# Patient Record
Sex: MALE | Race: WHITE | URBAN - METROPOLITAN AREA
[De-identification: names, ages, dates, MRNs, and addresses within clinical notes are randomized per-mention and may not be internally consistent; named-entity substitution may affect disease eponyms.]

---

## 2017-01-09 ENCOUNTER — OFFICE VISIT (OUTPATIENT)
Dept: CARDIOLOGY | Facility: CLINIC | Age: 62
End: 2017-01-09
Attending: NURSE PRACTITIONER
Payer: COMMERCIAL

## 2017-01-09 VITALS
HEART RATE: 70 BPM | DIASTOLIC BLOOD PRESSURE: 76 MMHG | BODY MASS INDEX: 29.25 KG/M2 | SYSTOLIC BLOOD PRESSURE: 100 MMHG | HEIGHT: 68 IN | WEIGHT: 193 LBS

## 2017-01-09 DIAGNOSIS — R06.02 SOB (SHORTNESS OF BREATH): ICD-10-CM

## 2017-01-09 DIAGNOSIS — Z95.2 S/P AVR: ICD-10-CM

## 2017-01-09 DIAGNOSIS — I10 ESSENTIAL HYPERTENSION: Primary | ICD-10-CM

## 2017-01-09 PROCEDURE — 99214 OFFICE O/P EST MOD 30 MIN: CPT | Performed by: NURSE PRACTITIONER

## 2017-01-09 RX ORDER — AMLODIPINE BESYLATE 5 MG/1
5 TABLET ORAL 2 TIMES DAILY
Qty: 90 TABLET | Refills: 3
Start: 2017-01-09 | End: 2017-02-06

## 2017-01-09 NOTE — Clinical Note
1/9/2017    Dipak Gordillo MD  [facility]   [address] [facility] [address]    RE: Cricket Miguel       Dear Colleague,    I had the pleasure of seeing Cricket Miguel in the [facility].    HPI and Plan:   See dictation    No orders of the defined types were placed in this encounter.       Orders Placed This Encounter   Medications     amLODIPine (NORVASC) 5 MG tablet     Sig: Take 1 tablet (5 mg) by mouth 2 times daily     Dispense:  90 tablet     Refill:  3       Medications Discontinued During This Encounter   Medication Reason     amLODIPine (NORVASC) 5 MG tablet Reorder         Encounter Diagnoses   Name Primary?     Hypertension goal BP (blood pressure) < 140/90      SOB (shortness of breath)      S/P AVR Yes     Essential hypertension        CURRENT MEDICATIONS:  Current Outpatient Prescriptions   Medication Sig Dispense Refill     amLODIPine (NORVASC) 5 MG tablet Take 1 tablet (5 mg) by mouth 2 times daily 90 tablet 3     carvedilol (COREG) 12.5 MG tablet Take 1 tablet (12.5 mg) by mouth 2 times daily (with meals) 180 tablet 3     hydrochlorothiazide (HYDRODIURIL) 25 MG tablet Take 1 tablet (25 mg) by mouth daily 90 tablet 3     aspirin  MG tablet Take 1 tablet (325 mg) by mouth daily 60 tablet 3     atorvastatin (LIPITOR) 40 MG tablet Take 1 tablet (40 mg) by mouth daily 90 tablet 12     [DISCONTINUED] amLODIPine (NORVASC) 5 MG tablet Take 2 tablets (10 mg) by mouth daily 90 tablet 3       ALLERGIES     Allergies   Allergen Reactions     Lisinopril Swelling     One-sided facial swelling after being on lisinopril/HCTZ for one week.        PAST MEDICAL HISTORY:  Past Medical History   Diagnosis Date     Hypertension goal BP (blood pressure) < 140/90      Major depressive disorder, single episode, moderate (H)      Hyperlipidemia LDL goal <70 10/31/2010     left lung nodule  1/29/2008     Tobacco use disorder      Psoriasis childhood     Inguinal  hernia      COPD, mild (H)      spirometry 12/16     CHF (congestive heart failure), NYHA class II (H)      AR (aortic regurgitation)      severe     AI (aortic insufficiency)      Aortic root dilatation (H)      Heart murmur      Cardiomyopathy (H)        PAST SURGICAL HISTORY:  Past Surgical History   Procedure Laterality Date     C shoulder arthroscopy, dx  2001     Arthroscopy, Shoulder RT Dr OSIRIS Andersen     C shoulder arthroscopy, dx  1/04     LT arthroscopy Dr OSIRIS Andersen     Hernia repair, umbilical  1/08     incarcerated - dr rockwell     Rotator cuff repair rt/lt  11/10     Rt arthroscopy - Dr OSIRIS Andersen     Herniorrhaphy inguinal  12/21/2012     HERNIORRHAPHY INGUINAL;  Right Inguinal Hernia Repair with mesh ;  Surgeon: Mello Rockwell MD;  Location: [facility] OR     Surgical history of -   5/16     AVR, severe AR, aortic root repair     Replace valve aortic N/A 5/17/2016     REPLACE VALVE AORTIC - Dr Bowers       FAMILY HISTORY:  Family History   Problem Relation Age of Onset     Genetic Disorder Mother      Bone plateover growth Agustin. shoulder surgeries     CANCER Mother      brain cancer     Alzheimer Disease Father        SOCIAL HISTORY:  Social History     Social History     Marital Status:      Spouse Name: N/A     Number of Children: 3     Years of Education: 12     Social History Main Topics     Smoking status: Former Smoker -- 1.00 packs/day for 35 years     Quit date: 04/01/2016     Smokeless tobacco: Never Used      Comment: 4/2016     Alcohol Use: 0.0 oz/week     0 Standard drinks or equivalent per week      Comment: 1 drink per week avg, seldom     Drug Use: Yes      Comment: marijuana- daily     Sexual Activity:     Partners: Female     Other Topics Concern     Caffeine Concern Yes     3 cups     Sleep Concern No     Special Diet No     trying to watch salt     Exercise Yes     walking     Seat Belt No     Parent/Sibling W/ Cabg, Mi Or Angioplasty Before 65f 55m? No     Social History Narrative  "      Review of Systems:  Skin:  Negative       Eyes:  Positive for glasses    ENT:  Positive for nasal congestion    Respiratory:  Negative       Cardiovascular:    Positive for;lightheadedness;dizziness feet  Gastroenterology: Negative      Genitourinary:  Negative      Musculoskeletal:  Negative      Neurologic:  Negative      Psychiatric:  Negative      Heme/Lymph/Imm:  Positive for allergies    Endocrine:  Negative        Physical Exam:  Vitals: /76 mmHg  Pulse 70  Ht 1.727 m (5' 8\")  Wt 87.544 kg (193 lb)  BMI 29.35 kg/m2    Constitutional:  cooperative, alert and oriented, well developed, well nourished, in no acute distress        Skin:  warm and dry to the touch        Head:  normocephalic        Eyes:  sclera white        ENT:  no pallor or cyanosis        Neck:  carotid pulses are full and equal bilaterally        Chest:  normal breath sounds, clear to auscultation, normal A-P diameter, normal symmetry, normal respiratory excursion, no use of accessory muscles  (bilateral basilar LS diminished)        Cardiac: regular rhythm;no murmurs, gallops or rubs detected                  Abdomen:  abdomen soft        Vascular: pulses full and equal                                        Extremities and Back:  no deformities, clubbing, cyanosis, erythema observed;no edema              Neurological:  affect appropriate, oriented to time, person and place;no gross motor deficits              CC  Jane Alcaraz, BIBI CNP   [facility]  [address]  [place] [address]                  Cardiology Clinic Progress Note  Cricket Miguel MRN# [id number]   YOB: 1955 Age: 61 year old     Reason For Visit:  BP recheck and SOB   Primary Cardiologist:   Dr. Garzon          History of Presenting Illness:    Cricket Miguel is a pleasant 61 year old patient with a past cardiac history significant for bioprosthetic AVR with Dr. Bowers 5/2016, CAD with 90-95% stenosis OM1 too " small to graft at the time of valve replacement, and hypertension.  Past medical history significant for history of tobacco abuse (35-40 year pack history) with cessation in June 2016 and current history of smoking marijuana.  He has a history of face swelling which may have been angioedema on lisinopril and possibly losartan.  Pt was last seen by me on 12/20/2016 for hypertension and SOB.  At that time he reported home blood pressure readings of 150/105 and shortness of breath occasionally occurring with exertion.  Amlodipine 5 mg daily was increased to 5mg b.i.d.    Pt presents today for BP recheck and SOB.  His blood pressure today in the office is 100/76.  He has not been taking his home blood pressures.  He denies any significant lightheadedness or dizziness since increasing his amlodipine.  He has been taking the increased amlodipine 5 mg b.i.d.  He does continue to have complaints of occasional shortness of breath occurring about twice a month.  This sometimes occurs with exertion while carrying a package and also occurs at rest.  He states that he is surprised his lung function has not improved after he quit smoking back in April.  I did discuss that with his 35-40 year pack history, he most likely has some permanent lung damage. Patient reports no chest pain, PND, orthopnea, presyncope, syncope, edema, heart racing, or palpitations.      Current Cardiac Medications   Carvedilol 12.5 mg b.i.d.  Hydrochlorothiazide 25 mg daily  Amlodipine 5 mg b.i.d.  Aspirin 325 mg daily  Atorvastatin 40 mg daily                    Assessment and Plan:     Plan  1.  Continue current medications   2.  Call the clinic if blood pressure is consistently lower than 100/50 associated with lightheadedness or dizziness.  Bring blood pressure recordings with to next office visit  3.  Call the clinic if increase in SOB or any chest discomfort. Could consider adding Imdur for OM1 stenosis, if BP is not too low.   4.   Follow-up with  Dr. Garzon 2/6 as scheduled         1. Hypertension    Controlled 100/76. Has not been checking home BPs    Continue amlodipine, carvedilol, and hydrochlorothiazide      2. CAD    Angiogram 5/2016 with 90-95% stenosis OM1, 40-50% mid LAD, and 30% proximal circumflex    OM1 was too small to graft during AVR    Possible angina with SOB, see below    Continue statin, aspirin, beta blocker, amlodipine      3. SOB    Occasional episodes about twice a month, occurs with exertion and at rest. This has been stable.     Could consider angina d/t small OM1 with 90-95% stenosis or progressing lung disease given 35-40 year pack history    Pt does not currently want to add Imdur as he is trying to decrease the amount of medication he takes      4. History of bioprosthetic aortic valve replacement    5/2016       Thank you for allowing me to participate in this delightful patient's care.      This note was completed in part using Dragon voice recognition software. Although reviewed after completion, some word and grammatical errors may occur.    BIBI Keys, CNP           Data:   All laboratory data reviewed:    LAST CHOLESTEROL:  CHOL      105   4/3/2016  HDL       25   4/3/2016  LDL       56   4/3/2016  TRIG      120   4/3/2016  CHOLHDLRATIO      4.2   2/7/2012    LAST BMP:  NA      141   6/3/2016   POTASSIUM      4.9   6/3/2016  CHLORIDE      106   6/3/2016  IZABELLA      9.7   6/3/2016  CO2       30   6/3/2016  BUN       20   6/3/2016  CR     1.03   6/3/2016  GLC       87   6/3/2016    LAST CBC:  WBC     11.2   6/3/2016  RBC     3.79   6/3/2016  HGB     11.0   6/3/2016  HCT     37.1   6/3/2016  MCV       98   6/3/2016  MCH     29.0   6/3/2016  MCHC     29.6   6/3/2016  RDW     16.1   6/3/2016  PLT      353   6/3/2016    Thank you for allowing me to participate in the care of your patient.    Sincerely,     BIBI Keys CNP     [facility]

## 2017-01-09 NOTE — MR AVS SNAPSHOT
After Visit Summary   1/9/2017    Cricket Miguel    MRN: [id number]           Patient Information     Date Of Birth          1955        Visit Information        Provider Department      1/9/2017 12:30 PM Jane Alcaraz APRN CNP [facility] [place]        Today's Diagnoses     S/P AVR    -  1     Hypertension goal BP (blood pressure) < 140/90         SOB (shortness of breath)         Essential hypertension           Care Instructions    Thanks for coming into [facility] today.    We discussed:   Check home Blood pressure and bring readings to next visit with Dr. Garzon  Call the clinic if BP< 100 or lightheadedness or diziness gets worse    Medication changes:    No changes today     Follow up:   Dr. Garzon 2/6    Please call my nurse Amada at [phone] with any questions or concerns.    Scheduling phone number: [phone]  Reminder: Please bring in all current medications, over the counter supplements and vitamin bottles to your next appointment.                Follow-ups after your visit        Your next 10 appointments already scheduled     Feb 06, 2017  4:15 PM   Return Visit with Alfredito Garzon MD   [facility] ([facility])    [address] [address] [address] [phone]              Who to contact     If you have questions or need follow up information about today's clinic visit or your schedule please contact [facility] directly at [phone].  Normal or non-critical lab and imaging results will be communicated to you by MyChart, letter or phone within 4 business days after the clinic has received the results. If you do not hear from us within 7 days, please contact the clinic through MyChart or phone. If you have a critical or abnormal lab result, we will notify you by phone as soon as  "possible.  Submit refill requests through [employer] or call your pharmacy and they will forward the refill request to us. Please allow 3 business days for your refill to be completed.          Additional Information About Your Visit        [employer] Information     [employer] gives you secure access to your electronic health record. If you see a primary care provider, you can also send messages to your care team and make appointments. If you have questions, please call your primary care clinic.  If you do not have a primary care provider, please call [phone] and they will assist you.        Care EveryWhere ID     This is your Care EveryWhere ID. This could be used by other organizations to access your [facility] medical records  [id number]        Your Vitals Were     Pulse Height BMI (Body Mass Index)             70 1.727 m (5' 8\") 29.35 kg/m2          Blood Pressure from Last 3 Encounters:   01/09/17 100/76   12/20/16 124/80   11/30/16 118/74    Weight from Last 3 Encounters:   01/09/17 87.544 kg (193 lb)   12/20/16 87.091 kg (192 lb)   11/30/16 89.359 kg (197 lb)              We Performed the Following     Follow-Up with Cardiac Advanced Practice Provider          Today's Medication Changes          These changes are accurate as of: 1/9/17  1:23 PM.  If you have any questions, ask your nurse or doctor.               These medicines have changed or have updated prescriptions.        Dose/Directions    amLODIPine 5 MG tablet   Commonly known as:  NORVASC   This may have changed:    - how much to take  - when to take this   Used for:  S/P AVR, Essential hypertension   Changed by:  Jane Alcaraz APRN CNP        Dose:  5 mg   Take 1 tablet (5 mg) by mouth 2 times daily   Quantity:  90 tablet   Refills:  3            Where to get your medicines      Some of these will need a paper prescription and others can be bought over the counter.  Ask your nurse if you have questions.     You don't need a prescription " for these medications    - amLODIPine 5 MG tablet             Primary Care Provider Office Phone # Fax #    Dipak Gordillo [phone]       [address] [address]        Thank you!     Thank you for choosing [facility] [place]  for your care. Our goal is always to provide you with excellent care. Hearing back from our patients is one way we can continue to improve our services. Please take a few minutes to complete the written survey that you may receive in the mail after your visit with us. Thank you!             Your Updated Medication List - Protect others around you: Learn how to safely use, store and throw away your medicines at www.disposemymeds.org.          This list is accurate as of: 1/9/17  1:23 PM.  Always use your most recent med list.                   Brand Name Dispense Instructions for use    amLODIPine 5 MG tablet    NORVASC    90 tablet    Take 1 tablet (5 mg) by mouth 2 times daily       aspirin  MG EC tablet     60 tablet    Take 1 tablet (325 mg) by mouth daily       atorvastatin 40 MG tablet    LIPITOR    90 tablet    Take 1 tablet (40 mg) by mouth daily       carvedilol 12.5 MG tablet    COREG    180 tablet    Take 1 tablet (12.5 mg) by mouth 2 times daily (with meals)       hydrochlorothiazide 25 MG tablet    HYDRODIURIL    90 tablet    Take 1 tablet (25 mg) by mouth daily

## 2017-01-09 NOTE — PROGRESS NOTES
Cardiology Clinic Progress Note  Cricket Miguel MRN# [id number]   YOB: 1955 Age: 61 year old     Reason For Visit:  BP recheck and SOB   Primary Cardiologist:   Dr. Garzon          History of Presenting Illness:    Cricket Miguel is a pleasant 61 year old patient with a past cardiac history significant for bioprosthetic AVR with Dr. Bowers 5/2016, CAD with 90-95% stenosis OM1 too small to graft at the time of valve replacement, and hypertension.  Past medical history significant for history of tobacco abuse (35-40 year pack history) with cessation in June 2016 and current history of smoking marijuana.  He has a history of face swelling which may have been angioedema on lisinopril and possibly losartan.  Pt was last seen by me on 12/20/2016 for hypertension and SOB.  At that time he reported home blood pressure readings of 150/105 and shortness of breath occasionally occurring with exertion.  Amlodipine 5 mg daily was increased to 5mg b.i.d.    Pt presents today for BP recheck and SOB.  His blood pressure today in the office is 100/76.  He has not been taking his home blood pressures.  He denies any significant lightheadedness or dizziness since increasing his amlodipine.  He has been taking the increased amlodipine 5 mg b.i.d.  He does continue to have complaints of occasional shortness of breath occurring about twice a month.  This sometimes occurs with exertion while carrying a package and also occurs at rest.  He states that he is surprised his lung function has not improved after he quit smoking back in April.  I did discuss that with his 35-40 year pack history, he most likely has some permanent lung damage. Patient reports no chest pain, PND, orthopnea, presyncope, syncope, edema, heart racing, or palpitations.      Current Cardiac Medications   Carvedilol 12.5 mg b.i.d.  Hydrochlorothiazide 25 mg daily  Amlodipine 5 mg b.i.d.  Aspirin 325 mg daily  Atorvastatin 40 mg daily                     Assessment and Plan:     Plan  1.  Continue current medications   2.  Call the clinic if blood pressure is consistently lower than 100/50 associated with lightheadedness or dizziness.  Bring blood pressure recordings with to next office visit  3.  Call the clinic if increase in SOB or any chest discomfort. Could consider adding Imdur for OM1 stenosis, if BP is not too low.   4.   Follow-up with Dr. Garzon 2/6 as scheduled         1. Hypertension    Controlled 100/76. Has not been checking home BPs    Continue amlodipine, carvedilol, and hydrochlorothiazide      2. CAD    Angiogram 5/2016 with 90-95% stenosis OM1, 40-50% mid LAD, and 30% proximal circumflex    OM1 was too small to graft during AVR    Possible angina with SOB, see below    Continue statin, aspirin, beta blocker, amlodipine      3. SOB    Occasional episodes about twice a month, occurs with exertion and at rest. This has been stable.     Could consider angina d/t small OM1 with 90-95% stenosis or progressing lung disease given 35-40 year pack history    Pt does not currently want to add Imdur as he is trying to decrease the amount of medication he takes      4. History of bioprosthetic aortic valve replacement    5/2016       Thank you for allowing me to participate in this delightful patient's care.      This note was completed in part using Dragon voice recognition software. Although reviewed after completion, some word and grammatical errors may occur.    Jane Alcaraz, APRN, CNP           Data:   All laboratory data reviewed:    LAST CHOLESTEROL:  CHOL      105   4/3/2016  HDL       25   4/3/2016  LDL       56   4/3/2016  TRIG      120   4/3/2016  CHOLHDLRATIO      4.2   2/7/2012    LAST BMP:  NA      141   6/3/2016   POTASSIUM      4.9   6/3/2016  CHLORIDE      106   6/3/2016  IZABELLA      9.7   6/3/2016  CO2       30   6/3/2016  BUN       20   6/3/2016  CR     1.03   6/3/2016  GLC       87   6/3/2016    LAST CBC:  WBC     11.2   6/3/2016  RBC      3.79   6/3/2016  HGB     11.0   6/3/2016  HCT     37.1   6/3/2016  MCV       98   6/3/2016  MCH     29.0   6/3/2016  MCHC     29.6   6/3/2016  RDW     16.1   6/3/2016  PLT      353   6/3/2016

## 2017-01-09 NOTE — PROGRESS NOTES
HPI and Plan:   See dictation    No orders of the defined types were placed in this encounter.       Orders Placed This Encounter   Medications     amLODIPine (NORVASC) 5 MG tablet     Sig: Take 1 tablet (5 mg) by mouth 2 times daily     Dispense:  90 tablet     Refill:  3       Medications Discontinued During This Encounter   Medication Reason     amLODIPine (NORVASC) 5 MG tablet Reorder         Encounter Diagnoses   Name Primary?     Hypertension goal BP (blood pressure) < 140/90      SOB (shortness of breath)      S/P AVR Yes     Essential hypertension        CURRENT MEDICATIONS:  Current Outpatient Prescriptions   Medication Sig Dispense Refill     amLODIPine (NORVASC) 5 MG tablet Take 1 tablet (5 mg) by mouth 2 times daily 90 tablet 3     carvedilol (COREG) 12.5 MG tablet Take 1 tablet (12.5 mg) by mouth 2 times daily (with meals) 180 tablet 3     hydrochlorothiazide (HYDRODIURIL) 25 MG tablet Take 1 tablet (25 mg) by mouth daily 90 tablet 3     aspirin  MG tablet Take 1 tablet (325 mg) by mouth daily 60 tablet 3     atorvastatin (LIPITOR) 40 MG tablet Take 1 tablet (40 mg) by mouth daily 90 tablet 12     [DISCONTINUED] amLODIPine (NORVASC) 5 MG tablet Take 2 tablets (10 mg) by mouth daily 90 tablet 3       ALLERGIES     Allergies   Allergen Reactions     Lisinopril Swelling     One-sided facial swelling after being on lisinopril/HCTZ for one week.        PAST MEDICAL HISTORY:  Past Medical History   Diagnosis Date     Hypertension goal BP (blood pressure) < 140/90      Major depressive disorder, single episode, moderate (H)      Hyperlipidemia LDL goal <70 10/31/2010     left lung nodule  1/29/2008     Tobacco use disorder      Psoriasis childhood     Inguinal hernia      COPD, mild (H)      spirometry 12/16     CHF (congestive heart failure), NYHA class II (H)      AR (aortic regurgitation)      severe     AI (aortic insufficiency)      Aortic root dilatation (H)      Heart murmur      Cardiomyopathy  (H)        PAST SURGICAL HISTORY:  Past Surgical History   Procedure Laterality Date     C shoulder arthroscopy, dx  2001     Arthroscopy, Shoulder RT Dr OSIRIS Andersen     C shoulder arthroscopy, dx  1/04     LT arthroscopy Dr OSIRIS Andersen     Hernia repair, umbilical  1/08     incarcerated - dr rockwell     Rotator cuff repair rt/lt  11/10     Rt arthroscopy - Dr OSIRIS Andersen     Herniorrhaphy inguinal  12/21/2012     HERNIORRHAPHY INGUINAL;  Right Inguinal Hernia Repair with mesh ;  Surgeon: Mello Rockwell MD;  Location: [facility] OR     Surgical history of -   5/16     AVR, severe AR, aortic root repair     Replace valve aortic N/A 5/17/2016     REPLACE VALVE AORTIC - Dr Bowers       FAMILY HISTORY:  Family History   Problem Relation Age of Onset     Genetic Disorder Mother      Bone plateover growth Agustin. shoulder surgeries     CANCER Mother      brain cancer     Alzheimer Disease Father        SOCIAL HISTORY:  Social History     Social History     Marital Status:      Spouse Name: N/A     Number of Children: 3     Years of Education: 12     Social History Main Topics     Smoking status: Former Smoker -- 1.00 packs/day for 35 years     Quit date: 04/01/2016     Smokeless tobacco: Never Used      Comment: 4/2016     Alcohol Use: 0.0 oz/week     0 Standard drinks or equivalent per week      Comment: 1 drink per week avg, seldom     Drug Use: Yes      Comment: marijuana- daily     Sexual Activity:     Partners: Female     Other Topics Concern     Caffeine Concern Yes     3 cups     Sleep Concern No     Special Diet No     trying to watch salt     Exercise Yes     walking     Seat Belt No     Parent/Sibling W/ Cabg, Mi Or Angioplasty Before 65f 55m? No     Social History Narrative       Review of Systems:  Skin:  Negative       Eyes:  Positive for glasses    ENT:  Positive for nasal congestion    Respiratory:  Negative       Cardiovascular:    Positive for;lightheadedness;dizziness feet  Gastroenterology: Negative     "  Genitourinary:  Negative      Musculoskeletal:  Negative      Neurologic:  Negative      Psychiatric:  Negative      Heme/Lymph/Imm:  Positive for allergies    Endocrine:  Negative        Physical Exam:  Vitals: /76 mmHg  Pulse 70  Ht 1.727 m (5' 8\")  Wt 87.544 kg (193 lb)  BMI 29.35 kg/m2    Constitutional:  cooperative, alert and oriented, well developed, well nourished, in no acute distress        Skin:  warm and dry to the touch        Head:  normocephalic        Eyes:  sclera white        ENT:  no pallor or cyanosis        Neck:  carotid pulses are full and equal bilaterally        Chest:  normal breath sounds, clear to auscultation, normal A-P diameter, normal symmetry, normal respiratory excursion, no use of accessory muscles  (bilateral basilar LS diminished)        Cardiac: regular rhythm;no murmurs, gallops or rubs detected                  Abdomen:  abdomen soft        Vascular: pulses full and equal                                        Extremities and Back:  no deformities, clubbing, cyanosis, erythema observed;no edema              Neurological:  affect appropriate, oriented to time, person and place;no gross motor deficits              BIBI Bolanos CNP   [facility]  [address]  [place] [id number]                "

## 2017-01-09 NOTE — PATIENT INSTRUCTIONS
Thanks for coming into [facility] clinic today.    We discussed:   Check home Blood pressure and bring readings to next visit with Dr. Garzon  Call the clinic if BP< 100 or lightheadedness or diziness gets worse    Medication changes:    No changes today     Follow up:   Dr. Garzon 2/6    Please call my nurse Amada at [phone] with any questions or concerns.    Scheduling phone number: [phone]  Reminder: Please bring in all current medications, over the counter supplements and vitamin bottles to your next appointment.

## 2017-02-03 ENCOUNTER — PRE VISIT (OUTPATIENT)
Dept: CARDIOLOGY | Facility: CLINIC | Age: 62
End: 2017-02-03

## 2017-02-06 ENCOUNTER — OFFICE VISIT (OUTPATIENT)
Dept: CARDIOLOGY | Facility: CLINIC | Age: 62
End: 2017-02-06
Attending: NURSE PRACTITIONER
Payer: COMMERCIAL

## 2017-02-06 VITALS
HEART RATE: 86 BPM | HEIGHT: 68 IN | DIASTOLIC BLOOD PRESSURE: 84 MMHG | SYSTOLIC BLOOD PRESSURE: 132 MMHG | WEIGHT: 202 LBS | BODY MASS INDEX: 30.62 KG/M2

## 2017-02-06 DIAGNOSIS — I10 ESSENTIAL HYPERTENSION: ICD-10-CM

## 2017-02-06 DIAGNOSIS — R06.02 SOB (SHORTNESS OF BREATH): ICD-10-CM

## 2017-02-06 DIAGNOSIS — Z95.2 S/P AVR: Primary | ICD-10-CM

## 2017-02-06 DIAGNOSIS — I10 HYPERTENSION GOAL BP (BLOOD PRESSURE) < 140/90: ICD-10-CM

## 2017-02-06 PROCEDURE — 99215 OFFICE O/P EST HI 40 MIN: CPT | Performed by: INTERNAL MEDICINE

## 2017-02-06 RX ORDER — FUROSEMIDE 20 MG
20 TABLET ORAL DAILY PRN
Qty: 90 TABLET | Refills: 3 | Status: ON HOLD | OUTPATIENT
Start: 2017-02-06 | End: 2017-11-03

## 2017-02-06 RX ORDER — AMLODIPINE BESYLATE 5 MG/1
5 TABLET ORAL 2 TIMES DAILY
Qty: 180 TABLET | Refills: 3 | Status: SHIPPED | OUTPATIENT
Start: 2017-02-06 | End: 2017-12-06

## 2017-02-06 NOTE — PROGRESS NOTES
"Cardiology Progress Note          Assessment and Plan:     1. Status post aortic valve replacement    Bioprosthetic aortic valve functioning normally.  Continue to follow.      2. Coronary artery disease with small OM, medically managed    Continue medical therapy.      3. Fatigue and dyspnea    Likely multifactorial.  Trial of decreasing his carvedilol down to 6.25 mg twice per day and adding furosemide when blood pressures are high.    May need to repeat stress testing in the future.    Labs including TSH, basic metabolic panel and CBC to be scheduled in the near future.  Follow up in clinic with me two months.    This note was transcribed using electronic voice recognition software and there may be typographical errors present.                Interval History:   The patient is a pleasant and talkative 61 year old whom I have been following for aortic regurgitation now status post aortic valve replacement.  He has gained approximately 20 pounds after he quit smoking.  He feels somewhat short of breath.  He has bilateral hand tingling that is intermittent and worse with the cold.  This has improved with change in reduction of his beta blocker.  He has COPD from his previous smoking.  He has stable dyspnea on exertion.                       Review of Systems:   Review of Systems:  Skin:  Positive for     Eyes:  Positive for glasses  ENT:  Negative    Respiratory:  Positive for shortness of breath;dyspnea on exertion;wheezing  Cardiovascular:    lightheadedness;dizziness;Positive for;chest pain;edema  Gastroenterology: Negative    Genitourinary:  Negative    Musculoskeletal:  Negative    Neurologic:  Positive for numbness or tingling of hands  Psychiatric:  Negative    Heme/Lymph/Imm:  Negative    Endocrine:  Negative                Physical Exam:     Vitals: /84 mmHg  Pulse 86  Ht 1.727 m (5' 8\")  Wt 91.627 kg (202 lb)  BMI 30.72 kg/m2  Constitutional:  cooperative, alert and oriented, well developed, well " nourished, in no acute distress        Skin:  warm and dry to the touch        Head:  normocephalic        Eyes:  sclera white        ENT:  no pallor or cyanosis        Neck:  JVP normal        Chest:     (bilateral basilar LS diminished) trace bibasilar crackles    Cardiac: regular rhythm       grade 1;RUSB          Abdomen:      benign    Extremities and Back:  no deformities, clubbing, cyanosis, erythema observed        Neurological:  affect appropriate, oriented to time, person and place;no gross motor deficits                 Medications:     Current Outpatient Prescriptions   Medication Sig Dispense Refill     amLODIPine (NORVASC) 5 MG tablet Take 1 tablet (5 mg) by mouth 2 times daily 180 tablet 3     furosemide (LASIX) 20 MG tablet Take 1 tablet (20 mg) by mouth daily as needed If blood pressure above 130 90 tablet 3     carvedilol (COREG) 12.5 MG tablet Take 1 tablet (12.5 mg) by mouth 2 times daily (with meals) 180 tablet 3     hydrochlorothiazide (HYDRODIURIL) 25 MG tablet Take 1 tablet (25 mg) by mouth daily 90 tablet 3     aspirin  MG tablet Take 1 tablet (325 mg) by mouth daily 60 tablet 3     atorvastatin (LIPITOR) 40 MG tablet Take 1 tablet (40 mg) by mouth daily 90 tablet 12     [DISCONTINUED] amLODIPine (NORVASC) 5 MG tablet Take 1 tablet (5 mg) by mouth 2 times daily 90 tablet 3                Data:   All laboratory data reviewed  No results found for this or any previous visit (from the past 24 hour(s)).    All laboratory data reviewed  CHOL      105   4/3/2016  HDL       25   4/3/2016  LDL       56   4/3/2016  TRIG      120   4/3/2016  CHOLHDLRATIO      4.2   2/7/2012  TSH   Date Value Ref Range Status   04/03/2016 0.945 mcU/mL Final     Last Basic Metabolic Panel:  NA      141   6/3/2016   POTASSIUM      4.9   6/3/2016  CHLORIDE      106   6/3/2016  IZABELLA      9.7   6/3/2016  CO2       30   6/3/2016  BUN       20   6/3/2016  CR     1.03   6/3/2016  GLC       87   6/3/2016  WBC     11.2    6/3/2016  RBC     3.79   6/3/2016  HGB     11.0   6/3/2016  HCT     37.1   6/3/2016  MCV       98   6/3/2016  MCH     29.0   6/3/2016  MCHC     29.6   6/3/2016  RDW     16.1   6/3/2016  PLT      353   6/3/2016

## 2017-02-06 NOTE — PATIENT INSTRUCTIONS
Let's try cutting the carvedilol dose down. Currently you are taking 12.5mg twice per day. I would like you to cut that to 6.25mg twice per day. It's short acting so you can't just take 12.5 once. It has to be twice per day. That might help with the finger tingling and some of the decreased energy.    Would like you to really keep track of the weight and portions. Try to do some walking and regular exercise if you can.     Please stay on your AMLODIPINE at the 5mg twice per day.  Stay on your HYDROCHLOROTHIAZIDE as well.    We will add FUROSEMIDE (LASIX) 20mg once per day IF your blood pressure is above 130. It lasts for about six hours so you can time it how your schedule is.    We will see you back in around 2 months. Check your labs today. If you notice more shortness of breath or don't feel well, let us know earlier rather than later.

## 2017-02-06 NOTE — Clinical Note
2/6/2017    Dipak Gordillo MD  [facility]   [address] [address]      RE: Cricket Miguel       Dear Colleague,    I had the pleasure of seeing Cricket Miguel in the [facility].  Cardiology Progress Note          Assessment and Plan:     1. Status post aortic valve replacement    Bioprosthetic aortic valve functioning normally.  Continue to follow.      2. Coronary artery disease with small OM, medically managed    Continue medical therapy.      3. Fatigue and dyspnea    Likely multifactorial.  Trial of decreasing his carvedilol down to 6.25 mg twice per day and adding furosemide when blood pressures are high.    May need to repeat stress testing in the future.    Labs including TSH, basic metabolic panel and CBC to be scheduled in the near future.  Follow up in clinic with me two months.    This note was transcribed using electronic voice recognition software and there may be typographical errors present.                Interval History:   The patient is a pleasant and talkative 61 year old whom I have been following for aortic regurgitation now status post aortic valve replacement.  He has gained approximately 20 pounds after he quit smoking.  He feels somewhat short of breath.  He has bilateral hand tingling that is intermittent and worse with the cold.  This has improved with change in reduction of his beta blocker.  He has COPD from his previous smoking.  He has stable dyspnea on exertion.                       Review of Systems:   Review of Systems:  Skin:  Positive for     Eyes:  Positive for glasses  ENT:  Negative    Respiratory:  Positive for shortness of breath;dyspnea on exertion;wheezing  Cardiovascular:    lightheadedness;dizziness;Positive for;chest pain;edema  Gastroenterology: Negative    Genitourinary:  Negative    Musculoskeletal:  Negative    Neurologic:  Positive for numbness or tingling of hands  Psychiatric:  Negative   "  Heme/Lymph/Imm:  Negative    Endocrine:  Negative                Physical Exam:     Vitals: /84 mmHg  Pulse 86  Ht 1.727 m (5' 8\")  Wt 91.627 kg (202 lb)  BMI 30.72 kg/m2  Constitutional:  cooperative, alert and oriented, well developed, well nourished, in no acute distress        Skin:  warm and dry to the touch        Head:  normocephalic        Eyes:  sclera white        ENT:  no pallor or cyanosis        Neck:  JVP normal        Chest:     (bilateral basilar LS diminished) trace bibasilar crackles    Cardiac: regular rhythm       grade 1;RUSB          Abdomen:      benign    Extremities and Back:  no deformities, clubbing, cyanosis, erythema observed        Neurological:  affect appropriate, oriented to time, person and place;no gross motor deficits                 Medications:     Current Outpatient Prescriptions   Medication Sig Dispense Refill     amLODIPine (NORVASC) 5 MG tablet Take 1 tablet (5 mg) by mouth 2 times daily 180 tablet 3     furosemide (LASIX) 20 MG tablet Take 1 tablet (20 mg) by mouth daily as needed If blood pressure above 130 90 tablet 3     carvedilol (COREG) 12.5 MG tablet Take 1 tablet (12.5 mg) by mouth 2 times daily (with meals) 180 tablet 3     hydrochlorothiazide (HYDRODIURIL) 25 MG tablet Take 1 tablet (25 mg) by mouth daily 90 tablet 3     aspirin  MG tablet Take 1 tablet (325 mg) by mouth daily 60 tablet 3     atorvastatin (LIPITOR) 40 MG tablet Take 1 tablet (40 mg) by mouth daily 90 tablet 12     [DISCONTINUED] amLODIPine (NORVASC) 5 MG tablet Take 1 tablet (5 mg) by mouth 2 times daily 90 tablet 3                Data:   All laboratory data reviewed  No results found for this or any previous visit (from the past 24 hour(s)).    All laboratory data reviewed  CHOL      105   4/3/2016  HDL       25   4/3/2016  LDL       56   4/3/2016  TRIG      120   4/3/2016  CHOLHDLRATIO      4.2   2/7/2012  TSH   Date Value Ref Range Status   04/03/2016 0.945 mcU/mL Final "     Last Basic Metabolic Panel:  NA      141   6/3/2016   POTASSIUM      4.9   6/3/2016  CHLORIDE      106   6/3/2016  IZABELLA      9.7   6/3/2016  CO2       30   6/3/2016  BUN       20   6/3/2016  CR     1.03   6/3/2016  GLC       87   6/3/2016  WBC     11.2   6/3/2016  RBC     3.79   6/3/2016  HGB     11.0   6/3/2016  HCT     37.1   6/3/2016  MCV       98   6/3/2016  MCH     29.0   6/3/2016  MCHC     29.6   6/3/2016  RDW     16.1   6/3/2016  PLT      353   6/3/2016    Thank you for allowing me to participate in the care of your patient.    Sincerely,     Alfredito Garzon MD     [facility]

## 2017-02-06 NOTE — MR AVS SNAPSHOT
After Visit Summary   2/6/2017    Cricket Miguel    MRN: [id number]           Patient Information     Date Of Birth          1955        Visit Information        Provider Department      2/6/2017 4:15 PM Alfredito Garzon MD [facility] [place]        Today's Diagnoses     S/P AVR    -  1     Hypertension goal BP (blood pressure) < 140/90         SOB (shortness of breath)         Essential hypertension           Care Instructions    Let's try cutting the carvedilol dose down. Currently you are taking 12.5mg twice per day. I would like you to cut that to 6.25mg twice per day. It's short acting so you can't just take 12.5 once. It has to be twice per day. That might help with the finger tingling and some of the decreased energy.    Would like you to really keep track of the weight and portions. Try to do some walking and regular exercise if you can.     Please stay on your AMLODIPINE at the 5mg twice per day.  Stay on your HYDROCHLOROTHIAZIDE as well.    We will add FUROSEMIDE (LASIX) 20mg once per day IF your blood pressure is above 130. It lasts for about six hours so you can time it how your schedule is.    We will see you back in around 2 months. Check your labs today. If you notice more shortness of breath or don't feel well, let us know earlier rather than later.            Follow-ups after your visit        Additional Services     Follow-Up with Cardiologist                 Future tests that were ordered for you today     Open Future Orders        Priority Expected Expires Ordered    Follow-Up with Cardiologist Routine 4/5/2017 2/6/2018 2/6/2017    Comprehensive metabolic panel Routine 2/6/2017 2/6/2018 2/6/2017    CBC with platelets differential Routine 2/6/2017 2/1/2018 2/6/2017    TSH Routine 2/6/2017 2/1/2018 2/6/2017            Who to contact     If you have questions or need follow up information about today's clinic visit or your schedule please  "contact [facility] [place] directly at [phone].  Normal or non-critical lab and imaging results will be communicated to you by MyChart, letter or phone within 4 business days after the clinic has received the results. If you do not hear from us within 7 days, please contact the clinic through [employer] or phone. If you have a critical or abnormal lab result, we will notify you by phone as soon as possible.  Submit refill requests through [employer] or call your pharmacy and they will forward the refill request to us. Please allow 3 business days for your refill to be completed.          Additional Information About Your Visit        [facility] Information     [employer] gives you secure access to your electronic health record. If you see a primary care provider, you can also send messages to your care team and make appointments. If you have questions, please call your primary care clinic.  If you do not have a primary care provider, please call [phone] and they will assist you.        Care EveryWhere ID     This is your Care EveryWhere ID. This could be used by other organizations to access your [place] medical records  [id number]        Your Vitals Were     Pulse Height BMI (Body Mass Index)             86 1.727 m (5' 8\") 30.72 kg/m2          Blood Pressure from Last 3 Encounters:   02/06/17 132/84   01/09/17 100/76   12/20/16 124/80    Weight from Last 3 Encounters:   02/06/17 91.627 kg (202 lb)   01/09/17 87.544 kg (193 lb)   12/20/16 87.091 kg (192 lb)              We Performed the Following     Follow-Up with Cardiologist          Today's Medication Changes          These changes are accurate as of: 2/6/17  4:41 PM.  If you have any questions, ask your nurse or doctor.               Start taking these medicines.        Dose/Directions    furosemide 20 MG tablet   Commonly known as:  LASIX   Used for:  Hypertension goal BP (blood pressure) < 140/90, SOB (shortness of breath), " S/P AVR, Essential hypertension   Started by:  Alfredito Garzon MD        Dose:  20 mg   Take 1 tablet (20 mg) by mouth daily as needed If blood pressure above 130   Quantity:  90 tablet   Refills:  3            Where to get your medicines      These medications were sent to [id number] - [place] - [address] AT [facility]  [address], [place] [address]     Phone:  [phone]    - amLODIPine 5 MG tablet  - furosemide 20 MG tablet             Primary Care Provider Office Phone # Fax #    Dipak Gordillo [phone]       [facility] [address] [place] [address]        Thank you!     Thank you for choosing [facility] [place]  for your care. Our goal is always to provide you with excellent care. Hearing back from our patients is one way we can continue to improve our services. Please take a few minutes to complete the written survey that you may receive in the mail after your visit with us. Thank you!             Your Updated Medication List - Protect others around you: Learn how to safely use, store and throw away your medicines at www.disposemymeds.org.          This list is accurate as of: 2/6/17  4:41 PM.  Always use your most recent med list.                   Brand Name Dispense Instructions for use    amLODIPine 5 MG tablet    NORVASC    180 tablet    Take 1 tablet (5 mg) by mouth 2 times daily       aspirin  MG EC tablet     60 tablet    Take 1 tablet (325 mg) by mouth daily       atorvastatin 40 MG tablet    LIPITOR    90 tablet    Take 1 tablet (40 mg) by mouth daily       carvedilol 12.5 MG tablet    COREG    180 tablet    Take 1 tablet (12.5 mg) by mouth 2 times daily (with meals)       furosemide 20 MG tablet    LASIX    90 tablet    Take 1 tablet (20 mg) by mouth daily as needed If blood pressure above 130       hydrochlorothiazide 25 MG tablet    HYDRODIURIL    90 tablet    Take 1  tablet (25 mg) by mouth daily

## 2017-05-15 DIAGNOSIS — I99.8 VASCULAR CALCIFICATION: ICD-10-CM

## 2017-05-15 RX ORDER — ATORVASTATIN CALCIUM 40 MG/1
40 TABLET, FILM COATED ORAL DAILY
Qty: 90 TABLET | Refills: 1 | Status: SHIPPED | OUTPATIENT
Start: 2017-05-15 | End: 2017-12-06

## 2017-07-13 DIAGNOSIS — I10 HTN (HYPERTENSION): ICD-10-CM

## 2017-07-13 RX ORDER — HYDROCHLOROTHIAZIDE 25 MG/1
25 TABLET ORAL DAILY
Qty: 90 TABLET | Refills: 1 | Status: SHIPPED | OUTPATIENT
Start: 2017-07-13 | End: 2017-12-06

## 2017-08-15 ENCOUNTER — TRANSFERRED RECORDS (OUTPATIENT)
Dept: HEALTH INFORMATION MANAGEMENT | Facility: CLINIC | Age: 62
End: 2017-08-15

## 2017-10-06 ENCOUNTER — APPOINTMENT (OUTPATIENT)
Dept: MRI IMAGING | Facility: CLINIC | Age: 62
DRG: 004 | End: 2017-10-06
Attending: PSYCHIATRY & NEUROLOGY
Payer: COMMERCIAL

## 2017-10-06 ENCOUNTER — APPOINTMENT (OUTPATIENT)
Dept: GENERAL RADIOLOGY | Facility: CLINIC | Age: 62
End: 2017-10-06
Attending: EMERGENCY MEDICINE
Payer: COMMERCIAL

## 2017-10-06 ENCOUNTER — APPOINTMENT (OUTPATIENT)
Dept: CT IMAGING | Facility: CLINIC | Age: 62
End: 2017-10-06
Attending: EMERGENCY MEDICINE
Payer: COMMERCIAL

## 2017-10-06 ENCOUNTER — HOSPITAL ENCOUNTER (EMERGENCY)
Facility: CLINIC | Age: 62
Discharge: ANOTHER HOSPITAL | End: 2017-10-06
Attending: EMERGENCY MEDICINE | Admitting: EMERGENCY MEDICINE
Payer: COMMERCIAL

## 2017-10-06 ENCOUNTER — APPOINTMENT (OUTPATIENT)
Dept: GENERAL RADIOLOGY | Facility: CLINIC | Age: 62
DRG: 004 | End: 2017-10-06
Attending: PSYCHIATRY & NEUROLOGY
Payer: COMMERCIAL

## 2017-10-06 ENCOUNTER — HOSPITAL ENCOUNTER (INPATIENT)
Facility: CLINIC | Age: 62
LOS: 29 days | Discharge: NURSING, REHAB OR LONG-TERM CARE | DRG: 004 | End: 2017-11-04
Attending: EMERGENCY MEDICINE | Admitting: THORACIC SURGERY (CARDIOTHORACIC VASCULAR SURGERY)
Payer: COMMERCIAL

## 2017-10-06 ENCOUNTER — APPOINTMENT (OUTPATIENT)
Dept: CARDIOLOGY | Facility: CLINIC | Age: 62
DRG: 004 | End: 2017-10-06
Attending: EMERGENCY MEDICINE
Payer: COMMERCIAL

## 2017-10-06 ENCOUNTER — APPOINTMENT (OUTPATIENT)
Dept: GENERAL RADIOLOGY | Facility: CLINIC | Age: 62
DRG: 004 | End: 2017-10-06
Attending: EMERGENCY MEDICINE
Payer: COMMERCIAL

## 2017-10-06 VITALS
RESPIRATION RATE: 18 BRPM | HEIGHT: 68 IN | TEMPERATURE: 100.6 F | SYSTOLIC BLOOD PRESSURE: 125 MMHG | OXYGEN SATURATION: 94 % | DIASTOLIC BLOOD PRESSURE: 86 MMHG

## 2017-10-06 DIAGNOSIS — K59.03 DRUG-INDUCED CONSTIPATION: ICD-10-CM

## 2017-10-06 DIAGNOSIS — N17.9 ACUTE KIDNEY INJURY (H): ICD-10-CM

## 2017-10-06 DIAGNOSIS — Z93.0 TRACHEOSTOMY IN PLACE (H): ICD-10-CM

## 2017-10-06 DIAGNOSIS — Z95.2 S/P AVR (AORTIC VALVE REPLACEMENT): Primary | ICD-10-CM

## 2017-10-06 DIAGNOSIS — I21.4 NSTEMI (NON-ST ELEVATED MYOCARDIAL INFARCTION) (H): ICD-10-CM

## 2017-10-06 DIAGNOSIS — I10 HYPERTENSION GOAL BP (BLOOD PRESSURE) < 140/90: ICD-10-CM

## 2017-10-06 DIAGNOSIS — I95.9 HYPOTENSION, UNSPECIFIED HYPOTENSION TYPE: ICD-10-CM

## 2017-10-06 DIAGNOSIS — I71.00 DISSECTION OF AORTA, UNSPECIFIED PORTION OF AORTA (H): ICD-10-CM

## 2017-10-06 DIAGNOSIS — T88.8XXA FLUID COLLECTION AT SURGICAL SITE, INITIAL ENCOUNTER: ICD-10-CM

## 2017-10-06 DIAGNOSIS — R79.89 ELEVATED TROPONIN: ICD-10-CM

## 2017-10-06 DIAGNOSIS — I63.10 CEREBRAL INFARCTION DUE TO EMBOLISM OF PRECEREBRAL ARTERY (H): ICD-10-CM

## 2017-10-06 DIAGNOSIS — A41.89 SEPSIS DUE TO OTHER ETIOLOGY (H): ICD-10-CM

## 2017-10-06 DIAGNOSIS — R65.20 SEVERE SEPSIS (H): ICD-10-CM

## 2017-10-06 DIAGNOSIS — R41.82 ALTERED MENTAL STATUS, UNSPECIFIED ALTERED MENTAL STATUS TYPE: ICD-10-CM

## 2017-10-06 DIAGNOSIS — A41.9 SEVERE SEPSIS (H): ICD-10-CM

## 2017-10-06 DIAGNOSIS — Z98.890 H/O AORTIC ROOT REPAIR: ICD-10-CM

## 2017-10-06 DIAGNOSIS — I33.0 ACUTE BACTERIAL ENDOCARDITIS: ICD-10-CM

## 2017-10-06 PROBLEM — I38 ENDOCARDITIS: Status: ACTIVE | Noted: 2017-10-06

## 2017-10-06 LAB
ABO + RH BLD: NORMAL
ABO + RH BLD: NORMAL
ALBUMIN SERPL-MCNC: 2.8 G/DL (ref 3.4–5)
ALBUMIN UR-MCNC: NEGATIVE MG/DL
ALP SERPL-CCNC: 85 U/L (ref 40–150)
ALT SERPL W P-5'-P-CCNC: 33 U/L (ref 0–70)
ANION GAP SERPL CALCULATED.3IONS-SCNC: 9 MMOL/L (ref 3–14)
ANION GAP SERPL CALCULATED.3IONS-SCNC: 9 MMOL/L (ref 3–14)
APPEARANCE FLD: NORMAL
APPEARANCE UR: CLEAR
APTT PPP: 32 SEC (ref 22–37)
APTT PPP: 32 SEC (ref 22–37)
AST SERPL W P-5'-P-CCNC: 24 U/L (ref 0–45)
BASE DEFICIT BLDA-SCNC: 3.1 MMOL/L
BASE EXCESS BLDA CALC-SCNC: 2.4 MMOL/L
BASOPHILS # BLD AUTO: 0 10E9/L (ref 0–0.2)
BASOPHILS NFR BLD AUTO: 0.2 %
BILIRUB SERPL-MCNC: 1.5 MG/DL (ref 0.2–1.3)
BILIRUB UR QL STRIP: NEGATIVE
BLD GP AB SCN SERPL QL: NORMAL
BLOOD BANK CMNT PATIENT-IMP: NORMAL
BUN SERPL-MCNC: 18 MG/DL (ref 7–30)
BUN SERPL-MCNC: 20 MG/DL (ref 7–30)
CALCIUM SERPL-MCNC: 7 MG/DL (ref 8.5–10.1)
CALCIUM SERPL-MCNC: 8.7 MG/DL (ref 8.5–10.1)
CHLORIDE SERPL-SCNC: 105 MMOL/L (ref 94–109)
CHLORIDE SERPL-SCNC: 96 MMOL/L (ref 94–109)
CK SERPL-CCNC: 90 U/L (ref 30–300)
CO2 BLDCOV-SCNC: 24 MMOL/L (ref 21–28)
CO2 BLDCOV-SCNC: 29 MMOL/L (ref 21–28)
CO2 SERPL-SCNC: 24 MMOL/L (ref 20–32)
CO2 SERPL-SCNC: 29 MMOL/L (ref 20–32)
COLOR FLD: NORMAL
COLOR UR AUTO: YELLOW
CREAT SERPL-MCNC: 1.07 MG/DL (ref 0.66–1.25)
CREAT SERPL-MCNC: 1.33 MG/DL (ref 0.66–1.25)
CRP SERPL-MCNC: 310 MG/L (ref 0–8)
CRYSTALS SNV MICRO: ABNORMAL
DIFFERENTIAL METHOD BLD: ABNORMAL
EOSINOPHIL # BLD AUTO: 0 10E9/L (ref 0–0.7)
EOSINOPHIL NFR BLD AUTO: 0 %
ERYTHROCYTE [DISTWIDTH] IN BLOOD BY AUTOMATED COUNT: 13.7 % (ref 10–15)
ERYTHROCYTE [DISTWIDTH] IN BLOOD BY AUTOMATED COUNT: 14.2 % (ref 10–15)
GFR SERPL CREATININE-BSD FRML MDRD: 54 ML/MIN/1.7M2
GFR SERPL CREATININE-BSD FRML MDRD: 69 ML/MIN/1.7M2
GLUCOSE BLDC GLUCOMTR-MCNC: 124 MG/DL (ref 70–99)
GLUCOSE SERPL-MCNC: 130 MG/DL (ref 70–99)
GLUCOSE SERPL-MCNC: 162 MG/DL (ref 70–99)
GLUCOSE UR STRIP-MCNC: NEGATIVE MG/DL
HCO3 BLD-SCNC: 23 MMOL/L (ref 21–28)
HCO3 BLD-SCNC: 26 MMOL/L (ref 21–28)
HCT VFR BLD AUTO: 39.4 % (ref 40–53)
HCT VFR BLD AUTO: 43.8 % (ref 40–53)
HGB BLD-MCNC: 13 G/DL (ref 13.3–17.7)
HGB BLD-MCNC: 15 G/DL (ref 13.3–17.7)
HGB UR QL STRIP: ABNORMAL
IMM GRANULOCYTES # BLD: 0.2 10E9/L (ref 0–0.4)
IMM GRANULOCYTES NFR BLD: 1.4 %
INR PPP: 1.23 (ref 0.86–1.14)
INR PPP: 1.25 (ref 0.86–1.14)
INTERPRETATION ECG - MUSE: NORMAL
KETONES UR STRIP-MCNC: NEGATIVE MG/DL
LACTATE BLD-SCNC: 1.9 MMOL/L (ref 0.7–2)
LACTATE BLD-SCNC: 1.9 MMOL/L (ref 0.7–2.1)
LACTATE BLD-SCNC: 3.3 MMOL/L (ref 0.7–2)
LACTATE BLD-SCNC: 3.4 MMOL/L (ref 0.7–2.1)
LEUKOCYTE ESTERASE UR QL STRIP: NEGATIVE
LYMPHOCYTES # BLD AUTO: 0.4 10E9/L (ref 0.8–5.3)
LYMPHOCYTES NFR BLD AUTO: 2.5 %
MAGNESIUM SERPL-MCNC: 1.5 MG/DL (ref 1.6–2.3)
MAGNESIUM SERPL-MCNC: 2 MG/DL (ref 1.6–2.3)
MCH RBC QN AUTO: 30.1 PG (ref 26.5–33)
MCH RBC QN AUTO: 30.3 PG (ref 26.5–33)
MCHC RBC AUTO-ENTMCNC: 33 G/DL (ref 31.5–36.5)
MCHC RBC AUTO-ENTMCNC: 34.2 G/DL (ref 31.5–36.5)
MCV RBC AUTO: 88 FL (ref 78–100)
MCV RBC AUTO: 92 FL (ref 78–100)
MONOCYTES # BLD AUTO: 0.7 10E9/L (ref 0–1.3)
MONOCYTES NFR BLD AUTO: 3.9 %
MONOS+MACROS NFR FLD MANUAL: 3 %
MRSA DNA SPEC QL NAA+PROBE: NEGATIVE
MUCOUS THREADS #/AREA URNS LPF: PRESENT /LPF
NEUTROPHILS # BLD AUTO: 15.7 10E9/L (ref 1.6–8.3)
NEUTROPHILS NFR BLD AUTO: 92 %
NEUTS BAND NFR FLD MANUAL: 97 %
NITRATE UR QL: NEGATIVE
NRBC # BLD AUTO: 0 10*3/UL
NRBC BLD AUTO-RTO: 0 /100
O2/TOTAL GAS SETTING VFR VENT: 40 %
O2/TOTAL GAS SETTING VFR VENT: 60 %
OXYHGB MFR BLD: 94 % (ref 92–100)
PCO2 BLD: 35 MM HG (ref 35–45)
PCO2 BLD: 43 MM HG (ref 35–45)
PCO2 BLDV: 40 MM HG (ref 40–50)
PCO2 BLDV: 50 MM HG (ref 40–50)
PH BLD: 7.33 PH (ref 7.35–7.45)
PH BLD: 7.48 PH (ref 7.35–7.45)
PH BLDV: 7.29 PH (ref 7.32–7.43)
PH BLDV: 7.48 PH (ref 7.32–7.43)
PH UR STRIP: 6 PH (ref 5–7)
PHOSPHATE SERPL-MCNC: 1.5 MG/DL (ref 2.5–4.5)
PHOSPHATE SERPL-MCNC: 2.7 MG/DL (ref 2.5–4.5)
PLATELET # BLD AUTO: 150 10E9/L (ref 150–450)
PLATELET # BLD AUTO: 185 10E9/L (ref 150–450)
PO2 BLD: 103 MM HG (ref 80–105)
PO2 BLD: 73 MM HG (ref 80–105)
PO2 BLDV: 21 MM HG (ref 25–47)
PO2 BLDV: 36 MM HG (ref 25–47)
POTASSIUM SERPL-SCNC: 3.2 MMOL/L (ref 3.4–5.3)
POTASSIUM SERPL-SCNC: 3.2 MMOL/L (ref 3.4–5.3)
POTASSIUM SERPL-SCNC: 3.4 MMOL/L (ref 3.4–5.3)
PROT SERPL-MCNC: 6.7 G/DL (ref 6.8–8.8)
RADIOLOGIST FLAGS: ABNORMAL
RBC # BLD AUTO: 4.29 10E12/L (ref 4.4–5.9)
RBC # BLD AUTO: 4.98 10E12/L (ref 4.4–5.9)
RBC # FLD: NORMAL /UL
RBC #/AREA URNS AUTO: 2 /HPF (ref 0–2)
SAO2 % BLDV FROM PO2: 38 %
SAO2 % BLDV FROM PO2: 61 %
SODIUM SERPL-SCNC: 134 MMOL/L (ref 133–144)
SODIUM SERPL-SCNC: 138 MMOL/L (ref 133–144)
SOURCE: ABNORMAL
SP GR UR STRIP: 1.01 (ref 1–1.03)
SPECIMEN EXP DATE BLD: NORMAL
SPECIMEN SOURCE FLD: NORMAL
SPECIMEN SOURCE: NORMAL
TROPONIN I SERPL-MCNC: 0.45 UG/L (ref 0–0.04)
TROPONIN I SERPL-MCNC: 0.57 UG/L (ref 0–0.04)
UROBILINOGEN UR STRIP-MCNC: 0 MG/DL (ref 0–2)
WBC # BLD AUTO: 14.2 10E9/L (ref 4–11)
WBC # BLD AUTO: 17.1 10E9/L (ref 4–11)
WBC # FLD AUTO: 3897 /UL
WBC #/AREA URNS AUTO: <1 /HPF (ref 0–2)

## 2017-10-06 PROCEDURE — 80053 COMPREHEN METABOLIC PANEL: CPT | Performed by: EMERGENCY MEDICINE

## 2017-10-06 PROCEDURE — 00000146 ZZHCL STATISTIC GLUCOSE BY METER IP

## 2017-10-06 PROCEDURE — 25000128 H RX IP 250 OP 636: Performed by: THORACIC SURGERY (CARDIOTHORACIC VASCULAR SURGERY)

## 2017-10-06 PROCEDURE — A9585 GADOBUTROL INJECTION: HCPCS | Performed by: THORACIC SURGERY (CARDIOTHORACIC VASCULAR SURGERY)

## 2017-10-06 PROCEDURE — 40000276 ZZH STATISTIC RCP TIME ED VENT EA 10 MIN

## 2017-10-06 PROCEDURE — 80048 BASIC METABOLIC PNL TOTAL CA: CPT | Performed by: EMERGENCY MEDICINE

## 2017-10-06 PROCEDURE — 87641 MR-STAPH DNA AMP PROBE: CPT | Performed by: THORACIC SURGERY (CARDIOTHORACIC VASCULAR SURGERY)

## 2017-10-06 PROCEDURE — S0028 INJECTION, FAMOTIDINE, 20 MG: HCPCS | Performed by: THORACIC SURGERY (CARDIOTHORACIC VASCULAR SURGERY)

## 2017-10-06 PROCEDURE — 87186 SC STD MICRODIL/AGAR DIL: CPT | Performed by: STUDENT IN AN ORGANIZED HEALTH CARE EDUCATION/TRAINING PROGRAM

## 2017-10-06 PROCEDURE — 86901 BLOOD TYPING SEROLOGIC RH(D): CPT | Performed by: EMERGENCY MEDICINE

## 2017-10-06 PROCEDURE — 36415 COLL VENOUS BLD VENIPUNCTURE: CPT

## 2017-10-06 PROCEDURE — 96367 TX/PROPH/DG ADDL SEQ IV INF: CPT | Mod: 59

## 2017-10-06 PROCEDURE — 84484 ASSAY OF TROPONIN QUANT: CPT | Performed by: EMERGENCY MEDICINE

## 2017-10-06 PROCEDURE — 5A1955Z RESPIRATORY VENTILATION, GREATER THAN 96 CONSECUTIVE HOURS: ICD-10-PCS | Performed by: THORACIC SURGERY (CARDIOTHORACIC VASCULAR SURGERY)

## 2017-10-06 PROCEDURE — 84100 ASSAY OF PHOSPHORUS: CPT | Performed by: EMERGENCY MEDICINE

## 2017-10-06 PROCEDURE — 87076 CULTURE ANAEROBE IDENT EACH: CPT | Performed by: STUDENT IN AN ORGANIZED HEALTH CARE EDUCATION/TRAINING PROGRAM

## 2017-10-06 PROCEDURE — 25800025 ZZH RX 258: Performed by: THORACIC SURGERY (CARDIOTHORACIC VASCULAR SURGERY)

## 2017-10-06 PROCEDURE — 40000986 XR CHEST PORT 1 VW

## 2017-10-06 PROCEDURE — 87040 BLOOD CULTURE FOR BACTERIA: CPT | Performed by: EMERGENCY MEDICINE

## 2017-10-06 PROCEDURE — 87070 CULTURE OTHR SPECIMN AEROBIC: CPT | Performed by: STUDENT IN AN ORGANIZED HEALTH CARE EDUCATION/TRAINING PROGRAM

## 2017-10-06 PROCEDURE — 40000275 ZZH STATISTIC RCP TIME EA 10 MIN

## 2017-10-06 PROCEDURE — 25000125 ZZHC RX 250: Performed by: EMERGENCY MEDICINE

## 2017-10-06 PROCEDURE — 86140 C-REACTIVE PROTEIN: CPT | Performed by: THORACIC SURGERY (CARDIOTHORACIC VASCULAR SURGERY)

## 2017-10-06 PROCEDURE — 82805 BLOOD GASES W/O2 SATURATION: CPT | Performed by: THORACIC SURGERY (CARDIOTHORACIC VASCULAR SURGERY)

## 2017-10-06 PROCEDURE — 25000128 H RX IP 250 OP 636

## 2017-10-06 PROCEDURE — 96361 HYDRATE IV INFUSION ADD-ON: CPT

## 2017-10-06 PROCEDURE — 25000132 ZZH RX MED GY IP 250 OP 250 PS 637: Performed by: SURGERY

## 2017-10-06 PROCEDURE — 93306 TTE W/DOPPLER COMPLETE: CPT | Mod: 26 | Performed by: INTERNAL MEDICINE

## 2017-10-06 PROCEDURE — 83735 ASSAY OF MAGNESIUM: CPT | Performed by: EMERGENCY MEDICINE

## 2017-10-06 PROCEDURE — 25000132 ZZH RX MED GY IP 250 OP 250 PS 637: Performed by: THORACIC SURGERY (CARDIOTHORACIC VASCULAR SURGERY)

## 2017-10-06 PROCEDURE — 25000128 H RX IP 250 OP 636: Performed by: EMERGENCY MEDICINE

## 2017-10-06 PROCEDURE — 87075 CULTR BACTERIA EXCEPT BLOOD: CPT | Performed by: STUDENT IN AN ORGANIZED HEALTH CARE EDUCATION/TRAINING PROGRAM

## 2017-10-06 PROCEDURE — 40000281 ZZH STATISTIC TRANSPORT TIME EA 15 MIN

## 2017-10-06 PROCEDURE — 85610 PROTHROMBIN TIME: CPT | Performed by: EMERGENCY MEDICINE

## 2017-10-06 PROCEDURE — 36600 WITHDRAWAL OF ARTERIAL BLOOD: CPT

## 2017-10-06 PROCEDURE — 86900 BLOOD TYPING SEROLOGIC ABO: CPT | Performed by: EMERGENCY MEDICINE

## 2017-10-06 PROCEDURE — 74177 CT ABD & PELVIS W/CONTRAST: CPT

## 2017-10-06 PROCEDURE — 87077 CULTURE AEROBIC IDENTIFY: CPT | Performed by: EMERGENCY MEDICINE

## 2017-10-06 PROCEDURE — 82803 BLOOD GASES ANY COMBINATION: CPT

## 2017-10-06 PROCEDURE — 99285 EMERGENCY DEPT VISIT HI MDM: CPT | Mod: 25

## 2017-10-06 PROCEDURE — 83735 ASSAY OF MAGNESIUM: CPT | Performed by: THORACIC SURGERY (CARDIOTHORACIC VASCULAR SURGERY)

## 2017-10-06 PROCEDURE — 94002 VENT MGMT INPAT INIT DAY: CPT

## 2017-10-06 PROCEDURE — 83605 ASSAY OF LACTIC ACID: CPT

## 2017-10-06 PROCEDURE — 40000264 ECHO COMPLETE WITH LUMASON

## 2017-10-06 PROCEDURE — 82803 BLOOD GASES ANY COMBINATION: CPT | Performed by: EMERGENCY MEDICINE

## 2017-10-06 PROCEDURE — 27210131 ZZH KIT ART LINE INSERTION

## 2017-10-06 PROCEDURE — 81001 URINALYSIS AUTO W/SCOPE: CPT | Performed by: EMERGENCY MEDICINE

## 2017-10-06 PROCEDURE — 25000125 ZZHC RX 250: Performed by: STUDENT IN AN ORGANIZED HEALTH CARE EDUCATION/TRAINING PROGRAM

## 2017-10-06 PROCEDURE — 20000004 ZZH R&B ICU UMMC

## 2017-10-06 PROCEDURE — 71010 XR CHEST PORT 1 VW: CPT

## 2017-10-06 PROCEDURE — 85730 THROMBOPLASTIN TIME PARTIAL: CPT | Performed by: EMERGENCY MEDICINE

## 2017-10-06 PROCEDURE — 84132 ASSAY OF SERUM POTASSIUM: CPT | Performed by: THORACIC SURGERY (CARDIOTHORACIC VASCULAR SURGERY)

## 2017-10-06 PROCEDURE — 89060 EXAM SYNOVIAL FLUID CRYSTALS: CPT | Performed by: STUDENT IN AN ORGANIZED HEALTH CARE EDUCATION/TRAINING PROGRAM

## 2017-10-06 PROCEDURE — 93005 ELECTROCARDIOGRAM TRACING: CPT | Mod: 59

## 2017-10-06 PROCEDURE — 25000125 ZZHC RX 250: Performed by: THORACIC SURGERY (CARDIOTHORACIC VASCULAR SURGERY)

## 2017-10-06 PROCEDURE — 87640 STAPH A DNA AMP PROBE: CPT | Performed by: THORACIC SURGERY (CARDIOTHORACIC VASCULAR SURGERY)

## 2017-10-06 PROCEDURE — 83605 ASSAY OF LACTIC ACID: CPT | Performed by: EMERGENCY MEDICINE

## 2017-10-06 PROCEDURE — 89051 BODY FLUID CELL COUNT: CPT | Performed by: STUDENT IN AN ORGANIZED HEALTH CARE EDUCATION/TRAINING PROGRAM

## 2017-10-06 PROCEDURE — 73560 X-RAY EXAM OF KNEE 1 OR 2: CPT | Mod: LT

## 2017-10-06 PROCEDURE — 36556 INSERT NON-TUNNEL CV CATH: CPT

## 2017-10-06 PROCEDURE — 25000128 H RX IP 250 OP 636: Performed by: STUDENT IN AN ORGANIZED HEALTH CARE EDUCATION/TRAINING PROGRAM

## 2017-10-06 PROCEDURE — 87077 CULTURE AEROBIC IDENTIFY: CPT | Performed by: STUDENT IN AN ORGANIZED HEALTH CARE EDUCATION/TRAINING PROGRAM

## 2017-10-06 PROCEDURE — A7035 POS AIRWAY PRESS HEADGEAR: HCPCS

## 2017-10-06 PROCEDURE — 70553 MRI BRAIN STEM W/O & W/DYE: CPT

## 2017-10-06 PROCEDURE — 87186 SC STD MICRODIL/AGAR DIL: CPT | Performed by: EMERGENCY MEDICINE

## 2017-10-06 PROCEDURE — 83605 ASSAY OF LACTIC ACID: CPT | Mod: 91 | Performed by: EMERGENCY MEDICINE

## 2017-10-06 PROCEDURE — 72125 CT NECK SPINE W/O DYE: CPT

## 2017-10-06 PROCEDURE — 82550 ASSAY OF CK (CPK): CPT | Performed by: EMERGENCY MEDICINE

## 2017-10-06 PROCEDURE — 86850 RBC ANTIBODY SCREEN: CPT | Performed by: EMERGENCY MEDICINE

## 2017-10-06 PROCEDURE — 85025 COMPLETE CBC W/AUTO DIFF WBC: CPT | Performed by: EMERGENCY MEDICINE

## 2017-10-06 PROCEDURE — 85027 COMPLETE CBC AUTOMATED: CPT | Performed by: EMERGENCY MEDICINE

## 2017-10-06 PROCEDURE — 25500064 ZZH RX 255 OP 636: Performed by: EMERGENCY MEDICINE

## 2017-10-06 PROCEDURE — 87800 DETECT AGNT MULT DNA DIREC: CPT | Performed by: EMERGENCY MEDICINE

## 2017-10-06 PROCEDURE — 71260 CT THORAX DX C+: CPT

## 2017-10-06 PROCEDURE — 70450 CT HEAD/BRAIN W/O DYE: CPT

## 2017-10-06 PROCEDURE — 96375 TX/PRO/DX INJ NEW DRUG ADDON: CPT | Mod: 59

## 2017-10-06 PROCEDURE — 96365 THER/PROPH/DIAG IV INF INIT: CPT | Mod: 59

## 2017-10-06 PROCEDURE — 84100 ASSAY OF PHOSPHORUS: CPT | Performed by: THORACIC SURGERY (CARDIOTHORACIC VASCULAR SURGERY)

## 2017-10-06 RX ORDER — DEXTROSE MONOHYDRATE, SODIUM CHLORIDE, AND POTASSIUM CHLORIDE 50; 1.49; 4.5 G/1000ML; G/1000ML; G/1000ML
INJECTION, SOLUTION INTRAVENOUS CONTINUOUS
Status: DISCONTINUED | OUTPATIENT
Start: 2017-10-06 | End: 2017-10-09

## 2017-10-06 RX ORDER — PROCHLORPERAZINE MALEATE 5 MG
5-10 TABLET ORAL EVERY 6 HOURS PRN
Status: DISCONTINUED | OUTPATIENT
Start: 2017-10-06 | End: 2017-10-07

## 2017-10-06 RX ORDER — DEXMEDETOMIDINE HYDROCHLORIDE 4 UG/ML
.2-.7 INJECTION, SOLUTION INTRAVENOUS CONTINUOUS
Status: DISCONTINUED | OUTPATIENT
Start: 2017-10-06 | End: 2017-10-07

## 2017-10-06 RX ORDER — POTASSIUM CHLORIDE 1.5 G/1.58G
20-40 POWDER, FOR SOLUTION ORAL
Status: DISCONTINUED | OUTPATIENT
Start: 2017-10-06 | End: 2017-11-04 | Stop reason: HOSPADM

## 2017-10-06 RX ORDER — PROPOFOL 10 MG/ML
5-75 INJECTION, EMULSION INTRAVENOUS CONTINUOUS
Status: DISCONTINUED | OUTPATIENT
Start: 2017-10-06 | End: 2017-10-07

## 2017-10-06 RX ORDER — LIDOCAINE 40 MG/G
CREAM TOPICAL
Status: DISCONTINUED | OUTPATIENT
Start: 2017-10-06 | End: 2017-11-04 | Stop reason: HOSPADM

## 2017-10-06 RX ORDER — SODIUM CHLORIDE 9 MG/ML
INJECTION, SOLUTION INTRAVENOUS
Status: DISCONTINUED
Start: 2017-10-06 | End: 2017-10-08 | Stop reason: HOSPADM

## 2017-10-06 RX ORDER — PIPERACILLIN SODIUM, TAZOBACTAM SODIUM 3; .375 G/15ML; G/15ML
3.38 INJECTION, POWDER, LYOPHILIZED, FOR SOLUTION INTRAVENOUS ONCE
Status: DISCONTINUED | OUTPATIENT
Start: 2017-10-06 | End: 2017-10-06

## 2017-10-06 RX ORDER — ALBUMIN, HUMAN INJ 5% 5 %
500-1000 SOLUTION INTRAVENOUS
Status: DISCONTINUED | OUTPATIENT
Start: 2017-10-06 | End: 2017-10-13

## 2017-10-06 RX ORDER — NALOXONE HYDROCHLORIDE 0.4 MG/ML
.1-.4 INJECTION, SOLUTION INTRAMUSCULAR; INTRAVENOUS; SUBCUTANEOUS
Status: DISCONTINUED | OUTPATIENT
Start: 2017-10-06 | End: 2017-10-06

## 2017-10-06 RX ORDER — DIPHENHYDRAMINE HYDROCHLORIDE 50 MG/ML
25 INJECTION INTRAMUSCULAR; INTRAVENOUS EVERY 6 HOURS PRN
Status: DISCONTINUED | OUTPATIENT
Start: 2017-10-06 | End: 2017-11-04 | Stop reason: HOSPADM

## 2017-10-06 RX ORDER — ALBUTEROL SULFATE 0.83 MG/ML
2.5 SOLUTION RESPIRATORY (INHALATION)
Status: DISCONTINUED | OUTPATIENT
Start: 2017-10-06 | End: 2017-11-04 | Stop reason: HOSPADM

## 2017-10-06 RX ORDER — ONDANSETRON 4 MG/1
4 TABLET, ORALLY DISINTEGRATING ORAL EVERY 6 HOURS PRN
Status: DISCONTINUED | OUTPATIENT
Start: 2017-10-06 | End: 2017-10-11

## 2017-10-06 RX ORDER — IOPAMIDOL 755 MG/ML
500 INJECTION, SOLUTION INTRAVASCULAR ONCE
Status: COMPLETED | OUTPATIENT
Start: 2017-10-06 | End: 2017-10-06

## 2017-10-06 RX ORDER — HYDROMORPHONE HYDROCHLORIDE 1 MG/ML
.3-.5 INJECTION, SOLUTION INTRAMUSCULAR; INTRAVENOUS; SUBCUTANEOUS
Status: DISCONTINUED | OUTPATIENT
Start: 2017-10-06 | End: 2017-10-07

## 2017-10-06 RX ORDER — DIPHENHYDRAMINE HCL 25 MG
25 CAPSULE ORAL EVERY 6 HOURS PRN
Status: DISCONTINUED | OUTPATIENT
Start: 2017-10-06 | End: 2017-11-04 | Stop reason: HOSPADM

## 2017-10-06 RX ORDER — FENTANYL CITRATE 50 UG/ML
INJECTION, SOLUTION INTRAMUSCULAR; INTRAVENOUS
Status: COMPLETED
Start: 2017-10-06 | End: 2017-10-06

## 2017-10-06 RX ORDER — MAGNESIUM SULFATE HEPTAHYDRATE 500 MG/ML
2 INJECTION, SOLUTION INTRAMUSCULAR; INTRAVENOUS ONCE
Status: DISCONTINUED | OUTPATIENT
Start: 2017-10-06 | End: 2017-10-06

## 2017-10-06 RX ORDER — POTASSIUM CHLORIDE 14.9 MG/ML
20 INJECTION INTRAVENOUS
Status: DISCONTINUED | OUTPATIENT
Start: 2017-10-06 | End: 2017-10-22 | Stop reason: RX

## 2017-10-06 RX ORDER — POTASSIUM CHLORIDE 7.45 MG/ML
10 INJECTION INTRAVENOUS
Status: DISCONTINUED | OUTPATIENT
Start: 2017-10-06 | End: 2017-11-04 | Stop reason: HOSPADM

## 2017-10-06 RX ORDER — DEXTROSE MONOHYDRATE 25 G/50ML
25-50 INJECTION, SOLUTION INTRAVENOUS
Status: DISCONTINUED | OUTPATIENT
Start: 2017-10-06 | End: 2017-10-20

## 2017-10-06 RX ORDER — POTASSIUM CHLORIDE 750 MG/1
20-40 TABLET, EXTENDED RELEASE ORAL
Status: DISCONTINUED | OUTPATIENT
Start: 2017-10-06 | End: 2017-11-04 | Stop reason: HOSPADM

## 2017-10-06 RX ORDER — HEPARIN SODIUM 5000 [USP'U]/.5ML
5000 INJECTION, SOLUTION INTRAVENOUS; SUBCUTANEOUS EVERY 8 HOURS
Status: DISCONTINUED | OUTPATIENT
Start: 2017-10-07 | End: 2017-10-07

## 2017-10-06 RX ORDER — FENTANYL CITRATE 50 UG/ML
50-100 INJECTION, SOLUTION INTRAMUSCULAR; INTRAVENOUS
Status: DISCONTINUED | OUTPATIENT
Start: 2017-10-06 | End: 2017-10-26

## 2017-10-06 RX ORDER — AMOXICILLIN 250 MG
1-2 CAPSULE ORAL 2 TIMES DAILY
Status: DISCONTINUED | OUTPATIENT
Start: 2017-10-06 | End: 2017-10-07

## 2017-10-06 RX ORDER — MEPERIDINE HYDROCHLORIDE 25 MG/ML
12.5-25 INJECTION INTRAMUSCULAR; INTRAVENOUS; SUBCUTANEOUS
Status: DISCONTINUED | OUTPATIENT
Start: 2017-10-06 | End: 2017-10-12

## 2017-10-06 RX ORDER — MAGNESIUM SULFATE HEPTAHYDRATE 40 MG/ML
4 INJECTION, SOLUTION INTRAVENOUS EVERY 4 HOURS PRN
Status: DISCONTINUED | OUTPATIENT
Start: 2017-10-06 | End: 2017-11-04 | Stop reason: HOSPADM

## 2017-10-06 RX ORDER — ASPIRIN 81 MG/1
81 TABLET, CHEWABLE ORAL DAILY
Status: DISCONTINUED | OUTPATIENT
Start: 2017-10-07 | End: 2017-10-07

## 2017-10-06 RX ORDER — ALBUTEROL SULFATE 90 UG/1
6 AEROSOL, METERED RESPIRATORY (INHALATION) EVERY 4 HOURS PRN
Status: DISCONTINUED | OUTPATIENT
Start: 2017-10-06 | End: 2017-11-04 | Stop reason: HOSPADM

## 2017-10-06 RX ORDER — SODIUM CHLORIDE 9 MG/ML
1000 INJECTION, SOLUTION INTRAVENOUS CONTINUOUS
Status: DISCONTINUED | OUTPATIENT
Start: 2017-10-06 | End: 2017-10-06 | Stop reason: HOSPADM

## 2017-10-06 RX ORDER — FENTANYL CITRATE 50 UG/ML
50 INJECTION, SOLUTION INTRAMUSCULAR; INTRAVENOUS ONCE
Status: COMPLETED | OUTPATIENT
Start: 2017-10-06 | End: 2017-10-06

## 2017-10-06 RX ORDER — PIPERACILLIN SODIUM, TAZOBACTAM SODIUM 4; .5 G/20ML; G/20ML
4.5 INJECTION, POWDER, LYOPHILIZED, FOR SOLUTION INTRAVENOUS EVERY 6 HOURS
Status: DISCONTINUED | OUTPATIENT
Start: 2017-10-06 | End: 2017-10-06

## 2017-10-06 RX ORDER — PROPOFOL 10 MG/ML
10-20 INJECTION, EMULSION INTRAVENOUS EVERY 30 MIN PRN
Status: DISCONTINUED | OUTPATIENT
Start: 2017-10-06 | End: 2017-10-07

## 2017-10-06 RX ORDER — MUPIROCIN 20 MG/G
0.5 OINTMENT TOPICAL 2 TIMES DAILY
Status: DISCONTINUED | OUTPATIENT
Start: 2017-10-06 | End: 2017-10-10

## 2017-10-06 RX ORDER — NICOTINE POLACRILEX 4 MG
15-30 LOZENGE BUCCAL
Status: DISCONTINUED | OUTPATIENT
Start: 2017-10-06 | End: 2017-10-20

## 2017-10-06 RX ORDER — POTASSIUM CHLORIDE 7.45 MG/ML
10 INJECTION INTRAVENOUS CONTINUOUS
Status: DISCONTINUED | OUTPATIENT
Start: 2017-10-06 | End: 2017-10-07

## 2017-10-06 RX ORDER — ONDANSETRON 2 MG/ML
4 INJECTION INTRAMUSCULAR; INTRAVENOUS EVERY 6 HOURS PRN
Status: DISCONTINUED | OUTPATIENT
Start: 2017-10-06 | End: 2017-10-11

## 2017-10-06 RX ORDER — POTASSIUM CL/LIDO/0.9 % NACL 10MEQ/0.1L
10 INTRAVENOUS SOLUTION, PIGGYBACK (ML) INTRAVENOUS
Status: DISCONTINUED | OUTPATIENT
Start: 2017-10-06 | End: 2017-11-04 | Stop reason: HOSPADM

## 2017-10-06 RX ORDER — NALOXONE HYDROCHLORIDE 0.4 MG/ML
.1-.4 INJECTION, SOLUTION INTRAMUSCULAR; INTRAVENOUS; SUBCUTANEOUS
Status: DISCONTINUED | OUTPATIENT
Start: 2017-10-06 | End: 2017-11-04 | Stop reason: HOSPADM

## 2017-10-06 RX ORDER — HYDRALAZINE HYDROCHLORIDE 20 MG/ML
10 INJECTION INTRAMUSCULAR; INTRAVENOUS EVERY 30 MIN PRN
Status: DISCONTINUED | OUTPATIENT
Start: 2017-10-06 | End: 2017-10-10

## 2017-10-06 RX ORDER — GADOBUTROL 604.72 MG/ML
10 INJECTION INTRAVENOUS ONCE
Status: COMPLETED | OUTPATIENT
Start: 2017-10-06 | End: 2017-10-06

## 2017-10-06 RX ADMIN — POTASSIUM CHLORIDE, DEXTROSE MONOHYDRATE AND SODIUM CHLORIDE: 150; 5; 450 INJECTION, SOLUTION INTRAVENOUS at 23:07

## 2017-10-06 RX ADMIN — FENTANYL CITRATE 50 MCG: 50 INJECTION, SOLUTION INTRAMUSCULAR; INTRAVENOUS at 11:50

## 2017-10-06 RX ADMIN — IOPAMIDOL 100 ML: 755 INJECTION, SOLUTION INTRAVENOUS at 10:44

## 2017-10-06 RX ADMIN — TAZOBACTAM SODIUM AND PIPERACILLIN SODIUM 4.5 G: 500; 4 INJECTION, SOLUTION INTRAVENOUS at 11:23

## 2017-10-06 RX ADMIN — POTASSIUM PHOSPHATE, MONOBASIC AND POTASSIUM PHOSPHATE, DIBASIC 20 MMOL: 224; 236 INJECTION, SOLUTION INTRAVENOUS at 23:33

## 2017-10-06 RX ADMIN — POTASSIUM CHLORIDE 20 MEQ: 1.5 POWDER, FOR SOLUTION ORAL at 23:34

## 2017-10-06 RX ADMIN — FENTANYL CITRATE 50 MCG: 50 INJECTION, SOLUTION INTRAMUSCULAR; INTRAVENOUS at 21:22

## 2017-10-06 RX ADMIN — PROPOFOL 15 MCG/KG/MIN: 10 INJECTION, EMULSION INTRAVENOUS at 17:59

## 2017-10-06 RX ADMIN — SODIUM CHLORIDE 1000 ML: 9 INJECTION, SOLUTION INTRAVENOUS at 10:28

## 2017-10-06 RX ADMIN — NOREPINEPHRINE BITARTRATE 0.03 MCG/KG/MIN: 1 INJECTION INTRAVENOUS at 13:47

## 2017-10-06 RX ADMIN — FENTANYL CITRATE 100 MCG: 50 INJECTION, SOLUTION INTRAMUSCULAR; INTRAVENOUS at 19:47

## 2017-10-06 RX ADMIN — VANCOMYCIN HYDROCHLORIDE 2000 MG: 1 INJECTION, POWDER, LYOPHILIZED, FOR SOLUTION INTRAVENOUS at 11:28

## 2017-10-06 RX ADMIN — SODIUM CHLORIDE 100 ML: 9 INJECTION, SOLUTION INTRAVENOUS at 22:43

## 2017-10-06 RX ADMIN — SULFUR HEXAFLUORIDE 5 ML: KIT at 15:26

## 2017-10-06 RX ADMIN — POTASSIUM CHLORIDE 40 MEQ: 1.5 POWDER, FOR SOLUTION ORAL at 20:05

## 2017-10-06 RX ADMIN — MIDAZOLAM 1 MG: 1 INJECTION INTRAMUSCULAR; INTRAVENOUS at 11:45

## 2017-10-06 RX ADMIN — FAMOTIDINE 20 MG: 10 INJECTION, SOLUTION INTRAVENOUS at 23:25

## 2017-10-06 RX ADMIN — SODIUM CHLORIDE 65 ML: 9 INJECTION, SOLUTION INTRAVENOUS at 10:44

## 2017-10-06 RX ADMIN — SODIUM CHLORIDE 2000 ML: 9 INJECTION, SOLUTION INTRAVENOUS at 10:52

## 2017-10-06 RX ADMIN — FENTANYL CITRATE 50 MCG/HR: 50 INJECTION, SOLUTION INTRAMUSCULAR; INTRAVENOUS at 23:13

## 2017-10-06 RX ADMIN — POTASSIUM CHLORIDE 10 MEQ: 7.46 INJECTION, SOLUTION INTRAVENOUS at 14:02

## 2017-10-06 RX ADMIN — PROPOFOL 10 MCG/KG/MIN: 10 INJECTION, EMULSION INTRAVENOUS at 19:09

## 2017-10-06 RX ADMIN — Medication 2 G: at 14:34

## 2017-10-06 RX ADMIN — MIDAZOLAM 2 MG: 1 INJECTION INTRAMUSCULAR; INTRAVENOUS at 15:12

## 2017-10-06 RX ADMIN — NICARDIPINE HYDROCHLORIDE 2.5 MG/HR: 2.5 INJECTION INTRAVENOUS at 18:19

## 2017-10-06 RX ADMIN — FENTANYL CITRATE 50 MCG: 50 INJECTION INTRAMUSCULAR; INTRAVENOUS at 11:50

## 2017-10-06 RX ADMIN — ACETAMINOPHEN 650 MG: 325 SOLUTION ORAL at 19:47

## 2017-10-06 RX ADMIN — GENTAMICIN SULFATE 230 MG: 40 INJECTION, SOLUTION INTRAMUSCULAR; INTRAVENOUS at 23:19

## 2017-10-06 RX ADMIN — GADOBUTROL 10 ML: 604.72 INJECTION INTRAVENOUS at 22:42

## 2017-10-06 RX ADMIN — MUPIROCIN 0.5 G: 20 OINTMENT TOPICAL at 23:19

## 2017-10-06 NOTE — PROGRESS NOTES
Pt's dentures given to pt's significant other. All belongings sent home with pt's significant other.

## 2017-10-06 NOTE — ED NOTES
Bed: ED01  Expected date: 10/6/17  Expected time: 12:46 PM  Means of arrival: Ambulance  Comments:  Cricket Miguel (MRN [id number])  Being sent by  Louis for further evaluation of flu like symptoms and fever.  AAA repair and today on CT possible Aortic dissection/leak at site.  WBC 17 Trop 0.44 Lactic 3.  Triple lumen RIJ and given 3L of IVF.  /86 HR 90 FiO2 - 2 L  Nasal cannula

## 2017-10-06 NOTE — H&P
History and Physical     Cricket Miguel MRN# [id number]   YOB: 1955 Age: 62 year old      Date of Admission:  10/6/2017        Chief Complaint:   CC: code White: transfer from OSH with endocarditis/aortic fluid collection s/p Bentall procedure         History of Present Illnes:     Cricket Miguel is a 62 year old male with a history sepsis of unknown source (questionable MRSA) in April 2016 which led to a discovery of aortic stenosis with ascending aortic aneurysm/CAD.  He underwent a Bentall procedure (aortic valve replacement and aortic root replacement with a composite graft constructed using a 27 mm St. Isidro Trifecta (bovine pericardial) valve and a 30 mm Mckenzie Cardioroot Valsalva tube graft), endoscopic vein harvest from the left lower extremity (reversed saphenous vein graft to OM) on 5/17/16 by Dr. Bowers.  He was discharged from the hospital with no signs of infection from his valve.  He has had no issues since then, nor has seen a cardiologist since his discharge.    He presents with his family (significant other, Meghna, his son and daughter) to the ED for evaluation of fall and altered mental status. Per EMS, the patient was discovered by his S.O. this morning after lying face down after she heard a loud thud. Per his S.O., he had been complaining of joint pain, minor fever 99.4 and not feeling well for the past 24 hours.  He denied any sick contact.  He went to bed last night, which S.O. slept on the  to avoid further sick contacts.  The patient is adentulous.  He did have his left knee tapped followed by a cortisol injection last month at an orthopaedic clinic, which they claim did not show any signs of infection.    Upon arrival of EMS, the patient was not responding for the first 20 minutes; the patient was only groaning. Afterwards, the patient became more aggravated and became incontinent. The patient was given one bag of fluids during transfer. During  transport, the patient s vitals were: 120 heart rate, 133 blood glucose, and 94% oxygen saturation on room air. He was intubated by EMS due to his confusion and respiratory distress.  He was also given a dose of vancomycin and Zosyn en route.  +leukocytosis.  Blood cultures have been drain with a garza placed.  A head/neck/chest/abd/pelv CT's were obtained, which showed fluid around the Bentall surgery.  He was transferred from [facility] for further evaluation.  CT surgery was consulted for the concerns for infectious source    Past Medical History:  Past Medical History:   Diagnosis Date     AI (aortic insufficiency)      Aortic root dilatation (H)      AR (aortic regurgitation)     severe     Cardiomyopathy (H)      CHF (congestive heart failure), NYHA class II (H)      COPD, mild (H)     spirometry 12/16     Heart murmur      Hyperlipidemia LDL goal <70 10/31/2010     Hypertension goal BP (blood pressure) < 140/90      Inguinal hernia      left lung nodule  1/29/2008     Major depressive disorder, single episode, moderate (H)      Psoriasis childhood     Tobacco use disorder        Past Surgical History:  Past Surgical History:   Procedure Laterality Date     HC SHOULDER ARTHROSCOPY, DX  2001    Arthroscopy, Shoulder RT Dr OSIRIS Andersen     HC SHOULDER ARTHROSCOPY, DX  1/04    LT arthroscopy Dr OSIRIS Andersen     HERNIA REPAIR, UMBILICAL  1/08    incarcerated - dr rockwell     HERNIORRHAPHY INGUINAL  12/21/2012    HERNIORRHAPHY INGUINAL;  Right Inguinal Hernia Repair with mesh ;  Surgeon: Mello Rockwell MD;  Location: [facility] OR     REPLACE VALVE AORTIC N/A 5/17/2016    REPLACE VALVE AORTIC - Dr Bowers     ROTATOR CUFF REPAIR RT/LT  11/10    Rt arthroscopy - Dr OSIRIS Andersen     SURGICAL HISTORY OF -   5/16    AVR, severe AR, aortic root repair       Allergies:     Allergies   Allergen Reactions     Lisinopril Swelling     One-sided facial swelling after being on lisinopril/HCTZ for one week.         Medications:    Current Facility-Administered Medications on File Prior to Encounter:  [COMPLETED] 0.9% sodium chloride BOLUS   [COMPLETED] iopamidol (ISOVUE-370) solution 500 mL   [COMPLETED] piperacillin-tazobactam (ZOSYN) intermittent infusion 4.5 g   [COMPLETED] 0.9% sodium chloride BOLUS   [COMPLETED] 0.9% sodium chloride BOLUS   [COMPLETED] vancomycin (VANCOCIN) 2,000 mg in NaCl 0.9 % 500 mL intermittent infusion   [COMPLETED] fentaNYL (PF) (SUBLIMAZE) injection 50 mcg   [COMPLETED] midazolam (VERSED) injection 1 mg   [DISCONTINUED] 0.9% sodium chloride infusion   [DISCONTINUED] norepinephrine (LEVOPHED) 16 mg in D5W 250 mL infusion     Current Outpatient Prescriptions on File Prior to Encounter:  hydrochlorothiazide (HYDRODIURIL) 25 MG tablet Take 1 tablet (25 mg) by mouth daily   atorvastatin (LIPITOR) 40 MG tablet Take 1 tablet (40 mg) by mouth daily   amLODIPine (NORVASC) 5 MG tablet Take 1 tablet (5 mg) by mouth 2 times daily   furosemide (LASIX) 20 MG tablet Take 1 tablet (20 mg) by mouth daily as needed If blood pressure above 130   carvedilol (COREG) 12.5 MG tablet Take 1 tablet (12.5 mg) by mouth 2 times daily (with meals)   aspirin  MG tablet Take 1 tablet (325 mg) by mouth daily       Social History:  Social History     Social History     Marital status:      Spouse name: N/A     Number of children: 3     Years of education: 12     Occupational History     Not on file.     Social History Main Topics     Smoking status: Former Smoker     Packs/day: 1.00     Years: 35.00     Quit date: 4/1/2016     Smokeless tobacco: Never Used      Comment: 4/2016     Alcohol use 0.0 oz/week     0 Standard drinks or equivalent per week      Comment: 1 drink per week avg, seldom     Drug use: Yes      Comment: marijuana- daily     Sexual activity: Yes     Partners: Female     Other Topics Concern     Caffeine Concern Yes     3 cups     Sleep Concern No     Special Diet No     trying to watch salt  "    Exercise Yes     walking     Seat Belt No     Parent/Sibling W/ Cabg, Mi Or Angioplasty Before 65f 55m? No     Social History Narrative       Family History:  Family History   Problem Relation Age of Onset     Genetic Disorder Mother      Bone plateover growth Agustin. shoulder surgeries     CANCER Mother      brain cancer     Alzheimer Disease Father        ROS:  The remainder of the complete ROS was negative unless noted in the HPI.    Exam:  /90  Pulse 82  Temp 99.7  F (37.6  C)  Resp 16  Ht 1.727 m (5' 8\")  Wt 92.9 kg (204 lb 12.9 oz)  SpO2 100%  BMI 31.14 kg/m2  General: intubated/sedated, arousable  Head: Normocephalic. No masses, lesions, tenderness or abnormalities. Pupils pinpoint bilaterally.  Multi-lumen CVC in place to right IJV.  Resp: clear to auscultation bilaterally  Cardiac: regular rate and rhythm  Abdomen: Soft, nontender, nondistended.  No HSM or masses, no rebound or guarding.  Extremities: Mottled extremities, moving extremities x4 to painful stimuli. No signs of joint swelling around left knee.    Labs:  Lab Results   Component Value Date    WBC 14.2 10/06/2017     Lab Results   Component Value Date    RBC 4.29 10/06/2017     Lab Results   Component Value Date    HGB 13.0 10/06/2017     Lab Results   Component Value Date    HCT 39.4 10/06/2017     No components found for: MCT  Lab Results   Component Value Date    MCV 92 10/06/2017     Lab Results   Component Value Date    MCH 30.3 10/06/2017     Lab Results   Component Value Date    MCHC 33.0 10/06/2017     Lab Results   Component Value Date    RDW 14.2 10/06/2017     Lab Results   Component Value Date     10/06/2017     Last Basic Metabolic Panel:  Lab Results   Component Value Date     10/06/2017      Lab Results   Component Value Date    POTASSIUM 3.4 10/06/2017     Lab Results   Component Value Date    CHLORIDE 105 10/06/2017     Lab Results   Component Value Date    IZABELLA 7.0 10/06/2017     Lab Results   Component " Value Date    CO2 24 10/06/2017     Lab Results   Component Value Date    BUN 18 10/06/2017     Lab Results   Component Value Date    CR 1.07 10/06/2017     Lab Results   Component Value Date     10/06/2017         Imaging:  CXR 10/6/17: 1. Cardiomegaly with pulmonary vascular congestion.  2. Unchanged widening of the mediastinum, which corresponds with the mediastinal hematoma seen on CT 10/6/2017.    CT Head:  IMPRESSION: Chronic changes. No evidence for intracranial hemorrhage  or any acute process.    CT Neck:  IMPRESSION: Degenerative changes. No evidence for fracture or any  posterior malalignment.    CT Chest/Abd/Pelvis:  1. Fluid collection with enhancing margin around the aortic root and  ascending thoracic aorta possibly postsurgical hematoma, dissection or  infected fluid collection. A discrete dissection flap is not  definitely identified.  2. Probable right pyelonephritis with decreased cortical enhancement  posterior mid right kidney. Kidneys are otherwise unremarkable.  Remaining abdominal and pelvic organs are within normal limits. No  diverticulitis or appendicitis. A few scattered liver cysts are  present of doubtful clinical significance.  3. No evidence of pneumonia or effusion. Changes of old granulomatous  disease are noted.  4. No evidence of intra-abdominal or pelvic abscess.     [Critical Result: Fluid collection about the aortic root and ascending  thoracic aorta possibly a dissection, postoperative hematoma or  infection.]    EKG:  Sinus tach, possible LA enlargement    ECHO: pending    Assessment/ Plan:    62 year old male with a history sepsis of unknown source (questionable MRSA) in April 2016 which led to a discovery of aortic stenosis with ascending aortic aneurysm/CAD.  He underwent a Bentall procedure (aortic valve replacement and aortic root replacement with a composite graft constructed using a 27 mm St. Isidro Trifecta (bovine pericardial) valve and a 30 mm Mckenzie Cardioroot  Valsalva tube graft), endoscopic vein harvest from the left lower extremity (reversed saphenous vein graft to OM) on 5/17/16 by Dr. Bowers.  Transferred from OSH after being found down, intubated for concern for sepsis with fluid collection around surgical repair.    - Evaluated with Dr. Kuo, who talked with Dr. Bowers    - Admit to CV surgery to ICU  - continue broad spectrum abx with I/D consult due to concern of infectious history  - ortho c/s for left knee as possible source  - ECHO performed in ED  - sedated/intubated, wean as tolerated per SICU  - will place OG with starting nutritional feeds once stabilized  - Will plan for further surgery next week.  Discussed further surgical procedures with significant other and children, whom understood the plan.    Awais Ma MD  Cardiothoracic Surgery Fellow  Pager: [phone]

## 2017-10-06 NOTE — CONSULTS
"Neurocritical Care Consult    Reason for consult: encephalopathy    HPI:   61 yo man with hx aortic valve replacement 2016 transferred to [facility] from [facility] with encephalopathy & fluid collection around aortic root and ascending aorta. Per family, patient had been feeling sick with \"flu-like symptoms\" on evening of 10/5/17. Around 8:30AM on 10/6/17, patients wife talked to him in bed and thought he seemed fine, except for the flu-like symptoms. Around 9:30 AM on 10/6 wife heard a \"thud\" and saw that patient fell out of bed. She called 911 & patient was taken to [facility] where he was intubated for airway protection. Initial CTH w/out acute pathology; CT chest/abd/pelv showed fluid collection with enhancing margin around aortic root & ascending aorta. Transferred to [facility] for further mgmt. Has been off sedation for several hours & continues to be encephalopathic, so Neurology consulted for further evaluation. Per family, patient has no history of stroke or seizures.    ROS: Negative except as stated above.    PMHx/PSHx:  Past Medical History:   Diagnosis Date     AI (aortic insufficiency)      Aortic root dilatation (H)      AR (aortic regurgitation)     severe     Cardiomyopathy (H)      CHF (congestive heart failure), NYHA class II (H)      COPD, mild (H)     spirometry 12/16     Heart murmur      Hyperlipidemia LDL goal <70 10/31/2010     Hypertension goal BP (blood pressure) < 140/90      Inguinal hernia      left lung nodule  1/29/2008     Major depressive disorder, single episode, moderate (H)      Psoriasis childhood     Tobacco use disorder      Past Surgical History:   Procedure Laterality Date      SHOULDER ARTHROSCOPY, DX  2001    Arthroscopy, Shoulder RT Dr OSIRIS Andersen     HC SHOULDER ARTHROSCOPY, DX  1/04    LT arthroscopy Dr OSIRIS Andersen     HERNIA REPAIR, UMBILICAL  1/08    incarcerated - dr puente     HERNIORRHAPHY INGUINAL  12/21/2012    HERNIORRHAPHY INGUINAL;  Right Inguinal Hernia " "Repair with mesh ;  Surgeon: Mello Rockwell MD;  Location: [facility] OR     REPLACE VALVE AORTIC N/A 5/17/2016    REPLACE VALVE AORTIC - Dr Bowers     ROTATOR CUFF REPAIR RT/LT  11/10    Rt arthroscopy - Dr OSIRIS Andersen     SURGICAL HISTORY OF -   5/16    AVR, severe AR, aortic root repair       Medications:    [START ON 10/7/2017] pantoprazole  40 mg Intravenous QAM AC       Allergies:  Lisinopril    Social Hx: Owns construction business. Per family, does not consume etoh or recreational drugs.    Family Hx: Non-contributory    Exam:  BP (!) 145/95  Pulse 82  Temp 100  F (37.8  C)  Resp 16  Ht 1.727 m (5' 8\")  Wt 92.9 kg (204 lb 12.9 oz)  SpO2 99%  BMI 31.14 kg/m2    Gen: NAD, intubated & lying in bed   Neck: head deviated to left, difficult to overcome increased tone in neck  CV: RRR  Lungs: mechanical ventilation  Abd: soft  Ext: no pedal edema  Neuro: eyes closed, not following commands, no speech production/intubated, not awakening to noxious stimuli (both proximal & distal). Pupils 3mm & constrict to light bilaterally. L gaze preference, able to overcome w/ oculocephalic maneuver. Intact corneal relfex, face appears symmetric, intact cough reflex. Limbs w/ flaccid tone throughout. Withdraws to noxious stimulation in all 4 extremities, though possibly less movement on R side. 2+ symmetric reflexes throughout, down-going toes bilaterally.    Pertinent Data:  CT head   \"IMPRESSION: Chronic changes. No evidence for intracranial hemorrhage  or any acute process.\"    Impression & Recommnedations:    # Encephalopathy - with focal signs on exam (L gaze preference, question of less movement on R side). Differential includes stroke, seizure/post-ictal state, CNS infection. Can also consider toxic/metabolic encephalopathy. Recommend w/u starting with imaging & EEG.    - MRI brain stroke protocol - assess for stroke or enhancing lesions.  - video EEG monitoring.  - may consider LP.  - we will continue to " follow.    Patient seen & discussed w/  Neuro-ICU Staff Dr. Johnson.    Kellie Burk   [facility] Neurology

## 2017-10-06 NOTE — ED NOTES
Levophed given to EMS for pressure control during transit if needed. MD spoke with EMS for dosing, EMS verbalizes understanding.

## 2017-10-06 NOTE — ED PROVIDER NOTES
History     Chief Complaint:  Fall & Altered Mental Status    HPI   HPI limited due to patient's mental status. HPI retrieved through EMS and patient's family.     Cricket Miguel is a 62 year old male, with a complex history including, sepsis, CHF, COPD, anemia, and s/p aortic valve replacement/root repair, who presents with his family to the ED for evaluation of fall and altered mental status. Per EMS, the patient was discovered by his wife this morning lying face down after hearing a loud thud. It is unsure if there was head trauma. Upon arrival of EMS, the patient was not responding for the first 20 minutes; the patient was only groaning. Afterwards, the patient became more aggravated. The patient was incontinent. The patient was given one bag of fluids during transfer. During transport, the patient s vitals were: 120 heart rate, 133 blood glucose, and 94% oxygen saturation on room air. Upon arrival, the patient s family report the patient had body aches last night with a minor fever of 99.4 at 8:00PM. The family note the patient seemed to improve this morning with no more fever. He is unable to provide any history. Family notes a history of sepsis without a source in [place] sometime last year. Records aren't available through care everywhere, but they note that presented similarly.     Allergies:  Lisinopril      Medications:    hydrochlorothiazide (HYDRODIURIL) 25 MG tablet   atorvastatin (LIPITOR) 40 MG tablet   amLODIPine (NORVASC) 5 MG tablet   furosemide (LASIX) 20 MG tablet   carvedilol (COREG) 12.5 MG tablet   aspirin  MG tablet      Past Medical History:    Aortic insufficiency  Aortic root dilation  Aortic regurgitation   Cardiomyopathy  CHF  COPD  Heart murmur  HLD  HTN  Inguinal hernia  Left lung nodule  Psoriasis  Depression  Anemia  Sepsis    Past Surgical History:    Bilateral shoulder arthroscopy  Umbilical hernia repair  Right inguinal herniorrhaphy    Aortic root repair  Right rotator  "cuff repair  Aortic valve replacement    Family History:    Brain cancer  Genetic disorder  Alzheimer s     Social History:  Smoking status: Former smoker, Quit date 4/1/2016  Alcohol use: Yes, x1/week  Presents to ED with family   Marital Status:   [5]     Review of Systems   Unable to perform ROS: Acuity of condition     Physical Exam     Patient Vitals for the past 24 hrs:   BP Temp Temp src Heart Rate Resp SpO2 Height   10/06/17 1204 - - - - - - 1.727 m (5' 8\")   10/06/17 1203 125/86 100.6  F (38.1  C) - 88 - - -   10/06/17 1202 - - - - - 94 % -   10/06/17 1201 - - - - - 97 % -   10/06/17 1145 (!) 136/100 101.1  F (38.4  C) - 100 - - -   10/06/17 1130 114/78 101.3  F (38.5  C) - 91 - - -   10/06/17 1120 - 101.5  F (38.6  C) - 98 - 98 % -   10/06/17 1115 106/74 - - 99 - 98 % -   10/06/17 1100 120/80 - - 100 - 100 % -   10/06/17 1016 131/74 100.7  F (38.2  C) Oral 111 18 94 % -      Physical Exam  Nursing note and vitals reviewed.  Constitutional: Altered. Moans with some words. Diffusely mottled.   HENT:   Mouth/Throat: dry mucous membranes.   Eyes: EOMI, nonicteric sclera, PERRL   Cardiovascular: tachcyardic, regular rhythm, systolic murmur, no rubs or gallops  Pulmonary/Chest: Effort normal and breath sounds normal. No respiratory distress. No wheezes. No rales.   Abdominal: Soft. Nontender, nondistended, no guarding or rigidity. BS present.   Musculoskeletal: Normal range of motion.   Neurological: Somnolent. Wakes up and answers questions, but sometimes incoherently. Follows some commands.   Skin: Diffusely mottled BLE up to abdomen and bilateral flank. Extremities cool. Pt diaphoretic.       Emergency Department Course     ECG (10:20:57):  Rate 114 bpm. AL interval 156. QRS duration 94. QT/QTc 330/454. P-R-T axes 58 -27 110. Sinus tachycardia (new). Possible left atrial enlargement. Nonspecific ST and T wave abnormality. Abnormal ECG. Interpreted at 1037 by Russell Huff, " MD.    Imaging:  Radiographic findings were communicated with the family who voiced understanding of the findings.    CT Chest/Abdomen/Pelvis w Contrast  IMPRESSION:  1. Fluid collection with enhancing margin around the aortic root and  ascending thoracic aorta possibly postsurgical hematoma, dissection or  infected fluid collection. A discrete dissection flap is not  definitely identified.  2. Probable right pyelonephritis with decreased cortical enhancement  posterior mid right kidney. Kidneys are otherwise unremarkable.  Remaining abdominal and pelvic organs are within normal limits. No  diverticulitis or appendicitis. A few scattered liver cysts are  present of doubtful clinical significance.  3. No evidence of pneumonia or effusion. Changes of old granulomatous  disease are noted.  4. No evidence of intra-abdominal or pelvic abscess.   As read by Radiology.    Head CT w/o Contrast  IMPRESSION: Chronic changes. No evidence for intracranial hemorrhage  or any acute process. As read by Radiology.    Cervical Spine CT w/o Contrast  IMPRESSION: Degenerative changes. No evidence for fracture or any  posterior malalignment. As read by Radiology.    XR Chest Port 1 View  IMPRESSION:  Right IJ line tip projects over the right atrium. Sternal  wires and prosthetic heart valve. Heart and pulmonary vasculature  likely mildly prominent.  Preliminary result as read.    Laboratory:  UA: Blood moderate, Mucous present   Troponin: 0.449 (HH)  CK total: 30  INR: 1.23(H)  PTT: 32  Lactic acid (1052): 3.3(H)  ISTAT: Ph 7.48(H), PO2 21(L), Bicarbonate 29(H), Lactic acid 3.4(H)  CBC: WBC 17.1(H) o/w WNL (HGB 15.0, )   CMP:  Potassium 3.2(L), Glucose 130(H), GFR estimate 54(L), Bilirubin total 1.5(H), Albumin 2.8(L), Protein total 6.7(L), Creatinine 1.33(H) o/w WNL    Blood cultures: In process    Procedures:   Central Line Placement     Procedure:  Central Line Placement with Ultrasound Guidance.    Indications: Vascular  access and Sepsis (monitoring of mixed venous oxygen saturations)    Consent:   unable to obtain due to emergency conditions    Timeout:  Universal protocol was followed. TIME OUT conducted just prior to starting procedure confirmed patient identity, site/side, procedure, patient position, and availability of correct equipment and implants.?  Yes    Procedure note:  Right Internal Jugular approach was selected and the right anterior neck was prepped, cleansed and draped in a sterile fashion.  Mask, gown and gloves were used per sterile protocol.  Patient was then placed into Trendelenburg position and lidocaine was used for local anesthesia.  Vascular probe with ultrasound was used in a sterile fashion for guidence.  Introducer needle was then used to gain access to the central venous circulation.  Using Seldinger technique the  Triple lumen catheter was placed.  Catheter port(s) were aspirated and flushed.  Central line was sutured in place and sterile dressing applied.   Appropriate placement was confirmed by x-ray and no pneumothorax was visualized.    Patient Status:  Patient tolerated the procedure well.  There were no complications.      Interventions:  1028: NS 1L Bolus IV  1052: NS 2L Bolus IV  1123: Zosyn 4.5g IV  1128: Vancomycin 2,000mg IV   1145: Versed 1mg IV  1150: Sublimaze 50mcg IV    Emergency Department Course:  Patient arrived by EMS.    1011: I performed an exam of the patient and obtained history, as documented above.    1016: Blood drawn.    The patient was sent for an abdomen/chest/pelvis CT, head CT, and cervical spine CT while in the emergency department, findings above.    1104: I spoke with radiology concerning fluid collection around aorta.    1112: I spoke with Dr. Phelps of ER @ [facility]    1114: I spoke with multiple providers on conference call at the [facility]. Dr. Kuo was available to accept pt for transfer.     1137: I placed a central line as noted above.     1155: A portable chest  x-ray was performed on the patient, findings above.     1206: I spoke with Dr. Kuo of cardiothoracic surgery at the [facility].       Findings and plan explained to the family. Patient will be transferred to [facility] via EMS. Discussed the case with Dr. Kuo, cardiothoracic surgery, who will admit the patient to a monitored bed for further monitoring, evaluation, and treatment.     Impression & Plan      CMS Diagnoses:  The patient has signs of Severe Sepsis as evidenced by:    1. 2 SIRS criteria, AND  2. Suspected infection, AND   3. Organ dysfunction: Lactic Acid >2    Time severe sepsis diagnosis confirmed = 1052 as this was the time when Lactate resulted, and the level was >2    3 Hour Severe Sepsis Bundle Completion:  1. Initial Lactic Acid Result:   Recent Labs   Lab Test  10/06/17   1023  10/06/17   1020  05/18/16   1148   LACT  3.4*  3.3*  1.7     2. Blood Cultures before Antibiotics: Yes  3. Broad Spectrum Antibiotics Administered: Yes     Anti-infectives     Vanc/Zosyn          4. 3000 ml of IV fluids.     the patient was transferred out of the ED prior to the 6 hour margie.     Medical Decision Making:  Patient presents with altered mental status and fall. History obtained from family given patient not really communicative. They report that he was feeling ill yesterday with body aches and this morning fell. On arrival, patient is altered and is mottled all the way up into his abdomen and flank. His extremities are cool, and he is tachycardic. He is also noted to be febrile. This is concerning for sepsis. Laboratory workup supports this with leukocytosis in addition to lactic acidosis. Patient is given 3 liters of fluid for improvement of this per sepsis protocol. Given the patient's history of sepsis without a source, I elected to CT his chest/abdomen/pelvis as part of the workup, and this shows a fluid collection around his aorta concerning for dissection versus abscess versus other process. Upon seeing  this, I immediately called an aortic emergency and spoke with multiple providers up at the . They had multiple surgeons in surgery, but ultimately, Dr. Kuo was available to accept the patient. I started patient on Vancomycin and Zosyn for treatment of his severe sepsis. I suspect that patient's graft got infected and possibly cause an abscess, or possibly even eroded through the wall of the aorta. Patient's pressures were getting soft while he was here down to a systolic of the 100's when he is chronically hypotensive. Therefore, I did place a central line, and I sent EMS with a bag of Levophed just in case. Discussed with family about likely diagnosis and need for emergent transfer. They voiced understanding. Patient has been stabilized at the time of transfer. All family's questions were answered, and they're in agreement with the plan.      Critical Care Note:    Systems at risk for life threatening failure: Respiratory, Cardiovascular and Neurologic  Associated problems: Acidosis, Dehydration, Hypoxia, Infection, Metabolic changes, Sepsis, Trauma  Critical Interventions: IV Fluids,  Oxygen, Vasopressors available  Total Time: 60 min (excluding separately billed procedures)   Includes: Bedside management,         Cardiac monitor interpretation, Case discussion related to critical care,         Documentation, CXR Interpretation, Multiple re-evaluations        Record review, Test review   Excludes:Central Line, EKG interp            Diagnosis:    ICD-10-CM    1. Severe sepsis (H) A41.9     R65.20    2. Fluid collection at surgical site, initial encounter T88.8XXA     around ascending aorta - question dissection vs abscess   3. JOSE  4. ? Pyelonephritis on CT  5. Altered mental status  6. Elevated Troponin      Disposition: Patient transferred to [facility] ED to be admitted by Dr. Kuo, cardiothoracic surgery     Parvin Saenz  10/6/2017   [facility] EMERGENCY DEPARTMENT    I, Parvin Saenz am  serving as a scribe at 10:11 AM on 10/6/2017 to document services personally performed by Russell Huff MD based on my observations and the provider's statements to me.          Russell Huff MD  10/06/17 [id number]

## 2017-10-06 NOTE — PROGRESS NOTES
[facility] ID SERVICE: COURTESY NOTE   Cricket Miguel : 1955 Sex: male:   Medical record number [id number] Attending Physician: No att. providers found  Date of Service: 2017    RECOMMENDATIONS:   1. Agree with Vancomycin; target levels 15-20 ug/mL; pharmacy to assist in dosing and tracking levels  2. Start Gentamicin 3mg/kg IV daily; pharmacy to assist in dosing and tracking levels  3. D/C Zosyn  4. Daily blood cultures; check procalcitonin  5. MRSA nasal screen (PCR)  6. Monitor renal function closely given use of vancomycin and gentamicin  7. UA/UC given CT findings concerning for right pyelonephritis  8. Agree with Orthopedics consultation    DISCUSSION:   62 year old male with a history of severe severe aortic valve insufficiency and a 5 cm aortic root aneurysm and severe single vessel coronary artery disease involving an obtuse marginal branch. He underwent a Bentall procedure (aortic valve replacement and aortic root replacement with a composite graft constructed using a 27 mm St. Isidro Trifecta (bovine pericardial) valve and a 30 mm Mckenzie Cardioroot Valsalva tube graft), endoscopic vein harvest from the left lower extremity (reversed saphenous vein graft to OM) on 16.  He was discharged from the hospital with no complications nor signs of infection.  He has had no issues since then,      He presented to the [facility] ED for evaluation of a fall and altered mental status. Per EMS, the patient was discovered by wife this morning after lying face down after she heard a loud thud. Per his wife, he had been complaining of joint pain, minor fever 99.4 and not feeling well for the past 24 hours.  He denied any sick contacts. He did have his left knee tapped followed by a cortisol injection last month at an orthopaedic clinic, which they claim did not show any signs of infection.     Per the EMS records, the patient was not responding for the first 20 minutes; the patient was only  groaning. Afterwards, the patient became more aggravated and became incontinent. The patient was given one bag of fluids during transfer. During transport, the patient s vitals were: 120 heart rate, 133 blood glucose, and 94% oxygen saturation on room air. He was intubated by EMS due to his confusion and respiratory distress.  He was also given a dose of vancomycin and Zosyn en route due to leukocytosis.  Blood cultures were drawn.     A head/neck/chest/abd/pelv CT's were obtained:    IMPRESSION:  1. Fluid collection with enhancing margin around the aortic root and  ascending thoracic aorta possibly postsurgical hematoma, dissection or  infected fluid collection. A discrete dissection flap is not  definitely identified.  2. Probable right pyelonephritis with decreased cortical enhancement  posterior mid right kidney. Kidneys are otherwise unremarkable.  Remaining abdominal and pelvic organs are within normal limits. No  diverticulitis or appendicitis. A few scattered liver cysts are  present of doubtful clinical significance.  3. No evidence of pneumonia or effusion. Changes of old granulomatous  disease are noted.  4. No evidence of intra-abdominal or pelvic abscess.    He was transferred from [facility] for further evaluation.  CT surgery was consulted for the concerns for infectious source. Neurology has been consulted for the AMS. Orthopedics was consulted for evaluation of his left knee as a possible source of infection.     Given the findings of a fluid collection around the aortic root and ascending thoracic aorta in the setting of a prosthetic heart valve, concern for infective endocarditis is high. Late onset prosthetic valve endocarditis typically involves Staphylococcus aureus, Staphylococcus epidermidis, Viridans streptococcus, and Enterococcal spp. Appropriate empiric treatment for prosthetic valve endocarditis involves vancomycin + gentamicin; rifampin may also be used when the organism is  identified and proven susceptible and when the bacterial burden has been reduced (low threshold for resistance development with high bacterial load). Areas of concern for the source of IE include the left knee (especially given his recent steroid injection) and urinary/renal due to imaging suggestive of possible right pyelonephritis.   ID will continue to follow. Formal consultation to follow.     PINO Pereira M.D.  [facility] ID Service Staff  [phone]

## 2017-10-06 NOTE — ED NOTES
Pt arrives via EMS for confusion, fever, fall. Pt has a hx of sepsis per wife, pt has had flu sx since yesterday. Today wife found pt down. Pt more confused and skin mottled, + fever. Pt confused, not redirectable. GCS 10, wife and daughter at bedside.     Partial Trauma called at 1017, see paper charting.

## 2017-10-06 NOTE — IP AVS SNAPSHOT
"    [address] [facility]: [phone]                                              INTERAGENCY TRANSFER FORM - PHYSICIAN ORDERS   10/6/2017                    Hospital Admission Date: 10/6/2017  ASHTYN STROUD   : 1955  Sex: Male        Attending Provider: Ramesh Kuo MD     Allergies:  Lisinopril    Infection:  None   Service:  ICU    Ht:  1.727 m (5' 8\")   Wt:  88.3 kg (194 lb 10.7 oz)   Admission Wt:  92.9 kg (204 lb 12.9 oz)    BMI:  29.6 kg/m 2   BSA:  2.06 m 2            Patient PCP Information     Provider PCP Type    Dipak Gordillo MD General      ED Clinical Impression     Diagnosis Description Comment Added By Time Added    Dissection of aorta, unspecified portion of aorta (H) [I71.00] Dissection of aorta, unspecified portion of aorta (H) [I71.00]  Mario Alberto Phelps MD 10/6/2017  1:56 PM    Sepsis due to other etiology (H) [A41.89] Sepsis due to other etiology (H) [A41.89]  Mario Alberto Phelps MD 10/6/2017  1:56 PM    NSTEMI (non-ST elevated myocardial infarction) (H) [I21.4] NSTEMI (non-ST elevated myocardial infarction) (H) [I21.4]  Mario Alberto Phelps MD 10/6/2017  1:56 PM    Hypotension, unspecified hypotension type [I95.9] Hypotension, unspecified hypotension type [I95.9]  Mario Alberto Phelps MD 10/6/2017  1:57 PM      Hospital Problems as of 2017              Priority Class Noted POA    Endocarditis Medium  10/6/2017 Yes      Non-Hospital Problems as of 2017              Priority Class Noted    Tobacco use disorder Medium  2006    Hypertension goal BP (blood pressure) < 140/90 High  2008    Disease of lung Medium  2008    Major depressive disorder, single episode, moderate (HCC) Medium  Unknown    Hyperlipidemia LDL goal <70 Medium  10/31/2010    Advance Care Planning Low  2012    Psoriasis Low  Unknown    COPD, mild (H) Medium  Unknown    CHF (congestive heart failure), NYHA class II (H) Medium  Unknown    AR (aortic regurgitation) Medium  Unknown "    AI (aortic insufficiency) Medium  Unknown    Aortic root dilatation (H) Medium  Unknown    Health Care Home Medium  5/2/2016    Status post coronary angiogram Medium  5/6/2016    Cardiomyopathy (H) Medium  Unknown    S/P AVR (aortic valve replacement) Medium  5/23/2016    H/O aortic root repair Medium  5/23/2016    Anemia Medium  5/23/2016    Encephalopathy Medium  10/17/2017      Code Status History     Date Active Date Inactive Code Status Order ID Comments User Context    5/17/2016  2:14 PM 5/22/2016  2:45 PM Full Code [id number]  Rosendo Gama MD Inpatient    4/27/2016  4:32 PM 5/17/2016  2:14 PM Full Code [id number]  Elma Silva MD Outpatient    4/25/2016  4:13 PM 4/27/2016  4:32 PM Full Code [id number]  Elma Silva MD Inpatient         Medication Review      START taking        Dose / Directions Comments    acetaminophen 32 mg/mL solution   Commonly known as:  TYLENOL   Used for:  Tracheostomy in place (H)        Dose:  650 mg   20.3 mLs (650 mg) by Oral or Feeding Tube route every 6 hours as needed for fever or mild pain   Quantity:  400 mL   Refills:  0        albuterol 108 (90 BASE) MCG/ACT Inhaler   Commonly known as:  PROAIR HFA/PROVENTIL HFA/VENTOLIN HFA   Used for:  Tracheostomy in place (H)        Dose:  6 puff   Inhale 6 puffs into the lungs every 4 hours as needed for shortness of breath / dyspnea   Refills:  0        aspirin 81 MG chewable tablet   Used for:  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)   Replaces:  aspirin  MG EC tablet        Dose:  81 mg   1 tablet (81 mg) by Oral or Feeding Tube route daily   Quantity:  36 tablet   Refills:  0        D5W 50 mL with nafcillin 2 g infusion   Used for:  H/O aortic root repair        Dose:  2 g   Inject 2 g into the vein every 4 hours   Refills:  0    D5W 50 mL with nafcillin 2 g infusion       famotidine 40 MG/5ML suspension   Commonly known as:  PEPCID   Used for:  Tracheostomy in place (H)        Dose:  20 mg    Take 2.5 mLs (20 mg) by mouth 2 times daily   Quantity:  75 mL   Refills:  0        fiber modular packet   Used for:  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)        Dose:  1 packet   1 packet by Per Feeding Tube route 3 times daily   Refills:  0        heparin sodium PF 5000 UNIT/0.5ML injection   Used for:  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)        Dose:  5000 Units   Inject 0.5 mLs (5,000 Units) Subcutaneous every 8 hours   Refills:  0        labetalol 200 MG tablet   Commonly known as:  NORMODYNE   Used for:  Hypertension goal BP (blood pressure) < 140/90        Dose:  200 mg   Take 1 tablet (200 mg) by mouth every 12 hours   Quantity:  60 tablet   Refills:  0        levofloxacin 750 MG/150ML infusion   Commonly known as:  LEVAQUIN   Indication:  Bacteria in the Blood   Used for:  H/O aortic root repair        Dose:  750 mg   Inject 150 mLs (750 mg) into the vein every 24 hours   Quantity:  1000 mL   Refills:  0        multivitamins with minerals Liqd liquid   Used for:  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)        Dose:  15 mL   15 mLs by Per Feeding Tube route daily   Refills:  0        oxyCODONE IR 5 MG tablet   Commonly known as:  ROXICODONE   Used for:  Tracheostomy in place (H)        Dose:  5-10 mg   Take 1-2 tablets (5-10 mg) by mouth every 4 hours as needed for moderate to severe pain   Quantity:  18 tablet   Refills:  0        polyethylene glycol Packet   Commonly known as:  MIRALAX/GLYCOLAX   Used for:  Drug-induced constipation        Dose:  17 g   Take 17 g by mouth daily as needed for constipation   Quantity:  7 packet   Refills:  0        protein modular   Used for:  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)        Dose:  1 packet   1 packet by Per Feeding Tube route daily   Refills:  0        QUEtiapine 50 MG tablet   Commonly known as:  SEROquel   Used for:  Cerebral infarction due to embolism of  precerebral artery (H)        Dose:  150 mg   3 tablets (150 mg) by Oral or Feeding Tube route 3 times daily   Quantity:  120 tablet   Refills:  0        rifampin 25 mg/mL Susp   Commonly known as:  REIFADEN   Indication:  Abscess   Used for:  H/O aortic root repair        Dose:  300 mg   12 mLs (300 mg) by Oral or Feeding Tube route 2 times daily   Refills:  0          CONTINUE these medications which have NOT CHANGED        Dose / Directions Comments    amLODIPine 5 MG tablet   Commonly known as:  NORVASC   Used for:  S/P AVR, Essential hypertension        Dose:  5 mg   Take 1 tablet (5 mg) by mouth 2 times daily   Quantity:  180 tablet   Refills:  3        atorvastatin 40 MG tablet   Commonly known as:  LIPITOR   Used for:  Vascular calcification        Dose:  40 mg   Take 1 tablet (40 mg) by mouth daily   Quantity:  90 tablet   Refills:  1        hydrochlorothiazide 25 MG tablet   Commonly known as:  HYDRODIURIL   Used for:  HTN (hypertension)        Dose:  25 mg   Take 1 tablet (25 mg) by mouth daily   Quantity:  90 tablet   Refills:  1          STOP taking     aspirin  MG EC tablet   Replaced by:  aspirin 81 MG chewable tablet           carvedilol 12.5 MG tablet   Commonly known as:  COREG           furosemide 20 MG tablet   Commonly known as:  LASIX                   After Care     Activity - Up with nursing assistance           Adult Formula Bolus Feeding       Specify: TwoCal HN @ 50 mL/hr + 1 packet ProSource daily to provide total 2440 kcal (85% MREE or 33 kcal/kg) and 112 g PRO (1.5 g/kg)       Advance Diet as Tolerated       Follow this diet upon discharge: Orders Placed This Encounter      Adult Formula Drip Feeding: Continuous TwoCal HN; Gastrostomy; Goal Rate: 50; mL/hr; Medication - Tube Feeding Flush Frequency: At least 15-30 mL water before and after medication administration and with tube clogging; Amout to Send (Nutrition use...      NPO per Anesthesia Guidelines for Procedure/Surgery  Except for: Meds       Bladder scan       X 2 for post void residual       Daily weights       Call Provider for weight gain of more than 2 pounds per day or 5 pounds per week.       General info for SNF       Length of Stay Estimate: Long Term Care  Condition at Discharge: Stable  Level of care:skilled   Rehabilitation Potential: Fair  Admission H&P remains valid and up-to-date: Yes  Recent Chemotherapy: N/A  Use Nursing Home Standing Orders: Yes       Intake and output       Every shift       Mantoux instructions       Give two-step Mantoux (PPD) Per Facility Policy Yes             Procedures     Oxygen - Trach dome       With humidity to keep O2 sats % >  92             Referrals     Occupational Therapy Adult Consult       Evaluate and treat as clinically indicated.    Reason:  Deconditioning, stroke       Physical Therapy Adult Consult       Evaluate and treat as clinically indicated.    Reason:  Deconditioning, stroke       Speech Language Path Adult Consult       Evaluate and treat as clinically indicated.    Reason:  Stroke, trach             Follow-Up Appointment Instructions     Future Labs/Procedures    Follow Up ([facility])     Comments:    Follow up with CV surgery , at (location with clinic name or city) [facility], within 3 months  to evaluate treatment change. No follow up labs or test are needed. Follow up sooner if needed.    Appointments on [place] and/or [facility] (with [facility] or [facility] provider or service). Call [phone] if you haven't heard regarding these appointments within 7 days of discharge.      Follow-Up Appointment Instructions     Follow Up ([facility])       Follow up with CV surgery , at (location with clinic name or city) [facility], within 3 months  to evaluate treatment change. No follow up labs or test are needed. Follow up sooner if needed.    Appointments on [place] and/or [facility] (with [facility] or [facility] provider or service). Call [phone] if you haven't heard regarding  these appointments within 7 days of discharge.             Statement of Approval     Ordered          11/04/17 1145  I have reviewed and agree with all the recommendations and orders detailed in this document.  EFFECTIVE NOW     Approved and electronically signed by:  Walter Bullock MD

## 2017-10-06 NOTE — IP AVS SNAPSHOT
MRN:[id number]                      After Visit Summary   10/6/2017    Cricket Miguel    MRN: [id number]           Thank you!     Thank you for choosing [facility] for your care. Our goal is always to provide you with excellent care. Hearing back from our patients is one way we can continue to improve our services. Please take a few minutes to complete the written survey that you may receive in the mail after you visit with us. Thank you!        Patient Information     Date Of Birth          1955        Designated Caregiver       Most Recent Value    Caregiver    Will someone help with your care after discharge? yes    Name of designated caregiver Meghna    Phone number of caregiver Same as pt    Caregiver address Same as pt      About your hospital stay     You were admitted on:  October 6, 2017 You last received care in the:  [address] [facility]    You were discharged on:  November 4, 2017       Who to Call     For medical emergencies, please call 911.  For non-urgent questions about your medical care, please call your primary care provider or clinic, [phone]  For questions related to your surgery, please call your surgery clinic        Attending Provider     Provider Specialty    Mario Alberto Phelps MD Emergency Medicine    [facility]       Primary Care Provider Office Phone # Fax #    Dipak Gordillo [phone]      After Care Instructions     Activity - Up with nursing assistance           Adult Formula Bolus Feeding       Specify: TwoCal HN @ 50 mL/hr + 1 packet ProSource daily to provide total 2440 kcal (85% MREE or 33 kcal/kg) and 112 g PRO (1.5 g/kg)            Advance Diet as Tolerated       Follow this diet upon discharge: Orders Placed This Encounter      Adult Formula Drip Feeding: Continuous TwoCal HN; Gastrostomy; Goal Rate: 50; mL/hr; Medication - Tube Feeding Flush Frequency: At least 15-30 mL water before and after medication  administration and with tube clogging; Amout to Send (Nutrition use...      NPO per Anesthesia Guidelines for Procedure/Surgery Except for: Meds            Bladder scan       X 2 for post void residual            Daily weights       Call Provider for weight gain of more than 2 pounds per day or 5 pounds per week.            General info for SNF       Length of Stay Estimate: Long Term Care  Condition at Discharge: Stable  Level of care:skilled   Rehabilitation Potential: Fair  Admission H&P remains valid and up-to-date: Yes  Recent Chemotherapy: N/A  Use Nursing Home Standing Orders: Yes            Intake and output       Every shift            Mantoux instructions       Give two-step Mantoux (PPD) Per Facility Policy Yes            Oxygen - Trach dome       With humidity to keep O2 sats % >  92                  Follow-up Appointments     Follow Up ([facility])       Follow up with CV surgery , at (location with clinic name or city) [facility], within 3 months  to evaluate treatment change. No follow up labs or test are needed. Follow up sooner if needed.    Appointments on [place] and/or [facility] (with [facility] or [facility] provider or service). Call [phone] if you haven't heard regarding these appointments within 7 days of discharge.                  Additional Services     Occupational Therapy Adult Consult       Evaluate and treat as clinically indicated.    Reason:  Deconditioning, stroke            Physical Therapy Adult Consult       Evaluate and treat as clinically indicated.    Reason:  Deconditioning, stroke            Speech Language Path Adult Consult       Evaluate and treat as clinically indicated.    Reason:  Stroke, trach                  Pending Results     Date and Time Order Name Status Description    10/8/2017 0857 Fungus Culture, non-blood Preliminary             Statement of Approval     Ordered          11/04/17 1145  I have reviewed and agree with all the recommendations and orders detailed in  "this document.  EFFECTIVE NOW     Approved and electronically signed by:  Walter Bullock MD             Admission Information     Date & Time Provider Department Dept. Phone    10/6/2017 Ramesh Kuo MD [address] [facility] [phone]      Your Vitals Were     Blood Pressure Pulse Temperature Respirations Height Weight    110/77 82 97.9  F (36.6  C) (Axillary) 15 1.727 m (5' 8\") 88.3 kg (194 lb 10.7 oz)    Pulse Oximetry BMI (Body Mass Index)                99% 29.6 kg/m2          MyChart Information     [facility] gives you secure access to your electronic health record. If you see a primary care provider, you can also send messages to your care team and make appointments. If you have questions, please call your primary care clinic.  If you do not have a primary care provider, please call [phone] and they will assist you.        Care EveryWhere ID     This is your Care EveryWhere ID. This could be used by other organizations to access your [place] medical records  [id number]        Equal Access to Services     DANIEL BILLS : Hadii jenise kisero Sonico, waaxda luqadaha, qaybta kaalmamalik stinson, freddie rodríguez . So [facility] [phone].    ATENCIÓN: Si habla español, tiene a mendiola disposición servicios gratuitos de asistencia lingüística. Llame al [phone].    We comply with applicable federal civil rights laws and [place] laws. We do not discriminate on the basis of race, color, national origin, age, disability, sex, sexual orientation, or gender identity.               Review of your medicines      START taking        Dose / Directions    acetaminophen 32 mg/mL solution   Commonly known as:  TYLENOL   Used for:  Tracheostomy in place (H)        Dose:  650 mg   20.3 mLs (650 mg) by Oral or Feeding Tube route every 6 hours as needed for fever or mild pain   Quantity:  400 mL   Refills:  0       albuterol 108 (90 BASE) MCG/ACT Inhaler   Commonly known as:  " PROAIR HFA/PROVENTIL HFA/VENTOLIN HFA   Used for:  Tracheostomy in place (H)        Dose:  6 puff   Inhale 6 puffs into the lungs every 4 hours as needed for shortness of breath / dyspnea   Refills:  0       aspirin 81 MG chewable tablet   Used for:  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)   Replaces:  aspirin  MG EC tablet        Dose:  81 mg   1 tablet (81 mg) by Oral or Feeding Tube route daily   Quantity:  36 tablet   Refills:  0       D5W 50 mL with nafcillin 2 g infusion   Used for:  H/O aortic root repair        Dose:  2 g   Inject 2 g into the vein every 4 hours   Refills:  0       famotidine 40 MG/5ML suspension   Commonly known as:  PEPCID   Used for:  Tracheostomy in place (H)        Dose:  20 mg   Take 2.5 mLs (20 mg) by mouth 2 times daily   Quantity:  75 mL   Refills:  0       fiber modular packet   Used for:  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)        Dose:  1 packet   1 packet by Per Feeding Tube route 3 times daily   Refills:  0       heparin sodium PF 5000 UNIT/0.5ML injection   Used for:  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)        Dose:  5000 Units   Inject 0.5 mLs (5,000 Units) Subcutaneous every 8 hours   Refills:  0       labetalol 200 MG tablet   Commonly known as:  NORMODYNE   Used for:  Hypertension goal BP (blood pressure) < 140/90        Dose:  200 mg   Take 1 tablet (200 mg) by mouth every 12 hours   Quantity:  60 tablet   Refills:  0       levofloxacin 750 MG/150ML infusion   Commonly known as:  LEVAQUIN   Indication:  Bacteria in the Blood   Used for:  H/O aortic root repair        Dose:  750 mg   Inject 150 mLs (750 mg) into the vein every 24 hours   Quantity:  1000 mL   Refills:  0       multivitamins with minerals Liqd liquid   Used for:  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)        Dose:  15 mL   15 mLs by Per Feeding Tube route daily   Refills:  0       oxyCODONE IR 5 MG tablet    Commonly known as:  ROXICODONE   Used for:  Tracheostomy in place (H)        Dose:  5-10 mg   Take 1-2 tablets (5-10 mg) by mouth every 4 hours as needed for moderate to severe pain   Quantity:  18 tablet   Refills:  0       polyethylene glycol Packet   Commonly known as:  MIRALAX/GLYCOLAX   Used for:  Drug-induced constipation        Dose:  17 g   Take 17 g by mouth daily as needed for constipation   Quantity:  7 packet   Refills:  0       protein modular   Used for:  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)        Dose:  1 packet   1 packet by Per Feeding Tube route daily   Refills:  0       QUEtiapine 50 MG tablet   Commonly known as:  SEROquel   Used for:  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)        Dose:  150 mg   3 tablets (150 mg) by Oral or Feeding Tube route 3 times daily   Quantity:  120 tablet   Refills:  0       rifampin 25 mg/mL Susp   Commonly known as:  REIFADEN   Indication:  Abscess   Used for:  H/O aortic root repair        Dose:  300 mg   12 mLs (300 mg) by Oral or Feeding Tube route 2 times daily   Refills:  0         CONTINUE these medicines which have NOT CHANGED        Dose / Directions    amLODIPine 5 MG tablet   Commonly known as:  NORVASC   Used for:  S/P AVR, Essential hypertension        Dose:  5 mg   Take 1 tablet (5 mg) by mouth 2 times daily   Quantity:  180 tablet   Refills:  3       atorvastatin 40 MG tablet   Commonly known as:  LIPITOR   Used for:  Vascular calcification        Dose:  40 mg   Take 1 tablet (40 mg) by mouth daily   Quantity:  90 tablet   Refills:  1       hydrochlorothiazide 25 MG tablet   Commonly known as:  HYDRODIURIL   Used for:  HTN (hypertension)        Dose:  25 mg   Take 1 tablet (25 mg) by mouth daily   Quantity:  90 tablet   Refills:  1         STOP taking     aspirin  MG EC tablet   Replaced by:  aspirin 81 MG chewable tablet           carvedilol 12.5 MG tablet   Commonly known as:  COREG            furosemide 20 MG tablet   Commonly known as:  LASIX                Where to get your medicines      Some of these will need a paper prescription and others can be bought over the counter. Ask your nurse if you have questions.     You don't need a prescription for these medications     acetaminophen 32 mg/mL solution    albuterol 108 (90 BASE) MCG/ACT Inhaler    aspirin 81 MG chewable tablet    D5W 50 mL with nafcillin 2 g infusion    famotidine 40 MG/5ML suspension    fiber modular packet    heparin sodium PF 5000 UNIT/0.5ML injection    labetalol 200 MG tablet    levofloxacin 750 MG/150ML infusion    multivitamins with minerals Liqd liquid    polyethylene glycol Packet    protein modular    QUEtiapine 50 MG tablet    rifampin 25 mg/mL Susp         Information about where to get these medications is not yet available     ! Ask your nurse or doctor about these medications     oxyCODONE IR 5 MG tablet               ANTIBIOTIC INSTRUCTION     You've Been Prescribed an Antibiotic - Now What?  Your healthcare team thinks that you or your loved one might have an infection. Some infections can be treated with antibiotics, which are powerful, life-saving drugs. Like all medications, antibiotics have side effects and should only be used when necessary. There are some important things you should know about your antibiotic treatment.      Your healthcare team may run tests before you start taking an antibiotic.    Your team may take samples (e.g., from your blood, urine or other areas) to run tests to look for bacteria. These test can be important to determine if you need an antibiotic at all and, if you do, which antibiotic will work best.      Within a few days, your healthcare team might change or even stop your antibiotic.    Your team may start you on an antibiotic while they are working to find out what is making you sick.    Your team might change your antibiotic because test results show that a different antibiotic  would be better to treat your infection.    In some cases, once your team has more information, they learn that you do not need an antibiotic at all. They may find out that you don't have an infection, or that the antibiotic you're taking won't work against your infection. For example, an infection caused by a virus can't be treated with antibiotics. Staying on an antibiotic when you don't need it is more likely to be harmful than helpful.      You may experience side effects from your antibiotic.    Like all medications, antibiotics have side effects. Some of these can be serious.    Let you healthcare team know if you have any known allergies when you are admitted to the hospital.    One significant side effect of nearly all antibiotics is the risk of severe and sometimes deadly diarrhea caused by Clostridium difficile (C. Difficile). This occurs when a person takes antibiotics because some good germs are destroyed. Antibiotic use allows C. diificile to take over, putting patients at high risk for this serious infection.    As a patient or caregiver, it is important to understand your or your loved one's antibiotic treatment. It is especially important for caregivers to speak up when patients can't speak for themselves. Here are some important questions to ask your healthcare team.    What infection is this antibiotic treating and how do you know I have that infection?    What side effects might occur from this antibiotic?    How long will I need to take this antibiotic?    Is it safe to take this antibiotic with other medications or supplements (e.g., vitamins) that I am taking?     Are there any special directions I need to know about taking this antibiotic? For example, should I take it with food?    How will I be monitored to know whether my infection is responding to the antibiotic?    What tests may help to make sure the right antibiotic is prescribed for me?      Information provided  by:  www.cdc.gov/getsmart  U.S. Department of Health and Human Services  Centers for disease Control and Prevention  National Center for Emerging and Zoonotic Infectious Diseases  Division of Healthcare Quality Promotion         Protect others around you: Learn how to safely use, store and throw away your medicines at www.disposemymeds.org.             Medication List: This is a list of all your medications and when to take them. Check marks below indicate your daily home schedule. Keep this list as a reference.      Medications           Morning Afternoon Evening Bedtime As Needed    acetaminophen 32 mg/mL solution   Commonly known as:  TYLENOL   20.3 mLs (650 mg) by Oral or Feeding Tube route every 6 hours as needed for fever or mild pain   Last time this was given:  975 mg on 11/4/2017  8:05 AM                                albuterol 108 (90 BASE) MCG/ACT Inhaler   Commonly known as:  PROAIR HFA/PROVENTIL HFA/VENTOLIN HFA   Inhale 6 puffs into the lungs every 4 hours as needed for shortness of breath / dyspnea                                amLODIPine 5 MG tablet   Commonly known as:  NORVASC   Take 1 tablet (5 mg) by mouth 2 times daily   Last time this was given:  5 mg on 11/4/2017  8:06 AM                                aspirin 81 MG chewable tablet   1 tablet (81 mg) by Oral or Feeding Tube route daily   Last time this was given:  81 mg on 11/4/2017  8:05 AM                                atorvastatin 40 MG tablet   Commonly known as:  LIPITOR   Take 1 tablet (40 mg) by mouth daily                                D5W 50 mL with nafcillin 2 g infusion   Inject 2 g into the vein every 4 hours   Last time this was given:  11/4/2017 12:01 PM                                famotidine 40 MG/5ML suspension   Commonly known as:  PEPCID   Take 2.5 mLs (20 mg) by mouth 2 times daily   Last time this was given:  20 mg on 11/4/2017  8:08 AM                                fiber modular packet   1 packet by Per Feeding  Tube route 3 times daily   Last time this was given:  1 packet on 11/4/2017  8:09 AM                                heparin sodium PF 5000 UNIT/0.5ML injection   Inject 0.5 mLs (5,000 Units) Subcutaneous every 8 hours   Last time this was given:  5,000 Units on 11/4/2017  8:06 AM                                hydrochlorothiazide 25 MG tablet   Commonly known as:  HYDRODIURIL   Take 1 tablet (25 mg) by mouth daily   Last time this was given:  25 mg on 11/4/2017  8:06 AM                                labetalol 200 MG tablet   Commonly known as:  NORMODYNE   Take 1 tablet (200 mg) by mouth every 12 hours   Last time this was given:  200 mg on 11/4/2017  8:06 AM                                levofloxacin 750 MG/150ML infusion   Commonly known as:  LEVAQUIN   Inject 150 mLs (750 mg) into the vein every 24 hours   Last time this was given:  750 mg on 11/4/2017 10:47 AM                                multivitamins with minerals Liqd liquid   15 mLs by Per Feeding Tube route daily   Last time this was given:  15 mLs on 11/4/2017  8:05 AM                                oxyCODONE IR 5 MG tablet   Commonly known as:  ROXICODONE   Take 1-2 tablets (5-10 mg) by mouth every 4 hours as needed for moderate to severe pain   Last time this was given:  10 mg on 11/4/2017  8:26 AM                                polyethylene glycol Packet   Commonly known as:  MIRALAX/GLYCOLAX   Take 17 g by mouth daily as needed for constipation   Last time this was given:  17 g on 10/13/2017  8:12 AM                                protein modular   1 packet by Per Feeding Tube route daily   Last time this was given:  1 packet on 11/4/2017  8:08 AM                                QUEtiapine 50 MG tablet   Commonly known as:  SEROquel   3 tablets (150 mg) by Oral or Feeding Tube route 3 times daily   Last time this was given:  150 mg on 11/4/2017  8:06 AM                                rifampin 25 mg/mL Susp   Commonly known as:  REIFADEN   12 mLs  (300 mg) by Oral or Feeding Tube route 2 times daily   Last time this was given:  300 mg on 11/4/2017  8:08 AM

## 2017-10-06 NOTE — IP AVS SNAPSHOT
[address] [address]    Phone:  [phone]                                       After Visit Summary   10/6/2017    Cricket Miguel    MRN: [id number]           After Visit Summary Signature Page     I have received my discharge instructions, and my questions have been answered. I have discussed any challenges I see with this plan with the nurse or doctor.    ..........................................................................................................................................  Patient/Patient Representative Signature      ..........................................................................................................................................  Patient Representative Print Name and Relationship to Patient    ..................................................               ................................................  Date                                            Time    ..........................................................................................................................................  Reviewed by Signature/Title    ...................................................              ..............................................  Date                                                            Time

## 2017-10-06 NOTE — ED PROVIDER NOTES
History     Chief Complaint   Patient presents with     Altered Mental Status     HPI  Cricket Miguel is a 62 year old male with a history of hypertension, hyperlipidemia, CHD, cardiomyopathy, aortic regurgitation, insufficiency and root dilatation, status-post aortic valve replacement/root repair, and previous severe sepsis. The patient presents via ambulance as a transfer from [facility] ED. The patient was found by his wife face down on the floor this morning after she heard a loud thud. EMS were called and upon arrival patient was unresponsive. He was then brought to [facility] ED where his workup revealed concern for possible aortic dissection. He was then transferred here to the [facility] ED by EMS for further evaluation and treatment. The patient was intubated by EMS en route to the [facility] to protect his airway; patient was given succinylcholine and etomidate. His pressures remained above 65 systolic en route. The patient is currently unable to contribute to history secondary to intubation. He has been given 3 L of IV fluid.     Results of the patient's ED workup from [facility] earlier today are as follows:   Imaging:  CT Chest/Abdomen/Pelvis w Contrast  IMPRESSION:  1. Fluid collection with enhancing margin around the aortic root and  ascending thoracic aorta possibly postsurgical hematoma, dissection or  infected fluid collection. A discrete dissection flap is not  definitely identified.  2. Probable right pyelonephritis with decreased cortical enhancement  posterior mid right kidney. Kidneys are otherwise unremarkable.  Remaining abdominal and pelvic organs are within normal limits. No  diverticulitis or appendicitis. A few scattered liver cysts are  present of doubtful clinical significance.  3. No evidence of pneumonia or effusion. Changes of old granulomatous  disease are noted.  4. No evidence of intra-abdominal or pelvic abscess.   As read by Radiology.     Head CT w/o  Contrast  IMPRESSION: Chronic changes. No evidence for intracranial hemorrhage  or any acute process. As read by Radiology.     Cervical Spine CT w/o Contrast  IMPRESSION: Degenerative changes. No evidence for fracture or any  posterior malalignment. As read by Radiology.     XR Chest Port 1 View  IMPRESSION:  Right IJ line tip projects over the right atrium. Sternal  wires and prosthetic heart valve. Heart and pulmonary vasculature  likely mildly prominent.  Preliminary result as read.     Laboratory:  UA: Blood moderate, Mucous present   Troponin: 0.449 (HH)  CK total: 30  INR: 1.23(H)  PTT: 32  Lactic acid (1052): 3.3(H)  ISTAT: Ph 7.48(H), PO2 21(L), Bicarbonate 29(H), Lactic acid 3.4(H)  CBC: WBC 17.1(H) o/w WNL (HGB 15.0, )   CMP:  Potassium 3.2(L), Glucose 130(H), GFR estimate 54(L), Bilirubin total 1.5(H), Albumin 2.8(L), Protein total 6.7(L), Creatinine 1.33(H) o/w WNL     Blood culture: In process    Past Medical History:   Diagnosis Date     AI (aortic insufficiency)      Aortic root dilatation (H)      AR (aortic regurgitation)     severe     Cardiomyopathy (H)      CHF (congestive heart failure), NYHA class II (H)      COPD, mild (H)     spirometry 12/16     Heart murmur      Hyperlipidemia LDL goal <70 10/31/2010     Hypertension goal BP (blood pressure) < 140/90      Inguinal hernia      left lung nodule  1/29/2008     Major depressive disorder, single episode, moderate (H)      Psoriasis childhood     Tobacco use disorder        Past Surgical History:   Procedure Laterality Date     ARTHROSCOPY KNEE INCISION AND DRAINAGE Left 10/8/2017    Procedure: ARTHROSCOPY KNEE INCISION AND DRAINAGE;  Arthroscopic Incision and Drainage of Left Knee;  Surgeon: Lucretia Munoz MD;  Location: [facility] OR     HC SHOULDER ARTHROSCOPY, DX  2001    Arthroscopy, Shoulder RT Dr OSIRIS Andersen     HC SHOULDER ARTHROSCOPY, DX  1/04    LT arthroscopy Dr OSIRIS Andersen     HERNIA REPAIR, UMBILICAL  1/08    incarcerated -   kwame     HERNIORRHAPHY INGUINAL  12/21/2012    HERNIORRHAPHY INGUINAL;  Right Inguinal Hernia Repair with mesh ;  Surgeon: Mello Rockwell MD;  Location: [facility] OR     REPLACE VALVE AORTIC N/A 5/17/2016    REPLACE VALVE AORTIC - Dr Bowers     ROTATOR CUFF REPAIR RT/LT  11/10    Rt arthroscopy - Dr OSIRIS Andersen     SURGICAL HISTORY OF -   5/16    AVR, severe AR, aortic root repair       Family History   Problem Relation Age of Onset     Genetic Disorder Mother      Bone plateover growth Agustin. shoulder surgeries     CANCER Mother      brain cancer     Alzheimer Disease Father        Social History   Substance Use Topics     Smoking status: Former Smoker     Packs/day: 1.00     Years: 35.00     Quit date: 4/1/2016     Smokeless tobacco: Never Used      Comment: 4/2016     Alcohol use 0.0 oz/week     0 Standard drinks or equivalent per week      Comment: 1 drink per week avg, seldom     Current Facility-Administered Medications   Medication     dexmedetomidine (PRECEDEX) 400 mcg in 0.9% sodium chloride 100 mL     nystatin (MYCOSTATIN) suspension 500,000 Units     gentamicin (GARAMYCIN) infusion 80 mg     aspirin chewable tablet 81 mg     nafcillin IV 2 g vial to attach to  ml bag     lidocaine BUFFERED 1 % injection 1-30 mL     ondansetron (ZOFRAN-ODT) ODT tab 4 mg    Or     ondansetron (ZOFRAN) injection 4 mg     hydrALAZINE (APRESOLINE) injection 10-20 mg     polyethylene glycol (MIRALAX/GLYCOLAX) Packet 17 g     levofloxacin (LEVAQUIN) infusion 750 mg     famotidine (PEPCID) injection 20 mg     influenza quadrivalent (PF) vacc age 3 yrs and older (FLUZONE or Flulaval) injection 0.5 mL     pneumococcal vaccine (PNEUMOVAX 23-sophy) injection 0.5 mL     sodium chloride (PF) 0.9% PF flush 3 mL     sodium chloride (PF) 0.9% PF flush 3 mL     May take oral meds with a sip of water, the morning of OWEN procedure.     multivitamins with minerals (CERTAVITE/CEROVITE) liquid 15 mL     acetaminophen (TYLENOL) solution 650 mg      senna-docusate (SENOKOT-S;PERICOLACE) 8.6-50 MG per tablet 1-2 tablet     prochlorperazine (COMPAZINE) tablet 5-10 mg    Or     prochlorperazine (COMPAZINE) injection 5-10 mg     naloxone (NARCAN) injection 0.1-0.4 mg     dextrose 10 % 1,000 mL infusion     insulin 1 unit/mL in saline (NovoLIN, HumuLIN Regular) drip - ADULT IV Infusion     glucose 40 % gel 15-30 g    Or     dextrose 50 % injection 25-50 mL    Or     glucagon injection 1 mg     lidocaine 1 % 1 mL     lidocaine (LMX4) kit     sodium chloride (PF) 0.9% PF flush 3 mL     Reason beta blocker order not selected     insulin aspart (NovoLOG) inj (RAPID ACTING)     albuterol (PROAIR HFA/PROVENTIL HFA/VENTOLIN HFA) Inhaler 6 puff     albuterol neb solution 2.5 mg     fentaNYL (PF) (SUBLIMAZE) injection  mcg     potassium chloride SA (K-DUR/KLOR-CON M) CR tablet 20-40 mEq     potassium chloride (KLOR-CON) Packet 20-40 mEq     potassium chloride 10 mEq in 100 mL intermittent infusion     potassium chloride 10 mEq in 100 mL intermittent infusion with 10 mg lidocaine     potassium chloride 20 mEq in 100 mL NON-STANDARD CONC intermittent infusion     magnesium sulfate 2 g in NS intermittent infusion (PharMEDium or FV Cmpd)     magnesium sulfate 4 g in 100 mL sterile water (premade)     potassium phosphate 10 mmol in D5W 250 mL intermittent infusion     potassium phosphate 15 mmol in D5W 250 mL intermittent infusion     potassium phosphate 20 mmol in D5W 500 mL intermittent infusion     potassium phosphate 20 mmol in D5W 250 mL intermittent infusion     potassium phosphate 25 mmol in D5W 500 mL intermittent infusion     diphenhydrAMINE (BENADRYL) capsule 25 mg    Or     diphenhydrAMINE (BENADRYL) injection 25 mg     Facility-Administered Medications Ordered in Other Encounters   Medication     etomidate (AMIDATE) injection     rocuronium (ZEMURON) injection        Allergies   Allergen Reactions     Lisinopril Swelling     One-sided facial swelling after  being on lisinopril/HCTZ for one week.       I have reviewed the Medications, Allergies, Past Medical and Surgical History, and Social History in the Epic system.    Review of Systems   Unable to perform ROS: Intubated       Physical Exam      Physical Exam  Exam:  Constitutional: Intubated  Head: Normocephalic. No masses, lesions, tenderness or abnormalities  Neck: Neck supple. No adenopathy. Thyroid symmetric, normal size,, Carotids without bruits.  ENT: ENT exam normal, no neck nodes or sinus tenderness  Cardiovascular: negative, PMI normal. No lifts, heaves, or thrills. RRR. No murmurs, clicks gallops or rub  Respiratory: Intubated   Gastrointestinal: Abdomen soft, non-tender. BS normal. No masses, organomegaly  : Deferred  Musculoskeletal: extremities normal- no gross deformities noted, gait normal and normal muscle tone  Skin: no suspicious lesions or rashes  Neurologic: Intubated.  Hematologic/Lymphatic/Immunologic: Normal cervical lymph nodes      ED Course     ED Course     Procedures     1:05 PM  The patient was seen and examined by Dr. Phelps in Room 1.               EKG Interpretation:      Interpreted by Dr. Phelps  Time reviewed: 1:26 PM  Symptoms at time of EKG: Altered mental status  Rhythm: normal sinus   Rate: Normal  Axis: Normal  Ectopy: none  Conduction: normal  ST Segments/ T Waves: Non-specific ST-T wave abnormalities  Q Waves: none  Comparison to prior: Unchanged from 10/06/17    Clinical Impression: possible left atrial enlargement.          Labs Ordered and Resulted from Time of ED Arrival Up to the Time of Departure from the ED   BLOOD GAS ARTERIAL - Abnormal; Notable for the following:        Result Value    pH Arterial 7.33 (*)     All other components within normal limits   BASIC METABOLIC PANEL - Abnormal; Notable for the following:     Glucose 162 (*)     Calcium 7.0 (*)     All other components within normal limits   CBC WITH PLATELETS - Abnormal; Notable for the following:     WBC  14.2 (*)     RBC Count 4.29 (*)     Hemoglobin 13.0 (*)     Hematocrit 39.4 (*)     All other components within normal limits   INR - Abnormal; Notable for the following:     INR 1.25 (*)     All other components within normal limits   MAGNESIUM - Abnormal; Notable for the following:     Magnesium 1.5 (*)     All other components within normal limits   TROPONIN I - Abnormal; Notable for the following:     Troponin I ES 0.568 (*)     All other components within normal limits   ISTAT  GASES LACTATE SCARLETT POCT - Abnormal; Notable for the following:     Ph Venous 7.29 (*)     All other components within normal limits   LACTIC ACID WHOLE BLOOD   PHOSPHORUS   PARTIAL THROMBOPLASTIN TIME   ISTAT CG4 GASES LACTATE SCARLETT NURSING POCT   ABO/RH TYPE AND SCREEN       Consults  Surgery: At Bedside (10/06/17 1327)    Assessments & Plan (with Medical Decision Making)   mdm  This is a 62-year-old male who was transferred here from an outside hospital secondary to possible sepsis as well as aortic dissection.  Per wife, patient had not been feeling well yesterday with chills and possibly RIGORS.  This morning  She heard him fall and found him on the floor and he was unarousable.  At the outside hospital, patient was noted to the febrile as well as becoming more unresponsive.  CT of the head, C-spine, chest, abdomen and pelvis was performed which revealed aortic dissection of a descending type.  At this time, patient was placed on transferred to this hospital.  In route, patient was intubated by EMT to protect his airway.  Patient was intubated with etomidate and succinylcholine.  Currently, patient is unresponsive.  Physical exam is unremarkable except the patient is intubated.  Differential diagnosis includes sepsis of unknown etiology versus aortic dissection.  Cardiovascular surgery had been notified prior to transfer.  Code aorta White was called upon arrival of the patient and patient is awaiting definitive evaluation and  treatment.    Pt admitted in stable condition and no acute changes in his sx.    I have reviewed the nursing notes.    I have reviewed the findings, diagnosis, plan and need for follow up with the patient.    Current Discharge Medication List          Final diagnoses:   Dissection of aorta, unspecified portion of aorta (H)   Sepsis due to other etiology (H)   NSTEMI (non-ST elevated myocardial infarction) (H)   Hypotension, unspecified hypotension type     I, Evelia Meraz, am serving as a trained medical scribe to document services personally performed by Mario Alberto Phelps MD, based on the provider's statements to me.   I, Mario Alberto Phelps MD, was physically present and have reviewed and verified the accuracy of this note documented by Evelia Meraz.   10/6/2017   [facility], [place], EMERGENCY DEPARTMENT     Mario Alberto Phelps MD  10/15/17 0661

## 2017-10-06 NOTE — ED NOTES
Pt arrived by EMS, transfer from [facility] ED. Code aorta white called upon arrival.

## 2017-10-06 NOTE — CONSULTS
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 Clinically no evidence of infection in L knee, but given his sedation an aspiration was performed to rule out infection of L knee joint.     Recomendations:  Follow synovial fluid cell count, cultures. If evidence is compelling for infection, will discuss with staff and primary team on further course of action. If reassuring labs, Ortho will sign off. Thank you for including us in the care of this patient.           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 History is obtained from chart review and patient's sisters/daughters. CT of the chest showed fluid collection around the prior aortic graft and \"probable right pyelonephritis\" per the read. The family also report that he has been complaining of left knee pain for months and had a steroid injection done about 1 month ago. At that time, there was apparently no suspicion for infection.     Today, he is intubated and cannot provide history. The primary team contact orthopaedics to rule out septic knee joint given his history of knee pain in the setting of signs/symptoms of sepsis.              Past Medical History:     Past Medical History:   Diagnosis Date     AI (aortic " insufficiency)      Aortic root dilatation (H)      AR (aortic regurgitation)     severe     Cardiomyopathy (H)      CHF (congestive heart failure), NYHA class II (H)      COPD, mild (H)     spirometry 12/16     Heart murmur      Hyperlipidemia LDL goal <70 10/31/2010     Hypertension goal BP (blood pressure) < 140/90      Inguinal hernia      left lung nodule  1/29/2008     Major depressive disorder, single episode, moderate (H)      Psoriasis childhood     Tobacco use disorder              Past Surgical History:     Past Surgical History:   Procedure Laterality Date     HC SHOULDER ARTHROSCOPY, DX  2001    Arthroscopy, Shoulder RT Dr OSIRIS Andersen     HC SHOULDER ARTHROSCOPY, DX  1/04    LT arthroscopy Dr OSIRIS Andersen     HERNIA REPAIR, UMBILICAL  1/08    incarcerated - dr rockwell     HERNIORRHAPHY INGUINAL  12/21/2012    HERNIORRHAPHY INGUINAL;  Right Inguinal Hernia Repair with mesh ;  Surgeon: Mello Rockwell MD;  Location: [facility] OR     REPLACE VALVE AORTIC N/A 5/17/2016    REPLACE VALVE AORTIC - Dr Bowers     ROTATOR CUFF REPAIR RT/LT  11/10    Rt arthroscopy - Dr OSIRIS Andersen     SURGICAL HISTORY OF -   5/16    AVR, severe AR, aortic root repair             Social History:     Social History   Substance Use Topics     Smoking status: Former Smoker     Packs/day: 1.00     Years: 35.00     Quit date: 4/1/2016     Smokeless tobacco: Never Used      Comment: 4/2016     Alcohol use 0.0 oz/week     0 Standard drinks or equivalent per week      Comment: 1 drink per week avg, seldom             Family History:   No family history of anesthesia, bleeding or clotting complications.          Immunizations:   Immunizations are up to date          Allergies:     Allergies   Allergen Reactions     Lisinopril Swelling     One-sided facial swelling after being on lisinopril/HCTZ for one week.              Medications:     Current Facility-Administered Medications   Medication     potassium chloride 10 mEq in 100 mL intermittent  "infusion     propofol (DIPRIVAN) infusion     fentaNYL (SUBLIMAZE) infusion     [START ON 10/7/2017] pantoprazole (PROTONIX) 40 mg IV push injection     naloxone (NARCAN) injection 0.1-0.4 mg     niCARdipine (CARDENE) 100 mg in NaCl 0.9 % 250 mL infusion             Review of Systems:   Patient is intubated and sedated, unable to perform ROS.          Physical Exam:   Patient Vitals for the past 24 hrs:   BP Temp Temp src Pulse Heart Rate Resp SpO2 Height Weight   10/06/17 1800 (!) 136/92 103.8  F (39.9  C) Bladder - 104 18 100 % - -   10/06/17 1700 (!) 144/91 - - - 101 - 100 % - -   10/06/17 1600 (!) 145/95 100  F (37.8  C) - - 93 - 99 % - -   10/06/17 1500 (!) 137/101 100  F (37.8  C) - - 85 18 - - -   10/06/17 1430 136/90 99.7  F (37.6  C) - - 87 16 100 % - -   10/06/17 1415 126/89 99.7  F (37.6  C) - - 87 17 100 % - -   10/06/17 1400 113/87 98.6  F (37  C) - - 87 18 100 % - -   10/06/17 1345 103/75 100  F (37.8  C) - - 86 15 100 % - -   10/06/17 1338 - - - - - - - 1.727 m (5' 8\") 92.9 kg (204 lb 12.9 oz)   10/06/17 1335 - 100  F (37.8  C) - - 87 17 95 % - -   10/06/17 1330 97/70 99.3  F (37.4  C) - - 87 17 97 % - -   10/06/17 1325 92/68 101.8  F (38.8  C) - - 87 18 96 % - -   10/06/17 1320 94/75 - - - 87 11 90 % - -   10/06/17 1315 (!) 85/67 - - - 86 19 91 % - -   10/06/17 1310 (!) 88/69 - - - - - - - -   10/06/17 1307 (!) 86/67 99.9  F (37.7  C) Bladder 82 - 21 98 % - -     All vitals have been reviewed  General: Intubated and sedated, not responsive to verbal stimulation.  HEENT: Atraumatic, intubated.   Respiratory: Intubated  Cardiovascular: peripheral pulses 2+ and symmetric, no edema. Cool extremities.   Skin: Splotchy, lacelike light gray rash over bilateral LE around thighs, less over legs.  Neurological: Moves all extremities spontaneously at times, responsive to pain in all extremities.  Musculoskeletal:   LLE: Skin intact, no deformity. No significant effusion of knee. Slight warmth of knee compared " to contralateral side. Passively ranges easily from 0-100, no indication of painful ROM. DP 2+.   RLE: Skin intact, no deformity. No effusion. Slightly cool to touch, 2+ DP. Passively ranges without indication of pain from 0-100.           Data:   I have reviewed all lab results.       Lab Results   Component Value Date     10/06/2017    Lab Results   Component Value Date    CHLORIDE 105 10/06/2017    Lab Results   Component Value Date    BUN 18 10/06/2017      Lab Results   Component Value Date    POTASSIUM 3.4 10/06/2017    Lab Results   Component Value Date    CO2 24 10/06/2017    Lab Results   Component Value Date    CR 1.07 10/06/2017        Lab Results   Component Value Date    WBC 14.2 (H) 10/06/2017    HGB 13.0 (L) 10/06/2017    HCT 39.4 (L) 10/06/2017    MCV 92 10/06/2017     10/06/2017     Lab Results   Component Value Date    SED 6 05/06/2013     CRP: pending  ESR: pending    Synovial Fluid:  Cell Count - pending  Crystals - pending  Cultures - pending      I have reviewed all imaging results.  Ct Chest/abdomen/pelvis W Contrast    Result Date: 10/6/2017  CT CHEST/ABDOMEN/PELVIS WITH CONTRAST 10/6/2017 10:52 AM HISTORY: Sepsis/trauma, further evaluation of source/bleeding. COMPARISON: CT chest, abdomen and pelvis 5/3/2016. TECHNIQUE: Axial images from the thoracic inlet to the symphysis are performed with additional coronal reformatted images. 100 mL of Isovue 370 are given intravenously.  Radiation dose for this scan was reduced using automated exposure control, adjustment of the mA and/or kV according to patient size, or iterative reconstruction technique. FINDINGS: Chest: Scattered calcified granulomas are noted within each lung. No focal infiltrate or consolidation to indicate pneumonia. Dependent atelectasis is noted at the posterior right lung base. There is no pneumothorax. No pleural or pericardial fluid. Heart is mildly enlarged. Patient is status post aortic valve replacement. There  is a large periaortic fluid collection extending from the level of the valve and includes the ascending aorta terminating near the proximal arch. This encompasses the ascending aorta with possible mild wall enhancement on image 35 and in total measures approximately 9.1 x 8.3 cm. Density of this collection measures 40 Hounsfield units. Hematoma from aortic dissection arising from the level of the aortic valve is possible. Due to patient's sepsis and infected collection cannot be completely ruled out. No gross extravasation outside the aorta. No evidence of aneurysm. Aortic arch and descending thoracic aorta are normal in caliber. A few scattered calcified plaques are present. Small mediastinal nodes are present. Some are calcified. Additional calcified bilateral hilar nodes are consistent with old granulomatous disease. Coronary artery calcification is noted. Abdomen: Scattered liver cysts are present which are only partially evaluated on prior chest CT exams. The liver, spleen, pancreas and adrenal glands are within normal limits. Tiny calcified gallstone is noted in the gallbladder on image 74. No surrounding gallbladder inflammation. Left kidney enhances normally. Area of cortical scarring is noted in the upper pole on image 60. Right kidney demonstrates decreased cortical enhancement in the posterior mid right kidney on image 69 of series 2 concerning for changes of pyelonephritis. No obvious renal calculi or hydronephrosis. Abdominal aorta demonstrates calcified plaque without aneurysm or dissection. No enlarged abdominal lymph nodes. The bowel is normal in caliber without obstruction, diverticulitis or appendicitis. Pelvis: The bladder, prostate and rectum are unremarkable. No enlarged pelvic lymph nodes or free fluid. Bone window examination is within normal limits. Degenerative spine changes are noted in the lumbar region.     IMPRESSION: 1. Fluid collection with enhancing margin around the aortic root and  ascending thoracic aorta possibly postsurgical hematoma, dissection or infected fluid collection. A discrete dissection flap is not definitely identified. 2. Probable right pyelonephritis with decreased cortical enhancement posterior mid right kidney. Kidneys are otherwise unremarkable. Remaining abdominal and pelvic organs are within normal limits. No diverticulitis or appendicitis. A few scattered liver cysts are present of doubtful clinical significance. 3. No evidence of pneumonia or effusion. Changes of old granulomatous disease are noted. 4. No evidence of intra-abdominal or pelvic abscess. [Critical Result: Fluid collection about the aortic root and ascending thoracic aorta possibly a dissection, postoperative hematoma or infection.] Finding was identified on 10/6/2017 11:04 AM. Dr. Huff was contacted by me on 10/6/2017 11:25 AM and verbalized understanding of the critical result. DAIJA THORNTON MD    Xr Chest Port 1 View    Result Date: 10/6/2017  EXAM: XR CHEST PORT 1 VW  10/6/2017 1:35 PM  HISTORY: COde aorta COMPARISON: Chest x-ray and CT from earlier today 10/6/2017 FINDINGS: Portable AP view of the chest obtained. Endotracheal tube tip projects 4.5 cm from the bart. Right IJ line tip projects over the mid right atrium. Gastric tube courses beyond the margins of film, sidehole projects over the stomach. Mitral valve prosthesis. Median sternotomy wires. The trachea is midline. The cardiac silhouette is mildly enlarged, unchanged. Mediastinum appears unchanged. Unchanged pulmonary vascular congestion. No focal airspace opacities.     IMPRESSION: 1. Cardiomegaly with pulmonary vascular congestion. 2. Unchanged widening of the mediastinum, which corresponds with the mediastinal hematoma or periaortic abscess seen on CT 10/6/2017. I have personally reviewed the examination and initial interpretation and I agree with the findings. SAMANTHA CHRISTOPHER MD    Xr Chest Port 1 View    Result Date: 10/6/2017  CHEST  ONE VIEW PORTABLE  10/6/2017 12:10 PM HISTORY:  Central line placement. COMPARISON:  5/20/2016     IMPRESSION:  Right IJ line tip projects over the right atrium. Sternal wires and prosthetic heart valve. Heart and pulmonary vasculature likely mildly prominent. VERONICA BRADLEY MD    Cervical Spine Ct W/o Contrast    Result Date: 10/6/2017  CT CERVICAL SPINE WITHOUT CONTRAST  10/6/2017 10:51 AM HISTORY: Fall.  TECHNIQUE: Axial images of the cervical spine were obtained without intravenous contrast. Multiplanar reformations were performed. Radiation dose for this scan was reduced using automated exposure control, adjustment of the mA and/or kV according to patient size, or iterative reconstruction technique.  COMPARISON: None. FINDINGS: Sagittal images demonstrate normal posterior alignment. There is no evidence for fracture. Multilevel degenerative disc and facet disease is present, most advanced at C5-C6 and C6-C7 where there is mild to moderate central canal stenosis. Soft tissue structures are unremarkable except for vascular calcifications.     IMPRESSION: Degenerative changes. No evidence for fracture or any posterior malalignment. KENNY WOOD MD    Head Ct W/o Contrast    Result Date: 10/6/2017  CT SCAN OF THE HEAD WITHOUT CONTRAST  10/6/2017 10:50 AM HISTORY: Fall. TECHNIQUE:  Axial images of the head and coronal reformations without IV contrast material.  Radiation dose for this scan was reduced using automated exposure control, adjustment of the mA and/or kV according to patient size, or iterative reconstruction technique. COMPARISON: None. FINDINGS: There are extensive patchy white matter changes in both hemispheres without mass effect. There is a small area of encephalomalacia in the left occipital pole which is probably due to an old infarct. There is no evidence for intracranial hemorrhage, acute infarct, or mass effect. There is no evidence for skull fracture. Visualized paranasal sinuses and mastoid air  cells are clear.     IMPRESSION: Chronic changes. No evidence for intracranial hemorrhage or any acute process. MD Adiel SNEED MD  Orthopaedic Surgery Intern, G1  [phone]     Attestation:  -

## 2017-10-06 NOTE — IP AVS SNAPSHOT
` `     [address] [facility]: [phone]            Medication Administration Report for Cricket Miguel as of 11/04/17 1220   Legend:    Given Hold Not Given Due Canceled Entry Other Actions    Time Time (Time) Time  Time-Action       Inactive    Active    Linked        Medications 10/29/17 10/30/17 10/31/17 11/01/17 11/02/17 11/03/17 11/04/17    acetaminophen (TYLENOL) solution 975 mg  Dose: 975 mg Freq: EVERY 8 HOURS Route: ORAL OR FEED  Start: 10/21/17 0900   Admin Instructions: Maximum acetaminophen dose from all sources= 75 mg/kg/day not to exceed 4 grams/day.     0009 (975 mg)-Given       0734 (975 mg)-Given       1651 (975 mg)-Given       2343 (975 mg)-Given        0825 (975 mg)-Given       (1614)-Not Given [C]        0045 (975 mg)-Given       0756 (975 mg)-Given       1530 (975 mg)-Given       2350 (975 mg)-Given        0743 (975 mg)-Given       1734 (975 mg)-Given       2354 (975 mg)-Given        0828 (975 mg)-Given       1530 (975 mg)-Given       2309 (975 mg)-Given        0807 (975 mg)-Given       1546 (975 mg)-Given       2338 (975 mg)-Given        0805 (975 mg)-Given       [ ] 1600           albuterol (PROAIR HFA/PROVENTIL HFA/VENTOLIN HFA) Inhaler 6 puff  Dose: 6 puff Freq: EVERY 4 HOURS PRN Route: IN  PRN Reason: shortness of breath / dyspnea  Start: 10/06/17 1850   Admin Instructions: Discontinue after extubation.               albuterol neb solution 2.5 mg  Dose: 2.5 mg Freq: EVERY 2 HOURS PRN Route: NEBULIZATION  PRN Reason: shortness of breath / dyspnea  Start: 10/06/17 1850         2349 (2.5 mg)-Given            amLODIPine (NORVASC) tablet 5 mg  Dose: 5 mg Freq: DAILY Route: PO  Start: 11/01/17 1345       1351 (5 mg)-Given        0828 (5 mg)-Given        0808 (5 mg)-Given        0806 (5 mg)-Given           aspirin chewable tablet 81 mg  Dose: 81 mg Freq: DAILY Route: ORAL OR FEED  Start: 10/12/17 1200    0734 (81 mg)-Given        0826 (81 mg)-Given        0757 (81 mg)-Given        0744 (81  mg)-Given        0828 (81 mg)-Given        0808 (81 mg)-Given        0805 (81 mg)-Given           dextrose 10 % 1,000 mL infusion  Freq: CONTINUOUS PRN Route: IV  PRN Comment: Give if on IV Insulin Infusion, and Parenteral or Enteral nutrition held or cycled off.   Start: 10/06/17 1850   Admin Instructions: Infuse IV D10W at TPN/TF rate whenever nutrition is held or cycled off.               diltiazem 2% in PLO cream (FV COMPOUNDED)  Freq: 2 TIMES DAILY Route: Top  Start: 10/26/17 1115   Admin Instructions: Apply to rectum twice daily.     0735 ( )-Given       2038 ( )-Given        0826 ( )-Given       2016 ( )-Given        (0753)-Not Given       2220 ( )-Given        (0724)-Not Given       1734 ( )-Given        0925 ( )-Given       1533 ( )-Given        0809 ( )-Given       1600 ( )-Given        0841 ( )-Given       [ ] 1600           diphenhydrAMINE (BENADRYL) capsule 25 mg  Dose: 25 mg Freq: EVERY 6 HOURS PRN Route: PO  PRN Reason: itching  Start: 10/06/17 1850   Admin Instructions: Caution to be used when administering multiple Central Nervous System (CNS) depressing meds within a short time frame.              Or  diphenhydrAMINE (BENADRYL) injection 25 mg  Dose: 25 mg Freq: EVERY 6 HOURS PRN Route: IV  PRN Reason: itching  PRN Comment: Only give if patient unable to take PO.  Start: 10/06/17 1850   Admin Instructions: Caution to be used when administering multiple Central Nervous System (CNS) depressing meds within a short time frame.               famotidine (PEPCID) suspension 20 mg  Dose: 20 mg Freq: 2 TIMES DAILY Route: PO  Start: 11/03/17 2000 2036 (20 mg)-Given        0808 (20 mg)-Given       [ ] 2000           fiber modular (NUTRISOURCE FIBER) packet 1 packet  Dose: 1 packet Freq: 3 TIMES DAILY Route: PER FEEDING   Start: 10/31/17 0915   Admin Instructions: Mix each packet with 60 mL water before administering. SUPPLIED BY NUTRITION DEPARTMENT.       0918 (1 packet)-Given       1335 (1  packet)-Given       2019 (1 packet)-Given        0744 (1 packet)-Given       1337 (1 packet)-Given       2012 (1 packet)-Given        0833 (1 packet)-Given       1324 (1 packet)-Given       2000 (1 packet)-Given        0810 (1 packet)-Given       1547 (1 packet)-Given       2037 (1 packet)-Given        0809 (1 packet)-Given       [ ] 1400       [ ] 2000           glucose 40 % gel 15-30 g  Dose: 15-30 g Freq: EVERY 15 MIN PRN Route: PO  PRN Reason: low blood sugar  Start: 10/20/17 1055   Admin Instructions: Give 15 g for BG 51 to 69 mg/dL IF patient is conscious and able to swallow. Give 30 g for BG less than or equal to 50 mg/dL IF patient is conscious and able to swallow. Do NOT give glucose gel via enteral tube.  IF patient has enteral tube: give apple juice 120 mL (4 oz or 15 g of CHO) via enteral tube for BG 51 to 69 mg/dL.  Give apple juice 240 mL (8 oz or 30 g of CHO) via enteral tube for BG less than or equal to 50 mg/dL.    ~Oral gel is preferable for conscious and able to swallow patient.   ~IF gel unavailable or patient refuses may provide apple juice 120 mL (4 oz or 15 g of CHO). Document juice on I and O flowsheet.              Or  dextrose 50 % injection 25-50 mL  Dose: 25-50 mL Freq: EVERY 15 MIN PRN Route: IV  PRN Reason: low blood sugar  Start: 10/20/17 1055   Admin Instructions: Use if have IV access, BG less than 70 mg/dL and meet dose criteria below:  Dose if conscious and alert (or disorientated) and NPO = 25 mL  Dose if unconscious / not alert = 50 mL  Vesicant. Up to 25g may be given IV Push undiluted. Each 5g over 1 minute.              Or  glucagon injection 1 mg  Dose: 1 mg Freq: EVERY 15 MIN PRN Route: SC  PRN Reason: low blood sugar  PRN Comment: May repeat x 1 only  Start: 10/20/17 1055   Admin Instructions: May give SQ or IM. ONLY use glucagon IF patient has NO IV access AND is UNABLE to swallow AND blood glucose is LESS than or EQUAL to 50 mg/dL.               haloperidol lactate  (HALDOL) injection 5 mg  Dose: 5 mg Freq: ONCE Route: IV  Start: 10/31/17 1330   Admin Instructions: Give IV Push undiluted over 1 minute up to 5 mg.       (1720)-Not Given [C]               heparin lock flush 10 UNIT/ML injection 2-5 mL  Dose: 2-5 mL Freq: ONCE PRN Route: IK  PRN Reason: line flush  PRN Comment: for locking each dormant lumen with line placement  Start: 10/20/17 1641   Admin Instructions: May repeat x 1               heparin sodium PF injection 5,000 Units  Dose: 5,000 Units Freq: EVERY 8 HOURS Route: SC  Start: 10/25/17 1015    0401 (5,000 Units)-Given       1158 (5,000 Units)-Given       2038 (5,000 Units)-Given        0412 (5,000 Units)-Given       1200-Hold [C]       2108 (5,000 Units)-Given        (0328)-Not Given       1136 (5,000 Units)-Given       1956 (5,000 Units)-Given        0349 (5,000 Units)-Given       1152 (5,000 Units)-Given       2004 (5,000 Units)-Given        0335 (5,000 Units)-Given       1153 (5,000 Units)-Given       2000 (5,000 Units)-Given        0324 (5,000 Units)-Given       1232 (5,000 Units)-Given       2035 (5,000 Units)-Given        0806 (5,000 Units)-Given       [ ] 1600           hydrochlorothiazide (HYDRODIURIL) tablet 25 mg  Dose: 25 mg Freq: DAILY Route: PO  Start: 11/02/17 0845        1058 (25 mg)-Given        0808 (25 mg)-Given        0806 (25 mg)-Given           insulin aspart (NovoLOG) inj (RAPID ACTING)  Dose: 1-6 Units Freq: EVERY 4 HOURS Route: SC  Start: 10/20/17 1100   Admin Instructions: Correction Scale - MEDIUM INSULIN RESISTANCE DOSING     Do Not give Correction Insulin if BG less than 140.  For  - 189 give 1 unit.  For  - 239 give 2 units.  For  - 289 give 3 units.  For  - 339 give 4 units.  For  - 399 give 5 units.  For BG greater than or equal to 400 give 6 units.  Check blood glucose Q4H and administer based on blood glucose.  Notify provider if glucose greater than or equal to 350 mg/dL after administration of  correction dose.  If given at mealtime, must be administered 5 min before meal or immediately after.     0006 (1 Units)-Given       0403 (1 Units)-Given       0738 (1 Units)-Given       1202 (1 Units)-Given       (1650)-Not Given       2039 (1 Units)-Given       (2343)-Not Given        0429 (1 Units)-Given       0831 (1 Units)-Given [C]       (1201)-Not Given [C]       (1646)-Not Given       (2055)-Not Given        (0105)-Not Given       (0345)-Not Given       0912 (1 Units)-Given       (1136)-Not Given       (1530)-Not Given       (2030)-Not Given       2345 (1 Units)-Given        0348 (1 Units)-Given       0744 (1 Units)-Given       (1152)-Not Given       (1643)-Not Given       (2013)-Not Given [C]       (2354)-Not Given [C]        0335 (1 Units)-Given       0934 (1 Units)-Given       1143 (1 Units)-Given       (1541)-Not Given       (2012)-Not Given        0000 (1 Units)-Given       0325 (1 Units)-Given       0838 (1 Units)-Given       1232 (1 Units)-Given       (1600)-Not Given       (2154)-Not Given       (2355)-Not Given [C]        (0412)-Not Given       (0814)-Not Given       1113 (1 Units)-Given       [ ] 1600       [ ] 2000           insulin aspart (NovoLOG) inj (RAPID ACTING)  Freq: WITH SNACKS OR SUPPLEMENTS Route: SC  PRN Reason: high blood sugar  Start: 10/06/17 1850   Admin Instructions: DOSE = 1 unit/CARBOHYDRATE UNIT.  1 Carb unit equals 15 grams of carbohydrate (CHO).  Only chart total amount of units given.  Administer ONLY IF patient on IV insulin infusion, either 5 minutes before snack or immediately after snack.  Do NOT give if pre-prandial glucose less than or equal to 60 mg/dL.  If given at mealtime, must be administered 5 min before meal or immediately after.               labetalol (NORMODYNE) tablet 200 mg  Dose: 200 mg Freq: EVERY 12 HOURS SCHEDULED Route: PO  Start: 11/03/17 0800         0808 (200 mg)-Given       2035 (200 mg)-Given        0806 (200 mg)-Given       [ ] 2000            "lidocaine (LMX4) kit  Freq: EVERY 1 HOUR PRN Route: Top  PRN Reason: pain  PRN Comment: with VAD insertion or accessing implanted port.  Start: 10/06/17 1850   Admin Instructions: Do NOT give if patient has a history of allergy to any local anesthetic or any \"jose\" product.   Apply 30 minutes prior to VAD insertion or port access.  MAX Dose:  2.5 g (  of 5 g tube)               lidocaine 1 % 1 mL  Dose: 1 mL Freq: EVERY 1 HOUR PRN Route: OTHER  PRN Comment: mild pain with VAD insertion or accessing implanted port  Start: 10/06/17 1850   Admin Instructions: Do NOT give if patient has a history of allergy to any local anesthetic or any \"jose\" product. MAX dose 1 mL subcutaneous OR intradermal in divided doses.               lidocaine BUFFERED 1 % injection 1-30 mL  Dose: 1-30 mL Freq: ONCE PRN Route: ID  PRN Comment: for site local anesthetic  Start: 10/11/17 1026   Admin Instructions: Dose to be divided into smaller volumes appropriate for the procedure. Provider to administer intradermally               magnesium sulfate 2 g in NS intermittent infusion (PharMEDium or FV Cmpd)  Dose: 2 g Freq: DAILY PRN Route: IV  PRN Reason: magnesium supplementation  Start: 10/06/17 1850   Admin Instructions: For Serum Mg++ 1.6 - 2 mg/dL  Give 2 g and recheck magnesium level next AM.               magnesium sulfate 4 g in 100 mL sterile water (premade)  Dose: 4 g Freq: EVERY 4 HOURS PRN Route: IV  PRN Reason: magnesium supplementation  Start: 10/06/17 1850   Admin Instructions: For serum Mg++ less than 1.6 mg/dL  Give 4 g and recheck magnesium level 2 hours after dose, and next AM.               multivitamins with minerals (CERTAVITE/CEROVITE) liquid 15 mL  Dose: 15 mL Freq: DAILY Route: PER FEEDING   Start: 10/07/17 1015    0736 (15 mL)-Given        0826 (15 mL)-Given        0757 (15 mL)-Given        0744 (15 mL)-Given        0828 (15 mL)-Given        0807 (15 mL)-Given        0805 (15 mL)-Given           Nafcillin 2 gm in d5w " 50 mL  Freq: EVERY 4 HOURS Route: IV  Start: 10/25/17 1200   Admin Instructions: Vesicant.     0007 ( )-New Bag       0401 ( )-New Bag       0750 ( )-New Bag       1329 ( )-New Bag       1652 ( )-New Bag       2038 ( )-New Bag       2343 ( )-New Bag        0432 ( )-New Bag       0928 ( )-New Bag       1333 ( )-New Bag       1620 ( )-New Bag       2018 ( )-New Bag        0059 ( )-New Bag       0332 ( )-New Bag       0757 ( )-New Bag       1328 ( )-New Bag       1531 ( )-New Bag       2021 ( )-New Bag       2346 ( )-New Bag        0345 ( )-New Bag       0744 ( )-New Bag       1238 ( )-New Bag       1734 ( )-New Bag       2051 ( )-New Bag        0006 ( )-New Bag       0333 ( )-New Bag       0925 ( )-New Bag       1142 ( )-New Bag       1543 ( )-New Bag       2000 ( )-New Bag       2309 ( )-New Bag        0327 ( )-New Bag       0842 ( )-New Bag       1232 ( )-New Bag       1546 ( )-New Bag       2035 ( )-New Bag       2348 ( )-New Bag        0412 ( )-New Bag       0846 ( )-New Bag       1201 ( )-New Bag       [ ] 1600       [ ] 2000           naloxone (NARCAN) injection 0.1-0.4 mg  Dose: 0.1-0.4 mg Freq: EVERY 2 MIN PRN Route: IV  PRN Reason: opioid reversal  Start: 10/06/17 1850   Admin Instructions: For respiratory rate LESS than or EQUAL to 8.  Partial reversal dose:  0.1 mg titrated q 2 minutes for Analgesia Side Effects Monitoring Sedation Level of 3 (frequently drowsy, arousable, drifts to sleep during conversation).Full reversal dose:  0.4 mg bolus for Analgesia Side Effects Monitoring Sedation Level of 4 (somnolent, minimal or no response to stimulation).               nystatin (MYCOSTATIN) suspension 500,000 Units  Dose: 500,000 Units Freq: EVERY 6 HOURS Route: SWISH & SPIT  Indications of Use: OROPHARYNGEAL CANDIDIASIS  Start: 10/14/17 1200   Admin Instructions: Shake Well.     0203 (500,000 Units)-Given       0736 (500,000 Units)-Given       1358 (500,000 Units)-Given       2038 (500,000 Units)-Given         0412 (500,000 Units)-Given       0826 (500,000 Units)-Given       (1401)-Not Given [C]       2017 (500,000 Units)-Given        0332 (500,000 Units)-Given       0757 (500,000 Units)-Given       1329 (500,000 Units)-Given       2005 (500,000 Units)-Given        0349 (500,000 Units)-Given       0744 (500,000 Units)-Given       1337 (500,000 Units)-Given       2007 (500,000 Units)-Given [C]        0224 (500,000 Units)-Given [C]       0828 (500,000 Units)-Given       1323 (500,000 Units)-Given       2000 (500,000 Units)-Given        0324 (500,000 Units)-Given       0808 (500,000 Units)-Given       (1858)-Not Given       2036 (500,000 Units)-Given        (0126)-Not Given [C]       0806 (500,000 Units)-Given       [ ] 1400       [ ] 2000           ondansetron (ZOFRAN-ODT) ODT tab 4 mg  Dose: 4 mg Freq: EVERY 6 HOURS PRN Route: PO  PRN Reasons: nausea,vomiting  Start: 10/11/17 1026   Admin Instructions: This is Step 1 of nausea and vomiting protocol.  If nausea not resolved in 15 minutes, go to Step 2 prochlorperazine(COMPAZINE). Do not push through foil backing. Peel back foil and gently remove. Place on tongue immediately. Administration with liquid unnecessary                     Or  ondansetron (ZOFRAN) injection 4 mg  Dose: 4 mg Freq: EVERY 6 HOURS PRN Route: IV  PRN Reasons: nausea,vomiting  Start: 10/11/17 1026   Admin Instructions: This is Step 1 of nausea and vomiting protocol.  If nausea not resolved in 15 minutes, go to Step 2 prochlorperazine(COMPAZINE).  Irritant.       0816 (4 mg)-Given               oxyCODONE (ROXICODONE) IR tablet 5-10 mg  Dose: 5-10 mg Freq: EVERY 4 HOURS PRN Route: PO  PRN Reason: moderate to severe pain  Start: 11/01/17 1109        0531 (5 mg)-Given       0625 (5 mg)-Given       1530 (5 mg)-Given        0701 (5 mg)-Given       2339 (5 mg)-Given        0412 (5 mg)-Given       0826 (10 mg)-Given           polyethylene glycol (MIRALAX/GLYCOLAX) Packet 17 g  Dose: 17 g Freq: DAILY PRN Route:  PO  PRN Reason: constipation  Start: 10/18/17 0945   Admin Instructions: 1 Packet = 17 grams. Mixed prescribed dose in 8 ounces of water. Follow with 8 oz. of water.               potassium chloride (KLOR-CON) Packet 20-40 mEq  Dose: 20-40 mEq Freq: EVERY 2 HOURS PRN Route: ORAL OR FEED  PRN Reason: potassium supplementation  Start: 10/06/17 1850   Admin Instructions: Use if unable to tolerate tablets.    If Serum K+ 3.4-4.0, dose = 20 mEq x1. Recheck K+ level the next AM.  If Serum K+ 3.0-3.3, dose = 60 mEq po total dose (40 mEq x 1 followed in 2 hours by 20 mEq X1). Recheck K+ level 4 hours after dose and the next AM.  If Serum K+ 2.5-2.9, dose = 80 mEq po total dose (40 mEq Q2H x2). Recheck K+ level 4 hours after dose and the next AM.  If Serum K+ less than 2.5, See IV order.  Dissolve packet contents in 4-8 ounces of cold water or juice.     0543 (20 mEq)-Given        (0856)-Not Given [C]        2229 (20 mEq)-Given        0502 (20 mEq)-Given        0441 (40 mEq)-Given       0625 (20 mEq)-Given             potassium chloride 10 mEq in 100 mL intermittent infusion  Dose: 10 mEq Freq: EVERY 1 HOUR PRN Route: IV  PRN Reason: potassium supplementation  Start: 10/06/17 1850   Admin Instructions: Infuse via PERIPHERAL LINE or CENTRAL LINE. Use for central line replacement if patient weight less than 65 kg, if patient is on TPN with high potassium content or if unit does not stock 20 mEq bags.  If Serum K+ 3.4-4.0, dose = 10 mEq/hr x2 doses. Recheck K+ level the next AM.  If Serum K+ 3.0-3.3, dose = 10 mEq/hr x4 doses (40 mEq IV total dose). Recheck K+ level 2 hours after dose and the next AM.  If Serum K+ less than 3.0, dose = 10 mEq/hr x6 doses (60 mEq IV total dose). Recheck K+ level 2 hours after dose and the next AM.               potassium chloride 10 mEq in 100 mL intermittent infusion with 10 mg lidocaine  Dose: 10 mEq Freq: EVERY 1 HOUR PRN Route: IV  PRN Reason: potassium supplementation  Start: 10/06/17 5525    Admin Instructions: Infuse via PERIPHERAL LINE. Use potassium with lidocaine for pain with peripheral administration.  If Serum K+ 3.4-4.0, dose = 10 mEq/hr x2 doses. Recheck K+ level the next AM.  If Serum K+ 3.0-3.3, dose = 10 mEq/hr x4 doses (40 mEq IV total dose). Recheck K+ level 2 hours after dose and the next AM.  If Serum K+ less than 3.0, dose = 10 mEq/hr x6 doses (60 mEq IV total dose). Recheck K+ level 2 hours after dose and the next AM.               potassium chloride SA (K-DUR/KLOR-CON M) CR tablet 20-40 mEq  Dose: 20-40 mEq Freq: EVERY 2 HOURS PRN Route: PO  PRN Reason: potassium supplementation  Start: 10/06/17 1850   Admin Instructions: Use if able to take PO.   If Serum K+ 3.4-4.0, dose = 20 mEq x1. Recheck K+ level the next AM.  If Serum K+ 3.0-3.3, dose = 60 mEq po total dose (40 mEq x1 followed in 2 hours by 20 mEq x1). Recheck K+ level 4 hours after dose and the next AM.  If Serum K+ 2.5-2.9, dose = 80 mEq po total dose (40 mEq Q2H x2). Recheck K+ level 4 hours after dose and the next AM.  If Serum K+ less than 2.5, See IV order.  DO NOT CRUSH.               potassium phosphate 10 mmol in D5W 250 mL intermittent infusion  Dose: 10 mmol Freq: DAILY PRN Route: IV  PRN Reason: phosphorous supplementation  Start: 10/06/17 1850   Admin Instructions: For serum phosphorus level 2.5-2.7  Do not infuse Phosphorus in the same line as TPN.   Give 10 mmol and recheck phosphorus level the next AM.               potassium phosphate 15 mmol in D5W 250 mL intermittent infusion  Dose: 15 mmol Freq: DAILY PRN Route: IV  PRN Reason: phosphorous supplementation  Start: 10/06/17 1850   Admin Instructions: For serum phosphorus level 2.0-2.4  Do not infuse Phosphorus in the same line as TPN.   Give 15 mmol and recheck phosphorus level next AM.               potassium phosphate 20 mmol in D5W 250 mL intermittent infusion  Dose: 20 mmol Freq: EVERY 6 HOURS PRN Route: IV  PRN Reason: phosphorous  supplementation  Start: 10/06/17 1850   Admin Instructions: For serum phosphorus level 1.1-1.9  For CENTRAL Line ONLY  Do not infuse Phosphorus in the same line as TPN.   Give 20 mmol and recheck phosphorus level 2 hours after dose and next AM.               potassium phosphate 20 mmol in D5W 500 mL intermittent infusion  Dose: 20 mmol Freq: EVERY 6 HOURS PRN Route: IV  PRN Reason: phosphorous supplementation  Start: 10/06/17 1850   Admin Instructions: For serum phosphorus level 1.1-1.9  For peripheral line  Do not infuse Phosphorus in the same line as TPN.   Give 20 mmol and recheck phosphorus level 2 hours after dose and next AM. Repeat if necessary.               potassium phosphate 25 mmol in D5W 500 mL intermittent infusion  Dose: 25 mmol Freq: EVERY 8 HOURS PRN Route: IV  PRN Reason: phosphorous supplementation  Start: 10/06/17 1850   Admin Instructions: For serum phosphorus level less than 1.1  Do not infuse Phosphorus in the same line as TPN.   Give 25 mmol and recheck phosphorus level 2 hours after dose and next AM.               prochlorperazine (COMPAZINE) injection 5-10 mg  Dose: 5-10 mg Freq: EVERY 6 HOURS PRN Route: IV  PRN Reasons: nausea,vomiting  Start: 10/07/17 1439   Admin Instructions: This is Step 2 of nausea and vomiting management.   If nausea not resolved in 15 minutes, give metoclopramide (REGLAN), if ordered (step 3 of nausea and vomiting management)              Or  prochlorperazine (COMPAZINE) tablet 5-10 mg  Dose: 5-10 mg Freq: EVERY 6 HOURS PRN Route: ORAL OR FEED  PRN Reasons: nausea,vomiting  Start: 10/07/17 1439   Admin Instructions: This is Step 2 of nausea and vomiting management.   If nausea not resolved in 15 minutes, give metoclopramide (REGLAN), if ordered (step 3 of nausea and vomiting management)               protein modular (PROSource TF) 1 packet  Dose: 1 packet Freq: DAILY Route: PER FEEDING   Start: 10/20/17 2000   Admin Instructions: Infuse via syringe down feeding  tube. Flush feeding tube with 15-30 mL of water after administration. Do not mix with other medications.  No mixing or dilution is required. SUPPLIED BY NUTRITION DEPARTMENT.     0739 (1 packet)-Given        0828 (1 packet)-Given        0757 (1 packet)-Given        0744 (1 packet)-Given        0831 (1 packet)-Given        (0800)-Not Given        0808 (1 packet)-Given           QUEtiapine (SEROquel) tablet 150 mg  Dose: 150 mg Freq: 3 TIMES DAILY Route: ORAL OR FEED  Start: 10/31/17 1400      1329 (150 mg)-Given       1956 (150 mg)-Given        0744 (150 mg)-Given       1337 (150 mg)-Given       2115 (150 mg)-Given        0828 (150 mg)-Given       1323 (150 mg)-Given       2000 (150 mg)-Given        0808 (150 mg)-Given       1546 (150 mg)-Given       2035 (150 mg)-Given        0806 (150 mg)-Given       [ ] 1400       [ ] 2000           rifampin (REIFADEN) suspension 300 mg  Dose: 300 mg Freq: 2 TIMES DAILY Route: ORAL OR FEED  Indications of Use: ABSCESS  Start: 10/19/17 1430   Admin Instructions: Shake well.  Recommended to take on an empty stomach.     0739 (300 mg)-Given       2027 (300 mg)-Given        0827 (300 mg)-Given       1957 (300 mg)-Given        0758 (300 mg)-Given       2030 (300 mg)-Given        0743 (300 mg)-Given       2007 (300 mg)-Given        0830 (300 mg)-Given       2000 (300 mg)-Given        0809 (300 mg)-Given       2037 (300 mg)-Given        0808 (300 mg)-Given       [ ] 2000           senna-docusate (SENOKOT-S;PERICOLACE) 8.6-50 MG per tablet 1-2 tablet  Dose: 1-2 tablet Freq: 2 TIMES DAILY PRN Route: ORAL OR FEED  PRN Reason: constipation  Start: 10/18/17 0943   Admin Instructions: Start with 1 tablet PO BID, if no bowel movement in 24 hours, increase to 2 tablets PO BID. Hold for loose stools.               sodium chloride (PF) 0.9% PF flush 3 mL  Dose: 3 mL Freq: EVERY 8 HOURS Route: IV  Start: 10/07/17 1600   Admin Instructions: And Q1H PRN, to lock peripheral IV dormant line.      (0007)-Not Given       0740 (3 mL)-Given       1651 (3 mL)-Given       2344 (3 mL)-Given        0828 (3 mL)-Given       (1736)-Not Given        0101 (3 mL)-Given       0817 (3 mL)-Given       (1530)-Not Given       2347 (3 mL)-Given        (0759)-Not Given       (1643)-Not Given       2355 (3 mL)-Given        0833 (3 mL)-Given       1531 (3 mL)-Given       2309 (3 mL)-Given        0800 (3 mL)-Given       1600 (3 mL)-Given       2338 (3 mL)-Given        (0722)-Not Given       [ ] 1600           sodium chloride (PF) 0.9% PF flush 3 mL  Dose: 3 mL Freq: EVERY 1 HOUR PRN Route: IV  PRN Reason: line flush  PRN Comment: for peripheral IV flush post IV meds  Start: 10/07/17 1558              sodium chloride (PF) 0.9% PF flush 3 mL  Dose: 3 mL Freq: EVERY 8 HOURS Route: IK  Start: 10/06/17 2200   Admin Instructions: And Q1H PRN, to lock peripheral IV dormant line.     (0007)-Not Given       (0740)-Not Given       (1651)-Not Given       2344 (3 mL)-Given        0828 (3 mL)-Given       (1736)-Not Given        (0102)-Not Given       0816 (3 mL)-Given       (1530)-Not Given       2347 (3 mL)-Given        (0759)-Not Given       (1643)-Not Given       2355 (3 mL)-Given        0832 (3 mL)-Given       1531 (3 mL)-Given       2309 (3 mL)-Given        0800 (3 mL)-Given              2338 (3 mL)-Given        (0723)-Not Given       [ ] 1600          Completed Medications  Medications 10/29/17 10/30/17 10/31/17 11/01/17 11/02/17 11/03/17 11/04/17         Dose: 100 mg Freq: ONCE Route: PO  Start: 11/01/17 1145   End: 11/01/17 1258       1258 (100 mg)-Given                Dose: 750 mg Freq: EVERY 24 HOURS Route: IV  Indications of Use: BACTEREMIA  Last Dose: 750 mg (11/04/17 1047)  Start: 10/25/17 1100   End: 11/04/17 1217   Admin Instructions: Irritant. Administer at a rate of no greater than 100 mL/hr     1042 (750 mg)-New Bag        1136 (750 mg)-New Bag        1034 (750 mg)-New Bag        1044 (750 mg)-New Bag        1153 (750 mg)-New  Bag        1030 (750 mg)-New Bag        1047 (750 mg)-New Bag             Start: 11/02/17 2130   End: 11/02/17 2232   Admin Instructions: Kinjal Carlton : cabinet override         2232 (500 mL)-New Bag               Dose: 0.5 mL Freq: PRIOR TO DISCHARGE Route: IM  Start: 10/15/17 1000   End: 11/04/17 1048   Admin Instructions: Administer when afebrile (LESs than 100.4 ) x 24 hr OR prior to discharge.   Give Fact Sheet to Patient.  If patient is febrile, reassess in 8 hrs or every shift. Minor illnesses with or without fever does NOT contraindicate the vaccination. Inject into the anterolateral aspect of the thigh (infants) or deltoid muscle (toddlers/children); do not inject in the gluteal area, or in areas of major nerve trunks and/or blood vessels. IM administration for children 6 months to 2 years should be vaccinated in the anterolateral aspect of thigh.  If not administering when scheduled , change the due time by following the instructions in the reference link below. If patient refuses vaccine, chart as Vaccine Refused.             1048 (0.5 mL)-Given          Discontinued Medications  Medications 10/29/17 10/30/17 10/31/17 11/01/17 11/02/17 11/03/17 11/04/17         Rate: 4.6-16.2 mL/hr Freq: CONTINUOUS Route: IV  Last Dose: 0.2 mcg/kg/hr (11/02/17 0935)  Start: 10/14/17 1015   End: 11/02/17 1031   Admin Instructions: For range orders: start at lowest dose ordered. Titrate by 0.1 mcg/kg/hr every 5 minutes to achieve the defined goals of therapy.       0000 (0.4 mcg/kg/hr)-Rate/Dose Verify       0400 (0.4 mcg/kg/hr)-Rate/Dose Verify       0544 (0.4 mcg/kg/hr)-New Bag       0737 (0.7 mcg/kg/hr)-New Bag       0836 (0.4 mcg/kg/hr)-Rate/Dose Change       0840-Stopped       1039 (0.4 mcg/kg/hr)-Restarted       1100 (0.5 mcg/kg/hr)-Rate/Dose Change       1200 (0.5 mcg/kg/hr)-Rate/Dose Verify       1300 (0.5 mcg/kg/hr)-Rate/Dose Verify       1400 (0.7 mcg/kg/hr)-Rate/Dose Change       1648 (0.7 mcg/kg/hr)-New  Bag       1700 (0.7 mcg/kg/hr)-Rate/Dose Verify       2000 (0.7 mcg/kg/hr)-Rate/Dose Verify       2337 (0.7 mcg/kg/hr)-New Bag        0400 (0.7 mcg/kg/hr)-Rate/Dose Verify       0635 (0.2 mcg/kg/hr)-New Bag       0847 (0.4 mcg/kg/hr)-Rate/Dose Change       1131 (0.7 mcg/kg/hr)-Rate/Dose Change       1504 (0.7 mcg/kg/hr)-New Bag       1630-Stopped       1700 (0.4 mcg/kg/hr)-Restarted       1800 (0.7 mcg/kg/hr)-Rate/Dose Change       2244 (0.7 mcg/kg/hr)-New Bag        0148 (0.6 mcg/kg/hr)-Rate/Dose Change       0157 (0.5 mcg/kg/hr)-Rate/Dose Change       0203 (0.4 mcg/kg/hr)-Rate/Dose Change       0216 (0.3 mcg/kg/hr)-Rate/Dose Change       0411 (0.4 mcg/kg/hr)-Rate/Dose Change       0700 (0.4 mcg/kg/hr)-Rate/Dose Verify       0800 (0.4 mcg/kg/hr)-Rate/Dose Verify       0900 (0.4 mcg/kg/hr)-Rate/Dose Verify       0916 (0.3 mcg/kg/hr)-New Bag       1000 (0.3 mcg/kg/hr)-Rate/Dose Verify       1100 (0.3 mcg/kg/hr)-Rate/Dose Verify       1125 (0.4 mcg/kg/hr)-Rate/Dose Change       1200 (0.3 mcg/kg/hr)-Rate/Dose Change       1300 (0.3 mcg/kg/hr)-Rate/Dose Verify       1400 (0.3 mcg/kg/hr)-Rate/Dose Verify       1500 (0.4 mcg/kg/hr)-Rate/Dose Change       1600 (0.3 mcg/kg/hr)-Rate/Dose Change       1700 (0.3 mcg/kg/hr)-Rate/Dose Verify       1800 (0.2 mcg/kg/hr)-Rate/Dose Change       1900 (0.2 mcg/kg/hr)-Rate/Dose Verify       2000 (0.6 mcg/kg/hr)-Rate/Dose Change       2100 (0.4 mcg/kg/hr)-Rate/Dose Change       2210 (0.6 mcg/kg/hr)-New Bag       2300 (0.5 mcg/kg/hr)-Rate/Dose Change        0130 (0.4 mcg/kg/hr)-Rate/Dose Change       0143 (0.3 mcg/kg/hr)-Rate/Dose Change       0400 (0.4 mcg/kg/hr)-Rate/Dose Change       0509 (0.4 mcg/kg/hr)-New Bag       0600 (0.3 mcg/kg/hr)-Rate/Dose Change       0700 (0.3 mcg/kg/hr)-Rate/Dose Verify       0800 (0.2 mcg/kg/hr)-Rate/Dose Change       0900 (0.2 mcg/kg/hr)-Rate/Dose Verify       1000 (0.2 mcg/kg/hr)-Rate/Dose Verify       1100 (0.2 mcg/kg/hr)-Rate/Dose Verify       1200  (0.2 mcg/kg/hr)-Rate/Dose Verify       1300 (0.3 mcg/kg/hr)-Rate/Dose Change       1400 (0.3 mcg/kg/hr)-Rate/Dose Verify       1500 (0.3 mcg/kg/hr)-Rate/Dose Verify       1600 (0.3 mcg/kg/hr)-Rate/Dose Verify       1700 (0.2 mcg/kg/hr)-Rate/Dose Change       1800 (0.2 mcg/kg/hr)-Rate/Dose Verify       1900 (0.3 mcg/kg/hr)-Rate/Dose Change       2000 (0.3 mcg/kg/hr)-Rate/Dose Verify       2003 (0.3 mcg/kg/hr)-New Bag        0023 (0.5 mcg/kg/hr)-Rate/Dose Change       0100 (0.5 mcg/kg/hr)-Rate/Dose Verify [C]              0225 (0.3 mcg/kg/hr)-Rate/Dose Change       0321 (0.2 mcg/kg/hr)-Rate/Dose Change       0354 (0.1 mcg/kg/hr)-Rate/Dose Change [C]       0525 (0.3 mcg/kg/hr)-Rate/Dose Change       0935 (0.2 mcg/kg/hr)-Rate/Dose Change       1031-Med Discontinued           Dose: 20 mg Freq: EVERY 24 HOURS Route: ORAL OR FEED  Start: 10/20/17 1000   End: 11/03/17 1021    0908 (20 mg)-Given        0826 (20 mg)-Given        0758 (20 mg)-Given        0743 (20 mg)-Given        0830 (20 mg)-Given        0808 (20 mg)-Given       1021-Med Discontinued          Dose: 10 mg Freq: EVERY 6 HOURS PRN Route: IV  PRN Reason: high blood pressure  PRN Comment: For SBP persistently over 180 after pain is controlled. Thanks.  Start: 11/01/17 2149   End: 11/02/17 1031   Admin Instructions: For ordered doses up to 40 mg, give IV Push undiluted over 1 minute.        2305 (10 mg)-Given        1031-Med Discontinued           Dose: 200 mg Freq: DAILY Route: PO  Start: 11/02/17 0800   End: 11/03/17 0608        0829 (200 mg)-Given        0608-Med Discontinued

## 2017-10-07 ENCOUNTER — APPOINTMENT (OUTPATIENT)
Dept: GENERAL RADIOLOGY | Facility: CLINIC | Age: 62
DRG: 004 | End: 2017-10-07
Attending: THORACIC SURGERY (CARDIOTHORACIC VASCULAR SURGERY)
Payer: COMMERCIAL

## 2017-10-07 ENCOUNTER — ANESTHESIA EVENT (OUTPATIENT)
Dept: SURGERY | Facility: CLINIC | Age: 62
DRG: 004 | End: 2017-10-07
Payer: COMMERCIAL

## 2017-10-07 ENCOUNTER — APPOINTMENT (OUTPATIENT)
Dept: CARDIOLOGY | Facility: CLINIC | Age: 62
DRG: 004 | End: 2017-10-07
Attending: INTERNAL MEDICINE
Payer: COMMERCIAL

## 2017-10-07 ENCOUNTER — APPOINTMENT (OUTPATIENT)
Dept: CT IMAGING | Facility: CLINIC | Age: 62
DRG: 004 | End: 2017-10-07
Attending: PSYCHIATRY & NEUROLOGY
Payer: COMMERCIAL

## 2017-10-07 ENCOUNTER — ALLIED HEALTH/NURSE VISIT (OUTPATIENT)
Dept: NEUROLOGY | Facility: CLINIC | Age: 62
DRG: 004 | End: 2017-10-07
Attending: PSYCHIATRY & NEUROLOGY
Payer: COMMERCIAL

## 2017-10-07 ENCOUNTER — APPOINTMENT (OUTPATIENT)
Dept: GENERAL RADIOLOGY | Facility: CLINIC | Age: 62
DRG: 004 | End: 2017-10-07
Attending: PSYCHIATRY & NEUROLOGY
Payer: COMMERCIAL

## 2017-10-07 DIAGNOSIS — G93.40 ENCEPHALOPATHY: Primary | ICD-10-CM

## 2017-10-07 LAB
ANION GAP SERPL CALCULATED.3IONS-SCNC: 8 MMOL/L (ref 3–14)
BASE EXCESS BLDA CALC-SCNC: 2.2 MMOL/L
BUN SERPL-MCNC: 18 MG/DL (ref 7–30)
CA-I BLD-MCNC: 4.2 MG/DL (ref 4.4–5.2)
CALCIUM SERPL-MCNC: 7.9 MG/DL (ref 8.5–10.1)
CHLORIDE SERPL-SCNC: 106 MMOL/L (ref 94–109)
CO2 SERPL-SCNC: 24 MMOL/L (ref 20–32)
CREAT SERPL-MCNC: 1.06 MG/DL (ref 0.66–1.25)
CRP SERPL-MCNC: 290 MG/L (ref 0–8)
ERYTHROCYTE [DISTWIDTH] IN BLOOD BY AUTOMATED COUNT: 14.4 % (ref 10–15)
GFR SERPL CREATININE-BSD FRML MDRD: 70 ML/MIN/1.7M2
GLUCOSE BLDC GLUCOMTR-MCNC: 118 MG/DL (ref 70–99)
GLUCOSE BLDC GLUCOMTR-MCNC: 120 MG/DL (ref 70–99)
GLUCOSE BLDC GLUCOMTR-MCNC: 125 MG/DL (ref 70–99)
GLUCOSE BLDC GLUCOMTR-MCNC: 131 MG/DL (ref 70–99)
GLUCOSE BLDC GLUCOMTR-MCNC: 132 MG/DL (ref 70–99)
GLUCOSE BLDC GLUCOMTR-MCNC: 134 MG/DL (ref 70–99)
GLUCOSE BLDC GLUCOMTR-MCNC: 135 MG/DL (ref 70–99)
GLUCOSE BLDC GLUCOMTR-MCNC: 135 MG/DL (ref 70–99)
GLUCOSE BLDC GLUCOMTR-MCNC: 139 MG/DL (ref 70–99)
GLUCOSE BLDC GLUCOMTR-MCNC: 153 MG/DL (ref 70–99)
GLUCOSE BLDC GLUCOMTR-MCNC: 154 MG/DL (ref 70–99)
GLUCOSE BLDC GLUCOMTR-MCNC: 173 MG/DL (ref 70–99)
GLUCOSE SERPL-MCNC: 138 MG/DL (ref 70–99)
HBA1C MFR BLD: 6.4 % (ref 4.3–6)
HCO3 BLD-SCNC: 26 MMOL/L (ref 21–28)
HCT VFR BLD AUTO: 38.8 % (ref 40–53)
HGB BLD-MCNC: 13.1 G/DL (ref 13.3–17.7)
MAGNESIUM SERPL-MCNC: 2.1 MG/DL (ref 1.6–2.3)
MAGNESIUM SERPL-MCNC: 2.2 MG/DL (ref 1.6–2.3)
MCH RBC QN AUTO: 30.3 PG (ref 26.5–33)
MCHC RBC AUTO-ENTMCNC: 33.8 G/DL (ref 31.5–36.5)
MCV RBC AUTO: 90 FL (ref 78–100)
O2/TOTAL GAS SETTING VFR VENT: 40 %
PCO2 BLD: 36 MM HG (ref 35–45)
PH BLD: 7.46 PH (ref 7.35–7.45)
PHOSPHATE SERPL-MCNC: 1.6 MG/DL (ref 2.5–4.5)
PHOSPHATE SERPL-MCNC: 2.4 MG/DL (ref 2.5–4.5)
PLATELET # BLD AUTO: 132 10E9/L (ref 150–450)
PO2 BLD: 95 MM HG (ref 80–105)
POTASSIUM SERPL-SCNC: 3.9 MMOL/L (ref 3.4–5.3)
RADIOLOGIST FLAGS: ABNORMAL
RBC # BLD AUTO: 4.33 10E12/L (ref 4.4–5.9)
SODIUM SERPL-SCNC: 138 MMOL/L (ref 133–144)
TROPONIN I SERPL-MCNC: 1.24 UG/L (ref 0–0.04)
WBC # BLD AUTO: 11 10E9/L (ref 4–11)

## 2017-10-07 PROCEDURE — 00000146 ZZHCL STATISTIC GLUCOSE BY METER IP

## 2017-10-07 PROCEDURE — 82330 ASSAY OF CALCIUM: CPT | Performed by: THORACIC SURGERY (CARDIOTHORACIC VASCULAR SURGERY)

## 2017-10-07 PROCEDURE — 93005 ELECTROCARDIOGRAM TRACING: CPT

## 2017-10-07 PROCEDURE — 84100 ASSAY OF PHOSPHORUS: CPT | Performed by: SURGERY

## 2017-10-07 PROCEDURE — 25000125 ZZHC RX 250: Performed by: THORACIC SURGERY (CARDIOTHORACIC VASCULAR SURGERY)

## 2017-10-07 PROCEDURE — 27210437 ZZH NUTRITION PRODUCT SEMIELEM INTERMED LITER

## 2017-10-07 PROCEDURE — 40000986 XR ABDOMEN PORT F1 VW

## 2017-10-07 PROCEDURE — 93325 DOPPLER ECHO COLOR FLOW MAPG: CPT | Mod: 26 | Performed by: INTERNAL MEDICINE

## 2017-10-07 PROCEDURE — 25000128 H RX IP 250 OP 636: Performed by: THORACIC SURGERY (CARDIOTHORACIC VASCULAR SURGERY)

## 2017-10-07 PROCEDURE — 93320 DOPPLER ECHO COMPLETE: CPT

## 2017-10-07 PROCEDURE — S0032 INJECTION, NAFCILLIN SODIUM: HCPCS | Performed by: SURGERY

## 2017-10-07 PROCEDURE — 20000004 ZZH R&B ICU UMMC

## 2017-10-07 PROCEDURE — 86140 C-REACTIVE PROTEIN: CPT | Performed by: SURGERY

## 2017-10-07 PROCEDURE — 40000986 XR CHEST PORT 1 VW

## 2017-10-07 PROCEDURE — 93010 ELECTROCARDIOGRAM REPORT: CPT | Performed by: INTERNAL MEDICINE

## 2017-10-07 PROCEDURE — 25800025 ZZH RX 258: Performed by: THORACIC SURGERY (CARDIOTHORACIC VASCULAR SURGERY)

## 2017-10-07 PROCEDURE — 25000132 ZZH RX MED GY IP 250 OP 250 PS 637: Performed by: THORACIC SURGERY (CARDIOTHORACIC VASCULAR SURGERY)

## 2017-10-07 PROCEDURE — 70450 CT HEAD/BRAIN W/O DYE: CPT

## 2017-10-07 PROCEDURE — 40000014 ZZH STATISTIC ARTERIAL MONITORING DAILY

## 2017-10-07 PROCEDURE — 99291 CRITICAL CARE FIRST HOUR: CPT | Mod: GC | Performed by: ANESTHESIOLOGY

## 2017-10-07 PROCEDURE — S0028 INJECTION, FAMOTIDINE, 20 MG: HCPCS | Performed by: THORACIC SURGERY (CARDIOTHORACIC VASCULAR SURGERY)

## 2017-10-07 PROCEDURE — 25000125 ZZHC RX 250: Performed by: SURGERY

## 2017-10-07 PROCEDURE — 93320 DOPPLER ECHO COMPLETE: CPT | Mod: 26 | Performed by: INTERNAL MEDICINE

## 2017-10-07 PROCEDURE — 25000132 ZZH RX MED GY IP 250 OP 250 PS 637: Performed by: SURGERY

## 2017-10-07 PROCEDURE — 84100 ASSAY OF PHOSPHORUS: CPT | Performed by: THORACIC SURGERY (CARDIOTHORACIC VASCULAR SURGERY)

## 2017-10-07 PROCEDURE — 84484 ASSAY OF TROPONIN QUANT: CPT | Performed by: THORACIC SURGERY (CARDIOTHORACIC VASCULAR SURGERY)

## 2017-10-07 PROCEDURE — 94003 VENT MGMT INPAT SUBQ DAY: CPT

## 2017-10-07 PROCEDURE — 25000128 H RX IP 250 OP 636: Performed by: SURGERY

## 2017-10-07 PROCEDURE — 84134 ASSAY OF PREALBUMIN: CPT | Performed by: SURGERY

## 2017-10-07 PROCEDURE — 83735 ASSAY OF MAGNESIUM: CPT | Performed by: THORACIC SURGERY (CARDIOTHORACIC VASCULAR SURGERY)

## 2017-10-07 PROCEDURE — 40000281 ZZH STATISTIC TRANSPORT TIME EA 15 MIN

## 2017-10-07 PROCEDURE — 93312 ECHO TRANSESOPHAGEAL: CPT | Mod: 26 | Performed by: INTERNAL MEDICINE

## 2017-10-07 PROCEDURE — 40000275 ZZH STATISTIC RCP TIME EA 10 MIN

## 2017-10-07 PROCEDURE — 83735 ASSAY OF MAGNESIUM: CPT | Performed by: SURGERY

## 2017-10-07 PROCEDURE — 82803 BLOOD GASES ANY COMBINATION: CPT | Performed by: SURGERY

## 2017-10-07 PROCEDURE — 83036 HEMOGLOBIN GLYCOSYLATED A1C: CPT | Performed by: THORACIC SURGERY (CARDIOTHORACIC VASCULAR SURGERY)

## 2017-10-07 PROCEDURE — 80048 BASIC METABOLIC PNL TOTAL CA: CPT | Performed by: THORACIC SURGERY (CARDIOTHORACIC VASCULAR SURGERY)

## 2017-10-07 PROCEDURE — 85027 COMPLETE CBC AUTOMATED: CPT | Performed by: THORACIC SURGERY (CARDIOTHORACIC VASCULAR SURGERY)

## 2017-10-07 PROCEDURE — 95951 ZZHC EEG VIDEO < 12 HR: CPT | Mod: 52,ZF

## 2017-10-07 RX ORDER — NAFCILLIN SODIUM 2 G/8ML
2 INJECTION, POWDER, FOR SOLUTION INTRAMUSCULAR; INTRAVENOUS EVERY 4 HOURS
Status: DISCONTINUED | OUTPATIENT
Start: 2017-10-07 | End: 2017-10-12

## 2017-10-07 RX ORDER — PROPOFOL 10 MG/ML
5-75 INJECTION, EMULSION INTRAVENOUS CONTINUOUS
Status: DISCONTINUED | OUTPATIENT
Start: 2017-10-07 | End: 2017-10-15

## 2017-10-07 RX ORDER — PROCHLORPERAZINE MALEATE 5 MG
5-10 TABLET ORAL EVERY 6 HOURS PRN
Status: DISCONTINUED | OUTPATIENT
Start: 2017-10-07 | End: 2017-11-04 | Stop reason: HOSPADM

## 2017-10-07 RX ORDER — LEVOFLOXACIN 5 MG/ML
750 INJECTION, SOLUTION INTRAVENOUS EVERY 24 HOURS
Status: DISCONTINUED | OUTPATIENT
Start: 2017-10-07 | End: 2017-10-09

## 2017-10-07 RX ORDER — AMOXICILLIN 250 MG
1-2 CAPSULE ORAL 2 TIMES DAILY
Status: DISCONTINUED | OUTPATIENT
Start: 2017-10-07 | End: 2017-10-18

## 2017-10-07 RX ADMIN — POTASSIUM PHOSPHATE, MONOBASIC AND POTASSIUM PHOSPHATE, DIBASIC 15 MMOL: 224; 236 INJECTION, SOLUTION INTRAVENOUS at 06:05

## 2017-10-07 RX ADMIN — ONDANSETRON 4 MG: 2 INJECTION INTRAMUSCULAR; INTRAVENOUS at 11:16

## 2017-10-07 RX ADMIN — HEPARIN SODIUM 5000 UNITS: 5000 INJECTION, SOLUTION INTRAVENOUS; SUBCUTANEOUS at 09:50

## 2017-10-07 RX ADMIN — GENTAMICIN SULFATE 230 MG: 40 INJECTION, SOLUTION INTRAMUSCULAR; INTRAVENOUS at 07:46

## 2017-10-07 RX ADMIN — HYDRALAZINE HYDROCHLORIDE 10 MG: 20 INJECTION INTRAMUSCULAR; INTRAVENOUS at 10:22

## 2017-10-07 RX ADMIN — NAFCILLIN SODIUM 2 G: 2 INJECTION, POWDER, LYOPHILIZED, FOR SOLUTION INTRAMUSCULAR; INTRAVENOUS at 14:56

## 2017-10-07 RX ADMIN — VANCOMYCIN HYDROCHLORIDE 1750 MG: 1 INJECTION, POWDER, LYOPHILIZED, FOR SOLUTION INTRAVENOUS at 00:43

## 2017-10-07 RX ADMIN — NAFCILLIN SODIUM 2 G: 2 INJECTION, POWDER, LYOPHILIZED, FOR SOLUTION INTRAMUSCULAR; INTRAVENOUS at 21:44

## 2017-10-07 RX ADMIN — HEPARIN SODIUM 5000 UNITS: 5000 INJECTION, SOLUTION INTRAVENOUS; SUBCUTANEOUS at 02:01

## 2017-10-07 RX ADMIN — MULTIVITAMIN 15 ML: LIQUID ORAL at 13:17

## 2017-10-07 RX ADMIN — PROPOFOL 20 MCG/KG/MIN: 10 INJECTION, EMULSION INTRAVENOUS at 17:50

## 2017-10-07 RX ADMIN — PROPOFOL 10 MCG/KG/MIN: 10 INJECTION, EMULSION INTRAVENOUS at 03:09

## 2017-10-07 RX ADMIN — HYDRALAZINE HYDROCHLORIDE 10 MG: 20 INJECTION INTRAMUSCULAR; INTRAVENOUS at 15:54

## 2017-10-07 RX ADMIN — FENTANYL CITRATE 50 MCG: 50 INJECTION, SOLUTION INTRAMUSCULAR; INTRAVENOUS at 09:50

## 2017-10-07 RX ADMIN — POTASSIUM PHOSPHATE, MONOBASIC AND POTASSIUM PHOSPHATE, DIBASIC 20 MMOL: 224; 236 INJECTION, SOLUTION INTRAVENOUS at 19:31

## 2017-10-07 RX ADMIN — POTASSIUM CHLORIDE, DEXTROSE MONOHYDRATE AND SODIUM CHLORIDE: 150; 5; 450 INJECTION, SOLUTION INTRAVENOUS at 17:21

## 2017-10-07 RX ADMIN — ASPIRIN 81 MG CHEWABLE TABLET 81 MG: 81 TABLET CHEWABLE at 07:56

## 2017-10-07 RX ADMIN — SENNOSIDES AND DOCUSATE SODIUM 2 TABLET: 8.6; 5 TABLET ORAL at 07:56

## 2017-10-07 RX ADMIN — ACETAMINOPHEN 650 MG: 325 SOLUTION ORAL at 02:05

## 2017-10-07 RX ADMIN — HYDRALAZINE HYDROCHLORIDE 10 MG: 20 INJECTION INTRAMUSCULAR; INTRAVENOUS at 21:38

## 2017-10-07 RX ADMIN — POTASSIUM CHLORIDE 20 MEQ: 1.5 POWDER, FOR SOLUTION ORAL at 06:06

## 2017-10-07 RX ADMIN — NAFCILLIN SODIUM 2 G: 2 INJECTION, POWDER, LYOPHILIZED, FOR SOLUTION INTRAMUSCULAR; INTRAVENOUS at 17:22

## 2017-10-07 RX ADMIN — LEVOFLOXACIN 750 MG: 5 INJECTION, SOLUTION INTRAVENOUS at 11:22

## 2017-10-07 RX ADMIN — HUMAN INSULIN 1 UNITS/HR: 100 INJECTION, SOLUTION SUBCUTANEOUS at 00:57

## 2017-10-07 RX ADMIN — FENTANYL CITRATE 50 MCG/HR: 50 INJECTION, SOLUTION INTRAMUSCULAR; INTRAVENOUS at 12:57

## 2017-10-07 RX ADMIN — ACETAMINOPHEN 650 MG: 325 SOLUTION ORAL at 06:06

## 2017-10-07 RX ADMIN — FAMOTIDINE 20 MG: 10 INJECTION, SOLUTION INTRAVENOUS at 22:37

## 2017-10-07 RX ADMIN — HYDRALAZINE HYDROCHLORIDE 10 MG: 20 INJECTION INTRAMUSCULAR; INTRAVENOUS at 23:55

## 2017-10-07 RX ADMIN — FAMOTIDINE 20 MG: 10 INJECTION, SOLUTION INTRAVENOUS at 09:50

## 2017-10-07 RX ADMIN — MUPIROCIN 0.5 G: 20 OINTMENT TOPICAL at 07:46

## 2017-10-07 ASSESSMENT — VISUAL ACUITY
OU: OTHER (SEE COMMENT)

## 2017-10-07 ASSESSMENT — COPD QUESTIONNAIRES
COPD: 1
CAT_SEVERITY: MILD

## 2017-10-07 ASSESSMENT — ACTIVITIES OF DAILY LIVING (ADL)
AMBULATION: 0-->INDEPENDENT
RETIRED_COMMUNICATION: 0-->UNDERSTANDS/COMMUNICATES WITHOUT DIFFICULTY
COGNITION: 0 - NO COGNITION ISSUES REPORTED
SWALLOWING: 0-->SWALLOWS FOODS/LIQUIDS WITHOUT DIFFICULTY
FALL_HISTORY_WITHIN_LAST_SIX_MONTHS: NO
TOILETING: 0-->INDEPENDENT
TRANSFERRING: 0-->INDEPENDENT
RETIRED_EATING: 0-->INDEPENDENT
DRESS: 0-->INDEPENDENT
BATHING: 0-->INDEPENDENT

## 2017-10-07 ASSESSMENT — LIFESTYLE VARIABLES: TOBACCO_USE: 1

## 2017-10-07 NOTE — PROGRESS NOTES
CVTS Progress Note     Dissection of aorta, unspecified portion of aorta (H)  Sepsis due to other etiology (H)  NSTEMI (non-ST elevated myocardial infarction) (H)  Hypotension, unspecified hypotension type    Subjective: no acute events since arrival, strokes identified on MRI, no intervention warranted per neurology, remained lightly sedated overnight but no purposeful movements.    Objective:  Temp:  [98.6  F (37  C)-103.8  F (39.9  C)] 98.6  F (37  C)  Pulse:  [82] 82  Heart Rate:  [] 80  Resp:  [11-21] 18  BP: ()/() 117/78  MAP:  [66 mmHg-124 mmHg] 83 mmHg  Arterial Line BP: ()/(54-96) 115/68  FiO2 (%):  [40 %-60 %] 40 %  SpO2:  [90 %-100 %] 98 %    Ventilation Mode: CMV/AC  FiO2 (%): 40 %  Rate Set (breaths/minute): 18 breaths/min  Tidal Volume Set (mL): 500 mL  PEEP (cm H2O): 5 cmH2O  Oxygen Concentration (%): 40 %  Resp: 18    I/O last 3 completed shifts:  In: 2017.85 [I.V.:1747.85; NG/GT:270]  Out: 1810 [Urine:1810]    PE:  General: Intubated unresponsive  Neck: Supple, no tracheal deviation  Cardiovascular: RRR  Pumonary: Non labored breathing on MV  Abdomen: Soft, non distended, non tender.   Ext: W/o edema, wwp  Neuro: no awakening to verbal or painful stimuli    BMP    Recent Labs  Lab 10/07/17  0422 10/06/17  1859 10/06/17  1322 10/06/17  1020     --  138 134   POTASSIUM 3.9 3.2* 3.4 3.2*   CHLORIDE 106  --  105 96   CO2 24  --  24 29   BUN 18  --  18 20   CR 1.06  --  1.07 1.33*   *  --  162* 130*   MAG 2.2 2.0 1.5*  --    PHOS 2.4* 1.5* 2.7  --      CBC    Recent Labs  Lab 10/07/17  0422 10/06/17  1322 10/06/17  1020   WBC 11.0 14.2* 17.1*   HGB 13.1* 13.0* 15.0   * 150 185     INR/PTT    Recent Labs  Lab 10/06/17  1322 10/06/17  1020   INR 1.25* 1.23*   PTT 32 32     ABG    Recent Labs  Lab 10/07/17  0651 10/06/17  1859 10/06/17  1327   PH 7.46* 7.48* 7.33*   PCO2 36 35 43   PO2 95 73* 103   HCO3 26 26 23     LFT    Recent Labs  Lab 10/06/17  1020   AST 24    ALT 33   ALKPHOS 85   BILITOTAL 1.5*   ALBUMIN 2.8*       A/P   62 year old with history of AVR and ascending aortic repair in May 2016 presented with acute mental status changes found to have large moises-aortic fluid collection and multiple strokes likely septic emboli.    Neuro: tylenol for fevers, decrease sedation and Q1H neuro checks, appreciate neurology consult, obtain repeat HCT tonight to check for hemorrhagic conversion  Resp: minimal vent settings  CV: hold ASA per neuro, obtain OWEN to evaluate prosthetic heart valve for vegetations, ASA  GI/FEN: trickle tube feeds via OG today, normonatremia, bowel regimen   Renal: good urine output, no indication for diuresis today  Endo: Insulin gtt   ID: vanco and gentamicin for positive blood cultures, ID following, ortho also following for L knee effusion with ++WBC, follow up recommendations  Heme: no bleeding concerns at this time  Prophylaxis: H2 blocker, hold heparin per neuro recs  Lines: central line and arterial line from yesterday, continue  Dispo: CV-ICU    Abdoulaye Andres MD  Surgery PGY-3

## 2017-10-07 NOTE — PLAN OF CARE
Problem: Patient Care Overview  Goal: Plan of Care/Patient Progress Review  OT4A: CX: Per RN, pt not stable for therapy evaluation today, medical issues still being worked up.

## 2017-10-07 NOTE — PROGRESS NOTES
Preliminary Video EEG impression on October 7, 2017  for the first 30 minutes.  Full report to follow.    This EEG is abnormal due to the presences of continuous focal left posterior quadrant slowing, intermittent generalized polymorphic delta slowing. There is a  7-8 hz asymmetric parieto-occipital rhythm and well formed sleep architecture (vertex waves, sleep spindles, POSTS, and a mixture of delta/theta slowing) was present. This EEG is consistent with a moderate encephalopathy with cortical lesion on the left posterior quadrant. No epileptiform discharges or electrographic seizures were seen in this record. If events of interest are noted, please press the event button and complete behavioral testing (ROAR testing). Clinical correlation is advised.     Essie Burk MD  Staff Epilepsy Neurologist   [phone]

## 2017-10-07 NOTE — PHARMACY
Pharmacy Tube Feeding Consult    Medication reviewed for administration by feeding tube and for potential food/drug interactions.    Recommendation: No changes are needed at this time.     Pharmacy will continue to follow as new medications are ordered.    Myra Astudillo, JavedD

## 2017-10-07 NOTE — PROGRESS NOTES
"Neurocritical Care Progress notes    Reason for consult: encephalopathy    Impression & Recommedations:    # Cardioembolic stroke Day 2- with focal signs on exam (L gaze preference, question of less movement on R side).       MRI showed L cerebellar, L MCA and R occipital stroke- cardioembolic etiology    ECHO- Atria were not assessed.OWEN pending.    Cerebellar edema watch- Discussed with patient's family and primary team that he might need suboccpital crani and to hold ASA and heparin.    Blood cultures came back with S.Aureus.( S.Aureus IE has highest risk of bleeding, if he needs anticoagulation then he would also be a high risk of bleeding 2/2 the fairly large size of the stroke.)    Discussed with IR team about diagnostic angio to look for mycotic aneurysms- will wait till Monday as the patient tides over the cerebellar edema phase.    Neuro crit team will follow.    Patient seen & discussed w/  Neuro-ICU Staff Dr. Johnson.      HPI:   61 yo man with hx aortic valve replacement 2016 transferred to [facility] from [facility] with encephalopathy & fluid collection around aortic root and ascending aorta. Per family, patient had been feeling sick with \"flu-like symptoms\" on evening of 10/5/17. Around 8:30AM on 10/6/17, patients wife talked to him in bed and thought he seemed fine, except for the flu-like symptoms. Around 9:30 AM on 10/6 wife heard a \"thud\" and saw that patient fell out of bed. She called 911 & patient was taken to [facility] where he was intubated for airway protection. Initial CTH w/out acute pathology; CT chest/abd/pelv showed fluid collection with enhancing margin around aortic root & ascending aorta. Transferred to [facility] for further mgmt. Has been off sedation for several hours & continues to be encephalopathic, so Neurology consulted for further evaluation. Per family, patient has no history of stroke or seizures.    ROS: Negative except as stated above.    PMHx/PSHx:  Past Medical " History:   Diagnosis Date     AI (aortic insufficiency)      Aortic root dilatation (H)      AR (aortic regurgitation)     severe     Cardiomyopathy (H)      CHF (congestive heart failure), NYHA class II (H)      COPD, mild (H)     spirometry 12/16     Heart murmur      Hyperlipidemia LDL goal <70 10/31/2010     Hypertension goal BP (blood pressure) < 140/90      Inguinal hernia      left lung nodule  1/29/2008     Major depressive disorder, single episode, moderate (H)      Psoriasis childhood     Tobacco use disorder      Past Surgical History:   Procedure Laterality Date     HC SHOULDER ARTHROSCOPY, DX  2001    Arthroscopy, Shoulder RT Dr OSIRIS Andersen     HC SHOULDER ARTHROSCOPY, DX  1/04    LT arthroscopy Dr OSIRIS Andersen     HERNIA REPAIR, UMBILICAL  1/08    incarcerated - dr rockwell     HERNIORRHAPHY INGUINAL  12/21/2012    HERNIORRHAPHY INGUINAL;  Right Inguinal Hernia Repair with mesh ;  Surgeon: Mello Rockwell MD;  Location: [facility] OR     REPLACE VALVE AORTIC N/A 5/17/2016    REPLACE VALVE AORTIC - Dr Bowers     ROTATOR CUFF REPAIR RT/LT  11/10    Rt arthroscopy - Dr OSIRIS Andersen     SURGICAL HISTORY OF -   5/16    AVR, severe AR, aortic root repair       Medications:    [START ON 10/15/2017] influenza quadrivalent (PF) vacc age 3 yrs and older  0.5 mL Intramuscular Prior to discharge     [START ON 10/15/2017] pneumococcal vaccine  0.5 mL Intramuscular Prior to discharge     [START ON 10/8/2017] gentamicin  3 mg/kg (Adjusted) Intravenous Q24H     multivitamins with minerals  15 mL Per Feeding Tube Daily     nafcillin  2 g Intravenous Q4H     levofloxacin  750 mg Intravenous Q24H     sodium chloride (PF)  3 mL Intracatheter Q8H     mupirocin  0.5 g Both Nostrils BID     senna-docusate  1-2 tablet Oral BID     famotidine  20 mg Intravenous Q12H       Allergies:  Lisinopril    Social Hx: Owns [employer] business. Per family, does not consume etoh or recreational drugs.    Family Hx: Non-contributory    Exam:  BP  "117/78  Pulse 82  Temp 99.5  F (37.5  C) (Bladder)  Resp 18  Ht 1.727 m (5' 8\")  Wt 93.4 kg (205 lb 14.6 oz)  SpO2 95%  BMI 31.31 kg/m2    Gen: NAD, intubated & lying in bed   Neck: head deviated to left, difficult to overcome increased tone in neck  CV: RRR  Lungs: mechanical ventilation  Abd: soft  Ext: no pedal edema  Neuro: eyes closed, not following commands, no speech production/intubated, not awakening to noxious stimuli (both proximal & distal). Pupils 3mm & constrict to light bilaterally. L gaze preference, able to overcome w/ oculocephalic maneuver. Intact corneal relfex, face appears symmetric, intact cough reflex. Limbs w/ flaccid tone throughout. Withdraws to noxious stimulation in all 4 extremities, though possibly less movement on R side. 2+ symmetric reflexes throughout, down-going toes bilaterally.    Pertinent Data:  CT head   \"IMPRESSION: Chronic changes. No evidence for intracranial hemorrhage  or any acute process.\"    MRI Brain reviewed. L cerebellar, L.MCA and R occipital strokes        Daniel KEN  11:38 AM October 7, 2017  Vascular Neurology fellow.  "

## 2017-10-07 NOTE — PHARMACY-AMINOGLYCOSIDE DOSING SERVICE
Pharmacy Empiric Dose Change Per Policy  Original Dose Ordered: Gentamicin 3 mg/kg q12h  Dose Changed To: Gentamicin 3 mg/kg q24h (discussed with Dr. Pereira, dosing for synergy for MSSA endocarditis)  This dose change was based on the pharmacist's assessment of this patient's age, weight, concurrent drug therapy, treatment goals, whether patient's creatinine clearance adequately indicates renal function (factoring in age, muscle mass, fluid and clinical status), and, if applicable, prior pharmacokinetic data.    Creatinine Clearance= Estimated Creatinine Clearance: 78.1 mL/min (based on Cr of 1.06).  Will continue to follow and modify dosage according to levels, organ function and clinical condition

## 2017-10-07 NOTE — PHARMACY-VANCOMYCIN DOSING SERVICE
Pharmacy Vancomycin Initial Note  Date of Service 2017  Patient's  1955  62 year old, male    Indication: Abscess; concerns for endocarditis; questionable past hx of MRSA bacteremia    Current estimated CrCl = Estimated Creatinine Clearance: 77.1 mL/min (based on Cr of 1.07).    Creatinine for last 3 days  10/6/2017: 10:20 AM Creatinine 1.33 mg/dL;  1:22 PM Creatinine 1.07 mg/dL    Recent Vancomycin Level(s) for last 3 days  No results found for requested labs within last 72 hours.      Vancomycin IV Administrations (past 72 hours)                   vancomycin (VANCOCIN) 2,000 mg in NaCl 0.9 % 500 mL intermittent infusion (mg) 2,000 mg New Bag 10/06/17 1128                Nephrotoxins and other renal medications (Future)    Start     Dose/Rate Route Frequency Ordered Stop    10/07/17 0000  vancomycin (VANCOCIN) 1,750 mg in NaCl 0.9 % 500 mL intermittent infusion      1,750 mg  over 2 Hours Intravenous EVERY 12 HOURS 10/06/17 1900      10/06/17 2200  piperacillin-tazobactam (ZOSYN) 3.375 g vial to attach to  mL bag      3.375 g  over 30 Minutes Intravenous EVERY 8 HOURS SCHEDULED 10/06/17 1850      10/06/17 1900  phenylephrine (GRETEL-SYNEPHRINE) 50 mg in NaCl 0.9 % 250 mL infusion      0.5-2 mcg/kg/min × 92.9 kg  13.9-55.7 mL/hr  Intravenous CONTINUOUS 10/06/17 1850            Contrast Orders - past 72 hours (72h ago through future)    Start     Dose/Rate Route Frequency Ordered Stop    10/06/17 1526  sulfur hexafluoride microsphere (LUMASON) 60.7-25 MG injection 5 mL      5 mL Intravenous ONCE 10/06/17 1525 10/06/17 1526                Plan:  1.  Start vancomycin  2000 mg IV x1 then vancomycin 1750mg IV q12h   2.  Goal Trough Level: 15-20 mg/L   3.  Pharmacy will check trough levels as appropriate in 1-3 Days.    4. Serum creatinine levels will be ordered daily for the first week of therapy and at least twice weekly for subsequent weeks.    5. [place] method utilized to dose vancomycin  therapy: Method 2    Daniela Winter, PharmD

## 2017-10-07 NOTE — PLAN OF CARE
Problem: Sepsis/Septic Shock (Adult)  Goal: Signs and Symptoms of Listed Potential Problems Will be Absent, Minimized or Managed (Sepsis/Septic Shock)  Signs and symptoms of listed potential problems will be absent, minimized or managed by discharge/transition of care (reference Sepsis/Septic Shock (Adult) CPG).   Outcome: No Change  Patient arrived on 4A from ED this afternoon. Only withdrawing to pain, not able to follow commands or open eyes spontaneously- MD aware, NCT service consulted, MRI ordered. Patient febrile to max of 40 degrees C - MD notified, tylenol ordered - OK to use OG tube per MD verbal. Nicardipine gtt started to maintain systolic BP < 140, left a-line placed. CMV vent settings, 02 40%, peep 5. Espinoza output ~125/hr. Family updated at bedside.

## 2017-10-07 NOTE — PROGRESS NOTES
CVTS Progress Note     Dissection of aorta, unspecified portion of aorta (H)  Sepsis due to other etiology (H)  NSTEMI (non-ST elevated myocardial infarction) (H)  Hypotension, unspecified hypotension type    Subjective: no acute events since arrival, strokes identified on MRI, no intervention warranted per neurology, remained lightly sedated overnight but no purposeful movements.    Objective:  Temp:  [98.6  F (37  C)-103.8  F (39.9  C)] 100.9  F (38.3  C)  Pulse:  [82] 82  Heart Rate:  [] 85  Resp:  [11-21] 18  BP: ()/() 117/78  MAP:  [66 mmHg-124 mmHg] 85 mmHg  Arterial Line BP: ()/(54-96) 119/70  FiO2 (%):  [40 %-60 %] 40 %  SpO2:  [90 %-100 %] 97 %    Ventilation Mode: CMV/AC  FiO2 (%): 40 %  Rate Set (breaths/minute): 18 breaths/min  Tidal Volume Set (mL): 500 mL  PEEP (cm H2O): 5 cmH2O  Oxygen Concentration (%): 40 %  Resp: 18    I/O last 3 completed shifts:  In: 2017.85 [I.V.:1747.85; NG/GT:270]  Out: 1810 [Urine:1810]    PE:  General: Intubated unresponsive  Neck: Supple, no tracheal deviation  Cardiovascular: RRR  Pumonary: Non labored breathing on MV  Abdomen: Soft, non distended, non tender.   Ext: W/o edema, wwp  Neuro: no awakening to verbal or painful stimuli    BMP  Recent Labs  Lab 10/07/17  0422 10/06/17  1859 10/06/17  1322 10/06/17  1020     --  138 134   POTASSIUM 3.9 3.2* 3.4 3.2*   CHLORIDE 106  --  105 96   CO2 24  --  24 29   BUN 18  --  18 20   CR 1.06  --  1.07 1.33*   *  --  162* 130*   MAG 2.2 2.0 1.5*  --    PHOS 2.4* 1.5* 2.7  --      CBC  Recent Labs  Lab 10/07/17  0422 10/06/17  1322 10/06/17  1020   WBC 11.0 14.2* 17.1*   HGB 13.1* 13.0* 15.0   * 150 185     INR/PTT  Recent Labs  Lab 10/06/17  1322 10/06/17  1020   INR 1.25* 1.23*   PTT 32 32     ABG  Recent Labs  Lab 10/07/17  0651 10/06/17  1859 10/06/17  1327   PH 7.46* 7.48* 7.33*   PCO2 36 35 43   PO2 95 73* 103   HCO3 26 26 23     LFT  Recent Labs  Lab 10/06/17  1020   AST 24   ALT  33   ALKPHOS 85   BILITOTAL 1.5*   ALBUMIN 2.8*       A/P   62 year old with history of AVR and ascending aortic repair in May 2016 presented with acute mental status changes found to have large moises-aortic fluid collection and multiple strokes likely septic emboli.    Neuro: tylenol for fevers, decrease sedation and Q1H neuro checks, appreciate neurology consult, obtain repeat HCT tomorrow to check for hemorrhagic conversion  Resp: minimal vent settings  CV: obtain OWEN to evaluate prosthetic heart valve for vegetations, ASA  GI/FEN: trickle tube feeds via OG today, normonatremia, bowel regimen   Renal: good urine output, no indication for diuresis today  Endo: Insulin gtt   ID: vanco and gentamicin for positive blood cultures, ID following, ortho also following for L knee effusion with ++WBC, follow up recommendations  Heme: no bleeding concerns at this time  Prophylaxis: H2 blocker, heparin subcutaneous  Lines: central line and arterial line from yesterday, continue  Dispo: CV-ICU    Abdoulaye Andres MD  Surgery PGY-3

## 2017-10-07 NOTE — CONSULTS
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 Left knee pain; concern for septic arthritis; recent steroid injection; cultures pending  3. Imaging c/w right pyelonephritis  4. Multifocal acute infarctions involving the left parietotemporal lobes, left cerebellar hemisphere, and right occipital lobe  5. Ongoing critical illness with mechanical ventilation    RECOMMENDATIONS:   1. Discontinue Vancomycin; start Nafcillin 2g IV Q4 hours and Levofloxacin 750mg IV daily (better CNS penetration)  2. Continue Gentamicin 3mg/kg IV daily; pharmacy to assist in dosing and tracking levels  3. Daily blood cultures  4. Monitor renal function closely given use of vancomycin and gentamicin  5. UA/UC given CT findings concerning for right pyelonephritis  6. Appreciate Orthopedics consultation; will follow joint aspiration cultures  7. Repeat EKG after initiation of levofloxacin to assess QTc (current is 454)    DISCUSSION:   62 year old male with a history of severe severe aortic valve insufficiency and a 5 cm aortic root aneurysm and severe single vessel coronary artery disease involving an obtuse marginal branch, s/p Bentall procedure (aortic valve replacement and aortic root replacement with a composite graft constructed using a 27 mm St. Isidro Trifecta (bovine pericardial) valve and a 30 mm Mckenzie Cardioroot Valsalva tube graft), endoscopic vein harvest from the left lower extremity (reversed saphenous vein graft to OM) on 16, here for an evaluation of a fall and altered mental status, and evidence of  sepsis.     Given the findings of a fluid collection around the aortic root and ascending thoracic aorta in the setting of a prosthetic heart valve, concern for infective endocarditis is high. Late onset prosthetic valve endocarditis typically involves Staphylococcus aureus, Staphylococcus epidermidis, Viridans streptococcus, and Enterococcal spp. Appropriate empiric treatment for prosthetic valve endocarditis involves vancomycin + gentamicin; rifampin may also be used when the organism is identified and proven susceptible and when the bacterial burden has been reduced (low threshold for resistance development with high bacterial load). Blood cultures are now growing MSSA. With this new information, recommend discontinuing the empiric vancomycin and switching to nafcillin 2g IV Q4 hours. Areas of concern for the source of IE include the left knee (especially given his recent steroid injection) and urinary/renal due to imaging suggestive of possible right pyelonephritis, although these could be secondary sites as well. 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 To improve CNS coverage for MSSA, would add levofloxacin. Overall prognosis is guarded.   ID will continue to follow  PINO Pereira M.D.  [facility] ID Service Staff  [phone]     HPI: (Extended HPI: Requires four HPI elements or the status of three chronic problems)  62 year old male with a history of severe severe aortic valve insufficiency and a 5 cm aortic root aneurysm and severe single vessel coronary artery disease involving an obtuse marginal branch. He underwent a Bentall procedure (aortic valve replacement and aortic root replacement with a composite graft constructed using a 27 mm St. Isidro Trifecta (bovine pericardial) valve and a 30 mm Mckenzie Cardioroot Valsalva tube graft), endoscopic vein harvest from the left lower extremity (reversed saphenous vein graft to OM) on 5/17/16.  He was discharged from the hospital with no  complications nor signs of infection.  Prior to this surgery, he had had an episode of bacteremia that was diagnosed in [place] and [place] (he was traveling via  at the time with his wife). His wife recalls that it was staphylococcus aureus that was the issue. At the time, the source of the bacteremia was unknown. It was through this work-up that his aortic insufficiency was discovered.      He presented to the [facility] ED for evaluation of a fall and altered mental status. Per EMS, the patient was discovered by wife this morning after lying face down after she heard a loud thud. Per his wife, he had been complaining of joint pain, minor fever 99.4 and not feeling well for the past 24 hours.  He denied any sick contacts. He did have his left knee tapped followed by a cortisol injection last month at an orthopaedic clinic, which they claim did not show any signs of infection. Wife states that he had some relief for a week or two, but then the pain and swelling returned.       Per the EMS records, the patient was not responding for the first 20 minutes; the patient was only groaning. Afterwards, the patient became more aggravated and became incontinent. The patient was given one bag of fluids during transfer. During transport, the patient s vitals were: 120 heart rate, 133 blood glucose, and 94% oxygen saturation on room air. He was intubated by EMS due to his confusion and respiratory distress.  He was also given a dose of vancomycin and Zosyn en route due to leukocytosis.  Blood cultures were drawn.      A head/neck/chest/abd/pelv CT's were obtained:     IMPRESSION:  1. Fluid collection with enhancing margin around the aortic root and  ascending thoracic aorta possibly postsurgical hematoma, dissection or  infected fluid collection. A discrete dissection flap is not  definitely identified.  2. Probable right pyelonephritis with decreased cortical enhancement  posterior mid right kidney. Kidneys are otherwise  unremarkable.  Remaining abdominal and pelvic organs are within normal limits. No  diverticulitis or appendicitis. A few scattered liver cysts are  present of doubtful clinical significance.  3. No evidence of pneumonia or effusion. Changes of old granulomatous  disease are noted.  4. No evidence of intra-abdominal or pelvic abscess.     He was transferred from [facility] for further evaluation.  CT surgery was consulted for the concerns for infectious source. Neurology has been consulted for the AMS. Orthopedics was consulted for evaluation of his left knee as a possible source of infection; tap performed 10/6. Indicies show:      Ref. Range 10/6/2017 18:15   Color Fluid Unknown Slightly Bloody   Appearance Fluid Unknown Cloudy   RBC Fluid Latest Units: /uL << Do Not Report >>   WBC Fluid Latest Units: /uL 3897   % Mono/Macro Fluid Latest Units: % 3   % Neutrophils Fluid Latest Units: % 97     Cardiac ECHO confirmed fluid collection. Per ID recommendation, antibiotics were switched to vancomycin and gentamicin for empiric coverage of prosthetic valve endocarditis. Blood cultures x 2 now growing MSSA. Fever to 103 today. WBC elevated, but improved today. . MRSA screen negative.    MRI Brain on 10/6/17 showed:    PRELIMINARY REPORT - The following report is a preliminary  interpretation.      1. Multifocal acute infarctions involving the left parietotemporal  lobes, left cerebellar hemisphere, and right occipital lobe.  2. No abnormal enhancing lesions intracranially.  3. Head MRA demonstrates no definite aneurysm or stenosis of the major  intracranial arteries.  4. Neck MRA demonstrates patent major cervical arteries.     All available records were reviewed and summarized above.  This ID consultation question represents a new problem, with additional work-up planned and described in the recommendations section above.    ANTI-INFECTIVES:   Current: Vancomycin (10/6-), Gentamicin (10/6-)  Previous: Zosyn  (10/6)  Other current medications were reviewed with an eye on potential drug-drug interactions.    ROS: (Recommend ? 10 systems)   A ten point review of systems was not obtained due to intubation  PMH: (At least ONE specific item from THREE of the three components of PFSH must be documented for a Complete PFSH)  Past Medical History:   Diagnosis Date     AI (aortic insufficiency)      Aortic root dilatation (H)      AR (aortic regurgitation)     severe     Cardiomyopathy (H)      CHF (congestive heart failure), NYHA class II (H)      COPD, mild (H)     spirometry 12/16     Heart murmur      Hyperlipidemia LDL goal <70 10/31/2010     Hypertension goal BP (blood pressure) < 140/90      Inguinal hernia      left lung nodule  1/29/2008     Major depressive disorder, single episode, moderate (H)      Psoriasis childhood     Tobacco use disorder      Past Surgical History:   Procedure Laterality Date     HC SHOULDER ARTHROSCOPY, DX  2001    Arthroscopy, Shoulder RT Dr OSIRIS Andersen     HC SHOULDER ARTHROSCOPY, DX  1/04    LT arthroscopy Dr OSIRIS Andersen     HERNIA REPAIR, UMBILICAL  1/08    incarcerated - dr rockwell     HERNIORRHAPHY INGUINAL  12/21/2012    HERNIORRHAPHY INGUINAL;  Right Inguinal Hernia Repair with mesh ;  Surgeon: Mello Rockwell MD;  Location: [facility] OR     REPLACE VALVE AORTIC N/A 5/17/2016    REPLACE VALVE AORTIC - Dr Bowers     ROTATOR CUFF REPAIR RT/LT  11/10    Rt arthroscopy - Dr OSIRIS Andersen     SURGICAL HISTORY OF -   5/16    AVR, severe AR, aortic root repair       Recent left knee tap with steroid injection  Medication allergies were reviewed. Notable allergies include: Lisinipril  SOCIAL HISTORY AND RISK FACTORS   Social History   Substance Use Topics     Smoking status: Former Smoker     Packs/day: 1.00     Years: 35.00     Quit date: 4/1/2016     Smokeless tobacco: Never Used      Comment: 4/2016     Alcohol use 0.0 oz/week     0 Standard drinks or equivalent per week      Comment: 1 drink per week  "avg, seldom     History   Sexual Activity     Sexual activity: Yes     Partners: Female     Lives with wife in [place], [place]; daughter in area. H/o tobacco used; quit in 4/2016  FAMILY HISTORY:   Reviewed and non-contributory  Family History   Problem Relation Age of Onset     Genetic Disorder Mother      Bone plateover growth Agustin. shoulder surgeries     CANCER Mother      brain cancer     Alzheimer Disease Father        EXAMINATION: (Recommend ? 9 systems; 2 items each)   /78  Pulse 82  Temp 101.3  F (38.5  C)  Resp 18  Ht 1.727 m (5' 8\")  Wt 93.4 kg (205 lb 14.6 oz)  SpO2 97%  BMI 31.31 kg/m2  GENERAL:  well-developed, well-nourished, in bed in no acute distress, unconscious, intubated   EYES:  Eyes have anicteric sclerae without conjunctival injection   ENT:  Atraumatic. oral intubation  NECK:  Supple. No cervical lymphadenopathy; right IJ C/D/I  LUNGS:  Clear to auscultation bilateral. Respiratory effort is normal  CARDIOVASCULAR:  Regular rate and rhythm with click noted  ABDOMEN:  Normal bowel sounds, soft, nontender. No appreciable hepatosplenomegaly  SKIN:  No acute rashes; lower extremities have a mottled appearance.  Line(s) are in place without any surrounding erythema or exudate. No stigmata of endocarditis.  NEUROLOGIC:  Unarousable; no movement; pinpoint pupils  PSYCH: Unable to assess  CURRENT LINES: Right IJ, PICC  RELEVANT DATA:   Reviewed medicine test (PFTs, EKG, cardiac echo or cath): YES ECHO  ECHO: There is significant fluid collection on both sides of the ascending aorta  originating from aortic root. No clear communication noted between the aorta  and the echo free space around the aorta with contrast study or color Doppler.  BASIC LABS   The following basic labs were personally reviewed:  Inflammatory Markers    Recent Labs   Lab Test  10/06/17   1859  05/06/13   1703  02/02/11   1506   SED   --   6  8   CRP  310.0*  <5.0  9.6*       Hematology Studies    Recent Labs   Lab Test "  10/07/17   0422  10/06/17   1322  10/06/17   1020  06/03/16   1716  05/22/16   0740  05/20/16   0741   05/06/13   1703  12/04/12   1016  02/02/11   1506   11/06/09   1155  11/02/09   1717   WBC  11.0  14.2*  17.1*  11.2*  11.6*  11.0   < >  10.6  11.9*  11.9*   < >  9.2  12.1*   ANEU   --    --   15.7*   --    --    --    --   7.2  7.9  8.2   --   5.9  8.3   AEOS   --    --   0.0   --    --    --    --   0.0  0.4  0.3   --   0.3  0.3   HGB  13.1*  13.0*  15.0  11.0*  9.0*  8.2*   < >  15.8  16.2  16.0   < >  16.1  15.9   MCV  90  92  88  98  92  90   < >  88  89  89   < >  89  89   PLT  132*  150  185  353  208  129*   < >  315  259  315   < >  303  312    < > = values in this interval not displayed.       Immune Globulin Studies  No lab results found.    Metabolic Studies     Recent Labs   Lab Test  10/07/17   0422  10/06/17   1859  10/06/17   1322  10/06/17   1020  06/03/16   1716  05/22/16   0740   NA  138   --   138  134  141  139   POTASSIUM  3.9  3.2*  3.4  3.2*  4.9  3.7   CHLORIDE  106   --   105  96  106  102   CO2  24   --   24  29  30  28   BUN  18   --   18  20  20  21   CR  1.06   --   1.07  1.33*  1.03  1.04   GFRESTIMATED  70   --   69  54*  73  72       Hepatic Studies    Recent Labs   Lab Test  10/06/17   1020  06/03/16   1716  04/26/16   0654 04/08/16 04/02/16 12/04/12   1016  02/07/12   1214  10/25/10   1157   BILITOTAL  1.5*  0.3  0.6   --    --   0.4  0.6  0.4   ALKPHOS  85  104  91   --    --   85  83  68   ALBUMIN  2.8*  3.4  3.1*   --    --   4.0  4.2  4.0   AST  24  19  20  39  56  41  38  22   ALT  33  32  26  56  66  54  34  26       Thyroid Studies    Recent Labs   Lab Test 04/03/16 02/07/12   1214   TSH  0.945  1.14       MICROBIOLOGY LABS  The following microbiology studies were personally reviewed:  Culture Micro   Date Value Ref Range Status   10/06/2017 PENDING  Preliminary   10/06/2017 PENDING  Preliminary   10/06/2017 (A)  Preliminary    Cultured on the 1st day of  incubation:  Gram positive cocci in clusters     10/06/2017   Preliminary    Critical Value/Significant Value, preliminary result only, called to and read back by  Yarely Hull RN, @ 8077 10.06.2017 BL     10/06/2017 (A)  Preliminary    Cultured on the 1st day of incubation:  Gram positive cocci in clusters     10/06/2017   Preliminary    Critical Value/Significant Value, preliminary result only, called to and read back by  Yarely Hull RN on 10/6/2017 @2024, tk     10/06/2017   Preliminary    (Note)  POSITIVE for STAPHYLOCOCCUS AUREUS and NEGATIVE for the mecA gene  (not MRSA) by [employer] multiplex nucleic acid test. The mecA gene was  not detected. Final identification and antimicrobial susceptibility  testing will be verified by standard methods.    Specimen tested with Verigene multiplex, gram-positive blood culture  nucleic acid test for the following targets: Staph aureus, Staph  epidermidis, Staph lugdunensis, other Staph species, Enterococcus  faecalis, Enterococcus faecium, Streptococcus species, S. agalactiae,  S. anginosus grp., S. pneumoniae, S. pyogenes, Listeria sp., mecA  (methicillin resistance) and Yvan/B (vancomycin resistance).    Critical Value/Significant Value called to and read back by Yarely Hull RN, @ 5710 10.06.2017 BL     04/25/2016 No growth  Final   04/25/2016 No growth  Final   10/02/2002 No growth  Final       Urine Studies    Recent Labs   Lab Test  10/06/17   1115  05/03/16   1110  12/04/12   1016  10/25/10   1350   LEUKEST  Negative  Negative  Negative  Negative   WBCU  <1  0  O - 2  O - 2       IMAGING RESULTS  The following imaging studies were independently reviewed:    MRI Brain on 10/6/17:    PRELIMINARY REPORT - The following report is a preliminary  interpretation.      1. Multifocal acute infarctions involving the left parietotemporal  lobes, left cerebellar hemisphere, and right occipital lobe.  2. No abnormal enhancing lesions intracranially.  3. Head MRA  demonstrates no definite aneurysm or stenosis of the major  intracranial arteries.  4. Neck MRA demonstrates patent major cervical arteries.    10/6/17 CT Head/Chest/ABD/P  IMPRESSION:  1. Fluid collection with enhancing margin around the aortic root and  ascending thoracic aorta possibly postsurgical hematoma, dissection or  infected fluid collection. A discrete dissection flap is not  definitely identified.  2. Probable right pyelonephritis with decreased cortical enhancement  posterior mid right kidney. Kidneys are otherwise unremarkable.  Remaining abdominal and pelvic organs are within normal limits. No  diverticulitis or appendicitis. A few scattered liver cysts are  present of doubtful clinical significance.  3. No evidence of pneumonia or effusion. Changes of old granulomatous  disease are noted.  4. No evidence of intra-abdominal or pelvic abscess.

## 2017-10-07 NOTE — PLAN OF CARE
Problem: Stroke (Ischemic) (Adult)  Goal: Signs and Symptoms of Listed Potential Problems Will be Absent, Minimized or Managed (Stroke)  Signs and symptoms of listed potential problems will be absent, minimized or managed by discharge/transition of care (reference Stroke (Ischemic) (Adult) CPG).  Outcome: No Change  Pt down to MRI at start of shift, strokes noted. Blood cultures noted for staph.   Neuro: withdraws on Bilateral Right extremities. Moves bilateral left extremities spontaneously. Propofol and fentanyl gtts. EEG will be hooked up this AM.   Cardiac: SBP<140, nicardipine off.   Temp: Temp high t/o night, tylenol given every four hours. Consider cooling blanket.   Resp: Minimal vent settings, cough with suction.   Espinoza in place with adequate output. Cloudy in appearance, UA/UC?   NG confirmed, insulin gtt at minimal rate.   Lytes: K replaced PO, Phos hung   SO updated on findings and plan.   Will continue to monitor and notify MD of any acute concerns.

## 2017-10-07 NOTE — PHARMACY-AMINOGLYCOSIDE DOSING SERVICE
Pharmacy Aminoglycoside Initial Note  Date of Service 2017  Patient's  1955  62 year old, male    Weight (Adjusted):  92.9 kg    Indication: Endocarditis    Current estimated CrCl = Estimated Creatinine Clearance: 77.1 mL/min (based on Cr of 1.07).    Creatinine for last 3 days  10/6/2017: 10:20 AM Creatinine 1.33 mg/dL;  1:22 PM Creatinine 1.07 mg/dL     Nephrotoxins and other renal medications (Future)    Start     Dose/Rate Route Frequency Ordered Stop    10/07/17 0000  vancomycin (VANCOCIN) 1,750 mg in NaCl 0.9 % 500 mL intermittent infusion      1,750 mg  over 2 Hours Intravenous EVERY 12 HOURS 10/06/17 1902      10/06/17 2030  gentamicin (GARAMYCIN) 230 mg in NaCl 0.9 % 50 mL intermittent infusion      3 mg/kg × 78.2 kg (Adjusted)  over 60 Minutes Intravenous EVERY 12 HOURS 10/06/17 2026      10/06/17 1900  phenylephrine (GRETEL-SYNEPHRINE) 50 mg in NaCl 0.9 % 250 mL infusion      0.5-2 mcg/kg/min × 92.9 kg  13.9-55.7 mL/hr  Intravenous CONTINUOUS 10/06/17 1850            Contrast Orders - past 72 hours (72h ago through future)    Start     Dose/Rate Route Frequency Ordered Stop    10/06/17 1526  sulfur hexafluoride microsphere (LUMASON) 60.7-25 MG injection 5 mL      5 mL Intravenous ONCE 10/06/17 1525 10/06/17 1526          Aminoglycoside Levels - past 2 days  No results found for requested labs within last 48 hours.    Aminoglycosides IV Administrations (past 72 hours)      No aminoglycosides orders with administrations in past 72 hours.                    Plan:  1.  Start Gentamicin 230 mg (3 mg/kg) IV q12h.   2.  Target goals based on conventional dosing  3.  Goal peak level: 10-12 mg/L  4.  Goal trough level: <1 mg/L  5.  Pharmacy will continue to follow and check levels as appropriate in 1-3 Days      Daniela Winter, JavedD

## 2017-10-07 NOTE — PHARMACY-CONSULT NOTE
Pharmacy Tube Feeding Consult    Medication reviewed for administration by feeding tube and for potential food/drug interactions.    Recommendation: No changes are needed at this time.     Pharmacy will continue to follow as new medications are ordered.    Linda Treviño, PharmD, BCPS  October 7, 2017

## 2017-10-07 NOTE — PLAN OF CARE
Problem: Restraint for Non-Violent/Non-Self-Destructive Behavior  Goal: Prevent/Manage Potential Problems  Maintain safety of patient and others during period of restraint.  Promote psychological and physical wellbeing.  Prevent injury to skin and involved body parts.  Promote nutrition, hydration, and elimination.   Outcome: No Change  Restraints continued d/t patient pulling at lines/tubing.

## 2017-10-07 NOTE — PROGRESS NOTES
CLINICAL NUTRITION SERVICES - ASSESSMENT NOTE     Nutrition Prescription    RECOMMENDATIONS FOR MDs/PROVIDERS TO ORDER:  Continue to Monitor K+/Mg++/Phos ordered daily with TF start and advancement to goal infusion to evaluate for ongoing signs of refeeding with nutrition received.     Malnutrition Status:     Patient does not meet two of the above criteria necessary for diagnosing malnutrition but is at risk for malnutrition    Recommendations already ordered by Registered Dietitian (RD):  - TF route: OG tube  - Dosing wt: 76 kg adjusted wt     - Start impact peptide 1.5 @ 10 ml/hr (no advancement at this time) -   - Impact Peptide @ 10 ml/hr (240 ml/day) to provide 360 kcals (5 kcal/kg/day), 23 gm PRO (0.3 gm/kg/day), 185 ml free H2O, 15 gm Fat (50% from MCTs), 34 gm CHO and no Fiber daily.    - 30 ml H2O flush q 4 hrs for tube patency  - Certavite 15 ml daily  - K+, phos, Mg++ levels daily with TF advancement  - Baseline (CRP) and (prealbumin), with Recheck every Monday to evaluate trend.     Future/Additional Recommendations:  2. Goal TF: Continue to advance by 10 ml every 8 hours ( ONLY advance if K+/mg++ WNL and Phos >1.9) to goal @ 50 ml/hr.     - Impact Peptide @ goal 50 ml/hr (1200 ml/day) to provide 1800 kcals ( 24  kcal/kg/day), 113 gm PRO ( 1.5 gm/kg/day), 924 ml free H2O, 77 gm Fat (50% from MCTs), 168 gm CHO and no Fiber daily.       REASON FOR ASSESSMENT  Cricket Miguel is a/an 62 year old male assessed by the dietitian for Provider Order - Registered Dietitian to Assess and Order TF per Medical Nutrition Therapy Protocol, Feed via OG, trickle feeds only, and education Post Op Cardiovascular surgery.     Chart reviewed:   - Pt with history of Aortic Valve Replacement  and ascending aortic repair in May 2016.    - Transferred to [facility] from [facility] with encephalopathy, & Found to have large fluid collection around aortic root and ascending aorta and multiple strokes likely septic emboli.   "    - Patient had been feeling sick with \"flu-like symptoms\" on evening of 10/5/17.   - On 10/6/17, patients was taken to [facility] where he was intubated for airway protection.    - Transferred to [facility] for further mgmt.    NUTRITION HISTORY  Unable to obtain. Pt is sedated/intubated.     CURRENT NUTRITION ORDERS  Diet: NPO  OG tube in place for TF: per MD, trickle tube feeds via OG today    LABS as of 10/7 am:   K+: 3.9, Mg++: 2.2, Phos: 2.4 -  CRP: 310 (H)  BUN/Cr: normal range       MEDICATIONS   Insulin gtt, IV  D5 IVF @ 50 ml/hr  K+/Mg++/phos replacement protocol  Antibiotics for positive culturea    ANTHROPOMETRICS  Height: 172.7 cm (5' 8\")  Admit Weight: 92.9 kg (204 lb 12.9 oz) on 9/6/17 ( dry wt)  IBW: 70 kg ( 133% IBW)  BMI:31.21 kg /m2 -  Obesity Grade I BMI 30-34.9  Weight History:   Wt Readings from Last 10 Encounters:   10/07/17 93.4 kg (205 lb 14.6 oz)   02/06/17 91.6 kg (202 lb)   01/09/17 87.5 kg (193 lb)   12/20/16 87.1 kg (192 lb)   11/30/16 89.4 kg (197 lb)   08/04/16 83.7 kg (184 lb 9.6 oz)   07/27/16 84.5 kg (186 lb 3.2 oz)   07/06/16 83 kg (183 lb)   06/29/16 85 kg (187 lb 6.4 oz)   06/17/16 84.8 kg (187 lb)       Dosing Weight: 76 kg adjusted wt from dry admission wt    ASSESSED NUTRITION NEEDS  Estimated Energy Needs: 1520 - 1900  kcals/day (20 - 25 kcals/kg)  Justification: Obese and Vented  Estimated Protein Needs: 91 - 114  grams protein/day (1.2 - 1.5 grams of pro/kg)  Justification: Repletion  Estimated Fluid Needs:(1 mL/kcal)   Justification: Maintenance    PHYSICAL FINDINGS  Ext: W/o edema, wwp    MALNUTRITION  % Intake: 2-3 days of NPO  % Weight Loss: None noted  Subcutaneous Fat Loss: None observed  Muscle Loss: None observed  Fluid Accumulation/Edema: None noted  Malnutrition Diagnosis: Patient does not meet two of the above criteria necessary for diagnosing malnutrition but is at risk for malnutrition    NUTRITION DIAGNOSIS  Inadequate oral intake related to resp status " inhibiting ability to take PO as evidenced by pt NPO x 2-3 days, with plan to start TF support today.     INTERVENTIONS  Implementation  Nutrition Education: Not appropriate at this time due to patient condition   Enteral Nutrition - Initiate  Feeding tube flush - Ordered     Goals  Total avg nutritional intake to meet a minimum of 20 kcal/kg and 1.2 gm PRO/kg daily (per dosing wt 76 kg adjusted wt ).     Monitoring/Evaluation  Progress toward goals will be monitored and evaluated per protocol.    Janice Wynne RD/SHERYL  Weekend Pager [phone]

## 2017-10-07 NOTE — MR AVS SNAPSHOT
After Visit Summary   10/7/2017    Cricket Miguel    MRN: [id number]           Patient Information     Date Of Birth          1955        Visit Information        Provider Department      10/7/2017 11:00 AM [facility] EEG TECH 4 [facility]        Today's Diagnoses     Encephalopathy    -  1       Follow-ups after your visit        Your next 10 appointments already scheduled     Oct 07, 2017  8:00 PM CDT   CT HEAD W/O CONTRAST with UUCT1   [facility], [place], [place] ([facility])    [address] [facility] [id number] [phone]           Please bring any scans or X-rays taken at other hospitals, if similar tests were done. Also bring a list of your medicines, including vitamins, minerals and over-the-counter drugs. It is safest to leave personal items at home.  Be sure to tell your doctor:   If you have any allergies.   If there s any chance you are pregnant.   If you are breastfeeding.   If you have any special needs.  You do not need to do anything special to prepare.  Please wear loose clothing, such as a sweat suit or jogging clothes. Avoid snaps, zippers and other metal. We may ask you to undress and put on a hospital gown.            Oct 08, 2017  7:00 AM CDT   24 Hour Video Visit with [facility] EEG TECH 4   [facility] EEG ([facility])    [address] [id number] [phone]           [place]: Your appointment is scheduled at [facility]. [address] [place] [address]              Future tests that were ordered for you today     Open Standing Orders        Priority Remaining Interval Expires Ordered    Blood gas arterial Routine 16/16 DAILY  10/7/2017    Basic metabolic panel Routine 16/16 DAILY  10/7/2017    CBC with platelets Routine 16/16 DAILY  10/7/2017    INR Routine 1/1 DAILY  10/7/2017    Troponin I Routine 1/1 AM DRAW  10/7/2017     Magnesium Routine 2/2 DAILY  10/7/2017    Phosphorus Routine 2/2 DAILY  10/7/2017    Prealbumin Routine 3/3 EVERY MONDAY  10/7/2017    CRP inflammation Routine 3/3 EVERY MONDAY  10/7/2017    Daily weights Routine 1/1 DAILY  10/7/2017    Daily Vent Wean per protocol (Weaning parameters RT) Routine 15/16 DAILY RT  10/6/2017    Notify Physician - Diabetes - IF patient is on TPN or tube feeding which is held or cycled and patient on continuous insulin infusion.  Routine [id number]/81807 PRN  10/6/2017    Notify Physician - Diabetes - when blood glucose has stabilized and patient is tolerating PO intake, Call MD for transition to SQ insulin. Routine 89760/07294 PRN  10/6/2017    Glucose monitor nursing POCT Routine [id number]/[id number] PRN  10/6/2017    Glucose monitor nursing POCT Routine 53240/41987 PRN  10/6/2017    Notify Provider Routine [id number] PRN  10/6/2017    Glucose - Diabetes STAT 1/1 CONDITIONAL X 1 STAT  10/6/2017    EKG 12-lead, tracing only STAT 10/10 CONDITIONAL (SPECIFY)  10/6/2017    Oxygen: Nasal cannula, Oxygen mask Routine 15963/43506 CONTINUOUS  10/6/2017    Platelet count Routine 6/6 EVERY THREE DAYS  10/6/2017    Notify Provider Routine 45790/26603 PRN  10/6/2017    Potassium Routine 99/100 CONDITIONAL (SPECIFY)  10/6/2017    Phosphorus Routine 99/100 CONDITIONAL (SPECIFY)  10/6/2017    Magnesium Routine 99/100 CONDITIONAL (SPECIFY)  10/6/2017    Hemoglobin Routine 100/100 CONDITIONAL (SPECIFY)  10/6/2017    Blood gas arterial and oxyhgb Routine 100/100 CONDITIONAL (SPECIFY)  10/6/2017    XR Chest 2 Views Routine 1/1 CONDITIONAL X 1  10/6/2017            Who to contact     Please call your clinic at [phone] to:    Ask questions about your health    Make or cancel appointments    Discuss your medicines    Learn about your test results    Speak to your doctor   If you have compliments or concerns about an experience at your clinic, or if you wish to file a complaint, please contact [facility]  [place] [facility] Patient Relations at [phone] or email us at [email or web address]         Additional Information About Your Visit        MyChart Information     The [employer] gives you secure access to your electronic health record. If you see a primary care provider, you can also send messages to your care team and make appointments. If you have questions, please call your primary care clinic.  If you do not have a primary care provider, please call [phone] and they will assist you.      [employer] is an electronic gateway that provides easy, online access to your medical records. With [employer], you can request a clinic appointment, read your test results, renew a prescription or communicate with your care team.     To access your existing account, please contact your [facility] or call [phone] for assistance.        Care EveryWhere ID     This is your Care EveryWhere ID. This could be used by other organizations to access your [place] medical records  [id number]         Blood Pressure from Last 3 Encounters:   10/06/17 117/78   10/06/17 125/86   02/06/17 132/84    Weight from Last 3 Encounters:   10/07/17 93.4 kg (205 lb 14.6 oz)   02/06/17 91.6 kg (202 lb)   01/09/17 87.5 kg (193 lb)              We Performed the Following     Glucose by meter          Today's Medication Changes      Notice     This visit is during an admission. Changes to the med list made in this visit will be reflected in the After Visit Summary of the admission.             Primary Care Provider Office Phone # Fax #    Dipak Gordillo [phone] [address] [facility] [facility] [address]        Equal Access to Services     OLIVERIO BILLS : Hadliang Almodovar, wanico luqamita, toya usalfreddie mcdonnell. So [facility] [phone].    ATENCIÓN: Si habla español, tiene a mendiola disposición servicios gratuitos de asistencia  lingüísticaAmmon Ghotra al [phone].    We comply with applicable federal civil rights laws and [place] laws. We do not discriminate on the basis of race, color, national origin, age, disability, sex, sexual orientation, or gender identity.            Thank you!     Thank you for choosing [facility]  for your care. Our goal is always to provide you with excellent care. Hearing back from our patients is one way we can continue to improve our services. Please take a few minutes to complete the written survey that you may receive in the mail after your visit with us. Thank you!             Your Updated Medication List - Protect others around you: Learn how to safely use, store and throw away your medicines at www.disposemymeds.org.      Notice     This visit is during an admission. Changes to the med list made in this visit will be reflected in the After Visit Summary of the admission.

## 2017-10-08 ENCOUNTER — APPOINTMENT (OUTPATIENT)
Dept: CT IMAGING | Facility: CLINIC | Age: 62
DRG: 004 | End: 2017-10-08
Attending: THORACIC SURGERY (CARDIOTHORACIC VASCULAR SURGERY)
Payer: COMMERCIAL

## 2017-10-08 ENCOUNTER — ANESTHESIA (OUTPATIENT)
Dept: SURGERY | Facility: CLINIC | Age: 62
DRG: 004 | End: 2017-10-08
Payer: COMMERCIAL

## 2017-10-08 ENCOUNTER — ALLIED HEALTH/NURSE VISIT (OUTPATIENT)
Dept: NEUROLOGY | Facility: CLINIC | Age: 62
DRG: 004 | End: 2017-10-08
Attending: PSYCHIATRY & NEUROLOGY
Payer: COMMERCIAL

## 2017-10-08 DIAGNOSIS — R56.9 SEIZURES (H): Primary | ICD-10-CM

## 2017-10-08 LAB
ANION GAP SERPL CALCULATED.3IONS-SCNC: 10 MMOL/L (ref 3–14)
BACTERIA SPEC CULT: ABNORMAL
BASE EXCESS BLDA CALC-SCNC: 2.9 MMOL/L
BASE EXCESS BLDA CALC-SCNC: 4.3 MMOL/L
BUN SERPL-MCNC: 14 MG/DL (ref 7–30)
CALCIUM SERPL-MCNC: 8.5 MG/DL (ref 8.5–10.1)
CHLORIDE SERPL-SCNC: 106 MMOL/L (ref 94–109)
CO2 SERPL-SCNC: 26 MMOL/L (ref 20–32)
CREAT SERPL-MCNC: 1.1 MG/DL (ref 0.66–1.25)
ERYTHROCYTE [DISTWIDTH] IN BLOOD BY AUTOMATED COUNT: 14.6 % (ref 10–15)
GFR SERPL CREATININE-BSD FRML MDRD: 67 ML/MIN/1.7M2
GLUCOSE BLDC GLUCOMTR-MCNC: 105 MG/DL (ref 70–99)
GLUCOSE BLDC GLUCOMTR-MCNC: 106 MG/DL (ref 70–99)
GLUCOSE BLDC GLUCOMTR-MCNC: 106 MG/DL (ref 70–99)
GLUCOSE BLDC GLUCOMTR-MCNC: 107 MG/DL (ref 70–99)
GLUCOSE BLDC GLUCOMTR-MCNC: 109 MG/DL (ref 70–99)
GLUCOSE BLDC GLUCOMTR-MCNC: 110 MG/DL (ref 70–99)
GLUCOSE BLDC GLUCOMTR-MCNC: 121 MG/DL (ref 70–99)
GLUCOSE BLDC GLUCOMTR-MCNC: 122 MG/DL (ref 70–99)
GLUCOSE BLDC GLUCOMTR-MCNC: 124 MG/DL (ref 70–99)
GLUCOSE BLDC GLUCOMTR-MCNC: 127 MG/DL (ref 70–99)
GLUCOSE BLDC GLUCOMTR-MCNC: 128 MG/DL (ref 70–99)
GLUCOSE BLDC GLUCOMTR-MCNC: 140 MG/DL (ref 70–99)
GLUCOSE BLDC GLUCOMTR-MCNC: 99 MG/DL (ref 70–99)
GLUCOSE SERPL-MCNC: 123 MG/DL (ref 70–99)
GRAM STN SPEC: NORMAL
GRAM STN SPEC: NORMAL
HCO3 BLD-SCNC: 27 MMOL/L (ref 21–28)
HCO3 BLD-SCNC: 27 MMOL/L (ref 21–28)
HCT VFR BLD AUTO: 39.9 % (ref 40–53)
HGB BLD-MCNC: 13.3 G/DL (ref 13.3–17.7)
INR PPP: 1.03 (ref 0.86–1.14)
Lab: ABNORMAL
Lab: ABNORMAL
MAGNESIUM SERPL-MCNC: 2.1 MG/DL (ref 1.6–2.3)
MCH RBC QN AUTO: 29.9 PG (ref 26.5–33)
MCHC RBC AUTO-ENTMCNC: 33.3 G/DL (ref 31.5–36.5)
MCV RBC AUTO: 90 FL (ref 78–100)
O2/TOTAL GAS SETTING VFR VENT: 40 %
O2/TOTAL GAS SETTING VFR VENT: 40 %
OXYHGB MFR BLD: 95 % (ref 92–100)
PCO2 BLD: 35 MM HG (ref 35–45)
PCO2 BLD: 38 MM HG (ref 35–45)
PH BLD: 7.46 PH (ref 7.35–7.45)
PH BLD: 7.5 PH (ref 7.35–7.45)
PHOSPHATE SERPL-MCNC: 2.4 MG/DL (ref 2.5–4.5)
PLATELET # BLD AUTO: 138 10E9/L (ref 150–450)
PO2 BLD: 83 MM HG (ref 80–105)
PO2 BLD: 88 MM HG (ref 80–105)
POTASSIUM SERPL-SCNC: 3.4 MMOL/L (ref 3.4–5.3)
RBC # BLD AUTO: 4.45 10E12/L (ref 4.4–5.9)
SODIUM SERPL-SCNC: 141 MMOL/L (ref 133–144)
SPECIMEN SOURCE: ABNORMAL
SPECIMEN SOURCE: ABNORMAL
SPECIMEN SOURCE: NORMAL
TROPONIN I SERPL-MCNC: 0.63 UG/L (ref 0–0.04)
WBC # BLD AUTO: 11.8 10E9/L (ref 4–11)

## 2017-10-08 PROCEDURE — 93005 ELECTROCARDIOGRAM TRACING: CPT

## 2017-10-08 PROCEDURE — S0032 INJECTION, NAFCILLIN SODIUM: HCPCS | Performed by: SURGERY

## 2017-10-08 PROCEDURE — 85027 COMPLETE CBC AUTOMATED: CPT | Performed by: SURGERY

## 2017-10-08 PROCEDURE — 87070 CULTURE OTHR SPECIMN AEROBIC: CPT | Performed by: ORTHOPAEDIC SURGERY

## 2017-10-08 PROCEDURE — 80048 BASIC METABOLIC PNL TOTAL CA: CPT | Performed by: SURGERY

## 2017-10-08 PROCEDURE — S0028 INJECTION, FAMOTIDINE, 20 MG: HCPCS | Performed by: THORACIC SURGERY (CARDIOTHORACIC VASCULAR SURGERY)

## 2017-10-08 PROCEDURE — 25800025 ZZH RX 258: Performed by: ORTHOPAEDIC SURGERY

## 2017-10-08 PROCEDURE — 0MDP4ZZ EXTRACTION OF LEFT KNEE BURSA AND LIGAMENT, PERCUTANEOUS ENDOSCOPIC APPROACH: ICD-10-PCS | Performed by: ORTHOPAEDIC SURGERY

## 2017-10-08 PROCEDURE — 25000128 H RX IP 250 OP 636: Performed by: THORACIC SURGERY (CARDIOTHORACIC VASCULAR SURGERY)

## 2017-10-08 PROCEDURE — 25000125 ZZHC RX 250: Performed by: ORTHOPAEDIC SURGERY

## 2017-10-08 PROCEDURE — 3E1U38Z IRRIGATION OF JOINTS USING IRRIGATING SUBSTANCE, PERCUTANEOUS APPROACH: ICD-10-PCS | Performed by: ORTHOPAEDIC SURGERY

## 2017-10-08 PROCEDURE — 40000275 ZZH STATISTIC RCP TIME EA 10 MIN

## 2017-10-08 PROCEDURE — 84100 ASSAY OF PHOSPHORUS: CPT | Performed by: SURGERY

## 2017-10-08 PROCEDURE — 27210429 ZZH NUTRITION PRODUCT INTERMEDIATE LITER

## 2017-10-08 PROCEDURE — 00000146 ZZHCL STATISTIC GLUCOSE BY METER IP

## 2017-10-08 PROCEDURE — 25000125 ZZHC RX 250: Performed by: THORACIC SURGERY (CARDIOTHORACIC VASCULAR SURGERY)

## 2017-10-08 PROCEDURE — 95951 ZZHC EEG VIDEO EACH 24 HR: CPT | Mod: ZF

## 2017-10-08 PROCEDURE — 85610 PROTHROMBIN TIME: CPT | Performed by: SURGERY

## 2017-10-08 PROCEDURE — 83735 ASSAY OF MAGNESIUM: CPT | Performed by: SURGERY

## 2017-10-08 PROCEDURE — 36000057 ZZH SURGERY LEVEL 3 1ST 30 MIN - UMMC: Performed by: ORTHOPAEDIC SURGERY

## 2017-10-08 PROCEDURE — 20000004 ZZH R&B ICU UMMC

## 2017-10-08 PROCEDURE — 87205 SMEAR GRAM STAIN: CPT | Performed by: ORTHOPAEDIC SURGERY

## 2017-10-08 PROCEDURE — 25000128 H RX IP 250 OP 636: Performed by: NURSE ANESTHETIST, CERTIFIED REGISTERED

## 2017-10-08 PROCEDURE — 37000009 ZZH ANESTHESIA TECHNICAL FEE, EACH ADDTL 15 MIN: Performed by: ORTHOPAEDIC SURGERY

## 2017-10-08 PROCEDURE — 40000281 ZZH STATISTIC TRANSPORT TIME EA 15 MIN

## 2017-10-08 PROCEDURE — 87102 FUNGUS ISOLATION CULTURE: CPT | Performed by: ORTHOPAEDIC SURGERY

## 2017-10-08 PROCEDURE — 25000128 H RX IP 250 OP 636: Performed by: SURGERY

## 2017-10-08 PROCEDURE — 36000059 ZZH SURGERY LEVEL 3 EA 15 ADDTL MIN UMMC: Performed by: ORTHOPAEDIC SURGERY

## 2017-10-08 PROCEDURE — 27210995 ZZH RX 272: Performed by: ORTHOPAEDIC SURGERY

## 2017-10-08 PROCEDURE — 82803 BLOOD GASES ANY COMBINATION: CPT | Performed by: SURGERY

## 2017-10-08 PROCEDURE — 25000125 ZZHC RX 250: Performed by: SURGERY

## 2017-10-08 PROCEDURE — 36620 INSERTION CATHETER ARTERY: CPT | Mod: GC | Performed by: ANESTHESIOLOGY

## 2017-10-08 PROCEDURE — 25000565 ZZH ISOFLURANE, EA 15 MIN: Performed by: ORTHOPAEDIC SURGERY

## 2017-10-08 PROCEDURE — 93010 ELECTROCARDIOGRAM REPORT: CPT | Performed by: INTERNAL MEDICINE

## 2017-10-08 PROCEDURE — 25800025 ZZH RX 258: Performed by: THORACIC SURGERY (CARDIOTHORACIC VASCULAR SURGERY)

## 2017-10-08 PROCEDURE — 84484 ASSAY OF TROPONIN QUANT: CPT | Performed by: SURGERY

## 2017-10-08 PROCEDURE — 87075 CULTR BACTERIA EXCEPT BLOOD: CPT | Performed by: ORTHOPAEDIC SURGERY

## 2017-10-08 PROCEDURE — 40000014 ZZH STATISTIC ARTERIAL MONITORING DAILY

## 2017-10-08 PROCEDURE — 82805 BLOOD GASES W/O2 SATURATION: CPT | Performed by: THORACIC SURGERY (CARDIOTHORACIC VASCULAR SURGERY)

## 2017-10-08 PROCEDURE — 27210794 ZZH OR GENERAL SUPPLY STERILE: Performed by: ORTHOPAEDIC SURGERY

## 2017-10-08 PROCEDURE — 25000132 ZZH RX MED GY IP 250 OP 250 PS 637: Performed by: THORACIC SURGERY (CARDIOTHORACIC VASCULAR SURGERY)

## 2017-10-08 PROCEDURE — 99291 CRITICAL CARE FIRST HOUR: CPT | Mod: 25 | Performed by: ANESTHESIOLOGY

## 2017-10-08 PROCEDURE — 37000008 ZZH ANESTHESIA TECHNICAL FEE, 1ST 30 MIN: Performed by: ORTHOPAEDIC SURGERY

## 2017-10-08 PROCEDURE — 70450 CT HEAD/BRAIN W/O DYE: CPT

## 2017-10-08 PROCEDURE — 0S9D3ZX DRAINAGE OF LEFT KNEE JOINT, PERCUTANEOUS APPROACH, DIAGNOSTIC: ICD-10-PCS | Performed by: ORTHOPAEDIC SURGERY

## 2017-10-08 PROCEDURE — 25000128 H RX IP 250 OP 636: Performed by: ORTHOPAEDIC SURGERY

## 2017-10-08 PROCEDURE — 94003 VENT MGMT INPAT SUBQ DAY: CPT

## 2017-10-08 RX ORDER — SODIUM CHLORIDE 9 MG/ML
INJECTION, SOLUTION INTRAVENOUS CONTINUOUS PRN
Status: DISCONTINUED | OUTPATIENT
Start: 2017-10-08 | End: 2017-10-08

## 2017-10-08 RX ORDER — SODIUM CHLORIDE 9 MG/ML
INJECTION, SOLUTION INTRAVENOUS CONTINUOUS
Status: ACTIVE | OUTPATIENT
Start: 2017-10-08 | End: 2017-10-08

## 2017-10-08 RX ORDER — MAGNESIUM HYDROXIDE 1200 MG/15ML
LIQUID ORAL PRN
Status: DISCONTINUED | OUTPATIENT
Start: 2017-10-08 | End: 2017-10-08 | Stop reason: HOSPADM

## 2017-10-08 RX ORDER — SODIUM CHLORIDE 9 MG/ML
INJECTION, SOLUTION INTRAVENOUS
Status: DISCONTINUED
Start: 2017-10-08 | End: 2017-10-11 | Stop reason: HOSPADM

## 2017-10-08 RX ADMIN — FAMOTIDINE 20 MG: 10 INJECTION, SOLUTION INTRAVENOUS at 10:12

## 2017-10-08 RX ADMIN — ACETAMINOPHEN 650 MG: 325 SOLUTION ORAL at 20:26

## 2017-10-08 RX ADMIN — HYDRALAZINE HYDROCHLORIDE 10 MG: 20 INJECTION INTRAMUSCULAR; INTRAVENOUS at 23:34

## 2017-10-08 RX ADMIN — POTASSIUM CHLORIDE, DEXTROSE MONOHYDRATE AND SODIUM CHLORIDE: 150; 5; 450 INJECTION, SOLUTION INTRAVENOUS at 15:19

## 2017-10-08 RX ADMIN — HYDRALAZINE HYDROCHLORIDE 10 MG: 20 INJECTION INTRAMUSCULAR; INTRAVENOUS at 20:26

## 2017-10-08 RX ADMIN — NAFCILLIN SODIUM 2 G: 2 INJECTION, POWDER, LYOPHILIZED, FOR SOLUTION INTRAMUSCULAR; INTRAVENOUS at 10:04

## 2017-10-08 RX ADMIN — FENTANYL CITRATE 75 MCG/HR: 50 INJECTION, SOLUTION INTRAMUSCULAR; INTRAVENOUS at 08:01

## 2017-10-08 RX ADMIN — NAFCILLIN SODIUM 2 G: 2 INJECTION, POWDER, LYOPHILIZED, FOR SOLUTION INTRAMUSCULAR; INTRAVENOUS at 01:49

## 2017-10-08 RX ADMIN — FAMOTIDINE 20 MG: 10 INJECTION, SOLUTION INTRAVENOUS at 21:46

## 2017-10-08 RX ADMIN — ROCURONIUM BROMIDE 30 MG: 10 INJECTION INTRAVENOUS at 09:05

## 2017-10-08 RX ADMIN — HYDRALAZINE HYDROCHLORIDE 10 MG: 20 INJECTION INTRAMUSCULAR; INTRAVENOUS at 01:13

## 2017-10-08 RX ADMIN — HUMAN INSULIN 1 UNITS/HR: 100 INJECTION, SOLUTION SUBCUTANEOUS at 03:39

## 2017-10-08 RX ADMIN — HYDRALAZINE HYDROCHLORIDE 10 MG: 20 INJECTION INTRAMUSCULAR; INTRAVENOUS at 18:17

## 2017-10-08 RX ADMIN — LEVOFLOXACIN 750 MG: 5 INJECTION, SOLUTION INTRAVENOUS at 11:41

## 2017-10-08 RX ADMIN — SENNOSIDES AND DOCUSATE SODIUM 2 TABLET: 8.6; 5 TABLET ORAL at 20:26

## 2017-10-08 RX ADMIN — SODIUM CHLORIDE: 9 INJECTION, SOLUTION INTRAVENOUS at 12:38

## 2017-10-08 RX ADMIN — GENTAMICIN SULFATE 230 MG: 40 INJECTION, SOLUTION INTRAMUSCULAR; INTRAVENOUS at 07:35

## 2017-10-08 RX ADMIN — POTASSIUM CHLORIDE 20 MEQ: 200 INJECTION, SOLUTION INTRAVENOUS at 04:29

## 2017-10-08 RX ADMIN — PROPOFOL 10 MCG/KG/MIN: 10 INJECTION, EMULSION INTRAVENOUS at 06:42

## 2017-10-08 RX ADMIN — NAFCILLIN SODIUM 2 G: 2 INJECTION, POWDER, LYOPHILIZED, FOR SOLUTION INTRAMUSCULAR; INTRAVENOUS at 17:48

## 2017-10-08 RX ADMIN — NAFCILLIN SODIUM 2 G: 2 INJECTION, POWDER, LYOPHILIZED, FOR SOLUTION INTRAMUSCULAR; INTRAVENOUS at 14:06

## 2017-10-08 RX ADMIN — SODIUM CHLORIDE: 9 INJECTION, SOLUTION INTRAVENOUS at 09:05

## 2017-10-08 RX ADMIN — HYDRALAZINE HYDROCHLORIDE 10 MG: 20 INJECTION INTRAMUSCULAR; INTRAVENOUS at 17:46

## 2017-10-08 RX ADMIN — HYDRALAZINE HYDROCHLORIDE 10 MG: 20 INJECTION INTRAMUSCULAR; INTRAVENOUS at 22:10

## 2017-10-08 RX ADMIN — POTASSIUM PHOSPHATE, MONOBASIC AND POTASSIUM PHOSPHATE, DIBASIC 15 MMOL: 224; 236 INJECTION, SOLUTION INTRAVENOUS at 05:54

## 2017-10-08 RX ADMIN — ROCURONIUM BROMIDE 50 MG: 10 INJECTION INTRAVENOUS at 08:20

## 2017-10-08 RX ADMIN — NAFCILLIN SODIUM 2 G: 2 INJECTION, POWDER, LYOPHILIZED, FOR SOLUTION INTRAMUSCULAR; INTRAVENOUS at 21:45

## 2017-10-08 RX ADMIN — SODIUM CHLORIDE: 9 INJECTION, SOLUTION INTRAVENOUS at 08:18

## 2017-10-08 RX ADMIN — NAFCILLIN SODIUM 2 G: 2 INJECTION, POWDER, LYOPHILIZED, FOR SOLUTION INTRAMUSCULAR; INTRAVENOUS at 05:58

## 2017-10-08 RX ADMIN — HYDRALAZINE HYDROCHLORIDE 10 MG: 20 INJECTION INTRAMUSCULAR; INTRAVENOUS at 10:08

## 2017-10-08 ASSESSMENT — VISUAL ACUITY
OU: OTHER (SEE COMMENT)

## 2017-10-08 NOTE — PLAN OF CARE
Problem: Patient Care Overview  Goal: Plan of Care/Patient Progress Review  PT 4A: Cancel- PT orders received, per RN patient not medically appropriate for therapy today, currently has bedrest orders. Will continue to follow and initiate as appropriate.

## 2017-10-08 NOTE — PROCEDURES
EEG#:  -1       VIDEO EEG DAY:  #1       DATE OF RECORDING:  10/07/2017      SOURCE FILE DURATION: 11 hours and 1 minute.      PATIENT INFORMATION:  Cricket Miguel is a 62-year-old male who has a complicated cardiac history presents with encephalopathy and was transferred to [facility] for further management.  Off sedation, the patient continued to be encephalopathic and was found to have a cardioembolic stroke.  On exam, he had left gaze preference and questionable less movement on the right side.  MRI shows left MCA and right occipital stroke.      MEDICATIONS:  Fentanyl drip, propofol drip.     TECHNICAL SUMMARY: This video EEG monitoring procedure was performed with 23 scalp electrodes in 10-20 system placements, and additional scalp, precordial and other surface electrodes used for electrical referencing and artifact detection. No electro clinical seizures or any electrographic seizures were seen. Video was reviewed intermittently by EEG technologist and physician for clinical seizures.      EEG FINDINGS:  EEG show an asynchronous background in which there is more pronounced left hemispheric slowing, more predominantly in the left posterior quadrant compared to the right side.  There is also intermittent generalized delta-theta slowing.  The right hemisphere is able to produce faster rhythms with some regions where there is a parieto-occipital rhythm of 6 to 7 Hz.  In rare instances, there are sleep architectures such as sleep spindles at 1735, and some of these sleep spindles are asymmetric.  A good example is at 17:35:23 in which we sleep spindles in the right hemisphere and midline region, but not in the left frontocentral region.  Now, there are also sharp transients noted in the left frontocentral region.  Now, these discharges have sharp transients in the left hemisphere.       ICTAL:  No electrographic seizures were seen.      EPILEPTIFORM DISCHARGES:  Patient  has sharp transients in the left hemisphere with maximum negativity seen at F3; these may represent areas of cortical irritability.  A good example is at 17:35:01.       IMPRESSION:  Video EEG day 1 is abnormal due to the presence of intermittent generalized delta-theta slowing and continuous left hemispheric slowing with maximum slowing noted in the left posterior quadrant.  There are rare segments of focal left frontal epileptiform discharges with maximum negativity seen in the frontocentral region.  No clear electrographic seizures are seen.  These findings are suggestive of a moderate encephalopathy with maximum cortical dysfunction in the left hemisphere (left posterior quadrant) consistent with patient's history of left hemispheric stroke.  No electrographic seizures were seen in this record.  The patient does have sharp waves in the left frontocentral region, which represent cortical irritability.  Clinical correlation is advised.         KIM QUEVEDO MD             D: 10/08/2017 10:41   T: 10/08/2017 11:16   MT: CC      Name:     ASHTYN STROUD   MRN:      [id number]        Account:        [id number]   :      1955           Procedure Date: 10/07/2017      Document: [id number]

## 2017-10-08 NOTE — PROGRESS NOTES
Evaluated patient due to left knee aspiration culture result positive for light growth staph aureus.     Gen: intubated, sedated. Does not respond to verbal stimuli or pain.   BLE with significant mottling. Left knee is warmer than contralateral side, but very mild effusion. Tolerates full PROM without grimacing, but sedated. Feet cool. Unable to assess sensory or motor function due to sedation.     Imaging: L knee XR reveals no fracture, no effusion. Mild medial joint space narrowing.     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     Cell count was very low, not classic for infection (cell count 3897) however 97% neutrophils and cultures positive.     Plan  -OWEN to evaluated for vegetations tonight.   -Head CT tonight to monitor due to embolic stroke.   -From ortho standpoint, we would like to wait until the above work up is complete prior to proceeding with left knee arthroscopic I&D.    -Left knee arthroscopic I&D scheduled for tomorrow at 08:00 as long as tonight's findings on OWEN and head CT do not preclude surgery.     Discussed with Dr. Alexander Palacios PGY-4  Orthopedic Surgery  [phone]

## 2017-10-08 NOTE — PROGRESS NOTES
"Neurocritical Care Progress notes    Reason for consult: encephalopathy    Impression & Recommedations:    # Cardioembolic stroke Day 2- with focal signs on exam (L gaze preference, question of less movement on R side).       MRI showed L cerebellar, L MCA and R occipital stroke- cardioembolic etiology . SWI did show Microbleeds- possible mycotic aneurysms.    ECHO- Atria were not assessed.OWEN report pending.    Cerebellar edema watch- Repeat CT head this evening. Discussed with patient's family and primary team that he might need suboccpital crani and to hold ASA and heparin.    Blood cultures came back with S.Aureus.( S.Aureus IE has highest risk of bleeding, if he needs anticoagulation then he would also be a high risk of bleeding 2/2 the fairly large size of the stroke.)    Discussed with IR team about diagnostic angio to look for mycotic aneurysms- will wait till Monday as the patient tides over the cerebellar edema phase.    Continue EEG for one more day as the patient has unclear rare epileptiform discharges in the left fronto central regions.    Neuro crit team will follow.      Patient seen & discussed w/  Neuro-ICU Staff Dr. Johnson.      HPI:   61 yo man with hx aortic valve replacement 2016 transferred to [facility] from [facility] with encephalopathy & fluid collection around aortic root and ascending aorta. Per family, patient had been feeling sick with \"flu-like symptoms\" on evening of 10/5/17. Around 8:30AM on 10/6/17, patients wife talked to him in bed and thought he seemed fine, except for the flu-like symptoms. Around 9:30 AM on 10/6 wife heard a \"thud\" and saw that patient fell out of bed. She called 911 & patient was taken to [facility] where he was intubated for airway protection. Initial CTH w/out acute pathology; CT chest/abd/pelv showed fluid collection with enhancing margin around aortic root & ascending aorta. Transferred to [facility] for further mgmt. Has been off sedation for several " hours & continues to be encephalopathic, so Neurology consulted for further evaluation. Per family, patient has no history of stroke or seizures.    ROS: Negative except as stated above.    PMHx/PSHx:  Past Medical History:   Diagnosis Date     AI (aortic insufficiency)      Aortic root dilatation (H)      AR (aortic regurgitation)     severe     Cardiomyopathy (H)      CHF (congestive heart failure), NYHA class II (H)      COPD, mild (H)     spirometry 12/16     Heart murmur      Hyperlipidemia LDL goal <70 10/31/2010     Hypertension goal BP (blood pressure) < 140/90      Inguinal hernia      left lung nodule  1/29/2008     Major depressive disorder, single episode, moderate (H)      Psoriasis childhood     Tobacco use disorder      Past Surgical History:   Procedure Laterality Date     HC SHOULDER ARTHROSCOPY, DX  2001    Arthroscopy, Shoulder RT Dr OSIRIS Andersen     HC SHOULDER ARTHROSCOPY, DX  1/04    LT arthroscopy Dr OSIRIS Andersen     HERNIA REPAIR, UMBILICAL  1/08    incarcerated - dr rockwell     HERNIORRHAPHY INGUINAL  12/21/2012    HERNIORRHAPHY INGUINAL;  Right Inguinal Hernia Repair with mesh ;  Surgeon: Mello Rockwell MD;  Location: [facility] OR     REPLACE VALVE AORTIC N/A 5/17/2016    REPLACE VALVE AORTIC - Dr Bowers     ROTATOR CUFF REPAIR RT/LT  11/10    Rt arthroscopy - Dr OSIRIS Andersen     SURGICAL HISTORY OF -   5/16    AVR, severe AR, aortic root repair       Medications:    NaCl         [START ON 10/15/2017] influenza quadrivalent (PF) vacc age 3 yrs and older  0.5 mL Intramuscular Prior to discharge     [START ON 10/15/2017] pneumococcal vaccine  0.5 mL Intramuscular Prior to discharge     sodium chloride (PF)  3 mL Intravenous Q8H     gentamicin  3 mg/kg (Adjusted) Intravenous Q24H     multivitamins with minerals  15 mL Per Feeding Tube Daily     nafcillin  2 g Intravenous Q4H     levofloxacin  750 mg Intravenous Q24H     senna-docusate  1-2 tablet Oral or Feeding Tube BID     sodium chloride (PF)  3 mL  "Intracatheter Q8H     mupirocin  0.5 g Both Nostrils BID     famotidine  20 mg Intravenous Q12H       Allergies:  Lisinopril    Social Hx: Owns construction business. Per family, does not consume etoh or recreational drugs.    Family Hx: Non-contributory    Exam:  /78  Pulse 82  Temp 100.6  F (38.1  C)  Resp 18  Ht 1.727 m (5' 8\")  Wt 87.6 kg (193 lb 3.2 oz)  SpO2 96%  BMI 29.38 kg/m2    Gen: NAD, intubated & lying in bed   Neck: head deviated to left, difficult to overcome increased tone in neck  CV: RRR  Lungs: mechanical ventilation  Abd: soft  Ext: no pedal edema  Neuro: eyes closed, not following commands, no speech production/intubated, not awakening to noxious stimuli (both proximal & distal). Pupils 3mm & constrict to light bilaterally. L gaze preference, able to overcome w/ oculocephalic maneuver. Intact corneal relfex, face appears symmetric, intact cough reflex. Limbs w/ flaccid tone throughout. Withdraws to noxious stimulation in all 4 extremities, though possibly less movement on R side. 2+ symmetric reflexes throughout, down-going toes bilaterally.    Pertinent Data:  CT head   \"IMPRESSION: Chronic changes. No evidence for intracranial hemorrhage  or any acute process.\"    MRI Brain reviewed. L cerebellar, L.MCA and R occipital strokes        Daniel KEN  11:38 AM October 8, 2017  Vascular Neurology fellow.  "

## 2017-10-08 NOTE — PLAN OF CARE
Problem: Patient Care Overview  Goal: Plan of Care/Patient Progress Review  OT4A: CX: Pt to go to OR today, continues to be intubated/sedated.

## 2017-10-08 NOTE — ANESTHESIA POSTPROCEDURE EVALUATION
Patient: Cricket Miguel    Procedure(s):  Arthroscopic Incision and Drainage of Left Knee - Wound Class: IV-Dirty or Infected    Diagnosis:Infected Knee  Diagnosis Additional Information: No value filed.    Anesthesia Type:  General, ETT    Note:  Anesthesia Post Evaluation    Patient location during evaluation: ICU  Patient participation: Unable to evaluate secondary to administered sedation  Level of consciousness: obtunded/minimal responses  Pain management: unable to assess  Airway patency: patent  Cardiovascular status: acceptable  Respiratory status: acceptable, intubated and ventilator  Hydration status: acceptable  PONV: unable to assess             Last vitals:  Vitals:    10/08/17 0700 10/08/17 0800 10/08/17 1000   BP:      Pulse:      Resp:  18 18   Temp: 36.8  C (98.2  F) 36.6  C (97.9  F) 36.4  C (97.6  F)   SpO2: 95% 95% 98%         Electronically Signed By: Marcelo Matute MD  October 8, 2017  12:46 PM

## 2017-10-08 NOTE — PLAN OF CARE
Problem: Restraint for Non-Violent/Non-Self-Destructive Behavior  Goal: Prevent/Manage Potential Problems  Maintain safety of patient and others during period of restraint.  Promote psychological and physical wellbeing.  Prevent injury to skin and involved body parts.  Promote nutrition, hydration, and elimination.   Outcome: No Change  Pt continues to pull at lines and ET tube. Restraints continued

## 2017-10-08 NOTE — BRIEF OP NOTE
Orthopaedic Surgery Brief Op-Note      Patient: Cricket Miguel  : 1955  Date of Service: 10/8/2017 9:26 AM    Pre-operative Diagnosis: Infected Knee  Post-operative Diagnosis: same    Procedure(s) Performed: Procedure(s):  Arthroscopic Incision and Drainage of Left Knee - Wound Class: IV-Dirty or Infected    Staff: Alexander  Assistants:   Marisabel Orona MD    Anesthesia: General  EBL: 5 cc  UOP: see anesthesia record  Tourniquet Time: 15    Implants:   NA       Drains: None  Intra-op Labs/Cxs/Specimens: Culture  Complications: No apparent complications during procedure  Findings: Please see dictated operative note for details    Disposition: Stable to PACU, then admit to ICU.        Assessment/Plan:   Cricket Miguel is a 62 year old male s/p Procedure(s):  Arthroscopic Incision and Drainage of Left Knee - Wound Class: IV-Dirty or Infected on 10/8/2017 with Dr. Munoz.    Weight bearing status: WBAT LLE   Antibiotics: Continue per primary  Diet: per primary  DVT prophylaxis: per primary  Dressing: Change POD 4-5  Bracing/Splinting: None  Cultures: Pending, follow culture results closely.        PAULA Orona MD  Orthopaedic Surgery Resident, PGY-4  Pager: [phone]  10/08/2017    For questions about this patient, please attempt to contact me at my pager prior to contacting the orthopaedics resident on call. Thank you!

## 2017-10-08 NOTE — MR AVS SNAPSHOT
After Visit Summary   10/8/2017    Cricket Miguel    MRN: [id number]           Patient Information     Date Of Birth          1955        Visit Information        Provider Department      10/8/2017 7:00 AM P EEG TECH 4 [facility]        Today's Diagnoses     Seizures (H)    -  1       Follow-ups after your visit        Your next 10 appointments already scheduled     Oct 09, 2017  7:00 AM CDT   24 Hour Video Visit with [facility] EEG TECH 4   [facility] ([facility])    [facility]  [address] [address] [phone]           [place]: Your appointment is scheduled at [facility]. [address] [place] [address]              Who to contact     Please call your clinic at [phone] to:    Ask questions about your health    Make or cancel appointments    Discuss your medicines    Learn about your test results    Speak to your doctor   If you have compliments or concerns about an experience at your clinic, or if you wish to file a complaint, please contact [facility] Patient Relations at [phone] or email us at [email or web address]         Additional Information About Your Visit        MyChart Information     Luat gives you secure access to your electronic health record. If you see a primary care provider, you can also send messages to your care team and make appointments. If you have questions, please call your primary care clinic.  If you do not have a primary care provider, please call [phone] and they will assist you.      PLUQ is an electronic gateway that provides easy, online access to your medical records. With PLUQ, you can request a clinic appointment, read your test results, renew a prescription or communicate with your care team.     To access your existing account, please contact your [facility] Clinic or call  [phone] for assistance.        Care EveryWhere ID     This is your Care EveryWhere ID. This could be used by other organizations to access your [place] medical records  [id number]         Blood Pressure from Last 3 Encounters:   10/06/17 117/78   10/06/17 125/86   02/06/17 132/84    Weight from Last 3 Encounters:   10/08/17 87.6 kg (193 lb 3.2 oz)   02/06/17 91.6 kg (202 lb)   01/09/17 87.5 kg (193 lb)              We Performed the Following     Glucose by meter     Glucose by meter     Glucose by meter     Glucose by meter     Glucose by meter          Today's Medication Changes      Notice     This visit is during an admission. Changes to the med list made in this visit will be reflected in the After Visit Summary of the admission.             Primary Care Provider Office Phone # Fax #    Dipak Gordillo [phone] [address] [place] [id number]        Equal Access to Services     DANIEL BILLS : Hadii jenise nova hadasho Soomaali, waaxda luqadaha, qaybta kaalmada adeegyada, freddie rodríguez . So [facility] [phone].    ATENCIÓN: Si habla español, tiene a mendiola disposición servicios gratuitos de asistencia lingüística. Llame al [phone].    We comply with applicable federal civil rights laws and [place] laws. We do not discriminate on the basis of race, color, national origin, age, disability, sex, sexual orientation, or gender identity.            Thank you!     Thank you for choosing [facility]  for your care. Our goal is always to provide you with excellent care. Hearing back from our patients is one way we can continue to improve our services. Please take a few minutes to complete the written survey that you may receive in the mail after your visit with us. Thank you!             Your Updated Medication List - Protect others around you: Learn how to safely use, store and throw away your medicines at www.disposemymeds.org.      Notice     This visit is  during an admission. Changes to the med list made in this visit will be reflected in the After Visit Summary of the admission.

## 2017-10-08 NOTE — PLAN OF CARE
Problem: Stroke (Ischemic) (Adult)  Goal: Signs and Symptoms of Listed Potential Problems Will be Absent, Minimized or Managed (Stroke)  Signs and symptoms of listed potential problems will be absent, minimized or managed by discharge/transition of care (reference Stroke (Ischemic) (Adult) CPG).   Outcome: No Change  D/I: Maintaining SBP <140 mostly, got up to 150-160s restless, PRN Hydralazine given.  moving all extremities when off sedation. Right side remains unresponsive to painful stimuli. Pt opens eyes spontaneously at times. Able to squeeze left hand lightly. Emesis x 1.. zofran given... OG placed to suction (300cc output, brown/green, MD aware)  Fentanyl and propofol gtt restarted.   A: VSS. Vented/sedated.  Bilateral LE cold/mottled/dusky.. Except left knee area swollen and warm to touch. MD aware  P: OWEN being done at bedside now. Plan for head CT afterwards then possibly to OR tonight or tomorrow for I&D left knee. Continue to monitor. See flow sheet for details.

## 2017-10-08 NOTE — PROCEDURES
"Procedure/Surgery Information   [facility], [place]    Bedside Procedure Note  Date of Service (when I performed the procedure): 10/08/2017    Cricket Miguel is a 62 year old male patient.  1. Dissection of aorta, unspecified portion of aorta (H)    2. Sepsis due to other etiology (H)    3. NSTEMI (non-ST elevated myocardial infarction) (H)    4. Hypotension, unspecified hypotension type      Past Medical History:   Diagnosis Date     AI (aortic insufficiency)      Aortic root dilatation (H)      AR (aortic regurgitation)     severe     Cardiomyopathy (H)      CHF (congestive heart failure), NYHA class II (H)      COPD, mild (H)     spirometry 12/16     Heart murmur      Hyperlipidemia LDL goal <70 10/31/2010     Hypertension goal BP (blood pressure) < 140/90      Inguinal hernia      left lung nodule  1/29/2008     Major depressive disorder, single episode, moderate (H)      Psoriasis childhood     Tobacco use disorder      Temp: 97.6  F (36.4  C) Temp src: Axillary     Heart Rate: 90 Resp: 18 SpO2: 98 % O2 Device: Mechanical Ventilator      Insert arterial line  Date/Time: 10/6/2017 6:00 PM  Performed by: EVE JACOBSEN  Authorized by: EVE JACOBSEN   Consent: Verbal consent obtained. Written consent obtained.  Risks and benefits: risks, benefits and alternatives were discussed  Consent given by: consent by daughter.  Patient identity confirmed: provided demographic data and hospital-assigned identification number  Time out: Immediately prior to procedure a \"time out\" was called to verify the correct patient, procedure, equipment, support staff and site/side marked as required.  Indications: multiple ABGs and hemodynamic monitoring  Location: left radial  Anesthesia: local infiltration    Anesthesia:  Local Anesthetic: lidocaine 1% without epinephrine    Sedation:  Patient sedated: no  Needle gauge: 20  Seldinger technique: Seldinger technique used  Number of attempts: " 1  Post-procedure: line sutured  Post-procedure CMS: normal  Patient tolerance: Patient tolerated the procedure well with no immediate complications           Tera Alford

## 2017-10-08 NOTE — OP NOTE
DATE OF SURGERY:  10/08/2017      PREOPERATIVE DIAGNOSIS:  Positive culture, left knee.      POSTOPERATIVE DIAGNOSIS:  Positive culture, left knee.      PROCEDURE:  Arthroscopic irrigation and debridement of left knee.      STAFF SURGEON:  Lucretia Munoz MD      ASSISTANT:  PAULA Orona MD      ANESTHESIA:  General endotracheal anesthesia.      ESTIMATED BLOOD LOSS:  10 mL      TOURNIQUET TIME:  15 minutes.      SPECIMENS:  Left knee synovial fluid for Gram stain, aerobic and anaerobic cultures.      BRIEF PATIENT HISTORY:  Mr. Cricket Miguel is a 62-year-old male who was admitted to the [facility] for SIRS.  He has a history of composite aortic valve and root replacement and he was admitted to Cardiothoracic Surgery.  The patient had reported left knee pain for months and had had a previous steroid injection 1 month ago. On admission he had positive blood cultures with unclear source (concern for cardiothoracic vs knee).  He had a left knee aspiration on 10/06 and cell count was 3897 with 97% neutrophils.  Approximately 24 hours later, he had culture positive for Staph aureus.  Therefore, given the positive culture, we offered arthroscopic irrigation and debridement.  Note, the patient also has positive blood cultures and concern for other positive sources.  The recommendation for arthroscopic irrigation and debridement was discussed with the patient's significant other, Meghna, and informed consent was obtained from her as the patient remained intubated and sedated in the SICU.      DESCRIPTION OF PROCEDURE:  The patient was identified in the SICU and the correct left knee was marked for surgery.  He was brought down to the operating room, intubated and sedated.  He was transferred to the operating room table and kept in the supine position.  A bump was placed under the left hip.  The left lower extremity was prepped and draped in the usual sterile fashion.  A timeout was held in accordance with hospital policy,  confirming correct patient, side, site, procedure and ongoing administration of IV antibiotics.  We initiated surgery via aspiration through an inferolateral portal site.  We obtained about 8 mL of synovial fluid, which did not appear purulent or obviously cloudy.  That was sent for culture and Gram stain.  The arthroscope was introduced through the inferolateral portal and brought to the suprapatellar pouch.  The knee was then irrigated.  An inferomedial portal was placed and the shaver introduced.  We inspected the surfaces.  There was grade 1 chondral change on the patella.  There was no significant wear of the trochlea.  In the medial compartment, there was grade 2 with very small areas of grade 3 wear on the femoral condyle.  There were areas of grade 2 wear on the tibial plateau.  There was some degenerative fraying of the medial meniscus.  The ACL was intact.  In the lateral compartment, there was some degenerative fraying of the meniscus.  There was grade 1-2 changes on the lateral femoral condyle and lateral tibial plateau.  We irrigated the joint with 6 L of triple antibiotic irrigation.  The instruments were then removed from the knee, portal sites closed with 2-0 nylon.  Soft sterile dressings were applied.  An Ace bandage was applied.  The patient was then transferred back to his bed and remained intubated and sedated and transferred back to the SICU, his baseline condition as he presented to the operating room.      POSTOPERATIVE PLAN:   1.  The patient will remain on the Cardiothoracic Service in the ICU under their care.  Orthopedics will monitor cultures and follow.  Infectious Disease is also consulting.   2.  Sutures to be removed in 10-14 days.   3.  Activity as tolerated regarding the left lower extremity.         MEHRDAD FRANCIS MD             D: 10/08/2017 10:06   T: 10/08/2017 10:34   MT: TW      Name:     ASHTYN STROUD   MRN:      [id number]        Account:        [id number]   :       1955           Procedure Date: 10/08/2017      Document: [id number]

## 2017-10-08 NOTE — PLAN OF CARE
Problem: Sepsis/Septic Shock (Adult)  Goal: Signs and Symptoms of Listed Potential Problems Will be Absent, Minimized or Managed (Sepsis/Septic Shock)  Signs and symptoms of listed potential problems will be absent, minimized or managed by discharge/transition of care (reference Sepsis/Septic Shock (Adult) CPG).   Outcome: No Change  Neuro: Pupils are equal and reactive 2-3mm. Pt will occasionally follow commands on the left and spontaneously moves right. Will withdraw to pain on all 4 extremities.   CV: 90s overnight, episode of tachycardia overnight for about 2 hours with HR in 115s. MD aware. SBP goal <140, achieved with PRN hydralazine.   Resp: Intubated, 40%, 500, 18, PEEP 5. Clear lung sounds, white secretions.   GI: OG to LIS. NPO, hypoactive BS.   : Espinoza in place, adequate UO  Skin: Mottled, pale dusky lower extremities. Left knee is warm to touch. Bilateral feet are cool and occasionally warm. Pulses palpable.  Plan: OR this morning for left knee washout.

## 2017-10-08 NOTE — ANESTHESIA PREPROCEDURE EVALUATION
Anesthesia Evaluation     . Pt has had prior anesthetic. Type: General    No history of anesthetic complications          ROS/MED HX    ENT/Pulmonary:     (+)AYDIN risk factors hypertension, tobacco use, Current use mild COPD, , . .   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 No clear communication noted between the aorta  and the echo free space around the aorta with contrast study or color Doppler.        (+) Dyslipidemia, hypertension----. : . CHF Last EF: 55-60% date: 10/7 . OSUNA, . :. valvular problems/murmurs (s/p bioprosthetic aortic valve) type: AI . Previous cardiac testing Echodate:10/6/2017results:    ECHO: Interpretation Summary  Global and regional left ventricular function is normal with an EF of 55-60%.  Mild to moderate right ventricular dilation is present. Global right  ventricular function is moderately reduced.  Bioprosthetic aortic valve.Doppler interrogation of the valve is not assessed.  No aortic regurgitation.  There is significant fluid collection on both sides of the ascending aorta  originating from aortic root. No clear communication noted between the aorta  and the echo free space around the aorta with contrast study or color Doppler.     Trace tricuspid insufficiency is present.  Right ventricular systolic pressure is 19mmHg above the right atrial pressure.  IVC with abnormal respiratory variability while on mechanical ventilation.This  does not accurately reflect volume status due to positive intrathoracic  pressures.  This study was compared with the study from 4/27/2016 .Patient is s/p  bioprosthetic AVR and aortic root replacement.    date: results:ECG reviewed  date:10/7/2017 results:    EKG: Sinus rhythm at 85, no ST/T changes, no ectopy. QTc normal.    Cath date: 5/2016 results:Angiographic Results:  Right coronary artery is a small nondominant vessel.  Left main coronary artery has mild ostial tapering in the 10-20%  range.   Left anterior descending artery is a large wraparound vessel. There is  narrowing in the mid vessel that appears to be in the 40-50% range.  There is a large ramus intermedius branch with minimal luminal  irregularities.  Circumflex coronary artery has a 30% narrowing prior to the first  obtuse marginal the first obtuse marginal has a 90-95% stenosis. There  are several branch vessels in the posterior aspect of the heart that  make up the posterior descending artery, they have mild to moderate  disease.  Ventriculography demonstrates ejection fraction of 55-60%.    Aortic root injection demonstrates a dilated aorta with severe aortic  insufficiency.    Conclusion:   1. Severe aortic insufficiency.  2. 90-95% stenosis in the first obtuse marginal.  3. 40-50% mid LAD stenosis.  4. 30% proximal circumflex stenosis.           (-) taking anticoagulants/antiplatelets   METS/Exercise Tolerance:     Hematologic:     (+) Anemia, -      Musculoskeletal: Comment:     positive left knee aspiration cultures for staph aureus      (+) , , other musculoskeletal-       GI/Hepatic:        (-) GERD   Renal/Genitourinary: Comment:     Right pyelonephritis         (+) chronic renal disease,       Endo: Comment:     Insulin gtt        (+) Obesity, .      Psychiatric:  - neg psychiatric ROS       Infectious Disease: Comment:   Sepsis - Blood cultures with S.Aureus    -On Levaquin, nafcillin and gentamicin       (+) SBE Prophylaxis (infective endocarditis. On antibiotics), Other Infectious Disease       Malignancy:      - no malignancy   Other:                 OWEN (10/7/2017):  Interpretation Summary  There is a bioprosthetic valve in the aortic position. The leaflets  appear to open well. There is trace central AI, no evidence for paravalvular leak. No evidence for valve dehiscence. A long and thin (about 2cm in length) freely mobile fibrinous strand is attached to the one of the valve strouts on the aortic side of the valve. In addition, there appears to be a small, 0.3cm mass on the ventricular side of the leaflets, only visualized in a limited number of tomograms. There is large, circumferential, contained echodense material surrounding the aortic root. It originates at the level of the valve and extends along the aorta graft. There is no fluid or evidence of blood flow into this area, may represent organized thrombus in the right clinical setting. There is no evidence for dissection in the visualized portions of the aorta. No evidence for aortic stenosis    Left ventricular function is normal.The EF is 60-65%. Left ventricular size is normal. Right ventricular function, chamber size, wall motion, and thickness are normal.  The left atrial appendage is normal. It is free of spontaneous echo contrast and thrombus.  Prolapse of the posterior mitral valve leaflet is noted. Trace mitral insufficiency is present.  No pericardial effusion is present.     CT C/A/P:  IMPRESSION:  1. Fluid collection with enhancing margin around the aortic root and  ascending thoracic aorta possibly postsurgical hematoma, dissection or  infected fluid collection. A discrete dissection flap is not  definitely identified.  2. Probable right pyelonephritis with decreased cortical enhancement  posterior mid right kidney. Kidneys are otherwise unremarkable.  Remaining abdominal and pelvic organs are within normal limits. No  diverticulitis or appendicitis. A few scattered liver cysts are  present of doubtful clinical significance.  3. No evidence of pneumonia or effusion. Changes of old granulomatous  disease are noted.  4. No evidence of intra-abdominal or pelvic abscess.                   Anesthesia  Plan      History & Physical Review  History and physical reviewed and following examination; no interval change.    ASA Status:  4 emergent.    NPO Status:  > 8 hours (feeding tube)    Plan for General and ETT (ETT in situ) with Inhalation induction. Maintenance will be Balanced.    PONV prophylaxis:  Ondansetron (or other 5HT-3)  Additional equipment: Arterial Line, Central Line, 2nd IV and CVP     63 yo male s/p aortic valve replacement with bioprosthetic valve and aortic aneurysm repair in 2016 now with admission for diffuse encephalopathy likely secondary to sepsis with comorbid conditions of blood borne sepsis, concerning for endocarditis with embolic shower with septic knee joint, pyelonephritis, and stroke with MRI findings of cardioembolic stroke to different regions (MCA, occipital, cerebellar) and fluid collection around aorta concerning for infectious effusion vs dissection.    His EF is preserved and he is a surgical candidate for the proposed orthopedic arthroscopy of his knee for septic joint. Given the patient is intubated, sedated, and with an arterial line and central line - unless the TTE shows new or unstable cardiovascular function or burdeon from a possible dissection, patient will be a feasible candidate for this proposed orthopedic surgery. He is at high risk for further complications but this may be necessary if underlying septic joint is a concern.    Infusions:  -Fentanyl and propofol gtt  -Pressor needs intermittently with norepinephrine for hypotension  -Insulin gtt        Postoperative Care  Postoperative pain management:  IV analgesics.      Consents  Anesthetic plan, risks, benefits and alternatives discussed with:  Implied consent/emergency and legal guardian..        ________________________________________________________________    Assessment: Cricket Miguel is a 62 year old male with history of AVR in 2016 now with concern for endocarditis with embolic phenomenon, concern for  septic joint who is scheduled for Procedure(s):  Arthroscopic Incision and Drainage of Left Knee - Wound Class:  with Dr. Munoz.    Plan: To be discussed with staff. As noted above    [place], [place]-2  [phone]    Procedure: Procedure(s):  Arthroscopic Incision and Drainage of Left Knee - Wound Class:     PMHx/PSHx:  Past Medical History:   Diagnosis Date     AI (aortic insufficiency)      Aortic root dilatation (H)      AR (aortic regurgitation)     severe     Cardiomyopathy (H)      CHF (congestive heart failure), NYHA class II (H)      COPD, mild (H)     spirometry 12/16     Heart murmur      Hyperlipidemia LDL goal <70 10/31/2010     Hypertension goal BP (blood pressure) < 140/90      Inguinal hernia      left lung nodule  1/29/2008     Major depressive disorder, single episode, moderate (H)      Psoriasis childhood     Tobacco use disorder      Past Surgical History:   Procedure Laterality Date     HC SHOULDER ARTHROSCOPY, DX  2001    Arthroscopy, Shoulder RT Dr OSIRIS Andersen     HC SHOULDER ARTHROSCOPY, DX  1/04    LT arthroscopy Dr OSIRIS Andersen     HERNIA REPAIR, UMBILICAL  1/08    incarcerated - dr rockwell     HERNIORRHAPHY INGUINAL  12/21/2012    HERNIORRHAPHY INGUINAL;  Right Inguinal Hernia Repair with mesh ;  Surgeon: Mello Rockwell MD;  Location: [facility] OR     REPLACE VALVE AORTIC N/A 5/17/2016    REPLACE VALVE AORTIC - Dr Bowers     ROTATOR CUFF REPAIR RT/LT  11/10    Rt arthroscopy - Dr OSIRIS Andersen     SURGICAL HISTORY OF -   5/16    AVR, severe AR, aortic root repair     Social History   Substance Use Topics     Smoking status: Former Smoker     Packs/day: 1.00     Years: 35.00     Quit date: 4/1/2016     Smokeless tobacco: Never Used      Comment: 4/2016     Alcohol use 0.0 oz/week     0 Standard drinks or equivalent per week      Comment: 1 drink per week avg, seldom     Allergies   Allergen Reactions     Lisinopril Swelling     One-sided facial swelling after being on lisinopril/HCTZ for one week.       Prescriptions Prior to Admission   Medication Sig Dispense Refill Last Dose     hydrochlorothiazide (HYDRODIURIL) 25 MG tablet Take 1 tablet (25 mg) by mouth daily 90 tablet 1 Unknown at Unknown time     atorvastatin (LIPITOR) 40 MG tablet Take 1 tablet (40 mg) by mouth daily 90 tablet 1 Unknown at Unknown time     amLODIPine (NORVASC) 5 MG tablet Take 1 tablet (5 mg) by mouth 2 times daily 180 tablet 3 Unknown at Unknown time     furosemide (LASIX) 20 MG tablet Take 1 tablet (20 mg) by mouth daily as needed If blood pressure above 130 90 tablet 3 Unknown at Unknown time     carvedilol (COREG) 12.5 MG tablet Take 1 tablet (12.5 mg) by mouth 2 times daily (with meals) 180 tablet 3 Unknown at Unknown time     aspirin  MG tablet Take 1 tablet (325 mg) by mouth daily 60 tablet 3 Unknown at Unknown time     No current outpatient prescriptions on file.     Objective:   Lab Results   Component Value Date    WBC 11.0 10/07/2017    HGB 13.1 (L) 10/07/2017    HCT 38.8 (L) 10/07/2017     (L) 10/07/2017     10/07/2017    POTASSIUM 3.9 10/07/2017    CHLORIDE 106 10/07/2017    CO2 24 10/07/2017    BUN 18 10/07/2017    CR 1.06 10/07/2017     (H) 10/07/2017    SED 6 05/06/2013    DD 1.7 (H) 04/25/2016    NTBNPI 2768 (H) 04/25/2016    NTBNP 1302 (H) 06/03/2016    TROPONIN <0.07 12/05/2005    TROPI 1.242 (HH) 10/07/2017    AST 24 10/06/2017    ALT 33 10/06/2017    ALKPHOS 85 10/06/2017    BILITOTAL 1.5 (H) 10/06/2017    INR 1.25 (H) 10/06/2017

## 2017-10-08 NOTE — PROGRESS NOTES
CVTS Progress Note     Dissection of aorta, unspecified portion of aorta (H)  Sepsis due to other etiology (H)  NSTEMI (non-ST elevated myocardial infarction) (H)  Hypotension, unspecified hypotension type    Subjective: no acute events since arrival, strokes identified on MRI, CT shows interval expected edema. No intervention warranted per neurology, remained lightly sedated overnight, not following commands    Objective:  Temp:  [90.5  F (32.5  C)-102.2  F (39  C)] 97.6  F (36.4  C)  Heart Rate:  [] 90  Resp:  [18] 18  MAP:  [68 mmHg-127 mmHg] 121 mmHg  Arterial Line BP: ()/(54-96) 180/94  FiO2 (%):  [40 %] 40 %  SpO2:  [94 %-99 %] 98 %    Ventilation Mode: CMV/AC  FiO2 (%): 40 %  Rate Set (breaths/minute): 18 breaths/min  Tidal Volume Set (mL): 500 mL  PEEP (cm H2O): 5 cmH2O  Oxygen Concentration (%): 40 %  Resp: 18    I/O last 3 completed shifts:  In: 2460.7 [I.V.:2370.7; NG/GT:90]  Out: 3375 [Urine:3025; Emesis/NG output:350]    PE:  General: Intubated unresponsive  Neck: Supple, no tracheal deviation  Cardiovascular: RRR  Pumonary: Non labored breathing on MV  Abdomen: Soft, non distended, non tender.   Ext: W/o edema, wwp  Neuro: no awakening to verbal or painful stimuli, spontaneous left upper extremity movement, extension of right upper extremity.          BMP  Recent Labs  Lab 10/08/17  0328 10/07/17  1333 10/07/17  0422 10/06/17  1859 10/06/17  1322  10/06/17  1020     --  138  --  138  --  134   POTASSIUM 3.4  --  3.9 3.2* 3.4  --  3.2*   CHLORIDE 106  --  106  --  105  --  96   CO2 26  --  24  --  24  --  29   BUN 14  --  18  --  18  --  20   CR 1.10  --  1.06  --  1.07  --  1.33*   *  --  138*  --  162*  --  130*   MAG 2.1 2.1 2.2 2.0 1.5*  < >  --    PHOS 2.4* 1.6* 2.4* 1.5* 2.7  < >  --    < > = values in this interval not displayed.  CBC  Recent Labs  Lab 10/08/17  0328 10/07/17  0422 10/06/17  1322 10/06/17  1020   WBC 11.8* 11.0 14.2* 17.1*   HGB 13.3 13.1* 13.0* 15.0    * 132* 150 185     INR/PTT  Recent Labs  Lab 10/08/17  0328 10/06/17  1322 10/06/17  1020   INR 1.03 1.25* 1.23*   PTT  --  32 32     ABG  Recent Labs  Lab 10/08/17  0328 10/07/17  0651 10/06/17  1859 10/06/17  1327   PH 7.50* 7.46* 7.48* 7.33*   PCO2 35 36 35 43   PO2 83 95 73* 103   HCO3 27 26 26 23     LFT  Recent Labs  Lab 10/06/17  1020   AST 24   ALT 33   ALKPHOS 85   BILITOTAL 1.5*   ALBUMIN 2.8*       A/P   62 year old with history of AVR and ascending aortic repair in May 2016 presented with acute mental status changes found to have large moises-aortic fluid collection and multiple strokes likely septic emboli.    Neuro: tylenol for fevers, decrease sedation and Q1H neuro checks, appreciate neurology consult, obtain repeat HCT tonight to check for interval development of edema. Will obtain daily CTH. Neurology considering Cerebral Angio.   Resp: minimal vent settings, Rate reduced to 14 due to alkalosis  CV: hold ASA per neuro.  GI/FEN: trickle tube feeds via OG today, normonatremia, bowel regimen, postpyloric feeding tube tomorrow.   Renal: good urine output, no indication for diuresis today, will give 500 of NS due to metabolic alkalosis  Endo: Insulin gtt   ID: vanco and gentamicin for positive blood cultures, ID following, ortho tapped left knee effusion with ++WBC, follow up recommendations  Heme: no bleeding concerns at this time  Prophylaxis: H2 blocker, hold heparin per neuro recs  Lines: central line and arterial line from yesterday, continue  Dispo: CV-ICU    Awais Ma

## 2017-10-08 NOTE — PROGRESS NOTES
S/24hr events:   OWEN: mass on leaflets, stranding, no pericardial effusion.   CT head: expected changes of previously visualized infarcts, no hemorrhagic changes.   Patient remained stable overnight.     Gen: intubated, sedated but withdraws to pain.  BLE with significant mottling. Left knee is warmer than contralateral side, but very mild effusion, no erythema. Tolerates full PROM but grimacing. Feet cool. Unable to assess sensory or motor function due to sedation but spontaneously flexing and extending knees and performs ankle DF/PF.     Imaging: L knee XR reveals no fracture, no effusion. Mild medial joint space narrowing.     Left knee aspiration 10/6/17 positive for staph aureus.     Assessment: 63yo male with staph aureus bacteremia, possible prosthetic valve endocarditis, periaortic fluid collection, right pyelonephritis and brain infarcts, possibly due to septic emboli with newly positive left knee aspiration cultures for staph aureus.     Cell count was very low, not classic for infection (cell count 3897) however 97% neutrophils and cultures positive.     Plan  -Plan to proceed with left knee arthroscopic debridement this morning. Discussed with CVTS, they prefer the knee be taken care of as soon as possible to decrease bacterial load/sources of infection prior to any cardiac intervention.   -Informed consent signed by significant other Meghna and in chart.   -Anesthesia consult complete and cleared for surgery.     Discussed with Dr. Alexander Palacios PGY-4  Orthopedic Surgery  [phone]

## 2017-10-08 NOTE — PLAN OF CARE
Problem: Patient Care Overview  Goal: Plan of Care/Patient Progress Review  PT 4AB: Orders received for PT evaluation. Pt in OR, PT evaluation rescheduled.

## 2017-10-08 NOTE — ANESTHESIA CARE TRANSFER NOTE
Patient: Cricket Miguel    Procedure(s):  Arthroscopic Incision and Drainage of Left Knee - Wound Class: IV-Dirty or Infected    Diagnosis: Infected Knee  Diagnosis Additional Information: No value filed.    Anesthesia Type:   General, ETT     Note:  Airway :ETT  Patient transferred to:ICU  Comments: Anesthesia Care Transfer Note    Patient: Cricket Miguel    Transferred to: ICU    Patient vital signs: stable    Airway: ETT    Monitors on, on vent, sedated, VSS    Christian Watts CRNA  10/8/2017 9:45 AM          Vitals: (Last set prior to Anesthesia Care Transfer)    CRNA VITALS  10/8/2017 0905 - 10/8/2017 0945      10/8/2017             Resp Rate (observed): 12                Electronically Signed By: BIBI Higginbotham CRNA  October 8, 2017  9:45 AM

## 2017-10-08 NOTE — PROGRESS NOTES
CVTS Progress Note     Dissection of aorta, unspecified portion of aorta (H)  Sepsis due to other etiology (H)  NSTEMI (non-ST elevated myocardial infarction) (H)  Hypotension, unspecified hypotension type    Subjective: no acute events since arrival, strokes identified on MRI, CT shows interval expected edema. No intervention warranted per neurology, remained lightly sedated overnight, not following commands    Objective:  Temp:  [90.5  F (32.5  C)-102.2  F (39  C)] 97.6  F (36.4  C)  Heart Rate:  [] 90  Resp:  [18] 18  MAP:  [68 mmHg-127 mmHg] 121 mmHg  Arterial Line BP: ()/(54-96) 180/94  FiO2 (%):  [40 %] 40 %  SpO2:  [94 %-99 %] 98 %    Ventilation Mode: CMV/AC  FiO2 (%): 40 %  Rate Set (breaths/minute): 18 breaths/min  Tidal Volume Set (mL): 500 mL  PEEP (cm H2O): 5 cmH2O  Oxygen Concentration (%): 40 %  Resp: 18    I/O last 3 completed shifts:  In: 2460.7 [I.V.:2370.7; NG/GT:90]  Out: 3375 [Urine:3025; Emesis/NG output:350]    PE:  General: Intubated unresponsive  Neck: Supple, no tracheal deviation  Cardiovascular: RRR  Pumonary: Non labored breathing on MV  Abdomen: Soft, non distended, non tender.   Ext: W/o edema, wwp  Neuro: no awakening to verbal or painful stimuli, spontaneous left upper extremity movement, extension of right upper extremity.     BMP  Recent Labs  Lab 10/08/17  0328 10/07/17  1333 10/07/17  0422 10/06/17  1859 10/06/17  1322  10/06/17  1020     --  138  --  138  --  134   POTASSIUM 3.4  --  3.9 3.2* 3.4  --  3.2*   CHLORIDE 106  --  106  --  105  --  96   CO2 26  --  24  --  24  --  29   BUN 14  --  18  --  18  --  20   CR 1.10  --  1.06  --  1.07  --  1.33*   *  --  138*  --  162*  --  130*   MAG 2.1 2.1 2.2 2.0 1.5*  < >  --    PHOS 2.4* 1.6* 2.4* 1.5* 2.7  < >  --    < > = values in this interval not displayed.  CBC  Recent Labs  Lab 10/08/17  0328 10/07/17  0422 10/06/17  1322 10/06/17  1020   WBC 11.8* 11.0 14.2* 17.1*   HGB 13.3 13.1* 13.0* 15.0   PLT  138* 132* 150 185     INR/PTT  Recent Labs  Lab 10/08/17  0328 10/06/17  1322 10/06/17  1020   INR 1.03 1.25* 1.23*   PTT  --  32 32     ABG  Recent Labs  Lab 10/08/17  0328 10/07/17  0651 10/06/17  1859 10/06/17  1327   PH 7.50* 7.46* 7.48* 7.33*   PCO2 35 36 35 43   PO2 83 95 73* 103   HCO3 27 26 26 23     LFT  Recent Labs  Lab 10/06/17  1020   AST 24   ALT 33   ALKPHOS 85   BILITOTAL 1.5*   ALBUMIN 2.8*       A/P   62 year old with history of AVR and ascending aortic repair in May 2016 presented with acute mental status changes found to have large moises-aortic fluid collection and multiple strokes likely septic emboli.    Neuro: tylenol for fevers, decrease sedation and Q1H neuro checks, appreciate neurology consult, obtain repeat HCT tonight to check for interval development of edema. Will obtain daily CTH. Neurology considering Cerebral Angio.   Resp: minimal vent settings, Rate reduced to 14 due to alkalosis  CV: hold ASA per neuro.  GI/FEN: trickle tube feeds via OG today, normonatremia, bowel regimen, postpyloric feeding tube tomorrow.   Renal: good urine output, no indication for diuresis today, will give 500 of NS due to metabolic alkalosis  Endo: Insulin gtt   ID: vanco and gentamicin for positive blood cultures, ID following, ortho tapped left knee effusion with ++WBC, follow up recommendations  Heme: no bleeding concerns at this time  Prophylaxis: H2 blocker, hold heparin per neuro recs  Lines: central line and arterial line from yesterday, continue  Dispo: CV-ICU    Awais Ma

## 2017-10-08 NOTE — PROGRESS NOTES
CLINICAL NUTRITION SERVICES    NEW FINDINGS   -TF Assess/Order consult placed again to start TFs @ 10 ml/hr.   -EN access/tolerance: OGT placed to LIS so TFs not started 10/7.    Interventions  Collaboration with other providers - Discussed plan with CVTS; plan is to resume @10 ml/hr as OGT residuals have decreased. Team ordered TFs to restart via OGT. Planning to address approp EN access on weekday when radiologist/RD avail to place. For now, would recommend assessing GRVs q 4 hrs while feeding gastrically and holding TFs if >500 ml.    Nutrition will continue to follow per protocol.     Jero Fay RD, LD  [place] Pager: [phone]  4E Wkday Pager: [phone]

## 2017-10-08 NOTE — PROCEDURES
OWEN results (study did not cross over to Epic yet):    Interpretation Summary  There is a bioprosthetic valve in the aortic position. The leaflets appear to open well. There is trace central AI, no evidence for paravalvular leak. No evidence for valve dehiscence. A long and thin (about 2cm in length) freely mobile fibrinous strand is attached to the one of the valve strouts on the aortic side of the valve. In addition, there appears to be a small, 0.3cm mass on the ventricular side of the leaflets, only visualized in a limited number of tomograms. There is large, circumferential, contained echodense material surrounding the aortic root. It originates at the level of the valve and extends along the aorta graft. There is no fluid or evidence of blood flow into this area, may represent organized thrombus in the right clinical setting. There is no evidence for dissection in the visualized portions of the aorta. No evidence for aortic stenosis    Left ventricular function is normal.The EF is 60-65%. Left ventricular size is normal. Right ventricular function, chamber size, wall motion, and thickness are normal.  The left atrial appendage is normal. It is free of spontaneous echo contrast and thrombus.  Prolapse of the posterior mitral valve leaflet is noted. Trace mitral insufficiency is present.  No pericardial effusion is present.     Procedure  Transesophageal Echocardiogram with color and spectral Doppler performed. Procedure location Patient Floor. The procedure was performed on Patient Floor. Informed consent for Transesophegeal echo obtained. Patient was sedated using Fentanyl 50 mcg. The Transducer was inserted without difficulty . The patient tolerated the procedure well. Complications None. Patient intubated on floor 4A. 50mcg of fentanyl was given as well a propofol bumps x2, each 20mg. Total sedation time: 30 minutes of continuous bedside 1:1 monitoring. ECG shows normal sinus rhythm.   Left  Ventricle  Maximum wall thickness is normal. No regional wall motion abnormalities are seen. Left ventricular function is normal.The EF is 60-65%. Left ventricular size is normal.   Right Ventricle  Right ventricular function, chamber size, wall motion, and thickness are normal.   Atria  The left atrial appendage is normal. It is free of spontaneous echo contrast and thrombus. The left atrial appendage Doppler velocities are normal.   Mitral Valve  Mitral leaflet thickness is normal. Prolapse of the posterior mitral valve leaflet is noted. Trace mitral insufficiency is present.   Aortic Valve  There is a bioprosthetic valve in the aortic position. The leaflets appear to open well. There is trace central AI, no evidence for paravalvular leak. No evidence for valve dehiscence. A long and thin (about 2cm in length) freely mobile fibrinous strand is attached to the one of the valve strouts on the aortic side of the valve. In addition, there appears to be a small, 0.3cm mass on the ventricular side of the leaflets, only visualized in a limited number of tomograms.   There is large, circumferential, contained echodense material surrounding the aortic root. It originates at the level of the valve and extends along the aorta graft. There is no fluid or evidence of blood flow into this area, may represent organized thrombus in the right clinical setting. There is no evidence for dissection in the visualized portions of the aorta. No evidence for aortic stenosis.   Tricuspid Valve  The tricuspid valve is normal. Trace tricuspid insufficiency is present.   Pulmonic Valve  The pulmonic valve is normal.   Vessels  Aorta graft noted.   Pericardium  No pericardial effusion is present.       Milad Malloy MD

## 2017-10-09 ENCOUNTER — APPOINTMENT (OUTPATIENT)
Dept: PHYSICAL THERAPY | Facility: CLINIC | Age: 62
DRG: 004 | End: 2017-10-09
Attending: THORACIC SURGERY (CARDIOTHORACIC VASCULAR SURGERY)
Payer: COMMERCIAL

## 2017-10-09 ENCOUNTER — APPOINTMENT (OUTPATIENT)
Dept: CT IMAGING | Facility: CLINIC | Age: 62
DRG: 004 | End: 2017-10-09
Attending: PSYCHIATRY & NEUROLOGY
Payer: COMMERCIAL

## 2017-10-09 ENCOUNTER — APPOINTMENT (OUTPATIENT)
Dept: GENERAL RADIOLOGY | Facility: CLINIC | Age: 62
DRG: 004 | End: 2017-10-09
Attending: PSYCHIATRY & NEUROLOGY
Payer: COMMERCIAL

## 2017-10-09 ENCOUNTER — ALLIED HEALTH/NURSE VISIT (OUTPATIENT)
Dept: NEUROLOGY | Facility: CLINIC | Age: 62
DRG: 004 | End: 2017-10-09
Attending: PSYCHIATRY & NEUROLOGY
Payer: COMMERCIAL

## 2017-10-09 DIAGNOSIS — G93.40 ENCEPHALOPATHY: Primary | ICD-10-CM

## 2017-10-09 LAB
ANION GAP SERPL CALCULATED.3IONS-SCNC: 9 MMOL/L (ref 3–14)
ANION GAP SERPL CALCULATED.3IONS-SCNC: 9 MMOL/L (ref 3–14)
BACTERIA SPEC CULT: ABNORMAL
BACTERIA SPEC CULT: ABNORMAL
BASE EXCESS BLDA CALC-SCNC: 1 MMOL/L
BASE EXCESS BLDA CALC-SCNC: 2 MMOL/L
BASE EXCESS BLDA CALC-SCNC: 3.4 MMOL/L
BASE EXCESS BLDA CALC-SCNC: 4.2 MMOL/L
BUN SERPL-MCNC: 21 MG/DL (ref 7–30)
BUN SERPL-MCNC: 32 MG/DL (ref 7–30)
CALCIUM SERPL-MCNC: 8.2 MG/DL (ref 8.5–10.1)
CALCIUM SERPL-MCNC: 8.6 MG/DL (ref 8.5–10.1)
CHLORIDE SERPL-SCNC: 112 MMOL/L (ref 94–109)
CHLORIDE SERPL-SCNC: 113 MMOL/L (ref 94–109)
CO2 SERPL-SCNC: 25 MMOL/L (ref 20–32)
CO2 SERPL-SCNC: 26 MMOL/L (ref 20–32)
CREAT SERPL-MCNC: 1.46 MG/DL (ref 0.66–1.25)
CREAT SERPL-MCNC: 2.4 MG/DL (ref 0.66–1.25)
CRP SERPL-MCNC: 240 MG/L (ref 0–8)
ERYTHROCYTE [DISTWIDTH] IN BLOOD BY AUTOMATED COUNT: 15.4 % (ref 10–15)
GENTAMICIN SERPL-MCNC: 10.1 MG/L
GENTAMICIN SERPL-MCNC: 6.3 MG/L
GFR SERPL CREATININE-BSD FRML MDRD: 27 ML/MIN/1.7M2
GFR SERPL CREATININE-BSD FRML MDRD: 49 ML/MIN/1.7M2
GLUCOSE SERPL-MCNC: 140 MG/DL (ref 70–99)
GLUCOSE SERPL-MCNC: 154 MG/DL (ref 70–99)
HCO3 BLD-SCNC: 26 MMOL/L (ref 21–28)
HCO3 BLD-SCNC: 27 MMOL/L (ref 21–28)
HCO3 BLD-SCNC: 27 MMOL/L (ref 21–28)
HCO3 BLD-SCNC: 28 MMOL/L (ref 21–28)
HCT VFR BLD AUTO: 42.2 % (ref 40–53)
HGB BLD-MCNC: 13.7 G/DL (ref 13.3–17.7)
INTERPRETATION ECG - MUSE: NORMAL
LACTATE BLD-SCNC: 1.8 MMOL/L (ref 0.7–2)
MAGNESIUM SERPL-MCNC: 2.1 MG/DL (ref 1.6–2.3)
MAGNESIUM SERPL-MCNC: 2.1 MG/DL (ref 1.6–2.3)
MCH RBC QN AUTO: 29.9 PG (ref 26.5–33)
MCHC RBC AUTO-ENTMCNC: 32.5 G/DL (ref 31.5–36.5)
MCV RBC AUTO: 92 FL (ref 78–100)
O2/TOTAL GAS SETTING VFR VENT: 50 %
O2/TOTAL GAS SETTING VFR VENT: 60 %
OSMOLALITY SERPL: 318 MMOL/KG (ref 280–301)
OXYHGB MFR BLD: 95 % (ref 92–100)
OXYHGB MFR BLD: 96 % (ref 92–100)
OXYHGB MFR BLD: 97 % (ref 92–100)
PCO2 BLD: 38 MM HG (ref 35–45)
PCO2 BLD: 38 MM HG (ref 35–45)
PCO2 BLD: 41 MM HG (ref 35–45)
PCO2 BLD: 44 MM HG (ref 35–45)
PH BLD: 7.39 PH (ref 7.35–7.45)
PH BLD: 7.42 PH (ref 7.35–7.45)
PH BLD: 7.47 PH (ref 7.35–7.45)
PH BLD: 7.47 PH (ref 7.35–7.45)
PHOSPHATE SERPL-MCNC: 2.5 MG/DL (ref 2.5–4.5)
PLATELET # BLD AUTO: 125 10E9/L (ref 150–450)
PLATELET # BLD AUTO: 146 10E9/L (ref 150–450)
PO2 BLD: 113 MM HG (ref 80–105)
PO2 BLD: 139 MM HG (ref 80–105)
PO2 BLD: 63 MM HG (ref 80–105)
PO2 BLD: 90 MM HG (ref 80–105)
POTASSIUM SERPL-SCNC: 3.4 MMOL/L (ref 3.4–5.3)
POTASSIUM SERPL-SCNC: 3.5 MMOL/L (ref 3.4–5.3)
POTASSIUM SERPL-SCNC: 4.3 MMOL/L (ref 3.4–5.3)
PREALB SERPL IA-MCNC: 7 MG/DL (ref 15–45)
PREALB SERPL IA-MCNC: 7 MG/DL (ref 15–45)
RBC # BLD AUTO: 4.58 10E12/L (ref 4.4–5.9)
SODIUM SERPL-SCNC: 146 MMOL/L (ref 133–144)
SPECIMEN SOURCE: ABNORMAL
WBC # BLD AUTO: 3.9 10E9/L (ref 4–11)

## 2017-10-09 PROCEDURE — 25000128 H RX IP 250 OP 636: Performed by: STUDENT IN AN ORGANIZED HEALTH CARE EDUCATION/TRAINING PROGRAM

## 2017-10-09 PROCEDURE — 83735 ASSAY OF MAGNESIUM: CPT | Performed by: SURGERY

## 2017-10-09 PROCEDURE — 25000132 ZZH RX MED GY IP 250 OP 250 PS 637: Performed by: STUDENT IN AN ORGANIZED HEALTH CARE EDUCATION/TRAINING PROGRAM

## 2017-10-09 PROCEDURE — 25000125 ZZHC RX 250: Performed by: THORACIC SURGERY (CARDIOTHORACIC VASCULAR SURGERY)

## 2017-10-09 PROCEDURE — 84134 ASSAY OF PREALBUMIN: CPT | Performed by: SURGERY

## 2017-10-09 PROCEDURE — 93010 ELECTROCARDIOGRAM REPORT: CPT | Performed by: INTERNAL MEDICINE

## 2017-10-09 PROCEDURE — 93005 ELECTROCARDIOGRAM TRACING: CPT

## 2017-10-09 PROCEDURE — 40000986 XR ABDOMEN PORT F1 VW

## 2017-10-09 PROCEDURE — 25000132 ZZH RX MED GY IP 250 OP 250 PS 637: Performed by: SURGERY

## 2017-10-09 PROCEDURE — 40000281 ZZH STATISTIC TRANSPORT TIME EA 15 MIN

## 2017-10-09 PROCEDURE — 25000132 ZZH RX MED GY IP 250 OP 250 PS 637: Performed by: THORACIC SURGERY (CARDIOTHORACIC VASCULAR SURGERY)

## 2017-10-09 PROCEDURE — 40000014 ZZH STATISTIC ARTERIAL MONITORING DAILY

## 2017-10-09 PROCEDURE — 83605 ASSAY OF LACTIC ACID: CPT | Performed by: ORTHOPAEDIC SURGERY

## 2017-10-09 PROCEDURE — 80048 BASIC METABOLIC PNL TOTAL CA: CPT | Performed by: THORACIC SURGERY (CARDIOTHORACIC VASCULAR SURGERY)

## 2017-10-09 PROCEDURE — 83735 ASSAY OF MAGNESIUM: CPT | Performed by: THORACIC SURGERY (CARDIOTHORACIC VASCULAR SURGERY)

## 2017-10-09 PROCEDURE — 99291 CRITICAL CARE FIRST HOUR: CPT | Mod: GC | Performed by: INTERNAL MEDICINE

## 2017-10-09 PROCEDURE — 25000128 H RX IP 250 OP 636: Performed by: SURGERY

## 2017-10-09 PROCEDURE — 82803 BLOOD GASES ANY COMBINATION: CPT | Performed by: SURGERY

## 2017-10-09 PROCEDURE — S0028 INJECTION, FAMOTIDINE, 20 MG: HCPCS | Performed by: THORACIC SURGERY (CARDIOTHORACIC VASCULAR SURGERY)

## 2017-10-09 PROCEDURE — 84132 ASSAY OF SERUM POTASSIUM: CPT | Performed by: THORACIC SURGERY (CARDIOTHORACIC VASCULAR SURGERY)

## 2017-10-09 PROCEDURE — 97161 PT EVAL LOW COMPLEX 20 MIN: CPT | Mod: GP | Performed by: PHYSICAL THERAPIST

## 2017-10-09 PROCEDURE — 40000193 ZZH STATISTIC PT WARD VISIT: Performed by: PHYSICAL THERAPIST

## 2017-10-09 PROCEDURE — 85027 COMPLETE CBC AUTOMATED: CPT | Performed by: SURGERY

## 2017-10-09 PROCEDURE — 27210437 ZZH NUTRITION PRODUCT SEMIELEM INTERMED LITER

## 2017-10-09 PROCEDURE — 25000128 H RX IP 250 OP 636: Performed by: THORACIC SURGERY (CARDIOTHORACIC VASCULAR SURGERY)

## 2017-10-09 PROCEDURE — 25000125 ZZHC RX 250: Performed by: SURGERY

## 2017-10-09 PROCEDURE — 82805 BLOOD GASES W/O2 SATURATION: CPT | Performed by: THORACIC SURGERY (CARDIOTHORACIC VASCULAR SURGERY)

## 2017-10-09 PROCEDURE — 95951 ZZHC EEG VIDEO EACH 24 HR: CPT | Mod: ZF

## 2017-10-09 PROCEDURE — 97162 PT EVAL MOD COMPLEX 30 MIN: CPT | Mod: GP | Performed by: PHYSICAL THERAPIST

## 2017-10-09 PROCEDURE — 40000275 ZZH STATISTIC RCP TIME EA 10 MIN

## 2017-10-09 PROCEDURE — 86140 C-REACTIVE PROTEIN: CPT | Performed by: SURGERY

## 2017-10-09 PROCEDURE — 70450 CT HEAD/BRAIN W/O DYE: CPT

## 2017-10-09 PROCEDURE — 80048 BASIC METABOLIC PNL TOTAL CA: CPT | Performed by: SURGERY

## 2017-10-09 PROCEDURE — S0032 INJECTION, NAFCILLIN SODIUM: HCPCS | Performed by: SURGERY

## 2017-10-09 PROCEDURE — 84295 ASSAY OF SERUM SODIUM: CPT | Performed by: STUDENT IN AN ORGANIZED HEALTH CARE EDUCATION/TRAINING PROGRAM

## 2017-10-09 PROCEDURE — 44500 INTRO GASTROINTESTINAL TUBE: CPT | Performed by: DIETITIAN, REGISTERED

## 2017-10-09 PROCEDURE — 20000004 ZZH R&B ICU UMMC

## 2017-10-09 PROCEDURE — 80170 ASSAY OF GENTAMICIN: CPT | Performed by: THORACIC SURGERY (CARDIOTHORACIC VASCULAR SURGERY)

## 2017-10-09 PROCEDURE — 83930 ASSAY OF BLOOD OSMOLALITY: CPT | Performed by: STUDENT IN AN ORGANIZED HEALTH CARE EDUCATION/TRAINING PROGRAM

## 2017-10-09 PROCEDURE — 97110 THERAPEUTIC EXERCISES: CPT | Mod: GP | Performed by: PHYSICAL THERAPIST

## 2017-10-09 PROCEDURE — 94003 VENT MGMT INPAT SUBQ DAY: CPT

## 2017-10-09 PROCEDURE — 25000125 ZZHC RX 250: Performed by: STUDENT IN AN ORGANIZED HEALTH CARE EDUCATION/TRAINING PROGRAM

## 2017-10-09 PROCEDURE — 85049 AUTOMATED PLATELET COUNT: CPT | Performed by: STUDENT IN AN ORGANIZED HEALTH CARE EDUCATION/TRAINING PROGRAM

## 2017-10-09 PROCEDURE — 25800025 ZZH RX 258: Performed by: THORACIC SURGERY (CARDIOTHORACIC VASCULAR SURGERY)

## 2017-10-09 PROCEDURE — 84100 ASSAY OF PHOSPHORUS: CPT | Performed by: SURGERY

## 2017-10-09 PROCEDURE — 82805 BLOOD GASES W/O2 SATURATION: CPT | Performed by: ORTHOPAEDIC SURGERY

## 2017-10-09 RX ORDER — GENTAMICIN SULFATE 100 MG/100ML
100 INJECTION, SOLUTION INTRAVENOUS EVERY 24 HOURS
Status: DISCONTINUED | OUTPATIENT
Start: 2017-10-10 | End: 2017-10-11

## 2017-10-09 RX ORDER — SODIUM CHLORIDE, SODIUM LACTATE, POTASSIUM CHLORIDE, CALCIUM CHLORIDE 600; 310; 30; 20 MG/100ML; MG/100ML; MG/100ML; MG/100ML
INJECTION, SOLUTION INTRAVENOUS
Status: DISCONTINUED
Start: 2017-10-09 | End: 2017-10-11 | Stop reason: HOSPADM

## 2017-10-09 RX ORDER — 3% SODIUM CHLORIDE 3 G/100ML
INJECTION, SOLUTION INTRAVENOUS CONTINUOUS
Status: DISPENSED | OUTPATIENT
Start: 2017-10-09 | End: 2017-10-11

## 2017-10-09 RX ORDER — POLYETHYLENE GLYCOL 3350 17 G/17G
17 POWDER, FOR SOLUTION ORAL DAILY
Status: DISCONTINUED | OUTPATIENT
Start: 2017-10-09 | End: 2017-10-18

## 2017-10-09 RX ORDER — MIDAZOLAM (PF) 1 MG/ML IN 0.9 % SODIUM CHLORIDE INTRAVENOUS SOLUTION
1-8 CONTINUOUS
Status: DISCONTINUED | OUTPATIENT
Start: 2017-10-09 | End: 2017-10-09

## 2017-10-09 RX ORDER — LEVOFLOXACIN 5 MG/ML
750 INJECTION, SOLUTION INTRAVENOUS
Status: DISCONTINUED | OUTPATIENT
Start: 2017-10-10 | End: 2017-10-25

## 2017-10-09 RX ADMIN — NAFCILLIN SODIUM 2 G: 2 INJECTION, POWDER, LYOPHILIZED, FOR SOLUTION INTRAMUSCULAR; INTRAVENOUS at 13:29

## 2017-10-09 RX ADMIN — Medication 80 MG: at 20:29

## 2017-10-09 RX ADMIN — ACETAMINOPHEN 650 MG: 325 SOLUTION ORAL at 00:45

## 2017-10-09 RX ADMIN — POLYETHYLENE GLYCOL 3350 17 G: 17 POWDER, FOR SOLUTION ORAL at 07:47

## 2017-10-09 RX ADMIN — FENTANYL CITRATE 200 MCG/HR: 50 INJECTION, SOLUTION INTRAMUSCULAR; INTRAVENOUS at 19:49

## 2017-10-09 RX ADMIN — SODIUM CHLORIDE: 3 INJECTION, SOLUTION INTRAVENOUS at 19:09

## 2017-10-09 RX ADMIN — NAFCILLIN SODIUM 2 G: 2 INJECTION, POWDER, LYOPHILIZED, FOR SOLUTION INTRAMUSCULAR; INTRAVENOUS at 09:58

## 2017-10-09 RX ADMIN — FENTANYL CITRATE 75 MCG/HR: 50 INJECTION, SOLUTION INTRAMUSCULAR; INTRAVENOUS at 04:40

## 2017-10-09 RX ADMIN — POTASSIUM PHOSPHATE, MONOBASIC AND POTASSIUM PHOSPHATE, DIBASIC 10 MMOL: 224; 236 INJECTION, SOLUTION INTRAVENOUS at 05:16

## 2017-10-09 RX ADMIN — POTASSIUM CHLORIDE, DEXTROSE MONOHYDRATE AND SODIUM CHLORIDE: 150; 5; 450 INJECTION, SOLUTION INTRAVENOUS at 09:58

## 2017-10-09 RX ADMIN — SENNOSIDES AND DOCUSATE SODIUM 2 TABLET: 8.6; 5 TABLET ORAL at 07:47

## 2017-10-09 RX ADMIN — NAFCILLIN SODIUM 2 G: 2 INJECTION, POWDER, LYOPHILIZED, FOR SOLUTION INTRAMUSCULAR; INTRAVENOUS at 21:48

## 2017-10-09 RX ADMIN — GENTAMICIN SULFATE 230 MG: 40 INJECTION, SOLUTION INTRAMUSCULAR; INTRAVENOUS at 07:51

## 2017-10-09 RX ADMIN — NAFCILLIN SODIUM 2 G: 2 INJECTION, POWDER, LYOPHILIZED, FOR SOLUTION INTRAMUSCULAR; INTRAVENOUS at 06:13

## 2017-10-09 RX ADMIN — LIDOCAINE HYDROCHLORIDE 5 ML: 20 SOLUTION ORAL; TOPICAL at 13:27

## 2017-10-09 RX ADMIN — SENNOSIDES AND DOCUSATE SODIUM 1 TABLET: 8.6; 5 TABLET ORAL at 20:01

## 2017-10-09 RX ADMIN — POTASSIUM CHLORIDE 20 MEQ: 1.5 POWDER, FOR SOLUTION ORAL at 01:21

## 2017-10-09 RX ADMIN — MULTIVITAMIN 15 ML: LIQUID ORAL at 13:29

## 2017-10-09 RX ADMIN — HYDRALAZINE HYDROCHLORIDE 5 MG: 20 INJECTION INTRAMUSCULAR; INTRAVENOUS at 18:11

## 2017-10-09 RX ADMIN — SODIUM CHLORIDE, POTASSIUM CHLORIDE, SODIUM LACTATE AND CALCIUM CHLORIDE 500 ML: 600; 310; 30; 20 INJECTION, SOLUTION INTRAVENOUS at 10:32

## 2017-10-09 RX ADMIN — ACETAMINOPHEN 650 MG: 325 SOLUTION ORAL at 18:10

## 2017-10-09 RX ADMIN — POTASSIUM CHLORIDE 20 MEQ: 200 INJECTION, SOLUTION INTRAVENOUS at 04:26

## 2017-10-09 RX ADMIN — FAMOTIDINE 20 MG: 10 INJECTION, SOLUTION INTRAVENOUS at 09:58

## 2017-10-09 RX ADMIN — NAFCILLIN SODIUM 2 G: 2 INJECTION, POWDER, LYOPHILIZED, FOR SOLUTION INTRAMUSCULAR; INTRAVENOUS at 01:47

## 2017-10-09 RX ADMIN — PROPOFOL 10 MCG/KG/MIN: 10 INJECTION, EMULSION INTRAVENOUS at 13:27

## 2017-10-09 RX ADMIN — HYDRALAZINE HYDROCHLORIDE 10 MG: 20 INJECTION INTRAMUSCULAR; INTRAVENOUS at 01:53

## 2017-10-09 RX ADMIN — ACETAMINOPHEN 650 MG: 325 SOLUTION ORAL at 07:47

## 2017-10-09 RX ADMIN — NAFCILLIN SODIUM 2 G: 2 INJECTION, POWDER, LYOPHILIZED, FOR SOLUTION INTRAMUSCULAR; INTRAVENOUS at 18:10

## 2017-10-09 RX ADMIN — PROPOFOL 15 MCG/KG/MIN: 10 INJECTION, EMULSION INTRAVENOUS at 01:41

## 2017-10-09 ASSESSMENT — VISUAL ACUITY
OU: OTHER (SEE COMMENT)

## 2017-10-09 NOTE — PROGRESS NOTES
Orthopaedic Surgery Progress Note 10/09/2017    S: Intubated and sedated.     O:  Temp: 99.9  F (37.7  C) Temp src: Bladder     Heart Rate: 89 Resp: 15 SpO2: 96 % O2 Device: Mechanical Ventilator      Exam:  Gen: Intubated and sedated. Occasional movements of extremities.   Resp: Intubated  LLE: Stable mottled appearance of skin. Dressing in place with ACE, no drainage. Toes WWP, 2+DP. Smooth PROM of knee and ankle without grimacing.        Recent Labs  Lab 10/09/17  0316 10/08/17  0328 10/07/17  1333 10/07/17  0422 10/06/17  1859   WBC 3.9* 11.8*  --  11.0  --    HGB 13.7 13.3  --  13.1*  --    * 138*  --  132*  --    .0*  --  290.0*  --  310.0*       Culture results: pending results from 10/8    Assessment: Cricket Miguel is a 62 year old male s/p Left knee I&D on 10/8 with Dr. Munoz. Benign LLE exam. Primary team monitoring neuro status      Plan:  Ortho to do dressing change on POD4-5, keep clean and dry.  WBAT LLE  Diet per primary, no anticipated RTOR  DVT prophylaxis per primary  No bracing needed  Will follow cultures    Adiel Hernadez MD 10/09/2017  Orthopaedic Surgery Resident, PGY-1  Pager: [phone]    For questions about this patient, please attempt to contact me at my pager prior to contacting the orthopaedics resident on call. Thank you!

## 2017-10-09 NOTE — PROGRESS NOTES
Neuro: Pupils are equal and reactive 2-3mm. Pt moves LUE and LLE spontaneously but not to command. RLE will withdraw to pain. RUE will occasionally withdraw to pain. Overnight pt continued to be afebrile, 102-103. MD notified. RN gave PRN acetaminophen, placed ice packs and fan on patient.   CV: 90s overnight, episode of tachycardia overnight for about 2 hours with HR in 115s-120s. Pt was in a first degree block and then changed into an accelerated junctional rhythm. EKG was done, K and Mag were checked. MD notified. Pt also had an episode of HTN, SBPs 180s, that lasted about 30 minutes. RN used hydralazine. SBP goal <140. K and Phos replaced. WBC jumped from 11 to 3.2 this AM  Resp: Intubated, CMV 60%, 500, 14, PEEP 5. Clear lung sounds, white thick secretions. Pt O2 requirement increased overnight, he was 90-93% when he was on 40% FiO2. PaO2 63, FiO2 increased to 60%, RT aware. Repeat ABG obtained.   GI: OG to trickle feeds at 10 with 30cc FWF. Per CVTS TF do not need to be stopped for CT angio. Bowel meds given, no bowel movement.    : Espinoza in place, adequate UO  Skin: Mottled, pale dusky lower extremities. Left knee wrapped in ACE bandage. Bilateral feet are warm and pulses are palpable. Restraint continued, pt continues to pull at tube and ET tube with LUE.   Plan: CT angio sometime today. Family requesting care conference once all procedures and tests are complete.

## 2017-10-09 NOTE — PROGRESS NOTES
10/09/17 0857   Quick Adds   Type of Visit Initial PT Evaluation   Living Environment   Lives With significant other   Living Arrangements house   Home Accessibility stairs to enter home;stairs within home   Number of Stairs to Enter Home 1   Number of Stairs Within Home (3 up/down, also 13 to 2nd story, no railings present per S.O)   Living Environment Comment lives with S.O in [facility]   Self-Care   Dominant Hand left   Usual Activity Tolerance moderate   Current Activity Tolerance poor   Regular Exercise no   Equipment Currently Used at Home none   Activity/Exercise/Self-Care Comment S.O reports pt's fully IND with ADLs, IALDs. 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 ambulation;fall history   Prior Functional Level Comment Fully IND with mobility, household and community gait. Pt driving at baseline.    General Information   Onset of Illness/Injury or Date of Surgery - Date 10/06/17   Referring Physician Lucretia Munoz MD   Patient/Family Goals Statement S.O appreciative of the great care here at [facility], reports patient will require fewer services and recover maximally.    Pertinent History of Current Problem (include personal factors and/or comorbidities that impact the POC) 63 yo man with hx aortic valve replacement 2016 transferred to [facility] from [facility] with encephalopathy & fluid collection around aortic root and  "ascending aorta. Per family, patient had been feeling sick with \"flu-like symptoms\" on evening of 10/5/17. Around 8:30AM on 10/6/17, patients wife talked to him in bed and thought he seemed fine, except for the flu-like symptoms. Around 9:30 AM on 10/6 wife heard a \"thud\" and saw that patient fell out of bed. She called 911 & patient was taken to [facility] where he was intubated for airway protection. Initial CTH w/out acute pathology; CT chest/abd/pelv showed fluid collection with enhancing margin around aortic root & ascending aorta. Transferred to [facility] for further mgmt. s/p Left knee I&D on 10/8 with Dr. Munoz. 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   Precautions/Limitations Seizure; fall precautions;oxygen therapy device and L/min   Weight-Bearing Status - LLE weight-bearing as tolerated   General Info Comments per ortho, no L knee bracing needed   Cognitive Status Examination   Level of Consciousness sedated;intubated   Follows Commands and Answers Questions unable to follow commands;unresponsive   Pain Assessment   Patient Currently in Pain No  (no signs noted)   Range of Motion (ROM)   ROM Comment PROM intact all planes, B UEs, LEs; pt unable to participate in AROM   Strength   Strength Comments lacking any AROM strength in extremities, however pt on sedative, therefore unable to test   Bed Mobility   Bed Mobility Comments total assist for all bed mobility   Transfer Skills   Transfer Comments N/A per current medical status   Gait   Gait Comments unable to attempt   Coordination   Coordination Comments pt not following any commands to assess   General Therapy Interventions   Planned Therapy Interventions bed mobility training;gait training;neuromuscular re-education;ROM;strengthening;stretching;transfer training;progressive activity/exercise   Clinical Impression   Criteria for Skilled Therapeutic Intervention yes, treatment indicated   PT Diagnosis impaired " "activity tolerance   Influenced by the following impairments reduced AROM, strength, activity tolerance   Functional limitations due to impairments below baseline bed mobility, transfers, gait, ability to stand   Clinical Presentation Evolving/Changing   Clinical Presentation Rationale recent neuro status changes, L knee surgery; currently requiring ICU close monitoring per concerns   Clinical Decision Making (Complexity) Moderate complexity   Therapy Frequency` 3 times/week   Predicted Duration of Therapy Intervention (days/wks) 1 week   Anticipated Discharge Disposition Other (see comments)  (inpatient rehab facility)   Risk & Benefits of therapy have been explained Yes   Patient, Family & other staff in agreement with plan of care Yes   [facility] AM-PAC  \"6 Clicks\" V.2 Basic Mobility Inpatient Short Form   1. Turning from your back to your side while in a flat bed without using bedrails? 1 - Total   2. Moving from lying on your back to sitting on the side of a flat bed without using bedrails? 1 - Total   3. Moving to and from a bed to a chair (including a wheelchair)? 1 - Total   4. Standing up from a chair using your arms (e.g., wheelchair, or bedside chair)? 1 - Total   5. To walk in hospital room? 1 - Total   6. Climbing 3-5 steps with a railing? 1 - Total   Basic Mobility Raw Score (Score out of 24.Lower scores equate to lower levels of function) 6   Total Evaluation Time   Total Evaluation Time (Minutes) 8     "

## 2017-10-09 NOTE — PLAN OF CARE
Problem: Sepsis/Septic Shock (Adult)  Goal: Signs and Symptoms of Listed Potential Problems Will be Absent, Minimized or Managed (Sepsis/Septic Shock)  Signs and symptoms of listed potential problems will be absent, minimized or managed by discharge/transition of care (reference Sepsis/Septic Shock (Adult) CPG).   Outcome: No Change  Neuros remain unchanged. Repeat head CT this afternoon. q2 hour neuros. Tmax 100.  500mL LR bolus given this AM d/t low BPs and low UOP. Picked up t/o day.   Remains vented  NJ placement into the gastric, repeat tomorrow.   Pt agitated while off propofol, but pressures soft on propofol. Fentanyl increased to 150mcg.  Plan for CC in the near future. Peripheral stick for blood cultures daily. Monitor temp.  Will continue to monitor and notify MD of any acute concerns.

## 2017-10-09 NOTE — MR AVS SNAPSHOT
After Visit Summary   10/9/2017    Cricket Miguel    MRN: [id number]           Patient Information     Date Of Birth          1955        Visit Information        Provider Department      10/9/2017 7:00 AM [facility] EEG TECH 4 [facility] EEG        Today's Diagnoses     Encephalopathy    -  1       Follow-ups after your visit        Who to contact     Please call your clinic at [phone] to:    Ask questions about your health    Make or cancel appointments    Discuss your medicines    Learn about your test results    Speak to your doctor   If you have compliments or concerns about an experience at your clinic, or if you wish to file a complaint, please contact [facility] Patient Relations at [phone] or email us at [email or web address]         Additional Information About Your Visit        MyChart Information     [employer] gives you secure access to your electronic health record. If you see a primary care provider, you can also send messages to your care team and make appointments. If you have questions, please call your primary care clinic.  If you do not have a primary care provider, please call [phone] and they will assist you.      [employer] is an electronic gateway that provides easy, online access to your medical records. With [employer], you can request a clinic appointment, read your test results, renew a prescription or communicate with your care team.     To access your existing account, please contact your [facility] or call [phone] for assistance.        Care EveryWhere ID     This is your Care EveryWhere ID. This could be used by other organizations to access your [place] medical records  [id number]         Blood Pressure from Last 3 Encounters:   10/09/17 (!) 77/58   10/06/17 125/86   02/06/17 132/84    Weight from Last 3 Encounters:   10/08/17 87.6 kg (193 lb 3.2 oz)   02/06/17 91.6 kg (202 lb)   01/09/17 87.5 kg  (193 lb)              Today, you had the following     No orders found for display         Today's Medication Changes      Notice     This visit is during an admission. Changes to the med list made in this visit will be reflected in the After Visit Summary of the admission.             Primary Care Provider Office Phone # Fax #    Dipak Gordillo [phone] [address] [facility] [address]        Equal Access to Services     [facility] : Hadii aad ku hadasho Soomaali, waaxda luqadaha, qaybta kaalmada adeegyada, waxay idiin hayaan adeeg kharash la'aan . So [facility] [phone].    ATENCIÓN: Si habla español, tiene a mendiola disposición servicios gratuitos de asistencia lingüística. Llame al [phone].    We comply with applicable federal civil rights laws and [place] laws. We do not discriminate on the basis of race, color, national origin, age, disability, sex, sexual orientation, or gender identity.            Thank you!     Thank you for choosing [facility]  for your care. Our goal is always to provide you with excellent care. Hearing back from our patients is one way we can continue to improve our services. Please take a few minutes to complete the written survey that you may receive in the mail after your visit with us. Thank you!             Your Updated Medication List - Protect others around you: Learn how to safely use, store and throw away your medicines at www.disposemymeds.org.      Notice     This visit is during an admission. Changes to the med list made in this visit will be reflected in the After Visit Summary of the admission.

## 2017-10-09 NOTE — PLAN OF CARE
Problem: Patient Care Overview  Goal: Plan of Care/Patient Progress Review  Discharge Planner PT   Patient plan for discharge: further TBD, pt unable to state  Current status: PT 4A: PT eval, treatment performed. Pt provided UE and LE PROM to all planes, tolerated well. Unable to actively participate, currently intubated and sedated. VSS, Pt requiring VC-AC, FiO2 50%, PEEP 5, with intervention O2 sats 96%, HR 78 bpm.   Barriers to return to prior living situation: unable to actively participate in ROM, functional transfers, gait  Recommendations for discharge: recommend discharge to inpatient rehab setting, will further specify location as pt's needs are further clear  Rationale for recommendations: Far below baseline weakness, deficits in ROM, strength, activity tolerance and safety.        Entered by: Joycelyn Neri 10/09/2017 10:09 AM

## 2017-10-09 NOTE — PROGRESS NOTES
CLINICAL NUTRITION SERVICES - progress toward nutrition POC. See 10/7 note for full assessment.    -FEN/GI: On trophic TF of IsoSource 1.5 @ 10 mL/hr via OGT (however RD recommended Impact Peptide on 10/7). GRVs 0mL per RN flowsheet. Small-bore FT placed at bedside and not post-pyloric at this time.   -Labs:  (H) - appropriate for immune-modulating TF formula.     Interventions  -Collaboration with other providers - Discussed FT positioning w/ CVTS resident who wishes to keep TF trophic at this time vs adv toward goal, will recheck position in AM vs attempt reposition FT.   -Enteral Nutrition - Adjusted TF orders to Impact Peptide @ 10 mL/hr.    RD will continue to follow.    Maricel Childers RD, LD, [facility]  CVICU Dietitian  Pager: [phone]

## 2017-10-09 NOTE — PROGRESS NOTES
[facility] SERVICE: ONGOING CONSULTATION   Cricket Miguel : 1955 Sex: male:   Medical record number [id number] Attending Physician: Ramesh Kuo  Date of Service: 2017    PROBLEM LIST: (New and established)  1. 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 Left knee septic arthritis; recent steroid injection; cultures with MSSA  3. Imaging c/w right pyelonephritis  4. Multifocal acute infarctions involving the left parietotemporal lobes, left cerebellar hemisphere, and right occipital lobe  5. Ongoing critical illness with mechanical ventilation     RECOMMENDATIONS:   1. Continue Nafcillin 2g IV Q4 hours and Levofloxacin 750mg IV Q48 hours (better CNS penetration)  2. Continue Gentamicin IV daily; plan a total of 14 days (through 10/19/17)  3. Daily blood cultures; need to assess for clearance  4. Monitor renal function closely given use of vancomycin and gentamicin     DISCUSSION:   62 year old male with a history of severe severe aortic valve insufficiency and a 5 cm aortic root aneurysm and severe single vessel coronary artery disease involving an obtuse marginal branch, s/p Bentall procedure (aortic valve replacement and aortic root replacement with a composite graft constructed using a 27 mm St. Isidro Trifecta (bovine pericardial) valve and a 30 mm Mckenzie Cardioroot Valsalva tube graft), endoscopic vein harvest from the left lower extremity (reversed saphenous vein graft to OM) on 16, here for an evaluation of a fall and altered mental status, and evidence of sepsis.      Given the findings of a fluid collection around the aortic root and ascending thoracic aorta in the setting of a prosthetic heart valve, concern for infective endocarditis is high. Late onset prosthetic valve endocarditis typically involves Staphylococcus aureus, Staphylococcus epidermidis, Viridans streptococcus,  "and Enterococcal spp. Appropriate empiric treatment for prosthetic valve endocarditis involves vancomycin + gentamicin; rifampin may also be used when the organism is identified and proven susceptible and when the bacterial burden has been reduced (low threshold for resistance development with high bacterial load). Blood cultures are now growing MSSA; on nafcillin and gentamicin. Areas of concern for the source of IE include the left knee (especially given his recent steroid injection) and urinary/renal due to imaging suggestive of possible right pyelonephritis, although these could be secondary sites as well. 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 To improve CNS coverage for MSSA, added levofloxacin. Overall prognosis is guarded.   ID will continue to follow.     PINO Pereira M.D.  [facility] ID Service Staff  [phone]    CHIEF INFECTIOUS DISEASES COMPLAINT:  MSSA prosthetic valve endocarditis with embolic phenomena    INTERVAL HISTORY: (Extended HPI requires four HPI elements or the status of three chronic problems)  Fever spike this AM; WBC dropped to 3.9. Cerebral angiography today. Left knee tap grew MSSA as well. No follow up blood cultures to assess for clearance. Ongoing critical illness. On tube feeds.  ROS: (Recommend ? 2systems)   A five-point review of systems was not obtained due to intubation and unresponsiveness   Allergies   Allergen Reactions     Lisinopril Swelling     One-sided facial swelling after being on lisinopril/HCTZ for one week.      Allergies were reviewed.  No current outpatient prescriptions on file.       CURRENT ANTI-INFECTIVES:   Nafcillin (10/7-), Gentamicin (10/7-), Levofloxacin (10/7-)  EXAMINATION: (Recommend at least 12 bullets from any organ systems)   Vital Signs: BP (!) 77/58  Pulse 82  Temp 98.4  F (36.9  C) (Bladder)  Resp 15  Ht 1.727 m (5' 8\")  Wt 87.6 kg (193 lb 3.2 oz)  SpO2 97%  BMI 29.38 kg/m2   GENERAL:  well-developed, " well-nourished, in bed in no acute distress, unconscious, intubated   EYES:  Eyes have anicteric sclerae without conjunctival injection   ENT:  Atraumatic. oral intubation  NECK:  Supple. No cervical lymphadenopathy; right IJ C/D/I  LUNGS:  Clear to auscultation bilateral. Respiratory effort is normal  CARDIOVASCULAR:  Regular rate and rhythm with click noted  ABDOMEN:  Normal bowel sounds, soft, nontender. No appreciable hepatosplenomegaly  SKIN:  No acute rashes; lower extremities have a mottled appearance.  Line(s) are in place without any surrounding erythema or exudate. No stigmata of endocarditis.  NEUROLOGIC:  Unarousable; no movement; pinpoint pupils  PSYCH: Unable to assess  CURRENT LINES: Right IJ, PICC    NEW DATA/RESULTS:   All interval basic labs, microbiology results and imaging were personally reviewed.  Reviewed medicine test (PFTs, EKG, cardiac echo or cath): YES EKG    Culture Micro   Date Value Ref Range Status   10/08/2017 Culture negative monitoring continues  Preliminary   10/08/2017 Culture negative monitoring continues  Preliminary   10/08/2017 PENDING  Preliminary   10/06/2017 Light growth  Staphylococcus aureus   (A)  Final   10/06/2017   Final    Critical Value/Significant Value, preliminary result only, called to and read back by  Jodi Recio RN. 10.7.17 @ 1416.      10/06/2017 Culture negative monitoring continues  Preliminary       Recent Labs   Lab Test  10/09/17   0316  10/07/17   1333  10/06/17   1859  05/06/13   1703  02/02/11   1506   CRP  240.0*  290.0*  310.0*  <5.0  9.6*     Recent Labs   Lab Test  10/09/17   0316  10/08/17   0328  10/07/17   0422  10/06/17   1322  10/06/17   1020  06/03/16   1716   WBC  3.9*  11.8*  11.0  14.2*  17.1*  11.2*     Recent Labs   Lab Test  10/09/17   0316  10/08/17   0328  10/07/17   0422  10/06/17   1322   CR  1.46*  1.10  1.06  1.07   GFRESTIMATED  49*  67  70  69       Hematology Studies  Recent Labs   Lab Test  10/09/17   0316  10/08/17   0328   10/07/17   0422  10/06/17   1322  10/06/17   1020  06/03/16   1716   05/06/13   1703  12/04/12   1016  02/02/11   1506   11/06/09   1155  11/02/09   1717   WBC  3.9*  11.8*  11.0  14.2*  17.1*  11.2*   < >  10.6  11.9*  11.9*   < >  9.2  12.1*   ANEU   --    --    --    --   15.7*   --    --   7.2  7.9  8.2   --   5.9  8.3   AEOS   --    --    --    --   0.0   --    --   0.0  0.4  0.3   --   0.3  0.3   HCT  42.2  39.9*  38.8*  39.4*  43.8  37.1*   < >  46.2  47.4  46.8   < >  47.1  45.9   PLT  125*  138*  132*  150  185  353   < >  315  259  315   < >  303  312    < > = values in this interval not displayed.       Metabolic  Recent Labs   Lab Test  10/09/17   0316  10/08/17   0328  10/07/17   0422   NA  146*  141  138   BUN  21  14  18   CO2  26  26  24   CR  1.46*  1.10  1.06   GFRESTIMATED  49*  67  70       Hepatic Studies  Recent Labs   Lab Test  10/06/17   1020  06/03/16   1716  04/26/16   0654   BILITOTAL  1.5*  0.3  0.6   ALKPHOS  85  104  91   ALBUMIN  2.8*  3.4  3.1*   AST  24  19  20   ALT  33  32  26       Immunologlobulins  Recent Labs   Lab Test  05/06/13   1703  02/02/11   1506   SED  6  8

## 2017-10-09 NOTE — PROGRESS NOTES
CVTS Progress Note     Dissection of aorta, unspecified portion of aorta (H)  Sepsis due to other etiology (H)  NSTEMI (non-ST elevated myocardial infarction) (H)  Hypotension, unspecified hypotension type  S/P AVR (aortic valve replacement)    Subjective: no acute events overnight, moving all extremities but not following commands     Objective:  Temp:  [99  F (37.2  C)-103.3  F (39.6  C)] 99  F (37.2  C)  Heart Rate:  [] 74  Resp:  [15-17] 15  BP: (77)/(58) 77/58  MAP:  [55 mmHg-116 mmHg] 55 mmHg  Arterial Line BP: ()/(41-91) 74/46  FiO2 (%):  [40 %-60 %] 50 %  SpO2:  [91 %-99 %] 97 %    Ventilation Mode: CMV/AC  FiO2 (%): 50 %  Rate Set (breaths/minute): 14 breaths/min  Tidal Volume Set (mL): 500 mL  PEEP (cm H2O): 5 cmH2O  Oxygen Concentration (%): 50 %  Resp: 15    I/O last 3 completed shifts:  In: 3434.95 [I.V.:3134.95; NG/GT:180]  Out: 3065 [Urine:3055; Blood:10]    PE:  General: Intubated, not responsive   Neck: Supple, no tracheal deviation  Cardiovascular: RRR  Pumonary: Non labored breathing on MV  Abdomen: Soft, non distended, non tender  Ext: minimal edema, warm  Neuro: moving all extremities spontaneously    BMP  Recent Labs  Lab 10/09/17  0316 10/09/17  0032 10/08/17  0328 10/07/17  1333 10/07/17  0422  10/06/17  1322   *  --  141  --  138  --  138   POTASSIUM 3.4 3.5 3.4  --  3.9  < > 3.4   CHLORIDE 112*  --  106  --  106  --  105   CO2 26  --  26  --  24  --  24   BUN 21  --  14  --  18  --  18   CR 1.46*  --  1.10  --  1.06  --  1.07   *  --  123*  --  138*  --  162*   MAG 2.1 2.1 2.1 2.1 2.2  < > 1.5*   PHOS 2.5  --  2.4* 1.6* 2.4*  < > 2.7   < > = values in this interval not displayed.  CBC  Recent Labs  Lab 10/09/17  0316 10/08/17  0328 10/07/17  0422 10/06/17  1322   WBC 3.9* 11.8* 11.0 14.2*   HGB 13.7 13.3 13.1* 13.0*   * 138* 132* 150     INR/PTT  Recent Labs  Lab 10/08/17  0328 10/06/17  1322 10/06/17  1020   INR 1.03 1.25* 1.23*   PTT  --  32 32      ABG  Recent Labs  Lab 10/09/17  0551 10/09/17  0316 10/09/17  0032 10/08/17  1207   PH 7.42 7.47* 7.47* 7.46*   PCO2 41 38 38 38   PO2 113* 63* 90 88   HCO3 27 27 28 27     LFT  Recent Labs  Lab 10/06/17  1020   AST 24   ALT 33   ALKPHOS 85   BILITOTAL 1.5*   ALBUMIN 2.8*       A/P   62 year old with history of AVR and ascending aortic repair in May 2016 presented on 10/6 with acute mental status changes found to have large moises-aortic fluid collection and multiple strokes likely septic emboli.    Neuro: tylenol for fevers,continue neuro checks, IR angiogram today per neurology recommendations  Resp: inability to protect airway requiring mechanical ventilation, on minimal vent settings, could wean to extubate if mental status improves   CV: hypotensive, give fluid bolus this morning to assess for volume responsiveness and check lactate; monitor EKG and telemetry given AZ interval lengthening noted with moises-aortic abscess  GI/FEN: obtain post-pyloric feeding access and advance tube feeds to goal  Renal: low UOP and increased creatinine, fluid bolus, re-assess this afternoon  Endo: Insulin gtt, monitor with transition to tube feeds   ID: continue current antibiotic regimen pending ID recommendations- showing more signs of sepsis today with low WBC count and hypotension/JOSE, continue to follow cultures from blood and L knee  Heme: no bleeding concerns at this time, hold all anticoagulation until further neuro recommendations  Prophylaxis: H2 blocker  Lines: central line and arterial line: continue  Dispo: CV-ICU    Abdoulaye Andres MD  Surgery PGY-3

## 2017-10-09 NOTE — PROGRESS NOTES
CV ICU   Progress Note:    S/Interval History: Overnight no events.      O:  Temp: 99  F (37.2  C) Temp  Min: 99  F (37.2  C)  Max: 103.3  F (39.6  C)  Resp: 15 Resp  Min: 15  Max: 17  SpO2: 97 % SpO2  Min: 91 %  Max: 99 %    No Data Recorded  Heart Rate: 74 Heart Rate  Min: 74  Max: 112  BP: (!) 77/58   Systolic (24hrs), Av , Min:77 , Max:77   Diastolic (24hrs), Av, Min:58, Max:58    Ventilation Mode: CMV/AC  FiO2 (%): 50 %  Rate Set (breaths/minute): 14 breaths/min  Tidal Volume Set (mL): 500 mL  PEEP (cm H2O): 5 cmH2O  Oxygen Concentration (%): 50 %  Resp: 15     Date 10/09/17 0700 - 10/10/17 0659   Shift [id number] 5428-7341 6092-5473 24 Hour Total   I  N  T  A  K  E   I.V. 559.2   559.2    NG/GT 90   90    IV Piggyback 500   500    Enteral 50   50    Shift Total  (mL/kg) 1199.2  (13.68)   1199.2  (13.68)   O  U  T  P  U  T   Urine 70   70    Shift Total  (mL/kg) 70  (0.8)   70  (0.8)   Weight (kg) 87.63 87.63 87.63 87.63         Physical Exam    General: Intubated, mechanically ventilated, non responsive   Neck: Supple, no tracheal deviation  Cardiovascular: RRR  Pumonary: Non labored breathing on MV.  CTAB   Abdomen: Soft, non distended, non tender.   Ext: No peripheral edema, appropriate distal perfusion   Neuro: Non focal     Lab Results   Component Value Date    WBC 3.9 (L) 10/09/2017    HGB 13.7 10/09/2017    HCT 42.2 10/09/2017     (L) 10/09/2017     (H) 10/09/2017    POTASSIUM 3.4 10/09/2017    CHLORIDE 112 (H) 10/09/2017    CO2 26 10/09/2017    BUN 21 10/09/2017    CR 1.46 (H) 10/09/2017     (H) 10/09/2017    SED 6 2013    DD 1.7 (H) 2016    NTBNPI 2768 (H) 2016    NTBNP 1302 (H) 2016    TROPONIN <0.07 2005    TROPI 0.634 (HH) 10/08/2017    AST 24 10/06/2017    ALT 33 10/06/2017    ALKPHOS 85 10/06/2017    BILITOTAL 1.5 (H) 10/06/2017    INR 1.03 10/08/2017         Recent Labs  Lab 10/09/17  0551 10/09/17  0316 10/09/17  0032 10/08/17  1207   PH  7.42 7.47* 7.47* 7.46*   PCO2 41 38 38 38   PO2 113* 63* 90 88   HCO3 27 27 28 27   O2PER 60 50 50 40         A/P: 62 year old with history of AVR and ascending aortic repair in May 2016 presented with acute mental status changes found to have large moises-aortic fluid collection and multiple strokes likely septic emboli.    Neuro: tylenol for fevers, decrease sedation and Q1H neuro checks, appreciate neurology consult. Cerebral angiography today.  Minimal sedation for neuro examination.   Resp: minimal vent settings, Continue CMV at current settings   CV: Hold ASA per neuro. 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 Monitor daily EKG   GI/FEN: Post pyloric feeding tube placement, advance tube feeds per protocol.    Renal: good urine output, will give 500 of LR due to hypotension  Endo: Insulin gtt   ID: vanco and gentamicin for positive blood cultures, ID following, ortho tapped left knee effusion with ++WBC, follow up recommendations.   Heme: no bleeding concerns at this time  Prophylaxis: H2 blocker, hold heparin per neuro recs  Lines: central line and arterial line from yesterday, continue  Dispo: CV-ICU     Patient seen with Dr. Duncan, staff Intensivist      Sukhwinder Mejía MD  Anesthesiology Resident   [phone]

## 2017-10-09 NOTE — PROGRESS NOTES
Neuroscience Intensive Care Progress Note    10/09/2017    Cricket Miguel is a 62 year old year old male admitted on 10/6/2017 with sepsis - prosthetic cortic valve endocarditis and fluid collection noted in the aortic root and ascending thoracic aorta found to have multiple embolic infarcts - including large cerebellar evolving infarct.         24 Hour Vital Signs Summary:  Temperatures:  Current - Temp: 99.7  F (37.6  C); Max - Temp  Av.2  F (38.4  C)  Min: 98.4  F (36.9  C)  Max: 103.3  F (39.6  C)  Respiration range: Resp  Av  Min: 15  Max: 17  Pulse range: No Data Recorded  Blood pressure range: Systolic (24hrs), Av , Min:77 , Max:77   ; Diastolic (24hrs), Av, Min:58, Max:58    Pulse oximetry range: SpO2  Av.7 %  Min: 91 %  Max: 99 %    Ventilator Settings  Ventilation Mode: CMV/AC  FiO2 (%): 40 %  Rate Set (breaths/minute): 14 breaths/min  Tidal Volume Set (mL): 500 mL  PEEP (cm H2O): 5 cmH2O  Oxygen Concentration (%): 40 %  Resp: 15      Intake/Output Summary (Last 24 hours) at 10/09/17 1728  Last data filed at 10/09/17 1700   Gross per 24 hour   Intake          3339.71 ml   Output             1615 ml   Net          1724.71 ml       Arterial Line BP: ()/(41-91) 96/48  MAP:  [55 mmHg-116 mmHg] 64 mmHg  BP - Mean:  [62] 62    Current Medications:    polyethylene glycol  17 g Oral Daily     lactated ringers         [START ON 10/10/2017] levofloxacin  750 mg Intravenous Q48H     [START ON 10/10/2017] famotidine  20 mg Intravenous Q24H     [START ON 10/15/2017] influenza quadrivalent (PF) vacc age 3 yrs and older  0.5 mL Intramuscular Prior to discharge     [START ON 10/15/2017] pneumococcal vaccine  0.5 mL Intramuscular Prior to discharge     sodium chloride (PF)  3 mL Intravenous Q8H     gentamicin  3 mg/kg (Adjusted) Intravenous Q24H     multivitamins with minerals  15 mL Per Feeding Tube Daily     nafcillin  2 g Intravenous Q4H     senna-docusate  1-2 tablet Oral or Feeding Tube  "BID     sodium chloride (PF)  3 mL Intracatheter Q8H     mupirocin  0.5 g Both Nostrils BID       PRN Medications:  sodium chloride (PF), - MEDICATION INSTRUCTIONS -, HOLD MEDICATION, HOLD MEDICATION, HOLD MEDICATION, - MEDICATION INSTRUCTIONS -, IV fluid REPLACEMENT ONLY, acetaminophen, prochlorperazine **OR** prochlorperazine, naloxone, IV fluid REPLACEMENT ONLY, glucose **OR** dextrose **OR** glucagon, lidocaine (buffered or not buffered), lidocaine 4%, sodium chloride (PF), Reason beta blocker order not selected, albumin human, hydrALAZINE, insulin aspart, meperidine, albuterol, albuterol, fentaNYL, potassium chloride, potassium chloride, potassium chloride, potassium chloride with lidocaine, potassium chloride, magnesium sulfate, magnesium sulfate, potassium phosphate (KPHOS) in D5W IV, potassium phosphate (KPHOS) in D5W IV, potassium phosphate (KPHOS) in D5W IV, potassium phosphate (KPHOS) in D5W IV, potassium phosphate (KPHOS) in D5W IV, diphenhydrAMINE **OR** diphenhydrAMINE, ondansetron **OR** ondansetron    Infusions:    fentaNYL 150 mcg/hr (10/09/17 1630)     - MEDICATION INSTRUCTIONS -       - MEDICATION INSTRUCTIONS -       IV fluid REPLACEMENT ONLY       propofol (DIPRIVAN) infusion 10 mcg/kg/min (10/09/17 1600)     IV fluid REPLACEMENT ONLY       insulin (regular) Stopped (10/08/17 1821)     Reason beta blocker order not selected       dextrose 5% and 0.45% NaCl + KCl 20 mEq/L 50 mL/hr at 10/09/17 1600       Allergies   Allergen Reactions     Lisinopril Swelling     One-sided facial swelling after being on lisinopril/HCTZ for one week.        Physical Examination:  BP (!) 77/58  Pulse 82  Temp 99.7  F (37.6  C) (Bladder)  Resp 15  Ht 1.727 m (5' 8\")  Wt 87.6 kg (193 lb 3.2 oz)  SpO2 96%  BMI 29.38 kg/m2  Lethargic - responds to painful stimuli. No gaze abnormality noted. PERRLA. Occulo cephalic intact. Moves left UE and LE with minimally painful stimuli. No movement on RUE noted. Minimal " withdrawal on RLE.       Labs/Studies:  Recent Labs   Lab Test  10/09/17   1529  10/09/17   0316  10/09/17   0032  10/08/17   0328  10/07/17   0422   NA  146*  146*   --   141  138   POTASSIUM  4.3  3.4  3.5  3.4  3.9   CHLORIDE  113*  112*   --   106  106   CO2  25  26   --   26  24   ANIONGAP  9  9   --   10  8   GLC  154*  140*   --   123*  138*   BUN  32*  21   --   14  18   CR  2.40*  1.46*   --   1.10  1.06   IZABELLA  8.6  8.2*   --   8.5  7.9*   WBC   --   3.9*   --   11.8*  11.0   RBC   --   4.58   --   4.45  4.33*   HGB   --   13.7   --   13.3  13.1*   PLT   --   125*   --   138*  132*       Recent Labs   Lab Test  10/08/17   0328  10/06/17   1322  10/06/17   1020  05/18/16   0435   INR  1.03  1.25*  1.23*  1.28*   PTT   --   32  32  35           Recent Labs  Lab 10/09/17  1056 10/09/17  0551 10/09/17  0316 10/09/17  0032   PH 7.39 7.42 7.47* 7.47*   PCO2 44 41 38 38   PO2 139* 113* 63* 90   HCO3 26 27 27 28   O2PER 50.0 60 50 50           Imaging:  Reviewed     Assessment/Plan      62 year old with sepsis - prosthetic cortic valve endocarditis and fluid collection noted in the aortic root and ascending thoracic aorta found to have multiple embolic infarcts - including large cerebellar evolving infarct.     - Repeat CT for evaluation of edema related to right PICA infarct.  - ASA 81 mg   - Continue to hold anticoagulation for now  - minimal sedation to assess neurologic exam  - Cerebral Angiogram planned on Wednesday    Plan discussed with Dr. Miller.    Siddhartha Dyson  Fellow

## 2017-10-09 NOTE — PROCEDURES
Small Bowel Feeding Tube Placement Assessment  Reason for Feeding Tube Placement: request for post-pyloric FT by providers  Cortrak Start Time: 10:55 am   Cortrak End Time: 11:47 am  Medicine Delivered During Procedure: lidocaine gel  Placement Successful: Presume gastric at pylorus (pending AXR confirmation).  Will delay AXR in hopes of FT migration  Procedure Complications: Pt agitated throughout procedure. FT continually coiled back into stomach when approached pylorus.   Final Placement Edmar at exit of nare 99 cm  Face to Face time with patient: 60 mins    Maricel Childers RD, LD, CNSC  CVICU Dietitian  Pager: [phone]

## 2017-10-09 NOTE — PROCEDURES
Bridle Placement:   Reason for bridle placement: pt agitiation   Medicine delivered during procedure: lubricating jelly    Procedure: Successful   Location of top of clip on FT: @ 100 cm marker   Condition of nose/skin at time of bridle placement: Unremarkable   Face to Face time with patient: <5 minutes.    Maricel Childers RD, LD, [facility]  CVICU Dietitian  Pager: [phone]

## 2017-10-09 NOTE — CONSULTS
"[facility]  NEUROSURGERY CONSULTATION NOTE    This consultation was requested by the stroke service.    HPI:  Mr. Miguel is a 62 year old man admitted to the [place] for sepsis concern, with a hx of composite aortic valve and root replacement, who recently had a left knee arthroscopy for concern of infection, and also has evidence of L cerebellar, L MCA and R occipital stroke possible cardioembolic.     Per family, patient had been feeling sick with \"flu-like symptoms\" on evening of 10/5/17. Around 8:30AM on 10/6/17, patients wife talked to him in bed and thought he seemed fine, except for the flu-like symptoms. Around 9:30 AM on 10/6 wife heard a \"thud\" and saw that patient fell out of bed. She called 911 & patient was taken to [facility] where he was intubated for airway protection.    PAST MEDICAL HISTORY:   Past Medical History:   Diagnosis Date     AI (aortic insufficiency)      Aortic root dilatation (H)      AR (aortic regurgitation)     severe     Cardiomyopathy (H)      CHF (congestive heart failure), NYHA class II (H)      COPD, mild (H)     spirometry 12/16     Heart murmur      Hyperlipidemia LDL goal <70 10/31/2010     Hypertension goal BP (blood pressure) < 140/90      Inguinal hernia      left lung nodule  1/29/2008     Major depressive disorder, single episode, moderate (H)      Psoriasis childhood     Tobacco use disorder        PAST SURGICAL HISTORY:   Past Surgical History:   Procedure Laterality Date     HC SHOULDER ARTHROSCOPY, DX  2001    Arthroscopy, Shoulder RT Dr OSIRIS Andersen     HC SHOULDER ARTHROSCOPY, DX  1/04    LT arthroscopy Dr OSIRIS Andersen     HERNIA REPAIR, UMBILICAL  1/08    incarcerated - dr rockwell     HERNIORRHAPHY INGUINAL  12/21/2012    HERNIORRHAPHY INGUINAL;  Right Inguinal Hernia Repair with mesh ;  Surgeon: Mello Rockwell MD;  Location: [facility] OR     REPLACE VALVE AORTIC N/A 5/17/2016    REPLACE VALVE AORTIC - Dr Bowers " "    ROTATOR CUFF REPAIR RT/LT  11/10    Rt arthroscopy - Dr OSIRIS Andersen     SURGICAL HISTORY OF -   5/16    AVR, severe AR, aortic root repair       FAMILY HISTORY:   Family History   Problem Relation Age of Onset     Genetic Disorder Mother      Bone plateover growth Agustin. shoulder surgeries     CANCER Mother      brain cancer     Alzheimer Disease Father        SOCIAL HISTORY:   Social History   Substance Use Topics     Smoking status: Former Smoker     Packs/day: 1.00     Years: 35.00     Quit date: 4/1/2016     Smokeless tobacco: Never Used      Comment: 4/2016     Alcohol use 0.0 oz/week     0 Standard drinks or equivalent per week      Comment: 1 drink per week avg, seldom       MEDICATIONS:  No current outpatient prescriptions on file.       Allergies:  Allergies   Allergen Reactions     Lisinopril Swelling     One-sided facial swelling after being on lisinopril/HCTZ for one week.          ROS: 10 point ROS of systems including Constitutional, Eyes, Respiratory, Cardiovascular, Gastroenterology, Genitourinary, Integumentary, Muscularskeletal, Psychiatric were all negative except for pertinent positives noted in my HPI.    EXAM:  /78  Pulse 82  Temp 99.9  F (37.7  C) (Bladder)  Resp 15  Ht 1.727 m (5' 8\")  Wt 87.6 kg (193 lb 3.2 oz)  SpO2 96%  BMI 29.38 kg/m2  CV: RRR, no m/r/g  Resp: CTAB, no wheezes or rubs  Abd: soft, NT, ND. BS+ throughout.  Neuro:   Intubated, sedated.   PERRL, tracks in all direction. Face symmetric. Gag intact. Corneal intact.   Withdraw x RUE and RLE. Moves LUE and LLE spontaneously but not to command.     LABS/IMAGING:  Lab Results   Component Value Date    WBC 3.9 10/09/2017     Lab Results   Component Value Date    RBC 4.58 10/09/2017     Lab Results   Component Value Date    HGB 13.7 10/09/2017     Lab Results   Component Value Date    HCT 42.2 10/09/2017     Lab Results   Component Value Date    MCV 92 10/09/2017     Lab Results   Component Value Date    MCH 29.9 " 10/09/2017     Lab Results   Component Value Date    MCHC 32.5 10/09/2017     Lab Results   Component Value Date    RDW 15.4 10/09/2017     Lab Results   Component Value Date     10/09/2017     Last Basic Metabolic Panel:  Lab Results   Component Value Date     10/09/2017      Lab Results   Component Value Date    POTASSIUM 3.4 10/09/2017     Lab Results   Component Value Date    CHLORIDE 112 10/09/2017     Lab Results   Component Value Date    IZABELLA 8.2 10/09/2017     Lab Results   Component Value Date    CO2 26 10/09/2017     Lab Results   Component Value Date    BUN 21 10/09/2017     Lab Results   Component Value Date    CR 1.46 10/09/2017     Lab Results   Component Value Date     10/09/2017         CT Head w/o Contrast   Final Result   Impression:    1. No significant change since 10/7/2017. Stable late acute infarcts   involving multiple vascular territories. Stable localized mass effect.   No hemorrhagic transformation, midline shift, herniation or   hydrocephalus.   2. Stable chronic left posterior cerebral artery and posterior left   cerebral hemispheric border zone territory infarct.   3. Stable moderate chronic small vessel ischemic disease.      YESI GOMEZ MD      CT Head w/o Contrast   Final Result   Impression:    1. Expected interval evolution of supratentorial and infratentorial   infarcts.   2. No acute intracranial hemorrhage.   3. Mass effect with partial effacement of the fourth ventricle. No   hydrocephalus.      I have personally reviewed the examination and initial interpretation   and I agree with the findings.      NELSY LICONA MD      XR Abdomen Port 1 View   Final Result   Impression: OG tube tip and sidehole project over the stomach.      KENNY CLARA      MR Brain for Stroke Cmpl w/o & w Contras   Final Result   Abnormal   Impression:   1. Bilateral regions of acute infarction involving multiple vascular   territories, suggestive of embolic phenomenon.   2.  Chronic left posterior cerebral artery territory and posterior left   hemispheric border zone infarcts.   3. Moderate chronic small vessel ischemic disease.   4. Widely patent major intracranial and cervical arteries. No stenosis   or aneurysm.      [Urgent Result: Acute infarction]      Finding was identified on 10/6/2017 11:15 PM.       Dr. Pinedo was contacted by Dr. Ramesh Aguila at 10/6/2017 11:15   PM and verbalized understanding of the urgent finding.       I have personally reviewed the examination and initial interpretation   and I agree with the findings.      YESI GOMEZ MD      XR Chest Port 1 View   Final Result   Impression:     1. Slightly increased small left pleural effusion and adjacent basilar   atelectasis and/or consolidation.   2. Stable cardiomegaly and mild pulmonary vascular congestion.   3. Stable support devices.       I have personally reviewed the examination and initial interpretation   and I agree with the findings.      KENNY ELIZABETH      XR Knee Port Left 1/2 Views   Final Result   IMPRESSION:    1. No acute osseous adenopathy.   2. Mild degenerative changes of the left knee, most pronounced in the   medial compartment.      I have personally reviewed the examination and initial interpretation   and I agree with the findings.      BONNIE ROWLAND MD      XR Chest Port 1 View   Final Result   IMPRESSION:    1. Cardiomegaly with pulmonary vascular congestion.   2. Unchanged widening of the mediastinum, which corresponds with the   mediastinal hematoma or periaortic abscess seen on CT 10/6/2017.      I have personally reviewed the examination and initial interpretation   and I agree with the findings.      SAMANTHA CHRISTOPHER MD          ASSESSMENT:  62 year old with L cerebellar, L MCA and R occipital hypointensity consistent with embolic strokes.     RECOMMENDATIONS:  - Neuro checks q 2 at least  - Normonatremia, normothermia  - SBP < 140  - Daily CT head scans  - To angio  today. Will follow up results.   - No chemical dvt ppx  - SCDs only     The patient was discussed with Dr. Leggett, neurosurgery chief resident, and he agrees with the above.    Melanie Ramos MD  Neurosurgery, PGY-2    I have reviewed the history.I have seen and examined the patient myself and agree with the assessment and plan above.  PINO Miller MD

## 2017-10-09 NOTE — PLAN OF CARE
Problem: Restraint for Non-Violent/Non-Self-Destructive Behavior  Goal: Prevent/Manage Potential Problems  Maintain safety of patient and others during period of restraint.  Promote psychological and physical wellbeing.  Prevent injury to skin and involved body parts.  Promote nutrition, hydration, and elimination.   OT: 4AB: OT holding, PT to follow for ROM and OT initiate as appropriate per PT reccomendations

## 2017-10-10 ENCOUNTER — APPOINTMENT (OUTPATIENT)
Dept: GENERAL RADIOLOGY | Facility: CLINIC | Age: 62
DRG: 004 | End: 2017-10-10
Attending: PSYCHIATRY & NEUROLOGY
Payer: COMMERCIAL

## 2017-10-10 ENCOUNTER — APPOINTMENT (OUTPATIENT)
Dept: CT IMAGING | Facility: CLINIC | Age: 62
DRG: 004 | End: 2017-10-10
Attending: STUDENT IN AN ORGANIZED HEALTH CARE EDUCATION/TRAINING PROGRAM
Payer: COMMERCIAL

## 2017-10-10 ENCOUNTER — ALLIED HEALTH/NURSE VISIT (OUTPATIENT)
Dept: NEUROLOGY | Facility: CLINIC | Age: 62
DRG: 004 | End: 2017-10-10
Attending: PSYCHIATRY & NEUROLOGY
Payer: COMMERCIAL

## 2017-10-10 DIAGNOSIS — G93.40 ENCEPHALOPATHY: Primary | ICD-10-CM

## 2017-10-10 LAB
ANION GAP SERPL CALCULATED.3IONS-SCNC: 8 MMOL/L (ref 3–14)
BASE EXCESS BLDA CALC-SCNC: 0 MMOL/L
BUN SERPL-MCNC: 44 MG/DL (ref 7–30)
CALCIUM SERPL-MCNC: 7.5 MG/DL (ref 8.5–10.1)
CHLORIDE SERPL-SCNC: 118 MMOL/L (ref 94–109)
CO2 SERPL-SCNC: 25 MMOL/L (ref 20–32)
CREAT SERPL-MCNC: 3.06 MG/DL (ref 0.66–1.25)
ERYTHROCYTE [DISTWIDTH] IN BLOOD BY AUTOMATED COUNT: 16.5 % (ref 10–15)
GENTAMICIN SERPL-MCNC: 2.8 MG/L
GFR SERPL CREATININE-BSD FRML MDRD: 21 ML/MIN/1.7M2
GLUCOSE SERPL-MCNC: 162 MG/DL (ref 70–99)
HCO3 BLD-SCNC: 26 MMOL/L (ref 21–28)
HCT VFR BLD AUTO: 33.9 % (ref 40–53)
HGB BLD-MCNC: 11 G/DL (ref 13.3–17.7)
MAGNESIUM SERPL-MCNC: 2.5 MG/DL (ref 1.6–2.3)
MAGNESIUM SERPL-MCNC: 2.6 MG/DL (ref 1.6–2.3)
MCH RBC QN AUTO: 29.6 PG (ref 26.5–33)
MCHC RBC AUTO-ENTMCNC: 32.4 G/DL (ref 31.5–36.5)
MCV RBC AUTO: 91 FL (ref 78–100)
O2/TOTAL GAS SETTING VFR VENT: 40 %
OSMOLALITY SERPL: 322 MMOL/KG (ref 280–301)
OSMOLALITY SERPL: 337 MMOL/KG (ref 280–301)
OSMOLALITY SERPL: 347 MMOL/KG (ref 280–301)
OSMOLALITY SERPL: 359 MMOL/KG (ref 280–301)
PCO2 BLD: 46 MM HG (ref 35–45)
PH BLD: 7.36 PH (ref 7.35–7.45)
PHOSPHATE SERPL-MCNC: 4.8 MG/DL (ref 2.5–4.5)
PLATELET # BLD AUTO: 155 10E9/L (ref 150–450)
PO2 BLD: 94 MM HG (ref 80–105)
POTASSIUM SERPL-SCNC: 4.4 MMOL/L (ref 3.4–5.3)
RBC # BLD AUTO: 3.71 10E12/L (ref 4.4–5.9)
SODIUM SERPL-SCNC: 151 MMOL/L (ref 133–144)
SODIUM SERPL-SCNC: 155 MMOL/L (ref 133–144)
SODIUM SERPL-SCNC: 159 MMOL/L (ref 133–144)
SODIUM SERPL-SCNC: 160 MMOL/L (ref 133–144)
WBC # BLD AUTO: 10.9 10E9/L (ref 4–11)

## 2017-10-10 PROCEDURE — 25000125 ZZHC RX 250: Performed by: SURGERY

## 2017-10-10 PROCEDURE — 25000128 H RX IP 250 OP 636: Performed by: SURGERY

## 2017-10-10 PROCEDURE — 80048 BASIC METABOLIC PNL TOTAL CA: CPT | Performed by: ORTHOPAEDIC SURGERY

## 2017-10-10 PROCEDURE — S0028 INJECTION, FAMOTIDINE, 20 MG: HCPCS | Performed by: THORACIC SURGERY (CARDIOTHORACIC VASCULAR SURGERY)

## 2017-10-10 PROCEDURE — 85027 COMPLETE CBC AUTOMATED: CPT | Performed by: ORTHOPAEDIC SURGERY

## 2017-10-10 PROCEDURE — 83930 ASSAY OF BLOOD OSMOLALITY: CPT | Performed by: ORTHOPAEDIC SURGERY

## 2017-10-10 PROCEDURE — 93010 ELECTROCARDIOGRAM REPORT: CPT | Performed by: INTERNAL MEDICINE

## 2017-10-10 PROCEDURE — 25000125 ZZHC RX 250: Performed by: STUDENT IN AN ORGANIZED HEALTH CARE EDUCATION/TRAINING PROGRAM

## 2017-10-10 PROCEDURE — 83735 ASSAY OF MAGNESIUM: CPT | Performed by: ORTHOPAEDIC SURGERY

## 2017-10-10 PROCEDURE — 83735 ASSAY OF MAGNESIUM: CPT | Performed by: INTERNAL MEDICINE

## 2017-10-10 PROCEDURE — 25000128 H RX IP 250 OP 636: Performed by: THORACIC SURGERY (CARDIOTHORACIC VASCULAR SURGERY)

## 2017-10-10 PROCEDURE — 87800 DETECT AGNT MULT DNA DIREC: CPT | Performed by: SURGERY

## 2017-10-10 PROCEDURE — 27210437 ZZH NUTRITION PRODUCT SEMIELEM INTERMED LITER

## 2017-10-10 PROCEDURE — 99291 CRITICAL CARE FIRST HOUR: CPT | Mod: GC | Performed by: INTERNAL MEDICINE

## 2017-10-10 PROCEDURE — 95951 ZZHC EEG VIDEO < 12 HR: CPT | Mod: 52,ZF

## 2017-10-10 PROCEDURE — 25000132 ZZH RX MED GY IP 250 OP 250 PS 637: Performed by: SURGERY

## 2017-10-10 PROCEDURE — 94003 VENT MGMT INPAT SUBQ DAY: CPT

## 2017-10-10 PROCEDURE — 20000004 ZZH R&B ICU UMMC

## 2017-10-10 PROCEDURE — 82803 BLOOD GASES ANY COMBINATION: CPT | Performed by: ORTHOPAEDIC SURGERY

## 2017-10-10 PROCEDURE — 70450 CT HEAD/BRAIN W/O DYE: CPT

## 2017-10-10 PROCEDURE — 40000014 ZZH STATISTIC ARTERIAL MONITORING DAILY

## 2017-10-10 PROCEDURE — 25000128 H RX IP 250 OP 636: Performed by: STUDENT IN AN ORGANIZED HEALTH CARE EDUCATION/TRAINING PROGRAM

## 2017-10-10 PROCEDURE — 87077 CULTURE AEROBIC IDENTIFY: CPT | Performed by: SURGERY

## 2017-10-10 PROCEDURE — 84295 ASSAY OF SERUM SODIUM: CPT | Performed by: STUDENT IN AN ORGANIZED HEALTH CARE EDUCATION/TRAINING PROGRAM

## 2017-10-10 PROCEDURE — 80170 ASSAY OF GENTAMICIN: CPT | Performed by: ORTHOPAEDIC SURGERY

## 2017-10-10 PROCEDURE — 83930 ASSAY OF BLOOD OSMOLALITY: CPT | Performed by: STUDENT IN AN ORGANIZED HEALTH CARE EDUCATION/TRAINING PROGRAM

## 2017-10-10 PROCEDURE — 25000128 H RX IP 250 OP 636

## 2017-10-10 PROCEDURE — 25000132 ZZH RX MED GY IP 250 OP 250 PS 637: Performed by: STUDENT IN AN ORGANIZED HEALTH CARE EDUCATION/TRAINING PROGRAM

## 2017-10-10 PROCEDURE — 36415 COLL VENOUS BLD VENIPUNCTURE: CPT | Performed by: SURGERY

## 2017-10-10 PROCEDURE — 25000125 ZZHC RX 250: Performed by: THORACIC SURGERY (CARDIOTHORACIC VASCULAR SURGERY)

## 2017-10-10 PROCEDURE — 87186 SC STD MICRODIL/AGAR DIL: CPT | Performed by: SURGERY

## 2017-10-10 PROCEDURE — 25000128 H RX IP 250 OP 636: Performed by: PHARMACIST

## 2017-10-10 PROCEDURE — 74000 XR ABDOMEN PORT F1 VW: CPT

## 2017-10-10 PROCEDURE — 40000275 ZZH STATISTIC RCP TIME EA 10 MIN

## 2017-10-10 PROCEDURE — 84100 ASSAY OF PHOSPHORUS: CPT | Performed by: ORTHOPAEDIC SURGERY

## 2017-10-10 PROCEDURE — 93005 ELECTROCARDIOGRAM TRACING: CPT

## 2017-10-10 PROCEDURE — 25000132 ZZH RX MED GY IP 250 OP 250 PS 637: Performed by: THORACIC SURGERY (CARDIOTHORACIC VASCULAR SURGERY)

## 2017-10-10 PROCEDURE — S0032 INJECTION, NAFCILLIN SODIUM: HCPCS | Performed by: SURGERY

## 2017-10-10 PROCEDURE — 87040 BLOOD CULTURE FOR BACTERIA: CPT | Performed by: SURGERY

## 2017-10-10 RX ORDER — HYDRALAZINE HYDROCHLORIDE 20 MG/ML
INJECTION INTRAMUSCULAR; INTRAVENOUS
Status: COMPLETED
Start: 2017-10-10 | End: 2017-10-10

## 2017-10-10 RX ORDER — LIDOCAINE 40 MG/G
CREAM TOPICAL
Status: DISCONTINUED | OUTPATIENT
Start: 2017-10-10 | End: 2017-10-11 | Stop reason: HOSPADM

## 2017-10-10 RX ORDER — SODIUM CHLORIDE 9 MG/ML
INJECTION, SOLUTION INTRAVENOUS CONTINUOUS
Status: DISCONTINUED | OUTPATIENT
Start: 2017-10-10 | End: 2017-10-11 | Stop reason: HOSPADM

## 2017-10-10 RX ORDER — HYDRALAZINE HYDROCHLORIDE 20 MG/ML
10-20 INJECTION INTRAMUSCULAR; INTRAVENOUS EVERY 30 MIN PRN
Status: DISCONTINUED | OUTPATIENT
Start: 2017-10-10 | End: 2017-10-29

## 2017-10-10 RX ADMIN — FENTANYL CITRATE 200 MCG/HR: 50 INJECTION, SOLUTION INTRAMUSCULAR; INTRAVENOUS at 09:57

## 2017-10-10 RX ADMIN — ACETAMINOPHEN 650 MG: 325 SOLUTION ORAL at 00:29

## 2017-10-10 RX ADMIN — ACETAMINOPHEN 650 MG: 325 SOLUTION ORAL at 07:52

## 2017-10-10 RX ADMIN — POLYETHYLENE GLYCOL 3350 17 G: 17 POWDER, FOR SOLUTION ORAL at 07:52

## 2017-10-10 RX ADMIN — NAFCILLIN SODIUM 2 G: 2 INJECTION, POWDER, LYOPHILIZED, FOR SOLUTION INTRAMUSCULAR; INTRAVENOUS at 05:40

## 2017-10-10 RX ADMIN — FENTANYL CITRATE 200 MCG/HR: 50 INJECTION, SOLUTION INTRAMUSCULAR; INTRAVENOUS at 17:01

## 2017-10-10 RX ADMIN — NAFCILLIN SODIUM 2 G: 2 INJECTION, POWDER, LYOPHILIZED, FOR SOLUTION INTRAMUSCULAR; INTRAVENOUS at 22:04

## 2017-10-10 RX ADMIN — PROPOFOL 10 MCG/KG/MIN: 10 INJECTION, EMULSION INTRAVENOUS at 03:37

## 2017-10-10 RX ADMIN — NAFCILLIN SODIUM 2 G: 2 INJECTION, POWDER, LYOPHILIZED, FOR SOLUTION INTRAMUSCULAR; INTRAVENOUS at 14:11

## 2017-10-10 RX ADMIN — ACETAMINOPHEN 650 MG: 325 SOLUTION ORAL at 15:38

## 2017-10-10 RX ADMIN — FENTANYL CITRATE 200 MCG/HR: 50 INJECTION, SOLUTION INTRAMUSCULAR; INTRAVENOUS at 03:10

## 2017-10-10 RX ADMIN — ACETAMINOPHEN 650 MG: 325 SOLUTION ORAL at 11:51

## 2017-10-10 RX ADMIN — ACETAMINOPHEN 650 MG: 325 SOLUTION ORAL at 23:13

## 2017-10-10 RX ADMIN — NAFCILLIN SODIUM 2 G: 2 INJECTION, POWDER, LYOPHILIZED, FOR SOLUTION INTRAMUSCULAR; INTRAVENOUS at 18:19

## 2017-10-10 RX ADMIN — HYDRALAZINE HYDROCHLORIDE 20 MG: 20 INJECTION INTRAMUSCULAR; INTRAVENOUS at 23:14

## 2017-10-10 RX ADMIN — Medication 80 MG: at 07:53

## 2017-10-10 RX ADMIN — GENTAMICIN SULFATE 100 MG: 100 INJECTION, SOLUTION INTRAVENOUS at 10:06

## 2017-10-10 RX ADMIN — HYDRALAZINE HYDROCHLORIDE 10 MG: 20 INJECTION INTRAMUSCULAR; INTRAVENOUS at 11:51

## 2017-10-10 RX ADMIN — FENTANYL CITRATE 200 MCG/HR: 50 INJECTION, SOLUTION INTRAMUSCULAR; INTRAVENOUS at 23:50

## 2017-10-10 RX ADMIN — SODIUM CHLORIDE: 3 INJECTION, SOLUTION INTRAVENOUS at 03:37

## 2017-10-10 RX ADMIN — SENNOSIDES AND DOCUSATE SODIUM 2 TABLET: 8.6; 5 TABLET ORAL at 07:52

## 2017-10-10 RX ADMIN — HYDRALAZINE HYDROCHLORIDE 10 MG: 20 INJECTION INTRAMUSCULAR; INTRAVENOUS at 22:32

## 2017-10-10 RX ADMIN — NAFCILLIN SODIUM 2 G: 2 INJECTION, POWDER, LYOPHILIZED, FOR SOLUTION INTRAMUSCULAR; INTRAVENOUS at 09:22

## 2017-10-10 RX ADMIN — SENNOSIDES AND DOCUSATE SODIUM 1 TABLET: 8.6; 5 TABLET ORAL at 20:27

## 2017-10-10 RX ADMIN — SODIUM CHLORIDE: 3 INJECTION, SOLUTION INTRAVENOUS at 10:42

## 2017-10-10 RX ADMIN — LEVOFLOXACIN 750 MG: 5 INJECTION, SOLUTION INTRAVENOUS at 10:06

## 2017-10-10 RX ADMIN — PROPOFOL 10 MCG/KG/MIN: 10 INJECTION, EMULSION INTRAVENOUS at 15:38

## 2017-10-10 RX ADMIN — SODIUM CHLORIDE: 3 INJECTION, SOLUTION INTRAVENOUS at 16:44

## 2017-10-10 RX ADMIN — FAMOTIDINE 20 MG: 10 INJECTION, SOLUTION INTRAVENOUS at 10:06

## 2017-10-10 RX ADMIN — NAFCILLIN SODIUM 2 G: 2 INJECTION, POWDER, LYOPHILIZED, FOR SOLUTION INTRAMUSCULAR; INTRAVENOUS at 02:19

## 2017-10-10 RX ADMIN — MULTIVITAMIN 15 ML: LIQUID ORAL at 07:53

## 2017-10-10 ASSESSMENT — VISUAL ACUITY
OU: OTHER (SEE COMMENT)

## 2017-10-10 NOTE — PROCEDURES
[facility] EEG #-2 (Day 2 of Video-EEG Monitoring)    DATE OF RECORDING:  10/08/2017    DURATION OF RECORDIN hours, 40 minutes.      CLINICAL SUMMARY:  This diagnostic video-EEG monitoring procedure was performed in evaluation of seizures in Ashtyn Miguel who reportedly sustained bihemispheric cerebral infarctions in the setting of valvular heart disease.  He was reported to be sedated with propofol and fentanyl during mechanical ventilation at the time of this recording.      TECHNICAL SUMMARY:  This continuous EEG monitoring procedure was performed with 23 scalp electrodes in 10-20 system placements, and additional scalp, precordial and other surface electrodes used for electrical referencing and artifact detection.  Video monitoring was utilized and periodically reviewed by EEG technologists and the physician for electroclinical correlation.     EEG ACTIVITIES DURING SEDATED COMA:  During this recording, there were no state-specific activities or evidence of wake-sleep cycling.  There was ongoing mixed generalized 1-8 Hz delta-theta slowing, with additional independent left hemispheric polymorphic 1-4 Hz delta slowing.  There were occasional left frontocentral sharp waves.    No electrographic seizures were recorded.      SUMMARY OF DAY 2 OF VIDEO-EEG MONITORING:    he interictal EEG recording during sedated coma was  abnormal due to continuous generalized delta-theta slowing, with additional focal delta slowing over the left hemisphere, and occasional left frontocentral sharp waves.  No electrographic seizures were recorded.    These abnormalities indicate moderate-severe electrographic encephalopathy with additional left hemispheric dysfunction.  The focal sharp waves are consistent with the interictal state of partial epilepsy.  Clinical correlation is recommended.   Corbin Thomson M.D., Professor of Neurology       D: 10/09/2017 19:08   T: 10/09/2017 20:22   MT: LQ      Name:     ASHTYN MIGUEL    MRN:      [id number]        Account:        [id number]   :      1955           Procedure Date: 10/08/2017      Document: [id number]

## 2017-10-10 NOTE — PLAN OF CARE
Problem: Patient Care Overview  Goal: Plan of Care/Patient Progress Review  Outcome: No Change  Neuros stable overnight. Propofol drip 5-15mcg/kg/min, fentanyl at 200mcg/h. Patient continues to have no movement of the RUE but does move other extremities.Pupils equal and sluggish. EEG remains in place. BPs continue to be quite labile. Urine output ~40ml/h.

## 2017-10-10 NOTE — PHARMACY-AMINOGLYCOSIDE DOSING SERVICE
Pharmacy Aminoglycoside Follow-Up Note  Date of Service October 10, 2017  Patient's  1955   62 year old, male    Weight (adjusted)= 78.2 kg    Indication: Endocarditis  Current Gentamicin regimen:  230 mg IV q24h  Day of therapy: 5    Target goals based on synergy dosing  Goal Peak level: 3-5 mg/L  Goal Trough level: <1 mg/L    Current estimated CrCl: Estimated Creatinine Clearance: 26.5 mL/min (based on Cr of 3.06).    Creatinine for last 3 days  10/8/2017:  3:28 AM Creatinine 1.10 mg/dL  10/9/2017:  3:16 AM Creatinine 1.46 mg/dL;  3:29 PM Creatinine 2.40 mg/dL  10/10/2017:  4:01 AM Creatinine 3.06 mg/dL    Nephrotoxins and other renal medications (Future)    Start     Dose/Rate Route Frequency Ordered Stop    10/10/17 0915  gentamicin (GARAMYCIN) infusion 100 mg      100 mg  over 60 Minutes Intravenous EVERY 24 HOURS 10/09/17 1924      10/07/17 1100  nafcillin IV 2 g vial to attach to  ml bag      2 g  over 1 Hours Intravenous EVERY 4 HOURS 10/07/17 1055            Contrast Orders - past 72 hours     None          Aminoglycoside Levels - past 2 days  10/9/2017: 10:04 AM Gentamicin Level 10.1 mg/L;  3:29 PM Gentamicin Level 6.3 mg/L  10/10/2017:  4:01 AM Gentamicin Level 2.8 mg/L    Aminoglycosides IV Administrations (past 72 hours)                   gentamicin (GARAMYCIN) infusion 100 mg (mg) 100 mg New Bag 10/10/17 1006    gentamicin (GARAMYCIN) 230 mg in NaCl 0.9 % 50 mL intermittent infusion (mg) 230 mg New Bag 10/09/17 0751     230 mg New Bag 10/08/17 0735                    Pharmacokinetic Analysis  Calculated Peak level: 11 mg/L  Calculated Trough level: 2.13 mg/L  Volume of distribution: 0.31 L/kg  Half-life: 9.7 hours        Interpretation of levels and current regimen:  Aminoglycoside levels are outside of goal range    Has serum creatinine changed greater than 50% in the last 72 hours: Yes    Urine output:  Slightly decreased urine output    Renal function: Worsening    Plan  1. Decrease  dose to 100mg q24 hours    2.  Method of evaluation: 2 post dose levels    3. Pharmacy will continue to follow and check levels  as appropriate in 1-3 Days      Linda Treviño, PharmD, BCPS  October 10, 2017

## 2017-10-10 NOTE — PROGRESS NOTES
CLINICAL NUTRITION SERVICES - progress toward nutrition POC. See 10/7 note for full assessment.     -FEN/GI: Impact Peptide @ 10 mL/hr. FT migrated post-pyloric overnight per AM AXR.     Interventions  -Collaboration with other providers - Discussed w/ CVICU team on rounds who approved advancement to goal. Ordered to advance to Impact Peptide @ goal 55 ml/hr (1320 ml/day) to provide 1980 kcals (26 kcal/kg/day), 124 g PRO (1.6 g/kg/day), 1016 ml free H2O, 84 g Fat (50% from MCTs), 185 g CHO and no Fiber daily. **Note, slightly higher that previously recommended goal rate to better meet nutritional needs.    RD will continue to follow.    Maricel Childers RD, LD, [facility]  CVICU Dietitian  Pager: [phone]

## 2017-10-10 NOTE — PROGRESS NOTES
CV ICU   Progress Note:    S/Interval History:  Overnight no events.      O:  Temp: 101.1  F (38.4  C) Temp  Min: 99.7  F (37.6  C)  Max: 101.1  F (38.4  C)  Resp: 18 Resp  Min: 16  Max: 18  SpO2: 96 % SpO2  Min: 92 %  Max: 98 %    No Data Recorded  Heart Rate: 100 Heart Rate  Min: 75  Max: 100  BP: 131/77 Systolic (24hrs), Av , Min:131 , Max:149   Diastolic (24hrs), Av, Min:77, Max:89    Ventilation Mode: CMV/AC  FiO2 (%): 40 %  Rate Set (breaths/minute): 16 breaths/min  Tidal Volume Set (mL): 500 mL  PEEP (cm H2O): 5 cmH2O  Oxygen Concentration (%): 40 %  Resp: 18     Date 10/10/17 0700 - 10/11/17 0659   Shift 9237-3356 9961-0821 9522-3821 24 Hour Total   I  N  T  A  K  E   I.V. 921.24   921.24    NG/   210    Enteral 145   145    Shift Total  (mL/kg) 1276.24  (14.32)   1276.24  (14.32)   O  U  T  P  U  T   Urine 375   375    Shift Total  (mL/kg) 375  (4.21)   375  (4.21)   Weight (kg) 89.13 89.13 89.13 89.13       Physical Exam    General: Intubated, mechanically ventilated, non responsive   Neck: Supple, no tracheal deviation  Cardiovascular: RRR  Pumonary: Non labored breathing on MV.  CTAB   Abdomen: Soft, non distended, non tender.   Ext: No peripheral edema, appropriate distal perfusion   Neuro: Non focal        Lab Results   Component Value Date    WBC 10.9 10/10/2017    HGB 11.0 (L) 10/10/2017    HCT 33.9 (L) 10/10/2017     10/10/2017     (H) 10/10/2017    POTASSIUM 4.4 10/10/2017    CHLORIDE 118 (H) 10/10/2017    CO2 25 10/10/2017    BUN 44 (H) 10/10/2017    CR 3.06 (H) 10/10/2017     (H) 10/10/2017    SED 6 2013    DD 1.7 (H) 2016    NTBNPI 2768 (H) 2016    NTBNP 1302 (H) 2016    TROPONIN <0.07 2005    TROPI 0.634 (HH) 10/08/2017    AST 24 10/06/2017    ALT 33 10/06/2017    ALKPHOS 85 10/06/2017    BILITOTAL 1.5 (H) 10/06/2017    INR 1.03 10/08/2017         Recent Labs  Lab 10/10/17  0401 10/09/17  1056 10/09/17  0551 10/09/17  0316   PH  7.36 7.39 7.42 7.47*   PCO2 46* 44 41 38   PO2 94 139* 113* 63*   HCO3 26 26 27 27   O2PER 40.0 50.0 60 50         A/P: 62 year old with history of AVR and ascending aortic repair in May 2016 presented with acute mental status changes found to have large moises-aortic fluid collection and multiple strokes likely septic emboli.    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  Tylenol for fevers, keep normothermia and normonatremia. Decrease sedation and Q1H neuro checks, appreciate neurology consult. Cerebral angiography tomorrow per neurosurgery.  Minimal sedation for neuro examination.   Resp: minimal vent settings, Continue CMV at current settings   CV: Hold ASA per neuro. 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 Monitor daily EKG. Goal SBP < 140 mmHg    GI/FEN: Post pyloric feeding tube in place, advance tube feeds per protocol to goal.     Renal: Good urine output. Continue to monitor.  Endo: Insulin gtt   ID:  Nafcillin 2g IV Q4 hours and Levofloxacin 750mg IV Q48 hours. Gentamicin IV daily (x14 days to 10/19/17). Daily blood cultures; need to assess for clearance.  Heme: no bleeding concerns at this time  Prophylaxis: H2 blocker, hold heparin per neuro recs  Dispo: Family conference tomorrow at 2 pm, cares per CV-ICU      Patient seen with Dr. Duncan, staff Intensivist       Sukhwinder Mejía MD  Anesthesiology Resident   [phone]

## 2017-10-10 NOTE — PLAN OF CARE
Problem: Restraint for Non-Violent/Non-Self-Destructive Behavior  Goal: Prevent/Manage Potential Problems  Maintain safety of patient and others during period of restraint.  Promote psychological and physical wellbeing.  Prevent injury to skin and involved body parts.  Promote nutrition, hydration, and elimination.   Outcome: No Change  Restraints continued due to patient attempting to pull at lines and ETT.

## 2017-10-10 NOTE — PROGRESS NOTES
Care Coordinator Progress Note     Admission Date/Time:  10/6/2017  Attending MD:  Ramesh Kuo, *     Data  Chart reviewed, discussed with interdisciplinary team.   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    Concerns with insurance coverage for discharge needs: None.  Current Living Situation: Patient lives with Meghna veliz.  Support System: Childrens and so- Supportive and Involved.  Pt has 3 kids.  Services Involved: None  Transportation: Family or Friend will provide  Barriers to Discharge: chronically ill    Coordination of Care   Care conf. arranged for tomorrow, 10/11 at 2:00 in 4A conf. Room.  Team members that have been informed about the care conf are; CVTS, CVICU, Neurosurgery, Neuro ICU and bedside RN.  CC visited pt Eloy alberto today and conformed care conf. time.  Pt dtr agreed with the time and stated she will inform the other family members.    Assessment  Pt is vented and sedated.  Met pt jose alfredo Meghnadolly Andrea and sister, Meghna yesterday afternoon to introduce self and for support.  CC role discussed and contact info provided.  Pt family request care conf. to be arranged with all the providers for update.     Plan  Anticipated Discharge Date:  TBD.  Anticipated Discharge Plan:   TBD.  Care conf. arranged for tomorrow, 10/11 at 2:00 in 4E conf. Room.      Matthew Keller, RN, PHN, BSN  4A and 4E/ ICU  Care Coordinator  Phone: [phone]  Pager: [phone]

## 2017-10-10 NOTE — PROCEDURES
[facility] EEG #-3 (Day 3 of Video-EEG Monitoring)    DATE OF RECORDING:  10/09/2017    DURATION OF RECORDIN hours, 35 minutes     CLINICAL SUMMARY:  This video-EEG monitoring procedure was performed in diagnostic evaluation of seizures in Ashtyn Miguel, who reportedly sustained bihemispheric cerebral infarctions in the setting of valvular heart disease.  He was noted to be sedated with propofol and fentanyl during mechanical ventilation at the time of this recording.      TECHNICAL SUMMARY:  This continuous EEG monitoring procedure was performed with 23 scalp electrodes in 10-20 system placements, and additional scalp, precordial and other surface electrodes used for electrical referencing and artifact detection.  Video monitoring was utilized and periodically reviewed by EEG technologists and the physician for electroclinical correlation.     EEG ACTIVITIES DURING SEDATED COMA:  During this recording, there were no state-specific activities or evidence of wake-sleep cycling.  There was ongoing mixed generalized 1-8 Hz delta-theta slowing, with additional independent left hemispheric polymorphic 1-4 Hz delta slowing.  There were occasional left frontocentral sharp waves.    No electrographic seizures were recorded.      SUMMARY OF DAY 3 OF VIDEO-EEG MONITORING:    he interictal EEG recording during sedated coma was  abnormal due to continuous generalized delta-theta slowing, with additional focal delta slowing over the left hemisphere, and occasional left frontocentral sharp waves.  No electrographic seizures were recorded.    These abnormalities indicate moderate-severe electrographic encephalopathy with additional left hemispheric dysfunction.  The focal sharp waves are consistent with the interictal state of partial epilepsy.  Clinical correlation is recommended.   Corbin Thomson M.D., Professor of Neurology       D: 10/10/2017 12:02   T: 10/10/2017 12:39   MT:       Name:     ASHTYN MIGUEL    MRN:      [id number]        Account:        [id number]   :      1955           Procedure Date: 10/09/2017      Document: [id number]

## 2017-10-10 NOTE — PROGRESS NOTES
CVTS Progress Note     Dissection of aorta, unspecified portion of aorta (H)  Sepsis due to other etiology (H)  NSTEMI (non-ST elevated myocardial infarction) (H)  Hypotension, unspecified hypotension type  S/P AVR (aortic valve replacement)    Subjective: no acute events but worsening of head CT last night per neurology and neurosurgery, started on hypertonic saline gtt.    Objective:  Temp:  [99.7  F (37.6  C)-101.1  F (38.4  C)] 99.9  F (37.7  C)  Heart Rate:  [75-94] 79  Resp:  [16-18] 16  BP: (131-149)/(77-89) 131/77  MAP:  [56 mmHg-111 mmHg] 72 mmHg  Arterial Line BP: ()/(43-83) 103/56  FiO2 (%):  [40 %] 40 %  SpO2:  [92 %-98 %] 94 %    Ventilation Mode: CMV/AC  FiO2 (%): 40 %  Rate Set (breaths/minute): 16 breaths/min  Tidal Volume Set (mL): 500 mL  PEEP (cm H2O): 5 cmH2O  Oxygen Concentration (%): 40 %  Resp: 16    I/O last 3 completed shifts:  In: 3403.04 [I.V.:2373.04; NG/GT:370; IV Piggyback:500]  Out: 930 [Urine:930]    PE:  General: Intubated, lightly sedated  Neck: Supple, no tracheal deviation  Cardiovascular: RRR  Pumonary: Non labored breathing on MV  Abdomen: Soft, non distended, non tender  Ext: minimal edema, warm  Neuro: moving all extremities spontaneously    BMP    Recent Labs  Lab 10/10/17  1010 10/10/17  0401 10/09/17  2158 10/09/17  1529 10/09/17  0316 10/09/17  0032 10/08/17  0328 10/07/17  1333   * 151* 146* 146* 146*  --  141  --    POTASSIUM  --  4.4  --  4.3 3.4 3.5 3.4  --    CHLORIDE  --  118*  --  113* 112*  --  106  --    CO2  --  25  --  25 26  --  26  --    BUN  --  44*  --  32* 21  --  14  --    CR  --  3.06*  --  2.40* 1.46*  --  1.10  --    GLC  --  162*  --  154* 140*  --  123*  --    MAG  --  2.6*  --  2.5* 2.1 2.1 2.1 2.1   PHOS  --  4.8*  --   --  2.5  --  2.4* 1.6*     CBC    Recent Labs  Lab 10/10/17  0401 10/09/17  2158 10/09/17  0316 10/08/17  0328 10/07/17  0422   WBC 10.9  --  3.9* 11.8* 11.0   HGB 11.0*  --  13.7 13.3 13.1*    146* 125* 138* 132*      INR/PTT    Recent Labs  Lab 10/08/17  0328 10/06/17  1322 10/06/17  1020   INR 1.03 1.25* 1.23*   PTT  --  32 32     ABG    Recent Labs  Lab 10/10/17  0401 10/09/17  1056 10/09/17  0551 10/09/17  0316   PH 7.36 7.39 7.42 7.47*   PCO2 46* 44 41 38   PO2 94 139* 113* 63*   HCO3 26 26 27 27     LFT    Recent Labs  Lab 10/06/17  1020   AST 24   ALT 33   ALKPHOS 85   BILITOTAL 1.5*   ALBUMIN 2.8*       A/P   62 year old with history of AVR and ascending aortic repair in May 2016 presented on 10/6 with acute mental status changes found to have large moises-aortic fluid collection and multiple strokes likely septic emboli.    Neuro: tylenol for fevers,continue neuro checks, IR angiogram today per neurology recommendations, ASA 81 mg, holding all other anticoagulants and frequent head CT evaluations  Resp: inability to protect airway requiring mechanical ventilation, on minimal vent settings, could wean to extubate if mental status improves   CV: goal normotension per neurology/neurosurgery  GI/FEN: advance tube feeds to goal  Renal: JOSE likely pre-renal due to hypotension yesterday; creatinine continues to rise but UOP improved with better SBP; continue to monitor for now no indications for dialysis  Endo: Insulin gtt, monitor with transition to tube feeds   ID: continue current antibiotic regimen pending ID recommendations, daily blood cultures   Heme: no bleeding concerns at this time, hold anticoagulation until further neuro recommendations  Prophylaxis: H2 blocker, no chemical dvt ppx due to evolving stroke  Lines: central line and arterial line: continue  Dispo: CV-ICU    Abdoulaye Andres MD  Surgery PGY-3

## 2017-10-10 NOTE — MR AVS SNAPSHOT
After Visit Summary   10/10/2017    Cricket Miguel    MRN: [id number]           Patient Information     Date Of Birth          1955        Visit Information        Provider Department      10/10/2017 7:00 AM P EEG TECH 4 [facility] EEG        Today's Diagnoses     Encephalopathy    -  1       Follow-ups after your visit        Who to contact     Please call your clinic at [phone] to:    Ask questions about your health    Make or cancel appointments    Discuss your medicines    Learn about your test results    Speak to your doctor   If you have compliments or concerns about an experience at your clinic, or if you wish to file a complaint, please contact [facility] Patient Relations at [phone] or email us at [email or web address]         Additional Information About Your Visit        MyChart Information     [employer] gives you secure access to your electronic health record. If you see a primary care provider, you can also send messages to your care team and make appointments. If you have questions, please call your primary care clinic.  If you do not have a primary care provider, please call [phone] and they will assist you.      [employer] is an electronic gateway that provides easy, online access to your medical records. With [employer], you can request a clinic appointment, read your test results, renew a prescription or communicate with your care team.     To access your existing account, please contact your [facility] or call [phone] for assistance.        Care EveryWhere ID     This is your Care EveryWhere ID. This could be used by other organizations to access your [place] medical records  [id number]         Blood Pressure from Last 3 Encounters:   10/09/17 131/77   10/06/17 125/86   02/06/17 132/84    Weight from Last 3 Encounters:   10/10/17 89.1 kg (196 lb 8 oz)   02/06/17 91.6 kg (202 lb)   01/09/17 87.5 kg  (193 lb)              Today, you had the following     No orders found for display         Today's Medication Changes      Notice     This visit is during an admission. Changes to the med list made in this visit will be reflected in the After Visit Summary of the admission.             Primary Care Provider Office Phone # Fax #    Dipak Gordillo [phone] [id number] [facility] [address]        Equal Access to Services     [facility] Center[facility]ARATH : Hadii aad ku hadasho Soomaali, waaxda luqadaha, qaybta kaalmada adeegyada, waxay idiin hayaan adeeg kharash la'aan . So [facility] [phone].    ATENCIÓN: Si habla español, tiene a mendiola disposición servicios gratuitos de asistencia lingüística. Llame al [phone].    We comply with applicable federal civil rights laws and [place] laws. We do not discriminate on the basis of race, color, national origin, age, disability, sex, sexual orientation, or gender identity.            Thank you!     Thank you for choosing [facility]  for your care. Our goal is always to provide you with excellent care. Hearing back from our patients is one way we can continue to improve our services. Please take a few minutes to complete the written survey that you may receive in the mail after your visit with us. Thank you!             Your Updated Medication List - Protect others around you: Learn how to safely use, store and throw away your medicines at www.disposemymeds.org.      Notice     This visit is during an admission. Changes to the med list made in this visit will be reflected in the After Visit Summary of the admission.

## 2017-10-10 NOTE — PLAN OF CARE
Problem: Restraint for Non-Violent/Non-Self-Destructive Behavior  Goal: Prevent/Manage Potential Problems  Maintain safety of patient and others during period of restraint.  Promote psychological and physical wellbeing.  Prevent injury to skin and involved body parts.  Promote nutrition, hydration, and elimination.   Outcome: No Change  Restraint still in place d/t pulling ETT.

## 2017-10-10 NOTE — CONSULTS
Endovascular Surgical Neuroradiology Consult Note:    Reason for consult:  Rule out mycotic aneurysm    History of present Illness:   63 yo man with hx aortic valve replacement in 2016 transferred initial went to [facility] with encephalopathy. He was intubated for airway protection prior to transfer. Initial CTH w/out acute pathology; CT chest/abd/pelv showed fluid collection with enhancing margin around aortic root & ascending aorta. Transferred to [facility] for further mgmt. He continued to be encephalopathic after weaning off of sedation so neurology was consulted. A subsequent MRI revealed bilateral embolic strokes including large Lt PICA stroke with cerebral edema. He is on 3% saline for latter and has not needed decompressive surgery yet. His BCx has grown MSSA and OWEN revealed vegetations on ventricular leaflet of aortic valve suggestive of endocarditis. Primary team has requested a diagnostic cerebral angiogram to rule out mycotic aneurysms.      Past Medical History:  Past Medical History:   Diagnosis Date     AI (aortic insufficiency)      Aortic root dilatation (H)      AR (aortic regurgitation)     severe     Cardiomyopathy (H)      CHF (congestive heart failure), NYHA class II (H)      COPD, mild (H)     spirometry 12/16     Heart murmur      Hyperlipidemia LDL goal <70 10/31/2010     Hypertension goal BP (blood pressure) < 140/90      Inguinal hernia      left lung nodule  1/29/2008     Major depressive disorder, single episode, moderate (H)      Psoriasis childhood     Tobacco use disorder        Past Surgical History:  Past Surgical History:   Procedure Laterality Date     ARTHROSCOPY KNEE INCISION AND DRAINAGE Left 10/8/2017    Procedure: ARTHROSCOPY KNEE INCISION AND DRAINAGE;  Arthroscopic Incision and Drainage of Left Knee;  Surgeon: Lucretia Munoz MD;  Location: [facility] OR      SHOULDER ARTHROSCOPY, DX  2001    Arthroscopy, Shoulder RT Dr OSIRIS Andersen      SHOULDER ARTHROSCOPY, DX   1/04    LT arthroscopy Dr OSIRIS Andersen     HERNIA REPAIR, UMBILICAL  1/08    incarcerated - dr rockwell     HERNIORRHAPHY INGUINAL  12/21/2012    HERNIORRHAPHY INGUINAL;  Right Inguinal Hernia Repair with mesh ;  Surgeon: Mello Rockwell MD;  Location: [facility] OR     REPLACE VALVE AORTIC N/A 5/17/2016    REPLACE VALVE AORTIC - Dr Bowers     ROTATOR CUFF REPAIR RT/LT  11/10    Rt arthroscopy - Dr OSIRIS Andersen     SURGICAL HISTORY OF -   5/16    AVR, severe AR, aortic root repair       Social History:  Social History     Social History     Marital status:      Spouse name: N/A     Number of children: 3     Years of education: 12     Occupational History     Not on file.     Social History Main Topics     Smoking status: Former Smoker     Packs/day: 1.00     Years: 35.00     Quit date: 4/1/2016     Smokeless tobacco: Never Used      Comment: 4/2016     Alcohol use 0.0 oz/week     0 Standard drinks or equivalent per week      Comment: 1 drink per week avg, seldom     Drug use: Yes      Comment: marijuana- daily     Sexual activity: Yes     Partners: Female     Other Topics Concern     Caffeine Concern Yes     3 cups     Sleep Concern No     Special Diet No     trying to watch salt     Exercise Yes     walking     Seat Belt No     Parent/Sibling W/ Cabg, Mi Or Angioplasty Before 65f 55m? No     Social History Narrative       Family History:  Family History   Problem Relation Age of Onset     Genetic Disorder Mother      Bone plateover growth Agustin. shoulder surgeries     CANCER Mother      brain cancer     Alzheimer Disease Father        Home Medications:  Current Facility-Administered Medications   Medication     polyethylene glycol (MIRALAX/GLYCOLAX) Packet 17 g     levofloxacin (LEVAQUIN) infusion 750 mg     famotidine (PEPCID) injection 20 mg     sodium chloride 3% injection     gentamicin (GARAMYCIN) infusion 100 mg     aspirin suspension 80 mg     [START ON 10/15/2017] influenza quadrivalent (PF) vacc age 3 yrs and  older (FLUZONE or Flulaval) injection 0.5 mL     [START ON 10/15/2017] pneumococcal vaccine (PNEUMOVAX 23-sophy) injection 0.5 mL     fentaNYL (SUBLIMAZE) infusion     sodium chloride (PF) 0.9% PF flush 3 mL     sodium chloride (PF) 0.9% PF flush 3 mL     May take oral meds with a sip of water, the morning of OWEN procedure.     HOLD: Insulin - REGULAR AM of procedure IF diabetic     HOLD: Insulin - RAPID/SHORT acting AM of procedure IF diabetic     HOLD: Oral hypoglycemics AM of procedure IF diabetic     Give   of usual dose of LONG ACTING insulin AM of procedure IF diabetic     dextrose 10 % 1,000 mL infusion     multivitamins with minerals (CERTAVITE/CEROVITE) liquid 15 mL     nafcillin IV 2 g vial to attach to  ml bag     acetaminophen (TYLENOL) solution 650 mg     senna-docusate (SENOKOT-S;PERICOLACE) 8.6-50 MG per tablet 1-2 tablet     prochlorperazine (COMPAZINE) tablet 5-10 mg    Or     prochlorperazine (COMPAZINE) injection 5-10 mg     propofol (DIPRIVAN) infusion     naloxone (NARCAN) injection 0.1-0.4 mg     dextrose 10 % 1,000 mL infusion     insulin 1 unit/mL in saline (NovoLIN, HumuLIN Regular) drip - ADULT IV Infusion     glucose 40 % gel 15-30 g    Or     dextrose 50 % injection 25-50 mL    Or     glucagon injection 1 mg     lidocaine 1 % 1 mL     lidocaine (LMX4) kit     sodium chloride (PF) 0.9% PF flush 3 mL     sodium chloride (PF) 0.9% PF flush 3 mL     Reason beta blocker order not selected     albumin human 5 % injection 500-1,000 mL     hydrALAZINE (APRESOLINE) injection 10 mg     insulin aspart (NovoLOG) inj (RAPID ACTING)     meperidine (DEMEROL) injection 12.5-25 mg     albuterol (PROAIR HFA/PROVENTIL HFA/VENTOLIN HFA) Inhaler 6 puff     albuterol neb solution 2.5 mg     fentaNYL (PF) (SUBLIMAZE) injection  mcg     potassium chloride SA (K-DUR/KLOR-CON M) CR tablet 20-40 mEq     potassium chloride (KLOR-CON) Packet 20-40 mEq     potassium chloride 10 mEq in 100 mL intermittent  "infusion     potassium chloride 10 mEq in 100 mL intermittent infusion with 10 mg lidocaine     potassium chloride 20 mEq in 100 mL NON-STANDARD CONC intermittent infusion     magnesium sulfate 2 g in NS intermittent infusion (PharMEDium or FV Cmpd)     magnesium sulfate 4 g in 100 mL sterile water (premade)     potassium phosphate 10 mmol in D5W 250 mL intermittent infusion     potassium phosphate 15 mmol in D5W 250 mL intermittent infusion     potassium phosphate 20 mmol in D5W 500 mL intermittent infusion     potassium phosphate 20 mmol in D5W 250 mL intermittent infusion     potassium phosphate 25 mmol in D5W 500 mL intermittent infusion     diphenhydrAMINE (BENADRYL) capsule 25 mg    Or     diphenhydrAMINE (BENADRYL) injection 25 mg     ondansetron (ZOFRAN-ODT) ODT tab 4 mg    Or     ondansetron (ZOFRAN) injection 4 mg       Allergies:  Allergies   Allergen Reactions     Lisinopril Swelling     One-sided facial swelling after being on lisinopril/HCTZ for one week.        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    Attending physician: Dr. Angela Kidd Male  ESN fellow  [phone]    I have reviewed the history.I have seen and examined the patient myself and agree with the assessment and plan above.  PINO Miller MD    "

## 2017-10-10 NOTE — PROCEDURES
[facility] EEG #-4 (Day 4 of Video-EEG Monitoring)    DATE OF RECORDING:  10/10/2017    DURATION OF RECORDIN hours, 34 minutes.    CLINICAL SUMMARY:  This video-EEG monitoring procedure was performed in evaluation of seizures in Cricket Miguel, who reportedly sustained bihemispheric cerebral infarctions in the setting of valvular heart disease.  He was noted to be sedated with propofol and fentanyl during mechanical ventilation at the time of this recording.      TECHNICAL SUMMARY:  This continuous EEG monitoring procedure was performed with 23 scalp electrodes in 10-20 system placements, and additional scalp, precordial and other surface electrodes used for electrical referencing and artifact detection.  Video monitoring was utilized and periodically reviewed by EEG technologists and the physician for electroclinical correlation.     EEG ACTIVITIES DURING SEDATED COMA:  During this recording, there were no state-specific activities or evidence of wake-sleep cycling.  There was ongoing mixed generalized 1-8 Hz delta-theta slowing, with additional independent left hemispheric polymorphic 1-4 Hz delta slowing.  There were occasional left frontocentral sharp waves.    No electrographic seizures were recorded.     SUMMARY OF DAY 4 OF VIDEO-EEG MONITORING:    The interictal EEG recording was abnormal due to generalized delta-theta slowing, with left hemispheric delta slowing and occasional left frontocentral sharp waves.  No electrographic seizures were recorded on this day of monitoring.      SUMMARY OF 4 DAYS OF VIDEO-EEG MONITORING:         During ongoing sedated coma, the interictal EEG recording was persistently abnormal due to continuous generalized delta-theta slowing, with additional focal delta slowing over the left hemisphere, and occasional left frontocentral sharp waves.  No electrographic seizures were recorded during the 4 days of monitoring.         These findings indicate moderate-severe  electrographic encephalopathy with additional left hemispheric dysfunction.  The left frontocentral sharp waves are consistent with the interictal state of partial epilepsy.  Clinical correlation is recommended.   Corbin Thomson M.D., Professor of Neurology      D: 10/10/2017 12:05   T: 10/10/2017 12:27   MT: JEAN-PIERRE      Name:     ASHTYN STROUD   MRN:      [id number]        Account:        [id number]   :      1955           Procedure Date: 10/10/2017      Document: [id number]

## 2017-10-10 NOTE — PROGRESS NOTES
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 Following closely for large evolving cerebellar infarct.     RECOMMENDATIONS:  - Neuro checks q 1H  - Normonatremia, normothermia  - SBP < 140  - Daily CT head scans. This AM stable compared to last night's. Will monitor exam closely and continue daily scans.   - To angio possibly tomorrow.   - No chemical dvt ppx  - SCDs only   - Other cares per primary team.     The patient was discussed with Dr. Leggett, neurosurgery chief resident, and he agrees with the above.    Melanie Ramos MD  Neurosurgery, PGY-3    Overnight events:  MAYELIN. Resting comfortably, intubated with light sedation.     EXAM:  /77  Pulse 82  Temp 99.9  F (37.7  C) (Bladder)  Resp 16  Ht 1.727 m (5' 8\")  Wt 89.1 kg (196 lb 8 oz)  SpO2 94%  BMI 29.88 kg/m2  Neuro:   Intubated, sedated.   PERRL, tracks in all direction. Face symmetric. Gag intact. Corneal intact.   Withdraw x RUE and RLE. Moves LUE and LLE spontaneously but not to command.     LABS/IMAGING:  Lab Results   Component Value Date    WBC 10.9 10/10/2017     Lab Results   Component Value Date    RBC 3.71 10/10/2017     Lab Results   Component Value Date    HGB 11.0 10/10/2017     Lab Results   Component Value Date    HCT 33.9 10/10/2017     Lab Results   Component Value Date    MCV 91 10/10/2017     Lab Results   Component Value Date    MCH 29.6 10/10/2017     Lab Results   Component Value Date    MCHC 32.4 10/10/2017     Lab Results   Component Value Date    RDW 16.5 10/10/2017     Lab Results   Component Value Date     10/10/2017     Last Basic Metabolic Panel:  Lab Results   Component Value Date     10/10/2017      Lab Results   Component Value Date    POTASSIUM 4.4 " 10/10/2017     Lab Results   Component Value Date    CHLORIDE 118 10/10/2017     Lab Results   Component Value Date    IZABELLA 7.5 10/10/2017     Lab Results   Component Value Date    CO2 25 10/10/2017     Lab Results   Component Value Date    BUN 44 10/10/2017     Lab Results   Component Value Date    CR 3.06 10/10/2017     Lab Results   Component Value Date     10/10/2017         CT Head w/o Contrast   Final Result   Impression: Continued expected edematous changes of the infarctions   involving multiple vascular territories in the left cerebral and   cerebellar hemispheres. No hemorrhagic transformation. Given the mass   effect on the fourth ventricle, continued monitoring for development   of hydrocephalus would likely be of benefit.      I have personally reviewed the examination and initial interpretation   and I agree with the findings.      CARMEN ARROYO MD      XR Abdomen Port 1 View   Final Result   IMPRESSION:    1. Feeding tube tip now projects over the distal duodenum.   2. Nonobstructive bowel gas pattern.      I have personally reviewed the examination and initial interpretation   and I agree with the findings.      BONNIE ROWLAND MD      CT Head w/o Contrast   Final Result   Impression:    1. No significant change in appearance of evolving infarctions   involving multiple vascular territories compared to 10/8/2017 with   stable adjacent sulcal effacement and slightly decreased mass effect   on the fourth ventricle. No evidence of hemorrhagic transformation or   herniation. Unchanged ventricular sizes.   2. Stable chronic encephalomalacia in the posterior left occipital   lobe.      I have personally reviewed the examination and initial interpretation   and I agree with the findings.      CARMEN ARROYO MD      XR Abdomen Port 1 View   Final Result   Impression:     1. Enteric tube tip projects near the gastric antrum/duodenal bulb.   Gastric tube tip and sidehole project over the stomach.   2.  Nonspecific paucity of bowel gas in the abdomen.      I have personally reviewed the examination and initial interpretation   and I agree with the findings.      ROBERT JARA MD      CT Head w/o Contrast   Final Result   Impression:    1. No significant change since 10/7/2017. Stable late acute infarcts   involving multiple vascular territories. Stable localized mass effect.   No hemorrhagic transformation, midline shift, herniation or   hydrocephalus.   2. Stable chronic left posterior cerebral artery and posterior left   cerebral hemispheric border zone territory infarct.   3. Stable moderate chronic small vessel ischemic disease.      YESI GOMEZ MD      CT Head w/o Contrast   Final Result   Impression:    1. Expected interval evolution of supratentorial and infratentorial   infarcts.   2. No acute intracranial hemorrhage.   3. Mass effect with partial effacement of the fourth ventricle. No   hydrocephalus.      I have personally reviewed the examination and initial interpretation   and I agree with the findings.      NELSY LICONA MD      XR Abdomen Port 1 View   Final Result   Impression: OG tube tip and sidehole project over the stomach.      KENNY DOWELLSEUN      MR Brain for Stroke Cmpl w/o & w Contras   Final Result   Abnormal   Impression:   1. Bilateral regions of acute infarction involving multiple vascular   territories, suggestive of embolic phenomenon.   2. Chronic left posterior cerebral artery territory and posterior left   hemispheric border zone infarcts.   3. Moderate chronic small vessel ischemic disease.   4. Widely patent major intracranial and cervical arteries. No stenosis   or aneurysm.      [Urgent Result: Acute infarction]      Finding was identified on 10/6/2017 11:15 PM.       Dr. Pinedo was contacted by Dr. Ramesh Aguila at 10/6/2017 11:15   PM and verbalized understanding of the urgent finding.       I have personally reviewed the examination and initial interpretation    and I agree with the findings.      YESI GOMEZ MD      XR Chest Port 1 View   Final Result   Impression:     1. Slightly increased small left pleural effusion and adjacent basilar   atelectasis and/or consolidation.   2. Stable cardiomegaly and mild pulmonary vascular congestion.   3. Stable support devices.       I have personally reviewed the examination and initial interpretation   and I agree with the findings.      KENNY ELIZABETH      XR Knee Port Left 1/2 Views   Final Result   IMPRESSION:    1. No acute osseous adenopathy.   2. Mild degenerative changes of the left knee, most pronounced in the   medial compartment.      I have personally reviewed the examination and initial interpretation   and I agree with the findings.      BONNIE ROWLAND MD      XR Chest Port 1 View   Final Result   IMPRESSION:    1. Cardiomegaly with pulmonary vascular congestion.   2. Unchanged widening of the mediastinum, which corresponds with the   mediastinal hematoma or periaortic abscess seen on CT 10/6/2017.      I have personally reviewed the examination and initial interpretation   and I agree with the findings.      SAMANTHA CHRISTOPHER MD            I have reviewed the history above and agree with the resident's assessment and plan.  PINO Miller MD

## 2017-10-11 ENCOUNTER — APPOINTMENT (OUTPATIENT)
Dept: CT IMAGING | Facility: CLINIC | Age: 62
DRG: 004 | End: 2017-10-11
Attending: STUDENT IN AN ORGANIZED HEALTH CARE EDUCATION/TRAINING PROGRAM
Payer: COMMERCIAL

## 2017-10-11 ENCOUNTER — APPOINTMENT (OUTPATIENT)
Dept: INTERVENTIONAL RADIOLOGY/VASCULAR | Facility: CLINIC | Age: 62
DRG: 004 | End: 2017-10-11
Attending: STUDENT IN AN ORGANIZED HEALTH CARE EDUCATION/TRAINING PROGRAM
Payer: COMMERCIAL

## 2017-10-11 ENCOUNTER — APPOINTMENT (OUTPATIENT)
Dept: GENERAL RADIOLOGY | Facility: CLINIC | Age: 62
DRG: 004 | End: 2017-10-11
Attending: STUDENT IN AN ORGANIZED HEALTH CARE EDUCATION/TRAINING PROGRAM
Payer: COMMERCIAL

## 2017-10-11 ENCOUNTER — APPOINTMENT (OUTPATIENT)
Dept: PHYSICAL THERAPY | Facility: CLINIC | Age: 62
DRG: 004 | End: 2017-10-11
Attending: STUDENT IN AN ORGANIZED HEALTH CARE EDUCATION/TRAINING PROGRAM
Payer: COMMERCIAL

## 2017-10-11 LAB
ANION GAP SERPL CALCULATED.3IONS-SCNC: 8 MMOL/L (ref 3–14)
BASE DEFICIT BLDA-SCNC: 1.7 MMOL/L
BUN SERPL-MCNC: 60 MG/DL (ref 7–30)
CALCIUM SERPL-MCNC: 8.1 MG/DL (ref 8.5–10.1)
CHLORIDE SERPL-SCNC: 130 MMOL/L (ref 94–109)
CO2 SERPL-SCNC: 24 MMOL/L (ref 20–32)
CREAT SERPL-MCNC: 3.35 MG/DL (ref 0.66–1.25)
ERYTHROCYTE [DISTWIDTH] IN BLOOD BY AUTOMATED COUNT: 17.2 % (ref 10–15)
GENTAMICIN SERPL-MCNC: 1 MG/L
GENTAMICIN SERPL-MCNC: 1.6 MG/L
GFR SERPL CREATININE-BSD FRML MDRD: 19 ML/MIN/1.7M2
GLUCOSE SERPL-MCNC: 201 MG/DL (ref 70–99)
HCO3 BLD-SCNC: 23 MMOL/L (ref 21–28)
HCT VFR BLD AUTO: 40.8 % (ref 40–53)
HGB BLD-MCNC: 13.4 G/DL (ref 13.3–17.7)
INTERPRETATION ECG - MUSE: NORMAL
INTERPRETATION ECG - MUSE: NORMAL
MAGNESIUM SERPL-MCNC: 3.1 MG/DL (ref 1.6–2.3)
MCH RBC QN AUTO: 30.1 PG (ref 26.5–33)
MCHC RBC AUTO-ENTMCNC: 32.8 G/DL (ref 31.5–36.5)
MCV RBC AUTO: 92 FL (ref 78–100)
O2/TOTAL GAS SETTING VFR VENT: 40 %
OSMOLALITY SERPL: 358 MMOL/KG (ref 280–301)
OSMOLALITY SERPL: 367 MMOL/KG (ref 280–301)
OSMOLALITY SERPL: 367 MMOL/KG (ref 280–301)
OSMOLALITY SERPL: 369 MMOL/KG (ref 280–301)
PCO2 BLD: 39 MM HG (ref 35–45)
PH BLD: 7.38 PH (ref 7.35–7.45)
PHOSPHATE SERPL-MCNC: 3.6 MG/DL (ref 2.5–4.5)
PLATELET # BLD AUTO: 238 10E9/L (ref 150–450)
PO2 BLD: 81 MM HG (ref 80–105)
POTASSIUM SERPL-SCNC: 3.8 MMOL/L (ref 3.4–5.3)
RBC # BLD AUTO: 4.45 10E12/L (ref 4.4–5.9)
SODIUM SERPL-SCNC: 161 MMOL/L (ref 133–144)
SODIUM SERPL-SCNC: 162 MMOL/L (ref 133–144)
SODIUM SERPL-SCNC: 165 MMOL/L (ref 133–144)
SODIUM SERPL-SCNC: 166 MMOL/L (ref 133–144)
SODIUM SERPL-SCNC: 166 MMOL/L (ref 133–144)
WBC # BLD AUTO: 13.4 10E9/L (ref 4–11)

## 2017-10-11 PROCEDURE — 80048 BASIC METABOLIC PNL TOTAL CA: CPT | Performed by: ORTHOPAEDIC SURGERY

## 2017-10-11 PROCEDURE — 40000986 XR CHEST PORT 1 VW

## 2017-10-11 PROCEDURE — 99152 MOD SED SAME PHYS/QHP 5/>YRS: CPT

## 2017-10-11 PROCEDURE — B3121ZZ FLUOROSCOPY OF LEFT SUBCLAVIAN ARTERY USING LOW OSMOLAR CONTRAST: ICD-10-PCS | Performed by: NEUROLOGICAL SURGERY

## 2017-10-11 PROCEDURE — 84100 ASSAY OF PHOSPHORUS: CPT | Performed by: ORTHOPAEDIC SURGERY

## 2017-10-11 PROCEDURE — 27210907 ZZH KIT CR9

## 2017-10-11 PROCEDURE — 71010 XR CHEST PORT 1 VW: CPT

## 2017-10-11 PROCEDURE — 83930 ASSAY OF BLOOD OSMOLALITY: CPT | Performed by: STUDENT IN AN ORGANIZED HEALTH CARE EDUCATION/TRAINING PROGRAM

## 2017-10-11 PROCEDURE — 25000128 H RX IP 250 OP 636: Performed by: NEUROLOGICAL SURGERY

## 2017-10-11 PROCEDURE — 99153 MOD SED SAME PHYS/QHP EA: CPT

## 2017-10-11 PROCEDURE — 87077 CULTURE AEROBIC IDENTIFY: CPT | Performed by: STUDENT IN AN ORGANIZED HEALTH CARE EDUCATION/TRAINING PROGRAM

## 2017-10-11 PROCEDURE — 27210908 ZZH NEEDLE CR4

## 2017-10-11 PROCEDURE — 25000125 ZZHC RX 250: Performed by: THORACIC SURGERY (CARDIOTHORACIC VASCULAR SURGERY)

## 2017-10-11 PROCEDURE — 25000128 H RX IP 250 OP 636: Performed by: STUDENT IN AN ORGANIZED HEALTH CARE EDUCATION/TRAINING PROGRAM

## 2017-10-11 PROCEDURE — 40000281 ZZH STATISTIC TRANSPORT TIME EA 15 MIN

## 2017-10-11 PROCEDURE — 93005 ELECTROCARDIOGRAM TRACING: CPT

## 2017-10-11 PROCEDURE — 94003 VENT MGMT INPAT SUBQ DAY: CPT

## 2017-10-11 PROCEDURE — 82803 BLOOD GASES ANY COMBINATION: CPT | Performed by: ORTHOPAEDIC SURGERY

## 2017-10-11 PROCEDURE — 27210437 ZZH NUTRITION PRODUCT SEMIELEM INTERMED LITER

## 2017-10-11 PROCEDURE — C1887 CATHETER, GUIDING: HCPCS

## 2017-10-11 PROCEDURE — 99291 CRITICAL CARE FIRST HOUR: CPT | Mod: 25 | Performed by: INTERNAL MEDICINE

## 2017-10-11 PROCEDURE — B3111ZZ FLUOROSCOPY OF RIGHT BRACHIOCEPHALIC-SUBCLAVIAN ARTERY USING LOW OSMOLAR CONTRAST: ICD-10-PCS | Performed by: NEUROLOGICAL SURGERY

## 2017-10-11 PROCEDURE — 25000125 ZZHC RX 250: Performed by: SURGERY

## 2017-10-11 PROCEDURE — 27210802 ZZH SHEATH CR1

## 2017-10-11 PROCEDURE — B31Q1ZZ FLUOROSCOPY OF CERVICO-CEREBRAL ARCH USING LOW OSMOLAR CONTRAST: ICD-10-PCS | Performed by: NEUROLOGICAL SURGERY

## 2017-10-11 PROCEDURE — 25000128 H RX IP 250 OP 636: Performed by: THORACIC SURGERY (CARDIOTHORACIC VASCULAR SURGERY)

## 2017-10-11 PROCEDURE — 36223 PLACE CATH CAROTID/INOM ART: CPT | Mod: 50

## 2017-10-11 PROCEDURE — 84295 ASSAY OF SERUM SODIUM: CPT | Performed by: NEUROLOGICAL SURGERY

## 2017-10-11 PROCEDURE — 25000132 ZZH RX MED GY IP 250 OP 250 PS 637: Performed by: SURGERY

## 2017-10-11 PROCEDURE — 25000128 H RX IP 250 OP 636: Performed by: SURGERY

## 2017-10-11 PROCEDURE — 80170 ASSAY OF GENTAMICIN: CPT | Performed by: STUDENT IN AN ORGANIZED HEALTH CARE EDUCATION/TRAINING PROGRAM

## 2017-10-11 PROCEDURE — C1769 GUIDE WIRE: HCPCS

## 2017-10-11 PROCEDURE — 93010 ELECTROCARDIOGRAM REPORT: CPT | Performed by: INTERNAL MEDICINE

## 2017-10-11 PROCEDURE — 70450 CT HEAD/BRAIN W/O DYE: CPT

## 2017-10-11 PROCEDURE — 40000275 ZZH STATISTIC RCP TIME EA 10 MIN

## 2017-10-11 PROCEDURE — B41F1ZZ FLUOROSCOPY OF RIGHT LOWER EXTREMITY ARTERIES USING LOW OSMOLAR CONTRAST: ICD-10-PCS | Performed by: NEUROLOGICAL SURGERY

## 2017-10-11 PROCEDURE — 83735 ASSAY OF MAGNESIUM: CPT | Performed by: ORTHOPAEDIC SURGERY

## 2017-10-11 PROCEDURE — 85027 COMPLETE CBC AUTOMATED: CPT | Performed by: ORTHOPAEDIC SURGERY

## 2017-10-11 PROCEDURE — 27210889 ZZH ACCESSORY CR8

## 2017-10-11 PROCEDURE — 25000132 ZZH RX MED GY IP 250 OP 250 PS 637: Performed by: THORACIC SURGERY (CARDIOTHORACIC VASCULAR SURGERY)

## 2017-10-11 PROCEDURE — 83930 ASSAY OF BLOOD OSMOLALITY: CPT | Performed by: ORTHOPAEDIC SURGERY

## 2017-10-11 PROCEDURE — 87040 BLOOD CULTURE FOR BACTERIA: CPT | Performed by: STUDENT IN AN ORGANIZED HEALTH CARE EDUCATION/TRAINING PROGRAM

## 2017-10-11 PROCEDURE — 36415 COLL VENOUS BLD VENIPUNCTURE: CPT | Performed by: STUDENT IN AN ORGANIZED HEALTH CARE EDUCATION/TRAINING PROGRAM

## 2017-10-11 PROCEDURE — S0028 INJECTION, FAMOTIDINE, 20 MG: HCPCS | Performed by: THORACIC SURGERY (CARDIOTHORACIC VASCULAR SURGERY)

## 2017-10-11 PROCEDURE — 40000193 ZZH STATISTIC PT WARD VISIT: Performed by: PHYSICAL THERAPIST

## 2017-10-11 PROCEDURE — 20000004 ZZH R&B ICU UMMC

## 2017-10-11 PROCEDURE — B3151ZZ FLUOROSCOPY OF BILATERAL COMMON CAROTID ARTERIES USING LOW OSMOLAR CONTRAST: ICD-10-PCS | Performed by: NEUROLOGICAL SURGERY

## 2017-10-11 PROCEDURE — 25000128 H RX IP 250 OP 636: Performed by: PSYCHIATRY & NEUROLOGY

## 2017-10-11 PROCEDURE — 80170 ASSAY OF GENTAMICIN: CPT | Performed by: THORACIC SURGERY (CARDIOTHORACIC VASCULAR SURGERY)

## 2017-10-11 PROCEDURE — 84295 ASSAY OF SERUM SODIUM: CPT | Performed by: STUDENT IN AN ORGANIZED HEALTH CARE EDUCATION/TRAINING PROGRAM

## 2017-10-11 PROCEDURE — 97110 THERAPEUTIC EXERCISES: CPT | Mod: GP | Performed by: PHYSICAL THERAPIST

## 2017-10-11 PROCEDURE — S0032 INJECTION, NAFCILLIN SODIUM: HCPCS | Performed by: SURGERY

## 2017-10-11 PROCEDURE — 40000014 ZZH STATISTIC ARTERIAL MONITORING DAILY

## 2017-10-11 PROCEDURE — C1760 CLOSURE DEV, VASC: HCPCS

## 2017-10-11 RX ORDER — IODIXANOL 320 MG/ML
150 INJECTION, SOLUTION INTRAVASCULAR ONCE
Status: COMPLETED | OUTPATIENT
Start: 2017-10-11 | End: 2017-10-11

## 2017-10-11 RX ORDER — FLUMAZENIL 0.1 MG/ML
0.2 INJECTION, SOLUTION INTRAVENOUS
Status: DISCONTINUED | OUTPATIENT
Start: 2017-10-11 | End: 2017-10-14

## 2017-10-11 RX ORDER — SODIUM CHLORIDE 9 MG/ML
INJECTION, SOLUTION INTRAVENOUS CONTINUOUS
Status: DISCONTINUED | OUTPATIENT
Start: 2017-10-11 | End: 2017-10-12

## 2017-10-11 RX ORDER — NALOXONE HYDROCHLORIDE 0.4 MG/ML
.1-.4 INJECTION, SOLUTION INTRAMUSCULAR; INTRAVENOUS; SUBCUTANEOUS
Status: DISCONTINUED | OUTPATIENT
Start: 2017-10-11 | End: 2017-10-14

## 2017-10-11 RX ORDER — SODIUM CHLORIDE 9 MG/ML
INJECTION, SOLUTION INTRAVENOUS CONTINUOUS
Status: DISCONTINUED | OUTPATIENT
Start: 2017-10-11 | End: 2017-10-11

## 2017-10-11 RX ORDER — ONDANSETRON 2 MG/ML
4 INJECTION INTRAMUSCULAR; INTRAVENOUS EVERY 6 HOURS PRN
Status: DISCONTINUED | OUTPATIENT
Start: 2017-10-11 | End: 2017-11-04 | Stop reason: HOSPADM

## 2017-10-11 RX ORDER — GENTAMICIN SULFATE 100 MG/100ML
100 INJECTION, SOLUTION INTRAVENOUS ONCE
Status: COMPLETED | OUTPATIENT
Start: 2017-10-11 | End: 2017-10-11

## 2017-10-11 RX ORDER — FENTANYL CITRATE 50 UG/ML
25-50 INJECTION, SOLUTION INTRAMUSCULAR; INTRAVENOUS EVERY 5 MIN PRN
Status: DISCONTINUED | OUTPATIENT
Start: 2017-10-11 | End: 2017-10-14

## 2017-10-11 RX ORDER — ONDANSETRON 4 MG/1
4 TABLET, ORALLY DISINTEGRATING ORAL EVERY 6 HOURS PRN
Status: DISCONTINUED | OUTPATIENT
Start: 2017-10-11 | End: 2017-11-04 | Stop reason: HOSPADM

## 2017-10-11 RX ADMIN — PROPOFOL 15 MCG/KG/MIN: 10 INJECTION, EMULSION INTRAVENOUS at 07:39

## 2017-10-11 RX ADMIN — NAFCILLIN SODIUM 2 G: 2 INJECTION, POWDER, LYOPHILIZED, FOR SOLUTION INTRAMUSCULAR; INTRAVENOUS at 10:15

## 2017-10-11 RX ADMIN — PROPOFOL 20 MCG/KG/MIN: 10 INJECTION, EMULSION INTRAVENOUS at 13:06

## 2017-10-11 RX ADMIN — HYDRALAZINE HYDROCHLORIDE 20 MG: 20 INJECTION INTRAMUSCULAR; INTRAVENOUS at 02:32

## 2017-10-11 RX ADMIN — GENTAMICIN SULFATE 100 MG: 100 INJECTION, SOLUTION INTRAVENOUS at 20:15

## 2017-10-11 RX ADMIN — FENTANYL CITRATE 200 MCG/HR: 50 INJECTION, SOLUTION INTRAMUSCULAR; INTRAVENOUS at 21:05

## 2017-10-11 RX ADMIN — FENTANYL CITRATE 200 MCG/HR: 50 INJECTION, SOLUTION INTRAMUSCULAR; INTRAVENOUS at 14:00

## 2017-10-11 RX ADMIN — NAFCILLIN SODIUM 2 G: 2 INJECTION, POWDER, LYOPHILIZED, FOR SOLUTION INTRAMUSCULAR; INTRAVENOUS at 18:22

## 2017-10-11 RX ADMIN — POTASSIUM CHLORIDE 20 MEQ: 200 INJECTION, SOLUTION INTRAVENOUS at 04:51

## 2017-10-11 RX ADMIN — SODIUM CHLORIDE: 9 INJECTION, SOLUTION INTRAVENOUS at 10:54

## 2017-10-11 RX ADMIN — NAFCILLIN SODIUM 2 G: 2 INJECTION, POWDER, LYOPHILIZED, FOR SOLUTION INTRAMUSCULAR; INTRAVENOUS at 02:20

## 2017-10-11 RX ADMIN — SENNOSIDES AND DOCUSATE SODIUM 2 TABLET: 8.6; 5 TABLET ORAL at 10:16

## 2017-10-11 RX ADMIN — SODIUM CHLORIDE: 9 INJECTION, SOLUTION INTRAVENOUS at 13:23

## 2017-10-11 RX ADMIN — SENNOSIDES AND DOCUSATE SODIUM 1 TABLET: 8.6; 5 TABLET ORAL at 20:14

## 2017-10-11 RX ADMIN — PROPOFOL 30 MCG/KG/MIN: 10 INJECTION, EMULSION INTRAVENOUS at 18:22

## 2017-10-11 RX ADMIN — FENTANYL CITRATE 200 MCG/HR: 50 INJECTION, SOLUTION INTRAMUSCULAR; INTRAVENOUS at 06:49

## 2017-10-11 RX ADMIN — IODIXANOL 75 ML: 320 INJECTION, SOLUTION INTRAVASCULAR at 09:41

## 2017-10-11 RX ADMIN — NAFCILLIN SODIUM 2 G: 2 INJECTION, POWDER, LYOPHILIZED, FOR SOLUTION INTRAMUSCULAR; INTRAVENOUS at 22:14

## 2017-10-11 RX ADMIN — NAFCILLIN SODIUM 2 G: 2 INJECTION, POWDER, LYOPHILIZED, FOR SOLUTION INTRAMUSCULAR; INTRAVENOUS at 14:01

## 2017-10-11 RX ADMIN — HYDRALAZINE HYDROCHLORIDE 20 MG: 20 INJECTION INTRAMUSCULAR; INTRAVENOUS at 03:06

## 2017-10-11 RX ADMIN — MULTIVITAMIN 15 ML: LIQUID ORAL at 10:14

## 2017-10-11 RX ADMIN — NAFCILLIN SODIUM 2 G: 2 INJECTION, POWDER, LYOPHILIZED, FOR SOLUTION INTRAMUSCULAR; INTRAVENOUS at 06:00

## 2017-10-11 RX ADMIN — FAMOTIDINE 20 MG: 10 INJECTION, SOLUTION INTRAVENOUS at 11:02

## 2017-10-11 RX ADMIN — SODIUM CHLORIDE: 9 INJECTION, SOLUTION INTRAVENOUS at 00:02

## 2017-10-11 RX ADMIN — POLYETHYLENE GLYCOL 3350 17 G: 17 POWDER, FOR SOLUTION ORAL at 10:16

## 2017-10-11 ASSESSMENT — VISUAL ACUITY
OU: OTHER (SEE COMMENT)

## 2017-10-11 NOTE — PHARMACY-AMINOGLYCOSIDE DOSING SERVICE
Pharmacy Aminoglycoside Follow-Up Note  Date of Service 2017  Patient's  1955   62 year old, male    Weight (adjusted)= 78.2 kg     Indication: Endocarditis  Current Gentamicin regimen:  100 mg IV q24h  Day of therapy: 6    Target goals based on synergy dosing  Goal Peak level: 3-5 mg/L  Goal Trough level: <1 mg/L    Current estimated CrCl: Estimated Creatinine Clearance: 24.4 mL/min (based on Cr of 3.35).    Creatinine for last 3 days  10/9/2017:  3:16 AM Creatinine 1.46 mg/dL;  3:29 PM Creatinine 2.40 mg/dL  10/10/2017:  4:01 AM Creatinine 3.06 mg/dL  10/11/2017:  3:12 AM Creatinine 3.35 mg/dL    Nephrotoxins and other renal medications (Future)    Start     Dose/Rate Route Frequency Ordered Stop    10/11/17 1311  gentamicin (GARAMYCIN) place vega - receiving intermittent dosing      1 each Does not apply SEE ADMIN INSTRUCTIONS 10/11/17 1311      10/07/17 1100  nafcillin IV 2 g vial to attach to  ml bag      2 g  over 1 Hours Intravenous EVERY 4 HOURS 10/07/17 1055            Contrast Orders - past 72 hours (72h ago through future)    Start     Dose/Rate Route Frequency Ordered Stop    10/11/17 0830  iodixanol (VISIPAQUE 320) injection 150 mL      150 mL Intravenous ONCE 10/11/17 0826 10/11/17 0941          Aminoglycoside Levels - past 2 days  10/9/2017:  3:29 PM Gentamicin Level 6.3 mg/L  10/10/2017:  4:01 AM Gentamicin Level 2.8 mg/L  10/11/2017: 10:30 AM Gentamicin Level 1.6 mg/L    Aminoglycosides IV Administrations (past 72 hours)                   gentamicin (GARAMYCIN) infusion 100 mg (mg) 100 mg New Bag 10/10/17 1006    gentamicin (GARAMYCIN) 230 mg in NaCl 0.9 % 50 mL intermittent infusion (mg) 230 mg New Bag 10/09/17 0751                Pharmacokinetic Analysis  Trough level drawn = 1.6    Interpretation of levels and current regimen:  Aminoglycoside levels are outside of goal range    Has serum creatinine changed greater than 50% in the last 72 hours: Yes    Urine output:   good urine output    Renal function: Worsening    Plan  1. Will delay next dose until 2100 tonight until gentamicin level is <1 (~36h from last dose), then will give one time dose of gentamicin 100 mg. Will set up 2 post dose levels following that dose.    2.  Method of evaluation: trough    Kinjal Neff ,PharmD  Pager [phone]

## 2017-10-11 NOTE — PROGRESS NOTES
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 Following closely for large evolving cerebellar infarct.     RECOMMENDATIONS:  - Neuro checks q 1H  - Normonatremia ok  - SBP < 140  - Daily CT head scans. This AM stable.   - To angio possibly today  - No chemical dvt ppx  - SCDs only   - Other cares per primary team.     The patient was discussed with Dr. Leggett, neurosurgery chief resident, and he agrees with the above.    Melanie Ramos MD  Neurosurgery, PGY-3    Overnight events:  MAYELIN. Resting comfortably, intubated with light sedation.     EXAM:  /85  Pulse 82  Temp 99.3  F (37.4  C) (Bladder)  Resp 18  Ht 1.727 m (5' 8\")  Wt 91.2 kg (201 lb)  SpO2 94%  BMI 30.56 kg/m2  Neuro:   Intubated, sedated.   PERRL, tracks in all direction. Face symmetric. Gag intact. Corneal intact.   Withdraw x RUE and RLE. Moves LUE and LLE spontaneously but not to command.     LABS/IMAGING:  Lab Results   Component Value Date    WBC 13.4 10/11/2017     Lab Results   Component Value Date    RBC 4.45 10/11/2017     Lab Results   Component Value Date    HGB 13.4 10/11/2017     Lab Results   Component Value Date    HCT 40.8 10/11/2017     Lab Results   Component Value Date    MCV 92 10/11/2017     Lab Results   Component Value Date    MCH 30.1 10/11/2017     Lab Results   Component Value Date    MCHC 32.8 10/11/2017     Lab Results   Component Value Date    RDW 17.2 10/11/2017     Lab Results   Component Value Date     10/11/2017     Last Basic Metabolic Panel:  Lab Results   Component Value Date     10/11/2017      Lab Results   Component Value Date    POTASSIUM 3.8 10/11/2017     Lab Results   Component Value Date    CHLORIDE 130 10/11/2017     Lab Results   Component " Value Date    IZABELLA 8.1 10/11/2017     Lab Results   Component Value Date    CO2 24 10/11/2017     Lab Results   Component Value Date    BUN 60 10/11/2017     Lab Results   Component Value Date    CR 3.35 10/11/2017     Lab Results   Component Value Date     10/11/2017         CT Head w/o Contrast   Final Result   Impression: Continued expected edematous changes of the infarctions   involving multiple vascular territories in the left cerebral and   cerebellar hemispheres. No hemorrhagic transformation. Given the mass   effect on the fourth ventricle, continued monitoring for development   of hydrocephalus would likely be of benefit.      I have personally reviewed the examination and initial interpretation   and I agree with the findings.      CARMEN ARROYO MD      XR Abdomen Port 1 View   Final Result   IMPRESSION:    1. Feeding tube tip now projects over the distal duodenum.   2. Nonobstructive bowel gas pattern.      I have personally reviewed the examination and initial interpretation   and I agree with the findings.      BONNIE ROWLAND MD      CT Head w/o Contrast   Final Result   Impression:    1. No significant change in appearance of evolving infarctions   involving multiple vascular territories compared to 10/8/2017 with   stable adjacent sulcal effacement and slightly decreased mass effect   on the fourth ventricle. No evidence of hemorrhagic transformation or   herniation. Unchanged ventricular sizes.   2. Stable chronic encephalomalacia in the posterior left occipital   lobe.      I have personally reviewed the examination and initial interpretation   and I agree with the findings.      CARMEN ARROYO MD      XR Abdomen Port 1 View   Final Result   Impression:     1. Enteric tube tip projects near the gastric antrum/duodenal bulb.   Gastric tube tip and sidehole project over the stomach.   2. Nonspecific paucity of bowel gas in the abdomen.      I have personally reviewed the examination and  initial interpretation   and I agree with the findings.      ROBERT JARA MD      CT Head w/o Contrast   Final Result   Impression:    1. No significant change since 10/7/2017. Stable late acute infarcts   involving multiple vascular territories. Stable localized mass effect.   No hemorrhagic transformation, midline shift, herniation or   hydrocephalus.   2. Stable chronic left posterior cerebral artery and posterior left   cerebral hemispheric border zone territory infarct.   3. Stable moderate chronic small vessel ischemic disease.      YESI GOMEZ MD      CT Head w/o Contrast   Final Result   Impression:    1. Expected interval evolution of supratentorial and infratentorial   infarcts.   2. No acute intracranial hemorrhage.   3. Mass effect with partial effacement of the fourth ventricle. No   hydrocephalus.      I have personally reviewed the examination and initial interpretation   and I agree with the findings.      NELSY LICONA MD      XR Abdomen Port 1 View   Final Result   Impression: OG tube tip and sidehole project over the stomach.      KENNY ELIZABETH      MR Brain for Stroke Cmpl w/o & w Contras   Final Result   Abnormal   Impression:   1. Bilateral regions of acute infarction involving multiple vascular   territories, suggestive of embolic phenomenon.   2. Chronic left posterior cerebral artery territory and posterior left   hemispheric border zone infarcts.   3. Moderate chronic small vessel ischemic disease.   4. Widely patent major intracranial and cervical arteries. No stenosis   or aneurysm.      [Urgent Result: Acute infarction]      Finding was identified on 10/6/2017 11:15 PM.       Dr. Pinedo was contacted by Dr. Ramesh Aguila at 10/6/2017 11:15   PM and verbalized understanding of the urgent finding.       I have personally reviewed the examination and initial interpretation   and I agree with the findings.      YESI GOMEZ MD      XR Chest Port 1 View   Final Result    Impression:     1. Slightly increased small left pleural effusion and adjacent basilar   atelectasis and/or consolidation.   2. Stable cardiomegaly and mild pulmonary vascular congestion.   3. Stable support devices.       I have personally reviewed the examination and initial interpretation   and I agree with the findings.      KENNY ELIZABETH      XR Knee Port Left 1/2 Views   Final Result   IMPRESSION:    1. No acute osseous adenopathy.   2. Mild degenerative changes of the left knee, most pronounced in the   medial compartment.      I have personally reviewed the examination and initial interpretation   and I agree with the findings.      BONNIE ROWLAND MD      XR Chest Port 1 View   Final Result   IMPRESSION:    1. Cardiomegaly with pulmonary vascular congestion.   2. Unchanged widening of the mediastinum, which corresponds with the   mediastinal hematoma or periaortic abscess seen on CT 10/6/2017.      I have personally reviewed the examination and initial interpretation   and I agree with the findings.      SAMANTHA CHRISTOPHER MD      IR Carotid Cerebral Angiogram Bilateral    (Results Pending)   CT Head w/o Contrast    (Results Pending)   XR Chest Port 1 View    (Results Pending)         I have reviewed the history above and agree with the resident's assessment and plan.  PINO Miller MD

## 2017-10-11 NOTE — PLAN OF CARE
Problem: Patient Care Overview  Goal: Plan of Care/Patient Progress Review  Outcome: No Change  Patient on 10-20mcg/kg/min of Propofol and 200mcg fentanyl overnight. He does not follow commands but does get quite restless with cares. Moving left side spontaneously but does not move right side spontaneously or to painful stimulation. Pupil equal and sluggish. Na levels increased overnight. 3% drip stopped at midnight. HTN overnight, hydralazine given multiple times to maintain SBP <140. Increased urine output. Feeds held at midnight for cerebral angiogram today. Communication with MDs throughout the night on POC and changes.

## 2017-10-11 NOTE — PROCEDURES
[facility], [place]     Endovascular Surgical Neuroradiology Post-Procedure Note    Pre-Procedure Diagnosis:  Left hemisphere infarcts, endocarditis  Post-Procedure Diagnosis:  same    Procedure(s):   Diagnostic cervicocerebral angiography    Findings:  No evidence of mycotic aneurysm, obvious filling defects from known infarcts    Primary Surgeon:  Dr. Susie Miller  Fellow:  Waleska Lambert Sajja      Prior to the start of the procedure and with procedural staff participation, I verbally confirmed: the patient s identity using two indicators, relevant allergies, that the procedure was appropriate and matched the consent or emergent situation, and that the correct equipment/implants were available. Immediately prior to starting the procedure I conducted the Time Out with the procedural staff and re-confirmed the patient s name, procedure, and site/side. (The Joint Commission universal protocol was followed.)  Yes    PRU value: Not applicable    Anesthesia:  Conscious Sedation  Medications:  propofol  Puncture site:  Right Femoral Artery    Fluoroscopy time (minutes):  19.1   Radiation dose (mGy):  1051    Contrast amount (mL):  75     Estimated blood loss (mL): minimal    Closure:  Device - starclose    Disposition:  Will be followed in hospital by the ICU team.        Sedation Post-Procedure Summary    Sedatives: Propofol    Vital signs and pulse oximetry were monitored and remained stable throughout the procedure, and sedation was maintained until the procedure was complete.  The patient was monitored by staff until sedation discharge criteria were met.    Patient tolerance:  Patient tolerated the procedure well with no immediate complications.    Time of sedation in minutes:  90 minutes from beginning to end of physician one to one monitoring.    Michael Wilde  Pager:  [phone]

## 2017-10-11 NOTE — PLAN OF CARE
Problem: Restraint for Non-Violent/Non-Self-Destructive Behavior  Goal: Prevent/Manage Potential Problems  Maintain safety of patient and others during period of restraint.  Promote psychological and physical wellbeing.  Prevent injury to skin and involved body parts.  Promote nutrition, hydration, and elimination.   Outcome: No Change  LUE restraint remains on place due to pulling at ETT and lines.

## 2017-10-11 NOTE — PROGRESS NOTES
[facility] ICU   Progress Note:    S/Interval History:  Overnight no events.      O:  Temp: 99  F (37.2  C) Temp  Min: 99  F (37.2  C)  Max: 102  F (38.9  C)  Resp: 16 Resp  Min: 16  Max: 20  SpO2: 97 % SpO2  Min: 92 %  Max: 97 %    No Data Recorded  Heart Rate: 62 Heart Rate  Min: 62  Max: 101  BP: 115/61 Systolic (24hrs), Av , Min:95 , Max:194   Diastolic (24hrs), Av, Min:53, Max:101    Ventilation Mode: CMV/AC  FiO2 (%): 40 %  Rate Set (breaths/minute): 16 breaths/min  Tidal Volume Set (mL): 500 mL  PEEP (cm H2O): 5 cmH2O  Oxygen Concentration (%): 40 %  Resp: 18       Physical Exam    General: Intubated, mechanically ventilated, non responsive   Neck: Supple, no tracheal deviation  Cardiovascular: RRR  Pumonary: Non labored breathing on MV.  CTAB   Abdomen: Soft, non distended, non tender.   Ext: No peripheral edema, appropriate distal perfusion   Neuro: Not moving right upper and lower extremity     Lab Results   Component Value Date    WBC 13.4 (H) 10/11/2017    HGB 13.4 10/11/2017    HCT 40.8 10/11/2017     10/11/2017     (HH) 10/11/2017    POTASSIUM 3.8 10/11/2017    CHLORIDE 130 (H) 10/11/2017    CO2 24 10/11/2017    BUN 60 (H) 10/11/2017    CR 3.35 (H) 10/11/2017     (H) 10/11/2017    SED 6 2013    DD 1.7 (H) 2016    NTBNPI 2768 (H) 2016    NTBNP 1302 (H) 2016    TROPONIN <0.07 2005    TROPI 0.634 (HH) 10/08/2017    AST 24 10/06/2017    ALT 33 10/06/2017    ALKPHOS 85 10/06/2017    BILITOTAL 1.5 (H) 10/06/2017    INR 1.03 10/08/2017         Recent Labs  Lab 10/11/17  0312 10/10/17  0401 10/09/17  1056 10/09/17  0551   PH 7.38 7.36 7.39 7.42   PCO2 39 46* 44 41   PO2 81 94 139* 113*   HCO3 23 26 26 27   O2PER 40 40.0 50.0 60       A/P:  62 year old with history of AVR and ascending aortic repair in May 2016 presented with acute mental status changes found to have large moises-aortic fluid collection and multiple strokes likely septic emboli.      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  Tylenol for fevers, keep normothermia and normonatremia. Decrease sedation and Q1H neuro checks, appreciate neurology consult. Cerebral angiography tomorrow per neurosurgery.  Minimal sedation for neuro examination. Daily CT head scans. Today's angio result pending. Head CT stable.   Resp: minimal vent settings, Continue CMV at current settings   CV:  OWEN revealed vegetations on ventricular leaflet of aortic valve suggestive of endocarditis. Hold ASA per neuro. 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 Monitor daily EKG. Goal SBP < 140 mmHg    GI/FEN: Post pyloric feeding tube in place, keep infusing at goal rate.     Renal: Good urine output. Continue to monitor.  Endo: Insulin gtt   ID:  Nafcillin 2g IV Q4 hours and Levofloxacin 750mg IV Q48 hours. Gentamicin IV daily (x14 days to 10/19/17). Daily blood cultures; need to assess for clearance.  Heme: No bleeding concerns at this time  Prophylaxis: H2 blocker, hold heparin per neuro recs  Dispo: Family conference at 2 pm, cares per CV-ICU       Patient seen with Dr. Duncan, staff Intensivist        Sukhwinder Mejía MD  Anesthesiology Resident   [phone]

## 2017-10-11 NOTE — PROGRESS NOTES
Neuroscience Intensive Care Progress Note    10/10/2017      24 hour events:  Evolving infarct on repeat CT, stable clinical exam.     24 Hour Vital Signs Summary:  Temperatures:  Current - Temp: 101.7  F (38.7  C); Max - Temp  Av.7  F (38.2  C)  Min: 99.9  F (37.7  C)  Max: 101.7  F (38.7  C)  Respiration range: Resp  Av  Min: 16  Max: 20  Pulse range: No Data Recorded  Blood pressure range: Systolic (24hrs), Av , Min:131 , Max:131   ; Diastolic (24hrs), Av, Min:77, Max:77    Pulse oximetry range: SpO2  Av.6 %  Min: 92 %  Max: 97 %    Ventilator Settings  Ventilation Mode: CMV/AC  FiO2 (%): 40 %  Rate Set (breaths/minute): 16 breaths/min  Tidal Volume Set (mL): 500 mL  PEEP (cm H2O): 5 cmH2O  Oxygen Concentration (%): 40 %  Resp: 20      Intake/Output Summary (Last 24 hours) at 10/10/17 1932  Last data filed at 10/10/17 1900   Gross per 24 hour   Intake          3560.41 ml   Output             1430 ml   Net          2130.41 ml       Arterial Line BP: ()/(45-78) 123/66  MAP:  [56 mmHg-102 mmHg] 85 mmHg  BP - Mean:  [99] 99    Current Medications:    polyethylene glycol  17 g Oral Daily     levofloxacin  750 mg Intravenous Q48H     famotidine  20 mg Intravenous Q24H     gentamicin  100 mg Intravenous Q24H     [START ON 10/15/2017] influenza quadrivalent (PF) vacc age 3 yrs and older  0.5 mL Intramuscular Prior to discharge     [START ON 10/15/2017] pneumococcal vaccine  0.5 mL Intramuscular Prior to discharge     sodium chloride (PF)  3 mL Intravenous Q8H     multivitamins with minerals  15 mL Per Feeding Tube Daily     nafcillin  2 g Intravenous Q4H     senna-docusate  1-2 tablet Oral or Feeding Tube BID     sodium chloride (PF)  3 mL Intracatheter Q8H       PRN Medications:  sodium chloride (PF), - MEDICATION INSTRUCTIONS -, - MEDICATION INSTRUCTIONS -, acetaminophen, prochlorperazine **OR** prochlorperazine, naloxone, IV fluid REPLACEMENT ONLY, glucose **OR** dextrose **OR**  "glucagon, lidocaine (buffered or not buffered), lidocaine 4%, sodium chloride (PF), Reason beta blocker order not selected, albumin human, hydrALAZINE, insulin aspart, meperidine, albuterol, albuterol, fentaNYL, potassium chloride, potassium chloride, potassium chloride, potassium chloride with lidocaine, potassium chloride, magnesium sulfate, magnesium sulfate, potassium phosphate (KPHOS) in D5W IV, potassium phosphate (KPHOS) in D5W IV, potassium phosphate (KPHOS) in D5W IV, potassium phosphate (KPHOS) in D5W IV, potassium phosphate (KPHOS) in D5W IV, diphenhydrAMINE **OR** diphenhydrAMINE, ondansetron **OR** ondansetron    Infusions:    sodium chloride 3% 25 mL/hr at 10/10/17 1841     fentaNYL 200 mcg/hr (10/10/17 1800)     - MEDICATION INSTRUCTIONS -       - MEDICATION INSTRUCTIONS -       propofol (DIPRIVAN) infusion 10 mcg/kg/min (10/10/17 1800)     IV fluid REPLACEMENT ONLY       insulin (regular) Stopped (10/08/17 1821)     Reason beta blocker order not selected         Allergies   Allergen Reactions     Lisinopril Swelling     One-sided facial swelling after being on lisinopril/HCTZ for one week.        Physical Examination:  /77  Pulse 82  Temp 101.7  F (38.7  C) (Bladder)  Resp 20  Ht 1.727 m (5' 8\")  Wt 89.1 kg (196 lb 8 oz)  SpO2 96%  BMI 29.88 kg/m2  Lethargic - responds to painful stimuli. No gaze abnormality noted. PERRLA. Occulo cephalic intact. Moves left UE and LE with minimally painful stimuli. No movement on RUE noted. Minimal withdrawal on RLE.     Labs/Studies:  Recent Labs   Lab Test  10/10/17   1545  10/10/17   1010  10/10/17   0401  10/09/17   2158  10/09/17   1529  10/09/17   0316   10/08/17   0328   NA  160*  155*  151*  146*  146*  146*   --   141   POTASSIUM   --    --   4.4   --   4.3  3.4   < >  3.4   CHLORIDE   --    --   118*   --   113*  112*   --   106   CO2   --    --   25   --   25  26   --   26   ANIONGAP   --    --   8   --   9  9   --   10   GLC   --    --   162* "   --   154*  140*   --   123*   BUN   --    --   44*   --   32*  21   --   14   CR   --    --   3.06*   --   2.40*  1.46*   --   1.10   IZABELLA   --    --   7.5*   --   8.6  8.2*   --   8.5   WBC   --    --   10.9   --    --   3.9*   --   11.8*   RBC   --    --   3.71*   --    --   4.58   --   4.45   HGB   --    --   11.0*   --    --   13.7   --   13.3   PLT   --    --   155  146*   --   125*   --   138*    < > = values in this interval not displayed.       Recent Labs   Lab Test  10/08/17   0328  10/06/17   1322  10/06/17   1020  05/18/16   0435   INR  1.03  1.25*  1.23*  1.28*   PTT   --   32  32  35           Recent Labs  Lab 10/10/17  0401 10/09/17  1056 10/09/17  0551 10/09/17  0316   PH 7.36 7.39 7.42 7.47*   PCO2 46* 44 41 38   PO2 94 139* 113* 63*   HCO3 26 26 27 27   O2PER 40.0 50.0 60 50       Imaging: Reviewed    Assessment/Plan        62 year old with sepsis - prosthetic cortic valve endocarditis and fluid collection noted in the aortic root and ascending thoracic aorta found to have multiple embolic infarcts - including large cerebellar evolving infarct.      - Stable exam  - Repeat CT tomorrow am for evaluation of edema related to right PICA infarct.  - ASA 81 mg   - Continue to hold anticoagulation for now  - minimal sedation to assess neurologic exam  - Continue hypertonic saline goal Na 150-160  - Cerebral Angiogram planned tomorrow     Plan discussed with Dr. Miller.     Siddhartha Dyson  Fellow

## 2017-10-11 NOTE — PLAN OF CARE
Problem: Sepsis/Septic Shock (Adult)  Goal: Signs and Symptoms of Listed Potential Problems Will be Absent, Minimized or Managed (Sepsis/Septic Shock)  Signs and symptoms of listed potential problems will be absent, minimized or managed by discharge/transition of care (reference Sepsis/Septic Shock (Adult) CPG).   Outcome: No Change  Neuros remain unchanged, no response on right side extremities.   Sedated with propofol and fentanyl.   Minimal vent settings.   Great UOP.   TF increased to 35, increase to 45 at 2100.   EEG off this AM, small sore on L temporal.   Tylenol every 4, cooling blanket will be ordered.   Angio & CT planned for tomorrow. CC at 2pm.

## 2017-10-11 NOTE — CONSULTS
Social Work: Assessment with Discharge Plan    Patient Name:  Cricket Miguel  :  1955  Age:  62 year old  MRN:  [id number]  Risk/Complexity Score:     Completed assessment with:  Pt's three children and S.O., interdisciplinary team, team rounds, chart review    Presenting Information   Reason for Referral:  family care conference and assistance with financial questions.  Date of Intake:  2017  Referral Source:  Family  Decision Maker:  Per [facility] Policy, pt's three adult children Eloy ([phone]), Elma ([phone]), and Christian.  Alternate Decision Maker:  As above.  Health Care Directive:  Not on file.  Pt's family states pt filled out a document, but it was not signed or notarized.  Living Situation:  House with ELODIA Coffman.  Previous Functional Status:  Independent  Patient and family understanding of hospitalization:  Pt's family appears to have a good understanding of the critical nature of pt's medical status.  They voice understanding of the high risk of surgery that pt likely needs in two weeks.  Cultural/Language/Spiritual Considerations:  Rastafarian  Adjustment to Illness:  Pt's children all appear to be adjusting appropriately and are asking good questions.  They voice concern re: pt's ELODIA Coffman and are worried that she is not coping well as evidenced by she hasn't left the hospital and is focusing on minute details rather than big picture.    Physical Health  Reason for Admission:    1. S/P AVR (aortic valve replacement)    2. Dissection of aorta, unspecified portion of aorta (H)    3. Sepsis due to other etiology (H)    4. NSTEMI (non-ST elevated myocardial infarction) (H)    5. Hypotension, unspecified hypotension type    6. 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)      Services Needed/Recommended:  TBD pending medical course.    Mental Health/Chemical Dependency  Diagnosis:  none  Support/Services in Place:  none  Services Needed/Recommended:  none    Support  System  Significant relationship at present time:  S.O. is Meghna.  Pt and Meghna have been together for roughly five years and she is very involved and supportive.  Pt's wife  several years ago after a prolonged illness.  Family of origin is available for support:  Yes, pt has three adult children, Eloy, Elma, and Christian.  They are all very involved and supportive.  Other support available:  Pt has siblings, business partners, other family, and friends.  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  Pt's son is set to take over the company when pt retires.  According to pt's son, pt will continue to be paid and his insurance benefits will continue.  Spent a significant amount of time providing education to pt's three children re: POA, Conservatorship, MA, and Mona Care.  Strongly encouraged pt's children to contact an  to discuss specifics of financial matters as this is out of the scope of social work practice.  Christian plans to contact a family  and  to discuss Conservatorship and other financial needs.  Did send referral to Financial Counselor per family request to discuss MA eligibility with a spend down and Mona Care.  Financial Concerns:  None currently, but as above.  Pt's children are planning for pt's long term needs and are asking several appropriate questions in order to prepare.  Also provided guidance re: questions to ask pt's insurance company so that they understand his benefits and potential medical bills.  Insurance:    Payor/Plan Subscriber Name Rel Member # Group #   [employer] -  O* ASHTYN STROUD  [id number]      [address]       Discharge Plan   Patient and family discharge goal:  TBD  pending medical course, but family is aware that pt will require some level of rehab.  Provided education on discharge plan:  YES--discussed general differences between TCU, ARU, and LTACH.  Patient agreeable to discharge plan:  YES  A list of Medicare Certified Facilities was provided to the patient and/or family to encourage patient choice. Patient's choices for facility are:  No, not today as it is not yet determined what level of rehab will be most appropriate.  Will NH provide Skilled rehabilitation or complex medical:  YES  General information regarding anticipated insurance coverage and possible out of pocket cost was discussed. Patient and patient's family are aware patient may incur the cost of transportation to the facility, pending insurance payment: YES  Barriers to discharge:  Pt is not medically stable.  Pt will need to undergo CT next week to evaluate suspected infection with plan for likely surgery in two weeks.    Discharge Recommendations   Anticipated Disposition:  TBD pending medical course, but pt will require some level of rehab.  Transportation Needs:  TBD pending needs.  This is too early to determine.  Name of Transportation Company and Phone:  TBD    Additional comments   Attended care conference with pt's three children and S.O. Meghna along with CSI team and neuro-crit.  Spent a significant amount of time with pt's three children after care conference answering questions and providing education and guidance.  Provided emotional support - supportive listening, validation, encouragement, and anticipatory guidance.  SW will continue to follow.    KAMAR Mason, [facility]  Adult ICU Clinical   Pager [phone]

## 2017-10-11 NOTE — PROGRESS NOTES
Interventional Radiology Intra-procedural Nursing Note    Patient Name: Cricket Miguel  Medical Record Number: [id number]  Today's Date: October 11, 2017    Start Time: 0820  End of procedure time: 0940  Procedure: diagnostic cerebral angiogram and femoral arteriotomy closure device placement  Report given to: CASSIDY Staton RN     Other Notes:   0815  Pt. Identified, transported from SICU, vented, on fentanyl and propofol gtts adequately sedated, poositioned supine RDP palpable, consent reviewed. Prepped, time out.  0940  Tolerated procedure well, ICU sedation maintained, no additional drugs used. RFA puncture site closed with StarClose device, 3 hours bedrest  starting at 0940. Neuros unchanged PERRL 1mm brisk. Report to 4A RN.   Duane A Albee

## 2017-10-11 NOTE — PLAN OF CARE
Problem: Patient Care Overview  Goal: Plan of Care/Patient Progress Review  Neuro: Pt had cerebral angiogram in AM. Pupils are 2mm and equal and reactive. L side unresponsive to pain. R side intermittent movement, not to command. Sedated.   Respiratory: LS clear. On minimal vent settings. Minimal secretions.  GI/: Espinoza in place, creatinine elevated. Good UO. BM in afternoon. TF at 55/hr, goal. Free Water at 60q4.  Skin: Mottled, however cool to warm to touch. Good pulses in all extremities.   Cardiac: HR between 60s-70s. One episode of significant ST depression(16 beats), otherwise asymptomatic. BP goal under SBP of 140.  Sodium 165, with Osmolality of 367. MD aware.   Continue q1hr neuro checks, monitoring. Notify MD with any concerns.

## 2017-10-11 NOTE — H&P
[facility], [place]     Endovascular Surgical Neuroradiology Pre-Procedure Note      HPI:  61 yo man with hx aortic valve replacement in 2016 transferred initial went to [facility] with encephalopathy. He was intubated for airway protection prior to transfer. Initial CTH w/out acute pathology; CT chest/abd/pelv showed fluid collection with enhancing margin around aortic root & ascending aorta. Transferred to [facility] for further mgmt. He continued to be encephalopathic after weaning off of sedation so neurology was consulted. A subsequent MRI revealed bilateral embolic strokes including large Lt PICA stroke with cerebral edema. He is on 3% saline for latter and has not needed decompressive surgery yet. His BCx has grown MSSA and OWEN revealed vegetations on ventricular leaflet of aortic valve suggestive of endocarditis. Per primary team's request, he is undergoing diagnostic cerebral angiogram to rule out mycotic aneurysms.    Medical History:  Past Medical History:   Diagnosis Date     AI (aortic insufficiency)      Aortic root dilatation (H)      AR (aortic regurgitation)     severe     Cardiomyopathy (H)      CHF (congestive heart failure), NYHA class II (H)      COPD, mild (H)     spirometry 12/16     Heart murmur      Hyperlipidemia LDL goal <70 10/31/2010     Hypertension goal BP (blood pressure) < 140/90      Inguinal hernia      left lung nodule  1/29/2008     Major depressive disorder, single episode, moderate (H)      Psoriasis childhood     Tobacco use disorder        Surgical History:  Past Surgical History:   Procedure Laterality Date     ARTHROSCOPY KNEE INCISION AND DRAINAGE Left 10/8/2017    Procedure: ARTHROSCOPY KNEE INCISION AND DRAINAGE;  Arthroscopic Incision and Drainage of Left Knee;  Surgeon: Lucretia Munoz MD;  Location: [facility] OR      SHOULDER ARTHROSCOPY, DX  2001    Arthroscopy, Shoulder RT Dr OSIRIS Andersen      SHOULDER ARTHROSCOPY, DX  1/04    LT  arthroscopy Dr OSIRIS Andersen     HERNIA REPAIR, UMBILICAL  1/08    incarcerated - dr rockwell     HERNIORRHAPHY INGUINAL  12/21/2012    HERNIORRHAPHY INGUINAL;  Right Inguinal Hernia Repair with mesh ;  Surgeon: Mello Rockwell MD;  Location: [facility] OR     REPLACE VALVE AORTIC N/A 5/17/2016    REPLACE VALVE AORTIC - Dr Bowers     ROTATOR CUFF REPAIR RT/LT  11/10    Rt arthroscopy - Dr OSIRIS nAdersen     SURGICAL HISTORY OF -   5/16    AVR, severe AR, aortic root repair       Family History:  Family History   Problem Relation Age of Onset     Genetic Disorder Mother      Bone plateover growth Agustin. shoulder surgeries     CANCER Mother      brain cancer     Alzheimer Disease Father        Social History:  Social History     Social History     Marital status:      Spouse name: N/A     Number of children: 3     Years of education: 12     Occupational History     Not on file.     Social History Main Topics     Smoking status: Former Smoker     Packs/day: 1.00     Years: 35.00     Quit date: 4/1/2016     Smokeless tobacco: Never Used      Comment: 4/2016     Alcohol use 0.0 oz/week     0 Standard drinks or equivalent per week      Comment: 1 drink per week avg, seldom     Drug use: Yes      Comment: marijuana- daily     Sexual activity: Yes     Partners: Female     Other Topics Concern     Caffeine Concern Yes     3 cups     Sleep Concern No     Special Diet No     trying to watch salt     Exercise Yes     walking     Seat Belt No     Parent/Sibling W/ Cabg, Mi Or Angioplasty Before 65f 55m? No     Social History Narrative       Allergies:  Allergies   Allergen Reactions     Lisinopril Swelling     One-sided facial swelling after being on lisinopril/HCTZ for one week.        Is there a contrast allergy?  No    Medications:  Current Facility-Administered Medications   Medication     lidocaine 1 % 1 mL     lidocaine (LMX4) kit     sodium chloride (PF) 0.9% PF flush 3 mL     sodium chloride (PF) 0.9% PF flush 3 mL     0.9% sodium  chloride infusion     medication instruction     hydrALAZINE (APRESOLINE) injection 10-20 mg     polyethylene glycol (MIRALAX/GLYCOLAX) Packet 17 g     levofloxacin (LEVAQUIN) infusion 750 mg     famotidine (PEPCID) injection 20 mg     gentamicin (GARAMYCIN) infusion 100 mg     [START ON 10/15/2017] influenza quadrivalent (PF) vacc age 3 yrs and older (FLUZONE or Flulaval) injection 0.5 mL     [START ON 10/15/2017] pneumococcal vaccine (PNEUMOVAX 23-sophy) injection 0.5 mL     fentaNYL (SUBLIMAZE) infusion     sodium chloride (PF) 0.9% PF flush 3 mL     sodium chloride (PF) 0.9% PF flush 3 mL     May take oral meds with a sip of water, the morning of OWEN procedure.     Give   of usual dose of LONG ACTING insulin AM of procedure IF diabetic     multivitamins with minerals (CERTAVITE/CEROVITE) liquid 15 mL     nafcillin IV 2 g vial to attach to  ml bag     acetaminophen (TYLENOL) solution 650 mg     senna-docusate (SENOKOT-S;PERICOLACE) 8.6-50 MG per tablet 1-2 tablet     prochlorperazine (COMPAZINE) tablet 5-10 mg    Or     prochlorperazine (COMPAZINE) injection 5-10 mg     propofol (DIPRIVAN) infusion     naloxone (NARCAN) injection 0.1-0.4 mg     dextrose 10 % 1,000 mL infusion     insulin 1 unit/mL in saline (NovoLIN, HumuLIN Regular) drip - ADULT IV Infusion     glucose 40 % gel 15-30 g    Or     dextrose 50 % injection 25-50 mL    Or     glucagon injection 1 mg     lidocaine 1 % 1 mL     lidocaine (LMX4) kit     sodium chloride (PF) 0.9% PF flush 3 mL     sodium chloride (PF) 0.9% PF flush 3 mL     Reason beta blocker order not selected     albumin human 5 % injection 500-1,000 mL     insulin aspart (NovoLOG) inj (RAPID ACTING)     meperidine (DEMEROL) injection 12.5-25 mg     albuterol (PROAIR HFA/PROVENTIL HFA/VENTOLIN HFA) Inhaler 6 puff     albuterol neb solution 2.5 mg     fentaNYL (PF) (SUBLIMAZE) injection  mcg     potassium chloride SA (K-DUR/KLOR-CON M) CR tablet 20-40 mEq     potassium  chloride (KLOR-CON) Packet 20-40 mEq     potassium chloride 10 mEq in 100 mL intermittent infusion     potassium chloride 10 mEq in 100 mL intermittent infusion with 10 mg lidocaine     potassium chloride 20 mEq in 100 mL NON-STANDARD CONC intermittent infusion     magnesium sulfate 2 g in NS intermittent infusion (PharMEDium or FV Cmpd)     magnesium sulfate 4 g in 100 mL sterile water (premade)     potassium phosphate 10 mmol in D5W 250 mL intermittent infusion     potassium phosphate 15 mmol in D5W 250 mL intermittent infusion     potassium phosphate 20 mmol in D5W 500 mL intermittent infusion     potassium phosphate 20 mmol in D5W 250 mL intermittent infusion     potassium phosphate 25 mmol in D5W 500 mL intermittent infusion     diphenhydrAMINE (BENADRYL) capsule 25 mg    Or     diphenhydrAMINE (BENADRYL) injection 25 mg     ondansetron (ZOFRAN-ODT) ODT tab 4 mg    Or     ondansetron (ZOFRAN) injection 4 mg   .    ROS:  Review of systems not obtained due to patient factors - intubation    PHYSICAL EXAMINATION  Vital Signs:  B/P: 176/85,  T: 99.3,  P: 82,  R: 18    Cardio:  RRR  Pulmonary:  on mechanical ventilation  Abdomen:  soft  Intubated and sedated with propofol  Opens eyes to tactile stimulation  Pupils 3mm B/L reactive to light; conjugate gaze  Doesn't follow any commands  Left side purposeful antigravity. No response on Rt side  Sensations intact B/L to deep nailbed pressure    LABS  (most recent Cr, BUN, GFR, PLT, INR, PTT within the past 7 days):    Recent Labs  Lab 10/11/17  0312  10/08/17  0328  10/06/17  1322   CR 3.35*  < > 1.10  < > 1.07   BUN 60*  < > 14  < > 18   GFRESTIMATED 19*  < > 67  < > 69   GFRESTBLACK 23*  < > 81  < > 84     < > 138*  < > 150   INR  --   --  1.03  --  1.25*   PTT  --   --   --   --  32   < > = values in this interval not displayed.     Platelet Function P2Y12 (PRU):  Not applicable    ASSESSMENT:  Sepsis secondary to bacterial endocarditis of prosthetic aortic  "valve with concern of mycotic aneurysms    PLAN:  - Diagnostic cerebral angiogram today to rule out mycotic aneurysm. The benefits and risks discussed with patient's family (daughter, son & significant other) and consent was obtained from POA - daughter \"Eloy\". The risks that were discussed included but not limited to bleeding, dissection, pseudoaneurysm, infection, kidney injury, stroke and aortic dissection. The latter risk given presence of periaortic fluid collection places him at higher risk than usual and all the family members understand and agree to proceed.      PRE-PROCEDURE SEDATION ASSESSMENT     Pre-Procedure Sedation Assessment done at 0700.    Expected Level:  Moderate Sedation    Indication:  Sedation is required to allow for neurointerventional procedure.    Consent obtained from relative daughter \"Eloy\" after discussing the risks, benefits and alternatives.     PO Intake:  Appropriately NPO for procedure    ASA Class:  Class 4 - SEVERE SYSTEMIC DISEASE, ACUTE UNSTABLE PROBLEMS.    Mallampati: Intubated    History and physical reviewed and no updates needed. I have reviewed the lab findings, diagnostic data, medications, and the plan for sedation. I have determined this patient to be an appropriate candidate for the planned sedation and procedure and have reassessed the patient IMMEDIATELY PRIOR to sedation and procedure.    Patient was discussed with the Attending, Dr. Miller, who agrees with the plan.    Bernabe PORTER Male   Pager: [phone]          "

## 2017-10-11 NOTE — PROGRESS NOTES
CV Surgery  Progress Note:    S/Interval History:  Overnight no events.     O:  Temp: 99  F (37.2  C) Temp  Min: 99  F (37.2  C)  Max: 102  F (38.9  C)  Resp: 16 Resp  Min: 16  Max: 20  SpO2: 97 % SpO2  Min: 93 %  Max: 97 %    No Data Recorded  Heart Rate: 62 Heart Rate  Min: 62  Max: 101  BP: 115/61 Systolic (24hrs), Av , Min:95 , Max:194   Diastolic (24hrs), Av, Min:53, Max:101    Ventilation Mode: CMV/AC  FiO2 (%): 40 %  Rate Set (breaths/minute): 16 breaths/min  Tidal Volume Set (mL): 500 mL  PEEP (cm H2O): 5 cmH2O  Oxygen Concentration (%): 40 %  Resp: 16       Physical Exam    General: Intubated, mechanically ventilated, non responsive   Neck: Supple, no tracheal deviation  Cardiovascular: RRR  Pumonary: Non labored breathing on MV.  CTAB   Abdomen: Soft, non distended, non tender.   Ext: No peripheral edema, appropriate distal perfusion   Neuro: Not following commands      Lab Results   Component Value Date    WBC 13.4 (H) 10/11/2017    HGB 13.4 10/11/2017    HCT 40.8 10/11/2017     10/11/2017     (HH) 10/11/2017    POTASSIUM 3.8 10/11/2017    CHLORIDE 130 (H) 10/11/2017    CO2 24 10/11/2017    BUN 60 (H) 10/11/2017    CR 3.35 (H) 10/11/2017     (H) 10/11/2017    SED 6 2013    DD 1.7 (H) 2016    NTBNPI 2768 (H) 2016    NTBNP 1302 (H) 2016    TROPONIN <0.07 2005    TROPI 0.634 (HH) 10/08/2017    AST 24 10/06/2017    ALT 33 10/06/2017    ALKPHOS 85 10/06/2017    BILITOTAL 1.5 (H) 10/06/2017    INR 1.03 10/08/2017         Recent Labs  Lab 10/11/17  0312 10/10/17  0401 10/09/17  1056 10/09/17  0551   PH 7.38 7.36 7.39 7.42   PCO2 39 46* 44 41   PO2 81 94 139* 113*   HCO3 23 26 26 27   O2PER 40 40.0 50.0 60       A/P:  62 year old with history of AVR and ascending aortic repair in May 2016 presented with acute mental status changes found to have large moises-aortic fluid collection and multiple strokes likely septic emboli.      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  Tylenol for fevers, keep normothermia and normonatremia. Decrease sedation and Q1H neuro checks, appreciate neurology consult. Cerebral angiography tomorrow per neurosurgery.  Minimal sedation for neuro examination. Daily CT head scans. Today's angio result pending. Head CT stable.   Resp: minimal vent settings, Continue CMV at current settings   CV:  OWEN revealed vegetations on ventricular leaflet of aortic valve suggestive of endocarditis. Hold ASA per neuro. 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 Monitor daily EKG. Goal SBP < 140 mmHg, nicardipine added   GI/FEN: Post pyloric feeding tube in place, keep infusing at goal rate.     Renal: Good urine output. Continue to monitor.  Endo: Insulin gtt   ID:  Nafcillin 2g IV Q4 hours and Levofloxacin 750mg IV Q48 hours. Gentamicin IV daily (x14 days to 10/19/17). Daily blood cultures; need to assess for clearance.  Heme: No bleeding concerns at this time  Prophylaxis: H2 blocker, hold heparin per neuro recs  Dispo: Family conference at 2 pm, continue CVICU        Patient discussed with staff.    ____  Clemencia Appiah PA-C  Transplant Surgery  Pager: [phone]

## 2017-10-11 NOTE — PLAN OF CARE
Problem: Patient Care Overview  Goal: Plan of Care/Patient Progress Review  Discharge Planner PT   Patient plan for discharge: unknown, pt unable to state  Current status: PT 4A: PT following pt for ROM to B UEs and LEs as pt unable to participate, currently intubated and sedated. VSS while on vent, FiO2 40%, PEEP 5.   Barriers to return to prior living situation: vent dependence, medical concerns and significant immobility  Recommendations for discharge: recommend inpatient rehab when medically appropriate  Rationale for recommendations: far below baseline functional status, pt unsafe for home discharge       Entered by: Joycelyn Neri 10/11/2017 5:06 PM

## 2017-10-12 ENCOUNTER — APPOINTMENT (OUTPATIENT)
Dept: CT IMAGING | Facility: CLINIC | Age: 62
DRG: 004 | End: 2017-10-12
Attending: NURSE PRACTITIONER
Payer: COMMERCIAL

## 2017-10-12 ENCOUNTER — APPOINTMENT (OUTPATIENT)
Dept: GENERAL RADIOLOGY | Facility: CLINIC | Age: 62
DRG: 004 | End: 2017-10-12
Attending: STUDENT IN AN ORGANIZED HEALTH CARE EDUCATION/TRAINING PROGRAM
Payer: COMMERCIAL

## 2017-10-12 LAB
ANION GAP SERPL CALCULATED.3IONS-SCNC: 5 MMOL/L (ref 3–14)
ANION GAP SERPL CALCULATED.3IONS-SCNC: 8 MMOL/L (ref 3–14)
ANION GAP SERPL CALCULATED.3IONS-SCNC: 8 MMOL/L (ref 3–14)
ANION GAP SERPL CALCULATED.3IONS-SCNC: 9 MMOL/L (ref 3–14)
BASE DEFICIT BLDA-SCNC: 1.3 MMOL/L
BUN SERPL-MCNC: 74 MG/DL (ref 7–30)
BUN SERPL-MCNC: 78 MG/DL (ref 7–30)
BUN SERPL-MCNC: 78 MG/DL (ref 7–30)
BUN SERPL-MCNC: 79 MG/DL (ref 7–30)
CALCIUM SERPL-MCNC: 7.8 MG/DL (ref 8.5–10.1)
CALCIUM SERPL-MCNC: 7.8 MG/DL (ref 8.5–10.1)
CALCIUM SERPL-MCNC: 7.9 MG/DL (ref 8.5–10.1)
CALCIUM SERPL-MCNC: 8 MG/DL (ref 8.5–10.1)
CHLORIDE BLD-SCNC: >125 MMOL/L (ref 94–109)
CHLORIDE SERPL-SCNC: 134 MMOL/L (ref 94–109)
CHLORIDE SERPL-SCNC: 135 MMOL/L (ref 94–109)
CO2 SERPL-SCNC: 24 MMOL/L (ref 20–32)
CO2 SERPL-SCNC: 24 MMOL/L (ref 20–32)
CO2 SERPL-SCNC: 25 MMOL/L (ref 20–32)
CO2 SERPL-SCNC: 26 MMOL/L (ref 20–32)
CREAT SERPL-MCNC: 3.24 MG/DL (ref 0.66–1.25)
CREAT SERPL-MCNC: 3.27 MG/DL (ref 0.66–1.25)
CREAT SERPL-MCNC: 3.29 MG/DL (ref 0.66–1.25)
CREAT SERPL-MCNC: 3.31 MG/DL (ref 0.66–1.25)
ERYTHROCYTE [DISTWIDTH] IN BLOOD BY AUTOMATED COUNT: 18.2 % (ref 10–15)
GENTAMICIN SERPL-MCNC: 2.6 MG/L
GENTAMICIN SERPL-MCNC: 6 MG/L
GFR SERPL CREATININE-BSD FRML MDRD: 19 ML/MIN/1.7M2
GLUCOSE SERPL-MCNC: 178 MG/DL (ref 70–99)
GLUCOSE SERPL-MCNC: 180 MG/DL (ref 70–99)
GLUCOSE SERPL-MCNC: 194 MG/DL (ref 70–99)
GLUCOSE SERPL-MCNC: 209 MG/DL (ref 70–99)
HCO3 BLD-SCNC: 24 MMOL/L (ref 21–28)
HCT VFR BLD AUTO: 37.6 % (ref 40–53)
HGB BLD-MCNC: 12 G/DL (ref 13.3–17.7)
INTERPRETATION ECG - MUSE: NORMAL
MCH RBC QN AUTO: 29.4 PG (ref 26.5–33)
MCHC RBC AUTO-ENTMCNC: 31.9 G/DL (ref 31.5–36.5)
MCV RBC AUTO: 92 FL (ref 78–100)
O2/TOTAL GAS SETTING VFR VENT: 50 %
OSMOLALITY SERPL: 370 MMOL/KG (ref 280–301)
OSMOLALITY SERPL: 371 MMOL/KG (ref 280–301)
OSMOLALITY SERPL: 372 MMOL/KG (ref 280–301)
OSMOLALITY SERPL: 383 MMOL/KG (ref 280–301)
PCO2 BLD: 43 MM HG (ref 35–45)
PH BLD: 7.36 PH (ref 7.35–7.45)
PHOSPHATE SERPL-MCNC: 3.1 MG/DL (ref 2.5–4.5)
PLATELET # BLD AUTO: 266 10E9/L (ref 150–450)
PO2 BLD: 81 MM HG (ref 80–105)
POTASSIUM SERPL-SCNC: 4.1 MMOL/L (ref 3.4–5.3)
POTASSIUM SERPL-SCNC: 4.2 MMOL/L (ref 3.4–5.3)
POTASSIUM SERPL-SCNC: 4.2 MMOL/L (ref 3.4–5.3)
POTASSIUM SERPL-SCNC: 4.9 MMOL/L (ref 3.4–5.3)
RBC # BLD AUTO: 4.08 10E12/L (ref 4.4–5.9)
SODIUM BLD-SCNC: 167 MMOL/L (ref 133–144)
SODIUM SERPL-SCNC: 166 MMOL/L (ref 133–144)
SODIUM SERPL-SCNC: 166 MMOL/L (ref 133–144)
SODIUM SERPL-SCNC: 167 MMOL/L (ref 133–144)
SODIUM SERPL-SCNC: 167 MMOL/L (ref 133–144)
SODIUM SERPL-SCNC: 168 MMOL/L (ref 133–144)
SODIUM SERPL-SCNC: 169 MMOL/L (ref 133–144)
WBC # BLD AUTO: 11.5 10E9/L (ref 4–11)

## 2017-10-12 PROCEDURE — 83930 ASSAY OF BLOOD OSMOLALITY: CPT | Performed by: STUDENT IN AN ORGANIZED HEALTH CARE EDUCATION/TRAINING PROGRAM

## 2017-10-12 PROCEDURE — 25000132 ZZH RX MED GY IP 250 OP 250 PS 637: Performed by: SURGERY

## 2017-10-12 PROCEDURE — 27210437 ZZH NUTRITION PRODUCT SEMIELEM INTERMED LITER

## 2017-10-12 PROCEDURE — 25000132 ZZH RX MED GY IP 250 OP 250 PS 637: Performed by: THORACIC SURGERY (CARDIOTHORACIC VASCULAR SURGERY)

## 2017-10-12 PROCEDURE — 80048 BASIC METABOLIC PNL TOTAL CA: CPT | Performed by: ORTHOPAEDIC SURGERY

## 2017-10-12 PROCEDURE — 82803 BLOOD GASES ANY COMBINATION: CPT | Performed by: ORTHOPAEDIC SURGERY

## 2017-10-12 PROCEDURE — 83930 ASSAY OF BLOOD OSMOLALITY: CPT | Performed by: ORTHOPAEDIC SURGERY

## 2017-10-12 PROCEDURE — 25000128 H RX IP 250 OP 636: Performed by: STUDENT IN AN ORGANIZED HEALTH CARE EDUCATION/TRAINING PROGRAM

## 2017-10-12 PROCEDURE — 25000125 ZZHC RX 250: Performed by: SURGERY

## 2017-10-12 PROCEDURE — 84295 ASSAY OF SERUM SODIUM: CPT | Performed by: ORTHOPAEDIC SURGERY

## 2017-10-12 PROCEDURE — 93005 ELECTROCARDIOGRAM TRACING: CPT

## 2017-10-12 PROCEDURE — 84100 ASSAY OF PHOSPHORUS: CPT | Performed by: ORTHOPAEDIC SURGERY

## 2017-10-12 PROCEDURE — 71010 XR CHEST PORT 1 VW: CPT

## 2017-10-12 PROCEDURE — 99291 CRITICAL CARE FIRST HOUR: CPT | Mod: GC | Performed by: INTERNAL MEDICINE

## 2017-10-12 PROCEDURE — 82435 ASSAY OF BLOOD CHLORIDE: CPT | Performed by: ORTHOPAEDIC SURGERY

## 2017-10-12 PROCEDURE — 80048 BASIC METABOLIC PNL TOTAL CA: CPT | Performed by: STUDENT IN AN ORGANIZED HEALTH CARE EDUCATION/TRAINING PROGRAM

## 2017-10-12 PROCEDURE — 94003 VENT MGMT INPAT SUBQ DAY: CPT

## 2017-10-12 PROCEDURE — 25000128 H RX IP 250 OP 636: Performed by: SURGERY

## 2017-10-12 PROCEDURE — S0032 INJECTION, NAFCILLIN SODIUM: HCPCS | Performed by: THORACIC SURGERY (CARDIOTHORACIC VASCULAR SURGERY)

## 2017-10-12 PROCEDURE — 80170 ASSAY OF GENTAMICIN: CPT | Performed by: THORACIC SURGERY (CARDIOTHORACIC VASCULAR SURGERY)

## 2017-10-12 PROCEDURE — 40000275 ZZH STATISTIC RCP TIME EA 10 MIN

## 2017-10-12 PROCEDURE — 70450 CT HEAD/BRAIN W/O DYE: CPT

## 2017-10-12 PROCEDURE — 25000128 H RX IP 250 OP 636: Performed by: THORACIC SURGERY (CARDIOTHORACIC VASCULAR SURGERY)

## 2017-10-12 PROCEDURE — 40000281 ZZH STATISTIC TRANSPORT TIME EA 15 MIN

## 2017-10-12 PROCEDURE — 84295 ASSAY OF SERUM SODIUM: CPT | Performed by: STUDENT IN AN ORGANIZED HEALTH CARE EDUCATION/TRAINING PROGRAM

## 2017-10-12 PROCEDURE — 25000125 ZZHC RX 250: Performed by: THORACIC SURGERY (CARDIOTHORACIC VASCULAR SURGERY)

## 2017-10-12 PROCEDURE — 85027 COMPLETE CBC AUTOMATED: CPT | Performed by: ORTHOPAEDIC SURGERY

## 2017-10-12 PROCEDURE — 93010 ELECTROCARDIOGRAM REPORT: CPT | Performed by: INTERNAL MEDICINE

## 2017-10-12 PROCEDURE — 87040 BLOOD CULTURE FOR BACTERIA: CPT | Performed by: STUDENT IN AN ORGANIZED HEALTH CARE EDUCATION/TRAINING PROGRAM

## 2017-10-12 PROCEDURE — 25000132 ZZH RX MED GY IP 250 OP 250 PS 637: Performed by: STUDENT IN AN ORGANIZED HEALTH CARE EDUCATION/TRAINING PROGRAM

## 2017-10-12 PROCEDURE — 40000014 ZZH STATISTIC ARTERIAL MONITORING DAILY

## 2017-10-12 PROCEDURE — 20000004 ZZH R&B ICU UMMC

## 2017-10-12 PROCEDURE — S0032 INJECTION, NAFCILLIN SODIUM: HCPCS | Performed by: SURGERY

## 2017-10-12 RX ORDER — NAFCILLIN SODIUM 2 G/8ML
2 INJECTION, POWDER, FOR SOLUTION INTRAMUSCULAR; INTRAVENOUS EVERY 4 HOURS
Status: DISCONTINUED | OUTPATIENT
Start: 2017-10-12 | End: 2017-10-12 | Stop reason: ALTCHOICE

## 2017-10-12 RX ORDER — NAFCILLIN 2 G/100ML
2 INJECTION, SOLUTION INTRAVENOUS EVERY 4 HOURS
Status: DISCONTINUED | OUTPATIENT
Start: 2017-10-12 | End: 2017-10-12

## 2017-10-12 RX ORDER — NAFCILLIN SODIUM 2 G/8ML
2 INJECTION, POWDER, FOR SOLUTION INTRAMUSCULAR; INTRAVENOUS EVERY 4 HOURS
Status: DISCONTINUED | OUTPATIENT
Start: 2017-10-12 | End: 2017-10-12

## 2017-10-12 RX ORDER — GENTAMICIN SULFATE 80 MG/100ML
80 INJECTION, SOLUTION INTRAVENOUS ONCE
Status: COMPLETED | OUTPATIENT
Start: 2017-10-13 | End: 2017-10-13

## 2017-10-12 RX ORDER — NAFCILLIN SODIUM 2 G/8ML
2 INJECTION, POWDER, FOR SOLUTION INTRAMUSCULAR; INTRAVENOUS EVERY 4 HOURS
Status: COMPLETED | OUTPATIENT
Start: 2017-10-12 | End: 2017-10-19

## 2017-10-12 RX ORDER — ASPIRIN 81 MG/1
81 TABLET, CHEWABLE ORAL DAILY
Status: DISCONTINUED | OUTPATIENT
Start: 2017-10-12 | End: 2017-11-04 | Stop reason: HOSPADM

## 2017-10-12 RX ADMIN — ACETAMINOPHEN 650 MG: 325 SOLUTION ORAL at 13:28

## 2017-10-12 RX ADMIN — MULTIVITAMIN 15 ML: LIQUID ORAL at 07:58

## 2017-10-12 RX ADMIN — FENTANYL CITRATE 200 MCG/HR: 50 INJECTION, SOLUTION INTRAMUSCULAR; INTRAVENOUS at 10:58

## 2017-10-12 RX ADMIN — FENTANYL CITRATE 200 MCG/HR: 50 INJECTION, SOLUTION INTRAMUSCULAR; INTRAVENOUS at 04:55

## 2017-10-12 RX ADMIN — NAFCILLIN SODIUM 2 G: 2 INJECTION, POWDER, LYOPHILIZED, FOR SOLUTION INTRAMUSCULAR; INTRAVENOUS at 17:53

## 2017-10-12 RX ADMIN — PROPOFOL 30 MCG/KG/MIN: 10 INJECTION, EMULSION INTRAVENOUS at 04:56

## 2017-10-12 RX ADMIN — FENTANYL CITRATE 200 MCG/HR: 50 INJECTION, SOLUTION INTRAMUSCULAR; INTRAVENOUS at 18:12

## 2017-10-12 RX ADMIN — NAFCILLIN SODIUM 2 G: 2 INJECTION, POWDER, LYOPHILIZED, FOR SOLUTION INTRAMUSCULAR; INTRAVENOUS at 22:15

## 2017-10-12 RX ADMIN — POLYETHYLENE GLYCOL 3350 17 G: 17 POWDER, FOR SOLUTION ORAL at 10:03

## 2017-10-12 RX ADMIN — NAFCILLIN SODIUM 2 G: 2 INJECTION, POWDER, LYOPHILIZED, FOR SOLUTION INTRAMUSCULAR; INTRAVENOUS at 06:00

## 2017-10-12 RX ADMIN — NAFCILLIN SODIUM 2 G: 2 INJECTION, POWDER, LYOPHILIZED, FOR SOLUTION INTRAMUSCULAR; INTRAVENOUS at 10:03

## 2017-10-12 RX ADMIN — PROPOFOL 30 MCG/KG/MIN: 10 INJECTION, EMULSION INTRAVENOUS at 05:30

## 2017-10-12 RX ADMIN — NAFCILLIN SODIUM 2 G: 2 INJECTION, POWDER, LYOPHILIZED, FOR SOLUTION INTRAMUSCULAR; INTRAVENOUS at 13:25

## 2017-10-12 RX ADMIN — NAFCILLIN SODIUM 2 G: 2 INJECTION, POWDER, LYOPHILIZED, FOR SOLUTION INTRAMUSCULAR; INTRAVENOUS at 02:31

## 2017-10-12 RX ADMIN — LEVOFLOXACIN 750 MG: 5 INJECTION, SOLUTION INTRAVENOUS at 10:58

## 2017-10-12 RX ADMIN — PROPOFOL 30 MCG/KG/MIN: 10 INJECTION, EMULSION INTRAVENOUS at 10:05

## 2017-10-12 RX ADMIN — PROPOFOL 40 MCG/KG/MIN: 10 INJECTION, EMULSION INTRAVENOUS at 13:25

## 2017-10-12 RX ADMIN — SENNOSIDES AND DOCUSATE SODIUM 1 TABLET: 8.6; 5 TABLET ORAL at 20:02

## 2017-10-12 RX ADMIN — ASPIRIN 81 MG CHEWABLE TABLET 81 MG: 81 TABLET CHEWABLE at 13:25

## 2017-10-12 ASSESSMENT — VISUAL ACUITY
OU: OTHER (SEE COMMENT)

## 2017-10-12 NOTE — PLAN OF CARE
Problem: Patient Care Overview  Goal: Plan of Care/Patient Progress Review  Neuro: More awake. Pupils equal and reactive. At times opening eyes. Completely out on L side. R has some purposeful movement, not to command. Sedated.   Respiratory: On CMV rate of 16, overbreathing vent. Peak pressures are 34. LS coarse/diminished. Minimal secretions.  GI/: Free water adjusted for high sodium. TF at goal of 55. Good urine output.   VSS, at times hypotensive, able to self correct. TMax 102.2, fan, tylenol and ice packs are bringing him back below 100.   Continue to monitor, notify MD with any concerns.

## 2017-10-12 NOTE — PROGRESS NOTES
NEUROSURGERY BRIEF UPDATE NOTE    Repeat head CT is stable. Exam has remained stable. Neurosurgery is signing off at this time.  We will remain immediately available if there are declines in his neuro exam or level of consciousness.      Morgan Short MD  Neurosurgery PGY2

## 2017-10-12 NOTE — PHARMACY-AMINOGLYCOSIDE DOSING SERVICE
Pharmacy Aminoglycoside Follow-Up Note  Date of Service 2017  Patient's  1955   62 year old male    Weight (Adjusted): 78.2 kg    Indication: Endocarditis  Current Gentamicin regimen:  Intermittent dosing, received 100mg IV gentamicin 10/11 at 20:15  Day of therapy: 7    Target goals based on synergy dosing  Goal Peak level: 3-5 mg/L  Goal Trough level: <1 mg/L    Current estimated CrCl: Estimated Creatinine Clearance: 24.7 mL/min (based on Cr of 3.31).    Creatinine for last 3 days  10/9/2017:  3:29 PM Creatinine 2.40 mg/dL  10/10/2017:  4:01 AM Creatinine 3.06 mg/dL  10/11/2017:  3:12 AM Creatinine 3.35 mg/dL  10/12/2017:  4:46 AM Creatinine 3.31 mg/dL    Nephrotoxins and other renal medications (Future)    Start     Dose/Rate Route Frequency Ordered Stop    10/13/17 0800  gentamicin (GARAMYCIN) infusion 80 mg      80 mg  over 60 Minutes Intravenous ONCE 10/12/17 1303      10/11/17 1311  gentamicin (GARAMYCIN) place vega - receiving intermittent dosing      1 each Does not apply SEE ADMIN INSTRUCTIONS 10/11/17 1311      10/07/17 1100  nafcillin IV 2 g vial to attach to  ml bag      2 g  over 1 Hours Intravenous EVERY 4 HOURS 10/07/17 1055            Contrast Orders - past 72 hours (72h ago through future)    Start     Dose/Rate Route Frequency Ordered Stop    10/11/17 0830  iodixanol (VISIPAQUE 320) injection 150 mL      150 mL Intravenous ONCE 10/11/17 0826 10/11/17 0941          Aminoglycoside Levels - past 2 days  10/11/2017: 10:30 AM Gentamicin Level 1.6 mg/L;  6:30 PM Gentamicin Level 1.0 mg/L; 10:21 PM Gentamicin Level 6.0 mg/L  10/12/2017: 11:29 AM Gentamicin Level 2.6 mg/L    Aminoglycosides IV Administrations (past 72 hours)                   gentamicin (GARAMYCIN) infusion 100 mg (mg) 100 mg New Bag 10/11/17 2015    gentamicin (GARAMYCIN) infusion 100 mg (mg) 100 mg New Bag 10/10/17 1006                Pharmacokinetic Analysis  Calculated Peak level: 6.4 mg/L  Calculated  Trough level: 0.7 mg/L (extrapolated at 36 hours post-dose)  Volume of distribution: 0.17 L/kg  Half-life: 11 hours    Interpretation of levels and current regimen:  As peak level is above goal range, will decrease dose slightly to 80mg (1mg/kg based on adjusted weight). Based on extrapolated trough, will continue to dose at ~36 hour intervals.    Has serum creatinine changed greater than 50% in the last 72 hours: Yes    Urine output:  good urine output    Renal function: Worsened from baseline but SCr trend improving    Plan  1.  Give one-time dose of 80mg IV gentamicin 10/13/17 at 08:00 (approximately 36 hours post-previous dose).     2.  Method of evaluation: 2 post dose levels    3. Pharmacy will continue to follow and check levels after next dose    Greta Cardozo, JavedD

## 2017-10-12 NOTE — PROGRESS NOTES
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 Following closely for large evolving cerebellar infarct.     RECOMMENDATIONS:  - Neuro checks Q2H  - Normonatremia recommended  - SBP < 140  - Daily CT head scans. Will follow-up today's CT head.  - No chemical dvt ppx  - SCDs only   - Other cares per primary team.     The patient was discussed with Dr. Leggett, neurosurgery chief resident, and he agrees with the above.    Melanie Ramos MD  Neurosurgery, PGY-3    Overnight events:  MAYELIN. Resting comfortably, intubated with light sedation.     EXAM:  /61  Pulse 82  Temp 100  F (37.8  C)  Resp 18  Ht 1.727 m (5' 8\")  Wt 91.2 kg (201 lb)  SpO2 94%  BMI 30.56 kg/m2  Neuro:   Intubated, sedated.   PERRL, tracks in all direction. Face symmetric. Gag intact. Corneal intact.   Withdraw x RUE and RLE. Moves LUE and LLE spontaneously but not to command.     LABS/IMAGING:  Lab Results   Component Value Date    WBC 11.5 10/12/2017     Lab Results   Component Value Date    RBC 4.08 10/12/2017     Lab Results   Component Value Date    HGB 12.0 10/12/2017     Lab Results   Component Value Date    HCT 37.6 10/12/2017     Lab Results   Component Value Date    MCV 92 10/12/2017     Lab Results   Component Value Date    MCH 29.4 10/12/2017     Lab Results   Component Value Date    MCHC 31.9 10/12/2017     Lab Results   Component Value Date    RDW 18.2 10/12/2017     Lab Results   Component Value Date     10/12/2017     Last Basic Metabolic Panel:  Lab Results   Component Value Date     10/12/2017     10/12/2017      Lab Results   Component Value Date    POTASSIUM 4.2 10/12/2017     Lab Results   Component Value Date    CHLORIDE 134 10/12/2017    CHLORIDE >125 " 10/12/2017     Lab Results   Component Value Date    IZABELLA 7.8 10/12/2017     Lab Results   Component Value Date    CO2 24 10/12/2017     Lab Results   Component Value Date    BUN 74 10/12/2017     Lab Results   Component Value Date    CR 3.31 10/12/2017     Lab Results   Component Value Date     10/12/2017         XR Chest Port 1 View   Final Result   Impression:     1. Endotracheal tube now projects approximately 4 cm above the bart.   Otherwise stable support devices.   2. Slightly increased small left pleural effusion and adjacent basilar   atelectasis and/or consolidation.   3. Stable cardiomegaly and mild pulmonary vascular congestion.       I have personally reviewed the examination and initial interpretation   and I agree with the findings.      FOREST AZEVEDO MD      IR Carotid Cerebral Angiogram Bilateral   Preliminary Result   Impression:    1. No evidence of mycotic aneurysm.   2. Absence of capillary phase in the area of left middle cerebral   artery and left posterior inferior cerebellar artery corresponds to   recently seen ischemic strokes.      Bernabe Galeano MD   Endovascular Surgical Neuroradiology Fellow   Pager: [phone]      XR Chest Port 1 View   Final Result   Impression:    1. Interval resolution of left pleural effusion.   2. Stable cardiomegaly and mild pulmonary vascular congestion.   3. Support devices as above.      I have personally reviewed the examination and initial interpretation   and I agree with the findings.      KENNY ELIZABETH      CT Head w/o Contrast   Final Result   Impression:  Continued expected edematous evolution of the infarctions   involving multiple vascular territories in the left cerebellar and   cerebral hemispheres.      I have personally reviewed the examination and initial interpretation   and I agree with the findings.      CARMEN ARROYO MD      CT Head w/o Contrast   Final Result   Impression: Continued expected edematous changes of the  infarctions   involving multiple vascular territories in the left cerebral and   cerebellar hemispheres. No hemorrhagic transformation. Given the mass   effect on the fourth ventricle, continued monitoring for development   of hydrocephalus would likely be of benefit.      I have personally reviewed the examination and initial interpretation   and I agree with the findings.      CARMEN ARROYO MD      XR Abdomen Port 1 View   Final Result   IMPRESSION:    1. Feeding tube tip now projects over the distal duodenum.   2. Nonobstructive bowel gas pattern.      I have personally reviewed the examination and initial interpretation   and I agree with the findings.      BONNIE ROWLAND MD      CT Head w/o Contrast   Final Result   Impression:    1. No significant change in appearance of evolving infarctions   involving multiple vascular territories compared to 10/8/2017 with   stable adjacent sulcal effacement and slightly decreased mass effect   on the fourth ventricle. No evidence of hemorrhagic transformation or   herniation. Unchanged ventricular sizes.   2. Stable chronic encephalomalacia in the posterior left occipital   lobe.      I have personally reviewed the examination and initial interpretation   and I agree with the findings.      CARMEN ARROYO MD      XR Abdomen Port 1 View   Final Result   Impression:     1. Enteric tube tip projects near the gastric antrum/duodenal bulb.   Gastric tube tip and sidehole project over the stomach.   2. Nonspecific paucity of bowel gas in the abdomen.      I have personally reviewed the examination and initial interpretation   and I agree with the findings.      ROBERT JARA MD      CT Head w/o Contrast   Final Result   Impression:    1. No significant change since 10/7/2017. Stable late acute infarcts   involving multiple vascular territories. Stable localized mass effect.   No hemorrhagic transformation, midline shift, herniation or   hydrocephalus.   2. Stable chronic  left posterior cerebral artery and posterior left   cerebral hemispheric border zone territory infarct.   3. Stable moderate chronic small vessel ischemic disease.      YESI GOMEZ MD      CT Head w/o Contrast   Final Result   Impression:    1. Expected interval evolution of supratentorial and infratentorial   infarcts.   2. No acute intracranial hemorrhage.   3. Mass effect with partial effacement of the fourth ventricle. No   hydrocephalus.      I have personally reviewed the examination and initial interpretation   and I agree with the findings.      NELSY LICONA MD      XR Abdomen Port 1 View   Final Result   Impression: OG tube tip and sidehole project over the stomach.      KENNY CLARA      MR Brain for Stroke [facility] w/o & w Contras   Final Result   Abnormal   Impression:   1. Bilateral regions of acute infarction involving multiple vascular   territories, suggestive of embolic phenomenon.   2. Chronic left posterior cerebral artery territory and posterior left   hemispheric border zone infarcts.   3. Moderate chronic small vessel ischemic disease.   4. Widely patent major intracranial and cervical arteries. No stenosis   or aneurysm.      [Urgent Result: Acute infarction]      Finding was identified on 10/6/2017 11:15 PM.       Dr. Pinedo was contacted by Dr. Ramesh Aguila at 10/6/2017 11:15   PM and verbalized understanding of the urgent finding.       I have personally reviewed the examination and initial interpretation   and I agree with the findings.      YESI GOMEZ MD      XR Chest Port 1 View   Final Result   Impression:     1. Slightly increased small left pleural effusion and adjacent basilar   atelectasis and/or consolidation.   2. Stable cardiomegaly and mild pulmonary vascular congestion.   3. Stable support devices.       I have personally reviewed the examination and initial interpretation   and I agree with the findings.      KENNY CLARA      XR Knee Port Left 1/2 Views    Final Result   IMPRESSION:    1. No acute osseous adenopathy.   2. Mild degenerative changes of the left knee, most pronounced in the   medial compartment.      I have personally reviewed the examination and initial interpretation   and I agree with the findings.      BONNIE ROWLAND MD      XR Chest Port 1 View   Final Result   IMPRESSION:    1. Cardiomegaly with pulmonary vascular congestion.   2. Unchanged widening of the mediastinum, which corresponds with the   mediastinal hematoma or periaortic abscess seen on CT 10/6/2017.      I have personally reviewed the examination and initial interpretation   and I agree with the findings.      SAMANTHA CHRISTOPHER MD      XR Chest Port 1 View    (Results Pending)         I have reviewed the history.I have seen and examined the patient myself and agree with the assessment and plan above. Has mild mass effect from left PICA stroke but do not anticipate surgical decompression.  PINO Miller MD

## 2017-10-12 NOTE — PROGRESS NOTES
CV Surgery  Progress Note:    S/Interval History:  Overnight no events.     O:  Temp: 100  F (37.8  C) Temp  Min: 98.1  F (36.7  C)  Max: 100.2  F (37.9  C)  Resp: 18 Resp  Min: 16  Max: 18  SpO2: 94 % SpO2  Min: 93 %  Max: 98 %    No Data Recorded  Heart Rate: 75 Heart Rate  Min: 62  Max: 80    No data recorded.  No data recorded.    Ventilation Mode: CMV/AC  FiO2 (%): 50 %  Rate Set (breaths/minute): 16 breaths/min  Tidal Volume Set (mL): 500 mL  PEEP (cm H2O): 5 cmH2O  Oxygen Concentration (%): 50 %  Resp: 18       Physical Exam    General: Intubated, mechanically ventilated, non responsive   Neck: Supple, no tracheal deviation  Cardiovascular: RRR  Pumonary: Non labored breathing on MV.  CTAB   Abdomen: Soft, non distended, non tender.   Ext: No peripheral edema, appropriate distal perfusion   Neuro: Not following commands      Lab Results   Component Value Date    WBC 11.5 (H) 10/12/2017    HGB 12.0 (L) 10/12/2017    HCT 37.6 (L) 10/12/2017     10/12/2017     (HH) 10/12/2017     (HH) 10/12/2017    POTASSIUM 4.2 10/12/2017    CHLORIDE 134 (H) 10/12/2017    CHLORIDE >125 (H) 10/12/2017    CO2 24 10/12/2017    BUN 74 (H) 10/12/2017    CR 3.31 (H) 10/12/2017     (H) 10/12/2017    SED 6 05/06/2013    DD 1.7 (H) 04/25/2016    NTBNPI 2768 (H) 04/25/2016    NTBNP 1302 (H) 06/03/2016    TROPONIN <0.07 12/05/2005    TROPI 0.634 (HH) 10/08/2017    AST 24 10/06/2017    ALT 33 10/06/2017    ALKPHOS 85 10/06/2017    BILITOTAL 1.5 (H) 10/06/2017    INR 1.03 10/08/2017         Recent Labs  Lab 10/12/17  0446 10/11/17  0312 10/10/17  0401 10/09/17  1056   PH 7.36 7.38 7.36 7.39   PCO2 43 39 46* 44   PO2 81 81 94 139*   HCO3 24 23 26 26   O2PER 50.0 40 40.0 50.0       A/P:  62 year old with history of AVR and ascending aortic repair in May 2016 presented with acute mental status changes found to have large moises-aortic fluid collection and multiple strokes likely septic emboli.      Neuro: Propofol and  fentanyl gtt. 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  Tylenol for fevers, keep normothermic. Allow sodium to drift down. Appreciate neurology consult. Cerebral angiography yesterday normal. Minimal sedation for neuro examination. Daily CT head scans. Head CT stable thus far. Neuro checks Q2. Will discuss daily sedation holidays with neuro.   Resp: minimal vent settings, Continue CMV at current settings.  CV:  OWEN revealed vegetations on ventricular leaflet of aortic valve suggestive of endocarditis. Neuro okay with ASA starting today. 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 Monitor daily EKG. Goal SBP < 140 mmHg, nicardipine added   GI/FEN: Post pyloric feeding tube in place, keep infusing at goal rate.     Renal: Good urine output. Continue to monitor. Cr increased but stable (3.3).  Endo: Insulin gtt   ID:  Nafcillin 2g IV Q4 hours and Levofloxacin 750mg IV Q48 hours. Gentamicin IV daily (x14 days to 10/19/17). Daily blood cultures; need to assess for clearance.  Heme: No bleeding concerns at this time  Prophylaxis: H2 blocker, hold heparin per neuro recs  Lines: arterial line, central line, PIVs. Consider changing when cultures clear.  Dispo: CVICU        Patient discussed with staff.    ____     Rula Contreras  General Surgery PGY4  Cardiothoracic Surgery  [id number]

## 2017-10-12 NOTE — PROGRESS NOTES
Neuroscience Intensive Care Progress Note    10/11/2017    24 hour events:  Stable Exam. Na levels trending up. Stable repeat CT - No hydrocephalous.     24 Hour Vital Signs Summary:  Temperatures:  Current - Temp: 98.8  F (37.1  C); Max - Temp  Av.2  F (37.3  C)  Min: 98.1  F (36.7  C)  Max: 102  F (38.9  C)  Respiration range: Resp  Av.8  Min: 16  Max: 20  Pulse range: No Data Recorded  Blood pressure range: Systolic (24hrs), Av , Min:95 , Max:194   ; Diastolic (24hrs), Av, Min:53, Max:101    Pulse oximetry range: SpO2  Av.4 %  Min: 93 %  Max: 97 %    Ventilator Settings  Ventilation Mode: CMV/AC  FiO2 (%): 40 %  Rate Set (breaths/minute): 16 breaths/min  Tidal Volume Set (mL): 500 mL  PEEP (cm H2O): 5 cmH2O  Oxygen Concentration (%): 40 %  Resp: 18      Intake/Output Summary (Last 24 hours) at 10/11/17 1904  Last data filed at 10/11/17 1800   Gross per 24 hour   Intake          2349.75 ml   Output             2955 ml   Net          -605.25 ml       Arterial Line BP: (103-174)/(57-85) 126/69  MAP:  [74 mmHg-119 mmHg] 89 mmHg  BP - Mean:  [119] 119    Current Medications:    gentamicin (GARAMYCIN) place vega - receiving intermittent dosing  1 each Does not apply See Admin Instructions     gentamicin  100 mg Intravenous Once     polyethylene glycol  17 g Oral Daily     levofloxacin  750 mg Intravenous Q48H     famotidine  20 mg Intravenous Q24H     [START ON 10/15/2017] influenza quadrivalent (PF) vacc age 3 yrs and older  0.5 mL Intramuscular Prior to discharge     [START ON 10/15/2017] pneumococcal vaccine  0.5 mL Intramuscular Prior to discharge     sodium chloride (PF)  3 mL Intravenous Q8H     multivitamins with minerals  15 mL Per Feeding Tube Daily     nafcillin  2 g Intravenous Q4H     senna-docusate  1-2 tablet Oral or Feeding Tube BID     sodium chloride (PF)  3 mL Intracatheter Q8H       PRN Medications:  fentaNYL, naloxone, midazolam, flumazenil, lidocaine BUFFERED 1 %, HOLD  "MEDICATION, ondansetron **OR** ondansetron, hydrALAZINE, sodium chloride (PF), - MEDICATION INSTRUCTIONS -, - MEDICATION INSTRUCTIONS -, acetaminophen, prochlorperazine **OR** prochlorperazine, naloxone, IV fluid REPLACEMENT ONLY, glucose **OR** dextrose **OR** glucagon, lidocaine (buffered or not buffered), lidocaine 4%, sodium chloride (PF), Reason beta blocker order not selected, albumin human, insulin aspart, meperidine, albuterol, albuterol, fentaNYL, potassium chloride, potassium chloride, potassium chloride, potassium chloride with lidocaine, potassium chloride, magnesium sulfate, magnesium sulfate, potassium phosphate (KPHOS) in D5W IV, potassium phosphate (KPHOS) in D5W IV, potassium phosphate (KPHOS) in D5W IV, potassium phosphate (KPHOS) in D5W IV, potassium phosphate (KPHOS) in D5W IV, diphenhydrAMINE **OR** diphenhydrAMINE    Infusions:    niCARdipine (CARDENE) infusion ADULT/PEDS GREATER than or EQUAL to 45 kg max conc Stopped (10/11/17 1014)     NaCl 75 mL/hr at 10/11/17 1323     fentaNYL 200 mcg/hr (10/11/17 1400)     - MEDICATION INSTRUCTIONS -       - MEDICATION INSTRUCTIONS -       propofol (DIPRIVAN) infusion 30 mcg/kg/min (10/11/17 1822)     IV fluid REPLACEMENT ONLY       insulin (regular) Stopped (10/08/17 1821)     Reason beta blocker order not selected         Allergies   Allergen Reactions     Lisinopril Swelling     One-sided facial swelling after being on lisinopril/HCTZ for one week.        Physical Examination:  /61  Pulse 82  Temp 98.8  F (37.1  C)  Resp 18  Ht 1.727 m (5' 8\")  Wt 91.2 kg (201 lb)  SpO2 93%  BMI 30.56 kg/m2   Lethargic - responds to painful stimuli. No gaze abnormality noted. PERRLA. Occulo cephalic intact. Moves left UE and LE with minimally painful stimuli. No movement on RUE noted. Minimal withdrawal on RLE.     Labs/Studies:  Recent Labs   Lab Test  10/11/17   1558  10/11/17   1030  10/11/17   0558  10/11/17   0312   10/10/17   0401  10/09/17   2158  " 10/09/17   1529  10/09/17   0316   NA  165*  166*  161*  162*   < >  151*  146*  146*  146*   POTASSIUM   --    --    --   3.8   --   4.4   --   4.3  3.4   CHLORIDE   --    --    --   130*   --   118*   --   113*  112*   CO2   --    --    --   24   --   25   --   25  26   ANIONGAP   --    --    --   8   --   8   --   9  9   GLC   --    --    --   201*   --   162*   --   154*  140*   BUN   --    --    --   60*   --   44*   --   32*  21   CR   --    --    --   3.35*   --   3.06*   --   2.40*  1.46*   IZABELLA   --    --    --   8.1*   --   7.5*   --   8.6  8.2*   WBC   --    --    --   13.4*   --   10.9   --    --   3.9*   RBC   --    --    --   4.45   --   3.71*   --    --   4.58   HGB   --    --    --   13.4   --   11.0*   --    --   13.7   PLT   --    --    --   238   --   155  146*   --   125*    < > = values in this interval not displayed.       Recent Labs   Lab Test  10/08/17   0328  10/06/17   1322  10/06/17   1020  05/18/16   0435   INR  1.03  1.25*  1.23*  1.28*   PTT   --   32  32  35           Recent Labs  Lab 10/11/17  0312 10/10/17  0401 10/09/17  1056 10/09/17  0551   PH 7.38 7.36 7.39 7.42   PCO2 39 46* 44 41   PO2 81 94 139* 113*   HCO3 23 26 26 27   O2PER 40 40.0 50.0 60           Imaging:  Reviewed     Assessment/Plan      - Stable exam  - Repeat CT tstable for evaluation of edema related to right PICA infarct.  - ASA 81 mg   - minimal sedation to assess neurologic exam  - hypertonic saline off, Na 166 - can allow to slowly drift down  - Cerebral Angiogram normal  - will continue to follow      Plan discussed with Dr. Miller.      Siddhartha Dyson  Fellow

## 2017-10-12 NOTE — PROGRESS NOTES
Care Coordinator Progress Note     Admission Date/Time:  10/6/2017  Attending MD:  Ramesh Kuo, *     Data  Chart reviewed, discussed with interdisciplinary team.   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    Assessment  Pt remain in ICU vented and sedated.  Care conf. was held yesterday afternoon with CVTS, CVICU, Neuro ICU, SW, CC, [facility] and so.  The team discussed in detail about pt hospital course, current medical status and the plan for the next few weeks.   The team stated the plans going forward are:  continue to treat the infection with abx, to do CT of the kidney in the next few day, will do white blood cell scan prior taking him to OR to make sure it is infection that we are seeing, and plan to take him to OR in two weeks ( the week of October 23rd), if he remains stable.  The team informed family that the plan can change any time if pt medical condition changes.  Family agreed with the above plans.  The team addressed all pt family questions.     Plan  Anticipated Discharge Date:  TBD.  Anticipated Discharge Plan:   TBD.  CC will cont to follow plan of care.      Matthew Keller, RN, PHN, BSN  4A and 4E/ ICU  Care Coordinator  Phone: [phone]  Pager: [phone]

## 2017-10-12 NOTE — PROGRESS NOTES
Orthopaedic Surgery Progress Note 10/12/2017    E: No acute events overnight.    S: Remains intubated and sedated. Per nursing and family, moving LLE spontaneously and opening eyes. Continued surveillance of neuro status.    O:  Temp: 100.6  F (38.1  C) Temp src: Bladder BP: 144/88   Heart Rate: 84 Resp: 20 SpO2: 97 % O2 Device: Mechanical Ventilator      Exam:  Gen: No acute distress, intubated and sedated.  Resp: Intubated  LLE: Knee portal incisions clean and dry, no surrounding erythema or drainage. No appreciable knee effusion, euthermic compared to R knee. No grimacing or signs of pain with PROM from 0-90.       Recent Labs  Lab 10/12/17  0446 10/11/17  0312 10/10/17  0401  10/09/17  0316  10/07/17  1333  10/06/17  1859   WBC 11.5* 13.4* 10.9  --  3.9*  < >  --   < >  --    HGB 12.0* 13.4 11.0*  --  13.7  < >  --   < >  --     238 155  < > 125*  < >  --   < >  --    CRP  --   --   --   --  240.0*  --  290.0*  --  310.0*   < > = values in this interval not displayed.    Culture results: NGTD from intra-op cultures taken 10/8    Assessment: Cricket Miguel is a 62 year old male s/p Left knee I&D on 10/8 with Dr. Munoz. Continued reassuring and benign LLE exam, no concerns over incision.      Plan:  Dressing change completed, incision c/d/i without drainage.   May change dressing PRN for saturation, keep covered otherwise. May remove stitches at 2 weeks post-op (10/22)  WBAT LLE  Diet per primary, no anticipated RTOR for L knee  DVT prophylaxis per primary  No bracing needed  Will follow cultures - NGTD x4 days      Adiel Hernadez MD 10/12/2017  Orthopaedic Surgery Resident, PGY-1  Pager: [phone]    For questions about this patient, please attempt to contact me at my pager prior to contacting the orthopaedics resident on call. Thank you!

## 2017-10-12 NOTE — PROGRESS NOTES
[facility] ID SERVICE: ONGOING CONSULTATION   Cricket Miguel : 1955 Sex: male:   Medical record number [id number] Attending Physician: Ramesh Kuo  Date of Service: 2017    PROBLEM LIST: (New and established)  1. 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 Left knee pain; concern for septic arthritis; recent steroid injection; cultures show MSSA  3. Imaging c/w right pyelonephritis  4. Multifocal acute infarctions involving the left parietotemporal lobes, left cerebellar hemisphere, and right occipital lobe  5. Ongoing critical illness with mechanical ventilation     RECOMMENDATIONS:   1. Continue Nafcillin 2g IV Q4 hours and Levofloxacin 750mg IV Q48 hours (better CNS penetration)  2. Continue Gentamicin for synergy; pharmacy to assist in dosing and tracking levels; treat for 14 days total (through 10/20/17); if surgery is planned around this time, could extend treatment through the surgery  3. Daily blood cultures to demonstrate clearance  4. Monitor renal function closely given use of gentamicin  5. Once the bacterial burden has been knocked down by evacuation of the periaortic abscess, will consider adding Rifampin to enhance biofilm-associated MSSA     DISCUSSION:   62 year old male with a history of severe severe aortic valve insufficiency and a 5 cm aortic root aneurysm and severe single vessel coronary artery disease involving an obtuse marginal branch, s/p Bentall procedure (aortic valve replacement and aortic root replacement with a composite graft constructed using a 27 mm St. Isidro Trifecta (bovine pericardial) valve and a 30 mm Mckenzie Cardioroot Valsalva tube graft), endoscopic vein harvest from the left lower extremity (reversed saphenous vein graft to OM) on 16, here for an evaluation of a fall and altered mental status, and evidence of sepsis.      Given the findings of a fluid  collection around the aortic root and ascending thoracic aorta in the setting of a prosthetic heart valve, concern for infective endocarditis is high. Late onset prosthetic valve endocarditis typically involves Staphylococcus aureus, Staphylococcus epidermidis, Viridans streptococcus, and Enterococcal spp. Appropriate empiric treatment for prosthetic valve endocarditis involves vancomycin + gentamicin; rifampin may also be used when the organism is identified and proven susceptible and when the bacterial burden has been reduced (low threshold for resistance development with high bacterial load). Blood cultures are growing MSSA. He was then switched to nafcillin 2g IV Q4 hours. Areas of concern for the source of IE include the left knee (especially given his recent steroid injection) and urinary/renal due to imaging suggestive of possible right pyelonephritis, although these could be secondary sites as well. 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 To improve CNS coverage for MSSA, levofloxacin was added. Possible CV surgery next week. Overall prognosis is guarded.     PINO Pereira M.D.  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 Cerebral angiogram normal. Possible CV surgery sometime next week. Poor renal function; likely from embolic phenomenon and gentamicin. Peristent positive blood cultures not fully unexpected. Discuss overall antibiotic treatment plan with family in detail.  ROS: (Recommend ? 2systems)   A five-point review of systems was not obtained due to intubation and AMS  Allergies   Allergen Reactions     Lisinopril Swelling     One-sided facial swelling after being on lisinopril/HCTZ for one week.      Allergies were reviewed.  No current outpatient prescriptions  "on file.       CURRENT ANTI-INFECTIVES:   Nafcillin (10/7-), Gentamicin (10/6-), Levofloxacin (10/7-)  EXAMINATION: (Recommend at least 12 bullets from any organ systems)   Vital Signs: /61  Pulse 82  Temp 100.2  F (37.9  C)  Resp 16  Ht 1.727 m (5' 8\")  Wt 91.2 kg (201 lb)  SpO2 95%  BMI 30.56 kg/m2   GENERAL:  well-developed, well-nourished, in bed in no acute distress, unconscious, intubated   EYES:  Eyes have anicteric sclerae without conjunctival injection   ENT:  Atraumatic. oral intubation  NECK:  Supple. No cervical lymphadenopathy; right IJ C/D/I  LUNGS:  Clear to auscultation bilateral. Respiratory effort is normal  CARDIOVASCULAR:  Regular rate and rhythm with click noted  ABDOMEN:  Normal bowel sounds, soft, nontender. No appreciable hepatosplenomegaly  SKIN:  No acute rashes; lower extremities have a mottled appearance.  Line(s) are in place without any surrounding erythema or exudate. Left knee wrapped.  NEUROLOGIC:  Unarousable; no movement; pinpoint pupils  PSYCH: Unable to assess  CURRENT LINES: Right IJ, PICC  NEW DATA/RESULTS:   All interval basic labs, microbiology results and imaging were personally reviewed.  Reviewed medicine test (PFTs, EKG, cardiac echo or cath): NO    Culture Micro   Date Value Ref Range Status   10/12/2017 No growth after 1 hour  Preliminary   10/11/2017 (A)  Preliminary    Cultured on the 1st day of incubation:  Staphylococcus aureus     10/11/2017   Preliminary    Critical Value/Significant Value, preliminary result only, called to and read back by  Javier Nesbitt RN [id number] 10/12/17. MS     10/11/2017 Susceptibility testing done on previous specimen  Preliminary   10/10/2017 (A)  Final    Cultured on the 1st day of incubation:  Staphylococcus aureus     10/10/2017   Final    Critical Value/Significant Value, preliminary result only, called to and read back by  Esther Abdullahi RN, @7235 10/10/17.     10/10/2017   Final    (Note)  POSITIVE for STAPHYLOCOCCUS AUREUS " and NEGATIVE for the mecA gene  (not MRSA) by Verigene multiplex nucleic acid test. The mecA gene was  not detected. Final identification and antimicrobial susceptibility  testing will be verified by standard methods.    Specimen tested with Verigene multiplex, gram-positive blood culture  nucleic acid test for the following targets: Staph aureus, Staph  epidermidis, Staph lugdunensis, other Staph species, Enterococcus  faecalis, Enterococcus faecium, Streptococcus species, S. agalactiae,  S. anginosus grp., S. pneumoniae, S. pyogenes, Listeria sp., mecA  (methicillin resistance) and Yvan/B (vancomycin resistance).    Critical Value/Significant Value called to and read back by Esther Abdullahi RN at 0132 [id number]         Recent Labs   Lab Test  10/09/17   0316  10/07/17   1333  10/06/17   1859  05/06/13   1703  02/02/11   1506   CRP  240.0*  290.0*  310.0*  <5.0  9.6*     Recent Labs   Lab Test  10/12/17   0446  10/11/17   0312  10/10/17   0401  10/09/17   0316  10/08/17   0328  10/07/17   0422   WBC  11.5*  13.4*  10.9  3.9*  11.8*  11.0     Recent Labs   Lab Test  10/12/17   0446  10/11/17   0312  10/10/17   0401  10/09/17   1529   CR  3.31*  3.35*  3.06*  2.40*   GFRESTIMATED  19*  19*  21*  27*       Hematology Studies  Recent Labs   Lab Test  10/12/17   0446  10/11/17   0312  10/10/17   0401  10/09/17   2158  10/09/17   0316  10/08/17   0328  10/07/17   0422   10/06/17   1020   05/06/13   1703  12/04/12   1016  02/02/11   1506   11/06/09   1155  11/02/09   1717   WBC  11.5*  13.4*  10.9   --   3.9*  11.8*  11.0   < >  17.1*   < >  10.6  11.9*  11.9*   < >  9.2  12.1*   ANEU   --    --    --    --    --    --    --    --   15.7*   --   7.2  7.9  8.2   --   5.9  8.3   AEOS   --    --    --    --    --    --    --    --   0.0   --   0.0  0.4  0.3   --   0.3  0.3   HCT  37.6*  40.8  33.9*   --   42.2  39.9*  38.8*   < >  43.8   < >  46.2  47.4  46.8   < >  47.1  45.9   PLT  266  238  155  146*  125*  138*  132*   <  >  185   < >  315  259  315   < >  303  312    < > = values in this interval not displayed.       Metabolic  Recent Labs   Lab Test  10/12/17   0446  10/11/17   2221  10/11/17   1558   10/11/17   0312   10/10/17   0401   NA  167*  167*  166*  165*   < >  162*   < >  151*   BUN  74*   --    --    --   60*   --   44*   CO2  24   --    --    --   24   --   25   CR  3.31*   --    --    --   3.35*   --   3.06*   GFRESTIMATED  19*   --    --    --   19*   --   21*    < > = values in this interval not displayed.       Hepatic Studies  Recent Labs   Lab Test  10/06/17   1020  06/03/16   1716  04/26/16   0654   BILITOTAL  1.5*  0.3  0.6   ALKPHOS  85  104  91   ALBUMIN  2.8*  3.4  3.1*   AST  24  19  20   ALT  33  32  26       Immunologlobulins  Recent Labs   Lab Test  05/06/13   1703  02/02/11   1506   SED  6  8       IMAGING RESULTS  The following imaging studies were independently reviewed:    10/12/17 CT Head:  Impression: Unchanged multiple vascular territory infarcts, involving  the left temporoparietal and bilateral cerebellar hemispheres, left  greater than right. Stable left inferior occipital encephalomalacia.  No intracranial hemorrhage or midline shift.  10/12/17 CXR  IMPRESSION:   1. Stable support devices.  2. Unchanged small bilateral pleural effusions.  3. Unchanged left basilar/retrocardiac atelectasis versus  consolidation.

## 2017-10-12 NOTE — PROGRESS NOTES
Social Work Services Progress Note    Hospital Day: 7  Date of Initial Social Work Evaluation:  10/12/17  Collaborated with:  Pt's dtr, team rounds, chart review    Data:    Care conference held yesterday.  Plan for CT next week to determine infection with likely surgery in two weeks.    Intervention:    Received voice message from pt's dtr Elma requesting letter for airline for Meghna's trip to [place] that she is cancelling.  Drafted letter.  Briefly met with pt's dtr and provided her with the letter.    Assessment:   Family continues to adjust appropriately.    Plan:    Anticipated Disposition:  TBD pending medical course.    Barriers to d/c plan:  Pt will need CT next week and likely surgery in two weeks.  He is not medically stable and will need to remain in the hospital, and likely ICU, for the duration of that time.    Follow Up:  SW continues to follow for support to pt and family, resource referral, and d/c planning.    KAMAR Mason, [facility]  Adult ICU Clinical   Pager [phone]

## 2017-10-12 NOTE — PROGRESS NOTES
CV ICU   Progress Note:    S/Interval History: Overnight no events.      O:  Temp: 100.6  F (38.1  C) Temp  Min: 98.8  F (37.1  C)  Max: 101.7  F (38.7  C)  Resp: 20 Resp  Min: 16  Max: 20  SpO2: 97 % SpO2  Min: 93 %  Max: 98 %    No Data Recorded  Heart Rate: 84 Heart Rate  Min: 68  Max: 91  BP: 144/88 Systolic (24hrs), Av , Min:113 , Max:144   Diastolic (24hrs), Av, Min:72, Max:88      Ventilation Mode: CMV/AC  FiO2 (%): 50 %  Rate Set (breaths/minute): 16 breaths/min  Tidal Volume Set (mL): 500 mL  PEEP (cm H2O): 5 cmH2O  Oxygen Concentration (%): 50 %  Resp: 20     Date 10/12/17 0700 - 10/13/17 0659   Shift 1508-6970 0651-4843 7032-2079 24 Hour Total   I  N  T  A  K  E   I.V. 591.67   591.67    NG/ 230  670    Enteral 440 110  550    Shift Total  (mL/kg) 1471.67  (16.14) 340  (3.73)  1811.67  (19.87)   O  U  T  P  U  T   Urine 925 300  1225    Shift Total  (mL/kg) 925  (10.15) 300  (3.29)  1225  (13.44)   Weight (kg) 91.17 91.17 91.17 91.17       Physical Exam    General: Intubated, mechanically ventilated, non responsive   Neck: Supple, no tracheal deviation  Cardiovascular: RRR  Pumonary: Non labored breathing on MV.  CTAB   Abdomen: Soft, non distended, non tender.   Ext: No peripheral edema, appropriate distal perfusion   Neuro: Not moving right upper and lower extremity     Lab Results   Component Value Date    WBC 11.5 (H) 10/12/2017    HGB 12.0 (L) 10/12/2017    HCT 37.6 (L) 10/12/2017     10/12/2017     (HH) 10/12/2017    POTASSIUM 4.1 10/12/2017    CHLORIDE 134 (H) 10/12/2017    CO2 24 10/12/2017    BUN 78 (H) 10/12/2017    CR 3.27 (H) 10/12/2017     (H) 10/12/2017    SED 6 2013    DD 1.7 (H) 2016    NTBNPI 2768 (H) 2016    NTBNP 1302 (H) 2016    TROPONIN <0.07 2005    TROPI 0.634 (HH) 10/08/2017    AST 24 10/06/2017    ALT 33 10/06/2017    ALKPHOS 85 10/06/2017    BILITOTAL 1.5 (H) 10/06/2017    INR 1.03 10/08/2017         Recent  Labs  Lab 10/12/17  0446 10/11/17  0312 10/10/17  0401 10/09/17  1056   PH 7.36 7.38 7.36 7.39   PCO2 43 39 46* 44   PO2 81 81 94 139*   HCO3 24 23 26 26   O2PER 50.0 40 40.0 50.0         A/P:  62 year old with history of AVR and ascending aortic repair in May 2016 presented with acute mental status changes found to have large moises-aortic fluid collection and multiple strokes likely septic emboli.       Neuro: Propofol and fentanyl gtt. 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  Tylenol for fevers, keep normothermic. Allow sodium to drift down. Appreciate neurology consult. Cerebral angiography yesterday normal. Minimal sedation for neuro examination. Daily CT head scans. Head CT stable thus far. Neuro checks Q2. Will discuss daily sedation holidays with neuro.   Resp: minimal vent settings, Continue CMV at current settings.  CV:  OWEN revealed vegetations on ventricular leaflet of aortic valve suggestive of endocarditis. Neuro okay with ASA starting today. 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 Monitor daily EKG. Goal SBP < 140 mmHg, nicardipine added   GI/FEN: Post pyloric feeding tube in place, keep infusing at goal rate. Hypernatremia trending down goal down by 5 mmol/L per day until normonatremic.   Renal: Good urine output. Continue to monitor. Cr increased but stable (3.3).  Endo: Insulin gtt   ID:  Nafcillin 2g IV Q4 hours and Levofloxacin 750mg IV Q48 hours. Gentamicin IV daily (x14 days to 10/19/17). Daily blood cultures; need to assess for clearance.  Heme: No bleeding concerns at this time  Prophylaxis: H2 blocker, hold heparin per neuro recs  Lines: arterial line, central line, PIVs. Consider changing when cultures clear.  Dispo: CVICU      Patient seen with Dr. Duncan, staff Intensivist      Sukhwinder Mejía MD  Anesthesiology Resident   [phone]

## 2017-10-13 ENCOUNTER — APPOINTMENT (OUTPATIENT)
Dept: GENERAL RADIOLOGY | Facility: CLINIC | Age: 62
DRG: 004 | End: 2017-10-13
Attending: NURSE PRACTITIONER
Payer: COMMERCIAL

## 2017-10-13 ENCOUNTER — APPOINTMENT (OUTPATIENT)
Dept: CT IMAGING | Facility: CLINIC | Age: 62
DRG: 004 | End: 2017-10-13
Attending: NURSE PRACTITIONER
Payer: COMMERCIAL

## 2017-10-13 LAB
ANION GAP SERPL CALCULATED.3IONS-SCNC: 4 MMOL/L (ref 3–14)
ANION GAP SERPL CALCULATED.3IONS-SCNC: 5 MMOL/L (ref 3–14)
ANION GAP SERPL CALCULATED.3IONS-SCNC: 6 MMOL/L (ref 3–14)
ANION GAP SERPL CALCULATED.3IONS-SCNC: 7 MMOL/L (ref 3–14)
BACTERIA SPEC CULT: NO GROWTH
BASE DEFICIT BLDA-SCNC: 0.2 MMOL/L
BUN SERPL-MCNC: 79 MG/DL (ref 7–30)
BUN SERPL-MCNC: 80 MG/DL (ref 7–30)
BUN SERPL-MCNC: 81 MG/DL (ref 7–30)
BUN SERPL-MCNC: 82 MG/DL (ref 7–30)
CALCIUM SERPL-MCNC: 7.6 MG/DL (ref 8.5–10.1)
CALCIUM SERPL-MCNC: 7.7 MG/DL (ref 8.5–10.1)
CALCIUM SERPL-MCNC: 7.7 MG/DL (ref 8.5–10.1)
CALCIUM SERPL-MCNC: 7.8 MG/DL (ref 8.5–10.1)
CALCIUM SERPL-MCNC: 7.9 MG/DL (ref 8.5–10.1)
CALCIUM SERPL-MCNC: 7.9 MG/DL (ref 8.5–10.1)
CHLORIDE SERPL-SCNC: 133 MMOL/L (ref 94–109)
CHLORIDE SERPL-SCNC: 134 MMOL/L (ref 94–109)
CHLORIDE SERPL-SCNC: 134 MMOL/L (ref 94–109)
CHLORIDE SERPL-SCNC: 135 MMOL/L (ref 94–109)
CHLORIDE SERPL-SCNC: 135 MMOL/L (ref 94–109)
CHLORIDE SERPL-SCNC: 136 MMOL/L (ref 94–109)
CK SERPL-CCNC: 28 U/L (ref 30–300)
CO2 SERPL-SCNC: 24 MMOL/L (ref 20–32)
CO2 SERPL-SCNC: 26 MMOL/L (ref 20–32)
CO2 SERPL-SCNC: 27 MMOL/L (ref 20–32)
CO2 SERPL-SCNC: 27 MMOL/L (ref 20–32)
CREAT SERPL-MCNC: 3.13 MG/DL (ref 0.66–1.25)
CREAT SERPL-MCNC: 3.16 MG/DL (ref 0.66–1.25)
CREAT SERPL-MCNC: 3.18 MG/DL (ref 0.66–1.25)
CREAT SERPL-MCNC: 3.2 MG/DL (ref 0.66–1.25)
CREAT SERPL-MCNC: 3.22 MG/DL (ref 0.66–1.25)
CREAT SERPL-MCNC: 3.23 MG/DL (ref 0.66–1.25)
CREAT SERPL-MCNC: 3.3 MG/DL (ref 0.66–1.25)
CREAT SERPL-MCNC: 3.31 MG/DL (ref 0.66–1.25)
ERYTHROCYTE [DISTWIDTH] IN BLOOD BY AUTOMATED COUNT: 18.6 % (ref 10–15)
GENTAMICIN SERPL-MCNC: 0.9 MG/L
GENTAMICIN SERPL-MCNC: 3 MG/L
GFR SERPL CREATININE-BSD FRML MDRD: 19 ML/MIN/1.7M2
GFR SERPL CREATININE-BSD FRML MDRD: 20 ML/MIN/1.7M2
GLUCOSE BLDC GLUCOMTR-MCNC: 134 MG/DL (ref 70–99)
GLUCOSE BLDC GLUCOMTR-MCNC: 136 MG/DL (ref 70–99)
GLUCOSE BLDC GLUCOMTR-MCNC: 139 MG/DL (ref 70–99)
GLUCOSE BLDC GLUCOMTR-MCNC: 151 MG/DL (ref 70–99)
GLUCOSE BLDC GLUCOMTR-MCNC: 157 MG/DL (ref 70–99)
GLUCOSE BLDC GLUCOMTR-MCNC: 159 MG/DL (ref 70–99)
GLUCOSE BLDC GLUCOMTR-MCNC: 159 MG/DL (ref 70–99)
GLUCOSE BLDC GLUCOMTR-MCNC: 160 MG/DL (ref 70–99)
GLUCOSE BLDC GLUCOMTR-MCNC: 169 MG/DL (ref 70–99)
GLUCOSE BLDC GLUCOMTR-MCNC: 183 MG/DL (ref 70–99)
GLUCOSE SERPL-MCNC: 155 MG/DL (ref 70–99)
GLUCOSE SERPL-MCNC: 174 MG/DL (ref 70–99)
GLUCOSE SERPL-MCNC: 176 MG/DL (ref 70–99)
GLUCOSE SERPL-MCNC: 178 MG/DL (ref 70–99)
GLUCOSE SERPL-MCNC: 183 MG/DL (ref 70–99)
GLUCOSE SERPL-MCNC: 188 MG/DL (ref 70–99)
GLUCOSE SERPL-MCNC: 189 MG/DL (ref 70–99)
GLUCOSE SERPL-MCNC: 196 MG/DL (ref 70–99)
HCO3 BLD-SCNC: 25 MMOL/L (ref 21–28)
HCT VFR BLD AUTO: 34.7 % (ref 40–53)
HGB BLD-MCNC: 10.7 G/DL (ref 13.3–17.7)
INTERPRETATION ECG - MUSE: NORMAL
MCH RBC QN AUTO: 29.6 PG (ref 26.5–33)
MCHC RBC AUTO-ENTMCNC: 30.8 G/DL (ref 31.5–36.5)
MCV RBC AUTO: 96 FL (ref 78–100)
O2/TOTAL GAS SETTING VFR VENT: 50 %
OSMOLALITY SERPL: 371 MMOL/KG (ref 280–301)
OSMOLALITY SERPL: 372 MMOL/KG (ref 280–301)
OSMOLALITY SERPL: 374 MMOL/KG (ref 280–301)
OSMOLALITY SERPL: 377 MMOL/KG (ref 280–301)
PCO2 BLD: 42 MM HG (ref 35–45)
PH BLD: 7.38 PH (ref 7.35–7.45)
PLATELET # BLD AUTO: 322 10E9/L (ref 150–450)
PO2 BLD: 112 MM HG (ref 80–105)
POTASSIUM SERPL-SCNC: 4.1 MMOL/L (ref 3.4–5.3)
POTASSIUM SERPL-SCNC: 4.2 MMOL/L (ref 3.4–5.3)
POTASSIUM SERPL-SCNC: 4.5 MMOL/L (ref 3.4–5.3)
POTASSIUM SERPL-SCNC: 4.5 MMOL/L (ref 3.4–5.3)
POTASSIUM SERPL-SCNC: 4.6 MMOL/L (ref 3.4–5.3)
RBC # BLD AUTO: 3.62 10E12/L (ref 4.4–5.9)
SODIUM BLD-SCNC: 169 MMOL/L (ref 133–144)
SODIUM SERPL-SCNC: 164 MMOL/L (ref 133–144)
SODIUM SERPL-SCNC: 165 MMOL/L (ref 133–144)
SODIUM SERPL-SCNC: 166 MMOL/L (ref 133–144)
SODIUM SERPL-SCNC: 167 MMOL/L (ref 133–144)
SODIUM SERPL-SCNC: 169 MMOL/L (ref 133–144)
SODIUM SERPL-SCNC: 169 MMOL/L (ref 133–144)
SPECIMEN SOURCE: NORMAL
TRIGL SERPL-MCNC: 265 MG/DL
WBC # BLD AUTO: 15.7 10E9/L (ref 4–11)

## 2017-10-13 PROCEDURE — 40000281 ZZH STATISTIC TRANSPORT TIME EA 15 MIN

## 2017-10-13 PROCEDURE — 80170 ASSAY OF GENTAMICIN: CPT | Performed by: STUDENT IN AN ORGANIZED HEALTH CARE EDUCATION/TRAINING PROGRAM

## 2017-10-13 PROCEDURE — 25000125 ZZHC RX 250: Performed by: THORACIC SURGERY (CARDIOTHORACIC VASCULAR SURGERY)

## 2017-10-13 PROCEDURE — 84295 ASSAY OF SERUM SODIUM: CPT | Performed by: ORTHOPAEDIC SURGERY

## 2017-10-13 PROCEDURE — 82803 BLOOD GASES ANY COMBINATION: CPT | Performed by: ORTHOPAEDIC SURGERY

## 2017-10-13 PROCEDURE — 25000132 ZZH RX MED GY IP 250 OP 250 PS 637: Performed by: SURGERY

## 2017-10-13 PROCEDURE — 93005 ELECTROCARDIOGRAM TRACING: CPT

## 2017-10-13 PROCEDURE — 25000128 H RX IP 250 OP 636: Performed by: SURGERY

## 2017-10-13 PROCEDURE — 94003 VENT MGMT INPAT SUBQ DAY: CPT

## 2017-10-13 PROCEDURE — 25000132 ZZH RX MED GY IP 250 OP 250 PS 637: Performed by: THORACIC SURGERY (CARDIOTHORACIC VASCULAR SURGERY)

## 2017-10-13 PROCEDURE — 71010 XR CHEST PORT 1 VW: CPT

## 2017-10-13 PROCEDURE — 70450 CT HEAD/BRAIN W/O DYE: CPT

## 2017-10-13 PROCEDURE — 80048 BASIC METABOLIC PNL TOTAL CA: CPT | Performed by: STUDENT IN AN ORGANIZED HEALTH CARE EDUCATION/TRAINING PROGRAM

## 2017-10-13 PROCEDURE — 87040 BLOOD CULTURE FOR BACTERIA: CPT | Performed by: STUDENT IN AN ORGANIZED HEALTH CARE EDUCATION/TRAINING PROGRAM

## 2017-10-13 PROCEDURE — 82550 ASSAY OF CK (CPK): CPT | Performed by: ORTHOPAEDIC SURGERY

## 2017-10-13 PROCEDURE — 20000004 ZZH R&B ICU UMMC

## 2017-10-13 PROCEDURE — 25000128 H RX IP 250 OP 636: Performed by: NEUROLOGICAL SURGERY

## 2017-10-13 PROCEDURE — 87077 CULTURE AEROBIC IDENTIFY: CPT | Performed by: STUDENT IN AN ORGANIZED HEALTH CARE EDUCATION/TRAINING PROGRAM

## 2017-10-13 PROCEDURE — 40000014 ZZH STATISTIC ARTERIAL MONITORING DAILY

## 2017-10-13 PROCEDURE — S0028 INJECTION, FAMOTIDINE, 20 MG: HCPCS | Performed by: THORACIC SURGERY (CARDIOTHORACIC VASCULAR SURGERY)

## 2017-10-13 PROCEDURE — 25000128 H RX IP 250 OP 636: Performed by: STUDENT IN AN ORGANIZED HEALTH CARE EDUCATION/TRAINING PROGRAM

## 2017-10-13 PROCEDURE — 36415 COLL VENOUS BLD VENIPUNCTURE: CPT | Performed by: STUDENT IN AN ORGANIZED HEALTH CARE EDUCATION/TRAINING PROGRAM

## 2017-10-13 PROCEDURE — S0032 INJECTION, NAFCILLIN SODIUM: HCPCS | Performed by: THORACIC SURGERY (CARDIOTHORACIC VASCULAR SURGERY)

## 2017-10-13 PROCEDURE — 25000132 ZZH RX MED GY IP 250 OP 250 PS 637: Performed by: STUDENT IN AN ORGANIZED HEALTH CARE EDUCATION/TRAINING PROGRAM

## 2017-10-13 PROCEDURE — 99291 CRITICAL CARE FIRST HOUR: CPT | Mod: GC | Performed by: INTERNAL MEDICINE

## 2017-10-13 PROCEDURE — 94681 O2 UPTK CO2 OUTP % O2 XTRC: CPT

## 2017-10-13 PROCEDURE — 40000275 ZZH STATISTIC RCP TIME EA 10 MIN

## 2017-10-13 PROCEDURE — 83930 ASSAY OF BLOOD OSMOLALITY: CPT | Performed by: ORTHOPAEDIC SURGERY

## 2017-10-13 PROCEDURE — 27210437 ZZH NUTRITION PRODUCT SEMIELEM INTERMED LITER

## 2017-10-13 PROCEDURE — 85027 COMPLETE CBC AUTOMATED: CPT | Performed by: ORTHOPAEDIC SURGERY

## 2017-10-13 PROCEDURE — 84478 ASSAY OF TRIGLYCERIDES: CPT | Performed by: ORTHOPAEDIC SURGERY

## 2017-10-13 PROCEDURE — 93010 ELECTROCARDIOGRAM REPORT: CPT | Performed by: INTERNAL MEDICINE

## 2017-10-13 PROCEDURE — 25000128 H RX IP 250 OP 636: Performed by: THORACIC SURGERY (CARDIOTHORACIC VASCULAR SURGERY)

## 2017-10-13 PROCEDURE — 80048 BASIC METABOLIC PNL TOTAL CA: CPT | Performed by: ORTHOPAEDIC SURGERY

## 2017-10-13 PROCEDURE — 80170 ASSAY OF GENTAMICIN: CPT | Performed by: THORACIC SURGERY (CARDIOTHORACIC VASCULAR SURGERY)

## 2017-10-13 PROCEDURE — 83930 ASSAY OF BLOOD OSMOLALITY: CPT | Performed by: STUDENT IN AN ORGANIZED HEALTH CARE EDUCATION/TRAINING PROGRAM

## 2017-10-13 PROCEDURE — 00000146 ZZHCL STATISTIC GLUCOSE BY METER IP

## 2017-10-13 RX ORDER — SODIUM CHLORIDE, SODIUM LACTATE, POTASSIUM CHLORIDE, CALCIUM CHLORIDE 600; 310; 30; 20 MG/100ML; MG/100ML; MG/100ML; MG/100ML
INJECTION, SOLUTION INTRAVENOUS
Status: DISCONTINUED
Start: 2017-10-13 | End: 2017-10-14 | Stop reason: HOSPADM

## 2017-10-13 RX ADMIN — FENTANYL CITRATE 200 MCG/HR: 50 INJECTION, SOLUTION INTRAMUSCULAR; INTRAVENOUS at 01:24

## 2017-10-13 RX ADMIN — PROPOFOL 30 MCG/KG/MIN: 10 INJECTION, EMULSION INTRAVENOUS at 05:33

## 2017-10-13 RX ADMIN — NAFCILLIN SODIUM 2 G: 2 INJECTION, POWDER, LYOPHILIZED, FOR SOLUTION INTRAMUSCULAR; INTRAVENOUS at 22:10

## 2017-10-13 RX ADMIN — PROPOFOL 30 MCG/KG/MIN: 10 INJECTION, EMULSION INTRAVENOUS at 01:24

## 2017-10-13 RX ADMIN — POLYETHYLENE GLYCOL 3350 17 G: 17 POWDER, FOR SOLUTION ORAL at 08:12

## 2017-10-13 RX ADMIN — GENTAMICIN SULFATE 80 MG: 80 INJECTION, SOLUTION INTRAVENOUS at 09:36

## 2017-10-13 RX ADMIN — SENNOSIDES AND DOCUSATE SODIUM 1 TABLET: 8.6; 5 TABLET ORAL at 08:12

## 2017-10-13 RX ADMIN — NAFCILLIN SODIUM 2 G: 2 INJECTION, POWDER, LYOPHILIZED, FOR SOLUTION INTRAMUSCULAR; INTRAVENOUS at 14:11

## 2017-10-13 RX ADMIN — ACETAMINOPHEN 650 MG: 325 SOLUTION ORAL at 05:42

## 2017-10-13 RX ADMIN — NAFCILLIN SODIUM 2 G: 2 INJECTION, POWDER, LYOPHILIZED, FOR SOLUTION INTRAMUSCULAR; INTRAVENOUS at 10:56

## 2017-10-13 RX ADMIN — NAFCILLIN SODIUM 2 G: 2 INJECTION, POWDER, LYOPHILIZED, FOR SOLUTION INTRAMUSCULAR; INTRAVENOUS at 05:33

## 2017-10-13 RX ADMIN — ACETAMINOPHEN 650 MG: 325 SOLUTION ORAL at 12:14

## 2017-10-13 RX ADMIN — HYDRALAZINE HYDROCHLORIDE 20 MG: 20 INJECTION INTRAMUSCULAR; INTRAVENOUS at 12:14

## 2017-10-13 RX ADMIN — ACETAMINOPHEN 650 MG: 325 SOLUTION ORAL at 15:58

## 2017-10-13 RX ADMIN — HUMAN INSULIN 1 UNITS/HR: 100 INJECTION, SOLUTION SUBCUTANEOUS at 12:05

## 2017-10-13 RX ADMIN — MULTIVITAMIN 15 ML: LIQUID ORAL at 08:12

## 2017-10-13 RX ADMIN — ASPIRIN 81 MG CHEWABLE TABLET 81 MG: 81 TABLET CHEWABLE at 08:12

## 2017-10-13 RX ADMIN — PROPOFOL 25 MCG/KG/MIN: 10 INJECTION, EMULSION INTRAVENOUS at 18:27

## 2017-10-13 RX ADMIN — NAFCILLIN SODIUM 2 G: 2 INJECTION, POWDER, LYOPHILIZED, FOR SOLUTION INTRAMUSCULAR; INTRAVENOUS at 18:54

## 2017-10-13 RX ADMIN — FAMOTIDINE 20 MG: 10 INJECTION, SOLUTION INTRAVENOUS at 10:28

## 2017-10-13 RX ADMIN — FENTANYL CITRATE 150 MCG/HR: 50 INJECTION, SOLUTION INTRAMUSCULAR; INTRAVENOUS at 08:34

## 2017-10-13 RX ADMIN — NAFCILLIN SODIUM 2 G: 2 INJECTION, POWDER, LYOPHILIZED, FOR SOLUTION INTRAMUSCULAR; INTRAVENOUS at 01:45

## 2017-10-13 RX ADMIN — PROPOFOL 25 MCG/KG/MIN: 10 INJECTION, EMULSION INTRAVENOUS at 10:28

## 2017-10-13 RX ADMIN — PROPOFOL 25 MCG/KG/MIN: 10 INJECTION, EMULSION INTRAVENOUS at 23:57

## 2017-10-13 ASSESSMENT — VISUAL ACUITY
OU: OTHER (SEE COMMENT)

## 2017-10-13 NOTE — PROGRESS NOTES
Patient sent to CT of the head STAT after neurocritical care fellow determined that corneal reflexes were more diminished on his exam than the previous exam. Patient transferred to and from CT scanner without any adverse events. See charted transcribed CT note for details. Will continue to monitor for safety and comfort.    Jewel Mckenna RN

## 2017-10-13 NOTE — PLAN OF CARE
Problem: Patient Care Overview  Goal: Plan of Care/Patient Progress Review  PT cx, patient off floor for CT scan.

## 2017-10-13 NOTE — PROGRESS NOTES
CLINICAL NUTRITION SERVICES - REASSESSMENT NOTE     Nutrition Prescription    RECOMMENDATIONS FOR MDs/PROVIDERS TO ORDER:  Free water per MD pending Na goals    Malnutrition Status:    Severe malnutrition in the context of acute illness    Recommendations already ordered by Registered Dietitian (RD):  Increase to Impact Peptide @ goal 60 ml/hr (1440 ml/day) to provide 2160 kcals (75% MREE or 29 kcal/kg/day + pending propofol intakes), 135 g PRO (1.8 g/kg/day), 1109 ml free H2O, 92 g Fat (50% from MCTs), 202 g CHO and no Fiber daily.    Future/Additional Recommendations:  1. May need to increase TF provisions further if propofol d/c.   2. If remains intubated, recommend obtain repeat metabolic cart study to assess approp of energy provisions to avoid over/under feeding.     EVALUATION OF THE PROGRESS TOWARD GOALS   Diet: NPO    Enteral Access: NDT placed by RD on 10/9    Nutrition Support: Impact Peptide @ goal 55 ml/hr (1320 ml/day) to provide 1980 kcals (26 kcal/kg/day), 124 g PRO (1.6 g/kg/day), 1016 ml free H2O, 84 g Fat (50% from MCTs), 185 g CHO and no Fiber daily    Intake: TF reached goal rate of infusion on 10/11. Yesterday pt received 100% goal volume. Overall average intake (TF + dextrose + propofol) over 5 days = 1215 kcal (16 kcal/kg) and 55 g PRO (0. 7 g/kg). This met 42% MREE and 60% protein needs.      NEW FINDINGS   -Resp/CV: Obtained metabolic cart study 10/13 @ 1100 with the following results: MREE = 2868 kcals/day (equiv to 38 kcal/kg/day) with RQ = 0.8.  Pt received 1320 ml of TF + propofol in 24 hours preceding the study providing 2494 kcals (87% MREE).  RQ is within physiologic range and logical given provisions (kcals and PRO) received prior to study.  Would aim energy intakes minimally at 80% of this MREE (equiv to 31 kcal/kg/day).  -Wt: lowest wt 89.1 kg on 10/10 - 4% wt loss since admission. New dosing wt 75 kg.   -GI: 2x BM on 10/11, no BM yesterday, x1 since midnight. On bowel  regimen.  -Skin: Satish 12. On certavite for additional micronutrients   -Labs: Na 169 (H) - per team goal of 150-160. On free water 100 mL/hr.  on 10/9.  -Meds: Propofol gtt. Average intake over 5 days provided ~256 kcal/day. Needs     REASSESSED NUTRITION NEEDS  Estimated Energy Needs: 2294 - 2868 kcals/day (80 - 100% MREE or 31 - 38 kcals/kg)  Justification: per MREE  Estimated Protein Needs: 90 - 113 grams protein/day (1.2 - 1.5 grams of pro/kg)  Justification: Hypercatabolism with critical illness    MALNUTRITION  % Intake: </= 50% for >/= 5 days (severe)  % Weight Loss: > 2% in 1 week (severe)  Subcutaneous Fat Loss: None observed  Muscle Loss: Temporal and Upper leg (quadricep, hamstring): mild  Fluid Accumulation/Edema: None noted  Malnutrition Diagnosis: Severe malnutrition in the context of acute illness    Previous Goals   Total avg nutritional intake to meet a minimum of 20 kcal/kg and 1.2 gm PRO/kg daily (per dosing wt 76 kg adjusted wt ).  Evaluation: Not met    Previous Nutrition Diagnosis  Inadequate oral intake related to resp status inhibiting ability to take PO as evidenced by pt NPO x 2-3 days, with plan to start TF support today.   Evaluation: No longer applicable, nutrition diagnosis changed below    CURRENT NUTRITION DIAGNOSIS  Inadequate protein-energy intake related to inadequate EN infusion, increased metabolic needs as evidenced by TF provided 5-day average of 42% MREE and 60% protein needs (vs minimum needs listed above), 4% wt loss since admission    INTERVENTIONS  Implementation  Ordered metabolic cart study  Increased TF to 60 mL/hr    Goals  Total avg nutritional intake to meet a minimum of 80% MREE (31 kcal/kg) and 1.2 g PRO/kg daily (per dosing wt 75 kg).    Monitoring/Evaluation  Progress toward goals will be monitored and evaluated per protocol.    Maricel Childers RD, LD, [facility]  CV[facility] Dietitian  Pager: [phone]

## 2017-10-13 NOTE — PLAN OF CARE
"Problem: Patient Care Overview  Goal: Plan of Care/Patient Progress Review  Outcome: No Change  No change in patient's overall condition during my shift. Patient remains in NSR with no observed incidence of ectopy and BP's stable and monitored via an arterial line that has progressively become more dampened and less correlating with his cuff pressures. Ventilator settings have remained the same with more observed \"belly breathing\". Unsure what could be causative for this, but no observed signs of respiratory distress have been observed. Patient tube feeds were increased to 60ml/hr - currently at that rate. Patient started on insulin gtt for BG control - see charted rates and BG levels. Will continue to monitor for safety and comfort.     Jewel Mckenna RN      "

## 2017-10-13 NOTE — PROGRESS NOTES
CVTS Progress Note     Dissection of aorta, unspecified portion of aorta (H)  Sepsis due to other etiology (H)  NSTEMI (non-ST elevated myocardial infarction) (H)  Hypotension, unspecified hypotension type  S/P AVR (aortic valve replacement)    Subjective: no acute events     Objective:  Temp:  [99.9  F (37.7  C)-101.7  F (38.7  C)] 100.9  F (38.3  C)  Heart Rate:  [74-91] 79  Resp:  [16-20] 16  BP: ()/(57-88) 125/77  MAP:  [68 mmHg-239 mmHg] 68 mmHg  Arterial Line BP: ()/(56-81) 95/56  FiO2 (%):  [50 %] 50 %  SpO2:  [94 %-97 %] 95 %    Ventilation Mode: CMV/AC  FiO2 (%): 50 %  Rate Set (breaths/minute): 16 breaths/min  Tidal Volume Set (mL): 500 mL  PEEP (cm H2O): 5 cmH2O  Oxygen Concentration (%): 40 %  Resp: 16    I/O last 3 completed shifts:  In: 3806.87 [I.V.:1346.87; NG/GT:1140]  Out: 3075 [Urine:3075]    PE:  General: Intubated, lightly sedated  Neck: Supple, no tracheal deviation  Cardiovascular: RRR  Pumonary: Non labored breathing on MV  Abdomen: Soft, non distended, non tender  Ext: minimal edema, warm  Neuro: moving all extremities spontaneously    BMP    Recent Labs  Lab 10/13/17  0357 10/12/17  2221 10/12/17  1748 10/12/17  1535  10/12/17  0446  10/11/17  0312  10/10/17  0401  10/09/17  1529 10/09/17  0316   *  169* 166* 167* 166*  < > 167*  167*  < > 162*  < > 151*  < > 146* 146*   POTASSIUM 4.5 4.9 4.2 4.1  --  4.2  --  3.8  --  4.4  --  4.3 3.4   CHLORIDE 136* 135* 134* 134*  --  134*  >125*  --  130*  --  118*  --  113* 112*   CO2 26 26 25 24  --  24  --  24  --  25  --  25 26   BUN 81* 79* 78* 78*  --  74*  --  60*  --  44*  --  32* 21   CR 3.31* 3.24* 3.29* 3.27*  --  3.31*  --  3.35*  --  3.06*  --  2.40* 1.46*   * 180* 178* 194*  --  209*  --  201*  --  162*  --  154* 140*   MAG  --   --   --   --   --   --   --  3.1*  --  2.6*  --  2.5* 2.1   PHOS  --   --   --   --   --  3.1  --  3.6  --  4.8*  --   --  2.5   < > = values in this interval not  displayed.  CBC    Recent Labs  Lab 10/13/17  0357 10/12/17  0446 10/11/17  0312 10/10/17  0401   WBC 15.7* 11.5* 13.4* 10.9   HGB 10.7* 12.0* 13.4 11.0*    266 238 155     INR/PTT    Recent Labs  Lab 10/08/17  0328 10/06/17  1322 10/06/17  1020   INR 1.03 1.25* 1.23*   PTT  --  32 32     ABG    Recent Labs  Lab 10/13/17  0357 10/12/17  0446 10/11/17  0312 10/10/17  0401   PH 7.38 7.36 7.38 7.36   PCO2 42 43 39 46*   PO2 112* 81 81 94   HCO3 25 24 23 26     LFT    Recent Labs  Lab 10/06/17  1020   AST 24   ALT 33   ALKPHOS 85   BILITOTAL 1.5*   ALBUMIN 2.8*       A/P   62 year old with history of AVR and ascending aortic repair in May 2016 presented on 10/6 with acute mental status changes found to have large moises-aortic fluid collection and multiple strokes likely septic emboli.    Neuro: tylenol for fevers,continue neuro checks Q2H, propofol for sedation as needed  Resp: inability to protect airway requiring mechanical ventilation, on minimal vent settings, could wean to extubate if mental status improves   CV: goal normotension per neurology/neurosurgery  GI/FEN: tube feeds to goal, increase free water for hypernatremia  Renal: JOSE non-oliguric, clinically euvolemic, hold lasix today   Endo: Insulin gtt for glucose control  ID: continue current antibiotic regimen pending ID recommendations, daily blood cultures negative most recently  Heme: no bleeding concerns at this time, hold anticoagulation per neuro and neurosurgery  Prophylaxis: H2 blocker, no chemical dvt ppx due to evolving stroke  Lines: central line and arterial line: continue  Dispo: CV-ICU    Abdoulaye Andres MD  Surgery PGY-3

## 2017-10-13 NOTE — PROGRESS NOTES
Neuroscience Intensive Care Progress Note    10/12/2017    24 hour events:  Stable Exam . Stable repeat CT - No hydrocephalous.    24 Hour Vital Signs Summary:  Temperatures:  Current - Temp: 99.9  F (37.7  C); Max - Temp  Av.9  F (37.7  C)  Min: 99  F (37.2  C)  Max: 101.7  F (38.7  C)  Respiration range: Resp  Av  Min: 16  Max: 20  Pulse range: No Data Recorded  Blood pressure range: Systolic (24hrs), Av , Min:109 , Max:158   ; Diastolic (24hrs), Av, Min:71, Max:88    Pulse oximetry range: SpO2  Av.1 %  Min: 93 %  Max: 98 %    Ventilator Settings  Ventilation Mode: CMV/AC  FiO2 (%): 50 %  Rate Set (breaths/minute): 16 breaths/min  Tidal Volume Set (mL): 500 mL  PEEP (cm H2O): 5 cmH2O  Oxygen Concentration (%): 50 %  Resp: 20      Intake/Output Summary (Last 24 hours) at 10/12/17 1923  Last data filed at 10/12/17 1800   Gross per 24 hour   Intake          4381.76 ml   Output             2550 ml   Net          1831.76 ml       Arterial Line BP: ()/(51-87) 162/81  MAP:  [66 mmHg-239 mmHg] 111 mmHg  BP - Mean:  [] 115    Current Medications:    aspirin  81 mg Oral or Feeding Tube Daily     [START ON 10/13/2017] gentamicin  80 mg Intravenous Once     nafcillin  2 g Intravenous Q4H     gentamicin (GARAMYCIN) place vega - receiving intermittent dosing  1 each Does not apply See Admin Instructions     polyethylene glycol  17 g Oral Daily     levofloxacin  750 mg Intravenous Q48H     famotidine  20 mg Intravenous Q24H     [START ON 10/15/2017] influenza quadrivalent (PF) vacc age 3 yrs and older  0.5 mL Intramuscular Prior to discharge     [START ON 10/15/2017] pneumococcal vaccine  0.5 mL Intramuscular Prior to discharge     sodium chloride (PF)  3 mL Intravenous Q8H     multivitamins with minerals  15 mL Per Feeding Tube Daily     senna-docusate  1-2 tablet Oral or Feeding Tube BID     sodium chloride (PF)  3 mL Intracatheter Q8H       PRN Medications:  fentaNYL, naloxone, midazolam,  "flumazenil, lidocaine BUFFERED 1 %, HOLD MEDICATION, ondansetron **OR** ondansetron, hydrALAZINE, sodium chloride (PF), - MEDICATION INSTRUCTIONS -, - MEDICATION INSTRUCTIONS -, acetaminophen, prochlorperazine **OR** prochlorperazine, naloxone, IV fluid REPLACEMENT ONLY, glucose **OR** dextrose **OR** glucagon, lidocaine (buffered or not buffered), lidocaine 4%, sodium chloride (PF), Reason beta blocker order not selected, albumin human, insulin aspart, albuterol, albuterol, fentaNYL, potassium chloride, potassium chloride, potassium chloride, potassium chloride with lidocaine, potassium chloride, magnesium sulfate, magnesium sulfate, potassium phosphate (KPHOS) in D5W IV, potassium phosphate (KPHOS) in D5W IV, potassium phosphate (KPHOS) in D5W IV, potassium phosphate (KPHOS) in D5W IV, potassium phosphate (KPHOS) in D5W IV, diphenhydrAMINE **OR** diphenhydrAMINE    Infusions:    niCARdipine (CARDENE) infusion ADULT/PEDS GREATER than or EQUAL to 45 kg max conc Stopped (10/11/17 1014)     fentaNYL 200 mcg/hr (10/12/17 1812)     - MEDICATION INSTRUCTIONS -       - MEDICATION INSTRUCTIONS -       propofol (DIPRIVAN) infusion 30 mcg/kg/min (10/12/17 1800)     IV fluid REPLACEMENT ONLY       insulin (regular) Stopped (10/08/17 1821)     Reason beta blocker order not selected         Allergies   Allergen Reactions     Lisinopril Swelling     One-sided facial swelling after being on lisinopril/HCTZ for one week.        Physical Examination:  /88  Pulse 82  Temp 99.9  F (37.7  C)  Resp 20  Ht 1.727 m (5' 8\")  Wt 91.2 kg (201 lb)  SpO2 97%  BMI 30.56 kg/m2  Lethargic - responds to painful stimuli. No gaze abnormality noted. PERRLA. Occulo cephalic intact. Moves left UE and LE with minimally painful stimuli. No movement on RUE noted. Minimal withdrawal on RLE.       Labs/Studies:  Recent Labs   Lab Test  10/12/17   1748  10/12/17   1535  10/12/17   1401   10/12/17   0446   10/11/17   0312   10/10/17   0401   NA  " 167*  166*  168*   < >  167*  167*   < >  162*   < >  151*   POTASSIUM  4.2  4.1   --    --   4.2   --   3.8   --   4.4   CHLORIDE  134*  134*   --    --   134*  >125*   --   130*   --   118*   CO2  25  24   --    --   24   --   24   --   25   ANIONGAP  8  8   --    --   9   --   8   --   8   GLC  178*  194*   --    --   209*   --   201*   --   162*   BUN  78*  78*   --    --   74*   --   60*   --   44*   CR  3.29*  3.27*   --    --   3.31*   --   3.35*   --   3.06*   IZABELLA  8.0*  7.8*   --    --   7.8*   --   8.1*   --   7.5*   WBC   --    --    --    --   11.5*   --   13.4*   --   10.9   RBC   --    --    --    --   4.08*   --   4.45   --   3.71*   HGB   --    --    --    --   12.0*   --   13.4   --   11.0*   PLT   --    --    --    --   266   --   238   --   155    < > = values in this interval not displayed.       Recent Labs   Lab Test  10/08/17   0328  10/06/17   1322  10/06/17   1020  05/18/16   0435   INR  1.03  1.25*  1.23*  1.28*   PTT   --   32  32  35           Recent Labs  Lab 10/12/17  0446 10/11/17  0312 10/10/17  0401 10/09/17  1056   PH 7.36 7.38 7.36 7.39   PCO2 43 39 46* 44   PO2 81 81 94 139*   HCO3 24 23 26 26   O2PER 50.0 40 40.0 50.0           Imaging:  Reviewed    Assessment/Plan    - Stable exam  - Repeat CT tstable for evaluation of edema related to right PICA infarct.  - ASA 81 mg   - minimal sedation to assess neurologic exam  - hypertonic saline off, Na 167 - can allow to slowly drift down, consider lowering close to 160 in next 12-24 hour, Serum osmolarity is correlating Na level, can DC to follow.   - Cerebral Angiogram normal  - will continue to follow      Plan discussed with Dr. Miller.      Siddhartha Dyson  Fellow

## 2017-10-13 NOTE — PROGRESS NOTES
Neuroscience Intensive Care Progress Note    10/13/2017      24 hour events:  Small sluggishly reactive pupil bilaterally approximately 2mm - Left oculocephalic abscent     24 Hour Vital Signs Summary:  Temperatures:  Current - Temp: 101.1  F (38.4  C); Max - Temp  Av.8  F (38.2  C)  Min: 99.9  F (37.7  C)  Max: 101.5  F (38.6  C)  Respiration range: Resp  Av.4  Min: 16  Max: 20  Pulse range: No Data Recorded  Blood pressure range: Systolic (24hrs), Av , Min:94 , Max:175   ; Diastolic (24hrs), Av, Min:57, Max:98    Pulse oximetry range: SpO2  Av.2 %  Min: 93 %  Max: 97 %    Ventilator Settings  Ventilation Mode: CMV/AC  FiO2 (%): 50 %  Rate Set (breaths/minute): 16 breaths/min  Tidal Volume Set (mL): 500 mL  PEEP (cm H2O): 5 cmH2O  Oxygen Concentration (%): 50 %  Resp: 17      Intake/Output Summary (Last 24 hours) at 10/13/17 1808  Last data filed at 10/13/17 1700   Gross per 24 hour   Intake          3939.68 ml   Output             3025 ml   Net           914.68 ml       Arterial Line BP: ()/(46-94) 120/58  MAP:  [59 mmHg-120 mmHg] 78 mmHg  BP - Mean:  [] 107    Current Medications:    lactated ringers         aspirin  81 mg Oral or Feeding Tube Daily     nafcillin  2 g Intravenous Q4H     gentamicin (GARAMYCIN) place vega - receiving intermittent dosing  1 each Does not apply See Admin Instructions     polyethylene glycol  17 g Oral Daily     levofloxacin  750 mg Intravenous Q48H     famotidine  20 mg Intravenous Q24H     [START ON 10/15/2017] influenza quadrivalent (PF) vacc age 3 yrs and older  0.5 mL Intramuscular Prior to discharge     [START ON 10/15/2017] pneumococcal vaccine  0.5 mL Intramuscular Prior to discharge     sodium chloride (PF)  3 mL Intravenous Q8H     multivitamins with minerals  15 mL Per Feeding Tube Daily     senna-docusate  1-2 tablet Oral or Feeding Tube BID     sodium chloride (PF)  3 mL Intracatheter Q8H       PRN Medications:  fentaNYL, naloxone,  "midazolam, flumazenil, lidocaine BUFFERED 1 %, ondansetron **OR** ondansetron, hydrALAZINE, sodium chloride (PF), - MEDICATION INSTRUCTIONS -, acetaminophen, prochlorperazine **OR** prochlorperazine, naloxone, IV fluid REPLACEMENT ONLY, glucose **OR** dextrose **OR** glucagon, lidocaine (buffered or not buffered), lidocaine 4%, sodium chloride (PF), Reason beta blocker order not selected, insulin aspart, albuterol, albuterol, fentaNYL, potassium chloride, potassium chloride, potassium chloride, potassium chloride with lidocaine, potassium chloride, magnesium sulfate, magnesium sulfate, potassium phosphate (KPHOS) in D5W IV, potassium phosphate (KPHOS) in D5W IV, potassium phosphate (KPHOS) in D5W IV, potassium phosphate (KPHOS) in D5W IV, potassium phosphate (KPHOS) in D5W IV, diphenhydrAMINE **OR** diphenhydrAMINE    Infusions:    niCARdipine (CARDENE) infusion ADULT/PEDS GREATER than or EQUAL to 45 kg max conc Stopped (10/11/17 1014)     fentaNYL 100 mcg/hr (10/13/17 1551)     - MEDICATION INSTRUCTIONS -       propofol (DIPRIVAN) infusion 25 mcg/kg/min (10/13/17 1255)     IV fluid REPLACEMENT ONLY       insulin (regular) 0.5 Units/hr (10/13/17 1556)     Reason beta blocker order not selected         Allergies   Allergen Reactions     Lisinopril Swelling     One-sided facial swelling after being on lisinopril/HCTZ for one week.        Physical Examination:  BP (!) 139/93  Pulse 82  Temp 101.1  F (38.4  C)  Resp 17  Ht 1.727 m (5' 8\")  Wt 93.3 kg (205 lb 11 oz)  SpO2 96%  BMI 31.27 kg/m2  Intubated, on sedation. 2mm sluggish pupil - no corneal response on left - brisk response on right. Left oculocephalic absent. Not withdrawing with painful stimuli.        Labs/Studies:  Recent Labs   Lab Test  10/13/17   1553  10/13/17   1406  10/13/17   1204   10/13/17   0357   10/12/17   0446   10/11/17   0312   NA  165*  167*  166*   < >  169*  169*   < >  167*  167*   < >  162*   POTASSIUM  4.1  4.2  4.5   < >  4.5   " < >  4.2   --   3.8   CHLORIDE  133*  135*  134*   < >  136*   < >  134*  >125*   --   130*   CO2  27  27  26   < >  26   < >  24   --   24   ANIONGAP  5  6  6   < >  7   < >  9   --   8   GLC  155*  188*  176*   < >  183*   < >  209*   --   201*   BUN  79*  81*  82*   < >  81*   < >  74*   --   60*   CR  3.20*  3.22*  3.16*   < >  3.31*   < >  3.31*   --   3.35*   IZABELLA  7.9*  7.8*  7.7*   < >  7.8*   < >  7.8*   --   8.1*   WBC   --    --    --    --   15.7*   --   11.5*   --   13.4*   RBC   --    --    --    --   3.62*   --   4.08*   --   4.45   HGB   --    --    --    --   10.7*   --   12.0*   --   13.4   PLT   --    --    --    --   322   --   266   --   238    < > = values in this interval not displayed.       Recent Labs   Lab Test  10/08/17   0328  10/06/17   1322  10/06/17   1020  05/18/16   0435   INR  1.03  1.25*  1.23*  1.28*   PTT   --   32  32  35           Recent Labs  Lab 10/13/17  0357 10/12/17  0446 10/11/17  0312 10/10/17  0401   PH 7.38 7.36 7.38 7.36   PCO2 42 43 39 46*   PO2 112* 81 81 94   HCO3 25 24 23 26   O2PER 50.0 50.0 40 40.0           Imaging:  Reviewed     Assessment/Plan      - Exam changes noted above  - Repeat CT with no significant change  - ASA 81 mg   - minimal sedation to assess neurologic exam  - hypertonic saline off, Na 165 - can allow to slowly drift down - given exam change, avoid sudden drop in Na.  - Cerebral Angiogram normal  - will continue to follow      Plan discussed with Dr. Miller.      Siddhartha Dyson  Fellow

## 2017-10-13 NOTE — PROGRESS NOTES
CV ICU   Progress Note:    S/Interval History: Overnight no events.     O:  Temp: 101.1  F (38.4  C) Temp  Min: 99.9  F (37.7  C)  Max: 101.7  F (38.7  C)  Resp: 16 Resp  Min: 16  Max: 20  SpO2: 97 % SpO2  Min: 94 %  Max: 97 %    No Data Recorded  Heart Rate: 83 Heart Rate  Min: 74  Max: 91  BP: (!) 153/96   Systolic (24hrs), Av , Min:94 , Max:158   Diastolic (24hrs), Av, Min:57, Max:96    Ventilation Mode: CMV/AC  FiO2 (%): 50 %  Rate Set (breaths/minute): 16 breaths/min  Tidal Volume Set (mL): 500 mL  PEEP (cm H2O): 5 cmH2O  Oxygen Concentration (%): 50 %  Resp: 16     Date 10/13/17 0700 - 10/14/17 0659   Shift 0524-6587 7897-3088 1638-9701 24 Hour Total   I  N  T  A  K  E   I.V. 391.77   391.77    NG/   375    Enteral 220   220    Shift Total  (mL/kg) 986.77  (10.58)   986.77  (10.58)   O  U  T  P  U  T   Urine 500   500    Shift Total  (mL/kg) 500  (5.36)   500  (5.36)   Weight (kg) 93.3 93.3 93.3 93.3       Physical Exam    General: Intubated, mechanically ventilated, non responsive   Neck: Supple, no tracheal deviation  Cardiovascular: RRR  Pumonary: Non labored breathing on MV.  CTAB   Abdomen: Soft, non distended, non tender.   Ext: No peripheral edema, appropriate distal perfusion   Neuro: Not moving right upper and lower extremity     Lab Results   Component Value Date    WBC 15.7 (H) 10/13/2017    HGB 10.7 (L) 10/13/2017    HCT 34.7 (L) 10/13/2017     10/13/2017     (HH) 10/13/2017    POTASSIUM 4.6 10/13/2017    CHLORIDE 136 (H) 10/13/2017    CO2 26 10/13/2017    BUN 81 (H) 10/13/2017    CR 3.30 (H) 10/13/2017     (H) 10/13/2017    SED 6 2013    DD 1.7 (H) 2016    NTBNPI 2768 (H) 2016    NTBNP 1302 (H) 2016    TROPONIN <0.07 2005    TROPI 0.634 (HH) 10/08/2017    AST 24 10/06/2017    ALT 33 10/06/2017    ALKPHOS 85 10/06/2017    BILITOTAL 1.5 (H) 10/06/2017    INR 1.03 10/08/2017         Recent Labs  Lab 10/13/17  0357 10/12/17  0446  10/11/17  0312 10/10/17  0401   PH 7.38 7.36 7.38 7.36   PCO2 42 43 39 46*   PO2 112* 81 81 94   HCO3 25 24 23 26   O2PER 50.0 50.0 40 40.0     A/P:  62 year old with history of AVR and ascending aortic repair in May 2016 presented with acute mental status changes found to have large moises-aortic fluid collection and multiple strokes likely septic emboli.        Neuro: Propofol and fentanyl gtt. 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  Tylenol for fevers, keep normothermic. Allow sodium to drift down. Appreciate neurology consult. Cerebral angiography yesterday normal. Minimal sedation for neuro examination. Daily CT head scans. Head CT stable thus far. Neuro checks Q2. Therapeutic hypernatremia discontinued,  Na trending down; goal down by 5 mmol/L per day until normonatremic.   Resp: minimal vent settings, Continue CMV at current settings.  CV:  OWEN revealed vegetations on ventricular leaflet of aortic valve suggestive of endocarditis. Neuro okay with ASA starting today. 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 Monitor daily EKG. Goal SBP < 140 mmHg, nicardipine added   GI/FEN: Post pyloric feeding tube in place, keep infusing at goal rate. Hypernatremia trending down goal down by 5 mmol/L per day until normonatremic.   Renal: Good urine output. Continue to monitor. Cr increased but stable (3.3).  Endo: Insulin gtt   ID:  Nafcillin 2g IV Q4 hours and Levofloxacin 750mg IV Q48 hours. Gentamicin IV daily (x14 days to 10/19/17). Daily blood cultures; need to assess for clearance.  Heme: No bleeding concerns at this time  Prophylaxis: H2 blocker, hold heparin per neuro recs  Lines: arterial line, central line, PIVs. Consider changing when cultures clear.  Dispo: CVICU      Patient seen with Dr. Duncan, staff Intensivist       Sukhwinder Mejía MD  Anesthesiology Resident   [phone]

## 2017-10-14 ENCOUNTER — APPOINTMENT (OUTPATIENT)
Dept: GENERAL RADIOLOGY | Facility: CLINIC | Age: 62
DRG: 004 | End: 2017-10-14
Attending: NURSE PRACTITIONER
Payer: COMMERCIAL

## 2017-10-14 ENCOUNTER — ANESTHESIA (OUTPATIENT)
Dept: INTENSIVE CARE | Facility: CLINIC | Age: 62
DRG: 004 | End: 2017-10-14
Payer: COMMERCIAL

## 2017-10-14 ENCOUNTER — ANESTHESIA EVENT (OUTPATIENT)
Dept: INTENSIVE CARE | Facility: CLINIC | Age: 62
DRG: 004 | End: 2017-10-14
Payer: COMMERCIAL

## 2017-10-14 LAB
ANION GAP SERPL CALCULATED.3IONS-SCNC: 5 MMOL/L (ref 3–14)
ANION GAP SERPL CALCULATED.3IONS-SCNC: 6 MMOL/L (ref 3–14)
ANION GAP SERPL CALCULATED.3IONS-SCNC: 6 MMOL/L (ref 3–14)
ANION GAP SERPL CALCULATED.3IONS-SCNC: 7 MMOL/L (ref 3–14)
ANION GAP SERPL CALCULATED.3IONS-SCNC: 8 MMOL/L (ref 3–14)
ANION GAP SERPL CALCULATED.3IONS-SCNC: 8 MMOL/L (ref 3–14)
ANION GAP SERPL CALCULATED.3IONS-SCNC: 9 MMOL/L (ref 3–14)
BACTERIA SPEC CULT: ABNORMAL
BASE EXCESS BLDA CALC-SCNC: 0.4 MMOL/L
BASE EXCESS BLDA CALC-SCNC: 0.8 MMOL/L
BUN SERPL-MCNC: 78 MG/DL (ref 7–30)
BUN SERPL-MCNC: 79 MG/DL (ref 7–30)
BUN SERPL-MCNC: 80 MG/DL (ref 7–30)
BUN SERPL-MCNC: 82 MG/DL (ref 7–30)
BUN SERPL-MCNC: 83 MG/DL (ref 7–30)
BUN SERPL-MCNC: 84 MG/DL (ref 7–30)
BUN SERPL-MCNC: 89 MG/DL (ref 7–30)
BUN SERPL-MCNC: 91 MG/DL (ref 7–30)
CALCIUM SERPL-MCNC: 7.5 MG/DL (ref 8.5–10.1)
CALCIUM SERPL-MCNC: 7.6 MG/DL (ref 8.5–10.1)
CALCIUM SERPL-MCNC: 7.7 MG/DL (ref 8.5–10.1)
CALCIUM SERPL-MCNC: 7.7 MG/DL (ref 8.5–10.1)
CALCIUM SERPL-MCNC: 7.8 MG/DL (ref 8.5–10.1)
CALCIUM SERPL-MCNC: 7.9 MG/DL (ref 8.5–10.1)
CALCIUM SERPL-MCNC: 8 MG/DL (ref 8.5–10.1)
CALCIUM SERPL-MCNC: 8 MG/DL (ref 8.5–10.1)
CHLORIDE SERPL-SCNC: 127 MMOL/L (ref 94–109)
CHLORIDE SERPL-SCNC: 130 MMOL/L (ref 94–109)
CHLORIDE SERPL-SCNC: 130 MMOL/L (ref 94–109)
CHLORIDE SERPL-SCNC: 132 MMOL/L (ref 94–109)
CHLORIDE SERPL-SCNC: 132 MMOL/L (ref 94–109)
CHLORIDE SERPL-SCNC: 133 MMOL/L (ref 94–109)
CHLORIDE SERPL-SCNC: 133 MMOL/L (ref 94–109)
CHLORIDE SERPL-SCNC: 134 MMOL/L (ref 94–109)
CHLORIDE SERPL-SCNC: 136 MMOL/L (ref 94–109)
CHLORIDE SERPL-SCNC: 136 MMOL/L (ref 94–109)
CO2 SERPL-SCNC: 23 MMOL/L (ref 20–32)
CO2 SERPL-SCNC: 24 MMOL/L (ref 20–32)
CO2 SERPL-SCNC: 24 MMOL/L (ref 20–32)
CO2 SERPL-SCNC: 25 MMOL/L (ref 20–32)
CO2 SERPL-SCNC: 26 MMOL/L (ref 20–32)
CO2 SERPL-SCNC: 27 MMOL/L (ref 20–32)
CREAT SERPL-MCNC: 3.16 MG/DL (ref 0.66–1.25)
CREAT SERPL-MCNC: 3.18 MG/DL (ref 0.66–1.25)
CREAT SERPL-MCNC: 3.19 MG/DL (ref 0.66–1.25)
CREAT SERPL-MCNC: 3.21 MG/DL (ref 0.66–1.25)
CREAT SERPL-MCNC: 3.21 MG/DL (ref 0.66–1.25)
CREAT SERPL-MCNC: 3.22 MG/DL (ref 0.66–1.25)
CREAT SERPL-MCNC: 3.23 MG/DL (ref 0.66–1.25)
CREAT SERPL-MCNC: 3.24 MG/DL (ref 0.66–1.25)
CREAT SERPL-MCNC: 3.24 MG/DL (ref 0.66–1.25)
CREAT SERPL-MCNC: 3.39 MG/DL (ref 0.66–1.25)
ERYTHROCYTE [DISTWIDTH] IN BLOOD BY AUTOMATED COUNT: 17.7 % (ref 10–15)
ERYTHROCYTE [DISTWIDTH] IN BLOOD BY AUTOMATED COUNT: 18.7 % (ref 10–15)
GENTAMICIN SERPL-MCNC: 0.6 MG/L
GFR SERPL CREATININE-BSD FRML MDRD: 18 ML/MIN/1.7M2
GFR SERPL CREATININE-BSD FRML MDRD: 19 ML/MIN/1.7M2
GFR SERPL CREATININE-BSD FRML MDRD: 20 ML/MIN/1.7M2
GLUCOSE BLDC GLUCOMTR-MCNC: 121 MG/DL (ref 70–99)
GLUCOSE BLDC GLUCOMTR-MCNC: 122 MG/DL (ref 70–99)
GLUCOSE BLDC GLUCOMTR-MCNC: 124 MG/DL (ref 70–99)
GLUCOSE BLDC GLUCOMTR-MCNC: 128 MG/DL (ref 70–99)
GLUCOSE BLDC GLUCOMTR-MCNC: 130 MG/DL (ref 70–99)
GLUCOSE BLDC GLUCOMTR-MCNC: 137 MG/DL (ref 70–99)
GLUCOSE BLDC GLUCOMTR-MCNC: 138 MG/DL (ref 70–99)
GLUCOSE BLDC GLUCOMTR-MCNC: 139 MG/DL (ref 70–99)
GLUCOSE BLDC GLUCOMTR-MCNC: 140 MG/DL (ref 70–99)
GLUCOSE BLDC GLUCOMTR-MCNC: 141 MG/DL (ref 70–99)
GLUCOSE BLDC GLUCOMTR-MCNC: 149 MG/DL (ref 70–99)
GLUCOSE BLDC GLUCOMTR-MCNC: 150 MG/DL (ref 70–99)
GLUCOSE BLDC GLUCOMTR-MCNC: 152 MG/DL (ref 70–99)
GLUCOSE BLDC GLUCOMTR-MCNC: 157 MG/DL (ref 70–99)
GLUCOSE BLDC GLUCOMTR-MCNC: 172 MG/DL (ref 70–99)
GLUCOSE BLDC GLUCOMTR-MCNC: 175 MG/DL (ref 70–99)
GLUCOSE BLDC GLUCOMTR-MCNC: 178 MG/DL (ref 70–99)
GLUCOSE BLDC GLUCOMTR-MCNC: 188 MG/DL (ref 70–99)
GLUCOSE SERPL-MCNC: 131 MG/DL (ref 70–99)
GLUCOSE SERPL-MCNC: 134 MG/DL (ref 70–99)
GLUCOSE SERPL-MCNC: 135 MG/DL (ref 70–99)
GLUCOSE SERPL-MCNC: 137 MG/DL (ref 70–99)
GLUCOSE SERPL-MCNC: 139 MG/DL (ref 70–99)
GLUCOSE SERPL-MCNC: 147 MG/DL (ref 70–99)
GLUCOSE SERPL-MCNC: 158 MG/DL (ref 70–99)
GLUCOSE SERPL-MCNC: 163 MG/DL (ref 70–99)
GLUCOSE SERPL-MCNC: 173 MG/DL (ref 70–99)
GLUCOSE SERPL-MCNC: 175 MG/DL (ref 70–99)
GLUCOSE SERPL-MCNC: 182 MG/DL (ref 70–99)
GLUCOSE SERPL-MCNC: 182 MG/DL (ref 70–99)
HCO3 BLD-SCNC: 24 MMOL/L (ref 21–28)
HCO3 BLD-SCNC: 25 MMOL/L (ref 21–28)
HCT VFR BLD AUTO: 34.4 % (ref 40–53)
HCT VFR BLD AUTO: 34.9 % (ref 40–53)
HGB BLD-MCNC: 10.9 G/DL (ref 13.3–17.7)
HGB BLD-MCNC: 11 G/DL (ref 13.3–17.7)
LACTATE BLD-SCNC: 1.3 MMOL/L (ref 0.7–2)
MAGNESIUM SERPL-MCNC: 2.5 MG/DL (ref 1.6–2.3)
MAGNESIUM SERPL-MCNC: 2.7 MG/DL (ref 1.6–2.3)
MAGNESIUM SERPL-MCNC: 2.7 MG/DL (ref 1.6–2.3)
MCH RBC QN AUTO: 29.4 PG (ref 26.5–33)
MCH RBC QN AUTO: 29.8 PG (ref 26.5–33)
MCHC RBC AUTO-ENTMCNC: 31.5 G/DL (ref 31.5–36.5)
MCHC RBC AUTO-ENTMCNC: 31.7 G/DL (ref 31.5–36.5)
MCV RBC AUTO: 93 FL (ref 78–100)
MCV RBC AUTO: 94 FL (ref 78–100)
O2/TOTAL GAS SETTING VFR VENT: 50 %
O2/TOTAL GAS SETTING VFR VENT: 50 %
OSMOLALITY SERPL: 366 MMOL/KG (ref 280–301)
OSMOLALITY SERPL: 368 MMOL/KG (ref 280–301)
OSMOLALITY SERPL: 375 MMOL/KG (ref 280–301)
OSMOLALITY SERPL: 376 MMOL/KG (ref 280–301)
PCO2 BLD: 32 MM HG (ref 35–45)
PCO2 BLD: 42 MM HG (ref 35–45)
PH BLD: 7.39 PH (ref 7.35–7.45)
PH BLD: 7.48 PH (ref 7.35–7.45)
PHOSPHATE SERPL-MCNC: 2.6 MG/DL (ref 2.5–4.5)
PHOSPHATE SERPL-MCNC: 3.3 MG/DL (ref 2.5–4.5)
PLATELET # BLD AUTO: 293 10E9/L (ref 150–450)
PLATELET # BLD AUTO: 304 10E9/L (ref 150–450)
PO2 BLD: 105 MM HG (ref 80–105)
PO2 BLD: 75 MM HG (ref 80–105)
POTASSIUM SERPL-SCNC: 3.4 MMOL/L (ref 3.4–5.3)
POTASSIUM SERPL-SCNC: 3.6 MMOL/L (ref 3.4–5.3)
POTASSIUM SERPL-SCNC: 3.8 MMOL/L (ref 3.4–5.3)
POTASSIUM SERPL-SCNC: 3.9 MMOL/L (ref 3.4–5.3)
POTASSIUM SERPL-SCNC: 4 MMOL/L (ref 3.4–5.3)
POTASSIUM SERPL-SCNC: 4 MMOL/L (ref 3.4–5.3)
POTASSIUM SERPL-SCNC: 4.1 MMOL/L (ref 3.4–5.3)
POTASSIUM SERPL-SCNC: 4.2 MMOL/L (ref 3.4–5.3)
POTASSIUM SERPL-SCNC: 4.2 MMOL/L (ref 3.4–5.3)
POTASSIUM SERPL-SCNC: 4.3 MMOL/L (ref 3.4–5.3)
RBC # BLD AUTO: 3.66 10E12/L (ref 4.4–5.9)
RBC # BLD AUTO: 3.74 10E12/L (ref 4.4–5.9)
SODIUM BLD-SCNC: 165 MMOL/L (ref 133–144)
SODIUM SERPL-SCNC: 161 MMOL/L (ref 133–144)
SODIUM SERPL-SCNC: 162 MMOL/L (ref 133–144)
SODIUM SERPL-SCNC: 163 MMOL/L (ref 133–144)
SODIUM SERPL-SCNC: 164 MMOL/L (ref 133–144)
SODIUM SERPL-SCNC: 164 MMOL/L (ref 133–144)
SODIUM SERPL-SCNC: 165 MMOL/L (ref 133–144)
SODIUM SERPL-SCNC: 166 MMOL/L (ref 133–144)
SODIUM SERPL-SCNC: 166 MMOL/L (ref 133–144)
SODIUM SERPL-SCNC: 167 MMOL/L (ref 133–144)
SODIUM SERPL-SCNC: 168 MMOL/L (ref 133–144)
SPECIMEN SOURCE: ABNORMAL
WBC # BLD AUTO: 19.7 10E9/L (ref 4–11)
WBC # BLD AUTO: 21.2 10E9/L (ref 4–11)

## 2017-10-14 PROCEDURE — 25000125 ZZHC RX 250: Performed by: ANESTHESIOLOGY

## 2017-10-14 PROCEDURE — S0032 INJECTION, NAFCILLIN SODIUM: HCPCS | Performed by: THORACIC SURGERY (CARDIOTHORACIC VASCULAR SURGERY)

## 2017-10-14 PROCEDURE — 40000275 ZZH STATISTIC RCP TIME EA 10 MIN

## 2017-10-14 PROCEDURE — 83930 ASSAY OF BLOOD OSMOLALITY: CPT | Performed by: STUDENT IN AN ORGANIZED HEALTH CARE EDUCATION/TRAINING PROGRAM

## 2017-10-14 PROCEDURE — 74000 XR ABDOMEN PORT F1 VW: CPT

## 2017-10-14 PROCEDURE — 25000128 H RX IP 250 OP 636: Performed by: THORACIC SURGERY (CARDIOTHORACIC VASCULAR SURGERY)

## 2017-10-14 PROCEDURE — 80048 BASIC METABOLIC PNL TOTAL CA: CPT | Performed by: STUDENT IN AN ORGANIZED HEALTH CARE EDUCATION/TRAINING PROGRAM

## 2017-10-14 PROCEDURE — 80170 ASSAY OF GENTAMICIN: CPT | Performed by: STUDENT IN AN ORGANIZED HEALTH CARE EDUCATION/TRAINING PROGRAM

## 2017-10-14 PROCEDURE — 82803 BLOOD GASES ANY COMBINATION: CPT | Performed by: THORACIC SURGERY (CARDIOTHORACIC VASCULAR SURGERY)

## 2017-10-14 PROCEDURE — 25000132 ZZH RX MED GY IP 250 OP 250 PS 637: Performed by: THORACIC SURGERY (CARDIOTHORACIC VASCULAR SURGERY)

## 2017-10-14 PROCEDURE — 25000132 ZZH RX MED GY IP 250 OP 250 PS 637: Performed by: SURGERY

## 2017-10-14 PROCEDURE — 27210437 ZZH NUTRITION PRODUCT SEMIELEM INTERMED LITER

## 2017-10-14 PROCEDURE — 25000125 ZZHC RX 250: Performed by: THORACIC SURGERY (CARDIOTHORACIC VASCULAR SURGERY)

## 2017-10-14 PROCEDURE — 25000132 ZZH RX MED GY IP 250 OP 250 PS 637: Performed by: STUDENT IN AN ORGANIZED HEALTH CARE EDUCATION/TRAINING PROGRAM

## 2017-10-14 PROCEDURE — S0028 INJECTION, FAMOTIDINE, 20 MG: HCPCS | Performed by: THORACIC SURGERY (CARDIOTHORACIC VASCULAR SURGERY)

## 2017-10-14 PROCEDURE — 82803 BLOOD GASES ANY COMBINATION: CPT | Performed by: ORTHOPAEDIC SURGERY

## 2017-10-14 PROCEDURE — 25000125 ZZHC RX 250: Performed by: STUDENT IN AN ORGANIZED HEALTH CARE EDUCATION/TRAINING PROGRAM

## 2017-10-14 PROCEDURE — 84100 ASSAY OF PHOSPHORUS: CPT | Performed by: STUDENT IN AN ORGANIZED HEALTH CARE EDUCATION/TRAINING PROGRAM

## 2017-10-14 PROCEDURE — 87040 BLOOD CULTURE FOR BACTERIA: CPT | Performed by: STUDENT IN AN ORGANIZED HEALTH CARE EDUCATION/TRAINING PROGRAM

## 2017-10-14 PROCEDURE — 25000128 H RX IP 250 OP 636

## 2017-10-14 PROCEDURE — 40000014 ZZH STATISTIC ARTERIAL MONITORING DAILY

## 2017-10-14 PROCEDURE — 99291 CRITICAL CARE FIRST HOUR: CPT | Mod: GC | Performed by: INTERNAL MEDICINE

## 2017-10-14 PROCEDURE — 83605 ASSAY OF LACTIC ACID: CPT | Performed by: THORACIC SURGERY (CARDIOTHORACIC VASCULAR SURGERY)

## 2017-10-14 PROCEDURE — 25000128 H RX IP 250 OP 636: Performed by: STUDENT IN AN ORGANIZED HEALTH CARE EDUCATION/TRAINING PROGRAM

## 2017-10-14 PROCEDURE — 40000986 XR CHEST PORT 1 VW

## 2017-10-14 PROCEDURE — 93010 ELECTROCARDIOGRAM REPORT: CPT | Performed by: INTERNAL MEDICINE

## 2017-10-14 PROCEDURE — 71010 XR CHEST PORT 1 VW: CPT

## 2017-10-14 PROCEDURE — 93005 ELECTROCARDIOGRAM TRACING: CPT

## 2017-10-14 PROCEDURE — 25000128 H RX IP 250 OP 636: Performed by: ANESTHESIOLOGY

## 2017-10-14 PROCEDURE — 85027 COMPLETE CBC AUTOMATED: CPT | Performed by: STUDENT IN AN ORGANIZED HEALTH CARE EDUCATION/TRAINING PROGRAM

## 2017-10-14 PROCEDURE — 83735 ASSAY OF MAGNESIUM: CPT | Performed by: STUDENT IN AN ORGANIZED HEALTH CARE EDUCATION/TRAINING PROGRAM

## 2017-10-14 PROCEDURE — 40000196 ZZH STATISTIC RAPCV CVP MONITORING

## 2017-10-14 PROCEDURE — 20000004 ZZH R&B ICU UMMC

## 2017-10-14 PROCEDURE — 94003 VENT MGMT INPAT SUBQ DAY: CPT

## 2017-10-14 PROCEDURE — 00000146 ZZHCL STATISTIC GLUCOSE BY METER IP

## 2017-10-14 RX ORDER — GENTAMICIN SULFATE 80 MG/100ML
80 INJECTION, SOLUTION INTRAVENOUS
Status: DISCONTINUED | OUTPATIENT
Start: 2017-10-14 | End: 2017-10-16

## 2017-10-14 RX ORDER — ETOMIDATE 2 MG/ML
INJECTION INTRAVENOUS PRN
Status: DISCONTINUED | OUTPATIENT
Start: 2017-10-14 | End: 2017-10-28

## 2017-10-14 RX ORDER — NYSTATIN 100000/ML
500000 SUSPENSION, ORAL (FINAL DOSE FORM) ORAL EVERY 6 HOURS
Status: DISCONTINUED | OUTPATIENT
Start: 2017-10-14 | End: 2017-11-04 | Stop reason: HOSPADM

## 2017-10-14 RX ORDER — FUROSEMIDE 10 MG/ML
40 INJECTION INTRAMUSCULAR; INTRAVENOUS
Status: COMPLETED | OUTPATIENT
Start: 2017-10-14 | End: 2017-10-14

## 2017-10-14 RX ORDER — DEXMEDETOMIDINE HYDROCHLORIDE 4 UG/ML
0.2-0.7 INJECTION, SOLUTION INTRAVENOUS CONTINUOUS
Status: DISCONTINUED | OUTPATIENT
Start: 2017-10-14 | End: 2017-11-02

## 2017-10-14 RX ORDER — SODIUM CHLORIDE, SODIUM LACTATE, POTASSIUM CHLORIDE, CALCIUM CHLORIDE 600; 310; 30; 20 MG/100ML; MG/100ML; MG/100ML; MG/100ML
INJECTION, SOLUTION INTRAVENOUS
Status: COMPLETED
Start: 2017-10-14 | End: 2017-10-14

## 2017-10-14 RX ADMIN — ACETAMINOPHEN 650 MG: 325 SOLUTION ORAL at 12:04

## 2017-10-14 RX ADMIN — ACETAMINOPHEN 650 MG: 325 SOLUTION ORAL at 00:16

## 2017-10-14 RX ADMIN — POTASSIUM CHLORIDE 20 MEQ: 1.5 POWDER, FOR SOLUTION ORAL at 18:03

## 2017-10-14 RX ADMIN — HUMAN INSULIN 4 UNITS/HR: 100 INJECTION, SOLUTION SUBCUTANEOUS at 04:04

## 2017-10-14 RX ADMIN — FUROSEMIDE 40 MG: 10 INJECTION, SOLUTION INTRAVENOUS at 16:39

## 2017-10-14 RX ADMIN — NAFCILLIN SODIUM 2 G: 2 INJECTION, POWDER, LYOPHILIZED, FOR SOLUTION INTRAMUSCULAR; INTRAVENOUS at 18:03

## 2017-10-14 RX ADMIN — NAFCILLIN SODIUM 2 G: 2 INJECTION, POWDER, LYOPHILIZED, FOR SOLUTION INTRAMUSCULAR; INTRAVENOUS at 23:04

## 2017-10-14 RX ADMIN — FUROSEMIDE 40 MG: 10 INJECTION, SOLUTION INTRAVENOUS at 10:46

## 2017-10-14 RX ADMIN — NAFCILLIN SODIUM 2 G: 2 INJECTION, POWDER, LYOPHILIZED, FOR SOLUTION INTRAMUSCULAR; INTRAVENOUS at 14:15

## 2017-10-14 RX ADMIN — ETOMIDATE 16 MG: 2 INJECTION INTRAVENOUS at 19:13

## 2017-10-14 RX ADMIN — NAFCILLIN SODIUM 2 G: 2 INJECTION, POWDER, LYOPHILIZED, FOR SOLUTION INTRAMUSCULAR; INTRAVENOUS at 06:57

## 2017-10-14 RX ADMIN — ASPIRIN 81 MG CHEWABLE TABLET 81 MG: 81 TABLET CHEWABLE at 08:30

## 2017-10-14 RX ADMIN — FENTANYL CITRATE 100 MCG/HR: 50 INJECTION, SOLUTION INTRAMUSCULAR; INTRAVENOUS at 08:30

## 2017-10-14 RX ADMIN — ROCURONIUM BROMIDE 100 MG: 10 INJECTION INTRAVENOUS at 19:13

## 2017-10-14 RX ADMIN — NAFCILLIN SODIUM 2 G: 2 INJECTION, POWDER, LYOPHILIZED, FOR SOLUTION INTRAMUSCULAR; INTRAVENOUS at 02:56

## 2017-10-14 RX ADMIN — DEXMEDETOMIDINE HYDROCHLORIDE 0.5 MCG/KG/HR: 4 INJECTION, SOLUTION INTRAVENOUS at 10:24

## 2017-10-14 RX ADMIN — SODIUM CHLORIDE, POTASSIUM CHLORIDE, SODIUM LACTATE AND CALCIUM CHLORIDE 1000 ML: 600; 310; 30; 20 INJECTION, SOLUTION INTRAVENOUS at 21:15

## 2017-10-14 RX ADMIN — NYSTATIN 500000 UNITS: 100000 SUSPENSION ORAL at 12:04

## 2017-10-14 RX ADMIN — NYSTATIN 500000 UNITS: 100000 SUSPENSION ORAL at 18:03

## 2017-10-14 RX ADMIN — POTASSIUM CHLORIDE 20 MEQ: 1.5 POWDER, FOR SOLUTION ORAL at 20:24

## 2017-10-14 RX ADMIN — LEVOFLOXACIN 750 MG: 5 INJECTION, SOLUTION INTRAVENOUS at 10:25

## 2017-10-14 RX ADMIN — GENTAMICIN SULFATE 80 MG: 80 INJECTION, SOLUTION INTRAVENOUS at 21:34

## 2017-10-14 RX ADMIN — MULTIVITAMIN 15 ML: LIQUID ORAL at 08:31

## 2017-10-14 RX ADMIN — FENTANYL CITRATE 100 MCG: 50 INJECTION, SOLUTION INTRAMUSCULAR; INTRAVENOUS at 19:44

## 2017-10-14 RX ADMIN — HUMAN INSULIN 3 UNITS/HR: 100 INJECTION, SOLUTION SUBCUTANEOUS at 20:24

## 2017-10-14 RX ADMIN — NAFCILLIN SODIUM 2 G: 2 INJECTION, POWDER, LYOPHILIZED, FOR SOLUTION INTRAMUSCULAR; INTRAVENOUS at 12:05

## 2017-10-14 RX ADMIN — HUMAN INSULIN 3 UNITS/HR: 100 INJECTION, SOLUTION SUBCUTANEOUS at 14:08

## 2017-10-14 RX ADMIN — FAMOTIDINE 20 MG: 10 INJECTION, SOLUTION INTRAVENOUS at 10:25

## 2017-10-14 ASSESSMENT — VISUAL ACUITY
OU: OTHER (SEE COMMENT)

## 2017-10-14 NOTE — PROGRESS NOTES
CVTS Progress Note     Dissection of aorta, unspecified portion of aorta (H)  Sepsis due to other etiology (H)  NSTEMI (non-ST elevated myocardial infarction) (H)  Hypotension, unspecified hypotension type  S/P AVR (aortic valve replacement)    Subjective: no acute events, continues to be intermittently febrile    Objective:  Temp:  [100.9  F (38.3  C)-102.2  F (39  C)] 101.3  F (38.5  C)  Heart Rate:  [] 105  Resp:  [16-23] 18  BP: (114-175)/(64-98) 136/64  MAP:  [59 mmHg-120 mmHg] 81 mmHg  Arterial Line BP: ()/(46-94) 122/64  FiO2 (%):  [50 %] 50 %  SpO2:  [93 %-99 %] 97 %    Ventilation Mode: CMV/AC  FiO2 (%): 50 %  Rate Set (breaths/minute): 16 breaths/min  Tidal Volume Set (mL): 500 mL  PEEP (cm H2O): 5 cmH2O  Oxygen Concentration (%): 50 %  Resp: 18    I/O last 3 completed shifts:  In: 5096.02 [I.V.:1391.02; NG/GT:2365]  Out: 3385 [Urine:3385]    PE:  General: Intubated, lightly sedated  Neck: Supple, no tracheal deviation  Cardiovascular: RRR  Pumonary: Non labored breathing on MV  Abdomen: Soft, non distended, non tender  Ext: minimal edema, warm  Neuro: moving all extremities spontaneously    BMP    Recent Labs  Lab 10/14/17  0813 10/14/17  0624 10/14/17  0404 10/14/17  0201  10/12/17  0446  10/11/17  0312  10/10/17  0401  10/09/17  1529   * 164* 168* 167*  < > 167*  167*  < > 162*  < > 151*  < > 146*   POTASSIUM 4.2 4.1 4.1 4.0  < > 4.2  --  3.8  --  4.4  --  4.3   CHLORIDE 134* 134* 136* 136*  < > 134*  >125*  --  130*  --  118*  --  113*   CO2 26 25 25 24  < > 24  --  24  --  25  --  25   BUN 79* 83* 78* 80*  < > 74*  --  60*  --  44*  --  32*   CR 3.23* 3.16* 3.21* 3.24*  < > 3.31*  --  3.35*  --  3.06*  --  2.40*   * 137* 131* 163*  < > 209*  --  201*  --  162*  --  154*   MAG  --   --  2.7*  --   --   --   --  3.1*  --  2.6*  --  2.5*   PHOS  --   --  3.3  --   --  3.1  --  3.6  --  4.8*  --   --    < > = values in this interval not displayed.  CBC    Recent Labs  Lab  10/14/17  0404 10/13/17  0357 10/12/17  0446 10/11/17  0312   WBC 19.7* 15.7* 11.5* 13.4*   HGB 11.0* 10.7* 12.0* 13.4    322 266 238     INR/PTT    Recent Labs  Lab 10/08/17  0328   INR 1.03     ABG    Recent Labs  Lab 10/14/17  0404 10/13/17  0357 10/12/17  0446 10/11/17  0312   PH 7.39 7.38 7.36 7.38   PCO2 42 42 43 39   PO2 105 112* 81 81   HCO3 25 25 24 23     LFT  No lab results found in last 7 days.    A/P   62 year old with history of AVR and ascending aortic repair in May 2016 presented on 10/6 with acute mental status changes found to have large moises-aortic fluid collection and multiple strokes likely septic emboli.    Neuro: tylenol for fevers,continue neuro checks Q2H, change sedation to precedex and wake up as able  Resp: inability to protect airway requiring mechanical ventilation, on minimal vent settings, could wean to extubate if mental status improves   CV: goal normotension per neurology/neurosurgery  GI/FEN: tube feeds to goal, increase free water for hypernatremia  Renal: JOSE non-oliguric, clinically euvolemic   Endo: Insulin gtt for glucose control  ID: continue current antibiotic regimen pending ID recommendations, daily blood cultures negative since 10/12  Heme: no bleeding concerns at this time, hold anticoagulation per neuro and neurosurgery  Prophylaxis: H2 blocker, no chemical dvt ppx due to evolving stroke  Lines: d/c central line and art line, obtain PICC  Dispo: CV-ICU    Abdoulaye Andres MD  Surgery PGY-3

## 2017-10-14 NOTE — PROGRESS NOTES
CV ICU   Progress Note:    S/Interval History: Left oculocephalic reflex absent overnight, repeat stat Head CT unchanged.      O:  Temp: 102.4  F (39.1  C) Temp  Min: 100.9  F (38.3  C)  Max: 102.4  F (39.1  C)  Resp: 26 Resp  Min: 16  Max: 26  SpO2: 97 % SpO2  Min: 93 %  Max: 99 %    No Data Recorded  Heart Rate: 106 Heart Rate  Min: 92  Max: 117  BP: 140/87 Systolic (24hrs), Av , Min:114 , Max:164   Diastolic (24hrs), Av, Min:63, Max:95    Ventilation Mode: CMV/AC  FiO2 (%): 50 %  Rate Set (breaths/minute): 16 breaths/min  Tidal Volume Set (mL): 500 mL  PEEP (cm H2O): 5 cmH2O  Oxygen Concentration (%): 50 %  Resp: 26  Date 10/14/17 0700 - 10/15/17 0659   Shift 6910-3385 9368-2404 3214-6835 24 Hour Total   I  N  T  A  K  E   I.V. 561.9   561.9    NG/   275    Enteral 360   360    Shift Total  (mL/kg) 1196.9  (12.94)   1196.9  (12.94)   O  U  T  P  U  T   Urine 1765   1765    Shift Total  (mL/kg) 1765  (19.08)   1765  (19.08)   Weight (kg) 92.5 92.5 92.5 92.5       Physical Exam    General: Intubated, mechanically ventilated, non responsive   Neck: Supple, no tracheal deviation  Cardiovascular: RRR  Pumonary: Non labored breathing on MV. Mild crackles bilaterally   Abdomen: Soft, non distended, non tender.   Ext: No peripheral edema, appropriate distal perfusion   Neuro: Not moving right upper and lower extremity     Lab Results   Component Value Date    WBC 19.7 (H) 10/14/2017    HGB 11.0 (L) 10/14/2017    HCT 34.9 (L) 10/14/2017     10/14/2017     (HH) 10/14/2017    POTASSIUM 4.3 10/14/2017    CHLORIDE 134 (H) 10/14/2017    CO2 25 10/14/2017    BUN 82 (H) 10/14/2017    CR 3.23 (H) 10/14/2017     (H) 10/14/2017    SED 6 2013    DD 1.7 (H) 2016    NTBNPI 2768 (H) 2016    NTBNP 1302 (H) 2016    TROPONIN <0.07 2005    TROPI 0.634 (HH) 10/08/2017    AST 24 10/06/2017    ALT 33 10/06/2017    ALKPHOS 85 10/06/2017    BILITOTAL 1.5 (H) 10/06/2017    INR  1.03 10/08/2017         Recent Labs  Lab 10/14/17  0404 10/13/17  0357 10/12/17  0446 10/11/17  0312   PH 7.39 7.38 7.36 7.38   PCO2 42 42 43 39   PO2 105 112* 81 81   HCO3 25 25 24 23   O2PER 50.0 50.0 50.0 40       A/P: 62 year old with history of AVR and ascending aortic repair in May 2016 presented with acute mental status changes found to have large moises-aortic fluid collection and multiple strokes likely septic emboli.        Neuro: Discontinue Propofol and fentanyl gtt, start precedex. 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  Tylenol for fevers, keep normothermic. Allow sodium to drift down. Appreciate neurology consult. Cerebral angiography yesterday normal. Minimal sedation for neuro examination. Daily CT head scans. Head CT stable thus far. Neuro checks Q2. Therapeutic hypernatremia discontinued,  Na trending down; goal down by 5 mmol/L per day until normonatremic.   Resp: minimal vent settings, Continue CMV at current settings. Mild to moderate pulmonary edema per today's CXR  CV:  OWEN revealed vegetations on ventricular leaflet of aortic valve suggestive of endocarditis. Neuro okay with ASA starting today. 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 Monitor daily EKG. Goal SBP < 140 mmHg, nicardipine added   GI/FEN: Post pyloric feeding tube in place, keep infusing at goal rate. Hypernatremia trending down goal down by 5 mmol/L per day until normonatremic.   Renal: Good urine output. Continue to monitor. Cr progressively decreasing (3.1 today from 3.3 yesterday). Lasix 40 mg BID today (pulmonary edema)   Endo: Insulin gtt   ID:  Nafcillin 2g IV Q4 hours and Levofloxacin 750mg IV Q48 hours. Gentamicin IV daily (x14 days to 10/19/17). Daily blood cultures; need to assess for clearance. Nystatin (oral candidiasis)  Heme: No bleeding concerns at this time  Prophylaxis: H2 blocker, hold heparin per neuro recs  Lines: Remove the arterial line  and central line as the blood cultures have been clear for 2 days.    Dispo: CVICU      Patient seen with Dr. Duncan, staff Intensivist        Sukhwinder Mejía MD  Anesthesiology Resident   [phone]

## 2017-10-14 NOTE — PROGRESS NOTES
Neuroscience Intensive Care Progress Note    10/14/2017      24 hour events:  Pt is off sedation, Small sluggishly reactive pupil bilaterally approximately 2mm - Left oculocephalic & LT Corneal abscent with mild intermittent nystagmus to the RT side      24 Hour Vital Signs Summary:  Temperatures:  Current - Temp: 101.1  F (38.4  C); Max - Temp  Av.8  F (38.2  C)  Min: 99.9  F (37.7  C)  Max: 101.5  F (38.6  C)  Respiration range: Resp  Av.4  Min: 16  Max: 20  Pulse range: No Data Recorded  Blood pressure range: Systolic (24hrs), Av , Min:94 , Max:175   ; Diastolic (24hrs), Av, Min:57, Max:98    Pulse oximetry range: SpO2  Av.2 %  Min: 93 %  Max: 97 %    Ventilator Settings  Ventilation Mode: CMV/AC  FiO2 (%): 50 %  Rate Set (breaths/minute): 16 breaths/min  Tidal Volume Set (mL): 500 mL  PEEP (cm H2O): 5 cmH2O  Oxygen Concentration (%): 50 %  Resp: 27      Intake/Output Summary (Last 24 hours) at 10/13/17 1808  Last data filed at 10/13/17 1700   Gross per 24 hour   Intake          3939.68 ml   Output             3025 ml   Net           914.68 ml       Arterial Line BP: ()/(42-77) 128/60  MAP:  [52 mmHg-99 mmHg] 79 mmHg  BP - Mean:  [] 115  CVP:  [3 mmHg-6 mmHg] 3 mmHg    Current Medications:    nystatin  500,000 Units Swish & Spit Q6H     gentamicin  80 mg Intravenous Q36H     aspirin  81 mg Oral or Feeding Tube Daily     nafcillin  2 g Intravenous Q4H     polyethylene glycol  17 g Oral Daily     levofloxacin  750 mg Intravenous Q48H     famotidine  20 mg Intravenous Q24H     [START ON 10/15/2017] influenza quadrivalent (PF) vacc age 3 yrs and older  0.5 mL Intramuscular Prior to discharge     [START ON 10/15/2017] pneumococcal vaccine  0.5 mL Intramuscular Prior to discharge     sodium chloride (PF)  3 mL Intravenous Q8H     multivitamins with minerals  15 mL Per Feeding Tube Daily     senna-docusate  1-2 tablet Oral or Feeding Tube BID     sodium chloride (PF)  3 mL  "Intracatheter Q8H       PRN Medications:  lidocaine BUFFERED 1 %, ondansetron **OR** ondansetron, hydrALAZINE, sodium chloride (PF), - MEDICATION INSTRUCTIONS -, acetaminophen, prochlorperazine **OR** prochlorperazine, naloxone, IV fluid REPLACEMENT ONLY, glucose **OR** dextrose **OR** glucagon, lidocaine (buffered or not buffered), lidocaine 4%, Reason beta blocker order not selected, insulin aspart, albuterol, albuterol, fentaNYL, potassium chloride, potassium chloride, potassium chloride, potassium chloride with lidocaine, potassium chloride, magnesium sulfate, magnesium sulfate, potassium phosphate (KPHOS) in D5W IV, potassium phosphate (KPHOS) in D5W IV, potassium phosphate (KPHOS) in D5W IV, potassium phosphate (KPHOS) in D5W IV, potassium phosphate (KPHOS) in D5W IV, diphenhydrAMINE **OR** diphenhydrAMINE    Infusions:    dexmedetomidine 0.3 mcg/kg/hr (10/14/17 1225)     niCARdipine (CARDENE) infusion ADULT/PEDS GREATER than or EQUAL to 45 kg max conc Stopped (10/11/17 1014)     - MEDICATION INSTRUCTIONS -       propofol (DIPRIVAN) infusion Stopped (10/14/17 1041)     IV fluid REPLACEMENT ONLY       insulin (regular) 5.5 Units/hr (10/14/17 1639)     Reason beta blocker order not selected         Allergies   Allergen Reactions     Lisinopril Swelling     One-sided facial swelling after being on lisinopril/HCTZ for one week.        Physical Examination:  /90  Pulse 82  Temp 102.2  F (39  C)  Resp 27  Ht 1.727 m (5' 8\")  Wt 92.5 kg (203 lb 14.8 oz)  SpO2 98%  BMI 31.01 kg/m2  Intubated, on sedation. 2mm sluggish pupil - no corneal response on left - brisk response on right. Left oculocephalic absent. Not withdrawing with painful stimuli.        Labs/Studies:  Recent Labs   Lab Test  10/13/17   1553  10/13/17   1406  10/13/17   1204   10/13/17   0357   10/12/17   0446   10/11/17   0312   NA  165*  167*  166*   < >  169*  169*   < >  167*  167*   < >  162*   POTASSIUM  4.1  4.2  4.5   < >  4.5   < > "  4.2   --   3.8   CHLORIDE  133*  135*  134*   < >  136*   < >  134*  >125*   --   130*   CO2  27  27  26   < >  26   < >  24   --   24   ANIONGAP  5  6  6   < >  7   < >  9   --   8   GLC  155*  188*  176*   < >  183*   < >  209*   --   201*   BUN  79*  81*  82*   < >  81*   < >  74*   --   60*   CR  3.20*  3.22*  3.16*   < >  3.31*   < >  3.31*   --   3.35*   IZABELLA  7.9*  7.8*  7.7*   < >  7.8*   < >  7.8*   --   8.1*   WBC   --    --    --    --   15.7*   --   11.5*   --   13.4*   RBC   --    --    --    --   3.62*   --   4.08*   --   4.45   HGB   --    --    --    --   10.7*   --   12.0*   --   13.4   PLT   --    --    --    --   322   --   266   --   238    < > = values in this interval not displayed.       Recent Labs   Lab Test  10/08/17   0328  10/06/17   1322  10/06/17   1020  05/18/16   0435   INR  1.03  1.25*  1.23*  1.28*   PTT   --   32  32  35           Recent Labs  Lab 10/14/17  0404 10/13/17  0357 10/12/17  0446 10/11/17  0312   PH 7.39 7.38 7.36 7.38   PCO2 42 42 43 39   PO2 105 112* 81 81   HCO3 25 25 24 23   O2PER 50.0 50.0 50.0 40           Imaging:  Reviewed     Assessment/Plan    - Exam changes noted above  - Repeat CT with no significant change  - ASA 81 mg   - minimal sedation to assess neurologic exam  - hypertonic saline off, Na 168 - can allow to slowly drift down - given exam change, avoid sudden drop in Na.  - Cerebral Angiogram normal  - will continue to follow      Plan discussed with Dr. Miller.      Sherief Boss  Neurocritical care Fellow  Pager [phone]

## 2017-10-14 NOTE — PHARMACY-AMINOGLYCOSIDE DOSING SERVICE
Pharmacy Aminoglycoside Follow-Up Note  Date of Service 2017  Patient's  1955   62 year old male    Weight (Adjusted): 78.2 kg    Indication: Endocarditis  Current Gentamicin regimen:  Intermittent, 80mg given 10/13/17 @ 09:30  Day of therapy: 9    Target goals based on synergy dosing  Goal Peak level: 3-5 mg/L  Goal Trough level: <1 mg/L    Current estimated CrCl: Estimated Creatinine Clearance: 25.5 mL/min (based on Cr of 3.23).    Creatinine for last 3 days  10/12/2017:  4:46 AM Creatinine 3.31 mg/dL;  3:35 PM Creatinine 3.27 mg/dL;  5:48 PM Creatinine 3.29 mg/dL; 10:21 PM Creatinine 3.24 mg/dL  10/13/2017:  3:57 AM Creatinine 3.31 mg/dL;  7:55 AM Creatinine 3.23 mg/dL;  9:55 AM Creatinine 3.30 mg/dL; 12:04 PM Creatinine 3.16 mg/dL;  2:06 PM Creatinine 3.22 mg/dL;  3:53 PM Creatinine 3.20 mg/dL;  7:52 PM Creatinine 3.13 mg/dL;  9:50 PM Creatinine 3.18 mg/dL; 11:59 PM Creatinine 3.18 mg/dL  10/14/2017:  2:01 AM Creatinine 3.24 mg/dL;  4:04 AM Creatinine 3.21 mg/dL;  6:24 AM Creatinine 3.16 mg/dL;  8:13 AM Creatinine 3.23 mg/dL; 10:27 AM Creatinine 3.23 mg/dL; 12:06 PM Creatinine 3.19 mg/dL;  2:07 PM Creatinine 3.23 mg/dL    Nephrotoxins and other renal medications (Future)    Start     Dose/Rate Route Frequency Ordered Stop    10/12/17 1800  nafcillin IV 2 g vial to attach to  ml bag      2 g  over 1 Hours Intravenous EVERY 4 HOURS 10/12/17 1419      10/11/17 1311  gentamicin (GARAMYCIN) place vega - receiving intermittent dosing      1 each Does not apply SEE ADMIN INSTRUCTIONS 10/11/17 1311          Contrast Orders - past 72 hours     None        Aminoglycoside Levels - past 2 days  10/13/2017:  7:55 AM Gentamicin Level 0.9 mg/L;  2:06 PM Gentamicin Level 3.0 mg/L  10/14/2017:  2:07 PM Gentamicin Level 0.6 mg/L    Aminoglycosides IV Administrations (past 72 hours)                   gentamicin (GARAMYCIN) infusion 80 mg (mg) 80 mg New Bag 10/13/17 0936              Pharmacokinetic  Analysis  Calculated Peak level: 3.8 mg/L  Calculated Trough level: 0.36 mg/L  Volume of distribution: 0.29 L/kg  Half-life: 10.3 hours      Interpretation of levels and current regimen:  Aminoglycoside levels are within goal range    Has serum creatinine changed greater than 50% in the last 72 hours: No    Urine output:  good urine output    Renal function: Worsened from baseline but relatively stable    Plan  1. Change to scheduled dosing at 80mg IV gentamicin q36h    2.  Method of evaluation: 2 post dose levels    3. Pharmacy will continue to follow and check levels  as appropriate in 3-5 Days    Greta Cardozo, JavedD

## 2017-10-15 LAB
ANION GAP SERPL CALCULATED.3IONS-SCNC: 10 MMOL/L (ref 3–14)
ANION GAP SERPL CALCULATED.3IONS-SCNC: 4 MMOL/L (ref 3–14)
ANION GAP SERPL CALCULATED.3IONS-SCNC: 5 MMOL/L (ref 3–14)
ANION GAP SERPL CALCULATED.3IONS-SCNC: 7 MMOL/L (ref 3–14)
ANION GAP SERPL CALCULATED.3IONS-SCNC: 8 MMOL/L (ref 3–14)
BASE EXCESS BLDA CALC-SCNC: 2.1 MMOL/L
BUN SERPL-MCNC: 85 MG/DL (ref 7–30)
BUN SERPL-MCNC: 86 MG/DL (ref 7–30)
BUN SERPL-MCNC: 87 MG/DL (ref 7–30)
BUN SERPL-MCNC: 88 MG/DL (ref 7–30)
BUN SERPL-MCNC: 90 MG/DL (ref 7–30)
CALCIUM SERPL-MCNC: 7.2 MG/DL (ref 8.5–10.1)
CALCIUM SERPL-MCNC: 7.3 MG/DL (ref 8.5–10.1)
CALCIUM SERPL-MCNC: 7.4 MG/DL (ref 8.5–10.1)
CALCIUM SERPL-MCNC: 7.5 MG/DL (ref 8.5–10.1)
CALCIUM SERPL-MCNC: 7.5 MG/DL (ref 8.5–10.1)
CALCIUM SERPL-MCNC: 7.6 MG/DL (ref 8.5–10.1)
CALCIUM SERPL-MCNC: 7.8 MG/DL (ref 8.5–10.1)
CALCIUM SERPL-MCNC: 7.8 MG/DL (ref 8.5–10.1)
CHLORIDE SERPL-SCNC: 129 MMOL/L (ref 94–109)
CHLORIDE SERPL-SCNC: 130 MMOL/L (ref 94–109)
CHLORIDE SERPL-SCNC: 130 MMOL/L (ref 94–109)
CHLORIDE SERPL-SCNC: 131 MMOL/L (ref 94–109)
CHLORIDE SERPL-SCNC: 132 MMOL/L (ref 94–109)
CHLORIDE SERPL-SCNC: 132 MMOL/L (ref 94–109)
CO2 SERPL-SCNC: 23 MMOL/L (ref 20–32)
CO2 SERPL-SCNC: 24 MMOL/L (ref 20–32)
CO2 SERPL-SCNC: 25 MMOL/L (ref 20–32)
CO2 SERPL-SCNC: 26 MMOL/L (ref 20–32)
CO2 SERPL-SCNC: 27 MMOL/L (ref 20–32)
CREAT SERPL-MCNC: 2.98 MG/DL (ref 0.66–1.25)
CREAT SERPL-MCNC: 3.01 MG/DL (ref 0.66–1.25)
CREAT SERPL-MCNC: 3.08 MG/DL (ref 0.66–1.25)
CREAT SERPL-MCNC: 3.1 MG/DL (ref 0.66–1.25)
CREAT SERPL-MCNC: 3.24 MG/DL (ref 0.66–1.25)
CREAT SERPL-MCNC: 3.25 MG/DL (ref 0.66–1.25)
CREAT SERPL-MCNC: 3.28 MG/DL (ref 0.66–1.25)
CREAT SERPL-MCNC: 3.31 MG/DL (ref 0.66–1.25)
ERYTHROCYTE [DISTWIDTH] IN BLOOD BY AUTOMATED COUNT: 17.7 % (ref 10–15)
GFR SERPL CREATININE-BSD FRML MDRD: 19 ML/MIN/1.7M2
GFR SERPL CREATININE-BSD FRML MDRD: 20 ML/MIN/1.7M2
GFR SERPL CREATININE-BSD FRML MDRD: 21 ML/MIN/1.7M2
GLUCOSE BLDC GLUCOMTR-MCNC: 115 MG/DL (ref 70–99)
GLUCOSE BLDC GLUCOMTR-MCNC: 128 MG/DL (ref 70–99)
GLUCOSE BLDC GLUCOMTR-MCNC: 134 MG/DL (ref 70–99)
GLUCOSE BLDC GLUCOMTR-MCNC: 140 MG/DL (ref 70–99)
GLUCOSE BLDC GLUCOMTR-MCNC: 142 MG/DL (ref 70–99)
GLUCOSE BLDC GLUCOMTR-MCNC: 143 MG/DL (ref 70–99)
GLUCOSE BLDC GLUCOMTR-MCNC: 147 MG/DL (ref 70–99)
GLUCOSE BLDC GLUCOMTR-MCNC: 147 MG/DL (ref 70–99)
GLUCOSE BLDC GLUCOMTR-MCNC: 149 MG/DL (ref 70–99)
GLUCOSE BLDC GLUCOMTR-MCNC: 152 MG/DL (ref 70–99)
GLUCOSE BLDC GLUCOMTR-MCNC: 154 MG/DL (ref 70–99)
GLUCOSE BLDC GLUCOMTR-MCNC: 154 MG/DL (ref 70–99)
GLUCOSE BLDC GLUCOMTR-MCNC: 160 MG/DL (ref 70–99)
GLUCOSE BLDC GLUCOMTR-MCNC: 173 MG/DL (ref 70–99)
GLUCOSE BLDC GLUCOMTR-MCNC: 176 MG/DL (ref 70–99)
GLUCOSE SERPL-MCNC: 146 MG/DL (ref 70–99)
GLUCOSE SERPL-MCNC: 151 MG/DL (ref 70–99)
GLUCOSE SERPL-MCNC: 154 MG/DL (ref 70–99)
GLUCOSE SERPL-MCNC: 158 MG/DL (ref 70–99)
GLUCOSE SERPL-MCNC: 159 MG/DL (ref 70–99)
GLUCOSE SERPL-MCNC: 164 MG/DL (ref 70–99)
GLUCOSE SERPL-MCNC: 167 MG/DL (ref 70–99)
GLUCOSE SERPL-MCNC: 174 MG/DL (ref 70–99)
HCO3 BLD-SCNC: 25 MMOL/L (ref 21–28)
HCT VFR BLD AUTO: 35.8 % (ref 40–53)
HGB BLD-MCNC: 11.4 G/DL (ref 13.3–17.7)
MAGNESIUM SERPL-MCNC: 2.5 MG/DL (ref 1.6–2.3)
MCH RBC QN AUTO: 29.6 PG (ref 26.5–33)
MCHC RBC AUTO-ENTMCNC: 31.8 G/DL (ref 31.5–36.5)
MCV RBC AUTO: 93 FL (ref 78–100)
O2/TOTAL GAS SETTING VFR VENT: 60 %
OSMOLALITY SERPL: 364 MMOL/KG (ref 280–301)
OSMOLALITY SERPL: 365 MMOL/KG (ref 280–301)
OSMOLALITY SERPL: 370 MMOL/KG (ref 280–301)
OSMOLALITY SERPL: 372 MMOL/KG (ref 280–301)
OXYHGB MFR BLD: 97 % (ref 92–100)
PCO2 BLD: 33 MM HG (ref 35–45)
PH BLD: 7.49 PH (ref 7.35–7.45)
PLATELET # BLD AUTO: 316 10E9/L (ref 150–450)
PO2 BLD: 98 MM HG (ref 80–105)
POTASSIUM SERPL-SCNC: 3.5 MMOL/L (ref 3.4–5.3)
POTASSIUM SERPL-SCNC: 3.6 MMOL/L (ref 3.4–5.3)
POTASSIUM SERPL-SCNC: 3.6 MMOL/L (ref 3.4–5.3)
POTASSIUM SERPL-SCNC: 3.7 MMOL/L (ref 3.4–5.3)
POTASSIUM SERPL-SCNC: 3.8 MMOL/L (ref 3.4–5.3)
POTASSIUM SERPL-SCNC: 3.9 MMOL/L (ref 3.4–5.3)
POTASSIUM SERPL-SCNC: 4 MMOL/L (ref 3.4–5.3)
POTASSIUM SERPL-SCNC: 4 MMOL/L (ref 3.4–5.3)
RBC # BLD AUTO: 3.85 10E12/L (ref 4.4–5.9)
SODIUM SERPL-SCNC: 160 MMOL/L (ref 133–144)
SODIUM SERPL-SCNC: 162 MMOL/L (ref 133–144)
SODIUM SERPL-SCNC: 163 MMOL/L (ref 133–144)
SODIUM SERPL-SCNC: 164 MMOL/L (ref 133–144)
SODIUM SERPL-SCNC: 165 MMOL/L (ref 133–144)
WBC # BLD AUTO: 24.4 10E9/L (ref 4–11)

## 2017-10-15 PROCEDURE — 83930 ASSAY OF BLOOD OSMOLALITY: CPT | Performed by: STUDENT IN AN ORGANIZED HEALTH CARE EDUCATION/TRAINING PROGRAM

## 2017-10-15 PROCEDURE — 87040 BLOOD CULTURE FOR BACTERIA: CPT | Performed by: STUDENT IN AN ORGANIZED HEALTH CARE EDUCATION/TRAINING PROGRAM

## 2017-10-15 PROCEDURE — 99291 CRITICAL CARE FIRST HOUR: CPT | Mod: GC | Performed by: INTERNAL MEDICINE

## 2017-10-15 PROCEDURE — 80048 BASIC METABOLIC PNL TOTAL CA: CPT | Performed by: STUDENT IN AN ORGANIZED HEALTH CARE EDUCATION/TRAINING PROGRAM

## 2017-10-15 PROCEDURE — 25000128 H RX IP 250 OP 636: Performed by: THORACIC SURGERY (CARDIOTHORACIC VASCULAR SURGERY)

## 2017-10-15 PROCEDURE — 25000132 ZZH RX MED GY IP 250 OP 250 PS 637: Performed by: INTERNAL MEDICINE

## 2017-10-15 PROCEDURE — 25000125 ZZHC RX 250: Performed by: STUDENT IN AN ORGANIZED HEALTH CARE EDUCATION/TRAINING PROGRAM

## 2017-10-15 PROCEDURE — 25000125 ZZHC RX 250: Performed by: THORACIC SURGERY (CARDIOTHORACIC VASCULAR SURGERY)

## 2017-10-15 PROCEDURE — 20000004 ZZH R&B ICU UMMC

## 2017-10-15 PROCEDURE — 00000146 ZZHCL STATISTIC GLUCOSE BY METER IP

## 2017-10-15 PROCEDURE — S0028 INJECTION, FAMOTIDINE, 20 MG: HCPCS | Performed by: THORACIC SURGERY (CARDIOTHORACIC VASCULAR SURGERY)

## 2017-10-15 PROCEDURE — 25000132 ZZH RX MED GY IP 250 OP 250 PS 637: Performed by: THORACIC SURGERY (CARDIOTHORACIC VASCULAR SURGERY)

## 2017-10-15 PROCEDURE — 27210437 ZZH NUTRITION PRODUCT SEMIELEM INTERMED LITER

## 2017-10-15 PROCEDURE — 83735 ASSAY OF MAGNESIUM: CPT | Performed by: STUDENT IN AN ORGANIZED HEALTH CARE EDUCATION/TRAINING PROGRAM

## 2017-10-15 PROCEDURE — S0032 INJECTION, NAFCILLIN SODIUM: HCPCS | Performed by: THORACIC SURGERY (CARDIOTHORACIC VASCULAR SURGERY)

## 2017-10-15 PROCEDURE — 93010 ELECTROCARDIOGRAM REPORT: CPT | Performed by: INTERNAL MEDICINE

## 2017-10-15 PROCEDURE — 82805 BLOOD GASES W/O2 SATURATION: CPT | Performed by: ORTHOPAEDIC SURGERY

## 2017-10-15 PROCEDURE — 25000132 ZZH RX MED GY IP 250 OP 250 PS 637: Performed by: STUDENT IN AN ORGANIZED HEALTH CARE EDUCATION/TRAINING PROGRAM

## 2017-10-15 PROCEDURE — 40000281 ZZH STATISTIC TRANSPORT TIME EA 15 MIN

## 2017-10-15 PROCEDURE — 85027 COMPLETE CBC AUTOMATED: CPT | Performed by: STUDENT IN AN ORGANIZED HEALTH CARE EDUCATION/TRAINING PROGRAM

## 2017-10-15 PROCEDURE — 93005 ELECTROCARDIOGRAM TRACING: CPT

## 2017-10-15 PROCEDURE — 94003 VENT MGMT INPAT SUBQ DAY: CPT

## 2017-10-15 PROCEDURE — 40000014 ZZH STATISTIC ARTERIAL MONITORING DAILY

## 2017-10-15 PROCEDURE — 84295 ASSAY OF SERUM SODIUM: CPT | Performed by: STUDENT IN AN ORGANIZED HEALTH CARE EDUCATION/TRAINING PROGRAM

## 2017-10-15 PROCEDURE — 25000132 ZZH RX MED GY IP 250 OP 250 PS 637: Performed by: SURGERY

## 2017-10-15 RX ORDER — SODIUM CHLORIDE, SODIUM LACTATE, POTASSIUM CHLORIDE, CALCIUM CHLORIDE 600; 310; 30; 20 MG/100ML; MG/100ML; MG/100ML; MG/100ML
INJECTION, SOLUTION INTRAVENOUS
Status: DISCONTINUED
Start: 2017-10-15 | End: 2017-10-27 | Stop reason: HOSPADM

## 2017-10-15 RX ADMIN — NYSTATIN 500000 UNITS: 100000 SUSPENSION ORAL at 18:27

## 2017-10-15 RX ADMIN — NAFCILLIN SODIUM 2 G: 2 INJECTION, POWDER, LYOPHILIZED, FOR SOLUTION INTRAMUSCULAR; INTRAVENOUS at 03:18

## 2017-10-15 RX ADMIN — POTASSIUM PHOSPHATE, MONOBASIC AND POTASSIUM PHOSPHATE, DIBASIC 10 MMOL: 224; 236 INJECTION, SOLUTION INTRAVENOUS at 02:07

## 2017-10-15 RX ADMIN — NAFCILLIN SODIUM 2 G: 2 INJECTION, POWDER, LYOPHILIZED, FOR SOLUTION INTRAMUSCULAR; INTRAVENOUS at 11:12

## 2017-10-15 RX ADMIN — NAFCILLIN SODIUM 2 G: 2 INJECTION, POWDER, LYOPHILIZED, FOR SOLUTION INTRAMUSCULAR; INTRAVENOUS at 18:27

## 2017-10-15 RX ADMIN — HUMAN INSULIN 5 UNITS/HR: 100 INJECTION, SOLUTION SUBCUTANEOUS at 03:49

## 2017-10-15 RX ADMIN — NAFCILLIN SODIUM 2 G: 2 INJECTION, POWDER, LYOPHILIZED, FOR SOLUTION INTRAMUSCULAR; INTRAVENOUS at 06:23

## 2017-10-15 RX ADMIN — HUMAN INSULIN 3 UNITS/HR: 100 INJECTION, SOLUTION SUBCUTANEOUS at 11:16

## 2017-10-15 RX ADMIN — POTASSIUM CHLORIDE 20 MEQ: 1.5 POWDER, FOR SOLUTION ORAL at 22:51

## 2017-10-15 RX ADMIN — ACETAMINOPHEN 1000 MG: 325 SOLUTION ORAL at 19:49

## 2017-10-15 RX ADMIN — MULTIVITAMIN 15 ML: LIQUID ORAL at 08:04

## 2017-10-15 RX ADMIN — NYSTATIN 500000 UNITS: 100000 SUSPENSION ORAL at 06:23

## 2017-10-15 RX ADMIN — NAFCILLIN SODIUM 2 G: 2 INJECTION, POWDER, LYOPHILIZED, FOR SOLUTION INTRAMUSCULAR; INTRAVENOUS at 22:57

## 2017-10-15 RX ADMIN — NAFCILLIN SODIUM 2 G: 2 INJECTION, POWDER, LYOPHILIZED, FOR SOLUTION INTRAMUSCULAR; INTRAVENOUS at 15:00

## 2017-10-15 RX ADMIN — POTASSIUM CHLORIDE 20 MEQ: 1.5 POWDER, FOR SOLUTION ORAL at 03:49

## 2017-10-15 RX ADMIN — ACETAMINOPHEN 650 MG: 325 SOLUTION ORAL at 03:49

## 2017-10-15 RX ADMIN — FAMOTIDINE 20 MG: 10 INJECTION, SOLUTION INTRAVENOUS at 11:12

## 2017-10-15 RX ADMIN — DEXMEDETOMIDINE HYDROCHLORIDE 0.2 MCG/KG/HR: 4 INJECTION, SOLUTION INTRAVENOUS at 03:49

## 2017-10-15 RX ADMIN — ASPIRIN 81 MG CHEWABLE TABLET 81 MG: 81 TABLET CHEWABLE at 08:04

## 2017-10-15 RX ADMIN — HUMAN INSULIN 4 UNITS/HR: 100 INJECTION, SOLUTION SUBCUTANEOUS at 21:54

## 2017-10-15 RX ADMIN — NYSTATIN 500000 UNITS: 100000 SUSPENSION ORAL at 12:35

## 2017-10-15 RX ADMIN — NYSTATIN 500000 UNITS: 100000 SUSPENSION ORAL at 00:06

## 2017-10-15 ASSESSMENT — VISUAL ACUITY
OU: OTHER (SEE COMMENT)

## 2017-10-15 NOTE — PLAN OF CARE
Problem: Patient Care Overview  Goal: Plan of Care/Patient Progress Review  Outcome: No Change  D/I/A: Patient admitted on 10/6/17 for Endocarditis.   Neuro- Predecex at 0.1 mcg/kg/hr. Pupils round and reactive to light, 2-4 mm. Grimaces with painful stimuli. LUE withdraws to pain intermittently. Not following commands.   CV- Sinus tach with bigeminal PVCs. - 130. MAP goal > 65. Patient had episode of hypotension at 2100 (60/40s). LR 1000 mL bolus given with good results.   Resp- Lung sounds coarse to clear/diminished with suctioning. Vent mode CMV with PEEP 5.0. Increased FiO2 from 50% to 60% due to ABG PO2 of 75%.   GI- Bowel sounds auscultated X 4. Incontinent of 2 loose BMs. TF at goal. FWF of 100 mL/hr due to elevated serum sodium.   - Espinoza UO /hr.   ID- Tmax 102.2 (bladder). WBC 24.4, trending up. Blood cultures X 2 sent. Antibiotics given per order.

## 2017-10-15 NOTE — PROGRESS NOTES
CVICU PROGRESS NOTE  10/15/2017  Attending: Ramesh Kuo, *    S:   Overnight the patient got extubated when he was being turned and then re-intubated. Was hypotensive afterwards and started on Norepi but quickly weaned off.     O:   Vitals:    10/15/17 0630 10/15/17 0700 10/15/17 0800 10/15/17 0900   BP: 143/86 133/78 131/74 135/80   BP Location:       Pulse:       Resp: 23 28 26 27   Temp: 101.1  F (38.4  C)      TempSrc:       SpO2: 98% 97% 95% 97%   Weight:       Height:         Vitals:    10/13/17 0700 10/14/17 0400 10/15/17 0400   Weight: 93.3 kg (205 lb 11 oz) 92.5 kg (203 lb 14.8 oz) 89.5 kg (197 lb 5 oz)       Intake/Output Summary (Last 24 hours) at 10/15/17 1046  Last data filed at 10/15/17 0900   Gross per 24 hour   Intake          6446.37 ml   Output             4105 ml   Net          2341.37 ml       Ventilation Mode: CPAP/PS  FiO2 (%): 60 %  Rate Set (breaths/minute): 16 breaths/min  Tidal Volume Set (mL): 500 mL  PEEP (cm H2O): 5 cmH2O  Pressure Support (cm H2O): 7 cmH2O  Oxygen Concentration (%): 50 %  Resp: 27    Recent Labs  Lab 10/15/17  0001 10/14/17  2116 10/14/17  0404 10/13/17  0357   PH 7.49* 7.48* 7.39 7.38   PCO2 33* 32* 42 42   PO2 98 75* 105 112*   HCO3 25 24 25 25   O2PER 60 50.0 50.0 50.0       MAPs:   General: Intubated, lightly sedated  Neck: Supple, no tracheal deviation  Cardiovascular: RRR  Pumonary: Non labored breathing on MV  Abdomen: Soft, non distended, non tender  Ext: minimal edema, warm  Neuro: moving all extremities spontaneously      Labs:  [unfilled]  GLUCOSE:     Recent Labs  Lab 10/15/17  0959 10/15/17  0755 10/15/17  0752 10/15/17  0632 10/15/17  0602 10/15/17  0445 10/15/17  0412 10/15/17  0324  10/15/17  0209  10/15/17  0001  10/14/17  2116  10/14/17  2014   GLC  --   --  154*  --   --   --  167*  --   --  146*  --  151*  --  147*  --  134*   * 128*  --  115* 143* 173*  --  140*  < >  --   < >  --   < >  --   < >  --    < > = values in this interval  not displayed.      Imaging:  none      A/P:    Cricket Miguel is a 62 year old with history of AVR and ascending aortic repair in May 2016 presented on 10/6 with acute mental status changes found to have large moises-aortic fluid collection and multiple strokes likely septic emboli.    Neuro: tylenol for fevers,continue neuro checks Q2H,  precedex and wake up as able  Resp: inability to protect airway requiring mechanical ventilation, on minimal vent settings, could wean to extubate if mental status improves   CV: goal normotension per neurology/neurosurgery  GI/FEN: tube feeds to goal, continue free water for hypernatremia, will recheck BMPs  Renal: JOSE non-oliguric, clinically euvolemic   Endo: Insulin gtt for glucose control  ID: continue current antibiotic regimen pending ID recommendations, daily blood cultures negative since 10/12  Heme: no bleeding concerns at this time, hold anticoagulation per neuro and neurosurgery  Prophylaxis: H2 blocker, no chemical dvt ppx due to evolving stroke  Lines: will try to obtain PICC and then DC CVL  Dispo: CV-ICU     Rula Contreras  General Surgery Resident  Cardiothoracic Surgery  [phone]

## 2017-10-15 NOTE — PROGRESS NOTES
Anesthesia STAT overhead page called, patient was assessed at bedside.    At approximately 7:30 p.m. While nursing cares were being performed and patient was being turned in bed, pts ET tube fell out.  No patient agitation at the time.  Anesthesia was called and arrived immediately and the patient was re-intubated. There were no desaturations, no hemodynamic changes or events.    - STAT CXR is ordered to confirm ET tube placement  - continue propofol/precidex for sedation  - re-secure ETT in place and watch for ET tube position during cares    Discussed with CV fellow    Javier Dash MD  CV/trauma [email or web address]

## 2017-10-15 NOTE — PROGRESS NOTES
CVICU PROGRESS NOTE  10/15/2017  Attending: Ramesh Kuo, *    S:   Overnight the patient got extubated when he was being turned and then re-intubated. Was hypotensive afterwards and started on Norepi but quickly weaned off.     O:   Vitals:    10/15/17 0630 10/15/17 0700 10/15/17 0800 10/15/17 0900   BP: 143/86 133/78 131/74 135/80   BP Location:       Pulse:       Resp: 23 28 26 27   Temp: 101.1  F (38.4  C)      TempSrc:       SpO2: 98% 97% 95% 97%   Weight:       Height:         Vitals:    10/13/17 0700 10/14/17 0400 10/15/17 0400   Weight: 93.3 kg (205 lb 11 oz) 92.5 kg (203 lb 14.8 oz) 89.5 kg (197 lb 5 oz)       Intake/Output Summary (Last 24 hours) at 10/15/17 1041  Last data filed at 10/15/17 0900   Gross per 24 hour   Intake          6446.37 ml   Output             4105 ml   Net          2341.37 ml       Ventilation Mode: CPAP/PS  FiO2 (%): 60 %  Rate Set (breaths/minute): 16 breaths/min  Tidal Volume Set (mL): 500 mL  PEEP (cm H2O): 5 cmH2O  Pressure Support (cm H2O): 7 cmH2O  Oxygen Concentration (%): 50 %  Resp: 27    Recent Labs  Lab 10/15/17  0001 10/14/17  2116 10/14/17  0404 10/13/17  0357   PH 7.49* 7.48* 7.39 7.38   PCO2 33* 32* 42 42   PO2 98 75* 105 112*   HCO3 25 24 25 25   O2PER 60 50.0 50.0 50.0       MAPs:   General: Intubated, lightly sedated  Neck: Supple, no tracheal deviation  Cardiovascular: RRR  Pumonary: Non labored breathing on MV  Abdomen: Soft, non distended, non tender  Ext: minimal edema, warm  Neuro: moving all extremities spontaneously      Labs:  [unfilled]  GLUCOSE:     Recent Labs  Lab 10/15/17  0959 10/15/17  0755 10/15/17  0752 10/15/17  0632 10/15/17  0602 10/15/17  0445 10/15/17  0412 10/15/17  0324  10/15/17  0209  10/15/17  0001  10/14/17  2116  10/14/17  2014   GLC  --   --  154*  --   --   --  167*  --   --  146*  --  151*  --  147*  --  134*   * 128*  --  115* 143* 173*  --  140*  < >  --   < >  --   < >  --   < >  --    < > = values in this interval  not displayed.      Imaging:  none      A/P:   Cricket Miguel is a 62 year old with history of AVR and ascending aortic repair in May 2016 presented on 10/6 with acute mental status changes found to have large moises-aortic fluid collection and multiple strokes likely septic emboli.    Neuro: tylenol for fevers,continue neuro checks Q2H,  precedex and wake up as able  Resp: inability to protect airway requiring mechanical ventilation, on minimal vent settings, could wean to extubate if mental status improves   CV: goal normotension per neurology/neurosurgery  GI/FEN: tube feeds to goal, continue free water for hypernatremia, will recheck BMPs  Renal: JOSE non-oliguric, clinically euvolemic   Endo: Insulin gtt for glucose control  ID: continue current antibiotic regimen pending ID recommendations, daily blood cultures negative since 10/12  Heme: no bleeding concerns at this time, hold anticoagulation per neuro and neurosurgery  Prophylaxis: H2 blocker, no chemical dvt ppx due to evolving stroke  Lines: will try to obtain PICC and then DC CVL  Dispo: CV-ICU    Rula Contreras  General Surgery Resident  Cardiothoracic Surgery  [phone]

## 2017-10-15 NOTE — ANESTHESIA PROCEDURE NOTES
ANESTHESIOLOGY RESIDENT/CRNA INTUBATION NOTE  Indication for intubation: airway protection.    History regarding the most recent potassium obtained: Yes  History regarding renal failure obtained: Yes  History of presence or absence of CVA/stroke was obtained: Yes  History of presence or absence of NM disorder obtained: Yes  Post Intubation:  No apparent complications, Sedation to be ordered by primary/ICU team, Primary/ICU team to review CXR, Vent settings by primary/ICU team, ETT secured and Report given to primary nurse and/or team  Patient self extubated. Anesthesia stat called to re-intubate patient. Easy airway. Clean cords.

## 2017-10-15 NOTE — SIGNIFICANT EVENT
At approximately 1900 hours tonight the patient's ETT was pulled out during a turn while cleaning the patient. Anaesthesia, CVTS, and Neuro Critical care teams were alerted and patient was bagged with a bag-valve mask while anaesthesia was on their way. Patient's O2 saturation was maintained in the 93-97% range while he was being bagged. Patient was found to have his top dentures in from the previous intubation and those were removed and placed at bedside. Patient was reintubated with a 8.0 ETT that was placed at 25cm at the lip. Chest x-ray was performed and results are in process. Patient's significant other and son-in-law arrived at bedside shortly after dinner and were updated to the event.    Will continue to monitor for safety and comfort.    Jewel Mckenna RN

## 2017-10-15 NOTE — PROGRESS NOTES
Neuroscience Intensive Care Progress Note    10/15/2017      24 hour events:  Pt is off sedation, Small sluggishly reactive pupil bilaterally approximately 2mm - Left oculocephalic & LT Corneal abscent with mild intermittent nystagmus to the RT side , ON PRESURE SUPPORT, Has one spike of fever 39 sent blood cultures      24 Hour Vital Signs Summary:  Temperatures:  Current - Temp: 101.1  F (38.4  C); Max - Temp  Av.8  F (38.2  C)  Min: 99.9  F (37.7  C)  Max: 101.5  F (38.6  C)  Respiration range: Resp  Av.4  Min: 16  Max: 20  Pulse range: No Data Recorded  Blood pressure range: Systolic (24hrs), Av , Min:94 , Max:175   ; Diastolic (24hrs), Av, Min:57, Max:98    Pulse oximetry range: SpO2  Av.2 %  Min: 93 %  Max: 97 %    Ventilator Settings  Ventilation Mode: CPAP/PS  FiO2 (%): 60 %  Rate Set (breaths/minute): 16 breaths/min  Tidal Volume Set (mL): 500 mL  PEEP (cm H2O): 5 cmH2O  Pressure Support (cm H2O): 7 cmH2O  Oxygen Concentration (%): 50 %  Resp: 23      Intake/Output Summary (Last 24 hours) at 10/13/17 1808  Last data filed at 10/13/17 1700   Gross per 24 hour   Intake          3939.68 ml   Output             3025 ml   Net           914.68 ml       Arterial Line BP: ()/() 143/72  MAP:  [40 mmHg-143 mmHg] 93 mmHg  BP - Mean:  [] 103  CVP:  [2 mmHg-7 mmHg] 5 mmHg    Current Medications:    nystatin  500,000 Units Swish & Spit Q6H     gentamicin  80 mg Intravenous Q36H     aspirin  81 mg Oral or Feeding Tube Daily     nafcillin  2 g Intravenous Q4H     polyethylene glycol  17 g Oral Daily     levofloxacin  750 mg Intravenous Q48H     famotidine  20 mg Intravenous Q24H     influenza quadrivalent (PF) vacc age 3 yrs and older  0.5 mL Intramuscular Prior to discharge     pneumococcal vaccine  0.5 mL Intramuscular Prior to discharge     sodium chloride (PF)  3 mL Intravenous Q8H     multivitamins with minerals  15 mL Per Feeding Tube Daily     senna-docusate  1-2 tablet  "Oral or Feeding Tube BID     sodium chloride (PF)  3 mL Intracatheter Q8H       PRN Medications:  lidocaine BUFFERED 1 %, ondansetron **OR** ondansetron, hydrALAZINE, sodium chloride (PF), - MEDICATION INSTRUCTIONS -, acetaminophen, prochlorperazine **OR** prochlorperazine, naloxone, IV fluid REPLACEMENT ONLY, glucose **OR** dextrose **OR** glucagon, lidocaine (buffered or not buffered), lidocaine 4%, Reason beta blocker order not selected, insulin aspart, albuterol, albuterol, fentaNYL, potassium chloride, potassium chloride, potassium chloride, potassium chloride with lidocaine, potassium chloride, magnesium sulfate, magnesium sulfate, potassium phosphate (KPHOS) in D5W IV, potassium phosphate (KPHOS) in D5W IV, potassium phosphate (KPHOS) in D5W IV, potassium phosphate (KPHOS) in D5W IV, potassium phosphate (KPHOS) in D5W IV, diphenhydrAMINE **OR** diphenhydrAMINE    Infusions:    dexmedetomidine 0.1 mcg/kg/hr (10/15/17 0410)     norepinephrine Stopped (10/14/17 2311)     niCARdipine (CARDENE) infusion ADULT/PEDS GREATER than or EQUAL to 45 kg max conc Stopped (10/11/17 1014)     - MEDICATION INSTRUCTIONS -       IV fluid REPLACEMENT ONLY       insulin (regular) 3 Units/hr (10/15/17 0632)     Reason beta blocker order not selected         Allergies   Allergen Reactions     Lisinopril Swelling     One-sided facial swelling after being on lisinopril/HCTZ for one week.        Physical Examination:  /86  Pulse 82  Temp 101.1  F (38.4  C)  Resp 23  Ht 1.727 m (5' 8\")  Wt 89.5 kg (197 lb 5 oz)  SpO2 98%  BMI 30 kg/m2  Intubated, on sedation. 2mm sluggish pupil - no corneal response on left - brisk response on right. Left oculocephalic absent. Not withdrawing with painful stimuli. Grimace to noxious stimuli, LT UL withdraw intermittentily.      Labs/Studies:  Recent Labs   Lab Test  10/13/17   1553  10/13/17   1406  10/13/17   1204   10/13/17   0357   10/12/17   0446   10/11/17   0312   NA  165*  167*  166* "   < >  169*  169*   < >  167*  167*   < >  162*   POTASSIUM  4.1  4.2  4.5   < >  4.5   < >  4.2   --   3.8   CHLORIDE  133*  135*  134*   < >  136*   < >  134*  >125*   --   130*   CO2  27  27  26   < >  26   < >  24   --   24   ANIONGAP  5  6  6   < >  7   < >  9   --   8   GLC  155*  188*  176*   < >  183*   < >  209*   --   201*   BUN  79*  81*  82*   < >  81*   < >  74*   --   60*   CR  3.20*  3.22*  3.16*   < >  3.31*   < >  3.31*   --   3.35*   IZABELLA  7.9*  7.8*  7.7*   < >  7.8*   < >  7.8*   --   8.1*   WBC   --    --    --    --   15.7*   --   11.5*   --   13.4*   RBC   --    --    --    --   3.62*   --   4.08*   --   4.45   HGB   --    --    --    --   10.7*   --   12.0*   --   13.4   PLT   --    --    --    --   322   --   266   --   238    < > = values in this interval not displayed.       Recent Labs   Lab Test  10/08/17   0328  10/06/17   1322  10/06/17   1020  05/18/16   0435   INR  1.03  1.25*  1.23*  1.28*   PTT   --   32  32  35           Recent Labs  Lab 10/15/17  0001 10/14/17  2116 10/14/17  0404 10/13/17  0357   PH 7.49* 7.48* 7.39 7.38   PCO2 33* 32* 42 42   PO2 98 75* 105 112*   HCO3 25 24 25 25   O2PER 60 50.0 50.0 50.0           Imaging:  Reviewed     Assessment/Plan    - Exam changes noted above  - ASA 81 mg   - off sedation to assess neurologic exam  - hypertonic saline off, Na 163 - can allow to slowly drift down - given exam change, avoid sudden drop in Na.  - Cerebral Angiogram normal  - will continue to follow      Plan discussed with Dr. iMller.      Sherief Boss  Neurocritical care Fellow  Pager [phone]

## 2017-10-16 ENCOUNTER — APPOINTMENT (OUTPATIENT)
Dept: PHYSICAL THERAPY | Facility: CLINIC | Age: 62
DRG: 004 | End: 2017-10-16
Attending: NURSE PRACTITIONER
Payer: COMMERCIAL

## 2017-10-16 ENCOUNTER — APPOINTMENT (OUTPATIENT)
Dept: GENERAL RADIOLOGY | Facility: CLINIC | Age: 62
DRG: 004 | End: 2017-10-16
Attending: NURSE PRACTITIONER
Payer: COMMERCIAL

## 2017-10-16 LAB
ANION GAP SERPL CALCULATED.3IONS-SCNC: 6 MMOL/L (ref 3–14)
ANION GAP SERPL CALCULATED.3IONS-SCNC: 6 MMOL/L (ref 3–14)
ANION GAP SERPL CALCULATED.3IONS-SCNC: 7 MMOL/L (ref 3–14)
ANION GAP SERPL CALCULATED.3IONS-SCNC: 8 MMOL/L (ref 3–14)
ANION GAP SERPL CALCULATED.3IONS-SCNC: 8 MMOL/L (ref 3–14)
ANION GAP SERPL CALCULATED.3IONS-SCNC: 9 MMOL/L (ref 3–14)
BASE EXCESS BLDA CALC-SCNC: 0.6 MMOL/L
BUN SERPL-MCNC: 80 MG/DL (ref 7–30)
BUN SERPL-MCNC: 80 MG/DL (ref 7–30)
BUN SERPL-MCNC: 85 MG/DL (ref 7–30)
BUN SERPL-MCNC: 86 MG/DL (ref 7–30)
BUN SERPL-MCNC: 87 MG/DL (ref 7–30)
BUN SERPL-MCNC: 87 MG/DL (ref 7–30)
BUN SERPL-MCNC: 88 MG/DL (ref 7–30)
CALCIUM SERPL-MCNC: 7 MG/DL (ref 8.5–10.1)
CALCIUM SERPL-MCNC: 7.1 MG/DL (ref 8.5–10.1)
CALCIUM SERPL-MCNC: 7.2 MG/DL (ref 8.5–10.1)
CALCIUM SERPL-MCNC: 7.4 MG/DL (ref 8.5–10.1)
CALCIUM SERPL-MCNC: 7.5 MG/DL (ref 8.5–10.1)
CHLORIDE SERPL-SCNC: 130 MMOL/L (ref 94–109)
CHLORIDE SERPL-SCNC: 131 MMOL/L (ref 94–109)
CO2 SERPL-SCNC: 20 MMOL/L (ref 20–32)
CO2 SERPL-SCNC: 22 MMOL/L (ref 20–32)
CO2 SERPL-SCNC: 22 MMOL/L (ref 20–32)
CO2 SERPL-SCNC: 23 MMOL/L (ref 20–32)
CO2 SERPL-SCNC: 23 MMOL/L (ref 20–32)
CO2 SERPL-SCNC: 24 MMOL/L (ref 20–32)
CO2 SERPL-SCNC: 25 MMOL/L (ref 20–32)
CREAT SERPL-MCNC: 2.63 MG/DL (ref 0.66–1.25)
CREAT SERPL-MCNC: 2.71 MG/DL (ref 0.66–1.25)
CREAT SERPL-MCNC: 2.73 MG/DL (ref 0.66–1.25)
CREAT SERPL-MCNC: 2.77 MG/DL (ref 0.66–1.25)
CREAT SERPL-MCNC: 2.87 MG/DL (ref 0.66–1.25)
CREAT SERPL-MCNC: 2.99 MG/DL (ref 0.66–1.25)
CREAT SERPL-MCNC: 3.04 MG/DL (ref 0.66–1.25)
CRP SERPL-MCNC: 48 MG/L (ref 0–8)
ERYTHROCYTE [DISTWIDTH] IN BLOOD BY AUTOMATED COUNT: 17.3 % (ref 10–15)
GENTAMICIN SERPL-MCNC: 1.4 MG/L
GENTAMICIN SERPL-MCNC: 2.5 MG/L
GFR SERPL CREATININE-BSD FRML MDRD: 21 ML/MIN/1.7M2
GFR SERPL CREATININE-BSD FRML MDRD: 21 ML/MIN/1.7M2
GFR SERPL CREATININE-BSD FRML MDRD: 22 ML/MIN/1.7M2
GFR SERPL CREATININE-BSD FRML MDRD: 23 ML/MIN/1.7M2
GFR SERPL CREATININE-BSD FRML MDRD: 24 ML/MIN/1.7M2
GFR SERPL CREATININE-BSD FRML MDRD: 24 ML/MIN/1.7M2
GFR SERPL CREATININE-BSD FRML MDRD: 25 ML/MIN/1.7M2
GLUCOSE BLDC GLUCOMTR-MCNC: 121 MG/DL (ref 70–99)
GLUCOSE BLDC GLUCOMTR-MCNC: 123 MG/DL (ref 70–99)
GLUCOSE BLDC GLUCOMTR-MCNC: 126 MG/DL (ref 70–99)
GLUCOSE BLDC GLUCOMTR-MCNC: 130 MG/DL (ref 70–99)
GLUCOSE BLDC GLUCOMTR-MCNC: 133 MG/DL (ref 70–99)
GLUCOSE BLDC GLUCOMTR-MCNC: 134 MG/DL (ref 70–99)
GLUCOSE BLDC GLUCOMTR-MCNC: 134 MG/DL (ref 70–99)
GLUCOSE BLDC GLUCOMTR-MCNC: 135 MG/DL (ref 70–99)
GLUCOSE BLDC GLUCOMTR-MCNC: 136 MG/DL (ref 70–99)
GLUCOSE BLDC GLUCOMTR-MCNC: 138 MG/DL (ref 70–99)
GLUCOSE BLDC GLUCOMTR-MCNC: 139 MG/DL (ref 70–99)
GLUCOSE BLDC GLUCOMTR-MCNC: 141 MG/DL (ref 70–99)
GLUCOSE BLDC GLUCOMTR-MCNC: 145 MG/DL (ref 70–99)
GLUCOSE BLDC GLUCOMTR-MCNC: 154 MG/DL (ref 70–99)
GLUCOSE BLDC GLUCOMTR-MCNC: 170 MG/DL (ref 70–99)
GLUCOSE SERPL-MCNC: 133 MG/DL (ref 70–99)
GLUCOSE SERPL-MCNC: 140 MG/DL (ref 70–99)
GLUCOSE SERPL-MCNC: 142 MG/DL (ref 70–99)
GLUCOSE SERPL-MCNC: 152 MG/DL (ref 70–99)
GLUCOSE SERPL-MCNC: 153 MG/DL (ref 70–99)
GLUCOSE SERPL-MCNC: 179 MG/DL (ref 70–99)
GLUCOSE SERPL-MCNC: 180 MG/DL (ref 70–99)
HCO3 BLD-SCNC: 24 MMOL/L (ref 21–28)
HCT VFR BLD AUTO: 32.5 % (ref 40–53)
HGB BLD-MCNC: 10.5 G/DL (ref 13.3–17.7)
INTERPRETATION ECG - MUSE: NORMAL
MAGNESIUM SERPL-MCNC: 2.8 MG/DL (ref 1.6–2.3)
MCH RBC QN AUTO: 30 PG (ref 26.5–33)
MCHC RBC AUTO-ENTMCNC: 32.3 G/DL (ref 31.5–36.5)
MCV RBC AUTO: 93 FL (ref 78–100)
O2/TOTAL GAS SETTING VFR VENT: 60 %
OSMOLALITY SERPL: 359 MMOL/KG (ref 280–301)
OSMOLALITY SERPL: 361 MMOL/KG (ref 280–301)
OSMOLALITY SERPL: 362 MMOL/KG (ref 280–301)
OSMOLALITY SERPL: 364 MMOL/KG (ref 280–301)
PCO2 BLD: 32 MM HG (ref 35–45)
PH BLD: 7.47 PH (ref 7.35–7.45)
PHOSPHATE SERPL-MCNC: 3 MG/DL (ref 2.5–4.5)
PLATELET # BLD AUTO: 298 10E9/L (ref 150–450)
PLATELET # BLD AUTO: 309 10E9/L (ref 150–450)
PO2 BLD: 93 MM HG (ref 80–105)
POTASSIUM SERPL-SCNC: 3.4 MMOL/L (ref 3.4–5.3)
POTASSIUM SERPL-SCNC: 3.5 MMOL/L (ref 3.4–5.3)
POTASSIUM SERPL-SCNC: 3.6 MMOL/L (ref 3.4–5.3)
POTASSIUM SERPL-SCNC: 3.8 MMOL/L (ref 3.4–5.3)
POTASSIUM SERPL-SCNC: 3.8 MMOL/L (ref 3.4–5.3)
POTASSIUM SERPL-SCNC: 3.9 MMOL/L (ref 3.4–5.3)
POTASSIUM SERPL-SCNC: 4 MMOL/L (ref 3.4–5.3)
PREALB SERPL IA-MCNC: 23 MG/DL (ref 15–45)
RBC # BLD AUTO: 3.5 10E12/L (ref 4.4–5.9)
SODIUM SERPL-SCNC: 158 MMOL/L (ref 133–144)
SODIUM SERPL-SCNC: 159 MMOL/L (ref 133–144)
SODIUM SERPL-SCNC: 160 MMOL/L (ref 133–144)
SODIUM SERPL-SCNC: 161 MMOL/L (ref 133–144)
SODIUM SERPL-SCNC: 161 MMOL/L (ref 133–144)
SODIUM SERPL-SCNC: 162 MMOL/L (ref 133–144)
SODIUM SERPL-SCNC: 163 MMOL/L (ref 133–144)
WBC # BLD AUTO: 18.3 10E9/L (ref 4–11)

## 2017-10-16 PROCEDURE — 25000125 ZZHC RX 250: Performed by: THORACIC SURGERY (CARDIOTHORACIC VASCULAR SURGERY)

## 2017-10-16 PROCEDURE — 84295 ASSAY OF SERUM SODIUM: CPT | Performed by: STUDENT IN AN ORGANIZED HEALTH CARE EDUCATION/TRAINING PROGRAM

## 2017-10-16 PROCEDURE — 85049 AUTOMATED PLATELET COUNT: CPT | Performed by: STUDENT IN AN ORGANIZED HEALTH CARE EDUCATION/TRAINING PROGRAM

## 2017-10-16 PROCEDURE — 85027 COMPLETE CBC AUTOMATED: CPT | Performed by: STUDENT IN AN ORGANIZED HEALTH CARE EDUCATION/TRAINING PROGRAM

## 2017-10-16 PROCEDURE — 84134 ASSAY OF PREALBUMIN: CPT | Performed by: STUDENT IN AN ORGANIZED HEALTH CARE EDUCATION/TRAINING PROGRAM

## 2017-10-16 PROCEDURE — 86140 C-REACTIVE PROTEIN: CPT | Performed by: STUDENT IN AN ORGANIZED HEALTH CARE EDUCATION/TRAINING PROGRAM

## 2017-10-16 PROCEDURE — 82803 BLOOD GASES ANY COMBINATION: CPT | Performed by: ORTHOPAEDIC SURGERY

## 2017-10-16 PROCEDURE — 83735 ASSAY OF MAGNESIUM: CPT | Performed by: STUDENT IN AN ORGANIZED HEALTH CARE EDUCATION/TRAINING PROGRAM

## 2017-10-16 PROCEDURE — 80170 ASSAY OF GENTAMICIN: CPT | Performed by: STUDENT IN AN ORGANIZED HEALTH CARE EDUCATION/TRAINING PROGRAM

## 2017-10-16 PROCEDURE — 40000014 ZZH STATISTIC ARTERIAL MONITORING DAILY

## 2017-10-16 PROCEDURE — S0028 INJECTION, FAMOTIDINE, 20 MG: HCPCS | Performed by: THORACIC SURGERY (CARDIOTHORACIC VASCULAR SURGERY)

## 2017-10-16 PROCEDURE — 97110 THERAPEUTIC EXERCISES: CPT | Mod: GP | Performed by: REHABILITATION PRACTITIONER

## 2017-10-16 PROCEDURE — 25000125 ZZHC RX 250: Performed by: STUDENT IN AN ORGANIZED HEALTH CARE EDUCATION/TRAINING PROGRAM

## 2017-10-16 PROCEDURE — 00000146 ZZHCL STATISTIC GLUCOSE BY METER IP

## 2017-10-16 PROCEDURE — 25000128 H RX IP 250 OP 636: Performed by: THORACIC SURGERY (CARDIOTHORACIC VASCULAR SURGERY)

## 2017-10-16 PROCEDURE — 83930 ASSAY OF BLOOD OSMOLALITY: CPT | Performed by: STUDENT IN AN ORGANIZED HEALTH CARE EDUCATION/TRAINING PROGRAM

## 2017-10-16 PROCEDURE — 40000193 ZZH STATISTIC PT WARD VISIT: Performed by: REHABILITATION PRACTITIONER

## 2017-10-16 PROCEDURE — 80048 BASIC METABOLIC PNL TOTAL CA: CPT | Performed by: STUDENT IN AN ORGANIZED HEALTH CARE EDUCATION/TRAINING PROGRAM

## 2017-10-16 PROCEDURE — 93005 ELECTROCARDIOGRAM TRACING: CPT

## 2017-10-16 PROCEDURE — 40000275 ZZH STATISTIC RCP TIME EA 10 MIN

## 2017-10-16 PROCEDURE — 84100 ASSAY OF PHOSPHORUS: CPT | Performed by: STUDENT IN AN ORGANIZED HEALTH CARE EDUCATION/TRAINING PROGRAM

## 2017-10-16 PROCEDURE — 93010 ELECTROCARDIOGRAM REPORT: CPT | Mod: 76 | Performed by: INTERNAL MEDICINE

## 2017-10-16 PROCEDURE — 20000004 ZZH R&B ICU UMMC

## 2017-10-16 PROCEDURE — 71010 XR CHEST PORT 1 VW: CPT

## 2017-10-16 PROCEDURE — 25000125 ZZHC RX 250

## 2017-10-16 PROCEDURE — 99291 CRITICAL CARE FIRST HOUR: CPT | Mod: GC | Performed by: ANESTHESIOLOGY

## 2017-10-16 PROCEDURE — 25000132 ZZH RX MED GY IP 250 OP 250 PS 637: Performed by: THORACIC SURGERY (CARDIOTHORACIC VASCULAR SURGERY)

## 2017-10-16 PROCEDURE — 25000132 ZZH RX MED GY IP 250 OP 250 PS 637: Performed by: STUDENT IN AN ORGANIZED HEALTH CARE EDUCATION/TRAINING PROGRAM

## 2017-10-16 PROCEDURE — 94003 VENT MGMT INPAT SUBQ DAY: CPT

## 2017-10-16 PROCEDURE — 36415 COLL VENOUS BLD VENIPUNCTURE: CPT | Performed by: STUDENT IN AN ORGANIZED HEALTH CARE EDUCATION/TRAINING PROGRAM

## 2017-10-16 PROCEDURE — S0032 INJECTION, NAFCILLIN SODIUM: HCPCS | Performed by: THORACIC SURGERY (CARDIOTHORACIC VASCULAR SURGERY)

## 2017-10-16 PROCEDURE — 25000132 ZZH RX MED GY IP 250 OP 250 PS 637: Performed by: SURGERY

## 2017-10-16 PROCEDURE — 87040 BLOOD CULTURE FOR BACTERIA: CPT | Performed by: STUDENT IN AN ORGANIZED HEALTH CARE EDUCATION/TRAINING PROGRAM

## 2017-10-16 RX ORDER — ADENOSINE 3 MG/ML
INJECTION, SOLUTION INTRAVENOUS
Status: DISCONTINUED
Start: 2017-10-16 | End: 2017-10-27 | Stop reason: HOSPADM

## 2017-10-16 RX ORDER — ESMOLOL HYDROCHLORIDE 20 MG/ML
50-300 INJECTION, SOLUTION INTRAVENOUS CONTINUOUS
Status: DISCONTINUED | OUTPATIENT
Start: 2017-10-16 | End: 2017-10-16

## 2017-10-16 RX ORDER — METOPROLOL TARTRATE 1 MG/ML
INJECTION, SOLUTION INTRAVENOUS
Status: COMPLETED
Start: 2017-10-16 | End: 2017-10-16

## 2017-10-16 RX ORDER — SODIUM CHLORIDE, SODIUM LACTATE, POTASSIUM CHLORIDE, CALCIUM CHLORIDE 600; 310; 30; 20 MG/100ML; MG/100ML; MG/100ML; MG/100ML
INJECTION, SOLUTION INTRAVENOUS
Status: DISCONTINUED
Start: 2017-10-16 | End: 2017-10-27 | Stop reason: HOSPADM

## 2017-10-16 RX ORDER — GENTAMICIN SULFATE 80 MG/100ML
80 INJECTION, SOLUTION INTRAVENOUS EVERY 24 HOURS
Status: DISCONTINUED | OUTPATIENT
Start: 2017-10-17 | End: 2017-10-25

## 2017-10-16 RX ADMIN — HUMAN INSULIN 3 UNITS/HR: 100 INJECTION, SOLUTION SUBCUTANEOUS at 22:04

## 2017-10-16 RX ADMIN — NAFCILLIN SODIUM 2 G: 2 INJECTION, POWDER, LYOPHILIZED, FOR SOLUTION INTRAMUSCULAR; INTRAVENOUS at 10:00

## 2017-10-16 RX ADMIN — ACETAMINOPHEN 1000 MG: 160 SOLUTION ORAL at 22:03

## 2017-10-16 RX ADMIN — NYSTATIN 500000 UNITS: 100000 SUSPENSION ORAL at 00:04

## 2017-10-16 RX ADMIN — ACETAMINOPHEN 1000 MG: 160 SOLUTION ORAL at 11:30

## 2017-10-16 RX ADMIN — NYSTATIN 500000 UNITS: 100000 SUSPENSION ORAL at 12:22

## 2017-10-16 RX ADMIN — NAFCILLIN SODIUM 2 G: 2 INJECTION, POWDER, LYOPHILIZED, FOR SOLUTION INTRAMUSCULAR; INTRAVENOUS at 16:06

## 2017-10-16 RX ADMIN — METOPROLOL TARTRATE 5 MG: 5 INJECTION INTRAVENOUS at 14:46

## 2017-10-16 RX ADMIN — POTASSIUM CHLORIDE 20 MEQ: 1.5 POWDER, FOR SOLUTION ORAL at 02:06

## 2017-10-16 RX ADMIN — NYSTATIN 500000 UNITS: 100000 SUSPENSION ORAL at 18:09

## 2017-10-16 RX ADMIN — NYSTATIN 500000 UNITS: 100000 SUSPENSION ORAL at 06:15

## 2017-10-16 RX ADMIN — ASPIRIN 81 MG CHEWABLE TABLET 81 MG: 81 TABLET CHEWABLE at 07:54

## 2017-10-16 RX ADMIN — HUMAN INSULIN 3 UNITS/HR: 100 INJECTION, SOLUTION SUBCUTANEOUS at 05:29

## 2017-10-16 RX ADMIN — GENTAMICIN SULFATE 80 MG: 80 INJECTION, SOLUTION INTRAVENOUS at 08:59

## 2017-10-16 RX ADMIN — ACETAMINOPHEN 1000 MG: 160 SOLUTION ORAL at 16:15

## 2017-10-16 RX ADMIN — NAFCILLIN SODIUM 2 G: 2 INJECTION, POWDER, LYOPHILIZED, FOR SOLUTION INTRAMUSCULAR; INTRAVENOUS at 22:03

## 2017-10-16 RX ADMIN — NAFCILLIN SODIUM 2 G: 2 INJECTION, POWDER, LYOPHILIZED, FOR SOLUTION INTRAMUSCULAR; INTRAVENOUS at 06:15

## 2017-10-16 RX ADMIN — NAFCILLIN SODIUM 2 G: 2 INJECTION, POWDER, LYOPHILIZED, FOR SOLUTION INTRAMUSCULAR; INTRAVENOUS at 03:07

## 2017-10-16 RX ADMIN — MULTIVITAMIN 15 ML: LIQUID ORAL at 07:55

## 2017-10-16 RX ADMIN — FAMOTIDINE 20 MG: 10 INJECTION, SOLUTION INTRAVENOUS at 11:20

## 2017-10-16 RX ADMIN — NAFCILLIN SODIUM 2 G: 2 INJECTION, POWDER, LYOPHILIZED, FOR SOLUTION INTRAMUSCULAR; INTRAVENOUS at 18:11

## 2017-10-16 RX ADMIN — HUMAN INSULIN 3 UNITS/HR: 100 INJECTION, SOLUTION SUBCUTANEOUS at 16:05

## 2017-10-16 RX ADMIN — POTASSIUM CHLORIDE 20 MEQ: 200 INJECTION, SOLUTION INTRAVENOUS at 14:45

## 2017-10-16 RX ADMIN — DEXMEDETOMIDINE HYDROCHLORIDE 0.1 MCG/KG/HR: 4 INJECTION, SOLUTION INTRAVENOUS at 22:03

## 2017-10-16 RX ADMIN — LEVOFLOXACIN 750 MG: 5 INJECTION, SOLUTION INTRAVENOUS at 11:19

## 2017-10-16 ASSESSMENT — VISUAL ACUITY
OU: OTHER (SEE COMMENT)

## 2017-10-16 NOTE — PLAN OF CARE
Problem: Patient Care Overview  Goal: Plan of Care/Patient Progress Review  Outcome: Improving  D/I/A: Patient admitted on 10/6/17 for Endocarditis.   Neuro- Predecex at 0.1 mcg/kg/hr. Pupils round and reactive to light, 2-5 mm. Grimaces with painful stimuli. Withdraws LUE to pain. Not following commands.   CV- Sinus tach with bigeminal PVCs. - 130. MAP > 65 without intervention.   Resp- Lung sounds coarse to clear/diminished with suctioning. Vent mode CMV with PEEP 5.0 and FiO2 60%. PS trial this AM.   GI- Bowel sounds auscultated X 4. TF at goal. FWF increased to 125 mL/hr due to elevated serum sodium.   - Espinoza -175/hr.   ID- Tmax 101.7 (bladder). WBC 18.6, trending down. Blood cultures X 2 sent. Antibiotics given per order.

## 2017-10-16 NOTE — PROGRESS NOTES
Neuroscience Intensive Care Progress Note    10/16/2017  Mr. Cricket Miguel is a 62 year old gentleman w/ h/o AVR and ascending aortic repair in 2016, 1st degree AVB present, observing with serial EKGs. admitted with acute mental status changes found to have large moises-aortic fluid collection and multiple strokes likely septic emboli. The strokes involved the L cerebellar, L MCA, and R occipital regions. Small microhemorrhages were observed.  Concern for moises-infarct edema leading to expansion of the cerebellar infarct. Cerebral angiogram showed no evidence of mycotic aneurysm. NeuroICU and Neurosurgery following.  Hemodynamically stable off pressors.  Blood cultures growing MSSA now clearing intermittently.  Likely source of L knee septic arthritis which underwent arthroscopic irrigation and debridement 10/8.  On nafcillin/levaquin/gent - appreciate ID assistance.  JOSE present but improving.  Current plan for non-contrast CT abdomen early this week to evaluate prior signs of R renal pyelonephritis. Tagged WBC scan later this week.  Possible surgery as early as the week of Oct 23rd.  Attempting to lighten sedation and attempt extubation this week though he has been slow to awaken from sedation likely due to his inflammatory state in the setting of multiple strokes.  He is also increasingly febrile over the last two days.  His antipyretic regimen was intensified today.  Plan for family meeting this week after the tagged WBC scan to discuss treatment options.    24 hour events:  Pt is off sedation, Small sluggishly reactive pupil bilaterally approximately 2mm - Left oculocephalic & LT Corneal abscent with mild intermittent nystagmus to the RT side , ON PRESURE SUPPORT, has an attack of SVT improved on 5 metoprolol      24 Hour Vital Signs Summary:  Temperatures:  Current - Temp: 101.1  F (38.4  C); Max - Temp  Av.8  F (38.2  C)  Min: 99.9  F (37.7  C)  Max: 101.5  F (38.6  C)  Respiration range: Resp  Avg:  17.4  Min: 16  Max: 20  Pulse range: No Data Recorded  Blood pressure range: Systolic (24hrs), Av , Min:94 , Max:175   ; Diastolic (24hrs), Av, Min:57, Max:98    Pulse oximetry range: SpO2  Av.2 %  Min: 93 %  Max: 97 %    Ventilator Settings  Ventilation Mode: CPAP/PS  FiO2 (%): 50 %  Rate Set (breaths/minute): 16 breaths/min  Tidal Volume Set (mL): 500 mL  PEEP (cm H2O): 5 cmH2O  Pressure Support (cm H2O): 7 cmH2O  Oxygen Concentration (%): 50 %  Resp: 29      Intake/Output Summary (Last 24 hours) at 10/13/17 1808  Last data filed at 10/13/17 1700   Gross per 24 hour   Intake          3939.68 ml   Output             3025 ml   Net           914.68 ml       Arterial Line BP: ()/(55-85) 154/73  MAP:  [69 mmHg-107 mmHg] 97 mmHg  BP - Mean:  [] 111  CVP:  [4 mmHg-6 mmHg] 6 mmHg    Current Medications:    nystatin  500,000 Units Swish & Spit Q6H     gentamicin  80 mg Intravenous Q36H     aspirin  81 mg Oral or Feeding Tube Daily     nafcillin  2 g Intravenous Q4H     polyethylene glycol  17 g Oral Daily     levofloxacin  750 mg Intravenous Q48H     famotidine  20 mg Intravenous Q24H     influenza quadrivalent (PF) vacc age 3 yrs and older  0.5 mL Intramuscular Prior to discharge     pneumococcal vaccine  0.5 mL Intramuscular Prior to discharge     sodium chloride (PF)  3 mL Intravenous Q8H     multivitamins with minerals  15 mL Per Feeding Tube Daily     senna-docusate  1-2 tablet Oral or Feeding Tube BID     sodium chloride (PF)  3 mL Intracatheter Q8H       PRN Medications:  acetaminophen, lidocaine BUFFERED 1 %, ondansetron **OR** ondansetron, hydrALAZINE, sodium chloride (PF), - MEDICATION INSTRUCTIONS -, prochlorperazine **OR** prochlorperazine, naloxone, IV fluid REPLACEMENT ONLY, glucose **OR** dextrose **OR** glucagon, lidocaine (buffered or not buffered), lidocaine 4%, Reason beta blocker order not selected, insulin aspart, albuterol, albuterol, fentaNYL, potassium chloride, potassium  "chloride, potassium chloride, potassium chloride with lidocaine, potassium chloride, magnesium sulfate, magnesium sulfate, potassium phosphate (KPHOS) in D5W IV, potassium phosphate (KPHOS) in D5W IV, potassium phosphate (KPHOS) in D5W IV, potassium phosphate (KPHOS) in D5W IV, potassium phosphate (KPHOS) in D5W IV, diphenhydrAMINE **OR** diphenhydrAMINE    Infusions:    dexmedetomidine 0.1 mcg/kg/hr (10/15/17 2000)     - MEDICATION INSTRUCTIONS -       IV fluid REPLACEMENT ONLY       insulin (regular) 3 Units/hr (10/16/17 0529)     Reason beta blocker order not selected         Allergies   Allergen Reactions     Lisinopril Swelling     One-sided facial swelling after being on lisinopril/HCTZ for one week.        Physical Examination:  /86  Pulse 82  Temp 101.1  F (38.4  C)  Resp 29  Ht 1.727 m (5' 8\")  Wt 90.5 kg (199 lb 8.3 oz)  SpO2 98%  BMI 30.34 kg/m2  Intubated, on sedation. 2mm sluggish pupil - no corneal response on left - brisk response on right. Left oculocephalic absent. Not withdrawing with painful stimuli. Grimace to noxious stimuli, LT UL withdraw intermittentily.      Labs/Studies:  Recent Labs   Lab Test  10/13/17   1553  10/13/17   1406  10/13/17   1204   10/13/17   0357   10/12/17   0446   10/11/17   0312   NA  165*  167*  166*   < >  169*  169*   < >  167*  167*   < >  162*   POTASSIUM  4.1  4.2  4.5   < >  4.5   < >  4.2   --   3.8   CHLORIDE  133*  135*  134*   < >  136*   < >  134*  >125*   --   130*   CO2  27  27  26   < >  26   < >  24   --   24   ANIONGAP  5  6  6   < >  7   < >  9   --   8   GLC  155*  188*  176*   < >  183*   < >  209*   --   201*   BUN  79*  81*  82*   < >  81*   < >  74*   --   60*   CR  3.20*  3.22*  3.16*   < >  3.31*   < >  3.31*   --   3.35*   IZABELLA  7.9*  7.8*  7.7*   < >  7.8*   < >  7.8*   --   8.1*   WBC   --    --    --    --   15.7*   --   11.5*   --   13.4*   RBC   --    --    --    --   3.62*   --   4.08*   --   4.45   HGB   --    --    --    -- "   10.7*   --   12.0*   --   13.4   PLT   --    --    --    --   322   --   266   --   238    < > = values in this interval not displayed.       Recent Labs   Lab Test  10/08/17   0328  10/06/17   1322  10/06/17   1020  05/18/16   0435   INR  1.03  1.25*  1.23*  1.28*   PTT   --   32  32  35           Recent Labs  Lab 10/16/17  0529 10/15/17  0001 10/14/17  2116 10/14/17  0404   PH 7.47* 7.49* 7.48* 7.39   PCO2 32* 33* 32* 42   PO2 93 98 75* 105   HCO3 24 25 24 25   O2PER 60 60 50.0 50.0           Imaging:  Reviewed     Assessment/Plan    - Exam changes noted above  - ASA 81 mg   - off sedation to assess neurologic exam  - hypertonic saline off, Na 162 - can allow to slowly drift down - given exam change, avoid sudden drop in Na. Target -160 aiming towards 155  - Cerebral Angiogram normal  - will continue to follow      Plan discussed with Dr. Johnson.      Lukasz Baker  Neurocritical care Fellow  Pager [phone]

## 2017-10-16 NOTE — PLAN OF CARE
Problem: Patient Care Overview  Goal: Plan of Care/Patient Progress Review  Outcome: No Change  Pt remains neuro unchanged. Intermittently withdraws on L side. Pupils equal and reactive. Grimaces from time to time. Remains on SIMV. LS coarse. Secretions. TF remains at goal. VSS with some hypotensive periods. Had 4 minute run of sustained SVT in the 170s, 5mg of metropolol given and converted to sinus rhythm in the 70s. Otherwise in the 90s-120s with at times bigeminal PVCs/PACs.  Continue to monitor, notify MD with any concerns.

## 2017-10-16 NOTE — PROGRESS NOTES
CV ICU Progress Note    POD: 8 Days Post-Op  LOS: 10    Admission History: Mr. Crikcet Miguel is a 62 year old gentleman w/ h/o AVR and ascending aortic repair in 2016 admitted with acute mental status changes found to have large moises-aortic fluid collection and multiple strokes likely septic emboli.  Concern for moises-infarct edema leading to expansion of the cerebellar infarct.    S/Interval History: No acute events overnight.        O:    Temp: 101.1  F (38.4  C) Temp  Min: 100.6  F (38.1  C)  Max: 101.7  F (38.7  C)  Resp: 24 Resp  Min: 10  Max: 33  SpO2: 99 % SpO2  Min: 94 %  Max: 100 %    No Data Recorded  Heart Rate: 131 Heart Rate  Min: 111  Max: 144  BP: 137/80 Systolic (24hrs), Av , Min:102 , Max:151   Diastolic (24hrs), Av, Min:68, Max:95    Ventilation Mode: SIMV/PS  FiO2 (%): 50 %  Rate Set (breaths/minute): 12 breaths/min  Tidal Volume Set (mL): 500 mL  PEEP (cm H2O): 5 cmH2O  Pressure Support (cm H2O): 10 cmH2O  Oxygen Concentration (%): 50 %  Resp: 24  Date 10/16/17 0700 - 10/17/17 0659   Shift 9296-2341 5912-6193 1519-0960 24 Hour Total   I  N  T  A  K  E   I.V. 114.6   114.6    NG/   400    Enteral 240   240    Shift Total  (mL/kg) 754.6  (8.34)   754.6  (8.34)   O  U  T  P  U  T   Urine 575   575    Shift Total  (mL/kg) 575  (6.35)   575  (6.35)   Weight (kg) 90.5 90.5 90.5 90.5         Past Medical History:   Diagnosis Date     AI (aortic insufficiency)      Aortic root dilatation (H)      AR (aortic regurgitation)     severe     Cardiomyopathy (H)      CHF (congestive heart failure), NYHA class II (H)      COPD, mild (H)     spirometry      Heart murmur      Hyperlipidemia LDL goal <70 10/31/2010     Hypertension goal BP (blood pressure) < 140/90      Inguinal hernia      left lung nodule  2008     Major depressive disorder, single episode, moderate (H)      Psoriasis childhood     Tobacco use disorder        Past Surgical History:   Procedure Laterality Date      ARTHROSCOPY KNEE INCISION AND DRAINAGE Left 10/8/2017    Procedure: ARTHROSCOPY KNEE INCISION AND DRAINAGE;  Arthroscopic Incision and Drainage of Left Knee;  Surgeon: Lucretia Munoz MD;  Location: [facility] OR     HC SHOULDER ARTHROSCOPY, DX  2001    Arthroscopy, Shoulder RT Dr OSIRIS Andersen     HC SHOULDER ARTHROSCOPY, DX  1/04    LT arthroscopy Dr OSIRIS Andersen     HERNIA REPAIR, UMBILICAL  1/08    incarcerated - dr rockwell     HERNIORRHAPHY INGUINAL  12/21/2012    HERNIORRHAPHY INGUINAL;  Right Inguinal Hernia Repair with mesh ;  Surgeon: Mello Rockwell MD;  Location: [facility] OR     REPLACE VALVE AORTIC N/A 5/17/2016    REPLACE VALVE AORTIC - Dr Bowers     ROTATOR CUFF REPAIR RT/LT  11/10    Rt arthroscopy - Dr OSIRIS Andersen     SURGICAL HISTORY OF -   5/16    AVR, severe AR, aortic root repair       Physical Exam    General: Intubated, lightly sedated, in no distress   Neck: Supple, no tracheal deviation  Cardiovascular: Regular tachy, nom/r/g  Pumonary: Non labored breathing on MV.  CTAB   Abdomen: Soft, non distended, non tender.   Ext: W/o edema, wwp  Neuro: Small sluggishly reactive pupil bilaterally approximately 2mm - Left oculocephalic & LT Corneal abscent with mild intermittent nystagmus to the RT side     Lab Results   Component Value Date    WBC 18.3 (H) 10/16/2017    HGB 10.5 (L) 10/16/2017    HCT 32.5 (L) 10/16/2017     10/16/2017     (HH) 10/16/2017    POTASSIUM 3.5 10/16/2017    CHLORIDE 130 (H) 10/16/2017    CO2 22 10/16/2017    BUN 87 (H) 10/16/2017    CR 2.87 (H) 10/16/2017     (H) 10/16/2017    SED 6 05/06/2013    DD 1.7 (H) 04/25/2016    NTBNPI 2768 (H) 04/25/2016    NTBNP 1302 (H) 06/03/2016    TROPONIN <0.07 12/05/2005    TROPI 0.634 (HH) 10/08/2017    AST 24 10/06/2017    ALT 33 10/06/2017    ALKPHOS 85 10/06/2017    BILITOTAL 1.5 (H) 10/06/2017    INR 1.03 10/08/2017           dexmedetomidine 0.1 mcg/kg/hr (10/16/17 0800)     - MEDICATION INSTRUCTIONS -       IV fluid  REPLACEMENT ONLY       insulin (regular) 3 Units/hr (10/16/17 0800)     Reason beta blocker order not selected           Assessment and Plan: Mr. Cricket Miguel is a 62 year old gentleman w/ h/o AVR and ascending aortic repair in 5/2016 admitted with acute mental status changes found to have large moises-aortic fluid collection and multiple strokes likely septic emboli.  Concern for moises-infarct edema leading to expansion of the cerebellar infarct.     Neuro:     Multifocal CVA: L cerebellar, L MCA, and R occipital regions.  Concern for septic emboli.  Small microhemorrhages were observed.  Progressive edema noted.  Neurosurgery and neurocritical care are following.  Angio showed no evidence of mycotic aneurysms.  Neuro exam stable.    ASA 81mg    Acetaminophen, Ibuprofen, Fentanyl prn  Resp:     Intubated and mechanically ventilated.    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  1st degree AVB.  Plan for surgery no earlier than the week of Oct 23rd.     Tagged WBC scan to evaluate the moises-aortic abscess and repeat abd CT to evaluate findings c/w R renal pyelonephritis.    Tachycardia    Considering starting low-dose Esmolol gtt for control  GI/FEN:     Post pyloric feeding tube in place, keep infusing at goal rate.  Renal:     Monitor electrolytes, Cr, and urine output     Lytes goals K>4 and Mag>2    Good urine output. Continue to monitor. Cr progressively decreasing    Hypernatremia: Treated with hypertonic saline but not normalizing without free water.      Continue free water with conservative Na reduction goals of 5 meq per 24hrs. Goal Na+ 145-155 per NCC  Monitor Q2 Na.  Endo:    Glucose checks, SSI to serum glucose >80, <180 mg/dL  ID:     Sepsis: MSSA bacteremia with likely source of the L knee septic arthritis. Knee is s/p arthroscopic irrigation and debridement 10/8. Renal function is improving slowly.   Otherwise hemodynamically stable without pressors.      Nafcillin 2g IV Q4 hours and Levofloxacin 750mg IV Q48 hours. Gentamicin IV daily (x14 days to 10/19/17). Daily blood cultures; need to assess for clearance. Nystatin (oral candidiasis)  Daily cultures.    Monitor WBC daily and acquire daily cultures  Heme:     Hgb stable, no concerns at this time  Prophylaxis: SCDs, Famotidine  Lines:   PIV x2 10 days    CVC 10 days    Espinoza 10 days    A-line 9 days      Dispo: Continue ICU care  Code Status: Full Code    Chris Behrens M.D.  Cardiovascular Anesthesiology Fellow    Patient seen with Dr. Matute staff Intensivist

## 2017-10-16 NOTE — PROGRESS NOTES
CVICU PROGRESS NOTE  10/16/2017  Attending: Ramesh Kuo, *    S:   No acute issues overnight. Continues to be febrile.       O:   Vitals:    10/16/17 0900 10/16/17 1000 10/16/17 1100 10/16/17 1151   BP:  137/80     BP Location:       Pulse:       Resp: 22 27 24    Temp: 101.3  F (38.5  C) 101.1  F (38.4  C) 101.1  F (38.4  C)    TempSrc:       SpO2: 97% 98% 97% 99%   Weight:       Height:         Vitals:    10/14/17 0400 10/15/17 0400 10/16/17 0000   Weight: 92.5 kg (203 lb 14.8 oz) 89.5 kg (197 lb 5 oz) 90.5 kg (199 lb 8.3 oz)       Intake/Output Summary (Last 24 hours) at 10/16/17 1200  Last data filed at 10/16/17 1100   Gross per 24 hour   Intake          4847.24 ml   Output             3005 ml   Net          1842.24 ml       Ventilation Mode: SIMV/PS  FiO2 (%): 50 %  Rate Set (breaths/minute): 12 breaths/min  Tidal Volume Set (mL): 500 mL  PEEP (cm H2O): 5 cmH2O  Pressure Support (cm H2O): 10 cmH2O  Oxygen Concentration (%): 50 %  Resp: 24    Recent Labs  Lab 10/16/17  0529 10/15/17  0001 10/14/17  2116 10/14/17  0404   PH 7.47* 7.49* 7.48* 7.39   PCO2 32* 33* 32* 42   PO2 93 98 75* 105   HCO3 24 25 24 25   O2PER 60 60 50.0 50.0       MAPs:   General: Intubated, lightly sedated  Neck: Supple, no tracheal deviation  Cardiovascular: RRR  Pumonary: Non labored breathing on MV  Abdomen: Soft, non distended, non tender  Ext: minimal edema, warm  Neuro: moving all extremities spontaneously      Labs:  [unfilled]  GLUCOSE:     Recent Labs  Lab 10/16/17  0804 10/16/17  0619 10/16/17  0515 10/16/17  0402 10/16/17  0323 10/16/17  0311 10/16/17  0213  10/16/17  0007  10/15/17  2155  10/15/17  1945  10/15/17  1625   *  --   --   --  140*  --   --   --  153*  --  180*  --  158*  --  174*   * 141* 136* 139*  --  134* 130*  < >  --   < >  --   < >  --   < >  --    < > = values in this interval not displayed.      Imaging:  None recent      A/P:    Cricket Miguel is a 62 year old with history of AVR and  ascending aortic repair in May 2016 presented on 10/6 with acute mental status changes found to have large moises-aortic fluid collection and multiple strokes likely septic emboli.    Neuro: tylenol scheduled for fevers,continue neuro checks Q2H,  precedex and wake up as able  Resp: inability to protect airway requiring mechanical ventilation, on minimal vent settings, pressure support trials, respiratory rate decreased to 12, could wean to extubate if mental status improves   CV: goal normotension per neurology/neurosurgery  GI/FEN: tube feeds to goal, continue free water for hypernatremia (goal 145-155), will recheck BMPs  Renal: JOSE non-oliguric, clinically euvolemic   Endo: Insulin gtt for glucose control  ID: continue current antibiotic regimen pending ID recommendations, daily blood cultures negative since 10/12  Heme: no bleeding concerns at this time, hold anticoagulation per neuro and neurosurgery  Prophylaxis: H2 blocker, no chemical dvt ppx due to evolving stroke  Lines: will try to obtain PICC and then DC CVL  Dispo: CV-ICU      Rula Contreras  General Surgery Resident  Cardiothoracic Surgery  [phone]

## 2017-10-16 NOTE — PLAN OF CARE
Problem: Patient Care Overview  Goal: Plan of Care/Patient Progress Review  Discharge Planner PT   Patient plan for discharge: Unable to state, pts wife anticipates TCU or ARU  Current status: Pt orally intubuated, not sedated, on VC-SIMV with 50% FiO2, pt unresponsive and does not follow commands at this time.  Barriers to return to prior living situation: Pt currently intubated and unresponsive.  Recommendations for discharge: TCU  Rationale for recommendations: Pt currently intubated, not sedated, and unresponsive, PT performing PROM.        Entered by: Izabela Weber 10/16/2017 6:05 PM

## 2017-10-16 NOTE — PLAN OF CARE
Problem: Patient Care Overview  Goal: Plan of Care/Patient Progress Review  No overt changes in patient's condition during my shift. Patient remains minimally responsive to painful stimuli. Patient will, however, move his head in the direction of the extremity that pain is being applied and left foot has upward inflection of the great toe when a Babinski reflex is tested. Pupils are otherwise intact with nystagmus present in the right pupil, physicians are aware of this. Will continue to monitor for safety and comfort.     Jewel Mckenna RN

## 2017-10-17 ENCOUNTER — APPOINTMENT (OUTPATIENT)
Dept: GENERAL RADIOLOGY | Facility: CLINIC | Age: 62
DRG: 004 | End: 2017-10-17
Attending: NURSE PRACTITIONER
Payer: COMMERCIAL

## 2017-10-17 ENCOUNTER — ALLIED HEALTH/NURSE VISIT (OUTPATIENT)
Dept: NEUROLOGY | Facility: CLINIC | Age: 62
DRG: 004 | End: 2017-10-17
Attending: PSYCHIATRY & NEUROLOGY
Payer: COMMERCIAL

## 2017-10-17 DIAGNOSIS — G93.40 ENCEPHALOPATHY: Primary | ICD-10-CM

## 2017-10-17 LAB
ANION GAP SERPL CALCULATED.3IONS-SCNC: 4 MMOL/L (ref 3–14)
ANION GAP SERPL CALCULATED.3IONS-SCNC: 6 MMOL/L (ref 3–14)
ANION GAP SERPL CALCULATED.3IONS-SCNC: 6 MMOL/L (ref 3–14)
ANION GAP SERPL CALCULATED.3IONS-SCNC: 7 MMOL/L (ref 3–14)
ANION GAP SERPL CALCULATED.3IONS-SCNC: 7 MMOL/L (ref 3–14)
ANION GAP SERPL CALCULATED.3IONS-SCNC: 8 MMOL/L (ref 3–14)
BACTERIA SPEC CULT: ABNORMAL
BACTERIA SPEC CULT: ABNORMAL
BUN SERPL-MCNC: 76 MG/DL (ref 7–30)
BUN SERPL-MCNC: 77 MG/DL (ref 7–30)
BUN SERPL-MCNC: 79 MG/DL (ref 7–30)
BUN SERPL-MCNC: 79 MG/DL (ref 7–30)
BUN SERPL-MCNC: 80 MG/DL (ref 7–30)
BUN SERPL-MCNC: 82 MG/DL (ref 7–30)
CALCIUM SERPL-MCNC: 7.1 MG/DL (ref 8.5–10.1)
CALCIUM SERPL-MCNC: 7.3 MG/DL (ref 8.5–10.1)
CALCIUM SERPL-MCNC: 7.3 MG/DL (ref 8.5–10.1)
CALCIUM SERPL-MCNC: 7.4 MG/DL (ref 8.5–10.1)
CHLORIDE SERPL-SCNC: 127 MMOL/L (ref 94–109)
CHLORIDE SERPL-SCNC: 127 MMOL/L (ref 94–109)
CHLORIDE SERPL-SCNC: 128 MMOL/L (ref 94–109)
CHLORIDE SERPL-SCNC: 128 MMOL/L (ref 94–109)
CHLORIDE SERPL-SCNC: 130 MMOL/L (ref 94–109)
CHLORIDE SERPL-SCNC: 130 MMOL/L (ref 94–109)
CO2 SERPL-SCNC: 22 MMOL/L (ref 20–32)
CO2 SERPL-SCNC: 23 MMOL/L (ref 20–32)
CO2 SERPL-SCNC: 23 MMOL/L (ref 20–32)
CO2 SERPL-SCNC: 24 MMOL/L (ref 20–32)
CO2 SERPL-SCNC: 25 MMOL/L (ref 20–32)
CO2 SERPL-SCNC: 26 MMOL/L (ref 20–32)
CREAT SERPL-MCNC: 2.46 MG/DL (ref 0.66–1.25)
CREAT SERPL-MCNC: 2.46 MG/DL (ref 0.66–1.25)
CREAT SERPL-MCNC: 2.49 MG/DL (ref 0.66–1.25)
CREAT SERPL-MCNC: 2.56 MG/DL (ref 0.66–1.25)
CREAT SERPL-MCNC: 2.61 MG/DL (ref 0.66–1.25)
CREAT SERPL-MCNC: 2.69 MG/DL (ref 0.66–1.25)
ERYTHROCYTE [DISTWIDTH] IN BLOOD BY AUTOMATED COUNT: 17.3 % (ref 10–15)
GFR SERPL CREATININE-BSD FRML MDRD: 24 ML/MIN/1.7M2
GFR SERPL CREATININE-BSD FRML MDRD: 25 ML/MIN/1.7M2
GFR SERPL CREATININE-BSD FRML MDRD: 25 ML/MIN/1.7M2
GFR SERPL CREATININE-BSD FRML MDRD: 26 ML/MIN/1.7M2
GFR SERPL CREATININE-BSD FRML MDRD: 27 ML/MIN/1.7M2
GFR SERPL CREATININE-BSD FRML MDRD: 27 ML/MIN/1.7M2
GLUCOSE BLDC GLUCOMTR-MCNC: 107 MG/DL (ref 70–99)
GLUCOSE BLDC GLUCOMTR-MCNC: 111 MG/DL (ref 70–99)
GLUCOSE BLDC GLUCOMTR-MCNC: 111 MG/DL (ref 70–99)
GLUCOSE BLDC GLUCOMTR-MCNC: 112 MG/DL (ref 70–99)
GLUCOSE BLDC GLUCOMTR-MCNC: 113 MG/DL (ref 70–99)
GLUCOSE BLDC GLUCOMTR-MCNC: 119 MG/DL (ref 70–99)
GLUCOSE BLDC GLUCOMTR-MCNC: 120 MG/DL (ref 70–99)
GLUCOSE BLDC GLUCOMTR-MCNC: 121 MG/DL (ref 70–99)
GLUCOSE BLDC GLUCOMTR-MCNC: 127 MG/DL (ref 70–99)
GLUCOSE BLDC GLUCOMTR-MCNC: 127 MG/DL (ref 70–99)
GLUCOSE BLDC GLUCOMTR-MCNC: 129 MG/DL (ref 70–99)
GLUCOSE BLDC GLUCOMTR-MCNC: 143 MG/DL (ref 70–99)
GLUCOSE SERPL-MCNC: 119 MG/DL (ref 70–99)
GLUCOSE SERPL-MCNC: 123 MG/DL (ref 70–99)
GLUCOSE SERPL-MCNC: 125 MG/DL (ref 70–99)
GLUCOSE SERPL-MCNC: 130 MG/DL (ref 70–99)
GLUCOSE SERPL-MCNC: 134 MG/DL (ref 70–99)
GLUCOSE SERPL-MCNC: 155 MG/DL (ref 70–99)
HCT VFR BLD AUTO: 30.1 % (ref 40–53)
HGB BLD-MCNC: 9.6 G/DL (ref 13.3–17.7)
INTERPRETATION ECG - MUSE: NORMAL
MCH RBC QN AUTO: 29.7 PG (ref 26.5–33)
MCHC RBC AUTO-ENTMCNC: 31.9 G/DL (ref 31.5–36.5)
MCV RBC AUTO: 93 FL (ref 78–100)
OSMOLALITY SERPL: 351 MMOL/KG (ref 280–301)
OSMOLALITY SERPL: 352 MMOL/KG (ref 280–301)
OSMOLALITY SERPL: 359 MMOL/KG (ref 280–301)
OSMOLALITY SERPL: 363 MMOL/KG (ref 280–301)
PLATELET # BLD AUTO: 287 10E9/L (ref 150–450)
POTASSIUM SERPL-SCNC: 3.5 MMOL/L (ref 3.4–5.3)
POTASSIUM SERPL-SCNC: 3.6 MMOL/L (ref 3.4–5.3)
POTASSIUM SERPL-SCNC: 3.6 MMOL/L (ref 3.4–5.3)
POTASSIUM SERPL-SCNC: 4.2 MMOL/L (ref 3.4–5.3)
RBC # BLD AUTO: 3.23 10E12/L (ref 4.4–5.9)
SODIUM SERPL-SCNC: 157 MMOL/L (ref 133–144)
SODIUM SERPL-SCNC: 158 MMOL/L (ref 133–144)
SODIUM SERPL-SCNC: 159 MMOL/L (ref 133–144)
SODIUM SERPL-SCNC: 160 MMOL/L (ref 133–144)
SODIUM SERPL-SCNC: 161 MMOL/L (ref 133–144)
SPECIMEN SOURCE: ABNORMAL
WBC # BLD AUTO: 15.8 10E9/L (ref 4–11)

## 2017-10-17 PROCEDURE — 25000132 ZZH RX MED GY IP 250 OP 250 PS 637: Performed by: THORACIC SURGERY (CARDIOTHORACIC VASCULAR SURGERY)

## 2017-10-17 PROCEDURE — 80048 BASIC METABOLIC PNL TOTAL CA: CPT | Performed by: STUDENT IN AN ORGANIZED HEALTH CARE EDUCATION/TRAINING PROGRAM

## 2017-10-17 PROCEDURE — 25000125 ZZHC RX 250: Performed by: THORACIC SURGERY (CARDIOTHORACIC VASCULAR SURGERY)

## 2017-10-17 PROCEDURE — 27210437 ZZH NUTRITION PRODUCT SEMIELEM INTERMED LITER

## 2017-10-17 PROCEDURE — 25000132 ZZH RX MED GY IP 250 OP 250 PS 637: Performed by: STUDENT IN AN ORGANIZED HEALTH CARE EDUCATION/TRAINING PROGRAM

## 2017-10-17 PROCEDURE — 40000014 ZZH STATISTIC ARTERIAL MONITORING DAILY

## 2017-10-17 PROCEDURE — 84295 ASSAY OF SERUM SODIUM: CPT | Performed by: STUDENT IN AN ORGANIZED HEALTH CARE EDUCATION/TRAINING PROGRAM

## 2017-10-17 PROCEDURE — 87040 BLOOD CULTURE FOR BACTERIA: CPT | Performed by: STUDENT IN AN ORGANIZED HEALTH CARE EDUCATION/TRAINING PROGRAM

## 2017-10-17 PROCEDURE — 99291 CRITICAL CARE FIRST HOUR: CPT | Mod: GC | Performed by: ANESTHESIOLOGY

## 2017-10-17 PROCEDURE — S0028 INJECTION, FAMOTIDINE, 20 MG: HCPCS | Performed by: THORACIC SURGERY (CARDIOTHORACIC VASCULAR SURGERY)

## 2017-10-17 PROCEDURE — 83930 ASSAY OF BLOOD OSMOLALITY: CPT | Performed by: STUDENT IN AN ORGANIZED HEALTH CARE EDUCATION/TRAINING PROGRAM

## 2017-10-17 PROCEDURE — 00000146 ZZHCL STATISTIC GLUCOSE BY METER IP

## 2017-10-17 PROCEDURE — 40000275 ZZH STATISTIC RCP TIME EA 10 MIN

## 2017-10-17 PROCEDURE — 85027 COMPLETE CBC AUTOMATED: CPT | Performed by: STUDENT IN AN ORGANIZED HEALTH CARE EDUCATION/TRAINING PROGRAM

## 2017-10-17 PROCEDURE — S0032 INJECTION, NAFCILLIN SODIUM: HCPCS | Performed by: THORACIC SURGERY (CARDIOTHORACIC VASCULAR SURGERY)

## 2017-10-17 PROCEDURE — 25000128 H RX IP 250 OP 636: Performed by: NEUROLOGICAL SURGERY

## 2017-10-17 PROCEDURE — 25000132 ZZH RX MED GY IP 250 OP 250 PS 637: Performed by: SURGERY

## 2017-10-17 PROCEDURE — 20000004 ZZH R&B ICU UMMC

## 2017-10-17 PROCEDURE — 25000128 H RX IP 250 OP 636: Performed by: THORACIC SURGERY (CARDIOTHORACIC VASCULAR SURGERY)

## 2017-10-17 PROCEDURE — 36415 COLL VENOUS BLD VENIPUNCTURE: CPT | Performed by: STUDENT IN AN ORGANIZED HEALTH CARE EDUCATION/TRAINING PROGRAM

## 2017-10-17 PROCEDURE — 94003 VENT MGMT INPAT SUBQ DAY: CPT

## 2017-10-17 PROCEDURE — 71010 XR CHEST PORT 1 VW: CPT

## 2017-10-17 PROCEDURE — 40000196 ZZH STATISTIC RAPCV CVP MONITORING

## 2017-10-17 PROCEDURE — 95951 ZZHC EEG VIDEO < 12 HR: CPT | Mod: 52,ZF

## 2017-10-17 RX ORDER — SODIUM CHLORIDE 9 MG/ML
INJECTION, SOLUTION INTRAVENOUS
Status: DISCONTINUED
Start: 2017-10-17 | End: 2017-10-27 | Stop reason: HOSPADM

## 2017-10-17 RX ADMIN — NYSTATIN 500000 UNITS: 100000 SUSPENSION ORAL at 06:02

## 2017-10-17 RX ADMIN — NYSTATIN 500000 UNITS: 100000 SUSPENSION ORAL at 23:46

## 2017-10-17 RX ADMIN — GENTAMICIN SULFATE 80 MG: 80 INJECTION, SOLUTION INTRAVENOUS at 09:07

## 2017-10-17 RX ADMIN — POTASSIUM CHLORIDE 20 MEQ: 1.5 POWDER, FOR SOLUTION ORAL at 21:58

## 2017-10-17 RX ADMIN — NAFCILLIN SODIUM 2 G: 2 INJECTION, POWDER, LYOPHILIZED, FOR SOLUTION INTRAMUSCULAR; INTRAVENOUS at 02:34

## 2017-10-17 RX ADMIN — NAFCILLIN SODIUM 2 G: 2 INJECTION, POWDER, LYOPHILIZED, FOR SOLUTION INTRAMUSCULAR; INTRAVENOUS at 06:02

## 2017-10-17 RX ADMIN — ACETAMINOPHEN 1000 MG: 160 SOLUTION ORAL at 16:13

## 2017-10-17 RX ADMIN — NAFCILLIN SODIUM 2 G: 2 INJECTION, POWDER, LYOPHILIZED, FOR SOLUTION INTRAMUSCULAR; INTRAVENOUS at 21:43

## 2017-10-17 RX ADMIN — ACETAMINOPHEN 1000 MG: 160 SOLUTION ORAL at 04:38

## 2017-10-17 RX ADMIN — NYSTATIN 500000 UNITS: 100000 SUSPENSION ORAL at 00:16

## 2017-10-17 RX ADMIN — HUMAN INSULIN 3 UNITS/HR: 100 INJECTION, SOLUTION SUBCUTANEOUS at 10:19

## 2017-10-17 RX ADMIN — NYSTATIN 500000 UNITS: 100000 SUSPENSION ORAL at 11:41

## 2017-10-17 RX ADMIN — HUMAN INSULIN 3 UNITS/HR: 100 INJECTION, SOLUTION SUBCUTANEOUS at 19:43

## 2017-10-17 RX ADMIN — ACETAMINOPHEN 1000 MG: 160 SOLUTION ORAL at 10:09

## 2017-10-17 RX ADMIN — ACETAMINOPHEN 1000 MG: 160 SOLUTION ORAL at 22:05

## 2017-10-17 RX ADMIN — FAMOTIDINE 20 MG: 10 INJECTION, SOLUTION INTRAVENOUS at 11:41

## 2017-10-17 RX ADMIN — NAFCILLIN SODIUM 2 G: 2 INJECTION, POWDER, LYOPHILIZED, FOR SOLUTION INTRAMUSCULAR; INTRAVENOUS at 18:09

## 2017-10-17 RX ADMIN — NAFCILLIN SODIUM 2 G: 2 INJECTION, POWDER, LYOPHILIZED, FOR SOLUTION INTRAMUSCULAR; INTRAVENOUS at 10:09

## 2017-10-17 RX ADMIN — NYSTATIN 500000 UNITS: 100000 SUSPENSION ORAL at 18:09

## 2017-10-17 RX ADMIN — HYDRALAZINE HYDROCHLORIDE 10 MG: 20 INJECTION INTRAMUSCULAR; INTRAVENOUS at 20:59

## 2017-10-17 RX ADMIN — POTASSIUM CHLORIDE 20 MEQ: 200 INJECTION, SOLUTION INTRAVENOUS at 02:34

## 2017-10-17 RX ADMIN — NAFCILLIN SODIUM 2 G: 2 INJECTION, POWDER, LYOPHILIZED, FOR SOLUTION INTRAMUSCULAR; INTRAVENOUS at 14:07

## 2017-10-17 RX ADMIN — MULTIVITAMIN 15 ML: LIQUID ORAL at 07:37

## 2017-10-17 RX ADMIN — ASPIRIN 81 MG CHEWABLE TABLET 81 MG: 81 TABLET CHEWABLE at 07:37

## 2017-10-17 ASSESSMENT — VISUAL ACUITY
OU: OTHER (SEE COMMENT)

## 2017-10-17 NOTE — PHARMACY-AMINOGLYCOSIDE DOSING SERVICE
Pharmacy Aminoglycoside Follow-Up Note  Date of Service 2017  Patient's  1955   62 year old, male    Weight (Adjusted): 78.2 kg    Indication: Endocarditis  Current Gentamicin regimen:  80 mg IV q36h  Day of therapy: 11    Target goals based on synergy dosing  Goal Peak level: 3-5 mg/L  Goal Trough level: <1 mg/L    Current estimated CrCl: Estimated Creatinine Clearance: 29.8 mL/min (based on Cr of 2.73).    Creatinine for last 3 days  10/13/2017:  9:50 PM Creatinine 3.18 mg/dL; 11:59 PM Creatinine 3.18 mg/dL  10/14/2017:  2:01 AM Creatinine 3.24 mg/dL;  4:04 AM Creatinine 3.21 mg/dL;  6:24 AM Creatinine 3.16 mg/dL;  8:13 AM Creatinine 3.23 mg/dL; 10:27 AM Creatinine 3.23 mg/dL; 12:06 PM Creatinine 3.19 mg/dL;  2:07 PM Creatinine 3.23 mg/dL;  4:39 PM Creatinine 3.21 mg/dL;  6:02 PM Creatinine 3.24 mg/dL;  8:14 PM Creatinine 3.22 mg/dL;  9:16 PM Creatinine 3.39 mg/dL  10/15/2017: 12:01 AM Creatinine 3.31 mg/dL;  2:09 AM Creatinine 3.24 mg/dL;  4:12 AM Creatinine 3.25 mg/dL;  7:52 AM Creatinine 3.28 mg/dL;  9:57 AM Creatinine 3.10 mg/dL; 12:35 PM Creatinine 3.01 mg/dL;  4:25 PM Creatinine 3.08 mg/dL;  7:45 PM Creatinine 2.98 mg/dL;  9:55 PM Creatinine 3.04 mg/dL  10/16/2017: 12:07 AM Creatinine 2.99 mg/dL;  3:23 AM Creatinine 2.71 mg/dL;  8:04 AM Creatinine 2.87 mg/dL; 12:26 PM Creatinine 2.77 mg/dL;  4:07 PM Creatinine 2.73 mg/dL    Nephrotoxins and other renal medications (Future)    Start     Dose/Rate Route Frequency Ordered Stop    10/17/17 0859  gentamicin (GARAMYCIN) infusion 80 mg      80 mg  over 60 Minutes Intravenous EVERY 24 HOURS 10/16/17 2101      10/12/17 1800  nafcillin IV 2 g vial to attach to  ml bag      2 g  over 1 Hours Intravenous EVERY 4 HOURS 10/12/17 1419            Contrast Orders - past 72 hours     None          Aminoglycoside Levels - past 2 days  10/16/2017: 12:50 PM Gentamicin Level 2.5 mg/L;  6:14 PM Gentamicin Level 1.4 mg/L    Aminoglycosides IV  Administrations (past 72 hours)                   gentamicin (GARAMYCIN) infusion 80 mg (mg) 80 mg New Bag 10/16/17 0859     80 mg New Bag 10/14/17 0520                Pharmacokinetic Analysis  Calculated Peak level: 3.4 mg/L  Calculated Trough level: 0.08 mg/L  Volume of distribution: 0.29 L/kg  Half-life: 6.5 hours          Interpretation of levels and current regimen:  Aminoglycoside peak and trough are meeting goals, however, patient appears to be clearing the medication faster (renal function appears to be slowly improving), so Q24H dosing is likely more appropriate at this time. Predict with changing to 80 mg IV Q24H, we will maintain Peak 3-5 and still have Trough <1    Has serum creatinine changed greater than 50% in the last 72 hours: No  Urine output:  good urine output  Renal function: Improving    Plan  1. Increase dose to 80 mg IV Q24H    2.  Method of evaluation: 2 post dose levels    3. Pharmacy will continue to follow and check levels  as appropriate in 3-5 Days    Patricia Rodriguez, PharmD, BCPS  Pager [phone]

## 2017-10-17 NOTE — PROGRESS NOTES
CLINICAL NUTRITION SERVICES - progress toward nutrition POC. See 10/13 note for full assessment.    -FEN/Meds: On Impact Peptide @ goal 60 ml/hr (1440 ml/day) to provide 2160 kcals (75% MREE or 29 kcal/kg/day), 135 g PRO (1.8 g/kg/day). Was previously on propofol w/ additional kcals, but has been off x ~3 days. Note, minimum assessed needs are 80% MREE.     Interventions  -Enteral Nutrition - D/t d/c of propofol, increased TF rate to Impact Peptide @ goal 65 ml/hr (1560 ml/day) to provide 2340 kcals (82% MREE or 32 kcal/kg/day), 147 g PRO (2 g/kg/day), 1201 ml free H2O, 100 g Fat (50% from MCTs), 218 g CHO and no Fiber daily.    RD will continue to follow.    Maricel Childers, RD, LD, [facility]  CVICU Dietitian  Pager: [phone]

## 2017-10-17 NOTE — PROGRESS NOTES
CV ICU   Progress Note:    S/Interval History: One self limited episode of SVT yesterday. Daily blood cultures now negative for 4 days.      O:  Temp: 99.3  F (37.4  C) Temp  Min: 99.3  F (37.4  C)  Max: 100.9  F (38.3  C)  Resp: 18 Resp  Min: 15  Max: 33  SpO2: 100 % SpO2  Min: 94 %  Max: 100 %    No Data Recorded  Heart Rate: 90 Heart Rate  Min: 73  Max: 129  BP: 116/79 Systolic (24hrs), Av , Min:95 , Max:134   Diastolic (24hrs), Av, Min:60, Max:92    Ventilation Mode: CPAP/PS  FiO2 (%): 50 %  Rate Set (breaths/minute): 12 breaths/min  Tidal Volume Set (mL): 500 mL  PEEP (cm H2O): 5 cmH2O  Pressure Support (cm H2O): 10 cmH2O  Oxygen Concentration (%): 50 %  Resp: 18     Date 10/17/17 0700 - 10/18/17 0659   Shift 9125-3026 5906-8327 0985-3767 24 Hour Total   I  N  T  A  K  E   I.V. 137.3   137.3    NG/   180    Enteral 120   120    Shift Total  (mL/kg) 437.3  (4.83)   437.3  (4.83)   O  U  T  P  U  T   Urine 250   250    Shift Total  (mL/kg) 250  (2.76)   250  (2.76)   Weight (kg) 90.5 90.5 90.5 90.5     Physical Exam    General: Intubated, mechanically ventilated, non responsive   Neck: Supple, no tracheal deviation  Cardiovascular: RRR  Pumonary: Non labored breathing on MV. Mild crackles bilaterally   Abdomen: Soft, non distended, non tender.   Ext: No peripheral edema, appropriate distal perfusion   Neuro: Not moving right upper and lower extremity     Lab Results   Component Value Date    WBC 15.8 (H) 10/17/2017    HGB 9.6 (L) 10/17/2017    HCT 30.1 (L) 10/17/2017     10/17/2017     (H) 10/17/2017    POTASSIUM 3.6 10/17/2017    CHLORIDE 128 (H) 10/17/2017    CO2 22 10/17/2017    BUN 79 (H) 10/17/2017    CR 2.56 (H) 10/17/2017     (H) 10/17/2017    SED 6 2013    DD 1.7 (H) 2016    NTBNPI 2768 (H) 2016    NTBNP 1302 (H) 2016    TROPONIN <0.07 2005    TROPI 0.634 (HH) 10/08/2017    AST 24 10/06/2017    ALT 33 10/06/2017    ALKPHOS 85 10/06/2017     BILITOTAL 1.5 (H) 10/06/2017    INR 1.03 10/08/2017         Recent Labs  Lab 10/16/17  0529 10/15/17  0001 10/14/17  2116 10/14/17  0404   PH 7.47* 7.49* 7.48* 7.39   PCO2 32* 33* 32* 42   PO2 93 98 75* 105   HCO3 24 25 24 25   O2PER 60 60 50.0 50.0         A/P:   Cricket Miguel is a 62 year old with history of AVR and ascending aortic repair in May 2016 presented on 10/6 with acute mental status changes found to have large moises-aortic fluid collection and multiple strokes likely septic emboli.    Neuro: Tylenol scheduled for fevers,continue neuro checks Q2H,  precedex and wake up as able. Okay to use ibuprofen for fevers if needed per neuro. Will consider MRI due to ?declining neuro exam.   Resp: Inability to protect airway requiring mechanical ventilation, on minimal vent settings, pressure support trials, respiratory rate decreased to 12, could wean to extubate if mental status improves.   CV: Goal normotension per neurology/neurosurgery. ASA.  GI/FEN: Tube feeds to goal, continue free water for hypernatremia (goal 145-155, go slow per neuro), will recheck BMPs Q6. +Bowel function  Renal: JOSE non-oliguric, clinically euvolemic   Endo: Insulin gtt for glucose control  ID: Continue current antibiotic regimen pending ID recommendations (Nafcillin, levofloxacin, gentamycin), daily blood cultures negative since 10/13. WBC slowly decreasing. WBC scan today.   Heme: No bleeding concerns at this time, hold anticoagulation per neuro and neurosurgery  Prophylaxis: H2 blocker, no chemical dvt ppx due to evolving stroke  Dispo: CVICU    Patient seen with Dr. Matute, staff Intensivist      Sukhwinder Mejía MD  Anesthesiology Resident   [phone]

## 2017-10-17 NOTE — MR AVS SNAPSHOT
After Visit Summary   10/17/2017    Cricket Miguel    MRN: [id number]           Patient Information     Date Of Birth          1955        Visit Information        Provider Department      10/17/2017 5:00 PM P EEG TECH 4 [facility]        Today's Diagnoses     Encephalopathy    -  1       Follow-ups after your visit        Your next 10 appointments already scheduled     Oct 18, 2017  7:00 AM CDT   24 Hour [facility] Visit with [facility] TECH 4   [facility]    [address] [id number] [phone]           [place]: Your appointment is scheduled at [facility]. [address] [place] [id number]            Oct 19, 2017  7:30 AM CDT   PET ONCOLOGY WHOLE BODY with UUPET1   [facility] [place] PET CT ([facility] [place])    [address] [facility] [id number] [phone]           Tell your doctor:   If there is any chance you may be pregnant or if you are breastfeeding.   If you have problems lying in small spaces (claustrophobia). If you do, your doctor may give you medicine to help you relax. If you have diabetes:   Have your exam early in the morning. Your blood glucose will go up as the day goes by.   Your glucose level must be 180 or less at the start of the exam. Please take any medicines you need to ensure this blood glucose level. 24 hours before your scan: Don t do any heavy exercise. (No jogging, aerobics or other workouts.) Exercise will make your pictures less accurate. 6 hours before your scan:   Stop all food and liquids (except water).   Do not chew gum or suck on mints.   If you need to take medicine with food, you may take it with a few crackers.  Please call your Imaging Department at your exam site with any questions.              Who to contact     Please call your clinic at [phone] to:    Ask questions about your  health    Make or cancel appointments    Discuss your medicines    Learn about your test results    Speak to your doctor   If you have compliments or concerns about an experience at your clinic, or if you wish to file a complaint, please contact [facility] Patient Relations at [phone] or email us at [email or web address]         Additional Information About Your Visit        MyChart Information     [employer] gives you secure access to your electronic health record. If you see a primary care provider, you can also send messages to your care team and make appointments. If you have questions, please call your primary care clinic.  If you do not have a primary care provider, please call [phone] and they will assist you.      [employer] is an electronic gateway that provides easy, online access to your medical records. With [employer], you can request a clinic appointment, read your test results, renew a prescription or communicate with your care team.     To access your existing account, please contact your [facility] or call [phone] for assistance.        Care EveryWhere ID     This is your Care EveryWhere ID. This could be used by other organizations to access your [facility] medical records  [id number]         Blood Pressure from Last 3 Encounters:   10/17/17 122/78   10/06/17 125/86   02/06/17 132/84    Weight from Last 3 Encounters:   10/16/17 90.5 kg (199 lb 8.3 oz)   02/06/17 91.6 kg (202 lb)   01/09/17 87.5 kg (193 lb)              Today, you had the following     No orders found for display         Today's Medication Changes      Notice     This visit is during an admission. Changes to the med list made in this visit will be reflected in the After Visit Summary of the admission.             Primary Care Provider Office Phone # Fax #    Dipak Gordillo [phone]       [address] [place] [address]        Equal  Access to Services     [facility]: Hadii aad ku hadmelindatamika Almodovar, wajessicamalik kelly, aleksanderosmany usmikifreddie samson. So [facility] [phone].    ATENCIÓN: Si habla español, tiene a mendiola disposición servicios gratuitos de asistencia lingüística. Llame al [phone].    We comply with applicable federal civil rights laws and [place] laws. We do not discriminate on the basis of race, color, national origin, age, disability, sex, sexual orientation, or gender identity.            Thank you!     Thank you for choosing [facility]  for your care. Our goal is always to provide you with excellent care. Hearing back from our patients is one way we can continue to improve our services. Please take a few minutes to complete the written survey that you may receive in the mail after your visit with us. Thank you!             Your Updated Medication List - Protect others around you: Learn how to safely use, store and throw away your medicines at www.disposemymeds.org.      Notice     This visit is during an admission. Changes to the med list made in this visit will be reflected in the After Visit Summary of the admission.

## 2017-10-17 NOTE — PROGRESS NOTES
Neuroscience Intensive Care Progress Note    10/17/2017  Mr. Cricket Miguel is a 62 year old gentleman w/ h/o AVR and ascending aortic repair in 2016, 1st degree AVB present, observing with serial EKGs. admitted with acute mental status changes found to have large moises-aortic fluid collection and multiple strokes likely septic emboli. The strokes involved the L cerebellar, L MCA, and R occipital regions. Small microhemorrhages were observed.  Concern for moises-infarct edema leading to expansion of the cerebellar infarct. Cerebral angiogram showed no evidence of mycotic aneurysm. NeuroICU and Neurosurgery following.  Hemodynamically stable off pressors.  Blood cultures growing MSSA now clearing intermittently.  Likely source of L knee septic arthritis which underwent arthroscopic irrigation and debridement 10/8.  On nafcillin/levaquin/gent - appreciate ID assistance.  JOSE present but improving.  Current plan for non-contrast CT abdomen early this week to evaluate prior signs of R renal pyelonephritis. Tagged WBC scan later this week.  Possible surgery as early as the week of Oct 23rd.  Attempting to lighten sedation and attempt extubation this week though he has been slow to awaken from sedation likely due to his inflammatory state in the setting of multiple strokes.  He is also increasingly febrile over the last two days.  His antipyretic regimen was intensified today.  Plan for family meeting this week after the tagged WBC scan to discuss treatment options.    24 hour events:  Pt is off sedation, Small sluggishly reactive pupil bilaterally approximately 2mm - Left oculocephalic & LT Corneal abscent with mild intermittent nystagmus to the RT side , ON PRESURE SUPPORT, has an attack of SVT 10/16 improved on 5 metoprolol       24 Hour Vital Signs Summary:  Temperatures:  Current - Temp: 101.1  F (38.4  C); Max - Temp  Av.8  F (38.2  C)  Min: 99.9  F (37.7  C)  Max: 101.5  F (38.6  C)  Respiration range: Resp   Av.4  Min: 16  Max: 20  Pulse range: No Data Recorded  Blood pressure range: Systolic (24hrs), Av , Min:94 , Max:175   ; Diastolic (24hrs), Av, Min:57, Max:98    Pulse oximetry range: SpO2  Av.2 %  Min: 93 %  Max: 97 %    Ventilator Settings  Ventilation Mode: CPAP/PS  FiO2 (%): 50 %  Rate Set (breaths/minute): 12 breaths/min  Tidal Volume Set (mL): 500 mL  PEEP (cm H2O): 5 cmH2O  Pressure Support (cm H2O): 10 cmH2O  Oxygen Concentration (%): 40 %  Resp: 19      Intake/Output Summary (Last 24 hours) at 10/13/17 1808  Last data filed at 10/13/17 1700   Gross per 24 hour   Intake          3939.68 ml   Output             3025 ml   Net           914.68 ml       Arterial Line BP: ()/(51-81) 137/71  MAP:  [64 mmHg-102 mmHg] 92 mmHg  BP - Mean:  [] 78  CVP:  [8 mmHg] 8 mmHg    Current Medications:    acetaminophen  1,000 mg Oral or Feeding Tube Q6H     gentamicin  80 mg Intravenous Q24H     nystatin  500,000 Units Swish & Spit Q6H     aspirin  81 mg Oral or Feeding Tube Daily     nafcillin  2 g Intravenous Q4H     polyethylene glycol  17 g Oral Daily     levofloxacin  750 mg Intravenous Q48H     famotidine  20 mg Intravenous Q24H     influenza quadrivalent (PF) vacc age 3 yrs and older  0.5 mL Intramuscular Prior to discharge     pneumococcal vaccine  0.5 mL Intramuscular Prior to discharge     sodium chloride (PF)  3 mL Intravenous Q8H     multivitamins with minerals  15 mL Per Feeding Tube Daily     senna-docusate  1-2 tablet Oral or Feeding Tube BID     sodium chloride (PF)  3 mL Intracatheter Q8H       PRN Medications:  HOLD MEDICATION, lidocaine BUFFERED 1 %, ondansetron **OR** ondansetron, hydrALAZINE, sodium chloride (PF), - MEDICATION INSTRUCTIONS -, prochlorperazine **OR** prochlorperazine, naloxone, IV fluid REPLACEMENT ONLY, glucose **OR** dextrose **OR** glucagon, lidocaine (buffered or not buffered), lidocaine 4%, Reason beta blocker order not selected, insulin aspart, albuterol,  "albuterol, fentaNYL, potassium chloride, potassium chloride, potassium chloride, potassium chloride with lidocaine, potassium chloride, magnesium sulfate, magnesium sulfate, potassium phosphate (KPHOS) in D5W IV, potassium phosphate (KPHOS) in D5W IV, potassium phosphate (KPHOS) in D5W IV, potassium phosphate (KPHOS) in D5W IV, potassium phosphate (KPHOS) in D5W IV, diphenhydrAMINE **OR** diphenhydrAMINE    Infusions:    dexmedetomidine 0.1 mcg/kg/hr (10/17/17 1600)     - MEDICATION INSTRUCTIONS -       IV fluid REPLACEMENT ONLY       insulin (regular) 3 Units/hr (10/17/17 1600)     Reason beta blocker order not selected         Allergies   Allergen Reactions     Lisinopril Swelling     One-sided facial swelling after being on lisinopril/HCTZ for one week.        Physical Examination:  /69  Pulse 82  Temp 100  F (37.8  C) (Bladder)  Resp 19  Ht 1.727 m (5' 8\")  Wt 90.5 kg (199 lb 8.3 oz)  SpO2 100%  BMI 30.34 kg/m2  Intubated, on sedation. 2mm sluggish pupil - no corneal response on left - brisk response on right. Left oculocephalic absent. Not withdrawing with painful stimuli. Grimace to noxious stimuli, LT UL withdraw intermittentily.      Labs/Studies:  Recent Labs   Lab Test  10/13/17   1553  10/13/17   1406  10/13/17   1204   10/13/17   0357   10/12/17   0446   10/11/17   0312   NA  165*  167*  166*   < >  169*  169*   < >  167*  167*   < >  162*   POTASSIUM  4.1  4.2  4.5   < >  4.5   < >  4.2   --   3.8   CHLORIDE  133*  135*  134*   < >  136*   < >  134*  >125*   --   130*   CO2  27  27  26   < >  26   < >  24   --   24   ANIONGAP  5  6  6   < >  7   < >  9   --   8   GLC  155*  188*  176*   < >  183*   < >  209*   --   201*   BUN  79*  81*  82*   < >  81*   < >  74*   --   60*   CR  3.20*  3.22*  3.16*   < >  3.31*   < >  3.31*   --   3.35*   IZABELLA  7.9*  7.8*  7.7*   < >  7.8*   < >  7.8*   --   8.1*   WBC   --    --    --    --   15.7*   --   11.5*   --   13.4*   RBC   --    --    --    --   " 3.62*   --   4.08*   --   4.45   HGB   --    --    --    --   10.7*   --   12.0*   --   13.4   PLT   --    --    --    --   322   --   266   --   238    < > = values in this interval not displayed.       Recent Labs   Lab Test  10/08/17   0328  10/06/17   1322  10/06/17   1020  05/18/16   0435   INR  1.03  1.25*  1.23*  1.28*   PTT   --   32  32  35           Recent Labs  Lab 10/16/17  0529 10/15/17  0001 10/14/17  2116 10/14/17  0404   PH 7.47* 7.49* 7.48* 7.39   PCO2 32* 33* 32* 42   PO2 93 98 75* 105   HCO3 24 25 24 25   O2PER 60 60 50.0 50.0           Imaging:  Reviewed     Assessment/Plan    - Exam changes noted above  - ASA 81 mg   - off sedation to assess neurologic exam  - hypertonic saline off, Na 158 - can allow to slowly drift down - given exam change, avoid sudden drop in Na. Target -160 aiming towards 155  - Cerebral Angiogram normal  - Hook to VEEG  -  May need a follow up brain imaging tomorrow   - Family discussion on Thursday  - will continue to follow      Plan discussed with Dr. Brambila.      Lukasz Baker  Neurocritical care Fellow  Pager [phone]

## 2017-10-17 NOTE — PROGRESS NOTES
Care Coordinator Progress Note     Admission Date/Time:  10/6/2017  Attending MD:  Ramesh Kuo, *     Data  Chart reviewed, discussed with interdisciplinary team.   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    Assessment  Pt remain in ICU vented and sedated.  Care conf. arranged for Thursday, 10/19 at 2:00 to update family and discuss the plan of care.  Team members that have been informed about the care conf are; CVICU (  #[id number] and his team. Dr. Kuo will be on the conference call), CVICU (Dr. Matute # [id number] and his team), Neuro ICU ( Dr. Baker) and bed side RN, Brionna.  CC visited pt jose alfredo, Meghna and conformed the care conf. Date and time.  Meghna agreed and stated she will inform pt dtrs and son too.     Plan  Anticipated Discharge Date:  TBD.  Anticipated Discharge Plan:   TBD.  Care conf. arranged for Thursday, 10/19 at 2:00 in 4A conf. Room.      Matthew Keller, RN, PHN, BSN  4A and 4E/ ICU  Care Coordinator  Phone: [phone]  Pager: [phone]

## 2017-10-17 NOTE — PLAN OF CARE
Problem: Patient Care Overview  Goal: Plan of Care/Patient Progress Review  Neuro: Pupils equal and reactive. Intermittently grimaces and opens eyes, nothing to command. Withdraws on L side.  Respiratory: Remains on CPAP setting on vent. LS coarse, moderate secretions.  GI/: Good UO. TF at 65. Tolerating well. Rectal pouch, stool output.  Sinus tach. VSS. Tmax 38.0  Continue to monitor, notify MD with any concerns.

## 2017-10-17 NOTE — PROGRESS NOTES
CV SURGERY PROGRESS NOTE  10/17/2017  Attending: Ramesh Kuo, *    S:   No acute issues overnight. Continues to be febrile. PST most of the day yesterday. One episode of SVT yesterday that resolved with 5mg metoprolol. Cultures with NGTD x4 days at this point. WBC continues to slowly trend down. Vent settings remain minimal. Planning for WBC scan today.      O:   Vitals:    10/17/17 0500 10/17/17 0600 10/17/17 0700 10/17/17 0800   BP:  133/88  116/79   BP Location:    Right arm   Pulse:       Resp: 23 20 15 18   Temp: 99.5  F (37.5  C) 99.3  F (37.4  C) 99.3  F (37.4  C) 99.3  F (37.4  C)   TempSrc:    Bladder   SpO2: 98% 98% 100% 100%   Weight:       Height:         Vitals:    10/14/17 0400 10/15/17 0400 10/16/17 0000   Weight: 92.5 kg (203 lb 14.8 oz) 89.5 kg (197 lb 5 oz) 90.5 kg (199 lb 8.3 oz)       Intake/Output Summary (Last 24 hours) at 10/17/17 0843  Last data filed at 10/17/17 0800   Gross per 24 hour   Intake           4358.9 ml   Output             3075 ml   Net           1283.9 ml     Ventilation Mode: CMV/AC  FiO2 (%): 50 %  Rate Set (breaths/minute): 12 breaths/min  Tidal Volume Set (mL): 500 mL  PEEP (cm H2O): 5 cmH2O  Pressure Support (cm H2O): 10 cmH2O  Oxygen Concentration (%): 50 %  Resp: 18    Recent Labs  Lab 10/16/17  0529 10/15/17  0001 10/14/17  2116 10/14/17  0404   PH 7.47* 7.49* 7.48* 7.39   PCO2 32* 33* 32* 42   PO2 93 98 75* 105   HCO3 24 25 24 25   O2PER 60 60 50.0 50.0       MAPs:   General: Intubated, lightly sedated, does not follow commands. Intermittently opens eyes spontaneously.   Neck: Supple, no tracheal deviation  Cardiovascular: RRR  Pumonary: Non labored breathing on MV  Abdomen: Soft, non distended, non tender  Ext: Minimal edema, warm  Neuro: Does not follow commands      Labs:  GLUCOSE:     Recent Labs  Lab 10/17/17  0805 10/17/17  0744 10/17/17  0619 10/17/17  0448 10/17/17  0441 10/17/17  0236 10/17/17  0030 10/17/17  0024 10/16/17  2235  10/16/17  2035   10/16/17  1607  10/16/17  1226   GLC  --  125*  --   --  130*  --  155*  --   --   --  133*  --  142*  --  179*   *  --  112* 119*  --  121*  --  143* 133*  < >  --   < >  --   < >  --    < > = values in this interval not displayed.      Imaging:  Stable CXR today.       A/P:    Cricket Miguel is a 62 year old with history of AVR and ascending aortic repair in May 2016 presented on 10/6 with acute mental status changes found to have large moises-aortic fluid collection and multiple strokes likely septic emboli.    Neuro: Tylenol scheduled for fevers,continue neuro checks Q2H,  precedex and wake up as able. Okay to use ibuprofen for fevers if needed per neuro. Will consider MRI due to ?declining neuro exam.   Resp: Inability to protect airway requiring mechanical ventilation, on minimal vent settings, pressure support trials, respiratory rate decreased to 12, could wean to extubate if mental status improves.   CV: Goal normotension per neurology/neurosurgery. ASA.  GI/FEN: Tube feeds to goal, continue free water for hypernatremia (goal 145-155, go slow per neuro), will recheck BMPs Q6. +Bowel function  Renal: JOSE non-oliguric, clinically euvolemic   Endo: Insulin gtt for glucose control  ID: Continue current antibiotic regimen pending ID recommendations (Nafcillin, levofloxacin, gentamycin), daily blood cultures negative since 10/13. WBC slowly decreasing. WBC scan today.   Heme: No bleeding concerns at this time, hold anticoagulation per neuro and neurosurgery  Prophylaxis: H2 blocker, no chemical dvt ppx due to evolving stroke  Dispo: CVICU        Patient discussed with staff.    ____  Clemencia Appiah PA-C  Transplant Surgery  Pager: [phone]

## 2017-10-17 NOTE — PLAN OF CARE
"Problem: Patient Care Overview  Goal: Plan of Care/Patient Progress Review  D/I: Patient admitted to unit 4A Surgical/Neuro ICU with sepsis possible endocarditis and CVAs  /88  Pulse 82  Temp 99.3  F (37.4  C)  Resp 20  Ht 1.727 m (5' 8\")  Wt 90.5 kg (199 lb 8.3 oz)  SpO2 98%  BMI 30.34 kg/m2  Neuro- Pt withdraws slightly on left.  Pt opens eyes, gags and coughs.    CV- HR .  Minimal ectopy overnight.  SBP >120.  CVP 8  Tm 38.3  Respiratory- Remains on Vent 50%. Moderate to large amount of secretions   GI-Tube feedings at goal.  Liquid stool in rectal pouch  - Good UOP  Cr trending down  Gtts- Insulin and Precedex  Restraints- continue restraint on LUE, pt unable to follow commands   See flowsheets for further interventions and assessments.   A: Stable  P: Continue to monitor pt closely. Notify MD of significant changes.              "

## 2017-10-18 ENCOUNTER — ALLIED HEALTH/NURSE VISIT (OUTPATIENT)
Dept: NEUROLOGY | Facility: CLINIC | Age: 62
DRG: 004 | End: 2017-10-18
Attending: PSYCHIATRY & NEUROLOGY
Payer: COMMERCIAL

## 2017-10-18 DIAGNOSIS — G93.40 ENCEPHALOPATHY: Primary | ICD-10-CM

## 2017-10-18 LAB
ANION GAP SERPL CALCULATED.3IONS-SCNC: 8 MMOL/L (ref 3–14)
ANION GAP SERPL CALCULATED.3IONS-SCNC: 9 MMOL/L (ref 3–14)
BACTERIA SPEC CULT: ABNORMAL
BACTERIA SPEC CULT: NO GROWTH
BASE DEFICIT BLDA-SCNC: 0.5 MMOL/L
BUN SERPL-MCNC: 70 MG/DL (ref 7–30)
BUN SERPL-MCNC: 70 MG/DL (ref 7–30)
BUN SERPL-MCNC: 71 MG/DL (ref 7–30)
BUN SERPL-MCNC: 73 MG/DL (ref 7–30)
BUN SERPL-MCNC: 74 MG/DL (ref 7–30)
BUN SERPL-MCNC: 74 MG/DL (ref 7–30)
CALCIUM SERPL-MCNC: 7 MG/DL (ref 8.5–10.1)
CALCIUM SERPL-MCNC: 7.1 MG/DL (ref 8.5–10.1)
CALCIUM SERPL-MCNC: 7.2 MG/DL (ref 8.5–10.1)
CALCIUM SERPL-MCNC: 7.2 MG/DL (ref 8.5–10.1)
CALCIUM SERPL-MCNC: 7.3 MG/DL (ref 8.5–10.1)
CALCIUM SERPL-MCNC: 7.6 MG/DL (ref 8.5–10.1)
CHLORIDE SERPL-SCNC: 127 MMOL/L (ref 94–109)
CHLORIDE SERPL-SCNC: 127 MMOL/L (ref 94–109)
CHLORIDE SERPL-SCNC: 128 MMOL/L (ref 94–109)
CHLORIDE SERPL-SCNC: 130 MMOL/L (ref 94–109)
CO2 SERPL-SCNC: 22 MMOL/L (ref 20–32)
CO2 SERPL-SCNC: 23 MMOL/L (ref 20–32)
CO2 SERPL-SCNC: 23 MMOL/L (ref 20–32)
CREAT SERPL-MCNC: 2.3 MG/DL (ref 0.66–1.25)
CREAT SERPL-MCNC: 2.32 MG/DL (ref 0.66–1.25)
CREAT SERPL-MCNC: 2.33 MG/DL (ref 0.66–1.25)
CREAT SERPL-MCNC: 2.39 MG/DL (ref 0.66–1.25)
CREAT SERPL-MCNC: 2.46 MG/DL (ref 0.66–1.25)
CREAT SERPL-MCNC: 2.47 MG/DL (ref 0.66–1.25)
ERYTHROCYTE [DISTWIDTH] IN BLOOD BY AUTOMATED COUNT: 17.3 % (ref 10–15)
GFR SERPL CREATININE-BSD FRML MDRD: 26 ML/MIN/1.7M2
GFR SERPL CREATININE-BSD FRML MDRD: 27 ML/MIN/1.7M2
GFR SERPL CREATININE-BSD FRML MDRD: 27 ML/MIN/1.7M2
GFR SERPL CREATININE-BSD FRML MDRD: 28 ML/MIN/1.7M2
GFR SERPL CREATININE-BSD FRML MDRD: 28 ML/MIN/1.7M2
GFR SERPL CREATININE-BSD FRML MDRD: 29 ML/MIN/1.7M2
GLUCOSE BLDC GLUCOMTR-MCNC: 105 MG/DL (ref 70–99)
GLUCOSE BLDC GLUCOMTR-MCNC: 115 MG/DL (ref 70–99)
GLUCOSE BLDC GLUCOMTR-MCNC: 116 MG/DL (ref 70–99)
GLUCOSE BLDC GLUCOMTR-MCNC: 121 MG/DL (ref 70–99)
GLUCOSE BLDC GLUCOMTR-MCNC: 127 MG/DL (ref 70–99)
GLUCOSE BLDC GLUCOMTR-MCNC: 127 MG/DL (ref 70–99)
GLUCOSE BLDC GLUCOMTR-MCNC: 128 MG/DL (ref 70–99)
GLUCOSE BLDC GLUCOMTR-MCNC: 128 MG/DL (ref 70–99)
GLUCOSE BLDC GLUCOMTR-MCNC: 132 MG/DL (ref 70–99)
GLUCOSE BLDC GLUCOMTR-MCNC: 134 MG/DL (ref 70–99)
GLUCOSE SERPL-MCNC: 126 MG/DL (ref 70–99)
GLUCOSE SERPL-MCNC: 129 MG/DL (ref 70–99)
GLUCOSE SERPL-MCNC: 137 MG/DL (ref 70–99)
GLUCOSE SERPL-MCNC: 139 MG/DL (ref 70–99)
GLUCOSE SERPL-MCNC: 143 MG/DL (ref 70–99)
GLUCOSE SERPL-MCNC: 153 MG/DL (ref 70–99)
HCO3 BLD-SCNC: 23 MMOL/L (ref 21–28)
HCT VFR BLD AUTO: 26.2 % (ref 40–53)
HGB BLD-MCNC: 8.5 G/DL (ref 13.3–17.7)
MAGNESIUM SERPL-MCNC: 2.8 MG/DL (ref 1.6–2.3)
MCH RBC QN AUTO: 29.6 PG (ref 26.5–33)
MCHC RBC AUTO-ENTMCNC: 32.4 G/DL (ref 31.5–36.5)
MCV RBC AUTO: 91 FL (ref 78–100)
O2/TOTAL GAS SETTING VFR VENT: 40 %
OSMOLALITY SERPL: 344 MMOL/KG (ref 280–301)
OSMOLALITY SERPL: 349 MMOL/KG (ref 280–301)
OSMOLALITY SERPL: 349 MMOL/KG (ref 280–301)
OSMOLALITY SERPL: 350 MMOL/KG (ref 280–301)
PCO2 BLD: 32 MM HG (ref 35–45)
PH BLD: 7.47 PH (ref 7.35–7.45)
PHOSPHATE SERPL-MCNC: 2.9 MG/DL (ref 2.5–4.5)
PLATELET # BLD AUTO: 297 10E9/L (ref 150–450)
PO2 BLD: 100 MM HG (ref 80–105)
POTASSIUM SERPL-SCNC: 3.5 MMOL/L (ref 3.4–5.3)
POTASSIUM SERPL-SCNC: 3.6 MMOL/L (ref 3.4–5.3)
POTASSIUM SERPL-SCNC: 3.7 MMOL/L (ref 3.4–5.3)
POTASSIUM SERPL-SCNC: 3.8 MMOL/L (ref 3.4–5.3)
RBC # BLD AUTO: 2.87 10E12/L (ref 4.4–5.9)
SODIUM SERPL-SCNC: 157 MMOL/L (ref 133–144)
SODIUM SERPL-SCNC: 157 MMOL/L (ref 133–144)
SODIUM SERPL-SCNC: 158 MMOL/L (ref 133–144)
SODIUM SERPL-SCNC: 158 MMOL/L (ref 133–144)
SODIUM SERPL-SCNC: 159 MMOL/L (ref 133–144)
SODIUM SERPL-SCNC: 160 MMOL/L (ref 133–144)
SODIUM SERPL-SCNC: 161 MMOL/L (ref 133–144)
SPECIMEN SOURCE: ABNORMAL
SPECIMEN SOURCE: NORMAL
WBC # BLD AUTO: 14.7 10E9/L (ref 4–11)

## 2017-10-18 PROCEDURE — 25000132 ZZH RX MED GY IP 250 OP 250 PS 637: Performed by: STUDENT IN AN ORGANIZED HEALTH CARE EDUCATION/TRAINING PROGRAM

## 2017-10-18 PROCEDURE — 80048 BASIC METABOLIC PNL TOTAL CA: CPT | Performed by: STUDENT IN AN ORGANIZED HEALTH CARE EDUCATION/TRAINING PROGRAM

## 2017-10-18 PROCEDURE — 83735 ASSAY OF MAGNESIUM: CPT | Performed by: STUDENT IN AN ORGANIZED HEALTH CARE EDUCATION/TRAINING PROGRAM

## 2017-10-18 PROCEDURE — 83930 ASSAY OF BLOOD OSMOLALITY: CPT | Performed by: STUDENT IN AN ORGANIZED HEALTH CARE EDUCATION/TRAINING PROGRAM

## 2017-10-18 PROCEDURE — 00000146 ZZHCL STATISTIC GLUCOSE BY METER IP

## 2017-10-18 PROCEDURE — S0032 INJECTION, NAFCILLIN SODIUM: HCPCS | Performed by: THORACIC SURGERY (CARDIOTHORACIC VASCULAR SURGERY)

## 2017-10-18 PROCEDURE — 84100 ASSAY OF PHOSPHORUS: CPT | Performed by: STUDENT IN AN ORGANIZED HEALTH CARE EDUCATION/TRAINING PROGRAM

## 2017-10-18 PROCEDURE — 25000125 ZZHC RX 250: Performed by: THORACIC SURGERY (CARDIOTHORACIC VASCULAR SURGERY)

## 2017-10-18 PROCEDURE — 99291 CRITICAL CARE FIRST HOUR: CPT | Mod: GC | Performed by: ANESTHESIOLOGY

## 2017-10-18 PROCEDURE — 25000132 ZZH RX MED GY IP 250 OP 250 PS 637: Performed by: SURGERY

## 2017-10-18 PROCEDURE — 87040 BLOOD CULTURE FOR BACTERIA: CPT | Performed by: STUDENT IN AN ORGANIZED HEALTH CARE EDUCATION/TRAINING PROGRAM

## 2017-10-18 PROCEDURE — 82803 BLOOD GASES ANY COMBINATION: CPT | Performed by: ORTHOPAEDIC SURGERY

## 2017-10-18 PROCEDURE — 25000128 H RX IP 250 OP 636: Performed by: THORACIC SURGERY (CARDIOTHORACIC VASCULAR SURGERY)

## 2017-10-18 PROCEDURE — 84295 ASSAY OF SERUM SODIUM: CPT | Performed by: STUDENT IN AN ORGANIZED HEALTH CARE EDUCATION/TRAINING PROGRAM

## 2017-10-18 PROCEDURE — 40000014 ZZH STATISTIC ARTERIAL MONITORING DAILY

## 2017-10-18 PROCEDURE — 25000128 H RX IP 250 OP 636: Performed by: NEUROLOGICAL SURGERY

## 2017-10-18 PROCEDURE — 95951 ZZHC EEG VIDEO EACH 24 HR: CPT | Mod: ZF

## 2017-10-18 PROCEDURE — 40000275 ZZH STATISTIC RCP TIME EA 10 MIN

## 2017-10-18 PROCEDURE — 36415 COLL VENOUS BLD VENIPUNCTURE: CPT | Performed by: STUDENT IN AN ORGANIZED HEALTH CARE EDUCATION/TRAINING PROGRAM

## 2017-10-18 PROCEDURE — 85027 COMPLETE CBC AUTOMATED: CPT | Performed by: STUDENT IN AN ORGANIZED HEALTH CARE EDUCATION/TRAINING PROGRAM

## 2017-10-18 PROCEDURE — S0028 INJECTION, FAMOTIDINE, 20 MG: HCPCS | Performed by: THORACIC SURGERY (CARDIOTHORACIC VASCULAR SURGERY)

## 2017-10-18 PROCEDURE — 25000132 ZZH RX MED GY IP 250 OP 250 PS 637: Performed by: THORACIC SURGERY (CARDIOTHORACIC VASCULAR SURGERY)

## 2017-10-18 PROCEDURE — 27210437 ZZH NUTRITION PRODUCT SEMIELEM INTERMED LITER

## 2017-10-18 PROCEDURE — 94003 VENT MGMT INPAT SUBQ DAY: CPT

## 2017-10-18 PROCEDURE — 20000004 ZZH R&B ICU UMMC

## 2017-10-18 PROCEDURE — 25000125 ZZHC RX 250: Performed by: STUDENT IN AN ORGANIZED HEALTH CARE EDUCATION/TRAINING PROGRAM

## 2017-10-18 RX ORDER — SODIUM CHLORIDE, SODIUM LACTATE, POTASSIUM CHLORIDE, CALCIUM CHLORIDE 600; 310; 30; 20 MG/100ML; MG/100ML; MG/100ML; MG/100ML
INJECTION, SOLUTION INTRAVENOUS
Status: DISCONTINUED
Start: 2017-10-18 | End: 2017-10-27 | Stop reason: HOSPADM

## 2017-10-18 RX ORDER — POLYETHYLENE GLYCOL 3350 17 G/17G
17 POWDER, FOR SOLUTION ORAL DAILY PRN
Status: DISCONTINUED | OUTPATIENT
Start: 2017-10-18 | End: 2017-11-04 | Stop reason: HOSPADM

## 2017-10-18 RX ORDER — AMOXICILLIN 250 MG
1-2 CAPSULE ORAL 2 TIMES DAILY PRN
Status: DISCONTINUED | OUTPATIENT
Start: 2017-10-18 | End: 2017-11-04 | Stop reason: HOSPADM

## 2017-10-18 RX ADMIN — POTASSIUM CHLORIDE 20 MEQ: 1.5 POWDER, FOR SOLUTION ORAL at 21:59

## 2017-10-18 RX ADMIN — GENTAMICIN SULFATE 80 MG: 80 INJECTION, SOLUTION INTRAVENOUS at 08:51

## 2017-10-18 RX ADMIN — ACETAMINOPHEN 1000 MG: 160 SOLUTION ORAL at 16:07

## 2017-10-18 RX ADMIN — ACETAMINOPHEN 1000 MG: 160 SOLUTION ORAL at 22:06

## 2017-10-18 RX ADMIN — NYSTATIN 500000 UNITS: 100000 SUSPENSION ORAL at 18:09

## 2017-10-18 RX ADMIN — HUMAN INSULIN 2 UNITS/HR: 100 INJECTION, SOLUTION SUBCUTANEOUS at 05:41

## 2017-10-18 RX ADMIN — ACETAMINOPHEN 1000 MG: 160 SOLUTION ORAL at 09:59

## 2017-10-18 RX ADMIN — POTASSIUM CHLORIDE 20 MEQ: 1.5 POWDER, FOR SOLUTION ORAL at 06:17

## 2017-10-18 RX ADMIN — FAMOTIDINE 20 MG: 10 INJECTION, SOLUTION INTRAVENOUS at 09:59

## 2017-10-18 RX ADMIN — FENTANYL CITRATE 25 MCG: 50 INJECTION, SOLUTION INTRAMUSCULAR; INTRAVENOUS at 23:28

## 2017-10-18 RX ADMIN — NYSTATIN 500000 UNITS: 100000 SUSPENSION ORAL at 12:35

## 2017-10-18 RX ADMIN — DEXMEDETOMIDINE HYDROCHLORIDE 0.4 MCG/KG/HR: 4 INJECTION, SOLUTION INTRAVENOUS at 02:45

## 2017-10-18 RX ADMIN — NYSTATIN 500000 UNITS: 100000 SUSPENSION ORAL at 05:43

## 2017-10-18 RX ADMIN — LEVOFLOXACIN 750 MG: 5 INJECTION, SOLUTION INTRAVENOUS at 12:34

## 2017-10-18 RX ADMIN — ACETAMINOPHEN 1000 MG: 160 SOLUTION ORAL at 04:41

## 2017-10-18 RX ADMIN — HUMAN INSULIN 1.5 UNITS/HR: 100 INJECTION, SOLUTION SUBCUTANEOUS at 20:15

## 2017-10-18 RX ADMIN — NAFCILLIN SODIUM 2 G: 2 INJECTION, POWDER, LYOPHILIZED, FOR SOLUTION INTRAMUSCULAR; INTRAVENOUS at 09:59

## 2017-10-18 RX ADMIN — NAFCILLIN SODIUM 2 G: 2 INJECTION, POWDER, LYOPHILIZED, FOR SOLUTION INTRAMUSCULAR; INTRAVENOUS at 18:10

## 2017-10-18 RX ADMIN — HYDRALAZINE HYDROCHLORIDE 10 MG: 20 INJECTION INTRAMUSCULAR; INTRAVENOUS at 20:27

## 2017-10-18 RX ADMIN — NAFCILLIN SODIUM 2 G: 2 INJECTION, POWDER, LYOPHILIZED, FOR SOLUTION INTRAMUSCULAR; INTRAVENOUS at 02:22

## 2017-10-18 RX ADMIN — NAFCILLIN SODIUM 2 G: 2 INJECTION, POWDER, LYOPHILIZED, FOR SOLUTION INTRAMUSCULAR; INTRAVENOUS at 06:01

## 2017-10-18 RX ADMIN — ASPIRIN 81 MG CHEWABLE TABLET 81 MG: 81 TABLET CHEWABLE at 07:33

## 2017-10-18 RX ADMIN — FENTANYL CITRATE 25 MCG: 50 INJECTION, SOLUTION INTRAMUSCULAR; INTRAVENOUS at 22:50

## 2017-10-18 RX ADMIN — NAFCILLIN SODIUM 2 G: 2 INJECTION, POWDER, LYOPHILIZED, FOR SOLUTION INTRAMUSCULAR; INTRAVENOUS at 21:59

## 2017-10-18 RX ADMIN — NAFCILLIN SODIUM 2 G: 2 INJECTION, POWDER, LYOPHILIZED, FOR SOLUTION INTRAMUSCULAR; INTRAVENOUS at 14:06

## 2017-10-18 RX ADMIN — MULTIVITAMIN 15 ML: LIQUID ORAL at 07:33

## 2017-10-18 RX ADMIN — POTASSIUM CHLORIDE 20 MEQ: 200 INJECTION, SOLUTION INTRAVENOUS at 17:17

## 2017-10-18 RX ADMIN — FENTANYL CITRATE 50 MCG: 50 INJECTION, SOLUTION INTRAMUSCULAR; INTRAVENOUS at 01:04

## 2017-10-18 ASSESSMENT — VISUAL ACUITY
OU: OTHER (SEE COMMENT)

## 2017-10-18 NOTE — PLAN OF CARE
Problem: Patient Care Overview  Goal: Plan of Care/Patient Progress Review  Slightly more awake today, opens eyes spontaneously. Not withdrawing. LS coarse. Copious secretions. CPAP/PS all day. VSS. Good UO. Continue to monitor.  WBC scan in AM, care conference tomorrow.

## 2017-10-18 NOTE — PLAN OF CARE
Problem: Patient Care Overview  Goal: Plan of Care/Patient Progress Review  Outcome: No Change  -Neuro: PEERL, opens eyes spontaneously and was more awake/restless overnight. Eyes vary from being conjugate to dysconjugate, MD aware. Not following commands or withdrawing all extremities. Febrile. Tmax 100.9. Scheduled tylenol given  -Cardiac: BPs labile. Hypertensive at beginning of shift resolved with prn hydralazine. Hypotensive intermittently overnight with MAPs in low 60s. MD aware, no new orders. HR 80-low 100s sinus rhythm. No ectopy noted  -Resp: CMV settings overnight FiO2 40%, overbreathing vent in low to mid 20s, , PEEP 5. increased amt of oral and ETT secretions. Lungs coarse, dim  -GI: TF infusing at goal rate with 100 cc water flushes q1h. Rectal pouch intact with output. NJ tube bridled at 99  -: garza draining clear yellow urine, adequate output  -Skin; knee sutures intact. Skin warm, blotchy. +3 edema throughout  -Pain: bumps of prn fentanyl given for pain   -Labs: Na 161 and osmolality 349. MD notified of both critical lab results, no new orders. K 3.6, replaced, creatinine and WBCs trending down.  -Gtts: precedex at 0.4 d/t increased agitation and work of breathing overnight. Per MD okay to increase, insulin infusing per Algorithm 3  -Access: L radial arterial line, R IJ TL CVC, PIV x2     Plan: continue nursing cares, family care conference and PET scan Thursday     Suzan Martinez  10/18/2017  5:33 AM

## 2017-10-18 NOTE — MR AVS SNAPSHOT
After Visit Summary   10/18/2017    Cricket Miguel    MRN: [id number]           Patient Information     Date Of Birth          1955        Visit Information        Provider Department      10/18/2017 7:00 AM [facility] EEG TECH 4 [facility] EEG        Today's Diagnoses     Encephalopathy    -  1       Follow-ups after your visit        Your next 10 appointments already scheduled     Oct 19, 2017  7:30 AM CDT   PET ONCOLOGY WHOLE BODY with UUPET1   [facility] [place] PET CT ([facility])    [address] [address] [phone]           Tell your doctor:   If there is any chance you may be pregnant or if you are breastfeeding.   If you have problems lying in small spaces (claustrophobia). If you do, your doctor may give you medicine to help you relax. If you have diabetes:   Have your exam early in the morning. Your blood glucose will go up as the day goes by.   Your glucose level must be 180 or less at the start of the exam. Please take any medicines you need to ensure this blood glucose level. 24 hours before your scan: Don t do any heavy exercise. (No jogging, aerobics or other workouts.) Exercise will make your pictures less accurate. 6 hours before your scan:   Stop all food and liquids (except water).   Do not chew gum or suck on mints.   If you need to take medicine with food, you may take it with a few crackers.  Please call your Imaging Department at your exam site with any questions.              Who to contact     Please call your clinic at [phone] to:    Ask questions about your health    Make or cancel appointments    Discuss your medicines    Learn about your test results    Speak to your doctor   If you have compliments or concerns about an experience at your clinic, or if you wish to file a complaint, please contact [facility] Patient Relations at [phone] or email us at  [email or web address]         Additional Information About Your Visit        MyChart Information     [employer] gives you secure access to your electronic health record. If you see a primary care provider, you can also send messages to your care team and make appointments. If you have questions, please call your primary care clinic.  If you do not have a primary care provider, please call [phone] and they will assist you.      [employer] is an electronic gateway that provides easy, online access to your medical records. With [employer], you can request a clinic appointment, read your test results, renew a prescription or communicate with your care team.     To access your existing account, please contact your [facility] or call [phone] for assistance.        Care EveryWhere ID     This is your Care EveryWhere ID. This could be used by other organizations to access your [place] medical records  [id number]         Blood Pressure from Last 3 Encounters:   10/18/17 115/83   10/06/17 125/86   02/06/17 132/84    Weight from Last 3 Encounters:   10/18/17 90 kg (198 lb 6.6 oz)   02/06/17 91.6 kg (202 lb)   01/09/17 87.5 kg (193 lb)              We Performed the Following     Glucose by meter     Glucose by meter     Glucose by meter     Glucose by meter          Today's Medication Changes      Notice     This visit is during an admission. Changes to the med list made in this visit will be reflected in the After Visit Summary of the admission.             Primary Care Provider Office Phone # Fax #    Dipak Gordillo [phone]       [address] [place] [id number]        Equal Access to Services     [facility]: Hadii jenise nova hadmelindao Sonico, waaxda luqadaha, qaybta kaalmada freddie stinson . So [facility] [phone].    ATENCIÓN: Si habla español, tiene a mendiola disposición servicios gratuitos de asistencia lingüística.  Brandin fenton [phone].    We comply with applicable federal civil rights laws and [place] laws. We do not discriminate on the basis of race, color, national origin, age, disability, sex, sexual orientation, or gender identity.            Thank you!     Thank you for choosing [facility]  for your care. Our goal is always to provide you with excellent care. Hearing back from our patients is one way we can continue to improve our services. Please take a few minutes to complete the written survey that you may receive in the mail after your visit with us. Thank you!             Your Updated Medication List - Protect others around you: Learn how to safely use, store and throw away your medicines at www.disposemymeds.org.      Notice     This visit is during an admission. Changes to the med list made in this visit will be reflected in the After Visit Summary of the admission.

## 2017-10-18 NOTE — PROGRESS NOTES
[facility] ID SERVICE: ONGOING CONSULTATION   Cricket Miguel : 1955 Sex: male:   Medical record number [id number] Attending Physician: Ramesh Kuo, *  Date of Service: 2017    1. 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 Left knee pain; concern for septic arthritis; recent steroid injection; cultures 10/6 - MSSA+, 10/8 - no growth    3. Multifocal acute infarctions involving the left parietotemporal lobes, left cerebellar hemisphere, and right occipital lobe  4. Ongoing critical illness with mechanical ventilation  5. Leukocytosis   6. Anemia  7. Acute on CKD        RECOMMENDATIONS:   1. Continue Nafcillin 2g IV Q4 hours and Levofloxacin 750mg IV Q48 hours (better CNS penetration)  2. Continue Gentamicin for synergy; pharmacy to assist in dosing and tracking levels; treat for 14 days total (through 10/20/17); if surgery is planned around this time, could extend treatment through the surgery. Consider adding Rifampin and dose per renal function   3. Daily blood cultures to demonstrate clearance  4. Monitor renal function closely given use of gentamicin    ID will continue to follow.     Sharon Rice MD, M.Med.Sc.  Pager: [phone]     SUBJECTIVE: (Recommend ? 4 systems)   Intubated /sedated    ROS:(Recommend ? 2systems)   A five-point review of systems was obtained and was negative with the exception of that which is described above.  Allergies   Allergen Reactions     Lisinopril Swelling     One-sided facial swelling after being on lisinopril/HCTZ for one week.      CURRENT ANTI-INFECTIVES:   Nafcillin   Gentamicin   Levofloxacin   Rifampin added 10/19       EXAMINATION: (Recommend ? 5 systems)   Vital Signs: BP (!) 88/51  Pulse 82  Temp 99.9  F (37.7  C)  Resp 20  Ht 1.727 m  "(5' 8\")  Wt 90 kg (198 lb 6.6 oz)  SpO2 97%  BMI 30.17 kg/m2   GENERAL:  Sedated, intubated , opens eyes   EYES:  Pale, pupils react to light sluggishly  ENT:  Intubated   NECK:  Supple. No  Cervical lymphadenopathy  LUNGS: decreased breath sounds in bases  CARDIOVASCULAR:  Regular rate and rhythm with no murmurs, gallops or rubs.  ABDOMEN:  Normal bowel sounds, soft, nontender. No appreciable hepatosplenomegaly  SKIN:  No acute rashes.    NEUROLOGIC:  Sedated  Espinoza  Rectal tube     NEW DATA/RESULTS:   Culture Micro   Date Value Ref Range Status   10/18/2017 PENDING  Preliminary   10/17/2017 No growth after 21 hours  Preliminary   10/17/2017 No growth after 21 hours  Preliminary   10/16/2017 No growth after 2 days  Preliminary   10/16/2017 No growth after 2 days  Preliminary       Recent Labs   Lab Test  10/16/17   0323  10/09/17   0316  10/07/17   1333  10/06/17   1859  05/06/13   1703  02/02/11   1506   CRP  48.0*  240.0*  290.0*  310.0*  <5.0  9.6*     Recent Labs   Lab Test  10/18/17   0401  10/17/17   0441  10/16/17   0323  10/15/17   0209  10/14/17   2116  10/14/17   0404   WBC  14.7*  15.8*  18.3*  24.4*  21.2*  19.7*     Recent Labs   Lab Test  10/18/17   1055  10/18/17   0742  10/18/17   0401  10/18/17   0047   CR  2.39*  2.47*  2.33*  2.46*   GFRESTIMATED  27*  26*  28*  27*     Hematology Studies  Recent Labs   Lab Test  10/18/17   0401  10/17/17   0441  10/16/17   0323  10/16/17   0007  10/15/17   0209  10/14/17   2116  10/14/17   0404   10/06/17   1020   05/06/13   1703  12/04/12   1016  02/02/11   1506   11/06/09   1155  11/02/09   1717   WBC  14.7*  15.8*  18.3*   --   24.4*  21.2*  19.7*   < >  17.1*   < >  10.6  11.9*  11.9*   < >  9.2  12.1*   ANEU   --    --    --    --    --    --    --    --   15.7*   --   7.2  7.9  8.2   --   5.9  8.3   AEOS   --    --    --    --    --    --    --    --   0.0   --   0.0  0.4  0.3   --   0.3  0.3   HCT  26.2*  30.1*  32.5*   --   35.8*  34.4*  34.9*   < >  " 43.8   < >  46.2  47.4  46.8   < >  47.1  45.9   PLT  297  287  309  298  316  293  304   < >  185   < >  315  259  315   < >  303  312    < > = values in this interval not displayed.     Metabolic  Recent Labs   Lab Test  10/18/17   1055  10/18/17   0742  10/18/17   0401   NA  160*  158*  161*   BUN  71*  73*  74*   CO2  22  22  23   CR  2.39*  2.47*  2.33*   GFRESTIMATED  27*  26*  28*     Hepatic Studies  Recent Labs   Lab Test  10/06/17   1020  06/03/16   1716  04/26/16   0654   BILITOTAL  1.5*  0.3  0.6   ALKPHOS  85  104  91   ALBUMIN  2.8*  3.4  3.1*   AST  24  19  20   ALT  33  32  26     Immunologlobulins  Recent Labs   Lab Test  05/06/13   1703  02/02/11   1506   SED  6  8      PET Oncology Whole body 10/19/17    IMPRESSION:   1. Multifocal hypermetabolic areas in the ascending aortic graft with  associated periaortic fluid collection which has a hypermetabolic rim.  These findings are concerning for aortic graft infection.  2. 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 This may  represent spread of infection from the aortic root to the pulmonary  artery.   2a. Right ventricular wall FDG uptake which is abnormal.  The  differential includes right heart strain, pulmonary hypertension,  myocarditis. Concerning for infection given its contiguous with the  pulmonary artery uptake.   3. Small FDG avid left knee joint effusion, likely infectious.

## 2017-10-18 NOTE — PROGRESS NOTES
EEG CLINICAL NEUROPHYSIOLOGY PRELIMINARY REPORT    .Video-EEG through 0600 today reviewed. Continues with evidence of moderate diffuse encephalopathy. Continues with evidence of focal cortical dysfunction over left hemisphere. No epileptiform discharges or electrographic seizures.    Stefan Pham MD  Pager [phone]

## 2017-10-18 NOTE — PROGRESS NOTES
CV ICU   Progress Note:    S/Interval History:  Overnight no events.      O:  Temp: 99.9  F (37.7  C) Temp  Min: 99.5  F (37.5  C)  Max: 100.9  F (38.3  C)  Resp: 20 Resp  Min: 13  Max: 27  SpO2: 97 % SpO2  Min: 95 %  Max: 100 %    No Data Recorded  Heart Rate: 81 Heart Rate  Min: 75  Max: 105  BP: (!) 88/51   Systolic (24hrs), Av , Min:88 , Max:133   Diastolic (24hrs), Av, Min:51, Max:90    Ventilation Mode: CPAP/PS  FiO2 (%): 40 %  Rate Set (breaths/minute): 12 breaths/min  Tidal Volume Set (mL): 500 mL  PEEP (cm H2O): 5 cmH2O  Pressure Support (cm H2O): 10 cmH2O  Oxygen Concentration (%): 40 %  Resp: 20     Date 10/18/17 0700 - 10/19/17 0659   Shift 7668-9342 7913-9337 9778-9756 24 Hour Total   I  N  T  A  K  E   I.V. 140.6   140.6    NG/   780    Enteral 390   390    Shift Total  (mL/kg) 1310.6  (14.56)   1310.6  (14.56)   O  U  T  P  U  T   Urine 700   700    Shift Total  (mL/kg) 700  (7.78)   700  (7.78)   Weight (kg) 90 90 90 90       Physical Exam    General: Intubated, mechanically ventilated, non responsive   Neck: Supple, no tracheal deviation  Cardiovascular: RRR  Pumonary: Non labored breathing on MV. Mild crackles bilaterally   Abdomen: Soft, non distended, non tender.   Ext: No peripheral edema, appropriate distal perfusion   Neuro: Not moving right upper and lower extremity     Lab Results   Component Value Date    WBC 14.7 (H) 10/18/2017    HGB 8.5 (L) 10/18/2017    HCT 26.2 (L) 10/18/2017     10/18/2017     (H) 10/18/2017    POTASSIUM 3.8 10/18/2017    CHLORIDE 128 (H) 10/18/2017    CO2 22 10/18/2017    BUN 71 (H) 10/18/2017    CR 2.39 (H) 10/18/2017     (H) 10/18/2017    SED 6 2013    DD 1.7 (H) 2016    NTBNPI 2768 (H) 2016    NTBNP 1302 (H) 2016    TROPONIN <0.07 2005    TROPI 0.634 (HH) 10/08/2017    AST 24 10/06/2017    ALT 33 10/06/2017    ALKPHOS 85 10/06/2017    BILITOTAL 1.5 (H) 10/06/2017    INR 1.03 10/08/2017          Recent Labs  Lab 10/18/17  0401 10/16/17  0529 10/15/17  0001 10/14/17  2116   PH 7.47* 7.47* 7.49* 7.48*   PCO2 32* 32* 33* 32*   PO2 100 93 98 75*   HCO3 23 24 25 24   O2PER 40.0 60 60 50.0         A/P:  Cricket Miguel is a 62 year old with history of AVR and ascending aortic repair in May 2016 presented on 10/6 with acute mental status changes found to have large moises-aortic fluid collection and multiple strokes likely septic emboli.    Neuro: Tylenol scheduled for fevers,continue neuro checks Q2H,  precedex and wake up as able. Okay to use ibuprofen for fevers if needed per neuro. No MRI needed per Neurocritical.    Resp: Inability to protect airway requiring mechanical ventilation, on minimal vent settings, pressure support all day as tolerated. Could wean to extubate if mental status improves.   CV: Goal normotension per neurology/neurosurgery. ASA.  GI/FEN: Tube feeds to goal, increase free water to 100 mL Q1 H for hypernatremia (goal 145-155, go slow per neuro), will recheck BMPs Q6. +Bowel function. Change NS in continuous infusions and other IV meds to D5   Renal: JOSE non-oliguric, clinically euvolemic   Endo: Insulin gtt for glucose control  ID: Continue current antibiotic regimen pending ID recommendations (Nafcillin, levofloxacin, gentamycin), daily blood cultures negative since 10/13. WBC slowly decreasing. PET scan tomorrow.   Heme: No bleeding concerns at this time, hold anticoagulation per neuro and neurosurgery  Prophylaxis: H2 blocker, no chemical dvt ppx due to evolving stroke  Dispo: Family meeting tomorrow at 2 PM. CVICU     Patient seen with Dr. Matute, staff Intensivist       Sukhwinder Mejía MD  Anesthesiology Resident   [phone]

## 2017-10-18 NOTE — PROGRESS NOTES
Neuroscience Intensive Care Progress Note    10/18/2017  Mr. Cricket Miguel is a 62 year old gentleman w/ h/o AVR and ascending aortic repair in 2016, 1st degree AVB present, observing with serial EKGs. admitted with acute mental status changes found to have large moises-aortic fluid collection and multiple strokes likely septic emboli. The strokes involved the L cerebellar, L MCA, and R occipital regions. Small microhemorrhages were observed.  Concern for moises-infarct edema leading to expansion of the cerebellar infarct. Cerebral angiogram showed no evidence of mycotic aneurysm. NeuroICU and Neurosurgery following.  Hemodynamically stable off pressors.  Blood cultures growing MSSA now clearing intermittently.  Likely source of L knee septic arthritis which underwent arthroscopic irrigation and debridement 10/8.  On nafcillin/levaquin/gent - appreciate ID assistance.  JOSE present but improving.  Current plan for non-contrast CT abdomen early this week to evaluate prior signs of R renal pyelonephritis. Tagged WBC scan later this week.  Possible surgery as early as the week of Oct 23rd.  Attempting to lighten sedation and attempt extubation this week though he has been slow to awaken from sedation likely due to his inflammatory state in the setting of multiple strokes.  He is also increasingly febrile over the last two days.  His antipyretic regimen was intensified today.  Plan for family meeting this week after the tagged WBC scan to discuss treatment options.    24 hour events:  Pt is off sedation, open his eyes spontaneously, Small sluggishly reactive pupil bilaterally approximately 2mm - Left oculocephalic & LT Corneal abscent with mild intermittent nystagmus to the RT side , ON PRESURE SUPPORT, Has EEG shows moderate encephalopathy with no seizures.    24 Hour Vital Signs Summary:  Temperatures:  Current - Temp: 101.1  F (38.4  C); Max - Temp  Av.8  F (38.2  C)  Min: 99.9  F (37.7  C)  Max: 101.5  F (38.6   C)  Respiration range: Resp  Av.4  Min: 16  Max: 20  Pulse range: No Data Recorded  Blood pressure range: Systolic (24hrs), Av , Min:94 , Max:175   ; Diastolic (24hrs), Av, Min:57, Max:98    Pulse oximetry range: SpO2  Av.2 %  Min: 93 %  Max: 97 %    Ventilator Settings  Ventilation Mode: CPAP/PS  FiO2 (%): 40 %  Rate Set (breaths/minute): 12 breaths/min  Tidal Volume Set (mL): 500 mL  PEEP (cm H2O): 5 cmH2O  Pressure Support (cm H2O): 10 cmH2O  Oxygen Concentration (%): 40 %  Resp: 20      Intake/Output Summary (Last 24 hours) at 10/13/17 1808  Last data filed at 10/13/17 1700   Gross per 24 hour   Intake          3939.68 ml   Output             3025 ml   Net           914.68 ml       Arterial Line BP: ()/(49-76) 112/63  MAP:  [59 mmHg-99 mmHg] 77 mmHg  BP - Mean:  [] 62    Current Medications:    lactated ringers         acetaminophen  1,000 mg Oral or Feeding Tube Q6H     gentamicin  80 mg Intravenous Q24H     nystatin  500,000 Units Swish & Spit Q6H     aspirin  81 mg Oral or Feeding Tube Daily     nafcillin  2 g Intravenous Q4H     levofloxacin  750 mg Intravenous Q48H     famotidine  20 mg Intravenous Q24H     influenza quadrivalent (PF) vacc age 3 yrs and older  0.5 mL Intramuscular Prior to discharge     pneumococcal vaccine  0.5 mL Intramuscular Prior to discharge     sodium chloride (PF)  3 mL Intravenous Q8H     multivitamins with minerals  15 mL Per Feeding Tube Daily     sodium chloride (PF)  3 mL Intracatheter Q8H       PRN Medications:  polyethylene glycol, senna-docusate, HOLD MEDICATION, lidocaine BUFFERED 1 %, ondansetron **OR** ondansetron, hydrALAZINE, sodium chloride (PF), - MEDICATION INSTRUCTIONS -, prochlorperazine **OR** prochlorperazine, naloxone, IV fluid REPLACEMENT ONLY, glucose **OR** dextrose **OR** glucagon, lidocaine (buffered or not buffered), lidocaine 4%, Reason beta blocker order not selected, insulin aspart, albuterol, albuterol, fentaNYL,  "potassium chloride, potassium chloride, potassium chloride, potassium chloride with lidocaine, potassium chloride, magnesium sulfate, magnesium sulfate, potassium phosphate (KPHOS) in D5W IV, potassium phosphate (KPHOS) in D5W IV, potassium phosphate (KPHOS) in D5W IV, potassium phosphate (KPHOS) in D5W IV, potassium phosphate (KPHOS) in D5W IV, diphenhydrAMINE **OR** diphenhydrAMINE    Infusions:    dexmedetomidine 0.1 mcg/kg/hr (10/18/17 1000)     - MEDICATION INSTRUCTIONS -       IV fluid REPLACEMENT ONLY       insulin (regular) 2 Units/hr (10/18/17 0800)     Reason beta blocker order not selected         Allergies   Allergen Reactions     Lisinopril Swelling     One-sided facial swelling after being on lisinopril/HCTZ for one week.        Physical Examination:  BP (!) 88/51  Pulse 82  Temp 99.9  F (37.7  C)  Resp 20  Ht 1.727 m (5' 8\")  Wt 90 kg (198 lb 6.6 oz)  SpO2 97%  BMI 30.17 kg/m2  Intubated, on sedation. 2mm sluggish pupil - no corneal response on left - brisk response on right. Left oculocephalic absent. Not withdrawing with painful stimuli. Grimace to noxious stimuli, LT UL withdraw intermittentily.      Labs/Studies:  Recent Labs   Lab Test  10/13/17   1553  10/13/17   1406  10/13/17   1204   10/13/17   0357   10/12/17   0446   10/11/17   0312   NA  165*  167*  166*   < >  169*  169*   < >  167*  167*   < >  162*   POTASSIUM  4.1  4.2  4.5   < >  4.5   < >  4.2   --   3.8   CHLORIDE  133*  135*  134*   < >  136*   < >  134*  >125*   --   130*   CO2  27  27  26   < >  26   < >  24   --   24   ANIONGAP  5  6  6   < >  7   < >  9   --   8   GLC  155*  188*  176*   < >  183*   < >  209*   --   201*   BUN  79*  81*  82*   < >  81*   < >  74*   --   60*   CR  3.20*  3.22*  3.16*   < >  3.31*   < >  3.31*   --   3.35*   IZABELLA  7.9*  7.8*  7.7*   < >  7.8*   < >  7.8*   --   8.1*   WBC   --    --    --    --   15.7*   --   11.5*   --   13.4*   RBC   --    --    --    --   3.62*   --   4.08*   --   " 4.45   HGB   --    --    --    --   10.7*   --   12.0*   --   13.4   PLT   --    --    --    --   322   --   266   --   238    < > = values in this interval not displayed.       Recent Labs   Lab Test  10/08/17   0328  10/06/17   1322  10/06/17   1020  05/18/16   0435   INR  1.03  1.25*  1.23*  1.28*   PTT   --   32  32  35           Recent Labs  Lab 10/18/17  0401 10/16/17  0529 10/15/17  0001 10/14/17  2116   PH 7.47* 7.47* 7.49* 7.48*   PCO2 32* 32* 33* 32*   PO2 100 93 98 75*   HCO3 23 24 25 24   O2PER 40.0 60 60 50.0           Imaging:  Reviewed     Assessment/Plan  Mr. Cricket Miguel is a 62 year old gentleman w/ h/o AVR and ascending aortic repair in 5/2016, 1st degree AVB present, observing with serial EKGs. admitted with acute mental status changes found to have large moises-aortic fluid collection and multiple strokes likely septic emboli. The strokes involved the L cerebellar, L MCA, and R occipital regions. Small microhemorrhages were observed.  Concern for moises-infarct edema leading to expansion of the cerebellar infarct. Cerebral angiogram showed no evidence of mycotic aneurysm. NeuroICU and Neurosurgery following.  Hemodynamically stable off pressors.  Blood cultures growing MSSA now clearing intermittently.  Likely source of L knee septic arthritis which underwent arthroscopic irrigation and debridement 10/8.  On nafcillin/levaquin/gent - appreciate ID assistance.  JOSE present but improving.  Current plan for non-contrast CT abdomen early this week to evaluate prior signs of R renal pyelonephritis. Tagged WBC scan later this week.  Possible surgery as early as the week of Oct 23rd.  Attempting to lighten sedation and attempt extubation this week though he has been slow to awaken from sedation likely due to his inflammatory state in the setting of multiple strokes.  He is also increasingly febrile over the last two days.  His antipyretic regimen was intensified today.  Plan for family meeting this week  after the tagged WBC scan to discuss treatment options.    - Exam changes noted above  - ASA 81 mg   - off sedation to assess neurologic exam  - hypertonic saline off, Na 160 - can allow to slowly drift down - given exam change, avoid sudden drop in Na. Target -160 aiming towards 155  - Cerebral Angiogram normal  -  VEEG  -  Waiting for MRI Brain results.  -  The patient from the neurology stand of point has good prognosis & he can have a chance of extubation   - Family discussion on Thursday   - will continue to follow      Plan discussed with Dr. Brambila.      Lukasz Baker  Neurocritical care Fellow  Pager [phone]

## 2017-10-18 NOTE — PROGRESS NOTES
EEG CLINICAL NEUROPHYSIOLOGY PRELIMINARY REPORT    First several hours of video-EEG reviewed. 9 Hz posterior dominant rhythm significantly attenuated on left. Sleep spindles also attenuated on left. Moderate amounts of diffuse delta. No epileptiform activity, PLEDs, or seizures.    Study consistent with moderate diffuse encephalopathy. There is evidence of additional left hemispheric cortical dysfunction, consistent with known MCA CVA in this region. No epileptiform discharges or electrographic seizures. Full report to follow.    Stefan Pham MD  Pager [phone]

## 2017-10-18 NOTE — PROGRESS NOTES
CVICU PROGRESS NOTE  10/18/2017  Attending: Ramesh Kuo, *    S:   No acute issues overnight. Continues to be febrile but fever curve improving. PST most of the day yesterday.     O:   Vitals:    10/18/17 0500 10/18/17 0600 10/18/17 0700 10/18/17 0800   BP:  93/55  94/62   BP Location:    Right arm   Pulse:       Resp: 27 23 21 19   Temp: 100.9  F (38.3  C) 100.6  F (38.1  C) 100  F (37.8  C) 99.7  F (37.6  C)   TempSrc:    Bladder   SpO2: 100% 100% 98% 97%   Weight:       Height:         Vitals:    10/15/17 0400 10/16/17 0000 10/18/17 0300   Weight: 89.5 kg (197 lb 5 oz) 90.5 kg (199 lb 8.3 oz) 90 kg (198 lb 6.6 oz)       Intake/Output Summary (Last 24 hours) at 10/18/17 1102  Last data filed at 10/18/17 0800   Gross per 24 hour   Intake          4724.94 ml   Output             3315 ml   Net          1409.94 ml       Ventilation Mode: CPAP/PS  FiO2 (%): 40 %  Rate Set (breaths/minute): 12 breaths/min  Tidal Volume Set (mL): 500 mL  PEEP (cm H2O): 5 cmH2O  Pressure Support (cm H2O): 10 cmH2O  Oxygen Concentration (%): 40 %  Resp: 19    Recent Labs  Lab 10/18/17  0401 10/16/17  0529 10/15/17  0001 10/14/17  2116   PH 7.47* 7.47* 7.49* 7.48*   PCO2 32* 32* 33* 32*   PO2 100 93 98 75*   HCO3 23 24 25 24   O2PER 40.0 60 60 50.0       MAPs:   General: Intubated, lightly sedated, does not follow commands. Intermittently opens eyes spontaneously.   Neck: Supple, no tracheal deviation  Cardiovascular: RRR  Pumonary: Non labored breathing on MV  Abdomen: Soft, non distended, non tender  Ext: Minimal edema, warm  Neuro: Does not follow commands      Labs:  [unfilled]  GLUCOSE:     Recent Labs  Lab 10/18/17  1053 10/18/17  0742 10/18/17  0604 10/18/17  0410 10/18/17  0401 10/18/17  0224 10/18/17  0047 10/17/17  2355  10/17/17  2003  10/17/17  1605  10/17/17  1150   GLC  --  139*  --   --  137*  --  153*  --   --  123*  --  119*  --  134*   * 132* 127* 121*  --  134*  --  127*  < >  --   < >  --   < >  --    < >  = values in this interval not displayed.      Imaging:  no recent imaging      A/P:   Cricket Miguel is a 62 year old with history of AVR and ascending aortic repair in May 2016 presented on 10/6 with acute mental status changes found to have large moises-aortic fluid collection and multiple strokes likely septic emboli.    Neuro: Tylenol scheduled for fevers,continue neuro checks Q2H,  precedex and wake up as able. Okay to use ibuprofen for fevers if needed per neuro. Will obtain MRI  Resp: Inability to protect airway requiring mechanical ventilation, on minimal vent settings, pressure support trials, if does well then will continue on pressure support  CV: Goal normotension per neurology/neurosurgery. ASA.  GI/FEN: Tube feeds to goal, continue free water for hypernatremia (goal 145-155, go slow per neuro), will recheck BMPs Q6. +Bowel function  Renal: JOSE non-oliguric, clinically euvolemic   Endo: Insulin gtt for glucose control  ID: Continue current antibiotic regimen pending ID recommendations (Nafcillin, levofloxacin, gentamycin), daily blood cultures negative since 10/13. WBC slowly decreasing. PET scan tomorrow.   Heme: No bleeding concerns at this time, hold anticoagulation per neuro and neurosurgery  Prophylaxis: H2 blocker, no chemical dvt ppx due to evolving stroke  Dispo: CVICU      Rula Contreras  General Surgery PGY 4  Cardiothoracic Surgery  [id number]

## 2017-10-19 ENCOUNTER — APPOINTMENT (OUTPATIENT)
Dept: PET IMAGING | Facility: CLINIC | Age: 62
DRG: 004 | End: 2017-10-19
Attending: PHYSICIAN ASSISTANT
Payer: COMMERCIAL

## 2017-10-19 ENCOUNTER — ALLIED HEALTH/NURSE VISIT (OUTPATIENT)
Dept: NEUROLOGY | Facility: CLINIC | Age: 62
DRG: 004 | End: 2017-10-19
Attending: PSYCHIATRY & NEUROLOGY
Payer: COMMERCIAL

## 2017-10-19 DIAGNOSIS — G93.40 ENCEPHALOPATHY: Primary | ICD-10-CM

## 2017-10-19 LAB
ANION GAP SERPL CALCULATED.3IONS-SCNC: 10 MMOL/L (ref 3–14)
ANION GAP SERPL CALCULATED.3IONS-SCNC: 6 MMOL/L (ref 3–14)
ANION GAP SERPL CALCULATED.3IONS-SCNC: 6 MMOL/L (ref 3–14)
ANION GAP SERPL CALCULATED.3IONS-SCNC: 8 MMOL/L (ref 3–14)
ANION GAP SERPL CALCULATED.3IONS-SCNC: 9 MMOL/L (ref 3–14)
BACTERIA SPEC CULT: NO GROWTH
BASE DEFICIT BLDA-SCNC: 1 MMOL/L
BUN SERPL-MCNC: 59 MG/DL (ref 7–30)
BUN SERPL-MCNC: 61 MG/DL (ref 7–30)
BUN SERPL-MCNC: 63 MG/DL (ref 7–30)
BUN SERPL-MCNC: 67 MG/DL (ref 7–30)
BUN SERPL-MCNC: 67 MG/DL (ref 7–30)
CALCIUM SERPL-MCNC: 7.2 MG/DL (ref 8.5–10.1)
CALCIUM SERPL-MCNC: 7.4 MG/DL (ref 8.5–10.1)
CALCIUM SERPL-MCNC: 7.4 MG/DL (ref 8.5–10.1)
CALCIUM SERPL-MCNC: 7.5 MG/DL (ref 8.5–10.1)
CALCIUM SERPL-MCNC: 7.6 MG/DL (ref 8.5–10.1)
CHLORIDE SERPL-SCNC: 126 MMOL/L (ref 94–109)
CHLORIDE SERPL-SCNC: 126 MMOL/L (ref 94–109)
CHLORIDE SERPL-SCNC: 127 MMOL/L (ref 94–109)
CHLORIDE SERPL-SCNC: 130 MMOL/L (ref 94–109)
CHLORIDE SERPL-SCNC: 130 MMOL/L (ref 94–109)
CO2 SERPL-SCNC: 21 MMOL/L (ref 20–32)
CO2 SERPL-SCNC: 22 MMOL/L (ref 20–32)
CO2 SERPL-SCNC: 22 MMOL/L (ref 20–32)
CO2 SERPL-SCNC: 23 MMOL/L (ref 20–32)
CO2 SERPL-SCNC: 24 MMOL/L (ref 20–32)
CREAT SERPL-MCNC: 2.09 MG/DL (ref 0.66–1.25)
CREAT SERPL-MCNC: 2.16 MG/DL (ref 0.66–1.25)
CREAT SERPL-MCNC: 2.2 MG/DL (ref 0.66–1.25)
CREAT SERPL-MCNC: 2.21 MG/DL (ref 0.66–1.25)
CREAT SERPL-MCNC: 2.23 MG/DL (ref 0.66–1.25)
ERYTHROCYTE [DISTWIDTH] IN BLOOD BY AUTOMATED COUNT: 17.2 % (ref 10–15)
GFR SERPL CREATININE-BSD FRML MDRD: 30 ML/MIN/1.7M2
GFR SERPL CREATININE-BSD FRML MDRD: 31 ML/MIN/1.7M2
GFR SERPL CREATININE-BSD FRML MDRD: 32 ML/MIN/1.7M2
GLUCOSE BLDC GLUCOMTR-MCNC: 116 MG/DL (ref 70–99)
GLUCOSE BLDC GLUCOMTR-MCNC: 120 MG/DL (ref 70–99)
GLUCOSE BLDC GLUCOMTR-MCNC: 120 MG/DL (ref 70–99)
GLUCOSE BLDC GLUCOMTR-MCNC: 123 MG/DL (ref 70–99)
GLUCOSE BLDC GLUCOMTR-MCNC: 123 MG/DL (ref 70–99)
GLUCOSE BLDC GLUCOMTR-MCNC: 125 MG/DL (ref 70–99)
GLUCOSE BLDC GLUCOMTR-MCNC: 134 MG/DL (ref 70–99)
GLUCOSE BLDC GLUCOMTR-MCNC: 134 MG/DL (ref 70–99)
GLUCOSE BLDC GLUCOMTR-MCNC: 145 MG/DL (ref 70–99)
GLUCOSE BLDC GLUCOMTR-MCNC: 161 MG/DL (ref 70–99)
GLUCOSE SERPL-MCNC: 128 MG/DL (ref 70–99)
GLUCOSE SERPL-MCNC: 130 MG/DL (ref 70–99)
GLUCOSE SERPL-MCNC: 137 MG/DL (ref 70–99)
GLUCOSE SERPL-MCNC: 142 MG/DL (ref 70–99)
GLUCOSE SERPL-MCNC: 158 MG/DL (ref 70–99)
HCO3 BLD-SCNC: 23 MMOL/L (ref 21–28)
HCT VFR BLD AUTO: 27.8 % (ref 40–53)
HGB BLD-MCNC: 8.9 G/DL (ref 13.3–17.7)
MAGNESIUM SERPL-MCNC: 2.6 MG/DL (ref 1.6–2.3)
MAGNESIUM SERPL-MCNC: 2.7 MG/DL (ref 1.6–2.3)
MCH RBC QN AUTO: 30 PG (ref 26.5–33)
MCHC RBC AUTO-ENTMCNC: 32 G/DL (ref 31.5–36.5)
MCV RBC AUTO: 94 FL (ref 78–100)
O2/TOTAL GAS SETTING VFR VENT: 40 %
OSMOLALITY SERPL: 342 MMOL/KG (ref 280–301)
OSMOLALITY SERPL: 348 MMOL/KG (ref 280–301)
OSMOLALITY SERPL: 350 MMOL/KG (ref 280–301)
PCO2 BLD: 30 MM HG (ref 35–45)
PH BLD: 7.49 PH (ref 7.35–7.45)
PHOSPHATE SERPL-MCNC: 2.9 MG/DL (ref 2.5–4.5)
PHOSPHATE SERPL-MCNC: 3.2 MG/DL (ref 2.5–4.5)
PLATELET # BLD AUTO: 330 10E9/L (ref 150–450)
PLATELET # BLD AUTO: 351 10E9/L (ref 150–450)
PO2 BLD: 144 MM HG (ref 80–105)
POTASSIUM SERPL-SCNC: 3.6 MMOL/L (ref 3.4–5.3)
POTASSIUM SERPL-SCNC: 3.6 MMOL/L (ref 3.4–5.3)
POTASSIUM SERPL-SCNC: 3.7 MMOL/L (ref 3.4–5.3)
POTASSIUM SERPL-SCNC: 3.8 MMOL/L (ref 3.4–5.3)
POTASSIUM SERPL-SCNC: 4.2 MMOL/L (ref 3.4–5.3)
RBC # BLD AUTO: 2.97 10E12/L (ref 4.4–5.9)
SODIUM SERPL-SCNC: 156 MMOL/L (ref 133–144)
SODIUM SERPL-SCNC: 156 MMOL/L (ref 133–144)
SODIUM SERPL-SCNC: 157 MMOL/L (ref 133–144)
SODIUM SERPL-SCNC: 159 MMOL/L (ref 133–144)
SODIUM SERPL-SCNC: 161 MMOL/L (ref 133–144)
SPECIMEN SOURCE: NORMAL
WBC # BLD AUTO: 18.6 10E9/L (ref 4–11)

## 2017-10-19 PROCEDURE — 83735 ASSAY OF MAGNESIUM: CPT | Performed by: STUDENT IN AN ORGANIZED HEALTH CARE EDUCATION/TRAINING PROGRAM

## 2017-10-19 PROCEDURE — 85049 AUTOMATED PLATELET COUNT: CPT | Performed by: ORTHOPAEDIC SURGERY

## 2017-10-19 PROCEDURE — 25000132 ZZH RX MED GY IP 250 OP 250 PS 637: Performed by: PHYSICIAN ASSISTANT

## 2017-10-19 PROCEDURE — 82803 BLOOD GASES ANY COMBINATION: CPT | Performed by: ORTHOPAEDIC SURGERY

## 2017-10-19 PROCEDURE — 80048 BASIC METABOLIC PNL TOTAL CA: CPT | Performed by: STUDENT IN AN ORGANIZED HEALTH CARE EDUCATION/TRAINING PROGRAM

## 2017-10-19 PROCEDURE — 83930 ASSAY OF BLOOD OSMOLALITY: CPT | Performed by: STUDENT IN AN ORGANIZED HEALTH CARE EDUCATION/TRAINING PROGRAM

## 2017-10-19 PROCEDURE — 87040 BLOOD CULTURE FOR BACTERIA: CPT | Performed by: STUDENT IN AN ORGANIZED HEALTH CARE EDUCATION/TRAINING PROGRAM

## 2017-10-19 PROCEDURE — S0032 INJECTION, NAFCILLIN SODIUM: HCPCS | Performed by: THORACIC SURGERY (CARDIOTHORACIC VASCULAR SURGERY)

## 2017-10-19 PROCEDURE — 25000125 ZZHC RX 250: Performed by: STUDENT IN AN ORGANIZED HEALTH CARE EDUCATION/TRAINING PROGRAM

## 2017-10-19 PROCEDURE — 85027 COMPLETE CBC AUTOMATED: CPT | Performed by: STUDENT IN AN ORGANIZED HEALTH CARE EDUCATION/TRAINING PROGRAM

## 2017-10-19 PROCEDURE — 25000128 H RX IP 250 OP 636: Performed by: THORACIC SURGERY (CARDIOTHORACIC VASCULAR SURGERY)

## 2017-10-19 PROCEDURE — 40000275 ZZH STATISTIC RCP TIME EA 10 MIN

## 2017-10-19 PROCEDURE — 25000125 ZZHC RX 250: Performed by: THORACIC SURGERY (CARDIOTHORACIC VASCULAR SURGERY)

## 2017-10-19 PROCEDURE — 95951 ZZHC EEG VIDEO EACH 24 HR: CPT | Mod: ZF

## 2017-10-19 PROCEDURE — 84100 ASSAY OF PHOSPHORUS: CPT | Performed by: STUDENT IN AN ORGANIZED HEALTH CARE EDUCATION/TRAINING PROGRAM

## 2017-10-19 PROCEDURE — 94681 O2 UPTK CO2 OUTP % O2 XTRC: CPT

## 2017-10-19 PROCEDURE — 25000132 ZZH RX MED GY IP 250 OP 250 PS 637: Performed by: STUDENT IN AN ORGANIZED HEALTH CARE EDUCATION/TRAINING PROGRAM

## 2017-10-19 PROCEDURE — 36415 COLL VENOUS BLD VENIPUNCTURE: CPT | Performed by: STUDENT IN AN ORGANIZED HEALTH CARE EDUCATION/TRAINING PROGRAM

## 2017-10-19 PROCEDURE — 25000132 ZZH RX MED GY IP 250 OP 250 PS 637: Performed by: THORACIC SURGERY (CARDIOTHORACIC VASCULAR SURGERY)

## 2017-10-19 PROCEDURE — 78816 PET IMAGE W/CT FULL BODY: CPT | Mod: PI

## 2017-10-19 PROCEDURE — 00000146 ZZHCL STATISTIC GLUCOSE BY METER IP

## 2017-10-19 PROCEDURE — 25000125 ZZHC RX 250: Performed by: PHYSICIAN ASSISTANT

## 2017-10-19 PROCEDURE — 99291 CRITICAL CARE FIRST HOUR: CPT | Mod: GC | Performed by: ANESTHESIOLOGY

## 2017-10-19 PROCEDURE — 34300033 ZZH RX 343: Performed by: THORACIC SURGERY (CARDIOTHORACIC VASCULAR SURGERY)

## 2017-10-19 PROCEDURE — 20000004 ZZH R&B ICU UMMC

## 2017-10-19 PROCEDURE — S0028 INJECTION, FAMOTIDINE, 20 MG: HCPCS | Performed by: THORACIC SURGERY (CARDIOTHORACIC VASCULAR SURGERY)

## 2017-10-19 PROCEDURE — A9552 F18 FDG: HCPCS | Performed by: THORACIC SURGERY (CARDIOTHORACIC VASCULAR SURGERY)

## 2017-10-19 PROCEDURE — 80048 BASIC METABOLIC PNL TOTAL CA: CPT | Performed by: ORTHOPAEDIC SURGERY

## 2017-10-19 PROCEDURE — 25000128 H RX IP 250 OP 636: Performed by: STUDENT IN AN ORGANIZED HEALTH CARE EDUCATION/TRAINING PROGRAM

## 2017-10-19 PROCEDURE — 94003 VENT MGMT INPAT SUBQ DAY: CPT

## 2017-10-19 PROCEDURE — 27210437 ZZH NUTRITION PRODUCT SEMIELEM INTERMED LITER

## 2017-10-19 PROCEDURE — 25000132 ZZH RX MED GY IP 250 OP 250 PS 637: Performed by: SURGERY

## 2017-10-19 PROCEDURE — 40000281 ZZH STATISTIC TRANSPORT TIME EA 15 MIN

## 2017-10-19 PROCEDURE — S0032 INJECTION, NAFCILLIN SODIUM: HCPCS | Performed by: STUDENT IN AN ORGANIZED HEALTH CARE EDUCATION/TRAINING PROGRAM

## 2017-10-19 RX ORDER — NAFCILLIN 2 G/100ML
2 INJECTION, SOLUTION INTRAVENOUS EVERY 4 HOURS
Status: DISCONTINUED | OUTPATIENT
Start: 2017-10-19 | End: 2017-10-19

## 2017-10-19 RX ORDER — RIFAMPIN 300 MG/1
300 CAPSULE ORAL ONCE
Status: COMPLETED | OUTPATIENT
Start: 2017-10-19 | End: 2017-10-19

## 2017-10-19 RX ORDER — NAFCILLIN SODIUM 2 G/8ML
2 INJECTION, POWDER, FOR SOLUTION INTRAMUSCULAR; INTRAVENOUS EVERY 4 HOURS
Status: DISCONTINUED | OUTPATIENT
Start: 2017-10-19 | End: 2017-10-19

## 2017-10-19 RX ADMIN — NAFCILLIN SODIUM: 2 INJECTION, POWDER, LYOPHILIZED, FOR SOLUTION INTRAMUSCULAR; INTRAVENOUS at 14:13

## 2017-10-19 RX ADMIN — FAMOTIDINE 20 MG: 10 INJECTION, SOLUTION INTRAVENOUS at 10:03

## 2017-10-19 RX ADMIN — METOPROLOL TARTRATE 12.5 MG: 100 TABLET, FILM COATED ORAL at 12:14

## 2017-10-19 RX ADMIN — NAFCILLIN SODIUM 2 G: 2 INJECTION, POWDER, LYOPHILIZED, FOR SOLUTION INTRAMUSCULAR; INTRAVENOUS at 10:00

## 2017-10-19 RX ADMIN — POTASSIUM CHLORIDE 20 MEQ: 1.5 POWDER, FOR SOLUTION ORAL at 05:03

## 2017-10-19 RX ADMIN — NYSTATIN 500000 UNITS: 100000 SUSPENSION ORAL at 05:03

## 2017-10-19 RX ADMIN — NYSTATIN 500000 UNITS: 100000 SUSPENSION ORAL at 00:21

## 2017-10-19 RX ADMIN — FENTANYL CITRATE 50 MCG: 50 INJECTION, SOLUTION INTRAMUSCULAR; INTRAVENOUS at 00:34

## 2017-10-19 RX ADMIN — MULTIVITAMIN 15 ML: LIQUID ORAL at 10:03

## 2017-10-19 RX ADMIN — ACETAMINOPHEN 1000 MG: 160 SOLUTION ORAL at 22:27

## 2017-10-19 RX ADMIN — ACETAMINOPHEN 1000 MG: 160 SOLUTION ORAL at 16:00

## 2017-10-19 RX ADMIN — POTASSIUM CHLORIDE 20 MEQ: 1.5 POWDER, FOR SOLUTION ORAL at 12:35

## 2017-10-19 RX ADMIN — ACETAMINOPHEN 1000 MG: 160 SOLUTION ORAL at 10:02

## 2017-10-19 RX ADMIN — HUMAN INSULIN 0.5 UNITS/HR: 100 INJECTION, SOLUTION SUBCUTANEOUS at 10:14

## 2017-10-19 RX ADMIN — NAFCILLIN SODIUM: 2 INJECTION, POWDER, LYOPHILIZED, FOR SOLUTION INTRAMUSCULAR; INTRAVENOUS at 19:44

## 2017-10-19 RX ADMIN — NYSTATIN 500000 UNITS: 100000 SUSPENSION ORAL at 17:55

## 2017-10-19 RX ADMIN — ASPIRIN 81 MG CHEWABLE TABLET 81 MG: 81 TABLET CHEWABLE at 10:03

## 2017-10-19 RX ADMIN — NAFCILLIN SODIUM 2 G: 2 INJECTION, POWDER, LYOPHILIZED, FOR SOLUTION INTRAMUSCULAR; INTRAVENOUS at 01:52

## 2017-10-19 RX ADMIN — NAFCILLIN SODIUM 2 G: 2 INJECTION, POWDER, LYOPHILIZED, FOR SOLUTION INTRAMUSCULAR; INTRAVENOUS at 05:43

## 2017-10-19 RX ADMIN — NYSTATIN 500000 UNITS: 100000 SUSPENSION ORAL at 12:14

## 2017-10-19 RX ADMIN — METOPROLOL TARTRATE 12.5 MG: 100 TABLET, FILM COATED ORAL at 19:54

## 2017-10-19 RX ADMIN — FENTANYL CITRATE 50 MCG: 50 INJECTION, SOLUTION INTRAMUSCULAR; INTRAVENOUS at 23:17

## 2017-10-19 RX ADMIN — FLUDEOXYGLUCOSE F-18 12.35 MCI.: 500 INJECTION, SOLUTION INTRAVENOUS at 08:05

## 2017-10-19 RX ADMIN — Medication 300 MG: at 19:50

## 2017-10-19 RX ADMIN — ACETAMINOPHEN 1000 MG: 160 SOLUTION ORAL at 05:03

## 2017-10-19 RX ADMIN — NAFCILLIN SODIUM: 2 INJECTION, POWDER, LYOPHILIZED, FOR SOLUTION INTRAMUSCULAR; INTRAVENOUS at 22:08

## 2017-10-19 RX ADMIN — GENTAMICIN SULFATE 80 MG: 80 INJECTION, SOLUTION INTRAVENOUS at 09:55

## 2017-10-19 RX ADMIN — RIFAMPIN 300 MG: 300 CAPSULE ORAL at 15:10

## 2017-10-19 ASSESSMENT — VISUAL ACUITY
OU: OTHER (SEE COMMENT)

## 2017-10-19 NOTE — PROCEDURES
EEG#:  -2      DATE OF STUDY:  10/18/2017       TYPE OF STUDY:  Inpatient Video EEG monitoring.       DURATION OF STUDY:  23 hours, 53 minutes and 1 second.      HISTORY:  Date 2 of video-EEG monitoring in Cricket Miguel, a 62-year-old with mental status changes.  He has had multiple strokes that are likely related to septic emboli and involved left cerebellar, left MCA and right occipital regions.  He is described as being significantly encephalopathic and concern is raised about ongoing seizures.  He is not currently being treated with anticonvulsant medications.  PRN fentanyl and Precedex is being administered.      TECHNICAL SUMMARY:  EEG was recorded from scalp electrodes applied according to the 10-20 international system.  Additional electrodes were utilized for referencing, artifact detection and recording from all other cerebral regions.  Video was continuously recorded.  Video was reviewed for clinical correlation and to assist with EEG interpretation.      FINDINGS:  While behaviorally awake, a low amplitude 10 Hz rhythm is seen on the right.  Voltage is generally between 10 and 20 microvolts.  This activity is attenuated and disrupted over the left.  Near continuous 4 Hz theta is seen bilaterally, sometimes better developed on the right.  The amplitude is typically in the range of 40 microvolts.  Stimulation is performed according to ICU protocol.  This clearly attenuates the ongoing theta somewhat and a posterior dominant rhythm appears.  As patient is left alone, he descends into sleep.  Sleep spindles are seen that are better developed on the right though they are present bilaterally.  Interestingly, the diffuse theta rhythm that is seen during wakefulness is gone.      INTERICTAL ABNORMALITIES:  No clear epileptiform discharges.      ICTAL ABNORMALITIES:  No electrographic seizures.      IMPRESSION:  Abnormal.  There is evidence of a moderate diffuse encephalopathy.  There is evidence of  additional focal cortical dysfunction over the left hemisphere.  EEG is clearly reactive.  N2 sleep is seen which is distinct from waking background and during which reasonably normal patterns are seen except sleep patterns are attenuated over left hemisphere.  Epileptiform activity or seizures are not seen at any point in this study.         NATAN REYNOSO MD             D: 10/19/2017 08:03   T: 10/19/2017 08:31   MT: JEAN-PIERRE      Name:     ASHTYN STROUD   MRN:      [id number]        Account:        [id number]   :      1955           Procedure Date: 10/18/2017      Document: [id number]

## 2017-10-19 NOTE — PROGRESS NOTES
CV ICU   Progress Note:    S/Interval History: Overnight no events.      O:  Temp: 99.3  F (37.4  C) Temp  Min: 99.1  F (37.3  C)  Max: 100.9  F (38.3  C)  Resp: 23 Resp  Min: 12  Max: 24  SpO2: 93 % SpO2  Min: 93 %  Max: 100 %    No Data Recorded  Heart Rate: 107 Heart Rate  Min: 84  Max: 117  BP: (!) 143/95   Systolic (24hrs), Av , Min:112 , Max:147   Diastolic (24hrs), Av, Min:68, Max:96      Ventilation Mode: CPAP/PS  FiO2 (%): 40 %  Rate Set (breaths/minute): 12 breaths/min  Tidal Volume Set (mL): 500 mL  PEEP (cm H2O): 5 cmH2O  Pressure Support (cm H2O): 7 cmH2O  Oxygen Concentration (%): 40 %  Resp: 23     Date 10/19/17 0700 - 10/20/17 0659   Shift 1023-5046 3466-8466 2744-0181 24 Hour Total   I  N  T  A  K  E   I.V. 330.38   330.38    NG/   420    Enteral 195   195    Shift Total  (mL/kg) 945.38  (10.66)   945.38  (10.66)   O  U  T  P  U  T   Urine 425   425    Shift Total  (mL/kg) 425  (4.79)   425  (4.79)   Weight (kg) 88.7 88.7 88.7 88.7     Physical Exam    General: Intubated, mechanically ventilated, not following commands    Neck: Supple, no tracheal deviation  Cardiovascular: RRR  Pumonary: Non labored breathing on MV. Mild crackles bilaterally   Abdomen: Soft, non distended, non tender.   Ext: No peripheral edema, appropriate distal perfusion   Neuro: Not moving right upper and lower extremity     Lab Results   Component Value Date    WBC 18.6 (H) 10/19/2017    HGB 8.9 (L) 10/19/2017    HCT 27.8 (L) 10/19/2017     10/19/2017     (HH) 10/19/2017    POTASSIUM 3.7 10/19/2017    CHLORIDE 130 (H) 10/19/2017    CO2 22 10/19/2017    BUN 61 (H) 10/19/2017    CR 2.20 (H) 10/19/2017     (H) 10/19/2017    SED 6 2013    DD 1.7 (H) 2016    NTBNPI 2768 (H) 2016    NTBNP 1302 (H) 2016    TROPONIN <0.07 2005    TROPI 0.634 (HH) 10/08/2017    AST 24 10/06/2017    ALT 33 10/06/2017    ALKPHOS 85 10/06/2017    BILITOTAL 1.5 (H) 10/06/2017    INR 1.03  10/08/2017         Recent Labs  Lab 10/19/17  0354 10/18/17  0401 10/16/17  0529 10/15/17  0001   PH 7.49* 7.47* 7.47* 7.49*   PCO2 30* 32* 32* 33*   PO2 144* 100 93 98   HCO3 23 23 24 25   O2PER 40 40.0 60 60       A/P:  rCicket Miguel is a 62 year old with history of AVR and ascending aortic repair in May 2016 presented on 10/6 with acute mental status changes found to have large moises-aortic fluid collection and multiple strokes likely septic emboli.    Neuro: Tylenol scheduled for fevers,continue neuro checks Q2H,  precedex and wake up as able. Okay to use ibuprofen for fevers if needed per neuro. No MRI needed per Neurocritical.    Resp: Inability to protect airway requiring mechanical ventilation, on minimal vent settings, pressure support all day as tolerated. Could wean to extubate if mental status improves. Recheck NIF today   CV: Goal normotension per neurology/neurosurgery. ASA. Metoprolol PO   GI/FEN: Tube feeds to goal, increase free water to 100 mL Q1 H for hypernatremia (goal 145-155, go slow per neuro), will recheck BMPs Q6. +Bowel function. Change NS in continuous infusions and other IV meds to D5   Renal: JOSE non-oliguric, clinically euvolemic   Endo: Insulin gtt for glucose control  ID: Continue current antibiotic regimen pending ID recommendations (Nafcillin, levofloxacin, gentamycin), daily blood cultures negative since 10/13. WBC 18.6 from 14.7 yesterday. Follow up PET scan results. Pan culture   Heme: No bleeding concerns at this time, hold anticoagulation per neuro and neurosurgery  Prophylaxis: H2 blocker, no chemical dvt ppx due to evolving stroke  Dispo: Family meeting today at 2 PM. CVICU        Patient seen with Dr. Matute, staff Intensivist        Sukhwinder Mejía MD  Anesthesiology Resident   [phone]

## 2017-10-19 NOTE — PLAN OF CARE
Problem: Sepsis/Septic Shock (Adult)  Goal: Signs and Symptoms of Listed Potential Problems Will be Absent, Minimized or Managed (Sepsis/Septic Shock)  Signs and symptoms of listed potential problems will be absent, minimized or managed by discharge/transition of care (reference Sepsis/Septic Shock (Adult) CPG).   Outcome: No Change  Neuros remain unchanged.   PET scan done.   Free water increased to 125mL/hr d/t Na.   CC today at 2.   Will continue to monitor and notify MD of any acute concerns.

## 2017-10-19 NOTE — PLAN OF CARE
Problem: Patient Care Overview  Goal: Plan of Care/Patient Progress Review  Outcome: No Change  -Neuro: PERRL, eyes vary between conjugate and dysconjugate, MD aware. coughing/gagging with suction. No other response to stimuli, no withdrawal. Restless/gimacing throughout the night however. MD notified and prn bumps of fentanyl given with no change. Continuous EEG on, no seizures noted. Febrile, Tmax 38.2, tylenol and ice packs   -Cardiac: BPs labile, no hypotensive episodes tonight. 110-140s systolic. Prn hydralazine given x1. HR 110s consistently throughout the night with occasional PVCs. Also had a short run of Vtach. MD aware. +3 edema throughout  -Resp: SIMV settings overnight, PS during the day. Copious amt of oral and ETT secretions. Lungs coarse, dim in bases  -GI: rectal tube in place with output. TF off for PET scan this AM.   -: garza intact, UO increasing overnight to about 200 cc/hr. MD aware  -Skin: mottled, blotchy, sutures to R knee from previous culture  -Pain: prn bumps of fentanyl for perceived pain with minimal effect, MD aware. sched tylenol  -Labs: Na 157, K 3.6 (replaced), Creatinine trending down 2.21, osmolality trending down at 342, WBCs up at 18.4, daily blood cx negative thus far  -Gtts: insulin gtt OFF for PET scan this AM per order  -Access: L radial Arterial line, R TL IJ, PIV x2  -Social: MD had discussion with pt's significant other Meghna overnight regarding goals of care. All questions at this time addressed per Meghna.     Plan: PET scan this AM 0730, WBC scan today, family care conference at 1400 today     Suzan Martinez  10/19/2017  5:36 AM

## 2017-10-19 NOTE — MR AVS SNAPSHOT
After Visit Summary   10/19/2017    Cricket Miguel    MRN: [id number]           Patient Information     Date Of Birth          1955        Visit Information        Provider Department      10/19/2017 7:00 AM [facility] EEG TECH 4 [facility] EEG        Today's Diagnoses     Encephalopathy    -  1       Follow-ups after your visit        Who to contact     Please call your clinic at [phone] to:    Ask questions about your health    Make or cancel appointments    Discuss your medicines    Learn about your test results    Speak to your doctor   If you have compliments or concerns about an experience at your clinic, or if you wish to file a complaint, please contact [facility] Patient Relations at [phone] or email us at [email or web address]         Additional Information About Your Visit        MyChart Information     [employer] gives you secure access to your electronic health record. If you see a primary care provider, you can also send messages to your care team and make appointments. If you have questions, please call your primary care clinic.  If you do not have a primary care provider, please call [phone] and they will assist you.      e[employer] is an electronic gateway that provides easy, online access to your medical records. With eSh[employer], you can request a clinic appointment, read your test results, renew a prescription or communicate with your care team.     To access your existing account, please contact your [facility] Clinic or call [phone] for assistance.        Care EveryWhere ID     This is your Care EveryWhere ID. This could be used by other organizations to access your [place] medical records  [id number]         Blood Pressure from Last 3 Encounters:   10/19/17 (!) 143/95   10/06/17 125/86   02/06/17 132/84    Weight from Last 3 Encounters:   10/19/17 88.7 kg (195 lb 8.8 oz)   02/06/17 91.6 kg (202 lb)   01/09/17 87.5  kg (193 lb)              We Performed the Following     Glucose by meter          Today's Medication Changes      Notice     This visit is during an admission. Changes to the med list made in this visit will be reflected in the After Visit Summary of the admission.             Primary Care Provider Office Phone # Fax #    Dipak Gordillo [phone]       [address] [place] [id number]        Equal Access to Services     [facility] Hospital[facility]ARATH : Hadii aad ku hadasho Soomaali, waaxda luqadaha, qaybta kaalmada adeegyada, waxay idiin hayaan adeeg kharash la'aan . So [facility] [phone].    ATENCIÓN: Si habla español, tiene a mendiola disposición servicios gratuitos de asistencia lingüística. Llame al [phone].    We comply with applicable federal civil rights laws and [place] laws. We do not discriminate on the basis of race, color, national origin, age, disability, sex, sexual orientation, or gender identity.            Thank you!     Thank you for choosing [facility]  for your care. Our goal is always to provide you with excellent care. Hearing back from our patients is one way we can continue to improve our services. Please take a few minutes to complete the written survey that you may receive in the mail after your visit with us. Thank you!             Your Updated Medication List - Protect others around you: Learn how to safely use, store and throw away your medicines at www.disposemymeds.org.      Notice     This visit is during an admission. Changes to the med list made in this visit will be reflected in the After Visit Summary of the admission.

## 2017-10-19 NOTE — PROGRESS NOTES
EEG CLINICAL NEUROPHYSIOLOGY PRELIMINARY REPORT    Video-EEG through 0600 today reviewed. Continues with evidence of moderate diffuse encephalopathy. Evidence of additional focal cortical dysfunction over left hemisphere. Reactive EEG with cycling between sleep and wake. No epileptiform discharges or electrographic seizures. Full report to follow.    Stefan Pham MD  Pager [phone]

## 2017-10-19 NOTE — PROGRESS NOTES
Social Work Services Progress Note    Hospital Day: 14  Date of Initial Social Work Evaluation:  10/11/17  Collaborated with:  Pt's family, interdisciplinary team    Data:    Pt remains in ICU.  Care conference held today.  Those in attendance were as follows:    Pt's family including dtrs Elma and Eloy, and pt's son Christian.  Pt's S.O. Meghna Matute, ICU Attending  Dr. Brambila and Dr. Baker from Neuro-Crit  Matthew Keller, RNCC  Eva Merritt, [facility]    Medical update provided and plan of care discussed.  All questions answered.  Plan to give pt the weekend to determine whether he can be extubated safely.  If he cannot, will pursue trach/peg early next week in preparation for return to OR for cardiac surgery later next week.    Intervention:    Attended care conference and provided emotional support - supportive listening, validation, encouragement, and anticipatory guidance.    Assessment:   Pt's family voices appreciation of conference and assistance.    Plan:    Anticipated Disposition:  Facility:  Likely will need some level of rehab, though TBD pending medical course.    Barriers to d/c plan:  Pt is not medically stable and will need to return to OR.    Follow Up:  SW continues to follow for support to pt and family, resource referral, and d/c planning.    KAMAR Mason, [facility]  Adult ICU Clinical   Pager [phone]

## 2017-10-19 NOTE — PROGRESS NOTES
CVICU PROGRESS NOTE  10/19/2017  Attending: Ramesh Kuo, *    S:   No acute issues overnight. Continues to be febrile but fever curve improving. PST most of the day yesterday. Weaning pressor support    O:   Vitals:    10/19/17 0600 10/19/17 0623 10/19/17 0700 10/19/17 1000   BP: 134/88 132/88 (!) 140/94 (!) 143/95   BP Location:       Pulse:       Resp: 23 20 23   Temp: 100.6  F (38.1  C)  100  F (37.8  C) 99.3  F (37.4  C)   TempSrc:       SpO2: 99%  98% 93%   Weight:       Height:         Vitals:    10/16/17 0000 10/18/17 0300 10/19/17 0400   Weight: 90.5 kg (199 lb 8.3 oz) 90 kg (198 lb 6.6 oz) 88.7 kg (195 lb 8.8 oz)       Intake/Output Summary (Last 24 hours) at 10/19/17 1040  Last data filed at 10/19/17 1000   Gross per 24 hour   Intake          4557.48 ml   Output             3825 ml   Net           732.48 ml       Ventilation Mode: CPAP/PS  FiO2 (%): 40 %  Rate Set (breaths/minute): 12 breaths/min  Tidal Volume Set (mL): 500 mL  PEEP (cm H2O): 5 cmH2O  Pressure Support (cm H2O): 7 cmH2O  Oxygen Concentration (%): 40 %  Resp: 23    Recent Labs  Lab 10/19/17  0354 10/18/17  0401 10/16/17  0529 10/15/17  0001   PH 7.49* 7.47* 7.47* 7.49*   PCO2 30* 32* 32* 33*   PO2 144* 100 93 98   HCO3 23 23 24 25   O2PER 40 40.0 60 60       MAPs:   General: Intubated, lightly sedated, does not follow commands but tracking and trying to mouth words.opens eyes spontaneously.   Neck: Supple, no tracheal deviation  Cardiovascular: RRR  Pumonary: Non labored breathing on MV  Abdomen: Soft, non distended, non tender  Ext: Minimal edema, warm  Neuro: Does not follow commands      Labs:  [unfilled]  GLUCOSE:     Recent Labs  Lab 10/19/17  1013 10/19/17  0354 10/19/17  0157 10/19/17  0028 10/19/17  0027 10/18/17  2158 10/18/17  1944 10/18/17  1940 10/18/17  1758 10/18/17  1543  10/18/17  1055  10/18/17  0742   GLC  --  137*  --   --  142*  --   --  129*  --  126*  --  143*  --  139*   *  --  123* 120*  --  115* 105*   --  116*  --   < >  --   < > 132*   < > = values in this interval not displayed.      Imaging:  No recent imaging      A/P:   Cricket Miguel is a 62 year old with history of AVR and ascending aortic repair in May 2016 presented on 10/6 with acute mental status changes found to have large moises-aortic fluid collection and multiple strokes likely septic emboli.    Neuro: Tylenol scheduled for fevers,continue neuro checks Q2H,  precedex and wake up as able. Okay to use ibuprofen for fevers if needed per neuro.   Resp: Inability to protect airway requiring mechanical ventilation, on minimal vent settings, pressure support trials, if does well then will continue on pressure support  CV: Goal normotension per neurology/neurosurgery. ASA. Low dose metoprolol BID for intermittent SVT  GI/FEN: Tube feeds to goal, continue free water for hypernatremia (goal 145-155, go slow per neuro), will recheck BMPs Q6. +Bowel function  Renal: JOSE non-oliguric, clinically euvolemic   Endo: Insulin gtt for glucose control  ID: Continue current antibiotic regimen pending ID recommendations (Nafcillin, levofloxacin, gentamycin), daily blood cultures negative since 10/13. WBC slowly decreasing. PET scan today. Obtaining pan cultures today  Heme: No bleeding concerns at this time, hold anticoagulation per neuro and neurosurgery  Prophylaxis: H2 blocker, no chemical dvt ppx due to evolving stroke  Dispo: CVICU      Discussed with CVTS fellow.   Staff surgeons have been informed of changes through both  verbal and written communication.        Rula Contreras  General Surgery PGY 4  Cardiothoracic Surgery  [phone]

## 2017-10-19 NOTE — PROGRESS NOTES
CLINICAL NUTRITION SERVICES - BRIEF NOTE    Resp: Remains on vent settings with 40% FiO2    FEN: TF at goal 65 ml/hr    Interventions:  Ordered metabolic cart study to assess current caloric provisions.     Reyna Dutton RD, LD  4E Dietitian pgr [phone]

## 2017-10-19 NOTE — PROCEDURES
EEG#:  -1      DATE OF STUDY:  10/17/2017      TYPE OF STUDY:  Inpatient video EEG monitoring.      DURATION OF RECORDIN hours 22 minutes 31 seconds.      HISTORY:  Day 1 of video EEG monitoring in Cricket Miguel, a 62-year-old with acute mental status changes.  He had multiple strokes that are likely related to septic emboli involving the left cerebellar, left MCA and right occipital regions.  He is significantly encephalopathic.  Concern is raised about ongoing seizures.  He is not currently being treated with anticonvulsant medications during this study.  Dexmedetomidine was utilized in low doses for sedation.     TECHNICAL SUMMARY: This continuous video- EEG monitoring procedure was performed with 23 scalp electrodes in 10-20 electrode system placements, and additional scalp, precordial and other surface electrodes used for electrical referencing and artifact detection.  Video monitoring was utilized and periodically reviewed by EEG technologists and the physician for electroclinical correlations.    FINDINGS:  While awake, a low amplitude 10-11 Hz posterior dominant rhythm was seen on the right.  This activity was significantly disrupted and attenuated on the left.  Persistent 4-5 Hz theta was seen over the central regions and at times was seen diffusely.  Stimulation was performed following ICU protocol at 1748.  Stimulation did reduce the persistence of the diffuse slowing.  At one point (17:50:55) a clear reactivity of the posterior dominant rhythm on the right to eye opening was demonstrated.  The patient does descend into N2 sleep for a portion of the study.  Interestingly, the diffuse slowing attenuates.  Sleep spindles are seen which are attenuated over the left hemisphere.  As patient awakens, a diffuse rhythmic theta and delta are once again noted.      OTHER INTERICTAL ABNORMALITIES:  No epileptiform discharges.      ICTAL ABNORMALITIES:  No electrographic seizures.      IMPRESSION:   Abnormal.  There is evidence of moderate diffuse encephalopathy.  There is evidence of additional focal cortical dysfunction over the left hemisphere.  In spite of these abnormalities, a defined and reactive  posterior dominant rhythm is seen over the right hemisphere and EEG shows clear features of N2 sleep, which is quite distinct from waking.  These indicate that the brainstem thalamocortical circuits responsible for the maintenance and cycling of these rhythms are preserved.  Epileptiform activity or seizures were not seen at any point in this study.         NATAN REYNOSO MD             D: 10/18/2017 18:10   T: 10/18/2017 22:33   MT: CD      Name:     ASHTYN STROUD   MRN:      [id number]        Account:        [id number]   :      1955           Procedure Date: 10/17/2017      Document: [id number]

## 2017-10-20 ENCOUNTER — APPOINTMENT (OUTPATIENT)
Dept: PHYSICAL THERAPY | Facility: CLINIC | Age: 62
DRG: 004 | End: 2017-10-20
Attending: PSYCHIATRY & NEUROLOGY
Payer: COMMERCIAL

## 2017-10-20 LAB
ANION GAP SERPL CALCULATED.3IONS-SCNC: 6 MMOL/L (ref 3–14)
ANION GAP SERPL CALCULATED.3IONS-SCNC: 6 MMOL/L (ref 3–14)
ANION GAP SERPL CALCULATED.3IONS-SCNC: 8 MMOL/L (ref 3–14)
BACTERIA SPEC CULT: NO GROWTH
BACTERIA SPEC CULT: NO GROWTH
BUN SERPL-MCNC: 52 MG/DL (ref 7–30)
BUN SERPL-MCNC: 59 MG/DL (ref 7–30)
BUN SERPL-MCNC: 60 MG/DL (ref 7–30)
CALCIUM SERPL-MCNC: 7 MG/DL (ref 8.5–10.1)
CALCIUM SERPL-MCNC: 7.3 MG/DL (ref 8.5–10.1)
CALCIUM SERPL-MCNC: 7.4 MG/DL (ref 8.5–10.1)
CHLORIDE SERPL-SCNC: 121 MMOL/L (ref 94–109)
CHLORIDE SERPL-SCNC: 122 MMOL/L (ref 94–109)
CHLORIDE SERPL-SCNC: 126 MMOL/L (ref 94–109)
CO2 SERPL-SCNC: 23 MMOL/L (ref 20–32)
CO2 SERPL-SCNC: 23 MMOL/L (ref 20–32)
CO2 SERPL-SCNC: 24 MMOL/L (ref 20–32)
CREAT SERPL-MCNC: 1.93 MG/DL (ref 0.66–1.25)
CREAT SERPL-MCNC: 2.04 MG/DL (ref 0.66–1.25)
CREAT SERPL-MCNC: 2.08 MG/DL (ref 0.66–1.25)
ERYTHROCYTE [DISTWIDTH] IN BLOOD BY AUTOMATED COUNT: 17.9 % (ref 10–15)
GFR SERPL CREATININE-BSD FRML MDRD: 32 ML/MIN/1.7M2
GFR SERPL CREATININE-BSD FRML MDRD: 33 ML/MIN/1.7M2
GFR SERPL CREATININE-BSD FRML MDRD: 35 ML/MIN/1.7M2
GLUCOSE BLDC GLUCOMTR-MCNC: 119 MG/DL (ref 70–99)
GLUCOSE BLDC GLUCOMTR-MCNC: 131 MG/DL (ref 70–99)
GLUCOSE BLDC GLUCOMTR-MCNC: 132 MG/DL (ref 70–99)
GLUCOSE BLDC GLUCOMTR-MCNC: 132 MG/DL (ref 70–99)
GLUCOSE BLDC GLUCOMTR-MCNC: 133 MG/DL (ref 70–99)
GLUCOSE BLDC GLUCOMTR-MCNC: 137 MG/DL (ref 70–99)
GLUCOSE BLDC GLUCOMTR-MCNC: 140 MG/DL (ref 70–99)
GLUCOSE SERPL-MCNC: 135 MG/DL (ref 70–99)
GLUCOSE SERPL-MCNC: 139 MG/DL (ref 70–99)
GLUCOSE SERPL-MCNC: 187 MG/DL (ref 70–99)
HCO3 BLD-SCNC: 24 MMOL/L (ref 21–28)
HCT VFR BLD AUTO: 24.4 % (ref 40–53)
HGB BLD-MCNC: 7.7 G/DL (ref 13.3–17.7)
MAGNESIUM SERPL-MCNC: 2.4 MG/DL (ref 1.6–2.3)
MAGNESIUM SERPL-MCNC: 2.6 MG/DL (ref 1.6–2.3)
MCH RBC QN AUTO: 30 PG (ref 26.5–33)
MCHC RBC AUTO-ENTMCNC: 31.6 G/DL (ref 31.5–36.5)
MCV RBC AUTO: 95 FL (ref 78–100)
O2/TOTAL GAS SETTING VFR VENT: 40 %
OSMOLALITY SERPL: 332 MMOL/KG (ref 280–301)
OSMOLALITY SERPL: 334 MMOL/KG (ref 280–301)
OSMOLALITY SERPL: 348 MMOL/KG (ref 280–301)
OSMOLALITY SERPL: 348 MMOL/KG (ref 280–301)
PCO2 BLD: 38 MM HG (ref 35–45)
PH BLD: 7.4 PH (ref 7.35–7.45)
PHOSPHATE SERPL-MCNC: 3.9 MG/DL (ref 2.5–4.5)
PHOSPHATE SERPL-MCNC: 4.2 MG/DL (ref 2.5–4.5)
PLATELET # BLD AUTO: 334 10E9/L (ref 150–450)
PO2 BLD: 94 MM HG (ref 80–105)
POTASSIUM SERPL-SCNC: 3.4 MMOL/L (ref 3.4–5.3)
POTASSIUM SERPL-SCNC: 3.7 MMOL/L (ref 3.4–5.3)
POTASSIUM SERPL-SCNC: 3.8 MMOL/L (ref 3.4–5.3)
RBC # BLD AUTO: 2.57 10E12/L (ref 4.4–5.9)
SODIUM SERPL-SCNC: 149 MMOL/L (ref 133–144)
SODIUM SERPL-SCNC: 151 MMOL/L (ref 133–144)
SODIUM SERPL-SCNC: 158 MMOL/L (ref 133–144)
SPECIMEN SOURCE: NORMAL
SPECIMEN SOURCE: NORMAL
WBC # BLD AUTO: 13.5 10E9/L (ref 4–11)

## 2017-10-20 PROCEDURE — 84100 ASSAY OF PHOSPHORUS: CPT | Performed by: ORTHOPAEDIC SURGERY

## 2017-10-20 PROCEDURE — 85027 COMPLETE CBC AUTOMATED: CPT | Performed by: ORTHOPAEDIC SURGERY

## 2017-10-20 PROCEDURE — 25000128 H RX IP 250 OP 636: Performed by: STUDENT IN AN ORGANIZED HEALTH CARE EDUCATION/TRAINING PROGRAM

## 2017-10-20 PROCEDURE — 25000128 H RX IP 250 OP 636: Performed by: THORACIC SURGERY (CARDIOTHORACIC VASCULAR SURGERY)

## 2017-10-20 PROCEDURE — 25000132 ZZH RX MED GY IP 250 OP 250 PS 637: Performed by: STUDENT IN AN ORGANIZED HEALTH CARE EDUCATION/TRAINING PROGRAM

## 2017-10-20 PROCEDURE — 80048 BASIC METABOLIC PNL TOTAL CA: CPT | Performed by: STUDENT IN AN ORGANIZED HEALTH CARE EDUCATION/TRAINING PROGRAM

## 2017-10-20 PROCEDURE — 99291 CRITICAL CARE FIRST HOUR: CPT | Mod: GC | Performed by: ANESTHESIOLOGY

## 2017-10-20 PROCEDURE — 40000193 ZZH STATISTIC PT WARD VISIT

## 2017-10-20 PROCEDURE — 00000146 ZZHCL STATISTIC GLUCOSE BY METER IP

## 2017-10-20 PROCEDURE — 36415 COLL VENOUS BLD VENIPUNCTURE: CPT | Performed by: STUDENT IN AN ORGANIZED HEALTH CARE EDUCATION/TRAINING PROGRAM

## 2017-10-20 PROCEDURE — 25000125 ZZHC RX 250: Performed by: STUDENT IN AN ORGANIZED HEALTH CARE EDUCATION/TRAINING PROGRAM

## 2017-10-20 PROCEDURE — 25000132 ZZH RX MED GY IP 250 OP 250 PS 637: Performed by: SURGERY

## 2017-10-20 PROCEDURE — 97110 THERAPEUTIC EXERCISES: CPT | Mod: GP

## 2017-10-20 PROCEDURE — 25000132 ZZH RX MED GY IP 250 OP 250 PS 637: Performed by: THORACIC SURGERY (CARDIOTHORACIC VASCULAR SURGERY)

## 2017-10-20 PROCEDURE — 27210913 ZZH NUTRITION PRODUCT INTERMEDIATE PACKET

## 2017-10-20 PROCEDURE — 83735 ASSAY OF MAGNESIUM: CPT | Performed by: STUDENT IN AN ORGANIZED HEALTH CARE EDUCATION/TRAINING PROGRAM

## 2017-10-20 PROCEDURE — 40000014 ZZH STATISTIC ARTERIAL MONITORING DAILY

## 2017-10-20 PROCEDURE — 25000132 ZZH RX MED GY IP 250 OP 250 PS 637: Performed by: PHYSICIAN ASSISTANT

## 2017-10-20 PROCEDURE — 83735 ASSAY OF MAGNESIUM: CPT | Performed by: ORTHOPAEDIC SURGERY

## 2017-10-20 PROCEDURE — 80048 BASIC METABOLIC PNL TOTAL CA: CPT | Performed by: ORTHOPAEDIC SURGERY

## 2017-10-20 PROCEDURE — 40000196 ZZH STATISTIC RAPCV CVP MONITORING

## 2017-10-20 PROCEDURE — 27210432 ZZH NUTRITION PRODUCT RENAL BASIC LITER

## 2017-10-20 PROCEDURE — 94003 VENT MGMT INPAT SUBQ DAY: CPT

## 2017-10-20 PROCEDURE — 20000004 ZZH R&B ICU UMMC

## 2017-10-20 PROCEDURE — S0032 INJECTION, NAFCILLIN SODIUM: HCPCS | Performed by: STUDENT IN AN ORGANIZED HEALTH CARE EDUCATION/TRAINING PROGRAM

## 2017-10-20 PROCEDURE — 83930 ASSAY OF BLOOD OSMOLALITY: CPT | Performed by: STUDENT IN AN ORGANIZED HEALTH CARE EDUCATION/TRAINING PROGRAM

## 2017-10-20 PROCEDURE — 83930 ASSAY OF BLOOD OSMOLALITY: CPT | Performed by: ORTHOPAEDIC SURGERY

## 2017-10-20 PROCEDURE — 40000275 ZZH STATISTIC RCP TIME EA 10 MIN

## 2017-10-20 PROCEDURE — 87040 BLOOD CULTURE FOR BACTERIA: CPT | Performed by: STUDENT IN AN ORGANIZED HEALTH CARE EDUCATION/TRAINING PROGRAM

## 2017-10-20 PROCEDURE — 25000128 H RX IP 250 OP 636: Performed by: NEUROLOGICAL SURGERY

## 2017-10-20 PROCEDURE — 82803 BLOOD GASES ANY COMBINATION: CPT | Performed by: ORTHOPAEDIC SURGERY

## 2017-10-20 PROCEDURE — 84100 ASSAY OF PHOSPHORUS: CPT | Performed by: STUDENT IN AN ORGANIZED HEALTH CARE EDUCATION/TRAINING PROGRAM

## 2017-10-20 RX ORDER — AMINO ACIDS/PROTEIN HYDROLYS 11G-40/45
1 LIQUID IN PACKET (ML) ORAL DAILY
Status: DISCONTINUED | OUTPATIENT
Start: 2017-10-20 | End: 2017-11-04 | Stop reason: HOSPADM

## 2017-10-20 RX ORDER — FUROSEMIDE 10 MG/ML
20 INJECTION INTRAMUSCULAR; INTRAVENOUS ONCE
Status: COMPLETED | OUTPATIENT
Start: 2017-10-20 | End: 2017-10-20

## 2017-10-20 RX ORDER — NICOTINE POLACRILEX 4 MG
15-30 LOZENGE BUCCAL
Status: DISCONTINUED | OUTPATIENT
Start: 2017-10-20 | End: 2017-11-04 | Stop reason: HOSPADM

## 2017-10-20 RX ORDER — FAMOTIDINE 40 MG/5ML
20 POWDER, FOR SUSPENSION ORAL EVERY 24 HOURS
Status: DISCONTINUED | OUTPATIENT
Start: 2017-10-20 | End: 2017-11-03

## 2017-10-20 RX ORDER — FAMOTIDINE 40 MG/5ML
20 POWDER, FOR SUSPENSION ORAL EVERY 24 HOURS
Status: DISCONTINUED | OUTPATIENT
Start: 2017-10-20 | End: 2017-10-20

## 2017-10-20 RX ORDER — LIDOCAINE 40 MG/G
CREAM TOPICAL
Status: DISCONTINUED | OUTPATIENT
Start: 2017-10-20 | End: 2017-10-31

## 2017-10-20 RX ORDER — HEPARIN SODIUM,PORCINE 10 UNIT/ML
2-5 VIAL (ML) INTRAVENOUS
Status: DISCONTINUED | OUTPATIENT
Start: 2017-10-20 | End: 2017-11-04 | Stop reason: HOSPADM

## 2017-10-20 RX ORDER — DEXTROSE MONOHYDRATE 25 G/50ML
25-50 INJECTION, SOLUTION INTRAVENOUS
Status: DISCONTINUED | OUTPATIENT
Start: 2017-10-20 | End: 2017-11-04 | Stop reason: HOSPADM

## 2017-10-20 RX ADMIN — ACETAMINOPHEN 1000 MG: 160 SOLUTION ORAL at 13:56

## 2017-10-20 RX ADMIN — FENTANYL CITRATE 50 MCG: 50 INJECTION, SOLUTION INTRAMUSCULAR; INTRAVENOUS at 03:31

## 2017-10-20 RX ADMIN — Medication 300 MG: at 08:49

## 2017-10-20 RX ADMIN — NYSTATIN 500000 UNITS: 100000 SUSPENSION ORAL at 06:27

## 2017-10-20 RX ADMIN — FUROSEMIDE 20 MG: 10 INJECTION, SOLUTION INTRAVENOUS at 08:58

## 2017-10-20 RX ADMIN — ACETAMINOPHEN 1000 MG: 160 SOLUTION ORAL at 20:19

## 2017-10-20 RX ADMIN — MULTIVITAMIN 15 ML: LIQUID ORAL at 08:58

## 2017-10-20 RX ADMIN — NAFCILLIN SODIUM: 2 INJECTION, POWDER, LYOPHILIZED, FOR SOLUTION INTRAMUSCULAR; INTRAVENOUS at 13:10

## 2017-10-20 RX ADMIN — METOPROLOL TARTRATE 12.5 MG: 100 TABLET, FILM COATED ORAL at 08:44

## 2017-10-20 RX ADMIN — NAFCILLIN SODIUM: 2 INJECTION, POWDER, LYOPHILIZED, FOR SOLUTION INTRAMUSCULAR; INTRAVENOUS at 06:27

## 2017-10-20 RX ADMIN — Medication 1 PACKET: at 20:19

## 2017-10-20 RX ADMIN — NYSTATIN 500000 UNITS: 100000 SUSPENSION ORAL at 12:00

## 2017-10-20 RX ADMIN — METOPROLOL TARTRATE 12.5 MG: 100 TABLET, FILM COATED ORAL at 21:23

## 2017-10-20 RX ADMIN — NAFCILLIN SODIUM: 2 INJECTION, POWDER, LYOPHILIZED, FOR SOLUTION INTRAMUSCULAR; INTRAVENOUS at 21:22

## 2017-10-20 RX ADMIN — FENTANYL CITRATE 50 MCG: 50 INJECTION, SOLUTION INTRAMUSCULAR; INTRAVENOUS at 21:08

## 2017-10-20 RX ADMIN — NYSTATIN 500000 UNITS: 100000 SUSPENSION ORAL at 17:07

## 2017-10-20 RX ADMIN — LEVOFLOXACIN 750 MG: 5 INJECTION, SOLUTION INTRAVENOUS at 09:08

## 2017-10-20 RX ADMIN — HYDRALAZINE HYDROCHLORIDE 10 MG: 20 INJECTION INTRAMUSCULAR; INTRAVENOUS at 03:27

## 2017-10-20 RX ADMIN — NAFCILLIN SODIUM: 2 INJECTION, POWDER, LYOPHILIZED, FOR SOLUTION INTRAMUSCULAR; INTRAVENOUS at 02:02

## 2017-10-20 RX ADMIN — DEXMEDETOMIDINE HYDROCHLORIDE 0.2 MCG/KG/HR: 4 INJECTION, SOLUTION INTRAVENOUS at 04:50

## 2017-10-20 RX ADMIN — NYSTATIN 500000 UNITS: 100000 SUSPENSION ORAL at 00:29

## 2017-10-20 RX ADMIN — Medication 300 MG: at 21:22

## 2017-10-20 RX ADMIN — FENTANYL CITRATE 50 MCG: 50 INJECTION, SOLUTION INTRAMUSCULAR; INTRAVENOUS at 02:01

## 2017-10-20 RX ADMIN — ACETAMINOPHEN 1000 MG: 160 SOLUTION ORAL at 09:00

## 2017-10-20 RX ADMIN — ASPIRIN 81 MG CHEWABLE TABLET 81 MG: 81 TABLET CHEWABLE at 08:58

## 2017-10-20 RX ADMIN — NAFCILLIN SODIUM: 2 INJECTION, POWDER, LYOPHILIZED, FOR SOLUTION INTRAMUSCULAR; INTRAVENOUS at 09:48

## 2017-10-20 RX ADMIN — GENTAMICIN SULFATE 80 MG: 80 INJECTION, SOLUTION INTRAVENOUS at 08:30

## 2017-10-20 RX ADMIN — NAFCILLIN SODIUM: 2 INJECTION, POWDER, LYOPHILIZED, FOR SOLUTION INTRAMUSCULAR; INTRAVENOUS at 16:31

## 2017-10-20 RX ADMIN — FAMOTIDINE 20 MG: 40 POWDER, FOR SUSPENSION ORAL at 11:37

## 2017-10-20 ASSESSMENT — VISUAL ACUITY
OU: OTHER (SEE COMMENT)

## 2017-10-20 NOTE — PROGRESS NOTES
CV ICU   Progress Note:    S/Interval History:  Overnight no events.      O:  Temp: 99.9  F (37.7  C) Temp  Min: 99.1  F (37.3  C)  Max: 99.9  F (37.7  C)  Resp: 14 Resp  Min: 11  Max: 23  SpO2: 100 % SpO2  Min: 96 %  Max: 100 %    No Data Recorded  Heart Rate: 91 Heart Rate  Min: 78  Max: 101  BP: 150/76 Systolic (24hrs), Av , Min:150 , Max:150   Diastolic (24hrs), Av, Min:76, Max:76    Ventilation Mode: CPAP/PS  FiO2 (%): 40 %  Rate Set (breaths/minute): 12 breaths/min  Tidal Volume Set (mL): 500 mL  PEEP (cm H2O): 5 cmH2O  Pressure Support (cm H2O): 7 cmH2O  Oxygen Concentration (%): 40 %  Resp: 14     Date 10/20/17 0700 - 10/21/17 0659   Shift 9912-6084 5345-1429 8702-1979 24 Hour Total   I  N  T  A  K  E   I.V. 120.4   120.4    NG/   500    Enteral 390   390    Shift Total  (mL/kg) 1010.4  (10.84)   1010.4  (10.84)   O  U  T  P  U  T   Urine 400   400    Shift Total  (mL/kg) 400  (4.29)   400  (4.29)   Weight (kg) 93.2 93.2 93.2 93.2       Physical Exam    General: Intubated, mechanically ventilated, not following commands    Neck: Supple, no tracheal deviation  Cardiovascular: RRR  Pumonary: Non labored breathing on MV. Mild crackles bilaterally   Abdomen: Soft, non distended, non tender.   Ext: No peripheral edema, appropriate distal perfusion   Neuro: Not moving right upper and lower extremity     Lab Results   Component Value Date    WBC 13.5 (H) 10/20/2017    HGB 7.7 (L) 10/20/2017    HCT 24.4 (L) 10/20/2017     10/20/2017     (H) 10/20/2017    POTASSIUM 3.8 10/20/2017    CHLORIDE 126 (H) 10/20/2017    CO2 23 10/20/2017    BUN 60 (H) 10/20/2017    CR 2.08 (H) 10/20/2017     (H) 10/20/2017    SED 6 2013    DD 1.7 (H) 2016    NTBNPI 2768 (H) 2016    NTBNP 1302 (H) 2016    TROPONIN <0.07 2005    TROPI 0.634 (HH) 10/08/2017    AST 24 10/06/2017    ALT 33 10/06/2017    ALKPHOS 85 10/06/2017    BILITOTAL 1.5 (H) 10/06/2017    INR 1.03 10/08/2017          Recent Labs  Lab 10/20/17  0408 10/19/17  0354 10/18/17  0401 10/16/17  0529   PH 7.40 7.49* 7.47* 7.47*   PCO2 38 30* 32* 32*   PO2 94 144* 100 93   HCO3 24 23 23 24   O2PER 40 40 40.0 60         A/P:  Cricket Miguel is a 62 year old with history of AVR and ascending aortic repair in May 2016 presented on 10/6 with acute mental status changes found to have large moises-aortic fluid collection and multiple strokes likely septic emboli.    Neuro: Tylenol scheduled for fevers,continue neuro checks Q4H,  precedex and wake up as able. Okay to use ibuprofen for fevers if needed per neuro.     Resp: Inability to protect airway requiring mechanical ventilation, on minimal vent settings, pressure support all day as tolerated. Could wean to extubate if mental status improves. Not following commands, unable to test NIF today.   CV: Goal normotension per neurology/neurosurgery. ASA. Metoprolol PO   GI/FEN: Tube feeds to goal, increase free water to 100 mL Q1 H for hypernatremia (goal 145-155, go slow per neuro), will recheck BMPs Q6. +Bowel function. Change NS in continuous infusions and other IV meds to D5   Renal: JOSE non-oliguric, give a one time dose of lasix 20 mg IV    Endo: Insulin gtt for glucose control  ID: Continue current antibiotic regimen pending ID recommendations (Nafcillin, levofloxacin, gentamycin), daily blood cultures negative since 10/13.  Afebrile, WBC 13.5 from 18.6 yesterday. Discontinue Gentamicin on 10/21  Heme: No bleeding concerns at this time, hold anticoagulation per neuro and neurosurgery  Prophylaxis: H2 blocker, no chemical dvt ppx due to evolving stroke  Dispo: Family meeting today at 2 PM. CVICU         Patient seen with Dr. Matute, staff Intensivist        Sukhwinder Mejía MD  Anesthesiology Resident   [phone]

## 2017-10-20 NOTE — PLAN OF CARE
Problem: Patient Care Overview  Goal: Plan of Care/Patient Progress Review  Discharge Planner PT   Patient plan for discharge: unable to state  Current status: Provided PROM to all joints in all major planes as appropriate. Pt with no command following and only opening eyes spontaneously, but not on command. Educated SO on PROM to appropriate joints on non-therapy days.   Barriers to return to prior living situation: bed bound, medical status  Recommendations for discharge: inpatient rehab  Rationale for recommendations: Pt is well below functional baseline.        Entered by: Sadia Euceda 10/20/2017 12:50 PM

## 2017-10-20 NOTE — PROGRESS NOTES
CVTS PROGRESS NOTE  10/20/2017  Attending: Ramesh Kuo, *    S:   No acute issues overnight. Continuing with pressure support on vent.    O:   Vitals:    10/20/17 0700 10/20/17 0800 10/20/17 0900 10/20/17 1000   BP:       BP Location:       Pulse:       Resp: 16 11 19 17   Temp:  99.9  F (37.7  C)     TempSrc:  Bladder     SpO2: 100% 100% 99% 100%   Weight:       Height:         Vitals:    10/18/17 0300 10/19/17 0400 10/20/17 0400   Weight: 90 kg (198 lb 6.6 oz) 88.7 kg (195 lb 8.8 oz) 93.2 kg (205 lb 7.5 oz)         Intake/Output Summary (Last 24 hours) at 10/20/17 1051  Last data filed at 10/20/17 1000   Gross per 24 hour   Intake           5545.6 ml   Output             4425 ml   Net           1120.6 ml           Ventilation Mode: CPAP/PS  FiO2 (%): 40 %  Rate Set (breaths/minute): 12 breaths/min  Tidal Volume Set (mL): 500 mL  PEEP (cm H2O): 5 cmH2O  Pressure Support (cm H2O): 7 cmH2O  Oxygen Concentration (%): 40 %  Resp: 17    Recent Labs  Lab 10/20/17  0408 10/19/17  0354 10/18/17  0401 10/16/17  0529   PH 7.40 7.49* 7.47* 7.47*   PCO2 38 30* 32* 32*   PO2 94 144* 100 93   HCO3 24 23 23 24   O2PER 40 40 40.0 60       MAPs:   General: Intubated, lightly sedated, does not follow commands but tracking and trying to mouth words.opens eyes spontaneously.   Neck: Supple, no tracheal deviation  Cardiovascular: RRR  Pumonary: Non labored breathing on MV  Abdomen: Soft, non distended, non tender  Ext: Minimal edema, warm  Neuro: Does not follow commands      Labs:  [unfilled]  GLUCOSE:     Recent Labs  Lab 10/20/17  0642 10/20/17  0412 10/20/17  0205 10/20/17  0021 10/19/17  2207 10/19/17  2001  10/19/17  1608  10/19/17  1013  10/19/17  0354  10/19/17  0027   GLC  --  135*  --   --  130*  --   --  128*  --  158*  --  137*  --  142*   * 119* 133* 140* 120* 116*  < > 125*  < > 145*  < >  --   < >  --    < > = values in this interval not displayed.      Imaging:  No recent imaging      A/P:   Cricket BUSCH  Lamont is a 62 year old with history of AVR and ascending aortic repair in May 2016 presented on 10/6 with acute mental status changes found to have large moises-aortic fluid collection and multiple strokes likely septic emboli.    Neuro: Tylenol scheduled for fevers,continue neuro checks Q2H,  precedex and wake up as able. Okay to use ibuprofen for fevers if needed per neuro.   Resp: Inability to protect airway requiring mechanical ventilation, on minimal vent settings, did well with pressure support. Will continue to do trials today. Will obtain a NIF and VC today.   CV: Goal normotension per neurology/neurosurgery. ASA. Low dose metoprolol BID for intermittent SVT  GI/FEN: Tube feeds to goal, continue free water for hypernatremia (goal 145-155, go slow per neuro), will recheck BMPs Q6. +Bowel function  Renal: JOSE non-oliguric, clinically euvolemic   Endo: Insulin gtt discontinued, started on SSI  ID: Continue current antibiotic regimen pending ID recommendations (Nafcillin, levofloxacin, gentamycin), daily blood cultures negative since 10/13. WBC slowly decreasing. PET scan done showed increased uptake around the heart and knee. Obtaining pan cultures today  Heme: No bleeding concerns at this time, hold anticoagulation per neuro and neurosurgery  Prophylaxis: H2 blocker, no chemical dvt ppx due to evolving stroke  Dispo: CVICU      Discussed with CVTS fellow.   Staff surgeons have been informed of changes through both  verbal and written communication.        Rula Contreras  General Surgery PGY 4  Cardiothoracic Surgery  [phone]

## 2017-10-20 NOTE — PROGRESS NOTES
CLINICAL NUTRITION SERVICES - REASSESSMENT NOTE     Nutrition Prescription    RECOMMENDATIONS FOR MDs/PROVIDERS TO ORDER:  Free water per MD pending Na goals    Malnutrition Status:    Pt does not meet criteria for malnutrition    Recommendations already ordered by Registered Dietitian (RD):  1. Change to TwoCal HN @ 50 ml/hr (1200 ml/day) to provide 2400 kcals, 101 g PRO, 840 ml free H2O, 263 g CHO and 6 g Fiber daily.    2. Add 1 pkt ProSource daily (40 kcal and 11 g PRO) to provide total 2440 kcal (85% MREE or 33 kcal/kg) and 112 g PRO (1.5 g/kg)    Future/Additional Recommendations:  If remains intubated, recommend obtain repeat metabolic cart study to assess approp of energy provisions to avoid over/under feeding.     EVALUATION OF THE PROGRESS TOWARD GOALS   Diet: NPO    Enteral Access: NDT placed by RD on 10/9    Nutrition Support:  Impact Peptide @ goal 65 ml/hr (1560 ml/day) to provide 2340 kcals (82% MREE or 32 kcal/kg/day), 147 g PRO (2 g/kg/day), 1201 ml free H2O, 100 g Fat (50% from MCTs), 218 g CHO and no Fiber daily.    Intake: Average total intake (TF + propofol) = 2185 kcal (29 kcal/kg) and 132 g PRO (1.8 g/kg).    NEW FINDINGS   -Resp/CV: Obtained metabolic cart study 10/19 @ 1641 with the following results: MREE = 2861 kcals/day (equiv to 38 kcal/kg/day) with RQ = 0.8.  Pt received 1170 ml of TF in 24 hours preceding the study providing 1755 kcals (61% MREE).  RQ is within physiologic range; RQ is logical given provisions (kcals and PRO) received prior to study.  Would aim energy intakes minimally at 80% of this MREE (equiv to 31 kcal/kg/day) as MREE/RQ remains consistent with previous study.    -Wt: lowest wt 88.7 kg kg on 10/19 - 4.5% wt loss since admission. Continue dosing wt 75 kg.   -GI: Last BM recorded 10/18. On bowel regimen.  -Skin: Satish 10. On certavite for additional micronutrients   -Labs: Na 158 (H). On free water per MD @ 125 mL/hr.  on 10/9 --> 48 on 10/16.    REASSESSED  NUTRITION NEEDS per 75 kg  Estimated Energy Needs: 2286 - 2861 kcals/day (80 - 100% MREE or 31 - 38 kcals/kg)  Justification: per MREE    MALNUTRITION  % Intake: None noted  % Weight Loss: Does not meet criteria  Subcutaneous Fat Loss: None observed  Muscle Loss: Temporal and Upper leg (quadricep, hamstring): mild  Fluid Accumulation/Edema: None noted  Malnutrition Diagnosis: Pt does not meet criteria for malnutrition    Previous Goals   Total avg nutritional intake to meet a minimum of 80% MREE (31 kcal/kg) and 1.2 g PRO/kg daily (per dosing wt 75 kg).  Evaluation: Not quite met for kcals, met for protein    Previous Nutrition Diagnosis  Inadequate protein-energy intake related to inadequate EN infusion, increased metabolic needs as evidenced by TF provided 5-day average of 42% MREE and 60% protein needs (vs minimum needs listed above), 4% wt loss since admission  Evaluation: resolving/improving    CURRENT NUTRITION DIAGNOSIS  Inadequate energy intake related to TF interruptions AEB average intake over past week met 29 kcal/kg (with needs of 31 kcal/kg).     INTERVENTIONS  Implementation  Changed to standard formula (transition off Immune-modulating formula w/ CRP trend)    Goals  Total avg nutritional intake to meet a minimum of 80% MREE (31 kcal/kg) and 1.2 g PRO/kg daily (per dosing wt 75 kg).    Monitoring/Evaluation  Progress toward goals will be monitored and evaluated per protocol.    Maricel Childers RD, LD, [facility]  CV[facility] Dietitian  Pager: [phone]

## 2017-10-20 NOTE — PROGRESS NOTES
Orthopaedic Progress Note:     Ortho Team was contacted for re-assess patient's left knee for infection, given the upcoming need for cardiac intervention. He had a knee aspiration done on 10/6 which showed a relatively low WBC count of 3897. However, the synovial fluid grew Staph Aureus. Although this was felt to be likely a contaminant, given the clinical situation, we elected to wash out the knee for assurance. He had an I&D done on 10/8 which showed very benign intra-op findings without gross evidence of infection. Since then, his knee has appeared to heal very well with no ongoing drainage or worsening clinical exams. A PET scan was done on 10/19 to evaluate aorta and knee, which showed mild uptake around left knee.    Exam:   Intubated, eyes open, appears to respond somewhat to questions  LLE: Very mild effusion, no appreciable warmth when compared to right knee. Sutures in place over portal incisions which are dry and closed, no drainage. No signs of pain with passive ROM from 0-110 or direct palpation over medial/lateral joint line or with patellar medial/lateral motion. DP pulse 2+, feet wwp.     Labs:  Blood Cultures from 10/16-10/20: NGTD  WBC: 24.4 (10/15) -> 18.3 -> 15.8 -> 14.7 -> 18.6 -> 13.5 (10/20)  CRP: 240 (10/9) -> 48 (10/16)    Assessment:   After evaluating the patient's knee and reviewing previous lab and intra-op findings from the I&D, we feel that it is very unlikely that there is an active infection in the patient's left knee. At this point, aspirating the knee is not recommended given the low clinical suspicion for infection and potential for seeding the knee with bacteria from multiple aspirations/interventions. We believe that the PET findings of increased uptake around the knee are most likely post-operative changes that do not reflect active infection.     We discussed our impression with the cardio team. If there are further concerns, please feel free to contact us to discuss them.      Adiel Hernadez MD  Orthopaedic Surgery PGY-1

## 2017-10-20 NOTE — PLAN OF CARE
Problem: Patient Care Overview  Goal: Plan of Care/Patient Progress Review  D:  large moises-aortic fluid collection and multiple strokes likely septic emboli.       A/I:  Neuro: withdraws on LLE only, opens eyes to voice, pupils equal and reactive, pt was thrashing his head, coughing on the vent, and hypertensive, fentanyl 50 mcg given x3 with decreasing effect with each dose, pt was started on dex @ 0.2 mcg/kg/min with good response.  Resp: PS 7/5, RR 15-20, lungs clear to coarse, large amount of white frothy secretions from ETT  CV: NSR  /110 when agitated, now 110-140 on dex, T max 99.6F, scheduled tylenol  GI: TF @ 65 (goal),  cc/hr, Na 158, osmo still high, neurology MD stated he was ok with those numbers, insulin gtt @ 1 unit/hr, RT in place, watery stool  : Espinoza, UOP > 150 cc/hr   Skin: no new issues     P: possibly trial exutbation on Saturday, possible procedures to correct infection in the L knee and his heart next week, monitor, notify with concerns.

## 2017-10-20 NOTE — PROGRESS NOTES
Neuroscience Intensive Care Progress Note    10/19/2017  Mr. Cricket Miguel is a 62 year old gentleman w/ h/o AVR and ascending aortic repair in 2016, 1st degree AVB present, observing with serial EKGs. admitted with acute mental status changes found to have large moises-aortic fluid collection and multiple strokes likely septic emboli. The strokes involved the L cerebellar, L MCA, and R occipital regions. Small microhemorrhages were observed.  Concern for moises-infarct edema leading to expansion of the cerebellar infarct. Cerebral angiogram showed no evidence of mycotic aneurysm. NeuroICU and Neurosurgery following.  Hemodynamically stable off pressors.  Blood cultures growing MSSA now clearing intermittently.  Likely source of L knee septic arthritis which underwent arthroscopic irrigation and debridement 10/8.  On nafcillin/levaquin/gent - appreciate ID assistance.  JOSE present but improving.  Current plan for non-contrast CT abdomen early this week to evaluate prior signs of R renal pyelonephritis. Tagged WBC scan later this week.  Possible surgery as early as the week of Oct 23rd.  Attempting to lighten sedation and attempt extubation this week though he has been slow to awaken from sedation likely due to his inflammatory state in the setting of multiple strokes.  He is also increasingly febrile over the last two days.  His antipyretic regimen was intensified today.  Plan for family meeting this week after the tagged WBC scan to discuss treatment options.    24 hour events:  Pt is off sedation, open his eyes spontaneously, Small sluggishly reactive pupil bilaterally approximately 2mm - Left oculocephalic & LT Corneal abscent with mild intermittent nystagmus to the RT side , ON PRESURE SUPPORT, Has EEG shows moderate encephalopathy with no seizures.    24 Hour Vital Signs Summary:  Temperatures:  Current - Temp: 101.1  F (38.4  C); Max - Temp  Av.8  F (38.2  C)  Min: 99.9  F (37.7  C)  Max: 101.5  F (38.6   C)  Respiration range: Resp  Av.4  Min: 16  Max: 20  Pulse range: No Data Recorded  Blood pressure range: Systolic (24hrs), Av , Min:94 , Max:175   ; Diastolic (24hrs), Av, Min:57, Max:98    Pulse oximetry range: SpO2  Av.2 %  Min: 93 %  Max: 97 %    Ventilator Settings  Ventilation Mode: CPAP/PS  FiO2 (%): 40 %  Rate Set (breaths/minute): 12 breaths/min  Tidal Volume Set (mL): 500 mL  PEEP (cm H2O): 5 cmH2O  Pressure Support (cm H2O): 7 cmH2O  Oxygen Concentration (%): 40 %  Resp: 21      Intake/Output Summary (Last 24 hours) at 10/13/17 1808  Last data filed at 10/13/17 1700   Gross per 24 hour   Intake          3939.68 ml   Output             3025 ml   Net           914.68 ml       Arterial Line BP: ()/(59-95) 129/65  MAP:  [75 mmHg-102 mmHg] 86 mmHg  BP - Mean:  [] 114  CVP:  [8 mmHg-10 mmHg] 10 mmHg    Current Medications:    metoprolol  12.5 mg Oral BID     IV infusion builder WITH LARGE additive list   Intravenous Q4H     rifampin  300 mg Oral or Feeding Tube BID     acetaminophen  1,000 mg Oral or Feeding Tube Q6H     gentamicin  80 mg Intravenous Q24H     nystatin  500,000 Units Swish & Spit Q6H     aspirin  81 mg Oral or Feeding Tube Daily     levofloxacin  750 mg Intravenous Q48H     famotidine  20 mg Intravenous Q24H     influenza quadrivalent (PF) vacc age 3 yrs and older  0.5 mL Intramuscular Prior to discharge     pneumococcal vaccine  0.5 mL Intramuscular Prior to discharge     sodium chloride (PF)  3 mL Intravenous Q8H     multivitamins with minerals  15 mL Per Feeding Tube Daily     sodium chloride (PF)  3 mL Intracatheter Q8H       PRN Medications:  polyethylene glycol, senna-docusate, HOLD MEDICATION, lidocaine BUFFERED 1 %, ondansetron **OR** ondansetron, hydrALAZINE, sodium chloride (PF), - MEDICATION INSTRUCTIONS -, prochlorperazine **OR** prochlorperazine, naloxone, IV fluid REPLACEMENT ONLY, glucose **OR** dextrose **OR** glucagon, lidocaine (buffered or not  "buffered), lidocaine 4%, Reason beta blocker order not selected, insulin aspart, albuterol, albuterol, fentaNYL, potassium chloride, potassium chloride, potassium chloride, potassium chloride with lidocaine, potassium chloride, magnesium sulfate, magnesium sulfate, potassium phosphate (KPHOS) in D5W IV, potassium phosphate (KPHOS) in D5W IV, potassium phosphate (KPHOS) in D5W IV, potassium phosphate (KPHOS) in D5W IV, potassium phosphate (KPHOS) in D5W IV, diphenhydrAMINE **OR** diphenhydrAMINE    Infusions:    dexmedetomidine Stopped (10/18/17 1300)     - MEDICATION INSTRUCTIONS -       IV fluid REPLACEMENT ONLY       insulin (regular) 1 Units/hr (10/19/17 1800)     Reason beta blocker order not selected         Allergies   Allergen Reactions     Lisinopril Swelling     One-sided facial swelling after being on lisinopril/HCTZ for one week.        Physical Examination:  /76  Pulse 82  Temp 99.3  F (37.4  C) (Bladder)  Resp 21  Ht 1.727 m (5' 8\")  Wt 88.7 kg (195 lb 8.8 oz)  SpO2 100%  BMI 29.73 kg/m2  Intubated, on sedation. 2mm sluggish pupil - no corneal response on left - brisk response on right. Left oculocephalic absent. Not withdrawing with painful stimuli. Grimace to noxious stimuli, LT UL withdraw intermittentily.      Labs/Studies:  Recent Labs   Lab Test  10/13/17   1553  10/13/17   1406  10/13/17   1204   10/13/17   0357   10/12/17   0446   10/11/17   0312   NA  165*  167*  166*   < >  169*  169*   < >  167*  167*   < >  162*   POTASSIUM  4.1  4.2  4.5   < >  4.5   < >  4.2   --   3.8   CHLORIDE  133*  135*  134*   < >  136*   < >  134*  >125*   --   130*   CO2  27  27  26   < >  26   < >  24   --   24   ANIONGAP  5  6  6   < >  7   < >  9   --   8   GLC  155*  188*  176*   < >  183*   < >  209*   --   201*   BUN  79*  81*  82*   < >  81*   < >  74*   --   60*   CR  3.20*  3.22*  3.16*   < >  3.31*   < >  3.31*   --   3.35*   IZABELLA  7.9*  7.8*  7.7*   < >  7.8*   < >  7.8*   --   8.1*   WBC  "  --    --    --    --   15.7*   --   11.5*   --   13.4*   RBC   --    --    --    --   3.62*   --   4.08*   --   4.45   HGB   --    --    --    --   10.7*   --   12.0*   --   13.4   PLT   --    --    --    --   322   --   266   --   238    < > = values in this interval not displayed.       Recent Labs   Lab Test  10/08/17   0328  10/06/17   1322  10/06/17   1020  05/18/16   0435   INR  1.03  1.25*  1.23*  1.28*   PTT   --   32  32  35           Recent Labs  Lab 10/19/17  0354 10/18/17  0401 10/16/17  0529 10/15/17  0001   PH 7.49* 7.47* 7.47* 7.49*   PCO2 30* 32* 32* 33*   PO2 144* 100 93 98   HCO3 23 23 24 25   O2PER 40 40.0 60 60           Imaging:  Reviewed     Assessment/Plan  Mr. Cricket Miguel is a 62 year old gentleman w/ h/o AVR and ascending aortic repair in 5/2016, 1st degree AVB present, observing with serial EKGs. admitted with acute mental status changes found to have large moises-aortic fluid collection and multiple strokes likely septic emboli. The strokes involved the L cerebellar, L MCA, and R occipital regions. Small microhemorrhages were observed.  Concern for moises-infarct edema leading to expansion of the cerebellar infarct. Cerebral angiogram showed no evidence of mycotic aneurysm. NeuroICU and Neurosurgery following.  Hemodynamically stable off pressors.  Blood cultures growing MSSA now clearing intermittently.  Likely source of L knee septic arthritis which underwent arthroscopic irrigation and debridement 10/8.  On nafcillin/levaquin/gent - appreciate ID assistance.  JOSE present but improving.  Current plan for non-contrast CT abdomen early this week to evaluate prior signs of R renal pyelonephritis. Tagged WBC scan later this week.  Possible surgery as early as the week of Oct 23rd.  Attempting to lighten sedation and attempt extubation this week though he has been slow to awaken from sedation likely due to his inflammatory state in the setting of multiple strokes.  He is also increasingly  febrile over the last two days.  His antipyretic regimen was intensified today.  Plan for family meeting this week after the tagged WBC scan to discuss treatment options.    - Exam changes noted above  - ASA 81 mg   - off sedation to assess neurologic exam  - hypertonic saline off, Target -160 aiming towards 155  - Cerebral Angiogram normal  - VEEG can be DC  - Waiting for MRI Brain results.  - The patient from the neurology stand of point has good prognosis   - PET scan done showing 2 sites of infection in the the knee & the valvular lesion in the heart   - Family meeting was done & the family agreed with with proceeding with care & give a chance of extubation if tolerable, if not proceed with track & peg  - will continue to follow      Plan discussed with Dr. Brambila.      Lukasz Baker  Neurocritical care Fellow  Pager [phone]

## 2017-10-21 ENCOUNTER — APPOINTMENT (OUTPATIENT)
Dept: CT IMAGING | Facility: CLINIC | Age: 62
DRG: 004 | End: 2017-10-21
Attending: PSYCHIATRY & NEUROLOGY
Payer: COMMERCIAL

## 2017-10-21 ENCOUNTER — APPOINTMENT (OUTPATIENT)
Dept: GENERAL RADIOLOGY | Facility: CLINIC | Age: 62
DRG: 004 | End: 2017-10-21
Attending: PSYCHIATRY & NEUROLOGY
Payer: COMMERCIAL

## 2017-10-21 LAB
ALT SERPL W P-5'-P-CCNC: 35 U/L (ref 0–70)
ANION GAP SERPL CALCULATED.3IONS-SCNC: 6 MMOL/L (ref 3–14)
ANION GAP SERPL CALCULATED.3IONS-SCNC: 7 MMOL/L (ref 3–14)
ANION GAP SERPL CALCULATED.3IONS-SCNC: 7 MMOL/L (ref 3–14)
AST SERPL W P-5'-P-CCNC: 52 U/L (ref 0–45)
BACTERIA SPEC CULT: ABNORMAL
BACTERIA SPEC CULT: ABNORMAL
BACTERIA SPEC CULT: NO GROWTH
BACTERIA SPEC CULT: NO GROWTH
BASE DEFICIT BLDA-SCNC: 1.7 MMOL/L
BUN SERPL-MCNC: 46 MG/DL (ref 7–30)
BUN SERPL-MCNC: 50 MG/DL (ref 7–30)
BUN SERPL-MCNC: 55 MG/DL (ref 7–30)
CA-I BLD-MCNC: 4.5 MG/DL (ref 4.4–5.2)
CALCIUM SERPL-MCNC: 7.2 MG/DL (ref 8.5–10.1)
CALCIUM SERPL-MCNC: 7.2 MG/DL (ref 8.5–10.1)
CALCIUM SERPL-MCNC: 7.3 MG/DL (ref 8.5–10.1)
CHLORIDE SERPL-SCNC: 119 MMOL/L (ref 94–109)
CHLORIDE SERPL-SCNC: 122 MMOL/L (ref 94–109)
CHLORIDE SERPL-SCNC: 123 MMOL/L (ref 94–109)
CO2 SERPL-SCNC: 22 MMOL/L (ref 20–32)
CO2 SERPL-SCNC: 23 MMOL/L (ref 20–32)
CO2 SERPL-SCNC: 23 MMOL/L (ref 20–32)
CREAT SERPL-MCNC: 1.89 MG/DL (ref 0.66–1.25)
CREAT SERPL-MCNC: 1.94 MG/DL (ref 0.66–1.25)
CREAT SERPL-MCNC: 2.08 MG/DL (ref 0.66–1.25)
CRP SERPL-MCNC: 44 MG/L (ref 0–8)
ERYTHROCYTE [DISTWIDTH] IN BLOOD BY AUTOMATED COUNT: 18.3 % (ref 10–15)
ERYTHROCYTE [SEDIMENTATION RATE] IN BLOOD BY WESTERGREN METHOD: 108 MM/H (ref 0–20)
GENTAMICIN SERPL-MCNC: 1.5 MG/L
GENTAMICIN SERPL-MCNC: 2.6 MG/L
GFR SERPL CREATININE-BSD FRML MDRD: 32 ML/MIN/1.7M2
GFR SERPL CREATININE-BSD FRML MDRD: 35 ML/MIN/1.7M2
GFR SERPL CREATININE-BSD FRML MDRD: 36 ML/MIN/1.7M2
GLUCOSE BLDC GLUCOMTR-MCNC: 123 MG/DL (ref 70–99)
GLUCOSE BLDC GLUCOMTR-MCNC: 132 MG/DL (ref 70–99)
GLUCOSE BLDC GLUCOMTR-MCNC: 133 MG/DL (ref 70–99)
GLUCOSE BLDC GLUCOMTR-MCNC: 141 MG/DL (ref 70–99)
GLUCOSE BLDC GLUCOMTR-MCNC: 144 MG/DL (ref 70–99)
GLUCOSE BLDC GLUCOMTR-MCNC: 160 MG/DL (ref 70–99)
GLUCOSE BLDC GLUCOMTR-MCNC: 162 MG/DL (ref 70–99)
GLUCOSE SERPL-MCNC: 151 MG/DL (ref 70–99)
GLUCOSE SERPL-MCNC: 157 MG/DL (ref 70–99)
GLUCOSE SERPL-MCNC: 164 MG/DL (ref 70–99)
HCO3 BLD-SCNC: 23 MMOL/L (ref 21–28)
HCT VFR BLD AUTO: 23 % (ref 40–53)
HGB BLD-MCNC: 7.3 G/DL (ref 13.3–17.7)
Lab: ABNORMAL
MCH RBC QN AUTO: 30.2 PG (ref 26.5–33)
MCHC RBC AUTO-ENTMCNC: 31.7 G/DL (ref 31.5–36.5)
MCV RBC AUTO: 95 FL (ref 78–100)
O2/TOTAL GAS SETTING VFR VENT: 40 %
OSMOLALITY SERPL: 321 MMOL/KG (ref 280–301)
OSMOLALITY SERPL: 331 MMOL/KG (ref 280–301)
OSMOLALITY SERPL: 331 MMOL/KG (ref 280–301)
PCO2 BLD: 37 MM HG (ref 35–45)
PH BLD: 7.41 PH (ref 7.35–7.45)
PLATELET # BLD AUTO: 344 10E9/L (ref 150–450)
PO2 BLD: 140 MM HG (ref 80–105)
POTASSIUM SERPL-SCNC: 3.6 MMOL/L (ref 3.4–5.3)
POTASSIUM SERPL-SCNC: 3.7 MMOL/L (ref 3.4–5.3)
POTASSIUM SERPL-SCNC: 3.7 MMOL/L (ref 3.4–5.3)
RBC # BLD AUTO: 2.42 10E12/L (ref 4.4–5.9)
SODIUM SERPL-SCNC: 149 MMOL/L (ref 133–144)
SODIUM SERPL-SCNC: 152 MMOL/L (ref 133–144)
SODIUM SERPL-SCNC: 152 MMOL/L (ref 133–144)
SPECIMEN SOURCE: ABNORMAL
SPECIMEN SOURCE: NORMAL
SPECIMEN SOURCE: NORMAL
WBC # BLD AUTO: 10.9 10E9/L (ref 4–11)

## 2017-10-21 PROCEDURE — 25000132 ZZH RX MED GY IP 250 OP 250 PS 637: Performed by: STUDENT IN AN ORGANIZED HEALTH CARE EDUCATION/TRAINING PROGRAM

## 2017-10-21 PROCEDURE — 40000986 XR CHEST PORT 1 VW

## 2017-10-21 PROCEDURE — 40000275 ZZH STATISTIC RCP TIME EA 10 MIN

## 2017-10-21 PROCEDURE — 80170 ASSAY OF GENTAMICIN: CPT | Performed by: THORACIC SURGERY (CARDIOTHORACIC VASCULAR SURGERY)

## 2017-10-21 PROCEDURE — 94003 VENT MGMT INPAT SUBQ DAY: CPT

## 2017-10-21 PROCEDURE — 85652 RBC SED RATE AUTOMATED: CPT | Performed by: THORACIC SURGERY (CARDIOTHORACIC VASCULAR SURGERY)

## 2017-10-21 PROCEDURE — 27210913 ZZH NUTRITION PRODUCT INTERMEDIATE PACKET

## 2017-10-21 PROCEDURE — 25000128 H RX IP 250 OP 636: Performed by: STUDENT IN AN ORGANIZED HEALTH CARE EDUCATION/TRAINING PROGRAM

## 2017-10-21 PROCEDURE — 25000131 ZZH RX MED GY IP 250 OP 636 PS 637: Performed by: STUDENT IN AN ORGANIZED HEALTH CARE EDUCATION/TRAINING PROGRAM

## 2017-10-21 PROCEDURE — 25000132 ZZH RX MED GY IP 250 OP 250 PS 637: Performed by: THORACIC SURGERY (CARDIOTHORACIC VASCULAR SURGERY)

## 2017-10-21 PROCEDURE — 36415 COLL VENOUS BLD VENIPUNCTURE: CPT | Performed by: STUDENT IN AN ORGANIZED HEALTH CARE EDUCATION/TRAINING PROGRAM

## 2017-10-21 PROCEDURE — 83930 ASSAY OF BLOOD OSMOLALITY: CPT | Performed by: STUDENT IN AN ORGANIZED HEALTH CARE EDUCATION/TRAINING PROGRAM

## 2017-10-21 PROCEDURE — 20000004 ZZH R&B ICU UMMC

## 2017-10-21 PROCEDURE — 87040 BLOOD CULTURE FOR BACTERIA: CPT | Performed by: STUDENT IN AN ORGANIZED HEALTH CARE EDUCATION/TRAINING PROGRAM

## 2017-10-21 PROCEDURE — 25000132 ZZH RX MED GY IP 250 OP 250 PS 637: Performed by: SURGERY

## 2017-10-21 PROCEDURE — 99291 CRITICAL CARE FIRST HOUR: CPT | Mod: GC | Performed by: ANESTHESIOLOGY

## 2017-10-21 PROCEDURE — 70490 CT SOFT TISSUE NECK W/O DYE: CPT

## 2017-10-21 PROCEDURE — 40000014 ZZH STATISTIC ARTERIAL MONITORING DAILY

## 2017-10-21 PROCEDURE — 40000281 ZZH STATISTIC TRANSPORT TIME EA 15 MIN

## 2017-10-21 PROCEDURE — 82330 ASSAY OF CALCIUM: CPT | Performed by: ORTHOPAEDIC SURGERY

## 2017-10-21 PROCEDURE — 25000132 ZZH RX MED GY IP 250 OP 250 PS 637: Performed by: PHYSICIAN ASSISTANT

## 2017-10-21 PROCEDURE — 25000128 H RX IP 250 OP 636: Performed by: THORACIC SURGERY (CARDIOTHORACIC VASCULAR SURGERY)

## 2017-10-21 PROCEDURE — S0032 INJECTION, NAFCILLIN SODIUM: HCPCS | Performed by: STUDENT IN AN ORGANIZED HEALTH CARE EDUCATION/TRAINING PROGRAM

## 2017-10-21 PROCEDURE — 25000128 H RX IP 250 OP 636

## 2017-10-21 PROCEDURE — 84450 TRANSFERASE (AST) (SGOT): CPT | Performed by: ORTHOPAEDIC SURGERY

## 2017-10-21 PROCEDURE — 86140 C-REACTIVE PROTEIN: CPT | Performed by: ORTHOPAEDIC SURGERY

## 2017-10-21 PROCEDURE — 84460 ALANINE AMINO (ALT) (SGPT): CPT | Performed by: ORTHOPAEDIC SURGERY

## 2017-10-21 PROCEDURE — 83930 ASSAY OF BLOOD OSMOLALITY: CPT | Performed by: ORTHOPAEDIC SURGERY

## 2017-10-21 PROCEDURE — 71010 XR CHEST PORT 1 VW: CPT

## 2017-10-21 PROCEDURE — 36569 INSJ PICC 5 YR+ W/O IMAGING: CPT

## 2017-10-21 PROCEDURE — 80048 BASIC METABOLIC PNL TOTAL CA: CPT | Performed by: ORTHOPAEDIC SURGERY

## 2017-10-21 PROCEDURE — 00000146 ZZHCL STATISTIC GLUCOSE BY METER IP

## 2017-10-21 PROCEDURE — 94150 VITAL CAPACITY TEST: CPT

## 2017-10-21 PROCEDURE — 25000125 ZZHC RX 250: Performed by: STUDENT IN AN ORGANIZED HEALTH CARE EDUCATION/TRAINING PROGRAM

## 2017-10-21 PROCEDURE — 80048 BASIC METABOLIC PNL TOTAL CA: CPT | Performed by: STUDENT IN AN ORGANIZED HEALTH CARE EDUCATION/TRAINING PROGRAM

## 2017-10-21 PROCEDURE — 85027 COMPLETE CBC AUTOMATED: CPT | Performed by: THORACIC SURGERY (CARDIOTHORACIC VASCULAR SURGERY)

## 2017-10-21 PROCEDURE — 80170 ASSAY OF GENTAMICIN: CPT | Performed by: STUDENT IN AN ORGANIZED HEALTH CARE EDUCATION/TRAINING PROGRAM

## 2017-10-21 PROCEDURE — 82803 BLOOD GASES ANY COMBINATION: CPT | Performed by: ORTHOPAEDIC SURGERY

## 2017-10-21 RX ORDER — SODIUM CHLORIDE 9 MG/ML
INJECTION, SOLUTION INTRAVENOUS
Status: COMPLETED
Start: 2017-10-21 | End: 2017-10-21

## 2017-10-21 RX ADMIN — NAFCILLIN SODIUM: 2 INJECTION, POWDER, LYOPHILIZED, FOR SOLUTION INTRAMUSCULAR; INTRAVENOUS at 04:00

## 2017-10-21 RX ADMIN — FENTANYL CITRATE 50 MCG: 50 INJECTION, SOLUTION INTRAMUSCULAR; INTRAVENOUS at 21:43

## 2017-10-21 RX ADMIN — INSULIN ASPART 1 UNITS: 100 INJECTION, SOLUTION INTRAVENOUS; SUBCUTANEOUS at 16:06

## 2017-10-21 RX ADMIN — NYSTATIN 500000 UNITS: 100000 SUSPENSION ORAL at 08:30

## 2017-10-21 RX ADMIN — NAFCILLIN SODIUM: 2 INJECTION, POWDER, LYOPHILIZED, FOR SOLUTION INTRAMUSCULAR; INTRAVENOUS at 16:01

## 2017-10-21 RX ADMIN — INSULIN ASPART 1 UNITS: 100 INJECTION, SOLUTION INTRAVENOUS; SUBCUTANEOUS at 03:38

## 2017-10-21 RX ADMIN — NAFCILLIN SODIUM: 2 INJECTION, POWDER, LYOPHILIZED, FOR SOLUTION INTRAMUSCULAR; INTRAVENOUS at 13:00

## 2017-10-21 RX ADMIN — ACETAMINOPHEN 975 MG: 325 SOLUTION ORAL at 16:02

## 2017-10-21 RX ADMIN — NYSTATIN 500000 UNITS: 100000 SUSPENSION ORAL at 01:48

## 2017-10-21 RX ADMIN — ACETAMINOPHEN 1000 MG: 160 SOLUTION ORAL at 01:48

## 2017-10-21 RX ADMIN — INSULIN ASPART 1 UNITS: 100 INJECTION, SOLUTION INTRAVENOUS; SUBCUTANEOUS at 08:56

## 2017-10-21 RX ADMIN — NYSTATIN 500000 UNITS: 100000 SUSPENSION ORAL at 13:34

## 2017-10-21 RX ADMIN — MULTIVITAMIN 15 ML: LIQUID ORAL at 08:30

## 2017-10-21 RX ADMIN — Medication 300 MG: at 20:08

## 2017-10-21 RX ADMIN — LIDOCAINE HYDROCHLORIDE 4 ML: 10 INJECTION, SOLUTION INFILTRATION; PERINEURAL at 13:23

## 2017-10-21 RX ADMIN — Medication 1 PACKET: at 09:36

## 2017-10-21 RX ADMIN — POTASSIUM CHLORIDE 20 MEQ: 1.5 POWDER, FOR SOLUTION ORAL at 07:07

## 2017-10-21 RX ADMIN — METOPROLOL TARTRATE 12.5 MG: 100 TABLET, FILM COATED ORAL at 20:09

## 2017-10-21 RX ADMIN — INSULIN ASPART 1 UNITS: 100 INJECTION, SOLUTION INTRAVENOUS; SUBCUTANEOUS at 00:23

## 2017-10-21 RX ADMIN — ASPIRIN 81 MG CHEWABLE TABLET 81 MG: 81 TABLET CHEWABLE at 08:30

## 2017-10-21 RX ADMIN — POTASSIUM CHLORIDE 20 MEQ: 1.5 POWDER, FOR SOLUTION ORAL at 00:41

## 2017-10-21 RX ADMIN — METOPROLOL TARTRATE 12.5 MG: 100 TABLET, FILM COATED ORAL at 08:32

## 2017-10-21 RX ADMIN — FAMOTIDINE 20 MG: 40 POWDER, FOR SUSPENSION ORAL at 08:45

## 2017-10-21 RX ADMIN — NAFCILLIN SODIUM: 2 INJECTION, POWDER, LYOPHILIZED, FOR SOLUTION INTRAMUSCULAR; INTRAVENOUS at 08:31

## 2017-10-21 RX ADMIN — Medication 300 MG: at 08:32

## 2017-10-21 RX ADMIN — NAFCILLIN SODIUM: 2 INJECTION, POWDER, LYOPHILIZED, FOR SOLUTION INTRAMUSCULAR; INTRAVENOUS at 00:21

## 2017-10-21 RX ADMIN — ACETAMINOPHEN 975 MG: 325 SOLUTION ORAL at 09:36

## 2017-10-21 RX ADMIN — NYSTATIN 500000 UNITS: 100000 SUSPENSION ORAL at 20:08

## 2017-10-21 RX ADMIN — NAFCILLIN SODIUM: 2 INJECTION, POWDER, LYOPHILIZED, FOR SOLUTION INTRAMUSCULAR; INTRAVENOUS at 20:08

## 2017-10-21 RX ADMIN — SODIUM CHLORIDE 500 ML: 9 INJECTION, SOLUTION INTRAVENOUS at 01:52

## 2017-10-21 RX ADMIN — GENTAMICIN SULFATE 80 MG: 80 INJECTION, SOLUTION INTRAVENOUS at 09:36

## 2017-10-21 NOTE — PROGRESS NOTES
PICC PLACED ON FIRST ATTEMPT WITH ULTRASOUND GUIDANCE VIA THE BASILIC VENOUS SYTEM. LINE THREADED WITH EASE AND POSITIVE DARK RED BLOOD RETURN OBSERVED X3 LUMENS. NAVIGATION SYSTEM INDICATED LINE TOWARDS SVC.  FLUSHED AND DRESSED CATHETER PER PROTOCOL. NO OBSERVABLE SIGNS OR SX OF COMPLICATIONS POST INSERTION.    GUILLE Slade

## 2017-10-21 NOTE — PROGRESS NOTES
Status  D/I: Patient on unit 4A Surgical/Neuro ICU s/p large moises-aortic fluid collection and multiple strokes likely septic emboli.    Neuro- withdraws on LLE only, opens eyes to voice, pupils equal and reactive,  dex @ 0.2 mcg/kg/min stopped throughout the day, but restarted with restlessness at night.  CV-  SR, BP and HR increasing by end of night, precedex restarted, scheduled tylenol  Pulm- PS 7/5, RR 15-20, lungs clear to coarse- diminished bases, small amount of white secretions from ETT  GI-  TF @ 50 (goal), Na 151- decreased free water from 125 to 75/hr, rectal tube- watery stool   - Espinoza, UOP > 150 cc/hr   Skin-  no new issues    Social-  Family at bedside and updated-  Multiple updates throughout the day for many, many questions -MDs and multiple teams updating family.  See flow sheets for further interventions and assessments.  P: Continue to monitor pt closely, Notify MD of changes/concerns,  Possible trial exutbation on Saturday, possible procedures to correct infection in his heart next week

## 2017-10-21 NOTE — PROGRESS NOTES
CVTS PROGRESS NOTE  10/21/2017  Attending: Ramesh Kuo, *    S:   No acute issues overnight. Continuing with pressure support on vent. Was seen by dentistry for the possible tooth infection.     O:   Vitals:    10/21/17 0700 10/21/17 0800 10/21/17 0806 10/21/17 0900   BP:       BP Location:       Pulse:       Resp: 18 16  15   Temp:  99.1  F (37.3  C)     TempSrc:  Bladder     SpO2: 100% 98% 99% 99%   Weight:       Height:         Vitals:    10/19/17 0400 10/20/17 0400 10/21/17 0400   Weight: 88.7 kg (195 lb 8.8 oz) 93.2 kg (205 lb 7.5 oz) 96.8 kg (213 lb 6.5 oz)         Intake/Output Summary (Last 24 hours) at 10/21/17 1024  Last data filed at 10/21/17 0900   Gross per 24 hour   Intake          4447.18 ml   Output             3250 ml   Net          1197.18 ml             Ventilation Mode: CPAP/PS  FiO2 (%): 40 %  PEEP (cm H2O): 5 cmH2O  Pressure Support (cm H2O): 7 cmH2O  Oxygen Concentration (%): 40 %  Resp: 15    Recent Labs  Lab 10/21/17  0335 10/20/17  0408 10/19/17  0354 10/18/17  0401   PH 7.41 7.40 7.49* 7.47*   PCO2 37 38 30* 32*   PO2 140* 94 144* 100   HCO3 23 24 23 23   O2PER 40 40 40 40.0       MAPs:   General: Intubated, lightly sedated, does not follow commands but tracking and trying to mouth words.opens eyes spontaneously.   Neck: Supple, no tracheal deviation, mild left jaw swelling  Cardiovascular: RRR  Pumonary: Non labored breathing on MV  Abdomen: Soft, non distended, non tender  Ext: Minimal edema, warm  Neuro: Does not follow commands      Labs:  [unfilled]  GLUCOSE:     Recent Labs  Lab 10/21/17  0855 10/21/17  0830 10/21/17  0335 10/21/17  0334 10/21/17  0022 10/20/17  2225 10/20/17  1950 10/20/17  1706 10/20/17  1703  10/20/17  0412  10/19/17  2207  10/19/17  1608   GLC  --   --  164*  --   --  187*  --   --  139*  --  135*  --  130*  --  128*   * 132*  --  144* 162*  --  131* 132*  --   < > 119*  < > 120*  < > 125*   < > = values in this interval not  displayed.      Imaging:  No recent imaging      A/P:   Cricket Miguel is a 62 year old with history of AVR and ascending aortic repair in May 2016 presented on 10/6 with acute mental status changes found to have large moises-aortic fluid collection and multiple strokes likely septic emboli.    Neuro: Tylenol scheduled for fevers,continue neuro checks Q2H,  precedex and wake up as able. Okay to use ibuprofen for fevers if needed per neuro.   Resp: Inability to protect airway requiring mechanical ventilation, on minimal vent settings, did well with pressure support. Will continue to do trials today. Will obtain a NIF and VC again today.   CV: Goal normotension per neurology/neurosurgery. ASA. Low dose metoprolol BID for intermittent SVT  GI/FEN: Tube feeds to goal, continue free water for hypernatremia (goal 145-155, go slow per neuro), will recheck BMPs Q6. +Bowel function  Renal: JOSE non-oliguric, clinically euvolemic   Endo: Insulin gtt discontinued, started on SSI  ID: Continue current antibiotic regimen pending ID recommendations (Nafcillin, levofloxacin, gentamycin), daily blood cultures negative since 10/13. Added rifampin to abx per ID. Stopped daily cultures since they have been negative. WBC slowly decreasing. PET scan done showed increased uptake around the heart and knee. OMFS consulted for tooth infection as per Dentistry.   Heme: No bleeding concerns at this time, hold anticoagulation per neuro and neurosurgery  Prophylaxis: H2 blocker, no chemical dvt ppx due to evolving stroke  Dispo: CVICU      Discussed with CVTS fellow.   Staff surgeons have been informed of changes through both  verbal and written communication.        Rula Contreras  General Surgery PGY 4  Cardiothoracic Surgery  [phone]

## 2017-10-21 NOTE — PLAN OF CARE
Problem: Patient Care Overview  Goal: Plan of Care/Patient Progress Review  Outcome: No Change  Status  D/I: Patient on unit 4A Surgical/Neuro ICU admit 10/6 large moises-aortic fluid collection and multiple strokes likely septic emboli.    Neuro- withdraws on LLE only, pupils 3 mm equal and reactive, opens eyes to voice, does not follow commands, patient calm overnight, on precedex lowest dose planning to stop at 0600 for possible extubation today  CV- sinus rhythm, HR 80-90, -120/50-60, scheduled tylenol   Pulm- PS 7/5 all night, clear/white secretions   GI- TF @ 50 (goal), free water flush 75 ml/hr, rectal tube w/ watery brown stool   - garza with 200-300 ml/hr   Skin- no new issues  Electrolytes- 20 mEq given for K+ 3.4, verified current sodium level/drop with neurology resident   Social-  Family at bedside and updated.  See flow sheets for further interventions and assessments.  P: Continue to monitor pt closely, Notify MD of changes/concerns

## 2017-10-21 NOTE — PROGRESS NOTES
BLUE [facility] ID SERVICE: ONGOING CONSULTATION   Cricket Miguel : 1955 Sex: male:   Medical record number [id number] Attending Physician: Ramesh Kuo, *  Date of Service: 2017    1. 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 Left knee pain; concern for septic arthritis; recent steroid injection; cultures 10/6 - MSSA+, 10/8 - no growth    3. Multifocal acute infarctions involving the left parietotemporal lobes, left cerebellar hemisphere, and right occipital lobe  4. Ongoing critical illness with mechanical ventilation  5. Leukocytosis - improving   6. Anemia  7. Acute on CKD - improving       RECOMMENDATIONS:   1. Continue Nafcillin 2g IV Q4 hours and Levofloxacin 750mg IV Q48 hours (better CNS penetration)  2. Continue Gentamicin for synergy; pharmacy to assist in dosing and tracking levels; treat for 14 days total (through 10/20/17); if surgery is planned around this time, could extend treatment through the surgery. Rifampin added on 10/19 after repeated negative blood cx   3. Daily blood cultures to demonstrate clearance  4. Monitor renal function closely given use of gentamicin    ID will continue to follow. Plan from ID standpoint discussed with his daughter and RN   Please call ID on call physician this weekend if you have any questions     Sharon Rice MD, M.Med.Sc.  Pager: [phone]     SUBJECTIVE: (Recommend ? 4 systems)   Intubated /sedated    ROS:(Recommend ? 2systems)   A five-point review of systems was obtained and was negative with the exception of that which is described above.  Allergies   Allergen Reactions     Lisinopril Swelling     One-sided facial swelling after being on lisinopril/HCTZ for one week.      CURRENT ANTI-INFECTIVES:   Nafcillin  "  Gentamicin   Levofloxacin   Rifampin added 10/19       EXAMINATION: (Recommend ? 5 systems)   Vital Signs: /64  Pulse 82  Temp 99.4  F (37.4  C) (Axillary)  Resp 12  Ht 1.727 m (5' 8\")  Wt 93.2 kg (205 lb 7.5 oz)  SpO2 100%  BMI 31.24 kg/m2   GENERAL:  Sedated, intubated , opens eyes   EYES:  Pale, pupils react to light sluggishly  ENT:  Intubated   NECK:  Supple. No  Cervical lymphadenopathy  LUNGS: decreased breath sounds in bases  CARDIOVASCULAR:  Regular rate and rhythm with no murmurs, gallops or rubs.  ABDOMEN:  Normal bowel sounds, soft, nontender. No appreciable hepatosplenomegaly  SKIN:  No acute rashes.    NEUROLOGIC:  Sedated  Espinoza  Rectal tube     NEW DATA/RESULTS:   Culture Micro   Date Value Ref Range Status   10/20/2017 No growth after 9 hours  Preliminary   10/20/2017 No growth after 9 hours  Preliminary   10/19/2017 No growth after 23 hours  Preliminary   10/19/2017 No growth after 1 day  Preliminary   10/18/2017 No growth after 2 days  Preliminary       Recent Labs   Lab Test  10/16/17   0323  10/09/17   0316  10/07/17   1333  10/06/17   1859  05/06/13   1703  02/02/11   1506   CRP  48.0*  240.0*  290.0*  310.0*  <5.0  9.6*     Recent Labs   Lab Test  10/20/17   0412  10/19/17   0354  10/18/17   0401  10/17/17   0441  10/16/17   0323  10/15/17   0209   WBC  13.5*  18.6*  14.7*  15.8*  18.3*  24.4*     Recent Labs   Lab Test  10/20/17   1703  10/20/17   0412  10/19/17   2207  10/19/17   1608   CR  2.04*  2.08*  2.09*  2.16*   GFRESTIMATED  33*  32*  32*  31*     Hematology Studies  Recent Labs   Lab Test  10/20/17   0412  10/19/17   0354  10/19/17   0027  10/18/17   0401  10/17/17   0441  10/16/17   0323   10/15/17   0209   10/06/17   1020   05/06/13   1703  12/04/12   1016  02/02/11   1506   11/06/09   1155  11/02/09   1717   WBC  13.5*  18.6*   --   14.7*  15.8*  18.3*   --   24.4*   < >  17.1*   < >  10.6  11.9*  11.9*   < >  9.2  12.1*   ANEU   --    --    --    --    --    --    " --    --    --   15.7*   --   7.2  7.9  8.2   --   5.9  8.3   AEOS   --    --    --    --    --    --    --    --    --   0.0   --   0.0  0.4  0.3   --   0.3  0.3   HCT  24.4*  27.8*   --   26.2*  30.1*  32.5*   --   35.8*   < >  43.8   < >  46.2  47.4  46.8   < >  47.1  45.9   PLT  334  351  330  297  287  309   < >  316   < >  185   < >  315  259  315   < >  303  312    < > = values in this interval not displayed.     Metabolic  Recent Labs   Lab Test  10/20/17   1703  10/20/17   0412  10/19/17   2207   NA  151*  158*  156*   BUN  59*  60*  59*   CO2  24  23  24   CR  2.04*  2.08*  2.09*   GFRESTIMATED  33*  32*  32*     Hepatic Studies  Recent Labs   Lab Test  10/06/17   1020  06/03/16   1716  04/26/16   0654   BILITOTAL  1.5*  0.3  0.6   ALKPHOS  85  104  91   ALBUMIN  2.8*  3.4  3.1*   AST  24  19  20   ALT  33  32  26     Immunologlobulins  Recent Labs   Lab Test  05/06/13   1703  02/02/11   1506   SED  6  8      PET Oncology Whole body 10/19/17    IMPRESSION:   1. Multifocal hypermetabolic areas in the ascending aortic graft with  associated periaortic fluid collection which has a hypermetabolic rim.  These findings are concerning for aortic graft infection.  2. 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 This may  represent spread of infection from the aortic root to the pulmonary  artery.   2a. Right ventricular wall FDG uptake which is abnormal.  The  differential includes right heart strain, pulmonary hypertension,  myocarditis. Concerning for infection given its contiguous with the  pulmonary artery uptake.   3. Small FDG avid left knee joint effusion, likely infectious.

## 2017-10-21 NOTE — PROGRESS NOTES
CV ICU Progress Note    POD: 13 Days Post-Op  LOS: 15    Admission History: Mr. Cricket Miguel is a 62 year old gentleman w/ h/o AVR and ascending aortic repair in 2016 admitted with acute mental status changes found to have large moises-aortic fluid collection and multiple strokes likely septic emboli.    S/Interval History: No acute events overnight. Patient mental status mostly stable may be improving slightly.    O:    Temp: 99.3  F (37.4  C) Temp  Min: 99  F (37.2  C)  Max: 99.9  F (37.7  C)  Resp: 18 Resp  Min: 11  Max: 22  SpO2: 100 % SpO2  Min: 99 %  Max: 100 %    No Data Recorded  Heart Rate: 90 Heart Rate  Min: 74  Max: 103  BP: 103/64 Systolic (24hrs), Av , Min:103 , Max:103   Diastolic (24hrs), Av, Min:64, Max:64    Ventilation Mode: CPAP/PS  FiO2 (%): 40 %  PEEP (cm H2O): 5 cmH2O  Pressure Support (cm H2O): 7 cmH2O  Oxygen Concentration (%): 40 %  Resp: 18  Date 10/16/17 0700 - 10/17/17 0659   Shift 1236-2387 2085-4321 8453-0560 24 Hour Total   I  N  T  A  K  E   I.V. 114.6   114.6    NG/   400    Enteral 240   240    Shift Total  (mL/kg) 754.6  (8.34)   754.6  (8.34)   O  U  T  P  U  T   Urine 575   575    Shift Total  (mL/kg) 575  (6.35)   575  (6.35)   Weight (kg) 90.5 90.5 90.5 90.5         Past Medical History:   Diagnosis Date     AI (aortic insufficiency)      Aortic root dilatation (H)      AR (aortic regurgitation)     severe     Cardiomyopathy (H)      CHF (congestive heart failure), NYHA class II (H)      COPD, mild (H)     spirometry      Heart murmur      Hyperlipidemia LDL goal <70 10/31/2010     Hypertension goal BP (blood pressure) < 140/90      Inguinal hernia      left lung nodule  2008     Major depressive disorder, single episode, moderate (H)      Psoriasis childhood     Tobacco use disorder        Past Surgical History:   Procedure Laterality Date     ARTHROSCOPY KNEE INCISION AND DRAINAGE Left 10/8/2017    Procedure: ARTHROSCOPY KNEE INCISION AND DRAINAGE;   Arthroscopic Incision and Drainage of Left Knee;  Surgeon: Lucretia Munoz MD;  Location: [facility] OR     HC SHOULDER ARTHROSCOPY, DX  2001    Arthroscopy, Shoulder RT Dr OSIRIS Andersen     HC SHOULDER ARTHROSCOPY, DX  1/04    LT arthroscopy Dr OISRIS Andersen     HERNIA REPAIR, UMBILICAL  1/08    incarcerated - dr rockwell     HERNIORRHAPHY INGUINAL  12/21/2012    HERNIORRHAPHY INGUINAL;  Right Inguinal Hernia Repair with mesh ;  Surgeon: Mello Rockwell MD;  Location: [facility] OR     REPLACE VALVE AORTIC N/A 5/17/2016    REPLACE VALVE AORTIC - Dr Bowers     ROTATOR CUFF REPAIR RT/LT  11/10    Rt arthroscopy - Dr OSIRIS Andersen     SURGICAL HISTORY OF -   5/16    AVR, severe AR, aortic root repair       Physical Exam    General: Intubated, lightly sedated, in no distress   Neck: Supple, no tracheal deviation  Cardiovascular: Regular tachy, nom/r/g  Pumonary: Non labored breathing on MV.  CTAB   Abdomen: Soft, non distended, non tender.   Ext: W/o edema, wwp  Neuro: Small sluggishly reactive pupil bilaterally approximately 2mm - Left oculocephalic & LT Corneal abscent with mild intermittent nystagmus to the RT side     Lab Results   Component Value Date    WBC 10.9 10/21/2017    HGB 7.3 (L) 10/21/2017    HCT 23.0 (L) 10/21/2017     10/21/2017     (H) 10/21/2017    POTASSIUM 3.7 10/21/2017    CHLORIDE 123 (H) 10/21/2017    CO2 23 10/21/2017    BUN 55 (H) 10/21/2017    CR 2.08 (H) 10/21/2017     (H) 10/21/2017     (H) 10/21/2017    DD 1.7 (H) 04/25/2016    NTBNPI 2768 (H) 04/25/2016    NTBNP 1302 (H) 06/03/2016    TROPONIN <0.07 12/05/2005    TROPI 0.634 (HH) 10/08/2017    AST 52 (H) 10/21/2017    ALT 35 10/21/2017    ALKPHOS 85 10/06/2017    BILITOTAL 1.5 (H) 10/06/2017    INR 1.03 10/08/2017           dexmedetomidine 0.1 mcg/kg/hr (10/21/17 0700)     - MEDICATION INSTRUCTIONS -       IV fluid REPLACEMENT ONLY       Reason beta blocker order not selected           Assessment and Plan: Mr. Cricket Miguel is  a 62 year old gentleman w/ h/o AVR and ascending aortic repair in 5/2016 admitted with acute mental status changes found to have large moises-aortic fluid collection and multiple strokes likely septic emboli.  Concern for moises-infarct edema leading to expansion of the cerebellar infarct.    Neuro:     Multifocal CVA: L cerebellar, L MCA, and R occipital regions.  Concern for septic emboli.  Small microhemorrhages were observed.  Progressive edema noted.  Neurosurgery and neurocritical care are following.  Angio showed no evidence of mycotic aneurysms.  Neuro exam stable.    ASA 81mg    Acetaminophen, Ibuprofen, Fentanyl prn    Precedex gtt for anxiety on ventilator  Resp:     Intubated and mechanically ventilated.    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  1st degree AVB.  Plan for surgery no earlier than the week of Oct 23rd.     Tagged WBC scan showed likely infectious etiology.    Tachycardia    Metoprolol started 10/19  GI/FEN:     Post pyloric feeding tube in place, keep infusing at goal rate.  Renal:     Monitor electrolytes, Cr, and urine output     Lytes goals K>4 and Mag>2    JOSE:     Monitor Urine Output. Cr     Hypernatremia: Treated with hypertonic saline but not normalizing without free water.      Continue free water (100mL q1hour) with conservative Na reduction goals of 5 meq per 24hrs. Goal Na+ 145-155 per [facility]  Monitor Q2 Na.  Endo:    Glucose checks, SSI to serum glucose >80, <180 mg/dL  ID:     Sepsis: MSSA bacteremia with likely source of the L knee septic arthritis. Knee is s/p arthroscopic irrigation and debridement 10/8. Renal function is improving slowly.  Otherwise hemodynamically stable without pressors.      Nafcillin 2g IV Q4 hours and Levofloxacin 750mg IV Q48 hours, Rifampin 300mg BID. Gentamicin IV daily. Blood cultures continue to be negative since 10/12. Nystatin (oral  candidiasis)     PET scan 10/19 revealed likely infectious fluid around ascending aorta, plan to go to OR week of 10/23    Tooth Remnant, left lower canine    Dentistry recommends consult from [facility] for possible removal    Monitor WBC daily   Heme:     Hgb stable, no concerns at this time  Prophylaxis: SCDs, Famotidine  Lines:      CVC 14 days    Espinoza 14 days    A-line 14 days    Rectal Tube 2 days      Dispo: Continue ICU care. Continue to talk with family regarding plan   Code Status: Full Code    Chris Behrens M.D.  Cardiovascular Anesthesiology Fellow    Patient seen with Dr. Matute staff Intensivist

## 2017-10-21 NOTE — PROGRESS NOTES
Dental Service Consultation        Cricket Miguel MRN# [id number]   YOB: 1955 Age: 62 year old   Date of Admission: 10/6/2017     Reason for consult: I was asked by Rula Contreras to evaluate this patient for dental related infection.           Assessment and Plan:   Assessment: Pt has swelling on left side of neck, Inferior and posterior to the angle of the mandible in the area of the mid neck. Evaluation shows a remnant of a tooth in the area of #22, the lower left canine. This could be related to the swelling however there was no response on palpation. Swelling could also be related to soft tissue irritation from the intubation and related concerns.    Plan: Request consult by oral surgery for possible removal of root remnant to address the possible source of infection.             Chief Complaint:   Pt intubated. Girlfriend noted what appeared to be a swelling on the neck that appeared to grow larger throughout the day.    History is obtained from the patient and patient's significant other and nurse on staff         History of Present Illness:   This patient is a 62 year old male who presents with soft tissue swelling of the Left neck              Past Medical History:     Past Medical History:   Diagnosis Date     AI (aortic insufficiency)      Aortic root dilatation (H)      AR (aortic regurgitation)     severe     Cardiomyopathy (H)      CHF (congestive heart failure), NYHA class II (H)      COPD, mild (H)     spirometry 12/16     Heart murmur      Hyperlipidemia LDL goal <70 10/31/2010     Hypertension goal BP (blood pressure) < 140/90      Inguinal hernia      left lung nodule  1/29/2008     Major depressive disorder, single episode, moderate (H)      Psoriasis childhood     Tobacco use disorder              Past Surgical History:     Past Surgical History:   Procedure Laterality Date     ARTHROSCOPY KNEE INCISION AND DRAINAGE Left 10/8/2017    Procedure: ARTHROSCOPY KNEE INCISION AND  DRAINAGE;  Arthroscopic Incision and Drainage of Left Knee;  Surgeon: Lucretia Munoz MD;  Location: [facility] OR     HC SHOULDER ARTHROSCOPY, DX  2001    Arthroscopy, Shoulder RT Dr OSIRIS Andersen     HC SHOULDER ARTHROSCOPY, DX  1/04    LT arthroscopy Dr OSIRIS Andersen     HERNIA REPAIR, UMBILICAL  1/08    incarcerated - dr rockwell     HERNIORRHAPHY INGUINAL  12/21/2012    HERNIORRHAPHY INGUINAL;  Right Inguinal Hernia Repair with mesh ;  Surgeon: Mello Rockwell MD;  Location: [facility] OR     REPLACE VALVE AORTIC N/A 5/17/2016    REPLACE VALVE AORTIC - Dr Bowers     ROTATOR CUFF REPAIR RT/LT  11/10    Rt arthroscopy - Dr OSIRIS Andersen     SURGICAL HISTORY OF -   5/16    AVR, severe AR, aortic root repair               Social History:     Social History   Substance Use Topics     Smoking status: Former Smoker     Packs/day: 1.00     Years: 35.00     Quit date: 4/1/2016     Smokeless tobacco: Never Used      Comment: 4/2016     Alcohol use 0.0 oz/week     0 Standard drinks or equivalent per week      Comment: 1 drink per week avg, seldom             Family History:     Family History   Problem Relation Age of Onset     Genetic Disorder Mother      Bone plateover growth Agustin. shoulder surgeries     CANCER Mother      brain cancer     Alzheimer Disease Father              Immunizations:     Immunization History   Administered Date(s) Administered     TD (ADULT, 7+) 04/14/1999             Allergies:     Allergies   Allergen Reactions     Lisinopril Swelling     One-sided facial swelling after being on lisinopril/HCTZ for one week.              Medications:     Current Facility-Administered Medications Ordered in Epic   Medication Dose Route Frequency Last Rate Last Dose     famotidine (PEPCID) suspension 20 mg  20 mg Oral or Feeding Tube Q24H   20 mg at 10/20/17 1137     protein modular (PROSource TF) 1 packet  1 packet Per Feeding Tube Daily   1 packet at 10/20/17 2019     glucose 40 % gel 15-30 g  15-30 g Oral Q15 Min PRN        Or      dextrose 50 % injection 25-50 mL  25-50 mL Intravenous Q15 Min PRN        Or     glucagon injection 1 mg  1 mg Subcutaneous Q15 Min PRN         insulin aspart (NovoLOG) inj (RAPID ACTING)  1-6 Units Subcutaneous Q4H         lidocaine 1 % 0.5-5 mL  0.5-5 mL Other Once PRN         lidocaine (LMX4) kit   Topical Once PRN         sodium chloride (PF) 0.9% PF flush 5-50 mL  5-50 mL Intracatheter Once PRN         heparin lock flush 10 UNIT/ML injection 2-5 mL  2-5 mL Intracatheter Once PRN         metoprolol (LOPRESSOR) suspension 12.5 mg  12.5 mg Oral BID   12.5 mg at 10/20/17 2123     nafcillin IV 2 g in D5W 100 mL infusion   Intravenous Q4H 100 mL/hr at 10/20/17 2122       rifampin (REIFADEN) suspension 300 mg  300 mg Oral or Feeding Tube BID   300 mg at 10/20/17 2122     polyethylene glycol (MIRALAX/GLYCOLAX) Packet 17 g  17 g Oral Daily PRN         senna-docusate (SENOKOT-S;PERICOLACE) 8.6-50 MG per tablet 1-2 tablet  1-2 tablet Oral or Feeding Tube BID PRN         acetaminophen (TYLENOL) solution 1,000 mg  1,000 mg Oral or Feeding Tube Q6H   1,000 mg at 10/20/17 2019     gentamicin (GARAMYCIN) infusion 80 mg  80 mg Intravenous Q24H   80 mg at 10/20/17 0830     dexmedetomidine (PRECEDEX) 400 mcg in 0.9% sodium chloride 100 mL  0.2-0.7 mcg/kg/hr Intravenous Continuous 4.6 mL/hr at 10/21/17 0000 0.2 mcg/kg/hr at 10/21/17 0000     nystatin (MYCOSTATIN) suspension 500,000 Units  500,000 Units Swish & Spit Q6H   500,000 Units at 10/20/17 1707     etomidate (AMIDATE) injection   Intravenous PRN   16 mg at 10/14/17 1913     rocuronium (ZEMURON) injection   Intravenous PRN   100 mg at 10/14/17 1913     aspirin chewable tablet 81 mg  81 mg Oral or Feeding Tube Daily   81 mg at 10/20/17 0858     lidocaine BUFFERED 1 % injection 1-30 mL  1-30 mL Intradermal Once PRN         ondansetron (ZOFRAN-ODT) ODT tab 4 mg  4 mg Oral Q6H PRN        Or     ondansetron (ZOFRAN) injection 4 mg  4 mg Intravenous Q6H PRN         hydrALAZINE  (APRESOLINE) injection 10-20 mg  10-20 mg Intravenous Q30 Min PRN   10 mg at 10/20/17 0327     levofloxacin (LEVAQUIN) infusion 750 mg  750 mg Intravenous Q48H 100 mL/hr at 10/20/17 0908 750 mg at 10/20/17 0908     influenza quadrivalent (PF) vacc age 3 yrs and older (FLUZONE or Flulaval) injection 0.5 mL  0.5 mL Intramuscular Prior to discharge         pneumococcal vaccine (PNEUMOVAX 23-sophy) injection 0.5 mL  0.5 mL Intramuscular Prior to discharge         sodium chloride (PF) 0.9% PF flush 3 mL  3 mL Intravenous Q1H PRN         sodium chloride (PF) 0.9% PF flush 3 mL  3 mL Intravenous Q8H   3 mL at 10/20/17 1631     May take oral meds with a sip of water, the morning of OWEN procedure.   Does not apply Continuous PRN         multivitamins with minerals (CERTAVITE/CEROVITE) liquid 15 mL  15 mL Per Feeding Tube Daily   15 mL at 10/20/17 0858     prochlorperazine (COMPAZINE) tablet 5-10 mg  5-10 mg Oral or Feeding Tube Q6H PRN        Or     prochlorperazine (COMPAZINE) injection 5-10 mg  5-10 mg Intravenous Q6H PRN         naloxone (NARCAN) injection 0.1-0.4 mg  0.1-0.4 mg Intravenous Q2 Min PRN         dextrose 10 % 1,000 mL infusion   Intravenous Continuous PRN         lidocaine 1 % 1 mL  1 mL Other Q1H PRN         lidocaine (LMX4) kit   Topical Q1H PRN         sodium chloride (PF) 0.9% PF flush 3 mL  3 mL Intracatheter Q8H   3 mL at 10/20/17 1631     Reason beta blocker order not selected   Does not apply DOES NOT GO TO MAR         insulin aspart (NovoLOG) inj (RAPID ACTING)   Subcutaneous With Snacks or Supplements         albuterol (PROAIR HFA/PROVENTIL HFA/VENTOLIN HFA) Inhaler 6 puff  6 puff Inhalation Q4H PRN         albuterol neb solution 2.5 mg  2.5 mg Nebulization Q2H PRN         fentaNYL (PF) (SUBLIMAZE) injection  mcg   mcg Intravenous Q1H PRN   50 mcg at 10/20/17 2108     potassium chloride SA (K-DUR/KLOR-CON M) CR tablet 20-40 mEq  20-40 mEq Oral Q2H PRN         potassium chloride (KLOR-CON)  Packet 20-40 mEq  20-40 mEq Oral or Feeding Tube Q2H PRN   20 mEq at 10/19/17 1235     potassium chloride 10 mEq in 100 mL intermittent infusion  10 mEq Intravenous Q1H PRN         potassium chloride 10 mEq in 100 mL intermittent infusion with 10 mg lidocaine  10 mEq Intravenous Q1H PRN         potassium chloride 20 mEq in 100 mL NON-STANDARD CONC intermittent infusion  20 mEq Intravenous Q1H PRN   20 mEq at 10/18/17 1717     magnesium sulfate 2 g in NS intermittent infusion (PharMEDium or FV Cmpd)  2 g Intravenous Daily PRN         magnesium sulfate 4 g in 100 mL sterile water (premade)  4 g Intravenous Q4H PRN         potassium phosphate 10 mmol in D5W 250 mL intermittent infusion  10 mmol Intravenous Daily PRN   10 mmol at 10/15/17 0207     potassium phosphate 15 mmol in D5W 250 mL intermittent infusion  15 mmol Intravenous Daily PRN   15 mmol at 10/08/17 0554     potassium phosphate 20 mmol in D5W 500 mL intermittent infusion  20 mmol Intravenous Q6H PRN         potassium phosphate 20 mmol in D5W 250 mL intermittent infusion  20 mmol Intravenous Q6H PRN   20 mmol at 10/07/17 1931     potassium phosphate 25 mmol in D5W 500 mL intermittent infusion  25 mmol Intravenous Q8H PRN         diphenhydrAMINE (BENADRYL) capsule 25 mg  25 mg Oral Q6H PRN        Or     diphenhydrAMINE (BENADRYL) injection 25 mg  25 mg Intravenous Q6H PRN         No current Epic-ordered outpatient prescriptions on file.             Review of Systems:   The 10 point Review of Systems is negative other than noted in the HPI            Physical Exam:   Vitals were reviewed  Temp: 99.4  F (37.4  C) Temp src: Axillary BP: 103/64   Heart Rate: 75 Resp: 16 SpO2: 100 % O2 Device: Mechanical Ventilator      Head and neck exam: Swelling on left side of neck, Inferior and posterior to the angle of the mandible in the area of the mid neck.      Intraoral exam:Evaluation shows a remnant of a tooth in the area of #22, the lower left canine. This could be  related to the swelling however there was no response on palpation.       Data:   Radiographic interpretation:  Orthopantomogram: not taken  Osseous pathology: None visible on clinical exam  Pulpal Pathology: None visible on clinical exam  Periodontal Pathology: None visible on clinical exam  Caries: [id number] #[id number]  Odontogenic pathology: None visible on clinical exam    Renetta Diop  [id number]  Pager: [phone]

## 2017-10-21 NOTE — PROGRESS NOTES
[facility] ID SERVICE: ONGOING CONSULTATION   Cricket Miguel : 1955 Sex: male:   Medical record number [id number] Attending Physician: Ramesh Kuo, *  Date of Service: 2017    ID PROBLEM LIST:  1. MSSA prosthetic aortic valve endocarditis with fluid collection noted in the aortic root and ascending thoracic aorta  - Blood cx positive 10/10, 10/11, 10/13   - Daily blood cx NGTD 10/14-10/20  2. Left knee pain status post aspriration 10/6; wbc count not consistent with septic arthritis (3,897) but cultures grew out MSSA. Status post I&D 10/8 with negative cultures.   3. Multiple bilateral CNS infarcts, presumed due to septic emboli  4. Left neck swelling with remnant of lower left canine on dental exam 10/20; OMFS eval pending  5. Ongoing critical illness with mechanical ventilation  6. Acute on CKD - stable       RECOMMENDATIONS:   1. Continue nafcillin 2g IV Q4 hours and Levofloxacin 750mg IV Q48 hours (added for better CNS penetration); will need a prolonged course of therapy.   2. Continue gentamicin through upcoming planned surgery.  3. Continue rifampin.    4. Okay to stop daily blood cultures but would repeat if WBC increases or recurrent fevers develop.   5. During upcoming surgery, please obtain intraoperative samples for gram stain/culture.  If viable organisms still present, will need to extend treatment course.   6. Agree with OMFS evaluation.     DISCUSSION:  61 yo male status post Bentall procedure in 2016 (graft replacement of the aortic valve, aortic root and ascending aorta) admitted 10/6 for evaluation of change in mental status found to have MSSA bacteremia, multiple bilateral CNS infarcts, fluid collection around the Ao root and ascending thoracic aorta, and a swollen left knee with MSSA on culture status post I&D. Although cultures have cleared, WBC is down and fevers improved, unlikely that he will clear this infection without surgical intervention, which is  "now planned for some time next week. Would continue current antimicrobials (rifampin, gent, nafcillin and levo) through time of surgery. Please obtain intraoperative samples for culture and gram stain. Only new issue today is left neck swelling with possible lower molar abscess on exam; agree with OMFS evalation and drainage of any fluid collections if present.      ID will continue to follow. I am on through the weekend and Dr. Philip will assume care on Monday. Feel free to call with questions.   Recs were discussed with patient's significant other, nurse and CVTS team today.     Linda Grayson MD  [phone]  _____________________________________________________________________________________  SUBJECTIVE: (Recommend ? 4 systems)   Remains intubated/sedated.  Seen by ortho and dental services yesterday.  Cultures have remained negative, fevers have resolved, and WBC has normalized.     ROS:(Recommend ? 2systems)   Unable to obtain given intubation/sedation    Allergies   Allergen Reactions     Lisinopril Swelling     One-sided facial swelling after being on lisinopril/HCTZ for one week.      CURRENT ANTI-INFECTIVES:   Nafcillin, start 10/7  Gentamicin, start 10/6  Levofloxacin, start 10/7  Rifampin, start 10/19    EXAMINATION: (Recommend ? 5 systems)   Vital Signs: /64  Pulse 82  Temp 99.1  F (37.3  C) (Bladder)  Resp 16  Ht 1.727 m (5' 8\")  Wt 96.8 kg (213 lb 6.5 oz)  SpO2 98%  BMI 32.45 kg/m2   GENERAL:  Sedated, intubated , occ grimaces   EYES: No icturus  ENT:  Intubated   LUNGS: Course BS bilaterally  CARDIOVASCULAR:  Regular rate and rhythm with no murmurs, gallops or rubs.  ABDOMEN:  Normal bowel sounds, soft, nontender. No appreciable hepatosplenomegaly  SKIN:  No acute rashes.    MS: Left knee with sutures in place, no warmth, minimal swelling. Other joints unremarkable.  NEUROLOGIC:  Does not respond to commands       NEW DATA/RESULTS:   Culture Micro   Date Value Ref Range Status "   10/21/2017 PENDING  Preliminary   10/20/2017 No growth after 1 day  Preliminary   10/20/2017 No growth after 1 day  Preliminary   10/19/2017 No growth after 2 days  Preliminary   10/19/2017 No growth after 2 days  Preliminary       Recent Labs   Lab Test  10/21/17   0335  10/16/17   0323  10/09/17   0316  10/07/17   1333  10/06/17   1859  05/06/13   1703   CRP  44.0*  48.0*  240.0*  290.0*  310.0*  <5.0     Recent Labs   Lab Test  10/21/17   0357  10/20/17   0412  10/19/17   0354  10/18/17   0401  10/17/17   0441  10/16/17   0323   WBC  10.9  13.5*  18.6*  14.7*  15.8*  18.3*     Recent Labs   Lab Test  10/21/17   0335  10/20/17   2225  10/20/17   1703  10/20/17   0412   CR  2.08*  1.93*  2.04*  2.08*   GFRESTIMATED  32*  35*  33*  32*     Hematology Studies  Recent Labs   Lab Test  10/21/17   0357  10/20/17   0412  10/19/17   0354  10/19/17   0027  10/18/17   0401  10/17/17   0441  10/16/17   0323   10/06/17   1020   05/06/13   1703  12/04/12   1016  02/02/11   1506   11/06/09   1155  11/02/09   1717   WBC  10.9  13.5*  18.6*   --   14.7*  15.8*  18.3*   < >  17.1*   < >  10.6  11.9*  11.9*   < >  9.2  12.1*   ANEU   --    --    --    --    --    --    --    --   15.7*   --   7.2  7.9  8.2   --   5.9  8.3   AEOS   --    --    --    --    --    --    --    --   0.0   --   0.0  0.4  0.3   --   0.3  0.3   HCT  23.0*  24.4*  27.8*   --   26.2*  30.1*  32.5*   < >  43.8   < >  46.2  47.4  46.8   < >  47.1  45.9   PLT  344  334  351  330  297  287  309   < >  185   < >  315  259  315   < >  303  312    < > = values in this interval not displayed.     Metabolic  Recent Labs   Lab Test  10/21/17   0335  10/20/17   2225  10/20/17   1703   NA  152*  149*  151*   BUN  55*  52*  59*   CO2  23  23  24   CR  2.08*  1.93*  2.04*   GFRESTIMATED  32*  35*  33*     Hepatic Studies  Recent Labs   Lab Test  10/21/17   0335  10/06/17   1020  06/03/16   1716  04/26/16   0654   BILITOTAL   --   1.5*  0.3  0.6   ALKPHOS   --   85  104   91   ALBUMIN   --   2.8*  3.4  3.1*   AST  52*  24  19  20   ALT  35  33  32  26     Immunologlobulins  Recent Labs   Lab Test  10/21/17   0357  05/06/13   1703  02/02/11   1506   SED  108*  6  8     CXR 10/21 personally reviewed. No new concerning infiltrates.      PET Oncology Whole body 10/19/17    IMPRESSION:   1. Multifocal hypermetabolic areas in the ascending aortic graft with  associated periaortic fluid collection which has a hypermetabolic rim.  These findings are concerning for aortic graft infection.  2. 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 This may  represent spread of infection from the aortic root to the pulmonary  artery.   2a. Right ventricular wall FDG uptake which is abnormal.  The  differential includes right heart strain, pulmonary hypertension,  myocarditis. Concerning for infection given its contiguous with the  pulmonary artery uptake.   3. Small FDG avid left knee joint effusion, likely infectious.

## 2017-10-21 NOTE — CONSULTS
[facility] Consultation   Cricket Miguel   : 1955 Sex: male MRN: [id number]    Consultation regarding facial infection requested by Cherry Guo of cardiothoracic surgery    ASSESSMENT:  62 year old male with PMH significant for CHF, aortic insufficiency (s/p graft replacement of the aortic valve, aortic root and ascending aorta), COPD, HTN, and smoking admitted to the ICU for encephalopathy found to have large moises-aoritc fluid collection and multiple strokes, likely septic emboli.  Patient has developed swelling associated with left neck, inferior to the mandible, that is nonodontogenic in origin. Patient has nonrestorable tooth #22 that has no evidence of current infection.      PLAN/RECOMMENDATIONS:  - Swelling associated with left neck nonodontogenic in origin.   - Recommend continuing antibiotics per ID and pursuing CT Neck with contrast (if permitted by patient's JOSE) and ENT referral  - Patient to follow-up outpatient with [facility] for evaluation of nonrestorable dentition and extraction under local anesthesia. Please page resident to arrange outpatient appointment.   - Recommended continued oral hygiene per ICU protocol    Please do not hesitate to contact me with questions.    Patient's findings, assessment and plan discussed with chief resident, Dr. Yves Suárez, who will discuss with staff surgeon, Dr. Marcelo Thomson.     Chau Dan  Pager: [phone]  Oral & Maxillofacial Surgery Resident  SUBJECTIVE  History of Present Illness:  Cricket Miguel is a 62 year old male admitted to ICU for encephalopathy on 10/11/2017 from [facility]. Patient is intubated. MRI has revealed bilateral embolic strokes and cerebral edema, and he was found to have blood cultures positive for MSSA with a OWEN revealing vegetations on a ventricular leaflet of his aortic valve suggestive of endocarditis. His swollen left knee underwent I&D and was shown to have positive MSSA culture. More recently on 10/20/2017, the patient  was noted to have swelling of the left neck by his girlfriend. She states that it has not changed in size since she has noticed it. Both his girlfriend and the nurse deny any issues with purulent drainage from the head, neck or mouth.     Medical/Surgical History :  Past Medical History:   Diagnosis Date     AI (aortic insufficiency)      Aortic root dilatation (H)      AR (aortic regurgitation)     severe     Cardiomyopathy (H)      CHF (congestive heart failure), NYHA class II (H)      COPD, mild (H)     spirometry 12/16     Heart murmur      Hyperlipidemia LDL goal <70 10/31/2010     Hypertension goal BP (blood pressure) < 140/90      Inguinal hernia      left lung nodule  1/29/2008     Major depressive disorder, single episode, moderate (H)      Psoriasis childhood     Tobacco use disorder      Past Surgical History:   Procedure Laterality Date     ARTHROSCOPY KNEE INCISION AND DRAINAGE Left 10/8/2017    Procedure: ARTHROSCOPY KNEE INCISION AND DRAINAGE;  Arthroscopic Incision and Drainage of Left Knee;  Surgeon: Lucretia Munoz MD;  Location: [facility] OR     HC SHOULDER ARTHROSCOPY, DX  2001    Arthroscopy, Shoulder RT Dr OSIRIS Andersen     HC SHOULDER ARTHROSCOPY, DX  1/04    LT arthroscopy Dr OSIRIS Andersen     HERNIA REPAIR, UMBILICAL  1/08    incarcerated - dr rockwell     HERNIORRHAPHY INGUINAL  12/21/2012    HERNIORRHAPHY INGUINAL;  Right Inguinal Hernia Repair with mesh ;  Surgeon: Mello Rockwell MD;  Location: [facility] OR     REPLACE VALVE AORTIC N/A 5/17/2016    REPLACE VALVE AORTIC - Dr Bowers     ROTATOR CUFF REPAIR RT/LT  11/10    Rt arthroscopy - Dr OSIRIS Andersen     SURGICAL HISTORY OF -   5/16    AVR, severe AR, aortic root repair     Problem List:  Patient Active Problem List   Diagnosis     Tobacco use disorder     Hypertension goal BP (blood pressure) < 140/90     Disease of lung     Major depressive disorder, single episode, moderate (HCC)     Advance Care Planning     Psoriasis     COPD, mild (H)     CHF  (congestive heart failure), NYHA class II (H)     AR (aortic regurgitation)     AI (aortic insufficiency)     Aortic root dilatation (H)     Hyperlipidemia LDL goal <70     Health Care Home     Status post coronary angiogram     Cardiomyopathy (H)     S/P AVR (aortic valve replacement)     H/O aortic root repair     Anemia     Endocarditis     Encephalopathy     Medications:  Current Facility-Administered Medications   Medication     acetaminophen (TYLENOL) solution 975 mg     famotidine (PEPCID) suspension 20 mg     protein modular (PROSource TF) 1 packet     glucose 40 % gel 15-30 g    Or     dextrose 50 % injection 25-50 mL    Or     glucagon injection 1 mg     insulin aspart (NovoLOG) inj (RAPID ACTING)     lidocaine 1 % 0.5-5 mL     lidocaine (LMX4) kit     sodium chloride (PF) 0.9% PF flush 5-50 mL     heparin lock flush 10 UNIT/ML injection 2-5 mL     metoprolol (LOPRESSOR) suspension 12.5 mg     nafcillin IV 2 g in D5W 100 mL infusion     rifampin (REIFADEN) suspension 300 mg     polyethylene glycol (MIRALAX/GLYCOLAX) Packet 17 g     senna-docusate (SENOKOT-S;PERICOLACE) 8.6-50 MG per tablet 1-2 tablet     gentamicin (GARAMYCIN) infusion 80 mg     dexmedetomidine (PRECEDEX) 400 mcg in 0.9% sodium chloride 100 mL     nystatin (MYCOSTATIN) suspension 500,000 Units     aspirin chewable tablet 81 mg     lidocaine BUFFERED 1 % injection 1-30 mL     ondansetron (ZOFRAN-ODT) ODT tab 4 mg    Or     ondansetron (ZOFRAN) injection 4 mg     hydrALAZINE (APRESOLINE) injection 10-20 mg     levofloxacin (LEVAQUIN) infusion 750 mg     influenza quadrivalent (PF) vacc age 3 yrs and older (FLUZONE or Flulaval) injection 0.5 mL     pneumococcal vaccine (PNEUMOVAX 23-sophy) injection 0.5 mL     sodium chloride (PF) 0.9% PF flush 3 mL     sodium chloride (PF) 0.9% PF flush 3 mL     May take oral meds with a sip of water, the morning of OWEN procedure.     multivitamins with minerals (CERTAVITE/CEROVITE) liquid 15 mL      "prochlorperazine (COMPAZINE) tablet 5-10 mg    Or     prochlorperazine (COMPAZINE) injection 5-10 mg     naloxone (NARCAN) injection 0.1-0.4 mg     dextrose 10 % 1,000 mL infusion     lidocaine 1 % 1 mL     lidocaine (LMX4) kit     sodium chloride (PF) 0.9% PF flush 3 mL     Reason beta blocker order not selected     insulin aspart (NovoLOG) inj (RAPID ACTING)     albuterol (PROAIR HFA/PROVENTIL HFA/VENTOLIN HFA) Inhaler 6 puff     albuterol neb solution 2.5 mg     fentaNYL (PF) (SUBLIMAZE) injection  mcg     potassium chloride SA (K-DUR/KLOR-CON M) CR tablet 20-40 mEq     potassium chloride (KLOR-CON) Packet 20-40 mEq     potassium chloride 10 mEq in 100 mL intermittent infusion     potassium chloride 10 mEq in 100 mL intermittent infusion with 10 mg lidocaine     potassium chloride 20 mEq in 100 mL NON-STANDARD CONC intermittent infusion     magnesium sulfate 2 g in NS intermittent infusion (PharMEDium or FV Cmpd)     magnesium sulfate 4 g in 100 mL sterile water (premade)     potassium phosphate 10 mmol in D5W 250 mL intermittent infusion     potassium phosphate 15 mmol in D5W 250 mL intermittent infusion     potassium phosphate 20 mmol in D5W 500 mL intermittent infusion     potassium phosphate 20 mmol in D5W 250 mL intermittent infusion     potassium phosphate 25 mmol in D5W 500 mL intermittent infusion     diphenhydrAMINE (BENADRYL) capsule 25 mg    Or     diphenhydrAMINE (BENADRYL) injection 25 mg     Facility-Administered Medications Ordered in Other Encounters   Medication     etomidate (AMIDATE) injection     rocuronium (ZEMURON) injection     Allergies:    Allergies   Allergen Reactions     Lisinopril Swelling     One-sided facial swelling after being on lisinopril/HCTZ for one week.      Review of Systems:  Unable to obtain review of systems due to patient being intubated and sedated.     OBJECTIVE    /64  Pulse 82  Temp 99.3  F (37.4  C)  Resp 30  Ht 1.727 m (5' 8\")  Wt 96.8 kg (213 lb " 6.5 oz)  SpO2 98%  BMI 32.45 kg/m2  Physical Exam:   Constitutional:  Patient intubated and sedated, no distress  HEENT: There is swelling associated with the left neck, inferior to the left angle of the mandible. This area is nontender to palpation, no erythema or redness noted. The inferior border of the mandible is palpable in its entirety. No drainage of purulence is appreciated. The neck is otherwise soft and supple.      Intraoral: Intraoral exam limited due to intubation. The anterior buccal vestibules are free of edema, erythema, or purulence. The patient is edentulous in the maxillary and mandibular arches via digital inspection save for the left mandibular canine. Floor of mouth soft nontender, nondistended.    Pulmonary: Breathing through ventilator, chest rise symmetric    LABS:   CBC RESULTS:   Recent Labs   Lab Test  10/21/17   0357   WBC  10.9   RBC  2.42*   HGB  7.3*   HCT  23.0*   MCV  95   MCH  30.2   MCHC  31.7   RDW  18.3*   PLT  344         RADIOLOGY:  Review of PET/CT 10/19/2017 shows only left mandibular canine visible, although scan is not particularly sensitive for small remaining teeth. No signs of uptake or active infection in the head/neck.

## 2017-10-21 NOTE — PROCEDURES
EEG#:  -3      DATE OF STUDY:  10/19/2017.      TYPE OF STUDY:  Inpatient video EEG monitoring.       DURATION OF RECORDIN hours 4 minutes 18 seconds.      HISTORY:  Day 3 of video EEG monitoring on patient, Cricket Miguel, a 62-year-old with mental status changes.  He has had multiple strokes that are likely related to septic emboli and have involved the left cerebellar, left MCA and right occipital regions.  He is described as significantly encephalopathic and concern is raised about ongoing seizures.  He is not currently being treated with anti-seizure medications.  P.r.n. fentanyl and Precedex is being administered.      TECHNICAL SUMMARY:  EEG was recorded from scalp electrodes placed according to the 10-20 international system.  Additional electrodes were utilized for referencing, artifact detection, and recording from other cerebral regions.  Video was continuously recorded.  Video was reviewed for clinical correlation and to assist with EEG interpretation.      FINDINGS:  While awake, a 10 Hz posterior dominant rhythm is seen on the right.  This activity is disrupted and attenuated over the left hemisphere.  Near continuous 4 Hz theta is seen throughout, amplitude maximum in the central regions.  This waxes and wanes.  It does not evolve in a manner consistent with electrographic seizure.  During sleep, the theta rhythm and the posterior dominant rhythm disappear.  Occasional faster (6 Hz) theta bursts are seen.  Sleep spindles are also noted, but these are attenuated over the left hemisphere.        Reactivity is assessed following usual ICU protocol.  Stimulation does appear to reduce the theta rhythm transiently on several occasions.  Eye blinks are noted during this period of time, whereas they are less persistent at other times.      INTERICTAL ABNORMALITIES:  No epileptiform discharges.      ICTAL ABNORMALITIES:  No electrographic seizures.      IMPRESSION:  Abnormal.  There is evidence of  moderate diffuse encephalopathy.  There is evidence of additional focal cortical dysfunction over the left hemisphere.  This would correspond to the known structural abnormalities in this area.  EEG is reactive and there are changes between sleep and awake states.  Epileptiform activity or seizures are not seen at any point.      SUMMARY OF 3 DAYS OF VIDEO EEG MONITORING:  Moderate diffuse encephalopathy.  Reactive EEG.  Evidence of additional focal cortical dysfunction on the left.  No epileptiform activity or seizures.         NATAN REYNOSO MD             D: 10/20/2017 14:24   T: 10/20/2017 19:18   MT: TOMI      Name:     ASHTYN STROUD   MRN:      [id number]        Account:        [id number]   :      1955           Procedure Date: 10/19/2017      Document: [id number]

## 2017-10-22 ENCOUNTER — APPOINTMENT (OUTPATIENT)
Dept: GENERAL RADIOLOGY | Facility: CLINIC | Age: 62
DRG: 004 | End: 2017-10-22
Attending: PSYCHIATRY & NEUROLOGY
Payer: COMMERCIAL

## 2017-10-22 LAB
ANION GAP SERPL CALCULATED.3IONS-SCNC: 6 MMOL/L (ref 3–14)
ANION GAP SERPL CALCULATED.3IONS-SCNC: 7 MMOL/L (ref 3–14)
ANION GAP SERPL CALCULATED.3IONS-SCNC: 8 MMOL/L (ref 3–14)
BACTERIA SPEC CULT: NO GROWTH
BACTERIA SPEC CULT: NO GROWTH
BACTERIA SPEC CULT: NORMAL
BASE DEFICIT BLDA-SCNC: 1 MMOL/L
BUN SERPL-MCNC: 41 MG/DL (ref 7–30)
BUN SERPL-MCNC: 43 MG/DL (ref 7–30)
BUN SERPL-MCNC: 46 MG/DL (ref 7–30)
CALCIUM SERPL-MCNC: 7.2 MG/DL (ref 8.5–10.1)
CALCIUM SERPL-MCNC: 7.3 MG/DL (ref 8.5–10.1)
CALCIUM SERPL-MCNC: 7.4 MG/DL (ref 8.5–10.1)
CHLORIDE SERPL-SCNC: 117 MMOL/L (ref 94–109)
CHLORIDE SERPL-SCNC: 118 MMOL/L (ref 94–109)
CHLORIDE SERPL-SCNC: 119 MMOL/L (ref 94–109)
CO2 SERPL-SCNC: 23 MMOL/L (ref 20–32)
CO2 SERPL-SCNC: 23 MMOL/L (ref 20–32)
CO2 SERPL-SCNC: 24 MMOL/L (ref 20–32)
CREAT SERPL-MCNC: 1.71 MG/DL (ref 0.66–1.25)
CREAT SERPL-MCNC: 1.74 MG/DL (ref 0.66–1.25)
CREAT SERPL-MCNC: 1.86 MG/DL (ref 0.66–1.25)
ERYTHROCYTE [DISTWIDTH] IN BLOOD BY AUTOMATED COUNT: 18.7 % (ref 10–15)
GFR SERPL CREATININE-BSD FRML MDRD: 37 ML/MIN/1.7M2
GFR SERPL CREATININE-BSD FRML MDRD: 40 ML/MIN/1.7M2
GFR SERPL CREATININE-BSD FRML MDRD: 40 ML/MIN/1.7M2
GLUCOSE BLDC GLUCOMTR-MCNC: 131 MG/DL (ref 70–99)
GLUCOSE BLDC GLUCOMTR-MCNC: 138 MG/DL (ref 70–99)
GLUCOSE BLDC GLUCOMTR-MCNC: 141 MG/DL (ref 70–99)
GLUCOSE BLDC GLUCOMTR-MCNC: 148 MG/DL (ref 70–99)
GLUCOSE BLDC GLUCOMTR-MCNC: 151 MG/DL (ref 70–99)
GLUCOSE BLDC GLUCOMTR-MCNC: 152 MG/DL (ref 70–99)
GLUCOSE SERPL-MCNC: 146 MG/DL (ref 70–99)
GLUCOSE SERPL-MCNC: 147 MG/DL (ref 70–99)
GLUCOSE SERPL-MCNC: 156 MG/DL (ref 70–99)
HCO3 BLD-SCNC: 23 MMOL/L (ref 21–28)
HCT VFR BLD AUTO: 23.3 % (ref 40–53)
HGB BLD-MCNC: 7.4 G/DL (ref 13.3–17.7)
Lab: NORMAL
MCH RBC QN AUTO: 30.1 PG (ref 26.5–33)
MCHC RBC AUTO-ENTMCNC: 31.8 G/DL (ref 31.5–36.5)
MCV RBC AUTO: 95 FL (ref 78–100)
O2/TOTAL GAS SETTING VFR VENT: 40 %
OSMOLALITY SERPL: 318 MMOL/KG (ref 280–301)
OSMOLALITY SERPL: 326 MMOL/KG (ref 280–301)
OSMOLALITY SERPL: 326 MMOL/KG (ref 280–301)
PCO2 BLD: 36 MM HG (ref 35–45)
PH BLD: 7.42 PH (ref 7.35–7.45)
PLATELET # BLD AUTO: 365 10E9/L (ref 150–450)
PLATELET # BLD AUTO: 368 10E9/L (ref 150–450)
PO2 BLD: 148 MM HG (ref 80–105)
POTASSIUM SERPL-SCNC: 3.5 MMOL/L (ref 3.4–5.3)
POTASSIUM SERPL-SCNC: 3.7 MMOL/L (ref 3.4–5.3)
POTASSIUM SERPL-SCNC: 3.9 MMOL/L (ref 3.4–5.3)
RBC # BLD AUTO: 2.46 10E12/L (ref 4.4–5.9)
SODIUM SERPL-SCNC: 147 MMOL/L (ref 133–144)
SODIUM SERPL-SCNC: 149 MMOL/L (ref 133–144)
SODIUM SERPL-SCNC: 150 MMOL/L (ref 133–144)
SPECIMEN SOURCE: NORMAL
WBC # BLD AUTO: 10.2 10E9/L (ref 4–11)

## 2017-10-22 PROCEDURE — 25000132 ZZH RX MED GY IP 250 OP 250 PS 637: Performed by: STUDENT IN AN ORGANIZED HEALTH CARE EDUCATION/TRAINING PROGRAM

## 2017-10-22 PROCEDURE — 25000132 ZZH RX MED GY IP 250 OP 250 PS 637: Performed by: SURGERY

## 2017-10-22 PROCEDURE — 40000196 ZZH STATISTIC RAPCV CVP MONITORING

## 2017-10-22 PROCEDURE — 80048 BASIC METABOLIC PNL TOTAL CA: CPT | Performed by: STUDENT IN AN ORGANIZED HEALTH CARE EDUCATION/TRAINING PROGRAM

## 2017-10-22 PROCEDURE — 25000128 H RX IP 250 OP 636: Performed by: THORACIC SURGERY (CARDIOTHORACIC VASCULAR SURGERY)

## 2017-10-22 PROCEDURE — 25000132 ZZH RX MED GY IP 250 OP 250 PS 637: Performed by: THORACIC SURGERY (CARDIOTHORACIC VASCULAR SURGERY)

## 2017-10-22 PROCEDURE — 83930 ASSAY OF BLOOD OSMOLALITY: CPT | Performed by: ORTHOPAEDIC SURGERY

## 2017-10-22 PROCEDURE — 40000275 ZZH STATISTIC RCP TIME EA 10 MIN

## 2017-10-22 PROCEDURE — 25000125 ZZHC RX 250: Performed by: STUDENT IN AN ORGANIZED HEALTH CARE EDUCATION/TRAINING PROGRAM

## 2017-10-22 PROCEDURE — 00000146 ZZHCL STATISTIC GLUCOSE BY METER IP

## 2017-10-22 PROCEDURE — 85049 AUTOMATED PLATELET COUNT: CPT | Performed by: ORTHOPAEDIC SURGERY

## 2017-10-22 PROCEDURE — 20000004 ZZH R&B ICU UMMC

## 2017-10-22 PROCEDURE — 99291 CRITICAL CARE FIRST HOUR: CPT | Mod: GC | Performed by: ANESTHESIOLOGY

## 2017-10-22 PROCEDURE — 25000128 H RX IP 250 OP 636

## 2017-10-22 PROCEDURE — 40000014 ZZH STATISTIC ARTERIAL MONITORING DAILY

## 2017-10-22 PROCEDURE — 71010 XR CHEST PORT 1 VW: CPT

## 2017-10-22 PROCEDURE — 94003 VENT MGMT INPAT SUBQ DAY: CPT

## 2017-10-22 PROCEDURE — S0032 INJECTION, NAFCILLIN SODIUM: HCPCS | Performed by: STUDENT IN AN ORGANIZED HEALTH CARE EDUCATION/TRAINING PROGRAM

## 2017-10-22 PROCEDURE — 80048 BASIC METABOLIC PNL TOTAL CA: CPT | Performed by: ORTHOPAEDIC SURGERY

## 2017-10-22 PROCEDURE — 82803 BLOOD GASES ANY COMBINATION: CPT | Performed by: ORTHOPAEDIC SURGERY

## 2017-10-22 PROCEDURE — 83930 ASSAY OF BLOOD OSMOLALITY: CPT | Performed by: STUDENT IN AN ORGANIZED HEALTH CARE EDUCATION/TRAINING PROGRAM

## 2017-10-22 PROCEDURE — 85027 COMPLETE CBC AUTOMATED: CPT | Performed by: ORTHOPAEDIC SURGERY

## 2017-10-22 PROCEDURE — 25000128 H RX IP 250 OP 636: Performed by: STUDENT IN AN ORGANIZED HEALTH CARE EDUCATION/TRAINING PROGRAM

## 2017-10-22 PROCEDURE — 25000132 ZZH RX MED GY IP 250 OP 250 PS 637: Performed by: PHYSICIAN ASSISTANT

## 2017-10-22 RX ORDER — SODIUM CHLORIDE 9 MG/ML
INJECTION, SOLUTION INTRAVENOUS
Status: COMPLETED
Start: 2017-10-22 | End: 2017-10-22

## 2017-10-22 RX ADMIN — INSULIN ASPART 1 UNITS: 100 INJECTION, SOLUTION INTRAVENOUS; SUBCUTANEOUS at 20:25

## 2017-10-22 RX ADMIN — LEVOFLOXACIN 750 MG: 5 INJECTION, SOLUTION INTRAVENOUS at 10:03

## 2017-10-22 RX ADMIN — NAFCILLIN SODIUM: 2 INJECTION, POWDER, LYOPHILIZED, FOR SOLUTION INTRAMUSCULAR; INTRAVENOUS at 17:08

## 2017-10-22 RX ADMIN — ACETAMINOPHEN 975 MG: 325 SOLUTION ORAL at 00:00

## 2017-10-22 RX ADMIN — NAFCILLIN SODIUM: 2 INJECTION, POWDER, LYOPHILIZED, FOR SOLUTION INTRAMUSCULAR; INTRAVENOUS at 20:21

## 2017-10-22 RX ADMIN — Medication 300 MG: at 20:21

## 2017-10-22 RX ADMIN — Medication 1 PACKET: at 08:01

## 2017-10-22 RX ADMIN — FENTANYL CITRATE 50 MCG: 50 INJECTION, SOLUTION INTRAMUSCULAR; INTRAVENOUS at 21:10

## 2017-10-22 RX ADMIN — ASPIRIN 81 MG CHEWABLE TABLET 81 MG: 81 TABLET CHEWABLE at 08:00

## 2017-10-22 RX ADMIN — MULTIVITAMIN 15 ML: LIQUID ORAL at 08:01

## 2017-10-22 RX ADMIN — NYSTATIN 500000 UNITS: 100000 SUSPENSION ORAL at 02:22

## 2017-10-22 RX ADMIN — ACETAMINOPHEN 975 MG: 325 SOLUTION ORAL at 08:00

## 2017-10-22 RX ADMIN — INSULIN ASPART 1 UNITS: 100 INJECTION, SOLUTION INTRAVENOUS; SUBCUTANEOUS at 14:19

## 2017-10-22 RX ADMIN — FAMOTIDINE 20 MG: 40 POWDER, FOR SUSPENSION ORAL at 08:00

## 2017-10-22 RX ADMIN — NYSTATIN 500000 UNITS: 100000 SUSPENSION ORAL at 08:01

## 2017-10-22 RX ADMIN — GENTAMICIN SULFATE 80 MG: 80 INJECTION, SOLUTION INTRAVENOUS at 08:18

## 2017-10-22 RX ADMIN — SODIUM CHLORIDE 500 ML: 9 INJECTION, SOLUTION INTRAVENOUS at 01:03

## 2017-10-22 RX ADMIN — METOPROLOL TARTRATE 12.5 MG: 100 TABLET, FILM COATED ORAL at 20:21

## 2017-10-22 RX ADMIN — NAFCILLIN SODIUM: 2 INJECTION, POWDER, LYOPHILIZED, FOR SOLUTION INTRAMUSCULAR; INTRAVENOUS at 08:23

## 2017-10-22 RX ADMIN — SODIUM CHLORIDE 100 ML: 9 INJECTION, SOLUTION INTRAVENOUS at 03:03

## 2017-10-22 RX ADMIN — NYSTATIN 500000 UNITS: 100000 SUSPENSION ORAL at 14:57

## 2017-10-22 RX ADMIN — DEXMEDETOMIDINE HYDROCHLORIDE 0.2 MCG/KG/HR: 4 INJECTION, SOLUTION INTRAVENOUS at 00:59

## 2017-10-22 RX ADMIN — ACETAMINOPHEN 975 MG: 325 SOLUTION ORAL at 17:07

## 2017-10-22 RX ADMIN — POTASSIUM CHLORIDE 20 MEQ: 1.5 POWDER, FOR SOLUTION ORAL at 04:25

## 2017-10-22 RX ADMIN — METOPROLOL TARTRATE 12.5 MG: 100 TABLET, FILM COATED ORAL at 10:02

## 2017-10-22 RX ADMIN — NYSTATIN 500000 UNITS: 100000 SUSPENSION ORAL at 20:21

## 2017-10-22 RX ADMIN — INSULIN ASPART 1 UNITS: 100 INJECTION, SOLUTION INTRAVENOUS; SUBCUTANEOUS at 00:18

## 2017-10-22 RX ADMIN — Medication 300 MG: at 10:02

## 2017-10-22 RX ADMIN — NAFCILLIN SODIUM: 2 INJECTION, POWDER, LYOPHILIZED, FOR SOLUTION INTRAMUSCULAR; INTRAVENOUS at 00:01

## 2017-10-22 RX ADMIN — NAFCILLIN SODIUM: 2 INJECTION, POWDER, LYOPHILIZED, FOR SOLUTION INTRAMUSCULAR; INTRAVENOUS at 03:56

## 2017-10-22 RX ADMIN — INSULIN ASPART 1 UNITS: 100 INJECTION, SOLUTION INTRAVENOUS; SUBCUTANEOUS at 08:22

## 2017-10-22 RX ADMIN — NAFCILLIN SODIUM: 2 INJECTION, POWDER, LYOPHILIZED, FOR SOLUTION INTRAMUSCULAR; INTRAVENOUS at 12:30

## 2017-10-22 NOTE — PROGRESS NOTES
Status  D/I: Patient on unit 4A Surgical/Neuro ICU s/p large moises-aortic fluid collection and multiple strokes likely septic emboli.    Neuro- withdraws on LLE only, opens eyes to voice, pupils equal and reactive,  dex @ 0.2 mcg/kg/min stopped throughout the day, but restarted with restlessness at night.  CV-  SR, BP and HR increasing by end of night, precedex restarted, scheduled tylenol  Pulm- PS 7/5, RR 15-20, lungs clear to coarse- diminished bases, small amount of white secretions from ETT  GI-  TF @ 50 (goal), Na 151-  free water75/hr, rectal tube- watery stool   - Espinoza, UOP > 150 cc/hr   Skin-  no new issues    Social-  Family at bedside and updated-  Multiple updates throughout the day for many, many questions -MDs and multiple teams updating family.  See flow sheets for further interventions and assessments.  P: Continue to monitor pt closely, Notify MD of changes/concerns,  Possible trial exutbation on Sunday, possible procedures to correct infection in his heart next week

## 2017-10-22 NOTE — PHARMACY-AMINOGLYCOSIDE DOSING SERVICE
Pharmacy Aminoglycoside Follow-Up Note  Date of Service 2017  Patient's  1955   62 year old, male    Weight (Adjusted): 78.2 kg    Indication: Endocarditis  Current Gentamicin regimen:  80 mg IV q24h  Day of therapy: 16    Target goals based on synergy dosing  Goal Peak level: 3-5 mg/L  Goal Trough level: <1 mg/L    Current estimated CrCl: Estimated Creatinine Clearance: 44.6 mL/min (based on Cr of 1.89).    Creatinine for last 3 days  10/19/2017: 12:27 AM Creatinine 2.23 mg/dL;  3:54 AM Creatinine 2.21 mg/dL; 10:13 AM Creatinine 2.20 mg/dL;  4:08 PM Creatinine 2.16 mg/dL; 10:07 PM Creatinine 2.09 mg/dL  10/20/2017:  4:12 AM Creatinine 2.08 mg/dL;  5:03 PM Creatinine 2.04 mg/dL; 10:25 PM Creatinine 1.93 mg/dL  10/21/2017:  3:35 AM Creatinine 2.08 mg/dL;  2:19 PM Creatinine 1.94 mg/dL;  8:24 PM Creatinine 1.89 mg/dL    Nephrotoxins and other renal medications (Future)    Start     Dose/Rate Route Frequency Ordered Stop    10/19/17 1430  rifampin (REIFADEN) suspension 300 mg      300 mg Oral or Feeding Tube 2 TIMES DAILY 10/19/17 1359      10/19/17 1400  nafcillin IV 2 g in D5W 100 mL infusion     Comments:  Indication: bacteremia    100 mL/hr  Intravenous EVERY 4 HOURS 10/19/17 1204      10/17/17 0859  gentamicin (GARAMYCIN) infusion 80 mg      80 mg  over 60 Minutes Intravenous EVERY 24 HOURS 10/16/17 2101 10/27/17 0859          Contrast Orders - past 72 hours (72h ago through future)    Start     Dose/Rate Route Frequency Ordered Stop    10/19/17 0745  fluorodeoxyglucose F-18 (FDG) radioisotope injection 1-18 mCi      1-18 mCi Intravenous ONCE 10/19/17 0730 10/19/17 0805          Aminoglycoside Levels - past 2 days  10/21/2017:  2:19 PM Gentamicin Level 2.6 mg/L (3.67 hours post dose);  8:24 PM Gentamicin Level 1.5 mg/L (9.82 hours post dose)    Aminoglycosides IV Administrations (past 72 hours)                   gentamicin (GARAMYCIN) infusion 80 mg (mg) 80 mg New Bag 10/21/17 0936     80 mg  New Bag 10/20/17 0830     80 mg New Bag 10/19/17 0955                Pharmacokinetic Analysis  Calculated Peak level: 3.6 mg/L  Calculated Trough level: 0.46 mg/L  Volume of distribution: 0.31 L/kg  Half-life: 7.7 hours          Interpretation of levels and current regimen:  Aminoglycoside levels are within goal range    Has serum creatinine changed greater than 50% in the last 72 hours: No, but SCr continues to downtrend    Urine output:  good urine output (1.3 mL/kg/hr)    Renal function: Improving    Plan  1. Continue current dose    2.  Method of evaluation: 2 post dose levels    3. Pharmacy will continue to follow and check levels  as appropriate in 1-3 Days    Karo Mccracken PharmD  Antimicrobial Stewardship & Infectious Diseases Pharmacist  Office Ph: [phone]  Pager: [phone]

## 2017-10-22 NOTE — PROGRESS NOTES
CV ICU Progress Note    POD: 14 Days Post-Op  LOS: 16    Admission History: Mr. Cricket Miguel is a 62 year old gentleman w/ h/o AVR and ascending aortic repair in 5/2016 admitted with acute mental status changes found to have large moises-aortic fluid collection and multiple strokes likely septic emboli.    S/Interval History: No acute events overnight. Patient mental status mostly stable may be improving slightly.    O:    Temp: 99.7  F (37.6  C) Temp  Min: 98.8  F (37.1  C)  Max: 100.2  F (37.9  C)  Resp: 20 Resp  Min: 15  Max: 30  SpO2: 98 % SpO2  Min: 96 %  Max: 100 %    No Data Recorded  Heart Rate: 90 Heart Rate  Min: 68  Max: 96    No data recorded.  No data recorded.    Ventilation Mode: CPAP/PS  FiO2 (%): 40 %  PEEP (cm H2O): 5 cmH2O  Pressure Support (cm H2O): 7 cmH2O  Oxygen Concentration (%): 40 %  Resp: 20  Date 10/16/17 0700 - 10/17/17 0659   Shift 6427-3208 8109-5339 3259-5087 24 Hour Total   I  N  T  A  K  E   I.V. 114.6   114.6    NG/   400    Enteral 240   240    Shift Total  (mL/kg) 754.6  (8.34)   754.6  (8.34)   O  U  T  P  U  T   Urine 575   575    Shift Total  (mL/kg) 575  (6.35)   575  (6.35)   Weight (kg) 90.5 90.5 90.5 90.5         Past Medical History:   Diagnosis Date     AI (aortic insufficiency)      Aortic root dilatation (H)      AR (aortic regurgitation)     severe     Cardiomyopathy (H)      CHF (congestive heart failure), NYHA class II (H)      COPD, mild (H)     spirometry 12/16     Heart murmur      Hyperlipidemia LDL goal <70 10/31/2010     Hypertension goal BP (blood pressure) < 140/90      Inguinal hernia      left lung nodule  1/29/2008     Major depressive disorder, single episode, moderate (H)      Psoriasis childhood     Tobacco use disorder        Past Surgical History:   Procedure Laterality Date     ARTHROSCOPY KNEE INCISION AND DRAINAGE Left 10/8/2017    Procedure: ARTHROSCOPY KNEE INCISION AND DRAINAGE;  Arthroscopic Incision and Drainage of Left Knee;  Surgeon:  Lucretia Munoz MD;  Location: [facility] OR     HC SHOULDER ARTHROSCOPY, DX  2001    Arthroscopy, Shoulder RT Dr OSIRIS Andersen     HC SHOULDER ARTHROSCOPY, DX  1/04    LT arthroscopy Dr OSIRIS Andersen     HERNIA REPAIR, UMBILICAL  1/08    incarcerated - dr rockwell     HERNIORRHAPHY INGUINAL  12/21/2012    HERNIORRHAPHY INGUINAL;  Right Inguinal Hernia Repair with mesh ;  Surgeon: Mello Rockwell MD;  Location: [facility] OR     REPLACE VALVE AORTIC N/A 5/17/2016    REPLACE VALVE AORTIC - Dr Bowers     ROTATOR CUFF REPAIR RT/LT  11/10    Rt arthroscopy - Dr OSIRIS Andersen     SURGICAL HISTORY OF -   5/16    AVR, severe AR, aortic root repair       Physical Exam    General: Intubated, lightly sedated, in no distress   Neck: Supple, no tracheal deviation  Cardiovascular: Regular tachy, nom/r/g  Pumonary: Non labored breathing on MV.  CTAB   Abdomen: Soft, non distended, non tender.   Ext: W/o edema, wwp  Neuro: Small sluggishly reactive pupil bilaterally approximately 2mm - Left oculocephalic & LT Corneal abscent with mild intermittent nystagmus to the RT side     Lab Results   Component Value Date    WBC 10.2 10/22/2017    HGB 7.4 (L) 10/22/2017    HCT 23.3 (L) 10/22/2017     10/22/2017     (H) 10/22/2017    POTASSIUM 3.5 10/22/2017    CHLORIDE 119 (H) 10/22/2017    CO2 23 10/22/2017    BUN 46 (H) 10/22/2017    CR 1.86 (H) 10/22/2017     (H) 10/22/2017     (H) 10/21/2017    DD 1.7 (H) 04/25/2016    NTBNPI 2768 (H) 04/25/2016    NTBNP 1302 (H) 06/03/2016    TROPONIN <0.07 12/05/2005    TROPI 0.634 (HH) 10/08/2017    AST 52 (H) 10/21/2017    ALT 35 10/21/2017    ALKPHOS 85 10/06/2017    BILITOTAL 1.5 (H) 10/06/2017    INR 1.03 10/08/2017           dexmedetomidine 0.2 mcg/kg/hr (10/22/17 0800)     - MEDICATION INSTRUCTIONS -       IV fluid REPLACEMENT ONLY       Reason beta blocker order not selected           Assessment and Plan: Mr. Cricket Miguel is a 62 year old gentleman w/ h/o AVR and ascending aortic  repair in 5/2016 admitted with acute mental status changes found to have large moises-aortic fluid collection and multiple strokes likely septic emboli.  Concern for moises-infarct edema leading to expansion of the cerebellar infarct.    Neuro:     Multifocal CVA: L cerebellar, L MCA, and R occipital regions.  Concern for septic emboli.  Small microhemorrhages were observed.  Progressive edema noted.  Neurosurgery and neurocritical care are following.  Angio showed no evidence of mycotic aneurysms.  Neuro exam stable.    ASA 81mg    Acetaminophen, Ibuprofen, Fentanyl prn    Precedex gtt for anxiety on ventilator  Resp:     Intubated and mechanically ventilated.    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  1st degree AVB.  Plan for surgery no earlier than the week of Oct 23rd.     Tagged WBC scan showed likely infectious etiology.    Tachycardia    Metoprolol started 10/19  GI/FEN:     Post pyloric feeding tube in place, keep infusing at goal rate.  Renal:     Monitor electrolytes, Cr, and urine output     Lytes goals K>4 and Mag>2    JOSE:     Monitor Urine Output. Cr     Hypernatremia: Treated with hypertonic saline but not normalizing without free water.      Continue free water (100mL q1hour) with conservative Na reduction goals of 5 meq per 24hrs. Goal Na+ 145-155 per [facility]  Monitor Q2 Na.  Endo:    Glucose checks, SSI to serum glucose >80, <180 mg/dL  ID:     Sepsis: MSSA bacteremia with likely source of the L knee septic arthritis. Knee is s/p arthroscopic irrigation and debridement 10/8. Renal function is improving slowly.  Otherwise hemodynamically stable without pressors.      Nafcillin 2g IV Q4 hours and Levofloxacin 750mg IV Q48 hours, Rifampin 300mg BID. Gentamicin IV daily. Blood cultures continue to be negative since 10/12. Nystatin (oral candidiasis)     PET scan 10/19 revealed likely  infectious fluid around ascending aorta, plan to go to OR week of 10/23    Tooth Remnant, left lower canine    OMF o/b, rec continuing abx and outpatient f/u    Awaiting formal CT neck/head read    Monitor WBC daily   Heme:     Hgb stable, no concerns at this time  Prophylaxis: SCDs, Famotidine  Lines:      CVC 15 days    Espinoza 15 days    A-line 15 days    Rectal Tube 3 days      Dispo: Continue ICU care. Continue to talk with family regarding plan   Code Status: Full Code    Chris Behrens M.D.  Cardiovascular Anesthesiology Fellow    Patient seen with Dr. Matute staff Intensivist

## 2017-10-22 NOTE — PLAN OF CARE
Problem: Patient Care Overview  Goal: Plan of Care/Patient Progress Review  Outcome: No Change  Status  D/I: Patient on unit 4A Surgical/Neuro ICU admit 10/6 large moises-aortic fluid collection and multiple strokes likely septic emboli.    Neuro- withdraws on LLE only, pupils 3 mm equal and reactive, opens eyes to voice, does not follow commands, precedex continued at 0.2 overnight  CV- sinus rhythm, HR 70-90, -130/50-60   Pulm- PS 7/5 all night, minimal clear/white thick secretions, coughs independently    GI- TF @ 50 (goal), free water flush 75 ml/hr, rectal tube w/ watery brown stool   - garza with 100-150 ml/hr   Skin- no new issues  Social-  Family at bedside and updated.  See flow sheets for further interventions and assessments.  P: Continue to monitor pt closely, Notify MD of changes/concerns

## 2017-10-22 NOTE — PROGRESS NOTES
CVTS PROGRESS NOTE  10/22/2017  Attending: Ramesh Kuo, *    S:   No acute issues overnight. Continuing with pressure support on vent. CT scan obtained yesterday to evaluate possible infection in neck    O:   Vitals:    10/22/17 0600 10/22/17 0700 10/22/17 0800 10/22/17 0830   BP:       BP Location:       Pulse:       Resp: 19 20 22    Temp: 99.3  F (37.4  C) 99.7  F (37.6  C) 99.9  F (37.7  C) 100.2  F (37.9  C)   TempSrc:   Bladder Axillary   SpO2: 99% 98% 99% 99%   Weight:       Height:         Vitals:    10/20/17 0400 10/21/17 0400 10/21/17 2000   Weight: 93.2 kg (205 lb 7.5 oz) 96.8 kg (213 lb 6.5 oz) 96.4 kg (212 lb 8.4 oz)           Intake/Output Summary (Last 24 hours) at 10/22/17 0920  Last data filed at 10/22/17 0800   Gross per 24 hour   Intake           3855.4 ml   Output             3045 ml   Net            810.4 ml       Ventilation Mode: CPAP/PS  FiO2 (%): 40 %  PEEP (cm H2O): 5 cmH2O  Pressure Support (cm H2O): 7 cmH2O  Oxygen Concentration (%): 40 %  Resp: 22    Recent Labs  Lab 10/22/17  0336 10/21/17  0335 10/20/17  0408 10/19/17  0354   PH 7.42 7.41 7.40 7.49*   PCO2 36 37 38 30*   PO2 148* 140* 94 144*   HCO3 23 23 24 23   O2PER 40 40 40 40       MAPs:   General: Intubated, lightly sedated, does not follow commands but tracking and trying to mouth words.opens eyes spontaneously.   Neck: Supple, no tracheal deviation, mild left jaw swelling  Cardiovascular: RRR  Pumonary: Non labored breathing on MV  Abdomen: Soft, non distended, non tender  Ext: Minimal edema, warm  Neuro: Does not follow commands      Labs:  [unfilled]  GLUCOSE:     Recent Labs  Lab 10/22/17  0822 10/22/17  0339 10/22/17  0338 10/22/17  0015 10/21/17  2024 10/21/17  2023 10/21/17  1605 10/21/17  1419 10/21/17  1335  10/21/17  0335  10/20/17  2225  10/20/17  1703   GLC  --   --  147*  --  151*  --   --  157*  --   --  164*  --  187*  --  139*   * 138*  --  151*  --  133* 141*  --  123*  < >  --   < >  --   < >   --    < > = values in this interval not displayed.      Imaging:  No recent imaging      A/P:   Cricket Miguel is a 62 year old with history of AVR and ascending aortic repair in May 2016 presented on 10/6 with acute mental status changes found to have large moises-aortic fluid collection and multiple strokes likely septic emboli.    Neuro: Tylenol scheduled for fevers,continue neuro checks Q2H,  precedex and wake up as able. Okay to use ibuprofen for fevers if needed per neuro.   Resp: Inability to protect airway requiring mechanical ventilation, on minimal vent settings, did well with pressure support. Will continue to do trials today. Will obtain a NIF and VC again today. Will evaluate for possible extubation if no intervention from ENT.    CV: Goal normotension per neurology/neurosurgery. ASA. Low dose metoprolol BID for intermittent SVT  GI/FEN: Tube feeds to goal, continue free water for hypernatremia (goal 145-155, go slow per neuro), will recheck BMPs Q6. +Bowel function  Renal: JOSE non-oliguric, clinically euvolemic   Endo: SSI  ID: Continue current antibiotic regimen pending ID recommendations (Nafcillin, levofloxacin, gentamycin), daily blood cultures negative since 10/13. Added rifampin to abx per ID. Stopped daily cultures since they have been negative. WBC slowly decreasing. PET scan done showed increased uptake around the heart and knee. OMFS consulted for tooth infection as per Dentistry. CT neck obtained. Will wait to see if ENT needed.   Heme: No bleeding concerns at this time, hold anticoagulation per neuro and neurosurgery  Prophylaxis: H2 blocker, no chemical dvt ppx due to evolving stroke  Dispo: CVICU      Discussed with CVTS fellow.   Staff surgeons have been informed of changes through both  verbal and written communication.        Rula Contreras  General Surgery PGY 4  Cardiothoracic Surgery  [phone]

## 2017-10-22 NOTE — PROGRESS NOTES
Status  D/I: Patient on unit 4A Surgical/Neuro ICU s/p large moises-aortic fluid collection and multiple strokes likely septic emboli.    Neuro- withdraws on LLE, 1x withdrew on LUE, could not repeat later, opens eyes to voice -turns head toward voice, but doesn't follow commands, pupils equal and reactive,  dex @ 0.2 mcg/kg/min stopped throughout the day, but restarted with restlessness at night.  With family unsure if pt blinking eyes to command or nodding yes, shaking head no.  CV-  SR, BP and HR increasing by end of night, precedex restarted, scheduled tylenol, Tmax 100.2ax  Pulm- PS 7/5, RR 15-20, lungs clear to coarse- diminished bases, small amount of white secretions from ETT  GI-  TF @ 50 (goal), Na 147-  free water down to 50/hr, rectal tube- watery stool   - Espinoza, UOP > 100 cc/hr   Skin-  no new issues    Social-  Family at bedside and updated-  Multiple updates throughout the day for many, many questions -MDs and multiple teams updating family.  See flow sheets for further interventions and assessments.  P: Continue to monitor pt closely, Notify MD of changes/concerns,  Possible trach mon and possible procedures to correct infection in his heart tues

## 2017-10-23 ENCOUNTER — APPOINTMENT (OUTPATIENT)
Dept: MRI IMAGING | Facility: CLINIC | Age: 62
DRG: 004 | End: 2017-10-23
Attending: PSYCHIATRY & NEUROLOGY
Payer: COMMERCIAL

## 2017-10-23 ENCOUNTER — APPOINTMENT (OUTPATIENT)
Dept: PHYSICAL THERAPY | Facility: CLINIC | Age: 62
DRG: 004 | End: 2017-10-23
Attending: PSYCHIATRY & NEUROLOGY
Payer: COMMERCIAL

## 2017-10-23 ENCOUNTER — APPOINTMENT (OUTPATIENT)
Dept: GENERAL RADIOLOGY | Facility: CLINIC | Age: 62
DRG: 004 | End: 2017-10-23
Attending: PSYCHIATRY & NEUROLOGY
Payer: COMMERCIAL

## 2017-10-23 LAB
ANION GAP SERPL CALCULATED.3IONS-SCNC: 5 MMOL/L (ref 3–14)
ANION GAP SERPL CALCULATED.3IONS-SCNC: 5 MMOL/L (ref 3–14)
ANION GAP SERPL CALCULATED.3IONS-SCNC: 7 MMOL/L (ref 3–14)
ANION GAP SERPL CALCULATED.3IONS-SCNC: 8 MMOL/L (ref 3–14)
BACTERIA SPEC CULT: NO GROWTH
BACTERIA SPEC CULT: NO GROWTH
BASE DEFICIT BLDA-SCNC: 0.4 MMOL/L
BUN SERPL-MCNC: 38 MG/DL (ref 7–30)
BUN SERPL-MCNC: 39 MG/DL (ref 7–30)
BUN SERPL-MCNC: 39 MG/DL (ref 7–30)
BUN SERPL-MCNC: 40 MG/DL (ref 7–30)
CALCIUM SERPL-MCNC: 7.2 MG/DL (ref 8.5–10.1)
CALCIUM SERPL-MCNC: 7.3 MG/DL (ref 8.5–10.1)
CALCIUM SERPL-MCNC: 7.6 MG/DL (ref 8.5–10.1)
CALCIUM SERPL-MCNC: 7.6 MG/DL (ref 8.5–10.1)
CHLORIDE SERPL-SCNC: 115 MMOL/L (ref 94–109)
CHLORIDE SERPL-SCNC: 118 MMOL/L (ref 94–109)
CHLORIDE SERPL-SCNC: 118 MMOL/L (ref 94–109)
CHLORIDE SERPL-SCNC: 120 MMOL/L (ref 94–109)
CO2 SERPL-SCNC: 23 MMOL/L (ref 20–32)
CO2 SERPL-SCNC: 24 MMOL/L (ref 20–32)
CO2 SERPL-SCNC: 24 MMOL/L (ref 20–32)
CO2 SERPL-SCNC: 26 MMOL/L (ref 20–32)
CREAT SERPL-MCNC: 1.58 MG/DL (ref 0.66–1.25)
CREAT SERPL-MCNC: 1.62 MG/DL (ref 0.66–1.25)
CREAT SERPL-MCNC: 1.68 MG/DL (ref 0.66–1.25)
CREAT SERPL-MCNC: 1.73 MG/DL (ref 0.66–1.25)
CRP SERPL-MCNC: 30 MG/L (ref 0–8)
ERYTHROCYTE [DISTWIDTH] IN BLOOD BY AUTOMATED COUNT: 19.3 % (ref 10–15)
ERYTHROCYTE [SEDIMENTATION RATE] IN BLOOD BY WESTERGREN METHOD: 122 MM/H (ref 0–20)
GFR SERPL CREATININE-BSD FRML MDRD: 40 ML/MIN/1.7M2
GFR SERPL CREATININE-BSD FRML MDRD: 41 ML/MIN/1.7M2
GFR SERPL CREATININE-BSD FRML MDRD: 43 ML/MIN/1.7M2
GFR SERPL CREATININE-BSD FRML MDRD: 44 ML/MIN/1.7M2
GLUCOSE BLDC GLUCOMTR-MCNC: 119 MG/DL (ref 70–99)
GLUCOSE BLDC GLUCOMTR-MCNC: 131 MG/DL (ref 70–99)
GLUCOSE BLDC GLUCOMTR-MCNC: 133 MG/DL (ref 70–99)
GLUCOSE BLDC GLUCOMTR-MCNC: 135 MG/DL (ref 70–99)
GLUCOSE BLDC GLUCOMTR-MCNC: 137 MG/DL (ref 70–99)
GLUCOSE BLDC GLUCOMTR-MCNC: 150 MG/DL (ref 70–99)
GLUCOSE SERPL-MCNC: 135 MG/DL (ref 70–99)
GLUCOSE SERPL-MCNC: 149 MG/DL (ref 70–99)
GLUCOSE SERPL-MCNC: 153 MG/DL (ref 70–99)
GLUCOSE SERPL-MCNC: 155 MG/DL (ref 70–99)
HCO3 BLD-SCNC: 24 MMOL/L (ref 21–28)
HCT VFR BLD AUTO: 22.9 % (ref 40–53)
HGB BLD-MCNC: 7.3 G/DL (ref 13.3–17.7)
MAGNESIUM SERPL-MCNC: 2.5 MG/DL (ref 1.6–2.3)
MCH RBC QN AUTO: 30.4 PG (ref 26.5–33)
MCHC RBC AUTO-ENTMCNC: 31.9 G/DL (ref 31.5–36.5)
MCV RBC AUTO: 95 FL (ref 78–100)
O2/TOTAL GAS SETTING VFR VENT: 35 %
OSMOLALITY SERPL: 313 MMOL/KG (ref 280–301)
OSMOLALITY SERPL: 316 MMOL/KG (ref 280–301)
OSMOLALITY SERPL: 317 MMOL/KG (ref 280–301)
OSMOLALITY SERPL: 319 MMOL/KG (ref 280–301)
PCO2 BLD: 37 MM HG (ref 35–45)
PH BLD: 7.43 PH (ref 7.35–7.45)
PHOSPHATE SERPL-MCNC: 3.8 MG/DL (ref 2.5–4.5)
PLATELET # BLD AUTO: 379 10E9/L (ref 150–450)
PO2 BLD: 128 MM HG (ref 80–105)
POTASSIUM SERPL-SCNC: 3.5 MMOL/L (ref 3.4–5.3)
POTASSIUM SERPL-SCNC: 3.5 MMOL/L (ref 3.4–5.3)
POTASSIUM SERPL-SCNC: 3.8 MMOL/L (ref 3.4–5.3)
POTASSIUM SERPL-SCNC: 4.3 MMOL/L (ref 3.4–5.3)
PREALB SERPL IA-MCNC: 27 MG/DL (ref 15–45)
RBC # BLD AUTO: 2.4 10E12/L (ref 4.4–5.9)
SODIUM SERPL-SCNC: 146 MMOL/L (ref 133–144)
SODIUM SERPL-SCNC: 147 MMOL/L (ref 133–144)
SODIUM SERPL-SCNC: 149 MMOL/L (ref 133–144)
SODIUM SERPL-SCNC: 151 MMOL/L (ref 133–144)
SPECIMEN SOURCE: NORMAL
SPECIMEN SOURCE: NORMAL
WBC # BLD AUTO: 9.2 10E9/L (ref 4–11)

## 2017-10-23 PROCEDURE — S0032 INJECTION, NAFCILLIN SODIUM: HCPCS | Performed by: STUDENT IN AN ORGANIZED HEALTH CARE EDUCATION/TRAINING PROGRAM

## 2017-10-23 PROCEDURE — 25000125 ZZHC RX 250: Performed by: STUDENT IN AN ORGANIZED HEALTH CARE EDUCATION/TRAINING PROGRAM

## 2017-10-23 PROCEDURE — 70551 MRI BRAIN STEM W/O DYE: CPT

## 2017-10-23 PROCEDURE — 20000004 ZZH R&B ICU UMMC

## 2017-10-23 PROCEDURE — 25000132 ZZH RX MED GY IP 250 OP 250 PS 637: Performed by: PHYSICIAN ASSISTANT

## 2017-10-23 PROCEDURE — 25000132 ZZH RX MED GY IP 250 OP 250 PS 637: Performed by: STUDENT IN AN ORGANIZED HEALTH CARE EDUCATION/TRAINING PROGRAM

## 2017-10-23 PROCEDURE — 25000132 ZZH RX MED GY IP 250 OP 250 PS 637: Performed by: THORACIC SURGERY (CARDIOTHORACIC VASCULAR SURGERY)

## 2017-10-23 PROCEDURE — 25000128 H RX IP 250 OP 636

## 2017-10-23 PROCEDURE — 83930 ASSAY OF BLOOD OSMOLALITY: CPT | Performed by: ORTHOPAEDIC SURGERY

## 2017-10-23 PROCEDURE — 85027 COMPLETE CBC AUTOMATED: CPT | Performed by: ORTHOPAEDIC SURGERY

## 2017-10-23 PROCEDURE — 80048 BASIC METABOLIC PNL TOTAL CA: CPT | Performed by: ORTHOPAEDIC SURGERY

## 2017-10-23 PROCEDURE — 40000275 ZZH STATISTIC RCP TIME EA 10 MIN

## 2017-10-23 PROCEDURE — 71010 XR CHEST PORT 1 VW: CPT

## 2017-10-23 PROCEDURE — 97110 THERAPEUTIC EXERCISES: CPT | Mod: GP | Performed by: REHABILITATION PRACTITIONER

## 2017-10-23 PROCEDURE — 86140 C-REACTIVE PROTEIN: CPT | Performed by: ORTHOPAEDIC SURGERY

## 2017-10-23 PROCEDURE — 99291 CRITICAL CARE FIRST HOUR: CPT | Mod: GC | Performed by: ANESTHESIOLOGY

## 2017-10-23 PROCEDURE — 85652 RBC SED RATE AUTOMATED: CPT | Performed by: ORTHOPAEDIC SURGERY

## 2017-10-23 PROCEDURE — 72148 MRI LUMBAR SPINE W/O DYE: CPT

## 2017-10-23 PROCEDURE — 25000132 ZZH RX MED GY IP 250 OP 250 PS 637: Performed by: SURGERY

## 2017-10-23 PROCEDURE — 40000193 ZZH STATISTIC PT WARD VISIT: Performed by: REHABILITATION PRACTITIONER

## 2017-10-23 PROCEDURE — 40000014 ZZH STATISTIC ARTERIAL MONITORING DAILY

## 2017-10-23 PROCEDURE — 83735 ASSAY OF MAGNESIUM: CPT | Performed by: ORTHOPAEDIC SURGERY

## 2017-10-23 PROCEDURE — 94003 VENT MGMT INPAT SUBQ DAY: CPT

## 2017-10-23 PROCEDURE — 40000281 ZZH STATISTIC TRANSPORT TIME EA 15 MIN

## 2017-10-23 PROCEDURE — 25000128 H RX IP 250 OP 636: Performed by: STUDENT IN AN ORGANIZED HEALTH CARE EDUCATION/TRAINING PROGRAM

## 2017-10-23 PROCEDURE — 72146 MRI CHEST SPINE W/O DYE: CPT

## 2017-10-23 PROCEDURE — 80048 BASIC METABOLIC PNL TOTAL CA: CPT | Performed by: STUDENT IN AN ORGANIZED HEALTH CARE EDUCATION/TRAINING PROGRAM

## 2017-10-23 PROCEDURE — 25000128 H RX IP 250 OP 636: Performed by: NEUROLOGICAL SURGERY

## 2017-10-23 PROCEDURE — 27210429 ZZH NUTRITION PRODUCT INTERMEDIATE LITER

## 2017-10-23 PROCEDURE — 84134 ASSAY OF PREALBUMIN: CPT | Performed by: ORTHOPAEDIC SURGERY

## 2017-10-23 PROCEDURE — 82803 BLOOD GASES ANY COMBINATION: CPT | Performed by: ORTHOPAEDIC SURGERY

## 2017-10-23 PROCEDURE — 72141 MRI NECK SPINE W/O DYE: CPT

## 2017-10-23 PROCEDURE — 27210913 ZZH NUTRITION PRODUCT INTERMEDIATE PACKET

## 2017-10-23 PROCEDURE — 83930 ASSAY OF BLOOD OSMOLALITY: CPT | Performed by: STUDENT IN AN ORGANIZED HEALTH CARE EDUCATION/TRAINING PROGRAM

## 2017-10-23 PROCEDURE — 84100 ASSAY OF PHOSPHORUS: CPT | Performed by: ORTHOPAEDIC SURGERY

## 2017-10-23 PROCEDURE — 00000146 ZZHCL STATISTIC GLUCOSE BY METER IP

## 2017-10-23 PROCEDURE — 25000128 H RX IP 250 OP 636: Performed by: THORACIC SURGERY (CARDIOTHORACIC VASCULAR SURGERY)

## 2017-10-23 RX ORDER — POTASSIUM CHLORIDE 14.9 MG/ML
20 INJECTION INTRAVENOUS ONCE
Status: COMPLETED | OUTPATIENT
Start: 2017-10-23 | End: 2017-10-23

## 2017-10-23 RX ADMIN — Medication 300 MG: at 20:19

## 2017-10-23 RX ADMIN — DEXMEDETOMIDINE HYDROCHLORIDE 0.2 MCG/KG/HR: 4 INJECTION, SOLUTION INTRAVENOUS at 15:09

## 2017-10-23 RX ADMIN — NYSTATIN 500000 UNITS: 100000 SUSPENSION ORAL at 20:19

## 2017-10-23 RX ADMIN — NYSTATIN 500000 UNITS: 100000 SUSPENSION ORAL at 07:58

## 2017-10-23 RX ADMIN — INSULIN ASPART 1 UNITS: 100 INJECTION, SOLUTION INTRAVENOUS; SUBCUTANEOUS at 00:05

## 2017-10-23 RX ADMIN — POTASSIUM CHLORIDE 20 MEQ: 1.5 POWDER, FOR SOLUTION ORAL at 04:38

## 2017-10-23 RX ADMIN — MULTIVITAMIN 15 ML: LIQUID ORAL at 07:58

## 2017-10-23 RX ADMIN — NAFCILLIN SODIUM: 2 INJECTION, POWDER, LYOPHILIZED, FOR SOLUTION INTRAMUSCULAR; INTRAVENOUS at 00:03

## 2017-10-23 RX ADMIN — METOPROLOL TARTRATE 12.5 MG: 100 TABLET, FILM COATED ORAL at 08:01

## 2017-10-23 RX ADMIN — NAFCILLIN SODIUM: 2 INJECTION, POWDER, LYOPHILIZED, FOR SOLUTION INTRAMUSCULAR; INTRAVENOUS at 11:57

## 2017-10-23 RX ADMIN — FENTANYL CITRATE 50 MCG: 50 INJECTION, SOLUTION INTRAMUSCULAR; INTRAVENOUS at 19:54

## 2017-10-23 RX ADMIN — POTASSIUM CHLORIDE 20 MEQ: 1.5 POWDER, FOR SOLUTION ORAL at 22:01

## 2017-10-23 RX ADMIN — GENTAMICIN SULFATE 80 MG: 80 INJECTION, SOLUTION INTRAVENOUS at 08:23

## 2017-10-23 RX ADMIN — Medication 300 MG: at 08:01

## 2017-10-23 RX ADMIN — FAMOTIDINE 20 MG: 40 POWDER, FOR SUSPENSION ORAL at 08:01

## 2017-10-23 RX ADMIN — NAFCILLIN SODIUM: 2 INJECTION, POWDER, LYOPHILIZED, FOR SOLUTION INTRAMUSCULAR; INTRAVENOUS at 17:14

## 2017-10-23 RX ADMIN — METOPROLOL TARTRATE 12.5 MG: 100 TABLET, FILM COATED ORAL at 20:19

## 2017-10-23 RX ADMIN — ASPIRIN 81 MG CHEWABLE TABLET 81 MG: 81 TABLET CHEWABLE at 08:22

## 2017-10-23 RX ADMIN — ACETAMINOPHEN 975 MG: 325 SOLUTION ORAL at 00:03

## 2017-10-23 RX ADMIN — ACETAMINOPHEN 975 MG: 325 SOLUTION ORAL at 17:14

## 2017-10-23 RX ADMIN — HYDRALAZINE HYDROCHLORIDE 10 MG: 20 INJECTION INTRAMUSCULAR; INTRAVENOUS at 07:01

## 2017-10-23 RX ADMIN — POTASSIUM CHLORIDE 20 MEQ: 200 INJECTION, SOLUTION INTRAVENOUS at 14:54

## 2017-10-23 RX ADMIN — NYSTATIN 500000 UNITS: 100000 SUSPENSION ORAL at 14:54

## 2017-10-23 RX ADMIN — Medication 1 PACKET: at 07:58

## 2017-10-23 RX ADMIN — NAFCILLIN SODIUM: 2 INJECTION, POWDER, LYOPHILIZED, FOR SOLUTION INTRAMUSCULAR; INTRAVENOUS at 07:58

## 2017-10-23 RX ADMIN — NYSTATIN 500000 UNITS: 100000 SUSPENSION ORAL at 01:49

## 2017-10-23 RX ADMIN — ACETAMINOPHEN 975 MG: 325 SOLUTION ORAL at 07:58

## 2017-10-23 RX ADMIN — NAFCILLIN SODIUM: 2 INJECTION, POWDER, LYOPHILIZED, FOR SOLUTION INTRAMUSCULAR; INTRAVENOUS at 20:18

## 2017-10-23 RX ADMIN — DEXMEDETOMIDINE HYDROCHLORIDE 0.2 MCG/KG/HR: 4 INJECTION, SOLUTION INTRAVENOUS at 19:57

## 2017-10-23 RX ADMIN — NAFCILLIN SODIUM: 2 INJECTION, POWDER, LYOPHILIZED, FOR SOLUTION INTRAMUSCULAR; INTRAVENOUS at 03:54

## 2017-10-23 NOTE — PROGRESS NOTES
Neuroscience Intensive Care Progress Note    10/23/2017  Mr. Cricket Miguel is a 62 year old gentleman w/ h/o AVR and ascending aortic repair in 2016, 1st degree AVB present, observing with serial EKGs. admitted with acute mental status changes found to have large moises-aortic fluid collection and multiple strokes likely septic emboli. The strokes involved the L cerebellar, L MCA, and R occipital regions. Small microhemorrhages were observed.  Concern for moises-infarct edema leading to expansion of the cerebellar infarct. Cerebral angiogram showed no evidence of mycotic aneurysm. NeuroICU and Neurosurgery following.  Hemodynamically stable off pressors.  Blood cultures growing MSSA now clearing intermittently.  Likely source of L knee septic arthritis which underwent arthroscopic irrigation and debridement 10/8.  On nafcillin/levaquin/gent - appreciate ID assistance.  OJSE present but improving.  Current plan for non-contrast CT abdomen early this week to evaluate prior signs of R renal pyelonephritis. Tagged WBC scan later this week.  Possible surgery as early as the week of Oct 23rd.  Attempting to lighten sedation and attempt extubation this week though he has been slow to awaken from sedation likely due to his inflammatory state in the setting of multiple strokes.  He is also increasingly febrile over the last two days.  His antipyretic regimen was intensified today.  Plan for family meeting this week after the tagged WBC scan to discuss treatment options.    24 hour events:  Pt is off sedation, open his eyes spontaneously, Small sluggishly reactive pupil bilaterally approximately 2mm - Left oculocephalic & LT Corneal abscent with mild intermittent nystagmus to the RT side , ON PRESURE SUPPORT.    24 Hour Vital Signs Summary:  Temperatures:  Current - Temp: 101.1  F (38.4  C); Max - Temp  Av.8  F (38.2  C)  Min: 99.9  F (37.7  C)  Max: 101.5  F (38.6  C)  Respiration range: Resp  Av.4  Min: 16  Max:  20  Pulse range: No Data Recorded  Blood pressure range: Systolic (24hrs), Av , Min:94 , Max:175   ; Diastolic (24hrs), Av, Min:57, Max:98    Pulse oximetry range: SpO2  Av.2 %  Min: 93 %  Max: 97 %    Ventilator Settings  Ventilation Mode: CPAP/PS  FiO2 (%): 35 %  PEEP (cm H2O): 5 cmH2O  Pressure Support (cm H2O): 7 cmH2O  Oxygen Concentration (%): 35 %  Resp: 16      Intake/Output Summary (Last 24 hours) at 10/13/17 1808  Last data filed at 10/13/17 1700   Gross per 24 hour   Intake          3939.68 ml   Output             3025 ml   Net           914.68 ml       Arterial Line BP: ()/(49-92) 107/50  MAP:  [60 mmHg-123 mmHg] 71 mmHg  BP - Mean:  [73-81] 77  CVP:  [5 mmHg] 5 mmHg    Current Medications:    acetaminophen  975 mg Oral or Feeding Tube Q8H     famotidine  20 mg Oral or Feeding Tube Q24H     protein modular  1 packet Per Feeding Tube Daily     insulin aspart  1-6 Units Subcutaneous Q4H     metoprolol  12.5 mg Oral BID     IV infusion builder WITH LARGE additive list   Intravenous Q4H     rifampin  300 mg Oral or Feeding Tube BID     gentamicin  80 mg Intravenous Q24H     nystatin  500,000 Units Swish & Spit Q6H     aspirin  81 mg Oral or Feeding Tube Daily     levofloxacin  750 mg Intravenous Q48H     influenza quadrivalent (PF) vacc age 3 yrs and older  0.5 mL Intramuscular Prior to discharge     pneumococcal vaccine  0.5 mL Intramuscular Prior to discharge     sodium chloride (PF)  3 mL Intravenous Q8H     multivitamins with minerals  15 mL Per Feeding Tube Daily     sodium chloride (PF)  3 mL Intracatheter Q8H       PRN Medications:  glucose **OR** dextrose **OR** glucagon, lidocaine 4%, heparin lock flush, polyethylene glycol, senna-docusate, lidocaine BUFFERED 1 %, ondansetron **OR** ondansetron, hydrALAZINE, sodium chloride (PF), - MEDICATION INSTRUCTIONS -, prochlorperazine **OR** prochlorperazine, naloxone, IV fluid REPLACEMENT ONLY, lidocaine (buffered or not buffered),  "lidocaine 4%, Reason beta blocker order not selected, insulin aspart, albuterol, albuterol, fentaNYL, potassium chloride, potassium chloride, potassium chloride, potassium chloride with lidocaine, magnesium sulfate, magnesium sulfate, potassium phosphate (KPHOS) in D5W IV, potassium phosphate (KPHOS) in D5W IV, potassium phosphate (KPHOS) in D5W IV, potassium phosphate (KPHOS) in D5W IV, potassium phosphate (KPHOS) in D5W IV, diphenhydrAMINE **OR** diphenhydrAMINE    Infusions:    dexmedetomidine 0.2 mcg/kg/hr (10/23/17 1509)     - MEDICATION INSTRUCTIONS -       IV fluid REPLACEMENT ONLY       Reason beta blocker order not selected         Allergies   Allergen Reactions     Lisinopril Swelling     One-sided facial swelling after being on lisinopril/HCTZ for one week.        Physical Examination:  /65 (BP Location: Left arm)  Pulse 82  Temp 98.6  F (37  C) (Axillary)  Resp 16  Ht 1.727 m (5' 8\")  Wt 95.3 kg (210 lb 1.6 oz)  SpO2 99%  BMI 31.95 kg/m2  Intubated, on sedation. 2mm sluggish pupil - no corneal response on left - brisk response on right. Left oculocephalic absent. Not withdrawing with painful stimuli. Grimace to noxious stimuli, LT UL withdraw intermittentily.      Labs/Studies:  Recent Labs   Lab Test  10/13/17   1553  10/13/17   1406  10/13/17   1204   10/13/17   0357   10/12/17   0446   10/11/17   0312   NA  165*  167*  166*   < >  169*  169*   < >  167*  167*   < >  162*   POTASSIUM  4.1  4.2  4.5   < >  4.5   < >  4.2   --   3.8   CHLORIDE  133*  135*  134*   < >  136*   < >  134*  >125*   --   130*   CO2  27  27  26   < >  26   < >  24   --   24   ANIONGAP  5  6  6   < >  7   < >  9   --   8   GLC  155*  188*  176*   < >  183*   < >  209*   --   201*   BUN  79*  81*  82*   < >  81*   < >  74*   --   60*   CR  3.20*  3.22*  3.16*   < >  3.31*   < >  3.31*   --   3.35*   IZABELLA  7.9*  7.8*  7.7*   < >  7.8*   < >  7.8*   --   8.1*   WBC   --    --    --    --   15.7*   --   11.5*   --   " 13.4*   RBC   --    --    --    --   3.62*   --   4.08*   --   4.45   HGB   --    --    --    --   10.7*   --   12.0*   --   13.4   PLT   --    --    --    --   322   --   266   --   238    < > = values in this interval not displayed.       Recent Labs   Lab Test  10/08/17   0328  10/06/17   1322  10/06/17   1020  05/18/16   0435   INR  1.03  1.25*  1.23*  1.28*   PTT   --   32  32  35           Recent Labs  Lab 10/23/17  0341 10/22/17  0336 10/21/17  0335 10/20/17  0408   PH 7.43 7.42 7.41 7.40   PCO2 37 36 37 38   PO2 128* 148* 140* 94   HCO3 24 23 23 24   O2PER 35 40 40 40           Imaging:  Reviewed     Assessment/Plan  Mr. Cricket Miguel is a 62 year old gentleman w/ h/o AVR and ascending aortic repair in 5/2016, 1st degree AVB present, observing with serial EKGs. admitted with acute mental status changes found to have large moises-aortic fluid collection and multiple strokes likely septic emboli. The strokes involved the L cerebellar, L MCA, and R occipital regions. Small microhemorrhages were observed.  Concern for moises-infarct edema leading to expansion of the cerebellar infarct. Cerebral angiogram showed no evidence of mycotic aneurysm. NeuroICU and Neurosurgery following.  Hemodynamically stable off pressors.  Blood cultures growing MSSA now clearing intermittently.  Likely source of L knee septic arthritis which underwent arthroscopic irrigation and debridement 10/8.  On nafcillin/levaquin/gent - appreciate ID assistance.  JOSE present but improving.  Current plan for non-contrast CT abdomen early this week to evaluate prior signs of R renal pyelonephritis. Tagged WBC scan later this week.  Possible surgery as early as the week of Oct 23rd.  Attempting to lighten sedation and attempt extubation this week though he has been slow to awaken from sedation likely due to his inflammatory state in the setting of multiple strokes.  He is also increasingly febrile over the last two days.  His antipyretic regimen was  intensified today.  Plan for family meeting this week after the tagged WBC scan to discuss treatment options.    - Exam changes noted above  - ASA 81 mg   - off sedation to assess neurologic exam  - No need for hypertonics at this point   - Cerebral Angiogram normal  - Will recomemend MRI Brain, cervical & Thoracic & if possible lumbar without contrast to rule out any new lesion in the brain or the spinal cord before considering the wekness of the LT side is due to ICU Neuropathy.  - The patient from the neurology stand of point has good prognosis   - PET scan done showing 2 sites of infection in the the knee & the valvular lesion in the heart   - Family meeting was done & the family agreed with with proceeding with care & give a chance of extubation if tolerable, if not proceed with track & peg  - plan to repeat the family meeting tomorrow at the evening  - will continue to follow      Plan discussed with Dr. Genaro Baker  Neurocritical care Fellow  Pager [phone]

## 2017-10-23 NOTE — PLAN OF CARE
Problem: Patient Care Overview  Goal: Plan of Care/Patient Progress Review  Outcome: No Change  Status  D/I: Patient on unit 4A Surgical/Neuro ICU admit 10/6 large moises-aortic fluid collection and multiple strokes likely septic emboli.    Neuro- withdraws on LLE, pupils 3 mm equal and reactive, opens eyes to voice, does not follow commands, precedex continued at 0.3 overnight  CV- sinus rhythm, HR 70-90, -150, SBP goal < 140, Hydralazine PRN ordered, scheduled tylenol  Pulm- PS 35% 7/5 all night, minimal white thick secretions, coughing independently    GI- TF @ 50 (goal), free water flush 50 ml/hr, rectal tube w/ watery brown stool   - garza removed overnight due to leakage, condom catheter in place  Skin- no new issues  Social- Significant other at bedside and updated overnight. Son and daughter planning to visit this AM.  See flow sheets for further interventions and assessments.  P: Continue to monitor pt closely, Notify MD of changes/concerns

## 2017-10-23 NOTE — PROGRESS NOTES
CVTS PROGRESS NOTE  10/23/2017  Attending: Ramesh Kuo, *    S:   No acute issues overnight. Continuing with pressure support on vent. CT neck revealed cellulitis, no abscess    O:   Vitals:    10/23/17 0900 10/23/17 1000 10/23/17 1100 10/23/17 1200   BP: 98/62 97/63 101/68 107/65   BP Location:    Left arm   Pulse:       Resp: 17 18 17 16   Temp:    98.6  F (37  C)   TempSrc:    Axillary   SpO2: 99% 99% 100% 99%   Weight:       Height:         Vitals:    10/21/17 0400 10/21/17 2000 10/23/17 0400   Weight: 96.8 kg (213 lb 6.5 oz) 96.4 kg (212 lb 8.4 oz) 95.3 kg (210 lb 1.6 oz)             Intake/Output Summary (Last 24 hours) at 10/23/17 1245  Last data filed at 10/23/17 1200   Gross per 24 hour   Intake           3373.4 ml   Output             3105 ml   Net            268.4 ml         Ventilation Mode: CPAP/PS  FiO2 (%): 35 %  PEEP (cm H2O): 5 cmH2O  Pressure Support (cm H2O): 7 cmH2O  Oxygen Concentration (%): 35 %  Resp: 16    Recent Labs  Lab 10/23/17  0341 10/22/17  0336 10/21/17  0335 10/20/17  0408   PH 7.43 7.42 7.41 7.40   PCO2 37 36 37 38   PO2 128* 148* 140* 94   HCO3 24 23 23 24   O2PER 35 40 40 40       MAPs:   General: Intubated, lightly sedated, does not follow commands but tracking and trying to mouth words.opens eyes spontaneously.   Neck: Supple, no tracheal deviation, mild left jaw swelling  Cardiovascular: RRR  Pumonary: Non labored breathing on MV  Abdomen: Soft, non distended, non tender  Ext: Minimal edema, warm  Neuro: Does not follow commands      Labs:  [unfilled]  GLUCOSE:     Recent Labs  Lab 10/23/17  1159 10/23/17  1015 10/23/17  0809 10/23/17  0349 10/23/17  0341 10/23/17  0002 10/22/17  2109 10/22/17  2024 10/22/17  1710 10/22/17  1531  10/22/17  0338  10/21/17  2024   GLC  --  153*  --   --  149*  --  156*  --   --  146*  --  147*  --  151*   *  --  137* 135*  --  150*  --  152* 131*  --   < >  --   < >  --    < > = values in this interval not displayed.      Imaging:   No recent imaging      A/P:   Cricket Miguel is a 62 year old with history of AVR and ascending aortic repair in May 2016 presented on 10/6 with acute mental status changes found to have large moises-aortic fluid collection and multiple strokes likely septic emboli.    Neuro: Tylenol scheduled for fevers,continue neuro checks Q2H,  precedex and wake up as able. Okay to use ibuprofen for fevers if needed per neuro. Will ask neuroCC to see the patient today and evaluate for any repeat imaging if needed. We would also ask them to be present for a family care conference tomorrow.   Resp: Inability to protect airway requiring mechanical ventilation, on minimal vent settings, did well with pressure support. Will continue to do trials today.     CV: Goal normotension per neurology/neurosurgery. ASA. Low dose metoprolol BID for intermittent SVT  GI/FEN: Tube feeds to goal, continue free water for hypernatremia (goal 145-155, go slow per neuro), will recheck BMPs Q6. +Bowel function. Will increase free water for hypernatremia  Renal: JOSE non-oliguric, clinically euvolemic   Endo: SSI  ID: Continue current antibiotic regimen pending ID recommendations (Nafcillin, levofloxacin, gentamycin), daily blood cultures negative since 10/13. Added rifampin to abx per ID. Stopped daily cultures since they have been negative. WBC slowly decreasing. PET scan done showed increased uptake around the heart and knee. OMFS consulted for tooth infection as per Dentistry. CT neck shows no abscess, cellulitis  Heme: No bleeding concerns at this time, hold anticoagulation per neuro and neurosurgery  Prophylaxis: H2 blocker, no chemical dvt ppx due to evolving stroke  Dispo: CVICU      Discussed with CVTS fellow.   Staff surgeons have been informed of changes through both  verbal and written communication.        Rula Contreras  General Surgery PGY 4  Cardiothoracic Surgery  [phone]

## 2017-10-23 NOTE — PROGRESS NOTES
CV ICU Progress Note    POD: 15 Days Post-Op  LOS: 17    Admission History: Mr. Cricket Miguel is a 62 year old gentleman w/ h/o AVR and ascending aortic repair in 2016 admitted with acute mental status changes found to have large moises-aortic fluid collection and multiple strokes likely septic emboli.    S/Interval History: No acute events overnight. Patient mental status has not changed significantly.  Neuro re-consulted for input into neuro status pending family meeting 10/24/17.    O:    Temp: 98.6  F (37  C) Temp  Min: 98.1  F (36.7  C)  Max: 99.7  F (37.6  C)  Resp: 16 Resp  Min: 14  Max: 19  SpO2: 99 % SpO2  Min: 97 %  Max: 100 %    No Data Recorded  Heart Rate: 79 Heart Rate  Min: 71  Max: 102  BP: 107/65 Systolic (24hrs), Av , Min:97 , Max:163   Diastolic (24hrs), Av, Min:62, Max:81    Ventilation Mode: CPAP/PS  FiO2 (%): 35 %  PEEP (cm H2O): 5 cmH2O  Pressure Support (cm H2O): 7 cmH2O  Oxygen Concentration (%): 35 %  Resp: 16  Date 10/16/17 0700 - 10/17/17 0659   Shift 6995-2361 2619-5071 5768-1699 24 Hour Total   I  N  T  A  K  E   I.V. 114.6   114.6    NG/   400    Enteral 240   240    Shift Total  (mL/kg) 754.6  (8.34)   754.6  (8.34)   O  U  T  P  U  T   Urine 575   575    Shift Total  (mL/kg) 575  (6.35)   575  (6.35)   Weight (kg) 90.5 90.5 90.5 90.5         Past Medical History:   Diagnosis Date     AI (aortic insufficiency)      Aortic root dilatation (H)      AR (aortic regurgitation)     severe     Cardiomyopathy (H)      CHF (congestive heart failure), NYHA class II (H)      COPD, mild (H)     spirometry      Heart murmur      Hyperlipidemia LDL goal <70 10/31/2010     Hypertension goal BP (blood pressure) < 140/90      Inguinal hernia      left lung nodule  2008     Major depressive disorder, single episode, moderate (H)      Psoriasis childhood     Tobacco use disorder        Past Surgical History:   Procedure Laterality Date     ARTHROSCOPY KNEE INCISION AND DRAINAGE  Left 10/8/2017    Procedure: ARTHROSCOPY KNEE INCISION AND DRAINAGE;  Arthroscopic Incision and Drainage of Left Knee;  Surgeon: Lucretia Munoz MD;  Location: [facility] OR     HC SHOULDER ARTHROSCOPY, DX  2001    Arthroscopy, Shoulder RT Dr OSIRIS Andersen     HC SHOULDER ARTHROSCOPY, DX  1/04    LT arthroscopy Dr OSIRIS Andersen     HERNIA REPAIR, UMBILICAL  1/08    incarcerated - dr rockwell     HERNIORRHAPHY INGUINAL  12/21/2012    HERNIORRHAPHY INGUINAL;  Right Inguinal Hernia Repair with mesh ;  Surgeon: Mello Rockwell MD;  Location: [facility] OR     REPLACE VALVE AORTIC N/A 5/17/2016    REPLACE VALVE AORTIC - Dr Bowers     ROTATOR CUFF REPAIR RT/LT  11/10    Rt arthroscopy - Dr OSIRIS Andersen     SURGICAL HISTORY OF -   5/16    AVR, severe AR, aortic root repair       Physical Exam    General: Intubated, lightly sedated, in no distress   Neck: Supple, no tracheal deviation  Cardiovascular: Regular tachy, nom/r/g  Pumonary: Non labored breathing on MV.  CTAB   Abdomen: Soft, non distended, non tender.   Ext: W/o edema, wwp  Neuro: Small sluggishly reactive pupil bilaterally approximately 2mm - Left oculocephalic & LT Corneal abscent with mild intermittent nystagmus to the RT side     Lab Results   Component Value Date    WBC 9.2 10/23/2017    HGB 7.3 (L) 10/23/2017    HCT 22.9 (L) 10/23/2017     10/23/2017     (H) 10/23/2017    POTASSIUM 3.5 10/23/2017    CHLORIDE 120 (H) 10/23/2017    CO2 24 10/23/2017    BUN 39 (H) 10/23/2017    CR 1.68 (H) 10/23/2017     (H) 10/23/2017     (H) 10/23/2017    DD 1.7 (H) 04/25/2016    NTBNPI 2768 (H) 04/25/2016    NTBNP 1302 (H) 06/03/2016    TROPONIN <0.07 12/05/2005    TROPI 0.634 (HH) 10/08/2017    AST 52 (H) 10/21/2017    ALT 35 10/21/2017    ALKPHOS 85 10/06/2017    BILITOTAL 1.5 (H) 10/06/2017    INR 1.03 10/08/2017           dexmedetomidine 0.2 mcg/kg/hr (10/23/17 1509)     - MEDICATION INSTRUCTIONS -       IV fluid REPLACEMENT ONLY       Reason beta blocker  order not selected           Assessment and Plan: Mr. Cricket Miguel is a 62 year old gentleman w/ h/o AVR and ascending aortic repair in 5/2016 admitted with acute mental status changes found to have large moises-aortic fluid collection and multiple strokes likely septic emboli.  Concern for moises-infarct edema leading to expansion of the cerebellar infarct.    Neuro:     Multifocal CVA: L cerebellar, L MCA, and R occipital regions.  Concern for septic emboli.  Small microhemorrhages were observed.  Progressive edema noted.  Neurosurgery and neurocritical care are following.  Angio showed no evidence of mycotic aneurysms.  Neuro exam stable.    Neuro team has been re-consulted.  They requested MRI of brain and full spine.      ASA 81mg    Acetaminophen, Ibuprofen, Fentanyl prn    Precedex gtt for anxiety on ventilator  Resp:     Intubated and mechanically ventilated.    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  1st degree AVB.  Plan for surgery no earlier than the week of Oct 23rd.     Tagged WBC scan showed likely infectious etiology.    Tachycardia    Metoprolol started 10/19  GI/FEN:     Post pyloric feeding tube in place, keep infusing at goal rate.  Renal:     Monitor electrolytes, Cr, and urine output     Lytes goals K>4 and Mag>2    JOSE:     Monitor Urine Output. Cr     Hypernatremia: Treated with hypertonic saline but not normalizing without free water.      Continue free water (100mL q1hour) with conservative Na reduction goals of 5 meq per 24hrs. Goal Na+ 145-155 per [facility]  Monitor Q2 Na.  Endo:    Glucose checks, SSI to serum glucose >80, <180 mg/dL  ID:     Sepsis: MSSA bacteremia with likely source of the L knee septic arthritis. Knee is s/p arthroscopic irrigation and debridement 10/8. Renal function is improving slowly.  Otherwise hemodynamically stable without pressors.      Nafcillin 2g  IV Q4 hours and Levofloxacin 750mg IV Q48 hours, Rifampin 300mg BID. Gentamicin IV daily. Blood cultures continue to be negative since 10/12. Nystatin (oral candidiasis)     PET scan 10/19 revealed likely infectious fluid around ascending aorta, plan to go to OR week of 10/23    Tooth Remnant, left lower canine    OMF o/b, rec continuing abx and outpatient f/u    Awaiting formal CT neck/head read    Monitor WBC daily   Heme:     Hgb stable, no concerns at this time  Prophylaxis: SCDs, Famotidine  Lines:      CVC 16 days    Espinoza 16 days    A-line 16 days    Rectal Tube 4 days      Dispo: Continue ICU care. Plan for family meeting, possibly 10/24/17.  Awaiting Neuro's input into prognosis and risk of surgery.      Code Status: Full Code    Jeramie Orellana DO  Anesthesiology Resident  CVICU Service, *[id number]    Patient seen and staffed with attending.

## 2017-10-23 NOTE — PROGRESS NOTES
Care Coordinator Progress Note     Admission Date/Time:  10/6/2017  Attending MD:  Ramesh Kuo, *     Data  Chart reviewed, discussed with interdisciplinary team.   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    Assessment  Pt remain intubated and doing PS.  Team requested care conf. arranged for tomorrow, 10/24.    CC visited pt dtr Eloy and discussed about the care conf.  Eloy stated they are open all day tomorrow and can meet any time.  Dr. Bowers has clinic during day time and agreed to meet tomorrow, 10/24 at 5:00 pm.  Team members that have been informed about the care conf. are; CVTS (Dr. Bowers # [id number] and Dr. Kuo # [id number]), CVICU ( Dr. Wright # [id number]), bedside RN, charge nurse and Neuro ICU (  # [id number] and Dr. Baker # [id number]).  Neuro ICU team are busy at that time and will not be able to attend the care conf.  They will share their recommendation with the team in the morning and will update family too.  CC conformed care conf. time and date with pt dtrEloy and Meghna veliz.  They both agreed with the time and will inform the other family members.  CC will not be able to attend the care conf.  CC informed family that I am available tomorrow until 4:30 if they have any question.  Both Eloy and Meghna agreed.     Plan  Anticipated Discharge Date:  TBD.  Anticipated Discharge Plan:   TBD.  Care conf. arranged for tomorrow, 10/24 at 5:00 pm in 4 A conf. Room.  CC will cont to follow plan of care.      Matthew Keller, RN, PHN, BSN  4A and 4E/ ICU  Care Coordinator  Phone: [phone]  Pager: [phone]

## 2017-10-23 NOTE — PLAN OF CARE
Problem: Stroke (Ischemic) (Adult)  Goal: Signs and Symptoms of Listed Potential Problems Will be Absent, Minimized or Managed (Stroke)  Signs and symptoms of listed potential problems will be absent, minimized or managed by discharge/transition of care (reference Stroke (Ischemic) (Adult) CPG).   Outcome: No Change  Neuro: Waxes/wanes between somnolent & awake/alert. Tracks person in room, unable to track to command. Pt will intermittently seem like he is nodding but it is difficult to discern whether this is a reflexive movement or to command. Withdraws on BLEs, no movement on R side. Perrl-4mm.   Cardiac: Sinus 70-90s w/ rare PVCs. Goal SBP <140, no intervention needed throughout the day. Afebrile.   Resp: CPAP/PS 7/5 35% w/ volumes 400-600. Strong/productive cough. Thin/yellow secretions, mod amt from ETT & orally.   GI: NJ w/ TF at 50ml/hr (goal) w/ free water increased to 100ml/hr r/t hypernatremia. Rectal tube w/ watery/brown stool. Total of 800ml out today.   : Condom catheter replaced r/t leakage, UO 60-100ml/hr.  LADs: L Rad ART, positional at times. R PICC x3 - TKO + precedex (0.2-0.4mcg/kg/hr) & TKO for abx.  Integ: Stitches on L knee - RAMA, CDI.      Patient left for MRI of head/spine at 1800.   Plan for care conference tomorrow (10/24) at 1700 w/ CARDS Surg & NCC teams present. . Continue to monitor & update MD with changes.

## 2017-10-23 NOTE — PLAN OF CARE
Problem: Patient Care Overview  Goal: Plan of Care/Patient Progress Review  Discharge Planner PT   Patient plan for discharge: Unable to state  Current status: Pt is Dependent for all rolling and turning in bed. PT performed PROM BLE and BUE all joints and planes.   Barriers to return to prior living situation: Pt currently orally intubated on CPAP with 35% FiO2, PEEP of 5. Pt is currently dependent with all cares.  Recommendations for discharge: TCU  Rationale for recommendations: Pt currently dependent with all cares, did not follow commands to participate in PT session.        Entered by: Izabela Weber 10/23/2017 2:44 PM

## 2017-10-23 NOTE — PROGRESS NOTES
[facility] ID SERVICE: ONGOING CONSULTATION   Cricket Miguel : 1955 Sex: male:   Medical record number [id number] Attending Physician: Ramesh Kuo, *  Date of Service: 10/23/2017    ID PROBLEM LIST:  1. MSSA prosthetic aortic valve endocarditis with fluid collection noted in the aortic root and ascending thoracic aorta  - Blood cx positive 10/10, 10/11, 10/13   - Daily blood cx NGTD 10/14-10/21  2. Left knee pain status post aspriration 10/6; wbc count not consistent with septic arthritis (3,897) but cultures grew out MSSA. Status post I&D 10/8 with negative cultures.   3. Multiple bilateral CNS infarcts, presumed due to septic emboli  4. Ongoing critical illness with mechanical ventilation  5. Acute on CKD - stable       RECOMMENDATIONS:   1. Continue nafcillin 2g IV Q4 hours and Levofloxacin 750mg IV Q48 hours (added for better CNS penetration); will need a prolonged course of therapy.   2. Continue gentamicin through upcoming planned surgery.  3. Continue rifampin.    4. During upcoming surgery, please obtain intraoperative samples for gram stain/culture.  If viable organisms still present, will need to extend treatment course.      DISCUSSION:  63 yo male status post Bentall procedure in 2016 (graft replacement of the aortic valve, aortic root and ascending aorta) admitted 10/6 for evaluation of change in mental status found to have MSSA bacteremia, multiple bilateral CNS infarcts, fluid collection around the Ao root and ascending thoracic aorta, and a swollen left knee with MSSA on culture status post I&D. Although cultures have cleared, WBC is down and fevers improved, unlikely that he will clear this infection without surgical intervention, which is now planned for some time next week. Would continue current antimicrobials (rifampin, gent, nafcillin and levo) through time of surgery. Please obtain intraoperative samples for culture and gram stain. No new issues today.  "    Attestation:  This patient has been seen and evaluated by me, Marianna Vasquez MD. The plan was discussed with his partner and the primary team. I have reviewed today's vital signs, medications, labs and imaging.     Marianna Philip MD MPH  Infectious Diseases  Pager [phone]    _____________________________________________________________________________________  SUBJECTIVE:   Mr. Miguel is afebrile although he has had low grade temperature elevates, WBC count and CRP are trending down. His last positive blood culture was on 10/13/17. The nursing staff has charted waxing and waning consciousness. He has been having a moderate degree of thin yellow secretions.  He is making 50ml/hour of urine and creatinine is downtrending.  Neck CT showed only soft tissue swelling, no fluid collections.  OMFS is following.     ROS:  Unable to obtain given intubation/sedation    Allergies   Allergen Reactions     Lisinopril Swelling     One-sided facial swelling after being on lisinopril/HCTZ for one week.      CURRENT ANTI-INFECTIVES:   Nafcillin, start 10/7  Gentamicin, start 10/6  Levofloxacin, start 10/7  Rifampin, start 10/19    EXAMINATION: (Recommend ? 5 systems)   Vital Signs: /81  Pulse 82  Temp 98.1  F (36.7  C) (Axillary)  Resp 19  Ht 1.727 m (5' 8\")  Wt 95.3 kg (210 lb 1.6 oz)  SpO2 99%  BMI 31.95 kg/m2   GENERAL:  Sedated, intubated , occ grimaces   EYES: No icturus  ENT:  Intubated   LUNGS: Course BS bilaterally  CARDIOVASCULAR:  Regular rate and rhythm, + diffuse peripheral edema.  ABDOMEN:  Normal bowel sounds, soft, nontender. No appreciable hepatosplenomegaly  SKIN:  No acute rashes.    MS: Left knee with sutures in place, no warmth or erythema  NEUROLOGIC:  Does not respond to commands. Opens eyes spontaneous.     NEW DATA/RESULTS:   Culture Micro   Date Value Ref Range Status   10/21/2017 No growth after 2 days  Preliminary   10/21/2017 No growth after 2 days  Preliminary "   10/20/2017 No growth after 3 days  Preliminary   10/20/2017 No growth after 3 days  Preliminary   10/19/2017 No growth after 4 days  Preliminary       Recent Labs   Lab Test  10/23/17   0341  10/21/17   0335  10/16/17   0323  10/09/17   0316  10/07/17   1333  10/06/17   1859   CRP  30.0*  44.0*  48.0*  240.0*  290.0*  310.0*     Recent Labs   Lab Test  10/23/17   0341  10/22/17   0338  10/21/17   0357  10/20/17   0412  10/19/17   0354  10/18/17   0401   WBC  9.2  10.2  10.9  13.5*  18.6*  14.7*     Recent Labs   Lab Test  10/23/17   0341  10/22/17   2109  10/22/17   1531  10/22/17   0338   CR  1.73*  1.74*  1.71*  1.86*   GFRESTIMATED  40*  40*  40*  37*     Hematology Studies  Recent Labs   Lab Test  10/23/17   0341  10/22/17   0338  10/22/17   0016  10/21/17   0357  10/20/17   0412  10/19/17   0354   10/18/17   0401   10/06/17   1020   05/06/13   1703  12/04/12   1016  02/02/11   1506   11/06/09   1155  11/02/09   1717   WBC  9.2  10.2   --   10.9  13.5*  18.6*   --   14.7*   < >  17.1*   < >  10.6  11.9*  11.9*   < >  9.2  12.1*   ANEU   --    --    --    --    --    --    --    --    --   15.7*   --   7.2  7.9  8.2   --   5.9  8.3   AEOS   --    --    --    --    --    --    --    --    --   0.0   --   0.0  0.4  0.3   --   0.3  0.3   HCT  22.9*  23.3*   --   23.0*  24.4*  27.8*   --   26.2*   < >  43.8   < >  46.2  47.4  46.8   < >  47.1  45.9   PLT  379  368  365  344  334  351   < >  297   < >  185   < >  315  259  315   < >  303  312    < > = values in this interval not displayed.     Metabolic  Recent Labs   Lab Test  10/23/17   0341  10/22/17   2109  10/22/17   1531   NA  149*  149*  147*   BUN  40*  41*  43*   CO2  23  24  23   CR  1.73*  1.74*  1.71*   GFRESTIMATED  40*  40*  40*     Hepatic Studies  Recent Labs   Lab Test  10/21/17   0335  10/06/17   1020  06/03/16   1716  04/26/16   0654   BILITOTAL   --   1.5*  0.3  0.6   ALKPHOS   --   85  104  91   ALBUMIN   --   2.8*  3.4  3.1*   AST  52*  24  19   20   ALT  35  33  32  26     Immunologlobulins  Recent Labs   Lab Test  10/23/17   0341  10/21/17   0357  05/06/13   1703   SED  122*  108*  6     Imaging:  Recent Results (from the past 48 hour(s))   XR Chest Port 1 View    Narrative    XR CHEST PORT 1 VW  10/21/2017 3:10 PM      HISTORY: RN placed PICC - verify tip placement    COMPARISON: 10/21/2017 at 0644 hrs.    FINDINGS: Single portable AP view of the chest semi-upright.  Endotracheal tube over the midthoracic trachea. Right upper extremity  PICC tip projects over the mid SVC. Right IJ central venous catheter  tip projects over the low SVC. Enteric tube proceeds below the level  of the diaphragm and the inferior margin of the field-of-view. Aortic  valve prosthesis. Intact median sternotomy wires.. The  cardiomediastinal silhouette and pulmonary vasculature are stable and  within normal limits. Lung volumes are unchanged. Perihilar and  bibasilar streaky opacities similar to previous. The visualized upper  abdomen, osseous structures, and soft tissues are unremarkable.      Impression    IMPRESSION:   1. Right upper extremity PICC tip projects over the mid SVC. Other  support devices stable as above.  2. Perihilar and bibasilar streaky opacities most consistent with  atelectasis are similar to prior.    I have personally reviewed the examination and initial interpretation  and I agree with the findings.    KENNY SCHAFFER MD   CT Soft Tissue Neck w/o Contrast    Narrative    CT SOFT TISSUE NECK W/O CONTRAST 10/21/2017 5:52 PM    History:  left jaw neck swelling      Comparison:  Whole-body PET/CT 10/19/2017     Technique: Thin section helical CT images were obtained from the skull  base down to the level of the aortic arch.  Axial, coronal and  sagittal reformations were performed with 3 mm slice thickness  reconstruction. Images were reviewed in soft tissue, lung and bone  windows.    Findings:   Endotracheal tube in place, terminating in the midthoracic  trachea.  The patient is in edentulous. Right-sided IJ central venous catheter  is partially visualized, as well as a right-sided PICC line.  Nasoenteric tube is in place.    The visualized parotid glands and submandibular glands are within  normal limits. There is no significant cervical lymphadenopathy. There  is mild thickening of the left platysma muscle with strandy  inflammatory changes in the subcutaneous tissues suggesting a  cellulitis. No drainable fluid collection.    The fascial spaces in the neck are intact bilaterally. The major  vascular structures in the neck appear unremarkable.    There are degenerative changes of the cervical spine without acute  fracture. Degenerative changes are most notable at C6-7. Sternotomy  wires bilateral small mastoid effusions.    The remainder of the paranasal sinuses are relatively clear. Unchanged  hypodensity in the left temporoparietal region. The visualized lung  apices are clear.      Impression    Impression:  Left neck cellulitis. No evidence of drainable abscess.    I have personally reviewed the examination and initial interpretation  and I agree with the findings.    DAE CHAMORRO MD   XR Chest Port 1 View    Narrative    XR CHEST PORT 1 VW 10/22/2017 1:53 AM    History: interval change    Comparison: 10/21/2017    Findings: AP chest. ET tube 5.7 cm above the bart. Enteric feeding  tube courses out of the field-of-view. Right-sided PICC tip projects  over the SVC. Right IJ Central venous catheter tip projects over the  atriocaval junction. Decreased lung volumes. Stable cardiomegaly.  Pulmonary vascular prominence. No significant pleural effusion or  pneumothorax.      Impression    Impression:   1. Stable support devices.  2. Decreased lung volumes. Improving bibasilar atelectasis.    I have personally reviewed the examination and initial interpretation  and I agree with the findings.    JOSEPH SIMS MD       CXR 10/21 personally reviewed. No new  concerning infiltrates.      PET Oncology Whole body 10/19/17    IMPRESSION:   1. Multifocal hypermetabolic areas in the ascending aortic graft with  associated periaortic fluid collection which has a hypermetabolic rim.  These findings are concerning for aortic graft infection.  2. 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 This may  represent spread of infection from the aortic root to the pulmonary  artery.   2a. Right ventricular wall FDG uptake which is abnormal.  The  differential includes right heart strain, pulmonary hypertension,  myocarditis. Concerning for infection given its contiguous with the  pulmonary artery uptake.   3. Small FDG avid left knee joint effusion, likely infectious.

## 2017-10-24 LAB
ABO + RH BLD: NORMAL
ABO + RH BLD: NORMAL
ALT SERPL W P-5'-P-CCNC: 42 U/L (ref 0–70)
ANION GAP SERPL CALCULATED.3IONS-SCNC: 6 MMOL/L (ref 3–14)
ANION GAP SERPL CALCULATED.3IONS-SCNC: 7 MMOL/L (ref 3–14)
ANION GAP SERPL CALCULATED.3IONS-SCNC: 7 MMOL/L (ref 3–14)
ANION GAP SERPL CALCULATED.3IONS-SCNC: 8 MMOL/L (ref 3–14)
AST SERPL W P-5'-P-CCNC: 46 U/L (ref 0–45)
BACTERIA SPEC CULT: NO GROWTH
BACTERIA SPEC CULT: NO GROWTH
BASE EXCESS BLDA CALC-SCNC: 1.1 MMOL/L
BLD GP AB SCN SERPL QL: NORMAL
BLD PROD TYP BPU: NORMAL
BLD UNIT ID BPU: 0
BLD UNIT ID BPU: 0
BLOOD BANK CMNT PATIENT-IMP: NORMAL
BLOOD PRODUCT CODE: NORMAL
BLOOD PRODUCT CODE: NORMAL
BPU ID: NORMAL
BPU ID: NORMAL
BUN SERPL-MCNC: 33 MG/DL (ref 7–30)
BUN SERPL-MCNC: 36 MG/DL (ref 7–30)
BUN SERPL-MCNC: 38 MG/DL (ref 7–30)
BUN SERPL-MCNC: 38 MG/DL (ref 7–30)
CALCIUM SERPL-MCNC: 7.2 MG/DL (ref 8.5–10.1)
CALCIUM SERPL-MCNC: 7.4 MG/DL (ref 8.5–10.1)
CALCIUM SERPL-MCNC: 7.5 MG/DL (ref 8.5–10.1)
CALCIUM SERPL-MCNC: 7.7 MG/DL (ref 8.5–10.1)
CHLORIDE SERPL-SCNC: 113 MMOL/L (ref 94–109)
CHLORIDE SERPL-SCNC: 113 MMOL/L (ref 94–109)
CHLORIDE SERPL-SCNC: 117 MMOL/L (ref 94–109)
CHLORIDE SERPL-SCNC: 117 MMOL/L (ref 94–109)
CO2 SERPL-SCNC: 24 MMOL/L (ref 20–32)
CO2 SERPL-SCNC: 24 MMOL/L (ref 20–32)
CO2 SERPL-SCNC: 25 MMOL/L (ref 20–32)
CO2 SERPL-SCNC: 26 MMOL/L (ref 20–32)
CREAT SERPL-MCNC: 1.46 MG/DL (ref 0.66–1.25)
CREAT SERPL-MCNC: 1.52 MG/DL (ref 0.66–1.25)
CREAT SERPL-MCNC: 1.55 MG/DL (ref 0.66–1.25)
CREAT SERPL-MCNC: 1.62 MG/DL (ref 0.66–1.25)
ERYTHROCYTE [DISTWIDTH] IN BLOOD BY AUTOMATED COUNT: 20.3 % (ref 10–15)
GENTAMICIN SERPL-MCNC: 3.4 MG/L
GFR SERPL CREATININE-BSD FRML MDRD: 43 ML/MIN/1.7M2
GFR SERPL CREATININE-BSD FRML MDRD: 45 ML/MIN/1.7M2
GFR SERPL CREATININE-BSD FRML MDRD: 46 ML/MIN/1.7M2
GFR SERPL CREATININE-BSD FRML MDRD: 49 ML/MIN/1.7M2
GLUCOSE BLDC GLUCOMTR-MCNC: 130 MG/DL (ref 70–99)
GLUCOSE BLDC GLUCOMTR-MCNC: 131 MG/DL (ref 70–99)
GLUCOSE BLDC GLUCOMTR-MCNC: 134 MG/DL (ref 70–99)
GLUCOSE BLDC GLUCOMTR-MCNC: 138 MG/DL (ref 70–99)
GLUCOSE BLDC GLUCOMTR-MCNC: 146 MG/DL (ref 70–99)
GLUCOSE BLDC GLUCOMTR-MCNC: 146 MG/DL (ref 70–99)
GLUCOSE BLDC GLUCOMTR-MCNC: 149 MG/DL (ref 70–99)
GLUCOSE SERPL-MCNC: 136 MG/DL (ref 70–99)
GLUCOSE SERPL-MCNC: 142 MG/DL (ref 70–99)
GLUCOSE SERPL-MCNC: 145 MG/DL (ref 70–99)
GLUCOSE SERPL-MCNC: 149 MG/DL (ref 70–99)
HCO3 BLD-SCNC: 26 MMOL/L (ref 21–28)
HCT VFR BLD AUTO: 21.8 % (ref 40–53)
HGB BLD-MCNC: 6.9 G/DL (ref 13.3–17.7)
HGB BLD-MCNC: 8.8 G/DL (ref 13.3–17.7)
MAGNESIUM SERPL-MCNC: 2.4 MG/DL (ref 1.6–2.3)
MCH RBC QN AUTO: 30.4 PG (ref 26.5–33)
MCHC RBC AUTO-ENTMCNC: 31.7 G/DL (ref 31.5–36.5)
MCV RBC AUTO: 96 FL (ref 78–100)
NUM BPU REQUESTED: 2
O2/TOTAL GAS SETTING VFR VENT: 35 %
OSMOLALITY SERPL: 310 MMOL/KG (ref 280–301)
OSMOLALITY SERPL: 312 MMOL/KG (ref 280–301)
OSMOLALITY SERPL: 313 MMOL/KG (ref 280–301)
OSMOLALITY SERPL: 316 MMOL/KG (ref 280–301)
PCO2 BLD: 40 MM HG (ref 35–45)
PH BLD: 7.42 PH (ref 7.35–7.45)
PHOSPHATE SERPL-MCNC: 4 MG/DL (ref 2.5–4.5)
PLATELET # BLD AUTO: 355 10E9/L (ref 150–450)
PO2 BLD: 98 MM HG (ref 80–105)
POTASSIUM SERPL-SCNC: 3.8 MMOL/L (ref 3.4–5.3)
POTASSIUM SERPL-SCNC: 3.9 MMOL/L (ref 3.4–5.3)
POTASSIUM SERPL-SCNC: 3.9 MMOL/L (ref 3.4–5.3)
POTASSIUM SERPL-SCNC: 4 MMOL/L (ref 3.4–5.3)
RBC # BLD AUTO: 2.27 10E12/L (ref 4.4–5.9)
SODIUM SERPL-SCNC: 144 MMOL/L (ref 133–144)
SODIUM SERPL-SCNC: 147 MMOL/L (ref 133–144)
SODIUM SERPL-SCNC: 148 MMOL/L (ref 133–144)
SODIUM SERPL-SCNC: 149 MMOL/L (ref 133–144)
SPECIMEN EXP DATE BLD: NORMAL
SPECIMEN SOURCE: NORMAL
SPECIMEN SOURCE: NORMAL
TRANSFUSION STATUS PATIENT QL: NORMAL
WBC # BLD AUTO: 7.8 10E9/L (ref 4–11)

## 2017-10-24 PROCEDURE — 25000132 ZZH RX MED GY IP 250 OP 250 PS 637: Performed by: THORACIC SURGERY (CARDIOTHORACIC VASCULAR SURGERY)

## 2017-10-24 PROCEDURE — 25000128 H RX IP 250 OP 636: Performed by: NEUROLOGICAL SURGERY

## 2017-10-24 PROCEDURE — S0032 INJECTION, NAFCILLIN SODIUM: HCPCS | Performed by: STUDENT IN AN ORGANIZED HEALTH CARE EDUCATION/TRAINING PROGRAM

## 2017-10-24 PROCEDURE — P9016 RBC LEUKOCYTES REDUCED: HCPCS | Performed by: THORACIC SURGERY (CARDIOTHORACIC VASCULAR SURGERY)

## 2017-10-24 PROCEDURE — 86850 RBC ANTIBODY SCREEN: CPT | Performed by: THORACIC SURGERY (CARDIOTHORACIC VASCULAR SURGERY)

## 2017-10-24 PROCEDURE — 80048 BASIC METABOLIC PNL TOTAL CA: CPT | Performed by: STUDENT IN AN ORGANIZED HEALTH CARE EDUCATION/TRAINING PROGRAM

## 2017-10-24 PROCEDURE — 83930 ASSAY OF BLOOD OSMOLALITY: CPT | Performed by: STUDENT IN AN ORGANIZED HEALTH CARE EDUCATION/TRAINING PROGRAM

## 2017-10-24 PROCEDURE — 85027 COMPLETE CBC AUTOMATED: CPT | Performed by: ORTHOPAEDIC SURGERY

## 2017-10-24 PROCEDURE — 84100 ASSAY OF PHOSPHORUS: CPT | Performed by: ORTHOPAEDIC SURGERY

## 2017-10-24 PROCEDURE — 94003 VENT MGMT INPAT SUBQ DAY: CPT

## 2017-10-24 PROCEDURE — 85018 HEMOGLOBIN: CPT

## 2017-10-24 PROCEDURE — 86900 BLOOD TYPING SEROLOGIC ABO: CPT | Performed by: THORACIC SURGERY (CARDIOTHORACIC VASCULAR SURGERY)

## 2017-10-24 PROCEDURE — 80170 ASSAY OF GENTAMICIN: CPT | Performed by: THORACIC SURGERY (CARDIOTHORACIC VASCULAR SURGERY)

## 2017-10-24 PROCEDURE — 80170 ASSAY OF GENTAMICIN: CPT | Performed by: STUDENT IN AN ORGANIZED HEALTH CARE EDUCATION/TRAINING PROGRAM

## 2017-10-24 PROCEDURE — 25000132 ZZH RX MED GY IP 250 OP 250 PS 637: Performed by: SURGERY

## 2017-10-24 PROCEDURE — 27210913 ZZH NUTRITION PRODUCT INTERMEDIATE PACKET

## 2017-10-24 PROCEDURE — 25000125 ZZHC RX 250: Performed by: STUDENT IN AN ORGANIZED HEALTH CARE EDUCATION/TRAINING PROGRAM

## 2017-10-24 PROCEDURE — 40000275 ZZH STATISTIC RCP TIME EA 10 MIN

## 2017-10-24 PROCEDURE — 80048 BASIC METABOLIC PNL TOTAL CA: CPT | Performed by: ORTHOPAEDIC SURGERY

## 2017-10-24 PROCEDURE — 25000132 ZZH RX MED GY IP 250 OP 250 PS 637: Performed by: STUDENT IN AN ORGANIZED HEALTH CARE EDUCATION/TRAINING PROGRAM

## 2017-10-24 PROCEDURE — 25000132 ZZH RX MED GY IP 250 OP 250 PS 637: Performed by: PHYSICIAN ASSISTANT

## 2017-10-24 PROCEDURE — 84450 TRANSFERASE (AST) (SGOT): CPT | Performed by: ORTHOPAEDIC SURGERY

## 2017-10-24 PROCEDURE — 40000014 ZZH STATISTIC ARTERIAL MONITORING DAILY

## 2017-10-24 PROCEDURE — 00000146 ZZHCL STATISTIC GLUCOSE BY METER IP

## 2017-10-24 PROCEDURE — 20000004 ZZH R&B ICU UMMC

## 2017-10-24 PROCEDURE — 99291 CRITICAL CARE FIRST HOUR: CPT | Mod: GC | Performed by: ANESTHESIOLOGY

## 2017-10-24 PROCEDURE — 82803 BLOOD GASES ANY COMBINATION: CPT | Performed by: ORTHOPAEDIC SURGERY

## 2017-10-24 PROCEDURE — 83735 ASSAY OF MAGNESIUM: CPT | Performed by: ORTHOPAEDIC SURGERY

## 2017-10-24 PROCEDURE — 84460 ALANINE AMINO (ALT) (SGPT): CPT | Performed by: ORTHOPAEDIC SURGERY

## 2017-10-24 PROCEDURE — 83930 ASSAY OF BLOOD OSMOLALITY: CPT | Performed by: ORTHOPAEDIC SURGERY

## 2017-10-24 PROCEDURE — 25000128 H RX IP 250 OP 636: Performed by: THORACIC SURGERY (CARDIOTHORACIC VASCULAR SURGERY)

## 2017-10-24 PROCEDURE — 27210429 ZZH NUTRITION PRODUCT INTERMEDIATE LITER

## 2017-10-24 PROCEDURE — 25000128 H RX IP 250 OP 636: Performed by: STUDENT IN AN ORGANIZED HEALTH CARE EDUCATION/TRAINING PROGRAM

## 2017-10-24 PROCEDURE — 86923 COMPATIBILITY TEST ELECTRIC: CPT | Performed by: THORACIC SURGERY (CARDIOTHORACIC VASCULAR SURGERY)

## 2017-10-24 PROCEDURE — 86901 BLOOD TYPING SEROLOGIC RH(D): CPT | Performed by: THORACIC SURGERY (CARDIOTHORACIC VASCULAR SURGERY)

## 2017-10-24 RX ADMIN — NAFCILLIN SODIUM: 2 INJECTION, POWDER, LYOPHILIZED, FOR SOLUTION INTRAMUSCULAR; INTRAVENOUS at 20:28

## 2017-10-24 RX ADMIN — NAFCILLIN SODIUM: 2 INJECTION, POWDER, LYOPHILIZED, FOR SOLUTION INTRAMUSCULAR; INTRAVENOUS at 08:11

## 2017-10-24 RX ADMIN — NAFCILLIN SODIUM: 2 INJECTION, POWDER, LYOPHILIZED, FOR SOLUTION INTRAMUSCULAR; INTRAVENOUS at 13:26

## 2017-10-24 RX ADMIN — NAFCILLIN SODIUM: 2 INJECTION, POWDER, LYOPHILIZED, FOR SOLUTION INTRAMUSCULAR; INTRAVENOUS at 04:02

## 2017-10-24 RX ADMIN — NYSTATIN 500000 UNITS: 100000 SUSPENSION ORAL at 16:39

## 2017-10-24 RX ADMIN — NAFCILLIN SODIUM: 2 INJECTION, POWDER, LYOPHILIZED, FOR SOLUTION INTRAMUSCULAR; INTRAVENOUS at 16:39

## 2017-10-24 RX ADMIN — HYDRALAZINE HYDROCHLORIDE 10 MG: 20 INJECTION INTRAMUSCULAR; INTRAVENOUS at 22:03

## 2017-10-24 RX ADMIN — METOPROLOL TARTRATE 12.5 MG: 100 TABLET, FILM COATED ORAL at 08:11

## 2017-10-24 RX ADMIN — ACETAMINOPHEN 975 MG: 325 SOLUTION ORAL at 08:10

## 2017-10-24 RX ADMIN — DEXMEDETOMIDINE HYDROCHLORIDE 0.4 MCG/KG/HR: 4 INJECTION, SOLUTION INTRAVENOUS at 10:36

## 2017-10-24 RX ADMIN — Medication 1 PACKET: at 08:15

## 2017-10-24 RX ADMIN — FAMOTIDINE 20 MG: 40 POWDER, FOR SUSPENSION ORAL at 08:12

## 2017-10-24 RX ADMIN — INSULIN ASPART 1 UNITS: 100 INJECTION, SOLUTION INTRAVENOUS; SUBCUTANEOUS at 23:05

## 2017-10-24 RX ADMIN — HYDRALAZINE HYDROCHLORIDE 10 MG: 20 INJECTION INTRAMUSCULAR; INTRAVENOUS at 22:59

## 2017-10-24 RX ADMIN — NYSTATIN 500000 UNITS: 100000 SUSPENSION ORAL at 01:59

## 2017-10-24 RX ADMIN — ACETAMINOPHEN 975 MG: 325 SOLUTION ORAL at 16:38

## 2017-10-24 RX ADMIN — ACETAMINOPHEN 975 MG: 325 SOLUTION ORAL at 00:12

## 2017-10-24 RX ADMIN — Medication 300 MG: at 20:27

## 2017-10-24 RX ADMIN — POTASSIUM CHLORIDE 20 MEQ: 1.5 POWDER, FOR SOLUTION ORAL at 06:11

## 2017-10-24 RX ADMIN — INSULIN ASPART 1 UNITS: 100 INJECTION, SOLUTION INTRAVENOUS; SUBCUTANEOUS at 00:17

## 2017-10-24 RX ADMIN — GENTAMICIN SULFATE 80 MG: 80 INJECTION, SOLUTION INTRAVENOUS at 08:11

## 2017-10-24 RX ADMIN — POTASSIUM CHLORIDE 20 MEQ: 1.5 POWDER, FOR SOLUTION ORAL at 18:11

## 2017-10-24 RX ADMIN — DEXMEDETOMIDINE HYDROCHLORIDE 0.4 MCG/KG/HR: 4 INJECTION, SOLUTION INTRAVENOUS at 20:27

## 2017-10-24 RX ADMIN — NAFCILLIN SODIUM: 2 INJECTION, POWDER, LYOPHILIZED, FOR SOLUTION INTRAMUSCULAR; INTRAVENOUS at 23:46

## 2017-10-24 RX ADMIN — Medication 300 MG: at 08:11

## 2017-10-24 RX ADMIN — ACETAMINOPHEN 975 MG: 325 SOLUTION ORAL at 23:46

## 2017-10-24 RX ADMIN — NYSTATIN 500000 UNITS: 100000 SUSPENSION ORAL at 20:27

## 2017-10-24 RX ADMIN — NAFCILLIN SODIUM: 2 INJECTION, POWDER, LYOPHILIZED, FOR SOLUTION INTRAMUSCULAR; INTRAVENOUS at 00:13

## 2017-10-24 RX ADMIN — INSULIN ASPART 1 UNITS: 100 INJECTION, SOLUTION INTRAVENOUS; SUBCUTANEOUS at 04:21

## 2017-10-24 RX ADMIN — ASPIRIN 81 MG CHEWABLE TABLET 81 MG: 81 TABLET CHEWABLE at 08:11

## 2017-10-24 RX ADMIN — METOPROLOL TARTRATE 12.5 MG: 100 TABLET, FILM COATED ORAL at 20:27

## 2017-10-24 RX ADMIN — LEVOFLOXACIN 750 MG: 5 INJECTION, SOLUTION INTRAVENOUS at 11:38

## 2017-10-24 RX ADMIN — MULTIVITAMIN 15 ML: LIQUID ORAL at 08:11

## 2017-10-24 RX ADMIN — NYSTATIN 500000 UNITS: 100000 SUSPENSION ORAL at 08:18

## 2017-10-24 RX ADMIN — HYDRALAZINE HYDROCHLORIDE 10 MG: 20 INJECTION INTRAMUSCULAR; INTRAVENOUS at 21:19

## 2017-10-24 NOTE — PROGRESS NOTES
CV ICU Progress Note    POD: 16 Days Post-Op  LOS: 18    Admission History: Mr. Cricket Miguel is a 62 year old gentleman w/ h/o AVR and ascending aortic repair in 2016 admitted with acute mental status changes found to have large moises-aortic fluid collection and multiple strokes likely septic emboli.  MRI findings showed left posterior MCA, left PICA, and small right PCA infarcts.    S/Interval History:   No significant changes overnight.  Neuro exam unchanged.  Neurocrit evaluating patient, awaiting their note.  Family meeting scheduled for this evening at 1800 with CV surgery, Neurocrit, and CVICU team.      O:    Temp: 99.6  F (37.6  C) Temp  Min: 98.3  F (36.8  C)  Max: 100.3  F (37.9  C)  Resp: 14 Resp  Min: 9  Max: 17  SpO2: 99 % SpO2  Min: 95 %  Max: 100 %    No Data Recorded  Heart Rate: 84 Heart Rate  Min: 68  Max: 98  BP: (!) 149/99 Systolic (24hrs), Av , Min:80 , Max:163   Diastolic (24hrs), Av, Min:53, Max:108    Ventilation Mode: CPAP/PS  FiO2 (%): 35 %  PEEP (cm H2O): 5 cmH2O  Pressure Support (cm H2O): 7 cmH2O  Oxygen Concentration (%): 35 %  Resp: 14  Date 10/16/17 0700 - 10/17/17 0659   Shift 1214-5388 0481-8091 0461-1059 24 Hour Total   I  N  T  A  K  E   I.V. 114.6   114.6    NG/   400    Enteral 240   240    Shift Total  (mL/kg) 754.6  (8.34)   754.6  (8.34)   O  U  T  P  U  T   Urine 575   575    Shift Total  (mL/kg) 575  (6.35)   575  (6.35)   Weight (kg) 90.5 90.5 90.5 90.5         Past Medical History:   Diagnosis Date     AI (aortic insufficiency)      Aortic root dilatation (H)      AR (aortic regurgitation)     severe     Cardiomyopathy (H)      CHF (congestive heart failure), NYHA class II (H)      COPD, mild (H)     spirometry      Heart murmur      Hyperlipidemia LDL goal <70 10/31/2010     Hypertension goal BP (blood pressure) < 140/90      Inguinal hernia      left lung nodule  2008     Major depressive disorder, single episode, moderate (H)      Psoriasis  childhood     Tobacco use disorder        Past Surgical History:   Procedure Laterality Date     ARTHROSCOPY KNEE INCISION AND DRAINAGE Left 10/8/2017    Procedure: ARTHROSCOPY KNEE INCISION AND DRAINAGE;  Arthroscopic Incision and Drainage of Left Knee;  Surgeon: Lucretia Munoz MD;  Location: [facility] OR     HC SHOULDER ARTHROSCOPY, DX  2001    Arthroscopy, Shoulder RT Dr OSIRIS Andersen     HC SHOULDER ARTHROSCOPY, DX  1/04    LT arthroscopy Dr OSIRIS Andersen     HERNIA REPAIR, UMBILICAL  1/08    incarcerated - dr rockwell     HERNIORRHAPHY INGUINAL  12/21/2012    HERNIORRHAPHY INGUINAL;  Right Inguinal Hernia Repair with mesh ;  Surgeon: Mello Rockwell MD;  Location: [facility] OR     REPLACE VALVE AORTIC N/A 5/17/2016    REPLACE VALVE AORTIC - Dr Bowers     ROTATOR CUFF REPAIR RT/LT  11/10    Rt arthroscopy - Dr OSIRIS Andersen     SURGICAL HISTORY OF -   5/16    AVR, severe AR, aortic root repair       Physical Exam    General: Intubated, lightly sedated, in no distress   Neck: Supple, no tracheal deviation  Cardiovascular: Regular tachy, nom/r/g  Pumonary: Non labored breathing on MV.  CTAB   Abdomen: Soft, non distended, non tender.   Ext: W/o edema, wwp  Neuro: Small sluggishly reactive pupil bilaterally approximately 2mm - Left oculocephalic & LT Corneal abscent with mild intermittent nystagmus to the RT side     Lab Results   Component Value Date    WBC 7.8 10/24/2017    HGB 8.8 (L) 10/24/2017    HCT 21.8 (L) 10/24/2017     10/24/2017     (H) 10/24/2017    POTASSIUM 4.0 10/24/2017    CHLORIDE 117 (H) 10/24/2017    CO2 24 10/24/2017    BUN 38 (H) 10/24/2017    CR 1.55 (H) 10/24/2017     (H) 10/24/2017     (H) 10/23/2017    DD 1.7 (H) 04/25/2016    NTBNPI 2768 (H) 04/25/2016    NTBNP 1302 (H) 06/03/2016    TROPONIN <0.07 12/05/2005    TROPI 0.634 (HH) 10/08/2017    AST 46 (H) 10/24/2017    ALT 42 10/24/2017    ALKPHOS 85 10/06/2017    BILITOTAL 1.5 (H) 10/06/2017    INR 1.03 10/08/2017            dexmedetomidine 0.4 mcg/kg/hr (10/24/17 1600)     IV fluid REPLACEMENT ONLY           Assessment and Plan: Mr. Cricket Miguel is a 62 year old gentleman w/ h/o AVR and ascending aortic repair in 5/2016 admitted with acute mental status changes found to have large moises-aortic fluid collection and multiple strokes likely septic emboli.  Concern for moises-infarct edema leading to expansion of the cerebellar infarct.    Neuro:     Multifocal CVA: L cerebellar, L MCA, and R occipital regions.  Concern for septic emboli.  Small microhemorrhages were observed.  Progressive edema noted.  Neurosurgery and neurocritical care are following.  Angio showed no evidence of mycotic aneurysms.  Neuro exam stable.    Neurocrit team evaluated patient today.  Awaiting their note and recommendations.     Physical exam not significantly changed; however, brainstem reflexes are intact.      ASA 81mg    Acetaminophen, Ibuprofen, Fentanyl prn    Precedex gtt for anxiety on ventilator    MRI 10/23/17 showed:  IMPRESSION: Evolution of known multiple territory infarcts in the  cerebrum and cerebellum, little changed compared to the most recent CT  10 days ago. However, infarcts are more extensive than seen on the  prior MRI 10/6/2017, particularly in the left parietal lobe and in the  left middle cerebellar peduncle/left lateral mitchell. No hydrocephalus.  Resp:     Intubated and mechanically ventilated.    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  1st degree AVB.  Plan for surgery no earlier than the week of Oct 23rd.     Tagged WBC scan showed likely infectious etiology.    Care conference 10/24/17 w/ CV surgery to discuss options to repair abscess    Tachycardia    Metoprolol started 10/19  GI/FEN:     Post pyloric feeding tube in place, keep infusing at goal rate.  Renal:     Monitor electrolytes, Cr, and urine output     Tiffany  goals K>4 and Mag>2    JOSE:     Monitor Urine Output. Cr     Hypernatremia, stable     Continue free water (100mL q1hour) with conservative Na reduction goals of 5 meq per 24hrs. Goal Na+ 145-155 per NCC  Q6 BMP  Endo:    Glucose checks, SSI to serum glucose >80, <180 mg/dL  ID:     Sepsis: MSSA bacteremia with likely source of the L knee septic arthritis. Knee is s/p arthroscopic irrigation and debridement 10/8. Renal function has recovered.  Otherwise hemodynamically stable without pressors.      Nafcillin 2g IV Q4 hours and Levofloxacin 750mg IV Q48 hours, Rifampin 300mg BID. Gentamicin 80mg IV daily. Blood cultures continue to be negative since 10/12. Nystatin (oral candidiasis)     PET scan 10/19 revealed likely infectious fluid around ascending aorta, plan to go to OR no earlier than week of 10/23    Tooth Remnant, left lower canine    OMF o/b, rec continuing abx and outpatient f/u    Monitor WBC daily   Heme:     Hgb:  Transfused 2 U this am due to Hgb 6.9  Prophylaxis: SCDs, Famotidine  Lines:      CVC 17 days    Espinoza 17 days    A-line 17 days    Rectal Tube 5 days      Dispo: Continue ICU care. Plan for family meeting, 10/24/17.      Code Status: Full Code    Jeramie Orellana DO  Anesthesiology Resident  CVICU Service, *[id number]    Patient seen and staffed with attending.

## 2017-10-24 NOTE — PLAN OF CARE
Problem: Patient Care Overview  Goal: Plan of Care/Patient Progress Review  Outcome: No Change  D/I/A: Patient admitted on 10/6/17 for large moises-aortic fluid collection and multiple strokes.             Neuro- Predecex at 0.4 mcg/kg/hr. Pupils round and reactive to light, 2-4 mm. Not tracking. Grimaces with painful stimuli. Withdraws LUE and LLE to pain. Not following commands.   CV- SR with rare PVCs. Afebrile. MAP > 65 and SBP <140 without intervention. Gave 1 unit PRBC for Hgb 6.9.   Resp- Lung sounds coarse to clear/diminished with suctioning. Vent mode CPAP/PS with PS 7/ PEEP 5.0/ FiO2 35%.   GI- Bowel sounds auscultated X 4. TF at goal. FWF 50 mL/hr due to elevated serum sodium.   - Condom catheter UO adequate.   P: Continue to monitor and notify MD of any changes. Care conference today at 1700.

## 2017-10-24 NOTE — PROGRESS NOTES
CVICU PROGRESS NOTE  10/24/2017  Attending: Ramesh Kuo, *    S:   No overnight events. More awake today, still does not follow commands    O:   Vitals:    10/24/17 1600 10/24/17 1630 10/24/17 1645 10/24/17 1700   BP: (!) 149/99   (!) 135/96   BP Location: Left arm      Pulse:       Resp: 14      Temp: 99.6  F (37.6  C)      TempSrc: Axillary      SpO2: 99% 99% 98% 99%   Weight:       Height:         Vitals:    10/21/17 2000 10/23/17 0400 10/24/17 0400   Weight: 96.4 kg (212 lb 8.4 oz) 95.3 kg (210 lb 1.6 oz) 93.8 kg (206 lb 12.7 oz)     + 4 kg since admit      Intake/Output Summary (Last 24 hours) at 10/24/17 1724  Last data filed at 10/24/17 1600   Gross per 24 hour   Intake          5173.77 ml   Output             3665 ml   Net          1508.77 ml       Ventilation Mode: CPAP/PS  FiO2 (%): 35 %  PEEP (cm H2O): 5 cmH2O  Pressure Support (cm H2O): 7 cmH2O  Oxygen Concentration (%): 35 %  Resp: 14    Recent Labs  Lab 10/24/17  0405 10/23/17  0341 10/22/17  0336 10/21/17  0335   PH 7.42 7.43 7.42 7.41   PCO2 40 37 36 37   PO2 98 128* 148* 140*   HCO3 26 24 23 23   O2PER 35.0 35 40 40       MAPs:   General: Intubated, lightly sedated, does not follow commands but tracking and trying to mouth words.opens eyes spontaneously.   Neck: Supple, no tracheal deviation, mild left jaw swelling  Cardiovascular: RRR  Pumonary: Non labored breathing on MV  Abdomen: Soft, non distended, non tender  Ext: Minimal edema, warm  Neuro: Does not follow commands      Labs:  Lab Results   Component Value Date    WBC 7.8 10/24/2017     Lab Results   Component Value Date    RBC 2.27 10/24/2017     Lab Results   Component Value Date    HGB 8.8 10/24/2017     Lab Results   Component Value Date    HCT 21.8 10/24/2017     No components found for: MCT  Lab Results   Component Value Date    MCV 96 10/24/2017     Lab Results   Component Value Date    [facility] 30.4 10/24/2017     Lab Results   Component Value Date    [facility] 31.7 10/24/2017      Lab Results   Component Value Date    RDW 20.3 10/24/2017     Lab Results   Component Value Date     10/24/2017     Last Basic Metabolic Panel:  Lab Results   Component Value Date     10/24/2017      Lab Results   Component Value Date    POTASSIUM 4.0 10/24/2017     Lab Results   Component Value Date    CHLORIDE 117 10/24/2017     Lab Results   Component Value Date    IZABELLA 7.2 10/24/2017     Lab Results   Component Value Date    CO2 24 10/24/2017     Lab Results   Component Value Date    BUN 38 10/24/2017     Lab Results   Component Value Date    CR 1.55 10/24/2017     Lab Results   Component Value Date     10/24/2017       GLUCOSE:     Recent Labs  Lab 10/24/17  1640 10/24/17  1224 10/24/17  1030 10/24/17  0816 10/24/17  0417 10/24/17  0405 10/24/17  0016 10/23/17  2211 10/23/17  2020  10/23/17  1700  10/23/17  1015  10/23/17  0341   GLC  --   --  149*  --   --  145*  --  155*  --   --  135*  --  153*  --  149*   * 130*  --  134* 146*  --  146*  --  119*  < >  --   < >  --   < >  --    < > = values in this interval not displayed.      Imaging:  MRI reviewed      A/P:   Cricket Miguel is a 62 year old with history of AVR and ascending aortic repair in May 2016 presented on 10/6 with acute mental status changes found to have large moises-aortic fluid collection and multiple strokes likely septic emboli.    Neuro: Tylenol scheduled for fevers,continue neuro checks Q2H,  precedex and wake up as able. Okay to use ibuprofen for fevers if needed per neuro. MRI head and spine with interval changes as expected, no new findings. Care conference today  Resp: Inability to protect airway requiring mechanical ventilation, on minimal vent settings, did well with pressure support. Will continue to do trials today.     CV: Goal normotension per neurology/neurosurgery. ASA. Low dose metoprolol BID for intermittent SVT. Surgery planning following care conference  GI/FEN: Tube feeds to goal, continue free  water for hypernatremia (goal 145-155, go slow per neuro),  Renal: JOSE non-oliguric, clinically euvolemic   Endo: SSI  ID: Continue current antibiotic regimen pending ID recommendations (Nafcillin, levofloxacin, gentamycin), daily blood cultures negative since 10/13. Added rifampin to abx per ID. Stopped daily cultures since they have been negative. WBC slowly decreasing. PET scan done showed increased uptake around the heart and knee. OMFS consulted for tooth infection as per Dentistry. CT neck shows no abscess, cellulitis  Heme: No bleeding concerns at this time, hold anticoagulation per neuro and neurosurgery  Prophylaxis: H2 blocker, no chemical dvt ppx due to evolving stroke  Dispo: CVICU    Discussed with CVTS fellow.   Staff surgeons have been informed of changes through both  verbal and written communication.      Walter Bullock   Surgery PGY3  [phone]

## 2017-10-24 NOTE — PROGRESS NOTES
Neuroscience Intensive Care Progress Note    10/24/2017  Mr. Cricket Miguel is a 62 year old gentleman w/ h/o AVR and ascending aortic repair in 5/2016, 1st degree AVB present, observing with serial EKGs. admitted with acute mental status changes found to have large moises-aortic fluid collection and multiple strokes likely septic emboli. The strokes involved the L cerebellar, L MCA, and R occipital regions. Small microhemorrhages were observed.  Concern for moises-infarct edema leading to expansion of the cerebellar infarct. Cerebral angiogram showed no evidence of mycotic aneurysm. NeuroICU and Neurosurgery following.  Hemodynamically stable off pressors.  Blood cultures growing MSSA now clearing intermittently.  Likely source of L knee septic arthritis which underwent arthroscopic irrigation and debridement 10/8.  On nafcillin/levaquin/gent - appreciate ID assistance.  JOSE present but improving.  Current plan for non-contrast CT abdomen early this week to evaluate prior signs of R renal pyelonephritis. Tagged WBC scan later this week.  Possible surgery as early as the week of Oct 23rd.  Attempting to lighten sedation and attempt extubation this week though he has been slow to awaken from sedation likely due to his inflammatory state in the setting of multiple strokes.  He is also increasingly febrile over the last two days.  His antipyretic regimen was intensified today.  Plan for family meeting this week after the tagged WBC scan to discuss treatment options.    24 hour events:  Pt is on 0.4 precedex, open his eyes spontaneously, Small sluggishly reactive pupil bilaterally approximately 2mm - Left oculocephalic & LT Corneal abscent with mild intermittent nystagmus to the RT side , ON PRESURE SUPPORT.  MRI Brain & whole spine shows minimal evolusion of the previous strokes with no new lesions in the spinal cord otr the brain.     24 Hour Vital Signs Summary:  Temperatures:  Current - Temp: 101.1  F (38.4  C); Max -  Temp  Av.8  F (38.2  C)  Min: 99.9  F (37.7  C)  Max: 101.5  F (38.6  C)  Respiration range: Resp  Av.4  Min: 16  Max: 20  Pulse range: No Data Recorded  Blood pressure range: Systolic (24hrs), Av , Min:94 , Max:175   ; Diastolic (24hrs), Av, Min:57, Max:98    Pulse oximetry range: SpO2  Av.2 %  Min: 93 %  Max: 97 %    Ventilator Settings  Ventilation Mode: CPAP/PS  FiO2 (%): 35 %  PEEP (cm H2O): 5 cmH2O  Pressure Support (cm H2O): 7 cmH2O  Oxygen Concentration (%): 35 %  Resp: 14      Intake/Output Summary (Last 24 hours) at 10/13/17 1808  Last data filed at 10/13/17 1700   Gross per 24 hour   Intake          3939.68 ml   Output             3025 ml   Net           914.68 ml       Arterial Line BP: ()/(28-95) 157/82  MAP:  [31 mmHg-113 mmHg] 108 mmHg  BP - Mean:  [] 106    Current Medications:    acetaminophen  975 mg Oral or Feeding Tube Q8H     famotidine  20 mg Oral or Feeding Tube Q24H     protein modular  1 packet Per Feeding Tube Daily     insulin aspart  1-6 Units Subcutaneous Q4H     metoprolol  12.5 mg Oral BID     IV infusion builder WITH LARGE additive list   Intravenous Q4H     rifampin  300 mg Oral or Feeding Tube BID     gentamicin  80 mg Intravenous Q24H     nystatin  500,000 Units Swish & Spit Q6H     aspirin  81 mg Oral or Feeding Tube Daily     levofloxacin  750 mg Intravenous Q48H     influenza quadrivalent (PF) vacc age 3 yrs and older  0.5 mL Intramuscular Prior to discharge     pneumococcal vaccine  0.5 mL Intramuscular Prior to discharge     sodium chloride (PF)  3 mL Intravenous Q8H     multivitamins with minerals  15 mL Per Feeding Tube Daily     sodium chloride (PF)  3 mL Intracatheter Q8H       PRN Medications:  glucose **OR** dextrose **OR** glucagon, lidocaine 4%, heparin lock flush, polyethylene glycol, senna-docusate, lidocaine BUFFERED 1 %, ondansetron **OR** ondansetron, hydrALAZINE, sodium chloride (PF), - MEDICATION INSTRUCTIONS -,  "prochlorperazine **OR** prochlorperazine, naloxone, IV fluid REPLACEMENT ONLY, lidocaine (buffered or not buffered), lidocaine 4%, Reason beta blocker order not selected, insulin aspart, albuterol, albuterol, fentaNYL, potassium chloride, potassium chloride, potassium chloride, potassium chloride with lidocaine, magnesium sulfate, magnesium sulfate, potassium phosphate (KPHOS) in D5W IV, potassium phosphate (KPHOS) in D5W IV, potassium phosphate (KPHOS) in D5W IV, potassium phosphate (KPHOS) in D5W IV, potassium phosphate (KPHOS) in D5W IV, diphenhydrAMINE **OR** diphenhydrAMINE    Infusions:    dexmedetomidine 0.4 mcg/kg/hr (10/24/17 0301)     - MEDICATION INSTRUCTIONS -       IV fluid REPLACEMENT ONLY       Reason beta blocker order not selected         Allergies   Allergen Reactions     Lisinopril Swelling     One-sided facial swelling after being on lisinopril/HCTZ for one week.        Physical Examination:  BP (!) 137/93  Pulse 82  Temp 98.7  F (37.1  C) (Axillary)  Resp 14  Ht 1.727 m (5' 8\")  Wt 93.8 kg (206 lb 12.7 oz)  SpO2 100%  BMI 31.44 kg/m2  Intubated, on sedation. 2mm sluggish pupil - no corneal response on left - brisk response on right. Left oculocephalic absent. Not withdrawing with painful stimuli. Grimace to noxious stimuli, LT UL withdraw intermittentily.      Labs/Studies:  Recent Labs   Lab Test  10/13/17   1553  10/13/17   1406  10/13/17   1204   10/13/17   0357   10/12/17   0446   10/11/17   0312   NA  165*  167*  166*   < >  169*  169*   < >  167*  167*   < >  162*   POTASSIUM  4.1  4.2  4.5   < >  4.5   < >  4.2   --   3.8   CHLORIDE  133*  135*  134*   < >  136*   < >  134*  >125*   --   130*   CO2  27  27  26   < >  26   < >  24   --   24   ANIONGAP  5  6  6   < >  7   < >  9   --   8   GLC  155*  188*  176*   < >  183*   < >  209*   --   201*   BUN  79*  81*  82*   < >  81*   < >  74*   --   60*   CR  3.20*  3.22*  3.16*   < >  3.31*   < >  3.31*   --   3.35*   IZABELLA  7.9*  7.8* "  7.7*   < >  7.8*   < >  7.8*   --   8.1*   WBC   --    --    --    --   15.7*   --   11.5*   --   13.4*   RBC   --    --    --    --   3.62*   --   4.08*   --   4.45   HGB   --    --    --    --   10.7*   --   12.0*   --   13.4   PLT   --    --    --    --   322   --   266   --   238    < > = values in this interval not displayed.       Recent Labs   Lab Test  10/08/17   0328  10/06/17   1322  10/06/17   1020  05/18/16   0435   INR  1.03  1.25*  1.23*  1.28*   PTT   --   32  32  35           Recent Labs  Lab 10/24/17  0405 10/23/17  0341 10/22/17  0336 10/21/17  0335   PH 7.42 7.43 7.42 7.41   PCO2 40 37 36 37   PO2 98 128* 148* 140*   HCO3 26 24 23 23   O2PER 35.0 35 40 40           Imaging:  Reviewed     Assessment/Plan  Mr. Cricket Miguel is a 62 year old gentleman w/ h/o AVR and ascending aortic repair in 5/2016, 1st degree AVB present, observing with serial EKGs. admitted with acute mental status changes found to have large moises-aortic fluid collection and multiple strokes likely septic emboli. The strokes involved the L cerebellar, L MCA, and R occipital regions. Small microhemorrhages were observed.  Concern for moises-infarct edema leading to expansion of the cerebellar infarct. Cerebral angiogram showed no evidence of mycotic aneurysm. NeuroICU and Neurosurgery following.  Hemodynamically stable off pressors.  Blood cultures growing MSSA now clearing intermittently.  Likely source of L knee septic arthritis which underwent arthroscopic irrigation and debridement 10/8.  On nafcillin/levaquin/gent - appreciate ID assistance.  JOSE present but improving.  Current plan for non-contrast CT abdomen early this week to evaluate prior signs of R renal pyelonephritis. Tagged WBC scan later this week.  Possible surgery as early as the week of Oct 23rd.  Attempting to lighten sedation and attempt extubation this week though he has been slow to awaken from sedation likely due to his inflammatory state in the setting of  multiple strokes.  He is also increasingly febrile over the last two days.  His antipyretic regimen was intensified today.  Plan for family meeting this week after the tagged WBC scan to discuss treatment options.    - Exam changes noted above  - ASA 81 mg   - off sedation to assess neurologic exam  - No need for hypertonics at this point   - Cerebral Angiogram normal  - PET scan done showing 2 sites of infection in the the knee & the valvular lesion in the heart   - Family meeting was done & the family agreed with with proceeding with care & give a chance of extubation if tolerable, if not proceed with track & peg  - we spoke to the family in the presence of the primary team & explained for them the prognosis from the neurology stand of point that the patient will have Residual Rt sided weakness & some aphasia & may be some visual impairment but in general the prognosis is good & we answered all their questions.   - will sign off     Please if you have any questions feel free to contact the neuro ICU team       Plan discussed with Dr. Genaro Baker  Neurocritical care Fellow  Pager [phone]

## 2017-10-25 ENCOUNTER — APPOINTMENT (OUTPATIENT)
Dept: GENERAL RADIOLOGY | Facility: CLINIC | Age: 62
DRG: 004 | End: 2017-10-25
Attending: PSYCHIATRY & NEUROLOGY
Payer: COMMERCIAL

## 2017-10-25 ENCOUNTER — APPOINTMENT (OUTPATIENT)
Dept: PHYSICAL THERAPY | Facility: CLINIC | Age: 62
DRG: 004 | End: 2017-10-25
Attending: PSYCHIATRY & NEUROLOGY
Payer: COMMERCIAL

## 2017-10-25 LAB
ANION GAP SERPL CALCULATED.3IONS-SCNC: 9 MMOL/L (ref 3–14)
ANION GAP SERPL CALCULATED.3IONS-SCNC: 9 MMOL/L (ref 3–14)
BACTERIA SPEC CULT: NO GROWTH
BACTERIA SPEC CULT: NO GROWTH
BASE EXCESS BLDA CALC-SCNC: 1.5 MMOL/L
BUN SERPL-MCNC: 32 MG/DL (ref 7–30)
BUN SERPL-MCNC: 34 MG/DL (ref 7–30)
CALCIUM SERPL-MCNC: 7.7 MG/DL (ref 8.5–10.1)
CALCIUM SERPL-MCNC: 7.7 MG/DL (ref 8.5–10.1)
CHLORIDE SERPL-SCNC: 110 MMOL/L (ref 94–109)
CHLORIDE SERPL-SCNC: 114 MMOL/L (ref 94–109)
CO2 SERPL-SCNC: 24 MMOL/L (ref 20–32)
CO2 SERPL-SCNC: 25 MMOL/L (ref 20–32)
CREAT SERPL-MCNC: 1.5 MG/DL (ref 0.66–1.25)
CREAT SERPL-MCNC: 1.51 MG/DL (ref 0.66–1.25)
ERYTHROCYTE [DISTWIDTH] IN BLOOD BY AUTOMATED COUNT: 19.5 % (ref 10–15)
GENTAMICIN SERPL-MCNC: 1.2 MG/L
GFR SERPL CREATININE-BSD FRML MDRD: 47 ML/MIN/1.7M2
GFR SERPL CREATININE-BSD FRML MDRD: 47 ML/MIN/1.7M2
GLUCOSE BLDC GLUCOMTR-MCNC: 127 MG/DL (ref 70–99)
GLUCOSE BLDC GLUCOMTR-MCNC: 134 MG/DL (ref 70–99)
GLUCOSE BLDC GLUCOMTR-MCNC: 140 MG/DL (ref 70–99)
GLUCOSE BLDC GLUCOMTR-MCNC: 141 MG/DL (ref 70–99)
GLUCOSE BLDC GLUCOMTR-MCNC: 144 MG/DL (ref 70–99)
GLUCOSE SERPL-MCNC: 139 MG/DL (ref 70–99)
GLUCOSE SERPL-MCNC: 141 MG/DL (ref 70–99)
HCO3 BLD-SCNC: 26 MMOL/L (ref 21–28)
HCT VFR BLD AUTO: 29.3 % (ref 40–53)
HGB BLD-MCNC: 9.5 G/DL (ref 13.3–17.7)
MAGNESIUM SERPL-MCNC: 2.2 MG/DL (ref 1.6–2.3)
MAGNESIUM SERPL-MCNC: 2.3 MG/DL (ref 1.6–2.3)
MCH RBC QN AUTO: 30.7 PG (ref 26.5–33)
MCHC RBC AUTO-ENTMCNC: 32.4 G/DL (ref 31.5–36.5)
MCV RBC AUTO: 95 FL (ref 78–100)
O2/TOTAL GAS SETTING VFR VENT: 35 %
OSMOLALITY SERPL: 305 MMOL/KG (ref 280–301)
OSMOLALITY SERPL: 309 MMOL/KG (ref 280–301)
PCO2 BLD: 41 MM HG (ref 35–45)
PH BLD: 7.41 PH (ref 7.35–7.45)
PHOSPHATE SERPL-MCNC: 3.4 MG/DL (ref 2.5–4.5)
PHOSPHATE SERPL-MCNC: 3.8 MG/DL (ref 2.5–4.5)
PLATELET # BLD AUTO: 340 10E9/L (ref 150–450)
PO2 BLD: 105 MM HG (ref 80–105)
POTASSIUM SERPL-SCNC: 3.7 MMOL/L (ref 3.4–5.3)
POTASSIUM SERPL-SCNC: 4 MMOL/L (ref 3.4–5.3)
RBC # BLD AUTO: 3.09 10E12/L (ref 4.4–5.9)
SODIUM SERPL-SCNC: 144 MMOL/L (ref 133–144)
SODIUM SERPL-SCNC: 147 MMOL/L (ref 133–144)
SPECIMEN SOURCE: NORMAL
SPECIMEN SOURCE: NORMAL
WBC # BLD AUTO: 9.7 10E9/L (ref 4–11)

## 2017-10-25 PROCEDURE — 25000132 ZZH RX MED GY IP 250 OP 250 PS 637: Performed by: THORACIC SURGERY (CARDIOTHORACIC VASCULAR SURGERY)

## 2017-10-25 PROCEDURE — 25000128 H RX IP 250 OP 636: Performed by: STUDENT IN AN ORGANIZED HEALTH CARE EDUCATION/TRAINING PROGRAM

## 2017-10-25 PROCEDURE — 25000132 ZZH RX MED GY IP 250 OP 250 PS 637: Performed by: STUDENT IN AN ORGANIZED HEALTH CARE EDUCATION/TRAINING PROGRAM

## 2017-10-25 PROCEDURE — 80048 BASIC METABOLIC PNL TOTAL CA: CPT | Performed by: STUDENT IN AN ORGANIZED HEALTH CARE EDUCATION/TRAINING PROGRAM

## 2017-10-25 PROCEDURE — 40000275 ZZH STATISTIC RCP TIME EA 10 MIN

## 2017-10-25 PROCEDURE — 25000128 H RX IP 250 OP 636: Performed by: THORACIC SURGERY (CARDIOTHORACIC VASCULAR SURGERY)

## 2017-10-25 PROCEDURE — 25000125 ZZHC RX 250: Performed by: STUDENT IN AN ORGANIZED HEALTH CARE EDUCATION/TRAINING PROGRAM

## 2017-10-25 PROCEDURE — 71010 XR CHEST PORT 1 VW: CPT

## 2017-10-25 PROCEDURE — 80048 BASIC METABOLIC PNL TOTAL CA: CPT | Performed by: ORTHOPAEDIC SURGERY

## 2017-10-25 PROCEDURE — S0032 INJECTION, NAFCILLIN SODIUM: HCPCS | Performed by: STUDENT IN AN ORGANIZED HEALTH CARE EDUCATION/TRAINING PROGRAM

## 2017-10-25 PROCEDURE — 84100 ASSAY OF PHOSPHORUS: CPT | Performed by: ORTHOPAEDIC SURGERY

## 2017-10-25 PROCEDURE — 84100 ASSAY OF PHOSPHORUS: CPT | Performed by: STUDENT IN AN ORGANIZED HEALTH CARE EDUCATION/TRAINING PROGRAM

## 2017-10-25 PROCEDURE — S0032 INJECTION, NAFCILLIN SODIUM: HCPCS | Performed by: THORACIC SURGERY (CARDIOTHORACIC VASCULAR SURGERY)

## 2017-10-25 PROCEDURE — 83930 ASSAY OF BLOOD OSMOLALITY: CPT | Performed by: STUDENT IN AN ORGANIZED HEALTH CARE EDUCATION/TRAINING PROGRAM

## 2017-10-25 PROCEDURE — 25000125 ZZHC RX 250: Performed by: THORACIC SURGERY (CARDIOTHORACIC VASCULAR SURGERY)

## 2017-10-25 PROCEDURE — 85027 COMPLETE CBC AUTOMATED: CPT | Performed by: ORTHOPAEDIC SURGERY

## 2017-10-25 PROCEDURE — 97530 THERAPEUTIC ACTIVITIES: CPT | Mod: GP

## 2017-10-25 PROCEDURE — 25000132 ZZH RX MED GY IP 250 OP 250 PS 637: Performed by: ANESTHESIOLOGY

## 2017-10-25 PROCEDURE — 94003 VENT MGMT INPAT SUBQ DAY: CPT

## 2017-10-25 PROCEDURE — 27210429 ZZH NUTRITION PRODUCT INTERMEDIATE LITER

## 2017-10-25 PROCEDURE — 40000193 ZZH STATISTIC PT WARD VISIT

## 2017-10-25 PROCEDURE — 20000004 ZZH R&B ICU UMMC

## 2017-10-25 PROCEDURE — 83735 ASSAY OF MAGNESIUM: CPT | Performed by: ORTHOPAEDIC SURGERY

## 2017-10-25 PROCEDURE — 25000128 H RX IP 250 OP 636: Performed by: ANESTHESIOLOGY

## 2017-10-25 PROCEDURE — 25000132 ZZH RX MED GY IP 250 OP 250 PS 637: Performed by: PHYSICIAN ASSISTANT

## 2017-10-25 PROCEDURE — 25000132 ZZH RX MED GY IP 250 OP 250 PS 637: Performed by: SURGERY

## 2017-10-25 PROCEDURE — 00000146 ZZHCL STATISTIC GLUCOSE BY METER IP

## 2017-10-25 PROCEDURE — 97110 THERAPEUTIC EXERCISES: CPT | Mod: GP

## 2017-10-25 PROCEDURE — 40000014 ZZH STATISTIC ARTERIAL MONITORING DAILY

## 2017-10-25 PROCEDURE — 83930 ASSAY OF BLOOD OSMOLALITY: CPT | Performed by: ORTHOPAEDIC SURGERY

## 2017-10-25 PROCEDURE — 27210913 ZZH NUTRITION PRODUCT INTERMEDIATE PACKET

## 2017-10-25 PROCEDURE — 99291 CRITICAL CARE FIRST HOUR: CPT | Mod: GC | Performed by: ANESTHESIOLOGY

## 2017-10-25 PROCEDURE — 83735 ASSAY OF MAGNESIUM: CPT | Performed by: STUDENT IN AN ORGANIZED HEALTH CARE EDUCATION/TRAINING PROGRAM

## 2017-10-25 PROCEDURE — 82803 BLOOD GASES ANY COMBINATION: CPT | Performed by: ORTHOPAEDIC SURGERY

## 2017-10-25 PROCEDURE — 25000128 H RX IP 250 OP 636: Performed by: NEUROLOGICAL SURGERY

## 2017-10-25 RX ORDER — HEPARIN SODIUM 5000 [USP'U]/.5ML
5000 INJECTION, SOLUTION INTRAVENOUS; SUBCUTANEOUS EVERY 8 HOURS
Status: DISCONTINUED | OUTPATIENT
Start: 2017-10-25 | End: 2017-11-04 | Stop reason: HOSPADM

## 2017-10-25 RX ORDER — FUROSEMIDE 40 MG
40 TABLET ORAL ONCE
Status: COMPLETED | OUTPATIENT
Start: 2017-10-25 | End: 2017-10-25

## 2017-10-25 RX ORDER — LEVOFLOXACIN 5 MG/ML
750 INJECTION, SOLUTION INTRAVENOUS EVERY 24 HOURS
Status: COMPLETED | OUTPATIENT
Start: 2017-10-25 | End: 2017-11-04

## 2017-10-25 RX ORDER — NITROGLYCERIN 4 MG/G
1 OINTMENT RECTAL EVERY 12 HOURS
Status: DISCONTINUED | OUTPATIENT
Start: 2017-10-25 | End: 2017-10-26

## 2017-10-25 RX ADMIN — NAFCILLIN SODIUM: 2 INJECTION, POWDER, LYOPHILIZED, FOR SOLUTION INTRAMUSCULAR; INTRAVENOUS at 03:49

## 2017-10-25 RX ADMIN — ASPIRIN 81 MG CHEWABLE TABLET 81 MG: 81 TABLET CHEWABLE at 07:57

## 2017-10-25 RX ADMIN — HEPARIN SODIUM 5000 UNITS: 5000 INJECTION, SOLUTION INTRAVENOUS; SUBCUTANEOUS at 11:48

## 2017-10-25 RX ADMIN — INSULIN ASPART 1 UNITS: 100 INJECTION, SOLUTION INTRAVENOUS; SUBCUTANEOUS at 15:51

## 2017-10-25 RX ADMIN — NAFCILLIN SODIUM: 2 INJECTION, POWDER, LYOPHILIZED, FOR SOLUTION INTRAMUSCULAR; INTRAVENOUS at 08:03

## 2017-10-25 RX ADMIN — MULTIVITAMIN 15 ML: LIQUID ORAL at 07:56

## 2017-10-25 RX ADMIN — DEXMEDETOMIDINE HYDROCHLORIDE 0.2 MCG/KG/HR: 4 INJECTION, SOLUTION INTRAVENOUS at 15:34

## 2017-10-25 RX ADMIN — Medication 1 PACKET: at 07:58

## 2017-10-25 RX ADMIN — INSULIN ASPART 1 UNITS: 100 INJECTION, SOLUTION INTRAVENOUS; SUBCUTANEOUS at 19:46

## 2017-10-25 RX ADMIN — NYSTATIN 500000 UNITS: 100000 SUSPENSION ORAL at 19:51

## 2017-10-25 RX ADMIN — FUROSEMIDE 40 MG: 40 TABLET ORAL at 11:48

## 2017-10-25 RX ADMIN — HYDRALAZINE HYDROCHLORIDE 10 MG: 20 INJECTION INTRAMUSCULAR; INTRAVENOUS at 06:18

## 2017-10-25 RX ADMIN — HYDRALAZINE HYDROCHLORIDE 10 MG: 20 INJECTION INTRAMUSCULAR; INTRAVENOUS at 12:52

## 2017-10-25 RX ADMIN — ACETAMINOPHEN 975 MG: 325 SOLUTION ORAL at 07:56

## 2017-10-25 RX ADMIN — FENTANYL CITRATE 50 MCG: 50 INJECTION, SOLUTION INTRAMUSCULAR; INTRAVENOUS at 14:19

## 2017-10-25 RX ADMIN — HEPARIN SODIUM 5000 UNITS: 5000 INJECTION, SOLUTION INTRAVENOUS; SUBCUTANEOUS at 19:47

## 2017-10-25 RX ADMIN — METOPROLOL TARTRATE 12.5 MG: 100 TABLET, FILM COATED ORAL at 19:55

## 2017-10-25 RX ADMIN — Medication 300 MG: at 07:57

## 2017-10-25 RX ADMIN — POTASSIUM CHLORIDE 20 MEQ: 1.5 POWDER, FOR SOLUTION ORAL at 02:20

## 2017-10-25 RX ADMIN — FENTANYL CITRATE 100 MCG: 50 INJECTION, SOLUTION INTRAMUSCULAR; INTRAVENOUS at 22:59

## 2017-10-25 RX ADMIN — NYSTATIN 500000 UNITS: 100000 SUSPENSION ORAL at 02:21

## 2017-10-25 RX ADMIN — NAFCILLIN: 10 INJECTION, POWDER, FOR SOLUTION INTRAVENOUS at 17:39

## 2017-10-25 RX ADMIN — FAMOTIDINE 20 MG: 40 POWDER, FOR SUSPENSION ORAL at 07:57

## 2017-10-25 RX ADMIN — LEVOFLOXACIN 750 MG: 5 INJECTION, SOLUTION INTRAVENOUS at 10:51

## 2017-10-25 RX ADMIN — Medication 300 MG: at 19:55

## 2017-10-25 RX ADMIN — POTASSIUM CHLORIDE 20 MEQ: 1.5 POWDER, FOR SOLUTION ORAL at 18:56

## 2017-10-25 RX ADMIN — METOPROLOL TARTRATE 12.5 MG: 100 TABLET, FILM COATED ORAL at 07:57

## 2017-10-25 RX ADMIN — GENTAMICIN SULFATE 80 MG: 80 INJECTION, SOLUTION INTRAVENOUS at 09:31

## 2017-10-25 RX ADMIN — FENTANYL CITRATE 50 MCG: 50 INJECTION, SOLUTION INTRAMUSCULAR; INTRAVENOUS at 03:33

## 2017-10-25 RX ADMIN — ACETAMINOPHEN 975 MG: 325 SOLUTION ORAL at 15:51

## 2017-10-25 RX ADMIN — INSULIN ASPART 1 UNITS: 100 INJECTION, SOLUTION INTRAVENOUS; SUBCUTANEOUS at 08:01

## 2017-10-25 RX ADMIN — NAFCILLIN: 10 INJECTION, POWDER, FOR SOLUTION INTRAVENOUS at 20:01

## 2017-10-25 RX ADMIN — DEXMEDETOMIDINE HYDROCHLORIDE 0.6 MCG/KG/HR: 4 INJECTION, SOLUTION INTRAVENOUS at 03:49

## 2017-10-25 RX ADMIN — NAFCILLIN: 10 INJECTION, POWDER, FOR SOLUTION INTRAVENOUS at 11:48

## 2017-10-25 RX ADMIN — NYSTATIN 500000 UNITS: 100000 SUSPENSION ORAL at 07:58

## 2017-10-25 RX ADMIN — NYSTATIN 500000 UNITS: 100000 SUSPENSION ORAL at 14:03

## 2017-10-25 NOTE — PLAN OF CARE
Problem: Patient Care Overview  Goal: Plan of Care/Patient Progress Review  Outcome: Improving  D: Pt admitted for AMS on 10/6, found to have large moises-aortic fluid collection with multiple strokes, likely from septic emboli. Source of infection found to be L knee arthritis, irrigated/debrided on 10/8 and started on aggressive abx regimen. Has since been neurologically labile, but no further emboli suspected.      Neuro: On Precedex 0.2-0.6mcg/kg/min throughout day. Will thrash head side to side intermittently. Began to have localizing slight mvmts in LUE/LLE more consistently, but not to command. RUE/RLE withdraw. PERRL 2-4, brisk. Once pupils were uneven, but improved within 15 minutes and was not associated with any other neuro changes. 50mcg IV fentanyl given for non-verbal indicators of pain, effective. Pt up to chair via sling today for 2h, tolerated well.   CV: SR/ST. SBP goal < 140. Hydralizine 10mg given once, effective. BP very labile, occasionally will drop down to 80s/40s for prolonged periods.  Resp: Intubated. On PS 7/5 35% FiO2. Lungs coarse to clear.  GI: Rectal tube replaced in afternoon. Stools liquid, brown. TF in [place] at 50cc/hr, 75q1h flushes for high sodiums. Last sodium 144.  : Condom cath in place. UO 75-100cc/hr. Pt given one time dose 40mg lasix, great response ~1L off in four hours.  Skin: L knee debridement site, sutured WDL. Otherwise skin WDL.     P: Continue current POC. Monitor neuro and hemodynamic status closely. Trach planned for Friday at 2:00 PM.

## 2017-10-25 NOTE — PLAN OF CARE
Problem: Patient Care Overview  Goal: Plan of Care/Patient Progress Review  Discharge Planner PT   Patient plan for discharge: unable to state  Current status: PROM and stretching applied to all major joints in all major joints as appropriate. Pt orally intubated on CPAP PEEP 5 FiO2 35% - AVSS. Total A x 1-2 for rolling and dependent sling transfer bed > chair. Pt intermittently tracking writer with eyes and intermittently turning head to daughter's voice.   Barriers to return to prior living situation: medical status, home set-up  Recommendations for discharge: inpatient rehab  Rationale for recommendations: Pt is well below baseline for functional mobility and anticipate extensive recovery pending medical POC.        Entered by: Sadai Euceda 10/25/2017 1:15 PM

## 2017-10-25 NOTE — PHARMACY-AMINOGLYCOSIDE DOSING SERVICE
Pharmacy Aminoglycoside Follow-Up Note  Date of Service 2017  Patient's  1955   62 year old, male    Weight (Adjusted): 78.2 kg    Indication: Endocarditis  Current Gentamicin regimen:  80 mg IV q24h  Day of therapy: 20    Target goals based on synergy dosing  Goal Peak level: 3-5 mg/L  Goal Trough level: </= 1 mg/L    Current estimated CrCl: Estimated Creatinine Clearance: 55.6 mL/min (based on Cr of 1.5).    Creatinine for last 3 days  10/22/2017:  3:31 PM Creatinine 1.71 mg/dL;  9:09 PM Creatinine 1.74 mg/dL  10/23/2017:  3:41 AM Creatinine 1.73 mg/dL; 10:15 AM Creatinine 1.68 mg/dL;  5:00 PM Creatinine 1.58 mg/dL; 10:11 PM Creatinine 1.62 mg/dL  10/24/2017:  4:05 AM Creatinine 1.62 mg/dL; 10:30 AM Creatinine 1.55 mg/dL;  4:17 PM Creatinine 1.52 mg/dL; 10:42 PM Creatinine 1.46 mg/dL  10/25/2017:  3:46 AM Creatinine 1.50 mg/dL    Nephrotoxins and other renal medications (Future)    Start     Dose/Rate Route Frequency Ordered Stop    10/19/17 1430  rifampin (REIFADEN) suspension 300 mg      300 mg Oral or Feeding Tube 2 TIMES DAILY 10/19/17 1359      10/19/17 1400  nafcillin IV 2 g in D5W 100 mL infusion     Comments:  Indication: bacteremia    100 mL/hr  Intravenous EVERY 4 HOURS 10/19/17 1204      10/17/17 0859  gentamicin (GARAMYCIN) infusion 80 mg      80 mg  over 60 Minutes Intravenous EVERY 24 HOURS 10/16/17 2101 10/27/17 0859          Contrast Orders - past 72 hours     None          Aminoglycoside Levels - past 2 days  10/24/2017: 12:15 PM Gentamicin Level 3.4 mg/L; 10:42 PM Gentamicin Level 1.2 mg/L    Aminoglycosides IV Administrations (past 72 hours)                   gentamicin (GARAMYCIN) infusion 80 mg (mg) 80 mg New Bag 10/24/17 0811     80 mg New Bag 10/23/17 0823     80 mg New Bag 10/22/17 0818                Pharmacokinetic Analysis  Calculated Peak level: 4.6 mg/L  Calculated Trough level: 0.47 mg/L  Volume of distribution: 0.23 L/kg  Half-life: 7 hours          Interpretation  of levels and current regimen:  Aminoglycoside levels are within goal range    Has serum creatinine changed greater than 50% in the last 72 hours: No, but SCr continues to downtrend     Urine output:  good urine output    Renal function: Improving    Plan  1. Continue current dose    2.  Method of evaluation: 2 post dose levels    3. Pharmacy will continue to follow and check levels  as appropriate in 3-5 Days    Win West, PharmD  PGY-2 Critical Care Pharmacy Resident      Addendum  Gentamicin discontinued by team today.

## 2017-10-25 NOTE — PLAN OF CARE
Problem: Stroke (Ischemic) (Adult)  Goal: Signs and Symptoms of Listed Potential Problems Will be Absent, Minimized or Managed (Stroke)  Signs and symptoms of listed potential problems will be absent, minimized or managed by discharge/transition of care (reference Stroke (Ischemic) (Adult) CPG).   Outcome: No Change  Neuro: Waxes/wanes between somnolent & awake/alert. Attempts to track.  Pupils 2-3mm, sluggish, intermittently irregular. Withdraws on L side. No movement on R side.   Cardiac: Sinus 70-90s w/ rare PVC. Goal SBP <140, no intervention needed (other than scheduled metoprolol). T-max 100.2 axillary.  Resp: CPAP/PS 7/5 35% w/ volumes 300-600. Strong/productive cough. Thin/yellow secretions, moderate amt from ETT & orally.  GI: NJ w/ TF at 50ml/hr (goal) w/ q1h 75ml free water flush r/t hypernatremia. +BS/+flatus, Flexiseal in place w/ 300 output.   : Condom catheter in place, UO 60-100ml/hr.  LADs: PICC x3 w/ TKO for abx, precedex at 0.4mcg/kg/hr. L Rad ART.   Integ: Stiches on L knee- RAMA  Labs: Received 2u PRBC for hemoglobin on 6.9. Recheck 8.8.     Plan for trach/PEG to be scheduled in the next couple days. Continue to monitor and update MD with changes.

## 2017-10-25 NOTE — PROGRESS NOTES
CVICU PROGRESS NOTE  10/25/2017  Attending: Ramesh Kuo, *    S:   No overnight events. More awake today, still does not follow commands    O:   Vitals:    10/25/17 0900 10/25/17 1000 10/25/17 1100 10/25/17 1200   BP: 116/77 94/66 (!) 143/91    BP Location:       Pulse:       Resp: 16 18 16    Temp:    98.9  F (37.2  C)   TempSrc:    Oral   SpO2: 99% 99% 99% 99%   Weight:       Height:         Vitals:    10/23/17 0400 10/24/17 0400 10/25/17 0000   Weight: 95.3 kg (210 lb 1.6 oz) 93.8 kg (206 lb 12.7 oz) 94.9 kg (209 lb 3.5 oz)           Intake/Output Summary (Last 24 hours) at 10/25/17 1257  Last data filed at 10/25/17 1200   Gross per 24 hour   Intake          4348.48 ml   Output             3315 ml   Net          1033.48 ml         Ventilation Mode: CPAP/PS  FiO2 (%): 35 %  PEEP (cm H2O): 5 cmH2O  Pressure Support (cm H2O): 7 cmH2O  Oxygen Concentration (%): 35 %  Resp: 16    Recent Labs  Lab 10/25/17  0346 10/24/17  0405 10/23/17  0341 10/22/17  0336   PH 7.41 7.42 7.43 7.42   PCO2 41 40 37 36   PO2 105 98 128* 148*   HCO3 26 26 24 23   O2PER 35 35.0 35 40       MAPs:   General: Intubated, lightly sedated, does not follow commands but tracking and trying to mouth words.opens eyes spontaneously.   Neck: Supple, no tracheal deviation, mild left jaw swelling  Cardiovascular: RRR  Pumonary: Non labored breathing on MV  Abdomen: Soft, non distended, non tender  Ext: Minimal edema, warm  Neuro: Does not follow commands      Labs:  Lab Results   Component Value Date    WBC 7.8 10/24/2017     Lab Results   Component Value Date    RBC 2.27 10/24/2017     Lab Results   Component Value Date    HGB 8.8 10/24/2017     Lab Results   Component Value Date    HCT 21.8 10/24/2017     No components found for: MCT  Lab Results   Component Value Date    MCV 96 10/24/2017     Lab Results   Component Value Date    MCH 30.4 10/24/2017     Lab Results   Component Value Date    [facility] 31.7 10/24/2017     Lab Results   Component  Value Date    RDW 20.3 10/24/2017     Lab Results   Component Value Date     10/24/2017     Last Basic Metabolic Panel:  Lab Results   Component Value Date     10/24/2017      Lab Results   Component Value Date    POTASSIUM 4.0 10/24/2017     Lab Results   Component Value Date    CHLORIDE 117 10/24/2017     Lab Results   Component Value Date    IZABELLA 7.2 10/24/2017     Lab Results   Component Value Date    CO2 24 10/24/2017     Lab Results   Component Value Date    BUN 38 10/24/2017     Lab Results   Component Value Date    CR 1.55 10/24/2017     Lab Results   Component Value Date     10/24/2017       GLUCOSE:     Recent Labs  Lab 10/25/17  1150 10/25/17  0800 10/25/17  0356 10/25/17  0346 10/24/17  2246 10/24/17  2242 10/24/17  2026 10/24/17  1640 10/24/17  1617  10/24/17  1030  10/24/17  0405  10/23/17  2211   GLC  --   --   --  139*  --  142*  --   --  136*  --  149*  --  145*  --  155*   * 144* 127*  --  149*  --  138* 131*  --   < >  --   < >  --   < >  --    < > = values in this interval not displayed.      Imaging:  CXR and MRI reviewed      A/P:   Cricket Miguel is a 62 year old with history of AVR and ascending aortic repair in May 2016 presented on 10/6 with acute mental status changes found to have large moises-aortic fluid collection and multiple strokes likely septic emboli.    Neuro: Tylenol scheduled for fevers,continue neuro checks Q2H,  precedex and wake up as able. Okay to use ibuprofen for fevers if needed per neuro. MRI head and spine with interval changes as expected, no new findings. Care conference today  Resp: Inability to protect airway requiring mechanical ventilation, on minimal vent settings, did well with pressure support. Will continue to do trials today.  Will consult Dr. Marcelino for tracheostomy eval   CV: Goal normotension per neurology/neurosurgery. ASA. Low dose metoprolol BID for intermittent SVT. Surgery planning following care conference  GI/FEN: Tube  feeds to goal, continue free water for hypernatremia (goal 145-155, go slow per neuro),  Renal: JOSE non-oliguric, will trial lasix since he is volume up.   Endo: SSI  ID: Continue current antibiotic regimen pending ID recommendations (Nafcillin, levofloxacin, gentamycin), daily blood cultures negative since 10/13. Added rifampin to abx per ID. Stopped daily cultures since they have been negative. WBC slowly decreasing. PET scan done showed increased uptake around the heart and knee. OMFS consulted for tooth infection as per Dentistry. CT neck shows no abscess, cellulitis. Will discuss with ID and discontinue gentamicin.   Heme: No bleeding concerns at this time, hold anticoagulation per neuro and neurosurgery  Prophylaxis: H2 blocker, heparin SC    Dispo: CVICU    Discussed with CVTS fellow.   Staff surgeons have been informed of changes through both  verbal and written communication.      Rula Contreras   Surgery Resident  CVTS   [id number]

## 2017-10-25 NOTE — PROGRESS NOTES
CV ICU Progress Note    POD: 17 Days Post-Op  LOS: 19    Admission History: Mr. Cricket Miguel is a 62 year old gentleman w/ h/o AVR and ascending aortic repair in 2016 admitted with acute mental status changes found to have large moises-aortic fluid collection and multiple strokes likely septic emboli.  MRI findings showed left posterior MCA, left PICA, and small right PCA infarcts.    S/Interval History:   No significant changes overnight.  Meeting with family, CVICU, CVTS, and Neurocrit took place 10/24/17.  Plan is to postpone surgery of periaortic abscess until patient's neurologic status has stabilized and patient has shown improvement.  Discontinued gentamicin since patient will not be going to surgery and has been >2 weeks since initial insult.  Trach possibly 10/26/17.    O:    Temp: 98.9  F (37.2  C) Temp  Min: 98.6  F (37  C)  Max: 99.8  F (37.7  C)  Resp: 16 Resp  Min: 12  Max: 19  SpO2: 99 % SpO2  Min: 98 %  Max: 99 %    No Data Recorded  Heart Rate: 93 Heart Rate  Min: 71  Max: 105  BP: (!) 143/91 Systolic (24hrs), Av , Min:92 , Max:162   Diastolic (24hrs), Av, Min:62, Max:108    Ventilation Mode: CPAP/PS  FiO2 (%): 35 %  PEEP (cm H2O): 5 cmH2O  Pressure Support (cm H2O): 7 cmH2O  Oxygen Concentration (%): 35 %  Resp: 16  Date 10/16/17 0700 - 10/17/17 0659   Shift 8284-5119 3981-4111 0078-3503 24 Hour Total   I  N  T  A  K  E   I.V. 114.6   114.6    NG/   400    Enteral 240   240    Shift Total  (mL/kg) 754.6  (8.34)   754.6  (8.34)   O  U  T  P  U  T   Urine 575   575    Shift Total  (mL/kg) 575  (6.35)   575  (6.35)   Weight (kg) 90.5 90.5 90.5 90.5         Past Medical History:   Diagnosis Date     AI (aortic insufficiency)      Aortic root dilatation (H)      AR (aortic regurgitation)     severe     Cardiomyopathy (H)      CHF (congestive heart failure), NYHA class II (H)      COPD, mild (H)     spirometry      Heart murmur      Hyperlipidemia LDL goal <70 10/31/2010      Hypertension goal BP (blood pressure) < 140/90      Inguinal hernia      left lung nodule  1/29/2008     Major depressive disorder, single episode, moderate (H)      Psoriasis childhood     Tobacco use disorder        Past Surgical History:   Procedure Laterality Date     ARTHROSCOPY KNEE INCISION AND DRAINAGE Left 10/8/2017    Procedure: ARTHROSCOPY KNEE INCISION AND DRAINAGE;  Arthroscopic Incision and Drainage of Left Knee;  Surgeon: Lucretia Munoz MD;  Location: [facility] OR     HC SHOULDER ARTHROSCOPY, DX  2001    Arthroscopy, Shoulder RT Dr OSIRIS Andersen     HC SHOULDER ARTHROSCOPY, DX  1/04    LT arthroscopy Dr OSIRIS Andersen     HERNIA REPAIR, UMBILICAL  1/08    incarcerated - dr rockwell     HERNIORRHAPHY INGUINAL  12/21/2012    HERNIORRHAPHY INGUINAL;  Right Inguinal Hernia Repair with mesh ;  Surgeon: Mello Rockwell MD;  Location: [facility] OR     REPLACE VALVE AORTIC N/A 5/17/2016    REPLACE VALVE AORTIC - Dr Bowers     ROTATOR CUFF REPAIR RT/LT  11/10    Rt arthroscopy - Dr OSIRIS Andersen     SURGICAL HISTORY OF -   5/16    AVR, severe AR, aortic root repair       Physical Exam    General: Intubated, lightly sedated, in no distress   Neck: Supple, no tracheal deviation  Cardiovascular: Regular tachy, nom/r/g  Pumonary: Non labored breathing on MV.  CTAB   Abdomen: Soft, non distended, non tender.   Ext: W/o edema, wwp  Neuro: Small sluggishly reactive pupil bilaterally approximately 2mm - Left oculocephalic & LT Corneal abscent with mild intermittent nystagmus to the RT side     Lab Results   Component Value Date    WBC 9.7 10/25/2017    HGB 9.5 (L) 10/25/2017    HCT 29.3 (L) 10/25/2017     10/25/2017     (H) 10/25/2017    POTASSIUM 4.0 10/25/2017    CHLORIDE 114 (H) 10/25/2017    CO2 24 10/25/2017    BUN 34 (H) 10/25/2017    CR 1.50 (H) 10/25/2017     (H) 10/25/2017     (H) 10/23/2017    DD 1.7 (H) 04/25/2016    NTBNPI 2768 (H) 04/25/2016    NTBNP 1302 (H) 06/03/2016    TROPONIN <0.07  12/05/2005    TROPI 0.634 (HH) 10/08/2017    AST 46 (H) 10/24/2017    ALT 42 10/24/2017    ALKPHOS 85 10/06/2017    BILITOTAL 1.5 (H) 10/06/2017    INR 1.03 10/08/2017           dexmedetomidine 0.6 mcg/kg/hr (10/25/17 1105)     IV fluid REPLACEMENT ONLY           Assessment and Plan: Mr. Cricket Miguel is a 62 year old gentleman w/ h/o AVR and ascending aortic repair in 5/2016 admitted with acute mental status changes found to have large moises-aortic fluid collection and multiple strokes likely septic emboli.  Concern for moises-infarct edema leading to expansion of the cerebellar infarct.    Neuro:     Multifocal CVA: L cerebellar, L MCA, and R occipital regions.  Concern for septic emboli.  Small microhemorrhages were observed.  Progressive edema noted.  Neurosurgery and neurocritical care are following.  Angio showed no evidence of mycotic aneurysms.  Neuro exam stable.    Neurocrit team comments:  From his stroke, expected deficit include some language difficulty, right and left confusion, some trouble with calculation, possible visual field cut, and coordination with gait imbalance. Motor weakness from the stroke should include right hemiparesis/hemiplegia. 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 Neurological prognosis is good with very slow recovery, which will take months.    Physical exam not significantly changed; however, brainstem reflexes are intact.      ASA 81mg    Heparin 5000 U sq q8 hours    Acetaminophen, Ibuprofen, Fentanyl prn    Precedex gtt for anxiety on ventilator    MRI 10/23/17 showed:  IMPRESSION: Evolution of known multiple territory infarcts in the  cerebrum and cerebellum, little changed compared to the most recent CT  10 days ago. However, infarcts are more extensive than seen on the  prior MRI 10/6/2017, particularly in the left parietal lobe and in the  left  middle cerebellar peduncle/left lateral mitchell. No hydrocephalus.  Resp:     Intubated and mechanically ventilated.    Dr Marcelino from ENT will speak with family today about trach placement.    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  1st degree AVB.     Tagged WBC scan showed likely infectious etiology.    Surgical date will be scheduled after patient has shown stabilization and recovery from neurologic insult.    Tachycardia    Metoprolol started 10/19  GI/FEN:     Post pyloric feeding tube in place, keep infusing at goal rate.  Renal:     Monitor electrolytes, Cr, and urine output     Lytes goals K>4 and Mag>2    JOSE:     Monitor Urine Output. Cr      Volume up today, single dose Lasix 40 mg IV given.    Hypernatremia, stable     Continue free water (75mL q1hour).    Goal Na+ 145-155 per NCC    q12 BMP  Endo:    Glucose checks, SSI to serum glucose >80, <180 mg/dL  ID:     Sepsis: MSSA bacteremia with likely source of the L knee septic arthritis. Knee is s/p arthroscopic irrigation and debridement 10/8. Renal function has recovered.  Otherwise hemodynamically stable without pressors.      Nafcillin 2g IV Q4 hours and Levofloxacin 750mg IV Q48 hours, Rifampin 300mg BID.    Gentamicin D/C'd today because patient not scheduled for OR for long time and per ID since patient is now > 2 weeks out from neurolgic insult, no longer needed.      Blood cultures continue to be negative since 10/12.     Nystatin (oral candidiasis)     PET scan 10/19 revealed likely infectious fluid around ascending aorta, plan to go to OR no earlier than week of 10/23    Tooth Remnant, left lower canine    OMF o/b, rec continuing abx and outpatient f/u    Monitor WBC daily   Heme:     Hgb:  stable  Prophylaxis: SCDs, Famotidine  Lines:      CVC 18 days    Espinoza 18 days    A-line 18 days    Rectal Tube 6 days      Dispo: Continue ICU care. Trach soon.     Code Status: Full Code    Jeramie  Pradeep Orellana, DO  Anesthesiology Resident  CVICU Service, *[id number]    Patient seen and staffed with attending.

## 2017-10-25 NOTE — PLAN OF CARE
Problem: Patient Care Overview  Goal: Plan of Care/Patient Progress Review  Outcome: No Change  D/I/A: Patient admitted on 10/6/17 for large moises-aortic fluid collection and multiple strokes.             Neuro- Predecex at 0.2- 0.4 mcg/kg/hr. Pupils round and reactive to light, 2-4 mm. Pupils intermittently irregular. Not tracking. Grimaces with painful stimuli. Withdraws LUE consistently to pain and LLE intermittently to pain. Not following commands.   CV- SR with rare PVCs. Afebrile. SBP <140 maintained with PRN Hydralazine.  Resp- Lung sounds diminished. Vent mode CPAP/PS with PS 7/ PEEP 5.0/ FiO2 35%.    GI- Bowel sounds auscultated X 4. TF at goal with FWF 75 mL/hr due to elevated serum sodium. Rectal tube in place with loose output.   - Condom catheter changed X 1 after leak. UO adequate.   P: Continue to monitor and notify MD of any changes.

## 2017-10-26 ENCOUNTER — APPOINTMENT (OUTPATIENT)
Dept: GENERAL RADIOLOGY | Facility: CLINIC | Age: 62
DRG: 004 | End: 2017-10-26
Attending: PSYCHIATRY & NEUROLOGY
Payer: COMMERCIAL

## 2017-10-26 ENCOUNTER — APPOINTMENT (OUTPATIENT)
Dept: PHYSICAL THERAPY | Facility: CLINIC | Age: 62
DRG: 004 | End: 2017-10-26
Attending: PSYCHIATRY & NEUROLOGY
Payer: COMMERCIAL

## 2017-10-26 LAB
ANION GAP SERPL CALCULATED.3IONS-SCNC: 6 MMOL/L (ref 3–14)
ANION GAP SERPL CALCULATED.3IONS-SCNC: 9 MMOL/L (ref 3–14)
BACTERIA SPEC CULT: NO GROWTH
BACTERIA SPEC CULT: NO GROWTH
BASE EXCESS BLDA CALC-SCNC: 1.8 MMOL/L
BUN SERPL-MCNC: 31 MG/DL (ref 7–30)
BUN SERPL-MCNC: 31 MG/DL (ref 7–30)
CALCIUM SERPL-MCNC: 7.5 MG/DL (ref 8.5–10.1)
CALCIUM SERPL-MCNC: 7.6 MG/DL (ref 8.5–10.1)
CHLORIDE SERPL-SCNC: 109 MMOL/L (ref 94–109)
CHLORIDE SERPL-SCNC: 110 MMOL/L (ref 94–109)
CO2 SERPL-SCNC: 26 MMOL/L (ref 20–32)
CO2 SERPL-SCNC: 27 MMOL/L (ref 20–32)
CREAT SERPL-MCNC: 1.44 MG/DL (ref 0.66–1.25)
CREAT SERPL-MCNC: 1.53 MG/DL (ref 0.66–1.25)
ERYTHROCYTE [DISTWIDTH] IN BLOOD BY AUTOMATED COUNT: 19.7 % (ref 10–15)
GFR SERPL CREATININE-BSD FRML MDRD: 46 ML/MIN/1.7M2
GFR SERPL CREATININE-BSD FRML MDRD: 49 ML/MIN/1.7M2
GLUCOSE BLDC GLUCOMTR-MCNC: 129 MG/DL (ref 70–99)
GLUCOSE BLDC GLUCOMTR-MCNC: 130 MG/DL (ref 70–99)
GLUCOSE BLDC GLUCOMTR-MCNC: 131 MG/DL (ref 70–99)
GLUCOSE BLDC GLUCOMTR-MCNC: 136 MG/DL (ref 70–99)
GLUCOSE BLDC GLUCOMTR-MCNC: 153 MG/DL (ref 70–99)
GLUCOSE BLDC GLUCOMTR-MCNC: 154 MG/DL (ref 70–99)
GLUCOSE BLDC GLUCOMTR-MCNC: 162 MG/DL (ref 70–99)
GLUCOSE SERPL-MCNC: 130 MG/DL (ref 70–99)
GLUCOSE SERPL-MCNC: 135 MG/DL (ref 70–99)
HCO3 BLD-SCNC: 27 MMOL/L (ref 21–28)
HCT VFR BLD AUTO: 29 % (ref 40–53)
HGB BLD-MCNC: 9.1 G/DL (ref 13.3–17.7)
MAGNESIUM SERPL-MCNC: 2.1 MG/DL (ref 1.6–2.3)
MAGNESIUM SERPL-MCNC: 2.2 MG/DL (ref 1.6–2.3)
MCH RBC QN AUTO: 30.2 PG (ref 26.5–33)
MCHC RBC AUTO-ENTMCNC: 31.4 G/DL (ref 31.5–36.5)
MCV RBC AUTO: 96 FL (ref 78–100)
O2/TOTAL GAS SETTING VFR VENT: 35 %
OSMOLALITY SERPL: 298 MMOL/KG (ref 280–301)
OSMOLALITY SERPL: 301 MMOL/KG (ref 280–301)
OSMOLALITY SERPL: 304 MMOL/KG (ref 280–301)
OSMOLALITY SERPL: 305 MMOL/KG (ref 280–301)
OSMOLALITY SERPL: 306 MMOL/KG (ref 280–301)
PCO2 BLD: 43 MM HG (ref 35–45)
PH BLD: 7.4 PH (ref 7.35–7.45)
PHOSPHATE SERPL-MCNC: 3.5 MG/DL (ref 2.5–4.5)
PHOSPHATE SERPL-MCNC: 3.8 MG/DL (ref 2.5–4.5)
PLATELET # BLD AUTO: 319 10E9/L (ref 150–450)
PO2 BLD: 67 MM HG (ref 80–105)
POTASSIUM SERPL-SCNC: 3.4 MMOL/L (ref 3.4–5.3)
POTASSIUM SERPL-SCNC: 3.7 MMOL/L (ref 3.4–5.3)
RBC # BLD AUTO: 3.01 10E12/L (ref 4.4–5.9)
SODIUM SERPL-SCNC: 142 MMOL/L (ref 133–144)
SODIUM SERPL-SCNC: 145 MMOL/L (ref 133–144)
SPECIMEN SOURCE: NORMAL
SPECIMEN SOURCE: NORMAL
WBC # BLD AUTO: 7.5 10E9/L (ref 4–11)

## 2017-10-26 PROCEDURE — 99291 CRITICAL CARE FIRST HOUR: CPT | Mod: GC | Performed by: ANESTHESIOLOGY

## 2017-10-26 PROCEDURE — 25000128 H RX IP 250 OP 636: Performed by: NEUROLOGICAL SURGERY

## 2017-10-26 PROCEDURE — 84100 ASSAY OF PHOSPHORUS: CPT | Performed by: STUDENT IN AN ORGANIZED HEALTH CARE EDUCATION/TRAINING PROGRAM

## 2017-10-26 PROCEDURE — 71010 XR CHEST PORT 1 VW: CPT

## 2017-10-26 PROCEDURE — 83930 ASSAY OF BLOOD OSMOLALITY: CPT | Performed by: ORTHOPAEDIC SURGERY

## 2017-10-26 PROCEDURE — 84100 ASSAY OF PHOSPHORUS: CPT | Performed by: ORTHOPAEDIC SURGERY

## 2017-10-26 PROCEDURE — 25000132 ZZH RX MED GY IP 250 OP 250 PS 637: Performed by: PHYSICIAN ASSISTANT

## 2017-10-26 PROCEDURE — 83735 ASSAY OF MAGNESIUM: CPT | Performed by: STUDENT IN AN ORGANIZED HEALTH CARE EDUCATION/TRAINING PROGRAM

## 2017-10-26 PROCEDURE — 27210429 ZZH NUTRITION PRODUCT INTERMEDIATE LITER

## 2017-10-26 PROCEDURE — 27210913 ZZH NUTRITION PRODUCT INTERMEDIATE PACKET

## 2017-10-26 PROCEDURE — 25000128 H RX IP 250 OP 636: Performed by: THORACIC SURGERY (CARDIOTHORACIC VASCULAR SURGERY)

## 2017-10-26 PROCEDURE — 83735 ASSAY OF MAGNESIUM: CPT | Performed by: ORTHOPAEDIC SURGERY

## 2017-10-26 PROCEDURE — 20000004 ZZH R&B ICU UMMC

## 2017-10-26 PROCEDURE — 97530 THERAPEUTIC ACTIVITIES: CPT | Mod: GP | Performed by: PHYSICAL THERAPIST

## 2017-10-26 PROCEDURE — 25000132 ZZH RX MED GY IP 250 OP 250 PS 637: Performed by: ANESTHESIOLOGY

## 2017-10-26 PROCEDURE — 25000132 ZZH RX MED GY IP 250 OP 250 PS 637: Performed by: STUDENT IN AN ORGANIZED HEALTH CARE EDUCATION/TRAINING PROGRAM

## 2017-10-26 PROCEDURE — 40000014 ZZH STATISTIC ARTERIAL MONITORING DAILY

## 2017-10-26 PROCEDURE — 25000128 H RX IP 250 OP 636: Performed by: ANESTHESIOLOGY

## 2017-10-26 PROCEDURE — 25000132 ZZH RX MED GY IP 250 OP 250 PS 637: Performed by: THORACIC SURGERY (CARDIOTHORACIC VASCULAR SURGERY)

## 2017-10-26 PROCEDURE — 94681 O2 UPTK CO2 OUTP % O2 XTRC: CPT

## 2017-10-26 PROCEDURE — 40000275 ZZH STATISTIC RCP TIME EA 10 MIN

## 2017-10-26 PROCEDURE — 80048 BASIC METABOLIC PNL TOTAL CA: CPT | Performed by: ORTHOPAEDIC SURGERY

## 2017-10-26 PROCEDURE — 25000132 ZZH RX MED GY IP 250 OP 250 PS 637: Performed by: SURGERY

## 2017-10-26 PROCEDURE — 80048 BASIC METABOLIC PNL TOTAL CA: CPT | Performed by: STUDENT IN AN ORGANIZED HEALTH CARE EDUCATION/TRAINING PROGRAM

## 2017-10-26 PROCEDURE — 25000125 ZZHC RX 250: Performed by: STUDENT IN AN ORGANIZED HEALTH CARE EDUCATION/TRAINING PROGRAM

## 2017-10-26 PROCEDURE — 82803 BLOOD GASES ANY COMBINATION: CPT | Performed by: ORTHOPAEDIC SURGERY

## 2017-10-26 PROCEDURE — 94003 VENT MGMT INPAT SUBQ DAY: CPT

## 2017-10-26 PROCEDURE — 40000193 ZZH STATISTIC PT WARD VISIT: Performed by: PHYSICAL THERAPIST

## 2017-10-26 PROCEDURE — 85027 COMPLETE CBC AUTOMATED: CPT | Performed by: ORTHOPAEDIC SURGERY

## 2017-10-26 PROCEDURE — 00000146 ZZHCL STATISTIC GLUCOSE BY METER IP

## 2017-10-26 PROCEDURE — S0032 INJECTION, NAFCILLIN SODIUM: HCPCS | Performed by: THORACIC SURGERY (CARDIOTHORACIC VASCULAR SURGERY)

## 2017-10-26 PROCEDURE — 25000125 ZZHC RX 250: Performed by: THORACIC SURGERY (CARDIOTHORACIC VASCULAR SURGERY)

## 2017-10-26 PROCEDURE — 83930 ASSAY OF BLOOD OSMOLALITY: CPT | Performed by: STUDENT IN AN ORGANIZED HEALTH CARE EDUCATION/TRAINING PROGRAM

## 2017-10-26 RX ORDER — OXYCODONE HYDROCHLORIDE 5 MG/1
5 TABLET ORAL EVERY 4 HOURS PRN
Status: DISCONTINUED | OUTPATIENT
Start: 2017-10-26 | End: 2017-10-29

## 2017-10-26 RX ADMIN — METOPROLOL TARTRATE 12.5 MG: 100 TABLET, FILM COATED ORAL at 19:49

## 2017-10-26 RX ADMIN — INSULIN ASPART 1 UNITS: 100 INJECTION, SOLUTION INTRAVENOUS; SUBCUTANEOUS at 12:17

## 2017-10-26 RX ADMIN — NAFCILLIN: 10 INJECTION, POWDER, FOR SOLUTION INTRAVENOUS at 23:50

## 2017-10-26 RX ADMIN — MULTIVITAMIN 15 ML: LIQUID ORAL at 08:14

## 2017-10-26 RX ADMIN — ASPIRIN 81 MG CHEWABLE TABLET 81 MG: 81 TABLET CHEWABLE at 08:14

## 2017-10-26 RX ADMIN — NYSTATIN 500000 UNITS: 100000 SUSPENSION ORAL at 02:06

## 2017-10-26 RX ADMIN — ONDANSETRON 4 MG: 2 INJECTION INTRAMUSCULAR; INTRAVENOUS at 15:06

## 2017-10-26 RX ADMIN — NAFCILLIN: 10 INJECTION, POWDER, FOR SOLUTION INTRAVENOUS at 15:16

## 2017-10-26 RX ADMIN — HYDRALAZINE HYDROCHLORIDE 10 MG: 20 INJECTION INTRAMUSCULAR; INTRAVENOUS at 20:28

## 2017-10-26 RX ADMIN — HEPARIN SODIUM 5000 UNITS: 5000 INJECTION, SOLUTION INTRAVENOUS; SUBCUTANEOUS at 12:14

## 2017-10-26 RX ADMIN — NAFCILLIN: 10 INJECTION, POWDER, FOR SOLUTION INTRAVENOUS at 08:15

## 2017-10-26 RX ADMIN — NYSTATIN 500000 UNITS: 100000 SUSPENSION ORAL at 08:17

## 2017-10-26 RX ADMIN — Medication: at 20:07

## 2017-10-26 RX ADMIN — INSULIN ASPART 1 UNITS: 100 INJECTION, SOLUTION INTRAVENOUS; SUBCUTANEOUS at 23:46

## 2017-10-26 RX ADMIN — Medication 1 PACKET: at 08:15

## 2017-10-26 RX ADMIN — NAFCILLIN: 10 INJECTION, POWDER, FOR SOLUTION INTRAVENOUS at 04:00

## 2017-10-26 RX ADMIN — NAFCILLIN: 10 INJECTION, POWDER, FOR SOLUTION INTRAVENOUS at 20:22

## 2017-10-26 RX ADMIN — NAFCILLIN: 10 INJECTION, POWDER, FOR SOLUTION INTRAVENOUS at 12:14

## 2017-10-26 RX ADMIN — INSULIN ASPART 1 UNITS: 100 INJECTION, SOLUTION INTRAVENOUS; SUBCUTANEOUS at 00:18

## 2017-10-26 RX ADMIN — METOPROLOL TARTRATE 12.5 MG: 100 TABLET, FILM COATED ORAL at 08:18

## 2017-10-26 RX ADMIN — ACETAMINOPHEN 975 MG: 325 SOLUTION ORAL at 00:19

## 2017-10-26 RX ADMIN — OXYCODONE HYDROCHLORIDE 5 MG: 5 TABLET ORAL at 12:21

## 2017-10-26 RX ADMIN — DEXMEDETOMIDINE HYDROCHLORIDE 0.4 MCG/KG/HR: 4 INJECTION, SOLUTION INTRAVENOUS at 14:12

## 2017-10-26 RX ADMIN — Medication 300 MG: at 08:14

## 2017-10-26 RX ADMIN — POTASSIUM CHLORIDE 20 MEQ: 1.5 POWDER, FOR SOLUTION ORAL at 18:23

## 2017-10-26 RX ADMIN — Medication: at 13:05

## 2017-10-26 RX ADMIN — OXYCODONE HYDROCHLORIDE 5 MG: 5 TABLET ORAL at 23:27

## 2017-10-26 RX ADMIN — POTASSIUM CHLORIDE 20 MEQ: 1.5 POWDER, FOR SOLUTION ORAL at 06:19

## 2017-10-26 RX ADMIN — ACETAMINOPHEN 975 MG: 325 SOLUTION ORAL at 23:27

## 2017-10-26 RX ADMIN — ACETAMINOPHEN 975 MG: 325 SOLUTION ORAL at 08:14

## 2017-10-26 RX ADMIN — HEPARIN SODIUM 5000 UNITS: 5000 INJECTION, SOLUTION INTRAVENOUS; SUBCUTANEOUS at 04:12

## 2017-10-26 RX ADMIN — DEXMEDETOMIDINE HYDROCHLORIDE 0.4 MCG/KG/HR: 4 INJECTION, SOLUTION INTRAVENOUS at 04:06

## 2017-10-26 RX ADMIN — NYSTATIN 500000 UNITS: 100000 SUSPENSION ORAL at 20:19

## 2017-10-26 RX ADMIN — FAMOTIDINE 20 MG: 40 POWDER, FOR SUSPENSION ORAL at 08:14

## 2017-10-26 RX ADMIN — ACETAMINOPHEN 975 MG: 325 SOLUTION ORAL at 15:16

## 2017-10-26 RX ADMIN — HEPARIN SODIUM 5000 UNITS: 5000 INJECTION, SOLUTION INTRAVENOUS; SUBCUTANEOUS at 19:54

## 2017-10-26 RX ADMIN — LEVOFLOXACIN 750 MG: 5 INJECTION, SOLUTION INTRAVENOUS at 10:57

## 2017-10-26 RX ADMIN — NYSTATIN 500000 UNITS: 100000 SUSPENSION ORAL at 13:09

## 2017-10-26 RX ADMIN — DEXMEDETOMIDINE HYDROCHLORIDE 0.6 MCG/KG/HR: 4 INJECTION, SOLUTION INTRAVENOUS at 23:23

## 2017-10-26 RX ADMIN — Medication 300 MG: at 19:49

## 2017-10-26 RX ADMIN — HYDRALAZINE HYDROCHLORIDE 10 MG: 20 INJECTION INTRAMUSCULAR; INTRAVENOUS at 12:23

## 2017-10-26 RX ADMIN — NAFCILLIN: 10 INJECTION, POWDER, FOR SOLUTION INTRAVENOUS at 00:25

## 2017-10-26 RX ADMIN — FENTANYL CITRATE 50 MCG: 50 INJECTION, SOLUTION INTRAMUSCULAR; INTRAVENOUS at 03:45

## 2017-10-26 NOTE — PROGRESS NOTES
CV ICU Progress Note    POD: 18 Days Post-Op  LOS: 20    Admission History: Mr. Cricket Miguel is a 62 year old gentleman w/ h/o AVR and ascending aortic repair in 2016 admitted with acute mental status changes found to have large moises-aortic fluid collection and multiple strokes likely septic emboli.  MRI findings showed left posterior MCA, left PICA, and small right PCA infarcts.    S/Interval History:   No significant changes overnight.      O:    Temp: 97.6  F (36.4  C) Temp  Min: 97.6  F (36.4  C)  Max: 99.2  F (37.3  C)  Resp: 16 Resp  Min: 8  Max: 27  SpO2: 100 % SpO2  Min: 96 %  Max: 100 %    No Data Recorded  Heart Rate: 73 Heart Rate  Min: 69  Max: 105  BP: 131/87 Systolic (24hrs), Av , Min:100 , Max:131   Diastolic (24hrs), Av, Min:69, Max:87    Ventilation Mode: CPAP/PS  FiO2 (%): 35 %  PEEP (cm H2O): 5 cmH2O  Pressure Support (cm H2O): 7 cmH2O  Oxygen Concentration (%): 35 %  Resp: 16  Date 10/16/17 0700 - 10/17/17 0659   Shift 6202-6000 0471-6428 8739-4026 24 Hour Total   I  N  T  A  K  E   I.V. 114.6   114.6    NG/   400    Enteral 240   240    Shift Total  (mL/kg) 754.6  (8.34)   754.6  (8.34)   O  U  T  P  U  T   Urine 575   575    Shift Total  (mL/kg) 575  (6.35)   575  (6.35)   Weight (kg) 90.5 90.5 90.5 90.5         Past Medical History:   Diagnosis Date     AI (aortic insufficiency)      Aortic root dilatation (H)      AR (aortic regurgitation)     severe     Cardiomyopathy (H)      CHF (congestive heart failure), NYHA class II (H)      COPD, mild (H)     spirometry      Heart murmur      Hyperlipidemia LDL goal <70 10/31/2010     Hypertension goal BP (blood pressure) < 140/90      Inguinal hernia      left lung nodule  2008     Major depressive disorder, single episode, moderate (H)      Psoriasis childhood     Tobacco use disorder        Past Surgical History:   Procedure Laterality Date     ARTHROSCOPY KNEE INCISION AND DRAINAGE Left 10/8/2017    Procedure:  ARTHROSCOPY KNEE INCISION AND DRAINAGE;  Arthroscopic Incision and Drainage of Left Knee;  Surgeon: Lucretia Munoz MD;  Location: [facility] OR     HC SHOULDER ARTHROSCOPY, DX  2001    Arthroscopy, Shoulder RT Dr OSIRIS Andersen     HC SHOULDER ARTHROSCOPY, DX  1/04    LT arthroscopy Dr OSIRIS Andersen     HERNIA REPAIR, UMBILICAL  1/08    incarcerated - dr rockwell     HERNIORRHAPHY INGUINAL  12/21/2012    HERNIORRHAPHY INGUINAL;  Right Inguinal Hernia Repair with mesh ;  Surgeon: Mello Rockwell MD;  Location: [facility] OR     REPLACE VALVE AORTIC N/A 5/17/2016    REPLACE VALVE AORTIC - Dr Bowers     ROTATOR CUFF REPAIR RT/LT  11/10    Rt arthroscopy - Dr OSIRIS Andersen     SURGICAL HISTORY OF -   5/16    AVR, severe AR, aortic root repair       Physical Exam    General: Intubated, lightly sedated, in no distress   Neck: Supple, no tracheal deviation  Cardiovascular: Regular tachy, nom/r/g  Pumonary: Non labored breathing on MV.  CTAB   Abdomen: Soft, non distended, non tender.   Ext: W/o edema, wwp  Neuro: Small sluggishly reactive pupil bilaterally approximately 2mm - Left oculocephalic & LT Corneal abscent with mild intermittent nystagmus to the RT side     Lab Results   Component Value Date    WBC 7.5 10/26/2017    HGB 9.1 (L) 10/26/2017    HCT 29.0 (L) 10/26/2017     10/26/2017     (H) 10/26/2017    POTASSIUM 3.4 10/26/2017    CHLORIDE 110 (H) 10/26/2017    CO2 26 10/26/2017    BUN 31 (H) 10/26/2017    CR 1.53 (H) 10/26/2017     (H) 10/26/2017     (H) 10/23/2017    DD 1.7 (H) 04/25/2016    NTBNPI 2768 (H) 04/25/2016    NTBNP 1302 (H) 06/03/2016    TROPONIN <0.07 12/05/2005    TROPI 0.634 (HH) 10/08/2017    AST 46 (H) 10/24/2017    ALT 42 10/24/2017    ALKPHOS 85 10/06/2017    BILITOTAL 1.5 (H) 10/06/2017    INR 1.03 10/08/2017           dexmedetomidine 0.4 mcg/kg/hr (10/26/17 0700)     IV fluid REPLACEMENT ONLY           Assessment and Plan: Mr. Cricket Miguel is a 62 year old gentleman w/ h/o AVR and  ascending aortic repair in 5/2016 admitted with acute mental status changes found to have large moises-aortic fluid collection and multiple strokes likely septic emboli.  Concern for moises-infarct edema leading to expansion of the cerebellar infarct.    Neuro:     Multifocal CVA: L cerebellar, L MCA, and R occipital regions.  Concern for septic emboli.  Small microhemorrhages were observed.  Progressive edema noted.  Neurosurgery and neurocritical care are following.  Angio showed no evidence of mycotic aneurysms.  Neuro exam stable.    Neurocrit team comments:  From his stroke, expected deficit include some language difficulty, right and left confusion, some trouble with calculation, possible visual field cut, and coordination with gait imbalance. Motor weakness from the stroke should include right hemiparesis/hemiplegia. 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 Neurological prognosis is good with very slow recovery, which will take months.    Physical exam not significantly changed; however, brainstem reflexes are intact.      ASA 81mg    Heparin 5000 U sq q8 hours    Acetaminophen, Ibuprofen, Fentanyl prn    Precedex gtt for anxiety on ventilator    Resp:     Intubated and mechanically ventilated.    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  1st degree AVB.     Tagged WBC scan showed likely infectious etiology.    Surgical date will be scheduled after patient has shown stabilization and recovery from neurologic insult.    Tachycardia    Metoprolol started 10/19  GI/FEN:     Post pyloric feeding tube in place, keep infusing at goal rate.    Family discussing PEG   Renal:     Monitor electrolytes, Cr, and urine output     Lytes goals K>4 and Mag>2    JOSE:     Monitor Urine Output. Cr       Volume up today, single dose Lasix 40 mg IV given.    Hypernatremia, stable     Continue free water (75mL q1hour).    Goal Na+ 145-155 per [facility]    q12 BMP.  If next Na WNL, D/C FWF and switch BMP to daily  Endo:    Glucose checks, SSI to serum glucose >80, <180 mg/dL  ID:     Sepsis: MSSA bacteremia with likely source of the L knee septic arthritis. Knee is s/p arthroscopic irrigation and debridement 10/8. Renal function has recovered.  Otherwise hemodynamically stable without pressors.      ID consultant recommended:    Continue Nafcillin & Rifampin a minimum of 3 months and even longer if pt hasn't had surgery by that point (i.e. Continue through surgery)    Continue Levofloxacin for 4 weeks from start of therapy (10/07-11/04)    Stop Gentamicin    F/U in ID clinic if discharged to LTAC     Nystatin (oral candidiasis)     PET scan 10/19 revealed likely infectious fluid around ascending aorta    Tooth Remnant, left lower canine    OMF o/b, rec continuing abx and outpatient f/u    Monitor WBC daily   Heme:     Hgb:  stable  Prophylaxis: SCDs, Famotidine  Lines:      CVC 19 days    Espinoza 19 days    A-line 19 days    Rectal Tube 7 days      Dispo: Continue ICU care. Trach soon 10/27/17.    Code Status: Full Code    Jeramie Orellana DO  Anesthesiology Resident  CVICU Service, *[id number]    Patient seen and staffed with attending.

## 2017-10-26 NOTE — PROGRESS NOTES
[facility] ID SERVICE: ONGOING CONSULTATION   Cricket Miguel : 1955 Sex: male:   Medical record number [id number] Attending Physician: Ramesh Kuo, *  Date of Service: 10/25/2017    ID PROBLEM LIST:  1. MSSA prosthetic aortic valve endocarditis with fluid collection noted in the aortic root and ascending thoracic aorta  2. Left knee pain status post aspriration 10/6; wbc count not consistent with septic arthritis (3,897) but cultures grew out MSSA. Status post I&D 10/8 with negative cultures.   3. Multiple bilateral CNS infarcts, presumed due to septic emboli  4. Ongoing critical illness with mechanical ventilation  5. Acute on CKD - stable       RECOMMENDATIONS:   1. Continue nafcillin and rifampin. We anticipate a prolonged course - at least three months, but I anticipate longer.  We would like it go through his surgery and we will make a decision about duration after that procedure based on intraoperative findings.    2. Continue Levofloxacin for a total duration of 4 weeks from the start of therapy.   3. I agree that Gentamicin can be stopped.   3. If discharged to a facility, we would like follow up in the ID clinic prior to his procedure and then we should be re-consulted when he is an inpatient for his operation.     DISCUSSION:  63 yo male status post Bentall procedure in 2016 (graft replacement of the aortic valve, aortic root and ascending aorta) admitted 10/6 for evaluation of change in mental status found to have MSSA bacteremia, multiple bilateral CNS infarcts, fluid collection around the Ao root and ascending thoracic aorta, and a swollen left knee with MSSA on culture status post I&D. Although cultures have cleared, WBC is down and fevers improved, unlikely that he will clear this infection without surgical intervention. This has been postponed for a couple months based on his neurologic status. He will need prolonged Nafcillin therapy (of note, I would not recommend a  "substitution of Nafcillin for Cefazolin because of his CNS involvement). The levofloxacin was added for increased staphylococcal activity in the CNS.  This can be continued for a total of 4 weeks from the start of this medication.  Nafcillin does have good CNS penetration so beyond 4 weeks, it will be sufficient treatment in combination with rifampin.     Attestation:  This patient has been seen and evaluated by me, Marianna Vasquez MD. The plan was discussed with his partner and the primary team. I have reviewed today's vital signs, medications, labs and imaging.     ID will sign off.  Please contact us with questions or concerns.     Marianna Philip MD MPH  Infectious Diseases  Pager [phone]    _____________________________________________________________________________________  SUBJECTIVE:   Mr. Miguel continues to be afebrile. His procedure has been postpones secondary to his neurologic status. He has had no concerning rashes or wounds per the nursing staff.  + loose stools, but no clear diarrhea. His gentamicin has been stopped.  He continues to have progressively improving renal status. Mr. Miguel is being consider for tracheostomy placement.     ROS:  Unable to obtain given intubation/sedation    Allergies   Allergen Reactions     Lisinopril Swelling     One-sided facial swelling after being on lisinopril/HCTZ for one week.      CURRENT ANTI-INFECTIVES:   Nafcillin, start 10/7  Gentamicin, start 10/6  Levofloxacin, start 10/7  Rifampin, start 10/19    EXAMINATION: (Recommend ? 5 systems)   Vital Signs: /87  Pulse 82  Temp 98.6  F (37  C)  Resp 13  Ht 1.727 m (5' 8\")  Wt 94.9 kg (209 lb 3.5 oz)  SpO2 100%  BMI 31.81 kg/m2   GENERAL:  Awake, intubated   EYES: No icturus  ENT:  Intubated, thin secretions   LUNGS: Course BS bilaterally  CARDIOVASCULAR:  Regular rate and rhythm, no M/R/G noted today, + diffuse peripheral edema.  ABDOMEN:  Normal bowel sounds, soft, nontender.   SKIN:  " No acute rashes. No wounds noted  MS: Left knee with sutures in place, no warmth or erythema  NEUROLOGIC: Opens eyes spontaneously and seems to respond to negative stimuli    NEW DATA/RESULTS:   Culture Micro   Date Value Ref Range Status   10/21/2017 No growth after 4 days  Preliminary   10/21/2017 No growth after 4 days  Preliminary   10/20/2017 No growth after 5 days  Preliminary   10/20/2017 No growth after 5 days  Preliminary   10/19/2017 No growth  Final       Recent Labs   Lab Test  10/23/17   0341  10/21/17   0335  10/16/17   0323  10/09/17   0316  10/07/17   1333  10/06/17   1859   CRP  30.0*  44.0*  48.0*  240.0*  290.0*  310.0*     Recent Labs   Lab Test  10/25/17   0346  10/24/17   0405  10/23/17   0341  10/22/17   0338  10/21/17   0357  10/20/17   0412   WBC  9.7  7.8  9.2  10.2  10.9  13.5*     Recent Labs   Lab Test  10/25/17   1550  10/25/17   0346  10/24/17   2242  10/24/17   1617   CR  1.51*  1.50*  1.46*  1.52*   GFRESTIMATED  47*  47*  49*  46*     Hematology Studies  Recent Labs   Lab Test  10/25/17   0346  10/24/17   0405  10/23/17   0341  10/22/17   0338   10/21/17   0357  10/20/17   0412   10/06/17   1020   05/06/13   1703  12/04/12   1016  02/02/11   1506   11/06/09   1155  11/02/09   1717   WBC  9.7  7.8  9.2  10.2   --   10.9  13.5*   < >  17.1*   < >  10.6  11.9*  11.9*   < >  9.2  12.1*   ANEU   --    --    --    --    --    --    --    --   15.7*   --   7.2  7.9  8.2   --   5.9  8.3   AEOS   --    --    --    --    --    --    --    --   0.0   --   0.0  0.4  0.3   --   0.3  0.3   HCT  29.3*  21.8*  22.9*  23.3*   --   23.0*  24.4*   < >  43.8   < >  46.2  47.4  46.8   < >  47.1  45.9   PLT  340  355  379  368   < >  344  334   < >  185   < >  315  259  315   < >  303  312    < > = values in this interval not displayed.     Metabolic  Recent Labs   Lab Test  10/25/17   1550  10/25/17   0346  10/24/17   2242   NA  144  147*  144   BUN  32*  34*  33*   CO2  25  24  24   CR  1.51*  1.50*   1.46*   GFRESTIMATED  47*  47*  49*     Hepatic Studies  Recent Labs   Lab Test  10/24/17   0405  10/21/17   0335  10/06/17   1020  06/03/16   1716  04/26/16   0654   BILITOTAL   --    --   1.5*  0.3  0.6   ALKPHOS   --    --   85  104  91   ALBUMIN   --    --   2.8*  3.4  3.1*   AST  46*  52*  24  19  20   ALT  42  35  33  32  26     Immunologlobulins  Recent Labs   Lab Test  10/23/17   0341  10/21/17   0357  05/06/13   1703   SED  122*  108*  6     Imaging:  Recent Results (from the past 48 hour(s))   XR Chest Port 1 View    Narrative    XR CHEST PORT 1 VW 10/25/2017 12:41 AM    History: monitor ett    Comparison: Chest x-ray on 10/23/2017.    Findings: Single view of the chest was obtained. Postsurgical changes  of cardiac surgery. Endotracheal tube tip projects over mid thoracic  trachea. Enteric tube crosses the diaphragm with the distal tip  outside the field of view. Right upper extremity PICC tip projects  over lower SVC. Stable cardiomediastinal silhouette. Mild pulmonary  vascular congestion. No significant pleural effusion or pneumothorax.  Streaky atelectasis in the left lower lobe, slightly increased from  the prior study. The visualized upper abdomen is unremarkable. Median  sternotomy wires.      Impression    Impression:   1. Endotracheal tube tip projects over mid thoracic trachea.  2. Stable enlargement off the cardiomediastinal silhouette and mild  pulmonary vascular congestion.  3. Streaky atelectasis in the left lower lobe, slightly increased from  the prior study.    I have personally reviewed the examination and initial interpretation  and I agree with the findings.    RICHARD MACIAS MD

## 2017-10-26 NOTE — PLAN OF CARE
Problem: Patient Care Overview  Goal: Plan of Care/Patient Progress Review  PT 4A     Discharge Planner PT   Patient plan for discharge: unable to state  Current status: requires max assist to roll, lift dependent for bed <> chair, sat at EOB with max assist and sling support.  Follows simple commands during sitting activities.   Barriers to return to prior living situation: assistance level needed for basic mobility  Recommendations for discharge: inpatient rehab setting  Rationale for recommendations: limited tolerance to activity and dependence with functional mobility.        Entered by: Aryan Mclaughlin 10/26/2017 2:39 PM

## 2017-10-26 NOTE — PLAN OF CARE
Problem: Patient Care Overview  Goal: Plan of Care/Patient Progress Review  Outcome: No Change  S: x2 elevated BP with grimacing, brow furrowed, and slight nod to pain query. Received Fentanyl 100mg with apnea alarms on vent, later 50mg with same response. BP returned to <140 and rested post pain meds.  B: Sepsis/CVA.  A: No change.  R: Consider change to med via FT for pain instead IV fentanyl. Replace K+. Suction as needed.

## 2017-10-27 ENCOUNTER — APPOINTMENT (OUTPATIENT)
Dept: GENERAL RADIOLOGY | Facility: CLINIC | Age: 62
DRG: 004 | End: 2017-10-27
Attending: PSYCHIATRY & NEUROLOGY
Payer: COMMERCIAL

## 2017-10-27 ENCOUNTER — ANESTHESIA EVENT (OUTPATIENT)
Dept: INTENSIVE CARE | Facility: CLINIC | Age: 62
DRG: 004 | End: 2017-10-27
Payer: COMMERCIAL

## 2017-10-27 ENCOUNTER — ANESTHESIA (OUTPATIENT)
Dept: INTENSIVE CARE | Facility: CLINIC | Age: 62
DRG: 004 | End: 2017-10-27
Payer: COMMERCIAL

## 2017-10-27 LAB
ANION GAP SERPL CALCULATED.3IONS-SCNC: 6 MMOL/L (ref 3–14)
BACTERIA SPEC CULT: NO GROWTH
BACTERIA SPEC CULT: NO GROWTH
BUN SERPL-MCNC: 30 MG/DL (ref 7–30)
CALCIUM SERPL-MCNC: 7.8 MG/DL (ref 8.5–10.1)
CHLORIDE SERPL-SCNC: 109 MMOL/L (ref 94–109)
CO2 SERPL-SCNC: 27 MMOL/L (ref 20–32)
CREAT SERPL-MCNC: 1.46 MG/DL (ref 0.66–1.25)
ERYTHROCYTE [DISTWIDTH] IN BLOOD BY AUTOMATED COUNT: 19.7 % (ref 10–15)
GFR SERPL CREATININE-BSD FRML MDRD: 49 ML/MIN/1.7M2
GLUCOSE BLDC GLUCOMTR-MCNC: 106 MG/DL (ref 70–99)
GLUCOSE BLDC GLUCOMTR-MCNC: 130 MG/DL (ref 70–99)
GLUCOSE BLDC GLUCOMTR-MCNC: 145 MG/DL (ref 70–99)
GLUCOSE BLDC GLUCOMTR-MCNC: 149 MG/DL (ref 70–99)
GLUCOSE BLDC GLUCOMTR-MCNC: 151 MG/DL (ref 70–99)
GLUCOSE SERPL-MCNC: 164 MG/DL (ref 70–99)
HCT VFR BLD AUTO: 27.7 % (ref 40–53)
HGB BLD-MCNC: 8.7 G/DL (ref 13.3–17.7)
LACTATE BLD-SCNC: 1.2 MMOL/L (ref 0.7–2)
MAGNESIUM SERPL-MCNC: 2.1 MG/DL (ref 1.6–2.3)
MAGNESIUM SERPL-MCNC: 2.2 MG/DL (ref 1.6–2.3)
MCH RBC QN AUTO: 30.6 PG (ref 26.5–33)
MCHC RBC AUTO-ENTMCNC: 31.4 G/DL (ref 31.5–36.5)
MCV RBC AUTO: 98 FL (ref 78–100)
OSMOLALITY SERPL: 300 MMOL/KG (ref 280–301)
PHOSPHATE SERPL-MCNC: 3.4 MG/DL (ref 2.5–4.5)
PHOSPHATE SERPL-MCNC: 4.3 MG/DL (ref 2.5–4.5)
PLATELET # BLD AUTO: 309 10E9/L (ref 150–450)
POTASSIUM SERPL-SCNC: 3.7 MMOL/L (ref 3.4–5.3)
POTASSIUM SERPL-SCNC: 4.1 MMOL/L (ref 3.4–5.3)
RBC # BLD AUTO: 2.84 10E12/L (ref 4.4–5.9)
SODIUM SERPL-SCNC: 142 MMOL/L (ref 133–144)
SPECIMEN SOURCE: NORMAL
SPECIMEN SOURCE: NORMAL
WBC # BLD AUTO: 6.8 10E9/L (ref 4–11)

## 2017-10-27 PROCEDURE — 25000132 ZZH RX MED GY IP 250 OP 250 PS 637: Performed by: STUDENT IN AN ORGANIZED HEALTH CARE EDUCATION/TRAINING PROGRAM

## 2017-10-27 PROCEDURE — 25000128 H RX IP 250 OP 636: Performed by: STUDENT IN AN ORGANIZED HEALTH CARE EDUCATION/TRAINING PROGRAM

## 2017-10-27 PROCEDURE — 25000128 H RX IP 250 OP 636: Performed by: ANESTHESIOLOGY

## 2017-10-27 PROCEDURE — 83735 ASSAY OF MAGNESIUM: CPT | Performed by: STUDENT IN AN ORGANIZED HEALTH CARE EDUCATION/TRAINING PROGRAM

## 2017-10-27 PROCEDURE — 0B113F4 BYPASS TRACHEA TO CUTANEOUS WITH TRACHEOSTOMY DEVICE, PERCUTANEOUS APPROACH: ICD-10-PCS | Performed by: SURGERY

## 2017-10-27 PROCEDURE — 25000128 H RX IP 250 OP 636: Performed by: THORACIC SURGERY (CARDIOTHORACIC VASCULAR SURGERY)

## 2017-10-27 PROCEDURE — 94003 VENT MGMT INPAT SUBQ DAY: CPT

## 2017-10-27 PROCEDURE — 83930 ASSAY OF BLOOD OSMOLALITY: CPT | Performed by: STUDENT IN AN ORGANIZED HEALTH CARE EDUCATION/TRAINING PROGRAM

## 2017-10-27 PROCEDURE — 40000275 ZZH STATISTIC RCP TIME EA 10 MIN

## 2017-10-27 PROCEDURE — 40000671 ZZH STATISTIC ANESTHESIA CASE

## 2017-10-27 PROCEDURE — 25000132 ZZH RX MED GY IP 250 OP 250 PS 637: Performed by: SURGERY

## 2017-10-27 PROCEDURE — 85027 COMPLETE CBC AUTOMATED: CPT | Performed by: ORTHOPAEDIC SURGERY

## 2017-10-27 PROCEDURE — 25000132 ZZH RX MED GY IP 250 OP 250 PS 637: Performed by: PHYSICIAN ASSISTANT

## 2017-10-27 PROCEDURE — S0032 INJECTION, NAFCILLIN SODIUM: HCPCS | Performed by: THORACIC SURGERY (CARDIOTHORACIC VASCULAR SURGERY)

## 2017-10-27 PROCEDURE — P9041 ALBUMIN (HUMAN),5%, 50ML: HCPCS | Performed by: STUDENT IN AN ORGANIZED HEALTH CARE EDUCATION/TRAINING PROGRAM

## 2017-10-27 PROCEDURE — P9041 ALBUMIN (HUMAN),5%, 50ML: HCPCS | Performed by: ANESTHESIOLOGY

## 2017-10-27 PROCEDURE — 25000132 ZZH RX MED GY IP 250 OP 250 PS 637: Performed by: ANESTHESIOLOGY

## 2017-10-27 PROCEDURE — 27210913 ZZH NUTRITION PRODUCT INTERMEDIATE PACKET

## 2017-10-27 PROCEDURE — 83735 ASSAY OF MAGNESIUM: CPT | Performed by: ORTHOPAEDIC SURGERY

## 2017-10-27 PROCEDURE — 25000128 H RX IP 250 OP 636: Performed by: NEUROLOGICAL SURGERY

## 2017-10-27 PROCEDURE — 99291 CRITICAL CARE FIRST HOUR: CPT | Mod: GC | Performed by: ANESTHESIOLOGY

## 2017-10-27 PROCEDURE — 71010 XR CHEST PORT 1 VW: CPT

## 2017-10-27 PROCEDURE — 84100 ASSAY OF PHOSPHORUS: CPT | Performed by: ORTHOPAEDIC SURGERY

## 2017-10-27 PROCEDURE — 80048 BASIC METABOLIC PNL TOTAL CA: CPT | Performed by: STUDENT IN AN ORGANIZED HEALTH CARE EDUCATION/TRAINING PROGRAM

## 2017-10-27 PROCEDURE — 31600 PLANNED TRACHEOSTOMY: CPT

## 2017-10-27 PROCEDURE — 25000132 ZZH RX MED GY IP 250 OP 250 PS 637: Performed by: THORACIC SURGERY (CARDIOTHORACIC VASCULAR SURGERY)

## 2017-10-27 PROCEDURE — 00000146 ZZHCL STATISTIC GLUCOSE BY METER IP

## 2017-10-27 PROCEDURE — 83605 ASSAY OF LACTIC ACID: CPT | Performed by: ANESTHESIOLOGY

## 2017-10-27 PROCEDURE — 25000125 ZZHC RX 250: Performed by: THORACIC SURGERY (CARDIOTHORACIC VASCULAR SURGERY)

## 2017-10-27 PROCEDURE — 25000125 ZZHC RX 250: Performed by: STUDENT IN AN ORGANIZED HEALTH CARE EDUCATION/TRAINING PROGRAM

## 2017-10-27 PROCEDURE — 20000004 ZZH R&B ICU UMMC

## 2017-10-27 PROCEDURE — 84100 ASSAY OF PHOSPHORUS: CPT | Performed by: STUDENT IN AN ORGANIZED HEALTH CARE EDUCATION/TRAINING PROGRAM

## 2017-10-27 PROCEDURE — 40000014 ZZH STATISTIC ARTERIAL MONITORING DAILY

## 2017-10-27 PROCEDURE — 84132 ASSAY OF SERUM POTASSIUM: CPT | Performed by: ORTHOPAEDIC SURGERY

## 2017-10-27 PROCEDURE — 40000986 XR CHEST PORT 1 VW

## 2017-10-27 RX ORDER — ALBUMIN, HUMAN INJ 5% 5 %
500 SOLUTION INTRAVENOUS ONCE
Status: COMPLETED | OUTPATIENT
Start: 2017-10-27 | End: 2017-10-27

## 2017-10-27 RX ORDER — ALBUMIN, HUMAN INJ 5% 5 %
SOLUTION INTRAVENOUS
Status: DISCONTINUED
Start: 2017-10-27 | End: 2017-10-27 | Stop reason: HOSPADM

## 2017-10-27 RX ORDER — FENTANYL CITRATE 50 UG/ML
50-100 INJECTION, SOLUTION INTRAMUSCULAR; INTRAVENOUS ONCE
Status: COMPLETED | OUTPATIENT
Start: 2017-10-27 | End: 2017-10-27

## 2017-10-27 RX ORDER — PROPOFOL 10 MG/ML
5-75 INJECTION, EMULSION INTRAVENOUS CONTINUOUS
Status: DISCONTINUED | OUTPATIENT
Start: 2017-10-27 | End: 2017-10-27

## 2017-10-27 RX ORDER — PROPOFOL 10 MG/ML
INJECTION, EMULSION INTRAVENOUS PRN
Status: DISCONTINUED | OUTPATIENT
Start: 2017-10-27 | End: 2017-10-27

## 2017-10-27 RX ORDER — FENTANYL CITRATE 50 UG/ML
300 INJECTION, SOLUTION INTRAMUSCULAR; INTRAVENOUS
Status: DISCONTINUED | OUTPATIENT
Start: 2017-10-27 | End: 2017-10-30

## 2017-10-27 RX ORDER — ALBUMIN, HUMAN INJ 5% 5 %
25 SOLUTION INTRAVENOUS ONCE
Status: COMPLETED | OUTPATIENT
Start: 2017-10-27 | End: 2017-10-27

## 2017-10-27 RX ORDER — CARVEDILOL 12.5 MG/1
12.5 TABLET ORAL 2 TIMES DAILY WITH MEALS
Status: DISCONTINUED | OUTPATIENT
Start: 2017-10-27 | End: 2017-10-27

## 2017-10-27 RX ADMIN — POTASSIUM CHLORIDE 20 MEQ: 1.5 POWDER, FOR SOLUTION ORAL at 05:49

## 2017-10-27 RX ADMIN — Medication 300 MG: at 19:53

## 2017-10-27 RX ADMIN — Medication 300 MG: at 07:59

## 2017-10-27 RX ADMIN — NYSTATIN 500000 UNITS: 100000 SUSPENSION ORAL at 19:52

## 2017-10-27 RX ADMIN — NAFCILLIN: 10 INJECTION, POWDER, FOR SOLUTION INTRAVENOUS at 16:05

## 2017-10-27 RX ADMIN — INSULIN ASPART 1 UNITS: 100 INJECTION, SOLUTION INTRAVENOUS; SUBCUTANEOUS at 19:53

## 2017-10-27 RX ADMIN — MULTIVITAMIN 15 ML: LIQUID ORAL at 08:01

## 2017-10-27 RX ADMIN — DEXMEDETOMIDINE HYDROCHLORIDE 0.6 MCG/KG/HR: 4 INJECTION, SOLUTION INTRAVENOUS at 13:22

## 2017-10-27 RX ADMIN — NYSTATIN 500000 UNITS: 100000 SUSPENSION ORAL at 14:34

## 2017-10-27 RX ADMIN — NAFCILLIN: 10 INJECTION, POWDER, FOR SOLUTION INTRAVENOUS at 07:55

## 2017-10-27 RX ADMIN — Medication 1 PACKET: at 08:01

## 2017-10-27 RX ADMIN — Medication: at 08:08

## 2017-10-27 RX ADMIN — DEXMEDETOMIDINE HYDROCHLORIDE 0.6 MCG/KG/HR: 4 INJECTION, SOLUTION INTRAVENOUS at 07:12

## 2017-10-27 RX ADMIN — INSULIN ASPART 1 UNITS: 100 INJECTION, SOLUTION INTRAVENOUS; SUBCUTANEOUS at 04:32

## 2017-10-27 RX ADMIN — NYSTATIN 500000 UNITS: 100000 SUSPENSION ORAL at 02:30

## 2017-10-27 RX ADMIN — ACETAMINOPHEN 975 MG: 325 SOLUTION ORAL at 15:59

## 2017-10-27 RX ADMIN — NAFCILLIN: 10 INJECTION, POWDER, FOR SOLUTION INTRAVENOUS at 04:20

## 2017-10-27 RX ADMIN — INSULIN ASPART 1 UNITS: 100 INJECTION, SOLUTION INTRAVENOUS; SUBCUTANEOUS at 08:03

## 2017-10-27 RX ADMIN — ALBUMIN (HUMAN) 25 G: 12.5 SOLUTION INTRAVENOUS at 09:33

## 2017-10-27 RX ADMIN — PROPOFOL 30 MCG/KG/MIN: 10 INJECTION, EMULSION INTRAVENOUS at 15:07

## 2017-10-27 RX ADMIN — Medication: at 19:53

## 2017-10-27 RX ADMIN — MIDAZOLAM 4 MG: 1 INJECTION INTRAMUSCULAR; INTRAVENOUS at 14:15

## 2017-10-27 RX ADMIN — PROPOFOL 100 MG: 10 INJECTION, EMULSION INTRAVENOUS at 07:12

## 2017-10-27 RX ADMIN — LEVOFLOXACIN 750 MG: 5 INJECTION, SOLUTION INTRAVENOUS at 11:15

## 2017-10-27 RX ADMIN — NAFCILLIN: 10 INJECTION, POWDER, FOR SOLUTION INTRAVENOUS at 12:36

## 2017-10-27 RX ADMIN — ACETAMINOPHEN 975 MG: 325 SOLUTION ORAL at 07:57

## 2017-10-27 RX ADMIN — METOPROLOL TARTRATE 12.5 MG: 100 TABLET, FILM COATED ORAL at 07:59

## 2017-10-27 RX ADMIN — HYDRALAZINE HYDROCHLORIDE 10 MG: 20 INJECTION INTRAMUSCULAR; INTRAVENOUS at 05:09

## 2017-10-27 RX ADMIN — HYDRALAZINE HYDROCHLORIDE 20 MG: 20 INJECTION INTRAMUSCULAR; INTRAVENOUS at 05:49

## 2017-10-27 RX ADMIN — NYSTATIN 500000 UNITS: 100000 SUSPENSION ORAL at 08:05

## 2017-10-27 RX ADMIN — HEPARIN SODIUM 5000 UNITS: 5000 INJECTION, SOLUTION INTRAVENOUS; SUBCUTANEOUS at 04:21

## 2017-10-27 RX ADMIN — ROCURONIUM BROMIDE 100 MG: 10 INJECTION INTRAVENOUS at 07:12

## 2017-10-27 RX ADMIN — FAMOTIDINE 20 MG: 40 POWDER, FOR SUSPENSION ORAL at 07:59

## 2017-10-27 RX ADMIN — HEPARIN SODIUM 5000 UNITS: 5000 INJECTION, SOLUTION INTRAVENOUS; SUBCUTANEOUS at 11:35

## 2017-10-27 RX ADMIN — FENTANYL CITRATE 100 MCG: 50 INJECTION INTRAMUSCULAR; INTRAVENOUS at 14:34

## 2017-10-27 RX ADMIN — NAFCILLIN: 10 INJECTION, POWDER, FOR SOLUTION INTRAVENOUS at 20:00

## 2017-10-27 RX ADMIN — METOPROLOL TARTRATE 12.5 MG: 25 TABLET, FILM COATED ORAL at 19:53

## 2017-10-27 RX ADMIN — ASPIRIN 81 MG CHEWABLE TABLET 81 MG: 81 TABLET CHEWABLE at 07:59

## 2017-10-27 RX ADMIN — HYDRALAZINE HYDROCHLORIDE 10 MG: 20 INJECTION INTRAMUSCULAR; INTRAVENOUS at 22:08

## 2017-10-27 RX ADMIN — OXYCODONE HYDROCHLORIDE 5 MG: 5 TABLET ORAL at 19:52

## 2017-10-27 RX ADMIN — ALBUMIN (HUMAN) 500 ML: 12.5 SOLUTION INTRAVENOUS at 15:28

## 2017-10-27 ASSESSMENT — VISUAL ACUITY
OU: NOT TESTABLE;GLASSES
OU: NOT TESTABLE;GLASSES

## 2017-10-27 NOTE — PROGRESS NOTES
Pt self-extubated during a coughing fit. Pt re-intubated for airway protection - trach had been scheduled for this afternoon. A #8 ETT was placed and secured at 24 cm @ the lip. Positive color change noted with CO2 detector, breath sounds were present and equal bilaterally. Patient placed on full vent support, labs and CXR pending.

## 2017-10-27 NOTE — OR NURSING
Peg placement at bedside aborted primarily d/t hypotension.  Discussion regarding placement time, presently anticipating placement on Monday.  KYRA Villegas at bedside informed.

## 2017-10-27 NOTE — CONSULTS
General Surgery Consult Note    Name: Cricket Miguel MRN # [id number]   Age: 62 year old YOB: 1955     Date of Admission: October 27, 2017  Consulting service: CVICU            Reason for consult:   Malnutrition         History of Present Illness:   Cricket Miguel is a 62 year old male with significant history of endocarditis/aortic fluid collection s/p Bentall procedure, whose hospital course been complicated by respiratory failure and malnutrition, now requiring a gastrostomy tube.         Review of Systems:   The Review of Systems is negative other than noted in the HPI         Past Medical History:     Past Medical History:   Diagnosis Date     AI (aortic insufficiency)      Aortic root dilatation (H)      AR (aortic regurgitation)     severe     Cardiomyopathy (H)      CHF (congestive heart failure), NYHA class II (H)      COPD, mild (H)     spirometry 12/16     Heart murmur      Hyperlipidemia LDL goal <70 10/31/2010     Hypertension goal BP (blood pressure) < 140/90      Inguinal hernia      left lung nodule  1/29/2008     Major depressive disorder, single episode, moderate (H)      Psoriasis childhood     Tobacco use disorder              Past Surgical History:     Past Surgical History:   Procedure Laterality Date     ARTHROSCOPY KNEE INCISION AND DRAINAGE Left 10/8/2017    Procedure: ARTHROSCOPY KNEE INCISION AND DRAINAGE;  Arthroscopic Incision and Drainage of Left Knee;  Surgeon: Lucretia Munoz MD;  Location: [facility] OR     HC SHOULDER ARTHROSCOPY, DX  2001    Arthroscopy, Shoulder RT Dr OSIRIS Andersen     HC SHOULDER ARTHROSCOPY, DX  1/04    LT arthroscopy Dr OSIRIS Andersen     HERNIA REPAIR, UMBILICAL  1/08    incarcerated - dr rockwell     HERNIORRHAPHY INGUINAL  12/21/2012    HERNIORRHAPHY INGUINAL;  Right Inguinal Hernia Repair with mesh ;  Surgeon: Mello Rockwell MD;  Location: [facility] OR     REPLACE VALVE AORTIC N/A 5/17/2016    REPLACE VALVE AORTIC - Dr Bowers     ROTATOR CUFF REPAIR  RT/LT  11/10    Rt arthroscopy - Dr OSIRIS Andersen     SURGICAL HISTORY OF -   5/16    AVR, severe AR, aortic root repair             Social History:     Social History     Social History     Marital status:      Spouse name: N/A     Number of children: 3     Years of education: 12     Occupational History     Not on file.     Social History Main Topics     Smoking status: Former Smoker     Packs/day: 1.00     Years: 35.00     Quit date: 4/1/2016     Smokeless tobacco: Never Used      Comment: 4/2016     Alcohol use 0.0 oz/week     0 Standard drinks or equivalent per week      Comment: 1 drink per week avg, seldom     Drug use: Yes      Comment: marijuana- daily     Sexual activity: Yes     Partners: Female     Other Topics Concern     Caffeine Concern Yes     3 cups     Sleep Concern No     Special Diet No     trying to watch salt     Exercise Yes     walking     Seat Belt No     Parent/Sibling W/ Cabg, Mi Or Angioplasty Before 65f 55m? No     Social History Narrative            Family History:     Family History   Problem Relation Age of Onset     Genetic Disorder Mother      Bone plateover growth Agustin. shoulder surgeries     CANCER Mother      brain cancer     Alzheimer Disease Father             Allergies:      Allergies   Allergen Reactions     Lisinopril Swelling     One-sided facial swelling after being on lisinopril/HCTZ for one week.             Medications:     No current facility-administered medications on file prior to encounter.   Current Outpatient Prescriptions on File Prior to Encounter:  hydrochlorothiazide (HYDRODIURIL) 25 MG tablet Take 1 tablet (25 mg) by mouth daily   atorvastatin (LIPITOR) 40 MG tablet Take 1 tablet (40 mg) by mouth daily   amLODIPine (NORVASC) 5 MG tablet Take 1 tablet (5 mg) by mouth 2 times daily   furosemide (LASIX) 20 MG tablet Take 1 tablet (20 mg) by mouth daily as needed If blood pressure above 130   carvedilol (COREG) 12.5 MG tablet Take 1 tablet (12.5 mg) by mouth  "2 times daily (with meals)   aspirin  MG tablet Take 1 tablet (325 mg) by mouth daily            Vital Signs:   Blood pressure 91/63, pulse 82, temperature 98  F (36.7  C), temperature source Axillary, resp. rate 12, height 1.727 m (5' 8\"), weight 91.9 kg (202 lb 9.6 oz), SpO2 100 %.    Vitals were reviewed.         Physical Exam:   General: Intubated and sedated  Chest: Non labored breathing on the vent, clear on auscultation  CVS: RRR  Abdomen: soft, non tender, non distended  Extremities: Warm and well perfused, non tender, peripheral pulses palpable         Data:   CBC  Recent Labs  Lab 10/27/17  0430 10/26/17  0358 10/25/17  0346 10/24/17  1215 10/24/17  0405   WBC 6.8 7.5 9.7  --  7.8   HGB 8.7* 9.1* 9.5* 8.8* 6.9*    319 340  --  355     BMP  Recent Labs  Lab 10/27/17  0430 10/26/17  1513 10/26/17  0358 10/25/17  1550    142 145* 144   POTASSIUM 3.7 3.7 3.4 3.7   CHLORIDE 109 109 110* 110*   CO2 27 27 26 25   BUN 30 31* 31* 32*   CR 1.46* 1.44* 1.53* 1.51*   * 130* 135* 141*     CoagsNo lab results found in last 7 days.              Assessment and Plan:   Assessment:   62M w/ endocarditis/aortic fluid collection s/p Bentall procedure, whose hospital course been complicated by respiratory failure and malnutrition, now requiring a gastrostomy tube.      Plan:   - Plan for bedside PEG today  - Consent obtained  - Hold sq heparin  - Pre procedure versed/fentanyl       Assessment and plan discussed with staff, Dr Ginger Warner MD  Surgery, PGY-1      "

## 2017-10-27 NOTE — PLAN OF CARE
Problem: Patient Care Overview  Goal: Plan of Care/Patient Progress Review  Outcome: No Change  D/I :Pt reintubated at change of shift due to tube slippage.  Pt initially tachycardic to 130's and hypertensive to 140's this morning. Administered scheduled enteral metoprolol dose and MAPs down to 50's in 90 minutes. Notified MD, administered 500cc 5% albumin which resolved issue. Pt arousable on 0.6 Precedex gtt, unable to move left extremities most of shift, but moving in late afternoon. Trach placed by Dr Marcelino without incident. Started Propofol gtt for PEG tube placement at 1500H, followed by severe hypotension again. Resolved with Albumin dose but PEG placement will be held until Monday.  A: Adequate urine outputs with condom cath, replaced at change of shift and again at 1600H. P; Updated son and SO in person, daughter in [place] by telephone. P: Continue plan of care.

## 2017-10-27 NOTE — PROVIDER NOTIFICATION
S: Turned pt toward vent to change wet bed Pt began coughing forcefully. ETT forced out to 18cm, pt speaking. RT/anesthesia called stat. Re-intubated. Dropped SBP to 70 after propofol, given phenylephrine by MD. Hypertensive and tachycardic after. CVTS resident called by charge RN and here also. CXR pending.

## 2017-10-27 NOTE — PLAN OF CARE
Problem: Patient Care Overview  Goal: Plan of Care/Patient Progress Review  Outcome: No Change  S: Near midnight pt hypertensive, coughing incessantly, restless, nods yes to query if right shoulder sore (hx per S.O.). Given oxy, scheduled tylenol, and increased precedex. Coughing resolved. 0200 unarouseable except resisted opening eyelids for pupil check.  B: Sepsis/CVA.  A: No longer needs as much sedation.  R: Wan precedex. Recheck electrolytes. Hold TF 0800 for planned trach. Pain med as needed.

## 2017-10-27 NOTE — PROGRESS NOTES
CLINICAL NUTRITION SERVICES - REASSESSMENT NOTE     Nutrition Prescription    RECOMMENDATIONS FOR MDs/PROVIDERS TO ORDER:  Free water flush adjustment per MD discretion pending sodium and fluid status    Malnutrition Status:    Non-severe malnutrition in the context of chronic illness    Recommendations already ordered by Registered Dietitian (RD):  Change TF orders to reflect new enteral access    Future/Additional Recommendations:  If remains intubated, recommend obtain repeat metabolic cart study to assess approp of energy provisions to avoid over/under feeding.     EVALUATION OF THE PROGRESS TOWARD GOALS   Diet: NPO (intubated since 10/6)    Nutrition Support: TwoCal HN @ 50 mL/hr + 1 packet ProSource daily to provide total 2440 kcal (85% MREE or 33 kcal/kg) and 112 g PRO (1.5 g/kg)    Intake: 6 day TF infusion average = 1183 mL, 99% of goal rate to provide 2416 kcal (32 kcal/kg) and 111 g PRO (1.5 g/kg)       NEW FINDINGS   GI: Pt has continued to tolerate TF over the past week @ goal rate. To receive PEG today.    Resp: Pt self-extubated this morning and emergently re-intubated, trach to be placed today    Weight: NEW lowest admit weight of 88.7 kg on 10/19. No change to dosing weight.    MALNUTRITION  % Intake: No decreased intake noted  % Weight Loss: None noted  Subcutaneous Fat Loss: None observed  Muscle Loss: Temporal and Upper leg (quadricep, hamstring):  Mild  Fluid Accumulation/Edema: Mild  Malnutrition Diagnosis: Non-severe malnutrition in the context of chronic illness    Previous Goals   Total avg nutritional intake to meet a minimum of 80% MREE (31 kcal/kg) and 1.2 g PRO/kg daily (per dosing wt 75 kg).  Evaluation: Met    Previous Nutrition Diagnosis  Inadequate energy intake related to TF interruptions AEB average intake over past week met 29 kcal/kg (with needs of 31 kcal/kg).   Evaluation: Improving    CURRENT NUTRITION DIAGNOSIS  Predicted inadequate nutrient intake (protein/energy) related to  potential for interruption to EN as evidenced by dependence on EN to meet estimated energy and protein needs      INTERVENTIONS  Implementation  Modify TF order to reflect new enteral access  Collaboration with other providers    Goals  Total avg nutritional intake to meet a minimum of 80% MREE (31 kcal/kg) and 1.2 g PRO/kg daily (per dosing wt 75 kg).    Monitoring/Evaluation  Progress toward goals will be monitored and evaluated per protocol.    Stephanie Harper RDN, LD  Pgr: [phone]

## 2017-10-27 NOTE — PROGRESS NOTES
CV ICU Progress Note    POD: 19 Days Post-Op  LOS: 21    Admission History: Mr. Cricket Miguel is a 62 year old gentleman w/ h/o AVR and ascending aortic repair in 2016 admitted with acute mental status changes found to have large moises-aortic fluid collection and multiple strokes likely septic emboli.  MRI findings showed left posterior MCA, left PICA, and small right PCA infarcts.    S/Interval History:   Pt self-extubated early this AM.  Reintubated emergently.  HTN w/ pain overnight, given one dose Oxy.  PEG and trach planned for today.    O:    Temp: 97.7  F (36.5  C) Temp  Min: 97.6  F (36.4  C)  Max: 98.4  F (36.9  C)  Resp: 16 Resp  Min: 9  Max: 23  SpO2: 99 % SpO2  Min: 94 %  Max: 100 %    No Data Recorded  Heart Rate: 80 Heart Rate  Min: 63  Max: 140  BP: (!) 73/57 Systolic (24hrs), Av , Min:73 , Max:73   Diastolic (24hrs), Av, Min:57, Max:57    Ventilation Mode: CMV/AC  FiO2 (%): 45 %  Rate Set (breaths/minute): 12 breaths/min  Tidal Volume Set (mL): 500 mL  PEEP (cm H2O): 5 cmH2O  Pressure Support (cm H2O): 7 cmH2O  Oxygen Concentration (%): 35 %  Resp: 16  Date 10/16/17 0700 - 10/17/17 0659   Shift 8298-1833 6919-1069 2584-8236 24 Hour Total   I  N  T  A  K  E   I.V. 114.6   114.6    NG/   400    Enteral 240   240    Shift Total  (mL/kg) 754.6  (8.34)   754.6  (8.34)   O  U  T  P  U  T   Urine 575   575    Shift Total  (mL/kg) 575  (6.35)   575  (6.35)   Weight (kg) 90.5 90.5 90.5 90.5         Past Medical History:   Diagnosis Date     AI (aortic insufficiency)      Aortic root dilatation (H)      AR (aortic regurgitation)     severe     Cardiomyopathy (H)      CHF (congestive heart failure), NYHA class II (H)      COPD, mild (H)     spirometry      Heart murmur      Hyperlipidemia LDL goal <70 10/31/2010     Hypertension goal BP (blood pressure) < 140/90      Inguinal hernia      left lung nodule  2008     Major depressive disorder, single episode, moderate (H)      Psoriasis  childhood     Tobacco use disorder        Past Surgical History:   Procedure Laterality Date     ARTHROSCOPY KNEE INCISION AND DRAINAGE Left 10/8/2017    Procedure: ARTHROSCOPY KNEE INCISION AND DRAINAGE;  Arthroscopic Incision and Drainage of Left Knee;  Surgeon: Lucretia Munoz MD;  Location: [facility] OR     HC SHOULDER ARTHROSCOPY, DX  2001    Arthroscopy, Shoulder RT Dr OSIRIS Andersen     HC SHOULDER ARTHROSCOPY, DX  1/04    LT arthroscopy Dr OSIRIS Andersen     HERNIA REPAIR, UMBILICAL  1/08    incarcerated - dr rockwell     HERNIORRHAPHY INGUINAL  12/21/2012    HERNIORRHAPHY INGUINAL;  Right Inguinal Hernia Repair with mesh ;  Surgeon: Mello Rockwell MD;  Location: [facility] OR     REPLACE VALVE AORTIC N/A 5/17/2016    REPLACE VALVE AORTIC - Dr Bowers     ROTATOR CUFF REPAIR RT/LT  11/10    Rt arthroscopy - Dr OSIRIS Andersen     SURGICAL HISTORY OF -   5/16    AVR, severe AR, aortic root repair       Physical Exam    General: Intubated, lightly sedated, in no distress   Neck: Supple, no tracheal deviation  Cardiovascular: Regular tachy, RRR  Pumonary: Non labored breathing w/ PS.  CTAB   Abdomen: Soft, non distended, non tender.   Ext: Upper and lower extremity mild edema  Neuro: Small sluggishly reactive pupil bilaterally approximately 2mm - Left oculocephalic & LT Corneal abscent with mild intermittent nystagmus to the RT side.  Tracks.  Opens eyes to voice.  Nods appropriately to questions periodically.    Lab Results   Component Value Date    WBC 6.8 10/27/2017    HGB 8.7 (L) 10/27/2017    HCT 27.7 (L) 10/27/2017     10/27/2017     10/27/2017    POTASSIUM 3.7 10/27/2017    CHLORIDE 109 10/27/2017    CO2 27 10/27/2017    BUN 30 10/27/2017    CR 1.46 (H) 10/27/2017     (H) 10/27/2017     (H) 10/23/2017    DD 1.7 (H) 04/25/2016    NTBNPI 2768 (H) 04/25/2016    NTBNP 1302 (H) 06/03/2016    TROPONIN <0.07 12/05/2005    TROPI 0.634 (HH) 10/08/2017    AST 46 (H) 10/24/2017    ALT 42 10/24/2017     ALKPHOS 85 10/06/2017    BILITOTAL 1.5 (H) 10/06/2017    INR 1.03 10/08/2017           propofol (DIPRIVAN) infusion       dexmedetomidine 0.6 mcg/kg/hr (10/27/17 1056)     IV fluid REPLACEMENT ONLY           Assessment and Plan: Mr. Cricket Miguel is a 62 year old gentleman w/ h/o AVR and ascending aortic repair in 5/2016 admitted with acute mental status changes found to have large moises-aortic fluid collection and multiple strokes likely septic emboli.  Concern for moises-infarct edema leading to expansion of the cerebellar infarct.    Neuro:     Multifocal CVA: L cerebellar, L MCA, and R occipital regions.  Concern for septic emboli.  Small microhemorrhages were observed.  Progressive edema noted.  Neurosurgery and neurocritical care are following.  Angio showed no evidence of mycotic aneurysms.  Neuro exam stable.    Neurocrit team comments:  From his stroke, expected deficit include some language difficulty, right and left confusion, some trouble with calculation, possible visual field cut, and coordination with gait imbalance. Motor weakness from the stroke should include right hemiparesis/hemiplegia. 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 Neurological prognosis is good with very slow recovery, which will take months.    Physical exam showing mild improvement.  Patient opens eyes and periodically nods appropriately to questions.      ASA 81mg    Heparin 5000 U sq q8 hours.  Held for PEG and Trach.  Restart after.    Acetaminophen, Ibuprofen, Fentanyl prn    Precedex gtt for anxiety on ventilator    Resp:     Self extubated 10/27/17    Reintubated without significant desaturation    Intubated and mechanically ventilated.    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  1st degree AVB.      Tagged WBC scan showed likely infectious etiology.    Surgical date will be scheduled after patient has shown stabilization and recovery from neurologic insult.    BP lability    Hypertensive overnight and hypotensive this am    Given 500 mL Albumin this am for hypotension    NE ordered as backup if hypotension persists    Consider diuresis this evening due to net positive fluid status    Tachycardia    Metoprolol started 10/19  GI/FEN:     Post pyloric feeding tube in place, keep infusing at goal rate.    PEG this PM  Renal:     Monitor electrolytes, Cr, and urine output     Lytes goals K>4 and Mag>2    JOSE:     Monitor Urine Output. Cr      Volume up today.  Possible diuresis this afternoon after PEG/Trach.    Hypernatremia, resolved    FWF decreased to 30 q4    Goal Na+ 145-155 per NCC  Endo:    Glucose checks, SSI to serum glucose >80, <180 mg/dL  ID:     Sepsis: MSSA bacteremia with likely source of the L knee septic arthritis. Knee is s/p arthroscopic irrigation and debridement 10/8. Renal function has recovered.  Otherwise hemodynamically stable without pressors.      ID consultant recommended:    Continue Nafcillin & Rifampin a minimum of 3 months and even longer if pt hasn't had surgery by that point (i.e. Continue through surgery)    Continue Levofloxacin for 4 weeks from start of therapy (10/07-11/04)    Stop Gentamicin    F/U in ID clinic if discharged to LTAC     Nystatin (oral candidiasis)     PET scan 10/19 revealed likely infectious fluid around ascending aorta    Tooth Remnant, left lower canine    OMF o/b, rec continuing abx and outpatient f/u    Monitor WBC daily   Heme:     Hgb:  stable  Prophylaxis: SCDs, Famotidine  Lines:      CVC 20 days    Espinoza 20 days    A-line 20 days    Rectal Tube 8 days      Dispo: Continue ICU care. Trach & PEG this PM    Code Status: Full Code    Jeramie Orellana DO  Anesthesiology Resident  CVICU Service, *[phone]    Patient seen and staffed with attending.

## 2017-10-27 NOTE — PLAN OF CARE
Problem: Patient Care Overview  Goal: Plan of Care/Patient Progress Review  D: Pt admitted for AMS on 10/6, found to have large moises-aortic fluid collection with multiple strokes, likely from septic emboli. Source of infection found to be L knee arthritis, irrigated/debrided on 10/8 and started on aggressive abx regimen. Has since been neurologically labile, but no further emboli suspected.       Neuro: On Precedex 0.2-0.6mcg/kg/min throughout day. Continues to thrash head side to side intermittently. Continues to move LUE/LLE very slightly, but not to command. RUE/RLE withdraws at times, but will have no reaction to stimuli at other times. PERRL 2-4, brisk. Pupils intermittently uneven, R pupil > L. IV fentanyl switched to po oxy, 5 mg given once, effective. Pt up to chair via sling x 2 for total of 5 hours today, tolerated very well.  CV: SR/ST. SBP goal < 140. Hydralizine 10mg given once, effective. BP very labile, occasionally will drop down to 80s/40s for prolonged periods.  Resp: Intubated. On PS 7/5 35% FiO2. Lungs coarse to clear.  GI: Rectal tube in place, stools liquid, brown. 500cc stool out throughout day. TF in [place] at 50cc/hr, 75q1h flushes for high sodiums. Last Na 142.  : Condom cath in place. UO 75-100cc/hr.   Skin: L knee debridement site, sutured WDL. Otherwise skin WDL.      P: Continue current POC. Monitor neuro and hemodynamic status closely. Trach planned for tomorrow at 2:00 PM.

## 2017-10-27 NOTE — PROGRESS NOTES
Paged to the patient's room by RN who stated patient had coughed and the balloon from his ETT was now above his vocal cords.  I instructed her to page Anesthesia STAT for re-intubation.  Upon presenting to the room, RT was masking the patient and he was sating >98%.  Patient was also breathing on his own and shaking his head.  I personally re-intubated the patient with a new ETT and assistance from the Anesthesia CRNAs.  Patient tolerated the intubation well and his Precedex infusion was increased for the period of time while he will be paralyzed.       Jeramie Orellana DO  Anesthesiology Resident  CVICU Service, *[phone]     Patient seen and staffed with attending.

## 2017-10-27 NOTE — CONSULTS
SURGERY CONSULTATION  10/27/2017    Date of Admission:  10/6/2017  Reason for Consultation: We were asked by Dr. Bullock of CVICU to evaluate Cricket Miguel for percutaneous tracheostomy. The patient was seen and evaluated.    Attending: Dr. Marcelino    HPI: Cricket Miguel is a 62 year old male with significant history of aortic insufficiency and aortic root dilation s/p AVR and ascending aortic repair 5/2016 who was admitted with endocarditis and found to have a periaortic fluid collection. His course has been complicated by multiple strokes 2/2 septic emboli. He is being managed by the CVICU and Neurocrit teams. Given his mental status, he has been unable to extubate. For this reason, we were asked to evaluate for possible percutaneous tracheostomy for long-term definitive airway management.     He did have a CT Neck on 10/21/17 for left jaw/neck swelling. There was no drainable collections but concern for cellulitis. He is not able to participate in this history taking due to intubation and altered mental status. Of note, pt coughed his ETT out and required emergent re-intubation by anesthesia.    ROS:   Unable to obtain due to AMS.    Past Medical History:  Past Medical History:   Diagnosis Date     AI (aortic insufficiency)      Aortic root dilatation (H)      AR (aortic regurgitation)     severe     Cardiomyopathy (H)      CHF (congestive heart failure), NYHA class II (H)      COPD, mild (H)     spirometry 12/16     Heart murmur      Hyperlipidemia LDL goal <70 10/31/2010     Hypertension goal BP (blood pressure) < 140/90      Inguinal hernia      left lung nodule  1/29/2008     Major depressive disorder, single episode, moderate (H)      Psoriasis childhood     Tobacco use disorder        Past Surgical History:   Past Surgical History:   Procedure Laterality Date     ARTHROSCOPY KNEE INCISION AND DRAINAGE Left 10/8/2017    Procedure: ARTHROSCOPY KNEE INCISION AND DRAINAGE;  Arthroscopic Incision and  Drainage of Left Knee;  Surgeon: Lucretia Munoz MD;  Location: [facility] OR     HC SHOULDER ARTHROSCOPY, DX  2001    Arthroscopy, Shoulder RT Dr OSIRIS Andersen     HC SHOULDER ARTHROSCOPY, DX  1/04    LT arthroscopy Dr OSIRIS Andersen     HERNIA REPAIR, UMBILICAL  1/08    incarcerated - dr rockwell     HERNIORRHAPHY INGUINAL  12/21/2012    HERNIORRHAPHY INGUINAL;  Right Inguinal Hernia Repair with mesh ;  Surgeon: Mello Rockwell MD;  Location: [facility] OR     REPLACE VALVE AORTIC N/A 5/17/2016    REPLACE VALVE AORTIC - Dr Bowers     ROTATOR CUFF REPAIR RT/LT  11/10    Rt arthroscopy - Dr OSIRIS Andersen     SURGICAL HISTORY OF -   5/16    AVR, severe AR, aortic root repair       Medications:     No current facility-administered medications on file prior to encounter.   Current Outpatient Prescriptions on File Prior to Encounter:  hydrochlorothiazide (HYDRODIURIL) 25 MG tablet Take 1 tablet (25 mg) by mouth daily   atorvastatin (LIPITOR) 40 MG tablet Take 1 tablet (40 mg) by mouth daily   amLODIPine (NORVASC) 5 MG tablet Take 1 tablet (5 mg) by mouth 2 times daily   furosemide (LASIX) 20 MG tablet Take 1 tablet (20 mg) by mouth daily as needed If blood pressure above 130   carvedilol (COREG) 12.5 MG tablet Take 1 tablet (12.5 mg) by mouth 2 times daily (with meals)   aspirin  MG tablet Take 1 tablet (325 mg) by mouth daily       Allergies:   Allergies   Allergen Reactions     Lisinopril Swelling     One-sided facial swelling after being on lisinopril/HCTZ for one week.        Social History:   Social History     Social History     Marital status:      Spouse name: N/A     Number of children: 3     Years of education: 12     Occupational History     Not on file.     Social History Main Topics     Smoking status: Former Smoker     Packs/day: 1.00     Years: 35.00     Quit date: 4/1/2016     Smokeless tobacco: Never Used      Comment: 4/2016     Alcohol use 0.0 oz/week     0 Standard drinks or equivalent per week       "Comment: 1 drink per week avg, seldom     Drug use: Yes      Comment: marijuana- daily     Sexual activity: Yes     Partners: Female     Other Topics Concern     Caffeine Concern Yes     3 cups     Sleep Concern No     Special Diet No     trying to watch salt     Exercise Yes     walking     Seat Belt No     Parent/Sibling W/ Cabg, Mi Or Angioplasty Before 65f 55m? No     Social History Narrative       Family History:   Family History   Problem Relation Age of Onset     Genetic Disorder Mother      Bone plateover growth Agustin. shoulder surgeries     CANCER Mother      brain cancer     Alzheimer Disease Father        Exam:  BP (!) 73/57  Pulse 82  Temp 97.7  F (36.5  C) (Axillary)  Resp 16  Ht 1.727 m (5' 8\")  Wt 91.9 kg (202 lb 9.6 oz)  SpO2 99%  BMI 30.81 kg/m2  General: intubated, responds to touch, doesn't open eyes  Neck: supple, no erythema or edema  CV: RRR  Pulm: Mechanically vented.   Abd: soft     Labs: Reviewed in full.  142 109 30 / 130-164  3.7 27 1.46 \    Imaging: Reviewed.   Recent Results (from the past 24 hour(s))   XR Chest Port 1 View    Narrative    XR CHEST PORT 1 VW 10/27/2017 12:50 AM    History: monitor ett    Comparison: Chest x-ray on 10/26/2017    Findings: Single view of the chest was obtained. Postsurgical changes  of cardiac surgery with median sternotomy wires and surgical clips.  Endotracheal tube tip projects over mid thoracic trachea. Enteric tube  crosses the distal tip outside the field of view. Persistent  enlargement of the cardiac silhouette. Right upper extremity PICC tip  projects over high right atrium.      Impression    Impression:   1. Endotracheal tube tip projects over mid thoracic trachea.  2. Stable enlargement the cardiac silhouette.  3. Stable post surgical changes and supporting devices.    I have personally reviewed the examination and initial interpretation  and I agree with the findings.    BONNIE ROWLAND MD      Assessment: Cricket Miguel is a 62 year " old male with significant history of AVR and ascending aortic repair 5/2016 and admitted 10/6/17  with endocarditis/aortic fluid collection s/p Bentall procedure complicated by multiple strokes likely from septic emboli being managed by CVICU and NeuroCrit. Patient is ventilator dependent due to mental status thus requiring long-term definitive airway.    Recommendations:  - percutaneous tracheostomy at beside in SICU  - pre-procedure sedation/PRNs  - informed consent signed over the phone with his daughter, Eloy Orellana, and a witness. Risks and benefits discussed. All questions answered.     The patient was discussed with Dr. Marcelino, surgery staff, who agrees with the plan.    Awais Sellers MD  General Surgery PGY-3  Pager [phone]

## 2017-10-27 NOTE — PROCEDURES
SURGEON: Nanette Marcelino      SEDATION/PARALYZATION:  Versed 4mg, fentanyl 100mg  Total anesthesia time 20 minutes       ESTIMATED BLOOD LOSS: Minimal       COMPLICATIONS: None.       TRACHEOSTOMY: 6-0 Shiley      INDICATIONS FOR PROCEDURE:  The patient will likely will require future prolonged ventilatory support. Need for formal tracheostomy is indicated for this reason. The risks, benefits and alternatives were described to the patient s family (daughter, Eloy Orellana), and they were willing to proceed.       DESCRIPTION OF PROCEDURE: This procedure was performed at the bedside in the surgical intensive care unit. A surgical time-out was undertaken to verify the patient s procedure and site. The patient was adequately sedated with intermittent versed and fentanyl.      The anterior neck was prepped with chloroprep solution and draped in a sterile fashion. Attention was directed at the midline trachea, where a longitudinal skin incision was made in the midline, just inferior to the cricothyroid membrane. The cricothyroid membrane was palpated and the tracheal rings were noted. Flexible bronchosope was introduced via the endotracheal tube and tube was retracted proximally past cricothyroid membrane. The space between the cricoid and 1st tracheal ring was identified. This was then accessed with a needle and sheath and a Seldinger technique was employed, under visualization with fiberoptic bronchoscopy. Once endotracheal position of the sheath and guidewire were confirmed, the tracheostomy site was serially dilated with Blue Rhino dilator. Next, a 6-0 cuffed Shiley tracheostomy tube was inserted over a tracheal dilator, over the guidewire, into the trachea. The Shiley cuff was inflated and the dilator and guidewire were removed. The inner cannula was introduced and the ventilator was connected to the newly formed tracheostomy. Intra tracheal placement confirmed with bronchoscopy and end tidal CO2. The  previous orotracheal tube was removed, and the patient was ventilated adequately through the percutaneous tracheostomy.      The tracheostomy itself was secured with 2-0 prolene x4 at the skin. No air leak was noted. Tidal volumes were appropriate for the ventilator settings. The patient tolerated the procedure well. All instrument and needle counts were correct at the end of the case.      Dr. Marcelino was scrubbed for the entire procedure.     Awais Sellers MD  General Surgery PGY-3  Pager [phone]

## 2017-10-27 NOTE — PLAN OF CARE
Problem: Patient Care Overview  Goal: Plan of Care/Patient Progress Review  PT 4A: Cx, pt not medically appropriate for PT, per RN tracheostomy recently placed, pt soon getting PEG.

## 2017-10-28 ENCOUNTER — APPOINTMENT (OUTPATIENT)
Dept: GENERAL RADIOLOGY | Facility: CLINIC | Age: 62
DRG: 004 | End: 2017-10-28
Attending: PSYCHIATRY & NEUROLOGY
Payer: COMMERCIAL

## 2017-10-28 LAB
ANION GAP SERPL CALCULATED.3IONS-SCNC: 7 MMOL/L (ref 3–14)
BUN SERPL-MCNC: 28 MG/DL (ref 7–30)
CALCIUM SERPL-MCNC: 8.2 MG/DL (ref 8.5–10.1)
CHLORIDE SERPL-SCNC: 110 MMOL/L (ref 94–109)
CO2 SERPL-SCNC: 25 MMOL/L (ref 20–32)
CREAT SERPL-MCNC: 1.44 MG/DL (ref 0.66–1.25)
ERYTHROCYTE [DISTWIDTH] IN BLOOD BY AUTOMATED COUNT: 20.1 % (ref 10–15)
GFR SERPL CREATININE-BSD FRML MDRD: 49 ML/MIN/1.7M2
GLUCOSE BLDC GLUCOMTR-MCNC: 135 MG/DL (ref 70–99)
GLUCOSE BLDC GLUCOMTR-MCNC: 150 MG/DL (ref 70–99)
GLUCOSE BLDC GLUCOMTR-MCNC: 152 MG/DL (ref 70–99)
GLUCOSE BLDC GLUCOMTR-MCNC: 156 MG/DL (ref 70–99)
GLUCOSE BLDC GLUCOMTR-MCNC: 158 MG/DL (ref 70–99)
GLUCOSE BLDC GLUCOMTR-MCNC: 167 MG/DL (ref 70–99)
GLUCOSE SERPL-MCNC: 165 MG/DL (ref 70–99)
HCT VFR BLD AUTO: 28.6 % (ref 40–53)
HGB BLD-MCNC: 9 G/DL (ref 13.3–17.7)
MAGNESIUM SERPL-MCNC: 2.2 MG/DL (ref 1.6–2.3)
MCH RBC QN AUTO: 30.7 PG (ref 26.5–33)
MCHC RBC AUTO-ENTMCNC: 31.5 G/DL (ref 31.5–36.5)
MCV RBC AUTO: 98 FL (ref 78–100)
PHOSPHATE SERPL-MCNC: 3.3 MG/DL (ref 2.5–4.5)
PLATELET # BLD AUTO: 295 10E9/L (ref 150–450)
POTASSIUM SERPL-SCNC: 3.5 MMOL/L (ref 3.4–5.3)
RBC # BLD AUTO: 2.93 10E12/L (ref 4.4–5.9)
SODIUM SERPL-SCNC: 143 MMOL/L (ref 133–144)
WBC # BLD AUTO: 8.7 10E9/L (ref 4–11)

## 2017-10-28 PROCEDURE — 25000132 ZZH RX MED GY IP 250 OP 250 PS 637: Performed by: SURGERY

## 2017-10-28 PROCEDURE — P9041 ALBUMIN (HUMAN),5%, 50ML: HCPCS | Performed by: STUDENT IN AN ORGANIZED HEALTH CARE EDUCATION/TRAINING PROGRAM

## 2017-10-28 PROCEDURE — 25000128 H RX IP 250 OP 636

## 2017-10-28 PROCEDURE — 85027 COMPLETE CBC AUTOMATED: CPT | Performed by: ORTHOPAEDIC SURGERY

## 2017-10-28 PROCEDURE — 25000132 ZZH RX MED GY IP 250 OP 250 PS 637: Performed by: THORACIC SURGERY (CARDIOTHORACIC VASCULAR SURGERY)

## 2017-10-28 PROCEDURE — 25000125 ZZHC RX 250: Performed by: STUDENT IN AN ORGANIZED HEALTH CARE EDUCATION/TRAINING PROGRAM

## 2017-10-28 PROCEDURE — 25000128 H RX IP 250 OP 636: Performed by: STUDENT IN AN ORGANIZED HEALTH CARE EDUCATION/TRAINING PROGRAM

## 2017-10-28 PROCEDURE — 83735 ASSAY OF MAGNESIUM: CPT | Performed by: ORTHOPAEDIC SURGERY

## 2017-10-28 PROCEDURE — 40000275 ZZH STATISTIC RCP TIME EA 10 MIN

## 2017-10-28 PROCEDURE — 25000132 ZZH RX MED GY IP 250 OP 250 PS 637: Performed by: ANESTHESIOLOGY

## 2017-10-28 PROCEDURE — 80048 BASIC METABOLIC PNL TOTAL CA: CPT | Performed by: ORTHOPAEDIC SURGERY

## 2017-10-28 PROCEDURE — 00000146 ZZHCL STATISTIC GLUCOSE BY METER IP

## 2017-10-28 PROCEDURE — 99291 CRITICAL CARE FIRST HOUR: CPT | Mod: GC | Performed by: ANESTHESIOLOGY

## 2017-10-28 PROCEDURE — 25000132 ZZH RX MED GY IP 250 OP 250 PS 637: Performed by: STUDENT IN AN ORGANIZED HEALTH CARE EDUCATION/TRAINING PROGRAM

## 2017-10-28 PROCEDURE — 27210429 ZZH NUTRITION PRODUCT INTERMEDIATE LITER

## 2017-10-28 PROCEDURE — 25000128 H RX IP 250 OP 636: Performed by: ANESTHESIOLOGY

## 2017-10-28 PROCEDURE — 25000128 H RX IP 250 OP 636: Performed by: THORACIC SURGERY (CARDIOTHORACIC VASCULAR SURGERY)

## 2017-10-28 PROCEDURE — 20000004 ZZH R&B ICU UMMC

## 2017-10-28 PROCEDURE — 94003 VENT MGMT INPAT SUBQ DAY: CPT

## 2017-10-28 PROCEDURE — 25000125 ZZHC RX 250: Performed by: THORACIC SURGERY (CARDIOTHORACIC VASCULAR SURGERY)

## 2017-10-28 PROCEDURE — 90686 IIV4 VACC NO PRSV 0.5 ML IM: CPT | Performed by: THORACIC SURGERY (CARDIOTHORACIC VASCULAR SURGERY)

## 2017-10-28 PROCEDURE — 27210913 ZZH NUTRITION PRODUCT INTERMEDIATE PACKET

## 2017-10-28 PROCEDURE — S0032 INJECTION, NAFCILLIN SODIUM: HCPCS | Performed by: THORACIC SURGERY (CARDIOTHORACIC VASCULAR SURGERY)

## 2017-10-28 PROCEDURE — 25000128 H RX IP 250 OP 636: Performed by: NEUROLOGICAL SURGERY

## 2017-10-28 PROCEDURE — 84100 ASSAY OF PHOSPHORUS: CPT | Performed by: ORTHOPAEDIC SURGERY

## 2017-10-28 PROCEDURE — 71010 XR CHEST PORT 1 VW: CPT

## 2017-10-28 PROCEDURE — P9041 ALBUMIN (HUMAN),5%, 50ML: HCPCS

## 2017-10-28 PROCEDURE — 40000014 ZZH STATISTIC ARTERIAL MONITORING DAILY

## 2017-10-28 RX ORDER — ALBUMIN, HUMAN INJ 5% 5 %
SOLUTION INTRAVENOUS
Status: COMPLETED
Start: 2017-10-28 | End: 2017-10-28

## 2017-10-28 RX ORDER — LABETALOL HYDROCHLORIDE 5 MG/ML
20 INJECTION, SOLUTION INTRAVENOUS EVERY 6 HOURS PRN
Status: DISCONTINUED | OUTPATIENT
Start: 2017-10-28 | End: 2017-10-29

## 2017-10-28 RX ORDER — ALBUMIN, HUMAN INJ 5% 5 %
12.5 SOLUTION INTRAVENOUS ONCE
Status: DISCONTINUED | OUTPATIENT
Start: 2017-10-28 | End: 2017-10-29

## 2017-10-28 RX ORDER — ALBUMIN, HUMAN INJ 5% 5 %
12.5 SOLUTION INTRAVENOUS ONCE
Status: COMPLETED | OUTPATIENT
Start: 2017-10-28 | End: 2017-10-28

## 2017-10-28 RX ADMIN — DEXMEDETOMIDINE HYDROCHLORIDE 0.4 MCG/KG/HR: 4 INJECTION, SOLUTION INTRAVENOUS at 18:00

## 2017-10-28 RX ADMIN — NAFCILLIN: 10 INJECTION, POWDER, FOR SOLUTION INTRAVENOUS at 04:18

## 2017-10-28 RX ADMIN — NAFCILLIN: 10 INJECTION, POWDER, FOR SOLUTION INTRAVENOUS at 00:37

## 2017-10-28 RX ADMIN — HEPARIN SODIUM 5000 UNITS: 5000 INJECTION, SOLUTION INTRAVENOUS; SUBCUTANEOUS at 11:46

## 2017-10-28 RX ADMIN — OXYCODONE HYDROCHLORIDE 5 MG: 5 TABLET ORAL at 05:16

## 2017-10-28 RX ADMIN — INSULIN ASPART 1 UNITS: 100 INJECTION, SOLUTION INTRAVENOUS; SUBCUTANEOUS at 00:45

## 2017-10-28 RX ADMIN — ALBUMIN (HUMAN) 12.5 G: 12.5 SOLUTION INTRAVENOUS at 11:12

## 2017-10-28 RX ADMIN — INSULIN ASPART 1 UNITS: 100 INJECTION, SOLUTION INTRAVENOUS; SUBCUTANEOUS at 11:54

## 2017-10-28 RX ADMIN — DEXMEDETOMIDINE HYDROCHLORIDE 0.4 MCG/KG/HR: 4 INJECTION, SOLUTION INTRAVENOUS at 12:16

## 2017-10-28 RX ADMIN — Medication: at 08:43

## 2017-10-28 RX ADMIN — NYSTATIN 500000 UNITS: 100000 SUSPENSION ORAL at 08:45

## 2017-10-28 RX ADMIN — OXYCODONE HYDROCHLORIDE 5 MG: 5 TABLET ORAL at 13:59

## 2017-10-28 RX ADMIN — NAFCILLIN: 10 INJECTION, POWDER, FOR SOLUTION INTRAVENOUS at 12:52

## 2017-10-28 RX ADMIN — HEPARIN SODIUM 5000 UNITS: 5000 INJECTION, SOLUTION INTRAVENOUS; SUBCUTANEOUS at 20:08

## 2017-10-28 RX ADMIN — Medication 20 MG: at 23:25

## 2017-10-28 RX ADMIN — MULTIVITAMIN 15 ML: LIQUID ORAL at 08:45

## 2017-10-28 RX ADMIN — Medication 300 MG: at 20:08

## 2017-10-28 RX ADMIN — ACETAMINOPHEN 975 MG: 325 SOLUTION ORAL at 15:23

## 2017-10-28 RX ADMIN — METOPROLOL TARTRATE 12.5 MG: 25 TABLET, FILM COATED ORAL at 08:45

## 2017-10-28 RX ADMIN — ACETAMINOPHEN 975 MG: 325 SOLUTION ORAL at 08:42

## 2017-10-28 RX ADMIN — Medication 1 PACKET: at 08:44

## 2017-10-28 RX ADMIN — DEXMEDETOMIDINE HYDROCHLORIDE 0.4 MCG/KG/HR: 4 INJECTION, SOLUTION INTRAVENOUS at 03:32

## 2017-10-28 RX ADMIN — Medication: at 20:08

## 2017-10-28 RX ADMIN — ACETAMINOPHEN 975 MG: 325 SOLUTION ORAL at 00:37

## 2017-10-28 RX ADMIN — NYSTATIN 500000 UNITS: 100000 SUSPENSION ORAL at 13:59

## 2017-10-28 RX ADMIN — ALBUMIN (HUMAN): 12.5 SOLUTION INTRAVENOUS at 11:12

## 2017-10-28 RX ADMIN — ASPIRIN 81 MG CHEWABLE TABLET 81 MG: 81 TABLET CHEWABLE at 08:42

## 2017-10-28 RX ADMIN — DEXMEDETOMIDINE HYDROCHLORIDE 0.4 MCG/KG/HR: 4 INJECTION, SOLUTION INTRAVENOUS at 20:13

## 2017-10-28 RX ADMIN — HEPARIN SODIUM 5000 UNITS: 5000 INJECTION, SOLUTION INTRAVENOUS; SUBCUTANEOUS at 04:19

## 2017-10-28 RX ADMIN — NAFCILLIN: 10 INJECTION, POWDER, FOR SOLUTION INTRAVENOUS at 15:24

## 2017-10-28 RX ADMIN — FAMOTIDINE 20 MG: 40 POWDER, FOR SUSPENSION ORAL at 08:43

## 2017-10-28 RX ADMIN — OXYCODONE HYDROCHLORIDE 5 MG: 5 TABLET ORAL at 23:49

## 2017-10-28 RX ADMIN — HYDRALAZINE HYDROCHLORIDE 20 MG: 20 INJECTION INTRAMUSCULAR; INTRAVENOUS at 23:49

## 2017-10-28 RX ADMIN — NYSTATIN 500000 UNITS: 100000 SUSPENSION ORAL at 02:32

## 2017-10-28 RX ADMIN — INFLUENZA A VIRUS A/MICHIGAN/45/2015 X-275 (H1N1) ANTIGEN (FORMALDEHYDE INACTIVATED), INFLUENZA A VIRUS A/HONG KONG/4801/2014 X-263B (H3N2) ANTIGEN (FORMALDEHYDE INACTIVATED), INFLUENZA B VIRUS B/PHUKET/3073/2013 ANTIGEN (FORMALDEHYDE INACTIVATED), AND INFLUENZA B VIRUS B/BRISBANE/60/2008 ANTIGEN (FORMALDEHYDE INACTIVATED) 0.5 ML: 15; 15; 15; 15 INJECTION, SUSPENSION INTRAMUSCULAR at 11:14

## 2017-10-28 RX ADMIN — NYSTATIN 500000 UNITS: 100000 SUSPENSION ORAL at 20:08

## 2017-10-28 RX ADMIN — INSULIN ASPART 1 UNITS: 100 INJECTION, SOLUTION INTRAVENOUS; SUBCUTANEOUS at 04:17

## 2017-10-28 RX ADMIN — Medication 300 MG: at 08:43

## 2017-10-28 RX ADMIN — METOPROLOL TARTRATE 12.5 MG: 25 TABLET, FILM COATED ORAL at 20:08

## 2017-10-28 RX ADMIN — NAFCILLIN: 10 INJECTION, POWDER, FOR SOLUTION INTRAVENOUS at 20:08

## 2017-10-28 RX ADMIN — NAFCILLIN: 10 INJECTION, POWDER, FOR SOLUTION INTRAVENOUS at 09:11

## 2017-10-28 RX ADMIN — INSULIN ASPART 1 UNITS: 100 INJECTION, SOLUTION INTRAVENOUS; SUBCUTANEOUS at 15:56

## 2017-10-28 RX ADMIN — INSULIN ASPART 1 UNITS: 100 INJECTION, SOLUTION INTRAVENOUS; SUBCUTANEOUS at 08:40

## 2017-10-28 RX ADMIN — POTASSIUM CHLORIDE 20 MEQ: 1.5 POWDER, FOR SOLUTION ORAL at 05:16

## 2017-10-28 RX ADMIN — LEVOFLOXACIN 750 MG: 5 INJECTION, SOLUTION INTRAVENOUS at 11:12

## 2017-10-28 ASSESSMENT — VISUAL ACUITY
OU: NOT TESTABLE;GLASSES

## 2017-10-28 NOTE — PROGRESS NOTES
CV ICU Progress Note    POD: 1 Day Post-Op  LOS: 22    Admission History: Mr. Cricket Miguel is a 62 year old gentleman w/ h/o AVR and ascending aortic repair in 2016 admitted with acute mental status changes found to have large moises-aortic fluid collection and multiple strokes likely septic emboli.  MRI findings showed left posterior MCA, left PICA, and small right PCA infarcts.    S/Interval History:   Trach done yesterday, PS this AM with no issues    O:    Temp: 98.6  F (37  C) Temp  Min: 98.3  F (36.8  C)  Max: 98.9  F (37.2  C)  Resp: 13 Resp  Min: 11  Max: 49  SpO2: 99 % SpO2  Min: 95 %  Max: 100 %    No Data Recorded  Heart Rate: 77 Heart Rate  Min: 56  Max: 125  BP: (!) 82/54 Systolic (24hrs), Av , Min:71 , Max:127   Diastolic (24hrs), Av, Min:49, Max:95    Ventilation Mode: CPAP/PS  FiO2 (%): 40 %  Rate Set (breaths/minute): 12 breaths/min  Tidal Volume Set (mL): 500 mL  PEEP (cm H2O): 7 cmH2O  Pressure Support (cm H2O): 7 cmH2O  Oxygen Concentration (%): 40 %  Resp: 13  Date 10/16/17 07 - 10/17/17 0659   Shift 6011-9923 2813-3316 0836-4497 24 Hour Total   I  N  T  A  K  E   I.V. 114.6   114.6    NG/   400    Enteral 240   240    Shift Total  (mL/kg) 754.6  (8.34)   754.6  (8.34)   O  U  T  P  U  T   Urine 575   575    Shift Total  (mL/kg) 575  (6.35)   575  (6.35)   Weight (kg) 90.5 90.5 90.5 90.5         Past Medical History:   Diagnosis Date     AI (aortic insufficiency)      Aortic root dilatation (H)      AR (aortic regurgitation)     severe     Cardiomyopathy (H)      CHF (congestive heart failure), NYHA class II (H)      COPD, mild (H)     spirometry      Heart murmur      Hyperlipidemia LDL goal <70 10/31/2010     Hypertension goal BP (blood pressure) < 140/90      Inguinal hernia      left lung nodule  2008     Major depressive disorder, single episode, moderate (H)      Psoriasis childhood     Tobacco use disorder        Past Surgical History:   Procedure Laterality  Date     ARTHROSCOPY KNEE INCISION AND DRAINAGE Left 10/8/2017    Procedure: ARTHROSCOPY KNEE INCISION AND DRAINAGE;  Arthroscopic Incision and Drainage of Left Knee;  Surgeon: Lucretia Munoz MD;  Location: [facility] OR     HC SHOULDER ARTHROSCOPY, DX  2001    Arthroscopy, Shoulder RT Dr OSIRIS Andersen     HC SHOULDER ARTHROSCOPY, DX  1/04    LT arthroscopy Dr OSIRIS Andersen     HERNIA REPAIR, UMBILICAL  1/08    incarcerated - dr rockwell     HERNIORRHAPHY INGUINAL  12/21/2012    HERNIORRHAPHY INGUINAL;  Right Inguinal Hernia Repair with mesh ;  Surgeon: Mello Rockwell MD;  Location: [facility] OR     REPLACE VALVE AORTIC N/A 5/17/2016    REPLACE VALVE AORTIC - Dr Bowers     ROTATOR CUFF REPAIR RT/LT  11/10    Rt arthroscopy - Dr OSIRIS Andersen     SURGICAL HISTORY OF -   5/16    AVR, severe AR, aortic root repair     TRACHEOSTOMY PERCUTANEOUS  10/27/2017            Physical Exam    General: Intubated, lightly sedated, in no distress   Neck: Supple, no tracheal deviation  Cardiovascular: Regular tachy, RRR  Pumonary: Non labored breathing w/ PS.  CTAB   Abdomen: Soft, non distended, non tender.   Ext: Upper and lower extremity mild edema  Neuro: Small sluggishly reactive pupil bilaterally approximately 2mm - Left oculocephalic & LT Corneal abscent with mild intermittent nystagmus to the RT side.  Tracks.  Opens eyes to voice.  Nods appropriately to questions periodically.    Lab Results   Component Value Date    WBC 8.7 10/28/2017    HGB 9.0 (L) 10/28/2017    HCT 28.6 (L) 10/28/2017     10/28/2017     10/28/2017    POTASSIUM 3.5 10/28/2017    CHLORIDE 110 (H) 10/28/2017    CO2 25 10/28/2017    BUN 28 10/28/2017    CR 1.44 (H) 10/28/2017     (H) 10/28/2017     (H) 10/23/2017    DD 1.7 (H) 04/25/2016    NTBNPI 2768 (H) 04/25/2016    NTBNP 1302 (H) 06/03/2016    TROPONIN <0.07 12/05/2005    TROPI 0.634 (HH) 10/08/2017    AST 46 (H) 10/24/2017    ALT 42 10/24/2017    ALKPHOS 85 10/06/2017    BILITOTAL 1.5 (H)  10/06/2017    INR 1.03 10/08/2017           dexmedetomidine 0.4 mcg/kg/hr (10/28/17 1216)     IV fluid REPLACEMENT ONLY           Assessment and Plan: Mr. Cricket Miguel is a 62 year old gentleman w/ h/o AVR and ascending aortic repair in 5/2016 admitted with acute mental status changes found to have large moises-aortic fluid collection and multiple strokes likely septic emboli.  Concern for moises-infarct edema leading to expansion of the cerebellar infarct.    Neuro:     Multifocal CVA: L cerebellar, L MCA, and R occipital regions.  Concern for septic emboli.  Small microhemorrhages were observed.  Progressive edema noted.  Neurosurgery and neurocritical care are following.  Angio showed no evidence of mycotic aneurysms.  Neuro exam stable.    Neurocrit team comments:  From his stroke, expected deficit include some language difficulty, right and left confusion, some trouble with calculation, possible visual field cut, and coordination with gait imbalance. Motor weakness from the stroke should include right hemiparesis/hemiplegia. 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 Neurological prognosis is good with very slow recovery, which will take months.    Physical exam showing mild improvement.  Patient opens eyes and periodically nods appropriately to questions.      ASA 81mg    Heparin 5000 U sq q8 hours.  Held for PEG and Trach.  Restart after.    Acetaminophen, Ibuprofen, Fentanyl prn    Precedex gtt for anxiety on ventilator    Resp:    Intubated and mechanically ventilated.    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  1st degree AVB.     Tagged WBC scan showed likely infectious etiology.    Surgical date will be scheduled after patient has shown stabilization and  recovery from neurologic insult.    BP lability    Hypertensive overnight and hypotensive this am    Given 500 mL Albumin this am for hypotension    NE ordered as backup if hypotension persists    Consider diuresis this evening due to net positive fluid status    Tachycardia    Metoprolol started 10/19  GI/FEN:     Post pyloric feeding tube in place, continue at goal    PEG on Monday  Renal:     Monitor electrolytes, Cr, and urine output     Lytes goals K>4 and Mag>2    JOSE:     Monitor Urine Output. Cr      Volume up today.  Possible diuresis this afternoon after PEG/Trach.    Hypernatremia, resolved    FWF decreased to 30 q4    Goal Na+ 145-155 per [facility]  Endo:    Glucose checks, SSI to serum glucose >80, <180 mg/dL  ID:     Sepsis: MSSA bacteremia with likely source of the L knee septic arthritis. Knee is s/p arthroscopic irrigation and debridement 10/8. Renal function has recovered.  Otherwise hemodynamically stable without pressors.      ID consultant recommended:    Continue Nafcillin & Rifampin a minimum of 3 months and even longer if pt hasn't had surgery by that point (i.e. Continue through surgery)    Continue Levofloxacin for 4 weeks from start of therapy (10/07-11/04)    Stop Gentamicin    F/U in ID clinic if discharged to LTAC     Nystatin (oral candidiasis)     PET scan 10/19 revealed likely infectious fluid around ascending aorta    Tooth Remnant, left lower canine    OMF o/b, rec continuing abx and outpatient f/u    Monitor WBC daily   Heme:     Hgb:  stable  Prophylaxis: SCDs, Famotidine  Lines:      CVC 21 days    Espinoza 21 days      Dispo: Continue ICU care.    Code Status: Full Code    Walter Bullock   Surgery PGY3  [phone]      Patient seen and staffed with attending.

## 2017-10-28 NOTE — PROGRESS NOTES
CVICU attending note    Mr. Cricket Miguel is a 62 year old gentleman with PMH significant for AR, Aortic root dilation, CHF, tobacco abuse, COPD s/p AVR and ascending aortic repair in 5/2016 admitted with acute mental status changes found to have large moises-aortic fluid collection and multiple strokes likely septic emboli. Dr. Larios and I met with the family on 10/24; the CV surgeon recommended to postpone the surgery until neurological improvement is achived.       Active problems and current treatments include:      1. Embolic strokes involved the L cerebellar, L MCA, and R occipital regions. Right side hemiplegia left side weakness.  Opens eyes spontaneously, and grimaces. Stable strokes on MRI. MRI cervical and thoracic spine with no involvement of the spinal cord. 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 On ASA. Precedex for sedation. Up into chair. PT and range of motion. Plan:  Monitor Neuro function.  Minimize sedation.         2. MSSA endocarditis, large periaortic fluid collection with septic emboli and MSSA bacteremia (last positive BC on 10/13). Continue Nafcillin, Levofloxacin, Rifampicin. Plan: The CV surgery plan is to postpone the surgery until neurological improvement is achived. Continue antibiotics.       3. Respiratory failure secondary to airway protection impairment. Chest X-ray clear, stable. Tracheostomy on 10/27. Plan: PS as tolerated. Up into chair and trach dome.     4. Labile BP with episodes of hypertension followed by hypotension.  H/o HTN, on low dose betablocker. Short episode of SVT this AM. Plan: Adjust Precedex dose according to the sedation and VS needs.      4. Non oliguric JOSE. Plan: Monitor BUN/Cr, lytes, UOP.       5. Anemia of chronic illness. No signs of bleeding. Plan: Monitor.       6. Malnutrition. Plan: Continue TF. PEG placement on Monday.      I personally managed the ventilator, sedation,  analgesia, metabolic abnormalities and  nutritional status.       The history and the 10 points review of systems are included in the note of Dr. Dsouza.       I agree with the resident assessment and plan. I spent 35 minutes of critical care time evaluating and managing this patient, discussing with the consultants and the patient's family.      Karen Wright MD  Anesthesiology Critical Care Medicine  Pg. [phone]

## 2017-10-28 NOTE — PLAN OF CARE
Problem: Patient Care Overview  Goal: Plan of Care/Patient Progress Review  Outcome: No Change  19:30-23:00 shift  D/I: Hypertensive on this shift. Minimal response to hydralazine other than tachycardia. Pink secretions from new trach. Routine cares and trach education.   P: Per report no PEG placement till Monday.  SO Meghna would like dental issues of broken tooth and malfitting dentures addressed prior to discharge.  Encouraged to discuss with primary team tomorrow.

## 2017-10-28 NOTE — PROGRESS NOTES
CVICU PROGRESS NOTE  10/27/2017  Attending: Ramesh Kuo, *    S:   Self extubated this AM. No desaturation. Sedated on visit    O:   Vitals:    10/27/17 1815 10/27/17 1830 10/27/17 1845 10/27/17 1900   BP:    (!) 127/95   BP Location:       Pulse:       Resp: 26 13     Temp:       TempSrc:       SpO2: 100% 100% 100% 99%   Weight:       Height:         Vitals:    10/25/17 0000 10/26/17 0000 10/27/17 0500   Weight: 94.9 kg (209 lb 3.5 oz) 92.4 kg (203 lb 11.3 oz) 91.9 kg (202 lb 9.6 oz)     -1 kg since admit      Intake/Output Summary (Last 24 hours) at 10/27/17 1950  Last data filed at 10/27/17 1700   Gross per 24 hour   Intake          3757.63 ml   Output             2250 ml   Net          1507.63 ml       Ventilation Mode: CMV/AC (found on CMV - presumedly switched to CMV for trach )  FiO2 (%): 40 %  Rate Set (breaths/minute): 12 breaths/min  Tidal Volume Set (mL): 500 mL  PEEP (cm H2O): 7 cmH2O  Pressure Support (cm H2O): 7 cmH2O  Oxygen Concentration (%): 40 %  Resp: 13    Recent Labs  Lab 10/26/17  0358 10/25/17  0346 10/24/17  0405 10/23/17  0341   PH 7.40 7.41 7.42 7.43   PCO2 43 41 40 37   PO2 67* 105 98 128*   HCO3 27 26 26 24   O2PER 35 35 35.0 35       General: Intubated, lightly sedated, does not follow commands but tracking and trying to mouth words.opens eyes spontaneously.   Neck: Supple, no tracheal deviation, mild left jaw swelling  Cardiovascular: RRR  Pumonary: Non labored breathing on MV  Abdomen: Soft, non distended, non tender  Ext: Minimal edema, warm  Neuro: Does not follow commands     Labs:  Lab Results   Component Value Date    WBC 6.8 10/27/2017     Lab Results   Component Value Date    RBC 2.84 10/27/2017     Lab Results   Component Value Date    HGB 8.7 10/27/2017     Lab Results   Component Value Date    HCT 27.7 10/27/2017     No components found for: MCT  Lab Results   Component Value Date    MCV 98 10/27/2017     Lab Results   Component Value Date    MCH 30.6 10/27/2017      Lab Results   Component Value Date    MCHC 31.4 10/27/2017     Lab Results   Component Value Date    RDW 19.7 10/27/2017     Lab Results   Component Value Date     10/27/2017     Last Basic Metabolic Panel:  Lab Results   Component Value Date     10/27/2017      Lab Results   Component Value Date    POTASSIUM 4.1 10/27/2017     Lab Results   Component Value Date    CHLORIDE 109 10/27/2017     Lab Results   Component Value Date    IZABELLA 7.8 10/27/2017     Lab Results   Component Value Date    CO2 27 10/27/2017     Lab Results   Component Value Date    BUN 30 10/27/2017     Lab Results   Component Value Date    CR 1.46 10/27/2017     Lab Results   Component Value Date     10/27/2017       GLUCOSE:     Recent Labs  Lab 10/27/17  1139 10/27/17  0752 10/27/17  0430 10/27/17  0428 10/26/17  2345 10/26/17  1954 10/26/17  1513  10/26/17  0358  10/25/17  1550  10/25/17  0346  10/24/17  2242   GLC  --   --  164*  --   --   --  130*  --  135*  --  141*  --  139*  --  142*   * 151*  --  149* 162* 136* 129*  < >  --   < >  --   < >  --   < >  --    < > = values in this interval not displayed.          A/P:   Cricket Miguel is a 62 year old with history of AVR and ascending aortic repair in May 2016 presented on 10/6 with acute mental status changes found to have large moises-aortic fluid collection and multiple strokes likely septic emboli.    Neuro: Tylenol scheduled for fevers,continue neuro checks Q2H,  precedex and wake up as able. Okay to use ibuprofen for fevers if needed per neuro. MRI head and spine with interval changes as expected, no new findings.   Resp: Inability to protect airway requiring mechanical ventilation, on minimal vent settings, did well with pressure support. Will continue to do trials today. Trach today, PEG deferred due to hypotension.  CV: Goal normotension per neurology/neurosurgery. Timing of surgery tbd. ASA. Low dose metoprolol BID for intermittent SVT.  GI/FEN: Tube  feeds to goal, continue free water for hypernatremia (goal 145-155, go slow per neuro),  Renal: JOSE non-oliguric.  Endo: SSI  ID: Continue current antibiotic regimen pending ID recommendations (Nafcillin, levofloxacin, gentamycin, rifampin), daily blood cultures negative since 10/13. Added rifampin to abx per ID. Stopped daily cultures since they have been negative. WBC slowly decreasing. PET scan done showed increased uptake around the heart and knee. OMFS consulted for tooth infection as per Dentistry. CT neck shows no abscess, cellulitis.   Heme: No bleeding concerns at this time, hold anticoagulation per neuro and neurosurgery  Prophylaxis: H2 blocker, heparin SC      Dispo: CVICU    Discussed with CVTS fellow.   Staff surgeons have been informed of changes through both  verbal and written communication.      Walter Bullock   Surgery PGY3  [phone]

## 2017-10-28 NOTE — PLAN OF CARE
Problem: Patient Care Overview  Goal: Plan of Care/Patient Progress Review  Outcome: No Change  D: Pt has had moderate amount of pink cloudy sputum overnight. Short run of SVT with HR in the 160's overnight with low blood pressures 80/40's and maps in the 50's.  I: Frequent suctioning to help manage secretions.  A: SVT resolved after pt was suctioned. Has not happened again.  P: Peg placement Monday, awaiting LTAC placement.

## 2017-10-28 NOTE — PLAN OF CARE
Problem: Sepsis/Septic Shock (Adult)  Goal: Signs and Symptoms of Listed Potential Problems Will be Absent, Minimized or Managed (Sepsis/Septic Shock)  Signs and symptoms of listed potential problems will be absent, minimized or managed by discharge/transition of care (reference Sepsis/Septic Shock (Adult) CPG).   Outcome: Improving  D/I: Blood pressure labile. MAP 50's, MD called. Albumin 250cc IV x1, MAP >65 now, (see VS flow sheets).   A: Pt responded to fluid bolus. BP now within parameters.   P: Continue to monitor and update MD with concerns.

## 2017-10-28 NOTE — PROGRESS NOTES
CVTS Daily Note  10/28/2017  Attending: Ramesh Kuo, *    S:   Trach placed yesterday without issues. Labile blood pressures. More awake today    O:   Vitals:    10/28/17 1400 10/28/17 1500 10/28/17 1600 10/28/17 1700   BP:       BP Location:       Pulse:       Resp: 9 18 16 15   Temp:       TempSrc:   Axillary    SpO2: 100% 100% 100% 100%   Weight:       Height:         Vitals:    10/26/17 0000 10/27/17 0500 10/28/17 0400   Weight: 92.4 kg (203 lb 11.3 oz) 91.9 kg (202 lb 9.6 oz) 91.8 kg (202 lb 6.1 oz)     -1.1 kg since admit      Intake/Output Summary (Last 24 hours) at 10/28/17 1745  Last data filed at 10/28/17 1600   Gross per 24 hour   Intake          2177.04 ml   Output              660 ml   Net          1517.04 ml       General: Intubated, lightly sedated, does not follow commands but tracking and trying to mouth words.opens eyes spontaneously.   Neck: Supple, no tracheal deviation, mild left jaw swelling  Cardiovascular: RRR  Pumonary: Non labored breathing on MV  Abdomen: Soft, non distended, non tender  Ext: Minimal edema, warm  Neuro: Does not follow commands      Labs:  BMP  Recent Labs  Lab 10/28/17  0336 10/27/17  1449 10/27/17  0430 10/26/17  1513 10/26/17  0358     --  142 142 145*   POTASSIUM 3.5 4.1 3.7 3.7 3.4   CHLORIDE 110*  --  109 109 110*   CO2 25  --  27 27 26   BUN 28  --  30 31* 31*   CR 1.44*  --  1.46* 1.44* 1.53*   *  --  164* 130* 135*   MAG 2.2 2.1 2.2 2.1 2.2   PHOS 3.3 4.3 3.4 3.5 3.8     CBC  Recent Labs  Lab 10/28/17  0336 10/27/17  0430 10/26/17  0358 10/25/17  0346   WBC 8.7 6.8 7.5 9.7   HGB 9.0* 8.7* 9.1* 9.5*    309 319 340     INR/PTTNo lab results found in last 7 days.  ABG  Recent Labs  Lab 10/26/17  0358 10/25/17  0346 10/24/17  0405 10/23/17  0341   PH 7.40 7.41 7.42 7.43   PCO2 43 41 40 37   PO2 67* 105 98 128*   HCO3 27 26 26 24     LFT  Recent Labs  Lab 10/24/17  0405   AST 46*   ALT 42       GLUCOSE:     Recent Labs  Lab 10/28/17  0337  10/27/17  1941 10/27/17  1558 10/27/17  1139 10/27/17  0752 10/27/17  0430 10/27/17  0428 10/26/17  2345  10/26/17  1513  10/26/17  0358  10/25/17  1550  10/25/17  0346   *  --   --   --   --  164*  --   --   --  130*  --  135*  --  141*  --  139*   BGM  --  145* 106* 130* 151*  --  149* 162*  < > 129*  < >  --   < >  --   < >  --    < > = values in this interval not displayed.      A/P:   Cricket Miguel is a 62 year old with history of AVR and ascending aortic repair in May 2016 presented on 10/6 with acute mental status changes found to have large moises-aortic fluid collection and multiple strokes likely septic emboli.    Neuro: Tylenol scheduled for fevers,continue neuro checks Q2H,  precedex and wake up as able. Okay to use ibuprofen for fevers if needed per neuro. MRI head and spine with interval changes as expected, no new findings.   Resp: Inability to protect airway requiring mechanical ventilation, on minimal vent settings, did well with pressure support. Trach placed, PS then trach dome as tolerated  CV: Goal normotension per neurology/neurosurgery. Timing of surgery tbd. ASA. Low dose metoprolol BID for intermittent SVT.  GI/FEN: Tube feeds to goal, continue free water for hypernatremia (goal 145-155, go slow per neuro),  Plan for PEG Monday   Renal: JOSE non-oliguric.  Endo: SSI  ID: Continue current antibiotic regimen pending ID recommendations (Nafcillin, levofloxacin, gentamycin, rifampin), daily blood cultures negative since 10/13. Added rifampin to abx per ID. Stopped daily cultures since they have been negative. WBC slowly decreasing. PET scan done showed increased uptake around the heart and knee. OMFS consulted for tooth infection as per Dentistry. CT neck shows no abscess, cellulitis.   Heme: No bleeding concerns at this time, hold anticoagulation per neuro and neurosurgery  Prophylaxis: H2 blocker, heparin SC      Dispo: CVICU       Discussed with CVTS Fellow   Staff surgeons have been  informed of changes through both  verbal and written communication.        Walter Bullock   Surgery PGY3  [phone]

## 2017-10-28 NOTE — PROGRESS NOTES
POD 1 from percutaneous tracheostomy placement with Dr. Marcelino. Site looks good. Small amount of sanguinous drainage on dressing as expected. Patient more alert today. We will not continue to follow daily but are available if questions arise. Please contact the surg onc resident on call with questions. Sutures can be removed in 7-10 days.    Lorie Bennett MD  General Surgery, PGY-2  Pg [phone]

## 2017-10-29 ENCOUNTER — APPOINTMENT (OUTPATIENT)
Dept: GENERAL RADIOLOGY | Facility: CLINIC | Age: 62
DRG: 004 | End: 2017-10-29
Attending: PSYCHIATRY & NEUROLOGY
Payer: COMMERCIAL

## 2017-10-29 LAB
ANION GAP SERPL CALCULATED.3IONS-SCNC: 7 MMOL/L (ref 3–14)
BUN SERPL-MCNC: 29 MG/DL (ref 7–30)
CALCIUM SERPL-MCNC: 7.9 MG/DL (ref 8.5–10.1)
CHLORIDE SERPL-SCNC: 111 MMOL/L (ref 94–109)
CO2 SERPL-SCNC: 27 MMOL/L (ref 20–32)
CREAT SERPL-MCNC: 1.29 MG/DL (ref 0.66–1.25)
ERYTHROCYTE [DISTWIDTH] IN BLOOD BY AUTOMATED COUNT: 20.3 % (ref 10–15)
GFR SERPL CREATININE-BSD FRML MDRD: 56 ML/MIN/1.7M2
GLUCOSE BLDC GLUCOMTR-MCNC: 136 MG/DL (ref 70–99)
GLUCOSE BLDC GLUCOMTR-MCNC: 150 MG/DL (ref 70–99)
GLUCOSE SERPL-MCNC: 164 MG/DL (ref 70–99)
HCT VFR BLD AUTO: 27.5 % (ref 40–53)
HGB BLD-MCNC: 8.6 G/DL (ref 13.3–17.7)
MAGNESIUM SERPL-MCNC: 2.2 MG/DL (ref 1.6–2.3)
MCH RBC QN AUTO: 30.3 PG (ref 26.5–33)
MCHC RBC AUTO-ENTMCNC: 31.3 G/DL (ref 31.5–36.5)
MCV RBC AUTO: 97 FL (ref 78–100)
PHOSPHATE SERPL-MCNC: 3.6 MG/DL (ref 2.5–4.5)
PLATELET # BLD AUTO: 281 10E9/L (ref 150–450)
POTASSIUM SERPL-SCNC: 3.7 MMOL/L (ref 3.4–5.3)
RBC # BLD AUTO: 2.84 10E12/L (ref 4.4–5.9)
SODIUM SERPL-SCNC: 145 MMOL/L (ref 133–144)
WBC # BLD AUTO: 7.4 10E9/L (ref 4–11)

## 2017-10-29 PROCEDURE — 25000132 ZZH RX MED GY IP 250 OP 250 PS 637: Performed by: STUDENT IN AN ORGANIZED HEALTH CARE EDUCATION/TRAINING PROGRAM

## 2017-10-29 PROCEDURE — 27210429 ZZH NUTRITION PRODUCT INTERMEDIATE LITER

## 2017-10-29 PROCEDURE — 83735 ASSAY OF MAGNESIUM: CPT | Performed by: ORTHOPAEDIC SURGERY

## 2017-10-29 PROCEDURE — 80048 BASIC METABOLIC PNL TOTAL CA: CPT | Performed by: ORTHOPAEDIC SURGERY

## 2017-10-29 PROCEDURE — 25000128 H RX IP 250 OP 636

## 2017-10-29 PROCEDURE — 20000004 ZZH R&B ICU UMMC

## 2017-10-29 PROCEDURE — 25000132 ZZH RX MED GY IP 250 OP 250 PS 637: Performed by: ANESTHESIOLOGY

## 2017-10-29 PROCEDURE — S0032 INJECTION, NAFCILLIN SODIUM: HCPCS | Performed by: THORACIC SURGERY (CARDIOTHORACIC VASCULAR SURGERY)

## 2017-10-29 PROCEDURE — 71010 XR CHEST PORT 1 VW: CPT

## 2017-10-29 PROCEDURE — 25000125 ZZHC RX 250: Performed by: STUDENT IN AN ORGANIZED HEALTH CARE EDUCATION/TRAINING PROGRAM

## 2017-10-29 PROCEDURE — 25000128 H RX IP 250 OP 636: Performed by: ANESTHESIOLOGY

## 2017-10-29 PROCEDURE — 40000275 ZZH STATISTIC RCP TIME EA 10 MIN

## 2017-10-29 PROCEDURE — 94003 VENT MGMT INPAT SUBQ DAY: CPT

## 2017-10-29 PROCEDURE — 85027 COMPLETE CBC AUTOMATED: CPT | Performed by: ORTHOPAEDIC SURGERY

## 2017-10-29 PROCEDURE — P9041 ALBUMIN (HUMAN),5%, 50ML: HCPCS

## 2017-10-29 PROCEDURE — 00000146 ZZHCL STATISTIC GLUCOSE BY METER IP

## 2017-10-29 PROCEDURE — 27210913 ZZH NUTRITION PRODUCT INTERMEDIATE PACKET

## 2017-10-29 PROCEDURE — 25000132 ZZH RX MED GY IP 250 OP 250 PS 637: Performed by: THORACIC SURGERY (CARDIOTHORACIC VASCULAR SURGERY)

## 2017-10-29 PROCEDURE — 99291 CRITICAL CARE FIRST HOUR: CPT | Mod: GC | Performed by: ANESTHESIOLOGY

## 2017-10-29 PROCEDURE — 84100 ASSAY OF PHOSPHORUS: CPT | Performed by: ORTHOPAEDIC SURGERY

## 2017-10-29 PROCEDURE — 40000281 ZZH STATISTIC TRANSPORT TIME EA 15 MIN

## 2017-10-29 PROCEDURE — 40000014 ZZH STATISTIC ARTERIAL MONITORING DAILY

## 2017-10-29 PROCEDURE — 25000128 H RX IP 250 OP 636: Performed by: THORACIC SURGERY (CARDIOTHORACIC VASCULAR SURGERY)

## 2017-10-29 PROCEDURE — 25000125 ZZHC RX 250: Performed by: THORACIC SURGERY (CARDIOTHORACIC VASCULAR SURGERY)

## 2017-10-29 PROCEDURE — 25000132 ZZH RX MED GY IP 250 OP 250 PS 637: Performed by: SURGERY

## 2017-10-29 RX ORDER — ALBUMIN, HUMAN INJ 5% 5 %
SOLUTION INTRAVENOUS
Status: COMPLETED
Start: 2017-10-29 | End: 2017-10-29

## 2017-10-29 RX ORDER — QUETIAPINE FUMARATE 25 MG/1
25 TABLET, FILM COATED ORAL 3 TIMES DAILY
Status: DISCONTINUED | OUTPATIENT
Start: 2017-10-29 | End: 2017-10-30

## 2017-10-29 RX ORDER — LABETALOL HYDROCHLORIDE 5 MG/ML
5-10 INJECTION, SOLUTION INTRAVENOUS EVERY 4 HOURS PRN
Status: DISCONTINUED | OUTPATIENT
Start: 2017-10-29 | End: 2017-10-31

## 2017-10-29 RX ORDER — ALBUMIN, HUMAN INJ 5% 5 %
12.5 SOLUTION INTRAVENOUS ONCE
Status: COMPLETED | OUTPATIENT
Start: 2017-10-29 | End: 2017-10-29

## 2017-10-29 RX ADMIN — NAFCILLIN: 10 INJECTION, POWDER, FOR SOLUTION INTRAVENOUS at 16:52

## 2017-10-29 RX ADMIN — QUETIAPINE FUMARATE 25 MG: 25 TABLET ORAL at 10:40

## 2017-10-29 RX ADMIN — MULTIVITAMIN 15 ML: LIQUID ORAL at 07:36

## 2017-10-29 RX ADMIN — DEXMEDETOMIDINE HYDROCHLORIDE 0.7 MCG/KG/HR: 4 INJECTION, SOLUTION INTRAVENOUS at 16:48

## 2017-10-29 RX ADMIN — NAFCILLIN: 10 INJECTION, POWDER, FOR SOLUTION INTRAVENOUS at 00:07

## 2017-10-29 RX ADMIN — NYSTATIN 500000 UNITS: 100000 SUSPENSION ORAL at 02:03

## 2017-10-29 RX ADMIN — INSULIN ASPART 1 UNITS: 100 INJECTION, SOLUTION INTRAVENOUS; SUBCUTANEOUS at 07:38

## 2017-10-29 RX ADMIN — NAFCILLIN: 10 INJECTION, POWDER, FOR SOLUTION INTRAVENOUS at 04:01

## 2017-10-29 RX ADMIN — Medication: at 20:38

## 2017-10-29 RX ADMIN — NAFCILLIN: 10 INJECTION, POWDER, FOR SOLUTION INTRAVENOUS at 13:29

## 2017-10-29 RX ADMIN — NYSTATIN 500000 UNITS: 100000 SUSPENSION ORAL at 13:58

## 2017-10-29 RX ADMIN — Medication: at 07:35

## 2017-10-29 RX ADMIN — HEPARIN SODIUM 5000 UNITS: 5000 INJECTION, SOLUTION INTRAVENOUS; SUBCUTANEOUS at 04:01

## 2017-10-29 RX ADMIN — HEPARIN SODIUM 5000 UNITS: 5000 INJECTION, SOLUTION INTRAVENOUS; SUBCUTANEOUS at 20:38

## 2017-10-29 RX ADMIN — Medication 300 MG: at 20:27

## 2017-10-29 RX ADMIN — NYSTATIN 500000 UNITS: 100000 SUSPENSION ORAL at 07:36

## 2017-10-29 RX ADMIN — METOPROLOL TARTRATE 12.5 MG: 25 TABLET, FILM COATED ORAL at 19:55

## 2017-10-29 RX ADMIN — DEXMEDETOMIDINE HYDROCHLORIDE 0.7 MCG/KG/HR: 4 INJECTION, SOLUTION INTRAVENOUS at 23:37

## 2017-10-29 RX ADMIN — INSULIN ASPART 1 UNITS: 100 INJECTION, SOLUTION INTRAVENOUS; SUBCUTANEOUS at 00:06

## 2017-10-29 RX ADMIN — QUETIAPINE FUMARATE 25 MG: 25 TABLET ORAL at 20:27

## 2017-10-29 RX ADMIN — QUETIAPINE FUMARATE 25 MG: 25 TABLET ORAL at 13:58

## 2017-10-29 RX ADMIN — Medication 300 MG: at 07:39

## 2017-10-29 RX ADMIN — ACETAMINOPHEN 975 MG: 325 SOLUTION ORAL at 00:09

## 2017-10-29 RX ADMIN — INSULIN ASPART 1 UNITS: 100 INJECTION, SOLUTION INTRAVENOUS; SUBCUTANEOUS at 12:02

## 2017-10-29 RX ADMIN — NAFCILLIN: 10 INJECTION, POWDER, FOR SOLUTION INTRAVENOUS at 20:38

## 2017-10-29 RX ADMIN — Medication 1 PACKET: at 07:39

## 2017-10-29 RX ADMIN — NAFCILLIN: 10 INJECTION, POWDER, FOR SOLUTION INTRAVENOUS at 23:43

## 2017-10-29 RX ADMIN — INSULIN ASPART 1 UNITS: 100 INJECTION, SOLUTION INTRAVENOUS; SUBCUTANEOUS at 20:39

## 2017-10-29 RX ADMIN — ACETAMINOPHEN 975 MG: 325 SOLUTION ORAL at 07:34

## 2017-10-29 RX ADMIN — NYSTATIN 500000 UNITS: 100000 SUSPENSION ORAL at 20:38

## 2017-10-29 RX ADMIN — METOPROLOL TARTRATE 12.5 MG: 25 TABLET, FILM COATED ORAL at 07:36

## 2017-10-29 RX ADMIN — ACETAMINOPHEN 975 MG: 325 SOLUTION ORAL at 16:51

## 2017-10-29 RX ADMIN — ALBUMIN HUMAN 12.5 G: 50 SOLUTION INTRAVENOUS at 09:07

## 2017-10-29 RX ADMIN — DEXMEDETOMIDINE HYDROCHLORIDE 0.7 MCG/KG/HR: 4 INJECTION, SOLUTION INTRAVENOUS at 07:37

## 2017-10-29 RX ADMIN — HEPARIN SODIUM 5000 UNITS: 5000 INJECTION, SOLUTION INTRAVENOUS; SUBCUTANEOUS at 11:58

## 2017-10-29 RX ADMIN — DEXMEDETOMIDINE HYDROCHLORIDE 0.4 MCG/KG/HR: 4 INJECTION, SOLUTION INTRAVENOUS at 05:44

## 2017-10-29 RX ADMIN — ACETAMINOPHEN 975 MG: 325 SOLUTION ORAL at 23:43

## 2017-10-29 RX ADMIN — ASPIRIN 81 MG CHEWABLE TABLET 81 MG: 81 TABLET CHEWABLE at 07:34

## 2017-10-29 RX ADMIN — ALBUMIN (HUMAN) 12.5 G: 12.5 SOLUTION INTRAVENOUS at 09:07

## 2017-10-29 RX ADMIN — LEVOFLOXACIN 750 MG: 5 INJECTION, SOLUTION INTRAVENOUS at 10:42

## 2017-10-29 RX ADMIN — INSULIN ASPART 1 UNITS: 100 INJECTION, SOLUTION INTRAVENOUS; SUBCUTANEOUS at 04:03

## 2017-10-29 RX ADMIN — POTASSIUM CHLORIDE 20 MEQ: 1.5 POWDER, FOR SOLUTION ORAL at 05:43

## 2017-10-29 RX ADMIN — NAFCILLIN: 10 INJECTION, POWDER, FOR SOLUTION INTRAVENOUS at 07:50

## 2017-10-29 RX ADMIN — FAMOTIDINE 20 MG: 40 POWDER, FOR SUSPENSION ORAL at 09:08

## 2017-10-29 RX ADMIN — OXYCODONE HYDROCHLORIDE 5 MG: 5 TABLET ORAL at 05:43

## 2017-10-29 NOTE — PROGRESS NOTES
CVICU attending note.     Mr. Cricket Miguel is a 62 year old gentleman with PMH significant for AR, Aortic root dilation, CHF, tobacco abuse, COPD s/p AVR and ascending aortic repair in 5/2016 admitted with acute mental status changes found to have large moises-aortic fluid collection and multiple strokes likely septic emboli. Dr. Larios and I met with the family on 10/24; the CV surgeon recommended to postpone the surgery until neurological improvement is achived.       Active problems and current treatments include:      1. Embolic strokes involved the L cerebellar, L MCA, and R occipital regions. Right side hemiplegia left side weakness.  Opens eyes spontaneously, and grimaces. Stable strokes on MRI. MRI cervical and thoracic spine with no involvement of the spinal cord. 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 On ASA. Precedex for sedation. Up into chair. PT and range of motion. Plan:  Monitor Neuro function. Start Seroquel for agitation. Precedex on the lowest dose necessary. NO narcotics.       2. MSSA endocarditis, large periaortic fluid collection with septic emboli and MSSA bacteremia (last positive BC on 10/13). Continue Nafcillin, Levofloxacin, Rifampicin. Plan: The CV surgery plan is to postpone the surgery until neurological improvement is achived. Continue antibiotics.       3. Respiratory failure secondary to airway protection impairment. Chest X-ray clear, stable. Tracheostomy on 10/27. Plan: PS as tolerated. Up into chair and trach dome.      4. Labile BP with episodes of hypertension followed by hypotension.  H/o HTN, on low dose betablocker. Short episode of SVT on 10/28. Became hypotensive after Labetalol and Hydralazine was given. Plan: Adjust Precedex dose according to the sedation and VS needs. D/c PRN labetalol and hydralazine.       4. Non oliguric JOSE improving. Plan: Monitor BUN/Cr, lytes, UOP.       5. Anemia of chronic illness. No signs of bleeding.  Plan: Monitor.       6. Malnutrition. Plan: Continue TF. PEG placement on Monday.      I personally managed the ventilator, sedation,  analgesia, metabolic abnormalities and nutritional status.       The history and the 10 points review of systems are included in the note of Dr. Dsouza.       I agree with the resident assessment and plan. I spent 40 minutes of critical care time evaluating and managing this patient, discussing with the consultants and the patient's family.    Karen Wright MD  Anesthesiology Critical Care Medicine  Pg. [phone]

## 2017-10-29 NOTE — PROGRESS NOTES
CV ICU Progress Note    POD: 2 Days Post-Op  LOS: 23    Admission History: Mr. Cricket Miguel is a 62 year old gentleman w/ h/o AVR and ascending aortic repair in 2016 admitted with acute mental status changes found to have large moises-aortic fluid collection and multiple strokes likely septic emboli.  MRI findings showed left posterior MCA, left PICA, and small right PCA infarcts.    S/Interval History:   Labile blood pressures yesterday requiring albumin boluses.    O:    Temp: 97.8  F (36.6  C) Temp  Min: 97.8  F (36.6  C)  Max: 98.8  F (37.1  C)  Resp: 12 Resp  Min: 9  Max: 19  SpO2: 100 % SpO2  Min: 98 %  Max: 100 %    No Data Recorded  Heart Rate: 70 Heart Rate  Min: 67  Max: 99  BP: (!) 82/54 Systolic (24hrs), Av , Min:82 , Max:82   Diastolic (24hrs), Av, Min:54, Max:54    Ventilation Mode: CPAP/PS  FiO2 (%): 40 %  Rate Set (breaths/minute): 12 breaths/min  Tidal Volume Set (mL): 500 mL  PEEP (cm H2O): 7 cmH2O  Pressure Support (cm H2O): 7 cmH2O  Oxygen Concentration (%): 40 %  Resp: 12  Date 10/16/17 07 - 10/17/17 0659   Shift 0316-9862 6583-1473 1202-2878 24 Hour Total   I  N  T  A  K  E   I.V. 114.6   114.6    NG/   400    Enteral 240   240    Shift Total  (mL/kg) 754.6  (8.34)   754.6  (8.34)   O  U  T  P  U  T   Urine 575   575    Shift Total  (mL/kg) 575  (6.35)   575  (6.35)   Weight (kg) 90.5 90.5 90.5 90.5         Past Medical History:   Diagnosis Date     AI (aortic insufficiency)      Aortic root dilatation (H)      AR (aortic regurgitation)     severe     Cardiomyopathy (H)      CHF (congestive heart failure), NYHA class II (H)      COPD, mild (H)     spirometry      Heart murmur      Hyperlipidemia LDL goal <70 10/31/2010     Hypertension goal BP (blood pressure) < 140/90      Inguinal hernia      left lung nodule  2008     Major depressive disorder, single episode, moderate (H)      Psoriasis childhood     Tobacco use disorder        Past Surgical History:   Procedure  Laterality Date     ARTHROSCOPY KNEE INCISION AND DRAINAGE Left 10/8/2017    Procedure: ARTHROSCOPY KNEE INCISION AND DRAINAGE;  Arthroscopic Incision and Drainage of Left Knee;  Surgeon: Lucretia Munoz MD;  Location: [facility] OR     HC SHOULDER ARTHROSCOPY, DX  2001    Arthroscopy, Shoulder RT Dr OSIRIS Andersen     HC SHOULDER ARTHROSCOPY, DX  1/04    LT arthroscopy Dr OSIRIS Andersen     HERNIA REPAIR, UMBILICAL  1/08    incarcerated - dr rockwell     HERNIORRHAPHY INGUINAL  12/21/2012    HERNIORRHAPHY INGUINAL;  Right Inguinal Hernia Repair with mesh ;  Surgeon: Mello Rockwell MD;  Location: [facility] OR     REPLACE VALVE AORTIC N/A 5/17/2016    REPLACE VALVE AORTIC - Dr Bowers     ROTATOR CUFF REPAIR RT/LT  11/10    Rt arthroscopy - Dr OSIRIS Andersen     SURGICAL HISTORY OF -   5/16    AVR, severe AR, aortic root repair     TRACHEOSTOMY PERCUTANEOUS  10/27/2017            Physical Exam    General: Intubated, lightly sedated, in no distress   Neck: Supple, no tracheal deviation  Cardiovascular: Regular tachy, RRR  Pumonary: Non labored breathing w/ PS.  CTAB   Abdomen: Soft, non distended, non tender.   Ext: Upper and lower extremity mild edema  Neuro: Small sluggishly reactive pupil bilaterally approximately 2mm - Left oculocephalic & LT Corneal abscent with mild intermittent nystagmus to the RT side.  Tracks.  Opens eyes to voice.  Nods appropriately to questions periodically.    Lab Results   Component Value Date    WBC 7.4 10/29/2017    HGB 8.6 (L) 10/29/2017    HCT 27.5 (L) 10/29/2017     10/29/2017     (H) 10/29/2017    POTASSIUM 3.7 10/29/2017    CHLORIDE 111 (H) 10/29/2017    CO2 27 10/29/2017    BUN 29 10/29/2017    CR 1.29 (H) 10/29/2017     (H) 10/29/2017     (H) 10/23/2017    DD 1.7 (H) 04/25/2016    NTBNPI 2768 (H) 04/25/2016    NTBNP 1302 (H) 06/03/2016    TROPONIN <0.07 12/05/2005    TROPI 0.634 (HH) 10/08/2017    AST 46 (H) 10/24/2017    ALT 42 10/24/2017    ALKPHOS 85 10/06/2017     BILITOTAL 1.5 (H) 10/06/2017    INR 1.03 10/08/2017           dexmedetomidine 0.4 mcg/kg/hr (10/29/17 0544)     IV fluid REPLACEMENT ONLY           Assessment and Plan: Mr. Cricket Miguel is a 62 year old gentleman w/ h/o AVR and ascending aortic repair in 5/2016 admitted with acute mental status changes found to have large moises-aortic fluid collection and multiple strokes likely septic emboli.  Concern for moises-infarct edema leading to expansion of the cerebellar infarct.    Neuro:     Multifocal CVA: L cerebellar, L MCA, and R occipital regions.  Concern for septic emboli.  Small microhemorrhages were observed.  Progressive edema noted.  Neurosurgery and neurocritical care are following.  Angio showed no evidence of mycotic aneurysms.  Neuro exam stable.    Neurocrit team comments:  From his stroke, expected deficit include some language difficulty, right and left confusion, some trouble with calculation, possible visual field cut, and coordination with gait imbalance. Motor weakness from the stroke should include right hemiparesis/hemiplegia. 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 Neurological prognosis is good with very slow recovery, which will take months.    Physical exam showing mild improvement.  Patient opens eyes and periodically nods appropriately to questions.      ASA 81mg    Heparin 5000 U sq q8 hours.  Held for PEG and Trach.  Restart after.    Acetaminophen, Ibuprofen, Fentanyl prn    Precedex gtt for anxiety on ventilator    Resp:    Intubated and mechanically ventilated.    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  1st degree AVB.     Tagged WBC scan showed likely infectious etiology.    Surgical date will be scheduled after patient has shown  stabilization and recovery from neurologic insult.    BP lability    Hypertensive overnight and hypotensive this am    Hold PRN antihypertensives    Titrate sedation for pressure control    Tachycardia    Metoprolol started 10/19  GI/FEN:     Post pyloric feeding tube in place, continue at goal    PEG on Monday  Renal:     Monitor electrolytes, Cr, and urine output     Lytes goals K>4 and Mag>2    JOSE:     Monitor Urine Output. Cr      Volume up today.  Possible diuresis this afternoon after PEG/Trach.    Hypernatremia, resolved    FWF decreased to 30 q4    Goal Na+ 145-155 per N[facility]  Endo:    Glucose checks, SSI to serum glucose >80, <180 mg/dL  ID:     Sepsis: MSSA bacteremia with likely source of the L knee septic arthritis. Knee is s/p arthroscopic irrigation and debridement 10/8. Renal function has recovered.  Otherwise hemodynamically stable without pressors.      ID consultant recommended:    Continue Nafcillin & Rifampin a minimum of 3 months and even longer if pt hasn't had surgery by that point (i.e. Continue through surgery)    Continue Levofloxacin for 4 weeks from start of therapy (10/07-11/04)    Stop Gentamicin    F/U in ID clinic if discharged to LTAC     Nystatin (oral candidiasis)     PET scan 10/19 revealed likely infectious fluid around ascending aorta    Tooth Remnant, left lower canine    OMF o/b, rec continuing abx and outpatient f/u    Monitor WBC daily   Heme:     Hgb:  stable  Prophylaxis: SCDs, Famotidine  Lines:      CVC 23 days    Espinoza 23 days      Dispo: Continue ICU care.    Code Status: Full Code    Walter Bullock   Surgery PGY3  [phone]      Patient seen and staffed with attending.

## 2017-10-29 NOTE — PLAN OF CARE
Problem: Patient Care Overview  Goal: Plan of Care/Patient Progress Review  Outcome: No Change  D: Hypertensive with SBP's in the 160's x 1 around 2330.  I: Labetalol 20 mg given x 1.  A: Only slight change in SBP's after Labetalol. Hydralazine 20 mg given x 1 and Oxycodone 5 mg given x 1. Effective, SBP's < 140 for remainder of shift.  P:Peg Monday

## 2017-10-29 NOTE — PROGRESS NOTES
CVTS Daily Note  10/29/2017  Attending: Ramesh Kuo, *    S:   Labile blood pressures yesterday requiring albumin boluses.  Sedated after 5 mg of oxycodone given  O:   Vitals:    10/29/17 1100 10/29/17 1200 10/29/17 1300 10/29/17 1400   BP:       BP Location:       Pulse:       Resp: 17 16 14 21   Temp:  99  F (37.2  C)     TempSrc:  Axillary     SpO2: 100% 99% 99% 99%   Weight:       Height:         Vitals:    10/27/17 0500 10/28/17 0400 10/29/17 0400   Weight: 91.9 kg (202 lb 9.6 oz) 91.8 kg (202 lb 6.1 oz) 93.2 kg (205 lb 7.5 oz)     + 0.3 kg since admit      Intake/Output Summary (Last 24 hours) at 10/29/17 1529  Last data filed at 10/29/17 1300   Gross per 24 hour   Intake          2046.12 ml   Output                0 ml   Net          2046.12 ml       MAPs:   General: Intubated, lightly sedated, does not follow commands but tracking and trying to mouth words.opens eyes spontaneously.   Neck: Supple, no tracheal deviation, mild left jaw swelling  Cardiovascular: RRR  Pumonary: Non labored breathing on MV  Abdomen: Soft, non distended, non tender  Ext: Minimal edema, warm  Neuro: Does not follow commands      Labs:  BMP  Recent Labs  Lab 10/29/17  0340 10/28/17  0336 10/27/17  1449 10/27/17  0430 10/26/17  1513   * 143  --  142 142   POTASSIUM 3.7 3.5 4.1 3.7 3.7   CHLORIDE 111* 110*  --  109 109   CO2 27 25  --  27 27   BUN 29 28  --  30 31*   CR 1.29* 1.44*  --  1.46* 1.44*   * 165*  --  164* 130*   MAG 2.2 2.2 2.1 2.2 2.1   PHOS 3.6 3.3 4.3 3.4 3.5     CBC  Recent Labs  Lab 10/29/17  0340 10/28/17  0336 10/27/17  0430 10/26/17  0358   WBC 7.4 8.7 6.8 7.5   HGB 8.6* 9.0* 8.7* 9.1*    295 309 319     INR/PTTNo lab results found in last 7 days.  ABG  Recent Labs  Lab 10/26/17  0358 10/25/17  0346 10/24/17  0405 10/23/17  0341   PH 7.40 7.41 7.42 7.43   PCO2 43 41 40 37   PO2 67* 105 98 128*   HCO3 27 26 26 24     LFT  Recent Labs  Lab 10/24/17  0405   AST 46*   ALT 42        GLUCOSE:     Recent Labs  Lab 10/29/17  0340 10/28/17  2009 10/28/17  1542 10/28/17  1152 10/28/17  0839 10/28/17  0414 10/28/17  0336 10/28/17  0044  10/27/17  0430  10/26/17  1513  10/26/17  0358  10/25/17  1550   *  --   --   --   --   --  165*  --   --  164*  --  130*  --  135*  --  141*   BGM  --  135* 158* 156* 150* 167*  --  152*  < >  --   < > 129*  < >  --   < >  --    < > = values in this interval not displayed.        A/P:   Cricket Miguel is a 62 year old with history of AVR and ascending aortic repair in May 2016 presented on 10/6 with acute mental status changes found to have large moises-aortic fluid collection and multiple strokes likely septic emboli.    Neuro: Tylenol scheduled for fevers,continue neuro checks Q2H,  precedex and wake up as able. Okay to use ibuprofen for fevers if needed per neuro. MRI head and spine with interval changes as expected, no new findings.   Resp: Inability to protect airway requiring mechanical ventilation, on minimal vent settings, did well with pressure support. Trach placed, PS then trach dome as tolerated  CV: Goal normotension per neurology/neurosurgery. Timing of surgery tbd. ASA. Low dose metoprolol BID for intermittent SVT.  GI/FEN: Tube feeds to goal, continue free water for hypernatremia (goal 145-155, go slow per neuro),  Plan for PEG Monday   Renal: JOSE non-oliguric.  Endo: SSI  ID: Continue current antibiotic regimen pending ID recommendations (Nafcillin, levofloxacin, gentamycin, rifampin), daily blood cultures negative since 10/13. Added rifampin to abx per ID. Stopped daily cultures since they have been negative. WBC slowly decreasing. PET scan done showed increased uptake around the heart and knee. OMFS consulted for tooth infection as per Dentistry. CT neck shows no abscess, cellulitis.   Heme: No bleeding concerns at this time, hold anticoagulation per neuro and neurosurgery  Prophylaxis: H2 blocker, heparin SC      Dispo:  CVICU         Discussed with CVTS Fellow   Staff surgeons have been informed of changes through both  verbal and written communication.        Walter Bullock   Surgery PGY3  [phone]

## 2017-10-29 NOTE — PLAN OF CARE
Problem: Stroke (Ischemic) (Adult)  Goal: Signs and Symptoms of Listed Potential Problems Will be Absent, Minimized or Managed (Stroke)  Signs and symptoms of listed potential problems will be absent, minimized or managed by discharge/transition of care (reference Stroke (Ischemic) (Adult) CPG).   Outcome: Improving  D/I: Pt put on trach dome 40%, sats %. BP labile. Albumin 5% 250cc for hypotension this am (see VS flow sheets). Precedex at 0.7 mcg/kg/mn for agitation. Pt moving left arm against gravity. Moves left leg slightly and right hand index finger spontaneously. Nothing to commands.   A: Pt blood pressure still labile. Neuro status, not following commands.   P: Continue with IV antibiotics. Trach dome as tolerated. Update MD with concerns.

## 2017-10-30 ENCOUNTER — APPOINTMENT (OUTPATIENT)
Dept: GENERAL RADIOLOGY | Facility: CLINIC | Age: 62
DRG: 004 | End: 2017-10-30
Attending: PSYCHIATRY & NEUROLOGY
Payer: COMMERCIAL

## 2017-10-30 ENCOUNTER — APPOINTMENT (OUTPATIENT)
Dept: PHYSICAL THERAPY | Facility: CLINIC | Age: 62
DRG: 004 | End: 2017-10-30
Attending: PSYCHIATRY & NEUROLOGY
Payer: COMMERCIAL

## 2017-10-30 LAB
ANION GAP SERPL CALCULATED.3IONS-SCNC: 6 MMOL/L (ref 3–14)
BUN SERPL-MCNC: 28 MG/DL (ref 7–30)
CALCIUM SERPL-MCNC: 7.8 MG/DL (ref 8.5–10.1)
CHLORIDE SERPL-SCNC: 112 MMOL/L (ref 94–109)
CO2 SERPL-SCNC: 28 MMOL/L (ref 20–32)
CREAT SERPL-MCNC: 1.33 MG/DL (ref 0.66–1.25)
ERYTHROCYTE [DISTWIDTH] IN BLOOD BY AUTOMATED COUNT: 20.6 % (ref 10–15)
GFR SERPL CREATININE-BSD FRML MDRD: 54 ML/MIN/1.7M2
GLUCOSE BLDC GLUCOMTR-MCNC: 112 MG/DL (ref 70–99)
GLUCOSE BLDC GLUCOMTR-MCNC: 115 MG/DL (ref 70–99)
GLUCOSE BLDC GLUCOMTR-MCNC: 140 MG/DL (ref 70–99)
GLUCOSE BLDC GLUCOMTR-MCNC: 141 MG/DL (ref 70–99)
GLUCOSE BLDC GLUCOMTR-MCNC: 147 MG/DL (ref 70–99)
GLUCOSE BLDC GLUCOMTR-MCNC: 148 MG/DL (ref 70–99)
GLUCOSE BLDC GLUCOMTR-MCNC: 150 MG/DL (ref 70–99)
GLUCOSE BLDC GLUCOMTR-MCNC: 155 MG/DL (ref 70–99)
GLUCOSE BLDC GLUCOMTR-MCNC: 90 MG/DL (ref 70–99)
GLUCOSE BLDC GLUCOMTR-MCNC: 95 MG/DL (ref 70–99)
GLUCOSE SERPL-MCNC: 164 MG/DL (ref 70–99)
HCT VFR BLD AUTO: 26.6 % (ref 40–53)
HGB BLD-MCNC: 8.1 G/DL (ref 13.3–17.7)
INR PPP: 1.07 (ref 0.86–1.14)
MCH RBC QN AUTO: 30.6 PG (ref 26.5–33)
MCHC RBC AUTO-ENTMCNC: 30.5 G/DL (ref 31.5–36.5)
MCV RBC AUTO: 100 FL (ref 78–100)
PLATELET # BLD AUTO: 247 10E9/L (ref 150–450)
POTASSIUM SERPL-SCNC: 4 MMOL/L (ref 3.4–5.3)
PREALB SERPL IA-MCNC: 22 MG/DL (ref 15–45)
RBC # BLD AUTO: 2.65 10E12/L (ref 4.4–5.9)
SODIUM SERPL-SCNC: 146 MMOL/L (ref 133–144)
WBC # BLD AUTO: 6.5 10E9/L (ref 4–11)

## 2017-10-30 PROCEDURE — 40000014 ZZH STATISTIC ARTERIAL MONITORING DAILY

## 2017-10-30 PROCEDURE — 27210913 ZZH NUTRITION PRODUCT INTERMEDIATE PACKET

## 2017-10-30 PROCEDURE — S0032 INJECTION, NAFCILLIN SODIUM: HCPCS | Performed by: THORACIC SURGERY (CARDIOTHORACIC VASCULAR SURGERY)

## 2017-10-30 PROCEDURE — 85027 COMPLETE CBC AUTOMATED: CPT | Performed by: ORTHOPAEDIC SURGERY

## 2017-10-30 PROCEDURE — 25000132 ZZH RX MED GY IP 250 OP 250 PS 637: Performed by: STUDENT IN AN ORGANIZED HEALTH CARE EDUCATION/TRAINING PROGRAM

## 2017-10-30 PROCEDURE — 25000128 H RX IP 250 OP 636: Performed by: ANESTHESIOLOGY

## 2017-10-30 PROCEDURE — 25000132 ZZH RX MED GY IP 250 OP 250 PS 637: Performed by: ANESTHESIOLOGY

## 2017-10-30 PROCEDURE — 00000146 ZZHCL STATISTIC GLUCOSE BY METER IP

## 2017-10-30 PROCEDURE — 25000128 H RX IP 250 OP 636: Performed by: SURGERY

## 2017-10-30 PROCEDURE — 25000132 ZZH RX MED GY IP 250 OP 250 PS 637: Performed by: THORACIC SURGERY (CARDIOTHORACIC VASCULAR SURGERY)

## 2017-10-30 PROCEDURE — 84134 ASSAY OF PREALBUMIN: CPT | Performed by: ORTHOPAEDIC SURGERY

## 2017-10-30 PROCEDURE — 40000193 ZZH STATISTIC PT WARD VISIT: Performed by: REHABILITATION PRACTITIONER

## 2017-10-30 PROCEDURE — 85610 PROTHROMBIN TIME: CPT | Performed by: ORTHOPAEDIC SURGERY

## 2017-10-30 PROCEDURE — 80048 BASIC METABOLIC PNL TOTAL CA: CPT | Performed by: ORTHOPAEDIC SURGERY

## 2017-10-30 PROCEDURE — 25000128 H RX IP 250 OP 636: Performed by: THORACIC SURGERY (CARDIOTHORACIC VASCULAR SURGERY)

## 2017-10-30 PROCEDURE — 25000128 H RX IP 250 OP 636: Performed by: STUDENT IN AN ORGANIZED HEALTH CARE EDUCATION/TRAINING PROGRAM

## 2017-10-30 PROCEDURE — 25000125 ZZHC RX 250: Performed by: THORACIC SURGERY (CARDIOTHORACIC VASCULAR SURGERY)

## 2017-10-30 PROCEDURE — 43246 EGD PLACE GASTROSTOMY TUBE: CPT | Performed by: SURGERY

## 2017-10-30 PROCEDURE — 94003 VENT MGMT INPAT SUBQ DAY: CPT

## 2017-10-30 PROCEDURE — 40000275 ZZH STATISTIC RCP TIME EA 10 MIN

## 2017-10-30 PROCEDURE — 20000004 ZZH R&B ICU UMMC

## 2017-10-30 PROCEDURE — 27210429 ZZH NUTRITION PRODUCT INTERMEDIATE LITER

## 2017-10-30 PROCEDURE — 25000125 ZZHC RX 250: Performed by: STUDENT IN AN ORGANIZED HEALTH CARE EDUCATION/TRAINING PROGRAM

## 2017-10-30 PROCEDURE — 99291 CRITICAL CARE FIRST HOUR: CPT | Mod: GC | Performed by: SURGERY

## 2017-10-30 PROCEDURE — 93010 ELECTROCARDIOGRAM REPORT: CPT | Performed by: INTERNAL MEDICINE

## 2017-10-30 PROCEDURE — 97110 THERAPEUTIC EXERCISES: CPT | Mod: GP | Performed by: REHABILITATION PRACTITIONER

## 2017-10-30 PROCEDURE — 97530 THERAPEUTIC ACTIVITIES: CPT | Mod: GP | Performed by: REHABILITATION PRACTITIONER

## 2017-10-30 PROCEDURE — 93005 ELECTROCARDIOGRAM TRACING: CPT

## 2017-10-30 PROCEDURE — 71010 XR CHEST PORT 1 VW: CPT

## 2017-10-30 PROCEDURE — 0DH63UZ INSERTION OF FEEDING DEVICE INTO STOMACH, PERCUTANEOUS APPROACH: ICD-10-PCS | Performed by: SURGERY

## 2017-10-30 PROCEDURE — 25000132 ZZH RX MED GY IP 250 OP 250 PS 637: Performed by: SURGERY

## 2017-10-30 RX ORDER — QUETIAPINE FUMARATE 50 MG/1
50 TABLET, FILM COATED ORAL 3 TIMES DAILY
Status: DISCONTINUED | OUTPATIENT
Start: 2017-10-30 | End: 2017-10-31

## 2017-10-30 RX ORDER — SODIUM CHLORIDE 9 MG/ML
INJECTION, SOLUTION INTRAVENOUS
Status: DISCONTINUED
Start: 2017-10-30 | End: 2017-10-30 | Stop reason: HOSPADM

## 2017-10-30 RX ORDER — FENTANYL CITRATE 50 UG/ML
INJECTION, SOLUTION INTRAMUSCULAR; INTRAVENOUS
Status: DISCONTINUED
Start: 2017-10-30 | End: 2017-10-30 | Stop reason: HOSPADM

## 2017-10-30 RX ORDER — FENTANYL CITRATE 50 UG/ML
25-50 INJECTION, SOLUTION INTRAMUSCULAR; INTRAVENOUS
Status: DISCONTINUED | OUTPATIENT
Start: 2017-10-30 | End: 2017-11-01

## 2017-10-30 RX ORDER — FENTANYL CITRATE 50 UG/ML
50-300 INJECTION, SOLUTION INTRAMUSCULAR; INTRAVENOUS
Status: COMPLETED | OUTPATIENT
Start: 2017-10-30 | End: 2017-10-30

## 2017-10-30 RX ADMIN — DEXMEDETOMIDINE HYDROCHLORIDE 0.7 MCG/KG/HR: 4 INJECTION, SOLUTION INTRAVENOUS at 15:04

## 2017-10-30 RX ADMIN — Medication 300 MG: at 19:57

## 2017-10-30 RX ADMIN — NYSTATIN 500000 UNITS: 100000 SUSPENSION ORAL at 04:12

## 2017-10-30 RX ADMIN — FENTANYL CITRATE 150 MCG: 50 INJECTION INTRAMUSCULAR; INTRAVENOUS at 14:50

## 2017-10-30 RX ADMIN — NAFCILLIN: 10 INJECTION, POWDER, FOR SOLUTION INTRAVENOUS at 09:28

## 2017-10-30 RX ADMIN — ASPIRIN 81 MG CHEWABLE TABLET 81 MG: 81 TABLET CHEWABLE at 08:26

## 2017-10-30 RX ADMIN — QUETIAPINE FUMARATE 25 MG: 25 TABLET ORAL at 08:26

## 2017-10-30 RX ADMIN — MIDAZOLAM 6 MG: 1 INJECTION INTRAMUSCULAR; INTRAVENOUS at 14:49

## 2017-10-30 RX ADMIN — Medication 5 MG: at 05:28

## 2017-10-30 RX ADMIN — DEXMEDETOMIDINE HYDROCHLORIDE 0.7 MCG/KG/HR: 4 INJECTION, SOLUTION INTRAVENOUS at 22:44

## 2017-10-30 RX ADMIN — Medication 300 MG: at 08:27

## 2017-10-30 RX ADMIN — QUETIAPINE FUMARATE 50 MG: 50 TABLET, FILM COATED ORAL at 19:56

## 2017-10-30 RX ADMIN — METOPROLOL TARTRATE 12.5 MG: 25 TABLET, FILM COATED ORAL at 08:25

## 2017-10-30 RX ADMIN — NYSTATIN 500000 UNITS: 100000 SUSPENSION ORAL at 20:17

## 2017-10-30 RX ADMIN — HEPARIN SODIUM 5000 UNITS: 5000 INJECTION, SOLUTION INTRAVENOUS; SUBCUTANEOUS at 04:12

## 2017-10-30 RX ADMIN — HEPARIN SODIUM 5000 UNITS: 5000 INJECTION, SOLUTION INTRAVENOUS; SUBCUTANEOUS at 21:08

## 2017-10-30 RX ADMIN — INSULIN ASPART 1 UNITS: 100 INJECTION, SOLUTION INTRAVENOUS; SUBCUTANEOUS at 08:31

## 2017-10-30 RX ADMIN — Medication 5 MG: at 05:39

## 2017-10-30 RX ADMIN — FENTANYL CITRATE 25 MCG: 50 INJECTION INTRAMUSCULAR; INTRAVENOUS at 11:00

## 2017-10-30 RX ADMIN — FAMOTIDINE 20 MG: 40 POWDER, FOR SUSPENSION ORAL at 08:26

## 2017-10-30 RX ADMIN — LEVOFLOXACIN 750 MG: 5 INJECTION, SOLUTION INTRAVENOUS at 11:36

## 2017-10-30 RX ADMIN — MULTIVITAMIN 15 ML: LIQUID ORAL at 08:26

## 2017-10-30 RX ADMIN — DEXMEDETOMIDINE HYDROCHLORIDE 0.2 MCG/KG/HR: 4 INJECTION, SOLUTION INTRAVENOUS at 06:35

## 2017-10-30 RX ADMIN — NAFCILLIN: 10 INJECTION, POWDER, FOR SOLUTION INTRAVENOUS at 13:33

## 2017-10-30 RX ADMIN — NAFCILLIN: 10 INJECTION, POWDER, FOR SOLUTION INTRAVENOUS at 16:20

## 2017-10-30 RX ADMIN — Medication 1 PACKET: at 08:28

## 2017-10-30 RX ADMIN — FENTANYL CITRATE 25 MCG: 50 INJECTION INTRAMUSCULAR; INTRAVENOUS at 06:04

## 2017-10-30 RX ADMIN — ACETAMINOPHEN 975 MG: 325 SOLUTION ORAL at 08:25

## 2017-10-30 RX ADMIN — Medication 10 MG: at 19:17

## 2017-10-30 RX ADMIN — Medication: at 08:26

## 2017-10-30 RX ADMIN — FENTANYL CITRATE 25 MCG: 50 INJECTION INTRAMUSCULAR; INTRAVENOUS at 11:30

## 2017-10-30 RX ADMIN — NAFCILLIN: 10 INJECTION, POWDER, FOR SOLUTION INTRAVENOUS at 20:18

## 2017-10-30 RX ADMIN — METOPROLOL TARTRATE 12.5 MG: 25 TABLET, FILM COATED ORAL at 19:56

## 2017-10-30 RX ADMIN — NAFCILLIN: 10 INJECTION, POWDER, FOR SOLUTION INTRAVENOUS at 04:32

## 2017-10-30 RX ADMIN — FENTANYL CITRATE 25 MCG: 50 INJECTION INTRAMUSCULAR; INTRAVENOUS at 21:08

## 2017-10-30 RX ADMIN — NYSTATIN 500000 UNITS: 100000 SUSPENSION ORAL at 08:26

## 2017-10-30 RX ADMIN — FENTANYL CITRATE 25 MCG: 50 INJECTION INTRAMUSCULAR; INTRAVENOUS at 07:38

## 2017-10-30 RX ADMIN — INSULIN ASPART 1 UNITS: 100 INJECTION, SOLUTION INTRAVENOUS; SUBCUTANEOUS at 04:29

## 2017-10-30 RX ADMIN — Medication: at 20:16

## 2017-10-30 ASSESSMENT — VISUAL ACUITY: OU: GLASSES

## 2017-10-30 NOTE — PROGRESS NOTES
CLINICAL NUTRITION SERVICES - Brief note     Pt did not receive PEG on 10/27 - to receive today.     Interventions  Adjusted TF orders to specify advancement schedule (start at 20 mL/hr and adv 10 mL q4h to goal) to ensure tolerance of new gastric access.       RD will continue to follow.    Maricel Childers RD, LD, [facility]  CVICU Dietitian  Pager: [phone]

## 2017-10-30 NOTE — OR NURSING
PEG tube placed in ICU with ICU RN present throughout. ICU RN responsible for sedation and vitals.

## 2017-10-30 NOTE — PROGRESS NOTES
CVTS Daily Note  10/30/2017  Attending: Ramesh Kuo, *    S:   No overnight events. Mental status unchanged.    O:   Vitals:    10/30/17 1030 10/30/17 1100 10/30/17 1200 10/30/17 1300   BP:       BP Location:       Pulse:       Resp:  20 12 13   Temp:   98.2  F (36.8  C)    TempSrc:   Axillary    SpO2: 98% 99% 100% 100%   Weight:       Height:         Vitals:    10/28/17 0400 10/29/17 0400 10/30/17 0400   Weight: 91.8 kg (202 lb 6.1 oz) 93.2 kg (205 lb 7.5 oz) 91.4 kg (201 lb 8 oz)     -1 kg since admit      Intake/Output Summary (Last 24 hours) at 10/30/17 1358  Last data filed at 10/30/17 0900   Gross per 24 hour   Intake          1907.59 ml   Output             1125 ml   Net           782.59 ml     General:  lightly sedated, does not follow commands but tracking and trying to mouth words.opens eyes spontaneously.   Neck: Supple, trach in place, mild left jaw swelling  Cardiovascular: RRR  Pumonary: Non labored breathing on MV  Abdomen: Soft, non distended, non tender  Ext: Minimal edema, warm  Neuro: Does not follow commands      Labs:  BMP  Recent Labs  Lab 10/30/17  0423 10/29/17  0340 10/28/17  0336 10/27/17  1449 10/27/17  0430   * 145* 143  --  142   POTASSIUM 4.0 3.7 3.5 4.1 3.7   CHLORIDE 112* 111* 110*  --  109   CO2 28 27 25  --  27   BUN 28 29 28  --  30   CR 1.33* 1.29* 1.44*  --  1.46*   * 164* 165*  --  164*   MAG  --  2.2 2.2 2.1 2.2   PHOS  --  3.6 3.3 4.3 3.4     CBC  Recent Labs  Lab 10/30/17  0423 10/29/17  0340 10/28/17  0336 10/27/17  0430   WBC 6.5 7.4 8.7 6.8   HGB 8.1* 8.6* 9.0* 8.7*    281 295 309     INR/PTT  Recent Labs  Lab 10/30/17  0423   INR 1.07     ABG  Recent Labs  Lab 10/26/17  0358 10/25/17  0346 10/24/17  0405   PH 7.40 7.41 7.42   PCO2 43 41 40   PO2 67* 105 98   HCO3 27 26 26     LFT  Recent Labs  Lab 10/24/17  0405   AST 46*   ALT 42       GLUCOSE:     Recent Labs  Lab 10/30/17  1201 10/30/17  0831 10/30/17  0423 10/30/17  0417 10/29/17  2342  10/29/17  2035 10/29/17  1650  10/29/17  0340  10/28/17  0336  10/27/17  0430  10/26/17  1513  10/26/17  0358   GLC  --   --  164*  --   --   --   --   --  164*  --  165*  --  164*  --  130*  --  135*   BGM 95 147*  --  155* 136* 150* 115*  < >  --   < >  --   < >  --   < > 129*  < >  --    < > = values in this interval not displayed.      A/P:    Cricket Miguel is a 62 year old with history of AVR and ascending aortic repair in May 2016 presented on 10/6 with acute mental status changes found to have large moises-aortic fluid collection and multiple strokes likely septic emboli.    Neuro: Tylenol scheduled for fevers,continue neuro checks Q2H,  precedex and wake up as able. Okay to use ibuprofen for fevers if needed per neuro. MRI head and spine with interval changes as expected, no new findings.   Resp: Inability to protect airway requiring mechanical ventilation, on minimal vent settings, did well with pressure support. Trach placed, PS then trach dome as tolerated  CV: Goal normotension per neurology/neurosurgery. Timing of surgery tbd. ASA. Low dose metoprolol BID for intermittent SVT.  GI/FEN: Tube feeds to goal, continue free water for hypernatremia (goal 145-155, go slow per neuro), PEG today  Renal: JOSE non-oliguric.  Endo: SSI  ID: Continue current antibiotic regimen pending ID recommendations (Nafcillin, levofloxacin, gentamycin, rifampin), daily blood cultures negative since 10/13. Added rifampin to abx per ID. Stopped daily cultures since they have been negative. WBC slowly decreasing. PET scan done showed increased uptake around the heart and knee. OMFS consulted for tooth infection as per Dentistry. CT neck shows no abscess, cellulitis.   Heme: No bleeding concerns at this time, hold anticoagulation per neuro and neurosurgery  Prophylaxis: H2 blocker, heparin SC      Dispo: CVICU         Discussed with CVTS Fellow   Staff surgeons have been informed of changes through both  verbal and written  communication.        Walter Bullock   Surgery PGY3  [phone]

## 2017-10-30 NOTE — PLAN OF CARE
Problem: Patient Care Overview  Goal: Plan of Care/Patient Progress Review  D/I: Pt put on trach dome 40%, sats %. BP labile, fentanyl given with improvement to B/P's.   Precedex between 0- 0.7 mcg/kg/mn for agitation. Pt moving left arm against gravity. Moves left leg slightly and right hand index finger spontaneously. Nothing to commands. Peg tube placed w/o issue.   A: Pt blood pressure still labile. Neuro status, not following commands.   P: Continue with IV antibiotics. Trach dome as tolerated. Update MD with concerns.      Jane Adams, RN

## 2017-10-30 NOTE — PROCEDURES
SURGICAL INTENSIVE CARE UNIT PROCEDURE NOTE      DATE OF PROCEDURE: 10/30/2017       PREOPERATIVE DIAGNOSIS: Access for enteral nutrition       POSTOPERATIVE DIAGNOSIS: Same as above      PROCEDURE Percutaneous endoscopic gastrostomy       SURGEON:. Jorge Luis Mckay MD      ASSISTANT:     Elder Joyce MD - Fellow  Malorie Byrne MD- Resident     Anesthesia: Total of 6 mg of Versed and 150 mcg of Fentanyl over 15 mins       ESTIMATED BLOOD LOSS: 5cc          INDICATIONS FOR PROCEDURE: Cricket Miguel is a 62 year old male with significant history of endocarditis/aortic fluid collection s/p Bentall procedure in 2016, whose hospital course has been complicated by respiratory failure and malnutrition, now requires a gastrostomy tube. We discussed the risks and benefits of proceeding with this procedure with the family. The patient's family  had all of their questions answered and consented to the procedure.       DESCRIPTION OF PROCEDURE    After informed consent was obtained, the patient was sedated, placed in supine position, and prepped in the usual sterile fashion. EGD was performed and the stomach was insufflated. The PEG site was chosen and anesthetized with 1% lidocaine. A 0.5cm skin incision was made, and needle was introduced under endoscopic visualization into the stomach. Guidewire was introduced through the needle using Seldinger technique and was grasped by the endoscope. The guidewire was brought out through the stomach and PEG tube was looped onto the guidewire. The PEG was brought into the stomach and out through the skin under endoscopic visualization. The PEG tubing was noted to be at the 2.5 margie at the skin. The inner flange was noted to be flush with the stomach wall by endoscope. The outer flange was then secured to the PEG. The patient tolerated the procedure well.    Dr. Mckay was present for the entirety of the procedure.    PLAN    - keep G  tube to gravity drainage for 24 hours  - Ok to use G tube for meds      Malorie Byrne MD  PGY-2 General Surgery Resident   [phone]

## 2017-10-30 NOTE — PROGRESS NOTES
CV ICU Progress Note    POD: 3 Days Post-Op  LOS: 24    Admission History: Mr. Cricket Miguel is a 62 year old gentleman w/ h/o AVR and ascending aortic repair in 5/2016 admitted with acute mental status changes found to have large moises-aortic fluid collection and multiple strokes likely septic emboli.  MRI findings showed left posterior MCA, left PICA, and small right PCA infarcts.    S/Interval History:   No acute events overnight.  Possible PEG later today.    O:    Temp: 98.7  F (37.1  C) Temp  Min: 98.3  F (36.8  C)  Max: 99  F (37.2  C)  Resp: 14 Resp  Min: 12  Max: 26  SpO2: 98 % SpO2  Min: 96 %  Max: 100 %    No Data Recorded  Heart Rate: 73 Heart Rate  Min: 64  Max: 108   No data recorded.  No data recorded.    Ventilation Mode: Trach collar  FiO2 (%): 40 %  Rate Set (breaths/minute): 12 breaths/min  Tidal Volume Set (mL): 500 mL  PEEP (cm H2O): 7 cmH2O  Pressure Support (cm H2O): 7 cmH2O  Oxygen Concentration (%): 40 %  Resp: 14  Date 10/16/17 0700 - 10/17/17 0659   Shift 6985-8314 1290-8983 9599-2904 24 Hour Total   I  N  T  A  K  E   I.V. 114.6   114.6    NG/   400    Enteral 240   240    Shift Total  (mL/kg) 754.6  (8.34)   754.6  (8.34)   O  U  T  P  U  T   Urine 575   575    Shift Total  (mL/kg) 575  (6.35)   575  (6.35)   Weight (kg) 90.5 90.5 90.5 90.5         Past Medical History:   Diagnosis Date     AI (aortic insufficiency)      Aortic root dilatation (H)      AR (aortic regurgitation)     severe     Cardiomyopathy (H)      CHF (congestive heart failure), NYHA class II (H)      COPD, mild (H)     spirometry 12/16     Heart murmur      Hyperlipidemia LDL goal <70 10/31/2010     Hypertension goal BP (blood pressure) < 140/90      Inguinal hernia      left lung nodule  1/29/2008     Major depressive disorder, single episode, moderate (H)      Psoriasis childhood     Tobacco use disorder        Past Surgical History:   Procedure Laterality Date     ARTHROSCOPY KNEE INCISION AND DRAINAGE Left  10/8/2017    Procedure: ARTHROSCOPY KNEE INCISION AND DRAINAGE;  Arthroscopic Incision and Drainage of Left Knee;  Surgeon: Lucretia Munoz MD;  Location: [facility] OR     HC SHOULDER ARTHROSCOPY, DX  2001    Arthroscopy, Shoulder RT Dr OSIRIS Andersen     HC SHOULDER ARTHROSCOPY, DX  1/04    LT arthroscopy Dr OSIRIS Andersen     HERNIA REPAIR, UMBILICAL  1/08    incarcerated - dr rockwell     HERNIORRHAPHY INGUINAL  12/21/2012    HERNIORRHAPHY INGUINAL;  Right Inguinal Hernia Repair with mesh ;  Surgeon: Mello Rockwell MD;  Location: [facility] OR     REPLACE VALVE AORTIC N/A 5/17/2016    REPLACE VALVE AORTIC - Dr Bowers     ROTATOR CUFF REPAIR RT/LT  11/10    Rt arthroscopy - Dr OSIRIS Andersen     SURGICAL HISTORY OF -   5/16    AVR, severe AR, aortic root repair     TRACHEOSTOMY PERCUTANEOUS  10/27/2017            Physical Exam    General: Trached, lightly sedated, in no distress   Neck: Supple, no tracheal deviation  Cardiovascular: Regular tachy, RRR  Pumonary: Non labored breathing w/ PS.  CTAB   Abdomen: Soft, non distended, non tender.   Ext: Upper and lower extremity mild edema  Neuro: Small sluggishly reactive pupil bilaterally approximately 2mm - Left oculocephalic & LT Corneal abscent with mild intermittent nystagmus to the RT side.  Tracks.  Opens eyes to voice.  Nods appropriately to questions periodically.  Unchanged.    Lab Results   Component Value Date    WBC 6.5 10/30/2017    HGB 8.1 (L) 10/30/2017    HCT 26.6 (L) 10/30/2017     10/30/2017     (H) 10/30/2017    POTASSIUM 4.0 10/30/2017    CHLORIDE 112 (H) 10/30/2017    CO2 28 10/30/2017    BUN 28 10/30/2017    CR 1.33 (H) 10/30/2017     (H) 10/30/2017     (H) 10/23/2017    DD 1.7 (H) 04/25/2016    NTBNPI 2768 (H) 04/25/2016    NTBNP 1302 (H) 06/03/2016    TROPONIN <0.07 12/05/2005    TROPI 0.634 (HH) 10/08/2017    AST 46 (H) 10/24/2017    ALT 42 10/24/2017    ALKPHOS 85 10/06/2017    BILITOTAL 1.5 (H) 10/06/2017    INR 1.07 10/30/2017            dexmedetomidine 0.4 mcg/kg/hr (10/30/17 0847)     IV fluid REPLACEMENT ONLY           Assessment and Plan: Mr. Cricket Miguel is a 62 year old gentleman w/ h/o AVR and ascending aortic repair in 5/2016 admitted with acute mental status changes found to have large moises-aortic fluid collection and multiple strokes likely septic emboli.  Concern for moises-infarct edema leading to expansion of the cerebellar infarct.    Neuro:     Multifocal CVA: L cerebellar, L MCA, and R occipital regions.  Concern for septic emboli.  Small microhemorrhages were observed.  Progressive edema noted.  Neurosurgery and neurocritical care are following.  Angio showed no evidence of mycotic aneurysms.  Neuro exam stable.    Neurocrit team comments:  From his stroke, expected deficit include some language difficulty, right and left confusion, some trouble with calculation, possible visual field cut, and coordination with gait imbalance. Motor weakness from the stroke should include right hemiparesis/hemiplegia. 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 Neurological prognosis is good with very slow recovery, which will take months.    Physical exam showing mild improvement.  Patient opens eyes and periodically nods appropriately to questions.      ASA 81mg    Heparin 5000 U sq q8 hours.  Held for PEG.  Restart after.    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  1st degree AVB.     Tagged WBC scan showed likely infectious etiology.    Surgical date will be scheduled after patient has shown stabilization and recovery from neurologic insult.    BP lability    Metoprolol     Increasing Seroquel to  50 mg tid.    Tachycardia    Metoprolol started 10/19  GI/FEN:     Post pyloric feeding tube in place, continue at goal    PEG on Monday, Heparin & TF on hold.   Renal:     Monitor electrolytes, Cr, and urine output     Lytes goals K>4 and Mag>2    JOSE:     Monitor Urine Output. Cr      Volume up today.  Possible diuresis this afternoon after PEG/Trach.    Hypernatremia, resolved    FWF decreased to 30 q4    Goal Na+ 145-155 per [facility]  Endo:    Glucose checks, SSI to serum glucose >80, <180 mg/dL  ID:     Sepsis: MSSA bacteremia with likely source of the L knee septic arthritis. Knee is s/p arthroscopic irrigation and debridement 10/8. Renal function has recovered.  Otherwise hemodynamically stable without pressors.      ID consultant recommended:    Continue Nafcillin & Rifampin a minimum of 3 months and even longer if pt hasn't had surgery by that point (i.e. Continue through surgery)    Continue Levofloxacin for 4 weeks from start of therapy (10/07-11/04)    Stop Gentamicin    F/U in ID clinic if discharged to LTAC     Nystatin (oral candidiasis)     PET scan 10/19 revealed likely infectious fluid around ascending aorta    Tooth Remnant, left lower canine    OMF o/b, rec continuing abx and outpatient f/u    Monitor WBC daily   Heme:     Hgb:  stable  Prophylaxis: SCDs, Famotidine, ASA  Lines:      CVC 24 days    Espinoza 24 days    Trach 3 days      Dispo: Continue ICU care.  Discussing LTAC with social work.    Code Status: Full Code    Jeramie Orellana DO  Anesthesiology Resident  CVICU Service, *[id number]    Patient seen and staffed with attending.

## 2017-10-30 NOTE — PLAN OF CARE
Problem: Patient Care Overview  Goal: Plan of Care/Patient Progress Review  Discharge Planner PT   Patient plan for discharge: Unable to state  Current status: Pt intubated via trach on CPAP with 40% FiO2, PEEP of 7. PT performed PROM all joints all planes. Pt performs bed mobility with Dep A.  Barriers to return to prior living situation: Pt is well below baseline level of function.  Recommendations for discharge: LTACH  Rationale for recommendations: Pt currently participating PROM for all joints and planes BUE and BLE. Pt is well below baseline level of function.       Entered by: Izabela Weber 10/30/2017 10:23 AM

## 2017-10-30 NOTE — PROGRESS NOTES
Care Coordinator- Discharge Planning     Admission Date/Time:  10/6/2017  Attending MD:  Ramesh Kuo, *     Data  Chart reviewed, discussed with interdisciplinary team.   Patient was admitted for:   1. S/P AVR (aortic valve replacement)    2. Dissection of aorta, unspecified portion of aorta (H)    3. Sepsis due to other etiology (H)    4. NSTEMI (non-ST elevated myocardial infarction) (H)    5. Hypotension, unspecified hypotension type    6. 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)         Assessment  Full assessment completed in previous note    Pt had trach placement done on 10/27 and plan to have PEG placement done today. Pt will need LTAC placement once medically stable.     Coordination of Care and Referrals: Provided patient/family with options for LTACH.  CC discussed with pt dtr, son and SO last week about LTAC and all the family have agreed to have referral send for both [facility] and [facility].  Referral have been sent for both facilities.      Plan  Anticipated Discharge Date:  TBD  Anticipated Discharge Plan:   LTAC.  [facility] and [facility] are assessing pt for LTAC placement.  CC will con. to follow plan of care.      Matthew Keller RN, PHN, BSN  4A and 4E/ ICU  Care Coordinator  Phone: [phone]  Pager: [phone]

## 2017-10-30 NOTE — PLAN OF CARE
Problem: Patient Care Overview  Goal: Plan of Care/Patient Progress Review  Outcome: No Change  D/I: patient remains in the Surgical/Neuro ICU. Per MD note: admitted with acute mental status changes found to have large moises-aortic fluid collection and multiple strokes likely septic emboli.  MRI findings showed left posterior MCA, left PICA, and small right PCA infarcts.  Neuro- intermittently withdraws slightly to pain with RUE and RLE. Right pupil is slightly larger than left. Occasionally some spontaneous movement of Left extremities has been noted. Follows command to stick out tongue but does not follow any other commands. Left facial droop. Tracks to voice but not to command.   CV- sinus rhythm HR 70s-90s. Labile BP, as high as 200/100 and as low as 90/60s 10mg total of Labetalol given around 0530 to maintain SBP <170 -no reduction in BP noted; MD notifed 25mcg Fentanyl given with reduction in BP.  Pulm- lungs coarse, vented via trach. Suctioning small amounts of cloudy sputum from trach and orally. switched to Pressure support settings on vent over night (from trach dome).   GI- Tube feeds at goal of 50cc/hr via NJ.   - incontinent of urine. Condom catheter placed to protect skin  Gtts- Precedex @ 0.7mcg/kg/hr most of the night, reduced to 0.2mcg around 0600 to maintain MAP >65  Labs- Kcl replaced with 20mEq per protocol.    Social- SO at bedside until around 2200 and returned around 0630  See flow sheets for further details and assessments.  A: stable  P: continue to monitor, notify MD of significant changes.

## 2017-10-31 ENCOUNTER — APPOINTMENT (OUTPATIENT)
Dept: PHYSICAL THERAPY | Facility: CLINIC | Age: 62
DRG: 004 | End: 2017-10-31
Attending: PSYCHIATRY & NEUROLOGY
Payer: COMMERCIAL

## 2017-10-31 LAB
ANION GAP SERPL CALCULATED.3IONS-SCNC: 7 MMOL/L (ref 3–14)
BUN SERPL-MCNC: 29 MG/DL (ref 7–30)
CALCIUM SERPL-MCNC: 8.4 MG/DL (ref 8.5–10.1)
CHLORIDE SERPL-SCNC: 107 MMOL/L (ref 94–109)
CO2 SERPL-SCNC: 30 MMOL/L (ref 20–32)
CREAT SERPL-MCNC: 1.41 MG/DL (ref 0.66–1.25)
ERYTHROCYTE [DISTWIDTH] IN BLOOD BY AUTOMATED COUNT: 20.8 % (ref 10–15)
GFR SERPL CREATININE-BSD FRML MDRD: 51 ML/MIN/1.7M2
GLUCOSE BLDC GLUCOMTR-MCNC: 101 MG/DL (ref 70–99)
GLUCOSE BLDC GLUCOMTR-MCNC: 114 MG/DL (ref 70–99)
GLUCOSE BLDC GLUCOMTR-MCNC: 119 MG/DL (ref 70–99)
GLUCOSE BLDC GLUCOMTR-MCNC: 126 MG/DL (ref 70–99)
GLUCOSE BLDC GLUCOMTR-MCNC: 162 MG/DL (ref 70–99)
GLUCOSE BLDC GLUCOMTR-MCNC: 162 MG/DL (ref 70–99)
GLUCOSE BLDC GLUCOMTR-MCNC: 98 MG/DL (ref 70–99)
GLUCOSE SERPL-MCNC: 106 MG/DL (ref 70–99)
HCT VFR BLD AUTO: 28 % (ref 40–53)
HGB BLD-MCNC: 8.4 G/DL (ref 13.3–17.7)
INTERPRETATION ECG - MUSE: NORMAL
MAGNESIUM SERPL-MCNC: 2.2 MG/DL (ref 1.6–2.3)
MAGNESIUM SERPL-MCNC: 2.2 MG/DL (ref 1.6–2.3)
MCH RBC QN AUTO: 30.3 PG (ref 26.5–33)
MCHC RBC AUTO-ENTMCNC: 30 G/DL (ref 31.5–36.5)
MCV RBC AUTO: 101 FL (ref 78–100)
PHOSPHATE SERPL-MCNC: 4.6 MG/DL (ref 2.5–4.5)
PLATELET # BLD AUTO: 229 10E9/L (ref 150–450)
POTASSIUM SERPL-SCNC: 3.6 MMOL/L (ref 3.4–5.3)
POTASSIUM SERPL-SCNC: 4.4 MMOL/L (ref 3.4–5.3)
RBC # BLD AUTO: 2.77 10E12/L (ref 4.4–5.9)
SODIUM SERPL-SCNC: 144 MMOL/L (ref 133–144)
WBC # BLD AUTO: 6.5 10E9/L (ref 4–11)

## 2017-10-31 PROCEDURE — 25000125 ZZHC RX 250: Performed by: THORACIC SURGERY (CARDIOTHORACIC VASCULAR SURGERY)

## 2017-10-31 PROCEDURE — 25000128 H RX IP 250 OP 636: Performed by: ANESTHESIOLOGY

## 2017-10-31 PROCEDURE — 83735 ASSAY OF MAGNESIUM: CPT | Performed by: ORTHOPAEDIC SURGERY

## 2017-10-31 PROCEDURE — S0032 INJECTION, NAFCILLIN SODIUM: HCPCS | Performed by: THORACIC SURGERY (CARDIOTHORACIC VASCULAR SURGERY)

## 2017-10-31 PROCEDURE — 25000132 ZZH RX MED GY IP 250 OP 250 PS 637: Performed by: THORACIC SURGERY (CARDIOTHORACIC VASCULAR SURGERY)

## 2017-10-31 PROCEDURE — 25000132 ZZH RX MED GY IP 250 OP 250 PS 637: Performed by: ANESTHESIOLOGY

## 2017-10-31 PROCEDURE — 40000193 ZZH STATISTIC PT WARD VISIT: Performed by: REHABILITATION PRACTITIONER

## 2017-10-31 PROCEDURE — 25000132 ZZH RX MED GY IP 250 OP 250 PS 637: Performed by: STUDENT IN AN ORGANIZED HEALTH CARE EDUCATION/TRAINING PROGRAM

## 2017-10-31 PROCEDURE — 20000004 ZZH R&B ICU UMMC

## 2017-10-31 PROCEDURE — 99291 CRITICAL CARE FIRST HOUR: CPT | Mod: GC | Performed by: SURGERY

## 2017-10-31 PROCEDURE — 00000146 ZZHCL STATISTIC GLUCOSE BY METER IP

## 2017-10-31 PROCEDURE — 25000128 H RX IP 250 OP 636: Performed by: THORACIC SURGERY (CARDIOTHORACIC VASCULAR SURGERY)

## 2017-10-31 PROCEDURE — 27210429 ZZH NUTRITION PRODUCT INTERMEDIATE LITER

## 2017-10-31 PROCEDURE — 25000128 H RX IP 250 OP 636: Performed by: NEUROLOGICAL SURGERY

## 2017-10-31 PROCEDURE — 40000275 ZZH STATISTIC RCP TIME EA 10 MIN

## 2017-10-31 PROCEDURE — 25000125 ZZHC RX 250: Performed by: STUDENT IN AN ORGANIZED HEALTH CARE EDUCATION/TRAINING PROGRAM

## 2017-10-31 PROCEDURE — 80048 BASIC METABOLIC PNL TOTAL CA: CPT | Performed by: ORTHOPAEDIC SURGERY

## 2017-10-31 PROCEDURE — 97110 THERAPEUTIC EXERCISES: CPT | Mod: GP | Performed by: REHABILITATION PRACTITIONER

## 2017-10-31 PROCEDURE — 97530 THERAPEUTIC ACTIVITIES: CPT | Mod: GP | Performed by: REHABILITATION PRACTITIONER

## 2017-10-31 PROCEDURE — 25000132 ZZH RX MED GY IP 250 OP 250 PS 637: Performed by: SURGERY

## 2017-10-31 PROCEDURE — 85027 COMPLETE CBC AUTOMATED: CPT | Performed by: ORTHOPAEDIC SURGERY

## 2017-10-31 PROCEDURE — 94003 VENT MGMT INPAT SUBQ DAY: CPT

## 2017-10-31 PROCEDURE — 84100 ASSAY OF PHOSPHORUS: CPT | Performed by: ORTHOPAEDIC SURGERY

## 2017-10-31 PROCEDURE — 25000128 H RX IP 250 OP 636: Performed by: SURGERY

## 2017-10-31 PROCEDURE — 84132 ASSAY OF SERUM POTASSIUM: CPT | Performed by: ORTHOPAEDIC SURGERY

## 2017-10-31 PROCEDURE — 27210913 ZZH NUTRITION PRODUCT INTERMEDIATE PACKET

## 2017-10-31 RX ORDER — HALOPERIDOL 5 MG/ML
5 INJECTION INTRAMUSCULAR ONCE
Status: DISCONTINUED | OUTPATIENT
Start: 2017-10-31 | End: 2017-11-04 | Stop reason: HOSPADM

## 2017-10-31 RX ORDER — GUAR GUM
1 PACKET (EA) ORAL 3 TIMES DAILY
Status: DISCONTINUED | OUTPATIENT
Start: 2017-10-31 | End: 2017-11-04 | Stop reason: HOSPADM

## 2017-10-31 RX ORDER — QUETIAPINE FUMARATE 50 MG/1
150 TABLET, FILM COATED ORAL 3 TIMES DAILY
Status: DISCONTINUED | OUTPATIENT
Start: 2017-10-31 | End: 2017-11-04 | Stop reason: HOSPADM

## 2017-10-31 RX ORDER — LABETALOL HYDROCHLORIDE 5 MG/ML
10-20 INJECTION, SOLUTION INTRAVENOUS EVERY 4 HOURS PRN
Status: DISCONTINUED | OUTPATIENT
Start: 2017-10-31 | End: 2017-11-01

## 2017-10-31 RX ORDER — HYDRALAZINE HYDROCHLORIDE 20 MG/ML
5-10 INJECTION INTRAMUSCULAR; INTRAVENOUS EVERY 4 HOURS PRN
Status: DISCONTINUED | OUTPATIENT
Start: 2017-10-31 | End: 2017-11-01

## 2017-10-31 RX ORDER — LABETALOL HYDROCHLORIDE 5 MG/ML
20 INJECTION, SOLUTION INTRAVENOUS ONCE
Status: COMPLETED | OUTPATIENT
Start: 2017-10-31 | End: 2017-10-31

## 2017-10-31 RX ORDER — QUETIAPINE FUMARATE 50 MG/1
100 TABLET, FILM COATED ORAL ONCE
Status: COMPLETED | OUTPATIENT
Start: 2017-10-31 | End: 2017-10-31

## 2017-10-31 RX ADMIN — METOPROLOL TARTRATE 12.5 MG: 25 TABLET, FILM COATED ORAL at 19:56

## 2017-10-31 RX ADMIN — HEPARIN SODIUM 5000 UNITS: 5000 INJECTION, SOLUTION INTRAVENOUS; SUBCUTANEOUS at 19:56

## 2017-10-31 RX ADMIN — NAFCILLIN: 10 INJECTION, POWDER, FOR SOLUTION INTRAVENOUS at 07:57

## 2017-10-31 RX ADMIN — NAFCILLIN: 10 INJECTION, POWDER, FOR SOLUTION INTRAVENOUS at 00:59

## 2017-10-31 RX ADMIN — Medication 5 MG: at 15:05

## 2017-10-31 RX ADMIN — Medication 1 PACKET: at 07:57

## 2017-10-31 RX ADMIN — LEVOFLOXACIN 750 MG: 5 INJECTION, SOLUTION INTRAVENOUS at 10:34

## 2017-10-31 RX ADMIN — DEXMEDETOMIDINE HYDROCHLORIDE 0.3 MCG/KG/HR: 4 INJECTION, SOLUTION INTRAVENOUS at 09:16

## 2017-10-31 RX ADMIN — NAFCILLIN: 10 INJECTION, POWDER, FOR SOLUTION INTRAVENOUS at 13:28

## 2017-10-31 RX ADMIN — Medication: at 22:20

## 2017-10-31 RX ADMIN — METOPROLOL TARTRATE 12.5 MG: 25 TABLET, FILM COATED ORAL at 07:57

## 2017-10-31 RX ADMIN — MULTIVITAMIN 15 ML: LIQUID ORAL at 07:57

## 2017-10-31 RX ADMIN — ACETAMINOPHEN 975 MG: 325 SOLUTION ORAL at 00:45

## 2017-10-31 RX ADMIN — NYSTATIN 500000 UNITS: 100000 SUSPENSION ORAL at 03:32

## 2017-10-31 RX ADMIN — NYSTATIN 500000 UNITS: 100000 SUSPENSION ORAL at 20:05

## 2017-10-31 RX ADMIN — QUETIAPINE FUMARATE 100 MG: 50 TABLET, FILM COATED ORAL at 10:35

## 2017-10-31 RX ADMIN — NYSTATIN 500000 UNITS: 100000 SUSPENSION ORAL at 13:29

## 2017-10-31 RX ADMIN — Medication 1 PACKET: at 09:18

## 2017-10-31 RX ADMIN — Medication 300 MG: at 07:58

## 2017-10-31 RX ADMIN — NAFCILLIN: 10 INJECTION, POWDER, FOR SOLUTION INTRAVENOUS at 23:46

## 2017-10-31 RX ADMIN — ACETAMINOPHEN 975 MG: 325 SOLUTION ORAL at 07:56

## 2017-10-31 RX ADMIN — DEXMEDETOMIDINE HYDROCHLORIDE 0.6 MCG/KG/HR: 4 INJECTION, SOLUTION INTRAVENOUS at 22:10

## 2017-10-31 RX ADMIN — INSULIN ASPART 1 UNITS: 100 INJECTION, SOLUTION INTRAVENOUS; SUBCUTANEOUS at 23:45

## 2017-10-31 RX ADMIN — FENTANYL CITRATE 25 MCG: 50 INJECTION INTRAMUSCULAR; INTRAVENOUS at 04:05

## 2017-10-31 RX ADMIN — POTASSIUM CHLORIDE 20 MEQ: 1.5 POWDER, FOR SOLUTION ORAL at 22:29

## 2017-10-31 RX ADMIN — Medication 5 MG: at 11:21

## 2017-10-31 RX ADMIN — ACETAMINOPHEN 975 MG: 325 SOLUTION ORAL at 23:50

## 2017-10-31 RX ADMIN — HEPARIN SODIUM 5000 UNITS: 5000 INJECTION, SOLUTION INTRAVENOUS; SUBCUTANEOUS at 11:36

## 2017-10-31 RX ADMIN — NAFCILLIN: 10 INJECTION, POWDER, FOR SOLUTION INTRAVENOUS at 03:32

## 2017-10-31 RX ADMIN — QUETIAPINE FUMARATE 150 MG: 50 TABLET, FILM COATED ORAL at 19:56

## 2017-10-31 RX ADMIN — ACETAMINOPHEN 975 MG: 325 SOLUTION ORAL at 15:30

## 2017-10-31 RX ADMIN — Medication 1 PACKET: at 13:35

## 2017-10-31 RX ADMIN — Medication 20 MG: at 19:54

## 2017-10-31 RX ADMIN — FAMOTIDINE 20 MG: 40 POWDER, FOR SUSPENSION ORAL at 07:58

## 2017-10-31 RX ADMIN — Medication 5 MG: at 18:19

## 2017-10-31 RX ADMIN — INSULIN ASPART 1 UNITS: 100 INJECTION, SOLUTION INTRAVENOUS; SUBCUTANEOUS at 09:12

## 2017-10-31 RX ADMIN — FENTANYL CITRATE 25 MCG: 50 INJECTION INTRAMUSCULAR; INTRAVENOUS at 16:05

## 2017-10-31 RX ADMIN — QUETIAPINE FUMARATE 150 MG: 50 TABLET, FILM COATED ORAL at 13:29

## 2017-10-31 RX ADMIN — Medication 300 MG: at 20:30

## 2017-10-31 RX ADMIN — METOPROLOL TARTRATE 12.5 MG: 25 TABLET, FILM COATED ORAL at 12:16

## 2017-10-31 RX ADMIN — Medication 1 PACKET: at 20:19

## 2017-10-31 RX ADMIN — FENTANYL CITRATE 25 MCG: 50 INJECTION INTRAMUSCULAR; INTRAVENOUS at 13:42

## 2017-10-31 RX ADMIN — NAFCILLIN: 10 INJECTION, POWDER, FOR SOLUTION INTRAVENOUS at 15:31

## 2017-10-31 RX ADMIN — QUETIAPINE FUMARATE 50 MG: 50 TABLET, FILM COATED ORAL at 07:57

## 2017-10-31 RX ADMIN — Medication 5 MG: at 08:16

## 2017-10-31 RX ADMIN — ASPIRIN 81 MG CHEWABLE TABLET 81 MG: 81 TABLET CHEWABLE at 07:57

## 2017-10-31 RX ADMIN — NYSTATIN 500000 UNITS: 100000 SUSPENSION ORAL at 07:57

## 2017-10-31 RX ADMIN — ONDANSETRON 4 MG: 2 INJECTION INTRAMUSCULAR; INTRAVENOUS at 08:16

## 2017-10-31 RX ADMIN — NAFCILLIN: 10 INJECTION, POWDER, FOR SOLUTION INTRAVENOUS at 20:21

## 2017-10-31 ASSESSMENT — VISUAL ACUITY: OU: GLASSES

## 2017-10-31 NOTE — PROGRESS NOTES
CV: Sinus rhythm/ sinus tach. Hypertensive.  Labetalol, fentanyl, Seroquel given. Plan is to continue to monitor. MD aware. Afebrile.    Pulmonary: Trach dome at 40% in afternoon. Tolerating well. Lungs coarse/ diminished. Secretions are thick, white.     GI: Tube feedings started. Goal rate of 55, standard flushes. Feeds will be increased to 20 at 1700. Tolerating well.    : Rectal tube in place. Condom cath in place. Adequate urine output.     Neuro: Able to stick out tongue, and wiggle tongue left/right. Able to squeeze left hand and wiggle left toes. At 1600 assessment, squeezed right hand. Right lower extremity withdraws to pain.     Gtt:  Precedex: 0.3     Plan to wean off precedex and transition to LTAC.

## 2017-10-31 NOTE — PLAN OF CARE
"Problem: Patient Care Overview  Goal: Plan of Care/Patient Progress Review  Outcome: No Change  D: 62 year old with history of AVR and ascending aortic repair in May 2016 presented on 10/6 with acute mental status changes found to have large moises-aortic fluid collection and multiple strokes likely septic emboli.    I/A: PERRL, L facial droop, intermittently following commands to smile, squeeze eyes shut, shake head \"yes,\" squeeze L hand. Patient moves left arm and leg against gravity, moves right index finger spontaneously. Precedex between 0.3 and 0.7 mcg/kg/min for agitation. BP labile, SBP < 170 with scheduled metoprolol, 25 mcg fentanyl x 2 for pain. Pressure support overnight. PEG tube to gravity, condom catheter with adequate output, rectal tube with 275 mL this shift. Please see flowsheets for for vitals and assessments.  P: Continue with IV antibiotics. Trach dome as tolerated. Continue to monitor and treat as ordered, update MD with concerns. Offer support to patient and family as able.            "

## 2017-10-31 NOTE — PROGRESS NOTES
CV ICU Progress Note    POD: 1 Day Post-Op  LOS: 25    Admission History: Mr. Cricket Miguel is a 62 year old gentleman w/ h/o AVR and ascending aortic repair in 2016 admitted with acute mental status changes found to have large moises-aortic fluid collection and multiple strokes likely septic emboli.  MRI findings showed left posterior MCA, left PICA, and small right PCA infarcts.    S/Interval History:   PEG placed yesterday (10/30/17).  Mildly hypotensive during procedure, but rebounded well overnight.  Mildly hypertensive during the daytime hours yesterday, likely due to anxiety.  Planning for LTAC placement once weaned off of Precedex.  Seroquel increased to wean Precedex.  Planning on transitioning care to [facility], per discussion with daughter Eloy.      O:    Temp: 98.8  F (37.1  C) Temp  Min: 97  F (36.1  C)  Max: 99.5  F (37.5  C)  Resp: 13 Resp  Min: 9  Max: 33  SpO2: 99 % SpO2  Min: 98 %  Max: 100 %    No Data Recorded  Heart Rate: 75 Heart Rate  Min: 60  Max: 99  BP: 96/67 Systolic (24hrs), Av , Min:96 , Max:96   Diastolic (24hrs), Av, Min:67, Max:67    Ventilation Mode: CPAP/PS  FiO2 (%): 40 %  Rate Set (breaths/minute): 12 breaths/min  Tidal Volume Set (mL): 500 mL  PEEP (cm H2O): 7 cmH2O  Pressure Support (cm H2O): 7 cmH2O  Oxygen Concentration (%): 40 %  Peak Inspiratory Pressure (cm H2O) (Drager Rosio): 3  Resp: 13  Date 10/16/17 0700 - 10/17/17 0659   Shift 5480-6624 8688-3559 2915-6651 24 Hour Total   I  N  T  A  K  E   I.V. 114.6   114.6    NG/   400    Enteral 240   240    Shift Total  (mL/kg) 754.6  (8.34)   754.6  (8.34)   O  U  T  P  U  T   Urine 575   575    Shift Total  (mL/kg) 575  (6.35)   575  (6.35)   Weight (kg) 90.5 90.5 90.5 90.5         Past Medical History:   Diagnosis Date     AI (aortic insufficiency)      Aortic root dilatation (H)      AR (aortic regurgitation)     severe     Cardiomyopathy (H)      CHF (congestive heart failure), NYHA class II (H)      COPD,  mild (H)     spirometry 12/16     Heart murmur      Hyperlipidemia LDL goal <70 10/31/2010     Hypertension goal BP (blood pressure) < 140/90      Inguinal hernia      left lung nodule  1/29/2008     Major depressive disorder, single episode, moderate (H)      Psoriasis childhood     Tobacco use disorder        Past Surgical History:   Procedure Laterality Date     ARTHROSCOPY KNEE INCISION AND DRAINAGE Left 10/8/2017    Procedure: ARTHROSCOPY KNEE INCISION AND DRAINAGE;  Arthroscopic Incision and Drainage of Left Knee;  Surgeon: Lucretia Munoz MD;  Location: [facility] OR     HC SHOULDER ARTHROSCOPY, DX  2001    Arthroscopy, Shoulder RT Dr OSIRIS Andersen     HC SHOULDER ARTHROSCOPY, DX  1/04    LT arthroscopy Dr OSIRIS Andersen     HERNIA REPAIR, UMBILICAL  1/08    incarcerated - dr rockwell     HERNIORRHAPHY INGUINAL  12/21/2012    HERNIORRHAPHY INGUINAL;  Right Inguinal Hernia Repair with mesh ;  Surgeon: Mello Rockwell MD;  Location: [facility] OR     REPLACE VALVE AORTIC N/A 5/17/2016    REPLACE VALVE AORTIC - Dr Bowers     ROTATOR CUFF REPAIR RT/LT  11/10    Rt arthroscopy - Dr OSIRIS Andersen     SURGICAL HISTORY OF -   5/16    AVR, severe AR, aortic root repair     TRACHEOSTOMY PERCUTANEOUS  10/27/2017            Physical Exam    General: Trached, lightly sedated, in no distress   Neck: Supple, no tracheal deviation  Cardiovascular: Regular tachy, RRR  Pumonary: Non labored breathing w/ PS.  CTAB   Abdomen: Soft, non distended, non tender.   Ext: Upper and lower extremity mild edema  Neuro: Small sluggishly reactive pupil bilaterally approximately 2mm - Left oculocephalic & LT Corneal abscent with mild intermittent nystagmus to the RT side.  Tracks.  Opens eyes to voice.  Nods appropriately to questions periodically.  Unchanged.    Lab Results   Component Value Date    WBC 6.5 10/31/2017    HGB 8.4 (L) 10/31/2017    HCT 28.0 (L) 10/31/2017     10/31/2017     10/31/2017    POTASSIUM 4.4 10/31/2017    CHLORIDE 107  10/31/2017    CO2 30 10/31/2017    BUN 29 10/31/2017    CR 1.41 (H) 10/31/2017     (H) 10/31/2017     (H) 10/23/2017    DD 1.7 (H) 04/25/2016    NTBNPI 2768 (H) 04/25/2016    NTBNP 1302 (H) 06/03/2016    TROPONIN <0.07 12/05/2005    TROPI 0.634 (HH) 10/08/2017    AST 46 (H) 10/24/2017    ALT 42 10/24/2017    ALKPHOS 85 10/06/2017    BILITOTAL 1.5 (H) 10/06/2017    INR 1.07 10/30/2017           dexmedetomidine 0.3 mcg/kg/hr (10/31/17 0916)     IV fluid REPLACEMENT ONLY           Assessment and Plan: Mr. Cricket Miguel is a 62 year old gentleman w/ h/o AVR and ascending aortic repair in 5/2016 admitted with acute mental status changes found to have large moises-aortic fluid collection and multiple strokes likely septic emboli.  Concern for moises-infarct edema leading to expansion of the cerebellar infarct.    Neuro:     Multifocal CVA: L cerebellar, L MCA, and R occipital regions.  Concern for septic emboli.  Small microhemorrhages were observed.  Progressive edema noted.  Neurosurgery and neurocritical care are following.  Angio showed no evidence of mycotic aneurysms.  Neuro exam stable.    Neurocrit team comments:  From his stroke, expected deficit include some language difficulty, right and left confusion, some trouble with calculation, possible visual field cut, and coordination with gait imbalance. Motor weakness from the stroke should include right hemiparesis/hemiplegia. 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 Neurological prognosis is good with very slow recovery, which will take months.    Physical exam showing mild improvement.  Patient opens eyes and periodically nods appropriately to questions.      ASA 81mg    Heparin 5000 U sq q8 hours.  Held for PEG.  Restart after.    Acetaminophen, Ibuprofen, Fentanyl prn    Precedex gtt for anxiety is being titrated  off.    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  1st degree AVB.     Tagged WBC scan showed likely infectious etiology.    Surgical date will be scheduled after patient has shown stabilization and recovery from neurologic insult.    BP lability    Metoprolol 12.5 bid    Added on additional 12.5 in am now    Labetalol 5-10 mg PRN SBP >160    Increasing Seroquel to 150 mg tid.    Tachycardia    Metoprolol started 10/19  GI/FEN:     PEG placed 10/30/17.    Restarting feeds now.  Renal:     Monitor electrolytes, Cr, and urine output     Lytes goals K>4 and Mag>2    JOSE:     Monitor Urine Output. Cr      Volume up today.  Possible diuresis this afternoon after PEG/Trach.    Hypernatremia, resolved    FWF decreased to 30 q4    Goal Na+ 145-155 per [facility]  Endo:    Glucose checks, SSI to serum glucose >80, <180 mg/dL  ID:     Sepsis: MSSA bacteremia with likely source of the L knee septic arthritis. Knee is s/p arthroscopic irrigation and debridement 10/8. Renal function has recovered.  Otherwise hemodynamically stable without pressors.      ID consultant recommended:    Continue Nafcillin & Rifampin a minimum of 3 months and even longer if pt hasn't had surgery by that point (i.e. Continue through surgery)    Continue Levofloxacin for 4 weeks from start of therapy (10/07-11/04)    Stop Gentamicin    F/U in ID clinic if discharged to LTAC     Nystatin (oral candidiasis)     PET scan 10/19 revealed likely infectious fluid around ascending aorta    Tooth Remnant, left lower canine    OMF o/b, rec continuing abx and outpatient f/u     Monitor WBC daily   Heme:     Hgb:  stable  Prophylaxis: SCDs, Famotidine, ASA  Lines:      CVC 25 days    Espinoza 25 days    Trach 4 days      Dispo: Continue ICU care.  LTAC to [facility] as soon as Precedex is weaned.    Code Status: Full Code    Jeramie Orellana,  DO  Anesthesiology Resident  CVICU Service, *[phone]    Patient seen and staffed with attending.

## 2017-10-31 NOTE — PLAN OF CARE
Problem: Patient Care Overview  Goal: Plan of Care/Patient Progress Review  Discharge Planner PT   Patient plan for discharge: Unable to State  Current status: Pt currently requires Dep A for bed mobility, pt tx bed->chair with Dep A and Liko Lift sling. PT performed PROM BUE and BLE all joints and planes, pt able to  PT fingers to command with left hand.   Barriers to return to prior living situation: Pt is currently dependent with all cares.   Recommendations for discharge: LTACH  Rationale for recommendations: Pt is currently well below baseline level of function, will benefit from additional skilled PT, OT and SLP services to maximize independence and improve function.       Entered by: Izabela Weber 10/31/2017 11:51 AM

## 2017-10-31 NOTE — PROGRESS NOTES
CLINICAL NUTRITION SERVICES - progress toward nutrition POC. See 10/27 note for full assessment.    -GI: 1300 mL stool out yesterday, not receiving bowel regimen. Received PEG yesterday.    Interventions  -Ordered 1 pkt TID Nutrisource fiber per MD request. This provides additional 9 g soluble fiber.      RD will continue to follow.    Maricel Childers RD, LD, [facility]  CVICU Dietitian  Pager: [phone]

## 2017-10-31 NOTE — PROGRESS NOTES
CVTS Daily Note  10/31/2017  Attending: Ramesh Kuo, *    S:   No overnight events. More awake but still not following commands. Moving LUE    O:   Vitals:    10/31/17 1100 10/31/17 1125 10/31/17 1200 10/31/17 1300   BP:       BP Location:       Pulse:       Resp: 29 16 16 18   Temp:   98  F (36.7  C)    TempSrc:   Oral    SpO2: 100% 98% 100% 98%   Weight:       Height:         Vitals:    10/29/17 0400 10/30/17 0400 10/31/17 0000   Weight: 93.2 kg (205 lb 7.5 oz) 91.4 kg (201 lb 8 oz) 89 kg (196 lb 3.4 oz)     -3 kg since admit      Intake/Output Summary (Last 24 hours) at 10/31/17 1422  Last data filed at 10/31/17 1400   Gross per 24 hour   Intake            901.2 ml   Output             2810 ml   Net          -1908.8 ml       General:  lightly sedated, does not follow commands but tracking and trying to mouth words.opens eyes spontaneously.   Neck: Supple, trach in place, mild left jaw swelling  Cardiovascular: RRR  Pumonary: Non labored breathing on MV  Abdomen: Soft, non distended, non tender, PEG intact  Ext: Minimal edema, warm  Neuro: Does not follow commands    Labs:  BMP  Recent Labs  Lab 10/31/17  0350 10/30/17  0423 10/29/17  0340 10/28/17  0336 10/27/17  1449    146* 145* 143  --    POTASSIUM 4.4 4.0 3.7 3.5 4.1   CHLORIDE 107 112* 111* 110*  --    CO2 30 28 27 25  --    BUN 29 28 29 28  --    CR 1.41* 1.33* 1.29* 1.44*  --    * 164* 164* 165*  --    MAG 2.2  --  2.2 2.2 2.1   PHOS 4.6*  --  3.6 3.3 4.3     CBC  Recent Labs  Lab 10/31/17  0350 10/30/17  0423 10/29/17  0340 10/28/17  0336   WBC 6.5 6.5 7.4 8.7   HGB 8.4* 8.1* 8.6* 9.0*    247 281 295     INR/PTT  Recent Labs  Lab 10/30/17  0423   INR 1.07     ABG  Recent Labs  Lab 10/26/17  0358 10/25/17  0346   PH 7.40 7.41   PCO2 43 41   PO2 67* 105   HCO3 27 26     LFTNo lab results found in last 7 days.    GLUCOSE:     Recent Labs  Lab 10/31/17  1135 10/31/17  0911 10/31/17  0350 10/31/17  0344 10/31/17  0104  10/30/17  2020 10/30/17  1646  10/30/17  0423  10/29/17  0340  10/28/17  0336  10/27/17  0430  10/26/17  1513   GLC  --   --  106*  --   --   --   --   --  164*  --  164*  --  165*  --  164*  --  130*   * 162*  --  98 101* 112* 90  < >  --   < >  --   < >  --   < >  --   < > 129*   < > = values in this interval not displayed.      A/P:     Cricket Miguel is a 62 year old with history of AVR and ascending aortic repair in May 2016 presented on 10/6 with acute mental status changes found to have large moises-aortic fluid collection and multiple strokes likely septic emboli.    Neuro: Tylenol scheduled for fevers,continue neuro checks Q2H,  precedex and wake up as able. Increase seroquel to wean precedex. Okay to use ibuprofen for fevers if needed per neuro. MRI head and spine with interval changes as expected, no new findings.   Resp: Inability to protect airway requiring mechanical ventilation, on minimal vent settings, did well with pressure support. Trach placed, PS then trach dome as tolerated  CV: Goal normotension per neurology/neurosurgery. Timing of surgery tbd. ASA. Low dose metoprolol BID for intermittent SVT, increase today for hypertension  GI/FEN: Tube feeds to goal, continue free water for hypernatremia (goal 145-155, go slow per neuro), PEG today  Renal: JOSE non-oliguric.  Endo: SSI  ID: Continue current antibiotic regimen pending ID recommendations (Nafcillin, levofloxacin, gentamycin, rifampin), daily blood cultures negative since 10/13. Added rifampin to abx per ID. Stopped daily cultures since they have been negative. WBC slowly decreasing. PET scan done showed increased uptake around the heart and knee. OMFS consulted for tooth infection as per Dentistry. CT neck shows no abscess, cellulitis.   Heme: No bleeding concerns at this time, hold anticoagulation per neuro and neurosurgery  Prophylaxis: H2 blocker, heparin SC      Dispo: CVICU    Discussed with CVTS Fellow   Staff surgeons have been  informed of changes through both  verbal and written communication.        Walter Bullock   Surgery PGY3  [phone]

## 2017-10-31 NOTE — PROGRESS NOTES
Care Coordinator- Discharge Planning     Admission Date/Time:  10/6/2017  Attending MD:  Ramesh Kuo, *     Data  Chart reviewed, discussed with interdisciplinary team.   Patient was admitted for:   1. S/P AVR (aortic valve replacement)    2. Dissection of aorta, unspecified portion of aorta (H)    3. Sepsis due to other etiology (H)    4. NSTEMI (non-ST elevated myocardial infarction) (H)    5. Hypotension, unspecified hypotension type    6. 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  The team reported pt is medically ready to be transfer to [facility].  Pt is still on precedex and need to be off precedex prior transferring to [facility].  CC talked to pt SO, Meghna and dtr Eloy, by phone about their preference and they both said their preference is [facility].  The team reported the plan is to wean pt from precedex today and pt might be ready by tomorrow, if remain stable.  CC will con. to follow plan of care.    Coordination of Care and Referrals: Provided patient/family with options for LTACH.      Plan  Anticipated Discharge Date:  1-2 days.  Anticipated Discharge Plan:   [facility].  CC will cont to follow plan of care.      Matthwe Keller RN, PHN, BSN  4A and 4E/ ICU  Care Coordinator  Phone: [phone]  Pager: [phone]

## 2017-11-01 ENCOUNTER — APPOINTMENT (OUTPATIENT)
Dept: PHYSICAL THERAPY | Facility: CLINIC | Age: 62
DRG: 004 | End: 2017-11-01
Attending: PSYCHIATRY & NEUROLOGY
Payer: COMMERCIAL

## 2017-11-01 LAB
ALT SERPL W P-5'-P-CCNC: 52 U/L (ref 0–70)
ANION GAP SERPL CALCULATED.3IONS-SCNC: 6 MMOL/L (ref 3–14)
AST SERPL W P-5'-P-CCNC: 28 U/L (ref 0–45)
BUN SERPL-MCNC: 28 MG/DL (ref 7–30)
CALCIUM SERPL-MCNC: 8.5 MG/DL (ref 8.5–10.1)
CHLORIDE SERPL-SCNC: 108 MMOL/L (ref 94–109)
CO2 SERPL-SCNC: 30 MMOL/L (ref 20–32)
CREAT SERPL-MCNC: 1.47 MG/DL (ref 0.66–1.25)
ERYTHROCYTE [DISTWIDTH] IN BLOOD BY AUTOMATED COUNT: 20.2 % (ref 10–15)
GFR SERPL CREATININE-BSD FRML MDRD: 48 ML/MIN/1.7M2
GLUCOSE BLDC GLUCOMTR-MCNC: 112 MG/DL (ref 70–99)
GLUCOSE BLDC GLUCOMTR-MCNC: 127 MG/DL (ref 70–99)
GLUCOSE BLDC GLUCOMTR-MCNC: 130 MG/DL (ref 70–99)
GLUCOSE BLDC GLUCOMTR-MCNC: 134 MG/DL (ref 70–99)
GLUCOSE BLDC GLUCOMTR-MCNC: 148 MG/DL (ref 70–99)
GLUCOSE BLDC GLUCOMTR-MCNC: 150 MG/DL (ref 70–99)
GLUCOSE SERPL-MCNC: 164 MG/DL (ref 70–99)
HCT VFR BLD AUTO: 27 % (ref 40–53)
HGB BLD-MCNC: 8.5 G/DL (ref 13.3–17.7)
MAGNESIUM SERPL-MCNC: 2.2 MG/DL (ref 1.6–2.3)
MCH RBC QN AUTO: 30.8 PG (ref 26.5–33)
MCHC RBC AUTO-ENTMCNC: 31.5 G/DL (ref 31.5–36.5)
MCV RBC AUTO: 98 FL (ref 78–100)
PHOSPHATE SERPL-MCNC: 4.2 MG/DL (ref 2.5–4.5)
PLATELET # BLD AUTO: 217 10E9/L (ref 150–450)
POTASSIUM SERPL-SCNC: 3.8 MMOL/L (ref 3.4–5.3)
RBC # BLD AUTO: 2.76 10E12/L (ref 4.4–5.9)
SODIUM SERPL-SCNC: 144 MMOL/L (ref 133–144)
WBC # BLD AUTO: 5.9 10E9/L (ref 4–11)

## 2017-11-01 PROCEDURE — 25000132 ZZH RX MED GY IP 250 OP 250 PS 637: Performed by: THORACIC SURGERY (CARDIOTHORACIC VASCULAR SURGERY)

## 2017-11-01 PROCEDURE — 25000132 ZZH RX MED GY IP 250 OP 250 PS 637: Performed by: ANESTHESIOLOGY

## 2017-11-01 PROCEDURE — 25000128 H RX IP 250 OP 636: Performed by: STUDENT IN AN ORGANIZED HEALTH CARE EDUCATION/TRAINING PROGRAM

## 2017-11-01 PROCEDURE — 97110 THERAPEUTIC EXERCISES: CPT | Mod: GP

## 2017-11-01 PROCEDURE — 25000128 H RX IP 250 OP 636: Performed by: THORACIC SURGERY (CARDIOTHORACIC VASCULAR SURGERY)

## 2017-11-01 PROCEDURE — 94150 VITAL CAPACITY TEST: CPT

## 2017-11-01 PROCEDURE — 25000128 H RX IP 250 OP 636: Performed by: ANESTHESIOLOGY

## 2017-11-01 PROCEDURE — 00000146 ZZHCL STATISTIC GLUCOSE BY METER IP

## 2017-11-01 PROCEDURE — 25000132 ZZH RX MED GY IP 250 OP 250 PS 637: Performed by: SURGERY

## 2017-11-01 PROCEDURE — 85027 COMPLETE CBC AUTOMATED: CPT | Performed by: ORTHOPAEDIC SURGERY

## 2017-11-01 PROCEDURE — 40000193 ZZH STATISTIC PT WARD VISIT

## 2017-11-01 PROCEDURE — 25000132 ZZH RX MED GY IP 250 OP 250 PS 637: Performed by: STUDENT IN AN ORGANIZED HEALTH CARE EDUCATION/TRAINING PROGRAM

## 2017-11-01 PROCEDURE — 27210913 ZZH NUTRITION PRODUCT INTERMEDIATE PACKET

## 2017-11-01 PROCEDURE — 97530 THERAPEUTIC ACTIVITIES: CPT | Mod: GP

## 2017-11-01 PROCEDURE — S0032 INJECTION, NAFCILLIN SODIUM: HCPCS | Performed by: THORACIC SURGERY (CARDIOTHORACIC VASCULAR SURGERY)

## 2017-11-01 PROCEDURE — 99291 CRITICAL CARE FIRST HOUR: CPT | Mod: GC | Performed by: SURGERY

## 2017-11-01 PROCEDURE — 20000004 ZZH R&B ICU UMMC

## 2017-11-01 PROCEDURE — 83735 ASSAY OF MAGNESIUM: CPT | Performed by: ORTHOPAEDIC SURGERY

## 2017-11-01 PROCEDURE — 84460 ALANINE AMINO (ALT) (SGPT): CPT | Performed by: ORTHOPAEDIC SURGERY

## 2017-11-01 PROCEDURE — 80048 BASIC METABOLIC PNL TOTAL CA: CPT | Performed by: ORTHOPAEDIC SURGERY

## 2017-11-01 PROCEDURE — 84450 TRANSFERASE (AST) (SGOT): CPT | Performed by: ORTHOPAEDIC SURGERY

## 2017-11-01 PROCEDURE — 25000125 ZZHC RX 250: Performed by: THORACIC SURGERY (CARDIOTHORACIC VASCULAR SURGERY)

## 2017-11-01 PROCEDURE — 25000128 H RX IP 250 OP 636: Performed by: SURGERY

## 2017-11-01 PROCEDURE — 40000014 ZZH STATISTIC ARTERIAL MONITORING DAILY

## 2017-11-01 PROCEDURE — 84100 ASSAY OF PHOSPHORUS: CPT | Performed by: ORTHOPAEDIC SURGERY

## 2017-11-01 PROCEDURE — 25000125 ZZHC RX 250: Performed by: STUDENT IN AN ORGANIZED HEALTH CARE EDUCATION/TRAINING PROGRAM

## 2017-11-01 PROCEDURE — 40000275 ZZH STATISTIC RCP TIME EA 10 MIN

## 2017-11-01 PROCEDURE — 94003 VENT MGMT INPAT SUBQ DAY: CPT

## 2017-11-01 RX ORDER — OXYCODONE HYDROCHLORIDE 5 MG/1
5-10 TABLET ORAL EVERY 4 HOURS PRN
Status: DISCONTINUED | OUTPATIENT
Start: 2017-11-01 | End: 2017-11-04 | Stop reason: HOSPADM

## 2017-11-01 RX ORDER — LABETALOL 100 MG/1
100 TABLET, FILM COATED ORAL ONCE
Status: COMPLETED | OUTPATIENT
Start: 2017-11-01 | End: 2017-11-01

## 2017-11-01 RX ORDER — LABETALOL 200 MG/1
200 TABLET, FILM COATED ORAL DAILY
Status: DISCONTINUED | OUTPATIENT
Start: 2017-11-02 | End: 2017-11-03

## 2017-11-01 RX ORDER — AMLODIPINE BESYLATE 5 MG/1
5 TABLET ORAL DAILY
Status: DISCONTINUED | OUTPATIENT
Start: 2017-11-01 | End: 2017-11-04 | Stop reason: HOSPADM

## 2017-11-01 RX ORDER — HYDRALAZINE HYDROCHLORIDE 20 MG/ML
10 INJECTION INTRAMUSCULAR; INTRAVENOUS EVERY 6 HOURS PRN
Status: DISCONTINUED | OUTPATIENT
Start: 2017-11-01 | End: 2017-11-02

## 2017-11-01 RX ADMIN — LEVOFLOXACIN 750 MG: 5 INJECTION, SOLUTION INTRAVENOUS at 10:44

## 2017-11-01 RX ADMIN — QUETIAPINE FUMARATE 150 MG: 50 TABLET, FILM COATED ORAL at 07:44

## 2017-11-01 RX ADMIN — Medication 1 PACKET: at 13:37

## 2017-11-01 RX ADMIN — NYSTATIN 500000 UNITS: 100000 SUSPENSION ORAL at 13:37

## 2017-11-01 RX ADMIN — FAMOTIDINE 20 MG: 40 POWDER, FOR SUSPENSION ORAL at 07:43

## 2017-11-01 RX ADMIN — ACETAMINOPHEN 975 MG: 325 SOLUTION ORAL at 23:54

## 2017-11-01 RX ADMIN — ACETAMINOPHEN 975 MG: 325 SOLUTION ORAL at 07:43

## 2017-11-01 RX ADMIN — Medication 1 PACKET: at 07:44

## 2017-11-01 RX ADMIN — ACETAMINOPHEN 975 MG: 325 SOLUTION ORAL at 17:34

## 2017-11-01 RX ADMIN — QUETIAPINE FUMARATE 150 MG: 50 TABLET, FILM COATED ORAL at 13:37

## 2017-11-01 RX ADMIN — METOPROLOL TARTRATE 12.5 MG: 25 TABLET, FILM COATED ORAL at 07:43

## 2017-11-01 RX ADMIN — HYDRALAZINE HYDROCHLORIDE 10 MG: 20 INJECTION INTRAMUSCULAR; INTRAVENOUS at 23:05

## 2017-11-01 RX ADMIN — LABETALOL HCL 100 MG: 100 TABLET, FILM COATED ORAL at 12:58

## 2017-11-01 RX ADMIN — DEXMEDETOMIDINE HYDROCHLORIDE 0.4 MCG/KG/HR: 4 INJECTION, SOLUTION INTRAVENOUS at 05:09

## 2017-11-01 RX ADMIN — AMLODIPINE BESYLATE 5 MG: 5 TABLET ORAL at 13:51

## 2017-11-01 RX ADMIN — NAFCILLIN: 10 INJECTION, POWDER, FOR SOLUTION INTRAVENOUS at 17:34

## 2017-11-01 RX ADMIN — Medication 20 MG: at 05:41

## 2017-11-01 RX ADMIN — Medication 300 MG: at 07:43

## 2017-11-01 RX ADMIN — Medication: at 17:34

## 2017-11-01 RX ADMIN — DEXMEDETOMIDINE HYDROCHLORIDE 0.3 MCG/KG/HR: 4 INJECTION, SOLUTION INTRAVENOUS at 20:03

## 2017-11-01 RX ADMIN — HEPARIN SODIUM 5000 UNITS: 5000 INJECTION, SOLUTION INTRAVENOUS; SUBCUTANEOUS at 11:52

## 2017-11-01 RX ADMIN — METOPROLOL TARTRATE 12.5 MG: 25 TABLET, FILM COATED ORAL at 08:33

## 2017-11-01 RX ADMIN — MULTIVITAMIN 15 ML: LIQUID ORAL at 07:44

## 2017-11-01 RX ADMIN — POTASSIUM CHLORIDE 20 MEQ: 1.5 POWDER, FOR SOLUTION ORAL at 05:02

## 2017-11-01 RX ADMIN — NYSTATIN 500000 UNITS: 100000 SUSPENSION ORAL at 07:44

## 2017-11-01 RX ADMIN — NAFCILLIN: 10 INJECTION, POWDER, FOR SOLUTION INTRAVENOUS at 07:44

## 2017-11-01 RX ADMIN — HEPARIN SODIUM 5000 UNITS: 5000 INJECTION, SOLUTION INTRAVENOUS; SUBCUTANEOUS at 03:49

## 2017-11-01 RX ADMIN — NAFCILLIN: 10 INJECTION, POWDER, FOR SOLUTION INTRAVENOUS at 12:38

## 2017-11-01 RX ADMIN — QUETIAPINE FUMARATE 150 MG: 50 TABLET, FILM COATED ORAL at 21:15

## 2017-11-01 RX ADMIN — NAFCILLIN: 10 INJECTION, POWDER, FOR SOLUTION INTRAVENOUS at 03:45

## 2017-11-01 RX ADMIN — INSULIN ASPART 1 UNITS: 100 INJECTION, SOLUTION INTRAVENOUS; SUBCUTANEOUS at 03:48

## 2017-11-01 RX ADMIN — Medication 300 MG: at 20:07

## 2017-11-01 RX ADMIN — ASPIRIN 81 MG CHEWABLE TABLET 81 MG: 81 TABLET CHEWABLE at 07:44

## 2017-11-01 RX ADMIN — NAFCILLIN: 10 INJECTION, POWDER, FOR SOLUTION INTRAVENOUS at 20:51

## 2017-11-01 RX ADMIN — HEPARIN SODIUM 5000 UNITS: 5000 INJECTION, SOLUTION INTRAVENOUS; SUBCUTANEOUS at 20:04

## 2017-11-01 RX ADMIN — NYSTATIN 500000 UNITS: 100000 SUSPENSION ORAL at 20:07

## 2017-11-01 RX ADMIN — INSULIN ASPART 1 UNITS: 100 INJECTION, SOLUTION INTRAVENOUS; SUBCUTANEOUS at 07:44

## 2017-11-01 RX ADMIN — NYSTATIN 500000 UNITS: 100000 SUSPENSION ORAL at 03:49

## 2017-11-01 RX ADMIN — Medication 1 PACKET: at 20:12

## 2017-11-01 ASSESSMENT — VISUAL ACUITY
OU: GLASSES

## 2017-11-01 NOTE — PROGRESS NOTES
CV ICU Progress Note    POD: 2 Days Post-Op  LOS: 26    Admission History: Mr. Cricket Miguel is a 62 year old gentleman w/ h/o AVR and ascending aortic repair in 2016 admitted with acute mental status changes found to have large moises-aortic fluid collection and multiple strokes likely septic emboli.  MRI findings showed left posterior MCA, left PICA, and small right PCA infarcts.    S/Interval History:   Daytime hypertension continued yesterday and today.  Metoprolol dose was increased yesterday, Labetaolol PRN was given, Fentanyl PRN was given.  Patient BP eventually dropped below 100 SBP overnight.  Today, BP meds adjusted.  Awaiting LTAC transfer once off of Precedex.       O:    Temp: 98.7  F (37.1  C) Temp  Min: 98.5  F (36.9  C)  Max: 98.9  F (37.2  C)  Resp: 13 Resp  Min: 9  Max: 35  SpO2: 99 % SpO2  Min: 82 %  Max: 100 %    No Data Recorded  Heart Rate: 99 Heart Rate  Min: 62  Max: 116  BP: (!) 145/91 Systolic (24hrs), Av , Min:145 , Max:145   Diastolic (24hrs), Av, Min:91, Max:91    Ventilation Mode: CPAP/PS  FiO2 (%): 40 %  PEEP (cm H2O): 7 cmH2O  Pressure Support (cm H2O): 7 cmH2O  Oxygen Concentration (%): 40 %  Resp: 13  Date 10/16/17 0700 - 10/17/17 0659   Shift 7048-9449 2350-9220 8167-1693 24 Hour Total   I  N  T  A  K  E   I.V. 114.6   114.6    NG/   400    Enteral 240   240    Shift Total  (mL/kg) 754.6  (8.34)   754.6  (8.34)   O  U  T  P  U  T   Urine 575   575    Shift Total  (mL/kg) 575  (6.35)   575  (6.35)   Weight (kg) 90.5 90.5 90.5 90.5         Past Medical History:   Diagnosis Date     AI (aortic insufficiency)      Aortic root dilatation (H)      AR (aortic regurgitation)     severe     Cardiomyopathy (H)      CHF (congestive heart failure), NYHA class II (H)      COPD, mild (H)     spirometry      Heart murmur      Hyperlipidemia LDL goal <70 10/31/2010     Hypertension goal BP (blood pressure) < 140/90      Inguinal hernia      left lung nodule  2008      Major depressive disorder, single episode, moderate (H)      Psoriasis childhood     Tobacco use disorder        Past Surgical History:   Procedure Laterality Date     ARTHROSCOPY KNEE INCISION AND DRAINAGE Left 10/8/2017    Procedure: ARTHROSCOPY KNEE INCISION AND DRAINAGE;  Arthroscopic Incision and Drainage of Left Knee;  Surgeon: Lucretia Munoz MD;  Location: [facility] OR     HC SHOULDER ARTHROSCOPY, DX  2001    Arthroscopy, Shoulder RT Dr OSIRIS Andersen     HC SHOULDER ARTHROSCOPY, DX  1/04    LT arthroscopy Dr OSIRIS Andersen     HERNIA REPAIR, UMBILICAL  1/08    incarcerated - dr rockwell     HERNIORRHAPHY INGUINAL  12/21/2012    HERNIORRHAPHY INGUINAL;  Right Inguinal Hernia Repair with mesh ;  Surgeon: Mello Rockwell MD;  Location: [facility] OR     REPLACE VALVE AORTIC N/A 5/17/2016    REPLACE VALVE AORTIC - Dr Bowers     ROTATOR CUFF REPAIR RT/LT  11/10    Rt arthroscopy - Dr OSIRIS Andersen     SURGICAL HISTORY OF -   5/16    AVR, severe AR, aortic root repair     TRACHEOSTOMY PERCUTANEOUS  10/27/2017            Physical Exam    General: Trached, lightly sedated, periods of agitation with BP and HR elevation when discussing his care in his room.  Neck: Supple, no tracheal deviation  Cardiovascular: Regular tachy, RRR  Pumonary: Non labored breathing w/ PS.  CTAB   Abdomen: Soft, non distended, non tender.   Ext: Upper and lower extremity mild edema  Neuro: Small sluggishly reactive pupil bilaterally approximately 2mm - Left oculocephalic & LT Corneal abscent with mild intermittent nystagmus to the RT side.  Tracks.  Opens eyes to voice.  Nods appropriately to questions periodically.  Left arm moves, but without great motor control.  Left leg moves, but without great control.  Right shoulder shrug.    Lab Results   Component Value Date    WBC 5.9 11/01/2017    HGB 8.5 (L) 11/01/2017    HCT 27.0 (L) 11/01/2017     11/01/2017     11/01/2017    POTASSIUM 3.8 11/01/2017    CHLORIDE 108 11/01/2017    CO2 30  11/01/2017    BUN 28 11/01/2017    CR 1.47 (H) 11/01/2017     (H) 11/01/2017     (H) 10/23/2017    DD 1.7 (H) 04/25/2016    NTBNPI 2768 (H) 04/25/2016    NTBNP 1302 (H) 06/03/2016    TROPONIN <0.07 12/05/2005    TROPI 0.634 (HH) 10/08/2017    AST 28 11/01/2017    ALT 52 11/01/2017    ALKPHOS 85 10/06/2017    BILITOTAL 1.5 (H) 10/06/2017    INR 1.07 10/30/2017           dexmedetomidine 0.2 mcg/kg/hr (11/01/17 1200)     IV fluid REPLACEMENT ONLY           Assessment and Plan: Mr. Cricket Miguel is a 62 year old gentleman w/ h/o AVR and ascending aortic repair in 5/2016 admitted with acute mental status changes found to have large moises-aortic fluid collection and multiple strokes likely septic emboli.  Concern for moises-infarct edema leading to expansion of the cerebellar infarct.    Neuro:     Multifocal CVA: L cerebellar, L MCA, and R occipital regions.  Concern for septic emboli.  Small microhemorrhages were observed.  Progressive edema noted.  Neurosurgery and neurocritical care are following.  Angio showed no evidence of mycotic aneurysms.  Neuro exam stable.    Neurocrit team comments:  From his stroke, expected deficit include some language difficulty, right and left confusion, some trouble with calculation, possible visual field cut, and coordination with gait imbalance. Motor weakness from the stroke should include right hemiparesis/hemiplegia. 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 Neurological prognosis is good with very slow recovery, which will take months.    Physical exam showing mild improvement.  Patient opens eyes and periodically nods appropriately to questions.      ASA 81mg    Heparin 5000 U sq q8 hours.      Acetaminophen, Ibuprofen prn    D/C Fentanyl for Oxycodone PRN    Precedex gtt for anxiety is being titrated off.    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  1st degree AVB.     Tagged WBC scan showed likely infectious etiology.    Surgical date will be scheduled after patient has shown stabilization and recovery from neurologic insult.    BP lability    D/C'd Metoprolol & PRN Labetalol    Labetalol 100 mg NJ in am    Only scheduled once daily because BP is normal overnight and per significant other, patient only took Beta Blocker at home in AM.    Restarted home Amlodipine 5 mg NJ    Only in AM as well    Seroquel 150 mg tid.    Tachycardia    Metoprolol 10/19 - 11/01/17    Labetalol 11/01/17  GI/FEN:     PEG placed 10/30/17.    TF @ goal  Renal:     Monitor electrolytes, Cr, and urine output     Lytes goals K>4 and Mag>2    JOSE:     Monitor Urine Output. Cr      Volume up today.  Possible diuresis this afternoon after PEG/Trach.    Hypernatremia, resolved    FWF decreased to 30 q4    Goal Na+ 145-155 per NCC  Endo:    Glucose checks, SSI to serum glucose >80, <180 mg/dL  ID:     Sepsis: MSSA bacteremia with likely source of the L knee septic arthritis. Knee is s/p arthroscopic irrigation and debridement 10/8. Renal function has recovered.  Otherwise hemodynamically stable without pressors.      ID consultant recommended:    Continue Nafcillin & Rifampin a minimum of 3 months and even longer if pt hasn't had surgery by that point (i.e. Continue through surgery)    Continue Levofloxacin for 4 weeks from start of therapy (10/07-11/04)    Stop Gentamicin    F/U in ID clinic if discharged to LTAC     Nystatin (oral candidiasis)     PET scan 10/19 revealed likely infectious fluid around ascending aorta    Tooth Remnant, left lower canine    OMF o/b, rec continuing abx and outpatient f/u     Monitor WBC daily   Heme:     Hgb:  stable  Prophylaxis: SCDs, Famotidine, ASA  Lines:      CVC 26 days    Espinoza 26 days    Trach 5  days      Dispo: Continue ICU care.  LTAC to [facility] as soon as Precedex is weaned.    Code Status: Full Code    Jeramie Orellana DO  Anesthesiology Resident  CVICU Service, *[phone]    Patient seen and staffed with attending.

## 2017-11-01 NOTE — PROGRESS NOTES
Neuro: No neuro changes noted from previous shift.    Cardiac: Sinus rhythm/ sinus tach. Normotensive in AM which progressed to hypertension in the afternoon. Metoprolol D/C and Labetalol and Amlodipine PO started. Afebrile.    Pulmonary: Trach dome 40%. Tolerating well. Thick, white secretions.     GI: Tube feeds at goal rate of 50. Standard flushes.     : Rectal tube in place. Condom cath in place.     Pt sat in chair most of day. Tolerated well.     Plan to wean of Precedex. Pt was between 0.2-0.3 mcg throughout shift.       Long term goal to D/C to LTAC

## 2017-11-01 NOTE — PLAN OF CARE
Problem: Patient Care Overview  Goal: Plan of Care/Patient Progress Review  Discharge Planner PT   Patient plan for discharge: LTACH  Current status: Engaged pt in rolling max A x 2 for sling placement, dependent transfer bed > chair, max A for positioning in chair, B LE/UE ROM and PROM with pt demonstrating 3-/5 muscle grade for L knee ext and L elbow extension with 2/5 activation noted on L shoulder flex and ankle DF. Pt with no movement of R UE/LE. Pt on trach dome 40% FiO2 with sats >90% and HR 90s.   Barriers to return to prior living situation: medical status, home set-up  Recommendations for discharge: LTACH/inpatient rehab  Rationale for recommendations: Pt with below baseline mobility, but improvement in command following and demonstrating some antigravity movement on L side. Pt will need extensive rehab.        Entered by: Sadia Euceda 11/01/2017 5:38 PM

## 2017-11-01 NOTE — PLAN OF CARE
Problem: Patient Care Overview  Goal: Plan of Care/Patient Progress Review  Outcome: Therapy, progress towards functional goals is fair  D/I: Patient's /120s upon change of shift.  At this time Cricket was also agitated and tachypneic.  Discussed       Elevated BP with CVTS resident. Elevated BP appeared to be mostly from agitation so resident wanted to       Titrate Precedex gtt to control patient's anxiety.  Scheduled Metoprolol, seroquel, and prn dose of IV Labetalol given.        Resident here to examine patient. Cricket was still restless until scheduled tylenol given at midnight.  Patient relaxed and      Rested well for 5-6 hours.   One additional dose of labetalol given at 0600 for SBP > 190s.        Neuro exam remains unchanged.   A: Precedex gtt at 0.2 - 0.5 mcg/k/hr overnight, and unable to discontinue.  Patient becomes very hypertensive with anxiety episodes.  P: Discuss further medication management to assist with discontinuing Precedex gtt.  Continue serial neuro exams.  Increase     Activity as able.  Tube feeds now at goal rate after PEG placement.

## 2017-11-01 NOTE — PROGRESS NOTES
Social Work Services Progress Note    Hospital Day: 27  Date of Initial Social Work Evaluation:  10/11/17  Collaborated with:  Patient's dtr, team rounds, chart review    Data:    Pt remains in ICU on Precedex. Once pt is off Precedex, he can transfer to [facility].    Intervention:    Met with pt's dtr to check in. She voices concern regarding pt's mental health as he continues to make improvements, and she is hoping pt can receive mental health treatment concurrently with rehab. Answered questions and provided education. Encouraged her to request SW within first couple of days at [facility] to address concerns. Provided emotional support - supportive listening, validation, encouragement, and anticipatory guidance.    Assessment:   As above.    Plan:    Anticipated Disposition:  Facility:  [facility]    Barriers to d/c plan:  Precedex    Follow Up:  SW continues to follow for support to pt and family and resource referral.    KAMAR Mason, [facility]  Adult ICU Clinical   Pager [phone]

## 2017-11-01 NOTE — PROGRESS NOTES
CVTS Daily Note  11/1/2017  Attending: Ramesh Kuo, *    S:   Agitation overnight, requiring precedex. Hypertensive past 2 days.     O:   Vitals:    11/01/17 1100 11/01/17 1200 11/01/17 1300 11/01/17 1400   BP:       BP Location:       Pulse:       Resp: 15 13 17 23   Temp:  98.7  F (37.1  C)     TempSrc:  Oral     SpO2: 98% 99% 100% 99%   Weight:       Height:         Vitals:    10/30/17 0400 10/31/17 0000 11/01/17 0200   Weight: 91.4 kg (201 lb 8 oz) 89 kg (196 lb 3.4 oz) 88.9 kg (195 lb 15.8 oz)         Intake/Output Summary (Last 24 hours) at 11/01/17 1429  Last data filed at 11/01/17 1400   Gross per 24 hour   Intake           2347.1 ml   Output             3660 ml   Net          -1312.9 ml       General: Trached, lightly sedated, periods of agitation with BP and HR elevation when discussing his care in his room.  Neck: Supple, no tracheal deviation  Cardiovascular: Regular tachy, RRR  Pumonary: Non labored breathing w/ PS.  CTAB   Abdomen: Soft, non distended, non tender.   Ext: Upper and lower extremity mild edema  Neuro: Small sluggishly reactive pupil bilaterally approximately 2mm - Left oculocephalic & LT Corneal abscent with mild intermittent nystagmus to the RT side.  Tracks.  Opens eyes to voice.  Nods appropriately to questions periodically.  Left arm moves, but without great motor control.  Left leg moves, but without great control.  Right shoulder shrug.    Labs:  [facility]    Recent Labs  Lab 11/01/17  0337 10/31/17  1800 10/31/17  0350 10/30/17  0423 10/29/17  0340 10/28/17  0336     --  144 146* 145* 143   POTASSIUM 3.8 3.6 4.4 4.0 3.7 3.5   CHLORIDE 108  --  107 112* 111* 110*   CO2 30  --  30 28 27 25   BUN 28  --  29 28 29 28   CR 1.47*  --  1.41* 1.33* 1.29* 1.44*   *  --  106* 164* 164* 165*   MAG 2.2 2.2 2.2  --  2.2 2.2   PHOS 4.2  --  4.6*  --  3.6 3.3     CBC    Recent Labs  Lab 11/01/17  0337 10/31/17  0350 10/30/17  0423 10/29/17  0340   WBC 5.9 6.5 6.5 7.4   HGB 8.5*  8.4* 8.1* 8.6*    229 247 281     INR/PTT    Recent Labs  Lab 10/30/17  0423   INR 1.07     ABG    Recent Labs  Lab 10/26/17  0358   PH 7.40   PCO2 43   PO2 67*   HCO3 27     LFT    Recent Labs  Lab 11/01/17  0337   AST 28   ALT 52       GLUCOSE:     Recent Labs  Lab 11/01/17  1151 11/01/17  0733 11/01/17  0337 11/01/17  0336 10/31/17  2344 10/31/17  2029 10/31/17  1529  10/31/17  0350  10/30/17  0423  10/29/17  0340  10/28/17  0336  10/27/17  0430   GLC  --   --  164*  --   --   --   --   --  106*  --  164*  --  164*  --  165*  --  164*   * 148*  --  150* 162* 126* 114*  < >  --   < >  --   < >  --   < >  --   < >  --    < > = values in this interval not displayed.      A/P:     Cricket Miguel is a 62 year old with history of AVR and ascending aortic repair in May 2016 presented on 10/6 with acute mental status changes found to have large moises-aortic fluid collection and multiple strokes likely septic emboli.    Neuro: Tylenol scheduled for fevers,continue neuro checks Q2H. Increase seroquel to wean precedex. Okay to use ibuprofen for fevers if needed per neuro. MRI head and spine with interval changes as expected, no new findings. D/c fentanyl for oxycodone  Resp: Inability to protect airway requiring mechanical ventilation, on minimal vent settings, did well with pressure support. Trach placed 10/27, PS then trach dome as tolerated  CV: Goal normotension per neurology/neurosurgery. Timing of surgery tbd. ASA. D/c metoprolol and prn labetalol, start scheduled labetalol 100 mg in AM.  GI/FEN: Tube feeds to goal, continue free water for hypernatremia (goal 145-155, go slow per neuro), PEG today  Renal: JOSE non-oliguric.  Endo: SSI  ID: Continue current antibiotic regimen pending ID recommendations (Nafcillin, levofloxacin, gentamycin, rifampin), daily blood cultures negative since 10/13. Added rifampin to abx per ID. Stopped daily cultures since they have been negative. WBC slowly decreasing.  PET scan done showed increased uptake around the heart and knee. OMFS consulted for tooth infection as per Dentistry. CT neck shows no abscess, cellulitis.   Heme: No bleeding concerns at this time, hold anticoagulation per neuro and neurosurgery  Prophylaxis: H2 blocker, heparin SC      Dispo: CVICU, LTAC to [place] as soon as Precedex is weaned    Discussed with CVTS Fellow   Staff surgeons have been informed of changes through both  verbal and written communication.      Benny Unger PA-C  Cardiothoracic Surgery  November 1, 2017 2:23 PM   p: [phone]

## 2017-11-02 ENCOUNTER — APPOINTMENT (OUTPATIENT)
Dept: PHYSICAL THERAPY | Facility: CLINIC | Age: 62
DRG: 004 | End: 2017-11-02
Attending: PSYCHIATRY & NEUROLOGY
Payer: COMMERCIAL

## 2017-11-02 LAB
ANION GAP SERPL CALCULATED.3IONS-SCNC: 7 MMOL/L (ref 3–14)
BUN SERPL-MCNC: 24 MG/DL (ref 7–30)
CALCIUM SERPL-MCNC: 8.1 MG/DL (ref 8.5–10.1)
CHLORIDE SERPL-SCNC: 106 MMOL/L (ref 94–109)
CO2 SERPL-SCNC: 30 MMOL/L (ref 20–32)
CREAT SERPL-MCNC: 1.31 MG/DL (ref 0.66–1.25)
ERYTHROCYTE [DISTWIDTH] IN BLOOD BY AUTOMATED COUNT: 20.3 % (ref 10–15)
GFR SERPL CREATININE-BSD FRML MDRD: 55 ML/MIN/1.7M2
GLUCOSE BLDC GLUCOMTR-MCNC: 115 MG/DL (ref 70–99)
GLUCOSE BLDC GLUCOMTR-MCNC: 121 MG/DL (ref 70–99)
GLUCOSE BLDC GLUCOMTR-MCNC: 152 MG/DL (ref 70–99)
GLUCOSE BLDC GLUCOMTR-MCNC: 158 MG/DL (ref 70–99)
GLUCOSE SERPL-MCNC: 178 MG/DL (ref 70–99)
HCT VFR BLD AUTO: 29.5 % (ref 40–53)
HGB BLD-MCNC: 9.1 G/DL (ref 13.3–17.7)
MAGNESIUM SERPL-MCNC: 2.1 MG/DL (ref 1.6–2.3)
MCH RBC QN AUTO: 30.4 PG (ref 26.5–33)
MCHC RBC AUTO-ENTMCNC: 30.8 G/DL (ref 31.5–36.5)
MCV RBC AUTO: 99 FL (ref 78–100)
PHOSPHATE SERPL-MCNC: 3 MG/DL (ref 2.5–4.5)
PLATELET # BLD AUTO: 201 10E9/L (ref 150–450)
POTASSIUM SERPL-SCNC: 3.3 MMOL/L (ref 3.4–5.3)
POTASSIUM SERPL-SCNC: 4 MMOL/L (ref 3.4–5.3)
RBC # BLD AUTO: 2.99 10E12/L (ref 4.4–5.9)
SODIUM SERPL-SCNC: 143 MMOL/L (ref 133–144)
WBC # BLD AUTO: 6.4 10E9/L (ref 4–11)

## 2017-11-02 PROCEDURE — S0032 INJECTION, NAFCILLIN SODIUM: HCPCS | Performed by: THORACIC SURGERY (CARDIOTHORACIC VASCULAR SURGERY)

## 2017-11-02 PROCEDURE — 83735 ASSAY OF MAGNESIUM: CPT | Performed by: ORTHOPAEDIC SURGERY

## 2017-11-02 PROCEDURE — 27210995 ZZH RX 272

## 2017-11-02 PROCEDURE — 94150 VITAL CAPACITY TEST: CPT

## 2017-11-02 PROCEDURE — 25000132 ZZH RX MED GY IP 250 OP 250 PS 637: Performed by: ANESTHESIOLOGY

## 2017-11-02 PROCEDURE — 97110 THERAPEUTIC EXERCISES: CPT | Mod: GP

## 2017-11-02 PROCEDURE — 25000132 ZZH RX MED GY IP 250 OP 250 PS 637: Performed by: THORACIC SURGERY (CARDIOTHORACIC VASCULAR SURGERY)

## 2017-11-02 PROCEDURE — 25000132 ZZH RX MED GY IP 250 OP 250 PS 637: Performed by: STUDENT IN AN ORGANIZED HEALTH CARE EDUCATION/TRAINING PROGRAM

## 2017-11-02 PROCEDURE — 20000004 ZZH R&B ICU UMMC

## 2017-11-02 PROCEDURE — 27210913 ZZH NUTRITION PRODUCT INTERMEDIATE PACKET

## 2017-11-02 PROCEDURE — 97530 THERAPEUTIC ACTIVITIES: CPT | Mod: GP

## 2017-11-02 PROCEDURE — 00000146 ZZHCL STATISTIC GLUCOSE BY METER IP

## 2017-11-02 PROCEDURE — 84100 ASSAY OF PHOSPHORUS: CPT | Performed by: ORTHOPAEDIC SURGERY

## 2017-11-02 PROCEDURE — 40000275 ZZH STATISTIC RCP TIME EA 10 MIN

## 2017-11-02 PROCEDURE — 40000014 ZZH STATISTIC ARTERIAL MONITORING DAILY

## 2017-11-02 PROCEDURE — 84132 ASSAY OF SERUM POTASSIUM: CPT | Performed by: ORTHOPAEDIC SURGERY

## 2017-11-02 PROCEDURE — 25000125 ZZHC RX 250: Performed by: THORACIC SURGERY (CARDIOTHORACIC VASCULAR SURGERY)

## 2017-11-02 PROCEDURE — 25000128 H RX IP 250 OP 636: Performed by: ANESTHESIOLOGY

## 2017-11-02 PROCEDURE — 25000128 H RX IP 250 OP 636: Performed by: THORACIC SURGERY (CARDIOTHORACIC VASCULAR SURGERY)

## 2017-11-02 PROCEDURE — 25000132 ZZH RX MED GY IP 250 OP 250 PS 637: Performed by: SURGERY

## 2017-11-02 PROCEDURE — 27210429 ZZH NUTRITION PRODUCT INTERMEDIATE LITER

## 2017-11-02 PROCEDURE — 80048 BASIC METABOLIC PNL TOTAL CA: CPT | Performed by: ORTHOPAEDIC SURGERY

## 2017-11-02 PROCEDURE — 40000047 ZZH STATISTIC CTO2 CONT OXYGEN TECH TIME EA 90 MIN

## 2017-11-02 PROCEDURE — 85027 COMPLETE CBC AUTOMATED: CPT | Performed by: ORTHOPAEDIC SURGERY

## 2017-11-02 PROCEDURE — 40000193 ZZH STATISTIC PT WARD VISIT

## 2017-11-02 RX ORDER — SODIUM CHLORIDE 450 MG/100ML
INJECTION, SOLUTION INTRAVENOUS
Status: COMPLETED
Start: 2017-11-02 | End: 2017-11-02

## 2017-11-02 RX ORDER — HYDROCHLOROTHIAZIDE 25 MG/1
25 TABLET ORAL DAILY
Status: DISCONTINUED | OUTPATIENT
Start: 2017-11-02 | End: 2017-11-04 | Stop reason: HOSPADM

## 2017-11-02 RX ADMIN — POTASSIUM CHLORIDE 20 MEQ: 1.5 POWDER, FOR SOLUTION ORAL at 06:25

## 2017-11-02 RX ADMIN — ASPIRIN 81 MG CHEWABLE TABLET 81 MG: 81 TABLET CHEWABLE at 08:28

## 2017-11-02 RX ADMIN — Medication 300 MG: at 20:00

## 2017-11-02 RX ADMIN — INSULIN ASPART 1 UNITS: 100 INJECTION, SOLUTION INTRAVENOUS; SUBCUTANEOUS at 09:34

## 2017-11-02 RX ADMIN — NYSTATIN 500000 UNITS: 100000 SUSPENSION ORAL at 02:24

## 2017-11-02 RX ADMIN — MULTIVITAMIN 15 ML: LIQUID ORAL at 08:28

## 2017-11-02 RX ADMIN — OXYCODONE HYDROCHLORIDE 5 MG: 5 TABLET ORAL at 15:30

## 2017-11-02 RX ADMIN — ACETAMINOPHEN 975 MG: 325 SOLUTION ORAL at 23:09

## 2017-11-02 RX ADMIN — INSULIN ASPART 1 UNITS: 100 INJECTION, SOLUTION INTRAVENOUS; SUBCUTANEOUS at 03:35

## 2017-11-02 RX ADMIN — HEPARIN SODIUM 5000 UNITS: 5000 INJECTION, SOLUTION INTRAVENOUS; SUBCUTANEOUS at 11:53

## 2017-11-02 RX ADMIN — QUETIAPINE FUMARATE 150 MG: 50 TABLET, FILM COATED ORAL at 08:28

## 2017-11-02 RX ADMIN — NYSTATIN 500000 UNITS: 100000 SUSPENSION ORAL at 13:23

## 2017-11-02 RX ADMIN — Medication 1 PACKET: at 08:33

## 2017-11-02 RX ADMIN — POTASSIUM CHLORIDE 40 MEQ: 1.5 POWDER, FOR SOLUTION ORAL at 04:41

## 2017-11-02 RX ADMIN — ACETAMINOPHEN 975 MG: 325 SOLUTION ORAL at 15:30

## 2017-11-02 RX ADMIN — NAFCILLIN: 10 INJECTION, POWDER, FOR SOLUTION INTRAVENOUS at 00:06

## 2017-11-02 RX ADMIN — QUETIAPINE FUMARATE 150 MG: 50 TABLET, FILM COATED ORAL at 20:00

## 2017-11-02 RX ADMIN — NYSTATIN 500000 UNITS: 100000 SUSPENSION ORAL at 08:28

## 2017-11-02 RX ADMIN — HEPARIN SODIUM 5000 UNITS: 5000 INJECTION, SOLUTION INTRAVENOUS; SUBCUTANEOUS at 03:35

## 2017-11-02 RX ADMIN — HEPARIN SODIUM 5000 UNITS: 5000 INJECTION, SOLUTION INTRAVENOUS; SUBCUTANEOUS at 20:00

## 2017-11-02 RX ADMIN — SODIUM CHLORIDE 500 ML: 4.5 INJECTION, SOLUTION INTRAVENOUS at 22:32

## 2017-11-02 RX ADMIN — ACETAMINOPHEN 975 MG: 325 SOLUTION ORAL at 08:28

## 2017-11-02 RX ADMIN — Medication 300 MG: at 08:30

## 2017-11-02 RX ADMIN — LEVOFLOXACIN 750 MG: 5 INJECTION, SOLUTION INTRAVENOUS at 11:53

## 2017-11-02 RX ADMIN — NYSTATIN 500000 UNITS: 100000 SUSPENSION ORAL at 20:00

## 2017-11-02 RX ADMIN — Medication 1 PACKET: at 13:24

## 2017-11-02 RX ADMIN — Medication 1 PACKET: at 20:00

## 2017-11-02 RX ADMIN — LABETALOL HCL 200 MG: 200 TABLET, FILM COATED ORAL at 08:29

## 2017-11-02 RX ADMIN — OXYCODONE HYDROCHLORIDE 5 MG: 5 TABLET ORAL at 05:31

## 2017-11-02 RX ADMIN — NAFCILLIN: 10 INJECTION, POWDER, FOR SOLUTION INTRAVENOUS at 23:09

## 2017-11-02 RX ADMIN — FAMOTIDINE 20 MG: 40 POWDER, FOR SUSPENSION ORAL at 08:30

## 2017-11-02 RX ADMIN — Medication 1 PACKET: at 08:31

## 2017-11-02 RX ADMIN — Medication: at 15:33

## 2017-11-02 RX ADMIN — HYDROCHLOROTHIAZIDE 25 MG: 25 TABLET ORAL at 10:58

## 2017-11-02 RX ADMIN — NAFCILLIN: 10 INJECTION, POWDER, FOR SOLUTION INTRAVENOUS at 20:00

## 2017-11-02 RX ADMIN — Medication: at 09:25

## 2017-11-02 RX ADMIN — NAFCILLIN: 10 INJECTION, POWDER, FOR SOLUTION INTRAVENOUS at 11:42

## 2017-11-02 RX ADMIN — NAFCILLIN: 10 INJECTION, POWDER, FOR SOLUTION INTRAVENOUS at 09:25

## 2017-11-02 RX ADMIN — NAFCILLIN: 10 INJECTION, POWDER, FOR SOLUTION INTRAVENOUS at 03:33

## 2017-11-02 RX ADMIN — QUETIAPINE FUMARATE 150 MG: 50 TABLET, FILM COATED ORAL at 13:23

## 2017-11-02 RX ADMIN — OXYCODONE HYDROCHLORIDE 5 MG: 5 TABLET ORAL at 06:25

## 2017-11-02 RX ADMIN — INSULIN ASPART 1 UNITS: 100 INJECTION, SOLUTION INTRAVENOUS; SUBCUTANEOUS at 11:43

## 2017-11-02 RX ADMIN — AMLODIPINE BESYLATE 5 MG: 5 TABLET ORAL at 08:28

## 2017-11-02 RX ADMIN — NAFCILLIN: 10 INJECTION, POWDER, FOR SOLUTION INTRAVENOUS at 15:43

## 2017-11-02 ASSESSMENT — VISUAL ACUITY
OU: GLASSES

## 2017-11-02 NOTE — PLAN OF CARE
Problem: Patient Care Overview  Goal: Plan of Care/Patient Progress Review  Discharge Planner PT   Patient plan for discharge: LTACH  Current status: Dependent for rolling today, dependent sling transfer supine > chair, AAROM/PROM provided to B UE/LE while sitting in chair. Increased lethargy today, but opening eyes on command. No antigravity movements to L UE/LE today when cued. Pt on 40% FiO2 via trach dome with HR 90s, BP stable   Barriers to return to prior living situation: medical status, home set-up  Recommendations for discharge: LTACH with continued PT  Rationale for recommendations: Pt is well below baseline for functional mobility and will require extensive rehab.        Entered by: Sadia Euceda 11/02/2017 4:46 PM

## 2017-11-02 NOTE — PROGRESS NOTES
CVTS Daily Note  11/2/2017  Attending: Ramesh Kuo, *    S:   Hypertensive overnight. BP meds readjusted yesterday, home HCTZ added this AM.  O:   Vitals:    11/02/17 0900 11/02/17 1000 11/02/17 1100 11/02/17 1200   BP:       BP Location:    Left leg   Pulse:       Resp: 17 14 14 15   Temp:    99.1  F (37.3  C)   TempSrc:    Axillary   SpO2: 99% 98% 98% 100%   Weight:       Height:         Vitals:    10/31/17 0000 11/01/17 0200 11/02/17 0400   Weight: 89 kg (196 lb 3.4 oz) 88.9 kg (195 lb 15.8 oz) 88.3 kg (194 lb 10.7 oz)     -4 kg since admit      Intake/Output Summary (Last 24 hours) at 11/02/17 1324  Last data filed at 11/02/17 1200   Gross per 24 hour   Intake          2381.01 ml   Output             3000 ml   Net          -618.99 ml       General: Trached, lightly sedated, periods of agitation with BP and HR elevation when discussing his care in his room.  Neck: Supple, no tracheal deviation  Cardiovascular: Regular tachy, RRR  Pumonary: Non labored breathing w/ PS.  CTAB   Abdomen: Soft, non distended, non tender.   Ext: Upper and lower extremity mild edema  Neuro: Small sluggishly reactive pupil bilaterally approximately 2mm - Left oculocephalic & LT Corneal abscent with mild intermittent nystagmus to the RT side.  Tracks.  Opens eyes to voice.  Nods appropriately to questions periodically.  Left arm moves, but without great motor control.  Left leg moves, but without great control.  Right shoulder shrug.       Labs:  [facility]  Recent Labs  Lab 11/02/17  1148 11/02/17  0324 11/01/17  0337 10/31/17  1800 10/31/17  0350 10/30/17  0423 10/29/17  0340   NA  --  143 144  --  144 146* 145*   POTASSIUM 4.0 3.3* 3.8 3.6 4.4 4.0 3.7   CHLORIDE  --  106 108  --  107 112* 111*   CO2  --  30 30  --  30 28 27   BUN  --  24 28  --  29 28 29   CR  --  1.31* 1.47*  --  1.41* 1.33* 1.29*   GLC  --  178* 164*  --  106* 164* 164*   MAG  --  2.1 2.2 2.2 2.2  --  2.2   PHOS  --  3.0 4.2  --  4.6*  --  3.6     CBC  Recent  Labs  Lab 11/02/17  0324 11/01/17  0337 10/31/17  0350 10/30/17  0423   WBC 6.4 5.9 6.5 6.5   HGB 9.1* 8.5* 8.4* 8.1*    217 229 247     INR/PTT  Recent Labs  Lab 10/30/17  0423   INR 1.07     ABGNo lab results found in last 7 days.  LFT  Recent Labs  Lab 11/01/17  0337   AST 28   ALT 52       GLUCOSE:     Recent Labs  Lab 11/02/17  1141 11/02/17  0327 11/02/17  0324 11/01/17  2353 11/01/17  2013 11/01/17  1642 11/01/17  1151  11/01/17  0337  10/31/17  0350  10/30/17  0423  10/29/17  0340  10/28/17  0336   GLC  --   --  178*  --   --   --   --   --  164*  --  106*  --  164*  --  164*  --  165*   * 158*  --  112* 134* 130* 127*  < >  --   < >  --   < >  --   < >  --   < >  --    < > = values in this interval not displayed.      A/P:   Cricket Miguel is a 62 year old with history of AVR and ascending aortic repair in May 2016 presented on 10/6 with acute mental status changes found to have large moises-aortic fluid collection and multiple strokes likely septic emboli.    Neuro: Tylenol scheduled for fevers,continue neuro checks Q2H. Increase seroquel to wean precedex. Okay to use ibuprofen for fevers if needed per neuro. MRI head and spine with interval changes as expected, no new findings.  Resp: Inability to protect airway requiring mechanical ventilation, on minimal vent settings, did well with pressure support. Trach placed 10/27, PS then trach dome as tolerated  CV: Goal normotension per neurology/neurosurgery. Timing of surgery tbd. ASA. scheduled labetalol 100 mg in AM. Add home HCTZ  GI/FEN: Tube feeds to goal, continue free water for hypernatremia (goal 145-155, go slow per neuro), PEG today  Renal: JOSE non-oliguric.  Endo: SSI  ID: Continue current antibiotic regimen pending ID recommendations (Nafcillin, levofloxacin, gentamycin, rifampin), daily blood cultures negative since 10/13. Added rifampin to abx per ID. Stopped daily cultures since they have been negative. WBC slowly decreasing.  PET scan done showed increased uptake around the heart and knee. OMFS consulted for tooth infection as per Dentistry. CT neck shows no abscess, cellulitis.   Heme: No bleeding concerns at this time, hold anticoagulation per neuro and neurosurgery  Prophylaxis: H2 blocker, heparin SC      Dispo: CVICU, LTAC to [facility] as soon as Precedex is weaned     Discussed with CVTS Fellow   Staff surgeons have been informed of changes through both  verbal and written communication.        Walter Bullock   Surgery PGY3  [phone]

## 2017-11-02 NOTE — PLAN OF CARE
Problem: Patient Care Overview  Goal: Plan of Care/Patient Progress Review  Outcome: No Change     Activity: Q2h turns.  Neuro: Unchanged. Tracks RN in room but does not follow finger. Squeezes hand of LUE on command, purposeful movement. Independently repositions LLE. Slight squeeze of right hand on command at times, otherwise withdraws to pain. RLE withdraws to pain. FRANCESCO orientation. Unable to wean Precedex d/t agitation/restlessness, tachycardia and hypertension.  CV: Afebrile. Sinus tach, HR 1teens. BP labile-PRN hydralazine required by previous RN.  Resp: #6 Shiley. Strong, productive cough, but requires suctioning d/t thick, white sputum. Coarse.  GI: Tolerating TF @ goal of 50 cc/hr with standard water flush. Rectal tube in place.  : Adequate UOP via condom cath.  Skin: Healing left knee scabs. More diaphoretic this AM.  Pain: No PRNs given.  Access: Right TL PICC. Left radial art line.  Gtts: Precedex weaned from .1-.5-unable to wean d/t agitation/restlessness, increasing HR and hypertension.  Labs: K 3.3-60 mEqu given.     Plan: Q2h neuro checks. Wean precedex as able. Recheck potassium at 1100.

## 2017-11-02 NOTE — PROGRESS NOTES
CV ICU Progress Note    POD: 3 Days Post-Op  LOS: 27    Admission History: Mr. Cricket Miguel is a 62 year old gentleman w/ h/o AVR and ascending aortic repair in 2016 admitted with acute mental status changes found to have large moises-aortic fluid collection and multiple strokes likely septic emboli.  MRI findings showed left posterior MCA, left PICA, and small right PCA infarcts.    S/Interval History:   Daytime hypertension continued yesterday.  Per patient's significant other, he had twice daily BP meds at home, but often only took them in the AM.  She also mentioned that he often had BPs >200 at home and was poorly controlled.  This may explain why he is so hypertensive during the day and not at night.  BP meds were adjusted including Labetalol 200 mg PO am, and Amlodipine 5 mg PO am.  A single dose of hydralazine 10 mg IV was given around midnight.  Precedex off this AM.  RN will call team if patient becomes agitated for single dose of Haldol.    Six beat run of V-tach early this AM that self resolved.      O:    Temp: 98.9  F (37.2  C) Temp  Min: 98.2  F (36.8  C)  Max: 98.9  F (37.2  C)  Resp: 14 Resp  Min: 11  Max: 24  SpO2: 98 % SpO2  Min: 97 %  Max: 100 %    No Data Recorded  Heart Rate: 100 Heart Rate  Min: 96  Max: 121  BP: (!) 236/121 Systolic (24hrs), Av , Min:236 , Max:236   Diastolic (24hrs), Av, Min:121, Max:121    Ventilation Mode: Trach collar  FiO2 (%): 40 %  PEEP (cm H2O): 7 cmH2O  Pressure Support (cm H2O): 7 cmH2O  Oxygen Concentration (%): 40 %  Resp: 14  Date 10/16/17 0700 - 10/17/17 0659   Shift 5023-2300 0895-6842 8665-4183 24 Hour Total   I  N  T  A  K  E   I.V. 114.6   114.6    NG/   400    Enteral 240   240    Shift Total  (mL/kg) 754.6  (8.34)   754.6  (8.34)   O  U  T  P  U  T   Urine 575   575    Shift Total  (mL/kg) 575  (6.35)   575  (6.35)   Weight (kg) 90.5 90.5 90.5 90.5         Past Medical History:   Diagnosis Date     AI (aortic insufficiency)      Aortic  root dilatation (H)      AR (aortic regurgitation)     severe     Cardiomyopathy (H)      CHF (congestive heart failure), NYHA class II (H)      COPD, mild (H)     spirometry 12/16     Heart murmur      Hyperlipidemia LDL goal <70 10/31/2010     Hypertension goal BP (blood pressure) < 140/90      Inguinal hernia      left lung nodule  1/29/2008     Major depressive disorder, single episode, moderate (H)      Psoriasis childhood     Tobacco use disorder        Past Surgical History:   Procedure Laterality Date     ARTHROSCOPY KNEE INCISION AND DRAINAGE Left 10/8/2017    Procedure: ARTHROSCOPY KNEE INCISION AND DRAINAGE;  Arthroscopic Incision and Drainage of Left Knee;  Surgeon: Lucretia Munoz MD;  Location: [facility] OR     HC SHOULDER ARTHROSCOPY, DX  2001    Arthroscopy, Shoulder RT Dr OSIRIS Andersen     HC SHOULDER ARTHROSCOPY, DX  1/04    LT arthroscopy Dr OSIRIS Andersen     HERNIA REPAIR, UMBILICAL  1/08    incarcerated - dr rockwell     HERNIORRHAPHY INGUINAL  12/21/2012    HERNIORRHAPHY INGUINAL;  Right Inguinal Hernia Repair with mesh ;  Surgeon: Mello Rockwell MD;  Location: [facility] OR     REPLACE VALVE AORTIC N/A 5/17/2016    REPLACE VALVE AORTIC - Dr Bowers     ROTATOR CUFF REPAIR RT/LT  11/10    Rt arthroscopy - Dr OSIRIS Andersen     SURGICAL HISTORY OF -   5/16    AVR, severe AR, aortic root repair     TRACHEOSTOMY PERCUTANEOUS  10/27/2017            Physical Exam    General: Trached, lightly sedated.  Neck: Supple, no tracheal deviation  Cardiovascular: Regular tachy, RRR  Pumonary: Non labored breathing w/ PS.  CTAB   Abdomen: Soft, non distended, non tender.   Ext: Upper and lower extremity mild edema  Neuro: Small sluggishly reactive pupil bilaterally approximately 2mm - Left oculocephalic & LT Corneal abscent with mild intermittent nystagmus to the RT side.  Tracks.  Opens eyes to voice.  Nods appropriately to questions periodically.  Left arm moves, but without great motor control.  Left leg moves, but without  great control.  Right shoulder shrug.    Lab Results   Component Value Date    WBC 6.4 11/02/2017    HGB 9.1 (L) 11/02/2017    HCT 29.5 (L) 11/02/2017     11/02/2017     11/02/2017    POTASSIUM 3.3 (L) 11/02/2017    CHLORIDE 106 11/02/2017    CO2 30 11/02/2017    BUN 24 11/02/2017    CR 1.31 (H) 11/02/2017     (H) 11/02/2017     (H) 10/23/2017    DD 1.7 (H) 04/25/2016    NTBNPI 2768 (H) 04/25/2016    NTBNP 1302 (H) 06/03/2016    TROPONIN <0.07 12/05/2005    TROPI 0.634 (HH) 10/08/2017    AST 28 11/01/2017    ALT 52 11/01/2017    ALKPHOS 85 10/06/2017    BILITOTAL 1.5 (H) 10/06/2017    INR 1.07 10/30/2017           IV fluid REPLACEMENT ONLY           Assessment and Plan: Mr. Cricket Miguel is a 62 year old gentleman w/ h/o AVR and ascending aortic repair in 5/2016 admitted with acute mental status changes found to have large moises-aortic fluid collection and multiple strokes likely septic emboli.  Concern for moises-infarct edema leading to expansion of the cerebellar infarct.    Neuro:     Multifocal CVA: L cerebellar, L MCA, and R occipital regions.  Concern for septic emboli.  Small microhemorrhages were observed.  Progressive edema noted.  Neurosurgery and neurocritical care are following.  Angio showed no evidence of mycotic aneurysms.  Neuro exam stable.    Neurocrit team comments:  From his stroke, expected deficit include some language difficulty, right and left confusion, some trouble with calculation, possible visual field cut, and coordination with gait imbalance. Motor weakness from the stroke should include right hemiparesis/hemiplegia. 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 Neurological prognosis is good with very slow recovery, which will take months.     Physical exam showing mild improvement.  Patient opens eyes and periodically nods appropriately  to questions, moves left upper and lower extremities but not with great control.      ASA 81mg    Heparin 5000 U sq q8 hours.      Acetaminophen, Ibuprofen prn    Oxycodone PRN    Precedex gtt off    Seroquel 150 mg NJ tid    If agitation develops, RN will call CVICU team and we can place a one time order for Haldol.  I would NOT put this in as PRN.    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  1st degree AVB.     Tagged WBC scan showed likely infectious etiology.    Surgical date will be scheduled after patient has shown stabilization and recovery from neurologic insult.    BP lability    Amlodipine 5 mg NJ in AM    HCTZ 25 mg NJ in AM (on hold this AM due to normal to hypotensive pressures)    Labetalol 200 mg NJ in am    Only scheduled once daily because BP is normal overnight and per significant other, patient only took meds at home in AM.    Hydralazine 10 mg IV x 1 last night    V-tach 11/02/17    Metoprolol 10/19 - 11/01/17    Labetalol 11/01/17  GI/FEN:     PEG placed 10/30/17.    TF @ goal  Renal:     Monitor electrolytes, Cr, and urine output     Lytes goals K>4 and Mag>2    JOSE:     Monitor Urine Output. Cr      Hypernatremia, resolved    FWF decreased to 30 q4    Goal Na+ 145-155 per NCC  Endo:    Glucose checks, SSI to serum glucose >80, <180 mg/dL  ID:     Sepsis: MSSA bacteremia with likely source of the L knee septic arthritis. Knee is s/p arthroscopic irrigation and debridement 10/8. Renal function has recovered.  Otherwise hemodynamically stable without pressors.      ID consultant recommended:    Continue Nafcillin & Rifampin a minimum of 3 months and even longer if pt hasn't had surgery by that point (i.e. Continue through surgery)    Continue Levofloxacin for 4 weeks from start of therapy (10/07-11/04)    Stop Gentamicin    F/U in ID clinic if discharged to LTAC      Nystatin (oral candidiasis)     PET scan 10/19 revealed likely infectious fluid around ascending aorta    Tooth Remnant, left lower canine    OMF o/b, rec continuing abx and outpatient f/u     Monitor WBC daily   Heme:     Hgb:  Stable  Prophylaxis: SCDs, Famotidine, ASA  Lines:      CVC 27 days    Espinoza 27 days    Trach 6 days      Dispo: Continue ICU care.  LTAC to [facility] possibly tomorrow    Code Status: Full Code    Jeramie Orellana DO  Anesthesiology Resident  CVICU Service, *[id number]    Patient seen and staffed with attending.

## 2017-11-02 NOTE — PROVIDER NOTIFICATION
0525: Patient went in to 6 beats of V. Tach while recovering after suction via trach. Was not perfusing per arterial line. Returned to sinus tach. AM Potassium 3.3-currently replacing.

## 2017-11-02 NOTE — PLAN OF CARE
Problem: Stroke (Ischemic) (Adult)  Goal: Signs and Symptoms of Listed Potential Problems Will be Absent, Minimized or Managed (Stroke)  Signs and symptoms of listed potential problems will be absent, minimized or managed by discharge/transition of care (reference Stroke (Ischemic) (Adult) CPG).   Outcome: Improving  Pt opens eyes spontaneously. Pretty lethargic this AM. Much more awake this afternoon and evening. Occasionally followed commands on left side. Sat in chair for several hours today. Precedex turned off this AM. Tolerating well. BP better managed today. CVTS notified several times about BP status. BP 160s/110-120s most of the day. HR  CVTS would like to be notified if BP sustained over 180. TF 50 @ goal. Rectal tube and condom cath. Good urine output. Hemorrhoids on around anus. See MAR for medication application. Family educated on BP, pain, and agitation goals. Education on LTAC and medications.      Plan: Continue to monitor. Possible discharge to LTAC tomorrow. Notify MD of any changes

## 2017-11-03 ENCOUNTER — APPOINTMENT (OUTPATIENT)
Dept: PHYSICAL THERAPY | Facility: CLINIC | Age: 62
DRG: 004 | End: 2017-11-03
Attending: PSYCHIATRY & NEUROLOGY
Payer: COMMERCIAL

## 2017-11-03 LAB
GLUCOSE BLDC GLUCOMTR-MCNC: 117 MG/DL (ref 70–99)
GLUCOSE BLDC GLUCOMTR-MCNC: 126 MG/DL (ref 70–99)
GLUCOSE BLDC GLUCOMTR-MCNC: 140 MG/DL (ref 70–99)
GLUCOSE BLDC GLUCOMTR-MCNC: 167 MG/DL (ref 70–99)
GLUCOSE BLDC GLUCOMTR-MCNC: 167 MG/DL (ref 70–99)
GLUCOSE BLDC GLUCOMTR-MCNC: 176 MG/DL (ref 70–99)
GLUCOSE BLDC GLUCOMTR-MCNC: 182 MG/DL (ref 70–99)

## 2017-11-03 PROCEDURE — 25000128 H RX IP 250 OP 636: Performed by: ANESTHESIOLOGY

## 2017-11-03 PROCEDURE — 25000128 H RX IP 250 OP 636: Performed by: THORACIC SURGERY (CARDIOTHORACIC VASCULAR SURGERY)

## 2017-11-03 PROCEDURE — 25000132 ZZH RX MED GY IP 250 OP 250 PS 637: Performed by: THORACIC SURGERY (CARDIOTHORACIC VASCULAR SURGERY)

## 2017-11-03 PROCEDURE — 25000132 ZZH RX MED GY IP 250 OP 250 PS 637: Performed by: ANESTHESIOLOGY

## 2017-11-03 PROCEDURE — 25000125 ZZHC RX 250: Performed by: THORACIC SURGERY (CARDIOTHORACIC VASCULAR SURGERY)

## 2017-11-03 PROCEDURE — 00000146 ZZHCL STATISTIC GLUCOSE BY METER IP

## 2017-11-03 PROCEDURE — S0032 INJECTION, NAFCILLIN SODIUM: HCPCS | Performed by: THORACIC SURGERY (CARDIOTHORACIC VASCULAR SURGERY)

## 2017-11-03 PROCEDURE — 40000047 ZZH STATISTIC CTO2 CONT OXYGEN TECH TIME EA 90 MIN

## 2017-11-03 PROCEDURE — 27210429 ZZH NUTRITION PRODUCT INTERMEDIATE LITER

## 2017-11-03 PROCEDURE — 25000132 ZZH RX MED GY IP 250 OP 250 PS 637: Performed by: STUDENT IN AN ORGANIZED HEALTH CARE EDUCATION/TRAINING PROGRAM

## 2017-11-03 PROCEDURE — 20000004 ZZH R&B ICU UMMC

## 2017-11-03 PROCEDURE — 97110 THERAPEUTIC EXERCISES: CPT | Mod: GP

## 2017-11-03 PROCEDURE — 40000275 ZZH STATISTIC RCP TIME EA 10 MIN

## 2017-11-03 PROCEDURE — 97530 THERAPEUTIC ACTIVITIES: CPT | Mod: GP

## 2017-11-03 PROCEDURE — 25000132 ZZH RX MED GY IP 250 OP 250 PS 637: Performed by: SURGERY

## 2017-11-03 PROCEDURE — 94640 AIRWAY INHALATION TREATMENT: CPT

## 2017-11-03 PROCEDURE — 40000193 ZZH STATISTIC PT WARD VISIT

## 2017-11-03 PROCEDURE — 27210913 ZZH NUTRITION PRODUCT INTERMEDIATE PACKET

## 2017-11-03 RX ORDER — HEPARIN SODIUM 5000 [USP'U]/.5ML
5000 INJECTION, SOLUTION INTRAVENOUS; SUBCUTANEOUS EVERY 8 HOURS
DISCHARGE
Start: 2017-11-03 | End: 2017-12-06

## 2017-11-03 RX ORDER — FAMOTIDINE 40 MG/5ML
20 POWDER, FOR SUSPENSION ORAL EVERY 24 HOURS
Qty: 75 ML | DISCHARGE
Start: 2017-11-03 | End: 2017-11-04

## 2017-11-03 RX ORDER — LABETALOL 200 MG/1
200 TABLET, FILM COATED ORAL EVERY 12 HOURS
Qty: 60 TABLET | DISCHARGE
Start: 2017-11-03 | End: 2017-12-06

## 2017-11-03 RX ORDER — AMINO ACIDS/PROTEIN HYDROLYS 11G-40/45
1 LIQUID IN PACKET (ML) ORAL DAILY
DISCHARGE
Start: 2017-11-03 | End: 2017-12-06

## 2017-11-03 RX ORDER — LEVOFLOXACIN 5 MG/ML
750 INJECTION, SOLUTION INTRAVENOUS EVERY 24 HOURS
Qty: 1000 ML | DISCHARGE
Start: 2017-11-03 | End: 2017-12-06

## 2017-11-03 RX ORDER — QUETIAPINE FUMARATE 50 MG/1
150 TABLET, FILM COATED ORAL 3 TIMES DAILY
Qty: 120 TABLET | DISCHARGE
Start: 2017-11-03 | End: 2017-12-06

## 2017-11-03 RX ORDER — POLYETHYLENE GLYCOL 3350 17 G/17G
17 POWDER, FOR SOLUTION ORAL DAILY PRN
Qty: 7 PACKET | Status: ON HOLD | DISCHARGE
Start: 2017-11-03 | End: 2018-01-03

## 2017-11-03 RX ORDER — ALBUTEROL SULFATE 90 UG/1
6 AEROSOL, METERED RESPIRATORY (INHALATION) EVERY 4 HOURS PRN
DISCHARGE
Start: 2017-11-03 | End: 2017-12-06

## 2017-11-03 RX ORDER — FAMOTIDINE 40 MG/5ML
20 POWDER, FOR SUSPENSION ORAL 2 TIMES DAILY
Status: DISCONTINUED | OUTPATIENT
Start: 2017-11-03 | End: 2017-11-04 | Stop reason: HOSPADM

## 2017-11-03 RX ORDER — GUAR GUM
1 PACKET (EA) ORAL 3 TIMES DAILY
DISCHARGE
Start: 2017-11-03 | End: 2018-01-09

## 2017-11-03 RX ORDER — ASPIRIN 81 MG/1
81 TABLET, CHEWABLE ORAL DAILY
Qty: 36 TABLET | Status: ON HOLD | DISCHARGE
Start: 2017-11-03 | End: 2018-01-03

## 2017-11-03 RX ORDER — LABETALOL 200 MG/1
200 TABLET, FILM COATED ORAL EVERY 12 HOURS SCHEDULED
Status: DISCONTINUED | OUTPATIENT
Start: 2017-11-03 | End: 2017-11-04 | Stop reason: HOSPADM

## 2017-11-03 RX ADMIN — NAFCILLIN: 10 INJECTION, POWDER, FOR SOLUTION INTRAVENOUS at 03:27

## 2017-11-03 RX ADMIN — AMLODIPINE BESYLATE 5 MG: 5 TABLET ORAL at 08:08

## 2017-11-03 RX ADMIN — NAFCILLIN: 10 INJECTION, POWDER, FOR SOLUTION INTRAVENOUS at 23:48

## 2017-11-03 RX ADMIN — ACETAMINOPHEN 975 MG: 325 SOLUTION ORAL at 15:46

## 2017-11-03 RX ADMIN — NYSTATIN 500000 UNITS: 100000 SUSPENSION ORAL at 08:08

## 2017-11-03 RX ADMIN — ACETAMINOPHEN 975 MG: 325 SOLUTION ORAL at 08:07

## 2017-11-03 RX ADMIN — INSULIN ASPART 1 UNITS: 100 INJECTION, SOLUTION INTRAVENOUS; SUBCUTANEOUS at 08:38

## 2017-11-03 RX ADMIN — INSULIN ASPART 1 UNITS: 100 INJECTION, SOLUTION INTRAVENOUS; SUBCUTANEOUS at 00:00

## 2017-11-03 RX ADMIN — ASPIRIN 81 MG CHEWABLE TABLET 81 MG: 81 TABLET CHEWABLE at 08:08

## 2017-11-03 RX ADMIN — INSULIN ASPART 1 UNITS: 100 INJECTION, SOLUTION INTRAVENOUS; SUBCUTANEOUS at 03:25

## 2017-11-03 RX ADMIN — HYDROCHLOROTHIAZIDE 25 MG: 25 TABLET ORAL at 08:08

## 2017-11-03 RX ADMIN — NAFCILLIN: 10 INJECTION, POWDER, FOR SOLUTION INTRAVENOUS at 15:46

## 2017-11-03 RX ADMIN — NYSTATIN 500000 UNITS: 100000 SUSPENSION ORAL at 03:24

## 2017-11-03 RX ADMIN — Medication: at 08:09

## 2017-11-03 RX ADMIN — Medication 1 PACKET: at 15:47

## 2017-11-03 RX ADMIN — LEVOFLOXACIN 750 MG: 5 INJECTION, SOLUTION INTRAVENOUS at 10:30

## 2017-11-03 RX ADMIN — Medication 1 PACKET: at 08:10

## 2017-11-03 RX ADMIN — HEPARIN SODIUM 5000 UNITS: 5000 INJECTION, SOLUTION INTRAVENOUS; SUBCUTANEOUS at 20:35

## 2017-11-03 RX ADMIN — FAMOTIDINE 20 MG: 40 POWDER, FOR SUSPENSION ORAL at 08:08

## 2017-11-03 RX ADMIN — NYSTATIN 500000 UNITS: 100000 SUSPENSION ORAL at 20:36

## 2017-11-03 RX ADMIN — Medication 300 MG: at 08:09

## 2017-11-03 RX ADMIN — HEPARIN SODIUM 5000 UNITS: 5000 INJECTION, SOLUTION INTRAVENOUS; SUBCUTANEOUS at 12:32

## 2017-11-03 RX ADMIN — ACETAMINOPHEN 975 MG: 325 SOLUTION ORAL at 23:38

## 2017-11-03 RX ADMIN — MULTIVITAMIN 15 ML: LIQUID ORAL at 08:07

## 2017-11-03 RX ADMIN — Medication 1 PACKET: at 20:37

## 2017-11-03 RX ADMIN — QUETIAPINE FUMARATE 150 MG: 50 TABLET, FILM COATED ORAL at 15:46

## 2017-11-03 RX ADMIN — Medication: at 16:00

## 2017-11-03 RX ADMIN — NAFCILLIN: 10 INJECTION, POWDER, FOR SOLUTION INTRAVENOUS at 08:42

## 2017-11-03 RX ADMIN — Medication 300 MG: at 20:37

## 2017-11-03 RX ADMIN — FAMOTIDINE 20 MG: 40 POWDER, FOR SUSPENSION ORAL at 20:36

## 2017-11-03 RX ADMIN — OXYCODONE HYDROCHLORIDE 5 MG: 5 TABLET ORAL at 23:39

## 2017-11-03 RX ADMIN — NAFCILLIN: 10 INJECTION, POWDER, FOR SOLUTION INTRAVENOUS at 12:32

## 2017-11-03 RX ADMIN — NAFCILLIN: 10 INJECTION, POWDER, FOR SOLUTION INTRAVENOUS at 20:35

## 2017-11-03 RX ADMIN — OXYCODONE HYDROCHLORIDE 5 MG: 5 TABLET ORAL at 07:01

## 2017-11-03 RX ADMIN — QUETIAPINE FUMARATE 150 MG: 50 TABLET, FILM COATED ORAL at 08:08

## 2017-11-03 RX ADMIN — LABETALOL HCL 200 MG: 200 TABLET, FILM COATED ORAL at 08:08

## 2017-11-03 RX ADMIN — LABETALOL HCL 200 MG: 200 TABLET, FILM COATED ORAL at 20:35

## 2017-11-03 RX ADMIN — HEPARIN SODIUM 5000 UNITS: 5000 INJECTION, SOLUTION INTRAVENOUS; SUBCUTANEOUS at 03:24

## 2017-11-03 RX ADMIN — QUETIAPINE FUMARATE 150 MG: 50 TABLET, FILM COATED ORAL at 20:35

## 2017-11-03 RX ADMIN — INSULIN ASPART 1 UNITS: 100 INJECTION, SOLUTION INTRAVENOUS; SUBCUTANEOUS at 12:32

## 2017-11-03 RX ADMIN — ALBUTEROL SULFATE 2.5 MG: 2.5 SOLUTION RESPIRATORY (INHALATION) at 23:49

## 2017-11-03 ASSESSMENT — VISUAL ACUITY
OU: GLASSES

## 2017-11-03 NOTE — DISCHARGE SUMMARY
CVTS Surgery Discharge Summary    Cricket Miguel MRN# [id number]   YOB: 1955 Age: 62 year old     Date of Admission:  10/6/2017  Date of Discharge::  11/5/2017  Admitting Physician:  Ramesh Kuo MD  Discharge Physician:  Jeramie Orellana DO  Primary Care Physician:        Dipak Gordillo          Admission Diagnoses:   NSTEMI (non-ST elevated myocardial infarction) (H) [I21.4]  Sepsis due to other etiology (H) [A41.89]  Hypotension, unspecified hypotension type [I95.9]  Dissection of aorta, unspecified portion of aorta (H) [I71.00]  Chronic Essential hypertension, uncontrolled  COPD, chronic  Chronic congestive heart failure with preserved ejection fraction            Discharge Diagnosis:   NSTEMI (non-ST elevated myocardial infarction) (H) [I21.4]  Sepsis due to other etiology (H) [A41.89]  Hypotension, unspecified hypotension type [I95.9]  Dissection of aorta, unspecified portion of aorta (H) [I71.00]  Chronic Essential hypertension, uncontrolled  COPD, chronic  Chronic congestive heart failure with preserved ejection fraction  Embolic stroke involving the left cerebellar, left middle cerebral, and right occipital regions.  Right-sided hemiplegia with left-sided weakness.  Aphasia   Acute Hypernatremia, resolved  Acute kidney injury, resolved  Acute ICU Delerium  Dental carries            Procedures:   Percutaneous Tracheostomy Tube        Non-operative procedures:   Percutaneous gastric tube (PEG)          Consultations:   ORTHOPAEDIC SURGERY ADULT/PEDS IP CONSULT  INFECTIOUS DISEASE GENERAL ADULT IP CONSULT  NEUROLOGY GENERAL ADULT IP CONSULT  PHARMACY IP CONSULT  NUTRITION SERVICES ADULT IP CONSULT  PHYSICAL THERAPY ADULT IP CONSULT  OCCUPATIONAL THERAPY ADULT IP CONSULT  RESPIRATORY CARE IP CONSULT  PHARMACY TO DOSE VANCO  PHARMACY TO DOSE GENTAMICIN  NUTRITION SERVICES ADULT IP CONSULT  PHARMACY IP CONSULT  ANESTHESIOLOGY IP CONSULT  NUTRITION SERVICES ADULT IP CONSULT  NUTRITION  SERVICES ADULT IP CONSULT  NUTRITION SERVICES ADULT IP CONSULT  SOCIAL WORK IP CONSULT  PATIENT LEARNING CENTER IP CONSULT  DENTISTRY ADULT IP CONSULT  VASCULAR ACCESS ADULT IP CONSULT  VASCULAR ACCESS ADULT IP CONSULT  ORAL MAXILLOFACIAL SURGERY ADULT IP CONSULT  CARE COORDINATOR IP CONSULT  PHYSICAL THERAPY ADULT IP CONSULT  SURGERY GENERAL ADULT IP CONSULT  PHYSICAL THERAPY ADULT IP CONSULT  OCCUPATIONAL THERAPY ADULT IP CONSULT  SPEECH LANGUAGE PATH ADULT IP CONSULT           Medications Prior to Admission:     Prescriptions Prior to Admission   Medication Sig Dispense Refill Last Dose     hydrochlorothiazide (HYDRODIURIL) 25 MG tablet Take 1 tablet (25 mg) by mouth daily 90 tablet 1 Unknown at Unknown time     atorvastatin (LIPITOR) 40 MG tablet Take 1 tablet (40 mg) by mouth daily 90 tablet 1 Unknown at Unknown time     amLODIPine (NORVASC) 5 MG tablet Take 1 tablet (5 mg) by mouth 2 times daily 180 tablet 3 Unknown at Unknown time     [DISCONTINUED] furosemide (LASIX) 20 MG tablet Take 1 tablet (20 mg) by mouth daily as needed If blood pressure above 130 90 tablet 3 Unknown at Unknown time     [DISCONTINUED] carvedilol (COREG) 12.5 MG tablet Take 1 tablet (12.5 mg) by mouth 2 times daily (with meals) 180 tablet 3 Unknown at Unknown time     [DISCONTINUED] aspirin  MG tablet Take 1 tablet (325 mg) by mouth daily 60 tablet 3 Unknown at Unknown time            Discharge Medications:        Review of your medicines      START taking       Dose / Directions    acetaminophen 32 mg/mL solution   Commonly known as:  TYLENOL   Used for:  Tracheostomy in place (H)        Dose:  650 mg   20.3 mLs (650 mg) by Oral or Feeding Tube route every 6 hours as needed for fever or mild pain   Quantity:  400 mL   Refills:  0       albuterol 108 (90 BASE) MCG/ACT Inhaler   Commonly known as:  PROAIR HFA/PROVENTIL HFA/VENTOLIN HFA   Used for:  Tracheostomy in place (H)        Dose:  6 puff   Inhale 6 puffs into the lungs every  4 hours as needed for shortness of breath / dyspnea   Refills:  0       aspirin 81 MG chewable tablet   Used for:  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)   Replaces:  aspirin  MG EC tablet        Dose:  81 mg   1 tablet (81 mg) by Oral or Feeding Tube route daily   Quantity:  36 tablet   Refills:  0       D5W 50 mL with nafcillin 2 g infusion   Used for:  H/O aortic root repair        Dose:  2 g   Inject 2 g into the vein every 4 hours   Refills:  0       famotidine 40 MG/5ML suspension   Commonly known as:  PEPCID   Used for:  Tracheostomy in place (H)        Dose:  20 mg   Take 2.5 mLs (20 mg) by mouth 2 times daily   Quantity:  75 mL   Refills:  0       fiber modular packet   Used for:  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)        Dose:  1 packet   1 packet by Per Feeding Tube route 3 times daily   Refills:  0       heparin sodium PF 5000 UNIT/0.5ML injection   Used for:  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)        Dose:  5000 Units   Inject 0.5 mLs (5,000 Units) Subcutaneous every 8 hours   Refills:  0       labetalol 200 MG tablet   Commonly known as:  NORMODYNE   Used for:  Hypertension goal BP (blood pressure) < 140/90        Dose:  200 mg   Take 1 tablet (200 mg) by mouth every 12 hours   Quantity:  60 tablet   Refills:  0       levofloxacin 750 MG/150ML infusion   Commonly known as:  LEVAQUIN   Indication:  Bacteria in the Blood   Used for:  H/O aortic root repair        Dose:  750 mg   Inject 150 mLs (750 mg) into the vein every 24 hours   Quantity:  1000 mL   Refills:  0       multivitamins with minerals Liqd liquid   Used for:  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)        Dose:  15 mL   15 mLs by Per Feeding Tube route daily   Refills:  0       oxyCODONE IR 5 MG tablet   Commonly known as:  ROXICODONE   Used for:  Tracheostomy in place (H)        Dose:  5-10 mg   Take 1-2 tablets (5-10 mg) by mouth every  4 hours as needed for moderate to severe pain   Quantity:  18 tablet   Refills:  0       polyethylene glycol Packet   Commonly known as:  MIRALAX/GLYCOLAX   Used for:  Drug-induced constipation        Dose:  17 g   Take 17 g by mouth daily as needed for constipation   Quantity:  7 packet   Refills:  0       protein modular   Used for:  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)        Dose:  1 packet   1 packet by Per Feeding Tube route daily   Refills:  0       QUEtiapine 50 MG tablet   Commonly known as:  SEROquel   Used for:  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)        Dose:  150 mg   3 tablets (150 mg) by Oral or Feeding Tube route 3 times daily   Quantity:  120 tablet   Refills:  0       rifampin 25 mg/mL Susp   Commonly known as:  REIFADEN   Indication:  Abscess   Used for:  H/O aortic root repair        Dose:  300 mg   12 mLs (300 mg) by Oral or Feeding Tube route 2 times daily   Refills:  0         CONTINUE these medicines which have NOT CHANGED       Dose / Directions    amLODIPine 5 MG tablet   Commonly known as:  NORVASC   Used for:  S/P AVR, Essential hypertension        Dose:  5 mg   Take 1 tablet (5 mg) by mouth 2 times daily   Quantity:  180 tablet   Refills:  3       atorvastatin 40 MG tablet   Commonly known as:  LIPITOR   Used for:  Vascular calcification        Dose:  40 mg   Take 1 tablet (40 mg) by mouth daily   Quantity:  90 tablet   Refills:  1       hydrochlorothiazide 25 MG tablet   Commonly known as:  HYDRODIURIL   Used for:  HTN (hypertension)        Dose:  25 mg   Take 1 tablet (25 mg) by mouth daily   Quantity:  90 tablet   Refills:  1         STOP taking          aspirin  MG EC tablet   Replaced by:  aspirin 81 MG chewable tablet           carvedilol 12.5 MG tablet   Commonly known as:  COREG           furosemide 20 MG tablet   Commonly known as:  LASIX                Where to get your medicines      Some of these will need a paper  "prescription and others can be bought over the counter. Ask your nurse if you have questions.     You don't need a prescription for these medications      acetaminophen 32 mg/mL solution     albuterol 108 (90 BASE) MCG/ACT Inhaler     aspirin 81 MG chewable tablet     D5W 50 mL with nafcillin 2 g infusion     famotidine 40 MG/5ML suspension     fiber modular packet     heparin sodium PF 5000 UNIT/0.5ML injection     labetalol 200 MG tablet     levofloxacin 750 MG/150ML infusion     multivitamins with minerals Liqd liquid     polyethylene glycol Packet     protein modular     QUEtiapine 50 MG tablet     rifampin 25 mg/mL Susp         Information about where to get these medications is not yet available     ! Ask your nurse or doctor about these medications      oxyCODONE IR 5 MG tablet                   Day of Discharge Exam   BP 98/79  Pulse 82  Temp 97.9  F (36.6  C) (Axillary)  Resp 12  Ht 1.727 m (5' 8\")  Wt 88.3 kg (194 lb 10.7 oz)  SpO2 99%  BMI 29.6 kg/m2  General: Awake, alert, NAD  Cardio: RRR  Chest: NLB on RA  Abd: Soft, non-distended  Ext: WWP          Brief History of Illness:   Mr Miguel is a 62 year old male with a PMH of aortic stenosis with ascending aortic aneurysm/CAD s/p aortic valve replacement and aortic root replacement with composite graft (April 2016), HFpEF, COPD, HTN, HLD who presented to the [facility] on 10/06/17 with altered mental status.  He was found to have a large moises-aortic abscess around his replaced root & valve, and multiple strokes from septic emboli from the aorta.           Hospital Course:     His strokes primarily involved the left cerebellum, left middle cerebral artery, and right occiput.  His abscess was deemed non-operable by CVTS given his acute neurologic insult.  CVTS and Neurocritical Care agreed he would be best optimized by allowing his neurological insult to correct/improve prior to attempting cardiothoracic surgery.  Infectious disease was " consulted for the abscess, who recommended treatment of the MSSA+ abscess with Levofloxacin for 4 weeks (end date 11/04/17), and Nafcillin & Rifampin continued until he has cardiothoracic surgery.  The source of his infection was unknown but thought possibly to be due to a recent left knee effusion or his poor dentition.  He developed an acute kidney injury, which corrected with medical management and did not require dialysis.  He had a percutaneous tracheostomy placed on 10/27/17.  At time of discharge, he was using a trach dome continuously for ventilation and did not require ventilatory support.  He had a percutaneous endoscopic gastrostomy tube placed on 10/30/17.  He was at goal tube feeds by time of his discharge.    He developed significant hypertension (SBP>200) throughout his hospital stay, and at times had periods of hypotension.  Per his significant other, the patient had a previous history of hypertension for which he was not compliant with his twice daily medications and often only took them in the AM.  She also noted that he often had BPs at home >200.  His medication was optimized prior to discharge.           On 11/02/17, they were felt to meet discharge criteria and were discharged to [facility] with appropriate instructions and follow up.  They were tolerating a tube feed diet, had full return of bowel and bladder function, and were trach doming daily and participating in PT.  The patient acknowledged understanding and were in agreement with the plan.         Antibiotics Prescribed at Discharge:   Levofloxacin 750 mg IV q 24 hours until 11/04/17  Nafcillin 2 gm in d5w 50 mL IV q4 hours until patient has aortic root surgery.  Minimum 3 months (01/07/18)  Rifampin suspension 300 mg per feeding tube 2 times daily until patient has aortic root surgery.  Minimum 3 months (01/07/18)           Final Pathology Result:   Blood Culture 10/10/17:  Culture & Susceptibility      STAPHYLOCOCCUS AUREUS       Antibiotic Interpretation Sensitivity Unit Method Status     CIPROFLOXACIN Sensitive <=0.5 ug/mL GLENNA Preliminary     CLINDAMYCIN Sensitive <=0.25 ug/mL GLENNA Preliminary     ERYTHROMYCIN Sensitive <=0.25 ug/mL GLENNA Preliminary     GENTAMICIN Sensitive <=0.5 ug/mL GLENNA Preliminary     LEVOFLOXACIN Sensitive 0.25 ug/mL GLENNA Preliminary     OXACILLIN Sensitive 0.5 ug/mL GLENNA Preliminary     PENICILLIN Resistant >=0.5 ug/mL GLENNA Preliminary     TETRACYCLINE Resistant >=16 ug/mL GLENNA Preliminary     Trimethoprim/Sulfa Sensitive <=0.5/9.5 ug/mL GLENNA Preliminary     VANCOMYCIN Sensitive 1 ug/mL GLENNA Preliminary                 Discharge Instructions and Follow-Up:     Discharge Procedure Orders  Mantoux instructions   Order Comments: Give two-step Mantoux (PPD) Per Facility Policy Yes     General info for SNF   Order Comments: Length of Stay Estimate: Long Term Care  Condition at Discharge: Stable  Level of care:skilled   Rehabilitation Potential: Fair  Admission H&P remains valid and up-to-date: Yes  Recent Chemotherapy: N/A  Use Nursing Home Standing Orders: Yes     Bladder scan   Order Comments: X 2 for post void residual     Intake and output   Order Comments: Every shift     Daily weights   Order Comments: Call Provider for weight gain of more than 2 pounds per day or 5 pounds per week.     Activity - Up with nursing assistance   Order Specific Question Answer Comments   Is discharge order? Yes      Follow Up ([facility])   Order Comments: Follow up with CV surgery , at (location with clinic name or city) [facility], within 3 months  to evaluate treatment change. No follow up labs or test are needed. Follow up sooner if needed.    Appointments on [place] and/or [facility] (with [facility] or [facility] provider or service). Call [phone] if you haven't heard regarding these appointments within 7 days of discharge.     Physical Therapy Adult Consult   Order Comments: Evaluate and treat as clinically indicated.    Reason:   Deconditioning, stroke     Occupational Therapy Adult Consult   Order Comments: Evaluate and treat as clinically indicated.    Reason:  Deconditioning, stroke     Speech Language Path Adult Consult   Order Comments: Evaluate and treat as clinically indicated.    Reason:  Stroke, trach     Adult Formula Bolus Feeding   Order Comments: Specify: TwoCal HN @ 50 mL/hr + 1 packet ProSource daily to provide total 2440 kcal (85% MREE or 33 kcal/kg) and 112 g PRO (1.5 g/kg)     Oxygen - Trach dome   Order Comments: With humidity to keep O2 sats % >  92     Advance Diet as Tolerated   Order Comments: Follow this diet upon discharge: Orders Placed This Encounter     Adult Formula Drip Feeding: Continuous TwoCal HN; Gastrostomy; Goal Rate: 50; mL/hr; Medication - Tube Feeding Flush Frequency: At least 15-30 mL water before and after medication administration and with tube clogging; Amout to Send (Nutrition use...     NPO per Anesthesia Guidelines for Procedure/Surgery Except for: Meds   Order Specific Question Answer Comments   Is discharge order? Yes                Home Health Care:   N/A- discharged to rehab facility           Discharge Disposition:   Discharged to long-term care facility      Condition at discharge: Catracho Bullock   Surgery PGY3  [phone]      Patient seen and staffed with attending.

## 2017-11-03 NOTE — PLAN OF CARE
Problem: Patient Care Overview  Goal: Plan of Care/Patient Progress Review  Discharge Planner PT   Patient plan for discharge: LTACH  Current status: Engaged pt in rolling max A, dependent lift supine > EOB > chair, dangled EOB ~3 minutes within sling at max A, positioned in chair. Pt waking up to voice but only intermittently moving L side on command. Pt on trach dome 40% FiO2 with sats >96% and BP stable throughout session.   Barriers to return to prior living situation: medical status, home set-up  Recommendations for discharge: LTACH with continued therapy  Rationale for recommendations: Pt is well below baseline for functional mobility and will benefit from further conditioning.        Entered by: Sadia Euceda 11/03/2017 6:11 PM

## 2017-11-03 NOTE — PLAN OF CARE
Problem: Patient Care Overview  Goal: Plan of Care/Patient Progress Review  Outcome: No Change  Alert and restless at times, follows commands with left side. -120's. SBP to keep under 180, 130-160's/'s. Afebrile. 40% trach dome, moderate white secretions, needing frequent suctioning. Peg tube in place with TF at goal (50). Rectal tube and condom cath in place. Good UOP.      Plan: LTAK today. Continue to monitor BP and update MD with changes and or concerns.

## 2017-11-03 NOTE — PROGRESS NOTES
Care Coordinator- Discharge Planning     Admission Date/Time:  10/6/2017  Attending MD:  Ramesh Kuo, *     Data  Chart reviewed, discussed with interdisciplinary team.   Patient was admitted for:   1. S/P AVR (aortic valve replacement)    2. Dissection of aorta, unspecified portion of aorta (H)    3. Sepsis due to other etiology (H)    4. NSTEMI (non-ST elevated myocardial infarction) (H)    5. Hypotension, unspecified hypotension type    6. 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)    7. Tracheostomy in place (H)    8. H/O aortic root repair    9. Acute bacterial endocarditis    10. Hypertension goal BP (blood pressure) < 140/90    11. Drug-induced constipation         Assessment  Full assessment completed in previous note.    The team reported, pt is medically ready to be transfer to [place].  Pt is off precedex.    Coordination of Care and Referrals: Provided patient/family with options for LTACH.    CC informed Yahaira, [facility] liaison that pt is ready today.  Yahaira checked bed availability at [facility] and stated they don't have bed for today but will be able to take him tomorrow, 11/4.  Yahaira stated transport have been arranged for tomorrow, 11/4 at 2:00 pm  time.  Accepting provider is Ted Huerta, pager # [phone]. CC informed CVICU and CVTS team about bed availability for tomorrow, the plan and provided the accepting MD contact info.  The team agreed with the plan. Ambulance PCS form completed.  CC visited pt SO and dtr and provided update about the d/c plan.  Pt family agreed with the plan.   time have been shared with bedside RN and charge nurse.      Plan  Anticipated Discharge Date:  Tomorrow, 11/4 at 2:00.  Anticipated Discharge Plan:  LTAC, [place].  [facility] transport will provide stretcher transport with marcellus Keller, RN, PHN, BSN  4A and 4E/ ICU  Care Coordinator  Phone: [phone]  Pager: [phone]

## 2017-11-04 ENCOUNTER — TRANSFERRED RECORDS (OUTPATIENT)
Dept: HEALTH INFORMATION MANAGEMENT | Facility: CLINIC | Age: 62
End: 2017-11-04

## 2017-11-04 VITALS
HEART RATE: 82 BPM | WEIGHT: 194.67 LBS | TEMPERATURE: 97.9 F | OXYGEN SATURATION: 96 % | DIASTOLIC BLOOD PRESSURE: 76 MMHG | HEIGHT: 68 IN | SYSTOLIC BLOOD PRESSURE: 100 MMHG | RESPIRATION RATE: 13 BRPM | BODY MASS INDEX: 29.5 KG/M2

## 2017-11-04 LAB
GLUCOSE BLDC GLUCOMTR-MCNC: 127 MG/DL (ref 70–99)
GLUCOSE BLDC GLUCOMTR-MCNC: 155 MG/DL (ref 70–99)
GLUCOSE BLDC GLUCOMTR-MCNC: 161 MG/DL (ref 70–99)

## 2017-11-04 PROCEDURE — 27210913 ZZH NUTRITION PRODUCT INTERMEDIATE PACKET

## 2017-11-04 PROCEDURE — 25000132 ZZH RX MED GY IP 250 OP 250 PS 637: Performed by: STUDENT IN AN ORGANIZED HEALTH CARE EDUCATION/TRAINING PROGRAM

## 2017-11-04 PROCEDURE — 40000275 ZZH STATISTIC RCP TIME EA 10 MIN

## 2017-11-04 PROCEDURE — 25000128 H RX IP 250 OP 636: Performed by: ANESTHESIOLOGY

## 2017-11-04 PROCEDURE — 90732 PPSV23 VACC 2 YRS+ SUBQ/IM: CPT | Performed by: THORACIC SURGERY (CARDIOTHORACIC VASCULAR SURGERY)

## 2017-11-04 PROCEDURE — 40000983 ZZH STATISTIC HFNC ADULT NON-CPAP

## 2017-11-04 PROCEDURE — 27210429 ZZH NUTRITION PRODUCT INTERMEDIATE LITER

## 2017-11-04 PROCEDURE — 40000047 ZZH STATISTIC CTO2 CONT OXYGEN TECH TIME EA 90 MIN

## 2017-11-04 PROCEDURE — S0032 INJECTION, NAFCILLIN SODIUM: HCPCS | Performed by: THORACIC SURGERY (CARDIOTHORACIC VASCULAR SURGERY)

## 2017-11-04 PROCEDURE — 25000132 ZZH RX MED GY IP 250 OP 250 PS 637: Performed by: SURGERY

## 2017-11-04 PROCEDURE — 25000132 ZZH RX MED GY IP 250 OP 250 PS 637: Performed by: THORACIC SURGERY (CARDIOTHORACIC VASCULAR SURGERY)

## 2017-11-04 PROCEDURE — 25000132 ZZH RX MED GY IP 250 OP 250 PS 637: Performed by: ANESTHESIOLOGY

## 2017-11-04 PROCEDURE — 00000146 ZZHCL STATISTIC GLUCOSE BY METER IP

## 2017-11-04 PROCEDURE — 25000125 ZZHC RX 250: Performed by: THORACIC SURGERY (CARDIOTHORACIC VASCULAR SURGERY)

## 2017-11-04 PROCEDURE — 25000128 H RX IP 250 OP 636: Performed by: THORACIC SURGERY (CARDIOTHORACIC VASCULAR SURGERY)

## 2017-11-04 RX ORDER — OXYCODONE HYDROCHLORIDE 5 MG/1
5-10 TABLET ORAL EVERY 4 HOURS PRN
Qty: 18 TABLET | Refills: 0 | Status: SHIPPED | DISCHARGE
Start: 2017-11-04 | End: 2017-12-15

## 2017-11-04 RX ORDER — FAMOTIDINE 40 MG/5ML
20 POWDER, FOR SUSPENSION ORAL 2 TIMES DAILY
Qty: 75 ML | Status: ON HOLD | DISCHARGE
Start: 2017-11-04 | End: 2018-01-03

## 2017-11-04 RX ADMIN — NAFCILLIN: 10 INJECTION, POWDER, FOR SOLUTION INTRAVENOUS at 08:46

## 2017-11-04 RX ADMIN — LABETALOL HCL 200 MG: 200 TABLET, FILM COATED ORAL at 08:06

## 2017-11-04 RX ADMIN — Medication 1 PACKET: at 08:08

## 2017-11-04 RX ADMIN — QUETIAPINE FUMARATE 150 MG: 50 TABLET, FILM COATED ORAL at 08:06

## 2017-11-04 RX ADMIN — HYDROCHLOROTHIAZIDE 25 MG: 25 TABLET ORAL at 08:06

## 2017-11-04 RX ADMIN — INSULIN ASPART 1 UNITS: 100 INJECTION, SOLUTION INTRAVENOUS; SUBCUTANEOUS at 11:13

## 2017-11-04 RX ADMIN — AMLODIPINE BESYLATE 5 MG: 5 TABLET ORAL at 08:06

## 2017-11-04 RX ADMIN — LEVOFLOXACIN 750 MG: 5 INJECTION, SOLUTION INTRAVENOUS at 10:47

## 2017-11-04 RX ADMIN — PNEUMOCOCCAL VACCINE POLYVALENT 0.5 ML
25; 25; 25; 25; 25; 25; 25; 25; 25; 25; 25; 25; 25; 25; 25; 25; 25; 25; 25; 25; 25; 25; 25 INJECTION, SOLUTION INTRAMUSCULAR; SUBCUTANEOUS at 10:48

## 2017-11-04 RX ADMIN — Medication 1 PACKET: at 08:09

## 2017-11-04 RX ADMIN — HEPARIN SODIUM 5000 UNITS: 5000 INJECTION, SOLUTION INTRAVENOUS; SUBCUTANEOUS at 08:06

## 2017-11-04 RX ADMIN — NAFCILLIN: 10 INJECTION, POWDER, FOR SOLUTION INTRAVENOUS at 12:01

## 2017-11-04 RX ADMIN — FAMOTIDINE 20 MG: 40 POWDER, FOR SUSPENSION ORAL at 08:08

## 2017-11-04 RX ADMIN — ASPIRIN 81 MG CHEWABLE TABLET 81 MG: 81 TABLET CHEWABLE at 08:05

## 2017-11-04 RX ADMIN — MULTIVITAMIN 15 ML: LIQUID ORAL at 08:05

## 2017-11-04 RX ADMIN — NAFCILLIN: 10 INJECTION, POWDER, FOR SOLUTION INTRAVENOUS at 04:12

## 2017-11-04 RX ADMIN — OXYCODONE HYDROCHLORIDE 10 MG: 5 TABLET ORAL at 08:26

## 2017-11-04 RX ADMIN — OXYCODONE HYDROCHLORIDE 5 MG: 5 TABLET ORAL at 04:12

## 2017-11-04 RX ADMIN — Medication: at 08:41

## 2017-11-04 RX ADMIN — ACETAMINOPHEN 975 MG: 325 SOLUTION ORAL at 08:05

## 2017-11-04 RX ADMIN — NYSTATIN 500000 UNITS: 100000 SUSPENSION ORAL at 08:06

## 2017-11-04 RX ADMIN — Medication 300 MG: at 08:08

## 2017-11-04 ASSESSMENT — VISUAL ACUITY
OU: GLASSES

## 2017-11-04 NOTE — PLAN OF CARE
Problem: Patient Care Overview  Goal: Plan of Care/Patient Progress Review  Outcome: No Change  Pt lethargic, awakes to name, restless at times with frustration. Pt and significant other refusing many cares and medications despite education on their importance. See MAR and flowsheets for details.   BP stable, sinus tach 100-1teens, afebrile.   Remains on 30% trach dome, small thick white secretions, no resp distress.   No nausea or vomiting, tube feeds at goal, loose brown stool draining from rectal tube.   Good urine output via condom cath.   Pt and sig other updated on all cares throughout shift. Plan for transfer to [place] when bed becomes available.   Please see flowsheets and charting for further detailed information.

## 2017-11-04 NOTE — PLAN OF CARE
Problem: Patient Care Overview  Goal: Plan of Care/Patient Progress Review  Outcome: No Change  D/I/A: Pt with extensive hospital stay, most recently s/p trach and PEG placement.  Initial diagnoses of periaortic fluid collection and embolic strokes. PMH includes: HTN, depression, CHF, COPD, heart murmur, and cardiomyopathy.    Neuro: Spouse refusing most recent neuro exams.  Only eye opening to command.  Moves L side spontaneously.  R side withdraws to pain.    CV: Sinus tach, BP WNL.  Resp: LS coarse.  #6 shiley, trach dome 30% FiO2.  GI: Rectal tube with liquid stools, TF at goal of 50/hr with FWF 30cc q4h.   : Condom cath with adequate UOP  Skin: PEG, R TL PICC, x1 PIV.  Plan: Continue to monitor and notify MD with changes.  [place] tomorrow.

## 2017-11-04 NOTE — PROGRESS NOTES
CVTS Daily Note  11/2/2017  Attending: Ramesh Kuo, *    S:   Hypertensive overnight. BP meds readjusted yesterday,   O:   HR in 90s  Normotensive but hypertensive overnight  O2 sats 100% on trach dome  -4 kg since admit      Intake/Output Summary (Last 24 hours) at 11/02/17 1324  Last data filed at 11/02/17 1200   Gross per 24 hour   Intake          2381.01 ml   Output             3000 ml   Net          -618.99 ml       General: Trached, lightly sedated, periods of agitation with BP and HR elevation when discussing his care in his room.  Neck: Supple, no tracheal deviation  Cardiovascular: Regular tachy, RRR  Pumonary: Non labored breathing w/ PS.  CTAB   Abdomen: Soft, non distended, non tender.   Ext: Upper and lower extremity mild edema  Neuro: Small sluggishly reactive pupil bilaterally approximately 2mm - Left oculocephalic & LT Corneal abscent with mild intermittent nystagmus to the RT side.  Tracks.  Opens eyes to voice.  Nods appropriately to questions periodically.  Left arm moves, but without great motor control.  Left leg moves, but without great control.  Right shoulder shrug.       Labs:  BMP    Recent Labs  Lab 11/02/17  1148 11/02/17  0324 11/01/17  0337 10/31/17  1800 10/31/17  0350 10/30/17  0423 10/29/17  0340   NA  --  143 144  --  144 146* 145*   POTASSIUM 4.0 3.3* 3.8 3.6 4.4 4.0 3.7   CHLORIDE  --  106 108  --  107 112* 111*   CO2  --  30 30  --  30 28 27   BUN  --  24 28  --  29 28 29   CR  --  1.31* 1.47*  --  1.41* 1.33* 1.29*   GLC  --  178* 164*  --  106* 164* 164*   MAG  --  2.1 2.2 2.2 2.2  --  2.2   PHOS  --  3.0 4.2  --  4.6*  --  3.6     CBC    Recent Labs  Lab 11/02/17  0324 11/01/17  0337 10/31/17  0350 10/30/17  0423   WBC 6.4 5.9 6.5 6.5   HGB 9.1* 8.5* 8.4* 8.1*    217 229 247     INR/PTT    Recent Labs  Lab 10/30/17  0423   INR 1.07     ABGNo lab results found in last 7 days.  LFT    Recent Labs  Lab 11/01/17  0337   AST 28   ALT 52       GLUCOSE:     Recent  Labs  Lab 11/04/17  1112 11/04/17  0813 11/03/17  2354 11/03/17  2040 11/03/17  1555 11/03/17  1230  11/02/17  0324  11/01/17  0337  10/31/17  0350  10/30/17  0423  10/29/17  0340   GLC  --   --   --   --   --   --   --  178*  --  164*  --  106*  --  164*  --  164*   * 127* 161* 117* 126* 176*  < >  --   < >  --   < >  --   < >  --   < >  --    < > = values in this interval not displayed.      A/P:   Cricket Miguel is a 62 year old with history of AVR and ascending aortic repair in May 2016 presented on 10/6 with acute mental status changes found to have large moises-aortic fluid collection and multiple strokes likely septic emboli.    Neuro: Tylenol scheduled for fevers,continue neuro checks Q2H. Increase seroquel to wean precedex. Okay to use ibuprofen for fevers if needed per neuro. MRI head and spine with interval changes as expected, no new findings.  Resp: Inability to protect airway requiring mechanical ventilation, on minimal vent settings, did well with pressure support. Trach placed 10/27, PS then trach dome as tolerated  CV: Goal normotension per neurology/neurosurgery. Timing of surgery tbd. ASA. scheduled labetalol  PO BID. Add home HCTZ  GI/FEN: Tube feeds to goal, continue free water for hypernatremia (goal 145-155, go slow per neuro), PEG today  Renal: JOSE non-oliguric.  Endo: SSI  ID: Continue current antibiotic regimen pending ID recommendations (Nafcillin, levofloxacin, gentamycin, rifampin), daily blood cultures negative since 10/13. Added rifampin to abx per ID. Stopped daily cultures since they have been negative. WBC slowly decreasing. PET scan done showed increased uptake around the heart and knee. OMFS consulted for tooth infection as per Dentistry. CT neck shows no abscess, cellulitis.   Heme: No bleeding concerns at this time, hold anticoagulation per neuro and neurosurgery  Prophylaxis: H2 blocker, heparin SC      Dispo: CVICU, LTAC to [place] as soon as Precedex is  weaned     Discussed with CVTS Fellow   Staff surgeons have been informed of changes through both  verbal and written communication.        Walter Bullock   Surgery PGY3  [phone]

## 2017-11-04 NOTE — PLAN OF CARE
Neuro - Exam stable, see charting.  Cardiac - BP well controlled by current regimen of Labetelol and Norvasc.  Afebrile, pulses 2+, trace edema.  No ectopy noted.  Resp - Trach domed 30% for entirety of shift with no complications.  Occasional (Q2-3 hr) suctioning, secretions white to green, thin, small in quantity.  Lungs coarse over dim.    GI - Tube feeds at goal, rectal tube functioning properly.     - Condom cath working appropriately.    Skin - No issues noted.      Significant other left with pt, who was in stable condition, to [place] at 1425.  Discharge teaching and instructions given to sig other.

## 2017-11-06 LAB
FUNGUS SPEC CULT: NORMAL
SPECIMEN SOURCE: NORMAL

## 2017-11-06 NOTE — PLAN OF CARE
Problem: Patient Care Overview  Goal: Plan of Care/Patient Progress Review  Physical Therapy Discharge Summary     Reason for therapy discharge:    Discharged to [facility]     Progress towards therapy goal(s). See goals on Care Plan in [facility] electronic health record for goal details.  Goals not met.  Barriers to achieving goals:   discharge from facility.     Therapy recommendation(s):    Continued therapy is recommended.  Rationale/Recommendations:  Pt will benefit from additonal skilled PT to address impairments and maximize IND..

## 2017-11-13 PROBLEM — F32.A DEPRESSION: Status: ACTIVE | Noted: 2017-09-01

## 2017-11-13 PROBLEM — I33.0 ABSCESS OF AORTIC ROOT: Status: ACTIVE | Noted: 2017-09-01

## 2017-11-13 PROBLEM — I63.9 CVA (CEREBRAL VASCULAR ACCIDENT) (H): Status: ACTIVE | Noted: 2017-09-01

## 2017-11-14 ENCOUNTER — TRANSFERRED RECORDS (OUTPATIENT)
Dept: HEALTH INFORMATION MANAGEMENT | Facility: CLINIC | Age: 62
End: 2017-11-14

## 2017-11-22 NOTE — OP NOTE
DATE OF PROCEDURE:  10/26/2017.      PREOPERATIVE DIAGNOSIS:  Respiratory failure.      POSTOPERATIVE DIAGNOSIS:  Respiratory failure.      SURGICAL PROCEDURE PERFORMED:  Percutaneous tracheostomy.      SURGEON:  Arnaldo Marcelino MD, bronchoscopist.      ASSISTANT:  Awais Sellers MD      ANESTHESIA:  Was 100 mcg of fentanyl and 2 mg of Versed.      The procedure was discussed with the patient, Cricket Miguel's son, as well as the risks of bleeding, infection, loss of airway, and a consent was obtained.      DETAILS OF PROCEDURE:  After appropriate anesthesia was obtained, a timeout was then performed.  The area was prepped and draped in sterile fashion.  The neck was anesthetized with 1% lidocaine spray.  I palpated the trachea and made a 1/2 cm vertical incision over the anterior trachea.  A bronchoscope was then inserted into the endotracheal tube and under bronchoscopic guidance, the endotracheal tube was retracted to between tracheal rings 1 and 2.  At this point, again dissected down to the trachea through our incision and then an Angiocath was then placed between tracheal ring 2 and 3.  Via the Seldinger technique, the tract was then sequentially dilated.  After appropriate dilation, a #6 Shiley was placed over a 26-Nauruan dilator.  The #6 Shiley tracheostomy tube was then inserted into the dilated tracheostomy.  The bronchoscope confirmed excellent placement.  The guidewire and dilator were removed, leaving the tracheostomy in place.  The inner cannula was then placed into the Shiley tracheostomy tube.  The balloon was insufflated.  The bronchoscope was then changed from the endotracheal tube to the tracheostomy tube and confirmed to have good position with no bleeding.  The trach was then secured in place using 2-0 nylon interrupted suture.  The tracheostomy was dressed.  The patient tolerated the procedure well.      I was present during the entire surgical procedure.         ARNALDO MARCELINO MD              D: 2017 18:02   T: 2017 04:18   MT: emely      Name:     ASHTYN STROUD   MRN:      [id number]        Account:        [id number]   :      1955           Procedure Date: 10/26/2017      Document: [id number]

## 2017-11-29 ENCOUNTER — HOME INFUSION (PRE-WILLOW HOME INFUSION) (OUTPATIENT)
Dept: PHARMACY | Facility: CLINIC | Age: 62
End: 2017-11-29

## 2017-11-30 NOTE — PROGRESS NOTES
This is a recent snapshot of the patient's [facility] medical record.  For current drug dose and complete information and questions, call [phone] or In Basket pool, [facility] home infusion ([id number])  CSN Number:  [id number]

## 2017-12-01 ENCOUNTER — HOME INFUSION (PRE-WILLOW HOME INFUSION) (OUTPATIENT)
Dept: PHARMACY | Facility: CLINIC | Age: 62
End: 2017-12-01

## 2017-12-02 ENCOUNTER — HOME INFUSION (PRE-WILLOW HOME INFUSION) (OUTPATIENT)
Dept: PHARMACY | Facility: CLINIC | Age: 62
End: 2017-12-02

## 2017-12-02 ENCOUNTER — APPOINTMENT (OUTPATIENT)
Dept: GENERAL RADIOLOGY | Facility: CLINIC | Age: 62
End: 2017-12-02
Attending: EMERGENCY MEDICINE
Payer: COMMERCIAL

## 2017-12-02 ENCOUNTER — HOSPITAL ENCOUNTER (EMERGENCY)
Facility: CLINIC | Age: 62
Discharge: HOME | End: 2017-12-02
Attending: EMERGENCY MEDICINE | Admitting: EMERGENCY MEDICINE
Payer: COMMERCIAL

## 2017-12-02 VITALS
DIASTOLIC BLOOD PRESSURE: 58 MMHG | SYSTOLIC BLOOD PRESSURE: 100 MMHG | RESPIRATION RATE: 18 BRPM | HEART RATE: 93 BPM | TEMPERATURE: 98.5 F | OXYGEN SATURATION: 98 %

## 2017-12-02 DIAGNOSIS — Z95.828 S/P PICC CENTRAL LINE PLACEMENT: ICD-10-CM

## 2017-12-02 PROCEDURE — 71010 XR CHEST 1 VW: CPT

## 2017-12-02 PROCEDURE — 36584 COMPL RPLCMT PICC RS&I: CPT

## 2017-12-02 PROCEDURE — 40000986 XR CHEST PORT 1 VW

## 2017-12-02 PROCEDURE — 27210195 ZZH KIT POWER PICC DOUBLE LUMEN

## 2017-12-02 PROCEDURE — 99284 EMERGENCY DEPT VISIT MOD MDM: CPT | Mod: 25

## 2017-12-02 RX ORDER — LIDOCAINE 40 MG/G
CREAM TOPICAL
Status: DISCONTINUED | OUTPATIENT
Start: 2017-12-02 | End: 2017-12-02 | Stop reason: HOSPADM

## 2017-12-02 RX ORDER — HEPARIN SODIUM,PORCINE 10 UNIT/ML
2-5 VIAL (ML) INTRAVENOUS
Status: DISCONTINUED | OUTPATIENT
Start: 2017-12-02 | End: 2017-12-02 | Stop reason: HOSPADM

## 2017-12-02 NOTE — PROGRESS NOTES
[facility]   Procedure Note           Peripherally Inserted Central Line Catheter (PICC):       Cricket Miguel  MRN# [id number]   December 2, 2017, 2:54 PM Indication: Medication administration           Pause for the cause: Consent for catheter placement procedure signed  Time out completed  Patient ID's verified using two distinct indicators   Type of line to be used: PICC   Full barrier precautions used: Yes   Skin preparation: Chloraprep   Date of insertion: December 2, 2017, 2:54 PM   Device type: Triple lumen, valved, 5.0   Catheter brand: [employer]   Lot number: [id number]   Insertion location: Right basilic vein   Method of placement: Replacement over guidewire   Number of attempts: With ultrasound: 0   Without ultrasound: 0   0 No   Midline IV device: Dressing dry and intact  Transparent semmipermeable dressing applied  Chlorhexidine patch  Catheter securement device   Arm circumference: Adults 15 cm   Midline extremity circumference: 26 cm   Internal length: 46 cm   Midline visible catheter length: 0 cm   Total catheter length: 46 cm   Tip termination: CAJ   Method of verification: Chest x-ray   Midline patency post placement: Positive blood return  Flushes without difficulty  Saline locked   Line flush: Line flush documented on the eMAR yes   Placement verified by: Physician   Catheter placed by: Arina Ortega   Discontinuation form initiated: No   Patient tolerance: Tolerated well      Summary:  This procedure was performed without difficulty and he tolerated the procedure well with no  immediate complications.       Recorded by Arina Ortega    Attestation:  This patient has been seen and evaluated by me, Arina Ortega RN.  I, Arina Ortega RN, personally performed the above procedure on this the patient.  I have reviewed today's vital signs, medications, labs and imaging.  Arina Ortega RN

## 2017-12-02 NOTE — ED AVS SNAPSHOT
[facility] Emergency Department    [address]    [facility] [address]    Phone:  [phone]    Fax:  [phone]                                       Cricket Miguel   MRN: [id number]    Department:  [facility] Emergency Department   Date of Visit:  12/2/2017           After Visit Summary Signature Page     I have received my discharge instructions, and my questions have been answered. I have discussed any challenges I see with this plan with the nurse or doctor.    ..........................................................................................................................................  Patient/Patient Representative Signature      ..........................................................................................................................................  Patient Representative Print Name and Relationship to Patient    ..................................................               ................................................  Date                                            Time    ..........................................................................................................................................  Reviewed by Signature/Title    ...................................................              ..............................................  Date                                                            Time

## 2017-12-02 NOTE — ED PROVIDER NOTES
History     Chief Complaint:  Vascular Access Problem      HPI   Cricket Miguel is a 62 year old male with a complicated medical history who presents with a vascular access problem. The patient reports that he is on at home antibiotics for an aortic root abscess and today the patient's PICC line was partially removed, so he was referred to come into the emergency department for an X-ray of the port site and further evaluation.    Allergies:  Lisinopril    Medications:    Roxicodone  Pepcid  Tylenol  Aspirin  Proair   Heparin sodium  Reifaden  Seroquel  Normodyne  Prosource  Levaquin  Nutrisource fiber  Miralax  Certavite  D5W 50 mL with nafcillin 2 g infusion  Hydrodiuril  Lipitor  Norvasc    Past Medical History:    Hypertension goal <140/90  Anxiety  Encephalopathy  Endocarditis  Abscess of aortic root  Cerebral vascular accident  Depression  Aortic valve replacement  Aortic root repair  Anemia  Cardiomyopathy  Status post coronary angiogram  Health care home  COPD, mild  Congestive heart failure  Aortic regurgitation  Aortic insufficiency  Aortic root dilation  Hyperlipidemia LDL goal <70  Major depressive disorder, one episode, moderate  Disease of lung  Psoriasis  Tobacco use disorder  Heart murmur  Inguinal hernia  Left lung nodule    Past Surgical History:    Arthroscopy knee incision and drainage  Shoulder arthroscopy  Hernia repair, umbilical  Herniorrhaphy inguinal  Replace aortic valve  Rotator cuff repair right/left  AVR  Severe AR  Aortic root repair  Tracheostomy percutaneous    Family History:    Genetic disorder  Brain cancer  Alzheimer disease    Social History:  Relationship status:   Tobacco use: Pos (Former Smoker, 1 pack/day for 3 years; Quit Date: 4/1/2016)  Alcohol use: Pos (1 drink/week)  The patient presents with his daughter.     Review of Systems   All other systems reviewed and are negative.    Physical Exam     Patient Vitals for the past 24 hrs:   BP Pulse Heart Rate Resp  SpO2   12/02/17 1253 101/74 - 74 - 98 %   12/02/17 1115 102/82 - - - -   12/02/17 1100 115/83 - - - -   12/02/17 1045 105/78 - - - 99 %   12/02/17 1030 114/75 - - - 97 %   12/02/17 1018 112/83 - - - -   12/02/17 1015 - 93 93 18 96 %     Physical Exam  General: Patient is alert and cooperative.  HENT:  Normal nose, oropharynx. Moist oral mucosa.  Eyes: EOMI. Normal conjunctiva.  Neck:  Normal range of motion and appearance.   Cardiovascular:  Normal rate, regular rhythm.   Pulmonary/Chest:  Effort normal. No wheezing or crackles.  Abdominal: Soft. No distension or tenderness.   g-tube site clean, no sign of infection.  Musculoskeletal: Normal range of motion. No edema or tenderness. PICC line right antecubital site; no sign of infection.  Skin: Warm and dry. No rash or bruising.   Psychiatric: Normal mood and affect. Normal behavior and judgement.      Emergency Department Course   Imaging:  Radiographic findings were communicated with the patient who voiced understanding of the findings.    Chest XR, per radiology @ 1139:  Postoperative change. Tip of central line is in the right axillary region.    Chest XR, per radiology @ 1529:  There is a new right-sided PICC line whose tip projects in good position in the distal superior vena cava. Cardiomegaly and midline sternotomy again noted. Lungs are clear. No pneumothorax on this exam.    Emergency Department Course:  Nursing notes and vitals reviewed.  I performed an exam of the patient as documented above.  The above workup was undertaken.  1206: I spoke to the PICC nurse on the phone and she stated that she will be arriving to the emergency department around 1400 and will exchange PICC catheter.  I rechecked the patient and discussed results.  Findings and plan explained to the Patient. Patient discharged home, status improved, with instructions regarding supportive care, medications, and reasons to return as well as the importance of close follow-up was  reviewed.    Impression & Plan    Medical Decision Making:  Cricket Miguel is a 62 year old male who arrives for attention to an inadvertently partially dislodged PICC line. He has no acute symptoms related to this. Chest X-ray shows the tip of the PICC line to be in the axillary region. PICC nurse was contacted and will exchange PICC catheter after which patient will be discharged to home with no changes to his medical record.    Critical Care time:  none    Diagnosis:    ICD-10-CM   1. S/P PICC central line placement Z95.828     Disposition:  Discharged to home.  Elma PIEDRA, am serving as a scribe on 12/2/2017 at 10:53 AM to personally document services performed by Lior Bush MD, based on my observations and the provider's statements to me.    [facility] EMERGENCY DEPARTMENT         Lior Bush MD  12/04/17 0865

## 2017-12-02 NOTE — DISCHARGE INSTRUCTIONS
PICC Line Care  PICC stands for peripherally inserted central catheter. This is a short-term (temporary) tube that is used instead of a regular IV (intravenous) line.   Reasons for using a PICC line  A PICC line may be used because:    It reduces the discomfort of putting in a new IV every time one is needed.    Medicine or nutrition needs to be given over a period of weeks or even months.    A PICC can stay in place longer than an IV, so it reduces needle sticks.    It reduces damage to small veins, where an IV is normally inserted.    A PICC may have more than 1 channel, so different fluids or medicines can be given at the same time.    A PICC line allows for home therapy.  Your PICC will need some care to keep it clean and working. This care includes:    Changing the bandage (dressing)    Flushing the catheter with fluids    Changing the cap on the end of the catheter  A nurse or other healthcare provider will teach you how to do each of these things.    Home care  The following are general care guidelines that will help you care for your PICC line at home:    You can use your arm. But avoid any activity that causes mild pain.    Do not pick at it or pull on the tubing.    Don t lift anything heavier than 10 pounds with the arm on the side of the PICC line.    When you bathe or shower, tape plastic wrap over the site to keep it dry.    Do not put the PICC site under water. No swimming or hot tubs. If the dressing gets wet, change it right away.    Always wash your hands before and after touching any part of your PICC.    Do not allow the tubing to hang freely. Make sure to keep the tubing covered and secured to your arm to prevent the PICC line from being pulled out by accident.  The following tips will help you with dressing changes:    Change the dressing over the site as directed. This is usually once a week. Change it sooner if the dressing gets wet or soiled. Check the dressing daily.    You or a family  member may be able to do the dressing change at home. Or you may be instructed to return to the office or clinic for dressing changes.    Sterile technique must be used for PICC dressing change. If your dressing is changed at home, be sure you or your family member knows sterile dressing technique. Call your health care provider for instructions if you need them.  Follow-up care  Follow up as advised by your healthcare provider or our staff.  When to seek medical advice  Call your healthcare provider right away if any of these occur:    Fever of 100.4 F (38 C) or higher    Drainage from the PICC site    Swelling or bulging around the PICC site    Bleeding from the PICC site    Skin pulls away from the PICC site    Redness, warmth, or pus at the PICC site    Tubing breaks, splits, or leaks    More exposed tubing (tubing seems longer) or the tubing is pulled out completely    Medicine or fluids do not drain from the bag into your PICC  Date Last Reviewed: 7/1/2016 2000-2017 [employer]. [address] [place] [address], [place] [address]. All rights reserved. This information is not intended as a substitute for professional medical care. Always follow your healthcare professional's instructions.

## 2017-12-02 NOTE — ED NOTES
Bed: ED28  Expected date: 12/2/17  Expected time: 10:09 AM  Means of arrival: Ambulance  Comments:  A-515 PICC

## 2017-12-02 NOTE — ED NOTES
Pt presents to ED with c/o PICC line being paritally pulled out last night. Pt and pt's significant other called the home infusion nurse and she recommended coming to ED to have line assessed. Currently has Rifadin running which was ok'd by nurse.

## 2017-12-02 NOTE — ED AVS SNAPSHOT
[facility] Emergency Department    [address] [place] [address]    Phone:  [phone]    Fax:  [phone]                                       Cricket Miguel   MRN: [id number]    Department:  [facility] Emergency Department   Date of Visit:  12/2/2017           Patient Information     Date Of Birth          1955        Your diagnoses for this visit were:     S/P PICC central line placement        You were seen by Lior Bush MD.      Follow-up Information     Follow up with Dipak Gordillo MD.    Specialty:  Family Practice    Why:  As needed    Contact information:    [address] [place] [address]  [phone]          Discharge Instructions           PICC Line Care  PICC stands for peripherally inserted central catheter. This is a short-term (temporary) tube that is used instead of a regular IV (intravenous) line.   Reasons for using a PICC line  A PICC line may be used because:    It reduces the discomfort of putting in a new IV every time one is needed.    Medicine or nutrition needs to be given over a period of weeks or even months.    A PICC can stay in place longer than an IV, so it reduces needle sticks.    It reduces damage to small veins, where an IV is normally inserted.    A PICC may have more than 1 channel, so different fluids or medicines can be given at the same time.    A PICC line allows for home therapy.  Your PICC will need some care to keep it clean and working. This care includes:    Changing the bandage (dressing)    Flushing the catheter with fluids    Changing the cap on the end of the catheter  A nurse or other healthcare provider will teach you how to do each of these things.    Home care  The following are general care guidelines that will help you care for your PICC line at home:    You can use your arm. But avoid any activity that causes mild pain.    Do not pick at it or pull on the tubing.    Don t lift  anything heavier than 10 pounds with the arm on the side of the PICC line.    When you bathe or shower, tape plastic wrap over the site to keep it dry.    Do not put the PICC site under water. No swimming or hot tubs. If the dressing gets wet, change it right away.    Always wash your hands before and after touching any part of your PICC.    Do not allow the tubing to hang freely. Make sure to keep the tubing covered and secured to your arm to prevent the PICC line from being pulled out by accident.  The following tips will help you with dressing changes:    Change the dressing over the site as directed. This is usually once a week. Change it sooner if the dressing gets wet or soiled. Check the dressing daily.    You or a family member may be able to do the dressing change at home. Or you may be instructed to return to the office or clinic for dressing changes.    Sterile technique must be used for PICC dressing change. If your dressing is changed at home, be sure you or your family member knows sterile dressing technique. Call your health care provider for instructions if you need them.  Follow-up care  Follow up as advised by your healthcare provider or our staff.  When to seek medical advice  Call your healthcare provider right away if any of these occur:    Fever of 100.4 F (38 C) or higher    Drainage from the PICC site    Swelling or bulging around the PICC site    Bleeding from the PICC site    Skin pulls away from the PICC site    Redness, warmth, or pus at the PICC site    Tubing breaks, splits, or leaks    More exposed tubing (tubing seems longer) or the tubing is pulled out completely    Medicine or fluids do not drain from the bag into your PICC  Date Last Reviewed: 7/1/2016 2000-2017 [employer]. [address] [facility], [place] [address]. All rights reserved. This information is not intended as a substitute for professional medical care. Always follow your healthcare professional's  instructions.          Future Appointments        Provider Department Dept Phone Center    12/5/2017 2:30 PM Cardiovascular Thoracic Surgery [facility] [phone] [facility]      24 Hour Appointment Hotline       To make an appointment at any [facility], call [phone] ([phone]). If you don't have a family doctor or clinic, we will help you find one. [place] clinics are conveniently located to serve the needs of you and your family.             Review of your medicines      Our records show that you are taking the medicines listed below. If these are incorrect, please call your family doctor or clinic.        Dose / Directions Last dose taken    acetaminophen 32 mg/mL solution   Commonly known as:  TYLENOL   Dose:  650 mg   Quantity:  400 mL        20.3 mLs (650 mg) by Oral or Feeding Tube route every 6 hours as needed for fever or mild pain   Refills:  0        albuterol 108 (90 BASE) MCG/ACT Inhaler   Commonly known as:  PROAIR HFA/PROVENTIL HFA/VENTOLIN HFA   Dose:  6 puff        Inhale 6 puffs into the lungs every 4 hours as needed for shortness of breath / dyspnea   Refills:  0        amLODIPine 5 MG tablet   Commonly known as:  NORVASC   Dose:  5 mg   Quantity:  180 tablet        Take 1 tablet (5 mg) by mouth 2 times daily   Refills:  3        aspirin 81 MG chewable tablet   Dose:  81 mg   Quantity:  36 tablet        1 tablet (81 mg) by Oral or Feeding Tube route daily   Refills:  0        atorvastatin 40 MG tablet   Commonly known as:  LIPITOR   Dose:  40 mg   Quantity:  90 tablet        Take 1 tablet (40 mg) by mouth daily   Refills:  1        D5W 50 mL with nafcillin 2 g infusion   Dose:  2 g        Inject 2 g into the vein every 4 hours   Refills:  0        famotidine 40 MG/5ML suspension   Commonly known as:  PEPCID   Dose:  20 mg   Quantity:  75 mL        Take 2.5 mLs (20 mg) by mouth 2 times daily   Refills:  0        fiber modular packet   Dose:  1 packet        1 packet by Per  Feeding Tube route 3 times daily   Refills:  0        heparin sodium PF 5000 UNIT/0.5ML injection   Dose:  5000 Units        Inject 0.5 mLs (5,000 Units) Subcutaneous every 8 hours   Refills:  0        hydrochlorothiazide 25 MG tablet   Commonly known as:  HYDRODIURIL   Dose:  25 mg   Quantity:  90 tablet        Take 1 tablet (25 mg) by mouth daily   Refills:  1        labetalol 200 MG tablet   Commonly known as:  NORMODYNE   Dose:  200 mg   Quantity:  60 tablet        Take 1 tablet (200 mg) by mouth every 12 hours   Refills:  0        levofloxacin 750 MG/150ML infusion   Commonly known as:  LEVAQUIN   Dose:  750 mg   Indication:  Bacteria in the Blood   Quantity:  1000 mL        Inject 150 mLs (750 mg) into the vein every 24 hours   Refills:  0        multivitamins with minerals Liqd liquid   Dose:  15 mL        15 mLs by Per Feeding Tube route daily   Refills:  0        oxyCODONE IR 5 MG tablet   Commonly known as:  ROXICODONE   Dose:  5-10 mg   Quantity:  18 tablet        Take 1-2 tablets (5-10 mg) by mouth every 4 hours as needed for moderate to severe pain   Refills:  0        polyethylene glycol Packet   Commonly known as:  MIRALAX/GLYCOLAX   Dose:  17 g   Quantity:  7 packet        Take 17 g by mouth daily as needed for constipation   Refills:  0        protein modular   Dose:  1 packet        1 packet by Per Feeding Tube route daily   Refills:  0        QUEtiapine 50 MG tablet   Commonly known as:  SEROquel   Dose:  150 mg   Quantity:  120 tablet        3 tablets (150 mg) by Oral or Feeding Tube route 3 times daily   Refills:  0        rifampin 25 mg/mL Susp   Commonly known as:  REIFADEN   Dose:  300 mg   Indication:  Abscess        12 mLs (300 mg) by Oral or Feeding Tube route 2 times daily   Refills:  0                Procedures and tests performed during your visit     Chest  XR, 1 view PORTABLE    XR Chest 1 View      Orders Needing Specimen Collection     None      Pending Results     No orders found  from 11/30/2017 to 12/3/2017.            Pending Culture Results     No orders found from 11/30/2017 to 12/3/2017.            Pending Results Instructions     If you had any lab results that were not finalized at the time of your Discharge, you can call the ED Lab Result RN at [phone]. You will be contacted by this team for any positive Lab results or changes in treatment. The nurses are available 7 days a week from 10A to 6:30P.  You can leave a message 24 hours per day and they will return your call.        Test Results From Your Hospital Stay        12/2/2017 11:40 AM      Narrative     XR CHEST 1 VW 12/2/2017 11:39 AM    HISTORY: assess PICC line position;         Impression     IMPRESSION: Postoperative change. Tip of central line is in the right  axillary region.    DEMETRI DOMINIQUE MD         12/2/2017  3:30 PM      Narrative     CHEST PORTABLE ONE VIEW   12/2/2017 2:57 PM     HISTORY: PICC line placement.     COMPARISON: 12/2/2017 at 1125.        Impression     IMPRESSION: There is a new right-sided PICC line whose tip projects in  good position in the distal superior vena cava. Cardiomegaly and  midline sternotomy again noted. Lungs are clear. No pneumothorax on  this exam.    KENNY WOOD MD                Clinical Quality Measure: Blood Pressure Screening     Your blood pressure was checked while you were in the emergency department today. The last reading we obtained was  BP: 100/58 . Please read the guidelines below about what these numbers mean and what you should do about them.  If your systolic blood pressure (the top number) is less than 120 and your diastolic blood pressure (the bottom number) is less than 80, then your blood pressure is normal. There is nothing more that you need to do about it.  If your systolic blood pressure (the top number) is 120-139 or your diastolic blood pressure (the bottom number) is 80-89, your blood pressure may be higher than it should be. You should have your  blood pressure rechecked within a year by a primary care provider.  If your systolic blood pressure (the top number) is 140 or greater or your diastolic blood pressure (the bottom number) is 90 or greater, you may have high blood pressure. High blood pressure is treatable, but if left untreated over time it can put you at risk for heart attack, stroke, or kidney failure. You should have your blood pressure rechecked by a primary care provider within the next 4 weeks.  If your provider in the emergency department today gave you specific instructions to follow-up with your doctor or provider even sooner than that, you should follow that instruction and not wait for up to 4 weeks for your follow-up visit.        Thank you for choosing [facility]       Thank you for choosing [facility] for your care. Our goal is always to provide you with excellent care. Hearing back from our patients is one way we can continue to improve our services. Please take a few minutes to complete the written survey that you may receive in the mail after you visit with us. Thank you!        [employer] Information     [employer] gives you secure access to your electronic health record. If you see a primary care provider, you can also send messages to your care team and make appointments. If you have questions, please call your primary care clinic.  If you do not have a primary care provider, please call [phone] and they will assist you.        Care EveryWhere ID     This is your Care EveryWhere ID. This could be used by other organizations to access your [facility] medical records  [id number]        Equal Access to Services     DANIEL BILLS : Hadii jenise Almodovar, wanico kelly, qaybta freddie pabon . So [facility] [phone].    ATENCIÓN: Si habla español, tiene a mendiola disposición servicios gratuitos de asistencia lingüística. Llame al [phone].    We comply with applicable federal civil rights  laws and [place] laws. We do not discriminate on the basis of race, color, national origin, age, disability, sex, sexual orientation, or gender identity.            After Visit Summary       This is your record. Keep this with you and show to your community pharmacist(s) and doctor(s) at your next visit.

## 2017-12-03 ENCOUNTER — HOME INFUSION (PRE-WILLOW HOME INFUSION) (OUTPATIENT)
Dept: PHARMACY | Facility: CLINIC | Age: 62
End: 2017-12-03

## 2017-12-04 ENCOUNTER — HOME INFUSION (PRE-WILLOW HOME INFUSION) (OUTPATIENT)
Dept: PHARMACY | Facility: CLINIC | Age: 62
End: 2017-12-04

## 2017-12-04 ENCOUNTER — CARE COORDINATION (OUTPATIENT)
Dept: CARDIOLOGY | Facility: CLINIC | Age: 62
End: 2017-12-04

## 2017-12-04 NOTE — PROGRESS NOTES
This is a recent snapshot of the patient's [facility] medical record.  For current drug dose and complete information and questions, call [phone] or In Basket pool, fv home infusion ([id number])  CSN Number:  [id number]

## 2017-12-04 NOTE — PROGRESS NOTES
This is a recent snapshot of the patient's [facility] medical record.  For current drug dose and complete information and questions, call [phone] or In Basket pool, fv home infusion ([id number])  CSN Number:  [id number]

## 2017-12-04 NOTE — PROGRESS NOTES
This is a recent snapshot of the patient's [facility] medical record.  For current drug dose and complete information and questions, call [phone] or In Basket pool, fv home infusion ([id number])  CSN Number:  [id number]

## 2017-12-05 ENCOUNTER — TELEPHONE (OUTPATIENT)
Dept: FAMILY MEDICINE | Facility: CLINIC | Age: 62
End: 2017-12-05

## 2017-12-05 NOTE — TELEPHONE ENCOUNTER
Completed forms faxed back to [facility] at [phone].   Originals sent to be scanned.     Marge Heath

## 2017-12-05 NOTE — PROGRESS NOTES
This is a recent snapshot of the patient's [facility] medical record.  For current drug dose and complete information and questions, call [phone] or In Basket pool, fv home infusion ([id number])  CSN Number:  [id number]

## 2017-12-05 NOTE — TELEPHONE ENCOUNTER
Date Forms was received: December 5, 2017    Forms received by: Fax    Last office visit: 11/30/2106    Purpose of Form:  Physician Orders    When the form is due:  asap    How the form needs to be returned for patient:  Fax - [phone]    Form currently placed  [facility]

## 2017-12-06 ENCOUNTER — TELEPHONE (OUTPATIENT)
Dept: FAMILY MEDICINE | Facility: CLINIC | Age: 62
End: 2017-12-06

## 2017-12-06 ENCOUNTER — MEDICAL CORRESPONDENCE (OUTPATIENT)
Dept: HEALTH INFORMATION MANAGEMENT | Facility: CLINIC | Age: 62
End: 2017-12-06

## 2017-12-06 ENCOUNTER — TRANSFERRED RECORDS (OUTPATIENT)
Dept: HEALTH INFORMATION MANAGEMENT | Facility: CLINIC | Age: 62
End: 2017-12-06

## 2017-12-06 ENCOUNTER — RECORDS - HEALTHEAST (OUTPATIENT)
Dept: ADMINISTRATIVE | Facility: OTHER | Age: 62
End: 2017-12-06

## 2017-12-06 ENCOUNTER — HOME INFUSION (PRE-WILLOW HOME INFUSION) (OUTPATIENT)
Dept: PHARMACY | Facility: CLINIC | Age: 62
End: 2017-12-06

## 2017-12-06 ENCOUNTER — COMMUNICATION - HEALTHEAST (OUTPATIENT)
Dept: SCHEDULING | Facility: CLINIC | Age: 62
End: 2017-12-06

## 2017-12-06 DIAGNOSIS — I99.8 VASCULAR CALCIFICATION: ICD-10-CM

## 2017-12-06 DIAGNOSIS — I63.10 CEREBRAL INFARCTION DUE TO EMBOLISM OF PRECEREBRAL ARTERY (H): ICD-10-CM

## 2017-12-06 DIAGNOSIS — E87.6 LOW BLOOD POTASSIUM: Primary | ICD-10-CM

## 2017-12-06 DIAGNOSIS — I10 HTN (HYPERTENSION): ICD-10-CM

## 2017-12-06 DIAGNOSIS — Z95.2 S/P AVR: ICD-10-CM

## 2017-12-06 DIAGNOSIS — I10 ESSENTIAL HYPERTENSION: ICD-10-CM

## 2017-12-06 DIAGNOSIS — I10 HYPERTENSION GOAL BP (BLOOD PRESSURE) < 140/90: ICD-10-CM

## 2017-12-06 LAB
ALT SERPL-CCNC: 65 U/L (ref 14–63)
AST SERPL-CCNC: 47 U/L (ref 15–37)
CREAT SERPL-MCNC: 0.93 MG/DL (ref 0.67–1.17)
GFR SERPL CREATININE-BSD FRML MDRD: >60 ML/MIN/1.73M^2 (ref 60–150)
GLUCOSE SERPL-MCNC: 99 MG/DL (ref 74–100)
POTASSIUM SERPL-SCNC: 2.8 MMOL/L (ref 3.5–5.1)

## 2017-12-06 RX ORDER — ATORVASTATIN CALCIUM 40 MG/1
TABLET, FILM COATED ORAL
Qty: 90 TABLET | Refills: 3 | Status: ON HOLD | OUTPATIENT
Start: 2017-12-06 | End: 2018-01-03

## 2017-12-06 RX ORDER — L. ACIDOPHILUS/L.BULGARICUS 1MM CELL
1 TABLET ORAL 3 TIMES DAILY
COMMUNITY
Start: 2017-12-06 | End: 2018-01-01

## 2017-12-06 RX ORDER — QUETIAPINE FUMARATE 25 MG/1
25 TABLET, FILM COATED ORAL 4 TIMES DAILY
COMMUNITY
Start: 2017-12-06 | End: 2017-12-15

## 2017-12-06 RX ORDER — MICONAZOLE NITRATE
POWDER (GRAM) MISCELLANEOUS PRN
COMMUNITY
Start: 2017-12-06 | End: 2018-01-01

## 2017-12-06 RX ORDER — HYDROCHLOROTHIAZIDE 25 MG/1
12.5 TABLET ORAL DAILY
Qty: 90 TABLET | Refills: 1 | Status: ON HOLD | COMMUNITY
Start: 2017-12-06 | End: 2018-01-03

## 2017-12-06 RX ORDER — AMLODIPINE BESYLATE 5 MG/1
5 TABLET ORAL DAILY
Qty: 180 TABLET | Refills: 3 | Status: ON HOLD | COMMUNITY
Start: 2017-12-06 | End: 2018-01-03

## 2017-12-06 RX ORDER — VENLAFAXINE 100 MG/1
100 TABLET ORAL 2 TIMES DAILY
COMMUNITY
Start: 2017-12-06 | End: 2017-12-15

## 2017-12-06 RX ORDER — ARIPIPRAZOLE 2 MG/1
2 TABLET ORAL DAILY
Qty: 30 TABLET | Refills: 0 | COMMUNITY
Start: 2017-12-06 | End: 2017-12-15

## 2017-12-06 RX ORDER — MIRTAZAPINE 7.5 MG/1
22.5 TABLET, FILM COATED ORAL AT BEDTIME
Qty: 60 TABLET | COMMUNITY
Start: 2017-12-06 | End: 2017-12-13

## 2017-12-06 RX ORDER — LOPERAMIDE HYDROCHLORIDE 2 MG/1
TABLET ORAL
Qty: 30 TABLET | Refills: 0 | COMMUNITY
Start: 2017-12-06 | End: 2017-12-15

## 2017-12-06 RX ORDER — MINERAL OIL AND WHITE PETROLATUM 30; 940 MG/G; MG/G
OINTMENT OPHTHALMIC
COMMUNITY
Start: 2017-12-06 | End: 2018-01-08

## 2017-12-06 RX ORDER — MOXIFLOXACIN 5 MG/ML
1 SOLUTION/ DROPS OPHTHALMIC 3 TIMES DAILY
Qty: 1.25 ML | Refills: 0 | Status: ON HOLD | COMMUNITY
Start: 2017-12-06 | End: 2018-01-03

## 2017-12-06 RX ORDER — POTASSIUM CHLORIDE 1500 MG/1
20 TABLET, EXTENDED RELEASE ORAL 2 TIMES DAILY
Qty: 60 TABLET | Refills: 1 | Status: SHIPPED | OUTPATIENT
Start: 2017-12-06 | End: 2017-12-15

## 2017-12-06 RX ORDER — LABETALOL 100 MG/1
100 TABLET, FILM COATED ORAL EVERY 8 HOURS
Status: ON HOLD | COMMUNITY
Start: 2017-12-06 | End: 2018-01-03

## 2017-12-06 NOTE — TELEPHONE ENCOUNTER
Home care calling for critical lab values ( [facility] home care)   Pt is on IV naficillin   Best # [phone] ok Lm     Potassium 2.8   Albumin 1.5     Home care has been reviewing medications with wife today     Rn asked home care to please send medication list over to med rec    Home sent list     [facility] with Md RADHA - please med rec pt, start potassium 20 meq BID and recheck potassium in a week, please have OT,PT, speech and Home care to be started     Called # above      advised on the information above, med rec done and meds updated     Orders placed     Jane Pizarro RN, BSN  [facility] Triage

## 2017-12-06 NOTE — TELEPHONE ENCOUNTER
Requested Prescriptions   Pending Prescriptions Disp Refills     atorvastatin (LIPITOR) 40 MG tablet [Pharmacy Med Name: ATORVASTATIN 40MG TABLETS] 90 tablet 0     Sig: TAKE 1 TABLET(40 MG) BY MOUTH DAILY    Statins Protocol Failed    12/6/2017  3:40 PM       Failed - LDL on file in past 12 months    Recent Labs   Lab Test 04/03/16   LDL  56            Failed - Recent or future visit with authorizing provider    Patient had office visit in the last year or has a visit in the next 30 days with authorizing provider.  See chart review.              Passed - No abnormal creatine kinase in past 12 months    No lab results found.         Passed - Patient is age 18 or older        Routing refill request to provider for review/approval because:  Labs not current:  LDL  Previously prescribed by Dr. Ryann Kiran, BS, RN, N  [facility] [phone]

## 2017-12-06 NOTE — TELEPHONE ENCOUNTER
Date Forms was received: December 6, 2017    Forms received by: Fax    Last office visit: 11/30/2017    Purpose of Form:  [facility] Orders    When the form is due:  ASAP    How the form needs to be returned for patient:  Fax to [phone]    Form currently placed  [facility]  Gwendolyn Maria LPN

## 2017-12-06 NOTE — TELEPHONE ENCOUNTER
Call transferred to me from [facility] intake, Jero from [facility] calling and saying patient wants Lipitor renewed today. Patient RX is from Dr. Alfredito Garzon. Advised I can send a note to Dr. Gordillo's team to review if Lipitor is something Dr. Gordillo will refill or if this needs to be reviewed by Dr. Garzon. Will route to [facility] RN triage.     Felipa Larsen R.N.  [facility] Triage

## 2017-12-07 ENCOUNTER — RECORDS - HEALTHEAST (OUTPATIENT)
Dept: ADMINISTRATIVE | Facility: OTHER | Age: 62
End: 2017-12-07

## 2017-12-07 ENCOUNTER — HOME INFUSION (PRE-WILLOW HOME INFUSION) (OUTPATIENT)
Dept: PHARMACY | Facility: CLINIC | Age: 62
End: 2017-12-07

## 2017-12-07 ENCOUNTER — TELEPHONE (OUTPATIENT)
Dept: FAMILY MEDICINE | Facility: CLINIC | Age: 62
End: 2017-12-07

## 2017-12-07 NOTE — TELEPHONE ENCOUNTER
Completed forms faxed back to [place] at [phone].   Originals sent to be scanned.     Marge Heath

## 2017-12-07 NOTE — TELEPHONE ENCOUNTER
Date Forms was received: December 7, 2017    Forms received by: Fax    Last office visit: 11/30/2016    Purpose of Form:  [facility] orders for Potassium  When the form is due:  ASAP    How the form needs to be returned for patient:  Fax [phone]    Form currently placed  [facility]  Gwendolyn Maria LPN

## 2017-12-08 NOTE — PROGRESS NOTES
This is a recent snapshot of the patient's [facility] medical record.  For current drug dose and complete information and questions, call [phone] or In Basket pool, fv home infusion ([id number])  CSN Number:  [id number]

## 2017-12-11 ENCOUNTER — HOME INFUSION (PRE-WILLOW HOME INFUSION) (OUTPATIENT)
Dept: PHARMACY | Facility: CLINIC | Age: 62
End: 2017-12-11

## 2017-12-11 ENCOUNTER — TRANSFERRED RECORDS (OUTPATIENT)
Dept: HEALTH INFORMATION MANAGEMENT | Facility: CLINIC | Age: 62
End: 2017-12-11

## 2017-12-11 ENCOUNTER — TELEPHONE (OUTPATIENT)
Dept: FAMILY MEDICINE | Facility: CLINIC | Age: 62
End: 2017-12-11

## 2017-12-11 ENCOUNTER — MEDICAL CORRESPONDENCE (OUTPATIENT)
Dept: HEALTH INFORMATION MANAGEMENT | Facility: CLINIC | Age: 62
End: 2017-12-11

## 2017-12-11 DIAGNOSIS — R11.0 NAUSEA: Primary | ICD-10-CM

## 2017-12-11 DIAGNOSIS — Z98.890 STATUS POST CORONARY ANGIOGRAM: ICD-10-CM

## 2017-12-11 RX ORDER — ONDANSETRON 4 MG/1
TABLET, FILM COATED ORAL
Qty: 20 TABLET | Refills: 0 | Status: ON HOLD | OUTPATIENT
Start: 2017-12-11 | End: 2018-01-03

## 2017-12-11 NOTE — TELEPHONE ENCOUNTER
Albumen 1.5 today- this is a critical low for them and they need to report. This was the same result on Friday

## 2017-12-11 NOTE — TELEPHONE ENCOUNTER
Date Forms was received: December 11, 2017    Forms received by: Fax    Purpose of Form:   [facility]    How the form needs to be returned for patient:  Fax    Form currently placed  [facility]

## 2017-12-11 NOTE — TELEPHONE ENCOUNTER
Fax already received with result - MD RADHA already addressed.     oJycelyn Jones RN  [facility] Triage

## 2017-12-11 NOTE — TELEPHONE ENCOUNTER
Lab results received from [facility]  Per MD RADHA: patient stable, continue same cares. Recheck CMP, CBC in 1 week    Called home care RN @ # below - advised of message above. Will re-draw labs next Wednesday as that is their normal lab draw day for that patient.     Joycelyn Jones RN  [facility]

## 2017-12-11 NOTE — LETTER
[address] [address]  [phone]        January 16, 2018    Cricket Miguel  [address]  [facility] [address]              Dear Cricket Miguel    This is to remind you that your fasting lab work is due.    You may call our office at [phone] to schedule an appointment.    Please disregard this notice if you have already had your labs drawn or made an appointment.        Sincerely,        Dipak Gordillo M.D.

## 2017-12-11 NOTE — PROGRESS NOTES
This is a recent snapshot of the patient's [facility] medical record.  For current drug dose and complete information and questions, call [phone] or In Basket pool, fv home infusion ([id number])  CSN Number:  [id number]

## 2017-12-11 NOTE — TELEPHONE ENCOUNTER
Brook RN with [facility], calling    Update:   Starting last evening, Dry heaving, nausea, pain/discomfort (moaning and groaning all night) in abdomem. Small emesis this morning - reports feeling better since then.   Just feels like there is a lot of gas in his stomach, a few chills   States patient has had liquid stool since starting his potassium supplement last week. Has only urinated 3 times since yesterday morning (last void was 2am, 12/11/2017)  States patient was having a lot of anxiety on Friday (slid out of wheelchair on Friday) - care giver did update Dr. Gordillo    Vitals: Pulse - 100, BP - 123/82, T - 99.3 F    DENIES: blood in stool/emesis, urinary symptoms (frequency, urgency, burning, hesitancy), CP, SOB, Dificulty Breathing, change in diet, new medication    Therapies tried: oxycodone - did help, Pepcid    Ph. [phone] (OK to leave a detailed VM)    Patient wondering what he can/cannot try (has G-Tube).   RN wondering if MD RADHA would like any labs drawn? Will be at patients house for another 25 minutes.     Message handled by Nurse Triage with [facility] - provider name: MD RADHA - OK for Zofran 4-8mg q.8h PRN (#20), Labs (CMP, CBC with diff, UA/UC), please have patient schedule with MD RADHA within the next week (40min appt).  RN, patient, and patient's spouse notified of MD RADHA message  Rx sent to [facility] in [place], Labs futured, Appt scheduled for 12/12/2017 @ 1220 for 40 min    Joycelyn Jones RN  [facility] Triage

## 2017-12-12 ENCOUNTER — MEDICAL CORRESPONDENCE (OUTPATIENT)
Dept: HEALTH INFORMATION MANAGEMENT | Facility: CLINIC | Age: 62
End: 2017-12-12

## 2017-12-12 NOTE — TELEPHONE ENCOUNTER
Completed forms faxed back to [place] at [phone].   Originals sent to be scanned.     Marge Heath

## 2017-12-12 NOTE — PROGRESS NOTES
This is a recent snapshot of the patient's [facility] medical record.  For current drug dose and complete information and questions, call [phone] or In Basket pool, fv home infusion ([id number])  CSN Number:  [id number]

## 2017-12-13 ENCOUNTER — TELEPHONE (OUTPATIENT)
Dept: FAMILY MEDICINE | Facility: CLINIC | Age: 62
End: 2017-12-13

## 2017-12-13 ENCOUNTER — TRANSFERRED RECORDS (OUTPATIENT)
Dept: HEALTH INFORMATION MANAGEMENT | Facility: CLINIC | Age: 62
End: 2017-12-13

## 2017-12-13 ENCOUNTER — RECORDS - HEALTHEAST (OUTPATIENT)
Dept: ADMINISTRATIVE | Facility: OTHER | Age: 62
End: 2017-12-13

## 2017-12-13 ENCOUNTER — HOME INFUSION (PRE-WILLOW HOME INFUSION) (OUTPATIENT)
Dept: PHARMACY | Facility: CLINIC | Age: 62
End: 2017-12-13

## 2017-12-13 DIAGNOSIS — F32.1 MAJOR DEPRESSIVE DISORDER, SINGLE EPISODE, MODERATE (H): Primary | ICD-10-CM

## 2017-12-13 RX ORDER — VENLAFAXINE 100 MG/1
TABLET ORAL
Qty: 30 TABLET | Refills: 0 | Status: CANCELLED | OUTPATIENT
Start: 2017-12-13

## 2017-12-13 NOTE — TELEPHONE ENCOUNTER
Currently on G Tube feeding - please see if home care has nutritionist to review, recheck cmp, cbc in 1-2 weeks

## 2017-12-13 NOTE — TELEPHONE ENCOUNTER
RN spoke with KYRA Shukla at [facility] and advised of TS note below.    She said he is on mechanical soft diet with nectar thick liquids.  He gets medications administered through G-tube.  She said she will check to see if they have nutritionist who can consult with patient.    RANJEET Crow, RN, N  [facility]) [phone]

## 2017-12-13 NOTE — TELEPHONE ENCOUNTER
Date Forms was received: December 13, 2017    Forms received by: Fax    Last office visit: 2017    Purpose of Form:  [facility] orders    When the form is due:  ASAP    How the form needs to be returned for patient:  Fax to [phone]    Form currently placed  [facility]  Gwendolyn Maria LPN

## 2017-12-13 NOTE — TELEPHONE ENCOUNTER
Completed forms faxed back to [place] at [phone].   Originals sent to be scanned.     Marge Heath

## 2017-12-13 NOTE — TELEPHONE ENCOUNTER
Gayla RN from [facility] calling with critical lab value.  Albumin was 1.4 today.  Results received and put in [facility] basket.    Gayla can be reached at [phone], ext [phone].      Merary Kiran BS, RN, [facility] Triage  ) [phone]

## 2017-12-14 ENCOUNTER — MEDICAL CORRESPONDENCE (OUTPATIENT)
Dept: HEALTH INFORMATION MANAGEMENT | Facility: CLINIC | Age: 62
End: 2017-12-14

## 2017-12-14 ENCOUNTER — TELEPHONE (OUTPATIENT)
Dept: FAMILY MEDICINE | Facility: CLINIC | Age: 62
End: 2017-12-14

## 2017-12-14 ENCOUNTER — RECORDS - HEALTHEAST (OUTPATIENT)
Dept: ADMINISTRATIVE | Facility: OTHER | Age: 62
End: 2017-12-14

## 2017-12-14 NOTE — PROGRESS NOTES
This is a recent snapshot of the patient's [facility] medical record.  For current drug dose and complete information and questions, call [phone] or In Basket pool, fv home infusion ([id number])  CSN Number:  [id number]

## 2017-12-14 NOTE — TELEPHONE ENCOUNTER
loperamide (IMODIUM A-D) 2 MG tablet      Last Written Prescription Date:  12/6/2017  Last Fill Quantity: 30 tablet,   # refills: 0  Last Office Visit: 11/30/2016  Future Office visit:    Next 5 appointments (look out 90 days)     Dec 15, 2017 11:20 AM CST   Office Visit with Dipak [facility]ordillo MD   [facility]    [address] [place] [id number]   [phone]                 Routing refill request to provider for review/approval because:  Drug not on the [facility], [facility] or  [place] refill protocol or controlled substance            Requested Prescriptions   Pending Prescriptions Disp Refills     mirtazapine (REMERON) 7.5 MG TABS tablet [Pharmacy Med Name: MIRTAZAPINE 7.5MG TABLETS]  Historical  Last Written Prescription Date:  12/6/2017  Last Fill Quantity: 60 tablet,  # refills: 0   Last Office Visit with [facility], [facility] or [facility] prescribing provider:  11/30/2016     Future Office Visit:    Next 5 appointments (look out 90 days)     Dec 15, 2017 11:20 AM CST   Office Visit with Dipak Gordillo MD   [facility]    [address] [place] [id number] [phone]                 Routing refill request to provider for review/approval because:  Medication is reported/historical   60 tablet 0     Sig: GIVE 3 TABLETS(22.5MG) VIA G-TUBE AT BEDTIME    Atypical Antidepressants Protocol Failed    12/13/2017  9:44 AM       Failed - Patient has PHQ-9 score less than 5 in past 6 months.    Please review PHQ-9 score.   PHQ-9 SCORE 5/6/2013 5/21/2014 6/6/2016   Total Score 1 2 -   Total Score - - 0     HILARY-7 SCORE 5/7/2013 5/21/2014 6/6/2016   Total Score 1 0 -   Total Score - - 0          Passed - Recent or future visit with authorizing provider's specialty    Patient had office visit in the last year or has a visit in the next 30 days with authorizing provider.  See chart review.          Passed - Patient is age 18  or older       Passed - Recent (6 mo) or future visit with authorizing provider's specialty    Patient had office visit in the last 6 months or has a visit in the next 30 days with authorizing provider.  See chart review.                               venlafaxine (EFFEXOR) 100 MG tablet [Pharmacy Med Name: VENLAFAXINE 100MG TABLETS]  Historical  Last Written Prescription Date:  12/6/2017  Last Fill Quantity: NA,  # refills: NA   Last Office Visit with [facility] or [facility] prescribing provider:  11/30/2016   Future Office Visit:    Next 5 appointments (look out 90 days)     Dec 15, 2017 11:20 AM CST   Office Visit with Dipak Gordillo MD   [facility]    [address] [place] [id number] [phone]                 Routing refill request to provider for review/approval because:  Medication is reported/historical   30 tablet 0     Sig: GIVE 1 TABLET VIA G-TUBE TWICE DAILY WITH MEALS    Serotonin-Norepinephrine Reuptake Inhibitors  Failed    12/13/2017  9:44 AM       Failed - PHQ-9 score of less than 5 in past 6 months    Please review PHQ-9 score.   PHQ-9 SCORE 5/6/2013 5/21/2014 6/6/2016   Total Score 1 2 -   Total Score - - 0     HILARY-7 SCORE 5/7/2013 5/21/2014 6/6/2016   Total Score 1 0 -   Total Score - - 0          Failed - Normal serum creatinine on file in past 12 months    Recent Labs   Lab Test  11/02/17   0324   CR  1.31*            Passed - Blood pressure under 140/90    BP Readings from Last 3 Encounters:   12/02/17 100/58   11/04/17 100/76   10/06/17 125/86          Passed - Recent or future visit with authorizing provider's specialty    Patient had office visit in the last year or has a visit in the next 30 days with authorizing provider.  See chart review.          Passed - Patient is age 18 or older       Passed - Recent (6 mo) or future visit with authorizing provider's specialty    Patient had office visit in the last 6 months or has a  visit in the next 30 days with authorizing provider.  See chart review.

## 2017-12-14 NOTE — TELEPHONE ENCOUNTER
Date Forms was received: December 14, 2017    Forms received by: Fax    Last office visit: 02/2017    Purpose of Form:  [facility] orders    When the form is due:  ASAP    How the form needs to be returned for patient:  Fax to [phone]    Form currently placed  [facility]  Gwendolyn Maria LPN

## 2017-12-15 ENCOUNTER — TELEPHONE (OUTPATIENT)
Dept: FAMILY MEDICINE | Facility: CLINIC | Age: 62
End: 2017-12-15

## 2017-12-15 ENCOUNTER — OFFICE VISIT (OUTPATIENT)
Dept: FAMILY MEDICINE | Facility: CLINIC | Age: 62
End: 2017-12-15
Payer: COMMERCIAL

## 2017-12-15 VITALS
TEMPERATURE: 98.1 F | DIASTOLIC BLOOD PRESSURE: 94 MMHG | OXYGEN SATURATION: 97 % | BODY MASS INDEX: 27.74 KG/M2 | HEIGHT: 68 IN | SYSTOLIC BLOOD PRESSURE: 126 MMHG | HEART RATE: 84 BPM | WEIGHT: 183 LBS

## 2017-12-15 DIAGNOSIS — J44.9 COPD, MILD (H): ICD-10-CM

## 2017-12-15 DIAGNOSIS — I33.0 ABSCESS OF AORTIC ROOT: ICD-10-CM

## 2017-12-15 DIAGNOSIS — I63.10 CEREBRAL INFARCTION DUE TO EMBOLISM OF PRECEREBRAL ARTERY (H): Primary | ICD-10-CM

## 2017-12-15 DIAGNOSIS — I50.9 CONGESTIVE HEART FAILURE, NYHA CLASS 2, UNSPECIFIED CONGESTIVE HEART FAILURE TYPE (H): ICD-10-CM

## 2017-12-15 DIAGNOSIS — I10 HYPERTENSION GOAL BP (BLOOD PRESSURE) < 140/90: ICD-10-CM

## 2017-12-15 DIAGNOSIS — Z98.890 H/O AORTIC ROOT REPAIR: ICD-10-CM

## 2017-12-15 DIAGNOSIS — Z51.81 MEDICATION MONITORING ENCOUNTER: ICD-10-CM

## 2017-12-15 DIAGNOSIS — I42.9 CARDIOMYOPATHY, UNSPECIFIED TYPE (H): ICD-10-CM

## 2017-12-15 DIAGNOSIS — F41.9 ANXIETY: ICD-10-CM

## 2017-12-15 DIAGNOSIS — I77.810 AORTIC ROOT DILATATION (H): ICD-10-CM

## 2017-12-15 DIAGNOSIS — Z95.2 S/P AVR (AORTIC VALVE REPLACEMENT): ICD-10-CM

## 2017-12-15 DIAGNOSIS — E78.5 HYPERLIPIDEMIA LDL GOAL <70: ICD-10-CM

## 2017-12-15 DIAGNOSIS — F32.1 MAJOR DEPRESSIVE DISORDER, SINGLE EPISODE, MODERATE (H): ICD-10-CM

## 2017-12-15 PROCEDURE — 99215 OFFICE O/P EST HI 40 MIN: CPT | Performed by: FAMILY MEDICINE

## 2017-12-15 RX ORDER — LOPERAMIDE HYDROCHLORIDE 2 MG/1
TABLET ORAL
Qty: 30 TABLET | Refills: 0 | COMMUNITY
Start: 2017-12-15 | End: 2018-01-01

## 2017-12-15 RX ORDER — QUETIAPINE FUMARATE 25 MG/1
25 TABLET, FILM COATED ORAL 4 TIMES DAILY PRN
Qty: 60 TABLET | Status: ON HOLD | COMMUNITY
Start: 2017-12-15 | End: 2018-01-03

## 2017-12-15 RX ORDER — POTASSIUM CHLORIDE 1.5 G/1.58G
POWDER, FOR SOLUTION ORAL
Status: ON HOLD | COMMUNITY
Start: 2017-12-08 | End: 2018-01-03

## 2017-12-15 RX ORDER — LOPERAMIDE HCL 2 MG
CAPSULE ORAL
Qty: 30 CAPSULE | Refills: 0 | OUTPATIENT
Start: 2017-12-15

## 2017-12-15 RX ORDER — VENLAFAXINE 100 MG/1
100 TABLET ORAL 2 TIMES DAILY
Qty: 60 TABLET | Refills: 3 | Status: ON HOLD | OUTPATIENT
Start: 2017-12-15 | End: 2018-01-03

## 2017-12-15 RX ORDER — MIRTAZAPINE 7.5 MG/1
TABLET, FILM COATED ORAL
Qty: 90 TABLET | Refills: 0 | Status: ON HOLD | OUTPATIENT
Start: 2017-12-15 | End: 2018-01-03

## 2017-12-15 RX ORDER — ARIPIPRAZOLE 2 MG/1
6 TABLET ORAL DAILY
Qty: 30 TABLET | Refills: 0 | Status: ON HOLD | COMMUNITY
Start: 2017-12-15 | End: 2018-01-03

## 2017-12-15 NOTE — TELEPHONE ENCOUNTER
Duplicate request for imodium and Effexor     Huddled with Md Sharon Yip ok for Remeron only fill 30 days     Jane Pizarro RN, BSN  [facility]

## 2017-12-15 NOTE — PATIENT INSTRUCTIONS
Ensure/Boost 3 times daily recommended to help with nutrition    Follow up with Psychiatry, as discussed      [facility]                        To reach your care team during and after hours:   [phone]  To reach our pharmacy:        [phone]    Clinic Hours                        Our clinic hours are:    Monday   7:30 am to 7:00 pm                  Tuesday through Friday 7:30 am to 5:00 pm                             Saturday   8:00 am to 12:00 pm      Sunday   Closed      Pharmacy Hours                        Our pharmacy hours are:    Monday   8:30 am to 7:00 pm       Tuesday to Friday  8:30 am to 6:00 pm                       Saturday    9:00 am to 1:00 pm              Sunday    Closed              There is also information available at our web site:  [email or web address]    If your provider ordered any lab tests and you do not receive the results within 10 business days, please call the clinic.    If you need a medication refill please contact your pharmacy.  Please allow 2-3 business days for your refill to be completed.    Our clinic offers telephone visits and e visits.  Please ask one of your team members to explain more.      Use IT M[employer]ES IT (secure email communication and access to your chart) to send your primary care provider a message or make an appointment. Ask someone on your Team how to sign up for [employer].  Immunizations                      Immunization History   Administered Date(s) Administered     Influenza Vaccine IM 3yrs+ 4 Valent IIV4 10/28/2017     Pneumococcal 23 valent 11/04/2017     TD (ADULT, 7+) 04/14/1999        Health Maintenance                         Health Maintenance Due   Topic Date Due     Heart Failure Action Plan Reviewed Every 3 Years  1955     Wellness Visit with your Primary Provider - yearly  1955     Colon Cancer Screening - FIT Test - yearly  03/16/1963     Hepatitis C Screening  03/16/1971     Microalbumin Lab - yearly  02/07/2013      Depression Action Plan Review - yearly  05/06/2014     Depression Assessment - every 6 months  12/03/2016     Cholesterol Lab - yearly  04/03/2017     Prostate Test (PSA) - yearly  11/30/2017

## 2017-12-15 NOTE — PROGRESS NOTES
"  SUBJECTIVE:   Cricket Miguel is a 62 year old male who presents to clinic today with caretaker/SO for the following health issues:    Hx of CVA/Endocarditis -- Occurred 10/6/17 after abscess to heart, hospitalized ([facility]), transitional care followed on 11/4/17, sent home on 12/1/17 with reports of \"good recovery\" per hospital -- now undergoing PT/speech therapy and home care to help with speech, etc - this is going well. Discussed plans for surgery (Soumya at [facility]) in 1/18, with IV abx until that time. Followed by cardiology ([facility]).    See extensive DC summary from [place] - reviewed in total with patient and significant other - Meghna    He has been able to swallow/chew with minimal issues, but \"nothing tastes good\" - will be seen by Dentistry for assistance with dentures. Feeding tube in place for medications. BM's have not been good - liquid and irregular - Miralax, lactobacillus, imodium A-D.     Leg strength improving bilaterally, left arm strength is \"okay\" but he continues to have issues with right arm strength and use - overall left side better than left.     Eye sx (issues closing eye) were assessed by Opthalmology and is being treated with drops, successfully - will follow up after surgeries.     Reviewed documentation - followed by Infections Disease (Saleem at [place], Rock at [facility]).     COPD -- Giovanni complained of dyspnea issues when he lays down to bed - sleeping on an incline. Nebulizer available which helps - tracheotomy tube sometime around 11/25 while in hospital.     HTN -- Labetalol 100 mg every 8 hours, Amlodipine 5 mg daily, HCTZ 12.5 mg daily. Monitored by Homecare Nurse - good numbers.     BP Readings from Last 3 Encounters:   12/27/17 111/90   12/16/17 128/79   12/15/17 (!) 126/94     Anxiety/Depression/Agitation/Sleep -- Abilify 6 mg daily, Seroquel 25 mg 4 times daily via oral or feeding tube, Remeron 22.5 mg daily, Effexor 100 mg twice daily. Giovanni reported that he sleeps \"well\", but his " SO stated that he often wakes up and is restless. Giovanni reported that he often gets frustrated.     Pain -- Roxicodone 5-10 mg prn every 4 hours, Tylenol 650 mg via feeding tube every 6 hours.      Lipids -- Lipitor 40 mg daily, Aspirin 81 mg daily.    Recent Labs   Lab Test  10/13/17   0357 04/03/16 02/07/12   1214   CHOL   --   105*  198   HDL   --   25  47   LDL   --   56  123   TRIG  265*  120  138   CHOLHDLRATIO   --    --   4.2       Problem list and histories reviewed & adjusted, as indicated.  Additional history: as documented    Reviewed and updated as needed this visit by clinical staff  Tobacco  Allergies  Meds  Med Hx  Surg Hx  Fam Hx  Soc Hx      Reviewed and updated as needed this visit by Provider       Wt Readings from Last 4 Encounters:   12/26/17 180 lb 1.6 oz (81.7 kg)   12/15/17 183 lb (83 kg)   11/02/17 194 lb 10.7 oz (88.3 kg)   02/06/17 202 lb (91.6 kg)       Health Maintenance    Health Maintenance Due   Topic Date Due     HF ACTION PLAN Q3 YR  1955     WELLNESS VISIT Q1 YR  1955     COPD ACTION PLAN Q1 YR  1955     FIT Q1 YR  03/16/1963     HEPATITIS C SCREENING  03/16/1971     MICROALBUMIN Q1 YEAR  02/07/2013     DEPRESSION ACTION PLAN Q1 YR  05/06/2014     PHQ-9 Q6 MONTHS  12/03/2016     LIPID MONITORING Q1 YEAR  04/03/2017     PSA Q1 YR  11/30/2017       Current Problem List    Patient Active Problem List   Diagnosis     Tobacco use disorder     Hypertension goal BP (blood pressure) < 140/90     Disease of lung     Major depressive disorder, single episode, moderate (HCC)     Advance Care Planning     Psoriasis     COPD, mild (H)     CHF (congestive heart failure), NYHA class II (H)     AR (aortic regurgitation)     AI (aortic insufficiency)     Aortic root dilatation (H)     Hyperlipidemia LDL goal <70     Health Care Home     Status post coronary angiogram     Cardiomyopathy (H)     S/P AVR (aortic valve replacement)     H/O aortic root repair     Anemia      Endocarditis     Encephalopathy     Abscess of aortic root     CVA (cerebral vascular accident) (H)     Depression     Anxiety     Heart failure (H)     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       Past Medical History    Past Medical History:   Diagnosis Date     Abscess of aortic root 09/2017     AI (aortic insufficiency)     severe     Anxiety      Aortic root dilatation (H)      AR (aortic regurgitation)     severe     Cardiomyopathy (H)      CHF (congestive heart failure), NYHA class II (H)      COPD, mild (H)     spirometry 12/16     CVA (cerebral vascular accident) (H) 09/2017    septic emboli - MSSA - cerebellum, Left MCA, Rt Occipital, Aphasia, Left visual field cut, moderate to severe encephalopathy     Depression 09/2017     Heart murmur      Hyperlipidemia LDL goal <70 10/31/2010     Hypertension goal BP (blood pressure) < 140/90      Inguinal hernia      left lung nodule  1/29/2008     Major depressive disorder, single episode, moderate (H)      Psoriasis childhood     Tobacco use disorder        Past Surgical History    Past Surgical History:   Procedure Laterality Date     ARTHROSCOPY KNEE INCISION AND DRAINAGE Left 10/8/2017    Procedure: ARTHROSCOPY KNEE INCISION AND DRAINAGE;  Arthroscopic Incision and Drainage of Left Knee;  Surgeon: Lucretia Munoz MD;  Location: [facility] OR     HC SHOULDER ARTHROSCOPY, DX  2001    Arthroscopy, Shoulder RT Dr OSIRIS Andersen     HC SHOULDER ARTHROSCOPY, DX  1/04    LT arthroscopy Dr OSIRIS Andersen     HERNIA REPAIR, UMBILICAL  1/08    incarcerated - dr rockwell     HERNIORRHAPHY INGUINAL  12/21/2012    HERNIORRHAPHY INGUINAL;  Right Inguinal Hernia Repair with mesh ;  Surgeon: Mello Rockwell MD;  Location: [facility] OR     REPAIR ANEURYSM ASCENDING AORTA N/A 12/19/2017    Procedure: REPAIR ANEURYSM ASCENDING AORTA;  REDO Median STERNOTOMY, FEMORAL CANNULATION, Lysis of adhesions, AORTIC ROOT VALVE HOMOGRAFT with 26mm x 7.0cm Cryolife Aortic  valve and conduit;  Surgeon: Pili Bowers MD;  Location: [facility] OR     REPLACE VALVE AORTIC N/A 5/17/2016    REPLACE VALVE AORTIC - Dr Bowers     ROTATOR CUFF REPAIR RT/LT  11/10    Rt arthroscopy - Dr OSIRIS Andersen     SURGICAL HISTORY OF -   5/16    AVR, severe AR, aortic root repair     TRACHEOSTOMY PERCUTANEOUS  10/27/2017            Current Medications    No current outpatient prescriptions on file.       Allergies    Allergies   Allergen Reactions     Lisinopril Swelling     One-sided facial swelling after being on lisinopril/HCTZ for one week.        Immunizations    Immunization History   Administered Date(s) Administered     Influenza Vaccine IM 3yrs+ 4 Valent IIV4 10/28/2017     Pneumococcal 23 valent 11/04/2017     TD (ADULT, 7+) 04/14/1999       Family History    Family History   Problem Relation Age of Onset     Genetic Disorder Mother      Bone plateover growth Agustin. shoulder surgeries     CANCER Mother      brain cancer     Alzheimer Disease Father        Social History    Social History     Social History     Marital status:      Spouse name: N/A     Number of children: 3     Years of education: 12     Occupational History     Not on file.     Social History Main Topics     Smoking status: Former Smoker     Packs/day: 1.00     Years: 35.00     Quit date: 4/1/2016     Smokeless tobacco: Never Used      Comment: 4/2016     Alcohol use 0.0 oz/week     0 Standard drinks or equivalent per week      Comment: 1 drink per week avg, seldom     Drug use: Yes      Comment: marijuana- daily     Sexual activity: Yes     Partners: Female     Other Topics Concern     Caffeine Concern Yes     3 cups     Sleep Concern No     Special Diet No     trying to watch salt     Exercise Yes     walking     Seat Belt No     Parent/Sibling W/ Cabg, Mi Or Angioplasty Before 65f 55m? No     Social History Narrative       All above reviewed and updated, all stable unless otherwise noted    Recent labs  "reviewed    ROS:  Constitutional, HEENT, cardiovascular, pulmonary, GI, , musculoskeletal, neuro, skin, endocrine and psych systems are negative, except as in HPI or otherwise noted     This document serves as a record of the services and decisions personally performed and made by Dipak Gordillo MD FAAFP. It was created on their behalf by Jasper Jean, a trained medical scribe. The creation of this document is based the provider's statements to the medical scribe.  Jasper Jean December 15, 2017 11:57 AM      OBJECTIVE:                                                    BP (!) 126/94  Pulse 84  Temp 98.1  F (36.7  C) (Oral)  Ht 5' 8\" (1.727 m)  Wt 183 lb (83 kg)  SpO2 97%  BMI 27.83 kg/m2  Body mass index is 27.83 kg/(m^2).  GENERAL: overweight, pt in wheelchair, IV and feeding tube present, left facial drooping  HENT: ear canals and TM's normal upon viewing with otoscope, nose and mouth without ulcers or lesions upon viewing with otoscope  RESP: lungs clear to auscultation - no rales, no rhonchi, no wheezes  CV: regular rates and rhythm, normal S1 S2, no S3 or S4 and no murmur, no click or rub -  ABDOMEN: soft, no tenderness, no hepatosplenomegaly, no masses, normal bowel sounds  SKIN: no suspicious lesions, no rashes to visible skin  NEURO: mentation and speech at baseline, extremities with diminished strength (L>R)  PSYCH: affect normal    DIAGNOSTICS/PROCEDURES:                                                      Results for orders placed or performed in visit on 12/06/17   Creatinine   Result Value Ref Range    Creatinine 0.93 0.67 - 1.17 mg/dL    GFR Estimate >60 60 - 150 mL/min/1.73m^2    Narrative    LAB RESULTS- [facility]   Glucose   Result Value Ref Range    Glucose 99 74 - 100 mg/dL    Narrative    LAB RESULTS- [facility]   Potassium   Result Value Ref Range    Potassium 2.8 (L) 3.5 - 5.1 mmol/L    Narrative    LAB RESULTS- [facility]   ALT   Result Value Ref " Range    ALT 65 (H) 14 - 63 U/L    Narrative    LAB RESULTS- [facility]   AST   Result Value Ref Range    AST 47 (H) 15 - 37 U/L    Narrative    LAB RESULTS- [facility]        ASSESSMENT/PLAN:                                                        ICD-10-CM    1. 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) I63.10 QUEtiapine (SEROQUEL) 25 MG tablet     MENTAL HEALTH REFERRAL  - Adult; Psychiatry and Medication Management; Psychiatry; [facility]: Psychiatry Clinic [phone]; We will contact you to schedule the appointment or please call with any questions   2. Abscess of aortic root I51.89    3. Aortic root dilatation (H) I77.810    4. Cardiomyopathy, unspecified type (H) I42.9    5. Congestive heart failure, NYHA class 2, unspecified congestive heart failure type (H) I50.9    6. S/P AVR (aortic valve replacement) Z95.2    7. H/O aortic root repair Z98.890    8. COPD, mild (H) J44.9    9. Major depressive disorder, single episode, moderate (HCC) F32.1 MENTAL HEALTH REFERRAL  - Adult; Psychiatry and Medication Management; Psychiatry; [facility]: Psychiatry Clinic [phone]; We will contact you to schedule the appointment or please call with any questions     venlafaxine (EFFEXOR) 100 MG tablet   10. Anxiety F41.9 MENTAL HEALTH REFERRAL  - Adult; Psychiatry and Medication Management; Psychiatry; [facility]: Psychiatry Clinic [phone]; We will contact you to schedule the appointment or please call with any questions     venlafaxine (EFFEXOR) 100 MG tablet   11. Hypertension goal BP (blood pressure) < 140/90 I10    12. Hyperlipidemia LDL goal <70 E78.5    13. Medication monitoring encounter Z51.81      Discussed treatment/modality options, including risk and benefits, he desires diet discussed, follow up with another visit, further health care maintenance, medications refills, OTC meds (Boost/Ensure TID), proceed with surgery, referrals (Psychiatry), and observation. All diagnosis  above reviewed and noted above, otherwise stable.  See [facility] orders for further details.  Follow up as needed.    - Pt received influenza and pneumonia vaccinations while in [facility].     Length of visit was 45+ minutes with more than 50 percent of that time used for discussing medical concerns and education    Health Maintenance Due   Topic Date Due     HF ACTION PLAN Q3 YR  1955     WELLNESS VISIT Q1 YR  1955     COPD ACTION PLAN Q1 YR 1955     FIT Q1 YR  03/16/1963     HEPATITIS C SCREENING  03/16/1971     MICROALBUMIN Q1 YEAR  02/07/2013     DEPRESSION ACTION PLAN Q1 YR  05/06/2014     PHQ-9 Q6 MONTHS  12/03/2016     LIPID MONITORING Q1 YEAR  04/03/2017     PSA Q1 YR  11/30/2017     Patient Instructions     Ensure/Boost 3 times daily recommended to help with nutrition    Follow up with Psychiatry, as discussed    The information in this document, created by the medical scribe for me, accurately reflects the services I personally performed and the decisions made by me. I have reviewed and approved this document for accuracy.   Dipak Gordillo MD FAAFP            Dipak Gordillo MD [address]  [address]  [phone] Fax

## 2017-12-15 NOTE — TELEPHONE ENCOUNTER
Completed forms faxed back to [facility] at [phone].   Originals sent to be scanned.     Marge Heath

## 2017-12-15 NOTE — TELEPHONE ENCOUNTER
Reason for Call:  Medication or medication refill:    Do you use a [facility]?  Name of the pharmacy and phone number for the current request:     [id number] - [place], [place] - [address] AT [facility] [id number] - [place], [place] - [address] AT [id number] - [place], [place] - [address] [facility]    Name of the medication requested:     Other request:     Can we leave a detailed message on this number?     Phone number patient can be reached at:     Best Time:     Call taken on 12/15/2017 at 2:07 PM by Maryuri Quezada

## 2017-12-15 NOTE — NURSING NOTE
"Chief Complaint   Patient presents with     RECHECK       Initial BP (!) 126/94  Pulse 84  Temp 98.1  F (36.7  C) (Oral)  Ht 5' 8\" (1.727 m)  Wt 183 lb (83 kg)  SpO2 97%  BMI 27.83 kg/m2 Estimated body mass index is 27.83 kg/(m^2) as calculated from the following:    Height as of this encounter: 5' 8\" (1.727 m).    Weight as of this encounter: 183 lb (83 kg)..  BP completed using cuff size: jerson Schulz MA  "

## 2017-12-16 ENCOUNTER — APPOINTMENT (OUTPATIENT)
Dept: GENERAL RADIOLOGY | Facility: CLINIC | Age: 62
End: 2017-12-16
Attending: EMERGENCY MEDICINE
Payer: COMMERCIAL

## 2017-12-16 ENCOUNTER — HOSPITAL ENCOUNTER (INPATIENT)
Facility: CLINIC | Age: 62
LOS: 19 days | Discharge: NURSING, REHAB OR LONG-TERM CARE | DRG: 219 | End: 2018-01-04
Attending: INTERNAL MEDICINE | Admitting: INTERNAL MEDICINE
Payer: COMMERCIAL

## 2017-12-16 ENCOUNTER — HOSPITAL ENCOUNTER (EMERGENCY)
Facility: CLINIC | Age: 62
Discharge: ANOTHER HOSPITAL | End: 2017-12-16
Attending: EMERGENCY MEDICINE | Admitting: EMERGENCY MEDICINE
Payer: COMMERCIAL

## 2017-12-16 VITALS
OXYGEN SATURATION: 95 % | RESPIRATION RATE: 15 BRPM | SYSTOLIC BLOOD PRESSURE: 128 MMHG | TEMPERATURE: 98.6 F | DIASTOLIC BLOOD PRESSURE: 79 MMHG

## 2017-12-16 DIAGNOSIS — F32.A DEPRESSION, UNSPECIFIED DEPRESSION TYPE: ICD-10-CM

## 2017-12-16 DIAGNOSIS — F41.9 ANXIETY: ICD-10-CM

## 2017-12-16 DIAGNOSIS — I39 ENDOCARDITIS AND HEART VALVE DISORDERS IN DISEASES CLASSIFIED ELSEWHERE: ICD-10-CM

## 2017-12-16 DIAGNOSIS — I33.0 ACUTE BACTERIAL ENDOCARDITIS: Primary | ICD-10-CM

## 2017-12-16 DIAGNOSIS — G89.18 ACUTE POST-OPERATIVE PAIN: ICD-10-CM

## 2017-12-16 DIAGNOSIS — F05 DELIRIUM DUE TO ANOTHER MEDICAL CONDITION: ICD-10-CM

## 2017-12-16 DIAGNOSIS — I63.89 CEREBROVASCULAR ACCIDENT (CVA) DUE TO OTHER MECHANISM (H): ICD-10-CM

## 2017-12-16 DIAGNOSIS — I50.23 ACUTE ON CHRONIC SYSTOLIC CONGESTIVE HEART FAILURE (H): ICD-10-CM

## 2017-12-16 DIAGNOSIS — I10 HYPERTENSION GOAL BP (BLOOD PRESSURE) < 140/90: ICD-10-CM

## 2017-12-16 DIAGNOSIS — I42.9 CARDIOMYOPATHY, UNSPECIFIED TYPE (H): ICD-10-CM

## 2017-12-16 PROBLEM — I50.9 HEART FAILURE (H): Status: ACTIVE | Noted: 2017-12-16

## 2017-12-16 LAB
ANION GAP SERPL CALCULATED.3IONS-SCNC: 8 MMOL/L (ref 3–14)
BASOPHILS # BLD AUTO: 0.1 10E9/L (ref 0–0.2)
BASOPHILS NFR BLD AUTO: 0.8 %
BUN SERPL-MCNC: 10 MG/DL (ref 7–30)
CALCIUM SERPL-MCNC: 8.6 MG/DL (ref 8.5–10.1)
CHLORIDE SERPL-SCNC: 101 MMOL/L (ref 94–109)
CO2 SERPL-SCNC: 28 MMOL/L (ref 20–32)
CREAT SERPL-MCNC: 1.1 MG/DL (ref 0.66–1.25)
DIFFERENTIAL METHOD BLD: ABNORMAL
EOSINOPHIL # BLD AUTO: 1 10E9/L (ref 0–0.7)
EOSINOPHIL NFR BLD AUTO: 9.9 %
ERYTHROCYTE [DISTWIDTH] IN BLOOD BY AUTOMATED COUNT: 16.3 % (ref 10–15)
GFR SERPL CREATININE-BSD FRML MDRD: 67 ML/MIN/1.7M2
GLUCOSE SERPL-MCNC: 99 MG/DL (ref 70–99)
HCT VFR BLD AUTO: 31.5 % (ref 40–53)
HGB BLD-MCNC: 10.3 G/DL (ref 13.3–17.7)
IMM GRANULOCYTES # BLD: 0.1 10E9/L (ref 0–0.4)
IMM GRANULOCYTES NFR BLD: 0.5 %
LYMPHOCYTES # BLD AUTO: 1.4 10E9/L (ref 0.8–5.3)
LYMPHOCYTES NFR BLD AUTO: 14.6 %
MCH RBC QN AUTO: 32.4 PG (ref 26.5–33)
MCHC RBC AUTO-ENTMCNC: 32.7 G/DL (ref 31.5–36.5)
MCV RBC AUTO: 99 FL (ref 78–100)
MONOCYTES # BLD AUTO: 0.6 10E9/L (ref 0–1.3)
MONOCYTES NFR BLD AUTO: 6.4 %
NEUTROPHILS # BLD AUTO: 6.6 10E9/L (ref 1.6–8.3)
NEUTROPHILS NFR BLD AUTO: 67.8 %
NRBC # BLD AUTO: 0 10*3/UL
NRBC BLD AUTO-RTO: 0 /100
NT-PROBNP SERPL-MCNC: ABNORMAL PG/ML (ref 0–900)
PLATELET # BLD AUTO: 391 10E9/L (ref 150–450)
POTASSIUM SERPL-SCNC: 3.9 MMOL/L (ref 3.4–5.3)
RBC # BLD AUTO: 3.18 10E12/L (ref 4.4–5.9)
SODIUM SERPL-SCNC: 137 MMOL/L (ref 133–144)
TROPONIN I SERPL-MCNC: 0.57 UG/L (ref 0–0.04)
WBC # BLD AUTO: 9.8 10E9/L (ref 4–11)

## 2017-12-16 PROCEDURE — 84484 ASSAY OF TROPONIN QUANT: CPT | Performed by: EMERGENCY MEDICINE

## 2017-12-16 PROCEDURE — 25000128 H RX IP 250 OP 636: Performed by: EMERGENCY MEDICINE

## 2017-12-16 PROCEDURE — 99285 EMERGENCY DEPT VISIT HI MDM: CPT | Mod: 25

## 2017-12-16 PROCEDURE — 83880 ASSAY OF NATRIURETIC PEPTIDE: CPT | Performed by: EMERGENCY MEDICINE

## 2017-12-16 PROCEDURE — 85025 COMPLETE CBC W/AUTO DIFF WBC: CPT | Performed by: EMERGENCY MEDICINE

## 2017-12-16 PROCEDURE — 21400006 ZZH R&B CCU INTERMEDIATE UMMC

## 2017-12-16 PROCEDURE — 93005 ELECTROCARDIOGRAM TRACING: CPT

## 2017-12-16 PROCEDURE — 80048 BASIC METABOLIC PNL TOTAL CA: CPT | Performed by: EMERGENCY MEDICINE

## 2017-12-16 PROCEDURE — 71020 XR CHEST 2 VW: CPT

## 2017-12-16 PROCEDURE — 96374 THER/PROPH/DIAG INJ IV PUSH: CPT

## 2017-12-16 RX ORDER — ASPIRIN 81 MG/1
81 TABLET, CHEWABLE ORAL DAILY
Status: DISCONTINUED | OUTPATIENT
Start: 2017-12-17 | End: 2018-01-04 | Stop reason: HOSPADM

## 2017-12-16 RX ORDER — MOXIFLOXACIN 5 MG/ML
1 SOLUTION/ DROPS OPHTHALMIC 3 TIMES DAILY
Status: DISCONTINUED | OUTPATIENT
Start: 2017-12-17 | End: 2017-12-17

## 2017-12-16 RX ORDER — FAMOTIDINE 40 MG/5ML
20 POWDER, FOR SUSPENSION ORAL 2 TIMES DAILY
Status: DISCONTINUED | OUTPATIENT
Start: 2017-12-17 | End: 2017-12-20

## 2017-12-16 RX ORDER — VENLAFAXINE 100 MG/1
100 TABLET ORAL 2 TIMES DAILY
Status: DISCONTINUED | OUTPATIENT
Start: 2017-12-17 | End: 2017-12-18

## 2017-12-16 RX ORDER — ATORVASTATIN CALCIUM 40 MG/1
40 TABLET, FILM COATED ORAL DAILY
Status: DISCONTINUED | OUTPATIENT
Start: 2017-12-17 | End: 2018-01-04 | Stop reason: HOSPADM

## 2017-12-16 RX ORDER — HEPARIN SODIUM 5000 [USP'U]/.5ML
5000 INJECTION, SOLUTION INTRAVENOUS; SUBCUTANEOUS EVERY 12 HOURS
Status: DISCONTINUED | OUTPATIENT
Start: 2017-12-17 | End: 2017-12-17

## 2017-12-16 RX ORDER — FUROSEMIDE 10 MG/ML
40 INJECTION INTRAMUSCULAR; INTRAVENOUS ONCE
Status: COMPLETED | OUTPATIENT
Start: 2017-12-16 | End: 2017-12-16

## 2017-12-16 RX ADMIN — FUROSEMIDE 40 MG: 10 INJECTION, SOLUTION INTRAVENOUS at 20:46

## 2017-12-16 ASSESSMENT — ENCOUNTER SYMPTOMS
SHORTNESS OF BREATH: 1
VOMITING: 1
FEVER: 0
NERVOUS/ANXIOUS: 1
DYSURIA: 0
DIARRHEA: 1
BACK PAIN: 0
WEAKNESS: 1
DIFFICULTY URINATING: 0
NAUSEA: 0
FREQUENCY: 0

## 2017-12-16 NOTE — IP AVS SNAPSHOT
"    UNIT 6C [facility]: [phone]                                              INTERAGENCY TRANSFER FORM - LAB / IMAGING / EKG / EMG RESULTS   2017                    Hospital Admission Date: 2017  ASHTYN STROUD   : 1955  Sex: Male        Attending Provider: Pili Bowers MD     Allergies:  Lisinopril    Infection:  None   Service:  CARDIOTHORAC    Ht:  1.727 m (5' 8\")   Wt:  74.9 kg (165 lb 2 oz)   Admission Wt:  83 kg (182 lb 15.7 oz)    BMI:  25.11 kg/m 2   BSA:  1.9 m 2            Patient PCP Information     Provider PCP Type    Dipak Gordillo MD General         Lab Results - 3 Days      Magnesium [[id number]]  Resulted: 18 0722, Result status: Final result    Ordering provider: Pili Bowers MD  18 2300 Resulting lab: [facility]    Specimen Information    Type Source Collected On   Blood  18 0626          Components       Value Reference Range Flag Lab   Magnesium 2.1 1.6 - 2.3 mg/dL  51            Actinomyces rule out [[id number]]  Resulted: 18 1503, Result status: Final result    Ordering provider: Pili Bowers MD  17 1250 Resulting lab: [facility]    Specimen Information    Type Source Collected On   Bronchial lavage  17 1250          Components       Value Reference Range Flag Lab   Specimen Description Bronchial lavage      Special Requests Received in anaerobic tubes.   75   Culture Micro No Actinomyces species isolated   225            Magnesium [[id number]] (Abnormal)  Resulted: 18 0815, Result status: Final result    Ordering provider: Pili Bowers MD  18 2300 Resulting lab: [facility]    Specimen Information    Type Source Collected On   Blood  18 0746          Components       Value Reference Range Flag Lab   Magnesium 2.5 1.6 - 2.3 mg/dL H 51            Fungus Culture, non-blood " [[id number]]  Resulted: 01/02/18 1315, Result status: Preliminary result    Ordering provider: Pili Bowers MD  12/19/17 1150 Resulting lab: [facility]    Specimen Information    Type Source Collected On   Tissue Other 12/19/17 1150          Components       Value Reference Range Flag Lab   Specimen Description Tissue PSEUDOANEURYSM      Special Requests 3   75   Culture Micro Culture negative after 2 weeks   75            Fungus Culture, non-blood [[id number]]  Resulted: 01/02/18 1315, Result status: Preliminary result    Ordering provider: Pili Bowers MD  12/19/17 1124 Resulting lab: [facility]    Specimen Information    Type Source Collected On   Fluid Other 12/19/17 1123          Components       Value Reference Range Flag Lab   Specimen Description Mediastinal Fluid      Special Requests 1   75   Culture Micro Culture negative after 2 weeks   75            Fungus Culture, non-blood [[id number]]  Resulted: 01/02/18 1315, Result status: Preliminary result    Ordering provider: Pili Bowers MD  12/19/17 1141 Resulting lab: [facility]    Specimen Information    Type Source Collected On   Fluid Other 12/19/17 1139          Components       Value Reference Range Flag Lab   Specimen Description Fluid AORTIC ROOT AND PSEUDOANUERYSYM      Special Requests 2   75   Culture Micro Culture negative after 2 weeks   75            Magnesium [[id number]]  Resulted: 01/02/18 0733, Result status: Final result    Ordering provider: Pili Bowers MD  01/01/18 2300 Resulting lab: [facility]    Specimen Information    Type Source Collected On   Blood  01/02/18 0701          Components       Value Reference Range Flag Lab   Magnesium 2.0 1.6 - 2.3 mg/dL  51            Basic metabolic panel [[id number]] (Abnormal)  Resulted: 01/02/18 0733, Result status: Final result     Ordering provider: Pili Bowers MD  01/01/18 2300 Resulting lab: [facility]    Specimen Information    Type Source Collected On   Blood  01/02/18 0701          Components       Value Reference Range Flag Lab   Sodium 141 133 - 144 mmol/L  51   Potassium 4.1 3.4 - 5.3 mmol/L  51   Chloride 109 94 - 109 mmol/L  51   Carbon Dioxide 26 20 - 32 mmol/L  51   Anion Gap 7 3 - 14 mmol/L  51   Glucose 113 70 - 99 mg/dL H 51   Urea Nitrogen 27 7 - 30 mg/dL  51   Creatinine 0.94 0.66 - 1.25 mg/dL  51   GFR Estimate 81 >60 mL/min/1.7m2  51   Comment:  Non  GFR Calc   GFR Estimate If Black >90 >60 mL/min/1.7m2  51   Comment:  African American GFR Calc   Calcium 9.0 8.5 - 10.1 mg/dL  51            CBC with platelets [[id number]] (Abnormal)  Resulted: 01/02/18 0718, Result status: Final result    Ordering provider: Pili Bowers MD  01/01/18 2300 Resulting lab: [facility]    Specimen Information    Type Source Collected On   Blood  01/02/18 0701          Components       Value Reference Range Flag Lab   WBC 11.5 4.0 - 11.0 10e9/L H 51   RBC Count 3.05 4.4 - 5.9 10e12/L L 51   Hemoglobin 9.3 13.3 - 17.7 g/dL L 51   Hematocrit 31.4 40.0 - 53.0 % L 51    78 - 100 fl H 51   MCH 30.5 26.5 - 33.0 pg  51   MCHC 29.6 31.5 - 36.5 g/dL L 51   RDW 19.1 10.0 - 15.0 % H 51   Platelet Count 497 150 - 450 10e9/L H 51            UA reflex to Microscopic and Culture [[id number]] (Abnormal)  Resulted: 01/01/18 1936, Result status: Final result    Ordering provider: Deny Trimble PA  01/01/18 0859 Resulting lab: [facility]    Specimen Information    Type Source Collected On   Urine Urine clean catch 01/01/18 1900          Components       Value Reference Range Flag Lab   Color Urine Yellow   51   Appearance Urine Clear   51   Glucose Urine 30 NEG^Negative mg/dL A 51   Bilirubin Urine Negative  NEG^Negative  51   Ketones Urine Negative NEG^Negative mg/dL  51   Specific Gravity Urine 1.011 1.003 - 1.035  51   Blood Urine Negative NEG^Negative  51   pH Urine 6.0 5.0 - 7.0 pH  51   Protein Albumin Urine 10 NEG^Negative mg/dL A 51   Urobilinogen mg/dL Normal 0.0 - 2.0 mg/dL  51   Nitrite Urine Negative NEG^Negative  51   Leukocyte Esterase Urine Negative NEG^Negative  51   Source Clean catch urine   51   RBC Urine <1 0 - 2 /HPF  51   WBC Urine <1 0 - 2 /HPF  51   Squamous Epithelial /HPF Urine 1 0 - 1 /HPF  51            Magnesium [[id number]]  Resulted: 01/01/18 0902, Result status: Final result    Ordering provider: Pili Bowers MD  12/31/17 2300 Resulting lab: [facility]    Specimen Information    Type Source Collected On   Blood  01/01/18 0819          Components       Value Reference Range Flag Lab   Magnesium 2.1 1.6 - 2.3 mg/dL  51            Basic metabolic panel [[id number]] (Abnormal)  Resulted: 01/01/18 0902, Result status: Final result    Ordering provider: Pili Bowers MD  12/31/17 8659 Resulting lab: [facility]    Specimen Information    Type Source Collected On   Blood  01/01/18 0819          Components       Value Reference Range Flag Lab   Sodium 145 133 - 144 mmol/L H 51   Potassium 4.0 3.4 - 5.3 mmol/L  51   Chloride 110 94 - 109 mmol/L H 51   Carbon Dioxide 25 20 - 32 mmol/L  51   Anion Gap 10 3 - 14 mmol/L  51   Glucose 102 70 - 99 mg/dL H 51   Urea Nitrogen 27 7 - 30 mg/dL  51   Creatinine 0.84 0.66 - 1.25 mg/dL  51   GFR Estimate >90 >60 mL/min/1.7m2  51   Comment:  Non  GFR Calc   GFR Estimate If Black >90 >60 mL/min/1.7m2  51   Comment:  African American GFR Calc   Calcium 9.4 8.5 - 10.1 mg/dL  51            CRP inflammation [[id number]] (Abnormal)  Resulted: 01/01/18 0902, Result status: Final result    Ordering provider: Pili Bowers MD  12/31/17 7970 Resulting lab:  [facility]    Specimen Information    Type Source Collected On   Blood  01/01/18 0819          Components       Value Reference Range Flag Lab   CRP Inflammation 38.0 0.0 - 8.0 mg/L H 51            CBC with platelets [[id number]] (Abnormal)  Resulted: 01/01/18 0839, Result status: Final result    Ordering provider: Pili Bowers MD  12/31/17 2300 Resulting lab: [facility]    Specimen Information    Type Source Collected On   Blood  01/01/18 0819          Components       Value Reference Range Flag Lab   WBC 12.7 4.0 - 11.0 10e9/L H 51   RBC Count 3.01 4.4 - 5.9 10e12/L L 51   Hemoglobin 9.4 13.3 - 17.7 g/dL L 51   Hematocrit 31.0 40.0 - 53.0 % L 51    78 - 100 fl H 51   MCH 31.2 26.5 - 33.0 pg  51   MCHC 30.3 31.5 - 36.5 g/dL L 51   RDW 19.0 10.0 - 15.0 % H 51   Platelet Count 495 150 - 450 10e9/L H 51            Testing Performed By     Lab - Abbreviation Name Director Address Valid Date Range    51 - Unknown [facility] Unknown [address] [facility] [address] 12/31/14 1010 - Present    75 - Unknown [facility] Unknown [address] [facility] [address] 01/15/15 1019 - Present    225 - Unknown [facility] Unknown [address] [facility] [address] 12/19/14 0954 - Present            Unresulted Labs (24h ago through future)    Start       Ordered    01/03/18 0600  Platelet count  (Pharmacological Prophylaxis- heparin (If CrCl less than 30 mL/min))  EVERY THREE DAYS,   Routine     Comments:  If no result is listed, this lab has not been done the past 365 days. LATEST LAB RESULT: Platelet Count (10e9/L)       Date                     Value                 12/31/2017               458 (H)          ----------    12/31/17 1507    01/01/18 0500  Magnesium  DAILY,   Routine      12/31/17 1507    01/01/18 0500  CRP inflammation  (CRP  "inflammation - UU,UR,SH,RH,PH,WY,HI)  EVERY MONDAY,   Routine     Comments:  Every Monday while on enteral tube feeding.    12/31/17 1507    Unscheduled  Hemoglobin  CONDITIONAL (SPECIFY),   Routine     Comments:  IF patient has signs and symptoms of bleeding.    12/23/17 0157    Unscheduled  Magnesium  (Magnesium Replacement - Adult - \"High\" - Replacement for all levels less than or equal to 2 mg/dL)  CONDITIONAL (SPECIFY),   Routine     Comments:  Obtain Magnesium Level for these conditions:  *IF no magnesium result within 24 hrs before initiation of order set, draw magnesium level with next lab collect.    *2 HOURS AFTER last magnesium replacement dose when magnesium replacement given for level less than 1.6  *Next morning after magnesium dose.     Repeat Magnesium Replacement if necessary.    12/23/17 0157    Unscheduled  Magnesium  (Magnesium Replacement - Adult - \"High\" - Replacement for all levels less than or equal to 2 mg/dL)  CONDITIONAL (SPECIFY),   Routine     Comments:  Obtain Magnesium Level for these conditions:  *IF no magnesium result within 24 hrs before initiation of order set, draw magnesium level with next lab collect.    *2 HOURS AFTER last magnesium replacement dose when magnesium replacement given for level less than 1.6  *Next morning after magnesium dose.     Repeat Magnesium Replacement if necessary.    12/23/17 0157    Unscheduled  Phosphorus  (POTASSIUM Phosphate - \"High\" - Replacement for all levels less than 2.8 mg/dL)  CONDITIONAL (SPECIFY),   Routine     Comments:  Obtain Phosphorus Level for these conditions:  *IF no phosphorus result within 24 hrs before initiation of order set, draw phosphorus level with next lab collect.    *2 HOURS AFTER last phosphorus replacement dose when phosphorus replacement given for level less than 2.0  *Next morning after phosphorus dose.     Repeat Phosphorus Replacement if necessary.    12/23/17 0157    Unscheduled  Potassium  (Potassium Replacement - " "\"High\" - Replacement for all levels less than 4.1 mmol/L - UU,UR,UA,RH,SH,PH,WY )  CONDITIONAL (SPECIFY),   Routine     Comments:  Obtain Potassium Level for these conditions:  *IF no potassium result within 24 hrs before initiation of order set, draw potassium level with next lab collect.    *2 HOURS AFTER last IV potassium replacement dose and 4 hours after an oral replacement dose when potassium replacement given for level less than 3.4.  *Next morning after potassium dose.     Repeat Potassium Replacement if necessary.    17    Unscheduled  Potassium  (Potassium Replacement - \"High\" - Replacement for all levels less than 4.1 mmol/L)  CONDITIONAL (SPECIFY),   Routine     Comments:  Obtain Potassium Level for these conditions:  *IF no potassium result within 24 hrs before initiation of order set, draw potassium level with next lab collect.    *2 HOURS AFTER last IV potassium replacement dose and 4 hours after an oral replacement dose when potassium replacement given for level less than 3.4.  *Next morning after potassium dose.     Repeat Potassium Replacement if necessary.    17         ECG/EMG Results      Echocardiogram Complete [[id number]]  Resulted: 17, Result status: In process    Ordering provider: Elgin Mike MD  17 Performed: 17 - 17    Resulting lab: [facility]             ECHO LIMITED WITH OPTISON [[id number]]  Resulted: 17 1021, Result status: Edited Result - FINAL    Ordering provider: Elgin Mike MD  17 Resulted by: Tyler Gooden MD    Performed: 17 - 17 Resulting lab: RADIOLOGY RESULTS    Narrative:       [id number]  [id number]  [id number]  [id number]^SILVIA^ELGIN^VELASQUEZ           [facility],[place]  Echocardiography Laboratory  [address] [place] [id number]     Name: ASHTYN STROUD  MRN: [id number]  : 1955  Study Date: 2017 08:55 " AM  Age: 62 yrs  Gender: Male  Patient Location: U[facility]6C  Reason For Study: Aortic Valve Replacement, Concern for acute AI  Ordering Physician: GABRIEL VEGA  Referring Physician: SELF, REFERRED  Performed By: BERLIN Charlton     BSA: 2.0 m2  Height: 68 in  Weight: 182 lb  BP: 123/94 mmHg  _____________________________________________________________________________  __        Procedure  Limited Portable Echo Adult. Contrast Optison. Optison (NDC #4039-4931-44)  given intravenously. Patient was given 6 ml mixture of 3 ml Optison and 6 ml  saline. 3 ml wasted. IV start location L Forearm .  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 Today, there is circumferential free space  noted surrounding the aortic graft, dehiscence and rocking of the graft from  the native aorta and severe AI in the space surrounding the aortic graft  (perivalvular/perigraft). The circumferential free space was present on the  previous studies of October 2017 but was filled with echodense material and  the graft was not independently mobile, and there was no significant AI. The  prosthetic aortic valve leaflets are not well visualized today.  Findings were discussed with Dr. Tacho Muñoz.  _____________________________________________________________________________  __        Left Ventricle  Left ventricular wall thickness is normal. Mild left ventricular dilation is  present. The Ejection Fraction is estimated at 30-35%. Diastolic function not  assessed due to tachycardia. Severe diffuse hypokinesis is present.     Right Ventricle  The right ventricle is normal size. Global right ventricular function is  mildly reduced.     Atria  Both atria appear normal.     Mitral Valve  Trace mitral insufficiency is present.        Aortic Valve  See below.     Tricuspid Valve  The  tricuspid valve is normal.     Vessels  The patient is post bio-Bentall - aortic root replacement with a 30 mm graft  and AVR with a Trifecta valve. Today, there is circumferential free space  noted surrounding the aortic graft, dehiscence and rocking of the graft from  the native aorta and severe AI in the space surrounding the aortic graft  (perivalvular/perigraft). The circumferential free space was present on the  previous studies of 2017 but was filled with echodense material and  the graft was not independently mobile, and there was no significant AI. The  prosthetic aortic valve leaflets are not well visualized today.     Pericardium  No pericardial effusion is present.     Compared to Previous Study  This study was compared with the study from 10/06/2017 . Since the prior  study, the LV function has worsened and the aortic graft appears to be  dehisced, as described above.     _____________________________________________________________________________  __  MMode/2D Measurements & Calculations  asc Aorta Diam: 3.3 cm     EF(MOD-bp): 35.3 %           Doppler Measurements & Calculations  AI P1/2t: 122.3 msec     _____________________________________________________________________________  __           Report approved by: Crista Colorado 2017 10:21 AM       1    Type Source Collected On     17 0886          Image results for this order (below)        ECHO LIMITED WITH OPTISON [[id number]]  Resulted: 17 110, Result status: Edited Result - FINAL    Ordering provider: Rea Boyce MD  17 9778 Resulted by: TRIP Conway MD    Performed: 17 1108 - 17 1130 Resulting lab: RADIOLOGY RESULTS    Narrative:       [id number]  [id number]  [id number]  [id number]^CATALINO^DOMINICK^DAVID           [facility],[place]  Echocardiography Laboratory  [address] [place] [address]     Name: ASHTYN STROUD  MRN: [id number]  :  1955  Study Date: 12/20/2017 11:02 AM  Age: 62 yrs  Gender: Male  Patient Location: [facility]  Reason For Study: Aortic Aneurysm- limited for FX  History: Aortic Aneurysm- limited for FX  Ordering Physician: DOMINICK BAE  Referring Physician: SELF, REFERRED  Performed By: Erica Ureña RDCS     BSA: 2.0 m2  Height: 68 in  Weight: 186 lb  BP: 101/54 mmHg  _____________________________________________________________________________  __        Procedure  Limited Portable Echo Adult. Contrast Optison. Optison (NDC #6200-2096-30)  given intravenously. Patient was given 6 ml mixture of 3 ml Optison and 6 ml  saline. 3 ml wasted.  _____________________________________________________________________________  __        Interpretation Summary  The Ejection Fraction is estimated at 20-25%.  Biplane LV EF 23%.  _____________________________________________________________________________  __        Left Ventricle  The Ejection Fraction is estimated at 20-25%. Biplane LV EF 23%.  _____________________________________________________________________________  __                          Report approved by: Crista Welsh 12/20/2017 11:45 AM              _____________________________________________________________________________  __       1    Type Source Collected On     12/20/17 1102          View Image (below)        Echocardiogram Complete [[id number]]  Resulted: 12/20/17 1108, Result status: In process    Ordering provider: Rea Boyce MD  12/20/17 0752 Performed: 12/20/17 1108 - 12/20/17 1108    Resulting lab: [facility]                   Encounter-Level Documents:     There are no encounter-level documents.      Order-Level Documents:     There are no order-level documents.

## 2017-12-16 NOTE — LETTER
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  Admit Date/Time: 12/16/2017 11:31 PM  Discharge Date: 1/4/18  Payor Source: Payor: [employer] / Plan: [employer] OPEN ACCESS FULLY INSURED / Product Type: HMO /     Readmission Assessment Measure (RICH) Risk Score/category: Elevated risk for readmission    Reason for Communication Hand-off Referral: Fragility    Discharge Plan:  Rene Evans  PH: [phone]  F: [phone]     Concern for non-adherence with plan of care:   Y/N N  Discharge Needs Assessment:  Needs       Most Recent Value    Equipment Currently Used at Home hospital bed, lift device, wheelchair, manual, commode, other (see comments) [Khushboo, bedpan]    Transportation Available family or friend will provide, other (see comments) [medical transportation]    [facility] [phone]    Home Care [facility] [phone], Fax: [phone]    Home Infusion Provider [facility] [phone], Fax: [phone]          Already enrolled in Tele-monitoring program and name of program:  Unknown  Follow-up specialty is recommended: Yes - Will need [facility] reinstated after discharge from [facility]    Follow-up plan:  Future Appointments  Date Time Provider Department Center   1/8/2018 1:30 PM Marianna Oconnor APRN CNP [facility] [place] [facility]   1/12/2018 11:45 AM RU LAB RULAB P [facility]   1/12/2018 12:45 PM [facility]   1/19/2018 1:30 PM [facility]       Any outstanding tests or procedures:        Referrals     Future Labs/Procedures    Nutrition Services Adult IP Consult      Comments:    Reason:  Tube feeding, calorie counts.    Occupational Therapy Adult Consult     Comments:    Evaluate and treat as clinically indicated.    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

## 2017-12-16 NOTE — IP AVS SNAPSHOT
UNIT 6C [facility]: [phone]                                              INTERAGENCY TRANSFER FORM - PHYSICIAN ORDERS   12/16/2017                   CHF Orders/Instructions for Nursing Home/TCU     CHF Orders and Instructions for Nursing Home/TCU  1.  Have the patient get a scale to put in their room and then use at home.  2.  Post a weight chart or calendar in room next to the scale.  3.  Ask patient to weigh themselves daily first thing when they get up in the morning, before breakfast, with only their night gown on and record on the chart/calendar (if able).  4.  Record NH/TCU admission weight.  5.  Record daily am weight, with same clothes every day. If patient is in a wheelchair, note weight of wheel chair.   6.  Provide patient CHF education booklet and review with patient and family.  7.  Vital signs twice daily and prn. Check oxygen sats prn dyspnea.  8.  Apply Oxygen 2 or 3 liters/min by nasal cannula prn O2 Sat < 90%.   9.  Notify NP/MD:   1. If HR <50 bpm, SBP <90mmHg, or O2 Sat < 90%.   2. If weight is up more than 2 lbs. in one day or 5 lbs. in one week from their admission weight OR if patient has signs or symptoms of CHF, such as worsening dyspnea or leg edema.  10.  ACE-wraps or support stockings (knee high) to bilateral lower extremities prn edema. On in am and off at HS.   11.  Diet: 2 gm sodium cardiac diet, with additional diet modifications if ordered elsewhere.  12.  Fluid restriction:  1500cc/day, if hospital discharge sodium < 134.  13.  Dietician: CHF education  14.  PT/OT to Evaluate and Treat for deconditioning and CHF.  12.  Activity as tolerated   13.  PT to notify RN if wheelchair equipment has been modified (change in weight should also be noted on the patients weight calendar).    Heart Failure Follow-up Appointments and Instructions for TCU Discharge   _____An appointment to enroll the patient into [facility] may already have been made (see section  Your next  "appointments already scheduled ).  If there is a question about the appointment or you would like to speak with a C.O.R.E. nurse, please call one of the following locations:     [facility]):                                                    [phone]    [facility] ([place]):                                                   [phone]    [facility] ([place]):                   [phone]    _____If no C.O.R.E. appointment was made, please call one of the locations and schedule:    NP/PA appointment 14 days after hospital discharge if still in TCU    NP/PA appointment for 3-5 days after discharge from TCU    _____Have patient bring their med list and daily vitals/weight chart with them to appointment.    _____At discharge from the TCU give the patient the \"General Recommendations to Control Heart Failure When You Get Home  information for them to take home and their scale.  _____Upon discharge from the TCU reinforce the importance of home management of CHF including follow up in [facility].  What is the [facility]?  Cardiomyopathy  Optimization  Rehabilitation  Education  The [facility] is a heart failure specialty clinic within [facility] [place].  It is an outpatient disease management program that is based on a phase-by-phase approach, which is tailored to each patient s individual needs.  The cardiologist, nurse practitioner, physician assistant and nurses provide an ongoing outpatient care and treatment plan that guides heart failure and cardiomyopathy patients from evaluation and education to stabilization. This team works with the current primary care doctor and cardiologist to help patients:    Avoid hospitalizations    Slow the progression of the disease    Improve length and quality of life    Know who and when to call if heart failure symptoms appear    Receive easy access to quality health care and " "advice    Better understand your condition and treatment    Decrease the tremendous cost burden of heart failure care    Detect future heart problems before they become life threatening         Hospital Admission Date: 2017  ASHTYN STROUD   : 1955  Sex: Male        Attending Provider: Pili Bowers MD     Allergies:  Lisinopril    Infection:  None   Service:  CARDIOTHORAC    Ht:  1.727 m (5' 8\")   Wt:  74.9 kg (165 lb 2 oz)   Admission Wt:  83 kg (182 lb 15.7 oz)    BMI:  25.11 kg/m 2   BSA:  1.9 m 2            Patient PCP Information     Provider PCP Type    Dipak Gordillo MD General      ED Clinical Impression     Diagnosis Description Comment Added By Time Added    Acute bacterial endocarditis [I33.0] Acute bacterial endocarditis [I33.0]  Florence Juan PA-C 2018  1:37 PM    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 [I39] 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 [I39]  Florence Juan PA-C 2018  1:38 PM    Acute post-operative pain [G89.18] Acute post-operative pain [G89.18]  Benny Unger PA-C 1/3/2018  7:40 AM    Cerebrovascular accident (CVA) due to other mechanism (H) [I63.8] Cerebrovascular accident (CVA) due to other mechanism (H) [I63.8]  Benny Unger PA-C 1/3/2018  8:04 AM    Anxiety [F41.9] Anxiety [F41.9]  Benny Unger PA-C 1/3/2018  8:05 AM    Depression, unspecified depression type [F32.9] Depression, unspecified depression type [F32.9]  Benny Unger PA-C 1/3/2018  8:08 AM    Hypertension goal BP (blood pressure) < 140/90 [I10] Hypertension goal BP (blood pressure) < 140/90 [I10]  Benny Unger PA-C 1/3/2018  8:10 AM    Delirium due to another medical condition [F05] Delirium due to another medical condition [F05]  Benny Unger PA-C 1/3/2018  8:11 AM    Cardiomyopathy, unspecified type (H) [I42.9] Cardiomyopathy, unspecified type (H) [I42.9]  Benny Unger, " ROCHELLE 1/3/2018  8:12 AM      Hospital Problems as of 1/4/2018              Priority Class Noted POA    Heart failure (H) Medium  12/16/2017 Yes    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 Medium  12/19/2017 Yes      Non-Hospital Problems as of 1/4/2018              Priority Class Noted    Tobacco use disorder Medium  5/25/2006    Hypertension goal BP (blood pressure) < 140/90 High  1/29/2008    Disease of lung Medium  1/29/2008    Major depressive disorder, single episode, moderate (HCC) Medium  Unknown    Hyperlipidemia LDL goal <70 Medium  10/31/2010    Advance Care Planning Low  2/7/2012    Psoriasis Low  Unknown    COPD, mild (H) Medium  Unknown    CHF (congestive heart failure), NYHA class II (H) Medium  Unknown    AR (aortic regurgitation) Medium  Unknown    AI (aortic insufficiency) Medium  Unknown    Aortic root dilatation (H) Medium  Unknown    Health Care Home Medium  5/2/2016    Status post coronary angiogram Medium  5/6/2016    Cardiomyopathy (H) Medium  Unknown    S/P AVR (aortic valve replacement) Medium  5/23/2016    H/O aortic root repair Medium  5/23/2016    Anemia Medium  5/23/2016    Abscess of aortic root Medium  9/1/2017    CVA (cerebral vascular accident) (H) Medium  9/1/2017    Depression Medium  9/1/2017    Endocarditis Medium  10/6/2017    Encephalopathy Medium  10/17/2017    Anxiety Medium  Unknown      Code Status History     Date Active Date Inactive Code Status Order ID Comments User Context    1/4/2018 11:59 AM  Full Code [id number]  Florence Juan PA-C Outpatient    1/3/2018  8:42 AM 1/4/2018 11:59 AM Full Code [id number]  Benny Unger PA-C Outpatient    12/19/2017  5:44 PM 1/3/2018  8:42 AM Full Code [id number]  Onur Chávez MD Inpatient    12/17/2017 12:44 AM 12/19/2017  5:44 PM Full Code [id number]  Cong Lei MD Inpatient    10/6/2017  6:50 PM 11/4/2017  4:30 PM Full Code [id number]  Awais Ma MD Inpatient    5/17/2016  2:14 PM  5/22/2016  2:45 PM Full Code [id number]  Rosendo Gama MD Inpatient    4/27/2016  4:32 PM 5/17/2016  2:14 PM Full Code [id number]  Elma Silva MD Outpatient    4/25/2016  4:13 PM 4/27/2016  4:32 PM Full Code [id number]  Elma iSlva MD Inpatient         Medication Review      START taking        Dose / Directions Comments    bumetanide 1 MG tablet   Commonly known as:  BUMEX   Used for:  Cardiomyopathy, unspecified type (H)        Dose:  1 mg   Take 1 tablet (1 mg) by mouth 2 times daily   Quantity:  60 tablet   Refills:  0        Carboxymethylcellulose Sod PF 0.5 % Soln ophthalmic solution   Commonly known as:  REFRESH PLUS        Dose:  1 drop   Place 1 drop Into the left eye 3 times daily as needed (to flush debris from eye)   Quantity:  1 Bottle   Refills:  1        carvedilol 6.25 MG tablet   Commonly known as:  COREG   Used for:  Hypertension goal BP (blood pressure) < 140/90        Dose:  6.25 mg   Take 1 tablet (6.25 mg) by mouth 2 times daily (with meals)   Quantity:  60 tablet   Refills:  1        famotidine 20 MG tablet   Commonly known as:  PEPCID   Replaces:  famotidine 40 MG/5ML suspension        Dose:  20 mg   Take 1 tablet (20 mg) by mouth 2 times daily   Quantity:  60 tablet   Refills:  1        heparin sodium PF 5000 UNIT/0.5ML injection   Used for:  Acute bacterial endocarditis, Cerebrovascular accident (CVA) due to other mechanism (H)        Dose:  5000 Units   Inject 0.5 mLs (5,000 Units) Subcutaneous every 8 hours   Refills:  0        LORazepam 0.5 MG tablet   Commonly known as:  ATIVAN   Used for:  Anxiety        Dose:  0.5 mg   Take 1 tablet (0.5 mg) by mouth 2 times daily as needed for anxiety   Quantity:  60 tablet   Refills:  0        losartan 25 MG tablet   Commonly known as:  COZAAR   Used for:  Hypertension goal BP (blood pressure) < 140/90        Dose:  25 mg   Take 1 tablet (25 mg) by mouth every 12 hours   Quantity:  60 tablet   Refills:  1        multivitamin, therapeutic  with minerals Tabs tablet   Used for:  Cerebrovascular accident (CVA) due to other mechanism (H)   Replaces:  multivitamins with minerals Liqd liquid        Dose:  1 tablet   Start taking on:  1/5/2018   Take 1 tablet by mouth daily   Quantity:  30 each   Refills:  0        nafcillin 2 GM vial   Indication:  Endocarditis        Dose:  2 g   Inject 2 g into the vein every 4 hours for 3 days   Quantity:  240 mL   Refills:  0        OLANZapine 2.5 MG tablet   Commonly known as:  zyPREXA   Used for:  Cerebrovascular accident (CVA) due to other mechanism (H), Anxiety, Delirium due to another medical condition        Dose:  2.5 mg   Take 1 tablet (2.5 mg) by mouth 2 times daily   Quantity:  60 tablet   Refills:  0        ondansetron 4 MG ODT tab   Commonly known as:  ZOFRAN-ODT        Dose:  4 mg   Take 1 tablet (4 mg) by mouth every 6 hours as needed for nausea or vomiting   Quantity:  120 tablet   Refills:  1        oxyCODONE IR 5 MG tablet   Commonly known as:  ROXICODONE   Used for:  Acute post-operative pain        Dose:  5 mg   Take 1 tablet (5 mg) by mouth every 4 hours as needed for moderate to severe pain   Quantity:  40 tablet   Refills:  0        potassium chloride 20 MEQ/15ML (10%) solution   Commonly known as:  KAYCIEL   Used for:  Cardiomyopathy, unspecified type (H)   Replaces:  potassium chloride 20 MEQ Packet        Dose:  20 mEq   Take 15 mLs (20 mEq) by mouth 2 times daily   Quantity:  900 mL   Refills:  0        senna-docusate 8.6-50 MG per tablet   Commonly known as:  SENOKOT-S;PERICOLACE        Dose:  1-2 tablet   Take 1-2 tablets by mouth 2 times daily as needed for constipation   Quantity:  100 tablet   Refills:  0          CONTINUE these medications which may have CHANGED, or have new prescriptions. If we are uncertain of the size of tablets/capsules you have at home, strength may be listed as something that might have changed.        Dose / Directions Comments    acetaminophen 32 mg/mL solution    Commonly known as:  TYLENOL   This may have changed:    - how to take this  - when to take this  - reasons to take this   Used for:  Acute post-operative pain        Dose:  650 mg   20.3 mLs (650 mg) by Per NG tube route every 4 hours as needed for mild pain   Quantity:  400 mL   Refills:  1        atorvastatin 40 MG tablet   Commonly known as:  LIPITOR   This may have changed:  See the new instructions.   Used for:  Cerebrovascular accident (CVA) due to other mechanism (H)        Dose:  40 mg   Take 1 tablet (40 mg) by mouth daily   Quantity:  60 tablet   Refills:  1        mirtazapine 30 MG tablet   Commonly known as:  REMERON   This may have changed:  See the new instructions.   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)        Dose:  1 packet   1 packet by Per Feeding Tube route 3 times daily   Refills:  0        ketotifen 0.025 % Soln ophthalmic solution   Commonly known as:  ZADITOR/REFRESH ANTI-ITCH        Dose:  1 drop   Place 1 drop Into the left eye 3 times daily   Quantity:  1 Bottle   Refills:  0        lactobacillus Tabs        Dose:  1 tablet   Take 1 tablet by mouth 3 times daily   Refills:  0        loperamide 2 MG tablet   Commonly known as:  IMODIUM A-D        2 tab by g tube 3 times daily PRN for diarrhea   Quantity:  30 tablet   Refills:  0        Menthol-Zinc Oxide 0.44-20.6 % Oint        1 application topically TID PRN for perianal irritation   Refills:  0        Miconazole Nitrate  2 % Powd        Apply in folds   Refills:  0        rifampin 25 mg/mL Susp   Commonly known as:  REIFADEN   Indication:  Abscess   Used for:  Acute bacterial endocarditis        Dose:  300 mg   12 mLs (300 mg) by Oral or Feeding Tube route 2 times daily for 3 days   Quantity:  120 mL   Refills:  0        SYSTANE NIGHTTIME Oint        Refills:  0          STOP taking     amLODIPine 5 MG tablet   Commonly known as:  NORVASC           ARIPiprazole 2 MG tablet   Commonly known as:  ABILIFY           D5W 1,000 mL with nafcillin 12 g continuous infusion           famotidine 40 MG/5ML suspension   Commonly known as:  PEPCID   Replaced by:  famotidine 20 MG tablet           hydrochlorothiazide 25 MG tablet   Commonly known as:  HYDRODIURIL           labetalol 100 MG tablet   Commonly known as:  NORMODYNE           moxifloxacin 0.5 % ophthalmic solution   Commonly known as:  VIGAMOX           multivitamins with minerals Liqd liquid   Replaced by:  multivitamin, therapeutic with minerals Tabs tablet           ondansetron 4 MG tablet   Commonly known as:  ZOFRAN           polyethylene glycol Packet   Commonly known as:  MIRALAX/GLYCOLAX           potassium chloride 20 MEQ Packet   Commonly known as:  KLOR-CON   Replaced by:  potassium chloride 20 MEQ/15ML (10%) solution           QUEtiapine 25 MG tablet   Commonly known as:  SEROquel           venlafaxine 100 MG tablet   Commonly known as:  EFFEXOR                     Further instructions from your care team       [facility]      AFTER YOU GO HOME FROM YOUR HEART SURGERY    You had a full sternotomy, so avoid lifting anything greater than ten pounds for 6 weeks after surgery and then less than 20 pounds for an additional 6 weeks.    No driving for 4 weeks after surgery or while on pain medication. If you had a minimally invasive procedure.      Avoid strenuous activities such as bowling, vacuuming, raking, shoveling, golf or tennis for 12 weeks  after your surgery. It is okay to resume sex if you feel comfortable in doing so. You may have to try different positions with your partner.     Splint your chest incision by hugging a pillow or bringing your arms across your chest when coughing or sneezing. Avoid pushing off with your arms when getting up for the first month if you have had your sternum opened.    Shower or wash your incisions daily with soap and water (or as instructed), pat dry. Keep wound clean and dry, showers are okay after discharge, but don't let spray hit directly on incision. No baths or swimming for 1 month.  Clean wounds twice a day for 2 weeks with microklenz spray if available. Cover chest tube sites with gauze until they stop draining, then leave open. It is not abnormal for chest tube sites to drain yellowish/clear fluid for up to 2-3 weeks after surgery.   Watch for signs of infection: increased redness, tenderness, warmth or any drainage that appears infected (pus like) or is persistent.  Also a temperature > 100.5 F or chills. Call your surgeon or primary care provider's office immediately. Remove any skin glue left on incisions after 10 days. This will not affect your incision and can speed up healing.    Exercise is very important in your recovery. Please follow the guidelines set up for you in your cardiac rehab classes at the hospital. If outpatient cardiac rehab was ordered for you, we highly recommend you participate. If you have problems arranging your cardiac rehab, please call [phone].     Avoid sitting for prolonged periods of time, try to walk every hour during the day. If you have a leg incision, elevate your leg often when you are not walking.    Check your weight when you get home from the hospital and continue to check it daily through your recovery for at least a month. If you notice a weight gain of 2-3 pounds in a week, notify your primary care physician, cardiologist or surgeon.    Bowel activity may be  slow after surgery. If necessary, you may take an over the counter laxative such as Milk of Magnesia or Miralax. You may have stool softeners prescribed (docusate sodium, Senokot). We recommend using stool softeners while using narcotics for pain (oxycodone/percocet, hydrocodone/vicodin).      DENTAL VISITS AFTER SURGERY  If you have had your heart valve repaired or replaced, we do not recommend having any dental work done for 6 months and you will need to take an antibiotic prior to dental visits from now on.  Please notify your dentist before any procedure for the proper treatment needed. The antibiotic is taken by mouth one hour prior to visit. This includes routine cleanings.    DO NOT SMOKE.  IF YOU NEED HELP QUITTING, PLEASE TALK WITH YOUR CARDIOLOGIST OR PRIMARY DOCTOR.    If you are on a blood thinner, follow the instructions you were given in the hospital and DO NOT SKIP this medication. Try and take it the same time everyday. Your primary care physician or coumadin clinic will manage the dosing.     If you have an LVAD device call your LVAD coordinator.   If you had a heart transplant call your Transplant Coordinator.    REGARDING PRESCRIPTION REFILLS.  If you need a refill on your pain medication contact us.  All other medications will be adjusted, discontinued and re-filled by your primary care physician and/or your cardiologist as they were prior to your surgery. We have given you enough for one to three month with possibly one refill.    POST-OPERATIVE CLINIC VISITS  You have a follow up visit with CVTS Surgery Advance Care Practitioners in 1-2 weeks at the [facility].  You will then return to the care of your primary physician and your cardiologist.   It is important to call for an appointment to see your cardiologist in 4-6 weeks after surgery and your primary care physician in 2-4 weeks after surgery.   If there is a need to return to see CT Surgery please call our   at [phone].    SURGICAL QUESTIONS  Please call Kristy Rubi with any surgical questions, her phone number is listed below.  She can assist you with your needs and contact other surgery care team members as indicated.    For general questions or concerns, please call the Cardiothoracic Surgery Department at [phone] 8-4:30 M-F.   On weekends or after hours, please call [phone] and ask the  to   page the Cardiothoracic Surgery fellow on call.      Thank you,    Your Cardiothoracic Surgery Team  Kristy Rubi RN Care Coordinator-  [phone]   Florence Unger PA-C                        Summary of Visit     Reason for your hospital stay       You had your prosthetic aortic valve replaced             After Care     Activity       Activity as tolerated with sternal precautions. No lifting more than 10 pounds for first 6 weeks, then no lifting more than 20 pounds for an additional 6 weeks. Avoid lifting arms above shoulders. After these 12 weeks, activity as tolerated with pain. Avoid strenuous activities such as bowling, vacuuming, raking, shoveling, golf or tennis for 12 weeks after your surgery.    No driving for 4 weeks. If you continue to take narcotics after 4 weeks, no driving until you are off narcotics.       Cardiac sternal precautions           Daily weights       Call Provider for weight gain of more than 2 pounds per day or 5 pounds per week.       Diet       Fluid restriction 2000 ML FLUID       Regular diet with thin liquids with supervision/assist      Snacks/Supplements Adult: With Meals      Adult Formula Bolus Feedin cans daily bolused at 10:00 and 16:00 or timing per pt preference, of TwoCal HN; Route: Gastrostomy; 2; Can(s); Medication - Tube Feeding Flush Frequency: At least 15-30 mL water before and after medication administration and with tube clogging.       General info for SNF       Length of Stay Estimate: Short Term  Care: Estimated # of Days <30  Condition at Discharge: Stable  Level of care:skilled   Rehabilitation Potential: Fair  Admission H&P remains valid and up-to-date: Yes  Recent Chemotherapy: N/A  Use Nursing Home Standing Orders: Yes       Intake and output       Every shift       Mantoux instructions       Give two-step Mantoux (PPD) Per Facility Policy Yes       Monitor and record       blood pressure daily  pulse daily  weight every day       Weight bearing status       Requires full lift       Wound care and dressings       You have dissolvable sutures under your skin that do not need to be removed.  Pat wound dressing dry after showers.  Skin glue will eventually fall off in 1-2 weeks.     Keep wound clean and dry, showers are okay after discharge, but don't let spray hit directly on incision. No baths or swimming for 1 month.  Clean wounds twice a day for 2 weeks with microklenz spray if available. Cover chest tube sites with gauze until they stop draining, then leave open.             Referrals     Medication Therapy Management Referral       Reason for referral:  on more than 5 medications and managing chronic disease and on more than 10 medications and hospitalized or in the ED in the past 6 months     This service is designed to help you get the most from your medications.  A specially trained pharmacist will work closely with you and your doctors  to solve any problems related to your medications and to help you get the   best results from taking them.      The Medication Therapy Management staff will call you to schedule an appointment.       Nutrition Services Adult IP Consult       Reason:  Tube feeding, calorie counts.       Occupational Therapy Adult Consult       Evaluate and treat as clinically indicated.    Reason:   S/p redo sternotomy with homograft with ed CVA       Physical Therapy Adult Consult       Evaluate and treat as clinically indicated.    Reason:  S/p redo sternotomy with  homograft with ed CVA       Speech Language Path Adult Consult       Evaluate and treat as clinically indicated.    Reason:   S/p redo sternotomy with homograft with ed CVA             Supplies     AntiEmbolism Stockings       Bilateral below knee length.On in the morning, off at night             Your next 10 appointments already scheduled     Jan 12, 2018 11:45 AM CST   LAB with RU LAB   [facility] [place] ([facility])    [address]  [facility] [place] [address]   [phone]           Please do not eat 10-12 hours before your appointment if you are coming in fasting for labs on lipids, cholesterol, or glucose (sugar). This does not apply to pregnant women. Water, hot tea and black coffee (with nothing added) are okay. Do not drink other fluids, diet soda or chew gum.            Jan 12, 2018 12:45 PM CST   Return Visit with Alfredito Garzon MD   [facility] ([facility])    [address]  [facility] [address]   [phone]            Jan 19, 2018  1:30 PM CST   (Arrive by 1:15 PM)   Return Visit with  CVTS   [facility] ([facility])    [address] [place] [address]  [address] [place] [id number] [phone]              Follow-Up Appointment Instructions     Future Labs/Procedures    Adult [facility] Follow-up and recommended labs and tests     Comments:    Follow up with your primary care provider, Dipak Gordillo, within 3-5 days of discharge to evaluate after surgery and for hospital follow- up. The following labs/tests are recommended: CBC, BMP.    Follow-up with Dr. Garzon, your Cardiologist in [place], on January 12, at 12:45pm, labs at 11:45. He will refer you to  [facility].   Follow up with cardiothoracic surgery on Friday January 19th, 2018 at 1:30pm. This follow-up appointment will be listed on your after visit summary paperwork  when you leave the hospital. If you have questions, call [phone].    Appointments on [place] and/or [facility] (with [facility] or [facility] provider or service). Call [phone] if you haven't heard regarding these appointments within 7 days of discharge.      Follow-Up Appointment Instructions     Adult [facility] Follow-up and recommended labs and tests       Follow up with your primary care provider, Dipak Gordillo, within 3-5 days of discharge to evaluate after surgery and for hospital follow- up. The following labs/tests are recommended: CBC, BMP.    Follow-up with Dr. Garzon, your Cardiologist in [place], on January 12, at 12:45pm, labs at 11:45. He will refer you to  [facility].   Follow up with cardiothoracic surgery on Friday January 19th, 2018 at 1:30pm. This follow-up appointment will be listed on your after visit summary paperwork when you leave the hospital. If you have questions, call [phone].    Appointments on [place] and/or [facility] (with [facility] or [facility] provider or service). Call [phone] if you haven't heard regarding these appointments within 7 days of discharge.             Statement of Approval     Ordered          01/04/18 1212  I have reviewed and agree with all the recommendations and orders detailed in this document.  EFFECTIVE NOW     Approved and electronically signed by:  Florence Juan PA-C           01/03/18 1042  I have reviewed and agree with all the recommendations and orders detailed in this document.  EFFECTIVE NOW     Approved and electronically signed by:  Benny Unger PA-C               General Recommendations To Control Heart Failure When You Get Home (Give at DC from TCU)       Heart Failure Instructions for Patients and Families: Please read and check off each of these important instructions as you do them when you get home.          Weight and Symptoms       ___ Put a scale in your bathroom.    ___ Post a weight chart or calendar next to your  scale.    ___ Weigh yourself everyday as soon as you get up in the morning (before breakfast).  You should only be wearing your pajamas.  Write your weight on the chart/calendar.  ___ Bring your weight chart/calendar with you to all appointments.  ___ Call your doctor or nurse practitioner if you gain 2 pounds (in 1 day) or 5 pounds in (1 week) from your goal  good  weight.  Your good weight is also called your  dry  weight.  Your doctor or nurse will tell you what your good weight should be.    ___ Call your doctor or nurse practitioner if you have shortness of breath that gets worse over time, leg swelling or fatigue.       Medications and Diet       ___ Make sure to take your medication as prescribed.    ___ Bring a current list of your medication and all of your medicine bottles with you to all appointments.    ___ Limit fluids if you still have swelling or shortness of breath, or if your doctor tells you to do so.    ___ Eat less than 2000 mg of sodium (salt) every day. Read food labels, and do not add salt to meals. Remember, if you eat less salt you retain less fluid.  ___ Follow a heart healthy diet that is low in saturated fat.        Activity and Suggested Lifestyle Changes      ___ Stay active. Talk to your doctor about an exercise program that is safe for your heart.  ___ Stop smoking. Reduce alcohol use.      ___ Lose weight if you are overweight. Extra weight puts a lot of stress on the heart.                 Control for Leg Swelling     ___ Keep your legs elevated (raised) as needed for swelling. If swelling is uncomfortable or elevation doesn t help, ask your doctor about using ACE wraps or support stockings.        What is the [facility]?  Cardiomyopathy  Optimization  Rehabilitation  Education    [facility] is a heart failure specialty clinic within [facility].  It is an outpatient disease management program that is based on a phase-by-phase approach, which is tailored to  each patient s individual needs.  The cardiologist, nurse practitioner, physician assistant and nurses provide an ongoing outpatient care and treatment plan that guides heart failure and cardiomyopathy patients from evaluation and education to stabilization. This team works with your current primary care doctor and cardiologist to help you:    - Avoid hospitalizations  - Slow the progression of the disease  - Improve length and quality of life  - Know who and when to call if heart failure symptoms appear  - Receive easy access to quality health care and advice  - Better understand your condition and treatment  - Decrease the tremendous cost burden of heart failure care  - Detect future heart problems before they become life threatening                                 Follow-up Appointments     ___ An appointment with the [facility] may already have been made for you (see section   Your next appointments already scheduled ).  If you have a question about your appointment, would like to speak with a [facility] nurse, or would like to become a [facility] patient, please call one of the following locations:     - [facility]):                                             [phone]  - [facility]):                                            [phone]  - [facility]):                 [phone]      ___ Your [facility] Team will continue to educate you on your heart failure and may adjust medications based on your vital signs, lab work, and how you are feeling.  Therefore, it is very important to bring the following to all [facility] appointments:    - An accurate list of your medications  - Your medicine bottles  - Your weight chart/calendar

## 2017-12-16 NOTE — IP AVS SNAPSHOT
MRN:[id number]                      After Visit Summary   12/16/2017    Cricket Miguel    MRN: [id number]           Thank you!     Thank you for choosing [facility] for your care. Our goal is always to provide you with excellent care. Hearing back from our patients is one way we can continue to improve our services. Please take a few minutes to complete the written survey that you may receive in the mail after you visit with us. Thank you!        Patient Information     Date Of Birth          1955        Designated Caregiver       Most Recent Value    Caregiver    Will someone help with your care after discharge? 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  For non-urgent questions about your medical care, please call your primary care provider or clinic, [phone]  For questions related to your surgery, please call your surgery clinic        Attending Provider     Provider Tacho Nogueira MD Cardiology    Pili Bowers MD Thoracic Diseases       Primary Care Provider Office Phone # Fax #    Dipak Gordillo [phone]       When to contact your care team       Call your primary doctor or surgery team if you have any of the following: temperature greater than 101 F,  increased shortness of breath, increased drainage, increased swelling or increased pain. Please call if you have any weight gain more than 3 pounds overnight or 5 pounds in a week.                  After Care Instructions     Activity       Activity as tolerated with sternal  precautions. No lifting more than 10 pounds for first 6 weeks, then no lifting more than 20 pounds for an additional 6 weeks. Avoid lifting arms above shoulders. After these 12 weeks, activity as tolerated with pain. Avoid strenuous activities such as bowling, vacuuming, raking, shoveling, golf or tennis for 12 weeks after your surgery.    No driving for 4 weeks. If you continue to take narcotics after 4 weeks, no driving until you are off narcotics.            Cardiac sternal precautions           Daily weights       Call Provider for weight gain of more than 2 pounds per day or 5 pounds per week.            Diet       Fluid restriction 1500cc FLUID per day       Regular diet with thin liquids with supervision/assist, goal 2 gm sodium cardiac diet      Adult Formula Bolus Feedin cans daily bolused at 10:00 and 16:00 or timing per pt preference, of TwoCal HN; Route: Gastrostomy; 2; Can(s); Medication - Tube Feeding Flush Frequency: At least 15-30 mL water before and after medication administration and with tube clogging.            General info for SNF       Length of Stay Estimate: Short Term Care: Estimated # of Days <30  Condition at Discharge: Stable  Level of care:skilled   Rehabilitation Potential: Fair  Admission H&P remains valid and up-to-date: Yes  Recent Chemotherapy: N/A  Use Nursing Home Standing Orders: Yes            Intake and output       Every shift            Mantoux instructions       Give two-step Mantoux (PPD) Per Facility Policy Yes            Monitor and record       blood pressure daily  pulse daily  weight every day            Weight bearing status       Requires full lift            Wound care and dressings       You have dissolvable sutures under your skin that do not need to be removed.  Pat wound dressing dry after showers.  Skin glue will eventually fall off in 1-2 weeks.     Keep wound clean and dry, showers are okay after discharge, but don't let spray hit directly on incision.  No baths or swimming for 1 month.  Clean wounds twice a day for 2 weeks with microklenz spray if available. Cover chest tube sites with gauze until they stop draining, then leave open.                  Follow-up Appointments     Adult [facility] Follow-up and recommended labs and tests       Follow up with your primary care provider, Dipak Gordillo, within 3-5 days of discharge to evaluate after surgery and for hospital follow- up. The following labs/tests are recommended: CBC, BMP.    Follow-up with Dr. Garzon, your Cardiologist in [place], on January 12, at 12:45pm, labs at 11:45. He will refer you to  [facility].   Follow up with cardiothoracic surgery on Friday January 19th, 2018 at 1:30pm. This follow-up appointment will be listed on your after visit summary paperwork when you leave the hospital. If you have questions, call [phone].    Appointments on [place] and/or [facility] (with [facility] provider or service). Call [phone] if you haven't heard regarding these appointments within 7 days of discharge.                  Your next 10 appointments already scheduled     Jan 12, 2018 11:45 AM CST   LAB with RU LAB   [facility] [place] ([facility])    [address]  [facility] [id number] [phone]           Please do not eat 10-12 hours before your appointment if you are coming in fasting for labs on lipids, cholesterol, or glucose (sugar). This does not apply to pregnant women. Water, hot tea and black coffee (with nothing added) are okay. Do not drink other fluids, diet soda or chew gum.            Jan 12, 2018 12:45 PM CST   Return Visit with Alfredito Garzon MD   [facility] ([facility])    [address]  [place] [id number] [phone]            Jan 19, 2018  1:30 PM CST   (Arrive by 1:15 PM)   Return Visit with  CVTS   [facility] ([facility])    [address]  [address]  [place] [address] [phone]              Additional Services     Medication Therapy Management Referral       Reason for referral:  on more than 5 medications and managing chronic disease and on more than 10 medications and hospitalized or in the ED in the past 6 months     This service is designed to help you get the most from your medications.  A specially trained pharmacist will work closely with you and your doctors  to solve any problems related to your medications and to help you get the   best results from taking them.      The Medication Therapy Management staff will call you to schedule an appointment.            Nutrition Services Adult IP Consult       Reason:  Tube feeding, calorie counts.            Occupational Therapy Adult Consult       Evaluate and treat as clinically indicated.    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    No driving for 4 weeks after surgery or while on pain medication. If you had a minimally invasive procedure.      Avoid strenuous activities such as bowling, vacuuming, raking, shoveling, golf or tennis for 12 weeks  after your surgery. It is okay to resume sex if you feel comfortable in doing so. You may have to try different positions with your partner.     Splint your chest incision by hugging a pillow or bringing your arms across your chest when coughing or sneezing. Avoid pushing off with your arms when getting up for the first month if you have had your sternum opened.    Shower or wash your incisions daily with soap and water (or as instructed), pat dry. Keep wound clean and dry, showers are okay after discharge, but don't let spray hit directly on incision. No baths or swimming for 1 month.  Clean wounds twice a day for 2 weeks with microklenz spray if available. Cover chest tube sites with gauze until they stop draining, then leave open. It is not abnormal for chest tube sites to drain yellowish/clear fluid for up to 2-3 weeks after surgery.   Watch for signs of infection: increased redness, tenderness, warmth or any drainage that appears infected (pus like) or is persistent.  Also a temperature > 100.5 F or chills. Call your surgeon or primary care provider's office immediately. Remove any skin glue left on incisions after 10 days. This will not affect your incision and can speed up healing.    Exercise is very important in your recovery. Please follow the guidelines set up for you in your cardiac rehab classes at the hospital. If outpatient cardiac rehab was ordered for you, we highly recommend you participate. If you have problems arranging your cardiac rehab, please call [phone].     Avoid sitting for prolonged periods of time, try to walk every hour during the day. If you have a leg incision, elevate your leg often when you are not walking.    Check your weight when you get home from the hospital and continue to check it daily through your recovery for at least a month. If you notice a weight gain of 2-3 pounds in a week, notify your primary care physician, cardiologist or surgeon.    Bowel activity may be  slow after surgery. If necessary, you may take an over the counter laxative such as Milk of Magnesia or Miralax. You may have stool softeners prescribed (docusate sodium, Senokot). We recommend using stool softeners while using narcotics for pain (oxycodone/percocet, hydrocodone/vicodin).      DENTAL VISITS AFTER SURGERY  If you have had your heart valve repaired or replaced, we do not recommend having any dental work done for 6 months and you will need to take an antibiotic prior to dental visits from now on.  Please notify your dentist before any procedure for the proper treatment needed. The antibiotic is taken by mouth one hour prior to visit. This includes routine cleanings.    DO NOT SMOKE.  IF YOU NEED HELP QUITTING, PLEASE TALK WITH YOUR CARDIOLOGIST OR PRIMARY DOCTOR.    If you are on a blood thinner, follow the instructions you were given in the hospital and DO NOT SKIP this medication. Try and take it the same time everyday. Your primary care physician or coumadin clinic will manage the dosing.     If you have an LVAD device call your LVAD coordinator.   If you had a heart transplant call your Transplant Coordinator.    REGARDING PRESCRIPTION REFILLS.  If you need a refill on your pain medication contact us.  All other medications will be adjusted, discontinued and re-filled by your primary care physician and/or your cardiologist as they were prior to your surgery. We have given you enough for one to three month with possibly one refill.    POST-OPERATIVE CLINIC VISITS  You have a follow up visit with CVTS Surgery Advance Care Practitioners in 1-2 weeks at the [facility].  You will then return to the care of your primary physician and your cardiologist.   It is important to call for an appointment to see your cardiologist in 4-6 weeks after surgery and your primary care physician in 2-4 weeks after surgery.   If there is a need to return to see CT Surgery please call our   at [phone].    SURGICAL QUESTIONS  Please call Kristy Rubi with any surgical questions, her phone number is listed below.  She can assist you with your needs and contact other surgery care team members as indicated.    For general questions or concerns, please call the Cardiothoracic Surgery Department at [phone] 8-4:30 M-F.   On weekends or after hours, please call [phone] and ask the  to   page the Cardiothoracic Surgery fellow on call.      Thank you,    Your Cardiothoracic Surgery Team  Kristy Rubi RN Care Coordinator-  [phone]   Florence Unger PA-C                        General Recommendations To Control Heart Failure When You Get Home       Heart Failure Instructions for Patients and Families: Please read and check off each of these important instructions as you do them when you get home.          Weight and Symptoms       ___ Put a scale in your bathroom.    ___ Post a weight chart or calendar next to your scale.    ___ Weigh yourself everyday as soon as you get up in the morning (before breakfast).  You should only be wearing your pajamas.  Write your weight on the chart/calendar.  ___ Bring your weight chart/calendar with you to all appointments.  ___ Call your doctor or nurse practitioner if you gain 2 pounds (in 1 day) or 5 pounds in (1 week) from your goal  good  weight.  Your good weight is also called your  dry  weight.  Your doctor or nurse will tell you what your good weight should be.    ___ Call your doctor or nurse practitioner if you have shortness of breath that gets worse over time, leg swelling or fatigue.       Medications and Diet       ___ Make sure to take your medication as prescribed.    ___ Bring a current list of your medication and all of your medicine bottles with you to all appointments.    ___ Limit fluids if you still have swelling or shortness of breath, or if your doctor tells you to do so.    ___ Eat  less than 2000 mg of sodium (salt) every day. Read food labels, and do not add salt to meals. Remember, if you eat less salt you retain less fluid.  ___ Follow a heart healthy diet that is low in saturated fat.        Activity and Suggested Lifestyle Changes      ___ Stay active. Talk to your doctor about an exercise program that is safe for your heart.  ___ Stop smoking. Reduce alcohol use.      ___ Lose weight if you are overweight. Extra weight puts a lot of stress on the heart.                 Control for Leg Swelling     ___ Keep your legs elevated (raised) as needed for swelling. If swelling is uncomfortable or elevation doesn t help, ask your doctor about using ACE wraps or support stockings.        What is the [facility]?  Cardiomyopathy  Optimization  Rehabilitation  Education    The [facility] is a heart failure specialty clinic within [facility].  It is an outpatient disease management program that is based on a phase-by-phase approach, which is tailored to each patient s individual needs.  The cardiologist, nurse practitioner, physician assistant and nurses provide an ongoing outpatient care and treatment plan that guides heart failure and cardiomyopathy patients from evaluation and education to stabilization. This team works with your current primary care doctor and cardiologist to help you:    - Avoid hospitalizations  - Slow the progression of the disease  - Improve length and quality of life  - Know who and when to call if heart failure symptoms appear  - Receive easy access to quality health care and advice  - Better understand your condition and treatment  - Decrease the tremendous cost burden of heart failure care  - Detect future heart problems before they become life threatening                                 Follow-up Appointments     ___ An appointment with the [facility] may already have been made for you (see section   Your next appointments already scheduled ).  If  you have a question about your appointment, would like to speak with a [facility] nurse, or would like to become a [facility] patient, please call one of the following locations:     - [facility] ([place]):                                             [phone]  - [facility] ([place]):                                            [phone]  - [facility] ([place]):                 [phone]      ___ Your [facility] Clinic Team will continue to educate you on your heart failure and may adjust medications based on your vital signs, lab work, and how you are feeling.  Therefore, it is very important to bring the following to all [facility] appointments:    - An accurate list of your medications  - Your medicine bottles  - Your weight chart/calendar                   Pending Results     Date and Time Order Name Status Description    12/19/2017 1150 Fungus Culture, non-blood Preliminary     12/19/2017 1141 Fungus Culture, non-blood Preliminary     12/19/2017 1124 Fungus Culture, non-blood Preliminary             Statement of Approval     Ordered          01/04/18 1212  I have reviewed and agree with all the recommendations and orders detailed in this document.  EFFECTIVE NOW     Approved and electronically signed by:  Florence Juan PA-C           01/03/18 1042  I have reviewed and agree with all the recommendations and orders detailed in this document.  EFFECTIVE NOW     Approved and electronically signed by:  Benny Unger PA-C             Admission Information     Date & Time Provider Department Dept. Phone    12/16/2017 Pili Bowers MD [address] [facility] [phone]      Your Vitals Were     Blood Pressure Pulse Temperature Respirations Weight Pulse Oximetry    111/71 (BP Location: Left arm) 68 98  F (36.7  C) (Oral) 18 74.9 kg (165 lb 2 oz) 97%    BMI (Body Mass Index)                   25.11 kg/m2           MyChart  Information     [facility] gives you secure access to your electronic health record. If you see a primary care provider, you can also send messages to your care team and make appointments. If you have questions, please call your primary care clinic.  If you do not have a primary care provider, please call [phone] and they will assist you.        Care EveryWhere ID     This is your Care EveryWhere ID. This could be used by other organizations to access your [facility] medical records  [id number]        Equal Access to Services     DANIEL BILLS : Hadii aad ku hadasho Soomaali, waaxda luqadaha, qaybta kaalmada adeegyada, waxay joce rodríguez . So [facility] [phone].    ATENCIÓN: Si habla español, tiene a mendiola disposición servicios gratuitos de asistencia lingüística. [facility] [phone].    We comply with applicable federal civil rights laws and [place] laws. We do not discriminate on the basis of race, color, national origin, age, disability, sex, sexual orientation, or gender identity.               Review of your medicines      START taking        Dose / Directions    bumetanide 1 MG tablet   Commonly known as:  BUMEX   Used for:  Cardiomyopathy, unspecified type (H)        Dose:  1 mg   Take 1 tablet (1 mg) by mouth 2 times daily   Quantity:  60 tablet   Refills:  0       Carboxymethylcellulose Sod PF 0.5 % Soln ophthalmic solution   Commonly known as:  REFRESH PLUS        Dose:  1 drop   Place 1 drop Into the left eye 3 times daily as needed (to flush debris from eye)   Quantity:  1 Bottle   Refills:  1       carvedilol 6.25 MG tablet   Commonly known as:  COREG   Used for:  Hypertension goal BP (blood pressure) < 140/90        Dose:  6.25 mg   Take 1 tablet (6.25 mg) by mouth 2 times daily (with meals)   Quantity:  60 tablet   Refills:  1       famotidine 20 MG tablet   Commonly known as:  PEPCID   Replaces:  famotidine 40 MG/5ML suspension        Dose:  20 mg   Take 1 tablet (20 mg) by  mouth 2 times daily   Quantity:  60 tablet   Refills:  1       heparin sodium PF 5000 UNIT/0.5ML injection   Used for:  Acute bacterial endocarditis, Cerebrovascular accident (CVA) due to other mechanism (H)        Dose:  5000 Units   Inject 0.5 mLs (5,000 Units) Subcutaneous every 8 hours   Refills:  0       LORazepam 0.5 MG tablet   Commonly known as:  ATIVAN   Used for:  Anxiety        Dose:  0.5 mg   Take 1 tablet (0.5 mg) by mouth 2 times daily as needed for anxiety   Quantity:  60 tablet   Refills:  0       losartan 25 MG tablet   Commonly known as:  COZAAR   Used for:  Hypertension goal BP (blood pressure) < 140/90        Dose:  25 mg   Take 1 tablet (25 mg) by mouth every 12 hours   Quantity:  60 tablet   Refills:  1       multivitamin, therapeutic with minerals Tabs tablet   Used for:  Cerebrovascular accident (CVA) due to other mechanism (H)   Replaces:  multivitamins with minerals Liqd liquid        Dose:  1 tablet   Start taking on:  1/5/2018   Take 1 tablet by mouth daily   Quantity:  30 each   Refills:  0       nafcillin 2 GM vial   Indication:  Endocarditis        Dose:  2 g   Inject 2 g into the vein every 4 hours for 3 days   Quantity:  240 mL   Refills:  0       OLANZapine 2.5 MG tablet   Commonly known as:  zyPREXA   Used for:  Cerebrovascular accident (CVA) due to other mechanism (H), Anxiety, Delirium due to another medical condition        Dose:  2.5 mg   Take 1 tablet (2.5 mg) by mouth 2 times daily   Quantity:  60 tablet   Refills:  0       ondansetron 4 MG ODT tab   Commonly known as:  ZOFRAN-ODT        Dose:  4 mg   Take 1 tablet (4 mg) by mouth every 6 hours as needed for nausea or vomiting   Quantity:  120 tablet   Refills:  1       oxyCODONE IR 5 MG tablet   Commonly known as:  ROXICODONE   Used for:  Acute post-operative pain        Dose:  5 mg   Take 1 tablet (5 mg) by mouth every 4 hours as needed for moderate to severe pain   Quantity:  40 tablet   Refills:  0       potassium  chloride 20 MEQ/15ML (10%) solution   Commonly known as:  KAYCIEL   Used for:  Cardiomyopathy, unspecified type (H)   Replaces:  potassium chloride 20 MEQ Packet        Dose:  20 mEq   Take 15 mLs (20 mEq) by mouth 2 times daily   Quantity:  900 mL   Refills:  0       senna-docusate 8.6-50 MG per tablet   Commonly known as:  SENOKOT-S;PERICOLACE        Dose:  1-2 tablet   Take 1-2 tablets by mouth 2 times daily as needed for constipation   Quantity:  100 tablet   Refills:  0         CONTINUE these medicines which may have CHANGED, or have new prescriptions. If we are uncertain of the size of tablets/capsules you have at home, strength may be listed as something that might have changed.        Dose / Directions    acetaminophen 32 mg/mL solution   Commonly known as:  TYLENOL   This may have changed:    - how to take this  - when to take this  - reasons to take this   Used for:  Acute post-operative pain        Dose:  650 mg   20.3 mLs (650 mg) by Per NG tube route every 4 hours as needed for mild pain   Quantity:  400 mL   Refills:  1       atorvastatin 40 MG tablet   Commonly known as:  LIPITOR   This may have changed:  See the new instructions.   Used for:  Cerebrovascular accident (CVA) due to other mechanism (H)        Dose:  40 mg   Take 1 tablet (40 mg) by mouth daily   Quantity:  60 tablet   Refills:  1       mirtazapine 30 MG tablet   Commonly known as:  REMERON   This may have changed:  See the new instructions.   Used for:  Depression, unspecified depression type        Dose:  30 mg   Take 1 tablet (30 mg) by mouth At Bedtime   Quantity:  30 tablet   Refills:  1         CONTINUE these medicines which have NOT CHANGED        Dose / Directions    aspirin 81 MG chewable tablet   Used for:  Cerebrovascular accident (CVA) due to other mechanism (H)        Dose:  81 mg   1 tablet (81 mg) by Oral or Feeding Tube route daily   Quantity:  60 tablet   Refills:  1       fiber modular packet   Used for:  Cerebral  infarction due to embolism of precerebral artery (H)        Dose:  1 packet   1 packet by Per Feeding Tube route 3 times daily   Refills:  0       ketotifen 0.025 % Soln ophthalmic solution   Commonly known as:  ZADITOR/REFRESH ANTI-ITCH        Dose:  1 drop   Place 1 drop Into the left eye 3 times daily   Quantity:  1 Bottle   Refills:  0       lactobacillus Tabs        Dose:  1 tablet   Take 1 tablet by mouth 3 times daily   Refills:  0       loperamide 2 MG tablet   Commonly known as:  IMODIUM A-D        2 tab by g tube 3 times daily PRN for diarrhea   Quantity:  30 tablet   Refills:  0       Menthol-Zinc Oxide 0.44-20.6 % Oint        1 application topically TID PRN for perianal irritation   Refills:  0       Miconazole Nitrate 2 % Powd        Apply in folds   Refills:  0       rifampin 25 mg/mL Susp   Commonly known as:  REIFADEN   Indication:  Abscess   Used for:  Acute bacterial endocarditis        Dose:  300 mg   12 mLs (300 mg) by Oral or Feeding Tube route 2 times daily for 3 days   Quantity:  120 mL   Refills:  0       SYSTANE NIGHTTIME Oint        Refills:  0         STOP taking     amLODIPine 5 MG tablet   Commonly known as:  NORVASC           ARIPiprazole 2 MG tablet   Commonly known as:  ABILIFY           D5W 1,000 mL with nafcillin 12 g continuous infusion           famotidine 40 MG/5ML suspension   Commonly known as:  PEPCID   Replaced by:  famotidine 20 MG tablet           hydrochlorothiazide 25 MG tablet   Commonly known as:  HYDRODIURIL           labetalol 100 MG tablet   Commonly known as:  NORMODYNE           moxifloxacin 0.5 % ophthalmic solution   Commonly known as:  VIGAMOX           multivitamins with minerals Liqd liquid   Replaced by:  multivitamin, therapeutic with minerals Tabs tablet           ondansetron 4 MG tablet   Commonly known as:  ZOFRAN           polyethylene glycol Packet   Commonly known as:  MIRALAX/GLYCOLAX           potassium chloride 20 MEQ Packet   Commonly known as:   KLOR-CON   Replaced by:  potassium chloride 20 MEQ/15ML (10%) solution           QUEtiapine 25 MG tablet   Commonly known as:  SEROquel           venlafaxine 100 MG tablet   Commonly known as:  EFFEXOR                Where to get your medicines      Some of these will need a paper prescription and others can be bought over the counter. Ask your nurse if you have questions.     Bring a paper prescription for each of these medications     LORazepam 0.5 MG tablet       You don't need a prescription for these medications     acetaminophen 32 mg/mL solution    aspirin 81 MG chewable tablet    atorvastatin 40 MG tablet    bumetanide 1 MG tablet    Carboxymethylcellulose Sod PF 0.5 % Soln ophthalmic solution    carvedilol 6.25 MG tablet    famotidine 20 MG tablet    heparin sodium PF 5000 UNIT/0.5ML injection    losartan 25 MG tablet    mirtazapine 30 MG tablet    multivitamin, therapeutic with minerals Tabs tablet    nafcillin 2 GM vial    OLANZapine 2.5 MG tablet    ondansetron 4 MG ODT tab    potassium chloride 20 MEQ/15ML (10%) solution    rifampin 25 mg/mL Susp    senna-docusate 8.6-50 MG per tablet         Information about where to get these medications is not yet available     ! Ask your nurse or doctor about these medications     oxyCODONE IR 5 MG tablet               ANTIBIOTIC INSTRUCTION     You've Been Prescribed an Antibiotic - Now What?  Your healthcare team thinks that you or your loved one might have an infection. Some infections can be treated with antibiotics, which are powerful, life-saving drugs. Like all medications, antibiotics have side effects and should only be used when necessary. There are some important things you should know about your antibiotic treatment.      Your healthcare team may run tests before you start taking an antibiotic.    Your team may take samples (e.g., from your blood, urine or other areas) to run tests to look for bacteria. These test can be important to determine if you  need an antibiotic at all and, if you do, which antibiotic will work best.      Within a few days, your healthcare team might change or even stop your antibiotic.    Your team may start you on an antibiotic while they are working to find out what is making you sick.    Your team might change your antibiotic because test results show that a different antibiotic would be better to treat your infection.    In some cases, once your team has more information, they learn that you do not need an antibiotic at all. They may find out that you don't have an infection, or that the antibiotic you're taking won't work against your infection. For example, an infection caused by a virus can't be treated with antibiotics. Staying on an antibiotic when you don't need it is more likely to be harmful than helpful.      You may experience side effects from your antibiotic.    Like all medications, antibiotics have side effects. Some of these can be serious.    Let you healthcare team know if you have any known allergies when you are admitted to the hospital.    One significant side effect of nearly all antibiotics is the risk of severe and sometimes deadly diarrhea caused by Clostridium difficile (C. Difficile). This occurs when a person takes antibiotics because some good germs are destroyed. Antibiotic use allows C. diificile to take over, putting patients at high risk for this serious infection.    As a patient or caregiver, it is important to understand your or your loved one's antibiotic treatment. It is especially important for caregivers to speak up when patients can't speak for themselves. Here are some important questions to ask your healthcare team.    What infection is this antibiotic treating and how do you know I have that infection?    What side effects might occur from this antibiotic?    How long will I need to take this antibiotic?    Is it safe to take this antibiotic with other medications or supplements (e.g.,  vitamins) that I am taking?     Are there any special directions I need to know about taking this antibiotic? For example, should I take it with food?    How will I be monitored to know whether my infection is responding to the antibiotic?    What tests may help to make sure the right antibiotic is prescribed for me?      Information provided by:  www.cdc.gov/getsmart  [place] Department of Health and Human Services  Centers for disease Control and Prevention  National Center for Emerging and Zoonotic Infectious Diseases  Division of Healthcare Quality Promotion         Protect others around you: Learn how to safely use, store and throw away your medicines at www.disposemymeds.org.             Medication List: This is a list of all your medications and when to take them. Check marks below indicate your daily home schedule. Keep this list as a reference.      Medications           Morning Afternoon Evening Bedtime As Needed    acetaminophen 32 mg/mL solution   Commonly known as:  TYLENOL   20.3 mLs (650 mg) by Per NG tube route every 4 hours as needed for mild pain   Last time this was given:  650 mg on 1/1/2018  9:31 PM                                aspirin 81 MG chewable tablet   1 tablet (81 mg) by Oral or Feeding Tube route daily   Last time this was given:  81 mg on 1/4/2018  9:43 AM                                atorvastatin 40 MG tablet   Commonly known as:  LIPITOR   Take 1 tablet (40 mg) by mouth daily   Last time this was given:  40 mg on 1/4/2018  9:45 AM                                bumetanide 1 MG tablet   Commonly known as:  BUMEX   Take 1 tablet (1 mg) by mouth 2 times daily   Last time this was given:  1 mg on 1/4/2018  9:46 AM                                Carboxymethylcellulose Sod PF 0.5 % Soln ophthalmic solution   Commonly known as:  REFRESH PLUS   Place 1 drop Into the left eye 3 times daily as needed (to flush debris from eye)   Last time this was given:  1 drop on 12/21/2017  8:48 AM                                 carvedilol 6.25 MG tablet   Commonly known as:  COREG   Take 1 tablet (6.25 mg) by mouth 2 times daily (with meals)   Last time this was given:  6.25 mg on 1/4/2018  9:46 AM                                famotidine 20 MG tablet   Commonly known as:  PEPCID   Take 1 tablet (20 mg) by mouth 2 times daily   Last time this was given:  20 mg on 1/4/2018  9:45 AM                                fiber modular packet   1 packet by Per Feeding Tube route 3 times daily                                heparin sodium PF 5000 UNIT/0.5ML injection   Inject 0.5 mLs (5,000 Units) Subcutaneous every 8 hours   Last time this was given:  5,000 Units on 1/4/2018  4:40 PM                                ketotifen 0.025 % Soln ophthalmic solution   Commonly known as:  ZADITOR/REFRESH ANTI-ITCH   Place 1 drop Into the left eye 3 times daily   Last time this was given:  1 drop on 1/4/2018  2:34 PM                                lactobacillus Tabs   Take 1 tablet by mouth 3 times daily                                loperamide 2 MG tablet   Commonly known as:  IMODIUM A-D   2 tab by g tube 3 times daily PRN for diarrhea                                LORazepam 0.5 MG tablet   Commonly known as:  ATIVAN   Take 1 tablet (0.5 mg) by mouth 2 times daily as needed for anxiety   Last time this was given:  0.5 mg on 12/31/2017 10:46 PM                                losartan 25 MG tablet   Commonly known as:  COZAAR   Take 1 tablet (25 mg) by mouth every 12 hours   Last time this was given:  25 mg on 1/4/2018  9:44 AM                                Menthol-Zinc Oxide 0.44-20.6 % Oint   1 application topically TID PRN for perianal irritation                                Miconazole Nitrate 2 % Powd   Apply in folds                                mirtazapine 30 MG tablet   Commonly known as:  REMERON   Take 1 tablet (30 mg) by mouth At Bedtime   Last time this was given:  30 mg on 1/3/2018 10:40 PM                                 multivitamin, therapeutic with minerals Tabs tablet   Take 1 tablet by mouth daily   Start taking on:  1/5/2018                                nafcillin 2 GM vial   Inject 2 g into the vein every 4 hours for 3 days   Last time this was given:  2 g on 1/4/2018  4:33 PM                                OLANZapine 2.5 MG tablet   Commonly known as:  zyPREXA   Take 1 tablet (2.5 mg) by mouth 2 times daily   Last time this was given:  2.5 mg on 1/4/2018  9:47 AM                                ondansetron 4 MG ODT tab   Commonly known as:  ZOFRAN-ODT   Take 1 tablet (4 mg) by mouth every 6 hours as needed for nausea or vomiting   Last time this was given:  4 mg on 12/22/2017  6:13 PM                                oxyCODONE IR 5 MG tablet   Commonly known as:  ROXICODONE   Take 1 tablet (5 mg) by mouth every 4 hours as needed for moderate to severe pain   Last time this was given:  5 mg on 12/27/2017  1:38 AM                                potassium chloride 20 MEQ/15ML (10%) solution   Commonly known as:  KAYCIEL   Take 15 mLs (20 mEq) by mouth 2 times daily   Last time this was given:  20 mEq on 1/4/2018  9:45 AM                                rifampin 25 mg/mL Susp   Commonly known as:  REIFADEN   12 mLs (300 mg) by Oral or Feeding Tube route 2 times daily for 3 days   Last time this was given:  300 mg on 1/4/2018  9:43 AM                                senna-docusate 8.6-50 MG per tablet   Commonly known as:  SENOKOT-S;PERICOLACE   Take 1-2 tablets by mouth 2 times daily as needed for constipation   Last time this was given:  1 tablet on 12/29/2017 10:13 AM                                SYSTANE NIGHTTIME Oint   Last time this was given:  1/3/2018  9:02 PM

## 2017-12-16 NOTE — IP AVS SNAPSHOT
[address] [address]    Phone:  [phone]                                       After Visit Summary   12/16/2017    Cricket Miguel    MRN: [id number]           After Visit Summary Signature Page     I have received my discharge instructions, and my questions have been answered. I have discussed any challenges I see with this plan with the nurse or doctor.    ..........................................................................................................................................  Patient/Patient Representative Signature      ..........................................................................................................................................  Patient Representative Print Name and Relationship to Patient    ..................................................               ................................................  Date                                            Time    ..........................................................................................................................................  Reviewed by Signature/Title    ...................................................              ..............................................  Date                                                            Time

## 2017-12-16 NOTE — IP AVS SNAPSHOT
` `     [address] [facility] BANK: [phone]            Medication Administration Report for Cricket Miguel as of 01/04/18 1437   Legend:    Given Hold Not Given Due Canceled Entry Other Actions    Time Time (Time) Time  Time-Action       Inactive    Active    Linked        Medications 12/29/17 12/30/17 12/31/17 01/01/18 01/02/18 01/03/18 01/04/18    acetaminophen (TYLENOL) solution 650 mg  Dose: 650 mg Freq: EVERY 4 HOURS PRN Route: PER NG TUBE  PRN Reason: mild pain  Start: 12/19/17 1744   Admin Instructions: Maximum acetaminophen dose from all sources= 75 mg/kg/day not to exceed 4 grams/day.        2131 (650 mg)-Given              acetaminophen (TYLENOL) tablet 650 mg  Dose: 650 mg Freq: EVERY 4 HOURS PRN Route: PO  PRN Reason: other  PRN Comment: surgical pain  Start: 12/22/17 0000   Admin Instructions: May give first dose 4 hours after last scheduled dose of acetaminophen  Maximum acetaminophen dose from all sources = 75 mg/kg/day not to exceed 4 grams/day.               albuterol neb solution 2.5 mg  Dose: 2.5 mg Freq: 3 TIMES DAILY PRN Route: NEBULIZATION  PRN Reason: wheezing  Start: 12/31/17 1000       1704 (2.5 mg)-Given              artificial tears ophthalmic ointment  Freq: AT BEDTIME Route: OP  Start: 12/17/17 2200   Admin Instructions: Auto-sub for Systane      (0006)-Not Given       2234 ( )-Given        2254 ( )-Given        2126 ( )-Given        (2137)-Not Given        2102 ( )-Given        [ ] 2200           aspirin chewable tablet 81 mg  Dose: 81 mg Freq: DAILY Route: ORAL OR FEED  Start: 12/17/17 0800    0931 (81 mg)-Given        0950 (81 mg)-Given        0740 (81 mg)-Given        0837 (81 mg)-Given        0820 (81 mg)-Given        0824 (81 mg)-Given        0943 (81 mg)-Given           atorvastatin (LIPITOR) tablet 40 mg  Dose: 40 mg Freq: DAILY Route: PO  Start: 12/17/17 0800    0931 (40 mg)-Given        0950 (40 mg)-Given        0740 (40 mg)-Given        0836 (40 mg)-Given        0820 (40  mg)-Given        0824 (40 mg)-Given        0945 (40 mg)-Given           bumetanide (BUMEX) tablet 1 mg  Dose: 1 mg Freq: 2 TIMES DAILY. Route: PO  Start: 12/30/17 0800     0950 (1 mg)-Given       1605 (1 mg)-Given        0740 (1 mg)-Given       1609 (1 mg)-Given        0836 (1 mg)-Given       1535 (1 mg)-Given        0820 (1 mg)-Given       1614 (1 mg)-Given        0824 (1 mg)-Given       1633 (1 mg)-Given        0946 (1 mg)-Given       [ ] 1600           Carboxymethylcellulose Sod PF (REFRESH PLUS) 0.5 % ophthalmic solution 1 drop  Dose: 1 drop Freq: 3 TIMES DAILY PRN Route: LEFT EYE  PRN Comment: to flush debris from eye  Start: 12/20/17 1848              carvedilol (COREG) tablet 6.25 mg  Dose: 6.25 mg Freq: 2 TIMES DAILY WITH MEALS Route: PO  Start: 01/01/18 1800       1835 (6.25 mg)-Given        0820 (6.25 mg)-Given       1933 (6.25 mg)-Given        0824 (6.25 mg)-Given       1809 (6.25 mg)-Given        0946 (6.25 mg)-Given       [ ] 1800           Continuing beta blocker from home medication list OR beta blocker order already placed during this visit  Freq: DOES NOT GO TO MAR Route: XX  PRN Reason: other  Start: 12/16/17 4648   Admin Instructions: Continuing beta blocker from home medication list OR beta blocker order already placed during this visit               dextrose 10 % 1,000 mL infusion  Freq: CONTINUOUS PRN Route: IV  PRN Comment: Hypoglycemia prevention  Start: 12/20/17 1052   Admin Instructions: For Hypoglycemia Prevention for patients on long-acting subcutaneous basal insulin (Glargine, Detemir, NPH) or continuous insulin infusion. Whenever nutrition support is held or interrupted:   1) Infuse IV D10W at nutrition support rate  2) Notify provider for further instructions               dextrose 10 % 1,000 mL infusion  Freq: CONTINUOUS PRN Route: IV  PRN Comment: Give if on IV Insulin Infusion, and Parenteral or Enteral nutrition held or cycled off.   Start: 12/19/17 1658   Admin Instructions:  Infuse IV D10W at TPN/TF rate whenever nutrition is held or cycled off.               famotidine (PEPCID) tablet 20 mg  Dose: 20 mg Freq: 2 TIMES DAILY Route: PO  Start: 12/27/17 2000   Admin Instructions: Dose adjusted per renal dosing policy.  Estimated CrCl = >50 mL/min.     0958 (20 mg)-Given       2114 (20 mg)-Given        0950 (20 mg)-Given       2013 (20 mg)-Given        0741 (20 mg)-Given       1935 (20 mg)-Given        0837 (20 mg)-Given       2133 (20 mg)-Given        0820 (20 mg)-Given       2012 (20 mg)-Given        0824 (20 mg)-Given       2048 (20 mg)-Given        0945 (20 mg)-Given       [ ] 2000           glucose 40 % gel 15-30 g  Dose: 15-30 g Freq: EVERY 15 MIN PRN Route: PO  PRN Reason: low blood sugar  Start: 12/19/17 1658   Admin Instructions: Give 15 g for BG 51 to 69 mg/dL IF patient is conscious and able to swallow. Give 30 g for BG less than or equal to 50 mg/dL IF patient is conscious and able to swallow. Do NOT give glucose gel via enteral tube.  IF patient has enteral tube: give apple juice 120 mL (4 oz or 15 g of CHO) via enteral tube for BG 51 to 69 mg/dL.  Give apple juice 240 mL (8 oz or 30 g of CHO) via enteral tube for BG less than or equal to 50 mg/dL.    ~Oral gel is preferable for conscious and able to swallow patient.   ~IF gel unavailable or patient refuses may provide apple juice 120 mL (4 oz or 15 g of CHO). Document juice on I and O flowsheet.              Or  dextrose 50 % injection 25-50 mL  Dose: 25-50 mL Freq: EVERY 15 MIN PRN Route: IV  PRN Reason: low blood sugar  Start: 12/19/17 1658   Admin Instructions: Use if have IV access, BG less than 70 mg/dL and meet dose criteria below:  Dose if conscious and alert (or disorientated) and NPO = 25 mL  Dose if unconscious / not alert = 50 mL  Vesicant. For ordered doses up to 25 mg, give IV Push undiluted. Give each 5g over 1 minute.              Or  glucagon injection 1 mg  Dose: 1 mg Freq: EVERY 15 MIN PRN Route: SC  PRN  Reason: low blood sugar  PRN Comment: May repeat x 1 only  Start: 12/19/17 1658   Admin Instructions: May give SQ or IM. ONLY use glucagon IF patient has NO IV access AND is UNABLE to swallow AND blood glucose is LESS than or EQUAL to 50 mg/dL.  Give IV Push over 1 minute. Reconstitute with 1mL sterile water.               heparin sodium PF injection 5,000 Units  Dose: 5,000 Units Freq: EVERY 8 HOURS Route: SC  Start: 12/20/17 0945   Admin Instructions: High concentration HEParin. Not for line flush or cath care.     0349 (5,000 Units)-Given       0959 (5,000 Units)-Given       2113 (5,000 Units)-Given        0433 (5,000 Units)-Given       1235 (5,000 Units)-Given       2013 (5,000 Units)-Given        0351 (5,000 Units)-Given       1151 (5,000 Units)-Given       2011 (5,000 Units)-Given        0522 (5,000 Units)-Given       1122 (5,000 Units)-Given       2132 (5,000 Units)-Given        (0353)-Not Given       1326 (5,000 Units)-Given       2021 (5,000 Units)-Given        0402 (5,000 Units)-Given       1331 (5,000 Units)-Given       2048 (5,000 Units)-Given        (0308)-Not Given       [ ] 1200       [ ] 2000           hydrALAZINE (APRESOLINE) injection 10 mg  Dose: 10 mg Freq: EVERY 30 MIN PRN Route: IV  PRN Reason: high blood pressure  PRN Comment: Systolic Blood Pressure greater than 140 mmHg or Diastolic Blood Pressure greater than 90 mmHg  Start: 12/19/17 1744   Admin Instructions: For ordered doses up to 40 mg, give IV Push undiluted over 1 minute.               HYDROmorphone (PF) (DILAUDID) injection 0.2 mg  Dose: 0.2 mg Freq: EVERY 1 HOUR PRN Route: IV  PRN Reason: other  PRN Comment: pain control or improvement in physical function  Start: 12/22/17 1834   Admin Instructions: Hold dose for analgesic side effects. Notify providers to assess for uncontrolled pain or analgesic side effects. Hold while on IV PCA  For ordered doses up to 4 mg give IV Push undiluted. Administer each 2mg over 2-5 minutes.             "   ketotifen (ZADITOR/REFRESH ANTI-ITCH) 0.025 % ophthalmic solution 1 drop  Dose: 1 drop Freq: 3 TIMES DAILY Route: LEFT EYE  Start: 12/17/17 2000    0930 (1 drop)-Given       (1744)-Not Given       2122 (1 drop)-Given        0951 (1 drop)-Given       1308 (1 drop)-Given       2012 (1 drop)-Given        0751 (1 drop)-Given       1341 (1 drop)-Given       2011 (1 drop)-Given        0841 (1 drop)-Given       1536 (1 drop)-Given       (2126)-Not Given        0822 (1 drop)-Given       1331 (1 drop)-Given       (2013)-Not Given        0839 (1 drop)-Given       1500 (1 drop)-Given [C]       2049 (1 drop)-Given        0946 (1 drop)-Given       [ ] 1400       [ ] 2000           lidocaine (LMX4) kit  Freq: EVERY 1 HOUR PRN Route: Top  PRN Reason: pain  PRN Comment: with VAD insertion or accessing implanted port.  Start: 12/19/17 1744   Admin Instructions: Do NOT give if patient has a history of allergy to any local anesthetic or any \"jose\" product.   Apply 30 minutes prior to VAD insertion or port access.  MAX Dose:  2.5 g (  of 5 g tube)               lidocaine 1 % 1 mL  Dose: 1 mL Freq: EVERY 1 HOUR PRN Route: OTHER  PRN Comment: mild pain with VAD insertion or accessing implanted port  Start: 12/19/17 1744   Admin Instructions: Do NOT give if patient has a history of allergy to any local anesthetic or any \"jose\" product. MAX dose 1 mL subcutaneous OR intradermal in divided doses.               LORazepam (ATIVAN) tablet 0.5 mg  Dose: 0.5 mg Freq: 2 TIMES DAILY PRN Route: PO  PRN Reason: anxiety  Start: 12/28/17 0934     1903 (0.5 mg)-Given        2246 (0.5 mg)-Given               losartan (COZAAR) tablet 25 mg  Dose: 25 mg Freq: EVERY 12 HOURS SCHEDULED Route: PO  Start: 12/26/17 2000   Admin Instructions: Aware of lisinopril intolerance     0930 (25 mg)-Given       2114 (25 mg)-Given        0950 (25 mg)-Given       2013 (25 mg)-Given        0740 (25 mg)-Given       1935 (25 mg)-Given        0836 (25 mg)-Given     "   2133 (25 mg)-Given        0820 (25 mg)-Given       2012 (25 mg)-Given        0824 (25 mg)-Given       2048 (25 mg)-Given        0944 (25 mg)-Given       [ ] 2000           magnesium sulfate 2 g in NS intermittent infusion (PharMEDium or FV Cmpd)  Dose: 2 g Freq: DAILY PRN Route: IV  PRN Reason: magnesium supplementation  Last Dose: 2 g (01/02/18 1100)  Start: 12/19/17 1744   Admin Instructions: For Serum Mg++ 1.6 - 2 mg/dL  Give 2 g and recheck magnesium level next AM.         1100 (2 g)-New Bag [C]             magnesium sulfate 4 g in 100 mL sterile water (premade)  Dose: 4 g Freq: EVERY 4 HOURS PRN Route: IV  PRN Reason: magnesium supplementation  Start: 12/19/17 1744   Admin Instructions: For serum Mg++ less than 1.6 mg/dL  Give 4 g and recheck magnesium level 2 hours after dose, and next AM.               mirtazapine (REMERON) tablet 30 mg  Dose: 30 mg Freq: AT BEDTIME Route: PO  Start: 12/18/17 2200    2134 (30 mg)-Given        2234 (30 mg)-Given        2242 (30 mg)-Given        2133 (30 mg)-Given        2133 (30 mg)-Given        2240 (30 mg)-Given        [ ] 2200           nafcillin IV 2 g vial to attach to  ml bag  Dose: 2 g Freq: EVERY 4 HOURS Route: IV  Indications of Use: ENDOCARDITIS  Start: 12/26/17 0830   Admin Instructions: Vesicant.     0012 (2 g)-Given       0511 (2 g)-Given       0924 (2 g)-Given       1314 (2 g)-Given       1658 (2 g)-Given       2122 (2 g)-Given        0006 (2 g)-Given       0433 (2 g)-Given       0951 (2 g)-Given       1235 (2 g)-Given       1606 (2 g)-Given       2012 (2 g)-Given        0038 (2 g)-Given       0400 (2 g)-Given       0730 (2 g)-Given       1201 (2 g)-Given       1609 (2 g)-Given       2011 (2 g)-Given        0147 (2 g)-Given       0522 (2 g)-Given       0827 (2 g)-Given       1155 (2 g)-Given       1542 (2 g)-Given       2124 (2 g)-Given               0109 (2 g)-Given       0430 (2 g)-Given       0819 (2 g)-Given       1325 (2 g)-Given       1614 (2  g)-Given       2010 (2 g)-Given       2358 (2 g)-Given        0400 (2 g)-Given       0817 (2 g)-Given       1127 (2 g)-Given       1635 (2 g)-Given       2048 (2 g)-Given        0003 (2 g)-Given       0412 (2 g)-Given       0946 (2 g)-Given       [ ] 1200       [ ] 1600       [ ] 2000           naloxone (NARCAN) injection 0.1-0.4 mg  Dose: 0.1-0.4 mg Freq: EVERY 2 MIN PRN Route: IV  PRN Reason: opioid reversal  Start: 12/19/17 1744   Admin Instructions: For respiratory rate LESS than or EQUAL to 8.  Partial reversal dose:  0.1 mg titrated q 2 minutes for Analgesia Side Effects Monitoring Sedation Level of 3 (frequently drowsy, arousable, drifts to sleep during conversation).Full reversal dose:  0.4 mg bolus for Analgesia Side Effects Monitoring Sedation Level of 4 (somnolent, minimal or no response to stimulation).  For ordered doses up to 2mg give IVP. Give each 0.4mg over 15 seconds in emergency situations. For non-emergent situations further dilute in 9mL of NS to facilitate titration of response.               OLANZapine (zyPREXA) tablet 2.5 mg  Dose: 2.5 mg Freq: 2 TIMES DAILY Route: PO  Start: 01/01/18 2000   Admin Instructions: Combined IM and PO doses may significantly increase the risk of orthostatic hypotension at 30 mg per day or higher.        2132 (2.5 mg)-Given        0821 (2.5 mg)-Given       2012 (2.5 mg)-Given        0824 (2.5 mg)-Given       2048 (2.5 mg)-Given        0947 (2.5 mg)-Given       [ ] 2000           ondansetron (ZOFRAN-ODT) ODT tab 4 mg  Dose: 4 mg Freq: EVERY 6 HOURS PRN Route: PO  PRN Reasons: nausea,vomiting  Start: 12/19/17 1744   Admin Instructions: This is Step 1 of nausea and vomiting management.  If nausea not resolved in 15 minutes, go to Step 2 prochlorperazine (COMPAZINE). Do not push through foil backing. Peel back foil and gently remove. Place on tongue immediately. Administration with liquid unnecessary              Or  ondansetron (ZOFRAN) injection 4 mg  Dose: 4 mg  Freq: EVERY 6 HOURS PRN Route: IV  PRN Reasons: nausea,vomiting  Start: 12/19/17 1744   Admin Instructions: This is Step 1 of nausea and vomiting management.  If nausea not resolved in 15 minutes, go to Step 2 prochlorperazine (COMPAZINE).  Irritant. For ordered doses up to 4 mg, give IV Push undiluted over 2-5 minutes.               oxyCODONE IR (ROXICODONE) tablet 5 mg  Dose: 5 mg Freq: EVERY 4 HOURS PRN Route: PO  PRN Reason: moderate to severe pain  Start: 12/21/17 1000   Admin Instructions: IF CrCl is UNKNOWN start at lowest end of dosing range. Hold while on PCA or with regular IV opioid dosing.               polyethylene glycol (MIRALAX/GLYCOLAX) Packet 17 g  Dose: 17 g Freq: DAILY Route: PO  Start: 12/21/17 1000   Admin Instructions: 1 Packet = 17 grams. Mixed prescribed dose in 8 ounces of water. Follow with 8 oz. of water.     1014 (17 g)-Given        0951 (17 g)-Given        (1024)-Not Given        (0854)-Not Given        (0818)-Not Given        (0838)-Not Given        (0947)-Not Given           potassium chloride (KAYCIEL) solution 20 mEq  Dose: 20 mEq Freq: 2 TIMES DAILY Route: PO  Start: 12/30/17 0800     0951 (20 mEq)-Given       2013 (20 mEq)-Given        1151 (20 mEq)-Given       2010 (20 mEq)-Given        0836 (20 mEq)-Given       2131 (20 mEq)-Given        0820 (20 mEq)-Given       2012 (20 mEq)-Given        0830 (20 mEq)-Given       2048 (20 mEq)-Given        0945 (20 mEq)-Given       [ ] 2000           potassium chloride (KLOR-CON) Packet 20-40 mEq  Dose: 20-40 mEq Freq: EVERY 2 HOURS PRN Route: ORAL OR FEED  PRN Reason: potassium supplementation  Start: 12/19/17 1744   Admin Instructions: Use if unable to tolerate tablets.    If Serum K+ 3.4-4.0, dose = 20 mEq x1. Recheck K+ level the next AM.  If Serum K+ 3.0-3.3, dose = 60 mEq po total dose (40 mEq x 1 followed in 2 hours by 20 mEq X1). Recheck K+ level 4 hours after dose and the next AM.  If Serum K+ 2.5-2.9, dose = 80 mEq po total dose (40  mEq Q2H x2). Recheck K+ level 4 hours after dose and the next AM.  If Serum K+ less than 2.5, See IV order.  Dissolve packet contents in 4-8 ounces of cold water or juice.     1657 (20 mEq)-Given         1151 (20 mEq)-Given        1134 (20 mEq)-Given              potassium chloride 10 mEq in 100 mL intermittent infusion with 10 mg lidocaine  Dose: 10 mEq Freq: EVERY 1 HOUR PRN Route: IV  PRN Reason: potassium supplementation  Last Dose: 10 mEq (12/19/17 1836)  Start: 12/19/17 1744   Admin Instructions: Infuse via PERIPHERAL LINE. Use potassium with lidocaine for pain with peripheral administration.  If Serum K+ 3.4-4.0, dose = 10 mEq/hr x2 doses. Recheck K+ level the next AM.  If Serum K+ 3.0-3.3, dose = 10 mEq/hr x4 doses (40 mEq IV total dose). Recheck K+ level 2 hours after dose and the next AM.  If Serum K+ less than 3.0, dose = 10 mEq/hr x6 doses (60 mEq IV total dose). Recheck K+ level 2 hours after dose and the next AM.               potassium chloride 10 mEq in 100 mL sterile water intermittent infusion (premix)  Dose: 10 mEq Freq: EVERY 1 HOUR PRN Route: IV  PRN Reason: potassium supplementation  Start: 12/19/17 1744   Admin Instructions: Infuse via PERIPHERAL LINE or CENTRAL LINE. Use for central line replacement if patient weight less than 65 kg, if patient is on TPN with high potassium content or if unit does not stock 20 mEq bags.  If Serum K+ 3.4-4.0, dose = 10 mEq/hr x2 doses. Recheck K+ level the next AM.  If Serum K+ 3.0-3.3, dose = 10 mEq/hr x4 doses (40 mEq IV total dose). Recheck K+ level 2 hours after dose and the next AM.  If Serum K+ less than 3.0, dose = 10 mEq/hr x6 doses (60 mEq IV total dose). Recheck K+ level 2 hours after dose and the next AM.               potassium chloride 20 mEq in 50 mL intermittent infusion  Dose: 20 mEq Freq: EVERY 1 HOUR PRN Route: IV  PRN Reason: potassium supplementation  Start: 12/19/17 1744   Admin Instructions: Infuse via CENTRAL LINE Only.  May need EKG if  less than 65 kg or on TPN - Max rate is 0.3 mEq/kg/hr for patients not on EKG monitoring.    If Serum K+ 3.4-4.0, dose = 20 mEq/hr x1 doses. Recheck K+ level the next AM.  If Serum K+ 3.0-3.3, dose = 20 mEq/hr x2 doses (40 mEq IV total dose).  Recheck K+ level 2 hours after dose and the next AM.  If Serum K+ less than 3.0, dose = 20 mEq/hr x3 doses (60 mEq IV total dose). Recheck K+ level 2 hours after dose and the next AM.               potassium chloride SA (K-DUR/KLOR-CON M) CR tablet 20-40 mEq  Dose: 20-40 mEq Freq: EVERY 2 HOURS PRN Route: PO  PRN Reason: potassium supplementation  Start: 12/19/17 1744   Admin Instructions: Use if able to take PO.   If Serum K+ 3.4-4.0, dose = 20 mEq x1. Recheck K+ level the next AM.  If Serum K+ 3.0-3.3, dose = 60 mEq po total dose (40 mEq x1 followed in 2 hours by 20 mEq x1). Recheck K+ level 4 hours after dose and the next AM.  If Serum K+ 2.5-2.9, dose = 80 mEq po total dose (40 mEq Q2H x2). Recheck K+ level 4 hours after dose and the next AM.  If Serum K+ less than 2.5, See IV order.  DO NOT CRUSH               potassium phosphate 10 mmol in D5W 250 mL intermittent infusion  Dose: 10 mmol Freq: DAILY PRN Route: IV  PRN Reason: phosphorous supplementation  Start: 12/19/17 1744   Admin Instructions: For serum phosphorus level 2.5-2.7  Do not infuse Phosphorus in the same line as TPN.   Give 10 mmol and recheck phosphorus level the next AM.               potassium phosphate 15 mmol in D5W 250 mL intermittent infusion  Dose: 15 mmol Freq: DAILY PRN Route: IV  PRN Reason: phosphorous supplementation  Start: 12/19/17 1744   Admin Instructions: For serum phosphorus level 2.0-2.4  Do not infuse Phosphorus in the same line as TPN.   Give 15 mmol and recheck phosphorus level next AM.               potassium phosphate 20 mmol in D5W 250 mL intermittent infusion  Dose: 20 mmol Freq: EVERY 6 HOURS PRN Route: IV  PRN Reason: phosphorous supplementation  Last Dose: 20 mmol (12/25/17  2146)  Start: 12/19/17 1744   Admin Instructions: For serum phosphorus level 1.1-1.9  For CENTRAL Line ONLY  Do not infuse Phosphorus in the same line as TPN.   Give 20 mmol and recheck phosphorus level 2 hours after dose and next AM.               potassium phosphate 20 mmol in D5W 500 mL intermittent infusion  Dose: 20 mmol Freq: EVERY 6 HOURS PRN Route: IV  PRN Reason: phosphorous supplementation  Start: 12/19/17 1744   Admin Instructions: For serum phosphorus level 1.1-1.9  For peripheral line  Do not infuse Phosphorus in the same line as TPN.   Give 20 mmol and recheck phosphorus level 2 hours after dose and next AM. Repeat if necessary.               potassium phosphate 25 mmol in D5W 500 mL intermittent infusion  Dose: 25 mmol Freq: EVERY 8 HOURS PRN Route: IV  PRN Reason: phosphorous supplementation  Start: 12/19/17 1744   Admin Instructions: For serum phosphorus level less than 1.1  Do not infuse Phosphorus in the same line as TPN.   Give 25 mmol and recheck phosphorus level 2 hours after dose and next AM.               protein modular (PROSource TF) 1 packet  Dose: 1 packet Freq: 3 TIMES DAILY Route: PER FEEDING   Start: 12/27/17 1400   Admin Instructions: Infuse via syringe down feeding tube. Flush feeding tube with 15-30 mL of water after administration. Do not mix with other medications.  No mixing or dilution is required. SUPPLIED BY NUTRITION DEPARTMENT.     0943 (1 packet)-Given       1657 (1 packet)-Given       2120 (1 packet)-Given        0953 (1 packet)-Given       1308 (1 packet)-Given       2013 (1 packet)-Given        0743 (1 packet)-Given       1341 (1 packet)-Given       2010 (1 packet)-Given        0837 (1 packet)-Given       1536 (1 packet)-Given       (2147)-Not Given        0821 (1 packet)-Given       1328 (1 packet)-Given       2013 (1 packet)-Given        0856 (1 packet)-Given       1633 (1 packet)-Given       2049 (1 packet)-Given        0947 (1 packet)-Given       [ ] 1400       [ ]  2000           Reason beta blocker order not selected  Freq: DOES NOT GO TO MAR Route: XX  PRN Reason: other  Start: 12/19/17 1744              rifampin (REIFADEN) suspension 300 mg  Dose: 300 mg Freq: 2 TIMES DAILY Route: ORAL OR FEED  Indications of Use: ABSCESS  Start: 12/17/17 0800   End: 01/07/18 4469   Admin Instructions: Shake well.  Recommended to take on an empty stomach.     0929 (300 mg)-Given       2121 (300 mg)-Given        1019 (300 mg)-Given       2011 (300 mg)-Given        0741 (300 mg)-Given              2018 (300 mg)-Given        0836 (300 mg)-Given       2130 (300 mg)-Given        0821 (300 mg)-Given       2012 (300 mg)-Given        0819 (300 mg)-Given       2048 (300 mg)-Given        0943 (300 mg)-Given       [ ] 2000           senna-docusate (SENOKOT-S;PERICOLACE) 8.6-50 MG per tablet 1-2 tablet  Dose: 1-2 tablet Freq: 2 TIMES DAILY Route: PO  Start: 12/19/17 2000   Admin Instructions: Start with 1 tablet PO BID, If no bowel movement in 24 hours, increase to 2 tablets PO BID.  Hold for loose stools.     1013 (1 tablet)-Given       (2114)-Not Given        0800-Hold [C]       (2000)-Not Given        (0743)-Not Given       (1923)-Not Given        (0854)-Not Given       (2132)-Not Given        (0812)-Not Given       (2012)-Not Given        (0835)-Not Given       (1905)-Not Given        (0947)-Not Given       [ ] 2000           sodium chloride (PF) 0.9% PF flush 3 mL  Dose: 3 mL Freq: EVERY 8 HOURS Route: IK  Start: 12/19/17 1745   Admin Instructions: And Q1H PRN, to lock peripheral IV dormant line.     0352 (3 mL)-Given       0944 (3 mL)-Given       1658 (3 mL)-Given        0006 (3 mL)-Given       0954 (3 mL)-Given       1607 (3 mL)-Given        (0027)-Not Given       (1024)-Not Given       1609 (3 mL)-Given        (0148)-Not Given       (0854)-Not Given       (1641)-Not Given        (0109)-Not Given       0822 (3 mL)-Given       1620 (3 mL)-Given       2359 (3 mL)-Given        0836 (3 mL)-Given        (1634)-Not Given        0003 (3 mL)-Given       0948 (3 mL)-Given       [ ] 1600           sodium chloride (PF) 0.9% PF flush 3 mL  Dose: 3 mL Freq: EVERY 1 HOUR PRN Route: IK  PRN Reason: line flush  PRN Comment: for peripheral IV flush post IV meds  Start: 12/19/17 1744    0727 (20 mL)-Given         0701 (20 mL)-Given        0814 (20 mL)-Given        0656 (20 mL)-Given             sodium chloride 3 % neb solution 3 mL  Dose: 3 mL Freq: 3 TIMES DAILY PRN Route: NEBULIZATION  PRN Comment: mucolysis/respiratory distress  Start: 01/01/18 1627              temazepam (RESTORIL) capsule 15 mg  Dose: 15 mg Freq: AT BEDTIME PRN Route: PO  PRN Reason: sleep  Start: 12/20/17 2000   Admin Instructions: POD 1.  May repeat x 1 if initial dose was 7.5 mg. Do not repeat 15 mg dose.   Do not give unless at least 6 hours of uninterrupted sleep is expected.               White Petrolatum GEL  Freq: 3 TIMES DAILY Route: Top  Start: 01/01/18 1400   Admin Instructions: Apply to upper and lower lips to avoid dryness        (1539)-Not Given       (2147)-Not Given        0822 ( )-Given       1327 ( )-Given       2013 ( )-Given        (1133)-Not Given       (1400)-Not Given       2049 ( )-Given        0948 ( )-Given       [ ] 1400       [ ] 2000          Future Medications  Medications 12/29/17 12/30/17 12/31/17 01/01/18 01/02/18 01/03/18 01/04/18       multivitamin, therapeutic with minerals (THERA-VIT-M) tablet 1 tablet  Dose: 1 tablet Freq: DAILY Route: PO  Start: 01/05/18 0800             Completed Medications  Medications 12/29/17 12/30/17 12/31/17 01/01/18 01/02/18 01/03/18 01/04/18         Dose: 2 g Freq: EVERY 8 HOURS Route: IV  Indications of Use: HEALTHCARE-ASSOCIATED PNEUMONIA  Last Dose: 2 g (01/02/18 1144)  Start: 12/26/17 1200   End: 01/03/18 1157    0346 (2 g)-New Bag       1314 (2 g)-New Bag       2121 (2 g)-New Bag        0432 (2 g)-New Bag       1234 (2 g)-New Bag       2012 (2 g)-New Bag        0351 (2 g)-New Bag        1201 (2 g)-New Bag       1935 (2 g)-New Bag        0521 (2 g)-New Bag       1235 (2 g)-New Bag       2122 (2 g)-New Bag        0422 (2 g)-New Bag       1144 (2 g)-New Bag       2008 (2 g)-New Bag        0400 (2 g)-New Bag       1127 (2 g)-New Bag           Discontinued Medications  Medications 12/29/17 12/30/17 12/31/17 01/01/18 01/02/18 01/03/18 01/04/18         Dose: 4 mL Freq: 3 TIMES DAILY PRN Route: NEBULIZATION  PRN Reason: mucolysis/respiratory distress  Start: 12/31/17 1000   End: 01/01/18 1623   Admin Instructions: 4 mL of 10% solution substitutes for 2 mL of 20 % due to drug shortage<br>  Nebulization tubing with a FILTER is recommended with acetylcysteine nebs.        1623-Med Discontinued            Dose: 300 mg Freq: DAILY Route: PO/GT  Indications of Use: CANDIDIASIS  Start: 12/30/17 0945   End: 01/03/18 1116   Admin Instructions: Shake well.      1234 (300 mg)-Given        0740 (300 mg)-Given        0836 (300 mg)-Given        0821 (300 mg)-Given        0831 (300 mg)-Given       1116-Med Discontinued          Dose: 15 mL Freq: DAILY Route: PER FEEDING   Start: 12/20/17 1100   End: 01/04/18 1120    0929 (15 mL)-Given        0950 (15 mL)-Given        0740 (15 mL)-Given        0835 (15 mL)-Given        0821 (15 mL)-Given        0822 (15 mL)-Given        0942 (15 mL)-Given       1120-Med Discontinued

## 2017-12-16 NOTE — IP AVS SNAPSHOT
` `     UNIT [facility]C [facility]: [phone]                 INTERAGENCY TRANSFER FORM - NOTES (H&P, Discharge Summary, Consults, Procedures, Therapies)   2017                    Hospital Admission Date: 2017  CRICKET MIGUEL   : 1955  Sex: Male        Patient PCP Information     Provider PCP Type    Dipak Gordillo MD General         History & Physicals      H&P by Christian Rodriguez MD at 2017  5:17 PM     Author:  Christian Rodriguez MD Service:  Cardiac ICU Author Type:  Resident    Filed:  2017  8:37 PM Date of Service:  2017  5:17 PM Creation Time:  2017  5:03 PM    Status:  Attested :  Christian Rodriguez MD (Resident)    Cosigner:  Tera Alford MD at 2017 11:32 AM        Attestation signed by Tera Alford MD at 2017 11:32 AM        Cricket Miguel is a 62 year old male who was admitted on 2017  I personally examined and evaluated the patient today. The care plan was developed with the house staff team or resident(s) and I agree with the findings and plan in this note aside from any exceptions noted below.  I have reviewed and evaluated the vital signs, medications, laboratory values, imaging studies, and consults from the past 24 hours.  Active problems and key treatments for today include:  mechanical ventilation s/p homograft placement. Will wean tomorrow  Post op bleeding. Received factor 7 in the OR. Will monitor chest tube outputs  Cardiogenic shock on multiple pressors. Will wean as tolerated  Usual Cares:  DVT Prophylaxis: Defer to primary service  GI Prophylaxis: PPI  Restraints: Restraints for medical healing needed: YES  Access/lines/tubes:  Diet: NPO  Code status: full  Dispo:CVICU    Family update by me today: No    I spent a total of 40 minutes providing critical care services exclusive of procedures.    Tera Alford                                   CVICU ADMISSION NOTE  2017      ASSESSMENT: Cricket Miguel  is a 62 year old male with hx of COPD, HTN, HLD, CAD, HFpEF, aortic stenosis and ascending aortic aneurism s/p aortic valve replacement and aortic root replacement (April 2016) c/b moises-aortic abscess, endocarditis, cerebral septic emboli and severe AI s/p redo-sternotomy aortic root and valve homograft 12/19.       PLAN:   Neuro/ pain/ sedation: Hx of multifocal acute infarctions involving the left parietotemporal lobes, left cerebral hemisphere and right occipital lobe presumed to be septic emboli. Depression, mild delirium, followed by psych.  -Monitor neurological status. Notify the MD for any acute changes in exam.  -fentanyl[DP1.1] ggt[AS1.1] for pain.  -precedex[DP1.1]/propofol[AS1.1] for sedation.  When able, restart psychia-tric medications per psych recommendations:   1.  He may discontinue the Abilify.   2.  Drop the Effexor to 50 mg twice a day for 3 days and then discontinue.   3.  Increase Remeron to 30 mg at bedtime.   4.  It is okay to continue the Ativan 0.5- 1.0 mg q.6 h. IV p.r.n. anxiety.   5.  Seroquel 25 mg q.i.d. p.r.n. anxiety and agitation.   6.  Haldol 1-2 mg IV q.4h. for severe agitation.   7.  Reconsult Psychiatry as needed.      Pulmonary care: Intubated and mechanically ventilated.   -[DP1.1]Wean to extubate in AM[AS1.1]     Cardiovascular:  HTN, HLD, HFpEF, CAD, AS and aortic aneurism s/p bental c/b endocarditis and severe AI s/p aortic root/valve homograft 12/19.  -[DP1.1]Epinephrine to maintain MAP>65  -Keep SBP<120[AS1.1]  -holding pta ASA 81mg, HCTZ, labetalol, and atorvastatin     GI care: OGT  -OGT to LIS  -p[DP1.1]antoprazole[AS1.1]     Fluids/ Electrolytes/ Nutrition:   -[DP1.1]PRN electrolyte replacement[AS1.1]  -No indication for parenteral nutrition.     Renal/ Fluid Balance: Urine output is adequate so far.  -Espinoza catheter for strict intake and output.     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 Hx of  MSSA septic arthritis and bacteremia. Hx of multifocal acute infarctions involving the left parietotemporal lobes, left cerebral hemisphere and right occipital lobe presumed to be septic emboli. ID following.   -per ID, continue nafcillin, rifampin  -Repeat blood cultures if febrile  -F/u surgical tissue cultures   Heme: acute blood loss anemia  -[DP1.1]Given 5 U FFP, 4 U PRBCs, 2 U Plt, 2 U cryo, 550 cell saver intraop[AS1.1]  -[DP1.1]Trend CBC[AS1.1]     Prophylaxis:    -Mechanical prophylaxis for DVT.   -No chemical DVT prophylaxis due to high risk of bleeding.      Lines/ tubes/ drains:  -[DP1.1]R IJI[AS1.1] CVC,[DP1.1] L radial[AS1.1] arterial line,[DP1.1] R triple lumen PICC,[AS1.1] GILDARDO garza, PIVs     Disposition:  -CVICU.     Patient seen, findings and plan discussed with CVICU staff Rocío.[DP1.1]    Christian Rodriguez MD  PGY-3, General Surgery[AS1.1]    - - - - - - - - - - - - - - - - - - - - - - - - - - - - - - - - - - - - - - - - - - - - - - - - - - - - - - - - - - - - - - - - - - - - - - - -     PRIMARY TEAM: STEFFANY  PRIMARY PHYSICIAN: Jero    REASON FOR CRITICAL CARE ADMISSION: s/p aortic root valve homograft   ADMITTING PHYSICIAN: Jero    HISTORY PRESENTING ILLNESS: Cricket Miguel is a 62 year old male with hx of COPD, HTN, HLD, CAD, HFpEF, aortic stenosis and ascending aortic aneurism s/p aortic valve replacement and aortic root replacement (April 2016) c/b moises-aortic abscess, endocarditis, cerebral septic emboli and severe AI s/p redo-sternotomy aortic root and valve homograft 12/19.     Echodate:12/17/17results:The patient is post bio-Bentall - aortic root replacement with a 30 mm graft and AVR with a Trifecta valve. Today, there is circumferential free space noted surrounding the aortic graft, dehiscence and rocking of the graft from  the native aorta and severe AI in the space surrounding the aortic graft (perivalvular/perigraft). The circumferential free space was present on the previous studies of  October 2017 but was filled with echodense material and  the graft was not independently mobile, and there was no significant AI. The prosthetic aortic valve leaflets are not well visualized    ECG reviewed date:10/7/2017 results: EKG: Sinus rhythm at 85, no ST/T changes, no ectopy. QTc normal.     Cath date: 5/2016 results:Angiographic Results:  Right coronary artery is a small nondominant vessel.  Left main coronary artery has mild ostial tapering in the 10-20%  range.   Left anterior descending artery is a large wraparound vessel. There is  narrowing in the mid vessel that appears to be in the 40-50% range.  There is a large ramus intermedius branch with minimal luminal  irregularities.  Circumflex coronary artery has a 30% narrowing prior to the first  obtuse marginal the first obtuse marginal has a 90-95% stenosis. There  are several branch vessels in the posterior aspect of the heart that  make up the posterior descending artery, they have mild to moderate  disease.  Ventriculography demonstrates ejection fraction of 55-60%.    REVIEW OF SYSTEMS: Unable to obtain secondary to patient intubated and sedated.     PAST MEDICAL HISTORY:   Past Medical History:   Diagnosis Date     Abscess of aortic root 09/2017     AI (aortic insufficiency)     severe     Anxiety      Aortic root dilatation (H)      AR (aortic regurgitation)     severe     Cardiomyopathy (H)      CHF (congestive heart failure), NYHA class II (H)      COPD, mild (H)     spirometry 12/16     CVA (cerebral vascular accident) (H) 09/2017    septic emboli - MSSA - cerebellum, Left MCA, Rt Occipital, Aphasia, Left visual field cut, moderate to severe encephalopathy     Depression 09/2017     Heart murmur      Hyperlipidemia LDL goal <70 10/31/2010     Hypertension goal BP (blood pressure) < 140/90      Inguinal hernia      left lung nodule  1/29/2008     Major depressive disorder, single episode, moderate (H)      Psoriasis childhood     Tobacco use  disorder        SURGICAL HISTORY:   Past Surgical History:   Procedure Laterality Date     ARTHROSCOPY KNEE INCISION AND DRAINAGE Left 10/8/2017    Procedure: ARTHROSCOPY KNEE INCISION AND DRAINAGE;  Arthroscopic Incision and Drainage of Left Knee;  Surgeon: Lucretia Munoz MD;  Location: [facility] OR     HC SHOULDER ARTHROSCOPY, DX  2001    Arthroscopy, Shoulder RT Dr OSIRIS Andersen     HC SHOULDER ARTHROSCOPY, DX  1/04    LT arthroscopy Dr OSIRIS Andersen     HERNIA REPAIR, UMBILICAL  1/08    incarcerated - dr rockwell     HERNIORRHAPHY INGUINAL  12/21/2012    HERNIORRHAPHY INGUINAL;  Right Inguinal Hernia Repair with mesh ;  Surgeon: Mello Rockwell MD;  Location: [facility] OR     REPLACE VALVE AORTIC N/A 5/17/2016    REPLACE VALVE AORTIC - Dr Bowers     ROTATOR CUFF REPAIR RT/LT  11/10    Rt arthroscopy - Dr OSIRIS Andersen     SURGICAL HISTORY OF -   5/16    AVR, severe AR, aortic root repair     TRACHEOSTOMY PERCUTANEOUS  10/27/2017            SOCIAL HISTORY:[DP1.1]   Social History     Social History     Marital status:      Spouse name: N/A     Number of children: 3     Years of education: 12     Social History Main Topics     Smoking status: Former Smoker     Packs/day: 1.00     Years: 35.00     Quit date: 4/1/2016     Smokeless tobacco: Never Used      Comment: 4/2016     Alcohol use 0.0 oz/week     0 Standard drinks or equivalent per week      Comment: 1 drink per week avg, seldom     Drug use: Yes      Comment: marijuana- daily     Sexual activity: Yes     Partners: Female     Other Topics Concern     Caffeine Concern Yes     3 cups     Sleep Concern No     Special Diet No     trying to watch salt     Exercise Yes     walking     Seat Belt No     Parent/Sibling W/ Cabg, Mi Or Angioplasty Before 65f 55m? No     Social History Narrative[AS1.2]       FAMILY HISTORY:[DP1.1]   Family History   Problem Relation Age of Onset     Genetic Disorder Mother      Bone plateover growth Agustin. shoulder surgeries     CANCER Mother       brain cancer     Alzheimer Disease Father[AS1.2]        ALLERGIES:      Allergies   Allergen Reactions     Lisinopril Swelling     One-sided facial swelling after being on lisinopril/HCTZ for one week.        MEDICATIONS:    No current facility-administered medications on file prior to encounter.   Current Outpatient Prescriptions on File Prior to Encounter:  ARIPiprazole (ABILIFY) 2 MG tablet Take 3 tablets (6 mg) by mouth daily   hydrochlorothiazide (HYDRODIURIL) 25 MG tablet Take 0.5 tablets (12.5 mg) by mouth daily   labetalol (NORMODYNE) 100 MG tablet Take 1 tablet (100 mg) by mouth every 8 hours   D5W 1,000 mL with nafcillin 12 g continuous infusion Inject 12 g into the vein every 24 hours   famotidine (PEPCID) 40 MG/5ML suspension Take 2.5 mLs (20 mg) by mouth 2 times daily   acetaminophen (TYLENOL) 32 mg/mL solution 20.3 mLs (650 mg) by Oral or Feeding Tube route every 6 hours as needed for fever or mild pain   aspirin 81 MG chewable tablet 1 tablet (81 mg) by Oral or Feeding Tube route daily   fiber modular (NUTRISOURCE FIBER) packet 1 packet by Per Feeding Tube route 3 times daily   mirtazapine (REMERON) 7.5 MG TABS tablet GIVE 3 TABLETS(22.5MG) VIA G-TUBE AT BEDTIME   loperamide (IMODIUM A-D) 2 MG tablet 2 tab by g tube 3 times daily PRN for diarrhea   potassium chloride (KLOR-CON) 20 MEQ Packet    QUEtiapine (SEROQUEL) 25 MG tablet 1 tablet (25 mg) by Oral or Feeding Tube route 4 times daily as needed   venlafaxine (EFFEXOR) 100 MG tablet Take 1 tablet (100 mg) by mouth 2 times daily   ondansetron (ZOFRAN) 4 MG tablet Take 1-2 tablets (4-8mg) every 8 hours as needed for nausea   amLODIPine (NORVASC) 5 MG tablet Take 1 tablet (5 mg) by mouth daily   lactobacillus TABS Take 1 tablet by mouth 3 times daily   Menthol-Zinc Oxide 0.44-20.6 % OINT 1 application topically TID PRN for perianal irritation   Miconazole Nitrate 2 % POWD Apply in folds   moxifloxacin (VIGAMOX) 0.5 % ophthalmic solution Place 1 drop  Into the left eye 3 times daily   ketotifen (ZADITOR/REFRESH ANTI-ITCH) 0.025 % SOLN ophthalmic solution Place 1 drop Into the left eye 3 times daily   White Petrolatum-Mineral Oil (SYSTANE NIGHTTIME) OINT    atorvastatin (LIPITOR) 40 MG tablet TAKE 1 TABLET(40 MG) BY MOUTH DAILY   rifampin (REIFADEN) 25 mg/mL SUSP 12 mLs (300 mg) by Oral or Feeding Tube route 2 times daily   polyethylene glycol (MIRALAX/GLYCOLAX) Packet Take 17 g by mouth daily as needed for constipation   multivitamins with minerals (CERTAVITE/CEROVITE) LIQD liquid 15 mLs by Per Feeding Tube route daily       PHYSICAL EXAMINATION:  Temp:  [36.4  C (97.6  F)-36.8  C (98.2  F)] 36.8  C (98.2  F)  Heart Rate:  [] 90  Resp:  [12-18] 16  BP: (105-153)/() 118/89  SpO2:  [93 %-99 %] 97 %  General: intubated and sedated   HEENT: Normocephalic, atraumatic.    Chest wall: Symmetric thorax. Median sternotomy dressing c/d/i.   Respiratory: Equal BS bilateraly. Intubated and mechanically ventilated.  Cardiovascular:[DP1.1] RRR[AS1.1], chest tubes draining[DP1.1] serosanguinous fluid[AS1.1].[DP1.1] Cool extremities[AS1.1]  Gastrointestinal: Abdomen soft, non-distended.  Genitourinary: Espinoza catheter in place.   Skin: As noted above. No rashes or lesions appreciated.   Lines:[DP1.1] R IJ[AS1.1] CVC,[DP1.1] L radial[AS1.1] arterial line, OGT.[DP1.1] R PICC[AS1.1]    LABS: Reviewed.   Arterial Blood Gases     Recent Labs  Lab 12/19/17  1617 12/19/17  1513 12/19/17  1427 12/19/17  1402   PH 7.44 7.35 7.31* 7.37   PCO2 41 48* 51* 47*   PO2 62* 340* 354* 327*   HCO3 28 27 26 27     Complete Blood Count     Recent Labs  Lab 12/19/17  1617 12/19/17  1515 12/19/17  1513 12/19/17  1427 12/19/17  1415 12/19/17  1402  12/19/17  0606 12/18/17  0359 12/17/17 2024 12/17/17  0019   WBC  --   --   --   --   --   --   --  11.2* 9.5 10.3 11.2*   HGB 8.2*  --  7.7* 8.1*  --  6.9*  < > 10.7* 10.5* 10.5* 10.6*   PLT  --  217  --   --  294  --   --  438 423 428 420   < >  = values in this interval not displayed.  Basic Metabolic Panel    Recent Labs  Lab 12/19/17  1617 12/19/17  1513 12/19/17  1427 12/19/17  1402  12/19/17  0606 12/18/17  0359 12/17/17 2024 12/17/17  0730    143 140 137  < > 138 138 140 137   POTASSIUM 3.1* 3.5 4.1 3.7  < > 3.3* 3.8 3.4 3.6   CHLORIDE  --   --   --   --   --  100 102 102 102   CO2  --   --   --   --   --  29 29 30 27   BUN  --   --   --   --   --  9 9 10 10   CR  --   --   --   --   --  1.38* 1.26* 1.22 1.13   * 140* 166* 161*  < > 112* 109* 105* 111*   < > = values in this interval not displayed.  Liver Function Tests    Recent Labs  Lab 12/19/17  1515 12/19/17  0606 12/18/17  0359 12/17/17 2024 12/17/17  0019   AST  --   --  32 29 33   ALT  --   --  30 32 34   ALKPHOS  --   --  92 94 102   BILITOTAL  --   --  1.3 1.0 1.3   ALBUMIN  --   --  1.5* 1.6* 1.6*   INR 1.56* 1.27* 1.12 1.10 1.06     Pancreatic Enzymes  No lab results found in last 7 days.  Coagulation Profile    Recent Labs  Lab 12/19/17  1515 12/19/17  0606 12/18/17  0359 12/17/17 2024   INR 1.56* 1.27* 1.12 1.10   PTT 37  --  31 34     Lactate  Invalid input(s): LACTATE    IMAGING:  Results for orders placed or performed during the hospital encounter of 12/16/17   CT Chest/Abd/Pelvis Angio w Processing     Value    Radiologist flags (AA)     Cranial migration of the root graft/prosthetic valve    Narrative    EXAMINATION: CTA ANGIOGRAM CHEST/ABD/PELVIS W PROCESSING, 12/17/2017  11:31 AM    TECHNIQUE: Helical CT images from the thoracic inlet through the  symphysis pubis were obtained with contrast. Contrast dose: 100cc of  Isovue 370    COMPARISON: Chest radiograph 12/16/2017, PET CT 10/19/2017, CT  10/6/2017    HISTORY: S/p Bentall procedure w/ known aortic abscess    FINDINGS:    Chest:   Fluid collection vs aneurysm surrounding the aortic root, measuring  approximately 8.9 x 10.3 cm compared to 9.5 x 9.3 cm previously, This  fluid collection/aneurysm now diffusely  inhomogeneously fills with  intravenous contrast, new since the previous exam. The fluid  collection or aneurysm of the aortic root remnant surrounding the  aortic root graft appears in communication with the subvalvular  portion aortic root graft (series 13, image 53) and may be a perigraft  leak into the native aortic root remnant. Postoperative changes of  aortic valvular and root replacement. Though it is possible that the  root graft has unseated from the left ventricle in the cranial  direction creating the leak into the root remnant. The ascending  aortic wall is poorly visualized in some areas, presumably  discontinuous given contrast filling of the periaortic fluid  collection (series 13, image 34). No dissection flap seen in the  thoracic aorta. No substantial aortic dilation.    The visualized thyroid parenchyma, aortic branching pattern,  pericardium, and esophagus are unremarkable. Scattered prominent  subcentimeter mediastinal lymph nodes with an enlarged 1.3 cm  subcarinal lymph node, similar to priors.    The central tracheal tree is patent. No pneumothorax, pleural  effusion, or focal airspace consolidation. Mild upper lobe predominant  central lobular and paraseptal emphysema. Scattered calcified  granulomas.    Abdomen and pelvis:  The liver, gallbladder, spleen, and pancreas are unremarkable.  Scattered focal areas of scarring in the posterior right kidney. No  hydronephrosis. Mildly diffusely thickened adrenal glands without  focal mass. Enlarged prostate with diffuse bladder wall thickening and  punctate focus of gas within the bladder, presumably related to recent  catheterization. No intra-abdominal free air or free fluid.  Percutaneous gastrostomy tube. No dilated loops of bowel. The appendix  is normal. Moderate to severe atherosclerotic ossifications in the  normal caliber abdominal aorta. The major abdominal vasculature is  patent. No lymphadenopathy in the abdomen or pelvis.    Bones and  soft tissues:  Prominent fat in the left inguinal canal. Multilevel degenerative  changes in the spine with prominent intravertebral disc herniations,  particularly in the superior vertebral endplate of L5. No acute or  worrisome osseous lesions.        Impression    IMPRESSION:   1. The periaortic fluid collection or aneurysm is slightly increased  in size, however there is new diffuse contrast opacification of this  area and suspected perigraft subvalvular leak into the native  ascending aortic remnant. This area also appears to communicate with  the left ventricle, and that the root graft with valve replacement has  migrated cranially and  from the LV.  2. Diffuse bladder wall thickening, which may be related to bladder  outlet obstruction or decompressed state.      [Critical Result: Cranial migration of the root graft/prosthetic valve  with separation from the left ventricle and resulting subvalvular leak    Finding was identified on 12/17/2017 11:41 AM.     Dr. Ma was contacted by Dr. Russo at 12/17/2017 12:08 PM and  verbalized understanding of the critical finding is new opacification  of the periaortic fluid collection/aneurysm and communication with the  left ventricle. Dr. Russo contacted Dr. Ma at 1:15 PM on 12/17/2017  to clarify that the AV prosthesis and root graft has  from  the LV with a resulting subvalvular leak into the native ascending  aortic remnant which is slightly increased in size.    I have personally reviewed the examination and initial interpretation  and I agree with the findings.    SAMANTHA CHRISTOPHER MD   US Lower Extremity Venous Mapping Bilateral    Narrative    Exam: Ultrasound Doppler vein mapping of bilateral lower extremities  dated  12/18/2017 3:59 PM    Comparison study: None available    Clinical history: Preoperative evaluation    Ordering clinician: Dr. Rodriguez    Technique: Grayscale (B-mode) with duplex Doppler ultrasound, along  with compression and  augmentation, of the lower extremity veins.    RIGHT LEG:    CFV: Thrombus: No    GSV:  SFJ: Thrombus: No, 3.1 mm  Proximal thigh: Thrombus: No, 3.0 mm  Mid thigh: Thrombus: No, 3.0 mm  Distal thigh: Thrombus: No, 3.2 mm  Knee: Thrombus: No, 3.2 mm  Superior calf: Thrombus: No, 3.0 mm  Mid calf: Thrombus: No, 2.0 mm  Distal calf: Thrombus: No, 3.2 mm    LEFT LEG:    CFV: Thrombus: No    GSV:  SFJ: Thrombus: No, Wall thickness: , 2.4 mm  Proximal thigh: Thrombus: No, Wall thickness: , 3.3 mm  Mid thigh: Not seen  Distal thigh: Not seen  Knee: Thrombus: No, 3.0 mm  Superior calf: Thrombus: No, 2.0 mm  Mid calf: Thrombus: No,  2.7 mm  Distal calf: Thrombus: No, 2.1 mm      Impression    Impression:   Great saphenous vein diameters in both lower extremities as described  above. Approximately 6 to 7 cm of the great saphenous vein in the mid  and distal thigh is absent, compatible with the patient's history of  prior left great saphenous vein harvesting. No great saphenous vein  thrombus or common femoral vein thrombus identified.[DP1.1]                Revision History        User Key Date/Time User Provider Type Action    > AS1.2 12/19/2017  8:37 PM Christian Rodriguez MD Resident Sign     AS1.1 12/19/2017  8:33 PM Christian Rodriguez MD Resident      DP1.1 12/19/2017  5:17 PM Haseeb Judd MD Fellow Share            H&P by Cong Lei MD at 12/17/2017 12:49 AM     Author:  Cong Lei MD Service:  Cardiology Author Type:  Resident    Filed:  12/17/2017  3:23 AM Date of Service:  12/17/2017 12:49 AM Creation Time:  12/17/2017 12:49 AM    Status:  Attested :  Cong Lei MD (Resident)    Cosigner:  Tacho Muñoz MD at 12/17/2017  6:24 PM        Attestation signed by Tacho Muñoz MD at 12/17/2017  6:24 PM (Updated)        Tacho PIEDRA, saw this patient with the resident and agree with the resident s findings and plan of care as documented in the resident s note.       I personally reviewed vital signs,  medications, labs and imaging.     Key findings:   -Patient presented with signs of HFpEF ([place] BNP to 01514). Hx of bioprosthetic AVR (for severe AR) and aortic root repair, later complicated by valve endocartitis and moises-aortic root abscess and ischemic CVA.   -Initial bedside echo evaluation is concerning for severe AI. But the patient has stable vitals signs with normal-to-high BP, HR at 99 bpm. Normal O2 sat.  -Plan is IV diuresis, TTE, repeat chest CT with CVTS evaluation.    Tacho Muñoz MD   Division of Cardiology  [phone]                                     [facility]   Cardiology 1  History and Physical      Date of Admission:  12/16/2017[PR1.1]    Assessment & Plan[PR1.2]    Mr Miguel is a 62 year old male with a PMH of aortic stenosis with ascending aortic aneurysm/CAD s/p aortic valve replacement and aortic root replacement with composite graft (April 2016), HFpEF, COPD, HTN, HLD. He was found to have a large moises-aortic abscess around his replaced root & valve, and multiple strokes from septic emboli from the aorta on a recent admission in 10/2017.  Was discharged to an LTAC recently, now presents with worsening shortness of breath and[PR1.1] concern for severe aortic regurgiation on bedside echo[PR1.3].[PR1.1]           Acute vs. Subacute aortic[PR1.4] regurgitation.[PR1.5]    Heart failure with reduced ejection fraction, valvular cardiomyopathy  H/O[PR1.6] MSSA bacteremia[PR1.1]:[PR1.6] Moises-aortic abscess in setting of replaced aortic root and valve[PR1.1]  Type II MI: Troponin peaked at .574, now down trending.    - volume status: Appears euvolemic following Lasix 40 mg IV x 1 at outside ER.[PR1.6]  Diuretics PRN[PR1.3]  - Labetalol 100 mg TID, Hold amlodipine and HCTZ.  Add on further afterload reduction as needed in the AM.[PR1.6]  - Continue Naficillin and Rifampin.    - Trend CRP[PR1.1], B[PR1.6]lood cultures x2[PR1.1] in the AM[PR1.7].[PR1.1]   - Close monitoring of vitals  and low threshold to transfer to the ICU.    - CVTS consult in the AM.    - Formal OWEN in the AM for further evaluation of valve.[PR1.6]     Recent CVA, embolic.    - Baseline left sided facial droop, right sided weakness upper extremity > lower.    - Monitor neuro checks.      Anxiety/Depression  - On multiple medications.  His wife would like an evaluation by pyscholog/pschiatry for further discussio nregarding these medications. In addition, his QTc is prolonged to 530.   - Consider pyschiatry consultation in the AM[PR1.1] to discuss patient/wife concerns regarding medications additionally possible change in medications given Qtc.   - Hold seroquel.    - Continue Abilify 2 mg TID (okay with QTc)[PR1.7]. Continue Remeron.[PR1.3]      FEN:[PR1.1] NPO for OWEN in the AM[PR1.6]  DVT Prophylaxis:[PR1.1] Hold heparin subQ, pending surgical evaluation.[PR1.6]    Code Status: Full Code  Disposition: Expected discharge in[PR1.1] >3[PR1.6] days[PR1.1] pending further surgical evaluation for new aortic insufficiency[PR1.6].    Pt[PR1.1] will be formally staffed in the AM, with the Cards 1 team[PR1.6].[PR1.1]    Cong Lei[PR1.2]  Internal Medicine PGY3  [phone][PR1.1]    Primary Care Physician   Dipak Gordillo    Chief Complaint[PR1.2]   Shortness of breath.      Pt and wife provide relevant recent history, remainder obtained from recent hospital records.[PR1.1]      History of Present Illness[PR1.2]   Cricket Miguel is a 62 year[PR1.1] male with past medical history of aortic stenosis with ascending aortic root aneurysm who is status post aortic valve replacement and aortic root replacement with a composite graft in April 2016, mild COPD, history of heart failure with preserved ejection fraction, hypertension, hyperlipidemia who was recently admitted in October 2017 with altered mentation.  He was noted septic and infectious workup revealing for a large periaortic abscess around his replaced aortic root and valve this  was leading to septic emboli resulting in CVA.  He has residual left-sided facial droop, and right-sided upper extremity weakness following that stroke.  He was discharged from the hospital and rehab at an acute care facility and recently discharged over the past week.  The plan was to have several months of IV antibiotics along with rehabilitation following a stroke and then proceeding to surgical placement and repair of the abscessed area.  The patient now presents with approximately 48 hour history of worsening shortness of breath at rest, orthopnea, and PND.  The wife and the patient reported that he was doing relatively well until the past 2-3 days.    The patient was seen at [facility] [place] emergency room, he was noted to be volume overloaded and given Lasix 40 mg IV.  Chest x-ray was revealing for pulmonary edema.  His blood pressure and heart rate were within normal limits and his basic labs were largely unremarkable.  Notable labs however where an elevated BNP and mild troponin elevation.[PR1.5]    Past Medical History[PR1.2]    I have reviewed this patient's medical history and updated it with pertinent information if needed.[PR1.1]   Past Medical History:   Diagnosis Date     Abscess of aortic root 09/2017     AI (aortic insufficiency)      Anxiety      Aortic root dilatation (H)      AR (aortic regurgitation)     severe     Cardiomyopathy (H)      CHF (congestive heart failure), NYHA class II (H)      COPD, mild (H)     spirometry 12/16     CVA (cerebral vascular accident) (H) 09/2017    septic emboli - MSSA - cerebellum, Left MCA, Rt Occipital, Aphasia, Left visual field cut, moderate to severe encephalopathy     Depression 09/2017     Heart murmur      Hyperlipidemia LDL goal <70 10/31/2010     Hypertension goal BP (blood pressure) < 140/90      Inguinal hernia      left lung nodule  1/29/2008     Major depressive disorder, single episode, moderate (H)      Psoriasis childhood     Tobacco use  disorder[PR1.8]        Past Surgical History[PR1.2]   I have reviewed this patient's surgical history and updated it with pertinent information if needed.[PR1.1]  Past Surgical History:   Procedure Laterality Date     ARTHROSCOPY KNEE INCISION AND DRAINAGE Left 10/8/2017    Procedure: ARTHROSCOPY KNEE INCISION AND DRAINAGE;  Arthroscopic Incision and Drainage of Left Knee;  Surgeon: Lucretia Munoz MD;  Location: [facility] OR     HC SHOULDER ARTHROSCOPY, DX      Arthroscopy, Shoulder RT Dr OSIRIS Andersen     HC SHOULDER ARTHROSCOPY, DX      LT arthroscopy Dr OSIRIS Andersen     HERNIA REPAIR, UMBILICAL      incarcerated - dr rockwell     HERNIORRHAPHY INGUINAL  2012    HERNIORRHAPHY INGUINAL;  Right Inguinal Hernia Repair with mesh ;  Surgeon: Mello Rockwell MD;  Location: [facility] OR     REPLACE VALVE AORTIC N/A 2016    REPLACE VALVE AORTIC - Dr Bowers     ROTATOR CUFF REPAIR RT/LT  11/10    Rt arthroscopy - Dr OSIRIS Andersen     SURGICAL HISTORY OF -       AVR, severe AR, aortic root repair     TRACHEOSTOMY PERCUTANEOUS  10/27/2017            Prior to Admission Medications   Prior to Admission Medications   Prescriptions Last Dose Informant Patient Reported? Taking?   ARIPiprazole (ABILIFY) 2 MG tablet   Yes No   Sig: Take 3 tablets (6 mg) by mouth daily   D5W 1,000 mL with nafcillin 12 g continuous infusion   Yes No   Sig: Inject 12 g into the vein every 24 hours   Menthol-Zinc Oxide 0.44-20.6 % OINT   Yes No   Si application topically TID PRN for perianal irritation   Miconazole Nitrate 2 % POWD   Yes No   Sig: Apply in folds   QUEtiapine (SEROQUEL) 25 MG tablet   Yes No   Si tablet (25 mg) by Oral or Feeding Tube route 4 times daily as needed   White Petrolatum-Mineral Oil (SYSTANE NIGHTTIME) OINT   Yes No   acetaminophen (TYLENOL) 32 mg/mL solution   No No   Si.3 mLs (650 mg) by Oral or Feeding Tube route every 6 hours as needed for fever or mild pain   amLODIPine (NORVASC) 5 MG tablet    Yes No   Sig: Take 1 tablet (5 mg) by mouth daily   aspirin 81 MG chewable tablet   No No   Si tablet (81 mg) by Oral or Feeding Tube route daily   atorvastatin (LIPITOR) 40 MG tablet   No No   Sig: TAKE 1 TABLET(40 MG) BY MOUTH DAILY   famotidine (PEPCID) 40 MG/5ML suspension   No No   Sig: Take 2.5 mLs (20 mg) by mouth 2 times daily   fiber modular (NUTRISOURCE FIBER) packet   No No   Si packet by Per Feeding Tube route 3 times daily   hydrochlorothiazide (HYDRODIURIL) 25 MG tablet   Yes No   Sig: Take 0.5 tablets (12.5 mg) by mouth daily   ketotifen (ZADITOR/REFRESH ANTI-ITCH) 0.025 % SOLN ophthalmic solution   Yes No   Sig: Place 1 drop Into the left eye 3 times daily   labetalol (NORMODYNE) 100 MG tablet   Yes No   Sig: Take 1 tablet (100 mg) by mouth every 8 hours   lactobacillus TABS   Yes No   Sig: Take 1 tablet by mouth 3 times daily   loperamide (IMODIUM A-D) 2 MG tablet   Yes No   Si tab by g tube 3 times daily PRN for diarrhea   mirtazapine (REMERON) 7.5 MG TABS tablet   No No   Sig: GIVE 3 TABLETS(22.5MG) VIA G-TUBE AT BEDTIME   moxifloxacin (VIGAMOX) 0.5 % ophthalmic solution   Yes No   Sig: Place 1 drop Into the left eye 3 times daily   multivitamins with minerals (CERTAVITE/CEROVITE) LIQD liquid   No No   Sig: 15 mLs by Per Feeding Tube route daily   ondansetron (ZOFRAN) 4 MG tablet   No No   Sig: Take 1-2 tablets (4-8mg) every 8 hours as needed for nausea   polyethylene glycol (MIRALAX/GLYCOLAX) Packet   No No   Sig: Take 17 g by mouth daily as needed for constipation   potassium chloride (KLOR-CON) 20 MEQ Packet   Yes No   rifampin (REIFADEN) 25 mg/mL SUSP   No No   Si mLs (300 mg) by Oral or Feeding Tube route 2 times daily   venlafaxine (EFFEXOR) 100 MG tablet   No No   Sig: Take 1 tablet (100 mg) by mouth 2 times daily      Facility-Administered Medications: None     Allergies   Allergies   Allergen Reactions     Lisinopril Swelling     One-sided facial swelling after being on  lisinopril/HCTZ for one week.        Social History[PR1.2]   I have reviewed this patient's social history and updated it with pertinent information if needed. Cricket Miguel[PR1.1]  reports that he quit smoking about 20 months ago. He has a 35.00 pack-year smoking history. He has never used smokeless tobacco. He reports that he drinks alcohol. He reports that he uses illicit drugs.    Family History[PR1.2]   I have reviewed this patient's family history and updated it with pertinent information if needed.[PR1.1]   Family History   Problem Relation Age of Onset     Genetic Disorder Mother      Bone plateover growth Agustin. shoulder surgeries     CANCER Mother      brain cancer     Alzheimer Disease Father        Review of Systems[PR1.2]   The 10 point Review of Systems is negative other than noted in the HPI or here.[PR1.1]     Physical Exam   Temp: 98  F (36.7  C) Temp src: Axillary BP: 131/89   Heart Rate: 99 Resp: 20 SpO2: 95 % O2 Device: None (Room air)[PR1.2]    Vital Signs with Ranges[PR1.1]  Temp:  [98  F (36.7  C)-98.6  F (37  C)] 98  F (36.7  C)  Heart Rate:  [69-99] 99  Resp:  [15-20] 20  BP: (115-141)/() 131/89  SpO2:  [95 %-99 %] 95 %  182 lbs 15.71 oz[PR1.2]    GEN:  Alert, oriented x 3, appears comfortable, NAD.  HEENT:  Normocephalic/atraumatic, no scleral icterus, no nasal discharge, mouth moist.  CV:[PR1.1]  Sinus tachycardia[PR1.3] JVP is non-elevated[PR1.6]   LUNGS:[PR1.1]  Bibasilar crackles[PR1.6]  ABD:  Active bowel sounds, soft, non-tender/non-distended.  No rebound/guarding/rigidity.  EXT:  No edema or cyanosis.  Hands/feet warm to touch with good signs of peripheral perfusion.   SKIN:  Dry to touch, no exanthems noted in the visualized areas.  NEURO:[PR1.1]  Baseline left sided facial droop.  Right sided weakness upper extremity 3/5, 4/5 in lower extremity. Let side normal 5/5.  Otherwise no new focal changes.[PR1.6]      Data[PR1.2]   Data reviewed today:  I personally reviewed all the  relevant, labs, imaging, and EKG[PR1.1]    Recent Labs  Lab 12/17/17  0019 12/16/17  1935   WBC 11.2* 9.8   HGB 10.6* 10.3*    99    391   INR 1.06  --     137   POTASSIUM 3.7 3.9   CHLORIDE 101 101   CO2 27 28   BUN 10 10   CR 1.14 1.10   ANIONGAP 9 8   IZABELLA 8.9 8.6   * 99   TROPI 0.554* 0.574*       Recent Results (from the past 24 hour(s))   XR Chest 2 Views    Narrative    CHEST TWO VIEW 12/16/2017 7:50 PM     COMPARISON: Frontal chest x-ray 12/2/17    HISTORY: Dyspnea.       Impression    IMPRESSION: Right PICC line with its tip in the low superior vena cava  and median sternotomy changes again noted.    Moderate cardiomegaly again noted. There is increased prominence of  the perihilar pulmonary vasculature and mildly increased interstitial  density in both lungs consistent with congestive failure with early or  mild pulmonary edema. There are no airspace opacities to suggest  pneumonia. There is no pleural effusion or pneumothorax.    NELSY ELKINS MD[PR1.9]                Revision History        User Key Date/Time User Provider Type Action    > PR1.9 12/17/2017  3:23 AM Cong Lei MD Resident Sign     PR1.3 12/17/2017  3:22 AM Cong Lei MD Resident      PR1.6 12/17/2017  2:28 AM Cong Lei MD Resident      PR1.8 12/17/2017  2:25 AM Cong Lei MD Resident      PR1.5 12/17/2017  2:05 AM Cong Lei MD Resident      PR1.4 12/17/2017  1:56 AM Cong Lei MD Resident      PR1.7 12/17/2017  1:23 AM Cong Lei MD Resident      PR1.2 12/17/2017 12:50 AM Cong Lei MD Resident      PR1.1 12/17/2017 12:49 AM Cong Lei MD Resident                   Discharge Summaries     No notes of this type exist for this encounter.         Consult Notes      Consults by Ivette Gonzalez RN at 1/4/2018  2:06 PM     Author:  Ivette Gonzalez RN Service:  MANA Author Type:  Registered Nurse    Filed:  1/4/2018  2:32 PM Date of Service:  1/4/2018  2:06 PM  "Creation Time:  2018  2:26 PM    Status:  Signed :  Ivette Gonzalez RN (Registered Nurse)     Consult Orders:    1. [facility] Evaluation IP Consult: Patient to be seen: Routine - within 24 hours; Call back #: [email or web address] [phone]; New heart failure; Consultant may enter orders: Yes [[id number]] ordered by Benny Unger PA-C at 18 0837                Giovanni a 62 year old  presenting with AVR with complications that followed the procedure.  CORE consult requested.     Giovanni and Meghna were provided with a CHF book.  I reviewed the importance of daily weights, 2 gm sodium diet, 2L fluid restriction and compliance with medications upon hospital discharge. Pt will be going [facility] upon discharge from hospital. RN reinforced importance of the above, especially when pt returns home. [facility] contact phone numbers provided and patient is encouraged to call with any questions or concerns, including any weight gain or loss of 2 or more pounds in 24 hours or 5 or more pounds in 1 week.      Appointment made with Dr. Garzon (cardiologist in [place]) for follow up on 18. He will then decide when it is appropriate for [facility] to see the patient. Thank you for the consult.    Ivette Gonzalez RN BSN   Cardiology Care Coordinator - [facility]  Questions and [phone]  First press #1 for the [employer] and then press #3 for \"Medical Advise\" to reach us Cardiology Nurses.[KH1.1]         Revision History        User Key Date/Time User Provider Type Action    > KH1.1 2018  2:32 PM Ivette Gonzalez RN Registered Nurse Sign            Consults signed by Ben Ivan MD at 2018  7:24 AM      Author:  Ben Ivan MD Service:  Psychiatry Author Type:  Physician    Filed:  2018  7:24 AM Date of Service:  1/3/2018  4:31 PM Creation Time:  1/3/2018  7:01 PM    Status:  Signed :  Ben Ivan MD (Physician)         PSYCHIATRIC " "CONSULTATION:        DATE OF SERVICE:  01/03/2018.        REQUESTING SERVICE AND REASON FOR ADMISSION:  The Surgical Service requested a followup consultation regarding the patient's decisional capacity to make the informed decision to go home.      HISTORY OF PRESENT ILLNESS:  Cricket Miguel is a 62-year-old gentleman who has been previously seen by Dr. Thakkar and Dr. Babin on our service who had suffered a CVA and recently had aortic valve replacement.  The patient currently is pending discharge and has a number of rehabilitation needs, including speech therapy, physical therapy and things of that sort.  There are some issues about placement.  Apparently the family had been looking for a TCU or rehabilitation center.  They have not available find one that they feel comfortable with yet and had then made the decision to take the patient home.  The patient's daughter, Meghna, is his proxy decision maker.  There are questions about the safety of bringing him home with his multiple rehab needs.  I did meet with his daughter.  She was unclear as to whether they would be able to find a TCU for him.  She stated that the patient's girlfriend was out looking for one, visiting various TCUs.  She is concerned that the family would have to pay if he was in the hospital and did not meet criteria.  She stated she felt the family was \"capable of caring for him for a few days.\"      On my interview, the patient was quite dysarthric.  He had trouble finding words and was confused.  In review of the chart he has been quite delirious recently.  I was really unable to carry on any type of meaningful conversation with him.  He did state that he wanted to go home, but I was unable to have any decision about the risks, benefits or his rehabilitation needs with him due to his neurocognitive deficits and dysarthria.      The case was discussed with the physician's assistant from the Cardiothoracic Surgery Service also with Hudson Calabrese, " "MSW, the  who has been working on the case.  There are concerns about the safety of the plan for the patient to go home and there are a number of unclear issues about placement and finances and things of that sort and the family is somewhat confused and upset by all of this.      REVIEW OF SYSTEMS:  This was very difficult to perform due to the patient's dysarthria.  He did have right-sided weakness and denied any other specific medical complaints.      VITAL SIGNS:  Temperature 98.4, respirations 18, pulse 95, blood pressure 113/88.      MENTAL STATUS EXAMINATION:     General appearance and behavior:  The patient is lying in bed.  He has a marked facial droop.  He is alert.  He is quite dysarthric in terms of his speech.  It is very difficult to carry on a conversation with him.  He appeared at times to understand what I was asking him but other times he appeared more confused.  There was a long speech latency.  At one point he said \"I forgot what we were talking about.\"     His mood was slightly irritable.  His affect was appropriate.     His speech was slow and there were long pauses at times.  His language is quite dysarthric.  His attention and concentration was difficult to sort out.  It was hard to tell if he was really responding to what I asked him.  His overall ability to describe recent events was very limited.    It was difficult to assess his fund of knowledge.     His judgment and insight appears impaired. I was really unable to assess with any accuracy due to his confusion and dysarthria.  Muscle bulk and tone:  He had a right-sided weakness.     Abnormal movements:  None noted.      IMPRESSION:  The patient is a 62-year-old gentleman status post stroke and cardiac surgery.  He has been quite delirious recently.  He currently is confused, is markedly dysarthric and has extreme difficulty communicating due to word finding and confusion and dysarthria.  On the basis of my communication with " him, I cannot state that he has decisional capacity at this time.  I believe he should have a substitute decision maker.  From my understanding this is his daughter, Meghna.      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      TREATMENT RECOMMENDATIONS:   1.  Would have his daughter Meghna act as his decision maker due to his lack of capacity.   2.  Would make sure that the plan the family has is safe and realistic for the patient.  Would consider a PM&R consultation.  It is important that the patient has a safe appropriate placement for his multiple rehab needs.   3.  The case was discussed with the cardiothoracic surgery service and social work   4.  Please call or reconsult with any questions, concerns or change in the patient's status.  My number is [phone].         SHENA IVAN MD             D: 2018 16:31   T: 2018 19:01   MT:       Name:     ASHTYN STROUD   MRN:      [id number]        Account:       [id number]   :      1955           Consult Date:  2018      Document: [id number][RW1.1]         Revision History        User Key Date/Time User Provider Type Action    > RW1.1 2018  7:24 AM Shena Ivan MD Physician Sign     [N/A] 1/3/2018  7:01 PM Shena Ivan MD Physician Edit            Consults by Shena Ivan MD at 1/3/2018  6:01 PM     Author:  Shena Ivan MD Service:  Psychiatry Author Type:  Physician    Filed:  1/3/2018  6:01 PM Date of Service:  1/3/2018  6:01 PM Creation Time:  1/3/2018  6:01 PM    Status:  Signed :  Shena Ivan MD (Physician)     Consult Orders:    1. Psychiatry IP Consult: urgent request for consultation regarding patient abillity to decide to go home, capacity screening; Consultant may enter orders: Yes; Patient to be seen: STAT; Call back #: [phone] [[id number]] ordered by Errol Trimble PA at 18 1200                Dictated[RW1.1]     Revision  History        User Key Date/Time User Provider Type Action    > RW1.1 1/3/2018  6:01 PM Ben Ivan MD Physician Sign            Consults by Ben Ivan MD at 1/3/2018  4:33 PM     Author:  Ben Ivan MD Service:  Psychiatry Author Type:  Physician    Filed:  1/3/2018  4:33 PM Date of Service:  1/3/2018  4:33 PM Creation Time:  1/3/2018  4:32 PM    Status:  Signed :  Ben Ivan MD (Physician)         Follow up  Dictated[RW1.1]     Revision History        User Key Date/Time User Provider Type Action    > RW1.1 1/3/2018  4:33 PM Ben Ivan MD Physician Sign            Consults by Lisa Painting MD at 1/3/2018  3:40 PM     Author:  Lisa Painting MD Service:  Physical Medicine and Rehabilitation Author Type:  Physician    Filed:  1/3/2018  3:40 PM Date of Service:  1/3/2018  3:40 PM Creation Time:  1/3/2018  3:39 PM    Status:  Signed :  Lisa Painting MD (Physician)     Consult Orders:    1. Physical Medicine & Rehab Assessment for Rehab Placement Adult IP Consult: Patient to be seen: Routine within 24 hrs; Call back #: [email or web address] [phone]; Assess patient for discharge placement needs, patient wants to go home, please eval safety of patient... [[id number]] ordered by Errol Trimble PA at 01/03/18 1453                PM&R CONSULT    Given that the patient does not have new onset stroke, the consult will be deferred to the admission team for [place] [facility].  Please call if there are any questions at [phone]        Thank you for consulting the PM&R Department.   For any questions, please feel free to page me at [phone]       Lisa Painting MD   Department of PM&R[FI1.1]         Revision History        User Key Date/Time User Provider Type Action    > FI1.1 1/3/2018  3:40 PM Lisa Painting MD Physician Sign            Consults by Evert Tyler MD at 12/28/2017  1:49 PM     Author:  Evert Tyler MD Service:  Otolaryngology/ENT Author  "Type:  Physician    Filed:  1/3/2018  1:01 PM Date of Service:  12/28/2017  1:49 PM Creation Time:  12/28/2017  1:49 PM    Status:  Signed :  Evert Tyler MD (Physician)     Consult Orders:    1. ENT IP Consult: Deviated septum, new since surgery on 12/19; Consultant may enter orders: Yes; Patient to be seen: Routine - within 24 hours [[id number]] ordered by Benny Unger PA-C at 12/28/17 0937                Otolaryngology Consult Note  December 28, 2017      CC: Nasal asymmetry     HPI: Cricket Miguel is a 62 year old male with a past medical history of Psoriasis, MDD, HTN, HLD, prior CVA with hemiparesis, COPD, CHF, cardiomyopathy, Aortic regurgitation s/p dilatation, AI and prior aortic root abscess. I was asked to evaluate his nasal deviation by Dr. Benny Unger of the cardiothoracic team. Per the patient's wife, who was at bedside providing the history, the patient's nose appears \"a little crooked, with a bump\". She first noticed the asymetry following treatment for CVA and heart related problems in October. She thinks the asymmetry is stable. The area hasn't bled, has never appeared red or crusty, hasn't poped or drained fluid, no facial lesions, no nasal drainage. She is concerned that it could be causing nasal obstruction or other problems and would like the nose evaluated while the patient is already inpatient and being worked up for other medical concerns. Patient cannot communicate during the discussion due to medication and current state of health.[CH1.1]    Past Medical History:   Diagnosis Date     Abscess of aortic root 09/2017     AI (aortic insufficiency)     severe     Anxiety      Aortic root dilatation (H)      AR (aortic regurgitation)     severe     Cardiomyopathy (H)      CHF (congestive heart failure), NYHA class II (H)      COPD, mild (H)     spirometry 12/16     CVA (cerebral vascular accident) (H) 09/2017    septic emboli - MSSA - cerebellum, Left MCA, Rt Occipital, Aphasia, " Left visual field cut, moderate to severe encephalopathy     Depression 09/2017     Heart murmur      Hyperlipidemia LDL goal <70 10/31/2010     Hypertension goal BP (blood pressure) < 140/90      Inguinal hernia      left lung nodule  1/29/2008     Major depressive disorder, single episode, moderate (H)      Psoriasis childhood     Tobacco use disorder        Past Surgical History:   Procedure Laterality Date     ARTHROSCOPY KNEE INCISION AND DRAINAGE Left 10/8/2017    Procedure: ARTHROSCOPY KNEE INCISION AND DRAINAGE;  Arthroscopic Incision and Drainage of Left Knee;  Surgeon: Lucretia Munoz MD;  Location: [facility] OR     HC SHOULDER ARTHROSCOPY, DX  2001    Arthroscopy, Shoulder RT Dr OSIRIS Andersen     HC SHOULDER ARTHROSCOPY, DX  1/04    LT arthroscopy Dr OSIRIS Andersen     HERNIA REPAIR, UMBILICAL  1/08    incarcerated - dr rockwell     HERNIORRHAPHY INGUINAL  12/21/2012    HERNIORRHAPHY INGUINAL;  Right Inguinal Hernia Repair with mesh ;  Surgeon: Mello Rockwell MD;  Location: [facility] OR     REPAIR ANEURYSM ASCENDING AORTA N/A 12/19/2017    Procedure: REPAIR ANEURYSM ASCENDING AORTA;  REDO Median STERNOTOMY, FEMORAL CANNULATION, Lysis of adhesions, AORTIC ROOT VALVE HOMOGRAFT with 26mm x 7.0cm Cryolife Aortic valve and conduit;  Surgeon: Pili Bowers MD;  Location: [facility] OR     REPLACE VALVE AORTIC N/A 5/17/2016    REPLACE VALVE AORTIC - Dr Bowers     ROTATOR CUFF REPAIR RT/LT  11/10    Rt arthroscopy - Dr OSIRIS Andersen     SURGICAL HISTORY OF -   5/16    AVR, severe AR, aortic root repair     TRACHEOSTOMY PERCUTANEOUS  10/27/2017            No current outpatient prescriptions on file.          Allergies   Allergen Reactions     Lisinopril Swelling     One-sided facial swelling after being on lisinopril/HCTZ for one week.        Social History     Social History     Marital status:      Spouse name: N/A     Number of children: 3     Years of education: 12     Occupational History     Not on file.      Social History Main Topics     Smoking status: Former Smoker     Packs/day: 1.00     Years: 35.00     Quit date: 4/1/2016     Smokeless tobacco: Never Used      Comment: 4/2016     Alcohol use 0.0 oz/week     0 Standard drinks or equivalent per week      Comment: 1 drink per week avg, seldom     Drug use: Yes      Comment: marijuana- daily     Sexual activity: Yes     Partners: Female     Other Topics Concern     Caffeine Concern Yes     3 cups     Sleep Concern No     Special Diet No     trying to watch salt     Exercise Yes     walking     Seat Belt No     Parent/Sibling W/ Cabg, Mi Or Angioplasty Before 65f 55m? No     Social History Narrative       Family History   Problem Relation Age of Onset     Genetic Disorder Mother      Bone plateover growth Agustin. shoulder surgeries     CANCER Mother      brain cancer     Alzheimer Disease Father[CH1.2]        ROS: 12 point review of systems is negative unless noted in HPI.    PHYSICAL EXAM:  General: laying in bedside chair, no acute distress[CH1.1]  /71 (BP Location: Left arm)  Pulse 105  Temp 98.5  F (36.9  C) (Oral)  Resp 18  Wt 81.7 kg (180 lb 1.6 oz)  SpO2 97%  BMI 27.38 kg/m2[CH1.2]  HEAD: normocephalic, atraumatic  Face:sigficicant asymmetry with left sided facial droop, wasted muscled of facial expression on the lower left face causing a tissue laxity an droop, although the patient cannot follow commands for me he appears to be a HB 6/6 on the left. no swelling, edema, or erythema. Nasal vestibule appears patent, with a midline septum and moderate anterior crusting, he has a prominent L lower lateral cartilage with inversion of the natural crural fold, bilaterally he has large lower lateral cartilages. No fluid collection, no concern for nasal obstruction or infection  Neck: no LAD, trachea midline    ROUTINE IP LABS (Last four results)  BMP[CH1.1]  Recent Labs  Lab 12/28/17  0727 12/27/17  0430 12/26/17  0623 12/25/17  0951    141 141 140    POTASSIUM 4.2 3.8 4.0 4.0   CHLORIDE 104 106 107 106   IZABELLA 9.5 8.6 8.9 8.9   CO2 26 28 26 26   BUN 38* 41* 35* 29   CR 0.84 0.74 0.77 0.80   * 159* 128* 146*[CH1.2]     CBC[CH1.1]  Recent Labs  Lab 12/28/17  0727 12/27/17  0430 12/26/17  0623 12/25/17  0809   WBC 14.0* 14.0* 15.8* 14.1*   RBC 2.96* 2.77* 2.80* 2.83*   HGB 9.0* 8.4* 8.5* 8.6*   HCT 30.1* 27.7* 28.1* 28.1*   * 100 100 99   MCH 30.4 30.3 30.4 30.4   MCHC 29.9* 30.3* 30.2* 30.6*   RDW 18.7* 18.3* 18.0* 18.2*   * 410 370 364[CH1.2]     INR[CH1.1]No lab results found in last 7 days.[CH1.2]    Assessment and Plan  Cricket Miguel is a 62 year old male with an asymmetric nose. Although the patient's wife could not provide a picture of the patient's nose prior to the recent medical cares, this is likely a long standing asymmetry. I believe it to be unrelated to his ongoing medical cares. He hasn't had recent nasal trauma or nasal surgery. With the recent CVA and facial paralysis, I suspect that the nasal asymmetry has become more obvious and prominent to the patient's wife. There is no concern for nasal obstruction. Nasal saline may help with crusting which is present on anterior rhinoscopy exam and could help keep the tissues lubricated, moist and free of excessive bleeding.     Brent Cuevas MD PGY - 1  Otolaryngology-Head & Neck Surgery  Please page ENT with questions by dialing [phone] and entering job code [phone] when prompted.    Case was discussed with chief resident Caroline and Dr Tyler.[CH1.1]        ADDENDUM:  I have not seen Mr. Miguel, but talked to Dr Cuevas and agree with the plan.  I suggest f/u with Dr. Kelly Gardner if the isues with facial paralysis persist and are a priority for the patient and his family.    Evert Tyler MD  Otolaryngology/Head & Neck Surgery  [phone][BY1.1]               Revision History        User Key Date/Time User Provider Type Action    > BY1.1 1/3/2018  1:01 PM Evert Tyler MD Physician  Sign     [N/A] 1/2/2018 11:55 AM Brent Cuevas MD Resident Sign     CH1.2 12/28/2017  6:51 PM Brent Cuevas MD Resident Sign     CH1.1 12/28/2017  1:49 PM Brent Cuevas MD Resident             Consults signed by Jamey Thakkar MD at 1/2/2018  3:58 PM      Author:  Jamey Thakkar MD Service:  Psychiatry Author Type:  Physician    Filed:  1/2/2018  3:58 PM Date of Service:  1/2/2018 12:32 PM Creation Time:  1/2/2018 12:55 PM    Status:  Signed :  Jamey Thakkar MD (Physician)         PSYCHIATRIC CONSULTATION         TODAY'S DATE:  01/02/2018       IDENTIFICATION:  Mr. Cricket Miguel is a 62-year-old white male who is status post a stroke.  I am asked to evaluate his antidepressant regimen and mental status by Benny Unger PA-C.      The main question for this consultation is whether the patient should be started on fluoxetine.  I note that the patient is currently taking fluconazole and there is a significant drug-drug interaction with fluconazole.  The patient was sleeping quite soundly during my interaction and the patient's daughter did not want me to wake him up as he was sleeping soundly, however, I did interview the daughter extensively and it is quite clear the family wants less medications.  They are not interested in starting any new meds at this point.  The patient is on temazepam at bedtime which would not be my first choice; however, he has been on it for some time.  At the present time, I would not change it.  He is on very low dose p.r.n. lorazepam and also very low dose Zyprexa.  His antipsychotic medications have been tapered significantly and he is on far less medications than my last consultation on 12/26.  The treatment team and the family tells me that his delirium has improved quite significantly.  The plan is to send him to [facility] for rehabilitation and then home with home cares.  It seems that now would be a poor time to make major medication changes so  for now I would recommend leaving him on his same medications which seem to be working for him.  If he does develop a depressive episode, I note that in the past he was on Effexor.  An alternative would be Lexapro, which has very few drug-drug interactions.      MENTAL STATUS EXAMINATION:  On my interview, the patient was sleeping quite soundly and I was asked not to wake him up.  I note that his recent vital signs included a temperature of 97.6, heart rate of 74, respiration rate of 16 with 98% oxygen saturation and a blood pressure of 115/76.      ASSESSMENT:  Improving delirium after cerebrovascular accident.      RECOMMENDATIONS:  Continue current medications.  I have discussed this case with both the treatment team and station .         JAMEY THAKKAR MD             D: 2018 12:32   T: 2018 12:54   MT: [id number]-PIERRE      Name:     ASHTYN STROUD   MRN:      [id number]        Account:       [id number]   :      1955           Consult Date:  2018      Document: [id number][WM1.1]         Revision History        User Key Date/Time User Provider Type Action    > WM1.1 2018  3:58 PM Jamey Thakkar MD Physician Sign            Consults by Jamey Thakkar MD at 2018 12:33 PM     Author:  Jamey Thakkar MD Service:  Psychiatry Author Type:  Physician    Filed:  2018 12:33 PM Date of Service:  2018 12:33 PM Creation Time:  2018 12:33 PM    Status:  Signed :  Jamey Thakkar MD (Physician)     Consult Orders:    1. Psychiatry IP Consult: Reassess mental status, stroke team recs fluoxetine, okay to resume PTA dose?; Consultant may enter orders: Yes; Patient to be seen: Routine; Call back #: [facility] [phone] [[phone]] ordered by Benny Unger PA-C at 18 0910                Patient seen and chart reviewed. Formal consult dictated.(F/U)    Jamey Thakkar MD[WM1.1]     Revision History        User Key Date/Time User Provider Type  Action    > WM1.1 2018 12:33 PM Jamey Thakkar MD Physician Sign            Consults by Javier Ace MD at 2017  8:30 AM     Author:  Javier Ace MD Service:  Neuro ICU Author Type:  Resident    Filed:  2017  1:58 PM Date of Service:  2017  8:30 AM Creation Time:  2017  8:22 AM    Status:  Attested :  Javier Ace MD (Resident)    Cosigner:  Walter Johnson MD at 2018 12:02 PM        Attestation signed by Walter Johnson MD at 2018 12:02 PM        Attestation:  Physician Attestation   I, Walter Johnson, saw this patient with the resident and agree with the resident s findings and plan of care as documented in the resident s note.      I personally reviewed vital signs, medications, labs and imaging.    Key findings: He is looking much better than the last time that I examined him back in October. He has made good progress and I would expect him to continue to improve to some degree. There may be intermittent set backs in terms of function in the setting of acute illness, but I would not expect those setbacks to be permanent and would expect those to recover as the insult resolves. He does still have elements of aphasia with respect to understanding which can make interactions difficult. He has good motor movement of the RUE compared to my prior exam as well. He will need continued aggressive rehabilitation to improve strength and linguistic abilities.     Walter Johnson  Date of Service (when I saw the patient): 17                               [facility], [place]      Neurology Stroke Consult    Patient Name: Cricket Miguel  : 1955 MRN#: [id number]    STROKE DATA    Stroke Code:  Stroke code not activated.  Time patient seen:  2017 0800  Last known normal (pt's baseline):  multiple days ago    National Institutes of Health Stroke Scale (at presentation)  NIHSS  done at:  time patient seen      Score    Level of consciousness:  (1)   Not alert; arousable w/ minor stim to obey/answer/respond     LOC questions:  (2)   Answers neither question correctly    LOC commands:  (1)   Performs one task correctly    Best gaze:  (0)   Normal    Visual:  (1)   Partial hemianopia    Facial palsy:  (2)   Partial paralysis (total/near total of lower face)    Motor arm (left):  (0)   No drift    Motor arm (right):  (2)   Some effort against gravity    Motor leg (left):  (0)   No drift    Motor leg (right):  (3)   No effort against gravity    Limb ataxia:  (1)   Present in one limb    Sensory:  (1)   Mild to moderate sensory loss    Best language:  (1)   Mild to moderate aphasia    Dysarthria:  (1)   Mild to moderate dysarthria    Extinction and inattention:  (0)   No abnormality        NIHSS Total Score:  15        Dysphagia Screen  Time of screening:[BB1.1] 12/31/2017[BB1.2] 0800  Screening results: Per Nursing     ASSESSMENT & RECOMMENDATIONS     The patient was seen for[BB1.1] 'hx of L sided stroke, s/p aortic root with persistent Sx, family requesting Neuro consult for prognosis'[BB1.3]    62 year old gentleman w/ h/o AVR and ascending aortic repair in 5/2016, 1st degree AVB present, observing with serial EKGs. admitted 10/2017 w acute mental status changes found to have large moises-aortic fluid collection and multiple strokes likely septic emboli. The strokes involved the L cerebellar, L MCA, and R occipital regions. Small microhemorrhages were observed.  Cerebral angiogram showed no evidence of mycotic aneurysm.   Blood cultures grew MSSA at the time intermittently.  Likely source of L knee septic arthritis which underwent arthroscopic irrigation and debridement.  Admitted 12/2017 for AI now s/p root replacement and AVR.  Heart prognosis apparently good.[BB1.1]  From neurological standpoint, the patient is making progress, mRS now is a 4 with possibility for improvement.  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, and[BB1.4]      Recommendations:[BB1.1]  -prognosis is good from stroke-neurology perspective and patient has made considerable improvement in 2 months, most recovery is made within the first 6 months and stabilizes by 12  -PT/OT recs are sound, suggest adding fluoxetine 20mg per FLAME trial evidence of motor improvement  -f/u neurology stroke clinic upon discharge[BB1.4] within 3 months[BB1.5]        HPI  \"62 year male with past medical history of aortic stenosis with ascending aortic root aneurysm who is status post aortic valve replacement and aortic root replacement with a composite graft in April 2016, mild COPD, history of heart failure with preserved ejection fraction, hypertension, hyperlipidemia who was recently admitted in October 2017 with altered mentation.  He was noted septic and infectious workup revealing for a large periaortic abscess around his replaced aortic root and valve this was leading to septic emboli resulting in CVA.  He has residual left-sided facial droop, and right-sided upper extremity weakness following that stroke.  He was discharged from the hospital and rehab at an acute care facility and recently discharged over the past week.  The plan was to have several months of IV antibiotics along with rehabilitation following a stroke and then proceeding to surgical placement and repair of the abscessed area.  The patient now presents with approximately 48 hour history of worsening shortness of breath at rest, orthopnea, and PND.  The wife and the patient reported that he was doing relatively well until the past 2-3 days.     The patient was seen at [facility] emergency room, he was noted to be volume overloaded and given Lasix 40 mg IV.  Chest x-ray was revealing for pulmonary edema.  His blood pressure and " "heart rate were within normal limits and his basic labs were largely unremarkable.  Notable labs however where an elevated BNP and mild troponin elevation.\"    Pertinent Past Medical/Surgical HistoryPast Medical History:   Diagnosis Date     Abscess of aortic root 09/2017     AI (aortic insufficiency)     severe     Anxiety      Aortic root dilatation (H)      AR (aortic regurgitation)     severe     Cardiomyopathy (H)      CHF (congestive heart failure), NYHA class II (H)      COPD, mild (H)     spirometry 12/16     CVA (cerebral vascular accident) (H) 09/2017    septic emboli - MSSA - cerebellum, Left MCA, Rt Occipital, Aphasia, Left visual field cut, moderate to severe encephalopathy     Depression 09/2017     Heart murmur      Hyperlipidemia LDL goal <70 10/31/2010     Hypertension goal BP (blood pressure) < 140/90      Inguinal hernia      left lung nodule  1/29/2008     Major depressive disorder, single episode, moderate (H)      Psoriasis childhood     Tobacco use disorder        Past Surgical History:   Procedure Laterality Date     ARTHROSCOPY KNEE INCISION AND DRAINAGE Left 10/8/2017    Procedure: ARTHROSCOPY KNEE INCISION AND DRAINAGE;  Arthroscopic Incision and Drainage of Left Knee;  Surgeon: Lucretia Munoz MD;  Location: [facility]      SHOULDER ARTHROSCOPY, DX  2001    Arthroscopy, Shoulder RT Dr OSIRIS Andersen      SHOULDER ARTHROSCOPY, DX  1/04    LT arthroscopy Dr OSIRIS Andersen     HERNIA REPAIR, UMBILICAL  1/08    incarcerated - dr rockwell     HERNIORRHAPHY INGUINAL  12/21/2012    HERNIORRHAPHY INGUINAL;  Right Inguinal Hernia Repair with mesh ;  Surgeon: Mello Rockwell MD;  Location:  OR     REPAIR ANEURYSM ASCENDING AORTA N/A 12/19/2017    Procedure: REPAIR ANEURYSM ASCENDING AORTA;  REDO Median STERNOTOMY, FEMORAL CANNULATION, Lysis of adhesions, AORTIC ROOT VALVE HOMOGRAFT with 26mm x 7.0cm Cryolife Aortic valve and conduit;  Surgeon: Pili Bowers MD;  Location: [facility] OR     " REPLACE VALVE AORTIC N/A 5/17/2016    REPLACE VALVE AORTIC - Dr Bowers     ROTATOR CUFF REPAIR RT/LT  11/10    Rt arthroscopy - Dr OSIRIS Andersen     SURGICAL HISTORY OF -   5/16    AVR, severe AR, aortic root repair     TRACHEOSTOMY PERCUTANEOUS  10/27/2017            Medications:[BB1.1]   Current Facility-Administered Medications   Medication     fluconazole (DIFLUCAN) suspension 300 mg     bumetanide (BUMEX) tablet 1 mg     potassium chloride (KAYCIEL) solution 20 mEq     acetylcysteine (MUCOMYST) 10 % injection 4 mL     albuterol neb solution 2.5 mg     LORazepam (ATIVAN) tablet 0.5 mg     carvedilol (COREG) tablet 3.125 mg     protein modular (PROSource TF) 1 packet     famotidine (PEPCID) tablet 20 mg     OLANZapine (zyPREXA) tablet 5 mg     nafcillin IV 2 g vial to attach to  ml bag     ceFEPIme (MAXIPIME) intermittent infusion 2 g     losartan (COZAAR) tablet 25 mg     HYDROmorphone (PF) (DILAUDID) injection 0.2 mg     oxyCODONE IR (ROXICODONE) tablet 5 mg     polyethylene glycol (MIRALAX/GLYCOLAX) Packet 17 g     heparin sodium PF injection 5,000 Units     dextrose 10 % 1,000 mL infusion     multivitamins with minerals (CERTAVITE/CEROVITE) liquid 15 mL     Carboxymethylcellulose Sod PF (REFRESH PLUS) 0.5 % ophthalmic solution 1 drop     naloxone (NARCAN) injection 0.1-0.4 mg     dextrose 10 % 1,000 mL infusion     glucose 40 % gel 15-30 g    Or     dextrose 50 % injection 25-50 mL    Or     glucagon injection 1 mg     lidocaine 1 % 1 mL     lidocaine (LMX4) kit     sodium chloride (PF) 0.9% PF flush 3 mL     sodium chloride (PF) 0.9% PF flush 3 mL     Reason beta blocker order not selected     hydrALAZINE (APRESOLINE) injection 10 mg     acetaminophen (TYLENOL) tablet 650 mg     ondansetron (ZOFRAN-ODT) ODT tab 4 mg    Or     ondansetron (ZOFRAN) injection 4 mg     senna-docusate (SENOKOT-S;PERICOLACE) 8.6-50 MG per tablet 1-2 tablet     temazepam (RESTORIL) capsule 15 mg     acetaminophen (TYLENOL)  solution 650 mg     potassium chloride SA (K-DUR/KLOR-CON M) CR tablet 20-40 mEq     potassium chloride (KLOR-CON) Packet 20-40 mEq     potassium chloride 10 mEq in 100 mL sterile water intermittent infusion (premix)     potassium chloride 10 mEq in 100 mL intermittent infusion with 10 mg lidocaine     potassium chloride 20 mEq in 50 mL intermittent infusion     magnesium sulfate 2 g in NS intermittent infusion (PharMEDium or FV Cmpd)     magnesium sulfate 4 g in 100 mL sterile water (premade)     potassium phosphate 10 mmol in D5W 250 mL intermittent infusion     potassium phosphate 15 mmol in D5W 250 mL intermittent infusion     potassium phosphate 20 mmol in D5W 500 mL intermittent infusion     potassium phosphate 20 mmol in D5W 250 mL intermittent infusion     potassium phosphate 25 mmol in D5W 500 mL intermittent infusion     mirtazapine (REMERON) tablet 30 mg     rifampin (REIFADEN) suspension 300 mg     ketotifen (ZADITOR/REFRESH ANTI-ITCH) 0.025 % ophthalmic solution 1 drop     artificial tears ophthalmic ointment     aspirin chewable tablet 81 mg     atorvastatin (LIPITOR) tablet 40 mg     Continuing beta blocker from home medication list OR beta blocker order already placed during this visit[BB1.6]   .    Allergies:[BB1.1]   Allergies   Allergen Reactions     Lisinopril Swelling     One-sided facial swelling after being on lisinopril/HCTZ for one week.[BB1.6]    .    Family History:[BB1.1]   Family History   Problem Relation Age of Onset     Genetic Disorder Mother      Bone plateover growth Agustin. shoulder surgeries     CANCER Mother      brain cancer     Alzheimer Disease Father[BB1.6]    .    Social History:[BB1.1]   Social History   Substance Use Topics     Smoking status: Former Smoker     Packs/day: 1.00     Years: 35.00     Quit date: 4/1/2016     Smokeless tobacco: Never Used      Comment: 4/2016     Alcohol use 0.0 oz/week     0 Standard drinks or equivalent per week      Comment: 1 drink per week  avg, seldom[BB1.6]   .    Tobacco use: Former smoker, last smoked 2016    ROS:  The 10 point Review of Systems is negative other than noted in the HPI or here.    PHYSICAL EXAMINATION[BB1.1]  Vitals:    12/30/17 2230 12/31/17 0354 12/31/17 0500 12/31/17 0733   BP: 124/85 110/80  137/82   BP Location:    Left arm   Pulse:       Resp: 16 16  16   Temp: 97  F (36.1  C)   98.8  F (37.1  C)   TempSrc: Axillary   Oral   SpO2: 97% 97%  94%   Weight:   73.9 kg (163 lb)[BB1.7]        General:[BB1.1]  patient lying in bed, communicating with dysarthric speech but responding to the questions asked of him[BB1.5]  HEENT:[BB1.1]  trach scar present, left eye injected, oral mucosa dry mouth open at rest[BB1.5]  Cardio:[BB1.1]  systolic murmur heard, diastolic murmur heard[BB1.5]  Pulmonary:[BB1.1]  no respiratory distress[BB1.5]  Abdomen:[BB1.1]  soft, non-tender[BB1.5]  Extremities:[BB1.1]  peripheral pulses palpable[BB1.5]  Skin:[BB1.1]  intact[BB1.5]     Neurologic  Mental Status:[BB1.1]  fully alert, attentive but not fully oriented to time or place ([place] stated as place, time Dec 2018), follows commands, speech dysarthric and difficult to interpret[BB1.5]  Cranial Nerves:[BB1.1]  visual field cut left hemiquadrant, pupils reactive to light not completely symmetric 4 and 3mm, EOMI with normal pursuit, facial sensation intact and symmetric, facial movements asymmetric with profound left sided weakness, hearing not formally tested but intact to conversation, palate elevation symmetric and uvula midline, severe dysarthria, shoulder shrug weak on right, tongue protrusion midline[BB1.5]  Motor:[BB1.1]  Ataxia on left hand (patient is left handed), spasticity in RUE, claw hand beginning to form, normal muscle bulk, no pronator drift in left extremities, finger tapping discordant on left, is able to move his RUE but not to command consistently,[BB1.5]   Sensory:[BB1.1]  Difficult to assess reliably due to aphasia and command  "following ability but patient does appear to have loss of sensation at least on RUE to pinprick and soft touch[BB1.5]  Coordination:[BB1.1]  FNF dysmetric on LUE, LLE without gross abnormalities[BB1.5]  Station/Gait:[BB1.1]  unable to test[BB1.5]    Labs  Labs and Imaging reviewed and used in developing the plan; pertinent results included.     Lab Results   Component Value Date     (H) 12/31/2017       The patient was discussed with the Fellow, Dr. Archuleta.  The staff is Dr. Johnson.    Javier Ace MD   Pager: [phone][BB1.1]     Revision History        User Key Date/Time User Provider Type Action    > BB1.5 12/31/2017  1:58 PM Javier Ace MD Resident Sign     BB1.4 12/31/2017  1:41 PM Javier Ace MD Resident      BB1.3 12/31/2017  8:38 AM Javier Ace MD Resident      BB1.7 12/31/2017  8:30 AM Javier Ace MD Resident      BB1.6 12/31/2017  8:29 AM Javier Ace MD Resident      BB1.2 12/31/2017  8:24 AM Javier Ace MD Resident      BB1.1 12/31/2017  8:22 AM Javier Ace MD Resident             Consults signed by Jamey Thakkar MD at 12/31/2017  4:23 PM      Author:  Jamey Thakkar MD Service:  Psychiatry Author Type:  Physician    Filed:  12/31/2017  4:23 PM Date of Service:  12/26/2017  4:15 PM Creation Time:  12/26/2017  4:47 PM    Status:  Signed :  Jamey Thakkar MD (Physician)         DATE OF SERVICE:  12/26/2017      IDENTIFICATION:  Mr. Cricket Miguel is a 62-year-old white male who is status post CVA.  He has had at least 3 weeks of delirium and I am asked to adjust his antipsychotic medications by Sherif Unger PA-C.       Prior to interviewing this patient, I had an opportunity to review my initial consultation completed on 12/21/2017.  The patient has been started on Zyprexa 5 b.i.d. as well as 5 p.r.n.  The concern now is that the patient is \"too flat.\" Prior to seeing him, I ran into his family in " "the hallway and they felt that he was oversedated.  During my interview, the patient's daughter was feeding him jello and he seemed relatively content, but he reported that something was wrong with his thinking, which was clearly the case.  He was completely disoriented, did not know where we were or what the month was.  Family felt that he had been oversedated, so I suggested we could decrease his morning olanzapine to 2.5.  I am somewhat concerned about his potential for becoming agitated and aggressive.  However, family was very interested in decreasing his antipsychotic load.  When discussing this with his daughter, she reports that he is not actually violent.  He just \"occasionally hits.\"  I am hopeful that by decreasing his Zyprexa, we will not increase his occasions to hit but will help him to have a little more energy to participate in cares.        MENTAL STATUS EXAMINATION:  On my interview, the patient was quite pleasant and cooperative.  His mood seemed pretty good as he was with his daughter and she was feeding  him cubes of jello.  He seemed quite content with that situation.  His affect did not seem flat to me, but again this was probably because of the presence of his daughter.  On my interview, his affect was actually full.  His speech was coherent, not always goal oriented.  His associations were not always tight, nor were his thought processes logical and linear.  His content of thought was without overt psychosis or suicidal ideation.  Recent and remote memory, concentration and fund of knowledge were impaired.  Use of language was generally within normal limits.  He was alert but only oriented to self.  Insight and judgment are very guarded.  Muscle strength and tone are decreased post-CVA.  Recent vital signs include a temperature of 98.6, pulse of 118, respiration rate of 20 with 97% oxygen saturation and a blood pressure of 103/83.      ASSESSMENT:  Delirium likely secondary to infection and " cerebrovascular accident.      RECOMMENDATION:  Please decrease morning Zyprexa to 2.5 mg.  There is already a 5 mg p.r.n. available should it become necessary.         JAMEY THAKKAR MD             D: 2017 16:15   T: 2017 16:46   MT: DK      Name:     ASHTYN STROUD   MRN:      [id number]        Account:       [id number]   :      1955           Consult Date:  2017      Document: [id number][WM1.1]         Revision History        User Key Date/Time User Provider Type Action    > WM1.1 2017  4:23 PM Jamey Thakkar MD Physician Sign            Consults by Jamey Thakkar MD at 2017  4:17 PM     Author:  Jamey Thakkar MD Service:  Psychiatry Author Type:  Physician    Filed:  2017  4:17 PM Date of Service:  2017  4:17 PM Creation Time:  2017  4:16 PM    Status:  Signed :  Jamey Thakkar MD (Physician)     Consult Orders:    1. Psychiatry IP Consult: Assist with management of antipsychotics, more flat since starting zyprexa; Consultant may enter orders: Yes; Patient to be seen: Routine; Call back #: [facility] [id number] [[id number]] ordered by Benny Unger PA-C at 17 1532                Patient seen and chart reviewed. Formal consult dictated.(F/U)    Jamey Thakkar MD[WM1.1]     Revision History        User Key Date/Time User Provider Type Action    > WM1.1 2017  4:17 PM Jamey Thakkar MD Physician Sign            Consults signed by Daniela Forde, PhD at 2017  3:14 PM      Author:  Daniela Forde, PhD Service:  Health Psychology Author Type:  Psychologist    Filed:  2017  3:14 PM Date of Service:  2017  2:19 PM Creation Time:  2017  9:22 PM    Status:  Signed :  Daniela Forde, PhD (Psychologist)     Consult Orders:    1. Psychology Adult IP Consult [[id number]] ordered by Emanuel Anderson MD at 17 1237                  Health Psychology                   "[facility]    Department of Medicine  Carolina Johnson, PhD, LP [phone]                          Clinics and Surgery Center  [facility] Carl Aragon, PhD, LP [phone]                  3rd Floor  [place] Mail [address]   Jamey Spence, PhD, ABPP, LP [phone]     [address] [facility],   [address] [facility],  Daniela Forde,  PhD, LP [phone]            [place] [address] Poornima Alaniz, PhD, LP [phone]     Inpatient Health Psychology Consultation    DATE OF SERVICE:  12/22/2017       REFERRAL:  Emanuel Anderson MD      REASON FOR REFERRAL:  Therapy recommended by Psychiatry who requested therapy by family.      SOURCES OF INFORMATION:  Information was obtained from review of medical record, clinical interview with patient and daughter.      HISTORY OF PRESENTING CONCERN:  Cricket Miguel is a 62-year-old male with history of COPD, hypertension, hyperlipidemia, CAD, aortic stenosis and ascending aortic aneurysm, status post aortic valve replacement and aortic root replacement (04/2016) complicated by periaortic abscess, endocarditis, cerebral septic emboli and severe AI status post redo sternotomy, aortic root and valve monograft on 12/19/2017.  Per review of medical record, it appears he has been experiencing delirium for approximately 1 month.  Consultation with nurse today suggested that he had fluctuating mental status, with agitation requiring 1:1 sitter, although he was able to somewhat participate in meaningful conversation.  Per review of medical record, he has been seen by Psychiatry at least 2 times, with a diagnosis of delirium likely secondary to infection.  The patient's daughter was present during this interview, with her stating that she very much welcomed Health Psychology intervention due to her father's anger and frustration with his deterioration in health.  The patient reported that he is angry, also stating that this situation\" sucks.\" " " He was able to articulate that he was feeling angry and frustrated, but participation in meaningful conversation beyond this was difficult.  He was not able to articulate any meaningful coping strategies.  When prompted to discuss strategies such as guided imagery, he stated he has a peaceful place that he usually uses for coping when feeling overwhelmed or stressed.  Per review of medical record, it appears that collateral information obtained by Psychiatry from his girlfriend and 2 daughters have suggested that on and off since his stroke he has been quite agitated and to have temper tantrums for \"5-6 hours at a time.\"      PAST MEDICAL HISTORY:  See below list for current medical problems, past surgical history, and current medications.  Notably, he suffered a CVA due to septic emboli in early October, with participation in rehabilitation and re-hospitalization since that time.[MP1.1]     Current Facility-Administered Medications   Medication     famotidine (PEPCID) suspension 20 mg     ceFEPIme (MAXIPIME) intermittent infusion 2 g     OLANZapine (zyPREXA) tablet 7.5 mg     HYDROmorphone (PF) (DILAUDID) injection 0.2 mg     oxyCODONE IR (ROXICODONE) tablet 5 mg     polyethylene glycol (MIRALAX/GLYCOLAX) Packet 17 g     OLANZapine (zyPREXA) tablet 5 mg     heparin sodium PF injection 5,000 Units     dextrose 10 % 1,000 mL infusion     multivitamins with minerals (CERTAVITE/CEROVITE) liquid 15 mL     Carboxymethylcellulose Sod PF (REFRESH PLUS) 0.5 % ophthalmic solution 1 drop     naloxone (NARCAN) injection 0.1-0.4 mg     naloxone (NARCAN) injection 0.1-0.4 mg     dextrose 10 % 1,000 mL infusion     insulin 1 unit/mL in saline (NovoLIN, HumuLIN Regular) drip - ADULT IV Infusion     glucose 40 % gel 15-30 g    Or     dextrose 50 % injection 25-50 mL    Or     glucagon injection 1 mg     lidocaine 1 % 1 mL     lidocaine (LMX4) kit     sodium chloride (PF) 0.9% PF flush 3 mL     sodium chloride (PF) 0.9% PF flush 3 " mL     Reason beta blocker order not selected     dextrose 5% and 0.45% NaCl + KCl 20 mEq/L infusion     hydrALAZINE (APRESOLINE) injection 10 mg     insulin aspart (NovoLOG) inj (RAPID ACTING)     insulin aspart (NovoLOG) inj (RAPID ACTING)     acetaminophen (TYLENOL) tablet 650 mg     ondansetron (ZOFRAN-ODT) ODT tab 4 mg    Or     ondansetron (ZOFRAN) injection 4 mg     senna-docusate (SENOKOT-S;PERICOLACE) 8.6-50 MG per tablet 1-2 tablet     temazepam (RESTORIL) capsule 15 mg     acetaminophen (TYLENOL) solution 650 mg     mupirocin (BACTROBAN) 2 % ointment 0.5 g     EPINEPHrine (ADRENALIN) 5 mg in NaCl 0.9 % 250 mL infusion     potassium chloride SA (K-DUR/KLOR-CON M) CR tablet 20-40 mEq     potassium chloride (KLOR-CON) Packet 20-40 mEq     potassium chloride 10 mEq in 100 mL sterile water intermittent infusion (premix)     potassium chloride 10 mEq in 100 mL intermittent infusion with 10 mg lidocaine     potassium chloride 20 mEq in 50 mL intermittent infusion     magnesium sulfate 2 g in NS intermittent infusion (PharMEDium or FV Cmpd)     magnesium sulfate 4 g in 100 mL sterile water (premade)     potassium phosphate 10 mmol in D5W 250 mL intermittent infusion     potassium phosphate 15 mmol in D5W 250 mL intermittent infusion     potassium phosphate 20 mmol in D5W 500 mL intermittent infusion     potassium phosphate 20 mmol in D5W 250 mL intermittent infusion     potassium phosphate 25 mmol in D5W 500 mL intermittent infusion     meperidine (DEMEROL) injection 12.5-25 mg     acetylcysteine (MUCOMYST) 10 % injection 2 mL     dexmedetomidine (PRECEDEX) 400 mcg in 0.9% sodium chloride 100 mL     mirtazapine (REMERON) tablet 30 mg     rifampin (REIFADEN) suspension 300 mg     ketotifen (ZADITOR/REFRESH ANTI-ITCH) 0.025 % ophthalmic solution 1 drop     artificial tears ophthalmic ointment     aspirin chewable tablet 81 mg     atorvastatin (LIPITOR) tablet 40 mg     Continuing beta blocker from home medication  list OR beta blocker order already placed during this visit     Past Medical History:   Diagnosis Date     Abscess of aortic root 09/2017     AI (aortic insufficiency)     severe     Anxiety      Aortic root dilatation (H)      AR (aortic regurgitation)     severe     Cardiomyopathy (H)      CHF (congestive heart failure), NYHA class II (H)      COPD, mild (H)     spirometry 12/16     CVA (cerebral vascular accident) (H) 09/2017    septic emboli - MSSA - cerebellum, Left MCA, Rt Occipital, Aphasia, Left visual field cut, moderate to severe encephalopathy     Depression 09/2017     Heart murmur      Hyperlipidemia LDL goal <70 10/31/2010     Hypertension goal BP (blood pressure) < 140/90      Inguinal hernia      left lung nodule  1/29/2008     Major depressive disorder, single episode, moderate (H)      Psoriasis childhood     Tobacco use disorder      Past Surgical History:   Procedure Laterality Date     ARTHROSCOPY KNEE INCISION AND DRAINAGE Left 10/8/2017    Procedure: ARTHROSCOPY KNEE INCISION AND DRAINAGE;  Arthroscopic Incision and Drainage of Left Knee;  Surgeon: Lucretia Munoz MD;  Location: [facility] OR     HC SHOULDER ARTHROSCOPY, DX  2001    Arthroscopy, Shoulder RT Dr OSIRIS Andersen     HC SHOULDER ARTHROSCOPY, DX  1/04    LT arthroscopy Dr OSIRIS Andersen     HERNIA REPAIR, UMBILICAL  1/08    incarcerated - dr rockwell     HERNIORRHAPHY INGUINAL  12/21/2012    HERNIORRHAPHY INGUINAL;  Right Inguinal Hernia Repair with mesh ;  Surgeon: Mello Rockwell MD;  Location: [facility] OR     REPAIR ANEURYSM ASCENDING AORTA N/A 12/19/2017    Procedure: REPAIR ANEURYSM ASCENDING AORTA;  REDO Median STERNOTOMY, FEMORAL CANNULATION, Lysis of adhesions, AORTIC ROOT VALVE HOMOGRAFT with 26mm x 7.0cm Cryolife Aortic valve and conduit;  Surgeon: Pili Bowers MD;  Location: [facility] OR     REPLACE VALVE AORTIC N/A 5/17/2016    REPLACE VALVE AORTIC - Dr Bowers     ROTATOR CUFF REPAIR RT/LT  11/10    Rt arthroscopy - Dr OSIRIS Andersen      SURGICAL HISTORY OF -       AVR, severe AR, aortic root repair     TRACHEOSTOMY PERCUTANEOUS  10/27/2017[MP1.2]             PSYCHIATRIC HISTORY:  It appears that he has some previous history of depression.  It appears that his wife  approximately 9 years ago with him receiving antidepressant treatment following this.  He also appears to have a recent history of anxiety and received 25 mg of Ativan which apparently helped symptoms.  Per review of medical record, he has also been treated with Seroquel on a regular basis, which provides moderate help.  It does not appear he has a history of psychiatric hospitalizations, and no chemical dependency history or chemical dependency treatment.  Per medical record, he is a regular cannabis smoker but very light drinker.  He quit smoking cigarettes last year.  Psychiatric medications are reported to include Zyprexa 5 mg b.i.d. plus 5 mg p.r.n. agitation, Effexor, Remeron and Ativan p.r.n.      SOCIAL HISTORY:  Documented in the medical record, see EMR for details. He reported good support with his girlfriend and children.      MENTAL STATUS EXAMINATION:  The patient was alert and sitting in a chair in his hospital room upon my arrival.  He was responsive to voice, orientation was not assessed at this time, with orientation waxing and waning per review of medical record in recent days.  He was somewhat agitated, with no abnormal motor movements, but appeared easily angered when asked about coping strategies.  He also had tangential thought processes, transitioning easily into a lengthy statement about his satisfaction with the current president.  Speech was difficult to understand.  Mood appears frustrated and affect was agitated.  Thought content was negative for suicidal ideation, plan, or intent.  It appears that he has denied suicidal ideation in the past.      IMPRESSION:  Cricket Miguel is a 62-year-old male with complex medical history currently experiencing  delirium, with episodes of waxing and waning cognitive status, who appears to be the most appropriate for medication management at this time.  However, per review of medical record and per report by his daughter, he has experienced difficulty adjusting following stroke with feelings of anger and sadness.  It appears that he may be a good candidate for Health Psychology Services following resolution of delirium.  Will continue to follow patient approximately once per week during hospitalization to continue monitoring mental status and provide problem focused interventions when appropriate.      DIAGNOSIS:  Delirium, likely secondary to infection; adjustment disorder with mixed anxiety and depressed mood.      RECOMMENDATIONS AND PLAN:  Discussed use of guided imagery to increase his ability to manage symptoms of agitation and anger.  However, it appears he is not able to integrate these at this time.  He appears most appropriate for psychiatric medication management currently and we will continue to monitor his appropriateness for psychotherapy.  Will continue to follow once per week during his hospital stay.      Daniela Delatorre, PhD, LP  Clinical Health Psychologist  Pager: [phone]       DANIELA DELATORRE PHD, LP             D: 2017 14:19   T: 2017 21:22   MT:       Name:     ASHTYN STROUD   MRN:      [id number]        Account:       [id number]   :      1955           Consult Date:  2017      Document: [id number][MP1.1]         Revision History        User Key Date/Time User Provider Type Action    > MP1.1 2017  3:14 PM Daniela Delatorre, PhD Psychologist Sign     MP1.2 2017  3:12 PM Daniela Delatorre, PhD Psychologist      [N/A] 2017  9:22 PM Daniela Delatorre PhD Psychologist Edit            Consults signed by Jamey Thakkar MD at 2017  5:15 PM      Author:  Jamey Thakkar MD Service:  Psychiatry Author Type:  Physician    Filed:  2017   5:15 PM Date of Service:  12/21/2017  2:33 PM Creation Time:  12/21/2017 10:24 PM    Status:  Signed :  Jamey Thakkar MD (Physician)         DATE OF CONSULTATION:  12/21/2017      IDENTIFICATION:  Mr. Cricket Miguel is a 62-year-old white male who is status post  very significant cardiac issues including aortic valve replacement with a septic embolus to the brain as well as aortic leak with repair.  I am asked to evaluate his delirium by Dr. Bowers.      HISTORY OF PRESENT ILLNESS:  Prior to interviewing this patient, I had an opportunity to review the initial consultation completed by Dr. Mello Babin on 12/18/2017.  Today the patient's mental status is significantly worse than it was during Dr. Babin's interview; however, I am told that his mental status waxes and wanes.  He has been consistently delirious for over a month and was being treated for delirium when he was a patient at [facility] prior to this hospitalization.      In interviewing the patient, I learned very little because he is completely disoriented.  He is in a Hattie chair with soft restraints and makes no sense.  He is very tangential.  There is no logic or linearity to his thought process.  Happily his girlfriend who has been with him this entire time was in the room and eager to help.  She reports he has been continuously delirious, though every now and again he will have an episode of lucidity.  She reports that he was treated with IV Haldol and developed some tremors and perhaps extrapyramidal side effects at [facility].  More recently he has been treated with Seroquel.  He has actually been on Seroquel since [facility] and that has had very little benefit.  The concern has always been his elevated QTc, but happily this has normalized, so today we have more options for treating delirium.      MENTAL STATUS EXAMINATION:  On my interview, the patient was completely disoriented and talking nonsense.  His mood was difficult to assess.  His  affect was labile.  His speech was sometimes incoherent but often incoherent.  It was not goal oriented.  Thought processes were not logical and linear, and he was quite tangential.  Content of thought was without overt hallucinations or suicidal ideation.  Recent and remote memory, concentration and fund of knowledge were all quite impaired.  Use of language was impaired but closer to normal.  He was alert but totally disoriented.  Insight and judgment are very poor.  Muscle strength and tone are very difficult to assess as he is in restraints.        Recent vital signs include a temperature of 99.1, heart rate of 81, respiration rate of 16, blood pressure 82/58 and oxygen saturation 100%.      I had an opportunity to discuss this case with the treatment team.  The belief is that he continues to have infection that is the likely cause of his delirium.  They are attempting to treat this aggressively.      ASSESSMENT:  Delirium, likely secondary to infection.      RECOMMENDATION:   1.  Discontinue Seroquel.   2.  Zyprexa 5 mg b.i.d. plus 5 mg p.r.n. agitation.  The Zyprexa can be titrated appropriately to a total dose of 30 mg in 24 hours.  During any increase of Zyprexa, we must be mindful of his QTc.  If 30 mg of Zyprexa is not helpful, we may have to return to intravenous Haldol.  This would likely require intravenous anticholinergics as well.  For now, I would like to try simply using Zyprexa.         SUZY THAKKAR MD             D: 2017 14:33   T: 2017 22:23   MT: mg      Name:     ASHTYN STROUD   MRN:      [id number]        Account:       [id number]   :      1955           Consult Date:  2017      Document: [id number][WM1.1]         Revision History        User Key Date/Time User Provider Type Action    > WM1.1 2017  5:15 PM Suzy Thakkar MD Physician Sign     [N/A] 2017 10:24 PM Suzy Thakkar MD Physician Edit            Consults by Pavan  Shakila Elizondo MD at 12/21/2017  5:06 PM     Author:  Shakila Browne MD Service:  Cardiology Author Type:  Physician    Filed:  12/21/2017 10:17 PM Date of Service:  12/21/2017  5:06 PM Creation Time:  12/21/2017  5:05 PM    Status:  Signed :  Shakila Browne MD (Physician)     Consult Orders:    1. Cardiology Heart Failure (HF) IP Consult: Patient to be seen: Routine within 24 hrs; Call back #: [id number]; heart failure acute on chronic; Consultant may enter orders: Yes [[id number]] ordered by Rea Boyce MD at 12/21/17 1115                     Cardiology Inpatient Consultation  December 21, 2017    Reason for Consult:  A cardiology consult was requested by Dr. Alford from the CV ICU service to provide clinical guidance regarding new onset reduced EF.    HPI:   Cricket Miguel is a 62 year old male admitted with worsening dyspnea, thought to have acute severe AI on echocardiogram.  Pertinent history of aortic stenosis and ascending aortic aneurysm s/p AVR and root replacement April 2016.  Had complications of moises aortic abscess, endocarditis, and septic emboli to his brain.  He underwent redo sternotomy with aortic root and valve homograft 12/19.  Heart failure service is being consulted for newly reduced EF 20-25%.    Patient received 26 mm x 7 cm cryolife aortic valve and conduit with Dr. Bowers 12/19.  Prior valve was trifecta valve with 30mm graft.  No complications.  Currently on Epinephrine 0.01.  His prior to admission HCTZ, labetalol.      Every prior echo shows EF 55-65 until 12/17 when EF was 30-35%.  Echo post procedure 12/20 EF biplane traced at 23%.      Patient is extubated.  Does have swan in.  Has been agitated and delirious since extubation.      Discussed with wife and patient.  Currently patient has no physical complaints and states he wants to leave but he is also delirious.    PMH:[VM1.1]  Past Medical History:   Diagnosis Date     Abscess of aortic root 09/2017     AI  (aortic insufficiency)     severe     Anxiety      Aortic root dilatation (H)      AR (aortic regurgitation)     severe     Cardiomyopathy (H)      CHF (congestive heart failure), NYHA class II (H)      COPD, mild (H)     spirometry 12/16     CVA (cerebral vascular accident) (H) 09/2017    septic emboli - MSSA - cerebellum, Left MCA, Rt Occipital, Aphasia, Left visual field cut, moderate to severe encephalopathy     Depression 09/2017     Heart murmur      Hyperlipidemia LDL goal <70 10/31/2010     Hypertension goal BP (blood pressure) < 140/90      Inguinal hernia      left lung nodule  1/29/2008     Major depressive disorder, single episode, moderate (H)      Psoriasis childhood     Tobacco use disorder[VM1.2]      Active Problems:[VM1.1]  Patient Active Problem List    Diagnosis Date Noted     Hypertension goal BP (blood pressure) < 140/90 01/29/2008     Priority: High     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 12/19/2017     Priority: Medium     Heart failure (H) 12/16/2017     Priority: Medium     Anxiety      Priority: Medium     Encephalopathy 10/17/2017     Priority: Medium     Endocarditis 10/06/2017     Priority: Medium     Abscess of aortic root 09/01/2017     Priority: Medium     CVA (cerebral vascular accident) (H) 09/01/2017     Priority: Medium     septic emboli       Depression 09/01/2017     Priority: Medium     S/P AVR (aortic valve replacement) 05/23/2016     Priority: Medium     H/O aortic root repair 05/23/2016     Priority: Medium     Anemia 05/23/2016     Priority: Medium     Cardiomyopathy (H)      Priority: Medium     Status post coronary angiogram 05/06/2016     Priority: Medium     Health Care Home 05/02/2016     Priority: Medium       Status:  Accepted  Care Coordinator:  Jane Trivedi    See Letters for [facility] Care Plan  Date:  May 2, 2016           COPD, mild (H)      Priority: Medium     CHF (congestive heart failure), NYHA class II (H)      Priority:  Medium     AR (aortic regurgitation)      Priority: Medium     AI (aortic insufficiency)      Priority: Medium     Aortic root dilatation (H)      Priority: Medium     Hyperlipidemia LDL goal <70 10/31/2010     Priority: Medium     Major depressive disorder, single episode, moderate (HCC)      Priority: Medium     Disease of lung 01/29/2008     Priority: Medium     Problem list name updated by automated process. Provider to review       Tobacco use disorder 05/25/2006     Priority: Medium     Psoriasis      Priority: Low     Advance Care Planning 02/07/2012     Priority: Low     Advance Care Planning 5/24/2016: ACP Review of Chart / Resources Provided:  Reviewed chart for advance care plan.  Cricket Miguel has an advance care plan on file which needs to be updated. Only code status on file. Suggest information and resources for ACP be provided at next opportunity.  Added by Lori Colbert RN, System Director ACP-Honoring Choices   Advance Care Planning 2/16/12 Left message for patient to call back to arrange facilitation if desired for completion of Advanced Care Directive. Kristal Reaves LPN/Advanced Healthcare Planning Facilitator  Advance Care Planning 2/7/12 Discussed advance care planning with patient; information given to patient to review. Sherif Sparrow CMA[VM1.2]           Social History:[VM1.1]  Social History   Substance Use Topics     Smoking status: Former Smoker     Packs/day: 1.00     Years: 35.00     Quit date: 4/1/2016     Smokeless tobacco: Never Used      Comment: 4/2016     Alcohol use 0.0 oz/week     0 Standard drinks or equivalent per week      Comment: 1 drink per week avg, seldom[VM1.2]     Family History:[VM1.1]  Family History   Problem Relation Age of Onset     Genetic Disorder Mother      Bone plateover growth Agustin. shoulder surgeries     CANCER Mother      brain cancer     Alzheimer Disease Father[VM1.2]      Medications:[VM1.1]    polyethylene glycol  17 g Oral Daily     [START ON  12/22/2017] famotidine  20 mg Intravenous Q24H     OLANZapine  5 mg Oral BID BMT CSA     heparin  5,000 Units Subcutaneous Q8H     venlafaxine  50 mg Oral BID     multivitamins with minerals  15 mL Per Feeding Tube Daily     piperacillin-tazobactam  4.5 g Intravenous Q6H     sodium chloride (PF)  3 mL Intracatheter Q8H     insulin aspart   Subcutaneous TID w/meals     acetaminophen  975 mg Oral Q8H     senna-docusate  1-2 tablet Oral BID     mupirocin  0.5 g Both Nostrils BID     acetylcysteine  2 mL Nebulization Q4H     mirtazapine  30 mg Oral At Bedtime     rifampin  300 mg Oral or Feeding Tube BID     potassium chloride  40 mEq Oral Once     potassium chloride  40 mEq Oral Once     ketotifen  1 drop Left Eye TID     artificial tears   Ophthalmic At Bedtime     aspirin  81 mg Oral or Feeding Tube Daily     atorvastatin  40 mg Oral Daily         norepinephrine Stopped (12/20/17 0805)     IV fluid REPLACEMENT ONLY       IV fluid REPLACEMENT ONLY       insulin (regular) Stopped (12/21/17 0100)     Reason beta blocker order not selected       dextrose 5% and 0.45% NaCl + KCl 20 mEq/L 5 mL/hr at 12/21/17 1200     EPINEPHrine IV infusion ADULT Stopped (12/21/17 1630)     dexmedetomidine 0.5 mcg/kg/hr (12/21/17 1600)     - MEDICATION INSTRUCTIONS -[VM1.2]       Physical Exam:[VM1.1]  Temp:  [99  F (37.2  C)-101  F (38.3  C)] 99.1  F (37.3  C)  Heart Rate:  [61-93] 83  Resp:  [12-32] 16  MAP:  [52 mmHg-109 mmHg] 101 mmHg  Arterial Line BP: ()/(43-86) 151/83  SpO2:  [95 %-100 %] 96 %[VM1.2]    Randolph numbers reviewed    Intake/Output Summary (Last 24 hours) at 12/21/17 1706  Last data filed at 12/21/17 1600   Gross per 24 hour   Intake          1975.41 ml   Output             2935 ml   Net          -959.59 ml     GEN: pleasant, no acute distress  HEENT: no icterus  CV: RRR, normal s1/s2, no murmurs/rubs/s3/s4, no heave. JVP at 8-9 cm.   CHEST: on 3L NC, normal work of breath, clear to ausculation bilaterally, no rales  or wheezing  ABD: soft, non-tender, normal active bowel sounds  EXTR: pulses[VM1.1] 2+ and equal[VM1.3]. No clubbing, cyanosis or edema.   NEURO: alert oriented, speech fluent/appropriate, motor grossly nonfocal    Diagnostics:  All labs and imaging were reviewed    EKG:[VM1.1] low voltage, sinus rhythm[VM1.3]    Transthoracic echocardiogram:[VM1.1]   Left ventricular wall thickness is normal. Mild left ventricular dilation is present. The Ejection Fraction is estimated at 30-35%. Diastolic function not assessed due to tachycardia. Severe diffuse hypokinesis is present.     The right ventricle is normal size. Global right ventricular function is mildly reduced.  Both atria appear normal.  Trace mitral insufficiency is present.  The tricuspid valve is normal.     Vessels  The patient is post bio-Bentall - aortic root replacement with a 30 mm graft  and AVR with a Trifecta valve. Today, there is circumferential free space  noted surrounding the aortic graft, dehiscence and rocking of the graft from  the native aorta and severe AI in the space surrounding the aortic graft  (perivalvular/perigraft). The circumferential free space was present on the  previous studies of October 2017 but was filled with echodense material and  the graft was not independently mobile, and there was no significant AI. The  prosthetic aortic valve leaflets are not well visualized today.     No pericardial effusion is present.     Compared to Previous Study  This study was compared with the study from 10/06/2017 . Since the prior  study, the LV function has worsened and the aortic graft appears to be  dehisced, as described above.[VM1.3]      Assessment and Recommendation:  Cricket Miguel is a 62 year old male admitted with worsening dyspnea, thought to have acute severe AI on echocardiogram.  Pertinent history of aortic stenosis and ascending aortic aneurysm s/p AVR and root replacement April 2016.  Had complications of moises aortic abscess,  endocarditis, and septic emboli to his brain.  He underwent redo sternotomy with aortic root and valve homograft 12/19.  Heart failure service is being consulted for newly reduced EF 20-25%.[VM1.1]    Would check troponin.  Coronaries were altered during graft replacement, want to make sure patient isnt having ischemia.[VM1.4]  Based on swan numbers CO 4.1, CI 2.0.  SVR appears to be around 1300.  PCWP 12 when I wedged him.[VM1.5]  This likely is from the severe AI and recent surgery.  I imagine his EF should improve over time if he is able to maintain euvolemia and be on evidence based therapies.[VM1.4]  I would not start them yet with his resolving shock.  I would hold off further diuresis for now as his exam PCWP indicate he relatively euvolemic, and furthermore his Cr. Is increasing.[VM1.5]      I have discussed the above with [VM1.1] Pavan[VM1.3].    Thank you for consulting the cardiovascular services at the [facility]. Please do not hesitate to call us with any questions.     Pardeep Cruz MD PGY4  Cardiology Fellow  [phone][VM1.1]      I have reviewed today's vital signs, notes, medications, labs and imaging. I have also seen and examined the patient and agree with the findings and plan as outlined above.    Shakila Browne MD  Section Head - Advanced Heart Failure, Transplantation and Mechanical Circulatory Support  Co-Director - [facility]  Associate Professor of Medicine, [facility][CM1.1]       Revision History        User Key Date/Time User Provider Type Action    > CM1.1 12/21/2017 10:17 PM Shakila Browne MD Physician Sign     VM1.5 12/21/2017  6:39 PM Pardeep Cruz MD Resident Sign     VM1.4 12/21/2017  6:19 PM Pardeep Cruz MD Resident      VM1.3 12/21/2017  5:25 PM Pardeep Cruz MD Resident      VM1.2 12/21/2017  5:06 PM Pardeep Cruz MD Resident      VM1.1 12/21/2017  5:05  PM Pardeep Cruz MD Resident             Consults by Jamey Thakkar MD at 12/21/2017  2:35 PM     Author:  Jamey Thakkar MD Service:  Psychiatry Author Type:  Physician    Filed:  12/21/2017  2:35 PM Date of Service:  12/21/2017  2:35 PM Creation Time:  12/21/2017  2:34 PM    Status:  Signed :  Jamey Thakkar MD (Physician)     Consult Orders:    1. Psychiatry IP Consult: delirium, anxiety; Consultant may enter orders: Yes; Patient to be seen: ASAP; Call back #: [phone] [[id number]] ordered by Rea Boyce MD at 12/21/17 1229                Patient seen and chart reviewed. Formal consult dictated.(F/U)    Jamey Thakkar MD[WM1.1]         Revision History        User Key Date/Time User Provider Type Action    > WM1.1 12/21/2017  2:35 PM Jamey Thakkar MD Physician Sign            Consults by Yudith Lee APRN CNP at 12/20/2017  9:15 PM     Author:  Yudith Lee APRN CNP Service:  Electrophysiology Author Type:  Nurse Practitioner    Filed:  12/20/2017  9:15 PM Date of Service:  12/20/2017  9:15 PM Creation Time:  12/20/2017  9:15 PM    Status:  Signed :  Yudith Lee APRN CNP (Nurse Practitioner)     Consult Orders:    1. Cardiology Electrophysiology (EP) IP Consult: Patient to be seen: Routine within 24 hrs; Call back #: [phone]; high degree AV block s/p aortic root repair. HD instability; Consultant may enter orders: Yes [[id number]] ordered by Rea Boyce MD at 12/20/17 0752                Please see Dr. Rojas's consult note from today.   BIBI Snell CNP  Electrophysiology Consult Service  Pager: [phone][LV1.1]       Revision History        User Key Date/Time User Provider Type Action    > LV1.1 12/20/2017  9:15 PM Yudith Lee APRN CNP Nurse Practitioner Sign            Consults by Duy Rojas MD at 12/20/2017  9:11 AM     Author:  Duy Rojas MD Service:  Cardiology Author Type:  Physician     Filed:  12/20/2017  5:00 PM Date of Service:  12/20/2017  9:11 AM Creation Time:  12/20/2017  9:10 AM    Status:  Signed :  Duy Rojas MD (Physician)     Consult Orders:    1. Cardiology Electrophysiology (EP) IP Consult: Patient to be seen: Routine within 24 hrs; Call back #: [phone]; AV dissociation, accelerated junctional rhythm, s/p aortic root homograft; Consultant may enter orders: Yes [[id number]] ordered by Benny Unger PA-C at 12/20/17 0752                  Electrophysiology Consultation Note   EP Attending:   Reason for consultation:  AV dissociation, accelerated junctional rhythm, s/p aortic root homograft  Provider requesting consultation: Dr. Bowers  Date of Service: 12/20/2017      HPI: Cricket Miguel is a 62 year old male with hx of COPD, HTN, HLD, CAD, HFpEF, aortic stenosis and ascending aortic aneurism s/p aortic valve replacement and aortic root replacement (April 2016) c/b moises-aortic abscess, endocarditis, cerebral septic emboli and severe AI s/p redo-sternotomy aortic root and valve homograft 12/19.[MS1.1]     EP was consulted for AV dissociation and accelerated junctional rhythm.   At this point, p[MS1.2]rakesh is currently intubated[MS1.1] and continues on epinephrine and norepinephrine.    Reviewing the telemetry patient's HR is in the 80-90s.[MS1.2]      Current medications include atorvastatin, precedex, epi ratine from 0.1 - 0.07 mcg/kg/min, fentanyl, insulin gtt, lasix 20 mg, norepinephrine off this am  Past Medical History:[MS1.1]   Past Medical History:   Diagnosis Date     Abscess of aortic root 09/2017     AI (aortic insufficiency)     severe     Anxiety      Aortic root dilatation (H)      AR (aortic regurgitation)     severe     Cardiomyopathy (H)      CHF (congestive heart failure), NYHA class II (H)      COPD, mild (H)     spirometry 12/16     CVA (cerebral vascular accident) (H) 09/2017    septic emboli - MSSA - cerebellum, Left MCA, Rt  Occipital, Aphasia, Left visual field cut, moderate to severe encephalopathy     Depression 09/2017     Heart murmur      Hyperlipidemia LDL goal <70 10/31/2010     Hypertension goal BP (blood pressure) < 140/90      Inguinal hernia      left lung nodule  1/29/2008     Major depressive disorder, single episode, moderate (H)      Psoriasis childhood     Tobacco use disorder[MS1.3]      Past Surgical History:[MS1.1]   Past Surgical History:   Procedure Laterality Date     ARTHROSCOPY KNEE INCISION AND DRAINAGE Left 10/8/2017    Procedure: ARTHROSCOPY KNEE INCISION AND DRAINAGE;  Arthroscopic Incision and Drainage of Left Knee;  Surgeon: Lucretia Munoz MD;  Location: [facility] OR     HC SHOULDER ARTHROSCOPY, DX  2001    Arthroscopy, Shoulder RT Dr OSIRIS Andersen     HC SHOULDER ARTHROSCOPY, DX  1/04    LT arthroscopy Dr OSIRIS Andersen     HERNIA REPAIR, UMBILICAL  1/08    incarcerated - dr rockwell     HERNIORRHAPHY INGUINAL  12/21/2012    HERNIORRHAPHY INGUINAL;  Right Inguinal Hernia Repair with mesh ;  Surgeon: Mello Rockwell MD;  Location: [facility] OR     REPLACE VALVE AORTIC N/A 5/17/2016    REPLACE VALVE AORTIC - Dr Bowers     ROTATOR CUFF REPAIR RT/LT  11/10    Rt arthroscopy - Dr OSIRIS Andersen     SURGICAL HISTORY OF -   5/16    AVR, severe AR, aortic root repair     TRACHEOSTOMY PERCUTANEOUS  10/27/2017[MS1.3]          Allergies: Per MAR[MS1.1]     Allergies   Allergen Reactions     Lisinopril Swelling     One-sided facial swelling after being on lisinopril/HCTZ for one week.[MS1.3]      Medications:   Per MAR current outpatient cardiovascular medications include:[MS1.1] Prescriptions Prior to Admission   Medication Sig Dispense Refill Last Dose     ARIPiprazole (ABILIFY) 2 MG tablet Take 3 tablets (6 mg) by mouth daily 30 tablet 0 12/16/2017 at 8 a.m     hydrochlorothiazide (HYDRODIURIL) 25 MG tablet Take 0.5 tablets (12.5 mg) by mouth daily 90 tablet 1 12/16/2017 at 12 a.m     labetalol (NORMODYNE) 100 MG tablet Take 1  tablet (100 mg) by mouth every 8 hours   12/16/2017 at 3 p.m     D5W 1,000 mL with nafcillin 12 g continuous infusion Inject 12 g into the vein every 24 hours   12/16/2017 at 8a.m     famotidine (PEPCID) 40 MG/5ML suspension Take 2.5 mLs (20 mg) by mouth 2 times daily 75 mL  12/17/2017 at Unknown time     acetaminophen (TYLENOL) 32 mg/mL solution 20.3 mLs (650 mg) by Oral or Feeding Tube route every 6 hours as needed for fever or mild pain 400 mL 0 12/16/2017 at 4 p.m     aspirin 81 MG chewable tablet 1 tablet (81 mg) by Oral or Feeding Tube route daily 36 tablet  12/16/2017 at 4 p.m     fiber modular (NUTRISOURCE FIBER) packet 1 packet by Per Feeding Tube route 3 times daily   12/16/2017 at 8 p.m     mirtazapine (REMERON) 7.5 MG TABS tablet GIVE 3 TABLETS(22.5MG) VIA G-TUBE AT BEDTIME 90 tablet 0 12/16/2017 at 8 p.m     loperamide (IMODIUM A-D) 2 MG tablet 2 tab by g tube 3 times daily PRN for diarrhea 30 tablet 0      potassium chloride (KLOR-CON) 20 MEQ Packet    12/16/2017 at 4 p.m     QUEtiapine (SEROQUEL) 25 MG tablet 1 tablet (25 mg) by Oral or Feeding Tube route 4 times daily as needed 60 tablet  12/16/2017 at 8 p.m     venlafaxine (EFFEXOR) 100 MG tablet Take 1 tablet (100 mg) by mouth 2 times daily 60 tablet 3 12/16/2017 at 4p.m     ondansetron (ZOFRAN) 4 MG tablet Take 1-2 tablets (4-8mg) every 8 hours as needed for nausea 20 tablet 0      amLODIPine (NORVASC) 5 MG tablet Take 1 tablet (5 mg) by mouth daily 180 tablet 3 12/16/2017 at 8 a.m     lactobacillus TABS Take 1 tablet by mouth 3 times daily        Menthol-Zinc Oxide 0.44-20.6 % OINT 1 application topically TID PRN for perianal irritation        Miconazole Nitrate 2 % POWD Apply in folds        moxifloxacin (VIGAMOX) 0.5 % ophthalmic solution Place 1 drop Into the left eye 3 times daily 1.25 mL 0      ketotifen (ZADITOR/REFRESH ANTI-ITCH) 0.025 % SOLN ophthalmic solution Place 1 drop Into the left eye 3 times daily 1 Bottle       White  Petrolatum-Mineral Oil (SYSTANE NIGHTTIME) OINT         atorvastatin (LIPITOR) 40 MG tablet TAKE 1 TABLET(40 MG) BY MOUTH DAILY 90 tablet 3 12/16/2017 at 8 a.m     rifampin (REIFADEN) 25 mg/mL SUSP 12 mLs (300 mg) by Oral or Feeding Tube route 2 times daily   12/16/2017 at 8 p.m     polyethylene glycol (MIRALAX/GLYCOLAX) Packet Take 17 g by mouth daily as needed for constipation 7 packet       multivitamins with minerals (CERTAVITE/CEROVITE) LIQD liquid 15 mLs by Per Feeding Tube route daily   12/16/2017 at 12 a.m     No current outpatient prescriptions on file.     Current Facility-Administered Medications   Medication Dose Route Frequency     high cardioplegia soln ([facility] formula)   PERFUSION Once     hot shot cardioplegia ([facility] formula)   PERFUSION Once     low cardioplegia ([facility] formula)   PERFUSION Once     pump prime ([facility] formula) solution   PERFUSION Once     heparin in saline (CELL SAVER) formula   PERFUSION Once     sodium chloride (PF)  3 mL Intracatheter Q8H     insulin aspart   Subcutaneous TID w/meals     acetaminophen  975 mg Oral Q8H     senna-docusate  1-2 tablet Oral BID     albuterol  2.5 mg Nebulization Q4H     mupirocin  0.5 g Both Nostrils BID     famotidine  20 mg Intravenous Q12H     acetylcysteine  2 mL Nebulization Q4H     albumin human         mirtazapine  30 mg Oral At Bedtime     rifampin  300 mg Oral or Feeding Tube BID     potassium chloride  40 mEq Oral Once     potassium chloride  40 mEq Oral Once     ketotifen  1 drop Left Eye TID     artificial tears   Ophthalmic At Bedtime     aspirin  81 mg Oral or Feeding Tube Daily     atorvastatin  40 mg Oral Daily     nafcillin continuous infusion  12 g/day Intravenous Q24H[MS1.3]     Family History:[MS1.1]   Family History   Problem Relation Age of Onset     Genetic Disorder Mother      Bone plateover growth Agustin. shoulder surgeries     CANCER Mother      brain cancer     Alzheimer Disease Father[MS1.3]      Social History:[MS1.1]   Social  History   Substance Use Topics     Smoking status: Former Smoker     Packs/day: 1.00     Years: 35.00     Quit date: 4/1/2016     Smokeless tobacco: Never Used      Comment: 4/2016     Alcohol use 0.0 oz/week     0 Standard drinks or equivalent per week      Comment: 1 drink per week avg, seldom[MS1.3]       ROS:   A comprehensive 10 point ROS was[MS1.1] negative[MS1.2] other than as mentioned in HPI.    Physical Examination:   VITALS:[MS1.1] BP (!) 82/58  Temp 100.6  F (38.1  C) (Pulmonary Artery)  Resp 20  Wt 84.6 kg (186 lb 8.2 oz)  SpO2 100%  BMI 28.36 kg/m2[MS1.3]     GENERAL APPEARANCE:[MS1.1] intubated[MS1.2]   HEENT:[MS1.1] intubated and sedated[MS1.2]  NECK: Supple. No JVD or bruit. Good carotid upstroke.   CHEST:[MS1.1] diminished in bases[MS1.2]  CARDIOVASCULAR: S1S2, Reg, No m/r/g.   ABDOMEN: BS+, soft, No pulsatile masses or bruits.   EXTREMITIES: No pedal edema. Distal pulses intact.   NEURO:[MS1.1] intubated[MS1.2]  PSYCH:[MS1.1] intubated[MS1.2]   SKIN: Warm and dry.   Data:   Labs:  BMP[MS1.1]  Recent Labs  Lab 12/20/17  0748 12/20/17  0412 12/20/17  0002 12/19/17  1806 12/19/17  1650  12/19/17  0606   NA  --  144 144 145* 143  < > 138   POTASSIUM 4.3 4.2 3.8 3.4 3.1*  < > 3.3*   CHLORIDE  --  109 109 109  --   --  100   IZABELLA  --  8.2* 7.8* 8.0*  --   --  8.6   CO2  --  25 26 29  --   --  29   BUN  --  13 11 10  --   --  9   CR  --  1.33* 1.22 1.16  --   --  1.38*   GLC  --  160* 179* 142* 112*  < > 112*   < > = values in this interval not displayed.[MS1.3]  CBC[MS1.1]  Recent Labs  Lab 12/20/17  0748 12/20/17  0412 12/20/17  0002 12/19/17  1806 12/19/17  1653  12/19/17  0606   WBC  --  15.5* 16.3* 16.1*  --   --  11.2*   RBC  --  2.62* 2.28* 3.04*  --   --  3.39*   HGB 8.1* 8.0* 7.0* 9.4* 9.6*  < > 10.7*   HCT  --  24.0* 21.4* 29.0*  --   --  34.0*   MCV  --  92 94 95  --   --  100   MCH  --  30.5 30.7 30.9  --   --  31.6   MCHC  --  33.3 32.7 32.4  --   --  31.5   RDW  --  18.0* 17.9* 17.4*   --   --  16.6*   PLT  --  358 338 303 283  < > 438   < > = values in this interval not displayed.[MS1.3]  INR[MS1.1]  Recent Labs  Lab 17  1806 17  1653 17  1515 17  0606   INR 0.90 0.81* 1.56* 1.27*     No results found for: CKTOTAL, CKMB, TROPN  Cholesterol (mg/dL)   Date Value   2016 105 (L)   2012 198     Cholesterol/HDL Ratio (no units)   Date Value   2012 4.2     HDL Cholesterol (mg/dL)   Date Value   2016 25   2012 47     LDL Cholesterol Calculated (mg/dL)   Date Value   2016 56   2012 123[MS1.3]     EKG:     Tele:       ECHO:   Recent Results (from the past 4320 hour(s))   ECHO LIMITED WITH OPTISON    Narrative    [id number]  [id number]  [id number]  [id number]^SILVIA^GABRIEL^VELASQUEZ           [facility],[place]  Echocardiography Laboratory  [address] [place] [id number]     Name: ASHTYN STROUD  MRN: [id number]  : 1955  Study Date: 2017 08:55 AM  Age: 62 yrs  Gender: Male  Patient Location: [facility] [place]  Reason For Study: Aortic Valve Replacement, Concern for acute AI  Ordering Physician: GABRIEL VEGA  Referring Physician: SELF, REFERRED  Performed By: BERLIN Charlton     BSA: 2.0 m2  Height: 68 in  Weight: 182 lb  BP: 123/94 mmHg  _____________________________________________________________________________  __        Procedure  Limited Portable Echo Adult. Contrast Optison. Optison (NDC #8272-9205-79)  given intravenously. Patient was given 6 ml mixture of 3 ml Optison and 6 ml  saline. 3 ml wasted. IV start location L Forearm .  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 Today, there is circumferential free space  noted surrounding the aortic graft, dehiscence and rocking of the graft from  the native aorta and severe AI in the  space surrounding the aortic graft  (perivalvular/perigraft). The circumferential free space was present on the  previous studies of October 2017 but was filled with echodense material and  the graft was not independently mobile, and there was no significant AI. The  prosthetic aortic valve leaflets are not well visualized today.  Findings were discussed with Dr. Tacho Muñoz.  _____________________________________________________________________________  __        Left Ventricle  Left ventricular wall thickness is normal. Mild left ventricular dilation is  present. The Ejection Fraction is estimated at 30-35%. Diastolic function not  assessed due to tachycardia. Severe diffuse hypokinesis is present.     Right Ventricle  The right ventricle is normal size. Global right ventricular function is  mildly reduced.     Atria  Both atria appear normal.     Mitral Valve  Trace mitral insufficiency is present.        Aortic Valve  See below.     Tricuspid Valve  The tricuspid valve is normal.     Vessels  The patient is post bio-Bentall - aortic root replacement with a 30 mm graft  and AVR with a Trifecta valve. Today, there is circumferential free space  noted surrounding the aortic graft, dehiscence and rocking of the graft from  the native aorta and severe AI in the space surrounding the aortic graft  (perivalvular/perigraft). The circumferential free space was present on the  previous studies of October 2017 but was filled with echodense material and  the graft was not independently mobile, and there was no significant AI. The  prosthetic aortic valve leaflets are not well visualized today.     Pericardium  No pericardial effusion is present.     Compared to Previous Study  This study was compared with the study from 10/06/2017 . Since the prior  study, the LV function has worsened and the aortic graft appears to be  dehisced, as described above.      _____________________________________________________________________________  __  MMode/2D Measurements & Calculations  asc Aorta Diam: 3.3 cm     EF(MOD-bp): 35.3 %           Doppler Measurements & Calculations  AI P1/2t: 122.3 msec     _____________________________________________________________________________  __           Report approved by: Crista Colorado 12/17/2017 10:21 AM          Assessment:   Cricket Miguel is a 62 year old male with hx of COPD, HTN, HLD, CAD, HFpEF, aortic stenosis and ascending aortic aneurism s/p aortic valve replacement and aortic root replacement (April 2016) c/b moises-aortic abscess, endocarditis, cerebral septic emboli and severe AI s/p redo-sternotomy aortic root and valve homograft 12/19.[MS1.1]     EP was consulted for AV dissociation and accelerated junctional rhythm.   At this point, p[MS1.2]rakesh is currently intubated[MS1.1] and continues on epinephrine and norepinephrine.    Reviewing the telemetry patient's HR is in the 80-90s.[MS1.2]      EP Recommendations:[MS1.1]  AV dissociation  - at this point there is no recommendation for intervention.  There is AV dissociation due to the junctional rhythm is at a higher rate than the SA node.  Recommend watchful waiting.  Pacing would not be recommended at this point as it would have to be set at 100 bpm and would be vpaced causing a wide complex QRS.  This patient now has narrow QRS which is favored over pacing with a wide complex QRS.[MS1.2]        The patient states understanding and is agreeable with plan.   Thank you for allowing us to participate in the care of this patient.     The patient was discussed w/ Dr.[MS1.1] Saulo[MS1.2].  The above note reflects our joint plan.[MS1.1]    Lona King,[MS1.2] BIBI CNP  Electrophysiology Consult Service  Pager: [phone][MS1.1]      ELECTROPHYSIOLOGY STAFF  Patient seen and examined by me.  History and physical examination discussed with Lona King whose note  reflects our joint assessment and recommendation/plans.  Asked to see this 62 year old gentleman s/p aortic valve and aortic root placement yesterday.  ECG computer reports AV dissociation.  ECG and monitor strips reviewed by me.  He has sinus rhythm and accelerated junctional rhythm.  He has AV dissociation because the junctional rhythm is faster than the sinus rate.  There is no AV block.  When the R-P interval is sufficiently long P-waves can be seen to conduct.  There is no indication for pacing.  The junctional rhythm will likely resolve as intrinsic and extrinsic catecholamines diminish.  I discussed the situation with patient's wife to reassure her.  Duy Rojas[SS1.1]         Revision History        User Key Date/Time User Provider Type Action    > SS1.1 12/20/2017  5:00 PM Duy Rojas MD Physician Sign     MS1.2 12/20/2017  4:03 PM Lona King APRN CNP Nurse Practitioner Sign     MS1.3 12/20/2017  9:11 AM Lona King APRN CNP Nurse Practitioner      MS1.1 12/20/2017  9:10 AM Lona King APRN CNP Nurse Practitioner             Consults signed by Mello Babin MD at 12/18/2017  3:22 PM      Author:  Mello Babin MD Service:  Psychiatry Author Type:  Physician    Filed:  12/18/2017  3:22 PM Date of Service:  12/18/2017 12:59 PM Creation Time:  12/18/2017  1:44 PM    Status:  Signed :  Mello Babin MD (Physician)         DATE OF SERVICE:  12/18/2017      REQUESTING SOURCE:  Cardiology 1 team.      IDENTIFYING DATA:  Cricket Miguel is a 52-year-old man seen today for psychiatric consultation to weigh in on his current psychiatric medications in the context of prolonged QTc (It has been up to 531 in recent days).      HISTORY OF PRESENT ILLNESS:  This gentleman had presented with increasing shortness of breath in the context of being status post aortic valve replacement and suffering a CVA due to septic emboli in early October.  He had  been participating in rehabilitation at [facility] and also at a transitional care unit, I believe.  It is a bit difficult for him to speak, but his girlfriend and 2 daughters were present during my interview to provide collateral information.  Apparently off and on since his stroke he has been quite agitated and his girlfriend said he will have what appear to be temper tantrums for about 5-6 hours at a time.  In talking with him about this he admits to being very frustrated by his physical limitations from the stroke.  Also, he does admit to feeling a bit down at times but denies being suicidal or hopeless.  He has history of some depression.  His wife  about 9 years ago and he was on some antidepressants for awhile.  Recently he has been having some anxiety as well and received 25 mg of Ativan last night which seemed to help quite a bit.  He has used Seroquel on a regular basis, which helps just modestly according to his girlfriend.      PAST PSYCHIATRIC HISTORY:  No psychiatric hospitalizations.  He has been on his current psychiatric medications since his hospitalization at [facility].  Also used antidepressants for awhile at the time his wife had .      He has not had a chemical dependency treatment that I am aware of.  Apparently he has been a regular cannabis smoker but very light drinker.  He quit smoking cigarettes last year.      PAST MEDICAL HISTORY:  Abscessed aortic root with associated dilatation and regurgitation, cardiomyopathy, status post CVA, hypertension, inguinal hernia.      PAST SURGICAL HISTORY:  He states the knee and shoulder surgeries, inguinal herniorrhaphy and aortic valve replacement.      ALLERGIES:  Lisinopril.      REVIEW OF SYSTEMS:  Ten-point review of systems today is completed and is negative except for slight headache and right-sided weakness.  He has difficulty talking due to his left-sided facial droop.      OUTPATIENT PSYCHIATRIC MEDICATIONS:   1.  Abilify 2 mg 3 tablets  "per day.   2.  Remeron 7.5 mg 3 at bedtime.   3.  Effexor 100 mg twice a day.   4.  Seroquel 25 mg 3 times per day.        The Effexor, Abilify and Remeron are being continued.  Seroquel was being held due to concerns over QTc.  He received several doses of lorazepam last night which seemed to help quite a bit.  He had 2 doses of 5 mg olanzapine last night for agitation.      FAMILY HISTORY:  According to his girlfriend, is negative for depression or significant psychiatric problems.      SOCIAL HISTORY:  He lives in [place].  He is working in business in a sheet metal fabrication.  He has 2 daughters and a son.  His wife  approximately 9 years ago and he has been with his current partner for about 6 years.  It appears he has a very supportive family.      MENTAL STATUS EXAMINATION:  He is lying quietly in bed, is noted to have left-sided facial droop which does lead to significant dysarthria.  There is no muscular rigidity or tremor present.  He is reasonably well groomed.  Associations tight.  Denies delusions and he had one hallucination of a bug in the room yesterday.  No paranoia.  Mood described as \"okay.\"  Affect is slightly restricted.  He is oriented to day of the week, place but not the date.  Recent and remote memory, attention span and concentration are slightly impaired.  Use of language, fund of knowledge within normal limits.  Insight and judgment fair.      VITAL SIGNS:  Blood pressure 123/94, pulse 104, temperature 97.7.      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  His girlfriend is invested in having him on fewer psychiatric medications so I think this needs to be a priority.  Normally I would not use benzodiazepines in the context of delirium.  However, I think his delirium " is quite mild and it certainly helped him settle down last night according to his girlfriend. Will need to continue to closely follow QTc.      RECOMMENDATIONS:   1.  He may discontinue the Abilify.   2.  Drop the Effexor to 50 mg twice a day for 3 days and then discontinue.   3.  Increase Remeron to 30 mg at bedtime.   4.  It is okay to continue the Ativan 0.5- 1.0 mg q.6 h. IV p.r.n. anxiety.   5.  Seroquel 25 mg q.i.d. p.r.n. anxiety and agitation.   6.  Haldol 1-2 mg IV q.4h. for severe agitation.   7.  Reconsult Psychiatry as needed.         MELLO BABIN MD             D: 2017 12:59   T: 2017 13:44   MT:       Name:     ASHTYN STROUD   MRN:      [id number]        Account:       [id number]   :      1955           Consult Date:  2017      Document: [id number][DA1.1]         Revision History        User Key Date/Time User Provider Type Action    > DA1.1 2017  3:22 PM Mello Babin MD Physician Sign     [N/A] 2017  1:44 PM Mello Babin MD Physician Edit            Consults by Mello Babin MD at 2017 12:43 PM     Author:  Mello Babin MD Service:  Psychiatry Author Type:  Physician    Filed:  2017 12:59 PM Date of Service:  2017 12:43 PM Creation Time:  2017 12:37 PM    Status:  Addendum :  Mello Babin MD (Physician)     Consult Orders:    1. Psychiatry IP Consult: depression/anxiety with multiple med and QTc prolongation. Familyrequest psychologist to help with depression too; Consultant may enter orders: Yes; Patient to be seen: Routine; Call back #: *[phone] [[id number]] ordered by Sherry Peraza MD at 17 0383                Patient seen and chart reviewed. Note dictated (initial)   RECOMMENDATIONS:  May stop Abilify   Drop Effexor to 50 mg twice a day for 3 days, then d/c  Increase Remeron to 30 mg HS   Ok to continue Ativan 0.5 mg[DA1.1] IV[DA1.2] q 6 hours PRN anxiety   Seroquel 25 mg QID PRN  anxiety, agitation   Haldol 1-2 mg IV q 4 hours for severe agitation.   Re-consult psychiatry as needed.[DA1.1]        Revision History        User Key Date/Time User Provider Type Action    > DA1.2 2017 12:59 PM Mello Babin MD Physician Addend     DA1.1 2017 12:43 PM Mello Babin MD Physician Sign            Consults by Paz Cedillo MD at 2017  7:00 PM     Author:  Paz Cedillo MD Service:  Infectious Disease Author Type:  Physician    Filed:  2017 11:58 PM Date of Service:  2017  7:00 PM Creation Time:  2017 10:04 PM    Status:  Signed :  Paz Cedillo MD (Physician)     Consult Orders:    1. Infectious Disease General Adult IP Consult: Patient to be seen: Routine within 24 hrs; Call back #: [phone]; continual of antibiotics with aortic graft dehiscent; Consultant may enter orders: Yes [[id number]] ordered by Sherry Peraza MD at 17 1247                  [facility] SERVICE: NEW CONSULTATION   Cricket Miguel : 1955 Sex: male:   Medical record number [id number]  Date of Admission: 17  Date of Service:[AL1.1] 2017[AL1.2]     REASON FOR CONSULTATION: History of MSSA prosthetic valve endocarditis, now[AL1.1] with pseudoaneurysm and moises-graft leak.[AL1.3]     PROBLEM LIST:   1.[AL1.1] Worsening aortic insufficiency with presumed moises-graft leak in the setting of known periaortic abscess[AL1.3]  2.  MSSA prosthetic aortic valve endocarditis diagnosed in 10/17 and treated with[AL1.1] long-term[AL1.3] nafcillin and rifampin  3. Left knee septic arthritis[AL1.1] upon original presentation in 10/17[AL1.3]  4. Multifocal acute infarctions involving the left parietotemporal lobes, left cerebellar hemisphere, and right occipital lobe presumed to be septic emboli    RECOMMENDATIONS:   1.[AL1.1] Continue nafcillin and rifampin unchanged  2. At the time of surgery, please send tissue for aerobic, anaerobic, fungal, and  AFB cultures. Please send tissue or fluid, NOT swabs.[AL1.3]     DISCUSSION:[AL1.1]   Mr. Miguel is a[AL1.3] 62 year old man[AL1.1] with known MSSA prosthetic valve endocarditis with perivalvular abscess.  There is no clear indication to date that he has any new or worsening infection.  Therefore, we will continue nafcillin and rifampin.  However, at the time of surgery would still send tissue for cultures as above.  It will be important to assess whether staph aureus still grows from the tissue since this will help to inform duration of therapy.  While unlikely, it is also important to rule out other pathogens so would also send anaerobic, AFB, and fungal cultures.[AL1.3]    Thank you for this consult. The [facility] infectious disease team will continue to follow this patient. Dr. Voss will take over the service tomorrow.    Paz Cedillo MD  Infectious Diseases  [phone]       HPI:[AL1.1]   The patient had been very agitated just before my visit and was resting.  Therefore the HPI was taken from chart review including portions of the infectious disease consult done by Dr. Morgan Pereira in October 2017 with additional information provided by the patient's daughter and sister who were at the bedside.     Mr. Miguel is a 63 yo man[AL1.3] with a history of severe severe aortic valve insufficiency and a 5 cm aortic root aneurysm and severe single vessel coronary artery disease involving an obtuse marginal branch. He underwent a Bentall procedure (aortic valve replacement and aortic root replacement with a composite graft constructed using a 27 mm St. Isidro Trifecta (bovine pericardial) valve and a 30 mm Mckenzie Cardioroot Valsalva tube graft), on 5/17/16.  He was discharged from the hospital with no complications nor signs of infection.  Prior to this surgery, he had had an episode of bacteremia that was diagnosed in [place] and [place] (he was traveling via  at the time[AL1.1]).  It seems clear that the pathogen was  staph aureus and that it was most likely MSSA.  Details of treatment are not known at this time the patient's partner will likely be bringing records tomorrow.[AL1.3]      He presented to the [facility] ED[AL1.1] in October 2017[AL1.3] for evaluation altered mental status[AL1.1] and knee pain.  He required intubation due to confusion and respiratory distress.  A CT was done and found a fluid collection with enhancing margin around the aortic root and ascending thoracic aorta concerning for dissection or infected fluid collection.  He is also noted to have possible right pyelonephritis and a tap of his painful knee was suggestive of infection.  He then also had a brain MRI which showed m[AL1.3]ultifocal acute infarctions involving the left parietotemporal lobes, left cerebellar hemisphere, and right occipital lobe.[AL1.1] He was found to have MSSA prosthetic valve endocarditis with presumed CNS embolic phenomena.  At discharge the plan was for a long course of antibiotics along with rehab followed by valve replacement in early 2018. Antibiotic plan was as follows: Levofloxacin 750 mg IV q 24 hours until 11/04/17. Nafcillin 2 gm q4 hours until patient has aortic root surgery.  Minimum 3 months (01/07/18). Rifampin suspension 300 mg per feeding tube 2 times daily until patient has aortic root surgery.  Minimum 3 months (01/07/18).     The patient was discharged to [facility] and then to home.  However, over the past days to weeks, he developed worsening shortness of breath and was readmitted on 12/16/2017.  Unfortunately, imaging upon arrival showed a suspected perigraft subvalvular leak into the native ascending aortic remnant.  He currently remains on nafcillin and rifampin and surgery is planned early this week.    On admission he had a slight leukocytosis of 11.2 without neutrophilia but, interestingly, with some eosinophilia.  His CRP was 310 upon his original admission.  It had decreased to 30 at last check on  October 23.  It is now 65.  Blood cultures from admission are pending and negative to date.[AL1.3]      ROS:   A ten point review of systems was not obtained due to[AL1.1] altered mental status,[AL1.3]    PMH:[AL1.1]   Past Medical History:   Diagnosis Date     Abscess of aortic root 09/2017     AI (aortic insufficiency)     severe     Anxiety      Aortic root dilatation (H)      AR (aortic regurgitation)     severe     Cardiomyopathy (H)      CHF (congestive heart failure), NYHA class II (H)      COPD, mild (H)     spirometry 12/16     CVA (cerebral vascular accident) (H) 09/2017    septic emboli - MSSA - cerebellum, Left MCA, Rt Occipital, Aphasia, Left visual field cut, moderate to severe encephalopathy     Depression 09/2017     Heart murmur      Hyperlipidemia LDL goal <70 10/31/2010     Hypertension goal BP (blood pressure) < 140/90      Inguinal hernia      left lung nodule  1/29/2008     Major depressive disorder, single episode, moderate (H)      Psoriasis childhood     Tobacco use disorder      Past Surgical History:   Procedure Laterality Date     ARTHROSCOPY KNEE INCISION AND DRAINAGE Left 10/8/2017    Procedure: ARTHROSCOPY KNEE INCISION AND DRAINAGE;  Arthroscopic Incision and Drainage of Left Knee;  Surgeon: Lucretia Munoz MD;  Location: [facility] OR     HC SHOULDER ARTHROSCOPY, DX  2001    Arthroscopy, Shoulder RT Dr OSIRIS Andersen     HC SHOULDER ARTHROSCOPY, DX  1/04    LT arthroscopy Dr OSIRIS Andersen     HERNIA REPAIR, UMBILICAL  1/08    incarcerated - dr rockwell     HERNIORRHAPHY INGUINAL  12/21/2012    HERNIORRHAPHY INGUINAL;  Right Inguinal Hernia Repair with mesh ;  Surgeon: Mello Rockwell MD;  Location: [facility] OR     REPLACE VALVE AORTIC N/A 5/17/2016    REPLACE VALVE AORTIC - Dr Bowers     ROTATOR CUFF REPAIR RT/LT  11/10    Rt arthroscopy - Dr OSIRIS Andersen     SURGICAL HISTORY OF -   5/16    AVR, severe AR, aortic root repair     TRACHEOSTOMY PERCUTANEOUS  10/27/2017[AL1.4]            SOCIAL HISTORY  AND RISK FACTORS[AL1.1]   Social History   Substance Use Topics     Smoking status: Former Smoker     Packs/day: 1.00     Years: 35.00     Quit date: 4/1/2016     Smokeless tobacco: Never Used      Comment: 4/2016     Alcohol use 0.0 oz/week     0 Standard drinks or equivalent per week      Comment: 1 drink per week avg, seldom     History   Sexual Activity     Sexual activity: Yes     Partners: Female[AL1.4]     Lives with[AL1.1] partner[AL1.3] in [place], [place]; daughter in area. H/o tobacco used; quit in 4/2016     FAMILY HISTORY:   Reviewed and non-contributory[AL1.1]  Family History   Problem Relation Age of Onset     Genetic Disorder Mother      Bone plateover growth Agustin. shoulder surgeries     CANCER Mother      brain cancer     Alzheimer Disease Father[AL1.4]        EXAMINATION: (Recommend ? 9 systems; 2 items each)[AL1.1]   BP (!) 88/61  Temp 98  F (36.7  C) (Axillary)  Resp 20  Wt 83 kg (182 lb 15.7 oz)  SpO2 90%  BMI 27.82 kg/m2[AL1.4]  GENERAL:  well-developed, well-nourished, in bed in no acute distress  EYES:  Eyes have anicteric sclerae without conjunctival injection   ENT:  Atraumatic. oral intubation  NECK:  Supple  LUNGS:  Clear to auscultation bilateral. Respiratory effort is normal  CARDIOVASCULAR:[AL1.1]  Regular . Blowing murmur present.[AL1.3]   ABDOMEN:  Normal bowel sounds, soft[AL1.1]. PEG in place.[AL1.3]   SKIN:  No acute rashes  NEUROLOGIC:[AL1.1]  Sleeping through exam.[AL1.3]   PSYCH: Unable to assess  CURRENT LINES:[AL1.1] PICC[AL1.3]    RELEVANT DATA:   Reviewed medicine test (PFTs, EKG, cardiac echo or cath): YES ECHO[AL1.1] reviewed with cardiology team.[AL1.3]     BASIC LABS   The following basic labs were personally reviewed:  Inflammatory Markers[AL1.1]    Recent Labs   Lab Test  12/17/17   0730  10/23/17   0341  10/21/17   0357  10/21/17   0335  10/16/17   0323  10/09/17   0316  10/07/17   1333  10/06/17   1859  05/06/13   1703  02/02/11   1506   SED   --   122*  108*   --     --    --    --    --   6  8   CRP  65.0*  30.0*   --   44.0*  48.0*  240.0*  290.0*  310.0*  <5.0  9.6*[AL1.4]       Hematology Studies[AL1.1]    Recent Labs   Lab Test  12/17/17 2024 12/17/17   0019  12/16/17 1935 11/02/17   0324  11/01/17   0337  10/31/17   0350   10/06/17   1020   05/06/13   1703  12/04/12   1016  02/02/11   1506   WBC  10.3  11.2*  9.8  6.4  5.9  6.5   < >  17.1*   < >  10.6  11.9*  11.9*   ANEU   --   7.8  6.6   --    --    --    --   15.7*   --   7.2  7.9  8.2   AEOS   --   1.1*  1.0*   --    --    --    --   0.0   --   0.0  0.4  0.3   HGB  10.5*  10.6*  10.3*  9.1*  8.5*  8.4*   < >  15.0   < >  15.8  16.2  16.0   MCV  99  100  99  99  98  101*   < >  88   < >  88  89  89   PLT  428  420  391  201  217  229   < >  185   < >  315  259  315    < > = values in this interval not displayed.[AL1.4]     Metabolic Studies[AL1.1]     Recent Labs   Lab Test  12/17/17 2024 12/17/17   0730  12/17/17 0019  12/16/17 1935 11/02/17   1148  11/02/17   0324   NA  140  137  137  137   --   143   POTASSIUM  3.4  3.6  3.7  3.9  4.0  3.3*   CHLORIDE  102  102  101  101   --   106   CO2  30  27  27  28   --   30   BUN  10  10  10  10   --   24   CR  1.22  1.13  1.14  1.10   --   1.31*   GFRESTIMATED  60*  65  65  67   --   55*[AL1.4]       Hepatic Studies[AL1.1]    Recent Labs   Lab Test  12/17/17 2024 12/17/17 0019  11/01/17   0337  10/24/17   0405  10/21/17   0335  10/06/17   1020  06/03/16   1716  04/26/16   0654   12/04/12   1016   BILITOTAL  1.0  1.3   --    --    --   1.5*  0.3  0.6   --   0.4   ALKPHOS  94  102   --    --    --   85  104  91   --   85   ALBUMIN  1.6*  1.6*   --    --    --   2.8*  3.4  3.1*   --   4.0   AST  29  33  28  46*  52*  24  19  20   < >  41   ALT  32  34  52  42  35  33  32  26   < >  54    < > = values in this interval not displayed.[AL1.4]       Thyroid Studies[AL1.1]    Recent Labs   Lab Test 04/03/16 02/07/12   1214   TSH  0.945  1.14[AL1.4]        MICROBIOLOGY LABS  The following microbiology studies were personally reviewed:[AL1.1]  Culture Micro   Date Value Ref Range Status   12/17/2017 No growth after 6 hours  Preliminary   12/17/2017 No growth after 6 hours  Preliminary   10/21/2017 No growth  Final   10/21/2017 No growth  Final   10/20/2017 No growth  Final   10/20/2017 No growth  Final   10/19/2017 No growth  Final   10/19/2017 No growth  Final   10/18/2017 No growth  Final   10/18/2017 No growth  Final   10/17/2017 No growth  Final   10/17/2017 No growth  Final   10/16/2017 No growth  Final   10/16/2017 No growth  Final   10/15/2017 No growth  Final   10/15/2017 No growth  Final   10/14/2017 No growth  Final   10/14/2017 No growth  Final   10/13/2017 No growth  Final   10/13/2017 (A)  Final    Cultured on the 1st day of incubation:  Staphylococcus aureus  Susceptibility testing done on previous specimen     10/13/2017   Final    Hanna Ba R.N. on [id number] at 10:41am on 10.14.17 JT.   10/12/2017 No growth  Final   10/11/2017 (A)  Final    Cultured on the 1st day of incubation:  Staphylococcus aureus     10/11/2017   Final    Critical Value/Significant Value, preliminary result only, called to and read back by  Javier Nesbitt RN 0136 10/12/17. MS     10/11/2017 Susceptibility testing done on previous specimen  Final   10/10/2017 (A)  Preliminary    Cultured on the 1st day of incubation:  Staphylococcus aureus     10/10/2017   Preliminary    Critical Value/Significant Value, preliminary result only, called to and read back by  Esther Abdullahi RN, @8169 10/10/17.     10/10/2017   Preliminary    Susceptibility testing requested by   for Rifampin.10/18/17 at 0853.TV.     10/10/2017   Preliminary    (Note)  POSITIVE for STAPHYLOCOCCUS AUREUS and NEGATIVE for the mecA gene  (not MRSA) by [email or web address] multiplex nucleic acid test. The mecA gene was  not detected. Final identification and antimicrobial susceptibility  testing will be verified by standard  methods.    Specimen tested with [employer] multiplex, gram-positive blood culture  nucleic acid test for the following targets: Staph aureus, Staph  epidermidis, Staph lugdunensis, other Staph species, Enterococcus  faecalis, Enterococcus faecium, Streptococcus species, S. agalactiae,  S. anginosus grp., S. pneumoniae, S. pyogenes, Listeria sp., mecA  (methicillin resistance) and Yvan/B (vancomycin resistance).    Critical Value/Significant Value called to and read back by Esther Abdullahi RN at 0132 [id number]     10/08/2017 No anaerobes isolated  Final   10/08/2017 No growth  Final   10/08/2017 Culture negative after 29 days  Final   10/06/2017 Light growth  Staphylococcus aureus   (A)  Final   10/06/2017   Final    Critical Value/Significant Value, preliminary result only, called to and read back by  Jodi Recio RN. 10.7.17 @ 1416. sb     10/06/2017 (A)  Final    On day 14, isolated in the broth only:   Probable  Anaerobic diptheroids     10/06/2017 Unable to isolate  Final   10/06/2017 (A)  Final    Cultured on the 1st day of incubation:  Staphylococcus aureus     10/06/2017   Final    Critical Value/Significant Value, preliminary result only, called to and read back by  Yarely Hull RN, @ 8798 10.06.2017 BL     10/06/2017 Susceptibility testing done on previous specimen  Final   10/06/2017 (A)  Final    Cultured on the 1st day of incubation:  Staphylococcus aureus     10/06/2017   Final    Critical Value/Significant Value, preliminary result only, called to and read back by  Yarely Hull RN on 10/6/2017 @2024, tk     10/06/2017   Final    (Note)  POSITIVE for STAPHYLOCOCCUS AUREUS and NEGATIVE for the mecA gene  (not MRSA) by [employer] multiplex nucleic acid test. The mecA gene was  not detected. Final identification and antimicrobial susceptibility  testing will be verified by standard methods.    Specimen tested with Verigene multiplex, gram-positive blood culture  nucleic acid test for the following  targets: Staph aureus, Staph  epidermidis, Staph lugdunensis, other Staph species, Enterococcus  faecalis, Enterococcus faecium, Streptococcus species, S. agalactiae,  S. anginosus grp., S. pneumoniae, S. pyogenes, Listeria sp., mecA  (methicillin resistance) and Yvan/B (vancomycin resistance).    Critical Value/Significant Value called to and read back by Yarely Hull RN, @ 0278 10.06.2017 BL     04/25/2016 No growth  Final   04/25/2016 No growth  Final   10/02/2002 No growth  Final[AL1.4]       Urine Studies[AL1.1]    Recent Labs   Lab Test  10/06/17   1115  05/03/16   1110  12/04/12   1016  10/25/10   1350   LEUKEST  Negative  Negative  Negative  Negative   WBCU  <1  0  O - 2  O - 2[AL1.4]       IMAGING RESULTS[AL1.1]  12/17/2017 CT chest abdomen and pelvis:      IMPRESSION:   1. The periaortic fluid collection or aneurysm is slightly increased  in size, however there is new diffuse contrast opacification of this  area and suspected perigraft subvalvular leak into the native  ascending aortic remnant. This area also appears to communicate with  the left ventricle, and that the root graft with valve replacement has  migrated cranially and  from the LV.  2. Diffuse bladder wall thickening, which may be related to bladder  outlet obstruction or decompressed state.[AL1.3]         Revision History        User Key Date/Time User Provider Type Action    > AL1.3 12/17/2017 11:58 PM Paz Cedillo MD Physician Sign     AL1.2 12/17/2017 11:08 PM Paz Cedillo MD Physician      AL1.4 12/17/2017 11:07 PM Paz Cedillo MD Physician      AL1.1 12/17/2017 11:00 PM Paz Cedillo MD Physician             Consults by Gabriela Hoffman MD at 12/17/2017  2:48 AM     Author:  Gabriela Hoffman MD Service:  Cardiothoracic Surgery Author Type:  Resident    Filed:  12/17/2017  3:27 AM Date of Service:  12/17/2017  2:48 AM Creation Time:  12/17/2017  2:47 AM    Status:  Attested :  Fernando  Gabriela Matamoros MD (Resident)    Cosigner:  Rosendo Gama MD at 12/17/2017  1:31 PM         Consult Orders:    1. Cardiothoracic Surgery Adult IP Consult: Patient to be seen: Routine within 24 hrs; Call back #: [phone]; h/o aortic root and valve replaced, recent admission for sepsis/fluid collection around valve.  New severe AI; Consultant may enter ord... [[id number]] ordered by Cong Lei MD at 12/17/17 0230           Attestation signed by Rosendo Gama MD at 12/17/2017  1:31 PM        Attestation:    I personally evaluated this patient on 12/17 and agree with the history as noted.      In short, 62 year old male who underwent bio-Bentall aortic root replacement in May of 2016. His post op course was unremarkable. This fall, more than a year post op, he presented with altered mental status and was found to have a CVA as well as a moises aortic graft fluid collection. He had a prolonged recovery from his CVA, including trach and PEG, as well as inpatient rehab at [place], but has been home on continued antibiotic therapy for several weeks, and trach decannulated. He is tentatively scheduled for redo aortic root replacement with cryo preserved allograft in early 2018 pending his overall clinical condition.    He was admitted overnight after presenting with progressive dyspnea in recent weeks. He denies other specific complaints. His vitals have been stable since admission, with labs revealing essentially normal WBC, preserved end organ function, and some malnutrition with albumin 1.6.     CT scan today reveals persistent mediastinal fluid collection, overall unchanged in size from prior scan. However, there is now contrast enhancement within the collection suggestive of pseudoaneurysm. This abuts the posterior sternal table.    ECHO suggests the same, with apparently normal bioprosthetic valve function, but with flow around the aortic root into the surrounding space. It appears the  valve is secure within the aortic graft, but that the annular suture line is disrupted with moises graft flow.     On exam, no distress in bed, HR 90s, /90, O2 sat 100% on 2L NC. No significant peripheral edema. Well healed trach site. Able to converse, but still with notable facial droop and hemiparesis. He remains non ambulatory. He is accompanied by several family members.    I discussed the case extensively with the cardiology and ICU teams, as well as my surgical colleague Dr Charles Nogueira.    The patient will certainly require a homograft aortic root replacement, likely with peripheral CPB and possible DHCA during re entry given proximity of pseudoaneurysm to the sternum. His course suggests that this has been a slowly progressive change and he remains stable at present, so I think it would be prudent to repair this in the next few days. Obviously we will proceed sooner should his condition worsen.     I explained this disease process and the suggested treatment at length with the patient and his family. We discussed that risks include, but are not limited to, stroke, organ failure, myocardial infarction, prolonged ventilation or recovery, need for a pacemaker, bleeding, infection, and death. Although difficult to estimate with precision, his mortality risk is likely 15-20% at a minimum. His morbidity is admittedly much higher.       Thank you for the opportunity to participate in the care of this unfortunate gentleman.      Rosendo Gama MD                                                            History and Physical     Cricket Miguel MRN# [id number]   YOB: 1955 Age: 62 year old      Date of Admission:  12/16/2017        Chief Complaint:   CC:[SS1.1] SOB[SS1.2]         History of Present Illnes:     62M with PMH of aortic stenosis and ascending aortic aneurysm repair with[SS1.1] h[SS1.3]istory of sepsis of unknown origin in April 2016, found to have aortic stenosis and ascending aortic  aneurysm. He is s/p Bentall procedure on 5/1[SS1.1]6[SS1.3] with Dr. Bowers. He was discharged to home in stable condition at that time. He represented on October 6 with AMS after a fall and was found to have a CVA with[SS1.1] and found to have a fluid[SS1.2] collection[SS1.1] concerning for a large pariaortic abscess around replaced aortic root that was the source of his septic emboli leading to his CVA. He received a trach and PEG and was discharged to a rehab center and had been receiving IV antibiotics through a PICC in his R arm. He has risidual right sided UE weakness and left sided facial droop. He is able to swallow but also gets nutrition the PEG.  He was in his usual state of health undergoing rehabilitation when over the past 2 days he developed increasing shortness of breath. He went to [facility] ER today and was found to be volume overloaded. He received Lasix and was transferred here for further care. Initially there was a plan for him to have a graft replacement surgery in January. On echo tonight he appears to have severe aortic insufficiency with communication between the native aorta and the graft per cardiology. He has maintained his BPs but has had tachycardia to low 100s. Other VSS. Troponin and BNP elevated. Normal WBC.[SS1.2]     Past Medical History:  Past Medical History:   Diagnosis Date     Abscess of aortic root 09/2017     AI (aortic insufficiency)      Anxiety      Aortic root dilatation (H)      AR (aortic regurgitation)     severe     Cardiomyopathy (H)      CHF (congestive heart failure), NYHA class II (H)      COPD, mild (H)     spirometry 12/16     CVA (cerebral vascular accident) (H) 09/2017    septic emboli - MSSA - cerebellum, Left MCA, Rt Occipital, Aphasia, Left visual field cut, moderate to severe encephalopathy     Depression 09/2017     Heart murmur      Hyperlipidemia LDL goal <70 10/31/2010     Hypertension goal BP (blood pressure) < 140/90      Inguinal hernia       left lung nodule  1/29/2008     Major depressive disorder, single episode, moderate (H)      Psoriasis childhood     Tobacco use disorder        Past Surgical History:  Past Surgical History:   Procedure Laterality Date     ARTHROSCOPY KNEE INCISION AND DRAINAGE Left 10/8/2017    Procedure: ARTHROSCOPY KNEE INCISION AND DRAINAGE;  Arthroscopic Incision and Drainage of Left Knee;  Surgeon: Lucretia Munoz MD;  Location: [facility] OR     HC SHOULDER ARTHROSCOPY, DX  2001    Arthroscopy, Shoulder RT Dr OSIRIS Andersen     HC SHOULDER ARTHROSCOPY, DX  1/04    LT arthroscopy Dr OSIRIS Andersen     HERNIA REPAIR, UMBILICAL  1/08    incarcerated - dr rockwell     HERNIORRHAPHY INGUINAL  12/21/2012    HERNIORRHAPHY INGUINAL;  Right Inguinal Hernia Repair with mesh ;  Surgeon: Mello Rockwell MD;  Location: [facility] OR     REPLACE VALVE AORTIC N/A 5/17/2016    REPLACE VALVE AORTIC - Dr Bowers     ROTATOR CUFF REPAIR RT/LT  11/10    Rt arthroscopy - Dr OSIRIS Andersen     SURGICAL HISTORY OF -   5/16    AVR, severe AR, aortic root repair     TRACHEOSTOMY PERCUTANEOUS  10/27/2017            Allergies:     Allergies   Allergen Reactions     Lisinopril Swelling     One-sided facial swelling after being on lisinopril/HCTZ for one week.        Medications:    Current Facility-Administered Medications on File Prior to Encounter:  [COMPLETED] furosemide (LASIX) injection 40 mg     Current Outpatient Prescriptions on File Prior to Encounter:  ARIPiprazole (ABILIFY) 2 MG tablet Take 3 tablets (6 mg) by mouth daily   hydrochlorothiazide (HYDRODIURIL) 25 MG tablet Take 0.5 tablets (12.5 mg) by mouth daily   labetalol (NORMODYNE) 100 MG tablet Take 1 tablet (100 mg) by mouth every 8 hours   D5W 1,000 mL with nafcillin 12 g continuous infusion Inject 12 g into the vein every 24 hours   famotidine (PEPCID) 40 MG/5ML suspension Take 2.5 mLs (20 mg) by mouth 2 times daily   acetaminophen (TYLENOL) 32 mg/mL solution 20.3 mLs (650 mg) by Oral or Feeding Tube  route every 6 hours as needed for fever or mild pain   aspirin 81 MG chewable tablet 1 tablet (81 mg) by Oral or Feeding Tube route daily   fiber modular (NUTRISOURCE FIBER) packet 1 packet by Per Feeding Tube route 3 times daily   mirtazapine (REMERON) 7.5 MG TABS tablet GIVE 3 TABLETS(22.5MG) VIA G-TUBE AT BEDTIME   loperamide (IMODIUM A-D) 2 MG tablet 2 tab by g tube 3 times daily PRN for diarrhea   potassium chloride (KLOR-CON) 20 MEQ Packet    QUEtiapine (SEROQUEL) 25 MG tablet 1 tablet (25 mg) by Oral or Feeding Tube route 4 times daily as needed   venlafaxine (EFFEXOR) 100 MG tablet Take 1 tablet (100 mg) by mouth 2 times daily   ondansetron (ZOFRAN) 4 MG tablet Take 1-2 tablets (4-8mg) every 8 hours as needed for nausea   amLODIPine (NORVASC) 5 MG tablet Take 1 tablet (5 mg) by mouth daily   lactobacillus TABS Take 1 tablet by mouth 3 times daily   Menthol-Zinc Oxide 0.44-20.6 % OINT 1 application topically TID PRN for perianal irritation   Miconazole Nitrate 2 % POWD Apply in folds   moxifloxacin (VIGAMOX) 0.5 % ophthalmic solution Place 1 drop Into the left eye 3 times daily   ketotifen (ZADITOR/REFRESH ANTI-ITCH) 0.025 % SOLN ophthalmic solution Place 1 drop Into the left eye 3 times daily   White Petrolatum-Mineral Oil (SYSTANE NIGHTTIME) OINT    atorvastatin (LIPITOR) 40 MG tablet TAKE 1 TABLET(40 MG) BY MOUTH DAILY   rifampin (REIFADEN) 25 mg/mL SUSP 12 mLs (300 mg) by Oral or Feeding Tube route 2 times daily   polyethylene glycol (MIRALAX/GLYCOLAX) Packet Take 17 g by mouth daily as needed for constipation   multivitamins with minerals (CERTAVITE/CEROVITE) LIQD liquid 15 mLs by Per Feeding Tube route daily       Social History:  Social History     Social History     Marital status:      Spouse name: N/A     Number of children: 3     Years of education: 12     Occupational History     Not on file.     Social History Main Topics     Smoking status: Former Smoker     Packs/day: 1.00     Years:  35.00     Quit date: 4/1/2016     Smokeless tobacco: Never Used      Comment: 4/2016     Alcohol use 0.0 oz/week     0 Standard drinks or equivalent per week      Comment: 1 drink per week avg, seldom     Drug use: Yes      Comment: marijuana- daily     Sexual activity: Yes     Partners: Female     Other Topics Concern     Caffeine Concern Yes     3 cups     Sleep Concern No     Special Diet No     trying to watch salt     Exercise Yes     walking     Seat Belt No     Parent/Sibling W/ Cabg, Mi Or Angioplasty Before 65f 55m? No     Social History Narrative       Family History:  Family History   Problem Relation Age of Onset     Genetic Disorder Mother      Bone plateover growth Agustin. shoulder surgeries     CANCER Mother      brain cancer     Alzheimer Disease Father        ROS:  The remainder of the complete ROS was negative unless noted in the HPI.    Exam:  /89  Temp 98  F (36.7  C) (Axillary)  Resp 20  Wt 83 kg (182 lb 15.7 oz)  SpO2 98%  BMI 27.82 kg/m2  General: interactive, NAD  HEENT: AT/NC, sclera anicteric  Neck: Supple, no JVD    Resp: clear to auscultation bilaterally   Cardiac: regular rate and rhythm   Abdomen: Soft, nontender, nondistended. +BS.[SS1.1]  PEG in place, no erythema[SS1.2]  Extremities: No LE edema  Skin: Warm and dry, no jaundice or rash  Neuro: Alert & oriented x 3, Cns 2-12 intact, moves all extremities equally    Labs:[SS1.1]  Hg 10.6  WBC 11.2  Troponin 0.554  BNP 92128[SS1.2]    Imaging:[SS1.1]  Official[SS1.3] ECHO pending[SS1.2]    Assessment/ Plan:[SS1.1] 62M with severe aortic insufficiency and known periaortic abscess. S/p[SS1.3] Bentall procedure on 5/1[SS1.1]6, complicated by[SS1.3] large pariaortic abscess[SS1.2] and CVA in October 2017 with risidual R sided weakness. Now with new severe aortic insufficiency due to aortic abscess.     -repeat  Echo with imaging uploading into system  -follow up repeat blood cultures  -medical management of aortic insufficiency for  now, currently stable  -CT surgery team to see in AM    Discussed with Dr. Ma, cardiothoracic surgery fellow.    Gabriela Matamoros MD PGY2  General Surgery[SS1.3]                     Revision History        User Key Date/Time User Provider Type Action    > SS1.3 12/17/2017  3:27 AM Gabriela Hoffman MD Resident Sign     SS1.2 12/17/2017  2:57 AM Gabriela Hoffman MD Resident      SS1.1 12/17/2017  2:47 AM Gabriela Hoffman MD Resident                      Progress Notes - Physician (Notes from 01/01/18 through 01/04/18)      Progress Notes by Florence Juan PA-C at 1/4/2018 11:05 AM     Author:  Florence Juan PA-C Service:  Cardiothoracic Surgery Author Type:  Physician Assistant    Filed:  1/4/2018 11:38 AM Date of Service:  1/4/2018 11:05 AM Creation Time:  1/4/2018 11:05 AM    Status:  Signed :  Florence Juan PA-C (Physician Assistant)         1/4/2018   CVTS Progress Note  Attending provider: Pili Bowers,*        S:  No acute issues over night. Patient denies  SOB, CP, fever, chills, sweats. Patient tolerating diet. Full assist, occupational therapy seeing daily.  + BM. No arrhythmias. Spent 45 minutes with patient and significant other Meghna today discussing discharge plan and that our goal is to be able to safely discharge patient. Therapies recommending TCU. Per last not by Neurology, patient should continue to improve with aggressive rehab. Meghna and patient upset because they just want to leave hospital today. She has concerns that patient is just laying around here and not getting any therapy. Per hospital chart, patient has been seen by occupational therapy daily except for New Years day. Meghna states she can take patient home and await acceptance into a rehab center and that she was taking care of him 24/7 at home prior to the surgery with home care. She reports that home care came daily except for Sunday's for 2 hours a day. I explained to patient that we  were having an issue with home care and the IV infusions and this was currently a barrier to their discharge to home today. Patient became very agitated at this point in the discussion and Meghna and I both decided to continue our discussion about placement after more information obtained from [facility]. I spoke with Angelina from [facility] and she is actively pursuing rehab placement which may become available today and will update patient and family. Family has agreed to rehab placement if suitable option becomes available.        O:   Vitals:    01/03/18 1809 01/04/18 0001 01/04/18 0827 01/04/18 1000   BP: (!) 120/98 122/79 108/83    BP Location:   Left arm    Pulse:    68   Resp: 18 18 18    Temp: 98.4  F (36.9  C) 97.7  F (36.5  C) 99.2  F (37.3  C)    TempSrc: Oral Oral Oral    SpO2: 98% 93% 94%    Weight:         Vitals:    12/30/17 0400 12/31/17 0500 01/02/18 0230   Weight: 74 kg (163 lb 2.3 oz) 73.9 kg (163 lb) 74.9 kg (165 lb 2 oz)       Intake/Output Summary (Last 24 hours) at 01/04/18 1105  Last data filed at 01/04/18 0400   Gross per 24 hour   Intake              480 ml   Output              100 ml   Net              380 ml       Gen: AxOx3, NAD  CV: RRR, no murmurs, rubs, or gallops  Pulm: CTA, no wheezing, no rhonchi  Abd: soft, NT, ND, +BS, +BM  Ext: no LE edema  Incision: c/d/i, no erythema, sternal stable  Tubes/drains: none   Labs:   BMP  Recent Labs  Lab 01/02/18  0701 01/01/18  0819 12/31/17  0705 12/30/17  0703    145* 141 142   POTASSIUM 4.1 4.0 3.5 3.7   CHLORIDE 109 110* 107 108   IZABELLA 9.0 9.4 9.3 9.3   CO2 26 25 25 24   BUN 27 27 28 34*   CR 0.94 0.84 0.86 0.93   * 102* 108* 121*     CBC  Recent Labs  Lab 01/02/18  0701 01/01/18  0819 12/31/17  0705 12/30/17  0703   WBC 11.5* 12.7* 11.9* 12.5*   RBC 3.05* 3.01* 2.85* 2.97*   HGB 9.3* 9.4* 8.8* 9.1*   HCT 31.4* 31.0* 29.6* 30.3*   * 103* 104* 102*   MCH 30.5 31.2 30.9 30.6   MCHC 29.6* 30.3* 29.7* 30.0*   RDW 19.1* 19.0* 18.7* 18.6*   PLT  497* 495* 458* 492*     INRNo lab results found in last 7 days.   Hepatic Panel   Lab Results   Component Value Date    AST 32 12/18/2017     Lab Results   Component Value Date    ALT 30 12/18/2017     Lab Results   Component Value Date    BILICONJ 0.0 10/25/2010      Lab Results   Component Value Date    BILITOTAL 1.3 12/18/2017     Lab Results   Component Value Date    ALBUMIN 1.5 12/18/2017     Lab Results   Component Value Date    PROTTOTAL 7.0 12/18/2017      Lab Results   Component Value Date    ALKPHOS 92 12/18/2017         Recent Labs  Lab 01/02/18  0701 01/01/18  0819 12/31/17  0705 12/30/17  0703 12/29/17  2150 12/29/17  1703 12/29/17  1429 12/29/17  0732 12/28/17  2115 12/28/17  1914 12/28/17  1109   * 102* 108* 121*  --   --   --  118*  --   --   --    BGM  --   --   --   --  120* 167* 127*  --  148* 163* 135*         Imaging: none today    ASSESSMENT: Cricket Miguel is a 62 year old male with hx of COPD, HTN, HLD, CAD, heart failure, aortic stenosis and ascending aortic aneurysm s/p aortic valve replacement and aortic root replacement (April 2016) c/b moises-aortic abscess, endocarditis, cerebral septic emboli and severe AI s/p redo-sternotomy aortic root and valve homograft 12/19. Post op EF 20-25%.        PLAN:   Neuro/ pain/ sedation: Hx of multifocal acute infarctions involving the left parietotemporal lobes, left cerebral hemisphere and right occipital lobe presumed to be septic emboli. Depression, mild delirium, followed by psych.  -Monitor neurological status. Notify the MD for any acute changes in exam.  -Dilaudid PRN.  -Mental status improving slowly   - On psych rec started olanzapine. Agitation improving. Serial EKGs to evaluate QT interval. QTc today 479. Psych recommended decreasing zyprexa given increased flat affect and delayed responses, however much more anxious 12/27 so dose kept at 5 mg BID. Doing better today with both anxiety and affect. Reduced to 2.5 mg BID 1/1.   - Neuro  recs fluoxetine for post-stroke recovery, Psych wants to hold off on any changes to psych medications just prior to discharge. They also recommend against fluoxetine while on fluconazole.      Pulmonary care: extubated 12/20  - On facemask 2 LPM saturation 100%.  - Encourage pulmonary toilet  - Nasotracheal suctioning  - Removed right chest tube 12/26   - Left pleural tube clamping trial okay, removed 12/27  - Vaseline to lips to avoid drying out.       Cardiovascular:    -Hx HTN, HLD, CAD, AS and aortic aneurysm s/p bentall c/b endocarditis and severe AI s/p aortic root/valve homograft 12/19.  -Keep SBP<120  - Losartan 25 mg daily, tolerating well.   - Coreg 6.25 mg bid since 1/1  - ASA 81 mg and atorvastatin.  - Echo shows EF 20-25% baseline 30%   - No bradycardia or pauses, had been in NSR, TPW removed 12/26.        GI care:   - PEG  - famotidine  - senna/colace/miralax, +BM, cdiff negative 12/26       Fluids/ Electrolytes/ Nutrition:   -PRN electrolyte replacement  -Bolus tube feeds through PEG, dajuan counts  - Change multivitamin as may be causing loose stool per Nutrition. Partner believes loose stools being caused by bolus feeding, this is less likely per Nutrition.   - Advanced to regular diet with thins today per speech.  Pt should be alert, upright, have 1:1 assist. Safe swallow strategies (written in room): chew on R (strong side), place straw in R corner of his mouth, encourage slow and small sips/bites.         Renal/ Fluid Balance:   - Espinoza catheter for strict intake and output.  - Monitor BMP, creatinine  - IV lasix transitioned to Bumex 2 mg PO BID on 12/26, evening dose held on 12/29 due to hypotension and lightheadedness. Stable Bumex 1 mg po bid since 12/30.        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 Hx of MSSA septic arthritis and bacteremia. Hx of multifocal acute infarctions involving the left parietotemporal lobes, left  cerebral hemisphere and right occipital lobe presumed to be septic emboli. ID following.   -New cultures BAL growing pseudomonas aeruginosa. ID following. Continue nafcillin/cefepime/rifampin per ID. WBC stable 11.5.    - Fluconazole for oral thrush (7 days) 12/30, d/c given risk of qt prolongation on 1/3      ID rec's from 12/26  - restart PTA nafcillin 2g IV q4h, previously planned duration until 1/7/18  - continue rifampin 300mg PO BID, previously planned duration until 1/7/18  -continue cefepime for 2 total weeks duration for pseudomonas pneumonia (end date 1/3/18)  -F/u surgical tissue cultures       Heme:   - Acute blood loss anemia  - Hgb stable.        Prophylaxis:    -Mechanical prophylaxis for DVT.   -Heparin TID.       Lines/ tubes/ drains:  -R triple lumen PICC, garza, PIVs       Disposition:  -Floor since 12/23   -Recommending discharge to TCU, may have placement today. Please see discussion with family above.       Discussed with CVTS Fellow   Staff surgeons have been informed of changes through both  verbal and written communication.       Florence Chau PA-C  Cardiothoracic Surgery   Pager [phone]  January 4, 2018[AS1.1]       Revision History        User Key Date/Time User Provider Type Action    > AS1.1 1/4/2018 11:38 AM Florence Juan PA-C Physician Assistant Sign            Progress Notes by Angelina Calabrese MSW at 1/3/2018  4:45 PM     Author:  Angelina Calabrese MSW Service:  Social Work Author Type:      Filed:  1/4/2018  7:52 AM Date of Service:  1/3/2018  4:45 PM Creation Time:  1/4/2018  7:42 AM    Status:  Addendum :  Angelina Calabrese MSW ()         Social Work Services Progress Note    Hospital Day: 1[LH1.1]8[LH1.2]  Date of Initial Social Work Evaluation:  12/26/17  Collaborated with: Meghna (SO) Eloy (daughter), CVTS team, SNF admissions: [facility], [facility], Psychiatry    Data:  Pt is a 62 year old male being  followed by [facility] for placement to rehab.  SW has been getting denials for pt due to pt's cognition, medical needs and rehab potential.  New referrals were started to [facility] and [facility]    Intervention:  [facility] met with pt's daughter Eloy in person.  Eloy today is very upset that the pt is being discharged to home and cannot get into [facility].  Eloy stating that the information she received from Meghna was different from the information that [facility] had relayed to Meghna.  [facility] explained [facility] denial again to Eloy.  [facility] stated that if the pt is wanting to get rehab the family will have to choose another location.  Eloy is willing to make alternate referrals in the area, Meghna has strong opinions about many facilities not being a fit for the pt.  SO Meghna toured [facility] and states to writer that admissions agreed to accept the pt for Saturday.  Meghna also stating the pt can discharge to home today and [facility] will take him in Saturday.  [facility] stated they wanted to verify this with the facility as this is not a usual discharge plan.  [facility] made several calls to [facility] asking if this was plan was placed with family.  Writer received a call and voicemail that yes this was the plan, pending a second review by the facility.  Admissions team also stating in message that if the pt did discharge to home, he would have to get new orders from a PCP prior to admitting.  To writer this appears to be incredibly difficult for family as the next bed open at [facility] is on Saturday and it would be difficult to get PCP orders on a Saturday.  In the voicemail SW also did not get a guarantee that the Saturday opening bed is being held for this family.  SW left messages with admissions to confirm that 1) the bed is being held and 2) the facility has reviewed and accepted the pt for admission.    [facility] requested a capacity screening by the psychiatry team as the pt is adamant about going home and there appears  to be strong family dynamics between VERONICA Coffman and daughter Eloy.  Eloy understands that by [facility] policy she is NOK if the pt is not having capacity.  Psychiatry met with pt and family and has placed a consult note regarding pt's ability to participate in discharge planning.      If the pt is not fully accepted by [facility] as of tomorrow morning, will recommend to the family that another facility is chosen for referral as the bed at [facility] is not guaranteed.    Assessment:  Pt awake and able to participate in conversations.  Pt very frustrated he is not going home today.    Plan:    Anticipated Disposition:  Facility:  TCU - spouse toured more facilities and states that [facility] is her choice.     Barriers to d/c plan: Bed availability at facilities, SO has very limited choices for referrals.    Follow Up: SW and RNCC to follow for discharge planning today.    [facility] [place], [facility]  6[address] Unit   Phone: [phone]  Pager: [phone]  Unit: [phone][LH1.1]             Revision History        User Key Date/Time User Provider Type Action    > [N/A] 1/4/2018  7:52 AM Angelina Calabrese MSW  Addend     [N/A] 1/4/2018  7:52 AM Angelina Calabrese MSW  Addend     LH1.2 1/4/2018  7:52 AM Angelina Calabrese MSW  Sign     LH1.1 1/4/2018  7:42 AM Angelina Calabrese MSW              Progress Notes by Eva Allen MD at 1/4/2018  3:25 AM     Author:  Eva Allen MD Service:  Surgery Author Type:  Resident    Filed:  1/4/2018  3:26 AM Date of Service:  1/4/2018  3:25 AM Creation Time:  1/4/2018  3:25 AM    Status:  Signed :  Eva Allen MD (Resident)         Paged per nursing for 16 beats of vtach at 2:45 patient was asymptomatic at the time. Sinus rhythm now    - No interventions    Eva Root,PGY-1[SE1.1]      Revision History        User Key Date/Time User Provider Type Action    >  SE1.1 1/4/2018  3:26 AM Eva Allen MD Resident Sign            Progress Notes by Reyna Dutton RD at 1/3/2018  1:25 PM     Author:  Reyna Dutton RD Service:  Nutrition Author Type:  Registered Dietitian    Filed:  1/3/2018  3:00 PM Date of Service:  1/3/2018  1:25 PM Creation Time:  1/3/2018  1:25 PM    Status:  Signed :  Reyna Dutton RD (Registered Dietitian)         CLINICAL NUTRITION SERVICES - REASSESSMENT NOTE     Nutrition Prescription    RECOMMENDATIONS FOR MDs/PROVIDERS TO ORDER:  1. Adjust TFs if needed, pending kcal counts. Once consuming 1430 kcals and 70 g protein daily, then discontinue TFs.   2. Adjust free water flushes if needed, pending fluid status (free water flushes are currently for patency only).     Malnutrition Status:    Non-severe malnutrition in the context of chronic illness    Recommendations already ordered by Registered Dietitian (RD):  Ordered kcal counts 1/4-1/6    Future/Additional Recommendations:  1. Diet as per SLP.   2. Continue to offer oral nutrition supplements.   2. Rec thiamine (100 mg/day) if pt has NYHA Class III-IV heart failure.     3. Continue certavite as ordered to help meet micronutrient needs.  4. Consider checking folic acid and vitamin B12.     EVALUATION OF THE PROGRESS TOWARD GOALS   Diet: DD1 Pureed diet w/ thin liquids. On a 2 L fluid restriction. Advanced from full liquid diet on 12/28. He had been on a regular diet 12/18-12/19. Has oral nutrition supplement order for Gelatein at 14:00, in addition to prn oral supplement order.  Intake: Appetite has been fair per nursing and pt's daughter. Nursing flowsheets indicate pt consuming % of 2 meals 12/29, 75% of 1 meal 12/30, 25-50% of 3 meals 12/31, 25-50% of 2 meals 1/1/18, 75% of 2 meals 1/2.   - Calorie count from 12/29 documents 0 kcals, 0 g protein w/ no supplements and 1 meal ordered. (This does not correspond with the flowsheet per nursing noted above,  documenting intake of % of two meals ordered)   - Calorie count from 12/30 documents 745 kcals, 49 g protein from 1 meal and 1 oral nutrition supplement.     Nutrition Support:    - Feeding Tube (FT) access: PEG placed Oct 2017  - TF regimen: Changed TFs to bolus, supplemental to help encourage oral intake. New TF regimen: TwoCal HN, 2 cans daily bolused at 10:00 and 16:00 or timing per pt preference, provides 474 mL TF, 948 kcals, 40 g protein, 332 mL H2O, 104 g CHO, and 2 g fiber daily. Prosource TF modular ordered, 1 pkt TID. Each packet of ProSource TF modular provides 40 kcals and 11 g protein.   - Feeding tube flush: Ordered 60 mL before and after TFs boluses   - Intake:  Spoke with nurse and per chart notes, pt has been receiving full TF's since 12/28, apart from 1 missed feeding today 1/3 at 10 am due to possible discharge plans. Pts I/O flowsheet although does not indicate documentation of bolus feedings on 12/29, 12/31, and 12/1. I do not suspect missed enteral feedings after speaking with the nurse and reviewing notes.[AM1.1] Pt received goal Prosource (3 pkts daily) past 5 days except one day (received 2 pkts).[TB1.1]     Unable to calculate a five-day average of total nutrition intake due to limited calorie count documentation and inconsistent flowsheet intake information.      NEW FINDINGS   Wt hx: 81.7 kg (12/26/17), 74.5 kg (12/29/17), 74 kg (12/30/17), 74.9 kg (1/2/18) - Pt has lost 11% of his body wt over the past 11 days (21lbs). Has been diuresed this admission and do not suspect true weight loss.     ASSESSED NUTRITION NEEDS (updated 1/3/18):  Dosing Weight: 74 kg (based on lowest wt this admission of 74 kg on 12/30)  Estimated Energy Needs: 7198-6785 kcals/day (25 - 30 kcals/kg)  Justification: Maintenance  Estimated Protein Needs:  grams protein/day (1.2 - 1.5 grams of pro/kg)  Justification: Hypercatabolism with acute illness and Repletion  Estimated Fluid Needs: 2000  mL/day  Justification: On a fluid restriction    MALNUTRITION  % Intake: Not meeting this criteria.     % Weight Loss: Weight loss does not meet criteria. Difficult to assess with diuresis    Subcutaneous Fat Loss: Upper arm: and Lower arm: mild. Pt was busy with nursing cares. Per pt's wife, has noticed generalized muscle loss.  Muscle Loss: Temporal: Moderate. Upper arm (bicep, tricep), Lower arm  (forearm)  and Upper leg (quadricep, hamstring): Severe. Shoulder and Clavicle: Mild to Moderate   Fluid Accumulation/Edema: Does not meet criteria  Malnutrition Diagnosis: Non-severe malnutrition in the context of chronic illness    Previous Goals   Total average nutrition intake to meet 5849-2428 kcals/day (25 - 30 kcals/kg) and  grams protein/day (1.2 - 1.5 grams of pro/kg).  Evaluation: Not met. Unable to evaluate due to inconsistent I/O documentation in flowsheet.     Previous Nutrition Diagnosis  Inadequate oral intake related to recent diet advancement as evidenced by pt consuming 25% of meals at times.   Evaluation: Resolved. Changed to new nutrition dx below.     CURRENT NUTRITION DIAGNOSIS  Inadequate oral intake related to fair appetite and poor chewing ability 2/2 stroke as evidenced by consuming % of meals BID, with tube feeding meeting ~50% of estimated needs.    INTERVENTIONS  Implementation  Collaboration with other providers: Per discussion with team pt will stay on a pureed diet upon d/c and focus on heart healthy choices.  Ordered kcal counts 1/4 - 1/6.   Nutrition Education: Provided education on Heart Healthy Diet handout and DD1 Pureed foods - ED given earlier today, see other Nutrition Progress Note and See education flowsheet.    Goals  Total average nutrition intake to meet 1593-0486 kcals/day (25 - 30 kcals/kg) and  grams protein/day (1.2 - 1.5 grams of pro/kg).    Monitoring/Evaluation  Progress toward goals will be monitored and evaluated per protocol.   Nutrition will  continue to follow.      Florence Brennan Dietetic Intern[AM1.1]    I have read and agree with the above assessment, evaluation, interventions and recommendations.    Reyna Dutton RD, LD ([phone])[TB1.1]       Revision History        User Key Date/Time User Provider Type Action    > TB1.1 1/3/2018  3:00 PM Reyna Dutton RD Registered Dietitian Sign     AM1.1 1/3/2018  2:51 PM Florence Brennan Dietetic Intern Pend            Progress Notes by Benny Unger PA-C at 1/3/2018 11:14 AM     Author:  Benny Unger PA-C Service:  Cardiothoracic Surgery Author Type:  Physician Assistant    Filed:  1/3/2018  2:52 PM Date of Service:  1/3/2018 11:14 AM Creation Time:  1/3/2018 11:14 AM    Status:  Signed :  Benny Unger PA-C (Physician Assistant)         CVTS Progress Note  Attending provider: Pili Bowers,*        S:  No acute issues over night. Resting comfortably in bed.  Denies any problems today, denies SOB, CP, lightheadedness, dizziness.   +BM, no arrhthymias.     O:   Vitals:    01/02/18 1604 01/02/18 2004 01/03/18 0006 01/03/18 0810   BP: 125/86 112/86 120/69 113/88   BP Location: Left arm Left arm Left arm Left arm   Pulse:       Resp: 16 16 18 18   Temp: 97.5  F (36.4  C) 97.5  F (36.4  C) 97.8  F (36.6  C) 98.4  F (36.9  C)   TempSrc: Axillary Axillary Axillary Oral   SpO2: 94% 98% 95% 97%   Weight:         Vitals:    12/30/17 0400 12/31/17 0500 01/02/18 0230   Weight: 74 kg (163 lb 2.3 oz) 73.9 kg (163 lb) 74.9 kg (165 lb 2 oz)[AD1.1]     Intake/Output Summary (Last 24 hours) at 01/03/18 1117  Last data filed at 01/03/18 0907   Gross per 24 hour   Intake             1740 ml   Output                0 ml   Net             1740 ml[AD1.2]     Gen: AxOx3, NAD  CV: RRR, no murmurs, rubs, or gallops  Pulm: CTA, no wheezing, no rhonchi  Abd: soft, NT, ND, +BS, +BM  Ext: no LE edema  Incision: c/d/i, no erythema, sternal stable  Tubes/drains: none     Labs:   BMP    Recent  Labs  Lab 01/02/18  0701 01/01/18  0819 12/31/17  0705 12/30/17  0703    145* 141 142   POTASSIUM 4.1 4.0 3.5 3.7   CHLORIDE 109 110* 107 108   IZABELLA 9.0 9.4 9.3 9.3   CO2 26 25 25 24   BUN 27 27 28 34*   CR 0.94 0.84 0.86 0.93   * 102* 108* 121*     CBC    Recent Labs  Lab 01/02/18  0701 01/01/18  0819 12/31/17  0705 12/30/17  0703   WBC 11.5* 12.7* 11.9* 12.5*   RBC 3.05* 3.01* 2.85* 2.97*   HGB 9.3* 9.4* 8.8* 9.1*   HCT 31.4* 31.0* 29.6* 30.3*   * 103* 104* 102*   MCH 30.5 31.2 30.9 30.6   MCHC 29.6* 30.3* 29.7* 30.0*   RDW 19.1* 19.0* 18.7* 18.6*   * 495* 458* 492*     INRNo lab results found in last 7 days.   Hepatic Panel   Lab Results   Component Value Date    AST 32 12/18/2017     Lab Results   Component Value Date    ALT 30 12/18/2017     Lab Results   Component Value Date    BILICONJ 0.0 10/25/2010      Lab Results   Component Value Date    BILITOTAL 1.3 12/18/2017     Lab Results   Component Value Date    ALBUMIN 1.5 12/18/2017     Lab Results   Component Value Date    PROTTOTAL 7.0 12/18/2017      Lab Results   Component Value Date    ALKPHOS 92 12/18/2017         Recent Labs  Lab 01/02/18  0701 01/01/18  0819 12/31/17  0705 12/30/17  0703 12/29/17  2150 12/29/17  1703 12/29/17  1429 12/29/17  0732 12/28/17  2115 12/28/17  1914 12/28/17  1109 12/28/17  0727   * 102* 108* 121*  --   --   --  118*  --   --   --  116*   BGM  --   --   --   --  120* 167* 127*  --  148* 163* 135*  --      Imaging: none     ASSESSMENT: Cricket Miguel is a 62 year old male with hx of COPD, HTN, HLD, CAD, heart failure, aortic stenosis and ascending aortic aneurysm s/p aortic valve replacement and aortic root replacement (April 2016) c/b moises-aortic abscess, endocarditis, cerebral septic emboli and severe AI s/p redo-sternotomy aortic root and valve homograft 12/19. Post op EF 20-25%.        PLAN:   Neuro/ pain/ sedation: Hx of multifocal acute infarctions involving the left parietotemporal  lobes, left cerebral hemisphere and right occipital lobe presumed to be septic emboli. Depression, mild delirium, followed by psych.  -Monitor neurological status. Notify the MD for any acute changes in exam.  -Dilaudid PRN.  -Mental status improving slowly   - On psych rec started olanzapine. Agitation improving. Serial EKGs to evaluate QT interval. QTc today 479. Psych recommended decreasing zyprexa given increased flat affect and delayed responses, however much more anxious 12/27 so dose kept at 5 mg BID. Doing better today with both anxiety and affect. Reduced to 2.5 mg BID 1/1.   - Neuro recs fluoxetine for post-stroke recovery, Psych wants to hold off on any changes to psych medications just prior to discharge. They also recommend against fluoxetine while on fluconazole.     Pulmonary care: extubated 12/20  - On facemask 2 LPM saturation 100%.  - Encourage pulmonary toilet  - Nasotracheal suctioning  - Removed right chest tube 12/26   - Left pleural tube clamping trial okay, removed 12/27  - Vaseline to lips to avoid drying out.       Cardiovascular:    -Hx HTN, HLD, CAD, AS and aortic aneurysm s/p bentall c/b endocarditis and severe AI s/p aortic root/valve homograft 12/19.  -Keep SBP<120  - Cardiology recently started losartan 25 mg po bid. Pressure a little soft and patient reported being a little lightheaded. Gave 100 ml fluid back with improved BP. May need to reduce Losartan dosing if not tolerating but patient appeared to be dry this AM.    - Coreg 6.25 mg bid since 1/1  - ASA 81 mg and atorvastatin.  - Echo shows EF 20-25% baseline 30%   - No bradycardia or pauses, had been in NSR, TPW removed 12/26.        GI care:   - PEG  - famotidine  - senna/colace/miralax, +BM, cdiff negative 12/26       Fluids/ Electrolytes/ Nutrition:   -PRN electrolyte replacement  -Bolus tube feeds through PEG, dajuan counts       Renal/ Fluid Balance:   - Espinoza catheter for strict intake and output.  - Monitor BMP,  creatinine  - IV lasix transitioned to Bumex 2 mg PO BID on 12/26, evening dose held on 12/29 due to hypotension and lightheadedness. Stable Bumex 1 mg po bid since 12/30.        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 Hx of MSSA septic arthritis and bacteremia. Hx of multifocal acute infarctions involving the left parietotemporal lobes, left cerebral hemisphere and right occipital lobe presumed to be septic emboli. ID following.   -New cultures BAL growing pseudomonas aeruginosa. ID following. Continue nafcillin/cefepime/rifampin per ID. WBC stable.    - Fluconazole for oral thrush (7 days) 12/30, d/c given risk of qt prolongation      ID rec's from 12/26  - restart PTA nafcillin 2g IV q4h, previously planned duration until 1/7/18  - continue rifampin 300mg PO BID, previously planned duration until 1/7/18  -continue cefepime for 2 total weeks duration for pseudomonas pneumonia (end date 1/3/18)  -F/u surgical tissue cultures       Heme:   - Acute blood loss anemia  - Hgb stable.        Prophylaxis:    -Mechanical prophylaxis for DVT.   -Heparin TID.       Lines/ tubes/ drains:  -R triple lumen PICC, Mary garza       Disposition:  -Floor since 12/23   -Recommending discharge to TCU, however, family would like to take patient home as the facility if there will be a short period of time between going home and going to rehab. Family today not feeling it is safe for patient to go home given it[AD1.1] may be a few[AD1.3] weeks before patient has placement[AD1.1], which I agree with[AD1.4]. Will keep[AD1.3] and discharge when placement is confirmed[AD1.4].[AD1.3]       Discussed with CVTS Fellow   Staff surgeons have been informed of changes through both  verbal and written communication.      Benny Unger PA-C  Cardiothoracic Surgery  January 3, 2018 11:19 AM   p: [phone][AD1.1]         Revision History        User Key Date/Time User Provider  Type Action    > AD1.4 1/3/2018  2:52 PM Benny Unger PA-C Physician Assistant Sign     AD1.3 1/3/2018  2:26 PM Benny Unger PA-C Physician Assistant      AD1.2 1/3/2018 11:17 AM Benny Unger PA-C Physician Assistant      AD1.1 1/3/2018 11:14 AM Benny Unger PA-C Physician Assistant             Progress Notes by Carole Beckham RN at 1/2/2018  4:29 PM     Author:  Carole Beckham RN Service:  (none) Author Type:  Care Coordinator    Filed:  1/3/2018  2:45 PM Date of Service:  1/2/2018  4:29 PM Creation Time:  1/2/2018  4:29 PM    Status:  Addendum :  Carole Beckham RN (Care Coordinator)           Care Coordinator- Discharge Planning     Admission Date/Time:  12/16/2017  Attending MD:  Pili Bowers   Data  Date of initial CC assessment:  12/21/17  Chart reviewed, discussed with interdisciplinary team.   Patient was admitted for:   1. Acute bacterial endocarditis    2. 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    Assessment  Full assessment completed in previous note   Concerns with insurance coverage for discharge needs: None.  Current Living Situation: Patient lives with significant other.  Support System: Significant other and [facility] and Involved  Services Involved: Home Infusion ([facility] # [phone]) Home care ([facility] care # [phone], RN 1x/wk, PT daily, OT daily and SP 2X/wk).  Transportation:[PF1.1] [PF1.2].     Coordination of Care and Referrals: Provided patient/family with options for DME, home care, home infusion.   Per SW, pt was declined from the TCU of pt's choice so pt w[PF1.1]ants to[PF1.3] discharge to home with[PF1.1] resumption of[PF1.3] home care[PF1.1] fr[PF1.4]o[PF1.3]m [facility][PF1.4].[PF1.1] [PF1.2]   Pt's SO, Meghna said that pt's hospital bed and shelia lift w[PF1.1]ere[PF1.3] returned to [facility] upon pt's admission and Meghna is requesting to have these reordered.[PF1.1] Pt already  has a w/c for home use and his ride home.[PF1.3]   Per PA, pt will need home IV abx and tube feedings arranged; pt wants to use [facility].   Intervention:   Arrangements made with [facility] (Ph: [phone] Fax: [phone])[PF1.4] for home IV abx and tube feeding supplies.      Arrangements made with [facility] (Ph: [phone] Fax[PF1.5]:[PF1.3] [phone]) for RN eval post hospitalization, assess vital signs, respiratory and cardiac status, activity tolerance, hydration, nutritional status, med set up and management. Speech Therapy eval and treat. Dietary consult. HHA for assist with ADL's. PT/OT eval and treat for deconditioning, strengthening, transfers, and endurance.      Script faxed to [employer] [place] ([phone] Fax: [phone]) for hospital bed and shelia lift. Awaiting insurance approval and delivery schedule.[PF1.5]     Plan  Anticipated Discharge Date:[PF1.1]   Anticipated Discharge Plan:[PF1.1]  Discharge to home with home care.[PF1.3]     MARCIA RAMOS RN[PF1.1] BSN  Care Coordinator  [phone][PF1.3]  ADDENDUM: 1/3/18  D: Per SW, pt's daughter now wants pt to go to a TCU and is willing to have pt placed in an available facility.   I: [facility] ride home cancelled and [facility] notified of pt's delay in discharge.[PF1.2]      Revision History        User Key Date/Time User Provider Type Action    > PF1.2 1/3/2018  2:45 PM Marcia Ramos RN Care Coordinator Addend     PF1.3 1/2/2018  5:53 PM Marcia Ramos RN Care Coordinator Sign     PF1.5 1/2/2018  5:34 PM Marcia Ramos RN Care Coordinator      PF1.4 1/2/2018  4:36 PM Marcia Ramos RN Care Coordinator      PF1.1 1/2/2018  4:29 PM Flood, Marcia J, RN Care Coordinator             Progress Notes by Reyna Dutton RD at 1/3/2018 10:52 AM     Author:  Reyna Dutton RD Service:  Nutrition Author Type:  Registered Dietitian    Filed:  1/3/2018 11:04 AM Date of Service:  1/3/2018 10:52  AM Creation Time:  1/3/2018 10:52 AM    Status:  Signed :  Reyna Dutton RD (Registered Dietitian)         CLINICAL NUTRITION SERVICES    Reason for Assessment:  Heart-healthy nutrition education, received consult.    Diet History:  Pt reports history of receiving heart-healthy nutrition education in the past, but wanted a refresher.      Nutrition Diagnosis:  Food- and nutrition-related knowledge deficit r/t limited previous knowledge of heart-healthy diet AEB pt and family report of limited previous formal heart-healthy nutrition education.    Nutrition Prescription/Recs:  Continue heart-healthy diet.  Continue DD1 Pureed diet with thin liquids per care team.     Interventions:  Nutrition Education  1.  Provided verbal instruction on a heart-healthy diet.  2.  Provided handouts:  Heart Health Guidelines (includes Heart Healthy Food Choices), Your Heart-Healthy Diet (Words You Will Hear), 10 Tips for Heart-Healthy Cooking, Dining Out With Your Heart In Mind, Recipe Changes for Heart-Healthy Cooking,  How to Read Nutrition Labels, Low-Sodium Foods and Drinks, Tips for a Low-Sodium Diet, and Seasoning Your Foods Without Adding Salt, National Dysphagia Diet Pureed Nutrition Therapy     Goals:    1.  Pt and family will verbalize at least three foods high in saturated or trans-fats and three foods high in sodium.    2.  Pt and family will list at least two interventions to make current meal plan more heart-healthy.     Follow-up:   Patient to ask any further nutrition-related questions before discharge.  In addition, pt may request outpatient RD appointment.    Florence Brennan, Dietetic Intern[AM1.1]    I have read and agree with the above assessment, evaluation, interventions and recommendations.    Reyna Dutton RD, LD ([facility] [phone])[TB1.1]       Revision History        User Key Date/Time User Provider Type Action    > TB1.1 1/3/2018 11:04 AM Reyna Dutton RD Registered Dietitian Sign     AM1.1  1/3/2018 11:00 AM Florence Brennan Dietsalina Intern Sign            Progress Notes by Pardeep Cruz MD at 1/2/2018  3:29 PM     Author:  Pardeep Cruz MD Service:  Cardiology Author Type:  Resident    Filed:  1/2/2018  6:38 PM Date of Service:  1/2/2018  3:29 PM Creation Time:  1/2/2018  3:29 PM    Status:  Signed :  Pardeep Cruz MD (Resident)         [facility]  CARDIOLOGY DAILY PROGRESS NOTE    Interval History:[VM1.1]   No acute hemodynamic issues  No chest pain or dyspnea  Discharging tomorrow[VM1.2]    Objective[VM1.1]  Temp:  [96.3  F (35.7  C)-97.9  F (36.6  C)] 97.6  F (36.4  C)  Heart Rate:  [74-95] 74  Resp:  [16-18] 16  BP: (115-124)/(74-90) 115/76  SpO2:  [94 %-98 %] 98 %[VM1.3]   Room air[VM1.1]     I/O last 3 completed shifts:  In: 1230 [P.O.:480; I.V.:140; NG/GT:370]  Out: 1600 [Urine:1600]     Wt Readings from Last 5 Encounters:   01/02/18 74.9 kg (165 lb 2 oz)   12/15/17 83 kg (183 lb)   11/02/17 88.3 kg (194 lb 10.7 oz)   02/06/17 91.6 kg (202 lb)   01/09/17 87.5 kg (193 lb)[VM1.3]     GEN: no acute distress  HEENT: no icterus  CV: RRR, normal s1/s2, no murmurs/rubs/s3/s4, no heave. JVP at[VM1.1] 7[VM1.2] cm.   CHEST: on room air, normal work of breathing, clear to ausculation bilaterally, no rales or wheezing  ABD: soft, non-tender, normal active bowel sounds  EXTR: pulses 2+ and equal. No clubbing, cyanosis.  Trace lower extremity edema.   NEURO: slight dysarthria, left facial droop, disoriented occasionally    Current Medications[VM1.1]    carvedilol  6.25 mg Oral BID w/meals     OLANZapine  2.5 mg Oral BID     White Petrolatum   Topical TID     fluconazole  300 mg Oral or G tube Daily     bumetanide  1 mg Oral BID     potassium chloride  20 mEq Oral BID     protein modular  1 packet Per Feeding Tube TID     famotidine  20 mg Oral BID     nafcillin  2 g Intravenous Q4H     ceFEPIme (MAXIPIME) IV  2 g Intravenous Q8H     losartan  25 mg Oral  Q12H KILO     polyethylene glycol  17 g Oral Daily     heparin  5,000 Units Subcutaneous Q8H     multivitamins with minerals  15 mL Per Feeding Tube Daily     sodium chloride (PF)  3 mL Intracatheter Q8H     senna-docusate  1-2 tablet Oral BID     mirtazapine  30 mg Oral At Bedtime     rifampin  300 mg Oral or Feeding Tube BID     ketotifen  1 drop Left Eye TID     artificial tears   Ophthalmic At Bedtime     aspirin  81 mg Oral or Feeding Tube Daily     atorvastatin  40 mg Oral Daily       IV fluid REPLACEMENT ONLY       IV fluid REPLACEMENT ONLY       Reason beta blocker order not selected       - MEDICATION INSTRUCTIONS -[VM1.3]       Lab Values[VM1.1]    Recent Labs  Lab 01/02/18  0701 01/01/18  0819 12/31/17  0705 12/30/17  0703    145* 141 142   POTASSIUM 4.1 4.0 3.5 3.7   CHLORIDE 109 110* 107 108   IZABELLA 9.0 9.4 9.3 9.3   CO2 26 25 25 24   BUN 27 27 28 34*   CR 0.94 0.84 0.86 0.93   * 102* 108* 121*       Recent Labs  Lab 01/02/18  0701 01/01/18  0819 12/31/17  0705 12/30/17  0703   WBC 11.5* 12.7* 11.9* 12.5*   RBC 3.05* 3.01* 2.85* 2.97*   HGB 9.3* 9.4* 8.8* 9.1*   HCT 31.4* 31.0* 29.6* 30.3*   * 103* 104* 102*   MCH 30.5 31.2 30.9 30.6   MCHC 29.6* 30.3* 29.7* 30.0*   RDW 19.1* 19.0* 18.7* 18.6*   * 495* 458* 492*[VM1.3]     Studies  EKG: low voltage, sinus rhythm     Transthoracic echocardiogram:   Left ventricular wall thickness is normal. Mild left ventricular dilation is present. The Ejection Fraction is estimated at 30-35%. Diastolic function not assessed due to tachycardia. Severe diffuse hypokinesis is present.     The right ventricle is normal size. Global right ventricular function is mildly reduced.  Both atria appear normal.  Trace mitral insufficiency is present.  The tricuspid valve is normal.     Vessels  The patient is post bio-Bentall - aortic root replacement with a 30 mm graft and AVR with a Trifecta valve. Today, there is circumferential free space noted  surrounding the aortic graft, dehiscence and rocking of the graft from the native aorta and severe AI in the space surrounding the aortic graft (perivalvular/perigraft). The circumferential free space was present on the  previous studies of October 2017 but was filled with echodense material and the graft was not independently mobile, and there was no significant AI. The prosthetic aortic valve leaflets are not well visualized today.     No pericardial effusion is present.     Compared to Previous Study  This study was compared with the study from 10/06/2017 . Since the prior study, the LV function has worsened and the aortic graft appears to be dehisced, as described above.    CXR  Impression:   1. Small left pleural effusion and associated retrocardiac subsegmental atelectasis and/or consolidation.  2. Stable postsurgical changes and supporting devices.   3. Stable pulmonary vascular congestion.      Assessment and Recommendation:  Cricket Miguel is a 62 year old male admitted with worsening dyspnea, thought to have acute severe AI on echocardiogram.  Pertinent history of aortic stenosis and ascending aortic aneurysm s/p AVR and root replacement April 2016.  Had complications of moises aortic abscess, endocarditis, and septic emboli to his brain.  He underwent redo sternotomy with aortic root and valve homograft 12/19.  Heart failure service is being consulted for newly reduced EF 20-25%.  Most likely related to  severe AI and recent surgery, and should improve over time.  Does not appear ischemic.     On losartan 25mg BID[VM1.1],[VM1.4]  coreg 6.25 BID[VM1.1].  Would up titrate medications as BP tolerates[VM1.2]  Bumex 1 mg PO BID.    Continue lipitor and asa 81.[VM1.1]  Can follow up with Dr. Garzon as outpatient (has seen him in past).  Otherwise patient can follow up in [facility] as well.  Repeat echo in 2 months[VM1.2]    I have discussed the above with Dr. Crook    Thank you for consulting the  cardiovascular services at the [facility]. Please do not hesitate to call us with any questions.      Pardeep Cruz MD PGY4  Cardiology Fellow  [phone][VM1.1]       Revision History        User Key Date/Time User Provider Type Action    > VM1.2 1/2/2018  6:38 PM Pardeep Cruz MD Resident Sign     VM1.4 1/2/2018  4:10 PM Pardeep Cruz MD Resident      VM1.3 1/2/2018  3:30 PM Pardeep Cruz MD Resident      VM1.1 1/2/2018  3:29 PM Pardeep Cruz MD Resident             Progress Notes by Florence Juan PA-C at 1/2/2018  3:07 PM     Author:  Florence Juan PA-C Service:  Cardiothoracic Surgery Author Type:  Physician Assistant    Filed:  1/2/2018  3:57 PM Date of Service:  1/2/2018  3:07 PM Creation Time:  1/2/2018  3:07 PM    Status:  Signed :  Florence Juan PA-C (Physician Assistant)         1/2/2018   CVTS Progress Note  Attending provider: Pili Bowers,*        S:  No acute issues over night.[AS1.1] Resting comfortably in bed. Wife did not want me to wake him.[AS1.2]      O:   Vitals:    01/01/18 2242 01/02/18 0230 01/02/18 0832 01/02/18 1221   BP: 121/74 119/74 117/83 115/76   BP Location: Left arm Left arm  Left arm   Pulse:       Resp: 16 16 16 16   Temp: 97.8  F (36.6  C) 97.6  F (36.4  C) 96.3  F (35.7  C) 97.6  F (36.4  C)   TempSrc: Axillary Axillary Axillary Oral   SpO2: 96% 94% 97% 98%   Weight:  74.9 kg (165 lb 2 oz)       Vitals:    12/30/17 0400 12/31/17 0500 01/02/18 0230   Weight: 74 kg (163 lb 2.3 oz) 73.9 kg (163 lb) 74.9 kg (165 lb 2 oz)       Intake/Output Summary (Last 24 hours) at 01/02/18 1507  Last data filed at 01/02/18 1100   Gross per 24 hour   Intake             1230 ml   Output             1600 ml   Net             -370 ml       Gen: AxOx3, NAD  CV: RRR, no murmurs, rubs, or gallops  Pulm: CTA,[AS1.1] no[AS1.2] wheezing,[AS1.1] no[AS1.2] rhonchi  Abd: soft, NT, ND, +BS,[AS1.1] +[AS1.2]BM  Ext:[AS1.1]  no[AS1.2] LE edema  Incision: c/d/i, no erythema, sternal stable  Tubes/drains:[AS1.1] none[AS1.2]     Labs:   BMP  Recent Labs  Lab 01/02/18  0701 01/01/18  0819 12/31/17  0705 12/30/17  0703    145* 141 142   POTASSIUM 4.1 4.0 3.5 3.7   CHLORIDE 109 110* 107 108   IZABELLA 9.0 9.4 9.3 9.3   CO2 26 25 25 24   BUN 27 27 28 34*   CR 0.94 0.84 0.86 0.93   * 102* 108* 121*     CBC  Recent Labs  Lab 01/02/18  0701 01/01/18  0819 12/31/17  0705 12/30/17  0703   WBC 11.5* 12.7* 11.9* 12.5*   RBC 3.05* 3.01* 2.85* 2.97*   HGB 9.3* 9.4* 8.8* 9.1*   HCT 31.4* 31.0* 29.6* 30.3*   * 103* 104* 102*   MCH 30.5 31.2 30.9 30.6   MCHC 29.6* 30.3* 29.7* 30.0*   RDW 19.1* 19.0* 18.7* 18.6*   * 495* 458* 492*     INRNo lab results found in last 7 days.   Hepatic Panel   Lab Results   Component Value Date    AST 32 12/18/2017     Lab Results   Component Value Date    ALT 30 12/18/2017     Lab Results   Component Value Date    BILICONJ 0.0 10/25/2010      Lab Results   Component Value Date    BILITOTAL 1.3 12/18/2017     Lab Results   Component Value Date    ALBUMIN 1.5 12/18/2017     Lab Results   Component Value Date    PROTTOTAL 7.0 12/18/2017      Lab Results   Component Value Date    ALKPHOS 92 12/18/2017         Recent Labs  Lab 01/02/18  0701 01/01/18  0819 12/31/17  0705 12/30/17  0703 12/29/17  2150 12/29/17  1703 12/29/17  1429 12/29/17  0732 12/28/17  2115 12/28/17  1914 12/28/17  1109 12/28/17  0727   * 102* 108* 121*  --   --   --  118*  --   --   --  116*   BGM  --   --   --   --  120* 167* 127*  --  148* 163* 135*  --          Imaging:[AS1.1] none[AS1.2]     ASSESSMENT: Cricket Miguel is a 62 year old male with hx of COPD, HTN, HLD, CAD, heart failure, aortic stenosis and ascending aortic aneurysm s/p aortic valve replacement and aortic root replacement (April 2016) c/b moises-aortic abscess, endocarditis, cerebral septic emboli and severe AI s/p redo-sternotomy aortic root and valve homograft  12/19. Post op EF 20-25%.        PLAN:   Neuro/ pain/ sedation: Hx of multifocal acute infarctions involving the left parietotemporal lobes, left cerebral hemisphere and right occipital lobe presumed to be septic emboli. Depression, mild delirium, followed by psych.  -Monitor neurological status. Notify the MD for any acute changes in exam.  -Dilaudid PRN.  -Mental status improving slowly   - On psych rec started olanzapine. Agitation improving. Serial EKGs to evaluate QT interval. QTc today 479. Psych recommended decreasing zyprexa given increased flat affect and delayed responses, however much more anxious 12/27 so dose kept at 5 mg BID. Doing better today with both anxiety and affect. Reduced to 2.5 mg BID 1/1.   - Neuro recs fluoxetine for post-stroke recovery, Psych[AS1.1] wants to hold off on any changes to psych medications just prior to discharge. They also recommend against fluoxetine while on fluconazole.[AS1.2]      Pulmonary care: extubated 12/20  - On facemask 2 LPM saturation 100%.  - Encourage pulmonary toilet  - Nasotracheal suctioning  - Removed right chest tube 12/26   - Left pleural tube clamping trial okay, removed 12/27  - Vaseline to lips to avoid drying out.       Cardiovascular:    -Hx HTN, HLD, CAD, AS and aortic aneurysm s/p bentall c/b endocarditis and severe AI s/p aortic root/valve homograft 12/19.  -Keep SBP<120  - Cardiology recently started losartan 25 mg po bid. Pressure a little soft and patient reported being a little lightheaded. Gave 100 ml fluid back with improved BP. May need to reduce Losartan dosing if not tolerating but patient appeared to be dry this AM.    - Coreg 6.25 mg bid since 1/1  - ASA 81 mg and atorvastatin.  - Echo shows EF 20-25% baseline 30%   - No bradycardia or pauses, had been in NSR, TPW removed 12/26.        GI care:   - PEG  - famotidine  - senna/colace/miralax, +BM, cdiff negative 12/26       Fluids/ Electrolytes/ Nutrition:   -PRN electrolyte  replacement  -Bolus tube feeds through PEG, dajuan counts       Renal/ Fluid Balance:   - Garza catheter for strict intake and output.  - Monitor BMP, creatinine  - IV lasix transitioned to Bumex 2 mg PO BID on 12/26, evening dose held on 12/29 due to hypotension and lightheadedness. Stable Bumex 1 mg po bid since 12/30.        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 Hx of MSSA septic arthritis and bacteremia. Hx of multifocal acute infarctions involving the left parietotemporal lobes, left cerebral hemisphere and right occipital lobe presumed to be septic emboli. ID following.   -New cultures BAL growing pseudomonas aeruginosa. ID following. Continue nafcillin/cefepime/rifampin per ID. WBC[AS1.1] stable[AS1.2].    - Fluconazole for oral thrush (7 days) 12/30      ID rec's from 12/26  - restart PTA nafcillin 2g IV q4h, previously planned duration until 1/7/18  - continue rifampin 300mg PO BID, previously planned duration until 1/7/18  -continue cefepime for 2 total weeks duration for pseudomonas pneumonia (end date 1/3/18)  -F/u surgical tissue cultures       Heme:   - Acute blood loss anemia  - Hgb stable.        Prophylaxis:    -Mechanical prophylaxis for DVT.   -Heparin TID.       Lines/ tubes/ drains:  -R triple lumen PICC, garza, PIVs       Disposition:  -Floor since 12/23   -Recommending discharge to TCU, however, family would like to take patient home as the facility they would prefer does not have a bed available. Patient will discharge to home tomorrow with home care and home infusion for antibiotics. Patient will need prescriptions for hospital bed and shelia lift due to his previous CVA and right sided paralysis and overall deconditioning. Prescriptions       Discussed with CVTS Fellow   Staff surgeons have been informed of changes through both  verbal and written communication.      Florence Chau PA-C  Cardiothoracic Surgery    Pager [phone]  January 2, 2018[AS1.1]       Revision History        User Key Date/Time User Provider Type Action    > AS1.2 1/2/2018  3:57 PM Florence Juan PA-C Physician Assistant Sign     AS1.1 1/2/2018  3:07 PM Florence Juan PA-C Physician Assistant             Progress Notes by Angelina Calabrese MSW at 1/2/2018  1:39 PM     Author:  Angelina Calabrese MSW Service:  Social Work Author Type:      Filed:  1/2/2018  1:54 PM Date of Service:  1/2/2018  1:39 PM Creation Time:  1/2/2018  1:39 PM    Status:  Addendum :  Angelina Calabrese MSW ()         Social Work Services Progress Note    Hospital Day: 17  Date of Initial Social Work Evaluation:  12/26/17  Collaborated with: Meghna (SO) Eloy (daughter), CVTS team, SNF admissions: [facility]    Data:  Pt is a 62 year old male being followed by KARLENE for placement to rehab.  SW has been getting denials for pt due to pt's cognition, medical needs and rehab potential    Intervention:  KARLENE called pt's daughter Eloy and spoke with VERONICA Coffman in person.  Meghna at this time is not wanting to pursue any other placements other than [facility].  At this time pt is denied to the following facilities:      - [facility]: denied and will not be waitlisted due to being too medically complex for both the TCU and cognitive impairment TCU   - [facility]: does not have a private room for pt   -  [facility]: not a short term rehab candidate    SO is aware that pt is declined and is not wanting any other referrals made this admission.  SO called [facility] and will be pursuing homecare services.  SO will need to work with [facility] in the future if the pt is wanting to go for rehab.  Pt cannot be wait-listed this admission per rehab team.[LH1.1]    CVTS team continues to recommend TCU for deconditioning post surgery.  SO aware.[LH1.2]    Assessment:  Pt sleeping soundly in bed.  SW worked with  SO Meghna in person.    Plan:    Anticipated Disposition:  Home with services - per SO choice    Barriers to d/c plan: Resumption of homecare orders and equipment.    Follow Up: RNCC to follow for discharge planning to home.    [facility], [facility]  [address]   Phone: [phone]  Pager: [phone]  Unit: [phone][LH1.1]             Revision History        User Key Date/Time User Provider Type Action    > [N/A] 1/2/2018  1:54 PM Angelina Calabrese MSW  Addend     LH1.2 1/2/2018  1:51 PM Angelina Calabrese MSW  Sign     LH1.1 1/2/2018  1:39 PM Angelina Calabrese MSW              Progress Notes by Deny Trimble PA at 1/1/2018  8:59 AM     Author:  Deny Trimble PA Service:  Cardiothoracic Surgery Author Type:  Physician Assistant    Filed:  1/1/2018 11:20 AM Date of Service:  1/1/2018  8:59 AM Creation Time:  1/1/2018  8:59 AM    Status:  Signed :  Deny Trimble PA (Physician Assistant)         CVTS Daily Note  1/1/2018  Attending: Pili Bowers,*    S:   No overnight events.[CE1.1] Transient flattened affect and not following commands.[CE1.2]   Pt seen at bedside resting comfortably.[CE1.1]    N[CE1.2]o acute complaints.      Denies F/C/Sweats.  No CP, SOB, or calf pain.    Tolerating diet and tube feeds.[CE1.1]  +[CE1.2] BM.[CE1.1]  +[CE1.2] Flatus.[CE1.1]    R sided hemiparesis, L sided weakness, stable overall.[CE1.2]   Pain controlled well.    O:   Vitals:    12/31/17 2003 01/01/18 0200 01/01/18 0526 01/01/18 0833   BP: (!) 136/94 109/73 131/90 109/87   BP Location: Left arm      Pulse:       Resp: 18 18 18 18   Temp: 97.9  F (36.6  C) 97.9  F (36.6  C) 97.8  F (36.6  C) 98.2  F (36.8  C)   TempSrc: Oral Axillary Axillary    SpO2: 95% 98% 100% 96%   Weight:         Vitals:    12/29/17 0600 12/30/17 0400 12/31/17 0500   Weight: 74.5 kg (164 lb 3.9 oz) 74 kg (163 lb 2.3 oz) 73.9 kg (163 lb)[CE1.1]      - 9[CE1.2] kg since admit      Intake/Output Summary (Last 24 hours) at 01/01/18 0859  Last data filed at 01/01/18 0814   Gross per 24 hour   Intake             2636 ml   Output             1640 ml   Net              996 ml       MAPs:[CE1.1] 81 - 118[CE1.2]  Gen: AAO x 3, pleasant, NAD[CE1.1], L facial droop  Neuro: L facial droop, R sided gross motor movement today, L sided purposeful movements but not following exact commands   CV: RRR[CE1.3], S1S2 normal, no murmurs, rubs, or gallops.   Pulm: CTA, no rhonchi or wheezes  Abd: soft, non-tender, no guarding  Ext:[CE1.1] no[CE1.3] peripheral edema  Incision: clean, dry, intact, no erythema  Chest Tube sites: dressings clean and dry    Labs:  BMP[CE1.1]    Recent Labs  Lab 01/01/18  0819 12/31/17  0705 12/30/17  0703 12/29/17  0732   * 141 142 139   POTASSIUM 4.0 3.5 3.7 3.6   CHLORIDE 110* 107 108 105   IZABELLA 9.4 9.3 9.3 9.4   CO2 25 25 24 26   BUN 27 28 34* 33*   CR 0.84 0.86 0.93 0.83   * 108* 121* 118*[CE1.4]     CBC[CE1.1]    Recent Labs  Lab 01/01/18  0819 12/31/17  0705 12/30/17  0703 12/29/17  0732   WBC 12.7* 11.9* 12.5* 13.6*   RBC 3.01* 2.85* 2.97* 3.08*   HGB 9.4* 8.8* 9.1* 9.4*   HCT 31.0* 29.6* 30.3* 31.3*   * 104* 102* 102*   MCH 31.2 30.9 30.6 30.5   MCHC 30.3* 29.7* 30.0* 30.0*   RDW 19.0* 18.7* 18.6* 18.5*   * 458* 492* 526*[CE1.4]     Hepatic Panel   Lab Results   Component Value Date    AST 32 12/18/2017     Lab Results   Component Value Date    ALT 30 12/18/2017     Lab Results   Component Value Date    ALBUMIN 1.5 12/18/2017     GLUCOSE:     Recent Labs  Lab 12/31/17  0705 12/30/17  0703 12/29/17  2150 12/29/17  1703 12/29/17  1429 12/29/17  0732 12/28/17  2115 12/28/17  1914 12/28/17  1109 12/28/17  0727  12/27/17  0430  12/26/17  0623   * 121*  --   --   --  118*  --   --   --  116*  --  159*  --  128*   BGM  --   --  120* 167* 127*  --  148* 163* 135*  --   < >  --   < >  --    < > = values in this interval  not displayed.    Imaging:[CE1.1]    CXR 12/31-  1. Stable postsurgical changes of cardiac surgery with stable enlargement of the cardiac silhouette.  2. No acute airspace disease.[CE1.2]    ASSESSMENT: Cricket Miguel is a 62 year old male with hx of COPD, HTN, HLD, CAD, heart failure, aortic stenosis and ascending aortic aneurysm s/p aortic valve replacement and aortic root replacement (April 2016) c/b moises-aortic abscess, endocarditis, cerebral septic emboli and severe AI s/p redo-sternotomy aortic root and valve homograft 12/19. Post op EF 20-25%.        PLAN:   Neuro/ pain/ sedation: Hx of multifocal acute infarctions involving the left parietotemporal lobes, left cerebral hemisphere and right occipital lobe presumed to be septic emboli. Depression, mild delirium, followed by psych.  -Monitor neurological status. Notify the MD for any acute changes in exam.  -Dilaudid PRN.  -Mental status improving[CE1.1] slowly[CE1.2]   - On psych rec started olanzapine. Agitation improving. Serial EKGs to evaluate QT interval. QTc today 479. Psych recommended decreasing zyprexa given increased flat affect and delayed responses, however much more anxious 12/27 so dose kept at 5 mg BID. Doing better today with both anxiety and affect.[CE1.1] Reduced to 2.5 mg BID 1/1[CE1.2].[CE1.1]   - Neuro recs fluoxetine for post-stroke recovery,[CE1.2] need to[CE1.3] call Psych on Tues 1/2 to review[CE1.2]     Pulmonary care: extubated 12/20  - On facemask 2 LPM saturation 100%.  - Encourage pulmonary toilet  - Nasotracheal suctioning  - Removed right chest tube 12/26   - Left pleural tube clamping trial okay, removed 12/27[CE1.1]  - Vaseline to lips to avoid drying out.[CE1.3]       Cardiovascular:    -Hx HTN, HLD, CAD, AS and aortic aneurysm s/p bentall c/b endocarditis and severe AI s/p aortic root/valve homograft 12/19.  -Keep SBP<120  - Cardiology recently started losartan 25 mg po bid. Pressure a little soft and patient  reported being a little lightheaded. Gave 100 ml fluid back with improved BP. May need to reduce Losartan dosing if not tolerating but patient appeared to be dry this AM.    - Coreg[CE1.1] 6.25 m[CE1.2]g bid since 1[CE1.1]/1[CE1.2]  - ASA 81 mg and atorvastatin.  - Echo shows EF 20-25% baseline 30%   - No bradycardia or pauses, had been in NSR, TPW removed 12/26.        GI care:   - PEG  - famotidine  - senna/colace/miralax, +BM, cdiff negative 12/26       Fluids/ Electrolytes/ Nutrition:   -PRN electrolyte replacement  -Bolus tube feeds through PEG, dajuan counts[CE1.1]  -HyperNA 1/1, give extra 100 mL via G tube twice today.[CE1.3]      Renal/ Fluid Balance:   - Garza catheter for strict intake and output.  - Monitor BMP, creatinine  - IV lasix transitioned to Bumex 2 mg PO BID on 12/26, evening dose held on 12/29 due to hypotension and lightheadedness. Stable Bumex 1 mg po bid since 12/30.        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 Hx of MSSA septic arthritis and bacteremia. Hx of multifocal acute infarctions involving the left parietotemporal lobes, left cerebral hemisphere and right occipital lobe presumed to be septic emboli. ID following.   -New cultures BAL growing pseudomonas aeruginosa. ID following. Continue nafcillin/cefepime/rifampin per ID. WBC 1[CE1.1]2.7.[CE1.2]    - Fluconazole for oral thrush (7 days) 12/30      ID rec's from 12/26  - restart PTA nafcillin 2g IV q4h, previously planned duration until 1/7/18  - continue rifampin 300mg PO BID, previously planned duration until 1/7/18  -continue cefepime for 2 total weeks duration for pseudomonas pneumonia (end date 1/3/18)  -F/u surgical tissue cultures       Heme:   - Acute blood loss anemia  - Hgb stable.        Prophylaxis:    -Mechanical prophylaxis for DVT.   -Heparin TID.       Lines/ tubes/ drains:  -R triple lumen PICC, garza, PIVs       Disposition:  -Floor since  12/23   -Recommending discharge to TCU when bed available, early this week.       Discussed with CVTS Fellow   Staff surgeons have been informed of changes through both  verbal and written communication.      Deny Trimble PA-C  Cardiothoracic Surgery  Pager [phone]    8:59 AM   January 1, 2018[CE1.1]     Revision History        User Key Date/Time User Provider Type Action    > CE1.3 1/1/2018 11:20 AM Deny Trimble PA Physician Assistant Sign     CE1.4 1/1/2018 10:54 AM Deny Trimble PA Physician Assistant Share     CE1.2 1/1/2018 10:05 AM Deny Trimble PA Physician Assistant      CE1.1 1/1/2018  9:00 AM Deny Trimble PA Physician Assistant Share            Progress Notes by Lizeth Kahn RN at 1/1/2018  7:40 AM     Author:  iLzeth Kahn RN Service:  Cardiology Author Type:  Registered Nurse    Filed:  1/1/2018  7:40 AM Date of Service:  1/1/2018  7:40 AM Creation Time:  1/1/2018  7:40 AM    Status:  Signed :  Lizeth Kahn RN (Registered Nurse)         CHRISTA: Pt with h/y of COPD, HTN,HLD, CAD, HF,aortic stenosis, AA aneurysm,aortic valve replacement,aortic root replacement (april,2016) complicated by moises  aortic abscess, endocarditis, redo sternotomy aortic root and valve homograft 12/19 , CVA per note.   VSS, SR with HR at , BP stable,  no neuro changes. Condom catheter in place with AUO, repositioned e/y 2 hours,  meds through G -tube. Coccyx  red,blanchable,barrier cream applied.  BM X 1 loose.   Plan: Continue to monitor,notify MD as needed. Possible will be dc toTCU this week.[ET1.1]      Revision History        User Key Date/Time User Provider Type Action    > ET1.1 1/1/2018  7:40 AM Lizeth Kahn RN Registered Nurse Sign            Progress Notes by Lizeth Kahn RN at 1/1/2018  6:38 AM     Author:  Lizeth Kahn RN Service:  Cardiology Author Type:  Registered Nurse    Filed:  1/1/2018  6:55 AM Date  of Service:  1/1/2018  6:38 AM Creation Time:  1/1/2018  6:38 AM    Status:  Addendum :  Lizeth Kahn RN (Registered Nurse)         CHRISTA: Pt with h/y of COPD, HTN,HLD, CAD, HF,aortic stenosis, AA aneurysm,aortic valve replacement,aortic root replacement (april,2016) complicated by moises  aortic abscess, endocarditis, redo sternotomy aortic root and valve homograft 12/19 , CVA per note.   VSS, SR with HR at , BP stable,  no neuro changes. Condom catheter in place with AUO, repositioned e/y 2 hours,  meds through G -tube. Coccyx  red,blanchable,barrier cream applied.  BM X 1 loose.   Plan: Continue to monitor,notify MD as needed. Possible will be dc toTCU next week.[ET1.1]      Revision History        User Key Date/Time User Provider Type Action    > [N/A] 1/1/2018  6:55 AM Lizeth Kahn RN Registered Nurse Addend     [N/A] 1/1/2018  6:55 AM Lizeth Kahn RN Registered Nurse Addend     ET1.1 1/1/2018  6:39 AM Lizeth Kahn RN Registered Nurse Sign                     Procedure Notes      Procedures by Rea Boyce MD at 12/20/2017 12:20 PM     Author:  Rea Boyce MD Service:  Cardiac ICU Author Type:  Resident    Filed:  12/20/2017 12:37 PM Date of Service:  12/20/2017 12:20 PM Creation Time:  12/20/2017 12:34 PM    Status:  Attested :  Rea Boyce MD (Resident)    Cosigner:  Tera Alford MD at 12/20/2017 12:38 PM         Pre-procedure Diagnoses:    1. Acute on chronic systolic heart failure (H) [I50.23]           Procedures:    1. BRONCHOSCOPY [EXAMBRONC]          Attestation signed by Tera Alford MD at 12/20/2017 12:38 PM        Attestation:  Physician Attestation   I spent a total of 10 minutes with the patient, personally observing the resident as she performed fiberoptic bronchoscopy. Indication purulent secretions from ETT    Key findings: as detailed in resident note    Tera Alford  Date of Service (when I saw the  patient): 17                               SICU BEDSIDE PROCEDURE NOTE  NAME:  Cricket Miguel   MRN:  [id number]   :  1955     Diagnosis:  Respiratory failure    Procedure: Flexible bronchoscopy     Specimen: BAL sent    Premedication: Pt intubated and sedated prior to procedure  Procedure Meds: 1% topical lidocaine solution at the bart    Procedure: A timeout was called to review the case and patient information. The patient was positioned supine. The bronchoscope was passed into the distal trachea via ET tube without difficulty.  Bilateral tracheobronchial trees were inspected closely to the level of the subsegmental bronchi.  Secretions were found to be yellowish green. Minimal quantity. Not thick. Mostly located in the right lung. These were lavaged and evacuated without difficulty. The samples were sent for BAL. The procedure was completed and the patient tolerated the procedure well and without complications.      Findings: Minimal yellowish/green, thin secretions.    Plan: f/u cultures    Dr. Alford was available for assistance throughout the entire procedure.      Rea Boyce M.D.  CA-2/PGY-3  2017  12:34 PM[LA1.1]                            Revision History        User Key Date/Time User Provider Type Action    > LA1.1 2017 12:37 PM Rea Boyce MD Resident Sign                  Progress Notes - Therapies (Notes from 18 through 18)     No notes of this type exist for this encounter.

## 2017-12-16 NOTE — IP AVS SNAPSHOT
"` `     [facility] [facility]: [phone]                                              INTERAGENCY TRANSFER FORM - NURSING   2017                    Hospital Admission Date: 2017  ASHTYN STROUD   : 1955  Sex: Male        Attending Provider: Pili Bowers MD     Allergies:  Lisinopril    Infection:  None   Service:  CARDIOTHORAC    Ht:  1.727 m (5' 8\")   Wt:  74.9 kg (165 lb 2 oz)   Admission Wt:  83 kg (182 lb 15.7 oz)    BMI:  25.11 kg/m 2   BSA:  1.9 m 2            Patient PCP Information     Provider PCP Type    Dipak Gordillo MD General      Current Code Status     Date Active Code Status Order ID Comments User Context       Prior      Code Status History     Date Active Date Inactive Code Status Order ID Comments User Context    2018 11:59 AM  Full Code [id number]  Florence Juan PA-C Outpatient    1/3/2018  8:42 AM 2018 11:59 AM Full Code [id number]  Benny Unger PA-C Outpatient    2017  5:44 PM 1/3/2018  8:42 AM Full Code [id number]  Onur Chávez MD Inpatient    2017 12:44 AM 2017  5:44 PM Full Code [id number]  Cong Lei MD Inpatient    10/6/2017  6:50 PM 2017  4:30 PM Full Code [id number]  Awais Ma MD Inpatient    2016  2:14 PM 2016  2:45 PM Full Code [id number]  Rosendo Gama MD Inpatient    2016  4:32 PM 2016  2:14 PM Full Code [id number]  Elma Silva MD Outpatient    2016  4:13 PM 2016  4:32 PM Full Code [id number]  Elma Silva MD Inpatient      Advance Directives        Does patient have a scanned Advance Directive/ACP document in EPIC?           Yes        Hospital Problems as of 2018              Priority Class Noted POA    Heart failure (H) Medium  2017 Yes    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 Medium  2017 Yes      Non-Hospital Problems as of 2018              Priority Class Noted    Tobacco use disorder Medium  " 5/25/2006    Hypertension goal BP (blood pressure) < 140/90 High  1/29/2008    Disease of lung Medium  1/29/2008    Major depressive disorder, single episode, moderate (HCC) Medium  Unknown    Hyperlipidemia LDL goal <70 Medium  10/31/2010    Advance Care Planning Low  2/7/2012    Psoriasis Low  Unknown    COPD, mild (H) Medium  Unknown    CHF (congestive heart failure), NYHA class II (H) Medium  Unknown    AR (aortic regurgitation) Medium  Unknown    AI (aortic insufficiency) Medium  Unknown    Aortic root dilatation (H) Medium  Unknown    Health Care Home Medium  5/2/2016    Status post coronary angiogram Medium  5/6/2016    Cardiomyopathy (H) Medium  Unknown    S/P AVR (aortic valve replacement) Medium  5/23/2016    H/O aortic root repair Medium  5/23/2016    Anemia Medium  5/23/2016    Abscess of aortic root Medium  9/1/2017    CVA (cerebral vascular accident) (H) Medium  9/1/2017    Depression Medium  9/1/2017    Endocarditis Medium  10/6/2017    Encephalopathy Medium  10/17/2017    Anxiety Medium  Unknown      Immunizations     Name Date      Influenza Vaccine IM 3yrs+ 4 Valent IIV4 10/28/17     Pneumococcal 23 valent 11/04/17     TD (ADULT, 7+) 04/14/99          END      ASSESSMENT     Discharge Profile Flowsheet     DISCHARGE NEEDS ASSESSMENT     Resources List  LTACH ([place]) 11/03/17 1234    Equipment Currently Used at Home  hospital bed;lift device;wheelchair, manual;commode;other (see comments) (shelia, bedpan) 12/18/17 1522   [facility] [phone] 01/02/18 1756    Transportation Available  family or friend will provide;other (see comments) (medical transportation) 12/18/17 1522   Home Care  [facility] [phone], Fax: [phone] 01/02/18 1753    # of Referrals Placed by CTS  Communication hand-offs to next level of Care Providers 04/28/16 1131   Home Infusion Provider  [facility] [phone], Fax: [phone] 01/02/18 1756    FUNCTIONAL LEVEL CURRENT      SKIN      Ambulation  4 - completely dependent 01/04/18 1203   Inspection of bony prominences  Full 01/04/18 1016    Transferring  4 - completely dependent 01/04/18 1203   Full except areas not inspected   Heel, right;Heel, left;Occiput;Scapula, right;Scapula, left;Elbow, left;Elbow, right;Spine;Hip, left;Hip, right 01/02/18 0111    Toileting  4 - completely dependent 01/04/18 1203   Procedural focused assessment (identify areas inspected)   Cheek, left;Cheek, right;Foot, left;Foot, right;Knee, left;Knee, right 12/19/17 1735    Bathing  4 - completely dependent 01/04/18 1203   Inspection under devices  Full 01/04/18 1016    Dressing  4 - completely dependent 01/04/18 1203   Not Inspected under devices.  -- (Gtube) 01/02/18 0111    Eating  2 - assistive person 01/04/18 1203   Skin WDL  ex 01/04/18 1016    Communication  2 - difficulty understanding and speaking (not related to language barrier) 01/04/18 1203   Skin Color/Characteristics  bruised (ecchymotic) 01/04/18 1016    Swallowing  2 - difficulty swallowing foods 01/04/18 1203   Skin Temperature  warm 01/04/18 1016    Change in Functional Status Since Onset of Current Illness/Injury  yes 12/17/17 0052   Skin Moisture  clammy 01/04/18 1203    GASTROINTESTINAL (ADULT,PEDIATRIC,OB)     Skin Elasticity  quick return to original state 01/04/18 1203    GI WDL  ex 01/04/18 1016   Skin Integrity  bruise(s);drain/device(s);incision(s) 01/04/18 1016    Abdominal Appearance  flat 01/04/18 1203   Focused inspection under devices  Glasses 01/04/18 1203    All Quadrants Bowel Sounds  audible and active in all quadrants 01/04/18 1203   SAFETY      All Quadrants Palpation  soft/nontender 01/02/18 1125   Safety WDL  WDL 01/04/18 1203    Last Bowel Movement  01/04/18 01/04/18 1203   Safety Factors  side rails raised x 3 01/04/18 1203    GI Signs/Symptoms  diarrhea 01/04/18 1203   Safety Equipment  oxygen flowmeter 01/04/18 1203    Passing flatus  yes 01/04/18 1203   All Alarms   "alarm(s) activated and audible 01/04/18 1203    COMMUNICATION ASSESSMENT     Additional Documentation  Aspiration Risk Screen (Row) 01/01/18 0343    Patient's communication style  spoken language (English or Bilingual) 12/17/17 0135   Aspiration Risk Screen  inability to handle secretions 01/04/18 1203    FINAL RESOURCES                        Assessment WDL (Within Defined Limits) Definitions           Safety WDL     Effective: 09/28/15    Row Information: <b>WDL Definition:</b> Bed in low position, wheels locked; call light in reach; upper side rails up x 2; ID band on<br> <font color=\"gray\"><i>Item=AS safety wdl>>List=AS safety wdl>>Version=F14</i></font>      Skin WDL     Effective: 09/28/15    Row Information: <b>WDL Definition:</b> Warm; dry; intact; elastic; without discoloration; pressure points without redness<br> <font color=\"gray\"><i>Item=AS skin wdl>>List=AS skin wdl>>Version=F14</i></font>      Vitals     Vital Signs Flowsheet     VITAL SIGNS     Total  0 12/22/17 1538    Temp  98  F (36.7  C) 01/04/18 1110   ANALGESIA SIDE EFFECTS MONITORING      Temp src  Oral 01/04/18 1110   Side Effects Monitoring: Respiratory Quality  R 01/04/18 1015    Resp  18 01/04/18 1110   Side Effects Monitoring: Respiratory Depth  S 01/04/18 1015    Pulse  68 01/04/18 1015   Side Effects Monitoring: Sedation Level  S 01/04/18 1015    Heart Rate  78 01/04/18 1110   HEIGHT AND WEIGHT      Pulse/Heart Rate Source  Monitor 01/01/18 2245   Weight  74.9 kg (165 lb 2 oz) 01/02/18 0230    BP  111/71 01/04/18 1110   Weight Method  Bed scale 12/30/17 0442    BP Location  Left arm 01/04/18 1110   POSITIONING      OXYGEN THERAPY     Body Position  turned 01/04/18 1203    SpO2  97 % 01/04/18 1110   Head of Bed (HOB)  HOB at 45 degrees 01/04/18 1203    O2 Device  None (Room air) 01/04/18 1110   Positioning/Transfer Devices  pillows 01/04/18 1203    FiO2 (%)  50 % 12/20/17 1512   Chair  Recline and up in chair 01/04/18 1203    Oxygen " Delivery  2 LPM 12/26/17 1144   DAILY CARE      Suction Occurrance  1 12/22/17 0502   Activity Management  up in chair 01/04/18 1203    PAIN/COMFORT     Activity Assistance Provided  assistance, 2 people 01/04/18 1203    Patient Currently in Pain  denies 01/04/18 1015   Assistive Device Utilized  mechanical lift 01/04/18 1203    Preferred Pain Scale  CAPA (Clinically Aligned Pain Assessment) ([facility] Adults Only) 01/04/18 1015   ECG      Patient's Stated Pain Goal  No pain 01/04/18 1015   ECG Rhythm  Sinus rhythm 01/04/18 1015    Pain Location  Generalized 12/24/17 0807   Ectopy  PVC 01/04/18 1015    Pain Descriptors  Sore 12/23/17 1327   Lead Monitored  Lead II 01/04/18 1015    Pain Intervention(s)  Medication (See eMAR) 12/24/17 0807   Ectopy Frequency  Rare 01/04/18 1015    Response to Interventions  Absence of nonverbal indicators of pain 01/03/18 2057   Equipment  electrodes changed 01/04/18 1015    CLINICALLY ALIGNED PAIN ASSESSMENT (CAPA) ([facility] ADULTS ONLY)     Rhythm Comment  ST Segment Normal 12/29/17 1059    Comfort  negligible pain 01/04/18 1015   PACEMAKER      Change in Pain  about the same 01/02/18 1821   Pacemaker  Temporary 12/26/17 1850    Pain Control  fully effective 01/02/18 1821   Function  -- 12/24/17 0920    Functioning  can do most things, but pain gets in the way of some 01/02/18 1821   Paced Amount  On standby only 12/23/17 2115    Sleep  normal sleep 01/02/18 1821   Standby Status  Capped 12/26/17 1422    CRITICAL-CARE PAIN OBSERVATION TOOL (CPOT)     Site Assessment  WDL 12/26/17 1422    Facial Expression  0 12/22/17 1538   Dressing Status  Normal: Clean, Dry & Intact 12/26/17 1422    Body Movements  0 12/22/17 1538   Dressing Change Due  12/24/17 12/23/17 0701    Compliance w/ventilator (intubated patients)  Extubated 12/22/17 1538   POINT OF CARE TESTING      Vocalization (extubated patients)  0 12/22/17 1538   Puncture Site  fingertip 12/28/17  1916    Muscle Tension  0 12/22/17 1538   Bedside Glucose (mg/dl )   163 mg/dl 12/28/17 1916            Patient Lines/Drains/Airways Status    Active LINES/DRAINS/AIRWAYS     Name: Placement date: Placement time: Site: Days: Last dressing change:    Airway - Adult/Peds 6 Shiley 10/27/17   1430   6   69     Gastrostomy/Enterostomy Gastrostomy LLQ 1 20 fr 10/30/17   1517   LLQ   66     Wound 05/20/16 Epigastrum Surgical 2 old  chest tube sites 05/20/16   0945   Epigastrum   594     Wound 12/19/17 Medial Chest Surgical 12/19/17   1659   Chest   15     Wound 01/03/18 Posterior Gluteal Abscess infected hair follicle 01/03/18   1100   Gluteal   1     Incision/Surgical Site 12/21/12 Right Groin 12/21/12   1535    1839     Incision/Surgical Site 05/17/16 Left Leg 05/17/16   1137    597     Incision/Surgical Site 05/17/16 Chest 05/17/16   1137    597     Incision/Surgical Site 10/08/17 Left Knee 10/08/17   0902    88     Incision/Surgical Site 12/24/17 Medial Chest 12/24/17   0100    11             Patient Lines/Drains/Airways Status    Active PICC/CVC     Name: Placement date: Placement time: Site: Days: Additional Info Last dressing change:    PICC Triple Lumen 10/21/17 Right Basilic 10/21/17   1318   Basilic   75 External Cath Length (cm): 0 cm   01/03/18 1200 (26.62 hrs)         Size (Fr): 5 Fr            Orientation: Right            Description: Non - valved (open ended)            Total Catheter Length (cm) Trimmed: 47 cm            Site Prep: Chlorhexidine            Local Anesthetic: Injectable            Insertion attempts with ultrasound: 1            Full barrier precautions done: Yes            Consent Signed: Yes            Time Out performed: Yes            Lot #: [id number]               Intake/Output Detail Report     Date Intake                       Output         Net    Shift P.O. I.V. Other NG/GT IV Piggyback Colloid Enteral Platelets Red Blood Cells Plasma Cryoprecipitate Blood Components Total  Urine Emesis/NG output Stool Blood Chest Tube Total       Day 01/03/18 0000 - 01/03/18 0659 -- 300 -- -- -- -- -- -- -- -- -- -- 300 -- -- -- -- -- -- 300    Keisha 01/03/18 0700 - 01/03/18 1459 480 100 -- -- -- -- -- -- -- -- -- -- 580 -- -- -- -- -- -- 580    Noc 01/03/18 1500 - 01/03/18 2359 -- 100 -- 180 -- -- -- -- -- -- -- -- 280 100 -- -- -- -- 100 180    Day 01/04/18 0000 - 01/04/18 0659 -- 200 -- -- -- -- -- -- -- -- -- -- 200 -- -- -- -- -- -- 200    Keisha 01/04/18 0700 - 01/04/18 1459 -- -- -- -- -- -- -- -- -- -- -- -- -- -- -- -- -- -- -- 0      Last Void/BM       Most Recent Value    Urine Occurrence 1 [large] at 01/04/2018 0600    Stool Occurrence 1 at 01/03/2018 1109      Case Management/Discharge Planning     Case Management/Discharge Planning Flowsheet     REFERRAL INFORMATION     [facility] [phone] 01/02/18 1756    # of Referrals Placed by CTS  Communication hand-offs to next level of Care Providers 04/28/16 1131   Home Care  [facility] [phone], Fax: [phone] 01/02/18 1756    LIVING ENVIRONMENT     Home Infusion Provider  [facility] [phone], Fax: [phone] 01/02/18 1756    Lives With  significant other 12/18/17 1522   /BH CAREGIVER      Living Arrangements  house (all needs met on main floor) 12/18/17 1522   Filed Complexity Screen Score  13 12/20/17 1115    COPING/STRESS     ABUSE RISK SCREEN      Major Change/Loss/Stressor  hospitalization 12/17/17 0047   QUESTION TO PATIENT:  Has a member of your family or a partner(now or in the past) intimidated, hurt, manipulated, or controlled you in any way?  no 12/17/17 0044    DISCHARGE PLANNING     QUESTION TO PATIENT: Do you feel safe going back to the place where you are living?  yes 12/17/17 0044    Transportation Available  family or friend will provide;other (see comments) (medical transportation) 12/18/17 1522   OBSERVATION: Is there reason to believe there has been maltreatment of a  vulnerable adult (ie. Physical/Sexual/Emotional abuse, self neglect, lack of adequate food, shelter, medical care, or financial exploitation)?  no 12/17/17 0044    FINAL RESOURCES     (R) MENTAL HEALTH SUICIDE RISK      Equipment Currently Used at Home  hospital bed;lift device;wheelchair, manual;commode;other (see comments) (kt bass) 12/18/17 1522   Are you depressed or being treated for depression?  No 12/17/17 0050    Resources List  LTACH ([place]) 11/03/17 1234

## 2017-12-17 ENCOUNTER — APPOINTMENT (OUTPATIENT)
Dept: CT IMAGING | Facility: CLINIC | Age: 62
DRG: 219 | End: 2017-12-17
Attending: THORACIC SURGERY (CARDIOTHORACIC VASCULAR SURGERY)
Payer: COMMERCIAL

## 2017-12-17 ENCOUNTER — APPOINTMENT (OUTPATIENT)
Dept: CARDIOLOGY | Facility: CLINIC | Age: 62
DRG: 219 | End: 2017-12-17
Payer: COMMERCIAL

## 2017-12-17 LAB
ALBUMIN SERPL-MCNC: 1.6 G/DL (ref 3.4–5)
ALBUMIN SERPL-MCNC: 1.6 G/DL (ref 3.4–5)
ALP SERPL-CCNC: 102 U/L (ref 40–150)
ALP SERPL-CCNC: 94 U/L (ref 40–150)
ALT SERPL W P-5'-P-CCNC: 32 U/L (ref 0–70)
ALT SERPL W P-5'-P-CCNC: 34 U/L (ref 0–70)
ANION GAP SERPL CALCULATED.3IONS-SCNC: 8 MMOL/L (ref 3–14)
ANION GAP SERPL CALCULATED.3IONS-SCNC: 8 MMOL/L (ref 3–14)
ANION GAP SERPL CALCULATED.3IONS-SCNC: 9 MMOL/L (ref 3–14)
APTT PPP: 34 SEC (ref 22–37)
AST SERPL W P-5'-P-CCNC: 29 U/L (ref 0–45)
AST SERPL W P-5'-P-CCNC: 33 U/L (ref 0–45)
BASE EXCESS BLDV CALC-SCNC: 6.1 MMOL/L
BASOPHILS # BLD AUTO: 0.1 10E9/L (ref 0–0.2)
BASOPHILS NFR BLD AUTO: 0.6 %
BILIRUB DIRECT SERPL-MCNC: 0.1 MG/DL (ref 0–0.2)
BILIRUB SERPL-MCNC: 1 MG/DL (ref 0.2–1.3)
BILIRUB SERPL-MCNC: 1.3 MG/DL (ref 0.2–1.3)
BUN SERPL-MCNC: 10 MG/DL (ref 7–30)
CALCIUM SERPL-MCNC: 8.7 MG/DL (ref 8.5–10.1)
CALCIUM SERPL-MCNC: 8.9 MG/DL (ref 8.5–10.1)
CALCIUM SERPL-MCNC: 8.9 MG/DL (ref 8.5–10.1)
CHLORIDE SERPL-SCNC: 101 MMOL/L (ref 94–109)
CHLORIDE SERPL-SCNC: 102 MMOL/L (ref 94–109)
CHLORIDE SERPL-SCNC: 102 MMOL/L (ref 94–109)
CO2 SERPL-SCNC: 27 MMOL/L (ref 20–32)
CO2 SERPL-SCNC: 27 MMOL/L (ref 20–32)
CO2 SERPL-SCNC: 30 MMOL/L (ref 20–32)
CREAT SERPL-MCNC: 1.13 MG/DL (ref 0.66–1.25)
CREAT SERPL-MCNC: 1.14 MG/DL (ref 0.66–1.25)
CREAT SERPL-MCNC: 1.22 MG/DL (ref 0.66–1.25)
CRP SERPL-MCNC: 65 MG/L (ref 0–8)
DIFFERENTIAL METHOD BLD: ABNORMAL
EOSINOPHIL # BLD AUTO: 1.1 10E9/L (ref 0–0.7)
EOSINOPHIL NFR BLD AUTO: 9.5 %
ERYTHROCYTE [DISTWIDTH] IN BLOOD BY AUTOMATED COUNT: 16.7 % (ref 10–15)
ERYTHROCYTE [DISTWIDTH] IN BLOOD BY AUTOMATED COUNT: 16.7 % (ref 10–15)
GFR SERPL CREATININE-BSD FRML MDRD: 60 ML/MIN/1.7M2
GFR SERPL CREATININE-BSD FRML MDRD: 65 ML/MIN/1.7M2
GFR SERPL CREATININE-BSD FRML MDRD: 65 ML/MIN/1.7M2
GLUCOSE BLDC GLUCOMTR-MCNC: 101 MG/DL (ref 70–99)
GLUCOSE SERPL-MCNC: 105 MG/DL (ref 70–99)
GLUCOSE SERPL-MCNC: 109 MG/DL (ref 70–99)
GLUCOSE SERPL-MCNC: 111 MG/DL (ref 70–99)
HCO3 BLDV-SCNC: 32 MMOL/L (ref 21–28)
HCT VFR BLD AUTO: 32.7 % (ref 40–53)
HCT VFR BLD AUTO: 34 % (ref 40–53)
HGB BLD-MCNC: 10.5 G/DL (ref 13.3–17.7)
HGB BLD-MCNC: 10.6 G/DL (ref 13.3–17.7)
IMM GRANULOCYTES # BLD: 0 10E9/L (ref 0–0.4)
IMM GRANULOCYTES NFR BLD: 0.2 %
INR PPP: 1.06 (ref 0.86–1.14)
INR PPP: 1.1 (ref 0.86–1.14)
LACTATE BLD-SCNC: 0.7 MMOL/L (ref 0.7–2)
LYMPHOCYTES # BLD AUTO: 1.5 10E9/L (ref 0.8–5.3)
LYMPHOCYTES NFR BLD AUTO: 13.5 %
MAGNESIUM SERPL-MCNC: 1.9 MG/DL (ref 1.6–2.3)
MAGNESIUM SERPL-MCNC: 1.9 MG/DL (ref 1.6–2.3)
MAGNESIUM SERPL-MCNC: 2 MG/DL (ref 1.6–2.3)
MCH RBC QN AUTO: 31.2 PG (ref 26.5–33)
MCH RBC QN AUTO: 31.8 PG (ref 26.5–33)
MCHC RBC AUTO-ENTMCNC: 31.2 G/DL (ref 31.5–36.5)
MCHC RBC AUTO-ENTMCNC: 32.1 G/DL (ref 31.5–36.5)
MCV RBC AUTO: 100 FL (ref 78–100)
MCV RBC AUTO: 99 FL (ref 78–100)
MONOCYTES # BLD AUTO: 0.7 10E9/L (ref 0–1.3)
MONOCYTES NFR BLD AUTO: 5.8 %
NEUTROPHILS # BLD AUTO: 7.8 10E9/L (ref 1.6–8.3)
NEUTROPHILS NFR BLD AUTO: 70.4 %
NRBC # BLD AUTO: 0 10*3/UL
NRBC BLD AUTO-RTO: 0 /100
O2/TOTAL GAS SETTING VFR VENT: ABNORMAL %
OXYHGB MFR BLDV: 60 %
PCO2 BLDV: 51 MM HG (ref 40–50)
PH BLDV: 7.41 PH (ref 7.32–7.43)
PHOSPHATE SERPL-MCNC: 4.2 MG/DL (ref 2.5–4.5)
PLATELET # BLD AUTO: 420 10E9/L (ref 150–450)
PLATELET # BLD AUTO: 428 10E9/L (ref 150–450)
PO2 BLDV: 34 MM HG (ref 25–47)
POTASSIUM SERPL-SCNC: 3.4 MMOL/L (ref 3.4–5.3)
POTASSIUM SERPL-SCNC: 3.6 MMOL/L (ref 3.4–5.3)
POTASSIUM SERPL-SCNC: 3.7 MMOL/L (ref 3.4–5.3)
PROT SERPL-MCNC: 7 G/DL (ref 6.8–8.8)
PROT SERPL-MCNC: 7.4 G/DL (ref 6.8–8.8)
RADIOLOGIST FLAGS: ABNORMAL
RBC # BLD AUTO: 3.3 10E12/L (ref 4.4–5.9)
RBC # BLD AUTO: 3.4 10E12/L (ref 4.4–5.9)
SODIUM SERPL-SCNC: 137 MMOL/L (ref 133–144)
SODIUM SERPL-SCNC: 137 MMOL/L (ref 133–144)
SODIUM SERPL-SCNC: 140 MMOL/L (ref 133–144)
TROPONIN I SERPL-MCNC: 0.55 UG/L (ref 0–0.04)
WBC # BLD AUTO: 10.3 10E9/L (ref 4–11)
WBC # BLD AUTO: 11.2 10E9/L (ref 4–11)

## 2017-12-17 PROCEDURE — 25000125 ZZHC RX 250: Performed by: INTERNAL MEDICINE

## 2017-12-17 PROCEDURE — 93010 ELECTROCARDIOGRAM REPORT: CPT | Mod: 76 | Performed by: INTERNAL MEDICINE

## 2017-12-17 PROCEDURE — 85025 COMPLETE CBC W/AUTO DIFF WBC: CPT | Performed by: INTERNAL MEDICINE

## 2017-12-17 PROCEDURE — 40000275 ZZH STATISTIC RCP TIME EA 10 MIN

## 2017-12-17 PROCEDURE — 36415 COLL VENOUS BLD VENIPUNCTURE: CPT

## 2017-12-17 PROCEDURE — 36592 COLLECT BLOOD FROM PICC: CPT | Performed by: INTERNAL MEDICINE

## 2017-12-17 PROCEDURE — 85610 PROTHROMBIN TIME: CPT | Performed by: INTERNAL MEDICINE

## 2017-12-17 PROCEDURE — 99223 1ST HOSP IP/OBS HIGH 75: CPT | Mod: 25 | Performed by: INTERNAL MEDICINE

## 2017-12-17 PROCEDURE — 25000132 ZZH RX MED GY IP 250 OP 250 PS 637: Performed by: STUDENT IN AN ORGANIZED HEALTH CARE EDUCATION/TRAINING PROGRAM

## 2017-12-17 PROCEDURE — 84484 ASSAY OF TROPONIN QUANT: CPT | Performed by: INTERNAL MEDICINE

## 2017-12-17 PROCEDURE — 40000556 ZZH STATISTIC PERIPHERAL IV START W US GUIDANCE

## 2017-12-17 PROCEDURE — 87040 BLOOD CULTURE FOR BACTERIA: CPT

## 2017-12-17 PROCEDURE — 25000128 H RX IP 250 OP 636: Performed by: STUDENT IN AN ORGANIZED HEALTH CARE EDUCATION/TRAINING PROGRAM

## 2017-12-17 PROCEDURE — 93308 TTE F-UP OR LMTD: CPT | Mod: 26 | Performed by: INTERNAL MEDICINE

## 2017-12-17 PROCEDURE — 25000128 H RX IP 250 OP 636: Performed by: RADIOLOGY

## 2017-12-17 PROCEDURE — 84100 ASSAY OF PHOSPHORUS: CPT | Performed by: INTERNAL MEDICINE

## 2017-12-17 PROCEDURE — 25000128 H RX IP 250 OP 636: Performed by: INTERNAL MEDICINE

## 2017-12-17 PROCEDURE — 85730 THROMBOPLASTIN TIME PARTIAL: CPT | Performed by: INTERNAL MEDICINE

## 2017-12-17 PROCEDURE — 93005 ELECTROCARDIOGRAM TRACING: CPT

## 2017-12-17 PROCEDURE — 93321 DOPPLER ECHO F-UP/LMTD STD: CPT | Mod: 26 | Performed by: INTERNAL MEDICINE

## 2017-12-17 PROCEDURE — 93321 DOPPLER ECHO F-UP/LMTD STD: CPT

## 2017-12-17 PROCEDURE — 74174 CTA ABD&PLVS W/CONTRAST: CPT

## 2017-12-17 PROCEDURE — 80048 BASIC METABOLIC PNL TOTAL CA: CPT | Performed by: INTERNAL MEDICINE

## 2017-12-17 PROCEDURE — 80053 COMPREHEN METABOLIC PANEL: CPT | Performed by: INTERNAL MEDICINE

## 2017-12-17 PROCEDURE — 86140 C-REACTIVE PROTEIN: CPT | Performed by: INTERNAL MEDICINE

## 2017-12-17 PROCEDURE — 93325 DOPPLER ECHO COLOR FLOW MAPG: CPT | Mod: 26 | Performed by: INTERNAL MEDICINE

## 2017-12-17 PROCEDURE — 25500064 ZZH RX 255 OP 636: Performed by: INTERNAL MEDICINE

## 2017-12-17 PROCEDURE — 82805 BLOOD GASES W/O2 SATURATION: CPT | Performed by: INTERNAL MEDICINE

## 2017-12-17 PROCEDURE — 25000132 ZZH RX MED GY IP 250 OP 250 PS 637: Performed by: INTERNAL MEDICINE

## 2017-12-17 PROCEDURE — 00000146 ZZHCL STATISTIC GLUCOSE BY METER IP

## 2017-12-17 PROCEDURE — 25000125 ZZHC RX 250: Performed by: RADIOLOGY

## 2017-12-17 PROCEDURE — 20000004 ZZH R&B ICU UMMC

## 2017-12-17 PROCEDURE — 80076 HEPATIC FUNCTION PANEL: CPT | Performed by: INTERNAL MEDICINE

## 2017-12-17 PROCEDURE — 85027 COMPLETE CBC AUTOMATED: CPT | Performed by: INTERNAL MEDICINE

## 2017-12-17 PROCEDURE — 83735 ASSAY OF MAGNESIUM: CPT | Performed by: INTERNAL MEDICINE

## 2017-12-17 PROCEDURE — 83605 ASSAY OF LACTIC ACID: CPT | Performed by: INTERNAL MEDICINE

## 2017-12-17 RX ORDER — POTASSIUM CHLORIDE 750 MG/1
40 TABLET, EXTENDED RELEASE ORAL ONCE
Status: DISCONTINUED | OUTPATIENT
Start: 2017-12-17 | End: 2017-12-22

## 2017-12-17 RX ORDER — OLANZAPINE 5 MG/1
5 TABLET, ORALLY DISINTEGRATING ORAL ONCE
Status: DISCONTINUED | OUTPATIENT
Start: 2017-12-17 | End: 2017-12-17

## 2017-12-17 RX ORDER — POTASSIUM CHLORIDE 20MEQ/15ML
40 LIQUID (ML) ORAL ONCE
Status: DISCONTINUED | OUTPATIENT
Start: 2017-12-17 | End: 2017-12-22

## 2017-12-17 RX ORDER — POTASSIUM CHLORIDE 1.5 G/1.58G
20-40 POWDER, FOR SOLUTION ORAL
Status: DISCONTINUED | OUTPATIENT
Start: 2017-12-17 | End: 2017-12-19

## 2017-12-17 RX ORDER — OLANZAPINE 5 MG/1
5 TABLET ORAL ONCE
Status: COMPLETED | OUTPATIENT
Start: 2017-12-17 | End: 2017-12-17

## 2017-12-17 RX ORDER — LABETALOL 100 MG/1
100 TABLET, FILM COATED ORAL EVERY 8 HOURS SCHEDULED
Status: DISCONTINUED | OUTPATIENT
Start: 2017-12-17 | End: 2017-12-17

## 2017-12-17 RX ORDER — MIRTAZAPINE 45 MG/1
22.5 TABLET, FILM COATED ORAL AT BEDTIME
Status: DISCONTINUED | OUTPATIENT
Start: 2017-12-17 | End: 2017-12-18

## 2017-12-17 RX ORDER — ARIPIPRAZOLE ORAL 1 MG/ML
2 SOLUTION ORAL 3 TIMES DAILY
Status: DISCONTINUED | OUTPATIENT
Start: 2017-12-17 | End: 2017-12-18

## 2017-12-17 RX ORDER — POTASSIUM CL/LIDO/0.9 % NACL 10MEQ/0.1L
10 INTRAVENOUS SOLUTION, PIGGYBACK (ML) INTRAVENOUS
Status: DISCONTINUED | OUTPATIENT
Start: 2017-12-17 | End: 2017-12-19

## 2017-12-17 RX ORDER — LORAZEPAM 2 MG/ML
0.5 INJECTION INTRAMUSCULAR
Status: COMPLETED | OUTPATIENT
Start: 2017-12-17 | End: 2017-12-17

## 2017-12-17 RX ORDER — ONDANSETRON 2 MG/ML
4 INJECTION INTRAMUSCULAR; INTRAVENOUS EVERY 6 HOURS PRN
Status: DISCONTINUED | OUTPATIENT
Start: 2017-12-17 | End: 2017-12-19

## 2017-12-17 RX ORDER — FUROSEMIDE 10 MG/ML
20 INJECTION INTRAMUSCULAR; INTRAVENOUS
Status: DISCONTINUED | OUTPATIENT
Start: 2017-12-17 | End: 2017-12-20

## 2017-12-17 RX ORDER — FUROSEMIDE 10 MG/ML
20 INJECTION INTRAMUSCULAR; INTRAVENOUS
Status: DISCONTINUED | OUTPATIENT
Start: 2017-12-17 | End: 2017-12-17

## 2017-12-17 RX ORDER — MAGNESIUM SULFATE HEPTAHYDRATE 40 MG/ML
4 INJECTION, SOLUTION INTRAVENOUS EVERY 4 HOURS PRN
Status: DISCONTINUED | OUTPATIENT
Start: 2017-12-17 | End: 2017-12-21

## 2017-12-17 RX ORDER — POTASSIUM CHLORIDE 7.45 MG/ML
10 INJECTION INTRAVENOUS
Status: DISCONTINUED | OUTPATIENT
Start: 2017-12-17 | End: 2017-12-17

## 2017-12-17 RX ORDER — POTASSIUM CHLORIDE 29.8 MG/ML
20 INJECTION INTRAVENOUS
Status: DISCONTINUED | OUTPATIENT
Start: 2017-12-17 | End: 2017-12-17

## 2017-12-17 RX ORDER — QUETIAPINE FUMARATE 25 MG/1
25 TABLET, FILM COATED ORAL ONCE
Status: DISCONTINUED | OUTPATIENT
Start: 2017-12-17 | End: 2017-12-17

## 2017-12-17 RX ORDER — IOPAMIDOL 755 MG/ML
100 INJECTION, SOLUTION INTRAVASCULAR ONCE
Status: COMPLETED | OUTPATIENT
Start: 2017-12-17 | End: 2017-12-17

## 2017-12-17 RX ORDER — POTASSIUM CHLORIDE 750 MG/1
20-40 TABLET, EXTENDED RELEASE ORAL
Status: DISCONTINUED | OUTPATIENT
Start: 2017-12-17 | End: 2017-12-19

## 2017-12-17 RX ADMIN — SODIUM CHLORIDE, PRESERVATIVE FREE 100 ML: 5 INJECTION INTRAVENOUS at 10:58

## 2017-12-17 RX ADMIN — ATORVASTATIN CALCIUM 40 MG: 40 TABLET, FILM COATED ORAL at 08:52

## 2017-12-17 RX ADMIN — HUMAN ALBUMIN MICROSPHERES AND PERFLUTREN 6 ML: 10; .22 INJECTION, SOLUTION INTRAVENOUS at 09:30

## 2017-12-17 RX ADMIN — Medication 22.5 MG: at 21:53

## 2017-12-17 RX ADMIN — FAMOTIDINE 20 MG: 40 POWDER, FOR SUSPENSION ORAL at 08:52

## 2017-12-17 RX ADMIN — Medication 300 MG: at 20:41

## 2017-12-17 RX ADMIN — NAFCILLIN 12 G: 10 INJECTION, POWDER, FOR SOLUTION INTRAVENOUS at 01:39

## 2017-12-17 RX ADMIN — FUROSEMIDE 20 MG: 10 INJECTION, SOLUTION INTRAMUSCULAR; INTRAVENOUS at 17:01

## 2017-12-17 RX ADMIN — ONDANSETRON 4 MG: 2 INJECTION INTRAMUSCULAR; INTRAVENOUS at 10:36

## 2017-12-17 RX ADMIN — Medication 300 MG: at 08:52

## 2017-12-17 RX ADMIN — NAFCILLIN: 10 INJECTION, POWDER, FOR SOLUTION INTRAVENOUS at 18:00

## 2017-12-17 RX ADMIN — SODIUM NITROPRUSSIDE 0.25 MCG/KG/MIN: 25 INJECTION INTRAVENOUS at 13:13

## 2017-12-17 RX ADMIN — ARIPIPRAZOLE 2 MG: 1 SOLUTION ORAL at 14:23

## 2017-12-17 RX ADMIN — ASPIRIN 81 MG CHEWABLE TABLET 81 MG: 81 TABLET CHEWABLE at 08:52

## 2017-12-17 RX ADMIN — MINERAL OIL AND WHITE PETROLATUM: 150; 830 OINTMENT OPHTHALMIC at 21:53

## 2017-12-17 RX ADMIN — OLANZAPINE 5 MG: 5 TABLET, FILM COATED ORAL at 14:23

## 2017-12-17 RX ADMIN — ARIPIPRAZOLE 2 MG: 1 SOLUTION ORAL at 20:41

## 2017-12-17 RX ADMIN — LORAZEPAM 0.5 MG: 2 INJECTION INTRAMUSCULAR; INTRAVENOUS at 23:08

## 2017-12-17 RX ADMIN — IOPAMIDOL 100 ML: 755 INJECTION, SOLUTION INTRAVENOUS at 10:58

## 2017-12-17 RX ADMIN — POTASSIUM CHLORIDE 40 MEQ: 1.5 POWDER, FOR SOLUTION ORAL at 12:47

## 2017-12-17 RX ADMIN — VENLAFAXINE 100 MG: 100 TABLET ORAL at 08:52

## 2017-12-17 RX ADMIN — ARIPIPRAZOLE 2 MG: 1 SOLUTION ORAL at 08:52

## 2017-12-17 RX ADMIN — OLANZAPINE 5 MG: 5 TABLET, ORALLY DISINTEGRATING ORAL at 23:31

## 2017-12-17 RX ADMIN — FAMOTIDINE 20 MG: 40 POWDER, FOR SUSPENSION ORAL at 20:41

## 2017-12-17 RX ADMIN — FUROSEMIDE 20 MG: 10 INJECTION, SOLUTION INTRAMUSCULAR; INTRAVENOUS at 12:47

## 2017-12-17 ASSESSMENT — ACTIVITIES OF DAILY LIVING (ADL)
COGNITION: 0 - NO COGNITION ISSUES REPORTED
WHICH_OF_THE_ABOVE_FUNCTIONAL_RISKS_HAD_A_RECENT_ONSET_OR_CHANGE?: AMBULATION;TRANSFERRING;TOILETING;BATHING;DRESSING;EATING;SWALLOWING
CHANGE_IN_FUNCTIONAL_STATUS_SINCE_ONSET_OF_CURRENT_ILLNESS/INJURY: YES
AMBULATION: 4-->COMPLETELY DEPENDENT
FALL_HISTORY_WITHIN_LAST_SIX_MONTHS: NO
SWALLOWING: 2-->DIFFICULTY SWALLOWING LIQUIDS
RETIRED_COMMUNICATION: 2-->DIFFICULTY UNDERSTANDING (NOT RELATED TO LANGUAGE BARRIER)
DRESS: 4-->COMPLETELY DEPENDENT
SWALLOWING: 2 - DIFFICULTY SWALLOWING LIQUIDS
TOILETING: 2 - ASSISTIVE PERSON
AMBULATION: 4 - COMPLETELY DEPENDENT
EATING: 2 - ASSISTIVE PERSON
TRANSFERRING: 3 - ASSISTIVE EQUIPMENT AND PERSON
TOILETING: 2-->ASSISTIVE PERSON
TRANSFERRING: 3-->ASSISTIVE EQUIPMENT AND PERSON
RETIRED_EATING: 2-->ASSISTIVE PERSON

## 2017-12-17 NOTE — ED PROVIDER NOTES
History     Chief Complaint:  Shortness of Breath    History limited secondary to patient's aphasia.    HPI   Cricket Miguel is a 62 year old male who presents to the emergency department today via EMS accompanied by his girlfriend and son for evaluation of shortness of breath. The patient's girlfriend reports that the patient has felt short of breath for the past few days. She states that initially he was only experiencing shortness of breath when he was lying down to go to bed at night, but states that this morning he developed shortness of breath which has persisted throughout the day today. Of note, the patient's girlfriend reports that this fall the patient was admitted for one month at the [facility] and for another month at [place] before he was discharged home two weeks ago. Please see the brief medical history below for more information. She states that since the patient's discharge, he has been continuing with rifampin and nafcillin. She denies recent medication changes since the patient's discharge. She reports that she does the patient's daily infusions and that nurses, occupational therapists, and physical therapists come to the patient's home. The patient denies nausea, chest pain, back pain, leg swelling, or changes in urination. He reports that he quit smoking two years ago but denies a history of COPD, emphysema, breathing treatments or inhaler use. The girlfriend denies fevers or water pill use. She note that the patient had one episode of emesis a few night ago and has had diarrhea since his discharge. The girlfriend reports that the patient has had right-sided weakness since his stroke but has been able to sit up in a chair daily, although he still requires assistance with transfers and is unable to walk. She notes that the patient's hemoglobin was 10 when it was checked at home last week. Of note, the patient is scheduled to have a valve replacement in January.    Discharge  Summary 11/01/2017 - Brief Medical History  Mr Miguel is a 62 year old male with a PMH of aortic stenosis with ascending aortic aneurysm/CAD s/p aortic valve replacement and aortic root replacement with composite graft (April 2016), HFpEF, COPD, HTN, HLD who presented to the [facility] on 10/06/17 with altered mental status.  He was found to have a large moises-aortic abscess around his replaced root & valve, and multiple strokes from septic emboli from the aorta.       Allergies:  Lisinopril     Medications:    Remeron  Abilify  Imodium  Klor-Con  Seroquel  Venlafaxine  Zofran  Norvasc  Hydrodiuril  Labetalol  Lactobacillus  Menthol-Zinc Oxide  Miconazole Nitrate powder  Vigamox ophthalmic solution  Ketotifen  Systane ointment  Lipitor  Pepcid  Tylenol  Aspirin  Reifaden  Miralax    Past Medical History:    Abscess of aortic root  Aortic insufficiency  Anxiety  Aortic root dilation  Aortic regurgitation  Cardiomyopathy  CHF  Cerebral vascular accident  Depression  Heart murmur  Hyperlipidemia  Hypertension  Inguinal hernia  Left lung nodule  Major depressive disorder  Psoriasis  Tobacco use disorder    Past Surgical History:    Left knee arthroscopy, incision and drainage (10/08/2017)  Shoulder arthroscopy, left  Shoulder arthroscopy, right  Umbilical hernia repair  Inguinal herniorrhaphy  Aortic valve replacement  Right rotator cuff repair  Aortic root repair  Tracheostomy percutaneous    Family History:    Mother: Genetic disorder (bone platelet overgrowth), brain cancer  Father: Alzheimer disease    Social History:  The patient was accompanied to the ED by his girlfriend and son.  Smoking Status: Former Smoker  Smokeless Tobacco: Never Used  Alcohol Use: Positive  Marital Status:       Review of Systems   Constitutional: Negative for fever.   Respiratory: Positive for shortness of breath.    Cardiovascular: Negative for chest pain and leg swelling.   Gastrointestinal: Positive for diarrhea  (chronic) and vomiting. Negative for nausea.   Genitourinary: Negative for decreased urine volume, difficulty urinating, dysuria, frequency and urgency.   Musculoskeletal: Negative for back pain.   Neurological: Positive for weakness (right-sided, unchanged).   Psychiatric/Behavioral: The patient is nervous/anxious.    All other systems reviewed and are negative.    Physical Exam     Patient Vitals for the past 24 hrs:   BP Temp Temp src Heart Rate Resp SpO2   12/16/17 2250 - - - 97 15 95 %   12/16/17 2230 128/79 - - - - -   12/16/17 2228 - - - 98 20 -   12/16/17 2215 (!) 126/97 - - - - -   12/16/17 2200 115/87 - - 92 - -   12/16/17 2145 119/74 - - 97 - -   12/16/17 2130 (!) 130/104 - - - - -   12/16/17 2115 115/86 - - 69 - -   12/16/17 2045 115/90 - - - - 97 %   12/16/17 2030 - - - - - 97 %   12/16/17 2026 - - - - - 98 %   12/16/17 2015 (!) 127/97 - - - - 95 %   12/16/17 1930 (!) 141/110 - - 97 - 96 %   12/16/17 1900 (!) 134/99 - - - - -   12/16/17 1845 (!) 138/106 - - 72 - 97 %   12/16/17 1837 (!) 127/121 98.6  F (37  C) Oral 78 18 99 %     Physical Exam  Constitutional:  Well developed, Well nourished, Completely comfortable and in no distress   HENT:  Bilateral external ears normal, Mucous membranes moist, Nose normal. Neck- Normal range of motion, Supple  Respiratory:  Respiration diminished. Diffuse, faint expiratory wheezes at times. No tachypnea. Increased work of breathing.  Cardiovascular:  Normal heart rate, Normal rhythm, No murmurs,    GI:  Bowel sounds normal, Soft, No tenderness,   Musculoskeletal:  Intact distal pulses, No edema, grossly unremarkable range of motion. No lower extremity edema or calf tenderness.  Integument:  Warm, Dry   Neurologic:  Alert, attentive and appropriately oriented, Chronic left facial droop and right motor weakness, unchanged per patient.  Psychiatric:  Mood and affect normal.     Emergency Department Course     ECG:  ECG taken at 1926, ECG read at 1935  Normal sinus  rhythm  Left atrial enlargement  Left axis deviation  Septal infarct, age undetermined  Prolonged QT  Abnormal ECG - New since 10/30/2017  Rate 97 bpm. DC interval 176 ms. QRS duration 82 ms. QT/QTc 416/528 ms. P-R-T axes 53 -37 36.    Imaging:  Radiology findings were communicated with the patient and his family who voiced understanding of the findings.    XR Chest 2 Views  Right PICC line with its tip in the low superior vena cava  and median sternotomy changes again noted.  Moderate cardiomegaly again noted. There is increased prominence of  the perihilar pulmonary vasculature and mildly increased interstitial  density in both lungs consistent with congestive failure with early or  mild pulmonary edema. There are no airspace opacities to suggest  pneumonia. There is no pleural effusion or pneumothorax.  NELSY ELKINS MD  Report per radiology.    Laboratory:  Laboratory findings were communicated with the patient and his family who voiced understanding of the findings.    CBC: WBC 9.8, HGB 10.3 (L),   BMP: WNL (Creatinine 1.10)  Troponin (Collected 1935): 0.574 (HH)  BNP: 07958 (H)    Interventions:  2046 Lasix 40 mg IV    Emergency Department Course:  Nursing notes and vitals reviewed.  I performed an exam of the patient as documented above.  IV was inserted and blood was drawn for laboratory testing, results above.  The patient was sent for a XR chest 2 views while in the emergency department, results above.    2037 I rechecked the patient and his family. He requests to be transferred to the [facility] ER for admission due to his CHF.  2105 I spoke with Dr. Muñoz of the cardiology service from the [facility] regarding patient's presentation, findings, and plan of care. I reviewed the patient's troponin findings from today and Dr. Muñoz felt that these were stable and likely due to CHF. She did not recommend any specific interventions. She agreed to accept the patient upon transfer to  the [facility].  I personally reviewed the ECG, imaging, and laboratory results with the patient and his family and answered all related questions prior to transfer.    Impression & Plan      Medical Decision Making:  Cricket Miguel is a 62 year old male with a PMH of aortic stenosis with ascending aortic aneurysm/CAD s/p aortic valve replacement and aortic root replacement with composite graft (April 2016), HFpEF, COPD, HTN, HLD who presents for evaluation of shortness of breath.  I considered a broad differential of their dyspnea including CHF exacerbation, PE, dissection, hemothorax, pleural effusion, pneumonia, acute coronary syndrome, reactive airway disease, bronchitis, upper airway obstruction, foreign body, etc.  Given the history and exam plus laboratory findings I feel congestive heart failure is the most likely etiology and would not do extensive workup for other causes as mentioned above at this time.  Troponin was elevated as discussed above; again, cardiology feels this is likely to CHF and did not have any specific recommendations for this at time, and patient is a symptomatic without any chest pain, dyspnea, dizziness.  The patient received IV diuretics in ED and remained stable; will start I/O's here in ED.  Will transfer to the [facility] at this time for further cares.  Patient and his family are comfortable with plan.        Diagnosis:    ICD-10-CM    1. Acute on chronic systolic congestive heart failure (H) I50.23        Disposition:  The patient was transferred to the [facility] under the care of Dr. Muñoz of the cardiology service.  Scribe Disclosure:  Quentin PIEDRA, am serving as a scribe at 7:12 PM on 12/16/2017 to document services personally performed by Karo Rubio MD based on my observations and the provider's statements to me.  [facility] EMERGENCY DEPARTMENT       Karo Rubio MD  12/17/17 0259

## 2017-12-17 NOTE — PROGRESS NOTES
Cardiology  Progress Note    Cricket Miguel ([id number]) admitted on 12/16/2017 12/17/2017    Subjective   No acute event overnight. Developed feeling of SOB around 10.30 am. No desaturation. Mentating well. Moved to ICU due to concerning echo finding  4 pt ROS negative except above    Objective   Most recent vital signs:  BP (!) 131/94  Temp 97.7  F (36.5  C) (Oral)  Resp 20  Wt 83 kg (182 lb 15.7 oz)  SpO2 95%  BMI 27.82 kg/m2  Temp:  [97.7  F (36.5  C)-98.6  F (37  C)] 97.7  F (36.5  C)  Heart Rate:  [] 103  Resp:  [15-20] 20  BP: (115-141)/() 131/94  SpO2:  [95 %-99 %] 95 %  Wt Readings from Last 2 Encounters:   12/16/17 83 kg (182 lb 15.7 oz)   12/15/17 83 kg (183 lb)       Intake/Output Summary (Last 24 hours) at 12/17/17 1351  Last data filed at 12/17/17 1313   Gross per 24 hour   Intake           401.87 ml   Output             1700 ml   Net         -1298.13 ml       Physical exam:  General: AAOx3, very pleasant, no clubbing/cyanosis, no edema, no JVD  HEENT:  Supple   Cardiac: RRR. No m/r/g. Normal S1, S2.   Pulm: CTAB, no wheezes, no crackles.  Abd: +BS, soft, non distended, non tender  Skin: No new rash/petechiae  MSK: No new deformities  Neuro:  Right hemiparesis    Labs (Past three days):  Reviewed and remarkable for: reviewed    Imaging/procedure results:   Reviewed and remarkable for: reviewed  Recent Results (from the past 48 hour(s))   XR Chest 2 Views    Narrative    CHEST TWO VIEW 12/16/2017 7:50 PM     COMPARISON: Frontal chest x-ray 12/2/17    HISTORY: Dyspnea.       Impression    IMPRESSION: Right PICC line with its tip in the low superior vena cava  and median sternotomy changes again noted.    Moderate cardiomegaly again noted. There is increased prominence of  the perihilar pulmonary vasculature and mildly increased interstitial  density in both lungs consistent with congestive failure with early or  mild pulmonary edema. There are no airspace opacities to  suggest  pneumonia. There is no pleural effusion or pneumothorax.    NELSY ELKINS MD   CT Chest/Abd/Pelvis Angio w Processing   Result Value    Radiologist flags (AA)     Cranial migration of the root graft/prosthetic valve    Narrative    EXAMINATION: CTA ANGIOGRAM CHEST/ABD/PELVIS W PROCESSING, 12/17/2017  11:31 AM    TECHNIQUE: Helical CT images from the thoracic inlet through the  symphysis pubis were obtained with contrast. Contrast dose: 100cc of  Isovue 370    COMPARISON: Chest radiograph 12/16/2017, PET CT 10/19/2017, CT  10/6/2017    HISTORY: S/p Bentall procedure w/ known aortic abscess    FINDINGS:    Chest:   Fluid collection vs aneurysm surrounding the aortic root, measuring  approximately 8.9 x 10.3 cm compared to 9.5 x 9.3 cm previously, This  fluid collection/aneurysm now diffusely inhomogeneously fills with  intravenous contrast, new since the previous exam. The fluid  collection or aneurysm of the aortic root remnant surrounding the  aortic root graft appears in communication with the subvalvular  portion aortic root graft (series 13, image 53) and may be a perigraft  leak into the native aortic root remnant. Postoperative changes of  aortic valvular and root replacement. Though it is possible that the  root graft has unseated from the left ventricle in the cranial  direction creating the leak into the root remnant. The ascending  aortic wall is poorly visualized in some areas, presumably  discontinuous given contrast filling of the periaortic fluid  collection (series 13, image 34). No dissection flap seen in the  thoracic aorta. No substantial aortic dilation.    The visualized thyroid parenchyma, aortic branching pattern,  pericardium, and esophagus are unremarkable. Scattered prominent  subcentimeter mediastinal lymph nodes with an enlarged 1.3 cm  subcarinal lymph node, similar to priors.    The central tracheal tree is patent. No pneumothorax, pleural  effusion, or focal airspace  consolidation. Mild upper lobe predominant  central lobular and paraseptal emphysema. Scattered calcified  granulomas.    Abdomen and pelvis:  The liver, gallbladder, spleen, and pancreas are unremarkable.  Scattered focal areas of scarring in the posterior right kidney. No  hydronephrosis. Mildly diffusely thickened adrenal glands without  focal mass. Enlarged prostate with diffuse bladder wall thickening and  punctate focus of gas within the bladder, presumably related to recent  catheterization. No intra-abdominal free air or free fluid.  Percutaneous gastrostomy tube. No dilated loops of bowel. The appendix  is normal. Moderate to severe atherosclerotic ossifications in the  normal caliber abdominal aorta. The major abdominal vasculature is  patent. No lymphadenopathy in the abdomen or pelvis.    Bones and soft tissues:  Prominent fat in the left inguinal canal. Multilevel degenerative  changes in the spine with prominent intravertebral disc herniations,  particularly in the superior vertebral endplate of L5. No acute or  worrisome osseous lesions.        Impression    IMPRESSION:   1. The periaortic fluid collection or aneurysm is slightly increased  in size, however there is new diffuse contrast opacification of this  area and suspected perigraft subvalvular leak into the native  ascending aortic remnant. This area also appears to communicate with  the left ventricle, and that the root graft with valve replacement has  migrated cranially and  from the LV.  2. Diffuse bladder wall thickening, which may be related to bladder  outlet obstruction or decompressed state.      [Critical Result: Cranial migration of the root graft/prosthetic valve  with separation from the left ventricle and resulting subvalvular leak    Finding was identified on 12/17/2017 11:41 AM.     Dr. Ma was contacted by Dr. Russo at 12/17/2017 12:08 PM and  verbalized understanding of the critical finding is new opacification  of  the periaortic fluid collection/aneurysm and communication with the  left ventricle. Dr. Russo contacted Dr. Ma at 1:15 PM on 12/17/2017  to clarify that the AV prosthesis and root graft has  from  the LV with a resulting subvalvular leak into the native ascending  aortic remnant which is slightly increased in size.    I have personally reviewed the examination and initial interpretation  and I agree with the findings.    SAMANTHA CHRISTOPHER MD       Assessment & Plan   Mr Miguel is a 62 year old male with a PMH of aortic stenosis with ascending aortic aneurysm/CAD s/p aortic valve replacement and aortic root replacement with composite graft (April 2016), HFpEF, COPD, HTN, HLD. He was found to have a large moises-aortic abscess around his replaced root & valve, and multiple strokes from septic emboli from the aorta on a recent admission in 10/2017. Was discharged to an LTAC recently, now presents with worsening shortness of breath and concern for severe aortic regurgiation on bedside echo.          Change today  - CVTSx consult, plan for Sx early this week  - nipride gtt to reduce after load  - transfer to ICU for potential crash  - ID consult to resume care and recs  - neurocheck, monitor signs of embolic stroke     # Acute vs. Subacute aortic regurgitation.    # Heart failure with reduced ejection fraction, valvular cardiomyopathy  Presenting with S&S of CHF with pulmonary edema on CXR, elevated BNP. Etiology concerning for progression of valvulopathy. Echo showing new severe AI requiring urgent surgical intervention. CVSx consulted and aware    - volume status: lasix 40 mg iv to help with pulmonary edema  - Afterload: nipride gtt goal MAP>70, SBP<120 done prophylactically to prevent symptomatic AI  - hold ACEi due to tenuous clinical status  - hold BB to allow tachycardia that would help AI physiology    # Recent CVA, embolic.    Baseline left sided facial droop, right sided weakness upper extremity > lower.  No new deficit  - neurocheck    # H/O MSSA bacteremia: Raisa-aortic abscess in setting of replaced aortic root and valve  Compliant with Abx, no signs of active infection. B/C x 2 on admission  - Continue Naficillin and Rifampin  - ID consult    # Type II MI: Troponin peaked at 0.574, now down trending.       # Anxiety/Depression  On multiple medications.  His wife would like an evaluation by pyscholog/pschiatry for further discussio nregarding these medications. In addition, his QTc is prolonged to 530 on admission  - psych consult  - hold seroquel  - tried olanzapine for agitation, No Hadol. May try ativan if desperate but concious also need to be monitored   - Continue Abilify 2 mg TID (okay with QTc). Continue Remeron.       FEN: cardiac diet, NPO AMN for potential intervention in am  DVT Prophylaxis: Hold heparin subQ, SCD  Code Status: Full Code  Disposition: surgery    Seen and discussed with Dr. Muñoz who agrees with above assessment and plan.  Sherry Peraza MD PGY2 Internal Medicine

## 2017-12-17 NOTE — PLAN OF CARE
"Problem: Cardiac: Heart Failure (Adult)  Goal: Signs and Symptoms of Listed Potential Problems Will be Absent, Minimized or Managed (Cardiac: Heart Failure)  Signs and symptoms of listed potential problems will be absent, minimized or managed by discharge/transition of care (reference Cardiac: Heart Failure (Adult) CPG).   Outcome: No Change  Neuro: Alert, O to self consistently, O to time/place intermittently. Expressive aphasia, gets frustrated when he says the wrong word. Periods of restlessness/agitation, per family \"He has at least one episode a day\". R sided weakness, L facial droop. Tongue deviation to the L. Pupils irregular L-2mm, R-3mm, round/brisk. Pt states he \"can't see hardly anything\". No hearing issues noted.   Per family: When patient needs to use the restroom, he gets agitated and may call out for his significant other Meghna.  Cardiac: Sinus tach 80-130s, no ectopy. Strict BP parameters, SBP <120, MAP >65 w/ nipride on/off throughout the day for tight control. Afebrile.   Resp: 2LNC, clear lung sounds. Pt states no SOB.  GI: Regular diet, PEG tube clamped & for medication administration. Diarrhea - BM x4 today - liquid/brn/loose.   : Voiding, incontinent at times. 20mg lasix given.   LADs: R PICC x3 w/ naficillin at 41.7ml/hr.   Integ: Skin breakdown area on scrotum - pink/blanchable. Barrier cream applied.     Family is hoping that the psych team can help come up with a consistent plan for patient's daily anxiety/depression meds and PRN agitation meds. They are worried that there is not a consistent plan for his current regimen and would like to discuss this.     Continue to closely monitor BP and update MD with changes.         "

## 2017-12-17 NOTE — H&P
[facility]   Cardiology 1  History and Physical      Date of Admission:  12/16/2017    Assessment & Plan    Mr Miguel is a 62 year old male with a PMH of aortic stenosis with ascending aortic aneurysm/CAD s/p aortic valve replacement and aortic root replacement with composite graft (April 2016), HFpEF, COPD, HTN, HLD. He was found to have a large moises-aortic abscess around his replaced root & valve, and multiple strokes from septic emboli from the aorta on a recent admission in 10/2017.  Was discharged to an LTAC recently, now presents with worsening shortness of breath and concern for severe aortic regurgiation on bedside echo.           Acute vs. Subacute aortic regurgitation.    Heart failure with reduced ejection fraction, valvular cardiomyopathy  H/O MSSA bacteremia: Moises-aortic abscess in setting of replaced aortic root and valve  Type II MI: Troponin peaked at .574, now down trending.    - volume status: Appears euvolemic following Lasix 40 mg IV x 1 at outside ER.  Diuretics PRN  - Labetalol 100 mg TID, Hold amlodipine and HCTZ.  Add on further afterload reduction as needed in the AM.  - Continue Naficillin and Rifampin.    - Trend CRP, Blood cultures x2 in the AM.   - Close monitoring of vitals and low threshold to transfer to the ICU.    - CVTS consult in the AM.    - Formal OWEN in the AM for further evaluation of valve.     Recent CVA, embolic.    - Baseline left sided facial droop, right sided weakness upper extremity > lower.    - Monitor neuro checks.      Anxiety/Depression  - On multiple medications.  His wife would like an evaluation by pyscholog/pschiatry for further discussio nregarding these medications. In addition, his QTc is prolonged to 530.   - Consider pyschiatry consultation in the AM to discuss patient/wife concerns regarding medications additionally possible change in medications given Qtc.   - Hold seroquel.    - Continue Abilify 2 mg TID (okay with QTc). Continue  Abrahan.      FEN: NPO for OWEN in the AM  DVT Prophylaxis: Hold heparin subQ, pending surgical evaluation.    Code Status: Full Code  Disposition: Expected discharge in >3 days pending further surgical evaluation for new aortic insufficiency.    Pt will be formally staffed in the AM, with the Cards 1 team.    Cong Lei  Internal Medicine PGY3      Primary Care Physician   Dipak Gordillo    Chief Complaint   Shortness of breath.      Pt and wife provide relevant recent history, remainder obtained from recent hospital records.      History of Present Illness   Cricket Miguel is a 62 year male with past medical history of aortic stenosis with ascending aortic root aneurysm who is status post aortic valve replacement and aortic root replacement with a composite graft in April 2016, mild COPD, history of heart failure with preserved ejection fraction, hypertension, hyperlipidemia who was recently admitted in October 2017 with altered mentation.  He was noted septic and infectious workup revealing for a large periaortic abscess around his replaced aortic root and valve this was leading to septic emboli resulting in CVA.  He has residual left-sided facial droop, and right-sided upper extremity weakness following that stroke.  He was discharged from the hospital and rehab at an acute care facility and recently discharged over the past week.  The plan was to have several months of IV antibiotics along with rehabilitation following a stroke and then proceeding to surgical placement and repair of the abscessed area.  The patient now presents with approximately 48 hour history of worsening shortness of breath at rest, orthopnea, and PND.  The wife and the patient reported that he was doing relatively well until the past 2-3 days.    The patient was seen at [facility] emergency room, he was noted to be volume overloaded and given Lasix 40 mg IV.  Chest x-ray was revealing for pulmonary edema.  His blood  pressure and heart rate were within normal limits and his basic labs were largely unremarkable.  Notable labs however where an elevated BNP and mild troponin elevation.    Past Medical History    I have reviewed this patient's medical history and updated it with pertinent information if needed.   Past Medical History:   Diagnosis Date     Abscess of aortic root 09/2017     AI (aortic insufficiency)      Anxiety      Aortic root dilatation (H)      AR (aortic regurgitation)     severe     Cardiomyopathy (H)      CHF (congestive heart failure), NYHA class II (H)      COPD, mild (H)     spirometry 12/16     CVA (cerebral vascular accident) (H) 09/2017    septic emboli - MSSA - cerebellum, Left MCA, Rt Occipital, Aphasia, Left visual field cut, moderate to severe encephalopathy     Depression 09/2017     Heart murmur      Hyperlipidemia LDL goal <70 10/31/2010     Hypertension goal BP (blood pressure) < 140/90      Inguinal hernia      left lung nodule  1/29/2008     Major depressive disorder, single episode, moderate (H)      Psoriasis childhood     Tobacco use disorder        Past Surgical History   I have reviewed this patient's surgical history and updated it with pertinent information if needed.  Past Surgical History:   Procedure Laterality Date     ARTHROSCOPY KNEE INCISION AND DRAINAGE Left 10/8/2017    Procedure: ARTHROSCOPY KNEE INCISION AND DRAINAGE;  Arthroscopic Incision and Drainage of Left Knee;  Surgeon: Lucretia Munoz MD;  Location: [facility] OR     HC SHOULDER ARTHROSCOPY, DX  2001    Arthroscopy, Shoulder RT Dr OSIRIS Andersen     HC SHOULDER ARTHROSCOPY, DX  1/04    LT arthroscopy Dr OSIRIS Andersen     HERNIA REPAIR, UMBILICAL  1/08    incarcerated - dr rockwell     HERNIORRHAPHY INGUINAL  12/21/2012    HERNIORRHAPHY INGUINAL;  Right Inguinal Hernia Repair with mesh ;  Surgeon: Mello Rockwell MD;  Location: [facility] OR     REPLACE VALVE AORTIC N/A 5/17/2016    REPLACE VALVE AORTIC - Dr Bowers     ROTATOR CUFF  REPAIR RT/LT  11/10    Rt arthroscopy - Dr OSIRIS Andersen     SURGICAL HISTORY OF -       AVR, severe AR, aortic root repair     TRACHEOSTOMY PERCUTANEOUS  10/27/2017            Prior to Admission Medications   Prior to Admission Medications   Prescriptions Last Dose Informant Patient Reported? Taking?   ARIPiprazole (ABILIFY) 2 MG tablet   Yes No   Sig: Take 3 tablets (6 mg) by mouth daily   D5W 1,000 mL with nafcillin 12 g continuous infusion   Yes No   Sig: Inject 12 g into the vein every 24 hours   Menthol-Zinc Oxide 0.44-20.6 % OINT   Yes No   Si application topically TID PRN for perianal irritation   Miconazole Nitrate 2 % POWD   Yes No   Sig: Apply in folds   QUEtiapine (SEROQUEL) 25 MG tablet   Yes No   Si tablet (25 mg) by Oral or Feeding Tube route 4 times daily as needed   White Petrolatum-Mineral Oil (SYSTANE NIGHTTIME) OINT   Yes No   acetaminophen (TYLENOL) 32 mg/mL solution   No No   Si.3 mLs (650 mg) by Oral or Feeding Tube route every 6 hours as needed for fever or mild pain   amLODIPine (NORVASC) 5 MG tablet   Yes No   Sig: Take 1 tablet (5 mg) by mouth daily   aspirin 81 MG chewable tablet   No No   Si tablet (81 mg) by Oral or Feeding Tube route daily   atorvastatin (LIPITOR) 40 MG tablet   No No   Sig: TAKE 1 TABLET(40 MG) BY MOUTH DAILY   famotidine (PEPCID) 40 MG/5ML suspension   No No   Sig: Take 2.5 mLs (20 mg) by mouth 2 times daily   fiber modular (NUTRISOURCE FIBER) packet   No No   Si packet by Per Feeding Tube route 3 times daily   hydrochlorothiazide (HYDRODIURIL) 25 MG tablet   Yes No   Sig: Take 0.5 tablets (12.5 mg) by mouth daily   ketotifen (ZADITOR/REFRESH ANTI-ITCH) 0.025 % SOLN ophthalmic solution   Yes No   Sig: Place 1 drop Into the left eye 3 times daily   labetalol (NORMODYNE) 100 MG tablet   Yes No   Sig: Take 1 tablet (100 mg) by mouth every 8 hours   lactobacillus TABS   Yes No   Sig: Take 1 tablet by mouth 3 times daily   loperamide (IMODIUM A-D) 2  MG tablet   Yes No   Si tab by g tube 3 times daily PRN for diarrhea   mirtazapine (REMERON) 7.5 MG TABS tablet   No No   Sig: GIVE 3 TABLETS(22.5MG) VIA G-TUBE AT BEDTIME   moxifloxacin (VIGAMOX) 0.5 % ophthalmic solution   Yes No   Sig: Place 1 drop Into the left eye 3 times daily   multivitamins with minerals (CERTAVITE/CEROVITE) LIQD liquid   No No   Sig: 15 mLs by Per Feeding Tube route daily   ondansetron (ZOFRAN) 4 MG tablet   No No   Sig: Take 1-2 tablets (4-8mg) every 8 hours as needed for nausea   polyethylene glycol (MIRALAX/GLYCOLAX) Packet   No No   Sig: Take 17 g by mouth daily as needed for constipation   potassium chloride (KLOR-CON) 20 MEQ Packet   Yes No   rifampin (REIFADEN) 25 mg/mL SUSP   No No   Si mLs (300 mg) by Oral or Feeding Tube route 2 times daily   venlafaxine (EFFEXOR) 100 MG tablet   No No   Sig: Take 1 tablet (100 mg) by mouth 2 times daily      Facility-Administered Medications: None     Allergies   Allergies   Allergen Reactions     Lisinopril Swelling     One-sided facial swelling after being on lisinopril/HCTZ for one week.        Social History   I have reviewed this patient's social history and updated it with pertinent information if needed. Cricket Miguel  reports that he quit smoking about 20 months ago. He has a 35.00 pack-year smoking history. He has never used smokeless tobacco. He reports that he drinks alcohol. He reports that he uses illicit drugs.    Family History   I have reviewed this patient's family history and updated it with pertinent information if needed.   Family History   Problem Relation Age of Onset     Genetic Disorder Mother      Bone plateover growth Agustin. shoulder surgeries     CANCER Mother      brain cancer     Alzheimer Disease Father        Review of Systems   The 10 point Review of Systems is negative other than noted in the HPI or here.     Physical Exam   Temp: 98  F (36.7  C) Temp src: Axillary BP: 131/89   Heart Rate: 99 Resp: 20  SpO2: 95 % O2 Device: None (Room air)    Vital Signs with Ranges  Temp:  [98  F (36.7  C)-98.6  F (37  C)] 98  F (36.7  C)  Heart Rate:  [69-99] 99  Resp:  [15-20] 20  BP: (115-141)/() 131/89  SpO2:  [95 %-99 %] 95 %  182 lbs 15.71 oz    GEN:  Alert, oriented x 3, appears comfortable, NAD.  HEENT:  Normocephalic/atraumatic, no scleral icterus, no nasal discharge, mouth moist.  CV:  Sinus tachycardia JVP is non-elevated   LUNGS:  Bibasilar crackles  ABD:  Active bowel sounds, soft, non-tender/non-distended.  No rebound/guarding/rigidity.  EXT:  No edema or cyanosis.  Hands/feet warm to touch with good signs of peripheral perfusion.   SKIN:  Dry to touch, no exanthems noted in the visualized areas.  NEURO:  Baseline left sided facial droop.  Right sided weakness upper extremity 3/5, 4/5 in lower extremity. Let side normal 5/5.  Otherwise no new focal changes.      Data   Data reviewed today:  I personally reviewed all the relevant, labs, imaging, and EKG    Recent Labs  Lab 12/17/17  0019 12/16/17  1935   WBC 11.2* 9.8   HGB 10.6* 10.3*    99    391   INR 1.06  --     137   POTASSIUM 3.7 3.9   CHLORIDE 101 101   CO2 27 28   BUN 10 10   CR 1.14 1.10   ANIONGAP 9 8   IZABELLA 8.9 8.6   * 99   TROPI 0.554* 0.574*       Recent Results (from the past 24 hour(s))   XR Chest 2 Views    Narrative    CHEST TWO VIEW 12/16/2017 7:50 PM     COMPARISON: Frontal chest x-ray 12/2/17    HISTORY: Dyspnea.       Impression    IMPRESSION: Right PICC line with its tip in the low superior vena cava  and median sternotomy changes again noted.    Moderate cardiomegaly again noted. There is increased prominence of  the perihilar pulmonary vasculature and mildly increased interstitial  density in both lungs consistent with congestive failure with early or  mild pulmonary edema. There are no airspace opacities to suggest  pneumonia. There is no pleural effusion or pneumothorax.    NELSY ELKINS MD

## 2017-12-17 NOTE — PROGRESS NOTES
CLINICAL NUTRITION SERVICES - ASSESSMENT NOTE     Nutrition Prescription    RECOMMENDATIONS FOR MDs/PROVIDERS TO ORDER:  Consider SLP to confirm recommended diet textures - last SLP not from [place] on 12/1 rec'd to discharge tp on DD2 w/ nectar thick liquids after VFSS    Malnutrition Status:    Unable to determine.    Recommendations already ordered by Registered Dietitian (RD):  None today    Future/Additional Recommendations:  1. If needs to use PEG for TF this admission, recommend TwoCal HN @ 50 mL/hr (1200 mL/day) to provide 2400 kcals (29 kcal/kg/day), 101 g PRO (1.2 g/kg/day), 840 mL H2O, 263 g CHO and 6 g Fiber daily.                                        -If taking no PO, add 1 pkt ProSource daily (40 kcal, 11 g PRO)    2. Consider 100 mg thiamine daily if EF <30%     REASON FOR ASSESSMENT  Cricket Miguel is a/an 62 year old male assessed by the dietitian for Admission Nutrition Risk Screen for tube feeding or parenteral nutrition and Provider Order - Nutrition Education - 2 g Na diet.    NUTRITION HISTORY  -Pt known to service from previous admission. Pt was on TFs last admission (initally via NDT --> PEG placed prior to d/c). Pt then went to [place] where pt started SLP trials and received PO diet. Pt was discharged home without TFs and on PO diet only.   -Per RN who spoke w/ family, pt was on a regular diet at home just PTA. Pt was using PEG for meds only.   -Per documents from [place], pt was not eating much as of 11/30 d/t not liking the textures of the food. TF was d/c on 11/23. Most recent regimen was Isosource 1.5 360 ml if meal intake <50% at meals + 1 pkt no CHO ProSource daily + benefiber 1 pkt BID. Pt was discharged on DD2 diet with nectar thick liquids   -Unable to speak w/ pt today d/t RN requesting RD to see pt at a later time (pt was yelling/upset)    CURRENT NUTRITION ORDERS  Diet: Low Saturated Fat/2400 mg Sodium, 2000 mL fluid restriction  Intake/Tolerance: no intakes yet  "recorded    LABS  Labs reviewed    MEDICATIONS  Medications reviewed    ANTHROPOMETRICS  Height: 5' 8\"  Most Recent Weight: 83 kg (182 lb 15.7 oz)    IBW: 70 kg  BMI: Overweight BMI 25-29.9  Weight History:   Per [place] documents: 181 lb(11/8), 183 lb(11/11), 182 lb(11/19). Pt was 85 kg when admitted to [place]. Pt was 88.7 kg on 10/19 per past [facility] RD records. Pt has lost 6.4% in 2 months.   Wt Readings from Last 10 Encounters:   12/16/17 83 kg (182 lb 15.7 oz)   12/15/17 83 kg (183 lb)   11/02/17 88.3 kg (194 lb 10.7 oz)   02/06/17 91.6 kg (202 lb)   01/09/17 87.5 kg (193 lb)   12/20/16 87.1 kg (192 lb)   11/30/16 89.4 kg (197 lb)   08/04/16 83.7 kg (184 lb 9.6 oz)   07/27/16 84.5 kg (186 lb 3.2 oz)   07/06/16 83 kg (183 lb)     Dosing Weight: 83 kg    ASSESSED NUTRITION NEEDS  Estimated Energy Needs: 2075 - 2490 kcals/day (25 - 30 kcals/kg)  Justification: Maintenance  Estimated Protein Needs: 100 - 125 grams protein/day (1.2 - 1.5 grams of pro/kg)  Justification: Hypercatabolism with acute illness and Repletion  Estimated Fluid Needs: 2000 mL  Justification: Fluid restriction    PHYSICAL FINDINGS  See malnutrition section below.  Edentulous, no dentures    MALNUTRITION  % Intake: Unable to assess  % Weight Loss: Weight loss does not quite meet criteria  Subcutaneous Fat Loss: Unable to assess  Muscle Loss: Unable to assess  Fluid Accumulation/Edema: Does not meet criteria (trace)  Malnutrition Diagnosis: Unable to determine due to unable to meet w/ pt    NUTRITION DIAGNOSIS  Predicted inadequate nutrient intake (kcal/PRO) related to recent reliance on TFs via PEG, recent dysphagia as evidenced by d/c'd home from rehab on PO diet only, unclear if PO intake meeting needs vs need for TFs via PEG this admission      INTERVENTIONS  Implementation  Nutrition Education: Unable to complete due to pt unavailable     Goals  Patient to consume % of nutritionally adequate meal trays TID, or the equivalent with " supplements/snacks.     Monitoring/Evaluation  Progress toward goals will be monitored and evaluated per protocol.    Maricel Childers RD, LD, [facility]  CVICU Dietitian  Pager: [phone]    Weekend JASON pager: [phone]

## 2017-12-17 NOTE — PROGRESS NOTES
Transfer  Transferred to: 4E  Via: bed  Reason for transfer: Pt inappropriate for 6C- worsening patient condition  Family: Aware of transfer, present  Belongings: Sent with pt  Chart: Sent with pt  Medications: Meds from bin sent with pt  Report called to: [facility] 4E  Patient transferred on monitor with float [facility] Retana. Had CT scan completed on the way to 4E.

## 2017-12-17 NOTE — PROGRESS NOTES
Admission: pt admitted approximately at  12 a.m. from  ER    Diagnosis:   Admitted for: SOB,fluid overload  Accompanied by: EMS,   Belongings: Placed in closet  Admission Profile: Complete  Teaching: orientation to unit, call don't fall, use of console, meal times, visiting hours, when to call for the RN (angina/sob/dizzyness, etc.), and enforced importance of safety   Access: PICC  Telemetry: Placed on patient  Height/Weight: Complete    ANKITA: Pt with h/y of AS, CAD,AVR, ARR(april 2016), SVA  with a right side paresis , admitted due to SOB,   SR/ST with HR at , /89, O2 sat at 98% on RA, denies any pain, nausea,palpitation,+1 BLE edema,  PEG tube in place,clamped.   Continues nafcillin gtt at 41.7 ml/hr , K 3.7, MD notified, INR 1.06. Adequate UOP. Repositioned e/y 2 hr.  Plan: continue to monitor,notify MD as needed. Pt on NPO for OWEN in a.m.

## 2017-12-17 NOTE — ED NOTES
Pt c/o SOB while laying down for past couple of nights but SOB worse today. Pt had stroke in October and was recently here in ICU. Pt also with PICC line, PEG tube, and continuous antibiotics for heart infection. Denies chest pain, cough, or fever. ABCs intact and A&Ox4. Family present.

## 2017-12-17 NOTE — PLAN OF CARE
Problem: Patient Care Overview  Goal: Plan of Care/Patient Progress Review  Outcome: No Change  Pt agitated, frustrated- Dr. serrano notified- came to beside to assess. zyprexa given. Nipride started- on and off to keep map > 70, systolic < 120.  Family updated at bedside. Plan for aortic valve replacement this week. Will continue to monitor and follow plan of care.

## 2017-12-17 NOTE — CONSULTS
History and Physical     Cricket Miguel MRN# [id number]   YOB: 1955 Age: 62 year old      Date of Admission:  12/16/2017        Chief Complaint:   CC: SOB         History of Present Illnes:     62M with PMH of aortic stenosis and ascending aortic aneurysm repair with history of sepsis of unknown origin in April 2016, found to have aortic stenosis and ascending aortic aneurysm. He is s/p Bentall procedure on 5/16 with Dr. Bowers. He was discharged to home in stable condition at that time. 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 He received a trach and PEG and was discharged to a rehab center and had been receiving IV antibiotics through a PICC in his R arm. He has risidual right sided UE weakness and left sided facial droop. He is able to swallow but also gets nutrition the PEG.  He was in his usual state of health undergoing rehabilitation when over the past 2 days he developed increasing shortness of breath. He went to [facility] ER today and was found to be volume overloaded. He received Lasix and was transferred here for further care. Initially there was a plan for him to have a graft replacement surgery in January. On echo tonight he appears to have severe aortic insufficiency with communication between the native aorta and the graft per cardiology. He has maintained his BPs but has had tachycardia to low 100s. Other VSS. Troponin and BNP elevated. Normal WBC.     Past Medical History:  Past Medical History:   Diagnosis Date     Abscess of aortic root 09/2017     AI (aortic insufficiency)      Anxiety      Aortic root dilatation (H)      AR (aortic regurgitation)     severe     Cardiomyopathy (H)      CHF (congestive heart failure), NYHA class II (H)       COPD, mild (H)     spirometry 12/16     CVA (cerebral vascular accident) (H) 09/2017    septic emboli - MSSA - cerebellum, Left MCA, Rt Occipital, Aphasia, Left visual field cut, moderate to severe encephalopathy     Depression 09/2017     Heart murmur      Hyperlipidemia LDL goal <70 10/31/2010     Hypertension goal BP (blood pressure) < 140/90      Inguinal hernia      left lung nodule  1/29/2008     Major depressive disorder, single episode, moderate (H)      Psoriasis childhood     Tobacco use disorder        Past Surgical History:  Past Surgical History:   Procedure Laterality Date     ARTHROSCOPY KNEE INCISION AND DRAINAGE Left 10/8/2017    Procedure: ARTHROSCOPY KNEE INCISION AND DRAINAGE;  Arthroscopic Incision and Drainage of Left Knee;  Surgeon: Lucretia Munoz MD;  Location: [facility] OR     HC SHOULDER ARTHROSCOPY, DX  2001    Arthroscopy, Shoulder RT Dr OSIRIS Andersen     HC SHOULDER ARTHROSCOPY, DX  1/04    LT arthroscopy Dr OSIRIS Andersen     HERNIA REPAIR, UMBILICAL  1/08    incarcerated - dr rockwell     HERNIORRHAPHY INGUINAL  12/21/2012    HERNIORRHAPHY INGUINAL;  Right Inguinal Hernia Repair with mesh ;  Surgeon: Mello Rockwell MD;  Location: [facility] OR     REPLACE VALVE AORTIC N/A 5/17/2016    REPLACE VALVE AORTIC - Dr Bowers     ROTATOR CUFF REPAIR RT/LT  11/10    Rt arthroscopy - Dr OSIRIS Andersen     SURGICAL HISTORY OF -   5/16    AVR, severe AR, aortic root repair     TRACHEOSTOMY PERCUTANEOUS  10/27/2017            Allergies:     Allergies   Allergen Reactions     Lisinopril Swelling     One-sided facial swelling after being on lisinopril/HCTZ for one week.        Medications:    Current Facility-Administered Medications on File Prior to Encounter:  [COMPLETED] furosemide (LASIX) injection 40 mg     Current Outpatient Prescriptions on File Prior to Encounter:  ARIPiprazole (ABILIFY) 2 MG tablet Take 3 tablets (6 mg) by mouth daily   hydrochlorothiazide (HYDRODIURIL) 25 MG tablet Take 0.5 tablets (12.5  mg) by mouth daily   labetalol (NORMODYNE) 100 MG tablet Take 1 tablet (100 mg) by mouth every 8 hours   D5W 1,000 mL with nafcillin 12 g continuous infusion Inject 12 g into the vein every 24 hours   famotidine (PEPCID) 40 MG/5ML suspension Take 2.5 mLs (20 mg) by mouth 2 times daily   acetaminophen (TYLENOL) 32 mg/mL solution 20.3 mLs (650 mg) by Oral or Feeding Tube route every 6 hours as needed for fever or mild pain   aspirin 81 MG chewable tablet 1 tablet (81 mg) by Oral or Feeding Tube route daily   fiber modular (NUTRISOURCE FIBER) packet 1 packet by Per Feeding Tube route 3 times daily   mirtazapine (REMERON) 7.5 MG TABS tablet GIVE 3 TABLETS(22.5MG) VIA G-TUBE AT BEDTIME   loperamide (IMODIUM A-D) 2 MG tablet 2 tab by g tube 3 times daily PRN for diarrhea   potassium chloride (KLOR-CON) 20 MEQ Packet    QUEtiapine (SEROQUEL) 25 MG tablet 1 tablet (25 mg) by Oral or Feeding Tube route 4 times daily as needed   venlafaxine (EFFEXOR) 100 MG tablet Take 1 tablet (100 mg) by mouth 2 times daily   ondansetron (ZOFRAN) 4 MG tablet Take 1-2 tablets (4-8mg) every 8 hours as needed for nausea   amLODIPine (NORVASC) 5 MG tablet Take 1 tablet (5 mg) by mouth daily   lactobacillus TABS Take 1 tablet by mouth 3 times daily   Menthol-Zinc Oxide 0.44-20.6 % OINT 1 application topically TID PRN for perianal irritation   Miconazole Nitrate 2 % POWD Apply in folds   moxifloxacin (VIGAMOX) 0.5 % ophthalmic solution Place 1 drop Into the left eye 3 times daily   ketotifen (ZADITOR/REFRESH ANTI-ITCH) 0.025 % SOLN ophthalmic solution Place 1 drop Into the left eye 3 times daily   White Petrolatum-Mineral Oil (SYSTANE NIGHTTIME) OINT    atorvastatin (LIPITOR) 40 MG tablet TAKE 1 TABLET(40 MG) BY MOUTH DAILY   rifampin (REIFADEN) 25 mg/mL SUSP 12 mLs (300 mg) by Oral or Feeding Tube route 2 times daily   polyethylene glycol (MIRALAX/GLYCOLAX) Packet Take 17 g by mouth daily as needed for constipation   multivitamins with minerals  (CERTAVITE/CEROVITE) LIQD liquid 15 mLs by Per Feeding Tube route daily       Social History:  Social History     Social History     Marital status:      Spouse name: N/A     Number of children: 3     Years of education: 12     Occupational History     Not on file.     Social History Main Topics     Smoking status: Former Smoker     Packs/day: 1.00     Years: 35.00     Quit date: 4/1/2016     Smokeless tobacco: Never Used      Comment: 4/2016     Alcohol use 0.0 oz/week     0 Standard drinks or equivalent per week      Comment: 1 drink per week avg, seldom     Drug use: Yes      Comment: marijuana- daily     Sexual activity: Yes     Partners: Female     Other Topics Concern     Caffeine Concern Yes     3 cups     Sleep Concern No     Special Diet No     trying to watch salt     Exercise Yes     walking     Seat Belt No     Parent/Sibling W/ Cabg, Mi Or Angioplasty Before 65f 55m? No     Social History Narrative       Family History:  Family History   Problem Relation Age of Onset     Genetic Disorder Mother      Bone plateover growth Agustin. shoulder surgeries     CANCER Mother      brain cancer     Alzheimer Disease Father        ROS:  The remainder of the complete ROS was negative unless noted in the HPI.    Exam:  /89  Temp 98  F (36.7  C) (Axillary)  Resp 20  Wt 83 kg (182 lb 15.7 oz)  SpO2 98%  BMI 27.82 kg/m2  General: interactive, NAD  HEENT: AT/NC, sclera anicteric  Neck: Supple, no JVD    Resp: clear to auscultation bilaterally   Cardiac: regular rate and rhythm   Abdomen: Soft, nontender, nondistended. +BS.  PEG in place, no erythema  Extremities: No LE edema  Skin: Warm and dry, no jaundice or rash  Neuro: Alert & oriented x 3, Cns 2-12 intact, moves all extremities equally    Labs:  Hg 10.6  WBC 11.2  Troponin 0.554  BNP 18508    Imaging:  Official ECHO pending    Assessment/ Plan: 62M with severe aortic insufficiency and known periaortic abscess. S/p Bentall procedure on 5/16, complicated  by large pariaortic abscess and CVA in October 2017 with risidual R sided weakness. Now with new severe aortic insufficiency due to aortic abscess.     -repeat  Echo with imaging uploading into system  -follow up repeat blood cultures  -medical management of aortic insufficiency for now, currently stable  -CT surgery team to see in AM    Discussed with Dr. Ma, cardiothoracic surgery fellow.    Gabriela Matamoros MD PGY2  General Surgery

## 2017-12-17 NOTE — PROVIDER NOTIFICATION
Patient called at approx. 1005 reporting sudden shortness of breath. Hypertensive, 130-140/. Cards 1 notified immediately by phone and came to assess patient, concerned about valve. Ordered for patient to transfer to ICU.

## 2017-12-17 NOTE — ED NOTES
Bed: ED27  Expected date: 12/16/17  Expected time: 6:13 PM  Means of arrival: Ambulance  Comments:  Samuel Wharton

## 2017-12-18 ENCOUNTER — MEDICAL CORRESPONDENCE (OUTPATIENT)
Dept: HEALTH INFORMATION MANAGEMENT | Facility: CLINIC | Age: 62
End: 2017-12-18

## 2017-12-18 ENCOUNTER — APPOINTMENT (OUTPATIENT)
Dept: SPEECH THERAPY | Facility: CLINIC | Age: 62
DRG: 219 | End: 2017-12-18
Attending: HOSPITALIST
Payer: COMMERCIAL

## 2017-12-18 ENCOUNTER — APPOINTMENT (OUTPATIENT)
Dept: ULTRASOUND IMAGING | Facility: CLINIC | Age: 62
DRG: 219 | End: 2017-12-18
Attending: INTERNAL MEDICINE
Payer: COMMERCIAL

## 2017-12-18 ENCOUNTER — APPOINTMENT (OUTPATIENT)
Dept: OCCUPATIONAL THERAPY | Facility: CLINIC | Age: 62
DRG: 219 | End: 2017-12-18
Attending: INTERNAL MEDICINE
Payer: COMMERCIAL

## 2017-12-18 LAB
ALBUMIN SERPL-MCNC: 1.5 G/DL (ref 3.4–5)
ALP SERPL-CCNC: 92 U/L (ref 40–150)
ALT SERPL W P-5'-P-CCNC: 30 U/L (ref 0–70)
ANION GAP SERPL CALCULATED.3IONS-SCNC: 7 MMOL/L (ref 3–14)
APTT PPP: 31 SEC (ref 22–37)
AST SERPL W P-5'-P-CCNC: 32 U/L (ref 0–45)
BASE EXCESS BLDV CALC-SCNC: 6.6 MMOL/L
BILIRUB SERPL-MCNC: 1.3 MG/DL (ref 0.2–1.3)
BUN SERPL-MCNC: 9 MG/DL (ref 7–30)
CALCIUM SERPL-MCNC: 8.6 MG/DL (ref 8.5–10.1)
CHLORIDE SERPL-SCNC: 102 MMOL/L (ref 94–109)
CO2 SERPL-SCNC: 29 MMOL/L (ref 20–32)
CREAT SERPL-MCNC: 1.26 MG/DL (ref 0.66–1.25)
ERYTHROCYTE [DISTWIDTH] IN BLOOD BY AUTOMATED COUNT: 16.9 % (ref 10–15)
GFR SERPL CREATININE-BSD FRML MDRD: 57 ML/MIN/1.7M2
GLUCOSE BLDC GLUCOMTR-MCNC: 105 MG/DL (ref 70–99)
GLUCOSE BLDC GLUCOMTR-MCNC: 117 MG/DL (ref 70–99)
GLUCOSE SERPL-MCNC: 109 MG/DL (ref 70–99)
HCO3 BLDV-SCNC: 32 MMOL/L (ref 21–28)
HCT VFR BLD AUTO: 33.4 % (ref 40–53)
HGB BLD-MCNC: 10.5 G/DL (ref 13.3–17.7)
INR PPP: 1.12 (ref 0.86–1.14)
INTERPRETATION ECG - MUSE: NORMAL
MAGNESIUM SERPL-MCNC: 2.5 MG/DL (ref 1.6–2.3)
MCH RBC QN AUTO: 31.5 PG (ref 26.5–33)
MCHC RBC AUTO-ENTMCNC: 31.4 G/DL (ref 31.5–36.5)
MCV RBC AUTO: 100 FL (ref 78–100)
O2/TOTAL GAS SETTING VFR VENT: ABNORMAL %
OXYHGB MFR BLDV: 64 %
PCO2 BLDV: 50 MM HG (ref 40–50)
PH BLDV: 7.42 PH (ref 7.32–7.43)
PLATELET # BLD AUTO: 423 10E9/L (ref 150–450)
PO2 BLDV: 37 MM HG (ref 25–47)
POTASSIUM SERPL-SCNC: 3.8 MMOL/L (ref 3.4–5.3)
PROT SERPL-MCNC: 7 G/DL (ref 6.8–8.8)
RBC # BLD AUTO: 3.33 10E12/L (ref 4.4–5.9)
SODIUM SERPL-SCNC: 138 MMOL/L (ref 133–144)
TROPONIN I SERPL-MCNC: 0.3 UG/L (ref 0–0.04)
WBC # BLD AUTO: 9.5 10E9/L (ref 4–11)

## 2017-12-18 PROCEDURE — 86900 BLOOD TYPING SEROLOGIC ABO: CPT | Performed by: SURGERY

## 2017-12-18 PROCEDURE — 25000128 H RX IP 250 OP 636: Performed by: STUDENT IN AN ORGANIZED HEALTH CARE EDUCATION/TRAINING PROGRAM

## 2017-12-18 PROCEDURE — 85027 COMPLETE CBC AUTOMATED: CPT | Performed by: INTERNAL MEDICINE

## 2017-12-18 PROCEDURE — 40000133 ZZH STATISTIC OT WARD VISIT: Performed by: OCCUPATIONAL THERAPIST

## 2017-12-18 PROCEDURE — 97530 THERAPEUTIC ACTIVITIES: CPT | Mod: GO | Performed by: OCCUPATIONAL THERAPIST

## 2017-12-18 PROCEDURE — 86850 RBC ANTIBODY SCREEN: CPT | Performed by: INTERNAL MEDICINE

## 2017-12-18 PROCEDURE — 25000132 ZZH RX MED GY IP 250 OP 250 PS 637: Performed by: INTERNAL MEDICINE

## 2017-12-18 PROCEDURE — 99221 1ST HOSP IP/OBS SF/LOW 40: CPT | Performed by: PSYCHIATRY & NEUROLOGY

## 2017-12-18 PROCEDURE — 86901 BLOOD TYPING SEROLOGIC RH(D): CPT | Performed by: INTERNAL MEDICINE

## 2017-12-18 PROCEDURE — 86901 BLOOD TYPING SEROLOGIC RH(D): CPT | Performed by: SURGERY

## 2017-12-18 PROCEDURE — 25000128 H RX IP 250 OP 636

## 2017-12-18 PROCEDURE — 97166 OT EVAL MOD COMPLEX 45 MIN: CPT | Mod: GO | Performed by: OCCUPATIONAL THERAPIST

## 2017-12-18 PROCEDURE — 00000146 ZZHCL STATISTIC GLUCOSE BY METER IP

## 2017-12-18 PROCEDURE — 20000004 ZZH R&B ICU UMMC

## 2017-12-18 PROCEDURE — 85730 THROMBOPLASTIN TIME PARTIAL: CPT | Performed by: INTERNAL MEDICINE

## 2017-12-18 PROCEDURE — 86850 RBC ANTIBODY SCREEN: CPT | Performed by: SURGERY

## 2017-12-18 PROCEDURE — 40000225 ZZH STATISTIC SLP WARD VISIT

## 2017-12-18 PROCEDURE — 25000125 ZZHC RX 250: Performed by: INTERNAL MEDICINE

## 2017-12-18 PROCEDURE — 86900 BLOOD TYPING SEROLOGIC ABO: CPT | Performed by: INTERNAL MEDICINE

## 2017-12-18 PROCEDURE — 83735 ASSAY OF MAGNESIUM: CPT | Performed by: INTERNAL MEDICINE

## 2017-12-18 PROCEDURE — 92610 EVALUATE SWALLOWING FUNCTION: CPT | Mod: GN

## 2017-12-18 PROCEDURE — 84484 ASSAY OF TROPONIN QUANT: CPT | Performed by: INTERNAL MEDICINE

## 2017-12-18 PROCEDURE — 25000128 H RX IP 250 OP 636: Performed by: INTERNAL MEDICINE

## 2017-12-18 PROCEDURE — 85610 PROTHROMBIN TIME: CPT | Performed by: INTERNAL MEDICINE

## 2017-12-18 PROCEDURE — 93970 EXTREMITY STUDY: CPT

## 2017-12-18 PROCEDURE — 25000132 ZZH RX MED GY IP 250 OP 250 PS 637: Performed by: HOSPITALIST

## 2017-12-18 PROCEDURE — 92526 ORAL FUNCTION THERAPY: CPT | Mod: GN

## 2017-12-18 PROCEDURE — 80053 COMPREHEN METABOLIC PANEL: CPT | Performed by: INTERNAL MEDICINE

## 2017-12-18 PROCEDURE — 25000132 ZZH RX MED GY IP 250 OP 250 PS 637: Performed by: STUDENT IN AN ORGANIZED HEALTH CARE EDUCATION/TRAINING PROGRAM

## 2017-12-18 PROCEDURE — 99232 SBSQ HOSP IP/OBS MODERATE 35: CPT | Mod: GC | Performed by: INTERNAL MEDICINE

## 2017-12-18 PROCEDURE — 86923 COMPATIBILITY TEST ELECTRIC: CPT | Performed by: INTERNAL MEDICINE

## 2017-12-18 PROCEDURE — 87040 BLOOD CULTURE FOR BACTERIA: CPT | Performed by: INTERNAL MEDICINE

## 2017-12-18 PROCEDURE — 82805 BLOOD GASES W/O2 SATURATION: CPT | Performed by: INTERNAL MEDICINE

## 2017-12-18 RX ORDER — MIRTAZAPINE 15 MG/1
30 TABLET, FILM COATED ORAL AT BEDTIME
Status: DISCONTINUED | OUTPATIENT
Start: 2017-12-18 | End: 2018-01-04 | Stop reason: HOSPADM

## 2017-12-18 RX ORDER — VANCOMYCIN HYDROCHLORIDE 1 G/200ML
1000 INJECTION, SOLUTION INTRAVENOUS SEE ADMIN INSTRUCTIONS
Status: DISCONTINUED | OUTPATIENT
Start: 2017-12-18 | End: 2017-12-19 | Stop reason: HOSPADM

## 2017-12-18 RX ORDER — QUETIAPINE FUMARATE 25 MG/1
25 TABLET, FILM COATED ORAL 4 TIMES DAILY PRN
Status: DISCONTINUED | OUTPATIENT
Start: 2017-12-18 | End: 2017-12-21

## 2017-12-18 RX ORDER — CEFAZOLIN SODIUM 2 G/100ML
2 INJECTION, SOLUTION INTRAVENOUS
Status: DISCONTINUED | OUTPATIENT
Start: 2017-12-19 | End: 2017-12-19 | Stop reason: HOSPADM

## 2017-12-18 RX ORDER — VANCOMYCIN HYDROCHLORIDE 1 G/200ML
1000 INJECTION, SOLUTION INTRAVENOUS
Status: DISCONTINUED | OUTPATIENT
Start: 2017-12-19 | End: 2017-12-19 | Stop reason: HOSPADM

## 2017-12-18 RX ORDER — FUROSEMIDE 10 MG/ML
20 INJECTION INTRAMUSCULAR; INTRAVENOUS ONCE
Status: COMPLETED | OUTPATIENT
Start: 2017-12-18 | End: 2017-12-18

## 2017-12-18 RX ORDER — LORAZEPAM 2 MG/ML
0.5 INJECTION INTRAMUSCULAR EVERY 6 HOURS PRN
Status: DISCONTINUED | OUTPATIENT
Start: 2017-12-18 | End: 2017-12-20

## 2017-12-18 RX ORDER — VENLAFAXINE 50 MG/1
50 TABLET ORAL 2 TIMES DAILY
Status: DISPENSED | OUTPATIENT
Start: 2017-12-18 | End: 2017-12-20

## 2017-12-18 RX ADMIN — VENLAFAXINE 100 MG: 100 TABLET ORAL at 00:53

## 2017-12-18 RX ADMIN — NAFCILLIN 12 G: 10 INJECTION, POWDER, FOR SOLUTION INTRAVENOUS at 01:00

## 2017-12-18 RX ADMIN — NAFCILLIN 12 G: 10 INJECTION, POWDER, FOR SOLUTION INTRAVENOUS at 23:35

## 2017-12-18 RX ADMIN — MIRTAZAPINE 30 MG: 15 TABLET, FILM COATED ORAL at 21:32

## 2017-12-18 RX ADMIN — VENLAFAXINE 50 MG: 50 TABLET ORAL at 20:55

## 2017-12-18 RX ADMIN — ASPIRIN 81 MG CHEWABLE TABLET 81 MG: 81 TABLET CHEWABLE at 08:50

## 2017-12-18 RX ADMIN — FAMOTIDINE 20 MG: 40 POWDER, FOR SUSPENSION ORAL at 20:55

## 2017-12-18 RX ADMIN — FUROSEMIDE 20 MG: 10 INJECTION, SOLUTION INTRAMUSCULAR; INTRAVENOUS at 09:02

## 2017-12-18 RX ADMIN — POTASSIUM CHLORIDE 20 MEQ: 1.5 POWDER, FOR SOLUTION ORAL at 06:44

## 2017-12-18 RX ADMIN — ATORVASTATIN CALCIUM 40 MG: 40 TABLET, FILM COATED ORAL at 08:50

## 2017-12-18 RX ADMIN — FUROSEMIDE 20 MG: 10 INJECTION, SOLUTION INTRAVENOUS at 06:45

## 2017-12-18 RX ADMIN — LORAZEPAM 0.5 MG: 2 INJECTION INTRAMUSCULAR; INTRAVENOUS at 21:32

## 2017-12-18 RX ADMIN — FUROSEMIDE 20 MG: 10 INJECTION, SOLUTION INTRAMUSCULAR; INTRAVENOUS at 16:19

## 2017-12-18 RX ADMIN — MINERAL OIL AND WHITE PETROLATUM: 150; 830 OINTMENT OPHTHALMIC at 21:32

## 2017-12-18 RX ADMIN — Medication 300 MG: at 08:50

## 2017-12-18 RX ADMIN — LORAZEPAM 0.5 MG: 2 INJECTION INTRAMUSCULAR; INTRAVENOUS at 05:57

## 2017-12-18 RX ADMIN — FAMOTIDINE 20 MG: 40 POWDER, FOR SUSPENSION ORAL at 08:50

## 2017-12-18 RX ADMIN — ARIPIPRAZOLE 2 MG: 1 SOLUTION ORAL at 08:50

## 2017-12-18 RX ADMIN — Medication 300 MG: at 20:55

## 2017-12-18 RX ADMIN — SODIUM NITROPRUSSIDE 0.3 MCG/KG/MIN: 25 INJECTION INTRAVENOUS at 21:33

## 2017-12-18 NOTE — PROGRESS NOTES
Cardiology  Progress Note    Cricket Miguel ([id number]) admitted on 12/16/2017 12/18/2017    Subjective   No acute events overnight  Afebrile  Hemodynamically stable  Updated wife at bedside this AM  Discussed ongoing surgical plan with CT surgery at rounds    Objective   Most recent vital signs:  BP (!) 86/66  Temp 98.9  F (37.2  C) (Axillary)  Resp 16  Wt 78 kg (171 lb 15.3 oz)  SpO2 98%  BMI 26.15 kg/m2  Temp:  [96.6  F (35.9  C)-99  F (37.2  C)] 98.9  F (37.2  C)  Heart Rate:  [] 95  Resp:  [14-22] 16  BP: ()/() 86/66  SpO2:  [90 %-100 %] 98 %  Wt Readings from Last 2 Encounters:   12/18/17 78 kg (171 lb 15.3 oz)   12/15/17 83 kg (183 lb)       Intake/Output Summary (Last 24 hours) at 12/17/17 1351  Last data filed at 12/17/17 1313   Gross per 24 hour   Intake           401.87 ml   Output             1700 ml   Net         -1298.13 ml       Physical exam:  General: AAOx3, very pleasant, no clubbing/cyanosis, no edema, no JVD  HEENT:  Supple   Cardiac: RRR. No m/r/g. Normal S1, S2.   Pulm: CTAB, no wheezes, no crackles.  Abd: +BS, soft, non distended, non tender  Skin: No new rash/petechiae  MSK: No new deformities  Neuro:  Right hemiparesis    Labs (Past three days):  Reviewed and remarkable for: reviewed    Imaging/procedure results:   Reviewed and remarkable for: reviewed  Recent Results (from the past 48 hour(s))   XR Chest 2 Views    Narrative    CHEST TWO VIEW 12/16/2017 7:50 PM     COMPARISON: Frontal chest x-ray 12/2/17    HISTORY: Dyspnea.       Impression    IMPRESSION: Right PICC line with its tip in the low superior vena cava  and median sternotomy changes again noted.    Moderate cardiomegaly again noted. There is increased prominence of  the perihilar pulmonary vasculature and mildly increased interstitial  density in both lungs consistent with congestive failure with early or  mild pulmonary edema. There are no airspace opacities to suggest  pneumonia. There is no  pleural effusion or pneumothorax.    NELSY ELKINS MD   CT Chest/Abd/Pelvis Angio w Processing   Result Value    Radiologist flags (AA)     Cranial migration of the root graft/prosthetic valve    Narrative    EXAMINATION: CTA ANGIOGRAM CHEST/ABD/PELVIS W PROCESSING, 12/17/2017  11:31 AM    TECHNIQUE: Helical CT images from the thoracic inlet through the  symphysis pubis were obtained with contrast. Contrast dose: 100cc of  Isovue 370    COMPARISON: Chest radiograph 12/16/2017, PET CT 10/19/2017, CT  10/6/2017    HISTORY: S/p Bentall procedure w/ known aortic abscess    FINDINGS:    Chest:   Fluid collection vs aneurysm surrounding the aortic root, measuring  approximately 8.9 x 10.3 cm compared to 9.5 x 9.3 cm previously, This  fluid collection/aneurysm now diffusely inhomogeneously fills with  intravenous contrast, new since the previous exam. The fluid  collection or aneurysm of the aortic root remnant surrounding the  aortic root graft appears in communication with the subvalvular  portion aortic root graft (series 13, image 53) and may be a perigraft  leak into the native aortic root remnant. Postoperative changes of  aortic valvular and root replacement. Though it is possible that the  root graft has unseated from the left ventricle in the cranial  direction creating the leak into the root remnant. The ascending  aortic wall is poorly visualized in some areas, presumably  discontinuous given contrast filling of the periaortic fluid  collection (series 13, image 34). No dissection flap seen in the  thoracic aorta. No substantial aortic dilation.    The visualized thyroid parenchyma, aortic branching pattern,  pericardium, and esophagus are unremarkable. Scattered prominent  subcentimeter mediastinal lymph nodes with an enlarged 1.3 cm  subcarinal lymph node, similar to priors.    The central tracheal tree is patent. No pneumothorax, pleural  effusion, or focal airspace consolidation. Mild upper lobe  predominant  central lobular and paraseptal emphysema. Scattered calcified  granulomas.    Abdomen and pelvis:  The liver, gallbladder, spleen, and pancreas are unremarkable.  Scattered focal areas of scarring in the posterior right kidney. No  hydronephrosis. Mildly diffusely thickened adrenal glands without  focal mass. Enlarged prostate with diffuse bladder wall thickening and  punctate focus of gas within the bladder, presumably related to recent  catheterization. No intra-abdominal free air or free fluid.  Percutaneous gastrostomy tube. No dilated loops of bowel. The appendix  is normal. Moderate to severe atherosclerotic ossifications in the  normal caliber abdominal aorta. The major abdominal vasculature is  patent. No lymphadenopathy in the abdomen or pelvis.    Bones and soft tissues:  Prominent fat in the left inguinal canal. Multilevel degenerative  changes in the spine with prominent intravertebral disc herniations,  particularly in the superior vertebral endplate of L5. No acute or  worrisome osseous lesions.        Impression    IMPRESSION:   1. The periaortic fluid collection or aneurysm is slightly increased  in size, however there is new diffuse contrast opacification of this  area and suspected perigraft subvalvular leak into the native  ascending aortic remnant. This area also appears to communicate with  the left ventricle, and that the root graft with valve replacement has  migrated cranially and  from the LV.  2. Diffuse bladder wall thickening, which may be related to bladder  outlet obstruction or decompressed state.      [Critical Result: Cranial migration of the root graft/prosthetic valve  with separation from the left ventricle and resulting subvalvular leak    Finding was identified on 12/17/2017 11:41 AM.     Dr. Ma was contacted by Dr. Russo at 12/17/2017 12:08 PM and  verbalized understanding of the critical finding is new opacification  of the periaortic fluid  collection/aneurysm and communication with the  left ventricle. Dr. Russo contacted Dr. Ma at 1:15 PM on 12/17/2017  to clarify that the AV prosthesis and root graft has  from  the LV with a resulting subvalvular leak into the native ascending  aortic remnant which is slightly increased in size.    I have personally reviewed the examination and initial interpretation  and I agree with the findings.    SAMANTHA CHRISTOPHER MD       Assessment & Plan   Mr Miguel is a 62 year old male with a PMH of aortic stenosis with ascending aortic aneurysm/CAD s/p aortic valve replacement and aortic root replacement with composite graft (April 2016), HFpEF, COPD, HTN, HLD. He was found to have a large moises-aortic abscess around his replaced root & valve, and multiple strokes from septic emboli from the aorta on a recent admission in 10/2017. Was discharged to an LTAC recently, now presents with worsening shortness of breath and concern for severe aortic regurgiation on bedside echo.          Change today  - OWEN potentially today, awaiting decision from CT surg  - Speech swallow evaluation     # Acute vs. Subacute aortic regurgitation.    # Periaortic root aneurysm  # Heart failure with preserved ejection fraction, valvular cardiomyopathy, Acute exacerbation  Continue gentle diuresis  Await final surgical plan per CT surg, tentative early this week  ID consulting  Continue nipride drip  Keep in ICU for nipride drip; goal SBP <120 mmHg  Continue rifampin/nafcillin for associated infection  CT surgery to obtain intraoperative cultures as detailed by ID for guidance  Continue lasix 20 mg IV BID    # Recent CVA, embolic.    Baseline left sided facial droop, right sided weakness upper extremity > lower. No new deficits    # H/O MSSA bacteremia: Moises-aortic abscess in setting of replaced aortic root and valve  Compliant with Abx, no signs of active infection. B/C x 2 on admission  - Continue Naficillin and Rifampin  - ID consulting,  appreciate recommendations    # Type II MI: Troponin peaked at 0.574, now down trending.  No further trending warranted in absence of symptoms.     # Anxiety/Depression  On multiple medications.  His wife would like an evaluation by pyscholog/pschiatry for further discussio nregarding these medications. In addition, his QTc is prolonged to 530 on admission  - psych consult ordered  - hold seroquel  - tried olanzapine for agitation, No Hadol. May try ativan if desperate but concious also need to be monitored   - Continue Abilify 2 mg TID (okay with QTc). Continue Remeron.       FEN: cardiac diet, NPO for now, awaiting surgical team decision on whether they would like a OWEN.  Afterwards, would get speech evaluation given history of stroke.  DVT Prophylaxis: Hold heparin subQ, SCD in anticipation for surgical intervention  Code Status: Full Code  Disposition: surgery    Seen and discussed with Dr. Becerra who agrees with above assessment and plan.    Bj Forrest MD  Cardiology Fellow    I very much appreciated the opportunity to see and assess Mr Cricket Miguel in the hospital with CV Fellow Dr Forrest and resident team.   Mr Miguel has a complex complication of previous valve surgery. He requires surgical intervention but he is aware that risk is high. Today will have OWEN    I agree with  the note above which summarizes my findings and current recommendations.  Please do not hesitate to contact my office if you have any questions or concerns.      Mello Becerra MD  Cardiac Arrhythmia Service  [facility]  [phone]

## 2017-12-18 NOTE — CONSULTS
Patient seen and chart reviewed. Note dictated (initial)   RECOMMENDATIONS:  May stop Abilify   Drop Effexor to 50 mg twice a day for 3 days, then d/c  Increase Remeron to 30 mg HS   Ok to continue Ativan 0.5 mg IV q 6 hours PRN anxiety   Seroquel 25 mg QID PRN anxiety, agitation   Haldol 1-2 mg IV q 4 hours for severe agitation.   Re-consult psychiatry as needed.

## 2017-12-18 NOTE — PROGRESS NOTES
12/18/17 1509   General Information   Onset Date 12/16/17   Start of Care Date 12/18/17   Referring Physician Dr. Forrest   Patient Profile Review/OT: Additional Occupational Profile Info See Profile for full history and prior level of function   Patient/Family Goals Statement Pt would like to eat and drink regular textures/consistencies   Swallowing Evaluation Bedside swallow evaluation   Behaviorial Observations Alert;Distractible   Mode of current nutrition NPO;PEG  (previously on regular diet this admission)   Respiratory Status O2 Supply   Type of O2 supply Nasal cannula   Comments Mr Miguel is a 62 year old male with a PMH of aortic stenosis with ascending aortic aneurysm/CAD s/p aortic valve replacement and aortic root replacement with composite graft (April 2016), HFpEF, COPD, HTN, HLD. He was found to have a large moises-aortic abscess around his replaced root & valve, and multiple strokes from septic emboli from the aorta on a recent admission in 10/2017. Was discharged to an LTAC recently, now presents with worsening shortness of breath and concern for severe aortic regurgiation on bedside echo.  Pt has signficant hx of SLP services per review of chart and information gathered during clinical interview. Per report, pt was discharged from LTACH with recommendations of dysphagia diet level 2 and nectar thick liquids, but has now progressed to regular diet and thin liquids. Significant other reports cutting food into bite sizes and providing cues/assist PRN.   Clinical Swallow Evaluation   Oral Musculature anomalies present  (L sided weakness from previous CVA)   Structural Abnormalities none present   Dentition edentulous, does not have dentures  (do not fit currently)   Mucosal Quality adequate   Mandibular Strength and Mobility intact   Oral Labial Strength and Mobility impaired retraction;impaired pursing;impaired seal  (L weakness)   Lingual Strength and Mobility impaired coordination;impaired left lateral  movement;impaired right lateral movement  (reduced ROM)   Velar Elevation intact   Laryngeal Function Cough;Throat clear;Voicing initiated   Additional Documentation Yes   Swallow Eval   Feeding Assistance frequent cues/help required   Clinical Swallow Eval: Thin Liquid Texture Trial   Mode of Presentation, Thin Liquids spoon;cup;straw   Volume of Liquid or Food Presented x2 ice chips, x2 tsp, x2 sip from cup, x8 sip from straw   Oral Phase of Swallow WFL   Pharyngeal Phase of Swallow coughing/choking   Successful Strategies Trialed During Procedure (straw placement on R side)   Diagnostic Statement positive s/s of aspiration x1 on initial sip (large) from straw marked by cough, no further s/s of aspiration with further trials   Clinical Swallow Eval: Puree Solid Texture Trial   Mode of Presentation, Puree spoon;fed by clinician   Volume of Puree Presented x4 tsp   Oral Phase, Puree WFL  (mild difficulty removing bolus from spoon )   Pharyngeal Phase, Puree (no overt clinical s/s of aspiration)   Diagnostic Statement no overt clinical s/s of aspiration; swallow appears functional for puree consistencies given clinical presentation   Clinical Swallow Eval: Semisolid Texture Trial   Mode of Presentation, Semisolid fed by clinician   Volume of Semisolid Food Presented x3 bites claudy cracker dipped in pudding   Oral Phase, Semisolid WFL   Pharyngeal Phase, Semisolid (no overt s/s of aspiration)   Diagnostic Statement no overt clinical s/s of aspiration; swallow appears functional for semi-solids given clinical presentation   Clinical Swallow Eval: Solid Food Texture Trial   Mode of Presentation, Solid self-fed   Volume of Solid Food Presented 1/2 claudy cracker    Oral Phase, Solid (slightly extended mastication)   Pharyngeal Phase, Solid (no overt clinical s/s of aspiration)   Diagnostic Statement no overt clinical s/s of aspiration; swallow appears functional for regular solid (in bite sizes) per clinical  presentation   Swallow Compensations   Swallow Compensations (place food on R side of mouth)   Esophageal Phase of Swallow   Patient reports or presents with symptoms of esophageal dysphagia No   General Therapy Interventions   Planned Therapy Interventions Dysphagia Treatment   Dysphagia treatment Modified diet education;Instruction of safe swallow strategies;Compensatory strategies for swallowing   Swallow Eval: Clinical Impressions   Skilled Criteria for Therapy Intervention Skilled criteria met.  Treatment indicated.   Functional Assessment Scale (FAS) 5   Treatment Diagnosis mild oral-pharyngeal dysphagia   Diet texture recommendations Regular diet;Thin liquids  (cut into bite sizes)   Recommended Feeding/Eating Techniques maintain upright posture during/after eating for 30 mins;small sips/bites;alternate between small bites and sips of food/liquid  (straw on R side of mouth)   Therapy Frequency 5 times/wk   Predicted Duration of Therapy Intervention (days/wks) 1 weeks   Anticipated Discharge Disposition home w/ home health   Risks and Benefits of Treatment have been explained. Yes   Patient, family and/or staff in agreement with Plan of Care Yes   Clinical Impression Comments Clinical swallow evaluation completed per MD order. Per clinical exam, pt presents with mild oral-pharyngeal dysphagia. Pt with hx of dysphagia from previous CVA and previous SLP services. Today's exam significant for left facial droop, left buccal weakness and reduced attention to task placing pt at elevated risk of aspiration. Recommend regular diet and thin liquids with tray set up. Please assist pt with cutting food into bite size pieces and provide assist PRN. Pt should chew on strong (R) side and drink small sips from straw placed on (R) side of mouth. SLP to follow to ensure PO tolerance and educate of safe swallow strategies.   Total Evaluation Time   Total Evaluation Time (Minutes) 20

## 2017-12-18 NOTE — CONSULTS
[facility] ID SERVICE: NEW CONSULTATION   Cricket Miguel : 1955 Sex: male:   Medical record number [id number]  Date of Admission: 17  Date of Service: 2017     REASON FOR CONSULTATION: History of MSSA prosthetic valve endocarditis, now with pseudoaneurysm and moises-graft leak.     PROBLEM LIST:   1. Worsening aortic insufficiency with presumed moises-graft leak in the setting of known periaortic abscess  2.  MSSA prosthetic aortic valve endocarditis diagnosed in 10/17 and treated with long-term nafcillin and rifampin  3. Left knee septic arthritis upon original presentation in 10/17  4. Multifocal acute infarctions involving the left parietotemporal lobes, left cerebellar hemisphere, and right occipital lobe presumed to be septic emboli    RECOMMENDATIONS:   1. Continue nafcillin and rifampin unchanged  2. At the time of surgery, please send tissue for aerobic, anaerobic, fungal, and AFB cultures. Please send tissue or fluid, NOT swabs.     DISCUSSION:   Mr. Miguel is a 62 year old man with known MSSA prosthetic valve endocarditis with perivalvular abscess.  There is no clear indication to date that he has any new or worsening infection.  Therefore, we will continue nafcillin and rifampin.  However, at the time of surgery would still send tissue for cultures as above.  It will be important to assess whether staph aureus still grows from the tissue since this will help to inform duration of therapy.  While unlikely, it is also important to rule out other pathogens so would also send anaerobic, AFB, and fungal cultures.    Thank you for this consult. The [facility] infectious disease team will continue to follow this patient. Dr. Voss will take over the service tomorrow.    Paz Cedillo MD  Infectious Diseases  [phone]       HPI:   The patient had been very agitated just before my visit and was resting.  Therefore the HPI was taken from chart review including portions of the  infectious disease consult done by Dr. Morgan Pereira in October 2017 with additional information provided by the patient's daughter and sister who were at the bedside.     Mr. Miguel is a 63 yo man with a history of severe severe aortic valve insufficiency and a 5 cm aortic root aneurysm and severe single vessel coronary artery disease involving an obtuse marginal branch. He underwent a Bentall procedure (aortic valve replacement and aortic root replacement with a composite graft constructed using a 27 mm St. Isidro Trifecta (bovine pericardial) valve and a 30 mm Mckenzie Cardioroot Valsalva tube graft), on 5/17/16.  He was discharged from the hospital with no complications nor signs of infection.  Prior to this surgery, he had had an episode of bacteremia that was diagnosed in [place] and [place] (he was traveling via  at the time).  It seems clear that the pathogen was staph aureus and that it was most likely MSSA.  Details of treatment are not known at this time the patient's partner will likely be bringing records tomorrow.      He [facility] to [facility] in October 2017 for evaluation altered mental status and knee pain.  He required intubation due to confusion and respiratory distress.  A CT was done and found a fluid collection with enhancing margin around the aortic root and ascending thoracic aorta concerning for dissection or infected fluid collection.  He is also noted to have possible right pyelonephritis and a tap of his painful knee was suggestive of infection.  He then also had a brain MRI which showed multifocal acute infarctions involving the left parietotemporal lobes, left cerebellar hemisphere, and right occipital lobe. He was found to have MSSA prosthetic valve endocarditis with presumed CNS embolic phenomena.  At discharge the plan was for a long course of antibiotics along with rehab followed by valve replacement in early 2018. Antibiotic plan was as follows: Levofloxacin 750 mg IV q 24 hours  until 11/04/17. Nafcillin 2 gm q4 hours until patient has aortic root surgery.  Minimum 3 months (01/07/18). Rifampin suspension 300 mg per feeding tube 2 times daily until patient has aortic root surgery.  Minimum 3 months (01/07/18).     The patient was discharged to [facility] and then to home.  However, over the past days to weeks, he developed worsening shortness of breath and was readmitted on 12/16/2017.  Unfortunately, imaging upon arrival showed a suspected perigraft subvalvular leak into the native ascending aortic remnant.  He currently remains on nafcillin and rifampin and surgery is planned early this week.    On admission he had a slight leukocytosis of 11.2 without neutrophilia but, interestingly, with some eosinophilia.  His CRP was 310 upon his original admission.  It had decreased to 30 at last check on October 23.  It is now 65.  Blood cultures from admission are pending and negative to date.      ROS:   A ten point review of systems was not obtained due to altered mental status,    PMH:   Past Medical History:   Diagnosis Date     Abscess of aortic root 09/2017     AI (aortic insufficiency)     severe     Anxiety      Aortic root dilatation (H)      AR (aortic regurgitation)     severe     Cardiomyopathy (H)      CHF (congestive heart failure), NYHA class II (H)      COPD, mild (H)     spirometry 12/16     CVA (cerebral vascular accident) (H) 09/2017    septic emboli - MSSA - cerebellum, Left MCA, Rt Occipital, Aphasia, Left visual field cut, moderate to severe encephalopathy     Depression 09/2017     Heart murmur      Hyperlipidemia LDL goal <70 10/31/2010     Hypertension goal BP (blood pressure) < 140/90      Inguinal hernia      left lung nodule  1/29/2008     Major depressive disorder, single episode, moderate (H)      Psoriasis childhood     Tobacco use disorder      Past Surgical History:   Procedure Laterality Date     ARTHROSCOPY KNEE INCISION AND DRAINAGE Left 10/8/2017    Procedure:  ARTHROSCOPY KNEE INCISION AND DRAINAGE;  Arthroscopic Incision and Drainage of Left Knee;  Surgeon: Lucretia Munoz MD;  Location: [facility] OR     HC SHOULDER ARTHROSCOPY, DX  2001    Arthroscopy, Shoulder RT Dr OSIRIS Andersen     HC SHOULDER ARTHROSCOPY, DX  1/04    LT arthroscopy Dr OSIRIS Andersen     HERNIA REPAIR, UMBILICAL  1/08    incarcerated - dr rockwell     HERNIORRHAPHY INGUINAL  12/21/2012    HERNIORRHAPHY INGUINAL;  Right Inguinal Hernia Repair with mesh ;  Surgeon: Mello Rockwell MD;  Location: [facility] OR     REPLACE VALVE AORTIC N/A 5/17/2016    REPLACE VALVE AORTIC - Dr Bowers     ROTATOR CUFF REPAIR RT/LT  11/10    Rt arthroscopy - Dr OSIRIS Andersen     SURGICAL HISTORY OF -   5/16    AVR, severe AR, aortic root repair     TRACHEOSTOMY PERCUTANEOUS  10/27/2017            SOCIAL HISTORY AND RISK FACTORS   Social History   Substance Use Topics     Smoking status: Former Smoker     Packs/day: 1.00     Years: 35.00     Quit date: 4/1/2016     Smokeless tobacco: Never Used      Comment: 4/2016     Alcohol use 0.0 oz/week     0 Standard drinks or equivalent per week      Comment: 1 drink per week avg, seldom     History   Sexual Activity     Sexual activity: Yes     Partners: Female     Lives with partner in [place], [place]; daughter in area. H/o tobacco used; quit in 4/2016     FAMILY HISTORY:   Reviewed and non-contributory  Family History   Problem Relation Age of Onset     Genetic Disorder Mother      Bone plateover growth Agustin. shoulder surgeries     CANCER Mother      brain cancer     Alzheimer Disease Father        EXAMINATION: (Recommend ? 9 systems; 2 items each)   BP (!) 88/61  Temp 98  F (36.7  C) (Axillary)  Resp 20  Wt 83 kg (182 lb 15.7 oz)  SpO2 90%  BMI 27.82 kg/m2  GENERAL:  well-developed, well-nourished, in bed in no acute distress  EYES:  Eyes have anicteric sclerae without conjunctival injection   ENT:  Atraumatic. oral intubation  NECK:  Supple  LUNGS:  Clear to auscultation bilateral.  Respiratory effort is normal  CARDIOVASCULAR:  Regular . Blowing murmur present.   ABDOMEN:  Normal bowel sounds, soft. PEG in place.   SKIN:  No acute rashes  NEUROLOGIC:  Sleeping through exam.   PSYCH: Unable to assess  CURRENT LINES: PICC    RELEVANT DATA:   Reviewed medicine test (PFTs, EKG, cardiac echo or cath): YES ECHO reviewed with cardiology team.     BASIC LABS   The following basic labs were personally reviewed:  Inflammatory Markers    Recent Labs   Lab Test  12/17/17   0730  10/23/17   0341  10/21/17   0357  10/21/17   0335  10/16/17   0323  10/09/17   0316  10/07/17   1333  10/06/17   1859  05/06/13   1703  02/02/11   1506   SED   --   122*  108*   --    --    --    --    --   6  8   CRP  65.0*  30.0*   --   44.0*  48.0*  240.0*  290.0*  310.0*  <5.0  9.6*       Hematology Studies    Recent Labs   Lab Test  12/17/17 2024 12/17/17   0019  12/16/17   1935  11/02/17   0324  11/01/17   0337  10/31/17   0350   10/06/17   1020   05/06/13   1703  12/04/12   1016  02/02/11   1506   WBC  10.3  11.2*  9.8  6.4  5.9  6.5   < >  17.1*   < >  10.6  11.9*  11.9*   ANEU   --   7.8  6.6   --    --    --    --   15.7*   --   7.2  7.9  8.2   AEOS   --   1.1*  1.0*   --    --    --    --   0.0   --   0.0  0.4  0.3   HGB  10.5*  10.6*  10.3*  9.1*  8.5*  8.4*   < >  15.0   < >  15.8  16.2  16.0   MCV  99  100  99  99  98  101*   < >  88   < >  88  89  89   PLT  428  420  391  201  217  229   < >  185   < >  315  259  315    < > = values in this interval not displayed.     Metabolic Studies     Recent Labs   Lab Test  12/17/17 2024 12/17/17 0730  12/17/17   0019  12/16/17   1935  11/02/17   1148  11/02/17   0324   NA  140  137  137  137   --   143   POTASSIUM  3.4  3.6  3.7  3.9  4.0  3.3*   CHLORIDE  102  102  101  101   --   106   CO2  30  27  27  28   --   30   BUN  10  10  10  10   --   24   CR  1.22  1.13  1.14  1.10   --   1.31*   GFRESTIMATED  60*  65  65  67   --   55*       Hepatic Studies    Recent Labs    Lab Test  12/17/17 2024  12/17/17   0019  11/01/17   0337  10/24/17   0405  10/21/17   0335  10/06/17   1020  06/03/16   1716  04/26/16   0654   12/04/12   1016   BILITOTAL  1.0  1.3   --    --    --   1.5*  0.3  0.6   --   0.4   ALKPHOS  94  102   --    --    --   85  104  91   --   85   ALBUMIN  1.6*  1.6*   --    --    --   2.8*  3.4  3.1*   --   4.0   AST  29  33  28  46*  52*  24  19  20   < >  41   ALT  32  34  52  42  35  33  32  26   < >  54    < > = values in this interval not displayed.       Thyroid Studies    Recent Labs   Lab Test 04/03/16 02/07/12   1214   TSH  0.945  1.14       MICROBIOLOGY LABS  The following microbiology studies were personally reviewed:  Culture Micro   Date Value Ref Range Status   12/17/2017 No growth after 6 hours  Preliminary   12/17/2017 No growth after 6 hours  Preliminary   10/21/2017 No growth  Final   10/21/2017 No growth  Final   10/20/2017 No growth  Final   10/20/2017 No growth  Final   10/19/2017 No growth  Final   10/19/2017 No growth  Final   10/18/2017 No growth  Final   10/18/2017 No growth  Final   10/17/2017 No growth  Final   10/17/2017 No growth  Final   10/16/2017 No growth  Final   10/16/2017 No growth  Final   10/15/2017 No growth  Final   10/15/2017 No growth  Final   10/14/2017 No growth  Final   10/14/2017 No growth  Final   10/13/2017 No growth  Final   10/13/2017 (A)  Final    Cultured on the 1st day of incubation:  Staphylococcus aureus  Susceptibility testing done on previous specimen     10/13/2017   Final    Hanna Ba R.N. on [id number] at 10:41am on 10.14.17 JT.   10/12/2017 No growth  Final   10/11/2017 (A)  Final    Cultured on the 1st day of incubation:  Staphylococcus aureus     10/11/2017   Final    Critical Value/Significant Value, preliminary result only, called to and read back by  Javier Nesbitt, KYRA 0136 10/12/17. MS     10/11/2017 Susceptibility testing done on previous specimen  Final   10/10/2017 (A)  Preliminary    Cultured on the 1st  day of incubation:  Staphylococcus aureus     10/10/2017   Preliminary    Critical Value/Significant Value, preliminary result only, called to and read back by  Esther Abdullahi RN, @9604 10/10/17.DH     10/10/2017   Preliminary    Susceptibility testing requested by   for Rifampin.10/18/17 at 0853.TV.     10/10/2017   Preliminary    (Note)  POSITIVE for STAPHYLOCOCCUS AUREUS and NEGATIVE for the mecA gene  (not MRSA) by Verigene multiplex nucleic acid test. The mecA gene was  not detected. Final identification and antimicrobial susceptibility  testing will be verified by standard methods.    Specimen tested with Verigene multiplex, gram-positive blood culture  nucleic acid test for the following targets: Staph aureus, Staph  epidermidis, Staph lugdunensis, other Staph species, Enterococcus  faecalis, Enterococcus faecium, Streptococcus species, S. agalactiae,  S. anginosus grp., S. pneumoniae, S. pyogenes, Listeria sp., mecA  (methicillin resistance) and Yvan/B (vancomycin resistance).    Critical Value/Significant Value called to and read back by Esther Abdullahi RN at 0132 [id number]     10/08/2017 No anaerobes isolated  Final   10/08/2017 No growth  Final   10/08/2017 Culture negative after 29 days  Final   10/06/2017 Light growth  Staphylococcus aureus   (A)  Final   10/06/2017   Final    Critical Value/Significant Value, preliminary result only, called to and read back by  Jodi Recio RN. 10.7.17 @ 1416. sb     10/06/2017 (A)  Final    On day 14, isolated in the broth only:   Probable  Anaerobic diptheroids     10/06/2017 Unable to isolate  Final   10/06/2017 (A)  Final    Cultured on the 1st day of incubation:  Staphylococcus aureus     10/06/2017   Final    Critical Value/Significant Value, preliminary result only, called to and read back by  Yarely Hull, KYRA, @ 5805 10.06.2017 BL     10/06/2017 Susceptibility testing done on previous specimen  Final   10/06/2017 (A)  Final    Cultured on the 1st day of  incubation:  Staphylococcus aureus     10/06/2017   Final    Critical Value/Significant Value, preliminary result only, called to and read back by  Yarely Hull RN on 10/6/2017 @2024, tk     10/06/2017   Final    (Note)  POSITIVE for STAPHYLOCOCCUS AUREUS and NEGATIVE for the mecA gene  (not MRSA) by Fligooigene multiplex nucleic acid test. The mecA gene was  not detected. Final identification and antimicrobial susceptibility  testing will be verified by standard methods.    Specimen tested with Verigene multiplex, gram-positive blood culture  nucleic acid test for the following targets: Staph aureus, Staph  epidermidis, Staph lugdunensis, other Staph species, Enterococcus  faecalis, Enterococcus faecium, Streptococcus species, S. agalactiae,  S. anginosus grp., S. pneumoniae, S. pyogenes, Listeria sp., mecA  (methicillin resistance) and Yvan/B (vancomycin resistance).    Critical Value/Significant Value called to and read back by Yarely Hull RN, @ 1782 10.06.2017 BL     04/25/2016 No growth  Final   04/25/2016 No growth  Final   10/02/2002 No growth  Final       Urine Studies    Recent Labs   Lab Test  10/06/17   1115  05/03/16   1110  12/04/12   1016  10/25/10   1350   LEUKEST  Negative  Negative  Negative  Negative   WBCU  <1  0  O - 2  O - 2       IMAGING RESULTS  12/17/2017 CT chest abdomen and pelvis:      IMPRESSION:   1. The periaortic fluid collection or aneurysm is slightly increased  in size, however there is new diffuse contrast opacification of this  area and suspected perigraft subvalvular leak into the native  ascending aortic remnant. This area also appears to communicate with  the left ventricle, and that the root graft with valve replacement has  migrated cranially and  from the LV.  2. Diffuse bladder wall thickening, which may be related to bladder  outlet obstruction or decompressed state.

## 2017-12-18 NOTE — PLAN OF CARE
Problem: Patient Care Overview  Goal: Plan of Care/Patient Progress Review  Outcome: No Change  D: AV abscess  I/A: Pt alert this morning. Disoriented to time/situation. Nipride drip continued this morning. Pt transferred to 4A for further care.  P: Continue to monitor neuro and cardiac status. Report given to 4A RN.

## 2017-12-18 NOTE — PROGRESS NOTES
[facility] ID SERVICE: ONGOING CONSULTATION   Cricket Miguel : 1955 Sex: male:   Medical record number [id number] Attending Physician: Tacho Muñoz MD  Date of Service: 2017    PROBLEM LIST:   - worsening aortic insufficiency with presumed moises-graft leak in the setting of a history of periaortic abscess  - MSSA bacteremia 2016, treated with 2 weeks of IV cefazolin. TTE negative.  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, currently on treatment with IV nafcillin and PO rifampin  - left knee MSSA septic arthritis 10/2017  - multifocal acute infarctions involving the left parietotemporal lobes, left cerebral hemisphere, and right occipital lobe presumed to be septic emboli    RECOMMENDATIONS:   - continue nafcillin 12g IV, continuous infusion  - continue rifampin 300mg PO BID  - At the time of surgery, please send tissue for aerobic, anaerobic, fungal, and AFB cultures. Please send tissue or fluid, NOT swabs  -obtain repeat blood cultures if febrile    DISCUSSION:   62 year old male with known 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 At this time, it's unclear if the dehiscence of the aortic graft is due to a) worsening, uncontrolled infection, or b) mechanical issue with the graft or valve itself. Intraoperative cultures will be essential in determining whether there is an ongoing infection. For now, recommend continuing the IV nafcillin and PO rifampin. If he spikes a fever, would get additional blood cultures however, at this time he does not need additional blood cultures.     Above discussed with Infectious Diseases Staff, Dr. Voss.  ID will continue to follow.      Kinjal Duarte MD  [facility] ID Fellow  [phone]      Attestation:  Cricket Miguel was seen in the hospital by Cynthia Voss on 2017, with the fellow, Dr. Layton  MD [facility] Infectious Diseases Fellow. I reviewed the history & exam. Assessment and plan were jointly made.  I agree with and have edited the fellow's note and plan of care.      I have reviewed today's vital signs, medications, labs and imaging.  Floor time: 25 minutes, Face-to-face time: 10 minutes, Total time: 35 minutes    Cynthia Voss MD.  ID Staff  [id number]         CHIEF INFECTIOUS DISEASES COMPLAINT:  History of MSSA prosthetic valve endocarditis, now with pseudoaneurysm and moises-graft leak    INTERVAL HISTORY:   Started on nipride to decreased BP. Afebrile. Family brought records from [place] confirming that he had a pan-sensitive MSSA bacteremia in early 4/2016 and was given 2 weeks of IV therapy. Mr Miguel at this time has no complaints, no rashes, GI upset, and is having 2 stools daily.     ROS: A five-point review of systems was obtained and was negative with the exception of that which is described above.  Allergies   Allergen Reactions     Lisinopril Swelling     One-sided facial swelling after being on lisinopril/HCTZ for one week.      Allergies were reviewed.    No current outpatient prescriptions on file.       CURRENT ANTI-INFECTIVES:   - nafcillin 12g IV continuous infusion 10/7/2017--present  - rifampin 300mg PO BID 10/19--present    EXAMINATION:   BP 95/66  Temp 98.3  F (36.8  C) (Axillary)  Resp 16  Wt 78 kg (171 lb 15.3 oz)  SpO2 97%  BMI 26.15 kg/m2  GENERAL:  Well-developed, well-nourished, in bed in no acute distress.   EYES:  Eyes have anicteric sclerae without conjunctival injection.  ENT:  Oropharynx is moist without exudates or ulcers.  NECK:  Supple. No cervical lymphadenopathy.  LUNGS:  Normal respiratory effort. Lung fields are clear to auscultation bilateral.  CARDIOVASCULAR:  Regular rate and rhythm with no murmurs, gallops or rubs.  ABDOMEN:  Normal bowel sounds, soft, nontender. PEG c/d/i  SKIN:  No acute rashes. Line(s) are in place without  any surrounding erythema or exudate.   NEUROLOGIC:  Left facial droop. Right sided weakness, Left side normal strenght  PSYCH: Appropriate affect. Alert and oriented to person, place and time.  CURRENT LINES: LUE PIV 12/17, PICC RUE 10/21, Gastrostomy tube 10/30/17    NEW DATA/RESULTS:   All interval basic labs, microbiology results and imaging were reviewed.    Culture Micro   Date Value Ref Range Status   12/17/2017 No growth after 18 hours  Preliminary   12/17/2017 No growth after 18 hours  Preliminary   10/21/2017 No growth  Final   10/21/2017 No growth  Final   10/20/2017 No growth  Final       Recent Labs   Lab Test  12/17/17   0730  10/23/17   0341  10/21/17   0335  10/16/17   0323  10/09/17   0316  10/07/17   1333   CRP  65.0*  30.0*  44.0*  48.0*  240.0*  290.0*     Recent Labs   Lab Test  12/18/17   0359  12/17/17 2024 12/17/17   0019  12/16/17 1935  11/02/17   0324  11/01/17   0337   WBC  9.5  10.3  11.2*  9.8  6.4  5.9     Recent Labs   Lab Test  12/18/17   0359  12/17/17 2024 12/17/17   0730  12/17/17   0019   CR  1.26*  1.22  1.13  1.14   GFRESTIMATED  57*  60*  65  65       Hematology Studies  Recent Labs   Lab Test  12/18/17   0359  12/17/17 2024 12/17/17   0019  12/16/17   1935  11/02/17   0324  11/01/17   0337   10/06/17   1020   05/06/13   1703  12/04/12   1016  02/02/11   1506   WBC  9.5  10.3  11.2*  9.8  6.4  5.9   < >  17.1*   < >  10.6  11.9*  11.9*   ANEU   --    --   7.8  6.6   --    --    --   15.7*   --   7.2  7.9  8.2   AEOS   --    --   1.1*  1.0*   --    --    --   0.0   --   0.0  0.4  0.3   HCT  33.4*  32.7*  34.0*  31.5*  29.5*  27.0*   < >  43.8   < >  46.2  47.4  46.8   PLT  423  428  420  391  201  217   < >  185   < >  315  259  315    < > = values in this interval not displayed.       Metabolic  Recent Labs   Lab Test  12/18/17   0359  12/17/17   2024  12/17/17   0730   NA  138  140  137   BUN  9  10  10   CO2  29  30  27   CR  1.26*  1.22  1.13   GFRESTIMATED  57*   60*  65       Hepatic Studies  Recent Labs   Lab Test  12/18/17   0359  12/17/17   2024  12/17/17   0019   BILITOTAL  1.3  1.0  1.3   ALKPHOS  92  94  102   ALBUMIN  1.5*  1.6*  1.6*   AST  32  29  33   ALT  30  32  34       Immunologlobulins  Recent Labs   Lab Test  10/23/17   0341  10/21/17   0357  05/06/13   1703   SED  122*  108*  6

## 2017-12-18 NOTE — TELEPHONE ENCOUNTER
Detailed VM left for [facility] intake asking if they have nutritionist available to see patient for consult.    RANJEET Crow, RN, PHN  [facility]) [phone]

## 2017-12-18 NOTE — PLAN OF CARE
Problem: Patient Care Overview  Goal: Plan of Care/Patient Progress Review    Discharge Planner SLP   Patient plan for discharge: wants to go home  Current status: Clinical swallow evaluation completed. Recommend regular diet and thin liquids with tray set up. Please assist pt with cutting food into bite size pieces and provide assist PRN. Pt should chew on strong (R) side and drink small sips from straw placed on (R) side of mouth.  Barriers to return to prior living situation: medical stability  Recommendations for discharge: resume HH vs. OP SLP services  Rationale for recommendations: aphasia, dysarthria, minimal dysphagia       Entered by: Daniela Lehman 12/18/2017 3:05 PM            no

## 2017-12-18 NOTE — PLAN OF CARE
Assessment: aortic abscess.   Neuro: Alert to self only, though difficult to assess d/t expressive aphasia. Calm and pleasant with bursts of agitation towards staff. Unable to redirect when patient becomes fixated on an idea. PRN Ativan given x2, zyprexa x1 with good results. R pupil greater than L pupil; R pupil more reactive than L. Follows commands, able to move RUE/LUE/LLE, RLE minimal movement.   Cardiac: SR, HR 80s, nipride titrated to keep SBP < 120 and MAPs > 70. Pulses: +2 b/l radial and pedal, no edema noted. Extremities warm, well perfused. CVP 12 (poor waveform via PICC).  Respiratory: LS coarse with come crackles noted. SpO2 > 95% on 2L NC, pt does desat to 80s on RA.   GI: No BM this shift. Pt kept NPO for possible surgery.   : Patient varies between incontinence and asking for urinal appropriately.  mL.   Misc: Lasix 20 mg given, K 40 meq replaced, Mg 2 g replaced.

## 2017-12-18 NOTE — PROGRESS NOTES
CV Surgery    Patient seen and examined, studies reviewed. Will proceed with redo sternotomy, groin bypass possible circulatory arrest, aortic root/aortic valve homograft tomorrow. Risks and benefits of the operation discussed with the patient and family and they agree to proceed.    Pili Bowers MD

## 2017-12-18 NOTE — PROGRESS NOTES
12/18/17 1516   Quick Adds   Type of Visit Initial Occupational Therapy Evaluation   Living Environment   Lives With significant other   Living Arrangements house  (all needs met on main floor)   Home Accessibility no concerns   Number of Stairs to Enter Home 0   Transportation Available family or friend will provide;other (see comments)  (medical transportation)   Living Environment Comment Pt's SO present and assisted with providing eval information as pt has significant dysarthria and expressive aphasia; pt lives with SO who has been serving as his full time caregiver since his strokes in Oct '17 (pt initially discharge to [facility] and then home); does have a HHA to assist with bathing   Self-Care   Dominant Hand left   Usual Activity Tolerance moderate   Current Activity Tolerance poor   Regular Exercise yes   Activity/Exercise Type other (see comments)  (home OT/PT/SLP)   Exercise Amount/Frequency 5-7 times/wk   Equipment Currently Used at Home hospital bed;lift device;wheelchair, manual;commode;other (see comments)  (kt bass)   Activity/Exercise/Self-Care Comment Pt SO reports pt is shelia dependent for transfers; in therapy pt was working on unsupported EOB sitting and had advanced to ~30-45 second bouts at a time   Functional Level Prior   Ambulation 4-->completely dependent   Transferring 3-->assistive equipment and person   Toileting 3-->assistive equipment and person   Bathing 2-->assistive person   Dressing 2-->assistive person   Eating 2-->assistive person   Communication 2-->difficulty speaking (not related to language barrier)   Cognition 1 - attention or memory deficits  (pt denies)   Fall history within last six months no   Prior Functional Level Comment Prior to illness this year, pt was (I) with all ADL/IADLs; since returning home most recently from hospital pt was dependent with functional transfers and needed assist with toileting, bathing, dressing, meals, and meds; pt is able to feed  "self and perform oral/facial cares however does sometimes request assist due to difficulty completing tasks   General Information   Onset of Illness/Injury or Date of Surgery - Date 12/16/17   Referring Physician Cong Lei MD   Patient/Family Goals Statement to go home   Additional Occupational Profile Info/Pertinent History of Current Problem Pt is a 62 year old male with a PMH of aortic stenosis with ascending aortic aneurysm/CAD s/p aortic valve replacement and aortic root replacement with composite graft (April 2016), HFpEF, COPD, HTN, HLD. He was found to have a large moises-aortic abscess around his replaced root & valve, and multiple strokes from septic emboli from the aorta on a recent admission in 10/2017. Was discharged to an LTAC recently, now presents with worsening shortness of breath and concern for severe aortic regurgiation on bedside echo   Precautions/Limitations fall precautions   Weight-Bearing Status - LUE full weight-bearing   Weight-Bearing Status - RUE full weight-bearing   Weight-Bearing Status - LLE full weight-bearing   Weight-Bearing Status - RLE full weight-bearing   Heart Disease Risk Factors High blood pressure;Dislipidemia;Medical history;Smoking   Cognitive Status Examination   Orientation person   Level of Consciousness alert;agitated   Able to Follow Commands success, 1-step commands  (90% of the time)   Personal Safety (Cognitive) moderate impairment   Attention Distractible during evaluation;Sustained attention impaired   Cognitive Comment Pt became increasingly agitated during session with additional questions, requests, repositioning, etc - eval modified as appropriate; pt aware that \"Sugar is coming\"; unable to state month though appears mostly due to aphasia as he appears to recognize \"December\" as correct option when writer provided for him   Visual Perception   Visual Perception Wears glasses  (all the time)   Visual Perception Comments Pt denies new concerns; pt's SO " reports that he has had increased blurriness since strokes especially in L eye due to impaired eyelid mobility   Sensory Examination   Sensory Quick Adds No deficits were identified   Pain Assessment   Patient Currently in Pain No   Range of Motion (ROM)   ROM Comment Pt with limited AROM in RUE (not formally assessed due to pt's agitation) - rests in flexor pattern against his torso; LUE WFL   Strength   Strength Comments Unable to formally test due to pt's agitation; limited movement in RUE; LUE appeared weak during ADLs   Mobility   Bed Mobility Bed mobility skill: Rolling/Turning   Bed Mobility Skill: Rolling/Turning   Level of Dickens - Bed Mobility Skill Rolling Turning maximum assist (25% patients effort)   Physical Assist/Nonphysical Assist verbal cues;1 person + 1 person to manage equipment   Transfer Skill: Bed to Chair/Chair to Bed   Level of Dickens: Bed to Chair dependent (less than 25% patients effort)   Physical Assist/Nonphysical Assist: Bed to Chair 2 persons   Assistive Device - Transfer Skill Bed to Chair Chair to Bed Rehab Eval (ceiling lift)   Activities of Daily Living Analysis   Impairments Contributing to Impaired Activities of Daily Living balance impaired;cognition impaired;coordination impaired;fear and anxiety;motor control impaired;muscle tone abnormal;flexibility decreased;postural control impaired;ROM decreased;strength decreased;sensory feedback impaired   General Therapy Interventions   Planned Therapy Interventions ADL retraining;IADL retraining;bed mobility training;cognition;fine motor coordination training;joint mobilization;manual therapy;motor coordination training;neuromuscular re-education;orthotic fitting/training;ROM;stretching;strengthening;transfer training;home program guidelines;progressive activity/exercise;visual perception;risk factor education   Clinical Impression   Criteria for Skilled Therapeutic Interventions Met yes, treatment indicated   OT Diagnosis  "decreased ADL/IADL (I)   Influenced by the following impairments weakness, impaired balance, cognitive deficits   Assessment of Occupational Performance 1-3 Performance Deficits   Identified Performance Deficits feeding, g/h   Clinical Decision Making (Complexity) Moderate complexity   Therapy Frequency daily   Predicted Duration of Therapy Intervention (days/wks) 14 days   Anticipated Discharge Disposition Home with Home Therapy;Transitional Care Facility;Acute Rehabilitation Facility   Risks and Benefits of Treatment have been explained. Yes   Patient, Family & other staff in agreement with plan of care Yes   [facility] TM \"6 Clicks\"   2016, Trustees of [facility], under license to [employer].  All rights reserved.   6 Clicks Short Forms Daily Activity Inpatient Short Form   [facility]  \"6 Clicks\" Daily Activity Inpatient Short Form   1. Putting on and taking off regular lower body clothing? 2 - A Lot   2. Bathing (including washing, rinsing, drying)? 2 - A Lot   3. Toileting, which includes using toilet, bedpan or urinal? 1 - Total   4. Putting on and taking off regular upper body clothing? 2 - A Lot   5. Taking care of personal grooming such as brushing teeth? 2 - A Lot   6. Eating meals? 2 - A Lot   Daily Activity Raw Score (Score out of 24.Lower scores equate to lower levels of function) 11   Total Evaluation Time   Total Evaluation Time (Minutes) 15     "

## 2017-12-18 NOTE — CONSULTS
DATE OF SERVICE:  2017      REQUESTING SOURCE:  Cardiology 1 team.      IDENTIFYING DATA:  Cricket Miguel is a 52-year-old man seen today for psychiatric consultation to weigh in on his current psychiatric medications in the context of prolonged QTc (It has been up to 531 in recent days).      HISTORY OF PRESENT ILLNESS:  This gentleman had presented with increasing shortness of breath in the context of being status post aortic valve replacement and suffering a CVA due to septic emboli in early October.  He had been participating in rehabilitation at [facility] and also at a transitional care unit, I believe.  It is a bit difficult for him to speak, but his girlfriend and 2 daughters were present during my interview to provide collateral information.  Apparently off and on since his stroke he has been quite agitated and his girlfriend said he will have what appear to be temper tantrums for about 5-6 hours at a time.  In talking with him about this he admits to being very frustrated by his physical limitations from the stroke.  Also, he does admit to feeling a bit down at times but denies being suicidal or hopeless.  He has history of some depression.  His wife  about 9 years ago and he was on some antidepressants for awhile.  Recently he has been having some anxiety as well and received 25 mg of Ativan last night which seemed to help quite a bit.  He has used Seroquel on a regular basis, which helps just modestly according to his girlfriend.      PAST PSYCHIATRIC HISTORY:  No psychiatric hospitalizations.  He has been on his current psychiatric medications since his hospitalization at [facility].  Also used antidepressants for awhile at the time his wife had .      He has not had a chemical dependency treatment that I am aware of.  Apparently he has been a regular cannabis smoker but very light drinker.  He quit smoking cigarettes last year.      PAST MEDICAL HISTORY:  Abscessed aortic root with associated  "dilatation and regurgitation, cardiomyopathy, status post CVA, hypertension, inguinal hernia.      PAST SURGICAL HISTORY:  He states the knee and shoulder surgeries, inguinal herniorrhaphy and aortic valve replacement.      ALLERGIES:  Lisinopril.      REVIEW OF SYSTEMS:  Ten-point review of systems today is completed and is negative except for slight headache and right-sided weakness.  He has difficulty talking due to his left-sided facial droop.      OUTPATIENT PSYCHIATRIC MEDICATIONS:   1.  Abilify 2 mg 3 tablets per day.   2.  Remeron 7.5 mg 3 at bedtime.   3.  Effexor 100 mg twice a day.   4.  Seroquel 25 mg 3 times per day.        The Effexor, Abilify and Remeron are being continued.  Seroquel was being held due to concerns over QTc.  He received several doses of lorazepam last night which seemed to help quite a bit.  He had 2 doses of 5 mg olanzapine last night for agitation.      FAMILY HISTORY:  According to his girlfriend, is negative for depression or significant psychiatric problems.      SOCIAL HISTORY:  He lives in [place].  He is working in business in a sheet metal fabrication.  He has 2 daughters and a son.  His wife  approximately 9 years ago and he has been with his current partner for about 6 years.  It appears he has a very supportive family.      MENTAL STATUS EXAMINATION:  He is lying quietly in bed, is noted to have left-sided facial droop which does lead to significant dysarthria.  There is no muscular rigidity or tremor present.  He is reasonably well groomed.  Associations tight.  Denies delusions and he had one hallucination of a bug in the room yesterday.  No paranoia.  Mood described as \"okay.\"  Affect is slightly restricted.  He is oriented to day of the week, place but not the date.  Recent and remote memory, attention span and concentration are slightly impaired.  Use of language, fund of knowledge within normal limits.  Insight and judgment fair.      VITAL SIGNS:  Blood " pressure 123/94, pulse 104, temperature 97.7.      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  His girlfriend is invested in having him on fewer psychiatric medications so I think this needs to be a priority.  Normally I would not use benzodiazepines in the context of delirium.  However, I think his delirium is quite mild and it certainly helped him settle down last night according to his girlfriend. Will need to continue to closely follow QTc.      RECOMMENDATIONS:   1.  He may discontinue the Abilify.   2.  Drop the Effexor to 50 mg twice a day for 3 days and then discontinue.   3.  Increase Remeron to 30 mg at bedtime.   4.  It is okay to continue the Ativan 0.5- 1.0 mg q.6 h. IV p.r.n. anxiety.   5.  Seroquel 25 mg q.i.d. p.r.n. anxiety and agitation.   6.  Haldol 1-2 mg IV q.4h. for severe agitation.   7.  Reconsult Psychiatry as needed.         CARMEN SANON MD             D: 2017 12:59   T: 2017 13:44   MT:       Name:     ASHTYN STROUD   MRN:      [id number]        Account:       [id number]   :      1955           Consult Date:  2017      Document: [id number]

## 2017-12-18 NOTE — PLAN OF CARE
Problem: Patient Care Overview  Goal: Plan of Care/Patient Progress Review  Discharge Planner OT   Patient plan for discharge: Home with continued home OT/PT/SLP and 24/7 assist from SO  Current status: OT eval completed and tx initiated. Pt oriented to self and partially to situation; difficult to fully assess cognition due to dysarthria and aphasia from prior stroke. Pt able to complete bed mobility with mod-max A; dependent for pericares and for transfer up to chair via liko lift. Pt able to feed self with min A after set-up. Pt's -109, pre /79, post /96, O2 sats 97-98%.  Barriers to return to prior living situation: weakness  Recommendations for discharge: Home with continued home OT/PT/SLP and 24/7 assist from SO  Rationale for recommendations: to increase pt's (I) with functional transfers and ADLs       Entered by: Deisy Alonzo 12/18/2017 4:26 PM

## 2017-12-19 ENCOUNTER — APPOINTMENT (OUTPATIENT)
Dept: GENERAL RADIOLOGY | Facility: CLINIC | Age: 62
DRG: 219 | End: 2017-12-19
Attending: THORACIC SURGERY (CARDIOTHORACIC VASCULAR SURGERY)
Payer: COMMERCIAL

## 2017-12-19 ENCOUNTER — ANESTHESIA EVENT (OUTPATIENT)
Dept: SURGERY | Facility: CLINIC | Age: 62
DRG: 219 | End: 2017-12-19
Payer: COMMERCIAL

## 2017-12-19 ENCOUNTER — ANESTHESIA (OUTPATIENT)
Dept: SURGERY | Facility: CLINIC | Age: 62
DRG: 219 | End: 2017-12-19
Payer: COMMERCIAL

## 2017-12-19 PROBLEM — I39 ENDOCARDITIS AND HEART VALVE DISORDERS IN DISEASES CLASSIFIED ELSEWHERE: Status: ACTIVE | Noted: 2017-12-19

## 2017-12-19 LAB
ANGLE RATE OF CLOT GROWTH: 21.5 DEG (ref 59–74)
ANGLE RATE OF CLOT GROWTH: 80 DEG (ref 59–74)
ANION GAP SERPL CALCULATED.3IONS-SCNC: 10 MMOL/L (ref 3–14)
ANION GAP SERPL CALCULATED.3IONS-SCNC: 7 MMOL/L (ref 3–14)
APTT PPP: 37 SEC (ref 22–37)
APTT PPP: 43 SEC (ref 22–37)
APTT PPP: 52 SEC (ref 22–37)
BASE DEFICIT BLDA-SCNC: 0.9 MMOL/L
BASE DEFICIT BLDA-SCNC: 1.8 MMOL/L
BASE EXCESS BLDA CALC-SCNC: 0.9 MMOL/L
BASE EXCESS BLDA CALC-SCNC: 1.4 MMOL/L
BASE EXCESS BLDA CALC-SCNC: 1.6 MMOL/L
BASE EXCESS BLDA CALC-SCNC: 1.9 MMOL/L
BASE EXCESS BLDA CALC-SCNC: 2.3 MMOL/L
BASE EXCESS BLDA CALC-SCNC: 2.5 MMOL/L
BASE EXCESS BLDA CALC-SCNC: 2.8 MMOL/L
BASE EXCESS BLDA CALC-SCNC: 2.9 MMOL/L
BASE EXCESS BLDA CALC-SCNC: 3 MMOL/L
BASE EXCESS BLDA CALC-SCNC: 3.4 MMOL/L
BASE EXCESS BLDA CALC-SCNC: 3.6 MMOL/L
BASE EXCESS BLDA CALC-SCNC: 3.6 MMOL/L
BASE EXCESS BLDA CALC-SCNC: 4.3 MMOL/L
BASE EXCESS BLDV CALC-SCNC: 2.8 MMOL/L
BASE EXCESS BLDV CALC-SCNC: 3.8 MMOL/L
BASE EXCESS BLDV CALC-SCNC: 4.2 MMOL/L
BLD PROD TYP BPU: NORMAL
BLD UNIT ID BPU: 0
BLOOD PRODUCT CODE: NORMAL
BPU ID: NORMAL
BUN SERPL-MCNC: 10 MG/DL (ref 7–30)
BUN SERPL-MCNC: 9 MG/DL (ref 7–30)
CA-I BLD-MCNC: 3.3 MG/DL (ref 4.4–5.2)
CA-I BLD-MCNC: 3.4 MG/DL (ref 4.4–5.2)
CA-I BLD-MCNC: 4 MG/DL (ref 4.4–5.2)
CA-I BLD-MCNC: 4.1 MG/DL (ref 4.4–5.2)
CA-I BLD-MCNC: 4.1 MG/DL (ref 4.4–5.2)
CA-I BLD-MCNC: 4.5 MG/DL (ref 4.4–5.2)
CA-I BLD-MCNC: 4.6 MG/DL (ref 4.4–5.2)
CA-I BLD-MCNC: 4.6 MG/DL (ref 4.4–5.2)
CA-I BLD-MCNC: 4.7 MG/DL (ref 4.4–5.2)
CALCIUM SERPL-MCNC: 8 MG/DL (ref 8.5–10.1)
CALCIUM SERPL-MCNC: 8.6 MG/DL (ref 8.5–10.1)
CHLORIDE SERPL-SCNC: 100 MMOL/L (ref 94–109)
CHLORIDE SERPL-SCNC: 109 MMOL/L (ref 94–109)
CI HYPERCOAGULATION INDEX: 5.8 RATIO (ref 0–3)
CI HYPOCOAGULATION INDEX: 13.6 RATIO (ref 0–3)
CLOT LYSIS 30M P MA LENFR BLD TEG: 0 % (ref 0–8)
CLOT LYSIS 30M P MA LENFR BLD TEG: 0 % (ref 0–8)
CLOT STRENGTH BLD TEG: 17.1 KD/SC (ref 5.3–13.2)
CLOT STRENGTH BLD TEG: 8.1 KD/SC (ref 5.3–13.2)
CO2 SERPL-SCNC: 29 MMOL/L (ref 20–32)
CO2 SERPL-SCNC: 29 MMOL/L (ref 20–32)
CREAT SERPL-MCNC: 1.16 MG/DL (ref 0.66–1.25)
CREAT SERPL-MCNC: 1.38 MG/DL (ref 0.66–1.25)
ERYTHROCYTE [DISTWIDTH] IN BLOOD BY AUTOMATED COUNT: 16.6 % (ref 10–15)
ERYTHROCYTE [DISTWIDTH] IN BLOOD BY AUTOMATED COUNT: 17.4 % (ref 10–15)
FIBRINOGEN PPP-MCNC: 273 MG/DL (ref 200–420)
FIBRINOGEN PPP-MCNC: 280 MG/DL (ref 200–420)
FIBRINOGEN PPP-MCNC: 338 MG/DL (ref 200–420)
GFR SERPL CREATININE-BSD FRML MDRD: 52 ML/MIN/1.7M2
GFR SERPL CREATININE-BSD FRML MDRD: 63 ML/MIN/1.7M2
GLUCOSE BLD-MCNC: 112 MG/DL (ref 70–99)
GLUCOSE BLD-MCNC: 118 MG/DL (ref 70–99)
GLUCOSE BLD-MCNC: 140 MG/DL (ref 70–99)
GLUCOSE BLD-MCNC: 142 MG/DL (ref 70–99)
GLUCOSE BLD-MCNC: 161 MG/DL (ref 70–99)
GLUCOSE BLD-MCNC: 166 MG/DL (ref 70–99)
GLUCOSE BLD-MCNC: 166 MG/DL (ref 70–99)
GLUCOSE BLD-MCNC: 168 MG/DL (ref 70–99)
GLUCOSE BLD-MCNC: 172 MG/DL (ref 70–99)
GLUCOSE BLD-MCNC: 185 MG/DL (ref 70–99)
GLUCOSE BLD-MCNC: 188 MG/DL (ref 70–99)
GLUCOSE BLD-MCNC: 190 MG/DL (ref 70–99)
GLUCOSE BLD-MCNC: 194 MG/DL (ref 70–99)
GLUCOSE BLD-MCNC: 212 MG/DL (ref 70–99)
GLUCOSE BLDC GLUCOMTR-MCNC: 139 MG/DL (ref 70–99)
GLUCOSE BLDC GLUCOMTR-MCNC: 171 MG/DL (ref 70–99)
GLUCOSE BLDC GLUCOMTR-MCNC: 174 MG/DL (ref 70–99)
GLUCOSE BLDC GLUCOMTR-MCNC: 175 MG/DL (ref 70–99)
GLUCOSE BLDC GLUCOMTR-MCNC: 190 MG/DL (ref 70–99)
GLUCOSE SERPL-MCNC: 112 MG/DL (ref 70–99)
GLUCOSE SERPL-MCNC: 142 MG/DL (ref 70–99)
GRAM STN SPEC: NORMAL
HBA1C MFR BLD: 5.3 % (ref 4.3–6)
HCO3 BLD-SCNC: 22 MMOL/L (ref 21–28)
HCO3 BLD-SCNC: 26 MMOL/L (ref 21–28)
HCO3 BLD-SCNC: 27 MMOL/L (ref 21–28)
HCO3 BLD-SCNC: 28 MMOL/L (ref 21–28)
HCO3 BLD-SCNC: 29 MMOL/L (ref 21–28)
HCO3 BLDV-SCNC: 28 MMOL/L (ref 21–28)
HCO3 BLDV-SCNC: 29 MMOL/L (ref 21–28)
HCO3 BLDV-SCNC: 30 MMOL/L (ref 21–28)
HCT VFR BLD AUTO: 29 % (ref 40–53)
HCT VFR BLD AUTO: 34 % (ref 40–53)
HGB BLD-MCNC: 10.3 G/DL (ref 13.3–17.7)
HGB BLD-MCNC: 10.7 G/DL (ref 13.3–17.7)
HGB BLD-MCNC: 6.9 G/DL (ref 13.3–17.7)
HGB BLD-MCNC: 7.1 G/DL (ref 13.3–17.7)
HGB BLD-MCNC: 7.3 G/DL (ref 13.3–17.7)
HGB BLD-MCNC: 7.6 G/DL (ref 13.3–17.7)
HGB BLD-MCNC: 7.7 G/DL (ref 13.3–17.7)
HGB BLD-MCNC: 8 G/DL (ref 13.3–17.7)
HGB BLD-MCNC: 8.1 G/DL (ref 13.3–17.7)
HGB BLD-MCNC: 8.2 G/DL (ref 13.3–17.7)
HGB BLD-MCNC: 8.5 G/DL (ref 13.3–17.7)
HGB BLD-MCNC: 8.6 G/DL (ref 13.3–17.7)
HGB BLD-MCNC: 9.4 G/DL (ref 13.3–17.7)
HGB BLD-MCNC: 9.6 G/DL (ref 13.3–17.7)
HGB BLD-MCNC: 9.7 G/DL (ref 13.3–17.7)
INR PPP: 0.81 (ref 0.86–1.14)
INR PPP: 0.9 (ref 0.86–1.14)
INR PPP: 1.27 (ref 0.86–1.14)
INR PPP: 1.56 (ref 0.86–1.14)
K TIME TO SPEC CLOT STRENGTH: 0.8 MIN (ref 1–3)
K TIME TO SPEC CLOT STRENGTH: 4.6 MIN (ref 1–3)
LACTATE BLD-SCNC: 0.7 MMOL/L (ref 0.7–2)
LACTATE BLD-SCNC: 0.8 MMOL/L (ref 0.7–2)
LACTATE BLD-SCNC: 0.9 MMOL/L (ref 0.7–2)
LACTATE BLD-SCNC: 1.1 MMOL/L (ref 0.7–2)
LACTATE BLD-SCNC: 1.2 MMOL/L (ref 0.7–2)
LACTATE BLD-SCNC: 1.2 MMOL/L (ref 0.7–2)
LACTATE BLD-SCNC: 1.3 MMOL/L (ref 0.7–2)
LACTATE BLD-SCNC: 1.6 MMOL/L (ref 0.7–2)
LACTATE BLD-SCNC: 1.8 MMOL/L (ref 0.7–2)
LACTATE BLD-SCNC: 2.5 MMOL/L (ref 0.7–2)
LACTATE BLD-SCNC: 3.6 MMOL/L (ref 0.7–2)
LY60 LYSIS AT 60 MINUTES: 0 % (ref 0–15)
LY60 LYSIS AT 60 MINUTES: 1.4 % (ref 0–15)
Lab: 1
Lab: 2
Lab: 3
MA MAXIMUM CLOT STRENGTH: 61.9 MM (ref 55–74)
MA MAXIMUM CLOT STRENGTH: 77.4 MM (ref 55–74)
MAGNESIUM SERPL-MCNC: 2.2 MG/DL (ref 1.6–2.3)
MAGNESIUM SERPL-MCNC: 2.5 MG/DL (ref 1.6–2.3)
MCH RBC QN AUTO: 30.9 PG (ref 26.5–33)
MCH RBC QN AUTO: 31.6 PG (ref 26.5–33)
MCHC RBC AUTO-ENTMCNC: 31.5 G/DL (ref 31.5–36.5)
MCHC RBC AUTO-ENTMCNC: 32.4 G/DL (ref 31.5–36.5)
MCV RBC AUTO: 100 FL (ref 78–100)
MCV RBC AUTO: 95 FL (ref 78–100)
NUM BPU REQUESTED: 10
NUM BPU REQUESTED: 4
NUM BPU REQUESTED: 8
O2/TOTAL GAS SETTING VFR VENT: 100 %
O2/TOTAL GAS SETTING VFR VENT: 100 %
O2/TOTAL GAS SETTING VFR VENT: 50 %
O2/TOTAL GAS SETTING VFR VENT: 50 %
O2/TOTAL GAS SETTING VFR VENT: 60 %
O2/TOTAL GAS SETTING VFR VENT: 60 %
O2/TOTAL GAS SETTING VFR VENT: 70 %
O2/TOTAL GAS SETTING VFR VENT: 80 %
O2/TOTAL GAS SETTING VFR VENT: ABNORMAL %
O2/TOTAL GAS SETTING VFR VENT: NORMAL %
OXYHGB MFR BLD: 97 % (ref 92–100)
OXYHGB MFR BLD: 97 % (ref 92–100)
OXYHGB MFR BLDV: 54 %
OXYHGB MFR BLDV: 68 %
PCO2 BLD: 33 MM HG (ref 35–45)
PCO2 BLD: 40 MM HG (ref 35–45)
PCO2 BLD: 40 MM HG (ref 35–45)
PCO2 BLD: 41 MM HG (ref 35–45)
PCO2 BLD: 42 MM HG (ref 35–45)
PCO2 BLD: 46 MM HG (ref 35–45)
PCO2 BLD: 46 MM HG (ref 35–45)
PCO2 BLD: 47 MM HG (ref 35–45)
PCO2 BLD: 47 MM HG (ref 35–45)
PCO2 BLD: 48 MM HG (ref 35–45)
PCO2 BLD: 48 MM HG (ref 35–45)
PCO2 BLD: 50 MM HG (ref 35–45)
PCO2 BLD: 51 MM HG (ref 35–45)
PCO2 BLD: 51 MM HG (ref 35–45)
PCO2 BLD: 56 MM HG (ref 35–45)
PCO2 BLDV: 44 MM HG (ref 40–50)
PCO2 BLDV: 45 MM HG (ref 40–50)
PCO2 BLDV: 49 MM HG (ref 40–50)
PH BLD: 7.31 PH (ref 7.35–7.45)
PH BLD: 7.32 PH (ref 7.35–7.45)
PH BLD: 7.35 PH (ref 7.35–7.45)
PH BLD: 7.35 PH (ref 7.35–7.45)
PH BLD: 7.37 PH (ref 7.35–7.45)
PH BLD: 7.38 PH (ref 7.35–7.45)
PH BLD: 7.41 PH (ref 7.35–7.45)
PH BLD: 7.41 PH (ref 7.35–7.45)
PH BLD: 7.43 PH (ref 7.35–7.45)
PH BLD: 7.44 PH (ref 7.35–7.45)
PH BLD: 7.44 PH (ref 7.35–7.45)
PH BLD: 7.45 PH (ref 7.35–7.45)
PH BLD: 7.45 PH (ref 7.35–7.45)
PH BLDV: 7.39 PH (ref 7.32–7.43)
PH BLDV: 7.41 PH (ref 7.32–7.43)
PH BLDV: 7.42 PH (ref 7.32–7.43)
PHOSPHATE SERPL-MCNC: 3.3 MG/DL (ref 2.5–4.5)
PLATELET # BLD AUTO: 217 10E9/L (ref 150–450)
PLATELET # BLD AUTO: 283 10E9/L (ref 150–450)
PLATELET # BLD AUTO: 294 10E9/L (ref 150–450)
PLATELET # BLD AUTO: 303 10E9/L (ref 150–450)
PLATELET # BLD AUTO: 438 10E9/L (ref 150–450)
PO2 BLD: 117 MM HG (ref 80–105)
PO2 BLD: 156 MM HG (ref 80–105)
PO2 BLD: 209 MM HG (ref 80–105)
PO2 BLD: 327 MM HG (ref 80–105)
PO2 BLD: 340 MM HG (ref 80–105)
PO2 BLD: 353 MM HG (ref 80–105)
PO2 BLD: 354 MM HG (ref 80–105)
PO2 BLD: 354 MM HG (ref 80–105)
PO2 BLD: 448 MM HG (ref 80–105)
PO2 BLD: 455 MM HG (ref 80–105)
PO2 BLD: 491 MM HG (ref 80–105)
PO2 BLD: 581 MM HG (ref 80–105)
PO2 BLD: 599 MM HG (ref 80–105)
PO2 BLD: 62 MM HG (ref 80–105)
PO2 BLD: 79 MM HG (ref 80–105)
PO2 BLDV: 29 MM HG (ref 25–47)
PO2 BLDV: 36 MM HG (ref 25–47)
PO2 BLDV: 69 MM HG (ref 25–47)
POTASSIUM BLD-SCNC: 2.5 MMOL/L (ref 3.4–5.3)
POTASSIUM BLD-SCNC: 2.6 MMOL/L (ref 3.4–5.3)
POTASSIUM BLD-SCNC: 2.6 MMOL/L (ref 3.4–5.3)
POTASSIUM BLD-SCNC: 3 MMOL/L (ref 3.4–5.3)
POTASSIUM BLD-SCNC: 3.1 MMOL/L (ref 3.4–5.3)
POTASSIUM BLD-SCNC: 3.1 MMOL/L (ref 3.4–5.3)
POTASSIUM BLD-SCNC: 3.4 MMOL/L (ref 3.4–5.3)
POTASSIUM BLD-SCNC: 3.5 MMOL/L (ref 3.4–5.3)
POTASSIUM BLD-SCNC: 3.7 MMOL/L (ref 3.4–5.3)
POTASSIUM BLD-SCNC: 3.8 MMOL/L (ref 3.4–5.3)
POTASSIUM BLD-SCNC: 4.1 MMOL/L (ref 3.4–5.3)
POTASSIUM BLD-SCNC: 4.1 MMOL/L (ref 3.4–5.3)
POTASSIUM BLD-SCNC: 4.2 MMOL/L (ref 3.4–5.3)
POTASSIUM BLD-SCNC: 4.2 MMOL/L (ref 3.4–5.3)
POTASSIUM SERPL-SCNC: 3.3 MMOL/L (ref 3.4–5.3)
POTASSIUM SERPL-SCNC: 3.4 MMOL/L (ref 3.4–5.3)
R TIME UNTIL CLOT FORMS: 2.6 MIN (ref 4–9)
R TIME UNTIL CLOT FORMS: 20.3 MIN (ref 4–9)
RBC # BLD AUTO: 3.04 10E12/L (ref 4.4–5.9)
RBC # BLD AUTO: 3.39 10E12/L (ref 4.4–5.9)
SODIUM BLD-SCNC: 137 MMOL/L (ref 133–144)
SODIUM BLD-SCNC: 138 MMOL/L (ref 133–144)
SODIUM BLD-SCNC: 139 MMOL/L (ref 133–144)
SODIUM BLD-SCNC: 139 MMOL/L (ref 133–144)
SODIUM BLD-SCNC: 140 MMOL/L (ref 133–144)
SODIUM BLD-SCNC: 140 MMOL/L (ref 133–144)
SODIUM BLD-SCNC: 143 MMOL/L (ref 133–144)
SODIUM SERPL-SCNC: 138 MMOL/L (ref 133–144)
SODIUM SERPL-SCNC: 145 MMOL/L (ref 133–144)
SPECIMEN SOURCE: NORMAL
TRANSFUSION STATUS PATIENT QL: NORMAL
WBC # BLD AUTO: 11.2 10E9/L (ref 4–11)
WBC # BLD AUTO: 16.1 10E9/L (ref 4–11)

## 2017-12-19 PROCEDURE — 93503 INSERT/PLACE HEART CATHETER: CPT

## 2017-12-19 PROCEDURE — 94002 VENT MGMT INPAT INIT DAY: CPT

## 2017-12-19 PROCEDURE — 85384 FIBRINOGEN ACTIVITY: CPT | Performed by: INTERNAL MEDICINE

## 2017-12-19 PROCEDURE — 93010 ELECTROCARDIOGRAM REPORT: CPT | Performed by: INTERNAL MEDICINE

## 2017-12-19 PROCEDURE — 25000128 H RX IP 250 OP 636

## 2017-12-19 PROCEDURE — 27210794 ZZH OR GENERAL SUPPLY STERILE: Performed by: SURGERY

## 2017-12-19 PROCEDURE — 93005 ELECTROCARDIOGRAM TRACING: CPT

## 2017-12-19 PROCEDURE — 4A133B3 MONITORING OF ARTERIAL PRESSURE, PULMONARY, PERCUTANEOUS APPROACH: ICD-10-PCS | Performed by: SURGERY

## 2017-12-19 PROCEDURE — 80048 BASIC METABOLIC PNL TOTAL CA: CPT | Performed by: THORACIC SURGERY (CARDIOTHORACIC VASCULAR SURGERY)

## 2017-12-19 PROCEDURE — 27210447 ZZH PACK CELL SAVER CSP: Performed by: SURGERY

## 2017-12-19 PROCEDURE — P9041 ALBUMIN (HUMAN),5%, 50ML: HCPCS

## 2017-12-19 PROCEDURE — 40000048 ZZH STATISTIC DAILY SWAN MONITORING

## 2017-12-19 PROCEDURE — P9041 ALBUMIN (HUMAN),5%, 50ML: HCPCS | Performed by: SURGERY

## 2017-12-19 PROCEDURE — 25000128 H RX IP 250 OP 636: Performed by: NEUROLOGICAL SURGERY

## 2017-12-19 PROCEDURE — 25000132 ZZH RX MED GY IP 250 OP 250 PS 637: Performed by: INTERNAL MEDICINE

## 2017-12-19 PROCEDURE — 82805 BLOOD GASES W/O2 SATURATION: CPT | Performed by: THORACIC SURGERY (CARDIOTHORACIC VASCULAR SURGERY)

## 2017-12-19 PROCEDURE — 36000074 ZZH SURGERY LEVEL 6 1ST 30 MIN - UMMC: Performed by: SURGERY

## 2017-12-19 PROCEDURE — 25000128 H RX IP 250 OP 636: Performed by: THORACIC SURGERY (CARDIOTHORACIC VASCULAR SURGERY)

## 2017-12-19 PROCEDURE — 25000125 ZZHC RX 250: Performed by: INTERNAL MEDICINE

## 2017-12-19 PROCEDURE — 83735 ASSAY OF MAGNESIUM: CPT | Performed by: INTERNAL MEDICINE

## 2017-12-19 PROCEDURE — 82803 BLOOD GASES ANY COMBINATION: CPT | Performed by: ANESTHESIOLOGY

## 2017-12-19 PROCEDURE — 85027 COMPLETE CBC AUTOMATED: CPT | Performed by: INTERNAL MEDICINE

## 2017-12-19 PROCEDURE — 84295 ASSAY OF SERUM SODIUM: CPT | Performed by: ANESTHESIOLOGY

## 2017-12-19 PROCEDURE — 87075 CULTR BACTERIA EXCEPT BLOOD: CPT | Performed by: SURGERY

## 2017-12-19 PROCEDURE — 25000132 ZZH RX MED GY IP 250 OP 250 PS 637: Performed by: SURGERY

## 2017-12-19 PROCEDURE — 41000019 ZZH PERA-PERFUSION EACH ADDTL 15 MIN: Performed by: SURGERY

## 2017-12-19 PROCEDURE — 82947 ASSAY GLUCOSE BLOOD QUANT: CPT | Performed by: ANESTHESIOLOGY

## 2017-12-19 PROCEDURE — 25000125 ZZHC RX 250: Performed by: SURGERY

## 2017-12-19 PROCEDURE — 25000128 H RX IP 250 OP 636: Performed by: SURGERY

## 2017-12-19 PROCEDURE — 25000128 H RX IP 250 OP 636: Performed by: INTERNAL MEDICINE

## 2017-12-19 PROCEDURE — 88305 TISSUE EXAM BY PATHOLOGIST: CPT | Performed by: SURGERY

## 2017-12-19 PROCEDURE — 41000018 ZZH PER-PERFUSION 1ST 30 MIN: Performed by: SURGERY

## 2017-12-19 PROCEDURE — 87102 FUNGUS ISOLATION CULTURE: CPT | Performed by: SURGERY

## 2017-12-19 PROCEDURE — 87070 CULTURE OTHR SPECIMN AEROBIC: CPT | Performed by: SURGERY

## 2017-12-19 PROCEDURE — 80048 BASIC METABOLIC PNL TOTAL CA: CPT | Performed by: INTERNAL MEDICINE

## 2017-12-19 PROCEDURE — P9041 ALBUMIN (HUMAN),5%, 50ML: HCPCS | Performed by: THORACIC SURGERY (CARDIOTHORACIC VASCULAR SURGERY)

## 2017-12-19 PROCEDURE — 25000132 ZZH RX MED GY IP 250 OP 250 PS 637: Performed by: HOSPITALIST

## 2017-12-19 PROCEDURE — 25000565 ZZH ISOFLURANE, EA 15 MIN: Performed by: SURGERY

## 2017-12-19 PROCEDURE — 87205 SMEAR GRAM STAIN: CPT | Performed by: SURGERY

## 2017-12-19 PROCEDURE — 85730 THROMBOPLASTIN TIME PARTIAL: CPT | Performed by: INTERNAL MEDICINE

## 2017-12-19 PROCEDURE — P9012 CRYOPRECIPITATE EACH UNIT: HCPCS | Performed by: INTERNAL MEDICINE

## 2017-12-19 PROCEDURE — 25000555 ZZHC RX FACTOR IP 250 OP 636: Performed by: ANESTHESIOLOGY

## 2017-12-19 PROCEDURE — 25000132 ZZH RX MED GY IP 250 OP 250 PS 637: Performed by: THORACIC SURGERY (CARDIOTHORACIC VASCULAR SURGERY)

## 2017-12-19 PROCEDURE — 25000128 H RX IP 250 OP 636: Performed by: NURSE ANESTHETIST, CERTIFIED REGISTERED

## 2017-12-19 PROCEDURE — 4A1239Z MONITORING OF CARDIAC OUTPUT, PERCUTANEOUS APPROACH: ICD-10-PCS | Performed by: SURGERY

## 2017-12-19 PROCEDURE — P9059 PLASMA, FRZ BETWEEN 8-24HOUR: HCPCS | Performed by: INTERNAL MEDICINE

## 2017-12-19 PROCEDURE — 25000128 H RX IP 250 OP 636: Performed by: STUDENT IN AN ORGANIZED HEALTH CARE EDUCATION/TRAINING PROGRAM

## 2017-12-19 PROCEDURE — 02RF08Z REPLACEMENT OF AORTIC VALVE WITH ZOOPLASTIC TISSUE, OPEN APPROACH: ICD-10-PCS | Performed by: SURGERY

## 2017-12-19 PROCEDURE — 82947 ASSAY GLUCOSE BLOOD QUANT: CPT | Performed by: INTERNAL MEDICINE

## 2017-12-19 PROCEDURE — 37000008 ZZH ANESTHESIA TECHNICAL FEE, 1ST 30 MIN: Performed by: SURGERY

## 2017-12-19 PROCEDURE — 25000125 ZZHC RX 250: Performed by: NURSE ANESTHETIST, CERTIFIED REGISTERED

## 2017-12-19 PROCEDURE — 85610 PROTHROMBIN TIME: CPT | Performed by: INTERNAL MEDICINE

## 2017-12-19 PROCEDURE — 5A1221Z PERFORMANCE OF CARDIAC OUTPUT, CONTINUOUS: ICD-10-PCS | Performed by: SURGERY

## 2017-12-19 PROCEDURE — 37000009 ZZH ANESTHESIA TECHNICAL FEE, EACH ADDTL 15 MIN: Performed by: SURGERY

## 2017-12-19 PROCEDURE — 02HP32Z INSERTION OF MONITORING DEVICE INTO PULMONARY TRUNK, PERCUTANEOUS APPROACH: ICD-10-PCS | Performed by: SURGERY

## 2017-12-19 PROCEDURE — 25000125 ZZHC RX 250: Performed by: THORACIC SURGERY (CARDIOTHORACIC VASCULAR SURGERY)

## 2017-12-19 PROCEDURE — 82330 ASSAY OF CALCIUM: CPT | Performed by: THORACIC SURGERY (CARDIOTHORACIC VASCULAR SURGERY)

## 2017-12-19 PROCEDURE — 82803 BLOOD GASES ANY COMBINATION: CPT | Performed by: INTERNAL MEDICINE

## 2017-12-19 PROCEDURE — 27210995 ZZH RX 272: Performed by: SURGERY

## 2017-12-19 PROCEDURE — 40000014 ZZH STATISTIC ARTERIAL MONITORING DAILY

## 2017-12-19 PROCEDURE — 85730 THROMBOPLASTIN TIME PARTIAL: CPT | Performed by: THORACIC SURGERY (CARDIOTHORACIC VASCULAR SURGERY)

## 2017-12-19 PROCEDURE — P9016 RBC LEUKOCYTES REDUCED: HCPCS | Performed by: INTERNAL MEDICINE

## 2017-12-19 PROCEDURE — 30233V1 TRANSFUSION OF NONAUTOLOGOUS ANTIHEMOPHILIC FACTORS INTO PERIPHERAL VEIN, PERCUTANEOUS APPROACH: ICD-10-PCS | Performed by: SURGERY

## 2017-12-19 PROCEDURE — 85018 HEMOGLOBIN: CPT | Performed by: INTERNAL MEDICINE

## 2017-12-19 PROCEDURE — 20000004 ZZH R&B ICU UMMC

## 2017-12-19 PROCEDURE — 85396 CLOTTING ASSAY WHOLE BLOOD: CPT | Performed by: INTERNAL MEDICINE

## 2017-12-19 PROCEDURE — 27810169 ZZH OR IMPLANT GENERAL: Performed by: SURGERY

## 2017-12-19 PROCEDURE — 83605 ASSAY OF LACTIC ACID: CPT | Performed by: THORACIC SURGERY (CARDIOTHORACIC VASCULAR SURGERY)

## 2017-12-19 PROCEDURE — 83036 HEMOGLOBIN GLYCOSYLATED A1C: CPT | Performed by: THORACIC SURGERY (CARDIOTHORACIC VASCULAR SURGERY)

## 2017-12-19 PROCEDURE — P9037 PLATE PHERES LEUKOREDU IRRAD: HCPCS | Performed by: INTERNAL MEDICINE

## 2017-12-19 PROCEDURE — 85049 AUTOMATED PLATELET COUNT: CPT | Performed by: INTERNAL MEDICINE

## 2017-12-19 PROCEDURE — 84132 ASSAY OF SERUM POTASSIUM: CPT | Performed by: INTERNAL MEDICINE

## 2017-12-19 PROCEDURE — 84132 ASSAY OF SERUM POTASSIUM: CPT | Performed by: ANESTHESIOLOGY

## 2017-12-19 PROCEDURE — 82330 ASSAY OF CALCIUM: CPT | Performed by: INTERNAL MEDICINE

## 2017-12-19 PROCEDURE — 40000344 ZZHCL STATISTIC THAWING COMPONENT: Performed by: INTERNAL MEDICINE

## 2017-12-19 PROCEDURE — 94640 AIRWAY INHALATION TREATMENT: CPT

## 2017-12-19 PROCEDURE — 85027 COMPLETE CBC AUTOMATED: CPT | Performed by: THORACIC SURGERY (CARDIOTHORACIC VASCULAR SURGERY)

## 2017-12-19 PROCEDURE — 84100 ASSAY OF PHOSPHORUS: CPT | Performed by: THORACIC SURGERY (CARDIOTHORACIC VASCULAR SURGERY)

## 2017-12-19 PROCEDURE — 40000275 ZZH STATISTIC RCP TIME EA 10 MIN

## 2017-12-19 PROCEDURE — 85610 PROTHROMBIN TIME: CPT | Performed by: THORACIC SURGERY (CARDIOTHORACIC VASCULAR SURGERY)

## 2017-12-19 PROCEDURE — 82330 ASSAY OF CALCIUM: CPT | Performed by: ANESTHESIOLOGY

## 2017-12-19 PROCEDURE — 40000196 ZZH STATISTIC RAPCV CVP MONITORING

## 2017-12-19 PROCEDURE — 99291 CRITICAL CARE FIRST HOUR: CPT | Mod: GC | Performed by: ANESTHESIOLOGY

## 2017-12-19 PROCEDURE — 25000125 ZZHC RX 250

## 2017-12-19 PROCEDURE — 00000146 ZZHCL STATISTIC GLUCOSE BY METER IP

## 2017-12-19 PROCEDURE — 83605 ASSAY OF LACTIC ACID: CPT | Performed by: ANESTHESIOLOGY

## 2017-12-19 PROCEDURE — 71010 XR CHEST PORT 1 VW: CPT

## 2017-12-19 PROCEDURE — 36000076 ZZH SURGERY LEVEL 6 EA 15 ADDTL MIN - UMMC: Performed by: SURGERY

## 2017-12-19 PROCEDURE — 83735 ASSAY OF MAGNESIUM: CPT | Performed by: THORACIC SURGERY (CARDIOTHORACIC VASCULAR SURGERY)

## 2017-12-19 PROCEDURE — 27210460 ZZH PUMP APP ADULT PERFUSION: Performed by: SURGERY

## 2017-12-19 PROCEDURE — 25800025 ZZH RX 258: Performed by: THORACIC SURGERY (CARDIOTHORACIC VASCULAR SURGERY)

## 2017-12-19 PROCEDURE — 84295 ASSAY OF SERUM SODIUM: CPT | Performed by: INTERNAL MEDICINE

## 2017-12-19 DEVICE — IMPLANTABLE DEVICE: Type: IMPLANTABLE DEVICE | Site: HEART | Status: FUNCTIONAL

## 2017-12-19 RX ORDER — PROPOFOL 10 MG/ML
10-20 INJECTION, EMULSION INTRAVENOUS EVERY 30 MIN PRN
Status: DISCONTINUED | OUTPATIENT
Start: 2017-12-19 | End: 2017-12-21

## 2017-12-19 RX ORDER — POTASSIUM CHLORIDE 29.8 MG/ML
20 INJECTION INTRAVENOUS
Status: DISCONTINUED | OUTPATIENT
Start: 2017-12-19 | End: 2018-01-04 | Stop reason: HOSPADM

## 2017-12-19 RX ORDER — MEPERIDINE HYDROCHLORIDE 25 MG/ML
12.5-25 INJECTION INTRAMUSCULAR; INTRAVENOUS; SUBCUTANEOUS
Status: DISCONTINUED | OUTPATIENT
Start: 2017-12-19 | End: 2017-12-19 | Stop reason: CLARIF

## 2017-12-19 RX ORDER — MEPERIDINE HYDROCHLORIDE 25 MG/ML
12.5-25 INJECTION INTRAMUSCULAR; INTRAVENOUS; SUBCUTANEOUS
Status: DISCONTINUED | OUTPATIENT
Start: 2017-12-19 | End: 2017-12-28

## 2017-12-19 RX ORDER — AMOXICILLIN 250 MG
1-2 CAPSULE ORAL 2 TIMES DAILY
Status: DISCONTINUED | OUTPATIENT
Start: 2017-12-19 | End: 2018-01-04 | Stop reason: HOSPADM

## 2017-12-19 RX ORDER — HYDRALAZINE HYDROCHLORIDE 20 MG/ML
10 INJECTION INTRAMUSCULAR; INTRAVENOUS EVERY 30 MIN PRN
Status: DISCONTINUED | OUTPATIENT
Start: 2017-12-19 | End: 2018-01-04 | Stop reason: HOSPADM

## 2017-12-19 RX ORDER — PROPOFOL 10 MG/ML
INJECTION, EMULSION INTRAVENOUS PRN
Status: DISCONTINUED | OUTPATIENT
Start: 2017-12-19 | End: 2017-12-19

## 2017-12-19 RX ORDER — NALOXONE HYDROCHLORIDE 0.4 MG/ML
.1-.4 INJECTION, SOLUTION INTRAMUSCULAR; INTRAVENOUS; SUBCUTANEOUS
Status: DISCONTINUED | OUTPATIENT
Start: 2017-12-19 | End: 2018-01-04 | Stop reason: HOSPADM

## 2017-12-19 RX ORDER — NICOTINE POLACRILEX 4 MG
15-30 LOZENGE BUCCAL
Status: DISCONTINUED | OUTPATIENT
Start: 2017-12-19 | End: 2018-01-04 | Stop reason: HOSPADM

## 2017-12-19 RX ORDER — ACETAMINOPHEN 325 MG/1
975 TABLET ORAL EVERY 8 HOURS
Status: DISCONTINUED | OUTPATIENT
Start: 2017-12-19 | End: 2017-12-19

## 2017-12-19 RX ORDER — PROTAMINE SULFATE 10 MG/ML
INJECTION, SOLUTION INTRAVENOUS PRN
Status: DISCONTINUED | OUTPATIENT
Start: 2017-12-19 | End: 2017-12-19

## 2017-12-19 RX ORDER — ACETAMINOPHEN 325 MG/1
975 TABLET ORAL EVERY 8 HOURS
Status: COMPLETED | OUTPATIENT
Start: 2017-12-19 | End: 2017-12-22

## 2017-12-19 RX ORDER — POTASSIUM CL/LIDO/0.9 % NACL 10MEQ/0.1L
10 INTRAVENOUS SOLUTION, PIGGYBACK (ML) INTRAVENOUS
Status: DISCONTINUED | OUTPATIENT
Start: 2017-12-19 | End: 2018-01-04 | Stop reason: HOSPADM

## 2017-12-19 RX ORDER — FENTANYL CITRATE 50 UG/ML
INJECTION, SOLUTION INTRAMUSCULAR; INTRAVENOUS PRN
Status: DISCONTINUED | OUTPATIENT
Start: 2017-12-19 | End: 2017-12-19

## 2017-12-19 RX ORDER — ACETAMINOPHEN 325 MG/1
650 TABLET ORAL EVERY 4 HOURS PRN
Status: DISCONTINUED | OUTPATIENT
Start: 2017-12-22 | End: 2017-12-19

## 2017-12-19 RX ORDER — MUPIROCIN 20 MG/G
1 OINTMENT TOPICAL 2 TIMES DAILY
Status: DISCONTINUED | OUTPATIENT
Start: 2017-12-19 | End: 2017-12-19 | Stop reason: HOSPADM

## 2017-12-19 RX ORDER — POTASSIUM CHLORIDE 7.45 MG/ML
10 INJECTION INTRAVENOUS
Status: DISCONTINUED | OUTPATIENT
Start: 2017-12-19 | End: 2018-01-04 | Stop reason: HOSPADM

## 2017-12-19 RX ORDER — SODIUM CHLORIDE 9 MG/ML
INJECTION, SOLUTION INTRAVENOUS CONTINUOUS PRN
Status: DISCONTINUED | OUTPATIENT
Start: 2017-12-19 | End: 2017-12-19

## 2017-12-19 RX ORDER — NALOXONE HYDROCHLORIDE 0.4 MG/ML
.1-.4 INJECTION, SOLUTION INTRAMUSCULAR; INTRAVENOUS; SUBCUTANEOUS
Status: DISCONTINUED | OUTPATIENT
Start: 2017-12-19 | End: 2017-12-29

## 2017-12-19 RX ORDER — PROPOFOL 10 MG/ML
INJECTION, EMULSION INTRAVENOUS
Status: COMPLETED
Start: 2017-12-19 | End: 2017-12-19

## 2017-12-19 RX ORDER — POTASSIUM CHLORIDE 7.45 MG/ML
INJECTION INTRAVENOUS PRN
Status: DISCONTINUED | OUTPATIENT
Start: 2017-12-19 | End: 2017-12-19

## 2017-12-19 RX ORDER — MUPIROCIN 20 MG/G
0.5 OINTMENT TOPICAL 2 TIMES DAILY
Status: DISCONTINUED | OUTPATIENT
Start: 2017-12-19 | End: 2017-12-23

## 2017-12-19 RX ORDER — ACETYLCYSTEINE 200 MG/ML
1 SOLUTION ORAL; RESPIRATORY (INHALATION)
Status: DISCONTINUED | OUTPATIENT
Start: 2017-12-19 | End: 2017-12-19 | Stop reason: CLARIF

## 2017-12-19 RX ORDER — DEXTROSE MONOHYDRATE 25 G/50ML
25-50 INJECTION, SOLUTION INTRAVENOUS
Status: DISCONTINUED | OUTPATIENT
Start: 2017-12-19 | End: 2018-01-04 | Stop reason: HOSPADM

## 2017-12-19 RX ORDER — MAGNESIUM SULFATE HEPTAHYDRATE 40 MG/ML
4 INJECTION, SOLUTION INTRAVENOUS EVERY 4 HOURS PRN
Status: DISCONTINUED | OUTPATIENT
Start: 2017-12-19 | End: 2018-01-04 | Stop reason: HOSPADM

## 2017-12-19 RX ORDER — OXYCODONE HYDROCHLORIDE 5 MG/1
5-10 TABLET ORAL
Status: DISCONTINUED | OUTPATIENT
Start: 2017-12-19 | End: 2017-12-21

## 2017-12-19 RX ORDER — ACETYLCYSTEINE 100 MG/ML
2 SOLUTION ORAL; RESPIRATORY (INHALATION) EVERY 4 HOURS
Status: DISCONTINUED | OUTPATIENT
Start: 2017-12-19 | End: 2017-12-27

## 2017-12-19 RX ORDER — ACETAMINOPHEN 325 MG/1
650 TABLET ORAL EVERY 4 HOURS PRN
Status: DISCONTINUED | OUTPATIENT
Start: 2017-12-22 | End: 2018-01-04 | Stop reason: HOSPADM

## 2017-12-19 RX ORDER — ALBUMIN, HUMAN INJ 5% 5 %
SOLUTION INTRAVENOUS
Status: COMPLETED
Start: 2017-12-19 | End: 2017-12-19

## 2017-12-19 RX ORDER — NITROGLYCERIN 10 MG/100ML
INJECTION INTRAVENOUS PRN
Status: DISCONTINUED | OUTPATIENT
Start: 2017-12-19 | End: 2017-12-19

## 2017-12-19 RX ORDER — ACETAMINOPHEN 325 MG/1
650 TABLET ORAL EVERY 4 HOURS PRN
Status: DISCONTINUED | OUTPATIENT
Start: 2017-12-19 | End: 2017-12-19

## 2017-12-19 RX ORDER — DEXTROSE MONOHYDRATE, SODIUM CHLORIDE, AND POTASSIUM CHLORIDE 50; 1.49; 4.5 G/1000ML; G/1000ML; G/1000ML
INJECTION, SOLUTION INTRAVENOUS CONTINUOUS
Status: DISCONTINUED | OUTPATIENT
Start: 2017-12-19 | End: 2017-12-24

## 2017-12-19 RX ORDER — DEXMEDETOMIDINE HYDROCHLORIDE 4 UG/ML
0.2-1.2 INJECTION, SOLUTION INTRAVENOUS CONTINUOUS
Status: DISCONTINUED | OUTPATIENT
Start: 2017-12-19 | End: 2017-12-19 | Stop reason: HOSPADM

## 2017-12-19 RX ORDER — ALBUMIN, HUMAN INJ 5% 5 %
SOLUTION INTRAVENOUS
Status: DISCONTINUED
Start: 2017-12-19 | End: 2017-12-22 | Stop reason: HOSPADM

## 2017-12-19 RX ORDER — POTASSIUM CHLORIDE 1.5 G/1.58G
20-40 POWDER, FOR SOLUTION ORAL
Status: DISCONTINUED | OUTPATIENT
Start: 2017-12-19 | End: 2018-01-04 | Stop reason: HOSPADM

## 2017-12-19 RX ORDER — OXYCODONE HYDROCHLORIDE 5 MG/1
5-10 TABLET ORAL
Status: DISCONTINUED | OUTPATIENT
Start: 2017-12-19 | End: 2017-12-19

## 2017-12-19 RX ORDER — LIDOCAINE HYDROCHLORIDE 20 MG/ML
INJECTION, SOLUTION INFILTRATION; PERINEURAL PRN
Status: DISCONTINUED | OUTPATIENT
Start: 2017-12-19 | End: 2017-12-19

## 2017-12-19 RX ORDER — PROPOFOL 10 MG/ML
INJECTION, EMULSION INTRAVENOUS CONTINUOUS PRN
Status: DISCONTINUED | OUTPATIENT
Start: 2017-12-19 | End: 2017-12-19

## 2017-12-19 RX ORDER — TEMAZEPAM 7.5 MG/1
15 CAPSULE ORAL
Status: DISCONTINUED | OUTPATIENT
Start: 2017-12-20 | End: 2018-01-04 | Stop reason: HOSPADM

## 2017-12-19 RX ORDER — ALBUMIN, HUMAN INJ 5% 5 %
500-1000 SOLUTION INTRAVENOUS
Status: COMPLETED | OUTPATIENT
Start: 2017-12-19 | End: 2017-12-19

## 2017-12-19 RX ORDER — ACETYLCYSTEINE 200 MG/ML
1 SOLUTION ORAL; RESPIRATORY (INHALATION) EVERY 6 HOURS
Status: DISCONTINUED | OUTPATIENT
Start: 2017-12-19 | End: 2017-12-19

## 2017-12-19 RX ORDER — HEPARIN SODIUM 1000 [USP'U]/ML
INJECTION, SOLUTION INTRAVENOUS; SUBCUTANEOUS PRN
Status: DISCONTINUED | OUTPATIENT
Start: 2017-12-19 | End: 2017-12-19

## 2017-12-19 RX ORDER — ALBUTEROL SULFATE 0.83 MG/ML
2.5 SOLUTION RESPIRATORY (INHALATION) EVERY 4 HOURS
Status: DISCONTINUED | OUTPATIENT
Start: 2017-12-19 | End: 2017-12-21

## 2017-12-19 RX ORDER — POTASSIUM CHLORIDE 1500 MG/1
20-40 TABLET, EXTENDED RELEASE ORAL
Status: DISCONTINUED | OUTPATIENT
Start: 2017-12-19 | End: 2018-01-04 | Stop reason: HOSPADM

## 2017-12-19 RX ORDER — ONDANSETRON 4 MG/1
4 TABLET, ORALLY DISINTEGRATING ORAL EVERY 6 HOURS PRN
Status: DISCONTINUED | OUTPATIENT
Start: 2017-12-19 | End: 2018-01-04 | Stop reason: HOSPADM

## 2017-12-19 RX ORDER — PROPOFOL 10 MG/ML
5-75 INJECTION, EMULSION INTRAVENOUS CONTINUOUS
Status: DISCONTINUED | OUTPATIENT
Start: 2017-12-19 | End: 2017-12-21

## 2017-12-19 RX ORDER — DEXMEDETOMIDINE HYDROCHLORIDE 4 UG/ML
0.2-0.7 INJECTION, SOLUTION INTRAVENOUS CONTINUOUS
Status: DISCONTINUED | OUTPATIENT
Start: 2017-12-19 | End: 2017-12-24

## 2017-12-19 RX ORDER — LIDOCAINE 40 MG/G
CREAM TOPICAL
Status: DISCONTINUED | OUTPATIENT
Start: 2017-12-19 | End: 2018-01-04 | Stop reason: HOSPADM

## 2017-12-19 RX ORDER — ONDANSETRON 2 MG/ML
4 INJECTION INTRAMUSCULAR; INTRAVENOUS EVERY 6 HOURS PRN
Status: DISCONTINUED | OUTPATIENT
Start: 2017-12-19 | End: 2018-01-04 | Stop reason: HOSPADM

## 2017-12-19 RX ORDER — DEXAMETHASONE SODIUM PHOSPHATE 4 MG/ML
INJECTION, SOLUTION INTRA-ARTICULAR; INTRALESIONAL; INTRAMUSCULAR; INTRAVENOUS; SOFT TISSUE PRN
Status: DISCONTINUED | OUTPATIENT
Start: 2017-12-19 | End: 2017-12-19

## 2017-12-19 RX ORDER — CALCIUM CHLORIDE 100 MG/ML
INJECTION INTRAVENOUS; INTRAVENTRICULAR PRN
Status: DISCONTINUED | OUTPATIENT
Start: 2017-12-19 | End: 2017-12-19

## 2017-12-19 RX ADMIN — NAFCILLIN: 10 INJECTION, POWDER, FOR SOLUTION INTRAVENOUS at 20:00

## 2017-12-19 RX ADMIN — FENTANYL CITRATE 250 MCG: 50 INJECTION, SOLUTION INTRAMUSCULAR; INTRAVENOUS at 15:30

## 2017-12-19 RX ADMIN — SENNOSIDES AND DOCUSATE SODIUM 1 TABLET: 8.6; 5 TABLET ORAL at 20:02

## 2017-12-19 RX ADMIN — PHENYLEPHRINE HYDROCHLORIDE 100 MCG: 10 INJECTION, SOLUTION INTRAMUSCULAR; INTRAVENOUS; SUBCUTANEOUS at 08:47

## 2017-12-19 RX ADMIN — MUPIROCIN 1 G: 20 OINTMENT TOPICAL at 05:55

## 2017-12-19 RX ADMIN — POTASSIUM CHLORIDE 20 MEQ: 29.8 INJECTION, SOLUTION INTRAVENOUS at 21:10

## 2017-12-19 RX ADMIN — NOREPINEPHRINE BITARTRATE 6.4 MCG: 1 INJECTION INTRAVENOUS at 15:05

## 2017-12-19 RX ADMIN — FENTANYL CITRATE 50 MCG: 50 INJECTION, SOLUTION INTRAMUSCULAR; INTRAVENOUS at 17:30

## 2017-12-19 RX ADMIN — PHENYLEPHRINE HYDROCHLORIDE 300 MCG: 10 INJECTION, SOLUTION INTRAMUSCULAR; INTRAVENOUS; SUBCUTANEOUS at 08:53

## 2017-12-19 RX ADMIN — MIDAZOLAM 1 MG: 1 INJECTION INTRAMUSCULAR; INTRAVENOUS at 08:33

## 2017-12-19 RX ADMIN — PHENYLEPHRINE HYDROCHLORIDE 100 MCG: 10 INJECTION, SOLUTION INTRAMUSCULAR; INTRAVENOUS; SUBCUTANEOUS at 08:49

## 2017-12-19 RX ADMIN — NAFCILLIN: 10 INJECTION, POWDER, FOR SOLUTION INTRAVENOUS at 23:00

## 2017-12-19 RX ADMIN — KETOTIFEN FUMARATE 1 DROP: 0.25 SOLUTION/ DROPS OPHTHALMIC at 21:17

## 2017-12-19 RX ADMIN — PHENYLEPHRINE HYDROCHLORIDE 100 MCG: 10 INJECTION, SOLUTION INTRAMUSCULAR; INTRAVENOUS; SUBCUTANEOUS at 08:38

## 2017-12-19 RX ADMIN — SODIUM CHLORIDE, POTASSIUM CHLORIDE, SODIUM LACTATE AND CALCIUM CHLORIDE 500 ML: 600; 310; 30; 20 INJECTION, SOLUTION INTRAVENOUS at 22:22

## 2017-12-19 RX ADMIN — NAFCILLIN: 10 INJECTION, POWDER, FOR SOLUTION INTRAVENOUS at 22:00

## 2017-12-19 RX ADMIN — MIRTAZAPINE 30 MG: 15 TABLET, FILM COATED ORAL at 22:58

## 2017-12-19 RX ADMIN — NOREPINEPHRINE BITARTRATE 0.03 MCG/KG/MIN: 1 INJECTION INTRAVENOUS at 08:56

## 2017-12-19 RX ADMIN — FENTANYL CITRATE 200 MCG: 50 INJECTION, SOLUTION INTRAMUSCULAR; INTRAVENOUS at 08:33

## 2017-12-19 RX ADMIN — FENTANYL CITRATE 50 MCG: 50 INJECTION, SOLUTION INTRAMUSCULAR; INTRAVENOUS at 17:25

## 2017-12-19 RX ADMIN — PROTAMINE SULFATE 180 MG: 10 INJECTION, SOLUTION INTRAVENOUS at 15:03

## 2017-12-19 RX ADMIN — SODIUM CHLORIDE: 9 INJECTION, SOLUTION INTRAVENOUS at 09:00

## 2017-12-19 RX ADMIN — NITROGLYCERIN 100 MCG: 10 INJECTION INTRAVENOUS at 16:19

## 2017-12-19 RX ADMIN — FENTANYL CITRATE 50 MCG: 50 INJECTION, SOLUTION INTRAMUSCULAR; INTRAVENOUS at 10:01

## 2017-12-19 RX ADMIN — ALBUMIN HUMAN 250 ML: 0.05 INJECTION, SOLUTION INTRAVENOUS at 18:49

## 2017-12-19 RX ADMIN — NOREPINEPHRINE BITARTRATE 6.4 MCG: 1 INJECTION INTRAVENOUS at 17:13

## 2017-12-19 RX ADMIN — EPINEPHRINE 0.05 MCG/KG/MIN: 1 INJECTION PARENTERAL at 14:36

## 2017-12-19 RX ADMIN — NOREPINEPHRINE BITARTRATE 6.4 MCG: 1 INJECTION INTRAVENOUS at 10:13

## 2017-12-19 RX ADMIN — MUPIROCIN 0.5 G: 20 OINTMENT TOPICAL at 21:17

## 2017-12-19 RX ADMIN — ALBUMIN HUMAN 500 ML: 0.05 INJECTION, SOLUTION INTRAVENOUS at 19:43

## 2017-12-19 RX ADMIN — NOREPINEPHRINE BITARTRATE 6.4 MCG: 1 INJECTION INTRAVENOUS at 17:15

## 2017-12-19 RX ADMIN — VANCOMYCIN HYDROCHLORIDE 1 G: 1 INJECTION, SOLUTION INTRAVENOUS at 09:00

## 2017-12-19 RX ADMIN — FENTANYL CITRATE 100 MCG: 50 INJECTION, SOLUTION INTRAMUSCULAR; INTRAVENOUS at 16:21

## 2017-12-19 RX ADMIN — Medication 10 MEQ: at 07:24

## 2017-12-19 RX ADMIN — FENTANYL CITRATE 50 MCG: 50 INJECTION, SOLUTION INTRAMUSCULAR; INTRAVENOUS at 16:55

## 2017-12-19 RX ADMIN — FAMOTIDINE 20 MG: 10 INJECTION, SOLUTION INTRAVENOUS at 20:11

## 2017-12-19 RX ADMIN — FENTANYL CITRATE 100 MCG: 50 INJECTION, SOLUTION INTRAMUSCULAR; INTRAVENOUS at 09:56

## 2017-12-19 RX ADMIN — POTASSIUM CHLORIDE, DEXTROSE MONOHYDRATE AND SODIUM CHLORIDE: 150; 5; 450 INJECTION, SOLUTION INTRAVENOUS at 18:37

## 2017-12-19 RX ADMIN — Medication 300 MG: at 22:17

## 2017-12-19 RX ADMIN — POTASSIUM CHLORIDE 10 MEQ: 7.46 INJECTION, SOLUTION INTRAVENOUS at 15:25

## 2017-12-19 RX ADMIN — PHENYLEPHRINE HYDROCHLORIDE 200 MCG: 10 INJECTION, SOLUTION INTRAMUSCULAR; INTRAVENOUS; SUBCUTANEOUS at 08:51

## 2017-12-19 RX ADMIN — HUMAN INSULIN 1 UNITS/HR: 100 INJECTION, SOLUTION SUBCUTANEOUS at 18:40

## 2017-12-19 RX ADMIN — FENTANYL CITRATE 100 MCG: 50 INJECTION, SOLUTION INTRAMUSCULAR; INTRAVENOUS at 16:59

## 2017-12-19 RX ADMIN — PROPOFOL 40 MCG/KG/MIN: 10 INJECTION, EMULSION INTRAVENOUS at 18:40

## 2017-12-19 RX ADMIN — ROCURONIUM BROMIDE 80 MG: 10 INJECTION INTRAVENOUS at 08:33

## 2017-12-19 RX ADMIN — FENTANYL CITRATE 100 MCG: 50 INJECTION, SOLUTION INTRAMUSCULAR; INTRAVENOUS at 09:53

## 2017-12-19 RX ADMIN — HEPARIN SODIUM 32000 UNITS: 1000 INJECTION, SOLUTION INTRAVENOUS; SUBCUTANEOUS at 09:57

## 2017-12-19 RX ADMIN — LIDOCAINE HYDROCHLORIDE 100 MG: 20 INJECTION, SOLUTION INFILTRATION; PERINEURAL at 08:33

## 2017-12-19 RX ADMIN — DEXMEDETOMIDINE HYDROCHLORIDE 0.3 MCG/KG/HR: 4 INJECTION, SOLUTION INTRAVENOUS at 13:58

## 2017-12-19 RX ADMIN — DEXAMETHASONE SODIUM PHOSPHATE 10 MG: 4 INJECTION, SOLUTION INTRA-ARTICULAR; INTRALESIONAL; INTRAMUSCULAR; INTRAVENOUS; SOFT TISSUE at 09:46

## 2017-12-19 RX ADMIN — MINERAL OIL AND WHITE PETROLATUM: 150; 830 OINTMENT OPHTHALMIC at 23:09

## 2017-12-19 RX ADMIN — Medication 10 MEQ: at 18:36

## 2017-12-19 RX ADMIN — NOREPINEPHRINE BITARTRATE 6.4 MCG: 1 INJECTION INTRAVENOUS at 15:09

## 2017-12-19 RX ADMIN — PHENYLEPHRINE HYDROCHLORIDE 100 MCG: 10 INJECTION, SOLUTION INTRAMUSCULAR; INTRAVENOUS; SUBCUTANEOUS at 08:45

## 2017-12-19 RX ADMIN — FENTANYL CITRATE 100 MCG: 50 INJECTION, SOLUTION INTRAMUSCULAR; INTRAVENOUS at 15:57

## 2017-12-19 RX ADMIN — ROCURONIUM BROMIDE 50 MG: 10 INJECTION INTRAVENOUS at 12:24

## 2017-12-19 RX ADMIN — ACETYLCYSTEINE 2 ML: 100 SOLUTION ORAL; RESPIRATORY (INHALATION) at 21:07

## 2017-12-19 RX ADMIN — FENTANYL CITRATE 50 MCG/HR: 50 INJECTION, SOLUTION INTRAMUSCULAR; INTRAVENOUS at 20:41

## 2017-12-19 RX ADMIN — CALCIUM CHLORIDE 1000 MG: 100 INJECTION, SOLUTION INTRAVENOUS at 15:24

## 2017-12-19 RX ADMIN — FENTANYL CITRATE 50 MCG: 50 INJECTION, SOLUTION INTRAMUSCULAR; INTRAVENOUS at 17:28

## 2017-12-19 RX ADMIN — VENLAFAXINE 50 MG: 50 TABLET ORAL at 21:17

## 2017-12-19 RX ADMIN — AMINOCAPROIC ACID: 250 INJECTION, SOLUTION INTRAVENOUS at 16:00

## 2017-12-19 RX ADMIN — MIDAZOLAM 3 MG: 1 INJECTION INTRAMUSCULAR; INTRAVENOUS at 14:00

## 2017-12-19 RX ADMIN — POTASSIUM CHLORIDE 20 MEQ: 29.8 INJECTION, SOLUTION INTRAVENOUS at 20:01

## 2017-12-19 RX ADMIN — PANTOPRAZOLE SODIUM 40 MG: 40 INJECTION, POWDER, FOR SOLUTION INTRAVENOUS at 20:02

## 2017-12-19 RX ADMIN — SODIUM CHLORIDE: 9 INJECTION, SOLUTION INTRAVENOUS at 08:16

## 2017-12-19 RX ADMIN — METOPROLOL TARTRATE 12.5 MG: 25 TABLET, FILM COATED ORAL at 05:54

## 2017-12-19 RX ADMIN — ALBUMIN HUMAN 12.5 G: 0.05 INJECTION, SOLUTION INTRAVENOUS at 20:41

## 2017-12-19 RX ADMIN — PROPOFOL 45 MCG/KG/MIN: 10 INJECTION, EMULSION INTRAVENOUS at 17:11

## 2017-12-19 RX ADMIN — ACETAMINOPHEN 975 MG: 325 TABLET, FILM COATED ORAL at 20:02

## 2017-12-19 RX ADMIN — NITROGLYCERIN 100 MCG: 10 INJECTION INTRAVENOUS at 16:29

## 2017-12-19 RX ADMIN — FENTANYL CITRATE 150 MCG: 50 INJECTION, SOLUTION INTRAMUSCULAR; INTRAVENOUS at 12:24

## 2017-12-19 RX ADMIN — FENTANYL CITRATE 100 MCG: 50 INJECTION, SOLUTION INTRAMUSCULAR; INTRAVENOUS at 14:15

## 2017-12-19 RX ADMIN — NOREPINEPHRINE BITARTRATE 6.4 MCG: 1 INJECTION INTRAVENOUS at 17:43

## 2017-12-19 RX ADMIN — PROPOFOL 50 MG: 10 INJECTION, EMULSION INTRAVENOUS at 08:33

## 2017-12-19 RX ADMIN — EPINEPHRINE 0.04 MCG/KG/MIN: 1 INJECTION PARENTERAL at 18:39

## 2017-12-19 RX ADMIN — LORAZEPAM 0.5 MG: 2 INJECTION INTRAMUSCULAR; INTRAVENOUS at 03:32

## 2017-12-19 RX ADMIN — NAFCILLIN: 10 INJECTION, POWDER, FOR SOLUTION INTRAVENOUS at 21:00

## 2017-12-19 RX ADMIN — ROCURONIUM BROMIDE 20 MG: 10 INJECTION INTRAVENOUS at 09:39

## 2017-12-19 RX ADMIN — NOREPINEPHRINE BITARTRATE 6.4 MCG: 1 INJECTION INTRAVENOUS at 09:35

## 2017-12-19 RX ADMIN — NOREPINEPHRINE BITARTRATE 6.4 MCG: 1 INJECTION INTRAVENOUS at 09:34

## 2017-12-19 RX ADMIN — MIDAZOLAM 1 MG: 1 INJECTION INTRAMUSCULAR; INTRAVENOUS at 08:18

## 2017-12-19 RX ADMIN — FENTANYL CITRATE 250 MCG: 50 INJECTION, SOLUTION INTRAMUSCULAR; INTRAVENOUS at 15:17

## 2017-12-19 RX ADMIN — ROCURONIUM BROMIDE 50 MG: 10 INJECTION INTRAVENOUS at 09:30

## 2017-12-19 RX ADMIN — COAGULATION FACTOR VIIA (RECOMBINANT) 4 MG: KIT at 15:56

## 2017-12-19 RX ADMIN — AMINOCAPROIC ACID 5 G/HR: 250 INJECTION, SOLUTION INTRAVENOUS at 09:05

## 2017-12-19 RX ADMIN — NOREPINEPHRINE BITARTRATE 6.4 MCG: 1 INJECTION INTRAVENOUS at 15:06

## 2017-12-19 RX ADMIN — HUMAN INSULIN 1 UNITS/HR: 100 INJECTION, SOLUTION SUBCUTANEOUS at 10:43

## 2017-12-19 RX ADMIN — NITROGLYCERIN 50 MCG: 10 INJECTION INTRAVENOUS at 15:55

## 2017-12-19 RX ADMIN — POTASSIUM CHLORIDE 10 MEQ: 7.46 INJECTION, SOLUTION INTRAVENOUS at 16:40

## 2017-12-19 RX ADMIN — FENTANYL CITRATE 50 MCG: 50 INJECTION, SOLUTION INTRAMUSCULAR; INTRAVENOUS at 09:20

## 2017-12-19 RX ADMIN — PHENYLEPHRINE HYDROCHLORIDE 100 MCG: 10 INJECTION, SOLUTION INTRAMUSCULAR; INTRAVENOUS; SUBCUTANEOUS at 08:42

## 2017-12-19 RX ADMIN — PROTAMINE SULFATE 20 MG: 10 INJECTION, SOLUTION INTRAVENOUS at 15:02

## 2017-12-19 RX ADMIN — FENTANYL CITRATE 250 MCG: 50 INJECTION, SOLUTION INTRAMUSCULAR; INTRAVENOUS at 14:35

## 2017-12-19 RX ADMIN — AMINOCAPROIC ACID 1 G/HR: 250 INJECTION, SOLUTION INTRAVENOUS at 10:05

## 2017-12-19 RX ADMIN — ALBUTEROL SULFATE 2.5 MG: 2.5 SOLUTION RESPIRATORY (INHALATION) at 21:04

## 2017-12-19 ASSESSMENT — COPD QUESTIONNAIRES
COPD: 1
CAT_SEVERITY: MILD

## 2017-12-19 ASSESSMENT — LIFESTYLE VARIABLES: TOBACCO_USE: 1

## 2017-12-19 NOTE — BRIEF OP NOTE
[facility], [place]    Brief Operative Note    Pre-operative diagnosis: AORTIC ROOT SUDO ANEURYSM  Post-operative diagnosis * No post-op diagnosis entered *  Procedure: Procedure(s):  REDO Median STERNOTOMY, FEMORAL CANNULATION, Lysis of adhesions, AORTIC ROOT VALVE HOMOGRAFT with 26mm x 7.0cm Cryolife Aortic valve and conduit - Wound Class: I-Clean  Surgeon: Surgeon(s) and Role:     * Pili Bowers MD - Primary     * Onur Chávez MD - Assisting     * Charles Nogueira MD - Assisting  Anesthesia: General   Estimated blood loss: 500 ml  Drains: Mediastinal x2, R/L Pleural x2.  Specimens:   ID Type Source Tests Collected by Time Destination   1 : Mediastinal fluid  Fluid Other ANAEROBIC BACTERIAL CULTURE, FLUID CULTURE AEROBIC BACTERIAL, FUNGUS CULTURE, GRAM STAIN Pili Bowers MD 12/19/2017 11:23 AM    2 : Aortic Root and pseudoanuerysm Fluid Other ANAEROBIC BACTERIAL CULTURE, FLUID CULTURE AEROBIC BACTERIAL, FUNGUS CULTURE, GRAM STAIN Pili Bowers MD 12/19/2017 11:39 AM    3 : Pseudoaneurysm Tissue Other ANAEROBIC BACTERIAL CULTURE, FUNGUS CULTURE, GRAM STAIN, TISSUE CULTURE AEROBIC BACTERIAL Pili Bowers MD 12/19/2017 11:50 AM    A : [place] Aortic Valve and Bukged Graft Other (specify in comments) Other SURGICAL PATHOLOGY EXAM Pili Bowers MD 12/19/2017  2:49 PM      Findings:   None.  Complications: None.  Implants: None.

## 2017-12-19 NOTE — PLAN OF CARE
Neuro baseline deficits remain, disoriented to time. K+ of 3.3, replacement started, anesthesia aware. Nipride drip at 0.3 mcg/kg/min, to keep systolic less than 120. Anesthesia here at 0800, pt went to OR.

## 2017-12-19 NOTE — ANESTHESIA PREPROCEDURE EVALUATION
Anesthesia Evaluation     . Pt has had prior anesthetic. Type: General    No history of anesthetic complications          ROS/MED HX    ENT/Pulmonary: Comment: S/p trach for PNA, decannulated 3 weeks ago    (+)AYDIN risk factors hypertension, tobacco use (none since hospitalization), Current use mild COPD, , . .   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 No clear communication noted between the aorta  and the echo free space around the aorta with contrast study or color Doppler.        (+) Dyslipidemia, hypertension----. : . CHF Last EF: 30-35% date: 10/7 . [facility], . :. valvular problems/murmurs (s/p bioprosthetic aortic valve) type: AI . Previous cardiac testing Echodate:12/17/17results:The patient is post bio-Bentall - aortic root replacement with a 30 mm graft and AVR with a Trifecta valve. Today, there is circumferential free space noted surrounding the aortic graft, dehiscence and rocking of the graft from  the native aorta and severe AI in the space surrounding the aortic graft (perivalvular/perigraft). The circumferential free space was present on the previous studies of October 2017 but was filled with echodense material and  the graft was not independently mobile, and there was no significant AI. The prosthetic aortic valve leaflets are not well visualized today.date: results:ECG reviewed date:10/7/2017 results:    EKG: Sinus rhythm at 85, no ST/T changes, no ectopy. QTc normal.    Cath date: 5/2016 results:Angiographic Results:  Right coronary artery is a small nondominant vessel.  Left main coronary artery has mild ostial tapering in the 10-20%  range.   Left anterior descending artery  is a large wraparound vessel. There is  narrowing in the mid vessel that appears to be in the 40-50% range.  There is a large ramus intermedius branch with minimal luminal  irregularities.  Circumflex coronary artery has a 30% narrowing prior to the first  obtuse marginal the first obtuse marginal has a 90-95% stenosis. There  are several branch vessels in the posterior aspect of the heart that  make up the posterior descending artery, they have mild to moderate  disease.  Ventriculography demonstrates ejection fraction of 55-60%.    Aortic root injection demonstrates a dilated aorta with severe aortic  insufficiency.    Conclusion:   1. Severe aortic insufficiency.  2. 90-95% stenosis in the first obtuse marginal.  3. 40-50% mid LAD stenosis.  4. 30% proximal circumflex stenosis.           (-) taking anticoagulants/antiplatelets   METS/Exercise Tolerance:     Hematologic:     (+) Anemia, -      Musculoskeletal: Comment:     positive left knee aspiration cultures for staph aureus      (+) , , other musculoskeletal-       GI/Hepatic:        (-) GERD   Renal/Genitourinary: Comment:     Right pyelonephritis         (+) chronic renal disease, type: CRI,       Endo: Comment:     Insulin gtt        (+) Obesity, .      Psychiatric:  - neg psychiatric ROS       Infectious Disease: Comment:   Sepsis - Blood cultures with S.Aureus    -On Levaquin, nafcillin and gentamicin       (+) SBE Prophylaxis (infective endocarditis. On antibiotics), Other Infectious Disease       Malignancy:      - no malignancy   Other:                 OWEN (10/7/2017):  Interpretation Summary  There is a bioprosthetic valve in the aortic position. The leaflets appear to open well. There is trace central AI, no evidence for paravalvular leak. No evidence for valve dehiscence. A long and thin (about 2cm in length) freely mobile fibrinous strand is attached to the one of the valve strouts on the aortic side of the valve. In addition, there appears to be a  small, 0.3cm mass on the ventricular side of the leaflets, only visualized in a limited number of tomograms. There is large, circumferential, contained echodense material surrounding the aortic root. It originates at the level of the valve and extends along the aorta graft. There is no fluid or evidence of blood flow into this area, may represent organized thrombus in the right clinical setting. There is no evidence for dissection in the visualized portions of the aorta. No evidence for aortic stenosis    Left ventricular function is normal.The EF is 60-65%. Left ventricular size is normal. Right ventricular function, chamber size, wall motion, and thickness are normal.  The left atrial appendage is normal. It is free of spontaneous echo contrast and thrombus.  Prolapse of the posterior mitral valve leaflet is noted. Trace mitral insufficiency is present.  No pericardial effusion is present.     CT C/A/P:  IMPRESSION:  1. Fluid collection with enhancing margin around the aortic root and  ascending thoracic aorta possibly postsurgical hematoma, dissection or  infected fluid collection. A discrete dissection flap is not  definitely identified.  2. Probable right pyelonephritis with decreased cortical enhancement  posterior mid right kidney. Kidneys are otherwise unremarkable.  Remaining abdominal and pelvic organs are within normal limits. No  diverticulitis or appendicitis. A few scattered liver cysts are  present of doubtful clinical significance.  3. No evidence of pneumonia or effusion. Changes of old granulomatous  disease are noted.  4. No evidence of intra-abdominal or pelvic abscess.    Physical Exam      Airway   Mallampati: I  TM distance: >3 FB  Neck ROM: full    Dental   (+) lower dentures and upper dentures    Cardiovascular   Rhythm and rate: regular and normal  (+) murmur       Pulmonary (+) decreased breath sounds                           Anesthesia Plan      History & Physical Review      ASA Status:   4 emergent.    NPO Status:  > 8 hours (feeding tube)    Plan for General and ETT (ETT in situ) with Intravenous and Propofol induction. Maintenance will be Balanced.    PONV prophylaxis:  Ondansetron (or other 5HT-3)  Additional equipment: Arterial Line, Central Line, 2nd IV, CVP, PA Catheter and DWIGHT         Postoperative Care  Postoperative pain management:  IV analgesics and Multi-modal analgesia.      Consents  Anesthetic plan, risks, benefits and alternatives discussed with:  Patient.  Use of blood products discussed: Yes.   Use of blood products discussed with Patient.  Consented to blood products.  .          63 yo male s/p aortic valve replacement with bioprosthetic valve and aortic aneurysm repair in 2016, multiple infections complications in 2017 including blood borne sepsis,septic knee joint, pyelonephritis, and stroke with MRI findings of cardioembolic stroke to different regions (MCA, occipital, cerebellar), endocarditis and periaortic fluid collection and valve dehiscence here for redo sternotomy and AVR / ascending replacement with homograft.    PMH: smoker, CHF (EF 30-35%), HTN, HLD  ASA 4.  Plan for GETA with art, cvp, pac, dwight, postop ICU admission with mechanical ventilation, possible need for blood transfusion.  Plan discussed with patient and family; all questions answered.  Risks of anesthesia discussed, including sore throat, PONV and risk of dental or lip trauma.    Daniela Arnett MD  Staff Anesthesiologist  Pager [phone]

## 2017-12-19 NOTE — PLAN OF CARE
Problem: Patient Care Overview  Goal: Plan of Care/Patient Progress Review  OT4A: CX: Pt to go to OR today, will check back tomorrow as able.

## 2017-12-19 NOTE — PLAN OF CARE
Problem: Patient Care Overview  Goal: Plan of Care/Patient Progress Review  Outcome: No Change  D/I: Patient admitted to unit 4A Surgical/Neuro ICU s/p cardiac abscess/graft leak  /89  Temp 98  F (36.7  C) (Axillary)  Resp 16  Wt 78 kg (171 lb 15.3 oz)  SpO2 96%  BMI 26.15 kg/m2  Neuro- baseline neuro exam.  Pt only A & O x 2-3.  Ativan given x 2 for anxiety   CV- SR./ST SBP <130 overnight.  Nipride gtt continues at 0.3  Respiratory- 2 liters NC, O2 sats >94%  GI- NPO at midnight  - voiding/incontintent x2  Gtts- Nipride and Nafcillin  Pain- Denies  ID- Pre-op Abx ordered and sent to OR with patient  See flowsheets for further interventions and assessments. All pre-op orders completed.  Surgery prep done.    A: Stable  P: Continue to monitor pt closely. Notify MD of significant changes. To OR this AM for redo sternotomy, groin bypass possible circulatory arrest, aortic root/aortic valve homograft.

## 2017-12-19 NOTE — PLAN OF CARE
Problem: Patient Care Overview  Goal: Plan of Care/Patient Progress Review  SLP: Dysphagia therapy cancelled.  Pt in OR at the time of session attempt.  ST to follow.

## 2017-12-19 NOTE — ANESTHESIA PROCEDURE NOTES
Perioperative OWEN Report  Anesthesia Information  OWEN probe placed and report generated by: : KAMERON HILLMAN MEGAN JOY  Images for this study have been archived.     Echocardiogram Comments: PreCPB:  1.  Mildly enlarged LV (5.2 cm KATHERINE) with moderately depressed LVEF, 35-40% by visual estimate.   2.  Normal RV size and function.  3.  Large ascending aortic aneurysmal sac (~9.6 x 5.8 cm) with prior AVR and ascending Ao graft visualized within sac.  The posterior edge of the prior AVR has dehisced and there is flow around the graft.  The existing bioprosthetic valve appears to be competent.    4.  Mild MR, mild TR, trace VT with PAC in place.    PostCPB:  1.  LV remains mildly enlarged with slightly improved function on 0.05 mcg/kg/min epinephrine. EF ~40%.  2.  RV function unchanged.  3.  AVR homograft in place, no AI.  4.  Other valves unchanged: mild MR, mild TR, trace VT.        Preanesthesia Checklist:  Patient identified, IV assessed, risks and benefits discussed, monitors and equipment assessed, procedure being performed at surgeon's request and anesthesia consent obtained.  General Procedure Information  Modalities:  2D, 3D, CW Doppler, PW Doppler and Color flow mapping  Diagnostic indications for OWEN:         Aortic insufficiency                    .    Echocardiographic and Doppler Measurements  Right Ventricle:  Cavity size normal.   Global function normal.     Left Ventricle:  Cavity size dilated.   Diastolic dimension 5.2 cm.   Global Function moderately impaired.   Ejection Fraction 35%.      Ventricular Regional Function:  1- Basal Anteroseptal:  hypokinetic  2- Basal Anterior:  hypokinetic  3- Basal Anterolateral:  hypokinetic  4- Basal Inferolateral:  hypokinetic  5- Basal Inferior:  hypokinetic  6- Basal Inferoseptal:  hypokinetic  7- Mid Anteroseptal:  hypokinetic  8- Mid Anterior:  hypokinetic  9- Mid Anterolateral:  hypokinetic  10- Mid Inferolateral:  hypokinetic  11- Mid  Inferior:  hypokinetic  12- Mid Inferoseptal:  hypokinetic  13- Apical Anterior:  hypokinetic  14- Apical Lateral:  hypokinetic  15- Apical Inferior:  hypokinetic  16- Apical Septal:  hypokinetic  17- Holbrook:  hypokinetic    Valves  Aortic Valve: Annulus bioprosthetic.  Regurgitation +4.    Mitral Valve: Annulus normal.  Regurgitation +1.    Tricuspid Valve: Annulus normal.  Regurgitation +1.    Pulmonic Valve: Annulus normal.  Regurgitation absent.      Aorta: Ascending Aorta: Size aneurysmal.    Aortic Arch: Size normal.     Descending Aorta: Size normal.         Right Atrium:  Size normal.    Left Atrium: Size normal.  Left atrial appendage normal.     Atrial Septum: Intra-atrial septal morphology normal.     Ventricular Septum: Intra-ventricular septum morphology normal.       Other Findings:   Pericardium:  normal.  Pleural Effusion:  none. Pulmonary Arteries:  normal.  .  Cornoary sinus catheter present.  .  .  Post Intervention Findings  Procedure(s) performed:  Ascending Aorta Repair and Aortic Valve Repair/Replace. Global function:  Improved.   Regional wall motion:  Unchanged   Surgeon(s) notified of all postintervention findings:  Yes  .  .  .   .  .  .  .  .  .  .        Echocardiogram Comments  PreCPB:  1.  Mildly enlarged LV (5.2 cm KATHERINE) with moderately depressed LVEF, 35-40% by visual estimate.   2.  Normal RV size and function.  3.  Large ascending aortic aneurysmal sac (~9.6 x 5.8 cm) with prior AVR and ascending Ao graft visualized within sac.  The posterior edge of the prior AVR has dehisced and there is flow around the graft.  The existing bioprosthetic valve appears to be competent.    4.  Mild MR, mild TR, trace ND with PAC in place.    PostCPB:  1.  LV remains mildly enlarged with slightly improved function on 0.05 mcg/kg/min epinephrine. EF ~40%.  2.  RV function unchanged.  3.  AVR homograft in place, no AI.  4.  Other valves unchanged: mild MR, mild TR, trace ND.

## 2017-12-19 NOTE — ANESTHESIA PROCEDURE NOTES
OWEN Probe Insertion Note  Probe Inserted by: KAMERON HILLMAN   Insertion method: OWEN probe easily inserted   Bite block used:   No   Reason bite block not used: no dentition present   Probe type:  Adult 3D   Insertion complications: No complications.      Billing for OWEN report is being done by Anesthesiology

## 2017-12-19 NOTE — ANESTHESIA PROCEDURE NOTES
PA Catheter Insertion Note  Anesthesiologist: KAMERON HILLMAN      Introducer: Introducer placed as part of procedure (SEE separate note)   Skin prep: Cap, Full body drape, hand hygiene, Mask, Sterile gloves and Sterile gown    PA Catheter type:  CCO    Distance catheter advanced:  50 cm.    Appropriate RA, RV, PA  waveforms?: Yes    Dressing:  Biopatch and Tegaderm    Complications:  None apparent

## 2017-12-19 NOTE — ANESTHESIA PROCEDURE NOTES
Arterial Line Procedure Note  Staff:     Anesthesiologist:  KAMERON HILLMAN  Location: In OR Before Induction  Procedure Start/Stop Times:     patient identified, IV checked, risks and benefits discussed, informed consent, monitors and equipment checked, pre-op evaluation and at physician/surgeon's request      Correct Patient: Yes      Correct Position: Yes      Correct Site: Yes      Correct Procedure: Yes      Correct Laterality:  N/A    Site Marked:  N/A  Line Placement:     Procedure:  Arterial Line    Insertion Site:  Radial    Insertion laterality:  Left    Skin Prep: Chloraprep      Patient Prep: patient draped      Local skin infiltration:  1% lidocaine    amount (mL):  1    Ultrasound Guided?: No      Catheter size:  20 gauge, 12 cm    Cath secured with: suture      Dressing:  Tegaderm    Complications:  None obvious    Arterial waveform: Yes      IBP within 10% of NIBP: Yes

## 2017-12-19 NOTE — PLAN OF CARE
Pt arrived from  at 1145.   Neuro: disoriented to time and situation intermittently. Appropriate, follows all commands. Calm/cooperative. R pupil larger than L pupils. Continues to have right sided weakness.   Cardiac: nipride drip at 0.3mcg/kg/min, to keep systolic BP less than 120 per order. Afebrile. Sinus tachycardic.   Respiratory: LS clear, remains on 2l NC.   GI: PEG in place, swallow study completed per speech. Pt advanced to regular diet, adequate appetite. Stool X1.   : good UOP.   Plan: OR in the morning.

## 2017-12-19 NOTE — H&P
CVICU ADMISSION NOTE  12/19/2017      ASSESSMENT: Cricket Miguel is a 62 year old male with hx of COPD, HTN, HLD, CAD, HFpEF, aortic stenosis and ascending aortic aneurism s/p aortic valve replacement and aortic root replacement (April 2016) c/b moises-aortic abscess, endocarditis, cerebral septic emboli and severe AI s/p redo-sternotomy aortic root and valve homograft 12/19.       PLAN:   Neuro/ pain/ sedation: Hx of multifocal acute infarctions involving the left parietotemporal lobes, left cerebral hemisphere and right occipital lobe presumed to be septic emboli. Depression, mild delirium, followed by psych.  -Monitor neurological status. Notify the MD for any acute changes in exam.  -fentanyl ggt for pain.  -precedex/propofol for sedation.  When able, restart psychia-tric medications per psych recommendations:   1.  He may discontinue the Abilify.   2.  Drop the Effexor to 50 mg twice a day for 3 days and then discontinue.   3.  Increase Remeron to 30 mg at bedtime.   4.  It is okay to continue the Ativan 0.5- 1.0 mg q.6 h. IV p.r.n. anxiety.   5.  Seroquel 25 mg q.i.d. p.r.n. anxiety and agitation.   6.  Haldol 1-2 mg IV q.4h. for severe agitation.   7.  Reconsult Psychiatry as needed.      Pulmonary care: Intubated and mechanically ventilated.   -Wean to extubate in AM     Cardiovascular:  HTN, HLD, HFpEF, CAD, AS and aortic aneurism s/p bental c/b endocarditis and severe AI s/p aortic root/valve homograft 12/19.  -Epinephrine to maintain MAP>65  -Keep SBP<120  -holding pta ASA 81mg, HCTZ, labetalol, and atorvastatin     GI care: OGT  -OGT to LIS  -pantoprazole     Fluids/ Electrolytes/ Nutrition:   -PRN electrolyte replacement  -No indication for parenteral nutrition.     Renal/ Fluid Balance: Urine output is adequate so far.  -Espinoza catheter for strict intake and output.     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 Hx of MSSA septic  arthritis and bacteremia. Hx of multifocal acute infarctions involving the left parietotemporal lobes, left cerebral hemisphere and right occipital lobe presumed to be septic emboli. ID following.   -per ID, continue nafcillin, rifampin  -Repeat blood cultures if febrile  -F/u surgical tissue cultures   Heme: acute blood loss anemia  -Given 5 U FFP, 4 U PRBCs, 2 U Plt, 2 U cryo, 550 cell saver intraop  -Trend CBC     Prophylaxis:    -Mechanical prophylaxis for DVT.   -No chemical DVT prophylaxis due to high risk of bleeding.      Lines/ tubes/ drains:  -R IJI CVC, L radial arterial line, R triple lumen PICC, garza, OGT, PIVs     Disposition:  -CVICU.     Patient seen, findings and plan discussed with CVICU staff Susan Rodriguez MD  PGY-3, General Surgery    - - - - - - - - - - - - - - - - - - - - - - - - - - - - - - - - - - - - - - - - - - - - - - - - - - - - - - - - - - - - - - - - - - - - - - - -     PRIMARY TEAM: CVTS  PRIMARY PHYSICIAN: Jero    REASON FOR CRITICAL CARE ADMISSION: s/p aortic root valve homograft   ADMITTING PHYSICIAN: Jero    HISTORY PRESENTING ILLNESS: Cricket Miguel is a 62 year old male with hx of COPD, HTN, HLD, CAD, HFpEF, aortic stenosis and ascending aortic aneurism s/p aortic valve replacement and aortic root replacement (April 2016) c/b moises-aortic abscess, endocarditis, cerebral septic emboli and severe AI s/p redo-sternotomy aortic root and valve homograft 12/19.     Echodate:12/17/17results:The patient is post bio-Bentall - aortic root replacement with a 30 mm graft and AVR with a Trifecta valve. Today, there is circumferential free space noted surrounding the aortic graft, dehiscence and rocking of the graft from  the native aorta and severe AI in the space surrounding the aortic graft (perivalvular/perigraft). The circumferential free space was present on the previous studies of October 2017 but was filled with echodense material and  the graft was not independently mobile,  and there was no significant AI. The prosthetic aortic valve leaflets are not well visualized    ECG reviewed date:10/7/2017 results: EKG: Sinus rhythm at 85, no ST/T changes, no ectopy. QTc normal.     Cath date: 5/2016 results:Angiographic Results:  Right coronary artery is a small nondominant vessel.  Left main coronary artery has mild ostial tapering in the 10-20%  range.   Left anterior descending artery is a large wraparound vessel. There is  narrowing in the mid vessel that appears to be in the 40-50% range.  There is a large ramus intermedius branch with minimal luminal  irregularities.  Circumflex coronary artery has a 30% narrowing prior to the first  obtuse marginal the first obtuse marginal has a 90-95% stenosis. There  are several branch vessels in the posterior aspect of the heart that  make up the posterior descending artery, they have mild to moderate  disease.  Ventriculography demonstrates ejection fraction of 55-60%.    REVIEW OF SYSTEMS: Unable to obtain secondary to patient intubated and sedated.     PAST MEDICAL HISTORY:   Past Medical History:   Diagnosis Date     Abscess of aortic root 09/2017     AI (aortic insufficiency)     severe     Anxiety      Aortic root dilatation (H)      AR (aortic regurgitation)     severe     Cardiomyopathy (H)      CHF (congestive heart failure), NYHA class II (H)      COPD, mild (H)     spirometry 12/16     CVA (cerebral vascular accident) (H) 09/2017    septic emboli - MSSA - cerebellum, Left MCA, Rt Occipital, Aphasia, Left visual field cut, moderate to severe encephalopathy     Depression 09/2017     Heart murmur      Hyperlipidemia LDL goal <70 10/31/2010     Hypertension goal BP (blood pressure) < 140/90      Inguinal hernia      left lung nodule  1/29/2008     Major depressive disorder, single episode, moderate (H)      Psoriasis childhood     Tobacco use disorder        SURGICAL HISTORY:   Past Surgical History:   Procedure Laterality Date      ARTHROSCOPY KNEE INCISION AND DRAINAGE Left 10/8/2017    Procedure: ARTHROSCOPY KNEE INCISION AND DRAINAGE;  Arthroscopic Incision and Drainage of Left Knee;  Surgeon: Lucretia Munoz MD;  Location: [facility] OR     HC SHOULDER ARTHROSCOPY, DX  2001    Arthroscopy, Shoulder RT Dr OSIIRS Andersen     HC SHOULDER ARTHROSCOPY, DX  1/04    LT arthroscopy Dr OSIRIS Andersen     HERNIA REPAIR, UMBILICAL  1/08    incarcerated - dr rockwell     HERNIORRHAPHY INGUINAL  12/21/2012    HERNIORRHAPHY INGUINAL;  Right Inguinal Hernia Repair with mesh ;  Surgeon: Mello Rockwell MD;  Location: [facility] OR     REPLACE VALVE AORTIC N/A 5/17/2016    REPLACE VALVE AORTIC - Dr Bowers     ROTATOR CUFF REPAIR RT/LT  11/10    Rt arthroscopy - Dr OSIRIS Andersen     SURGICAL HISTORY OF -   5/16    AVR, severe AR, aortic root repair     TRACHEOSTOMY PERCUTANEOUS  10/27/2017            SOCIAL HISTORY:   Social History     Social History     Marital status:      Spouse name: N/A     Number of children: 3     Years of education: 12     Social History Main Topics     Smoking status: Former Smoker     Packs/day: 1.00     Years: 35.00     Quit date: 4/1/2016     Smokeless tobacco: Never Used      Comment: 4/2016     Alcohol use 0.0 oz/week     0 Standard drinks or equivalent per week      Comment: 1 drink per week avg, seldom     Drug use: Yes      Comment: marijuana- daily     Sexual activity: Yes     Partners: Female     Other Topics Concern     Caffeine Concern Yes     3 cups     Sleep Concern No     Special Diet No     trying to watch salt     Exercise Yes     walking     Seat Belt No     Parent/Sibling W/ Cabg, Mi Or Angioplasty Before 65f 55m? No     Social History Narrative       FAMILY HISTORY:   Family History   Problem Relation Age of Onset     Genetic Disorder Mother      Bone plateover growth Agustin. shoulder surgeries     CANCER Mother      brain cancer     Alzheimer Disease Father        ALLERGIES:      Allergies   Allergen Reactions     Lisinopril  Swelling     One-sided facial swelling after being on lisinopril/HCTZ for one week.        MEDICATIONS:    No current facility-administered medications on file prior to encounter.   Current Outpatient Prescriptions on File Prior to Encounter:  ARIPiprazole (ABILIFY) 2 MG tablet Take 3 tablets (6 mg) by mouth daily   hydrochlorothiazide (HYDRODIURIL) 25 MG tablet Take 0.5 tablets (12.5 mg) by mouth daily   labetalol (NORMODYNE) 100 MG tablet Take 1 tablet (100 mg) by mouth every 8 hours   D5W 1,000 mL with nafcillin 12 g continuous infusion Inject 12 g into the vein every 24 hours   famotidine (PEPCID) 40 MG/5ML suspension Take 2.5 mLs (20 mg) by mouth 2 times daily   acetaminophen (TYLENOL) 32 mg/mL solution 20.3 mLs (650 mg) by Oral or Feeding Tube route every 6 hours as needed for fever or mild pain   aspirin 81 MG chewable tablet 1 tablet (81 mg) by Oral or Feeding Tube route daily   fiber modular (NUTRISOURCE FIBER) packet 1 packet by Per Feeding Tube route 3 times daily   mirtazapine (REMERON) 7.5 MG TABS tablet GIVE 3 TABLETS(22.5MG) VIA G-TUBE AT BEDTIME   loperamide (IMODIUM A-D) 2 MG tablet 2 tab by g tube 3 times daily PRN for diarrhea   potassium chloride (KLOR-CON) 20 MEQ Packet    QUEtiapine (SEROQUEL) 25 MG tablet 1 tablet (25 mg) by Oral or Feeding Tube route 4 times daily as needed   venlafaxine (EFFEXOR) 100 MG tablet Take 1 tablet (100 mg) by mouth 2 times daily   ondansetron (ZOFRAN) 4 MG tablet Take 1-2 tablets (4-8mg) every 8 hours as needed for nausea   amLODIPine (NORVASC) 5 MG tablet Take 1 tablet (5 mg) by mouth daily   lactobacillus TABS Take 1 tablet by mouth 3 times daily   Menthol-Zinc Oxide 0.44-20.6 % OINT 1 application topically TID PRN for perianal irritation   Miconazole Nitrate 2 % POWD Apply in folds   moxifloxacin (VIGAMOX) 0.5 % ophthalmic solution Place 1 drop Into the left eye 3 times daily   ketotifen (ZADITOR/REFRESH ANTI-ITCH) 0.025 % SOLN ophthalmic solution Place 1 drop  Into the left eye 3 times daily   White Petrolatum-Mineral Oil (SYSTANE NIGHTTIME) OINT    atorvastatin (LIPITOR) 40 MG tablet TAKE 1 TABLET(40 MG) BY MOUTH DAILY   rifampin (REIFADEN) 25 mg/mL SUSP 12 mLs (300 mg) by Oral or Feeding Tube route 2 times daily   polyethylene glycol (MIRALAX/GLYCOLAX) Packet Take 17 g by mouth daily as needed for constipation   multivitamins with minerals (CERTAVITE/CEROVITE) LIQD liquid 15 mLs by Per Feeding Tube route daily       PHYSICAL EXAMINATION:  Temp:  [36.4  C (97.6  F)-36.8  C (98.2  F)] 36.8  C (98.2  F)  Heart Rate:  [] 90  Resp:  [12-18] 16  BP: (105-153)/() 118/89  SpO2:  [93 %-99 %] 97 %  General: intubated and sedated   HEENT: Normocephalic, atraumatic.    Chest wall: Symmetric thorax. Median sternotomy dressing c/d/i.   Respiratory: Equal BS bilateraly. Intubated and mechanically ventilated.  Cardiovascular: RRR, chest tubes draining serosanguinous fluid. Cool extremities  Gastrointestinal: Abdomen soft, non-distended.  Genitourinary: Espinoza catheter in place.   Skin: As noted above. No rashes or lesions appreciated.   Lines: R IJ CVC, L radial arterial line, OGT. R PICC    LABS: Reviewed.   Arterial Blood Gases     Recent Labs  Lab 12/19/17  1617 12/19/17  1513 12/19/17  1427 12/19/17  1402   PH 7.44 7.35 7.31* 7.37   PCO2 41 48* 51* 47*   PO2 62* 340* 354* 327*   HCO3 28 27 26 27     Complete Blood Count     Recent Labs  Lab 12/19/17  1617 12/19/17  1515 12/19/17  1513 12/19/17  1427 12/19/17  1415 12/19/17  1402  12/19/17  0606 12/18/17  0359 12/17/17 2024 12/17/17  0019   WBC  --   --   --   --   --   --   --  11.2* 9.5 10.3 11.2*   HGB 8.2*  --  7.7* 8.1*  --  6.9*  < > 10.7* 10.5* 10.5* 10.6*   PLT  --  217  --   --  294  --   --  438 423 428 420   < > = values in this interval not displayed.  Basic Metabolic Panel    Recent Labs  Lab 12/19/17  1617 12/19/17  1513 12/19/17  1427 12/19/17  1402  12/19/17  0606 12/18/17  0359 12/17/17 2024  12/17/17  0730    143 140 137  < > 138 138 140 137   POTASSIUM 3.1* 3.5 4.1 3.7  < > 3.3* 3.8 3.4 3.6   CHLORIDE  --   --   --   --   --  100 102 102 102   CO2  --   --   --   --   --  29 29 30 27   BUN  --   --   --   --   --  9 9 10 10   CR  --   --   --   --   --  1.38* 1.26* 1.22 1.13   * 140* 166* 161*  < > 112* 109* 105* 111*   < > = values in this interval not displayed.  Liver Function Tests    Recent Labs  Lab 12/19/17  1515 12/19/17  0606 12/18/17  0359 12/17/17 2024 12/17/17  0019   AST  --   --  32 29 33   ALT  --   --  30 32 34   ALKPHOS  --   --  92 94 102   BILITOTAL  --   --  1.3 1.0 1.3   ALBUMIN  --   --  1.5* 1.6* 1.6*   INR 1.56* 1.27* 1.12 1.10 1.06     Pancreatic Enzymes  No lab results found in last 7 days.  Coagulation Profile    Recent Labs  Lab 12/19/17  1515 12/19/17  0606 12/18/17  0359 12/17/17 2024   INR 1.56* 1.27* 1.12 1.10   PTT 37  --  31 34     Lactate  Invalid input(s): LACTATE    IMAGING:  Results for orders placed or performed during the hospital encounter of 12/16/17   CT Chest/Abd/Pelvis Angio w Processing     Value    Radiologist flags (AA)     Cranial migration of the root graft/prosthetic valve    Narrative    EXAMINATION: CTA ANGIOGRAM CHEST/ABD/PELVIS W PROCESSING, 12/17/2017  11:31 AM    TECHNIQUE: Helical CT images from the thoracic inlet through the  symphysis pubis were obtained with contrast. Contrast dose: 100cc of  Isovue 370    COMPARISON: Chest radiograph 12/16/2017, PET CT 10/19/2017, CT  10/6/2017    HISTORY: S/p Bentall procedure w/ known aortic abscess    FINDINGS:    Chest:   Fluid collection vs aneurysm surrounding the aortic root, measuring  approximately 8.9 x 10.3 cm compared to 9.5 x 9.3 cm previously, This  fluid collection/aneurysm now diffusely inhomogeneously fills with  intravenous contrast, new since the previous exam. The fluid  collection or aneurysm of the aortic root remnant surrounding the  aortic root graft appears in  communication with the subvalvular  portion aortic root graft (series 13, image 53) and may be a perigraft  leak into the native aortic root remnant. Postoperative changes of  aortic valvular and root replacement. Though it is possible that the  root graft has unseated from the left ventricle in the cranial  direction creating the leak into the root remnant. The ascending  aortic wall is poorly visualized in some areas, presumably  discontinuous given contrast filling of the periaortic fluid  collection (series 13, image 34). No dissection flap seen in the  thoracic aorta. No substantial aortic dilation.    The visualized thyroid parenchyma, aortic branching pattern,  pericardium, and esophagus are unremarkable. Scattered prominent  subcentimeter mediastinal lymph nodes with an enlarged 1.3 cm  subcarinal lymph node, similar to priors.    The central tracheal tree is patent. No pneumothorax, pleural  effusion, or focal airspace consolidation. Mild upper lobe predominant  central lobular and paraseptal emphysema. Scattered calcified  granulomas.    Abdomen and pelvis:  The liver, gallbladder, spleen, and pancreas are unremarkable.  Scattered focal areas of scarring in the posterior right kidney. No  hydronephrosis. Mildly diffusely thickened adrenal glands without  focal mass. Enlarged prostate with diffuse bladder wall thickening and  punctate focus of gas within the bladder, presumably related to recent  catheterization. No intra-abdominal free air or free fluid.  Percutaneous gastrostomy tube. No dilated loops of bowel. The appendix  is normal. Moderate to severe atherosclerotic ossifications in the  normal caliber abdominal aorta. The major abdominal vasculature is  patent. No lymphadenopathy in the abdomen or pelvis.    Bones and soft tissues:  Prominent fat in the left inguinal canal. Multilevel degenerative  changes in the spine with prominent intravertebral disc herniations,  particularly in the superior  vertebral endplate of L5. No acute or  worrisome osseous lesions.        Impression    IMPRESSION:   1. The periaortic fluid collection or aneurysm is slightly increased  in size, however there is new diffuse contrast opacification of this  area and suspected perigraft subvalvular leak into the native  ascending aortic remnant. This area also appears to communicate with  the left ventricle, and that the root graft with valve replacement has  migrated cranially and  from the LV.  2. Diffuse bladder wall thickening, which may be related to bladder  outlet obstruction or decompressed state.      [Critical Result: Cranial migration of the root graft/prosthetic valve  with separation from the left ventricle and resulting subvalvular leak    Finding was identified on 12/17/2017 11:41 AM.     Dr. Ma was contacted by Dr. Russo at 12/17/2017 12:08 PM and  verbalized understanding of the critical finding is new opacification  of the periaortic fluid collection/aneurysm and communication with the  left ventricle. Dr. Russo contacted Dr. Ma at 1:15 PM on 12/17/2017  to clarify that the AV prosthesis and root graft has  from  the LV with a resulting subvalvular leak into the native ascending  aortic remnant which is slightly increased in size.    I have personally reviewed the examination and initial interpretation  and I agree with the findings.    SAMANTHA CHRISTOPHER MD   US Lower Extremity Venous Mapping Bilateral    Narrative    Exam: Ultrasound Doppler vein mapping of bilateral lower extremities  dated  12/18/2017 3:59 PM    Comparison study: None available    Clinical history: Preoperative evaluation    Ordering clinician: Dr. Rodriguez    Technique: Grayscale (B-mode) with duplex Doppler ultrasound, along  with compression and augmentation, of the lower extremity veins.    RIGHT LEG:    CFV: Thrombus: No    GSV:  SFJ: Thrombus: No, 3.1 mm  Proximal thigh: Thrombus: No, 3.0 mm  Mid thigh: Thrombus: No, 3.0  mm  Distal thigh: Thrombus: No, 3.2 mm  Knee: Thrombus: No, 3.2 mm  Superior calf: Thrombus: No, 3.0 mm  Mid calf: Thrombus: No, 2.0 mm  Distal calf: Thrombus: No, 3.2 mm    LEFT LEG:    CFV: Thrombus: No    GSV:  SFJ: Thrombus: No, Wall thickness: , 2.4 mm  Proximal thigh: Thrombus: No, Wall thickness: , 3.3 mm  Mid thigh: Not seen  Distal thigh: Not seen  Knee: Thrombus: No, 3.0 mm  Superior calf: Thrombus: No, 2.0 mm  Mid calf: Thrombus: No,  2.7 mm  Distal calf: Thrombus: No, 2.1 mm      Impression    Impression:   Great saphenous vein diameters in both lower extremities as described  above. Approximately 6 to 7 cm of the great saphenous vein in the mid  and distal thigh is absent, compatible with the patient's history of  prior left great saphenous vein harvesting. No great saphenous vein  thrombus or common femoral vein thrombus identified.

## 2017-12-20 ENCOUNTER — APPOINTMENT (OUTPATIENT)
Dept: CARDIOLOGY | Facility: CLINIC | Age: 62
DRG: 219 | End: 2017-12-20
Attending: SURGERY
Payer: COMMERCIAL

## 2017-12-20 ENCOUNTER — APPOINTMENT (OUTPATIENT)
Dept: GENERAL RADIOLOGY | Facility: CLINIC | Age: 62
DRG: 219 | End: 2017-12-20
Attending: THORACIC SURGERY (CARDIOTHORACIC VASCULAR SURGERY)
Payer: COMMERCIAL

## 2017-12-20 LAB
ABO + RH BLD: NORMAL
ABO + RH BLD: NORMAL
ANION GAP SERPL CALCULATED.3IONS-SCNC: 10 MMOL/L (ref 3–14)
ANION GAP SERPL CALCULATED.3IONS-SCNC: 9 MMOL/L (ref 3–14)
APTT PPP: 39 SEC (ref 22–37)
BACTERIA SPEC CULT: NORMAL
BASE DEFICIT BLDA-SCNC: 0.6 MMOL/L
BASE DEFICIT BLDV-SCNC: 2.6 MMOL/L
BASE EXCESS BLDA CALC-SCNC: 0.4 MMOL/L
BASE EXCESS BLDA CALC-SCNC: 0.6 MMOL/L
BASE EXCESS BLDA CALC-SCNC: 0.9 MMOL/L
BASE EXCESS BLDV CALC-SCNC: 0 MMOL/L
BASE EXCESS BLDV CALC-SCNC: 0.1 MMOL/L
BASE EXCESS BLDV CALC-SCNC: 2.2 MMOL/L
BLD GP AB SCN SERPL QL: NORMAL
BLD PROD TYP BPU: NORMAL
BLD UNIT ID BPU: 0
BLD UNIT ID BPU: 0
BLOOD BANK CMNT PATIENT-IMP: NORMAL
BLOOD PRODUCT CODE: NORMAL
BLOOD PRODUCT CODE: NORMAL
BPU ID: NORMAL
BPU ID: NORMAL
BUN SERPL-MCNC: 11 MG/DL (ref 7–30)
BUN SERPL-MCNC: 13 MG/DL (ref 7–30)
CA-I BLD-MCNC: 4.6 MG/DL (ref 4.4–5.2)
CALCIUM SERPL-MCNC: 7.8 MG/DL (ref 8.5–10.1)
CALCIUM SERPL-MCNC: 8.2 MG/DL (ref 8.5–10.1)
CHLORIDE SERPL-SCNC: 109 MMOL/L (ref 94–109)
CHLORIDE SERPL-SCNC: 109 MMOL/L (ref 94–109)
CO2 SERPL-SCNC: 25 MMOL/L (ref 20–32)
CO2 SERPL-SCNC: 26 MMOL/L (ref 20–32)
CREAT SERPL-MCNC: 1.22 MG/DL (ref 0.66–1.25)
CREAT SERPL-MCNC: 1.33 MG/DL (ref 0.66–1.25)
CRP SERPL-MCNC: 87 MG/L (ref 0–8)
ERYTHROCYTE [DISTWIDTH] IN BLOOD BY AUTOMATED COUNT: 17.9 % (ref 10–15)
ERYTHROCYTE [DISTWIDTH] IN BLOOD BY AUTOMATED COUNT: 18 % (ref 10–15)
FIBRINOGEN PPP-MCNC: 326 MG/DL (ref 200–420)
GFR SERPL CREATININE-BSD FRML MDRD: 54 ML/MIN/1.7M2
GFR SERPL CREATININE-BSD FRML MDRD: 60 ML/MIN/1.7M2
GLUCOSE BLDC GLUCOMTR-MCNC: 107 MG/DL (ref 70–99)
GLUCOSE BLDC GLUCOMTR-MCNC: 122 MG/DL (ref 70–99)
GLUCOSE BLDC GLUCOMTR-MCNC: 133 MG/DL (ref 70–99)
GLUCOSE BLDC GLUCOMTR-MCNC: 136 MG/DL (ref 70–99)
GLUCOSE BLDC GLUCOMTR-MCNC: 139 MG/DL (ref 70–99)
GLUCOSE BLDC GLUCOMTR-MCNC: 140 MG/DL (ref 70–99)
GLUCOSE BLDC GLUCOMTR-MCNC: 143 MG/DL (ref 70–99)
GLUCOSE BLDC GLUCOMTR-MCNC: 160 MG/DL (ref 70–99)
GLUCOSE BLDC GLUCOMTR-MCNC: 162 MG/DL (ref 70–99)
GLUCOSE BLDC GLUCOMTR-MCNC: 168 MG/DL (ref 70–99)
GLUCOSE BLDC GLUCOMTR-MCNC: 183 MG/DL (ref 70–99)
GLUCOSE SERPL-MCNC: 160 MG/DL (ref 70–99)
GLUCOSE SERPL-MCNC: 179 MG/DL (ref 70–99)
GRAM STN SPEC: ABNORMAL
GRAM STN SPEC: ABNORMAL
HCO3 BLD-SCNC: 23 MMOL/L (ref 21–28)
HCO3 BLD-SCNC: 25 MMOL/L (ref 21–28)
HCO3 BLDV-SCNC: 23 MMOL/L (ref 21–28)
HCO3 BLDV-SCNC: 25 MMOL/L (ref 21–28)
HCO3 BLDV-SCNC: 26 MMOL/L (ref 21–28)
HCO3 BLDV-SCNC: 28 MMOL/L (ref 21–28)
HCT VFR BLD AUTO: 21.4 % (ref 40–53)
HCT VFR BLD AUTO: 24 % (ref 40–53)
HGB BLD-MCNC: 7 G/DL (ref 13.3–17.7)
HGB BLD-MCNC: 8 G/DL (ref 13.3–17.7)
HGB BLD-MCNC: 8.1 G/DL (ref 13.3–17.7)
HGB BLD-MCNC: 8.2 G/DL (ref 13.3–17.7)
INR PPP: 1.29 (ref 0.86–1.14)
INTERPRETATION ECG - MUSE: NORMAL
LACTATE BLD-SCNC: 2.3 MMOL/L (ref 0.7–2)
Lab: NORMAL
MAGNESIUM SERPL-MCNC: 2.3 MG/DL (ref 1.6–2.3)
MAGNESIUM SERPL-MCNC: 2.4 MG/DL (ref 1.6–2.3)
MAGNESIUM SERPL-MCNC: 2.4 MG/DL (ref 1.6–2.3)
MCH RBC QN AUTO: 30.5 PG (ref 26.5–33)
MCH RBC QN AUTO: 30.7 PG (ref 26.5–33)
MCHC RBC AUTO-ENTMCNC: 32.7 G/DL (ref 31.5–36.5)
MCHC RBC AUTO-ENTMCNC: 33.3 G/DL (ref 31.5–36.5)
MCV RBC AUTO: 92 FL (ref 78–100)
MCV RBC AUTO: 94 FL (ref 78–100)
MRSA DNA SPEC QL NAA+PROBE: NEGATIVE
NUM BPU REQUESTED: 7
O2/TOTAL GAS SETTING VFR VENT: 40 %
O2/TOTAL GAS SETTING VFR VENT: 50 %
O2/TOTAL GAS SETTING VFR VENT: 90 %
O2/TOTAL GAS SETTING VFR VENT: 90 %
O2/TOTAL GAS SETTING VFR VENT: ABNORMAL %
O2/TOTAL GAS SETTING VFR VENT: NORMAL %
OXYHGB MFR BLD: 97 % (ref 92–100)
OXYHGB MFR BLDV: 39 %
OXYHGB MFR BLDV: 43 %
OXYHGB MFR BLDV: 46 %
OXYHGB MFR BLDV: 56 %
PCO2 BLD: 34 MM HG (ref 35–45)
PCO2 BLD: 37 MM HG (ref 35–45)
PCO2 BLD: 39 MM HG (ref 35–45)
PCO2 BLD: 40 MM HG (ref 35–45)
PCO2 BLDV: 40 MM HG (ref 40–50)
PCO2 BLDV: 42 MM HG (ref 40–50)
PCO2 BLDV: 44 MM HG (ref 40–50)
PCO2 BLDV: 47 MM HG (ref 40–50)
PH BLD: 7.41 PH (ref 7.35–7.45)
PH BLD: 7.43 PH (ref 7.35–7.45)
PH BLD: 7.43 PH (ref 7.35–7.45)
PH BLD: 7.44 PH (ref 7.35–7.45)
PH BLDV: 7.37 PH (ref 7.32–7.43)
PH BLDV: 7.37 PH (ref 7.32–7.43)
PH BLDV: 7.38 PH (ref 7.32–7.43)
PH BLDV: 7.38 PH (ref 7.32–7.43)
PHOSPHATE SERPL-MCNC: 2.5 MG/DL (ref 2.5–4.5)
PHOSPHATE SERPL-MCNC: 3.7 MG/DL (ref 2.5–4.5)
PHOSPHATE SERPL-MCNC: 4.4 MG/DL (ref 2.5–4.5)
PLATELET # BLD AUTO: 338 10E9/L (ref 150–450)
PLATELET # BLD AUTO: 358 10E9/L (ref 150–450)
PO2 BLD: 107 MM HG (ref 80–105)
PO2 BLD: 116 MM HG (ref 80–105)
PO2 BLD: 160 MM HG (ref 80–105)
PO2 BLD: 281 MM HG (ref 80–105)
PO2 BLDV: 22 MM HG (ref 25–47)
PO2 BLDV: 25 MM HG (ref 25–47)
PO2 BLDV: 27 MM HG (ref 25–47)
PO2 BLDV: 29 MM HG (ref 25–47)
POTASSIUM SERPL-SCNC: 3.8 MMOL/L (ref 3.4–5.3)
POTASSIUM SERPL-SCNC: 4.2 MMOL/L (ref 3.4–5.3)
POTASSIUM SERPL-SCNC: 4.3 MMOL/L (ref 3.4–5.3)
RBC # BLD AUTO: 2.28 10E12/L (ref 4.4–5.9)
RBC # BLD AUTO: 2.62 10E12/L (ref 4.4–5.9)
SODIUM SERPL-SCNC: 144 MMOL/L (ref 133–144)
SODIUM SERPL-SCNC: 144 MMOL/L (ref 133–144)
SPECIMEN EXP DATE BLD: NORMAL
SPECIMEN SOURCE: ABNORMAL
SPECIMEN SOURCE: NORMAL
TRANSFUSION STATUS PATIENT QL: NORMAL
WBC # BLD AUTO: 15.5 10E9/L (ref 4–11)
WBC # BLD AUTO: 16.3 10E9/L (ref 4–11)

## 2017-12-20 PROCEDURE — 85384 FIBRINOGEN ACTIVITY: CPT | Performed by: INTERNAL MEDICINE

## 2017-12-20 PROCEDURE — 93308 TTE F-UP OR LMTD: CPT | Mod: 26 | Performed by: INTERNAL MEDICINE

## 2017-12-20 PROCEDURE — 25000132 ZZH RX MED GY IP 250 OP 250 PS 637: Performed by: INTERNAL MEDICINE

## 2017-12-20 PROCEDURE — 25000132 ZZH RX MED GY IP 250 OP 250 PS 637: Performed by: HOSPITALIST

## 2017-12-20 PROCEDURE — 94003 VENT MGMT INPAT SUBQ DAY: CPT

## 2017-12-20 PROCEDURE — 93325 DOPPLER ECHO COLOR FLOW MAPG: CPT | Mod: 26 | Performed by: INTERNAL MEDICINE

## 2017-12-20 PROCEDURE — 87081 CULTURE SCREEN ONLY: CPT | Performed by: SURGERY

## 2017-12-20 PROCEDURE — 71010 XR CHEST PORT 1 VW: CPT

## 2017-12-20 PROCEDURE — 99291 CRITICAL CARE FIRST HOUR: CPT | Mod: GC | Performed by: ANESTHESIOLOGY

## 2017-12-20 PROCEDURE — 87640 STAPH A DNA AMP PROBE: CPT | Performed by: THORACIC SURGERY (CARDIOTHORACIC VASCULAR SURGERY)

## 2017-12-20 PROCEDURE — 27211040 ZZH CONTINUOUS NEBULIZER MICRO PUMP

## 2017-12-20 PROCEDURE — 25000128 H RX IP 250 OP 636: Performed by: THORACIC SURGERY (CARDIOTHORACIC VASCULAR SURGERY)

## 2017-12-20 PROCEDURE — 84132 ASSAY OF SERUM POTASSIUM: CPT | Performed by: INTERNAL MEDICINE

## 2017-12-20 PROCEDURE — 85027 COMPLETE CBC AUTOMATED: CPT | Performed by: INTERNAL MEDICINE

## 2017-12-20 PROCEDURE — 27210338 ZZH CIRCUIT HUMID FACE/TRACH MSK

## 2017-12-20 PROCEDURE — 93308 TTE F-UP OR LMTD: CPT

## 2017-12-20 PROCEDURE — 87205 SMEAR GRAM STAIN: CPT | Performed by: SURGERY

## 2017-12-20 PROCEDURE — 99233 SBSQ HOSP IP/OBS HIGH 50: CPT | Mod: 25 | Performed by: INTERNAL MEDICINE

## 2017-12-20 PROCEDURE — 85018 HEMOGLOBIN: CPT | Performed by: SURGERY

## 2017-12-20 PROCEDURE — 93321 DOPPLER ECHO F-UP/LMTD STD: CPT | Mod: 26 | Performed by: INTERNAL MEDICINE

## 2017-12-20 PROCEDURE — 85730 THROMBOPLASTIN TIME PARTIAL: CPT | Performed by: INTERNAL MEDICINE

## 2017-12-20 PROCEDURE — 86140 C-REACTIVE PROTEIN: CPT | Performed by: INTERNAL MEDICINE

## 2017-12-20 PROCEDURE — 84100 ASSAY OF PHOSPHORUS: CPT | Performed by: INTERNAL MEDICINE

## 2017-12-20 PROCEDURE — 25000132 ZZH RX MED GY IP 250 OP 250 PS 637: Performed by: THORACIC SURGERY (CARDIOTHORACIC VASCULAR SURGERY)

## 2017-12-20 PROCEDURE — 25000125 ZZHC RX 250: Performed by: NEUROLOGICAL SURGERY

## 2017-12-20 PROCEDURE — 25000132 ZZH RX MED GY IP 250 OP 250 PS 637: Performed by: PHYSICIAN ASSISTANT

## 2017-12-20 PROCEDURE — 82330 ASSAY OF CALCIUM: CPT | Performed by: THORACIC SURGERY (CARDIOTHORACIC VASCULAR SURGERY)

## 2017-12-20 PROCEDURE — 87106 FUNGI IDENTIFICATION YEAST: CPT | Performed by: SURGERY

## 2017-12-20 PROCEDURE — 80048 BASIC METABOLIC PNL TOTAL CA: CPT | Performed by: INTERNAL MEDICINE

## 2017-12-20 PROCEDURE — 25000125 ZZHC RX 250: Performed by: INTERNAL MEDICINE

## 2017-12-20 PROCEDURE — 83735 ASSAY OF MAGNESIUM: CPT | Performed by: THORACIC SURGERY (CARDIOTHORACIC VASCULAR SURGERY)

## 2017-12-20 PROCEDURE — 94640 AIRWAY INHALATION TREATMENT: CPT

## 2017-12-20 PROCEDURE — 25000125 ZZHC RX 250: Performed by: THORACIC SURGERY (CARDIOTHORACIC VASCULAR SURGERY)

## 2017-12-20 PROCEDURE — 0B9M8ZX DRAINAGE OF BILATERAL LUNGS, VIA NATURAL OR ARTIFICIAL OPENING ENDOSCOPIC, DIAGNOSTIC: ICD-10-PCS | Performed by: ANESTHESIOLOGY

## 2017-12-20 PROCEDURE — 31624 DX BRONCHOSCOPE/LAVAGE: CPT

## 2017-12-20 PROCEDURE — 40000196 ZZH STATISTIC RAPCV CVP MONITORING

## 2017-12-20 PROCEDURE — 87077 CULTURE AEROBIC IDENTIFY: CPT | Performed by: SURGERY

## 2017-12-20 PROCEDURE — 25500064 ZZH RX 255 OP 636: Performed by: SURGERY

## 2017-12-20 PROCEDURE — 40000275 ZZH STATISTIC RCP TIME EA 10 MIN

## 2017-12-20 PROCEDURE — 25000125 ZZHC RX 250: Performed by: SURGERY

## 2017-12-20 PROCEDURE — 25000128 H RX IP 250 OP 636: Performed by: INTERNAL MEDICINE

## 2017-12-20 PROCEDURE — 82805 BLOOD GASES W/O2 SATURATION: CPT | Performed by: THORACIC SURGERY (CARDIOTHORACIC VASCULAR SURGERY)

## 2017-12-20 PROCEDURE — 94640 AIRWAY INHALATION TREATMENT: CPT | Mod: 76

## 2017-12-20 PROCEDURE — 84100 ASSAY OF PHOSPHORUS: CPT | Performed by: THORACIC SURGERY (CARDIOTHORACIC VASCULAR SURGERY)

## 2017-12-20 PROCEDURE — 87070 CULTURE OTHR SPECIMN AEROBIC: CPT | Performed by: SURGERY

## 2017-12-20 PROCEDURE — 25000125 ZZHC RX 250

## 2017-12-20 PROCEDURE — 85027 COMPLETE CBC AUTOMATED: CPT | Performed by: THORACIC SURGERY (CARDIOTHORACIC VASCULAR SURGERY)

## 2017-12-20 PROCEDURE — 80048 BASIC METABOLIC PNL TOTAL CA: CPT | Performed by: THORACIC SURGERY (CARDIOTHORACIC VASCULAR SURGERY)

## 2017-12-20 PROCEDURE — 00000146 ZZHCL STATISTIC GLUCOSE BY METER IP

## 2017-12-20 PROCEDURE — 85610 PROTHROMBIN TIME: CPT | Performed by: INTERNAL MEDICINE

## 2017-12-20 PROCEDURE — 87641 MR-STAPH DNA AMP PROBE: CPT | Performed by: THORACIC SURGERY (CARDIOTHORACIC VASCULAR SURGERY)

## 2017-12-20 PROCEDURE — 83735 ASSAY OF MAGNESIUM: CPT | Performed by: INTERNAL MEDICINE

## 2017-12-20 PROCEDURE — 25000128 H RX IP 250 OP 636

## 2017-12-20 PROCEDURE — 27210437 ZZH NUTRITION PRODUCT SEMIELEM INTERMED LITER

## 2017-12-20 PROCEDURE — 40000014 ZZH STATISTIC ARTERIAL MONITORING DAILY

## 2017-12-20 PROCEDURE — 25000128 H RX IP 250 OP 636: Performed by: NEUROLOGICAL SURGERY

## 2017-12-20 PROCEDURE — 25000128 H RX IP 250 OP 636: Performed by: PHYSICIAN ASSISTANT

## 2017-12-20 PROCEDURE — 25000128 H RX IP 250 OP 636: Performed by: SURGERY

## 2017-12-20 PROCEDURE — P9016 RBC LEUKOCYTES REDUCED: HCPCS | Performed by: INTERNAL MEDICINE

## 2017-12-20 PROCEDURE — 85018 HEMOGLOBIN: CPT | Performed by: INTERNAL MEDICINE

## 2017-12-20 PROCEDURE — 87186 SC STD MICRODIL/AGAR DIL: CPT | Performed by: SURGERY

## 2017-12-20 PROCEDURE — 40000048 ZZH STATISTIC DAILY SWAN MONITORING

## 2017-12-20 PROCEDURE — 93010 ELECTROCARDIOGRAM REPORT: CPT | Performed by: INTERNAL MEDICINE

## 2017-12-20 PROCEDURE — 93005 ELECTROCARDIOGRAM TRACING: CPT

## 2017-12-20 PROCEDURE — 20000004 ZZH R&B ICU UMMC

## 2017-12-20 PROCEDURE — 83605 ASSAY OF LACTIC ACID: CPT | Performed by: THORACIC SURGERY (CARDIOTHORACIC VASCULAR SURGERY)

## 2017-12-20 RX ORDER — VANCOMYCIN HYDROCHLORIDE 1 G/200ML
1000 INJECTION, SOLUTION INTRAVENOUS EVERY 12 HOURS
Status: DISCONTINUED | OUTPATIENT
Start: 2017-12-20 | End: 2017-12-20

## 2017-12-20 RX ORDER — VENLAFAXINE 50 MG/1
50 TABLET ORAL 2 TIMES DAILY
Status: COMPLETED | OUTPATIENT
Start: 2017-12-20 | End: 2017-12-22

## 2017-12-20 RX ORDER — VENLAFAXINE 50 MG/1
50 TABLET ORAL 2 TIMES DAILY
Status: DISCONTINUED | OUTPATIENT
Start: 2017-12-20 | End: 2017-12-20

## 2017-12-20 RX ORDER — FUROSEMIDE 10 MG/ML
10 INJECTION INTRAMUSCULAR; INTRAVENOUS ONCE
Status: COMPLETED | OUTPATIENT
Start: 2017-12-20 | End: 2017-12-20

## 2017-12-20 RX ORDER — LORAZEPAM 2 MG/ML
0.5 INJECTION INTRAMUSCULAR EVERY 4 HOURS PRN
Status: DISCONTINUED | OUTPATIENT
Start: 2017-12-20 | End: 2017-12-21

## 2017-12-20 RX ORDER — CARBOXYMETHYLCELLULOSE SODIUM 5 MG/ML
1 SOLUTION/ DROPS OPHTHALMIC 3 TIMES DAILY PRN
Status: DISCONTINUED | OUTPATIENT
Start: 2017-12-20 | End: 2018-01-04 | Stop reason: HOSPADM

## 2017-12-20 RX ORDER — HEPARIN SODIUM 5000 [USP'U]/.5ML
5000 INJECTION, SOLUTION INTRAVENOUS; SUBCUTANEOUS EVERY 8 HOURS
Status: DISCONTINUED | OUTPATIENT
Start: 2017-12-20 | End: 2018-01-04 | Stop reason: HOSPADM

## 2017-12-20 RX ORDER — LIDOCAINE HYDROCHLORIDE 10 MG/ML
INJECTION, SOLUTION EPIDURAL; INFILTRATION; INTRACAUDAL; PERINEURAL
Status: COMPLETED
Start: 2017-12-20 | End: 2017-12-20

## 2017-12-20 RX ADMIN — FAMOTIDINE 20 MG: 10 INJECTION, SOLUTION INTRAVENOUS at 09:42

## 2017-12-20 RX ADMIN — KETOTIFEN FUMARATE 1 DROP: 0.25 SOLUTION/ DROPS OPHTHALMIC at 20:26

## 2017-12-20 RX ADMIN — VENLAFAXINE 50 MG: 100 TABLET ORAL at 10:19

## 2017-12-20 RX ADMIN — HYDROMORPHONE HYDROCHLORIDE 0.2 MG: 1 INJECTION, SOLUTION INTRAMUSCULAR; INTRAVENOUS; SUBCUTANEOUS at 21:21

## 2017-12-20 RX ADMIN — DEXMEDETOMIDINE HYDROCHLORIDE 0.6 MCG/KG/HR: 4 INJECTION, SOLUTION INTRAVENOUS at 08:19

## 2017-12-20 RX ADMIN — NAFCILLIN: 10 INJECTION, POWDER, FOR SOLUTION INTRAVENOUS at 00:00

## 2017-12-20 RX ADMIN — MUPIROCIN 0.5 G: 20 OINTMENT TOPICAL at 10:03

## 2017-12-20 RX ADMIN — NAFCILLIN: 10 INJECTION, POWDER, FOR SOLUTION INTRAVENOUS at 07:00

## 2017-12-20 RX ADMIN — ACETYLCYSTEINE 2 ML: 100 SOLUTION ORAL; RESPIRATORY (INHALATION) at 08:40

## 2017-12-20 RX ADMIN — NAFCILLIN: 10 INJECTION, POWDER, FOR SOLUTION INTRAVENOUS at 03:00

## 2017-12-20 RX ADMIN — HUMAN INSULIN 5 UNITS/HR: 100 INJECTION, SOLUTION SUBCUTANEOUS at 01:11

## 2017-12-20 RX ADMIN — ACETYLCYSTEINE 2 ML: 100 SOLUTION ORAL; RESPIRATORY (INHALATION) at 12:30

## 2017-12-20 RX ADMIN — MIRTAZAPINE 30 MG: 15 TABLET, FILM COATED ORAL at 21:23

## 2017-12-20 RX ADMIN — NAFCILLIN: 10 INJECTION, POWDER, FOR SOLUTION INTRAVENOUS at 04:00

## 2017-12-20 RX ADMIN — EPINEPHRINE 0.07 MCG/KG/MIN: 1 INJECTION PARENTERAL at 06:08

## 2017-12-20 RX ADMIN — NOREPINEPHRINE BITARTRATE 0.03 MCG/KG/MIN: 1 INJECTION INTRAVENOUS at 00:27

## 2017-12-20 RX ADMIN — LIDOCAINE HYDROCHLORIDE: 10 INJECTION, SOLUTION EPIDURAL; INFILTRATION; INTRACAUDAL; PERINEURAL at 10:50

## 2017-12-20 RX ADMIN — NAFCILLIN: 10 INJECTION, POWDER, FOR SOLUTION INTRAVENOUS at 06:00

## 2017-12-20 RX ADMIN — KETOTIFEN FUMARATE 1 DROP: 0.25 SOLUTION/ DROPS OPHTHALMIC at 09:42

## 2017-12-20 RX ADMIN — ASPIRIN 81 MG CHEWABLE TABLET 81 MG: 81 TABLET CHEWABLE at 09:54

## 2017-12-20 RX ADMIN — DEXMEDETOMIDINE HYDROCHLORIDE 0.6 MCG/KG/HR: 4 INJECTION, SOLUTION INTRAVENOUS at 19:19

## 2017-12-20 RX ADMIN — VENLAFAXINE 50 MG: 100 TABLET ORAL at 20:25

## 2017-12-20 RX ADMIN — FUROSEMIDE 10 MG: 10 INJECTION, SOLUTION INTRAVENOUS at 05:36

## 2017-12-20 RX ADMIN — VANCOMYCIN HYDROCHLORIDE 1500 MG: 10 INJECTION, POWDER, LYOPHILIZED, FOR SOLUTION INTRAVENOUS at 21:26

## 2017-12-20 RX ADMIN — PROPOFOL 20 MCG/KG/MIN: 10 INJECTION, EMULSION INTRAVENOUS at 07:54

## 2017-12-20 RX ADMIN — SENNOSIDES AND DOCUSATE SODIUM 1 TABLET: 8.6; 5 TABLET ORAL at 20:18

## 2017-12-20 RX ADMIN — ACETAMINOPHEN 975 MG: 325 TABLET, FILM COATED ORAL at 01:15

## 2017-12-20 RX ADMIN — NAFCILLIN: 10 INJECTION, POWDER, FOR SOLUTION INTRAVENOUS at 02:00

## 2017-12-20 RX ADMIN — HUMAN ALBUMIN MICROSPHERES AND PERFLUTREN 6 ML: 10; .22 INJECTION, SOLUTION INTRAVENOUS at 11:15

## 2017-12-20 RX ADMIN — HEPARIN SODIUM 5000 UNITS: 5000 INJECTION, SOLUTION INTRAVENOUS; SUBCUTANEOUS at 10:01

## 2017-12-20 RX ADMIN — VANCOMYCIN HYDROCHLORIDE 1000 MG: 1 INJECTION, SOLUTION INTRAVENOUS at 10:14

## 2017-12-20 RX ADMIN — FAMOTIDINE 20 MG: 10 INJECTION, SOLUTION INTRAVENOUS at 20:18

## 2017-12-20 RX ADMIN — PIPERACILLIN SODIUM AND TAZOBACTAM SODIUM 4.5 G: 36; 4.5 INJECTION, POWDER, LYOPHILIZED, FOR SOLUTION INTRAVENOUS at 14:05

## 2017-12-20 RX ADMIN — ACETYLCYSTEINE 2 ML: 100 SOLUTION ORAL; RESPIRATORY (INHALATION) at 00:20

## 2017-12-20 RX ADMIN — POTASSIUM CHLORIDE 20 MEQ: 1.5 POWDER, FOR SOLUTION ORAL at 01:15

## 2017-12-20 RX ADMIN — Medication 300 MG: at 09:55

## 2017-12-20 RX ADMIN — DEXMEDETOMIDINE HYDROCHLORIDE 0.4 MCG/KG/HR: 4 INJECTION, SOLUTION INTRAVENOUS at 00:22

## 2017-12-20 RX ADMIN — HYDROMORPHONE HYDROCHLORIDE 0.2 MG: 1 INJECTION, SOLUTION INTRAMUSCULAR; INTRAVENOUS; SUBCUTANEOUS at 17:41

## 2017-12-20 RX ADMIN — KETOTIFEN FUMARATE 1 DROP: 0.25 SOLUTION/ DROPS OPHTHALMIC at 16:56

## 2017-12-20 RX ADMIN — FENTANYL CITRATE 100 MCG/HR: 50 INJECTION, SOLUTION INTRAMUSCULAR; INTRAVENOUS at 14:47

## 2017-12-20 RX ADMIN — ACETAMINOPHEN 975 MG: 325 TABLET, FILM COATED ORAL at 17:41

## 2017-12-20 RX ADMIN — ACETAMINOPHEN 975 MG: 325 TABLET, FILM COATED ORAL at 10:00

## 2017-12-20 RX ADMIN — MULTIVITAMIN 15 ML: LIQUID ORAL at 13:54

## 2017-12-20 RX ADMIN — SENNOSIDES AND DOCUSATE SODIUM 2 TABLET: 8.6; 5 TABLET ORAL at 09:54

## 2017-12-20 RX ADMIN — LORAZEPAM 0.5 MG: 2 INJECTION INTRAMUSCULAR; INTRAVENOUS at 18:12

## 2017-12-20 RX ADMIN — PIPERACILLIN SODIUM AND TAZOBACTAM SODIUM 4.5 G: 36; 4.5 INJECTION, POWDER, LYOPHILIZED, FOR SOLUTION INTRAVENOUS at 20:21

## 2017-12-20 RX ADMIN — ATORVASTATIN CALCIUM 40 MG: 40 TABLET, FILM COATED ORAL at 09:54

## 2017-12-20 RX ADMIN — HUMAN INSULIN 2 UNITS/HR: 100 INJECTION, SOLUTION SUBCUTANEOUS at 06:07

## 2017-12-20 RX ADMIN — MUPIROCIN 0.5 G: 20 OINTMENT TOPICAL at 09:55

## 2017-12-20 RX ADMIN — NAFCILLIN: 10 INJECTION, POWDER, FOR SOLUTION INTRAVENOUS at 05:00

## 2017-12-20 RX ADMIN — NAFCILLIN 12 G: 10 INJECTION, POWDER, FOR SOLUTION INTRAVENOUS at 00:36

## 2017-12-20 RX ADMIN — HEPARIN SODIUM 5000 UNITS: 5000 INJECTION, SOLUTION INTRAVENOUS; SUBCUTANEOUS at 17:41

## 2017-12-20 RX ADMIN — NAFCILLIN: 10 INJECTION, POWDER, FOR SOLUTION INTRAVENOUS at 01:00

## 2017-12-20 RX ADMIN — Medication 300 MG: at 20:24

## 2017-12-20 NOTE — PROGRESS NOTES
CVTS ICU Progress Note  Attending provider: Pili Bowers,*      SUBJECTIVE:    No acute events, green secretions from ET tube    OBJECTIVE:   Vitals:  Temp:  [96.8  F (36  C)-100.6  F (38.1  C)] 99.7  F (37.6  C)  Heart Rate:  [76-93] 77  Resp:  [20-22] 20  BP: (82)/(58) 82/58  MAP:  [56 mmHg-90 mmHg] 66 mmHg  Arterial Line BP: ()/(44-77) 87/58  FiO2 (%):  [40 %-90 %] 90 %  SpO2:  [89 %-100 %] 100 %    Physical Exam:  Gen: Intubated/sedated, NAD  Neck: + JVD   CV: RRR, no murmurs, rubs or gallops   Pulm: Lungs diminished in bases,  no wheezing or rhonchi  Abd: Soft, NT, ND, +BS  Ext: Trace LE edema  Neuro: nonfocal  Incision: c/d/i, no erythema, sternum stable  Tubes/drains: Dressing clean and dry, 440cc/230cc serosanguinous output, no air leak.     I&O's:  I/O last 3 completed shifts:  In: 79858 [I.V.:4617; Other:577; NG/GT:180; IV Piggyback:500]  Out: 3553 [Urine:1883; Blood:1000; Chest Tube:670]    Ventilator Settings:  Ventilation Mode: CMV/AC  FiO2 (%): 90 %  Rate Set (breaths/minute): 20 breaths/min  Tidal Volume Set (mL): 450 mL  PEEP (cm H2O): 5 cmH2O  Oxygen Concentration (%): 90 %  Resp: 20    Labs:   [facility]  Recent Labs  Lab 12/20/17  0748 12/20/17  0412 12/20/17  0002 12/19/17  1806 12/19/17  1650  12/19/17  0606   NA  --  144 144 145* 143  < > 138   POTASSIUM 4.3 4.2 3.8 3.4 3.1*  < > 3.3*   CHLORIDE  --  109 109 109  --   --  100   IZABELLA  --  8.2* 7.8* 8.0*  --   --  8.6   CO2  --  25 26 29  --   --  29   BUN  --  13 11 10  --   --  9   CR  --  1.33* 1.22 1.16  --   --  1.38*   GLC  --  160* 179* 142* 112*  < > 112*   < > = values in this interval not displayed.  CBC  Recent Labs  Lab 12/20/17  0748 12/20/17  0412 12/20/17  0002 12/19/17  1806 12/19/17  1653  12/19/17  0606   WBC  --  15.5* 16.3* 16.1*  --   --  11.2*   RBC  --  2.62* 2.28* 3.04*  --   --  3.39*   HGB 8.1* 8.0* 7.0* 9.4* 9.6*  < > 10.7*   HCT  --  24.0* 21.4* 29.0*  --   --  34.0*   MCV  --  92 94 95  --   --  100   MCH  --   30.5 30.7 30.9  --   --  31.6   MCHC  --  33.3 32.7 32.4  --   --  31.5   RDW  --  18.0* 17.9* 17.4*  --   --  16.6*   PLT  --  358 338 303 283  < > 438   < > = values in this interval not displayed.  INR  Recent Labs  Lab 12/20/17  0748 12/19/17  1806 12/19/17  1653 12/19/17  1515   INR 1.29* 0.90 0.81* 1.56*      Hepatic Panel   Recent Labs  Lab 12/18/17  0359 12/17/17  2024 12/17/17  0019   AST 32 29 33   ALT 30 32 34   ALKPHOS 92 94 102   BILITOTAL 1.3 1.0 1.3   ALBUMIN 1.5* 1.6* 1.6*     Glucose  Recent Labs  Lab 12/20/17  1036 12/20/17  0918 12/20/17  0745 12/20/17  0613 12/20/17  0412 12/20/17  0403 12/20/17  0326  12/20/17  0002  12/19/17  1806 12/19/17  1650 12/19/17  1617 12/19/17  1513   GLC  --   --   --   --  160*  --   --   --  179*  --  142* 112* 118* 140*   * 143* 107* 122*  --  160* 162*  < >  --   < >  --   --   --   --    < > = values in this interval not displayed.  Blood Gas  Recent Labs  Lab 12/20/17  0748 12/20/17  0646 12/20/17  0359 12/19/17  1950 12/19/17  1806   PH 7.43  --  7.43 7.44 7.45   PCO2 37  --  39 33* 40   PO2 281*  --  116* 117* 209*   HCO3 25  --  25 22 27   O2PER 90.0  90.0 40.0 40  40 40%  40% 70%  70%       Imaging:   CXR 12/20:  1.  Endotracheal tube projects approximately 1.4 cm above the bart.  2.  Trace pneumomediastinum/left pneumothorax. Recommend attention on  follow-up.  3.  Slight increased postoperative atelectasis/pulmonary edema.      ASSESSMENT: Cricket Miguel is a 62 year old male with hx of COPD, HTN, HLD, CAD, HFpEF, aortic stenosis and ascending aortic aneurism s/p aortic valve replacement and aortic root replacement (April 2016) c/b moises-aortic abscess, endocarditis, cerebral septic emboli and severe AI s/p redo-sternotomy aortic root and valve homograft 12/19.       Neuro/ pain/ sedation: Hx of multifocal acute infarctions involving the left parietotemporal lobes, left cerebral hemisphere and right occipital lobe presumed to be septic  emboli. Depression, mild delirium, followed by psych.  -Monitor neurological status. Notify the MD for any acute changes in exam.  -fentanyl ggt for pain.  -precedex/propofol for sedation.  When able, restart psychia-tric medications per psych recommendations:   1.  He may discontinue the Abilify.   2.  Drop the Effexor to 50 mg twice a day for 3 days and then discontinue.   3.  Continue Remeron to 30 mg at bedtime.   4.  It is okay to continue the Ativan 0.5- 1.0 mg q.6 h. IV p.r.n. anxiety.   5.  Seroquel 25 mg q.i.d. p.r.n. anxiety and agitation.   6.  Haldol 1-2 mg IV q.4h. for severe agitation.   7.  Reconsult Psychiatry as needed.      Pulmonary care: Intubated and mechanically ventilated.   - Bronch this PM given secretions. Cultures sent, Wean to extubation      Cardiovascular:  HTN, HLD, HFpEF, CAD, AS and aortic aneurism s/p bental c/b endocarditis and severe AI s/p aortic root/valve homograft 12/19.  -Epinephrine to maintain MAP>65  -Keep SBP<120  - Restart PTA ASA, lipitor  - Echo today, consider heart failure consult      GI care: OGT  -OGT to LIS      Fluids/ Electrolytes/ Nutrition:   -PRN electrolyte replacement, speech to see once extubated     Renal/ Fluid Balance: Urine output is adequate so far.  -Espinoza catheter for strict intake and output.      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 Hx of MSSA septic arthritis and bacteremia. Hx of multifocal acute infarctions involving the left parietotemporal lobes, left cerebral hemisphere and right occipital lobe presumed to be septic emboli. ID following.   -per ID, continue nafcillin, rifampin,   -Repeat blood cultures if febrile. Bronch cultures sent, added vanc/zosyn 12/20  -F/u surgical tissue cultures      Heme: acute blood loss anemia  -Given 5 U FFP, 4 U PRBCs, 2 U Plt, 2 U cryo, 550 cell saver intraop  -Trend CBC, will give 1 U PRBCs today for low SVO2      Prophylaxis:     -Mechanical prophylaxis for DVT.   - Subq hep, Pepcid BID       Lines/ tubes/ drains:  -R IJI CVC, L radial arterial line, R triple lumen PICC, garza, OGT, PIVs      Disposition:  -CVICU.     Staff surgeons have been informed of changes through both verbal and written communication.         Benny Unger PA-C  Cardiothoracic Surgery  December 20, 2017 12:04 PM   p: [phone]

## 2017-12-20 NOTE — PHARMACY-VANCOMYCIN DOSING SERVICE
Pharmacy Vancomycin Initial Note  Date of Service 2017  Patient's  1955  62 year old, male    Indication: Aspiration Pneumonia    Current estimated CrCl = Estimated Creatinine Clearance: 59.4 mL/min (based on Cr of 1.33).    Creatinine for last 3 days  2017:  8:24 PM Creatinine 1.22 mg/dL  2017:  3:59 AM Creatinine 1.26 mg/dL  2017:  6:06 AM Creatinine 1.38 mg/dL;  6:06 PM Creatinine 1.16 mg/dL  2017: 12:02 AM Creatinine 1.22 mg/dL;  4:12 AM Creatinine 1.33 mg/dL    Recent Vancomycin Level(s) for last 3 days  No results found for requested labs within last 72 hours.      Vancomycin IV Administrations (past 72 hours)                   vancomycin (VANCOCIN) 1000 mg in dextrose 5% 200 mL PREMIX (mg) 1,000 mg New Bag 17 1014    vancomycin (VANCOCIN) 1000 mg in dextrose 5% 200 mL PREMIX (g) 1 g Given 17 0900                Nephrotoxins and other renal medications (Future)    Start     Dose/Rate Route Frequency Ordered Stop    17 1000  vancomycin (VANCOCIN) 1,500 mg in NaCl 0.9 % 250 mL intermittent infusion      20 mg/kg × 84.6 kg  over 90 Minutes Intravenous ONCE 17 1319      17 1300  piperacillin-tazobactam (ZOSYN) 4.5 g in 20 mL NS Premix Syringe      4.5 g  over 3 Minutes Intravenous EVERY 6 HOURS 17 1246      17 0945  vancomycin (VANCOCIN) 1000 mg in dextrose 5% 200 mL PREMIX      1,000 mg  200 mL/hr over 1 Hours Intravenous EVERY 12 HOURS 17 0935 17 0944    17 0030  norepinephrine (LEVOPHED) 16 mg in D5W 250 mL infusion      0.03-0.4 mcg/kg/min × 78 kg  2.2-29.3 mL/hr  Intravenous CONTINUOUS 17 0027      17 0800  rifampin (REIFADEN) suspension 300 mg      300 mg Oral or Feeding Tube 2 TIMES DAILY 17 0047            Contrast Orders - past 72 hours (72h ago through future)    Start     Dose/Rate Route Frequency Ordered Stop    17 1117  perflutren diluted 1mL to 2mL with saline (OPTISON)  diluted injection 6 mL      6 mL Intravenous ONCE 12/20/17 1109 12/20/17 1115                Plan:  1.  Start vancomycin  1500 mg IV q12h.   2.  Goal Trough Level: 15-20 mg/L   3.  Pharmacy will check trough levels as appropriate in 1-3 Days.    4. Serum creatinine levels will be ordered daily for the first week of therapy and at least twice weekly for subsequent weeks.    5. [place] method utilized to dose vancomycin therapy: Method 2    Daniela Winter, PharmD

## 2017-12-20 NOTE — PLAN OF CARE
Problem: Patient Care Overview  Goal: Plan of Care/Patient Progress Review  Outcome: No Change  Pt back from surgery around 1745, stable, on only epi at this time. Son at bedside udpated of plan to leave intubated through the night. Soft pressures, 250cc albumin given. Waiting lab results. Will continue to monitor and follow plan of care.     Problem: Restraint for Non-Violent/Non-Self-Destructive Behavior  Goal: Prevent/Manage Potential Problems  Maintain safety of patient and others during period of restraint.  Promote psychological and physical wellbeing.  Prevent injury to skin and involved body parts.  Promote nutrition, hydration, and elimination.   Outcome: No Change  Pt placed in restraints for safety following surgery.

## 2017-12-20 NOTE — PROGRESS NOTES
CVICU PROGRESS NOTE: 12/20/2017  ASSESSMENT: Cricket Miguel is a 62 year old male with hx of COPD, HTN, HLD, CAD, HFpEF, aortic stenosis and ascending aortic aneurism s/p aortic valve replacement and aortic root replacement (April 2016) c/b moises-aortic abscess, endocarditis, cerebral septic emboli and severe AI s/p redo-sternotomy aortic root and valve homograft 12/19.       PLAN:   Neuro/ pain/ sedation: Hx of multifocal acute infarctions involving the left parietotemporal lobes, left cerebral hemisphere and right occipital lobe presumed to be septic emboli. Depression, mild delirium, followed by psych.  -Monitor neurological status. Notify the MD for any acute changes in exam.  -fentanyl gtt for pain.  -precedex/propofol for sedation.  psych recommendations:   1.  He may discontinue the Abilify.   2.  Drop the Effexor to 50 mg twice a day for 3 days and then discontinue.   3.  Increase Remeron to 30 mg at bedtime.   4.  It is okay to continue the Ativan 0.5- 1.0 mg q.6 h. IV p.r.n. anxiety.   5.  Seroquel 25 mg q.i.d. p.r.n. anxiety and agitation.   6.  Haldol 1-2 mg IV q.4h. for severe agitation.   7.  Reconsult Psychiatry as needed.      Pulmonary care: Intubated and mechanically ventilated.   -Wean to extubate as able.  -Greenish sputum from ETT. Will perform diagnostic bronchoscopy today.     Cardiovascular:  HTN, HLD, HFpEF, CAD, AS and aortic aneurism s/p bental c/b endocarditis and severe AI s/p aortic root/valve homograft 12/19.  -Epinephrine to maintain MAP>65  -Keep SBP<120  -holding pta HCTZ, labetalol  -restarted ASA 81mg and atorvastatin.  -Repeat Echo shows EF 20-25% baseline 30%     GI care: OGT  -OGT to LIS  -famotidine  -senna/colace     Fluids/ Electrolytes/ Nutrition:   -PRN electrolyte replacement  -No indication for parenteral nutrition.     Renal/ Fluid Balance: Urine output is adequate so far.  -Espinoza catheter for strict intake and output.  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 Hx of MSSA septic arthritis and bacteremia. Hx of multifocal acute infarctions involving the left parietotemporal lobes, left cerebral hemisphere and right occipital lobe presumed to be septic emboli. ID following.   -per ID, continue nafcillin, rifampin  -Repeat blood cultures if febrile  -F/u surgical tissue cultures   Heme: acute blood loss anemia  - CBC downtrending. Gave 1upRBCs last night. Giving another unit today. Coags WNL     Prophylaxis:    -Mechanical prophylaxis for DVT.   -start SC heparin.      Lines/ tubes/ drains:  -R IJI CVC, L radial arterial line, R triple lumen PICC, garza, OGT, PIVs     Disposition:  -CVICU.     Patient seen, findings and plan discussed with CVICU staff Susan Boyce MD  CA-3/PGY-4  12/20/2017  10:59 AM      - - - - - - - - - - - - - - - - - - - - - - - - - - - - - - - - - - - - - - - - - - - - - - - - - - - - - - - - - - - - - - - - - - - - - - - -     PRIMARY TEAM: CVTS  PRIMARY PHYSICIAN: Jero    REASON FOR CRITICAL CARE ADMISSION: s/p aortic root valve homograft   ADMITTING PHYSICIAN: Jero    HISTORY PRESENTING ILLNESS: Cricket Miguel is a 62 year old male with hx of COPD, HTN, HLD, CAD, HFpEF, aortic stenosis and ascending aortic aneurism s/p aortic valve replacement and aortic root replacement (April 2016) c/b moises-aortic abscess, endocarditis, cerebral septic emboli and severe AI s/p redo-sternotomy aortic root and valve homograft 12/19.     PHYSICAL EXAMINATION:  Temp:  [96.8  F (36  C)-100.6  F (38.1  C)] 100.2  F (37.9  C)  Heart Rate:  [81-93] 81  Resp:  [20-22] 20  BP: (82)/(58) 82/58  MAP:  [56 mmHg-90 mmHg] 70 mmHg  Arterial Line BP: ()/(44-77) 113/57  FiO2 (%):  [40 %-90 %] 90 %  SpO2:  [89 %-100 %] 100 %  General: intubated and sedated   HEENT: Normocephalic, atraumatic.    Chest wall: Symmetric thorax. Median sternotomy dressing c/d/i.    Respiratory: Equal BS bilateraly. Intubated and mechanically ventilated.  Cardiovascular: RRR, chest tubes draining serosanguinous fluid. Cool extremities  Gastrointestinal: Abdomen soft, non-distended.  Genitourinary: Espinoza catheter in place.   Skin: As noted above. No rashes or lesions appreciated.   Lines: R IJ CVC, L radial arterial line, OGT. R PICC      Recent Labs  Lab 12/20/17  0748 12/20/17  0359 12/19/17  1950 12/19/17  1806   PH 7.43 7.43 7.44 7.45   PCO2 37 39 33* 40   PO2 281* 116* 117* 209*   HCO3 25 25 22 27     Complete Blood Count     Recent Labs  Lab 12/20/17  0748 12/20/17  0412 12/20/17  0002 12/19/17  1806 12/19/17  1653  12/19/17  0606   WBC  --  15.5* 16.3* 16.1*  --   --  11.2*   HGB 8.1* 8.0* 7.0* 9.4* 9.6*  < > 10.7*   PLT  --  358 338 303 283  < > 438   < > = values in this interval not displayed.  Basic Metabolic Panel    Recent Labs  Lab 12/20/17  0748 12/20/17  0412 12/20/17  0002 12/19/17  1806 12/19/17  1650  12/19/17  0606   NA  --  144 144 145* 143  < > 138   POTASSIUM 4.3 4.2 3.8 3.4 3.1*  < > 3.3*   CHLORIDE  --  109 109 109  --   --  100   CO2  --  25 26 29  --   --  29   BUN  --  13 11 10  --   --  9   CR  --  1.33* 1.22 1.16  --   --  1.38*   GLC  --  160* 179* 142* 112*  < > 112*   < > = values in this interval not displayed.  Liver Function Tests    Recent Labs  Lab 12/20/17  0748 12/19/17  1806 12/19/17  1653 12/19/17  1515  12/18/17  0359 12/17/17 2024 12/17/17  0019   AST  --   --   --   --   --  32 29 33   ALT  --   --   --   --   --  30 32 34   ALKPHOS  --   --   --   --   --  92 94 102   BILITOTAL  --   --   --   --   --  1.3 1.0 1.3   ALBUMIN  --   --   --   --   --  1.5* 1.6* 1.6*   INR 1.29* 0.90 0.81* 1.56*  < > 1.12 1.10 1.06   < > = values in this interval not displayed.  Pancreatic Enzymes  No lab results found in last 7 days.  Coagulation Profile    Recent Labs  Lab 12/20/17  0748 12/19/17  1806 12/19/17  1653 12/19/17  1515   INR 1.29* 0.90 0.81* 1.56*   PTT  39* 52* 43* 37

## 2017-12-20 NOTE — PROGRESS NOTES
Patien's blood pressure remained labile during shift. HR 80-90, MAP 55-80s. Patient becomes symptomatic when rhythm converts to a first degree AVB from sinus. Rhythm converts frequently and MAPs will drop from 70s to 55-60. Epi 0.06-0.1 mcg/kg/min and Levo 0.03-0.05 mcg/kg/min for drops in MAP. Moonlighter notified of rhythm change affecting BP with heart rate unaffected. Lethargic but does open eyes to voice and follow commands. Patient is noticeably weaker on right side with left-sided facial droop (baseline). Tmax 99.5 F. Chest tube output 30-50 cc/hr.   Urine output decreasing to 25-35 cc/hr. MD notified- ordered to give 10 mg IV lasix at 0530.     Patient received 750 cc albumin and 500 cc LR for CVP of 6 and labile MAPs. CVP now increased to 13-14. Received 1 PRBC for Hb of 7 --> increased to 8.     CI 1.8-3.1, PA 28-36/12-18, CVP 6-14, -1400, SvO2 43-68, CO 3.5-6. MD aware of SvO2 dropping from mid-50s to mid-40s.     Currently Epi @ 0.08 mcg/kg/min, Levo @ 0.03 mcg/kg/min, Fentanyl @ 50 mcg/hr, Precedex @ 0.4 mcg/kg/min, Insulin @ 7 units/hr, D545 with 20K @ 75, Nafcillin @ 41.7 cc/hr.     Plan is to continue to wean pressors as tolerated. Will continue to monitor patient closely and update MDs as needed.

## 2017-12-20 NOTE — OP NOTE
DATE OF SERVICE:  12/19/2017.      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      SURGEON:  Pili Bowers MD      COSURGEON:  Charles Nogueira MD      Please note that a cosurgeon was necessary due to the complexity of the operation and the lack of a qualified resident to assist in the operation.      ASSISTANT:  Onur Chávez MD      NAME OF OPERATION:  Redo sternotomy, femoral arterial and venous cannulation for cardiopulmonary bypass, aortic root and aortic valve replacement with a 26 mm CryoLife valve conduit homograft, intraoperative OWEN.      ANESTHESIA:  General orotracheal.      INDICATIONS FOR PROCEDURE:  Mr. Cricket Miguel is a very pleasant 62-year-old gentleman who underwent a Bentall procedure with an aortic valve composite conduit graft with a 27 mm St. Isidro Trifecta (bovine pericardial) valve and a 30 mm Cardioroot Valsalva graft in 05/2016.  He did well postoperatively but he presented with a stroke in October presumably from septic emboli from MSSA prosthetic aortic valve endocarditis.  He was also noted to have a very large seroma around the aortic root graft which did not have any evidence of pseudoaneurysm at that time.  Given the severity of his stroke, he was deemed not a surgical candidate at that time and was discharged to a LTAC, with a tracheostomy and PEG.  He recovered quite well from a neurologic standpoint and went home a couple weeks ago and started PT, OT.  He was still nonambulatory but was recovering well and was conversant and communicative.  He presented over the weekend again with worsening shortness of breath and symptoms of heart failure.  A  repeat echocardiogram demonstrated disruption of his prior aortic root valve conduit from the aortic annulus with a large contained pseudoaneurysm surrounding the graft.  This was confirmed on CT angiogram of the chest.  He was taken to the operating today for redo sternotomy and replacement of his infected/dehisched valve conduit with a homograft.      OPERATIVE FINDINGS:  The patient had a LVEF around 30%.  The previous valve conduit was indeed completely disrupted from the aortic annulus.  Both the left and right coronary buttons were intact.  There was no gross purulence, but there were definite signs of infection around the pseudoaneurysm which was well contained.  A Stat Gram stain of the pseudoaneurysm did not show any organisms.      DESCRIPTION OF PROCEDURE:  After informed consent was obtained, the patient was brought down to the operating room and was placed on the OR table in a supine position.  Intravenous and intra-arterial lines were begun.  Warwick-Amor catheter was also placed.  While monitoring his blood pressure and EKG tracing he was anesthetized and intubated using a single lumen endotracheal tube.  His entire chest, abdomen, both groins and legs were prepped down to the toes using multiple layers of DuraPrep.  He was draped in a sterile field.  A 4 cm incision was made on the right groin parallel to the inguinal crease 1 cm above and the common femoral artery and vein were dissected out.  The patient was fully heparinized.  A 5-0 Prolene pursestring sutures were made in the femoral artery and vein.  Using Seldinger technique and OWEN guidance a 17-Tanzanian femoral arterial cannula and a 25-Tanzanian multistage venous cannulae were appropriately positioned and the patient was placed on cardiopulmonary bypass after appropriate ACT level was achieved.  The patient was cooled down to 26 degrees Celsius.  A redo median sternotomy was then performed using a hand held oscillating saw after removing all sternal  wires.  The chest was safely entered.  Electrocautery was used to dissect around the pseudoaneurysm and the heart.  The aorta was then dissected out all the way up to the arch.  A retrograde cardioplegia catheter was placed into the coronary sinus without difficulty.  An LV vent was placed via the right superior pulmonary vein as well.  Carbon dioxide was flooded into mediastinal wound to prevent air embolism.  The aorta was then cross-clamped and a liter of retrograde cardioplegia was given to arrest the heart.  Following this, intermittent retrograde cardioplegia doses were given on average every 15 minutes for myocardial protection while the aorta was crossclamped.      The pseudoaneurysm was entered and the graft was completely excised by saving the right and left coronary buttons.  The aortic annulus was examined and carefully debrided.  A 26 mm CryoLife homograft was brought to the field and was prepared.  Three mm of cuff was preserved around the aortic valve hinge point.  The native aorta was trimmed all the way up to the distal ascending aorta.  Simple interrupted 2-0 Ethibond sutures were placed around the entirety of the patient's remaining aortic annulus and the sutures were placed into the homograft outside of the aortic valve hinge point and the homograft was seated down and all sutures tied down securely.  Next, two aortotomies were created in the homograft to sew the left and right coronary buttons in this appropriate anatomic location using interrupted 5-0 Prolene in a running fashion.  The homograft was then trimmed to appropriate length and the graft to native aorta anastomosis was performed in end-to-end fashion using running 4-0 Prolene.  A liter of warm blood cardioplegia was given as a retrograde hot shot and the aortic crossclamp was removed.  Aortic crossclamp time was 182 minutes.  The left ventricle and the ascending aorta were continuously vented to deair the heart.  The patient was  eventually weaned off cardiopulmonary bypass with epinephrine and Levophed support.  Total cardiopulmonary bypass time was 284 minutes.  The aortic valve was fully competent and there was no aortic valve insufficiency.  LV function was around 30%.  Once the heart was adequately deaired and the patient remained stable, the venous and arterial femoral cannulae were removed and protamine was administered.  Temporary ventricular pacing wires placed in the RV muscle.  A 32-Gibraltarian angled and straight chest tubes placed in mediastinum.  Next 32-Gibraltarian pleural drains were placed in the right and left pleural space as well.  These were all brought out percutaneously below the sternotomy incision, secured to skin using 2-0 Ethibond.  Mediastinum was irrigated with antibiotic saline and hemostasis was achieved.  The sternum was reapproximated using multiple interrupted single and double wires.  The incision was closed in layers of Vicryl suture.  Skin was closed with 3-0 Vicryl and was sealed using Dermabond.  The groin incision was also closed using running layers of Vicryl suture.  Skin was closed using 0 Vicryl.        There were no intraoperative complications and the patient tolerated the operation well.  All sponge counts, needle counts and instrument counts were correct x2 at the end of the operation.  EBL unknown.        SPECIMEN REMOVED:  Prior aortic valve conduit.      The patient was brought to the ICU in hemodynamically stable condition and remained intubated.         DOMINICK BAE MD             D: 2017 20:39   T: 2017 08:26   MT:       Name:     ASHTYN STROUD   MRN:      [id number]        Account:        [id number]   :      1955           Procedure Date: 2017      Document: [id number]       cc: Alfredito Garzon MD

## 2017-12-20 NOTE — PHARMACY-CONSULT NOTE
Pharmacy Tube Feeding Consult    Medication reviewed for administration by feeding tube and for potential food/drug interactions.    Recommendation: No changes are needed at this time.     Pharmacy will continue to follow as new medications are ordered.    Javed HemphillD  CVICU and Advanced Heart Failure Pharmacist  Pager [phone]

## 2017-12-20 NOTE — PLAN OF CARE
Problem: Patient Care Overview  Goal: Plan of Care/Patient Progress Review  OT 4E: Cancel.  Upon chart review and discussion with RN, pt not medically appropriate for therapy at this time due to hemodynamic instability.

## 2017-12-20 NOTE — ANESTHESIA CARE TRANSFER NOTE
Patient: Cricket Miguel    Procedure(s):  REDO Median STERNOTOMY, FEMORAL CANNULATION, Lysis of adhesions, AORTIC ROOT VALVE HOMOGRAFT with 26mm x 7.0cm Cryolife Aortic valve and conduit - Wound Class: I-Clean    Diagnosis: AORTIC ROOT SUDO ANEURYSM  Diagnosis Additional Information: No value filed.    Anesthesia Type:   No value filed.     Note:  Airway :Ventilator  Patient transferred to:ICU  Comments: Patient transported from OR 24 to ICU with full monitors on and ventilation via ambu.  Patient tolerated transport.  Placed on ICU vent upon arrival with EBBS ( RR 18 Peep 5 and Fio2 75%).  Report given to RN and all questions answered.ICU Handoff: Call for PAUSE to initiate/utilize ICU HANDOFF, Identified Patient, Identified Responsible Provider, Reviewed the Pertinent Medical History, Discussed Surgical Course, Reviewed Intra-OP Anesthesia Management and Issues during Anesthesia, Set Expectations for Post Procedure Period and Allowed Opportunity for Questions and Acknowledgement of Understanding      Vitals: (Last set prior to Anesthesia Care Transfer)    CRNA VITALS  12/19/2017 1721 - 12/19/2017 1806      12/19/2017             ART BP: 109/57    ART Mean: 79    Ht Rate: 87    Resp Rate (set): 10                Electronically Signed By: BIBI Merrill CRNA  December 19, 2017  6:06 PM

## 2017-12-20 NOTE — PROGRESS NOTES
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 TTE negative.  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, currently on treatment with IV nafcillin and PO rifampin  - left knee MSSA septic arthritis 10/2017  - multifocal acute infarctions involving the left parietotemporal lobes, left cerebral hemisphere, and right occipital lobe presumed to be septic emboli    RECOMMENDATIONS:   - continue nafcillin 12g IV, continuous infusion  - continue rifampin 300mg PO BID  - awaiting intraoperative tissue cultures  - agree with obtaining sputum culture with gram stain  - fever could be due to recent surgery. If he clinically worsens, would stop nafcillin and start vancomycin IV and zosyn 4.5g IV q6    DISCUSSION:   62 year old male with known 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 At this time, it's unclear if the dehiscence of the aortic graft is due to a) worsening, uncontrolled infection, or b) mechanical issue with the graft or valve itself. Intraoperative cultures will be essential in determining whether there is an ongoing infection and are currently pending For now, recommend continuing the IV nafcillin and PO rifampin. He developed a fever morning of , which could be related to his recent surgery. Staff noticed increased secretions from ETT and are planning on sending a sample for culture. If he were to decompensate, would broaden antibiotics to vancomycin and zosyn.     Above discussed with  Infectious Diseases Staff, Dr. Voss.  ID will continue to follow.      Kinjal Duarte MD  [facility] ID Fellow  [phone]      Attestation:  I have reviewed today's vital signs, medications, labs and imaging.  Floor time: 25 minutes, Face-to-face time: 10 minutes, Total time: 35 minutes    Cricket Miguel was seen in the hospital by Cynthia Voss on 12/20/2017, with the fellow, Dr. Kinjal Duarte MD. [facility] Infectious Diseases Fellow. I reviewed the history & exam. Assessment and plan were jointly made.  I agree with and have edited the fellow's note and plan of care.      Cynthia Voss MD.  ID Staff  [id number]         CHIEF INFECTIOUS DISEASES COMPLAINT:  History of MSSA prosthetic valve endocarditis, now with pseudoaneurysm and moises-graft leak    INTERVAL HISTORY:   Per brief post-op note, no evidence of purulence in the OR on 12/19. Low grade fever on 12/20 with increased sputum production.      ROS: A five-point review of systems was obtained and was negative with the exception of that which is described above.  Allergies   Allergen Reactions     Lisinopril Swelling     One-sided facial swelling after being on lisinopril/HCTZ for one week.      Allergies were reviewed.    No current outpatient prescriptions on file.       CURRENT ANTI-INFECTIVES:   - nafcillin 12g IV continuous infusion 10/7/2017--present  - rifampin 300mg PO BID 10/19--present    EXAMINATION:   BP (!) 82/58  Temp 99.3  F (37.4  C) (Pulmonary Artery)  Resp 20  Wt 84.6 kg (186 lb 8.2 oz)  SpO2 100%  BMI 28.36 kg/m2  GENERAL:  Intubated and sedated  EYES:  Eyes have anicteric sclerae without conjunctival injection.  ENT:  ETT in place  NECK:  Supple. No cervical lymphadenopathy.  LUNGS:  Coarse bilaterally on anterior exam.   CARDIOVASCULAR:  Regular rate and rhythm with no murmurs, gallops or rubs.  ABDOMEN:  Normal bowel sounds, soft, nontender. PEG c/d/i  SKIN:  No acute rashes. Line(s) are in place  without any surrounding erythema or exudate.   NEUROLOGIC:  Intubated and sedated.  PSYCH: Intubated and sedated.  CURRENT LINES: LUE PIV 12/17, PICC RUE 10/21, Gastrostomy tube 10/30/17, PIV RUE 12/19, CVC 12/19, 4 chest tubes, urethral catheter    NEW DATA/RESULTS:   All interval basic labs, microbiology results and imaging were reviewed.    Culture Micro   Date Value Ref Range Status   12/19/2017 Culture negative monitoring continues  Preliminary   12/19/2017 PENDING  Preliminary   12/19/2017 Culture negative monitoring continues  Preliminary   12/19/2017 Culture negative monitoring continues  Preliminary   12/19/2017 Culture negative monitoring continues  Preliminary   12/19/2017 PENDING  Preliminary       Recent Labs   Lab Test  12/20/17   0748  12/17/17   0730  10/23/17   0341  10/21/17   0335  10/16/17   0323  10/09/17   0316   CRP  87.0*  65.0*  30.0*  44.0*  48.0*  240.0*     Recent Labs   Lab Test  12/20/17   0412  12/20/17   0002  12/19/17   1806  12/19/17   0606  12/18/17   0359  12/17/17   2024   WBC  15.5*  16.3*  16.1*  11.2*  9.5  10.3     Recent Labs   Lab Test  12/20/17   0412  12/20/17   0002  12/19/17   1806  12/19/17   0606   CR  1.33*  1.22  1.16  1.38*   GFRESTIMATED  54*  60*  63  52*       Hematology Studies  Recent Labs   Lab Test  12/20/17   0412  12/20/17   0002  12/19/17   1806  12/19/17   1653   12/19/17   0606  12/18/17   0359  12/17/17   2024  12/17/17   0019  12/16/17   1935   10/06/17   1020   05/06/13   1703  12/04/12   1016  02/02/11   1506   WBC  15.5*  16.3*  16.1*   --    --   11.2*  9.5  10.3  11.2*  9.8   < >  17.1*   < >  10.6  11.9*  11.9*   ANEU   --    --    --    --    --    --    --    --   7.8  6.6   --   15.7*   --   7.2  7.9  8.2   AEOS   --    --    --    --    --    --    --    --   1.1*  1.0*   --   0.0   --   0.0  0.4  0.3   HCT  24.0*  21.4*  29.0*   --    --   34.0*  33.4*  32.7*  34.0*  31.5*   < >  43.8   < >  46.2  47.4  46.8   PLT  358  338  834 477   < >   438  423  428  420  391   < >  185   < >  315  259  315    < > = values in this interval not displayed.       Metabolic  Recent Labs   Lab Test  12/20/17   0412  12/20/17   0002  12/19/17   1806   NA  144  144  145*   BUN  13  11  10   CO2  25  26  29   CR  1.33*  1.22  1.16   GFRESTIMATED  54*  60*  63       Hepatic Studies  Recent Labs   Lab Test  12/18/17   0359  12/17/17   2024  12/17/17   0019   BILITOTAL  1.3  1.0  1.3   ALKPHOS  92  94  102   ALBUMIN  1.5*  1.6*  1.6*   AST  32  29  33   ALT  30  32  34       Immunologlobulins  Recent Labs   Lab Test  10/23/17   0341  10/21/17   0357  05/06/13   1703   SED  122*  108*  6

## 2017-12-20 NOTE — PLAN OF CARE
Problem: Patient Care Overview  Goal: Plan of Care/Patient Progress Review  SLP: Dysphagia therapy cancelled.  Pt not appropriate for dysphagia f/u; pt remains intubated at this time.  ST to f/u as medically appropriate.

## 2017-12-20 NOTE — PROGRESS NOTES
CLINICAL NUTRITION SERVICES - REASSESSMENT NOTE     Nutrition Prescription    RECOMMENDATIONS FOR MDs/PROVIDERS TO ORDER:  SLP tammy once extubated    Malnutrition Status:    Non-severe malnutrition in the context of chronic illness    Recommendations already ordered by Registered Dietitian (RD):  1. Begin Impact Peptide (immune-enhancing post-op) via PEG @ 15 mL/hr and adv 10 mL q8h to goal 65 ml/hr (1560 ml/day) to provide 2340 kcals (28 kcal/kg/day), 147 g PRO (1.8 g/kg/day), 1201 ml free H2O, 100 g Fat (50% from MCTs), 218 g CHO and no Fiber daily.  2. Free water flushes 30 mL q4h for patency.  3. Certavite to ensure adequate micronutrients in case of slow TF adv, EN interruptions, hypermetabolic needs.  4. Obtain baseline and weekly CRP while on Impact Peptide to assess ongoing approp of immune-modulating TF therapy vs ability to change to standard formula.  5. Ensure K+, Mg++, phos ordered daily while nutrition support advancing. Do not adv if K+, Mg++ < nrml or phos <2. Replace lytes per protocol.    Future/Additional Recommendations:  1. If suspect no longer needs immune-enhancing TF formula (pending CRP trends), change to TwoCal HN @ 50 mL/hr (1200 mL/day) to provide 2400 kcals (29 kcal/kg/day), 101 g PRO (1.2 g/kg/day), 840 mL H2O, 263 g CHO and 6 g Fiber daily. Add 1 pkt ProSource BID (80 kcal, 22 g PRO) for total 2480 kcal (30 kcal/kg) and 123 g PRO (1.5 g/kg).    2. If/when extubated and diet advanced, could consider cycle TF overnight to promote PO intake. Could start with Impact Peptide 1.5 @ 65 mL/hr x 12 hours to provide 50% of above provisions. Adjust regimen pending PO intakes so PO + TF = 100% of needs.    **Consulted to assess and order TF    EVALUATION OF THE PROGRESS TOWARD GOALS   Diet: NPO  Enteral Access: PEG  Nutrition Support: None yet.  Intake: NPO x 1-2 days. Ate 50% of a meal on 12/18 per I/O.      NEW FINDINGS   -Resp/CV: Intubated s/p redo-sternotomy aortic root and valve  homograft  -Wt: Lowest wt 78 kg on 12/18 - pt otherwise 83 kg on 12/16 and 84.6 kg on 12/20. Pt was net (-) 1L on 12/17 and (-) 147 mL on 12/18, so could have been some fluid loss, but unclear dry wt given variable trends. Will continue 83 kg dosing wt until more trends available  -GI: stooling - 2x BM yesterday, none so far today. Per RN, pt has been having diarrhea for while now (per family)  -Labs: K+, Mg++ and phos WNL    MALNUTRITION  % Intake: Decreased intake does not meet criteria  % Weight Loss: Difficult to assess given wt trends.   Subcutaneous Fat Loss: Upper arm: and Lower arm: mild  Muscle Loss: Temporal: Moderate. Upper arm (bicep, tricep), Lower arm  (forearm)  and Upper leg (quadricep, hamstring): Severe  Fluid Accumulation/Edema: Does not meet criteria  Malnutrition Diagnosis: Non-severe malnutrition in the context of chronic illness    Previous Goals   Patient to consume % of nutritionally adequate meal trays TID, or the equivalent with supplements/snacks.  Evaluation: Not met    Previous Nutrition Diagnosis  Predicted inadequate nutrient intake (kcal/PRO) related to recent reliance on TFs via PEG, recent dysphagia as evidenced by d/c'd home from rehab on PO diet only, unclear if PO intake meeting needs vs need for TFs via PEG this admission   Evaluation: Declining    CURRENT NUTRITION DIAGNOSIS  Inadequate protein-energy intake related to NPO status post-op as evidenced by no TFs yet started via PEG tube to provide any additional kcals/PRO this admission (NPO 1-2 days w/ prior unclear intakes, likely low d/t 50% of a meal recorded)    INTERVENTIONS  Implementation  Collaboration with other providers  Enteral Nutrition, Feeding tube flush, Multivitamin/mineral supplement therapy - Initiate    Goals  Total avg nutritional intake to meet a minimum of 25 kcal/kg and 1.2 g PRO/kg daily (per dosing wt 83 kg).    Monitoring/Evaluation  Progress toward goals will be monitored and evaluated per  protocol.    Maricel Childers RD, LD, [facility]  CVICU Dietitian  Pager: [phone]

## 2017-12-20 NOTE — PLAN OF CARE
Problem: Patient Care Overview  Goal: Plan of Care/Patient Progress Review  PT 4E: Cancel - Pt not medically appropriate for therapy this morning. Will re-schedule and initiate as indicated. Thank you for your referral.

## 2017-12-20 NOTE — PROCEDURES
SICU BEDSIDE PROCEDURE NOTE  NAME:  Cricket Miguel   MRN:  [id number]   :  1955     Diagnosis:  Respiratory failure    Procedure: Flexible bronchoscopy     Specimen: BAL sent    Premedication: Pt intubated and sedated prior to procedure  Procedure Meds: 1% topical lidocaine solution at the bart    Procedure: A timeout was called to review the case and patient information. The patient was positioned supine. The bronchoscope was passed into the distal trachea via ET tube without difficulty.  Bilateral tracheobronchial trees were inspected closely to the level of the subsegmental bronchi.  Secretions were found to be yellowish green. Minimal quantity. Not thick. Mostly located in the right lung. These were lavaged and evacuated without difficulty. The samples were sent for BAL. The procedure was completed and the patient tolerated the procedure well and without complications.      Findings: Minimal yellowish/green, thin secretions.    Plan: f/u cultures    Dr. Alford was available for assistance throughout the entire procedure.      Rea Boyce M.D.  CA-2/PGY-3  2017  12:34 PM

## 2017-12-21 ENCOUNTER — APPOINTMENT (OUTPATIENT)
Dept: OCCUPATIONAL THERAPY | Facility: CLINIC | Age: 62
DRG: 219 | End: 2017-12-21
Attending: INTERNAL MEDICINE
Payer: COMMERCIAL

## 2017-12-21 ENCOUNTER — APPOINTMENT (OUTPATIENT)
Dept: GENERAL RADIOLOGY | Facility: CLINIC | Age: 62
DRG: 219 | End: 2017-12-21
Attending: THORACIC SURGERY (CARDIOTHORACIC VASCULAR SURGERY)
Payer: COMMERCIAL

## 2017-12-21 LAB
ANION GAP SERPL CALCULATED.3IONS-SCNC: 7 MMOL/L (ref 3–14)
BASE EXCESS BLDA CALC-SCNC: 1.2 MMOL/L
BASE EXCESS BLDV CALC-SCNC: 1.7 MMOL/L
BASE EXCESS BLDV CALC-SCNC: 4.1 MMOL/L
BUN SERPL-MCNC: 16 MG/DL (ref 7–30)
CA-I BLD-MCNC: 4.7 MG/DL (ref 4.4–5.2)
CALCIUM SERPL-MCNC: 8 MG/DL (ref 8.5–10.1)
CHLORIDE SERPL-SCNC: 109 MMOL/L (ref 94–109)
CO2 SERPL-SCNC: 27 MMOL/L (ref 20–32)
CREAT SERPL-MCNC: 1.63 MG/DL (ref 0.66–1.25)
ERYTHROCYTE [DISTWIDTH] IN BLOOD BY AUTOMATED COUNT: 19 % (ref 10–15)
GFR SERPL CREATININE-BSD FRML MDRD: 43 ML/MIN/1.7M2
GLUCOSE BLDC GLUCOMTR-MCNC: 110 MG/DL (ref 70–99)
GLUCOSE BLDC GLUCOMTR-MCNC: 112 MG/DL (ref 70–99)
GLUCOSE BLDC GLUCOMTR-MCNC: 123 MG/DL (ref 70–99)
GLUCOSE BLDC GLUCOMTR-MCNC: 133 MG/DL (ref 70–99)
GLUCOSE BLDC GLUCOMTR-MCNC: 134 MG/DL (ref 70–99)
GLUCOSE BLDC GLUCOMTR-MCNC: 99 MG/DL (ref 70–99)
GLUCOSE SERPL-MCNC: 105 MG/DL (ref 70–99)
GRAM STN SPEC: NORMAL
GRAM STN SPEC: NORMAL
HCO3 BLD-SCNC: 26 MMOL/L (ref 21–28)
HCO3 BLDV-SCNC: 28 MMOL/L (ref 21–28)
HCO3 BLDV-SCNC: 29 MMOL/L (ref 21–28)
HCT VFR BLD AUTO: 25 % (ref 40–53)
HGB BLD-MCNC: 8.2 G/DL (ref 13.3–17.7)
INR PPP: 1.35 (ref 0.86–1.14)
INTERPRETATION ECG - MUSE: NORMAL
LACTATE BLD-SCNC: 0.7 MMOL/L (ref 0.7–2)
MAGNESIUM SERPL-MCNC: 2.3 MG/DL (ref 1.6–2.3)
MAGNESIUM SERPL-MCNC: 2.3 MG/DL (ref 1.6–2.3)
MCH RBC QN AUTO: 30.1 PG (ref 26.5–33)
MCHC RBC AUTO-ENTMCNC: 32.8 G/DL (ref 31.5–36.5)
MCV RBC AUTO: 92 FL (ref 78–100)
O2/TOTAL GAS SETTING VFR VENT: ABNORMAL %
O2/TOTAL GAS SETTING VFR VENT: ABNORMAL %
O2/TOTAL GAS SETTING VFR VENT: NORMAL %
OXYHGB MFR BLD: 97 % (ref 92–100)
OXYHGB MFR BLDV: 49 %
OXYHGB MFR BLDV: 55 %
PCO2 BLD: 42 MM HG (ref 35–45)
PCO2 BLDV: 48 MM HG (ref 40–50)
PCO2 BLDV: 49 MM HG (ref 40–50)
PH BLD: 7.4 PH (ref 7.35–7.45)
PH BLDV: 7.36 PH (ref 7.32–7.43)
PH BLDV: 7.4 PH (ref 7.32–7.43)
PHOSPHATE SERPL-MCNC: 3.9 MG/DL (ref 2.5–4.5)
PHOSPHATE SERPL-MCNC: 5.1 MG/DL (ref 2.5–4.5)
PLATELET # BLD AUTO: 250 10E9/L (ref 150–450)
PO2 BLD: 146 MM HG (ref 80–105)
PO2 BLDV: 28 MM HG (ref 25–47)
PO2 BLDV: 31 MM HG (ref 25–47)
POTASSIUM SERPL-SCNC: 3.6 MMOL/L (ref 3.4–5.3)
POTASSIUM SERPL-SCNC: 3.9 MMOL/L (ref 3.4–5.3)
RBC # BLD AUTO: 2.72 10E12/L (ref 4.4–5.9)
SODIUM SERPL-SCNC: 143 MMOL/L (ref 133–144)
SPECIMEN SOURCE: NORMAL
WBC # BLD AUTO: 13.4 10E9/L (ref 4–11)

## 2017-12-21 PROCEDURE — 25000128 H RX IP 250 OP 636: Performed by: STUDENT IN AN ORGANIZED HEALTH CARE EDUCATION/TRAINING PROGRAM

## 2017-12-21 PROCEDURE — 85610 PROTHROMBIN TIME: CPT | Performed by: PHYSICIAN ASSISTANT

## 2017-12-21 PROCEDURE — 84132 ASSAY OF SERUM POTASSIUM: CPT | Performed by: THORACIC SURGERY (CARDIOTHORACIC VASCULAR SURGERY)

## 2017-12-21 PROCEDURE — 25000128 H RX IP 250 OP 636: Performed by: THORACIC SURGERY (CARDIOTHORACIC VASCULAR SURGERY)

## 2017-12-21 PROCEDURE — 25000132 ZZH RX MED GY IP 250 OP 250 PS 637: Performed by: PHYSICIAN ASSISTANT

## 2017-12-21 PROCEDURE — 97530 THERAPEUTIC ACTIVITIES: CPT | Mod: GO | Performed by: OCCUPATIONAL THERAPIST

## 2017-12-21 PROCEDURE — 40000133 ZZH STATISTIC OT WARD VISIT: Performed by: OCCUPATIONAL THERAPIST

## 2017-12-21 PROCEDURE — 84100 ASSAY OF PHOSPHORUS: CPT | Performed by: PHYSICIAN ASSISTANT

## 2017-12-21 PROCEDURE — 40000014 ZZH STATISTIC ARTERIAL MONITORING DAILY

## 2017-12-21 PROCEDURE — 40000048 ZZH STATISTIC DAILY SWAN MONITORING

## 2017-12-21 PROCEDURE — 83605 ASSAY OF LACTIC ACID: CPT | Performed by: THORACIC SURGERY (CARDIOTHORACIC VASCULAR SURGERY)

## 2017-12-21 PROCEDURE — 25800025 ZZH RX 258: Performed by: THORACIC SURGERY (CARDIOTHORACIC VASCULAR SURGERY)

## 2017-12-21 PROCEDURE — 25000125 ZZHC RX 250: Performed by: THORACIC SURGERY (CARDIOTHORACIC VASCULAR SURGERY)

## 2017-12-21 PROCEDURE — 80048 BASIC METABOLIC PNL TOTAL CA: CPT | Performed by: PHYSICIAN ASSISTANT

## 2017-12-21 PROCEDURE — 83735 ASSAY OF MAGNESIUM: CPT | Performed by: PHYSICIAN ASSISTANT

## 2017-12-21 PROCEDURE — 83735 ASSAY OF MAGNESIUM: CPT | Performed by: THORACIC SURGERY (CARDIOTHORACIC VASCULAR SURGERY)

## 2017-12-21 PROCEDURE — 00000146 ZZHCL STATISTIC GLUCOSE BY METER IP

## 2017-12-21 PROCEDURE — 99233 SBSQ HOSP IP/OBS HIGH 50: CPT | Mod: GC | Performed by: INTERNAL MEDICINE

## 2017-12-21 PROCEDURE — 93005 ELECTROCARDIOGRAM TRACING: CPT

## 2017-12-21 PROCEDURE — 82330 ASSAY OF CALCIUM: CPT | Performed by: THORACIC SURGERY (CARDIOTHORACIC VASCULAR SURGERY)

## 2017-12-21 PROCEDURE — 25000132 ZZH RX MED GY IP 250 OP 250 PS 637: Performed by: HOSPITALIST

## 2017-12-21 PROCEDURE — 94640 AIRWAY INHALATION TREATMENT: CPT | Mod: 76

## 2017-12-21 PROCEDURE — 40000196 ZZH STATISTIC RAPCV CVP MONITORING

## 2017-12-21 PROCEDURE — 40000275 ZZH STATISTIC RCP TIME EA 10 MIN

## 2017-12-21 PROCEDURE — 71010 XR CHEST PORT 1 VW: CPT

## 2017-12-21 PROCEDURE — 25000132 ZZH RX MED GY IP 250 OP 250 PS 637: Performed by: STUDENT IN AN ORGANIZED HEALTH CARE EDUCATION/TRAINING PROGRAM

## 2017-12-21 PROCEDURE — 25000128 H RX IP 250 OP 636: Performed by: PHYSICIAN ASSISTANT

## 2017-12-21 PROCEDURE — 20000004 ZZH R&B ICU UMMC

## 2017-12-21 PROCEDURE — 94640 AIRWAY INHALATION TREATMENT: CPT

## 2017-12-21 PROCEDURE — 84100 ASSAY OF PHOSPHORUS: CPT | Performed by: THORACIC SURGERY (CARDIOTHORACIC VASCULAR SURGERY)

## 2017-12-21 PROCEDURE — 25000132 ZZH RX MED GY IP 250 OP 250 PS 637: Performed by: THORACIC SURGERY (CARDIOTHORACIC VASCULAR SURGERY)

## 2017-12-21 PROCEDURE — 87205 SMEAR GRAM STAIN: CPT | Performed by: SURGERY

## 2017-12-21 PROCEDURE — 25000128 H RX IP 250 OP 636

## 2017-12-21 PROCEDURE — 25000125 ZZHC RX 250: Performed by: NEUROLOGICAL SURGERY

## 2017-12-21 PROCEDURE — 87070 CULTURE OTHR SPECIMN AEROBIC: CPT | Performed by: SURGERY

## 2017-12-21 PROCEDURE — 25000132 ZZH RX MED GY IP 250 OP 250 PS 637

## 2017-12-21 PROCEDURE — 99291 CRITICAL CARE FIRST HOUR: CPT | Mod: GC | Performed by: ANESTHESIOLOGY

## 2017-12-21 PROCEDURE — 99232 SBSQ HOSP IP/OBS MODERATE 35: CPT | Performed by: PSYCHIATRY & NEUROLOGY

## 2017-12-21 PROCEDURE — 82805 BLOOD GASES W/O2 SATURATION: CPT | Performed by: THORACIC SURGERY (CARDIOTHORACIC VASCULAR SURGERY)

## 2017-12-21 PROCEDURE — 25000125 ZZHC RX 250: Performed by: SURGERY

## 2017-12-21 PROCEDURE — 25000132 ZZH RX MED GY IP 250 OP 250 PS 637: Performed by: INTERNAL MEDICINE

## 2017-12-21 PROCEDURE — 27210437 ZZH NUTRITION PRODUCT SEMIELEM INTERMED LITER

## 2017-12-21 PROCEDURE — 93010 ELECTROCARDIOGRAM REPORT: CPT | Performed by: INTERNAL MEDICINE

## 2017-12-21 PROCEDURE — 87077 CULTURE AEROBIC IDENTIFY: CPT | Performed by: SURGERY

## 2017-12-21 PROCEDURE — 85027 COMPLETE CBC AUTOMATED: CPT | Performed by: PHYSICIAN ASSISTANT

## 2017-12-21 RX ORDER — OXYCODONE HYDROCHLORIDE 5 MG/1
5 TABLET ORAL EVERY 4 HOURS PRN
Status: DISCONTINUED | OUTPATIENT
Start: 2017-12-21 | End: 2018-01-04 | Stop reason: HOSPADM

## 2017-12-21 RX ORDER — OLANZAPINE 5 MG/1
5 TABLET ORAL DAILY PRN
Status: DISCONTINUED | OUTPATIENT
Start: 2017-12-21 | End: 2017-12-28

## 2017-12-21 RX ORDER — LORAZEPAM 2 MG/ML
0.25 INJECTION INTRAMUSCULAR EVERY 4 HOURS PRN
Status: DISCONTINUED | OUTPATIENT
Start: 2017-12-21 | End: 2017-12-21

## 2017-12-21 RX ORDER — POLYETHYLENE GLYCOL 3350 17 G/17G
17 POWDER, FOR SOLUTION ORAL DAILY
Status: DISCONTINUED | OUTPATIENT
Start: 2017-12-21 | End: 2018-01-04 | Stop reason: HOSPADM

## 2017-12-21 RX ORDER — FUROSEMIDE 10 MG/ML
40 INJECTION INTRAMUSCULAR; INTRAVENOUS ONCE
Status: COMPLETED | OUTPATIENT
Start: 2017-12-21 | End: 2017-12-21

## 2017-12-21 RX ORDER — OLANZAPINE 5 MG/1
5 TABLET ORAL
Status: DISCONTINUED | OUTPATIENT
Start: 2017-12-21 | End: 2017-12-22

## 2017-12-21 RX ORDER — QUETIAPINE FUMARATE 25 MG/1
25 TABLET, FILM COATED ORAL 2 TIMES DAILY
Status: DISCONTINUED | OUTPATIENT
Start: 2017-12-21 | End: 2017-12-21

## 2017-12-21 RX ADMIN — ATORVASTATIN CALCIUM 40 MG: 40 TABLET, FILM COATED ORAL at 08:40

## 2017-12-21 RX ADMIN — HYDROMORPHONE HYDROCHLORIDE 0.2 MG: 1 INJECTION, SOLUTION INTRAMUSCULAR; INTRAVENOUS; SUBCUTANEOUS at 04:40

## 2017-12-21 RX ADMIN — Medication 300 MG: at 21:00

## 2017-12-21 RX ADMIN — ALBUTEROL SULFATE 2.5 MG: 2.5 SOLUTION RESPIRATORY (INHALATION) at 03:53

## 2017-12-21 RX ADMIN — ACETAMINOPHEN 975 MG: 325 TABLET, FILM COATED ORAL at 02:41

## 2017-12-21 RX ADMIN — PIPERACILLIN SODIUM AND TAZOBACTAM SODIUM 4.5 G: 36; 4.5 INJECTION, POWDER, LYOPHILIZED, FOR SOLUTION INTRAVENOUS at 13:38

## 2017-12-21 RX ADMIN — HEPARIN SODIUM 5000 UNITS: 5000 INJECTION, SOLUTION INTRAVENOUS; SUBCUTANEOUS at 10:04

## 2017-12-21 RX ADMIN — OXYCODONE HYDROCHLORIDE 10 MG: 5 TABLET ORAL at 08:40

## 2017-12-21 RX ADMIN — HEPARIN SODIUM 5000 UNITS: 5000 INJECTION, SOLUTION INTRAVENOUS; SUBCUTANEOUS at 18:28

## 2017-12-21 RX ADMIN — EPINEPHRINE 0.02 MCG/KG/MIN: 1 INJECTION PARENTERAL at 10:06

## 2017-12-21 RX ADMIN — HYDROMORPHONE HYDROCHLORIDE 0.2 MG: 1 INJECTION, SOLUTION INTRAMUSCULAR; INTRAVENOUS; SUBCUTANEOUS at 00:09

## 2017-12-21 RX ADMIN — VENLAFAXINE 50 MG: 100 TABLET ORAL at 08:46

## 2017-12-21 RX ADMIN — POTASSIUM CHLORIDE, DEXTROSE MONOHYDRATE AND SODIUM CHLORIDE: 150; 5; 450 INJECTION, SOLUTION INTRAVENOUS at 20:18

## 2017-12-21 RX ADMIN — LORAZEPAM 0.5 MG: 2 INJECTION INTRAMUSCULAR; INTRAVENOUS at 06:50

## 2017-12-21 RX ADMIN — ACETAMINOPHEN 975 MG: 325 TABLET, FILM COATED ORAL at 18:28

## 2017-12-21 RX ADMIN — OXYCODONE HYDROCHLORIDE 5 MG: 5 TABLET ORAL at 20:13

## 2017-12-21 RX ADMIN — POTASSIUM CHLORIDE 20 MEQ: 1.5 POWDER, FOR SOLUTION ORAL at 10:41

## 2017-12-21 RX ADMIN — KETOTIFEN FUMARATE 1 DROP: 0.25 SOLUTION/ DROPS OPHTHALMIC at 16:25

## 2017-12-21 RX ADMIN — POLYETHYLENE GLYCOL 3350 17 G: 17 POWDER, FOR SOLUTION ORAL at 10:04

## 2017-12-21 RX ADMIN — ASPIRIN 81 MG CHEWABLE TABLET 81 MG: 81 TABLET CHEWABLE at 08:40

## 2017-12-21 RX ADMIN — PIPERACILLIN SODIUM AND TAZOBACTAM SODIUM 4.5 G: 36; 4.5 INJECTION, POWDER, LYOPHILIZED, FOR SOLUTION INTRAVENOUS at 10:05

## 2017-12-21 RX ADMIN — CARBOXYMETHYLCELLULOSE SODIUM 1 DROP: 5 SOLUTION/ DROPS OPHTHALMIC at 08:48

## 2017-12-21 RX ADMIN — OLANZAPINE 5 MG: 5 TABLET, FILM COATED ORAL at 20:13

## 2017-12-21 RX ADMIN — PIPERACILLIN SODIUM AND TAZOBACTAM SODIUM 4.5 G: 36; 4.5 INJECTION, POWDER, LYOPHILIZED, FOR SOLUTION INTRAVENOUS at 02:37

## 2017-12-21 RX ADMIN — Medication 300 MG: at 08:46

## 2017-12-21 RX ADMIN — QUETIAPINE FUMARATE 25 MG: 25 TABLET ORAL at 12:26

## 2017-12-21 RX ADMIN — ACETAMINOPHEN 975 MG: 325 TABLET, FILM COATED ORAL at 08:46

## 2017-12-21 RX ADMIN — EPINEPHRINE 0.02 MCG/KG/MIN: 1 INJECTION PARENTERAL at 12:23

## 2017-12-21 RX ADMIN — DEXMEDETOMIDINE HYDROCHLORIDE 0.7 MCG/KG/HR: 4 INJECTION, SOLUTION INTRAVENOUS at 03:04

## 2017-12-21 RX ADMIN — LORAZEPAM 0.5 MG: 2 INJECTION INTRAMUSCULAR; INTRAVENOUS at 03:03

## 2017-12-21 RX ADMIN — SENNOSIDES AND DOCUSATE SODIUM 2 TABLET: 8.6; 5 TABLET ORAL at 20:00

## 2017-12-21 RX ADMIN — OLANZAPINE 5 MG: 5 TABLET, FILM COATED ORAL at 23:55

## 2017-12-21 RX ADMIN — PIPERACILLIN SODIUM AND TAZOBACTAM SODIUM 4.5 G: 36; 4.5 INJECTION, POWDER, LYOPHILIZED, FOR SOLUTION INTRAVENOUS at 21:00

## 2017-12-21 RX ADMIN — KETOTIFEN FUMARATE 1 DROP: 0.25 SOLUTION/ DROPS OPHTHALMIC at 08:43

## 2017-12-21 RX ADMIN — ALBUTEROL SULFATE 2.5 MG: 2.5 SOLUTION RESPIRATORY (INHALATION) at 08:41

## 2017-12-21 RX ADMIN — SENNOSIDES AND DOCUSATE SODIUM 2 TABLET: 8.6; 5 TABLET ORAL at 08:40

## 2017-12-21 RX ADMIN — MIRTAZAPINE 30 MG: 15 TABLET, FILM COATED ORAL at 21:28

## 2017-12-21 RX ADMIN — DEXMEDETOMIDINE HYDROCHLORIDE 0.7 MCG/KG/HR: 4 INJECTION, SOLUTION INTRAVENOUS at 20:18

## 2017-12-21 RX ADMIN — FAMOTIDINE 20 MG: 10 INJECTION, SOLUTION INTRAVENOUS at 08:40

## 2017-12-21 RX ADMIN — OXYCODONE HYDROCHLORIDE 5 MG: 5 TABLET ORAL at 14:50

## 2017-12-21 RX ADMIN — FUROSEMIDE 40 MG: 10 INJECTION, SOLUTION INTRAVENOUS at 10:04

## 2017-12-21 RX ADMIN — POTASSIUM CHLORIDE 20 MEQ: 1.5 POWDER, FOR SOLUTION ORAL at 23:54

## 2017-12-21 RX ADMIN — ALBUTEROL SULFATE 2.5 MG: 2.5 SOLUTION RESPIRATORY (INHALATION) at 13:10

## 2017-12-21 RX ADMIN — VENLAFAXINE 50 MG: 100 TABLET ORAL at 21:00

## 2017-12-21 RX ADMIN — DEXMEDETOMIDINE HYDROCHLORIDE 0.7 MCG/KG/HR: 4 INJECTION, SOLUTION INTRAVENOUS at 10:41

## 2017-12-21 RX ADMIN — MULTIVITAMIN 15 ML: LIQUID ORAL at 08:44

## 2017-12-21 RX ADMIN — OLANZAPINE 5 MG: 5 TABLET, FILM COATED ORAL at 14:52

## 2017-12-21 RX ADMIN — HEPARIN SODIUM 5000 UNITS: 5000 INJECTION, SOLUTION INTRAVENOUS; SUBCUTANEOUS at 02:37

## 2017-12-21 NOTE — CONSULTS
Cardiology Inpatient Consultation  December 21, 2017    Reason for Consult:  A cardiology consult was requested by Dr. Alford from the CV ICU service to provide clinical guidance regarding new onset reduced EF.    HPI:   Cricket Miguel is a 62 year old male admitted with worsening dyspnea, thought to have acute severe AI on echocardiogram.  Pertinent history of aortic stenosis and ascending aortic aneurysm s/p AVR and root replacement April 2016.  Had complications of moises aortic abscess, endocarditis, and septic emboli to his brain.  He underwent redo sternotomy with aortic root and valve homograft 12/19.  Heart failure service is being consulted for newly reduced EF 20-25%.    Patient received 26 mm x 7 cm cryolife aortic valve and conduit with Dr. Bowers 12/19.  Prior valve was trifecta valve with 30mm graft.  No complications.  Currently on Epinephrine 0.01.  His prior to admission HCTZ, labetalol.      Every prior echo shows EF 55-65 until 12/17 when EF was 30-35%.  Echo post procedure 12/20 EF biplane traced at 23%.      Patient is extubated.  Does have swan in.  Has been agitated and delirious since extubation.      Discussed with wife and patient.  Currently patient has no physical complaints and states he wants to leave but he is also delirious.    PMH:  Past Medical History:   Diagnosis Date     Abscess of aortic root 09/2017     AI (aortic insufficiency)     severe     Anxiety      Aortic root dilatation (H)      AR (aortic regurgitation)     severe     Cardiomyopathy (H)      CHF (congestive heart failure), NYHA class II (H)      COPD, mild (H)     spirometry 12/16     CVA (cerebral vascular accident) (H) 09/2017    septic emboli - MSSA - cerebellum, Left MCA, Rt Occipital, Aphasia, Left visual field cut, moderate to severe encephalopathy     Depression 09/2017     Heart murmur      Hyperlipidemia LDL goal <70 10/31/2010     Hypertension goal BP (blood pressure) < 140/90      Inguinal hernia       left lung nodule  1/29/2008     Major depressive disorder, single episode, moderate (H)      Psoriasis childhood     Tobacco use disorder      Active Problems:  Patient Active Problem List    Diagnosis Date Noted     Hypertension goal BP (blood pressure) < 140/90 01/29/2008     Priority: High     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 12/19/2017     Priority: Medium     Heart failure (H) 12/16/2017     Priority: Medium     Anxiety      Priority: Medium     Encephalopathy 10/17/2017     Priority: Medium     Endocarditis 10/06/2017     Priority: Medium     Abscess of aortic root 09/01/2017     Priority: Medium     CVA (cerebral vascular accident) (H) 09/01/2017     Priority: Medium     septic emboli       Depression 09/01/2017     Priority: Medium     S/P AVR (aortic valve replacement) 05/23/2016     Priority: Medium     H/O aortic root repair 05/23/2016     Priority: Medium     Anemia 05/23/2016     Priority: Medium     Cardiomyopathy (H)      Priority: Medium     Status post coronary angiogram 05/06/2016     Priority: Medium     Health Care Home 05/02/2016     Priority: Medium       Status:  Accepted  Care Coordinator:  Jane Trivedi    See Letters for [facility] Care Plan  Date:  May 2, 2016           COPD, mild (H)      Priority: Medium     CHF (congestive heart failure), NYHA class II (H)      Priority: Medium     AR (aortic regurgitation)      Priority: Medium     AI (aortic insufficiency)      Priority: Medium     Aortic root dilatation (H)      Priority: Medium     Hyperlipidemia LDL goal <70 10/31/2010     Priority: Medium     Major depressive disorder, single episode, moderate (HCC)      Priority: Medium     Disease of lung 01/29/2008     Priority: Medium     Problem list name updated by automated process. Provider to review       Tobacco use disorder 05/25/2006     Priority: Medium     Psoriasis      Priority: Low     Advance Care Planning 02/07/2012     Priority: Low      Advance Care Planning 5/24/2016: ACP Review of Chart / Resources Provided:  Reviewed chart for advance care plan.  Cricket Miguel has an advance care plan on file which needs to be updated. Only code status on file. Suggest information and resources for ACP be provided at next opportunity.  Added by Lori Colbert RN, System Director ACP-Honoring Choices   Advance Care Planning 2/16/12 Left message for patient to call back to arrange facilitation if desired for completion of Advanced Care Directive. Kristal Reaves LPN/Advanced Healthcare Planning Facilitator  Advance Care Planning 2/7/12 Discussed advance care planning with patient; information given to patient to review. Sherif Sparrow CMA           Social History:  Social History   Substance Use Topics     Smoking status: Former Smoker     Packs/day: 1.00     Years: 35.00     Quit date: 4/1/2016     Smokeless tobacco: Never Used      Comment: 4/2016     Alcohol use 0.0 oz/week     0 Standard drinks or equivalent per week      Comment: 1 drink per week avg, seldom     Family History:  Family History   Problem Relation Age of Onset     Genetic Disorder Mother      Bone plateover growth Agustin. shoulder surgeries     CANCER Mother      brain cancer     Alzheimer Disease Father      Medications:    polyethylene glycol  17 g Oral Daily     [START ON 12/22/2017] famotidine  20 mg Intravenous Q24H     OLANZapine  5 mg Oral BID BMT CSA     heparin  5,000 Units Subcutaneous Q8H     venlafaxine  50 mg Oral BID     multivitamins with minerals  15 mL Per Feeding Tube Daily     piperacillin-tazobactam  4.5 g Intravenous Q6H     sodium chloride (PF)  3 mL Intracatheter Q8H     insulin aspart   Subcutaneous TID w/meals     acetaminophen  975 mg Oral Q8H     senna-docusate  1-2 tablet Oral BID     mupirocin  0.5 g Both Nostrils BID     acetylcysteine  2 mL Nebulization Q4H     mirtazapine  30 mg Oral At Bedtime     rifampin  300 mg Oral or Feeding Tube BID     potassium chloride  40  mEq Oral Once     potassium chloride  40 mEq Oral Once     ketotifen  1 drop Left Eye TID     artificial tears   Ophthalmic At Bedtime     aspirin  81 mg Oral or Feeding Tube Daily     atorvastatin  40 mg Oral Daily         norepinephrine Stopped (12/20/17 0805)     IV fluid REPLACEMENT ONLY       IV fluid REPLACEMENT ONLY       insulin (regular) Stopped (12/21/17 0100)     Reason beta blocker order not selected       dextrose 5% and 0.45% NaCl + KCl 20 mEq/L 5 mL/hr at 12/21/17 1200     EPINEPHrine IV infusion ADULT Stopped (12/21/17 1630)     dexmedetomidine 0.5 mcg/kg/hr (12/21/17 1600)     - MEDICATION INSTRUCTIONS -       Physical Exam:  Temp:  [99  F (37.2  C)-101  F (38.3  C)] 99.1  F (37.3  C)  Heart Rate:  [61-93] 83  Resp:  [12-32] 16  MAP:  [52 mmHg-109 mmHg] 101 mmHg  Arterial Line BP: ()/(43-86) 151/83  SpO2:  [95 %-100 %] 96 %    [place] numbers reviewed    Intake/Output Summary (Last 24 hours) at 12/21/17 1706  Last data filed at 12/21/17 1600   Gross per 24 hour   Intake          1975.41 ml   Output             2935 ml   Net          -959.59 ml     GEN: pleasant, no acute distress  HEENT: no icterus  CV: RRR, normal s1/s2, no murmurs/rubs/s3/s4, no heave. JVP at 8-9 cm.   CHEST: on 3L NC, normal work of breath, clear to ausculation bilaterally, no rales or wheezing  ABD: soft, non-tender, normal active bowel sounds  EXTR: pulses 2+ and equal. No clubbing, cyanosis or edema.   NEURO: alert oriented, speech fluent/appropriate, motor grossly nonfocal    Diagnostics:  All labs and imaging were reviewed    EKG: low voltage, sinus rhythm    Transthoracic echocardiogram:   Left ventricular wall thickness is normal. Mild left ventricular dilation is present. The Ejection Fraction is estimated at 30-35%. Diastolic function not assessed due to tachycardia. Severe diffuse hypokinesis is present.     The right ventricle is normal size. Global right ventricular function is mildly reduced.  Both atria appear  normal.  Trace mitral insufficiency is present.  The tricuspid valve is normal.     Vessels  The patient is post bio-Bentall - aortic root replacement with a 30 mm graft  and AVR with a Trifecta valve. Today, there is circumferential free space  noted surrounding the aortic graft, dehiscence and rocking of the graft from  the native aorta and severe AI in the space surrounding the aortic graft  (perivalvular/perigraft). The circumferential free space was present on the  previous studies of October 2017 but was filled with echodense material and  the graft was not independently mobile, and there was no significant AI. The  prosthetic aortic valve leaflets are not well visualized today.     No pericardial effusion is present.     Compared to Previous Study  This study was compared with the study from 10/06/2017 . Since the prior  study, the LV function has worsened and the aortic graft appears to be  dehisced, as described above.      Assessment and Recommendation:  Cricket Miguel is a 62 year old male admitted with worsening dyspnea, thought to have acute severe AI on echocardiogram.  Pertinent history of aortic stenosis and ascending aortic aneurysm s/p AVR and root replacement April 2016.  Had complications of moises aortic abscess, endocarditis, and septic emboli to his brain.  He underwent redo sternotomy with aortic root and valve homograft 12/19.  Heart failure service is being consulted for newly reduced EF 20-25%.    Would check troponin.  Coronaries were altered during graft replacement, want to make sure patient isnt having ischemia.  Based on swan numbers CO 4.1, CI 2.0.  SVR appears to be around 1300.  PCWP 12 when I wedged him.  This likely is from the severe AI and recent surgery.  I imagine his EF should improve over time if he is able to maintain euvolemia and be on evidence based therapies.  I would not start them yet with his resolving shock.  I would hold off further diuresis for now as his exam  PCWP indicate he relatively euvolemic, and furthermore his Cr. Is increasing.      I have discussed the above with Dr. Browne.    Thank you for consulting the cardiovascular services at the [facility]. Please do not hesitate to call us with any questions.     Pardeep Cruz MD PGY4  Cardiology Fellow  [phone]      I have reviewed today's vital signs, notes, medications, labs and imaging. I have also seen and examined the patient and agree with the findings and plan as outlined above.    Shakila Browne MD  Section Head - Advanced Heart Failure, Transplantation and Mechanical Circulatory Support  Co-Director - Adult [facility]  Associate Professor of Medicine, [facility]

## 2017-12-21 NOTE — PHARMACY-VANCOMYCIN DOSING SERVICE
Pharmacy Empiric Dose Change Per Policy - Vancomycin  Original Dose Ordered: 1500mg Vancomycin IV q12h  Dose Changed To: 1750mg (~20mg/kg) Vancomycin IV q24h  This dose change was based on the pharmacist's assessment of this patient's age, weight, concurrent drug therapy, treatment goals, whether patient's creatinine clearance adequately indicates renal function (factoring in age, muscle mass, fluid and clinical status), and, if applicable, prior pharmacokinetic data.    Creatinine ~1.1mg/dL at admission, has increased to 1.63mg/dL    Estimated Creatinine Clearance: 48.5 mL/min (based on Cr of 1.63).  Will continue to follow and modify dosage according to levels, organ function and clinical condition    Deisy Lee, PharmD

## 2017-12-21 NOTE — PLAN OF CARE
Problem: Patient Care Overview  Goal: Plan of Care/Patient Progress Review    SLP 4E: Cancel. Per discussion with RN, pt not following commands and somewhat agitated this date. Given current mental status, pt not appropriate to participate in skilled dysphagia tx. SLP to follow

## 2017-12-21 NOTE — PLAN OF CARE
Problem: Patient Care Overview  Goal: Individualization & Mutuality  Outcome: Declining  Blood pressure labile throughout shift, epinephrine weaned as tolerated.  Bronch completed in am hours. Weaned from sedation and extubated. Remains agitated and aggressive post extubation. Meghna called at patients request.

## 2017-12-21 NOTE — PROGRESS NOTES
CARDIOTHORACIC SURGERY PROGRESS NOTE: 12/21/2017    ASSESSMENT: Cricket Miguel is a 62 year old male with hx of COPD, HTN, HLD, CAD, HFpEF, aortic stenosis and ascending aortic aneurism s/p aortic valve replacement and aortic root replacement (April 2016) c/b moises-aortic abscess, endocarditis, cerebral septic emboli and severe AI s/p redo-sternotomy aortic root and valve homograft 12/19.       PLAN:   Neuro/ pain/ sedation: Hx of multifocal acute infarctions involving the left parietotemporal lobes, left cerebral hemisphere and right occipital lobe presumed to be septic emboli. Depression, mild delirium, followed by psych.  -Monitor neurological status. Notify the MD for any acute changes in exam.  -fentanyl gtt for pain.  -precedex/propofol for sedation.  psych recommendations:   1.  He may discontinue the Abilify.   2.  Drop the Effexor to 50 mg twice a day for 3 days and then discontinue.   3.  Increase Remeron to 30 mg at bedtime.   4.  It is okay to continue the Ativan 0.5- 1.0 mg q.6 h. IV p.r.n. anxiety.   5.  Seroquel 25 mg q.i.d. p.r.n. anxiety and agitation.   6.  Haldol 1-2 mg IV q.4h. for severe agitation.   -will reconsult psych for recommendations on delirium management  -repeat EKG for qTC, possible seroquel/Haldol     Pulmonary care:   -Extubated 12/20  -Encourage pulmonary toilet  -Facemask, PRN oxygen as needed     Cardiovascular:  HTN, HLD, HFpEF, CAD, AS and aortic aneurism s/p bental c/b endocarditis and severe AI s/p aortic root/valve homograft 12/19.  -Epinephrine to maintain MAP>65  -Keep SBP<120  -holding pta HCTZ, labetalol  -restarted ASA 81mg and atorvastatin.  -Repeat Echo shows EF 20-25% baseline 30%  -Remove mediastinal chest tubes today     GI care: OGT  -OGT to LIS  -famotidine  -senna/colace/miralax     Fluids/ Electrolytes/ Nutrition:   -PRN electrolyte replacement  -No indication for parenteral nutrition.  -Lasix 40 mg     Renal/ Fluid Balance: Urine output is adequate so  far.  -Garza catheter for strict intake and output.  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 Hx of MSSA septic arthritis and bacteremia. Hx of multifocal acute infarctions involving the left parietotemporal lobes, left cerebral hemisphere and right occipital lobe presumed to be septic emboli. ID following.   -per ID, continue Zosyn, stop vancomycin, continue rifampin  -Repeat blood cultures if febrile  -gram stain from sputum with gram negative rods  -F/u surgical tissue cultures (NGTD)     Heme: acute blood loss anemia  - CBC stable     Prophylaxis:    -Mechanical prophylaxis for DVT.   -SQ heparin.      Lines/ tubes/ drains:  -R IJI CVC, L radial arterial line, R triple lumen PICC, garza, OGT, PIVs     Disposition:  -CVICU.     Patient seen, findings and plan discussed with CVTS fellow Dr. Ma.    Christian Rodriguez MD  PGY-3, General Surgery      - - - - - - - - - - - - - - - - - - - - - - - - - - - - - - - - - - - - - - - - - - - - - - - - - - - - - - - - - - - - - - - - - - - - - - - -     OVERNIGHT EVENTS: extubated overnight, having some issues with pulmonary toilet and secretion management due to delirium    PRIMARY TEAM: CVTS  PRIMARY PHYSICIAN: Jero    REASON FOR CRITICAL CARE ADMISSION: s/p aortic root valve homograft   ADMITTING PHYSICIAN: Jero    HISTORY PRESENTING ILLNESS: Cricket Miguel is a 62 year old male with hx of COPD, HTN, HLD, CAD, HFpEF, aortic stenosis and ascending aortic aneurism s/p aortic valve replacement and aortic root replacement (April 2016) c/b moises-aortic abscess, endocarditis, cerebral septic emboli and severe AI s/p redo-sternotomy aortic root and valve homograft 12/19.     PHYSICAL EXAMINATION:  Temp:  [99  F (37.2  C)-101.1  F (38.4  C)] 99.1  F (37.3  C)  Heart Rate:  [61-84] 81  Resp:  [12-32] 16  MAP:  [52 mmHg-106 mmHg] 89 mmHg  Arterial Line BP: ()/(43-81) 134/72  SpO2:   [95 %-100 %] 100 %  General: delirious, does not respond appropriately to questions  HEENT: Normocephalic, atraumatic.    Chest wall: Symmetric thorax. Median sternotomy dressing c/d/i.   Respiratory: Equal BS bilateraly. Intubated and mechanically ventilated.  Cardiovascular: RRR, chest tubes draining serosanguinous fluid. Cool extremities  Gastrointestinal: Abdomen soft, non-distended.  Genitourinary: Espinoza catheter in place.   Skin: As noted above. No rashes or lesions appreciated.   Lines: R IJ CVC, L radial arterial line, OGT. R PICC      Recent Labs  Lab 12/21/17  0154 12/20/17  2056 12/20/17  1730 12/20/17  0748   PH 7.40 7.41 7.44 7.43   PCO2 42 40 34* 37   PO2 146* 160* 107* 281*   HCO3 26 25 23 25     Complete Blood Count     Recent Labs  Lab 12/21/17  0154 12/20/17  1416 12/20/17  0748 12/20/17  0412 12/20/17  0002 12/19/17  1806   WBC 13.4*  --   --  15.5* 16.3* 16.1*   HGB 8.2* 8.2* 8.1* 8.0* 7.0* 9.4*     --   --  358 338 303     Basic Metabolic Panel    Recent Labs  Lab 12/21/17  0154 12/20/17  0748 12/20/17  0412 12/20/17  0002 12/19/17  1806     --  144 144 145*   POTASSIUM 3.9 4.3 4.2 3.8 3.4   CHLORIDE 109  --  109 109 109   CO2 27  --  25 26 29   BUN 16  --  13 11 10   CR 1.63*  --  1.33* 1.22 1.16   *  --  160* 179* 142*     Liver Function Tests    Recent Labs  Lab 12/21/17  0154 12/20/17  0748 12/19/17  1806 12/19/17  1653  12/18/17  0359 12/17/17 2024 12/17/17  0019   AST  --   --   --   --   --  32 29 33   ALT  --   --   --   --   --  30 32 34   ALKPHOS  --   --   --   --   --  92 94 102   BILITOTAL  --   --   --   --   --  1.3 1.0 1.3   ALBUMIN  --   --   --   --   --  1.5* 1.6* 1.6*   INR 1.35* 1.29* 0.90 0.81*  < > 1.12 1.10 1.06   < > = values in this interval not displayed.  Pancreatic Enzymes  No lab results found in last 7 days.  Coagulation Profile    Recent Labs  Lab 12/21/17  0154 12/20/17  0748 12/19/17  1806 12/19/17  1653 12/19/17  1515   INR 1.35* 1.29*  0.90 0.81* 1.56*   PTT  --  39* 52* 43* 37     Recent Results (from the past 24 hour(s))   XR Chest Port 1 View    Narrative    EXAM: XR CHEST PORT 1 VW  12/21/2017 2:15 AM      HISTORY: Norman;     COMPARISON: Chest radiograph 12/20/2017, 12/19/2017, 12/16/2017, CT  12/17/2017    FINDINGS:     Single AP view of the chest. Endotracheal tube removed. Intact  mediastinotomy wires. Right IJ Norman-Amor catheter catheter tip  projects in the main pulmonary artery. Bilateral chest tubes.  Mediastinal drain. Mediastinal surgical clips.  Right arm PICC line.    The cardiomediastinal silhouette is within normal limits. Increased  diffuse pulmonary opacities.     New bilateral small pleural effusions. No appreciable  pneumomediastinum/left pneumothorax.      Impression    IMPRESSION:   Postoperative chest:  1. Endotracheal tube removed. Right IJ Norman-Amor catheter catheter tip  projects in the main pulmonary artery.   2. No appreciable pneumomediastinum/left pneumothorax.  3. Slight increased postoperative atelectasis/pulmonary edema.  4. New bilateral small pleural effusions.    I have personally reviewed the examination and initial interpretation  and I agree with the findings.    BONNIE ROWLAND MD

## 2017-12-21 NOTE — PROGRESS NOTES
CVICU PROGRESS NOTE: 12/21/2017  ASSESSMENT: Cricket Miguel is a 62 year old male with hx of COPD, HTN, HLD, CAD, HFpEF, aortic stenosis and ascending aortic aneurism s/p aortic valve replacement and aortic root replacement (April 2016) c/b moises-aortic abscess, endocarditis, cerebral septic emboli and severe AI s/p redo-sternotomy aortic root and valve homograft 12/19.       PLAN:   Neuro/ pain/ sedation: Hx of multifocal acute infarctions involving the left parietotemporal lobes, left cerebral hemisphere and right occipital lobe presumed to be septic emboli. Depression, mild delirium, followed by psych.  -Monitor neurological status. Notify the MD for any acute changes in exam.  -dilaudid and ativan PRN.  -precedex for agitation  Psych recs: start olanzapine, d/c seroquel. Serial EKGs     Pulmonary care: extubated 12/20  - On facemask saturation 97%.  - Not participating in pulmonary toilet, not coughing or actively clearing secretions due to delirium and inability to follow commands.  - Continue to encourage pulmonary toilet as able. RT called to do acapella therapy q4.     Cardiovascular:  HTN, HLD, HFpEF, CAD, AS and aortic aneurism s/p bental c/b endocarditis and severe AI s/p aortic root/valve homograft 12/19.  - Minimal Epinephrine to maintain MAP>65  -Keep SBP<120  -holding pta HCTZ, labetalol  -restarted ASA 81mg and atorvastatin.  -Repeat Echo shows EF 20-25% baseline 30%     GI care: PEG  -famotidine  -senna/colace  - Added miralax     Fluids/ Electrolytes/ Nutrition:   -PRN electrolyte replacement  -No indication for parenteral nutrition.     Renal/ Fluid Balance: Urine output is adequate so far.  -Espinoza catheter for strict intake and output.  - monitor BMP, creatinine  - Gave lasix 40mg x1 for volume overload.     Endocrine:   - insulin as needed.     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 Hx of MSSA septic arthritis and bacteremia. Hx of multifocal acute  infarctions involving the left parietotemporal lobes, left cerebral hemisphere and right occipital lobe presumed to be septic emboli. ID following.   -per ID, continue rifampin, zosyn/vanc  -Repeat blood cultures if febrile  -F/u surgical tissue cultures     Heme: acute blood loss anemia  - CBC stable.     Prophylaxis:    -Mechanical prophylaxis for DVT.   -start SC heparin.      Lines/ tubes/ drains:  -R IJI CVC, L radial arterial line, R triple lumen PICC, garza, PIVs     Disposition:  -CVICU.     Patient seen, findings and plan discussed with CVICU staff Susan Boyce MD  CA-3/[id number]-[id number]  12/21/2017  2:44 PM    - - - - - - - - - - - - - - - - - - - - - - - - - - - - - - - - - - - - - - - - - - - - - - - - - - - - - - - - - - - - - - - - - - - - - - - -     PRIMARY TEAM: MARCIETS  PRIMARY PHYSICIAN: Jero    REASON FOR CRITICAL CARE ADMISSION: s/p aortic root valve homograft   ADMITTING PHYSICIAN: Jero    HISTORY PRESENTING ILLNESS: Cricket Miguel is a 62 year old male with hx of COPD, HTN, HLD, CAD, HFpEF, aortic stenosis and ascending aortic aneurism s/p aortic valve replacement and aortic root replacement (April 2016) c/b moises-aortic abscess, endocarditis, cerebral septic emboli and severe AI s/p redo-sternotomy aortic root and valve homograft 12/19.     PHYSICAL EXAMINATION:  Temp:  [99  F (37.2  C)-101.1  F (38.4  C)] 99.1  F (37.3  C)  Heart Rate:  [61-84] 81  Resp:  [12-32] 16  MAP:  [52 mmHg-106 mmHg] 89 mmHg  Arterial Line BP: ()/(43-86) 134/72  SpO2:  [95 %-100 %] 100 %  General: extubated, inappropriate affect. Belligerent. Not oriented x 3.  HEENT: Normocephalic, atraumatic.    Chest wall: Symmetric thorax. Median sternotomy dressing c/d/i.   Respiratory: Equal BS bilateraly. Intubated and mechanically ventilated.  Cardiovascular: RRR, chest tubes draining serosanguinous fluid. Cool extremities  Gastrointestinal: Abdomen soft, non-distended.  Genitourinary: Garza catheter in place.   Skin: As  noted above. No rashes or lesions appreciated.   Lines: R IJ CVC, L radial arterial line, R PICC      Recent Labs  Lab 12/21/17  0154 12/20/17  2056 12/20/17  1730 12/20/17  0748   PH 7.40 7.41 7.44 7.43   PCO2 42 40 34* 37   PO2 146* 160* 107* 281*   HCO3 26 25 23 25     Complete Blood Count     Recent Labs  Lab 12/21/17  0154 12/20/17  1416 12/20/17  0748 12/20/17 0412 12/20/17  0002 12/19/17  1806   WBC 13.4*  --   --  15.5* 16.3* 16.1*   HGB 8.2* 8.2* 8.1* 8.0* 7.0* 9.4*     --   --  358 338 303     Basic Metabolic Panel    Recent Labs  Lab 12/21/17 0154 12/20/17 0748 12/20/17 0412 12/20/17  0002 12/19/17 1806     --  144 144 145*   POTASSIUM 3.9 4.3 4.2 3.8 3.4   CHLORIDE 109  --  109 109 109   CO2 27  --  25 26 29   BUN 16  --  13 11 10   CR 1.63*  --  1.33* 1.22 1.16   *  --  160* 179* 142*     Liver Function Tests    Recent Labs  Lab 12/21/17 0154 12/20/17  0748 12/19/17 1806 12/19/17  1653  12/18/17  0359 12/17/17 2024 12/17/17  0019   AST  --   --   --   --   --  32 29 33   ALT  --   --   --   --   --  30 32 34   ALKPHOS  --   --   --   --   --  92 94 102   BILITOTAL  --   --   --   --   --  1.3 1.0 1.3   ALBUMIN  --   --   --   --   --  1.5* 1.6* 1.6*   INR 1.35* 1.29* 0.90 0.81*  < > 1.12 1.10 1.06   < > = values in this interval not displayed.      Recent Labs  Lab 12/21/17 0154 12/20/17  0748 12/19/17 1806 12/19/17  1653 12/19/17  1515   INR 1.35* 1.29* 0.90 0.81* 1.56*   PTT  --  39* 52* 43* 37

## 2017-12-21 NOTE — PLAN OF CARE
Problem: Patient Care Overview  Goal: Plan of Care/Patient Progress Review  Outcome: No Change  Pt verbally aggressive with staff and significant other. Threatening towards staff. Will follow commands r/t hands and feet but will not redirect. Does not allow staff to discuss education. Pt yelled out all NOC for doctors. Would not redirect with reorientation. Told writer she was holding him captive. Precedex 0.7. Ativan PRN given x 2. Junctional/1 degree AVB over NOC. Labile BPs with Epi titrated as indicated. See flowsheets for hemodynamics. minimual CT output. Pt does not cough and spit up secretions. Will swallow them. Bites the yachter during suction attempts. 3L via oxyplus mask. PEG tube with TF infusing. UOP 30-60cc/hr.

## 2017-12-21 NOTE — PLAN OF CARE
Problem: Patient Care Overview  Goal: Plan of Care/Patient Progress Review  PT- Cancel and Hold, per chart review and communication with Occupational Therapist, pt with increased agitation during OT session this date, PT will Hold Evaluation today, and evaluate when appropriate to participate.

## 2017-12-21 NOTE — PROGRESS NOTES
[facility] ID SERVICE: ONGOING CONSULTATION   Cricket Miguel : 1955 Sex: male:   Medical record number [id number] Attending Physician: Tacho Muñoz MD  Date of Service: 2017    PROBLEM LIST:   - worsening aortic insufficiency with presumed moises-graft leak in the setting of a history of periaortic abscess s/p redo aortic root valve homograph    - MSSA bacteremia 2016, treated with 2 weeks of IV cefazolin. TTE negative.  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, currently on treatment with IV nafcillin and PO rifampin  - left knee MSSA septic arthritis 10/2017  - multifocal acute infarctions involving the left parietotemporal lobes, left cerebral hemisphere, and right occipital lobe presumed to be septic emboli  - possible HCAP , on zosyn and vancomycin    RECOMMENDATIONS:   - continue zosyn IV 4.5mg q 6h to treat Pseudomonas lung infection this will also treat MSSA  - discontinue vancomycin IV, no evidence of MRSA  - continue rifampin 300mg PO BID  - awaiting intraoperative tissue cultures  - awaiting sputum cultures from , gram stain with GNR    DISCUSSION:   62 year old male with known 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 At this time, it's unclear if the dehiscence of the aortic graft is due to a) worsening, uncontrolled infection, or b) mechanical issue with the graft or valve itself. Intraoperative cultures will be essential in determining whether there is an ongoing infection and are currently pending.  He developed a fever morning of , has gram negative rods on gram stain from the sputum. Empirically broadened to vanc/zosyn. Would continue zosyn while awaiting speciation of GNR. Discontinue vancomycin with no evidence of MRSA.    Above discussed with Infectious Diseases Staff, Dr. Carr.  ID will continue to follow.      Kinjal  MD Gerald  [facility] ID Fellow  [phone]  ID Staff Assessment and Plan:   Patient was seen and examined by me with the ID Fellow. The note above reflects our joint assessment and recommendations. I have reviewed the medical record, labs, imaging, vitals signs and have discussed the patient with the ID fellow in detail.     Jena Carr MD  ID staff physician  Pager [phone]           CHIEF INFECTIOUS DISEASES COMPLAINT:  History of MSSA prosthetic valve endocarditis, now with pseudoaneurysm and moises-graft leak    INTERVAL HISTORY:   Extubated. Agitated overnight. Sputum culture with GNR on gram stain    ROS: A five-point review of systems was obtained and was negative with the exception of that which is described above.  Allergies   Allergen Reactions     Lisinopril Swelling     One-sided facial swelling after being on lisinopril/HCTZ for one week.      Allergies were reviewed.    No current outpatient prescriptions on file.       CURRENT ANTI-INFECTIVES:   - zosyn IV 4.5mg q6h 12/20--  - vanc IV, dosing per protocol    EXAMINATION:   BP (!) 82/58  Temp 99  F (37.2  C)  Resp 16  Wt 87.8 kg (193 lb 9 oz)  SpO2 100%  BMI 29.43 kg/m2  GENERAL:  No distress. somnolent  EYES:  Eyes have anicteric sclerae without conjunctival injection.  ENT:  ETT in place  NECK:  Supple. No cervical lymphadenopathy.  LUNGS:  Coarse bilaterally on anterior exam.   CARDIOVASCULAR:  Regular rate and rhythm with no murmurs, gallops or rubs.  ABDOMEN:  Normal bowel sounds, soft, nontender. PEG c/d/i  SKIN:  No acute rashes. Line(s) are in place without any surrounding erythema or exudate.   NEUROLOGIC:  somnolent  PSYCH: somnolent  CURRENT LINES: LUE PIV 12/17, PICC RUE 10/21, Gastrostomy tube 10/30/17, PIV RUE 12/19, CVC 12/19, 4 chest tubes, urethral catheter    NEW DATA/RESULTS:   All interval basic labs, microbiology results and imaging were reviewed.    Culture Micro   Date Value Ref Range Status   12/20/2017   Final     Canceled, Test credited  Incorrectly ordered by PCU/Clinic  see ACTIN CULTURE instead     12/20/2017   Final    Notification of test cancellation was given to  Noah Hood RN at 1448 12.20.17. hd     12/19/2017 Culture negative monitoring continues  Preliminary   12/19/2017 Culture negative after 23 hours  Preliminary   12/19/2017 Culture negative monitoring continues  Preliminary       Recent Labs   Lab Test  12/20/17   0748  12/17/17   0730  10/23/17   0341  10/21/17   0335  10/16/17   0323  10/09/17   0316   CRP  87.0*  65.0*  30.0*  44.0*  48.0*  240.0*     Recent Labs   Lab Test  12/21/17   0154  12/20/17   0412  12/20/17   0002  12/19/17   1806  12/19/17   0606  12/18/17   0359   WBC  13.4*  15.5*  16.3*  16.1*  11.2*  9.5     Recent Labs   Lab Test  12/21/17   0154  12/20/17   0412  12/20/17   0002  12/19/17   1806   CR  1.63*  1.33*  1.22  1.16   GFRESTIMATED  43*  54*  60*  63       Hematology Studies  Recent Labs   Lab Test  12/21/17   0154  12/20/17   0412  12/20/17   0002  12/19/17   1806   12/19/17   0606  12/18/17   0359   12/17/17   0019  12/16/17   1935   10/06/17   1020   05/06/13   1703  12/04/12   1016  02/02/11   1506   WBC  13.4*  15.5*  16.3*  16.1*   --   11.2*  9.5   < >  11.2*  9.8   < >  17.1*   < >  10.6  11.9*  11.9*   ANEU   --    --    --    --    --    --    --    --   7.8  6.6   --   15.7*   --   7.2  7.9  8.2   AEOS   --    --    --    --    --    --    --    --   1.1*  1.0*   --   0.0   --   0.0  0.4  0.3   HCT  25.0*  24.0*  21.4*  29.0*   --   34.0*  33.4*   < >  34.0*  31.5*   < >  43.8   < >  46.2  47.4  46.8   PLT  250  358  338  303   < >  438  423   < >  420  391   < >  185   < >  315  259  315    < > = values in this interval not displayed.       Metabolic  Recent Labs   Lab Test  12/21/17   0154  12/20/17   0412  12/20/17   0002   NA  143  144  144   BUN  16  13  11   CO2  27  25  26   CR  1.63*  1.33*  1.22   GFRESTIMATED  43*  54*  60*       Hepatic Studies  Recent Labs    Lab Test  12/18/17   0359  12/17/17 2024 12/17/17   0019   BILITOTAL  1.3  1.0  1.3   ALKPHOS  92  94  102   ALBUMIN  1.5*  1.6*  1.6*   AST  32  29  33   ALT  30  32  34       Immunologlobulins  Recent Labs   Lab Test  10/23/17   0341  10/21/17   0357  05/06/13   1703   SED  122*  108*  6

## 2017-12-21 NOTE — PLAN OF CARE
Problem: Patient Care Overview  Goal: Plan of Care/Patient Progress Review  OT/CR 4E: Attempted to engage pt in bed mobility and progress post surgical activity.  Pt max A for rolling and dependent to transfer to bedside chair.  Pt dependent transfer at baseline.  Recommend pt move to lift room to assist with post surgical activity.  Attempted to engage pt in goal directed tasks; however, pt with increasing agitation with cues and attempts at redirection.     Discharge Planner OT   Patient plan for discharge: home with home OT/PT/SLP  Current status: see above for details  Barriers to return to prior living situation: behaviors, agitation, max A/dependent for all mobility and self cares (near pt baseline), acute medical needs  Recommendations for discharge: Home with 24hr assist and home OT/PT/SLP -   Rationale for recommendations: Pt near baseline - dependent for transfers at home - has all necessary adaptive equipment at home       Entered by: Maira Hou 12/21/2017 4:19 PM

## 2017-12-21 NOTE — PROGRESS NOTES
Care Coordinator Progress Note     Admission Date/Time:  12/16/2017  Attending MD:  Pili Bowers,*     Data  Chart reviewed, discussed with interdisciplinary team.   Patient was admitted with SOB.    Pt is with a PMH of aortic stenosis with ascending aortic aneurysm/CAD s/p aortic valve replacement and aortic root replacement with composite graft (April 2016), HFpEF, COPD, HTN, HLD. He was found to have a large moises-aortic abscess around his replaced root & valve, and multiple strokes from septic emboli from the aorta on a recent admission in 10/2017.  Was discharged to an LTAC recently, now presents with worsening shortness of breath and concern for severe aortic regurgitation on bedside echo.          Concerns with insurance coverage for discharge needs: None.  Current Living Situation: Patient lives with significant other.  Support System: Significant other and Childrens- Supportive and Involved  Services Involved: Home Infusion ([facility] # [phone]) Home care ([facility] # [phone], RN 1x/wk, PT daily, OT daily and SP 2X/wk).  Transportation: Family or Friend will provide  Barriers to Discharge: Pt is not medically stable for d/c.       Assessment  Pt had redo- sternotomy aortic root and valve homograft done on 12/19 and extubated yesterday, 12/20.  Pt and family are well known to me from previous hospitalization.  Visited pt and SO, Meghna to introduce self again and for support.  Pt was restless and not fully oriented at time of my visit.  Per discussion with Meghna, pt was d/c'd to home from [place] 2 weeks ago.  Meghna stated pt was receiving continues infusion, IV abx from [facility] and home RN, PT, OT and speech from [facility].  Meghna stated she assist him with all cares.  Pt has hospital bed and shelia lift.  CC discussed with Meghna tentative d/c plan, home with resumption home care vs Rehab.  Meghna stated they are open for rehab, if that is recommended.  CC informed Meghna that HECTOR  and SW will cont to follow up and assist with d/c planning.  Meghna agreed with the plan and will update pt Childrens too.     Plan  Anticipated Discharge Date:  TBD.  Anticipated Discharge Plan:  Home with resumption home care vs Rehab.  CC will con. to follow plan of care.      Matthew Keller RN, PHN, BSN  4A and 4E/ ICU  Care Coordinator  Phone: [phone]  Pager: [phone]

## 2017-12-21 NOTE — CONSULTS
Please see Dr. Rojas's consult note from today.   BIBI Snell CNP  Electrophysiology Consult Service  Pager: [phone]

## 2017-12-22 ENCOUNTER — APPOINTMENT (OUTPATIENT)
Dept: OCCUPATIONAL THERAPY | Facility: CLINIC | Age: 62
DRG: 219 | End: 2017-12-22
Attending: INTERNAL MEDICINE
Payer: COMMERCIAL

## 2017-12-22 ENCOUNTER — APPOINTMENT (OUTPATIENT)
Dept: SPEECH THERAPY | Facility: CLINIC | Age: 62
DRG: 219 | End: 2017-12-22
Attending: INTERNAL MEDICINE
Payer: COMMERCIAL

## 2017-12-22 ENCOUNTER — APPOINTMENT (OUTPATIENT)
Dept: GENERAL RADIOLOGY | Facility: CLINIC | Age: 62
DRG: 219 | End: 2017-12-22
Attending: SURGERY
Payer: COMMERCIAL

## 2017-12-22 LAB
ABO + RH BLD: NORMAL
ABO + RH BLD: NORMAL
ANION GAP SERPL CALCULATED.3IONS-SCNC: 7 MMOL/L (ref 3–14)
BACTERIA SPEC CULT: ABNORMAL
BACTERIA SPEC CULT: ABNORMAL
BASE EXCESS BLDA CALC-SCNC: 2.9 MMOL/L
BASE EXCESS BLDV CALC-SCNC: 4 MMOL/L
BLD GP AB SCN SERPL QL: NORMAL
BLD PROD TYP BPU: NORMAL
BLD UNIT ID BPU: 0
BLOOD BANK CMNT PATIENT-IMP: NORMAL
BLOOD PRODUCT CODE: NORMAL
BPU ID: NORMAL
BUN SERPL-MCNC: 26 MG/DL (ref 7–30)
CALCIUM SERPL-MCNC: 8.4 MG/DL (ref 8.5–10.1)
CHLORIDE SERPL-SCNC: 110 MMOL/L (ref 94–109)
CO2 SERPL-SCNC: 29 MMOL/L (ref 20–32)
CREAT SERPL-MCNC: 1.63 MG/DL (ref 0.66–1.25)
GFR SERPL CREATININE-BSD FRML MDRD: 43 ML/MIN/1.7M2
GLUCOSE BLDC GLUCOMTR-MCNC: 103 MG/DL (ref 70–99)
GLUCOSE BLDC GLUCOMTR-MCNC: 157 MG/DL (ref 70–99)
GLUCOSE BLDC GLUCOMTR-MCNC: 84 MG/DL (ref 70–99)
GLUCOSE SERPL-MCNC: 112 MG/DL (ref 70–99)
HCO3 BLD-SCNC: 28 MMOL/L (ref 21–28)
HCO3 BLDV-SCNC: 30 MMOL/L (ref 21–28)
HGB BLD-MCNC: 7.9 G/DL (ref 13.3–17.7)
HGB BLD-MCNC: 7.9 G/DL (ref 13.3–17.7)
INTERPRETATION ECG - MUSE: NORMAL
MAGNESIUM SERPL-MCNC: 2.1 MG/DL (ref 1.6–2.3)
MAGNESIUM SERPL-MCNC: 2.2 MG/DL (ref 1.6–2.3)
O2/TOTAL GAS SETTING VFR VENT: ABNORMAL %
O2/TOTAL GAS SETTING VFR VENT: ABNORMAL %
OXYHGB MFR BLD: 97 % (ref 92–100)
OXYHGB MFR BLDV: 53 %
PCO2 BLD: 41 MM HG (ref 35–45)
PCO2 BLDV: 51 MM HG (ref 40–50)
PH BLD: 7.43 PH (ref 7.35–7.45)
PH BLDV: 7.38 PH (ref 7.32–7.43)
PHOSPHATE SERPL-MCNC: 3.3 MG/DL (ref 2.5–4.5)
PLATELET # BLD AUTO: 210 10E9/L (ref 150–450)
PO2 BLD: 130 MM HG (ref 80–105)
PO2 BLDV: 29 MM HG (ref 25–47)
POTASSIUM SERPL-SCNC: 3.7 MMOL/L (ref 3.4–5.3)
POTASSIUM SERPL-SCNC: 3.8 MMOL/L (ref 3.4–5.3)
SODIUM SERPL-SCNC: 145 MMOL/L (ref 133–144)
SPECIMEN EXP DATE BLD: NORMAL
SPECIMEN SOURCE: ABNORMAL
TRANSFUSION STATUS PATIENT QL: NORMAL
TRANSFUSION STATUS PATIENT QL: NORMAL
TROPONIN I SERPL-MCNC: 2.33 UG/L (ref 0–0.04)

## 2017-12-22 PROCEDURE — 40000196 ZZH STATISTIC RAPCV CVP MONITORING

## 2017-12-22 PROCEDURE — 25000132 ZZH RX MED GY IP 250 OP 250 PS 637: Performed by: HOSPITALIST

## 2017-12-22 PROCEDURE — 25000132 ZZH RX MED GY IP 250 OP 250 PS 637

## 2017-12-22 PROCEDURE — 94640 AIRWAY INHALATION TREATMENT: CPT

## 2017-12-22 PROCEDURE — 25000125 ZZHC RX 250: Performed by: THORACIC SURGERY (CARDIOTHORACIC VASCULAR SURGERY)

## 2017-12-22 PROCEDURE — 94640 AIRWAY INHALATION TREATMENT: CPT | Mod: 76

## 2017-12-22 PROCEDURE — 97110 THERAPEUTIC EXERCISES: CPT | Mod: GO

## 2017-12-22 PROCEDURE — 80048 BASIC METABOLIC PNL TOTAL CA: CPT | Performed by: PHYSICIAN ASSISTANT

## 2017-12-22 PROCEDURE — 40000014 ZZH STATISTIC ARTERIAL MONITORING DAILY

## 2017-12-22 PROCEDURE — 25000128 H RX IP 250 OP 636: Performed by: SURGERY

## 2017-12-22 PROCEDURE — 86850 RBC ANTIBODY SCREEN: CPT | Performed by: SURGERY

## 2017-12-22 PROCEDURE — 00000146 ZZHCL STATISTIC GLUCOSE BY METER IP

## 2017-12-22 PROCEDURE — 27210437 ZZH NUTRITION PRODUCT SEMIELEM INTERMED LITER

## 2017-12-22 PROCEDURE — 25000128 H RX IP 250 OP 636

## 2017-12-22 PROCEDURE — 93010 ELECTROCARDIOGRAM REPORT: CPT | Performed by: INTERNAL MEDICINE

## 2017-12-22 PROCEDURE — 82805 BLOOD GASES W/O2 SATURATION: CPT | Performed by: THORACIC SURGERY (CARDIOTHORACIC VASCULAR SURGERY)

## 2017-12-22 PROCEDURE — 83735 ASSAY OF MAGNESIUM: CPT | Performed by: PHYSICIAN ASSISTANT

## 2017-12-22 PROCEDURE — 84484 ASSAY OF TROPONIN QUANT: CPT | Performed by: ANESTHESIOLOGY

## 2017-12-22 PROCEDURE — 83735 ASSAY OF MAGNESIUM: CPT | Performed by: SURGERY

## 2017-12-22 PROCEDURE — 25000132 ZZH RX MED GY IP 250 OP 250 PS 637: Performed by: ANESTHESIOLOGY

## 2017-12-22 PROCEDURE — 25000132 ZZH RX MED GY IP 250 OP 250 PS 637: Performed by: SURGERY

## 2017-12-22 PROCEDURE — 25000132 ZZH RX MED GY IP 250 OP 250 PS 637: Performed by: PHYSICIAN ASSISTANT

## 2017-12-22 PROCEDURE — 85049 AUTOMATED PLATELET COUNT: CPT | Performed by: PHYSICIAN ASSISTANT

## 2017-12-22 PROCEDURE — 86900 BLOOD TYPING SEROLOGIC ABO: CPT | Performed by: SURGERY

## 2017-12-22 PROCEDURE — 40000133 ZZH STATISTIC OT WARD VISIT

## 2017-12-22 PROCEDURE — 84132 ASSAY OF SERUM POTASSIUM: CPT | Performed by: SURGERY

## 2017-12-22 PROCEDURE — 94667 MNPJ CHEST WALL 1ST: CPT

## 2017-12-22 PROCEDURE — 85018 HEMOGLOBIN: CPT | Performed by: PHYSICIAN ASSISTANT

## 2017-12-22 PROCEDURE — 71010 XR CHEST PORT 1 VW: CPT

## 2017-12-22 PROCEDURE — 40000048 ZZH STATISTIC DAILY SWAN MONITORING

## 2017-12-22 PROCEDURE — 92526 ORAL FUNCTION THERAPY: CPT | Mod: GN | Performed by: SPEECH-LANGUAGE PATHOLOGIST

## 2017-12-22 PROCEDURE — 85018 HEMOGLOBIN: CPT | Performed by: SURGERY

## 2017-12-22 PROCEDURE — 25000128 H RX IP 250 OP 636: Performed by: THORACIC SURGERY (CARDIOTHORACIC VASCULAR SURGERY)

## 2017-12-22 PROCEDURE — 40000275 ZZH STATISTIC RCP TIME EA 10 MIN

## 2017-12-22 PROCEDURE — 25000125 ZZHC RX 250: Performed by: SURGERY

## 2017-12-22 PROCEDURE — 25000132 ZZH RX MED GY IP 250 OP 250 PS 637: Performed by: THORACIC SURGERY (CARDIOTHORACIC VASCULAR SURGERY)

## 2017-12-22 PROCEDURE — 20000004 ZZH R&B ICU UMMC

## 2017-12-22 PROCEDURE — 40000225 ZZH STATISTIC SLP WARD VISIT: Performed by: SPEECH-LANGUAGE PATHOLOGIST

## 2017-12-22 PROCEDURE — 93005 ELECTROCARDIOGRAM TRACING: CPT

## 2017-12-22 PROCEDURE — 84100 ASSAY OF PHOSPHORUS: CPT | Performed by: PHYSICIAN ASSISTANT

## 2017-12-22 PROCEDURE — 25000132 ZZH RX MED GY IP 250 OP 250 PS 637: Performed by: INTERNAL MEDICINE

## 2017-12-22 PROCEDURE — 86901 BLOOD TYPING SEROLOGIC RH(D): CPT | Performed by: SURGERY

## 2017-12-22 PROCEDURE — 25000128 H RX IP 250 OP 636: Performed by: PHYSICIAN ASSISTANT

## 2017-12-22 PROCEDURE — 97530 THERAPEUTIC ACTIVITIES: CPT | Mod: GO

## 2017-12-22 PROCEDURE — 99233 SBSQ HOSP IP/OBS HIGH 50: CPT | Mod: GC | Performed by: ANESTHESIOLOGY

## 2017-12-22 PROCEDURE — 25000125 ZZHC RX 250: Performed by: NEUROLOGICAL SURGERY

## 2017-12-22 RX ORDER — OLANZAPINE 2.5 MG/1
7.5 TABLET, FILM COATED ORAL 2 TIMES DAILY
Status: DISCONTINUED | OUTPATIENT
Start: 2017-12-22 | End: 2017-12-23

## 2017-12-22 RX ORDER — NAFCILLIN SODIUM 2 G/8ML
2 INJECTION, POWDER, FOR SOLUTION INTRAMUSCULAR; INTRAVENOUS EVERY 4 HOURS
Status: DISCONTINUED | OUTPATIENT
Start: 2017-12-22 | End: 2017-12-22

## 2017-12-22 RX ORDER — HYDROMORPHONE HYDROCHLORIDE 1 MG/ML
0.2 INJECTION, SOLUTION INTRAMUSCULAR; INTRAVENOUS; SUBCUTANEOUS
Status: DISCONTINUED | OUTPATIENT
Start: 2017-12-22 | End: 2018-01-04 | Stop reason: HOSPADM

## 2017-12-22 RX ORDER — CEFEPIME 1 G/50ML
2 INJECTION, SOLUTION INTRAVENOUS EVERY 8 HOURS
Status: DISCONTINUED | OUTPATIENT
Start: 2017-12-22 | End: 2017-12-26

## 2017-12-22 RX ORDER — FAMOTIDINE 40 MG/5ML
20 POWDER, FOR SUSPENSION ORAL DAILY
Status: DISCONTINUED | OUTPATIENT
Start: 2017-12-22 | End: 2017-12-27

## 2017-12-22 RX ADMIN — ACETYLCYSTEINE 2 ML: 100 SOLUTION ORAL; RESPIRATORY (INHALATION) at 08:12

## 2017-12-22 RX ADMIN — DEXMEDETOMIDINE HYDROCHLORIDE 0.3 MCG/KG/HR: 4 INJECTION, SOLUTION INTRAVENOUS at 23:38

## 2017-12-22 RX ADMIN — HYDROMORPHONE HYDROCHLORIDE 0.2 MG: 1 INJECTION, SOLUTION INTRAMUSCULAR; INTRAVENOUS; SUBCUTANEOUS at 13:38

## 2017-12-22 RX ADMIN — HEPARIN SODIUM 5000 UNITS: 5000 INJECTION, SOLUTION INTRAVENOUS; SUBCUTANEOUS at 01:04

## 2017-12-22 RX ADMIN — HYDRALAZINE HYDROCHLORIDE 10 MG: 20 INJECTION INTRAMUSCULAR; INTRAVENOUS at 06:35

## 2017-12-22 RX ADMIN — ACETAMINOPHEN 975 MG: 325 TABLET, FILM COATED ORAL at 01:03

## 2017-12-22 RX ADMIN — Medication 0.2 MG: at 23:42

## 2017-12-22 RX ADMIN — PIPERACILLIN SODIUM AND TAZOBACTAM SODIUM 4.5 G: 36; 4.5 INJECTION, POWDER, LYOPHILIZED, FOR SOLUTION INTRAVENOUS at 06:35

## 2017-12-22 RX ADMIN — HYDRALAZINE HYDROCHLORIDE 10 MG: 20 INJECTION INTRAMUSCULAR; INTRAVENOUS at 07:01

## 2017-12-22 RX ADMIN — ACETYLCYSTEINE 2 ML: 100 SOLUTION ORAL; RESPIRATORY (INHALATION) at 15:59

## 2017-12-22 RX ADMIN — OXYCODONE HYDROCHLORIDE 5 MG: 5 TABLET ORAL at 05:18

## 2017-12-22 RX ADMIN — CEFEPIME 2 G: 1 INJECTION, SOLUTION INTRAVENOUS at 21:23

## 2017-12-22 RX ADMIN — ATORVASTATIN CALCIUM 40 MG: 40 TABLET, FILM COATED ORAL at 07:41

## 2017-12-22 RX ADMIN — MULTIVITAMIN 15 ML: LIQUID ORAL at 07:52

## 2017-12-22 RX ADMIN — OLANZAPINE 7.5 MG: 5 TABLET, FILM COATED ORAL at 20:23

## 2017-12-22 RX ADMIN — MUPIROCIN 0.5 G: 20 OINTMENT TOPICAL at 07:54

## 2017-12-22 RX ADMIN — HEPARIN SODIUM 5000 UNITS: 5000 INJECTION, SOLUTION INTRAVENOUS; SUBCUTANEOUS at 17:52

## 2017-12-22 RX ADMIN — KETOTIFEN FUMARATE 1 DROP: 0.25 SOLUTION/ DROPS OPHTHALMIC at 13:41

## 2017-12-22 RX ADMIN — ACETAMINOPHEN 975 MG: 325 TABLET, FILM COATED ORAL at 10:08

## 2017-12-22 RX ADMIN — PIPERACILLIN SODIUM AND TAZOBACTAM SODIUM 4.5 G: 36; 4.5 INJECTION, POWDER, LYOPHILIZED, FOR SOLUTION INTRAVENOUS at 01:04

## 2017-12-22 RX ADMIN — CEFEPIME 2 G: 1 INJECTION, SOLUTION INTRAVENOUS at 12:33

## 2017-12-22 RX ADMIN — ASPIRIN 81 MG CHEWABLE TABLET 81 MG: 81 TABLET CHEWABLE at 07:41

## 2017-12-22 RX ADMIN — ONDANSETRON 4 MG: 4 TABLET, ORALLY DISINTEGRATING ORAL at 18:13

## 2017-12-22 RX ADMIN — POTASSIUM CHLORIDE 20 MEQ: 1.5 POWDER, FOR SOLUTION ORAL at 05:36

## 2017-12-22 RX ADMIN — MIRTAZAPINE 30 MG: 15 TABLET, FILM COATED ORAL at 21:39

## 2017-12-22 RX ADMIN — FAMOTIDINE 20 MG: 40 POWDER, FOR SUSPENSION ORAL at 07:50

## 2017-12-22 RX ADMIN — ACETYLCYSTEINE 2 ML: 100 SOLUTION ORAL; RESPIRATORY (INHALATION) at 13:14

## 2017-12-22 RX ADMIN — KETOTIFEN FUMARATE 1 DROP: 0.25 SOLUTION/ DROPS OPHTHALMIC at 20:32

## 2017-12-22 RX ADMIN — VENLAFAXINE 50 MG: 100 TABLET ORAL at 07:55

## 2017-12-22 RX ADMIN — Medication 300 MG: at 07:51

## 2017-12-22 RX ADMIN — HYDRALAZINE HYDROCHLORIDE 10 MG: 20 INJECTION INTRAMUSCULAR; INTRAVENOUS at 16:50

## 2017-12-22 RX ADMIN — OLANZAPINE 5 MG: 5 TABLET, FILM COATED ORAL at 07:09

## 2017-12-22 RX ADMIN — POTASSIUM CHLORIDE 20 MEQ: 1.5 POWDER, FOR SOLUTION ORAL at 18:49

## 2017-12-22 RX ADMIN — ACETYLCYSTEINE 2 ML: 100 SOLUTION ORAL; RESPIRATORY (INHALATION) at 19:15

## 2017-12-22 RX ADMIN — VENLAFAXINE 50 MG: 100 TABLET ORAL at 20:25

## 2017-12-22 RX ADMIN — HYDRALAZINE HYDROCHLORIDE 10 MG: 20 INJECTION INTRAMUSCULAR; INTRAVENOUS at 22:33

## 2017-12-22 RX ADMIN — KETOTIFEN FUMARATE 1 DROP: 0.25 SOLUTION/ DROPS OPHTHALMIC at 07:51

## 2017-12-22 RX ADMIN — INSULIN ASPART: 100 INJECTION, SOLUTION INTRAVENOUS; SUBCUTANEOUS at 12:33

## 2017-12-22 RX ADMIN — Medication 300 MG: at 20:24

## 2017-12-22 RX ADMIN — HEPARIN SODIUM 5000 UNITS: 5000 INJECTION, SOLUTION INTRAVENOUS; SUBCUTANEOUS at 10:08

## 2017-12-22 NOTE — PROGRESS NOTES
[facility] ID SERVICE: ONGOING CONSULTATION   Cricket Miguel : 1955 Sex: male:   Medical record number [id number] Attending Physician: Tacho Muñoz MD  Date of Service: 2017    PROBLEM LIST:   - worsening aortic insufficiency with presumed moises-graft leak in the setting of a history of periaortic abscess s/p redo aortic root valve homograph    - MSSA bacteremia 2016, treated with 2 weeks of IV cefazolin. TTE negative.  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, currently on 3 month treatment with IV nafcillin and PO rifampin  - left knee MSSA septic arthritis 10/2017  - multifocal acute infarctions involving the left parietotemporal lobes, left cerebral hemisphere, and right occipital lobe presumed to be septic emboli  - pseudomonas HCAP     RECOMMENDATIONS:   - discontinue zosyn  - restart PTA nafcillin 2g IV q4h, previously planned duration until 18  - continue rifampin 300mg PO BID, previously planned duration until 18  - start ciprofloxacin 750mg PO BID ( or 400 mg IV) for 2 total weeks duration for pseudomonas pneumonia (end date 1/3/18)  - reassuring that intraoperative cultures are negative to date  - ID will sign off, please do not hesitate to call with questions.    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 With negative intraoperative cultures to date, mechanical issue involving the valve most likely caused the dehiscence and not an ongoing infection.  He developed a fever morning of , and was found to have pseudomonas pneumonia which can be treated with PO ciprofloxacin. At this time, would prefer to change back to the preferred MSSA coverage, nafcillin. Anticipate a total 2 week course for the pseudomonas pneumonia. His original anticipated end date for the MSSA prosthetic valve  endocarditis treatment with nafcillin and rifampin was 1/7/18 and would continue with that plan.     Above discussed with Infectious Diseases Staff, Dr. Carr.     Kinjal Duarte MD  [facility] ID Fellow  [phone]  ID Staff Assessment and Plan:   Patient was seen and examined by me with the ID Fellow. The note above reflects our joint assessment and recommendations. I have reviewed the medical record, labs, imaging, vitals signs and have discussed the patient with the ID fellow in detail.   Jena Carr MD  ID staff physician  Pager [phone]    ID Staff Recommendations ADDENDUM 12/22/17 4:50pm:   I spoke with [facility] resident regarding the recommendation to change Zosyn to cipro and restart nafcillin and continue PO rifampin. The patient has prolonged QT interval on EKG today and is on olanzapine for agitation. They are concerned that addition of the cipro would worsen the QT prolongation. I agree that this is a contraindication to adding the ciprofloxacin now and agree that the IV cefepime would be an acceptable alternative to treat the Pseudomonas in the lungs and to complete the planned three month treatment course for the MSSA prosthetic valve endocarditis with planned treatment course through 1/7/18. Will plan to complete the IV cefepime 2 grams Q 8 hours through 1/7/18 with the oral rifampin. Would recommend that you consider checking follow-up imaging of the brain towards the end of this planned course to verify that there are no residual brain abscesses from the septic emboli to the brain which occurred in Oct. 2017. If the tissue cultures from the cardiac surgery on 12/19/17 remain negative this should be an adequate course of antibiotics. If the tissue cultures turn positive then we will need to re-evaluate and extend antibiotic therapy longer.   Jena Carr MD  ID staff physician  Pager [phone]         CHIEF INFECTIOUS DISEASES COMPLAINT:  History of MSSA prosthetic valve endocarditis, now with  pseudoaneurysm and moises-graft leak    INTERVAL HISTORY:   Continues to have agitation, on precedex.     ROS: A five-point review of systems was obtained and was negative with the exception of that which is described above.  Allergies   Allergen Reactions     Lisinopril Swelling     One-sided facial swelling after being on lisinopril/HCTZ for one week.      Allergies were reviewed.    No current outpatient prescriptions on file.       CURRENT ANTI-INFECTIVES:   - zosyn IV 4.5mg q6h 12/20--  - vanc IV, dosing per protocol    EXAMINATION:   /90  Temp 97.5  F (36.4  C) (Pulmonary Artery)  Resp 22  Wt 84.6 kg (186 lb 8.2 oz)  SpO2 98%  BMI 28.36 kg/m2  GENERAL:  No distress. somnolent  EYES:  Eyes have anicteric sclerae without conjunctival injection.  ENT:  Scar from prior trach  NECK:  Supple. No cervical lymphadenopathy.  LUNGS:  Coarse bilaterally on anterior exam.   CARDIOVASCULAR:  Regular rate and rhythm with no murmurs, gallops or rubs. 1 chest tube remains  ABDOMEN:  Normal bowel sounds, soft, nontender. PEG c/d/i  SKIN:  No acute rashes. Line(s) are in place without any surrounding erythema or exudate.   NEUROLOGIC:  somnolent  PSYCH: somnolent  CURRENT LINES: LUE PIV 12/17, PICC RUE 10/21, Gastrostomy tube 10/30/17, PIV RUE 12/19, CVC 12/19, 4 chest tubes, urethral catheter    NEW DATA/RESULTS:   All interval basic labs, microbiology results and imaging were reviewed.    Culture Micro   Date Value Ref Range Status   12/21/2017 PENDING  Preliminary   12/20/2017 Heavy growth  Pseudomonas aeruginosa   (A)  Preliminary   12/20/2017 Light growth  Yeast   (A)  Preliminary   12/20/2017   Final    Canceled, Test credited  Incorrectly ordered by PCU/Clinic  see ACTIN CULTURE instead     12/20/2017   Final    Notification of test cancellation was given to  Noah Hood RN at 3558 12.20.17.      12/20/2017 Culture negative monitoring continues  Preliminary       Recent Labs   Lab Test  12/20/17   3421   12/17/17   0730  10/23/17   0341  10/21/17   0335  10/16/17   0323  10/09/17   0316   CRP  87.0*  65.0*  30.0*  44.0*  48.0*  240.0*     Recent Labs   Lab Test  12/21/17   0154  12/20/17   0412  12/20/17   0002  12/19/17   1806  12/19/17   0606  12/18/17   0359   WBC  13.4*  15.5*  16.3*  16.1*  11.2*  9.5     Recent Labs   Lab Test  12/22/17   0401  12/21/17   0154  12/20/17   0412  12/20/17   0002   CR  1.63*  1.63*  1.33*  1.22   GFRESTIMATED  43*  43*  54*  60*       Hematology Studies  Recent Labs   Lab Test  12/22/17   0401  12/21/17   0154  12/20/17   0412  12/20/17   0002  12/19/17   1806   12/19/17   0606  12/18/17   0359   12/17/17   0019  12/16/17   1935   10/06/17   1020   05/06/13   1703  12/04/12   1016  02/02/11   1506   WBC   --   13.4*  15.5*  16.3*  16.1*   --   11.2*  9.5   < >  11.2*  9.8   < >  17.1*   < >  10.6  11.9*  11.9*   ANEU   --    --    --    --    --    --    --    --    --   7.8  6.6   --   15.7*   --   7.2  7.9  8.2   AEOS   --    --    --    --    --    --    --    --    --   1.1*  1.0*   --   0.0   --   0.0  0.4  0.3   HCT   --   25.0*  24.0*  21.4*  29.0*   --   34.0*  33.4*   < >  34.0*  31.5*   < >  43.8   < >  46.2  47.4  46.8   PLT  210  250  358  338  303   < >  438  423   < >  420  391   < >  185   < >  315  259  315    < > = values in this interval not displayed.       Metabolic  Recent Labs   Lab Test  12/22/17   0401  12/21/17   0154  12/20/17   0412   NA  145*  143  144   BUN  26  16  13   CO2  29  27  25   CR  1.63*  1.63*  1.33*   GFRESTIMATED  43*  43*  54*       Hepatic Studies  Recent Labs   Lab Test  12/18/17   0359  12/17/17   2024  12/17/17   0019   BILITOTAL  1.3  1.0  1.3   ALKPHOS  92  94  102   ALBUMIN  1.5*  1.6*  1.6*   AST  32  29  33   ALT  30  32  34       Immunologlobulins  Recent Labs   Lab Test  10/23/17   0341  10/21/17   0357  05/06/13   1703   SED  122*  108*  6

## 2017-12-22 NOTE — PLAN OF CARE
Problem: Patient Care Overview  Goal: Plan of Care/Patient Progress Review  Outcome: No Change                       Alert to self only, restless/agitated at times, follow commands, garbled speech, R sided weakness with left facial droop, Afebrile. CVP 10, PA 38/14 CO 6.6 CI 3.3, MAP goal maintained >65, satting >92% on 2L oxymask, deep suctioned x2 thick brown secretion, unable to clear secretion independently, TF@goal, CT 40cc sanguinous Q2, 100-150cc UOP via indwelling cath, stools loose x 2. Restarted PDX@0600, Hydralazine 10mg given, repeated x 1. Continue to monitor and intervene as indicated.

## 2017-12-22 NOTE — PLAN OF CARE
Problem: Patient Care Overview  Goal: Plan of Care/Patient Progress Review  Discharge Planner OT   Patient plan for discharge: Not stated  Current status: Patient more calm and cooperative this session, max A for rolling to place sling. Patient dependent sling to chair, requires encouragement but agreeable to UE exercises to promote functional strength. Patient tolerates AAROM x10 reps in all planes in LUE, able to initiate to commands. Patient tolerates PROM x10 reps within limited range in RUE, patient with baseline deficits and reports pain limiting higher level range.  Barriers to return to prior living situation: Mentation, generalized deconditioning, post-surgical precautions, increased need for assist  Recommendations for discharge: TCU  Rationale for recommendations: Patient requiring increased amount of assist with all mobility and ADLs and will benefit from continued inpatient therapy.       Entered by: Esther Erickson 12/22/2017 11:38 AM

## 2017-12-22 NOTE — CONSULTS
DATE OF CONSULTATION:  12/21/2017      IDENTIFICATION:  Mr. Cricket Miguel is a 62-year-old white male who is status post  very significant cardiac issues including aortic valve replacement with a septic embolus to the brain as well as aortic leak with repair.  I am asked to evaluate his delirium by Dr. Bowers.      HISTORY OF PRESENT ILLNESS:  Prior to interviewing this patient, I had an opportunity to review the initial consultation completed by Dr. Mello Babin on 12/18/2017.  Today the patient's mental status is significantly worse than it was during Dr. Babin's interview; however, I am told that his mental status waxes and wanes.  He has been consistently delirious for over a month and was being treated for delirium when he was a patient at [facility] prior to this hospitalization.      In interviewing the patient, I learned very little because he is completely disoriented.  He is in a Hattie chair with soft restraints and makes no sense.  He is very tangential.  There is no logic or linearity to his thought process.  Happily his girlfriend who has been with him this entire time was in the room and eager to help.  She reports he has been continuously delirious, though every now and again he will have an episode of lucidity.  She reports that he was treated with IV Haldol and developed some tremors and perhaps extrapyramidal side effects at [facility].  More recently he has been treated with Seroquel.  He has actually been on Seroquel since [facility] and that has had very little benefit.  The concern has always been his elevated QTc, but happily this has normalized, so today we have more options for treating delirium.      MENTAL STATUS EXAMINATION:  On my interview, the patient was completely disoriented and talking nonsense.  His mood was difficult to assess.  His affect was labile.  His speech was sometimes incoherent but often incoherent.  It was not goal oriented.  Thought processes were not logical and linear,  and he was quite tangential.  Content of thought was without overt hallucinations or suicidal ideation.  Recent and remote memory, concentration and fund of knowledge were all quite impaired.  Use of language was impaired but closer to normal.  He was alert but totally disoriented.  Insight and judgment are very poor.  Muscle strength and tone are very difficult to assess as he is in restraints.        Recent vital signs include a temperature of 99.1, heart rate of 81, respiration rate of 16, blood pressure 82/58 and oxygen saturation 100%.      I had an opportunity to discuss this case with the treatment team.  The belief is that he continues to have infection that is the likely cause of his delirium.  They are attempting to treat this aggressively.      ASSESSMENT:  Delirium, likely secondary to infection.      RECOMMENDATION:   1.  Discontinue Seroquel.   2.  Zyprexa 5 mg b.i.d. plus 5 mg p.r.n. agitation.  The Zyprexa can be titrated appropriately to a total dose of 30 mg in 24 hours.  During any increase of Zyprexa, we must be mindful of his QTc.  If 30 mg of Zyprexa is not helpful, we may have to return to intravenous Haldol.  This would likely require intravenous anticholinergics as well.  For now, I would like to try simply using Zyprexa.         SUZY HARRIS MD             D: 2017 14:33   T: 2017 22:23   MT:       Name:     ASHTYN STROUD   MRN:      [id number]        Account:       [id number]   :      1955           Consult Date:  2017      Document: [id number]

## 2017-12-22 NOTE — PROGRESS NOTES
[facility]  CARDIOLOGY DAILY PROGRESS NOTE    Interval History:   Very agitated overnight    Objective  Temp:  [97.2  F (36.2  C)-98.8  F (37.1  C)] 98  F (36.7  C)  Heart Rate:  [] 92  Resp:  [12-24] 18  BP: (190)/(90) 190/90  MAP:  [61 mmHg-131 mmHg] 96 mmHg  Arterial Line BP: ()/() 151/70  SpO2:  [64 %-100 %] 99 %  Intake/Output Summary (Last 24 hours) at 12/22/17 1754  Last data filed at 12/22/17 1700   Gross per 24 hour   Intake          2277.63 ml   Output             2560 ml   Net          -282.37 ml     Wt Readings from Last 5 Encounters:   12/22/17 84.6 kg (186 lb 8.2 oz)   12/15/17 83 kg (183 lb)   11/02/17 88.3 kg (194 lb 10.7 oz)   02/06/17 91.6 kg (202 lb)   01/09/17 87.5 kg (193 lb)     GEN: pleasant, no acute distress  HEENT: no icterus  CV: RRR, normal s1/s2, no murmurs/rubs/s3/s4, no heave. JVP at 8-9 cm.   CHEST: on 3L NC, normal work of breath, clear to ausculation bilaterally, no rales or wheezing  ABD: soft, non-tender, normal active bowel sounds  EXTR: pulses 2+ and equal. No clubbing, cyanosis or edema.   NEURO: alert oriented, speech fluent/appropriate, motor grossly nonfocal    Current Medications    famotidine  20 mg Oral or Feeding Tube Daily     ceFEPIme (MAXIPIME) IV  2 g Intravenous Q8H     OLANZapine  7.5 mg Oral BID     polyethylene glycol  17 g Oral Daily     heparin  5,000 Units Subcutaneous Q8H     venlafaxine  50 mg Oral BID     multivitamins with minerals  15 mL Per Feeding Tube Daily     sodium chloride (PF)  3 mL Intracatheter Q8H     insulin aspart   Subcutaneous TID w/meals     senna-docusate  1-2 tablet Oral BID     mupirocin  0.5 g Both Nostrils BID     acetylcysteine  2 mL Nebulization Q4H     mirtazapine  30 mg Oral At Bedtime     rifampin  300 mg Oral or Feeding Tube BID     ketotifen  1 drop Left Eye TID     artificial tears   Ophthalmic At Bedtime     aspirin  81 mg Oral or Feeding Tube Daily     atorvastatin  40 mg Oral  Daily       IV fluid REPLACEMENT ONLY       IV fluid REPLACEMENT ONLY       insulin (regular) Stopped (12/21/17 0100)     Reason beta blocker order not selected       dextrose 5% and 0.45% NaCl + KCl 20 mEq/L 75 mL/hr at 12/21/17 2018     EPINEPHrine IV infusion ADULT Stopped (12/21/17 1900)     dexmedetomidine Stopped (12/22/17 1700)     - MEDICATION INSTRUCTIONS -       Lab Values  BMP  Recent Labs  Lab 12/22/17  1626 12/22/17  0401 12/21/17  1955 12/21/17  0154  12/20/17  0412 12/20/17  0002   NA  --  145*  --  143  --  144 144   POTASSIUM 3.7 3.8 3.6 3.9  < > 4.2 3.8   CHLORIDE  --  110*  --  109  --  109 109   IZABELLA  --  8.4*  --  8.0*  --  8.2* 7.8*   CO2  --  29  --  27  --  25 26   BUN  --  26  --  16  --  13 11   CR  --  1.63*  --  1.63*  --  1.33* 1.22   GLC  --  112*  --  105*  --  160* 179*   < > = values in this interval not displayed.  LFTs  Recent Labs  Lab 12/18/17  0359 12/17/17 2024 12/17/17  0019   ALKPHOS 92 94 102   AST 32 29 33   ALT 30 32 34   BILITOTAL 1.3 1.0 1.3   PROTTOTAL 7.0 7.0 7.4   ALBUMIN 1.5* 1.6* 1.6*      CBC  Recent Labs  Lab 12/22/17  1626 12/22/17  0401 12/21/17  0154  12/20/17  0412 12/20/17  0002 12/19/17  1806   WBC  --   --  13.4*  --  15.5* 16.3* 16.1*   RBC  --   --  2.72*  --  2.62* 2.28* 3.04*   HGB 7.9* 7.9* 8.2*  < > 8.0* 7.0* 9.4*   HCT  --   --  25.0*  --  24.0* 21.4* 29.0*   MCV  --   --  92  --  92 94 95   MCH  --   --  30.1  --  30.5 30.7 30.9   MCHC  --   --  32.8  --  33.3 32.7 32.4   RDW  --   --  19.0*  --  18.0* 17.9* 17.4*   PLT  --  210 250  --  358 338 303   < > = values in this interval not displayed.  INR  Recent Labs  Lab 12/21/17  0154 12/20/17  0748 12/19/17  1806 12/19/17  1653   INR 1.35* 1.29* 0.90 0.81*     TpnInvalid input(s): TPN      Studies  EKG: low voltage, sinus rhythm     Transthoracic echocardiogram:   Left ventricular wall thickness is normal. Mild left ventricular dilation is present. The Ejection Fraction is estimated at 30-35%.  Diastolic function not assessed due to tachycardia. Severe diffuse hypokinesis is present.     The right ventricle is normal size. Global right ventricular function is mildly reduced.  Both atria appear normal.  Trace mitral insufficiency is present.  The tricuspid valve is normal.     Vessels  The patient is post bio-Bentall - aortic root replacement with a 30 mm graft  and AVR with a Trifecta valve. Today, there is circumferential free space  noted surrounding the aortic graft, dehiscence and rocking of the graft from  the native aorta and severe AI in the space surrounding the aortic graft  (perivalvular/perigraft). The circumferential free space was present on the  previous studies of October 2017 but was filled with echodense material and  the graft was not independently mobile, and there was no significant AI. The  prosthetic aortic valve leaflets are not well visualized today.     No pericardial effusion is present.     Compared to Previous Study  This study was compared with the study from 10/06/2017 . Since the prior  study, the LV function has worsened and the aortic graft appears to be  dehisced, as described above.        Assessment and Recommendation:  Cricket Miguel is a 62 year old male admitted with worsening dyspnea, thought to have acute severe AI on echocardiogram.  Pertinent history of aortic stenosis and ascending aortic aneurysm s/p AVR and root replacement April 2016.  Had complications of moises aortic abscess, endocarditis, and septic emboli to his brain.  He underwent redo sternotomy with aortic root and valve homograft 12/19.  Heart failure service is being consulted for newly reduced EF 20-25%.     Troponin mildly elevated (2), right is fine in setting of just having major surgery.  Would be much higher if ischemic induced HF.    This likely is from the severe AI and recent surgery.  I imagine his EF should improve over time if he is able to maintain euvolemia and be on evidence based  therapies. Can start isordil/hydralazine combo 20/25.  If his pressures can tolerate, than can start coreg or toprol XL.  I would hold off further diuresis for now     I have discussed the above with Dr. Browne.     Thank you for consulting the cardiovascular services at the [facility]. Please do not hesitate to call us with any questions.      Pardeep Cruz MD PGY4  Cardiology Fellow  [phone]        Pt's condition and care plan discussed with fellow but patient not seen personally by me today.    Shakila Browne MD  Section Head - Advanced Heart Failure, Transplantation and Mechanical Circulatory Support  Co-Director - [facility]  Associate Professor of Medicine, [facility]

## 2017-12-22 NOTE — PLAN OF CARE
Problem: Patient Care Overview  Goal: Plan of Care/Patient Progress Review  Outcome: Improving    No acute events this shift.     Neuro: Agitated, restless. Combative at times. Verbally abusive toward family and staff. Pt calm and cooperative late this evening.   CV: SR 60-80s. MAP . Epi off at shift change.   -CVP 8-9  -PA 30/12 - 40-16  -SvO2 44-60  -CI 1.3 - 3.5  - - 1200  Pulm: Lungs coarse/dim. 2L oxymask with good sats. Encourage pulm hygiene. Thick, creamy secretions.   GI: TF via G tube at 45. Goal = 65. Large BM today.   : 180 mL/hr UOP. Espinoza in place.     Mediastinal CTs removed. Pleural CT x2. Sanguinous output.     Drips:   -Epi (off) [0 to 0.03]  -Precedex 0.3

## 2017-12-22 NOTE — PLAN OF CARE
Problem: Patient Care Overview  Goal: Plan of Care/Patient Progress Review  Outcome: Improving  Patient up to chair with therapy. Requires ceiling lift. On / off Precedex for agitation and anxiety. Hypotension with Predecex. Dilaudid given for pain. Patient is alert and oriented to self, knows the month, not the year, and does not know situation / place. PERRLA 2mm. Strength is weaker on the right side. Able to move all extremities. Afebrile. SR 60 - 90. Pressures tenuous. With agitation, pressures as high as 200 / 90. Normally 110 - 140 / 50 - 70 MAPs (60 - 80). Pulses 2/2. Lungs are coarse and diminished, but clear after NT suctioning and coughing/deep breathing. 2L Oxy Plus mask, with oxygen saturations 95 - 100 percent. Hypoactive bowel sounds. Smears on chux x2. Urine output 30 - 60 ml/hr. Precedex now on at 0.2 mcg/kg/min. Chest tube output minimal. Dressing over mediastinal chest tube sites changed for excess drainage.

## 2017-12-22 NOTE — PROGRESS NOTES
CVICU PROGRESS NOTE: 12/22/2017  ASSESSMENT: Cricket Miguel is a 62 year old male with hx of COPD, HTN, HLD, CAD, heart failure with preserved EF, aortic stenosis and ascending aortic aneurysm s/p aortic valve replacement and aortic root replacement (April 2016) c/b moises-aortic abscess, endocarditis, cerebral septic emboli and severe AI s/p redo-sternotomy aortic root and valve homograft 12/19.       PLAN:   Neuro/ pain/ sedation: Hx of multifocal acute infarctions involving the left parietotemporal lobes, left cerebral hemisphere and right occipital lobe presumed to be septic emboli. Depression, mild delirium, followed by psych.  -Monitor neurological status. Notify the MD for any acute changes in exam.  -dilaudid PRN.  -precedex for agitation  - On psych rec started olanzapine. Controlled agitation better last night. Serial EKGs to evaluate QT interval.     Pulmonary care: extubated 12/20  - On facemask 2 LPM saturation 100%.  - Not participating in pulmonary toilet, not coughing or actively clearing secretions due to delirium and inability to follow commands.  - Continue to encourage pulmonary toilet as able. RT called to do acapella therapy q4.     Cardiovascular:  HTN, HLD, CAD, AS and aortic aneurysm s/p bentall c/b endocarditis and severe AI s/p aortic root/valve homograft 12/19.  - Minimal Epinephrine to maintain MAP>65  -Keep SBP<120  -Aggressively treat pain and agitation to control blood pressure. Hydralazine not effective.  -ASA 81mg and atorvastatin.  -Echo shows EF 20-25% baseline 30%  -As per cards trend troponin levels to rule out new onset ischemia induced cardiomyopathy.     GI care: PEG  -famotidine  -senna/colace  - Added miralax     Fluids/ Electrolytes/ Nutrition:   -PRN electrolyte replacement  -No indication for parenteral nutrition.     Renal/ Fluid Balance: Urine output is adequate so far.  -Espinoza catheter for strict intake and output.  - monitor BMP, creatinine  - Gave lasix 40mg x1 for  volume overload 12/21. Diuresed sufficiently. Keep Is & Os even.     Endocrine:   - insulin as needed.     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 Hx of MSSA septic arthritis and bacteremia. Hx of multifocal acute infarctions involving the left parietotemporal lobes, left cerebral hemisphere and right occipital lobe presumed to be septic emboli. ID following.   -New cultures BAL growing pseudomonas aeruginosa. F/u ID for Abx recs.  -F/u surgical tissue cultures     Heme: acute blood loss anemia  - CBC stable.     Prophylaxis:    -Mechanical prophylaxis for DVT.   -start SC heparin.      Lines/ tubes/ drains:  -R IJ CVC, L radial arterial line, R triple lumen PICC, garza, PIVs. D/C swan.     Disposition:  -CVICU.     Patient seen, findings and plan discussed with CVICU staff Susan Boyce MD  CA-3/PGY-4  12/22/2017  10:14 AM    - - - - - - - - - - - - - - - - - - - - - - - - - - - - - - - - - - - - - - - - - - - - - - - - - - - - - - - - - - - - - - - - - - - - - - - -   Overnight:   Less agitation last night with olanzapine. Increased blood pressures when agitated.    PHYSICAL EXAMINATION:  Temp:  [97.2  F (36.2  C)-99.1  F (37.3  C)] 97.5  F (36.4  C)  Heart Rate:  [] 79  Resp:  [12-24] 22  BP: (190)/(90) 190/90  MAP:  [52 mmHg-131 mmHg] 73 mmHg  Arterial Line BP: ()/() 116/53  SpO2:  [64 %-100 %] 98 %  General: inappropriate affect. Belligerent. Not oriented x 3.  HEENT: Normocephalic, atraumatic. Left sided facial droop (old)  Chest wall: Symmetric thorax. Median sternotomy dressing c/d/i.   Respiratory: Equal BS bilateraly. Copious secretions. Ineffective cough.  Cardiovascular: RRR, chest tubes draining serosanguinous fluid.   Gastrointestinal: Abdomen soft, non-distended.  Genitourinary: Garza catheter in place.   Extremities: no peripheral LE edema. Right sided weakness UE.  Skin: As noted above. No rashes or lesions appreciated.   Lines: R  IJ CVC, L radial arterial line, R PICC      Recent Labs  Lab 12/21/17  0154 12/20/17  2056 12/20/17  1730 12/20/17  0748   PH 7.40 7.41 7.44 7.43   PCO2 42 40 34* 37   PO2 146* 160* 107* 281*   HCO3 26 25 23 25     Complete Blood Count     Recent Labs  Lab 12/22/17  0401 12/21/17  0154 12/20/17  1416 12/20/17  0748 12/20/17  0412 12/20/17  0002 12/19/17 1806   WBC  --  13.4*  --   --  15.5* 16.3* 16.1*   HGB 7.9* 8.2* 8.2* 8.1* 8.0* 7.0* 9.4*    250  --   --  358 338 303     Basic Metabolic Panel    Recent Labs  Lab 12/22/17  0401 12/21/17 1955 12/21/17  0154 12/20/17  0748 12/20/17  0412 12/20/17  0002   *  --  143  --  144 144   POTASSIUM 3.8 3.6 3.9 4.3 4.2 3.8   CHLORIDE 110*  --  109  --  109 109   CO2 29  --  27  --  25 26   BUN 26  --  16  --  13 11   CR 1.63*  --  1.63*  --  1.33* 1.22   *  --  105*  --  160* 179*     Liver Function Tests    Recent Labs  Lab 12/21/17  0154 12/20/17  0748 12/19/17  1806 12/19/17  1653  12/18/17  0359 12/17/17 2024 12/17/17  0019   AST  --   --   --   --   --  32 29 33   ALT  --   --   --   --   --  30 32 34   ALKPHOS  --   --   --   --   --  92 94 102   BILITOTAL  --   --   --   --   --  1.3 1.0 1.3   ALBUMIN  --   --   --   --   --  1.5* 1.6* 1.6*   INR 1.35* 1.29* 0.90 0.81*  < > 1.12 1.10 1.06   < > = values in this interval not displayed.      Recent Labs  Lab 12/21/17  0154 12/20/17  0748 12/19/17  1806 12/19/17  1653 12/19/17  1515   INR 1.35* 1.29* 0.90 0.81* 1.56*   PTT  --  39* 52* 43* 37

## 2017-12-22 NOTE — PLAN OF CARE
Problem: Patient Care Overview  Goal: Plan of Care/Patient Progress Review  4E: Holding - Following discussion with OT, pt with varying agitation and has not demonstrated enough consistency to warrant initiation of PT at this time. Will continue to monitor status and initiate as appropriate.

## 2017-12-22 NOTE — PROGRESS NOTES
CARDIOTHORACIC SURGERY PROGRESS NOTE: 12/22/2017    ASSESSMENT: Cricket Miguel is a 62 year old male with hx of COPD, HTN, HLD, CAD, heart failure with preserved EF, aortic stenosis and ascending aortic aneurysm s/p aortic valve replacement and aortic root replacement (April 2016) c/b moises-aortic abscess, endocarditis, cerebral septic emboli and severe AI s/p redo-sternotomy aortic root and valve homograft 12/19.       PLAN:   Neuro/ pain/ sedation: Hx of multifocal acute infarctions involving the left parietotemporal lobes, left cerebral hemisphere and right occipital lobe presumed to be septic emboli. Depression, mild delirium, followed by psych.  -Monitor neurological status. Notify the MD for any acute changes in exam.  -dilaudid PRN.  -precedex for agitation  - On psych rec started olanzapine. Controlled agitation better last night. Serial EKGs to evaluate QT interval.     Pulmonary care: extubated 12/20  - On facemask 2 LPM saturation 100%.  - Not participating in pulmonary toilet, not coughing or actively clearing secretions due to delirium and inability to follow commands.  - Continue to encourage pulmonary toilet as able. RT called to do acapella therapy q4.     Cardiovascular:  HTN, HLD, CAD, AS and aortic aneurysm s/p bentall c/b endocarditis and severe AI s/p aortic root/valve homograft 12/19.  - Minimal Epinephrine to maintain MAP>65  -Keep SBP<120  -Aggressively treat pain and agitation to control blood pressure. Hydralazine not effective.  -ASA 81mg and atorvastatin.  -Echo shows EF 20-25% baseline 30%  -As per cards trend troponin levels to rule out new onset ischemia induced cardiomyopathy.     GI care: PEG  -famotidine  -senna/colace  - Added miralax     Fluids/ Electrolytes/ Nutrition:   -PRN electrolyte replacement  -No indication for parenteral nutrition.     Renal/ Fluid Balance: Urine output is adequate so far.  -Espinoza catheter for strict intake and output.  - monitor BMP, creatinine  - Gave  lasix 40mg x1 for volume overload 12/21. Diuresed sufficiently. Keep Is & Os even.     Endocrine:   - insulin as needed.     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 Hx of MSSA septic arthritis and bacteremia. Hx of multifocal acute infarctions involving the left parietotemporal lobes, left cerebral hemisphere and right occipital lobe presumed to be septic emboli. ID following.   -New cultures BAL growing pseudomonas aeruginosa. F/u ID for Abx recs.  -F/u surgical tissue cultures     Heme: acute blood loss anemia  - CBC stable.     Prophylaxis:    -Mechanical prophylaxis for DVT.   -start SC heparin.      Lines/ tubes/ drains:  -R IJ CVC, L radial arterial line, R triple lumen PICC, garza, PIVs. D/C swan.     Disposition:  -CVICU.     Patient seen, findings and plan discussed with CVICU staff Susan Rodriguez MD  PGY-3, General Surgery    - - - - - - - - - - - - - - - - - - - - - - - - - - - - - - - - - - - - - - - - - - - - - - - - - - - - - - - - - - - - - - - - - - - - - - - -   Overnight:   Less agitation last night with olanzapine. Increased blood pressures when agitated.    PHYSICAL EXAMINATION:  Temp:  [97.2  F (36.2  C)-98.8  F (37.1  C)] 97.5  F (36.4  C)  Heart Rate:  [] 72  Resp:  [12-24] 20  BP: (190)/(90) 190/90  MAP:  [61 mmHg-131 mmHg] 70 mmHg  Arterial Line BP: ()/() 102/58  SpO2:  [64 %-100 %] 95 %  General: inappropriate affect. Belligerent. Not oriented x 3.  HEENT: Normocephalic, atraumatic. Left sided facial droop (old)  Chest wall: Symmetric thorax. Median sternotomy dressing c/d/i.   Respiratory: Equal BS bilateraly. Copious secretions. Ineffective cough.  Cardiovascular: RRR, chest tubes draining serosanguinous fluid.   Gastrointestinal: Abdomen soft, non-distended.  Genitourinary: Garza catheter in place.   Extremities: no peripheral LE edema. Right sided weakness UE.  Skin: As noted above. No rashes or lesions appreciated.    Lines: R IJ CVC, L radial arterial line, R PICC      Recent Labs  Lab 12/22/17  1354 12/21/17  0154 12/20/17  2056 12/20/17  1730   PH 7.43 7.40 7.41 7.44   PCO2 41 42 40 34*   PO2 130* 146* 160* 107*   HCO3 28 26 25 23     Complete Blood Count     Recent Labs  Lab 12/22/17  0401 12/21/17  0154 12/20/17  1416 12/20/17  0748 12/20/17  0412 12/20/17  0002 12/19/17 1806   WBC  --  13.4*  --   --  15.5* 16.3* 16.1*   HGB 7.9* 8.2* 8.2* 8.1* 8.0* 7.0* 9.4*    250  --   --  358 338 303     Basic Metabolic Panel    Recent Labs  Lab 12/22/17  0401 12/21/17  1955 12/21/17  0154 12/20/17  0748 12/20/17  0412 12/20/17  0002   *  --  143  --  144 144   POTASSIUM 3.8 3.6 3.9 4.3 4.2 3.8   CHLORIDE 110*  --  109  --  109 109   CO2 29  --  27  --  25 26   BUN 26  --  16  --  13 11   CR 1.63*  --  1.63*  --  1.33* 1.22   *  --  105*  --  160* 179*     Liver Function Tests    Recent Labs  Lab 12/21/17  0154 12/20/17  0748 12/19/17  1806 12/19/17  1653  12/18/17  0359 12/17/17 2024 12/17/17  0019   AST  --   --   --   --   --  32 29 33   ALT  --   --   --   --   --  30 32 34   ALKPHOS  --   --   --   --   --  92 94 102   BILITOTAL  --   --   --   --   --  1.3 1.0 1.3   ALBUMIN  --   --   --   --   --  1.5* 1.6* 1.6*   INR 1.35* 1.29* 0.90 0.81*  < > 1.12 1.10 1.06   < > = values in this interval not displayed.      Recent Labs  Lab 12/21/17  0154 12/20/17  0748 12/19/17  1806 12/19/17  1653 12/19/17  1515   INR 1.35* 1.29* 0.90 0.81* 1.56*   PTT  --  39* 52* 43* 37     Recent Results (from the past 24 hour(s))   XR Chest Port 1 View    Narrative    Exam: XR CHEST PORT 1 VW, 12/22/2017 10:39 AM    Indication: chest tubes;     Comparison: 12/21/2017    Findings:   There is a single semiupright portable view of the chest. Interval  removal of the right IJ [place]-Amor. The right IJ [place]-Amor sheath  projects over the superior SVC. Right sided PICC terminating in the  subclavian artery. There is an interval removal  of a mediastinal  drain. The right and left chest tubes are in stable position. The  cardiomediastinal silhouette is enlarged, stable from prior. The upper  abdomen is unremarkable. There is an interval decrease of the  bilateral sided pleural effusion. No pneumothorax.      Impression    Impression:   1. Interval removal of mediastinal drain and Glas[place]-Amor catheter.  Support devices are otherwise stable as listed above.  2. Interval decrease of bilateral pleural effusions.    I have personally reviewed the examination and initial interpretation  and I agree with the findings.    SAMANTHA CHRISTOPHER MD

## 2017-12-22 NOTE — PLAN OF CARE
Problem: Patient Care Overview  Goal: Plan of Care/Patient Progress Review  Discharge Planner SLP   Patient plan for discharge: Pt did not state  Current status: Pt able to participate in session this date. Pt requiring intermittent NT suctioning as he won't cough due to pain. Recommend advancing pt to full liquid diet. Sit pt upright for po intake. Encourage small bites/sips and slow rate. Pt was tolerating regular diet with thin liquids prior to admission. He had advanced following his stroke in October of this year. SLP to follow per POC.   Barriers to return to prior living situation: Dysphagia, aphasia, increased dependence  Recommendations for discharge: TCU  Rationale for recommendations:  Pt will benefit from ongoing SLP services following acute care to address deficits.       Entered by: Celena Moran 12/22/2017 3:53 PM

## 2017-12-22 NOTE — PROGRESS NOTES
Social Work: Assessment with Discharge Plan    Patient Name:  Cricket Miguel  :  1955  Age:  62 year old  MRN:  [id number]  Risk/Complexity Score:  Filed Complexity Screen Score: 13  Completed assessment with:  Pt/son, chart review, attended rounds, RN CC     Presenting Information   Reason for Referral:  SW auto case finding  Date of Intake:  2017  Referral Source:  Chart Review  Decision Maker:  Patient at baseline  Alternate Decision Maker:  Pt's children would be alternates, per [facility] policy.   Health Care Directive:  None filed. Pt not appropriate to complete at this time.  Living Situation:  House  Previous Functional Status:  Pt recently discharged from [facility] and per son, required a lot of assistance at home in the few weeks he was home.   Patient and family understanding of hospitalization:  Restorative   Cultural/Language/Spiritual Considerations:  English-speaking   Adjustment to Illness:  Pt has been agitated, per RN, but seemed to be in good spirits during SW visit. Unclear how oriented he was, though. Pt's son appeared to be coping well and wants to take things slowly so pt doesn't back track again.    Physical Health  Reason for Admission:  No diagnosis found.  Services Needed/Recommended:  TCU    Mental Health/Chemical Dependency  Diagnosis:  Depression diagnosis in chart, but did not address.   Support/Services in Place:  Unknown   Services Needed/Recommended:  TBD     Support System  Significant relationship at present time:  Pt has SO, Meghna.   Family of origin is available for support:  Yes, pt's children are available for support.   Other support available:  Unknown   Gaps in support system:  None identified at this time.   Patient is caregiver to:  None     Provider Information   Primary Care Physician:  Dipak Gordillo   [phone]   Clinic:  [address] [place] [address] / [facility] [address]      :  None identified     Financial   Income Source:  Did not  address.   Financial Concerns:  Did not address.   Insurance:    Payor/Plan Subscriber Name Rel Member # Group #   [employer] -  O* ASHTYN STROUD  [id number]       [id number]       Discharge Plan   Patient and family discharge goal:  Pt's son thinks that pt needs rehab at discharge before returning home.   Provided education on discharge plan:  Started discussion of TCU.   Patient agreeable to discharge plan:  Did not seem oriented during SW visit.   A list of Medicare Certified Facilities was provided to the patient and/or family to encourage patient choice. Patient's choices for facility are:  Provided list from Medicare.gov  Will NH provide Skilled rehabilitation or complex medical:  YES  General information regarding anticipated insurance coverage and possible out of pocket cost was discussed. Patient and patient's family are aware patient may incur the cost of transportation to the facility, pending insurance payment: NO  Barriers to discharge:  Medical stability, pt also has 1:1 sitter currently.     Discharge Recommendations   Anticipated Disposition:  TCU  Transportation Needs:  TBD   Name of Transportation Company and Phone:  TBD     Additional comments   Met with pt and son, Christian, at the bedside. Introduced self and role of SW. Obtained info above. Started discussion of TCU recommendation at d/c. Discussed briefly how this is different than pt's stay at [facility] (LTACH), which pt likely won't need this time. Provided list of TCUs from Medicare.[employer] in order of distance from pt and family's home zip code in [place]. Pt's son stated that he and his siblings, along with pt's SO, Meghna, work together as a team to help determine discharge plans. Provided supportive feedback for this. He hopes to take things slowly with pt this time, as he wants him to be stronger and ready to come home, which Christian doesn't feel pt was when he left [facility]. Encouraged him to look over TCU list and discuss with  family. Noted that this writer or floor SW (if pt transfers over the holiday wknd) will follow up next week or as we have a better idea of discharge plans. Christian was agreeable to this; pleasant and receptive to SW.     KAMAR Tidwell, [facility]  ICU Float   Pager: [phone]  [email or web address]     NO LETTER

## 2017-12-23 ENCOUNTER — APPOINTMENT (OUTPATIENT)
Dept: SPEECH THERAPY | Facility: CLINIC | Age: 62
DRG: 219 | End: 2017-12-23
Attending: INTERNAL MEDICINE
Payer: COMMERCIAL

## 2017-12-23 ENCOUNTER — APPOINTMENT (OUTPATIENT)
Dept: GENERAL RADIOLOGY | Facility: CLINIC | Age: 62
DRG: 219 | End: 2017-12-23
Attending: SURGERY
Payer: COMMERCIAL

## 2017-12-23 LAB
ANION GAP SERPL CALCULATED.3IONS-SCNC: 7 MMOL/L (ref 3–14)
BACTERIA SPEC CULT: ABNORMAL
BACTERIA SPEC CULT: ABNORMAL
BACTERIA SPEC CULT: NO GROWTH
BACTERIA SPEC CULT: NO GROWTH
BUN SERPL-MCNC: 27 MG/DL (ref 7–30)
CALCIUM SERPL-MCNC: 8.4 MG/DL (ref 8.5–10.1)
CHLORIDE SERPL-SCNC: 110 MMOL/L (ref 94–109)
CO2 SERPL-SCNC: 28 MMOL/L (ref 20–32)
CREAT SERPL-MCNC: 1.17 MG/DL (ref 0.66–1.25)
ERYTHROCYTE [DISTWIDTH] IN BLOOD BY AUTOMATED COUNT: 18.3 % (ref 10–15)
GFR SERPL CREATININE-BSD FRML MDRD: 63 ML/MIN/1.7M2
GLUCOSE BLDC GLUCOMTR-MCNC: 110 MG/DL (ref 70–99)
GLUCOSE BLDC GLUCOMTR-MCNC: 126 MG/DL (ref 70–99)
GLUCOSE BLDC GLUCOMTR-MCNC: 96 MG/DL (ref 70–99)
GLUCOSE SERPL-MCNC: 121 MG/DL (ref 70–99)
HCT VFR BLD AUTO: 25.5 % (ref 40–53)
HGB BLD-MCNC: 7.9 G/DL (ref 13.3–17.7)
MAGNESIUM SERPL-MCNC: 2 MG/DL (ref 1.6–2.3)
MCH RBC QN AUTO: 29.9 PG (ref 26.5–33)
MCHC RBC AUTO-ENTMCNC: 31 G/DL (ref 31.5–36.5)
MCV RBC AUTO: 97 FL (ref 78–100)
PLATELET # BLD AUTO: 270 10E9/L (ref 150–450)
POTASSIUM SERPL-SCNC: 3.8 MMOL/L (ref 3.4–5.3)
RBC # BLD AUTO: 2.64 10E12/L (ref 4.4–5.9)
SODIUM SERPL-SCNC: 146 MMOL/L (ref 133–144)
SPECIMEN SOURCE: ABNORMAL
SPECIMEN SOURCE: NORMAL
SPECIMEN SOURCE: NORMAL
WBC # BLD AUTO: 11.1 10E9/L (ref 4–11)

## 2017-12-23 PROCEDURE — 25000132 ZZH RX MED GY IP 250 OP 250 PS 637

## 2017-12-23 PROCEDURE — 94640 AIRWAY INHALATION TREATMENT: CPT

## 2017-12-23 PROCEDURE — 00000146 ZZHCL STATISTIC GLUCOSE BY METER IP

## 2017-12-23 PROCEDURE — 94640 AIRWAY INHALATION TREATMENT: CPT | Mod: 76

## 2017-12-23 PROCEDURE — 25000128 H RX IP 250 OP 636: Performed by: SURGERY

## 2017-12-23 PROCEDURE — 25000132 ZZH RX MED GY IP 250 OP 250 PS 637: Performed by: SURGERY

## 2017-12-23 PROCEDURE — 25000132 ZZH RX MED GY IP 250 OP 250 PS 637: Performed by: ANESTHESIOLOGY

## 2017-12-23 PROCEDURE — 40000275 ZZH STATISTIC RCP TIME EA 10 MIN

## 2017-12-23 PROCEDURE — 12000006 ZZH R&B IMCU INTERMEDIATE UMMC

## 2017-12-23 PROCEDURE — 25000132 ZZH RX MED GY IP 250 OP 250 PS 637: Performed by: PHYSICIAN ASSISTANT

## 2017-12-23 PROCEDURE — 27210437 ZZH NUTRITION PRODUCT SEMIELEM INTERMED LITER

## 2017-12-23 PROCEDURE — 25000128 H RX IP 250 OP 636: Performed by: THORACIC SURGERY (CARDIOTHORACIC VASCULAR SURGERY)

## 2017-12-23 PROCEDURE — 80048 BASIC METABOLIC PNL TOTAL CA: CPT | Performed by: THORACIC SURGERY (CARDIOTHORACIC VASCULAR SURGERY)

## 2017-12-23 PROCEDURE — 25000132 ZZH RX MED GY IP 250 OP 250 PS 637: Performed by: THORACIC SURGERY (CARDIOTHORACIC VASCULAR SURGERY)

## 2017-12-23 PROCEDURE — 40000196 ZZH STATISTIC RAPCV CVP MONITORING

## 2017-12-23 PROCEDURE — 40000014 ZZH STATISTIC ARTERIAL MONITORING DAILY

## 2017-12-23 PROCEDURE — 25000125 ZZHC RX 250: Performed by: THORACIC SURGERY (CARDIOTHORACIC VASCULAR SURGERY)

## 2017-12-23 PROCEDURE — 92526 ORAL FUNCTION THERAPY: CPT | Mod: GN

## 2017-12-23 PROCEDURE — 25000125 ZZHC RX 250: Performed by: STUDENT IN AN ORGANIZED HEALTH CARE EDUCATION/TRAINING PROGRAM

## 2017-12-23 PROCEDURE — 25000128 H RX IP 250 OP 636

## 2017-12-23 PROCEDURE — 25000125 ZZHC RX 250: Performed by: SURGERY

## 2017-12-23 PROCEDURE — 85027 COMPLETE CBC AUTOMATED: CPT | Performed by: THORACIC SURGERY (CARDIOTHORACIC VASCULAR SURGERY)

## 2017-12-23 PROCEDURE — 25000132 ZZH RX MED GY IP 250 OP 250 PS 637: Performed by: INTERNAL MEDICINE

## 2017-12-23 PROCEDURE — 40000225 ZZH STATISTIC SLP WARD VISIT

## 2017-12-23 PROCEDURE — 25000128 H RX IP 250 OP 636: Performed by: PHYSICIAN ASSISTANT

## 2017-12-23 PROCEDURE — 40000048 ZZH STATISTIC DAILY SWAN MONITORING

## 2017-12-23 PROCEDURE — 40000986 XR CHEST PORT 1 VW

## 2017-12-23 PROCEDURE — 25000132 ZZH RX MED GY IP 250 OP 250 PS 637: Performed by: HOSPITALIST

## 2017-12-23 PROCEDURE — 83735 ASSAY OF MAGNESIUM: CPT | Performed by: THORACIC SURGERY (CARDIOTHORACIC VASCULAR SURGERY)

## 2017-12-23 PROCEDURE — 99232 SBSQ HOSP IP/OBS MODERATE 35: CPT | Mod: GC | Performed by: ANESTHESIOLOGY

## 2017-12-23 RX ORDER — ALBUTEROL SULFATE 0.83 MG/ML
2.5 SOLUTION RESPIRATORY (INHALATION) EVERY 4 HOURS
Status: DISCONTINUED | OUTPATIENT
Start: 2017-12-23 | End: 2017-12-28

## 2017-12-23 RX ORDER — OLANZAPINE 5 MG/1
5 TABLET ORAL 2 TIMES DAILY
Status: DISCONTINUED | OUTPATIENT
Start: 2017-12-23 | End: 2017-12-27

## 2017-12-23 RX ORDER — FUROSEMIDE 10 MG/ML
40 INJECTION INTRAMUSCULAR; INTRAVENOUS ONCE
Status: COMPLETED | OUTPATIENT
Start: 2017-12-23 | End: 2017-12-23

## 2017-12-23 RX ADMIN — ACETYLCYSTEINE 2 ML: 100 SOLUTION ORAL; RESPIRATORY (INHALATION) at 20:53

## 2017-12-23 RX ADMIN — POTASSIUM CHLORIDE 20 MEQ: 1.5 POWDER, FOR SOLUTION ORAL at 06:18

## 2017-12-23 RX ADMIN — KETOTIFEN FUMARATE 1 DROP: 0.25 SOLUTION/ DROPS OPHTHALMIC at 08:15

## 2017-12-23 RX ADMIN — ALBUTEROL SULFATE 2.5 MG: 2.5 SOLUTION RESPIRATORY (INHALATION) at 20:53

## 2017-12-23 RX ADMIN — ATORVASTATIN CALCIUM 40 MG: 40 TABLET, FILM COATED ORAL at 08:12

## 2017-12-23 RX ADMIN — Medication 300 MG: at 08:14

## 2017-12-23 RX ADMIN — HEPARIN SODIUM 5000 UNITS: 5000 INJECTION, SOLUTION INTRAVENOUS; SUBCUTANEOUS at 02:58

## 2017-12-23 RX ADMIN — MIRTAZAPINE 30 MG: 15 TABLET, FILM COATED ORAL at 21:41

## 2017-12-23 RX ADMIN — KETOTIFEN FUMARATE 1 DROP: 0.25 SOLUTION/ DROPS OPHTHALMIC at 15:07

## 2017-12-23 RX ADMIN — FAMOTIDINE 20 MG: 40 POWDER, FOR SUSPENSION ORAL at 08:18

## 2017-12-23 RX ADMIN — OLANZAPINE 7.5 MG: 5 TABLET, FILM COATED ORAL at 08:12

## 2017-12-23 RX ADMIN — SENNOSIDES AND DOCUSATE SODIUM 1 TABLET: 8.6; 5 TABLET ORAL at 08:12

## 2017-12-23 RX ADMIN — CEFEPIME 2 G: 1 INJECTION, SOLUTION INTRAVENOUS at 11:34

## 2017-12-23 RX ADMIN — ACETYLCYSTEINE 2 ML: 100 SOLUTION ORAL; RESPIRATORY (INHALATION) at 07:43

## 2017-12-23 RX ADMIN — MINERAL OIL AND WHITE PETROLATUM: 150; 830 OINTMENT OPHTHALMIC at 21:48

## 2017-12-23 RX ADMIN — CEFEPIME 2 G: 1 INJECTION, SOLUTION INTRAVENOUS at 04:51

## 2017-12-23 RX ADMIN — Medication 2 G: at 06:45

## 2017-12-23 RX ADMIN — FUROSEMIDE 40 MG: 10 INJECTION, SOLUTION INTRAVENOUS at 19:37

## 2017-12-23 RX ADMIN — Medication 0.2 MG: at 04:58

## 2017-12-23 RX ADMIN — HYDRALAZINE HYDROCHLORIDE 10 MG: 20 INJECTION INTRAMUSCULAR; INTRAVENOUS at 13:26

## 2017-12-23 RX ADMIN — OLANZAPINE 5 MG: 5 TABLET, FILM COATED ORAL at 19:42

## 2017-12-23 RX ADMIN — OXYCODONE HYDROCHLORIDE 5 MG: 5 TABLET ORAL at 13:26

## 2017-12-23 RX ADMIN — KETOTIFEN FUMARATE 1 DROP: 0.25 SOLUTION/ DROPS OPHTHALMIC at 19:43

## 2017-12-23 RX ADMIN — HEPARIN SODIUM 5000 UNITS: 5000 INJECTION, SOLUTION INTRAVENOUS; SUBCUTANEOUS at 19:40

## 2017-12-23 RX ADMIN — MULTIVITAMIN 15 ML: LIQUID ORAL at 08:15

## 2017-12-23 RX ADMIN — CEFEPIME 2 G: 1 INJECTION, SOLUTION INTRAVENOUS at 21:44

## 2017-12-23 RX ADMIN — POLYETHYLENE GLYCOL 3350 17 G: 17 POWDER, FOR SOLUTION ORAL at 08:11

## 2017-12-23 RX ADMIN — ASPIRIN 81 MG CHEWABLE TABLET 81 MG: 81 TABLET CHEWABLE at 08:11

## 2017-12-23 RX ADMIN — HEPARIN SODIUM 5000 UNITS: 5000 INJECTION, SOLUTION INTRAVENOUS; SUBCUTANEOUS at 08:49

## 2017-12-23 RX ADMIN — Medication 300 MG: at 19:42

## 2017-12-23 ASSESSMENT — PAIN DESCRIPTION - DESCRIPTORS: DESCRIPTORS: SORE

## 2017-12-23 ASSESSMENT — ACTIVITIES OF DAILY LIVING (ADL): ADLS_ACUITY_SCORE: 30

## 2017-12-23 NOTE — PLAN OF CARE
Report given to KYRA Kohli on 6B. Pt will be transferring to 6B room 36-1. Pt transferred with belongings and daughter at bedside. Daughter voiced concerned about pt's bouts of confusion and agitation. She states she has noticed he can get like this when he has to have a bowel movement. Daughter very involved in care and would like to talk to social work about the next step/place to go when its time for him to leave the hospital

## 2017-12-23 NOTE — CONSULTS
Health Psychology                  Clinic    Department of Medicine  Carolina Johnson, PhD, LP [phone]                          Clinics and Surgery Center  [facility] Carl Aragon, PhD, LP [phone]                  3rd Floor  [place] Mail [address]   Jamey Spence, PhD, ABPP, LP [phone]     [address] [facility],   [address] [facility] Daniela Forde,  PhD, LP [phone]            [place] [address] Poornima Alaniz, PhD, LP [phone]     Inpatient Health Psychology Consultation    DATE OF SERVICE:  12/22/2017       REFERRAL:  Emanuel Anderson MD      REASON FOR REFERRAL:  Therapy recommended by Psychiatry who requested therapy by family.      SOURCES OF INFORMATION:  Information was obtained from review of medical record, clinical interview with patient and daughter.      HISTORY OF PRESENTING CONCERN:  Cricket Miguel is a 62-year-old male with history of COPD, hypertension, hyperlipidemia, CAD, aortic stenosis and ascending aortic aneurysm, status post aortic valve replacement and aortic root replacement (04/2016) complicated by periaortic abscess, endocarditis, cerebral septic emboli and severe AI status post redo sternotomy, aortic root and valve monograft on 12/19/2017.  Per review of medical record, it appears he has been experiencing delirium for approximately 1 month.  Consultation with nurse today suggested that he had fluctuating mental status, with agitation requiring 1:1 sitter, although he was able to somewhat participate in meaningful conversation.  Per review of medical record, he has been seen by Psychiatry at least 2 times, with a diagnosis of delirium likely secondary to infection.  The patient's daughter was present during this interview, with her stating that she very much welcomed Health Psychology intervention due to her father's anger and frustration with his deterioration in health.  The patient reported that he is angry, also  "stating that this situation\" sucks.\"  He was able to articulate that he was feeling angry and frustrated, but participation in meaningful conversation beyond this was difficult.  He was not able to articulate any meaningful coping strategies.  When prompted to discuss strategies such as guided imagery, he stated he has a peaceful place that he usually uses for coping when feeling overwhelmed or stressed.  Per review of medical record, it appears that collateral information obtained by Psychiatry from his girlfriend and 2 daughters have suggested that on and off since his stroke he has been quite agitated and to have temper tantrums for \"5-6 hours at a time.\"      PAST MEDICAL HISTORY:  See below list for current medical problems, past surgical history, and current medications.  Notably, he suffered a CVA due to septic emboli in early October, with participation in rehabilitation and re-hospitalization since that time.     Current Facility-Administered Medications   Medication     famotidine (PEPCID) suspension 20 mg     ceFEPIme (MAXIPIME) intermittent infusion 2 g     OLANZapine (zyPREXA) tablet 7.5 mg     HYDROmorphone (PF) (DILAUDID) injection 0.2 mg     oxyCODONE IR (ROXICODONE) tablet 5 mg     polyethylene glycol (MIRALAX/GLYCOLAX) Packet 17 g     OLANZapine (zyPREXA) tablet 5 mg     heparin sodium PF injection 5,000 Units     dextrose 10 % 1,000 mL infusion     multivitamins with minerals (CERTAVITE/CEROVITE) liquid 15 mL     Carboxymethylcellulose Sod PF (REFRESH PLUS) 0.5 % ophthalmic solution 1 drop     naloxone (NARCAN) injection 0.1-0.4 mg     naloxone (NARCAN) injection 0.1-0.4 mg     dextrose 10 % 1,000 mL infusion     insulin 1 unit/mL in saline (NovoLIN, HumuLIN Regular) drip - ADULT IV Infusion     glucose 40 % gel 15-30 g    Or     dextrose 50 % injection 25-50 mL    Or     glucagon injection 1 mg     lidocaine 1 % 1 mL     lidocaine (LMX4) kit     sodium chloride (PF) 0.9% PF flush 3 mL     sodium " chloride (PF) 0.9% PF flush 3 mL     Reason beta blocker order not selected     dextrose 5% and 0.45% NaCl + KCl 20 mEq/L infusion     hydrALAZINE (APRESOLINE) injection 10 mg     insulin aspart (NovoLOG) inj (RAPID ACTING)     insulin aspart (NovoLOG) inj (RAPID ACTING)     acetaminophen (TYLENOL) tablet 650 mg     ondansetron (ZOFRAN-ODT) ODT tab 4 mg    Or     ondansetron (ZOFRAN) injection 4 mg     senna-docusate (SENOKOT-S;PERICOLACE) 8.6-50 MG per tablet 1-2 tablet     temazepam (RESTORIL) capsule 15 mg     acetaminophen (TYLENOL) solution 650 mg     mupirocin (BACTROBAN) 2 % ointment 0.5 g     EPINEPHrine (ADRENALIN) 5 mg in NaCl 0.9 % 250 mL infusion     potassium chloride SA (K-DUR/KLOR-CON M) CR tablet 20-40 mEq     potassium chloride (KLOR-CON) Packet 20-40 mEq     potassium chloride 10 mEq in 100 mL sterile water intermittent infusion (premix)     potassium chloride 10 mEq in 100 mL intermittent infusion with 10 mg lidocaine     potassium chloride 20 mEq in 50 mL intermittent infusion     magnesium sulfate 2 g in NS intermittent infusion (PharMEDium or FV Cmpd)     magnesium sulfate 4 g in 100 mL sterile water (premade)     potassium phosphate 10 mmol in D5W 250 mL intermittent infusion     potassium phosphate 15 mmol in D5W 250 mL intermittent infusion     potassium phosphate 20 mmol in D5W 500 mL intermittent infusion     potassium phosphate 20 mmol in D5W 250 mL intermittent infusion     potassium phosphate 25 mmol in D5W 500 mL intermittent infusion     meperidine (DEMEROL) injection 12.5-25 mg     acetylcysteine (MUCOMYST) 10 % injection 2 mL     dexmedetomidine (PRECEDEX) 400 mcg in 0.9% sodium chloride 100 mL     mirtazapine (REMERON) tablet 30 mg     rifampin (REIFADEN) suspension 300 mg     ketotifen (ZADITOR/REFRESH ANTI-ITCH) 0.025 % ophthalmic solution 1 drop     artificial tears ophthalmic ointment     aspirin chewable tablet 81 mg     atorvastatin (LIPITOR) tablet 40 mg     Continuing beta  blocker from home medication list OR beta blocker order already placed during this visit     Past Medical History:   Diagnosis Date     Abscess of aortic root 09/2017     AI (aortic insufficiency)     severe     Anxiety      Aortic root dilatation (H)      AR (aortic regurgitation)     severe     Cardiomyopathy (H)      CHF (congestive heart failure), NYHA class II (H)      COPD, mild (H)     spirometry 12/16     CVA (cerebral vascular accident) (H) 09/2017    septic emboli - MSSA - cerebellum, Left MCA, Rt Occipital, Aphasia, Left visual field cut, moderate to severe encephalopathy     Depression 09/2017     Heart murmur      Hyperlipidemia LDL goal <70 10/31/2010     Hypertension goal BP (blood pressure) < 140/90      Inguinal hernia      left lung nodule  1/29/2008     Major depressive disorder, single episode, moderate (H)      Psoriasis childhood     Tobacco use disorder      Past Surgical History:   Procedure Laterality Date     ARTHROSCOPY KNEE INCISION AND DRAINAGE Left 10/8/2017    Procedure: ARTHROSCOPY KNEE INCISION AND DRAINAGE;  Arthroscopic Incision and Drainage of Left Knee;  Surgeon: Lucretia Munoz MD;  Location: [facility] OR     HC SHOULDER ARTHROSCOPY, DX  2001    Arthroscopy, Shoulder RT Dr OSIRIS Andersen     HC SHOULDER ARTHROSCOPY, DX  1/04    LT arthroscopy Dr OSIRIS Andersen     HERNIA REPAIR, UMBILICAL  1/08    incarcerated - dr rockwell     HERNIORRHAPHY INGUINAL  12/21/2012    HERNIORRHAPHY INGUINAL;  Right Inguinal Hernia Repair with mesh ;  Surgeon: Mello Rockwell MD;  Location:  OR     REPAIR ANEURYSM ASCENDING AORTA N/A 12/19/2017    Procedure: REPAIR ANEURYSM ASCENDING AORTA;  REDO Median STERNOTOMY, FEMORAL CANNULATION, Lysis of adhesions, AORTIC ROOT VALVE HOMOGRAFT with 26mm x 7.0cm Cryolife Aortic valve and conduit;  Surgeon: Pili Bowers MD;  Location: [facility] OR     REPLACE VALVE AORTIC N/A 5/17/2016    REPLACE VALVE AORTIC - Dr Bowers     ROTATOR CUFF REPAIR RT/LT  11/10     Rt arthroscopy - Dr OSIRIS Andersen     SURGICAL HISTORY OF -       AVR, severe AR, aortic root repair     TRACHEOSTOMY PERCUTANEOUS  10/27/2017             PSYCHIATRIC HISTORY:  It appears that he has some previous history of depression.  It appears that his wife  approximately 9 years ago with him receiving antidepressant treatment following this.  He also appears to have a recent history of anxiety and received 25 mg of Ativan which apparently helped symptoms.  Per review of medical record, he has also been treated with Seroquel on a regular basis, which provides moderate help.  It does not appear he has a history of psychiatric hospitalizations, and no chemical dependency history or chemical dependency treatment.  Per medical record, he is a regular cannabis smoker but very light drinker.  He quit smoking cigarettes last year.  Psychiatric medications are reported to include Zyprexa 5 mg b.i.d. plus 5 mg p.r.n. agitation, Effexor, Remeron and Ativan p.r.n.      SOCIAL HISTORY:  Documented in the medical record, see EMR for details. He reported good support with his girlfriend and children.      MENTAL STATUS EXAMINATION:  The patient was alert and sitting in a chair in his hospital room upon my arrival.  He was responsive to voice, orientation was not assessed at this time, with orientation waxing and waning per review of medical record in recent days.  He was somewhat agitated, with no abnormal motor movements, but appeared easily angered when asked about coping strategies.  He also had tangential thought processes, transitioning easily into a lengthy statement about his satisfaction with the current president.  Speech was difficult to understand.  Mood appears frustrated and affect was agitated.  Thought content was negative for suicidal ideation, plan, or intent.  It appears that he has denied suicidal ideation in the past.      IMPRESSION:  Cricket Miguel is a 62-year-old male with complex medical history  currently experiencing delirium, with episodes of waxing and waning cognitive status, who appears to be the most appropriate for medication management at this time.  However, per review of medical record and per report by his daughter, he has experienced difficulty adjusting following stroke with feelings of anger and sadness.  It appears that he may be a good candidate for Health Psychology Services following resolution of delirium.  Will continue to follow patient approximately once per week during hospitalization to continue monitoring mental status and provide problem focused interventions when appropriate.      DIAGNOSIS:  Delirium, likely secondary to infection; adjustment disorder with mixed anxiety and depressed mood.      RECOMMENDATIONS AND PLAN:  Discussed use of guided imagery to increase his ability to manage symptoms of agitation and anger.  However, it appears he is not able to integrate these at this time.  He appears most appropriate for psychiatric medication management currently and we will continue to monitor his appropriateness for psychotherapy.  Will continue to follow once per week during his hospital stay.      Daniela Delatorre, PhD, LP  Clinical Health Psychologist  Pager: [phone]       DANIELA DELATORRE, PHD, LP             D: 2017 14:19   T: 2017 21:22   MT:       Name:     ASHTYN STROUD   MRN:      [id number]        Account:       [id number]   :      1955           Consult Date:  2017      Document: [id number]

## 2017-12-23 NOTE — OP NOTE
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      PROCEDURES:    1.  Re-do sternotomy.   2.  Femoral arterial and venous cannulation for cardiopulmonary bypass.   3.  Aortic root and valve replacement with 26 mm Cryolife valve conduit homograft.      SURGEON:  Pili Bowers MD        COSURGEON:  Jero Soto MD      NOTE:  A second attending surgeon was required for this operation because of the complexity of the operation as the patient had a previous sternotomy with complex aortic root disruption.  Please refer to the operative note dictated by Dr. Pili Bowers.        DESCRIPTION OF OPERATION:  The patient was brought to operating room in stable condition.  Under general anesthesia, the patient's chest, abdomen and lower extremities were prepped and draped in the usual sterile manner.  The right femoral artery and vein were cannulated.  Full cardiopulmonary bypass was obtained.  The patient was cooled down to 28 degrees centigrade.  Careful redo sternotomy was performed and the sternum was carefully opened without disrupting the pseudoaneurysm.  Retrograde cardioplegic cannula placed.  The distal ascending aorta was also exposed.  The cross-clamp was placed and pseudoaneurysm was excised.  The aortic buttons were also dissected free.  The aortic annulus thoroughly debrided of all the necrotic tissue, a 26 mm homograft was sewn in the usual standard fashion.  The coronary buttons were also attached in the usual standard fashion.  Distal aortic anastomosis performed with 4-0 Prolene suture.  De-airing maneuvers were performed in the usual standard fashion.  It should be noted that adequate cardioplegic protection was given to the heart, the cross-clamp was  released.  The patient gradually weaned off cardiopulmonary bypass usual standard fashion.  The sternum and the wound was closed in layers.  Transferred to ICU in stable condition.         CLAUDETTE BLANC MD             D: 2017 10:43   T: 2017 16:19   MT:       Name:     ASHTYN STROUD   MRN:      [id number]        Account:        [id number]   :      1955           Procedure Date: 2017      Document: [id number]

## 2017-12-23 NOTE — PLAN OF CARE
Problem: Patient Care Overview  Goal: Plan of Care/Patient Progress Review  Outcome: No Change  Pt pleasantly confused, cooperative with cares. Rested for most of shift. Precedex at 0.2 mcg/kg/min, dilauded x 2 for pain. In SR 80-90's. Hydralazine x 1. Minimal CT output. LS coarse, NT suctioned x 2. Frequent pulmonary toileting encouraged.  Loose stool x 2. Rectal pouch applied. UOP: /hr. Will continue to monitor and notify team with concerns.

## 2017-12-23 NOTE — PROGRESS NOTES
[facility]  CARDIOLOGY DAILY PROGRESS NOTE    Interval History:   More alert and oriented.  Less agitation  To be transferring out of ICU    Objective  Temp:  [97.9  F (36.6  C)-99  F (37.2  C)] 98.9  F (37.2  C)  Heart Rate:  [] 101  Resp:  [16-18] 17  BP: (140-160)/(87-98) 143/87  MAP:  [64 mmHg-121 mmHg] 106 mmHg  Arterial Line BP: ()/(51-96) 153/75  SpO2:  [89 %-100 %] 97 %     I/O last 3 completed shifts:  In: 2609.13 [I.V.:509.13; NG/GT:540]  Out: 1695 [Urine:1595; Chest Tube:100]  Wt Readings from Last 5 Encounters:   12/23/17 83.7 kg (184 lb 8.4 oz)   12/15/17 83 kg (183 lb)   11/02/17 88.3 kg (194 lb 10.7 oz)   02/06/17 91.6 kg (202 lb)   01/09/17 87.5 kg (193 lb)     GEN: pleasant, no acute distress  HEENT: no icterus  CV: RRR, normal s1/s2, no murmurs/rubs/s3/s4, no heave. JVP at 8-9 cm.   CHEST: on 2L NC, normal work of breathing, clear to ausculation bilaterally, no rales or wheezing  ABD: soft, non-tender, normal active bowel sounds  EXTR: pulses 2+ and equal. No clubbing, cyanosis.  Trace lower extremity edema.   NEURO: alert oriented, speech fluent/appropriate, motor grossly nonfocal    Current Medications    famotidine  20 mg Oral or Feeding Tube Daily     ceFEPIme (MAXIPIME) IV  2 g Intravenous Q8H     OLANZapine  7.5 mg Oral BID     polyethylene glycol  17 g Oral Daily     heparin  5,000 Units Subcutaneous Q8H     multivitamins with minerals  15 mL Per Feeding Tube Daily     sodium chloride (PF)  3 mL Intracatheter Q8H     insulin aspart   Subcutaneous TID w/meals     senna-docusate  1-2 tablet Oral BID     mupirocin  0.5 g Both Nostrils BID     acetylcysteine  2 mL Nebulization Q4H     mirtazapine  30 mg Oral At Bedtime     rifampin  300 mg Oral or Feeding Tube BID     ketotifen  1 drop Left Eye TID     artificial tears   Ophthalmic At Bedtime     aspirin  81 mg Oral or Feeding Tube Daily     atorvastatin  40 mg Oral Daily       IV fluid REPLACEMENT ONLY        IV fluid REPLACEMENT ONLY       insulin (regular) Stopped (12/21/17 0100)     Reason beta blocker order not selected       dextrose 5% and 0.45% NaCl + KCl 20 mEq/L 75 mL/hr at 12/21/17 2018     EPINEPHrine IV infusion ADULT Stopped (12/21/17 1900)     dexmedetomidine Stopped (12/23/17 0700)     - MEDICATION INSTRUCTIONS -       Lab Values  BMP  Recent Labs  Lab 12/23/17  0420 12/22/17  1626 12/22/17  0401 12/21/17 1955 12/21/17  0154 12/20/17 0412   *  --  145*  --  143  --  144   POTASSIUM 3.8 3.7 3.8 3.6 3.9  < > 4.2   CHLORIDE 110*  --  110*  --  109  --  109   IZABELLA 8.4*  --  8.4*  --  8.0*  --  8.2*   CO2 28  --  29  --  27  --  25   BUN 27  --  26  --  16  --  13   CR 1.17  --  1.63*  --  1.63*  --  1.33*   *  --  112*  --  105*  --  160*   < > = values in this interval not displayed.  LFTs    Recent Labs  Lab 12/18/17  0359 12/17/17 2024 12/17/17  0019   ALKPHOS 92 94 102   AST 32 29 33   ALT 30 32 34   BILITOTAL 1.3 1.0 1.3   PROTTOTAL 7.0 7.0 7.4   ALBUMIN 1.5* 1.6* 1.6*      CBC  Recent Labs  Lab 12/23/17  0420  12/22/17  0401 12/21/17  0154  12/20/17 0412 12/20/17  0002   WBC 11.1*  --   --  13.4*  --  15.5* 16.3*   RBC 2.64*  --   --  2.72*  --  2.62* 2.28*   HGB 7.9*  < > 7.9* 8.2*  < > 8.0* 7.0*   HCT 25.5*  --   --  25.0*  --  24.0* 21.4*   MCV 97  --   --  92  --  92 94   MCH 29.9  --   --  30.1  --  30.5 30.7   MCHC 31.0*  --   --  32.8  --  33.3 32.7   RDW 18.3*  --   --  19.0*  --  18.0* 17.9*     --  210 250  --  358 338   < > = values in this interval not displayed.  INR    Recent Labs  Lab 12/21/17  0154 12/20/17  0748 12/19/17  1806 12/19/17  1653   INR 1.35* 1.29* 0.90 0.81*     Studies  EKG: low voltage, sinus rhythm     Transthoracic echocardiogram:   Left ventricular wall thickness is normal. Mild left ventricular dilation is present. The Ejection Fraction is estimated at 30-35%. Diastolic function not assessed due to tachycardia. Severe diffuse hypokinesis is  present.     The right ventricle is normal size. Global right ventricular function is mildly reduced.  Both atria appear normal.  Trace mitral insufficiency is present.  The tricuspid valve is normal.     Vessels  The patient is post bio-Bentall - aortic root replacement with a 30 mm graft and AVR with a Trifecta valve. Today, there is circumferential free space noted surrounding the aortic graft, dehiscence and rocking of the graft from the native aorta and severe AI in the space surrounding the aortic graft (perivalvular/perigraft). The circumferential free space was present on the  previous studies of October 2017 but was filled with echodense material and the graft was not independently mobile, and there was no significant AI. The prosthetic aortic valve leaflets are not well visualized today.     No pericardial effusion is present.     Compared to Previous Study  This study was compared with the study from 10/06/2017 . Since the prior study, the LV function has worsened and the aortic graft appears to be dehisced, as described above.        Assessment and Recommendation:  Cricket Miguel is a 62 year old male admitted with worsening dyspnea, thought to have acute severe AI on echocardiogram.  Pertinent history of aortic stenosis and ascending aortic aneurysm s/p AVR and root replacement April 2016.  Had complications of moises aortic abscess, endocarditis, and septic emboli to his brain.  He underwent redo sternotomy with aortic root and valve homograft 12/19.  Heart failure service is being consulted for newly reduced EF 20-25%.     Troponin mildly elevated (2), right is fine in setting of just having major surgery.  Would be much higher if ischemic induced HF.    This likely is from the severe AI and recent surgery.  I imagine his EF should improve over time if he is able to maintain euvolemia and be on evidence based therapies. Can start isordil/hydralazine combo 20/25.  His BP has fluctuated, MAP did  decrease to 60s overnight, but has been higher since.  If his pressures can tolerate that afterwards, then can start coreg or toprol XL.      Can try some diuresis with 40 lasix IV.     I have discussed the above with Dr. Browne.     Thank you for consulting the cardiovascular services at the [facility]. Please do not hesitate to call us with any questions.      Pardeep Cruz MD PGY4  Cardiology Fellow  [phone]      Pt's condition and care plan discussed with fellow but patient not seen personally by me today.    Shakila Browne MD  Section Head - Advanced Heart Failure, Transplantation and Mechanical Circulatory Support  Co-Director - [facility]  Associate Professor of Medicine, [facility]

## 2017-12-23 NOTE — PLAN OF CARE
Problem: Patient Care Overview  Goal: Plan of Care/Patient Progress Review  Discharge Planner SLP   Patient plan for discharge: patient didn't state  Current status: The patient remains appropriate for a full liquid diet with close supervision and assistance for safety strategies (only feed when alert and engaged in task, must sit upright, take one small sip at a time, feed on stronger right side).   Barriers to return to prior living situation: AMS, severity of deficits, requiring modified diet for safer swallowing  Recommendations for discharge: TCU  Rationale for recommendations: oropharyngeal swallowing is below baseline level       Entered by: Mila Pena 12/23/2017 12:39 PM

## 2017-12-24 ENCOUNTER — APPOINTMENT (OUTPATIENT)
Dept: SPEECH THERAPY | Facility: CLINIC | Age: 62
DRG: 219 | End: 2017-12-24
Attending: INTERNAL MEDICINE
Payer: COMMERCIAL

## 2017-12-24 ENCOUNTER — APPOINTMENT (OUTPATIENT)
Dept: GENERAL RADIOLOGY | Facility: CLINIC | Age: 62
DRG: 219 | End: 2017-12-24
Attending: SURGERY
Payer: COMMERCIAL

## 2017-12-24 LAB
ANION GAP SERPL CALCULATED.3IONS-SCNC: 7 MMOL/L (ref 3–14)
BACTERIA SPEC CULT: NO GROWTH
BUN SERPL-MCNC: 29 MG/DL (ref 7–30)
CALCIUM SERPL-MCNC: 9 MG/DL (ref 8.5–10.1)
CHLORIDE SERPL-SCNC: 107 MMOL/L (ref 94–109)
CO2 SERPL-SCNC: 28 MMOL/L (ref 20–32)
CREAT SERPL-MCNC: 0.95 MG/DL (ref 0.66–1.25)
GFR SERPL CREATININE-BSD FRML MDRD: 80 ML/MIN/1.7M2
GLUCOSE BLDC GLUCOMTR-MCNC: 157 MG/DL (ref 70–99)
GLUCOSE SERPL-MCNC: 142 MG/DL (ref 70–99)
LACTATE BLD-SCNC: 1 MMOL/L (ref 0.7–2)
Lab: 1
Lab: 2
Lab: 3
MAGNESIUM SERPL-MCNC: 2.1 MG/DL (ref 1.6–2.3)
POTASSIUM SERPL-SCNC: 3.9 MMOL/L (ref 3.4–5.3)
SODIUM SERPL-SCNC: 142 MMOL/L (ref 133–144)
SPECIMEN SOURCE: NORMAL

## 2017-12-24 PROCEDURE — 92526 ORAL FUNCTION THERAPY: CPT | Mod: GN

## 2017-12-24 PROCEDURE — 36592 COLLECT BLOOD FROM PICC: CPT | Performed by: THORACIC SURGERY (CARDIOTHORACIC VASCULAR SURGERY)

## 2017-12-24 PROCEDURE — 25000132 ZZH RX MED GY IP 250 OP 250 PS 637: Performed by: INTERNAL MEDICINE

## 2017-12-24 PROCEDURE — 80048 BASIC METABOLIC PNL TOTAL CA: CPT | Performed by: THORACIC SURGERY (CARDIOTHORACIC VASCULAR SURGERY)

## 2017-12-24 PROCEDURE — 25000132 ZZH RX MED GY IP 250 OP 250 PS 637: Performed by: PHYSICIAN ASSISTANT

## 2017-12-24 PROCEDURE — 25000128 H RX IP 250 OP 636: Performed by: THORACIC SURGERY (CARDIOTHORACIC VASCULAR SURGERY)

## 2017-12-24 PROCEDURE — 83735 ASSAY OF MAGNESIUM: CPT | Performed by: THORACIC SURGERY (CARDIOTHORACIC VASCULAR SURGERY)

## 2017-12-24 PROCEDURE — 25000128 H RX IP 250 OP 636: Performed by: INTERNAL MEDICINE

## 2017-12-24 PROCEDURE — 99232 SBSQ HOSP IP/OBS MODERATE 35: CPT | Mod: GC | Performed by: INTERNAL MEDICINE

## 2017-12-24 PROCEDURE — 25000132 ZZH RX MED GY IP 250 OP 250 PS 637: Performed by: ANESTHESIOLOGY

## 2017-12-24 PROCEDURE — 83605 ASSAY OF LACTIC ACID: CPT | Performed by: THORACIC SURGERY (CARDIOTHORACIC VASCULAR SURGERY)

## 2017-12-24 PROCEDURE — 25000132 ZZH RX MED GY IP 250 OP 250 PS 637: Performed by: THORACIC SURGERY (CARDIOTHORACIC VASCULAR SURGERY)

## 2017-12-24 PROCEDURE — 93010 ELECTROCARDIOGRAM REPORT: CPT | Performed by: INTERNAL MEDICINE

## 2017-12-24 PROCEDURE — 25000128 H RX IP 250 OP 636: Performed by: PHYSICIAN ASSISTANT

## 2017-12-24 PROCEDURE — 25000128 H RX IP 250 OP 636

## 2017-12-24 PROCEDURE — 25000132 ZZH RX MED GY IP 250 OP 250 PS 637: Performed by: HOSPITALIST

## 2017-12-24 PROCEDURE — 25000132 ZZH RX MED GY IP 250 OP 250 PS 637: Performed by: SURGERY

## 2017-12-24 PROCEDURE — 40000225 ZZH STATISTIC SLP WARD VISIT

## 2017-12-24 PROCEDURE — 40000275 ZZH STATISTIC RCP TIME EA 10 MIN

## 2017-12-24 PROCEDURE — 94640 AIRWAY INHALATION TREATMENT: CPT | Mod: 76

## 2017-12-24 PROCEDURE — 25000132 ZZH RX MED GY IP 250 OP 250 PS 637

## 2017-12-24 PROCEDURE — 25000125 ZZHC RX 250: Performed by: SURGERY

## 2017-12-24 PROCEDURE — 12000006 ZZH R&B IMCU INTERMEDIATE UMMC

## 2017-12-24 PROCEDURE — 27210437 ZZH NUTRITION PRODUCT SEMIELEM INTERMED LITER

## 2017-12-24 PROCEDURE — 25000125 ZZHC RX 250: Performed by: STUDENT IN AN ORGANIZED HEALTH CARE EDUCATION/TRAINING PROGRAM

## 2017-12-24 PROCEDURE — 71010 XR CHEST PORT 1 VW: CPT

## 2017-12-24 PROCEDURE — 93005 ELECTROCARDIOGRAM TRACING: CPT

## 2017-12-24 PROCEDURE — 00000146 ZZHCL STATISTIC GLUCOSE BY METER IP

## 2017-12-24 PROCEDURE — 94640 AIRWAY INHALATION TREATMENT: CPT

## 2017-12-24 PROCEDURE — 94668 MNPJ CHEST WALL SBSQ: CPT

## 2017-12-24 RX ORDER — FUROSEMIDE 10 MG/ML
40 INJECTION INTRAMUSCULAR; INTRAVENOUS ONCE
Status: COMPLETED | OUTPATIENT
Start: 2017-12-24 | End: 2017-12-24

## 2017-12-24 RX ORDER — ISOSORBIDE DINITRATE 5 MG/1
20 TABLET ORAL 3 TIMES DAILY
Status: DISCONTINUED | OUTPATIENT
Start: 2017-12-24 | End: 2017-12-26

## 2017-12-24 RX ORDER — HYDRALAZINE HYDROCHLORIDE 25 MG/1
25 TABLET, FILM COATED ORAL 3 TIMES DAILY
Status: DISCONTINUED | OUTPATIENT
Start: 2017-12-24 | End: 2017-12-24

## 2017-12-24 RX ORDER — HYDRALAZINE HYDROCHLORIDE 10 MG/1
10 TABLET, FILM COATED ORAL 3 TIMES DAILY
Status: DISCONTINUED | OUTPATIENT
Start: 2017-12-24 | End: 2017-12-24

## 2017-12-24 RX ORDER — HYDRALAZINE HYDROCHLORIDE 25 MG/1
25 TABLET, FILM COATED ORAL 3 TIMES DAILY
Status: DISCONTINUED | OUTPATIENT
Start: 2017-12-24 | End: 2017-12-26

## 2017-12-24 RX ADMIN — HYDRALAZINE HYDROCHLORIDE 10 MG: 20 INJECTION INTRAMUSCULAR; INTRAVENOUS at 04:14

## 2017-12-24 RX ADMIN — ACETAMINOPHEN 650 MG: 160 SUSPENSION ORAL at 03:57

## 2017-12-24 RX ADMIN — FUROSEMIDE 40 MG: 10 INJECTION, SOLUTION INTRAVENOUS at 17:14

## 2017-12-24 RX ADMIN — ACETYLCYSTEINE 2 ML: 100 SOLUTION ORAL; RESPIRATORY (INHALATION) at 13:43

## 2017-12-24 RX ADMIN — ALBUTEROL SULFATE 2.5 MG: 2.5 SOLUTION RESPIRATORY (INHALATION) at 08:44

## 2017-12-24 RX ADMIN — MULTIVITAMIN 15 ML: LIQUID ORAL at 08:06

## 2017-12-24 RX ADMIN — OLANZAPINE 5 MG: 5 TABLET, FILM COATED ORAL at 21:49

## 2017-12-24 RX ADMIN — ACETYLCYSTEINE 2 ML: 100 SOLUTION ORAL; RESPIRATORY (INHALATION) at 04:16

## 2017-12-24 RX ADMIN — ALBUTEROL SULFATE 2.5 MG: 2.5 SOLUTION RESPIRATORY (INHALATION) at 16:49

## 2017-12-24 RX ADMIN — KETOTIFEN FUMARATE 1 DROP: 0.25 SOLUTION/ DROPS OPHTHALMIC at 21:48

## 2017-12-24 RX ADMIN — KETOTIFEN FUMARATE 1 DROP: 0.25 SOLUTION/ DROPS OPHTHALMIC at 08:06

## 2017-12-24 RX ADMIN — KETOTIFEN FUMARATE 1 DROP: 0.25 SOLUTION/ DROPS OPHTHALMIC at 14:12

## 2017-12-24 RX ADMIN — ALBUTEROL SULFATE 2.5 MG: 2.5 SOLUTION RESPIRATORY (INHALATION) at 13:42

## 2017-12-24 RX ADMIN — Medication 300 MG: at 21:48

## 2017-12-24 RX ADMIN — HYDRALAZINE HYDROCHLORIDE 10 MG: 20 INJECTION INTRAMUSCULAR; INTRAVENOUS at 00:42

## 2017-12-24 RX ADMIN — CEFEPIME 2 G: 1 INJECTION, SOLUTION INTRAVENOUS at 21:48

## 2017-12-24 RX ADMIN — OXYCODONE HYDROCHLORIDE 5 MG: 5 TABLET ORAL at 15:41

## 2017-12-24 RX ADMIN — OLANZAPINE 5 MG: 5 TABLET, FILM COATED ORAL at 13:44

## 2017-12-24 RX ADMIN — CEFEPIME 2 G: 1 INJECTION, SOLUTION INTRAVENOUS at 04:14

## 2017-12-24 RX ADMIN — MIRTAZAPINE 30 MG: 15 TABLET, FILM COATED ORAL at 21:49

## 2017-12-24 RX ADMIN — ASPIRIN 81 MG CHEWABLE TABLET 81 MG: 81 TABLET CHEWABLE at 08:06

## 2017-12-24 RX ADMIN — ACETYLCYSTEINE 2 ML: 100 SOLUTION ORAL; RESPIRATORY (INHALATION) at 19:23

## 2017-12-24 RX ADMIN — FAMOTIDINE 20 MG: 40 POWDER, FOR SUSPENSION ORAL at 08:06

## 2017-12-24 RX ADMIN — ISOSORBIDE DINITRATE 20 MG: 5 TABLET ORAL at 22:39

## 2017-12-24 RX ADMIN — Medication 300 MG: at 08:06

## 2017-12-24 RX ADMIN — OLANZAPINE 5 MG: 5 TABLET, FILM COATED ORAL at 08:05

## 2017-12-24 RX ADMIN — ACETYLCYSTEINE 2 ML: 100 SOLUTION ORAL; RESPIRATORY (INHALATION) at 08:44

## 2017-12-24 RX ADMIN — HEPARIN SODIUM 5000 UNITS: 5000 INJECTION, SOLUTION INTRAVENOUS; SUBCUTANEOUS at 09:24

## 2017-12-24 RX ADMIN — HYDRALAZINE HYDROCHLORIDE 25 MG: 25 TABLET ORAL at 21:49

## 2017-12-24 RX ADMIN — HEPARIN SODIUM 5000 UNITS: 5000 INJECTION, SOLUTION INTRAVENOUS; SUBCUTANEOUS at 17:15

## 2017-12-24 RX ADMIN — HYDRALAZINE HYDROCHLORIDE 10 MG: 20 INJECTION INTRAMUSCULAR; INTRAVENOUS at 13:19

## 2017-12-24 RX ADMIN — OXYCODONE HYDROCHLORIDE 5 MG: 5 TABLET ORAL at 03:56

## 2017-12-24 RX ADMIN — ALBUTEROL SULFATE 2.5 MG: 2.5 SOLUTION RESPIRATORY (INHALATION) at 19:23

## 2017-12-24 RX ADMIN — ATORVASTATIN CALCIUM 40 MG: 40 TABLET, FILM COATED ORAL at 08:06

## 2017-12-24 RX ADMIN — ALBUTEROL SULFATE 2.5 MG: 2.5 SOLUTION RESPIRATORY (INHALATION) at 04:15

## 2017-12-24 RX ADMIN — HYDRALAZINE HYDROCHLORIDE 25 MG: 25 TABLET ORAL at 17:14

## 2017-12-24 RX ADMIN — CEFEPIME 2 G: 1 INJECTION, SOLUTION INTRAVENOUS at 11:54

## 2017-12-24 RX ADMIN — HEPARIN SODIUM 5000 UNITS: 5000 INJECTION, SOLUTION INTRAVENOUS; SUBCUTANEOUS at 00:46

## 2017-12-24 RX ADMIN — ACETYLCYSTEINE 2 ML: 100 SOLUTION ORAL; RESPIRATORY (INHALATION) at 16:49

## 2017-12-24 RX ADMIN — ISOSORBIDE DINITRATE 20 MG: 5 TABLET ORAL at 17:14

## 2017-12-24 ASSESSMENT — ACTIVITIES OF DAILY LIVING (ADL)
ADLS_ACUITY_SCORE: 30
ADLS_ACUITY_SCORE: 32
ADLS_ACUITY_SCORE: 30
ADLS_ACUITY_SCORE: 32
ADLS_ACUITY_SCORE: 32
ADLS_ACUITY_SCORE: 30

## 2017-12-24 NOTE — PLAN OF CARE
Problem: Patient Care Overview  Goal: Plan of Care/Patient Progress Review  Outcome: No Change  Neuro: Garbled speech, left facial droop, alert, disoriented to time and place. Anxious and upset this afternoon when family said they couldn't take him home today.  Cardiac: ST. VSS. PRN hydralazine x1.   Respiratory: Sating 99% on 2L NC.  GI/: Adequate urine output. BM X2  Diet/appetite: Tolerating full liquid diet and TF. Eating fair.  Activity:  Assist of 2, repositioning in bed.  Pain: At acceptable level on current regimen.   Skin: Incisions, tube sites, no new deficits noted.  LDA's: Left CT -20 sx, Right CT -20 sx, G tube with TF at 65ml/hr with 30 ml water flush Q4. Capped temporary pacemaker wires.    Plan: Continue with POC. Notify primary team with changes. Speech worked with pt today.

## 2017-12-24 NOTE — PROGRESS NOTES
CARDIOTHORACIC SURGERY PROGRESS NOTE: 12/23/2017    ASSESSMENT: Cricket Miguel is a 62 year old male with hx of COPD, HTN, HLD, CAD, heart failure with preserved EF, aortic stenosis and ascending aortic aneurysm s/p aortic valve replacement and aortic root replacement (April 2016) c/b moises-aortic abscess, endocarditis, cerebral septic emboli and severe AI s/p redo-sternotomy aortic root and valve homograft 12/19.       PLAN:   Neuro/ pain/ sedation: Hx of multifocal acute infarctions involving the left parietotemporal lobes, left cerebral hemisphere and right occipital lobe presumed to be septic emboli. Depression, mild delirium, followed by psych.  -Monitor neurological status. Notify the MD for any acute changes in exam.  -dilaudid PRN.  -Mental status improving.  - On psych rec started olanzapine. Controlled agitation better last night. Serial EKGs to evaluate QT interval.     Pulmonary care: extubated 12/20  - On facemask 2 LPM saturation 100%.  - Not participating in pulmonary toilet, not coughing or actively clearing secretions due to delirium and inability to follow commands. Continue to encourage as mental status improves.  - Continue to encourage pulmonary toilet as able. RT called to do acapella therapy q4.     Cardiovascular:  HTN, HLD, CAD, AS and aortic aneurysm s/p bentall c/b endocarditis and severe AI s/p aortic root/valve homograft 12/19.  - Minimal Epinephrine to maintain MAP>65  -Keep SBP<120  -Aggressively treat pain and agitation to control blood pressure. Hydralazine not effective.  -ASA 81mg and atorvastatin.  -Echo shows EF 20-25% baseline 30%  -As per cards trend troponin levels to rule out new onset ischemia induced cardiomyopathy.     GI care: PEG  -famotidine  -senna/colace/miralax     Fluids/ Electrolytes/ Nutrition:   -PRN electrolyte replacement  -No indication for parenteral nutrition.     Renal/ Fluid Balance: Urine output is adequate so far.  -Espinoza catheter for strict intake and  output.  - monitor BMP, creatinine  - Gave lasix 40mg x1 for volume overload 12/21. 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 Hx of MSSA septic arthritis and bacteremia. Hx of multifocal acute infarctions involving the left parietotemporal lobes, left cerebral hemisphere and right occipital lobe presumed to be septic emboli. ID following.   -New cultures BAL growing pseudomonas aeruginosa. ID following. Continue cefepime/rifampin.   -F/u surgical tissue cultures     Heme: acute blood loss anemia  - CBC stable.     Prophylaxis:    -Mechanical prophylaxis for DVT.   -start SC heparin.      Lines/ tubes/ drains:  -R IJ CVC, L radial arterial line, R triple lumen PICC, garza, PIVs. D/C swan, CVC, and art line.      Disposition:  -CVICU, transfer to floor today.     Patient seen, findings and plan discussed with CVICU staff Susan Rodriguez MD  PGY-3, General Surgery    - - - - - - - - - - - - - - - - - - - - - - - - - - - - - - - - - - - - - - - - - - - - - - - - - - - - - - - - - - - - - - - - - - - - - - - -   Overnight:   Less agitation last night with olanzapine.    PHYSICAL EXAMINATION:  Temp:  [97.9  F (36.6  C)-99.2  F (37.3  C)] 98.9  F (37.2  C)  Heart Rate:  [] 106  Resp:  [15-20] 20  BP: (140-168)/() 154/101  MAP:  [64 mmHg-121 mmHg] 106 mmHg  Arterial Line BP: ()/(51-96) 153/75  SpO2:  [89 %-100 %] 99 %  General: inappropriate affect. Belligerent. Improving orientation.   HEENT: Normocephalic, atraumatic. Left sided facial droop (old)  Chest wall: Symmetric thorax. Median sternotomy dressing c/d/i.   Respiratory: Equal BS bilateraly. Copious secretions. Ineffective cough.  Cardiovascular: RRR, chest tubes draining serosanguinous fluid.   Gastrointestinal: Abdomen soft, non-distended.  Genitourinary: Garza catheter in place.   Extremities: no peripheral LE edema. Right sided weakness UE.  Skin: As noted  above. No rashes or lesions appreciated.   Lines: R IJ CVC, L radial arterial line, R PICC      Recent Labs  Lab 12/22/17  1354 12/21/17  0154 12/20/17  2056 12/20/17  1730   PH 7.43 7.40 7.41 7.44   PCO2 41 42 40 34*   PO2 130* 146* 160* 107*   HCO3 28 26 25 23     Complete Blood Count     Recent Labs  Lab 12/23/17  0420 12/22/17  1626 12/22/17  0401 12/21/17  0154  12/20/17  0412 12/20/17  0002   WBC 11.1*  --   --  13.4*  --  15.5* 16.3*   HGB 7.9* 7.9* 7.9* 8.2*  < > 8.0* 7.0*     --  210 250  --  358 338   < > = values in this interval not displayed.  Basic Metabolic Panel    Recent Labs  Lab 12/23/17  0420 12/22/17  1626 12/22/17  0401 12/21/17  1955 12/21/17  0154  12/20/17  0412   *  --  145*  --  143  --  144   POTASSIUM 3.8 3.7 3.8 3.6 3.9  < > 4.2   CHLORIDE 110*  --  110*  --  109  --  109   CO2 28  --  29  --  27  --  25   BUN 27  --  26  --  16  --  13   CR 1.17  --  1.63*  --  1.63*  --  1.33*   *  --  112*  --  105*  --  160*   < > = values in this interval not displayed.  Liver Function Tests    Recent Labs  Lab 12/21/17  0154 12/20/17  0748 12/19/17  1806 12/19/17  1653  12/18/17  0359 12/17/17 2024 12/17/17  0019   AST  --   --   --   --   --  32 29 33   ALT  --   --   --   --   --  30 32 34   ALKPHOS  --   --   --   --   --  92 94 102   BILITOTAL  --   --   --   --   --  1.3 1.0 1.3   ALBUMIN  --   --   --   --   --  1.5* 1.6* 1.6*   INR 1.35* 1.29* 0.90 0.81*  < > 1.12 1.10 1.06   < > = values in this interval not displayed.      Recent Labs  Lab 12/21/17  0154 12/20/17  0748 12/19/17  1806 12/19/17  1653 12/19/17  1515   INR 1.35* 1.29* 0.90 0.81* 1.56*   PTT  --  39* 52* 43* 37     Recent Results (from the past 24 hour(s))   XR Chest Port 1 View    Narrative    Exam: XR CHEST PORT 1 VW, 12/22/2017 10:39 AM    Indication: chest tubes;     Comparison: 12/21/2017    Findings:   There is a single semiupright portable view of the chest. Interval  removal of the right IJ  Canton-Amor. The right IJ [place]-Amor sheath  projects over the superior SVC. Right sided PICC terminating in the  subclavian artery. There is an interval removal of a mediastinal  drain. The right and left chest tubes are in stable position. The  cardiomediastinal silhouette is enlarged, stable from prior. The upper  abdomen is unremarkable. There is an interval decrease of the  bilateral sided pleural effusion. No pneumothorax.      Impression    Impression:   1. Interval removal of mediastinal drain and [place]-Amor catheter.  Support devices are otherwise stable as listed above.  2. Interval decrease of bilateral pleural effusions.    I have personally reviewed the examination and initial interpretation  and I agree with the findings.    SAMANTHA CHRISTOPHER MD

## 2017-12-24 NOTE — PLAN OF CARE
Problem: Patient Care Overview  Goal: Plan of Care/Patient Progress Review  Neuro: A&O with intermittently confusion.   Cardiac: SR,  VSS.   Respiratory: Sating 96% on 2 L by NC. Pt verbalized that the neb treatment helped him to breath better. No ENT suctioning needed.   GI/: Adequate urine output. BM X1  Diet/appetite: Tolerating TF and  full liquid diet. Eating well with assist of one.   Activity:  Assist of 2 to turn and reposition.  Pain: At acceptable level on current regimen.   Skin: Intact, no new deficits noted.  LDA's: PICC patent and intact.   Plan: Continue with POC. Notify primary team with changes.

## 2017-12-24 NOTE — PROGRESS NOTES
CV ICU PROGRESS NOTE: 12/23/2017    ASSESSMENT: Cricket Miguel is a 62 year old male with hx of COPD, HTN, HLD, CAD, heart failure with preserved EF, aortic stenosis and ascending aortic aneurysm s/p aortic valve replacement and aortic root replacement (April 2016) c/b moises-aortic abscess, endocarditis, cerebral septic emboli and severe AI s/p redo-sternotomy aortic root and valve homograft 12/19.       PLAN:   Neuro/ pain/ sedation: Hx of multifocal acute infarctions involving the left parietotemporal lobes, left cerebral hemisphere and right occipital lobe presumed to be septic emboli. Depression, mild delirium, followed by psych.  -Monitor neurological status. Notify the MD for any acute changes in exam.  -dilaudid PRN.  -Mental status improving.  - On psych rec started olanzapine. Controlled agitation better last night. Serial EKGs to evaluate QT interval.     Pulmonary care: extubated 12/20  - On facemask 2 LPM saturation 100%.  - Not participating in pulmonary toilet, not coughing or actively clearing secretions due to delirium and inability to follow commands. Continue to encourage as mental status improves.  - Nasotracheal suctioning  - Continue to encourage pulmonary toilet as able. RT called to do acapella therapy q4.     Cardiovascular:  HTN, HLD, CAD, AS and aortic aneurysm s/p bentall c/b endocarditis and severe AI s/p aortic root/valve homograft 12/19.  - Minimal Epinephrine to maintain MAP>65  -Keep SBP<120  -Aggressively treat pain and agitation to control blood pressure. Hydralazine not effective.  -ASA 81mg and atorvastatin.  -Echo shows EF 20-25% baseline 30%  -As per cards trend troponin levels to rule out new onset ischemia induced cardiomyopathy.     GI care: PEG  -famotidine  -senna/colace/miralax     Fluids/ Electrolytes/ Nutrition:   -PRN electrolyte replacement  -No indication for parenteral nutrition.     Renal/ Fluid Balance: Urine output is adequate so far.  -Espinoza catheter for strict  intake and output.  - monitor BMP, creatinine  - Gave lasix 40mg x1 for volume overload 12/21. 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 Hx of MSSA septic arthritis and bacteremia. Hx of multifocal acute infarctions involving the left parietotemporal lobes, left cerebral hemisphere and right occipital lobe presumed to be septic emboli. ID following.   -New cultures BAL growing pseudomonas aeruginosa. ID following. Continue cefepime/rifampin.   -F/u surgical tissue cultures     Heme: acute blood loss anemia  - CBC stable.     Prophylaxis:    -Mechanical prophylaxis for DVT.   -start SC heparin.      Lines/ tubes/ drains:  -R IJ CVC, L radial arterial line, R triple lumen PICC, espinoza, PIVs. D/C swan, CVC, and art line.      Disposition:  -CVICU, transfer to floor today.     Patient seen, findings and plan discussed with CVTS fellow, Dr. Bryson.    Christian Rodriguez MD  PGY-3, General Surgery    - - - - - - - - - - - - - - - - - - - - - - - - - - - - - - - - - - - - - - - - - - - - - - - - - - - - - - - - - - - - - - - - - - - - - - - -   Overnight:   Less agitation last night with olanzapine.    PHYSICAL EXAMINATION:  Temp:  [97.9  F (36.6  C)-99.2  F (37.3  C)] 98.9  F (37.2  C)  Heart Rate:  [] 106  Resp:  [15-20] 20  BP: (140-168)/() 154/101  MAP:  [64 mmHg-121 mmHg] 106 mmHg  Arterial Line BP: ()/(51-96) 153/75  SpO2:  [89 %-100 %] 99 %  General: inappropriate affect. Belligerent. Improving orientation.   HEENT: Normocephalic, atraumatic. Left sided facial droop (old)  Chest wall: Symmetric thorax. Median sternotomy dressing c/d/i.   Respiratory: Equal BS bilateraly. Copious secretions. Ineffective cough.  Cardiovascular: RRR, chest tubes draining serosanguinous fluid.   Gastrointestinal: Abdomen soft, non-distended.  Genitourinary: Espinoza catheter in place.   Extremities: no peripheral LE edema. Right sided weakness  UE.  Skin: As noted above. No rashes or lesions appreciated.   Lines: R IJ CVC, L radial arterial line, R PICC      Recent Labs  Lab 12/22/17  1354 12/21/17  0154 12/20/17  2056 12/20/17  1730   PH 7.43 7.40 7.41 7.44   PCO2 41 42 40 34*   PO2 130* 146* 160* 107*   HCO3 28 26 25 23     Complete Blood Count     Recent Labs  Lab 12/23/17  0420 12/22/17  1626 12/22/17  0401 12/21/17  0154  12/20/17  0412 12/20/17  0002   WBC 11.1*  --   --  13.4*  --  15.5* 16.3*   HGB 7.9* 7.9* 7.9* 8.2*  < > 8.0* 7.0*     --  210 250  --  358 338   < > = values in this interval not displayed.  Basic Metabolic Panel    Recent Labs  Lab 12/23/17  0420 12/22/17  1626 12/22/17  0401 12/21/17 1955 12/21/17  0154 12/20/17  0412   *  --  145*  --  143  --  144   POTASSIUM 3.8 3.7 3.8 3.6 3.9  < > 4.2   CHLORIDE 110*  --  110*  --  109  --  109   CO2 28  --  29  --  27  --  25   BUN 27  --  26  --  16  --  13   CR 1.17  --  1.63*  --  1.63*  --  1.33*   *  --  112*  --  105*  --  160*   < > = values in this interval not displayed.  Liver Function Tests    Recent Labs  Lab 12/21/17  0154 12/20/17  0748 12/19/17  1806 12/19/17  1653  12/18/17  0359 12/17/17 2024 12/17/17  0019   AST  --   --   --   --   --  32 29 33   ALT  --   --   --   --   --  30 32 34   ALKPHOS  --   --   --   --   --  92 94 102   BILITOTAL  --   --   --   --   --  1.3 1.0 1.3   ALBUMIN  --   --   --   --   --  1.5* 1.6* 1.6*   INR 1.35* 1.29* 0.90 0.81*  < > 1.12 1.10 1.06   < > = values in this interval not displayed.      Recent Labs  Lab 12/21/17  0154 12/20/17  0748 12/19/17  1806 12/19/17  1653 12/19/17  1515   INR 1.35* 1.29* 0.90 0.81* 1.56*   PTT  --  39* 52* 43* 37     Recent Results (from the past 24 hour(s))   XR Chest Port 1 View    Narrative    Exam: XR CHEST PORT 1 VW, 12/22/2017 10:39 AM    Indication: chest tubes;     Comparison: 12/21/2017    Findings:   There is a single semiupright portable view of the chest. Interval  removal of  the right IJ [place]-Amor. The right IJ Marietta-Amor sheath  projects over the superior SVC. Right sided PICC terminating in the  subclavian artery. There is an interval removal of a mediastinal  drain. The right and left chest tubes are in stable position. The  cardiomediastinal silhouette is enlarged, stable from prior. The upper  abdomen is unremarkable. There is an interval decrease of the  bilateral sided pleural effusion. No pneumothorax.      Impression    Impression:   1. Interval removal of mediastinal drain and Marietta-Amor catheter.  Support devices are otherwise stable as listed above.  2. Interval decrease of bilateral pleural effusions.    I have personally reviewed the examination and initial interpretation  and I agree with the findings.    SAMANTHA CHRISTOPHER MD

## 2017-12-24 NOTE — PLAN OF CARE
Problem: Patient Care Overview  Goal: Plan of Care/Patient Progress Review  Discharge Planner SLP   Patient plan for discharge: patient not able to state  Current status: Patient appeared somewhat more confused compared to yesterday's session. Patient with left sided facial weakness which impacts swallowing safety. Cautiously recommend the patient continue a full liquid diet with close supervision (1 small bite, sip at a time, slow pacing, must sit fully upright for PO).   Barriers to return to prior living situation: requires modified diet for safer swallowing, confusion, TF  Recommendations for discharge: TCU  Rationale for recommendations: ongoing SLP tx for dysphagia as swallowing function appears below baseline       Entered by: Mila Pena 12/24/2017 2:20 PM

## 2017-12-24 NOTE — PROGRESS NOTES
Admission          12/16/2017 11:31 PM  -----------------------------------------------------------  Reason for admission: ICU transfer from  .   Via: stretcher  Accompanied by: daughter  Belongings: Placed in closet; valuables sent home with family  Admission Profile: complete  Teaching: orientation to unit and call light- call light within reach, call don't fall, use of console, meal times, when to call for the RN, and enforced importance of safety   Access: PIV and PICC .  Telemetry:Placed on pt  Ht./Wt.: complete  2 RN Skin Assessment Completed By: Maryuri RAE and Kamilla SEWELL RN .   Pt status: alert and oriented with intermittent confusion transferred from ICU. Soft touch call light ordered. Will continue to monitor.     Temp:  [97.9  F (36.6  C)-99.2  F (37.3  C)] 98.9  F (37.2  C)  Heart Rate:  [] 106  Resp:  [15-20] 20  BP: (140-168)/() 154/101  MAP:  [64 mmHg-106 mmHg] 106 mmHg  Arterial Line BP: ()/(51-78) 153/75  SpO2:  [94 %-100 %] 99 %

## 2017-12-24 NOTE — PROGRESS NOTES
[facility]  CARDIOLOGY DAILY PROGRESS NOTE    Interval History:   More alert and oriented.  Less agitation  In general floor  No physically complaints.  Realizes that rehab will be long process  Watching football game.    Objective  Temp:  [97.7  F (36.5  C)-99  F (37.2  C)] 97.7  F (36.5  C)  Heart Rate:  [101-111] 108  Resp:  [16-24] 18  BP: ()/() 125/86  SpO2:  [95 %-100 %] 100 %     I/O last 3 completed shifts:  In: 3185 [I.V.:1360; NG/GT:330]  Out: 3375 [Urine:3295; Chest Tube:80]  Wt Readings from Last 5 Encounters:   12/23/17 83.7 kg (184 lb 8.4 oz)   12/15/17 83 kg (183 lb)   11/02/17 88.3 kg (194 lb 10.7 oz)   02/06/17 91.6 kg (202 lb)   01/09/17 87.5 kg (193 lb)     GEN: pleasant, no acute distress  HEENT: no icterus  CV: RRR, normal s1/s2, no murmurs/rubs/s3/s4, no heave. JVP at 8-9 cm.   CHEST: on 2L NC, normal work of breathing, clear to ausculation bilaterally, no rales or wheezing  ABD: soft, non-tender, normal active bowel sounds  EXTR: pulses 2+ and equal. No clubbing, cyanosis.  Trace lower extremity edema.   NEURO: slight dysarthria, left facial droop, alert, disoriented occasionally    Current Medications    isosorbide dinitrate  20 mg Oral TID     furosemide  40 mg Intravenous Once     hydrALAZINE  25 mg Oral TID     OLANZapine  5 mg Oral BID     albuterol  2.5 mg Nebulization Q4H     famotidine  20 mg Oral or Feeding Tube Daily     ceFEPIme (MAXIPIME) IV  2 g Intravenous Q8H     polyethylene glycol  17 g Oral Daily     heparin  5,000 Units Subcutaneous Q8H     multivitamins with minerals  15 mL Per Feeding Tube Daily     sodium chloride (PF)  3 mL Intracatheter Q8H     insulin aspart   Subcutaneous TID w/meals     senna-docusate  1-2 tablet Oral BID     acetylcysteine  2 mL Nebulization Q4H     mirtazapine  30 mg Oral At Bedtime     rifampin  300 mg Oral or Feeding Tube BID     ketotifen  1 drop Left Eye TID     artificial tears   Ophthalmic At Bedtime      aspirin  81 mg Oral or Feeding Tube Daily     atorvastatin  40 mg Oral Daily       IV fluid REPLACEMENT ONLY       IV fluid REPLACEMENT ONLY       Reason beta blocker order not selected       - MEDICATION INSTRUCTIONS -       Lab Values  BMP  Recent Labs  Lab 12/24/17  0427 12/23/17  0420 12/22/17  1626 12/22/17  0401  12/21/17  0154    146*  --  145*  --  143   POTASSIUM 3.9 3.8 3.7 3.8  < > 3.9   CHLORIDE 107 110*  --  110*  --  109   IZABELLA 9.0 8.4*  --  8.4*  --  8.0*   CO2 28 28  --  29  --  27   BUN 29 27  --  26  --  16   CR 0.95 1.17  --  1.63*  --  1.63*   * 121*  --  112*  --  105*   < > = values in this interval not displayed.  LFTs    Recent Labs  Lab 12/18/17 0359 12/17/17 2024   ALKPHOS 92 94   AST 32 29   ALT 30 32   BILITOTAL 1.3 1.0   PROTTOTAL 7.0 7.0   ALBUMIN 1.5* 1.6*      CBC  Recent Labs  Lab 12/23/17  0420  12/22/17  0401 12/21/17  0154  12/20/17  0412 12/20/17  0002   WBC 11.1*  --   --  13.4*  --  15.5* 16.3*   RBC 2.64*  --   --  2.72*  --  2.62* 2.28*   HGB 7.9*  < > 7.9* 8.2*  < > 8.0* 7.0*   HCT 25.5*  --   --  25.0*  --  24.0* 21.4*   MCV 97  --   --  92  --  92 94   MCH 29.9  --   --  30.1  --  30.5 30.7   MCHC 31.0*  --   --  32.8  --  33.3 32.7   RDW 18.3*  --   --  19.0*  --  18.0* 17.9*     --  210 250  --  358 338   < > = values in this interval not displayed.  INR    Recent Labs  Lab 12/21/17  0154 12/20/17  0748 12/19/17  1806 12/19/17  1653   INR 1.35* 1.29* 0.90 0.81*     Studies  EKG: low voltage, sinus rhythm     Transthoracic echocardiogram:   Left ventricular wall thickness is normal. Mild left ventricular dilation is present. The Ejection Fraction is estimated at 30-35%. Diastolic function not assessed due to tachycardia. Severe diffuse hypokinesis is present.     The right ventricle is normal size. Global right ventricular function is mildly reduced.  Both atria appear normal.  Trace mitral insufficiency is present.  The tricuspid valve is  normal.     Vessels  The patient is post bio-Bentall - aortic root replacement with a 30 mm graft and AVR with a Trifecta valve. Today, there is circumferential free space noted surrounding the aortic graft, dehiscence and rocking of the graft from the native aorta and severe AI in the space surrounding the aortic graft (perivalvular/perigraft). The circumferential free space was present on the  previous studies of October 2017 but was filled with echodense material and the graft was not independently mobile, and there was no significant AI. The prosthetic aortic valve leaflets are not well visualized today.     No pericardial effusion is present.     Compared to Previous Study  This study was compared with the study from 10/06/2017 . Since the prior study, the LV function has worsened and the aortic graft appears to be dehisced, as described above.        Assessment and Recommendation:  Cricket Miguel is a 62 year old male admitted with worsening dyspnea, thought to have acute severe AI on echocardiogram.  Pertinent history of aortic stenosis and ascending aortic aneurysm s/p AVR and root replacement April 2016.  Had complications of moises aortic abscess, endocarditis, and septic emboli to his brain.  He underwent redo sternotomy with aortic root and valve homograft 12/19.  Heart failure service is being consulted for newly reduced EF 20-25%.     Troponin mildly elevated (2), right is fine in setting of just having major surgery.  Would be much higher if ischemic induced HF.  His reduced EF is likely from the severe AI and recent surgery.  I imagine his EF should improve over time if he is able to maintain euvolemia and be on evidence based therapies. I ordered isordil/hydralazine combo 20/25 and IV lasix.    If his pressures can tolerate that afterwards, then can start coreg or toprol XL.      I have discussed the above with Dr. Browne.     Thank you for consulting the cardiovascular services at the [facility]  [facility]. Please do not hesitate to call us with any questions.      Pardeep Cruz MD PGY4  Cardiology Fellow  [phone]        I have reviewed today's vital signs, notes, medications, labs and imaging. I have also seen and examined the patient and agree with the findings and plan as outlined above.    Shakila Browne MD  Section Head - Advanced Heart Failure, Transplantation and Mechanical Circulatory Support  Co-Director - [facility]  Associate Professor of Medicine, [facility]

## 2017-12-25 ENCOUNTER — APPOINTMENT (OUTPATIENT)
Dept: GENERAL RADIOLOGY | Facility: CLINIC | Age: 62
DRG: 219 | End: 2017-12-25
Attending: SURGERY
Payer: COMMERCIAL

## 2017-12-25 LAB
ALBUMIN UR-MCNC: 30 MG/DL
ANION GAP SERPL CALCULATED.3IONS-SCNC: 7 MMOL/L (ref 3–14)
APPEARANCE UR: ABNORMAL
BACTERIA #/AREA URNS HPF: ABNORMAL /HPF
BILIRUB UR QL STRIP: NEGATIVE
BUN SERPL-MCNC: 29 MG/DL (ref 7–30)
CALCIUM SERPL-MCNC: 8.9 MG/DL (ref 8.5–10.1)
CHLORIDE SERPL-SCNC: 106 MMOL/L (ref 94–109)
CO2 SERPL-SCNC: 26 MMOL/L (ref 20–32)
COLOR UR AUTO: YELLOW
CREAT SERPL-MCNC: 0.8 MG/DL (ref 0.66–1.25)
CRP SERPL-MCNC: 90 MG/L (ref 0–8)
ERYTHROCYTE [DISTWIDTH] IN BLOOD BY AUTOMATED COUNT: 18.2 % (ref 10–15)
GFR SERPL CREATININE-BSD FRML MDRD: >90 ML/MIN/1.7M2
GLUCOSE BLDC GLUCOMTR-MCNC: 121 MG/DL (ref 70–99)
GLUCOSE BLDC GLUCOMTR-MCNC: 134 MG/DL (ref 70–99)
GLUCOSE BLDC GLUCOMTR-MCNC: 155 MG/DL (ref 70–99)
GLUCOSE BLDC GLUCOMTR-MCNC: 157 MG/DL (ref 70–99)
GLUCOSE BLDC GLUCOMTR-MCNC: 160 MG/DL (ref 70–99)
GLUCOSE SERPL-MCNC: 146 MG/DL (ref 70–99)
GLUCOSE UR STRIP-MCNC: NEGATIVE MG/DL
HCT VFR BLD AUTO: 28.1 % (ref 40–53)
HGB BLD-MCNC: 8.6 G/DL (ref 13.3–17.7)
HGB UR QL STRIP: NEGATIVE
KETONES UR STRIP-MCNC: NEGATIVE MG/DL
LACTATE BLD-SCNC: 0.8 MMOL/L (ref 0.7–2)
LEUKOCYTE ESTERASE UR QL STRIP: NEGATIVE
MAGNESIUM SERPL-MCNC: 2 MG/DL (ref 1.6–2.3)
MCH RBC QN AUTO: 30.4 PG (ref 26.5–33)
MCHC RBC AUTO-ENTMCNC: 30.6 G/DL (ref 31.5–36.5)
MCV RBC AUTO: 99 FL (ref 78–100)
MUCOUS THREADS #/AREA URNS LPF: PRESENT /LPF
NITRATE UR QL: NEGATIVE
PH UR STRIP: 8 PH (ref 5–7)
PHOSPHATE SERPL-MCNC: 1.9 MG/DL (ref 2.5–4.5)
PLATELET # BLD AUTO: 364 10E9/L (ref 150–450)
POTASSIUM SERPL-SCNC: 4 MMOL/L (ref 3.4–5.3)
RBC # BLD AUTO: 2.83 10E12/L (ref 4.4–5.9)
RBC #/AREA URNS AUTO: <1 /HPF (ref 0–2)
SODIUM SERPL-SCNC: 140 MMOL/L (ref 133–144)
SOURCE: ABNORMAL
SP GR UR STRIP: 1.01 (ref 1–1.03)
TRANS CELLS #/AREA URNS HPF: <1 /HPF (ref 0–1)
UROBILINOGEN UR STRIP-MCNC: NORMAL MG/DL (ref 0–2)
WBC # BLD AUTO: 14.1 10E9/L (ref 4–11)
WBC #/AREA URNS AUTO: 2 /HPF (ref 0–2)

## 2017-12-25 PROCEDURE — 25000132 ZZH RX MED GY IP 250 OP 250 PS 637: Performed by: INTERNAL MEDICINE

## 2017-12-25 PROCEDURE — 25000128 H RX IP 250 OP 636

## 2017-12-25 PROCEDURE — 36592 COLLECT BLOOD FROM PICC: CPT | Performed by: PHYSICIAN ASSISTANT

## 2017-12-25 PROCEDURE — 25000128 H RX IP 250 OP 636: Performed by: THORACIC SURGERY (CARDIOTHORACIC VASCULAR SURGERY)

## 2017-12-25 PROCEDURE — 81001 URINALYSIS AUTO W/SCOPE: CPT | Performed by: PHYSICIAN ASSISTANT

## 2017-12-25 PROCEDURE — 27210437 ZZH NUTRITION PRODUCT SEMIELEM INTERMED LITER

## 2017-12-25 PROCEDURE — 85027 COMPLETE CBC AUTOMATED: CPT | Performed by: PHYSICIAN ASSISTANT

## 2017-12-25 PROCEDURE — 40000275 ZZH STATISTIC RCP TIME EA 10 MIN

## 2017-12-25 PROCEDURE — 25000132 ZZH RX MED GY IP 250 OP 250 PS 637: Performed by: PHYSICIAN ASSISTANT

## 2017-12-25 PROCEDURE — 25000125 ZZHC RX 250: Performed by: SURGERY

## 2017-12-25 PROCEDURE — 12000006 ZZH R&B IMCU INTERMEDIATE UMMC

## 2017-12-25 PROCEDURE — 84100 ASSAY OF PHOSPHORUS: CPT | Performed by: STUDENT IN AN ORGANIZED HEALTH CARE EDUCATION/TRAINING PROGRAM

## 2017-12-25 PROCEDURE — 25000132 ZZH RX MED GY IP 250 OP 250 PS 637: Performed by: SURGERY

## 2017-12-25 PROCEDURE — 83605 ASSAY OF LACTIC ACID: CPT | Performed by: STUDENT IN AN ORGANIZED HEALTH CARE EDUCATION/TRAINING PROGRAM

## 2017-12-25 PROCEDURE — 87040 BLOOD CULTURE FOR BACTERIA: CPT | Performed by: PHYSICIAN ASSISTANT

## 2017-12-25 PROCEDURE — 36415 COLL VENOUS BLD VENIPUNCTURE: CPT | Performed by: PHYSICIAN ASSISTANT

## 2017-12-25 PROCEDURE — 00000146 ZZHCL STATISTIC GLUCOSE BY METER IP

## 2017-12-25 PROCEDURE — 94668 MNPJ CHEST WALL SBSQ: CPT

## 2017-12-25 PROCEDURE — 25000132 ZZH RX MED GY IP 250 OP 250 PS 637: Performed by: THORACIC SURGERY (CARDIOTHORACIC VASCULAR SURGERY)

## 2017-12-25 PROCEDURE — 83735 ASSAY OF MAGNESIUM: CPT | Performed by: PHYSICIAN ASSISTANT

## 2017-12-25 PROCEDURE — 25000132 ZZH RX MED GY IP 250 OP 250 PS 637: Performed by: HOSPITALIST

## 2017-12-25 PROCEDURE — 25000125 ZZHC RX 250: Performed by: THORACIC SURGERY (CARDIOTHORACIC VASCULAR SURGERY)

## 2017-12-25 PROCEDURE — 94640 AIRWAY INHALATION TREATMENT: CPT | Mod: 76

## 2017-12-25 PROCEDURE — 25000132 ZZH RX MED GY IP 250 OP 250 PS 637: Performed by: ANESTHESIOLOGY

## 2017-12-25 PROCEDURE — 80048 BASIC METABOLIC PNL TOTAL CA: CPT | Performed by: PHYSICIAN ASSISTANT

## 2017-12-25 PROCEDURE — 25000128 H RX IP 250 OP 636: Performed by: PHYSICIAN ASSISTANT

## 2017-12-25 PROCEDURE — 25000125 ZZHC RX 250: Performed by: STUDENT IN AN ORGANIZED HEALTH CARE EDUCATION/TRAINING PROGRAM

## 2017-12-25 PROCEDURE — 86140 C-REACTIVE PROTEIN: CPT | Performed by: STUDENT IN AN ORGANIZED HEALTH CARE EDUCATION/TRAINING PROGRAM

## 2017-12-25 PROCEDURE — 71010 XR CHEST PORT 1 VW: CPT

## 2017-12-25 RX ORDER — METOPROLOL SUCCINATE 25 MG/1
25 TABLET, EXTENDED RELEASE ORAL DAILY
Status: DISCONTINUED | OUTPATIENT
Start: 2017-12-25 | End: 2017-12-25

## 2017-12-25 RX ORDER — FUROSEMIDE 40 MG
40 TABLET ORAL 2 TIMES DAILY
Status: DISCONTINUED | OUTPATIENT
Start: 2017-12-26 | End: 2017-12-26

## 2017-12-25 RX ORDER — FUROSEMIDE 10 MG/ML
40 INJECTION INTRAMUSCULAR; INTRAVENOUS ONCE
Status: COMPLETED | OUTPATIENT
Start: 2017-12-25 | End: 2017-12-25

## 2017-12-25 RX ADMIN — HYDRALAZINE HYDROCHLORIDE 25 MG: 25 TABLET ORAL at 14:13

## 2017-12-25 RX ADMIN — Medication 2 G: at 16:51

## 2017-12-25 RX ADMIN — Medication 300 MG: at 09:50

## 2017-12-25 RX ADMIN — CEFEPIME 2 G: 1 INJECTION, SOLUTION INTRAVENOUS at 03:00

## 2017-12-25 RX ADMIN — HYDRALAZINE HYDROCHLORIDE 25 MG: 25 TABLET ORAL at 10:06

## 2017-12-25 RX ADMIN — POTASSIUM CHLORIDE 20 MEQ: 1.5 POWDER, FOR SOLUTION ORAL at 12:41

## 2017-12-25 RX ADMIN — CEFEPIME 2 G: 1 INJECTION, SOLUTION INTRAVENOUS at 12:33

## 2017-12-25 RX ADMIN — ALBUTEROL SULFATE 2.5 MG: 2.5 SOLUTION RESPIRATORY (INHALATION) at 09:53

## 2017-12-25 RX ADMIN — POTASSIUM PHOSPHATE, MONOBASIC AND POTASSIUM PHOSPHATE, DIBASIC 20 MMOL: 224; 236 INJECTION, SOLUTION INTRAVENOUS at 21:46

## 2017-12-25 RX ADMIN — MULTIVITAMIN 15 ML: LIQUID ORAL at 09:52

## 2017-12-25 RX ADMIN — FAMOTIDINE 20 MG: 40 POWDER, FOR SUSPENSION ORAL at 09:52

## 2017-12-25 RX ADMIN — HYDRALAZINE HYDROCHLORIDE 25 MG: 25 TABLET ORAL at 20:40

## 2017-12-25 RX ADMIN — KETOTIFEN FUMARATE 1 DROP: 0.25 SOLUTION/ DROPS OPHTHALMIC at 09:52

## 2017-12-25 RX ADMIN — HEPARIN SODIUM 5000 UNITS: 5000 INJECTION, SOLUTION INTRAVENOUS; SUBCUTANEOUS at 09:52

## 2017-12-25 RX ADMIN — SENNOSIDES AND DOCUSATE SODIUM 1 TABLET: 8.6; 5 TABLET ORAL at 20:40

## 2017-12-25 RX ADMIN — ACETYLCYSTEINE 2 ML: 100 SOLUTION ORAL; RESPIRATORY (INHALATION) at 09:52

## 2017-12-25 RX ADMIN — HEPARIN SODIUM 5000 UNITS: 5000 INJECTION, SOLUTION INTRAVENOUS; SUBCUTANEOUS at 02:59

## 2017-12-25 RX ADMIN — ISOSORBIDE DINITRATE 20 MG: 5 TABLET ORAL at 20:40

## 2017-12-25 RX ADMIN — ISOSORBIDE DINITRATE 20 MG: 5 TABLET ORAL at 14:13

## 2017-12-25 RX ADMIN — ACETYLCYSTEINE 2 ML: 100 SOLUTION ORAL; RESPIRATORY (INHALATION) at 12:01

## 2017-12-25 RX ADMIN — ALBUTEROL SULFATE 2.5 MG: 2.5 SOLUTION RESPIRATORY (INHALATION) at 15:58

## 2017-12-25 RX ADMIN — MINERAL OIL AND WHITE PETROLATUM: 150; 830 OINTMENT OPHTHALMIC at 22:28

## 2017-12-25 RX ADMIN — ATORVASTATIN CALCIUM 40 MG: 40 TABLET, FILM COATED ORAL at 09:51

## 2017-12-25 RX ADMIN — OLANZAPINE 5 MG: 5 TABLET, FILM COATED ORAL at 09:51

## 2017-12-25 RX ADMIN — KETOTIFEN FUMARATE 1 DROP: 0.25 SOLUTION/ DROPS OPHTHALMIC at 14:13

## 2017-12-25 RX ADMIN — FUROSEMIDE 40 MG: 10 INJECTION, SOLUTION INTRAVENOUS at 12:28

## 2017-12-25 RX ADMIN — ALBUTEROL SULFATE 2.5 MG: 2.5 SOLUTION RESPIRATORY (INHALATION) at 12:01

## 2017-12-25 RX ADMIN — Medication 300 MG: at 20:41

## 2017-12-25 RX ADMIN — MIRTAZAPINE 30 MG: 15 TABLET, FILM COATED ORAL at 22:21

## 2017-12-25 RX ADMIN — ACETYLCYSTEINE 2 ML: 100 SOLUTION ORAL; RESPIRATORY (INHALATION) at 15:58

## 2017-12-25 RX ADMIN — HYDRALAZINE HYDROCHLORIDE 25 MG: 25 TABLET ORAL at 05:38

## 2017-12-25 RX ADMIN — KETOTIFEN FUMARATE 1 DROP: 0.25 SOLUTION/ DROPS OPHTHALMIC at 20:40

## 2017-12-25 RX ADMIN — OLANZAPINE 5 MG: 5 TABLET, FILM COATED ORAL at 20:39

## 2017-12-25 RX ADMIN — ACETYLCYSTEINE 2 ML: 100 SOLUTION ORAL; RESPIRATORY (INHALATION) at 20:30

## 2017-12-25 RX ADMIN — ISOSORBIDE DINITRATE 20 MG: 5 TABLET ORAL at 09:50

## 2017-12-25 RX ADMIN — CEFEPIME 2 G: 1 INJECTION, SOLUTION INTRAVENOUS at 20:37

## 2017-12-25 RX ADMIN — SENNOSIDES AND DOCUSATE SODIUM 1 TABLET: 8.6; 5 TABLET ORAL at 09:51

## 2017-12-25 RX ADMIN — ALBUTEROL SULFATE 2.5 MG: 2.5 SOLUTION RESPIRATORY (INHALATION) at 20:30

## 2017-12-25 RX ADMIN — HEPARIN SODIUM 5000 UNITS: 5000 INJECTION, SOLUTION INTRAVENOUS; SUBCUTANEOUS at 16:51

## 2017-12-25 RX ADMIN — ASPIRIN 81 MG CHEWABLE TABLET 81 MG: 81 TABLET CHEWABLE at 09:51

## 2017-12-25 ASSESSMENT — ACTIVITIES OF DAILY LIVING (ADL)
ADLS_ACUITY_SCORE: 34
ADLS_ACUITY_SCORE: 32
ADLS_ACUITY_SCORE: 32
ADLS_ACUITY_SCORE: 36
ADLS_ACUITY_SCORE: 34
ADLS_ACUITY_SCORE: 36

## 2017-12-25 NOTE — PROGRESS NOTES
CARDIOTHORACIC SURGERY PROGRESS NOTE: 12/24/2017    ASSESSMENT: Cricket Miguel is a 62 year old male with hx of COPD, HTN, HLD, CAD, heart failure with preserved EF, aortic stenosis and ascending aortic aneurysm s/p aortic valve replacement and aortic root replacement (April 2016) c/b moises-aortic abscess, endocarditis, cerebral septic emboli and severe AI s/p redo-sternotomy aortic root and valve homograft 12/19.       PLAN:   Neuro/ pain/ sedation: Hx of multifocal acute infarctions involving the left parietotemporal lobes, left cerebral hemisphere and right occipital lobe presumed to be septic emboli. Depression, mild delirium, followed by psych.  -Monitor neurological status. Notify the MD for any acute changes in exam.  -Dilaudid PRN.  -Mental status improving.  - On psych rec started olanzapine. Agitation improving. Serial EKGs to evaluate QT interval.     Pulmonary care: extubated 12/20  - On facemask 2 LPM saturation 100%.  - Encourage pulmonary toilet  - Nasotracheal suctioning     Cardiovascular:  HTN, HLD, CAD, AS and aortic aneurysm s/p bentall c/b endocarditis and severe AI s/p aortic root/valve homograft 12/19.  -Keep SBP<120  -Isordil/hydralazine started per cardiology team  -Lasix IV  -ASA 81mg and atorvastatin.  -Echo shows EF 20-25% baseline 30%     GI care: PEG  -famotidine  -senna/colace/miralax     Fluids/ Electrolytes/ Nutrition:   -PRN electrolyte replacement  -No indication for parenteral nutrition.     Renal/ Fluid Balance: Urine output is adequate so far.  -Espinoza catheter for strict intake and output.  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 Hx of MSSA septic arthritis and bacteremia. Hx of multifocal acute infarctions involving the left parietotemporal lobes, left cerebral hemisphere and right occipital lobe presumed to be septic emboli. ID following.   -New cultures BAL growing  pseudomonas aeruginosa. ID following. Continue cefepime/rifampin.   -F/u surgical tissue cultures     Heme: acute blood loss anemia  - CBC stable.     Prophylaxis:    -Mechanical prophylaxis for DVT.   -Heparin TID.      Lines/ tubes/ drains:  -R triple lumen PICC, Mary garza     Disposition:  -6C since 12/23     Patient seen, findings and plan discussed with CVTS staff, Dr. Kuo.    Chritsian Rodriguez MD  PGY-3, General Surgery    - - - - - - - - - - - - - - - - - - - - - - - - - - - - - - - - - - - - - - - - - - - - - - - - - - - - - - - - - - - - - - - - - - - - - - - -   Overnight:   Agitation significantly improved. More alert and oriented. Denies pain.     PHYSICAL EXAMINATION:  Temp:  [97.7  F (36.5  C)-99  F (37.2  C)] 97.7  F (36.5  C)  Heart Rate:  [103-111] 108  Resp:  [18-24] 18  BP: ()/() 125/86  SpO2:  [95 %-100 %] 100 %  General: more alert and oriented today  HEENT: Normocephalic, atraumatic. Left sided facial droop  Chest wall: Symmetric thorax. Median sternotomy C/D/I without erythema  Respiratory: Equal BS bilateraly. Cough improving  Cardiovascular: RRR, chest tubes draining serosanguinous fluid.   Gastrointestinal: Abdomen soft, non-distended.  Genitourinary: Garza catheter in place.   Extremities: no peripheral LE edema. Right sided weakness UE.  Skin: As noted above. No rashes or lesions appreciated.   Lines: R PICC      Recent Labs  Lab 12/22/17  1354 12/21/17  0154 12/20/17  2056 12/20/17  1730   PH 7.43 7.40 7.41 7.44   PCO2 41 42 40 34*   PO2 130* 146* 160* 107*   HCO3 28 26 25 23     Complete Blood Count     Recent Labs  Lab 12/23/17  0420 12/22/17  1626 12/22/17  0401 12/21/17  0154  12/20/17  0412 12/20/17  0002   WBC 11.1*  --   --  13.4*  --  15.5* 16.3*   HGB 7.9* 7.9* 7.9* 8.2*  < > 8.0* 7.0*     --  210 250  --  358 338   < > = values in this interval not displayed.  Basic Metabolic Panel    Recent Labs  Lab 12/24/17  0427 12/23/17  0420 12/22/17  7528  12/22/17  0401  12/21/17  0154    146*  --  145*  --  143   POTASSIUM 3.9 3.8 3.7 3.8  < > 3.9   CHLORIDE 107 110*  --  110*  --  109   CO2 28 28  --  29  --  27   BUN 29 27  --  26  --  16   CR 0.95 1.17  --  1.63*  --  1.63*   * 121*  --  112*  --  105*   < > = values in this interval not displayed.  Liver Function Tests    Recent Labs  Lab 12/21/17  0154 12/20/17  0748 12/19/17  1806 12/19/17  1653  12/18/17  0359 12/17/17  2024   AST  --   --   --   --   --  32 29   ALT  --   --   --   --   --  30 32   ALKPHOS  --   --   --   --   --  92 94   BILITOTAL  --   --   --   --   --  1.3 1.0   ALBUMIN  --   --   --   --   --  1.5* 1.6*   INR 1.35* 1.29* 0.90 0.81*  < > 1.12 1.10   < > = values in this interval not displayed.      Recent Labs  Lab 12/21/17  0154 12/20/17  0748 12/19/17 1806 12/19/17 1653 12/19/17  1515   INR 1.35* 1.29* 0.90 0.81* 1.56*   PTT  --  39* 52* 43* 37     Recent Results (from the past 24 hour(s))   XR Chest Port 1 View    Narrative    Exam: XR CHEST PORT 1 VW, 12/22/2017 10:39 AM    Indication: chest tubes;     Comparison: 12/21/2017    Findings:   There is a single semiupright portable view of the chest. Interval  removal of the right IJ Oglesby-Amor. The right IJ Oglesby-Amor sheath  projects over the superior SVC. Right sided PICC terminating in the  subclavian artery. There is an interval removal of a mediastinal  drain. The right and left chest tubes are in stable position. The  cardiomediastinal silhouette is enlarged, stable from prior. The upper  abdomen is unremarkable. There is an interval decrease of the  bilateral sided pleural effusion. No pneumothorax.      Impression    Impression:   1. Interval removal of mediastinal drain and Oglesby-Amor catheter.  Support devices are otherwise stable as listed above.  2. Interval decrease of bilateral pleural effusions.    I have personally reviewed the examination and initial interpretation  and I agree with the findings.    SAMANTHA  MD CANDI

## 2017-12-25 NOTE — PLAN OF CARE
Problem: Patient Care Overview  Goal: Plan of Care/Patient Progress Review  Significant other (Meghna) has questions concerning how long gtube stays and when diet can be advanced. Also requesting cycled vs continuous tubefeeds.

## 2017-12-25 NOTE — PLAN OF CARE
Problem: Patient Care Overview  Goal: Plan of Care/Patient Progress Review  Outcome: No Change  Neuro: A&O x 4; Withdrawn and at times refuses to answer questions or follow commands. L side facial droop.   Cardiac: ST. BPs up to 150/104 (114) at 0400. See provider notification note. Afebrile.   Respiratory: Switched to RA at 0000. Sating >93. Intermittent weak nonproductive, congested cough.   GI/: BM 12/24. Condom cath with adequate output. +bowel sounds, passing gas.   Diet/Appetite: Full liquid diet, tolerating well.   Activity: AST of 2 up to chair. Pt has remained in bed this shift. Was unwilling to engage while attempting to assess motor strength and movement. R side appears weaker than left.   Pain: Well controlled on current regime.   Skin: No new changes noted. Continue to turn Q2hour.   LDAs: R triple lumen PICC. L forearm PIV. 2 CT both to -20 suction, no output this shift. G tube with TF at 65ml/hour.     Continue with POC. Notify primary team with changes.   Sara Dallas RN

## 2017-12-25 NOTE — PROGRESS NOTES
"CVTS Progress Note  Attending provider: Pili Bowers,*      SUBJECTIVE:    No acute issues over night.   No acute complaints, reports \"everything is good\". Pain well controlled.  Patient denies SOB, CP, fever, chills, sweats.     Patient has been tolerating diet. + BM. + flatus.  NSR to sinus tach.       OBJECTIVE:   Temp:  [97.7  F (36.5  C)-99.1  F (37.3  C)] 98.5  F (36.9  C)  Pulse:  [113] 113  Heart Rate:  [104-112] 112  Resp:  [16-20] 18  BP: (122-152)/() 123/90  SpO2:  [95 %-100 %] 95 %    Gen: A&Ox3, NAD  Neck: + JVD   CV: RRR, normal S1, S2, no murmurs, rubs or gallops   Pulm: Fine crackles in bases, otherwise CTA, No wheezing or rhonchi  Abd: Soft, NT, ND, +BS  Ext: Trace LE edema  Neuro: Nonfocal  Incision: c/d/i, no erythema, sternum stable  Tubes/drains: Dressing clean and dry, 75cc serosanguinous output, switched to waterseal, no air leak.     I&O's:  I/O last 3 completed shifts:  In: 3610 [P.O.:500; I.V.:1225; NG/GT:390]  Out: 3460 [Urine:3425; Chest Tube:35]    Labs:   BMP    Recent Labs  Lab 12/25/17  0951 12/24/17  0427 12/23/17  0420 12/22/17  1626 12/22/17  0401    142 146*  --  145*   POTASSIUM 4.0 3.9 3.8 3.7 3.8   CHLORIDE 106 107 110*  --  110*   IZABELLA 8.9 9.0 8.4*  --  8.4*   CO2 26 28 28  --  29   BUN 29 29 27  --  26   CR 0.80 0.95 1.17  --  1.63*   * 142* 121*  --  112*     CBC  Recent Labs  Lab 12/25/17  0809 12/23/17  0420 12/22/17  1626 12/22/17  0401 12/21/17  0154 12/20/17  0412   WBC 14.1* 11.1*  --   --  13.4*  --  15.5*   RBC 2.83* 2.64*  --   --  2.72*  --  2.62*   HGB 8.6* 7.9* 7.9* 7.9* 8.2*  < > 8.0*   HCT 28.1* 25.5*  --   --  25.0*  --  24.0*   MCV 99 97  --   --  92  --  92   MCH 30.4 29.9  --   --  30.1  --  30.5   MCHC 30.6* 31.0*  --   --  32.8  --  33.3   RDW 18.2* 18.3*  --   --  19.0*  --  18.0*    270  --  210 250  --  358   < > = values in this interval not displayed.  INR    Recent Labs  Lab 12/21/17 0154 12/20/17  0748 " 12/19/17  1806 12/19/17  1653   INR 1.35* 1.29* 0.90 0.81*      Hepatic Panel No lab results found in last 7 days.  Glucose  Recent Labs  Lab 12/25/17  1004 12/25/17  0951 12/25/17  0306 12/24/17  0803 12/24/17  0427 12/23/17  1923 12/23/17  1132 12/23/17  0821 12/23/17  0420  12/22/17  0401  12/21/17  0154  12/20/17  0412   GLC  --  146*  --   --  142*  --   --   --  121*  --  112*  --  105*  --  160*   *  --  155* 157*  --  126* 96 110*  --   < >  --   < >  --   < >  --    < > = values in this interval not displayed.    Imaging:   CXR 12/25:  1. Small left pleural effusion and associated retrocardiac  subsegmental atelectasis and/or consolidation.  2. Stable postsurgical changes and supporting devices.   3. Stable pulmonary vascular congestion.      ASSESSMENT: Cricket Miguel is a 62 year old male with hx of COPD, HTN, HLD, CAD, heart failure with preserved EF, aortic stenosis and ascending aortic aneurysm s/p aortic valve replacement and aortic root replacement (April 2016) c/b moises-aortic abscess, endocarditis, cerebral septic emboli and severe AI s/p redo-sternotomy aortic root and valve homograft 12/19.       PLAN:   Neuro/ pain/ sedation: Hx of multifocal acute infarctions involving the left parietotemporal lobes, left cerebral hemisphere and right occipital lobe presumed to be septic emboli. Depression, mild delirium, followed by psych.  -Monitor neurological status. Notify the MD for any acute changes in exam.  -Dilaudid PRN.  -Mental status improving.  - On psych rec started olanzapine. Agitation improving. Serial EKGs to evaluate QT interval. Last QTc 437     Pulmonary care: extubated 12/20  - On facemask 2 LPM saturation 100%.  - Encourage pulmonary toilet  - Nasotracheal suctioning  - Bilateral pleural chest tubes, will switch to waterseal today, CXR in am.       Cardiovascular:  HTN, HLD, CAD, AS and aortic aneurysm s/p bentall c/b endocarditis and severe AI s/p aortic root/valve homograft  12/19.  -Keep SBP<120  -Isordil/hydralazine started per cardiology team for elevated MAPs  -ASA 81mg and atorvastatin.  -Echo shows EF 20-25% baseline 30%   - TPW capped     GI care: PEG  -famotidine  -senna/colace/miralax, +BM      Fluids/ Electrolytes/ Nutrition:   -PRN electrolyte replacement  -No indication for parenteral nutrition.     Renal/ Fluid Balance: Urine output is adequate so far.  -Garza catheter for strict intake and output.  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 Hx of MSSA septic arthritis and bacteremia. Hx of multifocal acute infarctions involving the left parietotemporal lobes, left cerebral hemisphere and right occipital lobe presumed to be septic emboli. ID following.   -New cultures BAL growing pseudomonas aeruginosa. ID following. Continue cefepime/rifampin. WBC 11->14, patient stable, lactate normal, no signs or symptoms of infection, check sputum and UA, on abx, if continues to rise, will broaden abx   -F/u surgical tissue cultures      Heme: acute blood loss anemia  - Hgb stable.      Prophylaxis:    -Mechanical prophylaxis for DVT.   -Heparin TID.       Lines/ tubes/ drains:  -R triple lumen PICC, garza, PIVs      Disposition:  -6B since 12/23     Staff surgeons have been informed of changes through both verbal and written communication.         Benny Unger PA-C  Cardiothoracic Surgery  December 25, 2017 7:26 AM   p: [phone]

## 2017-12-25 NOTE — PLAN OF CARE
Problem: Patient Care Overview  Goal: Plan of Care/Patient Progress Review  Outcome: No Change  A & O x3, speech garbled. RUE  No movement. Left facial droop. Denies pain. VSS. -110's. Triggered Sepsis protocol. Lactic 0.8. Tolerating TF at goal 65ml/hr. Incontinence of urine x3 and stool x2. Condom cath not staying on. Team just ordered C-diff, will send sample next time he has a bm. CT x2 placed to water seal today. K+ and Mag replaced.

## 2017-12-25 NOTE — PROGRESS NOTES
[facility]  CARDIOLOGY DAILY PROGRESS NOTE    Interval History:   More tired today compared to yesterday.  No chest pain or trouble breathing.    Objective  Temp:  [97.7  F (36.5  C)-99.1  F (37.3  C)] 98.7  F (37.1  C)  Heart Rate:  [104-112] 112  Resp:  [16-20] 18  BP: (122-152)/() 147/100  SpO2:  [97 %-100 %] 97 %     I/O last 3 completed shifts:  In: 3610 [P.O.:500; I.V.:1225; NG/GT:390]  Out: 3460 [Urine:3425; Chest Tube:35]     Wt Readings from Last 5 Encounters:   12/25/17 80.9 kg (178 lb 5.6 oz)   12/15/17 83 kg (183 lb)   11/02/17 88.3 kg (194 lb 10.7 oz)   02/06/17 91.6 kg (202 lb)   01/09/17 87.5 kg (193 lb)     GEN: no acute distress  HEENT: no icterus  CV: RRR, normal s1/s2, no murmurs/rubs/s3/s4, no heave. JVP at 8-9 cm.   CHEST: on 2L NC, normal work of breathing, clear to ausculation bilaterally, no rales or wheezing  ABD: soft, non-tender, normal active bowel sounds  EXTR: pulses 2+ and equal. No clubbing, cyanosis.  Trace lower extremity edema.   NEURO: slight dysarthria, left facial droop, disoriented occasionally    Current Medications    isosorbide dinitrate  20 mg Oral TID     hydrALAZINE  25 mg Oral TID     OLANZapine  5 mg Oral BID     albuterol  2.5 mg Nebulization Q4H     famotidine  20 mg Oral or Feeding Tube Daily     ceFEPIme (MAXIPIME) IV  2 g Intravenous Q8H     polyethylene glycol  17 g Oral Daily     heparin  5,000 Units Subcutaneous Q8H     multivitamins with minerals  15 mL Per Feeding Tube Daily     sodium chloride (PF)  3 mL Intracatheter Q8H     insulin aspart   Subcutaneous TID w/meals     senna-docusate  1-2 tablet Oral BID     acetylcysteine  2 mL Nebulization Q4H     mirtazapine  30 mg Oral At Bedtime     rifampin  300 mg Oral or Feeding Tube BID     ketotifen  1 drop Left Eye TID     artificial tears   Ophthalmic At Bedtime     aspirin  81 mg Oral or Feeding Tube Daily     atorvastatin  40 mg Oral Daily       IV fluid REPLACEMENT ONLY        IV fluid REPLACEMENT ONLY       Reason beta blocker order not selected       - MEDICATION INSTRUCTIONS -       Lab Values  BMP  Recent Labs  Lab 12/24/17  0427 12/23/17  0420 12/22/17  1626 12/22/17  0401  12/21/17  0154    146*  --  145*  --  143   POTASSIUM 3.9 3.8 3.7 3.8  < > 3.9   CHLORIDE 107 110*  --  110*  --  109   IZABELLA 9.0 8.4*  --  8.4*  --  8.0*   CO2 28 28  --  29  --  27   BUN 29 27  --  26  --  16   CR 0.95 1.17  --  1.63*  --  1.63*   * 121*  --  112*  --  105*   < > = values in this interval not displayed.    CBC  Recent Labs  Lab 12/23/17  0420  12/22/17  0401 12/21/17  0154  12/20/17  0412 12/20/17  0002   WBC 11.1*  --   --  13.4*  --  15.5* 16.3*   RBC 2.64*  --   --  2.72*  --  2.62* 2.28*   HGB 7.9*  < > 7.9* 8.2*  < > 8.0* 7.0*   HCT 25.5*  --   --  25.0*  --  24.0* 21.4*   MCV 97  --   --  92  --  92 94   MCH 29.9  --   --  30.1  --  30.5 30.7   MCHC 31.0*  --   --  32.8  --  33.3 32.7   RDW 18.3*  --   --  19.0*  --  18.0* 17.9*     --  210 250  --  358 338   < > = values in this interval not displayed.  INR    Recent Labs  Lab 12/21/17  0154 12/20/17  0748 12/19/17  1806 12/19/17  1653   INR 1.35* 1.29* 0.90 0.81*     Studies  EKG: low voltage, sinus rhythm     Transthoracic echocardiogram:   Left ventricular wall thickness is normal. Mild left ventricular dilation is present. The Ejection Fraction is estimated at 30-35%. Diastolic function not assessed due to tachycardia. Severe diffuse hypokinesis is present.     The right ventricle is normal size. Global right ventricular function is mildly reduced.  Both atria appear normal.  Trace mitral insufficiency is present.  The tricuspid valve is normal.     Vessels  The patient is post bio-Bentall - aortic root replacement with a 30 mm graft and AVR with a Trifecta valve. Today, there is circumferential free space noted surrounding the aortic graft, dehiscence and rocking of the graft from the native aorta and severe AI in  the space surrounding the aortic graft (perivalvular/perigraft). The circumferential free space was present on the  previous studies of October 2017 but was filled with echodense material and the graft was not independently mobile, and there was no significant AI. The prosthetic aortic valve leaflets are not well visualized today.     No pericardial effusion is present.     Compared to Previous Study  This study was compared with the study from 10/06/2017 . Since the prior study, the LV function has worsened and the aortic graft appears to be dehisced, as described above.        Assessment and Recommendation:  Cricket Miguel is a 62 year old male admitted with worsening dyspnea, thought to have acute severe AI on echocardiogram.  Pertinent history of aortic stenosis and ascending aortic aneurysm s/p AVR and root replacement April 2016.  Had complications of omises aortic abscess, endocarditis, and septic emboli to his brain.  He underwent redo sternotomy with aortic root and valve homograft 12/19.  Heart failure service is being consulted for newly reduced EF 20-25%.  Most likely related to  severe AI and recent surgery, and should improve over time.  Does not appear ischemic.     On isordil/hydral combo of 20/25.  Had some elevated BP overnight and then some lower BP this morning.  Overall tolerating medications just fine.  Will leave doses as they are today.  I ordered scheduled 40mg BID lasix PO to start tomorrow.    I have discussed the above with Dr. Browne.      Thank you for consulting the cardiovascular services at the [facility]. Please do not hesitate to call us with any questions.      Pardeep Cruz MD PGY4  Cardiology Fellow  [phone]      Pt's condition and care plan discussed with fellow but patient not seen personally by me today.    Shakila Browne MD  Section Head - Advanced Heart Failure, Transplantation and Mechanical Circulatory Support  Co-Director - [facility]  Associate Professor of Medicine, [facility]

## 2017-12-25 NOTE — PROVIDER NOTIFICATION
Notified team of elevated BPs 147/100 (114) at 0400. Received a call back from Cards 2 resident with instruction to give scheduled morning Hydralazine now.

## 2017-12-26 ENCOUNTER — APPOINTMENT (OUTPATIENT)
Dept: GENERAL RADIOLOGY | Facility: CLINIC | Age: 62
DRG: 219 | End: 2017-12-26
Attending: PHYSICIAN ASSISTANT
Payer: COMMERCIAL

## 2017-12-26 ENCOUNTER — APPOINTMENT (OUTPATIENT)
Dept: OCCUPATIONAL THERAPY | Facility: CLINIC | Age: 62
DRG: 219 | End: 2017-12-26
Attending: INTERNAL MEDICINE
Payer: COMMERCIAL

## 2017-12-26 ENCOUNTER — APPOINTMENT (OUTPATIENT)
Dept: SPEECH THERAPY | Facility: CLINIC | Age: 62
DRG: 219 | End: 2017-12-26
Attending: INTERNAL MEDICINE
Payer: COMMERCIAL

## 2017-12-26 LAB
ANION GAP SERPL CALCULATED.3IONS-SCNC: 8 MMOL/L (ref 3–14)
BACTERIA SPEC CULT: NORMAL
BUN SERPL-MCNC: 35 MG/DL (ref 7–30)
C DIFF TOX B STL QL: NEGATIVE
CALCIUM SERPL-MCNC: 8.9 MG/DL (ref 8.5–10.1)
CHLORIDE SERPL-SCNC: 107 MMOL/L (ref 94–109)
CO2 SERPL-SCNC: 26 MMOL/L (ref 20–32)
CREAT SERPL-MCNC: 0.77 MG/DL (ref 0.66–1.25)
ERYTHROCYTE [DISTWIDTH] IN BLOOD BY AUTOMATED COUNT: 18 % (ref 10–15)
GFR SERPL CREATININE-BSD FRML MDRD: >90 ML/MIN/1.7M2
GLUCOSE BLDC GLUCOMTR-MCNC: 115 MG/DL (ref 70–99)
GLUCOSE BLDC GLUCOMTR-MCNC: 119 MG/DL (ref 70–99)
GLUCOSE BLDC GLUCOMTR-MCNC: 143 MG/DL (ref 70–99)
GLUCOSE BLDC GLUCOMTR-MCNC: 147 MG/DL (ref 70–99)
GLUCOSE SERPL-MCNC: 128 MG/DL (ref 70–99)
HCT VFR BLD AUTO: 28.1 % (ref 40–53)
HGB BLD-MCNC: 8.5 G/DL (ref 13.3–17.7)
INTERPRETATION ECG - MUSE: NORMAL
INTERPRETATION ECG - MUSE: NORMAL
LACTATE BLD-SCNC: 1.2 MMOL/L (ref 0.7–2)
Lab: NORMAL
MAGNESIUM SERPL-MCNC: 2.5 MG/DL (ref 1.6–2.3)
MCH RBC QN AUTO: 30.4 PG (ref 26.5–33)
MCHC RBC AUTO-ENTMCNC: 30.2 G/DL (ref 31.5–36.5)
MCV RBC AUTO: 100 FL (ref 78–100)
PHOSPHATE SERPL-MCNC: 2.6 MG/DL (ref 2.5–4.5)
PLATELET # BLD AUTO: 370 10E9/L (ref 150–450)
POTASSIUM SERPL-SCNC: 4 MMOL/L (ref 3.4–5.3)
RADIOLOGIST FLAGS: ABNORMAL
RBC # BLD AUTO: 2.8 10E12/L (ref 4.4–5.9)
SODIUM SERPL-SCNC: 141 MMOL/L (ref 133–144)
SPECIMEN SOURCE: NORMAL
WBC # BLD AUTO: 15.8 10E9/L (ref 4–11)

## 2017-12-26 PROCEDURE — 97535 SELF CARE MNGMENT TRAINING: CPT | Mod: GO | Performed by: OCCUPATIONAL THERAPIST

## 2017-12-26 PROCEDURE — 27210437 ZZH NUTRITION PRODUCT SEMIELEM INTERMED LITER

## 2017-12-26 PROCEDURE — 40000225 ZZH STATISTIC SLP WARD VISIT: Performed by: SPEECH-LANGUAGE PATHOLOGIST

## 2017-12-26 PROCEDURE — 40000802 ZZH SITE CHECK

## 2017-12-26 PROCEDURE — 25000125 ZZHC RX 250: Performed by: STUDENT IN AN ORGANIZED HEALTH CARE EDUCATION/TRAINING PROGRAM

## 2017-12-26 PROCEDURE — 25000132 ZZH RX MED GY IP 250 OP 250 PS 637: Performed by: SURGERY

## 2017-12-26 PROCEDURE — 40000275 ZZH STATISTIC RCP TIME EA 10 MIN

## 2017-12-26 PROCEDURE — 94640 AIRWAY INHALATION TREATMENT: CPT | Mod: 76

## 2017-12-26 PROCEDURE — 25000128 H RX IP 250 OP 636

## 2017-12-26 PROCEDURE — 99232 SBSQ HOSP IP/OBS MODERATE 35: CPT | Performed by: PSYCHIATRY & NEUROLOGY

## 2017-12-26 PROCEDURE — 71010 XR CHEST PORT 1 VW: CPT | Mod: 76

## 2017-12-26 PROCEDURE — 92526 ORAL FUNCTION THERAPY: CPT | Mod: GN | Performed by: SPEECH-LANGUAGE PATHOLOGIST

## 2017-12-26 PROCEDURE — 21400006 ZZH R&B CCU INTERMEDIATE UMMC

## 2017-12-26 PROCEDURE — 36592 COLLECT BLOOD FROM PICC: CPT | Performed by: STUDENT IN AN ORGANIZED HEALTH CARE EDUCATION/TRAINING PROGRAM

## 2017-12-26 PROCEDURE — 83605 ASSAY OF LACTIC ACID: CPT | Performed by: STUDENT IN AN ORGANIZED HEALTH CARE EDUCATION/TRAINING PROGRAM

## 2017-12-26 PROCEDURE — 25000131 ZZH RX MED GY IP 250 OP 636 PS 637: Performed by: THORACIC SURGERY (CARDIOTHORACIC VASCULAR SURGERY)

## 2017-12-26 PROCEDURE — 25000132 ZZH RX MED GY IP 250 OP 250 PS 637: Performed by: PHYSICIAN ASSISTANT

## 2017-12-26 PROCEDURE — 25000132 ZZH RX MED GY IP 250 OP 250 PS 637: Performed by: HOSPITALIST

## 2017-12-26 PROCEDURE — 84100 ASSAY OF PHOSPHORUS: CPT | Performed by: PHYSICIAN ASSISTANT

## 2017-12-26 PROCEDURE — 00000146 ZZHCL STATISTIC GLUCOSE BY METER IP

## 2017-12-26 PROCEDURE — 87493 C DIFF AMPLIFIED PROBE: CPT | Performed by: PHYSICIAN ASSISTANT

## 2017-12-26 PROCEDURE — 85027 COMPLETE CBC AUTOMATED: CPT | Performed by: PHYSICIAN ASSISTANT

## 2017-12-26 PROCEDURE — 25000132 ZZH RX MED GY IP 250 OP 250 PS 637: Performed by: THORACIC SURGERY (CARDIOTHORACIC VASCULAR SURGERY)

## 2017-12-26 PROCEDURE — 99233 SBSQ HOSP IP/OBS HIGH 50: CPT | Mod: GC | Performed by: INTERNAL MEDICINE

## 2017-12-26 PROCEDURE — 94640 AIRWAY INHALATION TREATMENT: CPT

## 2017-12-26 PROCEDURE — 36592 COLLECT BLOOD FROM PICC: CPT | Performed by: PHYSICIAN ASSISTANT

## 2017-12-26 PROCEDURE — 25000132 ZZH RX MED GY IP 250 OP 250 PS 637: Performed by: ANESTHESIOLOGY

## 2017-12-26 PROCEDURE — 94668 MNPJ CHEST WALL SBSQ: CPT

## 2017-12-26 PROCEDURE — 83735 ASSAY OF MAGNESIUM: CPT | Performed by: PHYSICIAN ASSISTANT

## 2017-12-26 PROCEDURE — 25000125 ZZHC RX 250: Performed by: SURGERY

## 2017-12-26 PROCEDURE — 80048 BASIC METABOLIC PNL TOTAL CA: CPT | Performed by: PHYSICIAN ASSISTANT

## 2017-12-26 PROCEDURE — 25000125 ZZHC RX 250: Performed by: PHYSICIAN ASSISTANT

## 2017-12-26 PROCEDURE — 40000133 ZZH STATISTIC OT WARD VISIT: Performed by: OCCUPATIONAL THERAPIST

## 2017-12-26 PROCEDURE — 93010 ELECTROCARDIOGRAM REPORT: CPT | Performed by: INTERNAL MEDICINE

## 2017-12-26 PROCEDURE — 71010 XR CHEST PORT 1 VW: CPT

## 2017-12-26 PROCEDURE — 25000132 ZZH RX MED GY IP 250 OP 250 PS 637: Performed by: INTERNAL MEDICINE

## 2017-12-26 PROCEDURE — 25000128 H RX IP 250 OP 636: Performed by: PHYSICIAN ASSISTANT

## 2017-12-26 PROCEDURE — 93005 ELECTROCARDIOGRAM TRACING: CPT

## 2017-12-26 RX ORDER — NAFCILLIN SODIUM 2 G/8ML
2 INJECTION, POWDER, FOR SOLUTION INTRAMUSCULAR; INTRAVENOUS EVERY 4 HOURS
Status: DISCONTINUED | OUTPATIENT
Start: 2017-12-26 | End: 2018-01-04 | Stop reason: HOSPADM

## 2017-12-26 RX ORDER — LOSARTAN POTASSIUM 25 MG/1
25 TABLET ORAL EVERY 12 HOURS SCHEDULED
Status: DISCONTINUED | OUTPATIENT
Start: 2017-12-26 | End: 2018-01-04 | Stop reason: HOSPADM

## 2017-12-26 RX ORDER — CEFEPIME 1 G/50ML
2 INJECTION, SOLUTION INTRAVENOUS EVERY 8 HOURS
Status: COMPLETED | OUTPATIENT
Start: 2017-12-26 | End: 2018-01-03

## 2017-12-26 RX ORDER — LOSARTAN POTASSIUM 25 MG/1
25 TABLET ORAL EVERY 12 HOURS SCHEDULED
Status: DISCONTINUED | OUTPATIENT
Start: 2017-12-26 | End: 2017-12-26

## 2017-12-26 RX ORDER — BUMETANIDE 2 MG/1
2 TABLET ORAL
Status: DISCONTINUED | OUTPATIENT
Start: 2017-12-26 | End: 2017-12-29

## 2017-12-26 RX ADMIN — MULTIVITAMIN 15 ML: LIQUID ORAL at 09:56

## 2017-12-26 RX ADMIN — NAFCILLIN SODIUM 2 G: 2 INJECTION, POWDER, LYOPHILIZED, FOR SOLUTION INTRAMUSCULAR; INTRAVENOUS at 09:48

## 2017-12-26 RX ADMIN — POTASSIUM CHLORIDE 20 MEQ: 1.5 POWDER, FOR SOLUTION ORAL at 10:17

## 2017-12-26 RX ADMIN — HEPARIN SODIUM 5000 UNITS: 5000 INJECTION, SOLUTION INTRAVENOUS; SUBCUTANEOUS at 17:32

## 2017-12-26 RX ADMIN — FUROSEMIDE 40 MG: 40 TABLET ORAL at 09:55

## 2017-12-26 RX ADMIN — HYDRALAZINE HYDROCHLORIDE 25 MG: 25 TABLET ORAL at 09:53

## 2017-12-26 RX ADMIN — CEFEPIME 2 G: 1 INJECTION, SOLUTION INTRAVENOUS at 21:25

## 2017-12-26 RX ADMIN — ACETAMINOPHEN 650 MG: 160 SUSPENSION ORAL at 09:52

## 2017-12-26 RX ADMIN — ALBUTEROL SULFATE 2.5 MG: 2.5 SOLUTION RESPIRATORY (INHALATION) at 00:14

## 2017-12-26 RX ADMIN — ACETYLCYSTEINE 2 ML: 100 SOLUTION ORAL; RESPIRATORY (INHALATION) at 04:15

## 2017-12-26 RX ADMIN — ASPIRIN 81 MG CHEWABLE TABLET 81 MG: 81 TABLET CHEWABLE at 09:56

## 2017-12-26 RX ADMIN — ALBUTEROL SULFATE 2.5 MG: 2.5 SOLUTION RESPIRATORY (INHALATION) at 13:10

## 2017-12-26 RX ADMIN — NAFCILLIN SODIUM 2 G: 2 INJECTION, POWDER, LYOPHILIZED, FOR SOLUTION INTRAMUSCULAR; INTRAVENOUS at 17:33

## 2017-12-26 RX ADMIN — ACETYLCYSTEINE 2 ML: 100 SOLUTION ORAL; RESPIRATORY (INHALATION) at 13:10

## 2017-12-26 RX ADMIN — CEFEPIME 2 G: 1 INJECTION, SOLUTION INTRAVENOUS at 13:43

## 2017-12-26 RX ADMIN — LOSARTAN POTASSIUM 25 MG: 25 TABLET ORAL at 20:10

## 2017-12-26 RX ADMIN — BUMETANIDE 2 MG: 2 TABLET ORAL at 17:33

## 2017-12-26 RX ADMIN — ALBUTEROL SULFATE 2.5 MG: 2.5 SOLUTION RESPIRATORY (INHALATION) at 09:08

## 2017-12-26 RX ADMIN — HYDRALAZINE HYDROCHLORIDE 25 MG: 25 TABLET ORAL at 13:20

## 2017-12-26 RX ADMIN — ACETYLCYSTEINE 2 ML: 100 SOLUTION ORAL; RESPIRATORY (INHALATION) at 09:08

## 2017-12-26 RX ADMIN — ALBUTEROL SULFATE 2.5 MG: 2.5 SOLUTION RESPIRATORY (INHALATION) at 04:14

## 2017-12-26 RX ADMIN — ATORVASTATIN CALCIUM 40 MG: 40 TABLET, FILM COATED ORAL at 09:55

## 2017-12-26 RX ADMIN — CEFEPIME 2 G: 1 INJECTION, SOLUTION INTRAVENOUS at 05:55

## 2017-12-26 RX ADMIN — FAMOTIDINE 20 MG: 40 POWDER, FOR SUSPENSION ORAL at 09:55

## 2017-12-26 RX ADMIN — ACETYLCYSTEINE 2 ML: 100 SOLUTION ORAL; RESPIRATORY (INHALATION) at 00:14

## 2017-12-26 RX ADMIN — HEPARIN SODIUM 5000 UNITS: 5000 INJECTION, SOLUTION INTRAVENOUS; SUBCUTANEOUS at 09:52

## 2017-12-26 RX ADMIN — INSULIN ASPART 1 UNITS: 100 INJECTION, SOLUTION INTRAVENOUS; SUBCUTANEOUS at 13:07

## 2017-12-26 RX ADMIN — KETOTIFEN FUMARATE 1 DROP: 0.25 SOLUTION/ DROPS OPHTHALMIC at 13:19

## 2017-12-26 RX ADMIN — ISOSORBIDE DINITRATE 20 MG: 5 TABLET ORAL at 13:18

## 2017-12-26 RX ADMIN — Medication 300 MG: at 20:15

## 2017-12-26 RX ADMIN — ALBUTEROL SULFATE 2.5 MG: 2.5 SOLUTION RESPIRATORY (INHALATION) at 20:26

## 2017-12-26 RX ADMIN — HEPARIN SODIUM 5000 UNITS: 5000 INJECTION, SOLUTION INTRAVENOUS; SUBCUTANEOUS at 01:44

## 2017-12-26 RX ADMIN — KETOTIFEN FUMARATE 1 DROP: 0.25 SOLUTION/ DROPS OPHTHALMIC at 10:13

## 2017-12-26 RX ADMIN — MIRTAZAPINE 30 MG: 15 TABLET, FILM COATED ORAL at 20:11

## 2017-12-26 RX ADMIN — OLANZAPINE 5 MG: 5 TABLET, FILM COATED ORAL at 20:11

## 2017-12-26 RX ADMIN — MINERAL OIL AND WHITE PETROLATUM: 150; 830 OINTMENT OPHTHALMIC at 21:27

## 2017-12-26 RX ADMIN — ACETYLCYSTEINE 2 ML: 100 SOLUTION ORAL; RESPIRATORY (INHALATION) at 20:26

## 2017-12-26 RX ADMIN — NAFCILLIN SODIUM 2 G: 2 INJECTION, POWDER, LYOPHILIZED, FOR SOLUTION INTRAMUSCULAR; INTRAVENOUS at 20:15

## 2017-12-26 RX ADMIN — OLANZAPINE 5 MG: 5 TABLET, FILM COATED ORAL at 09:54

## 2017-12-26 RX ADMIN — NAFCILLIN SODIUM 2 G: 2 INJECTION, POWDER, LYOPHILIZED, FOR SOLUTION INTRAMUSCULAR; INTRAVENOUS at 13:04

## 2017-12-26 RX ADMIN — ALBUTEROL SULFATE 2.5 MG: 2.5 SOLUTION RESPIRATORY (INHALATION) at 23:58

## 2017-12-26 RX ADMIN — Medication 300 MG: at 09:52

## 2017-12-26 RX ADMIN — NAFCILLIN SODIUM 2 G: 2 INJECTION, POWDER, LYOPHILIZED, FOR SOLUTION INTRAMUSCULAR; INTRAVENOUS at 23:38

## 2017-12-26 RX ADMIN — ACETAMINOPHEN 650 MG: 160 SUSPENSION ORAL at 14:57

## 2017-12-26 RX ADMIN — KETOTIFEN FUMARATE 1 DROP: 0.25 SOLUTION/ DROPS OPHTHALMIC at 20:16

## 2017-12-26 RX ADMIN — ISOSORBIDE DINITRATE 20 MG: 5 TABLET ORAL at 09:53

## 2017-12-26 ASSESSMENT — ACTIVITIES OF DAILY LIVING (ADL)
ADLS_ACUITY_SCORE: 34
ADLS_ACUITY_SCORE: 34
ADLS_ACUITY_SCORE: 32
ADLS_ACUITY_SCORE: 34
ADLS_ACUITY_SCORE: 34

## 2017-12-26 NOTE — PLAN OF CARE
Problem: Patient Care Overview  Goal: Plan of Care/Patient Progress Review  Discharge Planner SLP   Patient plan for discharge: Not discussed this session.  Current status: Moderate oral residuals, low self-monitoring, poor oral bolus control all support recommendation to continue full liquid diet with swallow precautions (sit upright, limit sip and bite size, hold PO at coughing) this date. Extensive education provided to family member regarding modified diet, aspiration risk, and effects of aspiration; family member verbalized understanding, though she would prefer him on a regular diet.   Barriers to return to prior living situation: AMS, medical stability, modified diet  Recommendations for discharge: TCU  Rationale for recommendations: Pt will need ongoing SLP services at next level of care due to oral phase of swallow not at baseline, and pt not at baseline diet.        Entered by: Melanie Manuel 12/26/2017 1:13 PM

## 2017-12-26 NOTE — PROGRESS NOTES
CVTS Progress Note  Attending provider: Pili Bowers,*      SUBJECTIVE:    No acute issues over night.   Yesterday spouse reporting patient more flat and not interacting with environment as much. No changes in neuro exam.   No acute complaints, reports feels same as yesterday, pain well controlled.  Patient denies SOB, CP, fever, chills, sweats.     Patient has been tolerating diet. + BM. + flatus.  NSR to sinus tach.       OBJECTIVE:   Temp:  [97.6  F (36.4  C)-98.9  F (37.2  C)] 98.6  F (37  C)  Heart Rate:  [103-114] 103  Resp:  [18-22] 20  BP: (103-150)/(72-97) 103/83  SpO2:  [95 %-99 %] 97 %    Gen: NAD, more alert today  Neck: + JVD   CV: RRR, normal S1, S2, no murmurs, rubs or gallops   Pulm: Fine crackles in bases, otherwise CTA, No wheezing or rhonchi  Abd: Soft, NT, ND, +BS  Ext: Trace LE edema  Neuro: Nonfocal  Incision: c/d/i, no erythema, sternum stable  Tubes/drains: Dressing clean and dry, minimal serosanguinous output, switched to waterseal, no air leak.     I&O's:  I/O last 3 completed shifts:  In: 1485 [P.O.:360; I.V.:130; NG/GT:150]  Out: 290 [Urine:250; Chest Tube:40]    Labs:   BMP    Recent Labs  Lab 12/26/17  0623 12/25/17  0951 12/24/17  0427 12/23/17  0420    140 142 146*   POTASSIUM 4.0 4.0 3.9 3.8   CHLORIDE 107 106 107 110*   IZABELLA 8.9 8.9 9.0 8.4*   CO2 26 26 28 28   BUN 35* 29 29 27   CR 0.77 0.80 0.95 1.17   * 146* 142* 121*     CBC    Recent Labs  Lab 12/26/17  0623 12/25/17  0809 12/23/17  0420 12/22/17  1626 12/22/17  0401 12/21/17  0154   WBC 15.8* 14.1* 11.1*  --   --  13.4*   RBC 2.80* 2.83* 2.64*  --   --  2.72*   HGB 8.5* 8.6* 7.9* 7.9* 7.9* 8.2*   HCT 28.1* 28.1* 25.5*  --   --  25.0*    99 97  --   --  92   MCH 30.4 30.4 29.9  --   --  30.1   MCHC 30.2* 30.6* 31.0*  --   --  32.8   RDW 18.0* 18.2* 18.3*  --   --  19.0*    364 270  --  210 250     INR    Recent Labs  Lab 12/21/17  0154 12/20/17  0748 12/19/17  1806 12/19/17  1653   INR 1.35*  1.29* 0.90 0.81*      Hepatic Panel No lab results found in last 7 days.  Glucose  Recent Labs  Lab 12/26/17  1142 12/26/17  0851 12/26/17  0623 12/26/17  0141 12/25/17  1915 12/25/17  1537 12/25/17  1122  12/25/17  0951  12/24/17  0427  12/23/17  0420  12/22/17  0401  12/21/17  0154   GLC  --   --  128*  --   --   --   --   --  146*  --  142*  --  121*  --  112*  --  105*   * 143*  --  119* 134* 121* 157*  < >  --   < >  --   < >  --   < >  --   < >  --    < > = values in this interval not displayed.    Imaging:   CXR 12/26:  1. Mildly increased trace left apical pneumothorax.   2. Stable appearing bilateral patchy opacities, worse on right.  3. Stable small left sided pleural effusion.  4. Stable appearing support devices as listed above.      ASSESSMENT: Cricket Miguel is a 62 year old male with hx of COPD, HTN, HLD, CAD, heart failure with preserved EF, aortic stenosis and ascending aortic aneurysm s/p aortic valve replacement and aortic root replacement (April 2016) c/b moises-aortic abscess, endocarditis, cerebral septic emboli and severe AI s/p redo-sternotomy aortic root and valve homograft 12/19.       PLAN:   Neuro/ pain/ sedation: Hx of multifocal acute infarctions involving the left parietotemporal lobes, left cerebral hemisphere and right occipital lobe presumed to be septic emboli. Depression, mild delirium, followed by psych.  -Monitor neurological status. Notify the MD for any acute changes in exam.  -Dilaudid PRN.  -Mental status improving.  - On psych rec started olanzapine. Agitation improving. Serial EKGs to evaluate QT interval. QTc today 490. Will reconsult psych given flat affect     Pulmonary care: extubated 12/20  - On facemask 2 LPM saturation 100%.  - Encourage pulmonary toilet  - Nasotracheal suctioning  - Bilateral pleural chest tubes, waterseal for 24 hours, minimal output, CXR with slight increase in left apical pneumo. Removed right chest tube without complication, CXR this PM.    - Left pleural tube clamping trial this tomorrow AM. Clamp tube at 0300 on 12/27, CXR at 0800.       Cardiovascular:  HTN, HLD, CAD, AS and aortic aneurysm s/p bentall c/b endocarditis and severe AI s/p aortic root/valve homograft 12/19.  -Keep SBP<120  -Isordil/hydralazine started per cardiology team for elevated MAPs  - ASA 81mg and atorvastatin.  - Echo shows EF 20-25% baseline 30%   - No bradycardia or pauses, has been in NSR for past 3 days, TPW removed 12/26     GI care: PEG  -famotidine  -senna/colace/miralax, +BM, cdiff negative 12/26      Fluids/ Electrolytes/ Nutrition:   -PRN electrolyte replacement  -No indication for parenteral nutrition.     Renal/ Fluid Balance: Urine output is adequate so far.  -Garza catheter for strict intake and output.  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 Hx of MSSA septic arthritis and bacteremia. Hx of multifocal acute infarctions involving the left parietotemporal lobes, left cerebral hemisphere and right occipital lobe presumed to be septic emboli. ID following.   -New cultures BAL growing pseudomonas aeruginosa. ID following. Continue cefepime/rifampin. WBC 14->15, Restart nafcillin per ID recs. Will not switch cefepime to cipro given QT prolonging effects  -F/u surgical tissue cultures      Heme: acute blood loss anemia  - Hgb stable.      Prophylaxis:    -Mechanical prophylaxis for DVT.   -Heparin TID.       Lines/ tubes/ drains:  -R triple lumen PICC, garza, PIVs      Disposition:  -6B since 12/23     Staff surgeons have been informed of changes through both verbal and written communication.         Benny Unger PA-C  Cardiothoracic Surgery  December 26, 2017 2:30 PM   p: [phone]

## 2017-12-26 NOTE — PROGRESS NOTES
[facility]  CARDIOLOGY DAILY PROGRESS NOTE    Interval History:   More alert today compared to yesterday  No pains  Wants to get up to a chair  Chest tubes should come out    Objective  Temp:  [97.6  F (36.4  C)-98.7  F (37.1  C)] 98.2  F (36.8  C)  Pulse:  [113-118] 118  Heart Rate:  [106-114] 114  Resp:  [18] 18  BP: ()/(65-91) 150/86  SpO2:  [95 %-100 %] 96 %   Room air to 2L NC    I/O last 3 completed shifts:  In: 1485 [P.O.:360; I.V.:130; NG/GT:150]  Out: 290 [Urine:250; Chest Tube:40]     Wt Readings from Last 5 Encounters:   12/26/17 81.7 kg (180 lb 1.6 oz)   12/15/17 83 kg (183 lb)   11/02/17 88.3 kg (194 lb 10.7 oz)   02/06/17 91.6 kg (202 lb)   01/09/17 87.5 kg (193 lb)     GEN: no acute distress  HEENT: no icterus  CV: RRR, normal s1/s2, no murmurs/rubs/s3/s4, no heave. JVP at 8-9 cm.   CHEST: on 2L NC, normal work of breathing, clear to ausculation bilaterally, no rales or wheezing  ABD: soft, non-tender, normal active bowel sounds  EXTR: pulses 2+ and equal. No clubbing, cyanosis.  Trace lower extremity edema.   NEURO: slight dysarthria, left facial droop, disoriented occasionally    Current Medications    furosemide  40 mg Oral BID     isosorbide dinitrate  20 mg Oral TID     hydrALAZINE  25 mg Oral TID     OLANZapine  5 mg Oral BID     albuterol  2.5 mg Nebulization Q4H     famotidine  20 mg Oral or Feeding Tube Daily     ceFEPIme (MAXIPIME) IV  2 g Intravenous Q8H     polyethylene glycol  17 g Oral Daily     heparin  5,000 Units Subcutaneous Q8H     multivitamins with minerals  15 mL Per Feeding Tube Daily     sodium chloride (PF)  3 mL Intracatheter Q8H     insulin aspart   Subcutaneous TID w/meals     senna-docusate  1-2 tablet Oral BID     acetylcysteine  2 mL Nebulization Q4H     mirtazapine  30 mg Oral At Bedtime     rifampin  300 mg Oral or Feeding Tube BID     ketotifen  1 drop Left Eye TID     artificial tears   Ophthalmic At Bedtime     aspirin  81 mg Oral  or Feeding Tube Daily     atorvastatin  40 mg Oral Daily       IV fluid REPLACEMENT ONLY       IV fluid REPLACEMENT ONLY       Reason beta blocker order not selected       - MEDICATION INSTRUCTIONS -       Lab Values  BMP    Recent Labs  Lab 12/25/17  0951 12/24/17  0427 12/23/17  0420 12/22/17  1626 12/22/17  0401    142 146*  --  145*   POTASSIUM 4.0 3.9 3.8 3.7 3.8   CHLORIDE 106 107 110*  --  110*   IZABELLA 8.9 9.0 8.4*  --  8.4*   CO2 26 28 28  --  29   BUN 29 29 27  --  26   CR 0.80 0.95 1.17  --  1.63*   * 142* 121*  --  112*       CBC  Recent Labs  Lab 12/26/17  0623 12/25/17  0809 12/23/17  0420  12/22/17  0401 12/21/17  0154   WBC 15.8* 14.1* 11.1*  --   --  13.4*   RBC 2.80* 2.83* 2.64*  --   --  2.72*   HGB 8.5* 8.6* 7.9*  < > 7.9* 8.2*   HCT 28.1* 28.1* 25.5*  --   --  25.0*    99 97  --   --  92   MCH 30.4 30.4 29.9  --   --  30.1   MCHC 30.2* 30.6* 31.0*  --   --  32.8   RDW 18.0* 18.2* 18.3*  --   --  19.0*    364 270  --  210 250   < > = values in this interval not displayed.  INR    Recent Labs  Lab 12/21/17  0154 12/20/17  0748 12/19/17  1806 12/19/17  1653   INR 1.35* 1.29* 0.90 0.81*     Studies  EKG: low voltage, sinus rhythm     Transthoracic echocardiogram:   Left ventricular wall thickness is normal. Mild left ventricular dilation is present. The Ejection Fraction is estimated at 30-35%. Diastolic function not assessed due to tachycardia. Severe diffuse hypokinesis is present.     The right ventricle is normal size. Global right ventricular function is mildly reduced.  Both atria appear normal.  Trace mitral insufficiency is present.  The tricuspid valve is normal.     Vessels  The patient is post bio-Bentall - aortic root replacement with a 30 mm graft and AVR with a Trifecta valve. Today, there is circumferential free space noted surrounding the aortic graft, dehiscence and rocking of the graft from the native aorta and severe AI in the space surrounding the aortic  graft (perivalvular/perigraft). The circumferential free space was present on the  previous studies of October 2017 but was filled with echodense material and the graft was not independently mobile, and there was no significant AI. The prosthetic aortic valve leaflets are not well visualized today.     No pericardial effusion is present.     Compared to Previous Study  This study was compared with the study from 10/06/2017 . Since the prior study, the LV function has worsened and the aortic graft appears to be dehisced, as described above.    CXR  Impression:   1. Small left pleural effusion and associated retrocardiac subsegmental atelectasis and/or consolidation.  2. Stable postsurgical changes and supporting devices.   3. Stable pulmonary vascular congestion.        Assessment and Recommendation:  Cricket Miguel is a 62 year old male admitted with worsening dyspnea, thought to have acute severe AI on echocardiogram.  Pertinent history of aortic stenosis and ascending aortic aneurysm s/p AVR and root replacement April 2016.  Had complications of moises aortic abscess, endocarditis, and septic emboli to his brain.  He underwent redo sternotomy with aortic root and valve homograft 12/19.  Heart failure service is being consulted for newly reduced EF 20-25%.  Most likely related to  severe AI and recent surgery, and should improve over time.  Does not appear ischemic.     Switching to losartan 25mg BID (i ordered) from isordil/hydral combo since his renal function has improved back to normal.  He has lisinopril allergy documented of left sided facial swelling, doubt that is actual angioedema but will watch.  His blood pressure overall has been increasing steadily.      Because nafcillin is being started, I switched his lasix to bumex at higher dose (2mg PO BID).  Nafcillin typically has a higher sodium load.      If his pressures tolerate, likely can start a beta blocker in a few days    I have discussed the above  with Dr. Browne.      Thank you for consulting the cardiovascular services at the [facility]. Please do not hesitate to call us with any questions.      Pardeep Cruz MD PGY4  Cardiology Fellow  [phone]        I have reviewed today's vital signs, notes, medications, labs and imaging. I have also seen and examined the patient and agree with the findings and plan as outlined above.    Shakila Browne MD  Section Head - Advanced Heart Failure, Transplantation and Mechanical Circulatory Support  Co-Director - Adult [facility]  Associate Professor of Medicine, [facility]

## 2017-12-26 NOTE — CONSULTS
"DATE OF SERVICE:  12/26/2017      IDENTIFICATION:  Mr. Cricket Miguel is a 62-year-old white male who is status post CVA.  He has had at least 3 weeks of delirium and I am asked to adjust his antipsychotic medications by Sherif Unger PA-C.       Prior to interviewing this patient, I had an opportunity to review my initial consultation completed on 12/21/2017.  The patient has been started on Zyprexa 5 b.i.d. as well as 5 p.r.n.  The concern now is that the patient is \"too flat.\" Prior to seeing him, I ran into his family in the hallway and they felt that he was oversedated.  During my interview, the patient's daughter was feeding him jello and he seemed relatively content, but he reported that something was wrong with his thinking, which was clearly the case.  He was completely disoriented, did not know where we were or what the month was.  Family felt that he had been oversedated, so I suggested we could decrease his morning olanzapine to 2.5.  I am somewhat concerned about his potential for becoming agitated and aggressive.  However, family was very interested in decreasing his antipsychotic load.  When discussing this with his daughter, she reports that he is not actually violent.  He just \"occasionally hits.\"  I am hopeful that by decreasing his Zyprexa, we will not increase his occasions to hit but will help him to have a little more energy to participate in cares.        MENTAL STATUS EXAMINATION:  On my interview, the patient was quite pleasant and cooperative.  His mood seemed pretty good as he was with his daughter and she was feeding  him cubes of jello.  He seemed quite content with that situation.  His affect did not seem flat to me, but again this was probably because of the presence of his daughter.  On my interview, his affect was actually full.  His speech was coherent, not always goal oriented.  His associations were not always tight, nor were his thought processes logical and linear.  His content " of thought was without overt psychosis or suicidal ideation.  Recent and remote memory, concentration and fund of knowledge were impaired.  Use of language was generally within normal limits.  He was alert but only oriented to self.  Insight and judgment are very guarded.  Muscle strength and tone are decreased post-CVA.  Recent vital signs include a temperature of 98.6, pulse of 118, respiration rate of 20 with 97% oxygen saturation and a blood pressure of 103/83.      ASSESSMENT:  Delirium likely secondary to infection and cerebrovascular accident.      RECOMMENDATION:  Please decrease morning Zyprexa to 2.5 mg.  There is already a 5 mg p.r.n. available should it become necessary.         SUZY HARRIS MD             D: 2017 16:15   T: 2017 16:46   MT: SHERLYN      Name:     ASHTYN STROUD   MRN:      [id number]        Account:       [id number]   :      1955           Consult Date:  2017      Document: [id number]

## 2017-12-26 NOTE — PROGRESS NOTES
"Social Work Services Progress Note    Hospital Day: 11  Date of Initial Social Work Evaluation:  12/26/17  Collaborated with:  Patient, Patients SO Meghna ([phone])     Data:  Patient is a 62 year old     Intervention:  SW met with patient and pt's SO, Meghna at beside. Patient appeared drowsy and did not participate in conversation. Pt's SO stated that her goal is for patient to discharge home and reported that she would be the caregiver for pt 24/7. Meghna was previously caregiving for patient prior to this admission and reported that she wants to continue that role. SW did provide education on the rec for TCU from OT. Pt's SO reported that she believed patient will continue to make progress and be able to DC home. SW supported Meghna and discussed home care. [facility] and has an RN 1x/wk, IV therapist 1x/wk, SP 1x/wk, and OT/PT 5 days a week for one hour each day. Patient's SO was also paying for HHA to help with bathing. Pt's SO requested to speaking with [employer] regarding coverage for home care. Meghna stated that she wasn't oppposed to TCU if that is what patient ends up needing, Meghna continued to state that patient did not want to go to a TCU. Meghna mentioned that she wants to tour [facility].     SW called patients daughter, Eloy, as patients three children are NOK. KARLENE provided update to Eloy regarding request about home care from pt's SO Meghna. Eloy stated that \"Its not safe for her\" to continue to care for him. Patients daughter reported that she and her siblings, agree with therapy rec that patient needs TCU and want patient to transfer to a TCU in order to make progress. Pt's dtr inquired about if pt can get 3 hours of therapy per day at rehab. SW provided education regarding TCU vs. ARU and the current rec for TCU. Patients daughter noted that they want patient as close to home as possible but recognize that tube feedings can be a barrier in finding a facility. " Patients daughter requested a list of TCU's closest to home that do take patients with feeding tubes. SW will f/u with patients daughter. Pt's daughter noted that [facility] is their first choice if they accept feeding tubes. Patients daughter did clarify that pt's children are in agreement with this plan. Eloy noted that patient was previously getting therapy 5x/wk because it was the end of the year and they were attempting to use up sessions.     Referral [facility]: Referral sent via epic.       Assessment:  Patient was alert but did appear drowsy and did not participate in conversation regarding DC planning. Patients SO Meghna appeared worried about figuring out if patient can DC home vs. TCU. Patients daughter Eloy is one of the NOK and stated that at this time she wants patient to DC to a TCU because she feels this is best for patient.     Plan:    Anticipated Disposition:  Facility:  TCU     Barriers to d/c plan:  Medical stability, tube feedings    Follow Up:  KARLENE following for DC planning    ESTEFANY Martinez  , Coverage 6B-   Phone: [phone]  Pager: [phone]      Wed-Friday Please contact:  Primary 6B [place]    Phone: [phone]  Pager: [phone]

## 2017-12-26 NOTE — PROGRESS NOTES
CLINICAL NUTRITION SERVICES - BRIEF NOTE  *See RD note on 12/20 for last nutrition reassessment details    RN request RD meet with patient's wife as she has questions about TF.  Pt's wife wondering if it would be possible to transition to bolus TF as she is worried continuous TF is making him too full and hindering appetite.  Discussed with pt's wife that will have to wait for provider approval to transition to bolus TF and if happens will not happens it will not happen until tomorrow.  Pt's wife very agreeable with this and appreciative.    INTERVENTIONS  Implementation  Collaboration with other providers: paged team to ask if appropriate for transition to bolus TF tomorrow, awaiting response  Nutrition education: see above.  Let pt's wife know that writer will page providers and leave note for unit dietitian covering tomorrow.    Monitoring/Evaluation  Progress toward goals will be monitored and evaluated per protocol.     Jyoti Jean-Baptiste, RD, LD   6B RD pager: [phone]

## 2017-12-26 NOTE — PLAN OF CARE
Problem: Patient Care Overview  Goal: Plan of Care/Patient Progress Review  Outcome: No Change  VSS. O2 weaned to RA. Team pulled his Right CT today, Left CT still in to water seal and team wants it clamped at 3am tonight and have CXR done tomorrow morning at 8am. Pt seen by Speech, Dietician, and Psych today. Therapy to work with pt this afternoon. He needs to move to a room with a ceiling lift. Significant other has been by his bedside since 8am. Pt voiding adequate amounts per condom cath. He had 1 bm. C diff sent and was negative. Tylenol given x2 for generalized body pain. K+ replaced per protocol.

## 2017-12-26 NOTE — PLAN OF CARE
Problem: Patient Care Overview  Goal: Plan of Care/Patient Progress Review  Discharge Planner OT   Patient plan for discharge: Pt did not state  Current status: Pt required Max A for bed mobility, Min A for sitting balance. Mod A for simple g/h tasks.   Barriers to return to prior living situation: dependence for ADLs, cognition  Recommendations for discharge: TCU  Rationale for recommendations: to increase ADL I and tolerance       Entered by: Pradeep Holloway 12/26/2017 4:29 PM   4A

## 2017-12-27 ENCOUNTER — APPOINTMENT (OUTPATIENT)
Dept: GENERAL RADIOLOGY | Facility: CLINIC | Age: 62
DRG: 219 | End: 2017-12-27
Attending: PHYSICIAN ASSISTANT
Payer: COMMERCIAL

## 2017-12-27 ENCOUNTER — APPOINTMENT (OUTPATIENT)
Dept: OCCUPATIONAL THERAPY | Facility: CLINIC | Age: 62
DRG: 219 | End: 2017-12-27
Attending: INTERNAL MEDICINE
Payer: COMMERCIAL

## 2017-12-27 ENCOUNTER — APPOINTMENT (OUTPATIENT)
Dept: SPEECH THERAPY | Facility: CLINIC | Age: 62
DRG: 219 | End: 2017-12-27
Attending: INTERNAL MEDICINE
Payer: COMMERCIAL

## 2017-12-27 LAB
ANION GAP SERPL CALCULATED.3IONS-SCNC: 8 MMOL/L (ref 3–14)
BUN SERPL-MCNC: 41 MG/DL (ref 7–30)
CALCIUM SERPL-MCNC: 8.6 MG/DL (ref 8.5–10.1)
CHLORIDE SERPL-SCNC: 106 MMOL/L (ref 94–109)
CO2 SERPL-SCNC: 28 MMOL/L (ref 20–32)
COPATH REPORT: NORMAL
CREAT SERPL-MCNC: 0.74 MG/DL (ref 0.66–1.25)
ERYTHROCYTE [DISTWIDTH] IN BLOOD BY AUTOMATED COUNT: 18.3 % (ref 10–15)
GFR SERPL CREATININE-BSD FRML MDRD: >90 ML/MIN/1.7M2
GLUCOSE BLDC GLUCOMTR-MCNC: 153 MG/DL (ref 70–99)
GLUCOSE SERPL-MCNC: 159 MG/DL (ref 70–99)
HCT VFR BLD AUTO: 27.7 % (ref 40–53)
HGB BLD-MCNC: 8.4 G/DL (ref 13.3–17.7)
INTERPRETATION ECG - MUSE: NORMAL
INTERPRETATION ECG - MUSE: NORMAL
LACTATE BLD-SCNC: 1.3 MMOL/L (ref 0.7–2)
MAGNESIUM SERPL-MCNC: 2.2 MG/DL (ref 1.6–2.3)
MCH RBC QN AUTO: 30.3 PG (ref 26.5–33)
MCHC RBC AUTO-ENTMCNC: 30.3 G/DL (ref 31.5–36.5)
MCV RBC AUTO: 100 FL (ref 78–100)
PHOSPHATE SERPL-MCNC: 3 MG/DL (ref 2.5–4.5)
PLATELET # BLD AUTO: 410 10E9/L (ref 150–450)
POTASSIUM SERPL-SCNC: 3.8 MMOL/L (ref 3.4–5.3)
RBC # BLD AUTO: 2.77 10E12/L (ref 4.4–5.9)
SODIUM SERPL-SCNC: 141 MMOL/L (ref 133–144)
WBC # BLD AUTO: 14 10E9/L (ref 4–11)

## 2017-12-27 PROCEDURE — 40000225 ZZH STATISTIC SLP WARD VISIT

## 2017-12-27 PROCEDURE — 84100 ASSAY OF PHOSPHORUS: CPT | Performed by: STUDENT IN AN ORGANIZED HEALTH CARE EDUCATION/TRAINING PROGRAM

## 2017-12-27 PROCEDURE — 25000132 ZZH RX MED GY IP 250 OP 250 PS 637: Performed by: SURGERY

## 2017-12-27 PROCEDURE — 94640 AIRWAY INHALATION TREATMENT: CPT | Mod: 76

## 2017-12-27 PROCEDURE — 25000132 ZZH RX MED GY IP 250 OP 250 PS 637: Performed by: THORACIC SURGERY (CARDIOTHORACIC VASCULAR SURGERY)

## 2017-12-27 PROCEDURE — 27210913 ZZH NUTRITION PRODUCT INTERMEDIATE PACKET

## 2017-12-27 PROCEDURE — 36592 COLLECT BLOOD FROM PICC: CPT | Performed by: THORACIC SURGERY (CARDIOTHORACIC VASCULAR SURGERY)

## 2017-12-27 PROCEDURE — 85027 COMPLETE CBC AUTOMATED: CPT | Performed by: THORACIC SURGERY (CARDIOTHORACIC VASCULAR SURGERY)

## 2017-12-27 PROCEDURE — 25000125 ZZHC RX 250: Performed by: STUDENT IN AN ORGANIZED HEALTH CARE EDUCATION/TRAINING PROGRAM

## 2017-12-27 PROCEDURE — 93010 ELECTROCARDIOGRAM REPORT: CPT | Performed by: INTERNAL MEDICINE

## 2017-12-27 PROCEDURE — 71010 XR CHEST PORT 1 VW: CPT

## 2017-12-27 PROCEDURE — 25000132 ZZH RX MED GY IP 250 OP 250 PS 637: Performed by: INTERNAL MEDICINE

## 2017-12-27 PROCEDURE — 40000133 ZZH STATISTIC OT WARD VISIT

## 2017-12-27 PROCEDURE — 71010 XR CHEST PORT 1 VW: CPT | Mod: 76

## 2017-12-27 PROCEDURE — 36592 COLLECT BLOOD FROM PICC: CPT | Performed by: STUDENT IN AN ORGANIZED HEALTH CARE EDUCATION/TRAINING PROGRAM

## 2017-12-27 PROCEDURE — 21400006 ZZH R&B CCU INTERMEDIATE UMMC

## 2017-12-27 PROCEDURE — 83735 ASSAY OF MAGNESIUM: CPT | Performed by: THORACIC SURGERY (CARDIOTHORACIC VASCULAR SURGERY)

## 2017-12-27 PROCEDURE — 25000125 ZZHC RX 250: Performed by: PHYSICIAN ASSISTANT

## 2017-12-27 PROCEDURE — 83605 ASSAY OF LACTIC ACID: CPT | Performed by: THORACIC SURGERY (CARDIOTHORACIC VASCULAR SURGERY)

## 2017-12-27 PROCEDURE — 93005 ELECTROCARDIOGRAM TRACING: CPT

## 2017-12-27 PROCEDURE — 80048 BASIC METABOLIC PNL TOTAL CA: CPT | Performed by: THORACIC SURGERY (CARDIOTHORACIC VASCULAR SURGERY)

## 2017-12-27 PROCEDURE — 25000132 ZZH RX MED GY IP 250 OP 250 PS 637: Performed by: HOSPITALIST

## 2017-12-27 PROCEDURE — 25000125 ZZHC RX 250: Performed by: SURGERY

## 2017-12-27 PROCEDURE — 25000132 ZZH RX MED GY IP 250 OP 250 PS 637: Performed by: ANESTHESIOLOGY

## 2017-12-27 PROCEDURE — 40000275 ZZH STATISTIC RCP TIME EA 10 MIN

## 2017-12-27 PROCEDURE — 00000146 ZZHCL STATISTIC GLUCOSE BY METER IP

## 2017-12-27 PROCEDURE — 97535 SELF CARE MNGMENT TRAINING: CPT | Mod: GO

## 2017-12-27 PROCEDURE — 25000132 ZZH RX MED GY IP 250 OP 250 PS 637: Performed by: PHYSICIAN ASSISTANT

## 2017-12-27 PROCEDURE — 25000128 H RX IP 250 OP 636: Performed by: PHYSICIAN ASSISTANT

## 2017-12-27 PROCEDURE — 92526 ORAL FUNCTION THERAPY: CPT | Mod: GN

## 2017-12-27 PROCEDURE — 94668 MNPJ CHEST WALL SBSQ: CPT

## 2017-12-27 PROCEDURE — 97110 THERAPEUTIC EXERCISES: CPT | Mod: GO

## 2017-12-27 PROCEDURE — 40000558 ZZH STATISTIC CVC DRESSING CHANGE

## 2017-12-27 PROCEDURE — 25000132 ZZH RX MED GY IP 250 OP 250 PS 637

## 2017-12-27 PROCEDURE — 40000802 ZZH SITE CHECK

## 2017-12-27 RX ORDER — OLANZAPINE 5 MG/1
5 TABLET ORAL 2 TIMES DAILY
Status: DISCONTINUED | OUTPATIENT
Start: 2017-12-27 | End: 2018-01-01

## 2017-12-27 RX ORDER — ACETYLCYSTEINE 200 MG/ML
2 SOLUTION ORAL; RESPIRATORY (INHALATION) EVERY 4 HOURS
Status: DISCONTINUED | OUTPATIENT
Start: 2017-12-27 | End: 2017-12-28

## 2017-12-27 RX ORDER — AMINO ACIDS/PROTEIN HYDROLYS 11G-40/45
1 LIQUID IN PACKET (ML) ORAL 3 TIMES DAILY
Status: DISCONTINUED | OUTPATIENT
Start: 2017-12-27 | End: 2018-01-04 | Stop reason: HOSPADM

## 2017-12-27 RX ORDER — OLANZAPINE 2.5 MG/1
2.5 TABLET, FILM COATED ORAL 2 TIMES DAILY
Status: DISCONTINUED | OUTPATIENT
Start: 2017-12-27 | End: 2017-12-27

## 2017-12-27 RX ORDER — FAMOTIDINE 20 MG/1
20 TABLET, FILM COATED ORAL 2 TIMES DAILY
Status: DISCONTINUED | OUTPATIENT
Start: 2017-12-27 | End: 2018-01-04 | Stop reason: HOSPADM

## 2017-12-27 RX ADMIN — HEPARIN SODIUM 5000 UNITS: 5000 INJECTION, SOLUTION INTRAVENOUS; SUBCUTANEOUS at 17:42

## 2017-12-27 RX ADMIN — MULTIVITAMIN 15 ML: LIQUID ORAL at 08:43

## 2017-12-27 RX ADMIN — OLANZAPINE 5 MG: 5 TABLET, FILM COATED ORAL at 21:27

## 2017-12-27 RX ADMIN — ATORVASTATIN CALCIUM 40 MG: 40 TABLET, FILM COATED ORAL at 08:42

## 2017-12-27 RX ADMIN — HEPARIN SODIUM 5000 UNITS: 5000 INJECTION, SOLUTION INTRAVENOUS; SUBCUTANEOUS at 09:28

## 2017-12-27 RX ADMIN — SENNOSIDES AND DOCUSATE SODIUM 1 TABLET: 8.6; 5 TABLET ORAL at 21:28

## 2017-12-27 RX ADMIN — NAFCILLIN SODIUM 2 G: 2 INJECTION, POWDER, LYOPHILIZED, FOR SOLUTION INTRAMUSCULAR; INTRAVENOUS at 21:18

## 2017-12-27 RX ADMIN — NAFCILLIN SODIUM 2 G: 2 INJECTION, POWDER, LYOPHILIZED, FOR SOLUTION INTRAMUSCULAR; INTRAVENOUS at 04:30

## 2017-12-27 RX ADMIN — NAFCILLIN SODIUM 2 G: 2 INJECTION, POWDER, LYOPHILIZED, FOR SOLUTION INTRAMUSCULAR; INTRAVENOUS at 09:25

## 2017-12-27 RX ADMIN — OLANZAPINE 5 MG: 5 TABLET, FILM COATED ORAL at 01:38

## 2017-12-27 RX ADMIN — MINERAL OIL AND WHITE PETROLATUM: 150; 830 OINTMENT OPHTHALMIC at 21:19

## 2017-12-27 RX ADMIN — NAFCILLIN SODIUM 2 G: 2 INJECTION, POWDER, LYOPHILIZED, FOR SOLUTION INTRAMUSCULAR; INTRAVENOUS at 12:38

## 2017-12-27 RX ADMIN — BUMETANIDE 2 MG: 2 TABLET ORAL at 08:42

## 2017-12-27 RX ADMIN — ALBUTEROL SULFATE 2.5 MG: 2.5 SOLUTION RESPIRATORY (INHALATION) at 20:35

## 2017-12-27 RX ADMIN — KETOTIFEN FUMARATE 1 DROP: 0.25 SOLUTION/ DROPS OPHTHALMIC at 21:19

## 2017-12-27 RX ADMIN — POTASSIUM CHLORIDE 20 MEQ: 1.5 POWDER, FOR SOLUTION ORAL at 17:42

## 2017-12-27 RX ADMIN — OLANZAPINE 5 MG: 5 TABLET, FILM COATED ORAL at 08:43

## 2017-12-27 RX ADMIN — KETOTIFEN FUMARATE 1 DROP: 0.25 SOLUTION/ DROPS OPHTHALMIC at 15:07

## 2017-12-27 RX ADMIN — POTASSIUM CHLORIDE 20 MEQ: 1.5 POWDER, FOR SOLUTION ORAL at 05:05

## 2017-12-27 RX ADMIN — CEFEPIME 2 G: 1 INJECTION, SOLUTION INTRAVENOUS at 03:32

## 2017-12-27 RX ADMIN — ALBUTEROL SULFATE 2.5 MG: 2.5 SOLUTION RESPIRATORY (INHALATION) at 16:22

## 2017-12-27 RX ADMIN — ALBUTEROL SULFATE 2.5 MG: 2.5 SOLUTION RESPIRATORY (INHALATION) at 05:20

## 2017-12-27 RX ADMIN — ACETYLCYSTEINE 2 ML: 100 SOLUTION ORAL; RESPIRATORY (INHALATION) at 08:24

## 2017-12-27 RX ADMIN — ALBUTEROL SULFATE 2.5 MG: 2.5 SOLUTION RESPIRATORY (INHALATION) at 11:52

## 2017-12-27 RX ADMIN — Medication 1 PACKET: at 21:29

## 2017-12-27 RX ADMIN — CEFEPIME 2 G: 1 INJECTION, SOLUTION INTRAVENOUS at 22:10

## 2017-12-27 RX ADMIN — ASPIRIN 81 MG CHEWABLE TABLET 81 MG: 81 TABLET CHEWABLE at 08:41

## 2017-12-27 RX ADMIN — BUMETANIDE 2 MG: 2 TABLET ORAL at 16:15

## 2017-12-27 RX ADMIN — ACETYLCYSTEINE 2 ML: 100 SOLUTION ORAL; RESPIRATORY (INHALATION) at 00:00

## 2017-12-27 RX ADMIN — FAMOTIDINE 20 MG: 20 TABLET ORAL at 21:26

## 2017-12-27 RX ADMIN — ACETYLCYSTEINE 2 ML: 100 SOLUTION ORAL; RESPIRATORY (INHALATION) at 05:21

## 2017-12-27 RX ADMIN — OXYCODONE HYDROCHLORIDE 5 MG: 5 TABLET ORAL at 01:38

## 2017-12-27 RX ADMIN — HEPARIN SODIUM 5000 UNITS: 5000 INJECTION, SOLUTION INTRAVENOUS; SUBCUTANEOUS at 01:38

## 2017-12-27 RX ADMIN — Medication 1 PACKET: at 15:07

## 2017-12-27 RX ADMIN — FAMOTIDINE 20 MG: 40 POWDER, FOR SUSPENSION ORAL at 08:42

## 2017-12-27 RX ADMIN — Medication 300 MG: at 21:28

## 2017-12-27 RX ADMIN — LOSARTAN POTASSIUM 25 MG: 25 TABLET ORAL at 21:27

## 2017-12-27 RX ADMIN — CEFEPIME 2 G: 1 INJECTION, SOLUTION INTRAVENOUS at 11:59

## 2017-12-27 RX ADMIN — ACETYLCYSTEINE 2 ML: 200 SOLUTION ORAL; RESPIRATORY (INHALATION) at 16:22

## 2017-12-27 RX ADMIN — ALBUTEROL SULFATE 2.5 MG: 2.5 SOLUTION RESPIRATORY (INHALATION) at 08:24

## 2017-12-27 RX ADMIN — NAFCILLIN SODIUM 2 G: 2 INJECTION, POWDER, LYOPHILIZED, FOR SOLUTION INTRAMUSCULAR; INTRAVENOUS at 16:23

## 2017-12-27 RX ADMIN — Medication 300 MG: at 08:41

## 2017-12-27 RX ADMIN — ACETYLCYSTEINE 2 ML: 200 SOLUTION ORAL; RESPIRATORY (INHALATION) at 11:53

## 2017-12-27 RX ADMIN — LOSARTAN POTASSIUM 25 MG: 25 TABLET ORAL at 08:43

## 2017-12-27 RX ADMIN — KETOTIFEN FUMARATE 1 DROP: 0.25 SOLUTION/ DROPS OPHTHALMIC at 08:44

## 2017-12-27 RX ADMIN — MIRTAZAPINE 30 MG: 15 TABLET, FILM COATED ORAL at 21:27

## 2017-12-27 NOTE — PROGRESS NOTES
[facility] ID SERVICE: ONGOING CONSULTATION   Cricket Miguel : 1955 Sex: male:   Medical record number [id number] Attending Physician: Tacho Muñoz MD  Date of Service: 2017    PROBLEM LIST:   - worsening aortic insufficiency with presumed moises-graft leak in the setting of a history of periaortic abscess s/p redo aortic root valve homograph    - MSSA bacteremia 2016, treated with 2 weeks of IV cefazolin. TTE negative.  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, currently on 3 month treatment with IV nafcillin and PO rifampin  - left knee MSSA septic arthritis 10/2017  - multifocal acute infarctions involving the left parietotemporal lobes, left cerebral hemisphere, and right occipital lobe presumed to be septic emboli  - pseudomonas HCAP     RECOMMENDATIONS:   - restart PTA nafcillin 2g IV q4h, previously planned duration until 18  - continue rifampin 300mg PO BID, previously planned duration until 18  -continue cefepime for 2 total weeks duration for pseudomonas pneumonia (end date 1/3/18)  - reassuring that intraoperative cultures are negative to date      DISCUSSION:   62 year old male with known 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 With negative intraoperative cultures to date, mechanical issue involving the valve most likely caused the dehiscence and not an ongoing infection.  He developed a fever morning of 12/20, and was found to have pseudomonas pneumonia which can be treated with PO ciprofloxacin. At this time, would prefer to change back to the preferred MSSA coverage, nafcillin. Anticipate a total 2 week course for the pseudomonas pneumonia. His original anticipated end date for the MSSA prosthetic valve endocarditis treatment with nafcillin and rifampin was 18.    ID Staff Recommendations   I spoke with [facility] CICU resident  regarding the recommendation to change Zosyn to cipro and restart nafcillin and continue PO rifampin. The patient has prolonged QT interval on EKG today and is on olanzapine for agitation. They are concerned that addition of the cipro would worsen the QT prolongation. I agree that this is a contraindication to adding the ciprofloxacin now and agree that the IV cefepime would be an acceptable alternative to treat the Pseudomonas in the lungs and to complete the planned three month treatment course for the MSSA prosthetic valve endocarditis with planned treatment course through 1/7/18. Will plan to complete the IV cefepime 2 grams Q 8 hours through 1/7/18 with the oral rifampin. Would recommend that you consider checking follow-up imaging of the brain towards the end of this planned course to verify that there are no residual brain abscesses from the septic emboli to the brain which occurred in Oct. 2017. If the tissue cultures from the cardiac surgery on 12/19/17 remain negative this should be an adequate course of antibiotics. If the tissue cultures turn positive then we will need to re-evaluate and extend antibiotic therapy longer.   Jena Carr MD  ID staff physician  Pager [phone]         CHIEF INFECTIOUS DISEASES COMPLAINT:  History of MSSA prosthetic valve endocarditis, now with pseudoaneurysm and moises-graft leak, s/p surgical repair. Operative cultures negative to date.     INTERVAL HISTORY:   Patient now on 6B out of the ICU. He is resting comfortably in bed without acute c/o. His primary team restarted nafcillin plus rifampin due to concern over rising WBC count on cefepime alone for Pseudomonas in the sputum. CV surgery advises not given cipro due to already prolonged QT interval and other drug interactions with pysch meds: olanzipine stared for agitation.     ROS: A five-point review of systems was obtained and was negative with the exception of that which is described above.  Allergies   Allergen Reactions      Lisinopril Swelling     One-sided facial swelling after being on lisinopril/HCTZ for one week.      Allergies were reviewed.    No current outpatient prescriptions on file.       nafcillin  2 g Intravenous Q4H     ceFEPIme (MAXIPIME) IV  2 g Intravenous Q8H     bumetanide  2 mg Oral BID     losartan  25 mg Oral Q12H KILO     OLANZapine  5 mg Oral BID     albuterol  2.5 mg Nebulization Q4H     famotidine  20 mg Oral or Feeding Tube Daily     polyethylene glycol  17 g Oral Daily     heparin  5,000 Units Subcutaneous Q8H     multivitamins with minerals  15 mL Per Feeding Tube Daily     sodium chloride (PF)  3 mL Intracatheter Q8H     insulin aspart   Subcutaneous TID w/meals     senna-docusate  1-2 tablet Oral BID     acetylcysteine  2 mL Nebulization Q4H     mirtazapine  30 mg Oral At Bedtime     rifampin  300 mg Oral or Feeding Tube BID     ketotifen  1 drop Left Eye TID     artificial tears   Ophthalmic At Bedtime     aspirin  81 mg Oral or Feeding Tube Daily     atorvastatin  40 mg Oral Daily         EXAMINATION:   /85  Pulse 118  Temp 98.7  F (37.1  C) (Axillary)  Resp 20  Wt 81.7 kg (180 lb 1.6 oz)  SpO2 97%  BMI 27.38 kg/m2  GENERAL:  No distress. Sleepy but attentive  EYES:  Eyes have anicteric sclerae without conjunctival injection.  ENT:  Scar from prior trach  NECK:  Supple. No cervical lymphadenopathy.  LUNGS:  Coarse bilaterally on anterior exam.   CARDIOVASCULAR:  Regular rate and rhythm with no murmurs, gallops or rubs. 1 chest tube remains  ABDOMEN:  Normal bowel sounds, soft, nontender. PEG c/d/i  SKIN:  No acute rashes. Line(s) are in place without any surrounding erythema or exudate.       NEW DATA/RESULTS:   All interval basic labs, microbiology results and imaging were reviewed.    Culture Micro   Date Value Ref Range Status   12/25/2017 No growth after 1 day  Preliminary   12/25/2017 No growth after 1 day  Preliminary   12/21/2017 Light growth  Pseudomonas aeruginosa   (A)  Final    12/21/2017 Susceptibility testing done on previous specimen  Final   12/20/2017 Heavy growth  Pseudomonas aeruginosa   (A)  Final   12/20/2017 (A)  Final    Light growth  Candida albicans / dubliniensis  Candida albicans and Candida dubliniensis are not routinely speciated  Susceptibility testing not routinely done     12/20/2017   Final    Canceled, Test credited  Incorrectly ordered by PCU/Clinic  see ACTIN CULTURE instead     12/20/2017   Final    Notification of test cancellation was given to  Noah Hood RN at 1448 12.20.17.      12/20/2017 Culture negative monitoring continues  Preliminary       Recent Labs   Lab Test  12/25/17   1655  12/20/17   0748  12/17/17   0730  10/23/17   0341  10/21/17   0335  10/16/17   0323   CRP  90.0*  87.0*  65.0*  30.0*  44.0*  48.0*     Recent Labs   Lab Test  12/26/17   0623  12/25/17   0809  12/23/17   0420  12/21/17   0154  12/20/17   0412  12/20/17   0002   WBC  15.8*  14.1*  11.1*  13.4*  15.5*  16.3*     Recent Labs   Lab Test  12/26/17   0623  12/25/17   0951  12/24/17   0427  12/23/17   0420   CR  0.77  0.80  0.95  1.17   GFRESTIMATED  >90  >90  80  63       Hematology Studies  Recent Labs   Lab Test  12/26/17   0623  12/25/17   0809  12/23/17   0420  12/22/17   0401  12/21/17   0154  12/20/17   0412  12/20/17   0002   12/17/17   0019  12/16/17   1935   10/06/17   1020   05/06/13   1703  12/04/12   1016  02/02/11   1506   WBC  15.8*  14.1*  11.1*   --   13.4*  15.5*  16.3*   < >  11.2*  9.8   < >  17.1*   < >  10.6  11.9*  11.9*   ANEU   --    --    --    --    --    --    --    --   7.8  6.6   --   15.7*   --   7.2  7.9  8.2   AEOS   --    --    --    --    --    --    --    --   1.1*  1.0*   --   0.0   --   0.0  0.4  0.3   HCT  28.1*  28.1*  25.5*   --   25.0*  24.0*  21.4*   < >  34.0*  31.5*   < >  43.8   < >  46.2  47.4  46.8   PLT  370  364  270  210  250  358  338   < >  420  391   < >  185   < >  315  259  315    < > = values in this interval not displayed.        Metabolic  Recent Labs   Lab Test  12/26/17   0623  12/25/17   0951  12/24/17   0427   NA  141  140  142   BUN  35*  29  29   CO2  26  26  28   CR  0.77  0.80  0.95   GFRESTIMATED  >90  >90  80       Hepatic Studies  Recent Labs   Lab Test  12/18/17   0359  12/17/17 2024 12/17/17   0019   BILITOTAL  1.3  1.0  1.3   ALKPHOS  92  94  102   ALBUMIN  1.5*  1.6*  1.6*   AST  32  29  33   ALT  30  32  34       Immunologlobulins  Recent Labs   Lab Test  10/23/17   0341  10/21/17   0357  05/06/13   1703   SED  122*  108*  6

## 2017-12-27 NOTE — PROGRESS NOTES
CLINICAL NUTRITION SERVICES - REASSESSMENT NOTE     Nutrition Prescription    RECOMMENDATIONS FOR MDs/PROVIDERS TO ORDER:  1. Adjust TFs if needed, pending kcal counts. Once consuming 1430 kcals and 70 g protein daily, then discontinue TFs.   2. Adjust free water flushes if needed, pending fluid status (free water flushes are currently for patency only).     Malnutrition Status:    Non-severe malnutrition in the context of chronic illness    Recommendations already ordered by Registered Dietitian (RD):  Changed TFs to bolus, supplemental. Modified free water flushes.  Ordered kcal counts 12/27-12/19.  Oral supplements    Future/Additional Recommendations:  1. Diet as per SLP.   2. Rec thiamine (100 mg/day) if pt has NYHA Class III-IV heart failure.     3. Continue certavite as ordered to help meet micronutrient needs.  4. Consider checking folic acid and vitamin B12.     EVALUATION OF THE PROGRESS TOWARD GOALS   Diet: Full liquids as of 12/22. On a 2 L fluid restriction. He had been on a regular diet 12/18-12/19.   Intake: Per discussion with pt's wife, pt is making an effort to eat. Nursing flowsheets indicate pt consuming 50% of meals 12/22, 75% of meals 12/23, 25-75% of meals with a good appetite 12/24, and 75% of meals with a good appetite 12/26. Pt was busy with nursing cares. Per pt's wife, he did not like Ensure Plus at home.     Nutrition Support:   - Feeding Tube (FT) access: PEG placed Oct 2017  - TF regimen:  Impact Peptide at 65 ml/hr provides  2340 kcals, 147 g PRO, 1201 ml free H2O, 100 g Fat, 218 g CHO and no Fiber daily.  - Feeding Tube Flushes:  30 mL Q 4 hrs  - Intake:  Pt received a five-day TF average of 1411 mL/day. This provided 2117 kcals and 133 g protein and does meet pt's estimated needs below.     NEW FINDINGS   Wt hx: 89.4 kg (11/30/16), 91.6 kg (2/6/17), 88. 3 kg (11/2/17), 83 kg (12/15/17) - Pt has lost 9% of his body wt over the past 13 months. Has been diuresed this admission.      ASSESSED NUTRITION NEEDS (updated):  Dosing Weight: 78 kg (based on lowest wt this admission of 78 kg on 12/18)  Estimated Energy Needs: 5949-5699 kcals/day (25 - 30 kcals/kg)  Justification: Maintenance  Estimated Protein Needs:  grams protein/day (1.2 - 1.5 grams of pro/kg)  Justification: Hypercatabolism with acute illness and Repletion  Estimated Fluid Needs: 2000 mL/day  Justification: On a fluid restriction    MALNUTRITION  % Intake: Not meeting this criteria.     % Weight Loss: Weight loss does not meet criteria. Difficult to assess with diuresis    Subcutaneous Fat Loss: Upper arm: and Lower arm: mild. Pt was busy with nursing cares. Per pt's wife, has noticed generalized muscle loss.  Muscle Loss: Temporal: Moderate. Upper arm (bicep, tricep), Lower arm  (forearm)  and Upper leg (quadricep, hamstring): Severe. Pt was busy with nursing cares.   Fluid Accumulation/Edema: Does not meet criteria  Malnutrition Diagnosis: Non-severe malnutrition in the context of chronic illness    Previous Goals   Total avg nutritional intake to meet a minimum of 25 kcal/kg and 1.2 g PRO/kg daily (per dosing wt 83 kg).  Evaluation: Met    Previous Nutrition Diagnosis  Inadequate protein-energy intake related to NPO status post-op as evidenced by no TFs yet started via PEG tube to provide any additional kcals/PRO this admission (NPO 1-2 days w/ prior unclear intakes, likely low d/t 50% of a meal recorded)  Evaluation: Resolved. Changed to new nutrition dx below.    CURRENT NUTRITION DIAGNOSIS  Inadequate oral intake related to recent diet advancement as evidenced by pt consuming 25% of meals at times.      INTERVENTIONS  Implementation  Collaboration with other providers: Per discussion with team, ok to transition to bolus TFs. RN is aware of transition to bolus TFs.  Enteral Nutrition: Changed TFs to bolus, supplemental to help encourage oral intake. New TF regimen: TwoCal HN, 2 cans daily bolused at 10:00 and 16:00 or  timing per pt preference, provides 474 mL TF, 948 kcals, 40 g protein, 332 mL H2O, 104 g CHO, and 2 g fiber daily (note, a 1/2 can bolus will be administered first to assess tolerance today at ~12:00). Ordered Prosource TF modular, 1 pkt TID. Each packet of ProSource TF modular provides 40 kcals and 11 g protein.   Feeding tube flush: Ordered 60 mL before and after TFs boluses  Ordered kcal counts 12/27-12/19  Medical food supplement therapy: Placed order for Gelatein Plus at 14:00. Also, provided oral supplement menu to pt's wife. Placed prn supplement order to offer oral supplements at meals.    Goals  Total average nutrition intake to meet 6527-8253 kcals/day (25 - 30 kcals/kg) and  grams protein/day (1.2 - 1.5 grams of pro/kg).    Monitoring/Evaluation  Progress toward goals will be monitored and evaluated per protocol.     Nutrition will continue to follow.      Claire Kwong, MS, RD, LD, [facility]   6C Pgr:  [phone]

## 2017-12-27 NOTE — PROGRESS NOTES
"Social Work Services Progress Note    Hospital Day: 11  Date of Initial Social Work Evaluation:  12/26/17  Collaborated with: PtVERONICA Meghna    Data:  Pt is a 62 year old male being followed by SW for placement to rehab.    Intervention:  SW met with pt and SO Meghna to discuss rehab placement.  Meghna requesting facilities that have strong stroke rehab program as pt had a stroke in October and had received good care from home.  SO states she was \"very happy\" with [facility] and would be open to using them again after rehab placement.  SO will be touring facilities near the [facility] and will notify SW tomorrow of where she would prefer referrals.  SO ok with continuing the referral to  [facility] at this time.  SW to follow up with facilities tomorrow.    Assessment:  Pt alert and eating with assistance from SO.  Pt able to say \"thank you\" when talking to SW.  SO in agreement with rehab placement after [facility].    Plan:    Anticipated Disposition:  Facility:  TCU     Barriers to d/c plan:  Medical stability, chest tubes    Follow Up: SW to follow for discharge planning and placement.    ANTON Church, APSW  6C Unit   Phone: [phone]  Pager: [phone]  Unit: [phone]            "

## 2017-12-27 NOTE — PROGRESS NOTES
CVTS Progress Note  Attending provider: Pili Bowers,*      SUBJECTIVE:    No acute issues over night. Some increased anxiety  No acute complaints, reports feels good today, pain well controlled.  Patient denies SOB, CP, fever, chills, sweats.     Patient has been tolerating diet. + BM. + flatus.  NSR to sinus tach.       OBJECTIVE:   Temp:  [98.1  F (36.7  C)-98.9  F (37.2  C)] 98.1  F (36.7  C)  Heart Rate:  [101-112] 112  Resp:  [16-22] 16  BP: (103-142)/(83-99) 126/89  SpO2:  [97 %-100 %] 97 %    Gen: NAD, more alert today  CV: RRR, normal S1, S2, no murmurs, rubs or gallops   Pulm: Fine crackles in bases, otherwise CTA, No wheezing or rhonchi  Abd: Soft, NT, ND, +BS  Ext: Trace LE edema  Neuro: Nonfocal  Incision: c/d/i, no erythema, sternum stable  Tubes/drains: Dressing clean and dry, minimal serosanguinous output, clamped at 0300, no air leak.     I&O's:  I/O last 3 completed shifts:  In: 3295 [P.O.:640; I.V.:870; NG/GT:290]  Out: 2180 [Urine:2175; Chest Tube:5]    Labs:   BMP    Recent Labs  Lab 12/27/17  0430 12/26/17  0623 12/25/17  0951 12/24/17  0427    141 140 142   POTASSIUM 3.8 4.0 4.0 3.9   CHLORIDE 106 107 106 107   IZABELLA 8.6 8.9 8.9 9.0   CO2 28 26 26 28   BUN 41* 35* 29 29   CR 0.74 0.77 0.80 0.95   * 128* 146* 142*     CBC    Recent Labs  Lab 12/27/17  0430 12/26/17  0623 12/25/17  0809 12/23/17  0420   WBC 14.0* 15.8* 14.1* 11.1*   RBC 2.77* 2.80* 2.83* 2.64*   HGB 8.4* 8.5* 8.6* 7.9*   HCT 27.7* 28.1* 28.1* 25.5*    100 99 97   MCH 30.3 30.4 30.4 29.9   MCHC 30.3* 30.2* 30.6* 31.0*   RDW 18.3* 18.0* 18.2* 18.3*    370 364 270     INR    Recent Labs  Lab 12/21/17  0154   INR 1.35*      Hepatic Panel No lab results found in last 7 days.  Glucose  Recent Labs  Lab 12/27/17  0846 12/27/17  0430 12/26/17  1730 12/26/17  1142 12/26/17  0851 12/26/17  0623 12/26/17  0141 12/25/17  1915  12/25/17  0951  12/24/17  0427  12/23/17  0420  12/22/17  0401   GLC  --  159*   --   --   --  128*  --   --   --  146*  --  142*  --  121*  --  112*   *  --  115* 147* 143*  --  119* 134*  < >  --   < >  --   < >  --   < >  --    < > = values in this interval not displayed.    Imaging:   CXR 12/26:  1. Mildly increased trace left apical pneumothorax.   2. Stable appearing bilateral patchy opacities, worse on right.  3. Stable small left sided pleural effusion.  4. Stable appearing support devices as listed above.    CXR 12/27 (6 hour clamp trial)  Reviewed, appears stable, no significant change in left apical pneumothorax, official read pending.      ASSESSMENT: Cricket Miguel is a 62 year old male with hx of COPD, HTN, HLD, CAD, heart failure with preserved EF, aortic stenosis and ascending aortic aneurysm s/p aortic valve replacement and aortic root replacement (April 2016) c/b moises-aortic abscess, endocarditis, cerebral septic emboli and severe AI s/p redo-sternotomy aortic root and valve homograft 12/19.       PLAN:   Neuro/ pain/ sedation: Hx of multifocal acute infarctions involving the left parietotemporal lobes, left cerebral hemisphere and right occipital lobe presumed to be septic emboli. Depression, mild delirium, followed by psych.  -Monitor neurological status. Notify the MD for any acute changes in exam.  -Dilaudid PRN.  -Mental status improving.  - On psych rec started olanzapine. Agitation improving. Serial EKGs to evaluate QT interval. QTc today 479. Psych recommended decreasing zyprexa given increased sedation, however much more anxious this AM, will keep at 5 mg     Pulmonary care: extubated 12/20  - On facemask 2 LPM saturation 100%.  - Encourage pulmonary toilet  - Nasotracheal suctioning  - Removed right chest tube 12/26   - Left pleural tube clamping trial this AM without change in pneumo, removed without immediate complication, repeat CXR this PM.      Cardiovascular:  HTN, HLD, CAD, AS and aortic aneurysm s/p bentall c/b endocarditis and severe AI s/p aortic  root/valve homograft 12/19.  -Keep SBP<120  -Isordil/hydralazine started per cardiology team for elevated MAPs, improving  - ASA 81mg and atorvastatin.  - Echo shows EF 20-25% baseline 30%   - No bradycardia or pauses, has been in NSR for past 3 days, TPW removed 12/26     GI care: PEG  -famotidine  -senna/colace/miralax, +BM, cdiff negative 12/26      Fluids/ Electrolytes/ Nutrition:   -PRN electrolyte replacement  -Bolus tube feeds through PEG, dajuan counts     Renal/ Fluid Balance: Urine output is adequate so far.  -Garza catheter for strict intake and output.  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 Hx of MSSA septic arthritis and bacteremia. Hx of multifocal acute infarctions involving the left parietotemporal lobes, left cerebral hemisphere and right occipital lobe presumed to be septic emboli. ID following.   -New cultures BAL growing pseudomonas aeruginosa. ID following. Continue nafcillin/cefepime/rifampin per ID. WBC 15.8->14  -F/u surgical tissue cultures      Heme: acute blood loss anemia  - Hgb stable.      Prophylaxis:    -Mechanical prophylaxis for DVT.   -Heparin TID.       Lines/ tubes/ drains:  -R triple lumen PICC, garza, PIVs      Disposition:  -Floor since 12/23     Staff surgeons have been informed of changes through both verbal and written communication.         Benny Unger PA-C  Cardiothoracic Surgery  December 27, 2017 9:27 AM   p: [phone]

## 2017-12-27 NOTE — PLAN OF CARE
Problem: Patient Care Overview  Goal: Plan of Care/Patient Progress Review  Outcome: Improving  D/A/I. Pt alert, disoriented to time/situation. Left facial droop, right arm and leg hemiparesis. Tele is sinus tach. Continual tube feeds discontinued, received 1/2 bolus feed at 1200, plan for 1 full can at 1600 FL diet. Loose stools x3 today. Incontinent of urine and stool, condom cath removed. Zyprexa given for restlessness. Chest tube removed today. Sepsis protocol triggered, lactic of 1.3. Up with lift.  P. Plan to transfer to TCU this weekend. SO at bedside and updated.

## 2017-12-27 NOTE — PROGRESS NOTES
[facility]  CARDIOLOGY DAILY PROGRESS NOTE    Interval History:   Some confusion and agitation overnight  Chest tubes clamped and removed  Incontinent of urine and stool  Stopped continuous tube feeds  Lactate 1.3  Likely dc to TCU this weekend    Objective  Temp:  [98.6  F (37  C)-98.9  F (37.2  C)] 98.7  F (37.1  C)  Heart Rate:  [101-112] 112  Resp:  [20-22] 20  BP: (103-145)/(83-99) 110/85  SpO2:  [97 %-100 %] 97 %   Room air to 2L NC    I/O last 3 completed shifts:  In: 2565 [P.O.:640; I.V.:560; NG/GT:260]  Out: 1630 [Urine:1625; Chest Tube:5]     IN 2565, 780  OUT 1630, 550    Wt Readings from Last 5 Encounters:   12/26/17 81.7 kg (180 lb 1.6 oz)   12/15/17 83 kg (183 lb)   11/02/17 88.3 kg (194 lb 10.7 oz)   02/06/17 91.6 kg (202 lb)   01/09/17 87.5 kg (193 lb)     GEN: no acute distress  HEENT: no icterus  CV: RRR, normal s1/s2, no murmurs/rubs/s3/s4, no heave. JVP at 8-9 cm.   CHEST: on 2L NC, normal work of breathing, clear to ausculation bilaterally, no rales or wheezing  ABD: soft, non-tender, normal active bowel sounds  EXTR: pulses 2+ and equal. No clubbing, cyanosis.  Trace lower extremity edema.   NEURO: slight dysarthria, left facial droop, disoriented occasionally    Current Medications    nafcillin  2 g Intravenous Q4H     ceFEPIme (MAXIPIME) IV  2 g Intravenous Q8H     bumetanide  2 mg Oral BID     losartan  25 mg Oral Q12H KILO     OLANZapine  5 mg Oral BID     albuterol  2.5 mg Nebulization Q4H     famotidine  20 mg Oral or Feeding Tube Daily     polyethylene glycol  17 g Oral Daily     heparin  5,000 Units Subcutaneous Q8H     multivitamins with minerals  15 mL Per Feeding Tube Daily     sodium chloride (PF)  3 mL Intracatheter Q8H     insulin aspart   Subcutaneous TID w/meals     senna-docusate  1-2 tablet Oral BID     acetylcysteine  2 mL Nebulization Q4H     mirtazapine  30 mg Oral At Bedtime     rifampin  300 mg Oral or Feeding Tube BID     ketotifen  1 drop Left  Eye TID     artificial tears   Ophthalmic At Bedtime     aspirin  81 mg Oral or Feeding Tube Daily     atorvastatin  40 mg Oral Daily       IV fluid REPLACEMENT ONLY       IV fluid REPLACEMENT ONLY       Reason beta blocker order not selected       - MEDICATION INSTRUCTIONS -       Lab Values    Recent Labs  Lab 12/27/17  0430 12/26/17  0623 12/25/17  0951 12/24/17  0427    141 140 142   POTASSIUM 3.8 4.0 4.0 3.9   CHLORIDE 106 107 106 107   IZABELLA 8.6 8.9 8.9 9.0   CO2 28 26 26 28   BUN 41* 35* 29 29   CR 0.74 0.77 0.80 0.95   * 128* 146* 142*       Recent Labs  Lab 12/27/17  0430 12/26/17  0623 12/25/17  0809 12/23/17  0420   WBC 14.0* 15.8* 14.1* 11.1*   RBC 2.77* 2.80* 2.83* 2.64*   HGB 8.4* 8.5* 8.6* 7.9*   HCT 27.7* 28.1* 28.1* 25.5*    100 99 97   MCH 30.3 30.4 30.4 29.9   MCHC 30.3* 30.2* 30.6* 31.0*   RDW 18.3* 18.0* 18.2* 18.3*    370 364 270     Studies  EKG: low voltage, sinus rhythm     Transthoracic echocardiogram:   Left ventricular wall thickness is normal. Mild left ventricular dilation is present. The Ejection Fraction is estimated at 30-35%. Diastolic function not assessed due to tachycardia. Severe diffuse hypokinesis is present.     The right ventricle is normal size. Global right ventricular function is mildly reduced.  Both atria appear normal.  Trace mitral insufficiency is present.  The tricuspid valve is normal.     Vessels  The patient is post bio-Bentall - aortic root replacement with a 30 mm graft and AVR with a Trifecta valve. Today, there is circumferential free space noted surrounding the aortic graft, dehiscence and rocking of the graft from the native aorta and severe AI in the space surrounding the aortic graft (perivalvular/perigraft). The circumferential free space was present on the  previous studies of October 2017 but was filled with echodense material and the graft was not independently mobile, and there was no significant AI. The prosthetic aortic  valve leaflets are not well visualized today.     No pericardial effusion is present.     Compared to Previous Study  This study was compared with the study from 10/06/2017 . Since the prior study, the LV function has worsened and the aortic graft appears to be dehisced, as described above.    CXR  Impression:   1. Small left pleural effusion and associated retrocardiac subsegmental atelectasis and/or consolidation.  2. Stable postsurgical changes and supporting devices.   3. Stable pulmonary vascular congestion.      Assessment and Recommendation:  Cricket Miguel is a 62 year old male admitted with worsening dyspnea, thought to have acute severe AI on echocardiogram.  Pertinent history of aortic stenosis and ascending aortic aneurysm s/p AVR and root replacement April 2016.  Had complications of moises aortic abscess, endocarditis, and septic emboli to his brain.  He underwent redo sternotomy with aortic root and valve homograft 12/19.  Heart failure service is being consulted for newly reduced EF 20-25%.  Most likely related to  severe AI and recent surgery, and should improve over time.  Does not appear ischemic.     On losartan 25mg BID (switched from isordil/hydral combo since his renal function has improved back to normal).  He has lisinopril allergy documented of left sided facial swelling, doubt that is actual angioedema but will watch.  His blood pressure overall has been increasing steadily.  If his pressures tolerate, likely can start a beta blocker in a few days.    On bumex 2mg PO BID.  Nafcillin typically has a higher sodium load.      I have discussed the above with Dr. Browne.      Thank you for consulting the cardiovascular services at the [facility]. Please do not hesitate to call us with any questions.      Pardeep Cruz MD PGY4  Cardiology Fellow  [phone]        Pt's condition and care plan discussed with fellow but patient not seen personally by me  today.    Shakila Browne MD  Section Head - Advanced Heart Failure, Transplantation and Mechanical Circulatory Support  Co-Director - Adult Congenital [facility] Cardiovascular [facility]etics [facility]  Associate Professor of Medicine, [facility]

## 2017-12-27 NOTE — PLAN OF CARE
Problem: Patient Care Overview  Goal: Plan of Care/Patient Progress Review  Outcome: No Change  Pt transfers to 6C at 6:30pm. Reports given to floor nurse. Family was present during transfer

## 2017-12-27 NOTE — PROGRESS NOTES
Pt arrived to 6C. Family present. Orientated pt and family to surroundings. Pt is alert to self. Tube feeds running at 65cc/hour. Condom catheter changed. Call light within reach. Continue with POC.

## 2017-12-27 NOTE — PLAN OF CARE
Problem: Patient Care Overview  Goal: Plan of Care/Patient Progress Review  Discharge Planner SLP   Patient plan for discharge: Unknown  Current status: Pt continues to demonstrate poor oral awareness and control resulting in residuals and elevating aspiration risk. Pt requiring supervision and frequent checks for residuals. Recommend continue full liquid diet with supervision/assist. Pt to sit upright for all PO, take small sips/bites, alternate consistencies, and check oral cavity for oral residue prior to taking bite/sip. Hold PO if positive s/sx of aspiration occur. ST to follow for PO tolerance.  Barriers to return to prior living situation: AMS, medical status, swallow function below baseline  Recommendations for discharge: TCU  Rationale for recommendations: Anticipate Pt will require ongoing SLP services at next level of care as swallow is not at baseline       Entered by: Kinjal Dyson 12/27/2017 3:10 PM

## 2017-12-27 NOTE — PROGRESS NOTES
Pt confused at times, had one episode in the middle of the night where pt seemed very scared/confused/agitated wanting to get out of bed, pt given prn zyprexa, and oxycodone for pain that pt stated he had, both helped pt to sleep for the rest of the night.  Tele sinus tach with a rate of 110-120.  On room air.  TF at goal rate of 65 ml/hr, pt voiding adequately in condom cath. CT clamped at 0300. K replaced

## 2017-12-27 NOTE — PLAN OF CARE
Problem: Patient Care Overview  Goal: Plan of Care/Patient Progress Review  Discharge Planner OT   Patient plan for discharge: Pt did not state  Current status: Pt completed simple g/h tasks with tray table set up and mod/max A, intermittently dozing off. Attempted alerting activity with cool wash cloth, tactile prompts/input with minimal success. Completed UE ROM exercises with Circle A. Pt fatigued and dozing off throughout session.  Barriers to return to prior living situation: dependence for ADLs, cognition  Recommendations for discharge: Per plan established by the OT, the recommendation for dc location isTCU  Rationale for recommendations: to increase ADL I and tolerance

## 2017-12-28 ENCOUNTER — RECORDS - HEALTHEAST (OUTPATIENT)
Dept: ADMINISTRATIVE | Facility: OTHER | Age: 62
End: 2017-12-28

## 2017-12-28 ENCOUNTER — APPOINTMENT (OUTPATIENT)
Dept: GENERAL RADIOLOGY | Facility: CLINIC | Age: 62
DRG: 219 | End: 2017-12-28
Attending: PHYSICIAN ASSISTANT
Payer: COMMERCIAL

## 2017-12-28 ENCOUNTER — APPOINTMENT (OUTPATIENT)
Dept: SPEECH THERAPY | Facility: CLINIC | Age: 62
DRG: 219 | End: 2017-12-28
Attending: INTERNAL MEDICINE
Payer: COMMERCIAL

## 2017-12-28 ENCOUNTER — APPOINTMENT (OUTPATIENT)
Dept: OCCUPATIONAL THERAPY | Facility: CLINIC | Age: 62
DRG: 219 | End: 2017-12-28
Attending: INTERNAL MEDICINE
Payer: COMMERCIAL

## 2017-12-28 LAB
ANION GAP SERPL CALCULATED.3IONS-SCNC: 10 MMOL/L (ref 3–14)
BUN SERPL-MCNC: 38 MG/DL (ref 7–30)
CALCIUM SERPL-MCNC: 9.5 MG/DL (ref 8.5–10.1)
CHLORIDE SERPL-SCNC: 104 MMOL/L (ref 94–109)
CO2 SERPL-SCNC: 26 MMOL/L (ref 20–32)
CREAT SERPL-MCNC: 0.84 MG/DL (ref 0.66–1.25)
ERYTHROCYTE [DISTWIDTH] IN BLOOD BY AUTOMATED COUNT: 18.7 % (ref 10–15)
GFR SERPL CREATININE-BSD FRML MDRD: >90 ML/MIN/1.7M2
GLUCOSE BLDC GLUCOMTR-MCNC: 135 MG/DL (ref 70–99)
GLUCOSE BLDC GLUCOMTR-MCNC: 148 MG/DL (ref 70–99)
GLUCOSE BLDC GLUCOMTR-MCNC: 163 MG/DL (ref 70–99)
GLUCOSE SERPL-MCNC: 116 MG/DL (ref 70–99)
HCT VFR BLD AUTO: 30.1 % (ref 40–53)
HGB BLD-MCNC: 9 G/DL (ref 13.3–17.7)
LACTATE BLD-SCNC: 1 MMOL/L (ref 0.7–2)
MCH RBC QN AUTO: 30.4 PG (ref 26.5–33)
MCHC RBC AUTO-ENTMCNC: 29.9 G/DL (ref 31.5–36.5)
MCV RBC AUTO: 102 FL (ref 78–100)
PLATELET # BLD AUTO: 477 10E9/L (ref 150–450)
POTASSIUM SERPL-SCNC: 4.2 MMOL/L (ref 3.4–5.3)
RBC # BLD AUTO: 2.96 10E12/L (ref 4.4–5.9)
SODIUM SERPL-SCNC: 139 MMOL/L (ref 133–144)
WBC # BLD AUTO: 14 10E9/L (ref 4–11)

## 2017-12-28 PROCEDURE — 36415 COLL VENOUS BLD VENIPUNCTURE: CPT | Performed by: PHYSICIAN ASSISTANT

## 2017-12-28 PROCEDURE — 25000132 ZZH RX MED GY IP 250 OP 250 PS 637

## 2017-12-28 PROCEDURE — 97110 THERAPEUTIC EXERCISES: CPT | Mod: GO

## 2017-12-28 PROCEDURE — 85027 COMPLETE CBC AUTOMATED: CPT | Performed by: PHYSICIAN ASSISTANT

## 2017-12-28 PROCEDURE — 25000132 ZZH RX MED GY IP 250 OP 250 PS 637: Performed by: ANESTHESIOLOGY

## 2017-12-28 PROCEDURE — 40000802 ZZH SITE CHECK

## 2017-12-28 PROCEDURE — 25000128 H RX IP 250 OP 636: Performed by: PHYSICIAN ASSISTANT

## 2017-12-28 PROCEDURE — 94640 AIRWAY INHALATION TREATMENT: CPT

## 2017-12-28 PROCEDURE — 25000132 ZZH RX MED GY IP 250 OP 250 PS 637: Performed by: INTERNAL MEDICINE

## 2017-12-28 PROCEDURE — 94668 MNPJ CHEST WALL SBSQ: CPT

## 2017-12-28 PROCEDURE — 25000132 ZZH RX MED GY IP 250 OP 250 PS 637: Performed by: HOSPITALIST

## 2017-12-28 PROCEDURE — 71010 XR CHEST PORT 1 VW: CPT

## 2017-12-28 PROCEDURE — 25000132 ZZH RX MED GY IP 250 OP 250 PS 637: Performed by: PHYSICIAN ASSISTANT

## 2017-12-28 PROCEDURE — 40000809 ZZH STATISTIC NO DOCUMENTATION TO SUPPORT CHARGE

## 2017-12-28 PROCEDURE — 21400006 ZZH R&B CCU INTERMEDIATE UMMC

## 2017-12-28 PROCEDURE — 00000146 ZZHCL STATISTIC GLUCOSE BY METER IP

## 2017-12-28 PROCEDURE — 25000132 ZZH RX MED GY IP 250 OP 250 PS 637: Performed by: THORACIC SURGERY (CARDIOTHORACIC VASCULAR SURGERY)

## 2017-12-28 PROCEDURE — 25000125 ZZHC RX 250: Performed by: PHYSICIAN ASSISTANT

## 2017-12-28 PROCEDURE — 27210913 ZZH NUTRITION PRODUCT INTERMEDIATE PACKET

## 2017-12-28 PROCEDURE — 80048 BASIC METABOLIC PNL TOTAL CA: CPT | Performed by: PHYSICIAN ASSISTANT

## 2017-12-28 PROCEDURE — 40000225 ZZH STATISTIC SLP WARD VISIT

## 2017-12-28 PROCEDURE — 97530 THERAPEUTIC ACTIVITIES: CPT | Mod: GO

## 2017-12-28 PROCEDURE — 40000133 ZZH STATISTIC OT WARD VISIT

## 2017-12-28 PROCEDURE — 92526 ORAL FUNCTION THERAPY: CPT | Mod: GN

## 2017-12-28 PROCEDURE — 83605 ASSAY OF LACTIC ACID: CPT | Performed by: STUDENT IN AN ORGANIZED HEALTH CARE EDUCATION/TRAINING PROGRAM

## 2017-12-28 PROCEDURE — 25000125 ZZHC RX 250: Performed by: INTERNAL MEDICINE

## 2017-12-28 PROCEDURE — 36592 COLLECT BLOOD FROM PICC: CPT | Performed by: STUDENT IN AN ORGANIZED HEALTH CARE EDUCATION/TRAINING PROGRAM

## 2017-12-28 PROCEDURE — 25000132 ZZH RX MED GY IP 250 OP 250 PS 637: Performed by: SURGERY

## 2017-12-28 PROCEDURE — 99232 SBSQ HOSP IP/OBS MODERATE 35: CPT | Mod: GC | Performed by: INTERNAL MEDICINE

## 2017-12-28 RX ORDER — ALBUTEROL SULFATE 0.83 MG/ML
2.5 SOLUTION RESPIRATORY (INHALATION) 3 TIMES DAILY
Status: DISCONTINUED | OUTPATIENT
Start: 2017-12-28 | End: 2017-12-31

## 2017-12-28 RX ORDER — CARVEDILOL 3.12 MG/1
3.12 TABLET ORAL 2 TIMES DAILY WITH MEALS
Status: DISCONTINUED | OUTPATIENT
Start: 2017-12-28 | End: 2018-01-01

## 2017-12-28 RX ORDER — LORAZEPAM 0.5 MG/1
0.5 TABLET ORAL 2 TIMES DAILY PRN
Status: DISCONTINUED | OUTPATIENT
Start: 2017-12-28 | End: 2018-01-04 | Stop reason: HOSPADM

## 2017-12-28 RX ORDER — ACETYLCYSTEINE 100 MG/ML
4 SOLUTION ORAL; RESPIRATORY (INHALATION) 3 TIMES DAILY
Status: DISCONTINUED | OUTPATIENT
Start: 2017-12-28 | End: 2017-12-31

## 2017-12-28 RX ADMIN — FAMOTIDINE 20 MG: 20 TABLET ORAL at 08:38

## 2017-12-28 RX ADMIN — ATORVASTATIN CALCIUM 40 MG: 40 TABLET, FILM COATED ORAL at 08:37

## 2017-12-28 RX ADMIN — MIRTAZAPINE 30 MG: 15 TABLET, FILM COATED ORAL at 21:44

## 2017-12-28 RX ADMIN — ALBUTEROL SULFATE 2.5 MG: 2.5 SOLUTION RESPIRATORY (INHALATION) at 09:20

## 2017-12-28 RX ADMIN — Medication 1 PACKET: at 20:36

## 2017-12-28 RX ADMIN — LOSARTAN POTASSIUM 25 MG: 25 TABLET ORAL at 20:16

## 2017-12-28 RX ADMIN — NAFCILLIN SODIUM 2 G: 2 INJECTION, POWDER, LYOPHILIZED, FOR SOLUTION INTRAMUSCULAR; INTRAVENOUS at 01:56

## 2017-12-28 RX ADMIN — HEPARIN SODIUM 5000 UNITS: 5000 INJECTION, SOLUTION INTRAVENOUS; SUBCUTANEOUS at 10:42

## 2017-12-28 RX ADMIN — HEPARIN SODIUM 5000 UNITS: 5000 INJECTION, SOLUTION INTRAVENOUS; SUBCUTANEOUS at 01:56

## 2017-12-28 RX ADMIN — Medication 300 MG: at 20:19

## 2017-12-28 RX ADMIN — NAFCILLIN SODIUM 2 G: 2 INJECTION, POWDER, LYOPHILIZED, FOR SOLUTION INTRAMUSCULAR; INTRAVENOUS at 08:23

## 2017-12-28 RX ADMIN — NAFCILLIN SODIUM 2 G: 2 INJECTION, POWDER, LYOPHILIZED, FOR SOLUTION INTRAMUSCULAR; INTRAVENOUS at 12:58

## 2017-12-28 RX ADMIN — OLANZAPINE 5 MG: 5 TABLET, FILM COATED ORAL at 08:38

## 2017-12-28 RX ADMIN — OLANZAPINE 5 MG: 5 TABLET, FILM COATED ORAL at 00:39

## 2017-12-28 RX ADMIN — FAMOTIDINE 20 MG: 20 TABLET ORAL at 20:21

## 2017-12-28 RX ADMIN — BUMETANIDE 2 MG: 2 TABLET ORAL at 17:24

## 2017-12-28 RX ADMIN — BUMETANIDE 2 MG: 2 TABLET ORAL at 08:38

## 2017-12-28 RX ADMIN — Medication 1 PACKET: at 09:03

## 2017-12-28 RX ADMIN — ACETAMINOPHEN 650 MG: 160 SUSPENSION ORAL at 18:46

## 2017-12-28 RX ADMIN — KETOTIFEN FUMARATE 1 DROP: 0.25 SOLUTION/ DROPS OPHTHALMIC at 14:45

## 2017-12-28 RX ADMIN — CEFEPIME 2 G: 1 INJECTION, SOLUTION INTRAVENOUS at 21:40

## 2017-12-28 RX ADMIN — OLANZAPINE 5 MG: 5 TABLET, FILM COATED ORAL at 20:16

## 2017-12-28 RX ADMIN — ASPIRIN 81 MG CHEWABLE TABLET 81 MG: 81 TABLET CHEWABLE at 08:37

## 2017-12-28 RX ADMIN — NAFCILLIN SODIUM 2 G: 2 INJECTION, POWDER, LYOPHILIZED, FOR SOLUTION INTRAMUSCULAR; INTRAVENOUS at 21:38

## 2017-12-28 RX ADMIN — CARVEDILOL 3.12 MG: 3.12 TABLET, FILM COATED ORAL at 17:24

## 2017-12-28 RX ADMIN — CEFEPIME 2 G: 1 INJECTION, SOLUTION INTRAVENOUS at 04:48

## 2017-12-28 RX ADMIN — KETOTIFEN FUMARATE 1 DROP: 0.25 SOLUTION/ DROPS OPHTHALMIC at 09:09

## 2017-12-28 RX ADMIN — LORAZEPAM 0.5 MG: 0.5 TABLET ORAL at 20:17

## 2017-12-28 RX ADMIN — NAFCILLIN SODIUM 2 G: 2 INJECTION, POWDER, LYOPHILIZED, FOR SOLUTION INTRAMUSCULAR; INTRAVENOUS at 17:18

## 2017-12-28 RX ADMIN — NAFCILLIN SODIUM 2 G: 2 INJECTION, POWDER, LYOPHILIZED, FOR SOLUTION INTRAMUSCULAR; INTRAVENOUS at 04:55

## 2017-12-28 RX ADMIN — ACETYLCYSTEINE 4 ML: 100 INHALANT RESPIRATORY (INHALATION) at 09:20

## 2017-12-28 RX ADMIN — LOSARTAN POTASSIUM 25 MG: 25 TABLET ORAL at 08:38

## 2017-12-28 RX ADMIN — KETOTIFEN FUMARATE 1 DROP: 0.25 SOLUTION/ DROPS OPHTHALMIC at 20:19

## 2017-12-28 RX ADMIN — HEPARIN SODIUM 5000 UNITS: 5000 INJECTION, SOLUTION INTRAVENOUS; SUBCUTANEOUS at 17:24

## 2017-12-28 RX ADMIN — LORAZEPAM 0.5 MG: 0.5 TABLET ORAL at 15:17

## 2017-12-28 RX ADMIN — ACETAMINOPHEN 650 MG: 325 TABLET, FILM COATED ORAL at 00:39

## 2017-12-28 RX ADMIN — MULTIVITAMIN 15 ML: LIQUID ORAL at 09:02

## 2017-12-28 RX ADMIN — CEFEPIME 2 G: 1 INJECTION, SOLUTION INTRAVENOUS at 13:16

## 2017-12-28 RX ADMIN — Medication 300 MG: at 09:02

## 2017-12-28 NOTE — PLAN OF CARE
Problem: Patient Care Overview  Goal: Plan of Care/Patient Progress Review  /90 (BP Location: Left arm)  Pulse 105  Temp 98.4  F (36.9  C) (Oral)  Resp 18  Wt 81.7 kg (180 lb 1.6 oz)  SpO2 96%  BMI 27.38 kg/m2  Pt alert, arouses to voice.  Disoriented to time, place and situation.  Disoriented to birthdate.  Does not always answer questions appropriately.  Bed alarm on for safety.  HR sinus tachycardia.  Monitor tech notified this writer that pt had 9 beat atrial tach at 1905.  Text paged surgery crosscover with information.  Did not receive response.  Pt's left eye sclera red and draining.  Left facial droop with right sided hemiparesis.  Does not endorse pain.  Potassium replaced this shift.  Phosphorus lab draw requested.  Condom catheter replaced around 1600.  Is still in place and patent.  Pt voiding spontaneously.  No BM this shift.  Tolerating FLD.  Needs assistance with ordering/feeds.  Peg tube with one can bolus feeding done at 1600.  Tolerated well.  No nausea.  Triple lumen PICC with intermittent IV abx.  PCDs on.  Mechanical lift used to transfer pt to chair for dinner.  Cont with POC.

## 2017-12-28 NOTE — PLAN OF CARE
Problem: Patient Care Overview  Goal: Plan of Care/Patient Progress Review    Discharge Planner SLP   Patient plan for discharge: TCU  Current status: Pt seen for dysphagia tx briefly. RN reports pt became agitated this afternoon, however, pt appropriately participated in limited session. Recommend advance to dysphagia diet level 1 and thin liquids. Pt should sit upright/alert, place food on R side of mouth, take small bites/sips and frequently check for pocketing given reduced awareness.   Barriers to return to prior living situation: cognition, safety concerns, medical stability  Recommendations for discharge: TCU, ongoing SLP if baseline not achieved prior to d/c  Rationale for recommendations: dysphagia, aspiration risk given current mental status       Entered by: Daniela Lehman 12/28/2017 5:00 PM

## 2017-12-28 NOTE — PLAN OF CARE
"Problem: Patient Care Overview  Goal: Plan of Care/Patient Progress Review  Outcome: No Change  Pt s/p sternotomy aortic root + valve homograft 12/19. Difficult to assess pt orientation status; pt was able to state name for writer and was able to reply \"yes\" when asked about his birthdate. Pt disoriented to place. Pt appeared very flat today; pt was taking quite some time to answer questions when asked by writer, and sometimes responses were not appropriate to the question (CVTS aware). This may be contributed to the extra dose of Zyprexa he was given (both scheduled + PRN) per CVTS. PRN Zyprexa now d/c and PRN Ativan now available if needed. VSS. NSR/ST. RA. Denies pain. No movement on R side; L sided facial droop (not new). One can TF bolus given via G tube ~1300. Adequate UOP. Incontinent of stool x2. 3 PICC for intermittent abx. Up in chair x1. Assisted with repositioning in bed. Continue with plan of care and report changes to CVTS.      "

## 2017-12-28 NOTE — PLAN OF CARE
Problem: Patient Care Overview  Goal: Plan of Care/Patient Progress Review  Discharge Planner OT   Patient plan for discharge: TCU   Current status: Pt alert, following 25% of commands. Pt max A for bed mobility and dependently lifted to chair. Pt engaged in LUE AAROM and RUE PROM to maintain strength/ROM for ADLs. Pt total A for BADLs.   Barriers to return to prior living situation: residual R hemiparesis, cognitive deficits, deconditioning   Recommendations for discharge: TCU   Rationale for recommendations: Pt would benefit from continued OT/PT to progress independence with ADLs.        Entered by: Alka Romero 12/28/2017 11:05 AM

## 2017-12-28 NOTE — PROGRESS NOTES
"Pt became very agitated/impulsive ~1500, continuously yelling/asking for \"Meghna\" (pt partner). Pt partner was called/updated by writer. Pt was given PRN Ativan via G tube. Bed alarm on for safety.   "

## 2017-12-28 NOTE — PROGRESS NOTES
"Social Work Services Progress Note    Hospital Day: 12  Date of Initial Social Work Evaluation:  12/26/17  Collaborated with: VERONICA Newman    Data:  Pt is a 62 year old male being followed by SW for placement to rehab.    Intervention:  SW met with pt and SO to gather referrals for placement to rehab.  Per SO she is preferring:     [facility]  PH: [phone]  F: [phone] / [phone]    SW called  [facility] who state they are full but will review pt's information as pt will likely need prior auth from insurance before admission.    Addendum: Pt declined from [facility] due to lack of bed availability in their cognitive impairment unit.  Admissions stating the pt meets criteria for this placement due to his ongoing intermittent delirium following his redo sternotomy with aortic root and valve homograft.  If pt does not continue to clear cognitively in the next day or two, [facility] could not re-review pt for their other TCU unit.  Updated CVTS team and will discuss with [facility] rehab options.    Assessment:  SO stating pt is \"not like himself\" today and has concerns that the pt was \"over medicated\" last night.  Pt somnolent in bed and reacts to prompting.    Plan:    Anticipated Disposition:  Facility:  TCU     Barriers to d/c plan:  Medical stability, chest tubes    Follow Up: SW to follow for discharge planning and placement.    ANTON Church, [facility]  6C Unit   Phone: [phone]  Pager: [phone]  Unit: [phone]          "

## 2017-12-28 NOTE — PROGRESS NOTES
[facility] ID SERVICE: ONGOING CONSULTATION   Cricket Miguel : 1955 Sex: male:   Medical record number [id number] Attending Physician: Tacho Muñoz MD  Date of Service: 2017    PROBLEM LIST:   - worsening aortic insufficiency with presumed moises-graft leak in the setting of a history of periaortic abscess s/p redo aortic root valve homograph    - MSSA bacteremia 2016, treated with 2 weeks of IV cefazolin. TTE negative.  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 With negative intraoperative cultures to date, mechanical issue involving the valve most likely caused the dehiscence and not an ongoing infection.  He developed a fever morning of , and was found to have pseudomonas pneumonia which can be treated with PO ciprofloxacin. However given prolonged QT, he is currently on Cefepime. At this time, would prefer to change back to the preferred MSSA coverage, nafcillin. Anticipate a total 2 week course for the pseudomonas pneumonia. His original anticipated end date for the MSSA prosthetic valve endocarditis treatment with nafcillin and rifampin was 18.    RECOMMENDATIONS:   - Continue nafcillin 2g IV q4h and Rifampin 300mg po bid, previously planned duration until 18  -continue cefepime for 2 total weeks duration for pseudomonas pneumonia (end date 1/3/18)    ID will sign off. Please page if new questions arise.     Attestation:  I have reviewed today's vital signs,  medications, labs and imaging.  Floor time: 25 minutes, Face-to-face time: 10 minutes, Total time: 35 minutes      Cynthia Voss MD.  ID Staff  [id number]           CHIEF INFECTIOUS DISEASES COMPLAINT:  History of MSSA prosthetic valve endocarditis, now with pseudoaneurysm and moises-graft leak, s/p surgical repair. Operative cultures negative to date.     INTERVAL HISTORY:   Patient now on 6B out of the ICU. He is resting comfortably in bed without acute c/o. His primary team restarted nafcillin plus rifampin due to concern over rising WBC count on cefepime alone for Pseudomonas in the sputum. CV surgery advises not given cipro due to already prolonged QT interval and other drug interactions with pysch meds: olanzipine stared for agitation.     ROS: A five-point review of systems was obtained and was negative with the exception of that which is described above.  Allergies   Allergen Reactions     Lisinopril Swelling     One-sided facial swelling after being on lisinopril/HCTZ for one week.      Allergies were reviewed.    No current outpatient prescriptions on file.       acetylcysteine  4 mL Nebulization TID     albuterol  2.5 mg Nebulization TID     protein modular  1 packet Per Feeding Tube TID     famotidine  20 mg Oral BID     OLANZapine  5 mg Oral BID     nafcillin  2 g Intravenous Q4H     ceFEPIme (MAXIPIME) IV  2 g Intravenous Q8H     bumetanide  2 mg Oral BID     losartan  25 mg Oral Q12H KILO     polyethylene glycol  17 g Oral Daily     heparin  5,000 Units Subcutaneous Q8H     multivitamins with minerals  15 mL Per Feeding Tube Daily     sodium chloride (PF)  3 mL Intracatheter Q8H     senna-docusate  1-2 tablet Oral BID     mirtazapine  30 mg Oral At Bedtime     rifampin  300 mg Oral or Feeding Tube BID     ketotifen  1 drop Left Eye TID     artificial tears   Ophthalmic At Bedtime     aspirin  81 mg Oral or Feeding Tube Daily     atorvastatin  40 mg Oral Daily         EXAMINATION:   /71 (BP  Location: Left arm)  Pulse 105  Temp 98.5  F (36.9  C) (Oral)  Resp 18  Wt 81.7 kg (180 lb 1.6 oz)  SpO2 97%  BMI 27.38 kg/m2  GENERAL:  No distress. Sleepy but attentive  EYES:  Eyes have anicteric sclerae without conjunctival injection.  ENT:  Scar from prior trach  NECK:  Supple. No cervical lymphadenopathy.  LUNGS:  Coarse bilaterally on anterior exam.   CARDIOVASCULAR:  Regular rate and rhythm with no murmurs, gallops or rubs. 1 chest tube remains  ABDOMEN:  Normal bowel sounds, soft, nontender. PEG c/d/i  SKIN:  No acute rashes. Line(s) are in place without any surrounding erythema or exudate.       NEW DATA/RESULTS:   All interval basic labs, microbiology results and imaging were reviewed.    Culture Micro   Date Value Ref Range Status   12/25/2017 No growth after 3 days  Preliminary   12/25/2017 No growth after 3 days  Preliminary   12/21/2017 Light growth  Pseudomonas aeruginosa   (A)  Final   12/21/2017 Susceptibility testing done on previous specimen  Final   12/20/2017 Heavy growth  Pseudomonas aeruginosa   (A)  Final   12/20/2017 (A)  Final    Light growth  Candida albicans / dubliniensis  Candida albicans and Candida dubliniensis are not routinely speciated  Susceptibility testing not routinely done     12/20/2017   Final    Canceled, Test credited  Incorrectly ordered by PCU/Clinic  see ACTIN CULTURE instead     12/20/2017   Final    Notification of test cancellation was given to  Noah Hood RN at 1448 12.20.17. hd     12/20/2017 Culture negative monitoring continues  Preliminary       Recent Labs   Lab Test  12/25/17   1655  12/20/17   0748  12/17/17   0730  10/23/17   0341  10/21/17   0335  10/16/17   0323   CRP  90.0*  87.0*  65.0*  30.0*  44.0*  48.0*     Recent Labs   Lab Test  12/28/17   0727  12/27/17   0430  12/26/17   0623  12/25/17   0809  12/23/17   0420  12/21/17   0154   WBC  14.0*  14.0*  15.8*  14.1*  11.1*  13.4*     Recent Labs   Lab Test  12/28/17   0727  12/27/17   0430   12/26/17   0623  12/25/17   0951   CR  0.84  0.74  0.77  0.80   GFRESTIMATED  >90  >90  >90  >90       Hematology Studies  Recent Labs   Lab Test  12/28/17   0727  12/27/17   0430  12/26/17   0623  12/25/17   0809  12/23/17   0420   12/21/17   0154   12/17/17   0019  12/16/17   1935   10/06/17   1020   05/06/13   1703  12/04/12   1016  02/02/11   1506   WBC  14.0*  14.0*  15.8*  14.1*  11.1*   --   13.4*   < >  11.2*  9.8   < >  17.1*   < >  10.6  11.9*  11.9*   ANEU   --    --    --    --    --    --    --    --   7.8  6.6   --   15.7*   --   7.2  7.9  8.2   AEOS   --    --    --    --    --    --    --    --   1.1*  1.0*   --   0.0   --   0.0  0.4  0.3   HCT  30.1*  27.7*  28.1*  28.1*  25.5*   --   25.0*   < >  34.0*  31.5*   < >  43.8   < >  46.2  47.4  46.8   PLT  477*  410  370  364  270   < >  250   < >  420  391   < >  185   < >  315  259  315    < > = values in this interval not displayed.       Metabolic  Recent Labs   Lab Test  12/28/17   0727  12/27/17   0430  12/26/17   0623   NA  139  141  141   BUN  38*  41*  35*   CO2  26  28  26   CR  0.84  0.74  0.77   GFRESTIMATED  >90  >90  >90       Hepatic Studies  Recent Labs   Lab Test  12/18/17   0359  12/17/17 2024 12/17/17   0019   BILITOTAL  1.3  1.0  1.3   ALKPHOS  92  94  102   ALBUMIN  1.5*  1.6*  1.6*   AST  32  29  33   ALT  30  32  34       Immunologlobulins  Recent Labs   Lab Test  10/23/17   0341  10/21/17   0357  05/06/13   1703   SED  122*  108*  6

## 2017-12-28 NOTE — PROGRESS NOTES
[facility]  CARDIOLOGY DAILY PROGRESS NOTE    Interval History:   Disoriented to time place situation and birthday  Transferred to different room so chair lift can be used  Condom cath on  Likely dc to TCU this weekend    Objective  Temp:  [98  F (36.7  C)-98.8  F (37.1  C)] 98.5  F (36.9  C)  Pulse:  [105] 105  Heart Rate:  [102-110] 109  Resp:  [16-20] 18  BP: ()/(66-99) 133/99  SpO2:  [94 %-97 %] 94 %   Room air     I/O last 3 completed shifts:  In: 1199 [P.O.:120; I.V.:360; NG/GT:300]  Out: 1775 [Urine:1775]     Wt Readings from Last 5 Encounters:   12/26/17 81.7 kg (180 lb 1.6 oz)   12/15/17 83 kg (183 lb)   11/02/17 88.3 kg (194 lb 10.7 oz)   02/06/17 91.6 kg (202 lb)   01/09/17 87.5 kg (193 lb)     GEN: no acute distress  HEENT: no icterus  CV: RRR, normal s1/s2, no murmurs/rubs/s3/s4, no heave. JVP at 8 cm.   CHEST: on room air, normal work of breathing, clear to ausculation bilaterally, no rales or wheezing  ABD: soft, non-tender, normal active bowel sounds  EXTR: pulses 2+ and equal. No clubbing, cyanosis.  Trace lower extremity edema.   NEURO: slight dysarthria, left facial droop, disoriented occasionally    Current Medications    acetylcysteine  4 mL Nebulization TID     albuterol  2.5 mg Nebulization TID     protein modular  1 packet Per Feeding Tube TID     famotidine  20 mg Oral BID     OLANZapine  5 mg Oral BID     nafcillin  2 g Intravenous Q4H     ceFEPIme (MAXIPIME) IV  2 g Intravenous Q8H     bumetanide  2 mg Oral BID     losartan  25 mg Oral Q12H KILO     polyethylene glycol  17 g Oral Daily     heparin  5,000 Units Subcutaneous Q8H     multivitamins with minerals  15 mL Per Feeding Tube Daily     sodium chloride (PF)  3 mL Intracatheter Q8H     senna-docusate  1-2 tablet Oral BID     mirtazapine  30 mg Oral At Bedtime     rifampin  300 mg Oral or Feeding Tube BID     ketotifen  1 drop Left Eye TID     artificial tears   Ophthalmic At Bedtime     aspirin  81 mg  Oral or Feeding Tube Daily     atorvastatin  40 mg Oral Daily       IV fluid REPLACEMENT ONLY       IV fluid REPLACEMENT ONLY       Reason beta blocker order not selected       - MEDICATION INSTRUCTIONS -       Lab Values    Recent Labs  Lab 12/28/17  0727 12/27/17  0430 12/26/17  0623 12/25/17  0951    141 141 140   POTASSIUM 4.2 3.8 4.0 4.0   CHLORIDE 104 106 107 106   IZABELLA 9.5 8.6 8.9 8.9   CO2 26 28 26 26   BUN 38* 41* 35* 29   CR 0.84 0.74 0.77 0.80   * 159* 128* 146*       Recent Labs  Lab 12/28/17  0727 12/27/17  0430 12/26/17  0623 12/25/17  0809   WBC 14.0* 14.0* 15.8* 14.1*   RBC 2.96* 2.77* 2.80* 2.83*   HGB 9.0* 8.4* 8.5* 8.6*   HCT 30.1* 27.7* 28.1* 28.1*   * 100 100 99   MCH 30.4 30.3 30.4 30.4   MCHC 29.9* 30.3* 30.2* 30.6*   RDW 18.7* 18.3* 18.0* 18.2*   * 410 370 364     Studies  EKG: low voltage, sinus rhythm     Transthoracic echocardiogram:   Left ventricular wall thickness is normal. Mild left ventricular dilation is present. The Ejection Fraction is estimated at 30-35%. Diastolic function not assessed due to tachycardia. Severe diffuse hypokinesis is present.     The right ventricle is normal size. Global right ventricular function is mildly reduced.  Both atria appear normal.  Trace mitral insufficiency is present.  The tricuspid valve is normal.     Vessels  The patient is post bio-Bentall - aortic root replacement with a 30 mm graft and AVR with a Trifecta valve. Today, there is circumferential free space noted surrounding the aortic graft, dehiscence and rocking of the graft from the native aorta and severe AI in the space surrounding the aortic graft (perivalvular/perigraft). The circumferential free space was present on the  previous studies of October 2017 but was filled with echodense material and the graft was not independently mobile, and there was no significant AI. The prosthetic aortic valve leaflets are not well visualized today.     No pericardial  effusion is present.     Compared to Previous Study  This study was compared with the study from 10/06/2017 . Since the prior study, the LV function has worsened and the aortic graft appears to be dehisced, as described above.    CXR  Impression:   1. Small left pleural effusion and associated retrocardiac subsegmental atelectasis and/or consolidation.  2. Stable postsurgical changes and supporting devices.   3. Stable pulmonary vascular congestion.      Assessment and Recommendation:  Cricket Miguel is a 62 year old male admitted with worsening dyspnea, thought to have acute severe AI on echocardiogram.  Pertinent history of aortic stenosis and ascending aortic aneurysm s/p AVR and root replacement April 2016.  Had complications of moises aortic abscess, endocarditis, and septic emboli to his brain.  He underwent redo sternotomy with aortic root and valve homograft 12/19.  Heart failure service is being consulted for newly reduced EF 20-25%.  Most likely related to  severe AI and recent surgery, and should improve over time.  Does not appear ischemic.     On losartan 25mg BID (switched from isordil/hydral combo since his renal function has improved back to normal).  He has lisinopril allergy documented of left sided facial swelling, doubt that is actual angioedema with no concerns after more than 36 hours.     Ordered coreg 3.125 mg BID to start tonight.    On bumex 2mg PO BID.  Nafcillin typically has a higher sodium load.      I have discussed the above with Dr. Browne.      Thank you for consulting the cardiovascular services at the [facility]. Please do not hesitate to call us with any questions.      Pardeep Cruz MD PGY4  Cardiology Fellow  [phone]        I have reviewed today's vital signs, notes, medications, labs and imaging. I have also seen and examined the patient and agree with the findings and plan as outlined above.    Shakila Browne MD  Section Head - Advanced  Heart Failure, Transplantation and Mechanical Circulatory Support  Co-Director - [facility]  Associate Professor of Medicine, [facility]

## 2017-12-28 NOTE — PROGRESS NOTES
CVTS Progress Note  Attending provider: Pili Bowers,*      SUBJECTIVE:    No acute issues over night. Some increased anxiety  No acute complaints, reports feels good today, pain well controlled.  Patient denies SOB, CP, fever, chills, sweats.     Patient has been tolerating diet. + BM. + flatus.  NSR to sinus tach.       OBJECTIVE:   Temp:  [98  F (36.7  C)-98.8  F (37.1  C)] 98.5  F (36.9  C)  Pulse:  [105] 105  Heart Rate:  [102-110] 109  Resp:  [16-20] 18  BP: ()/(66-99) 133/99  SpO2:  [94 %-97 %] 94 %    Gen: NAD, more alert today  CV: RRR, normal S1, S2, no murmurs, rubs or gallops   Pulm: Fine crackles in bases, otherwise CTA, No wheezing or rhonchi  Abd: Soft, NT, ND, +BS  Ext: Trace LE edema  Neuro: Nonfocal  Incision: c/d/i, no erythema, sternum stable  Tubes/drains: Dressing clean and dry.    I&O's:  I/O last 3 completed shifts:  In: 1199 [P.O.:120; I.V.:360; NG/GT:300]  Out: 1775 [Urine:1775]    Labs:   BMP    Recent Labs  Lab 12/28/17  0727 12/27/17  0430 12/26/17  0623 12/25/17  0951    141 141 140   POTASSIUM 4.2 3.8 4.0 4.0   CHLORIDE 104 106 107 106   IZABELLA 9.5 8.6 8.9 8.9   CO2 26 28 26 26   BUN 38* 41* 35* 29   CR 0.84 0.74 0.77 0.80   * 159* 128* 146*     CBC    Recent Labs  Lab 12/28/17  0727 12/27/17  0430 12/26/17  0623 12/25/17  0809   WBC 14.0* 14.0* 15.8* 14.1*   RBC 2.96* 2.77* 2.80* 2.83*   HGB 9.0* 8.4* 8.5* 8.6*   HCT 30.1* 27.7* 28.1* 28.1*   * 100 100 99   MCH 30.4 30.3 30.4 30.4   MCHC 29.9* 30.3* 30.2* 30.6*   RDW 18.7* 18.3* 18.0* 18.2*   * 410 370 364     INR  No lab results found in last 7 days.   Hepatic Panel No lab results found in last 7 days.  Glucose  Recent Labs  Lab 12/28/17  0727 12/27/17  0846 12/27/17  0430 12/26/17  1730 12/26/17  1142 12/26/17  0851 12/26/17  0623 12/26/17  0141 12/25/17  1915  12/25/17  0951  12/24/17  0427  12/23/17  0420   *  --  159*  --   --   --  128*  --   --   --  146*  --  142*  --  121*   BGM   --  153*  --  115* 147* 143*  --  119* 134*  < >  --   < >  --   < >  --    < > = values in this interval not displayed.    Imaging:   CXR 12/27:  Reviewed, no appreciable pneumo, rotation of film noted, official read pending      ASSESSMENT: Cricket Miguel is a 62 year old male with hx of COPD, HTN, HLD, CAD, heart failure with preserved EF, aortic stenosis and ascending aortic aneurysm s/p aortic valve replacement and aortic root replacement (April 2016) c/b moises-aortic abscess, endocarditis, cerebral septic emboli and severe AI s/p redo-sternotomy aortic root and valve homograft 12/19.       PLAN:   Neuro/ pain/ sedation: Hx of multifocal acute infarctions involving the left parietotemporal lobes, left cerebral hemisphere and right occipital lobe presumed to be septic emboli. Depression, mild delirium, followed by psych.  -Monitor neurological status. Notify the MD for any acute changes in exam.  -Dilaudid PRN.  -Mental status improving.  - On psych rec started olanzapine. Agitation improving. Serial EKGs to evaluate QT interval. QTc today 479. Psych recommended decreasing zyprexa given increased flat affect and delayed responses, however much more anxious this 12/27 so dose kept at 5 mg BID. Received prn dose overnight, now more flat, will remove prn dose and have prn ativan available.     Pulmonary care: extubated 12/20  - On facemask 2 LPM saturation 100%.  - Encourage pulmonary toilet  - Nasotracheal suctioning  - Removed right chest tube 12/26   - Left pleural tube clamping trial okay, removed 12/27      Cardiovascular:  HTN, HLD, CAD, AS and aortic aneurysm s/p bentall c/b endocarditis and severe AI s/p aortic root/valve homograft 12/19.  -Keep SBP<120  -Isordil/hydralazine started per cardiology team for elevated MAPs, improving  - ASA 81mg and atorvastatin.  - Echo shows EF 20-25% baseline 30%   - No bradycardia or pauses, has been in NSR for past 3 days, TPW removed 12/26     GI care:  PEG  -famotidine  -senna/colace/miralax, +BM, cdiff negative 12/26      Fluids/ Electrolytes/ Nutrition:   -PRN electrolyte replacement  -Bolus tube feeds through PEG, dajuan counts     Renal/ Fluid Balance: Urine output is adequate so far.  -Garza catheter for strict intake and output.  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 Hx of MSSA septic arthritis and bacteremia. Hx of multifocal acute infarctions involving the left parietotemporal lobes, left cerebral hemisphere and right occipital lobe presumed to be septic emboli. ID following.   -New cultures BAL growing pseudomonas aeruginosa. ID following. Continue nafcillin/cefepime/rifampin per ID. WBC 14->14  -F/u surgical tissue cultures      Heme: acute blood loss anemia  - Hgb stable.      Prophylaxis:    -Mechanical prophylaxis for DVT.   -Heparin TID.       Lines/ tubes/ drains:  -R triple lumen PICC, garza, PIVs      Disposition:  -Floor since 12/23   -Recommending discharge to TCU, d/c next 1-2 days    Staff surgeons have been informed of changes through both verbal and written communication.         Benny Unger PA-C  Cardiothoracic Surgery  December 28, 2017 9:10 AM   p: [phone]

## 2017-12-28 NOTE — CONSULTS
"Otolaryngology Consult Note  December 28, 2017      CC: Nasal asymmetry     HPI: Cricket Miguel is a 62 year old male with a past medical history of Psoriasis, MDD, HTN, HLD, prior CVA with hemiparesis, COPD, CHF, cardiomyopathy, Aortic regurgitation s/p dilatation, AI and prior aortic root abscess. I was asked to evaluate his nasal deviation by Dr. Benny Unger of the cardiothoracic team. Per the patient's wife, who was at bedside providing the history, the patient's nose appears \"a little crooked, with a bump\". She first noticed the asymetry following treatment for CVA and heart related problems in October. She thinks the asymmetry is stable. The area hasn't bled, has never appeared red or crusty, hasn't poped or drained fluid, no facial lesions, no nasal drainage. She is concerned that it could be causing nasal obstruction or other problems and would like the nose evaluated while the patient is already inpatient and being worked up for other medical concerns. Patient cannot communicate during the discussion due to medication and current state of health.    Past Medical History:   Diagnosis Date     Abscess of aortic root 09/2017     AI (aortic insufficiency)     severe     Anxiety      Aortic root dilatation (H)      AR (aortic regurgitation)     severe     Cardiomyopathy (H)      CHF (congestive heart failure), NYHA class II (H)      COPD, mild (H)     spirometry 12/16     CVA (cerebral vascular accident) (H) 09/2017    septic emboli - MSSA - cerebellum, Left MCA, Rt Occipital, Aphasia, Left visual field cut, moderate to severe encephalopathy     Depression 09/2017     Heart murmur      Hyperlipidemia LDL goal <70 10/31/2010     Hypertension goal BP (blood pressure) < 140/90      Inguinal hernia      left lung nodule  1/29/2008     Major depressive disorder, single episode, moderate (H)      Psoriasis childhood     Tobacco use disorder        Past Surgical History:   Procedure Laterality Date     ARTHROSCOPY " KNEE INCISION AND DRAINAGE Left 10/8/2017    Procedure: ARTHROSCOPY KNEE INCISION AND DRAINAGE;  Arthroscopic Incision and Drainage of Left Knee;  Surgeon: Lucretia Munoz MD;  Location: [facility] OR     HC SHOULDER ARTHROSCOPY, DX  2001    Arthroscopy, Shoulder RT Dr OSIRIS Andersen     HC SHOULDER ARTHROSCOPY, DX  1/04    LT arthroscopy Dr OSIRIS Andersen     HERNIA REPAIR, UMBILICAL  1/08    incarcerated - dr rockwell     HERNIORRHAPHY INGUINAL  12/21/2012    HERNIORRHAPHY INGUINAL;  Right Inguinal Hernia Repair with mesh ;  Surgeon: Mello Rockwell MD;  Location: [facility] OR     REPAIR ANEURYSM ASCENDING AORTA N/A 12/19/2017    Procedure: REPAIR ANEURYSM ASCENDING AORTA;  REDO Median STERNOTOMY, FEMORAL CANNULATION, Lysis of adhesions, AORTIC ROOT VALVE HOMOGRAFT with 26mm x 7.0cm Cryolife Aortic valve and conduit;  Surgeon: Pili Bowers MD;  Location: [facility] OR     REPLACE VALVE AORTIC N/A 5/17/2016    REPLACE VALVE AORTIC - Dr Bowers     ROTATOR CUFF REPAIR RT/LT  11/10    Rt arthroscopy - Dr OSIRIS Andersen     SURGICAL HISTORY OF -   5/16    AVR, severe AR, aortic root repair     TRACHEOSTOMY PERCUTANEOUS  10/27/2017            No current outpatient prescriptions on file.          Allergies   Allergen Reactions     Lisinopril Swelling     One-sided facial swelling after being on lisinopril/HCTZ for one week.        Social History     Social History     Marital status:      Spouse name: N/A     Number of children: 3     Years of education: 12     Occupational History     Not on file.     Social History Main Topics     Smoking status: Former Smoker     Packs/day: 1.00     Years: 35.00     Quit date: 4/1/2016     Smokeless tobacco: Never Used      Comment: 4/2016     Alcohol use 0.0 oz/week     0 Standard drinks or equivalent per week      Comment: 1 drink per week avg, seldom     Drug use: Yes      Comment: marijuana- daily     Sexual activity: Yes     Partners: Female     Other Topics Concern     Caffeine Concern  Yes     3 cups     Sleep Concern No     Special Diet No     trying to watch salt     Exercise Yes     walking     Seat Belt No     Parent/Sibling W/ Cabg, Mi Or Angioplasty Before 65f 55m? No     Social History Narrative       Family History   Problem Relation Age of Onset     Genetic Disorder Mother      Bone plateover growth Agustin. shoulder surgeries     CANCER Mother      brain cancer     Alzheimer Disease Father        ROS: 12 point review of systems is negative unless noted in HPI.    PHYSICAL EXAM:  General: laying in bedside chair, no acute distress  /71 (BP Location: Left arm)  Pulse 105  Temp 98.5  F (36.9  C) (Oral)  Resp 18  Wt 81.7 kg (180 lb 1.6 oz)  SpO2 97%  BMI 27.38 kg/m2  HEAD: normocephalic, atraumatic  Face:sigficicant asymmetry with left sided facial droop, wasted muscled of facial expression on the lower left face causing a tissue laxity an droop, although the patient cannot follow commands for me he appears to be a HB 6/6 on the left. no swelling, edema, or erythema. Nasal vestibule appears patent, with a midline septum and moderate anterior crusting, he has a prominent L lower lateral cartilage with inversion of the natural crural fold, bilaterally he has large lower lateral cartilages. No fluid collection, no concern for nasal obstruction or infection  Neck: no LAD, trachea midline    ROUTINE IP LABS (Last four results)  BMP  Recent Labs  Lab 12/28/17  0727 12/27/17  0430 12/26/17  0623 12/25/17  0951    141 141 140   POTASSIUM 4.2 3.8 4.0 4.0   CHLORIDE 104 106 107 106   IZABELLA 9.5 8.6 8.9 8.9   CO2 26 28 26 26   BUN 38* 41* 35* 29   CR 0.84 0.74 0.77 0.80   * 159* 128* 146*     CBC  Recent Labs  Lab 12/28/17  0727 12/27/17  0430 12/26/17  0623 12/25/17  0809   WBC 14.0* 14.0* 15.8* 14.1*   RBC 2.96* 2.77* 2.80* 2.83*   HGB 9.0* 8.4* 8.5* 8.6*   HCT 30.1* 27.7* 28.1* 28.1*   * 100 100 99   MCH 30.4 30.3 30.4 30.4   MCHC 29.9* 30.3* 30.2* 30.6*   RDW 18.7* 18.3*  18.0* 18.2*   * 410 370 364     INRNo lab results found in last 7 days.    Assessment and Plan  Cricket Miguel is a 62 year old male with an asymmetric nose. Although the patient's wife could not provide a picture of the patient's nose prior to the recent medical cares, this is likely a long standing asymmetry. I believe it to be unrelated to his ongoing medical cares. He hasn't had recent nasal trauma or nasal surgery. With the recent CVA and facial paralysis, I suspect that the nasal asymmetry has become more obvious and prominent to the patient's wife. There is no concern for nasal obstruction. Nasal saline may help with crusting which is present on anterior rhinoscopy exam and could help keep the tissues lubricated, moist and free of excessive bleeding.     Brent Cuevas MD PGY - 1  Otolaryngology-Head & Neck Surgery  Please page ENT with questions by dialing [phone] and entering job code [phone] when prompted.    Case was discussed with chief resident Caroline and Dr Tyler.        ADDENDUM:  I have not seen Mr. Miguel, but talked to Dr Cuevas and agree with the plan.  I suggest f/u with Dr. Kelly Gardner if the isues with facial paralysis persist and are a priority for the patient and his family.    Evert Tyler MD  Otolaryngology/Head & Neck Surgery  [phone]

## 2017-12-28 NOTE — PLAN OF CARE
Problem: Patient Care Overview  Goal: Plan of Care/Patient Progress Review  Outcome: Improving  D/A/I. Pt alert, disoriented to time/situation. Left facial droop, right arm and leg hemiparesis. Tele is sinus tach. VSS on RA. PEG tube capped overnight. Incontinent of urine and stool, condom cath leola overnight. Zyprexa given for restlessness. Tylenol given for pain. Chest tube removed yesterday.  P. Plan to transfer to TCU when ready. Family updated at bedside this AM. Nataliya Pantoja

## 2017-12-28 NOTE — TELEPHONE ENCOUNTER
Patient currently admitted to the hospital.    Merary Kiran, BS, RN, PHN  [facility]) [phone]

## 2017-12-29 ENCOUNTER — APPOINTMENT (OUTPATIENT)
Dept: SPEECH THERAPY | Facility: CLINIC | Age: 62
DRG: 219 | End: 2017-12-29
Attending: INTERNAL MEDICINE
Payer: COMMERCIAL

## 2017-12-29 ENCOUNTER — APPOINTMENT (OUTPATIENT)
Dept: OCCUPATIONAL THERAPY | Facility: CLINIC | Age: 62
DRG: 219 | End: 2017-12-29
Attending: INTERNAL MEDICINE
Payer: COMMERCIAL

## 2017-12-29 LAB
ANION GAP SERPL CALCULATED.3IONS-SCNC: 9 MMOL/L (ref 3–14)
BUN SERPL-MCNC: 33 MG/DL (ref 7–30)
CALCIUM SERPL-MCNC: 9.4 MG/DL (ref 8.5–10.1)
CHLORIDE SERPL-SCNC: 105 MMOL/L (ref 94–109)
CO2 SERPL-SCNC: 26 MMOL/L (ref 20–32)
CREAT SERPL-MCNC: 0.83 MG/DL (ref 0.66–1.25)
ERYTHROCYTE [DISTWIDTH] IN BLOOD BY AUTOMATED COUNT: 18.5 % (ref 10–15)
GFR SERPL CREATININE-BSD FRML MDRD: >90 ML/MIN/1.7M2
GLUCOSE BLDC GLUCOMTR-MCNC: 120 MG/DL (ref 70–99)
GLUCOSE BLDC GLUCOMTR-MCNC: 127 MG/DL (ref 70–99)
GLUCOSE BLDC GLUCOMTR-MCNC: 167 MG/DL (ref 70–99)
GLUCOSE SERPL-MCNC: 118 MG/DL (ref 70–99)
HCT VFR BLD AUTO: 31.3 % (ref 40–53)
HGB BLD-MCNC: 9.4 G/DL (ref 13.3–17.7)
LACTATE BLD-SCNC: 0.8 MMOL/L (ref 0.7–2)
MCH RBC QN AUTO: 30.5 PG (ref 26.5–33)
MCHC RBC AUTO-ENTMCNC: 30 G/DL (ref 31.5–36.5)
MCV RBC AUTO: 102 FL (ref 78–100)
PLATELET # BLD AUTO: 526 10E9/L (ref 150–450)
POTASSIUM SERPL-SCNC: 3.6 MMOL/L (ref 3.4–5.3)
RBC # BLD AUTO: 3.08 10E12/L (ref 4.4–5.9)
SODIUM SERPL-SCNC: 139 MMOL/L (ref 133–144)
WBC # BLD AUTO: 13.6 10E9/L (ref 4–11)

## 2017-12-29 PROCEDURE — 83605 ASSAY OF LACTIC ACID: CPT | Performed by: INTERNAL MEDICINE

## 2017-12-29 PROCEDURE — 36592 COLLECT BLOOD FROM PICC: CPT | Performed by: INTERNAL MEDICINE

## 2017-12-29 PROCEDURE — 85027 COMPLETE CBC AUTOMATED: CPT | Performed by: PHYSICIAN ASSISTANT

## 2017-12-29 PROCEDURE — 25000132 ZZH RX MED GY IP 250 OP 250 PS 637

## 2017-12-29 PROCEDURE — 25000132 ZZH RX MED GY IP 250 OP 250 PS 637: Performed by: INTERNAL MEDICINE

## 2017-12-29 PROCEDURE — 25000132 ZZH RX MED GY IP 250 OP 250 PS 637: Performed by: ANESTHESIOLOGY

## 2017-12-29 PROCEDURE — 40000225 ZZH STATISTIC SLP WARD VISIT

## 2017-12-29 PROCEDURE — 80048 BASIC METABOLIC PNL TOTAL CA: CPT | Performed by: PHYSICIAN ASSISTANT

## 2017-12-29 PROCEDURE — 27210913 ZZH NUTRITION PRODUCT INTERMEDIATE PACKET

## 2017-12-29 PROCEDURE — 92526 ORAL FUNCTION THERAPY: CPT | Mod: GN

## 2017-12-29 PROCEDURE — 40000275 ZZH STATISTIC RCP TIME EA 10 MIN

## 2017-12-29 PROCEDURE — 25000125 ZZHC RX 250: Performed by: PHYSICIAN ASSISTANT

## 2017-12-29 PROCEDURE — 25000132 ZZH RX MED GY IP 250 OP 250 PS 637: Performed by: HOSPITALIST

## 2017-12-29 PROCEDURE — 21400006 ZZH R&B CCU INTERMEDIATE UMMC

## 2017-12-29 PROCEDURE — 25000132 ZZH RX MED GY IP 250 OP 250 PS 637: Performed by: PHYSICIAN ASSISTANT

## 2017-12-29 PROCEDURE — 25000125 ZZHC RX 250: Performed by: INTERNAL MEDICINE

## 2017-12-29 PROCEDURE — 94640 AIRWAY INHALATION TREATMENT: CPT

## 2017-12-29 PROCEDURE — 25000132 ZZH RX MED GY IP 250 OP 250 PS 637: Performed by: SURGERY

## 2017-12-29 PROCEDURE — 25000132 ZZH RX MED GY IP 250 OP 250 PS 637: Performed by: THORACIC SURGERY (CARDIOTHORACIC VASCULAR SURGERY)

## 2017-12-29 PROCEDURE — 00000146 ZZHCL STATISTIC GLUCOSE BY METER IP

## 2017-12-29 PROCEDURE — 36592 COLLECT BLOOD FROM PICC: CPT | Performed by: PHYSICIAN ASSISTANT

## 2017-12-29 PROCEDURE — 40000802 ZZH SITE CHECK

## 2017-12-29 PROCEDURE — 25000128 H RX IP 250 OP 636: Performed by: PHYSICIAN ASSISTANT

## 2017-12-29 PROCEDURE — 94640 AIRWAY INHALATION TREATMENT: CPT | Mod: 76

## 2017-12-29 RX ORDER — BUMETANIDE 1 MG/1
1 TABLET ORAL
Status: DISCONTINUED | OUTPATIENT
Start: 2017-12-30 | End: 2018-01-04 | Stop reason: HOSPADM

## 2017-12-29 RX ORDER — POTASSIUM CHLORIDE 20MEQ/15ML
20 LIQUID (ML) ORAL 2 TIMES DAILY
Status: DISCONTINUED | OUTPATIENT
Start: 2017-12-30 | End: 2018-01-04 | Stop reason: HOSPADM

## 2017-12-29 RX ADMIN — HEPARIN SODIUM 5000 UNITS: 5000 INJECTION, SOLUTION INTRAVENOUS; SUBCUTANEOUS at 03:49

## 2017-12-29 RX ADMIN — POTASSIUM CHLORIDE 20 MEQ: 1.5 POWDER, FOR SOLUTION ORAL at 16:57

## 2017-12-29 RX ADMIN — CARVEDILOL 3.12 MG: 3.12 TABLET, FILM COATED ORAL at 21:14

## 2017-12-29 RX ADMIN — NAFCILLIN SODIUM 2 G: 2 INJECTION, POWDER, LYOPHILIZED, FOR SOLUTION INTRAMUSCULAR; INTRAVENOUS at 16:58

## 2017-12-29 RX ADMIN — NAFCILLIN SODIUM 2 G: 2 INJECTION, POWDER, LYOPHILIZED, FOR SOLUTION INTRAMUSCULAR; INTRAVENOUS at 00:12

## 2017-12-29 RX ADMIN — ALBUTEROL SULFATE 2.5 MG: 2.5 SOLUTION RESPIRATORY (INHALATION) at 08:17

## 2017-12-29 RX ADMIN — MIRTAZAPINE 30 MG: 15 TABLET, FILM COATED ORAL at 21:34

## 2017-12-29 RX ADMIN — KETOTIFEN FUMARATE 1 DROP: 0.25 SOLUTION/ DROPS OPHTHALMIC at 09:30

## 2017-12-29 RX ADMIN — NAFCILLIN SODIUM 2 G: 2 INJECTION, POWDER, LYOPHILIZED, FOR SOLUTION INTRAMUSCULAR; INTRAVENOUS at 13:14

## 2017-12-29 RX ADMIN — FAMOTIDINE 20 MG: 20 TABLET ORAL at 21:14

## 2017-12-29 RX ADMIN — NAFCILLIN SODIUM 2 G: 2 INJECTION, POWDER, LYOPHILIZED, FOR SOLUTION INTRAMUSCULAR; INTRAVENOUS at 09:24

## 2017-12-29 RX ADMIN — NAFCILLIN SODIUM 2 G: 2 INJECTION, POWDER, LYOPHILIZED, FOR SOLUTION INTRAMUSCULAR; INTRAVENOUS at 05:11

## 2017-12-29 RX ADMIN — BUMETANIDE 2 MG: 2 TABLET ORAL at 09:30

## 2017-12-29 RX ADMIN — KETOTIFEN FUMARATE 1 DROP: 0.25 SOLUTION/ DROPS OPHTHALMIC at 21:22

## 2017-12-29 RX ADMIN — CEFEPIME 2 G: 1 INJECTION, SOLUTION INTRAVENOUS at 13:14

## 2017-12-29 RX ADMIN — CEFEPIME 2 G: 1 INJECTION, SOLUTION INTRAVENOUS at 21:21

## 2017-12-29 RX ADMIN — POLYETHYLENE GLYCOL 3350 17 G: 17 POWDER, FOR SOLUTION ORAL at 10:14

## 2017-12-29 RX ADMIN — OLANZAPINE 5 MG: 5 TABLET, FILM COATED ORAL at 09:31

## 2017-12-29 RX ADMIN — CEFEPIME 2 G: 1 INJECTION, SOLUTION INTRAVENOUS at 03:46

## 2017-12-29 RX ADMIN — NAFCILLIN SODIUM 2 G: 2 INJECTION, POWDER, LYOPHILIZED, FOR SOLUTION INTRAMUSCULAR; INTRAVENOUS at 21:22

## 2017-12-29 RX ADMIN — ATORVASTATIN CALCIUM 40 MG: 40 TABLET, FILM COATED ORAL at 09:31

## 2017-12-29 RX ADMIN — HEPARIN SODIUM 5000 UNITS: 5000 INJECTION, SOLUTION INTRAVENOUS; SUBCUTANEOUS at 09:59

## 2017-12-29 RX ADMIN — SENNOSIDES AND DOCUSATE SODIUM 1 TABLET: 8.6; 5 TABLET ORAL at 10:13

## 2017-12-29 RX ADMIN — FAMOTIDINE 20 MG: 20 TABLET ORAL at 09:58

## 2017-12-29 RX ADMIN — CARVEDILOL 3.12 MG: 3.12 TABLET, FILM COATED ORAL at 09:30

## 2017-12-29 RX ADMIN — LOSARTAN POTASSIUM 25 MG: 25 TABLET ORAL at 09:30

## 2017-12-29 RX ADMIN — HEPARIN SODIUM 5000 UNITS: 5000 INJECTION, SOLUTION INTRAVENOUS; SUBCUTANEOUS at 21:13

## 2017-12-29 RX ADMIN — MULTIVITAMIN 15 ML: LIQUID ORAL at 09:29

## 2017-12-29 RX ADMIN — ACETYLCYSTEINE 4 ML: 100 INHALANT RESPIRATORY (INHALATION) at 08:19

## 2017-12-29 RX ADMIN — ALBUTEROL SULFATE 2.5 MG: 2.5 SOLUTION RESPIRATORY (INHALATION) at 14:57

## 2017-12-29 RX ADMIN — LOSARTAN POTASSIUM 25 MG: 25 TABLET ORAL at 21:14

## 2017-12-29 RX ADMIN — Medication 1 PACKET: at 16:57

## 2017-12-29 RX ADMIN — Medication 1 PACKET: at 21:20

## 2017-12-29 RX ADMIN — Medication 300 MG: at 09:29

## 2017-12-29 RX ADMIN — ALBUTEROL SULFATE 2.5 MG: 2.5 SOLUTION RESPIRATORY (INHALATION) at 19:56

## 2017-12-29 RX ADMIN — Medication 300 MG: at 21:21

## 2017-12-29 RX ADMIN — ASPIRIN 81 MG CHEWABLE TABLET 81 MG: 81 TABLET CHEWABLE at 09:31

## 2017-12-29 RX ADMIN — OLANZAPINE 5 MG: 5 TABLET, FILM COATED ORAL at 21:14

## 2017-12-29 RX ADMIN — Medication 1 PACKET: at 09:43

## 2017-12-29 NOTE — PLAN OF CARE
Problem: Patient Care Overview  Goal: Plan of Care/Patient Progress Review    Discharge Planner SLP   Patient plan for discharge: none stated  Current status: Pt remains appropriate for dysphagia diet level 1 and thin liquids. Pt should sit upright/alert, place food on R side of mouth, take small bites/sips and frequently check for pocketing given reduced awareness.    Barriers to return to prior living situation: cognition, medical clearance  Recommendations for discharge: TCU, ongoing SLP  Rationale for recommendations: dysphagia; maximize safety with oral intake, ongoing education       Entered by: Daniela Lehman 12/29/2017 2:09 PM

## 2017-12-29 NOTE — PLAN OF CARE
Problem: Patient Care Overview  Goal: Plan of Care/Patient Progress Review  Discharge Planner OT   Patient plan for discharge: rehab   Current status: Pt more alert and participatory during today's session however increased agitation as session progressed. Pt following 50% of commands. Pt max A for supine rolling and dependently lifted supine > EOB > wheelchair. Pt dangled EOB with mod-max A, pt with posterior and L lateral LOB and intermittently able to correct with cues.   Barriers to return to prior living situation: medical needs, residual R hemiparesis, cognitive impairments, deconditioning   Recommendations for discharge: TCU   Rationale for recommendations: Pt currently not a good candidate for ARU given waxing and waning agitation/lethargy and decreased tolerance for two disciplines. Pt would benefit from TCU to progress independence with ADLs and mobility.        Entered by: Alka Romero 12/29/2017 3:21 PM

## 2017-12-29 NOTE — PROGRESS NOTES
CVTS Progress Note  Attending provider: Pili Bowers,*  12/29/17      SUBJECTIVE:    No acute issues over night. Anxiety improved, was slow to wake up and converse this AM. Once awake, patient fully alert and able to communicate. Seemed in better spirits today.   No acute complaints, reports feels good today, pain well controlled.  Patient denies SOB, CP, fever, chills, sweats.     Patient has been tolerating diet. + BM. + flatus.  NSR       OBJECTIVE:   Temp:  [97.8  F (36.6  C)-99.1  F (37.3  C)] 98.2  F (36.8  C)  Pulse:  [102-106] 102  Heart Rate:  [100-107] 100  Resp:  [16-18] 18  BP: ()/(58-89) 92/60  SpO2:  [94 %-96 %] 94 %    Gen: NAD, more alert today  CV: RRR, normal S1, S2, no murmurs, rubs or gallops, JVD just above clavicle    Pulm:  CTA, No wheezing or rhonchi  Abd: Soft, NT, ND, +BS  Ext: no LE edema  Neuro: left facial droop and right sided weakness, not new.   Incision: c/d/i, no erythema, sternum stable  Tubes/drains: Dressing clean and dry.    I&O's:  I/O last 3 completed shifts:  In: 1266 [I.V.:490; NG/GT:420]  Out: 1475 [Urine:1475]    Labs:   BMP    Recent Labs  Lab 12/29/17  0732 12/28/17  0727 12/27/17  0430 12/26/17  0623    139 141 141   POTASSIUM 3.6 4.2 3.8 4.0   CHLORIDE 105 104 106 107   IZABELLA 9.4 9.5 8.6 8.9   CO2 26 26 28 26   BUN 33* 38* 41* 35*   CR 0.83 0.84 0.74 0.77   * 116* 159* 128*     CBC    Recent Labs  Lab 12/29/17  0732 12/28/17  0727 12/27/17  0430 12/26/17  0623   WBC 13.6* 14.0* 14.0* 15.8*   RBC 3.08* 2.96* 2.77* 2.80*   HGB 9.4* 9.0* 8.4* 8.5*   HCT 31.3* 30.1* 27.7* 28.1*   * 102* 100 100   MCH 30.5 30.4 30.3 30.4   MCHC 30.0* 29.9* 30.3* 30.2*   RDW 18.5* 18.7* 18.3* 18.0*   * 477* 410 370     INR  No lab results found in last 7 days.   Hepatic Panel No lab results found in last 7 days.  Glucose  Recent Labs  Lab 12/29/17  0732 12/28/17  2115 12/28/17  1914 12/28/17  1109 12/28/17  0727 12/27/17  0846 12/27/17  0430  12/26/17  1730 12/26/17  1142  12/26/17  0623  12/25/17  0951  12/24/17  0427   *  --   --   --  116*  --  159*  --   --   --  128*  --  146*  --  142*   BGM  --  148* 163* 135*  --  153*  --  115* 147*  < >  --   < >  --   < >  --    < > = values in this interval not displayed.    Imaging:   Exam: XR CHEST PORT 1 VW, 12/28/2017 11:45 AM     Indication: Interval;      Comparison: 12/27/2017     Findings:   Single sitting portable view of the chest. Lung bases collimated  outside the field-of-view. Patient rotated to the right, limiting  interpretation.      Right PICC line in place projecting over the low SVC. Sternotomy wires  in place. Cardiomediastinal silhouette is stable an enlarged. The  upper abdomen is unremarkable. No visualized pleural effusions. No  pneumothorax. There are slightly improved patchy opacities.         Impression: Improving patchy opacities. No pneumothorax.     I have personally reviewed the examination and initial interpretation  and I agree with the findings.     LALITHA CORTES MD ([place])        ASSESSMENT: Cricket Miguel is a 62 year old male with hx of COPD, HTN, HLD, CAD, heart failure, aortic stenosis and ascending aortic aneurysm s/p aortic valve replacement and aortic root replacement (April 2016) c/b moises-aortic abscess, endocarditis, cerebral septic emboli and severe AI s/p redo-sternotomy aortic root and valve homograft 12/19. Post op EF 20-25%.        PLAN:   Neuro/ pain/ sedation: Hx of multifocal acute infarctions involving the left parietotemporal lobes, left cerebral hemisphere and right occipital lobe presumed to be septic emboli. Depression, mild delirium, followed by psych.  -Monitor neurological status. Notify the MD for any acute changes in exam.  -Dilaudid PRN.  -Mental status improving.  - On psych rec started olanzapine. Agitation improving. Serial EKGs to evaluate QT interval. QTc today 479. Psych recommended decreasing zyprexa given increased flat affect and  delayed responses, however much more anxious 12/27 so dose kept at 5 mg BID. Doing better today with both anxiety and affect. Contine current dosing.    Pulmonary care: extubated 12/20  - On facemask 2 LPM saturation 100%.  - Encourage pulmonary toilet  - Nasotracheal suctioning  - Removed right chest tube 12/26   - Left pleural tube clamping trial okay, removed 12/27      Cardiovascular:  HTN, HLD, CAD, AS and aortic aneurysm s/p bentall c/b endocarditis and severe AI s/p aortic root/valve homograft 12/19.  -Keep SBP<120  - Cardiology recently started losartan 25 mg po bid. Pressure a little soft today ad patient reports being a little lightheaded. Gave 100 ml fluid back-BP improved. Stopped evening dose of Bumex, see below. May need to reduce Losartan dosing if not tolerating but patient appeared to be dry this AM.   - Coreg 3/125 bid  - ASA 81mg and atorvastatin.  - Echo shows EF 20-25% baseline 30%   - No bradycardia or pauses, has been in NSR for past 4 days, TPW removed 12/26     GI care: PEG  -famotidine  -senna/colace/miralax, +BM, cdiff negative 12/26      Fluids/ Electrolytes/ Nutrition:   -PRN electrolyte replacement  -Bolus tube feeds through PEG, dajuan counts     Renal/ Fluid Balance: Urine output is adequate so far.  -Espinoza catheter for strict intake and output.  - monitor BMP, creatinine  - IV lasix transitioned to Bumex 2 mg PO BID on 12/26, evening dose held on 12/29 due to hypotension and lightheadedness. Resume at Bumex 1 mg po bid 12/30.       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 Hx of MSSA septic arthritis and bacteremia. Hx of multifocal acute infarctions involving the left parietotemporal lobes, left cerebral hemisphere and right occipital lobe presumed to be septic emboli. ID following.   -New cultures BAL growing pseudomonas aeruginosa. ID following. Continue nafcillin/cefepime/rifampin per ID. WBC 13.6 trending down.     ID  rec's from 12/26  - restart PTA nafcillin 2g IV q4h, previously planned duration until 1/7/18  - continue rifampin 300mg PO BID, previously planned duration until 1/7/18  -continue cefepime for 2 total weeks duration for pseudomonas pneumonia (end date 1/3/18)  -F/u surgical tissue cultures      Heme: acute blood loss anemia  - Hgb stable.      Prophylaxis:    -Mechanical prophylaxis for DVT.   -Heparin TID.       Lines/ tubes/ drains:  -R triple lumen PICC, garzaMary      Disposition:  -Floor since 12/23   -Recommending discharge to TCU, early next week.     Staff surgeons have been informed of changes through both verbal and written communication.       Florence Chau PA-C  Cardiothoracic Surgery  Pager [phone]  December 29, 2017

## 2017-12-29 NOTE — PROGRESS NOTES
Social Work Services Progress Note    Hospital Day: 13  Date of Initial Social Work Evaluation:  12/26/17  Collaborated with: Attempted with pt and VERONICA Coffman    Data:  Pt is a 62 year old male being followed by SW for placement to rehab.    Intervention:  SW attempted to meet with pt and SO Meghna to discuss decline from [facility].  Pt was sleeping soundly in the room.  SW called and lm with SO and with pt's daughter Eloy as listed on the face sheet.  Will hand off to weekend SW to continue assisting with referrals for placement.      SW asked for a review from [facility] ARU team to see if pt would be a candidate for care.    Assessment:  Pt sleeping soundly in bed.  Per discussion with CVTS team, pt has had intermittent delirium and confusion post surgery which has been starting to clear.    Plan:    Anticipated Disposition:  Facility:  TCU     Barriers to d/c plan:  Medical stability, chest tubes, delirium/confusion.    Follow Up: SW to hand off to weekend to assist with discharge planning and placement.    ANTON Church, APSW  [address]   Phone: [phone]  Pager: [phone]  Unit: [phone]

## 2017-12-29 NOTE — PLAN OF CARE
"Problem: Patient Care Overview  Goal: Plan of Care/Patient Progress Review  /89 (BP Location: Left arm)  Pulse 105  Temp 97.8  F (36.6  C) (Oral)  Resp 18  Wt 81.7 kg (180 lb 1.6 oz)  SpO2 96%  BMI 27.38 kg/m2  Pt tachycardic in 100s, otherwise VSS.  T max 99.9.  Tylenol given.  Some expiratory wheezes noted.  Alert but oriented to self and family only.  Right-sided hemiparesis.  Left sided facial drrop.  Mood fairly calm.  Was agitated at start of evening shift and yelling out repeatedly for \"Meghna\" (significant other.)  After receiving Ativan 0.5mg po, pt calmed and tolerated bed bath and condom catheter application.  Was incontinent of urine x1 prior to condom cath.  Passing flatus.  No BM this shift.  Bowel meds held d/t report of loose stools on day shift.  Pt had poor appetite.  Did eat popsicle.  1/2 can tube feed bolus given per family request.  Medications through G-tube and orally if pt agreeable.  Double lumen PICC with TKO and intermittent IV abx in both lumens.  PRN zyprexa dc'ed and has Ativan 0.5mg po BID prn.  Received both doses today.  Currently resting/sleeping.  Cont with POC.      "

## 2017-12-29 NOTE — PROGRESS NOTES
Calorie Counts    Intake recorded for: 12/28 Kcals: 0 Protein: 0g    # meals recorded: No food intake recorded, 1 meal ordered through room service    # supplements recorded: 0

## 2017-12-29 NOTE — PLAN OF CARE
Problem: Patient Care Overview  Goal: Plan of Care/Patient Progress Review  /77  Pulse 106  Temp 97.8  F (36.6  C) (Oral)  Resp 18  Wt 74.5 kg (164 lb 3.9 oz)  SpO2 94%  BMI 24.97 kg/m2    Alert to self. VSS. Denied pain. Turn every 2 hours. Condom catheter in place. No BM this shift. Slept through the night. Administered schedule antibiotics. Patient pleasant and cooperative.  Cooperative with cares.

## 2017-12-30 LAB
ANION GAP SERPL CALCULATED.3IONS-SCNC: 10 MMOL/L (ref 3–14)
BUN SERPL-MCNC: 34 MG/DL (ref 7–30)
CALCIUM SERPL-MCNC: 9.3 MG/DL (ref 8.5–10.1)
CHLORIDE SERPL-SCNC: 108 MMOL/L (ref 94–109)
CO2 SERPL-SCNC: 24 MMOL/L (ref 20–32)
CREAT SERPL-MCNC: 0.93 MG/DL (ref 0.66–1.25)
ERYTHROCYTE [DISTWIDTH] IN BLOOD BY AUTOMATED COUNT: 18.6 % (ref 10–15)
GFR SERPL CREATININE-BSD FRML MDRD: 82 ML/MIN/1.7M2
GLUCOSE SERPL-MCNC: 121 MG/DL (ref 70–99)
HCT VFR BLD AUTO: 30.3 % (ref 40–53)
HGB BLD-MCNC: 9.1 G/DL (ref 13.3–17.7)
MAGNESIUM SERPL-MCNC: 2.2 MG/DL (ref 1.6–2.3)
MCH RBC QN AUTO: 30.6 PG (ref 26.5–33)
MCHC RBC AUTO-ENTMCNC: 30 G/DL (ref 31.5–36.5)
MCV RBC AUTO: 102 FL (ref 78–100)
PLATELET # BLD AUTO: 492 10E9/L (ref 150–450)
POTASSIUM SERPL-SCNC: 3.7 MMOL/L (ref 3.4–5.3)
RBC # BLD AUTO: 2.97 10E12/L (ref 4.4–5.9)
SODIUM SERPL-SCNC: 142 MMOL/L (ref 133–144)
WBC # BLD AUTO: 12.5 10E9/L (ref 4–11)

## 2017-12-30 PROCEDURE — 85027 COMPLETE CBC AUTOMATED: CPT | Performed by: PHYSICIAN ASSISTANT

## 2017-12-30 PROCEDURE — 25000132 ZZH RX MED GY IP 250 OP 250 PS 637: Performed by: PHYSICIAN ASSISTANT

## 2017-12-30 PROCEDURE — 25000132 ZZH RX MED GY IP 250 OP 250 PS 637: Performed by: INTERNAL MEDICINE

## 2017-12-30 PROCEDURE — 80048 BASIC METABOLIC PNL TOTAL CA: CPT | Performed by: PHYSICIAN ASSISTANT

## 2017-12-30 PROCEDURE — 94640 AIRWAY INHALATION TREATMENT: CPT | Mod: 76

## 2017-12-30 PROCEDURE — 25000132 ZZH RX MED GY IP 250 OP 250 PS 637: Performed by: ANESTHESIOLOGY

## 2017-12-30 PROCEDURE — 40000275 ZZH STATISTIC RCP TIME EA 10 MIN

## 2017-12-30 PROCEDURE — 25000125 ZZHC RX 250: Performed by: INTERNAL MEDICINE

## 2017-12-30 PROCEDURE — 36592 COLLECT BLOOD FROM PICC: CPT | Performed by: PHYSICIAN ASSISTANT

## 2017-12-30 PROCEDURE — 21400003 ZZH R&B CCU CRITICAL UMMC

## 2017-12-30 PROCEDURE — 25000125 ZZHC RX 250: Performed by: PHYSICIAN ASSISTANT

## 2017-12-30 PROCEDURE — 25000132 ZZH RX MED GY IP 250 OP 250 PS 637

## 2017-12-30 PROCEDURE — 27210913 ZZH NUTRITION PRODUCT INTERMEDIATE PACKET

## 2017-12-30 PROCEDURE — 25000132 ZZH RX MED GY IP 250 OP 250 PS 637: Performed by: HOSPITALIST

## 2017-12-30 PROCEDURE — 94640 AIRWAY INHALATION TREATMENT: CPT

## 2017-12-30 PROCEDURE — 25000132 ZZH RX MED GY IP 250 OP 250 PS 637: Performed by: SURGERY

## 2017-12-30 PROCEDURE — 25000128 H RX IP 250 OP 636: Performed by: PHYSICIAN ASSISTANT

## 2017-12-30 PROCEDURE — 83735 ASSAY OF MAGNESIUM: CPT | Performed by: PHYSICIAN ASSISTANT

## 2017-12-30 RX ORDER — FLUCONAZOLE 40 MG/ML
200 POWDER, FOR SUSPENSION ORAL DAILY
Status: DISCONTINUED | OUTPATIENT
Start: 2017-12-30 | End: 2017-12-30

## 2017-12-30 RX ORDER — FLUCONAZOLE 40 MG/ML
300 POWDER, FOR SUSPENSION ORAL DAILY
Status: DISCONTINUED | OUTPATIENT
Start: 2017-12-30 | End: 2018-01-03

## 2017-12-30 RX ADMIN — CARVEDILOL 3.12 MG: 3.12 TABLET, FILM COATED ORAL at 17:06

## 2017-12-30 RX ADMIN — ACETYLCYSTEINE 4 ML: 100 INHALANT RESPIRATORY (INHALATION) at 08:47

## 2017-12-30 RX ADMIN — LORAZEPAM 0.5 MG: 0.5 TABLET ORAL at 19:03

## 2017-12-30 RX ADMIN — ALBUTEROL SULFATE 2.5 MG: 2.5 SOLUTION RESPIRATORY (INHALATION) at 15:00

## 2017-12-30 RX ADMIN — Medication 1 PACKET: at 13:08

## 2017-12-30 RX ADMIN — POTASSIUM CHLORIDE 20 MEQ: 20 SOLUTION ORAL at 09:51

## 2017-12-30 RX ADMIN — BUMETANIDE 1 MG: 1 TABLET ORAL at 16:05

## 2017-12-30 RX ADMIN — Medication 1 PACKET: at 09:53

## 2017-12-30 RX ADMIN — CEFEPIME 2 G: 1 INJECTION, SOLUTION INTRAVENOUS at 04:32

## 2017-12-30 RX ADMIN — Medication 1 PACKET: at 20:13

## 2017-12-30 RX ADMIN — MINERAL OIL AND WHITE PETROLATUM: 150; 830 OINTMENT OPHTHALMIC at 22:34

## 2017-12-30 RX ADMIN — Medication 300 MG: at 10:19

## 2017-12-30 RX ADMIN — NAFCILLIN SODIUM 2 G: 2 INJECTION, POWDER, LYOPHILIZED, FOR SOLUTION INTRAMUSCULAR; INTRAVENOUS at 09:51

## 2017-12-30 RX ADMIN — NAFCILLIN SODIUM 2 G: 2 INJECTION, POWDER, LYOPHILIZED, FOR SOLUTION INTRAMUSCULAR; INTRAVENOUS at 12:35

## 2017-12-30 RX ADMIN — POTASSIUM CHLORIDE 20 MEQ: 20 SOLUTION ORAL at 20:13

## 2017-12-30 RX ADMIN — LOSARTAN POTASSIUM 25 MG: 25 TABLET ORAL at 20:13

## 2017-12-30 RX ADMIN — ASPIRIN 81 MG CHEWABLE TABLET 81 MG: 81 TABLET CHEWABLE at 09:50

## 2017-12-30 RX ADMIN — ATORVASTATIN CALCIUM 40 MG: 40 TABLET, FILM COATED ORAL at 09:50

## 2017-12-30 RX ADMIN — NAFCILLIN SODIUM 2 G: 2 INJECTION, POWDER, LYOPHILIZED, FOR SOLUTION INTRAMUSCULAR; INTRAVENOUS at 04:33

## 2017-12-30 RX ADMIN — NAFCILLIN SODIUM 2 G: 2 INJECTION, POWDER, LYOPHILIZED, FOR SOLUTION INTRAMUSCULAR; INTRAVENOUS at 20:12

## 2017-12-30 RX ADMIN — BUMETANIDE 1 MG: 1 TABLET ORAL at 09:50

## 2017-12-30 RX ADMIN — CEFEPIME 2 G: 1 INJECTION, SOLUTION INTRAVENOUS at 20:12

## 2017-12-30 RX ADMIN — HEPARIN SODIUM 5000 UNITS: 5000 INJECTION, SOLUTION INTRAVENOUS; SUBCUTANEOUS at 20:13

## 2017-12-30 RX ADMIN — ALBUTEROL SULFATE 2.5 MG: 2.5 SOLUTION RESPIRATORY (INHALATION) at 20:02

## 2017-12-30 RX ADMIN — HEPARIN SODIUM 5000 UNITS: 5000 INJECTION, SOLUTION INTRAVENOUS; SUBCUTANEOUS at 12:35

## 2017-12-30 RX ADMIN — NAFCILLIN SODIUM 2 G: 2 INJECTION, POWDER, LYOPHILIZED, FOR SOLUTION INTRAMUSCULAR; INTRAVENOUS at 16:06

## 2017-12-30 RX ADMIN — MIRTAZAPINE 30 MG: 15 TABLET, FILM COATED ORAL at 22:34

## 2017-12-30 RX ADMIN — FAMOTIDINE 20 MG: 20 TABLET ORAL at 09:50

## 2017-12-30 RX ADMIN — FAMOTIDINE 20 MG: 20 TABLET ORAL at 20:13

## 2017-12-30 RX ADMIN — Medication 300 MG: at 20:11

## 2017-12-30 RX ADMIN — ACETYLCYSTEINE 4 ML: 100 INHALANT RESPIRATORY (INHALATION) at 15:00

## 2017-12-30 RX ADMIN — CEFEPIME 2 G: 1 INJECTION, SOLUTION INTRAVENOUS at 12:34

## 2017-12-30 RX ADMIN — OLANZAPINE 5 MG: 5 TABLET, FILM COATED ORAL at 20:13

## 2017-12-30 RX ADMIN — HEPARIN SODIUM 5000 UNITS: 5000 INJECTION, SOLUTION INTRAVENOUS; SUBCUTANEOUS at 04:33

## 2017-12-30 RX ADMIN — POLYETHYLENE GLYCOL 3350 17 G: 17 POWDER, FOR SOLUTION ORAL at 09:51

## 2017-12-30 RX ADMIN — OLANZAPINE 5 MG: 5 TABLET, FILM COATED ORAL at 09:50

## 2017-12-30 RX ADMIN — CARVEDILOL 3.12 MG: 3.12 TABLET, FILM COATED ORAL at 09:50

## 2017-12-30 RX ADMIN — ALBUTEROL SULFATE 2.5 MG: 2.5 SOLUTION RESPIRATORY (INHALATION) at 08:47

## 2017-12-30 RX ADMIN — KETOTIFEN FUMARATE 1 DROP: 0.25 SOLUTION/ DROPS OPHTHALMIC at 20:12

## 2017-12-30 RX ADMIN — MULTIVITAMIN 15 ML: LIQUID ORAL at 09:50

## 2017-12-30 RX ADMIN — KETOTIFEN FUMARATE 1 DROP: 0.25 SOLUTION/ DROPS OPHTHALMIC at 13:08

## 2017-12-30 RX ADMIN — LOSARTAN POTASSIUM 25 MG: 25 TABLET ORAL at 09:50

## 2017-12-30 RX ADMIN — FLUCONAZOLE 300 MG: 40 POWDER, FOR SUSPENSION ORAL at 12:34

## 2017-12-30 RX ADMIN — KETOTIFEN FUMARATE 1 DROP: 0.25 SOLUTION/ DROPS OPHTHALMIC at 09:51

## 2017-12-30 RX ADMIN — NAFCILLIN SODIUM 2 G: 2 INJECTION, POWDER, LYOPHILIZED, FOR SOLUTION INTRAMUSCULAR; INTRAVENOUS at 00:06

## 2017-12-30 ASSESSMENT — VISUAL ACUITY
OU: OTHER (SEE COMMENT)
OU: BLURRED VISION

## 2017-12-30 NOTE — PROGRESS NOTES
CVTS Daily Note  12/30/2017  Attending: Pili Bowers,*    S:   No overnight events.  Pt seen at bedside resting comfortably.    Does complain of tongue pain without significant taste changes, otherwise no acute complaints.      Denies F/C/Sweats.  No CP, SOB, or calf pain.    Tolerating diet and tube feeds.  + BM.      Pain controlled well.    O:   Vitals:    12/29/17 1914 12/30/17 0022 12/30/17 0400 12/30/17 0710   BP: 132/82 (!) 89/67  109/86   BP Location: Left arm Left arm  Left arm   Pulse:       Resp: 18 16 16 16   Temp: 98.2  F (36.8  C) 98.9  F (37.2  C)  98.6  F (37  C)   TempSrc: Oral Axillary  Oral   SpO2: 94% 95%  95%   Weight:   74 kg (163 lb 2.3 oz)      Vitals:    12/26/17 0156 12/29/17 0600 12/30/17 0400   Weight: 81.7 kg (180 lb 1.6 oz) 74.5 kg (164 lb 3.9 oz) 74 kg (163 lb 2.3 oz)     - 9 kg since admit      Intake/Output Summary (Last 24 hours) at 12/30/17 0742  Last data filed at 12/30/17 0657   Gross per 24 hour   Intake             1290 ml   Output             1075 ml   Net              215 ml       MAPs: 75 - 98   Gen: AAO x 3, pleasant, NAD  Neuro: L sided weakness, R sided lack any purposeful movements   CV: RRR, S1S2 normal, no murmurs, rubs, or gallops.   Pulm: CTA, no rhonchi or wheezes  Abd: soft, non-tender, no guarding  Ext: no peripheral edema  Incision: clean, dry, intact, no erythema  Chest Tube sites: dressings clean and dry    Labs:  BMP    Recent Labs  Lab 12/30/17  0703 12/29/17  0732 12/28/17  0727 12/27/17  0430    139 139 141   POTASSIUM 3.7 3.6 4.2 3.8   CHLORIDE 108 105 104 106   IZABELLA 9.3 9.4 9.5 8.6   CO2 24 26 26 28   BUN 34* 33* 38* 41*   CR 0.93 0.83 0.84 0.74   * 118* 116* 159*     CBC    Recent Labs  Lab 12/30/17  0703 12/29/17  0732 12/28/17  0727 12/27/17  0430   WBC 12.5* 13.6* 14.0* 14.0*   RBC 2.97* 3.08* 2.96* 2.77*   HGB 9.1* 9.4* 9.0* 8.4*   HCT 30.3* 31.3* 30.1* 27.7*   * 102* 102* 100   MCH 30.6 30.5 30.4 30.3   MCHC 30.0* 30.0*  29.9* 30.3*   RDW 18.6* 18.5* 18.7* 18.3*   * 526* 477* 410     INR  No lab results found in last 7 days.   Hepatic Panel   Lab Results   Component Value Date    AST 32 12/18/2017     Lab Results   Component Value Date    ALT 30 12/18/2017     Lab Results   Component Value Date    ALBUMIN 1.5 12/18/2017     GLUCOSE:     Recent Labs  Lab 12/30/17  0703 12/29/17  2150 12/29/17  1703 12/29/17  1429 12/29/17  0732 12/28/17  2115 12/28/17  1914 12/28/17  1109 12/28/17  0727  12/27/17  0430  12/26/17  0623  12/25/17  0951   *  --   --   --  118*  --   --   --  116*  --  159*  --  128*  --  146*   BGM  --  120* 167* 127*  --  148* 163* 135*  --   < >  --   < >  --   < >  --    < > = values in this interval not displayed.      Imaging:  reviewed recent imaging       ASSESSMENT: Cricket Miguel is a 62 year old male with hx of COPD, HTN, HLD, CAD, heart failure, aortic stenosis and ascending aortic aneurysm s/p aortic valve replacement and aortic root replacement (April 2016) c/b moises-aortic abscess, endocarditis, cerebral septic emboli and severe AI s/p redo-sternotomy aortic root and valve homograft 12/19. Post op EF 20-25%.        PLAN:   Neuro/ pain/ sedation: Hx of multifocal acute infarctions involving the left parietotemporal lobes, left cerebral hemisphere and right occipital lobe presumed to be septic emboli. Depression, mild delirium, followed by psych.  -Monitor neurological status. Notify the MD for any acute changes in exam.  -Dilaudid PRN.  -Mental status improving.  - On psych rec started olanzapine. Agitation improving. Serial EKGs to evaluate QT interval. QTc today 479. Psych recommended decreasing zyprexa given increased flat affect and delayed responses, however much more anxious 12/27 so dose kept at 5 mg BID. Doing better today with both anxiety and affect. Contine current dosing.      Pulmonary care: extubated 12/20  - On facemask 2 LPM saturation 100%.  - Encourage pulmonary toilet  -  Nasotracheal suctioning  - Removed right chest tube 12/26   - Left pleural tube clamping trial okay, removed 12/27       Cardiovascular:    -Hx HTN, HLD, CAD, AS and aortic aneurysm s/p bentall c/b endocarditis and severe AI s/p aortic root/valve homograft 12/19.  -Keep SBP<120  - Cardiology recently started losartan 25 mg po bid. Pressure a little soft today and patient reported being a little lightheaded. Gave 100 ml fluid back- BP improved. Stopped evening dose of Bumex, see below. May need to reduce Losartan dosing if not tolerating but patient appeared to be dry this AM.   - Coreg 3.125 mg bid since 12/28  - ASA 81mg and atorvastatin.  - Echo shows EF 20-25% baseline 30%   - No bradycardia or pauses, has been in NSR for past 4 days, TPW removed 12/26      GI care:   - PEG  - famotidine  - senna/colace/miralax, +BM, cdiff negative 12/26       Fluids/ Electrolytes/ Nutrition:   -PRN electrolyte replacement  -Bolus tube feeds through PEG, dajuan counts     Renal/ Fluid Balance:   -Espinoza catheter for strict intake and output.  - monitor BMP, creatinine  - IV lasix transitioned to Bumex 2 mg PO BID on 12/26, evening dose held on 12/29 due to hypotension and lightheadedness. Resume at Bumex 1 mg po bid 12/30.        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 Hx of MSSA septic arthritis and bacteremia. Hx of multifocal acute infarctions involving the left parietotemporal lobes, left cerebral hemisphere and right occipital lobe presumed to be septic emboli. ID following.   -New cultures BAL growing pseudomonas aeruginosa. ID following. Continue nafcillin/cefepime/rifampin per ID. WBC 12.5 trending down.   - Fluconazole for oral thrush (7 days) 12/30     ID rec's from 12/26  - restart PTA nafcillin 2g IV q4h, previously planned duration until 1/7/18  - continue rifampin 300mg PO BID, previously planned duration until 1/7/18  -continue cefepime for 2 total  weeks duration for pseudomonas pneumonia (end date 1/3/18)  -F/u surgical tissue cultures       Heme:   - Acute blood loss anemia  - Hgb stable.        Prophylaxis:    -Mechanical prophylaxis for DVT.   -Heparin TID.       Lines/ tubes/ drains:  -R triple lumen PICC, Mary garza       Disposition:  -Floor since 12/23   -Recommending discharge to TCU when bed available, early next week.       Discussed with CVTS Fellow   Staff surgeons have been informed of changes through both  verbal and written communication.      Deny Trimble PA-C  Cardiothoracic Surgery  Pager [phone]    7:42 AM   December 30, 2017

## 2017-12-30 NOTE — PLAN OF CARE
Problem: Patient Care Overview  Goal: Plan of Care/Patient Progress Review  PT 6C: Hold- Per discussion with OT, patient with improving participation in therapy however only following ~50% of commands and continues to have agitation. Patient remains appropriate for 1 therapy discipline at this time. OT will continue to follow and notify PT when appropriate to to initiate.

## 2017-12-30 NOTE — PLAN OF CARE
Problem: Patient Care Overview  Goal: Plan of Care/Patient Progress Review  Outcome: No Change    D: Pt with hx AS, AAA s/p AVR April 2016 c/b endocarditis now POD 11 s/p redo sternotomy with redo aortic root and AV repair on 12/19.   I/A: IVABX as scheduled. Incontinence cares (large loose stool x1, large void). Assisted with repositioning as pt allowed. SubQ heparin for DVT ppx. Placed SCD's on bilateral LE, pt tolerated for ~2hrs and was found removing devices. Bed alarm on for pt safety. Pt sleeping between cares overnight without complaint.   P: Continue to monitor/assess pt condition and contact treatment team with questions or concerns.

## 2017-12-30 NOTE — PROGRESS NOTES
Calorie Count  Intake recorded for: 12/29/2017   Kcals: 745  Protein: 49g  # Meals Recorded: 1 meal (100% chicken and mashed potatoes with gravy, squash, applesauce, 4 oz orange juice, 4 oz 1% milk)  # Supplements Recorded: 100% 1 Gelatein Plus

## 2017-12-30 NOTE — PROGRESS NOTES
[facility]  CARDIOLOGY DAILY PROGRESS NOTE    Interval History:   Incontinent of urine and stool  Still agitated on and off and following 50% commands  Likely TCU early this next week  Sitting in chair    Objective  Temp:  [97.6  F (36.4  C)-98.9  F (37.2  C)] 98.6  F (37  C)  Pulse:  [102] 102  Heart Rate:  [] 100  Resp:  [16-18] 16  BP: ()/(58-86) 109/86  SpO2:  [94 %-96 %] 95 %   Room air     I/O last 3 completed shifts:  In: 1310 [P.O.:220; I.V.:580; NG/GT:510]  Out: 1075 [Urine:1075]     Wt Readings from Last 5 Encounters:   12/30/17 74 kg (163 lb 2.3 oz)   12/15/17 83 kg (183 lb)   11/02/17 88.3 kg (194 lb 10.7 oz)   02/06/17 91.6 kg (202 lb)   01/09/17 87.5 kg (193 lb)     GEN: no acute distress  HEENT: no icterus  CV: RRR, normal s1/s2, no murmurs/rubs/s3/s4, no heave. JVP at 8 cm.   CHEST: on room air, normal work of breathing, clear to ausculation bilaterally, no rales or wheezing  ABD: soft, non-tender, normal active bowel sounds  EXTR: pulses 2+ and equal. No clubbing, cyanosis.  Trace lower extremity edema.   NEURO: slight dysarthria, left facial droop, disoriented occasionally    Current Medications    bumetanide  1 mg Oral BID     potassium chloride  20 mEq Oral BID     acetylcysteine  4 mL Nebulization TID     albuterol  2.5 mg Nebulization TID     carvedilol  3.125 mg Oral BID w/meals     protein modular  1 packet Per Feeding Tube TID     famotidine  20 mg Oral BID     OLANZapine  5 mg Oral BID     nafcillin  2 g Intravenous Q4H     ceFEPIme (MAXIPIME) IV  2 g Intravenous Q8H     losartan  25 mg Oral Q12H KILO     polyethylene glycol  17 g Oral Daily     heparin  5,000 Units Subcutaneous Q8H     multivitamins with minerals  15 mL Per Feeding Tube Daily     sodium chloride (PF)  3 mL Intracatheter Q8H     senna-docusate  1-2 tablet Oral BID     mirtazapine  30 mg Oral At Bedtime     rifampin  300 mg Oral or Feeding Tube BID     ketotifen  1 drop Left Eye TID      artificial tears   Ophthalmic At Bedtime     aspirin  81 mg Oral or Feeding Tube Daily     atorvastatin  40 mg Oral Daily       IV fluid REPLACEMENT ONLY       IV fluid REPLACEMENT ONLY       Reason beta blocker order not selected       - MEDICATION INSTRUCTIONS -       Lab Values    Recent Labs  Lab 12/30/17  0703 12/29/17  0732 12/28/17  0727 12/27/17  0430    139 139 141   POTASSIUM 3.7 3.6 4.2 3.8   CHLORIDE 108 105 104 106   IZABELLA 9.3 9.4 9.5 8.6   CO2 24 26 26 28   BUN 34* 33* 38* 41*   CR 0.93 0.83 0.84 0.74   * 118* 116* 159*       Recent Labs  Lab 12/30/17  0703 12/29/17  0732 12/28/17  0727 12/27/17  0430   WBC 12.5* 13.6* 14.0* 14.0*   RBC 2.97* 3.08* 2.96* 2.77*   HGB 9.1* 9.4* 9.0* 8.4*   HCT 30.3* 31.3* 30.1* 27.7*   * 102* 102* 100   MCH 30.6 30.5 30.4 30.3   MCHC 30.0* 30.0* 29.9* 30.3*   RDW 18.6* 18.5* 18.7* 18.3*   * 526* 477* 410     Studies  EKG: low voltage, sinus rhythm     Transthoracic echocardiogram:   Left ventricular wall thickness is normal. Mild left ventricular dilation is present. The Ejection Fraction is estimated at 30-35%. Diastolic function not assessed due to tachycardia. Severe diffuse hypokinesis is present.     The right ventricle is normal size. Global right ventricular function is mildly reduced.  Both atria appear normal.  Trace mitral insufficiency is present.  The tricuspid valve is normal.     Vessels  The patient is post bio-Bentall - aortic root replacement with a 30 mm graft and AVR with a Trifecta valve. Today, there is circumferential free space noted surrounding the aortic graft, dehiscence and rocking of the graft from the native aorta and severe AI in the space surrounding the aortic graft (perivalvular/perigraft). The circumferential free space was present on the  previous studies of October 2017 but was filled with echodense material and the graft was not independently mobile, and there was no significant AI. The prosthetic aortic valve  leaflets are not well visualized today.     No pericardial effusion is present.     Compared to Previous Study  This study was compared with the study from 10/06/2017 . Since the prior study, the LV function has worsened and the aortic graft appears to be dehisced, as described above.    CXR  Impression:   1. Small left pleural effusion and associated retrocardiac subsegmental atelectasis and/or consolidation.  2. Stable postsurgical changes and supporting devices.   3. Stable pulmonary vascular congestion.      Assessment and Recommendation:  Cricket Miguel is a 62 year old male admitted with worsening dyspnea, thought to have acute severe AI on echocardiogram.  Pertinent history of aortic stenosis and ascending aortic aneurysm s/p AVR and root replacement April 2016.  Had complications of moises aortic abscess, endocarditis, and septic emboli to his brain.  He underwent redo sternotomy with aortic root and valve homograft 12/19.  Heart failure service is being consulted for newly reduced EF 20-25%.  Most likely related to  severe AI and recent surgery, and should improve over time.  Does not appear ischemic.     On losartan 25mg BID (switched from isordil/hydral combo since his renal function has improved back to normal).      On coreg 3.125 BID    Did have some lower MAPs in the last 24 hours.  Bumex decreased to 1 mg PO BID.      Continue lipitor and asa 81.    I have discussed the above with Dr. Crook    Thank you for consulting the cardiovascular services at the [facility]. Please do not hesitate to call us with any questions.      Pardeep Cruz MD PGY4  Cardiology Fellow  [phone]  Discussed but not seen.  Ja Crook

## 2017-12-30 NOTE — PLAN OF CARE
Problem: Patient Care Overview  Goal: Plan of Care/Patient Progress Review  Outcome: Improving  BP (!) 89/67 (BP Location: Left arm)  Pulse 102  Temp 98.9  F (37.2  C) (Axillary)  Resp 16  Wt 74.5 kg (164 lb 3.9 oz)  SpO2 95%  BMI 24.97 kg/m2    Pt denies pain through the day, VSS on room air.  Medication regiment followed through the day.  Incontinent of bladder and bowel. Communication limited related to hx of stroke.  Continue with plan of care, family concerned for placement at TCU, referred to SW.  Notify CVTS with changes.

## 2017-12-31 ENCOUNTER — APPOINTMENT (OUTPATIENT)
Dept: OCCUPATIONAL THERAPY | Facility: CLINIC | Age: 62
DRG: 219 | End: 2017-12-31
Attending: INTERNAL MEDICINE
Payer: COMMERCIAL

## 2017-12-31 ENCOUNTER — APPOINTMENT (OUTPATIENT)
Dept: GENERAL RADIOLOGY | Facility: CLINIC | Age: 62
DRG: 219 | End: 2017-12-31
Attending: PHYSICIAN ASSISTANT
Payer: COMMERCIAL

## 2017-12-31 LAB
ANION GAP SERPL CALCULATED.3IONS-SCNC: 10 MMOL/L (ref 3–14)
BACTERIA SPEC CULT: NO GROWTH
BACTERIA SPEC CULT: NO GROWTH
BUN SERPL-MCNC: 28 MG/DL (ref 7–30)
CALCIUM SERPL-MCNC: 9.3 MG/DL (ref 8.5–10.1)
CHLORIDE SERPL-SCNC: 107 MMOL/L (ref 94–109)
CO2 SERPL-SCNC: 25 MMOL/L (ref 20–32)
CREAT SERPL-MCNC: 0.86 MG/DL (ref 0.66–1.25)
ERYTHROCYTE [DISTWIDTH] IN BLOOD BY AUTOMATED COUNT: 18.7 % (ref 10–15)
GFR SERPL CREATININE-BSD FRML MDRD: 89 ML/MIN/1.7M2
GLUCOSE SERPL-MCNC: 108 MG/DL (ref 70–99)
HCT VFR BLD AUTO: 29.6 % (ref 40–53)
HGB BLD-MCNC: 8.8 G/DL (ref 13.3–17.7)
MCH RBC QN AUTO: 30.9 PG (ref 26.5–33)
MCHC RBC AUTO-ENTMCNC: 29.7 G/DL (ref 31.5–36.5)
MCV RBC AUTO: 104 FL (ref 78–100)
PLATELET # BLD AUTO: 458 10E9/L (ref 150–450)
POTASSIUM SERPL-SCNC: 3.5 MMOL/L (ref 3.4–5.3)
RBC # BLD AUTO: 2.85 10E12/L (ref 4.4–5.9)
SODIUM SERPL-SCNC: 141 MMOL/L (ref 133–144)
SPECIMEN SOURCE: NORMAL
SPECIMEN SOURCE: NORMAL
WBC # BLD AUTO: 11.9 10E9/L (ref 4–11)

## 2017-12-31 PROCEDURE — 85027 COMPLETE CBC AUTOMATED: CPT | Performed by: PHYSICIAN ASSISTANT

## 2017-12-31 PROCEDURE — 25000132 ZZH RX MED GY IP 250 OP 250 PS 637: Performed by: PHYSICIAN ASSISTANT

## 2017-12-31 PROCEDURE — 27210913 ZZH NUTRITION PRODUCT INTERMEDIATE PACKET

## 2017-12-31 PROCEDURE — 25000132 ZZH RX MED GY IP 250 OP 250 PS 637: Performed by: INTERNAL MEDICINE

## 2017-12-31 PROCEDURE — 21400003 ZZH R&B CCU CRITICAL UMMC

## 2017-12-31 PROCEDURE — 40000802 ZZH SITE CHECK

## 2017-12-31 PROCEDURE — 97535 SELF CARE MNGMENT TRAINING: CPT | Mod: GO

## 2017-12-31 PROCEDURE — 25000132 ZZH RX MED GY IP 250 OP 250 PS 637: Performed by: HOSPITALIST

## 2017-12-31 PROCEDURE — 25000125 ZZHC RX 250: Performed by: INTERNAL MEDICINE

## 2017-12-31 PROCEDURE — 25000125 ZZHC RX 250: Performed by: PHYSICIAN ASSISTANT

## 2017-12-31 PROCEDURE — 25000128 H RX IP 250 OP 636: Performed by: PHYSICIAN ASSISTANT

## 2017-12-31 PROCEDURE — 36592 COLLECT BLOOD FROM PICC: CPT | Performed by: PHYSICIAN ASSISTANT

## 2017-12-31 PROCEDURE — 80048 BASIC METABOLIC PNL TOTAL CA: CPT | Performed by: PHYSICIAN ASSISTANT

## 2017-12-31 PROCEDURE — 71010 XR CHEST PORT 1 VW: CPT

## 2017-12-31 PROCEDURE — 25000132 ZZH RX MED GY IP 250 OP 250 PS 637: Performed by: THORACIC SURGERY (CARDIOTHORACIC VASCULAR SURGERY)

## 2017-12-31 PROCEDURE — 25000132 ZZH RX MED GY IP 250 OP 250 PS 637: Performed by: ANESTHESIOLOGY

## 2017-12-31 PROCEDURE — 25000132 ZZH RX MED GY IP 250 OP 250 PS 637: Performed by: SURGERY

## 2017-12-31 PROCEDURE — 40000133 ZZH STATISTIC OT WARD VISIT

## 2017-12-31 PROCEDURE — 97530 THERAPEUTIC ACTIVITIES: CPT | Mod: GO

## 2017-12-31 RX ORDER — ACETYLCYSTEINE 100 MG/ML
4 SOLUTION ORAL; RESPIRATORY (INHALATION) 3 TIMES DAILY PRN
Status: DISCONTINUED | OUTPATIENT
Start: 2017-12-31 | End: 2018-01-01

## 2017-12-31 RX ORDER — ALBUTEROL SULFATE 0.83 MG/ML
2.5 SOLUTION RESPIRATORY (INHALATION) 3 TIMES DAILY PRN
Status: DISCONTINUED | OUTPATIENT
Start: 2017-12-31 | End: 2018-01-04 | Stop reason: HOSPADM

## 2017-12-31 RX ADMIN — FLUCONAZOLE 300 MG: 40 POWDER, FOR SUSPENSION ORAL at 07:40

## 2017-12-31 RX ADMIN — NAFCILLIN SODIUM 2 G: 2 INJECTION, POWDER, LYOPHILIZED, FOR SOLUTION INTRAMUSCULAR; INTRAVENOUS at 07:30

## 2017-12-31 RX ADMIN — Medication 1 PACKET: at 13:41

## 2017-12-31 RX ADMIN — CEFEPIME 2 G: 1 INJECTION, SOLUTION INTRAVENOUS at 19:35

## 2017-12-31 RX ADMIN — MINERAL OIL AND WHITE PETROLATUM: 150; 830 OINTMENT OPHTHALMIC at 22:54

## 2017-12-31 RX ADMIN — BUMETANIDE 1 MG: 1 TABLET ORAL at 16:09

## 2017-12-31 RX ADMIN — LORAZEPAM 0.5 MG: 0.5 TABLET ORAL at 22:46

## 2017-12-31 RX ADMIN — LOSARTAN POTASSIUM 25 MG: 25 TABLET ORAL at 07:40

## 2017-12-31 RX ADMIN — CEFEPIME 2 G: 1 INJECTION, SOLUTION INTRAVENOUS at 12:01

## 2017-12-31 RX ADMIN — OLANZAPINE 5 MG: 5 TABLET, FILM COATED ORAL at 19:35

## 2017-12-31 RX ADMIN — FAMOTIDINE 20 MG: 20 TABLET ORAL at 19:35

## 2017-12-31 RX ADMIN — OLANZAPINE 5 MG: 5 TABLET, FILM COATED ORAL at 07:50

## 2017-12-31 RX ADMIN — NAFCILLIN SODIUM 2 G: 2 INJECTION, POWDER, LYOPHILIZED, FOR SOLUTION INTRAMUSCULAR; INTRAVENOUS at 16:09

## 2017-12-31 RX ADMIN — ASPIRIN 81 MG CHEWABLE TABLET 81 MG: 81 TABLET CHEWABLE at 07:40

## 2017-12-31 RX ADMIN — Medication 1 PACKET: at 20:10

## 2017-12-31 RX ADMIN — BUMETANIDE 1 MG: 1 TABLET ORAL at 07:40

## 2017-12-31 RX ADMIN — CEFEPIME 2 G: 1 INJECTION, SOLUTION INTRAVENOUS at 03:51

## 2017-12-31 RX ADMIN — ATORVASTATIN CALCIUM 40 MG: 40 TABLET, FILM COATED ORAL at 07:40

## 2017-12-31 RX ADMIN — MIRTAZAPINE 30 MG: 15 TABLET, FILM COATED ORAL at 22:42

## 2017-12-31 RX ADMIN — HEPARIN SODIUM 5000 UNITS: 5000 INJECTION, SOLUTION INTRAVENOUS; SUBCUTANEOUS at 20:11

## 2017-12-31 RX ADMIN — LOSARTAN POTASSIUM 25 MG: 25 TABLET ORAL at 19:35

## 2017-12-31 RX ADMIN — POTASSIUM CHLORIDE 20 MEQ: 1.5 POWDER, FOR SOLUTION ORAL at 11:51

## 2017-12-31 RX ADMIN — NAFCILLIN SODIUM 2 G: 2 INJECTION, POWDER, LYOPHILIZED, FOR SOLUTION INTRAMUSCULAR; INTRAVENOUS at 12:01

## 2017-12-31 RX ADMIN — HEPARIN SODIUM 5000 UNITS: 5000 INJECTION, SOLUTION INTRAVENOUS; SUBCUTANEOUS at 03:51

## 2017-12-31 RX ADMIN — CARVEDILOL 3.12 MG: 3.12 TABLET, FILM COATED ORAL at 18:30

## 2017-12-31 RX ADMIN — MULTIVITAMIN 15 ML: LIQUID ORAL at 07:40

## 2017-12-31 RX ADMIN — Medication 300 MG: at 07:41

## 2017-12-31 RX ADMIN — Medication 1 PACKET: at 07:43

## 2017-12-31 RX ADMIN — NAFCILLIN SODIUM 2 G: 2 INJECTION, POWDER, LYOPHILIZED, FOR SOLUTION INTRAMUSCULAR; INTRAVENOUS at 00:38

## 2017-12-31 RX ADMIN — HEPARIN SODIUM 5000 UNITS: 5000 INJECTION, SOLUTION INTRAVENOUS; SUBCUTANEOUS at 11:51

## 2017-12-31 RX ADMIN — Medication 300 MG: at 20:18

## 2017-12-31 RX ADMIN — POTASSIUM CHLORIDE 20 MEQ: 20 SOLUTION ORAL at 20:10

## 2017-12-31 RX ADMIN — FAMOTIDINE 20 MG: 20 TABLET ORAL at 07:41

## 2017-12-31 RX ADMIN — KETOTIFEN FUMARATE 1 DROP: 0.25 SOLUTION/ DROPS OPHTHALMIC at 13:41

## 2017-12-31 RX ADMIN — KETOTIFEN FUMARATE 1 DROP: 0.25 SOLUTION/ DROPS OPHTHALMIC at 20:11

## 2017-12-31 RX ADMIN — POTASSIUM CHLORIDE 20 MEQ: 20 SOLUTION ORAL at 11:51

## 2017-12-31 RX ADMIN — KETOTIFEN FUMARATE 1 DROP: 0.25 SOLUTION/ DROPS OPHTHALMIC at 07:51

## 2017-12-31 RX ADMIN — NAFCILLIN SODIUM 2 G: 2 INJECTION, POWDER, LYOPHILIZED, FOR SOLUTION INTRAMUSCULAR; INTRAVENOUS at 04:00

## 2017-12-31 RX ADMIN — CARVEDILOL 3.12 MG: 3.12 TABLET, FILM COATED ORAL at 07:40

## 2017-12-31 RX ADMIN — NAFCILLIN SODIUM 2 G: 2 INJECTION, POWDER, LYOPHILIZED, FOR SOLUTION INTRAMUSCULAR; INTRAVENOUS at 20:11

## 2017-12-31 ASSESSMENT — VISUAL ACUITY: OU: OTHER (SEE COMMENT)

## 2017-12-31 NOTE — PLAN OF CARE
Problem: Patient Care Overview  Goal: Plan of Care/Patient Progress Review  D/I/A: Pt here s/p aortic replacement and redo sternotomy following complications of AVR/aortic aneurysm/aortic root replacement from 4-2016. Hx of stroke w/ R hemiparesis/facial droop. Coccyxi red blanchable. Condom cath replaced. Up in chair w/ therapy for a bit this AM. Total feed, fair appetite. K 3.5 replaced per protocol. IV ABX infusing per MAR. Cap changed on PICC line. Tube feeds given bolus per order.  P: Rehab when bed available.

## 2017-12-31 NOTE — PLAN OF CARE
Problem: Patient Care Overview  Goal: Plan of Care/Patient Progress Review  Outcome: Improving  AI: Pt with h/y of COPD, HTN,HLD, CAD, HF,aortic stenosis, AA aneurysm,aortic valve replacement,aortic root replacement (april,2016) complicated by moises  aortic abscess, endocarditis, redo sternotomy aortic root and valve homograft 12/19 , CVA per note.   VSS, SR with HR at 80-85, BP stable,  no neuro changes. Condom catheter in place with AUO, repositioned e/y 2 hours,  meds through G -tube. Potassium replaced per active order. Coccyx  red,blanchable,barrier cream applied.   Plan: Continue to monitor,notify MD as needed. Possible will be dc toTCU next week.

## 2017-12-31 NOTE — CONSULTS
[facility], [place]      Neurology Stroke Consult    Patient Name: Cricket Miguel  : 1955 MRN#: [id number]    STROKE DATA    Stroke Code:  Stroke code not activated.  Time patient seen:  2017 0800  Last known normal (pt's baseline):  multiple days ago    National Institutes of Health Stroke Scale (at presentation)  NIHSS done at:  time patient seen      Score    Level of consciousness:  (1)   Not alert; arousable w/ minor stim to obey/answer/respond     LOC questions:  (2)   Answers neither question correctly    LOC commands:  (1)   Performs one task correctly    Best gaze:  (0)   Normal    Visual:  (1)   Partial hemianopia    Facial palsy:  (2)   Partial paralysis (total/near total of lower face)    Motor arm (left):  (0)   No drift    Motor arm (right):  (2)   Some effort against gravity    Motor leg (left):  (0)   No drift    Motor leg (right):  (3)   No effort against gravity    Limb ataxia:  (1)   Present in one limb    Sensory:  (1)   Mild to moderate sensory loss    Best language:  (1)   Mild to moderate aphasia    Dysarthria:  (1)   Mild to moderate dysarthria    Extinction and inattention:  (0)   No abnormality        NIHSS Total Score:  15        Dysphagia Screen  Time of screenin2017 0800  Screening results: Per Nursing     ASSESSMENT & RECOMMENDATIONS     The patient was seen for 'hx of L sided stroke, s/p aortic root with persistent Sx, family requesting Neuro consult for prognosis'    62 year old gentleman w/ h/o AVR and ascending aortic repair in 2016, 1st degree AVB present, observing with serial EKGs. admitted 10/2017 w acute mental status changes found to have large moises-aortic fluid collection and multiple strokes likely septic emboli. The strokes involved the L cerebellar, L MCA, and R occipital regions. Small microhemorrhages were observed.  Cerebral angiogram showed no evidence of mycotic aneurysm.   Blood cultures grew MSSA at the time  "intermittently.  Likely source of L knee septic arthritis which underwent arthroscopic irrigation and debridement.  Admitted 12/2017 for AI now s/p root replacement and AVR.  Heart prognosis apparently good.  From neurological standpoint, the patient is making progress, mRS now is a 4 with possibility for improvement.  Should continue pursuing OT and PT upon discharge as much as is tolerable from patient's point of view.      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 same-sided stroke, and      Recommendations:  -prognosis is good from stroke-neurology perspective and patient has made considerable improvement in 2 months, most recovery is made within the first 6 months and stabilizes by 12  -PT/OT recs are sound, suggest adding fluoxetine 20mg per FLAME trial evidence of motor improvement  -f/u neurology stroke clinic upon discharge within 3 months        HPI  \"62 year male with past medical history of aortic stenosis with ascending aortic root aneurysm who is status post aortic valve replacement and aortic root replacement with a composite graft in April 2016, mild COPD, history of heart failure with preserved ejection fraction, hypertension, hyperlipidemia who was recently admitted in October 2017 with altered mentation.  He was noted septic and infectious workup revealing for a large periaortic abscess around his replaced aortic root and valve this was leading to septic emboli resulting in CVA.  He has residual left-sided facial droop, and right-sided upper extremity weakness following that stroke.  He was discharged from the hospital and rehab at an acute care facility and recently discharged over the past week.  The plan was to have several months of IV antibiotics along with rehabilitation following a stroke and then proceeding to surgical placement and repair of the abscessed area.  The patient now presents with approximately 48 hour history of worsening shortness of breath at rest, orthopnea, and PND.  The " "wife and the patient reported that he was doing relatively well until the past 2-3 days.     The patient was seen at [facility] emergency room, he was noted to be volume overloaded and given Lasix 40 mg IV.  Chest x-ray was revealing for pulmonary edema.  His blood pressure and heart rate were within normal limits and his basic labs were largely unremarkable.  Notable labs however where an elevated BNP and mild troponin elevation.\"    Pertinent Past Medical/Surgical HistoryPast Medical History:   Diagnosis Date     Abscess of aortic root 09/2017     AI (aortic insufficiency)     severe     Anxiety      Aortic root dilatation (H)      AR (aortic regurgitation)     severe     Cardiomyopathy (H)      CHF (congestive heart failure), NYHA class II (H)      COPD, mild (H)     spirometry 12/16     CVA (cerebral vascular accident) (H) 09/2017    septic emboli - MSSA - cerebellum, Left MCA, Rt Occipital, Aphasia, Left visual field cut, moderate to severe encephalopathy     Depression 09/2017     Heart murmur      Hyperlipidemia LDL goal <70 10/31/2010     Hypertension goal BP (blood pressure) < 140/90      Inguinal hernia      left lung nodule  1/29/2008     Major depressive disorder, single episode, moderate (H)      Psoriasis childhood     Tobacco use disorder        Past Surgical History:   Procedure Laterality Date     ARTHROSCOPY KNEE INCISION AND DRAINAGE Left 10/8/2017    Procedure: ARTHROSCOPY KNEE INCISION AND DRAINAGE;  Arthroscopic Incision and Drainage of Left Knee;  Surgeon: Lucretia Munoz MD;  Location: [facility] OR     HC SHOULDER ARTHROSCOPY, DX  2001    Arthroscopy, Shoulder RT Dr OSIRIS Andersen     HC SHOULDER ARTHROSCOPY, DX  1/04    LT arthroscopy Dr OSIRIS Andersen     HERNIA REPAIR, UMBILICAL  1/08    incarcerated - dr rockwell     HERNIORRHAPHY INGUINAL  12/21/2012    HERNIORRHAPHY INGUINAL;  Right Inguinal Hernia Repair with mesh ;  Surgeon: Mello Rockwell MD;  Location: [facility] OR     REPAIR ANEURYSM " ASCENDING AORTA N/A 12/19/2017    Procedure: REPAIR ANEURYSM ASCENDING AORTA;  REDO Median STERNOTOMY, FEMORAL CANNULATION, Lysis of adhesions, AORTIC ROOT VALVE HOMOGRAFT with 26mm x 7.0cm Cryolife Aortic valve and conduit;  Surgeon: Pili Bowers MD;  Location: [facility] OR     REPLACE VALVE AORTIC N/A 5/17/2016    REPLACE VALVE AORTIC - Dr Bowers     ROTATOR CUFF REPAIR RT/LT  11/10    Rt arthroscopy - Dr OSIRIS Andersen     SURGICAL HISTORY OF -   5/16    AVR, severe AR, aortic root repair     TRACHEOSTOMY PERCUTANEOUS  10/27/2017            Medications:   Current Facility-Administered Medications   Medication     fluconazole (DIFLUCAN) suspension 300 mg     bumetanide (BUMEX) tablet 1 mg     potassium chloride (KAYCIEL) solution 20 mEq     acetylcysteine (MUCOMYST) 10 % injection 4 mL     albuterol neb solution 2.5 mg     LORazepam (ATIVAN) tablet 0.5 mg     carvedilol (COREG) tablet 3.125 mg     protein modular (PROSource TF) 1 packet     famotidine (PEPCID) tablet 20 mg     OLANZapine (zyPREXA) tablet 5 mg     nafcillin IV 2 g vial to attach to  ml bag     ceFEPIme (MAXIPIME) intermittent infusion 2 g     losartan (COZAAR) tablet 25 mg     HYDROmorphone (PF) (DILAUDID) injection 0.2 mg     oxyCODONE IR (ROXICODONE) tablet 5 mg     polyethylene glycol (MIRALAX/GLYCOLAX) Packet 17 g     heparin sodium PF injection 5,000 Units     dextrose 10 % 1,000 mL infusion     multivitamins with minerals (CERTAVITE/CEROVITE) liquid 15 mL     Carboxymethylcellulose Sod PF (REFRESH PLUS) 0.5 % ophthalmic solution 1 drop     naloxone (NARCAN) injection 0.1-0.4 mg     dextrose 10 % 1,000 mL infusion     glucose 40 % gel 15-30 g    Or     dextrose 50 % injection 25-50 mL    Or     glucagon injection 1 mg     lidocaine 1 % 1 mL     lidocaine (LMX4) kit     sodium chloride (PF) 0.9% PF flush 3 mL     sodium chloride (PF) 0.9% PF flush 3 mL     Reason beta blocker order not selected     hydrALAZINE (APRESOLINE) injection 10  mg     acetaminophen (TYLENOL) tablet 650 mg     ondansetron (ZOFRAN-ODT) ODT tab 4 mg    Or     ondansetron (ZOFRAN) injection 4 mg     senna-docusate (SENOKOT-S;PERICOLACE) 8.6-50 MG per tablet 1-2 tablet     temazepam (RESTORIL) capsule 15 mg     acetaminophen (TYLENOL) solution 650 mg     potassium chloride SA (K-DUR/KLOR-CON M) CR tablet 20-40 mEq     potassium chloride (KLOR-CON) Packet 20-40 mEq     potassium chloride 10 mEq in 100 mL sterile water intermittent infusion (premix)     potassium chloride 10 mEq in 100 mL intermittent infusion with 10 mg lidocaine     potassium chloride 20 mEq in 50 mL intermittent infusion     magnesium sulfate 2 g in NS intermittent infusion (PharMEDium or FV Cmpd)     magnesium sulfate 4 g in 100 mL sterile water (premade)     potassium phosphate 10 mmol in D5W 250 mL intermittent infusion     potassium phosphate 15 mmol in D5W 250 mL intermittent infusion     potassium phosphate 20 mmol in D5W 500 mL intermittent infusion     potassium phosphate 20 mmol in D5W 250 mL intermittent infusion     potassium phosphate 25 mmol in D5W 500 mL intermittent infusion     mirtazapine (REMERON) tablet 30 mg     rifampin (REIFADEN) suspension 300 mg     ketotifen (ZADITOR/REFRESH ANTI-ITCH) 0.025 % ophthalmic solution 1 drop     artificial tears ophthalmic ointment     aspirin chewable tablet 81 mg     atorvastatin (LIPITOR) tablet 40 mg     Continuing beta blocker from home medication list OR beta blocker order already placed during this visit   .    Allergies:   Allergies   Allergen Reactions     Lisinopril Swelling     One-sided facial swelling after being on lisinopril/HCTZ for one week.    .    Family History:   Family History   Problem Relation Age of Onset     Genetic Disorder Mother      Bone plateover growth Agustin. shoulder surgeries     CANCER Mother      brain cancer     Alzheimer Disease Father    .    Social History:   Social History   Substance Use Topics     Smoking status:  Former Smoker     Packs/day: 1.00     Years: 35.00     Quit date: 4/1/2016     Smokeless tobacco: Never Used      Comment: 4/2016     Alcohol use 0.0 oz/week     0 Standard drinks or equivalent per week      Comment: 1 drink per week avg, seldom   .    Tobacco use: Former smoker, last smoked 2016    ROS:  The 10 point Review of Systems is negative other than noted in the HPI or here.    PHYSICAL EXAMINATION  Vitals:    12/30/17 2230 12/31/17 0354 12/31/17 0500 12/31/17 0733   BP: 124/85 110/80  137/82   BP Location:    Left arm   Pulse:       Resp: 16 16  16   Temp: 97  F (36.1  C)   98.8  F (37.1  C)   TempSrc: Axillary   Oral   SpO2: 97% 97%  94%   Weight:   73.9 kg (163 lb)        General:  patient lying in bed, communicating with dysarthric speech but responding to the questions asked of him  HEENT:  trach scar present, left eye injected, oral mucosa dry mouth open at rest  Cardio:  systolic murmur heard, diastolic murmur heard  Pulmonary:  no respiratory distress  Abdomen:  soft, non-tender  Extremities:  peripheral pulses palpable  Skin:  intact     Neurologic  Mental Status:  fully alert, attentive but not fully oriented to time or place ([place] stated as place, time Dec 2018), follows commands, speech dysarthric and difficult to interpret  Cranial Nerves:  visual field cut left hemiquadrant, pupils reactive to light not completely symmetric 4 and 3mm, EOMI with normal pursuit, facial sensation intact and symmetric, facial movements asymmetric with profound left sided weakness, hearing not formally tested but intact to conversation, palate elevation symmetric and uvula midline, severe dysarthria, shoulder shrug weak on right, tongue protrusion midline  Motor:  Ataxia on left hand (patient is left handed), spasticity in RUE, claw hand beginning to form, normal muscle bulk, no pronator drift in left extremities, finger tapping discordant on left, is able to move his RUE but not to command consistently,    Sensory:  Difficult to assess reliably due to aphasia and command following ability but patient does appear to have loss of sensation at least on RUE to pinprick and soft touch  Coordination:  FNF dysmetric on LUE, LLE without gross abnormalities  Station/Gait:  unable to test    Labs  Labs and Imaging reviewed and used in developing the plan; pertinent results included.     Lab Results   Component Value Date     (H) 12/31/2017       The patient was discussed with the Fellow, Dr. Archuleta.  The staff is Dr. Johnson.    Javier Ace MD   Pager: [phone]

## 2017-12-31 NOTE — PLAN OF CARE
"Problem: Patient Care Overview  Goal: Plan of Care/Patient Progress Review  OT/6C - Discharge Planner OT   Patient plan for discharge: TCU per chart review  Current status: Pt calm and appropriate throughout entire session. Increased IND with rolling to the right (min A) compared to rolling to the left (max A). Dependently lifted to bedside recliner with Ax2 for increased OOB activity.  Pt scores 20/28 on Short Blessed Test indicating severe cognitive impairment; pt demonstrates some insight into cognitive deficits as he reports that the test was \"hard\". Set up assist only for facial grooming while seated in chair.   Barriers to return to prior living situation: cognition, strength, activity tolerance, post surgical precautions  Recommendations for discharge: TCU  Rationale for recommendations: to increase safety and IND with ADL/IADL prior to return home       Entered by: Esthela Rodrigues 12/31/2017 10:36 AM           "

## 2017-12-31 NOTE — PLAN OF CARE
-Pt has been incontinent of stool and urine on many occassions throughout the day and has required frequent moises care as his skin turns red easily.  -Pt is difficult to understand sometimes  But works well with staff and cooperates with cares. Pt had some episodes of arguing with some of the many family members who visited him today.  -Pt was fed each of his meals and fed himself poorly on a few occassions.

## 2017-12-31 NOTE — PROGRESS NOTES
"CVTS Daily Note  12/31/2017  Attending: Pili Bowers,*    S:   No overnight events. Neuro team to reassess today.   Pt seen at bedside resting comfortably. No acute complaints.      Denies F/C/Sweats.  No CP, SOB, or calf pain.    Tolerating diet (DD1 w thins) and tube feeds.  + BM.  + Flatus.    Pain controlled well.    O:   Vitals:    12/30/17 2230 12/31/17 0354 12/31/17 0500 12/31/17 0733   BP: 124/85 110/80  137/82   BP Location:    Left arm   Pulse:       Resp: 16 16  16   Temp: 97  F (36.1  C)   98.8  F (37.1  C)   TempSrc: Axillary   Oral   SpO2: 97% 97%  94%   Weight:   73.9 kg (163 lb)      Vitals:    12/29/17 0600 12/30/17 0400 12/31/17 0500   Weight: 74.5 kg (164 lb 3.9 oz) 74 kg (163 lb 2.3 oz) 73.9 kg (163 lb)       Intake/Output Summary (Last 24 hours) at 12/31/17 0931  Last data filed at 12/31/17 0701   Gross per 24 hour   Intake              573 ml   Output              470 ml   Net              103 ml       MAPs: 94 - 99  Gen: AAO x 3, pleasant, NAD  HEENT: L sided injected sclera, L facial droop, tongue and tonsillar pillars erythematous.   Neuro: R sided hemiparesis, L sided weakness, follows commands, showed him \"RN call button\" but needed reminding 1 minute later.   CV: RRR, S1S2 normal, no murmurs, rubs, or gallops.   Pulm: CTA, no rhonchi or wheezes  Abd: nondistended, soft, non-tender, no guarding  Ext: no peripheral edema  Incision: clean, dry, intact, no erythema  Chest Tube sites: scabbed, clean and dry      Labs:  [facility]    Recent Labs  Lab 12/31/17  0705 12/30/17  0703 12/29/17  0732 12/28/17  0727    142 139 139   POTASSIUM 3.5 3.7 3.6 4.2   CHLORIDE 107 108 105 104   IZABELLA 9.3 9.3 9.4 9.5   CO2 25 24 26 26   BUN 28 34* 33* 38*   CR 0.86 0.93 0.83 0.84   * 121* 118* 116*     CBC    Recent Labs  Lab 12/31/17  0705 12/30/17  0703 12/29/17  0732 12/28/17  0727   WBC 11.9* 12.5* 13.6* 14.0*   RBC 2.85* 2.97* 3.08* 2.96*   HGB 8.8* 9.1* 9.4* 9.0*   HCT 29.6* 30.3* 31.3* " 30.1*   * 102* 102* 102*   MCH 30.9 30.6 30.5 30.4   MCHC 29.7* 30.0* 30.0* 29.9*   RDW 18.7* 18.6* 18.5* 18.7*   * 492* 526* 477*     INR  No lab results found in last 7 days.   Hepatic Panel   Lab Results   Component Value Date    AST 32 12/18/2017     Lab Results   Component Value Date    ALT 30 12/18/2017     Lab Results   Component Value Date    ALBUMIN 1.5 12/18/2017     GLUCOSE:     Recent Labs  Lab 12/31/17  0705 12/30/17  0703 12/29/17  2150 12/29/17  1703 12/29/17  1429 12/29/17  0732 12/28/17  2115 12/28/17  1914 12/28/17  1109 12/28/17  0727  12/27/17  0430  12/26/17  0623   * 121*  --   --   --  118*  --   --   --  116*  --  159*  --  128*   BGM  --   --  120* 167* 127*  --  148* 163* 135*  --   < >  --   < >  --    < > = values in this interval not displayed.      Imaging:  reviewed recent imaging      ASSESSMENT: Cricket Miguel is a 62 year old male with hx of COPD, HTN, HLD, CAD, heart failure, aortic stenosis and ascending aortic aneurysm s/p aortic valve replacement and aortic root replacement (April 2016) c/b moises-aortic abscess, endocarditis, cerebral septic emboli and severe AI s/p redo-sternotomy aortic root and valve homograft 12/19. Post op EF 20-25%.        PLAN:   Neuro/ pain/ sedation: Hx of multifocal acute infarctions involving the left parietotemporal lobes, left cerebral hemisphere and right occipital lobe presumed to be septic emboli. Depression, mild delirium, followed by psych.  -Monitor neurological status. Notify the MD for any acute changes in exam.  -Dilaudid PRN.  -Mental status improving.  - On psych rec started olanzapine. Agitation improving. Serial EKGs to evaluate QT interval. QTc today 479. Psych recommended decreasing zyprexa given increased flat affect and delayed responses, however much more anxious 12/27 so dose kept at 5 mg BID. Doing better today with both anxiety and affect. Contine current dosing.      Pulmonary care: extubated 12/20  - On  facemask 2 LPM saturation 100%.  - Encourage pulmonary toilet  - Nasotracheal suctioning  - Removed right chest tube 12/26   - Left pleural tube clamping trial okay, removed 12/27       Cardiovascular:    -Hx HTN, HLD, CAD, AS and aortic aneurysm s/p bentall c/b endocarditis and severe AI s/p aortic root/valve homograft 12/19.  -Keep SBP<120  - Cardiology recently started losartan 25 mg po bid. Pressure a little soft and patient reported being a little lightheaded. Gave 100 ml fluid back with improved BP. May need to reduce Losartan dosing if not tolerating but patient appeared to be dry this AM.    - Coreg 3.125 mg bid since 12/28  - ASA 81 mg and atorvastatin.  - Echo shows EF 20-25% baseline 30%   - No bradycardia or pauses, had been in NSR, TPW removed 12/26.        GI care:   - PEG  - famotidine  - senna/colace/miralax, +BM, cdiff negative 12/26       Fluids/ Electrolytes/ Nutrition:   -PRN electrolyte replacement  -Bolus tube feeds through PEG, dajuan counts     Renal/ Fluid Balance:   - Espinoza catheter for strict intake and output.  - Monitor BMP, creatinine  - IV lasix transitioned to Bumex 2 mg PO BID on 12/26, evening dose held on 12/29 due to hypotension and lightheadedness. Stable Bumex 1 mg po bid since 12/30.        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 Hx of MSSA septic arthritis and bacteremia. Hx of multifocal acute infarctions involving the left parietotemporal lobes, left cerebral hemisphere and right occipital lobe presumed to be septic emboli. ID following.   -New cultures BAL growing pseudomonas aeruginosa. ID following. Continue nafcillin/cefepime/rifampin per ID. WBC 11.9 trending down.   - Fluconazole for oral thrush (7 days) 12/30      ID rec's from 12/26  - restart PTA nafcillin 2g IV q4h, previously planned duration until 1/7/18  - continue rifampin 300mg PO BID, previously planned duration until 1/7/18  -continue  cefepime for 2 total weeks duration for pseudomonas pneumonia (end date 1/3/18)  -F/u surgical tissue cultures       Heme:   - Acute blood loss anemia  - Hgb stable.        Prophylaxis:    -Mechanical prophylaxis for DVT.   -Heparin TID.       Lines/ tubes/ drains:  -R triple lumen PICC, garza, Mary       Disposition:  -Floor since 12/23   -Recommending discharge to TCU when bed available, early next week.       Discussed with CVTS Fellow   Staff surgeons have been informed of changes through both  verbal and written communication.      Deny Trimble PA-C  Cardiothoracic Surgery  Pager [phone]    9:31 AM   December 31, 2017

## 2018-01-01 ENCOUNTER — TRANSFERRED RECORDS (OUTPATIENT)
Dept: HEALTH INFORMATION MANAGEMENT | Facility: CLINIC | Age: 63
End: 2018-01-01

## 2018-01-01 ENCOUNTER — OFFICE VISIT (OUTPATIENT)
Dept: CARDIOLOGY | Facility: CLINIC | Age: 63
End: 2018-01-01
Attending: NURSE PRACTITIONER
Payer: MEDICARE

## 2018-01-01 ENCOUNTER — TELEPHONE (OUTPATIENT)
Dept: FAMILY MEDICINE | Facility: CLINIC | Age: 63
End: 2018-01-01

## 2018-01-01 ENCOUNTER — APPOINTMENT (OUTPATIENT)
Dept: GENERAL RADIOLOGY | Facility: CLINIC | Age: 63
DRG: 304 | End: 2018-01-01
Attending: PHYSICIAN ASSISTANT
Payer: MEDICARE

## 2018-01-01 ENCOUNTER — INFUSION THERAPY VISIT (OUTPATIENT)
Dept: INFUSION THERAPY | Facility: CLINIC | Age: 63
End: 2018-01-01
Attending: NURSE PRACTITIONER
Payer: MEDICARE

## 2018-01-01 ENCOUNTER — MEDICAL CORRESPONDENCE (OUTPATIENT)
Dept: HEALTH INFORMATION MANAGEMENT | Facility: CLINIC | Age: 63
End: 2018-01-01

## 2018-01-01 ENCOUNTER — OFFICE VISIT (OUTPATIENT)
Dept: CARDIOLOGY | Facility: CLINIC | Age: 63
End: 2018-01-01
Payer: MEDICARE

## 2018-01-01 ENCOUNTER — CARE COORDINATION (OUTPATIENT)
Dept: CARDIOLOGY | Facility: CLINIC | Age: 63
End: 2018-01-01

## 2018-01-01 ENCOUNTER — PATIENT OUTREACH (OUTPATIENT)
Dept: CARE COORDINATION | Facility: CLINIC | Age: 63
End: 2018-01-01

## 2018-01-01 ENCOUNTER — PRE VISIT (OUTPATIENT)
Dept: CARDIOLOGY | Facility: CLINIC | Age: 63
End: 2018-01-01

## 2018-01-01 ENCOUNTER — OFFICE VISIT (OUTPATIENT)
Dept: FAMILY MEDICINE | Facility: CLINIC | Age: 63
End: 2018-01-01
Payer: MEDICARE

## 2018-01-01 ENCOUNTER — TELEPHONE (OUTPATIENT)
Dept: CARDIOLOGY | Facility: CLINIC | Age: 63
End: 2018-01-01

## 2018-01-01 ENCOUNTER — APPOINTMENT (OUTPATIENT)
Dept: CARDIOLOGY | Facility: CLINIC | Age: 63
DRG: 304 | End: 2018-01-01
Attending: INTERNAL MEDICINE
Payer: MEDICARE

## 2018-01-01 ENCOUNTER — HOSPITAL ENCOUNTER (INPATIENT)
Facility: CLINIC | Age: 63
LOS: 1 days | Discharge: HOME | DRG: 920 | End: 2018-03-11
Attending: INTERNAL MEDICINE | Admitting: INTERNAL MEDICINE
Payer: COMMERCIAL

## 2018-01-01 ENCOUNTER — MYC MEDICAL ADVICE (OUTPATIENT)
Dept: FAMILY MEDICINE | Facility: CLINIC | Age: 63
End: 2018-01-01

## 2018-01-01 ENCOUNTER — HOSPITAL ENCOUNTER (OUTPATIENT)
Dept: SPEECH THERAPY | Facility: CLINIC | Age: 63
Setting detail: THERAPIES SERIES
End: 2018-03-23
Attending: FAMILY MEDICINE
Payer: COMMERCIAL

## 2018-01-01 ENCOUNTER — NURSING HOME VISIT (OUTPATIENT)
Dept: GERIATRICS | Facility: CLINIC | Age: 63
End: 2018-01-01
Payer: COMMERCIAL

## 2018-01-01 ENCOUNTER — OFFICE VISIT (OUTPATIENT)
Dept: CARDIOLOGY | Facility: CLINIC | Age: 63
End: 2018-01-01
Attending: INTERNAL MEDICINE
Payer: MEDICARE

## 2018-01-01 ENCOUNTER — MYC MEDICAL ADVICE (OUTPATIENT)
Dept: CARDIOLOGY | Facility: CLINIC | Age: 63
End: 2018-01-01

## 2018-01-01 ENCOUNTER — APPOINTMENT (OUTPATIENT)
Dept: GENERAL RADIOLOGY | Facility: CLINIC | Age: 63
DRG: 304 | End: 2018-01-01
Attending: EMERGENCY MEDICINE
Payer: MEDICARE

## 2018-01-01 ENCOUNTER — OFFICE VISIT (OUTPATIENT)
Dept: FAMILY MEDICINE | Facility: CLINIC | Age: 63
End: 2018-01-01
Payer: COMMERCIAL

## 2018-01-01 ENCOUNTER — HOSPITAL LABORATORY (OUTPATIENT)
Dept: OTHER | Facility: CLINIC | Age: 63
End: 2018-01-01

## 2018-01-01 ENCOUNTER — HOSPITAL ENCOUNTER (OUTPATIENT)
Dept: SPEECH THERAPY | Facility: CLINIC | Age: 63
Setting detail: THERAPIES SERIES
End: 2018-05-29
Attending: FAMILY MEDICINE
Payer: COMMERCIAL

## 2018-01-01 ENCOUNTER — HOSPITAL ENCOUNTER (EMERGENCY)
Facility: CLINIC | Age: 63
Discharge: ANOTHER HOSPITAL | End: 2018-03-10
Attending: EMERGENCY MEDICINE | Admitting: HOSPITALIST
Payer: COMMERCIAL

## 2018-01-01 ENCOUNTER — HOSPITAL ENCOUNTER (OUTPATIENT)
Dept: SPEECH THERAPY | Facility: CLINIC | Age: 63
Setting detail: THERAPIES SERIES
End: 2018-04-11
Attending: FAMILY MEDICINE
Payer: COMMERCIAL

## 2018-01-01 ENCOUNTER — HOSPITAL ENCOUNTER (OUTPATIENT)
Dept: MRI IMAGING | Facility: CLINIC | Age: 63
Discharge: HOME | End: 2018-06-05
Attending: UROLOGY | Admitting: UROLOGY
Payer: MEDICARE

## 2018-01-01 ENCOUNTER — HOSPITAL ENCOUNTER (EMERGENCY)
Facility: CLINIC | Age: 63
Discharge: HOME | End: 2018-05-07
Attending: EMERGENCY MEDICINE | Admitting: EMERGENCY MEDICINE
Payer: COMMERCIAL

## 2018-01-01 ENCOUNTER — APPOINTMENT (OUTPATIENT)
Dept: CT IMAGING | Facility: CLINIC | Age: 63
End: 2018-01-01
Attending: EMERGENCY MEDICINE
Payer: COMMERCIAL

## 2018-01-01 ENCOUNTER — HOSPITAL ENCOUNTER (OUTPATIENT)
Dept: LAB | Facility: CLINIC | Age: 63
Discharge: HOME | End: 2018-01-31
Attending: FAMILY MEDICINE | Admitting: FAMILY MEDICINE
Payer: COMMERCIAL

## 2018-01-01 ENCOUNTER — OFFICE VISIT (OUTPATIENT)
Dept: NEUROLOGY | Facility: CLINIC | Age: 63
End: 2018-01-01
Payer: MEDICARE

## 2018-01-01 ENCOUNTER — HOSPITAL ENCOUNTER (OUTPATIENT)
Dept: SPEECH THERAPY | Facility: CLINIC | Age: 63
Setting detail: THERAPIES SERIES
End: 2018-04-04
Attending: FAMILY MEDICINE
Payer: COMMERCIAL

## 2018-01-01 ENCOUNTER — HOSPITAL ENCOUNTER (OUTPATIENT)
Dept: PHYSICAL THERAPY | Facility: CLINIC | Age: 63
Setting detail: THERAPIES SERIES
End: 2018-03-20
Attending: FAMILY MEDICINE
Payer: COMMERCIAL

## 2018-01-01 ENCOUNTER — OFFICE VISIT (OUTPATIENT)
Dept: CARDIOLOGY | Facility: CLINIC | Age: 63
End: 2018-01-01
Attending: NURSE PRACTITIONER
Payer: COMMERCIAL

## 2018-01-01 ENCOUNTER — APPOINTMENT (OUTPATIENT)
Dept: SPEECH THERAPY | Facility: CLINIC | Age: 63
DRG: 219 | End: 2018-01-01
Attending: INTERNAL MEDICINE
Payer: COMMERCIAL

## 2018-01-01 ENCOUNTER — TELEPHONE (OUTPATIENT)
Dept: OTHER | Facility: CLINIC | Age: 63
End: 2018-01-01

## 2018-01-01 ENCOUNTER — HOSPITAL ENCOUNTER (OUTPATIENT)
Dept: SPEECH THERAPY | Facility: CLINIC | Age: 63
Setting detail: THERAPIES SERIES
End: 2018-03-16
Attending: FAMILY MEDICINE
Payer: COMMERCIAL

## 2018-01-01 ENCOUNTER — APPOINTMENT (OUTPATIENT)
Dept: ULTRASOUND IMAGING | Facility: CLINIC | Age: 63
End: 2018-01-01
Attending: EMERGENCY MEDICINE
Payer: COMMERCIAL

## 2018-01-01 ENCOUNTER — HOSPITAL ENCOUNTER (OUTPATIENT)
Dept: SPEECH THERAPY | Facility: CLINIC | Age: 63
Setting detail: THERAPIES SERIES
End: 2018-04-23
Attending: FAMILY MEDICINE
Payer: COMMERCIAL

## 2018-01-01 ENCOUNTER — HOSPITAL ENCOUNTER (OUTPATIENT)
Dept: SPEECH THERAPY | Facility: CLINIC | Age: 63
Setting detail: THERAPIES SERIES
End: 2018-04-02
Attending: FAMILY MEDICINE
Payer: COMMERCIAL

## 2018-01-01 ENCOUNTER — CARE COORDINATION (OUTPATIENT)
Dept: CARE COORDINATION | Facility: CLINIC | Age: 63
End: 2018-01-01

## 2018-01-01 ENCOUNTER — ALLIED HEALTH/NURSE VISIT (OUTPATIENT)
Dept: NURSING | Facility: CLINIC | Age: 63
End: 2018-01-01
Payer: COMMERCIAL

## 2018-01-01 ENCOUNTER — HOSPITAL ENCOUNTER (INPATIENT)
Facility: CLINIC | Age: 63
LOS: 4 days | Discharge: HOSPICE | DRG: 304 | End: 2018-12-13
Attending: EMERGENCY MEDICINE | Admitting: INTERNAL MEDICINE
Payer: MEDICARE

## 2018-01-01 ENCOUNTER — APPOINTMENT (OUTPATIENT)
Dept: CT IMAGING | Facility: CLINIC | Age: 63
DRG: 304 | End: 2018-01-01
Attending: PHYSICIAN ASSISTANT
Payer: MEDICARE

## 2018-01-01 ENCOUNTER — PRE VISIT (OUTPATIENT)
Dept: NEUROLOGY | Facility: CLINIC | Age: 63
End: 2018-01-01

## 2018-01-01 ENCOUNTER — HOSPITAL ENCOUNTER (OUTPATIENT)
Dept: SPEECH THERAPY | Facility: CLINIC | Age: 63
Setting detail: THERAPIES SERIES
End: 2018-04-09
Attending: FAMILY MEDICINE
Payer: COMMERCIAL

## 2018-01-01 ENCOUNTER — APPOINTMENT (OUTPATIENT)
Dept: CT IMAGING | Facility: CLINIC | Age: 63
DRG: 304 | End: 2018-01-01
Attending: EMERGENCY MEDICINE
Payer: MEDICARE

## 2018-01-01 ENCOUNTER — INFUSION THERAPY VISIT (OUTPATIENT)
Dept: INFUSION THERAPY | Facility: CLINIC | Age: 63
End: 2018-01-01
Attending: INTERNAL MEDICINE
Payer: COMMERCIAL

## 2018-01-01 ENCOUNTER — INFUSION THERAPY VISIT (OUTPATIENT)
Dept: INFUSION THERAPY | Facility: CLINIC | Age: 63
End: 2018-01-01
Attending: INTERNAL MEDICINE
Payer: MEDICARE

## 2018-01-01 ENCOUNTER — HOSPITAL ENCOUNTER (OUTPATIENT)
Dept: SPEECH THERAPY | Facility: CLINIC | Age: 63
Setting detail: THERAPIES SERIES
End: 2018-04-20
Attending: FAMILY MEDICINE
Payer: COMMERCIAL

## 2018-01-01 ENCOUNTER — APPOINTMENT (OUTPATIENT)
Dept: CARDIOLOGY | Facility: CLINIC | Age: 63
DRG: 920 | End: 2018-01-01
Attending: INTERNAL MEDICINE
Payer: COMMERCIAL

## 2018-01-01 ENCOUNTER — HOSPITAL ENCOUNTER (OUTPATIENT)
Dept: SPEECH THERAPY | Facility: CLINIC | Age: 63
Setting detail: THERAPIES SERIES
End: 2018-04-18
Attending: FAMILY MEDICINE
Payer: COMMERCIAL

## 2018-01-01 ENCOUNTER — DOCUMENTATION ONLY (OUTPATIENT)
Dept: OTHER | Facility: CLINIC | Age: 63
End: 2018-01-01

## 2018-01-01 ENCOUNTER — HOSPITAL ENCOUNTER (OUTPATIENT)
Dept: SPEECH THERAPY | Facility: CLINIC | Age: 63
Setting detail: THERAPIES SERIES
End: 2018-05-22
Attending: FAMILY MEDICINE
Payer: COMMERCIAL

## 2018-01-01 ENCOUNTER — TELEPHONE (OUTPATIENT)
Dept: NURSING | Facility: CLINIC | Age: 63
End: 2018-01-01

## 2018-01-01 ENCOUNTER — NURSE TRIAGE (OUTPATIENT)
Dept: NURSING | Facility: CLINIC | Age: 63
End: 2018-01-01

## 2018-01-01 ENCOUNTER — HOSPITAL ENCOUNTER (OUTPATIENT)
Dept: SPEECH THERAPY | Facility: CLINIC | Age: 63
Setting detail: THERAPIES SERIES
End: 2018-03-28
Attending: FAMILY MEDICINE
Payer: COMMERCIAL

## 2018-01-01 ENCOUNTER — APPOINTMENT (OUTPATIENT)
Dept: GENERAL RADIOLOGY | Facility: CLINIC | Age: 63
End: 2018-01-01
Attending: EMERGENCY MEDICINE
Payer: COMMERCIAL

## 2018-01-01 ENCOUNTER — DOCUMENTATION ONLY (OUTPATIENT)
Dept: CARE COORDINATION | Facility: CLINIC | Age: 63
End: 2018-01-01

## 2018-01-01 ENCOUNTER — HOSPITAL ENCOUNTER (OUTPATIENT)
Dept: SPEECH THERAPY | Facility: CLINIC | Age: 63
Setting detail: THERAPIES SERIES
End: 2018-05-25
Attending: FAMILY MEDICINE
Payer: COMMERCIAL

## 2018-01-01 ENCOUNTER — DISCHARGE SUMMARY NURSING HOME (OUTPATIENT)
Dept: GERIATRICS | Facility: CLINIC | Age: 63
End: 2018-01-01
Payer: COMMERCIAL

## 2018-01-01 ENCOUNTER — TELEPHONE (OUTPATIENT)
Dept: NEUROLOGY | Facility: CLINIC | Age: 63
End: 2018-01-01

## 2018-01-01 ENCOUNTER — OFFICE VISIT (OUTPATIENT)
Dept: UROLOGY | Facility: CLINIC | Age: 63
End: 2018-01-01
Payer: COMMERCIAL

## 2018-01-01 VITALS
HEIGHT: 68 IN | HEART RATE: 66 BPM | BODY MASS INDEX: 27.58 KG/M2 | WEIGHT: 182 LBS | DIASTOLIC BLOOD PRESSURE: 80 MMHG | SYSTOLIC BLOOD PRESSURE: 100 MMHG | OXYGEN SATURATION: 95 % | TEMPERATURE: 97.1 F

## 2018-01-01 VITALS
WEIGHT: 182 LBS | HEART RATE: 69 BPM | DIASTOLIC BLOOD PRESSURE: 66 MMHG | OXYGEN SATURATION: 93 % | HEIGHT: 68 IN | TEMPERATURE: 98 F | SYSTOLIC BLOOD PRESSURE: 104 MMHG | BODY MASS INDEX: 27.58 KG/M2

## 2018-01-01 VITALS
BODY MASS INDEX: 27.94 KG/M2 | HEIGHT: 67 IN | SYSTOLIC BLOOD PRESSURE: 128 MMHG | HEART RATE: 68 BPM | DIASTOLIC BLOOD PRESSURE: 64 MMHG | WEIGHT: 178 LBS

## 2018-01-01 VITALS
DIASTOLIC BLOOD PRESSURE: 75 MMHG | SYSTOLIC BLOOD PRESSURE: 119 MMHG | OXYGEN SATURATION: 95 % | TEMPERATURE: 99.6 F | RESPIRATION RATE: 18 BRPM

## 2018-01-01 VITALS
OXYGEN SATURATION: 96 % | SYSTOLIC BLOOD PRESSURE: 129 MMHG | HEART RATE: 85 BPM | RESPIRATION RATE: 18 BRPM | DIASTOLIC BLOOD PRESSURE: 87 MMHG | TEMPERATURE: 98.5 F

## 2018-01-01 VITALS
OXYGEN SATURATION: 96 % | HEIGHT: 68 IN | BODY MASS INDEX: 27.13 KG/M2 | WEIGHT: 179 LBS | TEMPERATURE: 97.5 F | SYSTOLIC BLOOD PRESSURE: 120 MMHG | DIASTOLIC BLOOD PRESSURE: 76 MMHG | HEART RATE: 68 BPM

## 2018-01-01 VITALS
HEART RATE: 62 BPM | HEIGHT: 67 IN | SYSTOLIC BLOOD PRESSURE: 100 MMHG | DIASTOLIC BLOOD PRESSURE: 74 MMHG | OXYGEN SATURATION: 93 %

## 2018-01-01 VITALS
WEIGHT: 173 LBS | DIASTOLIC BLOOD PRESSURE: 84 MMHG | BODY MASS INDEX: 27.15 KG/M2 | SYSTOLIC BLOOD PRESSURE: 130 MMHG | HEIGHT: 67 IN | TEMPERATURE: 97.5 F | OXYGEN SATURATION: 96 % | HEART RATE: 65 BPM

## 2018-01-01 VITALS
RESPIRATION RATE: 13 BRPM | DIASTOLIC BLOOD PRESSURE: 77 MMHG | SYSTOLIC BLOOD PRESSURE: 114 MMHG | TEMPERATURE: 97.6 F | WEIGHT: 185.85 LBS | HEART RATE: 60 BPM | HEIGHT: 68 IN | OXYGEN SATURATION: 96 % | BODY MASS INDEX: 28.17 KG/M2

## 2018-01-01 VITALS
SYSTOLIC BLOOD PRESSURE: 123 MMHG | HEART RATE: 61 BPM | TEMPERATURE: 97.3 F | RESPIRATION RATE: 16 BRPM | DIASTOLIC BLOOD PRESSURE: 93 MMHG

## 2018-01-01 VITALS — HEART RATE: 62 BPM | SYSTOLIC BLOOD PRESSURE: 94 MMHG | DIASTOLIC BLOOD PRESSURE: 67 MMHG | HEIGHT: 68 IN

## 2018-01-01 VITALS
HEART RATE: 69 BPM | DIASTOLIC BLOOD PRESSURE: 98 MMHG | HEIGHT: 68 IN | OXYGEN SATURATION: 94 % | TEMPERATURE: 97.4 F | WEIGHT: 175.9 LBS | BODY MASS INDEX: 26.66 KG/M2 | RESPIRATION RATE: 16 BRPM | SYSTOLIC BLOOD PRESSURE: 136 MMHG

## 2018-01-01 VITALS
BODY MASS INDEX: 27.94 KG/M2 | SYSTOLIC BLOOD PRESSURE: 120 MMHG | TEMPERATURE: 98 F | HEIGHT: 67 IN | OXYGEN SATURATION: 93 % | WEIGHT: 178 LBS | HEART RATE: 61 BPM | DIASTOLIC BLOOD PRESSURE: 68 MMHG

## 2018-01-01 VITALS
HEIGHT: 68 IN | TEMPERATURE: 98.5 F | DIASTOLIC BLOOD PRESSURE: 74 MMHG | RESPIRATION RATE: 16 BRPM | SYSTOLIC BLOOD PRESSURE: 106 MMHG | WEIGHT: 165 LBS | OXYGEN SATURATION: 94 % | BODY MASS INDEX: 25.01 KG/M2 | HEART RATE: 60 BPM

## 2018-01-01 VITALS
HEIGHT: 67 IN | OXYGEN SATURATION: 95 % | HEART RATE: 67 BPM | TEMPERATURE: 97.4 F | DIASTOLIC BLOOD PRESSURE: 63 MMHG | SYSTOLIC BLOOD PRESSURE: 91 MMHG

## 2018-01-01 VITALS
OXYGEN SATURATION: 98 % | WEIGHT: 184 LBS | TEMPERATURE: 97 F | BODY MASS INDEX: 28.88 KG/M2 | SYSTOLIC BLOOD PRESSURE: 154 MMHG | DIASTOLIC BLOOD PRESSURE: 99 MMHG | HEART RATE: 57 BPM | HEIGHT: 67 IN

## 2018-01-01 VITALS
OXYGEN SATURATION: 94 % | TEMPERATURE: 97.2 F | HEART RATE: 62 BPM | DIASTOLIC BLOOD PRESSURE: 74 MMHG | WEIGHT: 178.3 LBS | SYSTOLIC BLOOD PRESSURE: 118 MMHG | BODY MASS INDEX: 27.99 KG/M2 | HEIGHT: 67 IN

## 2018-01-01 VITALS
HEIGHT: 67 IN | DIASTOLIC BLOOD PRESSURE: 62 MMHG | WEIGHT: 180.7 LBS | SYSTOLIC BLOOD PRESSURE: 112 MMHG | OXYGEN SATURATION: 96 % | BODY MASS INDEX: 28.36 KG/M2 | HEART RATE: 58 BPM

## 2018-01-01 VITALS
DIASTOLIC BLOOD PRESSURE: 70 MMHG | TEMPERATURE: 98.8 F | WEIGHT: 182.98 LBS | HEIGHT: 68 IN | BODY MASS INDEX: 27.73 KG/M2 | OXYGEN SATURATION: 92 % | SYSTOLIC BLOOD PRESSURE: 106 MMHG | RESPIRATION RATE: 18 BRPM

## 2018-01-01 VITALS
DIASTOLIC BLOOD PRESSURE: 87 MMHG | HEART RATE: 55 BPM | TEMPERATURE: 97.1 F | OXYGEN SATURATION: 93 % | RESPIRATION RATE: 16 BRPM | SYSTOLIC BLOOD PRESSURE: 126 MMHG

## 2018-01-01 VITALS
HEART RATE: 88 BPM | DIASTOLIC BLOOD PRESSURE: 92 MMHG | BODY MASS INDEX: 28.83 KG/M2 | SYSTOLIC BLOOD PRESSURE: 143 MMHG | OXYGEN SATURATION: 95 % | WEIGHT: 183.7 LBS | HEIGHT: 67 IN

## 2018-01-01 VITALS
SYSTOLIC BLOOD PRESSURE: 106 MMHG | RESPIRATION RATE: 16 BRPM | TEMPERATURE: 98 F | DIASTOLIC BLOOD PRESSURE: 71 MMHG | HEART RATE: 50 BPM

## 2018-01-01 VITALS — DIASTOLIC BLOOD PRESSURE: 81 MMHG | HEART RATE: 66 BPM | SYSTOLIC BLOOD PRESSURE: 115 MMHG

## 2018-01-01 VITALS
OXYGEN SATURATION: 96 % | DIASTOLIC BLOOD PRESSURE: 79 MMHG | HEART RATE: 61 BPM | HEIGHT: 67 IN | SYSTOLIC BLOOD PRESSURE: 111 MMHG

## 2018-01-01 DIAGNOSIS — E53.8 VITAMIN B12 DEFICIENCY (NON ANEMIC): ICD-10-CM

## 2018-01-01 DIAGNOSIS — J44.9 COPD, MILD (H): ICD-10-CM

## 2018-01-01 DIAGNOSIS — R19.7 DIARRHEA: Primary | ICD-10-CM

## 2018-01-01 DIAGNOSIS — E87.6 HYPOKALEMIA: ICD-10-CM

## 2018-01-01 DIAGNOSIS — I50.22 CHRONIC SYSTOLIC CONGESTIVE HEART FAILURE (H): ICD-10-CM

## 2018-01-01 DIAGNOSIS — R19.7 DIARRHEA: ICD-10-CM

## 2018-01-01 DIAGNOSIS — I10 HYPERTENSION GOAL BP (BLOOD PRESSURE) < 140/90: ICD-10-CM

## 2018-01-01 DIAGNOSIS — S20.212A HEMATOMA OF LEFT CHEST WALL, INITIAL ENCOUNTER: Primary | ICD-10-CM

## 2018-01-01 DIAGNOSIS — Z98.890 H/O AORTIC ROOT REPAIR: ICD-10-CM

## 2018-01-01 DIAGNOSIS — I50.9 CONGESTIVE HEART FAILURE, NYHA CLASS 2, UNSPECIFIED CONGESTIVE HEART FAILURE TYPE (H): ICD-10-CM

## 2018-01-01 DIAGNOSIS — I39 ENDOCARDITIS AND HEART VALVE DISORDERS IN DISEASES CLASSIFIED ELSEWHERE: Primary | ICD-10-CM

## 2018-01-01 DIAGNOSIS — R11.0 NAUSEA: ICD-10-CM

## 2018-01-01 DIAGNOSIS — Z23 NEED FOR PROPHYLACTIC VACCINATION AND INOCULATION AGAINST INFLUENZA: ICD-10-CM

## 2018-01-01 DIAGNOSIS — R73.09 ELEVATED GLUCOSE: ICD-10-CM

## 2018-01-01 DIAGNOSIS — I63.9 CEREBROVASCULAR ACCIDENT (CVA), UNSPECIFIED MECHANISM (H): Primary | ICD-10-CM

## 2018-01-01 DIAGNOSIS — I50.22 CHRONIC SYSTOLIC HEART FAILURE (H): ICD-10-CM

## 2018-01-01 DIAGNOSIS — I63.9 CEREBROVASCULAR ACCIDENT (CVA), UNSPECIFIED MECHANISM (H): ICD-10-CM

## 2018-01-01 DIAGNOSIS — H02.106 ECTROPION DUE TO LAXITY OF EYELID, LEFT: ICD-10-CM

## 2018-01-01 DIAGNOSIS — F41.9 ANXIETY: ICD-10-CM

## 2018-01-01 DIAGNOSIS — Z00.00 ROUTINE GENERAL MEDICAL EXAMINATION AT A HEALTH CARE FACILITY: ICD-10-CM

## 2018-01-01 DIAGNOSIS — R97.20 ELEVATED PROSTATE SPECIFIC ANTIGEN (PSA): Primary | ICD-10-CM

## 2018-01-01 DIAGNOSIS — J44.9 CHRONIC OBSTRUCTIVE PULMONARY DISEASE, UNSPECIFIED COPD TYPE (H): ICD-10-CM

## 2018-01-01 DIAGNOSIS — K12.0 APHTHOUS ULCER OF MOUTH: Primary | ICD-10-CM

## 2018-01-01 DIAGNOSIS — Z86.79 HISTORY OF ENDOCARDITIS: ICD-10-CM

## 2018-01-01 DIAGNOSIS — R13.10 TROUBLE SWALLOWING: Primary | ICD-10-CM

## 2018-01-01 DIAGNOSIS — Z79.2 PROPHYLACTIC ANTIBIOTIC: Primary | ICD-10-CM

## 2018-01-01 DIAGNOSIS — Z86.19 HISTORY OF CLOSTRIDIUM DIFFICILE INFECTION: ICD-10-CM

## 2018-01-01 DIAGNOSIS — E87.6 HYPOKALEMIA: Primary | ICD-10-CM

## 2018-01-01 DIAGNOSIS — R47.89 GARBLED SPEECH: ICD-10-CM

## 2018-01-01 DIAGNOSIS — I33.0 ACUTE BACTERIAL ENDOCARDITIS: Primary | ICD-10-CM

## 2018-01-01 DIAGNOSIS — R07.9 ACUTE CHEST PAIN: ICD-10-CM

## 2018-01-01 DIAGNOSIS — I33.0 ACUTE BACTERIAL ENDOCARDITIS: ICD-10-CM

## 2018-01-01 DIAGNOSIS — Z12.11 SPECIAL SCREENING FOR MALIGNANT NEOPLASMS, COLON: ICD-10-CM

## 2018-01-01 DIAGNOSIS — Z95.2 S/P AVR (AORTIC VALVE REPLACEMENT): ICD-10-CM

## 2018-01-01 DIAGNOSIS — Z51.81 MEDICATION MONITORING ENCOUNTER: ICD-10-CM

## 2018-01-01 DIAGNOSIS — I63.89 CEREBROVASCULAR ACCIDENT (CVA) DUE TO OTHER MECHANISM (H): ICD-10-CM

## 2018-01-01 DIAGNOSIS — H90.3 SENSORINEURAL HEARING LOSS (SNHL) OF BOTH EARS: ICD-10-CM

## 2018-01-01 DIAGNOSIS — I50.9 CONGESTIVE HEART FAILURE, UNSPECIFIED HF CHRONICITY, UNSPECIFIED HEART FAILURE TYPE (H): ICD-10-CM

## 2018-01-01 DIAGNOSIS — I42.9 CARDIOMYOPATHY, UNSPECIFIED TYPE (H): ICD-10-CM

## 2018-01-01 DIAGNOSIS — I77.810 AORTIC ROOT DILATATION (H): ICD-10-CM

## 2018-01-01 DIAGNOSIS — J06.9 UPPER RESPIRATORY TRACT INFECTION, UNSPECIFIED TYPE: Primary | ICD-10-CM

## 2018-01-01 DIAGNOSIS — G51.0 FACIAL PARALYSIS: ICD-10-CM

## 2018-01-01 DIAGNOSIS — F32.1 MAJOR DEPRESSIVE DISORDER, SINGLE EPISODE, MODERATE (H): ICD-10-CM

## 2018-01-01 DIAGNOSIS — G93.40 ENCEPHALOPATHY: Primary | ICD-10-CM

## 2018-01-01 DIAGNOSIS — R53.1 WEAKNESS: ICD-10-CM

## 2018-01-01 DIAGNOSIS — I42.9 CARDIOMYOPATHY (H): ICD-10-CM

## 2018-01-01 DIAGNOSIS — R13.12 OROPHARYNGEAL DYSPHAGIA: ICD-10-CM

## 2018-01-01 DIAGNOSIS — I63.413 CEREBRAL INFARCTION DUE TO BILATERAL EMBOLISM OF MIDDLE CEREBRAL ARTERIES (H): Primary | ICD-10-CM

## 2018-01-01 DIAGNOSIS — E55.9 VITAMIN D DEFICIENCY: ICD-10-CM

## 2018-01-01 DIAGNOSIS — I33.0 ABSCESS OF AORTIC ROOT: ICD-10-CM

## 2018-01-01 DIAGNOSIS — E03.9 HYPOTHYROIDISM: ICD-10-CM

## 2018-01-01 DIAGNOSIS — I63.9 CVA (CEREBRAL VASCULAR ACCIDENT) (H): Primary | ICD-10-CM

## 2018-01-01 DIAGNOSIS — F05 DELIRIUM DUE TO ANOTHER MEDICAL CONDITION: ICD-10-CM

## 2018-01-01 DIAGNOSIS — I10 HYPERTENSION GOAL BP (BLOOD PRESSURE) < 140/90: Primary | ICD-10-CM

## 2018-01-01 DIAGNOSIS — J98.4 DISEASE OF LUNG: Primary | ICD-10-CM

## 2018-01-01 DIAGNOSIS — Z12.5 SCREENING FOR PROSTATE CANCER: ICD-10-CM

## 2018-01-01 DIAGNOSIS — I63.9 CVA (CEREBRAL VASCULAR ACCIDENT) (H): ICD-10-CM

## 2018-01-01 DIAGNOSIS — G93.40 ENCEPHALOPATHY: ICD-10-CM

## 2018-01-01 DIAGNOSIS — R53.81 PHYSICAL DECONDITIONING: ICD-10-CM

## 2018-01-01 DIAGNOSIS — R11.2 INTRACTABLE VOMITING WITH NAUSEA, UNSPECIFIED VOMITING TYPE: ICD-10-CM

## 2018-01-01 DIAGNOSIS — I35.1: ICD-10-CM

## 2018-01-01 DIAGNOSIS — D64.9 ANEMIA: Primary | ICD-10-CM

## 2018-01-01 DIAGNOSIS — Z11.59 NEED FOR HEPATITIS C SCREENING TEST: ICD-10-CM

## 2018-01-01 DIAGNOSIS — F17.200 TOBACCO USE DISORDER: ICD-10-CM

## 2018-01-01 DIAGNOSIS — F32.0 MILD SINGLE CURRENT EPISODE OF MAJOR DEPRESSIVE DISORDER (H): ICD-10-CM

## 2018-01-01 DIAGNOSIS — J06.9 ACUTE URI: ICD-10-CM

## 2018-01-01 DIAGNOSIS — J98.4 DISEASE OF LUNG: ICD-10-CM

## 2018-01-01 DIAGNOSIS — E78.5 HYPERLIPIDEMIA LDL GOAL <70: ICD-10-CM

## 2018-01-01 DIAGNOSIS — Z23 NEED FOR TDAP VACCINATION: ICD-10-CM

## 2018-01-01 DIAGNOSIS — I39 ENDOCARDITIS AND HEART VALVE DISORDERS IN DISEASES CLASSIFIED ELSEWHERE: ICD-10-CM

## 2018-01-01 DIAGNOSIS — Z23 NEED FOR PNEUMOCOCCAL VACCINATION: ICD-10-CM

## 2018-01-01 DIAGNOSIS — I50.9 CHF (CONGESTIVE HEART FAILURE), NYHA CLASS II (H): ICD-10-CM

## 2018-01-01 DIAGNOSIS — I25.5 ISCHEMIC CARDIOMYOPATHY: ICD-10-CM

## 2018-01-01 DIAGNOSIS — K59.00 CONSTIPATION, UNSPECIFIED CONSTIPATION TYPE: ICD-10-CM

## 2018-01-01 DIAGNOSIS — A04.72 CLOSTRIDIUM DIFFICILE DIARRHEA: ICD-10-CM

## 2018-01-01 DIAGNOSIS — Z86.73 HISTORY OF CVA (CEREBROVASCULAR ACCIDENT): Primary | ICD-10-CM

## 2018-01-01 DIAGNOSIS — Z98.890 STATUS POST CORONARY ANGIOGRAM: ICD-10-CM

## 2018-01-01 DIAGNOSIS — G51.0 FACIAL PARALYSIS: Primary | ICD-10-CM

## 2018-01-01 DIAGNOSIS — A04.72 C. DIFFICILE COLITIS: ICD-10-CM

## 2018-01-01 DIAGNOSIS — I71.20 THORACIC AORTIC ANEURYSM WITHOUT RUPTURE (H): ICD-10-CM

## 2018-01-01 DIAGNOSIS — Z51.5 HOSPICE CARE PATIENT: Primary | ICD-10-CM

## 2018-01-01 DIAGNOSIS — R13.10 DYSPHAGIA: ICD-10-CM

## 2018-01-01 DIAGNOSIS — R47.1 DYSARTHRIA: Primary | ICD-10-CM

## 2018-01-01 DIAGNOSIS — R97.20 ELEVATED PROSTATE SPECIFIC ANTIGEN (PSA): ICD-10-CM

## 2018-01-01 DIAGNOSIS — Z11.4 SCREENING FOR HIV (HUMAN IMMUNODEFICIENCY VIRUS): ICD-10-CM

## 2018-01-01 DIAGNOSIS — R13.10 PROBLEMS WITH SWALLOWING: ICD-10-CM

## 2018-01-01 DIAGNOSIS — R09.81 SINUS CONGESTION: Primary | ICD-10-CM

## 2018-01-01 DIAGNOSIS — I50.9 CHF (CONGESTIVE HEART FAILURE) (H): Primary | ICD-10-CM

## 2018-01-01 DIAGNOSIS — R10.84 ABDOMINAL PAIN, GENERALIZED: Primary | ICD-10-CM

## 2018-01-01 DIAGNOSIS — Z98.890 HX OF AORTIC ROOT REPAIR: ICD-10-CM

## 2018-01-01 DIAGNOSIS — I50.9 CHF (CONGESTIVE HEART FAILURE) (H): ICD-10-CM

## 2018-01-01 DIAGNOSIS — R06.02 SOB (SHORTNESS OF BREATH): ICD-10-CM

## 2018-01-01 DIAGNOSIS — M25.532 LEFT WRIST PAIN: ICD-10-CM

## 2018-01-01 DIAGNOSIS — K13.79 MOUTH SORE: ICD-10-CM

## 2018-01-01 DIAGNOSIS — R06.02 SOB (SHORTNESS OF BREATH): Primary | ICD-10-CM

## 2018-01-01 DIAGNOSIS — R73.09 ELEVATED GLUCOSE: Primary | ICD-10-CM

## 2018-01-01 DIAGNOSIS — I16.0 HYPERTENSIVE URGENCY: ICD-10-CM

## 2018-01-01 DIAGNOSIS — K13.79 SORE IN MOUTH: Primary | ICD-10-CM

## 2018-01-01 DIAGNOSIS — J06.9 UPPER RESPIRATORY TRACT INFECTION, UNSPECIFIED TYPE: ICD-10-CM

## 2018-01-01 DIAGNOSIS — I95.9 HYPOTENSION, UNSPECIFIED HYPOTENSION TYPE: ICD-10-CM

## 2018-01-01 DIAGNOSIS — D72.829 LEUKOCYTOSIS, UNSPECIFIED TYPE: ICD-10-CM

## 2018-01-01 DIAGNOSIS — Z87.891 PERSONAL HISTORY OF TOBACCO USE, PRESENTING HAZARDS TO HEALTH: ICD-10-CM

## 2018-01-01 DIAGNOSIS — G81.91: ICD-10-CM

## 2018-01-01 DIAGNOSIS — K59.00 CONSTIPATION: ICD-10-CM

## 2018-01-01 DIAGNOSIS — F32.1 MAJOR DEPRESSIVE DISORDER, SINGLE EPISODE, MODERATE (H): Primary | ICD-10-CM

## 2018-01-01 LAB
ALBUMIN SERPL-MCNC: 3 G/DL (ref 3.4–5)
ALBUMIN SERPL-MCNC: 3.1 G/DL (ref 3.4–5)
ALBUMIN SERPL-MCNC: 3.2 G/DL (ref 3.4–5)
ALBUMIN SERPL-MCNC: 3.4 G/DL (ref 3.4–5)
ALBUMIN SERPL-MCNC: 3.4 G/DL (ref 3.4–5)
ALBUMIN SERPL-MCNC: 3.5 G/DL (ref 3.4–5)
ALBUMIN SERPL-MCNC: 3.6 G/DL (ref 3.4–5)
ALBUMIN SERPL-MCNC: 3.6 G/DL (ref 3.4–5)
ALBUMIN SERPL-MCNC: 3.9 G/DL (ref 3.4–5)
ALBUMIN UR-MCNC: 10 MG/DL
ALBUMIN UR-MCNC: 10 MG/DL
ALBUMIN UR-MCNC: NEGATIVE MG/DL
ALBUMIN UR-MCNC: NEGATIVE MG/DL
ALP SERPL-CCNC: 103 U/L (ref 40–150)
ALP SERPL-CCNC: 107 U/L (ref 40–150)
ALP SERPL-CCNC: 112 U/L (ref 40–150)
ALP SERPL-CCNC: 114 U/L (ref 40–150)
ALP SERPL-CCNC: 120 U/L (ref 40–150)
ALP SERPL-CCNC: 123 U/L (ref 40–150)
ALP SERPL-CCNC: 124 U/L (ref 40–150)
ALP SERPL-CCNC: 126 U/L (ref 40–150)
ALP SERPL-CCNC: 135 U/L (ref 40–150)
ALT SERPL W P-5'-P-CCNC: 15 U/L (ref 0–70)
ALT SERPL W P-5'-P-CCNC: 15 U/L (ref 0–70)
ALT SERPL W P-5'-P-CCNC: 17 U/L (ref 0–70)
ALT SERPL W P-5'-P-CCNC: 17 U/L (ref 0–70)
ALT SERPL W P-5'-P-CCNC: 19 U/L (ref 0–70)
ALT SERPL W P-5'-P-CCNC: 19 U/L (ref 0–70)
ALT SERPL W P-5'-P-CCNC: 24 U/L (ref 0–70)
ALT SERPL W P-5'-P-CCNC: 24 U/L (ref 0–70)
ALT SERPL W P-5'-P-CCNC: 34 U/L (ref 0–70)
ANCA AB PATTERN SER IF-IMP: NORMAL
ANION GAP SERPL CALCULATED.3IONS-SCNC: 10 MMOL/L (ref 3–14)
ANION GAP SERPL CALCULATED.3IONS-SCNC: 10 MMOL/L (ref 3–14)
ANION GAP SERPL CALCULATED.3IONS-SCNC: 12.2 MMOL/L (ref 6–17)
ANION GAP SERPL CALCULATED.3IONS-SCNC: 16.3 MMOL/L (ref 6–17)
ANION GAP SERPL CALCULATED.3IONS-SCNC: 2 MMOL/L (ref 3–14)
ANION GAP SERPL CALCULATED.3IONS-SCNC: 4 MMOL/L (ref 3–14)
ANION GAP SERPL CALCULATED.3IONS-SCNC: 6 MMOL/L (ref 3–14)
ANION GAP SERPL CALCULATED.3IONS-SCNC: 7 MMOL/L (ref 3–14)
ANION GAP SERPL CALCULATED.3IONS-SCNC: 7 MMOL/L (ref 3–14)
ANION GAP SERPL CALCULATED.3IONS-SCNC: 8 MMOL/L (ref 3–14)
ANION GAP SERPL CALCULATED.3IONS-SCNC: 9 MMOL/L (ref 3–14)
APPEARANCE UR: ABNORMAL
APPEARANCE UR: CLEAR
AST SERPL W P-5'-P-CCNC: 11 U/L (ref 0–45)
AST SERPL W P-5'-P-CCNC: 14 U/L (ref 0–45)
AST SERPL W P-5'-P-CCNC: 16 U/L (ref 0–45)
AST SERPL W P-5'-P-CCNC: 17 U/L (ref 0–45)
AST SERPL W P-5'-P-CCNC: 17 U/L (ref 0–45)
AST SERPL W P-5'-P-CCNC: 18 U/L (ref 0–45)
AST SERPL W P-5'-P-CCNC: 19 U/L (ref 0–45)
AST SERPL W P-5'-P-CCNC: 20 U/L (ref 0–45)
AST SERPL W P-5'-P-CCNC: 25 U/L (ref 0–45)
BACTERIA #/AREA URNS HPF: ABNORMAL /HPF
BACTERIA SPEC CULT: NO GROWTH
BACTERIA SPEC CULT: NORMAL
BASE EXCESS BLDV CALC-SCNC: 4.2 MMOL/L
BASOPHILS # BLD AUTO: 0 10E9/L (ref 0–0.2)
BASOPHILS # BLD AUTO: 0.1 10E9/L (ref 0–0.2)
BASOPHILS NFR BLD AUTO: 0.2 %
BASOPHILS NFR BLD AUTO: 0.3 %
BASOPHILS NFR BLD AUTO: 0.4 %
BASOPHILS NFR BLD AUTO: 0.5 %
BASOPHILS NFR BLD AUTO: 0.5 %
BASOPHILS NFR BLD AUTO: 0.6 %
BILIRUB SERPL-MCNC: 0.3 MG/DL (ref 0.2–1.3)
BILIRUB SERPL-MCNC: 0.4 MG/DL (ref 0.2–1.3)
BILIRUB SERPL-MCNC: 0.7 MG/DL (ref 0.2–1.3)
BILIRUB UR QL STRIP: NEGATIVE
BUN SERPL-MCNC: 16 MG/DL (ref 7–30)
BUN SERPL-MCNC: 18 MG/DL (ref 7–30)
BUN SERPL-MCNC: 19 MG/DL (ref 7–30)
BUN SERPL-MCNC: 20 MG/DL (ref 7–30)
BUN SERPL-MCNC: 20 MG/DL (ref 7–30)
BUN SERPL-MCNC: 21 MG/DL (ref 7–30)
BUN SERPL-MCNC: 23 MG/DL (ref 7–30)
BUN SERPL-MCNC: 24 MG/DL (ref 7–30)
BUN SERPL-MCNC: 24 MG/DL (ref 7–30)
BUN SERPL-MCNC: 25 MG/DL (ref 7–30)
BUN SERPL-MCNC: 26 MG/DL (ref 7–30)
BUN SERPL-MCNC: 26 MG/DL (ref 7–30)
BUN SERPL-MCNC: 27 MG/DL (ref 7–30)
C COLI+JEJUNI+LARI FUSA STL QL NAA+PROBE: NOT DETECTED
C COLI+JEJUNI+LARI FUSA STL QL NAA+PROBE: NOT DETECTED
C DIFF TOX B STL QL: NEGATIVE
C DIFF TOX B STL QL: POSITIVE
C-ANCA TITR SER IF: NORMAL {TITER}
CALCIUM SERPL-MCNC: 10.3 MG/DL (ref 8.5–10.1)
CALCIUM SERPL-MCNC: 8.6 MG/DL (ref 8.5–10.1)
CALCIUM SERPL-MCNC: 8.7 MG/DL (ref 8.5–10.1)
CALCIUM SERPL-MCNC: 8.9 MG/DL (ref 8.5–10.1)
CALCIUM SERPL-MCNC: 9 MG/DL (ref 8.5–10.1)
CALCIUM SERPL-MCNC: 9.1 MG/DL (ref 8.5–10.5)
CALCIUM SERPL-MCNC: 9.2 MG/DL (ref 8.5–10.1)
CALCIUM SERPL-MCNC: 9.3 MG/DL (ref 8.5–10.1)
CALCIUM SERPL-MCNC: 9.4 MG/DL (ref 8.5–10.1)
CALCIUM SERPL-MCNC: 9.4 MG/DL (ref 8.5–10.1)
CALCIUM SERPL-MCNC: 9.5 MG/DL (ref 8.5–10.1)
CALCIUM SERPL-MCNC: 9.5 MG/DL (ref 8.5–10.1)
CALCIUM SERPL-MCNC: 9.5 MG/DL (ref 8.5–10.5)
CALCIUM SERPL-MCNC: 9.7 MG/DL (ref 8.5–10.1)
CALCIUM SERPL-MCNC: 9.8 MG/DL (ref 8.5–10.1)
CHLORIDE SERPL-SCNC: 101 MMOL/L (ref 94–109)
CHLORIDE SERPL-SCNC: 102 MMOL/L (ref 94–109)
CHLORIDE SERPL-SCNC: 102 MMOL/L (ref 98–107)
CHLORIDE SERPL-SCNC: 104 MMOL/L (ref 94–109)
CHLORIDE SERPL-SCNC: 105 MMOL/L (ref 94–109)
CHLORIDE SERPL-SCNC: 105 MMOL/L (ref 98–107)
CHLORIDE SERPL-SCNC: 106 MMOL/L (ref 94–109)
CHLORIDE SERPL-SCNC: 106 MMOL/L (ref 94–109)
CHLORIDE SERPL-SCNC: 107 MMOL/L (ref 94–109)
CHLORIDE SERPL-SCNC: 108 MMOL/L (ref 94–109)
CHLORIDE SERPL-SCNC: 110 MMOL/L (ref 94–109)
CHOLEST SERPL-MCNC: 107 MG/DL
CHOLEST SERPL-MCNC: 115 MG/DL
CK SERPL-CCNC: 28 U/L (ref 30–300)
CK SERPL-CCNC: 33 U/L (ref 30–300)
CO2 SERPL-SCNC: 25 MMOL/L (ref 20–32)
CO2 SERPL-SCNC: 26 MMOL/L (ref 23–29)
CO2 SERPL-SCNC: 27 MMOL/L (ref 20–32)
CO2 SERPL-SCNC: 28 MMOL/L (ref 20–32)
CO2 SERPL-SCNC: 28 MMOL/L (ref 23–29)
CO2 SERPL-SCNC: 29 MMOL/L (ref 20–32)
CO2 SERPL-SCNC: 29 MMOL/L (ref 20–32)
CO2 SERPL-SCNC: 30 MMOL/L (ref 20–32)
CO2 SERPL-SCNC: 30 MMOL/L (ref 20–32)
CO2 SERPL-SCNC: 31 MMOL/L (ref 20–32)
CO2 SERPL-SCNC: 32 MMOL/L (ref 20–32)
CO2 SERPL-SCNC: 32 MMOL/L (ref 20–32)
COLOR UR AUTO: ABNORMAL
COLOR UR AUTO: YELLOW
CREAT SERPL-MCNC: 0.84 MG/DL (ref 0.66–1.25)
CREAT SERPL-MCNC: 1.1 MG/DL (ref 0.66–1.25)
CREAT SERPL-MCNC: 1.12 MG/DL (ref 0.66–1.25)
CREAT SERPL-MCNC: 1.17 MG/DL (ref 0.66–1.25)
CREAT SERPL-MCNC: 1.19 MG/DL (ref 0.66–1.25)
CREAT SERPL-MCNC: 1.19 MG/DL (ref 0.66–1.25)
CREAT SERPL-MCNC: 1.2 MG/DL (ref 0.66–1.25)
CREAT SERPL-MCNC: 1.21 MG/DL (ref 0.66–1.25)
CREAT SERPL-MCNC: 1.21 MG/DL (ref 0.66–1.25)
CREAT SERPL-MCNC: 1.22 MG/DL (ref 0.7–1.3)
CREAT SERPL-MCNC: 1.23 MG/DL (ref 0.66–1.25)
CREAT SERPL-MCNC: 1.26 MG/DL (ref 0.66–1.25)
CREAT SERPL-MCNC: 1.28 MG/DL (ref 0.66–1.25)
CREAT SERPL-MCNC: 1.34 MG/DL (ref 0.66–1.25)
CREAT SERPL-MCNC: 1.35 MG/DL (ref 0.66–1.25)
CREAT SERPL-MCNC: 1.35 MG/DL (ref 0.66–1.25)
CREAT SERPL-MCNC: 1.38 MG/DL (ref 0.66–1.25)
CREAT SERPL-MCNC: 1.41 MG/DL (ref 0.66–1.25)
CREAT SERPL-MCNC: 1.55 MG/DL (ref 0.7–1.3)
CRP SERPL-MCNC: 14 MG/L (ref 0–8)
CRP SERPL-MCNC: 26 MG/L (ref 0–8)
CRP SERPL-MCNC: 38 MG/L (ref 0–8)
CRP SERPL-MCNC: 78.3 MG/L (ref 0–8)
CRP SERPL-MCNC: <2.9 MG/L (ref 0–8)
D DIMER PPP FEU-MCNC: 1.7 UG/ML FEU (ref 0–0.5)
DEPRECATED CALCIDIOL+CALCIFEROL SERPL-MC: 43 UG/L (ref 20–75)
DEPRECATED CALCIDIOL+CALCIFEROL SERPL-MC: 45 UG/L (ref 20–75)
DEPRECATED CALCIDIOL+CALCIFEROL SERPL-MC: 54 UG/L (ref 20–75)
DIFFERENTIAL METHOD BLD: ABNORMAL
DIFFERENTIAL METHOD BLD: NORMAL
EC STX1 GENE STL QL NAA+PROBE: NOT DETECTED
EC STX1 GENE STL QL NAA+PROBE: NOT DETECTED
EC STX2 GENE STL QL NAA+PROBE: NOT DETECTED
EC STX2 GENE STL QL NAA+PROBE: NOT DETECTED
ENTERIC PATHOGEN COMMENT: NORMAL
ENTERIC PATHOGEN COMMENT: NORMAL
EOSINOPHIL # BLD AUTO: 0.2 10E9/L (ref 0–0.7)
EOSINOPHIL # BLD AUTO: 0.5 10E9/L (ref 0–0.7)
EOSINOPHIL # BLD AUTO: 0.5 10E9/L (ref 0–0.7)
EOSINOPHIL # BLD AUTO: 0.6 10E9/L (ref 0–0.7)
EOSINOPHIL # BLD AUTO: 0.7 10E9/L (ref 0–0.7)
EOSINOPHIL # BLD AUTO: 1.1 10E9/L (ref 0–0.7)
EOSINOPHIL NFR BLD AUTO: 1.8 %
EOSINOPHIL NFR BLD AUTO: 13.2 %
EOSINOPHIL NFR BLD AUTO: 4.3 %
EOSINOPHIL NFR BLD AUTO: 5.2 %
EOSINOPHIL NFR BLD AUTO: 5.6 %
EOSINOPHIL NFR BLD AUTO: 6.6 %
ERYTHROCYTE [DISTWIDTH] IN BLOOD BY AUTOMATED COUNT: 12.2 % (ref 10–15)
ERYTHROCYTE [DISTWIDTH] IN BLOOD BY AUTOMATED COUNT: 12.3 % (ref 10–15)
ERYTHROCYTE [DISTWIDTH] IN BLOOD BY AUTOMATED COUNT: 12.5 % (ref 10–15)
ERYTHROCYTE [DISTWIDTH] IN BLOOD BY AUTOMATED COUNT: 12.7 % (ref 10–15)
ERYTHROCYTE [DISTWIDTH] IN BLOOD BY AUTOMATED COUNT: 13.7 % (ref 10–15)
ERYTHROCYTE [DISTWIDTH] IN BLOOD BY AUTOMATED COUNT: 13.8 % (ref 10–15)
ERYTHROCYTE [DISTWIDTH] IN BLOOD BY AUTOMATED COUNT: 13.8 % (ref 10–15)
ERYTHROCYTE [DISTWIDTH] IN BLOOD BY AUTOMATED COUNT: 13.9 % (ref 10–15)
ERYTHROCYTE [DISTWIDTH] IN BLOOD BY AUTOMATED COUNT: 14.1 % (ref 10–15)
ERYTHROCYTE [DISTWIDTH] IN BLOOD BY AUTOMATED COUNT: 14.7 % (ref 10–15)
ERYTHROCYTE [DISTWIDTH] IN BLOOD BY AUTOMATED COUNT: 14.8 % (ref 10–15)
ERYTHROCYTE [DISTWIDTH] IN BLOOD BY AUTOMATED COUNT: 15.4 % (ref 10–15)
ERYTHROCYTE [DISTWIDTH] IN BLOOD BY AUTOMATED COUNT: 19 % (ref 10–15)
ERYTHROCYTE [SEDIMENTATION RATE] IN BLOOD BY WESTERGREN METHOD: 18 MM/H (ref 0–20)
ERYTHROCYTE [SEDIMENTATION RATE] IN BLOOD BY WESTERGREN METHOD: 40 MM/H (ref 0–20)
ERYTHROCYTE [SEDIMENTATION RATE] IN BLOOD BY WESTERGREN METHOD: 42 MM/H (ref 0–20)
ERYTHROCYTE [SEDIMENTATION RATE] IN BLOOD BY WESTERGREN METHOD: 48 MM/H (ref 0–20)
ERYTHROCYTE [SEDIMENTATION RATE] IN BLOOD BY WESTERGREN METHOD: 9 MM/H (ref 0–20)
FERRITIN SERPL-MCNC: 57 NG/ML (ref 26–388)
FLUAV+FLUBV AG SPEC QL: NEGATIVE
FLUAV+FLUBV AG SPEC QL: NEGATIVE
G LAMBLIA AG STL QL IA: NORMAL
GFR SERPL CREATININE-BSD FRML MDRD: 45 ML/MIN/1.7M2
GFR SERPL CREATININE-BSD FRML MDRD: 50 ML/MIN/1.7M2
GFR SERPL CREATININE-BSD FRML MDRD: 52 ML/MIN/1.7M2
GFR SERPL CREATININE-BSD FRML MDRD: 53 ML/MIN/1.7M2
GFR SERPL CREATININE-BSD FRML MDRD: 53 ML/MIN/1.7M2
GFR SERPL CREATININE-BSD FRML MDRD: 54 ML/MIN/1.7M2
GFR SERPL CREATININE-BSD FRML MDRD: 56 ML/MIN/1.7M2
GFR SERPL CREATININE-BSD FRML MDRD: 57 ML/MIN/1.7M2
GFR SERPL CREATININE-BSD FRML MDRD: 59 ML/MIN/1.7M2
GFR SERPL CREATININE-BSD FRML MDRD: 60 ML/MIN/1.7M2
GFR SERPL CREATININE-BSD FRML MDRD: 61 ML/MIN/1.7M2
GFR SERPL CREATININE-BSD FRML MDRD: 63 ML/MIN/1.7M2
GFR SERPL CREATININE-BSD FRML MDRD: 66 ML/MIN/1.7M2
GFR SERPL CREATININE-BSD FRML MDRD: 67 ML/MIN/1.7M2
GFR SERPL CREATININE-BSD FRML MDRD: >90 ML/MIN/1.7M2
GLUCOSE SERPL-MCNC: 100 MG/DL (ref 70–99)
GLUCOSE SERPL-MCNC: 102 MG/DL (ref 70–99)
GLUCOSE SERPL-MCNC: 105 MG/DL (ref 70–99)
GLUCOSE SERPL-MCNC: 106 MG/DL (ref 70–99)
GLUCOSE SERPL-MCNC: 112 MG/DL (ref 70–99)
GLUCOSE SERPL-MCNC: 122 MG/DL (ref 70–105)
GLUCOSE SERPL-MCNC: 122 MG/DL (ref 70–99)
GLUCOSE SERPL-MCNC: 123 MG/DL (ref 70–99)
GLUCOSE SERPL-MCNC: 123 MG/DL (ref 70–99)
GLUCOSE SERPL-MCNC: 124 MG/DL (ref 70–99)
GLUCOSE SERPL-MCNC: 126 MG/DL (ref 70–99)
GLUCOSE SERPL-MCNC: 133 MG/DL (ref 70–99)
GLUCOSE SERPL-MCNC: 83 MG/DL (ref 70–99)
GLUCOSE SERPL-MCNC: 85 MG/DL (ref 70–99)
GLUCOSE SERPL-MCNC: 88 MG/DL (ref 70–99)
GLUCOSE SERPL-MCNC: 88 MG/DL (ref 70–99)
GLUCOSE SERPL-MCNC: 92 MG/DL (ref 70–105)
GLUCOSE SERPL-MCNC: 93 MG/DL (ref 70–99)
GLUCOSE SERPL-MCNC: 96 MG/DL (ref 70–99)
GLUCOSE UR STRIP-MCNC: 30 MG/DL
GLUCOSE UR STRIP-MCNC: NEGATIVE MG/DL
HBA1C MFR BLD: 5.3 % (ref 0–5.6)
HCO3 BLDV-SCNC: 32 MMOL/L (ref 21–28)
HCT VFR BLD AUTO: 31 % (ref 40–53)
HCT VFR BLD AUTO: 36.3 % (ref 40–53)
HCT VFR BLD AUTO: 37.6 % (ref 40–53)
HCT VFR BLD AUTO: 37.8 % (ref 40–53)
HCT VFR BLD AUTO: 39.2 % (ref 40–53)
HCT VFR BLD AUTO: 41.9 % (ref 40–53)
HCT VFR BLD AUTO: 42.8 % (ref 40–53)
HCT VFR BLD AUTO: 43.3 % (ref 40–53)
HCT VFR BLD AUTO: 43.7 % (ref 40–53)
HCT VFR BLD AUTO: 44.2 % (ref 40–53)
HCT VFR BLD AUTO: 45.7 % (ref 40–53)
HCT VFR BLD AUTO: 46.5 % (ref 40–53)
HCT VFR BLD AUTO: 47.3 % (ref 40–53)
HCV AB SERPL QL IA: NONREACTIVE
HDLC SERPL-MCNC: 39 MG/DL
HDLC SERPL-MCNC: 45 MG/DL
HGB BLD-MCNC: 11.7 G/DL (ref 13.3–17.7)
HGB BLD-MCNC: 11.8 G/DL (ref 13.3–17.7)
HGB BLD-MCNC: 12.3 G/DL (ref 13.3–17.7)
HGB BLD-MCNC: 12.6 G/DL (ref 13.3–17.7)
HGB BLD-MCNC: 13.5 G/DL (ref 13.3–17.7)
HGB BLD-MCNC: 13.6 G/DL (ref 13.3–17.7)
HGB BLD-MCNC: 13.7 G/DL (ref 13.3–17.7)
HGB BLD-MCNC: 13.8 G/DL (ref 13.3–17.7)
HGB BLD-MCNC: 14.5 G/DL (ref 13.3–17.7)
HGB BLD-MCNC: 14.6 G/DL (ref 13.3–17.7)
HGB BLD-MCNC: 15 G/DL (ref 13.3–17.7)
HGB BLD-MCNC: 15.9 G/DL (ref 13.3–17.7)
HGB BLD-MCNC: 9.4 G/DL (ref 13.3–17.7)
HGB UR QL STRIP: ABNORMAL
HGB UR QL STRIP: NEGATIVE
HIV 1+2 AB+HIV1 P24 AG SERPL QL IA: NONREACTIVE
HYALINE CASTS #/AREA URNS LPF: 3 /LPF (ref 0–2)
HYALINE CASTS #/AREA URNS LPF: 4 /LPF (ref 0–2)
IMM GRANULOCYTES # BLD: 0 10E9/L (ref 0–0.4)
IMM GRANULOCYTES NFR BLD: 0.2 %
IMM GRANULOCYTES NFR BLD: 0.2 %
IMM GRANULOCYTES NFR BLD: 0.3 %
IMM GRANULOCYTES NFR BLD: 0.4 %
INTERPRETATION ECG - MUSE: NORMAL
INTERPRETATION ECG - MUSE: NORMAL
IRON SATN MFR SERPL: 11 % (ref 15–46)
IRON SERPL-MCNC: 36 UG/DL (ref 35–180)
KETONES UR STRIP-MCNC: 10 MG/DL
KETONES UR STRIP-MCNC: NEGATIVE MG/DL
LACTATE BLD-SCNC: 1.5 MMOL/L (ref 0.7–2)
LACTOFERRIN STL QL IA: NEGATIVE
LDLC SERPL CALC-MCNC: 41 MG/DL
LDLC SERPL CALC-MCNC: 47 MG/DL
LEUKOCYTE ESTERASE UR QL STRIP: NEGATIVE
LIPASE SERPL-CCNC: 59 U/L (ref 73–393)
LIPASE SERPL-CCNC: 86 U/L (ref 73–393)
LYMPHOCYTES # BLD AUTO: 0.9 10E9/L (ref 0.8–5.3)
LYMPHOCYTES # BLD AUTO: 1 10E9/L (ref 0.8–5.3)
LYMPHOCYTES # BLD AUTO: 1.1 10E9/L (ref 0.8–5.3)
LYMPHOCYTES # BLD AUTO: 1.4 10E9/L (ref 0.8–5.3)
LYMPHOCYTES # BLD AUTO: 1.6 10E9/L (ref 0.8–5.3)
LYMPHOCYTES # BLD AUTO: 1.7 10E9/L (ref 0.8–5.3)
LYMPHOCYTES NFR BLD AUTO: 13.9 %
LYMPHOCYTES NFR BLD AUTO: 15.7 %
LYMPHOCYTES NFR BLD AUTO: 15.8 %
LYMPHOCYTES NFR BLD AUTO: 16.2 %
LYMPHOCYTES NFR BLD AUTO: 7.9 %
LYMPHOCYTES NFR BLD AUTO: 8.5 %
Lab: NORMAL
MAGNESIUM SERPL-MCNC: 2.1 MG/DL (ref 1.6–2.3)
MCH RBC QN AUTO: 28.2 PG (ref 26.5–33)
MCH RBC QN AUTO: 28.4 PG (ref 26.5–33)
MCH RBC QN AUTO: 28.4 PG (ref 26.5–33)
MCH RBC QN AUTO: 28.6 PG (ref 26.5–33)
MCH RBC QN AUTO: 28.7 PG (ref 26.5–33)
MCH RBC QN AUTO: 29.2 PG (ref 26.5–33)
MCH RBC QN AUTO: 29.2 PG (ref 26.5–33)
MCH RBC QN AUTO: 29.6 PG (ref 26.5–33)
MCH RBC QN AUTO: 30.2 PG (ref 26.5–33)
MCH RBC QN AUTO: 30.5 PG (ref 26.5–33)
MCH RBC QN AUTO: 31.2 PG (ref 26.5–33)
MCH RBC QN AUTO: 31.3 PG (ref 26.5–33)
MCH RBC QN AUTO: 32.2 PG (ref 26.5–33)
MCHC RBC AUTO-ENTMCNC: 30.3 G/DL (ref 31.5–36.5)
MCHC RBC AUTO-ENTMCNC: 31.1 G/DL (ref 31.5–36.5)
MCHC RBC AUTO-ENTMCNC: 31.2 G/DL (ref 31.5–36.5)
MCHC RBC AUTO-ENTMCNC: 31.4 G/DL (ref 31.5–36.5)
MCHC RBC AUTO-ENTMCNC: 31.8 G/DL (ref 31.5–36.5)
MCHC RBC AUTO-ENTMCNC: 31.9 G/DL (ref 31.5–36.5)
MCHC RBC AUTO-ENTMCNC: 31.9 G/DL (ref 31.5–36.5)
MCHC RBC AUTO-ENTMCNC: 32.1 G/DL (ref 31.5–36.5)
MCHC RBC AUTO-ENTMCNC: 32.2 G/DL (ref 31.5–36.5)
MCHC RBC AUTO-ENTMCNC: 32.5 G/DL (ref 31.5–36.5)
MCHC RBC AUTO-ENTMCNC: 32.5 G/DL (ref 31.5–36.5)
MCHC RBC AUTO-ENTMCNC: 33.6 G/DL (ref 31.5–36.5)
MCHC RBC AUTO-ENTMCNC: 33.9 G/DL (ref 31.5–36.5)
MCV RBC AUTO: 103 FL (ref 78–100)
MCV RBC AUTO: 86 FL (ref 78–100)
MCV RBC AUTO: 87 FL (ref 78–100)
MCV RBC AUTO: 89 FL (ref 78–100)
MCV RBC AUTO: 89 FL (ref 78–100)
MCV RBC AUTO: 90 FL (ref 78–100)
MCV RBC AUTO: 90 FL (ref 78–100)
MCV RBC AUTO: 91 FL (ref 78–100)
MCV RBC AUTO: 94 FL (ref 78–100)
MCV RBC AUTO: 95 FL (ref 78–100)
MCV RBC AUTO: 95 FL (ref 78–100)
MCV RBC AUTO: 97 FL (ref 78–100)
MCV RBC AUTO: 99 FL (ref 78–100)
MONOCYTES # BLD AUTO: 0.4 10E9/L (ref 0–1.3)
MONOCYTES # BLD AUTO: 0.5 10E9/L (ref 0–1.3)
MONOCYTES # BLD AUTO: 0.6 10E9/L (ref 0–1.3)
MONOCYTES # BLD AUTO: 0.6 10E9/L (ref 0–1.3)
MONOCYTES # BLD AUTO: 0.7 10E9/L (ref 0–1.3)
MONOCYTES # BLD AUTO: 0.9 10E9/L (ref 0–1.3)
MONOCYTES NFR BLD AUTO: 4.9 %
MONOCYTES NFR BLD AUTO: 5.8 %
MONOCYTES NFR BLD AUTO: 5.9 %
MONOCYTES NFR BLD AUTO: 6.1 %
MONOCYTES NFR BLD AUTO: 6.4 %
MONOCYTES NFR BLD AUTO: 7.4 %
MRSA DNA SPEC QL NAA+PROBE: NEGATIVE
MUCOUS THREADS #/AREA URNS LPF: PRESENT /LPF
NEUTROPHILS # BLD AUTO: 5.3 10E9/L (ref 1.6–8.3)
NEUTROPHILS # BLD AUTO: 6.4 10E9/L (ref 1.6–8.3)
NEUTROPHILS # BLD AUTO: 7 10E9/L (ref 1.6–8.3)
NEUTROPHILS # BLD AUTO: 7.9 10E9/L (ref 1.6–8.3)
NEUTROPHILS # BLD AUTO: 9 10E9/L (ref 1.6–8.3)
NEUTROPHILS # BLD AUTO: 9.9 10E9/L (ref 1.6–8.3)
NEUTROPHILS NFR BLD AUTO: 65.9 %
NEUTROPHILS NFR BLD AUTO: 70.8 %
NEUTROPHILS NFR BLD AUTO: 72.4 %
NEUTROPHILS NFR BLD AUTO: 72.8 %
NEUTROPHILS NFR BLD AUTO: 79.8 %
NEUTROPHILS NFR BLD AUTO: 83.5 %
NITRATE UR QL: NEGATIVE
NONHDLC SERPL-MCNC: 68 MG/DL
NONHDLC SERPL-MCNC: 70 MG/DL
NOROV GI+II ORF1-ORF2 JNC STL QL NAA+PR: NOT DETECTED
NOROV GI+II ORF1-ORF2 JNC STL QL NAA+PR: NOT DETECTED
NRBC # BLD AUTO: 0 10*3/UL
NRBC BLD AUTO-RTO: 0 /100
NT-PROBNP SERPL-MCNC: 1656 PG/ML (ref 0–900)
NT-PROBNP SERPL-MCNC: 234 PG/ML (ref 0–125)
NT-PROBNP SERPL-MCNC: 294 PG/ML (ref 0–125)
O+P STL MICRO: NORMAL
O+P STL MICRO: NORMAL
O2/TOTAL GAS SETTING VFR VENT: ABNORMAL %
PCO2 BLDV: 59 MM HG (ref 40–50)
PH BLDV: 7.34 PH (ref 7.32–7.43)
PH UR STRIP: 6 PH (ref 5–7)
PH UR STRIP: 6.5 PH (ref 5–7)
PLATELET # BLD AUTO: 231 10E9/L (ref 150–450)
PLATELET # BLD AUTO: 251 10E9/L (ref 150–450)
PLATELET # BLD AUTO: 252 10E9/L (ref 150–450)
PLATELET # BLD AUTO: 262 10E9/L (ref 150–450)
PLATELET # BLD AUTO: 279 10E9/L (ref 150–450)
PLATELET # BLD AUTO: 280 10E9/L (ref 150–450)
PLATELET # BLD AUTO: 304 10E9/L (ref 150–450)
PLATELET # BLD AUTO: 305 10E9/L (ref 150–450)
PLATELET # BLD AUTO: 338 10E9/L (ref 150–450)
PLATELET # BLD AUTO: 352 10E9/L (ref 150–450)
PLATELET # BLD AUTO: 380 10E9/L (ref 150–450)
PLATELET # BLD AUTO: 389 10E9/L (ref 150–450)
PLATELET # BLD AUTO: 495 10E9/L (ref 150–450)
PO2 BLDV: 31 MM HG (ref 25–47)
POTASSIUM SERPL-SCNC: 3.8 MMOL/L (ref 3.4–5.3)
POTASSIUM SERPL-SCNC: 4 MMOL/L (ref 3.4–5.3)
POTASSIUM SERPL-SCNC: 4.1 MMOL/L (ref 3.4–5.3)
POTASSIUM SERPL-SCNC: 4.2 MMOL/L (ref 3.4–5.3)
POTASSIUM SERPL-SCNC: 4.2 MMOL/L (ref 3.5–5.1)
POTASSIUM SERPL-SCNC: 4.3 MMOL/L (ref 3.4–5.3)
POTASSIUM SERPL-SCNC: 4.3 MMOL/L (ref 3.4–5.3)
POTASSIUM SERPL-SCNC: 4.3 MMOL/L (ref 3.5–5.1)
POTASSIUM SERPL-SCNC: 4.4 MMOL/L (ref 3.4–5.3)
POTASSIUM SERPL-SCNC: 4.4 MMOL/L (ref 3.4–5.3)
POTASSIUM SERPL-SCNC: 4.5 MMOL/L (ref 3.4–5.3)
PROCALCITONIN SERPL-MCNC: 0.54 NG/ML
PROT SERPL-MCNC: 7.5 G/DL (ref 6.8–8.8)
PROT SERPL-MCNC: 7.6 G/DL (ref 6.8–8.8)
PROT SERPL-MCNC: 7.7 G/DL (ref 6.8–8.8)
PROT SERPL-MCNC: 7.7 G/DL (ref 6.8–8.8)
PROT SERPL-MCNC: 8.2 G/DL (ref 6.8–8.8)
PROT SERPL-MCNC: 8.3 G/DL (ref 6.8–8.8)
PROT SERPL-MCNC: 8.3 G/DL (ref 6.8–8.8)
PROT SERPL-MCNC: 8.4 G/DL (ref 6.8–8.8)
PROT SERPL-MCNC: 8.5 G/DL (ref 6.8–8.8)
PSA SERPL-ACNC: 8.08 UG/L (ref 0–4)
RADIOLOGIST FLAGS: ABNORMAL
RBC # BLD AUTO: 3.01 10E12/L (ref 4.4–5.9)
RBC # BLD AUTO: 3.67 10E12/L (ref 4.4–5.9)
RBC # BLD AUTO: 3.95 10E12/L (ref 4.4–5.9)
RBC # BLD AUTO: 4.03 10E12/L (ref 4.4–5.9)
RBC # BLD AUTO: 4.32 10E12/L (ref 4.4–5.9)
RBC # BLD AUTO: 4.43 10E12/L (ref 4.4–5.9)
RBC # BLD AUTO: 4.5 10E12/L (ref 4.4–5.9)
RBC # BLD AUTO: 4.81 10E12/L (ref 4.4–5.9)
RBC # BLD AUTO: 4.85 10E12/L (ref 4.4–5.9)
RBC # BLD AUTO: 5.11 10E12/L (ref 4.4–5.9)
RBC # BLD AUTO: 5.11 10E12/L (ref 4.4–5.9)
RBC # BLD AUTO: 5.13 10E12/L (ref 4.4–5.9)
RBC # BLD AUTO: 5.45 10E12/L (ref 4.4–5.9)
RBC #/AREA URNS AUTO: 1 /HPF (ref 0–2)
RBC #/AREA URNS AUTO: 1 /HPF (ref 0–2)
RBC #/AREA URNS AUTO: <1 /HPF (ref 0–2)
RBC #/AREA URNS AUTO: <1 /HPF (ref 0–2)
RVA NSP5 STL QL NAA+PROBE: NOT DETECTED
RVA NSP5 STL QL NAA+PROBE: NOT DETECTED
SALMONELLA SP RPOD STL QL NAA+PROBE: NOT DETECTED
SALMONELLA SP RPOD STL QL NAA+PROBE: NOT DETECTED
SHIGELLA SP+EIEC IPAH STL QL NAA+PROBE: NOT DETECTED
SHIGELLA SP+EIEC IPAH STL QL NAA+PROBE: NOT DETECTED
SODIUM SERPL-SCNC: 138 MMOL/L (ref 133–144)
SODIUM SERPL-SCNC: 139 MMOL/L (ref 133–144)
SODIUM SERPL-SCNC: 140 MMOL/L (ref 133–144)
SODIUM SERPL-SCNC: 140 MMOL/L (ref 136–145)
SODIUM SERPL-SCNC: 141 MMOL/L (ref 133–144)
SODIUM SERPL-SCNC: 141 MMOL/L (ref 133–144)
SODIUM SERPL-SCNC: 141 MMOL/L (ref 136–145)
SODIUM SERPL-SCNC: 142 MMOL/L (ref 133–144)
SODIUM SERPL-SCNC: 142 MMOL/L (ref 133–144)
SODIUM SERPL-SCNC: 143 MMOL/L (ref 133–144)
SODIUM SERPL-SCNC: 143 MMOL/L (ref 133–144)
SODIUM SERPL-SCNC: 145 MMOL/L (ref 133–144)
SOURCE: ABNORMAL
SP GR UR STRIP: 1.01 (ref 1–1.03)
SPECIMEN SOURCE: ABNORMAL
SPECIMEN SOURCE: NORMAL
SQUAMOUS #/AREA URNS AUTO: 1 /HPF (ref 0–1)
SQUAMOUS #/AREA URNS AUTO: <1 /HPF (ref 0–1)
SQUAMOUS #/AREA URNS AUTO: <1 /HPF (ref 0–1)
T4 FREE SERPL-MCNC: 0.96 NG/DL (ref 0.76–1.46)
T4 FREE SERPL-MCNC: 1.02 NG/DL (ref 0.76–1.46)
T4 FREE SERPL-MCNC: 1.1 NG/DL (ref 0.76–1.46)
TIBC SERPL-MCNC: 320 UG/DL (ref 240–430)
TRIGL SERPL-MCNC: 116 MG/DL
TRIGL SERPL-MCNC: 133 MG/DL
TROPONIN I SERPL-MCNC: <0.015 UG/L (ref 0–0.04)
TROPONIN I SERPL-MCNC: <0.015 UG/L (ref 0–0.04)
TSH SERPL DL<=0.005 MIU/L-ACNC: 1.06 MU/L (ref 0.4–4)
TSH SERPL DL<=0.005 MIU/L-ACNC: 2.44 MU/L (ref 0.4–4)
UROBILINOGEN UR STRIP-MCNC: 0 MG/DL (ref 0–2)
UROBILINOGEN UR STRIP-MCNC: 0 MG/DL (ref 0–2)
UROBILINOGEN UR STRIP-MCNC: NORMAL MG/DL (ref 0–2)
UROBILINOGEN UR STRIP-MCNC: NORMAL MG/DL (ref 0–2)
V CHOL+PARA RFBL+TRKH+TNAA STL QL NAA+PR: NOT DETECTED
V CHOL+PARA RFBL+TRKH+TNAA STL QL NAA+PR: NOT DETECTED
VIT B12 SERPL-MCNC: 1034 PG/ML (ref 193–986)
VIT B12 SERPL-MCNC: 1177 PG/ML (ref 193–986)
VIT B12 SERPL-MCNC: 921 PG/ML (ref 193–986)
WBC # BLD AUTO: 10 10E9/L (ref 4–11)
WBC # BLD AUTO: 10.8 10E9/L (ref 4–11)
WBC # BLD AUTO: 10.9 10E9/L (ref 4–11)
WBC # BLD AUTO: 10.9 10E9/L (ref 4–11)
WBC # BLD AUTO: 12.4 10E9/L (ref 4–11)
WBC # BLD AUTO: 12.7 10E9/L (ref 4–11)
WBC # BLD AUTO: 7.6 10E9/L (ref 4–11)
WBC # BLD AUTO: 8 10E9/L (ref 4–11)
WBC # BLD AUTO: 8.2 10E9/L (ref 4–11)
WBC # BLD AUTO: 8.8 10E9/L (ref 4–11)
WBC # BLD AUTO: 8.9 10E9/L (ref 4–11)
WBC # BLD AUTO: 9.1 10E9/L (ref 4–11)
WBC # BLD AUTO: 9.9 10E9/L (ref 4–11)
WBC #/AREA URNS AUTO: 1 /HPF (ref 0–5)
WBC #/AREA URNS AUTO: 1 /HPF (ref 0–5)
WBC #/AREA URNS AUTO: <1 /HPF (ref 0–2)
WBC #/AREA URNS AUTO: <1 /HPF (ref 0–5)
Y ENTERO RECN STL QL NAA+PROBE: NOT DETECTED
Y ENTERO RECN STL QL NAA+PROBE: NOT DETECTED

## 2018-01-01 PROCEDURE — 82306 VITAMIN D 25 HYDROXY: CPT | Performed by: FAMILY MEDICINE

## 2018-01-01 PROCEDURE — 36415 COLL VENOUS BLD VENIPUNCTURE: CPT | Performed by: PHYSICIAN ASSISTANT

## 2018-01-01 PROCEDURE — 99285 EMERGENCY DEPT VISIT HI MDM: CPT | Mod: 25

## 2018-01-01 PROCEDURE — A9270 NON-COVERED ITEM OR SERVICE: HCPCS | Mod: GY | Performed by: PHYSICIAN ASSISTANT

## 2018-01-01 PROCEDURE — 83880 ASSAY OF NATRIURETIC PEPTIDE: CPT | Performed by: NURSE PRACTITIONER

## 2018-01-01 PROCEDURE — 87209 SMEAR COMPLEX STAIN: CPT | Performed by: FAMILY MEDICINE

## 2018-01-01 PROCEDURE — 70450 CT HEAD/BRAIN W/O DYE: CPT

## 2018-01-01 PROCEDURE — 93306 TTE W/DOPPLER COMPLETE: CPT | Mod: 26 | Performed by: INTERNAL MEDICINE

## 2018-01-01 PROCEDURE — G8999 MOTOR SPEECH CURRENT STATUS: HCPCS | Mod: GN,CM | Performed by: SPEECH-LANGUAGE PATHOLOGIST

## 2018-01-01 PROCEDURE — 36415 COLL VENOUS BLD VENIPUNCTURE: CPT | Performed by: FAMILY MEDICINE

## 2018-01-01 PROCEDURE — 72197 MRI PELVIS W/O & W/DYE: CPT

## 2018-01-01 PROCEDURE — 99316 NF DSCHRG MGMT 30 MIN+: CPT | Performed by: NURSE PRACTITIONER

## 2018-01-01 PROCEDURE — 96366 THER/PROPH/DIAG IV INF ADDON: CPT

## 2018-01-01 PROCEDURE — 83550 IRON BINDING TEST: CPT | Performed by: FAMILY MEDICINE

## 2018-01-01 PROCEDURE — 90662 IIV NO PRSV INCREASED AG IM: CPT | Performed by: FAMILY MEDICINE

## 2018-01-01 PROCEDURE — 82607 VITAMIN B-12: CPT | Performed by: FAMILY MEDICINE

## 2018-01-01 PROCEDURE — 87640 STAPH A DNA AMP PROBE: CPT | Performed by: INTERNAL MEDICINE

## 2018-01-01 PROCEDURE — 84484 ASSAY OF TROPONIN QUANT: CPT | Performed by: EMERGENCY MEDICINE

## 2018-01-01 PROCEDURE — 25000128 H RX IP 250 OP 636: Performed by: INTERNAL MEDICINE

## 2018-01-01 PROCEDURE — 99214 OFFICE O/P EST MOD 30 MIN: CPT | Performed by: FAMILY MEDICINE

## 2018-01-01 PROCEDURE — 99233 SBSQ HOSP IP/OBS HIGH 50: CPT | Performed by: PHYSICIAN ASSISTANT

## 2018-01-01 PROCEDURE — 99221 1ST HOSP IP/OBS SF/LOW 40: CPT | Mod: AI | Performed by: INTERNAL MEDICINE

## 2018-01-01 PROCEDURE — 99223 1ST HOSP IP/OBS HIGH 75: CPT | Performed by: INTERNAL MEDICINE

## 2018-01-01 PROCEDURE — 83690 ASSAY OF LIPASE: CPT | Performed by: EMERGENCY MEDICINE

## 2018-01-01 PROCEDURE — 85025 COMPLETE CBC W/AUTO DIFF WBC: CPT | Performed by: FAMILY MEDICINE

## 2018-01-01 PROCEDURE — 74177 CT ABD & PELVIS W/CONTRAST: CPT

## 2018-01-01 PROCEDURE — 25500064 ZZH RX 255 OP 636: Performed by: INTERNAL MEDICINE

## 2018-01-01 PROCEDURE — 85025 COMPLETE CBC W/AUTO DIFF WBC: CPT | Performed by: EMERGENCY MEDICINE

## 2018-01-01 PROCEDURE — 99239 HOSP IP/OBS DSCHRG MGMT >30: CPT | Performed by: PHYSICIAN ASSISTANT

## 2018-01-01 PROCEDURE — 36415 COLL VENOUS BLD VENIPUNCTURE: CPT | Performed by: NURSE PRACTITIONER

## 2018-01-01 PROCEDURE — 93005 ELECTROCARDIOGRAM TRACING: CPT

## 2018-01-01 PROCEDURE — 25000132 ZZH RX MED GY IP 250 OP 250 PS 637: Performed by: PHYSICIAN ASSISTANT

## 2018-01-01 PROCEDURE — 81001 URINALYSIS AUTO W/SCOPE: CPT | Performed by: EMERGENCY MEDICINE

## 2018-01-01 PROCEDURE — 40000211 ZZHC STATISTIC SLP  DEPARTMENT VISIT: Performed by: SPEECH-LANGUAGE PATHOLOGIST

## 2018-01-01 PROCEDURE — 87493 C DIFF AMPLIFIED PROBE: CPT | Mod: XU | Performed by: EMERGENCY MEDICINE

## 2018-01-01 PROCEDURE — 36415 COLL VENOUS BLD VENIPUNCTURE: CPT | Performed by: EMERGENCY MEDICINE

## 2018-01-01 PROCEDURE — 80061 LIPID PANEL: CPT | Performed by: FAMILY MEDICINE

## 2018-01-01 PROCEDURE — 25000132 ZZH RX MED GY IP 250 OP 250 PS 637: Mod: GY | Performed by: INTERNAL MEDICINE

## 2018-01-01 PROCEDURE — 85027 COMPLETE CBC AUTOMATED: CPT | Performed by: FAMILY MEDICINE

## 2018-01-01 PROCEDURE — A9270 NON-COVERED ITEM OR SERVICE: HCPCS | Mod: GY | Performed by: INTERNAL MEDICINE

## 2018-01-01 PROCEDURE — 84443 ASSAY THYROID STIM HORMONE: CPT | Performed by: FAMILY MEDICINE

## 2018-01-01 PROCEDURE — 25000128 H RX IP 250 OP 636: Performed by: UROLOGY

## 2018-01-01 PROCEDURE — 96375 TX/PRO/DX INJ NEW DRUG ADDON: CPT

## 2018-01-01 PROCEDURE — 87389 HIV-1 AG W/HIV-1&-2 AB AG IA: CPT | Performed by: FAMILY MEDICINE

## 2018-01-01 PROCEDURE — 99233 SBSQ HOSP IP/OBS HIGH 50: CPT | Performed by: INTERNAL MEDICINE

## 2018-01-01 PROCEDURE — 92507 TX SP LANG VOICE COMM INDIV: CPT | Mod: GN | Performed by: SPEECH-LANGUAGE PATHOLOGIST

## 2018-01-01 PROCEDURE — 71275 CT ANGIOGRAPHY CHEST: CPT

## 2018-01-01 PROCEDURE — 83540 ASSAY OF IRON: CPT | Performed by: FAMILY MEDICINE

## 2018-01-01 PROCEDURE — 25000128 H RX IP 250 OP 636: Performed by: EMERGENCY MEDICINE

## 2018-01-01 PROCEDURE — 71046 X-RAY EXAM CHEST 2 VIEWS: CPT

## 2018-01-01 PROCEDURE — 85652 RBC SED RATE AUTOMATED: CPT | Performed by: FAMILY MEDICINE

## 2018-01-01 PROCEDURE — 25000132 ZZH RX MED GY IP 250 OP 250 PS 637: Performed by: SURGERY

## 2018-01-01 PROCEDURE — 84439 ASSAY OF FREE THYROXINE: CPT | Performed by: FAMILY MEDICINE

## 2018-01-01 PROCEDURE — 87506 IADNA-DNA/RNA PROBE TQ 6-11: CPT | Performed by: EMERGENCY MEDICINE

## 2018-01-01 PROCEDURE — 25000125 ZZHC RX 250: Performed by: NURSE PRACTITIONER

## 2018-01-01 PROCEDURE — 25000132 ZZH RX MED GY IP 250 OP 250 PS 637: Performed by: HOSPITALIST

## 2018-01-01 PROCEDURE — 87804 INFLUENZA ASSAY W/OPTIC: CPT | Performed by: EMERGENCY MEDICINE

## 2018-01-01 PROCEDURE — 21400006 ZZH R&B CCU INTERMEDIATE UMMC

## 2018-01-01 PROCEDURE — 80048 BASIC METABOLIC PNL TOTAL CA: CPT | Performed by: PHYSICIAN ASSISTANT

## 2018-01-01 PROCEDURE — 86803 HEPATITIS C AB TEST: CPT | Performed by: FAMILY MEDICINE

## 2018-01-01 PROCEDURE — 87040 BLOOD CULTURE FOR BACTERIA: CPT | Mod: 91 | Performed by: EMERGENCY MEDICINE

## 2018-01-01 PROCEDURE — 94640 AIRWAY INHALATION TREATMENT: CPT

## 2018-01-01 PROCEDURE — 40000802 ZZH SITE CHECK

## 2018-01-01 PROCEDURE — 80053 COMPREHEN METABOLIC PANEL: CPT | Performed by: EMERGENCY MEDICINE

## 2018-01-01 PROCEDURE — 40000225 ZZH STATISTIC SLP WARD VISIT

## 2018-01-01 PROCEDURE — G0008 ADMIN INFLUENZA VIRUS VAC: HCPCS | Performed by: FAMILY MEDICINE

## 2018-01-01 PROCEDURE — 25000128 H RX IP 250 OP 636: Performed by: NURSE PRACTITIONER

## 2018-01-01 PROCEDURE — 83880 ASSAY OF NATRIURETIC PEPTIDE: CPT | Performed by: FAMILY MEDICINE

## 2018-01-01 PROCEDURE — 25000132 ZZH RX MED GY IP 250 OP 250 PS 637: Performed by: THORACIC SURGERY (CARDIOTHORACIC VASCULAR SURGERY)

## 2018-01-01 PROCEDURE — 86140 C-REACTIVE PROTEIN: CPT | Performed by: FAMILY MEDICINE

## 2018-01-01 PROCEDURE — 21000001 ZZH R&B HEART CARE

## 2018-01-01 PROCEDURE — 83036 HEMOGLOBIN GLYCOSYLATED A1C: CPT | Performed by: NURSE PRACTITIONER

## 2018-01-01 PROCEDURE — 74019 RADEX ABDOMEN 2 VIEWS: CPT

## 2018-01-01 PROCEDURE — 86140 C-REACTIVE PROTEIN: CPT | Performed by: EMERGENCY MEDICINE

## 2018-01-01 PROCEDURE — 99214 OFFICE O/P EST MOD 30 MIN: CPT | Performed by: NURSE PRACTITIONER

## 2018-01-01 PROCEDURE — A9585 GADOBUTROL INJECTION: HCPCS | Performed by: UROLOGY

## 2018-01-01 PROCEDURE — 25000132 ZZH RX MED GY IP 250 OP 250 PS 637: Mod: GY | Performed by: PHYSICIAN ASSISTANT

## 2018-01-01 PROCEDURE — 25000132 ZZH RX MED GY IP 250 OP 250 PS 637: Performed by: INTERNAL MEDICINE

## 2018-01-01 PROCEDURE — 80053 COMPREHEN METABOLIC PANEL: CPT | Performed by: FAMILY MEDICINE

## 2018-01-01 PROCEDURE — 96365 THER/PROPH/DIAG IV INF INIT: CPT

## 2018-01-01 PROCEDURE — 81001 URINALYSIS AUTO W/SCOPE: CPT | Performed by: PHYSICIAN ASSISTANT

## 2018-01-01 PROCEDURE — 25000125 ZZHC RX 250: Performed by: PHYSICIAN ASSISTANT

## 2018-01-01 PROCEDURE — 83880 ASSAY OF NATRIURETIC PEPTIDE: CPT | Performed by: EMERGENCY MEDICINE

## 2018-01-01 PROCEDURE — 27210995 ZZH RX 272: Performed by: INTERNAL MEDICINE

## 2018-01-01 PROCEDURE — 25000132 ZZH RX MED GY IP 250 OP 250 PS 637: Performed by: ANESTHESIOLOGY

## 2018-01-01 PROCEDURE — 99309 SBSQ NF CARE MODERATE MDM 30: CPT | Performed by: NURSE PRACTITIONER

## 2018-01-01 PROCEDURE — 93971 EXTREMITY STUDY: CPT | Mod: LT

## 2018-01-01 PROCEDURE — 87177 OVA AND PARASITES SMEARS: CPT | Performed by: FAMILY MEDICINE

## 2018-01-01 PROCEDURE — 87040 BLOOD CULTURE FOR BACTERIA: CPT | Performed by: EMERGENCY MEDICINE

## 2018-01-01 PROCEDURE — 80048 BASIC METABOLIC PNL TOTAL CA: CPT | Performed by: NURSE PRACTITIONER

## 2018-01-01 PROCEDURE — 96361 HYDRATE IV INFUSION ADD-ON: CPT

## 2018-01-01 PROCEDURE — 82550 ASSAY OF CK (CPK): CPT | Performed by: FAMILY MEDICINE

## 2018-01-01 PROCEDURE — 96374 THER/PROPH/DIAG INJ IV PUSH: CPT

## 2018-01-01 PROCEDURE — 99223 1ST HOSP IP/OBS HIGH 75: CPT | Mod: AI | Performed by: INTERNAL MEDICINE

## 2018-01-01 PROCEDURE — 87506 IADNA-DNA/RNA PROBE TQ 6-11: CPT | Performed by: FAMILY MEDICINE

## 2018-01-01 PROCEDURE — 83630 LACTOFERRIN FECAL (QUAL): CPT | Performed by: FAMILY MEDICINE

## 2018-01-01 PROCEDURE — 86140 C-REACTIVE PROTEIN: CPT | Performed by: SURGERY

## 2018-01-01 PROCEDURE — 96367 TX/PROPH/DG ADDL SEQ IV INF: CPT

## 2018-01-01 PROCEDURE — 99231 SBSQ HOSP IP/OBS SF/LOW 25: CPT | Performed by: INTERNAL MEDICINE

## 2018-01-01 PROCEDURE — 90715 TDAP VACCINE 7 YRS/> IM: CPT | Performed by: FAMILY MEDICINE

## 2018-01-01 PROCEDURE — 99202 OFFICE O/P NEW SF 15 MIN: CPT | Performed by: UROLOGY

## 2018-01-01 PROCEDURE — 99214 OFFICE O/P EST MOD 30 MIN: CPT | Performed by: INTERNAL MEDICINE

## 2018-01-01 PROCEDURE — 36415 COLL VENOUS BLD VENIPUNCTURE: CPT | Performed by: INTERNAL MEDICINE

## 2018-01-01 PROCEDURE — 40000141 ZZH STATISTIC PERIPHERAL IV START W/O US GUIDANCE

## 2018-01-01 PROCEDURE — 99207 ZZC CDG-HISTORY COMP: MEETS EXP. PROBLEM FOCUSED-DOWN CODED LACK OF ROS: CPT | Performed by: INTERNAL MEDICINE

## 2018-01-01 PROCEDURE — G9186 MOTOR SPEECH GOAL STATUS: HCPCS | Mod: GN,CK | Performed by: SPEECH-LANGUAGE PATHOLOGIST

## 2018-01-01 PROCEDURE — 71260 CT THORAX DX C+: CPT

## 2018-01-01 PROCEDURE — 96374 THER/PROPH/DIAG INJ IV PUSH: CPT | Mod: 59

## 2018-01-01 PROCEDURE — 99207 ZZC NO CHARGE NURSE ONLY: CPT

## 2018-01-01 PROCEDURE — 40000719 ZZHC STATISTIC PT DEPARTMENT NEURO VISIT: Performed by: PHYSICAL THERAPIST

## 2018-01-01 PROCEDURE — 99214 OFFICE O/P EST MOD 30 MIN: CPT | Mod: 25 | Performed by: FAMILY MEDICINE

## 2018-01-01 PROCEDURE — 25000128 H RX IP 250 OP 636: Performed by: PHYSICIAN ASSISTANT

## 2018-01-01 PROCEDURE — 73110 X-RAY EXAM OF WRIST: CPT | Mod: LT

## 2018-01-01 PROCEDURE — 86255 FLUORESCENT ANTIBODY SCREEN: CPT

## 2018-01-01 PROCEDURE — 82728 ASSAY OF FERRITIN: CPT | Performed by: FAMILY MEDICINE

## 2018-01-01 PROCEDURE — 25000132 ZZH RX MED GY IP 250 OP 250 PS 637: Performed by: EMERGENCY MEDICINE

## 2018-01-01 PROCEDURE — 96376 TX/PRO/DX INJ SAME DRUG ADON: CPT

## 2018-01-01 PROCEDURE — 85027 COMPLETE CBC AUTOMATED: CPT | Performed by: INTERNAL MEDICINE

## 2018-01-01 PROCEDURE — 80048 BASIC METABOLIC PNL TOTAL CA: CPT | Performed by: SURGERY

## 2018-01-01 PROCEDURE — 99213 OFFICE O/P EST LOW 20 MIN: CPT | Performed by: NURSE PRACTITIONER

## 2018-01-01 PROCEDURE — 87493 C DIFF AMPLIFIED PROBE: CPT | Performed by: FAMILY MEDICINE

## 2018-01-01 PROCEDURE — G0378 HOSPITAL OBSERVATION PER HR: HCPCS

## 2018-01-01 PROCEDURE — G0103 PSA SCREENING: HCPCS | Performed by: FAMILY MEDICINE

## 2018-01-01 PROCEDURE — 90670 PCV13 VACCINE IM: CPT | Performed by: FAMILY MEDICINE

## 2018-01-01 PROCEDURE — G0009 ADMIN PNEUMOCOCCAL VACCINE: HCPCS | Performed by: FAMILY MEDICINE

## 2018-01-01 PROCEDURE — 36592 COLLECT BLOOD FROM PICC: CPT | Performed by: SURGERY

## 2018-01-01 PROCEDURE — 80048 BASIC METABOLIC PNL TOTAL CA: CPT | Performed by: INTERNAL MEDICINE

## 2018-01-01 PROCEDURE — 83735 ASSAY OF MAGNESIUM: CPT | Performed by: SURGERY

## 2018-01-01 PROCEDURE — 90471 IMMUNIZATION ADMIN: CPT | Performed by: FAMILY MEDICINE

## 2018-01-01 PROCEDURE — 82803 BLOOD GASES ANY COMBINATION: CPT | Performed by: EMERGENCY MEDICINE

## 2018-01-01 PROCEDURE — 87329 GIARDIA AG IA: CPT | Mod: 59 | Performed by: FAMILY MEDICINE

## 2018-01-01 PROCEDURE — 87641 MR-STAPH DNA AMP PROBE: CPT | Performed by: INTERNAL MEDICINE

## 2018-01-01 PROCEDURE — 99213 OFFICE O/P EST LOW 20 MIN: CPT | Performed by: FAMILY MEDICINE

## 2018-01-01 PROCEDURE — 25000125 ZZHC RX 250: Performed by: STUDENT IN AN ORGANIZED HEALTH CARE EDUCATION/TRAINING PROGRAM

## 2018-01-01 PROCEDURE — 36415 COLL VENOUS BLD VENIPUNCTURE: CPT

## 2018-01-01 PROCEDURE — 85652 RBC SED RATE AUTOMATED: CPT | Performed by: EMERGENCY MEDICINE

## 2018-01-01 PROCEDURE — 97161 PT EVAL LOW COMPLEX 20 MIN: CPT | Mod: GP | Performed by: PHYSICAL THERAPIST

## 2018-01-01 PROCEDURE — 83605 ASSAY OF LACTIC ACID: CPT | Performed by: EMERGENCY MEDICINE

## 2018-01-01 PROCEDURE — 40000275 ZZH STATISTIC RCP TIME EA 10 MIN

## 2018-01-01 PROCEDURE — 99232 SBSQ HOSP IP/OBS MODERATE 35: CPT | Performed by: INTERNAL MEDICINE

## 2018-01-01 PROCEDURE — 40000264 ECHO COMPLETE WITH OPTISON

## 2018-01-01 PROCEDURE — 84145 PROCALCITONIN (PCT): CPT | Performed by: EMERGENCY MEDICINE

## 2018-01-01 PROCEDURE — 85379 FIBRIN DEGRADATION QUANT: CPT | Performed by: EMERGENCY MEDICINE

## 2018-01-01 PROCEDURE — 87086 URINE CULTURE/COLONY COUNT: CPT | Performed by: EMERGENCY MEDICINE

## 2018-01-01 PROCEDURE — 85027 COMPLETE CBC AUTOMATED: CPT | Performed by: SURGERY

## 2018-01-01 PROCEDURE — 92526 ORAL FUNCTION THERAPY: CPT | Mod: GN

## 2018-01-01 PROCEDURE — 92523 SPEECH SOUND LANG COMPREHEN: CPT | Mod: GN | Performed by: SPEECH-LANGUAGE PATHOLOGIST

## 2018-01-01 RX ORDER — MIRTAZAPINE 7.5 MG/1
22.5 TABLET, FILM COATED ORAL AT BEDTIME
Status: DISCONTINUED | OUTPATIENT
Start: 2018-01-01 | End: 2018-01-01 | Stop reason: HOSPADM

## 2018-01-01 RX ORDER — HYDRALAZINE HYDROCHLORIDE 20 MG/ML
10 INJECTION INTRAMUSCULAR; INTRAVENOUS EVERY 4 HOURS PRN
Status: DISCONTINUED | OUTPATIENT
Start: 2018-01-01 | End: 2018-01-01

## 2018-01-01 RX ORDER — LORAZEPAM 2 MG/ML
.25-.5 CONCENTRATE ORAL EVERY 4 HOURS PRN
Qty: 30 ML | Refills: 1 | Status: SHIPPED | OUTPATIENT
Start: 2018-01-01 | End: 2019-01-01

## 2018-01-01 RX ORDER — AMOXICILLIN 250 MG
1 CAPSULE ORAL 2 TIMES DAILY PRN
Status: DISCONTINUED | OUTPATIENT
Start: 2018-01-01 | End: 2018-01-01 | Stop reason: HOSPADM

## 2018-01-01 RX ORDER — ACETAMINOPHEN 650 MG/1
650 SUPPOSITORY RECTAL EVERY 4 HOURS PRN
Qty: 12 SUPPOSITORY | Refills: 1 | Status: SHIPPED | OUTPATIENT
Start: 2018-01-01 | End: 2018-01-01

## 2018-01-01 RX ORDER — OLANZAPINE 2.5 MG/1
TABLET, FILM COATED ORAL
Qty: 90 TABLET | Refills: 0 | Status: SHIPPED | OUTPATIENT
Start: 2018-01-01 | End: 2018-01-01

## 2018-01-01 RX ORDER — BUMETANIDE 1 MG/1
TABLET ORAL
COMMUNITY
Start: 2018-01-01 | End: 2018-01-01

## 2018-01-01 RX ORDER — AMLODIPINE BESYLATE 5 MG/1
5 TABLET ORAL DAILY
Status: DISCONTINUED | OUTPATIENT
Start: 2018-01-01 | End: 2018-01-01

## 2018-01-01 RX ORDER — AMPICILLIN 2 G/1
2 INJECTION, POWDER, FOR SOLUTION INTRAVENOUS ONCE
Qty: 8 ML | Refills: 0 | COMMUNITY
Start: 2018-01-01 | End: 2018-01-01

## 2018-01-01 RX ORDER — BISACODYL 10 MG
10 SUPPOSITORY, RECTAL RECTAL DAILY PRN
Status: DISCONTINUED | OUTPATIENT
Start: 2018-01-01 | End: 2018-01-01 | Stop reason: HOSPADM

## 2018-01-01 RX ORDER — AMPICILLIN 2 G/1
INJECTION, POWDER, FOR SOLUTION INTRAVENOUS
Qty: 1 EACH | Refills: 0 | COMMUNITY
Start: 2018-01-01 | End: 2018-01-01

## 2018-01-01 RX ORDER — CEFAZOLIN SODIUM 1 G/50ML
2000 SOLUTION INTRAVENOUS ONCE
Status: CANCELLED
Start: 2018-01-01 | End: 2018-01-01

## 2018-01-01 RX ORDER — IOPAMIDOL 755 MG/ML
80 INJECTION, SOLUTION INTRAVASCULAR ONCE
Status: COMPLETED | OUTPATIENT
Start: 2018-01-01 | End: 2018-01-01

## 2018-01-01 RX ORDER — IOPAMIDOL 755 MG/ML
500 INJECTION, SOLUTION INTRAVASCULAR ONCE
Status: COMPLETED | OUTPATIENT
Start: 2018-01-01 | End: 2018-01-01

## 2018-01-01 RX ORDER — AMPICILLIN SODIUM 2 G/1
2 INJECTION, POWDER, FOR SOLUTION INTRAVENOUS ONCE
Qty: 8 ML | Refills: 0 | COMMUNITY
Start: 2018-01-01 | End: 2018-01-01

## 2018-01-01 RX ORDER — LABETALOL HYDROCHLORIDE 5 MG/ML
10 INJECTION, SOLUTION INTRAVENOUS ONCE
Status: DISCONTINUED | OUTPATIENT
Start: 2018-01-01 | End: 2018-01-01

## 2018-01-01 RX ORDER — ONDANSETRON 2 MG/ML
4 INJECTION INTRAMUSCULAR; INTRAVENOUS EVERY 6 HOURS PRN
Status: DISCONTINUED | OUTPATIENT
Start: 2018-01-01 | End: 2018-01-01 | Stop reason: HOSPADM

## 2018-01-01 RX ORDER — POTASSIUM CHLORIDE 1500 MG/1
20 TABLET, EXTENDED RELEASE ORAL 2 TIMES DAILY
Qty: 60 TABLET | Refills: 3 | Status: SHIPPED | OUTPATIENT
Start: 2018-01-01 | End: 2018-01-01 | Stop reason: DRUGHIGH

## 2018-01-01 RX ORDER — LOSARTAN POTASSIUM 25 MG/1
25 TABLET ORAL 2 TIMES DAILY
Status: DISCONTINUED | OUTPATIENT
Start: 2018-01-01 | End: 2018-01-01

## 2018-01-01 RX ORDER — BUMETANIDE 1 MG/1
1 TABLET ORAL DAILY
Status: DISCONTINUED | OUTPATIENT
Start: 2018-01-01 | End: 2018-01-01

## 2018-01-01 RX ORDER — POLYETHYLENE GLYCOL 3350 17 G/17G
17 POWDER, FOR SOLUTION ORAL 2 TIMES DAILY
Qty: 60 PACKET | Refills: 0 | Status: SHIPPED | OUTPATIENT
Start: 2018-01-01 | End: 2019-01-01

## 2018-01-01 RX ORDER — SIMETHICONE 80 MG
80 TABLET,CHEWABLE ORAL EVERY 6 HOURS PRN
Status: DISCONTINUED | OUTPATIENT
Start: 2018-01-01 | End: 2018-01-01 | Stop reason: HOSPADM

## 2018-01-01 RX ORDER — LOSARTAN POTASSIUM 50 MG/1
50 TABLET ORAL DAILY
Qty: 30 TABLET | Refills: 1 | Status: SHIPPED | OUTPATIENT
Start: 2018-01-01 | End: 2018-01-01

## 2018-01-01 RX ORDER — ACETAMINOPHEN 325 MG/1
650 TABLET ORAL EVERY 4 HOURS PRN
Status: DISCONTINUED | OUTPATIENT
Start: 2018-01-01 | End: 2018-01-01 | Stop reason: HOSPADM

## 2018-01-01 RX ORDER — AMOXICILLIN 250 MG
2 CAPSULE ORAL 2 TIMES DAILY PRN
Status: DISCONTINUED | OUTPATIENT
Start: 2018-01-01 | End: 2018-01-01 | Stop reason: HOSPADM

## 2018-01-01 RX ORDER — FAMOTIDINE 20 MG/1
20 TABLET, FILM COATED ORAL 2 TIMES DAILY
Status: DISCONTINUED | OUTPATIENT
Start: 2018-01-01 | End: 2018-01-01 | Stop reason: HOSPADM

## 2018-01-01 RX ORDER — NALOXONE HYDROCHLORIDE 0.4 MG/ML
.1-.4 INJECTION, SOLUTION INTRAMUSCULAR; INTRAVENOUS; SUBCUTANEOUS
Status: DISCONTINUED | OUTPATIENT
Start: 2018-01-01 | End: 2018-01-01 | Stop reason: HOSPADM

## 2018-01-01 RX ORDER — ATORVASTATIN CALCIUM 40 MG/1
40 TABLET, FILM COATED ORAL DAILY
Status: DISCONTINUED | OUTPATIENT
Start: 2018-01-01 | End: 2018-01-01 | Stop reason: HOSPADM

## 2018-01-01 RX ORDER — OLANZAPINE 2.5 MG/1
TABLET, FILM COATED ORAL
Qty: 90 TABLET | Refills: 1 | Status: SHIPPED | OUTPATIENT
Start: 2018-01-01 | End: 2018-01-01

## 2018-01-01 RX ORDER — POTASSIUM CHLORIDE 1500 MG/1
20 TABLET, EXTENDED RELEASE ORAL DAILY
Qty: 30 TABLET | Refills: 3 | Status: SHIPPED | OUTPATIENT
Start: 2018-01-01 | End: 2018-01-01

## 2018-01-01 RX ORDER — CEFAZOLIN SODIUM 1 G/50ML
2000 SOLUTION INTRAVENOUS EVERY 12 HOURS
Qty: 1 EACH | Refills: 1 | COMMUNITY
Start: 2018-01-01 | End: 2018-01-01

## 2018-01-01 RX ORDER — ONDANSETRON 4 MG/1
4 TABLET, ORALLY DISINTEGRATING ORAL EVERY 6 HOURS PRN
Status: DISCONTINUED | OUTPATIENT
Start: 2018-01-01 | End: 2018-01-01 | Stop reason: HOSPADM

## 2018-01-01 RX ORDER — POTASSIUM CHLORIDE 1500 MG/1
TABLET, EXTENDED RELEASE ORAL
Refills: 0 | COMMUNITY
Start: 2018-01-01 | End: 2018-01-01

## 2018-01-01 RX ORDER — HALOPERIDOL 2 MG/ML
.5-1 SOLUTION ORAL EVERY 6 HOURS PRN
Qty: 30 ML | Refills: 1 | Status: SHIPPED | OUTPATIENT
Start: 2018-01-01 | End: 2019-01-01

## 2018-01-01 RX ORDER — ONDANSETRON 2 MG/ML
4 INJECTION INTRAMUSCULAR; INTRAVENOUS ONCE
Status: COMPLETED | OUTPATIENT
Start: 2018-01-01 | End: 2018-01-01

## 2018-01-01 RX ORDER — MIRTAZAPINE 7.5 MG/1
TABLET, FILM COATED ORAL
Qty: 90 TABLET | Refills: 0 | OUTPATIENT
Start: 2018-01-01

## 2018-01-01 RX ORDER — METOCLOPRAMIDE HYDROCHLORIDE 5 MG/ML
5 INJECTION INTRAMUSCULAR; INTRAVENOUS EVERY 6 HOURS PRN
Status: DISCONTINUED | OUTPATIENT
Start: 2018-01-01 | End: 2018-01-01 | Stop reason: HOSPADM

## 2018-01-01 RX ORDER — FAMOTIDINE 20 MG/1
20 TABLET, FILM COATED ORAL DAILY
Qty: 90 TABLET | Refills: 1 | Status: SHIPPED | OUTPATIENT
Start: 2018-01-01 | End: 2018-01-01

## 2018-01-01 RX ORDER — CARVEDILOL 12.5 MG/1
12.5 TABLET ORAL 2 TIMES DAILY
Status: DISCONTINUED | OUTPATIENT
Start: 2018-01-01 | End: 2018-01-01 | Stop reason: HOSPADM

## 2018-01-01 RX ORDER — BUMETANIDE 1 MG/1
TABLET ORAL
Qty: 90 TABLET | Refills: 0 | OUTPATIENT
Start: 2018-01-01

## 2018-01-01 RX ORDER — CARVEDILOL 12.5 MG/1
12.5 TABLET ORAL 2 TIMES DAILY WITH MEALS
Status: DISCONTINUED | OUTPATIENT
Start: 2018-01-01 | End: 2018-01-01 | Stop reason: HOSPADM

## 2018-01-01 RX ORDER — FAMOTIDINE 20 MG/1
20 TABLET, FILM COATED ORAL DAILY
Status: DISCONTINUED | OUTPATIENT
Start: 2018-01-01 | End: 2018-01-01 | Stop reason: HOSPADM

## 2018-01-01 RX ORDER — ACETAMINOPHEN 325 MG/1
325 TABLET ORAL EVERY 4 HOURS PRN
Status: DISCONTINUED | OUTPATIENT
Start: 2018-01-01 | End: 2018-01-01 | Stop reason: HOSPADM

## 2018-01-01 RX ORDER — HYDRALAZINE HYDROCHLORIDE 20 MG/ML
10 INJECTION INTRAMUSCULAR; INTRAVENOUS EVERY 4 HOURS PRN
Status: DISCONTINUED | OUTPATIENT
Start: 2018-01-01 | End: 2018-01-01 | Stop reason: HOSPADM

## 2018-01-01 RX ORDER — CEFAZOLIN SODIUM 1 G/50ML
2000 SOLUTION INTRAVENOUS ONCE
Qty: 1 EACH | Refills: 0 | COMMUNITY
Start: 2018-01-01 | End: 2018-01-01

## 2018-01-01 RX ORDER — LOSARTAN POTASSIUM 50 MG/1
50 TABLET ORAL 2 TIMES DAILY
Status: DISCONTINUED | OUTPATIENT
Start: 2018-01-01 | End: 2018-01-01 | Stop reason: HOSPADM

## 2018-01-01 RX ORDER — MIRTAZAPINE 30 MG/1
30 TABLET, FILM COATED ORAL AT BEDTIME
Qty: 90 TABLET | Refills: 3 | Status: SHIPPED | OUTPATIENT
Start: 2018-01-01 | End: 2018-01-01

## 2018-01-01 RX ORDER — PNV NO.95/FERROUS FUM/FOLIC AC 28MG-0.8MG
1 TABLET ORAL EVERY EVENING
COMMUNITY
End: 2018-01-01

## 2018-01-01 RX ORDER — MORPHINE SULFATE 100 MG/5ML
2.5-5 SOLUTION ORAL
Qty: 30 ML | Refills: 0 | Status: SHIPPED | OUTPATIENT
Start: 2018-01-01 | End: 2019-01-01

## 2018-01-01 RX ORDER — AMLODIPINE BESYLATE 10 MG/1
10 TABLET ORAL DAILY
Qty: 30 TABLET | Refills: 0 | Status: SHIPPED | OUTPATIENT
Start: 2018-01-01 | End: 2019-01-01

## 2018-01-01 RX ORDER — ALBUTEROL SULFATE 90 UG/1
2 AEROSOL, METERED RESPIRATORY (INHALATION) EVERY 6 HOURS PRN
Qty: 1 INHALER | Refills: 0 | Status: SHIPPED | OUTPATIENT
Start: 2018-01-01 | End: 2018-01-01

## 2018-01-01 RX ORDER — SODIUM CHLORIDE FOR INHALATION 3 %
3 VIAL, NEBULIZER (ML) INHALATION 3 TIMES DAILY PRN
Status: DISCONTINUED | OUTPATIENT
Start: 2018-01-01 | End: 2018-01-04 | Stop reason: HOSPADM

## 2018-01-01 RX ORDER — HYDRALAZINE HYDROCHLORIDE 10 MG/1
10 TABLET, FILM COATED ORAL 2 TIMES DAILY PRN
Qty: 90 TABLET | Refills: 0 | Status: SHIPPED | OUTPATIENT
Start: 2018-01-01 | End: 2019-01-01

## 2018-01-01 RX ORDER — CALCIUM CARBONATE 500 MG/1
500 TABLET, CHEWABLE ORAL DAILY PRN
Status: DISCONTINUED | OUTPATIENT
Start: 2018-01-01 | End: 2018-01-01 | Stop reason: HOSPADM

## 2018-01-01 RX ORDER — LOSARTAN POTASSIUM 50 MG/1
50 TABLET ORAL 2 TIMES DAILY
Qty: 180 TABLET | Refills: 1 | Status: SHIPPED | OUTPATIENT
Start: 2018-01-01

## 2018-01-01 RX ORDER — MIRTAZAPINE 7.5 MG/1
22.5 TABLET, FILM COATED ORAL AT BEDTIME
Qty: 90 TABLET | Refills: 3 | Status: SHIPPED | OUTPATIENT
Start: 2018-01-01 | End: 2018-01-01

## 2018-01-01 RX ORDER — CARVEDILOL 6.25 MG/1
6.25 TABLET ORAL 2 TIMES DAILY WITH MEALS
Status: DISCONTINUED | OUTPATIENT
Start: 2018-01-01 | End: 2018-01-04 | Stop reason: HOSPADM

## 2018-01-01 RX ORDER — FAMOTIDINE 20 MG/1
20 TABLET, FILM COATED ORAL DAILY
Qty: 60 TABLET | Refills: 1 | COMMUNITY
Start: 2018-01-01 | End: 2018-01-01

## 2018-01-01 RX ORDER — CARVEDILOL 12.5 MG/1
12.5 TABLET ORAL 2 TIMES DAILY
Qty: 180 TABLET | Refills: 3 | Status: SHIPPED | OUTPATIENT
Start: 2018-01-01

## 2018-01-01 RX ORDER — GADOBUTROL 604.72 MG/ML
10 INJECTION INTRAVENOUS ONCE
Status: COMPLETED | OUTPATIENT
Start: 2018-01-01 | End: 2018-01-01

## 2018-01-01 RX ORDER — SODIUM CHLORIDE 9 MG/ML
INJECTION, SOLUTION INTRAVENOUS CONTINUOUS
Status: DISCONTINUED | OUTPATIENT
Start: 2018-01-01 | End: 2018-01-01 | Stop reason: HOSPADM

## 2018-01-01 RX ORDER — ONDANSETRON 2 MG/ML
4 INJECTION INTRAMUSCULAR; INTRAVENOUS EVERY 6 HOURS PRN
Status: DISCONTINUED | OUTPATIENT
Start: 2018-01-01 | End: 2018-01-01

## 2018-01-01 RX ORDER — HYDROCODONE BITARTRATE AND ACETAMINOPHEN 5; 325 MG/1; MG/1
1 TABLET ORAL EVERY 6 HOURS PRN
Qty: 8 TABLET | Refills: 0 | Status: SHIPPED | OUTPATIENT
Start: 2018-01-01 | End: 2018-01-01

## 2018-01-01 RX ORDER — IOPAMIDOL 755 MG/ML
500 INJECTION, SOLUTION INTRAVASCULAR ONCE
Status: DISCONTINUED | OUTPATIENT
Start: 2018-01-01 | End: 2018-01-01 | Stop reason: HOSPADM

## 2018-01-01 RX ORDER — AMLODIPINE BESYLATE 10 MG/1
10 TABLET ORAL DAILY
Status: DISCONTINUED | OUTPATIENT
Start: 2018-01-01 | End: 2018-01-01 | Stop reason: HOSPADM

## 2018-01-01 RX ORDER — LABETALOL HYDROCHLORIDE 5 MG/ML
20 INJECTION, SOLUTION INTRAVENOUS ONCE
Status: COMPLETED | OUTPATIENT
Start: 2018-01-01 | End: 2018-01-01

## 2018-01-01 RX ORDER — CEFAZOLIN SODIUM 1 G/50ML
2000 SOLUTION INTRAVENOUS ONCE
Status: COMPLETED | OUTPATIENT
Start: 2018-01-01 | End: 2018-01-01

## 2018-01-01 RX ORDER — BISACODYL 5 MG/1
5 TABLET, DELAYED RELEASE ORAL 2 TIMES DAILY PRN
COMMUNITY
End: 2018-01-01

## 2018-01-01 RX ORDER — ONDANSETRON 4 MG/1
TABLET, FILM COATED ORAL
Qty: 20 TABLET | Refills: 0 | Status: SHIPPED | OUTPATIENT
Start: 2018-01-01

## 2018-01-01 RX ORDER — DIPHENHYDRAMINE HYDROCHLORIDE AND LIDOCAINE HYDROCHLORIDE AND ALUMINUM HYDROXIDE AND MAGNESIUM HYDRO
10 KIT EVERY 6 HOURS PRN
Status: DISCONTINUED | OUTPATIENT
Start: 2018-01-01 | End: 2018-01-01 | Stop reason: HOSPADM

## 2018-01-01 RX ORDER — POTASSIUM CHLORIDE 750 MG/1
20 TABLET, EXTENDED RELEASE ORAL 2 TIMES DAILY
Status: DISCONTINUED | OUTPATIENT
Start: 2018-01-01 | End: 2018-01-01 | Stop reason: HOSPADM

## 2018-01-01 RX ORDER — CEFAZOLIN SODIUM 2 G/100ML
2 INJECTION, SOLUTION INTRAVENOUS EVERY 8 HOURS
Status: DISCONTINUED | OUTPATIENT
Start: 2018-01-01 | End: 2018-01-01

## 2018-01-01 RX ORDER — MIRTAZAPINE 15 MG/1
15 TABLET, FILM COATED ORAL AT BEDTIME
COMMUNITY
Start: 2018-01-01 | End: 2018-01-01

## 2018-01-01 RX ORDER — BUMETANIDE 1 MG/1
1 TABLET ORAL DAILY
COMMUNITY
End: 2018-01-01

## 2018-01-01 RX ORDER — SENNOSIDES 8.6 MG
1-2 TABLET ORAL 2 TIMES DAILY
Qty: 100 TABLET | Refills: 1 | Status: SHIPPED | OUTPATIENT
Start: 2018-01-01 | End: 2019-01-01

## 2018-01-01 RX ORDER — CARVEDILOL 12.5 MG/1
TABLET ORAL
Qty: 180 TABLET | Refills: 1 | Status: SHIPPED | OUTPATIENT
Start: 2018-01-01 | End: 2018-01-01

## 2018-01-01 RX ORDER — CEFAZOLIN SODIUM 2 G/100ML
2 INJECTION, SOLUTION INTRAVENOUS ONCE
Status: COMPLETED | OUTPATIENT
Start: 2018-01-01 | End: 2018-01-01

## 2018-01-01 RX ORDER — ATROPINE SULFATE 10 MG/ML
2-4 SOLUTION/ DROPS OPHTHALMIC
Qty: 5 ML | Refills: 1 | Status: SHIPPED | OUTPATIENT
Start: 2018-01-01 | End: 2018-01-01

## 2018-01-01 RX ORDER — POTASSIUM CHLORIDE 1500 MG/1
TABLET, EXTENDED RELEASE ORAL
Qty: 60 TABLET | Refills: 3 | Status: SHIPPED | OUTPATIENT
Start: 2018-01-01 | End: 2018-01-01

## 2018-01-01 RX ORDER — BUMETANIDE 1 MG/1
TABLET ORAL
Qty: 90 TABLET | Refills: 1 | Status: SHIPPED | OUTPATIENT
Start: 2018-01-01 | End: 2018-01-01 | Stop reason: ALTCHOICE

## 2018-01-01 RX ORDER — FERROUS SULFATE 325(65) MG
325 TABLET ORAL EVERY EVENING
Status: DISCONTINUED | OUTPATIENT
Start: 2018-01-01 | End: 2018-01-01 | Stop reason: HOSPADM

## 2018-01-01 RX ORDER — LOSARTAN POTASSIUM 50 MG/1
TABLET ORAL
Qty: 90 TABLET | Refills: 3 | Status: SHIPPED | OUTPATIENT
Start: 2018-01-01 | End: 2018-01-01

## 2018-01-01 RX ORDER — OLANZAPINE 2.5 MG/1
2.5 TABLET, FILM COATED ORAL 2 TIMES DAILY
Status: DISCONTINUED | OUTPATIENT
Start: 2018-01-01 | End: 2018-01-04 | Stop reason: HOSPADM

## 2018-01-01 RX ORDER — FUROSEMIDE 10 MG/ML
20 INJECTION INTRAMUSCULAR; INTRAVENOUS ONCE
Status: COMPLETED | OUTPATIENT
Start: 2018-01-01 | End: 2018-01-01

## 2018-01-01 RX ORDER — ASPIRIN 81 MG/1
81 TABLET, CHEWABLE ORAL DAILY
Status: DISCONTINUED | OUTPATIENT
Start: 2018-01-01 | End: 2018-01-01 | Stop reason: HOSPADM

## 2018-01-01 RX ORDER — ONDANSETRON 4 MG/1
4 TABLET, ORALLY DISINTEGRATING ORAL EVERY 6 HOURS PRN
Status: DISCONTINUED | OUTPATIENT
Start: 2018-01-01 | End: 2018-01-01

## 2018-01-01 RX ORDER — AZITHROMYCIN 250 MG/1
TABLET, FILM COATED ORAL
Qty: 6 TABLET | Refills: 0 | Status: SHIPPED | OUTPATIENT
Start: 2018-01-01 | End: 2018-01-01

## 2018-01-01 RX ORDER — METOCLOPRAMIDE 5 MG/1
5 TABLET ORAL EVERY 6 HOURS PRN
Status: DISCONTINUED | OUTPATIENT
Start: 2018-01-01 | End: 2018-01-01 | Stop reason: HOSPADM

## 2018-01-01 RX ORDER — CEFAZOLIN SODIUM 1 G/50ML
2000 SOLUTION INTRAVENOUS ONCE
Qty: 500 ML | Refills: 0 | COMMUNITY
Start: 2018-01-01 | End: 2018-01-01

## 2018-01-01 RX ORDER — PROCHLORPERAZINE 25 MG
12.5 SUPPOSITORY, RECTAL RECTAL EVERY 12 HOURS PRN
Status: DISCONTINUED | OUTPATIENT
Start: 2018-01-01 | End: 2018-01-01 | Stop reason: HOSPADM

## 2018-01-01 RX ORDER — POLYETHYLENE GLYCOL 3350 17 G/17G
17 POWDER, FOR SOLUTION ORAL 2 TIMES DAILY
Status: DISCONTINUED | OUTPATIENT
Start: 2018-01-01 | End: 2018-01-01 | Stop reason: HOSPADM

## 2018-01-01 RX ORDER — METOCLOPRAMIDE 5 MG/1
5 TABLET ORAL EVERY 6 HOURS PRN
Qty: 30 TABLET | Refills: 1 | Status: SHIPPED | OUTPATIENT
Start: 2018-01-01 | End: 2018-01-01

## 2018-01-01 RX ORDER — BUMETANIDE 1 MG/1
TABLET ORAL
Qty: 90 TABLET | Refills: 3 | Status: SHIPPED | OUTPATIENT
Start: 2018-01-01 | End: 2018-01-01

## 2018-01-01 RX ORDER — ALBUTEROL SULFATE 0.83 MG/ML
1 SOLUTION RESPIRATORY (INHALATION) 4 TIMES DAILY
COMMUNITY
End: 2018-01-01

## 2018-01-01 RX ORDER — DANTROLENE SODIUM 25 MG/1
25 CAPSULE ORAL EVERY OTHER DAY
COMMUNITY
End: 2018-01-01

## 2018-01-01 RX ORDER — SODIUM CHLORIDE 9 MG/ML
1000 INJECTION, SOLUTION INTRAVENOUS CONTINUOUS
Status: DISCONTINUED | OUTPATIENT
Start: 2018-01-01 | End: 2018-01-01 | Stop reason: HOSPADM

## 2018-01-01 RX ORDER — POLYETHYLENE GLYCOL 3350 17 G/17G
17 POWDER, FOR SOLUTION ORAL
COMMUNITY
End: 2018-01-01

## 2018-01-01 RX ORDER — SODIUM CHLORIDE 9 MG/ML
1000 INJECTION, SOLUTION INTRAVENOUS CONTINUOUS
Status: DISCONTINUED | OUTPATIENT
Start: 2018-01-01 | End: 2018-01-01

## 2018-01-01 RX ORDER — LIDOCAINE 40 MG/G
CREAM TOPICAL
Status: DISCONTINUED | OUTPATIENT
Start: 2018-01-01 | End: 2018-01-01 | Stop reason: HOSPADM

## 2018-01-01 RX ORDER — BISACODYL 10 MG
SUPPOSITORY, RECTAL RECTAL
Qty: 12 SUPPOSITORY | Refills: 1 | Status: SHIPPED | OUTPATIENT
Start: 2018-01-01 | End: 2019-01-01

## 2018-01-01 RX ORDER — POLYETHYLENE GLYCOL 3350 17 G/17G
17 POWDER, FOR SOLUTION ORAL DAILY PRN
Status: DISCONTINUED | OUTPATIENT
Start: 2018-01-01 | End: 2018-01-01 | Stop reason: HOSPADM

## 2018-01-01 RX ORDER — CARVEDILOL 3.12 MG/1
3.12 TABLET ORAL ONCE
Status: COMPLETED | OUTPATIENT
Start: 2018-01-01 | End: 2018-01-01

## 2018-01-01 RX ORDER — CARBOXYMETHYLCELLULOSE SODIUM 5 MG/ML
1 SOLUTION/ DROPS OPHTHALMIC 3 TIMES DAILY PRN
Status: DISCONTINUED | OUTPATIENT
Start: 2018-01-01 | End: 2018-01-01 | Stop reason: HOSPADM

## 2018-01-01 RX ORDER — PROCHLORPERAZINE MALEATE 5 MG
5 TABLET ORAL EVERY 6 HOURS PRN
Status: DISCONTINUED | OUTPATIENT
Start: 2018-01-01 | End: 2018-01-01 | Stop reason: HOSPADM

## 2018-01-01 RX ORDER — CARVEDILOL 12.5 MG/1
6.25 TABLET ORAL 2 TIMES DAILY
Qty: 180 TABLET | Refills: 1 | COMMUNITY
Start: 2018-01-01 | End: 2018-01-01

## 2018-01-01 RX ORDER — FAMOTIDINE 20 MG/1
TABLET, FILM COATED ORAL
Qty: 90 TABLET | Refills: 1 | Status: SHIPPED | OUTPATIENT
Start: 2018-01-01

## 2018-01-01 RX ORDER — ALBUTEROL SULFATE 90 UG/1
2 AEROSOL, METERED RESPIRATORY (INHALATION) EVERY 6 HOURS PRN
Qty: 1 INHALER | Refills: 3 | Status: SHIPPED | OUTPATIENT
Start: 2018-01-01

## 2018-01-01 RX ORDER — ONDANSETRON 2 MG/ML
4 INJECTION INTRAMUSCULAR; INTRAVENOUS EVERY 30 MIN PRN
Status: COMPLETED | OUTPATIENT
Start: 2018-01-01 | End: 2018-01-01

## 2018-01-01 RX ORDER — OLANZAPINE 2.5 MG/1
2.5 TABLET, FILM COATED ORAL AT BEDTIME
Status: DISCONTINUED | OUTPATIENT
Start: 2018-01-01 | End: 2018-01-01 | Stop reason: HOSPADM

## 2018-01-01 RX ADMIN — LOSARTAN POTASSIUM 50 MG: 50 TABLET ORAL at 08:50

## 2018-01-01 RX ADMIN — FUROSEMIDE 20 MG: 10 INJECTION, SOLUTION INTRAVENOUS at 20:23

## 2018-01-01 RX ADMIN — ATORVASTATIN CALCIUM 40 MG: 40 TABLET, FILM COATED ORAL at 08:50

## 2018-01-01 RX ADMIN — ACETAMINOPHEN 650 MG: 160 SUSPENSION ORAL at 21:31

## 2018-01-01 RX ADMIN — FAMOTIDINE 20 MG: 20 TABLET ORAL at 09:24

## 2018-01-01 RX ADMIN — POTASSIUM CHLORIDE 20 MEQ: 1.5 POWDER, FOR SOLUTION ORAL at 11:34

## 2018-01-01 RX ADMIN — POTASSIUM CHLORIDE 20 MEQ: 20 SOLUTION ORAL at 08:36

## 2018-01-01 RX ADMIN — AMPICILLIN SODIUM 2 G: 2 INJECTION, POWDER, FOR SOLUTION INTRAVENOUS at 11:56

## 2018-01-01 RX ADMIN — CARVEDILOL 12.5 MG: 12.5 TABLET, FILM COATED ORAL at 09:23

## 2018-01-01 RX ADMIN — ALBUTEROL SULFATE 2.5 MG: 2.5 SOLUTION RESPIRATORY (INHALATION) at 17:04

## 2018-01-01 RX ADMIN — SODIUM CHLORIDE 92 ML: 9 INJECTION, SOLUTION INTRAVENOUS at 14:32

## 2018-01-01 RX ADMIN — VANCOMYCIN HYDROCHLORIDE 2000 MG: 1 INJECTION, POWDER, LYOPHILIZED, FOR SOLUTION INTRAVENOUS at 10:52

## 2018-01-01 RX ADMIN — FAMOTIDINE 20 MG: 20 TABLET ORAL at 08:37

## 2018-01-01 RX ADMIN — AMLODIPINE BESYLATE 5 MG: 5 TABLET ORAL at 09:13

## 2018-01-01 RX ADMIN — LOSARTAN POTASSIUM 50 MG: 50 TABLET ORAL at 09:17

## 2018-01-01 RX ADMIN — NAFCILLIN SODIUM 2 G: 2 INJECTION, POWDER, LYOPHILIZED, FOR SOLUTION INTRAMUSCULAR; INTRAVENOUS at 21:24

## 2018-01-01 RX ADMIN — SODIUM CHLORIDE 63 ML: 9 INJECTION, SOLUTION INTRAVENOUS at 15:41

## 2018-01-01 RX ADMIN — FAMOTIDINE 20 MG: 20 TABLET ORAL at 09:32

## 2018-01-01 RX ADMIN — VANCOMYCIN HYDROCHLORIDE 2 G: 100 INJECTION, POWDER, LYOPHILIZED, FOR SOLUTION INTRAVENOUS at 10:28

## 2018-01-01 RX ADMIN — ATORVASTATIN CALCIUM 40 MG: 40 TABLET, FILM COATED ORAL at 09:32

## 2018-01-01 RX ADMIN — IOPAMIDOL 73 ML: 755 INJECTION, SOLUTION INTRAVENOUS at 14:32

## 2018-01-01 RX ADMIN — ACETAMINOPHEN: 325 TABLET, FILM COATED ORAL at 01:46

## 2018-01-01 RX ADMIN — LOSARTAN POTASSIUM 25 MG: 25 TABLET ORAL at 21:33

## 2018-01-01 RX ADMIN — LOSARTAN POTASSIUM 25 MG: 25 TABLET ORAL at 08:36

## 2018-01-01 RX ADMIN — CEFEPIME 2 G: 1 INJECTION, SOLUTION INTRAVENOUS at 21:22

## 2018-01-01 RX ADMIN — FLUOXETINE 20 MG: 20 CAPSULE ORAL at 09:13

## 2018-01-01 RX ADMIN — KETOTIFEN FUMARATE 1 DROP: 0.25 SOLUTION/ DROPS OPHTHALMIC at 15:36

## 2018-01-01 RX ADMIN — LOSARTAN POTASSIUM 50 MG: 50 TABLET ORAL at 20:52

## 2018-01-01 RX ADMIN — Medication 300 MG: at 21:30

## 2018-01-01 RX ADMIN — BISACODYL 10 MG: 10 SUPPOSITORY RECTAL at 17:51

## 2018-01-01 RX ADMIN — POTASSIUM CHLORIDE 20 MEQ: 20 SOLUTION ORAL at 21:31

## 2018-01-01 RX ADMIN — DIPHENHYDRAMINE HYDROCHLORIDE AND LIDOCAINE HYDROCHLORIDE AND ALUMINUM HYDROXIDE AND MAGNESIUM HYDRO 10 ML: KIT at 14:45

## 2018-01-01 RX ADMIN — CARVEDILOL 6.25 MG: 6.25 TABLET, FILM COATED ORAL at 18:35

## 2018-01-01 RX ADMIN — ACETAMINOPHEN 325 MG: 325 TABLET, FILM COATED ORAL at 05:50

## 2018-01-01 RX ADMIN — CEFEPIME 2 G: 1 INJECTION, SOLUTION INTRAVENOUS at 05:21

## 2018-01-01 RX ADMIN — IOPAMIDOL 92 ML: 755 INJECTION, SOLUTION INTRAVENOUS at 15:41

## 2018-01-01 RX ADMIN — IOPAMIDOL 80 ML: 755 INJECTION, SOLUTION INTRAVENOUS at 20:15

## 2018-01-01 RX ADMIN — OLANZAPINE 2.5 MG: 2.5 TABLET, FILM COATED ORAL at 21:32

## 2018-01-01 RX ADMIN — CARVEDILOL 12.5 MG: 12.5 TABLET, FILM COATED ORAL at 09:17

## 2018-01-01 RX ADMIN — POTASSIUM CHLORIDE 20 MEQ: 750 TABLET, EXTENDED RELEASE ORAL at 09:24

## 2018-01-01 RX ADMIN — ASPIRIN 81 MG 81 MG: 81 TABLET ORAL at 09:17

## 2018-01-01 RX ADMIN — CEFAZOLIN SODIUM 2 G: 2 INJECTION, SOLUTION INTRAVENOUS at 17:40

## 2018-01-01 RX ADMIN — CARVEDILOL 12.5 MG: 12.5 TABLET, FILM COATED ORAL at 09:12

## 2018-01-01 RX ADMIN — ATORVASTATIN CALCIUM 40 MG: 40 TABLET, FILM COATED ORAL at 09:12

## 2018-01-01 RX ADMIN — HEPARIN SODIUM 5000 UNITS: 5000 INJECTION, SOLUTION INTRAVENOUS; SUBCUTANEOUS at 11:22

## 2018-01-01 RX ADMIN — NAFCILLIN SODIUM 2 G: 2 INJECTION, POWDER, LYOPHILIZED, FOR SOLUTION INTRAMUSCULAR; INTRAVENOUS at 11:55

## 2018-01-01 RX ADMIN — CARVEDILOL 12.5 MG: 12.5 TABLET, FILM COATED ORAL at 20:51

## 2018-01-01 RX ADMIN — ONDANSETRON 4 MG: 2 INJECTION INTRAMUSCULAR; INTRAVENOUS at 18:05

## 2018-01-01 RX ADMIN — ASPIRIN 81 MG CHEWABLE TABLET 81 MG: 81 TABLET CHEWABLE at 08:37

## 2018-01-01 RX ADMIN — HYDRALAZINE HYDROCHLORIDE 10 MG: 20 INJECTION INTRAMUSCULAR; INTRAVENOUS at 02:48

## 2018-01-01 RX ADMIN — ONDANSETRON 4 MG: 2 INJECTION INTRAMUSCULAR; INTRAVENOUS at 23:30

## 2018-01-01 RX ADMIN — NAFCILLIN SODIUM 2 G: 2 INJECTION, POWDER, LYOPHILIZED, FOR SOLUTION INTRAMUSCULAR; INTRAVENOUS at 15:42

## 2018-01-01 RX ADMIN — ONDANSETRON 4 MG: 2 INJECTION INTRAMUSCULAR; INTRAVENOUS at 16:56

## 2018-01-01 RX ADMIN — ATORVASTATIN CALCIUM 40 MG: 40 TABLET, FILM COATED ORAL at 08:36

## 2018-01-01 RX ADMIN — SODIUM CHLORIDE 1000 ML: 9 INJECTION, SOLUTION INTRAVENOUS at 10:44

## 2018-01-01 RX ADMIN — CARVEDILOL 12.5 MG: 12.5 TABLET, FILM COATED ORAL at 23:24

## 2018-01-01 RX ADMIN — HUMAN ALBUMIN MICROSPHERES AND PERFLUTREN 6 ML: 10; .22 INJECTION, SOLUTION INTRAVENOUS at 14:45

## 2018-01-01 RX ADMIN — AMLODIPINE BESYLATE 10 MG: 10 TABLET ORAL at 09:32

## 2018-01-01 RX ADMIN — ONDANSETRON 4 MG: 2 INJECTION INTRAMUSCULAR; INTRAVENOUS at 19:21

## 2018-01-01 RX ADMIN — SODIUM CHLORIDE 1000 ML: 9 INJECTION, SOLUTION INTRAVENOUS at 15:07

## 2018-01-01 RX ADMIN — LOSARTAN POTASSIUM 50 MG: 50 TABLET ORAL at 09:12

## 2018-01-01 RX ADMIN — CEFAZOLIN SODIUM 2 G: 2 INJECTION, SOLUTION INTRAVENOUS at 11:06

## 2018-01-01 RX ADMIN — NAFCILLIN SODIUM 2 G: 2 INJECTION, POWDER, LYOPHILIZED, FOR SOLUTION INTRAMUSCULAR; INTRAVENOUS at 05:22

## 2018-01-01 RX ADMIN — FLUOXETINE 20 MG: 20 CAPSULE ORAL at 09:32

## 2018-01-01 RX ADMIN — MIRTAZAPINE 30 MG: 15 TABLET, FILM COATED ORAL at 21:33

## 2018-01-01 RX ADMIN — HEPARIN SODIUM 5000 UNITS: 5000 INJECTION, SOLUTION INTRAVENOUS; SUBCUTANEOUS at 05:22

## 2018-01-01 RX ADMIN — FLUOXETINE 20 MG: 20 CAPSULE ORAL at 09:19

## 2018-01-01 RX ADMIN — LOSARTAN POTASSIUM 50 MG: 50 TABLET ORAL at 21:38

## 2018-01-01 RX ADMIN — MINERAL OIL AND WHITE PETROLATUM: 150; 830 OINTMENT OPHTHALMIC at 21:26

## 2018-01-01 RX ADMIN — OLANZAPINE 5 MG: 5 TABLET, FILM COATED ORAL at 08:36

## 2018-01-01 RX ADMIN — FAMOTIDINE 20 MG: 20 TABLET ORAL at 21:33

## 2018-01-01 RX ADMIN — CARVEDILOL 12.5 MG: 12.5 TABLET, FILM COATED ORAL at 05:08

## 2018-01-01 RX ADMIN — LOSARTAN POTASSIUM 50 MG: 50 TABLET ORAL at 22:40

## 2018-01-01 RX ADMIN — SODIUM CHLORIDE: 9 INJECTION, SOLUTION INTRAVENOUS at 06:09

## 2018-01-01 RX ADMIN — CALCIUM CARBONATE (ANTACID) CHEW TAB 500 MG 500 MG: 500 CHEW TAB at 10:48

## 2018-01-01 RX ADMIN — MULTIVITAMIN 15 ML: LIQUID ORAL at 08:35

## 2018-01-01 RX ADMIN — HEPARIN SODIUM 5000 UNITS: 5000 INJECTION, SOLUTION INTRAVENOUS; SUBCUTANEOUS at 21:32

## 2018-01-01 RX ADMIN — FLUOXETINE 20 MG: 20 CAPSULE ORAL at 08:50

## 2018-01-01 RX ADMIN — ONDANSETRON 4 MG: 2 INJECTION INTRAMUSCULAR; INTRAVENOUS at 16:14

## 2018-01-01 RX ADMIN — ASPIRIN 81 MG 81 MG: 81 TABLET ORAL at 08:50

## 2018-01-01 RX ADMIN — ACETAMINOPHEN 325 MG: 325 TABLET, FILM COATED ORAL at 11:55

## 2018-01-01 RX ADMIN — FAMOTIDINE 20 MG: 20 TABLET ORAL at 09:17

## 2018-01-01 RX ADMIN — ATORVASTATIN CALCIUM 40 MG: 40 TABLET, FILM COATED ORAL at 09:17

## 2018-01-01 RX ADMIN — FAMOTIDINE 20 MG: 20 TABLET ORAL at 08:50

## 2018-01-01 RX ADMIN — FAMOTIDINE 20 MG: 20 TABLET ORAL at 09:12

## 2018-01-01 RX ADMIN — ASPIRIN 81 MG 81 MG: 81 TABLET ORAL at 09:32

## 2018-01-01 RX ADMIN — LABETALOL HYDROCHLORIDE 20 MG: 5 INJECTION, SOLUTION INTRAVENOUS at 20:47

## 2018-01-01 RX ADMIN — FLUCONAZOLE 300 MG: 40 POWDER, FOR SUSPENSION ORAL at 08:36

## 2018-01-01 RX ADMIN — VANCOMYCIN HYDROCHLORIDE 2000 MG: 1 INJECTION, POWDER, LYOPHILIZED, FOR SOLUTION INTRAVENOUS at 09:51

## 2018-01-01 RX ADMIN — BUMETANIDE 1 MG: 1 TABLET ORAL at 08:36

## 2018-01-01 RX ADMIN — CARVEDILOL 3.12 MG: 3.12 TABLET, FILM COATED ORAL at 08:37

## 2018-01-01 RX ADMIN — CEFEPIME 2 G: 1 INJECTION, SOLUTION INTRAVENOUS at 12:35

## 2018-01-01 RX ADMIN — BUMETANIDE 1 MG: 1 TABLET ORAL at 15:35

## 2018-01-01 RX ADMIN — ONDANSETRON 4 MG: 2 INJECTION INTRAMUSCULAR; INTRAVENOUS at 10:52

## 2018-01-01 RX ADMIN — AMPICILLIN SODIUM 2 G: 1 INJECTION, POWDER, FOR SOLUTION INTRAMUSCULAR; INTRAVENOUS at 12:55

## 2018-01-01 RX ADMIN — SODIUM CHLORIDE 70 ML: 9 INJECTION, SOLUTION INTRAVENOUS at 20:16

## 2018-01-01 RX ADMIN — CARVEDILOL 12.5 MG: 12.5 TABLET, FILM COATED ORAL at 08:50

## 2018-01-01 RX ADMIN — HUMAN ALBUMIN MICROSPHERES AND PERFLUTREN 6 ML: 10; .22 INJECTION, SOLUTION INTRAVENOUS at 09:00

## 2018-01-01 RX ADMIN — WATER 2000 MG: 1 INJECTION INTRAMUSCULAR; INTRAVENOUS; SUBCUTANEOUS at 10:13

## 2018-01-01 RX ADMIN — NAFCILLIN SODIUM 2 G: 2 INJECTION, POWDER, LYOPHILIZED, FOR SOLUTION INTRAMUSCULAR; INTRAVENOUS at 01:47

## 2018-01-01 RX ADMIN — CARVEDILOL 12.5 MG: 12.5 TABLET, FILM COATED ORAL at 21:39

## 2018-01-01 RX ADMIN — ASPIRIN 81 MG 81 MG: 81 TABLET ORAL at 09:13

## 2018-01-01 RX ADMIN — CARVEDILOL 3.12 MG: 3.12 TABLET, FILM COATED ORAL at 11:34

## 2018-01-01 RX ADMIN — ATORVASTATIN CALCIUM 40 MG: 40 TABLET, FILM COATED ORAL at 09:23

## 2018-01-01 RX ADMIN — GADOBUTROL 8 ML: 604.72 INJECTION INTRAVENOUS at 07:56

## 2018-01-01 RX ADMIN — Medication 1 PACKET: at 08:37

## 2018-01-01 RX ADMIN — LOSARTAN POTASSIUM 50 MG: 50 TABLET ORAL at 09:32

## 2018-01-01 RX ADMIN — CEFAZOLIN SODIUM 2 G: 2 INJECTION, SOLUTION INTRAVENOUS at 05:51

## 2018-01-01 RX ADMIN — CARVEDILOL 12.5 MG: 12.5 TABLET, FILM COATED ORAL at 22:40

## 2018-01-01 RX ADMIN — NAFCILLIN SODIUM 2 G: 2 INJECTION, POWDER, LYOPHILIZED, FOR SOLUTION INTRAMUSCULAR; INTRAVENOUS at 08:27

## 2018-01-01 RX ADMIN — ASPIRIN 81 MG CHEWABLE TABLET 81 MG: 81 TABLET CHEWABLE at 09:23

## 2018-01-01 RX ADMIN — Medication 1 PACKET: at 15:36

## 2018-01-01 RX ADMIN — POLYETHYLENE GLYCOL 3350 17 G: 17 POWDER, FOR SOLUTION ORAL at 09:16

## 2018-01-01 RX ADMIN — AMLODIPINE BESYLATE 5 MG: 5 TABLET ORAL at 08:50

## 2018-01-01 RX ADMIN — KETOTIFEN FUMARATE 1 DROP: 0.25 SOLUTION/ DROPS OPHTHALMIC at 08:41

## 2018-01-01 RX ADMIN — LABETALOL HYDROCHLORIDE 20 MG: 5 INJECTION, SOLUTION INTRAVENOUS at 16:57

## 2018-01-01 RX ADMIN — SODIUM CHLORIDE: 9 INJECTION, SOLUTION INTRAVENOUS at 15:11

## 2018-01-01 RX ADMIN — SODIUM CHLORIDE 1000 ML: 9 INJECTION, SOLUTION INTRAVENOUS at 16:42

## 2018-01-01 RX ADMIN — Medication 300 MG: at 08:36

## 2018-01-01 ASSESSMENT — ACTIVITIES OF DAILY LIVING (ADL)
ADLS_ACUITY_SCORE: 27
ADLS_ACUITY_SCORE: 27
ADLS_ACUITY_SCORE: 23
ADLS_ACUITY_SCORE: 27
ADLS_ACUITY_SCORE: 27
ADLS_ACUITY_SCORE: 23
BATHING: 2-->ASSISTIVE PERSON
ADLS_ACUITY_SCORE: 27
ADLS_ACUITY_SCORE: 27
RETIRED_COMMUNICATION: 0-->UNDERSTANDS/COMMUNICATES WITHOUT DIFFICULTY
ADLS_ACUITY_SCORE: 23
ADLS_ACUITY_SCORE: 27
ADLS_ACUITY_SCORE: 27
FALL_HISTORY_WITHIN_LAST_SIX_MONTHS: NO
ADLS_ACUITY_SCORE: 27
AMBULATION: 1-->ASSISTIVE EQUIPMENT
ADLS_ACUITY_SCORE: 27
ADLS_ACUITY_SCORE: 27
RETIRED_EATING: 2-->ASSISTIVE PERSON
TOILETING: 2-->ASSISTIVE PERSON
DRESS: 2-->ASSISTIVE PERSON
COGNITION: 0 - NO COGNITION ISSUES REPORTED
ADLS_ACUITY_SCORE: 27
SWALLOWING: 0-->SWALLOWS FOODS/LIQUIDS WITHOUT DIFFICULTY
ADLS_ACUITY_SCORE: 27
TRANSFERRING: 1-->ASSISTIVE EQUIPMENT
ADLS_ACUITY_SCORE: 27

## 2018-01-01 ASSESSMENT — ENCOUNTER SYMPTOMS
LOSS OF CONSCIOUSNESS: 0
EYE IRRITATION: 1
TREMORS: 0
STIFFNESS: 1
EYE REDNESS: 1
SORE THROAT: 0
BLOOD IN STOOL: 0
MEMORY LOSS: 1
HEADACHES: 1
WEAKNESS: 1
BLOATING: 0
PANIC: 1
VOMITING: 0
INCREASED ENERGY: 1
SHORTNESS OF BREATH: 0
RECTAL PAIN: 0
NERVOUS/ANXIOUS: 1
SINUS CONGESTION: 0
PALPITATIONS: 0
FEVER: 0
NUMBNESS: 1
DIARRHEA: 1
NECK MASS: 0
DIARRHEA: 0
SHORTNESS OF BREATH: 0
ARTHRALGIAS: 1
BACK PAIN: 1
DECREASED APPETITE: 1
ABDOMINAL PAIN: 1
POLYDIPSIA: 0
SPEECH CHANGE: 1
JAUNDICE: 0
MYALGIAS: 1
DIARRHEA: 0
EYE WATERING: 0
FATIGUE: 1
TINGLING: 1
MUSCLE WEAKNESS: 1
HALLUCINATIONS: 0
NECK PAIN: 1
VOMITING: 1
FEVER: 0
DIZZINESS: 1
DYSURIA: 0
CONFUSION: 1
POOR WOUND HEALING: 0
NAIL CHANGES: 0
TASTE DISTURBANCE: 1
CONSTIPATION: 1
ABDOMINAL PAIN: 0
NIGHT SWEATS: 0
FEVER: 0
ACTIVITY CHANGE: 1
TROUBLE SWALLOWING: 0
DIFFICULTY URINATING: 0
EYE PAIN: 0
NECK STIFFNESS: 0
ABDOMINAL PAIN: 0
DECREASED CONCENTRATION: 1
NAUSEA: 0
BLOOD IN STOOL: 0
POLYPHAGIA: 0
HOARSE VOICE: 0
SINUS PAIN: 0
DEPRESSION: 1
HEARTBURN: 0
BOWEL INCONTINENCE: 1
WOUND: 1
NAUSEA: 1
MUSCLE CRAMPS: 1
ALTERED TEMPERATURE REGULATION: 1
ARTHRALGIAS: 1
WEAKNESS: 1
PARALYSIS: 0
SEIZURES: 0
WEIGHT LOSS: 1
CHILLS: 0
SKIN CHANGES: 0
DIFFICULTY URINATING: 0
WEIGHT GAIN: 0
SMELL DISTURBANCE: 1
HEADACHES: 0
INSOMNIA: 0
COUGH: 1
FREQUENCY: 0
DOUBLE VISION: 0
DISTURBANCES IN COORDINATION: 1
ANAL BLEEDING: 0
CHILLS: 0
JOINT SWELLING: 0

## 2018-01-01 ASSESSMENT — ANXIETY QUESTIONNAIRES
3. WORRYING TOO MUCH ABOUT DIFFERENT THINGS: NOT AT ALL
GAD7 TOTAL SCORE: 5
1. FEELING NERVOUS, ANXIOUS, OR ON EDGE: NOT AT ALL
7. FEELING AFRAID AS IF SOMETHING AWFUL MIGHT HAPPEN: NOT AT ALL
1. FEELING NERVOUS, ANXIOUS, OR ON EDGE: SEVERAL DAYS
IF YOU CHECKED OFF ANY PROBLEMS ON THIS QUESTIONNAIRE, HOW DIFFICULT HAVE THESE PROBLEMS MADE IT FOR YOU TO DO YOUR WORK, TAKE CARE OF THINGS AT HOME, OR GET ALONG WITH OTHER PEOPLE: SOMEWHAT DIFFICULT
GAD7 TOTAL SCORE: 3
7. FEELING AFRAID AS IF SOMETHING AWFUL MIGHT HAPPEN: NOT AT ALL
6. BECOMING EASILY ANNOYED OR IRRITABLE: MORE THAN HALF THE DAYS
2. NOT BEING ABLE TO STOP OR CONTROL WORRYING: SEVERAL DAYS
3. WORRYING TOO MUCH ABOUT DIFFERENT THINGS: NOT AT ALL
2. NOT BEING ABLE TO STOP OR CONTROL WORRYING: NOT AT ALL
GAD7 TOTAL SCORE: 5
5. BEING SO RESTLESS THAT IT IS HARD TO SIT STILL: NOT AT ALL
GAD7 TOTAL SCORE: 3
IF YOU CHECKED OFF ANY PROBLEMS ON THIS QUESTIONNAIRE, HOW DIFFICULT HAVE THESE PROBLEMS MADE IT FOR YOU TO DO YOUR WORK, TAKE CARE OF THINGS AT HOME, OR GET ALONG WITH OTHER PEOPLE: SOMEWHAT DIFFICULT
5. BEING SO RESTLESS THAT IT IS HARD TO SIT STILL: NOT AT ALL
6. BECOMING EASILY ANNOYED OR IRRITABLE: MORE THAN HALF THE DAYS

## 2018-01-01 ASSESSMENT — MIFFLIN-ST. JEOR
SCORE: 1602.5
SCORE: 1606.5
SCORE: 1612.5
SCORE: 1573.5
SCORE: 1580.51

## 2018-01-01 ASSESSMENT — PAIN SCALES - GENERAL
PAINLEVEL: NO PAIN (0)

## 2018-01-01 ASSESSMENT — PATIENT HEALTH QUESTIONNAIRE - PHQ9
SUM OF ALL RESPONSES TO PHQ QUESTIONS 1-9: 8
5. POOR APPETITE OR OVEREATING: MORE THAN HALF THE DAYS
SUM OF ALL RESPONSES TO PHQ QUESTIONS 1-9: 5
5. POOR APPETITE OR OVEREATING: NOT AT ALL

## 2018-01-01 NOTE — PROGRESS NOTES
CVTS Daily Note  1/1/2018  Attending: Pili Bowers,*    S:   No overnight events. Transient flattened affect and not following commands.   Pt seen at bedside resting comfortably.    No acute complaints.      Denies F/C/Sweats.  No CP, SOB, or calf pain.    Tolerating diet and tube feeds.  + BM.  + Flatus.    R sided hemiparesis, L sided weakness, stable overall.   Pain controlled well.    O:   Vitals:    12/31/17 2003 01/01/18 0200 01/01/18 0526 01/01/18 0833   BP: (!) 136/94 109/73 131/90 109/87   BP Location: Left arm      Pulse:       Resp: 18 18 18 18   Temp: 97.9  F (36.6  C) 97.9  F (36.6  C) 97.8  F (36.6  C) 98.2  F (36.8  C)   TempSrc: Oral Axillary Axillary    SpO2: 95% 98% 100% 96%   Weight:         Vitals:    12/29/17 0600 12/30/17 0400 12/31/17 0500   Weight: 74.5 kg (164 lb 3.9 oz) 74 kg (163 lb 2.3 oz) 73.9 kg (163 lb)     - 9 kg since admit      Intake/Output Summary (Last 24 hours) at 01/01/18 0859  Last data filed at 01/01/18 0814   Gross per 24 hour   Intake             2636 ml   Output             1640 ml   Net              996 ml       MAPs: 81 - 118  Gen: AAO x 3, pleasant, NAD, L facial droop  Neuro: L facial droop, R sided gross motor movement today, L sided purposeful movements but not following exact commands   CV: RRR, S1S2 normal, no murmurs, rubs, or gallops.   Pulm: CTA, no rhonchi or wheezes  Abd: soft, non-tender, no guarding  Ext: no peripheral edema  Incision: clean, dry, intact, no erythema  Chest Tube sites: dressings clean and dry    Labs:  [facility]    Recent Labs  Lab 01/01/18  0819 12/31/17  0705 12/30/17  0703 12/29/17  0732   * 141 142 139   POTASSIUM 4.0 3.5 3.7 3.6   CHLORIDE 110* 107 108 105   IZABELLA 9.4 9.3 9.3 9.4   CO2 25 25 24 26   BUN 27 28 34* 33*   CR 0.84 0.86 0.93 0.83   * 108* 121* 118*     CBC    Recent Labs  Lab 01/01/18  0819 12/31/17  0705 12/30/17  0703 12/29/17  0732   WBC 12.7* 11.9* 12.5* 13.6*   RBC 3.01* 2.85* 2.97* 3.08*   HGB 9.4* 8.8*  9.1* 9.4*   HCT 31.0* 29.6* 30.3* 31.3*   * 104* 102* 102*   MCH 31.2 30.9 30.6 30.5   MCHC 30.3* 29.7* 30.0* 30.0*   RDW 19.0* 18.7* 18.6* 18.5*   * 458* 492* 526*     Hepatic Panel   Lab Results   Component Value Date    AST 32 12/18/2017     Lab Results   Component Value Date    ALT 30 12/18/2017     Lab Results   Component Value Date    ALBUMIN 1.5 12/18/2017     GLUCOSE:     Recent Labs  Lab 12/31/17  0705 12/30/17  0703 12/29/17  2150 12/29/17  1703 12/29/17  1429 12/29/17  0732 12/28/17  2115 12/28/17  1914 12/28/17  1109 12/28/17  0727  12/27/17  0430  12/26/17  0623   * 121*  --   --   --  118*  --   --   --  116*  --  159*  --  128*   BGM  --   --  120* 167* 127*  --  148* 163* 135*  --   < >  --   < >  --    < > = values in this interval not displayed.    Imaging:    CXR 12/31-  1. Stable postsurgical changes of cardiac surgery with stable enlargement of the cardiac silhouette.  2. No acute airspace disease.    ASSESSMENT: Cricket Miguel is a 62 year old male with hx of COPD, HTN, HLD, CAD, heart failure, aortic stenosis and ascending aortic aneurysm s/p aortic valve replacement and aortic root replacement (April 2016) c/b moises-aortic abscess, endocarditis, cerebral septic emboli and severe AI s/p redo-sternotomy aortic root and valve homograft 12/19. Post op EF 20-25%.        PLAN:   Neuro/ pain/ sedation: Hx of multifocal acute infarctions involving the left parietotemporal lobes, left cerebral hemisphere and right occipital lobe presumed to be septic emboli. Depression, mild delirium, followed by psych.  -Monitor neurological status. Notify the MD for any acute changes in exam.  -Dilaudid PRN.  -Mental status improving slowly   - On psych rec started olanzapine. Agitation improving. Serial EKGs to evaluate QT interval. QTc today 479. Psych recommended decreasing zyprexa given increased flat affect and delayed responses, however much more anxious 12/27 so dose kept at 5 mg BID.  Doing better today with both anxiety and affect. Reduced to 2.5 mg BID 1/1.   - Neuro recs fluoxetine for post-stroke recovery, need to call Psych on Tues 1/2 to review     Pulmonary care: extubated 12/20  - On facemask 2 LPM saturation 100%.  - Encourage pulmonary toilet  - Nasotracheal suctioning  - Removed right chest tube 12/26   - Left pleural tube clamping trial okay, removed 12/27  - Vaseline to lips to avoid drying out.       Cardiovascular:    -Hx HTN, HLD, CAD, AS and aortic aneurysm s/p bentall c/b endocarditis and severe AI s/p aortic root/valve homograft 12/19.  -Keep SBP<120  - Cardiology recently started losartan 25 mg po bid. Pressure a little soft and patient reported being a little lightheaded. Gave 100 ml fluid back with improved BP. May need to reduce Losartan dosing if not tolerating but patient appeared to be dry this AM.    - Coreg 6.25 mg bid since 1/1  - ASA 81 mg and atorvastatin.  - Echo shows EF 20-25% baseline 30%   - No bradycardia or pauses, had been in NSR, TPW removed 12/26.        GI care:   - PEG  - famotidine  - senna/colace/miralax, +BM, cdiff negative 12/26       Fluids/ Electrolytes/ Nutrition:   -PRN electrolyte replacement  -Bolus tube feeds through PEG, dajuan counts  -HyperNA 1/1, give extra 100 mL via G tube twice today.      Renal/ Fluid Balance:   - Espinoza catheter for strict intake and output.  - Monitor BMP, creatinine  - IV lasix transitioned to Bumex 2 mg PO BID on 12/26, evening dose held on 12/29 due to hypotension and lightheadedness. Stable Bumex 1 mg po bid since 12/30.        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 Hx of MSSA septic arthritis and bacteremia. Hx of multifocal acute infarctions involving the left parietotemporal lobes, left cerebral hemisphere and right occipital lobe presumed to be septic emboli. ID following.   -New cultures BAL growing pseudomonas aeruginosa. ID following.  Continue nafcillin/cefepime/rifampin per ID. WBC 12.7.    - Fluconazole for oral thrush (7 days) 12/30      ID rec's from 12/26  - restart PTA nafcillin 2g IV q4h, previously planned duration until 1/7/18  - continue rifampin 300mg PO BID, previously planned duration until 1/7/18  -continue cefepime for 2 total weeks duration for pseudomonas pneumonia (end date 1/3/18)  -F/u surgical tissue cultures       Heme:   - Acute blood loss anemia  - Hgb stable.        Prophylaxis:    -Mechanical prophylaxis for DVT.   -Heparin TID.       Lines/ tubes/ drains:  -R triple lumen PICC, garza, PIVs       Disposition:  -Floor since 12/23   -Recommending discharge to TCU when bed available, early this week.       Discussed with CVTS Fellow   Staff surgeons have been informed of changes through both  verbal and written communication.      Deny Trimble PA-C  Cardiothoracic Surgery  Pager [phone]    8:59 AM   January 1, 2018

## 2018-01-01 NOTE — PROGRESS NOTES
CHRISTA: Pt with h/y of COPD, HTN,HLD, CAD, HF,aortic stenosis, AA aneurysm,aortic valve replacement,aortic root replacement (april,2016) complicated by moises  aortic abscess, endocarditis, redo sternotomy aortic root and valve homograft 12/19 , CVA per note.   VSS, SR with HR at , BP stable,  no neuro changes. Condom catheter in place with AUO, repositioned e/y 2 hours,  meds through G -tube. Coccyx  red,blanchable,barrier cream applied.  BM X 1 loose.   Plan: Continue to monitor,notify MD as needed. Possible will be dc toTCU this week.

## 2018-01-01 NOTE — PLAN OF CARE
Problem: Patient Care Overview  Goal: Plan of Care/Patient Progress Review  Outcome: No Change  D: s/p redo sternotomy for aortic root and valve 12/19    I: Monitored vitals and assessed pt status.   Running: TKO for IV abx  PRN: ativan    A: A&O to self/place- difficult to determine orientation d/t slurred speech. VSS. Afebrile. NSR. Denies pain. Condom catheter replaced this evening- good urine output. Loose/watery BM x2. Lift dependent- up to chair x1. R sided hemiparesis, L sided facial droop baseline. R groin incision, Midline chest incision RAMA. G tube clamped- BID bolus feedings per POC. Ate portion of dinner this evening- total feed, watch for pocketing of food. Pureed thin liquid diet. Int abx. Blanchable redness on coccyx- barrier cream. Pt pleasant, cooperative. Bout of anxiety this evening- resolved when pts significant other came to bedside. Ativan given at bedtime.     P: Rehab when bed available. Continue to monitor Pt status and report changes to treatment team.

## 2018-01-01 NOTE — PROGRESS NOTES
CHRISTA: Pt with h/y of COPD, HTN,HLD, CAD, HF,aortic stenosis, AA aneurysm,aortic valve replacement,aortic root replacement (april,2016) complicated by moises  aortic abscess, endocarditis, redo sternotomy aortic root and valve homograft 12/19 , CVA per note.   VSS, SR with HR at , BP stable,  no neuro changes. Condom catheter in place with AUO, repositioned e/y 2 hours,  meds through G -tube. Coccyx  red,blanchable,barrier cream applied.  BM X 1 loose.   Plan: Continue to monitor,notify MD as needed. Possible will be dc toTCU next week.

## 2018-01-01 NOTE — PLAN OF CARE
Problem: Patient Care Overview  Goal: Plan of Care/Patient Progress Review  Discharge Planner SLP   Patient plan for discharge: none stated  Current status: Pt remains appropriate for dysphagia diet level 1 with thin liquids with supervision/assist. Pt to sit upright for all PO, take small sips/bites, and alternate consistencies. Place bite/sip on Pts right side and frequently check oral cavity for residue. ST to follow.  Barriers to return to prior living situation: cognition, dysphagia,   Recommendations for discharge: TCU, ongoing SLP services  Rationale for recommendations: dysphagia, continued education        Entered by: Kinjal Dyson 01/01/2018 3:48 PM

## 2018-01-02 ENCOUNTER — APPOINTMENT (OUTPATIENT)
Dept: OCCUPATIONAL THERAPY | Facility: CLINIC | Age: 63
DRG: 219 | End: 2018-01-02
Attending: INTERNAL MEDICINE
Payer: COMMERCIAL

## 2018-01-02 ENCOUNTER — HOME INFUSION (PRE-WILLOW HOME INFUSION) (OUTPATIENT)
Dept: PHARMACY | Facility: CLINIC | Age: 63
End: 2018-01-02

## 2018-01-02 LAB
ANION GAP SERPL CALCULATED.3IONS-SCNC: 7 MMOL/L (ref 3–14)
BUN SERPL-MCNC: 27 MG/DL (ref 7–30)
CALCIUM SERPL-MCNC: 9 MG/DL (ref 8.5–10.1)
CHLORIDE SERPL-SCNC: 109 MMOL/L (ref 94–109)
CO2 SERPL-SCNC: 26 MMOL/L (ref 20–32)
CREAT SERPL-MCNC: 0.94 MG/DL (ref 0.66–1.25)
ERYTHROCYTE [DISTWIDTH] IN BLOOD BY AUTOMATED COUNT: 19.1 % (ref 10–15)
GFR SERPL CREATININE-BSD FRML MDRD: 81 ML/MIN/1.7M2
GLUCOSE SERPL-MCNC: 113 MG/DL (ref 70–99)
HCT VFR BLD AUTO: 31.4 % (ref 40–53)
HGB BLD-MCNC: 9.3 G/DL (ref 13.3–17.7)
MAGNESIUM SERPL-MCNC: 2 MG/DL (ref 1.6–2.3)
MCH RBC QN AUTO: 30.5 PG (ref 26.5–33)
MCHC RBC AUTO-ENTMCNC: 29.6 G/DL (ref 31.5–36.5)
MCV RBC AUTO: 103 FL (ref 78–100)
PLATELET # BLD AUTO: 497 10E9/L (ref 150–450)
POTASSIUM SERPL-SCNC: 4.1 MMOL/L (ref 3.4–5.3)
RBC # BLD AUTO: 3.05 10E12/L (ref 4.4–5.9)
SODIUM SERPL-SCNC: 141 MMOL/L (ref 133–144)
WBC # BLD AUTO: 11.5 10E9/L (ref 4–11)

## 2018-01-02 PROCEDURE — 25000132 ZZH RX MED GY IP 250 OP 250 PS 637: Performed by: SURGERY

## 2018-01-02 PROCEDURE — 97535 SELF CARE MNGMENT TRAINING: CPT | Mod: GO

## 2018-01-02 PROCEDURE — 25000125 ZZHC RX 250: Performed by: PHYSICIAN ASSISTANT

## 2018-01-02 PROCEDURE — 97530 THERAPEUTIC ACTIVITIES: CPT | Mod: GO

## 2018-01-02 PROCEDURE — 25000132 ZZH RX MED GY IP 250 OP 250 PS 637: Performed by: HOSPITALIST

## 2018-01-02 PROCEDURE — 25000125 ZZHC RX 250: Performed by: THORACIC SURGERY (CARDIOTHORACIC VASCULAR SURGERY)

## 2018-01-02 PROCEDURE — 25000132 ZZH RX MED GY IP 250 OP 250 PS 637: Performed by: PHYSICIAN ASSISTANT

## 2018-01-02 PROCEDURE — 40000802 ZZH SITE CHECK

## 2018-01-02 PROCEDURE — 25000128 H RX IP 250 OP 636: Performed by: PHYSICIAN ASSISTANT

## 2018-01-02 PROCEDURE — 25000132 ZZH RX MED GY IP 250 OP 250 PS 637: Performed by: ANESTHESIOLOGY

## 2018-01-02 PROCEDURE — 85027 COMPLETE CBC AUTOMATED: CPT | Performed by: SURGERY

## 2018-01-02 PROCEDURE — 21400006 ZZH R&B CCU INTERMEDIATE UMMC

## 2018-01-02 PROCEDURE — 80048 BASIC METABOLIC PNL TOTAL CA: CPT | Performed by: SURGERY

## 2018-01-02 PROCEDURE — 40000133 ZZH STATISTIC OT WARD VISIT

## 2018-01-02 PROCEDURE — 36592 COLLECT BLOOD FROM PICC: CPT | Performed by: SURGERY

## 2018-01-02 PROCEDURE — 25000132 ZZH RX MED GY IP 250 OP 250 PS 637: Performed by: INTERNAL MEDICINE

## 2018-01-02 PROCEDURE — 83735 ASSAY OF MAGNESIUM: CPT | Performed by: SURGERY

## 2018-01-02 RX ORDER — NAFCILLIN SODIUM 2 G/8ML
2 INJECTION, POWDER, FOR SOLUTION INTRAMUSCULAR; INTRAVENOUS EVERY 4 HOURS
Qty: 240 ML | Refills: 0 | Status: SHIPPED | OUTPATIENT
Start: 2018-01-02 | End: 2018-01-04

## 2018-01-02 RX ADMIN — NAFCILLIN SODIUM 2 G: 2 INJECTION, POWDER, LYOPHILIZED, FOR SOLUTION INTRAMUSCULAR; INTRAVENOUS at 13:25

## 2018-01-02 RX ADMIN — Medication 1 PACKET: at 13:28

## 2018-01-02 RX ADMIN — NAFCILLIN SODIUM 2 G: 2 INJECTION, POWDER, LYOPHILIZED, FOR SOLUTION INTRAMUSCULAR; INTRAVENOUS at 23:58

## 2018-01-02 RX ADMIN — BUMETANIDE 1 MG: 1 TABLET ORAL at 16:14

## 2018-01-02 RX ADMIN — LOSARTAN POTASSIUM 25 MG: 25 TABLET ORAL at 20:12

## 2018-01-02 RX ADMIN — OLANZAPINE 2.5 MG: 2.5 TABLET, FILM COATED ORAL at 08:21

## 2018-01-02 RX ADMIN — FAMOTIDINE 20 MG: 20 TABLET ORAL at 08:20

## 2018-01-02 RX ADMIN — Medication 1 PACKET: at 08:21

## 2018-01-02 RX ADMIN — Medication 300 MG: at 20:12

## 2018-01-02 RX ADMIN — ASPIRIN 81 MG CHEWABLE TABLET 81 MG: 81 TABLET CHEWABLE at 08:20

## 2018-01-02 RX ADMIN — NAFCILLIN SODIUM 2 G: 2 INJECTION, POWDER, LYOPHILIZED, FOR SOLUTION INTRAMUSCULAR; INTRAVENOUS at 08:19

## 2018-01-02 RX ADMIN — CARVEDILOL 6.25 MG: 6.25 TABLET, FILM COATED ORAL at 19:33

## 2018-01-02 RX ADMIN — CEFEPIME 2 G: 1 INJECTION, SOLUTION INTRAVENOUS at 04:22

## 2018-01-02 RX ADMIN — KETOTIFEN FUMARATE 1 DROP: 0.25 SOLUTION/ DROPS OPHTHALMIC at 13:31

## 2018-01-02 RX ADMIN — Medication 2 G: at 11:00

## 2018-01-02 RX ADMIN — MULTIVITAMIN 15 ML: LIQUID ORAL at 08:21

## 2018-01-02 RX ADMIN — FLUCONAZOLE 300 MG: 40 POWDER, FOR SUSPENSION ORAL at 08:21

## 2018-01-02 RX ADMIN — BUMETANIDE 1 MG: 1 TABLET ORAL at 08:20

## 2018-01-02 RX ADMIN — ATORVASTATIN CALCIUM 40 MG: 40 TABLET, FILM COATED ORAL at 08:20

## 2018-01-02 RX ADMIN — LOSARTAN POTASSIUM 25 MG: 25 TABLET ORAL at 08:20

## 2018-01-02 RX ADMIN — HEPARIN SODIUM 5000 UNITS: 5000 INJECTION, SOLUTION INTRAVENOUS; SUBCUTANEOUS at 20:21

## 2018-01-02 RX ADMIN — Medication: at 20:13

## 2018-01-02 RX ADMIN — CEFEPIME 2 G: 1 INJECTION, SOLUTION INTRAVENOUS at 11:44

## 2018-01-02 RX ADMIN — NAFCILLIN SODIUM 2 G: 2 INJECTION, POWDER, LYOPHILIZED, FOR SOLUTION INTRAMUSCULAR; INTRAVENOUS at 04:30

## 2018-01-02 RX ADMIN — NAFCILLIN SODIUM 2 G: 2 INJECTION, POWDER, LYOPHILIZED, FOR SOLUTION INTRAMUSCULAR; INTRAVENOUS at 20:10

## 2018-01-02 RX ADMIN — HEPARIN SODIUM 5000 UNITS: 5000 INJECTION, SOLUTION INTRAVENOUS; SUBCUTANEOUS at 13:26

## 2018-01-02 RX ADMIN — Medication: at 08:22

## 2018-01-02 RX ADMIN — POTASSIUM CHLORIDE 20 MEQ: 20 SOLUTION ORAL at 20:12

## 2018-01-02 RX ADMIN — Medication 1 PACKET: at 20:13

## 2018-01-02 RX ADMIN — NAFCILLIN SODIUM 2 G: 2 INJECTION, POWDER, LYOPHILIZED, FOR SOLUTION INTRAMUSCULAR; INTRAVENOUS at 01:09

## 2018-01-02 RX ADMIN — CEFEPIME 2 G: 1 INJECTION, SOLUTION INTRAVENOUS at 20:08

## 2018-01-02 RX ADMIN — FAMOTIDINE 20 MG: 20 TABLET ORAL at 20:12

## 2018-01-02 RX ADMIN — Medication: at 13:27

## 2018-01-02 RX ADMIN — MIRTAZAPINE 30 MG: 15 TABLET, FILM COATED ORAL at 21:33

## 2018-01-02 RX ADMIN — CARVEDILOL 6.25 MG: 6.25 TABLET, FILM COATED ORAL at 08:20

## 2018-01-02 RX ADMIN — NAFCILLIN SODIUM 2 G: 2 INJECTION, POWDER, LYOPHILIZED, FOR SOLUTION INTRAMUSCULAR; INTRAVENOUS at 16:14

## 2018-01-02 RX ADMIN — Medication 300 MG: at 08:21

## 2018-01-02 RX ADMIN — POTASSIUM CHLORIDE 20 MEQ: 20 SOLUTION ORAL at 08:20

## 2018-01-02 RX ADMIN — KETOTIFEN FUMARATE 1 DROP: 0.25 SOLUTION/ DROPS OPHTHALMIC at 08:22

## 2018-01-02 RX ADMIN — OLANZAPINE 2.5 MG: 2.5 TABLET, FILM COATED ORAL at 20:12

## 2018-01-02 NOTE — PLAN OF CARE
Problem: Patient Care Overview  Goal: Plan of Care/Patient Progress Review  SLP: Dysphagia therapy cancelled.  Pt declined participation despite meal tray being present due to fatigue.  ST to follow as indicated.

## 2018-01-02 NOTE — PROGRESS NOTES
[facility]  CARDIOLOGY DAILY PROGRESS NOTE    Interval History:   No acute hemodynamic issues  No chest pain or dyspnea  Discharging tomorrow    Objective  Temp:  [96.3  F (35.7  C)-97.9  F (36.6  C)] 97.6  F (36.4  C)  Heart Rate:  [74-95] 74  Resp:  [16-18] 16  BP: (115-124)/(74-90) 115/76  SpO2:  [94 %-98 %] 98 %   Room air     I/O last 3 completed shifts:  In: 1230 [P.O.:480; I.V.:140; NG/GT:370]  Out: 1600 [Urine:1600]     Wt Readings from Last 5 Encounters:   01/02/18 74.9 kg (165 lb 2 oz)   12/15/17 83 kg (183 lb)   11/02/17 88.3 kg (194 lb 10.7 oz)   02/06/17 91.6 kg (202 lb)   01/09/17 87.5 kg (193 lb)     GEN: no acute distress  HEENT: no icterus  CV: RRR, normal s1/s2, no murmurs/rubs/s3/s4, no heave. JVP at 7 cm.   CHEST: on room air, normal work of breathing, clear to ausculation bilaterally, no rales or wheezing  ABD: soft, non-tender, normal active bowel sounds  EXTR: pulses 2+ and equal. No clubbing, cyanosis.  Trace lower extremity edema.   NEURO: slight dysarthria, left facial droop, disoriented occasionally    Current Medications    carvedilol  6.25 mg Oral BID w/meals     OLANZapine  2.5 mg Oral BID     White Petrolatum   Topical TID     fluconazole  300 mg Oral or G tube Daily     bumetanide  1 mg Oral BID     potassium chloride  20 mEq Oral BID     protein modular  1 packet Per Feeding Tube TID     famotidine  20 mg Oral BID     nafcillin  2 g Intravenous Q4H     ceFEPIme (MAXIPIME) IV  2 g Intravenous Q8H     losartan  25 mg Oral Q12H KILO     polyethylene glycol  17 g Oral Daily     heparin  5,000 Units Subcutaneous Q8H     multivitamins with minerals  15 mL Per Feeding Tube Daily     sodium chloride (PF)  3 mL Intracatheter Q8H     senna-docusate  1-2 tablet Oral BID     mirtazapine  30 mg Oral At Bedtime     rifampin  300 mg Oral or Feeding Tube BID     ketotifen  1 drop Left Eye TID     artificial tears   Ophthalmic At Bedtime     aspirin  81 mg Oral or Feeding  Tube Daily     atorvastatin  40 mg Oral Daily       IV fluid REPLACEMENT ONLY       IV fluid REPLACEMENT ONLY       Reason beta blocker order not selected       - MEDICATION INSTRUCTIONS -       Lab Values    Recent Labs  Lab 01/02/18  0701 01/01/18  0819 12/31/17  0705 12/30/17  0703    145* 141 142   POTASSIUM 4.1 4.0 3.5 3.7   CHLORIDE 109 110* 107 108   IZABELLA 9.0 9.4 9.3 9.3   CO2 26 25 25 24   BUN 27 27 28 34*   CR 0.94 0.84 0.86 0.93   * 102* 108* 121*       Recent Labs  Lab 01/02/18  0701 01/01/18  0819 12/31/17  0705 12/30/17  0703   WBC 11.5* 12.7* 11.9* 12.5*   RBC 3.05* 3.01* 2.85* 2.97*   HGB 9.3* 9.4* 8.8* 9.1*   HCT 31.4* 31.0* 29.6* 30.3*   * 103* 104* 102*   MCH 30.5 31.2 30.9 30.6   MCHC 29.6* 30.3* 29.7* 30.0*   RDW 19.1* 19.0* 18.7* 18.6*   * 495* 458* 492*     Studies  EKG: low voltage, sinus rhythm     Transthoracic echocardiogram:   Left ventricular wall thickness is normal. Mild left ventricular dilation is present. The Ejection Fraction is estimated at 30-35%. Diastolic function not assessed due to tachycardia. Severe diffuse hypokinesis is present.     The right ventricle is normal size. Global right ventricular function is mildly reduced.  Both atria appear normal.  Trace mitral insufficiency is present.  The tricuspid valve is normal.     Vessels  The patient is post bio-Bentall - aortic root replacement with a 30 mm graft and AVR with a Trifecta valve. Today, there is circumferential free space noted surrounding the aortic graft, dehiscence and rocking of the graft from the native aorta and severe AI in the space surrounding the aortic graft (perivalvular/perigraft). The circumferential free space was present on the  previous studies of October 2017 but was filled with echodense material and the graft was not independently mobile, and there was no significant AI. The prosthetic aortic valve leaflets are not well visualized today.     No pericardial effusion is  present.     Compared to Previous Study  This study was compared with the study from 10/06/2017 . Since the prior study, the LV function has worsened and the aortic graft appears to be dehisced, as described above.    CXR  Impression:   1. Small left pleural effusion and associated retrocardiac subsegmental atelectasis and/or consolidation.  2. Stable postsurgical changes and supporting devices.   3. Stable pulmonary vascular congestion.      Assessment and Recommendation:  Cricket Miguel is a 62 year old male admitted with worsening dyspnea, thought to have acute severe AI on echocardiogram.  Pertinent history of aortic stenosis and ascending aortic aneurysm s/p AVR and root replacement April 2016.  Had complications of moises aortic abscess, endocarditis, and septic emboli to his brain.  He underwent redo sternotomy with aortic root and valve homograft 12/19.  Heart failure service is being consulted for newly reduced EF 20-25%.  Most likely related to  severe AI and recent surgery, and should improve over time.  Does not appear ischemic.     On losartan 25mg BID,  coreg 6.25 BID.  Would up titrate medications as BP tolerates  Bumex 1 mg PO BID.    Continue lipitor and asa 81.  Can follow up with Dr. Garzon as outpatient (has seen him in past).  Otherwise patient can follow up in [facility] as well.  Repeat echo in 2 months    I have discussed the above with Dr. Crook    Thank you for consulting the cardiovascular services at the [facility]. Please do not hesitate to call us with any questions.      Pardeep Cruz MD PGY4  Cardiology Fellow  [phone]

## 2018-01-02 NOTE — PROGRESS NOTES
1/2/2018   CVTS Progress Note  Attending provider: Pili Bowers,*        S:  No acute issues over night. Resting comfortably in bed. Wife did not want me to wake him.      O:   Vitals:    01/01/18 2242 01/02/18 0230 01/02/18 0832 01/02/18 1221   BP: 121/74 119/74 117/83 115/76   BP Location: Left arm Left arm  Left arm   Pulse:       Resp: 16 16 16 16   Temp: 97.8  F (36.6  C) 97.6  F (36.4  C) 96.3  F (35.7  C) 97.6  F (36.4  C)   TempSrc: Axillary Axillary Axillary Oral   SpO2: 96% 94% 97% 98%   Weight:  74.9 kg (165 lb 2 oz)       Vitals:    12/30/17 0400 12/31/17 0500 01/02/18 0230   Weight: 74 kg (163 lb 2.3 oz) 73.9 kg (163 lb) 74.9 kg (165 lb 2 oz)       Intake/Output Summary (Last 24 hours) at 01/02/18 1507  Last data filed at 01/02/18 1100   Gross per 24 hour   Intake             1230 ml   Output             1600 ml   Net             -370 ml       Gen: AxOx3, NAD  CV: RRR, no murmurs, rubs, or gallops  Pulm: CTA, no wheezing, no rhonchi  Abd: soft, NT, ND, +BS, +BM  Ext: no LE edema  Incision: c/d/i, no erythema, sternal stable  Tubes/drains: none     Labs:   BMP  Recent Labs  Lab 01/02/18  0701 01/01/18  0819 12/31/17  0705 12/30/17  0703    145* 141 142   POTASSIUM 4.1 4.0 3.5 3.7   CHLORIDE 109 110* 107 108   IZABELLA 9.0 9.4 9.3 9.3   CO2 26 25 25 24   BUN 27 27 28 34*   CR 0.94 0.84 0.86 0.93   * 102* 108* 121*     CBC  Recent Labs  Lab 01/02/18  0701 01/01/18  0819 12/31/17  0705 12/30/17  0703   WBC 11.5* 12.7* 11.9* 12.5*   RBC 3.05* 3.01* 2.85* 2.97*   HGB 9.3* 9.4* 8.8* 9.1*   HCT 31.4* 31.0* 29.6* 30.3*   * 103* 104* 102*   MCH 30.5 31.2 30.9 30.6   MCHC 29.6* 30.3* 29.7* 30.0*   RDW 19.1* 19.0* 18.7* 18.6*   * 495* 458* 492*     INRNo lab results found in last 7 days.   Hepatic Panel   Lab Results   Component Value Date    AST 32 12/18/2017     Lab Results   Component Value Date    ALT 30 12/18/2017     Lab Results   Component Value Date    BILICONJ 0.0  10/25/2010      Lab Results   Component Value Date    BILITOTAL 1.3 12/18/2017     Lab Results   Component Value Date    ALBUMIN 1.5 12/18/2017     Lab Results   Component Value Date    PROTTOTAL 7.0 12/18/2017      Lab Results   Component Value Date    ALKPHOS 92 12/18/2017         Recent Labs  Lab 01/02/18  0701 01/01/18  0819 12/31/17  0705 12/30/17  0703 12/29/17  2150 12/29/17  1703 12/29/17  1429 12/29/17  0732 12/28/17  2115 12/28/17  1914 12/28/17  1109 12/28/17  0727   * 102* 108* 121*  --   --   --  118*  --   --   --  116*   BGM  --   --   --   --  120* 167* 127*  --  148* 163* 135*  --          Imaging: none     ASSESSMENT: Cricket Miguel is a 62 year old male with hx of COPD, HTN, HLD, CAD, heart failure, aortic stenosis and ascending aortic aneurysm s/p aortic valve replacement and aortic root replacement (April 2016) c/b moises-aortic abscess, endocarditis, cerebral septic emboli and severe AI s/p redo-sternotomy aortic root and valve homograft 12/19. Post op EF 20-25%.        PLAN:   Neuro/ pain/ sedation: Hx of multifocal acute infarctions involving the left parietotemporal lobes, left cerebral hemisphere and right occipital lobe presumed to be septic emboli. Depression, mild delirium, followed by psych.  -Monitor neurological status. Notify the MD for any acute changes in exam.  -Dilaudid PRN.  -Mental status improving slowly   - On psych rec started olanzapine. Agitation improving. Serial EKGs to evaluate QT interval. QTc today 479. Psych recommended decreasing zyprexa given increased flat affect and delayed responses, however much more anxious 12/27 so dose kept at 5 mg BID. Doing better today with both anxiety and affect. Reduced to 2.5 mg BID 1/1.   - Neuro recs fluoxetine for post-stroke recovery, Psych wants to hold off on any changes to psych medications just prior to discharge. They also recommend against fluoxetine while on fluconazole.      Pulmonary care: extubated 12/20  - On  facemask 2 LPM saturation 100%.  - Encourage pulmonary toilet  - Nasotracheal suctioning  - Removed right chest tube 12/26   - Left pleural tube clamping trial okay, removed 12/27  - Vaseline to lips to avoid drying out.       Cardiovascular:    -Hx HTN, HLD, CAD, AS and aortic aneurysm s/p bentall c/b endocarditis and severe AI s/p aortic root/valve homograft 12/19.  -Keep SBP<120  - Cardiology recently started losartan 25 mg po bid. Pressure a little soft and patient reported being a little lightheaded. Gave 100 ml fluid back with improved BP. May need to reduce Losartan dosing if not tolerating but patient appeared to be dry this AM.    - Coreg 6.25 mg bid since 1/1  - ASA 81 mg and atorvastatin.  - Echo shows EF 20-25% baseline 30%   - No bradycardia or pauses, had been in NSR, TPW removed 12/26.        GI care:   - PEG  - famotidine  - senna/colace/miralax, +BM, cdiff negative 12/26       Fluids/ Electrolytes/ Nutrition:   -PRN electrolyte replacement  -Bolus tube feeds through PEG, dajuan counts       Renal/ Fluid Balance:   - Espinoza catheter for strict intake and output.  - Monitor BMP, creatinine  - IV lasix transitioned to Bumex 2 mg PO BID on 12/26, evening dose held on 12/29 due to hypotension and lightheadedness. Stable Bumex 1 mg po bid since 12/30.        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 Hx of MSSA septic arthritis and bacteremia. Hx of multifocal acute infarctions involving the left parietotemporal lobes, left cerebral hemisphere and right occipital lobe presumed to be septic emboli. ID following.   -New cultures BAL growing pseudomonas aeruginosa. ID following. Continue nafcillin/cefepime/rifampin per ID. WBC stable.    - Fluconazole for oral thrush (7 days) 12/30      ID rec's from 12/26  - restart PTA nafcillin 2g IV q4h, previously planned duration until 1/7/18  - continue rifampin 300mg PO BID, previously planned duration  until 1/7/18  -continue cefepime for 2 total weeks duration for pseudomonas pneumonia (end date 1/3/18)  -F/u surgical tissue cultures       Heme:   - Acute blood loss anemia  - Hgb stable.        Prophylaxis:    -Mechanical prophylaxis for DVT.   -Heparin TID.       Lines/ tubes/ drains:  -R triple lumen PICC, Mary garza       Disposition:  -Floor since 12/23   -Recommending discharge to TCU, however, family would like to take patient home as the facility they would prefer does not have a bed available. Patient will discharge to home tomorrow with home care and home infusion for antibiotics. Patient will need prescriptions for hospital bed and shelia lift due to his previous CVA and right sided paralysis and overall deconditioning. Prescriptions       Discussed with CVTS Fellow   Staff surgeons have been informed of changes through both  verbal and written communication.      Florence Chau PA-C  Cardiothoracic Surgery   Pager [phone]  January 2, 2018

## 2018-01-02 NOTE — CONSULTS
PSYCHIATRIC CONSULTATION         TODAY'S DATE:  01/02/2018       IDENTIFICATION:  Mr. Cricket Miguel is a 62-year-old white male who is status post a stroke.  I am asked to evaluate his antidepressant regimen and mental status by Benny Unger PA-C.      The main question for this consultation is whether the patient should be started on fluoxetine.  I note that the patient is currently taking fluconazole and there is a significant drug-drug interaction with fluconazole.  The patient was sleeping quite soundly during my interaction and the patient's daughter did not want me to wake him up as he was sleeping soundly, however, I did interview the daughter extensively and it is quite clear the family wants less medications.  They are not interested in starting any new meds at this point.  The patient is on temazepam at bedtime which would not be my first choice; however, he has been on it for some time.  At the present time, I would not change it.  He is on very low dose p.r.n. lorazepam and also very low dose Zyprexa.  His antipsychotic medications have been tapered significantly and he is on far less medications than my last consultation on 12/26.  The treatment team and the family tells me that his delirium has improved quite significantly.  The plan is to send him to [facility] for rehabilitation and then home with home cares.  It seems that now would be a poor time to make major medication changes so for now I would recommend leaving him on his same medications which seem to be working for him.  If he does develop a depressive episode, I note that in the past he was on Effexor.  An alternative would be Lexapro, which has very few drug-drug interactions.      MENTAL STATUS EXAMINATION:  On my interview, the patient was sleeping quite soundly and I was asked not to wake him up.  I note that his recent vital signs included a temperature of 97.6, heart rate of 74, respiration rate of 16 with 98% oxygen saturation  and a blood pressure of 115/76.      ASSESSMENT:  Improving delirium after cerebrovascular accident.      RECOMMENDATIONS:  Continue current medications.  I have discussed this case with both the treatment team and station .         SUZY HARRIS MD             D: 2018 12:32   T: 2018 12:54   MT: JEAN-PIERRE      Name:     ASHTYN STROUD   MRN:      [id number]        Account:       [id number]   :      1955           Consult Date:  2018      Document: [id number]

## 2018-01-02 NOTE — PROGRESS NOTES
Care Coordinator- Discharge Planning     Admission Date/Time:  12/16/2017  Attending MD:  Pili Bowers   Data  Date of initial CC assessment:  12/21/17  Chart reviewed, discussed with interdisciplinary team.   Patient was admitted for:   1. Acute bacterial endocarditis    2. 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    Assessment  Full assessment completed in previous note   Concerns with insurance coverage for discharge needs: None.  Current Living Situation: Patient lives with significant other.  Support System: Significant other and [facility]- Supportive and Involved  Services Involved: Home Infusion ([facility] # [phone]) Home care ([facility] care # [phone], RN 1x/wk, PT daily, OT daily and SP 2X/wk).  Transportation: .     Coordination of Care and Referrals: Provided patient/family with options for DME, home care, home infusion.   Per KARLENE, pt was declined from the TCU of pt's choice so pt wants to discharge to home with resumption of home care from [facility].    Pt's SO, Meghna said that pt's hospital bed and shelia lift were returned to [facility] upon pt's admission and Meghna is requesting to have these reordered. Pt already has a w/c for home use and his ride home.   Per PA, pt will need home IV abx and tube feedings arranged; pt wants to use [facility].   Intervention:   Arrangements made with [facility] (Ph: [phone] Fax: [phone]) for home IV abx and tube feeding supplies.      Arrangements made with [facility] (Ph: [phone] Fax: [phone]) for RN eval post hospitalization, assess vital signs, respiratory and cardiac status, activity tolerance, hydration, nutritional status, med set up and management. Speech Therapy eval and treat. Dietary consult. HHA for assist with ADL's. PT/OT eval and treat for deconditioning, strengthening, transfers, and endurance.      Script faxed to [facility] ([phone]  Fax: [phone]) for hospital bed and shelia lift. Awaiting insurance approval and delivery schedule.     Plan  Anticipated Discharge Date:   Anticipated Discharge Plan:  Discharge to home with home care.     MARCIA RAMOS RN BSN  Care Coordinator  [phone]  ADDENDUM: 1/3/18  D: Per SW, pt's daughter now wants pt to go to a TCU and is willing to have pt placed in an available facility.   I: Above ride home cancelled and [facility] notified of pt's delay in discharge.

## 2018-01-02 NOTE — PLAN OF CARE
Problem: Patient Care Overview  Goal: Plan of Care/Patient Progress Review  Per discussion with OT, patient with safe discharge plan in place to dc home tomorrow with all DME needs and support from  therapies. Anticipate patient would not make adequate gains by PT evaluation performed this day, therefore, defer to dc plan already in place. PT will hold evaluation pending any changes in discharge plan and will initiate if patient expecting longer LOS in this setting.

## 2018-01-02 NOTE — PLAN OF CARE
Problem: Cardiac: Heart Failure (Adult)  Goal: Signs and Symptoms of Listed Potential Problems Will be Absent, Minimized or Managed (Cardiac: Heart Failure)  Signs and symptoms of listed potential problems will be absent, minimized or managed by discharge/transition of care (reference Cardiac: Heart Failure (Adult) CPG).   Outcome: No Change   01/02/18 0100   Cardiac: Heart Failure   Problems Assessed (Heart Failure) all   Problems Present (Heart Failure) decreased quality of life;situational response     /74 (BP Location: Left arm)  Pulse 102  Temp 97.6  F (36.4  C) (Axillary)  Resp 16  Wt 74.9 kg (165 lb 2 oz)  SpO2 94%  BMI 25.11 kg/m2    Pt here s/p aortic replacement and redo sternotomy following complications of AVR/aortic aneurysm/aortic root replacement from 4-2016. Hx of stroke w/ R hemiparesis and L-facial droop.  SR and on RA all meds via G-tube and tolerating well. Slept btw cares and No BM during the shift.  Condom cath exchanged, waiting for placement to TCU/REHAB.  Will continue with cares and notify provider with status changes.

## 2018-01-02 NOTE — PLAN OF CARE
Problem: Patient Care Overview  Goal: Plan of Care/Patient Progress Review  D/I/A: Pt here s/p aortic replacement and redo sternotomy following complications of AVR/aortic aneurysm/aortic root replacement from 4-2016. Hx of stroke w/ R hemiparesis/facial droop. Pt noted to be somnolent this AM and not following commands, but later perked up when different staff entered room. Coccyx red blanchable, barrier cream applied. Pt has multiple open bleeding areas in perineum; barrier cream applied, WOC RN consulted for treatment plan; it does not appear to be improving w/ criticaid alone Condom cath replaced. Total feed, fair appetite this afternoon. K 4.0 replaced per protocol. IV ABX infusing per MAR. Tube feeds given bolus per order. UA sent.  P: Rehab when bed available.

## 2018-01-02 NOTE — PROGRESS NOTES
Social Work Services Progress Note    Hospital Day: 17  Date of Initial Social Work Evaluation:  12/26/17  Collaborated with: Meghna (SO), Eloy (daughter), CVTS team, SNF admissions:  [facility],  TC[facility]    Data:  Pt is a 62 year old male being followed by SW for placement to rehab.  SW has been getting denials for pt due to pt's cognition, medical needs and rehab potential    Intervention:  SW called pt's daughter Eloy and spoke with VERONICA Coffman in person.  Meghna at this time is not wanting to pursue any other placements other than [facility].  At this time pt is denied to the following facilities:      - [facility]: denied and will not be waitlisted due to being too medically complex for both the TCU and cognitive impairment TCU   - [facility]: does not have a private room for pt   -  TCU: not a short term rehab candidate    SO is aware that pt is declined and is not wanting any other referrals made this admission.  SO called [facility] and will be pursuing homecare services.  SO will need to work with [facility] in the future if the pt is wanting to go for rehab.  Pt cannot be wait-listed this admission per rehab team.    CVTS team continues to recommend TCU for deconditioning post surgery.  SO aware.    Assessment:  Pt sleeping soundly in bed.  SW worked with VERONICA Coffman in person.    Plan:    Anticipated Disposition:  Home with services - per SO choice    Barriers to d/c plan: Resumption of homecare orders and equipment.    Follow Up: RNCC to follow for discharge planning to home.    ANTON Church, APSW  [address] Unit   Phone: [phone]  Pager: [phone]  Unit: [phone]

## 2018-01-02 NOTE — PLAN OF CARE
Problem: Patient Care Overview  Goal: Plan of Care/Patient Progress Review  Discharge Planner OT   Patient plan for discharge: Rehab   Current status: Dependent with changing brief/moises cares from bed level.  Min A for log roll to R and max A for rolling to L.  Pt dependently lifted to bedside chair for engagement in seated ADLs - pt performs modified UB sponge bath, application of deodorant, and UB dressing with max A and hand over hand technique to implement R hand in task and promote neuro re-education.  Pt following ~25% of commands and demonstrates agnosia with familiar ADL supplies.   Barriers to return to prior living situation: Dependent with transfers, max A for ADLs, cognition deficits  Recommendations for discharge: TCU   Rationale for recommendations: To improve pt's safety and IND with ADLs and transfers prior to return home and to decrease amount of caregiver burden.       Entered by: Rafael Ny 01/02/2018 8:57 AM

## 2018-01-03 ENCOUNTER — TELEPHONE (OUTPATIENT)
Dept: CARDIOLOGY | Facility: CLINIC | Age: 63
End: 2018-01-03

## 2018-01-03 ENCOUNTER — APPOINTMENT (OUTPATIENT)
Dept: SPEECH THERAPY | Facility: CLINIC | Age: 63
DRG: 219 | End: 2018-01-03
Attending: INTERNAL MEDICINE
Payer: COMMERCIAL

## 2018-01-03 ENCOUNTER — TELEPHONE (OUTPATIENT)
Dept: FAMILY MEDICINE | Facility: CLINIC | Age: 63
End: 2018-01-03

## 2018-01-03 ENCOUNTER — HOME INFUSION (PRE-WILLOW HOME INFUSION) (OUTPATIENT)
Dept: PHARMACY | Facility: CLINIC | Age: 63
End: 2018-01-03

## 2018-01-03 DIAGNOSIS — I50.22 CHRONIC SYSTOLIC HEART FAILURE (H): Primary | ICD-10-CM

## 2018-01-03 DIAGNOSIS — G51.0 FACIAL PARALYSIS: Primary | ICD-10-CM

## 2018-01-03 LAB
BACTERIA SPEC CULT: NORMAL
Lab: NORMAL
MAGNESIUM SERPL-MCNC: 2.5 MG/DL (ref 1.6–2.3)
SPECIMEN SOURCE: NORMAL

## 2018-01-03 PROCEDURE — 25000132 ZZH RX MED GY IP 250 OP 250 PS 637: Performed by: SURGERY

## 2018-01-03 PROCEDURE — 25000132 ZZH RX MED GY IP 250 OP 250 PS 637: Performed by: PHYSICIAN ASSISTANT

## 2018-01-03 PROCEDURE — 40000802 ZZH SITE CHECK

## 2018-01-03 PROCEDURE — 21400006 ZZH R&B CCU INTERMEDIATE UMMC

## 2018-01-03 PROCEDURE — 25000132 ZZH RX MED GY IP 250 OP 250 PS 637: Performed by: INTERNAL MEDICINE

## 2018-01-03 PROCEDURE — 36592 COLLECT BLOOD FROM PICC: CPT | Performed by: SURGERY

## 2018-01-03 PROCEDURE — 25000132 ZZH RX MED GY IP 250 OP 250 PS 637: Performed by: HOSPITALIST

## 2018-01-03 PROCEDURE — 40000225 ZZH STATISTIC SLP WARD VISIT

## 2018-01-03 PROCEDURE — 83735 ASSAY OF MAGNESIUM: CPT | Performed by: SURGERY

## 2018-01-03 PROCEDURE — 99232 SBSQ HOSP IP/OBS MODERATE 35: CPT

## 2018-01-03 PROCEDURE — 25000128 H RX IP 250 OP 636: Performed by: PHYSICIAN ASSISTANT

## 2018-01-03 PROCEDURE — 40000558 ZZH STATISTIC CVC DRESSING CHANGE

## 2018-01-03 PROCEDURE — 25000132 ZZH RX MED GY IP 250 OP 250 PS 637: Performed by: ANESTHESIOLOGY

## 2018-01-03 PROCEDURE — 92526 ORAL FUNCTION THERAPY: CPT | Mod: GN

## 2018-01-03 PROCEDURE — 25000125 ZZHC RX 250: Performed by: PHYSICIAN ASSISTANT

## 2018-01-03 RX ORDER — ATORVASTATIN CALCIUM 40 MG/1
40 TABLET, FILM COATED ORAL DAILY
Qty: 60 TABLET | Refills: 1 | Status: SHIPPED | OUTPATIENT
Start: 2018-01-03 | End: 2018-01-04

## 2018-01-03 RX ORDER — OLANZAPINE 2.5 MG/1
2.5 TABLET, FILM COATED ORAL 2 TIMES DAILY
Qty: 60 TABLET | Refills: 0 | Status: SHIPPED | OUTPATIENT
Start: 2018-01-03 | End: 2018-01-04

## 2018-01-03 RX ORDER — ASPIRIN 81 MG/1
81 TABLET, CHEWABLE ORAL DAILY
Qty: 60 TABLET | Refills: 1 | Status: SHIPPED | OUTPATIENT
Start: 2018-01-03 | End: 2018-01-04

## 2018-01-03 RX ORDER — BUMETANIDE 1 MG/1
1 TABLET ORAL
Qty: 60 TABLET | Refills: 0 | Status: SHIPPED | OUTPATIENT
Start: 2018-01-03 | End: 2018-01-04

## 2018-01-03 RX ORDER — FLUCONAZOLE 40 MG/ML
300 POWDER, FOR SUSPENSION ORAL DAILY
Qty: 37.5 ML | Refills: 0 | Status: SHIPPED | OUTPATIENT
Start: 2018-01-03 | End: 2018-01-04

## 2018-01-03 RX ORDER — MIRTAZAPINE 30 MG/1
30 TABLET, FILM COATED ORAL AT BEDTIME
Qty: 30 TABLET | Refills: 1 | Status: SHIPPED | OUTPATIENT
Start: 2018-01-03 | End: 2018-01-04

## 2018-01-03 RX ORDER — AMOXICILLIN 250 MG
1-2 CAPSULE ORAL 2 TIMES DAILY
Qty: 100 TABLET | Refills: 0 | Status: SHIPPED | OUTPATIENT
Start: 2018-01-03 | End: 2018-01-04

## 2018-01-03 RX ORDER — ONDANSETRON 4 MG/1
4 TABLET, ORALLY DISINTEGRATING ORAL EVERY 6 HOURS PRN
Qty: 120 TABLET | Refills: 1 | Status: SHIPPED | OUTPATIENT
Start: 2018-01-03 | End: 2018-01-04

## 2018-01-03 RX ORDER — FAMOTIDINE 20 MG/1
20 TABLET, FILM COATED ORAL 2 TIMES DAILY
Qty: 60 TABLET | Refills: 1 | Status: SHIPPED | OUTPATIENT
Start: 2018-01-03 | End: 2018-01-04

## 2018-01-03 RX ORDER — LOSARTAN POTASSIUM 25 MG/1
25 TABLET ORAL EVERY 12 HOURS
Qty: 60 TABLET | Refills: 1 | Status: SHIPPED | OUTPATIENT
Start: 2018-01-03 | End: 2018-01-04

## 2018-01-03 RX ORDER — POLYETHYLENE GLYCOL 3350 17 G/17G
17 POWDER, FOR SOLUTION ORAL DAILY
Qty: 7 PACKET | Refills: 1 | Status: SHIPPED | OUTPATIENT
Start: 2018-01-03 | End: 2018-01-04

## 2018-01-03 RX ORDER — CARBOXYMETHYLCELLULOSE SODIUM 5 MG/ML
1 SOLUTION/ DROPS OPHTHALMIC 3 TIMES DAILY PRN
Qty: 1 BOTTLE | Refills: 1 | Status: SHIPPED | OUTPATIENT
Start: 2018-01-03 | End: 2018-01-04

## 2018-01-03 RX ORDER — LORAZEPAM 0.5 MG/1
0.5 TABLET ORAL 2 TIMES DAILY PRN
Qty: 60 TABLET | Refills: 0 | Status: SHIPPED | OUTPATIENT
Start: 2018-01-03 | End: 2018-01-01

## 2018-01-03 RX ORDER — POTASSIUM CHLORIDE 20MEQ/15ML
20 LIQUID (ML) ORAL 2 TIMES DAILY
Qty: 900 ML | Refills: 0 | Status: SHIPPED | OUTPATIENT
Start: 2018-01-03 | End: 2018-01-04

## 2018-01-03 RX ORDER — CARVEDILOL 6.25 MG/1
6.25 TABLET ORAL 2 TIMES DAILY WITH MEALS
Qty: 60 TABLET | Refills: 1 | Status: SHIPPED | OUTPATIENT
Start: 2018-01-03 | End: 2018-01-04

## 2018-01-03 RX ORDER — OXYCODONE HYDROCHLORIDE 5 MG/1
5 TABLET ORAL EVERY 4 HOURS PRN
Qty: 30 TABLET | Refills: 0 | Status: SHIPPED | OUTPATIENT
Start: 2018-01-03 | End: 2018-01-04

## 2018-01-03 RX ADMIN — NAFCILLIN SODIUM 2 G: 2 INJECTION, POWDER, LYOPHILIZED, FOR SOLUTION INTRAMUSCULAR; INTRAVENOUS at 08:17

## 2018-01-03 RX ADMIN — LOSARTAN POTASSIUM 25 MG: 25 TABLET ORAL at 20:48

## 2018-01-03 RX ADMIN — KETOTIFEN FUMARATE 1 DROP: 0.25 SOLUTION/ DROPS OPHTHALMIC at 15:00

## 2018-01-03 RX ADMIN — BUMETANIDE 1 MG: 1 TABLET ORAL at 16:33

## 2018-01-03 RX ADMIN — POTASSIUM CHLORIDE 20 MEQ: 20 SOLUTION ORAL at 20:48

## 2018-01-03 RX ADMIN — Medication 1 PACKET: at 16:33

## 2018-01-03 RX ADMIN — NAFCILLIN SODIUM 2 G: 2 INJECTION, POWDER, LYOPHILIZED, FOR SOLUTION INTRAMUSCULAR; INTRAVENOUS at 04:00

## 2018-01-03 RX ADMIN — Medication 1 PACKET: at 08:56

## 2018-01-03 RX ADMIN — FAMOTIDINE 20 MG: 20 TABLET ORAL at 08:24

## 2018-01-03 RX ADMIN — ASPIRIN 81 MG CHEWABLE TABLET 81 MG: 81 TABLET CHEWABLE at 08:24

## 2018-01-03 RX ADMIN — KETOTIFEN FUMARATE 1 DROP: 0.25 SOLUTION/ DROPS OPHTHALMIC at 20:49

## 2018-01-03 RX ADMIN — MIRTAZAPINE 30 MG: 15 TABLET, FILM COATED ORAL at 22:40

## 2018-01-03 RX ADMIN — HEPARIN SODIUM 5000 UNITS: 5000 INJECTION, SOLUTION INTRAVENOUS; SUBCUTANEOUS at 04:02

## 2018-01-03 RX ADMIN — NAFCILLIN SODIUM 2 G: 2 INJECTION, POWDER, LYOPHILIZED, FOR SOLUTION INTRAMUSCULAR; INTRAVENOUS at 11:27

## 2018-01-03 RX ADMIN — Medication 300 MG: at 08:19

## 2018-01-03 RX ADMIN — KETOTIFEN FUMARATE 1 DROP: 0.25 SOLUTION/ DROPS OPHTHALMIC at 08:39

## 2018-01-03 RX ADMIN — CEFEPIME 2 G: 1 INJECTION, SOLUTION INTRAVENOUS at 04:00

## 2018-01-03 RX ADMIN — Medication 1 PACKET: at 20:49

## 2018-01-03 RX ADMIN — Medication 300 MG: at 20:48

## 2018-01-03 RX ADMIN — ATORVASTATIN CALCIUM 40 MG: 40 TABLET, FILM COATED ORAL at 08:24

## 2018-01-03 RX ADMIN — NAFCILLIN SODIUM 2 G: 2 INJECTION, POWDER, LYOPHILIZED, FOR SOLUTION INTRAMUSCULAR; INTRAVENOUS at 16:35

## 2018-01-03 RX ADMIN — CEFEPIME 2 G: 1 INJECTION, SOLUTION INTRAVENOUS at 11:27

## 2018-01-03 RX ADMIN — NAFCILLIN SODIUM 2 G: 2 INJECTION, POWDER, LYOPHILIZED, FOR SOLUTION INTRAMUSCULAR; INTRAVENOUS at 20:48

## 2018-01-03 RX ADMIN — FLUCONAZOLE 300 MG: 40 POWDER, FOR SUSPENSION ORAL at 08:31

## 2018-01-03 RX ADMIN — CARVEDILOL 6.25 MG: 6.25 TABLET, FILM COATED ORAL at 18:09

## 2018-01-03 RX ADMIN — BUMETANIDE 1 MG: 1 TABLET ORAL at 08:24

## 2018-01-03 RX ADMIN — HEPARIN SODIUM 5000 UNITS: 5000 INJECTION, SOLUTION INTRAVENOUS; SUBCUTANEOUS at 20:48

## 2018-01-03 RX ADMIN — CARVEDILOL 6.25 MG: 6.25 TABLET, FILM COATED ORAL at 08:24

## 2018-01-03 RX ADMIN — POTASSIUM CHLORIDE 20 MEQ: 20 SOLUTION ORAL at 08:30

## 2018-01-03 RX ADMIN — OLANZAPINE 2.5 MG: 2.5 TABLET, FILM COATED ORAL at 20:48

## 2018-01-03 RX ADMIN — HEPARIN SODIUM 5000 UNITS: 5000 INJECTION, SOLUTION INTRAVENOUS; SUBCUTANEOUS at 13:31

## 2018-01-03 RX ADMIN — MULTIVITAMIN 15 ML: LIQUID ORAL at 08:22

## 2018-01-03 RX ADMIN — LOSARTAN POTASSIUM 25 MG: 25 TABLET ORAL at 08:24

## 2018-01-03 RX ADMIN — FAMOTIDINE 20 MG: 20 TABLET ORAL at 20:48

## 2018-01-03 RX ADMIN — MINERAL OIL AND WHITE PETROLATUM: 150; 830 OINTMENT OPHTHALMIC at 21:02

## 2018-01-03 RX ADMIN — OLANZAPINE 2.5 MG: 2.5 TABLET, FILM COATED ORAL at 08:24

## 2018-01-03 RX ADMIN — Medication: at 20:49

## 2018-01-03 NOTE — PLAN OF CARE
Problem: Patient Care Overview  Goal: Plan of Care/Patient Progress Review    D: S/P redo aortic root and aortic valve replacement 12/19. Hx of COPD, HTN, HLD, CAD, AVR and AAA 4/16 c/b endocarditis and cerebral septic emboli.     I/A: Vital signs stable, afebrile, A&Ox4, sinus rhythm. Denied pain overnight. Turned/repostioned Q2H. Incontinent of urine, no BM overnight. Meds crushed down G tube. Bolus feeding during the day. IV antibiotics given as scheduled. Incisions CDI. Slept well between cares.     P: Plan is to d/c home today with home care at 1200. Continue to monitor and notify MD with pertinent changes.

## 2018-01-03 NOTE — PROGRESS NOTES
CLINICAL NUTRITION SERVICES    Reason for Assessment:  Heart-healthy nutrition education, received consult.    Diet History:  Pt reports history of receiving heart-healthy nutrition education in the past, but wanted a refresher.      Nutrition Diagnosis:  Food- and nutrition-related knowledge deficit r/t limited previous knowledge of heart-healthy diet AEB pt and family report of limited previous formal heart-healthy nutrition education.    Nutrition Prescription/Recs:  Continue heart-healthy diet.  Continue DD1 Pureed diet with thin liquids per care team.     Interventions:  Nutrition Education  1.  Provided verbal instruction on a heart-healthy diet.  2.  Provided handouts:  Heart Health Guidelines (includes Heart Healthy Food Choices), Your Heart-Healthy Diet (Words You Will Hear), 10 Tips for Heart-Healthy Cooking, Dining Out With Your Heart In Mind, Recipe Changes for Heart-Healthy Cooking,  How to Read Nutrition Labels, Low-Sodium Foods and Drinks, Tips for a Low-Sodium Diet, and Seasoning Your Foods Without Adding Salt, National Dysphagia Diet Pureed Nutrition Therapy     Goals:    1.  Pt and family will verbalize at least three foods high in saturated or trans-fats and three foods high in sodium.    2.  Pt and family will list at least two interventions to make current meal plan more heart-healthy.     Follow-up:   Patient to ask any further nutrition-related questions before discharge.  In addition, pt may request outpatient RD appointment.    Florence Brennan, Dietetic Intern    I have read and agree with the above assessment, evaluation, interventions and recommendations.    Reyna Dutton, JASON, LD (pgr [phone])

## 2018-01-03 NOTE — DISCHARGE INSTRUCTIONS
[facility]      AFTER YOU GO HOME FROM YOUR HEART SURGERY    You had a full sternotomy, so avoid lifting anything greater than ten pounds for 6 weeks after surgery and then less than 20 pounds for an additional 6 weeks.    No driving for 4 weeks after surgery or while on pain medication. If you had a minimally invasive procedure.      Avoid strenuous activities such as bowling, vacuuming, raking, shoveling, golf or tennis for 12 weeks after your surgery. It is okay to resume sex if you feel comfortable in doing so. You may have to try different positions with your partner.     Splint your chest incision by hugging a pillow or bringing your arms across your chest when coughing or sneezing. Avoid pushing off with your arms when getting up for the first month if you have had your sternum opened.    Shower or wash your incisions daily with soap and water (or as instructed), pat dry. Keep wound clean and dry, showers are okay after discharge, but don't let spray hit directly on incision. No baths or swimming for 1 month.  Clean wounds twice a day for 2 weeks with microklenz spray if available. Cover chest tube sites with gauze until they stop draining, then leave open. It is not abnormal for chest tube sites to drain yellowish/clear fluid for up to 2-3 weeks after surgery.   Watch for signs of infection: increased redness, tenderness, warmth or any drainage that appears infected (pus like) or is persistent.  Also a temperature > 100.5 F or chills. Call your surgeon or primary care provider's office immediately. Remove any skin glue left on incisions after 10 days. This will not affect your incision and can speed up healing.    Exercise is very important in your recovery. Please follow the guidelines set up for you in your cardiac rehab classes at the hospital. If outpatient cardiac rehab was ordered for you, we highly recommend you participate. If you have problems arranging your cardiac  rehab, please call [phone].     Avoid sitting for prolonged periods of time, try to walk every hour during the day. If you have a leg incision, elevate your leg often when you are not walking.    Check your weight when you get home from the hospital and continue to check it daily through your recovery for at least a month. If you notice a weight gain of 2-3 pounds in a week, notify your primary care physician, cardiologist or surgeon.    Bowel activity may be slow after surgery. If necessary, you may take an over the counter laxative such as Milk of Magnesia or Miralax. You may have stool softeners prescribed (docusate sodium, Senokot). We recommend using stool softeners while using narcotics for pain (oxycodone/percocet, hydrocodone/vicodin).      DENTAL VISITS AFTER SURGERY  If you have had your heart valve repaired or replaced, we do not recommend having any dental work done for 6 months and you will need to take an antibiotic prior to dental visits from now on.  Please notify your dentist before any procedure for the proper treatment needed. The antibiotic is taken by mouth one hour prior to visit. This includes routine cleanings.    DO NOT SMOKE.  IF YOU NEED HELP QUITTING, PLEASE TALK WITH YOUR CARDIOLOGIST OR PRIMARY DOCTOR.    If you are on a blood thinner, follow the instructions you were given in the hospital and DO NOT SKIP this medication. Try and take it the same time everyday. Your primary care physician or coumadin clinic will manage the dosing.     If you have an LVAD device call your LVAD coordinator.   If you had a heart transplant call your Transplant Coordinator.    REGARDING PRESCRIPTION REFILLS.  If you need a refill on your pain medication contact us.  All other medications will be adjusted, discontinued and re-filled by your primary care physician and/or your cardiologist as they were prior to your surgery. We have given you enough for one to three month with possibly one  refill.    POST-OPERATIVE CLINIC VISITS  You have a follow up visit with CVTS Surgery Advance Care Practitioners in 1-2 weeks at the [facility].  You will then return to the care of your primary physician and your cardiologist.   It is important to call for an appointment to see your cardiologist in 4-6 weeks after surgery and your primary care physician in 2-4 weeks after surgery.   If there is a need to return to see CT Surgery please call our  at [phone].    SURGICAL QUESTIONS  Please call Kristy Rubi with any surgical questions, her phone number is listed below.  She can assist you with your needs and contact other surgery care team members as indicated.    For general questions or concerns, please call the Cardiothoracic Surgery Department at [phone] 8-4:30 M-F.   On weekends or after hours, please call [phone] and ask the  to   page the Cardiothoracic Surgery fellow on call.      Thank you,    Your Cardiothoracic Surgery Team  Kristy Rubi RN Care Coordinator-  [phone]   Florence Unger PA-C

## 2018-01-03 NOTE — PROGRESS NOTES
This is a recent snapshot of the patient's [facility] medical record.  For current drug dose and complete information and questions, call [phone] or In [facility] pool, [facility] home infusion ([id number])  CSN Number:  [id number]

## 2018-01-03 NOTE — CONSULTS
PM&R CONSULT    Given that the patient does not have new onset stroke, the consult will be deferred to the admission team for [facility].  Please call if there are any questions at [phone]        Thank you for consulting the PM&R Department.   For any questions, please feel free to page me at [phone]       Lisa Painting MD   Department of PM&R

## 2018-01-03 NOTE — PLAN OF CARE
Problem: Patient Care Overview  Goal: Plan of Care/Patient Progress Review  Patient s/p redo sternotomy and AVR 12/19. VSS, denies pain. NSR. History of stroke with right sided weakness and left sided facial droop. Magnesium (2.0) replaced per unit protocol. Tube feeding boluses at 1000 and 1600. Dysphagia 1 diet with thin liquids. Incontinent of stool and urine, criticaid paste applied. Lift dependent. Anticipate discharge home tomorrow with home care. Wheelchair ride scheduled for 1200 tomorrow. Continue to assess and monitor, notify CVTS with concerns.

## 2018-01-03 NOTE — DISCHARGE SUMMARY
[facility], [place]   Cardiothoracic Surgery Hospital Discharge Summary       Cricket Miguel MRN# [id number]   YOB: 1955 Age: 62 year old     Date of Admission:  12/16/2017  Date of Discharge::  1/4/2018  Admitting Physician:  Pili Bowers MD  Discharge Physician:  Pili Bowers MD  Primary Care Physician:        Dipak Gordillo          Admission Diagnoses:   MSSA aortic valve endocarditis  Aortic root pseudoaneurysm   History of aortic insufficiency - S/p Bentall procedure 5/2016  Chronic systolic heart failure - EF 30-35%  History of CVA  Hypertension  Dyslipidemia  Depression  Anxiety          Discharge Diagnosis:   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 Pili Bowers and Dr. Charles Nogueira:  Redo sternotomy, femoral arterial and venous cannulation for cardiopulmonary bypass, aortic root and aortic valve replacement with a 26 mm [employer] valve conduit homograft, intraoperative OWEN.            Consultations:   CARDIAC REHAB IP CONSULT  CARDIOTHORACIC SURGERY ADULT IP CONSULT  VASCULAR ACCESS CARE ADULT IP CONSULT  PSYCHIATRY IP CONSULT  INFECTIOUS DISEASE GENERAL ADULT IP CONSULT  PSYCHOLOGY ADULT IP CONSULT  SWALLOW EVAL SPEECH PATH AT BEDSIDE IP CONSULT  PHARMACY IP CONSULT  NUTRITION SERVICES ADULT IP CONSULT  CARDIAC REHAB IP CONSULT  PHYSICAL THERAPY ADULT IP CONSULT  OCCUPATIONAL THERAPY ADULT IP CONSULT  RESPIRATORY CARE IP CONSULT  CARDIOLOGY ELECTROPHYSIOLOGY (EP) IP CONSULT  CARDIOLOGY ELECTROPHYSIOLOGY (EP) IP CONSULT  NUTRITION SERVICES ADULT IP CONSULT  PHARMACY IP CONSULT  PHARMACY TO  DOSE VANCO  PHYSICAL THERAPY ADULT IP CONSULT  OCCUPATIONAL THERAPY ADULT IP CONSULT  OTOLARYNGOLOGY IP CONSULT  CARDIOLOGY HEART FAILURE (HF) IP CONSULT  PSYCHIATRY IP CONSULT  PSYCHIATRY IP CONSULT  ENT IP CONSULT  NEUROLOGY STROKE ADULT IP CONSULT  WOUND OSTOMY CONTINENCE NURSE  IP CONSULT  PSYCHIATRY IP CONSULT  CORE CLINIC EVALUATION IP CONSULT  SMOKING CESSATION PROGRAM IP CONSULT          Brief History of Illness:   Cricket Miguel is a 62 year old male with a past medical history of aortic stenosis and ascending aortic aneurysm repair with history of sepsis of unknown origin in April 2016, found to have aortic stenosis and ascending aortic aneurysm. He is s/p Bentall procedure on 5/16 with Dr. Bowers. He was discharged to home in stable condition at that time. He represented on October 6, 2017 with AMS after a fall and was found to have a CVA with and found to have a fluid collection concerning for a large pariaortic abscess around the replaced aortic root that was the source of his septic emboli leading to his CVA. He received a trach and PEG and was discharged to a rehab center and had been receiving IV antibiotics through a PICC in his R arm. He has risidual right sided UE weakness and left sided facial droop. He is able to swallow but also gets nutrition the PEG.  He was in his usual state of health undergoing rehabilitation when over the past 2 days he developed increasing shortness of breath. He went to [facility] ER today and was found to be volume overloaded. He received Lasix and was transferred here for further care. Initially there was a plan for him to have a graft replacement surgery in January. Echo on 12/17/2017 showed severe aortic insufficiency with communication between the native aorta and the graft. CVTS was consulted for consideration of repair of aortic root pseudoaneurysm.            Hospital Course:       Cricket Miguel is a 62 year old male with hx of COPD, HTN, HLD, CAD,  heart failure, aortic stenosis and ascending aortic aneurysm s/p aortic valve replacement and aortic root replacement (April 2016) c/b moises-aortic abscess, endocarditis, cerebral septic emboli and severe AI s/p redo-sternotomy aortic root and valve homograft 12/19. Post op EF 20-25%.        PLAN:   Neuro/ pain/ sedation: Hx of multifocal acute infarctions involving the left parietotemporal lobes, left cerebral hemisphere and right occipital lobe presumed to be septic emboli. History of depression and anxiety. Had some delirium post operatively. Patient was followed by both psychiatry and neurology post operatively. His delirium has resolved and is mental status has significantly improved. He is alert and oriented x 3. His daughter Eloy is his medical decision maker as psychiatry does not believe he has the capacity at this time to be his own decision maker. His partner Meghna is very involed in his care and is his caregiver at home. He has been stable on his current regimen of zyprexa 2.5 mg bid, it was decreased 1/1/18 given increased flat affect and delayed responses. This has since resolved and he is doing better with both anxiety and affect. He is also on mirtazapine at night for sleep and PRN Lorazepam for anxiety. Neurology recommended fluoxetine for post-stroke recovery, however, Psych wanted to hold off on any changes to psych medications just prior to discharge. They also recommend against fluoxetine or Effexor at this time due to patient's long Qtc interval, 479 ms on 12/27. Patient did take Effexor prior to surgery for his depression. Should his depression become an issue psychiatry recommends starting Lexapro, as it has very few drug-drug interactions. Per neurology note on 12/29/17, they believe he should have continued functional improvement to some degree.      Pulmonary care: extubated 12/20  - Removed right chest tube 12/26   - Left pleural tube  removed 12/27  - Not requiring suctioning anymore  -  On room air with saturations 97%  - Continue incentive spirometry and pulmonary toilet.         Cardiovascular:    -Hx HTN, HLD, CAD, AS and aortic aneurysm s/p bentall c/b endocarditis and severe AI s/p aortic root/valve homograft 12/19.  - Post operative echo showing EF of 20%, baseline 30%, heart failure has been following patient for medical management.    -Keep SBP<120  - Tolerating current medications well: Losartan 25 mg daily, Coreg 6.25 mg bid since 1/1, ASA 81 mg and atorvastatin.  - No bradycardia or pauses, had been in NSR, TPW removed 12/26.   - patient will follow up with Dr. Garzon his cardiologist on 1/12/18 in [place].   - He will follow up with CV surgery for post op visit 1/19/18 at 1:30pm.        GI care:   - PEG  - famotidine  - some loose stools but no frequent diarrhea, +BM, cdiff negative 12/26       Fluids/ Electrolytes/ Nutrition:   -PRN electrolyte replacement  -Bolus tube feeds through PEG bid, calorie counts to be performed by Nutrition. Some concerns by patient's partner that tube feeding causing liquid stools. Will not change regimen this close to discharge as Nutrition believes loose stools more likely caused by patient's multivitamin. I did changed multivitamin per Nutrition today.   - Laxatives also changed to PRN instead of scheduled  - Advanced to regular diet with thins 1/4 per speech.  Pt should be alert, upright, have 1:1 assist. Safe swallow strategies (written in room): chew on R (strong side), place straw in R corner of his mouth, encourage slow and small sips/bites.  - patient uses bed pan for stooling.       Renal/ Fluid Balance:   - patient is incontinent of urine  - Monitor BMP, creatinine  - Stable Bumex 1 mg po bid since 12/30 with supplemental potassium.        Endocrine:   - blood sugars WNL, is not diabetic. No insulin required.        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 Hx of MSSA septic arthritis and bacteremia. Hx  "of multifocal acute infarctions involving the left parietotemporal lobes, left cerebral hemisphere and right occipital lobe presumed to be septic emboli. ID following.   - Bronchial lavage cultures gerw pseudomonas aeruginosa on 12/20/17. Infectious disease team followed patient throughout hospital stay. He completed cefepime for 2 total weeks duration for pseudomonas pneumonia (end date 1/3/18) WBC stable 11.5. He is afebrile on RA.   - Fluconazole for oral thrush (7 days) 12/30, d/c given risk of qt prolongation on 1/3      ID rec's from 12/26  - restart PTA nafcillin 2g IV q4h, previously planned duration until 1/7/18  - continue rifampin 300mg PO BID, previously planned duration until 1/7/18  - Surgical tissue cultures 12/19- they have remained negative after 2 weeks. No further follow up needed with Infectious Diease per them.        Heme:   - Acute blood loss anemia  - Hgb stable at 9.3.  - No evidence of bleeding         Prophylaxis:    -Mechanical prophylaxis for DVT.   -Heparin subq TID for DVT prophylaxis while at rehab facility.       Lines/ tubes/ drains:  -R triple lumen PICC for antibiotic infusion. May remove after antibiotics stopped.        Disposition:  - Patient will discharge to [facility] today 1/4/18 at 1630 with approval of family.         Day of Discharge Exam   Vitals:  Vital signs:  Temp: 98  F (36.7  C) Temp src: Oral BP: 111/71 Pulse: 68 Heart Rate: 78 Resp: 18 SpO2: 97 % O2 Device: None (Room air) Oxygen Delivery: 2 LPM   Weight: 74.9 kg (165 lb 2 oz)  Estimated body mass index is 25.11 kg/(m^2) as calculated from the following:    Height as of 12/15/17: 1.727 m (5' 8\").    Weight as of this encounter: 74.9 kg (165 lb 2 oz).    Physical Exam:   Gen: A&O to self, NAD, conversational  CV: RRR, S1S2 normal, no murmurs, rubs, or gallops  Pulm: Lungs CTA, no rhonchi or wheezes  Abd: +BS, Soft, nondistended, NTTP, G-tube present  Ext: No lower extremity edema  Neuro: Left facial droop, right " upper and lower extremity weakness, 2/5, deconditioning of left upper and lower extremities.   Incision: Clean, dry, intact, no erythema  Chest Tube sites: Dressings clean and dry      BMP    Recent Labs  Lab 18  0626 18  0746 18  0701 18  0819 17  0705 17  0703   NA  --   --  141 145* 141 142   POTASSIUM  --   --  4.1 4.0 3.5 3.7   CHLORIDE  --   --  109 110* 107 108   CO2  --   --  26 25 25 24   BUN  --   --  27 27 28 34*   CR  --   --  0.94 0.84 0.86 0.93   GLC  --   --  113* 102* 108* 121*   MAG 2.1 2.5* 2.0 2.1  --  2.2     CBC    Recent Labs  Lab 18  0701 18  0819 17  0705 17  0703   WBC 11.5* 12.7* 11.9* 12.5*   RBC 3.05* 3.01* 2.85* 2.97*   HGB 9.3* 9.4* 8.8* 9.1*   HCT 31.4* 31.0* 29.6* 30.3*   * 103* 104* 102*   MCH 30.5 31.2 30.9 30.6   MCHC 29.6* 30.3* 29.7* 30.0*   RDW 19.1* 19.0* 18.7* 18.6*   * 495* 458* 492*     INRNo lab results found in last 7 days.   Hepatic Panel No lab results found in last 7 days.         Imaging Studies:   External Result Report   External Result Report   PACS Images   Show images for ECHO LIMITED WITH OPTISON   ECHO LIMITED WITH OPTISON   Order: [id number]   Status:  Edited Result - FINAL   Visible to patient:  No (Not Released) Next appt:  2018 at 06:00 AM in Occupational Therapy (Alka Romero, ANT)   Details   Reading Physician Reading Date Result Priority   TRIP Conway MD 2017    Narrative         [id number]  [id number]  [id number]  [id number]^CATALINO^DOMINICK^[facility],[place]  Echocardiography Laboratory  [address] [place] [address]     Name: ASHTYN STROUD  MRN: [id number]  : 1955  Study Date: 2017 11:02 AM  Age: 62 yrs  Gender: Male  Patient Location: [facility]  Reason For Study: Aortic Aneurysm- limited for FX  History: Aortic Aneurysm- limited for FX  Ordering Physician: DOMINICK BAE  Referring Physician:  SELF, REFERRED  Performed By: Erica Ureña RDCS     BSA: 2.0 m2  Height: 68 in  Weight: 186 lb  BP: 101/54 mmHg  _____________________________________________________________________________  __        Procedure  Limited Portable Echo Adult. Contrast Optison. Optison (NDC #2974-8254-64)  given intravenously. Patient was given 6 ml mixture of 3 ml Optison and 6 ml  saline. 3 ml wasted.  _____________________________________________________________________________  __        Interpretation Summary  The Ejection Fraction is estimated at 20-25%.  Biplane LV EF 23%.  _____________________________________________________________________________  __        Left Ventricle  The Ejection Fraction is estimated at 20-25%. Biplane LV EF 23%.  _____________________________________________________________________________  __                          Report approved by: Crista Welsh 12/20/2017 11:45 AM              _____________________________________________________________________________  __         Specimen Collected: 12/20/17 11:02 AM Last Resulted: 12/20/17 11:02 AM Order Details View Encounter Lab and Collection Details Routing Result History         Linked Documents   View Image                    Final Pathology/Culture Result:     Collected: 12/20/17 1250      Resulting lab: INFECTIOUS DISEASE DIAGNOSTIC LABORATORY     Value: Heavy growth   Pseudomonas aeruginosa    (Abnormal)     Comment:        Order    Bronchial Culture Aerobic Bacterial [[id number]] (Order [id number])         Exam Information      Exam Date Exam Time Accession # Results      12/20/17 12:50 PM         Component Results      Component Collected Lab     Specimen Description 12/20/2017 12:50      Bronchial lavage     Culture Micro (Abnormal) 12/20/2017 12:50      Heavy growth   Pseudomonas aeruginosa        Culture Micro (Abnormal) 12/20/2017 12:50      Light growth   Candida albicans / dubliniensis   Suzette albicans  and Suzette dubliniensis are not routinely speciated   Susceptibility testing not routinely done          Culture & Susceptibility      PSEUDOMONAS AERUGINOSA      Antibiotic Interpretation Sensitivity Unit Method Status     AMIKACIN Sensitive <=2 ug/mL GLENNA Final     CEFEPIME Sensitive 4 ug/mL GLENNA Final     CEFTAZIDIME Sensitive 4 ug/mL GLENNA Final     CIPROFLOXACIN Sensitive <=0.25 ug/mL GLENNA Final     GENTAMICIN Sensitive <=1 ug/mL GLENNA Final     LEVOFLOXACIN Sensitive 1 ug/mL GLENNA Final     MEROPENEM Sensitive <=0.25 ug/mL GLENNA Final     Piperacillin/Tazo Sensitive 8 ug/mL GLENNA Final     TOBRAMYCIN Sensitive <=1 ug/mL GLENNA Final                  All tissue and fluid cultures of the aortic root at the time of surgery on 12/19/17 are negative.       Drains/tubes Present at Discharge   PEG, Right PICC         Medications Prior to Admission:     Prescriptions Prior to Admission   Medication Sig Dispense Refill Last Dose     fiber modular (NUTRISOURCE FIBER) packet 1 packet by Per Feeding Tube route 3 times daily   12/16/2017 at 8 p.m     loperamide (IMODIUM A-D) 2 MG tablet 2 tab by g tube 3 times daily PRN for diarrhea 30 tablet 0      [DISCONTINUED] mirtazapine (REMERON) 7.5 MG TABS tablet GIVE 3 TABLETS(22.5MG) VIA G-TUBE AT BEDTIME 90 tablet 0 12/16/2017 at 8 p.m     [DISCONTINUED] ARIPiprazole (ABILIFY) 2 MG tablet Take 3 tablets (6 mg) by mouth daily 30 tablet 0 12/16/2017 at 8 a.m     [DISCONTINUED] potassium chloride (KLOR-CON) 20 MEQ Packet    12/16/2017 at 4 p.m     [DISCONTINUED] QUEtiapine (SEROQUEL) 25 MG tablet 1 tablet (25 mg) by Oral or Feeding Tube route 4 times daily as needed 60 tablet  12/16/2017 at 8 p.m     [DISCONTINUED] venlafaxine (EFFEXOR) 100 MG tablet Take 1 tablet (100 mg) by mouth 2 times daily 60 tablet 3 12/16/2017 at 4p.m     [DISCONTINUED] ondansetron (ZOFRAN) 4 MG tablet Take 1-2 tablets (4-8mg) every 8 hours as needed for nausea 20 tablet 0      lactobacillus TABS Take 1 tablet by mouth 3  times daily        Menthol-Zinc Oxide 0.44-20.6 % OINT 1 application topically TID PRN for perianal irritation        Miconazole Nitrate 2 % POWD Apply in folds        ketotifen (ZADITOR/REFRESH ANTI-ITCH) 0.025 % SOLN ophthalmic solution Place 1 drop Into the left eye 3 times daily 1 Bottle       White Petrolatum-Mineral Oil (SYSTANE NIGHTTIME) OINT         [DISCONTINUED] amLODIPine (NORVASC) 5 MG tablet Take 1 tablet (5 mg) by mouth daily 180 tablet 3 12/16/2017 at 8 a.m     [DISCONTINUED] hydrochlorothiazide (HYDRODIURIL) 25 MG tablet Take 0.5 tablets (12.5 mg) by mouth daily 90 tablet 1 12/16/2017 at 12 a.m     [DISCONTINUED] labetalol (NORMODYNE) 100 MG tablet Take 1 tablet (100 mg) by mouth every 8 hours   12/16/2017 at 3 p.m     [DISCONTINUED] moxifloxacin (VIGAMOX) 0.5 % ophthalmic solution Place 1 drop Into the left eye 3 times daily 1.25 mL 0      [DISCONTINUED] D5W 1,000 mL with nafcillin 12 g continuous infusion Inject 12 g into the vein every 24 hours   12/16/2017 at 8a.m     [DISCONTINUED] atorvastatin (LIPITOR) 40 MG tablet TAKE 1 TABLET(40 MG) BY MOUTH DAILY 90 tablet 3 12/16/2017 at 8 a.m     [DISCONTINUED] famotidine (PEPCID) 40 MG/5ML suspension Take 2.5 mLs (20 mg) by mouth 2 times daily 75 mL  12/17/2017 at Unknown time     multivitamins with minerals (CERTAVITE/CEROVITE) LIQD liquid 15 mLs by Per Feeding Tube route daily   12/16/2017 at 12 a.m     [DISCONTINUED] acetaminophen (TYLENOL) 32 mg/mL solution 20.3 mLs (650 mg) by Oral or Feeding Tube route every 6 hours as needed for fever or mild pain 400 mL 0 12/16/2017 at 4 p.m     [DISCONTINUED] aspirin 81 MG chewable tablet 1 tablet (81 mg) by Oral or Feeding Tube route daily 36 tablet  12/16/2017 at 4 p.m     [DISCONTINUED] rifampin (REIFADEN) 25 mg/mL SUSP 12 mLs (300 mg) by Oral or Feeding Tube route 2 times daily   12/16/2017 at 8 p.m     [DISCONTINUED] polyethylene glycol (MIRALAX/GLYCOLAX) Packet Take 17 g by mouth daily as needed for  constipation 7 packet            Discharge Medications:        Review of your medicines      START taking       Dose / Directions    bumetanide 1 MG tablet   Commonly known as:  BUMEX   Used for:  Cardiomyopathy, unspecified type (H)        Dose:  1 mg   Take 1 tablet (1 mg) by mouth 2 times daily   Quantity:  60 tablet   Refills:  0       Carboxymethylcellulose Sod PF 0.5 % Soln ophthalmic solution   Commonly known as:  REFRESH PLUS        Dose:  1 drop   Place 1 drop Into the left eye 3 times daily as needed (to flush debris from eye)   Quantity:  1 Bottle   Refills:  1       carvedilol 6.25 MG tablet   Commonly known as:  COREG   Used for:  Hypertension goal BP (blood pressure) < 140/90        Dose:  6.25 mg   Take 1 tablet (6.25 mg) by mouth 2 times daily (with meals)   Quantity:  60 tablet   Refills:  1       famotidine 20 MG tablet   Commonly known as:  PEPCID   Replaces:  famotidine 40 MG/5ML suspension        Dose:  20 mg   Take 1 tablet (20 mg) by mouth 2 times daily   Quantity:  60 tablet   Refills:  1       heparin sodium PF 5000 UNIT/0.5ML injection   Used for:  Acute bacterial endocarditis, Cerebrovascular accident (CVA) due to other mechanism (H)        Dose:  5000 Units   Inject 0.5 mLs (5,000 Units) Subcutaneous every 8 hours   Refills:  0       LORazepam 0.5 MG tablet   Commonly known as:  ATIVAN   Used for:  Anxiety        Dose:  0.5 mg   Take 1 tablet (0.5 mg) by mouth 2 times daily as needed for anxiety   Quantity:  60 tablet   Refills:  0       losartan 25 MG tablet   Commonly known as:  COZAAR   Used for:  Hypertension goal BP (blood pressure) < 140/90        Dose:  25 mg   Take 1 tablet (25 mg) by mouth every 12 hours   Quantity:  60 tablet   Refills:  1       multivitamin, therapeutic with minerals Tabs tablet   Used for:  Cerebrovascular accident (CVA) due to other mechanism (H)   Replaces:  multivitamins with minerals Liqd liquid        Dose:  1 tablet   Start taking on:  1/5/2018   Take 1  tablet by mouth daily   Quantity:  30 each   Refills:  0       nafcillin 2 GM vial   Indication:  Endocarditis        Dose:  2 g   Inject 2 g into the vein every 4 hours for 3 days   Quantity:  240 mL   Refills:  0       OLANZapine 2.5 MG tablet   Commonly known as:  zyPREXA   Used for:  Cerebrovascular accident (CVA) due to other mechanism (H), Anxiety, Delirium due to another medical condition        Dose:  2.5 mg   Take 1 tablet (2.5 mg) by mouth 2 times daily   Quantity:  60 tablet   Refills:  0       ondansetron 4 MG ODT tab   Commonly known as:  ZOFRAN-ODT        Dose:  4 mg   Take 1 tablet (4 mg) by mouth every 6 hours as needed for nausea or vomiting   Quantity:  120 tablet   Refills:  1       oxyCODONE IR 5 MG tablet   Commonly known as:  ROXICODONE   Used for:  Acute post-operative pain        Dose:  5 mg   Take 1 tablet (5 mg) by mouth every 4 hours as needed for moderate to severe pain   Quantity:  40 tablet   Refills:  0       potassium chloride 20 MEQ/15ML (10%) solution   Commonly known as:  KAYCIEL   Used for:  Cardiomyopathy, unspecified type (H)   Replaces:  potassium chloride 20 MEQ Packet        Dose:  20 mEq   Take 15 mLs (20 mEq) by mouth 2 times daily   Quantity:  900 mL   Refills:  0       senna-docusate 8.6-50 MG per tablet   Commonly known as:  SENOKOT-S;PERICOLACE        Dose:  1-2 tablet   Take 1-2 tablets by mouth 2 times daily as needed for constipation   Quantity:  100 tablet   Refills:  0         CONTINUE these medicines which may have CHANGED, or have new prescriptions. If we are uncertain of the size of tablets/capsules you have at home, strength may be listed as something that might have changed.       Dose / Directions    acetaminophen 32 mg/mL solution   Commonly known as:  TYLENOL   This may have changed:    - how to take this  - when to take this  - reasons to take this   Used for:  Acute post-operative pain        Dose:  650 mg   20.3 mLs (650 mg) by Per NG tube route every 4  hours as needed for mild pain   Quantity:  400 mL   Refills:  1       atorvastatin 40 MG tablet   Commonly known as:  LIPITOR   This may have changed:  See the new instructions.   Used for:  Cerebrovascular accident (CVA) due to other mechanism (H)        Dose:  40 mg   Take 1 tablet (40 mg) by mouth daily   Quantity:  60 tablet   Refills:  1       mirtazapine 30 MG tablet   Commonly known as:  REMERON   This may have changed:  See the new instructions.   Used for:  Depression, unspecified depression type        Dose:  30 mg   Take 1 tablet (30 mg) by mouth At Bedtime   Quantity:  30 tablet   Refills:  1         CONTINUE these medicines which have NOT CHANGED       Dose / Directions    aspirin 81 MG chewable tablet   Used for:  Cerebrovascular accident (CVA) due to other mechanism (H)        Dose:  81 mg   1 tablet (81 mg) by Oral or Feeding Tube route daily   Quantity:  60 tablet   Refills:  1       fiber modular packet   Used for:  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)        Dose:  1 packet   1 packet by Per Feeding Tube route 3 times daily   Refills:  0       ketotifen 0.025 % Soln ophthalmic solution   Commonly known as:  ZADITOR/REFRESH ANTI-ITCH        Dose:  1 drop   Place 1 drop Into the left eye 3 times daily   Quantity:  1 Bottle   Refills:  0       lactobacillus Tabs        Dose:  1 tablet   Take 1 tablet by mouth 3 times daily   Refills:  0       loperamide 2 MG tablet   Commonly known as:  IMODIUM A-D        2 tab by g tube 3 times daily PRN for diarrhea   Quantity:  30 tablet   Refills:  0       Menthol-Zinc Oxide 0.44-20.6 % Oint        1 application topically TID PRN for perianal irritation   Refills:  0       Miconazole Nitrate 2 % Powd        Apply in folds   Refills:  0       rifampin 25 mg/mL Susp   Commonly known as:  REIFADEN   Indication:  Abscess   Used for:  Acute bacterial endocarditis        Dose:  300 mg   12 mLs (300 mg) by Oral or Feeding Tube route 2 times  daily for 3 days   Quantity:  120 mL   Refills:  0       SYSTANE NIGHTTIME Oint        Refills:  0         STOP taking          amLODIPine 5 MG tablet   Commonly known as:  NORVASC           ARIPiprazole 2 MG tablet   Commonly known as:  ABILIFY           D5W 1,000 mL with nafcillin 12 g continuous infusion           famotidine 40 MG/5ML suspension   Commonly known as:  PEPCID   Replaced by:  famotidine 20 MG tablet           hydrochlorothiazide 25 MG tablet   Commonly known as:  HYDRODIURIL           labetalol 100 MG tablet   Commonly known as:  NORMODYNE           moxifloxacin 0.5 % ophthalmic solution   Commonly known as:  VIGAMOX           multivitamins with minerals Liqd liquid   Replaced by:  multivitamin, therapeutic with minerals Tabs tablet           ondansetron 4 MG tablet   Commonly known as:  ZOFRAN           polyethylene glycol Packet   Commonly known as:  MIRALAX/GLYCOLAX           potassium chloride 20 MEQ Packet   Commonly known as:  KLOR-CON   Replaced by:  potassium chloride 20 MEQ/15ML (10%) solution           QUEtiapine 25 MG tablet   Commonly known as:  SEROquel           venlafaxine 100 MG tablet   Commonly known as:  EFFEXOR                Where to get your medicines      Some of these will need a paper prescription and others can be bought over the counter. Ask your nurse if you have questions.     Bring a paper prescription for each of these medications      LORazepam 0.5 MG tablet       You don't need a prescription for these medications      acetaminophen 32 mg/mL solution     aspirin 81 MG chewable tablet     atorvastatin 40 MG tablet     bumetanide 1 MG tablet     Carboxymethylcellulose Sod PF 0.5 % Soln ophthalmic solution     carvedilol 6.25 MG tablet     famotidine 20 MG tablet     heparin sodium PF 5000 UNIT/0.5ML injection     losartan 25 MG tablet     mirtazapine 30 MG tablet     multivitamin, therapeutic with minerals Tabs tablet     nafcillin 2 GM vial     OLANZapine 2.5 MG  tablet     ondansetron 4 MG ODT tab     potassium chloride 20 MEQ/15ML (10%) solution     rifampin 25 mg/mL Susp     senna-docusate 8.6-50 MG per tablet         Information about where to get these medications is not yet available     ! Ask your nurse or doctor about these medications      oxyCODONE IR 5 MG tablet                  Discharge Instructions and Follow-Up:   Avoid lifting anything greater than ten pounds for 6 weeks after surgery and then less than 20 pounds for an additional 6 weeks.    No driving for 4 weeks after surgery or while on pain medication. If you had a minimally invasive procedure, you may drive after three weeks if not taking pain medication.      Avoid strenuous activities such as bowling, vacuuming, raking, shoveling, golf or tennis for 12 weeks after your surgery. It is okay to resume sex if you feel comfortable in doing so. You may have to try different positions with your partner.     Splint your chest incision by hugging a pillow or bringing your arms across your chest when coughing or sneezing. Avoid pushing off with your arms when getting up for the first month if you have had your sternum opened.     Shower or wash your incisions daily with soap and water (or as instructed), pat dry. Watch for signs of infection: increased redness, tenderness, warmth or any drainage that appears infected (pus like) or is persistent.  Also a temperature > 100.5 F or chills. Call your surgeon or primary care provider's office immediately. Remove any skin glue left on incisions after 10 days. This will not affect your incision and can speed up healing.    Exercise is very important in your recovery. Please follow the guidelines set up for you in your cardiac rehab classes at the hospital. If outpatient cardiac rehab was ordered for you, we highly recommend you participate. If you have problems arranging your cardiac rehab, please call [phone].     Avoid sitting for prolonged periods of time, try  to walk every hour during the day. If you have a leg incision, elevate your leg often when you are not walking.    Check your weight when you get home from the hospital and continue to check it daily through your recovery for at least a month. If you notice a weight gain of 2-3 pounds in a week, notify your primary care physician, cardiologist or surgeon.    Bowel activity may be slow after surgery. If necessary, you may take an over the counter laxative such as Milk of Magnesia or Miralax. You may have stool softeners prescribed (docusate sodium, Senokot). We recommend using stool softeners while using narcotics for pain (oxycodone/percocet, hydrocodone/vicodin).      DENTAL VISITS AFTER SURGERY  If you have had your heart valve repaired or replaced, we do not recommend having any dental work done for 6 months and you will need to take an antibiotic prior to dental visits from now on.  Please notify your dentist before any procedure for the proper treatment needed. The antibiotic is taken by mouth one hour prior to visit. This includes routine cleanings.    DO NOT SMOKE.  IF YOU NEED HELP QUITTING, PLEASE TALK WITH YOUR CARDIOLOGIST OR PRIMARY DOCTOR.    If you are on a blood thinner, follow the instructions you were given in the hospital and DO NOT SKIP this medication. Try and take it the same time everyday. Your primary care physician or coumadin clinic will manage the dosing.     REGARDING PRESCRIPTION REFILLS.  If you need a refill on your pain medication contact us.  All other medications will be adjusted, discontinued and re-filled by your primary care physician and/or your cardiologist as they were prior to your surgery. We have given you enough for one to three month with possibly one refill.    POST-OPERATIVE CLINIC VISITS  You have a follow up visit with CVTS Surgery Advance Care Practitioners on 1/19/2018 at the [facility].  Patient will then return to the care of your primary  physician and your cardiologist.   It is important to call for an appointment to see your cardiologist in 4-6 weeks after surgery and your primary care physician in 2-4 weeks after surgery. If there is a need to return to see CT Surgery please call our  at [phone].    SURGICAL QUESTIONS  Please call Kristy Rubi with any surgical questions, her phone number is listed below.  She can assist you with your needs and contact other surgery care team members as indicated.    For general questions or concerns, please call the Cardiothoracic Surgery Department at [phone] 8-4:30 M-F.   On weekends or after hours, please call [phone] and ask the  to   page the Cardiothoracic Surgery fellow on call.    Activity       Activity as tolerated with sternal precautions. No lifting more than 10 pounds for first 6 weeks, then no lifting more than 20 pounds for an additional 6 weeks. Avoid lifting arms above shoulders. After these 12 weeks, activity as tolerated with pain. Avoid strenuous activities such as bowling, vacuuming, raking, shoveling, golf or tennis for 12 weeks after your surgery.     No driving for 4 weeks. If you continue to take narcotics after 4 weeks, no driving until you are off narcotics.            Cardiac sternal precautions           Daily weights      Call Provider for weight gain of more than 2 pounds per day or 5 pounds per week.            Diet      Fluid restriction 1500cc FLUID per day        Regular diet with thin liquids with supervision/assist, goal 2 gm sodium cardiac diet       Adult Formula Bolus Feedin cans daily bolused at 10:00 and 16:00 or timing per pt preference, of TwoCal HN; Route: Gastrostomy; 2; Can(s); Medication - Tube Feeding Flush Frequency: At least 15-30 mL water before and after medication administration and with tube clogging.            General info for SNF      Length of Stay Estimate: Short Term Care: Estimated # of Days <30   Condition at  Discharge: Stable   Level of care:skilled   Rehabilitation Potential: Fair   Admission H&P remains valid and up-to-date: Yes   Recent Chemotherapy: N/A   Use Nursing Home Standing Orders: Yes            Intake and output      Every shift            Mantoux instructions      Give two-step Mantoux (PPD) Per Facility Policy Yes            Monitor and record      blood pressure daily   pulse daily   weight every day            Weight bearing status      Requires full lift            Wound care and dressings      You have dissolvable sutures under your skin that do not need to be removed.  Pat wound dressing dry after showers.  Skin glue will eventually fall off in 1-2 weeks.     Keep wound clean and dry, showers are okay after discharge, but don't let spray hit directly on incision. No baths or swimming for 1 month.  Clean wounds twice a day for 2 weeks with microklenz spray if available. Cover chest tube sites with gauze until they stop draining, then leave open.                     Follow-up Appointments      Adult [facility] Follow-up and recommended labs and tests      Follow up with your primary care provider, Dipak Gordillo, within 3-5 days of discharge to evaluate after surgery and for hospital follow- up. The following labs/tests are recommended: CBC, BMP.     Follow-up with Dr. Garzon, your Cardiologist in [place], on January 12, at 12:45pm, labs at 11:45. He will refer you to  [facility].   Follow up with cardiothoracic surgery on Friday January 19th, 2018 at 1:30pm. This follow-up appointment will be listed on your after visit summary paperwork when you leave the hospital. If you have questions, call [phone].     Appointments on [place] and/or [facility] (with [facility] or [facility] provider or service). Call [phone] if you haven't heard regarding these appointments within 7 days of discharge.                       Discharge Disposition:   Discharged to rehab      Condition at discharge:  Chandra Chau PA-C  Cardiothoracic Surgery  Pager [phone]  January 4, 2018         CC:Dipak Brizuela

## 2018-01-03 NOTE — TELEPHONE ENCOUNTER
Phone call received from [facility] CORE RN requesting [place] CORE clinic follow-up for patient currently admitted. Patient has not seen Dr. Garzon for nearly a year. Scheduled office visit with Dr. Garzon who can order CORE clinic based on his assessment. Nu RAE

## 2018-01-03 NOTE — PROGRESS NOTES
CLINICAL NUTRITION SERVICES - REASSESSMENT NOTE     Nutrition Prescription    RECOMMENDATIONS FOR MDs/PROVIDERS TO ORDER:  1. Adjust TFs if needed, pending kcal counts. Once consuming 1430 kcals and 70 g protein daily, then discontinue TFs.   2. Adjust free water flushes if needed, pending fluid status (free water flushes are currently for patency only).     Malnutrition Status:    Non-severe malnutrition in the context of chronic illness    Recommendations already ordered by Registered Dietitian (RD):  Ordered kcal counts 1/4-1/6    Future/Additional Recommendations:  1. Diet as per SLP.   2. Continue to offer oral nutrition supplements.   2. Rec thiamine (100 mg/day) if pt has NYHA Class III-IV heart failure.     3. Continue certavite as ordered to help meet micronutrient needs.  4. Consider checking folic acid and vitamin B12.     EVALUATION OF THE PROGRESS TOWARD GOALS   Diet: DD1 Pureed diet w/ thin liquids. On a 2 L fluid restriction. Advanced from full liquid diet on 12/28. He had been on a regular diet 12/18-12/19. Has oral nutrition supplement order for Gelatein at 14:00, in addition to prn oral supplement order.  Intake: Appetite has been fair per nursing and pt's daughter. Nursing flowsheets indicate pt consuming % of 2 meals 12/29, 75% of 1 meal 12/30, 25-50% of 3 meals 12/31, 25-50% of 2 meals 1/1/18, 75% of 2 meals 1/2.   - Calorie count from 12/29 documents 0 kcals, 0 g protein w/ no supplements and 1 meal ordered. (This does not correspond with the flowsheet per nursing noted above, documenting intake of % of two meals ordered)   - Calorie count from 12/30 documents 745 kcals, 49 g protein from 1 meal and 1 oral nutrition supplement.     Nutrition Support:    - Feeding Tube (FT) access: PEG placed Oct 2017  - TF regimen: Changed TFs to bolus, supplemental to help encourage oral intake. New TF regimen: TwoCal HN, 2 cans daily bolused at 10:00 and 16:00 or timing per pt preference, provides  474 mL TF, 948 kcals, 40 g protein, 332 mL H2O, 104 g CHO, and 2 g fiber daily. Prosource TF modular ordered, 1 pkt TID. Each packet of ProSource TF modular provides 40 kcals and 11 g protein.   - Feeding tube flush: Ordered 60 mL before and after TFs boluses   - Intake:  Spoke with nurse and per chart notes, pt has been receiving full TF's since 12/28, apart from 1 missed feeding today 1/3 at 10 am due to possible discharge plans. Pts I/O flowsheet although does not indicate documentation of bolus feedings on 12/29, 12/31, and 12/1. I do not suspect missed enteral feedings after speaking with the nurse and reviewing notes. Pt received goal Prosource (3 pkts daily) past 5 days except one day (received 2 pkts).     Unable to calculate a five-day average of total nutrition intake due to limited calorie count documentation and inconsistent flowsheet intake information.      NEW FINDINGS   Wt hx: 81.7 kg (12/26/17), 74.5 kg (12/29/17), 74 kg (12/30/17), 74.9 kg (1/2/18) - Pt has lost 11% of his body wt over the past 11 days (21lbs). Has been diuresed this admission and do not suspect true weight loss.     ASSESSED NUTRITION NEEDS (updated 1/3/18):  Dosing Weight: 74 kg (based on lowest wt this admission of 74 kg on 12/30)  Estimated Energy Needs: 0544-2958 kcals/day (25 - 30 kcals/kg)  Justification: Maintenance  Estimated Protein Needs:  grams protein/day (1.2 - 1.5 grams of pro/kg)  Justification: Hypercatabolism with acute illness and Repletion  Estimated Fluid Needs: 2000 mL/day  Justification: On a fluid restriction    MALNUTRITION  % Intake: Not meeting this criteria.     % Weight Loss: Weight loss does not meet criteria. Difficult to assess with diuresis    Subcutaneous Fat Loss: Upper arm: and Lower arm: mild. Pt was busy with nursing cares. Per pt's wife, has noticed generalized muscle loss.  Muscle Loss: Temporal: Moderate. Upper arm (bicep, tricep), Lower arm  (forearm)  and Upper leg (quadricep,  hamstring): Severe. Shoulder and Clavicle: Mild to Moderate   Fluid Accumulation/Edema: Does not meet criteria  Malnutrition Diagnosis: Non-severe malnutrition in the context of chronic illness    Previous Goals   Total average nutrition intake to meet 7272-5593 kcals/day (25 - 30 kcals/kg) and  grams protein/day (1.2 - 1.5 grams of pro/kg).  Evaluation: Not met. Unable to evaluate due to inconsistent I/O documentation in flowsheet.     Previous Nutrition Diagnosis  Inadequate oral intake related to recent diet advancement as evidenced by pt consuming 25% of meals at times.   Evaluation: Resolved. Changed to new nutrition dx below.     CURRENT NUTRITION DIAGNOSIS  Inadequate oral intake related to fair appetite and poor chewing ability 2/2 stroke as evidenced by consuming % of meals BID, with tube feeding meeting ~50% of estimated needs.    INTERVENTIONS  Implementation  Collaboration with other providers: Per discussion with team pt will stay on a pureed diet upon d/c and focus on heart healthy choices.  Ordered kcal counts 1/4 - 1/6.   Nutrition Education: Provided education on Heart Healthy Diet handout and DD1 Pureed foods - ED given earlier today, see other Nutrition Progress Note and See education flowsheet.    Goals  Total average nutrition intake to meet 3646-8918 kcals/day (25 - 30 kcals/kg) and  grams protein/day (1.2 - 1.5 grams of pro/kg).    Monitoring/Evaluation  Progress toward goals will be monitored and evaluated per protocol.   Nutrition will continue to follow.      Florence Brennan, Dietetic Intern    I have read and agree with the above assessment, evaluation, interventions and recommendations.    Reyna Dutton, RD, LD (pgr [phone])

## 2018-01-03 NOTE — PROGRESS NOTES
CVTS Progress Note  Attending provider: Pili Bowers,*        S:  No acute issues over night. Resting comfortably in bed.  Denies any problems today, denies SOB, CP, lightheadedness, dizziness.   +BM, no arrhthymias.     O:   Vitals:    01/02/18 1604 01/02/18 2004 01/03/18 0006 01/03/18 0810   BP: 125/86 112/86 120/69 113/88   BP Location: Left arm Left arm Left arm Left arm   Pulse:       Resp: 16 16 18 18   Temp: 97.5  F (36.4  C) 97.5  F (36.4  C) 97.8  F (36.6  C) 98.4  F (36.9  C)   TempSrc: Axillary Axillary Axillary Oral   SpO2: 94% 98% 95% 97%   Weight:         Vitals:    12/30/17 0400 12/31/17 0500 01/02/18 0230   Weight: 74 kg (163 lb 2.3 oz) 73.9 kg (163 lb) 74.9 kg (165 lb 2 oz)     Intake/Output Summary (Last 24 hours) at 01/03/18 1117  Last data filed at 01/03/18 0907   Gross per 24 hour   Intake             1740 ml   Output                0 ml   Net             1740 ml     Gen: AxOx3, NAD  CV: RRR, no murmurs, rubs, or gallops  Pulm: CTA, no wheezing, no rhonchi  Abd: soft, NT, ND, +BS, +BM  Ext: no LE edema  Incision: c/d/i, no erythema, sternal stable  Tubes/drains: none     Labs:   BMP    Recent Labs  Lab 01/02/18  0701 01/01/18  0819 12/31/17  0705 12/30/17  0703    145* 141 142   POTASSIUM 4.1 4.0 3.5 3.7   CHLORIDE 109 110* 107 108   IZABELLA 9.0 9.4 9.3 9.3   CO2 26 25 25 24   BUN 27 27 28 34*   CR 0.94 0.84 0.86 0.93   * 102* 108* 121*     CBC    Recent Labs  Lab 01/02/18  0701 01/01/18  0819 12/31/17  0705 12/30/17  0703   WBC 11.5* 12.7* 11.9* 12.5*   RBC 3.05* 3.01* 2.85* 2.97*   HGB 9.3* 9.4* 8.8* 9.1*   HCT 31.4* 31.0* 29.6* 30.3*   * 103* 104* 102*   MCH 30.5 31.2 30.9 30.6   MCHC 29.6* 30.3* 29.7* 30.0*   RDW 19.1* 19.0* 18.7* 18.6*   * 495* 458* 492*     INRNo lab results found in last 7 days.   Hepatic Panel   Lab Results   Component Value Date    AST 32 12/18/2017     Lab Results   Component Value Date    ALT 30 12/18/2017     Lab Results   Component  Value Date    BILICONJ 0.0 10/25/2010      Lab Results   Component Value Date    BILITOTAL 1.3 12/18/2017     Lab Results   Component Value Date    ALBUMIN 1.5 12/18/2017     Lab Results   Component Value Date    PROTTOTAL 7.0 12/18/2017      Lab Results   Component Value Date    ALKPHOS 92 12/18/2017         Recent Labs  Lab 01/02/18  0701 01/01/18  0819 12/31/17  0705 12/30/17  0703 12/29/17  2150 12/29/17  1703 12/29/17  1429 12/29/17  0732 12/28/17  2115 12/28/17  1914 12/28/17  1109 12/28/17  0727   * 102* 108* 121*  --   --   --  118*  --   --   --  116*   BGM  --   --   --   --  120* 167* 127*  --  148* 163* 135*  --      Imaging: none     ASSESSMENT: Cricket Miguel is a 62 year old male with hx of COPD, HTN, HLD, CAD, heart failure, aortic stenosis and ascending aortic aneurysm s/p aortic valve replacement and aortic root replacement (April 2016) c/b moises-aortic abscess, endocarditis, cerebral septic emboli and severe AI s/p redo-sternotomy aortic root and valve homograft 12/19. Post op EF 20-25%.        PLAN:   Neuro/ pain/ sedation: Hx of multifocal acute infarctions involving the left parietotemporal lobes, left cerebral hemisphere and right occipital lobe presumed to be septic emboli. Depression, mild delirium, followed by psych.  -Monitor neurological status. Notify the MD for any acute changes in exam.  -Dilaudid PRN.  -Mental status improving slowly   - On psych rec started olanzapine. Agitation improving. Serial EKGs to evaluate QT interval. QTc today 479. Psych recommended decreasing zyprexa given increased flat affect and delayed responses, however much more anxious 12/27 so dose kept at 5 mg BID. Doing better today with both anxiety and affect. Reduced to 2.5 mg BID 1/1.   - Neuro recs fluoxetine for post-stroke recovery, Psych wants to hold off on any changes to psych medications just prior to discharge. They also recommend against fluoxetine while on fluconazole.     Pulmonary care:  extubated 12/20  - On facemask 2 LPM saturation 100%.  - Encourage pulmonary toilet  - Nasotracheal suctioning  - Removed right chest tube 12/26   - Left pleural tube clamping trial okay, removed 12/27  - Vaseline to lips to avoid drying out.       Cardiovascular:    -Hx HTN, HLD, CAD, AS and aortic aneurysm s/p bentall c/b endocarditis and severe AI s/p aortic root/valve homograft 12/19.  -Keep SBP<120  - Cardiology recently started losartan 25 mg po bid. Pressure a little soft and patient reported being a little lightheaded. Gave 100 ml fluid back with improved BP. May need to reduce Losartan dosing if not tolerating but patient appeared to be dry this AM.    - Coreg 6.25 mg bid since 1/1  - ASA 81 mg and atorvastatin.  - Echo shows EF 20-25% baseline 30%   - No bradycardia or pauses, had been in NSR, TPW removed 12/26.        GI care:   - PEG  - famotidine  - senna/colace/miralax, +BM, cdiff negative 12/26       Fluids/ Electrolytes/ Nutrition:   -PRN electrolyte replacement  -Bolus tube feeds through PEG, dajuan counts       Renal/ Fluid Balance:   - Espinoza catheter for strict intake and output.  - Monitor BMP, creatinine  - IV lasix transitioned to Bumex 2 mg PO BID on 12/26, evening dose held on 12/29 due to hypotension and lightheadedness. Stable Bumex 1 mg po bid since 12/30.        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 Hx of MSSA septic arthritis and bacteremia. Hx of multifocal acute infarctions involving the left parietotemporal lobes, left cerebral hemisphere and right occipital lobe presumed to be septic emboli. ID following.   -New cultures BAL growing pseudomonas aeruginosa. ID following. Continue nafcillin/cefepime/rifampin per ID. WBC stable.    - Fluconazole for oral thrush (7 days) 12/30, d/c given risk of qt prolongation      ID rec's from 12/26  - restart PTA nafcillin 2g IV q4h, previously planned duration until 1/7/18  -  continue rifampin 300mg PO BID, previously planned duration until 1/7/18  -continue cefepime for 2 total weeks duration for pseudomonas pneumonia (end date 1/3/18)  -F/u surgical tissue cultures       Heme:   - Acute blood loss anemia  - Hgb stable.        Prophylaxis:    -Mechanical prophylaxis for DVT.   -Heparin TID.       Lines/ tubes/ drains:  -R triple lumen PICC, garza, PIVs       Disposition:  -Floor since 12/23   -Recommending discharge to TCU, however, family would like to take patient home as the facility if there will be a short period of time between going home and going to rehab. Family today not feeling it is safe for patient to go home given it may be a few weeks before patient has placement, which I agree with. Will keep and discharge when placement is confirmed.       Discussed with CVTS Fellow   Staff surgeons have been informed of changes through both  verbal and written communication.      Benny Unger PA-C  Cardiothoracic Surgery  January 3, 2018 11:19 AM   p: [phone]

## 2018-01-03 NOTE — TELEPHONE ENCOUNTER
----- Message from Evert Tyler MD sent at 1/3/2018  1:02 PM CST -----  Dr Gordillo, when Mr Lan was in the hostpital, he was seen by my service for some nasal assymmetry that my team felt was likely secondary to a facial paralysis.    I know that he has other priorities, but if he wants to have this addressed, we have a great facial paralysis rehabilitation team.  Let me know if you'd like us to set up time for this patient.  I have cc'd the coordinator for the service.    Evert Tyler

## 2018-01-04 ENCOUNTER — HOME INFUSION (PRE-WILLOW HOME INFUSION) (OUTPATIENT)
Dept: PHARMACY | Facility: CLINIC | Age: 63
End: 2018-01-04

## 2018-01-04 ENCOUNTER — APPOINTMENT (OUTPATIENT)
Dept: PHYSICAL THERAPY | Facility: CLINIC | Age: 63
DRG: 219 | End: 2018-01-04
Attending: INTERNAL MEDICINE
Payer: COMMERCIAL

## 2018-01-04 ENCOUNTER — APPOINTMENT (OUTPATIENT)
Dept: SPEECH THERAPY | Facility: CLINIC | Age: 63
DRG: 219 | End: 2018-01-04
Attending: INTERNAL MEDICINE
Payer: COMMERCIAL

## 2018-01-04 ENCOUNTER — APPOINTMENT (OUTPATIENT)
Dept: OCCUPATIONAL THERAPY | Facility: CLINIC | Age: 63
DRG: 219 | End: 2018-01-04
Attending: INTERNAL MEDICINE
Payer: COMMERCIAL

## 2018-01-04 VITALS
TEMPERATURE: 98 F | OXYGEN SATURATION: 97 % | HEART RATE: 68 BPM | WEIGHT: 165.12 LBS | RESPIRATION RATE: 18 BRPM | SYSTOLIC BLOOD PRESSURE: 111 MMHG | DIASTOLIC BLOOD PRESSURE: 71 MMHG | BODY MASS INDEX: 25.11 KG/M2

## 2018-01-04 LAB — MAGNESIUM SERPL-MCNC: 2.1 MG/DL (ref 1.6–2.3)

## 2018-01-04 PROCEDURE — 25000132 ZZH RX MED GY IP 250 OP 250 PS 637: Performed by: PHYSICIAN ASSISTANT

## 2018-01-04 PROCEDURE — 25000132 ZZH RX MED GY IP 250 OP 250 PS 637: Performed by: ANESTHESIOLOGY

## 2018-01-04 PROCEDURE — 25000132 ZZH RX MED GY IP 250 OP 250 PS 637: Performed by: SURGERY

## 2018-01-04 PROCEDURE — 40000193 ZZH STATISTIC PT WARD VISIT

## 2018-01-04 PROCEDURE — 40000133 ZZH STATISTIC OT WARD VISIT

## 2018-01-04 PROCEDURE — 97535 SELF CARE MNGMENT TRAINING: CPT | Mod: GO

## 2018-01-04 PROCEDURE — 83735 ASSAY OF MAGNESIUM: CPT | Performed by: SURGERY

## 2018-01-04 PROCEDURE — 97162 PT EVAL MOD COMPLEX 30 MIN: CPT | Mod: GP

## 2018-01-04 PROCEDURE — 97530 THERAPEUTIC ACTIVITIES: CPT | Mod: GP

## 2018-01-04 PROCEDURE — 92526 ORAL FUNCTION THERAPY: CPT | Mod: GN

## 2018-01-04 PROCEDURE — 36592 COLLECT BLOOD FROM PICC: CPT | Performed by: SURGERY

## 2018-01-04 PROCEDURE — 25000125 ZZHC RX 250: Performed by: PHYSICIAN ASSISTANT

## 2018-01-04 PROCEDURE — 40000802 ZZH SITE CHECK

## 2018-01-04 PROCEDURE — 25000128 H RX IP 250 OP 636: Performed by: PHYSICIAN ASSISTANT

## 2018-01-04 PROCEDURE — 25000132 ZZH RX MED GY IP 250 OP 250 PS 637: Performed by: INTERNAL MEDICINE

## 2018-01-04 PROCEDURE — 40000225 ZZH STATISTIC SLP WARD VISIT

## 2018-01-04 RX ORDER — LOSARTAN POTASSIUM 25 MG/1
25 TABLET ORAL EVERY 12 HOURS
Qty: 60 TABLET | Refills: 1 | DISCHARGE
Start: 2018-01-04 | End: 2018-01-01

## 2018-01-04 RX ORDER — ATORVASTATIN CALCIUM 40 MG/1
40 TABLET, FILM COATED ORAL DAILY
Qty: 60 TABLET | Refills: 1 | DISCHARGE
Start: 2018-01-04 | End: 2018-01-01

## 2018-01-04 RX ORDER — FAMOTIDINE 20 MG/1
20 TABLET, FILM COATED ORAL 2 TIMES DAILY
Qty: 60 TABLET | Refills: 1 | DISCHARGE
Start: 2018-01-04 | End: 2018-01-01

## 2018-01-04 RX ORDER — BUMETANIDE 1 MG/1
1 TABLET ORAL
Qty: 60 TABLET | Refills: 0 | DISCHARGE
Start: 2018-01-04 | End: 2018-01-01

## 2018-01-04 RX ORDER — NAFCILLIN SODIUM 2 G/8ML
2 INJECTION, POWDER, FOR SOLUTION INTRAMUSCULAR; INTRAVENOUS EVERY 4 HOURS
Qty: 240 ML | Refills: 0 | DISCHARGE
Start: 2018-01-04 | End: 2018-01-07

## 2018-01-04 RX ORDER — OXYCODONE HYDROCHLORIDE 5 MG/1
5 TABLET ORAL EVERY 4 HOURS PRN
Qty: 40 TABLET | Refills: 0 | Status: SHIPPED | DISCHARGE
Start: 2018-01-04 | End: 2018-01-01

## 2018-01-04 RX ORDER — ONDANSETRON 4 MG/1
4 TABLET, ORALLY DISINTEGRATING ORAL EVERY 6 HOURS PRN
Qty: 120 TABLET | Refills: 1 | DISCHARGE
Start: 2018-01-04 | End: 2018-01-01

## 2018-01-04 RX ORDER — AMOXICILLIN 250 MG
1-2 CAPSULE ORAL 2 TIMES DAILY PRN
Qty: 100 TABLET | Refills: 0 | DISCHARGE
Start: 2018-01-04 | End: 2018-01-01

## 2018-01-04 RX ORDER — MIRTAZAPINE 30 MG/1
30 TABLET, FILM COATED ORAL AT BEDTIME
Qty: 30 TABLET | Refills: 1 | DISCHARGE
Start: 2018-01-04 | End: 2018-01-01

## 2018-01-04 RX ORDER — HEPARIN SODIUM 5000 [USP'U]/.5ML
5000 INJECTION, SOLUTION INTRAVENOUS; SUBCUTANEOUS EVERY 8 HOURS
DISCHARGE
Start: 2018-01-04 | End: 2018-01-09

## 2018-01-04 RX ORDER — MULTIPLE VITAMINS W/ MINERALS TAB 9MG-400MCG
1 TAB ORAL DAILY
Status: DISCONTINUED | OUTPATIENT
Start: 2018-01-05 | End: 2018-01-04 | Stop reason: HOSPADM

## 2018-01-04 RX ORDER — ASPIRIN 81 MG/1
81 TABLET, CHEWABLE ORAL DAILY
Qty: 60 TABLET | Refills: 1 | DISCHARGE
Start: 2018-01-04

## 2018-01-04 RX ORDER — CARBOXYMETHYLCELLULOSE SODIUM 5 MG/ML
1 SOLUTION/ DROPS OPHTHALMIC 3 TIMES DAILY PRN
Qty: 1 BOTTLE | Refills: 1 | DISCHARGE
Start: 2018-01-04

## 2018-01-04 RX ORDER — POTASSIUM CHLORIDE 20MEQ/15ML
20 LIQUID (ML) ORAL 2 TIMES DAILY
Qty: 900 ML | Refills: 0 | DISCHARGE
Start: 2018-01-04 | End: 2018-01-01

## 2018-01-04 RX ORDER — MULTIPLE VITAMINS W/ MINERALS TAB 9MG-400MCG
1 TAB ORAL DAILY
Qty: 30 EACH | Refills: 0 | DISCHARGE
Start: 2018-01-05

## 2018-01-04 RX ORDER — CARVEDILOL 6.25 MG/1
6.25 TABLET ORAL 2 TIMES DAILY WITH MEALS
Qty: 60 TABLET | Refills: 1 | DISCHARGE
Start: 2018-01-04 | End: 2018-01-23

## 2018-01-04 RX ORDER — OXYCODONE HYDROCHLORIDE 5 MG/1
5 TABLET ORAL EVERY 4 HOURS PRN
Qty: 30 TABLET | Refills: 0 | Status: SHIPPED | DISCHARGE
Start: 2018-01-04 | End: 2018-01-04

## 2018-01-04 RX ORDER — OLANZAPINE 2.5 MG/1
2.5 TABLET, FILM COATED ORAL 2 TIMES DAILY
Qty: 60 TABLET | Refills: 0 | DISCHARGE
Start: 2018-01-04 | End: 2018-01-12

## 2018-01-04 RX ADMIN — NAFCILLIN SODIUM 2 G: 2 INJECTION, POWDER, LYOPHILIZED, FOR SOLUTION INTRAMUSCULAR; INTRAVENOUS at 00:03

## 2018-01-04 RX ADMIN — BUMETANIDE 1 MG: 1 TABLET ORAL at 09:46

## 2018-01-04 RX ADMIN — NAFCILLIN SODIUM 2 G: 2 INJECTION, POWDER, LYOPHILIZED, FOR SOLUTION INTRAMUSCULAR; INTRAVENOUS at 04:12

## 2018-01-04 RX ADMIN — CARVEDILOL 6.25 MG: 6.25 TABLET, FILM COATED ORAL at 09:46

## 2018-01-04 RX ADMIN — NAFCILLIN SODIUM 2 G: 2 INJECTION, POWDER, LYOPHILIZED, FOR SOLUTION INTRAMUSCULAR; INTRAVENOUS at 12:33

## 2018-01-04 RX ADMIN — Medication: at 09:48

## 2018-01-04 RX ADMIN — FAMOTIDINE 20 MG: 20 TABLET ORAL at 09:45

## 2018-01-04 RX ADMIN — OLANZAPINE 2.5 MG: 2.5 TABLET, FILM COATED ORAL at 09:47

## 2018-01-04 RX ADMIN — HEPARIN SODIUM 5000 UNITS: 5000 INJECTION, SOLUTION INTRAVENOUS; SUBCUTANEOUS at 16:40

## 2018-01-04 RX ADMIN — Medication 1 PACKET: at 09:47

## 2018-01-04 RX ADMIN — Medication: at 14:34

## 2018-01-04 RX ADMIN — MULTIVITAMIN 15 ML: LIQUID ORAL at 09:42

## 2018-01-04 RX ADMIN — NAFCILLIN SODIUM 2 G: 2 INJECTION, POWDER, LYOPHILIZED, FOR SOLUTION INTRAMUSCULAR; INTRAVENOUS at 09:46

## 2018-01-04 RX ADMIN — ASPIRIN 81 MG CHEWABLE TABLET 81 MG: 81 TABLET CHEWABLE at 09:43

## 2018-01-04 RX ADMIN — Medication 1 PACKET: at 14:33

## 2018-01-04 RX ADMIN — LOSARTAN POTASSIUM 25 MG: 25 TABLET ORAL at 09:44

## 2018-01-04 RX ADMIN — Medication 300 MG: at 09:43

## 2018-01-04 RX ADMIN — KETOTIFEN FUMARATE 1 DROP: 0.25 SOLUTION/ DROPS OPHTHALMIC at 09:46

## 2018-01-04 RX ADMIN — KETOTIFEN FUMARATE 1 DROP: 0.25 SOLUTION/ DROPS OPHTHALMIC at 14:34

## 2018-01-04 RX ADMIN — NAFCILLIN SODIUM 2 G: 2 INJECTION, POWDER, LYOPHILIZED, FOR SOLUTION INTRAMUSCULAR; INTRAVENOUS at 16:33

## 2018-01-04 RX ADMIN — ATORVASTATIN CALCIUM 40 MG: 40 TABLET, FILM COATED ORAL at 09:45

## 2018-01-04 RX ADMIN — POTASSIUM CHLORIDE 20 MEQ: 20 SOLUTION ORAL at 09:45

## 2018-01-04 NOTE — PLAN OF CARE
Problem: Patient Care Overview  Goal: Plan of Care/Patient Progress Review    Discharge Planner SLP   Patient plan for discharge: TCU  Current status: Recommending regular diet and thin liquids. Pt should be alert, upright, have 1:1 assist. Safe swallow strategies (written in room): chew on R (strong side), place straw in R corner of his mouth, encourage slow and small sips/bites.   Barriers to return to prior living situation: weakness, cognition  Recommendations for discharge: ongoing SLP at TCU for dysphagia/speech-language tx  Rationale for recommendations: Pt with residual dysarthria/aphasia and cognitive impairments from recent stroke and below his baseline. Pt would benefit from ongoing SLP to maximize safety and independent functioning in order to reduce caregiver burden and improve overall QOL. Dysphagia tx indicated to maximize independence with safe swallow strategies and provide education to staff at [facility] d/c environment of safe swallow precautions       Entered by: Daniela Lehman 01/04/2018 4:18 PM

## 2018-01-04 NOTE — PLAN OF CARE
Problem: Patient Care Overview  Goal: Plan of Care/Patient Progress Review  Discharge Planner OT   Patient plan for discharge: home   Current status: Pt alert and following 50% of single-step commands. Pt with deficits in STM and unable to recall breakfast eaten prior to session. Facilitated increased independence with functional transfers and sitting balance for ADLs. Pt min A for log rolling to L and max A for rolling to R. Pt dependent for LB dressing. Pt dependently lifted to EOB and chair. Pt dangled EOB with max A progressing to min A with multiple LOB in all direction. Pt with improved ability to self-correct LOB however needs max verbal/tactile cues.   Barriers to return to prior living situation: residual R sided weakness, post surgical precautions, dependent for ADLs and mobility   Recommendations for discharge: TCU   Rationale for recommendations: Highly recommend TCU as pt with good rehab potential as pt presents well below functional baseline since recent surgery. Pt with recent CVA with residual R sided weakness and max A for ADLs and mobility.        Entered by: Alka Romero 01/04/2018 9:20 AM

## 2018-01-04 NOTE — CONSULTS
"Giovanni a 62 year old  presenting with AVR with complications that followed the procedure.  CORE consult requested.     Giovanni and Meghna were provided with a CHF book.  I reviewed the importance of daily weights, 2 gm sodium diet, 2L fluid restriction and compliance with medications upon hospital discharge. Pt will be going to [facility] upon discharge from hospital. RN reinforced importance of the above, especially when pt returns home. CORE contact phone numbers provided and patient is encouraged to call with any questions or concerns, including any weight gain or loss of 2 or more pounds in 24 hours or 5 or more pounds in 1 week.      Appointment made with Dr. Garzon (cardiologist in [place]) for follow up on 18. He will then decide when it is appropriate for [facility] [place] to see the patient. Thank you for the consult.    Ivette Gonzalez RN BSN   Cardiology Care Coordinator - [facility]  Questions and [phone]  First press #1 for the 9GAG and then press #3 for \"Medical Advise\" to reach us Cardiology Nurses.      "

## 2018-01-04 NOTE — PLAN OF CARE
Problem: Patient Care Overview  Goal: Plan of Care/Patient Progress Review  Patient plan for discharge: Home.  Current status: PT evaluation completed, treatment initiated. Pt alert during session, follows ~50% of single-step commands, occasional moments of agitation. Pt requires ModA for log rolling to L, and MaxA for log rolling to R. Pt dependently transferred to sitting EOB and chair via ceiling lift + sling, Ax2 for safety. Pt requires MaxA for static sitting balance EOB, pt progresses to [place] however experiences multiple LOB in all directions and requires MaxA + vc/tc to maintain upright centered posture. Pt engaged in seated LE ROM/strengthening exercises in chair, requires max vc/tc for completion of exercises.  Barriers to return to prior living situation: Residual R sided weakness, post surgical precautions, dependent for mobility.  Recommendations for discharge: TCU.  Rationale for recommendations: Recommend TCU at this time. Pt has good rehab potential and currently presents well below previous functional baseline. Pt with recent CVA with residual R sided weakness and requires increased assist for mobility.

## 2018-01-04 NOTE — TELEPHONE ENCOUNTER
Patient is still currently admitted to the hospital.  Per chart review it looks like they are looking for short term rehab placement for patient.  Please call when patient has been discharged.    RANJEET Crow, RN, N  [facility]) [phone]

## 2018-01-04 NOTE — PLAN OF CARE
Problem: Patient Care Overview  Goal: Plan of Care/Patient Progress Review  Continues on Maxipime IV @ 8 hours and Nafcillin Q 4 hours via TLC PICC. Monitor shows NSR 80's-90's. Incontinent urine and stool, moises area skin intact but reddens very easily from stool contact, used Sween, Inzo with Phyllis, S.O. Caregiver requesting we use only Sween due to difficulty cleaning off Inzo. Small raised area noted on buttocks, pencil eraser sized, skin intact, CVTS examined, likely infected hair follicle, warm compress applied. Speech advanced diet to Regular diet/Thins OK, patient fed by Nursing Staff. Patient continues to have watery, incontinent stools soon after receiving ordered TF bolus via G Tube. S.O. would like TF DC'ed, will confer with Dietician/CVTS tomorrow in that regards. Possible transfer to Rehab or home, with Services, tomorrow.

## 2018-01-04 NOTE — PROGRESS NOTES
1/4/2018   CVTS Progress Note  Attending provider: Pili Bowers,*        S:  No acute issues over night. Patient denies  SOB, CP, fever, chills, sweats. Patient tolerating diet. Full assist, occupational therapy seeing daily.  + BM. No arrhythmias. Spent 45 minutes with patient and significant other Meghna today discussing discharge plan and that our goal is to be able to safely discharge patient. Therapies recommending TCU. Per last not by Neurology, patient should continue to improve with aggressive rehab. Meghna and patient upset because they just want to leave hospital today. She has concerns that patient is just laying around here and not getting any therapy. Per hospital chart, patient has been seen by occupational therapy daily except for New Years day. Meghna states she can take patient home and await acceptance into a rehab center and that she was taking care of him 24/7 at home prior to the surgery with home care. She reports that home care came daily except for Sunday's for 2 hours a day. I explained to patient that we were having an issue with home care and the IV infusions and this was currently a barrier to their discharge to home today. Patient became very agitated at this point in the discussion and Meghna and I both decided to continue our discussion about placement after more information obtained from KARLENE. I spoke with Angelina from  and she is actively pursuing rehab placement which may become available today and will update patient and family. Family has agreed to rehab placement if suitable option becomes available.        O:   Vitals:    01/03/18 1809 01/04/18 0001 01/04/18 0827 01/04/18 1000   BP: (!) 120/98 122/79 108/83    BP Location:   Left arm    Pulse:    68   Resp: 18 18 18    Temp: 98.4  F (36.9  C) 97.7  F (36.5  C) 99.2  F (37.3  C)    TempSrc: Oral Oral Oral    SpO2: 98% 93% 94%    Weight:         Vitals:    12/30/17 0400 12/31/17 0500 01/02/18 0230   Weight: 74 kg (163 lb 2.3 oz)  73.9 kg (163 lb) 74.9 kg (165 lb 2 oz)       Intake/Output Summary (Last 24 hours) at 01/04/18 1105  Last data filed at 01/04/18 0400   Gross per 24 hour   Intake              480 ml   Output              100 ml   Net              380 ml       Gen: AxOx3, NAD  CV: RRR, no murmurs, rubs, or gallops  Pulm: CTA, no wheezing, no rhonchi  Abd: soft, NT, ND, +BS, +BM  Ext: no LE edema  Incision: c/d/i, no erythema, sternal stable  Tubes/drains: none   Labs:   BMP  Recent Labs  Lab 01/02/18  0701 01/01/18  0819 12/31/17  0705 12/30/17  0703    145* 141 142   POTASSIUM 4.1 4.0 3.5 3.7   CHLORIDE 109 110* 107 108   IZABELLA 9.0 9.4 9.3 9.3   CO2 26 25 25 24   BUN 27 27 28 34*   CR 0.94 0.84 0.86 0.93   * 102* 108* 121*     CBC  Recent Labs  Lab 01/02/18  0701 01/01/18  0819 12/31/17  0705 12/30/17  0703   WBC 11.5* 12.7* 11.9* 12.5*   RBC 3.05* 3.01* 2.85* 2.97*   HGB 9.3* 9.4* 8.8* 9.1*   HCT 31.4* 31.0* 29.6* 30.3*   * 103* 104* 102*   MCH 30.5 31.2 30.9 30.6   MCHC 29.6* 30.3* 29.7* 30.0*   RDW 19.1* 19.0* 18.7* 18.6*   * 495* 458* 492*     INRNo lab results found in last 7 days.   Hepatic Panel   Lab Results   Component Value Date    AST 32 12/18/2017     Lab Results   Component Value Date    ALT 30 12/18/2017     Lab Results   Component Value Date    BILICONJ 0.0 10/25/2010      Lab Results   Component Value Date    BILITOTAL 1.3 12/18/2017     Lab Results   Component Value Date    ALBUMIN 1.5 12/18/2017     Lab Results   Component Value Date    PROTTOTAL 7.0 12/18/2017      Lab Results   Component Value Date    ALKPHOS 92 12/18/2017         Recent Labs  Lab 01/02/18  0701 01/01/18  0819 12/31/17  0705 12/30/17  0703 12/29/17  2150 12/29/17  1703 12/29/17  1429 12/29/17  0732 12/28/17  2115 12/28/17  1914 12/28/17  1109   * 102* 108* 121*  --   --   --  118*  --   --   --    BGM  --   --   --   --  120* 167* 127*  --  148* 163* 135*         Imaging: none today  ASSESSMENT: Cricket Miguel  is a 62 year old male with hx of COPD, HTN, HLD, CAD, heart failure, aortic stenosis and ascending aortic aneurysm s/p aortic valve replacement and aortic root replacement (April 2016) c/b moises-aortic abscess, endocarditis, cerebral septic emboli and severe AI s/p redo-sternotomy aortic root and valve homograft 12/19. Post op EF 20-25%.      PLAN:   Neuro/ pain/ sedation: Hx of multifocal acute infarctions involving the left parietotemporal lobes, left cerebral hemisphere and right occipital lobe presumed to be septic emboli. History of depression and anxiety. Had some delirium post operatively. Patient was followed by both psychiatry and neurology post operatively. His delirium has resolved and is mental status has significantly improved. He is alert and oriented x 3. His daughter Eloy is his medical decision maker as psychiatry does not believe he has the capacity at this time to be his own decision maker. His partner Meghna is very involed in his care and is his caregiver at home. He has been stable on his current regimen of zyprexa 2.5 mg bid, it was decreased 1/1/18 given increased flat affect and delayed responses. This has since resolved and he is doing better with both anxiety and affect. He is also on mirtazapine at night for sleep and PRN Lorazepam for anxiety. Neurology recommended fluoxetine for post-stroke recovery, however, Psych wanted to hold off on any changes to psych medications just prior to discharge. They also recommend against fluoxetine or Effexor at this time due to patient's long Qtc interval, 479 ms on 12/27. Patient did take Effexor prior to surgery for his depression. Should his depression become an issue psychiatry recommends starting Lexapro, as it has very few drug-drug interactions. Per neurology note on 12/29/17, they believe he should have continued functional improvement to some degree.      Pulmonary care: extubated 12/20  - Removed right chest tube 12/26   - Left pleural tube   removed 12/27  - Not requiring suctioning anymore  - On room air with saturations 97%  - Continue incentive spirometry and pulmonary toilet.         Cardiovascular:    -Hx HTN, HLD, CAD, AS and aortic aneurysm s/p bentall c/b endocarditis and severe AI s/p aortic root/valve homograft 12/19.  - Post operative echo showing EF of 20%, baseline 30%, heart failure has been following patient for medical management.    -Keep SBP<120  - Tolerating current medications well: Losartan 25 mg daily, Coreg 6.25 mg bid since 1/1, ASA 81 mg and atorvastatin.  - No bradycardia or pauses, had been in NSR, TPW removed 12/26.   - patient will follow up with Dr. Garzon his cardiologist on 1/12/18 in [place].   - He will follow up with CV surgery for post op visit 1/19/18 at 1:30pm.        GI care:   - PEG  - famotidine  - some loose stools but no frequent diarrhea, +BM, cdiff negative 12/26       Fluids/ Electrolytes/ Nutrition:   -PRN electrolyte replacement  -Bolus tube feeds through PEG bid, calorie counts to be performed by Nutrition. Some concerns by patient's partner that tube feeding causing liquid stools. Will not change regimen this close to discharge as Nutrition believes loose stools more likely caused by patient's multivitamin. I did changed multivitamin per Nutrition today.   - Laxatives also changed to PRN instead of scheduled  - Advanced to regular diet with thins 1/4 per speech.  Pt should be alert, upright, have 1:1 assist. Safe swallow strategies (written in room): chew on R (strong side), place straw in R corner of his mouth, encourage slow and small sips/bites.  - patient uses bed pan for stooling.       Renal/ Fluid Balance:   - patient is incontinent of urine  - Monitor BMP, creatinine  - Stable Bumex 1 mg po bid since 12/30 with supplemental potassium.        Endocrine:   - blood sugars WNL, is not diabetic. No insulin required.        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 Hx of MSSA septic arthritis and bacteremia. Hx of multifocal acute infarctions involving the left parietotemporal lobes, left cerebral hemisphere and right occipital lobe presumed to be septic emboli. ID following.   - Bronchial lavage cultures gerw pseudomonas aeruginosa on 12/20/17. Infectious disease team followed patient throughout hospital stay. He completed cefepime for 2 total weeks duration for pseudomonas pneumonia (end date 1/3/18) WBC stable 11.5. He is afebrile on RA.   - Fluconazole for oral thrush (7 days) 12/30, d/c given risk of qt prolongation on 1/3      ID rec's from 12/26  - restart PTA nafcillin 2g IV q4h, previously planned duration until 1/7/18  - continue rifampin 300mg PO BID, previously planned duration until 1/7/18  - Surgical tissue cultures 12/19- they have remained negative after 2 weeks. No further follow up needed with Infectious Diease per them.        Heme:   - Acute blood loss anemia  - Hgb stable at 9.3.  - No evidence of bleeding         Prophylaxis:    -Mechanical prophylaxis for DVT.   -Heparin subq TID for DVT prophylaxis while at rehab facility.       Lines/ tubes/ drains:  -R triple lumen PICC for antibiotic infusion. May remove after antibiotics stopped.        Disposition:  - Patient will discharge to [facility] today 1/4/18 at 1630 with approval of family.       Discussed with CVTS Fellow   Staff surgeons have been informed of changes through both  verbal and written communication.       Florence Chau PA-C  Cardiothoracic Surgery   Pager [phone]  January 4, 2018

## 2018-01-04 NOTE — CONSULTS
"PSYCHIATRIC CONSULTATION:        DATE OF SERVICE:  01/03/2018.        REQUESTING SERVICE AND REASON FOR ADMISSION:  The Surgical Service requested a followup consultation regarding the patient's decisional capacity to make the informed decision to go home.      HISTORY OF PRESENT ILLNESS:  Cricket Miguel is a 62-year-old gentleman who has been previously seen by Dr. Thakkar and Dr. Babin on our service who had suffered a CVA and recently had aortic valve replacement.  The patient currently is pending discharge and has a number of rehabilitation needs, including speech therapy, physical therapy and things of that sort.  There are some issues about placement.  Apparently the family had been looking for a TCU or rehabilitation center.  They have not available find one that they feel comfortable with yet and had then made the decision to take the patient home.  The patient's daughter, Meghna, is his proxy decision maker.  There are questions about the safety of bringing him home with his multiple rehab needs.  I did meet with his daughter.  She was unclear as to whether they would be able to find a TCU for him.  She stated that the patient's girlfriend was out looking for one, visiting various TCUs.  She is concerned that the family would have to pay if he was in the hospital and did not meet criteria.  She stated she felt the family was \"capable of caring for him for a few days.\"      On my interview, the patient was quite dysarthric.  He had trouble finding words and was confused.  In review of the chart he has been quite delirious recently.  I was really unable to carry on any type of meaningful conversation with him.  He did state that he wanted to go home, but I was unable to have any decision about the risks, benefits or his rehabilitation needs with him due to his neurocognitive deficits and dysarthria.      The case was discussed with the physician's assistant from the Cardiothoracic Surgery Service also with Hudson " "KAMAR Calabrese, the  who has been working on the case.  There are concerns about the safety of the plan for the patient to go home and there are a number of unclear issues about placement and finances and things of that sort and the family is somewhat confused and upset by all of this.      REVIEW OF SYSTEMS:  This was very difficult to perform due to the patient's dysarthria.  He did have right-sided weakness and denied any other specific medical complaints.      VITAL SIGNS:  Temperature 98.4, respirations 18, pulse 95, blood pressure 113/88.      MENTAL STATUS EXAMINATION:     General appearance and behavior:  The patient is lying in bed.  He has a marked facial droop.  He is alert.  He is quite dysarthric in terms of his speech.  It is very difficult to carry on a conversation with him.  He appeared at times to understand what I was asking him but other times he appeared more confused.  There was a long speech latency.  At one point he said \"I forgot what we were talking about.\"     His mood was slightly irritable.  His affect was appropriate.     His speech was slow and there were long pauses at times.  His language is quite dysarthric.  His attention and concentration was difficult to sort out.  It was hard to tell if he was really responding to what I asked him.  His overall ability to describe recent events was very limited.    It was difficult to assess his fund of knowledge.     His judgment and insight appears impaired. I was really unable to assess with any accuracy due to his confusion and dysarthria.  Muscle bulk and tone:  He had a right-sided weakness.     Abnormal movements:  None noted.      IMPRESSION:  The patient is a 62-year-old gentleman status post stroke and cardiac surgery.  He has been quite delirious recently.  He currently is confused, is markedly dysarthric and has extreme difficulty communicating due to word finding and confusion and dysarthria.  On the basis of my " communication with him, I cannot state that he has decisional capacity at this time.  I believe he should have a substitute decision maker.  From my understanding this is his daughter, Meghna.      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      TREATMENT RECOMMENDATIONS:   1.  Would have his daughter Meghna act as his decision maker due to his lack of capacity.   2.  Would make sure that the plan the family has is safe and realistic for the patient.  Would consider a PM&R consultation.  It is important that the patient has a safe appropriate placement for his multiple rehab needs.   3.  The case was discussed with the cardiothoracic surgery service and social work   4.  Please call or reconsult with any questions, concerns or change in the patient's status.  My number is [phone].         SHENA KOHLI MD             D: 2018 16:31   T: 2018 19:01   MT:       Name:     ASHTYN STROUD   MRN:      [id number]        Account:       [id number]   :      1955           Consult Date:  2018      Document: [id number]

## 2018-01-04 NOTE — PROGRESS NOTES
This is a recent snapshot of the patient's [facility] medical record.  For current drug dose and complete information and questions, call [phone] or In [facility] pool, fv home infusion ([id number])  CSN Number:  [id number]

## 2018-01-04 NOTE — PROGRESS NOTES
Paged per nursing for 16 beats of vtach at 2:45 patient was asymptomatic at the time. Sinus rhythm now    - No interventions    Eva Root,PGY-1

## 2018-01-04 NOTE — PROGRESS NOTES
CLINICAL NUTRITION SERVICES     Nutrition Prescription    Future/Additional Recommendations:  For all recommendations, see prior nutrition notes.   Once consuming 1430 kcals and 70 g protein daily, then discontinue TFs. Rec continue TFs at this time.      Request from social work to see pt/family.     Per pt's significant other (Meghna), believes TFs are causing pt's loose stools and states she wants pt's TFs stopped. Meghna mentioned that pt's stools have been loose the entire admission.     INTERVENTIONS:  Implementation:  Nutrition education: Benefits of TFs were discussed in addition to previous kcal count data. Discussed recommendation that an increase in nutrition intake is needed before TFs can safely be discontinued by pt's provider. Also, discussed possible etiologies of loose stools. During visit today, Meghna expressed that she would like pt's TFs stopped. Assured Meghna that this information would be relayed to pt's team; however, TFs were still being recommended this morning during discussion with pt's team.   Collaboration with other providers: Discussed pt with team this morning and afternoon. Plan is to continue TFs. Team did discontinue certavite and ordered a thera-vit-M as the osmolality may cause loose stools.     Follow up/Monitoring:  Will continue to follow pt.    Claire Kwong, MS, RD, LD, CNSC   6C Pgr:  [phone]

## 2018-01-04 NOTE — PROVIDER NOTIFICATION
Notified by monitor tech that pt had 16 beats of VT at 0249. Pt sleeping when checked upon. CVTS cross covered notified via text page.   Will continue to monitor

## 2018-01-04 NOTE — PLAN OF CARE
Problem: Patient Care Overview  Goal: Plan of Care/Patient Progress Review    D: S/P redo aortic root and aortic valve replacement 12/19. Hx of COPD, HTN, HLD, CAD, AVR and AAA 4/16 c/b endocarditis and cerebral septic emboli.      I/A: Vital signs stable, afebrile, A&Ox4, sinus rhythm. Denied pain overnight. Turned/repostioned Q2H. Incontinent of urine, no BM overnight. Meds crushed down G tube. Bolus feeding during the day (wife requesting TF be d/c'd d/t diarrhea). IV antibiotics given as scheduled. Incisions CDI. Slept well between cares. Chronic R sided weakness and left facial droop. Wife at bedside and supportive.      P: Possible d/c home today with home care then TCU when bed available. Continue to monitor and notify MD with pertinent changes.

## 2018-01-04 NOTE — PROGRESS NOTES
Social Work Services Progress Note    Hospital Day: 18  Date of Initial Social Work Evaluation:  12/26/17  Collaborated with: Meghna (SO), Eloy (daughter), CVTS team, SNF admissions: [facility], [facility], Psychiatry    Data:  Pt is a 62 year old male being followed by SW for placement to rehab.  SW has been getting denials for pt due to pt's cognition, medical needs and rehab potential.  New referrals were started to [facility] and [facility]    Intervention:  SW met with pt's daughter Eloy in person.  Eloy today is very upset that the pt is being discharged to home and cannot get into [facility].  Elyo stating that the information she received from Meghna was different from the information that KARLENE had relayed to Meghna.  SW explained [facility] denial again to Eloy.  SW stated that if the pt is wanting to get rehab the family will have to choose another location.  Eloy is willing to make alternate referrals in the area, Meghna has strong opinions about many facilities not being a fit for the pt.  SO Meghna toured [facility] and states to writer that admissions agreed to accept the pt for Saturday.  Meghan also stating the pt can discharge to home today and [facility] will take him in Saturday.  SW stated they wanted to verify this with the facility as this is not a usual discharge plan.  KARLENE made several calls to [facility] asking if this was plan was placed with family.  Writer received a call and voicemail that yes this was the plan, pending a second review by the facility.  Admissions team also stating in message that if the pt did discharge to home, he would have to get new orders from a PCP prior to admitting.  To writer this appears to be incredibly difficult for family as the next bed open at [facility] is on Saturday and it would be difficult to get PCP orders on a Saturday.  In the voicemail SW also did not get a guarantee that the Saturday opening bed is being held for  this family.  SW left messages with admissions to confirm that 1) the bed is being held and 2) the facility has reviewed and accepted the pt for admission.    SW requested a capacity screening by the psychiatry team as the pt is adamant about going home and there appears to be strong family dynamics between VERONICA Coffman and daughter Eloy.  Eloy understands that by  policy she is NOK if the pt is not having capacity.  Psychiatry met with pt and family and has placed a consult note regarding pt's ability to participate in discharge planning.      If the pt is not fully accepted by [facility] as of tomorrow morning, will recommend to the family that another facility is chosen for referral as the bed at [facility] is not guaranteed.    Assessment:  Pt awake and able to participate in conversations.  Pt very frustrated he is not going home today.    Plan:    Anticipated Disposition:  Facility:  TCU - spouse toured more facilities and states that [facility] is her choice.     Barriers to d/c plan: Bed availability at facilities, SO has very limited choices for referrals.    Follow Up: KARLENE and RNCC to follow for discharge planning today.    ANTON Church, APSW  6C Unit   Phone: [phone]  Pager: [phone]  Unit: [phone]

## 2018-01-04 NOTE — PROGRESS NOTES
Social Work Services Discharge Note      Patient Name:  Cricket Miguel     Anticipated Discharge Date: 01/04/18 @ 1630    Discharge Disposition:   Facility: [facility]  PH: [phone]  F: [phone]  Nurse to Nurse Call: PH: [phone]    Following MD: Dipak Gordillo  [address] / [facility] [place] [address]     Pre-Admission Screening (PAS) online form has been completed.  The Level of Care (LOC) is:  Determined  Confirmation Code is:  [id number]  Patient/caregiver informed of referral to [facility] for Pre-Admission Screening for skilled nursing facility (SNF) placement and to expect a phone call post discharge from SNF.     Additional Services/Equipment Arranged:    - Transportation: [employer] (PH: [phone]) wheelchair ride scheduled for 1630.  Pt and family are aware and in agreement that insurance may not cover wheelchair ride: NA     Patient / Family response to discharge plan:  Pt and family in agreement with the plan.     Persons notified of above discharge plan:  Pt, Family (Daughter VERONICA Lyons), bedside RN, CVTS team, PCP, SNF admissions.    Staff Discharge Instructions:  Please fax discharge orders and signed hard scripts for any controlled substances.  Please print a packet and send with patient.     CTS Handoff completed:  YES    Medicare Notice of Rights provided to the patient/family:  ANTON Rivera APSW  6C Unit   Phone: [phone]  Pager: [phone]  Unit: [phone]      ANTON Church APSW  6C Unit   Phone: [phone]  Pager: [phone]  Unit: [phone]

## 2018-01-04 NOTE — PLAN OF CARE
Problem: Patient Care Overview  Goal: Plan of Care/Patient Progress Review  Physical Therapy Discharge Summary    Reason for therapy discharge:    Discharged to transitional care facility.    Progress towards therapy goal(s). See goals on Care Plan in [facility] electronic health record for goal details.  Goals not met.  Barriers to achieving goals:   discharge from facility.    Therapy recommendation(s):    Continued therapy is recommended.  Rationale/Recommendations:  to progress functional mobility.

## 2018-01-04 NOTE — PROGRESS NOTES
" 01/04/18 1400   Quick Adds   Type of Visit Initial PT Evaluation   Living Environment   Lives With significant other   Living Arrangements house  (all needs met on main level)   Home Accessibility no concerns   Number of Stairs to Enter Home 0   Transportation Available family or friend will provide;other (see comments)  (medical transportation)   Living Environment Comment Per OT tammy, \"Pt lives with SO who has been serving as his full time caregiver since his strokes in Oct '17 (pt initially discharge to [facility] and then home); does have a HHA to assist with bathing\".   Self-Care   Dominant Hand left   Usual Activity Tolerance moderate   Current Activity Tolerance poor   Regular Exercise yes   Activity/Exercise Type other (see comments)  (Home PT/OT/SLP)   Exercise Amount/Frequency 5-7 times/wk   Equipment Currently Used at Home hospital bed;lift device;wheelchair, manual;commode;other (see comments)  (kt [place])   Activity/Exercise/Self-Care Comment Per OT tammy, \"Pt SO reports pt is shelia dependent for transfers; in therapy pt was working on unsupported EOB sitting and had advanced to ~30-45 second bouts at a time.\"   Functional Level Prior   Ambulation 4-->completely dependent   Transferring 3-->assistive equipment and person   Toileting 3-->assistive equipment and person   Bathing 2-->assistive person   Dressing 2-->assistive person   Eating 2-->assistive person   Communication 2-->difficulty speaking (not related to language barrier)   Swallowing 2-->difficulty swallowing liquids   Cognition 1 - attention or memory deficits   Fall history within last six months no   Which of the above functional risks had a recent onset or change? transferring   Prior Functional Level Comment Per OT tammy, \"Prior to illness this year, pt was (I) with all ADL/IADLs; since returning home most recently from hospital pt was dependent with functional transfers and needed assist with toileting, bathing, dressing, meals, and " "meds; pt is able to feed self and perform oral/facial cares however does sometimes request assist due to difficulty completing tasks.\"   General Information   Onset of Illness/Injury or Date of Surgery - Date 12/16/17   Referring Physician Rea Boyce MD    Patient/Family Goals Statement Pt states he wants to return home.   Pertinent History of Current Problem (include personal factors and/or comorbidities that impact the POC) Cricket Miguel is a 62 year old male with hx of COPD, HTN, HLD, CAD, heart failure, aortic stenosis and ascending aortic aneurysm s/p aortic valve replacement and aortic root replacement (April 2016) c/b moises-aortic abscess, endocarditis, cerebral septic emboli and severe AI s/p redo-sternotomy aortic root and valve homograft 12/19.   Precautions/Limitations fall precautions;sternal precautions   Heart Disease Risk Factors Age;Medical history;Lack of physical activity   General Observations Upon arrival, pt supine in bed and agreeable to therapy session.   General Info Comments Activity - OOB to chair   Cognitive Status Examination   Orientation person   Level of Consciousness alert;agitated   Follows Commands and Answers Questions 50% of the time;able to follow single-step instructions   Personal Safety and Judgment impaired   Memory impaired   Pain Assessment   Patient Currently in Pain No   Posture    Posture Forward head position;Protracted shoulders;Kyphosis   Range of Motion (ROM)   ROM Comment R sided hemiparesis, L UE/LE ROM WFL. ROM not formally tested 2/2 pt agitation.   Strength   Strength Comments R sided hemiparesis, L UE/LE strength at least 3+/5. Strength not formally tested 2/2 pt agitation.   Bed Mobility   Bed Mobility Comments Pt requires ModA for log rolling to L, and MaxA for log rolling to R.   Transfer Skills   Transfer Comments Pt dependently transferred to sitting EOB and chair via ceiling lift + sling, Ax2 for safety.    Balance   Balance Comments Pt " "requires MaxA for static sitting balance EOB, pt progresses to [place] however experiences multiple LOB in all directions and requires MaxA + vc/tc to maintain upright centered posture.    Sensory Examination   Sensory Perception no deficits were identified   General Therapy Interventions   Planned Therapy Interventions balance training;bed mobility training;strengthening;ROM;transfer training;home program guidelines;progressive activity/exercise;neuromuscular re-education   Clinical Impression   Criteria for Skilled Therapeutic Intervention yes, treatment indicated   PT Diagnosis Impaired functional mobility   Influenced by the following impairments LE ROM/strength deficits, balance impairments, limited activity tolerance, cognition.   Functional limitations due to impairments Bed mobility, transfers.   Clinical Presentation Evolving/Changing   Clinical Presentation Rationale PMHx, clinical judgement.   Clinical Decision Making (Complexity) Moderate complexity   Therapy Frequency` daily   Predicted Duration of Therapy Intervention (days/wks) 2 weeks   Anticipated Equipment Needs at Discharge (TBD with further therapy)   Anticipated Discharge Disposition Transitional Care Facility   Risk & Benefits of therapy have been explained Yes   Patient, Family & other staff in agreement with plan of care Yes   [facility] [employer] \"6 Clicks\"   2016, Trustees of [facility], under license to [employer].  All rights reserved.   6 Clicks Short Forms Basic Mobility Inpatient Short Form   [facility] ED01[facility]  \"6 Clicks\" V.2 Basic Mobility Inpatient Short Form   1. Turning from your back to your side while in a flat bed without using bedrails? 2 - A Lot   2. Moving from lying on your back to sitting on the side of a flat bed without using bedrails? 1 - Total   3. Moving to and from a bed to a chair (including a wheelchair)? 1 - Total   4. Standing up from a chair using your arms (e.g., wheelchair, or bedside " chair)? 1 - Total   5. To walk in hospital room? 1 - Total   6. Climbing 3-5 steps with a railing? 1 - Total   Basic Mobility Raw Score (Score out of 24.Lower scores equate to lower levels of function) 7   Total Evaluation Time   Total Evaluation Time (Minutes) 8

## 2018-01-05 ENCOUNTER — CARE COORDINATION (OUTPATIENT)
Dept: CARE COORDINATION | Facility: CLINIC | Age: 63
End: 2018-01-05

## 2018-01-05 ENCOUNTER — PRE VISIT (OUTPATIENT)
Dept: CARDIOLOGY | Facility: CLINIC | Age: 63
End: 2018-01-05

## 2018-01-05 NOTE — PROGRESS NOTES
This is a recent snapshot of the patient's [facility] medical record.  For current drug dose and complete information and questions, call [phone] or In Basket pool, fv home infusion ([id number])  CSN Number:  [id number]

## 2018-01-05 NOTE — PLAN OF CARE
Problem: Patient Care Overview  Goal: Plan of Care/Patient Progress Review    Speech Language Therapy Discharge Summary    Reason for therapy discharge:    Discharged to transitional care facility.    Progress towards therapy goal(s). See goals on Care Plan in [facility] electronic health record for goal details.  Goals met    Therapy recommendation(s):    Continued therapy is recommended.  Rationale/Recommendations:  see below. Pt with residual dysarthria/aphasia and cognitive impairments from recent stroke and below his baseline. Pt would benefit from ongoing SLP to maximize safety and independent functioning in order to reduce caregiver burden and improve overall QOL. Dysphagia tx indicated to maximize independence with safe swallow strategies and provide education to staff at [facility] d/c environment of safe swallow precautions

## 2018-01-05 NOTE — PLAN OF CARE
Problem: Patient Care Overview  Goal: Plan of Care/Patient Progress Review  Occupational Therapy Discharge Summary    Reason for therapy discharge:    Discharged to transitional care facility.    Progress towards therapy goal(s). See goals on Care Plan in [facility] electronic health record for goal details.  Goals partially met.  Barriers to achieving goals:   discharge from facility.    Therapy recommendation(s):    Continued therapy is recommended.  Rationale/Recommendations:  Highly recommend TCU as pt with good rehab potential as pt presents well below functional baseline since recent surgery. Pt with recent CVA with residual R sided weakness and max A for ADLs and mobility. .

## 2018-01-05 NOTE — TELEPHONE ENCOUNTER
Do you want us to wait until TCU discharge to call about this or hold off for now?    Merary Kiran, BS, RN, PHN  [facility]) [phone]

## 2018-01-05 NOTE — TELEPHONE ENCOUNTER
Please review with his daughter Eloy MELANIE, have her let us know if and when they would like to proceed

## 2018-01-05 NOTE — TELEPHONE ENCOUNTER
Attempt #1  Called Eloy MELANIE, @ [phone] - Left a non-detailed message to call back and speak with any triage nurse.    Joycelyn Jones RN  [place] [facility]

## 2018-01-05 NOTE — PROGRESS NOTES
Patient has clinic visit within 24-48 hours of Discharge so no post DC follow up call is needed    1/5/2018 Status: Scheduled      Time: 1:30 PM   Length: 30       Visit Type: NURSING HOME [1529]   Copay: $0.00     Provider: Marianna Oconnor APRN CNP Department: [facility]ANS [facility]     Encounter #: [id number] Notes: HOSP FOLLOW UP     Joycelyn James CMA Post Discharge Team

## 2018-01-05 NOTE — PROGRESS NOTES
Social Work Services Progress Note    Hospital Day: 19  Date of Initial Social Work Evaluation:  12/26/17  Collaborated with: Meghna (SO), Eloy (daughter), CVTS team, SNF admissions: [facility], [facility]    Data:  Pt is a 62 year old male being followed by SW for placement to rehab.  SW has been getting denials for pt due to pt's cognition, medical needs and rehab potential.  Pt accepted to [facility] for today.    Intervention:  SW made several calls to pt's daughter Eloy to update her on the discharge planning process.  Eloy continuing to voice frustration with the discharge process and is hopeful that the pt can be serviced at [facility] and get rehab before going home.  Plan is for discharge today by wheelchair at 4:30.    Assessment:  Pt awake and able to participate in conversations.  Pt very frustrated he is not going home today.  Eloy and Meghna voicing frustration with the discharge planning process.    Plan:    Anticipated Disposition:  Facility:  [facility]     Barriers to d/c plan: None, discharge today.    Follow Up: SW to discharge to TCU.    ANTON Church, PACOW  [address]C Unit   Phone: [phone]  Pager: [phone]  Unit: [phone]

## 2018-01-08 ENCOUNTER — NURSING HOME VISIT (OUTPATIENT)
Dept: GERIATRICS | Facility: CLINIC | Age: 63
End: 2018-01-08
Payer: COMMERCIAL

## 2018-01-08 ENCOUNTER — CARE COORDINATION (OUTPATIENT)
Dept: CARE COORDINATION | Facility: CLINIC | Age: 63
End: 2018-01-08

## 2018-01-08 VITALS
BODY MASS INDEX: 26.3 KG/M2 | HEIGHT: 68 IN | TEMPERATURE: 98 F | RESPIRATION RATE: 16 BRPM | SYSTOLIC BLOOD PRESSURE: 135 MMHG | WEIGHT: 173.5 LBS | OXYGEN SATURATION: 96 % | HEART RATE: 86 BPM | DIASTOLIC BLOOD PRESSURE: 92 MMHG

## 2018-01-08 DIAGNOSIS — I63.9 CEREBROVASCULAR ACCIDENT (CVA), UNSPECIFIED MECHANISM (H): ICD-10-CM

## 2018-01-08 DIAGNOSIS — F17.200 TOBACCO USE DISORDER: ICD-10-CM

## 2018-01-08 DIAGNOSIS — I39 ENDOCARDITIS AND HEART VALVE DISORDERS IN DISEASES CLASSIFIED ELSEWHERE: Primary | ICD-10-CM

## 2018-01-08 DIAGNOSIS — H02.105 ECTROPION OF LEFT LOWER EYELID, UNSPECIFIED ECTROPION TYPE: ICD-10-CM

## 2018-01-08 DIAGNOSIS — H01.005 BLEPHARITIS OF LEFT LOWER EYELID, UNSPECIFIED TYPE: ICD-10-CM

## 2018-01-08 DIAGNOSIS — I50.9 CONGESTIVE HEART FAILURE, NYHA CLASS 2, UNSPECIFIED CONGESTIVE HEART FAILURE TYPE (H): ICD-10-CM

## 2018-01-08 DIAGNOSIS — J44.9 COPD, MILD (H): ICD-10-CM

## 2018-01-08 DIAGNOSIS — I10 HYPERTENSION GOAL BP (BLOOD PRESSURE) < 140/90: ICD-10-CM

## 2018-01-08 DIAGNOSIS — I77.810 AORTIC ROOT DILATATION (H): ICD-10-CM

## 2018-01-08 DIAGNOSIS — F32.1 MAJOR DEPRESSIVE DISORDER, SINGLE EPISODE, MODERATE (H): ICD-10-CM

## 2018-01-08 DIAGNOSIS — Z93.1 GASTROSTOMY TUBE IN PLACE (H): ICD-10-CM

## 2018-01-08 DIAGNOSIS — F41.9 ANXIETY: ICD-10-CM

## 2018-01-08 DIAGNOSIS — R13.12 OROPHARYNGEAL DYSPHAGIA: ICD-10-CM

## 2018-01-08 PROCEDURE — 99310 SBSQ NF CARE HIGH MDM 45: CPT | Performed by: NURSE PRACTITIONER

## 2018-01-08 NOTE — PROGRESS NOTES
Clinic Care Coordination Contact  Care Coordination Transition Communication    Referral Source: CTS    Clinical Data: Patient was hospitalized at [facility] from 12/16 to 01/04 with diagnosis of CHF.     Transition to Facility:              Facility Name: [facility]              Contact name and phone number/fax: letter sent via comm mgmt    Plan: RN/SW Care Coordinator will await notification from facility staff informing RN/SW Care Coordinator of patient's discharge plans/needs. RN/SW Care Coordinator will review chart and outreach to facility staff every 4 weeks and as needed.

## 2018-01-08 NOTE — LETTER
1/8/2018        RE: Cricket Miguel  [id number]   [facility] [id number]        [facility]  PRIMARY CARE PROVIDER AND CLINIC:  Dipak Gordillo [address] [facility] / [place] [address]  Chief Complaint   Patient presents with     Hospital F/U       HPI:    Cricket Miguel is a 62 year old  (1955),admitted to the [facility] from [facility].  Hospital stay 12/16/2017 through 01/04/2018.  Admitted to this facility for  rehab, medical management and nursing care.  HPI information obtained from: facility chart records.  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     Came with PICC line to complete course of IV antibiotics.  PICC line to be removed when ABX complete and if K+ WNL    I personally removed PICC without difficulty.  Pressure held on site 5 min.  tip intact  Aortic root dilatation (H))    See above  Cerebrovascular accident (CVA), unspecified mechanism (H)  Oropharyngeal dysphagia  Gastrostomy tube in place (H)    He is working with ST to regain speaking and swallowing.    He is no on a regular low sodium diet - chewing and swallowing without difficulty.  Family is feeding him  Congestive heart failure, NYHA class 2, unspecified congestive heart failure type (H)  Hypertension goal BP (blood pressure) < 140/90    Fluid volume improving    Followed by [facility]  COPD, mild (H)  Tobacco use disorder    No longer smoking  Major depressive disorder, single episode, moderate (H)  Anxiety     Post CVA is on Olanzapine and Mirtazapine.  Also on Lorazepam    Followed by both Psych and Neuro    He did get quite anxious when his wife/girlfriend left the room.    Blepharitis of  "left lower eye lid  Ectropion of left lower eye lid    Family has been putting artificial tears and gel to keep eye moist.       Hospital Course:   \"Cricket Miguel is a 62 year old male with hx of COPD, HTN, HLD, CAD, heart failure, aortic stenosis and ascending aortic aneurysm s/p aortic valve replacement and aortic root replacement (April 2016) c/b moises-aortic abscess, endocarditis, cerebral septic emboli and severe AI s/p redo-sternotomy aortic root and valve homograft 12/19. Post op EF 20-25%.        PLAN:   Neuro/ pain/ sedation: Hx of multifocal acute infarctions involving the left parietotemporal lobes, left cerebral hemisphere and right occipital lobe presumed to be septic emboli. History of depression and anxiety. Had some delirium post operatively. Patient was followed by both psychiatry and neurology post operatively. His delirium has resolved and is mental status has significantly improved. He is alert and oriented x 3. His daughter Eloy is his medical decision maker as psychiatry does not believe he has the capacity at this time to be his own decision maker. His partner Meghna is very involed in his care and is his caregiver at home. He has been stable on his current regimen of zyprexa 2.5 mg bid, it was decreased 1/1/18 given increased flat affect and delayed responses. This has since resolved and he is doing better with both anxiety and affect. He is also on mirtazapine at night for sleep and PRN Lorazepam for anxiety. Neurology recommended fluoxetine for post-stroke recovery, however, Psych wanted to hold off on any changes to psych medications just prior to discharge. They also recommend against fluoxetine or Effexor at this time due to patient's long Qtc interval, 479 ms on 12/27. Patient did take Effexor prior to surgery for his depression. Should his depression become an issue psychiatry recommends starting Lexapro, as it has very few drug-drug interactions. Per neurology note on 12/29/17, they " believe he should have continued functional improvement to some degree.      Pulmonary care: extubated 12/20  - Removed right chest tube 12/26   - Left pleural tube  removed 12/27  - Not requiring suctioning anymore  - On room air with saturations 97%  - Continue incentive spirometry and pulmonary toilet.         Cardiovascular:    -Hx HTN, HLD, CAD, AS and aortic aneurysm s/p bentall c/b endocarditis and severe AI s/p aortic root/valve homograft 12/19.  - Post operative echo showing EF of 20%, baseline 30%, heart failure has been following patient for medical management.    -Keep SBP<120  - Tolerating current medications well: Losartan 25 mg daily, Coreg 6.25 mg bid since 1/1, ASA 81 mg and atorvastatin.  - No bradycardia or pauses, had been in NSR, TPW removed 12/26.   - patient will follow up with Dr. Garzon his cardiologist on 1/12/18 in [place].   - He will follow up with CV surgery for post op visit 1/19/18 at 1:30pm.        GI care:   - PEG  - famotidine  - some loose stools but no frequent diarrhea, +BM, cdiff negative 12/26       Fluids/ Electrolytes/ Nutrition:   -PRN electrolyte replacement  -Bolus tube feeds through PEG bid, calorie counts to be performed by Nutrition. Some concerns by patient's partner that tube feeding causing liquid stools. Will not change regimen this close to discharge as Nutrition believes loose stools more likely caused by patient's multivitamin. I did changed multivitamin per Nutrition today.   - Laxatives also changed to PRN instead of scheduled  - Advanced to regular diet with thins 1/4 per speech.  Pt should be alert, upright, have 1:1 assist. Safe swallow strategies (written in room): chew on R (strong side), place straw in R corner of his mouth, encourage slow and small sips/bites.  - patient uses bed pan for stooling.       Renal/ Fluid Balance:   - patient is incontinent of urine  - Monitor BMP, creatinine  - Stable Bumex 1 mg po bid since 12/30 with supplemental  "potassium.        Endocrine:   - blood sugars WNL, is not diabetic. No insulin required.        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 Hx of MSSA septic arthritis and bacteremia. Hx of multifocal acute infarctions involving the left parietotemporal lobes, left cerebral hemisphere and right occipital lobe presumed to be septic emboli. ID following.   - Bronchial lavage cultures gerw pseudomonas aeruginosa on 12/20/17. Infectious disease team followed patient throughout hospital stay. He completed cefepime for 2 total weeks duration for pseudomonas pneumonia (end date 1/3/18) WBC stable 11.5. He is afebrile on RA.   - Fluconazole for oral thrush (7 days) 12/30, d/c given risk of qt prolongation on 1/3      ID rec's from 12/26  - restart PTA nafcillin 2g IV q4h, previously planned duration until 1/7/18  - continue rifampin 300mg PO BID, previously planned duration until 1/7/18  - Surgical tissue cultures 12/19- they have remained negative after 2 weeks. No further follow up needed with Infectious [facility] per them.        Heme:   - Acute blood loss anemia  - Hgb stable at 9.3.  - No evidence of bleeding         Prophylaxis:    -Mechanical prophylaxis for DVT.   -Heparin subq TID for DVT prophylaxis while at rehab facility.       Lines/ tubes/ drains:  -R triple lumen PICC for antibiotic infusion. May remove after antibiotics stopped.        Disposition:  - Patient will discharge to [facility] today 1/4/18 at 1630 with approval of family.\"     CODE STATUS/ADVANCE DIRECTIVES DISCUSSION:   CPR/Full code   Patient's living condition: lives with spouse    ALLERGIES:Lisinopril  PAST MEDICAL HISTORY:  has a past medical history of Abscess of aortic root (09/2017); AI (aortic insufficiency); Anxiety; Aortic root dilatation (H); AR (aortic regurgitation); Cardiomyopathy (H); CHF (congestive heart failure), NYHA class II (H); COPD, mild (H); CVA (cerebral vascular accident) (H) " (09/2017); Depression (09/2017); Heart murmur; Hyperlipidemia LDL goal <70 (10/31/2010); Hypertension goal BP (blood pressure) < 140/90; Inguinal hernia; left lung nodule  (1/29/2008); Major depressive disorder, single episode, moderate (H); Psoriasis (childhood); and Tobacco use disorder. He also has no past medical history of Difficulty in walking(719.7); History of blood transfusion; or Malignant hyperthermia.  PAST SURGICAL HISTORY:  has a past surgical history that includes SHOULDER ARTHROSCOPY, DX (2001); SHOULDER ARTHROSCOPY, DX (1/04); hernia repair, umbilical (1/08); rotator cuff repair rt/lt (11/10); Herniorrhaphy inguinal (12/21/2012); surgical history of - (5/16); Replace valve aortic (N/A, 5/17/2016); Arthroscopy knee incision and drainage (Left, 10/8/2017); Tracheostomy percutaneous (10/27/2017); and Repair aneurysm ascending aorta (N/A, 12/19/2017).  FAMILY HISTORY: family history includes Alzheimer Disease in his father; CANCER in his mother; Genetic Disorder in his mother.  SOCIAL HISTORY:  reports that he quit smoking about 21 months ago. He has a 35.00 pack-year smoking history. He has never used smokeless tobacco. He reports that he drinks alcohol. He reports that he uses illicit drugs.    Post Discharge Medication Reconciliation Status: discharge medications reconciled, continue medications without change.  Current Outpatient Prescriptions   Medication Sig Dispense Refill     acetaminophen (TYLENOL) 32 mg/mL solution 20.3 mLs (650 mg) by Per NG tube route every 4 hours as needed for mild pain 400 mL 1     aspirin 81 MG chewable tablet 1 tablet (81 mg) by Oral or Feeding Tube route daily 60 tablet 1     mirtazapine (REMERON) 30 MG tablet Take 1 tablet (30 mg) by mouth At Bedtime 30 tablet 1     ondansetron (ZOFRAN-ODT) 4 MG ODT tab Take 1 tablet (4 mg) by mouth every 6 hours as needed for nausea or vomiting 120 tablet 1     atorvastatin (LIPITOR) 40 MG tablet Take 1 tablet (40 mg) by mouth daily  60 tablet 1     losartan (COZAAR) 25 MG tablet Take 1 tablet (25 mg) by mouth every 12 hours 60 tablet 1     OLANZapine (ZYPREXA) 2.5 MG tablet Take 1 tablet (2.5 mg) by mouth 2 times daily 60 tablet 0     carvedilol (COREG) 6.25 MG tablet Take 1 tablet (6.25 mg) by mouth 2 times daily (with meals) 60 tablet 1     bumetanide (BUMEX) 1 MG tablet Take 1 tablet (1 mg) by mouth 2 times daily 60 tablet 0     senna-docusate (SENOKOT-S;PERICOLACE) 8.6-50 MG per tablet Take 1-2 tablets by mouth 2 times daily as needed for constipation 100 tablet 0     potassium chloride (KAYCIEL) 20 MEQ/15ML (10%) solution Take 15 mLs (20 mEq) by mouth 2 times daily 900 mL 0     multivitamin, therapeutic with minerals (THERA-VIT-M) TABS tablet Take 1 tablet by mouth daily 30 each 0     Carboxymethylcellulose Sod PF (REFRESH PLUS) 0.5 % SOLN ophthalmic solution Place 1 drop Into the left eye 3 times daily as needed (to flush debris from eye) 1 Bottle 1     famotidine (PEPCID) 20 MG tablet Take 1 tablet (20 mg) by mouth 2 times daily 60 tablet 1     Heparin Sodium, Porcine, (HEPARIN SODIUM PF) 5000 UNIT/0.5ML injection Inject 0.5 mLs (5,000 Units) Subcutaneous every 8 hours       oxyCODONE IR (ROXICODONE) 5 MG tablet Take 1 tablet (5 mg) by mouth every 4 hours as needed for moderate to severe pain 40 tablet 0     LORazepam (ATIVAN) 0.5 MG tablet Take 1 tablet (0.5 mg) by mouth 2 times daily as needed for anxiety 60 tablet 0     loperamide (IMODIUM A-D) 2 MG tablet 2 tab by g tube 3 times daily PRN for diarrhea 30 tablet 0     lactobacillus TABS Take 1 tablet by mouth 3 times daily       Menthol-Zinc Oxide 0.44-20.6 % OINT 1 application topically TID PRN for perianal irritation       Miconazole Nitrate 2 % POWD Apply in folds       ketotifen (ZADITOR/REFRESH ANTI-ITCH) 0.025 % SOLN ophthalmic solution Place 1 drop Into the left eye 3 times daily 1 Bottle      fiber modular (NUTRISOURCE FIBER) packet 1 packet by Per Feeding Tube route 3 times  "daily         ROS:  10 point ROS of systems including Constitutional, Eyes, Respiratory, Cardiovascular, Gastroenterology, Genitourinary, Integumentary, Muscularskeletal, Psychiatric were all negative except for pertinent positives noted in my HPI.    Exam:  BP (!) 135/92  Pulse 86  Temp 98  F (36.7  C)  Resp 16  Ht 5' 8\" (1.727 m)  Wt 173 lb 8 oz (78.7 kg)  SpO2 96%  BMI 26.38 kg/m2  GENERAL APPEARANCE:  Alert, anxious  ENT:  Mouth and posterior oropharynx normal, moist mucous membranes, [place]  EYES:  conjunctival erythema left, crusting, mattering present left, ectropion left lower lid  NECK:  No adenopathy,masses or thyromegaly, no adenopathy  RESP:  respiratory effort and palpation of chest normal, lungs clear to auscultation , no respiratory distress  CV:  Palpation and auscultation of heart done , regular rate and rhythm, no murmur, rub, or gallop, peripheral edema 2+ in lower extremities  ABDOMEN:  normal bowel sounds, soft, nontender, no hepatosplenomegaly or other masses, gastrostomy tube in place, stoma skin intact, no leakage or excoriation  M/S:   Gait and station abnormal right hemiparesis.  Mechanical lift, total assist with mobility and needs support to sit up  Digits and nails abnormal hypertrophic  SKIN:  Inspection of skin and subcutaneous tissue baseline, Palpation of skin and subcutaneous tissue baseline, sternal incision CDI, scabbed over.  NEURO:   Visual field cut, slurred speech, left sided facial droop, left hemiparesis  PSYCH:  insight and judgement impaired, memory impaired , anxious     BP 01/04-01/07: 118-155/61-97 mmHg    Lab/Diagnostic data:    CBC RESULTS:   Recent Labs   Lab Test  01/02/18   0701  01/01/18   0819   WBC  11.5*  12.7*   RBC  3.05*  3.01*   HGB  9.3*  9.4*   HCT  31.4*  31.0*   MCV  103*  103*   MCH  30.5  31.2   MCHC  29.6*  30.3*   RDW  19.1*  19.0*   PLT  497*  495*       Last Basic Metabolic Panel:  Recent Labs   Lab Test  01/02/18   0701  01/01/18   0819   NA  " 141  145*   POTASSIUM  4.1  4.0   CHLORIDE  109  110*   IZABELLA  9.0  9.4   CO2  26  25   BUN  27  27   CR  0.94  0.84   GLC  113*  102*       Liver Function Studies -   Recent Labs   Lab Test  12/18/17   0359  12/17/17 2024   PROTTOTAL  7.0  7.0   ALBUMIN  1.5*  1.6*   BILITOTAL  1.3  1.0   ALKPHOS  92  94   AST  32  29   ALT  30  32       Lab Results   Component Value Date    A1C 5.3 12/19/2017    A1C 6.4 10/07/2017       ASSESSMENT/PLAN:  (I39) 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   Plan: completed course of ABX.  Follow CBC and BMP  Cleanse chest daily and closely monitor for S/SX infection  If diarrhea not improved, will need stool culture to rule out C Diff.  Barrier cream moises-area.       (I63.9) Cerebrovascular accident (CVA), unspecified mechanism (H)  (R13.12) Oropharyngeal dysphagia  (Z93.1) Gastrostomy tube in place (H)  Comment: beginning recovery  Plan: ST working with swallowing and speaking and memory  OT cognitive and upper body mobility for ADL training  Continue daily ASA, Lipitor, Heparin injections q 8 hours during his rehab stay    (I50.9) Congestive heart failure, NYHA class 2, unspecified congestive heart failure type (H)  (I10) Hypertension goal BP (blood pressure) < 140/90  Comment: followed by [facility] clinic  Plan: daily weights, will coordinate diuresis with [facility] NP if weight changes.  Continue Coreg, Bumex, Losartan.  Weekly BMP    (J44.9) COPD, mild (H)  (F17.200) Tobacco use disorder  Comment: no longer smoking  Plan:  monitor respiratory status    (F32.1) Major depressive disorder, single episode, moderate (HCC)  (F41.9) Anxiety  Comment: life changing event  Plan: continue on Olanzapine,  Mirtazapine, scheduled for now.  Will continue to monitor behavior and attempt to wean off Olanzapine if able.  Mirtazapine dosage reduction is not indicated at this time.  Lorazepam BID PRN is needed at this time due to his uncertainty of environment 2/2 recent stroke.     (H01.005) Blepharitis of left lower eyelid, unspecified type  (H02.105) Ectropion of left lower eyelid, unspecified ectropion type  Comment: acute, developed about 1 week ago  Plan: Warm wet compress to left eye QID, QID artificial tears and artificial tear ointment at HS  If drainage persists may need antibiotic    Electronically signed by:  BIBI Bowden CNP                    Sincerely,        BIBI Bowden CNP

## 2018-01-08 NOTE — PROGRESS NOTES
[facility]  PRIMARY CARE PROVIDER AND CLINIC:  Dipak Gordillo [address] [facility] [address]  Chief Complaint   Patient presents with     Hospital F/U       HPI:    Cricket Miguel is a 62 year old  (1955),admitted to the [facility] from [facility].  Hospital stay 12/16/2017 through 01/04/2018.  Admitted to this facility for  rehab, medical management and nursing care.  HPI information obtained from: facility chart records.  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     Came with PICC line to complete course of IV antibiotics.  PICC line to be removed when ABX complete and if K+ WNL    I personally removed PICC without difficulty.  Pressure held on site 5 min.  tip intact  Aortic root dilatation (H))    See above  Cerebrovascular accident (CVA), unspecified mechanism (H)  Oropharyngeal dysphagia  Gastrostomy tube in place (H)    He is working with ST to regain speaking and swallowing.    He is no on a regular low sodium diet - chewing and swallowing without difficulty.  Family is feeding him  Congestive heart failure, NYHA class 2, unspecified congestive heart failure type (H)  Hypertension goal BP (blood pressure) < 140/90    Fluid volume improving    Followed by [facility]  COPD, mild (H)  Tobacco use disorder    No longer smoking  Major depressive disorder, single episode, moderate (H)  Anxiety     Post CVA is on Olanzapine and Mirtazapine.  Also on Lorazepam    Followed by both Psych and Neuro    He did get quite anxious when his wife/girlfriend left the room.    Blepharitis of left lower eye lid  Ectropion of left lower eye lid    Family has been putting artificial tears  "and gel to keep eye moist.       Hospital Course:   \"Cricket Miguel is a 62 year old male with hx of COPD, HTN, HLD, CAD, heart failure, aortic stenosis and ascending aortic aneurysm s/p aortic valve replacement and aortic root replacement (April 2016) c/b moises-aortic abscess, endocarditis, cerebral septic emboli and severe AI s/p redo-sternotomy aortic root and valve homograft 12/19. Post op EF 20-25%.        PLAN:   Neuro/ pain/ sedation: Hx of multifocal acute infarctions involving the left parietotemporal lobes, left cerebral hemisphere and right occipital lobe presumed to be septic emboli. History of depression and anxiety. Had some delirium post operatively. Patient was followed by both psychiatry and neurology post operatively. His delirium has resolved and is mental status has significantly improved. He is alert and oriented x 3. His daughter Eloy is his medical decision maker as psychiatry does not believe he has the capacity at this time to be his own decision maker. His partner Meghna is very involed in his care and is his caregiver at home. He has been stable on his current regimen of zyprexa 2.5 mg bid, it was decreased 1/1/18 given increased flat affect and delayed responses. This has since resolved and he is doing better with both anxiety and affect. He is also on mirtazapine at night for sleep and PRN Lorazepam for anxiety. Neurology recommended fluoxetine for post-stroke recovery, however, Psych wanted to hold off on any changes to psych medications just prior to discharge. They also recommend against fluoxetine or Effexor at this time due to patient's long Qtc interval, 479 ms on 12/27. Patient did take Effexor prior to surgery for his depression. Should his depression become an issue psychiatry recommends starting Lexapro, as it has very few drug-drug interactions. Per neurology note on 12/29/17, they believe he should have continued functional improvement to some degree.      Pulmonary care: " extubated 12/20  - Removed right chest tube 12/26   - Left pleural tube  removed 12/27  - Not requiring suctioning anymore  - On room air with saturations 97%  - Continue incentive spirometry and pulmonary toilet.         Cardiovascular:    -Hx HTN, HLD, CAD, AS and aortic aneurysm s/p bentall c/b endocarditis and severe AI s/p aortic root/valve homograft 12/19.  - Post operative echo showing EF of 20%, baseline 30%, heart failure has been following patient for medical management.    -Keep SBP<120  - Tolerating current medications well: Losartan 25 mg daily, Coreg 6.25 mg bid since 1/1, ASA 81 mg and atorvastatin.  - No bradycardia or pauses, had been in NSR, TPW removed 12/26.   - patient will follow up with Dr. Garzon his cardiologist on 1/12/18 in [place].   - He will follow up with CV surgery for post op visit 1/19/18 at 1:30pm.        GI care:   - PEG  - famotidine  - some loose stools but no frequent diarrhea, +BM, cdiff negative 12/26       Fluids/ Electrolytes/ Nutrition:   -PRN electrolyte replacement  -Bolus tube feeds through PEG bid, calorie counts to be performed by Nutrition. Some concerns by patient's partner that tube feeding causing liquid stools. Will not change regimen this close to discharge as Nutrition believes loose stools more likely caused by patient's multivitamin. I did changed multivitamin per Nutrition today.   - Laxatives also changed to PRN instead of scheduled  - Advanced to regular diet with thins 1/4 per speech.  Pt should be alert, upright, have 1:1 assist. Safe swallow strategies (written in room): chew on R (strong side), place straw in R corner of his mouth, encourage slow and small sips/bites.  - patient uses bed pan for stooling.       Renal/ Fluid Balance:   - patient is incontinent of urine  - Monitor BMP, creatinine  - Stable Bumex 1 mg po bid since 12/30 with supplemental potassium.        Endocrine:   - blood sugars WNL, is not diabetic. No insulin required.        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 Hx of MSSA septic arthritis and bacteremia. Hx of multifocal acute infarctions involving the left parietotemporal lobes, left cerebral hemisphere and right occipital lobe presumed to be septic emboli. ID following.   - Bronchial lavage cultures gerw pseudomonas aeruginosa on 12/20/17. Infectious disease team followed patient throughout hospital stay. He completed cefepime for 2 total weeks duration for pseudomonas pneumonia (end date 1/3/18) WBC stable 11.5. He is afebrile on RA.   - Fluconazole for oral thrush (7 days) 12/30, d/c given risk of qt prolongation on 1/3      ID rec's from 12/26  - restart PTA nafcillin 2g IV q4h, previously planned duration until 1/7/18  - continue rifampin 300mg PO BID, previously planned duration until 1/7/18  - Surgical tissue cultures 12/19- they have remained negative after 2 weeks. No further follow up needed with Infectious [facility] per them.        Heme:   - Acute blood loss anemia  - Hgb stable at 9.3.  - No evidence of bleeding         Prophylaxis:    -Mechanical prophylaxis for DVT.   -Heparin subq TID for DVT prophylaxis while at rehab facility.       Lines/ tubes/ drains:  -R triple lumen PICC for antibiotic infusion. May remove after antibiotics stopped.        Disposition:  - Patient will discharge to [facility] today 1/4/18 at 1630 with approval of family.\"     CODE STATUS/ADVANCE DIRECTIVES DISCUSSION:   CPR/Full code   Patient's living condition: lives with spouse    ALLERGIES:Lisinopril  PAST MEDICAL HISTORY:  has a past medical history of Abscess of aortic root (09/2017); AI (aortic insufficiency); Anxiety; Aortic root dilatation (H); AR (aortic regurgitation); Cardiomyopathy (H); CHF (congestive heart failure), NYHA class II (H); COPD, mild (H); CVA (cerebral vascular accident) (H) (09/2017); Depression (09/2017); Heart murmur; Hyperlipidemia LDL goal <70 (10/31/2010); Hypertension goal BP " (blood pressure) < 140/90; Inguinal hernia; left lung nodule  (1/29/2008); Major depressive disorder, single episode, moderate (H); Psoriasis (childhood); and Tobacco use disorder. He also has no past medical history of Difficulty in walking(719.7); History of blood transfusion; or Malignant hyperthermia.  PAST SURGICAL HISTORY:  has a past surgical history that includes SHOULDER ARTHROSCOPY, DX (2001); SHOULDER ARTHROSCOPY, DX (1/04); hernia repair, umbilical (1/08); rotator cuff repair rt/lt (11/10); Herniorrhaphy inguinal (12/21/2012); surgical history of - (5/16); Replace valve aortic (N/A, 5/17/2016); Arthroscopy knee incision and drainage (Left, 10/8/2017); Tracheostomy percutaneous (10/27/2017); and Repair aneurysm ascending aorta (N/A, 12/19/2017).  FAMILY HISTORY: family history includes Alzheimer Disease in his father; CANCER in his mother; Genetic Disorder in his mother.  SOCIAL HISTORY:  reports that he quit smoking about 21 months ago. He has a 35.00 pack-year smoking history. He has never used smokeless tobacco. He reports that he drinks alcohol. He reports that he uses illicit drugs.    Post Discharge Medication Reconciliation Status: discharge medications reconciled, continue medications without change.  Current Outpatient Prescriptions   Medication Sig Dispense Refill     acetaminophen (TYLENOL) 32 mg/mL solution 20.3 mLs (650 mg) by Per NG tube route every 4 hours as needed for mild pain 400 mL 1     aspirin 81 MG chewable tablet 1 tablet (81 mg) by Oral or Feeding Tube route daily 60 tablet 1     mirtazapine (REMERON) 30 MG tablet Take 1 tablet (30 mg) by mouth At Bedtime 30 tablet 1     ondansetron (ZOFRAN-ODT) 4 MG ODT tab Take 1 tablet (4 mg) by mouth every 6 hours as needed for nausea or vomiting 120 tablet 1     atorvastatin (LIPITOR) 40 MG tablet Take 1 tablet (40 mg) by mouth daily 60 tablet 1     losartan (COZAAR) 25 MG tablet Take 1 tablet (25 mg) by mouth every 12 hours 60 tablet 1      OLANZapine (ZYPREXA) 2.5 MG tablet Take 1 tablet (2.5 mg) by mouth 2 times daily 60 tablet 0     carvedilol (COREG) 6.25 MG tablet Take 1 tablet (6.25 mg) by mouth 2 times daily (with meals) 60 tablet 1     bumetanide (BUMEX) 1 MG tablet Take 1 tablet (1 mg) by mouth 2 times daily 60 tablet 0     senna-docusate (SENOKOT-S;PERICOLACE) 8.6-50 MG per tablet Take 1-2 tablets by mouth 2 times daily as needed for constipation 100 tablet 0     potassium chloride (KAYCIEL) 20 MEQ/15ML (10%) solution Take 15 mLs (20 mEq) by mouth 2 times daily 900 mL 0     multivitamin, therapeutic with minerals (THERA-VIT-M) TABS tablet Take 1 tablet by mouth daily 30 each 0     Carboxymethylcellulose Sod PF (REFRESH PLUS) 0.5 % SOLN ophthalmic solution Place 1 drop Into the left eye 3 times daily as needed (to flush debris from eye) 1 Bottle 1     famotidine (PEPCID) 20 MG tablet Take 1 tablet (20 mg) by mouth 2 times daily 60 tablet 1     Heparin Sodium, Porcine, (HEPARIN SODIUM PF) 5000 UNIT/0.5ML injection Inject 0.5 mLs (5,000 Units) Subcutaneous every 8 hours       oxyCODONE IR (ROXICODONE) 5 MG tablet Take 1 tablet (5 mg) by mouth every 4 hours as needed for moderate to severe pain 40 tablet 0     LORazepam (ATIVAN) 0.5 MG tablet Take 1 tablet (0.5 mg) by mouth 2 times daily as needed for anxiety 60 tablet 0     loperamide (IMODIUM A-D) 2 MG tablet 2 tab by g tube 3 times daily PRN for diarrhea 30 tablet 0     lactobacillus TABS Take 1 tablet by mouth 3 times daily       Menthol-Zinc Oxide 0.44-20.6 % OINT 1 application topically TID PRN for perianal irritation       Miconazole Nitrate 2 % POWD Apply in folds       ketotifen (ZADITOR/REFRESH ANTI-ITCH) 0.025 % SOLN ophthalmic solution Place 1 drop Into the left eye 3 times daily 1 Bottle      fiber modular (NUTRISOURCE FIBER) packet 1 packet by Per Feeding Tube route 3 times daily         ROS:  10 point ROS of systems including Constitutional, Eyes, Respiratory, Cardiovascular,  "Gastroenterology, Genitourinary, Integumentary, Muscularskeletal, Psychiatric were all negative except for pertinent positives noted in my HPI.    Exam:  BP (!) 135/92  Pulse 86  Temp 98  F (36.7  C)  Resp 16  Ht 5' 8\" (1.727 m)  Wt 173 lb 8 oz (78.7 kg)  SpO2 96%  BMI 26.38 kg/m2  GENERAL APPEARANCE:  Alert, anxious  ENT:  Mouth and posterior oropharynx normal, moist mucous membranes, [place]  EYES:  conjunctival erythema left, crusting, mattering present left, ectropion left lower lid  NECK:  No adenopathy,masses or thyromegaly, no adenopathy  RESP:  respiratory effort and palpation of chest normal, lungs clear to auscultation , no respiratory distress  CV:  Palpation and auscultation of heart done , regular rate and rhythm, no murmur, rub, or gallop, peripheral edema 2+ in lower extremities  ABDOMEN:  normal bowel sounds, soft, nontender, no hepatosplenomegaly or other masses, gastrostomy tube in place, stoma skin intact, no leakage or excoriation  M/S:   Gait and station abnormal right hemiparesis.  Mechanical lift, total assist with mobility and needs support to sit up  Digits and nails abnormal hypertrophic  SKIN:  Inspection of skin and subcutaneous tissue baseline, Palpation of skin and subcutaneous tissue baseline, sternal incision CDI, scabbed over.  NEURO:   Visual field cut, slurred speech, left sided facial droop, left hemiparesis  PSYCH:  insight and judgement impaired, memory impaired , anxious     BP 01/04-01/07: 118-155/61-97 mmHg    Lab/Diagnostic data:    CBC RESULTS:   Recent Labs   Lab Test  01/02/18   0701  01/01/18   0819   WBC  11.5*  12.7*   RBC  3.05*  3.01*   HGB  9.3*  9.4*   HCT  31.4*  31.0*   MCV  103*  103*   MCH  30.5  31.2   MCHC  29.6*  30.3*   RDW  19.1*  19.0*   PLT  497*  495*       Last Basic Metabolic Panel:  Recent Labs   Lab Test  01/02/18   0701  01/01/18   0819   NA  141  145*   POTASSIUM  4.1  4.0   CHLORIDE  109  110*   IZABELLA  9.0  9.4   CO2  26  25   BUN  27  27   CR  " 0.94  0.84   GLC  113*  102*       Liver Function Studies -   Recent Labs   Lab Test  12/18/17   0359  12/17/17 2024   PROTTOTAL  7.0  7.0   ALBUMIN  1.5*  1.6*   BILITOTAL  1.3  1.0   ALKPHOS  92  94   AST  32  29   ALT  30  32       Lab Results   Component Value Date    A1C 5.3 12/19/2017    A1C 6.4 10/07/2017       ASSESSMENT/PLAN:  (I39) 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   Plan: completed course of ABX.  Follow CBC and BMP  Cleanse chest daily and closely monitor for S/SX infection  If diarrhea not improved, will need stool culture to rule out C Diff.  Barrier cream moises-area.       (I63.9) Cerebrovascular accident (CVA), unspecified mechanism (H)  (R13.12) Oropharyngeal dysphagia  (Z93.1) Gastrostomy tube in place (H)  Comment: beginning recovery  Plan: ST working with swallowing and speaking and memory  OT cognitive and upper body mobility for ADL training  Continue daily ASA, Lipitor, Heparin injections q 8 hours during his rehab stay    (I50.9) Congestive heart failure, NYHA class 2, unspecified congestive heart failure type (H)  (I10) Hypertension goal BP (blood pressure) < 140/90  Comment: followed by [facility] clinic  Plan: daily weights, will coordinate diuresis with [facility] NP if weight changes.  Continue Coreg, Bumex, Losartan.  Weekly BMP    (J44.9) COPD, mild (H)  (F17.200) Tobacco use disorder  Comment: no longer smoking  Plan:  monitor respiratory status    (F32.1) Major depressive disorder, single episode, moderate (HCC)  (F41.9) Anxiety  Comment: life changing event  Plan: continue on Olanzapine, Mirtazapine, scheduled for now.  Will continue to monitor behavior and attempt to wean off Olanzapine  if able.  Mirtazapine dosage reduction is not indicated at this time.  Lorazepam BID PRN is needed at this time due to his uncertainty of environment 2/2 recent stroke.     (H01.005) Blepharitis of left lower eyelid, unspecified type  (H02.105) Ectropion of left lower eyelid, unspecified ectropion type  Comment: acute, developed about 1 week ago  Plan: Warm wet compress to left eye QID, QID artificial tears and artificial tear ointment at HS  If drainage persists may need antibiotic    Electronically signed by:  BIBI Bowden CNP

## 2018-01-08 NOTE — LETTER
TCU Hand In    Patient name: Cricket Miguel  PCP: Dipak Gordillo  PCP Care Coordinator's information (phone number/email): Gene Trivedi, [phone]  Primary family contact: CarliwyattEloy polo NONE  Other MDs involved: NA    Patient Care Team:  Dipak Gordillo MD as PCP - General    Resources in place previously (home care services, equipment needs, etc.):   Equipment Currently Used at Home hospital bed, lift device, wheelchair, manual, commode, other (see comments) [Khushboo, bedpan]        Transportation Available family or friend will provide, other (see comments) [medical transportation]       [facility] [phone]       Home Care [facility] [phone], Fax: [phone]       Home Infusion Provider [facility] [phone], Fax: [phone]         Advanced Directive: N    Social History     Social History     Marital status:      Spouse name: N/A     Number of children: 3     Years of education: 12     Occupational History     Not on file.     Social History Main Topics     Smoking status: Former Smoker     Packs/day: 1.00     Years: 35.00     Quit date: 4/1/2016     Smokeless tobacco: Never Used      Comment: 4/2016     Alcohol use 0.0 oz/week     0 Standard drinks or equivalent per week      Comment: 1 drink per week avg, seldom     Drug use: Yes      Comment: marijuana- daily     Sexual activity: Yes     Partners: Female     Other Topics Concern     Caffeine Concern Yes     3 cups     Sleep Concern No     Special Diet No     trying to watch salt     Exercise Yes     walking     Seat Belt No     Parent/Sibling W/ Cabg, Mi Or Angioplasty Before 65f 55m? No     Social History Narrative       Additional social history: NA    Known coverage issues: none    RICH score: Elevated    Please contact me with discharge planning info. I would like to attend any care conferences you have as well.    -Jane Trivedi

## 2018-01-09 ENCOUNTER — NURSING HOME VISIT (OUTPATIENT)
Dept: GERIATRICS | Facility: CLINIC | Age: 63
End: 2018-01-09
Payer: COMMERCIAL

## 2018-01-09 ENCOUNTER — HOSPITAL LABORATORY (OUTPATIENT)
Dept: OTHER | Facility: CLINIC | Age: 63
End: 2018-01-09

## 2018-01-09 VITALS
WEIGHT: 172.2 LBS | OXYGEN SATURATION: 95 % | DIASTOLIC BLOOD PRESSURE: 81 MMHG | BODY MASS INDEX: 26.1 KG/M2 | HEIGHT: 68 IN | RESPIRATION RATE: 16 BRPM | HEART RATE: 70 BPM | SYSTOLIC BLOOD PRESSURE: 125 MMHG | TEMPERATURE: 98 F

## 2018-01-09 DIAGNOSIS — I42.9 CARDIOMYOPATHY, UNSPECIFIED TYPE (H): ICD-10-CM

## 2018-01-09 DIAGNOSIS — I39 ENDOCARDITIS AND HEART VALVE DISORDERS IN DISEASES CLASSIFIED ELSEWHERE: Primary | ICD-10-CM

## 2018-01-09 DIAGNOSIS — R53.81 PHYSICAL DECONDITIONING: ICD-10-CM

## 2018-01-09 DIAGNOSIS — I63.9 CEREBROVASCULAR ACCIDENT (CVA), UNSPECIFIED MECHANISM (H): ICD-10-CM

## 2018-01-09 LAB
CRP SERPL-MCNC: 34.3 MG/L (ref 0–8)
ERYTHROCYTE [DISTWIDTH] IN BLOOD BY AUTOMATED COUNT: 18.2 % (ref 10–15)
HCT VFR BLD AUTO: 35.8 % (ref 40–53)
HGB BLD-MCNC: 11.3 G/DL (ref 13.3–17.7)
MCH RBC QN AUTO: 31.8 PG (ref 26.5–33)
MCHC RBC AUTO-ENTMCNC: 31.6 G/DL (ref 31.5–36.5)
MCV RBC AUTO: 101 FL (ref 78–100)
PLATELET # BLD AUTO: 430 10E9/L (ref 150–450)
RBC # BLD AUTO: 3.55 10E12/L (ref 4.4–5.9)
WBC # BLD AUTO: 7.2 10E9/L (ref 4–11)

## 2018-01-09 PROCEDURE — 99306 1ST NF CARE HIGH MDM 50: CPT | Performed by: INTERNAL MEDICINE

## 2018-01-09 NOTE — MR AVS SNAPSHOT
After Visit Summary   1/9/2018    Cricket Miguel    MRN: [id number]           Patient Information     Date Of Birth          1955        Visit Information        Provider Department      1/9/2018 7:00 AM Karla Alcaraz MD [facility]        Today's Diagnoses     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    -  1    Cardiomyopathy, unspecified type (H)        Cerebrovascular accident (CVA), unspecified mechanism (H)        Physical deconditioning           Follow-ups after your visit        Your next 10 appointments already scheduled     Jan 19, 2018  1:30 PM CST   (Arrive by 1:15 PM)   Return Visit with  CVTS   [facility] ([facility])    [address]  [address]  [place] [address] [phone]            Jan 23, 2018  1:50 PM CST   CORE Enrollment with BIBI Bettencourt CNP   [facility] ([facility])    [address]  [place] [address] [phone]              Future tests that were ordered for you today     Open Future Orders        Priority Expected Expires Ordered    Follow-Up with [facility] - JENNIFER visit Routine 1/26/2018 1/12/2019 1/12/2018            Who to contact     If you have questions or need follow up information about today's clinic visit or your schedule please contact [facility] directly at [phone].  Normal or non-critical lab and imaging results will be communicated to you by MyChart, letter or phone within 4 business days after the clinic has received the results. If you do not hear from us within 7 days, please contact the clinic through MyChart or phone. If you have a critical or abnormal lab result, we will notify you by phone as soon as possible.  Submit refill requests through [employer] or call your pharmacy and they will forward the refill request to us. Please allow 3  "business days for your refill to be completed.          Additional Information About Your Visit        MyChart Information     [employer] lets you send messages to your doctor, view your test results, renew your prescriptions, schedule appointments and more. To sign up, go to [email or web address] . Click on \"Log in\" on the left side of the screen, which will take you to the Welcome page. Then click on \"Sign up Now\" on the right side of the page.     You will be asked to enter the access code listed below, as well as some personal information. Please follow the directions to create your username and password.     Your access code is: [email or web address]  Expires: 2018  7:55 AM     Your access code will  in 90 days. If you need help or a new code, please call your [place] clinic or [phone].        Care EveryWhere ID     This is your Care EveryWhere ID. This could be used by other organizations to access your [place] medical records  [id number]        Your Vitals Were     Pulse Temperature Respirations Height Pulse Oximetry BMI (Body Mass Index)    70 98  F (36.7  C) 16 5' 8\" (1.727 m) 95% 26.18 kg/m2       Blood Pressure from Last 3 Encounters:   18 116/84   18 125/81   18 (!) 135/92    Weight from Last 3 Encounters:   18 172 lb 3.2 oz (78.1 kg)   18 173 lb 8 oz (78.7 kg)   18 165 lb 2 oz (74.9 kg)              Today, you had the following     No orders found for display       Primary Care Provider Office Phone # Fax #    Dipak Gordillo [phone]       [facility] [address]        Equal Access to Services     [facility] NEGAR : Cisco Almodovar, wanico kelly, qaybta kaalrene stinson, freddie waggoner. So [facility] [phone].    ATENCIÓN: Si habla español, tiene a mendiola disposición servicios gratuitos de asistencia lingüística. Lltam al [phone].    We comply with applicable federal civil rights " laws and [place] laws. We do not discriminate on the basis of race, color, national origin, age, disability, sex, sexual orientation, or gender identity.            Thank you!     Thank you for choosing [facility]  for your care. Our goal is always to provide you with excellent care. Hearing back from our patients is one way we can continue to improve our services. Please take a few minutes to complete the written survey that you may receive in the mail after your visit with us. Thank you!             Your Updated Medication List - Protect others around you: Learn how to safely use, store and throw away your medicines at www.disposemymeds.org.          This list is accurate as of: 1/9/18 11:59 PM.  Always use your most recent med list.                   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, Cerebrovascular accident (CVA) due to other mechanism (H)       atorvastatin 40 MG tablet    LIPITOR    60 tablet    Take 1 tablet (40 mg) by mouth daily    Cerebrovascular accident (CVA) due to other mechanism (H)       bumetanide 1 MG tablet    BUMEX    60 tablet    Take 1 tablet (1 mg) by mouth 2 times daily    Cardiomyopathy, unspecified type (H)       Carboxymethylcellulose Sod PF 0.5 % Soln ophthalmic solution    REFRESH PLUS    1 Bottle    Place 1 drop Into the left eye 3 times daily as needed (to flush debris from eye)    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       carvedilol 6.25 MG tablet    COREG    60 tablet    Take 1 tablet (6.25 mg) by mouth 2 times daily (with meals)    Endocarditis and heart valve disorders  in diseases classified elsewhere, Hypertension goal BP (blood pressure) < 140/90       famotidine 20 MG tablet    PEPCID    60 tablet    Take 1 tablet (20 mg) by mouth 2 times daily    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       ketotifen 0.025 % Soln ophthalmic solution    ZADITOR/REFRESH ANTI-ITCH    1 Bottle    Place 1 drop Into the left eye 3 times daily        lactobacillus Tabs      Take 1 tablet by mouth 3 times daily        loperamide 2 MG tablet    IMODIUM A-D    30 tablet    2 tab by g tube 3 times daily PRN for diarrhea        LORazepam 0.5 MG tablet    ATIVAN    60 tablet    Take 1 tablet (0.5 mg) by mouth 2 times daily as needed for anxiety    Anxiety       losartan 25 MG tablet    COZAAR    60 tablet    Take 1 tablet (25 mg) by mouth every 12 hours    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, Hypertension goal BP (blood pressure) < 140/90       Menthol-Zinc Oxide 0.44-20.6 % Oint      1 application topically TID PRN for perianal irritation        Miconazole Nitrate 2 % Powd      Apply in folds        mirtazapine 30 MG tablet    REMERON    30 tablet    Take 1 tablet (30 mg) by mouth At Bedtime    Depression, unspecified depression type       multivitamin, therapeutic with minerals Tabs tablet     30 each    Take 1 tablet by mouth daily    Cerebrovascular accident (CVA) due to other mechanism (H)       ondansetron 4 MG ODT tab    ZOFRAN-ODT    120 tablet    Take 1 tablet (4 mg) by mouth every 6 hours as needed for nausea or vomiting    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       oxyCODONE IR 5 MG tablet    ROXICODONE    40 tablet    Take 1 tablet (5 mg) by mouth every 4 hours as needed for moderate to severe pain    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, Acute post-operative pain       potassium chloride 20 MEQ/15ML (10%) solution    KAYCIEL    900 mL    Take 15 mLs (20  mEq) by mouth 2 times daily    Cardiomyopathy, unspecified type (H)       senna-docusate 8.6-50 MG per tablet    SENOKOT-S;PERICOLACE    100 tablet    Take 1-2 tablets by mouth 2 times daily as needed for constipation    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

## 2018-01-10 ENCOUNTER — RECORDS - HEALTHEAST (OUTPATIENT)
Dept: ADMINISTRATIVE | Facility: OTHER | Age: 63
End: 2018-01-10

## 2018-01-10 NOTE — PROGRESS NOTES
PRIMARY CARE PROVIDER AND CLINIC RESPONSIBLE:  Duy Dipak, [address] [facility] [address]        ADMISSION HISTORY AND PHYSICAL EXAMINATION     Chief Complaint   Patient presents with     Hospital F/U         HISTORY OF PRESENT ILLNESS:  62 year old male, (1955), admitted to the [facility] TCU for continuation of medical care and rehab.    Pt admitted [facility] 12/16 to 1/4 for endocarditis. He was found to have large moises-aortic abscess around his AVR and multiple septic emboli who presented to [facility] for severe AR, s/p redo sternotomy and aortic root valve homograft 12/19. 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    Pt denies any chest tightness/fever/chills/worsening SOB. He is done with his ABx.    Please see Karon Rowleyon's admit noted dated 1/8  for details of admission, past medical history, family history, allergies, medication list, social history and other details pertinent with this admission. Hospital admission and dc summary reviewed.      Past Medical History:   Diagnosis Date     Abscess of aortic root 09/2017     AI (aortic insufficiency)     severe     Anxiety      Aortic root dilatation (H)      AR (aortic regurgitation)     severe     Cardiomyopathy (H)      CHF (congestive heart failure), NYHA class II (H)      COPD, mild (H)     spirometry 12/16     CVA (cerebral vascular accident) (H) 09/2017    septic emboli - MSSA - cerebellum, Left MCA, Rt Occipital, Aphasia, Left visual field cut, moderate to severe encephalopathy     Depression 09/2017     Heart murmur      Hyperlipidemia LDL goal <70 10/31/2010     Hypertension goal BP (blood pressure) < 140/90      Inguinal hernia      left lung nodule  1/29/2008     Major depressive disorder, single episode, moderate (H)      Psoriasis childhood     Tobacco use disorder        Past Surgical History:   Procedure Laterality Date      ARTHROSCOPY KNEE INCISION AND DRAINAGE Left 10/8/2017    Arthroscopic Incision and Drainage of Left Knee - Dr Munoz     HC SHOULDER ARTHROSCOPY, DX  2001    Arthroscopy, Shoulder RT Dr OSIRIS Andersen     HC SHOULDER ARTHROSCOPY, DX  1/04    LT arthroscopy Dr OSIRIS Andersen     HERNIA REPAIR, UMBILICAL  1/08    incarcerated - dr rockwell     HERNIORRHAPHY INGUINAL  12/21/2012    HERNIORRHAPHY INGUINAL;  Right Inguinal Hernia Repair with mesh ;  Surgeon: Mello Rockwell MD;  Location: [facility] OR     REPAIR ANEURYSM ASCENDING AORTA N/A 12/19/2017    REPAIR ANEURYSM ASCENDING AORTA;  REDO Median STERNOTOMY, FEMORAL CANNULATION, Lysis of adhesions, AORTIC ROOT VALVE HOMOGRAFT with 26mm x 7.0cm Cry[employer]ife Aortic valve and conduit - Dr Bowers     REPLACE VALVE AORTIC N/A 5/17/2016    REPLACE VALVE AORTIC - Dr Bowers     ROTATOR CUFF REPAIR RT/LT  11/10    Rt arthroscopy - Dr OSIRIS Andersen     SURGICAL HISTORY OF -   5/16    AVR, severe AR, aortic root repair     TRACHEOSTOMY PERCUTANEOUS  10/27/2017            Current Outpatient Prescriptions   Medication Sig     acetaminophen (TYLENOL) 32 mg/mL solution 20.3 mLs (650 mg) by Per NG tube route every 4 hours as needed for mild pain     aspirin 81 MG chewable tablet 1 tablet (81 mg) by Oral or Feeding Tube route daily     mirtazapine (REMERON) 30 MG tablet Take 1 tablet (30 mg) by mouth At Bedtime     ondansetron (ZOFRAN-ODT) 4 MG ODT tab Take 1 tablet (4 mg) by mouth every 6 hours as needed for nausea or vomiting     atorvastatin (LIPITOR) 40 MG tablet Take 1 tablet (40 mg) by mouth daily     losartan (COZAAR) 25 MG tablet Take 1 tablet (25 mg) by mouth every 12 hours     OLANZapine (ZYPREXA) 2.5 MG tablet Take 1 tablet (2.5 mg) by mouth 2 times daily     carvedilol (COREG) 6.25 MG tablet Take 1 tablet (6.25 mg) by mouth 2 times daily (with meals)     bumetanide (BUMEX) 1 MG tablet Take 1 tablet (1 mg) by mouth 2 times daily     senna-docusate (SENOKOT-S;PERICOLACE) 8.6-50 MG per tablet  Take 1-2 tablets by mouth 2 times daily as needed for constipation     potassium chloride (KAYCIEL) 20 MEQ/15ML (10%) solution Take 15 mLs (20 mEq) by mouth 2 times daily     multivitamin, therapeutic with minerals (THERA-VIT-M) TABS tablet Take 1 tablet by mouth daily     Carboxymethylcellulose Sod PF (REFRESH PLUS) 0.5 % SOLN ophthalmic solution Place 1 drop Into the left eye 3 times daily as needed (to flush debris from eye)     famotidine (PEPCID) 20 MG tablet Take 1 tablet (20 mg) by mouth 2 times daily     oxyCODONE IR (ROXICODONE) 5 MG tablet Take 1 tablet (5 mg) by mouth every 4 hours as needed for moderate to severe pain     LORazepam (ATIVAN) 0.5 MG tablet Take 1 tablet (0.5 mg) by mouth 2 times daily as needed for anxiety     loperamide (IMODIUM A-D) 2 MG tablet 2 tab by g tube 3 times daily PRN for diarrhea     lactobacillus TABS Take 1 tablet by mouth 3 times daily     Menthol-Zinc Oxide 0.44-20.6 % OINT 1 application topically TID PRN for perianal irritation     Miconazole Nitrate 2 % POWD Apply in folds     ketotifen (ZADITOR/REFRESH ANTI-ITCH) 0.025 % SOLN ophthalmic solution Place 1 drop Into the left eye 3 times daily     No current facility-administered medications for this visit.        Allergies   Allergen Reactions     Lisinopril Swelling     One-sided facial swelling after being on lisinopril/HCTZ for one week.        Social History     Social History     Marital status:      Spouse name: N/A     Number of children: 3     Years of education: 12     Occupational History     Not on file.     Social History Main Topics     Smoking status: Former Smoker     Packs/day: 1.00     Years: 35.00     Quit date: 4/1/2016     Smokeless tobacco: Never Used      Comment: 4/2016     Alcohol use 0.0 oz/week     0 Standard drinks or equivalent per week      Comment: 1 drink per week avg, seldom     Drug use: Yes      Comment: marijuana- daily     Sexual activity: Yes     Partners: Female     Other Topics  "Concern     Caffeine Concern Yes     3 cups     Sleep Concern No     Special Diet No     trying to watch salt     Exercise Yes     walking     Seat Belt No     Parent/Sibling W/ Cabg, Mi Or Angioplasty Before 65f 55m? No     Social History Narrative          Information reviewed:  Medications, vital signs, orders, nursing notes, problem list, hospital information.     ROS: All 10 point review of system completed, those pertinent positive, please see H&P, the remaining ROS is negative.    /81  Pulse 70  Temp 98  F (36.7  C)  Resp 16  Ht 5' 8\" (1.727 m)  Wt 172 lb 3.2 oz (78.1 kg)  SpO2 95%  BMI 26.18 kg/m2    PHYSICAL EXAMINATION:   GENERAL:  No acute distress. Sitting on WC, significant other present.  SKIN:  Dry and warm.  There is no rash, lesions, ulcers or juandice at area of skin examined.  HEENT:  Head without trauma.  R Pupil round, reactive. Decreased vision L eye. L facial droop.  NECK:  Supple.  There is no cervical adenopathy, no thyromegaly. No jugular venous distension.  CHEST: No reproducible chest tenderness.   LUNGS:  Normal respiratory effort. + crackles RLL.  HEART:  Regular rate and rhythm.  No murmur, gallops or rubs auscultated.  ABDOMEN:  Soft, bowel sounds positive.  There is no tenderness or guarding. + G tube site C/D/I.  EXTREMITIES: 1+ edema.  NEUROLOGIC:  Alert and oriented to self, place and situation. R hemiparesis.    Lab/Diagnostic data:  Reviewed    Lab Results   Component Value Date    WBC 11.2 01/12/2018     Lab Results   Component Value Date    RBC 4.04 01/12/2018     Lab Results   Component Value Date    HGB 12.8 01/12/2018     Lab Results   Component Value Date    HCT 41.7 01/12/2018     Lab Results   Component Value Date     01/12/2018     Lab Results   Component Value Date    MCH 31.7 01/12/2018     Lab Results   Component Value Date    MCHC 30.7 01/12/2018     Lab Results   Component Value Date    RDW 16.8 01/12/2018     Lab Results   Component Value Date "     01/12/2018       Last Basic Metabolic Panel:  Lab Results   Component Value Date     01/12/2018      Lab Results   Component Value Date    POTASSIUM 4.6 01/12/2018     Lab Results   Component Value Date    CHLORIDE 101 01/12/2018     Lab Results   Component Value Date    IZABELLA 9.7 01/12/2018     Lab Results   Component Value Date    CO2 26 01/12/2018     Lab Results   Component Value Date    BUN 28 01/12/2018     Lab Results   Component Value Date    CR 1.06 01/12/2018     Lab Results   Component Value Date     01/12/2018         ASSESSMENT / PLAN:     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  - s/p aortic root and AVR with homograft.  - s/p nafcillin and rifampin until 1/7.  - Cx NGTD.  - f/u with CTS as scheduled.    Cardiomyopathy, unspecified type (H)  - TTE 12/2017 showed EF 25%.  - On bumex, coreg and losartan.  - Daily weights.  - Low salt diet.  - f/u with cardiology.    Cerebrovascular accident (CVA), unspecified mechanism (H)  - MRI brain 10/2017 showed multiple infarcts.  - On ASA and lipitor.    Depression and anxiety.  - On remeron, ativan and zyprexa.    Physical deconditioning  -Plan: PT/OT, fall precautions. Care conference with patient and family for the progress of rehab and disposition issues will be discussed as planned. Rehab evaluation and other evaluations including CPT are at rehab logs, to be reviewed separately.  Fall risk assessment as well as cognitive evaluation will be formed during rehab stay if indicated.      Other problems with same care. Primary care doctor and other specialists to address those chronic problems in next clinic appointment to be scheduled upon discharge from the TCU.    Total time spent with patient visit was 45 min including patient visit, review of past records, 1/2 time on patients counseling and coordinating care.        Karla Alcaraz MD

## 2018-01-10 NOTE — TELEPHONE ENCOUNTER
Called MELANIE kelly at number below - line was busy     Will try again later     Jane Pizarro RN, BSN  [place] [facility]

## 2018-01-11 NOTE — TELEPHONE ENCOUNTER
Called MELANIE Lyons, @ [phone]     States she is going to call Rene to see how long they will be able to hold the patient for and call back to discuss plan.     Joycelyn Jones RN  [facility]

## 2018-01-12 ENCOUNTER — OFFICE VISIT (OUTPATIENT)
Dept: CARDIOLOGY | Facility: CLINIC | Age: 63
End: 2018-01-12
Payer: COMMERCIAL

## 2018-01-12 VITALS — DIASTOLIC BLOOD PRESSURE: 84 MMHG | HEIGHT: 68 IN | SYSTOLIC BLOOD PRESSURE: 116 MMHG | HEART RATE: 84 BPM

## 2018-01-12 DIAGNOSIS — I63.89 CEREBROVASCULAR ACCIDENT (CVA) DUE TO OTHER MECHANISM (H): ICD-10-CM

## 2018-01-12 DIAGNOSIS — F41.9 ANXIETY: ICD-10-CM

## 2018-01-12 DIAGNOSIS — I10 HYPERTENSION GOAL BP (BLOOD PRESSURE) < 140/90: ICD-10-CM

## 2018-01-12 DIAGNOSIS — I10 ESSENTIAL HYPERTENSION: ICD-10-CM

## 2018-01-12 DIAGNOSIS — R06.02 SOB (SHORTNESS OF BREATH): ICD-10-CM

## 2018-01-12 DIAGNOSIS — Z95.2 S/P AVR: ICD-10-CM

## 2018-01-12 DIAGNOSIS — I25.5 ISCHEMIC CARDIOMYOPATHY: Primary | ICD-10-CM

## 2018-01-12 DIAGNOSIS — F05 DELIRIUM DUE TO ANOTHER MEDICAL CONDITION: ICD-10-CM

## 2018-01-12 LAB
ALBUMIN SERPL-MCNC: 2.6 G/DL (ref 3.4–5)
ALP SERPL-CCNC: 106 U/L (ref 40–150)
ALT SERPL W P-5'-P-CCNC: 70 U/L (ref 0–70)
ANION GAP SERPL CALCULATED.3IONS-SCNC: 10 MMOL/L (ref 3–14)
AST SERPL W P-5'-P-CCNC: 51 U/L (ref 0–45)
BASOPHILS # BLD AUTO: 0.1 10E9/L (ref 0–0.2)
BASOPHILS NFR BLD AUTO: 1.1 %
BILIRUB SERPL-MCNC: 0.7 MG/DL (ref 0.2–1.3)
BUN SERPL-MCNC: 28 MG/DL (ref 7–30)
CALCIUM SERPL-MCNC: 9.7 MG/DL (ref 8.5–10.1)
CHLORIDE SERPL-SCNC: 101 MMOL/L (ref 94–109)
CO2 SERPL-SCNC: 26 MMOL/L (ref 20–32)
CREAT SERPL-MCNC: 1.06 MG/DL (ref 0.66–1.25)
DIFFERENTIAL METHOD BLD: ABNORMAL
EOSINOPHIL # BLD AUTO: 2.9 10E9/L (ref 0–0.7)
EOSINOPHIL NFR BLD AUTO: 25.7 %
ERYTHROCYTE [DISTWIDTH] IN BLOOD BY AUTOMATED COUNT: 16.8 % (ref 10–15)
GFR SERPL CREATININE-BSD FRML MDRD: 70 ML/MIN/1.7M2
GLUCOSE SERPL-MCNC: 101 MG/DL (ref 70–99)
HCT VFR BLD AUTO: 41.7 % (ref 40–53)
HGB BLD-MCNC: 12.8 G/DL (ref 13.3–17.7)
IMM GRANULOCYTES # BLD: 0.1 10E9/L (ref 0–0.4)
IMM GRANULOCYTES NFR BLD: 0.4 %
LYMPHOCYTES # BLD AUTO: 1.7 10E9/L (ref 0.8–5.3)
LYMPHOCYTES NFR BLD AUTO: 15.1 %
MCH RBC QN AUTO: 31.7 PG (ref 26.5–33)
MCHC RBC AUTO-ENTMCNC: 30.7 G/DL (ref 31.5–36.5)
MCV RBC AUTO: 103 FL (ref 78–100)
MONOCYTES # BLD AUTO: 0.8 10E9/L (ref 0–1.3)
MONOCYTES NFR BLD AUTO: 7.1 %
NEUTROPHILS # BLD AUTO: 5.7 10E9/L (ref 1.6–8.3)
NEUTROPHILS NFR BLD AUTO: 50.6 %
NRBC # BLD AUTO: 0 10*3/UL
NRBC BLD AUTO-RTO: 0 /100
PLATELET # BLD AUTO: 398 10E9/L (ref 150–450)
POTASSIUM SERPL-SCNC: 4.6 MMOL/L (ref 3.4–5.3)
PROT SERPL-MCNC: 8.6 G/DL (ref 6.8–8.8)
RBC # BLD AUTO: 4.04 10E12/L (ref 4.4–5.9)
SODIUM SERPL-SCNC: 137 MMOL/L (ref 133–144)
TSH SERPL DL<=0.005 MIU/L-ACNC: 3.2 MU/L (ref 0.4–4)
WBC # BLD AUTO: 11.2 10E9/L (ref 4–11)

## 2018-01-12 PROCEDURE — 36415 COLL VENOUS BLD VENIPUNCTURE: CPT | Performed by: INTERNAL MEDICINE

## 2018-01-12 PROCEDURE — 99214 OFFICE O/P EST MOD 30 MIN: CPT | Performed by: INTERNAL MEDICINE

## 2018-01-12 PROCEDURE — 80050 GENERAL HEALTH PANEL: CPT | Performed by: INTERNAL MEDICINE

## 2018-01-12 RX ORDER — OLANZAPINE 2.5 MG/1
2.5 TABLET, FILM COATED ORAL AT BEDTIME
Qty: 60 TABLET | Refills: 0 | COMMUNITY
Start: 2018-01-12 | End: 2018-01-01

## 2018-01-12 NOTE — PROGRESS NOTES
Cardiology Progress Note          Assessment and Plan:     1. 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    From a cardiac standpoint, patient is doing well.  His cardiovascular medications are appropriate.      2. Coronary artery disease status post CABG at the time of his initial valve replacement    Continue medical therapy      3. Stroke with left-sided facial droop and right-sided hemiparesis    Continue rehabilitation      4. Cardiomyopathy, valvular and ischemic    Enrollment in core clinic    This note was transcribed using electronic voice recognition software and there may be typographical errors present.                Interval History:   The patient is a 62 year old who has had a tragic couple of years.  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  He was doing fine through 2016, but then developed a stroke in 2017 and eventually effectively dehisced his ascending aorta repair requiring re-replacement during this last hospitalization.  He now has cardiomyopathy with ejection fraction around 20-30% and is very depressed at his extreme left facial droop and right hemiparesis.  He has had a very difficult couple of months.  Volume status appears even.  We are enlisting core clinic to help with his diuretic management.  He is only on a small dose of beta-blocker with good heart rates.                     Review of Systems:   Review of Systems:  Skin:  Positive for rash   Eyes:  Positive for glasses  ENT:  Negative    Respiratory:  Positive for cough  Cardiovascular:    fatigue;Positive for;edema  Gastroenterology: Negative    Genitourinary:  Negative    Musculoskeletal:  Negative    Neurologic:  Positive for paralysis;stroke  Psychiatric:  Positive for anxiety  Heme/Lymph/Imm:  Positive for allergies  Endocrine:  Negative      "           Physical Exam:     Vitals: /84 (BP Location: Left arm, Patient Position: Sitting, Cuff Size: Adult Regular)  Pulse 84  Ht 1.727 m (5' 8\")  Constitutional:  cooperative chronically ill      Skin:  warm and dry to the touch        Head:  normocephalic        Eyes:  sclera white   left lid lag and facial droop    ENT:  no pallor or cyanosis        Neck:  JVP normal        Chest:     (bilateral basilar LS diminished) clear bilaterally    Cardiac: regular rhythm                  Abdomen:      G-tube    Extremities and Back:  no deformities, clubbing, cyanosis, erythema observed        Neurological:    facial weakness;right sided weakness;tongue deviation left facial droop, right hemiparesis.             Medications:     Current Outpatient Prescriptions   Medication Sig Dispense Refill     OLANZapine (ZYPREXA) 2.5 MG tablet Take 1 tablet (2.5 mg) by mouth At Bedtime 60 tablet 0     acetaminophen (TYLENOL) 32 mg/mL solution 20.3 mLs (650 mg) by Per NG tube route every 4 hours as needed for mild pain 400 mL 1     aspirin 81 MG chewable tablet 1 tablet (81 mg) by Oral or Feeding Tube route daily 60 tablet 1     mirtazapine (REMERON) 30 MG tablet Take 1 tablet (30 mg) by mouth At Bedtime 30 tablet 1     ondansetron (ZOFRAN-ODT) 4 MG ODT tab Take 1 tablet (4 mg) by mouth every 6 hours as needed for nausea or vomiting 120 tablet 1     atorvastatin (LIPITOR) 40 MG tablet Take 1 tablet (40 mg) by mouth daily 60 tablet 1     losartan (COZAAR) 25 MG tablet Take 1 tablet (25 mg) by mouth every 12 hours 60 tablet 1     carvedilol (COREG) 6.25 MG tablet Take 1 tablet (6.25 mg) by mouth 2 times daily (with meals) 60 tablet 1     bumetanide (BUMEX) 1 MG tablet Take 1 tablet (1 mg) by mouth 2 times daily 60 tablet 0     senna-docusate (SENOKOT-S;PERICOLACE) 8.6-50 MG per tablet Take 1-2 tablets by mouth 2 times daily as needed for constipation 100 tablet 0     potassium chloride (KAYCIEL) 20 MEQ/15ML (10%) solution " Take 15 mLs (20 mEq) by mouth 2 times daily 900 mL 0     multivitamin, therapeutic with minerals (THERA-VIT-M) TABS tablet Take 1 tablet by mouth daily 30 each 0     Carboxymethylcellulose Sod PF (REFRESH PLUS) 0.5 % SOLN ophthalmic solution Place 1 drop Into the left eye 3 times daily as needed (to flush debris from eye) 1 Bottle 1     famotidine (PEPCID) 20 MG tablet Take 1 tablet (20 mg) by mouth 2 times daily 60 tablet 1     oxyCODONE IR (ROXICODONE) 5 MG tablet Take 1 tablet (5 mg) by mouth every 4 hours as needed for moderate to severe pain 40 tablet 0     LORazepam (ATIVAN) 0.5 MG tablet Take 1 tablet (0.5 mg) by mouth 2 times daily as needed for anxiety 60 tablet 0     loperamide (IMODIUM A-D) 2 MG tablet 2 tab by g tube 3 times daily PRN for diarrhea 30 tablet 0     lactobacillus TABS Take 1 tablet by mouth 3 times daily       Menthol-Zinc Oxide 0.44-20.6 % OINT 1 application topically TID PRN for perianal irritation       Miconazole Nitrate 2 % POWD Apply in folds       ketotifen (ZADITOR/REFRESH ANTI-ITCH) 0.025 % SOLN ophthalmic solution Place 1 drop Into the left eye 3 times daily 1 Bottle      [DISCONTINUED] OLANZapine (ZYPREXA) 2.5 MG tablet Take 1 tablet (2.5 mg) by mouth 2 times daily 60 tablet 0                Data:   All laboratory data reviewed  Results for orders placed or performed in visit on 01/12/18 (from the past 24 hour(s))   Comprehensive metabolic panel   Result Value Ref Range    Sodium 137 133 - 144 mmol/L    Potassium 4.6 3.4 - 5.3 mmol/L    Chloride 101 94 - 109 mmol/L    Carbon Dioxide 26 20 - 32 mmol/L    Anion Gap 10 3 - 14 mmol/L    Glucose 101 (H) 70 - 99 mg/dL    Urea Nitrogen 28 7 - 30 mg/dL    Creatinine 1.06 0.66 - 1.25 mg/dL    GFR Estimate 70 >60 mL/min/1.7m2    GFR Estimate If Black 85 >60 mL/min/1.7m2    Calcium 9.7 8.5 - 10.1 mg/dL    Bilirubin Total 0.7 0.2 - 1.3 mg/dL    Albumin 2.6 (L) 3.4 - 5.0 g/dL    Protein Total 8.6 6.8 - 8.8 g/dL    Alkaline Phosphatase 106 40  - 150 U/L    ALT 70 0 - 70 U/L    AST 51 (H) 0 - 45 U/L   CBC with platelets differential   Result Value Ref Range    WBC 11.2 (H) 4.0 - 11.0 10e9/L    RBC Count 4.04 (L) 4.4 - 5.9 10e12/L    Hemoglobin 12.8 (L) 13.3 - 17.7 g/dL    Hematocrit 41.7 40.0 - 53.0 %     (H) 78 - 100 fl    MCH 31.7 26.5 - 33.0 pg    MCHC 30.7 (L) 31.5 - 36.5 g/dL    RDW 16.8 (H) 10.0 - 15.0 %    Platelet Count 398 150 - 450 10e9/L    Diff Method Automated Method     % Neutrophils 50.6 %    % Lymphocytes 15.1 %    % Monocytes 7.1 %    % Eosinophils 25.7 %    % Basophils 1.1 %    % Immature Granulocytes 0.4 %    Nucleated RBCs 0 0 /100    Absolute Neutrophil 5.7 1.6 - 8.3 10e9/L    Absolute Lymphocytes 1.7 0.8 - 5.3 10e9/L    Absolute Monocytes 0.8 0.0 - 1.3 10e9/L    Absolute Eosinophils 2.9 (H) 0.0 - 0.7 10e9/L    Absolute Basophils 0.1 0.0 - 0.2 10e9/L    Abs Immature Granulocytes 0.1 0 - 0.4 10e9/L    Absolute Nucleated RBC 0.0    TSH   Result Value Ref Range    TSH 3.20 0.40 - 4.00 mU/L       All laboratory data reviewed  Lab Results   Component Value Date    CHOL 105 04/03/2016     Lab Results   Component Value Date    HDL 25 04/03/2016     Lab Results   Component Value Date    LDL 56 04/03/2016     Lab Results   Component Value Date    TRIG 265 10/13/2017     Lab Results   Component Value Date    CHOLHDLRATIO 4.2 02/07/2012     TSH   Date Value Ref Range Status   01/12/2018 3.20 0.40 - 4.00 mU/L Final     Last Basic Metabolic Panel:  Lab Results   Component Value Date     01/12/2018      Lab Results   Component Value Date    POTASSIUM 4.6 01/12/2018     Lab Results   Component Value Date    CHLORIDE 101 01/12/2018     Lab Results   Component Value Date    IZABELLA 9.7 01/12/2018     Lab Results   Component Value Date    CO2 26 01/12/2018     Lab Results   Component Value Date    BUN 28 01/12/2018     Lab Results   Component Value Date    CR 1.06 01/12/2018     Lab Results   Component Value Date     01/12/2018     Lab  Results   Component Value Date    WBC 11.2 01/12/2018     Lab Results   Component Value Date    RBC 4.04 01/12/2018     Lab Results   Component Value Date    HGB 12.8 01/12/2018     Lab Results   Component Value Date    HCT 41.7 01/12/2018     Lab Results   Component Value Date     01/12/2018     Lab Results   Component Value Date    MCH 31.7 01/12/2018     Lab Results   Component Value Date    MCHC 30.7 01/12/2018     Lab Results   Component Value Date    RDW 16.8 01/12/2018     Lab Results   Component Value Date     01/12/2018

## 2018-01-12 NOTE — LETTER
1/12/2018    Dipak Gordillo MD  [address] [facility] [address]    RE: Cricket NARAYAN Miguel       Dear Colleague,    I had the pleasure of seeing Cricket Miguel in the [facility].    Cardiology Progress Note          Assessment and Plan:     1. 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    From a cardiac standpoint, patient is doing well.  His cardiovascular medications are appropriate.      2. Coronary artery disease status post CABG at the time of his initial valve replacement    Continue medical therapy      3. Stroke with left-sided facial droop and right-sided hemiparesis    Continue rehabilitation      4. Cardiomyopathy, valvular and ischemic    Enrollment in core clinic    This note was transcribed using electronic voice recognition software and there may be typographical errors present.                Interval History:   The patient is a 62 year old who has had a tragic couple of years.  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  He was doing fine through 2016, but then developed a stroke in 2017 and eventually effectively dehisced his ascending aorta repair requiring re-replacement during this last hospitalization.  He now has cardiomyopathy with ejection fraction around 20-30% and is very depressed at his extreme left facial droop and right hemiparesis.  He has had a very difficult couple of months.  Volume status appears even.  We are enlisting core clinic to help with his diuretic management.  He is only on a small dose of beta-blocker with good heart rates.                     Review of Systems:   Review of Systems:  Skin:  Positive for rash   Eyes:  Positive for glasses  ENT:  Negative    Respiratory:  Positive for cough  Cardiovascular:    fatigue;Positive for;edema  Gastroenterology:  "Negative    Genitourinary:  Negative    Musculoskeletal:  Negative    Neurologic:  Positive for paralysis;stroke  Psychiatric:  Positive for anxiety  Heme/Lymph/Imm:  Positive for allergies  Endocrine:  Negative                Physical Exam:     Vitals: /84 (BP Location: Left arm, Patient Position: Sitting, Cuff Size: Adult Regular)  Pulse 84  Ht 1.727 m (5' 8\")  Constitutional:  cooperative chronically ill      Skin:  warm and dry to the touch        Head:  normocephalic        Eyes:  sclera white   left lid lag and facial droop    ENT:  no pallor or cyanosis        Neck:  JVP normal        Chest:     (bilateral basilar LS diminished) clear bilaterally    Cardiac: regular rhythm                  Abdomen:      G-tube    Extremities and Back:  no deformities, clubbing, cyanosis, erythema observed        Neurological:    facial weakness;right sided weakness;tongue deviation left facial droop, right hemiparesis.             Medications:     Current Outpatient Prescriptions   Medication Sig Dispense Refill     OLANZapine (ZYPREXA) 2.5 MG tablet Take 1 tablet (2.5 mg) by mouth At Bedtime 60 tablet 0     acetaminophen (TYLENOL) 32 mg/mL solution 20.3 mLs (650 mg) by Per NG tube route every 4 hours as needed for mild pain 400 mL 1     aspirin 81 MG chewable tablet 1 tablet (81 mg) by Oral or Feeding Tube route daily 60 tablet 1     mirtazapine (REMERON) 30 MG tablet Take 1 tablet (30 mg) by mouth At Bedtime 30 tablet 1     ondansetron (ZOFRAN-ODT) 4 MG ODT tab Take 1 tablet (4 mg) by mouth every 6 hours as needed for nausea or vomiting 120 tablet 1     atorvastatin (LIPITOR) 40 MG tablet Take 1 tablet (40 mg) by mouth daily 60 tablet 1     losartan (COZAAR) 25 MG tablet Take 1 tablet (25 mg) by mouth every 12 hours 60 tablet 1     carvedilol (COREG) 6.25 MG tablet Take 1 tablet (6.25 mg) by mouth 2 times daily (with meals) 60 tablet 1     bumetanide (BUMEX) 1 MG tablet Take 1 tablet (1 mg) by mouth 2 times daily 60 " tablet 0     senna-docusate (SENOKOT-S;PERICOLACE) 8.6-50 MG per tablet Take 1-2 tablets by mouth 2 times daily as needed for constipation 100 tablet 0     potassium chloride (KAYCIEL) 20 MEQ/15ML (10%) solution Take 15 mLs (20 mEq) by mouth 2 times daily 900 mL 0     multivitamin, therapeutic with minerals (THERA-VIT-M) TABS tablet Take 1 tablet by mouth daily 30 each 0     Carboxymethylcellulose Sod PF (REFRESH PLUS) 0.5 % SOLN ophthalmic solution Place 1 drop Into the left eye 3 times daily as needed (to flush debris from eye) 1 Bottle 1     famotidine (PEPCID) 20 MG tablet Take 1 tablet (20 mg) by mouth 2 times daily 60 tablet 1     oxyCODONE IR (ROXICODONE) 5 MG tablet Take 1 tablet (5 mg) by mouth every 4 hours as needed for moderate to severe pain 40 tablet 0     LORazepam (ATIVAN) 0.5 MG tablet Take 1 tablet (0.5 mg) by mouth 2 times daily as needed for anxiety 60 tablet 0     loperamide (IMODIUM A-D) 2 MG tablet 2 tab by g tube 3 times daily PRN for diarrhea 30 tablet 0     lactobacillus TABS Take 1 tablet by mouth 3 times daily       Menthol-Zinc Oxide 0.44-20.6 % OINT 1 application topically TID PRN for perianal irritation       Miconazole Nitrate 2 % POWD Apply in folds       ketotifen (ZADITOR/REFRESH ANTI-ITCH) 0.025 % SOLN ophthalmic solution Place 1 drop Into the left eye 3 times daily 1 Bottle      [DISCONTINUED] OLANZapine (ZYPREXA) 2.5 MG tablet Take 1 tablet (2.5 mg) by mouth 2 times daily 60 tablet 0                Data:   All laboratory data reviewed  Results for orders placed or performed in visit on 01/12/18 (from the past 24 hour(s))   Comprehensive metabolic panel   Result Value Ref Range    Sodium 137 133 - 144 mmol/L    Potassium 4.6 3.4 - 5.3 mmol/L    Chloride 101 94 - 109 mmol/L    Carbon Dioxide 26 20 - 32 mmol/L    Anion Gap 10 3 - 14 mmol/L    Glucose 101 (H) 70 - 99 mg/dL    Urea Nitrogen 28 7 - 30 mg/dL    Creatinine 1.06 0.66 - 1.25 mg/dL    GFR Estimate 70 >60 mL/min/1.7m2     GFR Estimate If Black 85 >60 mL/min/1.7m2    Calcium 9.7 8.5 - 10.1 mg/dL    Bilirubin Total 0.7 0.2 - 1.3 mg/dL    Albumin 2.6 (L) 3.4 - 5.0 g/dL    Protein Total 8.6 6.8 - 8.8 g/dL    Alkaline Phosphatase 106 40 - 150 U/L    ALT 70 0 - 70 U/L    AST 51 (H) 0 - 45 U/L   CBC with platelets differential   Result Value Ref Range    WBC 11.2 (H) 4.0 - 11.0 10e9/L    RBC Count 4.04 (L) 4.4 - 5.9 10e12/L    Hemoglobin 12.8 (L) 13.3 - 17.7 g/dL    Hematocrit 41.7 40.0 - 53.0 %     (H) 78 - 100 fl    MCH 31.7 26.5 - 33.0 pg    MCHC 30.7 (L) 31.5 - 36.5 g/dL    RDW 16.8 (H) 10.0 - 15.0 %    Platelet Count 398 150 - 450 10e9/L    Diff Method Automated Method     % Neutrophils 50.6 %    % Lymphocytes 15.1 %    % Monocytes 7.1 %    % Eosinophils 25.7 %    % Basophils 1.1 %    % Immature Granulocytes 0.4 %    Nucleated RBCs 0 0 /100    Absolute Neutrophil 5.7 1.6 - 8.3 10e9/L    Absolute Lymphocytes 1.7 0.8 - 5.3 10e9/L    Absolute Monocytes 0.8 0.0 - 1.3 10e9/L    Absolute Eosinophils 2.9 (H) 0.0 - 0.7 10e9/L    Absolute Basophils 0.1 0.0 - 0.2 10e9/L    Abs Immature Granulocytes 0.1 0 - 0.4 10e9/L    Absolute Nucleated RBC 0.0    TSH   Result Value Ref Range    TSH 3.20 0.40 - 4.00 mU/L       All laboratory data reviewed  Lab Results   Component Value Date    CHOL 105 04/03/2016     Lab Results   Component Value Date    HDL 25 04/03/2016     Lab Results   Component Value Date    LDL 56 04/03/2016     Lab Results   Component Value Date    TRIG 265 10/13/2017     Lab Results   Component Value Date    CHOLHDLRATIO 4.2 02/07/2012     TSH   Date Value Ref Range Status   01/12/2018 3.20 0.40 - 4.00 mU/L Final     Last Basic Metabolic Panel:  Lab Results   Component Value Date     01/12/2018      Lab Results   Component Value Date    POTASSIUM 4.6 01/12/2018     Lab Results   Component Value Date    CHLORIDE 101 01/12/2018     Lab Results   Component Value Date    IZABELLA 9.7 01/12/2018     Lab Results   Component Value  Date    CO2 26 01/12/2018     Lab Results   Component Value Date    BUN 28 01/12/2018     Lab Results   Component Value Date    CR 1.06 01/12/2018     Lab Results   Component Value Date     01/12/2018     Lab Results   Component Value Date    WBC 11.2 01/12/2018     Lab Results   Component Value Date    RBC 4.04 01/12/2018     Lab Results   Component Value Date    HGB 12.8 01/12/2018     Lab Results   Component Value Date    HCT 41.7 01/12/2018     Lab Results   Component Value Date     01/12/2018     Lab Results   Component Value Date    MCH 31.7 01/12/2018     Lab Results   Component Value Date    MCHC 30.7 01/12/2018     Lab Results   Component Value Date    RDW 16.8 01/12/2018     Lab Results   Component Value Date     01/12/2018       Thank you for allowing me to participate in the care of your patient.      Sincerely,     Alfredito Garzon MD     [facility]    cc:   Shakila Browne MD  [address]  [place] [address]

## 2018-01-12 NOTE — MR AVS SNAPSHOT
After Visit Summary   1/12/2018    Cricket Miguel    MRN: [id number]           Patient Information     Date Of Birth          1955        Visit Information        Provider Department      1/12/2018 12:45 PM Alfredito Garzon MD [facility]        Today's Diagnoses     Ischemic cardiomyopathy    -  1    Cerebrovascular accident (CVA) due to other mechanism (H)        Anxiety        Delirium due to another medical condition           Follow-ups after your visit        Additional Services     Enrollment in [employer]           Follow-Up with [facility] visit                 Your next 10 appointments already scheduled     Jan 19, 2018  1:30 PM CST   (Arrive by 1:15 PM)   Return Visit with  CVTS   [facility] ([facility])    [address]  [address]  [place] [address] [phone]            Jan 23, 2018  1:50 PM CST   CORE Enrollment with BIBI Bettencourt CNP   [facility] ([facility])    [address]  [place] [address] [phone]              Future tests that were ordered for you today     Open Future Orders        Priority Expected Expires Ordered    Follow-Up with [facility] visit Routine 1/26/2018 1/12/2019 1/12/2018            Who to contact     If you have questions or need follow up information about today's clinic visit or your schedule please contact [facility] directly at [phone].  Normal or non-critical lab and imaging results will be communicated to you by MyChart, letter or phone within 4 business days after the clinic has received the results. If you do not hear from us within 7 days, please contact the clinic through MyChart or phone. If you have a critical or abnormal lab result, we will notify you by phone as soon as possible.  Submit  "refill requests through [employer] or call your pharmacy and they will forward the refill request to us. Please allow 3 business days for your refill to be completed.          Additional Information About Your Visit        [employer] Information     [employer] lets you send messages to your doctor, view your test results, renew your prescriptions, schedule appointments and more. To sign up, go to [email or web address] . Click on \"Log in\" on the left side of the screen, which will take you to the Welcome page. Then click on \"Sign up Now\" on the right side of the page.     You will be asked to enter the access code listed below, as well as some personal information. Please follow the directions to create your username and password.     Your access code is: [email or web address]  Expires: 2018  7:55 AM     Your access code will  in 90 days. If you need help or a new code, please call your [facility] clinic or [phone].        Care EveryWhere ID     This is your Care EveryWhere ID. This could be used by other organizations to access your [facility] medical records  [id number]        Your Vitals Were     Pulse Height                84 1.727 m (5' 8\")           Blood Pressure from Last 3 Encounters:   18 116/84   18 125/81   18 (!) 135/92    Weight from Last 3 Encounters:   18 78.1 kg (172 lb 3.2 oz)   18 78.7 kg (173 lb 8 oz)   18 74.9 kg (165 lb 2 oz)              We Performed the Following     Enrollment in [employer]          Today's Medication Changes          These changes are accurate as of: 18  1:17 PM.  If you have any questions, ask your nurse or doctor.               These medicines have changed or have updated prescriptions.        Dose/Directions    OLANZapine 2.5 MG tablet   Commonly known as:  zyPREXA   This may have changed:  when to take this   Used for:  Cerebrovascular accident (CVA) due to other mechanism (H), Anxiety, Delirium due to another medical condition "   Changed by:  Alfredito Garzon MD        Dose:  2.5 mg   Take 1 tablet (2.5 mg) by mouth At Bedtime   Quantity:  60 tablet   Refills:  0                Primary Care Provider Office Phone # Fax #    Dipak Gordillo [phone]       [address] [place] [id number]        Equal Access to Services     [facility]: Hadii aad ku hadasho Soomaali, waaxda luqadaha, qaybta kaalmada adeegyada, waxay idiin hayaan adeeg cordellshiraniles laPualaan . So [facility] [phone].    ATENCIÓN: Si habla español, tiene a mendiola disposición servicios gratuitos de asistencia lingüística. Llame al [phone].    We comply with applicable federal civil rights laws and [place] laws. We do not discriminate on the basis of race, color, national origin, age, disability, sex, sexual orientation, or gender identity.            Thank you!     Thank you for choosing [facility]  for your care. Our goal is always to provide you with excellent care. Hearing back from our patients is one way we can continue to improve our services. Please take a few minutes to complete the written survey that you may receive in the mail after your visit with us. Thank you!             Your Updated Medication List - Protect others around you: Learn how to safely use, store and throw away your medicines at www.disposemymeds.org.          This list is accurate as of: 1/12/18  1:17 PM.  Always use your most recent med list.                   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, Cerebrovascular accident (CVA) due to other mechanism (H)       atorvastatin 40 MG  tablet    LIPITOR    60 tablet    Take 1 tablet (40 mg) by mouth daily    Cerebrovascular accident (CVA) due to other mechanism (H)       bumetanide 1 MG tablet    BUMEX    60 tablet    Take 1 tablet (1 mg) by mouth 2 times daily    Cardiomyopathy, unspecified type (H)       Carboxymethylcellulose Sod PF 0.5 % Soln ophthalmic solution    REFRESH PLUS    1 Bottle    Place 1 drop Into the left eye 3 times daily as needed (to flush debris from eye)    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       carvedilol 6.25 MG tablet    COREG    60 tablet    Take 1 tablet (6.25 mg) by mouth 2 times daily (with meals)    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, Hypertension goal BP (blood pressure) < 140/90       famotidine 20 MG tablet    PEPCID    60 tablet    Take 1 tablet (20 mg) by mouth 2 times daily    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       ketotifen 0.025 % Soln ophthalmic solution    ZADITOR/REFRESH ANTI-ITCH    1 Bottle    Place 1 drop Into the left eye 3 times daily        lactobacillus Tabs      Take 1 tablet by mouth 3 times daily        loperamide 2 MG tablet    IMODIUM A-D    30 tablet    2 tab by g tube 3 times daily PRN for diarrhea        LORazepam 0.5 MG tablet    ATIVAN    60 tablet    Take 1 tablet (0.5 mg) by mouth 2 times daily as needed for anxiety    Anxiety       losartan 25 MG tablet    COZAAR    60 tablet    Take 1 tablet (25 mg) by mouth every 12 hours    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, Hypertension goal BP (blood pressure) < 140/90       Menthol-Zinc Oxide 0.44-20.6 % Oint      1 application topically TID PRN for perianal irritation        Miconazole Nitrate 2 % Powd      Apply in folds        mirtazapine 30 MG tablet    REMERON    30 tablet    Take 1 tablet (30 mg) by mouth At Bedtime    Depression, unspecified depression type       multivitamin, therapeutic  with minerals Tabs tablet     30 each    Take 1 tablet by mouth daily    Cerebrovascular accident (CVA) due to other mechanism (H)       OLANZapine 2.5 MG tablet    zyPREXA    60 tablet    Take 1 tablet (2.5 mg) by mouth At Bedtime    Cerebrovascular accident (CVA) due to other mechanism (H), Anxiety, Delirium due to another medical condition       ondansetron 4 MG ODT tab    ZOFRAN-ODT    120 tablet    Take 1 tablet (4 mg) by mouth every 6 hours as needed for nausea or vomiting    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       oxyCODONE IR 5 MG tablet    ROXICODONE    40 tablet    Take 1 tablet (5 mg) by mouth every 4 hours as needed for moderate to severe pain    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, Acute post-operative pain       potassium chloride 20 MEQ/15ML (10%) solution    KAYCIEL    900 mL    Take 15 mLs (20 mEq) by mouth 2 times daily    Cardiomyopathy, unspecified type (H)       senna-docusate 8.6-50 MG per tablet    SENOKOT-S;PERICOLACE    100 tablet    Take 1-2 tablets by mouth 2 times daily as needed for constipation    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

## 2018-01-15 ENCOUNTER — CARE COORDINATION (OUTPATIENT)
Dept: CARDIOLOGY | Facility: CLINIC | Age: 63
End: 2018-01-15

## 2018-01-15 NOTE — PROGRESS NOTES
Patient's SO Meghna called to ask if the patient's feeding tube can be pulled when he is in clinic on Friday to see the PAs for surgery follow up.  This writer spoke to RONEN Mike and he said they can pull the tube but he would like to see a diary of the patient's caloric intake and if he has had any diarrhea since leaving the hospital.  Called Meghna and left a voicemail message for her and asked she call back with any questions or concerns.

## 2018-01-15 NOTE — PROGRESS NOTES
Patient's SO called back and was instructed that with the proper caloric intake and bowel movements the G-tube could be removed on Friday at is surgery follow up appointment.  Meghna states she will have the TCU fax the reports to this writer.  She states patient is eating well and swallowing all his medications without problems.  PA notified.

## 2018-01-16 ENCOUNTER — NURSING HOME VISIT (OUTPATIENT)
Dept: GERIATRICS | Facility: CLINIC | Age: 63
End: 2018-01-16
Payer: COMMERCIAL

## 2018-01-16 VITALS
DIASTOLIC BLOOD PRESSURE: 76 MMHG | OXYGEN SATURATION: 95 % | HEART RATE: 87 BPM | HEIGHT: 68 IN | TEMPERATURE: 97.7 F | RESPIRATION RATE: 16 BRPM | SYSTOLIC BLOOD PRESSURE: 110 MMHG | BODY MASS INDEX: 25.64 KG/M2 | WEIGHT: 169.2 LBS

## 2018-01-16 DIAGNOSIS — F32.1 MAJOR DEPRESSIVE DISORDER, SINGLE EPISODE, MODERATE (H): ICD-10-CM

## 2018-01-16 DIAGNOSIS — J44.9 COPD, MILD (H): ICD-10-CM

## 2018-01-16 DIAGNOSIS — I10 HYPERTENSION GOAL BP (BLOOD PRESSURE) < 140/90: ICD-10-CM

## 2018-01-16 DIAGNOSIS — I50.9 CONGESTIVE HEART FAILURE, NYHA CLASS 2, UNSPECIFIED CONGESTIVE HEART FAILURE TYPE (H): ICD-10-CM

## 2018-01-16 DIAGNOSIS — F17.200 TOBACCO USE DISORDER: ICD-10-CM

## 2018-01-16 DIAGNOSIS — I77.810 AORTIC ROOT DILATATION (H): ICD-10-CM

## 2018-01-16 DIAGNOSIS — H02.105 ECTROPION OF LEFT LOWER EYELID, UNSPECIFIED ECTROPION TYPE: ICD-10-CM

## 2018-01-16 DIAGNOSIS — I39 ENDOCARDITIS AND HEART VALVE DISORDERS IN DISEASES CLASSIFIED ELSEWHERE: Primary | ICD-10-CM

## 2018-01-16 DIAGNOSIS — Z93.1 GASTROSTOMY TUBE IN PLACE (H): ICD-10-CM

## 2018-01-16 DIAGNOSIS — L30.9 DERMATITIS: ICD-10-CM

## 2018-01-16 DIAGNOSIS — I63.9 CEREBROVASCULAR ACCIDENT (CVA), UNSPECIFIED MECHANISM (H): ICD-10-CM

## 2018-01-16 DIAGNOSIS — F41.9 ANXIETY: ICD-10-CM

## 2018-01-16 DIAGNOSIS — R53.81 PHYSICAL DECONDITIONING: ICD-10-CM

## 2018-01-16 DIAGNOSIS — R13.12 OROPHARYNGEAL DYSPHAGIA: ICD-10-CM

## 2018-01-16 DIAGNOSIS — H01.005 BLEPHARITIS OF LEFT LOWER EYELID, UNSPECIFIED TYPE: ICD-10-CM

## 2018-01-16 LAB
FUNGUS SPEC CULT: NORMAL
Lab: 1
Lab: 2
Lab: 3
SPECIMEN SOURCE: NORMAL

## 2018-01-16 PROCEDURE — 99310 SBSQ NF CARE HIGH MDM 45: CPT | Performed by: NURSE PRACTITIONER

## 2018-01-16 RX ORDER — BENZOCAINE/MENTHOL 6 MG-10 MG
LOZENGE MUCOUS MEMBRANE 2 TIMES DAILY
COMMUNITY
Start: 2018-01-16 | End: 2018-01-23

## 2018-01-16 NOTE — PROGRESS NOTES
[facility]    Chief Complaint   Patient presents with     RECHECK       HPI:    Cricket Miguel is a 62 year old  (1955), who is being seen today for an episodic care visit at [facility].  HPI information obtained from: facility chart records, facility staff, patient report and [facility] chart review.    Today's concern is:  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  Aortic root dilatation (H)  Patient is s/p redo sternotomy, femoral arterial and venous cannulation for cardiopulmonary bypass, aortic root and aortic valve replacement with a 26 mm [employer] valve conduit homograft, intraoperative OWEN. Now completed antibiotics and PICC line removed. No fevers. Due for f/u with vascular team on 1/19/18. Also saw cardiology 1/12 for follow up. No chest pain, no dyspnea.   Lab Results   Component Value Date    WBC 11.2 01/12/2018    WBC 7.2 01/09/2018       Cerebrovascular accident (CVA), unspecified mechanism (H)  Oropharyngeal dysphagia  Gastrostomy tube in place (H)  Patient s/p multiple strokes. He is in a wheelchair, weakness is generalized. Now back to eating orally and significant other reports feeding tube to be removed next week. On ASA daily.     Congestive heart failure, NYHA class 2, unspecified congestive heart failure type (H)  Hypertension goal BP (blood pressure) < 140/90  Patient with stable weight, no s/s edema. -140 daily. Managed with Bumex, Coreg, Cozaar, KCL, Lipitor, asa.     COPD, mild (H)  Tobacco use disorder  No respiratory symptoms. 95% sats on room air.     Major depressive disorder, single episode, moderate (HCC)  Anxiety  Patient is now down to Zyprexa 2.5 mg PO HS and continues Remeron. Frustrated with slow progress per significant other but otherwise no concerns with anxiety or depression.     Blepharitis of left lower eyelid, unspecified type  Ectropion of left lower eyelid, unspecified ectropion  type  To see ophthalmology today for further management. Left eye does not close, redness and irritation persists.     Dermatitis  Family note redness, itching bilateral thighs - appears to have a very faint red rash vs excoriation. Will start short there HC cream, monitor.     Physical deconditioning  Ongoing issues - therapies continue. In wheelchair, needs lift for transfers. Making slow gains per family.     ALLERGIES: Lisinopril  Past Medical, Surgical, Family and Social History reviewed and updated in [facility].    Current Outpatient Prescriptions   Medication Sig Dispense Refill     Oseltamivir Phosphate (TAMIFLU PO) Take 75 mg by mouth daily       hydrocortisone (CORTAID) 1 % cream Apply topically 2 times daily       OLANZapine (ZYPREXA) 2.5 MG tablet Take 1 tablet (2.5 mg) by mouth At Bedtime 60 tablet 0     acetaminophen (TYLENOL) 32 mg/mL solution 20.3 mLs (650 mg) by Per NG tube route every 4 hours as needed for mild pain 400 mL 1     aspirin 81 MG chewable tablet 1 tablet (81 mg) by Oral or Feeding Tube route daily 60 tablet 1     mirtazapine (REMERON) 30 MG tablet Take 1 tablet (30 mg) by mouth At Bedtime 30 tablet 1     ondansetron (ZOFRAN-ODT) 4 MG ODT tab Take 1 tablet (4 mg) by mouth every 6 hours as needed for nausea or vomiting 120 tablet 1     atorvastatin (LIPITOR) 40 MG tablet Take 1 tablet (40 mg) by mouth daily 60 tablet 1     losartan (COZAAR) 25 MG tablet Take 1 tablet (25 mg) by mouth every 12 hours 60 tablet 1     carvedilol (COREG) 6.25 MG tablet Take 1 tablet (6.25 mg) by mouth 2 times daily (with meals) 60 tablet 1     bumetanide (BUMEX) 1 MG tablet Take 1 tablet (1 mg) by mouth 2 times daily 60 tablet 0     senna-docusate (SENOKOT-S;PERICOLACE) 8.6-50 MG per tablet Take 1-2 tablets by mouth 2 times daily as needed for constipation (Patient taking differently: Take 1 tablet by mouth 2 times daily as needed for constipation ) 100 tablet 0     potassium chloride (KAYCIEL) 20 MEQ/15ML (10%)  "solution Take 15 mLs (20 mEq) by mouth 2 times daily 900 mL 0     multivitamin, therapeutic with minerals (THERA-VIT-M) TABS tablet Take 1 tablet by mouth daily 30 each 0     Carboxymethylcellulose Sod PF (REFRESH PLUS) 0.5 % SOLN ophthalmic solution Place 1 drop Into the left eye 3 times daily as needed (to flush debris from eye) 1 Bottle 1     famotidine (PEPCID) 20 MG tablet Take 1 tablet (20 mg) by mouth 2 times daily 60 tablet 1     oxyCODONE IR (ROXICODONE) 5 MG tablet Take 1 tablet (5 mg) by mouth every 4 hours as needed for moderate to severe pain 40 tablet 0     LORazepam (ATIVAN) 0.5 MG tablet Take 1 tablet (0.5 mg) by mouth 2 times daily as needed for anxiety 60 tablet 0     loperamide (IMODIUM A-D) 2 MG tablet 2 tab by g tube 3 times daily PRN for diarrhea 30 tablet 0     lactobacillus TABS Take 1 tablet by mouth 3 times daily       Menthol-Zinc Oxide 0.44-20.6 % OINT 1 application topically TID PRN for perianal irritation       Miconazole Nitrate 2 % POWD Apply in folds       ketotifen (ZADITOR/REFRESH ANTI-ITCH) 0.025 % SOLN ophthalmic solution Place 1 drop Into the left eye 3 times daily 1 Bottle      Medications reviewed:  Medications reconciled to facility chart and changes were made to reflect current medications as identified as above med list. Below are the changes that were made:   Medications stopped since last EPIC medication reconciliation:   There are no discontinued medications.    Medications started since last [facility] medication reconciliation:  Orders Placed This Encounter   Medications     Oseltamivir Phosphate (TAMIFLU PO)     Sig: Take 75 mg by mouth daily       REVIEW OF SYSTEMS:  Unobtainable secondary to cognitive impairment or aphasia, but today pt reports no pain, no dyspnea.     Physical Exam:  /76  Pulse 87  Temp 97.7  F (36.5  C)  Resp 16  Ht 5' 8\" (1.727 m)  Wt 169 lb 3.2 oz (76.7 kg)  SpO2 95%  BMI 25.73 kg/m2  GENERAL APPEARANCE:  Alert, in no distress, pleasant, " cooperative, oriented x self and significant other  EYES:  Right eye EOM, lid normal, sclera clear and conjunctiva normal, no discharge or mattering on lids or lashes noted. Left eye unable to close, redness of sclera and conjunctiva  ENT:  Mouth normal, moist mucous membranes, nose normal without drainage or crusting, external ears without lesions, hearing acuity intact  NECK:  Nontender, supple, symmetrical; no adenopathy, masses or thyromegaly, trachea midline  RESP:  respiratory effort and palpation of chest normal, no chest wall tenderness, no respiratory distress, Lung sounds clear, patient is on room air  CV:  Palpation and auscultation of heart done, rate and rhythm controlled and regular, no murmur, no rub or gallop. Edema none bilateral lower extremities.   ABDOMEN:  normal bowel sounds, soft, nontender, no grimacing or guarding with palpation, no hepatosplenomegaly or other masses. Feeding tube in place, locked  M/S:   Gait and station wheelchair bound, Digits and nails normal, no tenderness or swelling of the joints; generalized weakness all extremities  SKIN:  Inspection and palpation of skin and subcutaneous tissue: skin on extremities warm, dry and intact. Flat red rash/excoriation bilateral thighs  NEURO: cranial nerves 2-12 grossly intact, left facial droop and unable to close left eye, no speech deficits and able to follow directions, moves all extremities but generalized weakness  PSYCH:  insight and judgement impaired, memory impaired, affect and mood flat    Recent Labs:     CBC RESULTS:   Recent Labs   Lab Test  01/12/18   1134  01/09/18   0615   WBC  11.2*  7.2   RBC  4.04*  3.55*   HGB  12.8*  11.3*   HCT  41.7  35.8*   MCV  103*  101*   MCH  31.7  31.8   MCHC  30.7*  31.6   RDW  16.8*  18.2*   PLT  398  430       Last Basic Metabolic Panel:  Recent Labs   Lab Test  01/12/18   1134  01/02/18   0701   NA  137  141   POTASSIUM  4.6  4.1   CHLORIDE  101  109   IZABELLA  9.7  9.0   CO2  26  26   BUN   28  27   CR  1.06  0.94   GLC  101*  113*       Liver Function Studies -   Recent Labs   Lab Test  01/12/18   1134  12/18/17   0359   PROTTOTAL  8.6  7.0   ALBUMIN  2.6*  1.5*   BILITOTAL  0.7  1.3   ALKPHOS  106  92   AST  51*  32   ALT  70  30       TSH   Date Value Ref Range Status   01/12/2018 3.20 0.40 - 4.00 mU/L Final   04/03/2016 0.945 mcU/mL Final       Lab Results   Component Value Date    A1C 5.3 12/19/2017    A1C 6.4 10/07/2017       Assessment/Plan:  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  Completed course of antibiotics, asymptomatic. Last WBC minimally elevated. Repeat CBC this week and f/u with results.     Aortic root dilatation (H)  S/p surgery. F/U with vascular team on 1/19     Cerebrovascular accident (CVA), unspecified mechanism (H)  Oropharyngeal dysphagia  Gastrostomy tube in place (H)  Chronic issues. Meds as above. Able to eat orally - plan to remove feeding tube 1/19. Monitor.     Congestive heart failure, NYHA class 2, unspecified congestive heart failure type (H)  Hypertension goal BP (blood pressure) < 140/90  Chronic issues. Stable, well controlled meds as above, vs and wt per routine. BMP on 1/18    COPD, mild (H)  Tobacco use disorder  By history, currently asymptomatic. Monitor.     Major depressive disorder, single episode, moderate (HCC)  Anxiety  Chronic issues, doing well. Monitor. Meds as above.     Blepharitis of left lower eyelid, unspecified type  Ectropion of left lower eyelid, unspecified ectropion type  Chronic issue - to see eye specialist today. F/U with their recommendations.     Dermatitis  New concerns. Start HC 1% and Staff to update provider if not effective.     Physical deconditioning  Ongoing issues; therapies, f/u with progress next week.       Orders:  1. Change Senna S to 1 tab PO BID PRN constipation  2. HC 1% cream to rash on thighs BID x 7 days. May keep at bedside and family may administer.   3. Heck BMP, CBC on 1/18/18  diagnosis CHF, anemia    Total time spent with patient visit at the skilled nursing facility was 35 min including patient visit and review of past records. Greater than 50% of total time spent with counseling and coordinating care due to review of history, current satus and POC r/t above issues    Electronically signed by  BIBI Simmons CNP

## 2018-01-16 NOTE — MR AVS SNAPSHOT
After Visit Summary   1/16/2018    Cricket Miguel    MRN: [id number]           Patient Information     Date Of Birth          1955        Visit Information        Provider Department      1/16/2018 9:00 AM July Davis APRN CNP Geriatrics Transitional [facility]        Today's Diagnoses     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    -  1    Aortic root dilatation (H)        Cerebrovascular accident (CVA), unspecified mechanism (H)        Oropharyngeal dysphagia        Gastrostomy tube in place (H)        Congestive heart failure, NYHA class 2, unspecified congestive heart failure type (H)        Hypertension goal BP (blood pressure) < 140/90        COPD, mild (H)        Tobacco use disorder        Major depressive disorder, single episode, moderate (HCC)        Anxiety        Blepharitis of left lower eyelid, unspecified type        Ectropion of left lower eyelid, unspecified ectropion type        Dermatitis        Physical deconditioning           Follow-ups after your visit        Your next 10 appointments already scheduled     Jan 19, 2018  1:30 PM CST   (Arrive by 1:15 PM)   [facility]   [facility] ([facility])    [address]  [address] [place] [address] [phone]            Jan 23, 2018  1:50 PM CST   CORE Enrollment with BIBI Bettencourt CNP   [facility] ([facility])    [address]  [facility] [address] [phone]              Who to contact     If you have questions or need follow up information about today's clinic visit or your schedule please contact [facility]AT[facility] directly at [phone].  Normal or non-critical lab and imaging results will be communicated to you by MyChart, letter or phone within 4 business days after the clinic has received the results. If you do not hear from us  "within 7 days, please contact the clinic through [employer] or phone. If you have a critical or abnormal lab result, we will notify you by phone as soon as possible.  Submit refill requests through [employer] or call your pharmacy and they will forward the refill request to us. Please allow 3 business days for your refill to be completed.          Additional Information About Your Visit        Tiny [employer] Information     [employer] lets you send messages to your doctor, view your test results, renew your prescriptions, schedule appointments and more. To sign up, go to [email or web address] . Click on \"Log in\" on the left side of the screen, which will take you to the Welcome page. Then click on \"Sign up Now\" on the right side of the page.     You will be asked to enter the access code listed below, as well as some personal information. Please follow the directions to create your username and password.     Your access code is: [email or web address]  Expires: 2018  7:55 AM     Your access code will  in 90 days. If you need help or a new code, please call your [facility] clinic or [phone].        Care EveryWhere ID     This is your Care EveryWhere ID. This could be used by other organizations to access your [facility] medical records  [id number]        Your Vitals Were     Pulse Temperature Respirations Height Pulse Oximetry BMI (Body Mass Index)    87 97.7  F (36.5  C) 16 5' 8\" (1.727 m) 95% 25.73 kg/m2       Blood Pressure from Last 3 Encounters:   18 110/76   18 116/84   18 125/81    Weight from Last 3 Encounters:   18 169 lb 3.2 oz (76.7 kg)   18 172 lb 3.2 oz (78.1 kg)   18 173 lb 8 oz (78.7 kg)              Today, you had the following     No orders found for display         Today's Medication Changes          These changes are accurate as of: 18 12:03 PM.  If you have any questions, ask your nurse or doctor.               These medicines have changed or have updated " prescriptions.        Dose/Directions    senna-docusate 8.6-50 MG per tablet   Commonly known as:  SENOKOT-S;PERICOLACE   This may have changed:  how much to take   Used for:  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        Dose:  1-2 tablet   Take 1-2 tablets by mouth 2 times daily as needed for constipation   Quantity:  100 tablet   Refills:  0                Primary Care Provider Office Phone # Fax #    Dipak Gordillo [phone]       [address] [place] [address]        Equal Access to Services     [facility]: Hadii aad ku hadasho Soomaali, waaxda luqadaha, qaybta kaalmada adeegyada, waxjuan rodríguez . So [facility] [phone].    ATENCIÓN: Si habla español, tiene a mendiola disposición servicios gratuitos de asistencia lingüística. LlJoint [facility] [phone].    We comply with applicable federal civil rights laws and [place] laws. We do not discriminate on the basis of race, color, national origin, age, disability, sex, sexual orientation, or gender identity.            Thank you!     Thank you for choosing [facility]AT[facility]  for your care. Our goal is always to provide you with excellent care. Hearing back from our patients is one way we can continue to improve our services. Please take a few minutes to complete the written survey that you may receive in the mail after your visit with us. Thank you!             Your Updated Medication List - Protect others around you: Learn how to safely use, store and throw away your medicines at www.disposemymeds.org.          This list is accurate as of: 1/16/18 12:03 PM.  Always use your most recent med list.                   Brand Name Dispense Instructions for use Diagnosis    acetaminophen 32 mg/mL solution    TYLENOL    400 mL    20.3 mLs (650 mg) by Per NG tube route every 4 hours as needed for mild pain    Acute post-operative pain       aspirin 81 MG chewable tablet     60 tablet     1 tablet (81 mg) by Oral or Feeding Tube route daily    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, Cerebrovascular accident (CVA) due to other mechanism (H)       atorvastatin 40 MG tablet    LIPITOR    60 tablet    Take 1 tablet (40 mg) by mouth daily    Cerebrovascular accident (CVA) due to other mechanism (H)       bumetanide 1 MG tablet    BUMEX    60 tablet    Take 1 tablet (1 mg) by mouth 2 times daily    Cardiomyopathy, unspecified type (H)       Carboxymethylcellulose Sod PF 0.5 % Soln ophthalmic solution    REFRESH PLUS    1 Bottle    Place 1 drop Into the left eye 3 times daily as needed (to flush debris from eye)    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       carvedilol 6.25 MG tablet    COREG    60 tablet    Take 1 tablet (6.25 mg) by mouth 2 times daily (with meals)    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, Hypertension goal BP (blood pressure) < 140/90       famotidine 20 MG tablet    PEPCID    60 tablet    Take 1 tablet (20 mg) by mouth 2 times daily    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       hydrocortisone 1 % cream    CORTAID     Apply topically 2 times daily        ketotifen 0.025 % Soln ophthalmic solution    ZADITOR/REFRESH ANTI-ITCH    1 Bottle    Place 1 drop Into the left eye 3 times daily        lactobacillus Tabs      Take 1 tablet by mouth 3 times daily        loperamide 2 MG tablet    IMODIUM A-D    30 tablet    2 tab by g tube 3 times daily PRN for diarrhea        LORazepam 0.5 MG tablet    ATIVAN    60 tablet    Take 1 tablet (0.5 mg) by mouth 2 times daily as needed for anxiety    Anxiety       losartan 25 MG tablet    COZAAR    60 tablet    Take 1 tablet (25 mg) by mouth every 12 hours    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, Hypertension goal BP (blood pressure) < 140/90       Menthol-Zinc Oxide 0.44-20.6 % Oint       1 application topically TID PRN for perianal irritation        Miconazole Nitrate 2 % Powd      Apply in folds        mirtazapine 30 MG tablet    REMERON    30 tablet    Take 1 tablet (30 mg) by mouth At Bedtime    Depression, unspecified depression type       multivitamin, therapeutic with minerals Tabs tablet     30 each    Take 1 tablet by mouth daily    Cerebrovascular accident (CVA) due to other mechanism (H)       OLANZapine 2.5 MG tablet    zyPREXA    60 tablet    Take 1 tablet (2.5 mg) by mouth At Bedtime    Cerebrovascular accident (CVA) due to other mechanism (H), Anxiety, Delirium due to another medical condition       ondansetron 4 MG ODT tab    ZOFRAN-ODT    120 tablet    Take 1 tablet (4 mg) by mouth every 6 hours as needed for nausea or vomiting    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       oxyCODONE IR 5 MG tablet    ROXICODONE    40 tablet    Take 1 tablet (5 mg) by mouth every 4 hours as needed for moderate to severe pain    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

## 2018-01-18 ENCOUNTER — HOSPITAL LABORATORY (OUTPATIENT)
Dept: OTHER | Facility: CLINIC | Age: 63
End: 2018-01-18

## 2018-01-18 LAB
ANION GAP SERPL CALCULATED.3IONS-SCNC: 9 MMOL/L (ref 3–14)
BUN SERPL-MCNC: 26 MG/DL (ref 7–30)
CALCIUM SERPL-MCNC: 9.3 MG/DL (ref 8.5–10.1)
CHLORIDE SERPL-SCNC: 101 MMOL/L (ref 94–109)
CO2 SERPL-SCNC: 30 MMOL/L (ref 20–32)
CREAT SERPL-MCNC: 1.17 MG/DL (ref 0.66–1.25)
ERYTHROCYTE [DISTWIDTH] IN BLOOD BY AUTOMATED COUNT: 15.3 % (ref 10–15)
GFR SERPL CREATININE-BSD FRML MDRD: 63 ML/MIN/1.7M2
GLUCOSE SERPL-MCNC: 83 MG/DL (ref 70–99)
HCT VFR BLD AUTO: 36.5 % (ref 40–53)
HGB BLD-MCNC: 11.5 G/DL (ref 13.3–17.7)
MCH RBC QN AUTO: 31.7 PG (ref 26.5–33)
MCHC RBC AUTO-ENTMCNC: 31.5 G/DL (ref 31.5–36.5)
MCV RBC AUTO: 101 FL (ref 78–100)
PLATELET # BLD AUTO: 246 10E9/L (ref 150–450)
POTASSIUM SERPL-SCNC: 4.2 MMOL/L (ref 3.4–5.3)
RBC # BLD AUTO: 3.63 10E12/L (ref 4.4–5.9)
SODIUM SERPL-SCNC: 140 MMOL/L (ref 133–144)
WBC # BLD AUTO: 7.3 10E9/L (ref 4–11)

## 2018-01-18 NOTE — TELEPHONE ENCOUNTER
Patient still admitted to TCU.  Waiting call back from family.    Merary Kiran, BS, RN, PHN  [facility]  Ph) [phone]

## 2018-01-19 ENCOUNTER — OFFICE VISIT (OUTPATIENT)
Dept: CARDIOLOGY | Facility: CLINIC | Age: 63
End: 2018-01-19
Attending: THORACIC SURGERY (CARDIOTHORACIC VASCULAR SURGERY)
Payer: COMMERCIAL

## 2018-01-19 ENCOUNTER — TELEPHONE (OUTPATIENT)
Dept: FAMILY MEDICINE | Facility: CLINIC | Age: 63
End: 2018-01-19

## 2018-01-19 VITALS — HEART RATE: 69 BPM | OXYGEN SATURATION: 94 % | SYSTOLIC BLOOD PRESSURE: 107 MMHG | DIASTOLIC BLOOD PRESSURE: 79 MMHG

## 2018-01-19 DIAGNOSIS — I39 ENDOCARDITIS AND HEART VALVE DISORDERS IN DISEASES CLASSIFIED ELSEWHERE: Primary | ICD-10-CM

## 2018-01-19 DIAGNOSIS — Z95.2 S/P AVR (AORTIC VALVE REPLACEMENT): ICD-10-CM

## 2018-01-19 DIAGNOSIS — Z53.9 DIAGNOSIS NOT YET DEFINED: Primary | ICD-10-CM

## 2018-01-19 DIAGNOSIS — Z98.890 H/O AORTIC ROOT REPAIR: ICD-10-CM

## 2018-01-19 PROCEDURE — G0463 HOSPITAL OUTPT CLINIC VISIT: HCPCS | Mod: ZF

## 2018-01-19 PROCEDURE — G0180 MD CERTIFICATION HHA PATIENT: HCPCS | Performed by: FAMILY MEDICINE

## 2018-01-19 ASSESSMENT — PAIN SCALES - GENERAL: PAINLEVEL: NO PAIN (0)

## 2018-01-19 NOTE — LETTER
1/19/2018      RE: Cricket Miguel  [address] [place] [address]       Dear Colleague,    Thank you for the opportunity to participate in the care of your patient, Cricket Miguel, at the [facility]. Please see a copy of my visit note below.    CARDIOTHORACIC SURGERY FOLLOW-UP VISIT  1/19/2018     Cricket Miguel   1955   [id number]      Reason for visit: Post-Op aortic root and aortic valve replacement with homograft with Dr. Bowers on 1/7/2018     HPI: Cricket Miguel is a 62 year old year old male seen in clinic for a routine follow-up appointment after surgery. Patient has past medical history of aortic root pseudoaneurysm, MSSA aortic valve endocarditis, chronic systolic heart failure, history of CVA, hypertension, dyslipidemia. Hospital course was remarkable for post-operative delirium.    Patient has been doing well since discharge and now returns to clinic for postop visit.    Significant other and patient report patient is making small progress neurologically gaining strength in the upper extremity. Patient denies any fever, chills, chest pain, palpitations, edema, SOB, lightheadedness and nausea.    Patient reports incision is healed well.  Normal bowel movements. He has been eating adequate amounts of calories and protein with supporting documentation today in clinic. Voiding without problems.    Patient is not on Coumadin.  Weight stable.    Blood glucose levels have been under good control.      PAST MEDICAL HISTORY:  Past Medical History:   Diagnosis Date     Abscess of aortic root 09/2017     AI (aortic insufficiency)     severe     Anxiety      Aortic root dilatation (H)      AR (aortic regurgitation)     severe     Cardiomyopathy (H)      CHF (congestive heart failure), NYHA class II (H)      COPD, mild (H)     spirometry 12/16     CVA (cerebral vascular accident) (H) 09/2017    septic emboli - MSSA - cerebellum,  Left MCA, Rt Occipital, Aphasia, Left visual field cut, moderate to severe encephalopathy     Depression 09/2017     Heart murmur      Hyperlipidemia LDL goal <70 10/31/2010     Hypertension goal BP (blood pressure) < 140/90      Inguinal hernia      left lung nodule  1/29/2008     Major depressive disorder, single episode, moderate (H)      Psoriasis childhood     Tobacco use disorder        PAST SURGICAL HISTORY:  Past Surgical History:   Procedure Laterality Date     ARTHROSCOPY KNEE INCISION AND DRAINAGE Left 10/8/2017    Arthroscopic Incision and Drainage of Left Knee - Dr Munoz     HC SHOULDER ARTHROSCOPY, DX  2001    Arthroscopy, Shoulder RT Dr OSIRIS Andersen     HC SHOULDER ARTHROSCOPY, DX  1/04    LT arthroscopy Dr OSIRIS Andersen     HERNIA REPAIR, UMBILICAL  1/08    incarcerated - dr rockwell     HERNIORRHAPHY INGUINAL  12/21/2012    HERNIORRHAPHY INGUINAL;  Right Inguinal Hernia Repair with mesh ;  Surgeon: Mello Rockwell MD;  Location: [facility] OR     REPAIR ANEURYSM ASCENDING AORTA N/A 12/19/2017    REPAIR ANEURYSM ASCENDING AORTA;  REDO Median STERNOTOMY, FEMORAL CANNULATION, Lysis of adhesions, AORTIC ROOT VALVE HOMOGRAFT with 26mm x 7.0cm Cryolife Aortic valve and conduit - Dr Bowers     REPLACE VALVE AORTIC N/A 5/17/2016    REPLACE VALVE AORTIC - Dr Bowers     ROTATOR CUFF REPAIR RT/LT  11/10    Rt arthroscopy - Dr OSIRIS Andersen     SURGICAL HISTORY OF -   5/16    AVR, severe AR, aortic root repair     TRACHEOSTOMY PERCUTANEOUS  10/27/2017            CURRENT MEDICATIONS:   Current Outpatient Prescriptions   Medication     erythromycin 2 % OINT     acetaminophen (TYLENOL) 32 mg/mL solution     aspirin 81 MG chewable tablet     atorvastatin (LIPITOR) 40 MG tablet     carvedilol (COREG) 6.25 MG tablet     bumetanide (BUMEX) 1 MG tablet     Carboxymethylcellulose Sod PF (REFRESH PLUS) 0.5 % SOLN ophthalmic solution     famotidine (PEPCID) 20 MG tablet     Oseltamivir Phosphate (TAMIFLU PO)     hydrocortisone  (CORTAID) 1 % cream     OLANZapine (ZYPREXA) 2.5 MG tablet     mirtazapine (REMERON) 30 MG tablet     ondansetron (ZOFRAN-ODT) 4 MG ODT tab     losartan (COZAAR) 25 MG tablet     senna-docusate (SENOKOT-S;PERICOLACE) 8.6-50 MG per tablet     potassium chloride (KAYCIEL) 20 MEQ/15ML (10%) solution     multivitamin, therapeutic with minerals (THERA-VIT-M) TABS tablet     oxyCODONE IR (ROXICODONE) 5 MG tablet     LORazepam (ATIVAN) 0.5 MG tablet     loperamide (IMODIUM A-D) 2 MG tablet     lactobacillus TABS     Menthol-Zinc Oxide 0.44-20.6 % OINT     Miconazole Nitrate 2 % POWD     ketotifen (ZADITOR/REFRESH ANTI-ITCH) 0.025 % SOLN ophthalmic solution     No current facility-administered medications for this visit.        ALLERGIES:      Allergies   Allergen Reactions     Lisinopril Swelling     One-sided facial swelling after being on lisinopril/HCTZ for one week.        ROS:  Gen: denies frequent headaches, double/blurry vision, persistent insomnia, fatigue, unexplained weight loss/gain   CV: denies chest pain, SOB, palpitations, no peripheral edema  Pulm: denies SOB, asthma or wheezing  GI/: denies liver or kidney problems, blood in stool or BRBPR, voiding without problems  Endo: denies thyroid problems or Diabetes  Heme/Onc: denies bleeding  MS: no weakness, tremors or gait instability  Neuro: denies depression, memory problems      PHYSICAL EXAM:   /79  Pulse 69  SpO2 94%  General: alert and oriented x 3, pleasant, no acute distress, normal mood and affect  CV: S1 S2, no murmurs, rubs or gallops, regular rate and rhythm, no peripheral edema  Pulm: bilateral breath sounds, clear to auscultation, easy work of breathing  GI: (+) bowel sounds, soft non-tender and non-distended  Incision: incisions clean dry and intact without erythema, swelling or drainage, sternum stable  Neuro: Left sided facial droop, right upper and lower extremity weakness, otherwise no changes.     LABS:  Last Basic Metabolic  Panel:  Lab Results   Component Value Date     01/18/2018      Lab Results   Component Value Date    POTASSIUM 4.2 01/18/2018     Lab Results   Component Value Date    CHLORIDE 101 01/18/2018     Lab Results   Component Value Date    IZABELLA 9.3 01/18/2018     Lab Results   Component Value Date    CO2 30 01/18/2018     Lab Results   Component Value Date    BUN 26 01/18/2018     Lab Results   Component Value Date    CR 1.17 01/18/2018     Lab Results   Component Value Date    GLC 83 01/18/2018       Last CBC:   Lab Results   Component Value Date    WBC 7.3 01/18/2018     Lab Results   Component Value Date    RBC 3.63 01/18/2018     Lab Results   Component Value Date    HGB 11.5 01/18/2018     Lab Results   Component Value Date    HCT 36.5 01/18/2018     No components found for: MCT  Lab Results   Component Value Date     01/18/2018     Lab Results   Component Value Date    MCH 31.7 01/18/2018     Lab Results   Component Value Date    MCHC 31.5 01/18/2018     Lab Results   Component Value Date    RDW 15.3 01/18/2018     Lab Results   Component Value Date     01/18/2018       INR:  Lab Results   Component Value Date    INR 1.35 12/21/2017    INR 1.29 12/20/2017    INR 0.90 12/19/2017    INR 0.81 12/19/2017    INR 1.56 12/19/2017    INR 1.27 12/19/2017    INR 1.12 12/18/2017    INR 1.10 12/17/2017    INR 1.06 12/17/2017    INR 1.07 10/30/2017    INR 1.03 10/08/2017    INR 1.25 10/06/2017       IMAGING: None    PROCEDURES: Removal of G-tube         ASSESSMENT/PLAN:  Cricket Miguel is a 62 year old year old male status post aortic root and aortic valve replacement with homograft for severe aortic insufficiency from aortic valve endocarditis who returns to clinic for postop visit. Surgically doing well.  Incisions are healing well with no signs of infection. Hemodynamics are stable.     1. Aortic root pseudoaneurysm - s/p aortic root and aortic valve replacement with homograft, doing well surgically.  Incisions well healed. Eating well, g-tube removed today in clinic. Blood pressures well controlled. No changes to medications. Continue with rehabilitation, maintain sternal precautions.  2. History of aortic valve endocarditis - has completed extended course of antibiotics, no need to follow-up with ID per infectious disease when patient was inpatient and completed antibiotic course. Negative intra-op tissue cultures reassuring. Denies fevers, chills.   3. History of CVA - strength is improving. No new neurological findings. Continue with aggressive PT/OT and speech therapy.  4. Chronic systolic heart failure - EF 25-30% pre-op, on appropriate heart failure medical therapy. Will have patient follow-up with cardiology next week with repeat echo to reassess EF.     Plan:   1. Continue current medications without change.   2. Twice daily dressing changes for old g-tube site.  3. Follow up with your cardiologist as scheduled with repeat echo.  4. Continue Cardiac Rehab until completed.   5. Continue sternal precautions for 12 weeks from surgery date.          The total time spent with the patient was 30 minutes, > 50% of which was spent in counseling.    HAIDER Gordillo

## 2018-01-19 NOTE — NURSING NOTE
Chief Complaint   Patient presents with     Follow Up For     redo root, hemograft 12/19, Daria     Vitals were taken and medications were reconciled.     Lupe Haynes MA  1:45 PM

## 2018-01-19 NOTE — TELEPHONE ENCOUNTER
Date Forms was received: January 19, 2018    Forms received by: Fax    Purpose of Form:  Home Health certification    How the form needs to be returned for patient:  Fax    Form currently placed  [employer]

## 2018-01-19 NOTE — PROGRESS NOTES
CARDIOTHORACIC SURGERY FOLLOW-UP VISIT  1/19/2018     Cricket Miguel   1955   [id number]      Reason for visit: Post-Op aortic root and aortic valve replacement with homograft with Dr. Bowers on 1/7/2018     HPI: Cricket Miguel is a 62 year old year old male seen in clinic for a routine follow-up appointment after surgery. Patient has past medical history of aortic root pseudoaneurysm, MSSA aortic valve endocarditis, chronic systolic heart failure, history of CVA, hypertension, dyslipidemia. Hospital course was remarkable for post-operative delirium.    Patient has been doing well since discharge and now returns to clinic for postop visit.    Significant other and patient report patient is making small progress neurologically gaining strength in the upper extremity. Patient denies any fever, chills, chest pain, palpitations, edema, SOB, lightheadedness and nausea.    Patient reports incision is healed well.  Normal bowel movements. He has been eating adequate amounts of calories and protein with supporting documentation today in clinic. Voiding without problems.    Patient is not on Coumadin.  Weight stable.    Blood glucose levels have been under good control.      PAST MEDICAL HISTORY:  Past Medical History:   Diagnosis Date     Abscess of aortic root 09/2017     AI (aortic insufficiency)     severe     Anxiety      Aortic root dilatation (H)      AR (aortic regurgitation)     severe     Cardiomyopathy (H)      CHF (congestive heart failure), NYHA class II (H)      COPD, mild (H)     spirometry 12/16     CVA (cerebral vascular accident) (H) 09/2017    septic emboli - MSSA - cerebellum, Left MCA, Rt Occipital, Aphasia, Left visual field cut, moderate to severe encephalopathy     Depression 09/2017     Heart murmur      Hyperlipidemia LDL goal <70 10/31/2010     Hypertension goal BP (blood pressure) < 140/90      Inguinal hernia      left lung nodule  1/29/2008     Major depressive disorder, single  episode, moderate (H)      Psoriasis childhood     Tobacco use disorder        PAST SURGICAL HISTORY:  Past Surgical History:   Procedure Laterality Date     ARTHROSCOPY KNEE INCISION AND DRAINAGE Left 10/8/2017    Arthroscopic Incision and Drainage of Left Knee - Dr Munoz     HC SHOULDER ARTHROSCOPY, DX  2001    Arthroscopy, Shoulder RT Dr OSIRIS Andersen     HC SHOULDER ARTHROSCOPY, DX  1/04    LT arthroscopy Dr OSIRIS Andersen     HERNIA REPAIR, UMBILICAL  1/08    incarcerated - dr rockwell     HERNIORRHAPHY INGUINAL  12/21/2012    HERNIORRHAPHY INGUINAL;  Right Inguinal Hernia Repair with mesh ;  Surgeon: Mello Rockwell MD;  Location: [facility] OR     REPAIR ANEURYSM ASCENDING AORTA N/A 12/19/2017    REPAIR ANEURYSM ASCENDING AORTA;  REDO Median STERNOTOMY, FEMORAL CANNULATION, Lysis of adhesions, AORTIC ROOT VALVE HOMOGRAFT with 26mm x 7.0cm Cryolife Aortic valve and conduit - Dr Bowers     REPLACE VALVE AORTIC N/A 5/17/2016    REPLACE VALVE AORTIC - Dr Bowers     ROTATOR CUFF REPAIR RT/LT  11/10    Rt arthroscopy - Dr OSIRIS Andersen     SURGICAL HISTORY OF -   5/16    AVR, severe AR, aortic root repair     TRACHEOSTOMY PERCUTANEOUS  10/27/2017            CURRENT MEDICATIONS:   Current Outpatient Prescriptions   Medication     erythromycin 2 % OINT     acetaminophen (TYLENOL) 32 mg/mL solution     aspirin 81 MG chewable tablet     atorvastatin (LIPITOR) 40 MG tablet     carvedilol (COREG) 6.25 MG tablet     bumetanide (BUMEX) 1 MG tablet     Carboxymethylcellulose Sod PF (REFRESH PLUS) 0.5 % SOLN ophthalmic solution     famotidine (PEPCID) 20 MG tablet     Oseltamivir Phosphate (TAMIFLU PO)     hydrocortisone (CORTAID) 1 % cream     OLANZapine (ZYPREXA) 2.5 MG tablet     mirtazapine (REMERON) 30 MG tablet     ondansetron (ZOFRAN-ODT) 4 MG ODT tab     losartan (COZAAR) 25 MG tablet     senna-docusate (SENOKOT-S;PERICOLACE) 8.6-50 MG per tablet     potassium chloride (KAYCIEL) 20 MEQ/15ML (10%) solution     multivitamin,  therapeutic with minerals (THERA-VIT-M) TABS tablet     oxyCODONE IR (ROXICODONE) 5 MG tablet     LORazepam (ATIVAN) 0.5 MG tablet     loperamide (IMODIUM A-D) 2 MG tablet     lactobacillus TABS     Menthol-Zinc Oxide 0.44-20.6 % OINT     Miconazole Nitrate 2 % POWD     ketotifen (ZADITOR/REFRESH ANTI-ITCH) 0.025 % SOLN ophthalmic solution     No current facility-administered medications for this visit.        ALLERGIES:      Allergies   Allergen Reactions     Lisinopril Swelling     One-sided facial swelling after being on lisinopril/HCTZ for one week.        ROS:  Gen: denies frequent headaches, double/blurry vision, persistent insomnia, fatigue, unexplained weight loss/gain   CV: denies chest pain, SOB, palpitations, no peripheral edema  Pulm: denies SOB, asthma or wheezing  GI/: denies liver or kidney problems, blood in stool or BRBPR, voiding without problems  Endo: denies thyroid problems or Diabetes  Heme/Onc: denies bleeding  MS: no weakness, tremors or gait instability  Neuro: denies depression, memory problems      PHYSICAL EXAM:   /79  Pulse 69  SpO2 94%  General: alert and oriented x 3, pleasant, no acute distress, normal mood and affect  CV: S1 S2, no murmurs, rubs or gallops, regular rate and rhythm, no peripheral edema  Pulm: bilateral breath sounds, clear to auscultation, easy work of breathing  GI: (+) bowel sounds, soft non-tender and non-distended  Incision: incisions clean dry and intact without erythema, swelling or drainage, sternum stable  Neuro: Left sided facial droop, right upper and lower extremity weakness, otherwise no changes.     LABS:  Last Basic Metabolic Panel:  Lab Results   Component Value Date     01/18/2018      Lab Results   Component Value Date    POTASSIUM 4.2 01/18/2018     Lab Results   Component Value Date    CHLORIDE 101 01/18/2018     Lab Results   Component Value Date    IZABELLA 9.3 01/18/2018     Lab Results   Component Value Date    CO2 30 01/18/2018      Lab Results   Component Value Date    BUN 26 01/18/2018     Lab Results   Component Value Date    CR 1.17 01/18/2018     Lab Results   Component Value Date    GLC 83 01/18/2018       Last CBC:   Lab Results   Component Value Date    WBC 7.3 01/18/2018     Lab Results   Component Value Date    RBC 3.63 01/18/2018     Lab Results   Component Value Date    HGB 11.5 01/18/2018     Lab Results   Component Value Date    HCT 36.5 01/18/2018     No components found for: MCT  Lab Results   Component Value Date     01/18/2018     Lab Results   Component Value Date    MCH 31.7 01/18/2018     Lab Results   Component Value Date    MCHC 31.5 01/18/2018     Lab Results   Component Value Date    RDW 15.3 01/18/2018     Lab Results   Component Value Date     01/18/2018       INR:  Lab Results   Component Value Date    INR 1.35 12/21/2017    INR 1.29 12/20/2017    INR 0.90 12/19/2017    INR 0.81 12/19/2017    INR 1.56 12/19/2017    INR 1.27 12/19/2017    INR 1.12 12/18/2017    INR 1.10 12/17/2017    INR 1.06 12/17/2017    INR 1.07 10/30/2017    INR 1.03 10/08/2017    INR 1.25 10/06/2017       IMAGING: None    PROCEDURES: Removal of G-tube         ASSESSMENT/PLAN:  Cricket Miguel is a 62 year old year old male status post aortic root and aortic valve replacement with homograft for severe aortic insufficiency from aortic valve endocarditis who returns to clinic for postop visit. Surgically doing well.  Incisions are healing well with no signs of infection. Hemodynamics are stable.     1. Aortic root pseudoaneurysm - s/p aortic root and aortic valve replacement with homograft, doing well surgically. Incisions well healed. Eating well, g-tube removed today in clinic. Blood pressures well controlled. No changes to medications. Continue with rehabilitation, maintain sternal precautions.  2. History of aortic valve endocarditis - has completed extended course of antibiotics, no need to follow-up with ID per infectious  disease when patient was inpatient and completed antibiotic course. Negative intra-op tissue cultures reassuring. Denies fevers, chills.   3. History of CVA - strength is improving. No new neurological findings. Continue with aggressive PT/OT and speech therapy.  4. Chronic systolic heart failure - EF 25-30% pre-op, on appropriate heart failure medical therapy. Will have patient follow-up with cardiology next week with repeat echo to reassess EF.     Plan:   1. Continue current medications without change.   2. Twice daily dressing changes for old g-tube site.  3. Follow up with your cardiologist as scheduled with repeat echo.  4. Continue Cardiac Rehab until completed.   5. Continue sternal precautions for 12 weeks from surgery date.          The total time spent with the patient was 30 minutes, > 50% of which was spent in counseling.    HAIDER Gordillo

## 2018-01-19 NOTE — MR AVS SNAPSHOT
"              After Visit Summary   1/19/2018    Cricket Miguel    MRN: [id number]           Patient Information     Date Of Birth          1955        Visit Information        Provider Department      1/19/2018 1:30 PM  [facility]        Today's Diagnoses     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    -  1    S/P AVR (aortic valve replacement)        H/O aortic root repair           Follow-ups after your visit        Your next 10 appointments already scheduled     Jan 23, 2018  1:50 PM CST   CORE Enrollment with BIBI Bettencourt CNP   [facility] ([facility])    [address]  [facility] [address] [phone]              Who to contact     If you have questions or need follow up information about today's clinic visit or your schedule please contact [facility] directly at [phone].  Normal or non-critical lab and imaging results will be communicated to you by MyChart, letter or phone within 4 business days after the clinic has received the results. If you do not hear from us within 7 days, please contact the clinic through MyChart or phone. If you have a critical or abnormal lab result, we will notify you by phone as soon as possible.  Submit refill requests through [facility] or call your pharmacy and they will forward the refill request to us. Please allow 3 business days for your refill to be completed.          Additional Information About Your Visit        MyChart Information     [facility] lets you send messages to your doctor, view your test results, renew your prescriptions, schedule appointments and more. To sign up, go to [email or web address] . Click on \"Log in\" on the left side of the screen, which will take you to the Welcome page. Then click on \"Sign up Now\" on the right side of the page.     You will be asked to enter the access code listed below, as well " as some personal information. Please follow the directions to create your username and password.     Your access code is: [email or web address]  Expires: 2018  7:55 AM     Your access code will  in 90 days. If you need help or a new code, please call your [facility] clinic or [phone].        Care EveryWhere ID     This is your Care EveryWhere ID. This could be used by other organizations to access your [facility] medical records  [id number]        Your Vitals Were     Pulse Pulse Oximetry                69 94%           Blood Pressure from Last 3 Encounters:   18 107/79   18 110/76   18 116/84    Weight from Last 3 Encounters:   18 76.7 kg (169 lb 3.2 oz)   18 78.1 kg (172 lb 3.2 oz)   18 78.7 kg (173 lb 8 oz)              Today, you had the following     No orders found for display         Today's Medication Changes          These changes are accurate as of: 18 11:59 PM.  If you have any questions, ask your nurse or doctor.               These medicines have changed or have updated prescriptions.        Dose/Directions    senna-docusate 8.6-50 MG per tablet   Commonly known as:  SENOKOT-S;PERICOLACE   This may have changed:  how much to take   Used for:  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        Dose:  1-2 tablet   Take 1-2 tablets by mouth 2 times daily as needed for constipation   Quantity:  100 tablet   Refills:  0                Primary Care Provider Office Phone # Fax #    Dipak Gordillo [phone]       [address] [place] [address]        Equal Access to Services     [facility]: Hadii jenise Almodovar, waaxda luqadaha, qaybta kaalfreddie mcdonnell. So [facility] [phone].    ATENCIÓN: Si habla español, tiene a mendiola disposición servicios gratuitos de asistencia lingüística. Brandin al [phone].    We comply with applicable federal civil rights laws and  [place] laws. We do not discriminate on the basis of race, color, national origin, age, disability, sex, sexual orientation, or gender identity.            Thank you!     Thank you for choosing [facility]  for your care. Our goal is always to provide you with excellent care. Hearing back from our patients is one way we can continue to improve our services. Please take a few minutes to complete the written survey that you may receive in the mail after your visit with us. Thank you!             Your Updated Medication List - Protect others around you: Learn how to safely use, store and throw away your medicines at www.disposemymeds.org.          This list is accurate as of: 1/19/18 11:59 PM.  Always use your most recent med list.                   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, Cerebrovascular accident (CVA) due to other mechanism (H)       atorvastatin 40 MG tablet    LIPITOR    60 tablet    Take 1 tablet (40 mg) by mouth daily    Cerebrovascular accident (CVA) due to other mechanism (H)       bumetanide 1 MG tablet    BUMEX    60 tablet    Take 1 tablet (1 mg) by mouth 2 times daily    Cardiomyopathy, unspecified type (H)       Carboxymethylcellulose Sod PF 0.5 % Soln ophthalmic solution    REFRESH PLUS    1 Bottle    Place 1 drop Into the left eye 3 times daily as needed (to flush debris from eye)    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       carvedilol 6.25 MG tablet    COREG    60 tablet    Take 1 tablet (6.25 mg) by mouth 2 times daily (with meals)    Endocarditis 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  classified elsewhere, Hypertension goal BP (blood pressure) < 140/90       erythromycin 2 % Oint           famotidine 20 MG tablet    PEPCID    60 tablet    Take 1 tablet (20 mg) by mouth 2 times daily    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       hydrocortisone 1 % cream    CORTAID     Apply topically 2 times daily        ketotifen 0.025 % Soln ophthalmic solution    ZADITOR/REFRESH ANTI-ITCH    1 Bottle    Place 1 drop Into the left eye 3 times daily        lactobacillus Tabs      Take 1 tablet by mouth 3 times daily        loperamide 2 MG tablet    IMODIUM A-D    30 tablet    2 tab by g tube 3 times daily PRN for diarrhea        LORazepam 0.5 MG tablet    ATIVAN    60 tablet    Take 1 tablet (0.5 mg) by mouth 2 times daily as needed for anxiety    Anxiety       losartan 25 MG tablet    COZAAR    60 tablet    Take 1 tablet (25 mg) by mouth every 12 hours    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, Hypertension goal BP (blood pressure) < 140/90       Menthol-Zinc Oxide 0.44-20.6 % Oint      1 application topically TID PRN for perianal irritation        Miconazole Nitrate 2 % Powd      Apply in folds        mirtazapine 30 MG tablet    REMERON    30 tablet    Take 1 tablet (30 mg) by mouth At Bedtime    Depression, unspecified depression type       multivitamin, therapeutic with minerals Tabs tablet     30 each    Take 1 tablet by mouth daily    Cerebrovascular accident (CVA) due to other mechanism (H)       OLANZapine 2.5 MG tablet    zyPREXA    60 tablet    Take 1 tablet (2.5 mg) by mouth At Bedtime    Cerebrovascular accident (CVA) due to other mechanism (H), Anxiety, Delirium due to another medical condition       ondansetron 4 MG ODT tab    ZOFRAN-ODT    120 tablet    Take 1 tablet (4 mg) by mouth every 6 hours as needed for nausea or vomiting    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       oxyCODONE IR 5 MG  tablet    ROXICODONE    40 tablet    Take 1 tablet (5 mg) by mouth every 4 hours as needed for moderate to severe pain    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

## 2018-01-22 PROBLEM — I50.22 CHRONIC SYSTOLIC HEART FAILURE (H): Status: ACTIVE | Noted: 2017-12-16

## 2018-01-23 ENCOUNTER — OFFICE VISIT (OUTPATIENT)
Dept: CARDIOLOGY | Facility: CLINIC | Age: 63
End: 2018-01-23
Attending: INTERNAL MEDICINE
Payer: COMMERCIAL

## 2018-01-23 ENCOUNTER — NURSING HOME VISIT (OUTPATIENT)
Dept: GERIATRICS | Facility: CLINIC | Age: 63
End: 2018-01-23
Payer: COMMERCIAL

## 2018-01-23 VITALS
BODY MASS INDEX: 26.2 KG/M2 | TEMPERATURE: 98 F | WEIGHT: 172.3 LBS | DIASTOLIC BLOOD PRESSURE: 75 MMHG | RESPIRATION RATE: 18 BRPM | HEART RATE: 69 BPM | SYSTOLIC BLOOD PRESSURE: 105 MMHG | OXYGEN SATURATION: 94 %

## 2018-01-23 VITALS
HEART RATE: 75 BPM | OXYGEN SATURATION: 91 % | DIASTOLIC BLOOD PRESSURE: 74 MMHG | SYSTOLIC BLOOD PRESSURE: 113 MMHG | HEIGHT: 68 IN | BODY MASS INDEX: 26.2 KG/M2

## 2018-01-23 DIAGNOSIS — I10 HYPERTENSION GOAL BP (BLOOD PRESSURE) < 140/90: ICD-10-CM

## 2018-01-23 DIAGNOSIS — I50.9 CONGESTIVE HEART FAILURE, NYHA CLASS 2, UNSPECIFIED CONGESTIVE HEART FAILURE TYPE (H): ICD-10-CM

## 2018-01-23 DIAGNOSIS — F41.9 ANXIETY: ICD-10-CM

## 2018-01-23 DIAGNOSIS — I63.9 CEREBROVASCULAR ACCIDENT (CVA), UNSPECIFIED MECHANISM (H): ICD-10-CM

## 2018-01-23 DIAGNOSIS — R53.81 PHYSICAL DECONDITIONING: ICD-10-CM

## 2018-01-23 DIAGNOSIS — I25.5 ISCHEMIC CARDIOMYOPATHY: ICD-10-CM

## 2018-01-23 DIAGNOSIS — I39 ENDOCARDITIS AND HEART VALVE DISORDERS IN DISEASES CLASSIFIED ELSEWHERE: ICD-10-CM

## 2018-01-23 DIAGNOSIS — I39 ENDOCARDITIS AND HEART VALVE DISORDERS IN DISEASES CLASSIFIED ELSEWHERE: Primary | ICD-10-CM

## 2018-01-23 DIAGNOSIS — F32.1 MAJOR DEPRESSIVE DISORDER, SINGLE EPISODE, MODERATE (H): ICD-10-CM

## 2018-01-23 DIAGNOSIS — R13.12 OROPHARYNGEAL DYSPHAGIA: ICD-10-CM

## 2018-01-23 PROCEDURE — 99309 SBSQ NF CARE MODERATE MDM 30: CPT | Performed by: NURSE PRACTITIONER

## 2018-01-23 PROCEDURE — 99214 OFFICE O/P EST MOD 30 MIN: CPT | Performed by: NURSE PRACTITIONER

## 2018-01-23 RX ORDER — CARVEDILOL 12.5 MG/1
12.5 TABLET ORAL 2 TIMES DAILY WITH MEALS
COMMUNITY
Start: 2018-01-23 | End: 2018-01-01

## 2018-01-23 NOTE — LETTER
1/23/2018      Dipak Gordillo MD  [address] [facility] [address]      RE: Cricket Miguel       Dear Colleague,    I had the pleasure of seeing Cricket Miguel in the [facility].    Service Date: 01/23/2018      HISTORY OF PRESENT ILLNESS:     Cricket Miguel is a 62-year-old patient who has had a tragic couple of years.  He is accompanied by his girlfriend today.  He follows with Dr. Garzon and was referred to the [facility].  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  He was doing fine to 2016, but then developed a stroke in 2017 and eventually effectively dehisced his ascending aorta repair, requiring replacement during his last hospitalization.  He now has cardiomyopathy with an ejection fraction around 20%-30% and is very depressed about his extreme left facial droop and right hemiparesis.  He has had a very difficult couple of months.  He was recently seen by Dr. Garzon.  He referred him to the [facility].      He enrolls today.  His emotions are very high and low.  He is here with his girlfriend, who is his advocate.  No weight is available.  At his facility they try to weigh him on a Khushboo lift; however, it does not appear to be accurate.  He recently had the G-tube removed.  The patient's girlfriend states that she thinks he is improving.      He denies increased shortness of breath, orthopnea, syncope or near-syncope.      He is currently at [facility].  He is following a no-added-salt diet.  He is also on a fluid restriction.      PHYSICAL EXAMINATION:    Blood pressure 113/74, pulse is 75, and no weight.   Please see complete physical examination below.      LABORATORY DATA: Basic metabolic panel from 01/18/2018:  Sodium 140, potassium 4.2, BUN 26, creatinine 1.17, GFR 63.      ASSESSMENT AND PLAN:   1.  History of infective endocarditis, status post aortic valve and ascending aortic  replacement, subsequent stroke and dehiscence requiring replacement. From a cardiac standpoint, he is doing well.  His cardiovascular medications are appropriate.      2.  Coronary artery disease, status post CABG at the time of his initial valve replacement. Continue medical therapy.      3.  Stroke with left-sided facial droop and right-sided hemiparesis. Continue rehabilitation.      4.  Cardiomyopathy, valvular and ischemic. Enrolled in the [facility] today.  I have increased carvedilol to 12.5 mg twice a day. Repeat echocardiogram in March with a followup visit with Dr. Garzon.         EMILY BEE, APRN, CNP             D: 2018   T: 2018   MT: MERCEDEZ      Name:     ASHTYN STROUD   MRN:      [id number]        Account:      [id number]   :      1955           Service Date: 2018      Document: [id number]           Outpatient Encounter Prescriptions as of 2018   Medication Sig Dispense Refill     carvedilol (COREG) 12.5 MG tablet Take 1 tablet (12.5 mg) by mouth 2 times daily (with meals)       [] erythromycin 2 % OINT Externally apply topically 4 times daily        [] Oseltamivir Phosphate (TAMIFLU PO) Take 75 mg by mouth daily       [] hydrocortisone (CORTAID) 1 % cream Apply topically 2 times daily       OLANZapine (ZYPREXA) 2.5 MG tablet Take 1 tablet (2.5 mg) by mouth At Bedtime 60 tablet 0     acetaminophen (TYLENOL) 32 mg/mL solution 20.3 mLs (650 mg) by Per NG tube route every 4 hours as needed for mild pain 400 mL 1     aspirin 81 MG chewable tablet 1 tablet (81 mg) by Oral or Feeding Tube route daily 60 tablet 1     mirtazapine (REMERON) 30 MG tablet Take 1 tablet (30 mg) by mouth At Bedtime 30 tablet 1     ondansetron (ZOFRAN-ODT) 4 MG ODT tab Take 1 tablet (4 mg) by mouth every 6 hours as needed for nausea or vomiting 120 tablet 1     atorvastatin (LIPITOR) 40 MG tablet Take 1 tablet (40 mg) by mouth daily 60 tablet 1     losartan (COZAAR)  25 MG tablet Take 1 tablet (25 mg) by mouth every 12 hours 60 tablet 1     senna-docusate (SENOKOT-S;PERICOLACE) 8.6-50 MG per tablet Take 1-2 tablets by mouth 2 times daily as needed for constipation 100 tablet 0     potassium chloride (KAYCIEL) 20 MEQ/15ML (10%) solution Take 15 mLs (20 mEq) by mouth 2 times daily 900 mL 0     multivitamin, therapeutic with minerals (THERA-VIT-M) TABS tablet Take 1 tablet by mouth daily 30 each 0     Carboxymethylcellulose Sod PF (REFRESH PLUS) 0.5 % SOLN ophthalmic solution Place 1 drop Into the left eye 3 times daily as needed (to flush debris from eye) 1 Bottle 1     famotidine (PEPCID) 20 MG tablet Take 1 tablet (20 mg) by mouth 2 times daily 60 tablet 1     [DISCONTINUED] bumetanide (BUMEX) 1 MG tablet Take 1 tablet (1 mg) by mouth 2 times daily (Patient not taking: Reported on 2/13/2018) 60 tablet 0     [DISCONTINUED] oxyCODONE IR (ROXICODONE) 5 MG tablet Take 1 tablet (5 mg) by mouth every 4 hours as needed for moderate to severe pain (Patient not taking: Reported on 2/13/2018) 40 tablet 0     [DISCONTINUED] LORazepam (ATIVAN) 0.5 MG tablet Take 1 tablet (0.5 mg) by mouth 2 times daily as needed for anxiety (Patient not taking: Reported on 2/13/2018) 60 tablet 0     loperamide (IMODIUM A-D) 2 MG tablet 2 tab by g tube 3 times daily PRN for diarrhea 30 tablet 0     Menthol-Zinc Oxide 0.44-20.6 % OINT 1 application topically TID PRN for perianal irritation       Miconazole Nitrate 2 % POWD as needed Apply in folds       [DISCONTINUED] carvedilol (COREG) 6.25 MG tablet Take 1 tablet (6.25 mg) by mouth 2 times daily (with meals) 60 tablet 1     lactobacillus TABS Take 1 tablet by mouth 3 times daily       [DISCONTINUED] ketotifen (ZADITOR/REFRESH ANTI-ITCH) 0.025 % SOLN ophthalmic solution Place 1 drop Into the left eye 3 times daily 1 Bottle      No facility-administered encounter medications on file as of 1/23/2018.        Again, thank you for allowing me to participate in  the care of your patient.      Sincerely,    BIBI Faust [facility]

## 2018-01-23 NOTE — PROGRESS NOTES
HPI and Plan:   See [id number]    Orders Placed This Encounter   Procedures     Follow-Up with Cardiologist     Echocardiogram       Orders Placed This Encounter   Medications     carvedilol (COREG) 12.5 MG tablet     Sig: Take 1 tablet (12.5 mg) by mouth 2 times daily (with meals)       Medications Discontinued During This Encounter   Medication Reason     carvedilol (COREG) 6.25 MG tablet Reorder         Encounter Diagnoses   Name Primary?     Ischemic cardiomyopathy      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      Hypertension goal BP (blood pressure) < 140/90        CURRENT MEDICATIONS:  Current Outpatient Prescriptions   Medication Sig Dispense Refill     carvedilol (COREG) 12.5 MG tablet Take 1 tablet (12.5 mg) by mouth 2 times daily (with meals)       erythromycin 2 % OINT Externally apply topically 4 times daily        Oseltamivir Phosphate (TAMIFLU PO) Take 75 mg by mouth daily       hydrocortisone (CORTAID) 1 % cream Apply topically 2 times daily       OLANZapine (ZYPREXA) 2.5 MG tablet Take 1 tablet (2.5 mg) by mouth At Bedtime 60 tablet 0     acetaminophen (TYLENOL) 32 mg/mL solution 20.3 mLs (650 mg) by Per NG tube route every 4 hours as needed for mild pain 400 mL 1     aspirin 81 MG chewable tablet 1 tablet (81 mg) by Oral or Feeding Tube route daily 60 tablet 1     mirtazapine (REMERON) 30 MG tablet Take 1 tablet (30 mg) by mouth At Bedtime 30 tablet 1     ondansetron (ZOFRAN-ODT) 4 MG ODT tab Take 1 tablet (4 mg) by mouth every 6 hours as needed for nausea or vomiting 120 tablet 1     atorvastatin (LIPITOR) 40 MG tablet Take 1 tablet (40 mg) by mouth daily 60 tablet 1     losartan (COZAAR) 25 MG tablet Take 1 tablet (25 mg) by mouth every 12 hours 60 tablet 1     bumetanide (BUMEX) 1 MG tablet Take 1 tablet (1 mg) by mouth 2 times daily 60 tablet 0     senna-docusate (SENOKOT-S;PERICOLACE) 8.6-50 MG per tablet Take 1-2 tablets by mouth 2 times daily as needed for  constipation (Patient taking differently: Take 1 tablet by mouth 2 times daily as needed for constipation ) 100 tablet 0     potassium chloride (KAYCIEL) 20 MEQ/15ML (10%) solution Take 15 mLs (20 mEq) by mouth 2 times daily 900 mL 0     multivitamin, therapeutic with minerals (THERA-VIT-M) TABS tablet Take 1 tablet by mouth daily 30 each 0     Carboxymethylcellulose Sod PF (REFRESH PLUS) 0.5 % SOLN ophthalmic solution Place 1 drop Into the left eye 3 times daily as needed (to flush debris from eye) 1 Bottle 1     famotidine (PEPCID) 20 MG tablet Take 1 tablet (20 mg) by mouth 2 times daily 60 tablet 1     oxyCODONE IR (ROXICODONE) 5 MG tablet Take 1 tablet (5 mg) by mouth every 4 hours as needed for moderate to severe pain 40 tablet 0     LORazepam (ATIVAN) 0.5 MG tablet Take 1 tablet (0.5 mg) by mouth 2 times daily as needed for anxiety 60 tablet 0     loperamide (IMODIUM A-D) 2 MG tablet 2 tab by g tube 3 times daily PRN for diarrhea 30 tablet 0     Menthol-Zinc Oxide 0.44-20.6 % OINT 1 application topically TID PRN for perianal irritation       Miconazole Nitrate 2 % POWD as needed Apply in folds       [DISCONTINUED] carvedilol (COREG) 6.25 MG tablet Take 1 tablet (6.25 mg) by mouth 2 times daily (with meals) 60 tablet 1     lactobacillus TABS Take 1 tablet by mouth 3 times daily       ketotifen (ZADITOR/REFRESH ANTI-ITCH) 0.025 % SOLN ophthalmic solution Place 1 drop Into the left eye 3 times daily 1 Bottle        ALLERGIES     Allergies   Allergen Reactions     Lisinopril Swelling     One-sided facial swelling after being on lisinopril/HCTZ for one week.        PAST MEDICAL HISTORY:  Past Medical History:   Diagnosis Date     Abscess of aortic root 09/2017     AI (aortic insufficiency)     severe     Anxiety      Aortic root dilatation (H)      AR (aortic regurgitation)     severe     Cardiomyopathy (H)      CHF (congestive heart failure), NYHA class II (H)      COPD, mild (H)     spirometry 12/16     CVA  (cerebral vascular accident) (H) 09/2017    septic emboli - MSSA - cerebellum, Left MCA, Rt Occipital, Aphasia, Left visual field cut, moderate to severe encephalopathy     Depression 09/2017     Heart murmur      Hyperlipidemia LDL goal <70 10/31/2010     Hypertension goal BP (blood pressure) < 140/90      Inguinal hernia      left lung nodule  1/29/2008     Major depressive disorder, single episode, moderate (H)      Psoriasis childhood     Tobacco use disorder        PAST SURGICAL HISTORY:  Past Surgical History:   Procedure Laterality Date     ARTHROSCOPY KNEE INCISION AND DRAINAGE Left 10/8/2017    Arthroscopic Incision and Drainage of Left Knee - Dr Munoz     HC SHOULDER ARTHROSCOPY, DX  2001    Arthroscopy, Shoulder RT Dr OSIRIS Andersen     HC SHOULDER ARTHROSCOPY, DX  1/04    LT arthroscopy Dr OSIRIS Andersen     HERNIA REPAIR, UMBILICAL  1/08    incarcerated - dr rockwell     HERNIORRHAPHY INGUINAL  12/21/2012    HERNIORRHAPHY INGUINAL;  Right Inguinal Hernia Repair with mesh ;  Surgeon: Mello Rockwell MD;  Location: [facility] OR     REPAIR ANEURYSM ASCENDING AORTA N/A 12/19/2017    REPAIR ANEURYSM ASCENDING AORTA;  REDO Median STERNOTOMY, FEMORAL CANNULATION, Lysis of adhesions, AORTIC ROOT VALVE HOMOGRAFT with 26mm x 7.0cm Cry[employer]ife Aortic valve and conduit - Dr Bowers     REPLACE VALVE AORTIC N/A 5/17/2016    REPLACE VALVE AORTIC - Dr Bowers     ROTATOR CUFF REPAIR RT/LT  11/10    Rt arthroscopy - Dr OSIRIS Andersen     SURGICAL HISTORY OF -   5/16    AVR, severe AR, aortic root repair     TRACHEOSTOMY PERCUTANEOUS  10/27/2017            FAMILY HISTORY:  Family History   Problem Relation Age of Onset     Genetic Disorder Mother      Bone plateover growth Agustin. shoulder surgeries     CANCER Mother      brain cancer     Alzheimer Disease Father        SOCIAL HISTORY:  Social History     Social History     Marital status:      Spouse name: N/A     Number of children: 3     Years of education: 12     Social History  "Main Topics     Smoking status: Former Smoker     Packs/day: 1.00     Years: 35.00     Quit date: 4/1/2016     Smokeless tobacco: Never Used      Comment: 4/2016     Alcohol use 0.0 oz/week     0 Standard drinks or equivalent per week      Comment: once monthly     Drug use: Yes      Comment: marijuana- daily previously     Sexual activity: Yes     Partners: Female     Other Topics Concern     Caffeine Concern Yes     3 cups     Sleep Concern No     Special Diet No     trying to watch salt     Exercise Yes     walking     Seat Belt No     Parent/Sibling W/ Cabg, Mi Or Angioplasty Before 65f 55m? No     Social History Narrative       Review of Systems:  Skin:  Positive for   g-tube recently removed, healing well   Eyes:  Positive for glasses    ENT:  Negative   g-tube removed - on regular diet  Respiratory:  Negative       Cardiovascular:    Positive for;fatigue in rehab post stroke.  energy level improving  Gastroenterology: Negative      Genitourinary:  Negative      Musculoskeletal:  Positive for joint stiffness hands  Neurologic:  Positive for paralysis;stroke right sided weakness  Psychiatric:  Positive for excessive stress    Heme/Lymph/Imm:  Positive for allergies    Endocrine:  Negative        Physical Exam:  Vitals: /74 (BP Location: Left arm, Patient Position: Chair, Cuff Size: Adult Regular)  Pulse 75  Ht 1.727 m (5' 8\")  SpO2 91%  BMI 26.2 kg/m2    Constitutional:  cooperative chronically ill      Skin:  warm and dry to the touch surgical scars well-healed        Head:  normocephalic        Eyes:  sclera white   left lid lag and facial droop    Lymph:      ENT:  no pallor or cyanosis        Neck:  JVP normal        Respiratory:  normal breath sounds, clear to auscultation, normal A-P diameter, normal symmetry, normal respiratory excursion, no use of accessory muscles         Cardiac: regular rhythm       grade 1;RUSB        pulses full and equal                                        GI:  " abdomen soft        Extremities and Muscular Skeletal:  no deformities, clubbing, cyanosis, erythema observed              Neurological:      left facial droop, right hemiparesis.    Psych:  Alert and Oriented x 3        CC  Alfredito Garzon MD  [id number]  [place] [id number]

## 2018-01-23 NOTE — PROGRESS NOTES
[facility]    Chief Complaint   Patient presents with     Nursing Home Acute       HPI:    Cricket Miguel is a 62 year old  (1955), who is being seen today for an episodic care visit at [facility].  HPI information obtained from: facility chart records, facility staff, patient report and [facility] chart review.Today's concern is:  1. 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 Cerebrovascular accident (CVA), unspecified mechanism (H)    3. Oropharyngeal dysphagia    4. Congestive heart failure, NYHA class 2, unspecified congestive heart failure type (H)    5. Hypertension goal BP (blood pressure) < 140/90    6. Major depressive disorder, single episode, moderate (HCC)    7. Anxiety    8. Physical deconditioning      Mr. Miguel was visited today while he was resting in his bed. He denies chest pain or shortness of breath. Sleeping well. Denies discomfort with urination. States that he has not had a bowel movement in three days, per [facility], he had a moderate bowel movement today. He is eating well,  g tube was removed last week He went to a cardiology appt today and was enrolled with the [facility]. Continues to work with therapy and making small progress.     Weight: (1/4/17) 169.2 lbs - (1/22/18) 172.3 lbs (1/23/18) 170.2 lbs  BP: 105-130/75-88 mmHg  P: 64-68 bpm  O2: 93-96% on room air    ALLERGIES: Lisinopril  Past Medical, Surgical, Family and Social History reviewed and updated in [facility].    Current Outpatient Prescriptions   Medication Sig Dispense Refill     erythromycin 2 % OINT Externally apply topically 4 times daily        Oseltamivir Phosphate (TAMIFLU PO) Take 75 mg by mouth daily       OLANZapine (ZYPREXA) 2.5 MG tablet Take 1 tablet (2.5 mg) by mouth At Bedtime 60 tablet 0     acetaminophen (TYLENOL) 32 mg/mL solution 20.3 mLs (650 mg) by Per NG tube route every 4 hours as needed for mild pain 400 mL 1     aspirin 81  MG chewable tablet 1 tablet (81 mg) by Oral or Feeding Tube route daily 60 tablet 1     mirtazapine (REMERON) 30 MG tablet Take 1 tablet (30 mg) by mouth At Bedtime 30 tablet 1     ondansetron (ZOFRAN-ODT) 4 MG ODT tab Take 1 tablet (4 mg) by mouth every 6 hours as needed for nausea or vomiting 120 tablet 1     atorvastatin (LIPITOR) 40 MG tablet Take 1 tablet (40 mg) by mouth daily 60 tablet 1     losartan (COZAAR) 25 MG tablet Take 1 tablet (25 mg) by mouth every 12 hours 60 tablet 1     bumetanide (BUMEX) 1 MG tablet Take 1 tablet (1 mg) by mouth 2 times daily 60 tablet 0     senna-docusate (SENOKOT-S;PERICOLACE) 8.6-50 MG per tablet Take 1-2 tablets by mouth 2 times daily as needed for constipation (Patient taking differently: Take 1 tablet by mouth 2 times daily as needed for constipation ) 100 tablet 0     potassium chloride (KAYCIEL) 20 MEQ/15ML (10%) solution Take 15 mLs (20 mEq) by mouth 2 times daily 900 mL 0     multivitamin, therapeutic with minerals (THERA-VIT-M) TABS tablet Take 1 tablet by mouth daily 30 each 0     Carboxymethylcellulose Sod PF (REFRESH PLUS) 0.5 % SOLN ophthalmic solution Place 1 drop Into the left eye 3 times daily as needed (to flush debris from eye) 1 Bottle 1     famotidine (PEPCID) 20 MG tablet Take 1 tablet (20 mg) by mouth 2 times daily 60 tablet 1     oxyCODONE IR (ROXICODONE) 5 MG tablet Take 1 tablet (5 mg) by mouth every 4 hours as needed for moderate to severe pain 40 tablet 0     LORazepam (ATIVAN) 0.5 MG tablet Take 1 tablet (0.5 mg) by mouth 2 times daily as needed for anxiety 60 tablet 0     loperamide (IMODIUM A-D) 2 MG tablet 2 tab by g tube 3 times daily PRN for diarrhea 30 tablet 0     lactobacillus TABS Take 1 tablet by mouth 3 times daily       Menthol-Zinc Oxide 0.44-20.6 % OINT 1 application topically TID PRN for perianal irritation       Miconazole Nitrate 2 % POWD as needed Apply in folds       ketotifen (ZADITOR/REFRESH ANTI-ITCH) 0.025 % SOLN ophthalmic  solution Place 1 drop Into the left eye 3 times daily 1 Bottle      carvedilol (COREG) 12.5 MG tablet Take 1 tablet (12.5 mg) by mouth 2 times daily (with meals)       Medications reviewed:  Medications reconciled to facility chart and changes were made to reflect current medications as identified as above med list. Below are the changes that were made:   Medications stopped since last EPIC medication reconciliation:   There are no discontinued medications.    Medications started since last [facility] medication reconciliation:  No orders of the defined types were placed in this encounter.    REVIEW OF SYSTEMS:  Unobtainable secondary to cognitive impairment or aphasia, but today pt reports no complaints of pain or shortness of breath.     Physical Exam:  /75  Pulse 69  Temp 98  F (36.7  C)  Resp 18  Wt 172 lb 4.8 oz (78.2 kg)  SpO2 94%  BMI 26.2 kg/m2  GENERAL APPEARANCE:  Alert, in no distress, pleasant, cooperative, oriented x self.  ENT:  Mouth normal, moist mucous membranes, nose normal without drainage or crusting, external ears without lesions, hearing acuity intact  RESP:  respiratory effort and palpation of chest normal, no chest wall tenderness, no respiratory distress, Lung sounds clear, patient is on room air  CV:  Palpation and auscultation of heart done, rate and rhythm controlled and regular, no murmur, no rub or gallop. Edema none bilateral lower extremities.   ABDOMEN:  normal bowel sounds, soft, nontender.  M/S:   Gait and station wheelchair bound, no tenderness or swelling of the joints; generalized weakness all extremities  SKIN:  Inspection and palpation of skin and subcutaneous tissue: skin on extremities warm, dry and intact.   NEURO: cranial nerves 2-12 grossly intact, left facial droop, slow speech, able to follow directions  PSYCH:  insight and judgement impaired, memory impaired, affect and mood flat    Recent Labs:   CBC RESULTS:   Recent Labs   Lab Test  01/18/18   0615  01/12/18    1134   WBC  7.3  11.2*   RBC  3.63*  4.04*   HGB  11.5*  12.8*   HCT  36.5*  41.7   MCV  101*  103*   MCH  31.7  31.7   MCHC  31.5  30.7*   RDW  15.3*  16.8*   PLT  246  398       Last Basic Metabolic Panel:  Recent Labs   Lab Test  01/18/18   0615  01/12/18   1134   NA  140  137   POTASSIUM  4.2  4.6   CHLORIDE  101  101   IZABELLA  9.3  9.7   CO2  30  26   BUN  26  28   CR  1.17  1.06   GLC  83  101*       Liver Function Studies -   Recent Labs   Lab Test  01/12/18   1134  12/18/17   0359   PROTTOTAL  8.6  7.0   ALBUMIN  2.6*  1.5*   BILITOTAL  0.7  1.3   ALKPHOS  106  92   AST  51*  32   ALT  70  30       TSH   Date Value Ref Range Status   01/12/2018 3.20 0.40 - 4.00 mU/L Final   04/03/2016 0.945 mcU/mL Final       Lab Results   Component Value Date    A1C 5.3 12/19/2017    A1C 6.4 10/07/2017       Assessment/Plan:  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  Comment: Completed course of antibiotics, asymptomatic. No further ID follow up is warranted.   Plan: continue to monitor.       Cerebrovascular accident (CVA), unspecified mechanism (H)  Oropharyngeal dysphagia  Comment: has been eating well. Weight has been stable. g tube was removed on 1/19/18.  Plan: continue with monitor weights to ensure there is not weight loss.    Congestive heart failure, NYHA class 2, unspecified congestive heart failure type (H)  Hypertension goal BP (blood pressure) < 140/90  Comment: called by nursing staff yesterday re: weight gain of 2 pounds. He is down two pounds today. euvolemic on exam. Blood pressure controlled. Saw cardiology today and coreg was increased and he is now enrolled with the [facility].. Pre-op EF of 25-30% and will have a repeat echo in March.     Plan: continue to monitor weights and adjust diuretics as needed. Continue with losartan 25 mg q12h, bumex 1 mg BID and coreg 12.5 mg BID.      Major depressive disorder, single episode, moderate (HCC)  Anxiety  Comment: chronic. calm and  cooperative today without anxiety.   Plan: continue with lorazepam 0.5 mg BID PRN, olanzapine 2.5 mg at HS and mirtazapine 30 mg at HS.       Physical deconditioning  Comment: due to underlying chronic medical conditions. Making some, but slow progress in therapy. Currently requires a mechanical lift for transfers.       Electronically signed by  BIBI Connell CNP

## 2018-01-23 NOTE — PROGRESS NOTES
Service Date: 01/23/2018      HISTORY OF PRESENT ILLNESS:     Cricket Miguel is a 62-year-old patient who has had a tragic couple of years.  He is accompanied by his girlfriend today.  He follows with Dr. Garzon and was referred to the [facility].  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  He was doing fine to 2016, but then developed a stroke in 2017 and eventually effectively dehisced his ascending aorta repair, requiring replacement during his last hospitalization.  He now has cardiomyopathy with an ejection fraction around 20%-30% and is very depressed about his extreme left facial droop and right hemiparesis.  He has had a very difficult couple of months.  He was recently seen by Dr. Garzon.  He referred him to the [facility].      He enrolls today.  His emotions are very high and low.  He is here with his girlfriend, who is his advocate.  No weight is available.  At his facility they try to weigh him on a Khushboo lift; however, it does not appear to be accurate.  He recently had the G-tube removed.  The patient's girlfriend states that she thinks he is improving.      He denies increased shortness of breath, orthopnea, syncope or near-syncope.      He is currently at [facility].  He is following a no-added-salt diet.  He is also on a fluid restriction.      PHYSICAL EXAMINATION:    Blood pressure 113/74, pulse is 75, and no weight.   Please see complete physical examination below.      LABORATORY DATA: Basic metabolic panel from 01/18/2018:  Sodium 140, potassium 4.2, BUN 26, creatinine 1.17, GFR 63.      ASSESSMENT AND PLAN:   1.  History of infective endocarditis, status post aortic valve and ascending aortic replacement, subsequent stroke and dehiscence requiring replacement. From a cardiac standpoint, he is doing well.  His cardiovascular medications are appropriate.      2.  Coronary artery disease, status post CABG at the  time of his initial valve replacement. Continue medical therapy.      3.  Stroke with left-sided facial droop and right-sided hemiparesis. Continue rehabilitation.      4.  Cardiomyopathy, valvular and ischemic. Enrolled in the [facility] today.  I have increased carvedilol to 12.5 mg twice a day. Repeat echocardiogram in March with a followup visit with Dr. Garzon.         EMILY BEE, BIBI, CNP             D: 2018   T: 2018   MT: MERCEDEZ      Name:     ASHTYN STROUD   MRN:      [id number]        Account:      [id number]   :      1955           Service Date: 2018      Document: [id number]

## 2018-01-23 NOTE — PATIENT INSTRUCTIONS
Call [facility] nurse for any questions or concerns:  [phone]    1. Weigh yourself daily and write it down.    2. Call [facility] nurse if your weight is up more than 2 pounds in one day or 5 pounds in one week.    3. Call [facility] nurse if you feel more short of breath, have more abdominal bloating, or leg swelling.    4. Continue low sodium diet (less than 2000 mg daily). If you eat less salt, you will retain less fluid.    5. Medication changes: Increase carvedilol to 12.5mg twice daily

## 2018-01-23 NOTE — MR AVS SNAPSHOT
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 Weigh yourself daily and write it down.    2. Call CORE nurse if your weight is up more than 2 pounds in one day or 5 pounds in one week.    3. Call CORE nurse if you feel more short of breath, have more abdominal bloating, or leg swelling.    4. Continue low sodium diet (less than 2000 mg daily). If you eat less salt, you will retain less fluid.    5. Medication changes:               Follow-ups after your visit        Additional Services     Follow-Up with Cardiologist                 Your next 10 appointments already scheduled     Jan 23, 2018  3:30 PM [facility]   Nursing Home with BIBI Sidhu CNP   Geriatrics Transitional Care ([facility])    [address] [place] [id number] [phone]            Apr 02, 2018  1:00 PM CDT   Ech Complete with [id number] ([facility])    [address]  [facility] [id number] [phone]           1. Please bring or wear a comfortable two-piece outfit. 2. You may eat, drink and take your normal medicines. 3. For any questions that cannot be answered, please contact the ordering physician ***Please check-in at the [facility] located in [address] in the [facility] building. When you are finished registering, please go to [address] and have a seat. The  "technician will call your name for the test.            2018  1:45 PM CDT   Return Visit with Alfredito Garzon MD   [facility] ([facility])    [address]  [facility] [address] [phone]              Future tests that were ordered for you today     Open Future Orders        Priority Expected Expires Ordered    Echocardiogram Routine 3/29/2018 2019 2018    Follow-Up with Cardiologist Routine 2018            Who to contact     If you have questions or need follow up information about today's clinic visit or your schedule please contact [facility] directly at [phone].  Normal or non-critical lab and imaging results will be communicated to you by MyChart, letter or phone within 4 business days after the clinic has received the results. If you do not hear from us within 7 days, please contact the clinic through MyChart or phone. If you have a critical or abnormal lab result, we will notify you by phone as soon as possible.  Submit refill requests through [employer] or call your pharmacy and they will forward the refill request to us. Please allow 3 business days for your refill to be completed.          Additional Information About Your Visit        [employer] Information     [employer] lets you send messages to your doctor, view your test results, renew your prescriptions, schedule appointments and more. To sign up, go to [email or web address] . Click on \"Log in\" on the left side of the screen, which will take you to the Welcome page. Then click on \"Sign up Now\" on the right side of the page.     You will be asked to enter the access code listed below, as well as some personal information. Please follow the directions to create your username and password.     Your access code is: [email or web address]  Expires: 2018  7:55 AM     Your access code will  in 90 " "days. If you need help or a new code, please call your [place] clinic or [phone].        Care EveryWhere ID     This is your Care EveryWhere ID. This could be used by other organizations to access your [place] medical records  [id number]        Your Vitals Were     Pulse Height Pulse Oximetry BMI (Body Mass Index)          75 1.727 m (5' 8\") 91% 26.2 kg/m2         Blood Pressure from Last 3 Encounters:   01/23/18 113/74   01/19/18 107/79   01/16/18 110/76    Weight from Last 3 Encounters:   01/16/18 76.7 kg (169 lb 3.2 oz)   01/09/18 78.1 kg (172 lb 3.2 oz)   01/05/18 78.7 kg (173 lb 8 oz)              We Performed the Following     Follow-Up with [facility] - JENNIFER visit          Today's Medication Changes          These changes are accurate as of: 1/23/18  3:03 PM.  If you have any questions, ask your nurse or doctor.               These medicines have changed or have updated prescriptions.        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, Hypertension goal BP (blood pressure) < 140/90   Changed by:  Candice Olivera APRN CNP        Dose:  12.5 mg   Take 1 tablet (12.5 mg) by mouth 2 times daily (with meals)   Refills:  0       senna-docusate 8.6-50 MG per tablet   Commonly known as:  SENOKOT-S;PERICOLACE   This may have changed:  how much to take   Used for:  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        Dose:  1-2 tablet   Take 1-2 tablets by mouth 2 times daily as needed for constipation   Quantity:  100 tablet   Refills:  0                Primary Care Provider Office Phone # Fax #    Dipak Gordillo [phone] [address] [address]        Equal Access to Services     DANIEL BILLS AH: Hadii jenise Almodovar, ricarda kelly, qaybta " freddie pabonjanelle rodríguez ah. [facility] [phone].    ATENCIÓN: Si darwin reece, tiene a mendiola disposición servicios gratuitos de asistencia lingüística. Brandin al [phone].    We comply with applicable federal civil rights laws and [place] laws. We do not discriminate on the basis of race, color, national origin, age, disability, sex, sexual orientation, or gender identity.            Thank you!     Thank you for choosing [facility]  for your care. Our goal is always to provide you with excellent care. Hearing back from our patients is one way we can continue to improve our services. Please take a few minutes to complete the written survey that you may receive in the mail after your visit with us. Thank you!             Your Updated Medication List - Protect others around you: Learn how to safely use, store and throw away your medicines at www.disposemymeds.org.          This list is accurate as of: 1/23/18  3:03 PM.  Always use your most recent med list.                   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, Cerebrovascular accident (CVA) due to other mechanism (H)       atorvastatin 40 MG tablet    LIPITOR    60 tablet    Take 1 tablet (40 mg) by mouth daily    Cerebrovascular accident (CVA) due to other mechanism (H)       bumetanide 1 MG tablet    BUMEX    60 tablet    Take 1 tablet (1 mg) by mouth 2 times daily    Cardiomyopathy, unspecified type (H)       Carboxymethylcellulose Sod PF 0.5 % Soln ophthalmic solution    REFRESH PLUS    1 Bottle    Place 1 drop Into the left eye  3 times daily as needed (to flush debris from eye)    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       carvedilol 12.5 MG tablet    COREG     Take 1 tablet (12.5 mg) by mouth 2 times daily (with meals)    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, Hypertension goal BP (blood pressure) < 140/90       erythromycin 2 % Oint      Externally apply topically 4 times daily        famotidine 20 MG tablet    PEPCID    60 tablet    Take 1 tablet (20 mg) by mouth 2 times daily    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       hydrocortisone 1 % cream    CORTAID     Apply topically 2 times daily        ketotifen 0.025 % Soln ophthalmic solution    ZADITOR/REFRESH ANTI-ITCH    1 Bottle    Place 1 drop Into the left eye 3 times daily        lactobacillus Tabs      Take 1 tablet by mouth 3 times daily        loperamide 2 MG tablet    IMODIUM A-D    30 tablet    2 tab by g tube 3 times daily PRN for diarrhea        LORazepam 0.5 MG tablet    ATIVAN    60 tablet    Take 1 tablet (0.5 mg) by mouth 2 times daily as needed for anxiety    Anxiety       losartan 25 MG tablet    COZAAR    60 tablet    Take 1 tablet (25 mg) by mouth every 12 hours    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, Hypertension goal BP (blood pressure) < 140/90       Menthol-Zinc Oxide 0.44-20.6 % Oint      1 application topically TID PRN for perianal irritation        Miconazole Nitrate 2 % Powd      as needed Apply in folds        mirtazapine 30 MG tablet    REMERON    30 tablet    Take 1 tablet (30 mg) by mouth At Bedtime    Depression, unspecified depression type       multivitamin, therapeutic with minerals Tabs tablet     30 each    Take 1 tablet by mouth daily    Cerebrovascular accident (CVA) due to other mechanism (H)       OLANZapine 2.5 MG tablet    zyPREXA    60 tablet    Take 1 tablet (2.5 mg) by mouth At Bedtime    Cerebrovascular  accident (CVA) due to other mechanism (H), Anxiety, Delirium due to another medical condition       ondansetron 4 MG ODT tab    ZOFRAN-ODT    120 tablet    Take 1 tablet (4 mg) by mouth every 6 hours as needed for nausea or vomiting    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       oxyCODONE IR 5 MG tablet    ROXICODONE    40 tablet    Take 1 tablet (5 mg) by mouth every 4 hours as needed for moderate to severe pain    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

## 2018-01-23 NOTE — TELEPHONE ENCOUNTER
Patient still admitted to TCU.    Merary Kiran, BS, RN, N  [facility]) [phone]

## 2018-01-31 NOTE — PROGRESS NOTES
[facility]    Chief Complaint   Patient presents with     RECHECK       HPI:    Cricket Miguel is a 62 year old  (1955), who is being seen today for an episodic care visit at [facility].  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 Requesting to discontinue air mattress. Patient denies any pain, requesting to be left alone, taking a nap.    ALLERGIES: Lisinopril  Past Medical, Surgical, Family and Social History reviewed and updated in EPIC.    Current Outpatient Prescriptions   Medication Sig Dispense Refill     carvedilol (COREG) 12.5 MG tablet Take 1 tablet (12.5 mg) by mouth 2 times daily (with meals)       OLANZapine (ZYPREXA) 2.5 MG tablet Take 1 tablet (2.5 mg) by mouth At Bedtime 60 tablet 0     acetaminophen (TYLENOL) 32 mg/mL solution 20.3 mLs (650 mg) by Per NG tube route every 4 hours as needed for mild pain 400 mL 1     aspirin 81 MG chewable tablet 1 tablet (81 mg) by Oral or Feeding Tube route daily 60 tablet 1     mirtazapine (REMERON) 30 MG tablet Take 1 tablet (30 mg) by mouth At Bedtime 30 tablet 1     ondansetron (ZOFRAN-ODT) 4 MG ODT tab Take 1 tablet (4 mg) by mouth every 6 hours as needed for nausea or vomiting 120 tablet 1     atorvastatin (LIPITOR) 40 MG tablet Take 1 tablet (40 mg) by mouth daily 60 tablet 1     losartan (COZAAR) 25 MG tablet Take 1 tablet (25 mg) by mouth every 12 hours 60 tablet 1     bumetanide (BUMEX) 1 MG tablet Take 1 tablet (1 mg) by mouth 2 times daily 60 tablet 0     senna-docusate  (SENOKOT-S;PERICOLACE) 8.6-50 MG per tablet Take 1-2 tablets by mouth 2 times daily as needed for constipation 100 tablet 0     potassium chloride (KAYCIEL) 20 MEQ/15ML (10%) solution Take 15 mLs (20 mEq) by mouth 2 times daily 900 mL 0     multivitamin, therapeutic with minerals (THERA-VIT-M) TABS tablet Take 1 tablet by mouth daily 30 each 0     Carboxymethylcellulose Sod PF (REFRESH PLUS) 0.5 % SOLN ophthalmic solution Place 1 drop Into the left eye 3 times daily as needed (to flush debris from eye) 1 Bottle 1     famotidine (PEPCID) 20 MG tablet Take 1 tablet (20 mg) by mouth 2 times daily 60 tablet 1     oxyCODONE IR (ROXICODONE) 5 MG tablet Take 1 tablet (5 mg) by mouth every 4 hours as needed for moderate to severe pain 40 tablet 0     LORazepam (ATIVAN) 0.5 MG tablet Take 1 tablet (0.5 mg) by mouth 2 times daily as needed for anxiety 60 tablet 0     loperamide (IMODIUM A-D) 2 MG tablet 2 tab by g tube 3 times daily PRN for diarrhea 30 tablet 0     lactobacillus TABS Take 1 tablet by mouth 3 times daily       Menthol-Zinc Oxide 0.44-20.6 % OINT 1 application topically TID PRN for perianal irritation       Miconazole Nitrate 2 % POWD as needed Apply in folds       Medications reviewed:  Medications reconciled to facility chart and changes were made to reflect current medications as identified as above med list. Below are the changes that were made:   Medications stopped since last EPIC medication reconciliation:   Medications Discontinued During This Encounter   Medication Reason     ketotifen (ZADITOR/REFRESH ANTI-ITCH) 0.025 % SOLN ophthalmic solution Medication Reconciliation Clean Up       Medications started since last [facility] medication reconciliation:  No orders of the defined types were placed in this encounter.      REVIEW OF SYSTEMS:  4 point ROS including Respiratory, CV, GI and , other than that noted in the HPI,  is negative    Physical Exam:  /74  Pulse 60  Temp 98.5  F (36.9  C)  Resp 16  " Ht 5' 8\" (1.727 m)  Wt 165 lb (74.8 kg)  SpO2 94%  BMI 25.09 kg/m2  GENERAL APPEARANCE:  Alert, in no distress, cooperative, laying in bed  ENT:  Mouth and posterior oropharynx normal, moist mucous membranes, normal hearing acuity  EYES:  EOM, conjunctivae, lids, pupils and irises normal, EOM normal, conjunctiva and lids normal  RESP:  respiratory effort and palpation of chest normal, lungs clear to auscultation , no respiratory distress  CV:  Palpation and auscultation of heart done , regular rate and rhythm, no murmur, rub, or gallop, no edema  ABDOMEN:  normal bowel sounds, soft, nontender, no hepatosplenomegaly or other masses, no guarding or rebound  M/S:   Gait and station abnormal unsteady gait  SKIN:  Inspection of skin and subcutaneous tissue baseline, Palpation of skin and subcutaneous tissue baseline  NEURO:   Cranial nerves 2-12 are normal tested and grossly at patient's baseline  PSYCH:  oriented to x2, normal insight, judgement and memory, insight and judgement impaired, anger outbursts with yelling at staff  and family noted earlier today     BP 01/24-01/31: 100-142/66-93 mmHg    Recent Labs:     CBC RESULTS:   Recent Labs   Lab Test  01/18/18   0615  01/12/18   1134   WBC  7.3  11.2*   RBC  3.63*  4.04*   HGB  11.5*  12.8*   HCT  36.5*  41.7   MCV  101*  103*   MCH  31.7  31.7   MCHC  31.5  30.7*   RDW  15.3*  16.8*   PLT  246  398       Last Basic Metabolic Panel:  Recent Labs   Lab Test  01/18/18   0615  01/12/18   1134   NA  140  137   POTASSIUM  4.2  4.6   CHLORIDE  101  101   IZABELLA  9.3  9.7   CO2  30  26   BUN  26  28   CR  1.17  1.06   GLC  83  101*       Liver Function Studies -   Recent Labs   Lab Test  01/12/18   1134  12/18/17   0359   PROTTOTAL  8.6  7.0   ALBUMIN  2.6*  1.5*   BILITOTAL  0.7  1.3   ALKPHOS  106  92   AST  51*  32   ALT  70  30       TSH   Date Value Ref Range Status   01/12/2018 3.20 0.40 - 4.00 mU/L Final   04/03/2016 0.945 mcU/mL Final       Lab Results   Component " Value Date    A1C 5.3 12/19/2017    A1C 6.4 10/07/2017         Assessment/Plan:    (I39) 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 Completed antibiotics, asymptomatic  Plan: monitor.    (I63.9) Cerebrovascular accident (CVA), unspecified mechanism (H)  (R13.12) Oropharyngeal dysphagia  (R53.81) Physical deconditioning  Comment: improving. Eating orally adequate amounts. GT removed 1/19.   Plan: cont speech therapy, OT for cognition  -cont daily ASA, lipitor  -cont PT, SW for discharge planning and care conferences    (I50.9) Congestive heart failure, NYHA class 2, unspecified congestive heart failure type (H)  (I10) Hypertension goal BP (blood pressure) < 140/90  Comment: improving. Follows with [facility] clinic. Weights are inconsistent but asymptomatic. BMP today, slight increased Creatinine.  Plan: cont bumex, coreg, losartan  -will decrease bumex to once a day  -recheck bmp 2/5    (F32.1) Major depressive disorder, single episode, moderate (HCC)  (F41.9) Anxiety   Comment: Has outbursts of anger ~ 1 daily.  Plan: cont olanzapine, mirtazapine both scheduled. Will continue current doses as is.  -seldom use of lorazepam, will discontinue soon.  -institute some quite time to minimize overstimulation    COPD  Hx of tobacco use disorder  No longer smoking  Has been having wheezing and sob occasionally   -albuterol neb treatment qid    Ectropion of left lower eyelid  Comment: improving. No erythema noted or drainage  Plan: cont artificial tears scheduled and ointment at HS    Orders:  Bmp 2/5  Daily weights, double check accuracy  Encourage fluid intake  -discontinue air mattress  -heel protector to right heel when in bed  -Monitor skin intergrity if open  Areas re-institute air mattress    Total time spent with patient visit at the skilled nursing facility was 25 min including patient visit and review of past records. Greater than 50% of total  time spent with counseling and coordinating care due to copd    Electronically signed by  Arina Brito

## 2018-01-31 NOTE — TELEPHONE ENCOUNTER
Date Forms was received: January 31, 2018    Forms received by: Fax    Purpose of Form:  [facility] Discharge orders    How the form needs to be returned for patient:  Fax    Form currently placed  [place] [employer]

## 2018-01-31 NOTE — TELEPHONE ENCOUNTER
Reason for Call: Request for an order or referral:    Order or referral being requested: CBC lab    Date needed: as soon as possible    Has the patient been seen by the PCP for this problem? YES    Additional comments: Pt had his blood drawn already for CBC [facility] just needs an order from Dr. Gordillo to process it.     Phone number Patient can be reached at:  Home number on file [phone] (home)    Best Time:      Can we leave a detailed message on this number?  YES    Call taken on 1/31/2018 at 10:14 AM by Florence Pena

## 2018-02-01 NOTE — TELEPHONE ENCOUNTER
Completed forms faxed back to [facility] at [phone].   Originals sent to be scanned.     Marge Heath

## 2018-02-03 NOTE — TELEPHONE ENCOUNTER
Patient's wife called in to discuss weight gain.  She notes that his weight as measured at his current TCU has been increasing by a few pounds.  She reports he has not had any increased extremity swelling.  He has had a slight wheeze but has also had URI symptoms for the past few days.  He has not been consistent with nebulizer usage.    I recommended he continue with nebulizer use as needed, and to watch for signs of increasing edema or shortness of breath.  If so then he should present to the nearest ED for evaluation.  The wife expresses understanding and agreement with this plan.

## 2018-02-06 NOTE — PROGRESS NOTES
Received a call from the patient's SO Meghna stating that the patient is complaining of shortness of breath that feels the same as prior to his surgery.  Meghna states the patient has no swelling, his labs are all normal, he does have a mild cold but is afebrile.  Patient's weights are not consistent, he is being weighed with a shelia lift and the weights range from 165 to 169.8.  This writer called the care facility and spoke to the patient's nurse and she states the patient denies shortness of breath but does have audible wheezing. Asked about the Flu and the nurse states all patient's in the facility were given Tamaflu to prevent an outbreak with the patient's and staff. Patient's nurse also states patient is being very combative today and will not cooperate with his cares. Patient was recently seen in [facility] in [facility] on 01/23, and no changes to care were made.     Called Meghna back and informed her that the NP on staff is aware of the patient's wheezing and is planing on seeing him today to assess his status.  Meghna verbalized understanding and will call with further questions or concerns.

## 2018-02-09 NOTE — TELEPHONE ENCOUNTER
pt's daughter is interested Evert Tyler MD  Suggestions and would like them to get in touch with pt     Updated the parking disability form     Jane Pizarro RN, BSN  [facility]

## 2018-02-09 NOTE — TELEPHONE ENCOUNTER
Reason for Call:  Other     Detailed comments: Eloy is calling wanting to speak to Jane or Marge. Couldn't reach either. She would like a call back.     Phone Number Patient can be reached at: Cell number on file:    Telephone Information:   Mobile [phone]     Best Time: Anytime    Can we leave a detailed message on this number? YES    Call taken on 2/9/2018 at 1:31 PM by Shabana Denson

## 2018-02-09 NOTE — TELEPHONE ENCOUNTER
Called Eloy MARTINEZ - left a non detailed VM - need to discuss handicap parking sticker     Jane Pizarro RN, BSN  [facility]

## 2018-02-13 NOTE — PROGRESS NOTES
[facility] DISCHARGE SUMMARY    PATIENT'S NAME: Cricket Miguel  YOB: 1955  MEDICAL RECORD NUMBER:  [id number]    PRIMARY CARE PROVIDER AND CLINIC RESPONSIBLE AFTER TRANSFER: Dipak Gordillo [id number] [facility] [id number]    CODE STATUS/ADVANCE DIRECTIVES DISCUSSION:   CPR/Full code        Allergies   Allergen Reactions     Lisinopril Swelling     One-sided facial swelling after being on lisinopril/HCTZ for one week.        TRANSFERRING PROVIDERS: BIBI Zhang CNP, Karla Alcaraz MD  DATE OF SNF ADMISSION:  January / 04 / 2018  DATE OF SNF (anticipated) DISCHARGE: February / 16 / 2018  DISCHARGE DISPOSITION: FM Provider   Nursing Facility: [facility] stay 12/16/2017 through 01/04/2018.        Condition on Discharge:  Improving.  Function: Groom/Hyg: max, UB Dressing: max, LB Dressing: max, Toileting: max, Tub/Shower: max, Bed Mob: mod, Transfers: max, Ambulates: stood max A in 11 bars 2-5 min  Cognitive Scores: SLUMS 10/30    Equipment: N/A    DISCHARGE DIAGNOSIS:   1. 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 Cerebrovascular accident (CVA), unspecified mechanism (H)    3. Oropharyngeal dysphagia    4. Physical deconditioning    5. Congestive heart failure, NYHA class 2, unspecified congestive heart failure type (H)    6. Hypertension goal BP (blood pressure) < 140/90    7. Major depressive disorder, single episode, moderate (HCC)    8. Anxiety    9. Chronic obstructive pulmonary disease, unspecified COPD type (H)    10. Personal history of tobacco use, presenting hazards to health    11. Ectropion due to laxity of eyelid, left        HPI Nursing Facility Course:  HPI information obtained from: facility chart records.    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  Completed antibiotics, asymptomatic  Plan: monitor.     (I63.9) Cerebrovascular accident (CVA), unspecified mechanism (H)  (R13.12) Oropharyngeal dysphagia  (R53.81) Physical deconditioning  Comment: improving. Eating orally adequate amounts. GT removed 1/19. Easily agitated.   Plan: cont speech therapy, OT for cognition  -cont daily ASA, lipitor  -cont PT, OT     (I50.9) Congestive heart failure, NYHA class 2, unspecified congestive heart failure type (H)  (I10) Hypertension goal BP (blood pressure) < 140/90  Comment: improving. [facility]s [facility]. Weights are inconsistent but asymptomatic. Creatinine trending down 1.28 on 2/5. Bumex decreased to daily.  Plan: cont bumex, coreg, losartan  -will decrease bumex to once a day  -recheck bmp 3-5 days     (F32.1) Major depressive disorder, single episode, moderate (HCC)  (F41.9) Anxiety   Comment: Has outbursts of yelling, anger, frustration ~ 1 daily.  Plan: cont olanzapine, mirtazapine both scheduled. Will continue current doses as is.  -seldom use of lorazepam, will discontinue soon.  -institute some quite time to minimize overstimulation     COPD  Hx of tobacco use disorder  No longer smoking  Has been having wheezing and sob occasionally. Was receiving albuterol neb treatment qid with good effect. But refused to continue using   -encourage pulmonary toileting    Ectropion of left lower eyelid  Comment: improving. No erythema noted or drainage  Plan: cont artificial tears scheduled and ointment at HS as needed    PAST MEDICAL HISTORY:  has a past medical history of Abscess of aortic root (09/2017); AI (aortic insufficiency); Anxiety; Aortic root dilatation (H); AR (aortic regurgitation); Cardiomyopathy (H); CHF (congestive heart failure), NYHA class II (H); COPD, mild (H); CVA (cerebral vascular accident) (H) (09/2017); Depression (09/2017); Heart murmur; Hyperlipidemia LDL goal <70 (10/31/2010); Hypertension goal BP (blood pressure) < 140/90; Inguinal hernia;  left lung nodule  (1/29/2008); Major depressive disorder, single episode, moderate (H); Psoriasis (childhood); and Tobacco use disorder. He also has no past medical history of Difficulty in walking(719.7); History of blood transfusion; or Malignant hyperthermia.    DISCHARGE MEDICATIONS:  Current Outpatient Prescriptions   Medication Sig Dispense Refill     albuterol (2.5 MG/3ML) 0.083% neb solution Take 1 vial by nebulization 4 times daily       BUMETANIDE PO Take 1 mg by mouth daily       carvedilol (COREG) 12.5 MG tablet Take 1 tablet (12.5 mg) by mouth 2 times daily (with meals)       OLANZapine (ZYPREXA) 2.5 MG tablet Take 1 tablet (2.5 mg) by mouth At Bedtime 60 tablet 0     acetaminophen (TYLENOL) 32 mg/mL solution 20.3 mLs (650 mg) by Per NG tube route every 4 hours as needed for mild pain 400 mL 1     aspirin 81 MG chewable tablet 1 tablet (81 mg) by Oral or Feeding Tube route daily 60 tablet 1     mirtazapine (REMERON) 30 MG tablet Take 1 tablet (30 mg) by mouth At Bedtime 30 tablet 1     ondansetron (ZOFRAN-ODT) 4 MG ODT tab Take 1 tablet (4 mg) by mouth every 6 hours as needed for nausea or vomiting 120 tablet 1     atorvastatin (LIPITOR) 40 MG tablet Take 1 tablet (40 mg) by mouth daily 60 tablet 1     losartan (COZAAR) 25 MG tablet Take 1 tablet (25 mg) by mouth every 12 hours 60 tablet 1     senna-docusate (SENOKOT-S;PERICOLACE) 8.6-50 MG per tablet Take 1-2 tablets by mouth 2 times daily as needed for constipation 100 tablet 0     potassium chloride (KAYCIEL) 20 MEQ/15ML (10%) solution Take 15 mLs (20 mEq) by mouth 2 times daily 900 mL 0     multivitamin, therapeutic with minerals (THERA-VIT-M) TABS tablet Take 1 tablet by mouth daily 30 each 0     Carboxymethylcellulose Sod PF (REFRESH PLUS) 0.5 % SOLN ophthalmic solution Place 1 drop Into the left eye 3 times daily as needed (to flush debris from eye) 1 Bottle 1     famotidine (PEPCID) 20 MG tablet Take 1 tablet (20 mg) by mouth 2 times daily 60  "tablet 1     loperamide (IMODIUM A-D) 2 MG tablet 2 tab by g tube 3 times daily PRN for diarrhea 30 tablet 0     lactobacillus TABS Take 1 tablet by mouth 3 times daily       Menthol-Zinc Oxide 0.44-20.6 % OINT 1 application topically TID PRN for perianal irritation       Miconazole Nitrate 2 % POWD as needed Apply in folds         MEDICATION CHANGES/RATIONALE:   Decreased bumex from BID to daily s/s increasing Creatinine  Discontinued albuterol nebs    Controlled medications sent with patient:   not applicable/none     ROS:    4 point ROS including Respiratory, CV, GI and , other than that noted in the HPI,  is negative    Physical Exam:   Vitals: BP (!) 136/98  Pulse 69  Temp 97.4  F (36.3  C)  Resp 16  Ht 5' 8\" (1.727 m)  Wt 175 lb 14.4 oz (79.8 kg)  SpO2 94%  BMI 26.75 kg/m2  BMI= Body mass index is 26.75 kg/(m^2).    GENERAL APPEARANCE:  Alert, in no distress, uncooperative, laying in bed  ENT:  Mouth and posterior oropharynx normal, moist mucous membranes, normal hearing acuity  EYES:  EOM, conjunctivae, lids, pupils and irises normal, EOM normal, conjunctiva and lids normal  RESP:  respiratory effort and palpation of chest normal, lungs diminished throughout both lung fields, no respiratory distress  CV:  Palpation and auscultation of heart done , regular rate and rhythm, no murmur, rub, or gallop, no edema  ABDOMEN:  normal bowel sounds, soft, nontender, no hepatosplenomegaly or other masses, no guarding or rebound  M/S:   Gait and station abnormal unsteady gait, wheelchair bound  SKIN:  Inspection of skin and subcutaneous tissue baseline, Palpation of skin and subcutaneous tissue baseline  NEURO:   Cranial nerves 2-12 are normal tested and grossly at patient's baseline  PSYCH:  oriented to x2, insight and judgement impaired, yelling at provider does not want to be assessed    DISCHARGE PLAN:  Occupational Therapy, Physical Therapy, Speech Therapy , Registered Nurse, Home Health Aide and From:  " [facility]  Patient instructed to follow-up with:  PCP in 7 days      Current [facility] scheduled appointments:  Future Appointments  Date Time Provider Department Center   4/2/2018 1:00 PM [id number]   4/5/2018 1:45 PM Alfredito Garzon MD [facility]       MTM referral needed and placed by this provider: No    Pending labs: none  SNF labs   CBC RESULTS:   Recent Labs   Lab Test  01/31/18   1005  01/18/18   0615   WBC  8.0  7.3   RBC  3.67*  3.63*   HGB  11.8*  11.5*   HCT  36.3*  36.5*   MCV  99  101*   MCH  32.2  31.7   MCHC  32.5  31.5   RDW  13.7  15.3*   PLT  252  246       Last Basic Metabolic Panel:  Recent Labs   Lab Test  02/05/18   0705  01/31/18   1005   NA  142  138   POTASSIUM  4.4  4.0   CHLORIDE  106  101   IZABELLA  9.5  9.0   CO2  30  27   BUN  26  25   CR  1.28*  1.34*   GLC  93  123*       Liver Function Studies -   Recent Labs   Lab Test  01/12/18   1134  12/18/17   0359   PROTTOTAL  8.6  7.0   ALBUMIN  2.6*  1.5*   BILITOTAL  0.7  1.3   ALKPHOS  106  92   AST  51*  32   ALT  70  30       TSH   Date Value Ref Range Status   01/12/2018 3.20 0.40 - 4.00 mU/L Final   04/03/2016 0.945 mcU/mL Final       Lab Results   Component Value Date    A1C 5.3 12/19/2017    A1C 6.4 10/07/2017       Discharge Treatments:none    TOTAL DISCHARGE TIME:   Greater than 30 minutes  Electronically signed by:  BIBI Zhang CNP

## 2018-02-14 NOTE — TELEPHONE ENCOUNTER
Date Forms was received: February 14, 2018    Forms received by: Fax    Purpose of Form:  Wheelchair orders    How the form needs to be returned for patient:  Fax    Form currently placed  [place]

## 2018-02-15 NOTE — TELEPHONE ENCOUNTER
Message handled by Nurse Triage with [facility] - provider name: MD RADHA - needs to be seen soon. Please find out where pt can get in sooner.    Called [facility] - [place] [phone]  Stated first available appt is 06/06/2018. Dr. Gordillo can call Provider to provider # [phone] to try and get patient in sooner    Called [facility] @ [phone] - first available is 02/26/2018  OV scheduled for 02/26/2018 @ 2pm (Dr. Efren Lucero)    Requesting results be faxed to [phone] (done)    Called [facility] @ [phone] - advised of appt information.   Appt for 06/06 canceled.     Joycelyn Jones RN  [facility] Triage

## 2018-02-15 NOTE — TELEPHONE ENCOUNTER
Patient's SO, Meghna Hull, calling to report that they could not get in with ENT until 6/6/2018.  She also said he will be discharging from TCU tomorrow.  Follow up scheduled with  for 2/19/2018.        Merary Kiran, BS, RN, N  [facility]) [phone]

## 2018-02-18 NOTE — PROGRESS NOTES
[facility] now requests orders and shares plan of care/discharge summaries for some patients through [employer].  Please REPLY TO THIS MESSAGE in order to give authorization for orders when needed.  This is considered a verbal order, you will still receive a faxed copy of orders for signature.  Thank you for your assistance in improving collaboration for our patients.    ORDER SN 1w5, 3 as needed visits for disease education and management, medication education and managmeent, home safety, caregiver education, lab draws, and diet and nutrition. PT/ST/OT to evaluate and treat to include s/p CVA therapy. ST for dysphagia. PT Strength and mobility, transfers, fall safety, and adaptive techniques, home exercise program, and caregiver education. OT for ADL/IADL safety, adaptive equipment and techniques to include hospital bed, caregiver, and endurance.    MD SUMMARY/PLAN OF CARE Patient is home from hospitalization following cardiac endocarditis, encephalitis, and CVA. He presents as very lethargic. His primary caregiver reports he is sleeping more than normal since coming home. He is easily arrousable, tires easily. He asks his significant other to sign his plan of care after he signed the consent related to his being tired. He is easily agitated with stimulation. He agress to services with limited interaction to meet his therapy and rehabilitation needs. He was started on Zyprexa, remeron dose increased, and started on Carvedilol during hospitalization. He is to have an ECHO followup in April with cardiology. He is missing his dentures which allow him to current be able to chew but not able to bite. FOods are needed to be softened. During hospitalization his gtube was removed and he takes medications by mouth. He wants a hospital bed and assistance to obtain side rails and grab bar placement within bathroom. SN recommended to have more frequent SN visits to meet needs, evaluation of condition since home  from hospital/TCU, and caregiver education, however was declined related to insurance and wanting to use most of allowed visits for therapy needs.

## 2018-02-19 NOTE — PATIENT INSTRUCTIONS
Decrease Remeron to 22.5 mg daily    Try Potassium pill instead of in liquid    Follow up in ~1 month    [facility]                        To reach your care team during and after hours:   [phone]  To reach our pharmacy:        [phone]    Clinic Hours                        Our clinic hours are:    Monday   7:30 am to 7:00 pm                  Tuesday through Friday 7:30 am to 5:00 pm                             Saturday   8:00 am to 12:00 pm      Sunday   Closed      Pharmacy Hours                        Our pharmacy hours are:    Monday   8:30 am to 7:00 pm       Tuesday to Friday  8:30 am to 6:00 pm                       Saturday    9:00 am to 1:00 pm              Sunday    Closed              There is also information available at our web site:  [email or web address]    If your provider ordered any lab tests and you do not receive the results within 10 business days, please call the clinic.    If you need a medication refill please contact your pharmacy.  Please allow 2-3 business days for your refill to be completed.    Our clinic offers telephone visits and e visits.  Please ask one of your team members to explain more.      Use [employer] (secure email communication and access to your chart) to send your primary care provider a message or make an appointment. Ask someone on your Team how to sign up for [employer].  Immunizations                      Immunization History   Administered Date(s) Administered     Influenza Vaccine IM 3yrs+ 4 Valent IIV4 10/28/2017     Pneumococcal 23 valent 11/04/2017     TD (ADULT, 7+) 04/14/1999        Health Maintenance                         Health Maintenance Due   Topic Date Due     Heart Failure Action Plan Reviewed Every 3 Years  1955     Wellness Visit with your Primary Provider - yearly  1955     COPD ACTION PLAN Q1 YR  1955     Colon Cancer Screening - FIT Test - yearly  03/16/1963     Hepatitis C Screening  03/16/1971      Microalbumin Lab - yearly  02/07/2013     Depression Action Plan Review - yearly  05/06/2014     Depression Assessment - every 6 months  12/03/2016     Cholesterol Lab - yearly  04/03/2017     Prostate Test (PSA) - yearly  11/30/2017

## 2018-02-19 NOTE — TELEPHONE ENCOUNTER
Patient discharged from [facility] for inpatient hospital stay on 2/16 for endocarditis and heart valve disorders.    appointment scheduled today - no call needed.    ER / IP:  3/2    Care Coordination:  active      Marge Heath

## 2018-02-19 NOTE — TELEPHONE ENCOUNTER
Name of caller: Meghna  Relationship to Patient: sig other    Reason for Call:  Wondering about stroke support groups in area.  Please advise.    Best phone number to reach pt at is: [phone]  Ok to leave a message with medical info? yes    Marge Heath

## 2018-02-19 NOTE — MR AVS SNAPSHOT
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    -  1    Cerebrovascular accident (CVA), unspecified mechanism (H)        Abscess of aortic root        Chronic systolic heart failure (H)        S/P AVR (aortic valve replacement)        H/O aortic root repair        Weakness        Hypokalemia        COPD, mild (H)        Mild single current episode of major depressive disorder (H)        Anxiety        Cardiomyopathy, unspecified type (H)        Hypertension goal BP (blood pressure) < 140/90        Hyperlipidemia LDL goal <70        Medication monitoring encounter          Care Instructions    Decrease Remeron to 22.5 mg daily    Try Potassium pill instead of in liquid    Follow up in ~1 month    [facility]                        To reach your care team during and after hours:   [phone]  To reach our pharmacy:        [phone]    Clinic Hours                        Our clinic hours are:    Monday   7:30 am to 7:00 pm                  Tuesday through Friday 7:30 am to 5:00 pm                             Saturday   8:00 am to 12:00 pm      Sunday   Closed      Pharmacy Hours                        Our pharmacy hours are:    Monday   8:30 am to 7:00 pm       Tuesday to Friday  8:30 am to 6:00 pm                       Saturday    9:00 am to 1:00 pm              Sunday    Closed              There is also information available at our web site:  [email or web address]    If your provider ordered any lab tests and you do not receive the results within 10 business days, please call the clinic.    If you need a medication refill please contact your pharmacy.  Please allow 2-3  business days for your refill to be completed.    Our clinic offers telephone visits and e visits.  Please ask one of your team members to explain more.      Use TOTEMS (formerly Nitrogram)hart (secure email communication and access to your chart) to send your primary care provider a message or make an appointment. Ask someone on your Team how to sign up for [employer].  Immunizations                      Immunization History   Administered Date(s) Administered     Influenza Vaccine IM 3yrs+ 4 Valent IIV4 10/28/2017     Pneumococcal 23 valent 11/04/2017     TD (ADULT, 7+) 04/14/1999        Health Maintenance                         Health Maintenance Due   Topic Date Due     Heart Failure Action Plan Reviewed Every 3 Years  1955     Wellness Visit with your Primary Provider - yearly  1955     COPD ACTION PLAN Q1 YR  1955     Colon Cancer Screening - FIT Test - yearly  03/16/1963     Hepatitis C Screening  03/16/1971     Microalbumin Lab - yearly  02/07/2013     Depression Action Plan Review - yearly  05/06/2014     Depression Assessment - every 6 months  12/03/2016     Cholesterol Lab - yearly  04/03/2017     Prostate Test (PSA) - yearly  11/30/2017               Follow-ups after your visit        Follow-up notes from your care team     Return in about 1 month (around 3/19/2018).      Your next 10 appointments already scheduled     Mar 16, 2018  3:00 PM CDT   New Patient Visit with Kelly Gardner MD   [facility] ([facility])    [address]  [place] [address]   [phone]            Apr 02, 2018  1:00 PM CDT   Ech Complete with [id number] [facility] [place] [facility] ([facility])    [id number] [place] [id number] [phone]           1. Please bring or wear a comfortable two-piece outfit. 2. You may eat, drink and take your normal medicines. 3. For any questions that cannot be answered, please contact the ordering physician  "***Please check-in at the [facility] Registration Office located in [address] in the [facility] building. When you are finished registering, please go to [address] and have a seat. The technician will call your name for the test.            Apr 05, 2018  1:45 PM CDT   Return Visit with Alfredito Garzon MD   [facility] ([facility])    [address]  [facility] [address] [phone]              Who to contact     If you have questions or need follow up information about today's clinic visit or your schedule please contact [facility] directly at [phone].  Normal or non-critical lab and imaging results will be communicated to you by Intellinotehart, letter or phone within 4 business days after the clinic has received the results. If you do not hear from us within 7 days, please contact the clinic through Bandcampt or phone. If you have a critical or abnormal lab result, we will notify you by phone as soon as possible.  Submit refill requests through [email or web address] or call your pharmacy and they will forward the refill request to us. Please allow 3 business days for your refill to be completed.          Additional Information About Your Visit        [email or web address] Information     [email or web address] gives you secure access to your electronic health record. If you see a primary care provider, you can also send messages to your care team and make appointments. If you have questions, please call your primary care clinic.  If you do not have a primary care provider, please call [phone] and they will assist you.        Care EveryWhere ID     This is your Care EveryWhere ID. This could be used by other organizations to access your [facility] medical records  [id number]        Your Vitals Were     Pulse Temperature Height Pulse Oximetry BMI (Body Mass Index)       68 97.5  F (36.4  C) (Oral) 5' 8\" (1.727 m) 96% 27.22 kg/m2        Blood Pressure from " Last 3 Encounters:   02/19/18 120/76   02/13/18 (!) 136/98   01/31/18 106/74    Weight from Last 3 Encounters:   02/19/18 179 lb (81.2 kg)   02/13/18 175 lb 14.4 oz (79.8 kg)   01/31/18 165 lb (74.8 kg)              We Performed the Following     CBC with platelets     Comprehensive metabolic panel     Ferritin     Iron and iron binding capacity     T4, free     TSH     Vitamin B12     Vitamin D Deficiency          Today's Medication Changes          These changes are accurate as of 2/19/18 11:59 PM.  If you have any questions, ask your nurse or doctor.               Start taking these medicines.        Dose/Directions    Potassium Chloride ER 20 MEQ Tbcr   Used for:  Hypokalemia   Replaces:  potassium chloride 20 MEQ/15ML (10%) solution   Started by:  Dipak Gordillo MD        Dose:  20 mEq   Take 1 tablet (20 mEq) by mouth 2 times daily   Quantity:  60 tablet   Refills:  3         These medicines have changed or have updated prescriptions.        Dose/Directions    mirtazapine 7.5 MG Tabs tablet   Commonly known as:  REMERON   This may have changed:    - medication strength  - how much to take   Used for:  Mild single current episode of major depressive disorder (H), Anxiety   Changed by:  Dipak Gordillo MD        Dose:  22.5 mg   Take 3 tablets (22.5 mg) by mouth At Bedtime   Quantity:  90 tablet   Refills:  3         Stop taking these medicines if you haven't already. Please contact your care team if you have questions.     potassium chloride 20 MEQ/15ML (10%) solution   Commonly known as:  KAYCIEL   Replaced by:  Potassium Chloride ER 20 MEQ Tbcr   Stopped by:  Dipak Gordillo MD                Where to get your medicines      These medications were sent to [id number]  [place], [place] - [address]  [address] [place] [address] [address], [facility] [address]    Hours:  24-hours Phone:  [phone]     mirtazapine 7.5 MG Tabs tablet    Potassium Chloride ER 20 MEQ Tbcr                 Primary Care Provider Office Phone # Fax #    Dipak Gordillo [phone] [address] [address]        Equal Access to Services     DANIEL BILLS : Hadliang jenise nova rashelo Sonico, waaxda luqadaha, qaybta kaalmada vidhyada, freddie ferreira jonathonjanelle rea marcos waggoner. So [facility] [phone].    ATENCIÓN: Si habla español, tiene a mendiola disposición servicios gratuitos de asistencia lingüística. Llame al [phone].    We comply with applicable federal civil rights laws and [place] laws. We do not discriminate on the basis of race, color, national origin, age, disability, sex, sexual orientation, or gender identity.            Thank you!     Thank you for choosing [facility]  for your care. Our goal is always to provide you with excellent care. Hearing back from our patients is one way we can continue to improve our services. Please take a few minutes to complete the written survey that you may receive in the mail after your visit with us. Thank you!             Your Updated Medication List - Protect others around you: Learn how to safely use, store and throw away your medicines at www.disposemymeds.org.          This list is accurate as of 2/19/18 11:59 PM.  Always use your most recent med list.                   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, Cerebrovascular accident (CVA) due to other mechanism (H)       atorvastatin 40 MG tablet    LIPITOR    60 tablet    Take 1 tablet (40 mg) by mouth daily    Cerebrovascular accident (CVA) due to other mechanism (H)       BUMETANIDE PO      Take 1 mg by mouth daily         Carboxymethylcellulose Sod PF 0.5 % Soln ophthalmic solution    REFRESH PLUS    1 Bottle    Place 1 drop Into the left eye 3 times daily as needed (to flush debris from eye)    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       carvedilol 12.5 MG tablet    COREG     Take 1 tablet (12.5 mg) by mouth 2 times daily (with meals)    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, Hypertension goal BP (blood pressure) < 140/90       famotidine 20 MG tablet    PEPCID    60 tablet    Take 1 tablet (20 mg) by mouth 2 times daily    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       lactobacillus Tabs      Take 1 tablet by mouth 3 times daily        loperamide 2 MG tablet    IMODIUM A-D    30 tablet    2 tab by g tube 3 times daily PRN for diarrhea        losartan 25 MG tablet    COZAAR    60 tablet    Take 1 tablet (25 mg) by mouth every 12 hours    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, Hypertension goal BP (blood pressure) < 140/90       Menthol-Zinc Oxide 0.44-20.6 % Oint      1 application topically TID PRN for perianal irritation        Miconazole Nitrate 2 % Powd      as needed Apply in folds        mirtazapine 7.5 MG Tabs tablet    REMERON    90 tablet    Take 3 tablets (22.5 mg) by mouth At Bedtime    Mild single current episode of major depressive disorder (H), Anxiety       multivitamin, therapeutic with minerals Tabs tablet     30 each    Take 1 tablet by mouth daily    Cerebrovascular accident (CVA) due to other mechanism (H)       OLANZapine 2.5 MG tablet    zyPREXA    60 tablet    Take 1 tablet (2.5 mg) by mouth At Bedtime    Cerebrovascular accident (CVA) due to other mechanism (H), Anxiety, Delirium due to another medical condition       ondansetron 4 MG ODT tab    ZOFRAN-ODT    120 tablet    Take 1 tablet (4 mg) by mouth every 6 hours as needed for nausea or vomiting    Endocarditis and  heart valve disorders in diseases classified elsewhere       Potassium Chloride ER 20 MEQ Tbcr     60 tablet    Take 1 tablet (20 mEq) by mouth 2 times daily    Hypokalemia       senna-docusate 8.6-50 MG per tablet    SENOKOT-S;PERICOLACE    100 tablet    Take 1-2 tablets by mouth 2 times daily as needed for constipation    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

## 2018-02-19 NOTE — PROGRESS NOTES
"  SUBJECTIVE:   Cricket Miguel is a 62 year old male who presents to clinic today with caretaker for the following health issues:    2/19 Note --   Recent Hospitalization -- Reviewed documentation per Cardiology (Elissa Garzon) regarding recent Endocarditis and Redo sternotomy, femoral arterial and venous cannulation for cardiopulmonary bypass, aortic root and aortic valve replacement procedures at [facility]. After surgery, he was discharged to [facility].     At time of discharge from [facility] (Chart review 2/14), Cricket had finished his antibiotics and endocarditis was resolved. His GT was removed 1/19.     He stated that his right arm and leg strength and function are improving, and he is able to stand on his own at this point - undergoing PT.     Cricket stated that he doesn't taste much food yet, and his dentures aren't fitting properly, swallowing ok.     Cricket's caretaker expressed concerns about lethargy - his hgb was a bit low upon last CBC.     He is planning to follow up with ENT next week to help with congestion sx.     He has aides in place at home to help him, but things are still difficult -- looking for other options for a place to live, house has multiple levels. He is also wanting to be able to use his motor home again - would need to modify.     Caretaker expressed concerns about Cricket not taking potassium as directed -- he has been taking Bumex.     Potassium   Date Value Ref Range Status   02/19/2018 4.1 3.4 - 5.3 mmol/L Final     TCU Note 1/8/18 --   Hospital Course: \"Cricket Miugel is a 62 year old male with hx of COPD, HTN, HLD, CAD, heart failure, aortic stenosis and ascending aortic aneurysm s/p aortic valve replacement and aortic root replacement (April 2016) c/b moises-aortic abscess, endocarditis, cerebral septic emboli and severe AI s/p redo-sternotomy aortic root and valve homograft 12/19. Post op EF 20-25%.        PLAN: Neuro/ pain/ sedation: Hx of multifocal " acute infarctions involving the left parietotemporal lobes, left cerebral hemisphere and right occipital lobe presumed to be septic emboli. History of depression and anxiety. Had some delirium post operatively. Patient was followed by both psychiatry and neurology post operatively. His delirium has resolved and is mental status has significantly improved. He is alert and oriented x 3. His daughter Eloy is his medical decision maker as psychiatry does not believe he has the capacity at this time to be his own decision maker. His partner Meghna is very involed in his care and is his caregiver at home. He has been stable on his current regimen of zyprexa 2.5 mg bid, it was decreased 1/1/18 given increased flat affect and delayed responses. This has since resolved and he is doing better with both anxiety and affect. He is also on mirtazapine at night for sleep and PRN Lorazepam for anxiety. Neurology recommended fluoxetine for post-stroke recovery, however, Psych wanted to hold off on any changes to psych medications just prior to discharge. They also recommend against fluoxetine or Effexor at this time due to patient's long Qtc interval, 479 ms on 12/27. Patient did take Effexor prior to surgery for his depression. Should his depression become an issue psychiatry recommends starting Lexapro, as it has very few drug-drug interactions. Per neurology note on 12/29/17, they believe he should have continued functional improvement to some degree.     ED Note 12/16/17 --   Medical Decision Making: Cricket Miguel is a 62 year old male with a PMH of aortic stenosis with ascending aortic aneurysm/CAD s/p aortic valve replacement and aortic root replacement with composite graft (April 2016), HFpEF, COPD, HTN, HLD who presents for evaluation of shortness of breath.  I considered a broad differential of their dyspnea including CHF exacerbation, PE, dissection, hemothorax, pleural effusion, pneumonia, acute coronary syndrome,  "reactive airway disease, bronchitis, upper airway obstruction, foreign body, etc.  Given the history and exam plus laboratory findings I feel congestive heart failure is the most likely etiology and would not do extensive workup for other causes as mentioned above at this time.  Troponin was elevated as discussed above; again, cardiology feels this is likely to CHF and did not have any specific recommendations for this at time, and patient is a symptomatic without any chest pain, dyspnea, dizziness.  The patient received IV diuretics in ED and remained stable; will start I/O's here in ED.  Will transfer to the [facility] at this time for further cares.  Patient and his family are comfortable with plan.    12/15/17 OV Note --   Hx of CVA/Endocarditis -- Occurred 10/6/17 after abscess to heart, hospitalized ([facility]), transitional care followed on 11/4/17, sent home on 12/1/17 with reports of \"good recovery\" per hospital -- now undergoing PT/speech therapy and home care to help with speech, etc - this is going well. Discussed plans for surgery (Soumya at [facility]) in 1/18, with IV abx until that time. Followed by cardiology (Ryann).     See extensive DC summary from [facility] - reviewed in total with patient and significant other - Meghna     He has been able to swallow/chew with minimal issues, but \"nothing tastes good\" - will be seen by Dentistry for assistance with dentures. Feeding tube in place for medications. BM's have not been good - liquid and irregular - Miralax, lactobacillus, imodium A-D.      Leg strength improving bilaterally, left arm strength is \"okay\" but he continues to have issues with right arm strength and use - overall left side better than left.      Eye sx (issues closing eye) were assessed by Opthalmology and is being treated with drops, successfully - will follow up after surgeries.      Reviewed documentation - followed by Infections Disease (Saleem at [facility], Rock at [facility]).      COPD -- " "Giovanni complained of dyspnea issues when he lays down to bed - sleeping on an incline. Nebulizer available which helps - tracheotomy tube sometime around 11/25 while in hospital.      HTN -- Labetalol 100 mg every 8 hours, Amlodipine 5 mg daily, HCTZ 12.5 mg daily. Monitored by Homecare Nurse - good numbers.          BP Readings from Last 3 Encounters:   12/27/17 111/90   12/16/17 128/79   12/15/17 (!) 126/94      Anxiety/Depression/Agitation/Sleep -- Abilify 6 mg daily, Seroquel 25 mg 4 times daily via oral or feeding tube, Remeron 22.5 mg daily, Effexor 100 mg twice daily. Giovanni reported that he sleeps \"well\", but his SO stated that he often wakes up and is restless. Giovanni reported that he often gets frustrated.      Pain -- Roxicodone 5-10 mg prn every 4 hours, Tylenol 650 mg via feeding tube every 6 hours.       Lipids -- Lipitor 40 mg daily, Aspirin 81 mg daily.           Recent Labs   Lab Test  10/13/17   0357 04/03/16 02/07/12   1214   CHOL   --   105*  198   HDL   --   25  47   LDL   --   56  123   TRIG  265*  120  138   CHOLHDLRATIO   --    --   4.2          Problem list and histories reviewed & adjusted, as indicated.  Additional history: as documented    Reviewed and updated as needed this visit by clinical staff  Tobacco  Allergies  Meds  Med Hx  Surg Hx  Fam Hx  Soc Hx      Reviewed and updated as needed this visit by Provider       BP Readings from Last 3 Encounters:   02/19/18 120/76   02/13/18 (!) 136/98   01/31/18 106/74     Wt Readings from Last 4 Encounters:   02/19/18 179 lb (81.2 kg)   02/13/18 175 lb 14.4 oz (79.8 kg)   01/31/18 165 lb (74.8 kg)   01/23/18 172 lb 4.8 oz (78.2 kg)       Health Maintenance    Health Maintenance Due   Topic Date Due     HF ACTION PLAN Q3 YR  1955     WELLNESS VISIT Q1 YR  1955     COPD ACTION PLAN Q1 YR  1955     FIT Q1 YR  03/16/1963     HEPATITIS C SCREENING  03/16/1971     MICROALBUMIN Q1 YEAR  02/07/2013     DEPRESSION ACTION PLAN Q1 YR  " 05/06/2014     PHQ-9 Q6 MONTHS  12/03/2016     LIPID MONITORING Q1 YEAR  04/03/2017     PSA Q1 YR  11/30/2017       Current Problem List    Patient Active Problem List   Diagnosis     Tobacco use disorder     Hypertension goal BP (blood pressure) < 140/90     Disease of lung     Major depressive disorder, single episode, moderate (HCC)     Advance Care Planning     Psoriasis     COPD, mild (H)     CHF (congestive heart failure), NYHA class II (H)     AR (aortic regurgitation)     AI (aortic insufficiency)     Aortic root dilatation (H)     Hyperlipidemia LDL goal <70     Health Care Home     Status post coronary angiogram     Cardiomyopathy (H)     S/P AVR (aortic valve replacement)     H/O aortic root repair     Anemia     Endocarditis     Encephalopathy     Abscess of aortic root     CVA (cerebral vascular accident) (H)     Depression     Anxiety     Chronic systolic heart failure (H)     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       Past Medical History    Past Medical History:   Diagnosis Date     Abscess of aortic root 09/2017     AI (aortic insufficiency)     severe     Anxiety      Aortic root dilatation (H)      AR (aortic regurgitation)     severe     Cardiomyopathy (H)      CHF (congestive heart failure), NYHA class II (H)      COPD, mild (H)     spirometry 12/16     CVA (cerebral vascular accident) (H) 09/2017    septic emboli - MSSA - cerebellum, Left MCA, Rt Occipital, Aphasia, Left visual field cut, moderate to severe encephalopathy     Depression 09/2017     Heart murmur      Hyperlipidemia LDL goal <70 10/31/2010     Hypertension goal BP (blood pressure) < 140/90      Inguinal hernia      left lung nodule  1/29/2008     Major depressive disorder, single episode, moderate (H)      Psoriasis childhood     Tobacco use disorder        Past Surgical History    Past Surgical History:   Procedure Laterality Date     ARTHROSCOPY KNEE INCISION AND DRAINAGE Left 10/8/2017     Arthroscopic Incision and Drainage of Left Knee - Dr Munoz     HC SHOULDER ARTHROSCOPY, DX  2001    Arthroscopy, Shoulder RT Dr OSIRIS Andersen     HC SHOULDER ARTHROSCOPY, DX  1/04    LT arthroscopy Dr OSIRIS Andersen     HERNIA REPAIR, UMBILICAL  1/08    incarcerated - dr rockwell     HERNIORRHAPHY INGUINAL  12/21/2012    HERNIORRHAPHY INGUINAL;  Right Inguinal Hernia Repair with mesh ;  Surgeon: Mello Rockwell MD;  Location: [facility] OR     REPAIR ANEURYSM ASCENDING AORTA N/A 12/19/2017    REPAIR ANEURYSM ASCENDING AORTA;  REDO Median STERNOTOMY, FEMORAL CANNULATION, Lysis of adhesions, AORTIC ROOT VALVE HOMOGRAFT with 26mm x 7.0cm Cryolife Aortic valve and conduit - Dr Bowers     REPLACE VALVE AORTIC N/A 5/17/2016    REPLACE VALVE AORTIC - Dr Bowers     ROTATOR CUFF REPAIR RT/LT  11/10    Rt arthroscopy - Dr OSIRIS Andersen     SURGICAL HISTORY OF -   5/16    AVR, severe AR, aortic root repair     TRACHEOSTOMY PERCUTANEOUS  10/27/2017            Current Medications    Current Outpatient Prescriptions   Medication Sig Dispense Refill     mirtazapine (REMERON) 7.5 MG TABS tablet Take 3 tablets (22.5 mg) by mouth At Bedtime 90 tablet 3     Potassium Chloride ER 20 MEQ TBCR Take 1 tablet (20 mEq) by mouth 2 times daily 60 tablet 3     BUMETANIDE PO Take 1 mg by mouth daily       carvedilol (COREG) 12.5 MG tablet Take 1 tablet (12.5 mg) by mouth 2 times daily (with meals)       OLANZapine (ZYPREXA) 2.5 MG tablet Take 1 tablet (2.5 mg) by mouth At Bedtime 60 tablet 0     acetaminophen (TYLENOL) 32 mg/mL solution 20.3 mLs (650 mg) by Per NG tube route every 4 hours as needed for mild pain 400 mL 1     aspirin 81 MG chewable tablet 1 tablet (81 mg) by Oral or Feeding Tube route daily 60 tablet 1     ondansetron (ZOFRAN-ODT) 4 MG ODT tab Take 1 tablet (4 mg) by mouth every 6 hours as needed for nausea or vomiting 120 tablet 1     atorvastatin (LIPITOR) 40 MG tablet Take 1 tablet (40 mg) by mouth daily 60 tablet 1     losartan (COZAAR)  25 MG tablet Take 1 tablet (25 mg) by mouth every 12 hours 60 tablet 1     senna-docusate (SENOKOT-S;PERICOLACE) 8.6-50 MG per tablet Take 1-2 tablets by mouth 2 times daily as needed for constipation 100 tablet 0     multivitamin, therapeutic with minerals (THERA-VIT-M) TABS tablet Take 1 tablet by mouth daily 30 each 0     Carboxymethylcellulose Sod PF (REFRESH PLUS) 0.5 % SOLN ophthalmic solution Place 1 drop Into the left eye 3 times daily as needed (to flush debris from eye) 1 Bottle 1     famotidine (PEPCID) 20 MG tablet Take 1 tablet (20 mg) by mouth 2 times daily 60 tablet 1     loperamide (IMODIUM A-D) 2 MG tablet 2 tab by g tube 3 times daily PRN for diarrhea 30 tablet 0     lactobacillus TABS Take 1 tablet by mouth 3 times daily       Menthol-Zinc Oxide 0.44-20.6 % OINT 1 application topically TID PRN for perianal irritation       Miconazole Nitrate 2 % POWD as needed Apply in folds         Allergies    Allergies   Allergen Reactions     Lisinopril Swelling     One-sided facial swelling after being on lisinopril/HCTZ for one week.        Immunizations    Immunization History   Administered Date(s) Administered     Influenza Vaccine IM 3yrs+ 4 Valent IIV4 10/28/2017     Pneumococcal 23 valent 11/04/2017     TD (ADULT, 7+) 04/14/1999       Family History    Family History   Problem Relation Age of Onset     Genetic Disorder Mother      Bone plateover growth Agustin. shoulder surgeries     CANCER Mother      brain cancer     Alzheimer Disease Father        Social History    Social History     Social History     Marital status:      Spouse name: N/A     Number of children: 3     Years of education: 12     Occupational History     Not on file.     Social History Main Topics     Smoking status: Former Smoker     Packs/day: 1.00     Years: 35.00     Quit date: 4/1/2016     Smokeless tobacco: Never Used      Comment: 4/2016     Alcohol use 0.0 oz/week     0 Standard drinks or equivalent per week      Comment:  "once monthly     Drug use: Yes      Comment: marijuana- daily previously     Sexual activity: Yes     Partners: Female     Other Topics Concern     Caffeine Concern Yes     3 cups     Sleep Concern No     Special Diet No     trying to watch salt     Exercise Yes     walking     Seat Belt No     Parent/Sibling W/ Cabg, Mi Or Angioplasty Before 65f 55m? No     Social History Narrative       All above reviewed and updated, all stable unless otherwise noted    Recent labs reviewed    ROS:  Constitutional, HEENT, cardiovascular, pulmonary, GI, , musculoskeletal, neuro, skin, endocrine and psych systems are negative, except as in HPI or otherwise noted     This document serves as a record of the services and decisions personally performed and made by Dipak Gordillo MD FAAFP. It was created on their behalf by Jasper Jean, a trained medical scribe. The creation of this document is based the provider's statements to the medical scribe.  Jasper Jean February 19, 2018 12:22 PM      OBJECTIVE:                                                    /76  Pulse 68  Temp 97.5  F (36.4  C) (Oral)  Ht 5' 8\" (1.727 m)  Wt 179 lb (81.2 kg)  SpO2 96%  BMI 27.22 kg/m2  Body mass index is 27.22 kg/(m^2).  GENERAL: alert and no distress, pt in wheelchair, able to ambulate with assistance  HENT: ear canals and TM's normal upon viewing with otoscope, nose and mouth without ulcers or lesions upon viewing with otoscope  RESP: lungs clear to auscultation - no rales, no rhonchi, no wheezes  CV: regular rates and rhythm, normal S1 S2, no S3 or S4 and no murmur, no click or rub -  ABDOMEN: soft, no tenderness, no  hepatosplenomegaly, no masses, normal bowel sounds  MS: extremities- no gross deformities noted, no edema  SKIN: no suspicious lesions, no rashes to visible skin  NEURO: mentation and speech at baseline  BACK: no CVA tenderness, no paralumbar tenderness  PSYCH: affect normal    DIAGNOSTICS/PROCEDURES:                                 "                      No results found for this or any previous visit (from the past 24 hour(s)).     ASSESSMENT/PLAN:                                                        ICD-10-CM    1. 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 I39 Comprehensive metabolic panel     CBC with platelets   2. Cerebrovascular accident (CVA), unspecified mechanism (H) I63.9 Comprehensive metabolic panel     CBC with platelets   3. Abscess of aortic root I51.89 Comprehensive metabolic panel     CBC with platelets   4. Chronic systolic heart failure (H) I50.22 Comprehensive metabolic panel     CBC with platelets   5. S/P AVR (aortic valve replacement) Z95.2 Comprehensive metabolic panel     CBC with platelets   6. H/O aortic root repair Z98.890 Comprehensive metabolic panel     CBC with platelets   7. Weakness R53.1 Comprehensive metabolic panel     CBC with platelets     TSH     T4, free     Vitamin B12     Vitamin D Deficiency     Iron and iron binding capacity     Ferritin   8. Hypokalemia E87.6 Potassium Chloride ER 20 MEQ TBCR     Comprehensive metabolic panel   9. COPD, mild (H) J44.9    10. Mild single current episode of major depressive disorder (H) F32.0 mirtazapine (REMERON) 7.5 MG TABS tablet     TSH     T4, free   11. Anxiety F41.9 mirtazapine (REMERON) 7.5 MG TABS tablet     TSH     T4, free   12. Cardiomyopathy, unspecified type (H) I42.9 Comprehensive metabolic panel     CBC with platelets   13. Hypertension goal BP (blood pressure) < 140/90 I10 Comprehensive metabolic panel     CBC with platelets   14. Hyperlipidemia LDL goal <70 E78.5 Comprehensive metabolic panel     CBC with platelets   15. Medication monitoring encounter Z51.81 Comprehensive metabolic panel     CBC with platelets     TSH     T4, free     Vitamin B12     Vitamin D Deficiency     Iron and iron binding capacity     Ferritin     Discussed treatment/modality options, including risk and benefits, he desires follow up with  another visit, further health care maintenance, further lab(s), med changes (decrease Remeron to 22.5 mg daily), new meds (Potassium 20 meq), continued home care, DME as needed, medication refill(s), OTC meds, and observation. All diagnosis above reviewed and noted above, otherwise stable.  See [facility] orders for further details.  Follow up in 1 month(s) and as needed.    Addendum:   Patient could medically benefit from hospital bed due to the following - Patient has a medical condition which requires positioning of the body in ways not feasible with an ordinary bed.  Patient requires positioning of the body in ways not feasible with an ordinary bed in order to alleviate pain.  Patient requires the head of the bed to be elevated more than 30 degrees most of the time due to heart failure and problems with aspiration. Patient requires frequent changes in body position and has an immediate need for change in body position (at least 12 changes in body position per day) due to CVA, encephalopathy, COPD, Cardiomyopathy, weakness, CHF, aortic regurgitation.     Health Maintenance Due   Topic Date Due     HF ACTION PLAN Q3 YR  1955     WELLNESS VISIT Q1 YR  1955     COPD ACTION PLAN Q1 YR  1955     FIT Q1 YR  03/16/1963     HEPATITIS C SCREENING  03/16/1971     MICROALBUMIN Q1 YEAR  02/07/2013     DEPRESSION ACTION PLAN Q1 YR  05/06/2014     PHQ-9 Q6 MONTHS  12/03/2016     LIPID MONITORING Q1 YEAR  04/03/2017     PSA Q1 YR  11/30/2017     See Patient Instructions    The information in this document, created by the medical scribe for me, accurately reflects the services I personally performed and the decisions made by me. I have reviewed and approved this document for accuracy.   Dipak Gordillo MD FAAFP            Dipak Gordillo MD [address]  [id number] [phone] Fax

## 2018-02-19 NOTE — TELEPHONE ENCOUNTER
Next 5 appointments (look out 90 days)     Feb 19, 2018 11:20 AM CST   SHORT with Dipak Gordillo MD   [facility] ([facility])    [address] [place] [id number] [phone]            Apr 05, 2018  1:45 PM CDT   Return Visit with Alfredito Garzon MD   [facility] ([facility])    [address]  [facility] [id number] [phone]                  Noted     Jane Pizarro RN, BSN  [place] Triage

## 2018-02-21 NOTE — PROGRESS NOTES
[facility] now requests orders and shares plan of care/discharge summaries for some patients through [employer].  Please REPLY TO THIS MESSAGE in order to give authorization for orders when needed.  This is considered a verbal order, you will still receive a faxed copy of orders for signature.  Thank you for your assistance in improving collaboration for our patients.    ORDER PT for transfers, strengthening, balance, home safety, equipment needs, gait.     MD SUMMARY/PLAN OF CARE  Pt presents with h/o CVA with L sided weakness, impaired coordination, impaired balance, decreased safety with transfers and mobility.  At this time, pt is non ambulatory.  Pt will benefit from skilled PT to maximize strength and safety with mobility.  Recommend 2w4.      Goals to be met by 3/24/18  1.  Pt will be independent with A from spouse with HEP for strength in LE's to promote increased safety with mobility.   2.  Pt will be able to stand with UE support for greater than 1 minute to allow for A with dressing and transfers.  3.  Pt will be able to transfer bed to and from w/c with stand pivot and min A for decreased caregiver burden.   4.  Pt will be able to perform dynamic seated balance activities x 5 min to prevent falls when participating in dressing.        Chelsey Santiago, PT  [facility]  [phone]

## 2018-02-26 NOTE — TELEPHONE ENCOUNTER
Reason for Call:  Other Letter    Detailed comments: Pt daughter called in asking that a letter be sent to her regarding Cricket and his health history since October. It needs to have summarized events with dates. Also must state he can not return to work. 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 He was not able to file a tax return and some charges were added.  Please call his daughter with any questions Elma [phone].    Please mail to :    Elma Chilel  [address] [place] [address]    Phone Number Patient can be reached at: Other phone number:  [phone]    Best Time:     Can we leave a detailed message on this number? Not Applicable    Call taken on 2/26/2018 at 1:12 PM by Florence Pena

## 2018-02-26 NOTE — PROGRESS NOTES
Clinic Care Coordination Contact  Care Team Conversations    Received notification that pt's significant other would like information on stroke groups.  Discussed with clinic SW, she provided writer with resources.  Placed call to so, left vm requesting call back to writer.  Will attempt to reach her again in the next 1-2 business days if no call back.

## 2018-02-26 NOTE — TELEPHONE ENCOUNTER
Routing to  to review and write letter.    Merary Kiran, BS, RN, N  [facility]) [phone]

## 2018-02-26 NOTE — LETTER
[facility] - [address] [place] [address]                                                                                                       [phone]    February 27, 2018    Cricket Miguel  [address]   [facility] [address]      To Whom it May Concern:    The above patient has had multiple hospitalizations since October 2017 through January 2018 . Cricket has also been in transitional care facilities while he was not in the hospital during this time frame. Due patients ongoing health concerns patient has been unable to work and it is uncertain when Cricket will be able to return to work at this time.   Please see the attached discharge summaries from the hospital as with his last office visit.  Please contact me with questions or concerns.      Sincerely,        Dipak Gordillo M.D./KYRA SOLER

## 2018-02-26 NOTE — LETTER
[facility] - [address] [place] [address]                                                                                                       [phone]    February 27, 2018    Cricket Miguel  [address]  [place] [address]      To Whom it May Concern:    The above patient has had multiple hospitalizations since October 2017 through January 2018 . Cricket has also been in transitional care facilities while he was not in the hospital during this time frame he was discharge from TCU Februry 2018. Cricket is currently doing in home therapies. Due patients ongoing health concerns patient has been unable to work and it is uncertain when Cricket will be able to return to work at this time.   Please see the attached discharge summaries from the hospital as with his last office visit.  Please contact me with questions or concerns.    Sincerely,        Dipak Gordillo M.D./ [facility] SOL[facility]

## 2018-02-27 NOTE — TELEPHONE ENCOUNTER
Huddled with Md PHI - please print all discharge summaries from hospital admissions since Oct, then write the information below from Md Phi.     Printed letter and mailed to address below    Called # below and left a non detailed VM     Jane Pizarro RN, BSN  [place]

## 2018-02-27 NOTE — TELEPHONE ENCOUNTER
Pts daughter calling     Advised on the information below     Patient's daughter stated an understanding and agreed with plan.    Jane Pizarro RN, BSN  [facility]

## 2018-02-27 NOTE — PROGRESS NOTES
Clinic Care Coordination Contact  Care Team Conversations    Received call back from pt's jose alfredo Meghna, discussed calling the [place] stroke Association to find out what groups they have available.  Pt stated that she has been on their website and has been calling groups, but was hoping that CC would know of exactly the group type she was looking for.  She reports that it is taking her some time as she has to call each  individually to find out the set up.  She declined need for any other assist at this time, will continue to discuss with groups.  She will contact writer if any other needs arise.

## 2018-02-27 NOTE — TELEPHONE ENCOUNTER
Please write letter stating:    Recent significant medical illness.  Significant residual illness.  Uncertain when and if he will be able to return to work.    Please attach all DC summaries since fall 2017

## 2018-02-28 NOTE — PROGRESS NOTES
Reason for Call: Request for an order or referral:    Order or referral being requested: Hospital Bed    Date needed: Stephanie    Has the patient been seen by the PCP for this problem? yes    Additional comments: Please call [facility] and talk to     Phone number nurse can be reached at: [phone]    Best Time: any    Can we leave a detailed message on this number?  YES    Call taken on 2/28/2018 at 9:02 AM by Jena Garcia

## 2018-02-28 NOTE — PROGRESS NOTES
Spoke with ronnie CAIN     Asking for verbal orders for OT for 10 visits in march and 2 visits in April     Gave verbal okay   Asking for hospital bed - please fax to [facility] in [place]       Placed order for hospital bed and faxed order to [phone]     Jane Pizarro RN, BSN  [place] Triage

## 2018-03-02 NOTE — TELEPHONE ENCOUNTER
Reason for Disposition    All other urine symptoms    Additional Information    Negative: Shock suspected (e.g., cold/pale/clammy skin, too weak to stand, low BP, rapid pulse)    Negative: Sounds like a life-threatening emergency to the triager    Negative: Followed a genital area injury    Negative: Followed a genital area injury (penis, scrotum)    Negative: Vaginal discharge    Negative: Pus (white, yellow) or bloody discharge from end of penis    Negative: [1] Taking antibiotic for urinary tract infection (UTI) AND [2] female    Negative: [1] Taking antibiotic for urinary tract infection (UTI) AND [2] male    Negative: [1] Discomfort (pain, burning or stinging) when passing urine AND [2] pregnant    Negative: [1] Discomfort (pain, burning or stinging) when passing urine AND [2] postpartum < 1 month    Negative: [1] Discomfort (pain, burning or stinging) when passing urine AND [2] female    Negative: [1] Discomfort (pain, burning or stinging) when passing urine AND [2] male    Negative: Pain or itching in the vulvar area    Negative: Pain in scrotum is main symptom    Negative: Blood in the urine is main symptom    Negative: Symptoms arising from use of a urinary catheter (Espinoza or Coude)    Negative: [1] Unable to urinate (or only a few drops) > 4 hours AND     [2] bladder feels very full (e.g., palpable bladder or strong urge to urinate)    Negative: [1] Decreased urination and [2] drinking very little AND [2] dehydration suspected (e.g., dark urine, no urine > 12 hours, very dry mouth, very lightheaded)    Negative: Patient sounds very sick or weak to the triager    Negative: Fever > 100.5 F (38.1 C)    Negative: Side (flank) or lower back pain present    Negative: [1] Can't control passage of urine (i.e., urinary incontinence) AND [2] new onset (< 2 weeks) or worsening    Negative: Urinating more frequently than usual (i.e., frequency)    Negative: Bad or foul-smelling urine    Negative: Has to get out of  bed to urinate > 2 times a night (i.e., nocturia)    Negative: Dribbling (losing urine) just after finishing urination (i.e., post-void dribbling)    Negative: Urination is difficult to start (i.e., hesitancy) or straining    Negative: [1] Can't control passage of urine (i.e., urinary incontinence, wetting self) AND [2] present > 2 weeks    Protocols used: URINARY SYMPTOMS-ADULT-AH

## 2018-03-02 NOTE — TELEPHONE ENCOUNTER
Spouse Meghna is calling and states that Cricket is taking iron pills and no bowel movement all week. Meghna stopped the iron due to constipation.   Today urine output is not much.    Cricket is urinating one time every eight hours but output is not as much.  Meghna feels he should have more urine with diuretic.    Denies blood in urine, no pain while urinating and no fever.  FNA advised he may be retaining urine.    Meghna is requesting to speak with MD Gordillo.  Please phone Meghna at [phone].

## 2018-03-02 NOTE — TELEPHONE ENCOUNTER
Clinic Action Needed:  Yes, callback  FNA Triage Call  Presenting Problem:     Spouse Meghna is calling and states that Cricket is taking iron pills and no bowel movement all week. Meghna stopped the iron due to constipation.   Today urine output is not much.    Cricket is urinating one time every eight hours but output is not as much.  Meghna feels he should have more urine with diuretic.    Denies blood in urine, no pain while urinating and no fever.  FNA advised he may be retaining urine.    Meghna is requesting to speak with MD Gordillo.  Please phone Meghna at [phone].      Guideline Used:  Urinary Symptoms  Patient Recommendations/Teaching:  Schedule an appointment  Routed to:  RN Pool  Please be sure to close this encounter once this patient's issue/question has been addressed.    Meghna Baum RN/[facility]

## 2018-03-02 NOTE — TELEPHONE ENCOUNTER
What diuretic is pt taking and what dose? Bumex 1mg daily, not urinating but is urinating about Q8H.    Fluids- pt is drinking through the day- OJ and coke , milk and water    BM- pt took iron for only two days and stopped these on Wednesday of this week.  Pt denies abdominal pain. Appetite decreased.  Pt takes one packet 17g of fiber supplement.  Pt took a Bisacodyl laxative 5mg last night.     Pt is eating shredded wheat for breakfast, banana and OJ.    Advised to continue more fluids and warm fluids this am. Will huddle with provider today.  Florence Castro RN  [facility]

## 2018-03-02 NOTE — TELEPHONE ENCOUNTER
Meghna, patient's significant other, calls because she is concerned about decreased urine output and constipation. She has also called PMD and is waiting for a call back. She reports Cricket is drinking and eating, less than prior to before as he sleeps a lot. He was started on iron by PMD but she stopped it after 3 days without a BM. Today is the 5th day without BM. She says he has no new edema, his breathing is fine. She just wanted Dr. Garzon to be aware. I advised her to follow through with his PMD for these problems, since his heart status does not appear to be affected.

## 2018-03-02 NOTE — TELEPHONE ENCOUNTER
Per huddle with TS-    Advised Senokot type laxative BID  1 iron tablet daily  17g of laxative/fiber supplement each day in OJ    Call with any further concerns or continued sx.    The patient indicates understanding of these issues and agrees with the plan.  Florence Castro RN  [facility]

## 2018-03-05 NOTE — TELEPHONE ENCOUNTER
Consent to communicate on file for Meghna  Called # below    Meghna stated that speech therapy recommended patient undergo Outpatient Therapy for NMES (Neuromuscular Electrical Stimulation) - stated that speech therapy doesn't do this but if we fax them the order they will find someone who does  Requesting order be faxed to Attn: Carole Fiore, Speech Therapist @ [phone]    Message handled by Nurse Triage with Huddle - provider name: OK for order.  Order placed and faxed to # above    Joycelyn Jones RN  [facility]

## 2018-03-05 NOTE — TELEPHONE ENCOUNTER
Name of caller: Meghna  Relationship to Patient: caregiver/sig other    Reason for Call:  Would like to discuss putting in orders for NMES, neurological muscular stimulation - recommended per speech therapy    Best phone number to reach pt at is: [phone]  Ok to leave a message with medical info? yes    Marge Heath

## 2018-03-07 NOTE — TELEPHONE ENCOUNTER
Pts wife called stating that patient had xray done on his mouth and they found a broken tooth in his gum. They would like patient to get this removed as he is at risk for infection with it (currently does not look infected). Pts wife wondering what Dr. Garzon would recommend as patient had an AVR 12/19/17 and is aware he shouldn't have dental work for at least 6 months but they also do not want to risk patient getting an infection in his mouth.     Pt scheduled for Echo 4/2/18 and OV with Dr. Garzon 4/5/18     Will route to Dr. Garzon to review and advise.

## 2018-03-07 NOTE — TELEPHONE ENCOUNTER
It's a tough balance. If they think it's high risk for infection in the next two months, then okay to remove it (with antibiotics). If it can wait, then we should wait.

## 2018-03-07 NOTE — TELEPHONE ENCOUNTER
Melanie from OT called requesting OP (not responsive to home care orders) orders for OT, PT and speech therapy at [facility] in [place].  Requesting [facility] orders for these (referrals)    [phone] Melanie    Please advise for referrals  Jonna Bennett RN  Triage[facility]

## 2018-03-07 NOTE — TELEPHONE ENCOUNTER
Spoke to patients wife and informed her of Dr. Garzon's response below. Pts wife is going to speak with the dentist again and see how much of a risk the patient is for infection. Pts wife stated that they hopefully can wait until after the 6 month margie. They will call team 4 with any further questions or concerns.

## 2018-03-09 NOTE — TELEPHONE ENCOUNTER
"Last Written Prescription Date:  1/4/18  Last Fill Quantity: 60,  # refills: 1   Last office visit: 2/19/2018    Future Office Visit:   Next 5 appointments (look out 90 days)     Apr 05, 2018  1:45 PM CDT   Return Visit with Alfredito Garzon MD   [facility]    [address]  [facility] [facility] [address] [phone]                   Requested Prescriptions   Pending Prescriptions Disp Refills     losartan (COZAAR) 50 MG tablet [Pharmacy Med Name: LOSARTAN 50MG TABLETS] 30 tablet 0     Sig: TAKE 1/2 TABLET BY MOUTH EVERY 12 HOURS    Angiotensin-II Receptors Passed    3/9/2018  2:06 PM       Passed - Blood pressure under 140/90 in past 12 months    BP Readings from Last 3 Encounters:   02/19/18 120/76   02/13/18 (!) 136/98   01/31/18 106/74                Passed - Recent (12 mo) or future (30 days) visit within the authorizing provider's specialty    Patient had office visit in the last 12 months or has a visit in the next 30 days with authorizing provider or within the authorizing provider's specialty.  See \"Patient Info\" tab in inbasket, or \"Choose Columns\" in Meds & Orders section of the refill encounter.           Passed - Patient is age 18 or older       Passed - Normal serum creatinine on file in past 12 months    Recent Labs   Lab Test  02/19/18   1227   CR  1.17            Passed - Normal serum potassium on file in past 12 months    Recent Labs   Lab Test  02/19/18   1227   POTASSIUM  4.1                      Routing refill request to provider for review/approval because:  Drug interaction warning    losartan (COZAAR) 50 MG tablet [Pharmacy Med Name: LOSARTAN 50MG TABLETS]  Allergy/Contraindication:LisinoprilReactions:Swelling    Jonna Benentt RN  Triage[facility] workforce          "

## 2018-03-09 NOTE — TELEPHONE ENCOUNTER
Huddled with MD RADHA.  Note from 2/19 addended to included necessary information.  Faxed paperwork to [phone]    Marge Heath

## 2018-03-09 NOTE — TELEPHONE ENCOUNTER
rx done    BP Readings from Last 3 Encounters:   02/19/18 120/76   02/13/18 (!) 136/98   01/31/18 106/74     Creatinine   Date Value Ref Range Status   02/19/2018 1.17 0.66 - 1.25 mg/dL Final   ]

## 2018-03-09 NOTE — TELEPHONE ENCOUNTER
Meghna (pt's spouse) called to see of a form was received from [facility] for a hospital bed. [facility] faxed it approximately 3:30 Thursday afternoon.  She is requesting to have the form completed and faxed back ASAP as they  would like the bed today. Any questions, patient and Meghna can be reached at [phone]. Thank you.  Jo Garay,

## 2018-03-09 NOTE — TELEPHONE ENCOUNTER
Pt with hx of thrush in October.  Caregiver calls and states that he has had sores in his mouth x 3 days.  Pt states that they look like canker sores and they are far up on the gumline, has two of them.  No signs of thrush.  Denies fevers.  Pt advised to try OTC oral pain medications like orajel, advised to try Canker v=covers.  Agrees with plan and if these don't help WCB to schedule appt next week    Jonna Bennett RN  Triage[facility]

## 2018-03-10 PROBLEM — Z98.890 HX OF AORTIC ROOT REPAIR: Status: ACTIVE | Noted: 2018-01-01

## 2018-03-10 NOTE — ED NOTES
[facility]  ED Nurse Handoff Report    Cricket Miguel is a 62 year old male   ED Chief complaint: Hypotension and Chest Pain  . ED Diagnosis:   Final diagnoses:   Acute chest pain   Left wrist pain   Leukocytosis, unspecified type   Mouth sore   Hypotension, unspecified hypotension type     Allergies:   Allergies   Allergen Reactions     Lisinopril Swelling     One-sided facial swelling after being on lisinopril/HCTZ for one week.        Code Status: Full Code  Activity level - Baseline/Home:  Stand with Assist of 2. Activity Level - Current:   Stand with Assist of 2. Lift room needed: No. Bariatric: No   Needed: No   Isolation: No. Infection: Not Applicable.     Vital Signs:   Vitals:    03/10/18 1315 03/10/18 1330 03/10/18 1345 03/10/18 1356   BP: 107/86 105/86 104/82    Resp:       Temp:       TempSrc:       SpO2: 97% 97%  98%       Cardiac Rhythm:  ,      Pain level: 0-10 Pain Scale: 8  Patient confused: No. Patient Falls Risk: Yes.   Elimination Status: Has voided   Patient Report - Initial Complaint: chest pain left wrist pain. Focused Assessment: left wrist arm pain , mouth sores , chest pain , hyptoension  Tests Performed: labs, CT, xray, US. Abnormal Results: see results in epic, wbc 12.4, d dimer 1.7, co2 59, bicarb 32  Treatments provided: magic mouthwash, NS 1000ml   Family Comments: family bedside  OBS brochure/video discussed/provided to patient:  N/A  ED Medications:   Medications   lidocaine 1 % 1 mL (not administered)   lidocaine (LMX4) kit (not administered)   sodium chloride (PF) 0.9% PF flush 3 mL (not administered)   sodium chloride (PF) 0.9% PF flush 3 mL (not administered)   0.9% sodium chloride BOLUS (0 mLs Intravenous Stopped 3/10/18 1357)     Followed by   sodium chloride 0.9% infusion (not administered)   0.9% sodium chloride BOLUS (not administered)   iopamidol (ISOVUE-370) solution 500 mL (not administered)   magic mouthwash suspension (diphenhydramine,  lidocaine, aluminum-magnesium & simethicone) (not administered)     Drips infusing:  No  For the majority of the shift, the patient's behavior Green. Interventions performed were na.     Severe Sepsis OR Septic Shock Diagnosis Present: No      ED Nurse Name/Phone Number: Candice Robert,   2:02 PM  RECEIVING UNIT ED HANDOFF REVIEW    Above ED Nurse Handoff Report was reviewed: Yes  Reviewed by: Kenzie Hull on March 10, 2018 at 3:58 PM

## 2018-03-10 NOTE — IP AVS SNAPSHOT
[address] [address]    Phone:  [phone]                                       After Visit Summary   3/10/2018    Cricket Miguel    MRN: [id number]           After Visit Summary Signature Page     I have received my discharge instructions, and my questions have been answered. I have discussed any challenges I see with this plan with the nurse or doctor.    ..........................................................................................................................................  Patient/Patient Representative Signature      ..........................................................................................................................................  Patient Representative Print Name and Relationship to Patient    ..................................................               ................................................  Date                                            Time    ..........................................................................................................................................  Reviewed by Signature/Title    ...................................................              ..............................................  Date                                                            Time

## 2018-03-10 NOTE — ED PROVIDER NOTES
ED Sign Out Note     Patient signed out to me by Dr. Leon at 2:30 pm with plan to admit to IM, and for me to check CT chest results.     Imaging:    Chest CT, IV contrast only- PE protocol:  1. No evidence for pulmonary embolism.  2. Collection of fluid or old hemorrhage around the ascending thoracic  aorta. This does not communicate with the ascending aorta or any other  vascular structure as seen on the previous study from 12/17/2017 and  may represent an old hematoma. An infected collection is not excluded.  3. Old granulomatous disease in lungs bilaterally.     Interventions:  ANCEF 2 g IV       1526 I spoke with Dr. Rosas  regarding this patient.    1541 I spoke with Dr. Nguyen with Radiology regarding this patient.    1645 I reassessed the patient.     1716 Dr. Rosas is talking to the [facility] hospitalist.    1721 I spoke with Dr. Rosas regarding this patient. Dr. Rosas spoke with Dr. Moore at the [facility].    2115 EMS transport to [facility] canceled. There was discussion between admitting services as to who would be primary.    Findings and plan explained to the Patient.  Originally the plan had been for patient to be admitted to hospitalist service here.  However, given the CT findings showing a fluid collection versus possible old hemorrhage around the ascending thoracic aorta, there is concern that he has underlying infection in his chest.  Patient was started on Ancef after third blood culture was sent. He will likely need thoracic surgery consultation, which cannot be done at [facility].  As he has had his previous care [facility], he will be transferred there. Dr. Rosas is very familiar with the patient and discussed the care with the admitting hospitalist, Dr. Moore.  He has been hemodynamically stable without further episodes of hypotension.  Patient was ultimately transferred to [facility] via EMS, however this was delayed due to changes in bed placement and  discussion about who would be the primary accepting service.  Family and the patient were updated along the way about the above-stated plan, and all questions were answered.         Carly Yee MD  03/10/18 6066

## 2018-03-10 NOTE — PHARMACY-ADMISSION MEDICATION HISTORY
Admission medication history interview status for this patient is complete. See [facility] admission navigator for allergy information, prior to admission medications and immunization status.     Medication history interview source(s):Family  Medication history resources (including written lists, pill bottles, clinic record):List  Primary pharmacy:[facility]    Changes made to PTA medication list:  Added: Iron 325mg, Bisacodyl, Miralax  Deleted: Tylenol, Lactobacillus, Loperamide, Menthol-Zinc oxide Oint., Miconazole powder, Ondansetron  Changed: none    Actions taken by pharmacist (provider contacted, etc):None     Additional medication history information:None    Medication reconciliation/reorder completed by provider prior to medication history? No    Do you take OTC medications (eg tylenol, ibuprofen, fish oil, eye/ear drops, etc)? Yes    For patients on insulin therapy: No    Prior to Admission medications    Medication Sig Last Dose Taking? Auth Provider   bumetanide (BUMEX) 1 MG tablet Take 1 mg by mouth daily 3/10/2018 at am Yes Unknown, Entered By History   Ferrous Sulfate (IRON) 325 (65 FE) MG tablet Take 1 tablet by mouth every evening 3/9/2018 at pm Yes Unknown, Entered By History   bisacodyl (DULCOLAX) 5 MG EC tablet Take 5 mg by mouth 2 times daily as needed for constipation 3/8/2018 Yes Unknown, Entered By History   polyethylene glycol (MIRALAX/GLYCOLAX) powder Take 17 g by mouth every evening as needed for constipation 3/8/2018 Yes Unknown, Entered By History   losartan (COZAAR) 50 MG tablet TAKE 1/2 TABLET BY MOUTH EVERY 12 HOURS 3/10/2018 at am Yes Dipak Gordillo MD   mirtazapine (REMERON) 7.5 MG TABS tablet Take 3 tablets (22.5 mg) by mouth At Bedtime 3/9/2018 at hs Yes Dipak Gordillo MD   Potassium Chloride ER 20 MEQ TBCR Take 1 tablet (20 mEq) by mouth 2 times daily 3/10/2018 at am Yes Dipak Gordillo MD   carvedilol (COREG) 12.5 MG tablet Take 1 tablet (12.5 mg) by mouth 2 times daily (with meals)  3/10/2018 at am Yes Candice Olivera APRN CNP   OLANZapine (ZYPREXA) 2.5 MG tablet Take 1 tablet (2.5 mg) by mouth At Bedtime 3/9/2018 at hs Yes Alfredito Garzon MD   aspirin 81 MG chewable tablet 1 tablet (81 mg) by Oral or Feeding Tube route daily 3/10/2018 at 5 tablets this morning Yes Florence Juan PA-C   atorvastatin (LIPITOR) 40 MG tablet Take 1 tablet (40 mg) by mouth daily 3/9/2018 at pm Yes Florence Juan PA-C   senna-docusate (SENOKOT-S;PERICOLACE) 8.6-50 MG per tablet Take 1-2 tablets by mouth 2 times daily as needed for constipation 3/10/2018 at am Yes Florence Juan PA-C   multivitamin, therapeutic with minerals (THERA-VIT-M) TABS tablet Take 1 tablet by mouth daily 3/10/2018 at am Yes Florence Juan PA-C   famotidine (PEPCID) 20 MG tablet Take 1 tablet (20 mg) by mouth 2 times daily 3/10/2018 at am Yes Florence Juan PA-C   order for DME Equipment being ordered: Hospital Bed   Dipak Gordillo MD   Carboxymethylcellulose Sod PF (REFRESH PLUS) 0.5 % SOLN ophthalmic solution Place 1 drop Into the left eye 3 times daily as needed (to flush debris from eye) 3/8/2018  Florence Juan PA-C

## 2018-03-10 NOTE — Clinical Note
Admitting Physician: JAZZ VU [[id number]]   [facility] [[id number]]   Bed Type: Cardiac Telemetry [44]   Special needs: Fall Risk [8]   Bed request comments: TELE ADMIT- BED REUQCAMDEN AT 0683

## 2018-03-10 NOTE — ED PROVIDER NOTES
History     Chief Complaint:  Chest Pain    HPI     Cricket Miguel is a 62 year old male who presents to the emergency department today with his son via EMS for evaluation of chest pain. The patient states that around 0600 this morning he woke up, and he proceeded to start his day when he began experiencing chest pain. He notes the chest pain seems to have diminished. He also notes left arm pain seeming to localize over his left wrist. The patient denies any trauma. He is a poor historian in recollection of details.     The patient's girlfriend Meghna reported to the ED, and she indicates that for the last couple of days the patient has been experiencing increased body stiffness and he has developed new sores in his mouth, and he has been complaining of a sore tongue and jaw. She denies the patient any new changes in medications The patient's girlfriend and his son indicate that they think he has been more confused in the last couple of days and they are concerned about this in the setting of previous severe infections. They note that he experienced confusion and a nonspecific unwell feeling when he was septic in the past. They deny the patient any new changes in appetite,bladder habits, or rashes. They note that he has had fewer BM's than normal. No new difficulty swallowing. No trauma. No fevers or chills.     Allergies:  Lisinopril- swelling     Medications:    Asprin  Atorvastatin  Bumetanide  Carvedilol  Famotidine  Losartan  Mirtazapine  Olanzapine  Multi-Vit main  Potassium  Iron Ferrous  Polyethylene Glycol  Bisacodyl  Senna     Past Medical History:    Abscess of aortic root /Endocarditis  Aortic Insufficiency  Anxiety   Aortic root dilatation (H)   AR (aortic regurgitation)   Cardiomyopathy (H)   CHF (congestive heart failure), NYHA class II (H)   COPD, mild (H)   CVA (cerebral vascular accident) (H)   Depression   Heart murmur   Hyperlipidemia LDL goal <70   Hypertension goal BP (blood pressure) <  140/90   Inguinal hernia   left lung nodule    Major depressive disorder, single episode, moderate (H)   Psoriasis   Sensorineural hearing loss)   Tobacco use disorder     Past Surgical History:    Arthroscopy Knee Incision and Drainage  Shoulder Arthroscopy  Hernia Repair  Herniorrhaphy Inguinal  Repair Aneurysm Ascending Aorta  Replace Valve Aortic  Rotator Cuff Repair, RT/LT  Aortic Root Repair  Tracheostomy Percutaneous    Family History:    Mother positive for Genetic Disorder and Brain Cancer  Father positive for Alzheimer Disease    Social History:  The patient was accompanied to the ED by his son and girlfriend.  Smoking Status: former ppd smoker for 35 years, quit 4/1/2016  Smokeless Tobacco: negative  Alcohol Use: negative  Marital Status:   [5]     Review of Systems   Constitutional: Positive for activity change. Negative for chills and fever.   HENT: Positive for mouth sores.         Positive for left jaw pain   Respiratory: Positive for cough. Negative for shortness of breath.    Cardiovascular: Positive for chest pain. Negative for leg swelling.   Gastrointestinal: Negative for anal bleeding.   Genitourinary: Negative for difficulty urinating.   Musculoskeletal: Positive for arthralgias. Negative for neck stiffness.   Skin: Negative.    Neurological: Positive for weakness. Negative for headaches.   Psychiatric/Behavioral: Positive for confusion.   All other systems reviewed and are negative.      Physical Exam     Patient Vitals for the past 24 hrs:   BP Temp Temp src Heart Rate Resp SpO2   03/10/18 1500 103/89 - - 70 - -   03/10/18 1451 - - - 70 - 96 %   03/10/18 1448 (!) 136/99 - - - - 97 %   03/10/18 1415 102/76 - - 68 - 93 %   03/10/18 1400 100/79 - - 69 - 92 %   03/10/18 1356 - - - 73 - 98 %   03/10/18 1345 104/82 - - 67 - -   03/10/18 1330 105/86 - - - - 97 %   03/10/18 1315 107/86 - - - - 97 %   03/10/18 1300 104/74 - - - - -   03/10/18 1245 101/87 - - - - -   03/10/18 1230 98/83 - - - -  -   03/10/18 1100 (!) 85/71 - - 66 - 93 %   03/10/18 1045 90/62 - - 65 - 96 %   03/10/18 1030 - - - 68 - 95 %   03/10/18 1015 98/77 - - 70 - -   03/10/18 1010 - 99.6  F (37.6  C) Rectal - - -   03/10/18 1005 - - - 70 - 98 %   03/10/18 0945 90/76 - - 71 - 95 %   03/10/18 0942 - 97.9  F (36.6  C) Oral - - -   03/10/18 0941 104/76 - - 69 18 97 %       Physical Exam  General: Adult male sitting upright.  Eyes: PERRL, Conjunctive within normal limits  ENT: Edentulous. There is a slight area of erythema near the frenulum. Dry tongue. No other visible lesions or sores.   Neck: no rigidity. No lymph adenopathy  CV: Normal S1S2, no murmur, rub or gallop. Regular rate and rhythm. Distant heart sounds  Resp: Clear to auscultation bilaterally, no wheezes, rales or rhonchi. Normal respiratory effort.  GI: Abdomen is soft, nontender and nondistended. No palpable masses. No rebound or guarding.  MSK: Tender with mild verbalized edema over the left wrist and dorsum of the left hand without palpable crepitus or deformity. Pain increases with passive range of motion. Non tender to the remainder of the extremities.   Skin: Warm and dry. No rashes or lesions or ecchymoses on visible skin.  Neuro: Alert and oriented. Responds appropriately to all questions and commands. No focal findings appreciated. Normal muscle tone. Left sided lower facial paralysis.   Psych: Normal mood and affect. Pleasant.    Emergency Department Course   ECG:  Indication: Chest Pain  Completed at 0949.  Read at 0944.   Rate 72 bpm. GA interval 182 ms. QRS duration 88 ms. QT/QTc 414/453 ms. P-R-T axes 26 -41 40.  Normal sinus rhythm. Left axis deviation. Abnormal ECG    Imaging:  Radiographic findings were communicated with the patient and family who voiced understanding of the findings.    XR Chest 2 Views:  Interval removal of the previously seen right-sided PICC  line. Prior median sternotomy again noted. Few scattered calcified  benign granulomata  bilaterally. No active cardiopulmonary disease. As per radiology.     Wrist XR, G/E 3 views, left:  No evidence of fracture. Osteopenia. Calcifications in the  triangular fibrocartilage. As per radiology.     Arm, left, venous US:  No evidence for DVT. As per radiology.     Chest CT, IV contrast only- PE protocol:  Pending    Laboratory:  CBC: WBC 12.4 (H), RBC 4.03 (L), HGB 12.3 (L), HCT 37.8 (L), Absolute Neutrophil 9.9 (H) o/w WNL. ()   CMP: Glucose 124 (H), Albumin 3.0 (L), GFR Estimate 60 (L) o/w WNL. (Creatinine: 1.21)  Lactic Acid whole blood: 1.5  Blood gas venous: PCO2 59 (H), Bicarbonate 32 (L), otherwise normal  1122 Troponin I: <0.015  1057 D Dimer: 1.7 (H)  Influenza A/B antigen: Negative   Blood Cultures: Pending   UA with Microscopic: 2 Hyaline Casts (H), otherwise normal   Urine Culture: Pending     Interventions:  1044 Normal Saline 1,000 mL IV    Emergency Department Course:  Nursing notes and vitals reviewed. I performed an exam of the patient as documented above.    Blood drawn. This was sent to the lab for further testing, results above.    The patient was swabbed for influenza, results above.     The patient provided a urine sample here in the emergency department. This was sent for laboratory testing, findings above.     The patient was sent for the following imaging studies while in the emergency department: XR Chest 2 Views, Wrist XR, G/E 3 views, left, Arm, left, venous US.    1217 I reevaluated the patient and provided an update in regards to his ED course.      The patient was sent for the following imaging studies while in the emergency department: Chest CT, IV contrast only- PE protocol.    The patient's wrist was placed in a splint.     Discussed patient with Dr. Lr who has accepted his care for admission, pending CT scan results.    Patient signed out to Dr. Yee for follow-up of CT results.    Impression & Plan    Medical Decision Making:  Cricket Miguel is a 62 year old  male with a history of recurrent strokes after sepsis who presents to the emergency department with concerns today for mouth sores, chest pain, and left arm pain. He was denying any fever but did have a cough. Influenza is negative, and ultimately his symptoms seem less consistent with pneumonia. For the chest pain, multiple etiologies were considered including acute coronary syndrome,  pulmonary embolism, pneumothorax, pleural effusion, etc. Initial chest XR was unrevealing, but given elevated D Dimer CT scan was ordered and is pending at this time of this dictation. Prior to evaluation, per EMS, he was hypotensive. That resolved after small amounts of normal saline that they reportedly administered. Here he was normotensive with a single brief episode of hypotension to the high 80's of unclear clinical significance in this setting.He is afebrile but with leukocytosis. There is no evidence of elevated lactic acid level. No clear evidence to suggest sepsis. Leukocytosis is non-specific. He is also noting some pain in his left wrist which has localized edema. He does have some calcifications there noted on XR but no fracture. There is no overlying redness or increased heat to touch, and septic arthritis seems unlikely. He was placed in a wrist splint for comfort. The etiology of the chest pain is not entirely clear, although PE, recurrent aortic valve abscess or endocarditis, pneumonia, etc, considered. He will be admitted. Again CT scan is pending, and will be followed by my colleague here in the ED, but either way he will be admitted for further care.       Diagnosis:    ICD-10-CM    1. Acute chest pain R07.9    2. Left wrist pain M25.532    3. Leukocytosis, unspecified type D72.829    4. Mouth sore K13.79    5. Hypotension, unspecified hypotension type I95.9        Disposition:  Patient discussed with Dr. Lr of the hospitalist service who will admit the patient to a telemetry bed in currently stable  condition, pending results of CT scan of the chest.    Scribe Disclosure:   I, Dahlia Olivas, am serving as a scribe on 3/10/2018 at 9:44 AM to personally document services performed by Yaneth Leon MD based on my observations and the provider's statements to me.     Dahlia Olivas  3/10/2018   [facility] EMERGENCY DEPARTMENT       Yaneth Leon MD  03/11/18 0725       Yaneth Leon MD  03/11/18 0727

## 2018-03-10 NOTE — ED NOTES
Bed: ED16  Expected date:   Expected time:   Means of arrival:   Comments:  Samuel [age]yo M chest pain

## 2018-03-10 NOTE — IP AVS SNAPSHOT
MRN:[id number]                      After Visit Summary   3/10/2018    Cricket Miguel    MRN: [id number]           Thank you!     Thank you for choosing [facility] for your care. Our goal is always to provide you with excellent care. Hearing back from our patients is one way we can continue to improve our services. Please take a few minutes to complete the written survey that you may receive in the mail after you visit with us. Thank you!        Patient Information     Date Of Birth          1955        Designated Caregiver       Most Recent Value    Caregiver    Will someone help with your care after discharge? yes    Name of designated caregiver Meghna    Phone number of caregiver [phone]    Caregiver address same as pt      About your hospital stay     You were admitted on:  March 10, 2018 You last received care in the:  [address] [facility]    You were discharged on:  March 11, 2018        Reason for your hospital stay       Dear Cricket Miguel    Your were hospitalized at [facility] with left arm pain, mental status and tooth pain and treated with antibiotics.  Today you are ready to be discharged home.  You should plan to follow up with the providers below, but if you develop fever, shortness of breath, light headedness, chest pain or palpitations please seek medical attention.    Please get the following tests done:  BMP in 7 days    Please set up an appointment with:  -Primary care provider within 7 days for lab draws and follow up on blood cultures  -Cardiologist within 7-14 days for further diuretic management and possible cardiac imaging studies (i.e. Cardiac MRI)  -Oromaxillary surgeon for consideration of tooth extraction    Your hospital unit at the time of discharge is 7C so if you have any questions please call the hospital at [phone] and ask to talk to a nurse on 7C.                  Who to Call     For medical emergencies, please call  [phone].  For non-urgent questions about your medical care, please call your primary care provider or clinic, [phone]          Attending Provider     Provider Specialty    Alex Moore MD Infectious Diseases    Servin, Walter Glaser MD Internal Medicine    Adiel Santa MD Internal Medicine       Primary Care Provider Office Phone # Fax #    Alfredito Garzon [phone]      After Care Instructions     Activity       Your activity upon discharge: activity as tolerated            Diet       Follow this diet upon discharge: Orders Placed This Encounter      Low Saturated Fat Na <2400 mg                  Follow-up Appointments     Adult [facility] Follow-up and recommended labs and tests       Follow up with primary care provider, Alfredito Garzon, within 7 days for hospital follow- up.  The following labs/tests are recommended: BMP.    Follow up with cardiology for diuretic management and consideration of cardiac MRI    Follow up with Oromaxillary surgeon regarding tooth extraction      Appointments on [facility]/or [facility] (with [facility] or [facility] provider or service). Call [phone] if you haven't heard regarding these appointments within 7 days of discharge.            Follow Up and recommended labs and tests       Follow up with primary care provider, Alfredito Garzon, within 7 days for hospital follow- up.  The following labs/tests are recommended: BMP.    Follow up with cardiology for diuretic management and consideration of cardiac MRI    Follow up with Oromaxillary surgeon regarding tooth extraction      Appointments on [facility]/or [facility] (with [facility] or [facility] provider or service). Call [phone] if you haven't heard regarding these appointments within 7 days of discharge.                  Your next 10 appointments already scheduled     Mar 16, 2018  3:00 PM CDT   New Patient Visit with MD SHASHA Fry [facility] ([facility])     "[id number]  [place] [address]   [phone]            Apr 02, 2018  1:00 PM CDT   Ech Complete with [facility] ([facility])    [address]  [facility] [facility] [id number] [phone]           1. Please bring or wear a comfortable two-piece outfit. 2. You may eat, drink and take your normal medicines. 3. For any questions that cannot be answered, please contact the ordering physician ***Please check-in at the [facility] located in [address] in the [facility] building. When you are finished registering, please go to [address] and have a seat. The technician will call your name for the test.            Apr 05, 2018  1:45 PM CDT   Return Visit with Alfredito Garzon MD   [facility] ([facility])    [address]  [place] [id number] [phone]              Pending Results     Date and Time Order Name Status Description    3/10/2018 1535 Blood culture ONE site Preliminary     3/10/2018 1011 Blood culture Preliminary     3/10/2018 1011 Blood culture Preliminary     3/10/2018 1006 Urine Culture Preliminary             Statement of Approval     Ordered          03/11/18 1331  I have reviewed and agree with all the recommendations and orders detailed in this document.  EFFECTIVE NOW     Approved and electronically signed by:  Morris Ruvalcaba DO             Admission Information     Date & Time Department Dept. Phone    3/10/2018 [address] [facility] [place] [phone]      Your Vitals Were     Blood Pressure Temperature Respirations Height Weight Pulse Oximetry    106/70 (BP Location: Left arm) 98.8  F (37.1  C) (Axillary) 18 1.727 m (5' 8\") 83 kg (182 lb 15.7 oz) 92%    BMI (Body Mass Index)                   27.82 kg/m2           MyChart Information     [facility] gives you secure access to your electronic health record. If you " see a primary care provider, you can also send messages to your care team and make appointments. If you have questions, please call your primary care clinic.  If you do not have a primary care provider, please call [phone] and they will assist you.        Care EveryWhere ID     This is your Care EveryWhere ID. This could be used by other organizations to access your [facility] medical records  [id number]        Equal Access to Services     DANIEL BILLS : Hadii aad ku hadasho Somargothali, waaxda luqadaha, qaybta kaalmada adeegyada, waxay joce calidinesh peng rona marcos waggoner. So [facility] [phone].    ATENCIÓN: Si darwin reece, tiene a mendiola disposición servicios gratuitos de asistencia lingüística. Llame al [phone].    We comply with applicable federal civil rights laws and [place] laws. We do not discriminate on the basis of race, color, national origin, age, disability, sex, sexual orientation, or gender identity.               Review of your medicines      CONTINUE these medicines which have NOT CHANGED        Dose / Directions    aspirin 81 MG chewable tablet   Used for:  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, Cerebrovascular accident (CVA) due to other mechanism (H)        Dose:  81 mg   1 tablet (81 mg) by Oral or Feeding Tube route daily   Quantity:  60 tablet   Refills:  1       atorvastatin 40 MG tablet   Commonly known as:  LIPITOR   Used for:  Cerebrovascular accident (CVA) due to other mechanism (H)        Dose:  40 mg   Take 1 tablet (40 mg) by mouth daily   Quantity:  60 tablet   Refills:  1       bisacodyl 5 MG EC tablet   Commonly known as:  DULCOLAX        Dose:  5 mg   Take 5 mg by mouth 2 times daily as needed for constipation   Refills:  0       bumetanide 1 MG tablet   Commonly known as:  BUMEX        Dose:  1 mg   Take 1 mg by mouth daily   Refills:  0       Carboxymethylcellulose Sod PF 0.5 % Soln ophthalmic solution   Commonly known as:  REFRESH PLUS    Used for:  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        Dose:  1 drop   Place 1 drop Into the left eye 3 times daily as needed (to flush debris from eye)   Quantity:  1 Bottle   Refills:  1       carvedilol 12.5 MG tablet   Commonly known as:  COREG   Used for:  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, Hypertension goal BP (blood pressure) < 140/90        Dose:  12.5 mg   Take 1 tablet (12.5 mg) by mouth 2 times daily (with meals)   Refills:  0       famotidine 20 MG tablet   Commonly known as:  PEPCID   Used for:  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        Dose:  20 mg   Take 1 tablet (20 mg) by mouth 2 times daily   Quantity:  60 tablet   Refills:  1       iron 325 (65 FE) MG tablet        Dose:  1 tablet   Take 1 tablet by mouth every evening   Refills:  0       losartan 50 MG tablet   Commonly known as:  COZAAR   Used for:  Hypertension goal BP (blood pressure) < 140/90, Congestive heart failure, NYHA class 2, unspecified congestive heart failure type (H), Cardiomyopathy, unspecified type (H)        TAKE 1/2 TABLET BY MOUTH EVERY 12 HOURS   Quantity:  90 tablet   Refills:  3       mirtazapine 7.5 MG Tabs tablet   Commonly known as:  REMERON   Used for:  Mild single current episode of major depressive disorder (H), Anxiety        Dose:  22.5 mg   Take 3 tablets (22.5 mg) by mouth At Bedtime   Quantity:  90 tablet   Refills:  3       multivitamin, therapeutic with minerals Tabs tablet   Used for:  Cerebrovascular accident (CVA) due to other mechanism (H)        Dose:  1 tablet   Take 1 tablet by mouth daily   Quantity:  30 each   Refills:  0       OLANZapine 2.5 MG tablet   Commonly known as:  zyPREXA   Used for:  Cerebrovascular accident (CVA) due to other mechanism (H), Anxiety, Delirium due to another medical condition        Dose:  2.5 mg   Take 1 tablet (2.5 mg) by mouth At Bedtime   Quantity:  60 tablet    Refills:  0       order for DME   Used for:  Cerebrovascular accident (CVA), unspecified mechanism (H), Chronic systolic heart failure (H), Congestive heart failure, NYHA class 2, unspecified congestive heart failure type (H), COPD, mild (H), Acute bacterial endocarditis, S/P AVR (aortic valve replacement), H/O aortic root repair        Equipment being ordered: Hospital Bed   Quantity:  1 Device   Refills:  0       polyethylene glycol powder   Commonly known as:  MIRALAX/GLYCOLAX        Dose:  17 g   Take 17 g by mouth every evening as needed for constipation   Refills:  0       Potassium Chloride ER 20 MEQ Tbcr   Used for:  Hypokalemia        Dose:  20 mEq   Take 1 tablet (20 mEq) by mouth 2 times daily   Quantity:  60 tablet   Refills:  3       senna-docusate 8.6-50 MG per tablet   Commonly known as:  SENOKOT-S;PERICOLACE   Used for:  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        Dose:  1-2 tablet   Take 1-2 tablets by mouth 2 times daily as needed for constipation   Quantity:  100 tablet   Refills:  0                Protect others around you: Learn how to safely use, store and throw away your medicines at www.disposemymeds.org.             Medication List: This is a list of all your medications and when to take them. Check marks below indicate your daily home schedule. Keep this list as a reference.      Medications           Morning Afternoon Evening Bedtime As Needed    aspirin 81 MG chewable tablet   1 tablet (81 mg) by Oral or Feeding Tube route daily   Last time this was given:  81 mg on 3/11/2018  9:23 AM                                atorvastatin 40 MG tablet   Commonly known as:  LIPITOR   Take 1 tablet (40 mg) by mouth daily   Last time this was given:  40 mg on 3/11/2018  9:23 AM                                bisacodyl 5 MG EC tablet   Commonly known as:  DULCOLAX   Take 5 mg by mouth 2 times daily as needed for constipation                                bumetanide  1 MG tablet   Commonly known as:  BUMEX   Take 1 mg by mouth daily                                Carboxymethylcellulose Sod PF 0.5 % Soln ophthalmic solution   Commonly known as:  REFRESH PLUS   Place 1 drop Into the left eye 3 times daily as needed (to flush debris from eye)                                carvedilol 12.5 MG tablet   Commonly known as:  COREG   Take 1 tablet (12.5 mg) by mouth 2 times daily (with meals)   Last time this was given:  12.5 mg on 3/11/2018  9:23 AM                                famotidine 20 MG tablet   Commonly known as:  PEPCID   Take 1 tablet (20 mg) by mouth 2 times daily   Last time this was given:  20 mg on 3/11/2018  9:24 AM                                iron 325 (65 FE) MG tablet   Take 1 tablet by mouth every evening                                losartan 50 MG tablet   Commonly known as:  COZAAR   TAKE 1/2 TABLET BY MOUTH EVERY 12 HOURS                                mirtazapine 7.5 MG Tabs tablet   Commonly known as:  REMERON   Take 3 tablets (22.5 mg) by mouth At Bedtime                                multivitamin, therapeutic with minerals Tabs tablet   Take 1 tablet by mouth daily                                OLANZapine 2.5 MG tablet   Commonly known as:  zyPREXA   Take 1 tablet (2.5 mg) by mouth At Bedtime                                order for DME   Equipment being ordered: Hospital Bed                                polyethylene glycol powder   Commonly known as:  MIRALAX/GLYCOLAX   Take 17 g by mouth every evening as needed for constipation                                Potassium Chloride ER 20 MEQ Tbcr   Take 1 tablet (20 mEq) by mouth 2 times daily                                senna-docusate 8.6-50 MG per tablet   Commonly known as:  SENOKOT-S;PERICOLACE   Take 1-2 tablets by mouth 2 times daily as needed for constipation

## 2018-03-10 NOTE — ED NOTES
Pt c/o of chest pain, left arm pain, sores in mouth and cough. ABC intact, pt alert.     EMS states BP was 80's systolic-pt given 100ml NS.Blood sugar in the 130's

## 2018-03-11 NOTE — PROGRESS NOTES
[facility]  Transfer Triage Note    Date of call: 03/10/18  Time of call: 4 or 5 james    Reason for Transfer  Diagnosis: hx of recurrent MSSA endocarditis w/ chest pain and Lf arm pain.      Outside Records:   Additional records requested to be faxed to [phone].    Stability of Patient: stable    Expected Time of Arrival for Transfer: tonight    62 year old male w/ 2 episodes of MSSA endocarditis: 1st episode at that time native aortic valve replaced, 2nd episode of endocarditis w/ re-do surgery.  Presented to outside ER w/ a few days of mouth pain seen by his dentist and cardiologist, felt that a procedure was too risky.  2 days being less interactive, not eating, mouth pain, chest pain, left extremity arm pain.  CTA chest w/ aortic root fluid collection that was known to be present but possibly enlarging in size, can not exclude aortic root abscess.  WBC 12.4.  No fevers.  HR . BP stable.  No fevers.  Started ancef IV and transfer to [facility] for further evaluation.  BCx's drawn.        Alex Moore MD

## 2018-03-11 NOTE — H&P
"[facility]    Internal Medicine History and Physical - [facility] Service       Date of Admission:  3/11/2017    Chief Complaint   Patient refused to answer questions. Per significant other, \"not feeling well\"     History is obtained from the patient's significant other as patient was too hostile from being awoken to answer questions.     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     History is obtained from the patient's significant other as patient was too hostile from being awoken to answer questions.     Per significant other, patient has been feeling \"unwell\" for the past 2 days. He woke up suddenly one morning feeling unwell. Significant other could not illicit a specific response from patient in terms of exactly how he was feeling. For the past four days, patient has noted painful sores in his mouth. He also has a tooth that needs to be extracted. However, patient has been going back and forth between his cardiologist and dentist regarding the safety of the procedure. He woke up this morning with sudden onset left sided pain that he could not provide much history regarding. No trauma or falls recently. Significant other denies any recent fevers, chills, night sweats, cough, sore throat, chest pain, dyspnea, palpitations, abdominal pain, N/V, diarrhea, numbness tingling, worsening baseline weakness or new weakness, confusion, headaches that she has noticed. New " medications include addition of iron supplement approximately 1 week ago. Denies any recent travel, sick contacts. Smokes ~1 joint of marijuana/day for over 20 years.     ED workup: X-ray of left wrist without any acute fractures. CXR with few scattered benign granulomas bilaterally. CTA chest with moises-ascending thoracic aorta fluid collection that was known to be present but possibly enlarging in size, can not exclude aortic root abscess.  WBC 12.4.  No fevers.  HR . BP stable. Blood cultures drawn. Given ancef IV and transferred to [facility] for further evaluation.      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 Procedures include: redo sternotomy, femoral arterial and venous cannulation for cardiopulmonary bypass, aortic root and aortic valve replacement with a 26 mm [employer] valve conduit homograft, intraoperative OWEN on 12/19/2017 by Dr. Pili Bowers and Dr. Charles Nogeuira.       Review of Systems   The 10 point Review of Systems is negative other than noted in the HPI or here.     Past Medical History    I have reviewed this patient's medical history and updated it with pertinent information if needed.   Past Medical History:   Diagnosis Date     Abscess of aortic root 09/2017     AI (aortic insufficiency)     severe     Anxiety      Aortic root dilatation (H)      AR (aortic regurgitation)     severe     Cardiomyopathy (H)      CHF (congestive heart failure), NYHA class II (H)      COPD, mild (H)     spirometry 12/16     CVA (cerebral vascular accident) (H) 09/2017    septic emboli - MSSA - cerebellum, Left MCA, Rt Occipital, Aphasia, Left facial paralysis, Right UE/LE weakness, Left visual field cut, moderate to severe encephalopathy     Depression 09/2017     Heart murmur      Hyperlipidemia LDL goal <70 10/31/2010     Hypertension goal BP (blood  pressure) < 140/90      Inguinal hernia      left lung nodule  1/29/2008     Major depressive disorder, single episode, moderate (H)      Psoriasis childhood     SNHL (sensorineural hearing loss)     Left, secondary to CVA     Tobacco use disorder         Past Surgical History   I have reviewed this patient's surgical history and updated it with pertinent information if needed.  Past Surgical History:   Procedure Laterality Date     ARTHROSCOPY KNEE INCISION AND DRAINAGE Left 10/8/2017    Arthroscopic Incision and Drainage of Left Knee - Dr Munoz     HC SHOULDER ARTHROSCOPY, DX  2001    Arthroscopy, Shoulder RT Dr OSIRIS Andersen     HC SHOULDER ARTHROSCOPY, DX  1/04    LT arthroscopy Dr OSIRIS Andersen     HERNIA REPAIR, UMBILICAL  1/08    incarcerated - dr rockwell     HERNIORRHAPHY INGUINAL  12/21/2012    HERNIORRHAPHY INGUINAL;  Right Inguinal Hernia Repair with mesh ;  Surgeon: Mello Rockwell MD;  Location: [facility] OR     REPAIR ANEURYSM ASCENDING AORTA N/A 12/19/2017    REPAIR ANEURYSM ASCENDING AORTA;  REDO Median STERNOTOMY, FEMORAL CANNULATION, Lysis of adhesions, AORTIC ROOT VALVE HOMOGRAFT with 26mm x 7.0cm Cry[employer]ife Aortic valve and conduit - Dr Bowers     REPLACE VALVE AORTIC N/A 5/17/2016    REPLACE VALVE AORTIC - Dr Bowers     ROTATOR CUFF REPAIR RT/LT  11/10    Rt arthroscopy - Dr OSIRIS Andersen     SURGICAL HISTORY OF -   5/16    AVR, severe AR, aortic root repair     TRACHEOSTOMY PERCUTANEOUS  10/27/2017             Social History   Social History   Substance Use Topics     Smoking status: Former Smoker     Packs/day: 1.00     Years: 35.00     Quit date: 4/1/2016     Smokeless tobacco: Never Used      Comment: 4/2016     Alcohol use 0.0 oz/week     0 Standard drinks or equivalent per week      Comment: once monthly       Family History   I have reviewed this patient's family history and updated it with pertinent information if needed.   Family History   Problem Relation Age of Onset     Genetic Disorder Mother   "    Bone plateover growth Agustin. shoulder surgeries     CANCER Mother      brain cancer     Alzheimer Disease Father        Prior to Admission Medications   Cannot display prior to admission medications because the patient has not been admitted in this contact.     Allergies   Allergies   Allergen Reactions     Lisinopril Swelling     One-sided facial swelling after being on lisinopril/HCTZ for one week.        Physical Exam   /78  Temp 98.5  F (36.9  C) (Oral)    General Appearance: lying in bed, hostile   HEENT: sclerae anicteric, drooping right eye. Would refuse to let me examine his oropharynx  Respiratory: CTAB on anterior auscultation   Cardiovascular: distant heart sounds, did not appreciate any murmurs. Sternotomy scar intact without surrounding erythema or purulence.   GI: soft, non-tender, non-distended, +BS  Genitourinary: deferred  Skin: no skin rashes on exposed skin  Musculoskeletal: deferred  Neurologic: Patient refused to allow me to do a full neuro exam. CN II, XII, 4/5 strength in plantar/dorsiflexion of left foot, 5/5 strength on the right. 3/5 strength in RLE. 5/5 strength LLE. 5/5 strength in LUE. Would not let me assess strength in RUE.   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       # CT with moises-ascending thoracic aorta fluid collection  # Leukocytosis (WBC 12.4)   Afebrile and HD stable. No murmurs appreciated. Leukocytosis present. CT chest with findings showing a " fluid collection versus possible old hemorrhage around the ascending thoracic aorta with concern for underlying infection in his chest. Was previously treated in 10/2017 with levofloxacin for 4 weeks, and nafcillin and rifampin for abscess. Received ancef in ED. Last echo on 12/2017 showed circumferential free space surrounding the aortic graft, dehiscence and rocking of the graft from the native aorta and severe AI in the space surrounding the aortic graft (perivalvular/perigraft) with LVEF of 20-25%.   - MRSA swab   - thoracic sx consulted; appreciate recs   - TTE pending   - antibiotics   - ancef 3/10-     # Acute kidney injury  Creatinine 1.21 on admission. Baseline ~0.8. Likely prerenal in setting of poor nutritional status.  - avoid nephrotoxic medications  - hold pta bumex and losartan  - continue to monitor     # History of AS and aortic aneurysm s/p Bentall c/b endocarditis and severe AI s/p aortic root/valve hemograft (12/19/17)  # Chronic systolic heart failure   # CAD  - continue ASA 81mg daily   - held pta bumex 1mg daily  - continue pta carvedilol 12.5mg BID  - held losartan 25mg q12h     # Chronic normocytic anemia  Hemoglobin 12.3 on admission. Baseline ~8.6. No signs of active bleeding.  - continue pta iron supplement  - continue to monitor     # HTN - continue pta carvedilol   # HLD - continue atorvastatin   # GERD - continue pta famotidine   # Depression - continue pta mirtazapine, olanzapine       Diet: NPO  Fluids: s/p 2L NS  DVT Prophylaxis: Padua score 2. Mechanical prophylaxis. Ambulation.  Code Status: DNR/DNI    Disposition Plan   Admit to general medicine.      Entered: Robert Raines 03/10/2018, 10:32 PM   Information in the above section will display in the discharge planner report.      Robert Raines  Medicine [facility]   Pager: [phone]  Please see sticky note for cross cover information      Data   Data     Recent Labs  Lab  03/10/18  0945   WBC 12.4*   HGB 12.3*   MCV 94         POTASSIUM 4.2   CHLORIDE 104   CO2 29   BUN 24   CR 1.21   ANIONGAP 6   IZABELLA 9.0   *   ALBUMIN 3.0*   PROTTOTAL 7.7   BILITOTAL 0.4   ALKPHOS 114   ALT 17   AST 19   TROPI <0.015     Recent Results (from the past 24 hour(s))   Arm, left, venous US    Narrative    ULTRASOUND VENOUS UPPER EXTREMITY LEFT 3/10/2018 11:28 AM     HISTORY: Swelling, pain.     COMPARISON: None.    TECHNIQUE: Ultrasound gray scale, Color Doppler flow, and spectral  Doppler waveform analysis performed.    FINDINGS: There are no intraluminal filling defects. The left internal  jugular, axillary, cephalic, basilic, brachial, radial, and ulnar  veins demonstrate normal compressibility. The left internal jugular,  innominate, subclavian, and axillary veins have normal venous  waveforms.      Impression    IMPRESSION: No evidence for DVT.    YESI TANNER MD   Wrist XR, G/E 3 views, left    Narrative    LEFT WRIST THREE VIEWS 3/10/2018 11:51 AM     HISTORY: Pain, swelling.     COMPARISON: None.      Impression    IMPRESSION: No evidence of fracture. Osteopenia. Calcifications in the  triangular fibrocartilage.    FERNANDO BAILEY MD   XR Chest 2 Views    Narrative    CHEST TWO VIEWS 3/10/2018 11:52 AM     HISTORY: Chest pain.     COMPARISON: 12/31/2017.      Impression    IMPRESSION: Interval removal of the previously seen right-sided PICC  line. Prior median sternotomy again noted. Few scattered calcified  benign granulomata bilaterally. No active cardiopulmonary disease.    DIANDRA ROSE MD   Chest CT, IV contrast only - PE protocol    Narrative    CT CHEST PULMONARY EMBOLISM WITH CONTRAST  3/10/2018 2:42 PM     HISTORY: Chest pain.    TECHNIQUE: Helical axial scans from lung apices through lung bases  with 73 mL Isovue-370 IV contrast. Radiation dose for this scan was  reduced using automated exposure control, adjustment of the mA and/or  kV according to patient size,  or iterative reconstruction technique.    COMPARISON: 12/17/2017    FINDINGS:  There is no CT evidence for pulmonary embolism or other  acute vascular abnormality of the chest. Vascular calcifications are  seen, including coronary artery calcifications. Prior median  sternotomy is again noted. There is a fluid collection around the  ascending aorta which is mostly anterior and measures up to 3.0 cm in  thickness. The prior exam showed a similar collection which  communicated with the ascending aorta. The current collection does not  appear to communicate with any of the vascular structures. It  currently may represent an old hematoma. The collection does not have  a significantly enhancing wall but this does not exclude an infected  collection. No other mediastinal or hilar abnormalities. A few  scattered calcified granulomata in the lungs bilaterally. Small amount  of linear scarring in the posterior right lung base. No confluent  infiltrates. Visualized upper abdomen shows a 1.3 cm lucency in the  left lobe of the liver anteriorly which was present on the prior exam  and is most likely a benign cyst.      Impression    IMPRESSION:  1. No evidence for pulmonary embolism.  2. Collection of fluid or old hemorrhage around the ascending thoracic  aorta. This does not communicate with the ascending aorta or any other  vascular structure as seen on the previous study from 12/17/2017 and  may represent an old hematoma. An infected collection is not excluded.  3. Old granulomatous disease in lungs bilaterally.    DIANDRA ROSE MD     Internal Medicine Staff Addendum  Date of Service: 3/11/2018  I have seen and examined Mr. Miguel, reviewed the data and discussed the plan of care with the patient, his wife, and the care team on P&FC Rounds.  I agree with the above documentation     I discussed pt's care with bedside RN, case management/social work today.  Initially transferred to [facility] with concerns that he had a new  active cardiac issue, however, with review of imaging and cardiac echo, unlikely an active medical issue.  Hence although initially transferred with expectation of a prolonged hospital stay, the morning after admission, with further imaging and trend of clinical course, he was able to be discharged with outpatient follow up.  I personally reviewed imaging labs, medications and past 24 hr notes.  Assessment/Plan/Diagnoses: plan/dx as above, which contains my edits and reflects our joint medical decision-making.     Adiel Santa MD  Internal Medicine/Pediatrics Hospitalist & Staff Physician   of Internal Medicine and Pediatrics  [facility]  Pager: [phone]

## 2018-03-11 NOTE — PLAN OF CARE
Pt DC today at 1640. Pt was wheeled out by significant other and RN. Pt used his personal wheelchair. Education was completed with his children and then with his significant other. MD made a list of diagnosis for both children and his significant other to carry with them per request. RN printed a lab list for his significant other. PIV x2 removed. No meds to  from pharmacy. No issues with DC. His previous surgeon visited him in his room before DC as well. All family questions appeared to be answered as best as we can at this time. Follow up appt scheduled.

## 2018-03-11 NOTE — PLAN OF CARE
Problem: Patient Care Overview  Goal: Plan of Care/Patient Progress Review  Focus:  Admission  Diagnosis:   Admitted from: [facility] ER  Via: stretcher   Accompanied by: spouse  Belongings: Placed in closet; valuables sent home with family.   Admission paperwork: complete.   Teaching: Call don't fall, use of console, meal times, visiting hours, orientation to unit, when to call for the RN (angina/sob/dizzyness, etc.), and stressed the importance of safety.   Access: PIV   Telemetry: Placed on pt   Ht./Wt.: complete

## 2018-03-11 NOTE — PLAN OF CARE
Problem: Patient Care Overview  Goal: Plan of Care/Patient Progress Review  D: Pt admitted from [facility] ER with chest pain and CT showing fluid around thoracic aorta. Pt stable, SR. Febrile, t-max 99.5 axillary. Pt has PMH of sepsis x 2, per wife. Left wrist pain inadequately relieved with cold packs and tylenol. No shortness of breath noted. Pt had CVA in October 2017 with right-side movement deficits, and left-side facial droop and slurred speech. Pt is Assist of 2 with gait belt to transfer to wheelchair. Pt c/o mouth sores and has visible swelling on L side of face. IV Abx given as ordered.  I: Monitored/assessed pt. Assisted with cares.  A: Pt stable and comfortable.  P: Continue to monitor/assess pt, contact provider with concerns.

## 2018-03-11 NOTE — CONSULTS
"CARDIOTHORACIC SURGERY CONSULTATION    Referring Provider:  Internal Medicine  Primary Care Provider:   Alfredito Garzon  Indication for Consultation:  Abnormal findings on CT scan.       HPI   62 year old male with history of prosthetic valve endocarditis s/p Aortic root replacement with homograft 12/2017. He presents to the hospital with a 2 day history of \"not feeling well\" per significant other. CT scan of the chest showed a periaortic hematoma without evidence of extraluminal contrast.       ________________________________________________________________________    PAST MEDICAL HISTORY:  Past Medical History:   Diagnosis Date     Abscess of aortic root 09/2017     AI (aortic insufficiency)     severe     Anxiety      Aortic root dilatation (H)      AR (aortic regurgitation)     severe     Cardiomyopathy (H)      CHF (congestive heart failure), NYHA class II (H)      COPD, mild (H)     spirometry 12/16     CVA (cerebral vascular accident) (H) 09/2017    septic emboli - MSSA - cerebellum, Left MCA, Rt Occipital, Aphasia, Left facial paralysis, Right UE/LE weakness, Left visual field cut, moderate to severe encephalopathy     Depression 09/2017     Heart murmur      Hyperlipidemia LDL goal <70 10/31/2010     Hypertension goal BP (blood pressure) < 140/90      Inguinal hernia      left lung nodule  1/29/2008     Major depressive disorder, single episode, moderate (H)      Psoriasis childhood     SNHL (sensorineural hearing loss)     Left, secondary to CVA     Tobacco use disorder        PAST SURGICAL HISTORY:  Past Surgical History:   Procedure Laterality Date     ARTHROSCOPY KNEE INCISION AND DRAINAGE Left 10/8/2017    Arthroscopic Incision and Drainage of Left Knee - Dr Munoz     HC SHOULDER ARTHROSCOPY, DX  2001    Arthroscopy, Shoulder RT Dr OSIRIS Andersen     HC SHOULDER ARTHROSCOPY, DX  1/04    LT arthroscopy Dr OSIRIS Andersen     HERNIA REPAIR, UMBILICAL  1/08    incarcerated - dr puente     HERNIORRHAPHY " INGUINAL  12/21/2012    HERNIORRHAPHY INGUINAL;  Right Inguinal Hernia Repair with mesh ;  Surgeon: Mello Rockwell MD;  Location: [facility] OR     REPAIR ANEURYSM ASCENDING AORTA N/A 12/19/2017    REPAIR ANEURYSM ASCENDING AORTA;  REDO Median STERNOTOMY, FEMORAL CANNULATION, Lysis of adhesions, AORTIC ROOT VALVE HOMOGRAFT with 26mm x 7.0cm [employer]ife Aortic valve and conduit - Dr Bowers     REPLACE VALVE AORTIC N/A 5/17/2016    REPLACE VALVE AORTIC - Dr Bowers     ROTATOR CUFF REPAIR RT/LT  11/10    Rt arthroscopy - Dr OSIRIS Andersen     SURGICAL HISTORY OF -   5/16    AVR, severe AR, aortic root repair     TRACHEOSTOMY PERCUTANEOUS  10/27/2017            ALLERGIES  Lisinopril    FAMILY HX:  Family History   Problem Relation Age of Onset     Genetic Disorder Mother      Bone plateover growth Agustin. shoulder surgeries     CANCER Mother      brain cancer     Alzheimer Disease Father        SOCIAL HX:  Social History     Social History     Marital status:      Spouse name: N/A     Number of children: 3     Years of education: 12     Social History Main Topics     Smoking status: Former Smoker     Packs/day: 1.00     Years: 35.00     Quit date: 4/1/2016     Smokeless tobacco: Never Used      Comment: 4/2016     Alcohol use 0.0 oz/week     0 Standard drinks or equivalent per week      Comment: once monthly     Drug use: Yes      Comment: marijuana- daily previously     Sexual activity: Yes     Partners: Female     Other Topics Concern     Caffeine Concern Yes     3 cups     Sleep Concern No     Special Diet No     trying to watch salt     Exercise Yes     walking     Seat Belt No     Parent/Sibling W/ Cabg, Mi Or Angioplasty Before 65f 55m? No     Social History Narrative       ROS:  Constitutional: No fever, chills, or sweats. No weight gain/loss.   ENT: No visual disturbance, ear ache, epistaxis, sore throat.   Allergies/Immunologic: Negative.   Respiratory: cough, hemoptysis.   Cardiovascular: As per HPI.   GI: No nausea,  "vomiting, hematemesis, melena, or hematochezia.   : No urinary frequency, dysuria, or hematuria.   Integument: No rash, ecchymosis.    Neuro: Negative.   Endocrinology: Negative.   Musculoskeletal: No myalgia.    VITAL SIGNS:  BP 97/75  Temp 98.5  F (36.9  C) (Axillary)  Resp 18  Ht 1.727 m (5' 8\")  Wt 83 kg (182 lb 15.7 oz)  SpO2 93%  BMI 27.82 kg/m2  Body mass index is 27.82 kg/(m^2).  Wt Readings from Last 2 Encounters:   03/10/18 83 kg (182 lb 15.7 oz)   02/19/18 81.2 kg (179 lb)       PHYSICAL EXAM  Alert and oriented x 3. NAD  Bilateral breath sounds.   RRR  Abd soft benign.     LABS    Recent Labs   Lab Test  03/11/18   0718  03/10/18   0945   HGB  11.7*  12.3*   HCT  37.6*  37.8*     Recent Labs   Lab Test  03/11/18   0718  03/10/18   0945   NA  140  139   POTASSIUM  4.0  4.2   CHLORIDE  105  104   CO2  27  29   ANIONGAP  9  6   GLC  100*  124*   BUN  21  24   CR  1.21  1.21   IZABELLA  8.9  9.0     Recent Labs   Lab Test  10/13/17   0357 04/03/16 02/07/12   1214   CHOL   --   105*  198   HDL   --   25  47   LDL   --   56  123   TRIG  265*  120  138   CHOLHDLRATIO   --    --   4.2        ECHO: Pending     CT SCAN:    1. No evidence for pulmonary embolism.  2. Collection of fluid or old hemorrhage around the ascending thoracic  aorta. This does not communicate with the ascending aorta or any other  vascular structure as seen on the previous study from 12/17/2017 and  may represent an old hematoma. An infected collection is not excluded.  3. Old granulomatous disease in lungs bilaterally.    HOUSE STAFF ASSESSMENT AND PLAN:   62 year old male with history of prosthetic valve endocarditis s/p aortic root replacement with homograft.     -Follow up on Echo results  -Periaortic fluid collection may represent old hematoma.   -No need for surgical intervention at this time from CT surgery standpoint.             Fili Pathak MD  "

## 2018-03-12 NOTE — TELEPHONE ENCOUNTER
Please see 03/12/2018 & 03/07/2018 telephone encounters  Duplicate    Joycelyn Jones RN  [place] [place] [facility]

## 2018-03-12 NOTE — TELEPHONE ENCOUNTER
Meghna, Caregiver needing orders for outpatient PT, OT, SLP. Please advise  Meghna: [phone]  Thank you  Gisele Kwon

## 2018-03-12 NOTE — TELEPHONE ENCOUNTER
Patient discharged from [facility] for inpatient hospital stay on 3/11 for hx of aortic root repair, chest pain, chest hematoma.    Please contact patient to follow up; no appointment scheduled at this time.    ER / IP:  4/3    Care Coordination:  active      Marge Heath

## 2018-03-12 NOTE — TELEPHONE ENCOUNTER
Unsure why this encounter was closed - please see 03/12/2018 telephone encounters regarding this request    Joycelyn Jones RN  [facility]

## 2018-03-12 NOTE — TELEPHONE ENCOUNTER
"Discharge Instructions    \"Let's review your discharge instructions.  What is/are the follow-up recommendations?  Pt. Response: FU with PCP w/in 7 days, cardiologist    \"Were you instructed to make a follow-up appointment?\"  Pt. Response: Yes.  Has appointment been made?   No.  \"Can I help you schedule that appointment?\" 03/16/2018 @ 420pm      \"When you see the provider, I would recommend that you bring your discharge instructions with you.    Medications    \"How many new medications are you on since your hospitalization/ED visit?\"    0-1  \"How many of your current medicines changed (dose, timing, name, etc.) while you were in the hospital/ED visit?\"   0-1  \"Do you have questions about your medications?\"   No  \"Were you newly diagnosed with heart failure, COPD, diabetes or did you have a heart attack?\"   Yes - Care Coordination Referral needed (Care Coordination/ already involved per pt's spouse)  For patients on insulin: \"Did you start on insulin in the hospital or did you have your insulin dose changed?\"   No    Medication reconciliation completed? Yes    Was MTM referral placed (*Make sure to put transitions as reason for referral)?   No    Call Summary    \"Do you have any questions or concerns about your condition or care plan at the moment?\"    No  Triage nurse advice given: FU with PCP/cardiologist, call with questions/concerns or new/worsening sx.    Patient was in ER 4 in the past year (assess appropriateness of ER visits.)      \"If you have questions or things don't continue to improve, we encourage you contact us through the main clinic number,  [phone].  Even if the clinic is not open, triage nurses are available 24/7 to help you.     We would like you to know that our clinic has extended hours (provide information).  We also have urgent care (provide details on closest location and hours/contact info)\"      \"Thank you for your time and take care!\"      Joycelyn Jones RN  [facility] " Triage

## 2018-03-12 NOTE — TELEPHONE ENCOUNTER
Name of caller: mejia  Relationship to Patient: fv home care    Reason for Call:  Patient was in hospital 3/10-3/11.  They have not received resumption of home care orders.  Patient feels ready to go outpatient.  Phone call from 3/7 about ot/pt at [facility].  These orders need to be placed ASAP so this can be initiated.    Best phone number to reach pt at is: [phone]  Ok to leave a message with medical info? yes    Marge Heath

## 2018-03-12 NOTE — TELEPHONE ENCOUNTER
Called Meghna @ # below    Requesting outpatient orders to [facility] in [place] for PT, OT Speech Therapy (please see 03/07/2018 telephone encounter - original request)    Called Melanie @ [phone] - states the referrals just need to be placed in [facility] and they will go to a central scheduling work que that will call the patient to schedule    Referrals placed       Joycelyn Jones RN  [place] Triage

## 2018-03-12 NOTE — DISCHARGE SUMMARY
[facility], [place]    Internal Medicine Discharge Summary- Kel Service    Date of Admission:  3/10/2018  Date of Discharge:  3/11/2018  4:43 PM  Discharging Attending Provider: Dr. Santa  Discharge Team: Kel 3    Discharge Diagnoses   Raisa-ascending thoracic aorta fluid collection  Leukocytosis  ?Root fracture vs remnant of lower incisor   Aphthous ulcer  Acute kidney injury  HFrEF  CAD  Chronic normocytic anemia  Hypertension  Hyperlipidemia  GERD  Depression    Follow-ups Needed After Discharge     1. PCP within 7 days for BMP recheck, diuretic management and f/u blood cx  2.  Cardiology within 2 weeks for evaluation of possible cardiac MRI for further surveillance of heart failure  3.  Oral maxillary surgeon for tooth extraction    Hospital Course   Cricket Miguel was admitted on 3/10/2018.  Patient was initially admitted to [facility] for evaluation of chest pain.  He underwent a CT PE which was negative for pulmonary embolism but noted a fluid collection around the ascending thoracic aorta is transferred to [facility] for further evaluation.  Upon arrival patient notes that his primary complaint was tooth pain and left arm pain.  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  CT demonstrated fluid collection versus old possible, around the ascending thoracic aorta.  Patient was started on Ancef in the ED with concern for possible underlying infection/abscess.  He was evaluated by cardiovascular thoracic surgery with no intervention indicated at this time.  Of note, patient has a history Bentall procedure which complicated by dehiscence of graft from native aorta in December 2017 with echo demonstrating circumferential free space noted surrounding  graft and severe AI in the space surrounding the aortic graft.  Repeat echocardiogram with grossly improved LV function, but unable to assess EF given poor windows.  Homograft while seated.  Discussed with cardiologist and fluid collection likely represents postoperative findings.  -Patient should follow-up with cardiology within 2 weeks  -Cardiac MRI should be considered future for further assessment of cardiac function    Leukocytosis  ?Root fracture vs remnant of lower incisor   Patient edentulous with prior dentures although these led to abrasions per patient.  Wife states the patient has retained vs broken lower incisor.  This issue has been discussed with her cardiologist and dentist with plans to extract tooth as outpatient.  Oral maxillary surgeon referral made by dentist.  No tenderness to palpation or signs or symptoms concerning for abscess.  No apparent asymmetric facial swelling or dysphagia.   -Follow-up with primary dentist, cardiology, and oral maxillary surgery for outpatient evaluation of tooth extraction  -Would likely benefit from antibiotic prophylaxis given past history of endocarditis    Aphthous ulcer  Noted to have 2 small non-bleeding aphthous  ulcers in his upper gumline.  Patient not complaining of pain   States they are improving.  He may continue using Orajel which he has at home as needed.     Acute kidney injury  Baseline creatinine 0.8 although somewhat variable in the setting of cardiac disease.  Serum creatinine 1.21 present on admission, likely prerenal in nature in the setting of poor nutritional status.  On Bumex and losartan initially held.  These were resumed at discharge given increasing weight and cardiac risk factors.  He should be followed closely by his PCP for further evaluation within 1 week of discharge.     HFrEF  CAD  Previous echocardiogram in December 2017 demonstrated ejection fraction of 20-25%.  Repeat TTE grossly improved, although for windows prohibitive  quantify ejection fraction.  Home aspirin and carvedilol were continued during admission.  Bumex and losartan held and resumed at discharge as above.     Chronic normocytic anemia  Baseline hemoglobin 8.6, with hemoglobin of 12.3 on admission.  No signs of active bleeding.  He was continued on his iron supplement.    Hypertension -continued carvedilol    Hyperlipidemia-continue atorvastatin    GERD -continued famotidine    Depression -continued mirtazapine, olanzapine    # Discharge Pain Plan:   - Patient currently has NO PAIN and is not being prescribed pain medications on discharge.    Consultations This Hospital Stay   CARDIOTHORACIC SURGERY ADULT IP CONSULT    Code Status   DNR / DNI       The patient was discussed with Dr. Arina Ruvalcaba  [facility]  Pager: [phone]  ______________________________________________________________________    Physical Exam   Vital Signs: Temp: 98.8  F (37.1  C) Temp src: Axillary BP: 106/70   Heart Rate: 64 Resp: 18 SpO2: 92 % O2 Device: None (Room air)    Weight: 182 lbs 15.71 oz    General Appearance: No acute distress, resting in bed, irritated  HEENT: Posterior oropharynx nonerythematous, no exudates, edentulous, no tenderness to palpation of gumline, 2 small aphthous ulcers noted on mid to left upper gumline  Respiratory: Clear to auscultation bilaterally over anterior lung fields, no wheezes or rales  Cardiovascular: RRR, no murmurs gallops or rubs.  Sternotomy scar intact without any erythema, induration or discharge.  GI: Soft, nontender, nondistended, normoactive bowel sounds  Skin: No suspicious lesions or rashes overexposed areas  MSK: No erythema, warmth, or swelling over bilateral wrists elbows or shoulders    Significant Results and Procedures   Most Recent 3 CBC's:  Recent Labs   Lab Test  03/11/18   0718  03/10/18   0945  02/19/18   1227   WBC  8.9  12.4*  10.9   HGB  11.7*  12.3*  13.5   MCV  95  94  97   PLT  280  305  304     Most  Recent 3 BMP's:  Recent Labs   Lab Test  03/11/18   0718  03/10/18   0945  02/19/18   1227   NA  140  139  141   POTASSIUM  4.0  4.2  4.1   CHLORIDE  105  104  105   CO2  27  29  30   BUN  21  24  26   CR  1.21  1.21  1.17   ANIONGAP  9  6  6   IZABELLA  8.9  9.0  9.8   GLC  100*  124*  83     Most Recent 6 Bacteria Isolates From Any Culture (See EPIC Reports for Culture Details):  Recent Labs   Lab Test  03/10/18   1600  03/10/18   1200  03/10/18   1043  03/10/18   1040  12/25/17   1655  12/25/17   1646   CULT  No growth after 22 hours  Culture negative < 24 hours, reincubate  No growth after 1 day  No growth after 1 day  No growth  No growth     IMAGING  Transthoracic echocardiogram 3/11/2018  Interpretation Summary  Status post 26mm homograft aortic valve/ aortic root replacement 12/19/17 by  history.  Global LV function is not well-assessed on this study due to suboptimal image  quality, but grossly appears improved compared with 12/20/17.  S/P AVR with aortic valve homograft by history. The homograft appears well-  seated. There is no paravalvular AI. Gradients are suboptimally assessed.  Status post homograft aortic root replacement. Aortic root is normal in size.  Mildly dilated ascending aorta measuring 4.2 cm. There is thickening and  echodense material around the aortic root, likely postoperative. There does  not appear to be communication between this space and the lumen of the aorta.  No pericardial effusion is present.     This study was compared with the study from 12/20/17: LV function has probably  improved. Raisa-aortic thickening appears unchanged.    CT PE 3/10/2018     FINDINGS:  There is no CT evidence for pulmonary embolism or other  acute vascular abnormality of the chest. Vascular calcifications are  seen, including coronary artery calcifications. Prior median  sternotomy is again noted. There is a fluid collection around the  ascending aorta which is mostly anterior and measures up to 3.0 cm  in  thickness. The prior exam showed a similar collection which  communicated with the ascending aorta. The current collection does not  appear to communicate with any of the vascular structures. It  currently may represent an old hematoma. The collection does not have  a significantly enhancing wall but this does not exclude an infected  collection. No other mediastinal or hilar abnormalities. A few  scattered calcified granulomata in the lungs bilaterally. Small amount  of linear scarring in the posterior right lung base. No confluent  infiltrates. Visualized upper abdomen shows a 1.3 cm lucency in the  left lobe of the liver anteriorly which was present on the prior exam  and is most likely a benign cyst.         IMPRESSION:  1. No evidence for pulmonary embolism.  2. Collection of fluid or old hemorrhage around the ascending thoracic  aorta. This does not communicate with the ascending aorta or any other  vascular structure as seen on the previous study from 12/17/2017 and  may represent an old hematoma. An infected collection is not excluded.  3. Old granulomatous disease in lungs bilaterally    Pending Results   These results will be followed up by PCP  Unresulted Labs Ordered in the Past 30 Days of this Admission     Date and Time Order Name Status Description    3/10/2018 1535 Blood culture ONE site Preliminary     3/10/2018 1011 Blood culture Preliminary     3/10/2018 1011 Blood culture Preliminary     3/10/2018 1006 Urine Culture Preliminary              Primary Care Physician   Alfredito Garzon    Discharge Disposition   Discharged to home  Condition at discharge: Stable    Discharge Orders     Reason for your hospital stay   Dear Cricket Miguel    Your were hospitalized at [facility] with left arm pain, mental status and tooth pain and treated with antibiotics.  Today you are ready to be discharged home.  You should plan to follow up with the providers below, but if you  develop fever, shortness of breath, light headedness, chest pain or palpitations please seek medical attention.    Please get the following tests done:  BMP in 7 days    Please set up an appointment with:  -Primary care provider within 7 days for lab draws and follow up on blood cultures  -Cardiologist within 7-14 days for further diuretic management and possible cardiac imaging studies (i.e. Cardiac MRI)  -Oromaxillary surgeon for consideration of tooth extraction    Your hospital unit at the time of discharge is 7C so if you have any questions please call the hospital at [phone] and ask to talk to a nurse on 7C.     Adult [facility] Follow-up and recommended labs and tests   Follow up with primary care provider, Alfredito Garzon, within 7 days for hospital follow- up.  The following labs/tests are recommended: BMP.    Follow up with cardiology for diuretic management and consideration of cardiac MRI    Follow up with Oromaxillary surgeon regarding tooth extraction      Appointments on [facility]or [facility] (with [facility] or [facility] provider or service). Call [phone] if you haven't heard regarding these appointments within 7 days of discharge.     Follow Up and recommended labs and tests   Follow up with primary care provider, Alfredito Garzon, within 7 days for hospital follow- up.  The following labs/tests are recommended: BMP.    Follow up with cardiology for diuretic management and consideration of cardiac MRI    Follow up with Oromaxillary surgeon regarding tooth extraction      Appointments on [facility]/or [facility] (with [facility] or [facility] provider or service). Call [phone] if you haven't heard regarding these appointments within 7 days of discharge.     Activity   Your activity upon discharge: activity as tolerated     DNR/DNI     Diet   Follow this diet upon discharge: Orders Placed This Encounter     Low Saturated Fat Na <2400 mg       Discharge Medications   Discharge  Medication List as of 3/11/2018  4:59 PM      CONTINUE these medications which have NOT CHANGED    Details   bumetanide (BUMEX) 1 MG tablet Take 1 mg by mouth daily, Historical      Ferrous Sulfate (IRON) 325 (65 FE) MG tablet Take 1 tablet by mouth every evening, Historical      bisacodyl (DULCOLAX) 5 MG EC tablet Take 5 mg by mouth 2 times daily as needed for constipation, Historical      polyethylene glycol (MIRALAX/GLYCOLAX) powder Take 17 g by mouth every evening as needed for constipation, Historical      losartan (COZAAR) 50 MG tablet TAKE 1/2 TABLET BY MOUTH EVERY 12 HOURS, Disp-90 tablet, R-3, E-Prescribe      order for DME Equipment being ordered: [facility] BedDisp-1 Device, R-0, Local Print      mirtazapine (REMERON) 7.5 MG TABS tablet Take 3 tablets (22.5 mg) by mouth At Bedtime, Disp-90 tablet, R-3, E-Prescribe      Potassium Chloride ER 20 MEQ TBCR Take 1 tablet (20 mEq) by mouth 2 times daily, Disp-60 tablet, R-3, E-Prescribe      carvedilol (COREG) 12.5 MG tablet Take 1 tablet (12.5 mg) by mouth 2 times daily (with meals), Historical      OLANZapine (ZYPREXA) 2.5 MG tablet Take 1 tablet (2.5 mg) by mouth At Bedtime, Disp-60 tablet, R-0, Historical      aspirin 81 MG chewable tablet 1 tablet (81 mg) by Oral or Feeding Tube route daily, Disp-60 tablet, R-1, Transitional      atorvastatin (LIPITOR) 40 MG tablet Take 1 tablet (40 mg) by mouth daily, Disp-60 tablet, R-1, Transitional      senna-docusate (SENOKOT-S;PERICOLACE) 8.6-50 MG per tablet Take 1-2 tablets by mouth 2 times daily as needed for constipation, Disp-100 tablet, R-0, Transitional      multivitamin, therapeutic with minerals (THERA-VIT-M) TABS tablet Take 1 tablet by mouth daily, Disp-30 each, R-0, Transitional      Carboxymethylcellulose Sod PF (REFRESH PLUS) 0.5 % SOLN ophthalmic solution Place 1 drop Into the left eye 3 times daily as needed (to flush debris from eye), Disp-1 Bottle, R-1, Transitional      famotidine (PEPCID) 20 MG  tablet Take 1 tablet (20 mg) by mouth 2 times daily, Disp-60 tablet, R-1, Transitional           Allergies   Allergies   Allergen Reactions     Lisinopril Swelling     One-sided facial swelling after being on lisinopril/HCTZ for one week.      Internal Medicine Staff Addendum  Date of Service: 3/12/2018  I have seen and examined Mr. Miguel, reviewed the data and discussed the plan of care with the patient, his wife, and the care team on P&FC Rounds.  I agree with the above documentation.  I discussed pt's care with bedside RN, case management/social work today.  I personally reviewed imaging, labs, medications and past 24 hr notes.  Initially transferred to [facility] with concerns that he had a new active cardiac issue, however, with review of imaging and cardiac echo, unlikely an active medical issue.  Hence although initially transferred with expectation of a prolonged hospital stay, the morning after admission, with further imaging and trend of clinical course, he was able to be discharged with outpatient follow up.    40 minutes spent in discharge, including >50% in counseling and coordination of care, medication review and plan of care recommended on follow up. Questions were answered.   Dr. Garzon  (primary cardiologist) was contacted electronically at the time of discharge, so as to bridge from hospital to outpatient care.   It was our pleasure to care for Mr. Miguel during his hospitalization. Please do not hesitate to contact me should there be questions regarding the hospital course or discharge plan.      Adiel Santa MD  Internal Medicine/Pediatrics Hospitalist & Staff Physician   of Internal Medicine and Pediatrics  [facility]  Pager: [phone]

## 2018-03-13 NOTE — TELEPHONE ENCOUNTER
Patient requesting an OV with Dr. Dipak Gordillo today 3/13/18, stated he can be worked in. Follow up ER- Sores in mouth.   Please call Cell: [phone]  Thank you  Gisele Kwon

## 2018-03-13 NOTE — TELEPHONE ENCOUNTER
Patient scheduled.    Speech therapy needs to have a Medicare appropriate diagnosis for coverage.  Some possible options include dysarthria R47.1, other speech disorders R47.89 or dysphagia R13.10.  Please order with attached dx or advise if not appropriate.    Marge Heath

## 2018-03-13 NOTE — NURSING NOTE
"Chief Complaint   Patient presents with     Fever     Mouth Problem       Initial /80  Pulse 66  Temp 97.1  F (36.2  C) (Tympanic)  Ht 5' 8\" (1.727 m)  Wt 182 lb (82.6 kg)  SpO2 95%  BMI 27.67 kg/m2 Estimated body mass index is 27.67 kg/(m^2) as calculated from the following:    Height as of this encounter: 5' 8\" (1.727 m).    Weight as of this encounter: 182 lb (82.6 kg).  Medication Reconciliation: complete  "

## 2018-03-13 NOTE — TELEPHONE ENCOUNTER
Please call and find out what is needed, recent CVA should be appropriate, with speech/swallowing issues

## 2018-03-13 NOTE — MR AVS SNAPSHOT
After Visit Summary   3/13/2018    Cricket Miguel    MRN: [id number]           Patient Information     Date Of Birth          1955        Visit Information        Provider Department      3/13/2018 12:20 PM Dipak Gordillo MD [facility]        Today's Diagnoses     Aphthous ulcer of mouth    -  1    Cerebrovascular accident (CVA), unspecified mechanism (H)        Acute bacterial endocarditis        H/O aortic root repair        S/P AVR (aortic valve replacement)        Congestive heart failure, NYHA class 2, unspecified congestive heart failure type (H)        Cardiomyopathy, unspecified type (H)        Aortic root dilatation (H)        COPD, mild (H)        Hypertension goal BP (blood pressure) < 140/90        Hyperlipidemia LDL goal <70        Major depressive disorder, single episode, moderate (HCC)        Anxiety        Tobacco use disorder        Sensorineural hearing loss (SNHL) of both ears        Medication monitoring encounter           Follow-ups after your visit        Additional Services     NEUROLOGY ADULT REFERRAL       Your provider has referred you for the following:   Consult at [facility]: Neurology Clinic [facility] [phone]   [email or web address]  Stroke/Endovascular    Please be aware that coverage of these services is subject to the terms and limitations of your health insurance plan.  Call member services at your health plan with any benefit or coverage questions.      Please bring the following with you to your appointment:    (1) Any X-Rays, CTs or MRIs which have been performed.  Contact the facility where they were done to arrange for  prior to your scheduled appointment.    (2) List of current medications  (3) This referral request   (4) Any documents/labs given to you for this referral                  Your next 10 appointments already scheduled     Mar 28, 2018  1:45 PM CDT   Neuro Treatment with Chelsey Padilla  SLP   [place] [facility] ([facility])    [address] [facility] [address]   [phone]            Apr 02, 2018 11:00 AM CDT   [facility]    [address] [facility] [address]   [phone]            Apr 04, 2018  3:00 PM CDT   [facility]    [address] [facility] [address]   [phone]            Apr 05, 2018  1:45 PM CDT   Return Visit with Alfredito Garzon MD   [facility] ([facility])    [address]  [facility] [address]   [phone]            Apr 09, 2018 11:00 AM CDT   Neuro Treatment with HOLLY Cristobal   [facility] ([facility])    [address] [facility] [address]   [phone]            Apr 11, 2018  1:45 PM CDT   Neuro Treatment with HOLLY Cristobal   [facility] ([facility])    [address] [facility] [address]   [phone]            Apr 12, 2018  3:00 PM CDT   Neuro Treatment with HOLLY Bledsoe   [facility] ([facility])    [address] [facility] [address]   [phone]            Apr 18, 2018  3:45 PM CDT   Neuro Treatment with HOLLY Bledsoe   [facility] ([facility])    [address] [address]   [phone]            Apr 19, 2018 11:30 AM CDT   Neuro Treatment with HOLLY Bledsoe   [facility] ([facility])    [address] [facility] [address]   [phone]            Apr 23, 2018 11:00 AM CDT   Neuro Treatment with HOLLY Cristobal [place] [facility] ([facility])  "   [address]  [facility] [address] [phone]              Who to contact     If you have questions or need follow up information about today's clinic visit or your schedule please contact [facility] directly at [phone].  Normal or non-critical lab and imaging results will be communicated to you by MyChart, letter or phone within 4 business days after the clinic has received the results. If you do not hear from us within 7 days, please contact the clinic through [employer] or phone. If you have a critical or abnormal lab result, we will notify you by phone as soon as possible.  Submit refill requests through [employer] or call your pharmacy and they will forward the refill request to us. Please allow 3 business days for your refill to be completed.          Additional Information About Your Visit        [employer] Information     [employer] gives you secure access to your electronic health record. If you see a primary care provider, you can also send messages to your care team and make appointments. If you have questions, please call your primary care clinic.  If you do not have a primary care provider, please call [phone] and they will assist you.        Care EveryWhere ID     This is your Care EveryWhere ID. This could be used by other organizations to access your [place] medical records  [id number]        Your Vitals Were     Pulse Temperature Height Pulse Oximetry BMI (Body Mass Index)       66 97.1  F (36.2  C) (Tympanic) 5' 8\" (1.727 m) 95% 27.67 kg/m2        Blood Pressure from Last 3 Encounters:   03/19/18 (!) 123/93   03/13/18 100/80   03/11/18 106/70    Weight from Last 3 Encounters:   03/13/18 182 lb (82.6 kg)   03/10/18 182 lb 15.7 oz (83 kg)   02/19/18 179 lb (81.2 kg)              We Performed the Following     CBC with platelets     Comprehensive metabolic panel     NEUROLOGY ADULT REFERRAL          Today's Medication Changes          These changes are accurate as " of 3/13/18 11:59 PM.  If you have any questions, ask your nurse or doctor.               Start taking these medicines.        Dose/Directions    magic mouthwash suspension   Commonly known as:  ENTER INGREDIENTS IN COMMENTS   Used for:  Aphthous ulcer of mouth   Started by:  Dipak Gordillo MD        Dose:  5-10 mL   Take 5-10 mLs by mouth every 4 hours as needed   Quantity:  240 mL   Refills:  1            Where to get your medicines      Some of these will need a paper prescription and others can be bought over the counter.  Ask your nurse if you have questions.     Bring a paper prescription for each of these medications     magic mouthwash suspension                Primary Care Provider Office Phone # Fax #    Alfredito Paresh Garzon [phone] [address]  [place] [address]        Equal Access to Services     DANIEL BILLS AH: Hadii jenise nova hadasho Soomaali, waaxda luqadaha, qaybta kaalmada adeegyada, waxjuan hobson hayradha rodríguez . So [facility] [phone].    ATENCIÓN: Si habla español, tiene a mendiola disposición servicios gratuitos de asistencia lingüística. [facility] [phone].    We comply with applicable federal civil rights laws and [place] laws. We do not discriminate on the basis of race, color, national origin, age, disability, sex, sexual orientation, or gender identity.            Thank you!     Thank you for choosing [facility]  for your care. Our goal is always to provide you with excellent care. Hearing back from our patients is one way we can continue to improve our services. Please take a few minutes to complete the written survey that you may receive in the mail after your visit with us. Thank you!             Your Updated Medication List - Protect others around you: Learn how to safely use, store and throw away your medicines at www.disposemymeds.org.          This list is accurate as of 3/13/18 11:59 PM.  Always use your most recent med list.                    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, Cerebrovascular accident (CVA) due to other mechanism (H)       atorvastatin 40 MG tablet    LIPITOR    60 tablet    Take 1 tablet (40 mg) by mouth daily    Cerebrovascular accident (CVA) due to other mechanism (H)       bisacodyl 5 MG EC tablet    DULCOLAX     Take 5 mg by mouth 2 times daily as needed for constipation        Carboxymethylcellulose Sod PF 0.5 % Soln ophthalmic solution    REFRESH PLUS    1 Bottle    Place 1 drop Into the left eye 3 times daily as needed (to flush debris from eye)    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       famotidine 20 MG tablet    PEPCID    60 tablet    Take 1 tablet (20 mg) by mouth 2 times daily    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       iron 325 (65 FE) MG tablet      Take 1 tablet by mouth every evening        losartan 50 MG tablet    COZAAR    90 tablet    TAKE 1/2 TABLET BY MOUTH EVERY 12 HOURS    Hypertension goal BP (blood pressure) < 140/90, Congestive heart failure, NYHA class 2, unspecified congestive heart failure type (H), Cardiomyopathy, unspecified type (H)       magic mouthwash suspension    ENTER INGREDIENTS IN COMMENTS    240 mL    Take 5-10 mLs by mouth every 4 hours as needed    Aphthous ulcer of mouth       mirtazapine 7.5 MG Tabs tablet    REMERON    90 tablet    Take 3 tablets (22.5 mg) by mouth At Bedtime    Mild single current episode of major depressive disorder (H), Anxiety       multivitamin, therapeutic with minerals Tabs tablet     30 each    Take 1 tablet by mouth daily    Cerebrovascular accident (CVA) due to other mechanism (H)       order for DME     1 Device    Equipment being ordered: Hospital Bed    Cerebrovascular accident  (CVA), unspecified mechanism (H), Chronic systolic heart failure (H), Congestive heart failure, NYHA class 2, unspecified congestive heart failure type (H), COPD, mild (H), Acute bacterial endocarditis, S/P AVR (aortic valve replacement), H/O aortic root repair       polyethylene glycol powder    MIRALAX/GLYCOLAX     Take 17 g by mouth every evening as needed for constipation        Potassium Chloride ER 20 MEQ Tbcr     60 tablet    Take 1 tablet (20 mEq) by mouth 2 times daily    Hypokalemia       senna-docusate 8.6-50 MG per tablet    SENOKOT-S;PERICOLACE    100 tablet    Take 1-2 tablets by mouth 2 times daily as needed for constipation    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

## 2018-03-13 NOTE — PROGRESS NOTES
SUBJECTIVE:   Cricket Miguel is a 62 year old male who presents to clinic today for the following health issues:    3/13/18    Concern - fever/ mouth sores  Onset: on and off x 6 days    Description:   mouth    Intensity: mild to moderate    Progression of Symptoms:  worsening    Accompanying Signs & Symptoms:  Fever on and off- swelling on left side of face    Previous history of similar problem:       Precipitating factors:   Worsened by: none    Alleviating factors:  Improved by: pt was given an abx which helped    No f/c/s - no sinus, no uri sx.    Therapies Tried and outcome: lnone    Extensive recent medical/surgical history since 10/06/17, endocarditis with CVA and cardiac surgery.  Reviewed current cares and rehab.    2/19 Note --   Recent Hospitalization -- Reviewed documentation per Cardiology (Elissa Garzon) regarding recent Endocarditis and Redo sternotomy, femoral arterial and venous cannulation for cardiopulmonary bypass, aortic root and aortic valve replacement procedures at [facility]. After surgery, he was discharged to [facility].      At time of discharge from [facility] (Chart review 2/14), Cricket had finished his antibiotics and endocarditis was resolved. His GT was removed 1/19.      He stated that his right arm and leg strength and function are improving, and he is able to stand on his own at this point - undergoing PT.      Cricket stated that he doesn't taste much food yet, and his dentures aren't fitting properly, swallowing ok.      Cricket's caretaker expressed concerns about lethargy - his hgb was a bit low upon last CBC.      He is planning to follow up with ENT next week to help with congestion sx.      He has aides in place at home to help him, but things are still difficult -- looking for other options for a place to live, house has multiple levels. He is also wanting to be able to use his motor home again - would need to modify.      Caretaker expressed concerns  about Cricket not taking potassium as directed -- he has been taking Bumex.            Potassium   Date Value Ref Range Status   02/19/2018 4.1 3.4 - 5.3 mmol/L Final      TCU Note 1/8/18 --   Hospital Course: Cricket Miguel is a 62 year old male with hx of COPD, HTN, HLD, CAD, heart failure, aortic stenosis and ascending aortic aneurysm s/p aortic valve replacement and aortic root replacement (April 2016) c/b moises-aortic abscess, endocarditis, cerebral septic emboli and severe AI s/p redo-sternotomy aortic root and valve homograft 12/19. Post op EF 20-25%.        PLAN: Neuro/ pain/ sedation: Hx of multifocal acute infarctions involving the left parietotemporal lobes, left cerebral hemisphere and right occipital lobe presumed to be septic emboli. History of depression and anxiety. Had some delirium post operatively. Patient was followed by both psychiatry and neurology post operatively. His delirium has resolved and is mental status has significantly improved. He is alert and oriented x 3. His daughter Eloy is his medical decision maker as psychiatry does not believe he has the capacity at this time to be his own decision maker. His partner Meghna is very involed in his care and is his caregiver at home. He has been stable on his current regimen of zyprexa 2.5 mg bid, it was decreased 1/1/18 given increased flat affect and delayed responses. This has since resolved and he is doing better with both anxiety and affect. He is also on mirtazapine at night for sleep and PRN Lorazepam for anxiety. Neurology recommended fluoxetine for post-stroke recovery, however, Psych wanted to hold off on any changes to psych medications just prior to discharge. They also recommend against fluoxetine or Effexor at this time due to patient's long Qtc interval, 479 ms on 12/27. Patient did take Effexor prior to surgery for his depression. Should his depression become an issue psychiatry recommends starting Lexapro, as it has very few  "drug-drug interactions. Per neurology note on 12/29/17, they believe he should have continued functional improvement to some degree.      ED Note 12/16/17 --   Medical Decision Making: Cricket Miguel is a 62 year old male with a PMH of aortic stenosis with ascending aortic aneurysm/CAD s/p aortic valve replacement and aortic root replacement with composite graft (April 2016), HFpEF, COPD, HTN, HLD who presents for evaluation of shortness of breath.  I considered a broad differential of their dyspnea including CHF exacerbation, PE, dissection, hemothorax, pleural effusion, pneumonia, acute coronary syndrome, reactive airway disease, bronchitis, upper airway obstruction, foreign body, etc.  Given the history and exam plus laboratory findings I feel congestive heart failure is the most likely etiology and would not do extensive workup for other causes as mentioned above at this time.  Troponin was elevated as discussed above; again, cardiology feels this is likely to CHF and did not have any specific recommendations for this at time, and patient is a symptomatic without any chest pain, dyspnea, dizziness.  The patient received IV diuretics in ED and remained stable; will start I/O's here in ED.  Will transfer to the [facility] at this time for further cares.  Patient and his family are comfortable with plan.     12/15/17 OV Note --   Hx of CVA/Endocarditis -- Occurred 10/6/17 after abscess to heart, hospitalized (Ali), transitional care followed on 11/4/17, sent home on 12/1/17 with reports of \"good recovery\" per hospital -- now undergoing PT/speech therapy and home care to help with speech, etc - this is going well. Discussed plans for surgery (Soumya at [facility]) in 1/18, with IV abx until that time. Followed by cardiology (Ryann).      See extensive DC summary from [place] - reviewed in total with patient and significant other - Meghna      He has been able to swallow/chew with minimal issues, but \"nothing " "tastes good\" - will be seen by Dentistry for assistance with dentures. Feeding tube in place for medications. BM's have not been good - liquid and irregular - Miralax, lactobacillus, imodium A-D.       Leg strength improving bilaterally, left arm strength is \"okay\" but he continues to have issues with right arm strength and use - overall left side better than left.       Eye sx (issues closing eye) were assessed by Opthalmology and is being treated with drops, successfully - will follow up after surgeries.       Reviewed documentation - followed by Infections Disease (Saleem at [facility], [facility] at [facility]).       COPD -- Giovanni complained of dyspnea issues when he lays down to bed - sleeping on an incline. Nebulizer available which helps - tracheotomy tube sometime around 11/25 while in hospital.       HTN -- Labetalol 100 mg every 8 hours, Amlodipine 5 mg daily, HCTZ 12.5 mg daily. Monitored by Homecare Nurse - good numbers.             BP Readings from Last 3 Encounters:   12/27/17 111/90   12/16/17 128/79   12/15/17 (!) 126/94       Anxiety/Depression/Agitation/Sleep -- Abilify 6 mg daily, Seroquel 25 mg 4 times daily via oral or feeding tube, Remeron 22.5 mg daily, Effexor 100 mg twice daily. Giovanni reported that he sleeps \"well\", but his SO stated that he often wakes up and is restless. Giovanni reported that he often gets frustrated.       Pain -- Roxicodone 5-10 mg prn every 4 hours, Tylenol 650 mg via feeding tube every 6 hours.        Lipids -- Lipitor 40 mg daily, Aspirin 81 mg daily.        Problem list and histories reviewed & adjusted, as indicated.  Additional history: as documented    Reviewed and updated as needed this visit by clinical staff  Tobacco  Allergies  Meds  Med Hx  Surg Hx  Fam Hx  Soc Hx      Reviewed and updated as needed this visit by Provider       BP Readings from Last 3 Encounters:   03/19/18 (!) 123/93   03/13/18 100/80   03/11/18 106/70       Wt Readings from Last 4 Encounters: "   03/13/18 182 lb (82.6 kg)   03/10/18 182 lb 15.7 oz (83 kg)   02/19/18 179 lb (81.2 kg)   02/13/18 175 lb 14.4 oz (79.8 kg)       Health Maintenance    Health Maintenance Due   Topic Date Due     HF ACTION PLAN Q3 YR  1955     WELLNESS VISIT Q1 YR  1955     COPD ACTION PLAN Q1 YR  1955     FIT Q1 YR  03/16/1963     HEPATITIS C SCREENING  03/16/1971     MICROALBUMIN Q1 YEAR  02/07/2013     DEPRESSION ACTION PLAN Q1 YR  05/06/2014     PHQ-9 Q6 MONTHS  12/03/2016     LIPID MONITORING Q1 YEAR  04/03/2017     PSA Q1 YR  11/30/2017     FALL RISK ASSESSMENT  03/16/2018       Current Problem List    Patient Active Problem List   Diagnosis     Tobacco use disorder     Hypertension goal BP (blood pressure) < 140/90     Disease of lung     Major depressive disorder, single episode, moderate (HCC)     Advance Care Planning     Psoriasis     COPD, mild (H)     CHF (congestive heart failure), NYHA class II (H)     AR (aortic regurgitation)     AI (aortic insufficiency)     Aortic root dilatation (H)     Hyperlipidemia LDL goal <70     Health Care Home     Status post coronary angiogram     Cardiomyopathy (H)     S/P AVR (aortic valve replacement)     H/O aortic root repair     Anemia     Endocarditis     Encephalopathy     Abscess of aortic root     CVA (cerebral vascular accident) (H)     Depression     Anxiety     Chronic systolic heart failure (H)     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     SNHL (sensorineural hearing loss)     Hx of aortic root repair     Prophylactic antibiotic       Past Medical History    Past Medical History:   Diagnosis Date     Abscess of aortic root 09/2017     AI (aortic insufficiency)     severe     Anxiety      Aortic root dilatation (H)      AR (aortic regurgitation)     severe     Cardiomyopathy (H)      CHF (congestive heart failure), NYHA class II (H)      COPD, mild (H)     spirometry 12/16     CVA (cerebral vascular accident) (H) 09/2017     septic emboli - MSSA - cerebellum, Left MCA, Rt Occipital, Aphasia, Left facial paralysis, Right UE/LE weakness, Left visual field cut, moderate to severe encephalopathy     Depression 09/2017     Heart murmur      Hyperlipidemia LDL goal <70 10/31/2010     Hypertension goal BP (blood pressure) < 140/90      Inguinal hernia      left lung nodule  1/29/2008     Major depressive disorder, single episode, moderate (H)      Psoriasis childhood     SNHL (sensorineural hearing loss)     Left, secondary to CVA     Tobacco use disorder        Past Surgical History    Past Surgical History:   Procedure Laterality Date     ARTHROSCOPY KNEE INCISION AND DRAINAGE Left 10/8/2017    Arthroscopic Incision and Drainage of Left Knee - Dr Munoz     HC SHOULDER ARTHROSCOPY, DX  2001    Arthroscopy, Shoulder RT Dr OSIRIS Andersen     HC SHOULDER ARTHROSCOPY, DX  1/04    LT arthroscopy Dr OSIRIS Andersen     HERNIA REPAIR, UMBILICAL  1/08    incarcerated - dr rockwell     HERNIORRHAPHY INGUINAL  12/21/2012    HERNIORRHAPHY INGUINAL;  Right Inguinal Hernia Repair with mesh ;  Surgeon: Mello Rockwell MD;  Location: [facility] OR     REPAIR ANEURYSM ASCENDING AORTA N/A 12/19/2017    REPAIR ANEURYSM ASCENDING AORTA;  REDO Median STERNOTOMY, FEMORAL CANNULATION, Lysis of adhesions, AORTIC ROOT VALVE HOMOGRAFT with 26mm x 7.0cm Cryolife Aortic valve and conduit - Dr Bowers     REPLACE VALVE AORTIC N/A 5/17/2016    REPLACE VALVE AORTIC - Dr Bowers     ROTATOR CUFF REPAIR RT/LT  11/10    Rt arthroscopy - Dr OSIRIS Andersen     SURGICAL HISTORY OF -   5/16    AVR, severe AR, aortic root repair     TRACHEOSTOMY PERCUTANEOUS  10/27/2017            Current Medications    Current Outpatient Prescriptions   Medication Sig Dispense Refill     magic mouthwash (ENTER INGREDIENTS IN COMMENTS) suspension Take 5-10 mLs by mouth every 4 hours as needed 240 mL 1     Ferrous Sulfate (IRON) 325 (65 FE) MG tablet Take 1 tablet by mouth every evening       bisacodyl (DULCOLAX)  5 MG EC tablet Take 5 mg by mouth 2 times daily as needed for constipation       losartan (COZAAR) 50 MG tablet TAKE 1/2 TABLET BY MOUTH EVERY 12 HOURS 90 tablet 3     mirtazapine (REMERON) 7.5 MG TABS tablet Take 3 tablets (22.5 mg) by mouth At Bedtime 90 tablet 3     Potassium Chloride ER 20 MEQ TBCR Take 1 tablet (20 mEq) by mouth 2 times daily 60 tablet 3     aspirin 81 MG chewable tablet 1 tablet (81 mg) by Oral or Feeding Tube route daily 60 tablet 1     atorvastatin (LIPITOR) 40 MG tablet Take 1 tablet (40 mg) by mouth daily 60 tablet 1     multivitamin, therapeutic with minerals (THERA-VIT-M) TABS tablet Take 1 tablet by mouth daily 30 each 0     Carboxymethylcellulose Sod PF (REFRESH PLUS) 0.5 % SOLN ophthalmic solution Place 1 drop Into the left eye 3 times daily as needed (to flush debris from eye) 1 Bottle 1     famotidine (PEPCID) 20 MG tablet Take 1 tablet (20 mg) by mouth 2 times daily 60 tablet 1     carvedilol (COREG) 12.5 MG tablet TAKE 1 TABLET BY MOUTH TWICE DAILY 180 tablet 1     OLANZapine (ZYPREXA) 2.5 MG tablet TAKE 1 TABLET BY MOUTH EVERY DAY 90 tablet 1     bumetanide (BUMEX) 1 MG tablet TAKE 1 TABLET BY MOUTH DAILY 90 tablet 1     polyethylene glycol (MIRALAX/GLYCOLAX) powder Take 17 g by mouth every evening as needed for constipation       order for DME Equipment being ordered: Hospital Bed 1 Device 0     senna-docusate (SENOKOT-S;PERICOLACE) 8.6-50 MG per tablet Take 1-2 tablets by mouth 2 times daily as needed for constipation 100 tablet 0       Allergies    Allergies   Allergen Reactions     Lisinopril Swelling     One-sided facial swelling after being on lisinopril/HCTZ for one week.        Immunizations    Immunization History   Administered Date(s) Administered     Influenza Vaccine IM 3yrs+ 4 Valent IIV4 10/28/2017     Pneumococcal 23 valent 11/04/2017     TD (ADULT, 7+) 04/14/1999       Family History    Family History   Problem Relation Age of Onset     Genetic Disorder Mother   "    Bone plateover growth Agustin. shoulder surgeries     CANCER Mother      brain cancer     Alzheimer Disease Father        Social History    Social History     Social History     Marital status:      Spouse name: N/A     Number of children: 3     Years of education: 12     Occupational History     Not on file.     Social History Main Topics     Smoking status: Former Smoker     Packs/day: 1.00     Years: 35.00     Quit date: 4/1/2016     Smokeless tobacco: Never Used      Comment: 4/2016     Alcohol use 0.0 oz/week     0 Standard drinks or equivalent per week      Comment: once monthly     Drug use: Yes      Comment: marijuana- daily previously     Sexual activity: Yes     Partners: Female     Other Topics Concern     Caffeine Concern Yes     3 cups     Sleep Concern No     Special Diet No     trying to watch salt     Exercise Yes     walking     Seat Belt No     Parent/Sibling W/ Cabg, Mi Or Angioplasty Before 65f 55m? No     Social History Narrative       All above reviewed and updated, all stable unless otherwise noted    Recent labs reviewed    ROS:    As above, otherwise no change    OBJECTIVE:                                                    /80  Pulse 66  Temp 97.1  F (36.2  C) (Tympanic)  Ht 5' 8\" (1.727 m)  Wt 182 lb (82.6 kg)  SpO2 95%  BMI 27.67 kg/m2  Body mass index is 27.67 kg/(m^2).  GENERAL: healthy, alert and no distress  EYES: Eyes grossly normal to inspection, extraocular movements - intact, and PERRL  HENT: ear canals- normal; TMs- normal; Nose- normal; Mouth- few aphthous ulcers, no lesions  NECK: no tenderness, no adenopathy, no asymmetry, no masses, no stiffness; thyroid- normal to palpation  RESP: lungs clear to auscultation - no rales, no rhonchi, no wheezes  CV: regular rates and rhythm, normal S1 S2, no S3 or S4 and no murmur, no click or rub -  ABDOMEN: soft, no tenderness, no  hepatosplenomegaly, no masses, normal bowel sounds  MS: extremities- no gross deformities " noted, no edema  SKIN: no suspicious lesions, no rashes  NEURO: no change, left sided facial droop, slurred speech, right sided weakness  BACK: no CVA tenderness, no paralumbar tenderness  PSYCH: at baseline, frustration and irritability at times.    DIAGNOSTICS/PROCEDURES:                                                      reviewed     ASSESSMENT/PLAN:                                                        ICD-10-CM    1. Aphthous ulcer of mouth K12.0 magic mouthwash (ENTER INGREDIENTS IN COMMENTS) suspension   2. Cerebrovascular accident (CVA), unspecified mechanism (H) I63.9 NEUROLOGY ADULT REFERRAL     Comprehensive metabolic panel     CBC with platelets   3. Acute bacterial endocarditis I33.0    4. H/O aortic root repair Z98.890    5. S/P AVR (aortic valve replacement) Z95.2    6. Congestive heart failure, NYHA class 2, unspecified congestive heart failure type (H) I50.9    7. Cardiomyopathy, unspecified type (H) I42.9    8. Aortic root dilatation (H) I77.810    9. COPD, mild (H) J44.9    10. Hypertension goal BP (blood pressure) < 140/90 I10    11. Hyperlipidemia LDL goal <70 E78.5    12. Major depressive disorder, single episode, moderate (HCC) F32.1    13. Anxiety F41.9    14. Tobacco use disorder F17.200    15. Sensorineural hearing loss (SNHL) of both ears H90.3    16. Medication monitoring encounter Z51.81        Discussed treatment/modality options, including risk and benefits, he desires neurology follow up, labs, magic mouthwash, close follow up, continue extensive rehab through home care. All diagnosis above reviewed and noted above, otherwise stable.  See [facility] orders for further details.  Follow up in 1 month(s) and as needed.    Health Maintenance Due   Topic Date Due     HF ACTION PLAN Q3 YR  1955     WELLNESS VISIT Q1 YR  1955     COPD ACTION PLAN Q1 YR  1955     FIT Q1 YR  03/16/1963     HEPATITIS C SCREENING  03/16/1971     MICROALBUMIN Q1 YEAR  02/07/2013     DEPRESSION  ACTION PLAN Q1 YR  05/06/2014     PHQ-9 Q6 MONTHS  12/03/2016     LIPID MONITORING Q1 YEAR  04/03/2017     PSA Q1 YR  11/30/2017     FALL RISK ASSESSMENT  03/16/2018       See Patient Instructions           Dipak Gordillo MD [address]  [id number] [phone] Fax

## 2018-03-14 NOTE — TELEPHONE ENCOUNTER
Pt admitted to hospital 3/10/18- 3/11/18 for chest pain- fluid collection around ascending thoracic aorta.   Echo done on 3/10/18. Pts wife wondering if patient needs the echo that is scheduled for 4/2/18. Pt will be at OV with Dr. Garzon 4/5/18.     Pts wife also stated that the dentist thinks his tooth is high risk and he should get it extracted. Per AHA guidelines patient is to have 2g Amoxicillin 30-60 minutes prior to procedure. Pts wife wanted to make sure Dr. Garzon did not want any additional antibiotics before or after the procedure since patient is only 3 months post AVR.       Will route to Dr. Garzon to see if patient still need echo on 4/2/18 and if he would like an additional antibiotics for tooth extraction.

## 2018-03-14 NOTE — TELEPHONE ENCOUNTER
Pt scheduled for IV infusion at 10 am 3/19/18 and tooth extraction scheduled for 2:15 pm same day. Pts significant other updated. No further questions or concerns.

## 2018-03-14 NOTE — TELEPHONE ENCOUNTER
Reviewed note below with Dr. Garzon.   He stated that we do not need to keep the echo on 4/2/18. Echo cancelled.     Dr. Garzon would like patient to get IV antibiotics prior to his appointment. 2,000 mg vancomycin IV and 2,000 mg ampicillin at the  [facility]. Orders faxed to [facility].     Spoke to patients significant other, Meghna, and informed her of Dr. Garzon's recommendations above. Meghna stated that she was going to call and make appointment for the tooth extraction and then we can schedule to IV antibiotics. She will call team 4 back with date and time.     Writer will call [facility] to schedule appointment once patients significant other calls back with dentist time and date.       [facility] [phone] (F), [phone] (P)

## 2018-03-15 PROBLEM — Z79.2 PROPHYLACTIC ANTIBIOTIC: Status: ACTIVE | Noted: 2018-01-01

## 2018-03-15 NOTE — TELEPHONE ENCOUNTER
Per MD RADHA: please call patient - see how mouth sores are doing. If they are doing better, can cancel appt for tomorrow    Called patient @ [phone]  Spouse stated that the mouth sores are doing much better - appointment tomorrow will most likely be canceled tomorrow, but spouse is going to call the clinic in the morning to see if the result from the blood cultures are in. If they aren't, they will cancel appointment.     Joycelyn Jones RN  [facility]

## 2018-03-16 NOTE — TELEPHONE ENCOUNTER
Called this am to check on the pt.  Noted that the pt has some pain in their right ankle and they are headed to therapy this am (speech). Pt was dangling their ankle off the bed all night and has some tingling.      No swelling or redness. This is the pts weak side from the stroke. No other concerns. They will call with any further concerns.      Cancelled pts appt today after huddle with TS. Wife agreed with plan.     Florence Castro RN  [facility]

## 2018-03-16 NOTE — TELEPHONE ENCOUNTER
"Requested Prescriptions   Pending Prescriptions Disp Refills     carvedilol (COREG) 12.5 MG tablet [Pharmacy Med Name: CARVEDILOL 12.5MG TABLETS] 60 tablet 0        Last Written Prescription Date:  1.23.18  Last Fill Quantity: na,  # refills: na   Last office visit: 3/13/2018 with prescribing provider:     Future Office Visit:   Next 5 appointments (look out 90 days)     Apr 05, 2018  1:45 PM CDT   Return Visit with Alfredito Garzon MD   [facility] ([facility])    [id number] [place], [place] [id number] [phone]                  Sig: TAKE 1 TABLET BY MOUTH TWICE DAILY    Beta-Blockers Protocol Passed    3/16/2018 10:39 AM       Passed - Blood pressure under 140/90 in past 12 months    BP Readings from Last 3 Encounters:   03/13/18 100/80   03/11/18 106/70   03/10/18 119/75                Passed - Patient is age 6 or older       Passed - Recent (12 mo) or future (30 days) visit within the authorizing provider's specialty    Patient had office visit in the last 12 months or has a visit in the next 30 days with authorizing provider or within the authorizing provider's specialty.  See \"Patient Info\" tab in inbasket, or \"Choose Columns\" in Meds & Orders section of the refill encounter.            OLANZapine (ZYPREXA) 2.5 MG tablet [Pharmacy Med Name: OLANZAPINE 2.5MG TABLETS] 30 tablet 0        Last Written Prescription Date:  1.12.18  Last Fill Quantity: 60,  # refills: 0   Last office visit: 3/13/2018 with prescribing provider:     Future Office Visit:   Next 5 appointments (look out 90 days)     Apr 05, 2018  1:45 PM CDT   Return Visit with Alfredito Garzon MD   [facility] ([facility])    [address]  [facility] [id number] [phone]                  Sig: TAKE 1 TABLET BY MOUTH EVERY DAY    Antipsychotic Medications Failed    3/16/2018 10:39 AM       Failed - Lipid " "panel on file within the past 12 months    Recent Labs   Lab Test  10/13/17   0357 04/03/16 02/07/12   1214   CHOL   --   105*  198   TRIG  265*  120  138   HDL   --   25  47   LDL   --   56  123   VLDL   --    --   28   CHOLHDLRATIO   --    --   4.2              Passed - Blood pressure under 140/90 in past 12 months    BP Readings from Last 3 Encounters:   03/13/18 100/80   03/11/18 106/70   03/10/18 119/75                Passed - Patient is 12 years of age or older       Passed - CBC on file in past 12 months    Recent Labs   Lab Test  03/13/18   1324   WBC  9.1   RBC  4.50   HGB  13.6   HCT  42.8   PLT  352            Passed - Heart Rate on file within past 12 months    Pulse Readings from Last 3 Encounters:   03/13/18 66   02/19/18 68   02/13/18 69              Passed - A1c or Glucose on file in past 12 months    Recent Labs   Lab Test  03/13/18   1324   12/19/17   1806   GLC  105*   < >  142*   A1C   --    --   5.3    < > = values in this interval not displayed.       Please review patients last 3 weights. If a weight gain of >10 lbs exists, you may refill the prescription once after instructing the patient to schedule an appointment within the next 30 days.    Wt Readings from Last 3 Encounters:   03/13/18 182 lb (82.6 kg)   03/10/18 182 lb 15.7 oz (83 kg)   02/19/18 179 lb (81.2 kg)            Passed - Recent (6 mo) or future (30 days) visit within the authorizing provider's specialty    Patient had office visit in the last 6 months or has a visit in the next 30 days with authorizing provider or within the authorizing provider's specialty.  See \"Patient Info\" tab in inbasket, or \"Choose Columns\" in Meds & Orders section of the refill encounter.            bumetanide (BUMEX) tablet 1 mg (Discontinued)  Last Written Prescription Date:  3.11.18  Last Fill Quantity: na,  # refills: na   Last office visit: 3/13/2018 with prescribing provider:     Future Office Visit:   Next 5 appointments (look out 90 days)     " "Apr 05, 2018  1:45 PM CDT   Return Visit with Alfredito Garzon MD   [facility] ([facility])    [address] [address] [phone]                  30 tablet 0     Sig: TAKE 1 TABLET BY MOUTH DAILY    Diuretics (Including Combos) Protocol Passed    3/16/2018 10:39 AM       Passed - Blood pressure under 140/90 in past 12 months    BP Readings from Last 3 Encounters:   03/13/18 100/80   03/11/18 106/70   03/10/18 119/75                Passed - Recent (12 mo) or future (30 days) visit within the authorizing provider's specialty    Patient had office visit in the last 12 months or has a visit in the next 30 days with authorizing provider or within the authorizing provider's specialty.  See \"Patient Info\" tab in inbasket, or \"Choose Columns\" in Meds & Orders section of the refill encounter.           Passed - Patient is age 18 or older       Passed - Normal serum creatinine on file in past 12 months    Recent Labs   Lab Test  03/13/18   1324   CR  1.10             Passed - Normal serum potassium on file in past 12 months    Recent Labs   Lab Test  03/13/18   1324   POTASSIUM  4.3                   Passed - Normal serum sodium on file in past 12 months    Recent Labs   Lab Test  03/13/18   1324   NA  139                "

## 2018-03-19 NOTE — ADDENDUM NOTE
Encounter addended by: Chelsey Padilla, SLP on: 3/19/2018  8:53 AM<BR>     Actions taken: Sign clinical note, Flowsheet accepted

## 2018-03-19 NOTE — PROGRESS NOTES
Infusion Nursing Note:  Cricket Miguel presents today for ampicillin and vancomycin.    Patient seen by provider today: No   present during visit today: Not Applicable.    Note: N/A.    Intravenous Access:  Peripheral IV placed.    Treatment Conditions:  Not Applicable.      Post Infusion Assessment:  Patient tolerated infusion without incident.  Site patent and intact, free from redness, edema or discomfort.  No evidence of extravasations.  Access discontinued per protocol.    Discharge Plan:   Patient and/or family verbalized understanding of discharge instructions and all questions answered.  AVS to patient via MYCHART. Patient discharged in stable condition accompanied by: wife.  Departure Mode: Wheelchair.    Jonna Santo RN

## 2018-03-19 NOTE — PROGRESS NOTES
[facility] OP SPEECH LANGUAGE PATHOLOGY EVALUATION   03/16/18 0800       Present No   General Information   Type of Evaluation Speech and Language;Dysarthria   Type Of Visit Initial   Start Of Care Date 03/16/18   Referring Physician Dr. Duy Quesada MD    Orders Evaluate And Treat   Orders Comment Dysarthria, garbled speech, dysphagia, facial paralisis.    Onset Of Illness/injury Or Date Of Surgery 12/16/17   Precautions/Limitations swallowing precautions   Hearing WFL   Surgical/Medical history reviewed Yes   Pertinent History Of Current Problem Mr. Miguel is a 63 y.o. male with a PMH significant for endocarditis and heart valv dx, CVA, oropharyngeal dysphagia, physical deconditioning, physical deconditioning, congestive heart failure, hypertension, depression, anxiety, chronic obstructive pulmonary dx, and tobacco usage. Pt was initially hospitalized at the [facility] 12/16/2017-1/4/2018, he was then admitted to [facility] in [place] from 1/4/2017-2/16/2018. Pt was d/c'ed home from 2/16/2018-3/10/2018 and was then hospitalized at [facility] a second time from 3/10/2018-3/11/2018 d/t moises-ascending thoracic aorta fluid collection, leukocytosis, root fracture of lower incisor, aphthous ulcer, acute kidney injury, CAD, chronic normocytic anemia, hypertension, hyperlipidemia, GERD, and depression. Pt has been at home and receiving home care services including OT/PT/ST. ST has been addressing L facial weakness/droop resulting in flaccid dysarthria, oropharyngeal dysphagia, and expressive/receptive aphasia. At this time Pt and spouse are present for an OP SLP evaluation to address ongoing concerns with flaccid dysarthria and aphasia. Pt is also scheduled to see OT and PT for OP evaluations.    Current Community Support  Family/friend caregiver  (Spouse-Meghna)   Patient Role/employment History Disabled   Living environment House/[facility]   General Observations Pt seated in wheelchair, significant L  facial droop, difficulty with secretion managment.    Patient/family Goals Improve speech intelligibility and ability to communicate.    Fall Risk Screen   Have you fallen and had an injury in the past year? No   Fall screen comments Pt seated in WC at time of this evaluation, Pt spouse working with scheduling to obtain OP PT evaluation apt.    Pain Assessment   Pain Reported Yes   Pain Location Right Foot/Leg   Pain Scale 9/10   Comments Pt and spouse report pain may be d/t Pt sleeping with leg hanging off bed. Leg is not swollen, Pt took tylenol to manage pain with minimal improvements, though having spouse rub it before coming to apt did help.    Oral Motor Sensory Function   Deficits noted in Labial Function (m-VII, S-V) Protrusion;Decreased tone on the left;Coordination;Seal on the left;Sensation on the left;Sensation on the right   Deficits noted in Lingual Function (m-XII, s-V, VII, IX, XII) Protrusion;Retraction;Lateralization on the left;Lateralization on the right;Anterior elevation;Posterior elevation;Decreased tone on the left;Coordination   Deficits noted in Mandibular Function (m-V) Coordination;Decreased tone on the left;Range of movement   Deficits noted in Velar Function (m-V, X, XI, s-IX) (Unable to visualize d/t reduced jaw ROM/L facial weakness. )   Functional Assessment Scale (Oral Motor) Severe Impairment   Comments SLP: Pt presents with severely impaired oral motor sensory function characterized by impaired lip seal and movement d/t L facial droop, reduced lateral ROM/coordination of movement for lingual function, impaired lingual anterior and posterior elevation, and weak buccal and mandibular function.    Speech   Deficits in Articulation Flaccid dysarthria   Underlying oral motor deficit Yes-significant L facial droop   AIDS-words, % intelligible 50%  (10 word trial)   AIDS-sentences, % intelligible >50%  (4 sentences repeated after SLP d/t vision diff. )   Apraxia Battery:  Sounds (out of  10 possible) 8  (SLP poviding exagerated model for manner/place/voicing. )   Apraxia Battery:  Word Repetition - single syllable (out of 10 possible) 8  (Pt able to produce w/ mild-mod distorition. )   Apraxia Battery:  Increasing word length (out of 10 possible) 9  (Pt able to produce w/ mild-mod distortion. )   Apraxia Battery:  Word Repetition - mulitple syllables (out of 10 possible) 4   Apraxia Battery:  Repeat Sentences (out of 5 possible) 2   Speech Comments SLP: Pt presents with significant impairments in speech intelligiblity. Pt demonstrates increased difficulty for multisyllabic words and phrases. Pt unable to complete sentence length tasks without segmentation into phrases.    Language: Auditory Comprehension (understanding of spoken language)   Tests were administered at the following levels Basic (rote activities);Moderate (routine daily activities)   One Step Commands (out of 10 total) 8   Y/N Simple Questions (out of 10 total) 10   Two Step Commands (out of 5 total) 2   Yes/No Sentence and Simple Paragraph; [place] Diagnostic Aphasia Exam 3 short (out of 6 total) 2   Functional Assessment Scale (Auditory Comprehension) Moderate Impairment   Comments (Auditory Comprehension) SLP: Pt demonstrates moderate impairments at the moderate level of complexity, SLP unable to test complex level d/t time constraints.    Language: Verbal Expression (use of spoken language to express information)   Tests were administered at the following levels Basic (rote activities)   Automatized Sequences; [place] Diagnostic Aphasia Exam (out of 8 total) 0  (Accuracy impacted by both word finding and dysarthria. )   Phrase/Sentence Completion (out of 10 total) 8   Functional Assessment Scale (Verbal Expression) Mild to Moderate Impairment   Comments (Verbal Expression) SLP: Pt demonstrates mild to moderate impairments in V/E, though he and his spouse report this has been gradually improving.    Reading Comprehension  (understanding of written language)   Comments (Reading Comprehension) SLP: Did not assess d/t time constraints and visual impairments.    Written Expression (use of writing to express information)   Comments (Written Expression) SLP: Did not assess d/t time constraints, home care SLP reports Pt demo'ing impairments in W/E though he did not like to work on W/E tasks.    Education Assessment   Barriers to Learning Reading;Language;Visual;Emotional;Physical   Preferred Learning Style Listening;Demonstration;Pictures/video   General Therapy Interventions   Planned Therapy Interventions Language;Communication   Language Auditory comprehension;Reading comprehension;Verbal expression;Written expression   Communication Improve speech intelligibility   Intervention Comments Oral Motor function-VitalStim recommended for L facial droop in coordination with oral motor exercises.    Clinical Impression, SLP Eval   Criteria for Skilled Therapeutic Interventions Met yes;treatment indicated   Functional limitations due to impairments Pt is unable to verbalize wants and needs efficiently,  he is unable to manage oral secretions independently, and is unable to  read and write basic information.    Rehab potential affected by Therapy attendance, therapy participation, carry through with recommended home programming.    Therapy Frequency 5 times;per week  (Pt and spouse able to commit to 3 days/week. )   Predicted Duration of Therapy Intervention (days/wks) 12-24 weeks pending progress.    Risks and Benefits of Treatment have been explained. Yes   Patient, Family & other staff in agreement with plan of care Yes   Clinical Impression Comments Mr. Miguel presents with; Severe oral motor function impairments, moderate-severe flaccid dysarthria, mild to moderate expressive/receptive aphasia. These impairments are characterized by impaired strength, ROM, and coordination of movement for lip, tongue and cheek. As well as delayed processing  and comprehension of verbal and written information, impaired intelligibility, and impaired verbal and written expression due to word finding (aphasia) difficulties. At this time SLP recommends Pt to receive direct skilled intervention 5x/week for 12-24 weeks pending progress to address oral motor function, word finding, written expression, auditory comprehension and reading comprehension.    Language/Cognition Goals   Language/Cognition Goals 1;2;3;4;5;6   Language/Cognition Goal 1   Goal Identifier LTG 2-Mkmurpyb-Cuszvweqfztqjgx    Goal Description Pt will demonstrate 80% accuracy for intelligiblity and word finding during a 5 minute conversation provided 0-min cues in order to communciate with family and spouse.    Target Date 03/16/19   Language/Cognition Goal 2   Goal Identifier STG 2-Oral Motor   Goal Description Pt will complete recommended oral motor exercises targeting lip, tongue, and cheek strength, coordination of movement and ROM provided direct models and 80% accuracy in coordination with neuromuscular stimulation to address facial paralysis.    Target Date 06/13/18   Language/Cognition Goal 3   Goal Identifier STG 3-Language-Verbal Expression   Goal Description Pt will complete basic to moderate level word finding tasks with 80% accuracy provided 0-min cues in order to converse with his spouse during meals.    Target Date 06/13/18   Language/Cognition Goal 4   Goal Identifier STG 5-Wgatzebk-Ekshzdw Expression   Goal Description Pt will complete basic level written expression task (writing CVC words, filling in missing letters) with 80% accuracy provided 0-min cues in order to write checks and complete financial documentation.    Target Date 06/13/18   Language/Cognition Goal 5   Goal Identifier STG 3-Pptennso-Vrttqwn comprehension   Goal Description Pt will complete basic level reading task (matching picture to words) with 80% accuracy in order to read food labels.    Target Date 06/13/18    Language/Cognition Goal 6   Goal Identifier STG 4-Symvgwqj-Dusxiokz Comprehension   Goal Description Pt will complete complex level auditory comprehension tasks provided 0-min cues with 80% accuracy in order to understand information provided by his physicians at [facility].    Target Date 06/13/18   Total Session Time   Total Evaluation Time 56 (+ 8 minutes spent in tx-session ran 5 minutes long)     Thank you for referring Cricket Miguel to outpatient therapy at [facility] [place] in [place].  Please call Chelsey Padilla MA, SLP-CCC at [phone] or email [email or web address] with any questions or concerns.      Chelsey Padilla M.A., SLP-CCC  Speech-Language Pathologist

## 2018-03-19 NOTE — MR AVS SNAPSHOT
After Visit Summary   3/19/2018    Cricket Miguel    MRN: [id number]           Patient Information     Date Of Birth          1955        Visit Information        Provider Department      3/19/2018 10:00 AM  INFUSION CHAIR [facility]        Today's Diagnoses     Prophylactic antibiotic    -  1    Acute bacterial endocarditis           Follow-ups after your visit        Your next 10 appointments already scheduled     Mar 22, 2018  3:00 PM CDT   Neuro Eval with Nessa Mason OT   [facility]ational [facility] ([facility])    [address]  [place] [facility] [address] [phone]            Apr 05, 2018  1:45 PM CDT   Return Visit with Alfredito Garzon MD   [facility] ([facility])    [address]  [place] [address] [phone]              Who to contact     If you have questions or need follow up information about today's clinic visit or your schedule please contact [facility] directly at [phone].  Normal or non-critical lab and imaging results will be communicated to you by [employer], letter or phone within 4 business days after the clinic has received the results. If you do not hear from us within 7 days, please contact the clinic through Simplet or phone. If you have a critical or abnormal lab result, we will notify you by phone as soon as possible.  Submit refill requests through everyArt or call your pharmacy and they will forward the refill request to us. Please allow 3 business days for your refill to be completed.          Additional Information About Your Visit        MyChart Information     everyArt gives you secure access to your electronic health record. If you see a primary care provider, you can also send messages to your care team and make appointments. If you have questions, please call your primary  [facility] clinic.  If you do not have a primary care provider, please call [phone] and they will assist you.        Care EveryWhere ID     This is your Care EveryWhere ID. This could be used by other organizations to access your [facility] medical records  [id number]        Your Vitals Were     Pulse Temperature Respirations             61 97.3  F (36.3  C) (Axillary) 16          Blood Pressure from Last 3 Encounters:   03/19/18 (!) 123/93   03/13/18 100/80   03/11/18 106/70    Weight from Last 3 Encounters:   03/13/18 82.6 kg (182 lb)   03/10/18 83 kg (182 lb 15.7 oz)   02/19/18 81.2 kg (179 lb)              Today, you had the following     No orders found for display       Primary Care Provider Office Phone # Fax #    Alfredito Garzon [phone] [address]  [place] [address]        Equal Access to Services     [facility]: Hadii aad ku hadasho Soomaali, waaxda luqadaha, qaybta kaalmada adeegyada, freddie rodríguez . So [facility] [phone].    ATENCIÓN: Si habla español, tiene a mendiola disposición servicios gratuitos de asistencia lingüística. Llame al [phone].    We comply with applicable federal civil rights laws and [place] laws. We do not discriminate on the basis of race, color, national origin, age, disability, sex, sexual orientation, or gender identity.            Thank you!     Thank you for choosing [facility]  for your care. Our goal is always to provide you with excellent care. Hearing back from our patients is one way we can continue to improve our services. Please take a few minutes to complete the written survey that you may receive in the mail after your visit with us. Thank you!             Your Updated Medication List - Protect others around you: Learn how to safely use, store and throw away your medicines at www.disposemymeds.org.          This list is accurate as of 3/19/18 12:35 PM.  Always use  your most recent med list.                   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, Cerebrovascular accident (CVA) due to other mechanism (H)       atorvastatin 40 MG tablet    LIPITOR    60 tablet    Take 1 tablet (40 mg) by mouth daily    Cerebrovascular accident (CVA) due to other mechanism (H)       bisacodyl 5 MG EC tablet    DULCOLAX     Take 5 mg by mouth 2 times daily as needed for constipation        bumetanide 1 MG tablet    BUMEX     Take 1 mg by mouth daily        Carboxymethylcellulose Sod PF 0.5 % Soln ophthalmic solution    REFRESH PLUS    1 Bottle    Place 1 drop Into the left eye 3 times daily as needed (to flush debris from eye)    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       carvedilol 12.5 MG tablet    COREG     Take 1 tablet (12.5 mg) by mouth 2 times daily (with meals)    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, Hypertension goal BP (blood pressure) < 140/90       famotidine 20 MG tablet    PEPCID    60 tablet    Take 1 tablet (20 mg) by mouth 2 times daily    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       iron 325 (65 FE) MG tablet      Take 1 tablet by mouth every evening        losartan 50 MG tablet    COZAAR    90 tablet    TAKE 1/2 TABLET BY MOUTH EVERY 12 HOURS    Hypertension goal BP (blood pressure) < 140/90, Congestive heart failure, NYHA class 2, unspecified congestive heart failure type (H), Cardiomyopathy, unspecified type (H)       magic mouthwash suspension    ENTER INGREDIENTS IN COMMENTS    240 mL    Take 5-10 mLs by mouth every 4 hours as needed    Aphthous ulcer of mouth       mirtazapine 7.5 MG Tabs tablet    REMERON    90 tablet    Take 3 tablets (22.5 mg) by mouth At Bedtime    Mild single  current episode of major depressive disorder (H), Anxiety       multivitamin, therapeutic with minerals Tabs tablet     30 each    Take 1 tablet by mouth daily    Cerebrovascular accident (CVA) due to other mechanism (H)       OLANZapine 2.5 MG tablet    zyPREXA    60 tablet    Take 1 tablet (2.5 mg) by mouth At Bedtime    Cerebrovascular accident (CVA) due to other mechanism (H), Anxiety, Delirium due to another medical condition       order for DME     1 Device    Equipment being ordered: Hospital Bed    Cerebrovascular accident (CVA), unspecified mechanism (H), Chronic systolic heart failure (H), Congestive heart failure, NYHA class 2, unspecified congestive heart failure type (H), COPD, mild (H), Acute bacterial endocarditis, S/P AVR (aortic valve replacement), H/O aortic root repair       polyethylene glycol powder    MIRALAX/GLYCOLAX     Take 17 g by mouth every evening as needed for constipation        Potassium Chloride ER 20 MEQ Tbcr     60 tablet    Take 1 tablet (20 mEq) by mouth 2 times daily    Hypokalemia       senna-docusate 8.6-50 MG per tablet    SENOKOT-S;PERICOLACE    100 tablet    Take 1-2 tablets by mouth 2 times daily as needed for constipation    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

## 2018-03-20 NOTE — TELEPHONE ENCOUNTER
Completed forms faxed back to  rehab at [phone].   Originals sent to be scanned.     Marge Heath

## 2018-03-20 NOTE — TELEPHONE ENCOUNTER
Huddled with MD Phi - ok to fill please future labs for cholesterol, please fill for 90 days with 1 refill each     Refill per MD PHI direction above       Advised pts SO on the information above she will call back to schedule       Jane Pizarro RN, BSN  [facility]

## 2018-03-20 NOTE — ADDENDUM NOTE
Encounter addended by: Chelsey Padilla, SLP on: 3/20/2018  8:38 AM<BR>     Actions taken: Sign clinical note, Flowsheet accepted

## 2018-03-21 NOTE — TELEPHONE ENCOUNTER
Message handled by Nurse Triage with [facility] - provider name: MD RADHA - urine culture showed no signs of infection, if pt would like he can repeat a urine any time      My chart message sent       Jane Pizarro RN, BSN  [facility]riage        .

## 2018-03-21 NOTE — PROGRESS NOTES
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 Pt had CVA in 10/6/2017 with right-side movement deficits, and left-side facial droop and slurred speech. Transfered to TCU 12/2017 and then homecare services with OT/PT/ SLP.  S/p homograft aortic valve, aortic root replacement 12/19/2017. Hospitalized at the [facility] 12/16/2017-1/4/2018, he was then admitted to [facility] in [place] from 1/4/2017-2/16/2018. Pt was d/c'ed home from 2/16/2018-3/10/2018 and was then hospitalized at [facility] a second time from 3/10/2018-3/11/2018 d/t moises-ascending thoracic aorta fluid collection, leukocytosis, root fracture of lower incisor, aphthous ulcer, acute kidney injury, CAD, chronic normocytic anemia, hypertension, hyperlipidemia, GERD, and depression. Pt has been at home and receiving home care services OT/PT/SLP. At this time Giovanni and Meghna, \"friend\" who lives with pt x6 years and is primary caregiver, are present for an OP PT evaluation to address ongoing concerns with general mobility and R sided weakness. " "Meghna reports PT during homecare services provided seated LE exercises and theraband exercises, however pt does not participate in exercises with her guidance. Reports gains with transfers, initally shelia lift transfer now transfers in/out of mustang car and hospital bed with Total-MaxA assist from Meghna. Reports using standing frame at Rene tolerated x30 sec. At this time, pt is non ambulatory.  He currently using loaner w/c and has ordered manual w/c.   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 Riding [email or web address] and traveling RV with spouse.  Current function: spends most of time sleeping, sitting in recliner at home, awake for meals and f/u MD appointments.   Previous/Current Treatment Physical Therapy;Occupational Therapy   Current Community Support Family/friend caregiver  (Meghna assist with bathing, dressing, tolieting. )   Patient role/Employment history Disabled   Living environment House/[facility]  (Lives Meghna x6 years, \"friend\", not , spouse passed )   Home/Community Accessibility Comments Ramp access to home. Family room and bathroom with walk-in shower main floor. Has transfer chair. Sits on commode in shower. Uses urinal and bedpan. Has raised toliet. Hospital bed.   Current Assistive Devices Manual Wheel Chair   ADL Devices Commode  (Hospital bed)   Patient/Family Goals Statement Gain independance with mobility and self cares, decrease burdon of care   General Information Comments Pt tearful and frustrated.   Fall Risk Screen   Fall screen completed by PT   Have you fallen 2 or more times in the past year? No   Have you fallen and had an injury in the past year? No   Timed Up and Go score (seconds) non-ambulatory   Is patient a fall risk? Yes;Department fall risk interventions implemented   Fall screen comments No falls reported.   Pain   Patient currently in pain No   Cognitive " "Status Examination   Orientation orientation to person, place and time   Level of Consciousness alert   Follows Commands and Answers Questions 75% of the time;able to follow single-step instructions   Personal Safety and Judgment intact   Memory impaired   Cognitive Comment see OT evaluation    Observation   Observation Pt seated in w/c, significant L facial droop, difficulty with secretion managment presents with significant impairments in speech intelligiblity.   Posture   Posture Comments seated in w/c with trunk support: posterior pelvic tilt,  forward and depressed R shoulder; without trunk support: increased forward trunk lean with poor ability to sustain upright poster/ downward gaze   Range of Motion (ROM)   ROM Comment B LE ROM WNL   Strength   Strength Comments L LE: hip flexor 3/5, knee extension 5/5, knee flexion 4/5, ankle DF 5/5; R LE: hip flexion 2+/5, quad lag, trace DF, in supine hooklying R LE falls into abduction, able to sustain neutral aligment for > a few seconds with cues    Musculoskeletal Special Tests   Musculoskeletal Special Tests bridging: able to lift buttock of L side <1\" from mat in hooklying   Bed Mobility   Bed Mobility Comments sit > supine: totalA R LE , Jose L LE;    Transfer Skills   Transfer Comments Total-MaxA x1   Locomotion   Wheel Chair Mobility Comments Meghna assist with propelling standard w/c.    Gait   Gait Comments n/a pt non-ambulatory   Balance   Balance Comments seated balance: able to sustain seated stability x50 seconds without UE support, B foot support on floor, flexed trunk posture without LOB   Sensory Examination   Sensory Perception Comments denies appreciable difference to light touch B LE   Coordination   Coordination Comments impaired    Muscle Tone   Muscle Tone Comments decreased tone R UE/LE   Modality Interventions   Planned Modality Interventions Comments per therapist discretion   Planned Therapy Interventions   Planned Therapy Interventions balance " training;bed mobility training;gait training;motor coordination training;neuromuscular re-education;ROM;strengthening;stretching;transfer training;manual therapy   Clinical Impression   Criteria for Skilled Therapeutic Interventions Met yes, treatment indicated   PT Diagnosis impaired functional mobility   Influenced by the following impairments h/o CVA with R sided weakness, impaired coordination, impaired balance   Functional limitations due to impairments decreased safety with transfers and mobility.     Clinical Presentation Stable/Uncomplicated   Clinical Decision Making (Complexity) Low complexity   Therapy Frequency 2 times/Week   Predicted Duration of Therapy Intervention (days/wks) 12 weeks   Risk & Benefits of therapy have been explained Yes   Patient, Family & other staff in agreement with plan of care Yes   Clinical Impression Comments Pt will benefit from skilled PT to maximize strength and safety with mobility, decrease burden of care.    Education Assessment   Barriers to Learning Physical;Emotional;Cognitive   GOALS   PT Eval Goals 1;2;3;4   Goal 1   Goal Identifier HEP   Goal Description Pt will be independent with A from spouse with HEP for strength in LE's to promote increased safety with mobility.    Target Date 06/12/18   Goal 2   Goal Identifier Sitting tolerance (baseline: seated balance: able to sustain seated stability x50 seconds without UE support, B foot support on floor, flexed trunk posture)   Goal Description Pt will be able to perform dynamic seated balance activities x 5 min to prevent falls when participating in dressing and bathing   Target Date 06/12/18   Goal 3   Goal Identifier Transfers (baseline: Total-MaxA x1)   Goal Description Pt will be able to transfer bed to and from w/c with stand pivot and min A for decreased caregiver burden.   Target Date 06/12/18   Goal 4   Goal Identifier Standing tolerance (baseline: x30 sec with standing frame per report, limited standing  tolerance with totalA)   Goal Description Pt will be able to stand with UE support for greater than 1 minute to allow for A with dressing and transfers.   Target Date 06/12/18   Total Evaluation Time   Total Evaluation Time (Minutes) 55

## 2018-03-23 NOTE — TELEPHONE ENCOUNTER
Reordered referrals and faxed per request. Advised Meghna per below.  Florence Castro RN  [facility]

## 2018-03-23 NOTE — TELEPHONE ENCOUNTER
Pt's significant other (Meghna)called and would would like new orders for therapy speech, OT/PT sent to [facility] Dept the fax # [phone]. He can get in next Wednesday. Meghna states that [facility] is booked out to far and they can get into [facility] sooner. Any questions, Meghna can be reached at [phone].Thank you.  Jo Garay,

## 2018-03-30 NOTE — TELEPHONE ENCOUNTER
Date Forms was received: March 30, 2018    Forms received by: Fax    Last office visit: 03/2018    Purpose of Form:  PT/OT Orders From [facility] /PT plan of care    When the form is due:  ASAP    How the form needs to be returned for patient:  Fax to [phone]    Form currently placed  [place] [employer]

## 2018-04-05 NOTE — MR AVS SNAPSHOT
After Visit Summary   4/5/2018    Cricket Miguel    MRN: [id number]           Patient Information     Date Of Birth          1955        Visit Information        Provider Department      4/5/2018 1:45 PM Alfredito Garzon MD [facility]        Today's Diagnoses     Ischemic cardiomyopathy          Care Instructions    You may decrease the PEPCID to just once per day.  You may decrease the CARVEDILOL to 6.25mg twice per day.            Follow-ups after your visit        Additional Services     Follow-Up with Cardiac Advanced Practice Provider                 Your next 10 appointments already scheduled     Apr 09, 2018 11:00 AM CDT   Neuro Treatment with HOLLY Cristobal   [facility] ([facility])    [address] [address]   [phone]            Apr 11, 2018  1:45 PM CDT   Neuro Treatment with HOLLY Cristobal   [facility] ([facility])    [address] [address]   [phone]            Apr 18, 2018  3:45 PM CDT   Neuro Treatment with HOLLY Bledsoe   [facility] ([facility])    [address] [address]   [phone]            Apr 19, 2018 10:00 AM CDT   Neuro Treatment with HOLLY Bledsoe   [facility] ([facility])    [address] [address]   [phone]            Apr 23, 2018 11:00 AM CDT   Neuro Treatment with HOLLY Cristobal   [facility] ([facility])    [address] [address]   [phone]            Apr 24, 2018 10:00 AM CDT   (Arrive by 9:45 AM)   New Stroke with Alejandro Rothman MD   [facility] ([facility] [place])    [address] [place] [address] [phone]             Apr 25, 2018  3:45 PM CDT   Neuro Treatment with Mariam Gamez, SLP   [facility] ([facility])    [address] [facility] [address]   [phone]            May 04, 2018 11:30 AM CDT   Neuro Treatment with Mariam Gamez, SLP   [facility] ([facility])    [address] [facility] [address]   [phone]            May 08, 2018 11:30 AM CDT   Neuro Treatment with HOLLY Bledsoe   [facility] ([facility])    [address] [facility] [address]   [phone]            May 09, 2018 11:30 AM CDT   Neuro Treatment with HOLLY Bledsoe   [facility] ([facility])    [address] [facility] [address]   [phone]              Future tests that were ordered for you today     Open Future Orders        Priority Expected Expires Ordered    Follow-Up with Cardiac Advanced Practice Provider Routine 7/4/2018 4/5/2019 4/5/2018            Who to contact     If you have questions or need follow up information about today's clinic visit or your schedule please contact [facility] directly at [phone].  Normal or non-critical lab and imaging results will be communicated to you by [employer], letter or phone within 4 business days after the clinic has received the results. If you do not hear from us within 7 days, please contact the clinic through [employer] or phone. If you have a critical or abnormal lab result, we will notify you by phone as soon as possible.  Submit refill requests through [employer] or call your pharmacy and they will forward the refill request to us. Please allow 3 business days for your refill to be completed.          Additional Information About Your Visit        [employer]in[employer] Information     [employer] gives you secure access to your electronic health record. If you see a  primary care provider, you can also send messages to your care team and make appointments. If you have questions, please call your primary care clinic.  If you do not have a primary care provider, please call [phone] and they will assist you.        Care EveryWhere ID     This is your Care EveryWhere ID. This could be used by other organizations to access your [facility] medical records  [id number]        Your Vitals Were     Pulse                   66            Blood Pressure from Last 3 Encounters:   04/05/18 115/81   03/19/18 (!) 123/93   03/13/18 100/80    Weight from Last 3 Encounters:   03/13/18 82.6 kg (182 lb)   03/10/18 83 kg (182 lb 15.7 oz)   02/19/18 81.2 kg (179 lb)              We Performed the Following     Follow-Up with Cardiologist        Primary Care Provider Office Phone # Fax #    Dipak Gordillo [phone] [address] [address]        Equal Access to Services     [facility] Hospital[facility] : Hadii aad ku hadasho Soomaali, waaxda luqadaha, qaybta kaalmada adeegyada, waxay idiin hayaan jonathoneg khmorelia laperlita . So [facility] [phone].    ATENCIÓN: Si habla español, tiene a mendiola disposición servicios gratuitos de asistencia lingüística. [facility] [phone].    We comply with applicable federal civil rights laws and [place] laws. We do not discriminate on the basis of race, color, national origin, age, disability, sex, sexual orientation, or gender identity.            Thank you!     Thank you for choosing [facility]  for your care. Our goal is always to provide you with excellent care. Hearing back from our patients is one way we can continue to improve our services. Please take a few minutes to complete the written survey that you may receive in the mail after your visit with us. Thank you!             Your Updated Medication List - Protect others around you: Learn how to safely use, store and throw away your medicines at  www.disposemymeds.org.          This list is accurate as of 4/5/18  2:26 PM.  Always use your most recent med list.                   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, Cerebrovascular accident (CVA) due to other mechanism (H)       atorvastatin 40 MG tablet    LIPITOR    60 tablet    Take 1 tablet (40 mg) by mouth daily    Cerebrovascular accident (CVA) due to other mechanism (H)       bisacodyl 5 MG EC tablet    DULCOLAX     Take 5 mg by mouth 2 times daily as needed for constipation        bumetanide 1 MG tablet    BUMEX    90 tablet    TAKE 1 TABLET BY MOUTH DAILY    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, Hypertension goal BP (blood pressure) < 140/90       Carboxymethylcellulose Sod PF 0.5 % Soln ophthalmic solution    REFRESH PLUS    1 Bottle    Place 1 drop Into the left eye 3 times daily as needed (to flush debris from eye)    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       carvedilol 12.5 MG tablet    COREG    180 tablet    TAKE 1 TABLET BY MOUTH TWICE DAILY    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, Hypertension goal BP (blood pressure) < 140/90       famotidine 20 MG tablet    PEPCID    60 tablet    Take 1 tablet (20 mg) by mouth 2 times daily    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       iron 325 (65 FE) MG tablet      Take 1 tablet by mouth every evening        losartan 50 MG tablet    COZAAR    90 tablet    TAKE 1/2 TABLET BY MOUTH EVERY 12 HOURS    Hypertension goal BP (blood pressure) < 140/90, Congestive heart failure, NYHA class 2, unspecified congestive heart failure type (H), Cardiomyopathy, unspecified type (H)       magic mouthwash suspension    ENTER INGREDIENTS IN  COMMENTS    240 mL    Take 5-10 mLs by mouth every 4 hours as needed    Aphthous ulcer of mouth       mirtazapine 7.5 MG Tabs tablet    REMERON    90 tablet    Take 3 tablets (22.5 mg) by mouth At Bedtime    Mild single current episode of major depressive disorder (H), Anxiety       multivitamin, therapeutic with minerals Tabs tablet     30 each    Take 1 tablet by mouth daily    Cerebrovascular accident (CVA) due to other mechanism (H)       OLANZapine 2.5 MG tablet    zyPREXA    90 tablet    TAKE 1 TABLET BY MOUTH EVERY DAY    Cerebrovascular accident (CVA) due to other mechanism (H), Anxiety, Delirium due to another medical condition       polyethylene glycol powder    MIRALAX/GLYCOLAX     Take 17 g by mouth every evening as needed for constipation        Potassium Chloride ER 20 MEQ Tbcr     60 tablet    Take 1 tablet (20 mEq) by mouth 2 times daily    Hypokalemia       senna-docusate 8.6-50 MG per tablet    SENOKOT-S;PERICOLACE    100 tablet    Take 1-2 tablets by mouth 2 times daily as needed for constipation    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

## 2018-04-05 NOTE — PATIENT INSTRUCTIONS
You may decrease the PEPCID to just once per day.  You may decrease the CARVEDILOL to 6.25mg twice per day.

## 2018-04-05 NOTE — LETTER
4/5/2018    Dipak Gordillo MD  [address] [facility] [address]    RE: Cricket BUSCH Lamont       Dear Colleague,    I had the pleasure of seeing Cricket Durhamreina in the [facility].    Cardiology Progress Note          Assessment and Plan:     1. 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    From a cardiac standpoint, patient is doing well.  His cardiovascular medications are appropriate.        2. Coronary artery disease status post CABG at the time of his initial valve replacement    Continue medical therapy        3. Stroke with left-sided facial droop and right-sided hemiparesis    Continue rehabilitation        4. Cardiomyopathy, improving    Follow with [facility] clinic    This note was transcribed using electronic voice recognition software and there may be typographical errors present.                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  He had dental work done in the meantime which may have been the source of his infection.  Recent blood culture from March was negative.  His cardiomyopathy with depressed LV function has been improving.  I reviewed the pictures of his most recent echocardiogram and ejection fraction appears to be closer to 50%.  The patient is doing a little bit better in terms of energy.  His girlfriend is with him today.                     Review of Systems:   Review of Systems:  Skin:  Positive for hair changes   Eyes:  Positive for glasses  ENT:  Positive for hearing loss  Respiratory:  Negative    Cardiovascular:  Negative    Gastroenterology: Negative    Genitourinary:  Positive for    Musculoskeletal:  Positive for    Neurologic:  Positive for  paralysis;stroke  Psychiatric:  Positive for excessive stress;anxiety;depression  Heme/Lymph/Imm:  Negative    Endocrine:  Negative                Physical Exam:     Vitals: /81 (BP Location: Left arm, Patient Position: Sitting, Cuff Size: Adult Regular)  Pulse 66  Constitutional:  cooperative        Skin:  warm and dry to the touch surgical scars well-healed      Head:  normocephalic        Eyes:  sclera white   left lid lag and facial droop    ENT:  no pallor or cyanosis        Neck:  JVP normal        Chest:  normal breath sounds, clear to auscultation, normal A-P diameter, normal symmetry, normal respiratory excursion, no use of accessory muscles        Cardiac: regular rhythm       grade 1;RUSB          Abdomen:      G-tube incisions well healed    Extremities and Back:  no edema        Neurological:      left facial droop, right hemiparesis.             Medications:     Current Outpatient Prescriptions   Medication Sig Dispense Refill     carvedilol (COREG) 12.5 MG tablet TAKE 1 TABLET BY MOUTH TWICE DAILY 180 tablet 1     OLANZapine (ZYPREXA) 2.5 MG tablet TAKE 1 TABLET BY MOUTH EVERY DAY 90 tablet 1     bumetanide (BUMEX) 1 MG tablet TAKE 1 TABLET BY MOUTH DAILY 90 tablet 1     magic mouthwash (ENTER INGREDIENTS IN COMMENTS) suspension Take 5-10 mLs by mouth every 4 hours as needed 240 mL 1     Ferrous Sulfate (IRON) 325 (65 FE) MG tablet Take 1 tablet by mouth every evening       bisacodyl (DULCOLAX) 5 MG EC tablet Take 5 mg by mouth 2 times daily as needed for constipation       polyethylene glycol (MIRALAX/GLYCOLAX) powder Take 17 g by mouth every evening as needed for constipation       losartan (COZAAR) 50 MG tablet TAKE 1/2 TABLET BY MOUTH EVERY 12 HOURS 90 tablet 3     mirtazapine (REMERON) 7.5 MG TABS tablet Take 3 tablets (22.5 mg) by mouth At Bedtime 90 tablet 3     Potassium Chloride ER 20 MEQ TBCR Take 1 tablet (20 mEq) by mouth 2 times daily 60 tablet 3     aspirin 81 MG chewable tablet 1  tablet (81 mg) by Oral or Feeding Tube route daily 60 tablet 1     atorvastatin (LIPITOR) 40 MG tablet Take 1 tablet (40 mg) by mouth daily 60 tablet 1     senna-docusate (SENOKOT-S;PERICOLACE) 8.6-50 MG per tablet Take 1-2 tablets by mouth 2 times daily as needed for constipation 100 tablet 0     multivitamin, therapeutic with minerals (THERA-VIT-M) TABS tablet Take 1 tablet by mouth daily 30 each 0     Carboxymethylcellulose Sod PF (REFRESH PLUS) 0.5 % SOLN ophthalmic solution Place 1 drop Into the left eye 3 times daily as needed (to flush debris from eye) 1 Bottle 1     famotidine (PEPCID) 20 MG tablet Take 1 tablet (20 mg) by mouth 2 times daily 60 tablet 1                Data:   All laboratory data reviewed  No results found for this or any previous visit (from the past 24 hour(s)).    All laboratory data reviewed  Lab Results   Component Value Date    CHOL 105 04/03/2016     Lab Results   Component Value Date    HDL 25 04/03/2016     Lab Results   Component Value Date    LDL 56 04/03/2016     Lab Results   Component Value Date    TRIG 265 10/13/2017     Lab Results   Component Value Date    CHOLHDLRATIO 4.2 02/07/2012     TSH   Date Value Ref Range Status   02/19/2018 2.44 0.40 - 4.00 mU/L Final     Last Basic Metabolic Panel:  Lab Results   Component Value Date     03/13/2018      Lab Results   Component Value Date    POTASSIUM 4.3 03/13/2018     Lab Results   Component Value Date    CHLORIDE 104 03/13/2018     Lab Results   Component Value Date    IZABELLA 9.7 03/13/2018     Lab Results   Component Value Date    CO2 28 03/13/2018     Lab Results   Component Value Date    BUN 18 03/13/2018     Lab Results   Component Value Date    CR 1.10 03/13/2018     Lab Results   Component Value Date     03/13/2018     Lab Results   Component Value Date    WBC 9.1 03/13/2018     Lab Results   Component Value Date    RBC 4.50 03/13/2018     Lab Results   Component Value Date    HGB 13.6 03/13/2018     Lab Results    Component Value Date    HCT 42.8 03/13/2018     Lab Results   Component Value Date    MCV 95 03/13/2018     Lab Results   Component Value Date    MCH 30.2 03/13/2018     Lab Results   Component Value Date    MCHC 31.8 03/13/2018     Lab Results   Component Value Date    RDW 12.3 03/13/2018     Lab Results   Component Value Date     03/13/2018       Thank you for allowing me to participate in the care of your patient.    Sincerely,     Alfredito Garzon MD     [facility]

## 2018-04-05 NOTE — PROGRESS NOTES
Cardiology Progress Note          Assessment and Plan:     1. 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    From a cardiac standpoint, patient is doing well.  His cardiovascular medications are appropriate.        2. Coronary artery disease status post CABG at the time of his initial valve replacement    Continue medical therapy        3. Stroke with left-sided facial droop and right-sided hemiparesis    Continue rehabilitation        4. Cardiomyopathy, improving    Follow with [facility] clinic    This note was transcribed using electronic voice recognition software and there may be typographical errors present.                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  He had dental work done in the meantime which may have been the source of his infection.  Recent blood culture from March was negative.  His cardiomyopathy with depressed LV function has been improving.  I reviewed the pictures of his most recent echocardiogram and ejection fraction appears to be closer to 50%.  The patient is doing a little bit better in terms of energy.  His girlfriend is with him today.                     Review of Systems:   Review of Systems:  Skin:  Positive for hair changes   Eyes:  Positive for glasses  ENT:  Positive for hearing loss  Respiratory:  Negative    Cardiovascular:  Negative    Gastroenterology: Negative    Genitourinary:  Positive for    Musculoskeletal:  Positive for    Neurologic:  Positive for paralysis;stroke  Psychiatric:  Positive for excessive stress;anxiety;depression  Heme/Lymph/Imm:  Negative    Endocrine:  Negative                Physical Exam:     Vitals: /81 (BP Location: Left arm, Patient Position:  Sitting, Cuff Size: Adult Regular)  Pulse 66  Constitutional:  cooperative        Skin:  warm and dry to the touch surgical scars well-healed      Head:  normocephalic        Eyes:  sclera white   left lid lag and facial droop    ENT:  no pallor or cyanosis        Neck:  JVP normal        Chest:  normal breath sounds, clear to auscultation, normal A-P diameter, normal symmetry, normal respiratory excursion, no use of accessory muscles        Cardiac: regular rhythm       grade 1;RUSB          Abdomen:      G-tube incisions well healed    Extremities and Back:  no edema        Neurological:      left facial droop, right hemiparesis.             Medications:     Current Outpatient Prescriptions   Medication Sig Dispense Refill     carvedilol (COREG) 12.5 MG tablet TAKE 1 TABLET BY MOUTH TWICE DAILY 180 tablet 1     OLANZapine (ZYPREXA) 2.5 MG tablet TAKE 1 TABLET BY MOUTH EVERY DAY 90 tablet 1     bumetanide (BUMEX) 1 MG tablet TAKE 1 TABLET BY MOUTH DAILY 90 tablet 1     magic mouthwash (ENTER INGREDIENTS IN COMMENTS) suspension Take 5-10 mLs by mouth every 4 hours as needed 240 mL 1     Ferrous Sulfate (IRON) 325 (65 FE) MG tablet Take 1 tablet by mouth every evening       bisacodyl (DULCOLAX) 5 MG EC tablet Take 5 mg by mouth 2 times daily as needed for constipation       polyethylene glycol (MIRALAX/GLYCOLAX) powder Take 17 g by mouth every evening as needed for constipation       losartan (COZAAR) 50 MG tablet TAKE 1/2 TABLET BY MOUTH EVERY 12 HOURS 90 tablet 3     mirtazapine (REMERON) 7.5 MG TABS tablet Take 3 tablets (22.5 mg) by mouth At Bedtime 90 tablet 3     Potassium Chloride ER 20 MEQ TBCR Take 1 tablet (20 mEq) by mouth 2 times daily 60 tablet 3     aspirin 81 MG chewable tablet 1 tablet (81 mg) by Oral or Feeding Tube route daily 60 tablet 1     atorvastatin (LIPITOR) 40 MG tablet Take 1 tablet (40 mg) by mouth daily 60 tablet 1     senna-docusate (SENOKOT-S;PERICOLACE) 8.6-50 MG per tablet Take 1-2  tablets by mouth 2 times daily as needed for constipation 100 tablet 0     multivitamin, therapeutic with minerals (THERA-VIT-M) TABS tablet Take 1 tablet by mouth daily 30 each 0     Carboxymethylcellulose Sod PF (REFRESH PLUS) 0.5 % SOLN ophthalmic solution Place 1 drop Into the left eye 3 times daily as needed (to flush debris from eye) 1 Bottle 1     famotidine (PEPCID) 20 MG tablet Take 1 tablet (20 mg) by mouth 2 times daily 60 tablet 1                Data:   All laboratory data reviewed  No results found for this or any previous visit (from the past 24 hour(s)).    All laboratory data reviewed  Lab Results   Component Value Date    CHOL 105 04/03/2016     Lab Results   Component Value Date    HDL 25 04/03/2016     Lab Results   Component Value Date    LDL 56 04/03/2016     Lab Results   Component Value Date    TRIG 265 10/13/2017     Lab Results   Component Value Date    CHOLHDLRATIO 4.2 02/07/2012     TSH   Date Value Ref Range Status   02/19/2018 2.44 0.40 - 4.00 mU/L Final     Last Basic Metabolic Panel:  Lab Results   Component Value Date     03/13/2018      Lab Results   Component Value Date    POTASSIUM 4.3 03/13/2018     Lab Results   Component Value Date    CHLORIDE 104 03/13/2018     Lab Results   Component Value Date    IZABELLA 9.7 03/13/2018     Lab Results   Component Value Date    CO2 28 03/13/2018     Lab Results   Component Value Date    BUN 18 03/13/2018     Lab Results   Component Value Date    CR 1.10 03/13/2018     Lab Results   Component Value Date     03/13/2018     Lab Results   Component Value Date    WBC 9.1 03/13/2018     Lab Results   Component Value Date    RBC 4.50 03/13/2018     Lab Results   Component Value Date    HGB 13.6 03/13/2018     Lab Results   Component Value Date    HCT 42.8 03/13/2018     Lab Results   Component Value Date    MCV 95 03/13/2018     Lab Results   Component Value Date    MCH 30.2 03/13/2018     Lab Results   Component Value Date    MCHC 31.8  03/13/2018     Lab Results   Component Value Date    RDW 12.3 03/13/2018     Lab Results   Component Value Date     03/13/2018

## 2018-04-11 NOTE — TELEPHONE ENCOUNTER
"[employer] message received:   Giovanni has a appointment on Monday April 16th with a prosthodontic. This is the first appointment to getting new dentures, Does Giovanni need any \"pre-appointment antibiotics\"? I don't believe there will be any intrusive work done but again I am not sure. The appointment is with Yu Huston DDS,MS at [facility] [phone]. Please advise    Tried to call [facility]. The office was closed for the day. Left VM to call team 4 back with direct number tomorrow. Writer will try to call dentist again tomorrow to see what the appointment will entail.    "

## 2018-04-12 NOTE — TELEPHONE ENCOUNTER
[employer] message sent back informing patient that he does not need pre antibiotics for this initial appointment. Requested patient update team 4 when he has future dental appointments.

## 2018-04-12 NOTE — TELEPHONE ENCOUNTER
Spoke to [facility] and they stated that the appointment on Monday April 16th is a new patient consult. Dr. Huston will exam patient, take panoramic x rays but nothing invasive. In the past they have never has patients premedicate for this initial appointment.     Will route to Dr. Garzon to make sure he does not want any pre appointment antibiotics.     [facility] aware that in the future we will want to give patient pre appointment antibiotics for anything invasive. They will ensure that patient has coordinated with team 4 prior to those appointments.

## 2018-04-13 NOTE — TELEPHONE ENCOUNTER
Constipation      Duration: 4 days    Description:       Frequency of bowel movements: 4-5 days       Consistency of stool: soft    Intensity:  mild    Accompanying signs and symptoms: None       Abdominal pain: No       Rectal pain: No, but has rash       Blood in stool: no        Nausea/vomitting: YES- vomited 3 times this morning, yes to nausea but she did give    History:        Similar problems in past: YES- hasn't been regular for a long time per Meghna    Precipitating or alleviating factors: None       Medications worsening symptoms: no     Therapies tried and outcome: Metamucil, Bisacodyl, warm prune juice, none of these have helped.          She said he does have suppositories, Dulcolax medicated suppositories, they have never used it but she has it on hand.    Denies fever at this time. She said his appetite has diminished some but he did he good yesterday.  Today he has had toast. He has not appetite at this time.      Meghna can be reached at [phone], okay to leave detailed message.    Routing to  to review and advise.    RANJEET Crow, RN, N  [facility]) [phone]

## 2018-04-13 NOTE — TELEPHONE ENCOUNTER
Miralax 1 capful daily in 8 oz water    Follow up if any difficulties with constipation/vomiting, r/o obstruction

## 2018-04-13 NOTE — TELEPHONE ENCOUNTER
Meghna notified by phone.  She said she has been giving him Miralax in his water when he takes his pills. He never drinks the whole 8 ounces though.  I advised her to see if she can get him to drink all 8 ounces.    She wants to know if you are okay with her giving him a suppository?      Merary Kiran, RANJEET, RN, N  [facility]) [phone]

## 2018-04-13 NOTE — TELEPHONE ENCOUNTER
Attempt # 1 to [phone]    No answer and VM has not been set up.    Merary Kiran, BS, RN, PHN  [facility]

## 2018-04-20 NOTE — TELEPHONE ENCOUNTER
Completed forms faxed back to [employer] at [phone].   Originals sent to be scanned.     Marge Heath

## 2018-04-20 NOTE — TELEPHONE ENCOUNTER
FUTURE VISIT INFORMATION      FUTURE VISIT INFORMATION:    Date: 4/24/18     Time: 10AM    Location: [facility]  REFERRAL INFORMATION:    Referring provider:      Referring providers clinic:  [facility]    Reason for visit/diagnosis  CVA    RECORDS REQUESTED FROM:       Clinic name Comments Records Status Imaging Status                                         RECORDS STATUS      All of the patient's medical records are in the [employer] system.

## 2018-04-23 NOTE — ADDENDUM NOTE
Encounter addended by: Chelsey Padilla, SLP on: 4/23/2018  8:02 AM<BR>     Actions taken: Sign clinical note, Document created, Flowsheet accepted, Charge Capture section accepted

## 2018-04-23 NOTE — PROGRESS NOTES
Outpatient Speech Language Pathology Progress Note/Medicare 10th Visit Note      Patient: Cricket Miguel  : 1955    Beginning/End Dates of Reporting Period:  3/16/2018 to 2018    Referring Provider: Dr. Duy Quesada MD     Therapy Diagnosis: Severe oral motor function impairments, moderate-severe flaccid dysarthria, mild to moderate expressive/receptive aphasia.    Client Self Report: Mr. Miguel is a 63 y.o. Male who was referred for an OP SLP evaluation following CVA. This was completed on 3/16/2018, please see initial evaluation report in EPIC for further information. At this time Pt has completed 9 treatment sessions with this documenting SLP and Mariam Gamez MA CCC-SLP, who is using NMES to target L oral facial movements. Pt demonstrates inconsistent motivation for therapy, but is willing to complete all exercises during therapy sessions. Carry over with home programing is limited, though Mr. Miguel receives a high degree of support from his significant other, Meghna.     Objective Measurements: See below:              Goals:  Goal Identifier LTG 4-Jopidjzr-Pzaixgxpjqojgom    Goal Description Pt will demonstrate 80% accuracy for intelligiblity and word finding during a 5 minute conversation provided 0-min cues in order to communciate with family and spouse.    Target Date 19-Extend goal 2018   Date Met      Progress: Goal not met, at the conversational level Pt is 60% intell. At single word and 2 word phrases Pt is 75% intell. Provided models and direct cues to use trained intell. Strategies. Continue goal.      Goal Identifier STG 2-Oral Motor   Goal Description Pt will complete recommended oral motor exercises targeting lip, tongue, and cheek strength, coordination of movement and ROM provided direct models and 80% accuracy in coordination with neuromuscular stimulation to address facial paralysis.    Target Date    Date Met      Progress: Goal not met. Pt is  tolerating all exercises with [place] and NMES during sessions, though he continues to present with significant L facial paralysis. Continue goal.      Goal Identifier STG 3-Language-Verbal Expression   Goal Description Pt will complete basic to moderate level word finding tasks with 80% accuracy provided 0-min cues in order to converse with his spouse during meals.    Target Date 06/13/18-Extend goal 7/21/2018   Date Met      Progress: Goal not met, for basic everyday objects Pt is 80-90% accurate, during higher level verbal tasks and conversation Pt's accuracy is reduced to 60-70% provided mod to max cues. Continue goal.      Goal Identifier STG 2-Wsgnfuwr-Ywumino Expression   Goal Description Pt will complete basic level written expression task (writing CVC words, filling in missing letters) with 80% accuracy provided 0-min cues in order to write checks and complete financial documentation.    Target Date 06/13/18   Date Met      Progress: Goal not targeted during this treatment period d/t focus on facial paralysis and V/E.      Goal Identifier STG 2-Qjaefjii-Dunfhlv comprehension   Goal Description Pt will complete basic level reading task (matching picture to words) with 80% accuracy in order to read food labels.    Target Date 06/13/18   Date Met      Progress: Goal not targeted during this tx. Period d/t focus on other primary goals, and d/t visual impairments.      Goal Identifier STG 9-Pvoykpjx-Ilfvhwsg Comprehension   Goal Description Pt will complete complex level auditory comprehension tasks provided 0-min cues with 80% accuracy in order to understand information provided by his physicians at [facility].    Target Date 06/13/18   Date Met      Progress: Goal not met, Pt is averaging 60% accuracy. Continue goal.        Progress Toward Goals:   Progress this reporting period: Pt has demonstrated minimal progress towards oral motor goals, however, he is demonstrating increased independence in use of trained  intelligibility strategies to imrpove his verbal communication. Mr. Miguel continues to present with severe dysarthria and aphasia. SLP recommends Pt continue to receive speech and language services up to 5x/week (SLP recommends 5x/ week, Pt able to commit to 2-3 depending upon the week's scheduling availability) to address the goals outlined above.          Plan:  Continue therapy per current plan of care.    Discharge:  No    Discharge will be planned when the patient has reached long term goals or a plateau of progress has been identified.    Please contact me with any questions or concerns at [phone] or [email or web address].       Chelsey Padilla MA CCC-SLP

## 2018-04-23 NOTE — TELEPHONE ENCOUNTER
Reason for Call:  Other     Detailed comments: [place], [facility], called saying Cricket's PHQ-9 score was 5. His wife thinks it's due to his mental and physical condition with his stroke. She says he is not expressing any self-harm at this time. She says if you have questions to call her at [phone].    Call taken on 4/23/2018 at 1:18 PM by Shabana Denson

## 2018-04-23 NOTE — PROGRESS NOTES
[facility]          OUTPATIENT SPEECH LANGUAGE PATHOLOGY LANGUAGE-COGNITION  EVALUATION  PLAN OF TREATMENT FOR OUTPATIENT REHABILITATION  (COMPLETE FOR INITIAL CLAIMS ONLY)  Patient's Last Name, First Name, M.I.  YOB: 1955  Cricket Miguel                        Provider s Name: [facility] Medical Record No.  [id number]     Onset Date:  12/16/17   Start of Care Date: 03/16/18   Type:     ___PT  __OT   _X_SLP    Medical Diagnosis:   CVA   Speech Language Pathology Diagnosis:   Severe oral motor function impairments, moderate-severe flaccid dysarthria, mild to moderate expressive/receptive aphasia    Visits from SOC: 1                                        ________________________________________________________________________________  Plan of Treatment/Functional Goals:   Planned Therapy Interventions: Language, Communication   Intervention Comments: Oral Motor function-VitalStim recommended for L facial droop in coordination with oral motor exercises.       Language / Cognition Goals  1. Goal Identifier: LTG 0-Xfotqffh-Rqgshkieuejqtvm        Goal Description: Pt will demonstrate 80% accuracy for intelligiblity and word finding during a 5 minute conversation provided 0-min cues in order to communciate with family and spouse.        Target Date: 03/16/19   2. Goal Identifier: STG 2-Oral Motor       Goal Description: Pt will complete recommended oral motor exercises targeting lip, tongue, and cheek strength, coordination of movement and ROM provided direct models and 80% accuracy in coordination with neuromuscular stimulation to address facial paralysis.        Target Date: 06/13/18   3. Goal Identifier: STG 3-Language-Verbal Expression       Goal Description: Pt will complete basic to moderate level word finding tasks with 80% accuracy provided 0-min cues in order to converse with  his spouse during meals.        Target Date: 06/13/18   4. Goal Identifier: STG 1-Rkzxbzxq-Xzbeepl Expression       Goal Description: Pt will complete basic level written expression task (writing CVC words, filling in missing letters) with 80% accuracy provided 0-min cues in order to write checks and complete financial documentation.        Target Date: 06/13/18   5. Goal Identifier: STG 1-Osoticxa-Dmmddzz comprehension       Goal Description: Pt will complete basic level reading task (matching picture to words) with 80% accuracy in order to read food labels.        Target Date: 06/13/18  6.  Goal Identifier: STG 7-Opyuhwsa-Hmgwfcnf Comprehension       Goal Description: Pt will complete complex level auditory comprehension tasks provided 0-min cues with 80% accuracy in order to understand information provided by his physicians at [facility].        Target Date: 06/13/18               Predicted Duration of Therapy Intervention (days/wks): 12-24 weeks pending progress.     Chelsey Padilla MA CCC-SLP       I CERTIFY THE NEED FOR THESE SERVICES FURNISHED UNDER        THIS PLAN OF TREATMENT AND WHILE UNDER MY CARE     (Physician co-signature of this document indicates review and certification of the therapy plan).                  Certification Date From:   3/16/2018  Certification Date To:    6/13/2018        Referring Physician:  Dr. Duy Quesada MD     Initial Assessment        See Epic Evaluation Start Of Care Date: 03/16/18

## 2018-04-24 NOTE — TELEPHONE ENCOUNTER
Message handled by Nurse Triage with [facility] - provider name: ok for prozac 20 mg daily for anxiety and depression .    Called # below     Advised pts girlfriend on the information above     Patient's girlfriend stated an understanding and agreed with plan.    Order placed for medication       pt's girlfriend is wanting to do labs only does not think pt needs to be seen by MD Phi - Rn advised per [facility] pt is due for a PX -     Girlfriend stated she does not think pt needs a PX due to being in the hospital so much in recent months     She would like to have all the labs done that are in [facility] as with a CRP and a sed rate     Does Md [facility] want to see pt or is it okay just to do labs only next month?     Please advise     Thank you    Jane Pizarro RN, BSN  [facility]

## 2018-04-24 NOTE — PROGRESS NOTES
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      His girlfriend is present and notes that he has made steady recovery which was significantly set back by his need for redo aortic root and valve repair in December.  The patient has persistent receptive>receptive aphasia and right hemiplegia.  He has intermittent periods of confusion.  He has been considerably depressed and was followed while an inpatient by Psychiatry.        Past Medical History:   Diagnosis Date     Abscess of aortic root 10/06/2017     AI (aortic insufficiency)     severe     Anxiety      Aortic root dilatation (H)      AR (aortic regurgitation)     severe     Cardiomyopathy (H)      CHF (congestive heart failure), NYHA class II (H)      COPD, mild (H)     spirometry 12/16     CVA (cerebral vascular accident) (H) 10/06/2017    septic emboli - MSSA - cerebellum, Left MCA, Rt Occipital, Aphasia, Left facial paralysis, Right>Left UE/LE weakness, Left visual field cut, moderate to severe encephalopathy, cognitive dysfunction, word finding     Depression 10/06/2017     Heart murmur      Hyperlipidemia LDL goal <70 10/31/2010     Hypertension goal BP (blood pressure) < 140/90      Inguinal hernia      left lung nodule  1/29/2008     Major depressive disorder, single episode, moderate (H)      Psoriasis childhood     SNHL (sensorineural hearing loss)     Left, secondary to CVA     Tobacco use disorder        Current Outpatient Prescriptions   Medication     aspirin 81 MG chewable tablet     atorvastatin (LIPITOR) 40 MG tablet     bisacodyl (DULCOLAX) 5 MG EC tablet     bumetanide (BUMEX) 1 MG tablet     Carboxymethylcellulose Sod PF  (REFRESH PLUS) 0.5 % SOLN ophthalmic solution     carvedilol (COREG) 12.5 MG tablet     famotidine (PEPCID) 20 MG tablet     Ferrous Sulfate (IRON) 325 (65 FE) MG tablet     losartan (COZAAR) 50 MG tablet     mirtazapine (REMERON) 7.5 MG TABS tablet     multivitamin, therapeutic with minerals (THERA-VIT-M) TABS tablet     OLANZapine (ZYPREXA) 2.5 MG tablet     polyethylene glycol (MIRALAX/GLYCOLAX) powder     Potassium Chloride ER 20 MEQ TBCR     senna-docusate (SENOKOT-S;PERICOLACE) 8.6-50 MG per tablet     FLUoxetine (PROZAC) 20 MG capsule     HYDROcodone-acetaminophen (NORCO) 5-325 MG per tablet     magic mouthwash (ENTER INGREDIENTS IN COMMENTS) suspension     vancomycin (VANOCIN) 50 mg/mL LIQD solution     No current facility-administered medications for this visit.        Social History     Social History     Marital status:      Spouse name: N/A     Number of children: 3     Years of education: 12     Occupational History     Not on file.     Social History Main Topics     Smoking status: Former Smoker     Packs/day: 1.00     Years: 35.00     Quit date: 4/1/2016     Smokeless tobacco: Never Used      Comment: 4/2016     Alcohol use No     Drug use: Yes      Comment: marijuana- daily previously     Sexual activity: Yes     Partners: Female     Other Topics Concern     Caffeine Concern Yes     3 cups     Sleep Concern No     Special Diet No     trying to watch salt     Exercise Yes     walking     Seat Belt No     Parent/Sibling W/ Cabg, Mi Or Angioplasty Before 65f 55m? No     Social History Narrative       Family History   Problem Relation Age of Onset     Genetic Disorder Mother      Bone plateover growth Agustin. shoulder surgeries     CANCER Mother      brain cancer     Alzheimer Disease Father        ROS: 10 point relevant ROS neg other than the symptoms noted above in the HPI.    BP 94/67 (BP Location: Right arm, Patient Position: Chair, Cuff Size: Adult Regular)  Pulse 62  Ht 1.727 m (5'  "8\")    General: Somnolent, sleeping on the exam bed.  Withdrawn affect.      MENTAL STATUS:  Takes moderate stimulation to arouse, maintains attentions transiently.  Poor historian, most history obtained from girlfriend and chart.  Receptive>expressive aphasia, although he is able to communicate verbally.  Prominent dysarthria  CRANIAL NERVES:  Pupils are equal, round, reactive to light.  Extraocular movements full.  R VF cut.  Facial sensation intact.  R lower facial droop.    Motor: 5/5, normal tone on left, significant right hemiparesis although he retains volitional movement of his r hand.  Increased tone, spasticity RUE.  Sensory:  decreased on right  Coordination: Finger-nose-finger, fine finger movement, heel-shin maneuver without ataxia out of proportion to weakness on R.  Mild ataxia in LUE on FTN.  Gait:  Wheelchair    Neuro Imaging: as per HPI    Laboratory:  Lab Results   Component Value Date    A1C 5.3 12/19/2017    A1C 6.4 10/07/2017    A1C  05/18/2016     Below Assay Range   Canceled, Test credited  HORACE ON ICU AT 0705         Cholesterol   Date Value Ref Range Status   04/25/2018 107 <200 mg/dL Final   04/03/2016 105 (L) 115 - 199 mg/dL Final     HDL Cholesterol   Date Value Ref Range Status   04/25/2018 39 (L) >39 mg/dL Final   04/03/2016 25 mg/dL Final     LDL Cholesterol Calculated   Date Value Ref Range Status   04/25/2018 41 <100 mg/dL Final     Comment:     Desirable:       <100 mg/dl   04/03/2016 56 mg/dL Final     Triglycerides   Date Value Ref Range Status   04/25/2018 133 <150 mg/dL Final     Comment:     Fasting specimen   10/13/2017 265 (H) <150 mg/dL Final     Comment:     Borderline high:  150-199 mg/dl  High:             200-499 mg/dl  Very high:       >499 mg/dl       Cholesterol/HDL Ratio   Date Value Ref Range Status   02/07/2012 4.2 0.0 - 5.0 Final       Impression: 63 year old with HTN, HL, Depression, CHF, aortic stenosis and 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      Recommendations:  1. Continue Aspirin  2. Continue Atorvastatin  3. It will be difficult to maximize his rehabilitation potential given his degree of apparent and reported depression.  Recommended referral back to Psychiatry, but girlfriend would prefer to discuss treatment options with his PCP first  4. Will refer to Dr. Sherif Nix to assess for possible treatment with transcranial magnetic stimulation for motor recovery.  5. Consider referral to Dr. Lisa Flores should their be concenrns about spasticity.  6. Follow-up in stroke clinic prn.    Alejandro Rothman MD, MS  Vascular Neurology

## 2018-04-24 NOTE — PATIENT INSTRUCTIONS
In order to maximize Giovanni's stroke recovery, his depression needs to be treated.  Consider referral to Psychiatry for an evaluation.     Additionally, I will ask Dr. Hernandez to evaluate him for transcranial magnetic stimulation.    If there are additional rehabilitation needs in the future, including botox, I will refer him to Dr. Flores.  You can call me if this is needed.    You will not need to follow-up with me, but please call with questions.  For any emergent stroke symptoms call 911 and go straight to the hospital.

## 2018-04-24 NOTE — TELEPHONE ENCOUNTER
Orders placed     Called # below     Advised on the information below     Patient's girlfriend stated an understanding and agreed with plan.    Jane Pizarro RN, BSN  [facility]

## 2018-04-24 NOTE — MR AVS SNAPSHOT
After Visit Summary   4/24/2018    Cricket Miguel    MRN: [id number]           Patient Information     Date Of Birth          1955        Visit Information        Provider Department      4/24/2018 10:00 AM Alejandro Rothman MD [facility] Neurology        Care Instructions    In order to maximize Giovanni's stroke recovery, his depression needs to be treated.    I will refer him to Psychiatry for an evaluation.  They can call you to schedule    Additionally, I will ask Dr. Hernandez to evaluate him for transcranial magnetic stimulation.    If there are additional rehabilitation needs in the future, including botox, I will refer him to Dr. Flores.  You can call me if this is needed.    You will not need to follow-up with me, but please call with questions.  For any emergent stroke symptoms call 911 and go straight to the hospital.          Follow-ups after your visit        Follow-up notes from your care team     Return if symptoms worsen or fail to improve.      Your next 10 appointments already scheduled     Apr 25, 2018  3:45 PM CDT   Neuro Treatment with HOLLY Bledsoe   [facility] Speech Therapy ([facility])    [address] [address]   [phone]            May 04, 2018 11:30 AM CDT   Neuro Treatment with HOLLY Bledsoe   [facility] Speech Therapy [facility])    [address] [address]   [phone]            May 08, 2018 11:30 AM CDT   Neuro Treatment with HOLLY Bledsoe   [facility] Therapy ([facility])    [address] [address]   [phone]            May 11, 2018 11:30 AM CDT   Neuro Treatment with HOLLY Bledsoe   [facility] Therapy ([facility])    [address] [address]   [phone]            May 18, 2018  2:45 PM CDT   Neuro Treatment with HOLLY Bledsoe    [facility] Speech Therapy ([facility])    [address] [address]   [phone]            May 22, 2018  9:15 AM CDT   Neuro Treatment with Mariam Gamez, SLP   [facility] Therapy ([facility])    [address] [address]   [phone]            May 25, 2018 11:30 AM CDT   Neuro Treatment with HOLLY Bledsoe   [facility] ([facility])    [address] [address]   [phone]            May 29, 2018  2:00 PM CDT   Neuro Treatment with HOLLY Bledsoe   [facility] ([facility])    [address] [address]   [phone]            Jun 06, 2018 11:15 AM CDT   Neuro Treatment with HOLLY Cristobal   [facility] ([facility])    [address] [address]   [phone]            Jun 07, 2018 11:30 AM CDT   Neuro Treatment with HOLLY Bledsoe   [facility] ([facility])    [address] [address]   [phone]              Who to contact     Please call your clinic at [phone] to:    Ask questions about your health    Make or cancel appointments    Discuss your medicines    Learn about your test results    Speak to your doctor            Additional Information About Your Visit        [facility] Information     [facility] gives you secure access to your electronic health record. If you see a primary care provider, you can also send messages to your care team and make appointments. If you have questions, please call your primary care clinic.  If you do not have a primary care provider, please call [phone] and they will assist you.      [facility] is an electronic gateway that provides easy, online access to your medical records. With [facility] [employer] [facility], you can request a clinic appointment, read  "your test results, renew a prescription or communicate with your care team.     To access your existing account, please contact your [facility] or call [phone] for assistance.        Care EveryWhere ID     This is your Care EveryWhere ID. This could be used by other organizations to access your [place] medical records  [id number]        Your Vitals Were     Pulse Height                62 1.727 m (5' 8\")           Blood Pressure from Last 3 Encounters:   04/24/18 94/67   04/05/18 115/81   03/19/18 (!) 123/93    Weight from Last 3 Encounters:   03/13/18 82.6 kg (182 lb)   03/10/18 83 kg (182 lb 15.7 oz)   02/19/18 81.2 kg (179 lb)              Today, you had the following     No orders found for display       Primary Care Provider Office Phone # Fax #    Dipak Gordillo [phone]       [address] [place] [address]        Equal Access to Services     [facility] Center[facility]ATH : Hadii aad ku hadasho Soomaali, waaxda luqadaha, qaybta kaalmada adeegyada, waxay idiin hayaan adeeg kharaniles brady'radha . So [facility] [phone].    ATENCIÓN: Si habla español, tiene a mendiola disposición servicios gratuitos de asistencia lingüística. Llame al [phone].    We comply with applicable federal civil rights laws and [place] laws. We do not discriminate on the basis of race, color, national origin, age, disability, sex, sexual orientation, or gender identity.            Thank you!     Thank you for choosing [facility] NEUROLOGY  for your care. Our goal is always to provide you with excellent care. Hearing back from our patients is one way we can continue to improve our services. Please take a few minutes to complete the written survey that you may receive in the mail after your visit with us. Thank you!             Your Updated Medication List - Protect others around you: Learn how to safely use, store and throw away your medicines at www.disposemymeds.org.          This list is accurate " as of 4/24/18 11:13 AM.  Always use your most recent med list.                   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, Cerebrovascular accident (CVA) due to other mechanism (H)       atorvastatin 40 MG tablet    LIPITOR    60 tablet    Take 1 tablet (40 mg) by mouth daily    Cerebrovascular accident (CVA) due to other mechanism (H)       bisacodyl 5 MG EC tablet    DULCOLAX     Take 5 mg by mouth 2 times daily as needed for constipation        bumetanide 1 MG tablet    BUMEX    90 tablet    TAKE 1 TABLET BY MOUTH DAILY    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, Hypertension goal BP (blood pressure) < 140/90       Carboxymethylcellulose Sod PF 0.5 % Soln ophthalmic solution    REFRESH PLUS    1 Bottle    Place 1 drop Into the left eye 3 times daily as needed (to flush debris from eye)    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       carvedilol 12.5 MG tablet    COREG    180 tablet    TAKE 1 TABLET BY MOUTH TWICE DAILY    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, Hypertension goal BP (blood pressure) < 140/90       famotidine 20 MG tablet    PEPCID    60 tablet    Take 1 tablet (20 mg) by mouth 2 times daily    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       iron 325 (65 Fe) MG tablet      Take 1 tablet by mouth every evening        losartan 50 MG tablet    COZAAR    90 tablet    TAKE 1/2 TABLET BY MOUTH EVERY 12 HOURS    Hypertension goal BP (blood pressure) < 140/90, Congestive heart failure, NYHA class 2, unspecified congestive heart failure type (H), Cardiomyopathy, unspecified type (H)       magic mouthwash suspension    ENTER INGREDIENTS IN COMMENTS    240 mL    Take 5-10 mLs by mouth every 4 hours  as needed    Aphthous ulcer of mouth       mirtazapine 7.5 MG Tabs tablet    REMERON    90 tablet    Take 3 tablets (22.5 mg) by mouth At Bedtime    Mild single current episode of major depressive disorder (H), Anxiety       multivitamin, therapeutic with minerals Tabs tablet     30 each    Take 1 tablet by mouth daily    Cerebrovascular accident (CVA) due to other mechanism (H)       OLANZapine 2.5 MG tablet    zyPREXA    90 tablet    TAKE 1 TABLET BY MOUTH EVERY DAY    Cerebrovascular accident (CVA) due to other mechanism (H), Anxiety, Delirium due to another medical condition       polyethylene glycol powder    MIRALAX/GLYCOLAX     Take 17 g by mouth every evening as needed for constipation        Potassium Chloride ER 20 MEQ Tbcr     60 tablet    Take 1 tablet (20 mEq) by mouth 2 times daily    Hypokalemia       senna-docusate 8.6-50 MG per tablet    SENOKOT-S;PERICOLACE    100 tablet    Take 1-2 tablets by mouth 2 times daily as needed for constipation    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

## 2018-04-24 NOTE — TELEPHONE ENCOUNTER
Ok for labs     Cmp, cbc, ck, tsh reflex, vitamin D/B12, UA, microalbumin, fit, psa, lipid reflex, HIV, hep C, free t4, bnp    PFT's    Advise follow up afterwards to review    Agree with prozac

## 2018-04-24 NOTE — LETTER
4/24/2018       RE: Cricket Miguel  [address] [place] [address]     Dear Colleague,    Thank you for referring your patient, Cricket Miguel, to the [facility] NEUROLOGY at [facility]. Please see a copy of my visit note below.    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      His girlfriend is present and notes that he has made steady recovery which was significantly set back by his need for redo aortic root and valve repair in December.  The patient has persistent receptive>receptive aphasia and right hemiplegia.  He has intermittent periods of confusion.  He has been considerably depressed and was followed while an inpatient by Psychiatry.        Past Medical History:   Diagnosis Date     Abscess of aortic root 10/06/2017     AI (aortic insufficiency)     severe     Anxiety      Aortic root dilatation (H)      AR (aortic regurgitation)     severe     Cardiomyopathy (H)      CHF (congestive heart failure), NYHA class II (H)      COPD, mild (H)     spirometry 12/16     CVA (cerebral vascular accident) (H) 10/06/2017    septic emboli - MSSA - cerebellum, Left MCA, Rt Occipital, Aphasia, Left facial paralysis, Right>Left UE/LE weakness, Left visual field cut, moderate to severe encephalopathy, cognitive dysfunction, word finding     Depression 10/06/2017     Heart murmur      Hyperlipidemia LDL goal <70 10/31/2010     Hypertension goal BP (blood pressure) < 140/90      Inguinal hernia      left lung nodule  1/29/2008     Major depressive disorder, single episode, moderate (H)      Psoriasis childhood     SNHL (sensorineural hearing loss)     Left,  secondary to CVA     Tobacco use disorder        Current Outpatient Prescriptions   Medication     aspirin 81 MG chewable tablet     atorvastatin (LIPITOR) 40 MG tablet     bisacodyl (DULCOLAX) 5 MG EC tablet     bumetanide (BUMEX) 1 MG tablet     Carboxymethylcellulose Sod PF (REFRESH PLUS) 0.5 % SOLN ophthalmic solution     carvedilol (COREG) 12.5 MG tablet     famotidine (PEPCID) 20 MG tablet     Ferrous Sulfate (IRON) 325 (65 FE) MG tablet     losartan (COZAAR) 50 MG tablet     mirtazapine (REMERON) 7.5 MG TABS tablet     multivitamin, therapeutic with minerals (THERA-VIT-M) TABS tablet     OLANZapine (ZYPREXA) 2.5 MG tablet     polyethylene glycol (MIRALAX/GLYCOLAX) powder     Potassium Chloride ER 20 MEQ TBCR     senna-docusate (SENOKOT-S;PERICOLACE) 8.6-50 MG per tablet     FLUoxetine (PROZAC) 20 MG capsule     HYDROcodone-acetaminophen (NORCO) 5-325 MG per tablet     magic mouthwash (ENTER INGREDIENTS IN COMMENTS) suspension     vancomycin (VANOCIN) 50 mg/mL LIQD solution     No current facility-administered medications for this visit.        Social History     Social History     Marital status:      Spouse name: N/A     Number of children: 3     Years of education: 12     Occupational History     Not on file.     Social History Main Topics     Smoking status: Former Smoker     Packs/day: 1.00     Years: 35.00     Quit date: 4/1/2016     Smokeless tobacco: Never Used      Comment: 4/2016     Alcohol use No     Drug use: Yes      Comment: marijuana- daily previously     Sexual activity: Yes     Partners: Female     Other Topics Concern     Caffeine Concern Yes     3 cups     Sleep Concern No     Special Diet No     trying to watch salt     Exercise Yes     walking     Seat Belt No     Parent/Sibling W/ Cabg, Mi Or Angioplasty Before 65f 55m? No     Social History Narrative       Family History   Problem Relation Age of Onset     Genetic Disorder Mother      Bone plateover growth Agustin. shoulder surgeries  "    CANCER Mother      brain cancer     Alzheimer Disease Father        ROS: 10 point relevant ROS neg other than the symptoms noted above in the HPI.    BP 94/67 (BP Location: Right arm, Patient Position: Chair, Cuff Size: Adult Regular)  Pulse 62  Ht 1.727 m (5' 8\")    General: Somnolent, sleeping on the exam bed.  Withdrawn affect.      MENTAL STATUS:  Takes moderate stimulation to arouse, maintains attentions transiently.  Poor historian, most history obtained from girlfriend and chart.  Receptive>expressive aphasia, although he is able to communicate verbally.  Prominent dysarthria  CRANIAL NERVES:  Pupils are equal, round, reactive to light.  Extraocular movements full.  R VF cut.  Facial sensation intact.  R lower facial droop.    Motor: 5/5, normal tone on left, significant right hemiparesis although he retains volitional movement of his r hand.  Increased tone, spasticity RUE.  Sensory:  decreased on right  Coordination: Finger-nose-finger, fine finger movement, heel-shin maneuver without ataxia out of proportion to weakness on R.  Mild ataxia in LUE on FTN.  Gait:  Wheelchair    Neuro Imaging: as per HPI    Laboratory:  Lab Results   Component Value Date    A1C 5.3 12/19/2017    A1C 6.4 10/07/2017    A1C  05/18/2016     Below Assay Range   Canceled, Test credited  HORACE ON ICU AT 0705         Cholesterol   Date Value Ref Range Status   04/25/2018 107 <200 mg/dL Final   04/03/2016 105 (L) 115 - 199 mg/dL Final     HDL Cholesterol   Date Value Ref Range Status   04/25/2018 39 (L) >39 mg/dL Final   04/03/2016 25 mg/dL Final     LDL Cholesterol Calculated   Date Value Ref Range Status   04/25/2018 41 <100 mg/dL Final     Comment:     Desirable:       <100 mg/dl   04/03/2016 56 mg/dL Final     Triglycerides   Date Value Ref Range Status   04/25/2018 133 <150 mg/dL Final     Comment:     Fasting specimen   10/13/2017 265 (H) <150 mg/dL Final     Comment:     Borderline high:  150-199 mg/dl  High:             " 200-499 mg/dl  Very high:       >499 mg/dl       Cholesterol/HDL Ratio   Date Value Ref Range Status   02/07/2012 4.2 0.0 - 5.0 Final       Impression: 63 year old with HTN, HL, Depression, CHF, aortic stenosis and 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      Recommendations:  1. Continue Aspirin  2. Continue Atorvastatin  3. It will be difficult to maximize his rehabilitation potential given his degree of apparent and reported depression.  Recommended referral back to Psychiatry, but girlfriend would prefer to discuss treatment options with his PCP first  4. Will refer to Dr. Sherif Nix to assess for possible treatment with transcranial magnetic stimulation for motor recovery.  5. Consider referral to Dr. Lisa Flores should their be concenrns about spasticity.  6. Follow-up in stroke clinic prn.    Alejandro Rothman MD, MS  Vascular Neurology

## 2018-04-24 NOTE — TELEPHONE ENCOUNTER
Reason for Call:  Same Day Appointment, Requested Provider:  Dr Gordillo    PCP: Dipak Gordillo    Reason for visit: Told to get labs and be seen a week later, per conversation with Jane RAE.  The Labs are scheduled for 4/25.    Duration of symptoms: unknown    Have you been treated for this in the past? Yes    Additional comments: Please call to advise patient if there is a time to see the patient per Dr Gordillo    Can we leave a detailed message on this number? YES    Phone number patient can be reached at: Home number on file [phone] (home)    Best Time: anytime  Call taken on 4/24/2018 at 2:28 PM by Jena Garcia

## 2018-04-24 NOTE — TELEPHONE ENCOUNTER
Reason for Call:  Other     Detailed comments: Meghna is calling saying they just came back from Cricket's neurology appointment at the [facility]. They are wondering if Dr. Gordillo would be comfortable prescribing him the medication Prozac. She would like a call back.    Phone Number Patient can be reached at: Cell number on file:    Telephone Information:   Mobile [phone]     Best Time: Anytime    Can we leave a detailed message on this number? YES    Call taken on 4/24/2018 at 12:52 PM by Shabana Denson

## 2018-04-25 NOTE — TELEPHONE ENCOUNTER
Vaishnavi message received:     We are working towards Giovanni getting dentures. Due to his stroke he will need implants for the lower denture. 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 We have scheduled a consultation with [facility] oral surgery for May 1st. My question is what kind of antibiotics do you want him to be on prior to this surgery? And should he be on any antibiotics after he receives these dentures in case of any sores that may happen? Remember this is the tera that just loves to get blood infections! Thank you for your time.        In the past pt has had IV Ampicillin 2,000mg and IV Vancomycin 2,000 mg.     Will route to Dr. Garzon to review.

## 2018-04-25 NOTE — MR AVS SNAPSHOT
After Visit Summary   4/25/2018    Cricket Miguel    MRN: [id number]           Patient Information     Date Of Birth          1955        Visit Information        Provider Department      4/25/2018 9:30 AM  ANTICOAGULATION CLINIC [facility] [place]        Today's Diagnoses     Sore in mouth    -  1       Follow-ups after your visit        Your next 10 appointments already scheduled     May 04, 2018 11:30 AM CDT   Neuro Treatment with HOLLY Bledsoe   [facility] Speech Therapy ([facility])    [address] [facility] [address]   [phone]            May 08, 2018 11:30 AM CDT   Neuro Treatment with HOLLY Bledsoe   [facility] Speech Therapy ([facility])    [address] [facility] [address]   [phone]            May 11, 2018 11:30 AM CDT   Neuro Treatment with HOLLY Bledsoe   [facility] Speech Therapy ([facility])    [address] [facility] [address]   [phone]            May 18, 2018  2:45 PM CDT   Neuro Treatment with HOLLY Bledsoe   [facility] Speech Therapy ([facility])    [address] [facility] [address]   [phone]            May 22, 2018  9:15 AM CDT   Neuro Treatment with HOLLY Bledsoe   [facility] Speech Therapy ([facility])    [address] [facility] [address]   [phone]            May 25, 2018 11:30 AM CDT   Neuro Treatment with HOLLY Bledsoe   [facility] Speech Therapy ([facility])    [address] [facility] [address]   [phone]            May 29, 2018  2:00 PM CDT   Neuro Treatment with HOLLY Bledsoe   [facility] Speech Therapy ([facility])    [address] [facility] [address]   [phone]            Jun 06, 2018 11:15 AM CDT   Neuro Treatment with HOLLY Cristobal   [facility] ([facility])    [address]  [facility] [address]   [phone]            Jun 07, 2018 11:30 AM CDT   Neuro Treatment with HOLLY Bledsoe   [facility] Speech Therapy ([facility])    [address]  [facility] [address]   [phone]            Jun 08, 2018  2:00 PM CDT   Neuro Treatment with HOLLY Bledsoe   [facility] Speech Therapy ([facility])    [address] [facility] [address]   [phone]              Future tests that were ordered for you today     Open Future Orders        Priority Expected Expires Ordered    **TSH with free T4 reflex FUTURE 6mo Routine 10/21/2018 10/24/2018 4/24/2018    Albumin Random Urine Quantitative with Creat Ratio Routine 10/21/2018 10/24/2018 4/24/2018    Fecal colorectal cancer screen (FIT) Routine 5/15/2018 10/24/2018 4/24/2018    Spirometry, Breathing Capacity Routine  10/24/2018 4/24/2018            Who to contact     If you have questions or need follow up information about today's clinic visit or your schedule please contact [facility] directly at [phone].  Normal or non-critical lab and imaging results will be communicated to you by MyChart, letter or phone within 4 business days after the clinic has received the results. If you do not hear from us within 7 days, please contact the clinic through [employer] or phone. If you have a critical or abnormal lab result, we will notify you by phone as soon as possible.  Submit refill requests through [employer] or call your pharmacy and they will forward the refill request to us. Please allow 3 business days for your refill to be completed.          Additional Information About Your Visit        [employer] Information     [employer] gives you secure access to your electronic health record. If you see a primary care provider, you can also send messages to your care team and make appointments. If  you have questions, please call your primary care clinic.  If you do not have a primary care provider, please call [phone] and they will assist you.        Care EveryWhere ID     This is your Care EveryWhere ID. This could be used by other organizations to access your [facility] medical records  [id number]         Blood Pressure from Last 3 Encounters:   04/24/18 94/67   04/05/18 115/81   03/19/18 (!) 123/93    Weight from Last 3 Encounters:   03/13/18 182 lb (82.6 kg)   03/10/18 182 lb 15.7 oz (83 kg)   02/19/18 179 lb (81.2 kg)              Today, you had the following     No orders found for display       Primary Care Provider Office Phone # Fax #    Dipak Gordillo [phone] [address] [facility] [address]        Equal Access to Services     [facility]: Hadii jenise ku hadasho Soomaali, waaxda luqadaha, qaybta kaalmada adeegyada, freddie rodríguez . So [facility] [phone].    ATENCIÓN: Si habla español, tiene a mendiola disposición servicios gratuitos de asistencia lingüística. Llame al [phone].    We comply with applicable federal civil rights laws and [place] laws. We do not discriminate on the basis of race, color, national origin, age, disability, sex, sexual orientation, or gender identity.            Thank you!     Thank you for choosing [facility]  for your care. Our goal is always to provide you with excellent care. Hearing back from our patients is one way we can continue to improve our services. Please take a few minutes to complete the written survey that you may receive in the mail after your visit with us. Thank you!             Your Updated Medication List - Protect others around you: Learn how to safely use, store and throw away your medicines at www.disposemymeds.org.          This list is accurate as of 4/25/18  1:32 PM.  Always use your most recent med list.                   Brand Name Dispense Instructions for use  Diagnosis    aspirin 81 MG chewable tablet     60 tablet    1 tablet (81 mg) by Oral or Feeding Tube route daily    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, Cerebrovascular accident (CVA) due to other mechanism (H)       atorvastatin 40 MG tablet    LIPITOR    60 tablet    Take 1 tablet (40 mg) by mouth daily    Cerebrovascular accident (CVA) due to other mechanism (H)       bisacodyl 5 MG EC tablet    DULCOLAX     Take 5 mg by mouth 2 times daily as needed for constipation        bumetanide 1 MG tablet    BUMEX    90 tablet    TAKE 1 TABLET BY MOUTH DAILY    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, Hypertension goal BP (blood pressure) < 140/90       Carboxymethylcellulose Sod PF 0.5 % Soln ophthalmic solution    REFRESH PLUS    1 Bottle    Place 1 drop Into the left eye 3 times daily as needed (to flush debris from eye)    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       carvedilol 12.5 MG tablet    COREG    180 tablet    TAKE 1 TABLET BY MOUTH TWICE DAILY    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, Hypertension goal BP (blood pressure) < 140/90       famotidine 20 MG tablet    PEPCID    60 tablet    Take 1 tablet (20 mg) by mouth 2 times daily    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       FLUoxetine 20 MG capsule    PROzac    90 capsule    Take 1 capsule (20 mg) by mouth daily    Major depressive disorder, single episode, moderate (H), Anxiety       iron 325 (65 Fe) MG tablet      Take 1 tablet by mouth every evening        losartan 50 MG tablet    COZAAR    90 tablet    TAKE 1/2 TABLET BY MOUTH EVERY 12 HOURS    Hypertension goal BP (blood pressure) < 140/90, Congestive heart failure, NYHA class 2, unspecified congestive heart failure type (H), Cardiomyopathy, unspecified type (H)       magic mouthwash suspension    ENTER INGREDIENTS IN COMMENTS    240 mL     Take 5-10 mLs by mouth every 4 hours as needed    Aphthous ulcer of mouth       mirtazapine 7.5 MG Tabs tablet    REMERON    90 tablet    Take 3 tablets (22.5 mg) by mouth At Bedtime    Mild single current episode of major depressive disorder (H), Anxiety       multivitamin, therapeutic with minerals Tabs tablet     30 each    Take 1 tablet by mouth daily    Cerebrovascular accident (CVA) due to other mechanism (H)       OLANZapine 2.5 MG tablet    zyPREXA    90 tablet    TAKE 1 TABLET BY MOUTH EVERY DAY    Cerebrovascular accident (CVA) due to other mechanism (H), Anxiety, Delirium due to another medical condition       polyethylene glycol powder    MIRALAX/GLYCOLAX     Take 17 g by mouth every evening as needed for constipation        Potassium Chloride ER 20 MEQ Tbcr     60 tablet    Take 1 tablet (20 mEq) by mouth 2 times daily    Hypokalemia       senna-docusate 8.6-50 MG per tablet    SENOKOT-S;PERICOLACE    100 tablet    Take 1-2 tablets by mouth 2 times daily as needed for constipation    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

## 2018-04-25 NOTE — TELEPHONE ENCOUNTER
The 11:20 spot on 05/02/2018 does not work for them as they will be in [facility]. Meghna says to schedule it any day that week (the week of the 1st) late in the day or the week after.        Shabana Denson  Patient Representative

## 2018-04-25 NOTE — TELEPHONE ENCOUNTER
Pt came in for labs this morning and noted they had a swollen bottom lip on the right side. No SOB, no trouble swallowing, etc.     Meghna, significant other noted that the pt just started prozac last night and wondered if this was a reaction or s/e.    Triage examined area. There is a lump below the lip along right side gum line of the pts mouth that the pt is having taken care of by their  and there was a cold sore towards the surface that was noted. Meghna said that it was not there this morning and that the pt normally uses magic mouth wash for this.    Triage advised that the pt has refills of this if needed at [facility] and they were headed there anyways. Advised to continue current medications and alert the clinic to any further changes.      The patient indicates understanding of these issues and agrees with the plan.  Florence Castro RN  [facility]

## 2018-04-25 NOTE — TELEPHONE ENCOUNTER
Message left that the patient is scheduled for 3:40 p.m. on 05/07/2018 with Dr. Gordillo. I told them to call back if this does not work for them.      Shabana Denson  Patient Representative

## 2018-04-25 NOTE — PROGRESS NOTES
See encounter from today.  The patient indicates understanding of these issues and agrees with the plan.  Florence Castro RN  [facility]

## 2018-04-30 NOTE — TELEPHONE ENCOUNTER
[facility] Call Center    Phone Message    May a detailed message be left on voicemail: yes    Reason for Call: Other: Meghna calling to tell Dr. Rothman that Dr. Gordillo has prescribed generic Prozac at 20mg and has referred him to psychiatry. Meghna states if Dr. Rothman would prefer Prozac as opposed to the generic medication, please give her a call.     Action Taken: Message routed to:  [facility] ([facility]):  NEUROLOGY

## 2018-05-01 NOTE — TELEPHONE ENCOUNTER
No preference for generic or brand fluoxetine.  Please inform patient.  Thank you.    Alejandro Rothman MD, MS  Neurology    Please contact the stroke service with any questions: link to Text page

## 2018-05-07 NOTE — ED AVS SNAPSHOT
[facility] Emergency Department    [address]    [facility] [address]    Phone:  [phone]    Fax:  [phone]                                       Cricket Miguel   MRN: [id number]    Department:  [facility] Emergency Department   Date of Visit:  5/7/2018           After Visit Summary Signature Page     I have received my discharge instructions, and my questions have been answered. I have discussed any challenges I see with this plan with the nurse or doctor.    ..........................................................................................................................................  Patient/Patient Representative Signature      ..........................................................................................................................................  Patient Representative Print Name and Relationship to Patient    ..................................................               ................................................  Date                                            Time    ..........................................................................................................................................  Reviewed by Signature/Title    ...................................................              ..............................................  Date                                                            Time

## 2018-05-07 NOTE — ED TRIAGE NOTES
Pt in with severe abdominal pain, diarrhea, and sore to bottom. Denies SOB. ABC's intact, alert and oriented X3.

## 2018-05-07 NOTE — TELEPHONE ENCOUNTER
Significant other, Meghna, calling to report that patient is having significant pain in his buttock due to boil.  She said he has been having loose BM's and has been tearful with BM's due to pain.  She is unsure if the pain is just from the boil/sore or something else.      Patient has an appointment this afternoon with TS at 3 pm. Meghna is unsure if patient can wait until that appointment.    I advised her that if he is in a lot of pain I would advise he be seen sooner either at urgent care or ER.    She verbalized understanding. She will let us know if they decide to go to urgent care or ER.    Merary Kiran, BS, RN, PHN  [facility]) [phone]

## 2018-05-07 NOTE — ED NOTES
Pt provided with discharge paperwork and educated on how to manage C-diff at home. Pt educated on how to take vancomycin and norco. Questions answered of family members. Pt voiced understanding and denied any questions at discharge.

## 2018-05-07 NOTE — DISCHARGE INSTRUCTIONS
What Is C. diff?  C. diff is an infection caused by Clostridium difficile (C. diff) bacteria. These are germs that live in the part of your belly called your colon, or large intestine. They don't usually cause problems, but if the normal balance of good and bad bacteria in your colon changes, C. diff bacteria can grow out of control and lead to infection. This can harm your colon and cause diarrhea and belly pain.  What are the symptoms of C. diff?  Some people with C. diff have no symptoms, but they can still pass the infection to others. Symptoms can include:    Watery diarrhea    Fever    Belly pain and cramping    Nausea and vomiting    Loss of appetite and weight loss   Who is most likely to get C. diff?  Anyone can get C. diff. But you are more likely to get the infection if you:    Are ages 65 and older    Are taking antibiotics    Have a weak immune system because of other health problems    Have inflammatory bowel disease    Have had C. diff before    Have had gastrointestinal (GI) surgery    Work or are a patient in a hospital, clinic, or nursing home  After treatment, C. diff can come back in about 1 in 4 people. If C. diff does come back, you are at higher risk for infection again in the future.   How can I lower my chance of getting C. diff again?  Take antibiotics only when you really need them. Antibiotics don't help treat illnesses caused by viruses, such as colds and the flu. Don't ask for antibiotics from your doctor if he or she says they won't work.  When you are given antibiotics, take them exactly as your doctor tells you to. Don't take more or less than the amount prescribed (the dosage). Also don't take them for a shorter or longer time than your doctor tells you to, even if you feel better.  How can I stop the spread of C. diff?  C. diff can easily spread to other people in your home or workplace. The germs can remain on your hands after using the bathroom, then spread to any person,  surface, or object you touch. Here's how to not spread C. diff to other people:    Practice good handwashing. This is especially important after using the bathroom and before eating. Here's what to do: Wet your hands, scrub them with soap for 30 to 40 seconds, then rinse well and dry.    Wash your clothes, bed sheets, and towels in separate loads. Use hot water. Use both detergent and chlorine bleach.     Use chlorine bleach-based products to disinfect surfaces you touch often, such as table tops, light switches, door knobs, and toilet seats.    Remind others to wear gloves and to wash their hands if assisting you in the bathroom.  Don't use alcohol-based hand . They don't work against C. diff.   Date Last Reviewed: 8/1/2017 2000-2017 [employer]. [address] [place] [address]. All rights reserved. This information is not intended as a substitute for professional medical care. Always follow your healthcare professional's instructions.          Treating C. Difficile: Medicine to Prevent a Repeat Infection   Clostridium difficile (C. diff) are bacteria that can infect your large intestine. Your large intestine has millions of other bacteria. Many of them help keep you healthy. If you take an antibiotic to cure an infection, the medicine will kill the bacteria causing the infection. But it will also kill the good bacteria in your large intestine.   When these good bacteria are killed, C. diff bacteria can multiply. These bacteria release toxins in the intestine that cause symptoms such as diarrhea and belly (abdominal) pain.   To treat a C. diff infection, your healthcare provider will have you stop taking the antibiotic that caused the C. diff to multiply. You will likely take a different antibiotic to treat the C. diff. Treatment for C. diff stops the symptoms.   In some people, the symptoms come back after a short time (relapse). If you are being treated for C. diff and are at  risk for a relapse, your healthcare provider may prescribe an additional medicine. This medicine is called bezlotoxumab. It helps stop the infection and symptoms from coming back. It s given to adults ages 18 and older who are being treated for C. diff and are at high risk of having another C. diff infection.   Why is bezlotoxumab used?   After an infection of C. diff is treated, symptoms can come back weeks or months later. This may happen because the first treatment did not cure the infection. Or it may happen because you were infected again with C. diff. Getting a C. diff infection a second time is more likely if you are in places where C. diff spreads more easily, such as a hospital or nursing care facility. It can happen if your immune system is not working normally. This may be the case if you have a disease that affects the immune system or if you are an elderly adult. Or you may be taking medicine to lessen the response of your immune system. Bezlotoxumab can help prevent C. diff symptoms from coming back.   How does it work?   The medicine is a human monoclonal antibody. Antibodies are chemicals made by the immune system to fight illness. The medicine is an antibody created to work just like a person s own immune system. The medicine stops one of the toxins made by the C. diff bacteria. Bezlotoxumab does not treat the infection or kill the bacteria, so it is only used along with the antibiotic medicine used to treat C. diff.   Before your treatment   Tell your healthcare provider if any of the below apply to you:    You are pregnant or may be pregnant. This medicine hasn't been tested on pregnant women. Researchers don t yet know the effects on a baby in the womb.    You are breastfeeding. This medicine hasn't been tested on breastfeeding women. Researchers don t yet know if the medicine can show up in breastmilk.    You have congestive heart failure (CHF). Heart failure and death after treatment are more  "common in people with CHF who are treated with this medicine.    Have high blood pressure. The medicine may cause blood pressure to rise on the day of treatment.   Talk with your healthcare provider about the risks and benefits to you before treatment.   How the medicine is given   Bezlotoxumab is a liquid medicine that is given through an IV line into a vein. A healthcare provider will put a needle into a vein in your arm or hand. A thin, flexible tube (catheter) is then put into the vein. The medicine drips slowly through the tube into your vein. It takes about 1 hour to complete the treatment. You get this treatment just one time while you are taking the antibiotics.   Side effects   Possible side effects on the day of treatment can include:    Nausea    Headache    Fever    Dizzy feeling    Feeling short of breath    Tiredness    High blood pressure   Possible side effects within 4 weeks of treatment can include:    Nausea    Fever    Headache   Tell your healthcare providers if you have any other side effects not listed here.  Date Last Reviewed: 1/1/2017 2000-2017 [employer]. [address] [place] [address]. All rights reserved. This information is not intended as a substitute for professional medical care. Always follow your healthcare professional's instructions.          Bacterial Diarrhea (Adult)    Gastroenteritis is another name for an infection in the intestinal tract. It is sometimes called the \"stomach flu\" but it has no connection to influenza. Although most diarrhea is caused by a virus, you have a bacterial infection. It causes diarrhea. Diarrhea is the passing of loose watery stools 3 or more times a day.  Antibiotics are often used to treat this type of infection. Sometimes it is necessary to wait until a stool culture is complete before an antibiotic can be chosen. This may take about 2 days. Certain types of gastroenteritis should not be treated with antibiotics. " Such treatment can lead to other problems. Because of this, do not take antibiotics without your healthcare provider's recommendation.   Along with diarrhea, you may have the following symptoms:    Abdominal pain and cramping    Nausea and vomiting    Loss of bowel control    Fever and chills    Bloody stools  The main danger of diarrhea is dehydration. Dehydration is the loss of too much water and other fluids from the body without taking in enough fluids to replace it. When this occurs, body fluids must be replaced with oral rehydration solutions. These solutions are available at drug stores and most grocery stores without a prescription.   Home care  Medicine    If antibiotics were prescribed, be sure you take them as directed  until they are finished.    You may use acetaminophen, or NSAIDs such as ibuprofen  to control fever unless another medicine was prescribed. If you have chronic liver or kidney disease or ever had a stomach ulcer or gastrointestinal bleeding, talk with your healthcare provider before using these medicines. Aspirin should never be used in anyone under 18 years of age who is ill with a fever. It may cause severe liver damage. Don't increase your use of NSAID medicines if you are already taking the medicine for another condition (such as arthritis). Don't use NSAIDs if you are on daily aspirin therapy (such as for heart disease or after stroke).    Do not take over-the-counter anti-diarrheal medicines, unless advised by your doctor. They  can make your bacterial diarrhea worse.  Prevent spread of the illness    If symptoms are severe, rest at home for the next 24 hours or until you are feeling better.    Wash your hands with soap and water or use alcohol-based  after touching anyone who is sick, after using the toilet, and before meals.    Clean the toilet after each use.    Do not prepare or serve food to others.  Diet    Water and clear liquids, soft drinks without caffeine,  gingerale, mineral water, decaff tea and coffee are important to prevent dehydration. Drink small amounts at a time, do not guzzle it. Sports drinks are not a good choice because they have too much sugar and not enough electrolytes. Commercially available oral rehydration solutions are best.    If you eat, avoid fatty, greasy, spicy, or fried foods.    Avoid dairy products if having diarrhea, as they can make diarrhea worse.    Caffeine, tobacco, and alcohol can make the diarrhea, cramping, and pain worse. Remember, caffeine is in coffee, chocolate, som, some energy drinks, and teas.    Do not force yourself  to eat, especially if having cramping, vomiting, or diarrhea. Do not eat large amounts at a time, even if you are hungry  During the first 24 Hours (the first full day) follow the diet below    Beverages: Water, clear liquids, soft drinks without caffeine; gingerale, mineral water (plain or flavored), decaffeinated tea and coffee. Avoid sports drinks.    Soups: Clear broth, consommé and bouillon    Desserts: Plain gelatin, popsicles and fruit juice bars.  During the next 24 hours (the second day) you may add the following to the above if you have improved    Hot cereal, plain toast, bread, rolls, crackers    Plain noodles, rice, mashed potatoes, chicken noodle or rice soup    Unsweetened canned fruit (avoid pineapple), bananas    Limit fat intake to less than 15 grams per day by avoiding margarine, butter, oils, mayonnaise, sauces, gravies, fried foods, peanut butter, meat, poultry and fish.    Limit dairy.    Limit fiber; avoid raw or cooked vegetables, fresh fruits (except bananas) and bran cereals.    Limit caffeine and chocolate. No spices or seasonings except salt.  During the next 24 hours    Gradually resume a normal diet, as you feel better and your symptoms lessen.    If at any time your symptoms get worse, go back to clear liquids until you feel better.  Follow-up care  Follow up with your  healthcare provider as advised. Call if you are not improving within 24 hours or if the diarrhea lasts more than one week on antibiotics. If a stool (diarrhea) sample was taken, you may call in 2 days (or as directed) for the results.  When to seek medical advice  Call your healthcare provider right away if any of the following occur:    Increasing abdominal pain or constant lower right abdominal pain    Continued vomiting (unable to keep liquids down)    Frequent diarrhea (more than 5 times a day)    Blood in vomit or stool (black or red color)    Reduced oral intake    Dark urine, reduced urine output    Weakness, dizziness    Drowsiness    Fever of 100.4 F (38 C) or higher, or as directed by your healthcare provider    New rash  Call 911  Call 911 if any of the following occur:    Trouble breathing    Confused    Severe drowsiness or trouble awakening    Fainting or loss of consciousness    Rapid heart rate    Seizure    Stiff neck  Date Last Reviewed: 11/16/2015 2000-2017 [employer]. [address] [place] [address], [place] [address]. All rights reserved. This information is not intended as a substitute for professional medical care. Always follow your healthcare professional's instructions.

## 2018-05-07 NOTE — ED NOTES
Chaperoned for rectal exam with Dr Ford. Patient with 5th pasty stool today, collected and sent to lab. Cleaned and moises-care done. Diaper off for now, lying on chux. Rectal area reddened, small soft pea sized raised area, possible lipoma, on right side of rectal area is not reddened or excoriated. Patient complains of tenderness with cleaning of reddened rectal area. Also with some abdominal cramping.

## 2018-05-07 NOTE — ED AVS SNAPSHOT
[facility] Emergency Department    [address] [place] [address]    Phone:  [phone]    Fax:  [phone]                                       Cricket Miguel   MRN: [id number]    Department:  [facility] Emergency Department   Date of Visit:  5/7/2018           Patient Information     Date Of Birth          1955        Your diagnoses for this visit were:     Clostridium difficile diarrhea        You were seen by Rosemary Ford MD.        Discharge Instructions         What Is C. diff?  C. diff is an infection caused by Clostridium difficile (C. diff) bacteria. These are germs that live in the part of your belly called your colon, or large intestine. They don't usually cause problems, but if the normal balance of good and bad bacteria in your colon changes, C. diff bacteria can grow out of control and lead to infection. This can harm your colon and cause diarrhea and belly pain.  What are the symptoms of C. diff?  Some people with C. diff have no symptoms, but they can still pass the infection to others. Symptoms can include:    Watery diarrhea    Fever    Belly pain and cramping    Nausea and vomiting    Loss of appetite and weight loss   Who is most likely to get C. diff?  Anyone can get C. diff. But you are more likely to get the infection if you:    Are ages 65 and older    Are taking antibiotics    Have a weak immune system because of other health problems    Have inflammatory bowel disease    Have had C. diff before    Have had gastrointestinal (GI) surgery    Work or are a patient in a hospital, clinic, or nursing home  After treatment, C. diff can come back in about 1 in 4 people. If C. diff does come back, you are at higher risk for infection again in the future.   How can I lower my chance of getting C. diff again?  Take antibiotics only when you really need them. Antibiotics don't help treat illnesses caused by viruses, such as colds and the flu.  Don't ask for antibiotics from your doctor if he or she says they won't work.  When you are given antibiotics, take them exactly as your doctor tells you to. Don't take more or less than the amount prescribed (the dosage). Also don't take them for a shorter or longer time than your doctor tells you to, even if you feel better.  How can I stop the spread of C. diff?  C. diff can easily spread to other people in your home or workplace. The germs can remain on your hands after using the bathroom, then spread to any person, surface, or object you touch. Here's how to not spread C. diff to other people:    Practice good handwashing. This is especially important after using the bathroom and before eating. Here's what to do: Wet your hands, scrub them with soap for 30 to 40 seconds, then rinse well and dry.    Wash your clothes, bed sheets, and towels in separate loads. Use hot water. Use both detergent and chlorine bleach.     Use chlorine bleach-based products to disinfect surfaces you touch often, such as table tops, light switches, door knobs, and toilet seats.    Remind others to wear gloves and to wash their hands if assisting you in the bathroom.  Don't use alcohol-based hand . They don't work against C. diff.   Date Last Reviewed: 8/1/2017 2000-2017 The [employer]. [address] [place], [place] [id number]. All rights reserved. This information is not intended as a substitute for professional medical care. Always follow your healthcare professional's instructions.          Treating C. Difficile: Medicine to Prevent a Repeat Infection   Clostridium difficile (C. diff) are bacteria that can infect your large intestine. Your large intestine has millions of other bacteria. Many of them help keep you healthy. If you take an antibiotic to cure an infection, the medicine will kill the bacteria causing the infection. But it will also kill the good bacteria in your large intestine.   When these good  bacteria are killed, C. diff bacteria can multiply. These bacteria release toxins in the intestine that cause symptoms such as diarrhea and belly (abdominal) pain.   To treat a C. diff infection, your healthcare provider will have you stop taking the antibiotic that caused the C. diff to multiply. You will likely take a different antibiotic to treat the C. diff. Treatment for C. diff stops the symptoms.   In some people, the symptoms come back after a short time (relapse). If you are being treated for C. diff and are at risk for a relapse, your healthcare provider may prescribe an additional medicine. This medicine is called bezlotoxumab. It helps stop the infection and symptoms from coming back. It s given to adults ages 18 and older who are being treated for C. diff and are at high risk of having another C. diff infection.   Why is bezlotoxumab used?   After an infection of C. diff is treated, symptoms can come back weeks or months later. This may happen because the first treatment did not cure the infection. Or it may happen because you were infected again with C. diff. Getting a C. diff infection a second time is more likely if you are in places where C. diff spreads more easily, such as a hospital or nursing care facility. It can happen if your immune system is not working normally. This may be the case if you have a disease that affects the immune system or if you are an elderly adult. Or you may be taking medicine to lessen the response of your immune system. Bezlotoxumab can help prevent C. diff symptoms from coming back.   How does it work?   The medicine is a human monoclonal antibody. Antibodies are chemicals made by the immune system to fight illness. The medicine is an antibody created to work just like a person s own immune system. The medicine stops one of the toxins made by the C. diff bacteria. Bezlotoxumab does not treat the infection or kill the bacteria, so it is only used along with the  antibiotic medicine used to treat C. diff.   Before your treatment   Tell your healthcare provider if any of the below apply to you:    You are pregnant or may be pregnant. This medicine hasn't been tested on pregnant women. Researchers don t yet know the effects on a baby in the womb.    You are breastfeeding. This medicine hasn't been tested on breastfeeding women. Researchers don t yet know if the medicine can show up in breastmilk.    You have congestive heart failure (CHF). Heart failure and death after treatment are more common in people with CHF who are treated with this medicine.    Have high blood pressure. The medicine may cause blood pressure to rise on the day of treatment.   Talk with your healthcare provider about the risks and benefits to you before treatment.   How the medicine is given   Bezlotoxumab is a liquid medicine that is given through an IV line into a vein. A healthcare provider will put a needle into a vein in your arm or hand. A thin, flexible tube (catheter) is then put into the vein. The medicine drips slowly through the tube into your vein. It takes about 1 hour to complete the treatment. You get this treatment just one time while you are taking the antibiotics.   Side effects   Possible side effects on the day of treatment can include:    Nausea    Headache    Fever    Dizzy feeling    Feeling short of breath    Tiredness    High blood pressure   Possible side effects within 4 weeks of treatment can include:    Nausea    Fever    Headache   Tell your healthcare providers if you have any other side effects not listed here.  Date Last Reviewed: 1/1/2017 2000-2017 The [employer]. [address] [place] [address]. All rights reserved. This information is not intended as a substitute for professional medical care. Always follow your healthcare professional's instructions.          Bacterial Diarrhea (Adult)    Gastroenteritis is another name for an infection in the  "intestinal tract. It is sometimes called the \"stomach flu\" but it has no connection to influenza. Although most diarrhea is caused by a virus, you have a bacterial infection. It causes diarrhea. Diarrhea is the passing of loose watery stools 3 or more times a day.  Antibiotics are often used to treat this type of infection. Sometimes it is necessary to wait until a stool culture is complete before an antibiotic can be chosen. This may take about 2 days. Certain types of gastroenteritis should not be treated with antibiotics. Such treatment can lead to other problems. Because of this, do not take antibiotics without your healthcare provider's recommendation.   Along with diarrhea, you may have the following symptoms:    Abdominal pain and cramping    Nausea and vomiting    Loss of bowel control    Fever and chills    Bloody stools  The main danger of diarrhea is dehydration. Dehydration is the loss of too much water and other fluids from the body without taking in enough fluids to replace it. When this occurs, body fluids must be replaced with oral rehydration solutions. These solutions are available at drug stores and most grocery stores without a prescription.   Home care  Medicine    If antibiotics were prescribed, be sure you take them as directed  until they are finished.    You may use acetaminophen, or NSAIDs such as ibuprofen  to control fever unless another medicine was prescribed. If you have chronic liver or kidney disease or ever had a stomach ulcer or gastrointestinal bleeding, talk with your healthcare provider before using these medicines. Aspirin should never be used in anyone under 18 years of age who is ill with a fever. It may cause severe liver damage. Don't increase your use of NSAID medicines if you are already taking the medicine for another condition (such as arthritis). Don't use NSAIDs if you are on daily aspirin therapy (such as for heart disease or after stroke).    Do not take " over-the-counter anti-diarrheal medicines, unless advised by your doctor. They  can make your bacterial diarrhea worse.  Prevent spread of the illness    If symptoms are severe, rest at home for the next 24 hours or until you are feeling better.    Wash your hands with soap and water or use alcohol-based  after touching anyone who is sick, after using the toilet, and before meals.    Clean the toilet after each use.    Do not prepare or serve food to others.  Diet    Water and clear liquids, soft drinks without caffeine, gingerale, mineral water, decaff tea and coffee are important to prevent dehydration. Drink small amounts at a time, do not guzzle it. Sports drinks are not a good choice because they have too much sugar and not enough electrolytes. Commercially available oral rehydration solutions are best.    If you eat, avoid fatty, greasy, spicy, or fried foods.    Avoid dairy products if having diarrhea, as they can make diarrhea worse.    Caffeine, tobacco, and alcohol can make the diarrhea, cramping, and pain worse. Remember, caffeine is in coffee, chocolate, som, some energy drinks, and teas.    Do not force yourself  to eat, especially if having cramping, vomiting, or diarrhea. Do not eat large amounts at a time, even if you are hungry  During the first 24 Hours (the first full day) follow the diet below    Beverages: Water, clear liquids, soft drinks without caffeine; gingerale, mineral water (plain or flavored), decaffeinated tea and coffee. Avoid sports drinks.    Soups: Clear broth, consommé and bouillon    Desserts: Plain gelatin, popsicles and fruit juice bars.  During the next 24 hours (the second day) you may add the following to the above if you have improved    Hot cereal, plain toast, bread, rolls, crackers    Plain noodles, rice, mashed potatoes, chicken noodle or rice soup    Unsweetened canned fruit (avoid pineapple), bananas    Limit fat intake to less than 15 grams per day by  avoiding margarine, butter, oils, mayonnaise, sauces, gravies, fried foods, peanut butter, meat, poultry and fish.    Limit dairy.    Limit fiber; avoid raw or cooked vegetables, fresh fruits (except bananas) and bran cereals.    Limit caffeine and chocolate. No spices or seasonings except salt.  During the next 24 hours    Gradually resume a normal diet, as you feel better and your symptoms lessen.    If at any time your symptoms get worse, go back to clear liquids until you feel better.  Follow-up care  Follow up with your healthcare provider as advised. Call if you are not improving within 24 hours or if the diarrhea lasts more than one week on antibiotics. If a stool (diarrhea) sample was taken, you may call in 2 days (or as directed) for the results.  When to seek medical advice  Call your healthcare provider right away if any of the following occur:    Increasing abdominal pain or constant lower right abdominal pain    Continued vomiting (unable to keep liquids down)    Frequent diarrhea (more than 5 times a day)    Blood in vomit or stool (black or red color)    Reduced oral intake    Dark urine, reduced urine output    Weakness, dizziness    Drowsiness    Fever of 100.4 F (38 C) or higher, or as directed by your healthcare provider    New rash  Call 911  Call 911 if any of the following occur:    Trouble breathing    Confused    Severe drowsiness or trouble awakening    Fainting or loss of consciousness    Rapid heart rate    Seizure    Stiff neck  Date Last Reviewed: 11/16/2015 2000-2017 [employer]. [address] [place] [address]. All rights reserved. This information is not intended as a substitute for professional medical care. Always follow your healthcare professional's instructions.          Your next 10 appointments already scheduled     May 08, 2018 11:30 AM CDT   Neuro Treatment with Mariam Gamez, SLP   [facility] ([facility])    [address]  [facility] [address]   [phone]            May 11, 2018 11:30 AM CDT   Neuro Treatment with HOLLY Bledsoe   [facility] Therapy ([facility])    [address]  [facility] [address]   [phone]            May 18, 2018  2:45 PM CDT   Neuro Treatment with HOLLY Bledsoe   [facility] Therapy ([facility])    [address] [facility] [address]   [phone]            May 22, 2018  9:15 AM CDT   Neuro Treatment with HOLLY Bledsoe   [facility] ([facility])    [address] [facility] [address]   [phone]            May 25, 2018 11:30 AM CDT   Neuro Treatment with HOLLY Bledsoe   [facility] ([facility])    [address] [facility] [address]   [phone]            May 29, 2018  2:00 PM CDT   Neuro Treatment with HOLLY Bledsoe   [facility] ([facility])    [address] [facility] [address]   [phone]            Jun 06, 2018 11:15 AM CDT   Neuro Treatment with Chelsey Padilla, SLP   [facility] ([facility])    [address] [facility] [facility] [address]   [phone]            Jun 07, 2018 11:30 AM CDT   Neuro Treatment with HOLLY Bledsoe   [facility] ([facility])    [address] [facility] [address]   [phone]            Jun 08, 2018  2:00 PM CDT   Neuro Treatment with HOLLY Bledsoe   [facility] ([facility])    [address] [facility] [address]   [phone]            Jun 12, 2018 11:30 AM CDT   Neuro Treatment with HOLLY Bledsoe   [facility] Therapy ([facility])    [address] [facility] [address]   [phone]              24 Hour  Appointment Hotline       To make an appointment at any [facility], call [phone] ([phone]). If you don't have a family doctor or clinic, we will help you find one. [facility] are conveniently located to serve the needs of you and your family.             Review of your medicines      START taking        Dose / Directions Last dose taken    HYDROcodone-acetaminophen 5-325 MG per tablet   Commonly known as:  NORCO   Dose:  1 tablet   Quantity:  8 tablet        Take 1 tablet by mouth every 6 hours as needed for severe pain maximum 6 tablet(s) per day   Refills:  0        vancomycin 50 mg/mL Liqd solution   Commonly known as:  VANOCIN   Dose:  125 mg   Quantity:  100 mL        Take 2.5 mLs (125 mg) by mouth 4 times daily for 14 days   Refills:  0          Our records show that you are taking the medicines listed below. If these are incorrect, please call your family doctor or clinic.        Dose / Directions Last dose taken    aspirin 81 MG chewable tablet   Dose:  81 mg   Quantity:  60 tablet        1 tablet (81 mg) by Oral or Feeding Tube route daily   Refills:  1        atorvastatin 40 MG tablet   Commonly known as:  LIPITOR   Dose:  40 mg   Quantity:  60 tablet        Take 1 tablet (40 mg) by mouth daily   Refills:  1        bisacodyl 5 MG EC tablet   Commonly known as:  DULCOLAX   Dose:  5 mg        Take 5 mg by mouth 2 times daily as needed for constipation   Refills:  0        bumetanide 1 MG tablet   Commonly known as:  BUMEX   Quantity:  90 tablet        TAKE 1 TABLET BY MOUTH DAILY   Refills:  1        Carboxymethylcellulose Sod PF 0.5 % Soln ophthalmic solution   Commonly known as:  REFRESH PLUS   Dose:  1 drop   Quantity:  1 Bottle        Place 1 drop Into the left eye 3 times daily as needed (to flush debris from eye)   Refills:  1        carvedilol 12.5 MG tablet   Commonly known as:  COREG   Quantity:  180 tablet        TAKE 1 TABLET BY MOUTH TWICE DAILY   Refills:  1         famotidine 20 MG tablet   Commonly known as:  PEPCID   Dose:  20 mg   Quantity:  60 tablet        Take 1 tablet (20 mg) by mouth 2 times daily   Refills:  1        FLUoxetine 20 MG capsule   Commonly known as:  PROzac   Dose:  20 mg   Quantity:  90 capsule        Take 1 capsule (20 mg) by mouth daily   Refills:  3        iron 325 (65 Fe) MG tablet   Dose:  1 tablet        Take 1 tablet by mouth every evening   Refills:  0        losartan 50 MG tablet   Commonly known as:  COZAAR   Quantity:  90 tablet        TAKE 1/2 TABLET BY MOUTH EVERY 12 HOURS   Refills:  3        magic mouthwash suspension   Commonly known as:  ENTER INGREDIENTS IN COMMENTS   Dose:  5-10 mL   Quantity:  240 mL        Take 5-10 mLs by mouth every 4 hours as needed   Refills:  1        mirtazapine 7.5 MG Tabs tablet   Commonly known as:  REMERON   Dose:  22.5 mg   Quantity:  90 tablet        Take 3 tablets (22.5 mg) by mouth At Bedtime   Refills:  3        multivitamin, therapeutic with minerals Tabs tablet   Dose:  1 tablet   Quantity:  30 each        Take 1 tablet by mouth daily   Refills:  0        OLANZapine 2.5 MG tablet   Commonly known as:  zyPREXA   Quantity:  90 tablet        TAKE 1 TABLET BY MOUTH EVERY DAY   Refills:  1        polyethylene glycol powder   Commonly known as:  MIRALAX/GLYCOLAX   Dose:  17 g        Take 17 g by mouth every evening as needed for constipation   Refills:  0        Potassium Chloride ER 20 MEQ Tbcr   Dose:  20 mEq   Quantity:  60 tablet        Take 1 tablet (20 mEq) by mouth 2 times daily   Refills:  3        senna-docusate 8.6-50 MG per tablet   Commonly known as:  SENOKOT-S;PERICOLACE   Dose:  1-2 tablet   Quantity:  100 tablet        Take 1-2 tablets by mouth 2 times daily as needed for constipation   Refills:  0                Information about OPIOIDS     PRESCRIPTION OPIOIDS: WHAT YOU NEED TO KNOW   You have a prescription for an opioid (narcotic) pain medicine. Opioids can cause addiction. If you have  a history of chemical dependency of any type, you are at a higher risk of becoming addicted to opioids. Only take this medicine after all other options have been tried. Take it for as short a time and as few doses as possible.     Do not:    Drive. If you drive while taking these medicines, you could be arrested for driving under the influence (DUI).    Operate heavy machinery    Do any other dangerous activities while taking these medicines.     Drink any alcohol while taking these medicines.      Take with any other medicines that contain acetaminophen. Read all labels carefully. Look for the word  acetaminophen  or  Tylenol.  Ask your pharmacist if you have questions or are unsure.    Store your pills in a secure place, locked if possible. We will not replace any lost or stolen medicine. If you don t finish your medicine, please throw away (dispose) as directed by your pharmacist. The [place] [employer] has more information about safe disposal: https://www.pca.[facility].[place].us/living-green/managing-unwanted-medications    All opioids tend to cause constipation. Drink plenty of water and eat foods that have a lot of fiber, such as fruits, vegetables, prune juice, apple juice and high-fiber cereal. Take a laxative (Miralax, milk of magnesia, Colace, Senna) if you don t move your bowels at least every other day.         Prescriptions were sent or printed at these locations (2 Prescriptions)                   Other Prescriptions                Printed at Department/Unit printer (2 of 2)         HYDROcodone-acetaminophen (NORCO) 5-325 MG per tablet               vancomycin (VANOCIN) 50 mg/mL LIQD solution                Procedures and tests performed during your visit     Procedure/Test Number of Times Performed    Blood culture 2    CBC with platelets differential 1    CT Abdomen Pelvis w Contrast 1    Clostridium difficile toxin B PCR 1    Comprehensive metabolic panel 1    Enteric Bacteria and Virus  Panel by NORAH Stool 1    Lipase 1    UA with Microscopic reflex to Culture 1      Orders Needing Specimen Collection     None      Pending Results     Date and Time Order Name Status Description    5/7/2018 1658 Blood culture In process     5/7/2018 1655 Blood culture In process     5/7/2018 1254 Enteric Bacteria and Virus Panel by NORAH Stool In process             Pending Culture Results     Date and Time Order Name Status Description    5/7/2018 1658 Blood culture In process     5/7/2018 1655 Blood culture In process     5/7/2018 1254 Enteric Bacteria and Virus Panel by NORAH Stool In process             Pending Results Instructions     If you had any lab results that were not finalized at the time of your Discharge, you can call the ED Lab Result RN at [phone]. You will be contacted by this team for any positive Lab results or changes in treatment. The nurses are available 7 days a week from 10A to 6:30P.  You can leave a message 24 hours per day and they will return your call.        Test Results From Your Hospital Stay        5/7/2018  1:12 PM      Component Results     Component Value Ref Range & Units Status    WBC 9.9 4.0 - 11.0 10e9/L Final    RBC Count 4.85 4.4 - 5.9 10e12/L Final    Hemoglobin 13.7 13.3 - 17.7 g/dL Final    Hematocrit 43.7 40.0 - 53.0 % Final    MCV 90 78 - 100 fl Final    MCH 28.2 26.5 - 33.0 pg Final    MCHC 31.4 (L) 31.5 - 36.5 g/dL Final    RDW 14.7 10.0 - 15.0 % Final    Platelet Count 338 150 - 450 10e9/L Final    Diff Method Automated Method  Final    % Neutrophils 70.8 % Final    % Lymphocytes 15.7 % Final    % Monocytes 5.9 % Final    % Eosinophils 6.6 % Final    % Basophils 0.6 % Final    % Immature Granulocytes 0.4 % Final    Nucleated RBCs 0 0 /100 Final    Absolute Neutrophil 7.0 1.6 - 8.3 10e9/L Final    Absolute Lymphocytes 1.6 0.8 - 5.3 10e9/L Final    Absolute Monocytes 0.6 0.0 - 1.3 10e9/L Final    Absolute Eosinophils 0.7 0.0 - 0.7 10e9/L Final    Absolute Basophils  0.1 0.0 - 0.2 10e9/L Final    Abs Immature Granulocytes 0.0 0 - 0.4 10e9/L Final    Absolute Nucleated RBC 0.0  Final         5/7/2018  1:32 PM      Component Results     Component Value Ref Range & Units Status    Sodium 140 133 - 144 mmol/L Final    Potassium 4.0 3.4 - 5.3 mmol/L Final    Chloride 105 94 - 109 mmol/L Final    Carbon Dioxide 31 20 - 32 mmol/L Final    Anion Gap 4 3 - 14 mmol/L Final    Glucose 88 70 - 99 mg/dL Final    Urea Nitrogen 19 7 - 30 mg/dL Final    Creatinine 1.35 (H) 0.66 - 1.25 mg/dL Final    GFR Estimate 53 (L) >60 mL/min/1.7m2 Final    Non  GFR Calc    GFR Estimate If Black 64 >60 mL/min/1.7m2 Final    African American GFR Calc    Calcium 9.4 8.5 - 10.1 mg/dL Final    Bilirubin Total 0.4 0.2 - 1.3 mg/dL Final    Albumin 3.4 3.4 - 5.0 g/dL Final    Protein Total 8.4 6.8 - 8.8 g/dL Final    Alkaline Phosphatase 120 40 - 150 U/L Final    ALT 15 0 - 70 U/L Final    AST 20 0 - 45 U/L Final         5/7/2018  1:32 PM      Component Results     Component Value Ref Range & Units Status    Lipase 86 73 - 393 U/L Final         5/7/2018  1:09 PM         5/7/2018  4:08 PM      Component Results     Component Value Ref Range & Units Status    Specimen Description Feces  Final    C Diff Toxin B PCR Positive (A) NEG^Negative Final    Positive: Toxin producing Clostridium difficile target DNA sequences detected,   presumed positive for Clostridium difficile toxin B.  Clostridium difficile (Requires Enteric Isolation)  FDA approved assay performed using [employer] GeneXpert real-time PCR.  Critical Value/Significant Value called to and read back by  JULIAN SO RN/[facility] AT 1608 ON 05.07.18. JPG           5/7/2018  1:23 PM      Component Results     Component Value Ref Range & Units Status    Color Urine Yellow  Final    Appearance Urine Slightly Cloudy  Final    Glucose Urine Negative NEG^Negative mg/dL Final    Bilirubin Urine Negative NEG^Negative Final    Ketones Urine Negative  NEG^Negative mg/dL Final    Specific Gravity Urine 1.010 1.003 - 1.035 Final    Blood Urine Negative NEG^Negative Final    pH Urine 6.0 5.0 - 7.0 pH Final    Protein Albumin Urine Negative NEG^Negative mg/dL Final    Urobilinogen mg/dL 0.0 0.0 - 2.0 mg/dL Final    Nitrite Urine Negative NEG^Negative Final    Leukocyte Esterase Urine Negative NEG^Negative Final    Source Midstream Urine  Final    WBC Urine 1 0 - 5 /HPF Final    RBC Urine 1 0 - 2 /HPF Final    Squamous Epithelial /HPF Urine <1 0 - 1 /HPF Final    Mucous Urine Present (A) NEG^Negative /LPF Final    Hyaline Casts 4 (H) 0 - 2 /LPF Final         5/7/2018  4:23 PM      Narrative     CT ABDOMEN AND PELVIS WITH CONTRAST  5/7/2018 3:55 PM     HISTORY: Abdominal pain, diarrhea.    TECHNIQUE: Volumetric acquisition through abdomen and pelvis with  IV  contrast. 92mL Isovue-370  Radiation dose for this scan was reduced  using automated exposure control, adjustment of the mA and/or kV  according to patient size, or iterative reconstruction technique.    COMPARISON: Chest CT dated 3/10/2018 and a previous chest CT  12/17/2017.    FINDINGS: Cholelithiasis. Small benign-appearing liver cysts. The  spleen, pancreas, adrenal glands and kidneys demonstrate no worrisome  findings. Low-attenuation subcentimeter renal lesion(s). These are  compatible with small benign cysts and no specific imaging evaluation  or follow-up is recommended. Cortical scarring upper left kidney. No  hydronephrosis.    Large amount of stool in the colon, particularly the rectum which is  mildly distended with stool. Urinary bladder is also mildly distended.  No bowel obstruction, free air or other acute findings in the abdomen.  Incidentally noted are postoperative changes of the ascending aorta.  There is a rind of relative low density surrounding the aortic root  most consistent with resolving hematoma. This is smaller and less  dense than on the previous study and is much smaller than on the  2017  study when this collection also enhanced with contrast.        Impression     IMPRESSION:  1. Large amount of stool distending the rectum. Additional moderate  stool throughout the colon. No bowel obstruction.  2. Mildly distended urinary bladder.  3. Cholelithiasis.  4. Partially visualized low density thickened area surrounding the  ascending aorta compatible with resolving hematoma. Previously there  was a larger collection at this site which filled with contrast.    RHODA ALONZO MD         5/7/2018  5:47 PM         5/7/2018  5:25 PM                Clinical Quality Measure: Blood Pressure Screening     Your blood pressure was checked while you were in the emergency department today. The last reading we obtained was  BP: 129/87 . Please read the guidelines below about what these numbers mean and what you should do about them.  If your systolic blood pressure (the top number) is less than 120 and your diastolic blood pressure (the bottom number) is less than 80, then your blood pressure is normal. There is nothing more that you need to do about it.  If your systolic blood pressure (the top number) is 120-139 or your diastolic blood pressure (the bottom number) is 80-89, your blood pressure may be higher than it should be. You should have your blood pressure rechecked within a year by a primary care provider.  If your systolic blood pressure (the top number) is 140 or greater or your diastolic blood pressure (the bottom number) is 90 or greater, you may have high blood pressure. High blood pressure is treatable, but if left untreated over time it can put you at risk for heart attack, stroke, or kidney failure. You should have your blood pressure rechecked by a primary care provider within the next 4 weeks.  If your provider in the emergency department today gave you specific instructions to follow-up with your doctor or provider even sooner than that, you should follow that instruction and not wait for up  to 4 weeks for your follow-up visit.        Thank you for choosing [facility]       Thank you for choosing [facility] for your care. Our goal is always to provide you with excellent care. Hearing back from our patients is one way we can continue to improve our services. Please take a few minutes to complete the written survey that you may receive in the mail after you visit with us. Thank you!        [facility] Information     [facility] gives you secure access to your electronic health record. If you see a primary care provider, you can also send messages to your care team and make appointments. If you have questions, please call your primary care clinic.  If you do not have a primary care provider, please call [phone] and they will assist you.        Care EveryWhere ID     This is your Care EveryWhere ID. This could be used by other organizations to access your [facility] medical records  [id number]        Equal Access to Services     DANIEL BILLS : [employer] Almodovar, ricarda kelly, freddie crow. So [facility] [phone].    ATENCIÓN: Si habla español, tiene a mendiola disposición servicios gratuitos de asistencia lingüística. Brandin al [phone].    We comply with applicable federal civil rights laws and [place] laws. We do not discriminate on the basis of race, color, national origin, age, disability, sex, sexual orientation, or gender identity.            After Visit Summary       This is your record. Keep this with you and show to your community pharmacist(s) and doctor(s) at your next visit.

## 2018-05-07 NOTE — ED PROVIDER NOTES
History     Chief Complaint:  Abdominal Pain and Diarrhea    The history is provided by a caregiver. History limited by: patient non verbal.      Cricket Miguel is a 63 year old male with history of CVA, CHF, COPD, and aortic insufficiency who presents with abdominal pain and diarrhea. The patient's caregiver reports that the patient has had 4 days of diarrhea and today he developed a pain in his abdomen. She is also concerned for a red sore that is on the patient's bottom that seems to have been bothering him for several days. No recent antibiotics. No history of C.diff but has been tested before. She denies any fever, blood in stool, or other medical concerns.     Allergies:  Lisinopril      Medications:    Aspirin  Lipitor  Dulcolax  Bumex  Coreg  Pepcid  Remeron  Miralax  Senokot    Past Medical History:    Abscess of aortic root   AI (aortic insufficiency)   Anxiety   Aortic root dilatation (H)   AR (aortic regurgitation)   Cardiomyopathy (H)   CHF (congestive heart failure), NYHA class II (H)   COPD, mild (H)   CVA (cerebral vascular accident) (H)   Depression   Heart murmur   Hyperlipidemia LDL goal <70   Hypertension goal BP (blood pressure) < 140/90   Inguinal hernia   left lung nodule    Major depressive disorder, single episode, moderate (H)   Psoriasis   SNHL (sensorineural hearing loss)   Tobacco use disorder     Past Surgical History:    Left knee arthroscopy  Right shoulder arthroscopy  Left shoulder arthroscopy  Hernia repair, umbilical  Herniorrhaphy inguinal   Repair aneurysm ascending aorta  Replace aortic valve  Aortic root repair  Tracheotomy percutaneous    Family History:    Brain cancer  Alzheimer disease    Social History:  Presents with caregiver   Tobacco use: Former 1.00 PPD for 35 years  Alcohol use: No  PCP: Dipak Gordillo    Marital Status:        Review of Systems   Constitutional: Negative for fever.   Gastrointestinal: Positive for abdominal pain and diarrhea. Negative for  blood in stool.   Skin: Positive for wound.   All other systems reviewed and are negative.    Physical Exam     Patient Vitals for the past 24 hrs:   BP Temp Temp src Pulse Heart Rate Resp SpO2   05/07/18 1740 - 98.5  F (36.9  C) Oral - - - -   05/07/18 1530 129/87 - - - - - 96 %   05/07/18 1227 106/87 97.7  F (36.5  C) Temporal 85 85 18 96 %      Physical Exam  General: Patient is alert and interactive when I enter the room  Head:  The scalp, face, and head appear normal  Eyes:  Conjunctivae are normal  ENT:    The nose is normal    Pinnae are normal    External acoustic canals are normal, dry mucous membranes   Neck:  Trachea midline  CV:  Pulses are normal.    Resp:  No respiratory distress   Abdomen:      Soft, LLQ tenderness, no rebound or guarding, non-distended  Musc:  Normal muscular tone    No major joint effusions  Rectal:  Green loose stool present, mild erythema surrounding rectum, soft mass palpated 2-3 cm from anus, non-indurated and non-tender    Skin:  No rash or lesions noted  Neuro: Facial droop present, right sided weakness secondary to stroke present   Psych: Awake. Alert.  Normal affect.  Appropriate interactions.    Emergency Department Course   Imaging:  Abdomen/Pelvis CT with IV contrast:  IMPRESSION:  1. Large amount of stool distending the rectum. Additional moderate stool throughout the colon. No bowel obstruction.  2. Mildly distended urinary bladder.  3. Cholelithiasis.  4. Partially visualized low density thickened area surrounding the ascending aorta compatible with resolving hematoma. Previously there was a larger collection at this site which filled with contrast.  Report per radiology.      Laboratory:  CBC:  WBC 9.9, HGB 13.7, ,  CMP: Creatinine 1.35 (H), GFR 53 (L) otherwise WNL  Lipase: 86  Blood culture #1: pending  Blood culture #2: pending    UA: Slightly Cloudy r yellow urine, Mucous Present, Hyaline Casts 4 (H) otherwise WNL    Clostridium difficile toxin B:  POSITIVE  Clostridium difficile Culture: Pending  Stool Culture: Pending    Enteric Bacteria and Virus panel by [facility] stool: all negative     Interventions:  (1542) Normal Saline, 700, mL, IV bolus  (1614) Zofran, 4 mg, IV injection    Emergency Department Course:  Nursing notes and vitals reviewed.  (1238) I performed an exam of the patient as documented above.    Blood was drawn from the patient. This was sent for laboratory testing, findings above.   Urine sample was obtained and sent for laboratory analysis, findings above.  Stool sample was obtained and sent for laboratory analysis, findings above.  The patient was sent for a CT scan while in the emergency department, findings above.    Findings and plan explained to the patient. Patient discharged home with instructions regarding supportive care, medications, and reasons to return. The importance of close follow-up was reviewed. The patient was prescribed Norco and Vancomycin.    Impression & Plan      Medical Decision Making:  Cricket Miguel is a 64 yo F with a history of AR s/p AVR, CVA, CHF, COPD and infective endocarditis who presents with diarrhea and abdominal pain.  Patient arrived with lower abdominal pain and rectal pain.  He does have a small mass near his rectum however does not seem to be an abscess as it is very soft and more likely to be a lipoma.  His stool is green and malodorous and with his recent hospitalizations I am concerned for C. difficile.  He was on a fair amount of antibiotics during that time however has not been on any antibiotics recently.  He does have some lower abdominal tenderness.  Blood work was obtained which revealed a normal white count and a mild elevation of his creatinine.  Patient was given 700 cc of fluid but was stopped due to his heart failure history.  His C. difficile returned positive and I think this is likely the cause of his diarrhea and his pain.  A CT abdomen pelvis was done which revealed a large amount of  stool in his rectum but no signs of obstruction or toxic megacolon.  Given the normal white count and a creatinine less than 1.5, patient does not meet criteria for moderate or severe C. difficile infection.  He does have a very complex medical history especially a complex infectious history so I did touch base with infectious disease.  They stated that if he has had no fevers they would recommend getting blood cultures to make sure that they are negative at this point.  They stated that they would agree with outpatient management of the C. difficile if he has had no fevers however would have very strict return precautions.  Very careful return precautions were discussed with girlfriend and patient.  At any point if he were to have fevers she needs to emergently return to the ER.  I will give him oral vancomycin to take as well as pain medication in case his pain becomes an issue however discussed the risk of constipation.  He does have a history of constipation and does have a large amount of stool in his rectum so would only take this if absolutely necessary.  The girlfriend is comfortable going home with strict return precautions and close primary care follow-up.  I encouraged her to continue her diligent care and to clean him frequently as he likely has irritation from the C. difficile stools.  There are no signs of pressure sores or ulcerations at this point.  Patient discharged.    Diagnosis:    ICD-10-CM   1. Clostridium difficile diarrhea A04.72       Disposition:  discharged to home    Discharge Medications:   Details   HYDROcodone-acetaminophen (NORCO) 5-325 MG per tablet Take 1 tablet by mouth every 6 hours as needed for severe pain maximum 6 tablet(s) per day, Disp-8 tablet, R-0, Local Print      vancomycin (VANOCIN) 50 mg/mL LIQD solution Take 2.5 mLs (125 mg) by mouth 4 times daily for 14 days, Disp-100 mL, R-0, Local Print               Rmaesh Holliday  5/7/2018   [facility] EMERGENCY  DEPARTMENT  I, Ramesh Holliday, am serving as a scribe at 12:38 PM on 5/7/2018 to document services personally performed by Rosemary Ford MD based on my observations and the provider's statements to me.       Rosemary Ford MD  05/08/18 115

## 2018-05-08 NOTE — PROGRESS NOTES
Clinic Care Coordination Contact  [facility]/Voicemail    Referral Source: ED Follow-Up  Clinical Data: Care Coordinator Outreach  Outreach attempted x 1.     Plan:  Care Coordinator will try to reach patient again in 1-2 business days.

## 2018-05-08 NOTE — TELEPHONE ENCOUNTER
Meghna, significant other, calling back to report that patient was in ER yesterday and diagnosed with c.diff.  She wants to know when TS would like to see him for follow up.  She knows the c.diff is really contagious.  She said she will be leaving town on 5/17 and returning on 5/21.      She said also they were supposed to review lab results yesterday and she would like recommendations on the CK, CRP, sed rate and BNP result.    Meghna can be reached at [phone], CTC is on file.    RANJEET Crow, RN, N  [facility]) [phone]

## 2018-05-08 NOTE — LETTER
Health Care Home - Access Care Plan    About Me  Patient Name:  Cricket Miguel    YOB: 1955  Age:                             63 year old   Arianna MRN:            [id number] Telephone Information:     Home Phone [phone]   Mobile [phone]       Address:    [address] [place] [address] Email address:  [email or web address]      Emergency Contact(s)  Name Relationship Lgl Grd Work Phone Home Phone Mobile Phone   1. VERONICA MONTANA Significant ot*  NONE [phone]   2. GRACY,* Daughter  NONE [phone]   3. ENOC, A* Daughter  none [phone]   4. EVELYNE MIGUEL Son  none [phone]             Health Maintenance: Routine Health maintenance Reviewed: Due/Overdue     My Access Plan  Medical Emergency 911   Questions or concerns during clinic hours Primary Clinic Line, I will call the clinic directly: [facility] [phone]   24 Hour Appointment Line [phone] or  [phone]) (toll free)   24 Hour Nurse Line [phone] (toll free)   Questions or concerns outside clinic hours 24 Hour Appointment Line, I will call the after-hours on-call line:   [facility] [phone] or [phone]) (toll-free)   Preferred Urgent Care Other   Preferred Hospital [facility]  [phone]   Preferred Pharmacy [facility] [address] [place] [address] [address]     Behavioral Health Crisis Line The National Suicide Prevention Lifeline at 1-763.634.6497 or 917     My Care Team Members  Patient Care Team       Relationship Specialty Notifications Start End    Dipak Gordillo MD PCP - General Family Practice  4/5/18     Phone: [phone] Fax: [phone] [address]  [facility] [address]    Candice Palmer, RN Nurse Coordinator Neurology Admissions  4/20/18     Comment:  Stroke/Neuro-endovascular    Phone: [phone] Pager: [phone] Fax: [phone]              My Medical and Care Information  Problem List   Patient Active Problem List   Diagnosis     Tobacco use disorder     Hypertension goal BP (blood pressure) < 140/90     Disease of lung     Major depressive disorder, single episode, moderate (HCC)     Advance Care Planning     Psoriasis     COPD, mild (H)     CHF (congestive heart failure), NYHA class II (H)     AR (aortic regurgitation)     AI (aortic insufficiency)     Aortic root dilatation (H)     Hyperlipidemia LDL goal <70     Health Care Home     Status post coronary angiogram     Cardiomyopathy (H)     S/P AVR (aortic valve replacement)     H/O aortic root repair     Anemia     Endocarditis     Encephalopathy     Abscess of aortic root     CVA (cerebral vascular accident) (H)     Depression     Anxiety     Chronic systolic heart failure (H)     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     SNHL (sensorineural hearing loss)     Hx of aortic root repair     Prophylactic antibiotic      Current Medications and Allergies:  See printed Medication Report

## 2018-05-09 NOTE — PROGRESS NOTES
Clinic Care Coordination Contact    Clinic Care Coordination Contact  OUTREACH    Referral Information:  Referral Source: ED Follow-Up    Primary Diagnosis: GI Disorders    Chief Complaint   Patient presents with     Clinic Care Coordination - Post Hospital     RN        [place] Utilization:   Clinic Utilization  Difficulty keeping appointments:: No  Utilization    Last refreshed: 5/9/2018  4:18 AM:  No Show Count (past year) 1       Last refreshed: 5/9/2018  4:18 AM:  ED visits 5       Last refreshed: 5/9/2018  4:18 AM:  Hospital admissions 3          Current as of: 5/9/2018  4:18 AM             Clinical Concerns:  Current Medical Concerns:  Pt was seen in ER on 05/07 with diarrhea, diagnosed with C Diff.  Pt was discharged home with oral vancomycin and strict return instructions. SO Meghna also requested to discuss hospital bed.  She is wanting an electric one vs crank option, and is concerned since this wasn't listed on the order.  Recommended that she check with the agency to see what is necessary in order to do this.  Meghna verbalized understanding and will discuss further at upcoming apt. Transferred Meghna to scheduling for PCP f/u visit. No other concerns at this time.    Current Behavioral Concerns: none    Education Provided to patient: need for f/u, DME questions answered      Health Maintenance Reviewed: Due/Overdue   Clinical Pathway: None    Medication Management:  No concerns, Meghna reports compliance     Functional Status:  Dependent, Meghna does cares for pt     Living Situation:  Current living arrangement:: I live in a private home with spouse    Diet/Exercise/Sleep:  Diet:: Regular    Transportation:  Transportation concerns (GOAL):: No  Transportation means:: Family     Psychosocial:  Gnosticist or spiritual beliefs that impact treatment:: No  Mental health DX:: No  Mental health management concern (GOAL):: No     Financial/Insurance:   Financial/Insurance concerns (GOAL):: No     Resources and  Interventions:  Current Resources: none     Equipment Currently Used at Home: hospital bed, lift device, wheelchair, manual, commode, other (see comments)    Advance Care Plan/Directive  Advanced Care Plans/Directives on file:: No       Future Appointments              In 2 days Mariam Gamez SLP [facility] [place]    In 6 days Dipak Gordillo MD [facility] [place] [place]    In 1 week Mariam Gamez SLP [place] [place] [facility]    In 1 week Mariam Gamez SLP [place] [place] [facility]    In 2 weeks Mariam Gamez SLP [place] [facility]    In 2 weeks Mariam Gamez SLP [facility]    In 4 weeks Chelsey Padilla SLP [place] [facility]    In 4 weeks Mariam Gamez SLP [place] [facility]    In 1 month Raasch, Mariam, SLP [place] [facility]    In 1 month Mariam Gamez SLP [place] [facility]    In 1 month Mariam Gamez SLP [place] [facility]    In 2 months Candice Olivera APRN [facility]          Plan: No current CC needs identified.  Writer has worked with SO in the past, she will reach out if any new needs arise. Will close to clinic CC at this time.

## 2018-05-09 NOTE — TELEPHONE ENCOUNTER
Reason for Call: Request for an order or referral:    Order or referral being requested: Home Hospital Bed    Date needed: as soon as possible    Has the patient been seen by the PCP for this problem? YES    Additional comments: Pt's significant other called this morning and they are still in need of an order for a home hospital bed for the pt. Please give them a call at the number below to help them sort this out. Thank you.    Phone number Patient can be reached at:  Other phone number:  Meghna P: [phone]    Best Time:      Can we leave a detailed message on this number?  YES    Call taken on 5/9/2018 at 10:00 AM by Esperanza Cartwright

## 2018-05-09 NOTE — TELEPHONE ENCOUNTER
Called #   Telephone Information:   Mobile [phone]     Girlfriend - stated pt is doing better     abdominal cramping is minimal - pt has not been having runny stools, only loose stools.   Denies: fevers, chills sweats   Rn reviewed how to keep hydrated and good hand hygrine     Made an OV for 5/15/2018    Jane Pizarro RN, BSN  [facility]

## 2018-05-09 NOTE — TELEPHONE ENCOUNTER
Please triage current symptoms, advise 40 minute follow up in the next 1 week    Please see recent note

## 2018-05-09 NOTE — TELEPHONE ENCOUNTER
Called [facility] pt is suppose to look into the list that [facility] medical gave them ( because [facility] does not take pts insurance) pt will need to call them back to discuss where they want to go       Called # below     Girlfriend stated she did let know them the list       Jane Pizarro RN, BSN  [facility]

## 2018-05-11 NOTE — TELEPHONE ENCOUNTER
Called # below    Spouse states that patient is still not feeling better. Is still cramping, that has gotten better, but it is still cramping. Stool has gotten firmer and patient is not pooping as much.   Patient stated he does not feel like he is getting any relief.   Patient was a little nauseous this morning after the shower.     DENIES: CP, SOB, Difficulty Breathing, Dizziness/Lightheadedness, Numbness/Tingling, HA, Vision/Speech Changes, N/V, fevers, blood in stool, fainting, fall, sores, ulcerations, rectal pain    Patient does have an appointment on 05/15/2018 with Dr. Gordillo    Advised patient and patient's spouse to continue home care measures, sitz baths, warm baths, push fluids, hand hygiene.   Advised patient and patient's spouse that if new or worsening symptoms appear (reviewed new & worsening symptoms) to call the clinic or be seen in the the ER - Patient and patient's spouse stated an understanding and agreed with plan.    Message handled by Nurse Triage with [facility] - provider name: MD RADHA - agree with above..    Joycelyn Jones RN  [place] [facility]

## 2018-05-11 NOTE — TELEPHONE ENCOUNTER
Reason for Call:call back Detailed comments: they would like someone to call as he isnt feeling any better    Phone Number Patient can be reached at: Cell number on file:    Telephone Information:   Mobile [phone]       Best Time: any    Can we leave a detailed message on this number? YES    Call taken on 5/11/2018 at 1:07 PM by Sandy Sena

## 2018-05-15 NOTE — MR AVS SNAPSHOT
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 difficile colitis        Chronic systolic heart failure (H)        Cardiomyopathy, unspecified type (H)        Aortic root dilatation (H)        S/P AVR (aortic valve replacement)        Hx of aortic root repair        Hypertension goal BP (blood pressure) < 140/90        Hyperlipidemia LDL goal <70        COPD, mild (H)        Major depressive disorder, single episode, moderate (HCC)        Anxiety        Medication monitoring encounter          Care Instructions    Plan:    1. Start Miralax. Take 1 cap full.    2. Drink Apple Juice or Prune Juice to help with bowel movements.    [facility] [place]                        To reach your care team during and after hours:   [phone]  To reach our pharmacy:        [phone]    Clinic Hours                        Our clinic hours are:    Monday   7:30 am to 7:00 pm                  Tuesday through Friday 7:30 am to 5:00 pm                             Saturday   8:00 am to 12:00 pm      Sunday   Closed      Pharmacy Hours                        Our pharmacy hours are:    Monday   8:30 am to 7:00 pm       Tuesday to Friday  8:30 am to 6:00 pm                       Saturday    9:00 am to 1:00 pm              Sunday    Closed              There is also information available at our web site:  [email or web address]    If your provider ordered any lab tests and you do not receive the results within 10 business days, please call the clinic.    If you need a medication refill please contact  your pharmacy.  Please allow 2-3 business days for your refill to be completed.    Our clinic offers telephone visits and e visits.  Please ask one of your team members to explain more.      Use [email or web address] (secure email communication and access to your chart) to send your primary care provider a message or make an appointment. Ask someone on your Team how to sign up for [email or web address].  Immunizations                      Immunization History   Administered Date(s) Administered     Influenza Vaccine IM 3yrs+ 4 Valent IIV4 10/28/2017     Pneumococcal 23 valent 11/04/2017     TD (ADULT, 7+) 04/14/1999        Health Maintenance                         Health Maintenance Due   Topic Date Due     Heart Failure Action Plan Reviewed Every 3 Years  1955     Wellness Visit with your Primary Provider - yearly  1955     COPD ACTION PLAN Q1 YR  1955     Colon Cancer Screening - FIT Test - yearly  03/16/1963     Microalbumin Lab - yearly  02/07/2013     Depression Action Plan Review - yearly  05/06/2014     Depression Assessment - every 6 months  12/03/2016     FALL RISK ASSESSMENT  03/16/2018               Follow-ups after your visit        Follow-up notes from your care team     Return in about 2 months (around 7/15/2018), or if symptoms worsen or fail to improve, for Physical Exam.      Your next 10 appointments already scheduled     Jun 05, 2018  8:00 AM CDT   MR PROSTATE with UUMR2   [facility] [place], [facility] ([facility], [facility])    [address] [address] [phone]           Take your medicines as usual, unless your doctor tells you not to. Bring a list of your current medicines to your exam (including vitamins, minerals and over-the-counter drugs).  You may or may not receive intravenous (IV) contrast for this exam pending the discretion of the Radiologist.  You do not need to do anything special to prepare.  The MRI machine uses a strong magnet.  Please wear clothes without metal (snaps, zippers). A sweatsuit works well, or we may give you a hospital gown.  Please remove any body piercings and hair extensions before you arrive. You will also remove watches, jewelry, hairpins, wallets, dentures, partial dental plates and hearing aids. You may wear contact lenses, and you may be able to wear your rings. We have a safe place to keep your personal items, but it is safer to leave them at home.  **IMPORTANT** THE INSTRUCTIONS BELOW ARE ONLY FOR THOSE PATIENTS WHO HAVE BEEN PRESCRIBED SEDATION OR GENERAL ANESTHESIA DURING THEIR MRI PROCEDURE:  IF YOUR DOCTOR PRESCRIBED ORAL SEDATION (take medicine to help you relax during your exam):   You must get the medicine from your doctor (oral medication) before you arrive. Bring the medicine to the exam. Do not take it at home. You ll be told when to take it upon arriving for your exam.   Arrive one hour early. Bring someone who can take you home after the test. Your medicine will make you sleepy. After the exam, you may not drive, take a bus or take a taxi by yourself.  IF YOUR DOCTOR PRESCRIBED IV SEDATION:   Arrive one hour early. Bring someone who can take you home after the test. Your medicine will make you sleepy. After the exam, you may not drive, take a bus or take a taxi by yourself.   No eating 6 hours before your exam. You may have clear liquids up until 4 hours before your exam. (Clear liquids include water, clear tea, black coffee and fruit juice without pulp.)  IF YOUR DOCTOR PRESCRIBED ANESTHESIA (be asleep for your exam):   Arrive 1 1/2 hours early. Bring someone who can take you home after the test. You may not drive, take a bus or take a taxi by yourself.   No eating 8 hours before your exam. You may have clear liquids up until 4 hours before your exam. (Clear liquids include water, clear tea, black coffee and fruit juice without pulp.)   You will spend four to five hours in the recovery room.  Please call  the Imaging Department at your exam site with any questions.            Jun 06, 2018 11:15 AM CDT   Neuro Treatment with Chelsey Padilla, SLP   [facility] ([facility])    [address] [facility] [address]   [phone]            Jun 07, 2018 11:30 AM CDT   Neuro Treatment with Mariam Gamez, SLP   [facility] ([facility])    [address] [address]   [phone]            Jun 08, 2018  2:00 PM CDT   Neuro Treatment with HOLLY Bledsoe   [facility] ([facility])    [address] [address]   [phone]            Jun 12, 2018 11:30 AM CDT   Neuro Treatment with HOLLY Bledsoe   [facility] ([facility])    [address] [address]   [phone]            Jun 13, 2018 11:15 AM CDT   Neuro Treatment with HOLLY Bledsoe   [facility] ([facility])    [address] [address]   [phone]            Jul 09, 2018  1:10 PM CDT   Core Return with BIBI Bettencourt CNP   [facility] ([facility])    [address]  [facility] [id number] [phone]              Who to contact     If you have questions or need follow up information about today's clinic visit or your schedule please contact [facility] directly at [phone].  Normal or non-critical lab and imaging results will be communicated to you by MyChart, letter or phone within 4 business days after the clinic has received the results. If you do not hear from us within 7 days, please contact the clinic through MyChart or phone. If you have a critical or abnormal lab result, we will notify you by phone as soon as possible.  Submit refill requests through [employer] or call your pharmacy  "and they will forward the refill request to us. Please allow 3 business days for your refill to be completed.          Additional Information About Your Visit        statusboomhart Information     [employer] gives you secure access to your electronic health record. If you see a primary care provider, you can also send messages to your care team and make appointments. If you have questions, please call your primary care clinic.  If you do not have a primary care provider, please call [phone] and they will assist you.        Care EveryWhere ID     This is your Care EveryWhere ID. This could be used by other organizations to access your [facility] medical records  [id number]        Your Vitals Were     Pulse Temperature Height Pulse Oximetry BMI (Body Mass Index)       69 98  F (36.7  C) (Oral) 5' 8\" (1.727 m) 93% 27.67 kg/m2        Blood Pressure from Last 3 Encounters:   05/25/18 128/64   05/15/18 104/66   05/07/18 129/87    Weight from Last 3 Encounters:   05/25/18 178 lb (80.7 kg)   05/15/18 182 lb (82.6 kg)   03/13/18 182 lb (82.6 kg)              We Performed the Following     DEPRESSION ACTION PLAN (DAP)          Today's Medication Changes          These changes are accurate as of 5/15/18 11:59 PM.  If you have any questions, ask your nurse or doctor.               These medicines have changed or have updated prescriptions.        Dose/Directions    famotidine 20 MG tablet   Commonly known as:  PEPCID   This may have changed:  when to take this   Used for:  Medication monitoring encounter   Changed by:  Dipak Gordillo MD        Dose:  20 mg   Take 1 tablet (20 mg) by mouth daily   Quantity:  60 tablet   Refills:  1         Stop taking these medicines if you haven't already. Please contact your care team if you have questions.     polyethylene glycol powder   Commonly known as:  MIRALAX/GLYCOLAX   Stopped by:  Dipak Gordillo MD                    Primary Care Provider Office Phone # Fax #    Dipak Gordillo [phone] " [phone]       [address] [address]        Equal Access to Services     DANIEL BILLS : Hadii aad ku hadmelindatamika Sonico, waaxda luqadaha, qaybta kaalmada jonathonalba, freddie hobson calidinesh holtjanelle cordellshiraniles waggoner. So [facility] [phone].    ATENCIÓN: Si habla español, tiene a mendiola disposición servicios gratuitos de asistencia lingüística. Llame al [phone].    We comply with applicable federal civil rights laws and [place] laws. We do not discriminate on the basis of race, color, national origin, age, disability, sex, sexual orientation, or gender identity.            Thank you!     Thank you for choosing [facility]  for your care. Our goal is always to provide you with excellent care. Hearing back from our patients is one way we can continue to improve our services. Please take a few minutes to complete the written survey that you may receive in the mail after your visit with us. Thank you!             Your Updated Medication List - Protect others around you: Learn how to safely use, store and throw away your medicines at www.disposemymeds.org.          This list is accurate as of 5/15/18 11:59 PM.  Always use your most recent med list.                   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, Cerebrovascular accident (CVA) due to other mechanism (H)       atorvastatin 40 MG tablet    LIPITOR    60 tablet    Take 1 tablet (40 mg) by mouth daily    Cerebrovascular accident (CVA) due to other mechanism (H)       bisacodyl 5 MG EC tablet    DULCOLAX     Take 5 mg by mouth 2 times daily as needed for constipation        bumetanide 1 MG tablet    BUMEX    90 tablet    TAKE 1 TABLET BY MOUTH DAILY    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, Hypertension goal BP (blood  pressure) < 140/90       Carboxymethylcellulose Sod PF 0.5 % Soln ophthalmic solution    REFRESH PLUS    1 Bottle    Place 1 drop Into the left eye 3 times daily as needed (to flush debris from eye)    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       carvedilol 12.5 MG tablet    COREG    180 tablet    TAKE 1 TABLET BY MOUTH TWICE DAILY    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, Hypertension goal BP (blood pressure) < 140/90       famotidine 20 MG tablet    PEPCID    60 tablet    Take 1 tablet (20 mg) by mouth daily    Medication monitoring encounter       FLUoxetine 20 MG capsule    PROzac    90 capsule    Take 1 capsule (20 mg) by mouth daily    Major depressive disorder, single episode, moderate (H), Anxiety       HYDROcodone-acetaminophen 5-325 MG per tablet    NORCO    8 tablet    Take 1 tablet by mouth every 6 hours as needed for severe pain maximum 6 tablet(s) per day        iron 325 (65 Fe) MG tablet      Take 1 tablet by mouth every evening        losartan 50 MG tablet    COZAAR    90 tablet    TAKE 1/2 TABLET BY MOUTH EVERY 12 HOURS    Hypertension goal BP (blood pressure) < 140/90, Congestive heart failure, NYHA class 2, unspecified congestive heart failure type (H), Cardiomyopathy, unspecified type (H)       mirtazapine 7.5 MG Tabs tablet    REMERON    90 tablet    Take 3 tablets (22.5 mg) by mouth At Bedtime    Mild single current episode of major depressive disorder (H), Anxiety       multivitamin, therapeutic with minerals Tabs tablet     30 each    Take 1 tablet by mouth daily    Cerebrovascular accident (CVA) due to other mechanism (H)       OLANZapine 2.5 MG tablet    zyPREXA    90 tablet    TAKE 1 TABLET BY MOUTH EVERY DAY    Cerebrovascular accident (CVA) due to other mechanism (H), Anxiety, Delirium due to another medical condition       Potassium Chloride ER 20 MEQ Tbcr     60 tablet    Take 1 tablet (20 mEq) by mouth 2 times daily     Hypokalemia       senna-docusate 8.6-50 MG per tablet    SENOKOT-S;PERICOLACE    100 tablet    Take 1-2 tablets by mouth 2 times daily as needed for constipation    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

## 2018-05-15 NOTE — PROGRESS NOTES
SUBJECTIVE:   Cricket Miguel is a 63 year old male who presents to clinic today for the following health issues:    ED/UC Followup:    Facility:  [facility]  Date of visit: 5/7/18  Reason for visit: C diff  Current Status: still having abdominal cramps - stools getting harder     He is taking currently taking Vancomycin. Patient's bowels have been getting more firm. The patient feels like he is constipated and notes he has not gone to the bathroom recently. The SO notes according to his CT, his intestines are full. Patient notes he wishes to go to the bathroom more frequently. The wife reports patient has trouble knowing when he has to go to the bathroom.    Patient is currently seeing PT and OT twice a week. He does this on Monday's and Wednesdays. Patient is also seeing a speech therapist in [place]. Patient has been able to walk with a harness.     Stroke/endocarditis: Patient has right sided deficits for his stroke.    Teeth: Patient is currently looking into getting dental implants/dentures.     Elevated PSA: Patient had a PSA of 8.08ug/L. SO notes she is unsure if they have Urology follow up scheduled.    Cardiology, endocarditis, s/p AVR, abscess repair: Patient has a follow up scheduled for his heart issues, Dr Garzon.    COPD: Stable    Anxiety/Depression: PHQ: 8, HILARY 5 - notes stable/improved     Htn/Lipids:    BP Readings from Last 3 Encounters:   05/25/18 128/64   05/15/18 104/66   05/07/18 129/87     Recent Labs   Lab Test  04/25/18   0941  10/13/17   0357 04/03/16 02/07/12   1214   CHOL  107   --   105*  198   HDL  39*   --   25  47   LDL  41   --   56  123   TRIG  133  265*  120  138   CHOLHDLRATIO   --    --    --   4.2     Problem list and histories reviewed & adjusted, as indicated.  Additional history: as documented    Reviewed and updated as needed this visit by clinical staff  Tobacco  Allergies  Meds  Med Hx  Surg Hx  Fam Hx  Soc Hx      Reviewed and updated as needed this visit by  Provider         BP Readings from Last 3 Encounters:   05/25/18 128/64   05/15/18 104/66   05/07/18 129/87       Wt Readings from Last 4 Encounters:   05/25/18 178 lb (80.7 kg)   05/15/18 182 lb (82.6 kg)   03/13/18 182 lb (82.6 kg)   03/10/18 182 lb 15.7 oz (83 kg)       Health Maintenance    Health Maintenance Due   Topic Date Due     HF ACTION PLAN Q3 YR  1955     WELLNESS VISIT Q1 YR  1955     COPD ACTION PLAN Q1 YR  1955     FIT Q1 YR  03/16/1963     MICROALBUMIN Q1 YEAR  02/07/2013     FALL RISK ASSESSMENT  03/16/2018       Current Problem List    Patient Active Problem List   Diagnosis     Tobacco use disorder     Hypertension goal BP (blood pressure) < 140/90     Disease of lung     Major depressive disorder, single episode, moderate (HCC)     Advance Care Planning     Psoriasis     COPD, mild (H)     CHF (congestive heart failure), NYHA class II (H)     AR (aortic regurgitation)     AI (aortic insufficiency)     Aortic root dilatation (H)     Hyperlipidemia LDL goal <70     Health Care Home     Status post coronary angiogram     Cardiomyopathy (H)     S/P AVR (aortic valve replacement)     H/O aortic root repair     Anemia     Endocarditis     Encephalopathy     Abscess of aortic root     CVA (cerebral vascular accident) (H)     Anxiety     Chronic systolic heart failure (H)     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     SNHL (sensorineural hearing loss)     Hx of aortic root repair     Prophylactic antibiotic       Past Medical History    Past Medical History:   Diagnosis Date     Abscess of aortic root 10/06/2017     AI (aortic insufficiency)     severe     Anxiety      Aortic root dilatation (H)      AR (aortic regurgitation)     severe     Cardiomyopathy (H)      CHF (congestive heart failure), NYHA class II (H)      COPD, mild (H)     spirometry 12/16     CVA (cerebral vascular accident) (H) 10/06/2017    septic emboli - MSSA - cerebellum, Left MCA, Rt  Occipital, Aphasia, Left facial paralysis, Right>Left UE/LE weakness, Left visual field cut, moderate to severe encephalopathy, cognitive dysfunction, word finding     Depression 10/06/2017     Heart murmur      Hyperlipidemia LDL goal <70 10/31/2010     Hypertension goal BP (blood pressure) < 140/90      Inguinal hernia      left lung nodule  1/29/2008     Major depressive disorder, single episode, moderate (H)      Psoriasis childhood     SNHL (sensorineural hearing loss)     Left, secondary to CVA     Tobacco use disorder        Past Surgical History    Past Surgical History:   Procedure Laterality Date     ARTHROSCOPY KNEE INCISION AND DRAINAGE Left 10/8/2017    Arthroscopic Incision and Drainage of Left Knee - Dr Munoz     HC SHOULDER ARTHROSCOPY, DX  2001    Arthroscopy, Shoulder RT Dr OSIRIS Andersen     HC SHOULDER ARTHROSCOPY, DX  1/04    LT arthroscopy Dr OSIRIS Andersen     HERNIA REPAIR, UMBILICAL  1/08    incarcerated - dr rockwell     HERNIORRHAPHY INGUINAL  12/21/2012    HERNIORRHAPHY INGUINAL;  Right Inguinal Hernia Repair with mesh ;  Surgeon: Mello Rockwell MD;  Location: [facility] OR     REPAIR ANEURYSM ASCENDING AORTA N/A 12/19/2017    REPAIR ANEURYSM ASCENDING AORTA;  REDO Median STERNOTOMY, FEMORAL CANNULATION, Lysis of adhesions, AORTIC ROOT VALVE HOMOGRAFT with 26mm x 7.0cm Cryolife Aortic valve and conduit - Dr Bowers     REPLACE VALVE AORTIC N/A 5/17/2016    REPLACE VALVE AORTIC - Dr Bowers     ROTATOR CUFF REPAIR RT/LT  11/10    Rt arthroscopy - Dr OSIRIS Andersen     SURGICAL HISTORY OF -   5/16    AVR, severe AR, aortic root repair     TRACHEOSTOMY PERCUTANEOUS  10/27/2017            Current Medications    Current Outpatient Prescriptions   Medication Sig Dispense Refill     aspirin 81 MG chewable tablet 1 tablet (81 mg) by Oral or Feeding Tube route daily 60 tablet 1     atorvastatin (LIPITOR) 40 MG tablet Take 1 tablet (40 mg) by mouth daily 60 tablet 1     bisacodyl (DULCOLAX) 5 MG EC tablet Take 5 mg  by mouth 2 times daily as needed for constipation       bumetanide (BUMEX) 1 MG tablet TAKE 1 TABLET BY MOUTH DAILY 90 tablet 1     Carboxymethylcellulose Sod PF (REFRESH PLUS) 0.5 % SOLN ophthalmic solution Place 1 drop Into the left eye 3 times daily as needed (to flush debris from eye) 1 Bottle 1     carvedilol (COREG) 12.5 MG tablet TAKE 1 TABLET BY MOUTH TWICE DAILY 180 tablet 1     famotidine (PEPCID) 20 MG tablet Take 1 tablet (20 mg) by mouth daily 60 tablet 1     Ferrous Sulfate (IRON) 325 (65 FE) MG tablet Take 1 tablet by mouth every evening       FLUoxetine (PROZAC) 20 MG capsule Take 1 capsule (20 mg) by mouth daily 90 capsule 3     HYDROcodone-acetaminophen (NORCO) 5-325 MG per tablet Take 1 tablet by mouth every 6 hours as needed for severe pain maximum 6 tablet(s) per day (Patient not taking: Reported on 5/25/2018) 8 tablet 0     losartan (COZAAR) 50 MG tablet TAKE 1/2 TABLET BY MOUTH EVERY 12 HOURS 90 tablet 3     mirtazapine (REMERON) 7.5 MG TABS tablet Take 3 tablets (22.5 mg) by mouth At Bedtime 90 tablet 3     multivitamin, therapeutic with minerals (THERA-VIT-M) TABS tablet Take 1 tablet by mouth daily 30 each 0     OLANZapine (ZYPREXA) 2.5 MG tablet TAKE 1 TABLET BY MOUTH EVERY DAY 90 tablet 1     Potassium Chloride ER 20 MEQ TBCR Take 1 tablet (20 mEq) by mouth 2 times daily 60 tablet 3     senna-docusate (SENOKOT-S;PERICOLACE) 8.6-50 MG per tablet Take 1-2 tablets by mouth 2 times daily as needed for constipation 100 tablet 0       Allergies    Allergies   Allergen Reactions     Lisinopril Swelling     One-sided facial swelling after being on lisinopril/HCTZ for one week.        Immunizations    Immunization History   Administered Date(s) Administered     Influenza Vaccine IM 3yrs+ 4 Valent IIV4 10/28/2017     Pneumococcal 23 valent 11/04/2017     TD (ADULT, 7+) 04/14/1999       Family History    Family History   Problem Relation Age of Onset     Genetic Disorder Mother      Bone plateover  "growth Agustin. shoulder surgeries     CANCER Mother      brain cancer     Alzheimer Disease Father        Social History    Social History     Social History     Marital status:      Spouse name: N/A     Number of children: 3     Years of education: 12     Occupational History     Not on file.     Social History Main Topics     Smoking status: Former Smoker     Packs/day: 1.00     Years: 35.00     Quit date: 4/1/2016     Smokeless tobacco: Never Used      Comment: 4/2016     Alcohol use No     Drug use: Yes      Comment: marijuana- daily previously     Sexual activity: Yes     Partners: Female     Other Topics Concern     Caffeine Concern Yes     3 cups     Sleep Concern No     Special Diet No     trying to watch salt     Exercise Yes     walking     Seat Belt No     Parent/Sibling W/ Cabg, Mi Or Angioplasty Before 65f 55m? No     Social History Narrative       All above reviewed and updated, all stable unless otherwise noted    Recent labs reviewed    ROS:  CONSTITUTIONAL: NEGATIVE for fever, chills, change in weight  INTEGUMENTARY/SKIN: NEGATIVE for worrisome rashes, moles or lesions  EYES: NEGATIVE for vision changes or irritation  ENT/MOUTH: NEGATIVE for ear, mouth and throat problems  RESP: NEGATIVE for significant cough or SOB  BREAST: NEGATIVE for masses, tenderness or discharge  CV: NEGATIVE for chest pain, palpitations or peripheral edema  GI: NEGATIVE for nausea, abdominal pain, heartburn, or change in bowel habits  : NEGATIVE for frequency, dysuria, or hematuria  MUSCULOSKELETAL: NEGATIVE for significant arthralgias or myalgia  NEURO: NEGATIVE for weakness, dizziness or paresthesias  ENDOCRINE: NEGATIVE for temperature intolerance, skin/hair changes  HEME: NEGATIVE for bleeding problems  PSYCHIATRIC: NEGATIVE for changes in mood or affect    OBJECTIVE:                                                    /66  Pulse 69  Temp 98  F (36.7  C) (Oral)  Ht 5' 8\" (1.727 m)  Wt 182 lb (82.6 kg)  " SpO2 93%  BMI 27.67 kg/m2  Body mass index is 27.67 kg/(m^2).  GENERAL: healthy, alert and no distress  EYES: Eyes grossly normal to inspection, extraocular movements - intact,  HENT: ear canals- normal; TMs- normal; Nose- normal; Mouth- no ulcers, no lesions  NECK: no tenderness, no adenopathy, no asymmetry, no masses, no stiffness; thyroid- normal to palpation  RESP: lungs clear to auscultation - no rales, no rhonchi, no wheezes  CV: regular rates and rhythm, normal S1 S2, no S3 or S4 and no murmur, no click or rub -  ABDOMEN: soft, no tenderness, no  hepatosplenomegaly, no masses, normal bowel sounds  MS: extremities- no gross deformities noted, no edema,   SKIN: no suspicious lesions, no rashes,   BACK: no CVA tenderness, no paralumbar tenderness  PSYCH: Alert and oriented times 3; speech- coherent , normal rate and volume; able to articulate logical thoughts, able to abstract reason, no tangential thoughts, no hallucinations or delusions, affect- normal    DIAGNOSTICS/PROCEDURES:                                                      No results found for this or any previous visit (from the past 24 hour(s)).     ASSESSMENT/PLAN:                                                        ICD-10-CM    1. 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 I39    2. H/O aortic root repair Z98.890    3. Cerebrovascular accident (CVA), unspecified mechanism (H) I63.9    4. C. difficile colitis A04.72    5. Chronic systolic heart failure (H) I50.22    6. Cardiomyopathy, unspecified type (H) I42.9    7. Aortic root dilatation (H) I77.810    8. S/P AVR (aortic valve replacement) Z95.2    9. Hx of aortic root repair Z98.890    10. Hypertension goal BP (blood pressure) < 140/90 I10    11. Hyperlipidemia LDL goal <70 E78.5    12. COPD, mild (H) J44.9    13. Major depressive disorder, single episode, moderate (HCC) F32.1 DEPRESSION ACTION PLAN (DAP)   14. Anxiety F41.9 DEPRESSION ACTION PLAN (DAP)   15.  Medication monitoring encounter Z51.81 famotidine (PEPCID) 20 MG tablet       Discussed treatment/modality options, including risk and benefits, he desired complete vancomycin, cares discussion, medication refill, start Miralax,  follow up with another visit, and referral to Urology. All diagnosis above reviewed and noted above, otherwise stable. See Infor orders for further details.     Health Maintenance Due   Topic Date Due     HF ACTION PLAN Q3 YR  1955     WELLNESS VISIT Q1 YR 1955     COPD ACTION PLAN Q1 YR 1955     FIT Q1 YR  03/16/1963     MICROALBUMIN Q1 YEAR  02/07/2013     FALL RISK ASSESSMENT  03/16/2018       Follow up with Provider - 1-3 Months     This document serves as a record of the services and decisions personally performed and made by Dipak Gordillo MD. It was created on his behalf by Ted Sanchez, a trained medical scribe. The creation of this document is based the provider's statements to the medical scribe. I have reviewed and approved this document for accuracy prior to leaving the patient care area. Dipak Gordillo MD May 15, 2018 12:25 PM     Addendum:   Patient could medically benefit from hospital bed due to the following - Patient has a medical condition which requires positioning of the body in ways not feasible with an ordinary bed.  Patient requires positioning of the body in ways not feasible with an ordinary bed in order to alleviate pain.  Patient requires the head of the bed to be elevated more than 30 degrees most of the time due to heart failure and problems with aspiration. Patient requires frequent changes in body position and has an immediate need for change in body position (at least 12 changes in body position per day) due to CVA, encephalopathy, COPD, Cardiomyopathy, weakness, CHF, aortic regurgitation.          Dipak Gordillo MD, FAAFP    [address]  [address]    [phone]  Fax

## 2018-05-15 NOTE — LETTER
My Depression Action Plan  Name: Cricket Miguel   Date of Birth 1955  Date: 5/15/2018    My doctor: Dipak Gordillo   My clinic: [address] [id number] [phone]          GREEN    ZONE   Good Control    What it looks like:     Things are going generally well. You have normal up s and down s. You may even feel depressed from time to time, but bad moods usually last less than a day.   What you need to do:  1. Continue to care for yourself (see self care plan)  2. Check your depression survival kit and update it as needed  3. Follow your physician s recommendations including any medication.  4. Do not stop taking medication unless you consult with your physician first.           YELLOW         ZONE Getting Worse    What it looks like:     Depression is starting to interfere with your life.     It may be hard to get out of bed; you may be starting to isolate yourself from others.    Symptoms of depression are starting to last most all day and this has happened for several days.     You may have suicidal thoughts but they are not constant.   What you need to do:     1. Call your care team, your response to treatment will improve if you keep your care team informed of your progress. Yellow periods are signs an adjustment may need to be made.     2. Continue your self-care, even if you have to fake it!    3. Talk to someone in your support network    4. Open up your depression survival kit           RED    ZONE Medical Alert - Get Help    What it looks like:     Depression is seriously interfering with your life.     You may experience these or other symptoms: You can t get out of bed most days, can t work or engage in other necessary activities, you have trouble taking care of basic hygiene, or basic responsibilities, thoughts of suicide or death that will not go away, self-injurious behavior.     What you need to do:  1. Call your care team  and request a same-day appointment. If they are not available (weekends or after hours) call your local crisis line, emergency room or 911.            Depression Self Care Plan / Survival Kit    Self-Care for Depression  Here s the deal. Your body and mind are really not as separate as most people think.  What you do and think affects how you feel and how you feel influences what you do and think. This means if you do things that people who feel good do, it will help you feel better.  Sometimes this is all it takes.  There is also a place for medication and therapy depending on how severe your depression is, so be sure to consult with your medical provider and/ or Behavioral Health Consultant if your symptoms are worsening or not improving.     In order to better manage my stress, I will:    Exercise  Get some form of exercise, every day. This will help reduce pain and release endorphins, the  feel good  chemicals in your brain. This is almost as good as taking antidepressants!  This is not the same as joining a gym and then never going! (they count on that by the way ) It can be as simple as just going for a walk or doing some gardening, anything that will get you moving.      Hygiene   Maintain good hygiene (Get out of bed in the morning, Make your bed, Brush your teeth, Take a shower, and Get dressed like you were going to work, even if you are unemployed).  If your clothes don't fit try to get ones that do.    Diet  I will strive to eat foods that are good for me, drink plenty of water, and avoid excessive sugar, caffeine, alcohol, and other mood-altering substances.  Some foods that are helpful in depression are: complex carbohydrates, B vitamins, flaxseed, fish or fish oil, fresh fruits and vegetables.    Psychotherapy  I agree to participate in Individual Therapy (if recommended).    Medication  If prescribed medications, I agree to take them.  Missing doses can result in serious side effects.  I understand  that drinking alcohol, or other illicit drug use, may cause potential side effects.  I will not stop my medication abruptly without first discussing it with my provider.    Staying Connected With Others  I will stay in touch with my friends, family members, and my primary care provider/team.    Use your imagination  Be creative.  We all have a creative side; it doesn t matter if it s oil painting, sand castles, or mud pies! This will also kick up the endorphins.    Witness Beauty  (AKA stop and smell the roses) Take a look outside, even in mid-winter. Notice colors, textures. Watch the squirrels and birds.     Service to others  Be of service to others.  There is always someone else in need.  By helping others we can  get out of ourselves  and remember the really important things.  This also provides opportunities for practicing all the other parts of the program.    Humor  Laugh and be silly!  Adjust your TV habits for less news and crime-drama and more comedy.    Control your stress  Try breathing deep, massage therapy, biofeedback, and meditation. Find time to relax each day.     My support system    Clinic Contact:  Phone number:    Contact 1:  Phone number:    Contact 2:  Phone number:    Catholic/:  Phone number:    Therapist:  Phone number:    Local crisis center:    Phone number:    Other community support:  Phone number:

## 2018-05-15 NOTE — PATIENT INSTRUCTIONS
Plan:    1. Start Miralax. Take 1 cap full.    2. Drink Apple Juice or Prune Juice to help with bowel movements.    [facility] [place] [facility]                        To reach your care team during and after hours:   [phone]  To reach our pharmacy:        [phone]    Clinic Hours                        Our clinic hours are:    Monday   7:30 am to 7:00 pm                  Tuesday through Friday 7:30 am to 5:00 pm                             Saturday   8:00 am to 12:00 pm      Sunday   Closed      Pharmacy Hours                        Our pharmacy hours are:    Monday   8:30 am to 7:00 pm       Tuesday to Friday  8:30 am to 6:00 pm                       Saturday    9:00 am to 1:00 pm              Sunday    Closed              There is also information available at our web site:  [email or web address]    If your provider ordered any lab tests and you do not receive the results within 10 business days, please call the clinic.    If you need a medication refill please contact your pharmacy.  Please allow 2-3 business days for your refill to be completed.    Our clinic offers telephone visits and e visits.  Please ask one of your team members to explain more.      Use [facility] (secure email communication and access to your chart) to send your primary care provider a message or make an appointment. Ask someone on your Team how to sign up for [employer].  Immunizations                      Immunization History   Administered Date(s) Administered     Influenza Vaccine IM 3yrs+ 4 Valent IIV4 10/28/2017     Pneumococcal 23 valent 11/04/2017     TD (ADULT, 7+) 04/14/1999        Health Maintenance                         Health Maintenance Due   Topic Date Due     Heart Failure Action Plan Reviewed Every 3 Years  1955     Wellness Visit with your Primary Provider - yearly  1955     COPD ACTION PLAN Q1 YR  1955     Colon Cancer Screening - FIT Test - yearly  03/16/1963     Microalbumin Lab - yearly  02/07/2013      Depression Action Plan Review - yearly  05/06/2014     Depression Assessment - every 6 months  12/03/2016     FALL RISK ASSESSMENT  03/16/2018

## 2018-05-18 NOTE — TELEPHONE ENCOUNTER
Done. at [facility] coordinator's in basket /file box.   Gave to Marge PORTER

## 2018-05-18 NOTE — TELEPHONE ENCOUNTER
Completed forms faxed back to [facility] at [phone].   Originals sent to be scanned.     Marge Heath

## 2018-05-18 NOTE — TELEPHONE ENCOUNTER
Date Forms was received: May 18, 2018    Forms received by: Fax    Last office visit: 2018    Purpose of Form:  PT/OT Orders [place] Plan of Care    When the form is due:  ASAP    How the form needs to be returned for patient:  Fax to [phone]    Form currently placed  [facility]

## 2018-05-19 NOTE — TELEPHONE ENCOUNTER
Date Forms was received: May 19, 2018    Forms received by: Mail from patient's daughterElma    Last office visit: 5/15/18    Purpose of Form:  Daughter is applying to become representative payee with the social security administration    How the form needs to be returned for patient:  Mail to [facility] - address on form    Form currently placed  [facility] [employer]

## 2018-05-22 NOTE — TELEPHONE ENCOUNTER
Reason for call:  Patient reporting a symptom    Symptom or request: Pt is still having occasional cramping and loose stools, after finishing the medication     Duration (how long have symptoms been present): Since last visit    Have you been treated for this before? Yes    Additional comments: Chary feels he should see Dr Gordillo as all the symptons arent gone completely    Phone Number patient can be reached at:  Home number on file [phone] (home)    Best Time:  anytime    Can we leave a detailed message on this number:  YES    Call taken on 5/22/2018 at 9:50 AM by Jena Garcia

## 2018-05-22 NOTE — TELEPHONE ENCOUNTER
A  Called #   Telephone Information:   Mobile [phone]     Pt spoke with RN - he would like the letter to be written for his daughter to be his payee res    Please fill form out     Jane Pizarro RN, BSN  [facility]

## 2018-05-22 NOTE — TELEPHONE ENCOUNTER
Huddled with Md RADHA - pt needs labs done - cbc wth diff, CMP, sed rate, stool cultures carline, giardia, O & P x2 ad lactoferrin     Orders placed     Called #   Telephone Information:   Mobile [phone]        Made a lab appointment       Jane Pizarro RN, BSN  [place] [facility]

## 2018-05-22 NOTE — TELEPHONE ENCOUNTER
Wife calling stating that pt has had 3 loose stools with in the last 24 hours - pt has completed the C-diff antibiotic yesterday (5/21/2018) - pt does have some abdominal cramping here and there. Nothing severe. Would like pt to be seen     Denies: fevers, chills, vomiting    Please advise if you would like pt to be seen or continue on more antibiotics?     Thank  You     Jane Pizarro RN, BSN  [place] [facility]

## 2018-05-22 NOTE — TELEPHONE ENCOUNTER
Called #   Telephone Information:   Mobile [phone]     Pt is currently doing speech therapy and will need to call back     Jane Pizarro RN, BSN  [place] [facility]

## 2018-05-25 NOTE — LETTER
5/25/2018     RE: Cricket Miguel  [address] [place] [address]     Dear Colleague,    Thank you for referring your patient, Cricket Miguel, to the [facility] [place] at [facility]. Please see a copy of my visit note below.    Cricket Miguel is a 63-year-old male who unfortunately has had several episodes of sepsis, endocarditis, aortic valve and aortic root replacement and a stroke. He also has hypertension, hearing loss, anxiety, depression, encephalopathy, anemia, cardiomyopathy, COPD, left lung nodule, congestive heart failure, hyperlipidemia and psoriasis. Surgeries have included a right shoulder repair and inguinal herniorrhaphy. He was referred to me because of an isolated PSA of 8.08. He has no family history of prostate cancer and he has no trouble voiding. He claims he's never had a digital rectal exam  Medications: Low-dose aspirin, Lipitor, Dulcolax, Bumex, Coreg, Pepcid, iron sulfate, Prozac, losartan, hydrocodone, Remeron, multivitamin, Zyprexa, potassium chloride, Senokot  Allergies: Lisinopril  Exam: Patient is present with his wife and in a wheelchair. He has facial drooping secondary to his stroke. He is alert and oriented. Rectal exam reveals a normal sphincter, no rectal mass or impaction, a benign feeling prostate, seminal vesicles not palpable  Assessment: Elevated PSA-I have no PSAs to compare in the past. I had a long discussion with the patient has wife regarding no further evaluation, MRI scan of the prostate, ultrasound and biopsies of the prostate, risks of anything invasive. He is willing to have the MRI scan of the prostate. If this is normal, no further evaluation and no further PSAs. Yearly digital rectal exam should be done with his primary care physician, however.  If the MRI is abnormal then I will need to discuss the next step with the patient and his primary care physician especially  considering his multiple other medical problems    Again, thank you for allowing me to participate in the care of your patient.      Sincerely,    Jamey Garcia MD

## 2018-05-25 NOTE — MR AVS SNAPSHOT
After Visit Summary   5/25/2018    Cricket Miguel    MRN: [id number]           Patient Information     Date Of Birth          1955        Visit Information        Provider Department      5/25/2018 3:30 PM Jamey Garcia MD [facility] [place]         Follow-ups after your visit        Your next 10 appointments already scheduled     May 29, 2018  2:00 PM CDT   Neuro Treatment with Mariam Gamez, SLP   [facility] ([facility])    [address] [address]   [phone]            Jun 06, 2018 11:15 AM CDT   Neuro Treatment with Chelsey Padilla, SLP   [facility] ([facility])    [address] [address]   [phone]            Jun 07, 2018 11:30 AM CDT   Neuro Treatment with Mariam Gamez, SLP   [facility] ([facility])    [address] [address]   [phone]            Jun 08, 2018  2:00 PM CDT   Neuro Treatment with HOLLY Bledsoe   [facility] ([facility])    [address] [address]   [phone]            Jun 12, 2018 11:30 AM CDT   Neuro Treatment with Mariam Gamez, SLP   [facility] ([facility])    [address] [address]   [phone]            Jun 13, 2018 11:15 AM CDT   Neuro Treatment with HOLLY Bledsoe   [facility] ([facility])    [address] [address]   [phone]            Jul 09, 2018  1:10 PM CDT   Core Return with BIBI [facility] ([facility])    [address]  [facility] [id number] [phone]              Who to contact     If you have questions or need follow up information about  "today's clinic visit or your schedule please contact [facility] [place] directly at [phone].  Normal or non-critical lab and imaging results will be communicated to you by Medaxionhart, letter or phone within 4 business days after the clinic has received the results. If you do not hear from us within 7 days, please contact the clinic through Medaxionhart or phone. If you have a critical or abnormal lab result, we will notify you by phone as soon as possible.  Submit refill requests through Sanera or call your pharmacy and they will forward the refill request to us. Please allow 3 business days for your refill to be completed.          Additional Information About Your Visit        Sanera Information     Sanera gives you secure access to your electronic health record. If you see a primary care provider, you can also send messages to your care team and make appointments. If you have questions, please call your primary care clinic.  If you do not have a primary care provider, please call [phone] and they will assist you.        Care EveryWhere ID     This is your Care EveryWhere ID. This could be used by other organizations to access your [facility] medical records  [id number]        Your Vitals Were     Pulse Height BMI (Body Mass Index)             68 1.702 m (5' 7\") 27.88 kg/m2          Blood Pressure from Last 3 Encounters:   05/25/18 128/64   05/15/18 104/66   05/07/18 129/87    Weight from Last 3 Encounters:   05/25/18 80.7 kg (178 lb)   05/15/18 82.6 kg (182 lb)   03/13/18 82.6 kg (182 lb)              Today, you had the following     No orders found for display       Primary Care Provider Office Phone # Fax #    Dipak Gordillo [phone]       [address] [place] [address]        Equal Access to Services     DANIEL BILLS AH: Hadii jenise kisero Nishi, waaxda luqadaha, qaybta kaalmada shantell, freddie waggoner. So wa " [phone].    ATENCIÓN: Si darwin reece, tiene a mendiola disposición servicios gratuitos de asistencia lingüística. Brandin fenton [phone].    We comply with applicable federal civil rights laws and [place] laws. We do not discriminate on the basis of race, color, national origin, age, disability, sex, sexual orientation, or gender identity.            Thank you!     Thank you for choosing [facility] [place]  for your care. Our goal is always to provide you with excellent care. Hearing back from our patients is one way we can continue to improve our services. Please take a few minutes to complete the written survey that you may receive in the mail after your visit with us. Thank you!             Your Updated Medication List - Protect others around you: Learn how to safely use, store and throw away your medicines at www.disposemymeds.org.          This list is accurate as of 5/25/18  4:03 PM.  Always use your most recent med list.                   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, Cerebrovascular accident (CVA) due to other mechanism (H)       atorvastatin 40 MG tablet    LIPITOR    60 tablet    Take 1 tablet (40 mg) by mouth daily    Cerebrovascular accident (CVA) due to other mechanism (H)       bisacodyl 5 MG EC tablet    DULCOLAX     Take 5 mg by mouth 2 times daily as needed for constipation        bumetanide 1 MG tablet    BUMEX    90 tablet    TAKE 1 TABLET BY MOUTH DAILY    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, Hypertension goal BP (blood pressure) < 140/90       Carboxymethylcellulose Sod PF 0.5 % Soln ophthalmic solution    REFRESH PLUS    1 Bottle    Place 1 drop Into the left eye 3 times daily as needed (to flush debris from eye)    Endocarditis  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 elsewhere       carvedilol 12.5 MG tablet    COREG    180 tablet    TAKE 1 TABLET BY MOUTH TWICE DAILY    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, Hypertension goal BP (blood pressure) < 140/90       famotidine 20 MG tablet    PEPCID    60 tablet    Take 1 tablet (20 mg) by mouth daily    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       FLUoxetine 20 MG capsule    PROzac    90 capsule    Take 1 capsule (20 mg) by mouth daily    Major depressive disorder, single episode, moderate (H), Anxiety       HYDROcodone-acetaminophen 5-325 MG per tablet    NORCO    8 tablet    Take 1 tablet by mouth every 6 hours as needed for severe pain maximum 6 tablet(s) per day        iron 325 (65 Fe) MG tablet      Take 1 tablet by mouth every evening        losartan 50 MG tablet    COZAAR    90 tablet    TAKE 1/2 TABLET BY MOUTH EVERY 12 HOURS    Hypertension goal BP (blood pressure) < 140/90, Congestive heart failure, NYHA class 2, unspecified congestive heart failure type (H), Cardiomyopathy, unspecified type (H)       mirtazapine 7.5 MG Tabs tablet    REMERON    90 tablet    Take 3 tablets (22.5 mg) by mouth At Bedtime    Mild single current episode of major depressive disorder (H), Anxiety       multivitamin, therapeutic with minerals Tabs tablet     30 each    Take 1 tablet by mouth daily    Cerebrovascular accident (CVA) due to other mechanism (H)       OLANZapine 2.5 MG tablet    zyPREXA    90 tablet    TAKE 1 TABLET BY MOUTH EVERY DAY    Cerebrovascular accident (CVA) due to other mechanism (H), Anxiety, Delirium due to another medical condition       Potassium Chloride ER 20 MEQ Tbcr     60 tablet    Take 1 tablet (20 mEq) by mouth 2 times daily    Hypokalemia       senna-docusate 8.6-50 MG per tablet    SENOKOT-S;PERICOLACE    100 tablet    Take 1-2 tablets by mouth 2 times daily as needed for constipation     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

## 2018-05-25 NOTE — PROGRESS NOTES
Cricket Miguel is a 63-year-old male who unfortunately has had several episodes of sepsis, endocarditis, aortic valve and aortic root replacement and a stroke. He also has hypertension, hearing loss, anxiety, depression, encephalopathy, anemia, cardiomyopathy, COPD, left lung nodule, congestive heart failure, hyperlipidemia and psoriasis. Surgeries have included a right shoulder repair and inguinal herniorrhaphy. He was referred to me because of an isolated PSA of 8.08. He has no family history of prostate cancer and he has no trouble voiding. He claims he's never had a digital rectal exam  Medications: Low-dose aspirin, Lipitor, Dulcolax, Bumex, Coreg, Pepcid, iron sulfate, Prozac, losartan, hydrocodone, Remeron, multivitamin, Zyprexa, potassium chloride, Senokot  Allergies: Lisinopril  Exam: Patient is present with his wife and in a wheelchair. He has facial drooping secondary to his stroke. He is alert and oriented. Rectal exam reveals a normal sphincter, no rectal mass or impaction, a benign feeling prostate, seminal vesicles not palpable  Assessment: Elevated PSA-I have no PSAs to compare in the past. I had a long discussion with the patient has wife regarding no further evaluation, MRI scan of the prostate, ultrasound and biopsies of the prostate, risks of anything invasive. He is willing to have the MRI scan of the prostate. If this is normal, no further evaluation and no further PSAs. Yearly digital rectal exam should be done with his primary care physician, however.  If the MRI is abnormal then I will need to discuss the next step with the patient and his primary care physician especially considering his multiple other medical problems

## 2018-05-30 PROBLEM — R97.20 ELEVATED PSA: Status: ACTIVE | Noted: 2018-01-01

## 2018-05-30 NOTE — TELEPHONE ENCOUNTER
Updated medication  list per my chart     Jane Pizarro RN, BSN  [facility]

## 2018-05-31 NOTE — TELEPHONE ENCOUNTER
TS was to [facility] bed [facility] of [facility].    [phone] is their fax.     Routing to PCP for further review/recommendations/orders.  Florence Castro RN  [facility]

## 2018-05-31 NOTE — TELEPHONE ENCOUNTER
Message handled by Nurse Triage with [facility] - provider name: MD RADHA - OK for Hospital Bed - Semi Electric Bed, [employer].    OV note from 02/19/2018 addended to include appropriate information for hospital bed  Order for DME printed, signed by MD RADHA    OV Notes from 02/19/2018, Order for DME faxed to # below    Attempt #1  Called patient's SO, Meghna, @ [phone] - Left a non-detailed message to call back and speak with any triage nurse.  Per MD RADHA -  Please advise patient's SO this has been done    Joycelyn Jones, RN  [place] Triage

## 2018-06-01 NOTE — TELEPHONE ENCOUNTER
Called # below - Meghna has been advised.   States she received 2 called yesterday and was just making sure nothing else was needed.     Joycelyn Jones RN  [facility]

## 2018-06-06 NOTE — TELEPHONE ENCOUNTER
famotidine (PEPCID) 20 MG tablet    Last Written Prescription Date:  5.15.18  Last Fill Quantity: 60,  # refills: 1   Last Office Visit: 5/15/2018   Future Office Visit:

## 2018-06-07 NOTE — TELEPHONE ENCOUNTER
"Requested Prescriptions   Pending Prescriptions Disp Refills     famotidine (PEPCID) 20 MG tablet 60 tablet 1     Sig: Take 1 tablet (20 mg) by mouth daily    H2 Blockers Protocol Passed    6/6/2018 11:48 AM       Passed - Patient is age 12 or older       Passed - Recent (12 mo) or future (30 days) visit within the authorizing provider's specialty    Patient had office visit in the last 12 months or has a visit in the next 30 days with authorizing provider or within the authorizing provider's specialty.  See \"Patient Info\" tab in inbasket, or \"Choose Columns\" in Meds & Orders section of the refill encounter.              Prescription approved per [facility] Refill Protocol.    Merary Kiran, RANJEET, RN, PHN  [facility] [phone]      "

## 2018-06-08 NOTE — TELEPHONE ENCOUNTER
Reason for Call:  Other call back    Detailed comments: Meghna calling stating still waiting for mri results and pt is very uncomfortable    Phone Number Patient can be reached at: Cell number on file:    Telephone Information:   Mobile [phone]       Best Time: anytime    Can we leave a detailed message on this number? YES    Call taken on 6/8/2018 at 12:11 PM by Tita Clancy

## 2018-06-08 NOTE — TELEPHONE ENCOUNTER
Please review with patient/SO    MRI appears benign, await Dr Garcia's recommendations    Constipation noted - check on current bowel regimen    IMPRESSION:   1. Based on the most suspicious abnormality, this exam is  characterized as PIRADS 2 - Low probability.  Clinically significant  cancer is unlikely to be present. No focally suspicious lesion is  identified in the prostate gland.  2. No evidence of extraprostatic malignancy. No suspicious adenopathy  or evidence of pelvic metastases.  3. Diffuse linear T2 hypointense stranding in the peripheral zone is  suggestive of nonspecific prostatitis.  4. Significant rectal stool burden with mild associated rectal wall  thickening and enhancement raising the question of stercoral colitis.

## 2018-06-08 NOTE — TELEPHONE ENCOUNTER
RN called patient's significant other and advised them Dr. Ryann mak with IV abx infusion prior to dental procedure. RN called IV infusion clinic at [facility] and they confirmed they would call patient to schedule infusion. RN placed orders for IV vanco and ampicillin.  .

## 2018-06-08 NOTE — TELEPHONE ENCOUNTER
Referral placed    Called patient's spouse, Meghna, @ # below  Advised of MD RADHA message below - Patient's SO stated an understanding and agreed with plan.    Joycelyn Jones RN  [place] Triage

## 2018-06-08 NOTE — TELEPHONE ENCOUNTER
"Called patient's SO, Meghna, @ # below    Advised of results below -   Spouse states that this is a chronic problem for patient - he is either constipated or has diarrhea.   Current bowel regimen - patient is not on anything currently. Has tried stool softeners, laxatives - as soon as patient starts one of these meds his stool turns the opposite of what it was doing.     Last BM was this morning, 06/08/2018 - it was soft, formed, normal. Patient normally has ~3 BM per day.   States that patient had woken up this morning and his sheets were a \"pinkish\" color underneath his butt. There was a \"significant\" amount under him this morning - was dry so happened in the middle of the night.     Last night, patient was having severe abdominal pain - normally happens shortly after patient eats. Patient feels nauseous during these times as well.     Patient does take a baby aspirin and iron supplement daily - takes in the am    Patient does have a hx of hemorrhoids    DENIES: dizziness/lightheadedness, fevers, large amounts of bright red blood or clots in stools/urine    Spouse wondering if patient should see a gastroenterologist?    Routing to PCP for further review/recommendations/orders.  Referral pended for approval    Joycelyn Jones RN  [place] Triage    "

## 2018-06-11 NOTE — TELEPHONE ENCOUNTER
Another form received from [facility] stating addendum needs to be re-addended - need to add a medical condition to the questions regarding the need for frequent and/or immediate body re-positioning.     OV from 02/19/2018 addended to add medical diagnosis.   Information faxed to [employer] - Joycelyn James @ [phone]    Joycelyn Jones RN  [facility]

## 2018-06-15 NOTE — TELEPHONE ENCOUNTER
Joycelyn calling and still needs the OV note to be addended.   She also needs to be able to see the electronic signature and date- she couldn't view this.      Dr Gordillo, Can you also fix addendum: last sentence has the word risk - please remove this ( and don't use avoid, or prevent either) Medicare has strict rules.    Triage: please print and fax so that elctronic signature and date is visible.

## 2018-06-18 NOTE — TELEPHONE ENCOUNTER
"Requested Prescriptions   Pending Prescriptions Disp Refills     KLOR-CON 20 MEQ CR tablet [Pharmacy Med Name: KLOR-CON M20 TABLET] 60 tablet 0        Last Written Prescription Date:  2.19.18  Last Fill Quantity: 60,  # refills: 3   Last Office Visit: 5/15/2018   Future Office Visit:      Sig: TAKE 1 TAB BY MOUTH TWICE DAILY    Potassium Supplements Protocol Passed    6/16/2018  2:57 PM       Passed - Recent (12 mo) or future (30 days) visit within the authorizing provider's specialty    Patient had office visit in the last 12 months or has a visit in the next 30 days with authorizing provider or within the authorizing provider's specialty.  See \"Patient Info\" tab in inbasket, or \"Choose Columns\" in Meds & Orders section of the refill encounter.           Passed - Patient is age 18 or older       Passed - Normal serum potassium in past 12 months    Recent Labs   Lab Test  05/24/18   1444   POTASSIUM  4.2                      "

## 2018-06-18 NOTE — TELEPHONE ENCOUNTER
OV from 02/19/2018 addended, again, and faxed to Samuel James @ [phone]    Joycelyn Jones RN  [facility]

## 2018-06-18 NOTE — TELEPHONE ENCOUNTER
Prescription approved per [facility] Refill Protocol.  Florence Castro RN  [facility]

## 2018-06-20 NOTE — PROGRESS NOTES
"Outpatient Speech Language Pathology Discharge Note     Patient: Cricket Miguel  : 1955    Beginning/End Dates of Reporting Period:  2018 to 2018    Referring Provider: Dr. Duy Quesada MD     Therapy Diagnosis: Severe oral motor function impairments, moderate-severe flaccid dysarthria, mild to moderate expressive/receptive aphasia.    Client Self Report: Mr. Miguel is a 63 y.o. Male who was referred for an OP SLP evaluation following CVA. This was completed on 3/16/2018, please see initial evaluation report in EPIC for further information. At this time Pt has completed 12 treatment sessions with inconsistent attendance and limited carryover/completion of recommended home exercise.     Objective Measurements: See below:        Goals:  Goal Identifier LTG 1-Kvddtnoi-Ygpsbwbmeanodsg    Goal Description Pt will demonstrate 80% accuracy for intelligiblity and word finding during a 5 minute conversation provided 0-min cues in order to communciate with family and spouse.    Target Date 18   Date Met      Progress: Goal not met, Pt is able to demonstrate 80% accuracy, but requires verbal cues and reminders to use trained strategies.      Goal Identifier STG 2-Oral Motor   Goal Description Pt will complete recommended oral motor exercises targeting lip, tongue, and cheek strength, coordination of movement and ROM provided direct models and 80% accuracy in coordination with neuromuscular stimulation to address facial paralysis.    Target Date 18   Date Met      Progress: Goal not met: \" NMES-after instruction from SLP patient able to tolerate nmes on position 4a on left side of cheek starting at 11.0mA and increasing intensity prn to 14.0 mA for 40 min., increase intensity by .5mA prn.  Pt requires maximal model, repetition, and motivation to complete oral motor -lips and cheek focus, exercises while completing nmes. Agreeable to participate in therapy close approximations of exercises using " "mirror and tactile cues.  He is able to partially complete pucker=1/10 trials, smile= 3/10 and mouth opening 40% of the time. \"     Goal Identifier STG 3-Language-Verbal Expression   Goal Description Pt will complete basic to moderate level word finding tasks with 80% accuracy provided 0-min cues in order to converse with his spouse during meals.    Target Date 07/21/18   Date Met   5/29/2018   Progress: Goal not met, Pt demonstrating % accuracy but requires mod to max verbal and phonemic cues.      Goal Identifier STG 4-Gpntbjzu-Bvlbmyo Expression   Goal Description Pt will complete basic level written expression task (writing CVC words, filling in missing letters) with 80% accuracy provided 0-min cues in order to write checks and complete financial documentation.    Target Date 07/21/18   Date Met      Progress: Goal not targeted d/t Pt non-compliance as frustration with UE weakness.      Goal Identifier STG 7-Mvwrcznr-Jdcdlcw comprehension   Goal Description Pt will complete basic level reading task (matching picture to words) with 80% accuracy in order to read food labels.    Target Date 07/21/18   Date Met      Progress: Goal not targeted d/t Pt non-compliance with visual impairments.      Goal Identifier STG 4-Bqrcjchj-Tnvkrtjw Comprehension   Goal Description Pt will complete complex level auditory comprehension tasks provided 0-min cues with 80% accuracy in order to understand information provided by his physicians at [facility].    Target Date 07/21/18   Date Met      Progress: Goal not met, limited direct targets d/t primary focus on speech and word finding.        Progress Toward Goals:   Progress limited due to lack of Pt participation, limited carry over and completion of recommended HEP, and inconsistent therapy attendance.      Plan:  Discharge from therapy.    Discharge: Yes     Reason for Discharge: Patient chooses to discontinue therapy.      Discharge Plan: Other services: Pt and family " seeking services elswhere.    Thank you for referring Cricket Miguel to outpatient therapy at [facility] in [place].  Please call Chelsey Padilla MA, SLP-CCC at [phone] or email [email or web address] with any questions or concerns.      Chelsey Padilla M.A., SLP-CCC  Speech-Language Pathologist

## 2018-06-20 NOTE — ADDENDUM NOTE
Encounter addended by: Chelsey Padilla, SLP on: 6/20/2018  1:25 PM<BR>     Actions taken: Pend clinical note, Sign clinical note, Episode resolved

## 2018-06-22 NOTE — TELEPHONE ENCOUNTER
SO, Meghna, returning call    Advised of messages below - Patient's SO stated an understanding and agreed with plan.    Joycelyn Jones RN  [place] [facility]

## 2018-06-22 NOTE — TELEPHONE ENCOUNTER
OV from 05/15/2018 addended  Faxed to [facility] - Joycelyn James @ [phone]    Attempt #1  Called patient's SO, Meghna, @ [phone] - Left a non-detailed message to call back and speak with any triage nurse.    Would like to inform her that this has been done    Joycelyn Jones RN  [place] [facility]

## 2018-06-22 NOTE — TELEPHONE ENCOUNTER
Patient spouse calling regarding hospital bed.  Date after 3/1 - we have been addending the 2/19/18 visit note.  Please update or advise.    Speech therapy change to [facility] from [facility] - need referral for this.  Fax number [phone] Rehab services.    Marge Heath

## 2018-06-25 NOTE — TELEPHONE ENCOUNTER
Advised of below.   The patient indicates understanding of these issues and agrees with the plan.  Ordered both.    Printed DME order. Set in [place] file for signature and fax.    Florence Castro RN  [facility]

## 2018-06-25 NOTE — TELEPHONE ENCOUNTER
Date Forms was received: June 25, 2018    Forms received by: Fax    Last office visit: 05/15/2018    Purpose of Form:  PT/ Orders /Per [facility]    When the form is due:  ASAP    How the form needs to be returned for patient:  Fax to [phone]    Form currently placed  [facility] File

## 2018-06-25 NOTE — TELEPHONE ENCOUNTER
Reason for Call:  Other prescription    Detailed comments: Meghna his wife is calling for him. They need to change a medicine or the dosage . Please call them back .    Phone Number Patient can be reached at: Other phone number:  [phone]    Best Time: any    Can we leave a detailed message on this number? YES    Call taken on 6/25/2018 at 1:56 PM by Minda Terrell

## 2018-06-25 NOTE — TELEPHONE ENCOUNTER
Disp Refills Start End CIRO   mirtazapine (REMERON) 7.5 MG TABS tablet 90 tablet 3 2/19/2018  --   Sig - Route: Take 3 tablets (22.5 mg) by mouth At Bedtime - Oral   Class: E-Prescribe   Order: [id number]   E-Prescribing Status: Receipt confirmed by pharmacy (2/19/2018 12:29 PM CST)     Medicare will only pay for 30mg at bedtime to pay for this medication.  Need changed script if appropriate.     Would like a script for a walker (wheeled walker, two wheels in front, no brakes or seat) as well. [facility] Merary [phone]. Pended order for TS approval.    Routing to PCP for further review/recommendations/orders.  Florence Castro RN  [place] [facility]

## 2018-06-26 NOTE — TELEPHONE ENCOUNTER
Completed forms faxed back to [facility] pt at [phone].   Originals sent to be scanned.     Marge Heath

## 2018-06-28 NOTE — TELEPHONE ENCOUNTER
Reason for Call:  Other call back    Detailed comments: steffanie would like to talk to someone about walker as well as some other things    Phone Number Patient can be reached at: Home number on file [phone] (home)    Best Time: any    Can we leave a detailed message on this number? YES    Call taken on 6/28/2018 at 9:20 AM by Sandy Sena

## 2018-06-28 NOTE — TELEPHONE ENCOUNTER
CTC on file for Meghna    Called patient's SO, Meghna, @ # below  States PT is looking for Order for walker - advised the order was faxed on 06/26/2018   Meghna requesting order be re-faxed to Attn: Humberto @ [phone] @ [facility]    Done.     Joycelyn Jones RN  [place] [facility]

## 2018-07-02 NOTE — MR AVS SNAPSHOT
After Visit Summary   7/2/2018    Cricket Miguel    MRN: [id number]           Patient Information     Date Of Birth          1955        Visit Information        Provider Department      7/2/2018 10:30 AM  INFUSION CHAIR 2 [facility]        Today's Diagnoses     Prophylactic antibiotic    -  1    Acute bacterial endocarditis           Follow-ups after your visit        Your next 10 appointments already scheduled     Jul 09, 2018  1:10 PM CDT   Core Return with BIBI Bettencourt CNP   [facility] ([facility]    [address]  [place] [address] [phone]              Who to contact     If you have questions or need follow up information about today's clinic visit or your schedule please contact [facility] directly at [phone].  Normal or non-critical lab and imaging results will be communicated to you by Shapewayshart, letter or phone within 4 business days after the clinic has received the results. If you do not hear from us within 7 days, please contact the clinic through Shapewayshart or phone. If you have a critical or abnormal lab result, we will notify you by phone as soon as possible.  Submit refill requests through Reflect Systems or call your pharmacy and they will forward the refill request to us. Please allow 3 business days for your refill to be completed.          Additional Information About Your Visit        MyChart Information     Reflect Systems gives you secure access to your electronic health record. If you see a primary care provider, you can also send messages to your care team and make appointments. If you have questions, please call your primary care clinic.  If you do not have a primary care provider, please call [phone] and they will assist you.        Care EveryWhere ID     This is your Care EveryWhere ID. This could be used by other  organizations to access your [place] medical records  [id number]        Your Vitals Were     Pulse Temperature Respirations             50 98  F (36.7  C) (Oral) 16          Blood Pressure from Last 3 Encounters:   07/02/18 106/71   05/25/18 128/64   05/15/18 104/66    Weight from Last 3 Encounters:   05/25/18 80.7 kg (178 lb)   05/15/18 82.6 kg (182 lb)   03/13/18 82.6 kg (182 lb)              Today, you had the following     No orders found for display       Primary Care Provider Office Phone # Fax #    Dipak Gordillo [phone]       [address] [address]        Equal Access to Services     [facility] : Hadii jenise kisero Sonico, waaxda luqadaha, qaybta kaalmada adeegyada, freddie rodríguez . So [facility] [phone].    ATENCIÓN: Si habla español, tiene a mendiola disposición servicios gratuitos de asistencia lingüística. [facility] [phone].    We comply with applicable federal civil rights laws and [place] laws. We do not discriminate on the basis of race, color, national origin, age, disability, sex, sexual orientation, or gender identity.            Thank you!     Thank you for choosing [facility]  for your care. Our goal is always to provide you with excellent care. Hearing back from our patients is one way we can continue to improve our services. Please take a few minutes to complete the written survey that you may receive in the mail after your visit with us. Thank you!             Your Updated Medication List - Protect others around you: Learn how to safely use, store and throw away your medicines at www.disposemymeds.org.          This list is accurate as of 7/2/18  1:44 PM.  Always use your most recent med list.                   Brand Name Dispense Instructions for use Diagnosis    ampicillin sodium 2 g Solr     8 mL    Inject 2 g into the vein once for 1 dose    Prophylactic antibiotic       aspirin 81 MG  chewable tablet     60 tablet    1 tablet (81 mg) by Oral or Feeding Tube route daily    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, Cerebrovascular accident (CVA) due to other mechanism (H)       atorvastatin 40 MG tablet    LIPITOR    60 tablet    Take 1 tablet (40 mg) by mouth daily    Cerebrovascular accident (CVA) due to other mechanism (H)       bumetanide 1 MG tablet    BUMEX    90 tablet    TAKE 1 TABLET BY MOUTH DAILY    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, Hypertension goal BP (blood pressure) < 140/90       Carboxymethylcellulose Sod PF 0.5 % Soln ophthalmic solution    REFRESH PLUS    1 Bottle    Place 1 drop Into the left eye 3 times daily as needed (to flush debris from eye)    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       carvedilol 12.5 MG tablet    COREG    180 tablet    Take 0.5 tablets (6.25 mg) by mouth 2 times daily    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, Hypertension goal BP (blood pressure) < 140/90       famotidine 20 MG tablet    PEPCID    90 tablet    Take 1 tablet (20 mg) by mouth daily    Medication monitoring encounter       FLUoxetine 20 MG capsule    PROzac    90 capsule    Take 1 capsule (20 mg) by mouth daily    Major depressive disorder, single episode, moderate (H), Anxiety       iron 325 (65 Fe) MG tablet      Take 1 tablet by mouth every evening        KLOR-CON 20 MEQ CR tablet   Generic drug:  potassium chloride SA     60 tablet    TAKE 1 TAB BY MOUTH TWICE DAILY    Hypokalemia       LINZESS PO      Take 145 mcg by mouth every morning (before breakfast)        losartan 50 MG tablet    COZAAR    90 tablet    TAKE 1/2 TABLET BY MOUTH EVERY 12 HOURS    Hypertension goal BP (blood pressure) < 140/90, Congestive heart failure, NYHA class 2, unspecified congestive heart failure type (H), Cardiomyopathy, unspecified type (H)       mirtazapine 30 MG tablet     REMERON    90 tablet    Take 1 tablet (30 mg) by mouth At Bedtime    Mild single current episode of major depressive disorder (H), Anxiety       multivitamin, therapeutic with minerals Tabs tablet     30 each    Take 1 tablet by mouth daily    Cerebrovascular accident (CVA) due to other mechanism (H)       OLANZapine 2.5 MG tablet    zyPREXA    90 tablet    TAKE 1 TABLET BY MOUTH EVERY DAY    Cerebrovascular accident (CVA) due to other mechanism (H), Anxiety, Delirium due to another medical condition       order for DME     1 Device    Equipment being ordered: Semi-Electric Hospital Bed.  Length of Need: Lifetime    Cerebrovascular accident (CVA), unspecified mechanism (H), Chronic systolic heart failure (H), Congestive heart failure, NYHA class 2, unspecified congestive heart failure type (H), COPD, mild (H), Acute bacterial endocarditis, S/P AVR (aortic valve replacement), H/O aortic root repair, Encephalopathy, Hemiparesis of right dominant side (H)       order for DME     1 Device    Equipment being ordered: walker with two front wheels. No seat or brakes. Mechelle Reagan at [facility]. fax- [phone]    CVA (cerebral vascular accident) (H)       Potassium Chloride ER 20 MEQ Tbcr     60 tablet    Take 1 tablet (20 mEq) by mouth 2 times daily    Hypokalemia       vancomycin 2,000 mg     1 each    Inject 2,000 mg into the vein once    Prophylactic antibiotic

## 2018-07-02 NOTE — PROGRESS NOTES
Infusion Nursing Note:  Cricket Miguel presents today for Antibiotics per oral surgery appointment.    Patient seen by provider today: No   present during visit today: Not Applicable.    Note: pt will receive Vancomycin and Ampicillin today.     Intravenous Access:  Peripheral IV placed.    Treatment Conditions:  Not Applicable.      Post Infusion Assessment:  Patient tolerated infusion without incident.  Site patent and intact, free from redness, edema or discomfort.  No evidence of extravasations.  Access discontinued per protocol.    Discharge Plan:   Discharge instructions reviewed with: Patient and Family.  Patient and/or family verbalized understanding of discharge instructions and all questions answered.  AVS to patient via ZykisT.  Patient will return -therapy complete- for next appointment.   Patient discharged in stable condition accompanied by: sAmmonoAmmon.  Departure Mode: Wheelchair.    Anastasiia Kruse RN

## 2018-07-03 NOTE — TELEPHONE ENCOUNTER
"Requested Prescriptions   Pending Prescriptions Disp Refills     mirtazapine (REMERON) 7.5 MG TABS tablet [Pharmacy Med Name: MIRTAZAPINE 7.5 MG TABLET] 90 tablet 0     Sig: TAKE THREE TABLETS BY MOUTH AT BEDTIME.    Atypical Antidepressants Protocol Passed    7/3/2018  1:42 AM       Passed - Recent (12 mo) or future (30 days) visit within the authorizing provider's specialty    Patient had office visit in the last 12 months or has a visit in the next 30 days with authorizing provider or within the authorizing provider's specialty.  See \"Patient Info\" tab in inbasket, or \"Choose Columns\" in Meds & Orders section of the refill encounter.           Passed - Patient is age 18 or older        5/15/18 LOV.    PHQ-9 SCORE 5/21/2014 6/6/2016 5/15/2018   Total Score 2 - -   Total Score - 0 8     This was just put through on 6/25/18 to [facility] [place]. Declined.     Florence Castro RN  [place] [facility]    "

## 2018-07-05 NOTE — TELEPHONE ENCOUNTER
Vaishnavi message received:     Giovanni needs to have implants for his lower dentures, due to the stroke he doesn't have enough control of his tongue to keep the lower dentures in place. When I set the appointment for these two implants I will contact you so that he can get a IV infusion of antibiotics prior to the surgery. Trust me if there was a different way to get him teeth I would of gone with that because drilling into the bone and placing these implants scares the hell out of me. This is a correct way of doing this,yes? We are just starting soon with the CT radiological index and I believe this is non invasive procedure but then right after that comes the implants. Please let me know if your ok with all this!        Will route to Dr. Garzon to ensure he is ok with this.

## 2018-07-09 NOTE — MR AVS SNAPSHOT
After Visit Summary   7/9/2018    Cricket Miguel    MRN: [id number]           Patient Information     Date Of Birth          1955        Visit Information        Provider Department      7/9/2018 1:10 PM Candice Olivera APRN [facility]        Today's Diagnoses     Ischemic cardiomyopathy          Care Instructions    Call the C.O.R.E. nurse for any questions or concerns:  [phone]    1.  Medication changes from today: no changes     2. Follow up plans: see Dr. Garzon in 6 mo. With echo. , Keep taking deep breaths     3.  Weigh yourself daily and write it down.    4.  Call CORE nurse if your weight is up more than 2 pounds in one day or 5 pounds in one week.    5.  Call CORE nurse if you feel more short of breath, have more abdominal bloating, or leg swelling.    6.  Continue low sodium diet (less than 2000 mg daily). If you eat less salt, you will retain less fluid.    7.  Alcohol can weaken your heart further. You should avoid alcohol or limit its use to special times, such as a holiday or birthday.     8.  Do NOT take Aleve (naproxen) or Advil (ibuprofen) without talking to your doctor first.     CORE Clinic: Cardiomyopathy, Optimization, Rehabilitation, Education  The CORE Clinic is a heart failure specialty clinic within the [facility] where you will work with your cardiologist, nurse practitioners, physician assistants and registered nurses who specialize in heart failure care. They are dedicated to helping patients with heart failure to carefully adjust medications, receive education, and learn who and when to call if symptoms develop. They specialize in helping you better understand your condition, slow the progression of your disease, improve the length and quality of your life, help you detect future heart problems before they become life threatening, and avoid hospitalizations.                 "Follow-ups after your visit        Additional Services     Follow-Up with Cardiologist                 Future tests that were ordered for you today     Open Future Orders        Priority Expected Expires Ordered    Echocardiogram Routine 1/5/2019 7/9/2019 7/9/2018    Follow-Up with Cardiologist Routine 1/5/2019 7/9/2019 7/9/2018            Who to contact     If you have questions or need follow up information about today's clinic visit or your schedule please contact [facility] directly at [phone].  Normal or non-critical lab and imaging results will be communicated to you by Get Inhart, letter or phone within 4 business days after the clinic has received the results. If you do not hear from us within 7 days, please contact the clinic through PetroDE or phone. If you have a critical or abnormal lab result, we will notify you by phone as soon as possible.  Submit refill requests through PetroDE or call your pharmacy and they will forward the refill request to us. Please allow 3 business days for your refill to be completed.          Additional Information About Your Visit        Get Inhart Information     PetroDE gives you secure access to your electronic health record. If you see a primary care provider, you can also send messages to your care team and make appointments. If you have questions, please call your primary care clinic.  If you do not have a primary care provider, please call [phone] and they will assist you.        Care EveryWhere ID     This is your Care EveryWhere ID. This could be used by other organizations to access your [place] medical records  [id number]        Your Vitals Were     Pulse Height Pulse Oximetry             61 1.702 m (5' 7\") 96%          Blood Pressure from Last 3 Encounters:   07/09/18 111/79   07/02/18 106/71   05/25/18 128/64    Weight from Last 3 Encounters:   05/25/18 80.7 kg (178 lb)   05/15/18 82.6 kg (182 lb)   03/13/18 82.6 " kg (182 lb)              We Performed the Following     Follow-Up with Cardiac Advanced Practice Provider          Today's Medication Changes          These changes are accurate as of 7/9/18  2:04 PM.  If you have any questions, ask your nurse or doctor.               Stop taking these medicines if you haven't already. Please contact your care team if you have questions.     Potassium Chloride ER 20 MEQ Tbcr   Stopped by:  Candice Olivera APRN CNP                    Primary Care Provider Office Phone # Fax #    Dipak Gordillo [phone]       [address] [facility] [address]        Equal Access to Services     [facility]: Hadii aad ku hadasho Soomaali, waaxda luqadaha, qaybta kaalmada adeegyada, freddie rodríguez . So [facility] [phone].    ATENCIÓN: Si habla español, tiene a mendiola disposición servicios gratuitos de asistencia lingüística. [facility] [phone].    We comply with applicable federal civil rights laws and [place] laws. We do not discriminate on the basis of race, color, national origin, age, disability, sex, sexual orientation, or gender identity.            Thank you!     Thank you for choosing [facility]  for your care. Our goal is always to provide you with excellent care. Hearing back from our patients is one way we can continue to improve our services. Please take a few minutes to complete the written survey that you may receive in the mail after your visit with us. Thank you!             Your Updated Medication List - Protect others around you: Learn how to safely use, store and throw away your medicines at www.disposemymeds.org.          This list is accurate as of 7/9/18  2:04 PM.  Always use your most recent med list.                   Brand Name Dispense Instructions for use Diagnosis    aspirin 81 MG chewable tablet     60 tablet    1 tablet (81 mg) by Oral or Feeding Tube route daily     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, Cerebrovascular accident (CVA) due to other mechanism (H)       atorvastatin 40 MG tablet    LIPITOR    60 tablet    Take 1 tablet (40 mg) by mouth daily    Cerebrovascular accident (CVA) due to other mechanism (H)       bumetanide 1 MG tablet    BUMEX    90 tablet    TAKE 1 TABLET BY MOUTH DAILY    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, Hypertension goal BP (blood pressure) < 140/90       Carboxymethylcellulose Sod PF 0.5 % Soln ophthalmic solution    REFRESH PLUS    1 Bottle    Place 1 drop Into the left eye 3 times daily as needed (to flush debris from eye)    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       carvedilol 12.5 MG tablet    COREG    180 tablet    Take 0.5 tablets (6.25 mg) by mouth 2 times daily    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, Hypertension goal BP (blood pressure) < 140/90       famotidine 20 MG tablet    PEPCID    90 tablet    Take 1 tablet (20 mg) by mouth daily    Medication monitoring encounter       FLUoxetine 20 MG capsule    PROzac    90 capsule    Take 1 capsule (20 mg) by mouth daily    Major depressive disorder, single episode, moderate (H), Anxiety       iron 325 (65 Fe) MG tablet      Take 1 tablet by mouth every evening        KLOR-CON 20 MEQ CR tablet   Generic drug:  potassium chloride SA     60 tablet    TAKE 1 TAB BY MOUTH TWICE DAILY    Hypokalemia       LINZESS PO      Take 145 mcg by mouth every morning (before breakfast)        losartan 50 MG tablet    COZAAR    90 tablet    TAKE 1/2 TABLET BY MOUTH EVERY 12 HOURS    Hypertension goal BP (blood pressure) < 140/90, Congestive heart failure, NYHA class 2, unspecified congestive heart failure type (H), Cardiomyopathy, unspecified type (H)       mirtazapine 30 MG tablet    REMERON    90 tablet    Take 1 tablet (30 mg) by mouth At Bedtime    Mild single  current episode of major depressive disorder (H), Anxiety       multivitamin, therapeutic with minerals Tabs tablet     30 each    Take 1 tablet by mouth daily    Cerebrovascular accident (CVA) due to other mechanism (H)       OLANZapine 2.5 MG tablet    zyPREXA    90 tablet    TAKE 1 TABLET BY MOUTH EVERY DAY    Cerebrovascular accident (CVA) due to other mechanism (H), Anxiety, Delirium due to another medical condition       order for DME     1 Device    Equipment being ordered: Semi-Electric Hospital Bed.  Length of Need: Lifetime    Cerebrovascular accident (CVA), unspecified mechanism (H), Chronic systolic heart failure (H), Congestive heart failure, NYHA class 2, unspecified congestive heart failure type (H), COPD, mild (H), Acute bacterial endocarditis, S/P AVR (aortic valve replacement), H/O aortic root repair, Encephalopathy, Hemiparesis of right dominant side (H)       order for DME     1 Device    Equipment being ordered: walker with two front wheels. No seat or brakes. Mechelle Reagan at [facility]. fax- [phone]    CVA (cerebral vascular accident) (H)

## 2018-07-09 NOTE — PATIENT INSTRUCTIONS
Call the C.O.R.E. nurse for any questions or concerns:  [phone]    1.  Medication changes from today: no changes     2. Follow up plans: see Dr. Garzon in 6 mo. With echo. , Keep taking deep breaths     3.  Weigh yourself daily and write it down.    4.  Call CORE nurse if your weight is up more than 2 pounds in one day or 5 pounds in one week.    5.  Call CORE nurse if you feel more short of breath, have more abdominal bloating, or leg swelling.    6.  Continue low sodium diet (less than 2000 mg daily). If you eat less salt, you will retain less fluid.    7.  Alcohol can weaken your heart further. You should avoid alcohol or limit its use to special times, such as a holiday or birthday.     8.  Do NOT take Aleve (naproxen) or Advil (ibuprofen) without talking to your doctor first.     [facility]: Cardiomyopathy, Optimization, Rehabilitation, Education  The [facility] is a heart failure specialty clinic within the [facility] where you will work with your cardiologist, nurse practitioners, physician assistants and registered nurses who specialize in heart failure care. They are dedicated to helping patients with heart failure to carefully adjust medications, receive education, and learn who and when to call if symptoms develop. They specialize in helping you better understand your condition, slow the progression of your disease, improve the length and quality of your life, help you detect future heart problems before they become life threatening, and avoid hospitalizations.

## 2018-07-09 NOTE — LETTER
7/9/2018    Dipak Gordillo MD  [address] [address]    RE: Cricket Miguel       Dear Colleague,    I had the pleasure of seeing Cricket Miguel in the [facility].    HPI and Plan:   See [id number]    Orders Placed This Encounter   Procedures     Follow-Up with Cardiologist     Echocardiogram       No orders of the defined types were placed in this encounter.      Medications Discontinued During This Encounter   Medication Reason     ampicillin sodium 2 g SOLR Therapy completed     Potassium Chloride ER 20 MEQ TBCR Dose adjustment     vancomycin 2,000 mg Therapy completed         Encounter Diagnosis   Name Primary?     Ischemic cardiomyopathy        CURRENT MEDICATIONS:  Current Outpatient Prescriptions   Medication Sig Dispense Refill     aspirin 81 MG chewable tablet 1 tablet (81 mg) by Oral or Feeding Tube route daily 60 tablet 1     atorvastatin (LIPITOR) 40 MG tablet Take 1 tablet (40 mg) by mouth daily 60 tablet 1     bumetanide (BUMEX) 1 MG tablet TAKE 1 TABLET BY MOUTH DAILY 90 tablet 1     Carboxymethylcellulose Sod PF (REFRESH PLUS) 0.5 % SOLN ophthalmic solution Place 1 drop Into the left eye 3 times daily as needed (to flush debris from eye) 1 Bottle 1     carvedilol (COREG) 12.5 MG tablet Take 0.5 tablets (6.25 mg) by mouth 2 times daily 180 tablet 1     famotidine (PEPCID) 20 MG tablet Take 1 tablet (20 mg) by mouth daily 90 tablet 1     Ferrous Sulfate (IRON) 325 (65 FE) MG tablet Take 1 tablet by mouth every evening       FLUoxetine (PROZAC) 20 MG capsule Take 1 capsule (20 mg) by mouth daily 90 capsule 3     KLOR-CON 20 MEQ CR tablet TAKE 1 TAB BY MOUTH TWICE DAILY 60 tablet 3     Linaclotide (LINZESS PO) Take 145 mcg by mouth every morning (before breakfast)       losartan (COZAAR) 50 MG tablet TAKE 1/2 TABLET BY MOUTH EVERY 12 HOURS 90 tablet 3     mirtazapine (REMERON) 30 MG tablet Take 1 tablet (30 mg) by mouth At Bedtime 90 tablet 3      multivitamin, therapeutic with minerals (THERA-VIT-M) TABS tablet Take 1 tablet by mouth daily 30 each 0     OLANZapine (ZYPREXA) 2.5 MG tablet TAKE 1 TABLET BY MOUTH EVERY DAY 90 tablet 1     order for DME Equipment being ordered: walker with two front wheels. No seat or brakes. Attn Merary at [facility]. fax- [phone] 1 Device 0     order for DME Equipment being ordered: Semi-Electric Hospital Bed.   Length of Need: Lifetime 1 Device 0       ALLERGIES     Allergies   Allergen Reactions     Lisinopril Swelling     One-sided facial swelling after being on lisinopril/HCTZ for one week.        PAST MEDICAL HISTORY:  Past Medical History:   Diagnosis Date     Abscess of aortic root 10/06/2017     AI (aortic insufficiency)     severe     Anxiety      Aortic root dilatation (H)      AR (aortic regurgitation)     severe     Cardiomyopathy (H)      CHF (congestive heart failure), NYHA class II (H)      COPD, mild (H)     spirometry 12/16     CVA (cerebral vascular accident) (H) 10/06/2017    septic emboli - MSSA - cerebellum, Left MCA, Rt Occipital, Aphasia, Left facial paralysis, Right>Left UE/LE weakness, Left visual field cut, moderate to severe encephalopathy, cognitive dysfunction, word finding     Depression 10/06/2017     Elevated PSA 05/2018    PSA = 8, Dr Garcia     Heart murmur      Hyperlipidemia LDL goal <70 10/31/2010     Hypertension goal BP (blood pressure) < 140/90      Inguinal hernia      left lung nodule  1/29/2008     Major depressive disorder, single episode, moderate (H)      Psoriasis childhood     SNHL (sensorineural hearing loss)     Left, secondary to CVA     Tobacco use disorder        PAST SURGICAL HISTORY:  Past Surgical History:   Procedure Laterality Date     ARTHROSCOPY KNEE INCISION AND DRAINAGE Left 10/8/2017    Arthroscopic Incision and Drainage of Left Knee - Dr Munoz     HC SHOULDER ARTHROSCOPY, DX  2001    Arthroscopy, Shoulder RT Dr OSIRIS Andersen     HC SHOULDER  ARTHROSCOPY, DX  1/04    LT arthroscopy Dr OSIRIS Andersen     HERNIA REPAIR, UMBILICAL  1/08    incarcerated - dr rockwell     HERNIORRHAPHY INGUINAL  12/21/2012    HERNIORRHAPHY INGUINAL;  Right Inguinal Hernia Repair with mesh ;  Surgeon: Mello Rockwell MD;  Location: [facility] OR     REPAIR ANEURYSM ASCENDING AORTA N/A 12/19/2017    REPAIR ANEURYSM ASCENDING AORTA;  REDO Median STERNOTOMY, FEMORAL CANNULATION, Lysis of adhesions, AORTIC ROOT VALVE HOMOGRAFT with 26mm x 7.0cm Cryolife Aortic valve and conduit - Dr Bowers     REPLACE VALVE AORTIC N/A 5/17/2016    REPLACE VALVE AORTIC - Dr Bowers     ROTATOR CUFF REPAIR RT/LT  11/10    Rt arthroscopy - Dr OSIRIS Andersen     SURGICAL HISTORY OF -   5/16    AVR, severe AR, aortic root repair     TRACHEOSTOMY PERCUTANEOUS  10/27/2017            FAMILY HISTORY:  Family History   Problem Relation Age of Onset     Genetic Disorder Mother      Bone plateover growth Agustin. shoulder surgeries     Cancer Mother      brain cancer     Alzheimer Disease Father        SOCIAL HISTORY:  Social History     Social History     Marital status:      Spouse name: N/A     Number of children: 3     Years of education: 12     Social History Main Topics     Smoking status: Former Smoker     Packs/day: 1.00     Years: 35.00     Quit date: 4/1/2016     Smokeless tobacco: Never Used      Comment: 4/2016     Alcohol use No     Drug use: Yes      Comment: marijuana- daily previously     Sexual activity: Yes     Partners: Female     Other Topics Concern     Caffeine Concern Yes     3 cups     Sleep Concern No     Special Diet No     trying to watch salt     Exercise Yes     walking     Seat Belt No     Parent/Sibling W/ Cabg, Mi Or Angioplasty Before 65f 55m? No     Social History Narrative       Review of Systems:  Skin:  Positive for hair changes     Eyes:  Positive for glasses    ENT:  Positive for hearing loss    Respiratory:  Negative       Cardiovascular:  Negative   Rehab for  "stroke  Gastroenterology: Negative      Genitourinary:  Negative      Musculoskeletal:  Negative      Neurologic:  Positive for paralysis;stroke right sided weakness, left side facial droop  Psychiatric:  Positive for excessive stress;anxiety;depression    Heme/Lymph/Imm:  Negative      Endocrine:  Negative        Physical Exam:  Vitals: /79 (BP Location: Left arm, Patient Position: Sitting, Cuff Size: Adult Regular)  Pulse 61  Ht 1.702 m (5' 7\")  SpO2 96%    Constitutional:  cooperative        Skin:  warm and dry to the touch surgical scars well-healed        Head:  normocephalic        Eyes:  sclera white   left lid lag and facial droop    Lymph:      ENT:  no pallor or cyanosis        Neck:  JVP normal        Respiratory:  normal breath sounds, clear to auscultation, normal A-P diameter, normal symmetry, normal respiratory excursion, no use of accessory muscles         Cardiac: regular rhythm       grade 1;RUSB        pulses full and equal                                        GI:  abdomen soft        Extremities and Muscular Skeletal:  no edema              Neurological:      left facial droop, right hemiparesis.    Psych:  Alert and Oriented x 3        CC  Alfredito Garzon MD  [address]  [place] [address]                Thank you for allowing me to participate in the care of your patient.      Sincerely,     BIBI Faust [facility] [place]    cc:   Alfredito Garzon MD  [address]  [place] [address]        "

## 2018-07-09 NOTE — PROGRESS NOTES
HPI and Plan:   See [id number]    Orders Placed This Encounter   Procedures     Follow-Up with Cardiologist     Echocardiogram       No orders of the defined types were placed in this encounter.      Medications Discontinued During This Encounter   Medication Reason     ampicillin sodium 2 g SOLR Therapy completed     Potassium Chloride ER 20 MEQ TBCR Dose adjustment     vancomycin 2,000 mg Therapy completed         Encounter Diagnosis   Name Primary?     Ischemic cardiomyopathy        CURRENT MEDICATIONS:  Current Outpatient Prescriptions   Medication Sig Dispense Refill     aspirin 81 MG chewable tablet 1 tablet (81 mg) by Oral or Feeding Tube route daily 60 tablet 1     atorvastatin (LIPITOR) 40 MG tablet Take 1 tablet (40 mg) by mouth daily 60 tablet 1     bumetanide (BUMEX) 1 MG tablet TAKE 1 TABLET BY MOUTH DAILY 90 tablet 1     Carboxymethylcellulose Sod PF (REFRESH PLUS) 0.5 % SOLN ophthalmic solution Place 1 drop Into the left eye 3 times daily as needed (to flush debris from eye) 1 Bottle 1     carvedilol (COREG) 12.5 MG tablet Take 0.5 tablets (6.25 mg) by mouth 2 times daily 180 tablet 1     famotidine (PEPCID) 20 MG tablet Take 1 tablet (20 mg) by mouth daily 90 tablet 1     Ferrous Sulfate (IRON) 325 (65 FE) MG tablet Take 1 tablet by mouth every evening       FLUoxetine (PROZAC) 20 MG capsule Take 1 capsule (20 mg) by mouth daily 90 capsule 3     KLOR-CON 20 MEQ CR tablet TAKE 1 TAB BY MOUTH TWICE DAILY 60 tablet 3     Linaclotide (LINZESS PO) Take 145 mcg by mouth every morning (before breakfast)       losartan (COZAAR) 50 MG tablet TAKE 1/2 TABLET BY MOUTH EVERY 12 HOURS 90 tablet 3     mirtazapine (REMERON) 30 MG tablet Take 1 tablet (30 mg) by mouth At Bedtime 90 tablet 3     multivitamin, therapeutic with minerals (THERA-VIT-M) TABS tablet Take 1 tablet by mouth daily 30 each 0     OLANZapine (ZYPREXA) 2.5 MG tablet TAKE 1 TABLET BY MOUTH EVERY DAY 90 tablet 1     order for DME Equipment being  ordered: walker with two front wheels. No seat or brakes. Attn Merary at [facility]. fax- [phone] 1 Device 0     order for DME Equipment being ordered: Semi-Electric Hospital Bed.   Length of Need: Lifetime 1 Device 0       ALLERGIES     Allergies   Allergen Reactions     Lisinopril Swelling     One-sided facial swelling after being on lisinopril/HCTZ for one week.        PAST MEDICAL HISTORY:  Past Medical History:   Diagnosis Date     Abscess of aortic root 10/06/2017     AI (aortic insufficiency)     severe     Anxiety      Aortic root dilatation (H)      AR (aortic regurgitation)     severe     Cardiomyopathy (H)      CHF (congestive heart failure), NYHA class II (H)      COPD, mild (H)     spirometry 12/16     CVA (cerebral vascular accident) (H) 10/06/2017    septic emboli - MSSA - cerebellum, Left MCA, Rt Occipital, Aphasia, Left facial paralysis, Right>Left UE/LE weakness, Left visual field cut, moderate to severe encephalopathy, cognitive dysfunction, word finding     Depression 10/06/2017     Elevated PSA 05/2018    PSA = 8, Dr Garcia     Heart murmur      Hyperlipidemia LDL goal <70 10/31/2010     Hypertension goal BP (blood pressure) < 140/90      Inguinal hernia      left lung nodule  1/29/2008     Major depressive disorder, single episode, moderate (H)      Psoriasis childhood     SNHL (sensorineural hearing loss)     Left, secondary to CVA     Tobacco use disorder        PAST SURGICAL HISTORY:  Past Surgical History:   Procedure Laterality Date     ARTHROSCOPY KNEE INCISION AND DRAINAGE Left 10/8/2017    Arthroscopic Incision and Drainage of Left Knee - Dr Munoz     HC SHOULDER ARTHROSCOPY, DX  2001    Arthroscopy, Shoulder RT Dr OSIRIS Andersen     HC SHOULDER ARTHROSCOPY, DX  1/04    LT arthroscopy Dr OSIRIS Andersen     HERNIA REPAIR, UMBILICAL  1/08    incarcerated - dr puente     HERNIORRHAPHY INGUINAL  12/21/2012    HERNIORRHAPHY INGUINAL;  Right Inguinal Hernia Repair with mesh ;   Surgeon: Mello Rockwell MD;  Location: [facility] OR     REPAIR ANEURYSM ASCENDING AORTA N/A 12/19/2017    REPAIR ANEURYSM ASCENDING AORTA;  REDO Median STERNOTOMY, FEMORAL CANNULATION, Lysis of adhesions, AORTIC ROOT VALVE HOMOGRAFT with 26mm x 7.0cm Cryolife Aortic valve and conduit - Dr Bowers     REPLACE VALVE AORTIC N/A 5/17/2016    REPLACE VALVE AORTIC - Dr Bowers     ROTATOR CUFF REPAIR RT/LT  11/10    Rt arthroscopy - Dr OSIRIS Andersen     SURGICAL HISTORY OF -   5/16    AVR, severe AR, aortic root repair     TRACHEOSTOMY PERCUTANEOUS  10/27/2017            FAMILY HISTORY:  Family History   Problem Relation Age of Onset     Genetic Disorder Mother      Bone plateover growth Agustin. shoulder surgeries     Cancer Mother      brain cancer     Alzheimer Disease Father        SOCIAL HISTORY:  Social History     Social History     Marital status:      Spouse name: N/A     Number of children: 3     Years of education: 12     Social History Main Topics     Smoking status: Former Smoker     Packs/day: 1.00     Years: 35.00     Quit date: 4/1/2016     Smokeless tobacco: Never Used      Comment: 4/2016     Alcohol use No     Drug use: Yes      Comment: marijuana- daily previously     Sexual activity: Yes     Partners: Female     Other Topics Concern     Caffeine Concern Yes     3 cups     Sleep Concern No     Special Diet No     trying to watch salt     Exercise Yes     walking     Seat Belt No     Parent/Sibling W/ Cabg, Mi Or Angioplasty Before 65f 55m? No     Social History Narrative       Review of Systems:  Skin:  Positive for hair changes     Eyes:  Positive for glasses    ENT:  Positive for hearing loss    Respiratory:  Negative       Cardiovascular:  Negative   Rehab for stroke  Gastroenterology: Negative      Genitourinary:  Negative      Musculoskeletal:  Negative      Neurologic:  Positive for paralysis;stroke right sided weakness, left side facial droop  Psychiatric:  Positive for excessive  "stress;anxiety;depression    Heme/Lymph/Imm:  Negative      Endocrine:  Negative        Physical Exam:  Vitals: /79 (BP Location: Left arm, Patient Position: Sitting, Cuff Size: Adult Regular)  Pulse 61  Ht 1.702 m (5' 7\")  SpO2 96%    Constitutional:  cooperative        Skin:  warm and dry to the touch surgical scars well-healed        Head:  normocephalic        Eyes:  sclera white   left lid lag and facial droop    Lymph:      ENT:  no pallor or cyanosis        Neck:  JVP normal        Respiratory:  normal breath sounds, clear to auscultation, normal A-P diameter, normal symmetry, normal respiratory excursion, no use of accessory muscles         Cardiac: regular rhythm       grade 1;RUSB        pulses full and equal                                        GI:  abdomen soft        Extremities and Muscular Skeletal:  no edema              Neurological:      left facial droop, right hemiparesis.    Psych:  Alert and Oriented x 3        CC  Alfredito Garzon MD  [address]  [place] [address]              "

## 2018-07-09 NOTE — LETTER
7/9/2018      Dipak Gordillo MD  [address] [facility] [address]      RE: Cricket Miguel       Dear Colleague,    I had the pleasure of seeing Cricket Miguel in the [facility].    Service Date: 07/09/2018      HISTORY OF PRESENT ILLNESS:  I had the pleasure of seeing Mr. Miguel, a pleasant 63-year-old patient who follows with Dr. 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  He had dental work done in the meantime, which may have been the source of his infection.  Recent blood cultures from March were negative.  His cardiomyopathy with decreased LV function has been improving.  Dr. Garzon reviewed the echocardiogram when he was seen 04/05/2018 showing an improving LVEF of closer to 50%.      In May, he had an emergency room visit.  He was diagnosed with C. difficile and discharged on antibiotics, which he has completed.  He is now going through more dental implants.      Today he feels great.  He states he has no complaints.  He has no complaints of chest pain, chest pressure, neck or arm pain.  No complaints of shortness of breath.      IMPRESSION AND PLAN:   1.  History of infective endocarditis, status post aortic valve and ascending aortic replacement, subsequent stroke and dehiscence requiring replacement.      From a cardiac standpoint, patient is doing well.  His cardiovascular medications are appropriate.  Dr. Gordillo decreased his carvedilol back in April due to lethargy.  I have left the carvedilol at 6.25 mg twice a day.      2.  Coronary artery disease, status post CABG at the time of his initial valve replacement.  Continue medical management.      3.  Stroke with left-sided facial droop and right-sided hemiparesis.      Continue rehabilitation.      4.  Cardiomyopathy, improving.  Follow up with Dr. Garzon in 6 months with an echocardiogram done prior to the  visit.         BIBI MARCIAL, CNP             D: 2018   T: 2018   MT: MERCEDEZ      Name:     ASHTYN STROUD   MRN:      [id number]        Account:      [id number]   :      1955           Service Date: 2018      Document: [id number]           Outpatient Encounter Prescriptions as of 2018   Medication Sig Dispense Refill     aspirin 81 MG chewable tablet 1 tablet (81 mg) by Oral or Feeding Tube route daily 60 tablet 1     atorvastatin (LIPITOR) 40 MG tablet Take 1 tablet (40 mg) by mouth daily 60 tablet 1     bumetanide (BUMEX) 1 MG tablet TAKE 1 TABLET BY MOUTH DAILY 90 tablet 1     Carboxymethylcellulose Sod PF (REFRESH PLUS) 0.5 % SOLN ophthalmic solution Place 1 drop Into the left eye 3 times daily as needed (to flush debris from eye) 1 Bottle 1     carvedilol (COREG) 12.5 MG tablet Take 0.5 tablets (6.25 mg) by mouth 2 times daily 180 tablet 1     famotidine (PEPCID) 20 MG tablet Take 1 tablet (20 mg) by mouth daily 90 tablet 1     Ferrous Sulfate (IRON) 325 (65 FE) MG tablet Take 1 tablet by mouth every evening       FLUoxetine (PROZAC) 20 MG capsule Take 1 capsule (20 mg) by mouth daily 90 capsule 3     KLOR-CON 20 MEQ CR tablet TAKE 1 TAB BY MOUTH TWICE DAILY 60 tablet 3     Linaclotide (LINZESS PO) Take 145 mcg by mouth every morning (before breakfast)       losartan (COZAAR) 50 MG tablet TAKE 1/2 TABLET BY MOUTH EVERY 12 HOURS 90 tablet 3     mirtazapine (REMERON) 30 MG tablet Take 1 tablet (30 mg) by mouth At Bedtime 90 tablet 3     multivitamin, therapeutic with minerals (THERA-VIT-M) TABS tablet Take 1 tablet by mouth daily 30 each 0     OLANZapine (ZYPREXA) 2.5 MG tablet TAKE 1 TABLET BY MOUTH EVERY DAY 90 tablet 1     order for DME Equipment being ordered: walker with two front wheels. No seat or brakes. Mechelle Reagan at [facility]. fax- [phone] 1 Device 0     order for DME Equipment being ordered: Semi-Electric Hospital Bed.   Length of Need:  Lifetime 1 Device 0     [DISCONTINUED] ampicillin sodium 2 g SOLR Inject 2 g into the vein once for 1 dose 8 mL 0     [DISCONTINUED] Potassium Chloride ER 20 MEQ TBCR Take 1 tablet (20 mEq) by mouth 2 times daily 60 tablet 3     [DISCONTINUED] vancomycin 2,000 mg Inject 2,000 mg into the vein once 1 each 0     No facility-administered encounter medications on file as of 7/9/2018.        Again, thank you for allowing me to participate in the care of your patient.      Sincerely,    BIBI Faust [facility]

## 2018-07-10 NOTE — TELEPHONE ENCOUNTER
Date Forms was received: July 10, 2018    Forms received by: Fax    Purpose of Form:  Speech therapy orders    How the form needs to be returned for patient:  Fax    Form currently placed  [employer]

## 2018-07-11 NOTE — TELEPHONE ENCOUNTER
Completed forms faxed back to [facility] at [phone].   Originals sent to be scanned.     Marge Heath

## 2018-08-02 NOTE — TELEPHONE ENCOUNTER
Reason for Call:  call back    Detailed comments: The Spouse Meghna called and ask if Dr. Ash can see Cricket someday on August since he is not feeling very well, All the schedule is fully booked the whole month.     Phone Number Patient can be reached at: Cell number on file:    Telephone Information:   Mobile [phone]       Best Time: anytime     Can we leave a detailed message on this number? YES    Call taken on 8/2/2018 at 2:58 PM by ROSENDO RENTERIA

## 2018-08-03 NOTE — TELEPHONE ENCOUNTER
I spoke with Meghna. Consent on file.    RESPIRATORY SYMPTOMS      Duration: 2 weeks    Description  Productive cough with clear phlegm, congestion    Severity: mild    Accompanying signs and symptoms: None    History (predisposing factors):  History of COPD but is not on any medication, history of stroke    Precipitating or alleviating factors: No exposure to anyone who has been sick, girlfriend does smoke but not around him    Therapies tried and outcome:  none         She denies fever and SOB.      They want to wait for Dr. Gordillo to come back to be seen.  I did offer appointment next week with another provider.      Routing to  to review and advise on appointment date and time.    Merary Kiran, RANJEET, RN, N  [facility]) [phone]

## 2018-08-03 NOTE — TELEPHONE ENCOUNTER
As long as no f/c/s, respiratory compromise, will follow, advise appt ASAP, DO NOT wait to long as we want no infection set in, any allergy sx?    Clinic open in AM, probably best to be seen

## 2018-08-03 NOTE — TELEPHONE ENCOUNTER
Meghna , spouse is returning our call . Please try calling her back at [phone]  OK to leave a detailed message.  Marcelina Terrell, Clinic Receptionist

## 2018-08-03 NOTE — TELEPHONE ENCOUNTER
Attempt # 1    Left non-detailed VM for Meghna to call back and speak with any triage nurse.  Consent on file to speak with Meghna.    RANJEET Crow, RN, PHN  [facility]

## 2018-08-03 NOTE — TELEPHONE ENCOUNTER
Advised with detailed vm not to wait for TS due to sx and history.    Advised to call the clinic back and schedule for tomorrow or Monday.    Pt advised to call the clinic with any further symptoms or changes. Also advised to go to UC or ER if symptoms increase.     Florence Castro RN  [facility]

## 2018-08-04 NOTE — PROGRESS NOTES
SUBJECTIVE:   Cricket Mgiuel is a 63 year old male who presents to clinic today for the following health issues:      RESPIRATORY SYMPTOMS       Duration: 2 weeks    Description  Productive cough but not sure if its this color, congestion, wants his nose looked at. No hemoptysis.    Severity: mild    Accompanying signs and symptoms: None    History (predisposing factors):  History of COPD but is not on any medication, history of stroke    Precipitating or alleviating factors: No exposure to anyone who has been sick, girlfriend does smoke but not around him    Therapies tried and outcome:  none      For the past 2 weeks, Cricket endorses wheezing and cough without improvement. Girlfriend noted some thick nasal discharge. No fevers or chills. Denies any ear pain/pressure. No itchy eyes, sneezing. No sinus pressure or pain. No chest pain or shortness of breath.      Problem list and histories reviewed & adjusted, as indicated.  Additional history: as documented    Patient Active Problem List   Diagnosis     Tobacco use disorder     Hypertension goal BP (blood pressure) < 140/90     Disease of lung     Major depressive disorder, single episode, moderate (HCC)     Advance Care Planning     Psoriasis     COPD, mild (H)     CHF (congestive heart failure), NYHA class II (H)     AR (aortic regurgitation)     AI (aortic insufficiency)     Aortic root dilatation (H)     Hyperlipidemia LDL goal <70     Health Care Home     Status post coronary angiogram     Cardiomyopathy (H)     S/P AVR (aortic valve replacement)     H/O aortic root repair     Anemia     Endocarditis     Encephalopathy     Abscess of aortic root     CVA (cerebral vascular accident) (H)     Anxiety     Chronic systolic heart failure (H)     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     SNHL (sensorineural hearing loss)     Hx of aortic root repair     Prophylactic antibiotic     Elevated PSA     Constipation     Past Surgical  "History:   Procedure Laterality Date     ARTHROSCOPY KNEE INCISION AND DRAINAGE Left 10/8/2017    Arthroscopic Incision and Drainage of Left Knee - Dr Munoz     HC SHOULDER ARTHROSCOPY, DX  2001    Arthroscopy, Shoulder RT Dr OSIRIS Andersen     HC SHOULDER ARTHROSCOPY, DX  1/04    LT arthroscopy Dr OSIRIS Andersen     HERNIA REPAIR, UMBILICAL  1/08    incarcerated - dr rockwell     HERNIORRHAPHY INGUINAL  12/21/2012    HERNIORRHAPHY INGUINAL;  Right Inguinal Hernia Repair with mesh ;  Surgeon: Mello Rockwell MD;  Location: [facility] OR     REPAIR ANEURYSM ASCENDING AORTA N/A 12/19/2017    REPAIR ANEURYSM ASCENDING AORTA;  REDO Median STERNOTOMY, FEMORAL CANNULATION, Lysis of adhesions, AORTIC ROOT VALVE HOMOGRAFT with 26mm x 7.0cm Cryolife Aortic valve and conduit - Dr Bowers     REPLACE VALVE AORTIC N/A 5/17/2016    REPLACE VALVE AORTIC - Dr Bowers     ROTATOR CUFF REPAIR RT/LT  11/10    Rt arthroscopy - Dr OSIRIS Andersen     SURGICAL HISTORY OF -   5/16    AVR, severe AR, aortic root repair     TRACHEOSTOMY PERCUTANEOUS  10/27/2017            Social History   Substance Use Topics     Smoking status: Former Smoker     Packs/day: 1.00     Years: 35.00     Quit date: 4/1/2016     Smokeless tobacco: Never Used      Comment: 4/2016     Alcohol use No     Family History   Problem Relation Age of Onset     Genetic Disorder Mother      Bone plateover growth Agustin. shoulder surgeries     Cancer Mother      brain cancer     Alzheimer Disease Father            Reviewed and updated as needed this visit by clinical staff  Tobacco  Allergies  Meds  Problems       Reviewed and updated as needed this visit by Provider  Allergies  Meds  Problems         ROS:  Constitutional, HEENT, cardiovascular, pulmonary, gi and gu systems are negative, except as otherwise noted.    OBJECTIVE:     BP 91/63 (BP Location: Left arm, Patient Position: Sitting, Cuff Size: Adult Regular)  Pulse 67  Temp 97.4  F (36.3  C) (Oral)  Ht 5' 7\" (1.702 m)  SpO2 " 95%  There is no height or weight on file to calculate BMI.  GENERAL: alert, seated in wheelchair,   HENT: ear canals and TM's normal, nose and mouth without ulcers or lesions  NECK: no adenopathy and no asymmetry, masses, or scars  RESP: decreased breath sounds throughout, scattered wheezes  CV: regular rate and rhythm, normal S1 S2, no S3 or S4, no murmur, click or rub, no peripheral edema and peripheral pulses strong  ABDOMEN: soft, nontender, no hepatosplenomegaly, no masses and bowel sounds normal  MS: no gross musculoskeletal defects noted, no edema    Diagnostic Test Results:  none     ASSESSMENT/PLAN:   1. Upper respiratory tract infection, unspecified type: with wheezing but decreased breath sounds, will treat for infection with azithromycin. Use albuterol for wheezing. If symptoms not improving or if worsening over the next week, return to clinic for further evaluation, CXR.  - azithromycin (ZITHROMAX) 250 MG tablet; Two tablets first day, then one tablet daily for four days.  Dispense: 6 tablet; Refill: 0  - albuterol (PROAIR HFA/PROVENTIL HFA/VENTOLIN HFA) 108 (90 Base) MCG/ACT Inhaler; Inhale 2 puffs into the lungs every 6 hours as needed for shortness of breath / dyspnea or wheezing  Dispense: 1 Inhaler; Refill: 0      Duy Garcia DO  [facility]

## 2018-08-04 NOTE — MR AVS SNAPSHOT
"              After Visit Summary   8/4/2018    Cricket Miguel    MRN: [id number]           Patient Information     Date Of Birth          1955        Visit Information        Provider Department      8/4/2018 11:20 AM Duy Garcia, DO [facility]        Today's Diagnoses     Upper respiratory tract infection, unspecified type    -  1       Follow-ups after your visit        Follow-up notes from your care team     Return in about 1 week (around 8/11/2018) for cough if not improving..      Who to contact     If you have questions or need follow up information about today's clinic visit or your schedule please contact [facility] directly at [phone].  Normal or non-critical lab and imaging results will be communicated to you by MyChart, letter or phone within 4 business days after the clinic has received the results. If you do not hear from us within 7 days, please contact the clinic through [employer] or phone. If you have a critical or abnormal lab result, we will notify you by phone as soon as possible.  Submit refill requests through [employer] or call your pharmacy and they will forward the refill request to us. Please allow 3 business days for your refill to be completed.          Additional Information About Your Visit        MyChart Information     [employer] gives you secure access to your electronic health record. If you see a primary care provider, you can also send messages to your care team and make appointments. If you have questions, please call your primary care clinic.  If you do not have a primary care provider, please call [phone] and they will assist you.        Care EveryWhere ID     This is your Care EveryWhere ID. This could be used by other organizations to access your [facility] medical records  [id number]        Your Vitals Were     Pulse Temperature Height Pulse Oximetry          67 97.4  F (36.3  C) (Oral) 5' 7\" (1.702 m) 95%         Blood " Pressure from Last 3 Encounters:   08/04/18 91/63   07/09/18 111/79   07/02/18 106/71    Weight from Last 3 Encounters:   05/25/18 178 lb (80.7 kg)   05/15/18 182 lb (82.6 kg)   03/13/18 182 lb (82.6 kg)              Today, you had the following     No orders found for display         Today's Medication Changes          These changes are accurate as of 8/4/18 11:42 AM.  If you have any questions, ask your nurse or doctor.               Start taking these medicines.        Dose/Directions    albuterol 108 (90 Base) MCG/ACT Inhaler   Commonly known as:  PROAIR HFA/PROVENTIL HFA/VENTOLIN HFA   Used for:  Upper respiratory tract infection, unspecified type   Started by:  Duy Garcia DO        Dose:  2 puff   Inhale 2 puffs into the lungs every 6 hours as needed for shortness of breath / dyspnea or wheezing   Quantity:  1 Inhaler   Refills:  0       azithromycin 250 MG tablet   Commonly known as:  ZITHROMAX   Used for:  Upper respiratory tract infection, unspecified type   Started by:  Duy Garcia, DO        Two tablets first day, then one tablet daily for four days.   Quantity:  6 tablet   Refills:  0            Where to get your medicines      These medications were sent to [facility] [address] [place] [facility] [address] [facility] [address]  [address] [facility] [address], [place] [address]     Phone:  [phone]     albuterol 108 (90 Base) MCG/ACT Inhaler    azithromycin 250 MG tablet                Primary Care Provider Office Phone # Fax #    Dipak Gordillo [phone]       [address] [place] [address]        Equal Access to Services     [facility] AH: Hadii jenise nova hadashtamika Sonico, waaxda luqadaha, qaybta kaalmada shantell, freddie waggoner. So [facility] [phone].    ATENCIÓN: Si habla español, tiene a mendiola disposición servicios gratuitos de asistencia lingüística. Llame al [phone].    We comply with applicable federal civil rights laws and [place] laws. We do not  discriminate on the basis of race, color, national origin, age, disability, sex, sexual orientation, or gender identity.            Thank you!     Thank you for choosing [facility]  for your care. Our goal is always to provide you with excellent care. Hearing back from our patients is one way we can continue to improve our services. Please take a few minutes to complete the written survey that you may receive in the mail after your visit with us. Thank you!             Your Updated Medication List - Protect others around you: Learn how to safely use, store and throw away your medicines at www.disposemymeds.org.          This list is accurate as of 8/4/18 11:42 AM.  Always use your most recent med list.                   Brand Name Dispense Instructions for use Diagnosis    albuterol 108 (90 Base) MCG/ACT Inhaler    PROAIR HFA/PROVENTIL HFA/VENTOLIN HFA    1 Inhaler    Inhale 2 puffs into the lungs every 6 hours as needed for shortness of breath / dyspnea or wheezing    Upper respiratory tract infection, unspecified type       aspirin 81 MG chewable tablet     60 tablet    1 tablet (81 mg) by Oral or Feeding Tube route daily    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, Cerebrovascular accident (CVA) due to other mechanism (H)       atorvastatin 40 MG tablet    LIPITOR    60 tablet    Take 1 tablet (40 mg) by mouth daily    Cerebrovascular accident (CVA) due to other mechanism (H)       azithromycin 250 MG tablet    ZITHROMAX    6 tablet    Two tablets first day, then one tablet daily for four days.    Upper respiratory tract infection, unspecified type       bumetanide 1 MG tablet    BUMEX    90 tablet    TAKE 1 TABLET BY MOUTH DAILY    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, Hypertension goal BP (blood pressure) < 140/90       Carboxymethylcellulose Sod PF 0.5 % Soln ophthalmic solution    REFRESH PLUS    1 Bottle    Place 1 drop Into  the left eye 3 times daily as needed (to flush debris from eye)    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       carvedilol 12.5 MG tablet    COREG    180 tablet    Take 0.5 tablets (6.25 mg) by mouth 2 times daily    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, Hypertension goal BP (blood pressure) < 140/90       famotidine 20 MG tablet    PEPCID    90 tablet    Take 1 tablet (20 mg) by mouth daily    Medication monitoring encounter       FLUoxetine 20 MG capsule    PROzac    90 capsule    Take 1 capsule (20 mg) by mouth daily    Major depressive disorder, single episode, moderate (H), Anxiety       iron 325 (65 Fe) MG tablet      Take 1 tablet by mouth every evening        KLOR-CON 20 MEQ CR tablet   Generic drug:  potassium chloride SA     60 tablet    TAKE 1 TAB BY MOUTH TWICE DAILY    Hypokalemia       LINZESS PO      Take 145 mcg by mouth every morning (before breakfast)        losartan 50 MG tablet    COZAAR    90 tablet    TAKE 1/2 TABLET BY MOUTH EVERY 12 HOURS    Hypertension goal BP (blood pressure) < 140/90, Congestive heart failure, NYHA class 2, unspecified congestive heart failure type (H), Cardiomyopathy, unspecified type (H)       mirtazapine 30 MG tablet    REMERON    90 tablet    Take 1 tablet (30 mg) by mouth At Bedtime    Mild single current episode of major depressive disorder (H), Anxiety       multivitamin, therapeutic with minerals Tabs tablet     30 each    Take 1 tablet by mouth daily    Cerebrovascular accident (CVA) due to other mechanism (H)       OLANZapine 2.5 MG tablet    zyPREXA    90 tablet    TAKE 1 TABLET BY MOUTH EVERY DAY    Cerebrovascular accident (CVA) due to other mechanism (H), Anxiety, Delirium due to another medical condition       order for DME     1 Device    Equipment being ordered: Semi-Electric Hospital Bed.  Length of Need: Lifetime    Cerebrovascular accident (CVA), unspecified mechanism (H), Chronic  systolic heart failure (H), Congestive heart failure, NYHA class 2, unspecified congestive heart failure type (H), COPD, mild (H), Acute bacterial endocarditis, S/P AVR (aortic valve replacement), H/O aortic root repair, Encephalopathy, Hemiparesis of right dominant side (H)       order for DME     1 Device    Equipment being ordered: walker with two front wheels. No seat or brakes. Mechelle Reagan at [facility]. fax- [phone]    CVA (cerebral vascular accident) (H)

## 2018-08-07 NOTE — TELEPHONE ENCOUNTER
Pt caregiver called to schedule a lab only appt.  No orders in chart - nothing noted in previous result notes.    Talked to caregiver and she stated that she wanted all labs from April repeated.  I advised her that some were repeated in May and that I would need an order from Dr. Gordillo who is out of the office all week.     I told patient we would talk to TS next week and call her back when lab orders were completed.

## 2018-08-07 NOTE — TELEPHONE ENCOUNTER
Patient's wife called requesting orders and to assist in scheduling IV antibiotics for patients upcoming dental implant surgery. Dr. Garzon ok'd these antibiotics (see 7/5/18 [facility] encounter notes). RN placed orders for ampicillin 2000mg and vanco 2000mg (IV). RN called patient's wife and left VM advising patient's wife to return call to confirm date and time of dental apt.    6/8/18 telephone encounter   In the past pt has had IV Ampicillin 2,000mg and IV Vancomycin 2,000 mg.

## 2018-08-15 NOTE — TELEPHONE ENCOUNTER
Phlegmy cough is still there, but is better. Pt is still going to therapy 2x weekly and they noted they do not have as much stamina as before.     Scheduled as KW had advised for next week and also advised to use humidity, fluids (at times warm fluids to help dissipate the phlegm), etc. Advised to call with any concerns or increase in sx before appt next week.     SO agreed with the plan.    Florence Castro RN  [place] [facility]

## 2018-08-22 NOTE — PATIENT INSTRUCTIONS
Patient advised to increase Miralax dosage from 1 cap full to 1.5 cap full    Patient advised to ask stroke specialist if there are any other medications to try, or modifications to make to medications. Patient also advised to ask about other facilities or way to increase physical capabilities. Advised to ask about any potential aspiration and if the stroke specialist would like to examine anything in regards to that.     Patient advised to use albuterol every 4 hours as needed to help with wheezing.  [facility]                        To reach your care team during and after hours:   [phone]  To reach our pharmacy:        [phone]    Clinic Hours                        Our clinic hours are:    Monday   7:30 am to 7:00 pm                  Tuesday through Friday 7:30 am to 5:00 pm                             Saturday   8:00 am to 12:00 pm      Sunday   Closed      Pharmacy Hours                        Our pharmacy hours are:    Monday   8:30 am to 7:00 pm       Tuesday to Friday  8:30 am to 6:00 pm                       Saturday    9:00 am to 1:00 pm              Sunday    Closed              There is also information available at our web site:  [email or web address]    If your provider ordered any lab tests and you do not receive the results within 10 business days, please call the clinic.    If you need a medication refill please contact your pharmacy.  Please allow 2-3 business days for your refill to be completed.    Our clinic offers telephone visits and e visits.  Please ask one of your team members to explain more.      Use [employer] (secure email communication and access to your chart) to send your primary care provider a message or make an appointment. Ask someone on your Team how to sign up for [facility].  Immunizations                      Immunization History   Administered Date(s) Administered     Influenza Vaccine IM 3yrs+ 4 Valent IIV4 10/28/2017     Pneumococcal 23 valent 11/04/2017      TD (ADULT, 7+) 04/14/1999        Health Maintenance                         Health Maintenance Due   Topic Date Due     Heart Failure Action Plan Reviewed Every 3 Years  1955     Wellness Visit with your Primary Provider - yearly  1955     COPD ACTION PLAN Q1 YR  1955     Colon Cancer Screening - FIT Test - yearly  03/16/1963     Microalbumin Lab - yearly  02/07/2013

## 2018-08-22 NOTE — TELEPHONE ENCOUNTER
Reason for Call:  Other Forms, etc.    Detailed comments: Pt and his caregiver/ Significant other called this morning to confirm the pt's appt. Today, as well as to let Dr. Gordillo know that they are in need of a form that states that the pt was competent  When he signed his daughter off as his power of . Pt will have more information when they come in for today's appt. Please give pt a call if there are any questions. Thank you.    Phone Number Patient can be reached at: Home number on file [phone] (home)    Best Time:     Can we leave a detailed message on this number? YES    Call taken on 8/22/2018 at 10:18 AM by Esperanza Cartwright

## 2018-08-22 NOTE — MR AVS SNAPSHOT
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        S/P AVR (aortic valve replacement)        H/O aortic root repair        Chronic systolic heart failure (H)        Cardiomyopathy, unspecified type (H)        COPD, mild (H)        Hypertension goal BP (blood pressure) < 140/90        Hyperlipidemia LDL goal <70        Major depressive disorder, single episode, moderate (HCC)        Acute URI        Medication monitoring encounter          Care Instructions    Patient advised to increase Miralax dosage from 1 cap full to 1.5 cap full    Patient advised to ask stroke specialist if there are any other medications to try, or modifications to make to medications. Patient also advised to ask about other facilities or way to increase physical capabilities. Advised to ask about any potential aspiration and if the stroke specialist would like to examine anything in regards to that.     Patient advised to use albuterol every 4 hours as needed to help with wheezing.  [facility]                        To reach your care team during and after hours:   [phone]  To reach our pharmacy:        [phone]    Clinic Hours                        Our clinic hours are:    Monday   7:30 am to 7:00 pm                  Tuesday through Friday 7:30 am to 5:00 pm                             Saturday   8:00 am to 12:00 pm      Sunday   Closed      Pharmacy Hours                        Our pharmacy hours are:    Monday   8:30 am to 7:00 pm       Tuesday to Friday  8:30 am to 6:00 pm                       Saturday    9:00 am to  1:00 pm              Sunday    Closed              There is also information available at our web site:  [email or web address]    If your provider ordered any lab tests and you do not receive the results within 10 business days, please call the clinic.    If you need a medication refill please contact your pharmacy.  Please allow 2-3 business days for your refill to be completed.    Our clinic offers telephone visits and e visits.  Please ask one of your team members to explain more.      Use Coverityt (secure email communication and access to your chart) to send your primary care provider a message or make an appointment. Ask someone on your Team how to sign up for 4Tech.  Immunizations                      Immunization History   Administered Date(s) Administered     Influenza Vaccine IM 3yrs+ 4 Valent IIV4 10/28/2017     Pneumococcal 23 valent 11/04/2017     TD (ADULT, 7+) 04/14/1999        Health Maintenance                         Health Maintenance Due   Topic Date Due     Heart Failure Action Plan Reviewed Every 3 Years  1955     Wellness Visit with your Primary Provider - yearly  1955     COPD ACTION PLAN Q1 YR  1955     Colon Cancer Screening - FIT Test - yearly  03/16/1963     Microalbumin Lab - yearly  02/07/2013               Follow-ups after your visit        Follow-up notes from your care team     Return in about 2 weeks (around 9/5/2018), or if symptoms worsen or fail to improve, for Medication Recheck Visit.      Your next 10 appointments already scheduled     Sep 04, 2018 12:20 PM CDT   [facility]    [address] [place] [id number]   [phone]            Sep 10, 2018  9:30 AM CDT   Level 3 with  INFUSION CHAIR 18   [facility] ([facility])    [facility]  [address]  [place] [address]   [phone]             Sep 26, 2018  1:00 PM CDT   LAB with [facility]   [facility] [place] ([facility])    [address]  [place]  [place] [address]   [phone]           Please do not eat 10-12 hours before your appointment if you are coming in fasting for labs on lipids, cholesterol, or glucose (sugar). This does not apply to pregnant women. Water, hot tea and black coffee (with nothing added) are okay. Do not drink other fluids, diet soda or chew gum.            Sep 26, 2018  1:50 PM CDT   Core Return with BIBI Bettencourt CNP   [facility] ([facility])    [address]  [place] [address]   [phone] OPT 2              Future tests that were ordered for you today     Open Future Orders        Priority Expected Expires Ordered    Basic metabolic panel Routine 9/27/2018 8/28/2019 8/28/2018    Follow-Up with [facility] Routine 9/27/2018 8/28/2019 8/28/2018            Who to contact     If you have questions or need follow up information about today's clinic visit or your schedule please contact [facility] directly at [phone].  Normal or non-critical lab and imaging results will be communicated to you by Pymetricshart, letter or phone within 4 business days after the clinic has received the results. If you do not hear from us within 7 days, please contact the clinic through Pymetricshart or phone. If you have a critical or abnormal lab result, we will notify you by phone as soon as possible.  Submit refill requests through Nopsec or call your pharmacy and they will forward the refill request to us. Please allow 3 business days for your refill to be completed.          Additional Information About Your Visit        Nopsec Information     Nopsec gives you secure access to your electronic health record. If you see a primary care provider, you can also send messages to your care team and  "make appointments. If you have questions, please call your primary care clinic.  If you do not have a primary care provider, please call [phone] and they will assist you.        Care EveryWhere ID     This is your Care EveryWhere ID. This could be used by other organizations to access your [facility] medical records  [id number]        Your Vitals Were     Pulse Temperature Height Pulse Oximetry BMI (Body Mass Index)       62 97.2  F (36.2  C) (Oral) 5' 7\" (1.702 m) 94% 27.93 kg/m2        Blood Pressure from Last 3 Encounters:   08/28/18 100/74   08/22/18 118/74   08/04/18 91/63    Weight from Last 3 Encounters:   08/22/18 178 lb 4.8 oz (80.9 kg)   05/25/18 178 lb (80.7 kg)   05/15/18 182 lb (82.6 kg)              Today, you had the following     No orders found for display         Today's Medication Changes          These changes are accurate as of 8/22/18 11:59 PM.  If you have any questions, ask your nurse or doctor.               Start taking these medicines.        Dose/Directions    azithromycin 250 MG tablet   Commonly known as:  ZITHROMAX   Used for:  Acute URI   Started by:  Dipak Gordillo MD        2 tabs day 1 and the 1 tab daily for 4 more days   Quantity:  6 tablet   Refills:  0         Stop taking these medicines if you haven't already. Please contact your care team if you have questions.     order for DME   Stopped by:  Dipak Gordillo MD           order for DME   Stopped by:  Dipak Gordillo MD                Where to get your medicines      These medications were sent to [facility] - [place], [place] - [address]  [address], [place] [address]     Phone:  [phone]     azithromycin 250 MG tablet                Primary Care Provider Office Phone # Fax #    Dipak Gordillo [phone]       [address] [place] [address]        Equal Access to Services     DANIEL BILLS AH: Cisco nova hadasho Soomaali, waaxda luqadaha, qaybta kaalmada adeegyada, freddie hobson " hanna jonathonjanelle cordellmorelia laPaulaadinesh ah. So [facility] [phone].    ATENCIÓN: Si habla shanell, tiene a mendiola disposición servicios gratuitos de asistencia lingüística. Brandin al [phone].    We comply with applicable federal civil rights laws and [place] laws. We do not discriminate on the basis of race, color, national origin, age, disability, sex, sexual orientation, or gender identity.            Thank you!     Thank you for choosing [facility]  for your care. Our goal is always to provide you with excellent care. Hearing back from our patients is one way we can continue to improve our services. Please take a few minutes to complete the written survey that you may receive in the mail after your visit with us. Thank you!             Your Updated Medication List - Protect others around you: Learn how to safely use, store and throw away your medicines at www.disposemymeds.org.          This list is accurate as of 8/22/18 11:59 PM.  Always use your most recent med list.                   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, Cerebrovascular accident (CVA) due to other mechanism (H)       atorvastatin 40 MG tablet    LIPITOR    60 tablet    Take 1 tablet (40 mg) by mouth daily    Cerebrovascular accident (CVA) due to other mechanism (H)       azithromycin 250 MG tablet    ZITHROMAX    6 tablet    2 tabs day 1 and the 1 tab daily for 4 more days    Acute URI       bumetanide 1 MG tablet    BUMEX    90 tablet    TAKE 1 TABLET BY MOUTH DAILY    Endocarditis and heart  valve disorders in diseases classified elsewhere, Hypertension goal BP (blood pressure) < 140/90       Carboxymethylcellulose Sod PF 0.5 % Soln ophthalmic solution    REFRESH PLUS    1 Bottle    Place 1 drop Into the left eye 3 times daily as needed (to flush debris from eye)    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       carvedilol 12.5 MG tablet    COREG    180 tablet    Take 0.5 tablets (6.25 mg) by mouth 2 times daily    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, Hypertension goal BP (blood pressure) < 140/90       famotidine 20 MG tablet    PEPCID    90 tablet    Take 1 tablet (20 mg) by mouth daily    Medication monitoring encounter       FLUoxetine 20 MG capsule    PROzac    90 capsule    Take 1 capsule (20 mg) by mouth daily    Major depressive disorder, single episode, moderate (H), Anxiety       iron 325 (65 Fe) MG tablet      Take 1 tablet by mouth every evening        losartan 50 MG tablet    COZAAR    90 tablet    TAKE 1/2 TABLET BY MOUTH EVERY 12 HOURS    Hypertension goal BP (blood pressure) < 140/90, Congestive heart failure, NYHA class 2, unspecified congestive heart failure type (H), Cardiomyopathy, unspecified type (H)       mirtazapine 30 MG tablet    REMERON    90 tablet    Take 1 tablet (30 mg) by mouth At Bedtime    Mild single current episode of major depressive disorder (H), Anxiety       multivitamin, therapeutic with minerals Tabs tablet     30 each    Take 1 tablet by mouth daily    Cerebrovascular accident (CVA) due to other mechanism (H)       OLANZapine 2.5 MG tablet    zyPREXA    90 tablet    TAKE 1 TABLET BY MOUTH EVERY DAY    Cerebrovascular accident (CVA) due to other mechanism (H), Anxiety, Delirium due to another medical condition

## 2018-08-22 NOTE — LETTER
[facility] - [address] [place] [address]                                                                                                       [phone]    August 22, 2018    Cricket Miguel  [address]  [facility] [address]      To Whom it May Concern:    The above patient in my opinion is competent to make decisions and has been competent dating back to 12/11/17     Please contact me with questions or concerns.      Sincerely,          Dipak Gordillo M.D.

## 2018-08-22 NOTE — PROGRESS NOTES
SUBJECTIVE:   Cricket Miguel is a 63 year old male who presents to clinic today for the following health issues:    Need letter that states that the patient was competent when signing power of  paperwork faxed to [phone]    Follow up on cough - was better while on antibiotic but now bad again - lack of stamina    Seeing stroke specialist tomorrow    CHF/Cardiomyopathy/Hypertension/Hyperlipidemia:  Managed by atorvastatin 40 mg, Bumex 1 mg, carvedilol 12.5 mg, and losartan 50 mg.    BP Readings from Last 3 Encounters:   08/22/18 118/74   08/04/18 91/63   07/09/18 111/79     Recent Labs   Lab Test  04/25/18   0941  10/13/17   0357 04/03/16 02/07/12   1214   CHOL  107   --   105*  198   HDL  39*   --   25  47   LDL  41   --   56  123   TRIG  133  265*  120  138   CHOLHDLRATIO   --    --    --   4.2     Wt Readings from Last 4 Encounters:   08/22/18 80.9 kg (178 lb 4.8 oz)   05/25/18 80.7 kg (178 lb)   05/15/18 82.6 kg (182 lb)   03/13/18 82.6 kg (182 lb)     Cough/COPD: Pt's cough was improved while taking antibiotics, but has since worsened. Pt's cough is persistent and productive. Daughter states the production is very wet and sticky but she can't see what color it is because the patient just swallows it again. Pt has been wheezing frequently but intermittently and persistently Pt denies fever, chills, sweats, rhinorrhea, sneezing.     CVA: Improved. Patient reports having right sided weakness and right sided numbness. Patient can't feel much of anything on the right side of his body. He has an appointment with a stroke specialist tomorrow, 08/23/2018.     Depression/Anxiety: Stable. Daughter states that he is about the same. Managed by fluoxetine 20 mg and olanzapine 2.5 mg.     Stamina:  Pt doesn't have much stamina, he is sleeping quite a lot and will fall asleep quite often during the day in the car or at home sitting down.     Sleep: Daughter states pt is sleeping well at night, about 14 hours.      Bladder: Pt is having a hard time both starting and stopping urination.     Bowel Movements: Patient uses 1 cap full of miralax daily, hasn't been successful.     Problem list and histories reviewed & adjusted, as indicated.  Additional history: as documented    Labs reviewed in EPIC    Reviewed and updated as needed this visit by clinical staff  Tobacco  Allergies  Meds  Med Hx  Surg Hx  Fam Hx  Soc Hx      Reviewed and updated as needed this visit by Provider         BP Readings from Last 3 Encounters:   08/22/18 118/74   08/04/18 91/63   07/09/18 111/79     Wt Readings from Last 4 Encounters:   08/22/18 80.9 kg (178 lb 4.8 oz)   05/25/18 80.7 kg (178 lb)   05/15/18 82.6 kg (182 lb)   03/13/18 82.6 kg (182 lb)       Health Maintenance    Health Maintenance Due   Topic Date Due     HF ACTION PLAN Q3 YR  1955     WELLNESS VISIT Q1 YR  1955     COPD ACTION PLAN Q1 YR  1955     FIT Q1 YR  03/16/1963     MICROALBUMIN Q1 YEAR  02/07/2013       Current Problem List    Patient Active Problem List   Diagnosis     Tobacco use disorder     Hypertension goal BP (blood pressure) < 140/90     Disease of lung     Major depressive disorder, single episode, moderate (HCC)     Advance Care Planning     Psoriasis     COPD, mild (H)     CHF (congestive heart failure), NYHA class II (H)     AR (aortic regurgitation)     AI (aortic insufficiency)     Aortic root dilatation (H)     Hyperlipidemia LDL goal <70     Health Care Home     Status post coronary angiogram     Cardiomyopathy (H)     S/P AVR (aortic valve replacement)     H/O aortic root repair     Anemia     Endocarditis     Encephalopathy     Abscess of aortic root     CVA (cerebral vascular accident) (H)     Anxiety     Chronic systolic heart failure (H)     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     SNHL (sensorineural hearing loss)     Hx of aortic root repair     Prophylactic antibiotic     Elevated PSA      Constipation       Past Medical History    Past Medical History:   Diagnosis Date     Abscess of aortic root 10/06/2017     AI (aortic insufficiency)     severe     Anxiety      Aortic root dilatation (H)      AR (aortic regurgitation)     severe     Cardiomyopathy (H)      CHF (congestive heart failure), NYHA class II (H)      Constipation     Dr Mcghee     COPD, mild (H)     spirometry 12/16     CVA (cerebral vascular accident) (H) 10/06/2017    septic emboli - MSSA - cerebellum, Left MCA, Rt Occipital, Aphasia, Left facial paralysis, Right>Left UE/LE weakness, Left visual field cut, moderate to severe encephalopathy, cognitive dysfunction, word finding     Depression 10/06/2017     Elevated PSA 05/2018    PSA = 8, Dr Garcia     Heart murmur      Hyperlipidemia LDL goal <70 10/31/2010     Hypertension goal BP (blood pressure) < 140/90      Inguinal hernia      left lung nodule  1/29/2008     Major depressive disorder, single episode, moderate (H)      Psoriasis childhood     SNHL (sensorineural hearing loss)     Left, secondary to CVA     Tobacco use disorder        Past Surgical History    Past Surgical History:   Procedure Laterality Date     ARTHROSCOPY KNEE INCISION AND DRAINAGE Left 10/8/2017    Arthroscopic Incision and Drainage of Left Knee - Dr Munoz     HC SHOULDER ARTHROSCOPY, DX  2001    Arthroscopy, Shoulder RT Dr OSIRIS Andersen     HC SHOULDER ARTHROSCOPY, DX  1/04    LT arthroscopy Dr OSIRIS Andersen     HERNIA REPAIR, UMBILICAL  1/08    incarcerated - dr rockwell     HERNIORRHAPHY INGUINAL  12/21/2012    HERNIORRHAPHY INGUINAL;  Right Inguinal Hernia Repair with mesh ;  Surgeon: Mello Rockwell MD;  Location: [facility] OR     REPAIR ANEURYSM ASCENDING AORTA N/A 12/19/2017    REPAIR ANEURYSM ASCENDING AORTA;  REDO Median STERNOTOMY, FEMORAL CANNULATION, Lysis of adhesions, AORTIC ROOT VALVE HOMOGRAFT with 26mm x 7.0cm Cry[employer]ife Aortic valve and conduit - Dr Bowers     REPLACE VALVE AORTIC N/A 5/17/2016    REPLACE  VALVE AORTIC - Dr Bowers     ROTATOR CUFF REPAIR RT/LT  11/10    Rt arthroscopy - Dr OSIRIS Andersen     SURGICAL HISTORY OF -   5/16    AVR, severe AR, aortic root repair     TRACHEOSTOMY PERCUTANEOUS  10/27/2017            Current Medications    Current Outpatient Prescriptions   Medication Sig Dispense Refill     albuterol (PROAIR HFA/PROVENTIL HFA/VENTOLIN HFA) 108 (90 Base) MCG/ACT Inhaler Inhale 2 puffs into the lungs every 6 hours as needed for shortness of breath / dyspnea or wheezing 1 Inhaler 0     aspirin 81 MG chewable tablet 1 tablet (81 mg) by Oral or Feeding Tube route daily 60 tablet 1     atorvastatin (LIPITOR) 40 MG tablet Take 1 tablet (40 mg) by mouth daily 60 tablet 1     azithromycin (ZITHROMAX) 250 MG tablet 2 tabs day 1 and the 1 tab daily for 4 more days 6 tablet 0     bumetanide (BUMEX) 1 MG tablet TAKE 1 TABLET BY MOUTH DAILY 90 tablet 1     Carboxymethylcellulose Sod PF (REFRESH PLUS) 0.5 % SOLN ophthalmic solution Place 1 drop Into the left eye 3 times daily as needed (to flush debris from eye) 1 Bottle 1     carvedilol (COREG) 12.5 MG tablet Take 0.5 tablets (6.25 mg) by mouth 2 times daily 180 tablet 1     famotidine (PEPCID) 20 MG tablet Take 1 tablet (20 mg) by mouth daily 90 tablet 1     Ferrous Sulfate (IRON) 325 (65 FE) MG tablet Take 1 tablet by mouth every evening       FLUoxetine (PROZAC) 20 MG capsule Take 1 capsule (20 mg) by mouth daily 90 capsule 3     KLOR-CON 20 MEQ CR tablet TAKE 1 TAB BY MOUTH TWICE DAILY 60 tablet 3     losartan (COZAAR) 50 MG tablet TAKE 1/2 TABLET BY MOUTH EVERY 12 HOURS 90 tablet 3     mirtazapine (REMERON) 30 MG tablet Take 1 tablet (30 mg) by mouth At Bedtime 90 tablet 3     multivitamin, therapeutic with minerals (THERA-VIT-M) TABS tablet Take 1 tablet by mouth daily 30 each 0     OLANZapine (ZYPREXA) 2.5 MG tablet TAKE 1 TABLET BY MOUTH EVERY DAY 90 tablet 1       Allergies    Allergies   Allergen Reactions     Lisinopril Swelling     One-sided  facial swelling after being on lisinopril/HCTZ for one week.        Immunizations    Immunization History   Administered Date(s) Administered     Influenza Vaccine IM 3yrs+ 4 Valent IIV4 10/28/2017     Pneumococcal 23 valent 11/04/2017     TD (ADULT, 7+) 04/14/1999       Family History    Family History   Problem Relation Age of Onset     Genetic Disorder Mother      Bone plateover growth Agustin. shoulder surgeries     Cancer Mother      brain cancer     Alzheimer Disease Father        Social History    Social History     Social History     Marital status:      Spouse name: N/A     Number of children: 3     Years of education: 12     Occupational History     Not on file.     Social History Main Topics     Smoking status: Former Smoker     Packs/day: 1.00     Years: 35.00     Quit date: 4/1/2016     Smokeless tobacco: Never Used      Comment: 4/2016     Alcohol use No     Drug use: Yes      Comment: marijuana- daily previously     Sexual activity: Yes     Partners: Female     Other Topics Concern     Caffeine Concern Yes     3 cups     Sleep Concern No     Special Diet No     trying to watch salt     Exercise Yes     walking     Seat Belt No     Parent/Sibling W/ Cabg, Mi Or Angioplasty Before 65f 55m? No     Social History Narrative       All above reviewed and updated, all stable unless otherwise noted    Recent labs reviewed    ROS:  Constitutional, HEENT, cardiovascular, pulmonary, GI, , musculoskeletal, neuro, skin, endocrine and psych systems are negative, except as in HPI or otherwise noted     This document serves as a record of the services and decisions personally performed and made by Dipak Gordillo MD [facility]. It was created on their behalf by Walter Gordillo, a trained medical scribe. The creation of this document is based the provider's statements to the medical scribe.  Walter Gordillo August 22, 2018 5:23 PM    OBJECTIVE:                                                    /74  Pulse 62  Temp  "97.2  F (36.2  C) (Oral)  Ht 1.702 m (5' 7\")  Wt 80.9 kg (178 lb 4.8 oz)  SpO2 94%  BMI 27.93 kg/m2  Body mass index is 27.93 kg/(m^2).  GENERAL: healthy, alert and no distress  EYES: Eyes grossly normal to inspection, extraocular movements - intact, and PERRL  HENT: ear canals- normal; TMs- normal upon viewing with otoscope; Nose- normal; Mouth- no ulcers, no lesions upon viewing with otoscope  NECK: no tenderness, no adenopathy, no asymmetry, no masses, no stiffness; thyroid- normal to palpation  RESP: lungs clear to auscultation - no rales, no rhonchi, no wheezes  CV: regular rates and rhythm, normal S1 S2, no S3 or S4 and no murmur, no click or rub -  ABDOMEN: soft, no tenderness, no  hepatosplenomegaly, no masses, normal bowel sounds  MS: extremities- no gross deformities noted, no edema  SKIN: no suspicious lesions, no rashes to visible skin  NEURO: strength and tone- normal, sensory exam- grossly normal, mentation- intact, speech- normal, reflexes- symmetric  BACK: no CVA tenderness, no paralumbar tenderness  PSYCH: Alert and oriented times 3; speech- coherent , normal rate and volume; able to articulate logical thoughts, able to abstract reason, no tangential thoughts, no hallucinations or delusions, affect- normal  LYMPHATICS: ant. cervical- normal, post. cervical- normal, axillary- normal, supraclavicular- normal, inguinal- normal    DIAGNOSTICS/PROCEDURES:                                                      none      ASSESSMENT/PLAN:                                                        ICD-10-CM    1. Cerebrovascular accident (CVA), unspecified mechanism (H) I63.9    2. 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 I39    3. S/P AVR (aortic valve replacement) Z95.2    4. H/O aortic root repair Z98.890    5. Chronic systolic heart failure (H) I50.22    6. Cardiomyopathy, unspecified type (H) I42.9    7. COPD, mild (H) J44.9    8. Hypertension goal BP (blood pressure) < " 140/90 I10    9. Major depressive disorder, single episode, moderate (HCC) F32.1    10. Acute URI J06.9 azithromycin (ZITHROMAX) 250 MG tablet     Discussed treatment/modality options, including risk and benefits, he desires Patient advised to increase Miralax dosage from 1 cap full to 1.5 cap full. Patient advised to ask stroke specialist if there are any other medications to try, or modifications to make to medications. Patient also advised to ask about other facilities or way to increase physical capabilities. Advised to ask about any potential aspiration and if the stroke specialist would like to examine anything in regards to that. Patient advised to use albuterol every 4 hours as needed to help with wheezing.. All diagnosis above reviewed and noted above, otherwise stable.  See [employer] orders for further details.      No Follow-up on file.    Health Maintenance Due   Topic Date Due     HF ACTION PLAN Q3 YR  1955     WELLNESS VISIT Q1 YR  1955     COPD ACTION PLAN Q1 YR 1955     FIT Q1 YR 03/16/1963     MICROALBUMIN Q1 YEAR  02/07/2013       See Patient Instructions    The information in this document, created by the medical scribe for me, accurately reflects the services I personally performed and the decisions made by me. I have reviewed and approved this document for accuracy prior to leaving the patient care area.  5:23 PM, 08/22/18           Dipak Gordillo MD [address]  [phone] Fax

## 2018-08-22 NOTE — PROGRESS NOTES
"Received VM from SO, Steffanie, asking about K Barb. Called and spoke to Steffanie. She would like to know when pt can stop taking potassium. Informed that since pt is on Bumex, potassium would be needed to keep his level WNL. Expressed understanding. Steffanie did report that the K Barb does upset his stomach, suggested giving with food. Steffanie also asking when pt can stop taking Bumex. She reports that it is difficult to weigh pt as he can not stand (d/t CVA) and is WC bound. She is able to weigh him occ \"with a lot of help. I weigh him in the chair than we have to get him out of the chair and weigh the WC. Its hard to weigh him every day.\" Steffanie reports she gauges his HF sx/water retention by his sx, edema and how his clothes are fitting. Steffanie reports his wts have been stable around 170-175#, no edema or dyspnea. Informed Steffanie that pt may be doing well from a HF stand point as Bumex is working to keep extra fluid off. They are on their way to see Dr Gordillo regarding increased phlegm and difficulty controlling secretions. Steffanie did ask if Dr Gordillo could address the potassium and Bumex. Writer told steffanie that he may defer those meds to [facility] but she is more than welcome to discuss anything they feel necessary.  Will review with Ant Olivera CNP regarding continuing Bumex and K Barb. Pt does not have an appt in [facility] coming up or cardiology appt scheduled.  Stephanie Hicks RN 1:32 PM 08/22/18    "

## 2018-08-22 NOTE — TELEPHONE ENCOUNTER
Patient has OV @ 420 today, 08/22/2018    Routing to PCP as an FYI    Joycelyn Jones RN  [facility]

## 2018-08-23 NOTE — PROGRESS NOTES
Called and spoke to Meghna, pt's SO. She reports Dr Gordillo suggested to cut Bumex in half. Meghna wants Ant' opinion. Informed her that Ant would like an OV w/labs, Meghna in agreement. Appt made for 8/28 w/Ant. Pt has lab only appt 8/28 at 0915 at [facility]. Writer called to [facility] to see which labs are ordered and turn around time for results: CMP, CBC & sed rate are ordered. Lab results turn around time is 2-3 days. Clinic ok with pt coming in on fri 8/24 for labs. Meghna called and VM left to see if she would be willingly to take pt in on Friday for labs instead of Tuesday d/t turn around time.   Stephanie Hicks RN 10:15 AM 08/23/18

## 2018-08-28 NOTE — MR AVS SNAPSHOT
After Visit Summary   8/28/2018    Cricket Miguel    MRN: [id number]           Patient Information     Date Of Birth          1955        Visit Information        Provider Department      8/28/2018 3:50 PM Candice Olivera APRN CNP [facility]        Today's Diagnoses     Chronic systolic congestive heart failure (H)        Hypokalemia        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        Hypertension goal BP (blood pressure) < 140/90          Care Instructions    Call the C.O.R.E. nurse for any questions or concerns:  [phone]    1.  Medication changes from today: decrease bumex to 1/2 tab daily, and decrease potassium to 1 tab daily.     2. Follow up plan: in one rio. Try musinex     3.  Weigh yourself daily and write it down.    4.  Call CORE nurse if your weight is up more than 2 pounds in one day or 5 pounds in one week.    5.  Call [facility] nurse if you feel more short of breath, have more abdominal bloating, or leg swelling.    6.  Continue low sodium diet (less than 2000 mg daily). If you eat less salt, you will retain less fluid.    7.  Alcohol can weaken your heart further. You should avoid alcohol or limit its use to special times, such as a holiday or birthday.     8.  Do NOT take Aleve (naproxen) or Advil (ibuprofen) without talking to your doctor first.     9.  Lab Results:    Component      Latest Ref Rng & Units 8/24/2018   Sodium      133 - 144 mmol/L 143   Potassium      3.4 - 5.3 mmol/L 4.2   Chloride      94 - 109 mmol/L 107   Carbon Dioxide      20 - 32 mmol/L 28   Anion Gap      3 - 14 mmol/L 8   Glucose      70 - 99 mg/dL 122 (H)   Urea Nitrogen      7 - 30 mg/dL 24   Creatinine      0.66 - 1.25 mg/dL 1.12   GFR Estimate      >60 mL/min/1.7m2 66   GFR Estimate If Black      >60 mL/min/1.7m2 80   Calcium      8.5 - 10.1 mg/dL 9.3   Bilirubin Total      0.2 - 1.3 mg/dL 0.4   Albumin      3.4 -  5.0 g/dL 3.5   Protein Total      6.8 - 8.8 g/dL 7.6   Alkaline Phosphatase      40 - 150 U/L 126   ALT      0 - 70 U/L 17   AST      0 - 45 U/L 14   N-Terminal Pro Bnp      0 - 125 pg/mL 294 (H)     [facility]: Cardiomyopathy, Optimization, Rehabilitation, Education  The [facility] is a heart failure specialty clinic within the [facility] where you will work with your cardiologist, nurse practitioners, physician assistants and registered nurses who specialize in heart failure care. They are dedicated to helping patients with heart failure to carefully adjust medications, receive education, and learn who and when to call if symptoms develop. They specialize in helping you better understand your condition, slow the progression of your disease, improve the length and quality of your life, help you detect future heart problems before they become life threatening, and avoid hospitalizations.            Follow-ups after your visit        Additional Services     Follow-Up with [facility]                 Your next 10 appointments already scheduled     Sep 04, 2018 12:20 PM CDT   SHORT with Dipak Gordillo MD   [facility]    [address] [place] [address]   [phone]            Sep 10, 2018  9:30 AM CDT   Level 3 with  INFUSION CHAIR 18   [facility] ([facility])    [facility]  [address]  [facility] [address]   [phone]              Future tests that were ordered for you today     Open Future Orders        Priority Expected Expires Ordered    Basic metabolic panel Routine 9/27/2018 8/28/2019 8/28/2018    Follow-Up with [facility] Routine 9/27/2018 8/28/2019 8/28/2018            Who to contact     If you have questions or need follow up information about today's clinic visit or your schedule please contact [facility]  "[facility] directly at [phone].  Normal or non-critical lab and imaging results will be communicated to you by MyChart, letter or phone within 4 business days after the clinic has received the results. If you do not hear from us within 7 days, please contact the clinic through [employer] or phone. If you have a critical or abnormal lab result, we will notify you by phone as soon as possible.  Submit refill requests through [employer] or call your pharmacy and they will forward the refill request to us. Please allow 3 business days for your refill to be completed.          Additional Information About Your Visit        [employer] Information     [employer] gives you secure access to your electronic health record. If you see a primary care provider, you can also send messages to your care team and make appointments. If you have questions, please call your primary care clinic.  If you do not have a primary care provider, please call [phone] and they will assist you.        Care EveryWhere ID     This is your Care EveryWhere ID. This could be used by other organizations to access your [place] medical records  [id number]        Your Vitals Were     Pulse Height Pulse Oximetry             62 1.702 m (5' 7\") 93%          Blood Pressure from Last 3 Encounters:   08/28/18 100/74   08/22/18 118/74   08/04/18 91/63    Weight from Last 3 Encounters:   08/22/18 80.9 kg (178 lb 4.8 oz)   05/25/18 80.7 kg (178 lb)   05/15/18 82.6 kg (182 lb)              We Performed the Following     Follow-[facility] with [facility]          Today's Medication Changes          These changes are accurate as of 8/28/18  5:00 PM.  If you have any questions, ask your nurse or doctor.               These medicines have changed or have updated prescriptions.        Dose/Directions    bumetanide 1 MG tablet   Commonly known as:  BUMEX   This may have changed:  See the new instructions.   Used for:  Chronic systolic congestive heart " failure (H)   Changed by:  Candice Olivera APRN CNP        1/2 tab daily or as directed   Quantity:  90 tablet   Refills:  3       potassium chloride SA 20 MEQ CR tablet   Commonly known as:  KLOR-CON   This may have changed:  See the new instructions.   Used for:  Hypokalemia   Changed by:  Candice Olivera APRN CNP        Dose:  20 mEq   Take 1 tablet (20 mEq) by mouth daily   Quantity:  30 tablet   Refills:  3            Where to get your medicines      These medications were sent to [facility] - [place], [place] - [address]  [address] [address] [address] [address]     Phone:  [phone]     bumetanide 1 MG tablet    potassium chloride SA 20 MEQ CR tablet                Primary Care Provider Office Phone # Fax #    Dipak Gordillo [phone] [address] [facility] [address]        Equal Access to Services     [facility]: Hadii aad ku hadasho Soomaali, waaxda luqadaha, qaybta kaalmada adeegyada, waxay paulinoin hayradha rodríguez . So [facility] [phone].    ATENCIÓN: Si habla español, tiene a mendiola disposición servicios gratuitos de asistencia lingüística. [facility] [phone].    We comply with applicable federal civil rights laws and [place] laws. We do not discriminate on the basis of race, color, national origin, age, disability, sex, sexual orientation, or gender identity.            Thank you!     Thank you for choosing [facility]  for your care. Our goal is always to provide you with excellent care. Hearing back from our patients is one way we can continue to improve our services. Please take a few minutes to complete the written survey that you may receive in the mail after your visit with us. Thank you!             Your Updated Medication List - Protect others around you: Learn how to safely use, store and throw away your medicines at www.disposemymeds.org.          This list is accurate as of  8/28/18  5:00 PM.  Always use your most recent med list.                   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, Cerebrovascular accident (CVA) due to other mechanism (H)       atorvastatin 40 MG tablet    LIPITOR    60 tablet    Take 1 tablet (40 mg) by mouth daily    Cerebrovascular accident (CVA) due to other mechanism (H)       azithromycin 250 MG tablet    ZITHROMAX    6 tablet    2 tabs day 1 and the 1 tab daily for 4 more days    Acute URI       bumetanide 1 MG tablet    BUMEX    90 tablet    1/2 tab daily or as directed    Chronic systolic congestive heart failure (H)       Carboxymethylcellulose Sod PF 0.5 % Soln ophthalmic solution    REFRESH PLUS    1 Bottle    Place 1 drop Into the left eye 3 times daily as needed (to flush debris from eye)    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       carvedilol 12.5 MG tablet    COREG    180 tablet    Take 0.5 tablets (6.25 mg) by mouth 2 times daily    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, Hypertension goal BP (blood pressure) < 140/90       famotidine 20 MG tablet    PEPCID    90 tablet    Take 1 tablet (20 mg) by mouth daily    Medication monitoring encounter       FLUoxetine 20 MG capsule    PROzac    90 capsule    Take 1 capsule (20 mg) by mouth daily    Major depressive disorder, single episode, moderate (H), Anxiety       iron 325 (65 Fe) MG tablet      Take 1 tablet by mouth every evening        losartan 50 MG tablet    COZAAR    90 tablet    TAKE 1/2  TABLET BY MOUTH EVERY 12 HOURS    Hypertension goal BP (blood pressure) < 140/90, Congestive heart failure, NYHA class 2, unspecified congestive heart failure type (H), Cardiomyopathy, unspecified type (H)       mirtazapine 30 MG tablet    REMERON    90 tablet    Take 1 tablet (30 mg) by mouth At Bedtime    Mild single current episode of major depressive disorder (H), Anxiety       multivitamin, therapeutic with minerals Tabs tablet     30 each    Take 1 tablet by mouth daily    Cerebrovascular accident (CVA) due to other mechanism (H)       OLANZapine 2.5 MG tablet    zyPREXA    90 tablet    TAKE 1 TABLET BY MOUTH EVERY DAY    Cerebrovascular accident (CVA) due to other mechanism (H), Anxiety, Delirium due to another medical condition       potassium chloride SA 20 MEQ CR tablet    KLOR-CON    30 tablet    Take 1 tablet (20 mEq) by mouth daily    Hypokalemia

## 2018-08-28 NOTE — LETTER
2018    Dipak Gordillo MD  [address] [facility] [address]    RE: Cricket Miguel       Dear Colleague,    I had the pleasure of seeing Cricket Miguel in the [facility].    HPI and Plan:   See [email or web address]    Orders Placed This Encounter   Procedures     Basic metabolic panel     Follow-Up with [facility]       Orders Placed This Encounter   Medications     potassium chloride SA (KLOR-CON) 20 MEQ CR tablet     Sig: Take 1 tablet (20 mEq) by mouth daily     Dispense:  30 tablet     Refill:  3     bumetanide (BUMEX) 1 MG tablet     Si/2 tab daily or as directed     Dispense:  90 tablet     Refill:  3       Medications Discontinued During This Encounter   Medication Reason     KLOR-CON 20 MEQ CR tablet Reorder     bumetanide (BUMEX) 1 MG tablet Alternate therapy         Encounter Diagnoses   Name Primary?     Chronic systolic congestive heart failure (H)      Hypokalemia      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      Hypertension goal BP (blood pressure) < 140/90        CURRENT MEDICATIONS:  Current Outpatient Prescriptions   Medication Sig Dispense Refill     albuterol (PROAIR HFA/PROVENTIL HFA/VENTOLIN HFA) 108 (90 Base) MCG/ACT Inhaler Inhale 2 puffs into the lungs every 6 hours as needed for shortness of breath / dyspnea or wheezing 1 Inhaler 0     aspirin 81 MG chewable tablet 1 tablet (81 mg) by Oral or Feeding Tube route daily 60 tablet 1     atorvastatin (LIPITOR) 40 MG tablet Take 1 tablet (40 mg) by mouth daily 60 tablet 1     bumetanide (BUMEX) 1 MG tablet 1/2 tab daily or as directed 90 tablet 3     Carboxymethylcellulose Sod PF (REFRESH PLUS) 0.5 % SOLN ophthalmic solution Place 1 drop Into the left eye 3 times daily as needed (to flush debris from eye) 1 Bottle 1     carvedilol (COREG) 12.5 MG tablet Take 0.5 tablets (6.25 mg) by mouth 2 times daily 180 tablet 1     famotidine (PEPCID) 20 MG tablet Take 1 tablet (20 mg)  by mouth daily 90 tablet 1     Ferrous Sulfate (IRON) 325 (65 FE) MG tablet Take 1 tablet by mouth every evening       FLUoxetine (PROZAC) 20 MG capsule Take 1 capsule (20 mg) by mouth daily 90 capsule 3     losartan (COZAAR) 50 MG tablet TAKE 1/2 TABLET BY MOUTH EVERY 12 HOURS 90 tablet 3     mirtazapine (REMERON) 30 MG tablet Take 1 tablet (30 mg) by mouth At Bedtime 90 tablet 3     multivitamin, therapeutic with minerals (THERA-VIT-M) TABS tablet Take 1 tablet by mouth daily 30 each 0     OLANZapine (ZYPREXA) 2.5 MG tablet TAKE 1 TABLET BY MOUTH EVERY DAY 90 tablet 1     potassium chloride SA (KLOR-CON) 20 MEQ CR tablet Take 1 tablet (20 mEq) by mouth daily 30 tablet 3     azithromycin (ZITHROMAX) 250 MG tablet 2 tabs day 1 and the 1 tab daily for 4 more days (Patient not taking: Reported on 8/28/2018) 6 tablet 0     [DISCONTINUED] bumetanide (BUMEX) 1 MG tablet TAKE 1 TABLET BY MOUTH DAILY 90 tablet 1       ALLERGIES     Allergies   Allergen Reactions     Lisinopril Swelling     One-sided facial swelling after being on lisinopril/HCTZ for one week.        PAST MEDICAL HISTORY:  Past Medical History:   Diagnosis Date     Abscess of aortic root 10/06/2017     AI (aortic insufficiency)     severe     Anxiety      Aortic root dilatation (H)      AR (aortic regurgitation)     severe     Cardiomyopathy (H)      CHF (congestive heart failure), NYHA class II (H)      Constipation     Dr Mcghee     COPD, mild (H)     spirometry 12/16     CVA (cerebral vascular accident) (H) 10/06/2017    septic emboli - MSSA - cerebellum, Left MCA, Rt Occipital, Aphasia, Left facial paralysis, Right>Left UE/LE weakness, Left visual field cut, moderate to severe encephalopathy, cognitive dysfunction, word finding     Depression 10/06/2017     Elevated PSA 05/2018    PSA = 8, Dr Garcia     Heart murmur      Hyperlipidemia LDL goal <70 10/31/2010     Hypertension goal BP (blood pressure) < 140/90      Inguinal hernia      left lung nodule   1/29/2008     Major depressive disorder, single episode, moderate (H)      Psoriasis childhood     SNHL (sensorineural hearing loss)     Left, secondary to CVA     Tobacco use disorder        PAST SURGICAL HISTORY:  Past Surgical History:   Procedure Laterality Date     ARTHROSCOPY KNEE INCISION AND DRAINAGE Left 10/8/2017    Arthroscopic Incision and Drainage of Left Knee - Dr Munoz     HC SHOULDER ARTHROSCOPY, DX  2001    Arthroscopy, Shoulder RT Dr OSIRIS Andersen     HC SHOULDER ARTHROSCOPY, DX  1/04    LT arthroscopy Dr OSIRIS Andersen     HERNIA REPAIR, UMBILICAL  1/08    incarcerated - dr rockwell     HERNIORRHAPHY INGUINAL  12/21/2012    HERNIORRHAPHY INGUINAL;  Right Inguinal Hernia Repair with mesh ;  Surgeon: Mello Rockwell MD;  Location: [facility] OR     REPAIR ANEURYSM ASCENDING AORTA N/A 12/19/2017    REPAIR ANEURYSM ASCENDING AORTA;  REDO Median STERNOTOMY, FEMORAL CANNULATION, Lysis of adhesions, AORTIC ROOT VALVE HOMOGRAFT with 26mm x 7.0cm [employer] Aortic valve and conduit - Dr Bowers     REPLACE VALVE AORTIC N/A 5/17/2016    REPLACE VALVE AORTIC - Dr Bowers     ROTATOR CUFF REPAIR RT/LT  11/10    Rt arthroscopy - Dr OSIRIS Andersen     SURGICAL HISTORY OF -   5/16    AVR, severe AR, aortic root repair     TRACHEOSTOMY PERCUTANEOUS  10/27/2017            FAMILY HISTORY:  Family History   Problem Relation Age of Onset     Genetic Disorder Mother      Bone plateover growth Agustin. shoulder surgeries     Cancer Mother      brain cancer     Alzheimer Disease Father        SOCIAL HISTORY:  Social History     Social History     Marital status:      Spouse name: N/A     Number of children: 3     Years of education: 12     Social History Main Topics     Smoking status: Former Smoker     Packs/day: 1.00     Years: 35.00     Quit date: 4/1/2016     Smokeless tobacco: Never Used      Comment: 4/2016     Alcohol use No     Drug use: Yes      Comment: marijuana- daily previously     Sexual activity: Yes     Partners: Female  "    Other Topics Concern     Caffeine Concern Yes     3 cups     Sleep Concern No     Special Diet No     trying to watch salt     Exercise Yes     walking     Seat Belt No     Parent/Sibling W/ Cabg, Mi Or Angioplasty Before 65f 55m? No     Social History Narrative       Review of Systems:  Skin:  Positive for hair changes     Eyes:  Positive for glasses    ENT:  Positive for hearing loss    Respiratory:  Positive for cough;mucoid expectorant     Cardiovascular:    Positive for;fatigue;exercise intolerance Rehab for stroke  Gastroenterology: Positive for nausea    Genitourinary:  Positive for urinary frequency    Musculoskeletal:  Negative      Neurologic:  Positive for paralysis;stroke right sided weakness, left side facial droop  Psychiatric:  Positive for excessive stress;anxiety;depression    Heme/Lymph/Imm:  Negative      Endocrine:  Negative        Physical Exam:  Vitals: /74 (BP Location: Left arm, Patient Position: Chair, Cuff Size: Adult Regular)  Pulse 62  Ht 1.702 m (5' 7\")  SpO2 93%    Constitutional:  cooperative        Skin:  warm and dry to the touch surgical scars well-healed        Head:  normocephalic        Eyes:           Lymph:      ENT:  no pallor or cyanosis        Neck:  JVP normal        Respiratory:  normal breath sounds, clear to auscultation, normal A-P diameter, normal symmetry, normal respiratory excursion, no use of accessory muscles         Cardiac: regular rhythm                pulses full and equal                                        GI:  abdomen soft        Extremities and Muscular Skeletal:                 Neurological:           Psych:  Alert and Oriented x 3        CC  No referring provider defined for this encounter.                Thank you for allowing me to participate in the care of your patient.      Sincerely,     BIBI Faust [facility]    cc:   No referring provider defined for this encounter.        "

## 2018-08-28 NOTE — TELEPHONE ENCOUNTER
"Requested Prescriptions   Pending Prescriptions Disp Refills     albuterol (PROAIR HFA/PROVENTIL HFA/VENTOLIN HFA) 108 (90 Base) MCG/ACT inhaler  Last Written Prescription Date:  8/4/2018  Last Fill Quantity: 1 Inhaler,  # refills: 0   Last office visit: 8/22/2018  Duy    Future Office Visit:   Next 5 appointments (look out 90 days)     Sep 04, 2018 12:20 PM CDT   SHORT with Dipak Gordillo MD   [facility] ([facility])    [address] [place] [id number]   [phone]                    1 Inhaler 0     Sig: Inhale 2 puffs into the lungs every 6 hours as needed for shortness of breath / dyspnea or wheezing    Asthma Maintenance Inhalers - Anticholinergics Passed    8/27/2018  4:15 PM    No flowsheet data found.-ACT3         Passed - Patient is age 12 years or older       Passed - Recent (12 mo) or future (30 days) visit within the authorizing provider's specialty    Patient had office visit in the last 12 months or has a visit in the next 30 days with authorizing provider or within the authorizing provider's specialty.  See \"Patient Info\" tab in inbasket, or \"Choose Columns\" in Meds & Orders section of the refill encounter.              "

## 2018-08-28 NOTE — LETTER
8/28/2018      Dipak Gordillo MD  [address] [facility] [address]      RE: Cricket Miguel       Dear Colleague,    I had the pleasure of seeing Cricket Miguel in the [facility].    Service Date: 08/28/2018      HISTORY OF PRESENT ILLNESS:  I had the pleasure of seeing Mr. 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  He had dental work done in the meantime which may have been the source of the infection.  His cardiomyopathy with decreased LV function improved.  His echocardiogram from 04/2018 showed an improving LVEF closer to 50%.      In May of this year, he presented to the emergency room.  He was diagnosed with C. difficile and discharged on antibiotics.      He has followed with Dr. Dipak Gordillo for his general care.  His weight has been stable at 178 pounds.      He is here today with his significant other, asking about weaning his diuretic and potassium.  He has no signs or symptoms of congestive heart failure.  No complaints of shortness of breath, orthopnea, PND, syncope or near-syncope.      PHYSICAL EXAMINATION:   VITAL SIGNS:  Blood pressure 100/74, pulse is 62, weight on 08/22/2018 at Dr. Gordillo's office was 178 pounds.  Please see complete physical examination below.      DIAGNOSTICS:   Basic metabolic panel:  Sodium 143, potassium 4.2, BUN 24, creatinine 1.12, GFR 66.      IMPRESSION AND PLAN:   1.  History of infective endocarditis, status post aortic valve and ascending aortic replacement, subsequent stroke and dehiscence requiring replacement.  From a cardiac standpoint, he has done well.  His cardiovascular medications have been appropriate.  Dr. Gordillo decreased his carvedilol back in April due to patient's complaint of lethargy.  He is currently on carvedilol 6.25 mg twice a day.  I have decreased  Bumex to half a tablet a day or 0.5 mg a day and decreased potassium to 20 mEq daily.  He will have a basic metabolic panel repeated in 1 month.  He will see Dr. Gordillo in 1 week's time, where he can weigh on the scale there to determine if there has been any change from  appointment.      I have asked the patient and his significant other to return in 1 month's time for a basic metabolic panel and a reassessment.      2.  Coronary artery disease, status post CABG at the time of his initial valve replacement.  Continue medical management.      3.  Stroke with left-sided facial droop and right-sided hemiparesis.  Continue with his current care.      4.  Cardiomyopathy, improving.  Follow up with Dr. Garzon with an echocardiogram done prior to the visit in 2019.         BIBI MARCIAL, CNP             D: 2018   T: 2018   MT: MERCEDEZ      Name:     ASHTYN STROUD   MRN:      [id number]        Account:      [id number]   :      1955           Service Date: 2018      Document: [id number]           Outpatient Encounter Prescriptions as of 2018   Medication Sig Dispense Refill     aspirin 81 MG chewable tablet 1 tablet (81 mg) by Oral or Feeding Tube route daily 60 tablet 1     atorvastatin (LIPITOR) 40 MG tablet Take 1 tablet (40 mg) by mouth daily 60 tablet 1     bumetanide (BUMEX) 1 MG tablet 1/2 tab daily or as directed 90 tablet 3     Carboxymethylcellulose Sod PF (REFRESH PLUS) 0.5 % SOLN ophthalmic solution Place 1 drop Into the left eye 3 times daily as needed (to flush debris from eye) 1 Bottle 1     carvedilol (COREG) 12.5 MG tablet Take 0.5 tablets (6.25 mg) by mouth 2 times daily 180 tablet 1     famotidine (PEPCID) 20 MG tablet Take 1 tablet (20 mg) by mouth daily 90 tablet 1     Ferrous Sulfate (IRON) 325 (65 FE) MG tablet Take 1 tablet by mouth every evening       FLUoxetine (PROZAC) 20 MG capsule Take 1 capsule (20 mg) by mouth daily 90 capsule 3     losartan  (COZAAR) 50 MG tablet TAKE 1/2 TABLET BY MOUTH EVERY 12 HOURS 90 tablet 3     mirtazapine (REMERON) 30 MG tablet Take 1 tablet (30 mg) by mouth At Bedtime 90 tablet 3     multivitamin, therapeutic with minerals (THERA-VIT-M) TABS tablet Take 1 tablet by mouth daily 30 each 0     OLANZapine (ZYPREXA) 2.5 MG tablet TAKE 1 TABLET BY MOUTH EVERY DAY 90 tablet 1     potassium chloride SA (KLOR-CON) 20 MEQ CR tablet Take 1 tablet (20 mEq) by mouth daily 30 tablet 3     [DISCONTINUED] albuterol (PROAIR HFA/PROVENTIL HFA/VENTOLIN HFA) 108 (90 Base) MCG/ACT Inhaler Inhale 2 puffs into the lungs every 6 hours as needed for shortness of breath / dyspnea or wheezing 1 Inhaler 0     azithromycin (ZITHROMAX) 250 MG tablet 2 tabs day 1 and the 1 tab daily for 4 more days (Patient not taking: Reported on 8/28/2018) 6 tablet 0     [DISCONTINUED] bumetanide (BUMEX) 1 MG tablet TAKE 1 TABLET BY MOUTH DAILY 90 tablet 1     [DISCONTINUED] KLOR-CON 20 MEQ CR tablet TAKE 1 TAB BY MOUTH TWICE DAILY 60 tablet 3     No facility-administered encounter medications on file as of 8/28/2018.        Again, thank you for allowing me to participate in the care of your patient.      Sincerely,    BIBI Faust [facility]

## 2018-08-28 NOTE — PROGRESS NOTES
HPI and Plan:   See [email or web address]    Orders Placed This Encounter   Procedures     Basic metabolic panel     Follow-Up with [facility]       Orders Placed This Encounter   Medications     potassium chloride SA (KLOR-CON) 20 MEQ CR tablet     Sig: Take 1 tablet (20 mEq) by mouth daily     Dispense:  30 tablet     Refill:  3     bumetanide (BUMEX) 1 MG tablet     Si/2 tab daily or as directed     Dispense:  90 tablet     Refill:  3       Medications Discontinued During This Encounter   Medication Reason     KLOR-CON 20 MEQ CR tablet Reorder     bumetanide (BUMEX) 1 MG tablet Alternate therapy         Encounter Diagnoses   Name Primary?     Chronic systolic congestive heart failure (H)      Hypokalemia      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      Hypertension goal BP (blood pressure) < 140/90        CURRENT MEDICATIONS:  Current Outpatient Prescriptions   Medication Sig Dispense Refill     albuterol (PROAIR HFA/PROVENTIL HFA/VENTOLIN HFA) 108 (90 Base) MCG/ACT Inhaler Inhale 2 puffs into the lungs every 6 hours as needed for shortness of breath / dyspnea or wheezing 1 Inhaler 0     aspirin 81 MG chewable tablet 1 tablet (81 mg) by Oral or Feeding Tube route daily 60 tablet 1     atorvastatin (LIPITOR) 40 MG tablet Take 1 tablet (40 mg) by mouth daily 60 tablet 1     bumetanide (BUMEX) 1 MG tablet 1/2 tab daily or as directed 90 tablet 3     Carboxymethylcellulose Sod PF (REFRESH PLUS) 0.5 % SOLN ophthalmic solution Place 1 drop Into the left eye 3 times daily as needed (to flush debris from eye) 1 Bottle 1     carvedilol (COREG) 12.5 MG tablet Take 0.5 tablets (6.25 mg) by mouth 2 times daily 180 tablet 1     famotidine (PEPCID) 20 MG tablet Take 1 tablet (20 mg) by mouth daily 90 tablet 1     Ferrous Sulfate (IRON) 325 (65 FE) MG tablet Take 1 tablet by mouth every evening       FLUoxetine (PROZAC) 20 MG capsule Take 1 capsule (20 mg) by mouth daily 90 capsule 3     losartan  (COZAAR) 50 MG tablet TAKE 1/2 TABLET BY MOUTH EVERY 12 HOURS 90 tablet 3     mirtazapine (REMERON) 30 MG tablet Take 1 tablet (30 mg) by mouth At Bedtime 90 tablet 3     multivitamin, therapeutic with minerals (THERA-VIT-M) TABS tablet Take 1 tablet by mouth daily 30 each 0     OLANZapine (ZYPREXA) 2.5 MG tablet TAKE 1 TABLET BY MOUTH EVERY DAY 90 tablet 1     potassium chloride SA (KLOR-CON) 20 MEQ CR tablet Take 1 tablet (20 mEq) by mouth daily 30 tablet 3     azithromycin (ZITHROMAX) 250 MG tablet 2 tabs day 1 and the 1 tab daily for 4 more days (Patient not taking: Reported on 8/28/2018) 6 tablet 0     [DISCONTINUED] bumetanide (BUMEX) 1 MG tablet TAKE 1 TABLET BY MOUTH DAILY 90 tablet 1       ALLERGIES     Allergies   Allergen Reactions     Lisinopril Swelling     One-sided facial swelling after being on lisinopril/HCTZ for one week.        PAST MEDICAL HISTORY:  Past Medical History:   Diagnosis Date     Abscess of aortic root 10/06/2017     AI (aortic insufficiency)     severe     Anxiety      Aortic root dilatation (H)      AR (aortic regurgitation)     severe     Cardiomyopathy (H)      CHF (congestive heart failure), NYHA class II (H)      Constipation     Dr Mcghee     COPD, mild (H)     spirometry 12/16     CVA (cerebral vascular accident) (H) 10/06/2017    septic emboli - MSSA - cerebellum, Left MCA, Rt Occipital, Aphasia, Left facial paralysis, Right>Left UE/LE weakness, Left visual field cut, moderate to severe encephalopathy, cognitive dysfunction, word finding     Depression 10/06/2017     Elevated PSA 05/2018    PSA = 8, Dr Garcia     Heart murmur      Hyperlipidemia LDL goal <70 10/31/2010     Hypertension goal BP (blood pressure) < 140/90      Inguinal hernia      left lung nodule  1/29/2008     Major depressive disorder, single episode, moderate (H)      Psoriasis childhood     SNHL (sensorineural hearing loss)     Left, secondary to CVA     Tobacco use disorder        PAST SURGICAL  HISTORY:  Past Surgical History:   Procedure Laterality Date     ARTHROSCOPY KNEE INCISION AND DRAINAGE Left 10/8/2017    Arthroscopic Incision and Drainage of Left Knee - Dr Munoz     HC SHOULDER ARTHROSCOPY, DX  2001    Arthroscopy, Shoulder RT Dr OSIRIS Andersen     HC SHOULDER ARTHROSCOPY, DX  1/04    LT arthroscopy Dr OSIRIS Andersen     HERNIA REPAIR, UMBILICAL  1/08    incarcerated - dr rockwell     HERNIORRHAPHY INGUINAL  12/21/2012    HERNIORRHAPHY INGUINAL;  Right Inguinal Hernia Repair with mesh ;  Surgeon: Mello Rockwell MD;  Location: [facility] OR     REPAIR ANEURYSM ASCENDING AORTA N/A 12/19/2017    REPAIR ANEURYSM ASCENDING AORTA;  REDO Median STERNOTOMY, FEMORAL CANNULATION, Lysis of adhesions, AORTIC ROOT VALVE HOMOGRAFT with 26mm x 7.0cm Cryolife Aortic valve and conduit - Dr Bowers     REPLACE VALVE AORTIC N/A 5/17/2016    REPLACE VALVE AORTIC - Dr Bowers     ROTATOR CUFF REPAIR RT/LT  11/10    Rt arthroscopy - Dr OSIRIS Andersen     SURGICAL HISTORY OF -   5/16    AVR, severe AR, aortic root repair     TRACHEOSTOMY PERCUTANEOUS  10/27/2017            FAMILY HISTORY:  Family History   Problem Relation Age of Onset     Genetic Disorder Mother      Bone plateover growth Agustin. shoulder surgeries     Cancer Mother      brain cancer     Alzheimer Disease Father        SOCIAL HISTORY:  Social History     Social History     Marital status:      Spouse name: N/A     Number of children: 3     Years of education: 12     Social History Main Topics     Smoking status: Former Smoker     Packs/day: 1.00     Years: 35.00     Quit date: 4/1/2016     Smokeless tobacco: Never Used      Comment: 4/2016     Alcohol use No     Drug use: Yes      Comment: marijuana- daily previously     Sexual activity: Yes     Partners: Female     Other Topics Concern     Caffeine Concern Yes     3 cups     Sleep Concern No     Special Diet No     trying to watch salt     Exercise Yes     walking     Seat Belt No     Parent/Sibling W/ Cabg, Mi  "Or Angioplasty Before 65f 55m? No     Social History Narrative       Review of Systems:  Skin:  Positive for hair changes     Eyes:  Positive for glasses    ENT:  Positive for hearing loss    Respiratory:  Positive for cough;mucoid expectorant     Cardiovascular:    Positive for;fatigue;exercise intolerance Rehab for stroke  Gastroenterology: Positive for nausea    Genitourinary:  Positive for urinary frequency    Musculoskeletal:  Negative      Neurologic:  Positive for paralysis;stroke right sided weakness, left side facial droop  Psychiatric:  Positive for excessive stress;anxiety;depression    Heme/Lymph/Imm:  Negative      Endocrine:  Negative        Physical Exam:  Vitals: /74 (BP Location: Left arm, Patient Position: Chair, Cuff Size: Adult Regular)  Pulse 62  Ht 1.702 m (5' 7\")  SpO2 93%    Constitutional:  cooperative        Skin:  warm and dry to the touch surgical scars well-healed        Head:  normocephalic        Eyes:           Lymph:      ENT:  no pallor or cyanosis        Neck:  JVP normal        Respiratory:  normal breath sounds, clear to auscultation, normal A-P diameter, normal symmetry, normal respiratory excursion, no use of accessory muscles         Cardiac: regular rhythm                pulses full and equal                                        GI:  abdomen soft        Extremities and Muscular Skeletal:                 Neurological:           Psych:  Alert and Oriented x 3        CC  No referring provider defined for this encounter.              "

## 2018-08-28 NOTE — PATIENT INSTRUCTIONS
Call the [facility] nurse for any questions or concerns:  [phone]    1.  Medication changes from today: decrease bumex to 1/2 tab daily, and decrease potassium to 1 tab daily.     2. Follow up plan: in one rio. Try musinex     3.  Weigh yourself daily and write it down.    4.  Call [facility] nurse if your weight is up more than 2 pounds in one day or 5 pounds in one week.    5.  Call [facility] nurse if you feel more short of breath, have more abdominal bloating, or leg swelling.    6.  Continue low sodium diet (less than 2000 mg daily). If you eat less salt, you will retain less fluid.    7.  Alcohol can weaken your heart further. You should avoid alcohol or limit its use to special times, such as a holiday or birthday.     8.  Do NOT take Aleve (naproxen) or Advil (ibuprofen) without talking to your doctor first.     9.  Lab Results:    Component      Latest Ref Rng & Units 8/24/2018   Sodium      133 - 144 mmol/L 143   Potassium      3.4 - 5.3 mmol/L 4.2   Chloride      94 - 109 mmol/L 107   Carbon Dioxide      20 - 32 mmol/L 28   Anion Gap      3 - 14 mmol/L 8   Glucose      70 - 99 mg/dL 122 (H)   Urea Nitrogen      7 - 30 mg/dL 24   Creatinine      0.66 - 1.25 mg/dL 1.12   GFR Estimate      >60 mL/min/1.7m2 66   GFR Estimate If Black      >60 mL/min/1.7m2 80   Calcium      8.5 - 10.1 mg/dL 9.3   Bilirubin Total      0.2 - 1.3 mg/dL 0.4   Albumin      3.4 - 5.0 g/dL 3.5   Protein Total      6.8 - 8.8 g/dL 7.6   Alkaline Phosphatase      40 - 150 U/L 126   ALT      0 - 70 U/L 17   AST      0 - 45 U/L 14   N-Terminal Pro Bnp      0 - 125 pg/mL 294 (H)     [facility]: Cardiomyopathy, Optimization, Rehabilitation, Education  [facility] is a heart failure specialty clinic within the [facility] where you will work with your cardiologist, nurse practitioners, physician assistants and registered nurses who specialize in heart failure care. They are dedicated to helping patients with  heart failure to carefully adjust medications, receive education, and learn who and when to call if symptoms develop. They specialize in helping you better understand your condition, slow the progression of your disease, improve the length and quality of your life, help you detect future heart problems before they become life threatening, and avoid hospitalizations.

## 2018-08-29 NOTE — PROGRESS NOTES
Service Date: 08/28/2018      HISTORY OF PRESENT ILLNESS:  I had the pleasure of seeing Mr. 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  He had dental work done in the meantime which may have been the source of the infection.  His cardiomyopathy with decreased LV function improved.  His echocardiogram from 04/2018 showed an improving LVEF closer to 50%.      In May of this year, he presented to the emergency room.  He was diagnosed with C. difficile and discharged on antibiotics.      He has followed with Dr. Dipak Gordillo for his general care.  His weight has been stable at 178 pounds.      He is here today with his significant other, asking about weaning his diuretic and potassium.  He has no signs or symptoms of congestive heart failure.  No complaints of shortness of breath, orthopnea, PND, syncope or near-syncope.      PHYSICAL EXAMINATION:   VITAL SIGNS:  Blood pressure 100/74, pulse is 62, weight on 08/22/2018 at Dr. Gordillo's office was 178 pounds.  Please see complete physical examination below.      DIAGNOSTICS:   Basic metabolic panel:  Sodium 143, potassium 4.2, BUN 24, creatinine 1.12, GFR 66.      IMPRESSION AND PLAN:   1.  History of infective endocarditis, status post aortic valve and ascending aortic replacement, subsequent stroke and dehiscence requiring replacement.  From a cardiac standpoint, he has done well.  His cardiovascular medications have been appropriate.  Dr. Gordillo decreased his carvedilol back in April due to patient's complaint of lethargy.  He is currently on carvedilol 6.25 mg twice a day.  I have decreased Bumex to half a tablet a day or 0.5 mg a day and decreased potassium to 20 mEq daily.  He will have a basic metabolic panel repeated in 1 month.  He will see Dr. Gordillo in 1 week's time, where he can weigh on the scale there to  determine if there has been any change from  appointment.      I have asked the patient and his significant other to return in 1 month's time for a basic metabolic panel and a reassessment.      2.  Coronary artery disease, status post CABG at the time of his initial valve replacement.  Continue medical management.      3.  Stroke with left-sided facial droop and right-sided hemiparesis.  Continue with his current care.      4.  Cardiomyopathy, improving.  Follow up with Dr. Garzon with an echocardiogram done prior to the visit in 2019.         BIBI MARCIAL, CNP             D: 2018   T: 2018   MT: MERCEDEZ      Name:     ASHTYN STROUD   MRN:      [id number]        Account:      [id number]   :      1955           Service Date: 2018      Document: [id number]

## 2018-08-29 NOTE — PROGRESS NOTES
SUBJECTIVE:   Cricket Miguel is a 63 year old male who presents to clinic today for the following health issues:    8/23    Need letter that states that the patient was competent when signing power of  paperwork faxed to [phone]    Follow up on cough - was better while on antibiotic but now bad again - lack of stamina    Seeing stroke specialist tomorrow    CHF/Cardiomyopathy/Hypertension/Hyperlipidemia/AVR/Hx endocarditis:  Managed by atorvastatin 40 mg, Bumex 1 mg, carvedilol 12.5 mg, and losartan 50 mg.    BP Readings from Last 3 Encounters:   08/28/18 100/74   08/22/18 118/74   08/04/18 91/63     Creatinine   Date Value Ref Range Status   08/24/2018 1.12 0.66 - 1.25 mg/dL Final     Recent Labs   Lab Test  04/25/18   0941  10/13/17   0357 04/03/16 02/07/12   1214   CHOL  107   --   105*  198   HDL  39*   --   25  47   LDL  41   --   56  123   TRIG  133  265*  120  138   CHOLHDLRATIO   --    --    --   4.2     Wt Readings from Last 4 Encounters:   08/22/18 178 lb 4.8 oz (80.9 kg)   05/25/18 178 lb (80.7 kg)   05/15/18 182 lb (82.6 kg)   03/13/18 182 lb (82.6 kg)     Cough/COPD: Pt's cough was improved while taking antibiotics, but has since worsened. Pt's cough is persistent and productive. SO states the production is very wet and sticky but she can't see what color it is because the patient just swallows it again. Pt has been wheezing frequently but intermittently and persistently Pt denies fever, chills, sweats, rhinorrhea, sneezing.     CVA: Improved. Patient reports having right sided weakness and right sided numbness. Patient can't feel much of anything on the right side of his body. He has an appointment with a stroke specialist tomorrow, 08/23/2018.     Depression/Anxiety: Stable. SO states that he is about the same. Managed by fluoxetine 20 mg and olanzapine 2.5 mg.     Stamina:  Pt doesn't have much stamina, he is sleeping quite a lot and will fall asleep quite often during the day in  the car or at home sitting down.     Sleep: SO states pt is sleeping well at night, about 14 hours.     Bladder: Pt is having a hard time both starting and stopping urination.     Bowel Movements: Patient uses 1 cap full of miralax daily, hasn't been successful.     5/15    ED/UC Followup:     Facility:  [facility]  Date of visit: 5/7/18  Reason for visit: C diff  Current Status: still having abdominal cramps - stools getting harder      He is taking currently taking Vancomycin. Patient's bowels have been getting more firm. The patient feels like he is constipated and notes he has not gone to the bathroom recently. The SO notes according to his CT, his intestines are full. Patient notes he wishes to go to the bathroom more frequently. The wife reports patient has trouble knowing when he has to go to the bathroom.     Patient is currently seeing PT and OT twice a week. He does this on Monday's and Wednesdays. Patient is also seeing a speech therapist in [place]. Patient has been able to walk with a harness.      Stroke/endocarditis: Patient has right sided deficits for his stroke.     Teeth: Patient is currently looking into getting dental implants/dentures.      Elevated PSA: Patient had a PSA of 8.08ug/L. SO notes she is unsure if they have Urology follow up scheduled.    Cardiology, endocarditis, s/p AVR, abscess repair: Patient has a follow up scheduled for his heart issues, Dr Garzon.     COPD: Stable     Anxiety/Depression: PHQ: 8, HILARY 5 - notes stable/improved      Htn/Lipids:    3/13/18     Concern - fever/ mouth sores  Onset: on and off x 6 days    Description:   mouth    Intensity: mild to moderate    Progression of Symptoms:  worsening    Accompanying Signs & Symptoms:  Fever on and off- swelling on left side of face    Previous history of similar problem:        Precipitating factors:   Worsened by: none    Alleviating factors:  Improved by: pt was given an abx which helped     No f/c/s - no sinus, no uri  sx.     Therapies Tried and outcome: lnone     Extensive recent medical/surgical history since 10/06/17, endocarditis with CVA and cardiac surgery.  Reviewed current cares and rehab.     2/19 Note --   Recent Hospitalization -- Reviewed documentation per Cardiology (Elissa Garzon) regarding recent Endocarditis and Redo sternotomy, femoral arterial and venous cannulation for cardiopulmonary bypass, aortic root and aortic valve replacement procedures at [facility]. After surgery, he was discharged to [facility].       At time of discharge from [facility] (Chart review 2/14), Cricket had finished his antibiotics and endocarditis was resolved. His GT was removed 1/19.       He stated that his right arm and leg strength and function are improving, and he is able to stand on his own at this point - undergoing PT.       Cricket stated that he doesn't taste much food yet, and his dentures aren't fitting properly, swallowing ok.       Cricket's caretaker expressed concerns about lethargy - his hgb was a bit low upon last CBC.       He is planning to follow up with ENT next week to help with congestion sx.       He has aides in place at home to help him, but things are still difficult -- looking for other options for a place to live, house has multiple levels. He is also wanting to be able to use his motor home again - would need to modify.       Caretaker expressed concerns about Cricket not taking potassium as directed -- he has been taking Bumex.                 Potassium   Date Value Ref Range Status   02/19/2018 4.1 3.4 - 5.3 mmol/L Final       TCU Note 1/8/18 --   Hospital Course: Cricket Miguel is a 62 year old male with hx of COPD, HTN, HLD, CAD, heart failure, aortic stenosis and ascending aortic aneurysm s/p aortic valve replacement and aortic root replacement (April 2016) c/b moises-aortic abscess, endocarditis, cerebral septic emboli and severe AI s/p redo-sternotomy aortic root and valve homograft  12/19. Post op EF 20-25%.        PLAN: Neuro/ pain/ sedation: Hx of multifocal acute infarctions involving the left parietotemporal lobes, left cerebral hemisphere and right occipital lobe presumed to be septic emboli. History of depression and anxiety. Had some delirium post operatively. Patient was followed by both psychiatry and neurology post operatively. His delirium has resolved and is mental status has significantly improved. He is alert and oriented x 3. His daughter Eloy is his medical decision maker as psychiatry does not believe he has the capacity at this time to be his own decision maker. His partner Meghna is very involed in his care and is his caregiver at home. He has been stable on his current regimen of zyprexa 2.5 mg bid, it was decreased 1/1/18 given increased flat affect and delayed responses. This has since resolved and he is doing better with both anxiety and affect. He is also on mirtazapine at night for sleep and PRN Lorazepam for anxiety. Neurology recommended fluoxetine for post-stroke recovery, however, Psych wanted to hold off on any changes to psych medications just prior to discharge. They also recommend against fluoxetine or Effexor at this time due to patient's long Qtc interval, 479 ms on 12/27. Patient did take Effexor prior to surgery for his depression. Should his depression become an issue psychiatry recommends starting Lexapro, as it has very few drug-drug interactions. Per neurology note on 12/29/17, they believe he should have continued functional improvement to some degree.       ED Note 12/16/17 --   Medical Decision Making: Cricket Miguel is a 62 year old male with a PMH of aortic stenosis with ascending aortic aneurysm/CAD s/p aortic valve replacement and aortic root replacement with composite graft (April 2016), HFpEF, COPD, HTN, HLD who presents for evaluation of shortness of breath.  I considered a broad differential of their dyspnea including CHF exacerbation, PE,  "dissection, hemothorax, pleural effusion, pneumonia, acute coronary syndrome, reactive airway disease, bronchitis, upper airway obstruction, foreign body, etc.  Given the history and exam plus laboratory findings I feel congestive heart failure is the most likely etiology and would not do extensive workup for other causes as mentioned above at this time.  Troponin was elevated as discussed above; again, cardiology feels this is likely to CHF and did not have any specific recommendations for this at time, and patient is a symptomatic without any chest pain, dyspnea, dizziness.  The patient received IV diuretics in ED and remained stable; will start I/O's here in ED.  Will transfer to the [facility] at this time for further cares.  Patient and his family are comfortable with plan.      12/15/17 OV Note --   Hx of CVA/Endocarditis -- Occurred 10/6/17 after abscess to heart, hospitalized ([facility]), transitional care followed on 11/4/17, sent home on 12/1/17 with reports of \"good recovery\" per hospital -- now undergoing PT/speech therapy and home care to help with speech, etc - this is going well. Discussed plans for surgery ([facility] at [facility]) in 1/18, with IV abx until that time. Followed by cardiology (Ryann).      See extensive DC summary from [place] - reviewed in total with patient and significant other - Meghna      He has been able to swallow/chew with minimal issues, but \"nothing tastes good\" - will be seen by Dentistry for assistance with dentures. Feeding tube in place for medications. BM's have not been good - liquid and irregular - Miralax, lactobacillus, imodium A-D.       Leg strength improving bilaterally, left arm strength is \"okay\" but he continues to have issues with right arm strength and use - overall left side better than left.       Eye sx (issues closing eye) were assessed by Opthalmology and is being treated with drops, successfully - will follow up after surgeries.       Reviewed documentation " "- followed by Infections Disease (Saleem at [place], [facility] at [facility]).       COPD -- Giovanni complained of dyspnea issues when he lays down to bed - sleeping on an incline. Nebulizer available which helps - tracheotomy tube sometime around 11/25 while in hospital.       HTN -- Labetalol 100 mg every 8 hours, Amlodipine 5 mg daily, HCTZ 12.5 mg daily. Monitored by Homecare Nurse - good numbers.               BP Readings from Last 3 Encounters:   12/27/17 111/90   12/16/17 128/79   12/15/17 (!) 126/94       Anxiety/Depression/Agitation/Sleep -- Abilify 6 mg daily, Seroquel 25 mg 4 times daily via oral or feeding tube, Remeron 22.5 mg daily, Effexor 100 mg twice daily. Giovanni reported that he sleeps \"well\", but his SO stated that he often wakes up and is restless. Giovanni reported that he often gets frustrated.       Pain -- Roxicodone 5-10 mg prn every 4 hours, Tylenol 650 mg via feeding tube every 6 hours.        Lipids -- Lipitor 40 mg daily, Aspirin 81 mg daily.    Problem list and histories reviewed & adjusted, as indicated.  Additional history: as documented    Labs reviewed in EPIC    Reviewed and updated as needed this visit by clinical staff  Tobacco  Allergies  Meds  Med Hx  Surg Hx  Fam Hx  Soc Hx      Reviewed and updated as needed this visit by Provider       Wt Readings from Last 4 Encounters:   08/22/18 178 lb 4.8 oz (80.9 kg)   05/25/18 178 lb (80.7 kg)   05/15/18 182 lb (82.6 kg)   03/13/18 182 lb (82.6 kg)       Health Maintenance    Health Maintenance Due   Topic Date Due     HF ACTION PLAN Q3 YR  1955     WELLNESS VISIT Q1 YR  1955     COPD ACTION PLAN Q1 YR  1955     FIT Q1 YR  03/16/1963     MICROALBUMIN Q1 YEAR  02/07/2013       Current Problem List    Patient Active Problem List   Diagnosis     Tobacco use disorder     Hypertension goal BP (blood pressure) < 140/90     Disease of lung     Major depressive disorder, single episode, moderate (HCC)     Advance Care Planning     " Psoriasis     COPD, mild (H)     CHF (congestive heart failure), NYHA class II (H)     AR (aortic regurgitation)     AI (aortic insufficiency)     Aortic root dilatation (H)     Hyperlipidemia LDL goal <70     Health Care Home     Status post coronary angiogram     Cardiomyopathy (H)     S/P AVR (aortic valve replacement)     H/O aortic root repair     Anemia     Endocarditis     Encephalopathy     Abscess of aortic root     CVA (cerebral vascular accident) (H)     Anxiety     Chronic systolic heart failure (H)     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     SNHL (sensorineural hearing loss)     Hx of aortic root repair     Prophylactic antibiotic     Elevated PSA     Constipation       Past Medical History    Past Medical History:   Diagnosis Date     Abscess of aortic root 10/06/2017     AI (aortic insufficiency)     severe     Anxiety      Aortic root dilatation (H)      AR (aortic regurgitation)     severe     Cardiomyopathy (H)      CHF (congestive heart failure), NYHA class II (H)      Constipation     Dr Mcghee     COPD, mild (H)     spirometry 12/16     CVA (cerebral vascular accident) (H) 10/06/2017    septic emboli - MSSA - cerebellum, Left MCA, Rt Occipital, Aphasia, Left facial paralysis, Right>Left UE/LE weakness, Left visual field cut, moderate to severe encephalopathy, cognitive dysfunction, word finding     Depression 10/06/2017     Elevated PSA 05/2018    PSA = 8, Dr Garcia     Heart murmur      Hyperlipidemia LDL goal <70 10/31/2010     Hypertension goal BP (blood pressure) < 140/90      Inguinal hernia      left lung nodule  1/29/2008     Major depressive disorder, single episode, moderate (H)      Psoriasis childhood     SNHL (sensorineural hearing loss)     Left, secondary to CVA     Tobacco use disorder        Past Surgical History    Past Surgical History:   Procedure Laterality Date     ARTHROSCOPY KNEE INCISION AND DRAINAGE Left 10/8/2017    Arthroscopic  Incision and Drainage of Left Knee - Dr Munoz     HC SHOULDER ARTHROSCOPY, DX  2001    Arthroscopy, Shoulder RT Dr OSIRIS Andersen     HC SHOULDER ARTHROSCOPY, DX  1/04    LT arthroscopy Dr OSIRIS Andersen     HERNIA REPAIR, UMBILICAL  1/08    incarcerated - dr rockwell     HERNIORRHAPHY INGUINAL  12/21/2012    HERNIORRHAPHY INGUINAL;  Right Inguinal Hernia Repair with mesh ;  Surgeon: Mello Rockwell MD;  Location: [facility] OR     REPAIR ANEURYSM ASCENDING AORTA N/A 12/19/2017    REPAIR ANEURYSM ASCENDING AORTA;  REDO Median STERNOTOMY, FEMORAL CANNULATION, Lysis of adhesions, AORTIC ROOT VALVE HOMOGRAFT with 26mm x 7.0cm Cryolife Aortic valve and conduit - Dr Bowers     REPLACE VALVE AORTIC N/A 5/17/2016    REPLACE VALVE AORTIC - Dr Bowers     ROTATOR CUFF REPAIR RT/LT  11/10    Rt arthroscopy - Dr OSIRIS Andersen     SURGICAL HISTORY OF -   5/16    AVR, severe AR, aortic root repair     TRACHEOSTOMY PERCUTANEOUS  10/27/2017            Current Medications    Current Outpatient Prescriptions   Medication Sig Dispense Refill     albuterol (PROAIR HFA/PROVENTIL HFA/VENTOLIN HFA) 108 (90 Base) MCG/ACT Inhaler Inhale 2 puffs into the lungs every 6 hours as needed for shortness of breath / dyspnea or wheezing 1 Inhaler 0     aspirin 81 MG chewable tablet 1 tablet (81 mg) by Oral or Feeding Tube route daily 60 tablet 1     atorvastatin (LIPITOR) 40 MG tablet Take 1 tablet (40 mg) by mouth daily 60 tablet 1     azithromycin (ZITHROMAX) 250 MG tablet 2 tabs day 1 and the 1 tab daily for 4 more days (Patient not taking: Reported on 8/28/2018) 6 tablet 0     Carboxymethylcellulose Sod PF (REFRESH PLUS) 0.5 % SOLN ophthalmic solution Place 1 drop Into the left eye 3 times daily as needed (to flush debris from eye) 1 Bottle 1     carvedilol (COREG) 12.5 MG tablet Take 0.5 tablets (6.25 mg) by mouth 2 times daily 180 tablet 1     famotidine (PEPCID) 20 MG tablet Take 1 tablet (20 mg) by mouth daily 90 tablet 1     Ferrous Sulfate (IRON) 325 (65  FE) MG tablet Take 1 tablet by mouth every evening       FLUoxetine (PROZAC) 20 MG capsule Take 1 capsule (20 mg) by mouth daily 90 capsule 3     losartan (COZAAR) 50 MG tablet TAKE 1/2 TABLET BY MOUTH EVERY 12 HOURS 90 tablet 3     mirtazapine (REMERON) 30 MG tablet Take 1 tablet (30 mg) by mouth At Bedtime 90 tablet 3     multivitamin, therapeutic with minerals (THERA-VIT-M) TABS tablet Take 1 tablet by mouth daily 30 each 0     OLANZapine (ZYPREXA) 2.5 MG tablet TAKE 1 TABLET BY MOUTH EVERY DAY 90 tablet 1     [DISCONTINUED] bumetanide (BUMEX) 1 MG tablet TAKE 1 TABLET BY MOUTH DAILY 90 tablet 1     bumetanide (BUMEX) 1 MG tablet 1/2 tab daily or as directed 90 tablet 3     potassium chloride SA (KLOR-CON) 20 MEQ CR tablet Take 1 tablet (20 mEq) by mouth daily 30 tablet 3       Allergies    Allergies   Allergen Reactions     Lisinopril Swelling     One-sided facial swelling after being on lisinopril/HCTZ for one week.        Immunizations    Immunization History   Administered Date(s) Administered     Influenza Vaccine IM 3yrs+ 4 Valent IIV4 10/28/2017     Pneumococcal 23 valent 11/04/2017     TD (ADULT, 7+) 04/14/1999       Family History    Family History   Problem Relation Age of Onset     Genetic Disorder Mother      Bone plateover growth Agustin. shoulder surgeries     Cancer Mother      brain cancer     Alzheimer Disease Father        Social History    Social History     Social History     Marital status:      Spouse name: N/A     Number of children: 3     Years of education: 12     Occupational History     Not on file.     Social History Main Topics     Smoking status: Former Smoker     Packs/day: 1.00     Years: 35.00     Quit date: 4/1/2016     Smokeless tobacco: Never Used      Comment: 4/2016     Alcohol use No     Drug use: Yes      Comment: marijuana- daily previously     Sexual activity: Yes     Partners: Female     Other Topics Concern     Caffeine Concern Yes     3 cups     Sleep Concern No      "Special Diet No     trying to watch salt     Exercise Yes     walking     Seat Belt No     Parent/Sibling W/ Cabg, Mi Or Angioplasty Before 65f 55m? No     Social History Narrative       All above reviewed and updated, all stable unless otherwise noted    Recent labs reviewed    ROS:  Constitutional, HEENT, cardiovascular, pulmonary, GI, , musculoskeletal, neuro, skin, endocrine and psych systems are negative, except as in HPI or otherwise noted     This document serves as a record of the services and decisions personally performed and made by Dipak Gordillo MD [facility]. It was created on their behalf by Walter Gordilol, a trained medical scribe. The creation of this document is based the provider's statements to the medical scribe.  Walter Gordillo August 22, 2018 5:23 PM    OBJECTIVE:                                                    /74  Pulse 62  Temp 97.2  F (36.2  C) (Oral)  Ht 5' 7\" (1.702 m)  Wt 178 lb 4.8 oz (80.9 kg)  SpO2 94%  BMI 27.93 kg/m2  Body mass index is 27.93 kg/(m^2).  GENERAL: healthy, alert and no distress  EYES: Eyes grossly normal to inspection, extraocular movements - intact, and PERRL  HENT: ear canals- normal; TMs- normal upon viewing with otoscope; Nose- normal; Mouth- no ulcers, no lesions upon viewing with otoscope  NECK: no tenderness, no adenopathy, no asymmetry, no masses, no stiffness; thyroid- normal to palpation  RESP: lungs clear to auscultation - no rales, no rhonchi, no wheezes  CV: regular rates and rhythm, normal S1 S2, no S3 or S4 and no murmur, no click or rub -  ABDOMEN: soft, no tenderness, no  hepatosplenomegaly, no masses, normal bowel sounds  MS: extremities- no gross deformities noted, no edema  SKIN: no suspicious lesions, no rashes to visible skin  NEURO: strength and tone- normal, sensory exam- grossly normal, mentation- intact, speech- normal, reflexes- symmetric  BACK: no CVA tenderness, no paralumbar tenderness  PSYCH: Alert and oriented times 3; speech- " coherent , normal rate and volume; able to articulate logical thoughts, able to abstract reason, no tangential thoughts, no hallucinations or delusions, affect- normal  LYMPHATICS: ant. cervical- normal, post. cervical- normal, axillary- normal, supraclavicular- normal, inguinal- normal    DIAGNOSTICS/PROCEDURES:                                                      none - future fasting labs     ASSESSMENT/PLAN:                                                        ICD-10-CM    1. Cerebrovascular accident (CVA), unspecified mechanism (H) I63.9    2. 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 I39    3. S/P AVR (aortic valve replacement) Z95.2    4. H/O aortic root repair Z98.890    5. Chronic systolic heart failure (H) I50.22    6. Cardiomyopathy, unspecified type (H) I42.9    7. COPD, mild (H) J44.9    8. Hypertension goal BP (blood pressure) < 140/90 I10    9. Hyperlipidemia LDL goal <70 E78.5    10. Major depressive disorder, single episode, moderate (HCC) F32.1    11. Acute URI J06.9 azithromycin (ZITHROMAX) 250 MG tablet   12. Medication monitoring encounter Z51.81      Discussed treatment/modality options, including risk and benefits, he desires Patient advised to increase Miralax dosage from 1 cap full to 1.5 cap full. Patient advised to ask stroke specialist if there are any other medications to try, or modifications to make to medications. Patient also advised to ask about other facilities or way to increase physical capabilities. Advised to ask about any potential aspiration and if the stroke specialist would like to examine anything in regards to that. Patient advised to use albuterol every 4 hours as needed to help with wheezing.. All diagnosis above reviewed and noted above, otherwise stable.  See [facility] orders for further details.      Stroke consult.  Continue PT/OT/ST  zithromax  Fasting labs soon  Close follow up    Return in about 2 weeks (around 9/5/2018), or if  symptoms worsen or fail to improve, for Medication Recheck Visit.    Health Maintenance Due   Topic Date Due     HF ACTION PLAN Q3 YR  1955     WELLNESS VISIT Q1 YR  1955     COPD ACTION PLAN Q1 YR 1955     FIT Q1 YR 03/16/1963     MICROALBUMIN Q1 YEAR  02/07/2013       See Patient Instructions    The information in this document, created by the medical scribe for me, accurately reflects the services I personally performed and the decisions made by me. I have reviewed and approved this document for accuracy prior to leaving the patient care area.  5:23 PM, 08/22/18           Dipak Gordillo MD [address]  [address] [phone] Fax

## 2018-08-29 NOTE — TELEPHONE ENCOUNTER
Prescription approved per [facility], [facility] or [facility] refill protocol.  Celena Olguin RN - Triage  [facility]

## 2018-09-01 NOTE — TELEPHONE ENCOUNTER
"Requested Prescriptions   Pending Prescriptions Disp Refills     bumetanide (BUMEX) 1 MG tablet [Pharmacy Med Name: BUMETANIDE 1 MG TABLET]  Last Written Prescription Date:  8-  Last Fill Quantity: 90 tablet,  # refills: 3   Last office visit: 8/22/2018 with prescribing provider:     Future Office Visit:   Next 5 appointments (look out 90 days)     Sep 04, 2018 12:20 PM CDT   SHORT with Dipak Gordillo MD   [facility] ([facility])    [address] [place] [id number] [phone]                  90 tablet 0     Sig: TAKE 1 TAB BY MOUTH DAILY    Diuretics (Including Combos) Protocol Passed    9/1/2018  1:50 AM       Passed - Blood pressure under 140/90 in past 12 months    BP Readings from Last 3 Encounters:   08/28/18 100/74   08/22/18 118/74   08/04/18 91/63                Passed - Recent (12 mo) or future (30 days) visit within the authorizing provider's specialty    Patient had office visit in the last 12 months or has a visit in the next 30 days with authorizing provider or within the authorizing provider's specialty.  See \"Patient Info\" tab in inbasket, or \"Choose Columns\" in Meds & Orders section of the refill encounter.           Passed - Patient is age 18 or older       Passed - Normal serum creatinine on file in past 12 months    Recent Labs   Lab Test  08/24/18   1035   CR  1.12             Passed - Normal serum potassium on file in past 12 months    Recent Labs   Lab Test  08/24/18   1035   POTASSIUM  4.2                   Passed - Normal serum sodium on file in past 12 months    Recent Labs   Lab Test  08/24/18   1035   NA  143                    OLANZapine (ZYPREXA) 2.5 MG tablet [Pharmacy Med Name: OLANZAPINE 2.5 MG TABLET]  Last Written Prescription Date:  3-  Last Fill Quantity: 90 tablet,  # refills: 1   Last office visit: 8/22/2018 with prescribing provider:     Future Office Visit:   Next 5 appointments (look out 90 days)     Sep " 04, 2018 12:20 PM CDT   SHORT with Dipak Gordillo MD   [facility] ([facility])    [address] [place] [phone] [phone]                  90 tablet 0     Sig: TAKE ONE TABLET BY MOUTH DAILY.    Antipsychotic Medications Passed    9/1/2018  1:50 AM       Passed - Blood pressure under 140/90 in past 12 months    BP Readings from Last 3 Encounters:   08/28/18 100/74   08/22/18 118/74   08/04/18 91/63                Passed - Patient is 12 years of age or older       Passed - Lipid panel on file within the past 12 months    Recent Labs   Lab Test  08/24/18   1035   02/07/12   1214   CHOL  115   < >  198   TRIG  116   < >  138   HDL  45   < >  47   LDL  47   < >  123   NHDL  70   < >   --    VLDL   --    --   28   CHOLHDLRATIO   --    --   4.2    < > = values in this interval not displayed.              Passed - CBC on file in past 12 months    Recent Labs   Lab Test  08/24/18   1035   WBC  8.2   RBC  5.11   HGB  14.5   HCT  46.5   PLT  231       For [facility] ONLY: [id number] = WBC, [id number] = RBC         Passed - Heart Rate on file within past 12 months    Pulse Readings from Last 3 Encounters:   08/28/18 62   08/22/18 62   08/04/18 67              Passed - A1c or Glucose on file in past 12 months    Recent Labs   Lab Test  08/24/18   1035   12/19/17   1806   GLC  122*   < >  142*   A1C   --    --   5.3    < > = values in this interval not displayed.       Please review patients last 3 weights. If a weight gain of >10 lbs exists, you may refill the prescription once after instructing the patient to schedule an appointment within the next 30 days.    Wt Readings from Last 3 Encounters:   08/22/18 178 lb 4.8 oz (80.9 kg)   05/25/18 178 lb (80.7 kg)   05/15/18 182 lb (82.6 kg)            Passed - Recent (6 mo) or future (30 days) visit within the authorizing provider's specialty    Patient had office visit in the last 6 months or has a visit in the next 30 days with  "authorizing provider or within the authorizing provider's specialty.  See \"Patient Info\" tab in inbasket, or \"Choose Columns\" in Meds & Orders section of the refill encounter.              "

## 2018-09-04 NOTE — TELEPHONE ENCOUNTER
Name of caller: Meghna  Relationship of Patient: Significant Other    Reason for Call: Would like a referral to the ENT since he cannot breath out of his nose. She stated Dr. Gordillo is aware, and that he has oral surgery next week and they would like to be seen by the ENT before.     Best phone number to reach pt at is: [phone]  Ok to leave a message with medical info? Yes    Pharmacy preferred (if calling for a refill): [facility]  Patient Representative - [facility]

## 2018-09-04 NOTE — PROGRESS NOTES

## 2018-09-04 NOTE — PROGRESS NOTES
SUBJECTIVE:   Cricket Miguel is a 63 year old male who presents to clinic today for the following health issues:    Follow up - recent consults/labs    Stroke specialist - Dr Ivette Hall, PMR - Dr Schmidt (9/11), Psych - Dr Castillo (9/6), Infusions Antibiotics followed by Dental implants on 9/10, PT/ST twice weekly, [facility] (9/26), decreased bumex/potassium    Notes stable from last week, Left sided facial droop, dysphagia/dysarthria, with right sided hemiparesis.    Sleep ok, appetite ok, bowels and bladder at baseline, extensive cares with Meghna MARTIN    No medication issues, recent taper of Bumex/potassium per [facility].    Reviewed labs, all quite good overall, glucose mildly elevated.    Problem list and histories reviewed & adjusted, as indicated.  Additional history: as documented    Reviewed and updated as needed this visit by clinical staff  Tobacco  Allergies  Meds  Med Hx  Surg Hx  Fam Hx  Soc Hx      Reviewed and updated as needed this visit by Provider       BP Readings from Last 3 Encounters:   09/04/18 120/68   08/28/18 100/74   08/22/18 118/74       body mass index is 27.88 kg/(m^2).    Wt Readings from Last 4 Encounters:   09/04/18 178 lb (80.7 kg)   08/22/18 178 lb 4.8 oz (80.9 kg)   05/25/18 178 lb (80.7 kg)   05/15/18 182 lb (82.6 kg)       Health Maintenance    Health Maintenance Due   Topic Date Due     HF ACTION PLAN Q3 YR  1955     WELLNESS VISIT Q1 YR  1955     COPD ACTION PLAN Q1 YR  1955     FIT Q1 YR  03/16/1963     MICROALBUMIN Q1 YEAR  02/07/2013     INFLUENZA VACCINE (1) 09/01/2018       Current Problem List    Patient Active Problem List   Diagnosis     Tobacco use disorder     Hypertension goal BP (blood pressure) < 140/90     Disease of lung     Major depressive disorder, single episode, moderate (HCC)     Advance Care Planning     Psoriasis     COPD, mild (H)     CHF (congestive heart failure), NYHA class II (H)     AR (aortic regurgitation)      AI (aortic insufficiency)     Aortic root dilatation (H)     Hyperlipidemia LDL goal <70     Health Care Home     Status post coronary angiogram     Cardiomyopathy (H)     S/P AVR (aortic valve replacement)     H/O aortic root repair     Anemia     Endocarditis     Encephalopathy     Abscess of aortic root     CVA (cerebral vascular accident) (H)     Anxiety     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     SNHL (sensorineural hearing loss)     Hx of aortic root repair     Prophylactic antibiotic     Elevated PSA     Constipation       Past Medical History    Past Medical History:   Diagnosis Date     Abscess of aortic root 10/06/2017     AI (aortic insufficiency)     severe     Anxiety      Aortic root dilatation (H)      AR (aortic regurgitation)     severe     Cardiomyopathy (H)      CHF (congestive heart failure), NYHA class II (H)      Constipation     Dr Mcghee     COPD, mild (H)     spirometry 12/16     CVA (cerebral vascular accident) (H) 10/06/2017    septic emboli - MSSA - cerebellum, Left MCA, Rt Occipital, Aphasia, Left facial paralysis, Right>Left UE/LE weakness, Left visual field cut, moderate to severe encephalopathy, cognitive dysfunction, word finding     Depression 10/06/2017     Elevated PSA 05/2018    PSA = 8, Dr Garcia     Heart murmur      Hyperlipidemia LDL goal <70 10/31/2010     Hypertension goal BP (blood pressure) < 140/90      Inguinal hernia      left lung nodule  1/29/2008     Major depressive disorder, single episode, moderate (H)      Psoriasis childhood     SNHL (sensorineural hearing loss)     Left, secondary to CVA     Tobacco use disorder        Past Surgical History    Past Surgical History:   Procedure Laterality Date     ARTHROSCOPY KNEE INCISION AND DRAINAGE Left 10/8/2017    Arthroscopic Incision and Drainage of Left Knee - Dr Munoz     HC SHOULDER ARTHROSCOPY, DX  2001    Arthroscopy, Shoulder RT Dr OSIRIS Andersen     HC SHOULDER ARTHROSCOPY, DX   1/04    LT arthroscopy Dr OSIRIS Andersen     HERNIA REPAIR, UMBILICAL  1/08    incarcerated - dr rockwell     HERNIORRHAPHY INGUINAL  12/21/2012    HERNIORRHAPHY INGUINAL;  Right Inguinal Hernia Repair with mesh ;  Surgeon: Mello Rockwell MD;  Location: [facility] OR     REPAIR ANEURYSM ASCENDING AORTA N/A 12/19/2017    REPAIR ANEURYSM ASCENDING AORTA;  REDO Median STERNOTOMY, FEMORAL CANNULATION, Lysis of adhesions, AORTIC ROOT VALVE HOMOGRAFT with 26mm x 7.0cm Cryolife Aortic valve and conduit - Dr Bowers     REPLACE VALVE AORTIC N/A 5/17/2016    REPLACE VALVE AORTIC - Dr Bowers     ROTATOR CUFF REPAIR RT/LT  11/10    Rt arthroscopy - Dr OSIRIS Andersen     SURGICAL HISTORY OF -   5/16    AVR, severe AR, aortic root repair     TRACHEOSTOMY PERCUTANEOUS  10/27/2017            Current Medications    Current Outpatient Prescriptions   Medication Sig Dispense Refill     albuterol (PROAIR HFA/PROVENTIL HFA/VENTOLIN HFA) 108 (90 Base) MCG/ACT inhaler Inhale 2 puffs into the lungs every 6 hours as needed for shortness of breath / dyspnea or wheezing 1 Inhaler 3     aspirin 81 MG chewable tablet 1 tablet (81 mg) by Oral or Feeding Tube route daily 60 tablet 1     atorvastatin (LIPITOR) 40 MG tablet Take 1 tablet (40 mg) by mouth daily 60 tablet 1     bumetanide (BUMEX) 1 MG tablet 1/2 tab daily or as directed 90 tablet 3     Carboxymethylcellulose Sod PF (REFRESH PLUS) 0.5 % SOLN ophthalmic solution Place 1 drop Into the left eye 3 times daily as needed (to flush debris from eye) 1 Bottle 1     carvedilol (COREG) 12.5 MG tablet Take 0.5 tablets (6.25 mg) by mouth 2 times daily 180 tablet 1     famotidine (PEPCID) 20 MG tablet Take 1 tablet (20 mg) by mouth daily 90 tablet 1     Ferrous Sulfate (IRON) 325 (65 FE) MG tablet Take 1 tablet by mouth every evening       FLUoxetine (PROZAC) 20 MG capsule Take 1 capsule (20 mg) by mouth daily 90 capsule 3     losartan (COZAAR) 50 MG tablet TAKE 1/2 TABLET BY MOUTH EVERY 12 HOURS 90 tablet 3      mirtazapine (REMERON) 30 MG tablet Take 1 tablet (30 mg) by mouth At Bedtime 90 tablet 3     multivitamin, therapeutic with minerals (THERA-VIT-M) TABS tablet Take 1 tablet by mouth daily 30 each 0     OLANZapine (ZYPREXA) 2.5 MG tablet TAKE ONE TABLET BY MOUTH DAILY. 90 tablet 0     potassium chloride SA (KLOR-CON) 20 MEQ CR tablet Take 1 tablet (20 mEq) by mouth daily 30 tablet 3       Allergies    Allergies   Allergen Reactions     Lisinopril Swelling     One-sided facial swelling after being on lisinopril/HCTZ for one week.        Immunizations    Immunization History   Administered Date(s) Administered     Influenza (High Dose) 3 valent vaccine 09/04/2018     Influenza Vaccine IM 3yrs+ 4 Valent IIV4 10/28/2017     Pneumococcal 23 valent 11/04/2017     TD (ADULT, 7+) 04/14/1999       Family History    Family History   Problem Relation Age of Onset     Genetic Disorder Mother      Bone plateover growth Agustin. shoulder surgeries     Cancer Mother      brain cancer     Other Cancer Mother      Alzheimer Disease Father        Social History    Social History     Social History     Marital status:      Spouse name: N/A     Number of children: 3     Years of education: 12     Occupational History     Not on file.     Social History Main Topics     Smoking status: Former Smoker     Packs/day: 1.00     Years: 35.00     Types: Cigarettes     Quit date: 4/1/2016     Smokeless tobacco: Never Used      Comment: 4/2016     Alcohol use 0.0 oz/week      Comment: 1 drink per month,maybe     Drug use: Yes     Special: Marijuana      Comment: marijuana- daily previously     Sexual activity: Yes     Partners: Female     Other Topics Concern     Caffeine Concern Yes     3 cups     Sleep Concern No     Special Diet No     trying to watch salt     Exercise Yes     walking     Seat Belt No     Parent/Sibling W/ Cabg, Mi Or Angioplasty Before 65f 55m? No     Social History Narrative       All above reviewed and updated, all stable  "unless otherwise noted    Recent labs reviewed    ROS:  CONSTITUTIONAL: NEGATIVE for fever, chills, change in weight  INTEGUMENTARY/SKIN: NEGATIVE for worrisome rashes, moles or lesions  EYES: NEGATIVE for vision changes or irritation  ENT/MOUTH: NEGATIVE for ear, mouth and throat problems  RESP: NEGATIVE for significant cough or SOB  CV: NEGATIVE for chest pain, palpitations or peripheral edema  GI: NEGATIVE for nausea, abdominal pain, heartburn, or change in bowel habits  : NEGATIVE for frequency, dysuria, or hematuria  MUSCULOSKELETAL: NEGATIVE for significant arthralgias or myalgia  NEURO: NEGATIVE for weakness, dizziness or paresthesias  ENDOCRINE: NEGATIVE for temperature intolerance, skin/hair changes  HEME: NEGATIVE for bleeding problems  PSYCHIATRIC: NEGATIVE for changes in mood or affect    OBJECTIVE:                                                    /68  Pulse 61  Temp 98  F (36.7  C) (Oral)  Ht 5' 7\" (1.702 m)  Wt 178 lb (80.7 kg)  SpO2 93%  BMI 27.88 kg/m2  Body mass index is 27.88 kg/(m^2).  GENERAL: healthy, alert and no distress  EYES: Eyes grossly normal to inspection, extraocular movements - intact, and PERRL  HENT: ear canals- normal; TMs- normal; Nose- normal; Mouth- no ulcers, no lesions  NECK: no tenderness, no adenopathy, no asymmetry, no masses, no stiffness; thyroid- normal to palpation  RESP: lungs clear to auscultation - no rales, no rhonchi, no wheezes  CV: regular rates and rhythm, normal S1 S2, no S3 or S4 and no murmur, no click or rub -  ABDOMEN: soft, no tenderness, no  hepatosplenomegaly, no masses, normal bowel sounds  MS: extremities- no gross deformities noted, no edema  SKIN: no suspicious lesions, no rashes  NEURO: strength and tone- normal, sensory exam- grossly normal, mentation- intact, speech- normal, reflexes- symmetric  BACK: no CVA tenderness, no paralumbar tenderness  PSYCH: Alert and oriented times 3; speech- coherent , normal rate and volume; able to " articulate logical thoughts, able to abstract reason, no tangential thoughts, no hallucinations or delusions, affect- normal  LYMPHATICS: ant. cervical- normal, post. cervical- normal, axillary- normal, supraclavicular- normal, inguinal- normal    DIAGNOSTICS/PROCEDURES:                                                      Reviewed recent labs     ASSESSMENT/PLAN:                                                        ICD-10-CM    1. Cerebrovascular accident (CVA), unspecified mechanism (H) I63.9    2. 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 I39    3. Congestive heart failure, NYHA class 2, unspecified congestive heart failure type (H) I50.9    4. Cardiomyopathy, unspecified type (H) I42.9    5. COPD, mild (H) J44.9    6. S/P AVR (aortic valve replacement) Z95.2    7. H/O aortic root repair Z98.890    8. Hypertension goal BP (blood pressure) < 140/90 I10    9. Hyperlipidemia LDL goal <70 E78.5    10. Major depressive disorder, single episode, moderate (HCC) F32.1    11. Anxiety F41.9    12. Medication monitoring encounter Z51.81    13. Need for prophylactic vaccination and inoculation against influenza Z23 FLU VACCINE, INCREASED ANTIGEN, PRESV FREE, AGE 65+ [29807]     Vaccine Administration, Initial [99827]       Discussed treatment/modality options, including risk and benefits, he desires flu vac, labs reviewed, consults/PT/ST as above, close follow up. All diagnosis above reviewed and noted above, otherwise stable.  See [facility] orders for further details.  Follow up in 3 month(s) and as needed.    Return in about 3 months (around 12/4/2018), or if symptoms worsen or fail to improve, for Medication Recheck Visit.    Health Maintenance Due   Topic Date Due     HF ACTION PLAN Q3 YR  1955     WELLNESS VISIT Q1 YR  1955     COPD ACTION PLAN Q1 YR  1955     FIT Q1 YR  03/16/1963     MICROALBUMIN Q1 YEAR  02/07/2013     INFLUENZA VACCINE (1) 09/01/2018       See Patient  Instructions           Dipak Gordillo MD [address]  [phone] Fax

## 2018-09-04 NOTE — MR AVS SNAPSHOT
After Visit Summary   9/4/2018    Cricket Miguel    MRN: [id number]           Patient Information     Date Of Birth          1955        Visit Information        Provider Department      9/4/2018 12:20 PM Dipak Gordillo MD [facility]  [place]        Today's Diagnoses     Cerebrovascular accident (CVA), unspecified mechanism (H)    -  1    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        Congestive heart failure, NYHA class 2, unspecified congestive heart failure type (H)        Cardiomyopathy, unspecified type (H)        COPD, mild (H)        S/P AVR (aortic valve replacement)        H/O aortic root repair        Hypertension goal BP (blood pressure) < 140/90        Hyperlipidemia LDL goal <70        Major depressive disorder, single episode, moderate (HCC)        Anxiety        Medication monitoring encounter        Need for prophylactic vaccination and inoculation against influenza          Care Instructions        [facility] [place]                        To reach your care team during and after hours:   [phone]  To reach our pharmacy:        [phone]    Clinic Hours                        Our clinic hours are:    Monday   7:30 am to 7:00 pm                  Tuesday through Friday 7:30 am to 5:00 pm                             Saturday   8:00 am to 12:00 pm      Sunday   Closed      Pharmacy Hours                        Our pharmacy hours are:    Monday   8:30 am to 7:00 pm       Tuesday to Friday  8:30 am to 6:00 pm                       Saturday    9:00 am to 1:00 pm              Sunday    Closed              There is also information available at our web site:  [email or web address]    If your provider ordered any lab tests and you do not receive the results within 10 business days, please call the clinic.    If you need a medication refill please contact your pharmacy.  Please allow 2-3 business days for your refill to be  completed.    Our clinic offers telephone visits and e visits.  Please ask one of your team members to explain more.      Use Transfer[employer] (secure email communication and access to your chart) to send your primary care provider a message or make an appointment. Ask someone on your Team how to sign up for [employer].  Immunizations                      Immunization History   Administered Date(s) Administered     Influenza Vaccine IM 3yrs+ 4 Valent IIV4 10/28/2017     Pneumococcal 23 valent 11/04/2017     TD (ADULT, 7+) 04/14/1999        Health Maintenance                         Health Maintenance Due   Topic Date Due     Heart Failure Action Plan Reviewed Every 3 Years  1955     Wellness Visit with your Primary Provider - yearly  1955     COPD ACTION PLAN Q1 YR  1955     Colon Cancer Screening - FIT Test - yearly  03/16/1963     Microalbumin Lab - yearly  02/07/2013     Flu Vaccine (1) 09/01/2018               Follow-ups after your visit        Follow-up notes from your care team     Return in about 3 months (around 12/4/2018), or if symptoms worsen or fail to improve, for Medication Recheck Visit.      Your next 10 appointments already scheduled     Sep 10, 2018  9:30 AM CDT   Level 3 with  INFUSION CHAIR 8   [facility] ([facility])    [facility]  [address]  [facility] [address] [phone]            Sep 26, 2018  1:00 PM CDT   LAB with BAEZA LAB   [facility] PHYSICIANS [facility] AT [place] ([facility])    [address]  [facility] [address] [phone]           Please do not eat 10-12 hours before your appointment if you are coming in fasting for labs on lipids, cholesterol, or glucose (sugar). This does not apply to pregnant women. Water, hot tea and black coffee (with nothing added) are okay. Do not drink other fluids, diet soda or chew gum.            Sep 26, 2018  1:50 PM  CDT   Core Return with BIBI Bettencourt CNP   [facility] ([facility])    [address]  [facility] [address] [phone] OPT 2            Dec 04, 2018 12:00 PM CST   Office Visit with Dipak Gordillo MD   [facility] ([facility])    [address] [address] [phone]           Bring a current list of meds and any records pertaining to this visit. For Physicals, please bring immunization records and any forms needing to be filled out. Please arrive 10 minutes early to complete paperwork.              Who to contact     If you have questions or need follow up information about today's clinic visit or your schedule please contact [facility] directly at [phone].  Normal or non-critical lab and imaging results will be communicated to you by MyChart, letter or phone within 4 business days after the clinic has received the results. If you do not hear from us within 7 days, please contact the clinic through [employer]hart or phone. If you have a critical or abnormal lab result, we will notify you by phone as soon as possible.  Submit refill requests through [employer] or call your pharmacy and they will forward the refill request to us. Please allow 3 business days for your refill to be completed.          Additional Information About Your Visit        MyChart Information     [employer] gives you secure access to your electronic health record. If you see a primary care provider, you can also send messages to your care team and make appointments. If you have questions, please call your primary care clinic.  If you do not have a primary care provider, please call [phone] and they will assist you.        Care EveryWhere ID     This is your Care EveryWhere ID. This could be used by other organizations to access your [facility] medical records  [id number]        Your Vitals  "Were     Pulse Temperature Height Pulse Oximetry BMI (Body Mass Index)       61 98  F (36.7  C) (Oral) 5' 7\" (1.702 m) 93% 27.88 kg/m2        Blood Pressure from Last 3 Encounters:   09/04/18 120/68   08/28/18 100/74   08/22/18 118/74    Weight from Last 3 Encounters:   09/04/18 178 lb (80.7 kg)   08/22/18 178 lb 4.8 oz (80.9 kg)   05/25/18 178 lb (80.7 kg)              We Performed the Following     FLU VACCINE, INCREASED ANTIGEN, PRESV FREE, AGE 65+ [51919]     Vaccine Administration, Initial [06499]        Primary Care Provider Office Phone # Fax #    Dipak Gordillo [phone]       [address] [place] [address]        Equal Access to Services     [facility] : Hadii jenise flynn Sonico, waaxda luqadaha, qaybta kaalmada adejanelleyamalik, freddie rodríguez . So [facility] [phone].    ATENCIÓN: Si habla español, tiene a mendiola disposición servicios gratuitos de asistencia lingüística. Llame al [phone].    We comply with applicable federal civil rights laws and [place] laws. We do not discriminate on the basis of race, color, national origin, age, disability, sex, sexual orientation, or gender identity.            Thank you!     Thank you for choosing [facility]  for your care. Our goal is always to provide you with excellent care. Hearing back from our patients is one way we can continue to improve our services. Please take a few minutes to complete the written survey that you may receive in the mail after your visit with us. Thank you!             Your Updated Medication List - Protect others around you: Learn how to safely use, store and throw away your medicines at www.disposemymeds.org.          This list is accurate as of 9/4/18  7:25 PM.  Always use your most recent med list.                   Brand Name Dispense Instructions for use Diagnosis    albuterol 108 (90 Base) MCG/ACT inhaler    PROAIR HFA/PROVENTIL HFA/VENTOLIN HFA    1 Inhaler    " Inhale 2 puffs into the lungs every 6 hours as needed for shortness of breath / dyspnea or wheezing    Upper respiratory tract infection, unspecified type       aspirin 81 MG chewable tablet     60 tablet    1 tablet (81 mg) by Oral or Feeding Tube route daily    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, Cerebrovascular accident (CVA) due to other mechanism (H)       atorvastatin 40 MG tablet    LIPITOR    60 tablet    Take 1 tablet (40 mg) by mouth daily    Cerebrovascular accident (CVA) due to other mechanism (H)       bumetanide 1 MG tablet    BUMEX    90 tablet    1/2 tab daily or as directed    Chronic systolic congestive heart failure (H)       Carboxymethylcellulose Sod PF 0.5 % Soln ophthalmic solution    REFRESH PLUS    1 Bottle    Place 1 drop Into the left eye 3 times daily as needed (to flush debris from eye)    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       carvedilol 12.5 MG tablet    COREG    180 tablet    Take 0.5 tablets (6.25 mg) by mouth 2 times daily    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, Hypertension goal BP (blood pressure) < 140/90       famotidine 20 MG tablet    PEPCID    90 tablet    Take 1 tablet (20 mg) by mouth daily    Medication monitoring encounter       FLUoxetine 20 MG capsule    PROzac    90 capsule    Take 1 capsule (20 mg) by mouth daily    Major depressive disorder, single episode, moderate (H), Anxiety       iron 325 (65 Fe) MG tablet      Take 1 tablet by mouth every evening        losartan 50 MG tablet    COZAAR    90 tablet    TAKE 1/2 TABLET BY MOUTH EVERY 12 HOURS    Hypertension goal BP (blood pressure) < 140/90, Congestive heart failure, NYHA class 2, unspecified congestive heart failure type (H), Cardiomyopathy, unspecified type (H)       mirtazapine 30 MG tablet    REMERON    90 tablet    Take 1 tablet (30 mg) by mouth At Bedtime    Mild single current episode of major  depressive disorder (H), Anxiety       multivitamin, therapeutic with minerals Tabs tablet     30 each    Take 1 tablet by mouth daily    Cerebrovascular accident (CVA) due to other mechanism (H)       OLANZapine 2.5 MG tablet    zyPREXA    90 tablet    TAKE ONE TABLET BY MOUTH DAILY.    Cerebrovascular accident (CVA) due to other mechanism (H), Anxiety, Delirium due to another medical condition       potassium chloride SA 20 MEQ CR tablet    KLOR-CON    30 tablet    Take 1 tablet (20 mEq) by mouth daily    Hypokalemia

## 2018-09-05 NOTE — TELEPHONE ENCOUNTER
Referral placed:   Your provider has referred you to: N: [facility] [phone]   [email or web address]    Called patient @ # below - spoke with Meghna, spouse (CTC on file)  Referral information given      Joycelyn Jones RN  [place] [facility]

## 2018-09-06 NOTE — TELEPHONE ENCOUNTER
Received call from  infusion clinic stating that patient has dental procedure scheduled for 9/10/18 and appointment for pre antibiotics but there are no antibiotics ordered. Order placed.

## 2018-09-10 NOTE — TELEPHONE ENCOUNTER
Calling to verify if the pt can take tylenol or not?     Pt had oral surgery today.      [phone] ok to ROXANA.    Florence Castro RN  [facility]

## 2018-09-10 NOTE — MR AVS SNAPSHOT
After Visit Summary   9/10/2018    Cricket Miguel    MRN: [id number]           Patient Information     Date Of Birth          1955        Visit Information        Provider Department      9/10/2018 9:30 AM  INFUSION CHAIR 14 [facility]        Today's Diagnoses     Prophylactic antibiotic    -  1    Acute bacterial endocarditis           Follow-ups after your visit        Your next 10 appointments already scheduled     Sep 26, 2018  1:00 PM CDT   LAB with BAEZA LAB   [facility] AT [place] ([facility])    [address] [place] [address]   [phone]           Please do not eat 10-12 hours before your appointment if you are coming in fasting for labs on lipids, cholesterol, or glucose (sugar). This does not apply to pregnant women. Water, hot tea and black coffee (with nothing added) are okay. Do not drink other fluids, diet soda or chew gum.            Sep 26, 2018  1:50 PM CDT   Core Return with BIBI Bettencourt CNP   [facility] ([facility])    [address] [place], [place] [id number]  [facility] [address]   [phone] OPT 2            Dec 04, 2018 12:00 PM CST   Office Visit with Dipak Gordillo MD   [facility]    [address] [place] [address] [phone]           Bring a current list of meds and any records pertaining to this visit. For Physicals, please bring immunization records and any forms needing to be filled out. Please arrive 10 minutes early to complete paperwork.              Who to contact     If you have questions or need follow up information about today's clinic visit or your schedule please contact [facility] directly at [phone].  Normal or non-critical lab and imaging results will be communicated to you by MyChart, letter or  phone within 4 business days after the clinic has received the results. If you do not hear from us within 7 days, please contact the clinic through [employer] or phone. If you have a critical or abnormal lab result, we will notify you by phone as soon as possible.  Submit refill requests through [employer] or call your pharmacy and they will forward the refill request to us. Please allow 3 business days for your refill to be completed.          Additional Information About Your Visit        [employer] Information     [employer] gives you secure access to your electronic health record. If you see a primary care provider, you can also send messages to your care team and make appointments. If you have questions, please call your primary care clinic.  If you do not have a primary care provider, please call [phone] and they will assist you.        Care EveryWhere ID     This is your Care EveryWhere ID. This could be used by other organizations to access your [facility] medical records  [id number]        Your Vitals Were     Pulse Temperature Respirations Pulse Oximetry          55 97.1  F (36.2  C) (Axillary) 16 93%         Blood Pressure from Last 3 Encounters:   09/10/18 126/87   09/04/18 120/68   08/28/18 100/74    Weight from Last 3 Encounters:   09/04/18 80.7 kg (178 lb)   08/22/18 80.9 kg (178 lb 4.8 oz)   05/25/18 80.7 kg (178 lb)              Today, you had the following     No orders found for display       Primary Care Provider Office Phone # Fax #    Dipak Gordillo [phone]       [address] [place] [address]        Equal Access to Services     [facility] : Hadii jenise nova hadasho Soomaali, waaxda luqadaha, qaybta kaalmada adeegyada, waxay joce rodríguez . So [facility] [phone].    ATENCIÓN: Si habla español, tiene a mendiola disposición servicios gratuitos de asistencia lingüística. Llame al [phone].    We comply with applicable federal civil rights laws and [place]  laws. We do not discriminate on the basis of race, color, national origin, age, disability, sex, sexual orientation, or gender identity.            Thank you!     Thank you for choosing [facility]  for your care. Our goal is always to provide you with excellent care. Hearing back from our patients is one way we can continue to improve our services. Please take a few minutes to complete the written survey that you may receive in the mail after your visit with us. Thank you!             Your Updated Medication List - Protect others around you: Learn how to safely use, store and throw away your medicines at www.disposemymeds.org.          This list is accurate as of 9/10/18 12:21 PM.  Always use your most recent med list.                   Brand Name Dispense Instructions for use Diagnosis    albuterol 108 (90 Base) MCG/ACT inhaler    PROAIR HFA/PROVENTIL HFA/VENTOLIN HFA    1 Inhaler    Inhale 2 puffs into the lungs every 6 hours as needed for shortness of breath / dyspnea or wheezing    Upper respiratory tract infection, unspecified type       ampicillin 2 g vial    OMNIPEN    1 each    Inject 2 g into the vein once for 1 dose - Intravenous    Prophylactic antibiotic       aspirin 81 MG chewable tablet     60 tablet    1 tablet (81 mg) by Oral or Feeding Tube route daily    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, Cerebrovascular accident (CVA) due to other mechanism (H)       atorvastatin 40 MG tablet    LIPITOR    60 tablet    Take 1 tablet (40 mg) by mouth daily    Cerebrovascular accident (CVA) due to other mechanism (H)       bumetanide 1 MG tablet    BUMEX    90 tablet    1/2 tab daily or as directed    Chronic systolic congestive heart failure (H)       Carboxymethylcellulose Sod PF 0.5 % Soln ophthalmic solution    REFRESH PLUS    1 Bottle    Place 1 drop Into the left eye 3 times daily as needed (to flush debris from eye)    Endocarditis and  heart valve disorders in diseases classified elsewhere       carvedilol 12.5 MG tablet    COREG    180 tablet    Take 0.5 tablets (6.25 mg) by mouth 2 times daily    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, Hypertension goal BP (blood pressure) < 140/90       famotidine 20 MG tablet    PEPCID    90 tablet    Take 1 tablet (20 mg) by mouth daily    Medication monitoring encounter       FLUoxetine 20 MG capsule    PROzac    90 capsule    Take 1 capsule (20 mg) by mouth daily    Major depressive disorder, single episode, moderate (H), Anxiety       iron 325 (65 Fe) MG tablet      Take 1 tablet by mouth every evening        losartan 50 MG tablet    COZAAR    90 tablet    TAKE 1/2 TABLET BY MOUTH EVERY 12 HOURS    Hypertension goal BP (blood pressure) < 140/90, Congestive heart failure, NYHA class 2, unspecified congestive heart failure type (H), Cardiomyopathy, unspecified type (H)       mirtazapine 30 MG tablet    REMERON    90 tablet    Take 1 tablet (30 mg) by mouth At Bedtime    Mild single current episode of major depressive disorder (H), Anxiety       multivitamin, therapeutic with minerals Tabs tablet     30 each    Take 1 tablet by mouth daily    Cerebrovascular accident (CVA) due to other mechanism (H)       potassium chloride SA 20 MEQ CR tablet    KLOR-CON    30 tablet    Take 1 tablet (20 mEq) by mouth daily    Hypokalemia       vancomycin 2,000 mg     1 each    Inject 2,000 mg into the vein every 12 hours    Prophylactic antibiotic

## 2018-09-10 NOTE — PROGRESS NOTES
Infusion Nursing Note:  Cricket Miguel presents today for Vanco/Ampicillin.    Patient seen by provider today: No   present during visit today: Not Applicable.    Note: N/A.    Intravenous Access:  Peripheral IV placed.    Treatment Conditions:  Not Applicable.      Post Infusion Assessment:  Patient tolerated infusion without incident.  Blood return noted pre and post infusion.  Site patent and intact, free from redness, edema or discomfort.  No evidence of extravasations.  Access discontinued per protocol.    Discharge Plan:   Discharge instructions reviewed with: Patient and Family.  Patient and/or family verbalized understanding of discharge instructions and all questions answered.  AVS to patient via Building[employer]ART.  Patient will return PRN for next appointment.   Patient discharged in stable condition accompanied by: wife.  Departure Mode: Wheelchair.    Alexandr Reaves RN

## 2018-09-11 NOTE — TELEPHONE ENCOUNTER
Tylenol 500 mg 1-2 every 8 hrs as needed, please check and make sure he is not on any other pain medications with tylenol.

## 2018-09-11 NOTE — TELEPHONE ENCOUNTER
Significant other, Meghna, notified by phone.  KHANH on file to speak with Meghna.  She said he is not on any other pain medication with Tylenol.  She verbalized understanding and agreed with plan.    RANJEET Crow, RN, N  [facility]) [phone]

## 2018-09-11 NOTE — TELEPHONE ENCOUNTER
Spoke with patient's SO. Patient's SO states that the oral surgeon wants the patient to be on amoxacillin for 21 days after his procedure that he had. Reviewed with SO that there isn't any reason that the patient couldn't take the antibiotics from a cardiac standpoint. Patient's SO verbalized understanding and agreed with plan of care.

## 2018-09-11 NOTE — TELEPHONE ENCOUNTER
VM from patient's SO, stating that they are inquiring if it is ok for the patient to take amoxacillin for 21 days that is being prescribed by patient's PCP. Attempted to contact patient's SO, she did not answer. Left message to call back Team 4 direct phone number.

## 2018-09-26 NOTE — PROGRESS NOTES
HPI and Plan:   See [id number]    Orders Placed This Encounter   Procedures     Basic metabolic panel     [facility]       Orders Placed This Encounter   Medications     bumetanide (BUMEX) 1 MG tablet     Sig: hold     potassium chloride SA (KLOR-CON) 20 MEQ CR tablet     Sig: hold     Refill:  0       Medications Discontinued During This Encounter   Medication Reason     vancomycin 2,000 mg Stopped by Patient     ampicillin (OMNIPEN) 2 g vial Stopped by Patient     bumetanide (BUMEX) 1 MG tablet Reorder     potassium chloride SA (KLOR-CON) 20 MEQ CR tablet Reorder         Encounter Diagnoses   Name Primary?     Chronic systolic congestive heart failure (H)      Hypokalemia        CURRENT MEDICATIONS:  Current Outpatient Prescriptions   Medication Sig Dispense Refill     albuterol (PROAIR HFA/PROVENTIL HFA/VENTOLIN HFA) 108 (90 Base) MCG/ACT inhaler Inhale 2 puffs into the lungs every 6 hours as needed for shortness of breath / dyspnea or wheezing 1 Inhaler 3     aspirin 81 MG chewable tablet 1 tablet (81 mg) by Oral or Feeding Tube route daily 60 tablet 1     atorvastatin (LIPITOR) 40 MG tablet Take 1 tablet (40 mg) by mouth daily 60 tablet 1     bumetanide (BUMEX) 1 MG tablet hold       Carboxymethylcellulose Sod PF (REFRESH PLUS) 0.5 % SOLN ophthalmic solution Place 1 drop Into the left eye 3 times daily as needed (to flush debris from eye) 1 Bottle 1     carvedilol (COREG) 12.5 MG tablet Take 0.5 tablets (6.25 mg) by mouth 2 times daily 180 tablet 1     famotidine (PEPCID) 20 MG tablet Take 1 tablet (20 mg) by mouth daily 90 tablet 1     Ferrous Sulfate (IRON) 325 (65 FE) MG tablet Take 1 tablet by mouth every evening       FLUoxetine (PROZAC) 20 MG capsule Take 1 capsule (20 mg) by mouth daily 90 capsule 3     losartan (COZAAR) 50 MG tablet TAKE 1/2 TABLET BY MOUTH EVERY 12 HOURS 90 tablet 3     mirtazapine (REMERON) 30 MG tablet Take 1 tablet (30 mg) by mouth At Bedtime 90 tablet 3     multivitamin,  therapeutic with minerals (THERA-VIT-M) TABS tablet Take 1 tablet by mouth daily 30 each 0     potassium chloride SA (KLOR-CON) 20 MEQ CR tablet hold  0     [DISCONTINUED] bumetanide (BUMEX) 1 MG tablet 1/2 tab daily or as directed 90 tablet 3       ALLERGIES     Allergies   Allergen Reactions     Baclofen Nausea and Vomiting     Loss of bladder control     Lisinopril Swelling     One-sided facial swelling after being on lisinopril/HCTZ for one week.        PAST MEDICAL HISTORY:  Past Medical History:   Diagnosis Date     Abscess of aortic root 10/06/2017     AI (aortic insufficiency)     severe     Anxiety      Aortic root dilatation (H)      AR (aortic regurgitation)     severe     Cardiomyopathy (H)      CHF (congestive heart failure), NYHA class II (H)      Constipation     Dr Mcghee     COPD, mild (H)     spirometry 12/16     CVA (cerebral vascular accident) (H) 10/06/2017    septic emboli - MSSA - cerebellum, Left MCA, Rt Occipital, Aphasia, Left facial paralysis, Right>Left UE/LE weakness, Left visual field cut, moderate to severe encephalopathy, cognitive dysfunction, word finding     Depression 10/06/2017     Elevated PSA 05/2018    PSA = 8, Dr Garcia     Heart murmur      Hyperlipidemia LDL goal <70 10/31/2010     Hypertension goal BP (blood pressure) < 140/90      Inguinal hernia      left lung nodule  1/29/2008     Major depressive disorder, single episode, moderate (H)      Psoriasis childhood     SNHL (sensorineural hearing loss)     Left, secondary to CVA     Tobacco use disorder        PAST SURGICAL HISTORY:  Past Surgical History:   Procedure Laterality Date     ARTHROSCOPY KNEE INCISION AND DRAINAGE Left 10/8/2017    Arthroscopic Incision and Drainage of Left Knee - Dr Munoz     HC SHOULDER ARTHROSCOPY, DX  2001    Arthroscopy, Shoulder RT Dr OSIRIS Andersen     HC SHOULDER ARTHROSCOPY, DX  1/04    LT arthroscopy Dr OSIRIS Andersen     HERNIA REPAIR, UMBILICAL  1/08    incarcerated - dr puente      HERNIORRHAPHY INGUINAL  12/21/2012    HERNIORRHAPHY INGUINAL;  Right Inguinal Hernia Repair with mesh ;  Surgeon: Mello Rockwell MD;  Location: [facility] OR     REPAIR ANEURYSM ASCENDING AORTA N/A 12/19/2017    REPAIR ANEURYSM ASCENDING AORTA;  REDO Median STERNOTOMY, FEMORAL CANNULATION, Lysis of adhesions, AORTIC ROOT VALVE HOMOGRAFT with 26mm x 7.0cm Cryolife Aortic valve and conduit - Dr Bowers     REPLACE VALVE AORTIC N/A 5/17/2016    REPLACE VALVE AORTIC - Dr Bowers     ROTATOR CUFF REPAIR RT/LT  11/10    Rt arthroscopy - Dr OSIRIS Andersen     SURGICAL HISTORY OF -   5/16    AVR, severe AR, aortic root repair     TRACHEOSTOMY PERCUTANEOUS  10/27/2017            FAMILY HISTORY:  Family History   Problem Relation Age of Onset     Genetic Disorder Mother      Bone plateover growth Agustin. shoulder surgeries     Cancer Mother      brain cancer     Other Cancer Mother      Alzheimer Disease Father        SOCIAL HISTORY:  Social History     Social History     Marital status:      Spouse name: N/A     Number of children: 3     Years of education: 12     Social History Main Topics     Smoking status: Former Smoker     Packs/day: 1.00     Years: 35.00     Types: Cigarettes     Quit date: 4/1/2016     Smokeless tobacco: Never Used      Comment: 4/2016     Alcohol use 0.0 oz/week      Comment: 1 drink per month,maybe     Drug use: Yes     Special: Marijuana      Comment: marijuana- daily previously     Sexual activity: Yes     Partners: Female     Other Topics Concern     Caffeine Concern Yes     3 cups     Sleep Concern No     Special Diet No     trying to watch salt     Exercise Yes     walking     Seat Belt No     Parent/Sibling W/ Cabg, Mi Or Angioplasty Before 65f 55m? No     Social History Narrative       Review of Systems:  Skin:  Positive for hair changes rash on behind, loosing lots of hair    Eyes:  Positive for glasses left eye is bad   ENT:  Positive for hearing loss def in left ear   Respiratory:  Positive for  "cough;mucoid expectorant occas. Cough.  Clearing up   Cardiovascular:    Positive for Rehab for stroke  Gastroenterology: Positive for nausea Getting better  Genitourinary:  Positive for urinary frequency has to really concentrate to urinate   Musculoskeletal:  Positive for joint stiffness gets sore behind   Neurologic:  Positive for paralysis;stroke right sided weakness, left side facial droop  Psychiatric:  Positive for excessive stress;anxiety;depression    Heme/Lymph/Imm:  Negative      Endocrine:  Negative        Physical Exam:  Vitals: /62  Pulse 58  Ht 1.702 m (5' 7\")  Wt 82 kg (180 lb 11.2 oz)  SpO2 96%  BMI 28.3 kg/m2    Constitutional:  cooperative chronically ill      Skin:  warm and dry to the touch surgical scars well-healed        Head:           Eyes:      left lid lag and facial droop    Lymph:      ENT:  no pallor or cyanosis        Neck:  JVP normal        Respiratory:  normal breath sounds, clear to auscultation, normal A-P diameter, normal symmetry, normal respiratory excursion, no use of accessory muscles         Cardiac: regular rhythm                pulses full and equal                                        GI:           Extremities and Muscular Skeletal:  no edema              Neurological:      left facial droop, right hemiparesis.    Psych:  Alert and Oriented x 3        CC  Candice HAYNES Mannchen, APRN CNP  [address]  [id number]  [place] [address]              "

## 2018-09-26 NOTE — LETTER
9/26/2018      Dipak Gordillo MD  [address] [facility] [address]      RE: Cricket Miguel       Dear Colleague,    I had the pleasure of seeing Cricket Miguel in the [facility].    Service Date: 09/26/2018      HISTORY OF PRESENT ILLNESS:     I had the pleasure of seeing Mr. 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  He had dental work done in the meantime, which may have been the source of the infection.  His cardiomyopathy with decreased LV function improved over time.  His echocardiogram 04/2018 showed improved LVEF closer to 50%.      In May of this year, he presented to the emergency room and diagnosed with C. difficile and discharged on antibiotics.      He follows with Dr. Dipak Gordillo for his general care.  His weight is stable at 178 pounds.      The patient recently followed up at [facility] requesting consideration of weaning the diuretic and the potassium.  He has had no signs or symptoms of congestive heart failure.  I decreased Bumex in half and decreased the potassium tablet.  He returns today in followup.  He recently had significant dental work on multiple antibiotics.  He states he is feeling well.  No complaints of increased shortness of breath, orthopnea, PND, syncope or near-syncope.  His weight is stable at 178-180 pounds on a chair scale.      DIAGNOSTICS:   Sodium 140, potassium 4.3, BUN 20, creatinine 1.55 up from 1.12, GFR 45.          IMPRESSION AND PLAN:   1.  History of infective endocarditis, status post aortic valve and ascending aortic replacement, subsequent stroke and dehiscence requiring replacement.  From a cardiac standpoint, he has done well.  His significant other and the patient asked recently if we could consider decreasing his diuretics.  I decreased Bumex to 0.5 mg a day and  decreased potassium to 1 tablet or 20 mEq daily.  He returns today in followup with his weight stable and his basic metabolic panel stable.  His symptoms are the same.  He has no complaints of shortness of breath, PND, orthopnea, syncope or near-syncope.  His creatinine has worsened since the last visit; however, he had multiple doses of antibiotics with his oral surgery.  He plans to see Dr. Dipak Gordillo in 2 weeks for a followup and then will return to see me in 1 month's time.  I have placed the Bumex and the potassium on hold and asked the significant other and the patient to call if he becomes more short of breath or develops swelling.  My plan will be to return the Bumex back to 0.5 mg daily and potassium 1 tablet a day.  He will return in 1 month's time for reassessment.         BIBI MARCIAL, CNP             D: 2018   T: 2018   MT: MERCEDEZ      Name:     ASHTYN STROUD   MRN:      [id number]        Account:      [id number]   :      1955           Service Date: 2018      Document: [id number]           Outpatient Encounter Prescriptions as of 2018   Medication Sig Dispense Refill     albuterol (PROAIR HFA/PROVENTIL HFA/VENTOLIN HFA) 108 (90 Base) MCG/ACT inhaler Inhale 2 puffs into the lungs every 6 hours as needed for shortness of breath / dyspnea or wheezing 1 Inhaler 3     aspirin 81 MG chewable tablet 1 tablet (81 mg) by Oral or Feeding Tube route daily 60 tablet 1     atorvastatin (LIPITOR) 40 MG tablet Take 1 tablet (40 mg) by mouth daily 60 tablet 1     bumetanide (BUMEX) 1 MG tablet hold       Carboxymethylcellulose Sod PF (REFRESH PLUS) 0.5 % SOLN ophthalmic solution Place 1 drop Into the left eye 3 times daily as needed (to flush debris from eye) 1 Bottle 1     carvedilol (COREG) 12.5 MG tablet Take 0.5 tablets (6.25 mg) by mouth 2 times daily 180 tablet 1     famotidine (PEPCID) 20 MG tablet Take 1 tablet (20 mg) by mouth daily 90 tablet 1     Ferrous Sulfate  (IRON) 325 (65 FE) MG tablet Take 1 tablet by mouth every evening       FLUoxetine (PROZAC) 20 MG capsule Take 1 capsule (20 mg) by mouth daily 90 capsule 3     losartan (COZAAR) 50 MG tablet TAKE 1/2 TABLET BY MOUTH EVERY 12 HOURS 90 tablet 3     mirtazapine (REMERON) 30 MG tablet Take 1 tablet (30 mg) by mouth At Bedtime 90 tablet 3     multivitamin, therapeutic with minerals (THERA-VIT-M) TABS tablet Take 1 tablet by mouth daily 30 each 0     potassium chloride SA (KLOR-CON) 20 MEQ CR tablet hold  0     [DISCONTINUED] ampicillin (OMNIPEN) 2 g vial Inject 2 g into the vein once for 1 dose - Intravenous 1 each 0     [DISCONTINUED] bumetanide (BUMEX) 1 MG tablet 1/2 tab daily or as directed 90 tablet 3     [DISCONTINUED] potassium chloride SA (KLOR-CON) 20 MEQ CR tablet Take 1 tablet (20 mEq) by mouth daily 30 tablet 3     [DISCONTINUED] vancomycin 2,000 mg Inject 2,000 mg into the vein every 12 hours 1 each 1     No facility-administered encounter medications on file as of 9/26/2018.        Again, thank you for allowing me to participate in the care of your patient.      Sincerely,    BIBI Faust [facility]

## 2018-09-26 NOTE — TELEPHONE ENCOUNTER
Name of caller: Meghna  Relationship of Patient: Wife    Reason for Call: PT needs appt with Dr. Gordillo to discuss his kidneys and to get labs as well. They need to be seen around 10/11.     Best phone number to reach pt at is: [phone]  Ok to leave a message with medical info? Yes    Pharmacy preferred (if calling for a refill): [facility]  Patient Representative - [facility]

## 2018-09-26 NOTE — PATIENT INSTRUCTIONS
Call [facility] nurse for any questions or concerns:  [phone]   *If you have concerns after hours, please call [phone], option 2 to speak with on call Cardiologist.    1. Medication changes from today:  HOLD bumex and potassium.     2. Weigh yourself daily and write it down.     3. Call [facility] nurse if your weight is up more than 2 pounds in one day or 5 pounds in one week.     4. Call [facility] nurse if you feel more short of breath, have more abdominal bloating, or leg swelling.     5. Continue low sodium diet (less than 2000 mg daily). If you eat less salt, you will retain less fluid.     6. Alcohol can weaken your heart further. You should avoid alcohol or limit its use to special times, such as a holiday or birthday.      7. Do NOT take Aleve or ibuprofen without talking to your doctor first.      8. Lab Results:      Component      Latest Ref Rng & Units 8/24/2018 9/26/2018   Sodium      136 - 145 mmol/L 143 140   Potassium      3.5 - 5.1 mmol/L 4.2 4.3   Chloride      98 - 107 mmol/L 107 102   Carbon Dioxide      23 - 29 mmol/L 28 26   Anion Gap      6 - 17 mmol/L 8 16.3   Glucose      70 - 105 mg/dL 122 (H) 122 (H)   Urea Nitrogen      7 - 30 mg/dL 24 20   Creatinine      0.70 - 1.30 mg/dL 1.12 1.55 (H)   GFR Estimate      >60 mL/min/1.7m2 66 45 (L)   GFR Estimate If Black      >60 mL/min/1.7m2 80 55 (L)   Calcium      8.5 - 10.5 mg/dL 9.3 9.5   Bilirubin Total      0.2 - 1.3 mg/dL 0.4    Albumin      3.4 - 5.0 g/dL 3.5    Protein Total      6.8 - 8.8 g/dL 7.6    Alkaline Phosphatase      40 - 150 U/L 126    ALT      0 - 70 U/L 17    AST      0 - 45 U/L 14        9. See Dr. Gordillo in 2 weeks.       [facility]: Cardiomyopathy, Optimization, Rehabilitation, Education  The [facility] is a heart failure specialty clinic within the [facility] where you will work with specialized nurse practitioners, physician assistants, doctors, and registered nurses. They are dedicated to helping patients with heart  failure to carefully adjust medications, receive education, and learn who and when to call if symptoms develop. They specialize in helping you better understand your condition, slow the progression of your disease, improve the length and quality of your life, help you detect future heart problems before they become life threatening, and avoid hospitalizations.

## 2018-09-26 NOTE — MR AVS SNAPSHOT
After Visit Summary   9/26/2018    Cricket Miguel    MRN: [id number]           Patient Information     Date Of Birth          1955        Visit Information        Provider Department      9/26/2018 1:50 PM Candice Olivera APRN CNP [facility]        Today's Diagnoses     Chronic systolic congestive heart failure (H)        Hypokalemia          Care Instructions    Call [facility] nurse for any questions or concerns:  [phone]   *If you have concerns after hours, please call [phone], option 2 to speak with on call Cardiologist.    1. Medication changes from today:  HOLD bumex and potassium.     2. Weigh yourself daily and write it down.     3. Call CORE nurse if your weight is up more than 2 pounds in one day or 5 pounds in one week.     4. Call CORE nurse if you feel more short of breath, have more abdominal bloating, or leg swelling.     5. Continue low sodium diet (less than 2000 mg daily). If you eat less salt, you will retain less fluid.     6. Alcohol can weaken your heart further. You should avoid alcohol or limit its use to special times, such as a holiday or birthday.      7. Do NOT take Aleve or ibuprofen without talking to your doctor first.      8. Lab Results:      Component      Latest Ref Rng & Units 8/24/2018 9/26/2018   Sodium      136 - 145 mmol/L 143 140   Potassium      3.5 - 5.1 mmol/L 4.2 4.3   Chloride      98 - 107 mmol/L 107 102   Carbon Dioxide      23 - 29 mmol/L 28 26   Anion Gap      6 - 17 mmol/L 8 16.3   Glucose      70 - 105 mg/dL 122 (H) 122 (H)   Urea Nitrogen      7 - 30 mg/dL 24 20   Creatinine      0.70 - 1.30 mg/dL 1.12 1.55 (H)   GFR Estimate      >60 mL/min/1.7m2 66 45 (L)   GFR Estimate If Black      >60 mL/min/1.7m2 80 55 (L)   Calcium      8.5 - 10.5 mg/dL 9.3 9.5   Bilirubin Total      0.2 - 1.3 mg/dL 0.4    Albumin      3.4 - 5.0 g/dL 3.5    Protein Total      6.8 - 8.8 g/dL 7.6    Alkaline Phosphatase       40 - 150 U/L 126    ALT      0 - 70 U/L 17    AST      0 - 45 U/L 14        9. See Dr. Gordillo in 2 weeks.       [facility]: Cardiomyopathy, Optimization, Rehabilitation, Education  The [facility] is a heart failure specialty clinic within the [facility] where you will work with specialized nurse practitioners, physician assistants, doctors, and registered nurses. They are dedicated to helping patients with heart failure to carefully adjust medications, receive education, and learn who and when to call if symptoms develop. They specialize in helping you better understand your condition, slow the progression of your disease, improve the length and quality of your life, help you detect future heart problems before they become life threatening, and avoid hospitalizations.              Follow-ups after your visit        Additional Services     [facility]                 Your next 10 appointments already scheduled     Dec 04, 2018 12:00 PM CST   Office Visit with Dipak Gordillo MD   [facility]    [address] [place] [id number] [phone]           Bring a current list of meds and any records pertaining to this visit. For Physicals, please bring immunization records and any forms needing to be filled out. Please arrive 10 minutes early to complete paperwork.              Future tests that were ordered for you today     Open Future Orders        Priority Expected Expires Ordered    Basic metabolic panel Routine 10/24/2018 9/26/2019 9/26/2018    [facility] Routine 10/24/2018 9/26/2019 9/26/2018            Who to contact     If you have questions or need follow up information about today's clinic visit or your schedule please contact [facility] directly at [phone].  Normal or non-critical lab and imaging results will be communicated to you by MyChart, letter or phone within 4 business days after  "the clinic has received the results. If you do not hear from us within 7 days, please contact the clinic through [employer] or phone. If you have a critical or abnormal lab result, we will notify you by phone as soon as possible.  Submit refill requests through [employer] or call your pharmacy and they will forward the refill request to us. Please allow 3 business days for your refill to be completed.          Additional Information About Your Visit        [employer] Information     [employer] gives you secure access to your electronic health record. If you see a primary care provider, you can also send messages to your care team and make appointments. If you have questions, please call your primary care clinic.  If you do not have a primary care provider, please call [phone] and they will assist you.        Care EveryWhere ID     This is your Care EveryWhere ID. This could be used by other organizations to access your [facility] medical records  [id number]        Your Vitals Were     Pulse Height Pulse Oximetry BMI (Body Mass Index)          58 1.702 m (5' 7\") 96% 28.3 kg/m2         Blood Pressure from Last 3 Encounters:   09/26/18 112/62   09/10/18 126/87   09/04/18 120/68    Weight from Last 3 Encounters:   09/26/18 82 kg (180 lb 11.2 oz)   09/04/18 80.7 kg (178 lb)   08/22/18 80.9 kg (178 lb 4.8 oz)              We Performed the Following     Follow-[facility] with [facility]          Today's Medication Changes          These changes are accurate as of 9/26/18  2:30 PM.  If you have any questions, ask your nurse or doctor.               These medicines have changed or have updated prescriptions.        Dose/Directions    bumetanide 1 MG tablet   Commonly known as:  BUMEX   This may have changed:  additional instructions   Used for:  Chronic systolic congestive heart failure (H)   Changed by:  Candice Olivera APRN CNP        hold   Refills:  0       KLOR-CON 20 MEQ CR tablet   This may have changed:    - how much to " take  - how to take this  - when to take this  - additional instructions   Used for:  Hypokalemia   Generic drug:  potassium chloride SA   Changed by:  Candice Olivera APRN CNP        hold   Refills:  0                Primary Care Provider Office Phone # Fax #    Dipak Gordillo [phone] [address] [facility] [address]        Equal Access to Services     [facility]: Hadii aad ku hadasho Soomaali, waaxda luqadaha, qaybta kaalmada adeegyada, waxay idiin hayaan adeeg kharash la'aan ah. So [facility] [phone].    ATENCIÓN: Si habla español, tiene a mendiola disposición servicios gratuitos de asistencia lingüística. Llame al [phone].    We comply with applicable federal civil rights laws and [place] laws. We do not discriminate on the basis of race, color, national origin, age, disability, sex, sexual orientation, or gender identity.            Thank you!     Thank you for choosing [facility]  for your care. Our goal is always to provide you with excellent care. Hearing back from our patients is one way we can continue to improve our services. Please take a few minutes to complete the written survey that you may receive in the mail after your visit with us. Thank you!             Your Updated Medication List - Protect others around you: Learn how to safely use, store and throw away your medicines at www.disposemymeds.org.          This list is accurate as of 9/26/18  2:30 PM.  Always use your most recent med list.                   Brand Name Dispense Instructions for use Diagnosis    albuterol 108 (90 Base) MCG/ACT inhaler    PROAIR HFA/PROVENTIL HFA/VENTOLIN HFA    1 Inhaler    Inhale 2 puffs into the lungs every 6 hours as needed for shortness of breath / dyspnea or wheezing    Upper respiratory tract infection, unspecified type       aspirin 81 MG chewable tablet     60 tablet    1 tablet (81 mg) by Oral or Feeding Tube route daily     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, Cerebrovascular accident (CVA) due to other mechanism (H)       atorvastatin 40 MG tablet    LIPITOR    60 tablet    Take 1 tablet (40 mg) by mouth daily    Cerebrovascular accident (CVA) due to other mechanism (H)       bumetanide 1 MG tablet    BUMEX     hold    Chronic systolic congestive heart failure (H)       Carboxymethylcellulose Sod PF 0.5 % Soln ophthalmic solution    REFRESH PLUS    1 Bottle    Place 1 drop Into the left eye 3 times daily as needed (to flush debris from eye)    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       carvedilol 12.5 MG tablet    COREG    180 tablet    Take 0.5 tablets (6.25 mg) by mouth 2 times daily    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, Hypertension goal BP (blood pressure) < 140/90       famotidine 20 MG tablet    PEPCID    90 tablet    Take 1 tablet (20 mg) by mouth daily    Medication monitoring encounter       FLUoxetine 20 MG capsule    PROzac    90 capsule    Take 1 capsule (20 mg) by mouth daily    Major depressive disorder, single episode, moderate (H), Anxiety       iron 325 (65 Fe) MG tablet      Take 1 tablet by mouth every evening        KLOR-CON 20 MEQ CR tablet   Generic drug:  potassium chloride SA      hold    Hypokalemia       losartan 50 MG tablet    COZAAR    90 tablet    TAKE 1/2 TABLET BY MOUTH EVERY 12 HOURS    Hypertension goal BP (blood pressure) < 140/90, Congestive heart failure, NYHA class 2, unspecified congestive heart failure type (H), Cardiomyopathy, unspecified type (H)       mirtazapine 30 MG tablet    REMERON    90 tablet    Take 1 tablet (30 mg) by mouth At Bedtime    Mild single current episode of major depressive disorder (H), Anxiety       multivitamin, therapeutic with minerals Tabs tablet     30 each    Take 1 tablet by mouth daily    Cerebrovascular accident (CVA) due to other mechanism (H)

## 2018-09-26 NOTE — LETTER
9/26/2018    Dipak Gordillo MD  [address] [address]    RE: Cricket Miguel       Dear Colleague,    I had the pleasure of seeing Cricket Miguel in the [facility].    HPI and Plan:   See [id number]    Orders Placed This Encounter   Procedures     Basic metabolic panel     [facility]       Orders Placed This Encounter   Medications     bumetanide (BUMEX) 1 MG tablet     Sig: hold     potassium chloride SA (KLOR-CON) 20 MEQ CR tablet     Sig: hold     Refill:  0       Medications Discontinued During This Encounter   Medication Reason     vancomycin 2,000 mg Stopped by Patient     ampicillin (OMNIPEN) 2 g vial Stopped by Patient     bumetanide (BUMEX) 1 MG tablet Reorder     potassium chloride SA (KLOR-CON) 20 MEQ CR tablet Reorder         Encounter Diagnoses   Name Primary?     Chronic systolic congestive heart failure (H)      Hypokalemia        CURRENT MEDICATIONS:  Current Outpatient Prescriptions   Medication Sig Dispense Refill     albuterol (PROAIR HFA/PROVENTIL HFA/VENTOLIN HFA) 108 (90 Base) MCG/ACT inhaler Inhale 2 puffs into the lungs every 6 hours as needed for shortness of breath / dyspnea or wheezing 1 Inhaler 3     aspirin 81 MG chewable tablet 1 tablet (81 mg) by Oral or Feeding Tube route daily 60 tablet 1     atorvastatin (LIPITOR) 40 MG tablet Take 1 tablet (40 mg) by mouth daily 60 tablet 1     bumetanide (BUMEX) 1 MG tablet hold       Carboxymethylcellulose Sod PF (REFRESH PLUS) 0.5 % SOLN ophthalmic solution Place 1 drop Into the left eye 3 times daily as needed (to flush debris from eye) 1 Bottle 1     carvedilol (COREG) 12.5 MG tablet Take 0.5 tablets (6.25 mg) by mouth 2 times daily 180 tablet 1     famotidine (PEPCID) 20 MG tablet Take 1 tablet (20 mg) by mouth daily 90 tablet 1     Ferrous Sulfate (IRON) 325 (65 FE) MG tablet Take 1 tablet by mouth every evening       FLUoxetine (PROZAC) 20 MG capsule Take 1 capsule (20 mg) by mouth  daily 90 capsule 3     losartan (COZAAR) 50 MG tablet TAKE 1/2 TABLET BY MOUTH EVERY 12 HOURS 90 tablet 3     mirtazapine (REMERON) 30 MG tablet Take 1 tablet (30 mg) by mouth At Bedtime 90 tablet 3     multivitamin, therapeutic with minerals (THERA-VIT-M) TABS tablet Take 1 tablet by mouth daily 30 each 0     potassium chloride SA (KLOR-CON) 20 MEQ CR tablet hold  0     [DISCONTINUED] bumetanide (BUMEX) 1 MG tablet 1/2 tab daily or as directed 90 tablet 3       ALLERGIES     Allergies   Allergen Reactions     Baclofen Nausea and Vomiting     Loss of bladder control     Lisinopril Swelling     One-sided facial swelling after being on lisinopril/HCTZ for one week.        PAST MEDICAL HISTORY:  Past Medical History:   Diagnosis Date     Abscess of aortic root 10/06/2017     AI (aortic insufficiency)     severe     Anxiety      Aortic root dilatation (H)      AR (aortic regurgitation)     severe     Cardiomyopathy (H)      CHF (congestive heart failure), NYHA class II (H)      Constipation     Dr Mcghee     COPD, mild (H)     spirometry 12/16     CVA (cerebral vascular accident) (H) 10/06/2017    septic emboli - MSSA - cerebellum, Left MCA, Rt Occipital, Aphasia, Left facial paralysis, Right>Left UE/LE weakness, Left visual field cut, moderate to severe encephalopathy, cognitive dysfunction, word finding     Depression 10/06/2017     Elevated PSA 05/2018    PSA = 8, Dr Garcia     Heart murmur      Hyperlipidemia LDL goal <70 10/31/2010     Hypertension goal BP (blood pressure) < 140/90      Inguinal hernia      left lung nodule  1/29/2008     Major depressive disorder, single episode, moderate (H)      Psoriasis childhood     SNHL (sensorineural hearing loss)     Left, secondary to CVA     Tobacco use disorder        PAST SURGICAL HISTORY:  Past Surgical History:   Procedure Laterality Date     ARTHROSCOPY KNEE INCISION AND DRAINAGE Left 10/8/2017    Arthroscopic Incision and Drainage of Left Knee - Dr Munoz     [facility]  SHOULDER ARTHROSCOPY, DX  2001    Arthroscopy, Shoulder RT Dr OSIRIS Andersen     HC SHOULDER ARTHROSCOPY, DX  1/04    LT arthroscopy Dr OSIRIS Andersen     HERNIA REPAIR, UMBILICAL  1/08    incarcerated - dr rockwell     HERNIORRHAPHY INGUINAL  12/21/2012    HERNIORRHAPHY INGUINAL;  Right Inguinal Hernia Repair with mesh ;  Surgeon: Mello Rockwell MD;  Location: [facility] OR     REPAIR ANEURYSM ASCENDING AORTA N/A 12/19/2017    REPAIR ANEURYSM ASCENDING AORTA;  REDO Median STERNOTOMY, FEMORAL CANNULATION, Lysis of adhesions, AORTIC ROOT VALVE HOMOGRAFT with 26mm x 7.0cm Cryolife Aortic valve and conduit - Dr Boewrs     REPLACE VALVE AORTIC N/A 5/17/2016    REPLACE VALVE AORTIC - Dr Bowers     ROTATOR CUFF REPAIR RT/LT  11/10    Rt arthroscopy - Dr OSIRIS Andersen     SURGICAL HISTORY OF -   5/16    AVR, severe AR, aortic root repair     TRACHEOSTOMY PERCUTANEOUS  10/27/2017            FAMILY HISTORY:  Family History   Problem Relation Age of Onset     Genetic Disorder Mother      Bone plateover growth Agustin. shoulder surgeries     Cancer Mother      brain cancer     Other Cancer Mother      Alzheimer Disease Father        SOCIAL HISTORY:  Social History     Social History     Marital status:      Spouse name: N/A     Number of children: 3     Years of education: 12     Social History Main Topics     Smoking status: Former Smoker     Packs/day: 1.00     Years: 35.00     Types: Cigarettes     Quit date: 4/1/2016     Smokeless tobacco: Never Used      Comment: 4/2016     Alcohol use 0.0 oz/week      Comment: 1 drink per month,maybe     Drug use: Yes     Special: Marijuana      Comment: marijuana- daily previously     Sexual activity: Yes     Partners: Female     Other Topics Concern     Caffeine Concern Yes     3 cups     Sleep Concern No     Special Diet No     trying to watch salt     Exercise Yes     walking     Seat Belt No     Parent/Sibling W/ Cabg, Mi Or Angioplasty Before 65f 55m? No     Social History Narrative       Review  "of Systems:  Skin:  Positive for hair changes rash on behind, loosing lots of hair    Eyes:  Positive for glasses left eye is bad   ENT:  Positive for hearing loss def in left ear   Respiratory:  Positive for cough;mucoid expectorant occas. Cough.  Clearing up   Cardiovascular:    Positive for Rehab for stroke  Gastroenterology: Positive for nausea Getting better  Genitourinary:  Positive for urinary frequency has to really concentrate to urinate   Musculoskeletal:  Positive for joint stiffness gets sore behind   Neurologic:  Positive for paralysis;stroke right sided weakness, left side facial droop  Psychiatric:  Positive for excessive stress;anxiety;depression    Heme/Lymph/Imm:  Negative      Endocrine:  Negative        Physical Exam:  Vitals: /62  Pulse 58  Ht 1.702 m (5' 7\")  Wt 82 kg (180 lb 11.2 oz)  SpO2 96%  BMI 28.3 kg/m2    Constitutional:  cooperative chronically ill      Skin:  warm and dry to the touch surgical scars well-healed        Head:           Eyes:      left lid lag and facial droop    Lymph:      ENT:  no pallor or cyanosis        Neck:  JVP normal        Respiratory:  normal breath sounds, clear to auscultation, normal A-P diameter, normal symmetry, normal respiratory excursion, no use of accessory muscles         Cardiac: regular rhythm                pulses full and equal                                        GI:           Extremities and Muscular Skeletal:  no edema              Neurological:      left facial droop, right hemiparesis.    Psych:  Alert and Oriented x 3        CC  BIBI Bettencourt CNP  [address]  [place] [address]                Thank you for allowing me to participate in the care of your patient.      Sincerely,     BIBI Faust CNP     [facility]    cc:   BIBI Bettencourt CNP  [address]  [address]  [place] [address]        "

## 2018-09-26 NOTE — NURSING NOTE
The After Visit Summary was reviewed with the patient following their office visit with Candice Olivear NP. Patient was educated about any medication changes, the importance of following a low sodium diet, importance of recording daily weights, and when to call [facility] clinic. Patient verbalized understanding of the information and agrees to call with any questions or concerns.     Labs: Lab results from today were reviewed with the patient during the office visit. A copy of the results were provided on the After Visit Summary.     Return appointment: Patient was given instructions on when and how to schedule their next office visit with the [facility] clinic. See PCP, Dr. Gordillo, in 2 weeks and Candice Olivera again in 1 month with labs.    Medication changes: Hold Bumex and KCL. Told wife not to throw bottles away, set them aside, as pt may need them again in the future.       Yarely Carranza, RN  [facility] RN Care Coordinator  [facility]  [phone]

## 2018-09-27 NOTE — TELEPHONE ENCOUNTER
Please put in orders for kidney function/creatine, then they can be reviewed in the 10/8 visit with Dr Duy Terrell, Clinic Receptionist

## 2018-09-27 NOTE — PROGRESS NOTES
Service Date: 09/26/2018      HISTORY OF PRESENT ILLNESS:     I had the pleasure of seeing Mr. 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  He had dental work done in the meantime, which may have been the source of the infection.  His cardiomyopathy with decreased LV function improved over time.  His echocardiogram 04/2018 showed improved LVEF closer to 50%.      In May of this year, he presented to the emergency room and diagnosed with C. difficile and discharged on antibiotics.      He follows with Dr. Dipak Gordillo for his general care.  His weight is stable at 178 pounds.      The patient recently followed up at [facility] requesting consideration of weaning the diuretic and the potassium.  He has had no signs or symptoms of congestive heart failure.  I decreased Bumex in half and decreased the potassium tablet.  He returns today in followup.  He recently had significant dental work on multiple antibiotics.  He states he is feeling well.  No complaints of increased shortness of breath, orthopnea, PND, syncope or near-syncope.  His weight is stable at 178-180 pounds on a chair scale.      DIAGNOSTICS:   Sodium 140, potassium 4.3, BUN 20, creatinine 1.55 up from 1.12, GFR 45.      IMPRESSION AND PLAN:   1.  History of infective endocarditis, status post aortic valve and ascending aortic replacement, subsequent stroke and dehiscence requiring replacement.  From a cardiac standpoint, he has done well.  His significant other and the patient asked recently if we could consider decreasing his diuretics.  I decreased Bumex to 0.5 mg a day and decreased potassium to 1 tablet or 20 mEq daily.  He returns today in followup with his weight stable and his basic metabolic panel stable.  His symptoms are the same.  He has no complaints of shortness of breath, PND, orthopnea,  syncope or near-syncope.  His creatinine has worsened since the last visit; however, he had multiple doses of antibiotics with his oral surgery.  He plans to see Dr. Dipak Gordillo in 2 weeks for a followup and then will return to see me in 1 month's time.  I have placed the Bumex and the potassium on hold and asked the significant other and the patient to call if he becomes more short of breath or develops swelling.  My plan will be to return the Bumex back to 0.5 mg daily and potassium 1 tablet a day.  He will return in 1 month's time for reassessment.         BIBI MARCIAL, CNP             D: 2018   T: 2018   MT: MERCEDEZ      Name:     ASHTYN STROUD   MRN:      [id number]        Account:      [id number]   :      1955           Service Date: 2018      Document: [id number]

## 2018-09-28 NOTE — TELEPHONE ENCOUNTER
There is a future BMP order in chart but is for cardiology and is to be rechecked around 10/11/18 per Meghna.  Per last labs from  on 8/24/2018 he needs to have fasting glucose and A1c. Orders placed.      Patient scheduled for lab only on 10/4 to check fasting glucose and A1c.  Lab only scheduled for 10/11 to recheck BMP per cardiology.    Meghna verbalized understanding and agreed with plan.      RANJEET Crow, RN, PHN  [facility]) [phone]

## 2018-10-08 PROBLEM — Z86.73 HISTORY OF CVA (CEREBROVASCULAR ACCIDENT): Status: ACTIVE | Noted: 2018-01-01

## 2018-10-08 PROBLEM — Z86.79 HISTORY OF ENDOCARDITIS: Status: ACTIVE | Noted: 2018-01-01

## 2018-10-08 NOTE — LETTER
[facility] ([facility] - [address] [place] [address]                                                                                                       [phone]    October 8, 2018    Cricket Miguel  [address]  [facility] [address]      To Whom it May Concern:    Giovanni has had extensive medical cares in the last 1 year related to endocarditis with secondary CVA, with residual left facial droop and residual right sided weakness. This had made considerable dental issues.    Please contact me with questions or concerns.      Sincerely,        Dipak Gordillo M.D.

## 2018-10-08 NOTE — PROGRESS NOTES
SUBJECTIVE:   Cricket Miguel is a 63 year old male who presents to clinic today for the following health issues:    Recheck     Kidneys: Patient presented today due to decreased kidney function discovered by labs performed on 9/26. His labs have returned to baseline. Patient received Ampicillin and Vancomycin infusion on 9/10 at infusion center. Kidney issues believed to be caused by Vancomycin.    Lab Results   Component Value Date    CR 1.19 10/04/2018     CHF: Patient saw Candice Chandler of Cardiology on 9/26. Patient had labs ordered then. Patient's kidney function was down and was referred to PCP.    Hypertension: Patient presents today as normotensive. Patient is currently prescribed 6.25 mg Carvedilol daily and 100 mg Losartan daily for hypertension management.    BP Readings from Last 3 Encounters:   10/08/18 130/84   09/26/18 112/62   09/10/18 126/87     Hyperlipidemia: Patient is currently prescribed 40 mg Atorvastatin daily for hyperlipidemia management.    Recent Labs   Lab Test  08/24/18   1035  04/25/18   0941   02/07/12   1214   CHOL  115  107   < >  198   HDL  45  39*   < >  47   LDL  47  41   < >  123   TRIG  116  133   < >  138   CHOLHDLRATIO   --    --    --   4.2    < > = values in this interval not displayed.     Depression/Anxiety: Stable. Patient is currently prescribed 22 mg Mirtazapine daily and 20 mg Fluoxetine daily for depression/anxiety management.    Patient's wife requesting note saying that dental implants were necessary and that dentures would not have worked for the patient.    Problem list and histories reviewed & adjusted, as indicated.  Additional history: as documented    body mass index is 27.1 kg/(m^2).    Wt Readings from Last 4 Encounters:   10/08/18 173 lb (78.5 kg)   09/26/18 180 lb 11.2 oz (82 kg)   09/04/18 178 lb (80.7 kg)   08/22/18 178 lb 4.8 oz (80.9 kg)       Health Maintenance    Health Maintenance Due   Topic Date Due     HF ACTION PLAN Q3 YR  1955      COPD ACTION PLAN Q1 YR  1955     MICROALBUMIN Q1 YEAR  02/07/2013     PHQ-9 Q6 MONTHS  11/15/2018       Current Problem List    Patient Active Problem List   Diagnosis     Tobacco use disorder     Hypertension goal BP (blood pressure) < 140/90     Disease of lung     Major depressive disorder, single episode, moderate (HCC)     Advance Care Planning     Psoriasis     COPD, mild (H)     CHF (congestive heart failure), NYHA class II (H)     AR (aortic regurgitation)     AI (aortic insufficiency)     Aortic root dilatation (H)     Hyperlipidemia LDL goal <70     Health Care Home     Status post coronary angiogram     Cardiomyopathy (H)     S/P AVR (aortic valve replacement)     H/O aortic root repair     Anemia     Endocarditis     Encephalopathy     Abscess of aortic root     CVA (cerebral vascular accident) (H)     Anxiety     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     SNHL (sensorineural hearing loss)     Hx of aortic root repair     Prophylactic antibiotic     Elevated PSA     Constipation     History of CVA (cerebrovascular accident)     History of endocarditis       Past Medical History    Past Medical History:   Diagnosis Date     Abscess of aortic root 10/06/2017     AI (aortic insufficiency)     severe     Anxiety      Aortic root dilatation (H)      AR (aortic regurgitation)     severe     Cardiomyopathy (H)      CHF (congestive heart failure), NYHA class II (H)      Constipation     Dr Mcghee     COPD, mild (H)     spirometry 12/16     CVA (cerebral vascular accident) (H) 10/06/2017    septic emboli - MSSA - cerebellum, Left MCA, Rt Occipital, Aphasia, Left facial paralysis, Right>Left UE/LE weakness, Left visual field cut, moderate to severe encephalopathy, cognitive dysfunction, word finding     Depression 10/06/2017     Elevated PSA 05/2018    PSA = 8, Dr Garcia     Heart murmur      Hyperlipidemia LDL goal <70 10/31/2010     Hypertension goal BP (blood pressure) <  140/90      Inguinal hernia      left lung nodule  1/29/2008     Major depressive disorder, single episode, moderate (H)      Psoriasis childhood     SNHL (sensorineural hearing loss)     Left, secondary to CVA     Tobacco use disorder        Past Surgical History    Past Surgical History:   Procedure Laterality Date     ARTHROSCOPY KNEE INCISION AND DRAINAGE Left 10/8/2017    Arthroscopic Incision and Drainage of Left Knee - Dr Munoz     HC SHOULDER ARTHROSCOPY, DX  2001    Arthroscopy, Shoulder RT Dr OSIRIS Andersen     HC SHOULDER ARTHROSCOPY, DX  1/04    LT arthroscopy Dr OSIRIS Andersen     HERNIA REPAIR, UMBILICAL  1/08    incarcerated - dr rockwell     HERNIORRHAPHY INGUINAL  12/21/2012    HERNIORRHAPHY INGUINAL;  Right Inguinal Hernia Repair with mesh ;  Surgeon: Mello Rockwell MD;  Location: [facility] OR     REPAIR ANEURYSM ASCENDING AORTA N/A 12/19/2017    REPAIR ANEURYSM ASCENDING AORTA;  REDO Median STERNOTOMY, FEMORAL CANNULATION, Lysis of adhesions, AORTIC ROOT VALVE HOMOGRAFT with 26mm x 7.0cm Cryolife Aortic valve and conduit - Dr Bowers     REPLACE VALVE AORTIC N/A 5/17/2016    REPLACE VALVE AORTIC - Dr Bowers     ROTATOR CUFF REPAIR RT/LT  11/10    Rt arthroscopy - Dr OSIRIS Andersen     SURGICAL HISTORY OF -   5/16    AVR, severe AR, aortic root repair     TRACHEOSTOMY PERCUTANEOUS  10/27/2017            Current Medications    Current Outpatient Prescriptions   Medication Sig Dispense Refill     albuterol (PROAIR HFA/PROVENTIL HFA/VENTOLIN HFA) 108 (90 Base) MCG/ACT inhaler Inhale 2 puffs into the lungs every 6 hours as needed for shortness of breath / dyspnea or wheezing 1 Inhaler 3     aspirin 81 MG chewable tablet 1 tablet (81 mg) by Oral or Feeding Tube route daily 60 tablet 1     atorvastatin (LIPITOR) 40 MG tablet Take 1 tablet (40 mg) by mouth daily 60 tablet 1     Carboxymethylcellulose Sod PF (REFRESH PLUS) 0.5 % SOLN ophthalmic solution Place 1 drop Into the left eye 3 times daily as needed (to flush  debris from eye) 1 Bottle 1     carvedilol (COREG) 12.5 MG tablet Take 0.5 tablets (6.25 mg) by mouth 2 times daily 180 tablet 1     famotidine (PEPCID) 20 MG tablet Take 1 tablet (20 mg) by mouth daily 90 tablet 1     FLUoxetine (PROZAC) 20 MG capsule Take 1 capsule (20 mg) by mouth daily 90 capsule 3     losartan (COZAAR) 50 MG tablet TAKE 1/2 TABLET BY MOUTH EVERY 12 HOURS 90 tablet 3     mirtazapine (REMERON) 15 MG tablet Take 1.5 tablets (22.5 mg) by mouth At Bedtime       multivitamin, therapeutic with minerals (THERA-VIT-M) TABS tablet Take 1 tablet by mouth daily 30 each 0       Allergies    Allergies   Allergen Reactions     Baclofen Nausea and Vomiting     Loss of bladder control     Lisinopril Swelling     One-sided facial swelling after being on lisinopril/HCTZ for one week.        Immunizations    Immunization History   Administered Date(s) Administered     Influenza (High Dose) 3 valent vaccine 09/04/2018     Influenza Vaccine IM 3yrs+ 4 Valent IIV4 10/28/2017     Pneumococcal 23 valent 11/04/2017     TD (ADULT, 7+) 04/14/1999     TDAP Vaccine (Adacel) 10/08/2018       Family History    Family History   Problem Relation Age of Onset     Genetic Disorder Mother      Bone plateover growth Agustin. shoulder surgeries     Cancer Mother      brain cancer     Other Cancer Mother      Alzheimer Disease Father        Social History    Social History     Social History     Marital status:      Spouse name: N/A     Number of children: 3     Years of education: 12     Occupational History     Not on file.     Social History Main Topics     Smoking status: Former Smoker     Packs/day: 1.00     Years: 35.00     Types: Cigarettes     Quit date: 4/1/2016     Smokeless tobacco: Never Used      Comment: 4/2016     Alcohol use 0.0 oz/week      Comment: 1 drink per month,maybe     Drug use: Yes     Special: Marijuana      Comment: marijuana- daily previously     Sexual activity: Yes     Partners: Female     Other  "Topics Concern     Caffeine Concern Yes     3 cups     Sleep Concern No     Special Diet No     trying to watch salt     Exercise Yes     walking     Seat Belt No     Parent/Sibling W/ Cabg, Mi Or Angioplasty Before 65f 55m? No     Social History Narrative       All above reviewed and updated, all stable unless otherwise noted    Recent labs reviewed    ROS:  Constitutional, HEENT, cardiovascular, pulmonary, GI, , musculoskeletal, neuro, skin, endocrine and psych systems are negative, except as otherwise noted.    OBJECTIVE:                                                    /84  Pulse 65  Temp 97.5  F (36.4  C) (Oral)  Ht 5' 7\" (1.702 m)  Wt 173 lb (78.5 kg)  SpO2 96%  BMI 27.1 kg/m2  Body mass index is 27.1 kg/(m^2).  GENERAL: healthy, alert and no distress  EYES: Eyes grossly normal to inspection  HENT:ear canals and TM's normal upon viewing with otoscope, nose and mouth without ulcers or lesions upon viewing with otoscope  RESP: lungs clear to auscultation - no rales, no rhonchi, no wheezes  CV: regular rates and rhythm, normal S1 S2, no S3 or S4 and no murmur, no click or rub -  ABDOMEN: soft, no tenderness, no  hepatosplenomegaly, no masses, normal bowel sounds  MS: extremities- no gross deformities noted, no edema, Left facial droop, residual right arm and leg weakness  SKIN: no suspicious lesions, no rashes  NEURO: strength and tone- normal, sensory exam- grossly normal, mentation- intact, speech- normal  BACK: no CVA tenderness, no paralumbar tenderness  PSYCH: Alert and oriented times 3; speech- coherent , normal rate and volume; able to articulate logical thoughts, able to abstract reason, no tangential thoughts, no hallucinations or delusions, affect- normal    DIAGNOSTICS/PROCEDURES:                                                      No results found for this or any previous visit (from the past 24 hour(s)).     ASSESSMENT/PLAN:                                                        " ICD-10-CM    1. History of CVA (cerebrovascular accident) Z86.73    2. History of endocarditis Z86.79    3. Abscess of aortic root I51.89    4. Hx of aortic root repair Z98.890    5. S/P AVR (aortic valve replacement) Z95.2    6. Cardiomyopathy, unspecified type (H) I42.9    7. Congestive heart failure, NYHA class 2, unspecified congestive heart failure type (H) I50.9    8. COPD, mild (H) J44.9    9. Major depressive disorder, single episode, moderate (HCC) F32.1    10. Anxiety F41.9 mirtazapine (REMERON) 15 MG tablet   11. Hypertension goal BP (blood pressure) < 140/90 I10    12. Hyperlipidemia LDL goal <70 E78.5    13. Medication monitoring encounter Z51.81    14. Need for Tdap vaccination Z23 TDAP, IM (10 - 64 YRS) - Adacel     ADMIN 1st VACCINE     Discussed treatment/modality options, including risk and benefits, he desires. All diagnosis above reviewed and noted above, otherwise stable.  See [facility] orders for further details.  Follow up as needed.    1) Kidney function has returned to baseline following discontinuation of Vancomycin.    2) Advised continued follow up with Candice Chandler of cardiology.    3) Patient presents today as normotensive. Patient is currently prescribed 6.25 mg Carvedilol daily and 100 mg Losartan daily for hypertension management. Advised continued use.    4) Patient is currently prescribed 40 mg Atorvastatin daily for hyperlipidemia management. Advised continued use.    5) Patient is currently prescribed 22 mg Mirtazapine daily and 20 mg Fluoxetine daily for depression/anxiety management. Advised continued use.    6) Patient has PT, OT, and speech therapy scheduled.    7) Patient received note today saying that he had a stroke secondary to endocarditis and that dental implants were necessary.    8) Patient received Tdap today. Follow up after 11/4/2018 for Prevnar 13 vaccine. Recommend Shingrix vaccine.    9) Follow up in 2 months for med check.    Health Maintenance Due   Topic  Date Due     HF ACTION PLAN Q3 YR  1955     COPD ACTION PLAN Q1 YR  1955     MICROALBUMIN Q1 YEAR  02/07/2013     PHQ-9 Q6 MONTHS  11/15/2018     This document serves as a record of the services and decisions personally performed and made by Dipak Gordillo MD. It was created on his behalf by Praful Rodriguez, a trained medical scribe. The creation of this document is based on the provider's statements to the medical scribe.  Praful Rodriguez October 8, 2018 9:15 AM     The information in this document, created by the medical scribe for me, accurately reflects the services I personally performed and the decisions made by me. I have reviewed and approved this document for accuracy prior to leaving the patient care area.  October 8, 2018            Dipak Gordillo MD [address]  [address] [phone] Fax

## 2018-10-08 NOTE — MR AVS SNAPSHOT
After Visit Summary   10/8/2018    Cricket Miguel    MRN: [id number]           Patient Information     Date Of Birth          1955        Visit Information        Provider Department      10/8/2018 9:00 AM Dipak Gordillo MD [facility]        Today's Diagnoses     History of CVA (cerebrovascular accident)    -  1    History of endocarditis        Abscess of aortic root        Hx of aortic root repair        S/P AVR (aortic valve replacement)        Cardiomyopathy, unspecified type (H)        Congestive heart failure, NYHA class 2, unspecified congestive heart failure type (H)        COPD, mild (H)        Major depressive disorder, single episode, moderate (HCC)        Anxiety        Hypertension goal BP (blood pressure) < 140/90        Hyperlipidemia LDL goal <70        Medication monitoring encounter        Need for Tdap vaccination          Care Instructions    Follow up for Prevnar 13 after 11/4/2018. Recommend Shingrix vaccine for shingles.    [facility]                        To reach your care team during and after hours:   [phone]  To reach our pharmacy:        [phone]    Clinic Hours                        Our clinic hours are:    Monday   7:30 am to 7:00 pm                  Tuesday through Friday 7:30 am to 5:00 pm                             Saturday   8:00 am to 12:00 pm      Sunday   Closed      Pharmacy Hours                        Our pharmacy hours are:    Monday   8:30 am to 7:00 pm       Tuesday to Friday  8:30 am to 6:00 pm                       Saturday    9:00 am to 1:00 pm              Sunday    Closed              There is also information available at our web site:  [email or web address]    If your provider ordered any lab tests and you do not receive the results within 10 business days, please call the clinic.    If you need a medication refill please contact your pharmacy.  Please allow 2-3 business days for your refill to be  completed.    Our clinic offers telephone visits and e visits.  Please ask one of your team members to explain more.      Use Linux Voicehart (secure email communication and access to your chart) to send your primary care provider a message or make an appointment. Ask someone on your Team how to sign up for [employer].  Immunizations                      Immunization History   Administered Date(s) Administered     Influenza (High Dose) 3 valent vaccine 09/04/2018     Influenza Vaccine IM 3yrs+ 4 Valent IIV4 10/28/2017     Pneumococcal 23 valent 11/04/2017     TD (ADULT, 7+) 04/14/1999        Health Maintenance                         Health Maintenance Due   Topic Date Due     Heart Failure Action Plan Reviewed Every 3 Years  1955     Wellness Visit with your Primary Provider - yearly  1955     COPD ACTION PLAN Q1 YR  1955     Colon Cancer Screening - FIT Test - yearly  03/16/1963     Microalbumin Lab - yearly  02/07/2013               Follow-ups after your visit        Follow-up notes from your care team     Return in about 2 months (around 12/8/2018), or if symptoms worsen or fail to improve, for Med Check.      Your next 10 appointments already scheduled     Oct 24, 2018  1:00 PM CDT   LAB with [facility]   [facility] ([facility])    [address] [place] [address] [address]  [facility] [id number] [phone]           Please do not eat 10-12 hours before your appointment if you are coming in fasting for labs on lipids, cholesterol, or glucose (sugar). This does not apply to pregnant women. Water, hot tea and black coffee (with nothing added) are okay. Do not drink other fluids, diet soda or chew gum.            Oct 24, 2018  1:50 PM CDT   Core Return with BIBI Bettencourt CNP   [facility] ([facility])    [facility] [facility] [address]  [facility] [id number] [phone] OPT 2            Dec 04, 2018 12:00 PM  "CST   Office Visit with Dipak Gordillo MD   [facility]    [address] [place] [id number] [phone]           Bring a current list of meds and any records pertaining to this visit. For Physicals, please bring immunization records and any forms needing to be filled out. Please arrive 10 minutes early to complete paperwork.              Who to contact     If you have questions or need follow up information about today's clinic visit or your schedule please contact [facility] directly at [phone].  Normal or non-critical lab and imaging results will be communicated to you by AdaptiveBluehart, letter or phone within 4 business days after the clinic has received the results. If you do not hear from us within 7 days, please contact the clinic through Rhomaniat or phone. If you have a critical or abnormal lab result, we will notify you by phone as soon as possible.  Submit refill requests through SIRION BIOTECH or call your pharmacy and they will forward the refill request to us. Please allow 3 business days for your refill to be completed.          Additional Information About Your Visit        MyChart Information     SIRION BIOTECH gives you secure access to your electronic health record. If you see a primary care provider, you can also send messages to your care team and make appointments. If you have questions, please call your primary care clinic.  If you do not have a primary care provider, please call [phone] and they will assist you.        Care EveryWhere ID     This is your Care EveryWhere ID. This could be used by other organizations to access your [facility] medical records  [id number]        Your Vitals Were     Pulse Temperature Height Pulse Oximetry BMI (Body Mass Index)       65 97.5  F (36.4  C) (Oral) 5' 7\" (1.702 m) 96% 27.1 kg/m2        Blood Pressure from Last 3 Encounters:   10/08/18 130/84   09/26/18 112/62   09/10/18 126/87    " Weight from Last 3 Encounters:   10/08/18 173 lb (78.5 kg)   09/26/18 180 lb 11.2 oz (82 kg)   09/04/18 178 lb (80.7 kg)              We Performed the Following     TDAP, IM (10 - 64 YRS) - Adacel          Today's Medication Changes          These changes are accurate as of 10/8/18 10:00 AM.  If you have any questions, ask your nurse or doctor.               These medicines have changed or have updated prescriptions.        Dose/Directions    mirtazapine 15 MG tablet   Commonly known as:  REMERON   This may have changed:    - medication strength  - how much to take   Used for:  Anxiety   Changed by:  Dipak Gordillo MD        Dose:  21.5 mg   Take 1.5 tablets (22.5 mg) by mouth At Bedtime   Refills:  0         Stop taking these medicines if you haven't already. Please contact your care team if you have questions.     bumetanide 1 MG tablet   Commonly known as:  BUMEX   Stopped by:  Dipak Gordillo MD           iron 325 (65 Fe) MG tablet   Stopped by:  Dipak Gordillo MD           KLOR-CON 20 MEQ CR tablet   Generic drug:  potassium chloride SA   Stopped by:  iDpak Gordillo MD                    Primary Care Provider Office Phone # Fax #    Dipak Gordillo [phone]       [address] [place] [address]        Equal Access to Services     OLIVERIO BILLS AH: Hadii jenise ku hadasho Soomaali, waaxda luqadaha, qaybta kaalmada adeegyada, waxay idiin hayaan ginette waggoner. So [facility] [phone].    ATENCIÓN: Si habla español, tiene a mendiola disposición servicios gratuitos de asistencia lingüística. Llame al [phone].    We comply with applicable federal civil rights laws and [place] laws. We do not discriminate on the basis of race, color, national origin, age, disability, sex, sexual orientation, or gender identity.            Thank you!     Thank you for choosing [facility]  for your care. Our goal is always to provide you with excellent care. Hearing back from our patients is one way  we can continue to improve our services. Please take a few minutes to complete the written survey that you may receive in the mail after your visit with us. Thank you!             Your Updated Medication List - Protect others around you: Learn how to safely use, store and throw away your medicines at www.disposemymeds.org.          This list is accurate as of 10/8/18 10:00 AM.  Always use your most recent med list.                   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, Cerebrovascular accident (CVA) due to other mechanism       atorvastatin 40 MG tablet    LIPITOR    60 tablet    Take 1 tablet (40 mg) by mouth daily    Cerebrovascular accident (CVA) due to other mechanism       Carboxymethylcellulose Sod PF 0.5 % Soln ophthalmic solution    REFRESH PLUS    1 Bottle    Place 1 drop Into the left eye 3 times daily as needed (to flush debris from eye)    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       carvedilol 12.5 MG tablet    COREG    180 tablet    Take 0.5 tablets (6.25 mg) by mouth 2 times daily    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, Hypertension goal BP (blood pressure) < 140/90       famotidine 20 MG tablet    PEPCID    90 tablet    Take 1 tablet (20 mg) by mouth daily    Medication monitoring encounter       FLUoxetine 20 MG capsule    PROzac    90 capsule    Take 1 capsule (20 mg) by mouth daily    Major depressive disorder, single episode, moderate (H), Anxiety       losartan 50 MG tablet    COZAAR     90 tablet    TAKE 1/2 TABLET BY MOUTH EVERY 12 HOURS    Hypertension goal BP (blood pressure) < 140/90, Congestive heart failure, NYHA class 2, unspecified congestive heart failure type (H), Cardiomyopathy, unspecified type (H)       mirtazapine 15 MG tablet    REMERON     Take 1.5 tablets (22.5 mg) by mouth At Bedtime    Anxiety       multivitamin, therapeutic with minerals Tabs tablet     30 each    Take 1 tablet by mouth daily    Cerebrovascular accident (CVA) due to other mechanism

## 2018-10-08 NOTE — TELEPHONE ENCOUNTER
Patients SO wanted us to fix Health Maintenance. We have his birth date for several things.  Arina helped me fix it.  She doesn't like that it looks inaccurate. I did explain that it isnt a perfect system.  Just make sure to to come in for px and to f/u as Dr Gordillo advises.    Meghan Martinez RN- Triage [employer]

## 2018-10-08 NOTE — PATIENT INSTRUCTIONS
Follow up for Prevnar 13 after 11/4/2018. Recommend Shingrix vaccine for shingles.    [facility]                        To reach your care team during and after hours:   [phone]  To reach our pharmacy:        [phone]    Clinic Hours                        Our clinic hours are:    Monday   7:30 am to 7:00 pm                  Tuesday through Friday 7:30 am to 5:00 pm                             Saturday   8:00 am to 12:00 pm      Sunday   Closed      Pharmacy Hours                        Our pharmacy hours are:    Monday   8:30 am to 7:00 pm       Tuesday to Friday  8:30 am to 6:00 pm                       Saturday    9:00 am to 1:00 pm              Sunday    Closed              There is also information available at our web site:  [email or web address]    If your provider ordered any lab tests and you do not receive the results within 10 business days, please call the clinic.    If you need a medication refill please contact your pharmacy.  Please allow 2-3 business days for your refill to be completed.    Our clinic offers telephone visits and e visits.  Please ask one of your team members to explain more.      Use [employer] (secure email communication and access to your chart) to send your primary care provider a message or make an appointment. Ask someone on your Team how to sign up for [employer].  Immunizations                      Immunization History   Administered Date(s) Administered     Influenza (High Dose) 3 valent vaccine 09/04/2018     Influenza Vaccine IM 3yrs+ 4 Valent IIV4 10/28/2017     Pneumococcal 23 valent 11/04/2017     TD (ADULT, 7+) 04/14/1999     TDAP Vaccine (Adacel) 10/08/2018        Health Maintenance                         Health Maintenance Due   Topic Date Due     Heart Failure Action Plan Reviewed Every 3 Years  1955     Wellness Visit with your Primary Provider - yearly  1955     COPD ACTION PLAN Q1 YR  1955     Colon Cancer Screening - FIT Test -  yearly  03/16/1963     Microalbumin Lab - yearly  02/07/2013

## 2018-10-10 NOTE — TELEPHONE ENCOUNTER
Rachid message received. RN reviewed patient's cardiac medications and dantrolene sodium with micromedex and no contraindication was listed with patient's current cardiac medications (carvedilol, aspirin, losartan, and atorvastatin). RN will send to Dr. Garzon for review and recommendation.           Dr Dai wants to put Giovanni on DANTROLENE SODIUM 25mg. 25 mg per day first week then 50mg per day second week then 75 mg per day the third week. I just am concerned about the drug and the effects on the heart. I need to here from you that this is ok to start. Thank you for your time.      4/5/18 OV note  1. 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    From a cardiac standpoint, patient is doing well.  His cardiovascular medications are appropriate.          2. Coronary artery disease status post CABG at the time of his initial valve replacement    Continue medical therapy          3. Stroke with left-sided facial droop and right-sided hemiparesis    Continue rehabilitation          4. Cardiomyopathy, improving    Follow with [facility] clinic

## 2018-10-11 NOTE — TELEPHONE ENCOUNTER
Thanks so much for running the drug ! The medication should be safe (especially in the context that Mr. Miguel's heart muscle has been fine, it was the valves and supporting tissue that was the issue with the infection). You can let them know that in some cases, dantrolene even reduces bad heart rhythms. Of course if he notices shortness of breath or chest discomfort, we can always do some testing (likely echo to start). Thanks!

## 2018-10-24 NOTE — LETTER
10/24/2018    Dipak Gordillo MD  [address] [facility] [address]    RE: Cricket Miguel       Dear Colleague,    I had the pleasure of seeing Cricket Miguel in the [facility].    HPI and Plan:   See [id number]    No orders of the defined types were placed in this encounter.      Orders Placed This Encounter   Medications     dantrolene (DANTRIUM) 25 MG capsule     Sig: Take 25 mg by mouth every other day     losartan (COZAAR) 50 MG tablet     Sig: Take 1 tablet (50 mg) by mouth daily     Dispense:  30 tablet     Refill:  1       Medications Discontinued During This Encounter   Medication Reason     losartan (COZAAR) 50 MG tablet Reorder         Encounter Diagnoses   Name Primary?     Chronic systolic congestive heart failure (H)      Hypertension goal BP (blood pressure) < 140/90      Congestive heart failure, NYHA class 2, unspecified congestive heart failure type (H)      Cardiomyopathy, unspecified type (H)        CURRENT MEDICATIONS:  Current Outpatient Prescriptions   Medication Sig Dispense Refill     albuterol (PROAIR HFA/PROVENTIL HFA/VENTOLIN HFA) 108 (90 Base) MCG/ACT inhaler Inhale 2 puffs into the lungs every 6 hours as needed for shortness of breath / dyspnea or wheezing 1 Inhaler 3     aspirin 81 MG chewable tablet 1 tablet (81 mg) by Oral or Feeding Tube route daily 60 tablet 1     atorvastatin (LIPITOR) 40 MG tablet Take 1 tablet (40 mg) by mouth daily 60 tablet 1     Carboxymethylcellulose Sod PF (REFRESH PLUS) 0.5 % SOLN ophthalmic solution Place 1 drop Into the left eye 3 times daily as needed (to flush debris from eye) 1 Bottle 1     carvedilol (COREG) 12.5 MG tablet Take 0.5 tablets (6.25 mg) by mouth 2 times daily 180 tablet 1     dantrolene (DANTRIUM) 25 MG capsule Take 25 mg by mouth every other day       famotidine (PEPCID) 20 MG tablet Take 1 tablet (20 mg) by mouth daily 90 tablet 1     FLUoxetine (PROZAC) 20 MG capsule Take 1 capsule (20 mg)  by mouth daily 90 capsule 3     losartan (COZAAR) 50 MG tablet Take 1 tablet (50 mg) by mouth daily 30 tablet 1     mirtazapine (REMERON) 15 MG tablet Take 15 mg by mouth At Bedtime        multivitamin, therapeutic with minerals (THERA-VIT-M) TABS tablet Take 1 tablet by mouth daily 30 each 0     [DISCONTINUED] losartan (COZAAR) 50 MG tablet TAKE 1/2 TABLET BY MOUTH EVERY 12 HOURS 90 tablet 3       ALLERGIES     Allergies   Allergen Reactions     Baclofen Nausea and Vomiting     Loss of bladder control     Lisinopril Swelling     One-sided facial swelling after being on lisinopril/HCTZ for one week.        PAST MEDICAL HISTORY:  Past Medical History:   Diagnosis Date     Abscess of aortic root 10/06/2017     AI (aortic insufficiency)     severe     Anxiety      Aortic root dilatation (H)      AR (aortic regurgitation)     severe     Cardiomyopathy (H)      CHF (congestive heart failure), NYHA class II (H)      Constipation     Dr Mcghee     COPD, mild (H)     spirometry 12/16     CVA (cerebral vascular accident) (H) 10/06/2017    septic emboli - MSSA - cerebellum, Left MCA, Rt Occipital, Aphasia, Left facial paralysis, Right>Left UE/LE weakness, Left visual field cut, moderate to severe encephalopathy, cognitive dysfunction, word finding     Depression 10/06/2017     Elevated PSA 05/2018    PSA = 8, Dr Garcia     Heart murmur      Hyperlipidemia LDL goal <70 10/31/2010     Hypertension goal BP (blood pressure) < 140/90      Inguinal hernia      left lung nodule  1/29/2008     Major depressive disorder, single episode, moderate (H)      Psoriasis childhood     SNHL (sensorineural hearing loss)     Left, secondary to CVA     Tobacco use disorder        PAST SURGICAL HISTORY:  Past Surgical History:   Procedure Laterality Date     ARTHROSCOPY KNEE INCISION AND DRAINAGE Left 10/8/2017    Arthroscopic Incision and Drainage of Left Knee - Dr Munoz     HC SHOULDER ARTHROSCOPY, DX  2001    Arthroscopy, Shoulder RT Dr NEWELL  Ganesh     HC SHOULDER ARTHROSCOPY, DX  1/04    LT arthroscopy Dr OSIRIS Andersen     HERNIA REPAIR, UMBILICAL  1/08    incarcerated - dr rockwell     HERNIORRHAPHY INGUINAL  12/21/2012    HERNIORRHAPHY INGUINAL;  Right Inguinal Hernia Repair with mesh ;  Surgeon: Mello Rockwell MD;  Location: [facility] OR     REPAIR ANEURYSM ASCENDING AORTA N/A 12/19/2017    REPAIR ANEURYSM ASCENDING AORTA;  REDO Median STERNOTOMY, FEMORAL CANNULATION, Lysis of adhesions, AORTIC ROOT VALVE HOMOGRAFT with 26mm x 7.0cm Cry[employer]ife Aortic valve and conduit - Dr Bowers     REPLACE VALVE AORTIC N/A 5/17/2016    REPLACE VALVE AORTIC - Dr Bowers     ROTATOR CUFF REPAIR RT/LT  11/10    Rt arthroscopy - Dr OSIRIS Andersen     SURGICAL HISTORY OF -   5/16    AVR, severe AR, aortic root repair     TRACHEOSTOMY PERCUTANEOUS  10/27/2017            FAMILY HISTORY:  Family History   Problem Relation Age of Onset     Genetic Disorder Mother      Bone plateover growth Agustin. shoulder surgeries     Cancer Mother      brain cancer     Other Cancer Mother      Alzheimer Disease Father        SOCIAL HISTORY:  Social History     Social History     Marital status:      Spouse name: N/A     Number of children: 3     Years of education: 12     Social History Main Topics     Smoking status: Former Smoker     Packs/day: 1.00     Years: 35.00     Types: Cigarettes     Quit date: 4/1/2016     Smokeless tobacco: Never Used      Comment: 4/2016     Alcohol use 0.0 oz/week      Comment: 1 drink per month,maybe     Drug use: Yes     Special: Marijuana      Comment: marijuana- daily previously     Sexual activity: Yes     Partners: Female     Other Topics Concern     Caffeine Concern Yes     3 cups     Sleep Concern No     Special Diet No     trying to watch salt     Exercise Yes     walking     Seat Belt No     Parent/Sibling W/ Cabg, Mi Or Angioplasty Before 65f 55m? No     Social History Narrative       Review of Systems:  Skin:  Positive for hair changes rash on behind,  "loosing lots of hair    Eyes:  Positive for glasses left eye is bad   ENT:  Positive for hearing loss def in left ear   Respiratory:  Positive for cough;mucoid expectorant occas. Cough.  Clearing up   Cardiovascular:    Positive for Rehab for stroke  Gastroenterology: Negative   Getting better  Genitourinary:  Positive for urinary frequency has to really concentrate to urinate   Musculoskeletal:  Positive for joint stiffness gets sore behind   Neurologic:  Positive for paralysis;stroke right sided weakness, left side facial droop  Psychiatric:  Positive for excessive stress;anxiety;depression    Heme/Lymph/Imm:  Negative      Endocrine:  Negative        Physical Exam:  Vitals: BP (!) 143/92  Pulse 88  Ht 1.702 m (5' 7\")  Wt 83.3 kg (183 lb 11.2 oz)  SpO2 95%  BMI 28.77 kg/m2    Constitutional:  cooperative        Skin:  warm and dry to the touch          Head:  normocephalic        Eyes:           Lymph:      ENT:  no pallor or cyanosis        Neck:  JVP normal        Respiratory:  normal breath sounds, clear to auscultation, normal A-P diameter, normal symmetry, normal respiratory excursion, no use of accessory muscles         Cardiac: regular rhythm       grade 1;RUSB                                                 GI:  abdomen soft        Extremities and Muscular Skeletal:  no edema              Neurological:    facial weakness;right sided weakness;tongue deviation left facial droop, right hemiparesis.    Psych:  Alert and Oriented x 3        CC  BIBI Bettencourt CNP  [address]  [address]  [place] [address]                Thank you for allowing me to participate in the care of your patient.      Sincerely,     BIBI Faust CNP     [facility]    cc:   BIBI Bettencourt CNP  [address]  [address]  [place] [address]        "

## 2018-10-24 NOTE — MR AVS SNAPSHOT
After Visit Summary   10/24/2018    Cricket Miguel    MRN: [id number]           Patient Information     Date Of Birth          1955        Visit Information        Provider Department      10/24/2018 1:50 PM Candice Olivera APRN CNP [facility]        Today's Diagnoses     Chronic systolic congestive heart failure (H)        Hypertension goal BP (blood pressure) < 140/90        Congestive heart failure, NYHA class 2, unspecified congestive heart failure type (H)        Cardiomyopathy, unspecified type (H)          Care Instructions    Call [facility] nurse for any questions or concerns:  [phone]   *If you have concerns after hours, please call [phone], option 2 to speak with on call Cardiologist.    1. Medication changes from today:  Change losartan to 1 tab daily, bring BP readings to Dr. Gordillo      2. Weigh yourself daily and write it down.     3. Call [facility] nurse if your weight is up more than 2 pounds in one day or 5 pounds in one week.     4. Call [facility] nurse if you feel more short of breath, have more abdominal bloating, or leg swelling.     5. Continue low sodium diet (less than 2000 mg daily). If you eat less salt, you will retain less fluid.     6. Alcohol can weaken your heart further. You should avoid alcohol or limit its use to special times, such as a holiday or birthday.      7. Do NOT take Aleve or ibuprofen without talking to your doctor first.      8. Lab Results: **     Component      Latest Ref Rng & Units 10/4/2018 10/24/2018   Sodium      136 - 145 mmol/L 139 141   Potassium      3.5 - 5.1 mmol/L 4.5 4.2   Chloride      98 - 107 mmol/L 106 105   Carbon Dioxide      23 - 29 mmol/L 27 28   Anion Gap      6 - 17 mmol/L 6 12.2   Glucose      70 - 105 mg/dL 85 92   Urea Nitrogen      7 - 30 mg/dL 19 19   Creatinine      0.70 - 1.30 mg/dL 1.19 1.22   GFR Estimate      >60 mL/min/1.7m2 61 60 (L)   GFR Estimate If Black      >60  mL/min/1.7m2 74 72   Calcium      8.5 - 10.5 mg/dL 9.2 9.1     [facility]: Cardiomyopathy, Optimization, Rehabilitation, Education  The [facility] is a heart failure specialty clinic within the [facility] where you will work with specialized nurse practitioners, physician assistants, doctors, and registered nurses. They are dedicated to helping patients with heart failure to carefully adjust medications, receive education, and learn who and when to call if symptoms develop. They specialize in helping you better understand your condition, slow the progression of your disease, improve the length and quality of your life, help you detect future heart problems before they become life threatening, and avoid hospitalizations.              Follow-ups after your visit        Your next 10 appointments already scheduled     Dec 04, 2018 12:00 PM CST   Office Visit with Dipak Gordillo MD   [facility]    [address] [place] [id number] [phone]           Bring a current list of meds and any records pertaining to this visit. For Physicals, please bring immunization records and any forms needing to be filled out. Please arrive 10 minutes early to complete paperwork.              Who to contact     If you have questions or need follow up information about today's clinic visit or your schedule please contact [facility] directly at [phone].  Normal or non-critical lab and imaging results will be communicated to you by MyChart, letter or phone within 4 business days after the clinic has received the results. If you do not hear from us within 7 days, please contact the clinic through MyChart or phone. If you have a critical or abnormal lab result, we will notify you by phone as soon as possible.  Submit refill requests through [employer] or call your pharmacy and they will forward the refill request to us.  "Please allow 3 business days for your refill to be completed.          Additional Information About Your Visit        [employer] Information     [employer] gives you secure access to your electronic health record. If you see a primary care provider, you can also send messages to your care team and make appointments. If you have questions, please call your primary care clinic.  If you do not have a primary care provider, please call [phone] and they will assist you.        Care EveryWhere ID     This is your Care EveryWhere ID. This could be used by other organizations to access your [place] medical records  [id number]        Your Vitals Were     Pulse Height Pulse Oximetry BMI (Body Mass Index)          88 1.702 m (5' 7\") 95% 28.77 kg/m2         Blood Pressure from Last 3 Encounters:   10/24/18 (!) 143/92   10/08/18 130/84   09/26/18 112/62    Weight from Last 3 Encounters:   10/24/18 83.3 kg (183 lb 11.2 oz)   10/08/18 78.5 kg (173 lb)   09/26/18 82 kg (180 lb 11.2 oz)              We Performed the Following     [facility]          Today's Medication Changes          These changes are accurate as of 10/24/18  2:30 PM.  If you have any questions, ask your nurse or doctor.               These medicines have changed or have updated prescriptions.        Dose/Directions    losartan 50 MG tablet   Commonly known as:  COZAAR   This may have changed:  See the new instructions.   Used for:  Hypertension goal BP (blood pressure) < 140/90, Congestive heart failure, NYHA class 2, unspecified congestive heart failure type (H), Cardiomyopathy, unspecified type (H)   Changed by:  Candice Olivera APRN CNP        Dose:  50 mg   Take 1 tablet (50 mg) by mouth daily   Quantity:  30 tablet   Refills:  1            Where to get your medicines      These medications were sent to [facility] [address] [facility] [place] - [address]  [address] [place] [address]     Phone:  [phone]     losartan 50 MG tablet    "             Primary Care Provider Office Phone # Fax #    Dipak Gordillo [phone] [address] [facility] [address]        Equal Access to Services     DANIEL BILLS : Hadliang jenise nova rasheltamika Nishi, waaxda luqadaha, qaybta kaalmada shantell, freddie ferreira jonathonjanelle rea laPaulradha waggoner. So [facility] [phone].    ATENCIÓN: Si habla español, tiene a mendiola disposición servicios gratuitos de asistencia lingüística. Llame al [phone].    We comply with applicable federal civil rights laws and [place] laws. We do not discriminate on the basis of race, color, national origin, age, disability, sex, sexual orientation, or gender identity.            Thank you!     Thank you for choosing [facility]  for your care. Our goal is always to provide you with excellent care. Hearing back from our patients is one way we can continue to improve our services. Please take a few minutes to complete the written survey that you may receive in the mail after your visit with us. Thank you!             Your Updated Medication List - Protect others around you: Learn how to safely use, store and throw away your medicines at www.disposemymeds.org.          This list is accurate as of 10/24/18  2:30 PM.  Always use your most recent med list.                   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, Cerebrovascular accident (CVA) due to other mechanism       atorvastatin 40 MG tablet    LIPITOR    60 tablet    Take 1 tablet (40 mg) by  mouth daily    Cerebrovascular accident (CVA) due to other mechanism       Carboxymethylcellulose Sod PF 0.5 % Soln ophthalmic solution    REFRESH PLUS    1 Bottle    Place 1 drop Into the left eye 3 times daily as needed (to flush debris from eye)    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       carvedilol 12.5 MG tablet    COREG    180 tablet    Take 0.5 tablets (6.25 mg) by mouth 2 times daily    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, Hypertension goal BP (blood pressure) < 140/90       dantrolene 25 MG capsule    DANTRIUM     Take 25 mg by mouth every other day        famotidine 20 MG tablet    PEPCID    90 tablet    Take 1 tablet (20 mg) by mouth daily    Medication monitoring encounter       FLUoxetine 20 MG capsule    PROzac    90 capsule    Take 1 capsule (20 mg) by mouth daily    Major depressive disorder, single episode, moderate (H), Anxiety       losartan 50 MG tablet    COZAAR    30 tablet    Take 1 tablet (50 mg) by mouth daily    Hypertension goal BP (blood pressure) < 140/90, Congestive heart failure, NYHA class 2, unspecified congestive heart failure type (H), Cardiomyopathy, unspecified type (H)       mirtazapine 15 MG tablet    REMERON     Take 15 mg by mouth At Bedtime    Anxiety       multivitamin, therapeutic with minerals Tabs tablet     30 each    Take 1 tablet by mouth daily    Cerebrovascular accident (CVA) due to other mechanism

## 2018-10-24 NOTE — PATIENT INSTRUCTIONS
Call [facility] nurse for any questions or concerns:  [phone]   *If you have concerns after hours, please call [phone], option 2 to speak with on call Cardiologist.    1. Medication changes from today:  Change losartan to 1 tab daily, bring BP readings to Dr. Gordillo      2. Weigh yourself daily and write it down.     3. Call C[facility] nurse if your weight is up more than 2 pounds in one day or 5 pounds in one week.     4. Call [facility] nurse if you feel more short of breath, have more abdominal bloating, or leg swelling.     5. Continue low sodium diet (less than 2000 mg daily). If you eat less salt, you will retain less fluid.     6. Alcohol can weaken your heart further. You should avoid alcohol or limit its use to special times, such as a holiday or birthday.      7. Do NOT take Aleve or ibuprofen without talking to your doctor first.      8. Lab Results: **     Component      Latest Ref Rng & Units 10/4/2018 10/24/2018   Sodium      136 - 145 mmol/L 139 141   Potassium      3.5 - 5.1 mmol/L 4.5 4.2   Chloride      98 - 107 mmol/L 106 105   Carbon Dioxide      23 - 29 mmol/L 27 28   Anion Gap      6 - 17 mmol/L 6 12.2   Glucose      70 - 105 mg/dL 85 92   Urea Nitrogen      7 - 30 mg/dL 19 19   Creatinine      0.70 - 1.30 mg/dL 1.19 1.22   GFR Estimate      >60 mL/min/1.7m2 61 60 (L)   GFR Estimate If Black      >60 mL/min/1.7m2 74 72   Calcium      8.5 - 10.5 mg/dL 9.2 9.1     [facility]: Cardiomyopathy, Optimization, Rehabilitation, Education  The [facility] is a heart failure specialty clinic within the [facility] where you will work with specialized nurse practitioners, physician assistants, doctors, and registered nurses. They are dedicated to helping patients with heart failure to carefully adjust medications, receive education, and learn who and when to call if symptoms develop. They specialize in helping you better understand your condition, slow the progression of your disease, improve the length  and quality of your life, help you detect future heart problems before they become life threatening, and avoid hospitalizations.

## 2018-10-24 NOTE — PROGRESS NOTES
HPI and Plan:   See [id number]    No orders of the defined types were placed in this encounter.      Orders Placed This Encounter   Medications     dantrolene (DANTRIUM) 25 MG capsule     Sig: Take 25 mg by mouth every other day     losartan (COZAAR) 50 MG tablet     Sig: Take 1 tablet (50 mg) by mouth daily     Dispense:  30 tablet     Refill:  1       Medications Discontinued During This Encounter   Medication Reason     losartan (COZAAR) 50 MG tablet Reorder         Encounter Diagnoses   Name Primary?     Chronic systolic congestive heart failure (H)      Hypertension goal BP (blood pressure) < 140/90      Congestive heart failure, NYHA class 2, unspecified congestive heart failure type (H)      Cardiomyopathy, unspecified type (H)        CURRENT MEDICATIONS:  Current Outpatient Prescriptions   Medication Sig Dispense Refill     albuterol (PROAIR HFA/PROVENTIL HFA/VENTOLIN HFA) 108 (90 Base) MCG/ACT inhaler Inhale 2 puffs into the lungs every 6 hours as needed for shortness of breath / dyspnea or wheezing 1 Inhaler 3     aspirin 81 MG chewable tablet 1 tablet (81 mg) by Oral or Feeding Tube route daily 60 tablet 1     atorvastatin (LIPITOR) 40 MG tablet Take 1 tablet (40 mg) by mouth daily 60 tablet 1     Carboxymethylcellulose Sod PF (REFRESH PLUS) 0.5 % SOLN ophthalmic solution Place 1 drop Into the left eye 3 times daily as needed (to flush debris from eye) 1 Bottle 1     carvedilol (COREG) 12.5 MG tablet Take 0.5 tablets (6.25 mg) by mouth 2 times daily 180 tablet 1     dantrolene (DANTRIUM) 25 MG capsule Take 25 mg by mouth every other day       famotidine (PEPCID) 20 MG tablet Take 1 tablet (20 mg) by mouth daily 90 tablet 1     FLUoxetine (PROZAC) 20 MG capsule Take 1 capsule (20 mg) by mouth daily 90 capsule 3     losartan (COZAAR) 50 MG tablet Take 1 tablet (50 mg) by mouth daily 30 tablet 1     mirtazapine (REMERON) 15 MG tablet Take 15 mg by mouth At Bedtime        multivitamin, therapeutic with  minerals (THERA-VIT-M) TABS tablet Take 1 tablet by mouth daily 30 each 0     [DISCONTINUED] losartan (COZAAR) 50 MG tablet TAKE 1/2 TABLET BY MOUTH EVERY 12 HOURS 90 tablet 3       ALLERGIES     Allergies   Allergen Reactions     Baclofen Nausea and Vomiting     Loss of bladder control     Lisinopril Swelling     One-sided facial swelling after being on lisinopril/HCTZ for one week.        PAST MEDICAL HISTORY:  Past Medical History:   Diagnosis Date     Abscess of aortic root 10/06/2017     AI (aortic insufficiency)     severe     Anxiety      Aortic root dilatation (H)      AR (aortic regurgitation)     severe     Cardiomyopathy (H)      CHF (congestive heart failure), NYHA class II (H)      Constipation     Dr Mcghee     COPD, mild (H)     spirometry 12/16     CVA (cerebral vascular accident) (H) 10/06/2017    septic emboli - MSSA - cerebellum, Left MCA, Rt Occipital, Aphasia, Left facial paralysis, Right>Left UE/LE weakness, Left visual field cut, moderate to severe encephalopathy, cognitive dysfunction, word finding     Depression 10/06/2017     Elevated PSA 05/2018    PSA = 8, Dr Garcia     Heart murmur      Hyperlipidemia LDL goal <70 10/31/2010     Hypertension goal BP (blood pressure) < 140/90      Inguinal hernia      left lung nodule  1/29/2008     Major depressive disorder, single episode, moderate (H)      Psoriasis childhood     SNHL (sensorineural hearing loss)     Left, secondary to CVA     Tobacco use disorder        PAST SURGICAL HISTORY:  Past Surgical History:   Procedure Laterality Date     ARTHROSCOPY KNEE INCISION AND DRAINAGE Left 10/8/2017    Arthroscopic Incision and Drainage of Left Knee - Dr Munoz     HC SHOULDER ARTHROSCOPY, DX  2001    Arthroscopy, Shoulder RT Dr OSIRIS Andersen     HC SHOULDER ARTHROSCOPY, DX  1/04    LT arthroscopy Dr OSIRIS Andersen     HERNIA REPAIR, UMBILICAL  1/08    incarcerated - dr puente     HERNIORRHAPHY INGUINAL  12/21/2012    HERNIORRHAPHY INGUINAL;  Right  Inguinal Hernia Repair with mesh ;  Surgeon: Mello Rockwell MD;  Location: [facility] OR     REPAIR ANEURYSM ASCENDING AORTA N/A 12/19/2017    REPAIR ANEURYSM ASCENDING AORTA;  REDO Median STERNOTOMY, FEMORAL CANNULATION, Lysis of adhesions, AORTIC ROOT VALVE HOMOGRAFT with 26mm x 7.0cm Cryolife Aortic valve and conduit - Dr Bowers     REPLACE VALVE AORTIC N/A 5/17/2016    REPLACE VALVE AORTIC - Dr Bowers     ROTATOR CUFF REPAIR RT/LT  11/10    Rt arthroscopy - Dr OSIRIS Andersen     SURGICAL HISTORY OF -   5/16    AVR, severe AR, aortic root repair     TRACHEOSTOMY PERCUTANEOUS  10/27/2017            FAMILY HISTORY:  Family History   Problem Relation Age of Onset     Genetic Disorder Mother      Bone plateover growth Agustin. shoulder surgeries     Cancer Mother      brain cancer     Other Cancer Mother      Alzheimer Disease Father        SOCIAL HISTORY:  Social History     Social History     Marital status:      Spouse name: N/A     Number of children: 3     Years of education: 12     Social History Main Topics     Smoking status: Former Smoker     Packs/day: 1.00     Years: 35.00     Types: Cigarettes     Quit date: 4/1/2016     Smokeless tobacco: Never Used      Comment: 4/2016     Alcohol use 0.0 oz/week      Comment: 1 drink per month,maybe     Drug use: Yes     Special: Marijuana      Comment: marijuana- daily previously     Sexual activity: Yes     Partners: Female     Other Topics Concern     Caffeine Concern Yes     3 cups     Sleep Concern No     Special Diet No     trying to watch salt     Exercise Yes     walking     Seat Belt No     Parent/Sibling W/ Cabg, Mi Or Angioplasty Before 65f 55m? No     Social History Narrative       Review of Systems:  Skin:  Positive for hair changes rash on behind, loosing lots of hair    Eyes:  Positive for glasses left eye is bad   ENT:  Positive for hearing loss def in left ear   Respiratory:  Positive for cough;mucoid expectorant occas. Cough.  Clearing up   Cardiovascular:  "   Positive for Rehab for stroke  Gastroenterology: Negative   Getting better  Genitourinary:  Positive for urinary frequency has to really concentrate to urinate   Musculoskeletal:  Positive for joint stiffness gets sore behind   Neurologic:  Positive for paralysis;stroke right sided weakness, left side facial droop  Psychiatric:  Positive for excessive stress;anxiety;depression    Heme/Lymph/Imm:  Negative      Endocrine:  Negative        Physical Exam:  Vitals: BP (!) 143/92  Pulse 88  Ht 1.702 m (5' 7\")  Wt 83.3 kg (183 lb 11.2 oz)  SpO2 95%  BMI 28.77 kg/m2    Constitutional:  cooperative        Skin:  warm and dry to the touch          Head:  normocephalic        Eyes:           Lymph:      ENT:  no pallor or cyanosis        Neck:  JVP normal        Respiratory:  normal breath sounds, clear to auscultation, normal A-P diameter, normal symmetry, normal respiratory excursion, no use of accessory muscles         Cardiac: regular rhythm       grade 1;RUSB                                                 GI:  abdomen soft        Extremities and Muscular Skeletal:  no edema              Neurological:    facial weakness;right sided weakness;tongue deviation left facial droop, right hemiparesis.    Psych:  Alert and Oriented x 3        CC  Candice Olivera, APRN CNP  [address]  [address]  [place] [address]              "

## 2018-10-24 NOTE — LETTER
10/24/2018      Dipak Gordillo MD  [address] [facility] [address]      RE: Cricket Miguel       Dear Colleague,    I had the pleasure of seeing Cricket Miguel in the [facility].    Service Date: 10/24/2018      HISTORY OF PRESENT ILLNESS:  I had the pleasure of seeing Mr. 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  He had dental work done in the meantime, which may have been the source of the infection.  His cardiomyopathy improved.  His echocardiogram 04/2018 showed improvement in his LVEF to 50%.      In May of this year, he presented to the Emergency Room and diagnosed with C. difficile and discharged on antibiotics.  He follows with Dr. Dipak Gordillo for his general care.      He recently requested to follow up with myself regarding considering weaning his diuretic and potassium.  He has done very well with the weaning off of these 2 medications.  His weight is stable.  No signs or symptoms of congestive heart failure.  He recently had dental work with multiple antibiotics with a bump in his creatinine.  He then followed up with Dr. Gordillo and his creatinine improved.  Today again his creatinine is at baseline.      PHYSICAL EXAMINATION:   VITAL SIGNS:  Blood pressure 143/92, pulse is 88, and weight is 183 pounds.   Please see complete physical examination below.      DIAGNOSTICS:  Basic metabolic panel:  Sodium 141, potassium 4.2, BUN 19, creatinine 1.22, GFR 60.      IMPRESSION AND PLAN:   1.  History of infective endocarditis, status post aortic valve and ascending aortic replacement, subsequent stroke and dehiscence requiring replacement.  From a cardiac standpoint, he has done well.  The patient and his significant other recently requested considering eliminating diuretics and potassium if possible.  We did this  in a step-by-step fashion, decreased the Bumex and potassium in half.  His weight remained stable.  He returns today after holding it since 2018.  His weight has remained stable.  I have instructed him that he can stop the Bumex and potassium.   2.  Hypertension:  His blood pressure has been elevated.  His significant other gave me multiple blood pressure readings at home, where the blood pressures have been greater than 130/80.  He is currently on 25 mg twice a day of losartan.  I have changed losartan to 50 mg once a day.  This is not an increase, but this is a step-by-step fashion where I have instructed him to take 1 tablet a day and he will see Dr. Gordillo next month and if needed that dose can be increased to 50 mg twice a day.      I have asked the patient's significant other to bring blood pressure readings to Dr. Gordillo.         BIBI MARCIAL, CNP             D: 10/24/2018   T: 10/24/2018   MT: al      Name:     ASHTYN STROUD   MRN:      [id number]        Account:      [id number]   :      1955           Service Date: 10/24/2018      Document: [id number]           Outpatient Encounter Prescriptions as of 10/24/2018   Medication Sig Dispense Refill     albuterol (PROAIR HFA/PROVENTIL HFA/VENTOLIN HFA) 108 (90 Base) MCG/ACT inhaler Inhale 2 puffs into the lungs every 6 hours as needed for shortness of breath / dyspnea or wheezing 1 Inhaler 3     aspirin 81 MG chewable tablet 1 tablet (81 mg) by Oral or Feeding Tube route daily 60 tablet 1     atorvastatin (LIPITOR) 40 MG tablet Take 1 tablet (40 mg) by mouth daily 60 tablet 1     Carboxymethylcellulose Sod PF (REFRESH PLUS) 0.5 % SOLN ophthalmic solution Place 1 drop Into the left eye 3 times daily as needed (to flush debris from eye) 1 Bottle 1     carvedilol (COREG) 12.5 MG tablet Take 0.5 tablets (6.25 mg) by mouth 2 times daily 180 tablet 1     dantrolene (DANTRIUM) 25 MG capsule Take 25 mg by mouth every other day        famotidine (PEPCID) 20 MG tablet Take 1 tablet (20 mg) by mouth daily 90 tablet 1     FLUoxetine (PROZAC) 20 MG capsule Take 1 capsule (20 mg) by mouth daily 90 capsule 3     losartan (COZAAR) 50 MG tablet Take 1 tablet (50 mg) by mouth daily 30 tablet 1     mirtazapine (REMERON) 15 MG tablet Take 15 mg by mouth At Bedtime        multivitamin, therapeutic with minerals (THERA-VIT-M) TABS tablet Take 1 tablet by mouth daily 30 each 0     [DISCONTINUED] losartan (COZAAR) 50 MG tablet TAKE 1/2 TABLET BY MOUTH EVERY 12 HOURS 90 tablet 3     No facility-administered encounter medications on file as of 10/24/2018.        Again, thank you for allowing me to participate in the care of your patient.      Sincerely,    BIBI Faust [facility]

## 2018-10-25 NOTE — PROGRESS NOTES
Service Date: 10/24/2018      HISTORY OF PRESENT ILLNESS:  I had the pleasure of seeing Mr. 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  He had dental work done in the meantime, which may have been the source of the infection.  His cardiomyopathy improved.  His echocardiogram 04/2018 showed improvement in his LVEF to 50%.      In May of this year, he presented to the Emergency Room and diagnosed with C. difficile and discharged on antibiotics.  He follows with Dr. Dipak Gordillo for his general care.      He recently requested to follow up with myself regarding considering weaning his diuretic and potassium.  He has done very well with the weaning off of these 2 medications.  His weight is stable.  No signs or symptoms of congestive heart failure.  He recently had dental work with multiple antibiotics with a bump in his creatinine.  He then followed up with Dr. Gordillo and his creatinine improved.  Today again his creatinine is at baseline.      PHYSICAL EXAMINATION:   VITAL SIGNS:  Blood pressure 143/92, pulse is 88, and weight is 183 pounds.   Please see complete physical examination below.      DIAGNOSTICS:  Basic metabolic panel:  Sodium 141, potassium 4.2, BUN 19, creatinine 1.22, GFR 60.      IMPRESSION AND PLAN:   1.  History of infective endocarditis, status post aortic valve and ascending aortic replacement, subsequent stroke and dehiscence requiring replacement.  From a cardiac standpoint, he has done well.  The patient and his significant other recently requested considering eliminating diuretics and potassium if possible.  We did this in a step-by-step fashion, decreased the Bumex and potassium in half.  His weight remained stable.  He returns today after holding it since 09/26/2018.  His weight has remained stable.  I have instructed him that he can stop  the Bumex and potassium.   2.  Hypertension:  His blood pressure has been elevated.  His significant other gave me multiple blood pressure readings at home, where the blood pressures have been greater than 130/80.  He is currently on 25 mg twice a day of losartan.  I have changed losartan to 50 mg once a day.  This is not an increase, but this is a step-by-step fashion where I have instructed him to take 1 tablet a day and he will see Dr. Gordillo next month and if needed that dose can be increased to 50 mg twice a day.      I have asked the patient's significant other to bring blood pressure readings to Dr. Gordillo.         BIBI MARCIAL, CNP             D: 10/24/2018   T: 10/24/2018   MT: al      Name:     ASHTYN STROUD   MRN:      [id number]        Account:      [id number]   :      1955           Service Date: 10/24/2018      Document: [id number]

## 2018-10-29 NOTE — TELEPHONE ENCOUNTER
Called patient's significant other Meghna back with instructions from Candice Olivera CNP to increase losartan to 50 mg twice daily. She wrote down and repeated back the instruction. She will call on Monday with a blood pressure update per provider request. E-prescription updated to [facility] in [place]. Nu RAE

## 2018-10-29 NOTE — TELEPHONE ENCOUNTER
MORALES from Meghna, patient's SO, who is requesting a call back from a nurse. Spoke with patient's SO. Meghna states that over the past few days, the patient's diastolic number in his BP readings has been increasing. Meghna states that his diastolic readings have been anywhere from 90s-100s. Meghna states that today, his BP was 144/102. Meghna states that the patient denies any headaches, shortness of breath, chest pain, or increased swelling. Patient saw Ant Olivera in clinic on 10/24/18, patient's losartan dosing was changed to 50 mg daily instead of 25 mg BID. Will route to [place] ALIZE RN and Ant Olivera for review.

## 2018-10-29 NOTE — TELEPHONE ENCOUNTER
Instruct patient to increase losartan to 50mg bid. Please ask her to call you next week with Bp readings. Duc

## 2018-11-05 NOTE — PROGRESS NOTES
Called patient's signficant other, Meghna, for home blood pressure readings per Candice Olivera CNP.  10/24/18 losartan was changed from 25 mg BID to 50 mg once daily.  10/29/18 losartan was increased to 50 mg BID per Ant Olivera CNP for blood pressures 140s/100s.  Meghna was driving. She will call back today or tomorrow.  Nu RAE    Addendum 11/6/18 10:20 AM:  Home blood pressures sitting on same arm per Meghna.  10/29/18: 2pm 144/102 HR 58  10/30/18: 3:30 pm 162/110, 6 pm 166/103,   10/31/18: 9AM 171/107, 9:30 149/102, 3PM 143/102, 7 /81  11/1/18: 10:30 135/89  11/2/18: 4 pm 142/96  11/3  11/4/18: 9 /93  11/5/18: 10:30 /95  No swelling, no illness, no pain, no trouble urinating, weaning down on mirtazapine  Update routed to Ant Olivera CNP.  Next [facility] office visit 1/2/19.  Nu RAE

## 2018-11-06 NOTE — PROGRESS NOTES
Left a voicemail for patient's significant other to call back for response from Ant Olivera CNP. Nu RAE    Addendum 11/6/18 1:38 PM:  Reviewed recommendations with Candice Olivera CNP with patient's significant other, Meghna. She will call back in about a month with weekly home blood pressure readings. Nu RAE

## 2018-11-26 NOTE — TELEPHONE ENCOUNTER
Cricket would like to come in for labs (if needed) before his appointment on 12/4/18 so he will have the results for appt with Dr Gordillo.    Please call if labs will be needed and orders are placed.    Phone: [phone]    Della Salmeron  Patient Representative

## 2018-11-27 NOTE — TELEPHONE ENCOUNTER
Future orders placed.    Attempt # 1 to [phone]    Line busy. Unable to leave .    Please schedule him for non-fasting lab appointment when if calls back.    Merary Kiran, RANJEET, RN, PHN  [facility]

## 2018-11-28 NOTE — TELEPHONE ENCOUNTER
Called patient @ # below - spoke with VERONICA Coffman ([facility] on file) and patient     Lab Only scheduled for 11/29/2018      Joycelyn Jones RN  [place] [facility]

## 2018-11-28 NOTE — TELEPHONE ENCOUNTER
"Requested Prescriptions   Pending Prescriptions Disp Refills     ondansetron (ZOFRAN) 4 MG tablet 20 tablet 0    Last Written Prescription Date:  1/4/2018  Last Fill Quantity: 120,  # refills: 1   Last office visit: 10/8/2018 with prescribing provider:  Duy Wong Office Visit:   Next 5 appointments (look out 90 days)     Dec 04, 2018 12:00 PM CST   [facility] Visit with Dipak [facility]illo [facility]   [facility]    [address] [place] [id number] [phone]                  Sig: Take 1-2 tablets (4-8mg) every 8 hours as needed for nausea     Antivertigo/Antiemetic Agents Passed    11/28/2018  9:14 AM       Passed - Recent (12 mo) or future (30 days) visit within the authorizing provider's specialty    Patient had office visit in the last 12 months or has a visit in the next 30 days with authorizing provider or within the authorizing provider's specialty.  See \"Patient Info\" tab in inbasket, or \"Choose Columns\" in Meds & Orders section of the refill encounter.             Passed - Patient is 18 years of age or older        Medication was discontinued on 3/10/2018.    NEAL Lopez, RN  [facility]    "

## 2018-11-28 NOTE — TELEPHONE ENCOUNTER
Refilled per RN Protocol.     Joycelyn Jones, RN  [facility]

## 2018-12-04 NOTE — PROGRESS NOTES
SUBJECTIVE:   Cricket Miguel is a 63 year old male who presents to clinic today for the following health issues:    HX of CVA 10/17(residual asphasia, left facial droop, R>L UE & LE weakness, Left visual field cut, cognitive concerns)/Endocarditis with cardiomyopathy/CHF/Aortic root dilatation/COPD    Constipation -  reviewed    Hyperlipidemia Follow-Up  Patient's hyperlipidemia is well controlled. Patient is currently prescribed 40 mg Atorvastatin daily for hyperlipidemia management.    Rate your low fat/cholesterol diet?: fair    Taking statin?  Yes, no muscle aches from statin    Other lipid medications/supplements?:  none    Recent Labs   Lab Test  08/24/18   1035  04/25/18   0941   02/07/12   1214   CHOL  115  107   < >  198   HDL  45  39*   < >  47   LDL  47  41   < >  123   TRIG  116  133   < >  138   CHOLHDLRATIO   --    --    --   4.2    < > = values in this interval not displayed.     Hypertension Follow-up  Patient presents today as hypertensive(154/99). Patient is currently prescribed 6.25 mg Coreg BID and 50 mg Losartan BID for hypertension management.    Outpatient blood pressures are being checked at home.  Results are 140/107.    Low Salt Diet: low salt    BP Readings from Last 5 Encounters:   12/13/18 114/77   12/04/18 (!) 154/99   10/24/18 (!) 143/92   10/08/18 130/84   09/26/18 112/62     Creatinine   Date Value Ref Range Status   12/12/2018 1.20 0.66 - 1.25 mg/dL Final     Depression and Anxiety Follow-Up  Patient had a PHQ9 score of 5 and a GAD7 score of 3 today. Patient's depression/anxiety is well controlled. Patient is currently prescribed 22 mg Mirtazapine daily and 20 mg Fluoxetine daily for depression/anxiety management.    Status since last visit: Improved     Other associated symptoms:None    Complicating factors:     Significant life event: No     Current substance abuse: None    PHQ 6/6/2016 5/15/2018 12/4/2018   PHQ-9 Total Score 0 8 5   Q9: Suicide Ideation Not at all Not at all  Not at all     HILARY-7 SCORE 6/6/2016 5/15/2018 12/4/2018   Total Score - - -   Total Score 0 5 3     PHQ-9  English  PHQ-9   Any Language  HILARY-7  Suicide Assessment Five-step Evaluation and Treatment (SAFE-T)    Amount of exercise or physical activity: patient in physical therapy , experiencing nausea during this activity     Problems taking medications regularly: No    Medication side effects: none    Diet: low salt    Patient's wife reports that his left arm might be developing a twitch. Patient's SO reports patient's appetite is decreased. Fluids have been going okay but patient has decreased urination. Patient reports constipation frequently and Miralax does not help. Patient currently participates in PT and OT. Patient has been having some nausea with 1 episode of vomiting since starting the Dantrium. Patient's SO reports that his nausea occurs most when he exerts himself at PT and OT and he had to cut a PT session short because of it.    10/8    Recheck               Kidneys: Patient presented today due to decreased kidney function discovered by labs performed on 9/26. His labs have returned to baseline. Patient received Ampicillin and Vancomycin infusion on 9/10 at infusion center. Kidney issues believed to be caused by Vancomycin.           Lab Results   Component Value Date     CR 1.19 10/04/2018      CHF: Patient saw Candice Chandler of Cardiology on 9/26. Patient had labs ordered then. Patient's kidney function was down and was referred to PCP.     Hypertension: Patient presents today as normotensive. Patient is currently prescribed 6.25 mg Carvedilol daily and 100 mg Losartan daily for hypertension management.         BP Readings from Last 3 Encounters:   10/08/18 130/84   09/26/18 112/62   09/10/18 126/87      Hyperlipidemia: Patient is currently prescribed 40 mg Atorvastatin daily for hyperlipidemia management.            Recent Labs   Lab Test  08/24/18   1035  04/25/18   0941    02/07/12   1214   CHOL   115  107   < >  198   HDL  45  39*   < >  47   LDL  47  41   < >  123   TRIG  116  133   < >  138   CHOLHDLRATIO   --    --    --   4.2    < > = values in this interval not displayed.      Depression/Anxiety: Stable. Patient is currently prescribed 22 mg Mirtazapine daily and 20 mg Fluoxetine daily for depression/anxiety management.     Patient's wife requesting note saying that dental implants were necessary and that dentures would not have worked for the patient.    Problem list and histories reviewed & adjusted, as indicated.  Additional history: as documented    BP Readings from Last 3 Encounters:   12/13/18 114/77   12/04/18 (!) 154/99   10/24/18 (!) 143/92       body mass index is 28.82 kg/m .    Wt Readings from Last 4 Encounters:   12/13/18 84.3 kg (185 lb 13.6 oz)   12/04/18 83.5 kg (184 lb)   10/24/18 83.3 kg (183 lb 11.2 oz)   10/08/18 78.5 kg (173 lb)       Health Maintenance    Health Maintenance Due   Topic Date Due     HF ACTION PLAN Q3 YR  1955     COPD ACTION PLAN Q1 YR  1955     ZOSTER IMMUNIZATION (1 of 2) 03/16/2003     MICROALBUMIN Q1 YEAR  02/07/2013       Current Problem List    Patient Active Problem List   Diagnosis     Tobacco use disorder     Hypertension goal BP (blood pressure) < 140/90     Disease of lung     Major depressive disorder, single episode, moderate (HCC)     Psoriasis     COPD, mild (H)     CHF (congestive heart failure), NYHA class II (H)     AR (aortic regurgitation)     AI (aortic insufficiency)     Aortic root dilatation (H)     Hyperlipidemia LDL goal <70     Health Care Home     Status post coronary angiogram     Cardiomyopathy (H)     S/P AVR (aortic valve replacement)     H/O aortic root repair     Anemia     Endocarditis     Encephalopathy     Abscess of aortic root     CVA (cerebral vascular accident) (H)     Anxiety     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     SNHL (sensorineural hearing loss)     Hx of aortic root  repair     Prophylactic antibiotic     Elevated PSA     Constipation     History of CVA (cerebrovascular accident)     History of endocarditis     Hypertensive urgency     Dissection of aorta (H)     Thoracic aortic aneurysm (H)       Past Medical History    Past Medical History:   Diagnosis Date     Abscess of aortic root 10/06/2017     AI (aortic insufficiency)     severe     Anxiety      Aortic root dilatation (H)      AR (aortic regurgitation)     severe     Cardiomyopathy (H)      CHF (congestive heart failure), NYHA class II (H)      Constipation     Dr Mcghee     COPD, mild (H)     spirometry 12/16     CVA (cerebral vascular accident) (H) 10/06/2017    septic emboli - MSSA - cerebellum, Left MCA, Rt Occipital, Aphasia, Left facial paralysis, Right>Left UE/LE weakness, Left visual field cut, moderate to severe encephalopathy, cognitive dysfunction, word finding     Depression 10/06/2017     Elevated PSA 05/2018    PSA = 8, Dr Garcia     Heart murmur      Hyperlipidemia LDL goal <70 10/31/2010     Hypertension goal BP (blood pressure) < 140/90      Inguinal hernia      left lung nodule  1/29/2008     Major depressive disorder, single episode, moderate (H)      Psoriasis childhood     SNHL (sensorineural hearing loss)     Left, secondary to CVA     Thoracic aortic aneurysm (H) 12/2018    7.1 cm     Tobacco use disorder        Past Surgical History    Past Surgical History:   Procedure Laterality Date     ARTHROSCOPY KNEE INCISION AND DRAINAGE Left 10/8/2017    Arthroscopic Incision and Drainage of Left Knee - Dr Munoz     HC SHOULDER ARTHROSCOPY, DX  2001    Arthroscopy, Shoulder RT Dr OSIRIS Andersen     HC SHOULDER ARTHROSCOPY, DX  1/04    LT arthroscopy Dr OSIRIS Andersen     HERNIA REPAIR, UMBILICAL  1/08    incarcerated - dr rockwell     HERNIORRHAPHY INGUINAL  12/21/2012    HERNIORRHAPHY INGUINAL;  Right Inguinal Hernia Repair with mesh ;  Surgeon: Mello Rockwell MD;  Location: [facility] OR     REPAIR ANEURYSM ASCENDING  AORTA N/A 12/19/2017    REPAIR ANEURYSM ASCENDING AORTA;  REDO Median STERNOTOMY, FEMORAL CANNULATION, Lysis of adhesions, AORTIC ROOT VALVE HOMOGRAFT with 26mm x 7.0cm Cryolife Aortic valve and conduit - Dr Bowers     REPLACE VALVE AORTIC N/A 5/17/2016    REPLACE VALVE AORTIC - Dr Bowers     ROTATOR CUFF REPAIR RT/LT  11/10    Rt arthroscopy - Dr OSIRIS Andersen     SURGICAL HISTORY OF -   5/16    AVR, severe AR, aortic root repair     TRACHEOSTOMY PERCUTANEOUS  10/27/2017            Current Medications    Current Outpatient Medications   Medication Sig Dispense Refill     albuterol (PROAIR HFA/PROVENTIL HFA/VENTOLIN HFA) 108 (90 Base) MCG/ACT inhaler Inhale 2 puffs into the lungs every 6 hours as needed for shortness of breath / dyspnea or wheezing 1 Inhaler 3     aspirin 81 MG chewable tablet 1 tablet (81 mg) by Oral or Feeding Tube route daily 60 tablet 1     atorvastatin (LIPITOR) 40 MG tablet Take 1 tablet (40 mg) by mouth daily 60 tablet 1     Carboxymethylcellulose Sod PF (REFRESH PLUS) 0.5 % SOLN ophthalmic solution Place 1 drop Into the left eye 3 times daily as needed (to flush debris from eye) 1 Bottle 1     carvedilol (COREG) 12.5 MG tablet Take 1 tablet (12.5 mg) by mouth 2 times daily 180 tablet 3     FLUoxetine (PROZAC) 20 MG capsule Take 1 capsule (20 mg) by mouth daily 90 capsule 3     losartan (COZAAR) 50 MG tablet Take 1 tablet (50 mg) by mouth 2 times daily 180 tablet 1     multivitamin, therapeutic with minerals (THERA-VIT-M) TABS tablet Take 1 tablet by mouth daily 30 each 0     ondansetron (ZOFRAN) 4 MG tablet Take 1-2 tablets (4-8mg) every 8 hours as needed for nausea 20 tablet 0     acetaminophen (TYLENOL) 650 MG suppository Place 1 suppository (650 mg) rectally every 4 hours as needed for fever or mild pain (Do not exceed 4000 mg total acetaminophen per day.) Unwrap prior to insertion. 12 suppository 1     amLODIPine (NORVASC) 10 MG tablet Take 1 tablet (10 mg) by mouth daily 30 tablet 0      bisacodyl (DULCOLAX) 10 MG suppository Unwrap and insert 1 suppository rectally twice daily as needed for constipation. 12 suppository 1     famotidine (PEPCID) 20 MG tablet TAKE 1 TABLET BY MOUTH EVERY DAY 90 tablet 1     haloperidol (HALDOL) 2 MG/ML (HIGH CONC) solution Take 0.25-0.5 mLs (0.5-1 mg) by mouth, place under tongue or insert rectally every 6 hours as needed for agitation (nausea) 30 mL 1     hydrALAZINE (APRESOLINE) 10 MG tablet Take 1 tablet (10 mg) by mouth 2 times daily as needed (Hypertension if systolic blood pressure is over 120 and diastolic is over 90.) 90 tablet 0     LORazepam (ATIVAN) 2 MG/ML (HIGH CONC) solution Take 0.13-0.25 mLs (0.26-0.5 mg) by mouth, place under tongue or insert rectally every 4 hours as needed for anxiety (restlessness) 30 mL 1     MEDICATION INSTRUCTION If care facility cannot accept or use ranges, facility is instructed to use lower end of dosing range       metoclopramide (REGLAN) 5 MG tablet Take 1 tablet (5 mg) by mouth every 6 hours as needed (nausea and vomiting) 30 tablet 1     morphine sulfate, high concentrate, (ROXANOL-CONCENTRATED) 20 MG/ML concentrated solution Take 0.125-0.25 mLs (2.5-5 mg) by mouth every 2 hours as needed for shortness of breath / dyspnea or breakthrough pain 30 mL 0     polyethylene glycol (MIRALAX/GLYCOLAX) packet Take 17 g by mouth 2 times daily 60 packet 0     sennosides (SENOKOT) 8.6 MG tablet Take 1-2 tablets by mouth 2 times daily 100 tablet 1       Allergies    Allergies   Allergen Reactions     Baclofen Nausea and Vomiting     Loss of bladder control     Lisinopril Swelling     One-sided facial swelling after being on lisinopril/HCTZ for one week.        Immunizations    Immunization History   Administered Date(s) Administered     Influenza (High Dose) 3 valent vaccine 09/04/2018     Influenza Vaccine IM 3yrs+ 4 Valent IIV4 10/28/2017     Pneumo Conj 13-V (2010&after) 12/04/2018     Pneumococcal 23 valent 11/04/2017     TD  (ADULT, 7+) 1999     TDAP Vaccine (Adacel) 10/08/2018       Family History    Family History   Problem Relation Age of Onset     Genetic Disorder Mother         Bone plateover growth Agustin. shoulder surgeries     Cancer Mother         brain cancer     Other Cancer Mother      Alzheimer Disease Father        Social History    Social History     Socioeconomic History     Marital status:      Spouse name: Not on file     Number of children: 3     Years of education: 12     Highest education level: Not on file   Social Needs     Financial resource strain: Not on file     Food insecurity - worry: Not on file     Food insecurity - inability: Not on file     Transportation needs - medical: Not on file     Transportation needs - non-medical: Not on file   Occupational History     Not on file   Tobacco Use     Smoking status: Former Smoker     Packs/day: 1.00     Years: 35.00     Pack years: 35.00     Types: Cigarettes     Last attempt to quit: 2016     Years since quittin.7     Smokeless tobacco: Never Used     Tobacco comment: 2016   Substance and Sexual Activity     Alcohol use: Yes     Alcohol/week: 0.0 oz     Comment: 1 drink per month,maybe     Drug use: Yes     Types: Marijuana     Comment: marijuana- daily previously     Sexual activity: Yes     Partners: Female   Other Topics Concern      Service Not Asked     Blood Transfusions Not Asked     Caffeine Concern Yes     Comment: 3 cups     Occupational Exposure Not Asked     Hobby Hazards Not Asked     Sleep Concern No     Stress Concern Not Asked     Weight Concern Not Asked     Special Diet No     Comment: trying to watch salt     Back Care Not Asked     Exercise Yes     Comment: walking     Bike Helmet Not Asked     Seat Belt No     Self-Exams Not Asked     Parent/sibling w/ CABG, MI or angioplasty before 65F 55M? No   Social History Narrative     Not on file       All above reviewed and updated, all stable unless otherwise  "noted    Recent labs reviewed    ROS:  Constitutional, HEENT, cardiovascular, pulmonary, GI, , musculoskeletal, neuro, skin, endocrine and psych systems are negative, except as otherwise noted.    OBJECTIVE:                                                    BP (!) 154/99   Pulse 57   Temp 97  F (36.1  C) (Tympanic)   Ht 1.702 m (5' 7\")   Wt 83.5 kg (184 lb)   SpO2 98%   BMI 28.82 kg/m    Body mass index is 28.82 kg/m .  GENERAL: healthy, alert and no distress  EYES: Eyes grossly normal to inspection  HENT:ear canals and TM's normal upon viewing with otoscope, nose and mouth without ulcers or lesions upon viewing with otoscope  NECK: no tenderness, no adenopathy, no asymmetry, no masses, no stiffness; thyroid- normal to palpation  RESP: lungs clear to auscultation - no rales, no rhonchi, no wheezes  CV: regular rates and rhythm, normal S1 S2, no S3 or S4 and no murmur, no click or rub -  ABDOMEN: soft, no tenderness, no  hepatosplenomegaly, no masses, normal bowel sounds  MS: extremities- no gross deformities noted, no edema, Left facial droop, residual right arm and leg weakness  SKIN: no suspicious lesions, no rashes  NEURO: strength and tone- normal, sensory exam- grossly normal, mentation- intact, speech- normal  BACK: no CVA tenderness, no paralumbar tenderness  PSYCH: Alert and oriented times 3; speech- coherent , normal rate and volume; able to articulate logical thoughts, able to abstract reason, no tangential thoughts, no hallucinations or delusions, affect- normal    DIAGNOSTICS/PROCEDURES:                                                      No results found for this or any previous visit (from the past 24 hour(s)).     ASSESSMENT/PLAN:                                                        ICD-10-CM    1. History of CVA (cerebrovascular accident) Z86.73    2. History of endocarditis Z86.79    3. Congestive heart failure, NYHA class 2, unspecified congestive heart failure type (H) I50.9    4. " Aortic root dilatation (H) I77.810    5. S/P AVR (aortic valve replacement) Z95.2    6. H/O aortic root repair Z98.890    7. Hypertension goal BP (blood pressure) < 140/90 I10 carvedilol (COREG) 12.5 MG tablet   8. Hyperlipidemia LDL goal <70 E78.5    9. COPD, mild (H) J44.9    10. Constipation, unspecified constipation type K59.00    11. Major depressive disorder, single episode, moderate (HCC) F32.1    12. Anxiety F41.9    13. Medication monitoring encounter Z51.81    14. Need for pneumococcal vaccination Z23 PNEUMOCOCCAL CONJ VACCINE 13 VALENT IM       Discussed treatment/modality options, including risk and benefits, he desires advised aspirin 81 mg po daily, advised 1 multivitamin per day, advised dentist every 6 months, advised diet and exercise and advised opthalmologist every 1-2 years. All diagnosis above reviewed and noted above, otherwise stable.  See [facility] orders for further details.  Follow up as needed.    1) Patient's hyperlipidemia is well controlled. Patient is currently prescribed 40 mg Atorvastatin daily for hyperlipidemia management. Advised continued use.    2) Patient presents today as hypertensive(154/99). Patient is currently prescribed 6.25 mg Coreg BID and 50 mg Losartan BID for hypertension management. Recommend patient increase Coreg dosage to 12.5 mg Coreg BID for further hypertension management.    3) Patient had a PHQ9 score of 5 and a GAD7 score of 3 today. Patient's depression/anxiety is well controlled. Patient is currently prescribed 20 mg Prozac daily for depression/anxiety management. Advised continued use. Follow up with psychiatrist later today.    4) Recommend 2 cap fulls of Miralax every day for 2 weeks for constipation management.    5) Recommend Zofran use 30 minutes before PT due to nausea and vomiting. Patien advised to discontinue Dantrium to prevent nausea.    6) Recommend patient continue PT and OT.    7) Received Prevnar 13 today. Recommend Shingrix vaccine.    8)  Follow up in 2 months for medication recheck visit.    Health Maintenance Due   Topic Date Due     HF ACTION PLAN Q3 YR  1955     COPD ACTION PLAN Q1 YR  1955     ZOSTER IMMUNIZATION (1 of 2) 03/16/2003     MICROALBUMIN Q1 YEAR  02/07/2013     This document serves as a record of the services and decisions personally performed and made by Dipak Gordillo MD. It was created on his behalf by Praful Rodriguez, a trained medical scribe. The creation of this document is based on the provider's statements to the medical scribe.  Praful Rodriguez December 4, 2018 12:41 PM     The information in this document, created by the medical scribe for me, accurately reflects the services I personally performed and the decisions made by me. I have reviewed and approved this document for accuracy prior to leaving the patient care area.  December 4, 2018            Dipak Gordillo MD [address]  [address] [phone] Fax

## 2018-12-04 NOTE — PATIENT INSTRUCTIONS
Recommend 2 cap fulls of Miralax every day for a week for constipation management. Recommend Zofran use 30 minutes before PT. Coreg dosage increased to 12.5 mg or 1 tablet twice per day. Received Prevnar 13. Recommend Shingrix vaccine for shingles. Check with insurance to see where the Shingrix vaccine is the cheapest. Follow up 2-6 months after receiving the first shot for the second shot. Discontinue Dantrium.    [facility]                        To reach your care team during and after hours:   [phone]  To reach our pharmacy:        [phone]    Clinic Hours                        Our clinic hours are:    Monday   7:30 am to 7:00 pm                  Tuesday through Friday 7:30 am to 5:00 pm                             Saturday   8:00 am to 12:00 pm      Sunday   Closed      Pharmacy Hours                        Our pharmacy hours are:    Monday   8:30 am to 7:00 pm       Tuesday to Friday  8:30 am to 6:00 pm                       Saturday    9:00 am to 1:00 pm              Sunday    Closed              There is also information available at our web site:  [email or web address]    If your provider ordered any lab tests and you do not receive the results within 10 business days, please call the clinic.    If you need a medication refill please contact your pharmacy.  Please allow 2-3 business days for your refill to be completed.    Our clinic offers telephone visits and e visits.  Please ask one of your team members to explain more.      Use [employer] (secure email communication and access to your chart) to send your primary care provider a message or make an appointment. Ask someone on your Team how to sign up for [employer].  Immunizations                      Immunization History   Administered Date(s) Administered     Influenza (High Dose) 3 valent vaccine 09/04/2018     Influenza Vaccine IM 3yrs+ 4 Valent IIV4 10/28/2017     Pneumococcal 23 valent 11/04/2017     TD (ADULT, 7+) 04/14/1999      TDAP Vaccine (Adacel) 10/08/2018        Health Maintenance                         Health Maintenance Due   Topic Date Due     Heart Failure Action Plan Reviewed Every 3 Years  1955     COPD ACTION PLAN Q1 YR  1955     Microalbumin Lab - yearly  02/07/2013     Pneumococcal Vaccine (1 of 2 - PCV13) 11/04/2018     Depression Assessment - every 6 months  11/15/2018

## 2018-12-06 NOTE — TELEPHONE ENCOUNTER
The bleeding is most likely from anal fissures/hemorrhoids    sennakot S BID  miralax 2 capfuls daily  Witch hazel     To ER if any acute issues.  Let us know how this is working in next 3-4 days

## 2018-12-06 NOTE — TELEPHONE ENCOUNTER
Blood in stool this morning      Duration: noticed blood this morning on stool, bright red blood    Description (location/character/radiation): blood on outside of stool    Intensity:  mild    Accompanying signs and symptoms: Vomited 1 time two days ago, nausea and loss of appetite due to nausea    History (similar episodes/previous evaluation): history of external hemorrhoids    Precipitating or alleviating factors: None    Therapies tried and outcome: Zofran helps per Meghna          Patient does not have fever, denies abdominal pain, history of chronic constipation.  Today was his first BM since Sunday.  They used an enema. BM from today were quite hard. She said harder than usual.    He did eat breakfast this morning okay.  He is drinking.       Meghna can be reached at [phone], okay to leave message.    Patient was just seen on 12/4/2018.    Routing to  to review and advise.      RANJEET Crow, RN, N  [facility]) [phone]

## 2018-12-06 NOTE — TELEPHONE ENCOUNTER
Meghna notified by phone.  She verbalized understanding and agreed with plan.    Merary Kiran, BS, RN, PHN  [facility]) [phone]

## 2018-12-07 NOTE — TELEPHONE ENCOUNTER
"Requested Prescriptions   Pending Prescriptions Disp Refills     famotidine (PEPCID) 20 MG tablet [Pharmacy Med Name: FAMOTIDINE 20 MG TABLET] 90 tablet 1     Sig: TAKE 1 TABLET BY MOUTH EVERY DAY    H2 Blockers Protocol Passed    12/7/2018  1:45 AM       Passed - Patient is age 12 or older       Passed - Recent (12 mo) or future (30 days) visit within the authorizing provider's specialty    Patient had office visit in the last 12 months or has a visit in the next 30 days with authorizing provider or within the authorizing provider's specialty.  See \"Patient Info\" tab in inbasket, or \"Choose Columns\" in Meds & Orders section of the refill encounter.              Prescription approved per [facility] Refill Protocol.  Florence Castro RN  [place] [facility]    "

## 2018-12-09 PROBLEM — I16.0 HYPERTENSIVE URGENCY: Status: ACTIVE | Noted: 2018-01-01

## 2018-12-09 NOTE — ED PROVIDER NOTES
History     Chief Complaint:  Nausea and vomiting    The history is provided by the patient and a relative.      Cricket Miguel is a 63 year old male with a history of stroke, HTN, hyperlipidemia, AI, AR, CHF, cardiomyopathy, and COPD who presents to the emergency department with his wife for evaluation of nausea and vomiting. To note, the patient has had a recent stroke with residual affects. The patient has right sided body weakness and left sided facial droop with his left eye sowed shut. He also lost his hearing in his left ear. He takes a daily aspirin but no other blood thinners. Since June/July, the patient has been intermittently nauseous and has been taking Zofran. This occurs when he is up walking at physical therapy. Due to this, he has had his eyes and ears checked in the past week without significant findings. He also has had medications changes in the past week to help control his blood pressure. In this context, in the past week, the patient had some episodes of vomiting six days ago and five days ago. He was without vomiting until it resumed again today. He also has had intermittent fevers at home, though the thermometer being used may be unreliable. The new vomiting was concerning to son and daughter, prompting the patient and family to seek evaluation here in the emergency department.     Here, family also notes the patient has been less alert and not acting like his self in the past couple of weeks. They are also concerned about his blood pressure as well as a recent cough. The patient denies chest pain, shortness of breath, palpitations, abdominal pain, diarrhea, and dysuria. He is wheelchair bound. No smoking or drinking. Patient resides with his wife.       Allergies:  Baclofen  Lisinopril     Medications:    Albuterol  Aspirin, 81 mg  Lipitor  Carvedilol  Dantrolene  Pepcid  Zofran  Losartan  Prozav    Past Medical History:     Cardiomyopathy  Anemia  COPD  Depression  Psoriasis  Hyperlipidemia  CHF  Aortic regurgitation  Aortic insufficinency  Lung disease  HTN  CVA  Constipation  Sensorineural hearing loss  Inguinal hernia  Heart murmur  Left lung nodule    Past Surgical History:    Left knee surgery  Shoulder replacement  Umbilical hernia repair  Inguinal hernia repair  Aortic valve replacement  Rotator cuff repair  Tracheostomy percutaneous    Family History:    Genetic disorder  Brain cancer  Other cancer  Alzheimer's disease    Social History:  Former smoker: quit date 4/1/2016  Negative for alcohol use.  Patient presents with his son and daughter.  Patient resides at home with son and daughter, daughter is his caretaker  Marital Status:        Review of Systems   Respiratory: Negative for shortness of breath.    Cardiovascular: Negative for chest pain and palpitations.   Gastrointestinal: Positive for nausea and vomiting. Negative for abdominal pain and diarrhea.   Genitourinary: Negative for difficulty urinating, dysuria, frequency and urgency.   All other systems reviewed and are negative.    Physical Exam     Patient Vitals for the past 24 hrs:   BP Temp Temp src Pulse Heart Rate Resp SpO2 Height Weight   12/09/18 2000 (!) 146/109 -- -- -- 67 13 94 % -- --   12/09/18 1945 (!) 150/119 -- -- -- 65 9 96 % -- --   12/09/18 1900 (!) 133/105 -- -- -- 59 12 96 % -- --   12/09/18 1855 117/48 -- -- -- 58 15 95 % -- --   12/09/18 1850 (!) 157/102 -- -- -- 58 12 93 % -- --   12/09/18 1845 (!) 120/92 -- -- -- 57 10 95 % -- --   12/09/18 1840 (!) 147/113 -- -- -- 60 13 95 % -- --   12/09/18 1835 (!) 160/106 -- -- -- 60 14 95 % -- --   12/09/18 1830 (!) 172/123 -- -- -- 56 11 94 % -- --   12/09/18 1825 (!) 162/123 -- -- -- 55 10 96 % -- --   12/09/18 1820 (!) 162/106 -- -- -- 56 13 94 % -- --   12/09/18 1815 (!) 136/98 -- -- -- 55 9 97 % -- --   12/09/18 1810 (!) 139/95 -- -- -- 54 11 96 % -- --   12/09/18 1805 (!) 152/123 -- -- -- 55  "9 94 % -- --   12/09/18 1800 (!) 148/135 -- -- -- 53 12 96 % -- --   12/09/18 1755 (!) 157/109 -- -- -- 57 11 97 % -- --   12/09/18 1750 (!) 215/198 -- -- -- 53 13 96 % -- --   12/09/18 1745 (!) 149/105 -- -- -- 58 9 96 % -- --   12/09/18 1740 (!) 142/100 -- -- -- 53 12 96 % -- --   12/09/18 1735 (!) 147/125 -- -- -- 56 17 95 % -- --   12/09/18 1730 (!) 149/98 -- -- -- 53 (!) 81 97 % -- --   12/09/18 1710 (!) 158/98 -- -- -- 51 8 96 % -- --   12/09/18 1705 (!) 156/94 -- -- -- 58 9 96 % -- --   12/09/18 1700 (!) 140/105 -- -- -- 57 26 95 % -- --   12/09/18 1655 (!) 127/91 -- -- -- 73 13 95 % -- --   12/09/18 1650 (!) 148/106 -- -- -- 70 11 96 % -- --   12/09/18 1645 (!) 153/110 -- -- -- 72 14 95 % -- --   12/09/18 1640 -- -- -- -- 73 12 94 % -- --   12/09/18 1635 -- -- -- -- 70 16 95 % -- --   12/09/18 1630 (!) 161/102 -- -- -- -- -- -- -- --   12/09/18 1605 (!) 159/127 -- -- -- -- -- -- -- --   12/09/18 1508 (!) 209/151 96.8  F (36  C) Oral 91 -- 18 95 % 1.727 m (5' 8\") 82.1 kg (181 lb)       Physical Exam  Constitutional: White male sitting  HENT: No signs of trauma. Left eye sewn partially shut. Right pupil 3 mm and reactive. Oropharynx clear.   Eyes: EOM are normal.   Neck: Normal range of motion. No JVD present. No cervical adenopathy. No bruit.   Cardiovascular: Regular rhythm.  Exam reveals no gallop and no friction rub.    No murmur heard.  Pulmonary/Chest: Bilateral breath sounds normal. No wheezes, rhonchi.  Right base rales. Sternotomy incision.   Abdominal: Soft. No tenderness. No rebound or guarding.   Musculoskeletal: No edema. No tenderness. Right leg splint.   Lymphadenopathy: No lymphadenopathy.   Neurological: Alert and oriented to person, place, and time. Coordination normal.  Dysarthric speech. Left facial droop. Right sided hemiparesis.   Skin: Skin is warm and dry. No rash noted. No erythema.   Emergency Department Course   ECG:  Indication: vomiting  Time: 1627  Vent. Rate 74 bpm. CA interval " 170. QRS duration 80. QT/QTc 434/481. P-R-T axis 36 -40 57. Normal sinus rhythm. Left axis deviation. Read time: 1630.    Imaging:  Radiographic findings were communicated with the patient and family who voiced understanding of the findings.    XR Chest 2 views:   No active alveolar-type infiltrates are identified.  As per radiology.     CT Head without contrast:   1. No acute pathology. No bleed, mass, or acute infarcts are seen.  2. Chronic areas of encephalomalacia are seen in the inferior aspect  of the left cerebellum and in the left parietal lobe. As per radiology.    Laboratory:  CBC: WBC: 10.8, HGB: 15.9, PLT: 279  CMP: Glucose 133 (H), GFR 59 (L), o/w WNL (Creatinine: 1.23)  Lipase: 59 (L)  1609 Troponin: <0.015  Blood cultures x2: In process  BNP: 1656 (H)  UA with micro: ketones 10, blood trace, bacteria few, protein albumin 10, o/w negative  Urine Culture: In process    Interventions:  1624 NS 1L IV  1642 NS 1L IV  1657 Labetalol 20 mg IV  1659 Zofran, 4 mg, IV  1805 Zofran, 4 mg, IV   1921 Zofran, 4 mg, IV   2023 Lasix 20 mg IV   2047 Labetalol 20 mg IV     Emergency Department Course:  1615 Nursing notes and vitals reviewed.  I performed an exam of the patient as documented above.     IV inserted. Medicine administered as documented above. Blood drawn. This was sent to the lab for further testing, results above.    EKG obtained in the ED, see results above.     The patient was sent for a chest xray and head CT while in the emergency department, findings above.     1906 I rechecked the patient and discussed the results of his workup thus far.     2046 I rechecked the patient.     2049  I consulted with Dr. Schultz of the hospitalist services. They are in agreement to accept the patient for admission.    Findings and plan explained to the Patient who consents to admission. Discussed the patient with Dr. Schultz, who will admit the patient to a [facility] bed for further monitoring, evaluation, and  treatment.  Impression & Plan    Medical Decision Making:  Cricket Miguel is a 63 year old male who presents to the ED with his wife for evaluation of nausea and high blood pressure. The patient has a complicated history within the last couple years. He has required aortic valve replacement, aortic arch repair, and aortic valve replaced due to infection. Sometime within this course, he suffered a stroke thought to be embolic and is residual left facial weakness and hemiparesis. He is has been having nausea for some time but it has gotten worse lately and he has been having vomiting with it. He has no abdominal pain, fevers, chills, or urinary symptoms. He has no chest pain, palpitations, or shortness of breath. On arrival, his blood pressure is quite high and on recheck it remains high. Patient had labs obtained. I did hear rales on exam and his BMP is elevated. His chemistry and CBC are not significantly abnormal. Head scan reveals no acute findings and EKG shows no ischemic change. Treating his blood pressure with IV labetalol brought it down in control and helped his nausea; however, overtime, as his blood pressure came up again, his nausea returned. His symptoms seem consistent with hypertension urgency and requires an in hospitalization evaluation. I also think he has some ongoing congestive heart failure. His lasix had been stopped a few weeks ago and he has received a dose here.        Diagnosis:    ICD-10-CM    1. Hypertensive urgencyAcute I16.0    2. Congestive heart failure, unspecified HF chronicity, unspecified heart failure type (H)Chronic I50.9    3. History of stroke  5. History of aortic valve replacement    Disposition:  Admitted to Dr. Schultz      [employer] Disclosure:  I, Greta العراقي, am serving as a scribe on 12/9/2018 at 8:47 PM to personally document services performed by Darwin Collins MD based on my observations and the provider's statements to me.     Greta العراقي  12/9/2018   [facility]  EMERGENCY DEPARTMENT       Darwin Collins MD  12/09/18 [id number]

## 2018-12-10 NOTE — PLAN OF CARE
AOx4, VSS BP fluctuating throughout shift but remains stable and asymptomatic, pt denies CP, SOB, n/v, Espinoza patent w/adequate output, +BM, L facial droop, Bilat weakness R side more so than L, soft touch call light used, ECHO in am and cards consult.

## 2018-12-10 NOTE — TELEPHONE ENCOUNTER
Patient's wife called and left  just wanting RN to give Dr. Ryann SEWELL that patient is currently admitted to [facility]. RN will send to Dr. Garzon as FYI.       Patient is being seen by cardiology currently (most recent note Dr. Lang 12/10/18).

## 2018-12-10 NOTE — PROGRESS NOTES
[facility]    Medicine Progress Note - Hospitalist Service       Date of Admission:  12/9/2018  Assessment & Plan   Cricket Miguel is a 63 year old male admitted on 12/9/2018.     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    Hypertensive Urgency:  Noted BP of 209/151 upon presentation to the ED.  Improved to the 140-150's systolic following administration of IV lasix and IV labetalol.    --Cardiology consult appreciated:  BP improved with addition of amlodipine.  ECHO today and if unchanged can discharge home.      --Resume PTA Coreg 12.5 mg BID and losartan 50 mg BID.    --Start amlodipine 5 mg daily.    --PRN IV hydralazine available.      Nausea and vomiting:  Unclear etiology, could be secondary to hypertensive urgency.  Initial complaints that led to evaluation in the ED.  Has been present for the last couple of days.    --Symptomatic management (Zofran).  --Ordered PRN compazine.    --Check abdominal Xray.      Urinary retention:  Espinoza catheter placed on the floor with 1.2 L output.  --Remove Espinoza cath today; trial of voiding with monitoring of post-void residuals.    --Consider starting flomax.      History of MSSA sepsis with infective carditis and with multiple complications:  Required aortic valve and replacement, septic embolization with residual right-sided paresis, left eye blindness and left facial droop.  He follows with [facility] as outpatient.      Major Depressive Disorder:  --Resume PTA fluoxetine.      HLP:  --Resume PTA atorvastatin.      CKD stage II:  Baseline creatinine 1.1-1.5.    --Monitor while adjusting blood pressure medications.    --Avoid nephrotoxic agents.      GERD:  --Resume  PTA Pepcid.    --PRN Tums ordered per patient request.    --PRN simethicone available.       Diet: Combination Diet Low Saturated Fat Na <2400mg Diet, No Caffeine Diet[unfilled]  DVT Prophylaxis: Pneumatic Compression Devices  Garza Catheter: in place, indication: Retention  Code Status: DNR/DNI[unfilled]    Disposition Plan   Expected discharge: today versus tomorrow, recommended to prior living arrangement once patient passes trial of voiding, ECHO is performed and unchanged and BP's remain stable.  Entered: Walter Douglas PA-C 12/10/2018, 7:24 AM       The patient has been discussed with Dr. Moore, who agrees with the assessment and plan at this time. Dr. Moore will evaluate the patient independently.     UPDATE:  Shortly after evaluation patient took a bite of food and developed heartburn and nausea.  Blood pressure elevated systolic in the 170's.        Walter Douglas PA-C  Hospitalist Service  [facility]    ______________________________________________________________________    Interval History   Patient resting in bed upon arrival with wife at the bed side.  He denied fever, chills, chest pain, shortness of breath, abdominal pain.  He denied any pain or head ache.      He denied nausea currently and a breakfast tray was delivered.      His wife was concerned about the diastolic blood pressure as she was told it should remain at 80 or less per Dr. Garzon (currently 95).  She mentioned he has had decrease in urine.  They have a plan in place regarding constipation.      Reviewed plan to continue with amlodipine, ECHO later today and removal of garza cath.  Discussed possible initiation of Flomax.      Data reviewed today: I reviewed all medications, new labs and imaging results over the last 24 hours. I personally reviewed the chest x-ray image(s) showing no pulmonary congestion and appears clear.    Physical Exam   Vital Signs: Temp: 97.9  F (36.6  C) Temp src: Axillary  BP: (!) 126/95 Pulse: 78 Heart Rate: 65 Resp: 17 SpO2: (!) 83 % O2 Device: None (Room air)    Weight: 179 lbs 7.27 oz    Constitutional: Awake, alert, cooperative, no apparent distress.    ENT: Normocephalic, atraumatic, left eye sutured shut, left sided facial droop, oral pharynx with moist mucus membranes, tonsils without erythema or exudates.     Neck: Supple, symmetrical, trachea midline, no adenopathy.  Pulmonary: No increased work of breathing, good air exchange, clear to auscultation bilaterally, no crackles or wheezing.  Cardiovascular: Regular rate and rhythm, normal S1 and S2, no S3 or S4, and no murmur noted.  GI: Normal bowel sounds, soft, non-distended, non-tender.    Skin/Integumen: Clear.  Neuro: CN II-XII grossly intact.  Left facial droop unable to move right side.  Left upper extremity tremulous but strength is intact.    Psych:  Alert and oriented x 3 (Answer he is in a hospital in [place], able to state his name and date of birth and who the president is). Normal affect.  Extremities: No lower extremity edema noted, and non-TTP bilaterally.   : Espinoza catheter in place with clear yellow urine in the bag.        Data   Recent Labs   Lab 12/10/18  0538 12/09/18  1609   WBC 10.0 10.8   HGB 15.0 15.9   MCV 86 87    279    140   POTASSIUM 4.0 4.1   CHLORIDE 104 102   CO2 28 32   BUN 20 16   CR 1.38* 1.23   ANIONGAP 8 6   IZABELLA 9.3 9.5   * 133*   ALBUMIN  --  3.9   PROTTOTAL  --  8.3   BILITOTAL  --  0.7   ALKPHOS  --  124   ALT  --  24   AST  --  17   LIPASE  --  59*   TROPI  --  <0.015     Recent Results (from the past 24 hour(s))   XR Chest 2 Views    Narrative    XR CHEST 2 VW   12/9/2018 5:21 PM     HISTORY: rales right side; rales right side    COMPARISON: None.    FINDINGS: The patient is status post a midline sternotomy.  Small  bilateral calcified granulomas are noted. No active infiltrate.  The  bones and soft tissues are unremarkable.      Impression    IMPRESSION: No  active alveolar-type infiltrates are identified.        FAZAL BURTON MD   Head CT w/o contrast    Narrative    CT SCAN OF THE HEAD WITHOUT CONTRAST   12/9/2018 8:15 PM     HISTORY: Headache, acute, normal neuro exam; persistent nausea;  persistent nausea    TECHNIQUE:  Axial images of the head and coronal reformations without  IV contrast material. Radiation dose for this scan was reduced using  automated exposure control, adjustment of the mA and/or kV according  to patient size, or iterative reconstruction technique.    COMPARISON: MR scan dated 10/23/2017.    FINDINGS: There is diffuse parenchymal volume loss.  White matter  changes are present in the cerebral hemispheres that are consistent  with small vessel ischemic disease in this age patient.   Encephalomalacia seen in the inferior aspect of the left cerebellum.  This corresponds to the infarct seen on the previous MR scan. There is  also a chronic area of encephalomalacia in the left parietal lobe.  There is no evidence of intracranial hemorrhage, mass, acute infarct  or anomaly. The visualized portions of the sinuses and mastoids appear  normal. There is no evidence of trauma.      Impression    IMPRESSION:   1. No acute pathology. No bleed, mass, or acute infarcts are seen.  2. Chronic areas of encephalomalacia are seen in the inferior aspect  of the left cerebellum and in the left parietal lobe.      FAZAL BURTON MD     Medications       amLODIPine  5 mg Oral Daily     aspirin  81 mg Oral or Feeding Tube Daily     atorvastatin  40 mg Oral Daily     carvedilol  12.5 mg Oral BID     famotidine  20 mg Oral Daily     FLUoxetine  20 mg Oral Daily     losartan  50 mg Oral BID

## 2018-12-10 NOTE — ED NOTES
Patient unable to urinate for urine specimen. Patient refused catheter. Patient encouraged to urinate.

## 2018-12-10 NOTE — H&P
"Cardiology Consultation      Cricket Miguel MRN# [id number]   YOB: 1955 Age: 63 year old   Date of Admission: 2018     Reason for consult:            Assessment and Plan:   Active Problems:    Hypertensive urgency    Assessment:     Plan:        1. Hypertensive Urgency    Suspect secondary to nausea    Well controlled with amlodipine, coreg, and losartan      2. CM    With a history of CM will recheck echo    If unchanged ok to discharge.      Follow up with Dr. Garzon already scheduled                     Chief Complaint:   Nausea & Vomiting (N/V for 1 week. Also concerned about BP.)           History of Present Illness:   Refer to dictation         Physical Exam:   Vitals were reviewed  Blood pressure (!) 126/95, pulse 78, temperature 98.2  F (36.8  C), temperature source Axillary, resp. rate 15, height 1.727 m (5' 8\"), weight 81.4 kg (179 lb 7.3 oz), SpO2 (!) 83 %.  Temperatures:  Current - Temp: 98.2  F (36.8  C); Max - Temp  Av.9  F (36.6  C)  Min: 96.8  F (36  C)  Max: 98.6  F (37  C)  Respiration range: Resp  Av.6  Min: 7  Max: 81  Pulse range: Pulse  Av.5  Min: 78  Max: 91  Blood pressure range: Systolic (24hrs), Av , Min:89 , Max:215   ; Diastolic (24hrs), Av, Min:48, Max:198    Pulse oximetry range: SpO2  Av.4 %  Min: 83 %  Max: 100 %    Intake/Output Summary (Last 24 hours) at 12/10/2018 0949  Last data filed at 12/10/2018 0610  Gross per 24 hour   Intake 1000 ml   Output 1100 ml   Net -100 ml     Constitutional:   awake, alert, cooperative, no apparent distress, and appears stated age     Eyes:   Lids and lashes normal, pupils equal, round and reactive to light, extra ocular muscles intact, sclera clear, conjunctiva normal     Neck:   supple, symmetrical, trachea midline, no JVD     Back:   symmetric     Lungs:   No increased work of breathing, good air exchange, clear to auscultation bilaterally, no crackles or wheezing  clear to auscultation "     Cardiovascular:   Normal apical impulse, regular rate and rhythm, normal S1 and S2, no S3 or S4, and no murmur noted.   , , , pulses 2 plus all extermities bilaterally  carotids without bruits bilaterally     Abdomen:   non-tender     Musculoskeletal:   motor strength is 5 out of 5 all extremities bilaterally     Neurologic:   Grossly nonfocal     Skin:   no bruising or bleeding     Additional findings:   Edema   No edema               Past Medical History:   I have reviewed this patient's past medical history  Past Medical History:   Diagnosis Date     Abscess of aortic root 10/06/2017     AI (aortic insufficiency)     severe     Anxiety      Aortic root dilatation (H)      AR (aortic regurgitation)     severe     Cardiomyopathy (H)      CHF (congestive heart failure), NYHA class II (H)      Constipation     Dr Mcghee     COPD, mild (H)     spirometry 12/16     CVA (cerebral vascular accident) (H) 10/06/2017    septic emboli - MSSA - cerebellum, Left MCA, Rt Occipital, Aphasia, Left facial paralysis, Right>Left UE/LE weakness, Left visual field cut, moderate to severe encephalopathy, cognitive dysfunction, word finding     Depression 10/06/2017     Elevated PSA 05/2018    PSA = 8, Dr Garcia     Heart murmur      Hyperlipidemia LDL goal <70 10/31/2010     Hypertension goal BP (blood pressure) < 140/90      Inguinal hernia      left lung nodule  1/29/2008     Major depressive disorder, single episode, moderate (H)      Psoriasis childhood     SNHL (sensorineural hearing loss)     Left, secondary to CVA     Tobacco use disorder              Past Surgical History:   I have reviewed this patient's past surgical history  Past Surgical History:   Procedure Laterality Date     ARTHROSCOPY KNEE INCISION AND DRAINAGE Left 10/8/2017    Arthroscopic Incision and Drainage of Left Knee - Dr Munoz     HC SHOULDER ARTHROSCOPY, DX  2001    Arthroscopy, Shoulder RT Dr OSIRIS Andersen     HC SHOULDER ARTHROSCOPY, DX  1/04    LT  arthroscopy Dr OSIRIS Andersen     HERNIA REPAIR, UMBILICAL      incarcerated - dr rockwell     HERNIORRHAPHY INGUINAL  2012    HERNIORRHAPHY INGUINAL;  Right Inguinal Hernia Repair with mesh ;  Surgeon: Mello Rockwell MD;  Location: [facility] OR     REPAIR ANEURYSM ASCENDING AORTA N/A 2017    REPAIR ANEURYSM ASCENDING AORTA;  REDO Median STERNOTOMY, FEMORAL CANNULATION, Lysis of adhesions, AORTIC ROOT VALVE HOMOGRAFT with 26mm x 7.0cm Cry[employer]ife Aortic valve and conduit - Dr Bowers     REPLACE VALVE AORTIC N/A 2016    REPLACE VALVE AORTIC - Dr Bowers     ROTATOR CUFF REPAIR RT/LT  11/10    Rt arthroscopy - Dr OSIRIS Andersen     SURGICAL HISTORY OF -       AVR, severe AR, aortic root repair     TRACHEOSTOMY PERCUTANEOUS  10/27/2017                    Social History:   I have reviewed this patient's social history  Social History     Tobacco Use     Smoking status: Former Smoker     Packs/day: 1.00     Years: 35.00     Pack years: 35.00     Types: Cigarettes     Last attempt to quit: 2016     Years since quittin.6     Smokeless tobacco: Never Used     Tobacco comment: 2016   Substance Use Topics     Alcohol use: Yes     Alcohol/week: 0.0 oz     Comment: 1 drink per month,maybe             Family History:   I have reviewed this patient's family history  Family History   Problem Relation Age of Onset     Genetic Disorder Mother         Bone plateover growth Agustin. shoulder surgeries     Cancer Mother         brain cancer     Other Cancer Mother      Alzheimer Disease Father              Allergies:     Allergies   Allergen Reactions     Baclofen Nausea and Vomiting     Loss of bladder control     Lisinopril Swelling     One-sided facial swelling after being on lisinopril/HCTZ for one week.              Medications:   I have reviewed this patient's current medications  Medications Prior to Admission   Medication Sig Dispense Refill Last Dose     albuterol (PROAIR HFA/PROVENTIL HFA/VENTOLIN HFA) 108 (90  Base) MCG/ACT inhaler Inhale 2 puffs into the lungs every 6 hours as needed for shortness of breath / dyspnea or wheezing 1 Inhaler 3 prn     aspirin 81 MG chewable tablet 1 tablet (81 mg) by Oral or Feeding Tube route daily 60 tablet 1 12/9/2018     atorvastatin (LIPITOR) 40 MG tablet Take 1 tablet (40 mg) by mouth daily 60 tablet 1 12/9/2018     Carboxymethylcellulose Sod PF (REFRESH PLUS) 0.5 % SOLN ophthalmic solution Place 1 drop Into the left eye 3 times daily as needed (to flush debris from eye) 1 Bottle 1 prn     carvedilol (COREG) 12.5 MG tablet Take 1 tablet (12.5 mg) by mouth 2 times daily 180 tablet 3 12/9/2018 at x2 doses      famotidine (PEPCID) 20 MG tablet TAKE 1 TABLET BY MOUTH EVERY DAY 90 tablet 1 12/9/2018     FLUoxetine (PROZAC) 20 MG capsule Take 1 capsule (20 mg) by mouth daily 90 capsule 3 12/9/2018     losartan (COZAAR) 50 MG tablet Take 1 tablet (50 mg) by mouth 2 times daily 180 tablet 1 12/9/2018 at x2 doses     multivitamin, therapeutic with minerals (THERA-VIT-M) TABS tablet Take 1 tablet by mouth daily 30 each 0 12/9/2018     ondansetron (ZOFRAN) 4 MG tablet Take 1-2 tablets (4-8mg) every 8 hours as needed for nausea 20 tablet 0 prn     Current Facility-Administered Medications Ordered in Epic   Medication Dose Route Frequency Last Rate Last Dose     acetaminophen (TYLENOL) tablet 650 mg  650 mg Oral Q4H PRN         amLODIPine (NORVASC) tablet 5 mg  5 mg Oral Daily   5 mg at 12/10/18 0913     aspirin (ASA) chewable tablet 81 mg  81 mg Oral or Feeding Tube Daily   81 mg at 12/10/18 0913     atorvastatin (LIPITOR) tablet 40 mg  40 mg Oral Daily   40 mg at 12/10/18 0912     carvedilol (COREG) tablet 12.5 mg  12.5 mg Oral BID   12.5 mg at 12/10/18 0912     diphenhydrAMINE (BENADRYL) 25 mg, acetaminophen (TYLENOL) 325 mg alternative for Tylenol PM   Oral At Bedtime PRN         famotidine (PEPCID) tablet 20 mg  20 mg Oral Daily   20 mg at 12/10/18 0912     FLUoxetine (PROzac) capsule 20 mg   20 mg Oral Daily   20 mg at 12/10/18 0913     hydrALAZINE (APRESOLINE) injection 10 mg  10 mg Intravenous Q4H PRN         hypromellose-dextran (ARTIFICAL TEARS) 0.1-0.3 % ophthalmic solution 1 drop  1 drop Left Eye TID PRN         losartan (COZAAR) tablet 50 mg  50 mg Oral BID   50 mg at 12/10/18 0912     magnesium hydroxide (MILK OF MAGNESIA) suspension 30 mL  30 mL Oral Daily PRN         melatonin tablet 1 mg  1 mg Oral At Bedtime PRN         naloxone (NARCAN) injection 0.1-0.4 mg  0.1-0.4 mg Intravenous Q2 Min PRN         ondansetron (ZOFRAN-ODT) ODT tab 4 mg  4 mg Oral Q6H PRN        Or     ondansetron (ZOFRAN) injection 4 mg  4 mg Intravenous Q6H PRN   4 mg at 12/09/18 2330     senna-docusate (SENOKOT-S/PERICOLACE) 8.6-50 MG per tablet 1 tablet  1 tablet Oral BID PRN        Or     senna-docusate (SENOKOT-S/PERICOLACE) 8.6-50 MG per tablet 2 tablet  2 tablet Oral BID PRN         No current [facility]-ordered outpatient medications on file.             Review of Systems:   The 10 point Review of Systems is negative other than noted in the HPI            Data:   All laboratory data reviewed  Results for orders placed or performed during the hospital encounter of 12/09/18 (from the past 24 hour(s))   CBC with platelets differential   Result Value Ref Range    WBC 10.8 4.0 - 11.0 10e9/L    RBC Count 5.45 4.4 - 5.9 10e12/L    Hemoglobin 15.9 13.3 - 17.7 g/dL    Hematocrit 47.3 40.0 - 53.0 %    MCV 87 78 - 100 fl    MCH 29.2 26.5 - 33.0 pg    MCHC 33.6 31.5 - 36.5 g/dL    RDW 13.8 10.0 - 15.0 %    Platelet Count 279 150 - 450 10e9/L    Diff Method Automated Method     % Neutrophils 83.5 %    % Lymphocytes 8.5 %    % Monocytes 5.8 %    % Eosinophils 1.8 %    % Basophils 0.2 %    % Immature Granulocytes 0.2 %    Nucleated RBCs 0 0 /100    Absolute Neutrophil 9.0 (H) 1.6 - 8.3 10e9/L    Absolute Lymphocytes 0.9 0.8 - 5.3 10e9/L    Absolute Monocytes 0.6 0.0 - 1.3 10e9/L    Absolute Eosinophils 0.2 0.0 - 0.7 10e9/L    Absolute  Basophils 0.0 0.0 - 0.2 10e9/L    Abs Immature Granulocytes 0.0 0 - 0.4 10e9/L    Absolute Nucleated RBC 0.0    Comprehensive metabolic panel   Result Value Ref Range    Sodium 140 133 - 144 mmol/L    Potassium 4.1 3.4 - 5.3 mmol/L    Chloride 102 94 - 109 mmol/L    Carbon Dioxide 32 20 - 32 mmol/L    Anion Gap 6 3 - 14 mmol/L    Glucose 133 (H) 70 - 99 mg/dL    Urea Nitrogen 16 7 - 30 mg/dL    Creatinine 1.23 0.66 - 1.25 mg/dL    GFR Estimate 59 (L) >60 mL/min/1.7m2    GFR Estimate If Black 71 >60 mL/min/1.7m2    Calcium 9.5 8.5 - 10.1 mg/dL    Bilirubin Total 0.7 0.2 - 1.3 mg/dL    Albumin 3.9 3.4 - 5.0 g/dL    Protein Total 8.3 6.8 - 8.8 g/dL    Alkaline Phosphatase 124 40 - 150 U/L    ALT 24 0 - 70 U/L    AST 17 0 - 45 U/L   Lipase   Result Value Ref Range    Lipase 59 (L) 73 - 393 U/L   Nt probnp inpatient (BNP)   Result Value Ref Range    N-Terminal Pro BNP Inpatient 1,656 (H) 0 - 900 pg/mL   Troponin I   Result Value Ref Range    Troponin I ES <0.015 0.000 - 0.045 ug/L   EKG 12 lead   Result Value Ref Range    Interpretation ECG Click View Image link to view waveform and result    Blood culture   Result Value Ref Range    Specimen Description Blood Right Arm     Special Requests Aerobic and anaerobic bottles received     Culture Micro No growth after 10 hours    Blood culture   Result Value Ref Range    Specimen Description Blood Left Arm     Special Requests Aerobic and anaerobic bottles received     Culture Micro No growth after 10 hours    XR Chest 2 Views    Narrative    XR CHEST 2 VW   12/9/2018 5:21 PM     HISTORY: rales right side; rales right side    COMPARISON: None.    FINDINGS: The patient is status post a midline sternotomy.  Small  bilateral calcified granulomas are noted. No active infiltrate.  The  bones and soft tissues are unremarkable.      Impression    IMPRESSION: No active alveolar-type infiltrates are identified.        FAZAL BURTON MD   Head CT w/o contrast    Narrative    CT SCAN OF THE  HEAD WITHOUT CONTRAST   12/9/2018 8:15 PM     HISTORY: Headache, acute, normal neuro exam; persistent nausea;  persistent nausea    TECHNIQUE:  Axial images of the head and coronal reformations without  IV contrast material. Radiation dose for this scan was reduced using  automated exposure control, adjustment of the mA and/or kV according  to patient size, or iterative reconstruction technique.    COMPARISON: MR scan dated 10/23/2017.    FINDINGS: There is diffuse parenchymal volume loss.  White matter  changes are present in the cerebral hemispheres that are consistent  with small vessel ischemic disease in this age patient.   Encephalomalacia seen in the inferior aspect of the left cerebellum.  This corresponds to the infarct seen on the previous MR scan. There is  also a chronic area of encephalomalacia in the left parietal lobe.  There is no evidence of intracranial hemorrhage, mass, acute infarct  or anomaly. The visualized portions of the sinuses and mastoids appear  normal. There is no evidence of trauma.      Impression    IMPRESSION:   1. No acute pathology. No bleed, mass, or acute infarcts are seen.  2. Chronic areas of encephalomalacia are seen in the inferior aspect  of the left cerebellum and in the left parietal lobe.      FAZAL BURTON MD   UA reflex to Microscopic and Culture   Result Value Ref Range    Color Urine Light Yellow     Appearance Urine Clear     Glucose Urine Negative NEG^Negative mg/dL    Bilirubin Urine Negative NEG^Negative    Ketones Urine 10 (A) NEG^Negative mg/dL    Specific Gravity Urine 1.012 1.003 - 1.035    Blood Urine Trace (A) NEG^Negative    pH Urine 6.5 5.0 - 7.0 pH    Protein Albumin Urine 10 (A) NEG^Negative mg/dL    Urobilinogen mg/dL Normal 0.0 - 2.0 mg/dL    Nitrite Urine Negative NEG^Negative    Leukocyte Esterase Urine Negative NEG^Negative    Source Midstream Urine     RBC Urine 1 0 - 2 /HPF    WBC Urine 1 0 - 5 /HPF    Bacteria Urine Few (A) NEG^Negative /HPF     Squamous Epithelial /HPF Urine <1 0 - 1 /HPF   Basic metabolic panel   Result Value Ref Range    Sodium 140 133 - 144 mmol/L    Potassium 4.0 3.4 - 5.3 mmol/L    Chloride 104 94 - 109 mmol/L    Carbon Dioxide 28 20 - 32 mmol/L    Anion Gap 8 3 - 14 mmol/L    Glucose 106 (H) 70 - 99 mg/dL    Urea Nitrogen 20 7 - 30 mg/dL    Creatinine 1.38 (H) 0.66 - 1.25 mg/dL    GFR Estimate 52 (L) >60 mL/min/1.7m2    GFR Estimate If Black 62 >60 mL/min/1.7m2    Calcium 9.3 8.5 - 10.1 mg/dL   CBC with platelets   Result Value Ref Range    WBC 10.0 4.0 - 11.0 10e9/L    RBC Count 5.13 4.4 - 5.9 10e12/L    Hemoglobin 15.0 13.3 - 17.7 g/dL    Hematocrit 44.2 40.0 - 53.0 %    MCV 86 78 - 100 fl    MCH 29.2 26.5 - 33.0 pg    MCHC 33.9 31.5 - 36.5 g/dL    RDW 13.9 10.0 - 15.0 %    Platelet Count 262 150 - 450 10e9/L     EKG results:   I have reviewed this patient's EKG with the following interpretation:        Normal sinus rhythm, normal axis, no acute ischemic ST segment or T wave changes     Echocardiology:   pending        Results:

## 2018-12-10 NOTE — ED NOTES
"[facility]  ED Nurse Handoff Report    ED Chief complaint: Nausea & Vomiting (N/V for 1 week. Also concerned about BP.)      ED Diagnosis:   Final diagnoses:   Hypertensive urgency   Congestive heart failure, unspecified HF chronicity, unspecified heart failure type (H)       Code Status: Full Code    Allergies:   Allergies   Allergen Reactions     Baclofen Nausea and Vomiting     Loss of bladder control     Lisinopril Swelling     One-sided facial swelling after being on lisinopril/HCTZ for one week.        Activity level - Baseline/Home:  Stand with Assist    Activity Level - Current:   Stand with Assist of 2     Needed?: No    Isolation: No  Infection: Not Applicable  Bariatric?: No    Vital Signs:   Vitals:    12/09/18 1900 12/09/18 1945 12/09/18 2000 12/09/18 2030   BP: (!) 133/105 (!) 150/119 (!) 146/109 (!) 174/130   Pulse:       Resp: 12 9 13 17   Temp:       TempSrc:       SpO2: 96% 96% 94% 94%   Weight:       Height:           Cardiac Rhythm: ,        Pain level:      Is this patient confused?: No   Does this patient have a guardian?  No         If yes, is there guardianship documents in the Epic \"Code/ACP\" activity?  N/A         Guardian Notified?  N/A  [place] - Suicide Severity Rating Scale Completed?  Yes  If yes, what color did the patient score?  White    Patient Report: Initial Complaint: Nausea, Vomiting  Focused Assessment:   Neuro: WDL  Cardiac: Hypertension, last /111 with Lasix and Labetolol dose  Respiratory: WDL  GI: Nausea, Vomiting (prior to arrival), Abdominal pain (generalized)  : Unable to/difficulty urinating, refused straight cath, refused bladder scan  Skin/Musculoskeletal: WDL  Tests Performed:   Results for orders placed or performed during the hospital encounter of 12/09/18   XR Chest 2 Views    Narrative    XR CHEST 2 VW   12/9/2018 5:21 PM     HISTORY: rales right side; rales right side    COMPARISON: None.    FINDINGS: The patient is status " post a midline sternotomy.  Small  bilateral calcified granulomas are noted. No active infiltrate.  The  bones and soft tissues are unremarkable.      Impression    IMPRESSION: No active alveolar-type infiltrates are identified.        FAZAL BURTON MD   Head CT w/o contrast    Narrative    CT SCAN OF THE HEAD WITHOUT CONTRAST   12/9/2018 8:15 PM     HISTORY: Headache, acute, normal neuro exam; persistent nausea;  persistent nausea    TECHNIQUE:  Axial images of the head and coronal reformations without  IV contrast material. Radiation dose for this scan was reduced using  automated exposure control, adjustment of the mA and/or kV according  to patient size, or iterative reconstruction technique.    COMPARISON: MR scan dated 10/23/2017.    FINDINGS: There is diffuse parenchymal volume loss.  White matter  changes are present in the cerebral hemispheres that are consistent  with small vessel ischemic disease in this age patient.   Encephalomalacia seen in the inferior aspect of the left cerebellum.  This corresponds to the infarct seen on the previous MR scan. There is  also a chronic area of encephalomalacia in the left parietal lobe.  There is no evidence of intracranial hemorrhage, mass, acute infarct  or anomaly. The visualized portions of the sinuses and mastoids appear  normal. There is no evidence of trauma.      Impression    IMPRESSION:   1. No acute pathology. No bleed, mass, or acute infarcts are seen.  2. Chronic areas of encephalomalacia are seen in the inferior aspect  of the left cerebellum and in the left parietal lobe.      FAZAL BURTON MD   CBC with platelets differential   Result Value Ref Range    WBC 10.8 4.0 - 11.0 10e9/L    RBC Count 5.45 4.4 - 5.9 10e12/L    Hemoglobin 15.9 13.3 - 17.7 g/dL    Hematocrit 47.3 40.0 - 53.0 %    MCV 87 78 - 100 fl    MCH 29.2 26.5 - 33.0 pg    MCHC 33.6 31.5 - 36.5 g/dL    RDW 13.8 10.0 - 15.0 %    Platelet Count 279 150 - 450 10e9/L    Diff Method Automated Method      % Neutrophils 83.5 %    % Lymphocytes 8.5 %    % Monocytes 5.8 %    % Eosinophils 1.8 %    % Basophils 0.2 %    % Immature Granulocytes 0.2 %    Nucleated RBCs 0 0 /100    Absolute Neutrophil 9.0 (H) 1.6 - 8.3 10e9/L    Absolute Lymphocytes 0.9 0.8 - 5.3 10e9/L    Absolute Monocytes 0.6 0.0 - 1.3 10e9/L    Absolute Eosinophils 0.2 0.0 - 0.7 10e9/L    Absolute Basophils 0.0 0.0 - 0.2 10e9/L    Abs Immature Granulocytes 0.0 0 - 0.4 10e9/L    Absolute Nucleated RBC 0.0    Comprehensive metabolic panel   Result Value Ref Range    Sodium 140 133 - 144 mmol/L    Potassium 4.1 3.4 - 5.3 mmol/L    Chloride 102 94 - 109 mmol/L    Carbon Dioxide 32 20 - 32 mmol/L    Anion Gap 6 3 - 14 mmol/L    Glucose 133 (H) 70 - 99 mg/dL    Urea Nitrogen 16 7 - 30 mg/dL    Creatinine 1.23 0.66 - 1.25 mg/dL    GFR Estimate 59 (L) >60 mL/min/1.7m2    GFR Estimate If Black 71 >60 mL/min/1.7m2    Calcium 9.5 8.5 - 10.1 mg/dL    Bilirubin Total 0.7 0.2 - 1.3 mg/dL    Albumin 3.9 3.4 - 5.0 g/dL    Protein Total 8.3 6.8 - 8.8 g/dL    Alkaline Phosphatase 124 40 - 150 U/L    ALT 24 0 - 70 U/L    AST 17 0 - 45 U/L   Lipase   Result Value Ref Range    Lipase 59 (L) 73 - 393 U/L   Nt probnp inpatient (BNP)   Result Value Ref Range    N-Terminal Pro BNP Inpatient 1,656 (H) 0 - 900 pg/mL   UA reflex to Microscopic and Culture   Result Value Ref Range    Color Urine Light Yellow     Appearance Urine Clear     Glucose Urine Negative NEG^Negative mg/dL    Bilirubin Urine Negative NEG^Negative    Ketones Urine 10 (A) NEG^Negative mg/dL    Specific Gravity Urine 1.012 1.003 - 1.035    Blood Urine Trace (A) NEG^Negative    pH Urine 6.5 5.0 - 7.0 pH    Protein Albumin Urine 10 (A) NEG^Negative mg/dL    Urobilinogen mg/dL Normal 0.0 - 2.0 mg/dL    Nitrite Urine Negative NEG^Negative    Leukocyte Esterase Urine Negative NEG^Negative    Source Midstream Urine     RBC Urine 1 0 - 2 /HPF    WBC Urine 1 0 - 5 /HPF    Bacteria Urine Few (A) NEG^Negative /HPF     Squamous Epithelial /HPF Urine <1 0 - 1 /HPF   Troponin I   Result Value Ref Range    Troponin I ES <0.015 0.000 - 0.045 ug/L   EKG 12 lead   Result Value Ref Range    Interpretation ECG Click View Image link to view waveform and result    Blood culture   Result Value Ref Range    Specimen Description Blood Left Arm     Special Requests Aerobic and anaerobic bottles received     Culture Micro No growth after 2 hours    Blood culture   Result Value Ref Range    Specimen Description Blood Right Arm     Special Requests Aerobic and anaerobic bottles received     Culture Micro No growth after 2 hours        Abnormal Results: None  Treatments provided: Zofran 4mg IV x2, Lasix 20mg IV, Labetolol 20mg IV    Family Comments: Wife at bedside    OBS brochure/video discussed/provided to patient/family: N/A              Name of person given brochure if not patient: NA              Relationship to patient: NA    ED Medications:   Medications   0.9% sodium chloride BOLUS (0 mLs Intravenous Stopped 12/9/18 1722)     Followed by   sodium chloride 0.9% infusion ( Intravenous Canceled Entry 12/9/18 1925)   ondansetron (ZOFRAN) injection 4 mg (4 mg Intravenous Given 12/9/18 1921)   labetalol (NORMODYNE/TRANDATE) injection 20 mg (20 mg Intravenous Given 12/9/18 1657)   furosemide (LASIX) injection 20 mg (20 mg Intravenous Given 12/9/18 2023)   labetalol (NORMODYNE/TRANDATE) injection 20 mg (20 mg Intravenous Given 12/9/18 2047)       Drips infusing?:  No    For the majority of the shift this patient was Green.   Interventions performed were NA.    Severe Sepsis OR Septic Shock Diagnosis Present: No    To be done/followed up on inpatient unit:  None    ED NURSE PHONE NUMBER: *[phone]

## 2018-12-10 NOTE — UTILIZATION REVIEW
"Admission Status; Secondary Review Determination     Under the authority of the Utilization Management Committee, the utilization review process indicated a secondary review on the above patient.  The review outcome is based on review of the medical records, discussions with staff, and applying clinical experience noted on the date of the review.       (x) Observation Status Appropriate - This patient does not meet hospital inpatient criteria and is placed in observation status. If this patient's primary payer is Medicare and was admitted as an inpatient, Condition Code 44 should be used and patient status changed to \"observation\".     RATIONALE FOR DETERMINATION: 63-year-old male with significant spastic hemiplegia after a septic embolic stroke in 2016, history of a cardiomyopathy with eventual normalization of his ejection fraction, hypertension and history of recurrent nausea the past 5 months.  Patient now presents with an exacerbation of his nausea with intermittent vomiting with resultant marked hypertension.  Initially no signs of significant bowel obstruction, acute CNS changes, or marked JOSE.  Observation care would be appropriate for initial intravenous fluids and IV blood pressure medicines as needed along with antiemetics until patient able to tolerate oral diet.  If patient persists to have refractive nausea and vomiting and unable to manage his oral medications, then at that point patient would be appropriate to advance to inpatient services.       The severity of illness, intensity of service provided, expected LOS and risk for adverse outcome make the care appropriate for further observation; however, doesn't meet criteria for hospital inpatient admission. This was discussed with attending physician who concurred with this determination.    The information on this document is developed by the utilization review team in order for the business office to ensure compliance.  This only denotes the " appropriateness of proper admission status and does not reflect the quality of care rendered.         The definitions of Inpatient Status and Observation Status used in making the determination above are those provided in the CMS Coverage Manual, Chapter 1 and Chapter 6, section 70.4.      Sincerely,     Rah Khoury MD    Physician Advisor  Utilization Review/ Case Management  [facility].

## 2018-12-10 NOTE — PHARMACY-ADMISSION MEDICATION HISTORY
Admission medication history interview status for the 12/9/2018  admission is complete. See EPIC admission navigator for prior to admission medications     Medication history source reliability:Good    Actions taken by pharmacist (provider contacted, etc): Verified medications with family member.      Additional medication history information not noted on PTA med list :None    Medication reconciliation/reorder completed by provider prior to medication history? No    Time spent in this activity: 10 minutes    Prior to Admission medications    Medication Sig Last Dose Taking? Auth Provider   albuterol (PROAIR HFA/PROVENTIL HFA/VENTOLIN HFA) 108 (90 Base) MCG/ACT inhaler Inhale 2 puffs into the lungs every 6 hours as needed for shortness of breath / dyspnea or wheezing prn Yes Duy Garcia DO   aspirin 81 MG chewable tablet 1 tablet (81 mg) by Oral or Feeding Tube route daily 12/9/2018 Yes Florence Juan PA-C   atorvastatin (LIPITOR) 40 MG tablet Take 1 tablet (40 mg) by mouth daily 12/9/2018 Yes Florence Juan PA-C   Carboxymethylcellulose Sod PF (REFRESH PLUS) 0.5 % SOLN ophthalmic solution Place 1 drop Into the left eye 3 times daily as needed (to flush debris from eye) prn Yes Florence Juan PA-C   carvedilol (COREG) 12.5 MG tablet Take 1 tablet (12.5 mg) by mouth 2 times daily 12/9/2018 at x2 doses  Yes Dipak Gordillo MD   famotidine (PEPCID) 20 MG tablet TAKE 1 TABLET BY MOUTH EVERY DAY 12/9/2018 Yes Dipak Gordillo MD   FLUoxetine (PROZAC) 20 MG capsule Take 1 capsule (20 mg) by mouth daily 12/9/2018 Yes Dipak Gordillo MD   losartan (COZAAR) 50 MG tablet Take 1 tablet (50 mg) by mouth 2 times daily 12/9/2018 at x2 doses Yes Candice Olivera APRN CNP   multivitamin, therapeutic with minerals (THERA-VIT-M) TABS tablet Take 1 tablet by mouth daily 12/9/2018 Yes Florence Juan PA-C   ondansetron (ZOFRAN) 4 MG tablet Take 1-2 tablets (4-8mg) every 8 hours as needed for nausea prn Yes Dipak Gordillo MD

## 2018-12-10 NOTE — PROGRESS NOTES
Service Date: 12/10/2018      HISTORY OF PRESENT ILLNESS:  Mr. Miguel is a 63-year-old brought in by his wife today for intermittent episodes of nausea and hypertension.  He has a complex medical history including Staph sepsis in 2016, complicated by septic CVA, with a subsequent unfortunate resulting spastic hemiplegia, history of cardiomyopathy, COPD, depression, hypertension, hyperlipidemia, chronic kidney disease.  He has been having increasing nausea, vomiting, stomach discomfort and came to the emergency room.  He has also been noting that his blood pressure has been elevated and has been, per his wife who is his primary caregiver, some decreased mentation.  Because of this, over the past couple of months they have discontinued a lot of his antianxiety medications.  He has had no chest pain or shortness of breath.  Upon my history, he appears lucid and oriented.  His blood pressure in the emergency room was apparently in the 140s to 170s; however, apparently reportedly at home as high as 209/151.  He was admitted to the [facility] and amlodipine was added to his medical regimen.  His labs were unremarkable.  Creatinine was at baseline.  LFTs were actually improved and were within normal limits and his troponins were negative.  Chest x-ray was clear and cardiology consultation was requested for hypertensive urgency.  Amlodipine, again, as stated, was added with good results with now his blood pressure really within normal limits.      ASSESSMENT AND PLAN:  Overall, Mr. Miguel is a pleasant 63-year-old gentleman with multiple medical problems as outlined above who presents with intermittent episodes of nausea and some hypertension.  With the addition of amlodipine to his medical regimen including carvedilol and losartan, his blood pressure is certainly well controlled.  His EKG is unremarkable as is his cardiovascular exam.  Chest x-ray is clear with no evidence of pulmonary vascular congestion.  I would recommend we  obtain an echocardiogram.  He is due for one anyway.  If this is unchanged, he has a followup appointment with Dr. Garzon already scheduled.  We will continue to follow him as necessary.  Please do not hesitate to call with any questions or concerns.         JAC MEDINA MD [facility]             D: 12/10/2018   T: 12/10/2018   MT: JOSS      Name:     ASHTYN STROUD   MRN:      [id number]        Account:      [id number]   :      1955           Service Date: 12/10/2018      Document: [id number]

## 2018-12-10 NOTE — H&P
"Admitted:     12/09/2018      CHIEF COMPLAINT:  Nausea/vomiting, hypertension, urinary retention.      HISTORY OF PRESENT ILLNESS:  Mr. Miguel is a pleasant 63-year-old female with an unfortunate medical history including Staph sepsis in 2016, with results leading to complications as delineated below, history of embolic septic cerebrovascular accident with multiple residual deficits, spastic hemiplegia on the right, a history of cardiomyopathy with normalization of ejection fraction, COPD, depression, hyperlipidemia, hypertension, chronic kidney disease.  It seems as if Mr. Miguel has had some issues for the last couple weeks with nausea and occasional vomiting.  His partner, Meghna, spends quite a bit with him and helps care for him.  It sounds as if his blood pressure has been running a little high at home.  He developed worsening nausea, vomiting and reported stomach pains, prompting his visit to the Emergency Department.  His family also reports him as being \"not normal.\"  He is more \"withdrawn and out of it.\"  They also state he has been shaking a lot.  His nausea has been present since summer of 2018 and it comes and goes.  At the time of my visit, he denied any complaints of pain.  He denies fevers or chills.  Denies chest pain.  He states his breathing is okay.  He has ongoing nausea with occasional vomiting.  He was noted to have urine pain on arrival on the medical floor.  He had a Espinoza catheter placed with approximately 1200 mL on the right after.  His pain in his lower belly was alleviated.      In the Emergency Department, his blood pressures in the 140s to 176 systolic, although they have been as high as 209/151.  Respiratory rate was fine and heart rates in the 70s-80s.  He is afebrile with okay oxygen saturations.  Exam was largely unremarkable, but lungs were clear and heart was regular.  He really did not have any abdominal tenderness.  Labs were largely unremarkable and his creatinine was 1.2, " which is baseline.  LFTs were fine.  Troponin was negative.  Urinalysis bland.  Chest x-ray was clear.  C. difficile is unremarkable.  EKG appeared unchanged from previous.  Blood cultures were checked and are pending.  He was given labetalol 20 mg IV twice for his hypertension, and Lasix once for some possible pulmonary vascular congestion and hypertension.  On arrival to the medical floor,  his blood pressures were better in the 140s to 150s systolic.      PAST MEDICAL HISTORY:   1.  History of MSSA sepsis in 04/2016.   2.  History of aortic valve replacement in 05/2016 secondary to his aforementioned sepsis.   3.  Cerebrovascular accident secondary to septic emboli including cerebellar, left middle cerebral and the right occipital regions.   4. In the summer of 20 17, he had aortic valve replacement.   5.  History of cardiomyopathy with normalization of his ejection fraction at 50% in 04/2018.   6.  Mild COPD.   7.  Depression.   8.  Hypertension.   9.  Hyperlipidemia.   10.  Chronic kidney disease.  Creatinine 1.15.      MEDICATIONS:   1.  Albuterol.   2.  Aspirin 81 mg daily.   3.  Atorvastatin 40 mg daily.   4.  Refresh drops to his left eye as needed t.i.d.   5.  Coreg 12.5 mg b.i.d.   6.  Pepcid 20 mg daily.   7.  Fluoxetine 20 mg daily.   8.  Losartan 50 mg b.i.d.   9.  Ondansetron.      SOCIAL HISTORY:  The patient does not drink or use drugs currently.  He currently lives with his partner, Meghna, who helps him at home.      FAMILY HISTORY:  Reviewed and is noncontributory.      ALLERGIES:     1.  BACLOFEN.     2.  LISINOPRIL.      REVIEW OF SYSTEMS:  As discussed above in history of present illness.  The patient appears to be reasonably reliable historian although the family does question this occasionally.      PHYSICAL EXAMINATION:   VITAL SIGNS:  Blood pressure is in the 140s-150s systolic over 100-130 diastolic.  He is afebrile.  His pulse is 78, respiratory rate is 12.  O2 saturations are 93%.    GENERAL:  He is alert, oriented, in no distress.   HEENT:  His right extraocular movements were intact.  His left eye is sewn shut.  He has severe facial droop on the left.  His oropharynx otherwise appears clear.   NECK:  Supple.   CARDIOVASCULAR:  Revealed a regular rate and rhythm without murmurs or gallops.   EXTREMITIES:  No edema.   LUNGS:  Clear to auscultation bilaterally without wheezes or crackles.   ABDOMEN:  Soft, nontender, nondistended with good bowel sounds.   GENITOURINARY:  Had a Espinoza in place.   MUSCULOSKELETAL:  There was no arthritis or deformities noted.   SKIN:  Showed no rashes or lesions.   NEUROLOGIC:  Again, as above his right eye appeared intact.  His left eye is sewn shut.  He has severe left facial droop.  He is unable to move the right side.  Left side, he is shaky but his strength is grossly intact.   PSYCHIATRIC:  Difficult to assess.      LABORATORY DATA:  CBC was fine.  BMP was remarkable for creatinine 1.2.  Liver function tests were fine.  BNP was 1700.  Troponin was negative.  Urinalysis bland.  Blood cultures were checked and pending.      IMAGING:  Chest x-ray is clear.  Head CT showed no acute findings.  EKG was without ischemic changes.      ASSESSMENT AND PLAN:  Mr. Miguel is a very pleasant 63-year-old gentleman who presents with nausea, vomiting, hypertension and urinary retention.   1.  Nausea, vomiting, etiology unclear at this time.  Nausea has been going on for a while with intermittent vomiting.  Appears to have been first or so last couple of days, prompting today's visit.  Unclear if this is related to his cardiac issues or not.  LFTs are fine peaking, making gallbladder pathology less likely.  Symptomatic treatment for now and will monitor, especially with the improvement in hypertension.   2.  Hypertension.  Blood pressures initially in the Emergency Department were 209/151.  Improved to the 140s or 150s systolic once he was on the cardiac unit.  His prior to  admission antihypertensive regimen includes Coreg 12.5 mg b.i.d. and losartan.  He follows with Cardiology Clinic.  He appears mostly euvolemic.  BNP is mildly elevated at 1700.  He has a history of cardiomyopathy but normalization of ejection fraction in 2018.  Tonight I will give him amlodipine 5 mg daily.  He also received labetalol and furosemide in the Emergency Department as discussed above.   3.  Hydralazine p.r.n. will be available.  He will get an echo tomorrow morning and consult Cardiology.   4.  History of MSSA sepsis in 2016 with multiple complications including aortic valve replacement, septic embolization, aortic root and valve replacement.  He has residual right-sided paresis, left eye blindness and left facial droop.  He follows with Physical Medicine Rehabilitation as an outpatient.  They are helping manage his spasticity.  Defer to management as an outpatient.   5.  Urinary retention.  He was noted to have suprapubic discomfort on presentation with minimal urine output.  A Espinoza catheter was placed on the medical floor.  He drained about 1.2 liters of urine.  His pressure and pain are improved.  It is unclear as to whether this was causing any of his hypertension on presentation.  It sounds like this is relatively new.  Monitor after Espinoza placement and possible discontinuation of his Espinoza with monitoring soon.   6.  Depression.  Continue fluoxetine.   7.  Hyperlipidemia.  Continue atorvastatin.   8.  Chronic kidney disease with baseline creatinine 1.1-1.5.  Avoid nephrotoxins and monitored with blood pressure control.   9.  CODE STATUS:  DNR/DNI.  This was confirmed with the patient and family today.   10.  Deep venous thrombosis prophylaxis will be mechanical.         FERNANDO CULVER MD             D: 12/10/2018   T: 12/10/2018   MT: ALMAZ      Name:     ASHTYN STROUD   MRN:      [id number]        Account:      [id number]   :      1955        Admitted:     2018                    Document: [id number]

## 2018-12-11 PROBLEM — I71.00 DISSECTION OF AORTA (H): Status: ACTIVE | Noted: 2018-01-01

## 2018-12-11 NOTE — PLAN OF CARE
"Pt A/O. VSS. Been bedrest since admission. Tele shows sr. Pt denies any CP or SOB. Echo/ CT/ abd xray done today. Needs to have BM. Suppository given. Enema available if unsuccessful. Pt has no new complaints.    /85   Pulse 78   Temp 98.6  F (37  C)   Resp 17   Ht 1.727 m (5' 8\")   Wt 81.4 kg (179 lb 7.3 oz)   SpO2 92%   BMI 27.29 kg/m      "

## 2018-12-11 NOTE — PROGRESS NOTES
Spoke with Rivka Mckenzie from [facility] and she said to plan for the family care conference for 3pm on Wednesday 12/12/2018.   Patient, SO-Meghna, and daughter Nina updated with information and Nina to contact her brother Christian and sister Elma about time of meeting.   Jasper Douglas PA aware and writer will alert Dr Lang to plan as well.

## 2018-12-11 NOTE — PROGRESS NOTES
[facility]    Medicine Progress Note - Hospitalist Service       Date of Admission:  12/9/2018  Assessment & Plan   Cricket Miguel is a 63 year old male admitted on 12/9/2018.     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    Thoracic Aortic Dissection:  Per Echo the aortic root dilated at 7cm and difficult visualization of the ascending aorta with suspected fluid accumulation.    ASSESSMENT:  CTA chest/abd/pelvis revealed new jonathan type A dissection of the ascending thoracic aorta measuring 7.1 x 7.3 cm.    PLAN:  --Appreciate Cardiology consult:  Continue blood pressure management and attempt to maintain systolic BP < 120.    --Appreciate Cardiothoracic surgery consult:  Plan for care conference tomorrow to discuss options/planning.      Hypertensive Urgency:  Noted BP of 209/151 upon presentation to the ED.  Improved to the 140-150's systolic following administration of IV lasix and IV labetalol.    ASSESSMENT:  Resolved, blood pressures consistently below 120 systolic today.    PLAN:  --Cardiology consult appreciated.     --Resume PTA Coreg 12.5 mg BID and losartan 50 mg BID.    --Start amlodipine 5 mg daily.    --PRN IV hydralazine available.      Nausea and vomiting:  Unclear etiology, could be secondary to hypertensive urgency.  Initial complaints that led to evaluation in the ED.  Has been present for the last couple of days.    ASSESSMENT:  Resolved.    PLAN:  --Symptomatic management with PRN Zofran, Compazine, Reglan.      Constipation:  Per patient's wife he receives Fleet enemas every third day due to constipation.    Noted large amount of stool on abdominal Xray and CT.    ASSESSMENT:  Improving noted large bowel  movement last night and noted continued lose stool today.    PLAN:  --Bowel regiment started.      Urinary retention:  Espinoza catheter placed on the floor with 1.2 L output.  ASSESSMENT:  Improved; incontinent of urine last night.    PLAN:  --Bladder scan post void ordered.    --Monitor.      History of MSSA sepsis with infective carditis and with multiple complications:  Required aortic valve and replacement, septic embolization with residual right-sided paresis, left eye blindness and left facial droop.  He follows with [facility] as outpatient.    ASSESSMENT:  Stable.    PLAN:  --Monitor.      Major Depressive Disorder:  ASSESSMENT:  Stable.    PLAN:  --Resume PTA fluoxetine.      HLP:  ASSESSMENT:  Stable.    PLAN:  --Resume PTA atorvastatin.      CKD stage II:  Baseline creatinine 1.1-1.5.    ASSESSMENT:  Stable.    Recent Labs   Lab 12/11/18  0534 12/10/18  0538 12/09/18  1609   CR 1.35* 1.38* 1.23   PLAN:  --Monitor while adjusting blood pressure medications.    --Avoid nephrotoxic agents.      GERD:  ASSESSMENT:  Improved.    PLAN:  --Resume PTA Pepcid.    --PRN Tums ordered per patient request.    --PRN simethicone available.       Diet: Combination Diet Low Saturated Fat Na <2400mg Diet, No Caffeine Diet  DVT Prophylaxis: Pneumatic Compression Devices  Espinoza Catheter: not present  Code Status: DNR/DNI    Disposition Plan   Expected discharge: unclear with noted aortic anneurysm, recommended to prior living arrangement once definitive plan determined following care conference scheduled 12/12.  Entered: Walter Douglas PA-C 12/11/2018, 7:16 AM       The patient has been discussed with Dr. Moore, who agrees with the assessment and plan at this time. Dr. Moore will evaluate the patient independently.       Walter Douglas PA-C  Hospitalist Service  [facility]    ______________________________________________________________________    Interval History    Patient resting in a chair upon arrival.  Significant other present in the room.      Patient denied fever, chills, chest pain, shortness of breath, abdominal pain, nausea/vomiting.      Reviewed findings of CT scan and plan for care conference tomorrow.      Data reviewed today: I reviewed all medications, new labs and imaging results over the last 24 hours. I personally reviewed the chest x-ray image(s) showing no pulmonary congestion and appears clear.    Physical Exam   Vital Signs: Temp: 98.1  F (36.7  C) Temp src: Axillary BP: 103/72   Heart Rate: 50 Resp: 16 SpO2: 94 % O2 Device: Nasal cannula Oxygen Delivery: 2 LPM  Weight: 186 lbs 11.67 oz    Constitutional: Awake, alert, cooperative, NAD.    ENT: NCAT, left eye sutured shut, left sided facial droop, oral pharynx with moist mucus membranes, tonsils without erythema or exudates.     Neck: Supple, symmetrical, trachea midline, no adenopathy.  Pulmonary: No increased work of breathing, good air exchange, CTA bilaterally, no crackles or wheezing.  Cardiovascular: Reg rate rhythm, normal S1 and S2, no S3 or S4, and no murmur noted.  GI: Active bowel sounds, soft, non-distended, non-tender.    Skin/Integumen: Clear.  Neuro: CN II-XII grossly intact.  Left facial droop unable to move right side.  Left upper extremity tremulous but strength is intact.    Psych:  Alert and oriented x 3.  Flat affect.    Extremities: No lower extremity edema noted, and non-TTP bilaterally.       Data   Recent Labs   Lab 12/11/18  0534 12/10/18  0538 12/09/18  1609   WBC  --  10.0 10.8   HGB  --  15.0 15.9   MCV  --  86 87   PLT  --  262 279    140 140   POTASSIUM 3.8 4.0 4.1   CHLORIDE 107 104 102   CO2 32 28 32   BUN 25 20 16   CR 1.35* 1.38* 1.23   ANIONGAP 2* 8 6   IZABELLA 8.6 9.3 9.5   * 106* 133*   ALBUMIN  --   --  3.9   PROTTOTAL  --   --  8.3   BILITOTAL  --   --  0.7   ALKPHOS  --   --  124   ALT  --   --  24   AST  --   --  17   LIPASE  --   --  59*   TROPI  --    --  <0.015     Recent Results (from the past 24 hour(s))   CTA Chest Abdomen Pelvis w Contrast   Result Value    Radiologist flags (AA)     Greg type A dissection and aneurysmal dilatation    Narrative    CTA CHEST, ABDOMEN AND PELVIS WITH CONTRAST   12/10/2018 8:13 PM     HISTORY: Complication of artery following a procedure; unable to  visualize the ascending aorta on echo with noted aortic root measured  at 7 cm.    TECHNIQUE: CTA chest, abdomen and pelvis without and with 80 mL  Isovue-370 IV. Radiation dose for this scan was reduced using  automated exposure control, adjustment of the mA and/or kV according  to patient size, or iterative reconstruction technique. 3-D images  were created at an independent workstation under concurrent  supervision at the request of the ordering provider.    COMPARISON: 5/7/2018, 3/10/2018    FINDINGS:     Chest: Postoperative changes of the ascending thoracic aorta.  Resolution of previously seen hematoma around the ascending aorta.  There is new Greg type A dissection of the ascending thoracic  aorta with aneurysmal dilatation. The dissection starts at the aortic  root and terminates before the takeoff of the innominate artery. The  aortic root measures 6.8 cm. Maximum diameter of the ascending  thoracic aorta measures 7.1 x 7.3 cm. Aortic arch measures 3.2 cm.  Proximal descending thoracic aorta measures 3.7 cm, mid descending  thoracic aorta measures 3.1 cm, distal descending thoracic aorta at  the level of the diaphragm measures 3.2 cm.    No pleural effusion, pericardial effusion or pneumothorax. No  axillary, hilar or mediastinal lymphadenopathy. Changes in median  sternotomy. Thyroid is normal. Calcified bilateral hilar lymph nodes,  suggests prior granulomatous disease. Calcified granulomas are seen in  both lungs.    Abdomen: Evaluation of the solid organ parenchyma is limited secondary  to contrast bolus timing. Hepatic cyst is unchanged.  Cholelithiasis  without evidence of cholecystitis. Punctate nonobstructing calculi are  noted in both kidneys. Calcifications are noted in the spleen,  consistent with prior granulomatous disease. Thickening of the left  adrenal gland is unchanged. Right adrenal gland is normal. Pancreas  shows no focal abnormality. No intrahepatic or extra hepatic biliary  dilatation. No intraperitoneal free air or free fluid.    Pelvis: Small and large bowel are normal in caliber without evidence  of obstruction. Moderate amount of stool is seen throughout the  rectum. Left inguinal hernia containing fat. No abdominal or pelvic  lymphadenopathy. Bladder is normal.    Atherosclerotic disease is seen throughout the abdominal aorta without  abdominal aortic aneurysm. Celiac axis, superior mesenteric artery,  inferior mesenteric artery, 2 left and 2 right renal arteries are all  patent.    Bones: No suspicious bony lesions.      Impression    IMPRESSION:  1. New Greg type A dissection with aneurysmal dilatation of the  ascending thoracic aorta measuring up to 7.1 x 7.3 cm. The dissection  starts at the aortic root and terminates before the takeoff of the  innominate artery.  2. Large amount of stool in the rectum.  3. Cholelithiasis without evidence of cholecystitis.    [Critical Result: [place] type A dissection and aneurysmal dilatation  of the ascending thoracic aorta]     Finding was identified on 12/11/2018 at 10:00 AM.     Jasper Douglas PA-C was contacted by me on 12/11/2018 at 10:18 AM and  verbalized understanding of the critical result.     GHADA URENA,      Medications     sodium chloride 75 mL/hr at 12/11/18 0609       amLODIPine  5 mg Oral Daily     aspirin  81 mg Oral or Feeding Tube Daily     atorvastatin  40 mg Oral Daily     carvedilol  12.5 mg Oral BID     famotidine  20 mg Oral Daily     FLUoxetine  20 mg Oral Daily     losartan  50 mg Oral BID     polyethylene glycol  17 g Oral BID     prune juice  300  mL Oral BID

## 2018-12-11 NOTE — CONSULTS
CARDIAC SURGERY CONSULT NOTE    Consult Reason: abnormal chest CTA    HPI: 63 year old male with extensive cardiac surgical history. Admitted on 12/9 with nausea and vomiting. Reportedly had not been well recently, in combination with hypertensive urgency.    Surgical history notable for bio [place] in 2016 for root aneurysm. 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 Underwent cryopreserved homograft root replacement in December of 2017 with complicated perioperative course.    ECHO this admission with normal EF and AV function, question of ascending aneurysm. CTA performed and noted below.    Patient and family affirm the history noted.      A/P: Patient is a 63 year old male with cardiac surgical history noted. 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 What appears to be a dissection flap is likely the aortic wall of the homograft. I am unaware of any reported cases of a dissection occurring within a homograft root.    Pseudoaneurysm is likely from one of the suture lines from his root reconstruction. This is likely not from the root itself, as this would result in the appearance of AI on ECHO. His left coronary button looks OK by CTA, so I suspect the culprit area is either the right coronary button or the homograft to native aorta suture line.    Review of his CT from March of this year similarly shows a fluid collection around the ascending aorta. This was a PE protocol scan, so it is difficult to ascertain whether there may have been a smaller pseudoaneurysm at that time.     In either case, I do not think this finding is an acute change, and does not explain his symptoms. Additionally, sternal re entry would be very high risk, and the further operative possibilities would depend upon the location of the neck of pseudoaneurysm, but could include another redo root replacement.    I discussed  this at length with the patient and his daughter. He is currently DNR, and they do not sound as though they are interested in another surgery at this point. His daughter particularly voiced her concerns about his difficult recovery last year and his overall poor quality of life resulting from his CVA. Another operation will certainly not improve his quality of life, and could indeed worsen it.     I also explained that while I could not predict the natural history of this pseudoaneurysm, with appropriate blood pressure control, I do not expect it to be immediately life threatening.    Our team will plan to attend the scheduled care conference tomorrow afternoon.      Thank you for the opportunity to participate in the care of this patient.      Rosendo Gama MD        PMH:  Past Medical History:   Diagnosis Date     Abscess of aortic root 10/06/2017     AI (aortic insufficiency)     severe     Anxiety      Aortic root dilatation (H)      AR (aortic regurgitation)     severe     Cardiomyopathy (H)      CHF (congestive heart failure), NYHA class II (H)      Constipation     Dr Mcghee     COPD, mild (H)     spirometry 12/16     CVA (cerebral vascular accident) (H) 10/06/2017    septic emboli - MSSA - cerebellum, Left MCA, Rt Occipital, Aphasia, Left facial paralysis, Right>Left UE/LE weakness, Left visual field cut, moderate to severe encephalopathy, cognitive dysfunction, word finding     Depression 10/06/2017     Elevated PSA 05/2018    PSA = 8, Dr Garcia     Heart murmur      Hyperlipidemia LDL goal <70 10/31/2010     Hypertension goal BP (blood pressure) < 140/90      Inguinal hernia      left lung nodule  1/29/2008     Major depressive disorder, single episode, moderate (H)      Psoriasis childhood     SNHL (sensorineural hearing loss)     Left, secondary to CVA     Tobacco use disorder          PSH:  Past Surgical History:   Procedure Laterality Date     ARTHROSCOPY KNEE INCISION AND DRAINAGE Left 10/8/2017     Arthroscopic Incision and Drainage of Left Knee - Dr Munoz     HC SHOULDER ARTHROSCOPY, DX  2001    Arthroscopy, Shoulder RT Dr OSIRIS Andersen     HC SHOULDER ARTHROSCOPY, DX  1/04    LT arthroscopy Dr OSIRIS Andersen     HERNIA REPAIR, UMBILICAL  1/08    incarcerated - dr rockwell     HERNIORRHAPHY INGUINAL  12/21/2012    HERNIORRHAPHY INGUINAL;  Right Inguinal Hernia Repair with mesh ;  Surgeon: Mello Rockwell MD;  Location: [facility] OR     REPAIR ANEURYSM ASCENDING AORTA N/A 12/19/2017    REPAIR ANEURYSM ASCENDING AORTA;  REDO Median STERNOTOMY, FEMORAL CANNULATION, Lysis of adhesions, AORTIC ROOT VALVE HOMOGRAFT with 26mm x 7.0cm Cryolife Aortic valve and conduit - Dr Bowers     REPLACE VALVE AORTIC N/A 5/17/2016    REPLACE VALVE AORTIC - Dr Bowers     ROTATOR CUFF REPAIR RT/LT  11/10    Rt arthroscopy - Dr OSIRIS Andersen     SURGICAL HISTORY OF -   5/16    AVR, severe AR, aortic root repair     TRACHEOSTOMY PERCUTANEOUS  10/27/2017              FH:  family history includes Alzheimer Disease in his father; Cancer in his mother; Genetic Disorder in his mother; Other Cancer in his mother.        Allergies:  Allergies   Allergen Reactions     Baclofen Nausea and Vomiting     Loss of bladder control     Lisinopril Swelling     One-sided facial swelling after being on lisinopril/HCTZ for one week.        Home Meds:  Medications Prior to Admission   Medication Sig Dispense Refill Last Dose     albuterol (PROAIR HFA/PROVENTIL HFA/VENTOLIN HFA) 108 (90 Base) MCG/ACT inhaler Inhale 2 puffs into the lungs every 6 hours as needed for shortness of breath / dyspnea or wheezing 1 Inhaler 3 prn     aspirin 81 MG chewable tablet 1 tablet (81 mg) by Oral or Feeding Tube route daily 60 tablet 1 12/9/2018     atorvastatin (LIPITOR) 40 MG tablet Take 1 tablet (40 mg) by mouth daily 60 tablet 1 12/9/2018     Carboxymethylcellulose Sod PF (REFRESH PLUS) 0.5 % SOLN ophthalmic solution Place 1 drop Into the left eye 3 times daily as needed (to flush  debris from eye) 1 Bottle 1 prn     carvedilol (COREG) 12.5 MG tablet Take 1 tablet (12.5 mg) by mouth 2 times daily 180 tablet 3 12/9/2018 at x2 doses      famotidine (PEPCID) 20 MG tablet TAKE 1 TABLET BY MOUTH EVERY DAY 90 tablet 1 12/9/2018     FLUoxetine (PROZAC) 20 MG capsule Take 1 capsule (20 mg) by mouth daily 90 capsule 3 12/9/2018     losartan (COZAAR) 50 MG tablet Take 1 tablet (50 mg) by mouth 2 times daily 180 tablet 1 12/9/2018 at x2 doses     multivitamin, therapeutic with minerals (THERA-VIT-M) TABS tablet Take 1 tablet by mouth daily 30 each 0 12/9/2018     ondansetron (ZOFRAN) 4 MG tablet Take 1-2 tablets (4-8mg) every 8 hours as needed for nausea 20 tablet 0 prn       ROS: + as noted above    Physical Exam:  Temp:  [98.1  F (36.7  C)-98.6  F (37  C)] 98.4  F (36.9  C)  Pulse:  [60] 60  Heart Rate:  [50-65] 60  Resp:  [13-21] 13  BP: ()/(65-85) 108/72  SpO2:  [89 %-96 %] 95 %    Gen: NAD, resting comfortably  Lungs: non-labored breathing  Abd: non distended  Ext: cool    Labs:  ABG No lab results found in last 7 days.  CBC  Recent Labs   Lab 12/10/18  0538 12/09/18  1609   WBC 10.0 10.8   HGB 15.0 15.9    279     BMP  Recent Labs   Lab 12/11/18  0534 12/10/18  0538 12/09/18  1609    140 140   POTASSIUM 3.8 4.0 4.1   CHLORIDE 107 104 102   CO2 32 28 32   BUN 25 20 16   CR 1.35* 1.38* 1.23   * 106* 133*     LFT  Recent Labs   Lab 12/09/18  1609   AST 17   ALT 24   ALKPHOS 124   BILITOTAL 0.7   ALBUMIN 3.9     Pancreas  Recent Labs   Lab 12/09/18  1609   LIPASE 59*       Imaging:    ECHO 12/9/18    Interpretation Summary     1. The left ventricle is normal in size. The visual ejection fraction is  estimated at 55%. Normal left ventricular wall motion  2. The right ventricle is normal in structure, function and size.  3. s/p tissue aortic valve replacement (26mm Cryolife valve attached to aortic  conduit, implanted 12-22-17). Mean 3mmHg, Vmax 1.1m/s. No AI or  paravalvular  leak. No valve dehiscence.  4. s/p ascending aortic repair redo with 30mm graft. The aortic root is very  dilated, up to 7 cm. Views of this area are very limited, but suspect this is  the residual aorta and/or fluid around the implanted graft. Cannot further  comment on any acute aortic pathology due to limited views.     When directly compared to echo from 3/2018, the residual aortic root appears  larger, but hard to compare due to limited views.        CTA 12/10/18    FINDINGS:      Chest: Postoperative changes of the ascending thoracic aorta.  Resolution of previously seen hematoma around the ascending aorta.  There is new Greg type A dissection of the ascending thoracic  aorta with aneurysmal dilatation. The dissection starts at the aortic  root and terminates before the takeoff of the innominate artery. The  aortic root measures 6.8 cm. Maximum diameter of the ascending  thoracic aorta measures 7.1 x 7.3 cm. Aortic arch measures 3.2 cm.  Proximal descending thoracic aorta measures 3.7 cm, mid descending  thoracic aorta measures 3.1 cm, distal descending thoracic aorta at  the level of the diaphragm measures 3.2 cm.     No pleural effusion, pericardial effusion or pneumothorax. No  axillary, hilar or mediastinal lymphadenopathy. Changes in median  sternotomy. Thyroid is normal. Calcified bilateral hilar lymph nodes,  suggests prior granulomatous disease. Calcified granulomas are seen in  both lungs.     Abdomen: Evaluation of the solid organ parenchyma is limited secondary  to contrast bolus timing. Hepatic cyst is unchanged. Cholelithiasis  without evidence of cholecystitis. Punctate nonobstructing calculi are  noted in both kidneys. Calcifications are noted in the spleen,  consistent with prior granulomatous disease. Thickening of the left  adrenal gland is unchanged. Right adrenal gland is normal. Pancreas  shows no focal abnormality. No intrahepatic or extra hepatic biliary  dilatation. No  intraperitoneal free air or free fluid.     Pelvis: Small and large bowel are normal in caliber without evidence  of obstruction. Moderate amount of stool is seen throughout the  rectum. Left inguinal hernia containing fat. No abdominal or pelvic  lymphadenopathy. Bladder is normal.     Atherosclerotic disease is seen throughout the abdominal aorta without  abdominal aortic aneurysm. Celiac axis, superior mesenteric artery,  inferior mesenteric artery, 2 left and 2 right renal arteries are all  patent.     Bones: No suspicious bony lesions.                                                                      IMPRESSION:  1. [place] type A dissection with aneurysmal dilatation of the  ascending thoracic aorta measuring up to 7.1 x 7.3 cm. The dissection  starts at the aortic root and terminates before the takeoff of the  innominate artery.  2. Large amount of stool in the rectum.  3. Cholelithiasis without evidence of cholecystitis.

## 2018-12-11 NOTE — PROGRESS NOTES
"Cardiology Progress Note          Assessment and Plan:     1) Aortic Dissection  CT performed last night amazingly not read at this point delaying surgical consultation.  Will call them urgently.  Patient and family want a family conference.  The echo demonstrates a dramatic increase in size compared to 3/18.  Poor prognosis with medical management however patient very high risk redo surgery.  Spoke with Dr. Bowers who is aware of patient but of work today.  Keep SBP .  CT scan personally reviewed showing aortic aneurysm with dissection extending around aortic arch.    2) S/P AVR with homograft for endocarditis     3) N/V- likely related to #1 but unclear.      4) CKD  5) CVA  6) Hemiplegia  7) CM- normal LVEF  `            Interval History:   denies chest pain and doing well; no cp, sob, n/v/d, or abd pain.                   Review of Systems:   The 5 point Review of Systems is negative other than noted in the HPI          Medications:   I have reviewed this patient's current medications             Physical Exam:   Blood pressure 96/71, pulse 78, temperature 98.4  F (36.9  C), temperature source Axillary, resp. rate 13, height 1.727 m (5' 8\"), weight 84.7 kg (186 lb 11.7 oz), SpO2 96 %.        Vital Sign Ranges  Temperature Temp  Av.3  F (36.8  C)  Min: 98.1  F (36.7  C)  Max: 98.6  F (37  C)   Blood pressure Systolic (24hrs), Av , Min:94 , Max:121        Diastolic (24hrs), Av, Min:65, Max:87      Pulse No Data Recorded   Respirations Resp  Avg: 15.2  Min: 9  Max: 21   Pulse oximetry SpO2  Av.8 %  Min: 89 %  Max: 96 %         Intake/Output Summary (Last 24 hours) at 2018 1005  Last data filed at 12/10/2018 2220  Gross per 24 hour   Intake 460 ml   Output --   Net 460 ml       Constitutional:   Alert and oriented, well developed, in no acute distress.   Skin:   Warm and dry to touch; no apparent skin lesions.   Head:   Normocephalic, atraumatic.   Eyes:   Pupils equal and round, " conjunctivae and lids unremarkable, no xanthalasma.   Neck:   cartoid pulses are full and equalbilaterally, JVP normal, no carotid bruit.   Chest:   No tenderness to palpation.   Lungs:   No increased work of breathing, good air exchange, clear to auscultation bilaterally.   Cardiovascular:   Regular rhythm, S1 normal, S2 normal, No S3 or S4, no murmurs or rubs detected.   Abdomen:   Abdomen soft, bowel sounds normoactive, no hepatosplenomegaly, non-tender.   Peripheral Pulses:   Pulses full and equal in all extremities, no bruits auscultated.   Extremities and Back:   No clubbing, cyanosis.  No edema observed.   Neurological:   hemiplegia   Hematologic/Lymphatic:    Hematologic / Lymphatic: No cervical lymphadenopathy and no supraclavicular lymphadenopathy.   Additional Exam Findings:                    Data:     Lab Results   Component Value Date    WBC 10.0 12/10/2018    HGB 15.0 12/10/2018    HCT 44.2 12/10/2018     12/10/2018     12/11/2018    POTASSIUM 3.8 12/11/2018    CHLORIDE 107 12/11/2018    CO2 32 12/11/2018    BUN 25 12/11/2018    CR 1.35 (H) 12/11/2018     (H) 12/11/2018    SED 9 11/29/2018    DD 1.7 (H) 03/10/2018    NTBNPI 1,656 (H) 12/09/2018    NTBNP 294 (H) 08/24/2018    TROPONIN <0.07 12/05/2005    TROPI <0.015 12/09/2018    AST 17 12/09/2018    ALT 24 12/09/2018    ALKPHOS 124 12/09/2018    BILITOTAL 0.7 12/09/2018    INR 1.35 (H) 12/21/2017         EKG results:  Reviewed if available.   I have reviewed this patient's EKG with the following interpretation:

## 2018-12-11 NOTE — PLAN OF CARE
"VSS with HR low to mid 50's. O2 sats 89% while lying in bed RN added 2L NC O2 sats 95% will reduce to 1L and trial rest of night. Patient had refused enema on 12/10 at 2100 and approximately 2200 had large formed BM incontinent baseline. Patient was cleaned and skin is WDL intact. Denies pain. Not able to sleep well requested PRN benedryl/tylenol and given. Patient significant other arrived at 0030 and is sleeping in patient room for the night. Patient aware and 'happy\". Alarms on for safety. Modified call pad under patients R hand for easy access.  "

## 2018-12-11 NOTE — PROGRESS NOTES
"Patient not here at handoff report was reported down at CT for aorta.  Patient returned from CT and RN settled patient in bed, changed for incontinence  and repositioned . Told patient handoff noted that he was to have an enema for noted stool seen on abdominal CT. He is \"refusing an enema in the evening, I have been through enough tonight\"!  \"If I don't go by tomorrow they can do it then!\". Patient denies pain or problems and is watching the [employer] game. VSS with HR 50 and /51. O2 sats on RA were 88-89, RN added NC 2L and sats now 95%. Patient is total care for hx stroke and uses the call pad which is positioned at this time under his R hand and he has performed pushing it to show RN he can make his needs known. Bedalarm on for safety.   "

## 2018-12-12 NOTE — PROGRESS NOTES
[facility]    Medicine Progress Note - Hospitalist Service       Date of Admission:  12/9/2018  Assessment & Plan   Cricket Miguel is a 63 year old male admitted on 12/9/2018.     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    Thoracic Aortic Aneurysm/Pseudoaneurysm:  Per Echo the aortic root dilated at 7cm and difficult visualization of the ascending aorta with suspected fluid accumulation.  CTA chest/abd/pelvis revealed new jonathan type A dissection of the ascending thoracic aorta measuring 7.1 x 7.3 cm.   ASSESSMENT:  CV Surgery evaluated imaging studies and determined this likely represents an aneurysm/pseudoaneurysm.    --Patient and family met with Dr. Bowers and Dr. Lang this morning.  Per their report he is not a surgical candidate.    PLAN:  --Appreciate Cardiology consult:     --Continue blood pressure management and attempt to maintain systolic BP < ~120.   --Elevated pressures today with plans to increase Norvasc to 10 mg tomorrow morning.      --Appreciate CV Surgery consult:     --Not a surgical candidate per patient and family's report following discussion with Dr. Bowers.      Hypertensive Urgency:  Noted BP of 209/151 upon presentation to the ED.  Improved to the 140-150's systolic following administration of IV lasix and IV labetalol.    ASSESSMENT:  BP was improved yesterday unfortunately elevated today.    PLAN:  --Cardiology consult appreciated.     --Resume PTA Coreg 12.5 mg BID and losartan 50 mg BID.    --Start amlodipine 5 mg daily-->plan to uptitrate to 10 mg tomorrow.    --PRN IV hydralazine available.      Nausea and vomiting:  Unclear etiology, could be secondary to hypertensive urgency.  Initial  complaints that led to evaluation in the ED.  Has been present for the last couple of days.    ASSESSMENT:  Resolved.    PLAN:  --Symptomatic management with PRN Zofran, Compazine, Reglan.      Constipation:  Per patient's wife he receives Fleet enemas every third day due to constipation.    Noted large amount of stool on abdominal Xray and CT.    ASSESSMENT:  Resolving; now with loose stools.    PLAN:  --Bowel regiment.      Urinary retention:  Espinoza catheter placed on the floor with 1.2 L output.  ASSESSMENT:  Improved; incontinent of urine.    PLAN:  --Bladder scan post void ordered.    --Monitor.      History of MSSA sepsis with infective carditis and with multiple complications:  Required aortic valve and replacement, septic embolization with residual right-sided paresis, left eye blindness and left facial droop.  He follows with [facility] as outpatient.    ASSESSMENT:  Stable.    PLAN:  --Monitor.      Major Depressive Disorder:  ASSESSMENT:  Stable.    PLAN:  --Resume PTA fluoxetine.      HLP:  ASSESSMENT:  Stable.    PLAN:  --Resume PTA atorvastatin.      CKD stage II:  Baseline creatinine 1.1-1.5.    ASSESSMENT:  Stable.    Recent Labs   Lab 12/11/18  0534 12/10/18  0538 12/09/18  1609   CR 1.35* 1.38* 1.23   PLAN:  --Monitor while adjusting blood pressure medications.    --Avoid nephrotoxic agents.      GERD:  ASSESSMENT:  Improved.    PLAN:  --Resume PTA Pepcid.    --PRN Tums ordered per patient request.    --PRN simethicone available.      GOALS OF CARE:  Met with patient,  His significant other and his 2 daughters.  Discussed meetings/evaluations by Cardiology (Dr. Lang) and CV Surgery (Dr. Bowers).    --Family reported patient is not a surgical candidate.    --Discussed Palliative versus Hospice Care.       Diet: Combination Diet Low Saturated Fat Na <2400mg Diet, No Caffeine Diet  DVT Prophylaxis: Pneumatic Compression Devices  Espinoza Catheter: not present  Code Status:  DNR/DNI    Disposition Plan   Expected discharge: tomorrow or the following day, recommended to prior living arrangement once meeting with  Hospice tomorrow and pending blood control.  Entered: Walter Douglas PA-C 12/12/2018, 9:25 AM       The patient has been discussed with Dr. Monsalve, who agrees with the assessment and plan at this time. Dr. Monsalve will evaluate the patient independently.       Walter Douglas PA-C  Hospitalist Service  [facility]    ______________________________________________________________________    Interval History   Patient in bed with family present in the room.  Met all of them with  Tameka Singh and discussed goals of care.      Patient offered no complaints.  He denied fever, chills, chest pain, shortness of breath, abdominal pain, nausea or vomiting.  He had no questions.      His significant other and daughters had questions regarding blood pressure control and what medications could be continued and stopped while on Hospice/Palliative services.      Attempted to answer questions regarding possible causes of nausea/vomiting as well as how to prevent constipation at home.      Brought up potential of as needed blood pressure medications to be used at home since patient's significant other is his care giver and monitors blood pressure closely.      Data reviewed today: I reviewed all medications, new labs and imaging results over the last 24 hours. I personally reviewed no images or EKG's today.    Physical Exam   Vital Signs: Temp: 98.1  F (36.7  C) Temp src: Axillary BP: 102/77   Heart Rate: 59 Resp: 13 SpO2: 94 % O2 Device: None (Room air)    Weight: 188 lbs .84 oz    Constitutional: Awake, alert, cooperative, NAD.    ENT: NCAT, left eye sutured shut, left sided facial droop, oral pharynx with moist mucus membranes, tonsils without erythema or exudates.     Neck: Supple, symmetrical, trachea midline, no adenopathy.  Pulmonary:  No increased work of breathing, good air exchange, CTA bilaterally, no crackles or wheezing.  Cardiovascular: RRR, normal S1 and S2, no S3 or S4, and no murmur noted.  GI: Normo-active bowel sounds, soft, non-distended, non-tender.    Skin/Integumen: Clear.  Neuro: CN II-XII grossly intact.  Left facial droop present and unable to move right side.    Psych:  Alert and oriented x 3.  Flat affect.    Extremities: No lower extremity edema noted, and non-TTP bilaterally.       Data   Recent Labs   Lab 12/11/18  0534 12/10/18  0538 12/09/18  1609   WBC  --  10.0 10.8   HGB  --  15.0 15.9   MCV  --  86 87   PLT  --  262 279    140 140   POTASSIUM 3.8 4.0 4.1   CHLORIDE 107 104 102   CO2 32 28 32   BUN 25 20 16   CR 1.35* 1.38* 1.23   ANIONGAP 2* 8 6   IZABELLA 8.6 9.3 9.5   * 106* 133*   ALBUMIN  --   --  3.9   PROTTOTAL  --   --  8.3   BILITOTAL  --   --  0.7   ALKPHOS  --   --  124   ALT  --   --  24   AST  --   --  17   LIPASE  --   --  59*   TROPI  --   --  <0.015     No results found for this or any previous visit (from the past 24 hour(s)).  Medications     sodium chloride 75 mL/hr at 12/11/18 0932       amLODIPine  5 mg Oral Daily     aspirin  81 mg Oral or Feeding Tube Daily     atorvastatin  40 mg Oral Daily     carvedilol  12.5 mg Oral BID     famotidine  20 mg Oral Daily     FLUoxetine  20 mg Oral Daily     losartan  50 mg Oral BID     polyethylene glycol  17 g Oral BID     prune juice  300 mL Oral BID

## 2018-12-12 NOTE — PLAN OF CARE
"Uneventful shift with patient sleeping well and without complaints.  Significant other refusing labs for patient this morning and refused frequent turns for patient as she said, \"He can turn himself!\".  VSS.  SBP not above 140 and did not require PRN Hydralazine.  However, patient's DBP in morning was greater than 90 and significant other questioned why that number was not being treated here in the hospital as opposed to their home routine.  "

## 2018-12-12 NOTE — PLAN OF CARE
Some progress with B/P control. Long discussion with Dr. Bowers and family. Discussed medical treatment options.Family Spoke also with Dr. Lang. Awaiting Jasper HARDWICK to discuss goals of care and possible need for palliative vs Hospice consults. Pt quiet today. Up in chair for meals. SO at bedside and very attentive to pts needs. Daughter also here. Awaiting care conference.

## 2018-12-12 NOTE — PLAN OF CARE
Pt denied pain. Vitals stable. A-1 to change, uses urinal. Tele shows SR/SB. NS 10 ml/hr. CT showed aortic dissection. Plan for Care conference tomorrow @ 1500, MD's and family agreeable.Continue to monitor.

## 2018-12-12 NOTE — CONSULTS
Care Transition Initial Assessment -   Reason For Consult: discharge planning  Met with: Patient, significant other Meghna, daughter Eloy, and daughter Elma  Active Problems:    Hypertensive urgency    Dissection of aorta (H)       DATA  Lives With: significant other      Quality of Family Relationships: supportive, involved  Description of Support System: Supportive, Involved  Who is your support system?: Children, Significant Other  Support Assessment: Adequate family and caregiver support.   Identified issues/concerns regarding health management:      Quality of Family Relationships: supportive, involved     Per social work consult for discharge planning.  Patient was admitted on 12-9-18 with hypertensive urgency.  The tentative date of discharge is 12-13-18.  Reviewed chart and had a lengthy conversation with patient and family regarding discharge plans.  Per patient and family report, patient lives with her significant other in a house.  Patient's significant other is his primary caregiver.  Patient and family have made the decision to proceed with comfort care.  Offered a choice of hospice agencies.  Chose [facility].  Referral made to Janeen from [facility].  Shara can meet with patient and family at 11:30 tomorrow.  Update patient's family and they are in agreement.    ASSESSMENT  Cognitive Status:  awake and alert  Concerns to be addressed: discharge planning, hospice.     PLAN  Financial costs for the patient includes N/A.  Patient given options and choices for discharge Hospice agencies.  Patient/family is agreeable to the plan?  Yes:   Patient Goals and Preferences: hospice on discharge.  Patient anticipates discharging to:  Home with [facility]    Will continue to follow and assist with a safe discharge plan.    KAMAR Suresh, [facility]  [phone].

## 2018-12-12 NOTE — PROGRESS NOTES
"Cardiology Progress Note          Assessment and Plan:     1) Aortic Aneurysm/Pseudoaneurysm-  Seen by [facility]- understandably reluctant to take back to surgery.  High mortality risk.  Plan medical management.  Unfortunately unable to make family conference today.  Given opinion to family.  Ultimate decision to Dr. Bowers and patient     2) HTN- still inadequately controlled.  Increase amlodipine     3) CVA- old      4) N/V- resolved.    Plan discharge when BP control obtained.           Interval History:   no new complaints and doing well; no cp, sob, n/v/d, or abd pain.                   Review of Systems:   The 10 point Review of Systems is negative other than noted in the HPI          Medications:   I have reviewed this patient's current medications             Physical Exam:   Blood pressure (!) 142/91, pulse 60, temperature 97.6  F (36.4  C), resp. rate 20, height 1.727 m (5' 8\"), weight 85.3 kg (188 lb 0.8 oz), SpO2 94 %.        Constitutional:    Alert and oriented, well developed, in no acute distress.   Skin:    Warm and dry to touch; no apparent skin lesions.   Head:    Normocephalic, atraumatic.   Eyes:    Pupils equal and round, conjunctivae and lids unremarkable, no xanthalasma.   Neck:    cartoid pulses are full and equalbilaterally, JVP normal, no carotid bruit.   Chest:    No tenderness to palpation.   Lungs:    No increased work of breathing, good air exchange, clear to auscultation bilaterally.   Cardiovascular:    Regular rhythm, S1 normal, S2 normal, No S3 or S4, no murmurs or rubs detected.   Abdomen:    Abdomen soft, bowel sounds normoactive, no hepatosplenomegaly, non-tender.   Peripheral Pulses:    Pulses full and equal in all extremities, no bruits auscultated.   Extremities and Back:    No clubbing, cyanosis.  No edema observed.   Neurological:    hemiplegia   Hematologic/Lymphatic:     Hematologic / Lymphatic: No cervical lymphadenopathy and no supraclavicular lymphadenopathy.         Vital " Sign Ranges  Temperature Temp  Av.1  F (36.7  C)  Min: 97.6  F (36.4  C)  Max: 98.4  F (36.9  C)   Blood pressure Systolic (24hrs), Av , Min:93 , Max:142        Diastolic (24hrs), Av, Min:69, Max:108      Pulse No Data Recorded   Respirations Resp  Av.9  Min: 12  Max: 20   Pulse oximetry SpO2  Av.5 %  Min: 94 %  Max: 95 %         Intake/Output Summary (Last 24 hours) at 2018 1107  Last data filed at 2018 0938  Gross per 24 hour   Intake 240 ml   Output 0 ml   Net 240 ml                  Data:     Lab Results   Component Value Date    WBC 10.0 12/10/2018    HGB 15.0 12/10/2018    HCT 44.2 12/10/2018     12/10/2018     2018    POTASSIUM 4.1 2018    CHLORIDE 108 2018    CO2 27 2018    BUN 23 2018    CR 1.20 2018     (H) 2018    SED 9 2018    DD 1.7 (H) 03/10/2018    NTBNPI 1,656 (H) 2018    NTBNP 294 (H) 2018    TROPONIN <0.07 2005    TROPI <0.015 2018    AST 17 2018    ALT 24 2018    ALKPHOS 124 2018    BILITOTAL 0.7 2018    INR 1.35 (H) 2017

## 2018-12-13 NOTE — DISCHARGE INSTRUCTIONS
Patient will discharge to home with [facility].  [facility] will meet patient and family at home tomorrow at 13:30 to complete the intake process.  [facility]'s phone number is [phone].

## 2018-12-13 NOTE — PROGRESS NOTES
CV Surgery    Patient seen, CT scan findings reviewed. Patient well-known to me. 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 Unfortunately surgical intervention for this would be prohibitively high risk. Recommend medical management. Discussed in length with patient and his family.    Pili Bowers MD

## 2018-12-13 NOTE — PROGRESS NOTES
Discussed all discharge instructions with pt and Sign other(Meghna ) who cares for patient. Verb understanding of all instructions. Awaiting meds from discharge pharmacy.

## 2018-12-13 NOTE — PROGRESS NOTES
Hospice: Met with patients SO Meghna and daughter Eloy to explain the hospice program before meeting with Giovanni. I provided the hospice information sheet and brochure for them to refer to after.   The plan is for the patient to be discharged home today. The patient already has a hospital bed at home which will need to be switched out. I answered all of their questions. We then went to talk with Giovanni who did sign his own hospice consent forms. Giovanni did not have any questions. We completed a POLST which was signed by Jasper HARDWICK. The original was given to Meghna with instructions where to post in the home. I reviewed the hospice comfort meds with Jasper Douglas.  The hospice admission team will complete the admission tomorrow at the [facility] home at 1:30p. They have received copies of their signed hospice consent forms along with the hospice handbook with the 24 hr number.   Thank you for the referral.  Shara Granados RN, BSN   Hospice Admission nurse  [phone]

## 2018-12-13 NOTE — TELEPHONE ENCOUNTER
Reason for Call: Call Back for orders    Order  being requested:  Request for an order or referral:signing into [facility] and would like to make sure tht Duy Li will following and get a verbal ok for standing orders. Will fax Dr. Gordillo for signing the paperowrk    Date needed: as soon as possible    Has the patient been seen by the PCP for this problem? YES        Phone number Patient can be reached at:  Other phone number:  [phone]    Best Time:  any    Can we leave a detailed message on this number?  YES    Call taken on 12/13/2018 at 1:41 PM by Meghna Martinez

## 2018-12-13 NOTE — PLAN OF CARE
Disoriented to place and time at times, forgetful. Tele SR/SB. Total care, incontinent of urine x2. Denies any pain/sob. BP elevated, PRN hydralazine given, and AM dose of coreg given early. Plan for hospice consult today at 1130am. Will continue to monitor.

## 2018-12-13 NOTE — DISCHARGE SUMMARY
Admit Date:     12/10/2018   Discharge Date:           PRIMARY CARE PROVIDER:  Dr. Dipak Gordillo.      DISCHARGE DIAGNOSES:   1.  Thoracic aortic aneurysm/pseudoaneurysm.   2.  Hypertensive urgency.   3.  Nausea and vomiting.   4.  Constipation.   5.  Urinary retention.   6.  History of MSSA sepsis with infective carditis with multiple complications.   7.  Major depressive disorder.   8.  Hyperlipidemia.   9.  Chronic kidney disease, stage II.   10.  GERD.   11.  Aortic valve disease.      DISCHARGE MEDICATIONS:      Review of your medicines      START taking      Dose / Directions   acetaminophen 650 MG suppository  Commonly known as:  TYLENOL  Used for:  Hospice care patient      Dose:  650 mg  Place 1 suppository (650 mg) rectally every 4 hours as needed for fever or mild pain (Do not exceed 4000 mg total acetaminophen per day.) Unwrap prior to insertion.  Quantity:  12 suppository  Refills:  1     amLODIPine 10 MG tablet  Commonly known as:  NORVASC  Used for:  Thoracic aortic aneurysm without rupture (H)      Dose:  10 mg  Start taking on:  12/14/2018  Take 1 tablet (10 mg) by mouth daily  Quantity:  30 tablet  Refills:  0     atropine 1 % ophthalmic solution  Used for:  Hospice care patient      Dose:  2-4 drop  Take 2-4 drops by mouth, place under tongue or place inside cheek every 2 hours as needed for other (terminal respiratory secretions) Not for ophthalmic use.  Quantity:  5 mL  Refills:  1     bisacodyl 10 MG suppository  Commonly known as:  DULCOLAX  Used for:  Hospice care patient      Unwrap and insert 1 suppository rectally twice daily as needed for constipation.  Quantity:  12 suppository  Refills:  1     haloperidol 2 MG/ML (HIGH CONC) solution  Commonly known as:  HALDOL  Used for:  Hospice care patient      Dose:  0.5-1 mg  Take 0.25-0.5 mLs (0.5-1 mg) by mouth, place under tongue or insert rectally every 6 hours as needed for agitation (nausea)  Quantity:  30 mL  Refills:  1     hydrALAZINE 10 MG  tablet  Commonly known as:  APRESOLINE  Used for:  Thoracic aortic aneurysm without rupture (H)      Dose:  10 mg  Take 1 tablet (10 mg) by mouth 2 times daily as needed (Hypertension if systolic blood pressure is over 120 and diastolic is over 90.)  Quantity:  90 tablet  Refills:  0     LORazepam 2 MG/ML (HIGH CONC) solution  Commonly known as:  ATIVAN  Used for:  Hospice care patient      Dose:  0.25-0.5 mg  Take 0.13-0.25 mLs (0.26-0.5 mg) by mouth, place under tongue or insert rectally every 4 hours as needed for anxiety (restlessness)  Quantity:  30 mL  Refills:  1     MEDICATION INSTRUCTION  Used for:  Hospice care patient      If care facility cannot accept or use ranges, facility is instructed to use lower end of dosing range  Refills:  0     metoclopramide 5 MG tablet  Commonly known as:  REGLAN  Used for:  Intractable vomiting with nausea, unspecified vomiting type      Dose:  5 mg  Take 1 tablet (5 mg) by mouth every 6 hours as needed (nausea and vomiting)  Quantity:  30 tablet  Refills:  1     morphine sulfate (high concentrate) 20 MG/ML concentrated solution  Commonly known as:  ROXANOL-CONCENTRATED  Used for:  Hospice care patient      Dose:  2.5-5 mg  Take 0.125-0.25 mLs (2.5-5 mg) by mouth every 2 hours as needed for shortness of breath / dyspnea or breakthrough pain  Quantity:  30 mL  Refills:  0     polyethylene glycol packet  Commonly known as:  MIRALAX/GLYCOLAX  Used for:  Constipation, unspecified constipation type      Dose:  17 g  Take 17 g by mouth 2 times daily  Quantity:  60 packet  Refills:  0     sennosides 8.6 MG tablet  Commonly known as:  SENOKOT  Used for:  Hospice care patient      Dose:  1-2 tablet  Take 1-2 tablets by mouth 2 times daily  Quantity:  100 tablet  Refills:  1        CONTINUE these medicines which have NOT CHANGED      Dose / Directions   albuterol 108 (90 Base) MCG/ACT inhaler  Commonly known as:  PROAIR HFA/PROVENTIL HFA/VENTOLIN HFA  Used for:  Upper respiratory  tract infection, unspecified type      Dose:  2 puff  Inhale 2 puffs into the lungs every 6 hours as needed for shortness of breath / dyspnea or wheezing  Quantity:  1 Inhaler  Refills:  3     aspirin 81 MG chewable tablet  Commonly known as:  ASA  Used for:  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, Cerebrovascular accident (CVA) due to other mechanism      Dose:  81 mg  1 tablet (81 mg) by Oral or Feeding Tube route daily  Quantity:  60 tablet  Refills:  1     atorvastatin 40 MG tablet  Commonly known as:  LIPITOR  Used for:  Cerebrovascular accident (CVA) due to other mechanism      Dose:  40 mg  Take 1 tablet (40 mg) by mouth daily  Quantity:  60 tablet  Refills:  1     Carboxymethylcellulose Sod PF 0.5 % Soln ophthalmic solution  Commonly known as:  REFRESH PLUS  Used for:  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      Dose:  1 drop  Place 1 drop Into the left eye 3 times daily as needed (to flush debris from eye)  Quantity:  1 Bottle  Refills:  1     carvedilol 12.5 MG tablet  Commonly known as:  COREG  Used for:  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, Hypertension goal BP (blood pressure) < 140/90      Dose:  12.5 mg  Take 1 tablet (12.5 mg) by mouth 2 times daily  Quantity:  180 tablet  Refills:  3     famotidine 20 MG tablet  Commonly known as:  PEPCID  Used for:  Medication monitoring encounter      TAKE 1 TABLET BY MOUTH EVERY DAY  Quantity:  90 tablet  Refills:  1     FLUoxetine 20 MG capsule  Commonly known as:  PROzac  Used for:  Major depressive disorder, single episode, moderate (HCC), Anxiety      Dose:  20 mg  Take 1 capsule (20 mg) by mouth daily  Quantity:  90 capsule  Refills:  3     losartan 50 MG tablet  Commonly known as:  COZAAR  Used for:  Hypertension goal BP (blood pressure) < 140/90, Congestive heart failure, NYHA class 2, unspecified congestive heart failure type (H), Cardiomyopathy,  unspecified type (H)      Dose:  50 mg  Take 1 tablet (50 mg) by mouth 2 times daily  Quantity:  180 tablet  Refills:  1     multivitamin w/minerals tablet  Used for:  Cerebrovascular accident (CVA) due to other mechanism      Dose:  1 tablet  Take 1 tablet by mouth daily  Quantity:  30 each  Refills:  0     ondansetron 4 MG tablet  Commonly known as:  ZOFRAN  Used for:  Nausea      Take 1-2 tablets (4-8mg) every 8 hours as needed for nausea  Quantity:  20 tablet  Refills:  0           Where to get your medicines      These medications were sent to [facility] [place], [place] - [address]  [address], [place] [address]    Phone:  [phone]     amLODIPine 10 MG tablet    hydrALAZINE 10 MG tablet    metoclopramide 5 MG tablet    polyethylene glycol packet     Some of these will need a paper prescription and others can be bought over the counter. Ask your nurse if you have questions.    Bring a paper prescription for each of these medications    acetaminophen 650 MG suppository    atropine 1 % ophthalmic solution    bisacodyl 10 MG suppository    haloperidol 2 MG/ML (HIGH CONC) solution    LORazepam 2 MG/ML (HIGH CONC) solution    morphine sulfate (high concentrate) 20 MG/ML concentrated solution    sennosides 8.6 MG tablet  You don't need a prescription for these medications    MEDICATION INSTRUCTION         ALLERGIES:    Allergies   Allergen Reactions     Baclofen Nausea and Vomiting     Loss of bladder control     Lisinopril Swelling     One-sided facial swelling after being on lisinopril/HCTZ for one week.            DISPOSITION:  Home.      FOLLOWUP WITH RECOMMENDATIONS:  The patient will follow up with hospice care team.      ACTIVITY:  As tolerated.      DIET:  Recommend a combination low-salt, low-fat, minimal caffeine diet.      ADDITIONAL DISCHARGE INSTRUCTIONS:  The patient has been encouraged and recommended to consume more water.  Recommend eight 8-ounce glasses of water  a day.      CONSULTATIONS:   1.  Cardiology.   2.  CV Surgery.      LABORATORY, IMAGING AND PROCEDURES.   1.  Routine laboratory studies that included CBC with platelets x2.   2.  Comprehensive basic metabolic panel x2.   3.  Initial troponin, lipase level.  ProBNP.  Blood cultures x2.   4.  Urinalysis.    5.  Two view chest x-ray.   6.  Head CT without contrast.    7.  Two view abdominal x-ray.   8.  CTA of chest, abdomen, and pelvis.    9.  EKG x2.   8.  Complete echocardiogram.      PENDING RESULTS:  Blood cultures are currently in process, but are no growth to date.      PHYSICAL EXAMINATION ON DAY OF DISCHARGE:  Please review a progress note as written earlier today.      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      HOSPITAL COURSE:   1.  Thoracic aortic aneurysm/pseudoaneurysm:  Following workup regarding hypertensive urgency and nausea and vomiting, the patient underwent an echocardiogram that revealed an aortic root that was dilated at 7 cm and difficult visualization of the ascending aorta.  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  The patient was followed by Cardiology and CV Surgery.  CV Surgery evaluated the imaging studies and determined this was likely representing an aneurysm or pseudoaneurysm from previous aortic root repair and redo.  The patient was determined to not be a surgical candidate and had care conference, and  elected to proceed with hospice care at the time of discharge and medical management.  Plans are for the patient to continue with blood pressure management and attempts to maintain systolic blood pressure less than 120.  The patient will begin hospice at discharge and has been started on morphine concentrate solution as needed, Haldol solution, and further hospice medications.  Blood pressure medications have been adjusted and the patient will continue with losartan 50 mg 2 times daily, Coreg 12.5 mg 2 times daily, and has been started on amlodipine 10 mg daily.  He will also be started on as-needed twice daily, hydralazine 10 mg.   2.  Aortic valve disease:  The patient is status post tissue aortic valve replacement that was implanted 12/22/2017, and has noted ascending aortic repair redo with 30 mm graft.  Evaluation by echocardiogram confirms aortic valve replacement without a valve dehiscence.  Again, echo did reveal aortic root that was dilated up to 7 cm.   3.  Hypertensive urgency:  The patient presented with blood pressures as high as 209/151.  He was treated with IV Lasix and IV labetalol.  He was maintained on losartan 50 mg 3 times daily, Coreg 12.5 mg 3 times daily, and started on amlodipine initially at 5 mg and increased at 10 mg daily.  Following ongoing workup and subsequent care conference, the patient will continue on antihypertensive agents and will also have additional as needed 10 mg hydralazine available 2 times daily.  Again, the patient was followed by Cardiology during this stay.   4.  Nausea and vomiting:  This resolved during the stay.  Unclear etiology, but could be secondary to hypertensive urgency as well as thoracic aortic aneurysm and possible constipation.  The patient was started on bowel regimen with good results.  He was managed with as-needed Zofran, Compazine and Reglan, and resolution of nausea and vomiting and return of appetite.  At the time of discharge, the patient will be  sent home with MiraLax 2 times daily, continued with prior to admit Zofran and given a prescription for as needed, Reglan.   5.  Constipation:  It appears the patient has a bowel movement every third day which usually requires Fleet enemas by the patient's significant other and primary caregiver.  When assessed by abdominal x-ray and CTA of chest, abdomen, pelvis, the patient was noted to have a large amount of stool present.  He was started on a bowel regimen with improvement of constipation.  He will continue with bowel regimen and encourage water intake at the time of discharge.   6.  Urinary retention:  Upon admission, the patient seemed to be having little to no urine output.  He required a Espinoza catheter insertion and immediate 1.2 liter output.  This could be secondary to constipation.  The Espinoza was removed the following day, and has had some noted incontinence which is likely secondary to Espinoza placement and should be resolving on its own.   7.  History of MSSA sepsis with infective carditis and multiple Complications:  Again, the patient required aortic valve replacement, was noted to have septic embolization with residual right-sided paresis, left eye blindness, left facial droop and right spastic hemiplegia.  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  He had no neurologic changes during the stay, and no further interventions.   8.  Major depressive disorder:  The patient was maintained on prior to admit fluoxetine which will be continued at discharge.   9.  Hyperlipidemia:  Patient's prior to admit atorvastatin was continued during the stay and will be resumed at the time of discharge.   10.  Chronic kidney disease, stage II.  This remained stable throughout the stay, no interventions were necessary .    11.  Gastroesophageal reflux disease:  The patient was continued on prior to admit Pepcid.  He had complained of some heartburn prior to nausea and vomiting which  was treated with Tums and had full resolution.      CODE STATUS:  DNR/DNI.      The patient was discussed with Dr. Maureen Monsalve who agrees with discharge at this time.  Dr. Monsalve will evaluate the patient independently.      TOTAL DISCHARGE TIME:  Greater than 30 minutes.         MAUREEN MONSALVE MD       As dictated by FOREST MUSA PA-C            D: 2018   T: 2018   MT: GEOVANNY      Name:     ASHTYN STROUD   MRN:      [id number]        Account:        [id number]   :      1955           Admit Date:     12/10/2018                                  Discharge Date:       Document: [id number]       cc: Dipak Gordillo MD

## 2018-12-13 NOTE — PROGRESS NOTES
[facility]    Medicine Progress Note - Hospitalist Service       Date of Admission:  12/9/2018  Assessment & Plan   Cricket Miguel is a 63 year old male admitted on 12/9/2018.     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    Thoracic Aortic Aneurysm/Pseudoaneurysm:  Per Echo the aortic root dilated at 7cm and difficult visualization of the ascending aorta with suspected fluid accumulation.  CTA chest/abd/pelvis revealed new jonathan type A dissection of the ascending thoracic aorta measuring 7.1 x 7.3 cm.   ASSESSMENT:  CV Surgery evaluated imaging studies and determined this likely represents an aneurysm/pseudoaneurysm.    --Patient and family met with Dr. Bowers and Dr. Lang 12/12.  Per their report he is not a surgical candidate.    PLAN:  --Appreciate Cardiology consult:     --Continue blood pressure management and attempt to maintain systolic BP < ~120.  --Appreciate CV Surgery consult:     --Not a surgical candidate per patient and family's report following discussion with Dr. Bowers.    --Plan to meet with  Hospice this morning and would like to discharge today.      Hypertensive Urgency:  Noted BP of 209/151 upon presentation to the ED.  Improved to the 140-150's systolic following administration of IV lasix and IV labetalol.    ASSESSMENT:  BP improved this morning after morning medications.    PLAN:  --Cardiology consult appreciated.      --Plan to continue with Losartan 50 mg BID, Coreg 12.5 mg BID and Amlodipine 10 mg daily.     --Discussed with Dr. Lang about PRN medication and will plan to send out with PO hydralazine.      Nausea and vomiting:  Unclear etiology, could be secondary to hypertensive urgency.   Initial complaints that led to evaluation in the ED.  Has been present for the last couple of days.    ASSESSMENT:  Resolved.    PLAN:  --Symptomatic management with PRN Zofran, Compazine, Reglan.      Constipation:  Per patient's wife he receives Fleet enemas every third day due to constipation.    Noted large amount of stool on abdominal Xray and CT.    ASSESSMENT:  Resolving; now with loose stools.    PLAN:  --Bowel regiment.      Urinary retention:  Espinoza catheter placed on the floor with 1.2 L output.  ASSESSMENT:  Incontinent of urine.    PLAN:  --Bladder scan post void ordered.    --Monitor.      History of MSSA sepsis with infective carditis and with multiple complications:  Required aortic valve and replacement, septic embolization with residual right-sided paresis, left eye blindness and left facial droop.  He follows with [facility] Medicine [facility] Rehab as outpatient.    ASSESSMENT:  Stable.    PLAN:  --Monitor.      Major Depressive Disorder:  ASSESSMENT:  Stable.    PLAN:  --Resume PTA fluoxetine.      HLP:  ASSESSMENT:  Stable.    PLAN:  --Resume PTA atorvastatin.      CKD stage II:  Baseline creatinine 1.1-1.5.    ASSESSMENT:  Stable.    Recent Labs   Lab 12/12/18  0923 12/11/18  0534 12/10/18  0538 12/09/18  1609   CR 1.20 1.35* 1.38* 1.23   PLAN:  --Monitor while adjusting blood pressure medications.    --Avoid nephrotoxic agents.      GERD:  ASSESSMENT:  Improved.    PLAN:  --Resume PTA Pepcid.    --PRN Tums ordered per patient request.    --PRN simethicone available.      GOALS OF CARE:  Met with patient,  His significant other and his 2 daughters.  Discussed meetings/evaluations by Cardiology (Dr. Lang) and CV Surgery (Dr. Bowers).    --Family reported patient is not a surgical candidate.    --Palliative care meeting today.       Diet: Combination Diet Low Saturated Fat Na <2400mg Diet, No Caffeine Diet  DVT Prophylaxis: Pneumatic Compression Devices  Espinoza Catheter: not present  Code Status:  DNR/DNI    Disposition Plan   Expected discharge: Today, recommended to prior living arrangement once meeting with [facility] tomorrow and pending blood control.  Entered: Walter Douglas PA-C 12/13/2018, 8:05 AM       The patient has been discussed with Dr. Monsalve, who agrees with the assessment and plan at this time. Dr. Monsalve will evaluate the patient independently.       Walter Douglas PA-C  Hospitalist Service  [facility]    ______________________________________________________________________    Interval History   Patient in bed with significant other at the bedside.      Patient offered no complaints.  He denied fever, chills, chest pain, shortness of breath or abdominal pain.    Discussed plan for today.  Meeting arranged with [facility] at 11:30am.  Discussed PRN blood pressure control at home and avoidance of constipation.         Data reviewed today: I reviewed all medications, new labs and imaging results over the last 24 hours. I personally reviewed no images or EKG's today.    Physical Exam   Vital Signs: Temp: 97.7  F (36.5  C) Temp src: Axillary BP: (!) 141/92   Heart Rate: 56 Resp: 19 SpO2: 95 % O2 Device: None (Room air)    Weight: 185 lbs 13.56 oz    Constitutional: Awake, alert, cooperative, NAD.    ENT: NCAT, left eye sutured shut, left sided facial droop, oral pharynx with moist mucus membranes, tonsils without erythema or exudates.     Neck: Supple, symmetrical, trachea midline, no adenopathy.  Pulmonary: No increased work of breathing, good air exchange, CTA bilaterally, no crackles or wheezing.  Cardiovascular: Reg Rate Rhytm, normal S1 and S2, no S3 or S4, and no murmur noted.  GI: Bowel sounds present, soft, non-distended, non-tender.    Skin/Integumen: Clear.  Neuro: CN II-XII grossly intact.  Left facial droop present and unable to move right side.    Psych:  Alert and oriented x 3.  Flat affect.    Extremities: No lower extremity edema noted, and  non-TTP bilaterally.       Data   Recent Labs   Lab 12/12/18  0923 12/11/18  0534 12/10/18  0538 12/09/18  1609   WBC  --   --  10.0 10.8   HGB  --   --  15.0 15.9   MCV  --   --  86 87   PLT  --   --  262 279    141 140 140   POTASSIUM 4.1 3.8 4.0 4.1   CHLORIDE 108 107 104 102   CO2 27 32 28 32   BUN 23 25 20 16   CR 1.20 1.35* 1.38* 1.23   ANIONGAP 8 2* 8 6   IZABELLA 8.7 8.6 9.3 9.5   * 112* 106* 133*   ALBUMIN  --   --   --  3.9   PROTTOTAL  --   --   --  8.3   BILITOTAL  --   --   --  0.7   ALKPHOS  --   --   --  124   ALT  --   --   --  24   AST  --   --   --  17   LIPASE  --   --   --  59*   TROPI  --   --   --  <0.015     No results found for this or any previous visit (from the past 24 hour(s)).  Medications     sodium chloride 75 mL/hr at 12/11/18 0932       amLODIPine  10 mg Oral Daily     aspirin  81 mg Oral or Feeding Tube Daily     atorvastatin  40 mg Oral Daily     carvedilol  12.5 mg Oral BID     famotidine  20 mg Oral Daily     FLUoxetine  20 mg Oral Daily     losartan  50 mg Oral BID     polyethylene glycol  17 g Oral BID     prune juice  300 mL Oral BID

## 2018-12-13 NOTE — TELEPHONE ENCOUNTER
[facility] returning call. Patient entering hospice today, calling for verbal for standing orders and to see if physician will follow by phone and fax. Please advise.   Ashleigh Ramos RN   [facility] - Triage

## 2018-12-13 NOTE — TELEPHONE ENCOUNTER
[facility] notified by phone.    Merary Kiran, BS, RN, N  [facility]) [phone]

## 2018-12-13 NOTE — TELEPHONE ENCOUNTER
Per chart review, pt will sign on with  hospice. Will you be ok to follow and ok standing orders?    Called [facility] per message below to verify orders.    Florence Castro RN  [facility]

## 2018-12-13 NOTE — PROGRESS NOTES
[facility]  Cardiology Progress Note    Date of Service (when I saw the patient): 12/13/2018         Assessment & Plan   Cricket Miguel is a 63 year old male who was admitted on 12/9/2018 with nausea/vomiting and hypertensive urgency. 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     1.  : Aortic Aneurysm/Pseudoaneurysm  - CTA demonstrated [place] type A dissection with aneurysmal dilatation of the  ascending thoracic aorta measuring up to 7.1 x 7.3 cm. The dissection  starts at the aortic root and terminates before the takeoff of the  innominate artery.   - CV Surgery consulted, considered very high risk.  - Care conference held with plan to go home with hospice.     2.  : Hypertension  - better controlled in the mid 100's systolic  - Continue on losartan, amlodipine, carvedilol, and PRN hydralazine  - Echo confirms EF 55%  BP now well controlled.  Ok for discharge to home.     3. Aortic Valve disease  - Echo confirms s/p tissue aortic valve replacement (26mm Cryolife valve attached to aortic  conduit, implanted 12-22-17). Mean 3mmHg, Vmax 1.1m/s. No AI or paravalvular  leak. No valve dehiscence.  4. s/p ascending aortic repair redo with 30mm graft. The aortic root is very  dilated, up to 7 cm. Views of this area are very limited, but suspect this is  the residual aorta and/or fluid around the implanted graft. Cannot further  comment on any acute aortic pathology due to limited views.     When directly compared to echo from 3/2018, the residual aortic root appears  larger, but hard to compare due to limited views.     4.  : CVA - old  5.  :  CKD - creatinine stable 1.2    BIBI Mehta CNP  Text Page  (M-F, 7:30 am - 4:00 pm)    Interval History    No cardiac complaints, denies chest pain, shortness of breath, palpitations, PND, orthopnea, or LE edema. Blood pressures controlled today.  Family meeting with hospice today. Plans to discharge today.     Physical Exam   Temp: 97.6  F (36.4  C) Temp src: Axillary BP: 109/83   Heart Rate: 57 Resp: 20 SpO2: 96 % O2 Device: None (Room air)    Vitals:    12/11/18 0538 12/12/18 0627 12/13/18 0640   Weight: 84.7 kg (186 lb 11.7 oz) 85.3 kg (188 lb 0.8 oz) 84.3 kg (185 lb 13.6 oz)     Vital Signs with Ranges  Temp:  [97.6  F (36.4  C)-97.7  F (36.5  C)] 97.6  F (36.4  C)  Heart Rate:  [50-61] 57  Resp:  [12-20] 20  BP: (102-144)/() 109/83  SpO2:  [94 %-98 %] 96 %  I/O last 3 completed shifts:  In: 780 [P.O.:780]  Out: -     GEN:  In general, this is a ill looking male in no acute distress  HEENT:  Pupils equal, round. Sclerae nonicteric. Clear oropharynx. Mucous membranes moist. Left facial droop  NECK: Supple, no masses appreciated. Trachea midline. No JVD   C/V:  Regular rate and rhythm, no murmur, rub or gallop. No S3 or RV heave.   RESP: Respirations are unlabored. No use of accessory muscles. Decreased bases  GI: Abdomen soft, nontender, nondistended. No HSM appreciated.   EXTREM: No  LE edema. No cyanosis or clubbing.  NEURO: Alert and oriented, cooperative. Left side hemiplegia  PSYCH: Normal affect.  SKIN: Warm and dry. No rashes or petechiae appreciated.       Medications     sodium chloride 75 mL/hr at 12/11/18 0932       amLODIPine  10 mg Oral Daily     aspirin  81 mg Oral or Feeding Tube Daily     atorvastatin  40 mg Oral Daily     carvedilol  12.5 mg Oral BID     famotidine  20 mg Oral Daily     FLUoxetine  20 mg Oral Daily     losartan  50 mg Oral BID     polyethylene glycol  17 g Oral BID     prune juice  300 mL Oral BID       Data   Reviewed       Ryann Manley, BIBI CNP 12/13/2018

## 2018-12-14 NOTE — TELEPHONE ENCOUNTER
POLST received - however, Dr. Gordillo would like to talk to patient about page 2 nutrition and antibiotic use    POLST stat scanned - given to KYRA Reagan - to call patient

## 2018-12-14 NOTE — TELEPHONE ENCOUNTER
I called patient and hospice SW is with Meghna and patient now.  They will have hospice RN CM review section E with patient and send updated POLST if needed.    FYI to .    Merary Kiran, RANJEET, RN, [facility]) [phone]

## 2018-12-17 NOTE — TELEPHONE ENCOUNTER
Med Rec Done    Discrepancies:  -metoclopramide (Reglan) 5mg tablet -    -our instructions: 1 tab PO q.6h PRN   -[facility] Instructions: 10mg QID PRN  -Lorazepam (Ativan) 2mg/mL solution   -our instructions: 0.26-0.5mg PO under tongue or rectally q.4h   -[facility]: 0.5-1mg PO/SL/R) q.4h PRN  -morphine sulfate   -our instructions: 0.125-0.25mL q.2h PRN   -[facility]: 0.25-0.5mL q.2h PRN    On our med list and not home care:   -amlodipine 10mg  -aspirin 81mg   -atorvastatin 40mg  -fluoxetine 20mg  -hydralazine 10mg tab  -losartan 50mg  -multivitamin  -ondansetron 4mg tab    Please see form for rest of med rec  Form placed in MD [facility]HA basket on [facility] Desk    Routing to PCP for further review/recommendations/orders.    Joycelyn Jones RN  [facility]

## 2018-12-17 NOTE — TELEPHONE ENCOUNTER
Form signed, faxed to  Hospice @ [phone]  Original sent to scanning    Joycelyn Jones RN  [place] [facility]

## 2018-12-20 NOTE — TELEPHONE ENCOUNTER
Reason for Call:  Form, our goal is to have forms completed with 72 hours, however, some forms may require a visit or additional information.    Type of letter, form or note:  Hospice cert of Terminal illness    Who is the form from?: Home care [place]    Where did the form come from: form was faxed in    What clinic location was the form placed at?: [place]    Where the form was placed: [facility]    What number is listed as a contact on the form?: Fax to [phone]       Additional comments:     Call taken on 12/20/2018 at 3:20 PM by Gwendolyn Maria

## 2018-12-21 NOTE — TELEPHONE ENCOUNTER
Completed forms faxed back to [facility] care at [phone].   Originals sent to be scanned.     Marge Heath

## 2018-12-31 NOTE — TELEPHONE ENCOUNTER
"Requested Prescriptions   Pending Prescriptions Disp Refills     atorvastatin (LIPITOR) 40 MG tablet [Pharmacy Med Name: ATORVASTATIN 40 MG TABLET] 270 tablet 0    Last Written Prescription Date:  1.4.18  Last Fill Quantity: 60,  # refills: 1   Last Office Visit: 12/4/2018   Future Office Visit:    Next 5 appointments (look out 90 days)    Feb 06, 2019  4:00 PM CST  SHORT with Dipak Gordillo MD  [facility] [address] [place] [address] [phone]          Sig: TAKE ONE TABLET BY MOUTH DAILY.    Statins Protocol Passed - 12/30/2018  8:46 AM       Passed - LDL on file in past 12 months    Recent Labs   Lab Test 08/24/18  1035   LDL 47            Passed - No abnormal creatine kinase in past 12 months    Recent Labs   Lab Test 08/24/18  1035   CKT 33               Passed - Recent (12 mo) or future (30 days) visit within the authorizing provider's specialty    Patient had office visit in the last 12 months or has a visit in the next 30 days with authorizing provider or within the authorizing provider's specialty.  See \"Patient Info\" tab in inbasket, or \"Choose Columns\" in Meds & Orders section of the refill encounter.             Passed - Patient is age 18 or older          "

## 2019-01-01 ENCOUNTER — TELEPHONE (OUTPATIENT)
Dept: NURSING | Facility: CLINIC | Age: 64
End: 2019-01-01

## 2019-01-01 ENCOUNTER — NURSE TRIAGE (OUTPATIENT)
Dept: NURSING | Facility: CLINIC | Age: 64
End: 2019-01-01

## 2019-01-01 ENCOUNTER — DOCUMENTATION ONLY (OUTPATIENT)
Facility: CLINIC | Age: 64
End: 2019-01-01

## 2019-01-01 ENCOUNTER — MEDICAL CORRESPONDENCE (OUTPATIENT)
Dept: HEALTH INFORMATION MANAGEMENT | Facility: CLINIC | Age: 64
End: 2019-01-01

## 2019-01-01 RX ORDER — ATORVASTATIN CALCIUM 40 MG/1
TABLET, FILM COATED ORAL
Qty: 90 TABLET | Refills: 0 | Status: SHIPPED | OUTPATIENT
Start: 2019-01-01

## 2019-01-02 NOTE — TELEPHONE ENCOUNTER
Prescription approved per [facility] Refill Protocol.  Florence Castro RN  [facility]

## 2019-01-03 NOTE — PROGRESS NOTES
Hospice physician visit  Location home  Reason for visit//assess symptoms and review prognosis  HPI//patient is a 63-year-old man with complicated past medical history including MSSA sepsis in 2016 with significant complications including a septic embolus and resulting stroke with multiple residual deficits, spastic hemiplegia of the right side, history of cardiomyopathy with normalization of ejection fraction, GERD, COPD, major depression, hypertension and hyperlipidemia, stage III chronic kidney disease.  He was admitted in December with hypertensive urgency, with symptoms of nausea and vomiting echocardiogram at that time revealed an aortic root dilation of 7 cm and difficulty visualizing the ascending aorta further imaging including a CTA chest abdomen and pelvis which revealed a suspected new [place] type a dissection of the ascending thoracic aorta measuring 7.1 x 7.3 cm.  Cardiothoracic surgery determined this was likely an aneurysm versus pseudoaneurysm from previous aortic root repair and redo.  He is not a surgical candidate and was discharged to home with hospice care and medical management given the lack of other options.  The admitting team wanted his blood pressure to be maintained under 120 systolic.  At home he is taking care of by his longtime girlfriend, Meghna.  Recent symptoms have included nausea and vomiting.  He had several episodes prior to Christmas treated with oral Reglan with good effect.  He has had no diarrhea and no known sick contacts.  She is given a sporadic dose of Reglan since that time preventatively and he has had no further vomiting but occasionally has complaints of nausea.  He has chronic constipation with a bowel movement every third day, usually requiring a fleets enema.  This is been true for many years and the regimen is working for them.  He has had more depression and emotional symptoms, crying frequently.  He is on fluoxetine 20 mg daily and has been on this dose for  the past 2 years or so, since his stroke.  Patient has had a runny nose, postnasal drip and wet nonproductive cough over the past week, using Mucinex as needed with some effectiveness, changing his position to his side tends to help.  Meghna notes that he is weaker with transferring, requires heavy assist of 1 and a gait belt.  He has had some episodes of agitation, picking and calling out at night, Haldol is effective.  Medications reviewed in detail with the nurse  PMH//as above.  PSH//history of tracheostomy, rotator cuff repair, repair of his aortic valve and ascending aorta aneurysm in the past.  Umbilical hernia repair, arthroscopy of the shoulder x2 and knee x1.  Social history//previously  and has adult daughters, both live in the area.  Family history positive forBrain cancer in his mother, Alzheimer's disease in his father.  12 point review systems attempted and answered by patient and girlfriend all negative except as per HPI.    Objective  Awake, alert, truncal weakness and unable to stay upright in a chair.  Flat affect.  /80, P 50, R12 O2sats 98% on RA  Eyes//left eyes on partially sewn shut, right eye with reactive pupil, no icterus.  Facial droop on the left.  Oropharynx//moist.  Neck//supple, no LAD, thyromegaly, mass.  Chest//CTA B.  Heart//RRR with 2 out of 6 to 3 out of 6 systolic ejection murmur heard throughout the chest.  Abdomen//soft, NT, ND.  Extremities//no edema, pedal pulses are not palpable.  Neuro//spasticity in the right arm and leg.  Able to  but weakly on the right, stronger  on the left.  Significant truncal weakness.  Psych//tearful at times, difficulty finding words.  Assessment  63-year-old man with large thoracic aneurysm, rapidly progressive associated with hypertensive urgency/emergency, symptomatic at the time of admission.  He is not a surgical candidate, the risk of rupture is quite high and he is having significant signs of decline with decreasing  appetite, declining functional status.  He and his girlfriend ask for discussion about his prognosis and it is poor, estimated less than 6 months.  Most likely he will have a sudden death related to aneurysm rupture and the were educated on use of medication and the comfort kit, when to call hospice.  In addition he has had a cough with no signs of a lower respiratory tract infection, most likely cold.  He has worsening depression, crying at times.  He admits this is due to his feeling that he no longer wants to be here but also wishes he had more time to spend with friends and family.  Plan  Will increase Prozac to 40 mg daily to see if this helps with depression symptoms.  Encouraged him to meet with  and/or  to talk more about his emotional state.  Manage cough with Mucinex, position change.  Can use morphine if needed for persistent cough  Treat agitation with Haldol at bedtime as needed, no need for a scheduled dose currently.  Girlfriend wants to take him on a short trip to [place].  Discussed needing a hospice contract, she may have difficulty getting him in and out of a car so he may require a wheelchair and van transport, [place] lift.    At patient's request I will call his daughter and review prognosis, answer questions.  Joycelyn Almazan MD

## 2019-01-26 NOTE — PROGRESS NOTES
Outpatient Physical Therapy Discharge Note     Patient: Cricket Miguel  : 1955    Beginning/End Dates of Reporting Period:  3/20/2018 - Evaluation only    Following evaluation, pt called on 3/23/18 to cancel all appointments for PT and OT - changing locations for therapies.     Referring Provider: Dipak Gordillo MD    Therapy Diagnosis: impaired functional mobility     Clinical Impression on evaluation: Pt will benefit from skilled PT to maximize strength and safety with mobility, decrease burden of care.     PT Plan: LE strengthening addressing R >L LE weakness, core/trunk strengthening, seated balance/dynamic reaching,    Client Self Report: see eval      Goals:  Goal Identifier HEP   Goal Description Pt will be independent with A from spouse with HEP for strength in LE's to promote increased safety with mobility.    Target Date 18   Date Met      Progress:     Goal Identifier Sitting tolerance (baseline: seated balance: able to sustain seated stability x50 seconds without UE support, B foot support on floor, flexed trunk posture)   Goal Description Pt will be able to perform dynamic seated balance activities x 5 min to prevent falls when participating in dressing and bathing   Target Date 18   Date Met      Progress:     Goal Identifier Transfers (baseline: Total-MaxA x1)   Goal Description  Pt will be able to transfer bed to and from w/c with stand pivot and min A for decreased caregiver burden.   Target Date 18   Date Met      Progress:     Goal Identifier Standing tolerance (baseline: x30 sec with standing frame per report, limited standing tolerance with totalA)   Goal Description Pt will be able to stand with UE support for greater than 1 minute to allow for A with dressing and transfers.   Target Date 18   Date Met      Progress:       Progress Toward Goals:   Pt seen for evaluation only.    Plan:  Discharge from therapy.    Discharge:    Reason for Discharge: Patient chooses  to discontinue therapy.  Changing locations for participation in OP PT and OT.

## 2019-01-26 NOTE — ADDENDUM NOTE
Encounter addended by: Reina Sargent, PT on: 1/26/2019 1:23 PM   Actions taken: Sign clinical note, Episode resolved

## 2019-01-30 NOTE — TELEPHONE ENCOUNTER
Reason for Call: Rivka (RN Case Manager with  Hospice) calls and says that she wants this nurse to let Dr. Dipak Gordillo know that Cricket passed away today, at 4:10 pm. Rivka says that she can be called, at: [phone], if Dr. Gordillo has any questions. RN then left this message in Epic, as requested.  Routed to:  Triage  Pattie Ch RN   [place] [facility]  [phone]

## 2019-01-30 NOTE — TELEPHONE ENCOUNTER
Rivka (RN Case Manager with  Hospice) calls and says that she wants this nurse to let Dr. Dipak Gordillo know that Cricket passed away today, at 4:10 pm. Rivka says that she can be called, at: [phone], if Dr. Gordillo has any questions. RN then left this message in [facility], as requested.    Additional Information    Negative: [1] Caller is not with the adult (patient) AND [2] reporting urgent symptoms    Negative: Lab result questions    Negative: Medication questions    Negative: Caller cannot be reached by phone    Negative: Caller has already spoken to PCP or another triager    Negative: RN needs further essential information from caller in order to complete triage    Negative: Requesting regular office appointment    Negative: [1] Caller requesting NON-URGENT health information AND [2] PCP's office is the best resource    Negative: Health Information question, no triage required and triager able to answer question    Negative: General information question, no triage required and triager able to answer question    Negative: Question about upcoming scheduled test, no triage required and triager able to answer question    Negative: [1] Caller is not with the adult (patient) AND [2] probable NON-URGENT symptoms    [1] Follow-up call to recent contact AND [2] information only call, no triage required    Protocols used: [facility]

## 2019-07-02 NOTE — TELEPHONE ENCOUNTER
----- Message from Parvin Boles sent at 3/13/2018  8:21 AM CDT -----  Regarding: requesting covered dx  Dipak Gordillo MD    Hello,  We received an order for speech therapy for the above patient. For the therapy to be covered by the patient s insurance, the order needs to have a qualifying diagnosis. If there is an appropriate qualifying diagnosis that the patient has, please add this to the speech therapy referral. We will then be able to schedule your patient for their speech therapy needs.   For future reference, the Speech Therapy Referral (procedure code 9048 in Epic) already has a feature built into the order for the qualifying diagnosis codes. For your convenience, you may want to update your preference list to include this referral.  Thank you for your help!  Sincerely, the Rehab Central Scheduling Team     PROVIDER:[TOKEN:[[id number]]] PROVIDER:[TOKEN:[[id number]]],PROVIDER:[TOKEN:[[id number]],FOLLOWUP:[1 week]] PROVIDER:[TOKEN:[[id number]]],PROVIDER:[TOKEN:[[id number]],FOLLOWUP:[1 week]],PROVIDER:[TOKEN:[[id number]]]

## 2020-02-27 NOTE — ANESTHESIA POSTPROCEDURE EVALUATION
Patient: Cricket Miguel    Procedure(s):  REDO Median STERNOTOMY, FEMORAL CANNULATION, Lysis of adhesions, AORTIC ROOT VALVE HOMOGRAFT with 26mm x 7.0cm Cryolife Aortic valve and conduit - Wound Class: I-Clean    Diagnosis:AORTIC ROOT SUDO ANEURYSM  Diagnosis Additional Information: No value filed.    Anesthesia Type:  No value filed.    Note:  Anesthesia Post Evaluation    Patient location during evaluation: PACU  Patient participation: Unable to evaluate secondary to administered sedation  Level of consciousness: obtunded/minimal responses  Pain management: adequate  Airway patency: patent  Cardiovascular status: acceptable  Respiratory status: acceptable  Hydration status: acceptable  PONV: none     Anesthetic complications: None          Last vitals:  Vitals:    12/19/17 1800 12/19/17 1815 12/19/17 1830   BP:      Resp:      Temp:      SpO2: 100% 100% 100%         Electronically Signed By: Rosendo Hewitt MD  December 19, 2017  6:34 PM   yes

## 2022-07-08 NOTE — PLAN OF CARE
Health Maintenance Due   Topic Date Due   â¢ COVID-19 Vaccine (1) Never done   â¢ Annual Physical (ages 2-20)  06/25/2022       Patient is due for topics as listed above but is not proceeding with Immunization(s) COVID-19 at this time. Problem: Patient Care Overview  Goal: Plan of Care/Patient Progress Review  Afebrile. Intermittent HTN - responded to admin of prn hydralazine x2. HR, RR wdl. o2 sats maintained > 92% on 2lpm nc. Telemetry maintained: NSR. PERRLA. FRANCESCO orientation, LOC. Pt not following commands. Weak motor response to grasp. Facial asymmetry present. Follows caregivers with eyes. Minimal verbal responses given to authoring RN during cares. Yes, no responses logical. No periods of restlessness, agitation noted. Tylenol and oxycodone admin x1 for pain control. LS coarse. Production cough. No suctioning needed. PIV sl'd. PICC lumen with + blood return on all lumen. One lumen infusing MIVF. IV abx admin as ordered. CT's patent to -20 h20 suction. Dressings intact with dried drainage. Midline chest incision CDI. Small mid chest horizontal incision from pacer wires mervin with small amt serosang drainage. Dry gauze dressing applied. Tolerated TF/Flushes at goal. Repositioned q2hrs. Condom cath in place. No stool overnight. Rested btwn cares. Continue poc.

## 2023-04-26 NOTE — PLAN OF CARE
Catheter removed intact. Problem: Restraint for Non-Violent/Non-Self-Destructive Behavior  Goal: Prevent/Manage Potential Problems  Maintain safety of patient and others during period of restraint.  Promote psychological and physical wellbeing.  Prevent injury to skin and involved body parts.  Promote nutrition, hydration, and elimination.   Outcome: No Change  Restraints remains in place on LUE d/t pulling at ETT.

## 2023-11-10 NOTE — PLAN OF CARE
Problem: Patient Care Overview  Goal: Plan of Care/Patient Progress Review    Discharge Planner SLP   Patient plan for discharge: rehab vs HH  Current status: Pt seen for dysphagia tx. With encouragement from S.O. and SLP, pt accepting trials of regular solids and thin liquids. No overt s/s of aspiration presented and S.O. demonstrating appropriate cues and supervision for safe swallow strategies, carryover from prior episodes of care after his stroke. Recommending advance to regular diet and thin liquids. Pt should be alert, upright, have 1:1 assist. Safe swallow strategies (written in room): chew on R (strong side), place straw in R corner of his mouth, encourage slow and small sips/bites.   Barriers to return to prior living situation: none from St standpoint  Recommendations for discharge:  HH vs. TCU with ongoing SLP for aphasia/dysarthria and possibly dysphagia tx (goals likely met prior to d/c).   Rationale for recommendations: see above.       Entered by: Daniela Lehman 01/03/2018 3:36 PM            Cheiloplasty (Less Than 50%) Text: A decision was made to reconstruct the defect with a  cheiloplasty.  The defect was undermined extensively.  Additional orbicularis oris muscle was excised with a 15 blade scalpel.  The defect was converted into a full thickness wedge, of less than 50% of the vertical height of the lip, to facilite a better cosmetic result.  Small vessels were then tied off with 5-0 monocyrl. The orbicularis oris, superficial fascia, adipose and dermis were then reapproximated.  After the deeper layers were approximated the epidermis was reapproximated with particular care given to realign the vermilion border.

## 2024-04-25 NOTE — TELEPHONE ENCOUNTER
Reason for Call:  Same Day Appointment, Requested Provider:  Dipak Gordillo MD    PCP: Dipak Gordillo    Reason for visit: The patient's wife is calling saying Cricket needs to get in to see Dr. Gordillo soon. She says he runs out of energy pretty quickly. She says she knows it might be part of the stroke he had but wants him to get checked out.    Can we leave a detailed message on this number? YES    Phone number patient can be reached at: Home number on file [phone] (home)    Best Time: Anytime    Call taken on 8/14/2018 at 1:50 PM by Shabana Denson     verbal cues/nonverbal cues (demo/gestures)/1 person assist

## 2025-03-25 NOTE — PROGRESS NOTES
Service Date: 2018      HISTORY OF PRESENT ILLNESS:  I had the pleasure of seeing Mr. Miguel, a pleasant 63-year-old patient who follows with Dr. 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  He had dental work done in the meantime, which may have been the source of his infection.  Recent blood cultures from March were negative.  His cardiomyopathy with decreased LV function has been improving.  Dr. Garzon reviewed the echocardiogram when he was seen 2018 showing an improving LVEF of closer to 50%.      In May, he had an emergency room visit.  He was diagnosed with C. difficile and discharged on antibiotics, which he has completed.  He is now going through more dental implants.      Today he feels great.  He states he has no complaints.  He has no complaints of chest pain, chest pressure, neck or arm pain.  No complaints of shortness of breath.      IMPRESSION AND PLAN:   1.  History of infective endocarditis, status post aortic valve and ascending aortic replacement, subsequent stroke and dehiscence requiring replacement.      From a cardiac standpoint, patient is doing well.  His cardiovascular medications are appropriate.  Dr. Gordillo decreased his carvedilol back in April due to lethargy.  I have left the carvedilol at 6.25 mg twice a day.      2.  Coronary artery disease, status post CABG at the time of his initial valve replacement.  Continue medical management.      3.  Stroke with left-sided facial droop and right-sided hemiparesis.      Continue rehabilitation.      4.  Cardiomyopathy, improving.  Follow up with Dr. Garzon in 6 months with an echocardiogram done prior to the visit.         BIBI MARCIAL, CNP             D: 2018   T: 2018   MT: [id number]      Name:     ASHTYN MIGUEL   MRN:      [id number]        Account:      [id number]   :      1955            Service Date: 07/09/2018      Document: [id number]       Opt out

## (undated) DEVICE — TUBING INSUFFLATION W/FILTER CPC TO LUER 620-030-301

## (undated) DEVICE — BLADE KNIFE SURG 11 371111

## (undated) DEVICE — DRSG DRAIN 4X4" 7086

## (undated) DEVICE — BLADE SAW SAGITTAL STERNOTOMY CV SM LINVATEC 5023-187

## (undated) DEVICE — SOL NACL 0.9% IRRIG 3000ML BAG 2B7477

## (undated) DEVICE — SU PROLENE 6-0 C-1DA 30" 8706H

## (undated) DEVICE — SOL NACL 0.9% 10ML VIAL 0409-4888-02

## (undated) DEVICE — SU ETHIBOND 2-0 V-5 DA 10X30" 10X52

## (undated) DEVICE — SPONGE RAY-TEC 4X8" 7318

## (undated) DEVICE — GLOVE ESTEEM BLUE W/NEU-THERA 7.5  2D73PB75

## (undated) DEVICE — ESU GROUND PAD ADULT REM W/15' CORD E7507DB

## (undated) DEVICE — GLOVE PROTEXIS MICRO 7.5  2D73PM75

## (undated) DEVICE — CAST PADDING 6" STERILE 9046S

## (undated) DEVICE — ESU HOLSTER PLASTIC DISP E2400

## (undated) DEVICE — ESU PENCIL W/ROCKER SWITCH BLADE HOLSTER E2350HDB

## (undated) DEVICE — SU VICRYL 3-0 FS-1 27" J442H

## (undated) DEVICE — WIPES FOLEY CARE SURESTEP PROVON DFC100

## (undated) DEVICE — DEFIB PRO-PADZ LVP LQD GEL ADULT 8900-2105-01

## (undated) DEVICE — NDL ANGIOCATH 14GA 1.25" 4048

## (undated) DEVICE — SU ETHIBOND 0 TIE 6X30" X306H

## (undated) DEVICE — PROTECTOR ARM ONE-STEP TRENDELENBURG 40418

## (undated) DEVICE — TOURNIQUET CUFF 34" STERILE

## (undated) DEVICE — NDL COUNTER 20CT 31142493

## (undated) DEVICE — SUCTION MANIFOLD DORNOCH ULTRA CART UL-CL500

## (undated) DEVICE — SU PROLENE 4-0 SHDA 36" 8521H

## (undated) DEVICE — CONNECTOR DRAIN CHEST Y EXTENSION SET 19909

## (undated) DEVICE — INSERT FOGARTY 61MM TRACTION HYDRAJAW HYDRA61

## (undated) DEVICE — SU ETHIBOND 3-0 BBDA 36" X588H

## (undated) DEVICE — Device

## (undated) DEVICE — SU ETHIBOND 2-0 SHDA 30" X563H

## (undated) DEVICE — TUBE CULTURE ANAEROBIC PORT-A-CUL 11ML

## (undated) DEVICE — SU PLEDGET SOFT TFE 13MMX7MMX1.5MM D7044

## (undated) DEVICE — DRSG TELFA 3X8" 1238

## (undated) DEVICE — DRAPE IOBAN INCISE 23X17" 6650EZ

## (undated) DEVICE — SU DERMABOND ADVANCED .7ML DNX12

## (undated) DEVICE — PREP CHLORAPREP 26ML TINTED ORANGE  260815

## (undated) DEVICE — ESU GROUND PAD ADULT W/CORD E7507

## (undated) DEVICE — SOL WATER IRRIG 1000ML BOTTLE 07139-09

## (undated) DEVICE — SU STEEL 6 CCS 4X18" M654G

## (undated) DEVICE — GLOVE PROTEXIS W/NEU-THERA 7.5  2D73TE75

## (undated) DEVICE — SU ETHIBOND 2-0 V-7DA 10X30" 10X72

## (undated) DEVICE — LINEN TOWEL PACK X6 WHITE 5487

## (undated) DEVICE — DRAIN HEMOVAC RESERVOIR KIT 10FR 1/8" MED 00-2550-002-10

## (undated) DEVICE — PUNCH AORTIC 4.0MM LONG AP-540

## (undated) DEVICE — TUBE CULTURE W/SCREW CAP STERILE INSIDE 222-2089-05I

## (undated) DEVICE — STRAP STIRRUP W/SLIP 30187-030

## (undated) DEVICE — PREP SKIN SCRUB TRAY 4461A

## (undated) DEVICE — TUBING ARTHRO CONMED/LINVATEC PUMP BLUE INFLOW 10K100

## (undated) DEVICE — GLOVE PROTEXIS POWDER FREE 7.5 ORTHOPEDIC 2D73ET75

## (undated) DEVICE — DRAPE SHEET REV FOLD 3/4 9349

## (undated) DEVICE — PREP CHLORHEXIDINE 4% 4OZ (HIBICLENS) 57504

## (undated) DEVICE — LEAD PACER MYOCARDIAL BIPOLAR TEMPORARY 53CM 6495F

## (undated) DEVICE — DRAIN CHEST TUBE 32FR STR 8032

## (undated) DEVICE — SOL NACL 0.9% IRRIG 1000ML BOTTLE 2F7124

## (undated) DEVICE — SU PROLENE 4-0 RB-1DA 36" 8557H

## (undated) DEVICE — BLADE CLIPPER SGL USE 9680

## (undated) DEVICE — LINEN GOWN XLG 5407

## (undated) DEVICE — SU VICRYL 2-0 CT-1 27" UND J259H

## (undated) DEVICE — DRAPE SLUSH/WARMER 66X44" ORS-320

## (undated) DEVICE — NDL 21GA 1.5"

## (undated) DEVICE — ESU ELEC BLADE 6" COATED E1450-6

## (undated) DEVICE — SUCTION CATH AIRLIFE TRI-FLO W/CONTROL PORT 14FR  T60C

## (undated) DEVICE — SU PROLENE 5-0 RB-2DA 30" 8710H

## (undated) DEVICE — DRSG TEGADERM 8X12" 1629

## (undated) DEVICE — BASIN SET SINGLE STERILE 13752-624

## (undated) DEVICE — SUCTION DRY CHEST DRAIN OASIS 3600-100

## (undated) DEVICE — SU STEEL MYO/WIRE II STERNOTOMY 8 BE-1 3X14" 048-217

## (undated) DEVICE — SU ETHIBOND 0 CT-1 CR 8X18" CX21D

## (undated) DEVICE — DECANTER BAG 2002S

## (undated) DEVICE — PACK ADULT HEART UMMC PV15CG92D

## (undated) DEVICE — SUCTION TIP YANKAUER STR K87

## (undated) DEVICE — SU PROLENE 3-0 SHDA 36" 8522H

## (undated) DEVICE — ADH BIOGLUE CARTRIDGE 10ML BG3510-5-US

## (undated) DEVICE — TIES BANDING T50R

## (undated) DEVICE — BLADE SHAVER ARTHRO 4.2MM CUDA C9254

## (undated) DEVICE — LINEN TOWEL PACK X5 5464

## (undated) DEVICE — SU ETHILON 2-0 FS 18" 664H

## (undated) DEVICE — SU VICRYL 0 CTX 36" J370H

## (undated) DEVICE — LINEN TOWEL PACK X30 5481

## (undated) DEVICE — PREP DURAPREP 26ML APL 8630

## (undated) DEVICE — SU PLEDGET SOFT TFE 3/8"X3/26"X1/16" PCP40

## (undated) DEVICE — SOL NACL 0.9% INJ 1000ML BAG 07983-09

## (undated) DEVICE — DRAPE ARTHROSCOPY 120X92" 9414

## (undated) DEVICE — PAD CHUX UNDERPAD 23X24" 7136

## (undated) RX ORDER — LIDOCAINE HYDROCHLORIDE 20 MG/ML
INJECTION, SOLUTION EPIDURAL; INFILTRATION; INTRACAUDAL; PERINEURAL
Status: DISPENSED
Start: 2017-12-19

## (undated) RX ORDER — ROCURONIUM BROMIDE 50 MG/5 ML
SYRINGE (ML) INTRAVENOUS
Status: DISPENSED
Start: 2017-10-08

## (undated) RX ORDER — FENTANYL CITRATE 50 UG/ML
INJECTION, SOLUTION INTRAMUSCULAR; INTRAVENOUS
Status: DISPENSED
Start: 2017-12-19

## (undated) RX ORDER — ETOMIDATE 2 MG/ML
INJECTION INTRAVENOUS
Status: DISPENSED
Start: 2017-12-19

## (undated) RX ORDER — ALBUMIN, HUMAN INJ 5% 5 %
SOLUTION INTRAVENOUS
Status: DISPENSED
Start: 2017-12-19

## (undated) RX ORDER — PROPOFOL 10 MG/ML
INJECTION, EMULSION INTRAVENOUS
Status: DISPENSED
Start: 2017-10-08

## (undated) RX ORDER — FUROSEMIDE 10 MG/ML
INJECTION INTRAMUSCULAR; INTRAVENOUS
Status: DISPENSED
Start: 2017-12-19

## (undated) RX ORDER — HEPARIN SODIUM 1000 [USP'U]/ML
INJECTION, SOLUTION INTRAVENOUS; SUBCUTANEOUS
Status: DISPENSED
Start: 2017-10-11

## (undated) RX ORDER — MANNITOL 20 G/100ML
INJECTION, SOLUTION INTRAVENOUS
Status: DISPENSED
Start: 2017-12-19

## (undated) RX ORDER — FENTANYL CITRATE 50 UG/ML
INJECTION, SOLUTION INTRAMUSCULAR; INTRAVENOUS
Status: DISPENSED
Start: 2017-10-11

## (undated) RX ORDER — DEXAMETHASONE SODIUM PHOSPHATE 4 MG/ML
INJECTION, SOLUTION INTRA-ARTICULAR; INTRALESIONAL; INTRAMUSCULAR; INTRAVENOUS; SOFT TISSUE
Status: DISPENSED
Start: 2017-12-19

## (undated) RX ORDER — PHENYLEPHRINE HCL IN 0.9% NACL 1 MG/10 ML
SYRINGE (ML) INTRAVENOUS
Status: DISPENSED
Start: 2017-12-19

## (undated) RX ORDER — PROPOFOL 10 MG/ML
INJECTION, EMULSION INTRAVENOUS
Status: DISPENSED
Start: 2017-12-19

## (undated) RX ORDER — LIDOCAINE HYDROCHLORIDE 10 MG/ML
INJECTION, SOLUTION EPIDURAL; INFILTRATION; INTRACAUDAL; PERINEURAL
Status: DISPENSED
Start: 2017-10-11

## (undated) RX ORDER — PROTAMINE SULFATE 10 MG/ML
INJECTION, SOLUTION INTRAVENOUS
Status: DISPENSED
Start: 2017-12-19

## (undated) RX ORDER — FENTANYL CITRATE 50 UG/ML
INJECTION, SOLUTION INTRAMUSCULAR; INTRAVENOUS
Status: DISPENSED
Start: 2017-10-23

## (undated) RX ORDER — HEPARIN SODIUM 1000 [USP'U]/ML
INJECTION, SOLUTION INTRAVENOUS; SUBCUTANEOUS
Status: DISPENSED
Start: 2017-12-19